# Patient Record
Sex: FEMALE | Race: WHITE | Employment: OTHER | URBAN - NONMETROPOLITAN AREA
[De-identification: names, ages, dates, MRNs, and addresses within clinical notes are randomized per-mention and may not be internally consistent; named-entity substitution may affect disease eponyms.]

---

## 2017-01-25 ENCOUNTER — TELEPHONE (OUTPATIENT)
Dept: PAIN MANAGEMENT | Age: 77
End: 2017-01-25

## 2017-02-06 RX ORDER — OXYCODONE HYDROCHLORIDE 10 MG/1
10 TABLET ORAL 4 TIMES DAILY PRN
Qty: 120 TABLET | Refills: 0 | Status: SHIPPED | OUTPATIENT
Start: 2017-02-10 | End: 2017-03-06 | Stop reason: SDUPTHER

## 2017-02-07 ENCOUNTER — TELEPHONE (OUTPATIENT)
Dept: PAIN MANAGEMENT | Age: 77
End: 2017-02-07

## 2017-03-07 RX ORDER — OXYCODONE HYDROCHLORIDE 10 MG/1
10 TABLET ORAL 4 TIMES DAILY PRN
Qty: 120 TABLET | Refills: 0 | Status: SHIPPED | OUTPATIENT
Start: 2017-03-12 | End: 2017-03-20 | Stop reason: SDUPTHER

## 2017-03-20 ENCOUNTER — HOSPITAL ENCOUNTER (OUTPATIENT)
Dept: PAIN MANAGEMENT | Age: 77
Discharge: HOME | End: 2017-03-20
Payer: MEDICARE

## 2017-03-20 VITALS
HEIGHT: 62 IN | TEMPERATURE: 98.8 F | WEIGHT: 220 LBS | OXYGEN SATURATION: 93 % | SYSTOLIC BLOOD PRESSURE: 120 MMHG | BODY MASS INDEX: 40.48 KG/M2 | RESPIRATION RATE: 20 BRPM | HEART RATE: 82 BPM | DIASTOLIC BLOOD PRESSURE: 60 MMHG

## 2017-03-20 DIAGNOSIS — M70.61 TROCHANTERIC BURSITIS OF BOTH HIPS: ICD-10-CM

## 2017-03-20 DIAGNOSIS — M70.62 TROCHANTERIC BURSITIS OF BOTH HIPS: ICD-10-CM

## 2017-03-20 DIAGNOSIS — M48.061 SPINAL STENOSIS AT L4-L5 LEVEL: Chronic | ICD-10-CM

## 2017-03-20 DIAGNOSIS — M47.816 LUMBAR FACET ARTHROPATHY: ICD-10-CM

## 2017-03-20 DIAGNOSIS — M51.36 LUMBAR DEGENERATIVE DISC DISEASE: ICD-10-CM

## 2017-03-20 PROCEDURE — 62323 NJX INTERLAMINAR LMBR/SAC: CPT

## 2017-03-20 PROCEDURE — 99152 MOD SED SAME PHYS/QHP 5/>YRS: CPT

## 2017-03-20 PROCEDURE — 6360000002 HC RX W HCPCS

## 2017-03-20 PROCEDURE — 2580000003 HC RX 258

## 2017-03-20 PROCEDURE — 2500000003 HC RX 250 WO HCPCS

## 2017-03-20 PROCEDURE — 3209999900 FLUORO FOR SURGICAL PROCEDURES

## 2017-03-20 RX ORDER — SUMATRIPTAN 50 MG/1
50 TABLET, FILM COATED ORAL
Qty: 60 TABLET | Refills: 2 | Status: SHIPPED | OUTPATIENT
Start: 2017-03-20 | End: 2017-06-20 | Stop reason: SDUPTHER

## 2017-03-20 RX ORDER — DOXEPIN HYDROCHLORIDE 10 MG/1
10 CAPSULE ORAL 2 TIMES DAILY
Qty: 60 CAPSULE | Refills: 0 | Status: SHIPPED | OUTPATIENT
Start: 2017-03-20 | End: 2017-03-20 | Stop reason: SDUPTHER

## 2017-03-20 RX ORDER — LIDOCAINE HYDROCHLORIDE 10 MG/ML
INJECTION, SOLUTION EPIDURAL; INFILTRATION; INTRACAUDAL; PERINEURAL
Status: COMPLETED | OUTPATIENT
Start: 2017-03-20 | End: 2017-03-20

## 2017-03-20 RX ORDER — DOXEPIN HYDROCHLORIDE 10 MG/1
10 CAPSULE ORAL 2 TIMES DAILY
Qty: 60 CAPSULE | Refills: 2 | Status: SHIPPED | OUTPATIENT
Start: 2017-03-20 | End: 2017-06-13

## 2017-03-20 RX ORDER — 0.9 % SODIUM CHLORIDE 0.9 %
VIAL (ML) INJECTION
Status: COMPLETED | OUTPATIENT
Start: 2017-03-20 | End: 2017-03-20

## 2017-03-20 RX ORDER — BUTALBITAL, ASPIRIN, AND CAFFEINE 325; 50; 40 MG/1; MG/1; MG/1
1 CAPSULE ORAL EVERY 4 HOURS PRN
Qty: 40 CAPSULE | Refills: 2 | Status: SHIPPED | OUTPATIENT
Start: 2017-03-20 | End: 2017-06-20 | Stop reason: SDUPTHER

## 2017-03-20 RX ORDER — BUPIVACAINE HYDROCHLORIDE 2.5 MG/ML
INJECTION, SOLUTION EPIDURAL; INFILTRATION; INTRACAUDAL
Status: COMPLETED | OUTPATIENT
Start: 2017-03-20 | End: 2017-03-20

## 2017-03-20 RX ORDER — OXYCODONE HYDROCHLORIDE 10 MG/1
10 TABLET ORAL 4 TIMES DAILY PRN
Qty: 120 TABLET | Refills: 0 | Status: SHIPPED | OUTPATIENT
Start: 2017-04-11 | End: 2017-05-08 | Stop reason: SDUPTHER

## 2017-03-20 RX ORDER — METHYLPREDNISOLONE ACETATE 80 MG/ML
INJECTION, SUSPENSION INTRA-ARTICULAR; INTRALESIONAL; INTRAMUSCULAR; SOFT TISSUE
Status: COMPLETED | OUTPATIENT
Start: 2017-03-20 | End: 2017-03-20

## 2017-03-20 RX ORDER — MIDAZOLAM HYDROCHLORIDE 1 MG/ML
INJECTION INTRAMUSCULAR; INTRAVENOUS
Status: COMPLETED | OUTPATIENT
Start: 2017-03-20 | End: 2017-03-20

## 2017-03-20 RX ADMIN — BUPIVACAINE HYDROCHLORIDE 2.5 ML: 2.5 INJECTION, SOLUTION EPIDURAL; INFILTRATION; INTRACAUDAL at 12:17

## 2017-03-20 RX ADMIN — Medication 1.5 ML: at 12:17

## 2017-03-20 RX ADMIN — METHYLPREDNISOLONE ACETATE 80 MG: 80 INJECTION, SUSPENSION INTRA-ARTICULAR; INTRALESIONAL; INTRAMUSCULAR; SOFT TISSUE at 12:17

## 2017-03-20 RX ADMIN — LIDOCAINE HYDROCHLORIDE 3 ML: 10 INJECTION, SOLUTION EPIDURAL; INFILTRATION; INTRACAUDAL; PERINEURAL at 12:15

## 2017-03-20 RX ADMIN — MIDAZOLAM HYDROCHLORIDE 2 MG: 1 INJECTION INTRAMUSCULAR; INTRAVENOUS at 12:15

## 2017-03-20 ASSESSMENT — PAIN DESCRIPTION - ORIENTATION: ORIENTATION: RIGHT;LEFT;LOWER

## 2017-03-20 ASSESSMENT — PAIN DESCRIPTION - ONSET: ONSET: ON-GOING

## 2017-03-20 ASSESSMENT — PAIN DESCRIPTION - LOCATION: LOCATION: BACK;LEG

## 2017-03-20 ASSESSMENT — PAIN DESCRIPTION - FREQUENCY: FREQUENCY: CONTINUOUS

## 2017-03-20 ASSESSMENT — PAIN DESCRIPTION - PROGRESSION: CLINICAL_PROGRESSION: NOT CHANGED

## 2017-03-20 ASSESSMENT — PAIN DESCRIPTION - PAIN TYPE: TYPE: CHRONIC PAIN

## 2017-03-20 ASSESSMENT — PAIN SCALES - GENERAL: PAINLEVEL_OUTOF10: 9

## 2017-03-30 ENCOUNTER — TRANSCRIBE ORDERS (OUTPATIENT)
Dept: ADMINISTRATIVE | Facility: HOSPITAL | Age: 77
End: 2017-03-30

## 2017-03-30 DIAGNOSIS — Z12.31 ENCOUNTER FOR SCREENING MAMMOGRAM FOR MALIGNANT NEOPLASM OF BREAST: Primary | ICD-10-CM

## 2017-03-31 ENCOUNTER — TRANSCRIBE ORDERS (OUTPATIENT)
Dept: ADMINISTRATIVE | Facility: HOSPITAL | Age: 77
End: 2017-03-31

## 2017-03-31 DIAGNOSIS — I65.29 CAROTID ARTERY PLAQUE, UNSPECIFIED LATERALITY: Primary | ICD-10-CM

## 2017-04-04 ENCOUNTER — TRANSCRIBE ORDERS (OUTPATIENT)
Dept: ADMINISTRATIVE | Facility: HOSPITAL | Age: 77
End: 2017-04-04

## 2017-04-04 DIAGNOSIS — R23.2 FLUSH: Primary | ICD-10-CM

## 2017-04-17 ENCOUNTER — HOSPITAL ENCOUNTER (OUTPATIENT)
Dept: MAMMOGRAPHY | Facility: HOSPITAL | Age: 77
Discharge: HOME | End: 2017-04-17
Attending: INTERNAL MEDICINE | Admitting: INTERNAL MEDICINE

## 2017-04-17 ENCOUNTER — HOSPITAL ENCOUNTER (OUTPATIENT)
Dept: ULTRASOUND IMAGING | Facility: HOSPITAL | Age: 77
Discharge: HOME | End: 2017-04-17
Attending: INTERNAL MEDICINE

## 2017-04-17 DIAGNOSIS — Z12.31 ENCOUNTER FOR SCREENING MAMMOGRAM FOR MALIGNANT NEOPLASM OF BREAST: ICD-10-CM

## 2017-04-17 DIAGNOSIS — I65.29 CAROTID ARTERY PLAQUE, UNSPECIFIED LATERALITY: ICD-10-CM

## 2017-04-17 PROCEDURE — G0202 SCR MAMMO BI INCL CAD: HCPCS

## 2017-04-17 PROCEDURE — 93880 EXTRACRANIAL BILAT STUDY: CPT

## 2017-04-17 PROCEDURE — 77063 BREAST TOMOSYNTHESIS BI: CPT

## 2017-04-17 PROCEDURE — 93880 EXTRACRANIAL BILAT STUDY: CPT | Performed by: SURGERY

## 2017-04-18 ENCOUNTER — TRANSCRIBE ORDERS (OUTPATIENT)
Dept: ADMINISTRATIVE | Facility: HOSPITAL | Age: 77
End: 2017-04-18

## 2017-04-18 DIAGNOSIS — R92.1 BREAST CALCIFICATION, RIGHT: Primary | ICD-10-CM

## 2017-04-24 ENCOUNTER — TRANSCRIBE ORDERS (OUTPATIENT)
Dept: ADMINISTRATIVE | Facility: HOSPITAL | Age: 77
End: 2017-04-24

## 2017-04-24 ENCOUNTER — DOCUMENTATION (OUTPATIENT)
Dept: ULTRASOUND IMAGING | Facility: HOSPITAL | Age: 77
End: 2017-04-24

## 2017-04-24 ENCOUNTER — HOSPITAL ENCOUNTER (OUTPATIENT)
Dept: MAMMOGRAPHY | Facility: HOSPITAL | Age: 77
Discharge: HOME | End: 2017-04-24
Attending: INTERNAL MEDICINE | Admitting: INTERNAL MEDICINE

## 2017-04-24 ENCOUNTER — DOCUMENTATION (OUTPATIENT)
Dept: MAMMOGRAPHY | Facility: HOSPITAL | Age: 77
End: 2017-04-24

## 2017-04-24 DIAGNOSIS — R92.1 BREAST CALCIFICATIONS: Primary | ICD-10-CM

## 2017-04-24 DIAGNOSIS — R92.1 BREAST CALCIFICATION, RIGHT: ICD-10-CM

## 2017-04-24 PROCEDURE — G0206 DX MAMMO INCL CAD UNI: HCPCS

## 2017-04-24 NOTE — PROGRESS NOTES
Spoke with Patel with [facility].  No preauth needed for stereotactic biopsy.  Reference # is [id number].  Varinder with Dr. Zaire Uriarte notified of date, time and preauth information.

## 2017-04-24 NOTE — PROGRESS NOTES
Call received to see patient with right breast calcifications and recommendation per Dr. Pak for a right breast stereotactic biopsy.  Call placed to Dr. Uriarte.  Spoke with Jessica.  Approved to set up biopsy.      Spoke with patient.  Educational material given.  All questions answered for pre, during and post procedure.

## 2017-05-02 ENCOUNTER — TRANSCRIBE ORDERS (OUTPATIENT)
Dept: ADMINISTRATIVE | Facility: HOSPITAL | Age: 77
End: 2017-05-02

## 2017-05-02 ENCOUNTER — LAB (OUTPATIENT)
Dept: LAB | Facility: HOSPITAL | Age: 77
End: 2017-05-02

## 2017-05-02 ENCOUNTER — HOSPITAL ENCOUNTER (OUTPATIENT)
Dept: MAMMOGRAPHY | Facility: HOSPITAL | Age: 77
Discharge: HOME | End: 2017-05-02
Attending: INTERNAL MEDICINE | Admitting: INTERNAL MEDICINE

## 2017-05-02 ENCOUNTER — HOSPITAL ENCOUNTER (OUTPATIENT)
Dept: MAMMOGRAPHY | Facility: HOSPITAL | Age: 77
Discharge: HOME | End: 2017-05-02
Attending: INTERNAL MEDICINE

## 2017-05-02 DIAGNOSIS — R92.8 MAMMOGRAM ABNORMAL: Primary | ICD-10-CM

## 2017-05-02 DIAGNOSIS — R92.1 BREAST CALCIFICATIONS: ICD-10-CM

## 2017-05-02 LAB
INR PPP: 1 (ref 0.91–1.09)
PROTHROMBIN TIME: 12.3 SECONDS (ref 10–13.8)

## 2017-05-02 PROCEDURE — 85610 PROTHROMBIN TIME: CPT | Performed by: INTERNAL MEDICINE

## 2017-05-02 PROCEDURE — 88305 TISSUE EXAM BY PATHOLOGIST: CPT | Performed by: INTERNAL MEDICINE

## 2017-05-02 RX ORDER — LIDOCAINE HYDROCHLORIDE 10 MG/ML
10 INJECTION, SOLUTION INFILTRATION; PERINEURAL ONCE
Status: DISCONTINUED | OUTPATIENT
Start: 2017-05-02 | End: 2017-09-06

## 2017-05-02 RX ORDER — LIDOCAINE HYDROCHLORIDE AND EPINEPHRINE 10; 10 MG/ML; UG/ML
10 INJECTION, SOLUTION INFILTRATION; PERINEURAL ONCE
Status: DISCONTINUED | OUTPATIENT
Start: 2017-05-02 | End: 2017-09-06

## 2017-05-03 ENCOUNTER — DOCUMENTATION (OUTPATIENT)
Dept: ULTRASOUND IMAGING | Facility: HOSPITAL | Age: 77
End: 2017-05-03

## 2017-05-03 LAB
CYTO UR: NORMAL
LAB AP CASE REPORT: NORMAL
LAB AP CLINICAL INFORMATION: NORMAL
Lab: NORMAL
PATH REPORT.FINAL DX SPEC: NORMAL
PATH REPORT.GROSS SPEC: NORMAL

## 2017-05-09 RX ORDER — OXYCODONE HYDROCHLORIDE 10 MG/1
10 TABLET ORAL 4 TIMES DAILY PRN
Qty: 120 TABLET | Refills: 0 | Status: SHIPPED | OUTPATIENT
Start: 2017-05-11 | End: 2017-06-06 | Stop reason: SDUPTHER

## 2017-05-30 LAB — TSH SERPL DL<=0.05 MIU/L-ACNC: 4.68 UIU/ML (ref 0.27–4.2)

## 2017-06-07 RX ORDER — OXYCODONE HYDROCHLORIDE 10 MG/1
10 TABLET ORAL 4 TIMES DAILY PRN
Qty: 120 TABLET | Refills: 0 | Status: SHIPPED | OUTPATIENT
Start: 2017-06-10 | End: 2017-06-20 | Stop reason: SDUPTHER

## 2017-06-13 ENCOUNTER — HOSPITAL ENCOUNTER (OUTPATIENT)
Dept: PAIN MANAGEMENT | Age: 77
Discharge: HOME | End: 2017-06-13
Payer: MEDICARE

## 2017-06-13 VITALS
SYSTOLIC BLOOD PRESSURE: 146 MMHG | WEIGHT: 220 LBS | BODY MASS INDEX: 40.48 KG/M2 | HEART RATE: 86 BPM | OXYGEN SATURATION: 98 % | RESPIRATION RATE: 18 BRPM | TEMPERATURE: 96.9 F | DIASTOLIC BLOOD PRESSURE: 97 MMHG | HEIGHT: 62 IN

## 2017-06-13 DIAGNOSIS — M48.061 SPINAL STENOSIS AT L4-L5 LEVEL: ICD-10-CM

## 2017-06-13 DIAGNOSIS — M47.816 LUMBAR FACET ARTHROPATHY: ICD-10-CM

## 2017-06-13 DIAGNOSIS — M70.62 TROCHANTERIC BURSITIS OF BOTH HIPS: ICD-10-CM

## 2017-06-13 DIAGNOSIS — M70.61 TROCHANTERIC BURSITIS OF BOTH HIPS: ICD-10-CM

## 2017-06-13 PROCEDURE — 99213 OFFICE O/P EST LOW 20 MIN: CPT

## 2017-06-13 PROCEDURE — G0463 HOSPITAL OUTPT CLINIC VISIT: HCPCS

## 2017-06-13 RX ORDER — LORAZEPAM 1 MG/1
1 TABLET ORAL DAILY PRN
COMMUNITY
End: 2017-09-05

## 2017-06-13 RX ORDER — LEVOTHYROXINE SODIUM 0.05 MG/1
1 TABLET ORAL
COMMUNITY
Start: 2017-06-10 | End: 2017-12-13 | Stop reason: SDUPTHER

## 2017-06-13 ASSESSMENT — PAIN SCALES - GENERAL: PAINLEVEL_OUTOF10: 10

## 2017-06-13 ASSESSMENT — PAIN DESCRIPTION - ORIENTATION: ORIENTATION: LOWER

## 2017-06-13 ASSESSMENT — PAIN DESCRIPTION - FREQUENCY: FREQUENCY: CONTINUOUS

## 2017-06-13 ASSESSMENT — PAIN DESCRIPTION - LOCATION: LOCATION: BACK

## 2017-06-13 ASSESSMENT — PAIN DESCRIPTION - ONSET: ONSET: ON-GOING

## 2017-06-13 ASSESSMENT — PAIN DESCRIPTION - PROGRESSION: CLINICAL_PROGRESSION: NOT CHANGED

## 2017-06-13 ASSESSMENT — PAIN DESCRIPTION - PAIN TYPE: TYPE: CHRONIC PAIN

## 2017-06-13 ASSESSMENT — ACTIVITIES OF DAILY LIVING (ADL): EFFECT OF PAIN ON DAILY ACTIVITIES: LIMITS ACTIVITY

## 2017-06-13 ASSESSMENT — PAIN DESCRIPTION - DIRECTION: RADIATING_TOWARDS: INTO BILATERAL LEGS

## 2017-06-20 ENCOUNTER — HOSPITAL ENCOUNTER (OUTPATIENT)
Dept: PAIN MANAGEMENT | Age: 77
Discharge: HOME | End: 2017-06-20
Payer: MEDICARE

## 2017-06-20 VITALS
WEIGHT: 228 LBS | TEMPERATURE: 96.8 F | RESPIRATION RATE: 18 BRPM | SYSTOLIC BLOOD PRESSURE: 160 MMHG | HEIGHT: 62 IN | BODY MASS INDEX: 41.96 KG/M2 | DIASTOLIC BLOOD PRESSURE: 72 MMHG | HEART RATE: 78 BPM | OXYGEN SATURATION: 95 %

## 2017-06-20 DIAGNOSIS — M47.816 LUMBAR FACET ARTHROPATHY: ICD-10-CM

## 2017-06-20 DIAGNOSIS — M70.62 TROCHANTERIC BURSITIS OF BOTH HIPS: ICD-10-CM

## 2017-06-20 DIAGNOSIS — M51.36 LUMBAR DEGENERATIVE DISC DISEASE: ICD-10-CM

## 2017-06-20 DIAGNOSIS — M17.11 OSTEOARTHRITIS OF RIGHT KNEE, UNSPECIFIED OSTEOARTHRITIS TYPE: ICD-10-CM

## 2017-06-20 DIAGNOSIS — M48.061 SPINAL STENOSIS AT L4-L5 LEVEL: Primary | ICD-10-CM

## 2017-06-20 DIAGNOSIS — M70.61 TROCHANTERIC BURSITIS OF BOTH HIPS: ICD-10-CM

## 2017-06-20 PROCEDURE — 2500000003 HC RX 250 WO HCPCS

## 2017-06-20 PROCEDURE — 99152 MOD SED SAME PHYS/QHP 5/>YRS: CPT

## 2017-06-20 PROCEDURE — 2580000003 HC RX 258

## 2017-06-20 PROCEDURE — 6360000002 HC RX W HCPCS

## 2017-06-20 PROCEDURE — 62323 NJX INTERLAMINAR LMBR/SAC: CPT

## 2017-06-20 PROCEDURE — 3209999900 FLUORO FOR SURGICAL PROCEDURES

## 2017-06-20 RX ORDER — BUTALBITAL, ASPIRIN, AND CAFFEINE 325; 50; 40 MG/1; MG/1; MG/1
1 CAPSULE ORAL EVERY 4 HOURS PRN
Qty: 40 CAPSULE | Refills: 2 | Status: SHIPPED | OUTPATIENT
Start: 2017-06-20 | End: 2017-09-07 | Stop reason: SDUPTHER

## 2017-06-20 RX ORDER — OXYCODONE HYDROCHLORIDE 10 MG/1
10 TABLET ORAL 4 TIMES DAILY PRN
Qty: 120 TABLET | Refills: 0 | Status: SHIPPED | OUTPATIENT
Start: 2017-07-10 | End: 2017-08-07 | Stop reason: SDUPTHER

## 2017-06-20 RX ORDER — SUMATRIPTAN 50 MG/1
50 TABLET, FILM COATED ORAL
Qty: 60 TABLET | Refills: 2 | Status: SHIPPED | OUTPATIENT
Start: 2017-06-20 | End: 2017-12-12 | Stop reason: SDUPTHER

## 2017-06-20 RX ORDER — MIDAZOLAM HYDROCHLORIDE 1 MG/ML
INJECTION INTRAMUSCULAR; INTRAVENOUS
Status: COMPLETED | OUTPATIENT
Start: 2017-06-20 | End: 2017-06-20

## 2017-06-20 RX ORDER — LIDOCAINE HYDROCHLORIDE 10 MG/ML
INJECTION, SOLUTION EPIDURAL; INFILTRATION; INTRACAUDAL; PERINEURAL
Status: COMPLETED | OUTPATIENT
Start: 2017-06-20 | End: 2017-06-20

## 2017-06-20 RX ORDER — BUPIVACAINE HYDROCHLORIDE 2.5 MG/ML
INJECTION, SOLUTION EPIDURAL; INFILTRATION; INTRACAUDAL
Status: COMPLETED | OUTPATIENT
Start: 2017-06-20 | End: 2017-06-20

## 2017-06-20 RX ORDER — METHYLPREDNISOLONE ACETATE 80 MG/ML
INJECTION, SUSPENSION INTRA-ARTICULAR; INTRALESIONAL; INTRAMUSCULAR; SOFT TISSUE
Status: COMPLETED | OUTPATIENT
Start: 2017-06-20 | End: 2017-06-20

## 2017-06-20 RX ORDER — 0.9 % SODIUM CHLORIDE 0.9 %
VIAL (ML) INJECTION
Status: COMPLETED | OUTPATIENT
Start: 2017-06-20 | End: 2017-06-20

## 2017-06-20 RX ADMIN — MIDAZOLAM HYDROCHLORIDE 1 MG: 1 INJECTION INTRAMUSCULAR; INTRAVENOUS at 14:10

## 2017-06-20 RX ADMIN — BUPIVACAINE HYDROCHLORIDE 2.5 ML: 2.5 INJECTION, SOLUTION EPIDURAL; INFILTRATION; INTRACAUDAL at 14:14

## 2017-06-20 RX ADMIN — LIDOCAINE HYDROCHLORIDE 3 ML: 10 INJECTION, SOLUTION EPIDURAL; INFILTRATION; INTRACAUDAL; PERINEURAL at 14:12

## 2017-06-20 RX ADMIN — METHYLPREDNISOLONE ACETATE 80 MG: 80 INJECTION, SUSPENSION INTRA-ARTICULAR; INTRALESIONAL; INTRAMUSCULAR; SOFT TISSUE at 14:15

## 2017-06-20 RX ADMIN — Medication 1.5 ML: at 14:15

## 2017-06-20 RX ADMIN — MIDAZOLAM HYDROCHLORIDE 1 MG: 1 INJECTION INTRAMUSCULAR; INTRAVENOUS at 14:12

## 2017-06-20 ASSESSMENT — PAIN DESCRIPTION - LOCATION: LOCATION: BACK;LEG

## 2017-06-20 ASSESSMENT — PAIN DESCRIPTION - PROGRESSION: CLINICAL_PROGRESSION: NOT CHANGED

## 2017-06-20 ASSESSMENT — PAIN DESCRIPTION - PAIN TYPE: TYPE: CHRONIC PAIN

## 2017-06-20 ASSESSMENT — PAIN SCALES - GENERAL: PAINLEVEL_OUTOF10: 10

## 2017-06-20 ASSESSMENT — PAIN DESCRIPTION - FREQUENCY: FREQUENCY: CONTINUOUS

## 2017-06-20 ASSESSMENT — PAIN DESCRIPTION - ONSET: ONSET: ON-GOING

## 2017-06-20 ASSESSMENT — PAIN DESCRIPTION - DESCRIPTORS: DESCRIPTORS: ACHING;BURNING;CONSTANT;RADIATING;SHARP

## 2017-06-20 ASSESSMENT — PAIN DESCRIPTION - ORIENTATION: ORIENTATION: RIGHT;LEFT;LOWER

## 2017-06-21 RX ORDER — CARVEDILOL 6.25 MG/1
6.25 TABLET ORAL 2 TIMES DAILY
COMMUNITY
Start: 2017-03-23 | End: 2017-06-21 | Stop reason: SDUPTHER

## 2017-06-21 RX ORDER — CARVEDILOL 6.25 MG/1
TABLET ORAL
Qty: 180 TABLET | Refills: 3 | Status: SHIPPED | OUTPATIENT
Start: 2017-06-21 | End: 2018-06-15 | Stop reason: SDUPTHER

## 2017-07-20 ENCOUNTER — TELEPHONE (OUTPATIENT)
Dept: PAIN MANAGEMENT | Age: 77
End: 2017-07-20

## 2017-07-20 DIAGNOSIS — M48.061 LUMBAR SPINAL STENOSIS: Primary | ICD-10-CM

## 2017-07-21 ENCOUNTER — TELEPHONE (OUTPATIENT)
Dept: PAIN MANAGEMENT | Age: 77
End: 2017-07-21

## 2017-07-24 LAB
ALBUMIN SERPL-MCNC: 3.7 G/DL (ref 3.5–5.2)
ALP BLD-CCNC: 107 U/L (ref 35–104)
ALT SERPL-CCNC: 7 U/L (ref 5–33)
ANION GAP SERPL CALCULATED.3IONS-SCNC: 15 MMOL/L (ref 7–19)
AST SERPL-CCNC: 17 U/L (ref 5–32)
BASOPHILS ABSOLUTE: 0 K/UL (ref 0–0.2)
BASOPHILS RELATIVE PERCENT: 0.5 % (ref 0–1)
BILIRUB SERPL-MCNC: 0.3 MG/DL (ref 0.2–1.2)
BUN BLDV-MCNC: 22 MG/DL (ref 8–23)
CALCIUM SERPL-MCNC: 9.8 MG/DL (ref 8.8–10.2)
CHLORIDE BLD-SCNC: 100 MMOL/L (ref 98–111)
CO2: 26 MMOL/L (ref 22–29)
CREAT SERPL-MCNC: 1.1 MG/DL (ref 0.5–0.9)
EOSINOPHILS ABSOLUTE: 0.3 K/UL (ref 0–0.6)
EOSINOPHILS RELATIVE PERCENT: 4.8 % (ref 0–5)
GFR NON-AFRICAN AMERICAN: 48
GLUCOSE BLD-MCNC: 107 MG/DL (ref 74–109)
HCT VFR BLD CALC: 34.9 % (ref 37–47)
HEMOGLOBIN: 11.5 G/DL (ref 12–16)
LDL CHOLESTEROL DIRECT: 103 MG/DL
LYMPHOCYTES ABSOLUTE: 2.6 K/UL (ref 1.1–4.5)
LYMPHOCYTES RELATIVE PERCENT: 39.8 % (ref 20–40)
MCH RBC QN AUTO: 30.1 PG (ref 27–31)
MCHC RBC AUTO-ENTMCNC: 33 G/DL (ref 33–37)
MCV RBC AUTO: 91.4 FL (ref 81–99)
MONOCYTES ABSOLUTE: 0.6 K/UL (ref 0–0.9)
MONOCYTES RELATIVE PERCENT: 9.3 % (ref 0–10)
NEUTROPHILS ABSOLUTE: 2.9 K/UL (ref 1.5–7.5)
NEUTROPHILS RELATIVE PERCENT: 45.3 % (ref 50–65)
PDW BLD-RTO: 12.9 % (ref 11.5–14.5)
PLATELET # BLD: 195 K/UL (ref 130–400)
PMV BLD AUTO: 11.3 FL (ref 9.4–12.3)
POTASSIUM SERPL-SCNC: 4.4 MMOL/L (ref 3.5–5)
RBC # BLD: 3.82 M/UL (ref 4.2–5.4)
SODIUM BLD-SCNC: 141 MMOL/L (ref 136–145)
TOTAL PROTEIN: 6.8 G/DL (ref 6.6–8.7)
TSH SERPL DL<=0.05 MIU/L-ACNC: 4.16 UIU/ML (ref 0.27–4.2)
VITAMIN D 25-HYDROXY: 30 NG/ML
WBC # BLD: 6.5 K/UL (ref 4.8–10.8)

## 2017-08-08 RX ORDER — OXYCODONE HYDROCHLORIDE 10 MG/1
10 TABLET ORAL 4 TIMES DAILY PRN
Qty: 120 TABLET | Refills: 0 | Status: SHIPPED | OUTPATIENT
Start: 2017-08-09 | End: 2017-09-13 | Stop reason: SDUPTHER

## 2017-08-22 ENCOUNTER — OFFICE VISIT (OUTPATIENT)
Dept: GASTROENTEROLOGY | Facility: CLINIC | Age: 77
End: 2017-08-22

## 2017-08-22 VITALS
TEMPERATURE: 97.5 F | OXYGEN SATURATION: 97 % | BODY MASS INDEX: 41.04 KG/M2 | WEIGHT: 223 LBS | DIASTOLIC BLOOD PRESSURE: 80 MMHG | SYSTOLIC BLOOD PRESSURE: 128 MMHG | HEART RATE: 77 BPM | HEIGHT: 62 IN

## 2017-08-22 DIAGNOSIS — R13.10 DYSPHAGIA, UNSPECIFIED TYPE: ICD-10-CM

## 2017-08-22 DIAGNOSIS — K21.9 GASTROESOPHAGEAL REFLUX DISEASE, ESOPHAGITIS PRESENCE NOT SPECIFIED: Primary | ICD-10-CM

## 2017-08-22 PROCEDURE — 99203 OFFICE O/P NEW LOW 30 MIN: CPT | Performed by: NURSE PRACTITIONER

## 2017-08-22 RX ORDER — BUTALBITAL, ASPIRIN, AND CAFFEINE 325; 50; 40 MG/1; MG/1; MG/1
1 CAPSULE ORAL EVERY 4 HOURS PRN
COMMUNITY
Start: 2017-06-20 | End: 2019-04-11

## 2017-08-22 RX ORDER — LEVOTHYROXINE SODIUM 0.05 MG/1
TABLET ORAL
COMMUNITY
Start: 2017-06-10 | End: 2019-04-11

## 2017-08-22 RX ORDER — IRBESARTAN 150 MG/1
150 TABLET ORAL DAILY
COMMUNITY
End: 2019-05-16

## 2017-08-22 RX ORDER — ERGOCALCIFEROL 1.25 MG/1
50000 CAPSULE ORAL WEEKLY
COMMUNITY
End: 2019-04-11

## 2017-08-22 RX ORDER — CYCLOBENZAPRINE HCL 10 MG
10 TABLET ORAL 3 TIMES DAILY PRN
COMMUNITY
Start: 2016-09-19 | End: 2017-10-19

## 2017-08-22 RX ORDER — CARVEDILOL 6.25 MG/1
TABLET ORAL
COMMUNITY
Start: 2017-06-21 | End: 2019-04-11

## 2017-08-22 NOTE — PROGRESS NOTES
Chief Complaint   Patient presents with   • Endoscopy     9-23-10 had endo short segment hearn;s       Subjective     HPI     Hx of GERD related symptoms for over 7 yrs.  Increase heartburn and burning in chest that has been worsening.  Associated dysphagia noted with pills and liquids.  Denies difficulty swallowing solid foods.  Symptoms felt in upper esophagus.  Cinnamon, spicy foods cause worsening of symptoms.  Occasional regurgitation. No abdominal pain, no BRBPR, no melena stools.   Currently maintained on Lansoprazole daily (review of records indicate she has taken this medication x 7 yr), takes in the am on empty stomach.  Tries to avoid eating late.  Occasionally takes extra Lansoprazole at hs or OTC acid reducer.    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, histologically bx were negative for intestinal metaplasia (report reviewed with pt)    Past Medical History:   Diagnosis Date   • Depression    • Hyperlipidemia    • Hypertension    • Peptic ulcer        Past Surgical History:   Procedure Laterality Date   • BREAST CYST EXCISION     • CARDIAC SURGERY     • CYSTECTOMY     • ENDOSCOPY  09/23/2010    Short segment of Hearn's,Moderate chroninc esophagogastritis and negative H.pylori       Outpatient Prescriptions Marked as Taking for the 8/22/17 encounter (Office Visit) with MORGAN Calderon   Medication Sig Dispense Refill   • APAP-isometheptene-dichloral -325 MG per capsule Take 1 capsule by mouth 4 (Four) Times a Day As Needed for Headache.     • aspirin 81 MG tablet Take  by mouth.     • atorvastatin (LIPITOR) 40 MG tablet Take 40 mg by mouth Daily.     • butalbital-aspirin-caffeine (FIORINAL) -40 MG capsule Take  by mouth.     • carbidopa-levodopa (SINEMET)  MG per tablet Take  by mouth.     • carvedilol (COREG) 6.25 MG tablet TAKE 1 TABLET TWICE A DAY     • cyclobenzaprine (FLEXERIL) 10 MG tablet Take  by mouth.     • irbesartan (AVAPRO) 300 MG  tablet Take  by mouth.     • lansoprazole (PREVACID) 30 MG capsule Take 30 mg by mouth Daily.     • levothyroxine (SYNTHROID, LEVOTHROID) 50 MCG tablet 1 tablet daily     • lisinopril (PRINIVIL,ZESTRIL) 20 MG tablet Take 20 mg by mouth Daily.     • oxyCODONE (OXY-IR) 5 MG capsule Take 5 mg by mouth Every 4 (Four) Hours As Needed for Moderate Pain .     • oxyCODONE (ROXICODONE) 10 MG tablet Take  by mouth.     • SUMAtriptan (IMITREX) 25 MG tablet Take 25 mg by mouth 1 (One) Time As Needed for Migraine.     • venlafaxine (EFFEXOR) 75 MG tablet Take 75 mg by mouth 2 (Two) Times a Day.     • vitamin D (ERGOCALCIFEROL) 94091 units capsule capsule Take 50,000 Units by mouth 1 (One) Time Per Week.       Current Facility-Administered Medications for the 8/22/17 encounter (Office Visit) with MORGAN Calderon   Medication Dose Route Frequency Provider Last Rate Last Dose   • lidocaine (XYLOCAINE) 1 % injection 10 mL  10 mL Subcutaneous Once Dariela Pool MD       • lidocaine-EPINEPHrine (XYLOCAINE W/EPI) 1 %-1:293190 injection 10 mL  10 mL Injection Once Dariela Pool MD       • sodium bicarbonate injection 2.5 mEq  5 mL Injection Once Dariela Pool MD           No Known Allergies    Social History     Social History   • Marital status:      Spouse name: N/A   • Number of children: N/A   • Years of education: N/A     Occupational History   • Not on file.     Social History Main Topics   • Smoking status: Never Smoker   • Smokeless tobacco: Never Used   • Alcohol use No   • Drug use: No   • Sexual activity: Defer     Other Topics Concern   • Not on file     Social History Narrative       Family History   Problem Relation Age of Onset   • Colon cancer Neg Hx    • Esophageal cancer Neg Hx        Review of Systems   Constitutional: Negative for fatigue, fever and unexpected weight change.   HENT: Negative for hearing loss, sore throat and voice change.    Eyes: Negative for visual disturbance.   Respiratory:  "Negative for cough, shortness of breath and wheezing.    Cardiovascular: Negative for chest pain and palpitations.   Gastrointestinal: Negative for abdominal pain, blood in stool and vomiting.   Endocrine: Negative for polydipsia and polyuria.   Genitourinary: Negative for difficulty urinating, dysuria, hematuria and urgency.   Musculoskeletal: Negative for joint swelling and myalgias.   Skin: Negative for color change, rash and wound.   Neurological: Negative for dizziness, tremors, seizures and syncope.   Hematological: Does not bruise/bleed easily.   Psychiatric/Behavioral: Negative for agitation and confusion. The patient is not nervous/anxious.        Objective     Vitals:    08/22/17 1002   BP: 128/80   Pulse: 77   Temp: 97.5 °F (36.4 °C)   SpO2: 97%   Weight: 223 lb (101 kg)   Height: 62\" (157.5 cm)     Body mass index is 40.79 kg/(m^2).    Physical Exam   Constitutional: She is oriented to person, place, and time. She appears well-developed and well-nourished.   HENT:   Head: Normocephalic and atraumatic.   Eyes: Conjunctivae are normal. Pupils are equal, round, and reactive to light. No scleral icterus.   Neck: No JVD present. No thyroid mass and no thyromegaly present.   Cardiovascular: Normal rate, regular rhythm and normal heart sounds.  Exam reveals no gallop and no friction rub.    No murmur heard.  Pulmonary/Chest: Effort normal and breath sounds normal. No accessory muscle usage. No respiratory distress. She has no wheezes. She has no rales.   Abdominal: Soft. Bowel sounds are normal. She exhibits no distension, no ascites and no mass. There is no splenomegaly or hepatomegaly. There is no tenderness. There is no rebound and no guarding.   Genitourinary:   Genitourinary Comments: Rectal-Did not examine   Musculoskeletal: Normal range of motion. She exhibits no edema.   Neurological: She is alert and oriented to person, place, and time.   Deemed a reliable historian, able to converse without difficulty " "and able to move all extremities without difficulty   Skin: Skin is warm and dry.   Psychiatric: She has a normal mood and affect. Her behavior is normal.       Imaging Results (most recent)     None          Assessment/Plan     Jennifer was seen today for endoscopy.    Diagnoses and all orders for this visit:    Gastroesophageal reflux disease, esophagitis presence not specified  -     Case Request; Standing  -     Implement Anesthesia Orders Day of Procedure; Standing  -     Obtain Informed Consent; Standing  -     Case Request    Dysphagia, unspecified type      ESOPHAGOGASTRODUODENOSCOPY WITH ANESTHESIA (N/A)   Dr Velasco to discuss Colonoscopy after procedure, review of records do not indicate pt has had procedure    Recommended Esophagram prior to EGD, pt refused .  Explained it was to evaluate anatomic normality prior to EGD.  Pt continued to decline procedure stating \"I trust Dr Velasco.\"    Advised pt to stop ASA day prior to procedure and to stop use of NSAIDs, Fish Oil, and MV 5 days prior to procedure.  Tylenol based products are ok to take.  Pt verbalized understanding.    Patient is to continue all blood pressure and cardiac medications prior to procedure.     The risk of the endoscopy were discussed in detail.  We discussed the risk of perforation (one out of 2097-8718, riskier with dilation), bleeding (one out of 500), and the rare risks of infection, adverse reaction to anesthesia, respiratory failure, cardiac failure including MI and adverse reaction to medications, etc.  We discussed consequences that could occur if a risk were to develop such as the need for hospitalization, blood transfusion, surgical intervention, medications, pain and disability and death.  Alternatives include not doing anything, or pursuing an UGI series which only offers a diagnosis with potential less accuracy compared to egd.  The patient verbalizes understanding and agrees to proceed.            There are no Patient " Instructions on file for this visit.

## 2017-08-25 RX ORDER — CYCLOBENZAPRINE HCL 5 MG
TABLET ORAL
Qty: 270 TABLET | Refills: 1 | Status: SHIPPED | OUTPATIENT
Start: 2017-08-25 | End: 2017-09-05

## 2017-08-30 ENCOUNTER — HOSPITAL ENCOUNTER (OUTPATIENT)
Dept: GENERAL RADIOLOGY | Facility: HOSPITAL | Age: 77
Discharge: HOME | End: 2017-08-30
Attending: NEUROLOGICAL SURGERY | Admitting: NEUROLOGICAL SURGERY

## 2017-08-30 ENCOUNTER — OFFICE VISIT (OUTPATIENT)
Dept: NEUROSURGERY | Facility: CLINIC | Age: 77
End: 2017-08-30

## 2017-08-30 VITALS
DIASTOLIC BLOOD PRESSURE: 85 MMHG | BODY MASS INDEX: 41.04 KG/M2 | WEIGHT: 223 LBS | HEIGHT: 62 IN | SYSTOLIC BLOOD PRESSURE: 138 MMHG

## 2017-08-30 DIAGNOSIS — M48.061 SPINAL STENOSIS, LUMBAR REGION, WITHOUT NEUROGENIC CLAUDICATION: Primary | ICD-10-CM

## 2017-08-30 DIAGNOSIS — Z78.9 NON-SMOKER: ICD-10-CM

## 2017-08-30 DIAGNOSIS — E66.3 OVERWEIGHT: ICD-10-CM

## 2017-08-30 PROCEDURE — 99203 OFFICE O/P NEW LOW 30 MIN: CPT | Performed by: NEUROLOGICAL SURGERY

## 2017-08-30 PROCEDURE — 72110 X-RAY EXAM L-2 SPINE 4/>VWS: CPT

## 2017-08-30 RX ORDER — DILTIAZEM HYDROCHLORIDE 240 MG/1
240 CAPSULE, EXTENDED RELEASE ORAL NIGHTLY
COMMUNITY
Start: 2015-07-15 | End: 2019-05-18 | Stop reason: HOSPADM

## 2017-08-30 RX ORDER — LORATADINE 10 MG/1
TABLET ORAL
COMMUNITY
Start: 2016-03-07 | End: 2017-09-06 | Stop reason: SDDI

## 2017-08-30 RX ORDER — LANSOPRAZOLE 30 MG/1
30 CAPSULE, DELAYED RELEASE ORAL DAILY
COMMUNITY
End: 2019-10-01

## 2017-08-30 RX ORDER — ESCITALOPRAM OXALATE 20 MG/1
TABLET ORAL
COMMUNITY
Start: 2013-07-22 | End: 2017-09-06 | Stop reason: SINTOL

## 2017-08-30 RX ORDER — DILTIAZEM HYDROCHLORIDE 240 MG/1
CAPSULE, EXTENDED RELEASE ORAL
COMMUNITY
Start: 2016-03-07 | End: 2017-09-06 | Stop reason: ALTCHOICE

## 2017-08-30 RX ORDER — SUMATRIPTAN 50 MG/1
50 TABLET, FILM COATED ORAL 2 TIMES DAILY PRN
COMMUNITY
Start: 2017-06-30 | End: 2019-07-16 | Stop reason: SDUPTHER

## 2017-08-30 RX ORDER — BUTALBITAL, ACETAMINOPHEN AND CAFFEINE 50; 325; 40 MG/1; MG/1; MG/1
TABLET ORAL
COMMUNITY
Start: 2013-03-07 | End: 2017-09-06 | Stop reason: SINTOL

## 2017-08-30 RX ORDER — ERGOCALCIFEROL 1.25 MG/1
CAPSULE ORAL
COMMUNITY
Start: 2017-01-23 | End: 2017-09-06 | Stop reason: SDUPTHER

## 2017-08-30 NOTE — PROGRESS NOTES
"    Chief complaint   Chief Complaint   Patient presents with   • Back Pain        Subjective     HPI:     This patient is a 76-year-old female with a 14 year history of low back pain and bilateral hip pain.  She has been treated with a pain management physician over the past 13 years.  She states over the past 2-1/2 year she has had progressive leg weakness and now requires a cane to ambulate.  She states this pain started spontaneously.  The pain is constant.  It is associated with pain in all joints in both hips.  Severity level is 7-9 out of 10.  Timing is all the time.  It has been associated with bilateral hip bursitis.  Steroid injections with physical therapy have helped but temporarily.    Review of Systems     A 12 point review of systems is obtained and is negative except for as described in HPI    Past Medical History:   Diagnosis Date   • Depression    • Hyperlipidemia    • Hypertension    • Peptic ulcer      Past Surgical History:   Procedure Laterality Date   • BREAST CYST EXCISION     • CARDIAC SURGERY     • CYSTECTOMY     • ENDOSCOPY  09/23/2010    Short segment of [place]riola's,Moderate chroninc esophagogastritis and negative H.pylori     Family History   Problem Relation Age of Onset   • Colon cancer Neg Hx    • Esophageal cancer Neg Hx      Social History   Substance Use Topics   • Smoking status: Never Smoker   • Smokeless tobacco: Never Used   • Alcohol use No       (Not in a hospital admission)  Allergies:  Codeine; Morphine; Ambien  [zolpidem]; Eszopiclone; Pregabalin; Ropinirole hcl; and Tizanidine    Objective      Vital Signs  /85  Ht 62\" (157.5 cm)  Wt 223 lb (101 kg)  BMI 40.79 kg/m2    Physical Exam     No acute distress  Awake alert oriented ×3  HEENT atraumatic normocephalic  Neck supple  Abdomen soft, nontender  Extremities no clubbing, edema, cyanosis  Neurologic exam  Cranial nerves II through XII intact  Ambulates with a cane  Sensation is intact light touch in upper and " lower extremities  No long track signs  2+ patellar reflex, 1+ Achilles reflex bilaterally  No cerebellar findings on toes  Moves all extremities 5 out of 5 strength    Results Review:     MRI lumbar spine shows L4, 5 moderate to severe stenosis    Flexion-extension plain film lumbar spine show grade 1 spondylolisthesis with movement          Assessment/Plan:     76-year-old female with chronic low back pain and hip pain persistent despite pain management and progressive signs and symptoms of neurogenic claudication over the past 2 years now requires a cane to ambulate.  MRI lumbar spine shows severe L4, 5 lumbar stenosis.  Flexion-extension plain films show a mobile spondylolisthesis L4,5.  I have discussed risks, benefits, alternatives of a lumbar decompression with instrumented fusion with the patient.  I have reviewed imaging findings with the patient and her .  I discussed risks that include but are not limited to leg pain not improving, leg pain worsening, weakness not improving, spinal fluid leak, infection, need for revision surgery, adjacent level disease, death.  They acknowledge and wish to proceed with surgery.  Thank you for this consultation.    I discussed the patients findings and my recommendations with patient    Raoul Lucio MD  08/30/17  9:44 AM

## 2017-09-05 RX ORDER — ERGOCALCIFEROL 1.25 MG/1
50000 CAPSULE ORAL WEEKLY
COMMUNITY
End: 2018-09-17 | Stop reason: SDUPTHER

## 2017-09-05 RX ORDER — LORATADINE 10 MG/1
10 CAPSULE, LIQUID FILLED ORAL DAILY
COMMUNITY
End: 2022-05-03

## 2017-09-05 RX ORDER — ESCITALOPRAM OXALATE 20 MG/1
20 TABLET ORAL DAILY
COMMUNITY
End: 2017-09-13

## 2017-09-06 ENCOUNTER — HOSPITAL ENCOUNTER (OUTPATIENT)
Dept: GENERAL RADIOLOGY | Facility: HOSPITAL | Age: 77
Discharge: HOME | End: 2017-09-06
Admitting: NEUROLOGICAL SURGERY

## 2017-09-06 ENCOUNTER — APPOINTMENT (OUTPATIENT)
Dept: PREADMISSION TESTING | Facility: HOSPITAL | Age: 77
End: 2017-09-06

## 2017-09-06 VITALS
BODY MASS INDEX: 41.62 KG/M2 | WEIGHT: 226.2 LBS | HEART RATE: 96 BPM | SYSTOLIC BLOOD PRESSURE: 128 MMHG | HEIGHT: 62 IN | OXYGEN SATURATION: 98 % | DIASTOLIC BLOOD PRESSURE: 78 MMHG | RESPIRATION RATE: 18 BRPM | TEMPERATURE: 97.9 F

## 2017-09-06 DIAGNOSIS — M48.061 SPINAL STENOSIS, LUMBAR REGION, WITHOUT NEUROGENIC CLAUDICATION: ICD-10-CM

## 2017-09-06 LAB
ANION GAP SERPL CALCULATED.3IONS-SCNC: 9 MMOL/L (ref 4–13)
BACTERIA UR QL AUTO: ABNORMAL /HPF
BILIRUB UR QL STRIP: NEGATIVE
BUN BLD-MCNC: 25 MG/DL (ref 5–21)
BUN/CREAT SERPL: 23.4 (ref 7–25)
CALCIUM SPEC-SCNC: 9.8 MG/DL (ref 8.4–10.4)
CHLORIDE SERPL-SCNC: 105 MMOL/L (ref 98–110)
CLARITY UR: ABNORMAL
CO2 SERPL-SCNC: 27 MMOL/L (ref 24–31)
COLOR UR: ABNORMAL
CREAT BLD-MCNC: 1.07 MG/DL (ref 0.5–1.4)
DEPRECATED RDW RBC AUTO: 39.6 FL (ref 40–54)
ERYTHROCYTE [DISTWIDTH] IN BLOOD BY AUTOMATED COUNT: 13 % (ref 12–15)
GFR SERPL CREATININE-BSD FRML MDRD: 50 ML/MIN/1.73
GLUCOSE BLD-MCNC: 115 MG/DL (ref 70–100)
GLUCOSE UR STRIP-MCNC: NEGATIVE MG/DL
HCT VFR BLD AUTO: 34.8 % (ref 37–47)
HGB BLD-MCNC: 11.8 G/DL (ref 12–16)
HGB UR QL STRIP.AUTO: ABNORMAL
HYALINE CASTS UR QL AUTO: ABNORMAL /LPF
KETONES UR QL STRIP: ABNORMAL
LEUKOCYTE ESTERASE UR QL STRIP.AUTO: ABNORMAL
MCH RBC QN AUTO: 29.7 PG (ref 28–32)
MCHC RBC AUTO-ENTMCNC: 33.9 G/DL (ref 33–36)
MCV RBC AUTO: 87.7 FL (ref 82–98)
NITRITE UR QL STRIP: NEGATIVE
PH UR STRIP.AUTO: 6 [PH] (ref 5–8)
PLATELET # BLD AUTO: 183 10*3/MM3 (ref 130–400)
PMV BLD AUTO: 11.7 FL (ref 6–12)
POTASSIUM BLD-SCNC: 4 MMOL/L (ref 3.5–5.3)
PROT UR QL STRIP: ABNORMAL
RBC # BLD AUTO: 3.97 10*6/MM3 (ref 4.2–5.4)
RBC # UR: ABNORMAL /HPF
REF LAB TEST METHOD: ABNORMAL
SODIUM BLD-SCNC: 141 MMOL/L (ref 135–145)
SP GR UR STRIP: 1.03 (ref 1–1.03)
SQUAMOUS #/AREA URNS HPF: ABNORMAL /HPF
UROBILINOGEN UR QL STRIP: ABNORMAL
WBC NRBC COR # BLD: 6.5 10*3/MM3 (ref 4.8–10.8)
WBC UR QL AUTO: ABNORMAL /HPF

## 2017-09-06 PROCEDURE — 87086 URINE CULTURE/COLONY COUNT: CPT | Performed by: NEUROLOGICAL SURGERY

## 2017-09-06 PROCEDURE — 93005 ELECTROCARDIOGRAM TRACING: CPT

## 2017-09-06 PROCEDURE — 81001 URINALYSIS AUTO W/SCOPE: CPT | Performed by: NEUROLOGICAL SURGERY

## 2017-09-06 PROCEDURE — 71010 HC CHEST PA OR AP: CPT

## 2017-09-06 PROCEDURE — 80048 BASIC METABOLIC PNL TOTAL CA: CPT | Performed by: NEUROLOGICAL SURGERY

## 2017-09-06 PROCEDURE — 93010 ELECTROCARDIOGRAM REPORT: CPT | Performed by: INTERNAL MEDICINE

## 2017-09-06 PROCEDURE — 85027 COMPLETE CBC AUTOMATED: CPT | Performed by: NEUROLOGICAL SURGERY

## 2017-09-06 PROCEDURE — 36415 COLL VENOUS BLD VENIPUNCTURE: CPT

## 2017-09-06 RX ORDER — ASPIRIN 325 MG
650 TABLET, DELAYED RELEASE (ENTERIC COATED) ORAL 3 TIMES DAILY PRN
COMMUNITY
End: 2017-09-25 | Stop reason: HOSPADM

## 2017-09-06 NOTE — DISCHARGE INSTRUCTIONS
DAY OF SURGERY INSTRUCTIONS        YOUR SURGEON: ***DIVINE VERDUGO     PROCEDURE: ***LUJMBAR DECOMPRESSION AT 4 AND 5    DATE OF SURGERY: ***SEPTEMBER 28, 2017    ARRIVAL TIME: AS DIRECTED BY OFFICE    DAY OF SURGERY TAKE ONLY THESE MEDICATIONS: ***AS HIGHLIGHTED ON YOUR AFTER VISIT SUMMARY         BEFORE YOU COME TO THE HOSPITAL  (Pre-op instructions)  • Do not eat, drink, smoke or chew gum after midnight the night before surgery.  This also includes no mints.  • Morning of surgery take only the medicines you have been instructed with a sip of water unless otherwise instructed  by your physician.  • Do not shave, wear makeup or dark nail polish.  • Remove all jewelry including rings.  • Leave anything you consider valuable at home.  • Leave your suitcase in the car until after your surgery.  • Bring the following with you if applicable:  o Picture ID and insurance, Medicare or Medicaid cards  o Co-pay/deductible required by insurance (cash, check, credit card)  o Copy of advance directive, living will or power-of- documents if not brought to PAT  o CPAP or BIPAP mask and tubing  o Relaxation aids (MP3 player, book, magazine)  • On the day of surgery check in at registration located at the main entrance of the hospital.       Outpatient Surgery Guidelines, Adult  Outpatient procedures are those for which the person having the procedure is allowed to go home the same day as the procedure. Various procedures are done on an outpatient basis. You should follow some general guidelines if you will be having an outpatient procedure.  LET YOUR HEALTH CARE PROVIDER KNOW ABOUT:  · Any allergies you have.  · All medicines you are taking, including vitamins, herbs, eye drops, creams, and over-the-counter medicines.  · Previous problems you or members of your family have had with the use of anesthetics.  · Any blood disorders you have.  · Previous surgeries you have had.  · Medical conditions you have.  RISKS AND  COMPLICATIONS  Your health care provider will discuss possible risks and complications with you before surgery. Common risks and complications include:    · Problems due to the use of anesthetics.  · Blood loss and replacement (does not apply to minor surgical procedures).  · Temporary increase in pain due to surgery.  · Uncorrected pain or problems that the surgery was meant to correct.  · Infection.  · New damage.  BEFORE THE PROCEDURE  · Ask your health care provider about changing or stopping your regular medicines. You may need to stop taking certain medicines in the days or weeks before the procedure.  · Stop smoking at least 2 weeks before surgery. This lowers your risk for complications during and after surgery. Ask your health care provider for help with this if needed.  · Eat your usual meals and a light supper the day before surgery. Continue fluid intake. Do not drink alcohol.  · Do not eat or drink after midnight the night before your surgery.   · Arrange for someone to take you home and to stay with you for 24 hours after the procedure. Medicine given for your procedure may affect your ability to drive or to care for yourself.  · Call your health care provider's office if you develop an illness or problem that may prevent you from safely having your procedure.  AFTER THE PROCEDURE  After surgery, you will be taken to a recovery area, where your progress will be monitored. If there are no complications, you will be allowed to go home when you are awake, stable, and taking fluids well. You may have numbness around the surgical site. Healing will take some time. You will have tenderness at the surgical site and may have some swelling and bruising. You may also have some nausea.  HOME CARE INSTRUCTIONS  · Do not drive for 24 hours, or as directed by your health care provider. Do not drive while taking prescription pain medicines.  · Do not drink alcohol for 24 hours.  · Do not make important decisions or  sign legal documents for 24 hours.  · You may resume a normal diet and activities as directed.  · Do not lift anything heavier than 10 pounds (4.5 kg) or play contact sports until your health care provider says it is okay.  · Change your bandages (dressings) as directed.  · Only take over-the-counter or prescription medicines as directed by your health care provider.  · Follow up with your health care provider as directed.  SEEK MEDICAL CARE IF:  · You have increased bleeding (more than a small spot) from the surgical site.  · You have redness, swelling, or increasing pain in the wound.  · You see pus coming from the wound.  · You have a fever.  · You notice a bad smell coming from the wound or dressing.  · You feel lightheaded or faint.  · You develop a rash.  · You have trouble breathing.  · You develop allergies.  MAKE SURE YOU:  · Understand these instructions.  · Will watch your condition.  · Will get help right away if you are not doing well or get worse.     This information is not intended to replace advice given to you by your health care provider. Make sure you discuss any questions you have with your health care provider.     Document Released: 09/12/2002 Document Revised: 05/03/2016 Document Reviewed: 05/22/2014  [employer] Interactive Patient Education ©2016 [employer]       Fall Prevention in Hospitals, Adult  As a hospital patient, your condition and the treatments you receive can increase your risk for falls. Some additional risk factors for falls in a hospital include:  · Being in an unfamiliar environment.  · Being on bed rest.  · Your surgery.  · Taking certain medicines.  · Your tubing requirements, such as intravenous (IV) therapy or catheters.  It is important that you learn how to decrease fall risks while at the hospital. Below are important tips that can help prevent falls.  SAFETY TIPS FOR PREVENTING FALLS  Talk about your risk of falling.  · Ask your health care provider why you are at  risk for falling. Is it your medicine, illness, tubing placement, or something else?  · Make a plan with your health care provider to keep you safe from falls.  · Ask your health care provider or pharmacist about side effects of your medicines. Some medicines can make you dizzy or affect your coordination.  Ask for help.  · Ask for help before getting out of bed. You may need to press your call button.  · Ask for assistance in getting safely to the toilet.  · Ask for a walker or cane to be put at your bedside. Ask that most of the side rails on your bed be placed up before your health care provider leaves the room.  · Ask family or friends to sit with you.  · Ask for things that are out of your reach, such as your glasses, hearing aids, telephone, bedside table, or call button.  Follow these tips to avoid falling:  · Stay lying or seated, rather than standing, while waiting for help.  · Wear rubber-soled slippers or shoes whenever you walk in the hospital.  · Avoid quick, sudden movements.  ¨ Change positions slowly.  ¨ Sit on the side of your bed before standing.  ¨ Stand up slowly and wait before you start to walk.  · Let your health care provider know if there is a spill on the floor.  · Pay careful attention to the medical equipment, electrical cords, and tubes around you.  · When you need help, use your call button by your bed or in the bathroom. Wait for one of your health care providers to help you.  · If you feel dizzy or unsure of your footing, return to bed and wait for assistance.  · Avoid being distracted by the TV, telephone, or another person in your room.  · Do not lean or support yourself on rolling objects, such as IV poles or bedside tables.     This information is not intended to replace advice given to you by your health care provider. Make sure you discuss any questions you have with your health care provider.     Document Released: 12/15/2001 Document Revised: 01/08/2016 Document Reviewed:  08/25/2013  [employer] Interactive Patient Education ©2016 [employer]       Surgical Site Infections FAQs  What is a Surgical Site Infection (SSI)?  A surgical site infection is an infection that occurs after surgery in the part of the body where the surgery took place. Most patients who have surgery do not develop an infection. However, infections develop in about 1 to 3 out of every 100 patients who have surgery.  Some of the common symptoms of a surgical site infection are:  · Redness and pain around the area where you had surgery  · Drainage of cloudy fluid from your surgical wound  · Fever  Can SSIs be treated?  Yes. Most surgical site infections can be treated with antibiotics. The antibiotic given to you depends on the bacteria (germs) causing the infection. Sometimes patients with SSIs also need another surgery to treat the infection.  What are some of the things that hospitals are doing to prevent SSIs?  To prevent SSIs, doctors, nurses, and other healthcare providers:  · Clean their hands and arms up to their elbows with an antiseptic agent just before the surgery.  · Clean their hands with soap and water or an alcohol-based hand rub before and after caring for each patient.  · May remove some of your hair immediately before your surgery using electric clippers if the hair is in the same area where the procedure will occur. They should not shave you with a razor.  · Wear special hair covers, masks, gowns, and gloves during surgery to keep the surgery area clean.  · Give you antibiotics before your surgery starts. In most cases, you should get antibiotics within 60 minutes before the surgery starts and the antibiotics should be stopped within 24 hours after surgery.  · Clean the skin at the site of your surgery with a special soap that kills germs.  What can I do to help prevent SSIs?  Before your surgery:  · Tell your doctor about other medical problems you may have. Health problems such as allergies,  diabetes, and obesity could affect your surgery and your treatment.  · Quit smoking. Patients who smoke get more infections. Talk to your doctor about how you can quit before your surgery.  · Do not shave near where you will have surgery. Shaving with a razor can irritate your skin and make it easier to develop an infection.  At the time of your surgery:  · Speak up if someone tries to shave you with a razor before surgery. Ask why you need to be shaved and talk with your surgeon if you have any concerns.  · Ask if you will get antibiotics before surgery.  After your surgery:  · Make sure that your healthcare providers clean their hands before examining you, either with soap and water or an alcohol-based hand rub.  · If you do not see your providers clean their hands, please ask them to do so.  · Family and friends who visit you should not touch the surgical wound or dressings.  · Family and friends should clean their hands with soap and water or an alcohol-based hand rub before and after visiting you. If you do not see them clean their hands, ask them to clean their hands.  What do I need to do when I go home from the hospital?  · Before you go home, your doctor or nurse should explain everything you need to know about taking care of your wound. Make sure you understand how to care for your wound before you leave the hospital.  · Always clean your hands before and after caring for your wound.  · Before you go home, make sure you know who to contact if you have questions or problems after you get home.  · If you have any symptoms of an infection, such as redness and pain at the surgery site, drainage, or fever, call your doctor immediately.  If you have additional questions, please ask your doctor or nurse.  Developed and co-sponsored by The Society for Healthcare Epidemiology of [place] (SHEA); Infectious Diseases Society of [place] (IDSA); American Hospital Association; Association for Professionals in Infection  Control and Epidemiology (APIC); Centers for Disease Control and Prevention (CDC); and The Joint Commission.     This information is not intended to replace advice given to you by your health care provider. Make sure you discuss any questions you have with your health care provider.     Document Released: 12/23/2014 Document Revised: 01/08/2016 Document Reviewed: 03/02/2016  [employer] Interactive Patient Education ©2016 [employer]       [facility]  CHG 4% Patient Instruction Sheet    Preparing the Skin Before Surgery  Preparing or “prepping” skin before surgery can reduce the risk of infection at the surgical site. To make the process easier,[facility] has chosen 4% Chlorhexidine Gluconate (CHG) antiseptic solution.   The steps below outline the prepping process and should be carefully followed.                                                                                                                                                      Prep the skin at the following time(s):                                                      We recommend you shower the night before surgery, and again the morning of surgery with the 4% CHG antiseptic solution using half of the bottle and a cloth each time.  Dress in clean clothes/sleepwear after showering.  See instructions below for application.          Do not apply any lotions or moisturizers.       Do not shave the area to be prepped for at least 2 days prior to surgery.    Clipping the hair may be done immediately prior to your surgery at the hospital    if needed.    Directions:  Thoroughly rinse your body with water.  Apply 4% CHG to a cloth and wash skin gently, paying special attention to the operative site.  Rinse again thoroughly.  Once you have begun using this product do not apply anything else to your skin. If itching or redness persists, rinse affected areas and discontinue use.    When using this product:  • Keep out of eyes, ears, and mouth.  • If  solution should contact these areas, rinse out promptly and thoroughly with water.  • For external use only.  • Do not use in genital area, on your face or head.

## 2017-09-07 ENCOUNTER — TELEPHONE (OUTPATIENT)
Dept: PAIN MANAGEMENT | Age: 77
End: 2017-09-07

## 2017-09-07 ENCOUNTER — OFFICE VISIT (OUTPATIENT)
Dept: INTERNAL MEDICINE | Age: 77
End: 2017-09-07
Payer: MEDICARE

## 2017-09-07 VITALS
OXYGEN SATURATION: 96 % | HEIGHT: 62 IN | WEIGHT: 226 LBS | RESPIRATION RATE: 20 BRPM | SYSTOLIC BLOOD PRESSURE: 136 MMHG | DIASTOLIC BLOOD PRESSURE: 78 MMHG | BODY MASS INDEX: 41.59 KG/M2 | HEART RATE: 92 BPM

## 2017-09-07 DIAGNOSIS — I10 ESSENTIAL HYPERTENSION: ICD-10-CM

## 2017-09-07 DIAGNOSIS — G89.4 CHRONIC PAIN SYNDROME: ICD-10-CM

## 2017-09-07 DIAGNOSIS — Z87.42 H/O VAGINAL BLEEDING: ICD-10-CM

## 2017-09-07 DIAGNOSIS — N18.30 CHRONIC KIDNEY DISEASE, STAGE 3 (MODERATE): Primary | ICD-10-CM

## 2017-09-07 DIAGNOSIS — N18.30 CHRONIC KIDNEY DISEASE, STAGE 3 (MODERATE): ICD-10-CM

## 2017-09-07 DIAGNOSIS — M48.061 SPINAL STENOSIS AT L4-L5 LEVEL: Chronic | ICD-10-CM

## 2017-09-07 PROBLEM — G89.29 CHRONIC PAIN: Status: ACTIVE | Noted: 2017-09-07

## 2017-09-07 PROCEDURE — 99214 OFFICE O/P EST MOD 30 MIN: CPT | Performed by: INTERNAL MEDICINE

## 2017-09-07 RX ORDER — CYCLOBENZAPRINE HCL 10 MG
5 TABLET ORAL 3 TIMES DAILY PRN
Qty: 270 TABLET | Refills: 1 | Status: SHIPPED | OUTPATIENT
Start: 2017-09-07 | End: 2017-10-23

## 2017-09-07 RX ORDER — BUTALBITAL, ASPIRIN, AND CAFFEINE 325; 50; 40 MG/1; MG/1; MG/1
1 CAPSULE ORAL EVERY 4 HOURS PRN
Qty: 40 CAPSULE | Refills: 2 | Status: SHIPPED | OUTPATIENT
Start: 2017-09-07 | End: 2017-12-06 | Stop reason: SDUPTHER

## 2017-09-07 RX ORDER — OXYCODONE HYDROCHLORIDE 10 MG/1
10 TABLET ORAL 4 TIMES DAILY PRN
Qty: 120 TABLET | Refills: 0 | Status: CANCELLED | OUTPATIENT
Start: 2017-09-07

## 2017-09-07 RX ORDER — CELECOXIB 200 MG/1
200 CAPSULE ORAL DAILY
Qty: 30 CAPSULE | Refills: 1 | Status: SHIPPED | OUTPATIENT
Start: 2017-09-07 | End: 2017-09-08 | Stop reason: SDUPTHER

## 2017-09-07 ASSESSMENT — PATIENT HEALTH QUESTIONNAIRE - PHQ9
SUM OF ALL RESPONSES TO PHQ QUESTIONS 1-9: 1
2. FEELING DOWN, DEPRESSED OR HOPELESS: 1
SUM OF ALL RESPONSES TO PHQ9 QUESTIONS 1 & 2: 1
1. LITTLE INTEREST OR PLEASURE IN DOING THINGS: 0

## 2017-09-07 ASSESSMENT — ENCOUNTER SYMPTOMS
COUGH: 0
NAUSEA: 0
SORE THROAT: 0
SHORTNESS OF BREATH: 0
TROUBLE SWALLOWING: 0
ABDOMINAL PAIN: 0
BACK PAIN: 1
RHINORRHEA: 0

## 2017-09-08 DIAGNOSIS — M48.061 SPINAL STENOSIS AT L4-L5 LEVEL: Chronic | ICD-10-CM

## 2017-09-08 DIAGNOSIS — N18.30 CHRONIC KIDNEY DISEASE, STAGE 3 (MODERATE): ICD-10-CM

## 2017-09-08 DIAGNOSIS — I10 ESSENTIAL HYPERTENSION: ICD-10-CM

## 2017-09-08 DIAGNOSIS — Z87.42 H/O VAGINAL BLEEDING: ICD-10-CM

## 2017-09-08 DIAGNOSIS — G89.4 CHRONIC PAIN SYNDROME: ICD-10-CM

## 2017-09-08 LAB
BACTERIA SPEC AEROBE CULT: ABNORMAL
BACTERIA SPEC AEROBE CULT: ABNORMAL

## 2017-09-08 RX ORDER — CELECOXIB 200 MG/1
CAPSULE ORAL
Qty: 16 CAPSULE | Refills: 2 | Status: SHIPPED | OUTPATIENT
Start: 2017-09-08 | End: 2017-09-12 | Stop reason: SDUPTHER

## 2017-09-12 ENCOUNTER — TELEPHONE (OUTPATIENT)
Dept: INTERNAL MEDICINE | Age: 77
End: 2017-09-12

## 2017-09-12 DIAGNOSIS — N18.30 CHRONIC KIDNEY DISEASE, STAGE 3 (MODERATE): ICD-10-CM

## 2017-09-12 DIAGNOSIS — I10 ESSENTIAL HYPERTENSION: ICD-10-CM

## 2017-09-12 DIAGNOSIS — M48.061 SPINAL STENOSIS AT L4-L5 LEVEL: Chronic | ICD-10-CM

## 2017-09-12 DIAGNOSIS — Z87.42 H/O VAGINAL BLEEDING: ICD-10-CM

## 2017-09-12 DIAGNOSIS — G89.4 CHRONIC PAIN SYNDROME: ICD-10-CM

## 2017-09-12 RX ORDER — CELECOXIB 100 MG/1
CAPSULE ORAL
Qty: 50 CAPSULE | Refills: 2 | Status: SHIPPED | OUTPATIENT
Start: 2017-09-12 | End: 2017-12-12

## 2017-09-13 ENCOUNTER — HOSPITAL ENCOUNTER (OUTPATIENT)
Dept: PAIN MANAGEMENT | Age: 77
Discharge: HOME | End: 2017-09-13
Payer: MEDICARE

## 2017-09-13 VITALS
HEART RATE: 79 BPM | DIASTOLIC BLOOD PRESSURE: 82 MMHG | WEIGHT: 226 LBS | TEMPERATURE: 97.2 F | SYSTOLIC BLOOD PRESSURE: 138 MMHG | RESPIRATION RATE: 18 BRPM | BODY MASS INDEX: 41.59 KG/M2 | HEIGHT: 62 IN | OXYGEN SATURATION: 98 %

## 2017-09-13 DIAGNOSIS — M70.62 TROCHANTERIC BURSITIS OF BOTH HIPS: ICD-10-CM

## 2017-09-13 DIAGNOSIS — M48.061 SPINAL STENOSIS AT L4-L5 LEVEL: ICD-10-CM

## 2017-09-13 DIAGNOSIS — M47.816 LUMBAR FACET ARTHROPATHY: ICD-10-CM

## 2017-09-13 DIAGNOSIS — M70.61 TROCHANTERIC BURSITIS OF BOTH HIPS: ICD-10-CM

## 2017-09-13 PROCEDURE — 99213 OFFICE O/P EST LOW 20 MIN: CPT

## 2017-09-13 RX ORDER — OXYCODONE HYDROCHLORIDE 10 MG/1
10 TABLET ORAL 4 TIMES DAILY PRN
Qty: 120 TABLET | Refills: 0 | Status: SHIPPED | OUTPATIENT
Start: 2017-09-13 | End: 2017-10-02 | Stop reason: SDUPTHER

## 2017-09-13 ASSESSMENT — PAIN DESCRIPTION - ONSET: ONSET: ON-GOING

## 2017-09-13 ASSESSMENT — PAIN DESCRIPTION - DESCRIPTORS: DESCRIPTORS: ACHING;BURNING;CONSTANT;RADIATING;SHARP

## 2017-09-13 ASSESSMENT — PAIN DESCRIPTION - PAIN TYPE: TYPE: CHRONIC PAIN

## 2017-09-13 ASSESSMENT — ACTIVITIES OF DAILY LIVING (ADL): EFFECT OF PAIN ON DAILY ACTIVITIES: LIMITS ACTIVITY

## 2017-09-13 ASSESSMENT — PAIN SCALES - GENERAL: PAINLEVEL_OUTOF10: 8

## 2017-09-13 ASSESSMENT — PAIN DESCRIPTION - PROGRESSION: CLINICAL_PROGRESSION: NOT CHANGED

## 2017-09-13 ASSESSMENT — PAIN DESCRIPTION - ORIENTATION: ORIENTATION: LOWER

## 2017-09-13 ASSESSMENT — PAIN DESCRIPTION - LOCATION: LOCATION: BACK

## 2017-09-13 ASSESSMENT — PAIN DESCRIPTION - FREQUENCY: FREQUENCY: CONTINUOUS

## 2017-09-19 ENCOUNTER — TELEPHONE (OUTPATIENT)
Dept: INTERNAL MEDICINE | Age: 77
End: 2017-09-19

## 2017-09-25 ENCOUNTER — ANESTHESIA EVENT (OUTPATIENT)
Dept: GASTROENTEROLOGY | Facility: HOSPITAL | Age: 77
End: 2017-09-25

## 2017-09-25 ENCOUNTER — ANESTHESIA (OUTPATIENT)
Dept: GASTROENTEROLOGY | Facility: HOSPITAL | Age: 77
End: 2017-09-25

## 2017-09-25 ENCOUNTER — HOSPITAL ENCOUNTER (OUTPATIENT)
Facility: HOSPITAL | Age: 77
Setting detail: HOSPITAL OUTPATIENT SURGERY
Discharge: HOME | End: 2017-09-25
Attending: INTERNAL MEDICINE | Admitting: INTERNAL MEDICINE

## 2017-09-25 VITALS
HEART RATE: 73 BPM | HEIGHT: 62 IN | BODY MASS INDEX: 41.96 KG/M2 | RESPIRATION RATE: 15 BRPM | TEMPERATURE: 97.2 F | DIASTOLIC BLOOD PRESSURE: 62 MMHG | OXYGEN SATURATION: 96 % | WEIGHT: 228 LBS | SYSTOLIC BLOOD PRESSURE: 147 MMHG

## 2017-09-25 DIAGNOSIS — K21.9 GASTROESOPHAGEAL REFLUX DISEASE, ESOPHAGITIS PRESENCE NOT SPECIFIED: ICD-10-CM

## 2017-09-25 PROCEDURE — 25010000002 PROPOFOL 10 MG/ML EMULSION: Performed by: NURSE ANESTHETIST, CERTIFIED REGISTERED

## 2017-09-25 PROCEDURE — 87081 CULTURE SCREEN ONLY: CPT | Performed by: INTERNAL MEDICINE

## 2017-09-25 PROCEDURE — 43239 EGD BIOPSY SINGLE/MULTIPLE: CPT | Performed by: INTERNAL MEDICINE

## 2017-09-25 RX ORDER — LIDOCAINE HYDROCHLORIDE 20 MG/ML
INJECTION, SOLUTION INFILTRATION; PERINEURAL AS NEEDED
Status: DISCONTINUED | OUTPATIENT
Start: 2017-09-25 | End: 2017-09-25 | Stop reason: SURG

## 2017-09-25 RX ORDER — PROPOFOL 10 MG/ML
VIAL (ML) INTRAVENOUS AS NEEDED
Status: DISCONTINUED | OUTPATIENT
Start: 2017-09-25 | End: 2017-09-25 | Stop reason: SURG

## 2017-09-25 RX ORDER — SODIUM CHLORIDE 0.9 % (FLUSH) 0.9 %
3 SYRINGE (ML) INJECTION AS NEEDED
Status: DISCONTINUED | OUTPATIENT
Start: 2017-09-25 | End: 2017-09-25 | Stop reason: HOSPADM

## 2017-09-25 RX ORDER — SODIUM CHLORIDE 9 MG/ML
500 INJECTION, SOLUTION INTRAVENOUS CONTINUOUS PRN
Status: DISCONTINUED | OUTPATIENT
Start: 2017-09-25 | End: 2017-09-25 | Stop reason: HOSPADM

## 2017-09-25 RX ADMIN — SODIUM CHLORIDE 500 ML: 9 INJECTION, SOLUTION INTRAVENOUS at 09:36

## 2017-09-25 RX ADMIN — PROPOFOL 100 MG: 10 INJECTION, EMULSION INTRAVENOUS at 11:15

## 2017-09-25 RX ADMIN — LIDOCAINE HYDROCHLORIDE 100 MG: 20 INJECTION, SOLUTION INFILTRATION; PERINEURAL at 11:15

## 2017-09-25 NOTE — PLAN OF CARE
Problem: GI Endoscopy (Adult)  Goal: Signs and Symptoms of Listed Potential Problems Will be Absent or Manageable (GI Endoscopy)  Outcome: Outcome(s) achieved Date Met:  09/25/17

## 2017-09-25 NOTE — H&P
[facility] Gastroenterology  Pre Procedure History & Physical    Chief Complaint:   GERD    Subjective     HPI:   GERD    Past Medical History:   Past Medical History:   Diagnosis Date   • Anxiety    • Arthritis    • Chronic pain    • Depression    • Disease of thyroid gland    • Fibromyalgia    • Hyperlipidemia    • Hypertension    • Incontinence    • Insomnia    • Lumbar stenosis    • Peptic ulcer    • Postmenopausal bleeding    • Restless legs        Past Surgical History:  [unfilled]    Family History:  Family History   Problem Relation Age of Onset   • Diabetes Mother    • Diabetes Sister    • Ovarian cancer Paternal Aunt    • Colon cancer Neg Hx    • Esophageal cancer Neg Hx        Social History:   reports that she has never smoked. She has never used smokeless tobacco. She reports that she does not drink alcohol or use illicit drugs.    Medications:   Prior to Admission medications    Medication Sig Start Date End Date Taking? Authorizing Provider   atorvastatin (LIPITOR) 40 MG tablet Take 40 mg by mouth Daily.   Yes Historical Provider, MD   butalbital-aspirin-caffeine (FIORINAL) -40 MG capsule Take  by mouth. 6/20/17  Yes Historical Provider, MD   carvedilol (COREG) 6.25 MG tablet TAKE 1 TABLET TWICE A DAY 6/21/17  Yes Historical Provider, MD   cyclobenzaprine (FLEXERIL) 10 MG tablet Take 10 mg by mouth 3 (Three) Times a Day As Needed. 9/19/16  Yes Historical Provider, MD   diltiazem XR (DILACOR XR) 240 MG 24 hr capsule Take  by mouth. 7/15/15  Yes Historical Provider, MD   FIBER FORMULA capsule Take  by mouth. 3/7/16  Yes Historical Provider, MD   irbesartan (AVAPRO) 300 MG tablet Take  by mouth.   Yes Historical Provider, MD   lansoprazole (PREVACID) 30 MG capsule Take  by mouth.   Yes Historical Provider, MD   levothyroxine (SYNTHROID, LEVOTHROID) 50 MCG tablet 1 tablet daily 6/10/17  Yes Historical Provider, MD   oxyCODONE (ROXICODONE) 10 MG tablet Take 10 mg by mouth Every 6 (Six) Hours As Needed  "for Moderate Pain  (GENERAL PAIN, HIPS). DR VANEGAS PRESCRIBES HAS BEEN ON FOR14 YEARS 8/9/17  Yes Historical Provider, MD   APAP-isometheptene-dichloral -325 MG per capsule Take 1 capsule by mouth 4 (Four) Times a Day As Needed for Headache.    Historical Provider, MD   aspirin  MG tablet Take 650 mg by mouth 3 (Three) Times a Day As Needed for Mild Pain . WILL STOP FOR SURGERY    Historical Provider, MD   carbidopa-levodopa (SINEMET)  MG per tablet Take  by mouth. 6/13/17   Historical Provider, MD   SUMAtriptan (IMITREX) 50 MG tablet  6/30/17   Historical Provider, MD   vitamin D (ERGOCALCIFEROL) 06560 units capsule capsule Take 50,000 Units by mouth 1 (One) Time Per Week.    Historical Provider, MD       Allergies:  Codeine; Ropinirole hcl; Ambien [zolpidem]; Eszopiclone; Pregabalin; and Tizanidine    Objective     Blood pressure 135/59, pulse 78, temperature 97.2 °F (36.2 °C), temperature source Temporal Artery , resp. rate 20, height 62\" (157.5 cm), weight 228 lb (103 kg), SpO2 93 %.    Physical Exam   Constitutional: Pt is oriented to person, place, and in no distress.   HENT: Mouth/Throat: Oropharynx is clear.   Cardiovascular: Normal rate, regular rhythm.    Pulmonary/Chest: Effort normal. No respiratory distress. No  wheezes.   Abdominal: Soft. Non-distended.  Skin: Skin is warm and dry.   Psychiatric: Mood, memory, affect and judgment appear normal.     Assessment/Plan     Diagnosis:  GERD    Anticipated Surgical Procedure:  EGD    The risks, benefits, and alternatives of this procedure have been discussed with the patient or the responsible party- the patient understands and agrees to proceed.        "

## 2017-09-25 NOTE — ANESTHESIA POSTPROCEDURE EVALUATION
"Patient: Jennifer Gomes    Procedure Summary     Date Anesthesia Start Anesthesia Stop Room / Location    09/25/17 1110 [facility] ENDOSCOPY 4 / [facility]       Procedure Diagnosis Surgeon Provider    ESOPHAGOGASTRODUODENOSCOPY WITH ANESTHESIA (N/A Esophagus) Gastroesophageal reflux disease, esophagitis presence not specified  (Gastroesophageal reflux disease, esophagitis presence not specified [K21.9]) DO Bean Brar CRNA          Anesthesia Type: general  Last vitals  BP        Temp        Pulse       Resp        SpO2          Post Anesthesia Care and Evaluation    Patient location during evaluation: PHASE II  Patient participation: complete - patient participated  Level of consciousness: awake and awake and alert  Pain score: 0  Pain management: adequate  Airway patency: patent  Anesthetic complications: No anesthetic complications  PONV Status: none  Cardiovascular status: acceptable and blood pressure returned to baseline  Respiratory status: acceptable  Hydration status: acceptable    Comments: Blood pressure 135/59, pulse 78, temperature 97.2 °F (36.2 °C), temperature source Temporal Artery , resp. rate 20, height 62\" (157.5 cm), weight 228 lb (103 kg), SpO2 93 %.        "

## 2017-09-25 NOTE — ANESTHESIA PREPROCEDURE EVALUATION
Anesthesia Evaluation     Patient summary reviewed   no history of anesthetic complications:  NPO Solid Status: > 8 hours  NPO Liquid Status: > 8 hours     Airway   Mallampati: II  TM distance: >3 FB  Neck ROM: full  no difficulty expected  Dental - normal exam         Pulmonary - normal exam   (-) COPD, asthma, sleep apnea, not a smoker  Cardiovascular - normal exam  Exercise tolerance: good (4-7 METS)    ECG reviewed  Patient on routine beta blocker and Beta blocker given within 24 hours of surgery    (+) hypertension, hyperlipidemia      Neuro/Psych  (-) seizures, TIA, CVA  GI/Hepatic/Renal/Endo    (+) obesity, morbid obesity, GERD, PUD,   (-) liver disease, no renal disease, diabetes    Musculoskeletal     (+) back pain, myalgias,   Abdominal    Substance History      OB/GYN          Other   (+) arthritis                                     Anesthesia Plan    ASA 3     general   total IV anesthesia  intravenous induction   Anesthetic plan and risks discussed with patient.

## 2017-09-25 NOTE — PLAN OF CARE
Problem: Patient Care Overview (Adult)  Goal: Plan of Care Review  Outcome: Outcome(s) achieved Date Met:  09/25/17 09/25/17 1137   Patient Care Overview   Progress improving   Outcome Evaluation   Outcome Summary/Follow up Plan D/C CRITERIA MET   Coping/Psychosocial Response Interventions   Plan Of Care Reviewed With patient;spouse

## 2017-09-25 NOTE — PLAN OF CARE
Problem: Patient Care Overview (Adult)  Goal: Plan of Care Review  Outcome: Ongoing (interventions implemented as appropriate)    09/25/17 1115   Patient Care Overview   Progress improving   Outcome Evaluation   Outcome Summary/Follow up Plan no noted problems

## 2017-09-26 LAB — UREASE TISS QL: NEGATIVE

## 2017-10-03 RX ORDER — OXYCODONE HYDROCHLORIDE 10 MG/1
10 TABLET ORAL 4 TIMES DAILY PRN
Qty: 120 TABLET | Refills: 0 | Status: SHIPPED | OUTPATIENT
Start: 2017-10-13 | End: 2017-10-23 | Stop reason: SDUPTHER

## 2017-10-18 ENCOUNTER — OFFICE VISIT (OUTPATIENT)
Dept: GASTROENTEROLOGY | Facility: CLINIC | Age: 77
End: 2017-10-18

## 2017-10-18 VITALS
WEIGHT: 217 LBS | HEIGHT: 62 IN | DIASTOLIC BLOOD PRESSURE: 74 MMHG | HEART RATE: 80 BPM | SYSTOLIC BLOOD PRESSURE: 136 MMHG | TEMPERATURE: 98 F | BODY MASS INDEX: 39.93 KG/M2 | OXYGEN SATURATION: 99 %

## 2017-10-18 DIAGNOSIS — K21.9 GASTROESOPHAGEAL REFLUX DISEASE WITHOUT ESOPHAGITIS: ICD-10-CM

## 2017-10-18 DIAGNOSIS — K25.9 GASTRIC ULCER WITHOUT HEMORRHAGE OR PERFORATION, UNSPECIFIED CHRONICITY: Primary | ICD-10-CM

## 2017-10-18 PROCEDURE — 99213 OFFICE O/P EST LOW 20 MIN: CPT | Performed by: INTERNAL MEDICINE

## 2017-10-18 NOTE — PROGRESS NOTES
Chief Complaint   Patient presents with   • Endoscopy     had endo 9-25-17 had gastric ulcer     Subjective   HPI     Jennifer Gomes is a 77 y.o. female who underwent endoscopy on 9/25/17 for further evaluation of GERD  Pt tolerated procedure well without any complications.   Endoscopically She  was found to have gastric ulcers.  Biopsies were taken during procedure.  Histologically biopsies taken during procedure were found to be negative for H pylori  She currently denies difficulty with abdominal pain, changes in bowels.  Maintained on Prevacid daily.  ASA daily and Celebrex every other day.  GERD related symptoms have improved.    Past Medical History:   Diagnosis Date   • Anxiety    • Arthritis    • Chronic pain    • Depression    • Disease of thyroid gland    • Fibromyalgia    • Hyperlipidemia    • Hypertension    • Incontinence    • Insomnia    • Lumbar stenosis    • Peptic ulcer    • Postmenopausal bleeding    • Restless legs      Outpatient Prescriptions Marked as Taking for the 10/18/17 encounter (Office Visit) with Tom Velasco, DO   Medication Sig Dispense Refill   • APAP-isometheptene-dichloral -325 MG per capsule Take 1 capsule by mouth 4 (Four) Times a Day As Needed for Headache.     • atorvastatin (LIPITOR) 40 MG tablet Take 40 mg by mouth Daily.     • butalbital-aspirin-caffeine (FIORINAL) -40 MG capsule Take  by mouth.     • carbidopa-levodopa (SINEMET)  MG per tablet Take  by mouth.     • carvedilol (COREG) 6.25 MG tablet TAKE 1 TABLET TWICE A DAY     • diltiazem XR (DILACOR XR) 240 MG 24 hr capsule Take  by mouth.     • FIBER FORMULA capsule Take  by mouth.     • irbesartan (AVAPRO) 300 MG tablet Take  by mouth.     • lansoprazole (PREVACID) 30 MG capsule Take  by mouth.     • levothyroxine (SYNTHROID, LEVOTHROID) 50 MCG tablet 1 tablet daily     • oxyCODONE (ROXICODONE) 10 MG tablet Take 10 mg by mouth Every 6 (Six) Hours As Needed for Moderate Pain  (GENERAL PAIN, HIPS).   GUILHERME PRESCRIBES HAS BEEN ON FOR14 YEARS     • SUMAtriptan (IMITREX) 50 MG tablet      • vitamin D (ERGOCALCIFEROL) 42661 units capsule capsule Take 50,000 Units by mouth 1 (One) Time Per Week.       Current Facility-Administered Medications for the 10/18/17 encounter (Office Visit) with Tom Velasco DO   Medication Dose Route Frequency Provider Last Rate Last Dose   • sodium bicarbonate injection 2.5 mEq  5 mL Injection Once Dariela Pool MD         Allergies   Allergen Reactions   • Codeine Mental Status Change and Itching   • Ropinirole Hcl Shortness Of Breath   • Ambien [Zolpidem] Other (See Comments)     HYPER    • Eszopiclone Other (See Comments)     MAKES PT HYPER    • Pregabalin Dizziness   • Tizanidine Other (See Comments)     Terrible nightmares     Social History     Social History   • Marital status:      Spouse name: N/A   • Number of children: N/A   • Years of education: N/A     Occupational History   • Not on file.     Social History Main Topics   • Smoking status: Never Smoker   • Smokeless tobacco: Never Used   • Alcohol use No   • Drug use: No   • Sexual activity: Defer     Other Topics Concern   • Not on file     Social History Narrative     Family History   Problem Relation Age of Onset   • Diabetes Mother    • Diabetes Sister    • Ovarian cancer Paternal Aunt    • Colon cancer Neg Hx    • Esophageal cancer Neg Hx      Review of Systems   Constitutional: Negative for fatigue, fever and unexpected weight change.   HENT: Negative for hearing loss, sore throat and voice change.    Eyes: Negative for visual disturbance.   Respiratory: Negative for cough, shortness of breath and wheezing.    Cardiovascular: Negative for chest pain and palpitations.   Gastrointestinal: Negative for abdominal pain, blood in stool and vomiting.   Endocrine: Negative for polydipsia and polyuria.   Genitourinary: Negative for difficulty urinating, dysuria, hematuria and urgency.   Musculoskeletal: Negative for  joint swelling and myalgias.   Skin: Negative for color change, rash and wound.   Neurological: Negative for dizziness, tremors, seizures and syncope.   Hematological: Does not bruise/bleed easily.   Psychiatric/Behavioral: Negative for agitation and confusion. The patient is not nervous/anxious.      Objective   Vitals:    10/18/17 1441   BP: 136/74   Pulse: 80   Temp: 98 °F (36.7 °C)   SpO2: 99%     Physical Exam   Constitutional: She is oriented to person, place, and time. She appears well-developed and well-nourished.   HENT:   Head: Normocephalic and atraumatic.   Eyes: Conjunctivae are normal. Pupils are equal, round, and reactive to light. No scleral icterus.   Neck: No JVD present. No thyroid mass and no thyromegaly present.   Cardiovascular: Normal rate, regular rhythm and normal heart sounds.  Exam reveals no gallop and no friction rub.    No murmur heard.  Pulmonary/Chest: Effort normal and breath sounds normal. No accessory muscle usage. No respiratory distress. She has no wheezes. She has no rales.   Abdominal: Soft. Bowel sounds are normal. She exhibits no distension, no ascites and no mass. There is no splenomegaly or hepatomegaly. There is no tenderness. There is no rebound and no guarding.   Genitourinary:   Genitourinary Comments: Rectal-Did not examine   Musculoskeletal: Normal range of motion. She exhibits no edema.   Neurological: She is alert and oriented to person, place, and time.   Deemed a reliable historian, able to converse without difficulty and able to move all extremities without difficulty   Skin: Skin is warm and dry.   Psychiatric: She has a normal mood and affect. Her behavior is normal.     Imaging Results (most recent)     None        Assessment/Plan   Jennifer was seen today for endoscopy.    Diagnoses and all orders for this visit:    Gastric ulcer without hemorrhage or perforation, unspecified chronicity    Gastroesophageal reflux disease without esophagitis      * Surgery not  found *     If take Celebrex take additional prevacid, try to minimize use Celebrex to 2 times per week  GERD info given to pt  EGD reviewed with pt  Discussed cscope-pt did not wish to proceed (has been over 10 yr)

## 2017-10-19 ENCOUNTER — PROCEDURE VISIT (OUTPATIENT)
Dept: OBSTETRICS AND GYNECOLOGY | Facility: CLINIC | Age: 77
End: 2017-10-19

## 2017-10-19 ENCOUNTER — OFFICE VISIT (OUTPATIENT)
Dept: OBSTETRICS AND GYNECOLOGY | Facility: CLINIC | Age: 77
End: 2017-10-19

## 2017-10-19 VITALS
HEIGHT: 62 IN | WEIGHT: 213 LBS | SYSTOLIC BLOOD PRESSURE: 128 MMHG | DIASTOLIC BLOOD PRESSURE: 70 MMHG | BODY MASS INDEX: 39.2 KG/M2

## 2017-10-19 DIAGNOSIS — T83.711A EROSION OF VAGINAL MESH, INITIAL ENCOUNTER (HCC): Primary | ICD-10-CM

## 2017-10-19 DIAGNOSIS — Z01.818 PRE-OP EVALUATION: ICD-10-CM

## 2017-10-19 DIAGNOSIS — M54.9 CHRONIC BACK PAIN, UNSPECIFIED BACK LOCATION, UNSPECIFIED BACK PAIN LATERALITY: ICD-10-CM

## 2017-10-19 DIAGNOSIS — N93.9 VAGINAL BLEEDING: ICD-10-CM

## 2017-10-19 DIAGNOSIS — I10 HYPERTENSION, ESSENTIAL: ICD-10-CM

## 2017-10-19 DIAGNOSIS — G89.29 CHRONIC BACK PAIN, UNSPECIFIED BACK LOCATION, UNSPECIFIED BACK PAIN LATERALITY: ICD-10-CM

## 2017-10-19 DIAGNOSIS — R93.89 THICKENED ENDOMETRIUM: ICD-10-CM

## 2017-10-19 DIAGNOSIS — N95.0 PMB (POSTMENOPAUSAL BLEEDING): Primary | ICD-10-CM

## 2017-10-19 PROCEDURE — 99214 OFFICE O/P EST MOD 30 MIN: CPT | Performed by: OBSTETRICS & GYNECOLOGY

## 2017-10-19 PROCEDURE — 76830 TRANSVAGINAL US NON-OB: CPT | Performed by: OBSTETRICS & GYNECOLOGY

## 2017-10-23 ENCOUNTER — HOSPITAL ENCOUNTER (OUTPATIENT)
Dept: PAIN MANAGEMENT | Age: 77
Discharge: HOME | End: 2017-10-23
Payer: MEDICARE

## 2017-10-23 ENCOUNTER — APPOINTMENT (OUTPATIENT)
Dept: PREADMISSION TESTING | Facility: HOSPITAL | Age: 77
End: 2017-10-23

## 2017-10-23 ENCOUNTER — HOSPITAL ENCOUNTER (OUTPATIENT)
Dept: GENERAL RADIOLOGY | Facility: HOSPITAL | Age: 77
Discharge: HOME | End: 2017-10-23
Admitting: OBSTETRICS & GYNECOLOGY

## 2017-10-23 VITALS
DIASTOLIC BLOOD PRESSURE: 76 MMHG | RESPIRATION RATE: 16 BRPM | SYSTOLIC BLOOD PRESSURE: 164 MMHG | HEIGHT: 62 IN | OXYGEN SATURATION: 100 % | TEMPERATURE: 97.4 F | BODY MASS INDEX: 41.77 KG/M2 | HEART RATE: 95 BPM | WEIGHT: 227 LBS

## 2017-10-23 VITALS
SYSTOLIC BLOOD PRESSURE: 156 MMHG | OXYGEN SATURATION: 98 % | DIASTOLIC BLOOD PRESSURE: 72 MMHG | HEIGHT: 62 IN | HEART RATE: 74 BPM | BODY MASS INDEX: 39.93 KG/M2 | WEIGHT: 217 LBS

## 2017-10-23 DIAGNOSIS — T83.711A EROSION OF VAGINAL MESH, INITIAL ENCOUNTER (HCC): ICD-10-CM

## 2017-10-23 DIAGNOSIS — R93.89 THICKENED ENDOMETRIUM: ICD-10-CM

## 2017-10-23 DIAGNOSIS — M47.816 LUMBAR FACET ARTHROPATHY: Chronic | ICD-10-CM

## 2017-10-23 DIAGNOSIS — M54.59 LUMBAR FACET JOINT PAIN: ICD-10-CM

## 2017-10-23 DIAGNOSIS — Z01.818 PRE-OP EVALUATION: ICD-10-CM

## 2017-10-23 LAB
ANION GAP SERPL CALCULATED.3IONS-SCNC: 11 MMOL/L (ref 4–13)
BASOPHILS # BLD AUTO: 0.01 10*3/MM3 (ref 0–0.2)
BASOPHILS NFR BLD AUTO: 0.1 % (ref 0–2)
BUN BLD-MCNC: 18 MG/DL (ref 5–21)
BUN/CREAT SERPL: 17.6 (ref 7–25)
CALCIUM SPEC-SCNC: 9.9 MG/DL (ref 8.4–10.4)
CHLORIDE SERPL-SCNC: 104 MMOL/L (ref 98–110)
CO2 SERPL-SCNC: 27 MMOL/L (ref 24–31)
CREAT BLD-MCNC: 1.02 MG/DL (ref 0.5–1.4)
DEPRECATED RDW RBC AUTO: 41.1 FL (ref 40–54)
EOSINOPHIL # BLD AUTO: 0.29 10*3/MM3 (ref 0–0.7)
EOSINOPHIL NFR BLD AUTO: 4.2 % (ref 0–4)
ERYTHROCYTE [DISTWIDTH] IN BLOOD BY AUTOMATED COUNT: 12.7 % (ref 12–15)
GFR SERPL CREATININE-BSD FRML MDRD: 53 ML/MIN/1.73
GLUCOSE BLD-MCNC: 101 MG/DL (ref 70–100)
HCT VFR BLD AUTO: 35.5 % (ref 37–47)
HGB BLD-MCNC: 12 G/DL (ref 12–16)
IMM GRANULOCYTES # BLD: 0.01 10*3/MM3 (ref 0–0.03)
IMM GRANULOCYTES NFR BLD: 0.1 % (ref 0–5)
LYMPHOCYTES # BLD AUTO: 2.08 10*3/MM3 (ref 0.72–4.86)
LYMPHOCYTES NFR BLD AUTO: 30.4 % (ref 15–45)
MCH RBC QN AUTO: 29.8 PG (ref 28–32)
MCHC RBC AUTO-ENTMCNC: 33.8 G/DL (ref 33–36)
MCV RBC AUTO: 88.1 FL (ref 82–98)
MONOCYTES # BLD AUTO: 0.76 10*3/MM3 (ref 0.19–1.3)
MONOCYTES NFR BLD AUTO: 11.1 % (ref 4–12)
NEUTROPHILS # BLD AUTO: 3.69 10*3/MM3 (ref 1.87–8.4)
NEUTROPHILS NFR BLD AUTO: 54.1 % (ref 39–78)
PLATELET # BLD AUTO: 198 10*3/MM3 (ref 130–400)
PMV BLD AUTO: 12 FL (ref 6–12)
POTASSIUM BLD-SCNC: 4.1 MMOL/L (ref 3.5–5.3)
RBC # BLD AUTO: 4.03 10*6/MM3 (ref 4.2–5.4)
SODIUM BLD-SCNC: 142 MMOL/L (ref 135–145)
WBC NRBC COR # BLD: 6.84 10*3/MM3 (ref 4.8–10.8)

## 2017-10-23 PROCEDURE — G0260 INJ FOR SACROILIAC JT ANESTH: HCPCS

## 2017-10-23 PROCEDURE — 80048 BASIC METABOLIC PNL TOTAL CA: CPT | Performed by: OBSTETRICS & GYNECOLOGY

## 2017-10-23 PROCEDURE — 93005 ELECTROCARDIOGRAM TRACING: CPT

## 2017-10-23 PROCEDURE — 2500000003 HC RX 250 WO HCPCS

## 2017-10-23 PROCEDURE — 3209999900 FLUORO FOR SURGICAL PROCEDURES

## 2017-10-23 PROCEDURE — 64493 INJ PARAVERT F JNT L/S 1 LEV: CPT

## 2017-10-23 PROCEDURE — 6360000002 HC RX W HCPCS

## 2017-10-23 PROCEDURE — 85025 COMPLETE CBC W/AUTO DIFF WBC: CPT | Performed by: OBSTETRICS & GYNECOLOGY

## 2017-10-23 PROCEDURE — 71010 HC CHEST PA OR AP: CPT

## 2017-10-23 PROCEDURE — 36415 COLL VENOUS BLD VENIPUNCTURE: CPT

## 2017-10-23 PROCEDURE — 64494 INJ PARAVERT F JNT L/S 2 LEV: CPT

## 2017-10-23 PROCEDURE — 93010 ELECTROCARDIOGRAM REPORT: CPT | Performed by: INTERNAL MEDICINE

## 2017-10-23 PROCEDURE — 80307 DRUG TEST PRSMV CHEM ANLYZR: CPT

## 2017-10-23 RX ORDER — TRIAMCINOLONE ACETONIDE 40 MG/ML
INJECTION, SUSPENSION INTRA-ARTICULAR; INTRAMUSCULAR
Status: COMPLETED | OUTPATIENT
Start: 2017-10-23 | End: 2017-10-23

## 2017-10-23 RX ORDER — BUPIVACAINE HYDROCHLORIDE 5 MG/ML
INJECTION, SOLUTION EPIDURAL; INTRACAUDAL
Status: COMPLETED | OUTPATIENT
Start: 2017-10-23 | End: 2017-10-23

## 2017-10-23 RX ORDER — OXYCODONE HYDROCHLORIDE 15 MG/1
15 TABLET ORAL EVERY 4 HOURS PRN
Qty: 120 TABLET | Refills: 0 | Status: SHIPPED | OUTPATIENT
Start: 2017-11-11 | End: 2017-11-30 | Stop reason: SDUPTHER

## 2017-10-23 RX ORDER — PRAMIPEXOLE DIHYDROCHLORIDE 0.5 MG/1
0.5 TABLET ORAL 3 TIMES DAILY
Qty: 90 TABLET | Refills: 3 | Status: SHIPPED | OUTPATIENT
Start: 2017-10-23 | End: 2017-12-12

## 2017-10-23 RX ORDER — OXYCODONE HYDROCHLORIDE 10 MG/1
10 TABLET ORAL 4 TIMES DAILY PRN
Qty: 120 TABLET | Refills: 0 | Status: SHIPPED | OUTPATIENT
Start: 2017-11-11 | End: 2017-11-30 | Stop reason: CLARIF

## 2017-10-23 RX ADMIN — TRIAMCINOLONE ACETONIDE 20 MG: 40 INJECTION, SUSPENSION INTRA-ARTICULAR; INTRAMUSCULAR at 15:10

## 2017-10-23 RX ADMIN — BUPIVACAINE HYDROCHLORIDE 0.5 ML: 5 INJECTION, SOLUTION EPIDURAL; INTRACAUDAL at 15:09

## 2017-10-23 RX ADMIN — TRIAMCINOLONE ACETONIDE 2 MG: 40 INJECTION, SUSPENSION INTRA-ARTICULAR; INTRAMUSCULAR at 15:09

## 2017-10-23 RX ADMIN — BUPIVACAINE HYDROCHLORIDE 4.5 ML: 5 INJECTION, SOLUTION EPIDURAL; INTRACAUDAL at 15:10

## 2017-10-23 ASSESSMENT — PAIN DESCRIPTION - DESCRIPTORS: DESCRIPTORS: THROBBING;SHOOTING

## 2017-10-23 ASSESSMENT — ACTIVITIES OF DAILY LIVING (ADL): EFFECT OF PAIN ON DAILY ACTIVITIES: LIMITS ACTIVITIES

## 2017-10-23 ASSESSMENT — PAIN - FUNCTIONAL ASSESSMENT
PAIN_FUNCTIONAL_ASSESSMENT: 0-10
PAIN_FUNCTIONAL_ASSESSMENT: 0-10

## 2017-10-23 NOTE — PROGRESS NOTES
[facility]  Jennifer Gomes  : 1940  MRN: [id number]  CSN: [id number]    History and Physical    Subjective   Jennifer Gomes is a 77 y.o. year old  who presents for consultation about surgery due to vaginal bleeding.  The patient had surgery in  to remove exposed vaginal mesh from a mid-urethral sling.  She reports that for the past year she has been having vaginal bleeding again, and suspects that there is exposed mesh again.  She reports that bleeding is intermittent, but also comments that sometimes it is really heavy and bright red - like a period.    Past Medical History:   Diagnosis Date   • Anxiety    • Arthritis    • Chronic pain    • Depression    • Disease of thyroid gland    • Fibromyalgia    • Hyperlipidemia    • Hypertension    • Incontinence    • Insomnia    • Lumbar stenosis    • Peptic ulcer    • Postmenopausal bleeding    • Restless legs      Past Surgical History:   Procedure Laterality Date   • BLADDER REPAIR      MESH HAD TO BE REMOVED IN    • BREAST CYST EXCISION     • CARPAL TUNNEL RELEASE     • CATARACT EXTRACTION W/ INTRAOCULAR LENS  IMPLANT, BILATERAL     • CYSTECTOMY     • DILATION AND CURETTAGE, DIAGNOSTIC / THERAPEUTIC     • ENDOSCOPY  2010    Short segment of Arriola's,Moderate chroninc esophagogastritis and negative H.pylori   • ENDOSCOPY N/A 2017    Procedure: ESOPHAGOGASTRODUODENOSCOPY WITH ANESTHESIA;  Surgeon: Tom Velasco DO;  Location: [facility] ENDOSCOPY;  Service:    • ORIF TIBIA/FIBULA FRACTURES Left    • VAGINAL MESH REVISION       Smoking status: Never Smoker                                                              Smokeless status: Never Used                          Current Outpatient Prescriptions:   •  APAP-isometheptene-dichloral -325 MG per capsule, Take 1 capsule by mouth 4 (Four) Times a Day As Needed for Headache., Disp: , Rfl:   •  atorvastatin (LIPITOR) 40 MG tablet, Take 40 mg by mouth Daily., Disp: , Rfl:    •  butalbital-aspirin-caffeine (FIORINAL) -40 MG capsule, Take  by mouth., Disp: , Rfl:   •  carbidopa-levodopa (SINEMET)  MG per tablet, Take  by mouth., Disp: , Rfl:   •  carvedilol (COREG) 6.25 MG tablet, TAKE 1 TABLET TWICE A DAY, Disp: , Rfl:   •  diltiazem XR (DILACOR XR) 240 MG 24 hr capsule, Take  by mouth., Disp: , Rfl:   •  FIBER FORMULA capsule, Take  by mouth., Disp: , Rfl:   •  irbesartan (AVAPRO) 300 MG tablet, Take  by mouth., Disp: , Rfl:   •  lansoprazole (PREVACID) 30 MG capsule, Take  by mouth., Disp: , Rfl:   •  levothyroxine (SYNTHROID, LEVOTHROID) 50 MCG tablet, 1 tablet daily, Disp: , Rfl:   •  oxyCODONE (ROXICODONE) 10 MG tablet, Take 10 mg by mouth Every 6 (Six) Hours As Needed for Moderate Pain  (GENERAL PAIN, HIPS). DR GUILHERME TENA HAS BEEN ON FOR14 YEARS, Disp: , Rfl:   •  SUMAtriptan (IMITREX) 50 MG tablet, , Disp: , Rfl:   •  vitamin D (ERGOCALCIFEROL) 70716 units capsule capsule, Take 50,000 Units by mouth 1 (One) Time Per Week., Disp: , Rfl:     Current Facility-Administered Medications:   •  sodium bicarbonate injection 2.5 mEq, 5 mL, Injection, Once, Dariela Pool MD    Allergies   Allergen Reactions   • Codeine Mental Status Change and Itching   • Ropinirole Hcl Shortness Of Breath   • Ambien [Zolpidem] Other (See Comments)     HYPER    • Eszopiclone Other (See Comments)     MAKES PT HYPER    • Pregabalin Dizziness   • Tizanidine Other (See Comments)     Terrible nightmares       Family History   Problem Relation Age of Onset   • Diabetes Mother    • Diabetes Sister    • Ovarian cancer Paternal Aunt    • Colon cancer Neg Hx    • Esophageal cancer Neg Hx      Review of Systems   Constitutional: Negative for unexpected weight change.   Eyes: Positive for visual disturbance (wears glasses).   Respiratory: Positive for shortness of breath (sometimes, with activity).    Cardiovascular: Chest pain: GERD, patient recently had endoscopy.   Gastrointestinal: Negative for  "abdominal pain and blood in stool.   Endocrine: Negative for cold intolerance and heat intolerance.   Genitourinary: Positive for enuresis, vaginal bleeding and vaginal pain (\"rough\" feeling). Negative for difficulty urinating and dysuria.   Musculoskeletal: Positive for arthralgias and back pain.   Neurological: Negative for dizziness.   Psychiatric/Behavioral: Negative for confusion.         Objective   /70  Ht 62\" (157.5 cm)  Wt 213 lb (96.6 kg)  BMI 38.96 kg/m2    Physical Exam   Physical Exam   Constitutional: She is oriented to person, place, and time. She appears well-developed and well-nourished. No distress.   HENT:   Head: Normocephalic and atraumatic.   Pulmonary/Chest: Effort normal.   Abdominal: Soft. There is no tenderness.   Genitourinary: Rectal exam shows anal tone normal. Pelvic exam was performed with patient supine. There is no tenderness or lesion on the right labia. There is no tenderness or lesion on the left labia. Uterus is not enlarged and not tender. Cervix exhibits no motion tenderness, no discharge and no friability. There is tenderness in the vagina. No bleeding in the vagina. No signs of injury around the vagina. No vaginal discharge found.   Genitourinary Comments: Tenderness on exam consistent with patient not being sexually active for some time.  Small (3-4 mm) area of exposed mesh not visible on speculum exam because it is behind a slight fold in anterior mucosa, but is palpable on digital exam.  Exposed mesh is just under symphysis on patient's L side.  No blood visible and area not consistent with history of heavy vaginal bleeding.  Pelvic u/s reveals 2.6 cm endometrial lining.   Neurological: She is alert and oriented to person, place, and time.   Psychiatric: She has a normal mood and affect. Her behavior is normal. Thought content normal.   Nursing note and vitals reviewed.      Labs  Lab Results   Component Value Date     09/06/2017    HGB 11.8 (L) 09/06/2017 "    HCT 34.8 (L) 09/06/2017    WBC 6.50 09/06/2017     09/06/2017    K 4.0 09/06/2017     09/06/2017    CO2 27.0 09/06/2017    BUN 25 (H) 09/06/2017    CREATININE 1.07 09/06/2017    GLUCOSE 115 (H) 09/06/2017    CALCIUM 9.8 09/06/2017        Assessment & Plan    Jennifer was seen today for vaginal bleeding.    Diagnoses and all orders for this visit:    Erosion of vaginal mesh, initial encounter: Patient with most of sling removed in 2013, but now has a new area of exposed mesh.  The area is just under symphysis on L, but is only 3-4 mm.  The area is not consistent with the heavy vaginal bleeding that the patient reports, and so a pelvic u/s was done while the patient was in the office.  The patient will need a hysteroscopy/D&C for thick endometrial lining, but the small area of exposed mesh will be excised at the same time.  -     Case Request; Standing  -     ECG 12 Lead; Future  -     XR Chest 1 View; Future  -     ceFAZolin (ANCEF) 2 g in sodium chloride 0.9 % 100 mL IVPB; Infuse 2 g into a venous catheter 60 Minutes Prior to Surgery.  -     Case Request    Vaginal bleeding: Patient reports to be heavy and bright red at times.  Suspect bleeding to be associated with thick endometrial lining of 2.6.  Patient to have a hysteroscopy/D&C.  The small area of exposed mesh is not consistent with heavy vaginal bleeding.    BMI 38.0-38.9,adult    Chronic back pain, unspecified back location, unspecified back pain laterality    Hypertension, essential    Pre-op evaluation  -     CBC and Differential; Future    Other orders  -     Follow Anesthesia Guidelines / Standing Orders; Future  -     Follow Anesthesia Guidelines / Standing Orders; Standing  -     Place sequential compression device; Standing  -     Place TIFFANI hose; Standing  -     Type & Screen; Standing  -     Obtain informed consent; Standing        Shasta Madrigal MD  10/22/2017  8:27 PM

## 2017-10-23 NOTE — PROCEDURES
DATE:     REASON FOR VISIT: Lumbar Degenerative Disc Disease, Lumbar Facet Syndrome  PROCEDURE: Lumbar Facet Injection. [] Moderate Sedation     DESCRIPTION OF PROCEDURE:    After obtaining informed consent, the patient was taken to the procedure room, positioned prone, and sterilely prepped. The fluoroscope was positioned and a 22-gauge needle was passed with fluoroscopic guidance to the median nerve branches of the L5-S1  lumbar facet on the [x] Left. The, 0.5ml of 0.5% Marcaine with 2mg of Kenalog was injected. This was repeated at L4-5  on the [x] Left . There were no complications. [x] The fluoroscope was repositioned to the  [x] Right side and the procedure was repeated identically at L5-S1, L4-5. There were no complications. DIAGNOSES:  [x] Lumbar Degenerative Disc Disease     [x] *Lumbar Facet Syndrome  [] Lumbar Spondylosis    [] Other       REASON FOR VISIT: Bilateral Sacroilitis   PROCEDURE:  Sacroiliac Joint Injection. [] Moderate Sedation    DESCRIPTION OF PROCEDURE:    After obtaining informed consent, the patient was taken to the procedure room, positioned prone, and sterilely prepped. The fluoroscope was positioned for guidance. Fluoroscopic imaging confirmation, of intra-articular needle positioning, was confirmed within the [x] Left [] Right sacroiliac joint. Then, 5ml of 0.5% Marcaine and 20 mg of Kenalog was gently injected. There were no complications. [x] The fluoroscope was repositioned to the [] Left [x] Right and fluoroscopic imaging confirmation, of intra-articular needle positioning, was confirmed. The 5ml of 0.5%Marcaine and 20 mg Kenalog was gently injected. There were no complications. DIAGNOSES:  [x] *Sacroiliitis  [] Left [] Right [x] Bilateral  [] Unspecified Arthropathy involving pelvic region and thigh. EXAM:   Pain is identified in SI joint area and patient states that pain radiates down her back of her leg.      COMMENTS:  Patient complains of low back pain. Patient is tearful at this appointment. Patient was recommended that she will need back surgery per Dr. Delia Tan, and she states that she is going to decline proceeding with surgery. Patient states that she was informed that surgery would not work for her. Patient has had multiple injections in the past.  Patient has had Trochanteric Bursa injections, and states that her hips no longer hurt. Patient requesting increase in Sinemet, discussed increase to taking 6 times a day. Patient concerned that she will not be able to keep up with the times for adjusting dosage to this. Discussed possible changing medication from Sinemet to Mirapex. Patient feels that her leg pain is worse when she is up moving around and will begin at night with sleep. Patient is wanting to increase dosage of her Oxycodone will increase to 15 mg. Discussed that proceeding with the Lumbar Facets will allow for the patient to identify source of pain. Discussed that if successful with 2 series of lumbar facet injections will proceed with Radiofrequency Lesioning of the Lumbar Facets that can provide 75% relief that can lasts up to 6 months. Discussed the risk and benefits of procedure with patient. Will proceed today with Lumbar Facet injections. PLAN:  [x] Will return to office in 2 month(s) for [x] Planned Procedure [] Office Visit  [] Prescriptions were given today    [] No prescriptions needed today  [] Patient is to call with any questions or concerns which may arise prior to the next office visit. [x] Lumbar Facet #2  [] Radiofrequency Lesioning of the Lumbar Facets  []  []   []       [] Over 50% of today's appointment was given to discussion, evaluation and counseling.

## 2017-10-23 NOTE — H&P
[facility]  Jennifer Gomes  : 1940  MRN: [id number]  CSN: [id number]    History and Physical    Subjective   Jennifer Gomes is a 77 y.o. year old  who presents for consultation about surgery due to vaginal bleeding.  The patient had surgery in  to remove exposed vaginal mesh from a mid-urethral sling.  She reports that for the past year she has been having vaginal bleeding again, and suspects that there is exposed mesh again.  She reports that bleeding is intermittent, but also comments that sometimes it is really heavy and bright red - like a period.    Past Medical History:   Diagnosis Date   • Anxiety    • Arthritis    • Chronic pain    • Depression    • Disease of thyroid gland    • Fibromyalgia    • Hyperlipidemia    • Hypertension    • Incontinence    • Insomnia    • Lumbar stenosis    • Peptic ulcer    • Postmenopausal bleeding    • Restless legs      Past Surgical History:   Procedure Laterality Date   • BLADDER REPAIR      MESH HAD TO BE REMOVED IN    • BREAST CYST EXCISION     • CARPAL TUNNEL RELEASE     • CATARACT EXTRACTION W/ INTRAOCULAR LENS  IMPLANT, BILATERAL     • CYSTECTOMY     • DILATION AND CURETTAGE, DIAGNOSTIC / THERAPEUTIC     • ENDOSCOPY  2010    Short segment of Arriola's,Moderate chroninc esophagogastritis and negative H.pylori   • ENDOSCOPY N/A 2017    Procedure: ESOPHAGOGASTRODUODENOSCOPY WITH ANESTHESIA;  Surgeon: Tom Velasco DO;  Location: [facility] ENDOSCOPY;  Service:    • ORIF TIBIA/FIBULA FRACTURES Left    • VAGINAL MESH REVISION       Smoking status: Never Smoker                                                              Smokeless status: Never Used                          Current Outpatient Prescriptions:   •  APAP-isometheptene-dichloral -325 MG per capsule, Take 1 capsule by mouth 4 (Four) Times a Day As Needed for Headache., Disp: , Rfl:   •  atorvastatin (LIPITOR) 40 MG tablet, Take 40 mg by mouth Daily., Disp: , Rfl:    •  butalbital-aspirin-caffeine (FIORINAL) -40 MG capsule, Take  by mouth., Disp: , Rfl:   •  carbidopa-levodopa (SINEMET)  MG per tablet, Take  by mouth., Disp: , Rfl:   •  carvedilol (COREG) 6.25 MG tablet, TAKE 1 TABLET TWICE A DAY, Disp: , Rfl:   •  diltiazem XR (DILACOR XR) 240 MG 24 hr capsule, Take  by mouth., Disp: , Rfl:   •  FIBER FORMULA capsule, Take  by mouth., Disp: , Rfl:   •  irbesartan (AVAPRO) 300 MG tablet, Take  by mouth., Disp: , Rfl:   •  lansoprazole (PREVACID) 30 MG capsule, Take  by mouth., Disp: , Rfl:   •  levothyroxine (SYNTHROID, LEVOTHROID) 50 MCG tablet, 1 tablet daily, Disp: , Rfl:   •  oxyCODONE (ROXICODONE) 10 MG tablet, Take 10 mg by mouth Every 6 (Six) Hours As Needed for Moderate Pain  (GENERAL PAIN, HIPS). DR GUILHERME TENA HAS BEEN ON FOR14 YEARS, Disp: , Rfl:   •  SUMAtriptan (IMITREX) 50 MG tablet, , Disp: , Rfl:   •  vitamin D (ERGOCALCIFEROL) 92585 units capsule capsule, Take 50,000 Units by mouth 1 (One) Time Per Week., Disp: , Rfl:     Current Facility-Administered Medications:   •  sodium bicarbonate injection 2.5 mEq, 5 mL, Injection, Once, Dariela Pool MD    Allergies   Allergen Reactions   • Codeine Mental Status Change and Itching   • Ropinirole Hcl Shortness Of Breath   • Ambien [Zolpidem] Other (See Comments)     HYPER    • Eszopiclone Other (See Comments)     MAKES PT HYPER    • Pregabalin Dizziness   • Tizanidine Other (See Comments)     Terrible nightmares       Family History   Problem Relation Age of Onset   • Diabetes Mother    • Diabetes Sister    • Ovarian cancer Paternal Aunt    • Colon cancer Neg Hx    • Esophageal cancer Neg Hx      Review of Systems   Constitutional: Negative for unexpected weight change.   Eyes: Positive for visual disturbance (wears glasses).   Respiratory: Positive for shortness of breath (sometimes, with activity).    Cardiovascular: Chest pain: GERD, patient recently had endoscopy.   Gastrointestinal: Negative for  "abdominal pain and blood in stool.   Endocrine: Negative for cold intolerance and heat intolerance.   Genitourinary: Positive for enuresis, vaginal bleeding and vaginal pain (\"rough\" feeling). Negative for difficulty urinating and dysuria.   Musculoskeletal: Positive for arthralgias and back pain.   Neurological: Negative for dizziness.   Psychiatric/Behavioral: Negative for confusion.         Objective   /70  Ht 62\" (157.5 cm)  Wt 213 lb (96.6 kg)  BMI 38.96 kg/m2    Physical Exam   Physical Exam   Constitutional: She is oriented to person, place, and time. She appears well-developed and well-nourished. No distress.   HENT:   Head: Normocephalic and atraumatic.   Pulmonary/Chest: Effort normal.   Abdominal: Soft. There is no tenderness.   Genitourinary: Rectal exam shows anal tone normal. Pelvic exam was performed with patient supine. There is no tenderness or lesion on the right labia. There is no tenderness or lesion on the left labia. Uterus is not enlarged and not tender. Cervix exhibits no motion tenderness, no discharge and no friability. There is tenderness in the vagina. No bleeding in the vagina. No signs of injury around the vagina. No vaginal discharge found.   Genitourinary Comments: Tenderness on exam consistent with patient not being sexually active for some time.  Small (3-4 mm) area of exposed mesh not visible on speculum exam because it is behind a slight fold in anterior mucosa, but is palpable on digital exam.  Exposed mesh is just under symphysis on patient's L side.  No blood visible and area not consistent with history of heavy vaginal bleeding.  Pelvic u/s reveals 2.6 cm endometrial lining.   Neurological: She is alert and oriented to person, place, and time.   Psychiatric: She has a normal mood and affect. Her behavior is normal. Thought content normal.   Nursing note and vitals reviewed.      Labs  Lab Results   Component Value Date     09/06/2017    HGB 11.8 (L) 09/06/2017 "    HCT 34.8 (L) 09/06/2017    WBC 6.50 09/06/2017     09/06/2017    K 4.0 09/06/2017     09/06/2017    CO2 27.0 09/06/2017    BUN 25 (H) 09/06/2017    CREATININE 1.07 09/06/2017    GLUCOSE 115 (H) 09/06/2017    CALCIUM 9.8 09/06/2017        Assessment & Plan    Jennifer was seen today for vaginal bleeding.    Diagnoses and all orders for this visit:    Erosion of vaginal mesh, initial encounter: Patient with most of sling removed in 2013, but now has a new area of exposed mesh.  The area is just under symphysis on L, but is only 3-4 mm.  The area is not consistent with the heavy vaginal bleeding that the patient reports, and so a pelvic u/s was done while the patient was in the office.  The patient will need a hysteroscopy/D&C for thick endometrial lining, but the small area of exposed mesh will be excised at the same time.  -     Case Request; Standing  -     ECG 12 Lead; Future  -     XR Chest 1 View; Future  -     ceFAZolin (ANCEF) 2 g in sodium chloride 0.9 % 100 mL IVPB; Infuse 2 g into a venous catheter 60 Minutes Prior to Surgery.  -     Case Request    Vaginal bleeding: Patient reports to be heavy and bright red at times.  Suspect bleeding to be associated with thick endometrial lining of 2.6.  Patient to have a hysteroscopy/D&C.  The small area of exposed mesh is not consistent with heavy vaginal bleeding.    BMI 38.0-38.9,adult    Chronic back pain, unspecified back location, unspecified back pain laterality    Hypertension, essential    Pre-op evaluation  -     CBC and Differential; Future    Other orders  -     Follow Anesthesia Guidelines / Standing Orders; Future  -     Follow Anesthesia Guidelines / Standing Orders; Standing  -     Place sequential compression device; Standing  -     Place TIFFANI hose; Standing  -     Type & Screen; Standing  -     Obtain informed consent; Standing        Shasta Madrigal MD  10/22/2017  8:27 PM

## 2017-10-23 NOTE — PROGRESS NOTES
Nursing Admission Record    Current Issues / Falls / ER Visits:  No    Percentage of Pain Relief after Last Procedure:  75 %    How long lasted:  2.5 months    Radiology exams received during the last 12 months: No       When                                               Where       Imaging on chart: No         Imaging records requested: No  MRI exams received in the past 2 years:  No  Physical therapy during the last 6 months: No       When:                                              Where   Labs during the last 12 months: Yes    Education Provided:  [] Review of Nikkie Galvez  [] Agreement Review  [] Compliance Issues Discussed    [] Cognitive Behavior Needs [] Exercise [] Review of Test [] Financial Issues  [] Tobacco/Alcohol Use [] Teaching [] New Patient [] Picture Obtained    Physician Plan:  [] Outgoing Referral  [] Pharmacy Consult  [] Test Ordered   [] Obtained Test Results / Consult Notes  [] UDS due at next visit, verified per EPIC      [] Suspected Physical Abuse or Suicide Risk assessed - IF YES COMPLETE QUESTIONS BELOW    If any of the following questions are answered yes - contact attending physician for referral:    Has been considering harming self to escape stress, pain problems? [] YES  [] NO  Has a suicide plan? [] YES  [] NO  Has attempted suicide in the past?   [] YES  [] NO  Has a close friend or family member who committed suicide? [] YES  [] NO    Patient Referred To :      Additional Notes:    Assessment Completed by:  Electronically signed by Sierra Mckeon RN on 10/23/2017 at 2:32 PM

## 2017-10-23 NOTE — DISCHARGE INSTRUCTIONS
DAY OF SURGERY INSTRUCTIONS    Vaginal Mesh Revision    Shasta Madrigal MD    Admission Date:   11/6/2017      ARRIVAL TIME: AS DIRECTED BY OFFICE    DAY OF SURGERY TAKE ONLY THESE MEDICATIONS: COREG            BEFORE YOU COME TO THE HOSPITAL  (Pre-op instructions)  • Do not eat, drink, smoke or chew gum after midnight the night before surgery.  This also includes no mints.  • Morning of surgery take only the medicines you have been instructed with a sip of water unless otherwise instructed  by your physician.  • Do not shave, wear makeup or dark nail polish.  • Remove all jewelry including rings.  • Leave anything you consider valuable at home.  • Leave your suitcase in the car until after your surgery.  • Bring the following with you if applicable:  o Picture ID and insurance, Medicare or Medicaid cards  o Co-pay/deductible required by insurance (cash, check, credit card)  o Copy of advance directive, living will or power-of- documents if not brought to PAT  o CPAP or BIPAP mask and tubing  o Relaxation aids (MP3 player, book, magazine)  • On the day of surgery check in at registration located at the main entrance of the hospital.       Outpatient Surgery Guidelines, Adult  Outpatient procedures are those for which the person having the procedure is allowed to go home the same day as the procedure. Various procedures are done on an outpatient basis. You should follow some general guidelines if you will be having an outpatient procedure.  LET YOUR HEALTH CARE PROVIDER KNOW ABOUT:  · Any allergies you have.  · All medicines you are taking, including vitamins, herbs, eye drops, creams, and over-the-counter medicines.  · Previous problems you or members of your family have had with the use of anesthetics.  · Any blood disorders you have.  · Previous surgeries you have had.  · Medical conditions you have.  RISKS AND COMPLICATIONS  Your health care provider will discuss possible risks and complications with you  before surgery. Common risks and complications include:    · Problems due to the use of anesthetics.  · Blood loss and replacement (does not apply to minor surgical procedures).  · Temporary increase in pain due to surgery.  · Uncorrected pain or problems that the surgery was meant to correct.  · Infection.  · New damage.  BEFORE THE PROCEDURE  · Ask your health care provider about changing or stopping your regular medicines. You may need to stop taking certain medicines in the days or weeks before the procedure.  · Stop smoking at least 2 weeks before surgery. This lowers your risk for complications during and after surgery. Ask your health care provider for help with this if needed.  · Eat your usual meals and a light supper the day before surgery. Continue fluid intake. Do not drink alcohol.  · Do not eat or drink after midnight the night before your surgery.   · Arrange for someone to take you home and to stay with you for 24 hours after the procedure. Medicine given for your procedure may affect your ability to drive or to care for yourself.  · Call your health care provider's office if you develop an illness or problem that may prevent you from safely having your procedure.  AFTER THE PROCEDURE  After surgery, you will be taken to a recovery area, where your progress will be monitored. If there are no complications, you will be allowed to go home when you are awake, stable, and taking fluids well. You may have numbness around the surgical site. Healing will take some time. You will have tenderness at the surgical site and may have some swelling and bruising. You may also have some nausea.  HOME CARE INSTRUCTIONS  · Do not drive for 24 hours, or as directed by your health care provider. Do not drive while taking prescription pain medicines.  · Do not drink alcohol for 24 hours.  · Do not make important decisions or sign legal documents for 24 hours.  · You may resume a normal diet and activities as  directed.  · Do not lift anything heavier than 10 pounds (4.5 kg) or play contact sports until your health care provider says it is okay.  · Change your bandages (dressings) as directed.  · Only take over-the-counter or prescription medicines as directed by your health care provider.  · Follow up with your health care provider as directed.  SEEK MEDICAL CARE IF:  · You have increased bleeding (more than a small spot) from the surgical site.  · You have redness, swelling, or increasing pain in the wound.  · You see pus coming from the wound.  · You have a fever.  · You notice a bad smell coming from the wound or dressing.  · You feel lightheaded or faint.  · You develop a rash.  · You have trouble breathing.  · You develop allergies.  MAKE SURE YOU:  · Understand these instructions.  · Will watch your condition.  · Will get help right away if you are not doing well or get worse.     This information is not intended to replace advice given to you by your health care provider. Make sure you discuss any questions you have with your health care provider.     Document Released: 09/12/2002 Document Revised: 05/03/2016 Document Reviewed: 05/22/2014  [employer] Interactive Patient Education ©2016 [employer]       Fall Prevention in Hospitals, Adult  As a hospital patient, your condition and the treatments you receive can increase your risk for falls. Some additional risk factors for falls in a hospital include:  · Being in an unfamiliar environment.  · Being on bed rest.  · Your surgery.  · Taking certain medicines.  · Your tubing requirements, such as intravenous (IV) therapy or catheters.  It is important that you learn how to decrease fall risks while at the hospital. Below are important tips that can help prevent falls.  SAFETY TIPS FOR PREVENTING FALLS  Talk about your risk of falling.  · Ask your health care provider why you are at risk for falling. Is it your medicine, illness, tubing placement, or something  else?  · Make a plan with your health care provider to keep you safe from falls.  · Ask your health care provider or pharmacist about side effects of your medicines. Some medicines can make you dizzy or affect your coordination.  Ask for help.  · Ask for help before getting out of bed. You may need to press your call button.  · Ask for assistance in getting safely to the toilet.  · Ask for a walker or cane to be put at your bedside. Ask that most of the side rails on your bed be placed up before your health care provider leaves the room.  · Ask family or friends to sit with you.  · Ask for things that are out of your reach, such as your glasses, hearing aids, telephone, bedside table, or call button.  Follow these tips to avoid falling:  · Stay lying or seated, rather than standing, while waiting for help.  · Wear rubber-soled slippers or shoes whenever you walk in the hospital.  · Avoid quick, sudden movements.  ¨ Change positions slowly.  ¨ Sit on the side of your bed before standing.  ¨ Stand up slowly and wait before you start to walk.  · Let your health care provider know if there is a spill on the floor.  · Pay careful attention to the medical equipment, electrical cords, and tubes around you.  · When you need help, use your call button by your bed or in the bathroom. Wait for one of your health care providers to help you.  · If you feel dizzy or unsure of your footing, return to bed and wait for assistance.  · Avoid being distracted by the TV, telephone, or another person in your room.  · Do not lean or support yourself on rolling objects, such as IV poles or bedside tables.     This information is not intended to replace advice given to you by your health care provider. Make sure you discuss any questions you have with your health care provider.     Document Released: 12/15/2001 Document Revised: 01/08/2016 Document Reviewed: 08/25/2013  [employer]vier Interactive Patient Education ©2016 [employer]       Surgical Site Infections FAQs  What is a Surgical Site Infection (SSI)?  A surgical site infection is an infection that occurs after surgery in the part of the body where the surgery took place. Most patients who have surgery do not develop an infection. However, infections develop in about 1 to 3 out of every 100 patients who have surgery.  Some of the common symptoms of a surgical site infection are:  · Redness and pain around the area where you had surgery  · Drainage of cloudy fluid from your surgical wound  · Fever  Can SSIs be treated?  Yes. Most surgical site infections can be treated with antibiotics. The antibiotic given to you depends on the bacteria (germs) causing the infection. Sometimes patients with SSIs also need another surgery to treat the infection.  What are some of the things that hospitals are doing to prevent SSIs?  To prevent SSIs, doctors, nurses, and other healthcare providers:  · Clean their hands and arms up to their elbows with an antiseptic agent just before the surgery.  · Clean their hands with soap and water or an alcohol-based hand rub before and after caring for each patient.  · May remove some of your hair immediately before your surgery using electric clippers if the hair is in the same area where the procedure will occur. They should not shave you with a razor.  · Wear special hair covers, masks, gowns, and gloves during surgery to keep the surgery area clean.  · Give you antibiotics before your surgery starts. In most cases, you should get antibiotics within 60 minutes before the surgery starts and the antibiotics should be stopped within 24 hours after surgery.  · Clean the skin at the site of your surgery with a special soap that kills germs.  What can I do to help prevent SSIs?  Before your surgery:  · Tell your doctor about other medical problems you may have. Health problems such as allergies, diabetes, and obesity could affect your surgery and your  treatment.  · Quit smoking. Patients who smoke get more infections. Talk to your doctor about how you can quit before your surgery.  · Do not shave near where you will have surgery. Shaving with a razor can irritate your skin and make it easier to develop an infection.  At the time of your surgery:  · Speak up if someone tries to shave you with a razor before surgery. Ask why you need to be shaved and talk with your surgeon if you have any concerns.  · Ask if you will get antibiotics before surgery.  After your surgery:  · Make sure that your healthcare providers clean their hands before examining you, either with soap and water or an alcohol-based hand rub.  · If you do not see your providers clean their hands, please ask them to do so.  · Family and friends who visit you should not touch the surgical wound or dressings.  · Family and friends should clean their hands with soap and water or an alcohol-based hand rub before and after visiting you. If you do not see them clean their hands, ask them to clean their hands.  What do I need to do when I go home from the hospital?  · Before you go home, your doctor or nurse should explain everything you need to know about taking care of your wound. Make sure you understand how to care for your wound before you leave the hospital.  · Always clean your hands before and after caring for your wound.  · Before you go home, make sure you know who to contact if you have questions or problems after you get home.  · If you have any symptoms of an infection, such as redness and pain at the surgery site, drainage, or fever, call your doctor immediately.  If you have additional questions, please ask your doctor or nurse.  Developed and co-sponsored by The Society for Healthcare Epidemiology of [place] (SHEA); Infectious Diseases Society of [place] (IDSA); American Hospital Association; Association for Professionals in Infection Control and Epidemiology (APIC); Centers for Disease  Control and Prevention (CDC); and The Joint Commission.     This information is not intended to replace advice given to you by your health care provider. Make sure you discuss any questions you have with your health care provider.     Document Released: 12/23/2014 Document Revised: 01/08/2016 Document Reviewed: 03/02/2016  [employer] Interactive Patient Education ©2016 [employer]     PATIENT/FAMILY/RESPONSIBLE PARTY VERBALIZES UNDERSTANDING OF ABOVE EDUCATION.  COPY OF PAIN SCALE GIVEN AND REVIEWED WITH VERBALIZED UNDERSTANDING.

## 2017-10-24 ENCOUNTER — RESULTS ENCOUNTER (OUTPATIENT)
Dept: OBSTETRICS AND GYNECOLOGY | Facility: CLINIC | Age: 77
End: 2017-10-24

## 2017-10-24 ENCOUNTER — TELEPHONE (OUTPATIENT)
Dept: OBSTETRICS AND GYNECOLOGY | Facility: CLINIC | Age: 77
End: 2017-10-24

## 2017-10-24 DIAGNOSIS — Z01.818 PRE-OP EVALUATION: ICD-10-CM

## 2017-10-24 NOTE — TELEPHONE ENCOUNTER
Called pt to inform her of surgery scheduled for 11-6-17 @ 11:00am. Pt to arrive at 9:00am. Pt also was scheduled for preop labs on 10-23-17 @ 9:45am. That testing has already been done. Sending copy of pt surgery instructions to her in the mail. Pt understood. BS

## 2017-10-31 ENCOUNTER — TELEPHONE (OUTPATIENT)
Dept: OBSTETRICS AND GYNECOLOGY | Facility: CLINIC | Age: 77
End: 2017-10-31

## 2017-10-31 NOTE — TELEPHONE ENCOUNTER
Pt notified of surgery time change. Pt is to arrive at 11:00am for a 1:00pm surgery on 11-6-17. Pt understood. BS

## 2017-11-01 LAB
AMOBARBITAL, URINE: NEGATIVE
AMPHETAMINES, URINE: NEGATIVE NG/ML
BARBITURATES, URINE: ABNORMAL NG/ML
BARBITURATES, URINE: POSITIVE
BENZODIAZEPINES, URINE: NEGATIVE NG/ML
BUTALBITAL, GC/MS URINE: 2685 NG/ML
BUTALBITAL, URINE: POSITIVE
CANNABINOIDS, URINE: NEGATIVE NG/ML
COCAINE METABOLITE, URINE: NEGATIVE NG/ML
CREATININE, URINE: 145.1 MG/DL (ref 20–300)
ETHANOL U, QUAN: NEGATIVE %
FENTANYL URINE: NEGATIVE PG/ML
MEPERIDINE, UR: NEGATIVE NG/ML
METHADONE SCREEN, URINE: NEGATIVE NG/ML
OPIATES, URINE: ABNORMAL NG/ML
OPIATES, URINE: NEGATIVE NG/ML
OXYCODONE URINE: POSITIVE
OXYCODONE, UR GC/MS: >3000 NG/ML
OXYCODONE/OXYMORPH, UR: POSITIVE
OXYCODONE/OXYMORPHONE, UR: ABNORMAL NG/ML
OXYMORPHONE, UR GC/MS: 692 NG/ML
OXYMORPHONE, URINE: POSITIVE
PENTOBARBITAL, UR: NEGATIVE
PH, URINE: 5.9 (ref 4.5–8.9)
PHENCYCLIDINE, URINE: NEGATIVE NG/ML
PHENOBARBITAL, UR: NEGATIVE
PROPOXYPHENE, URINE: NEGATIVE NG/ML
SECOBARBITAL, UR: NEGATIVE

## 2017-11-06 ENCOUNTER — HOSPITAL ENCOUNTER (OUTPATIENT)
Facility: HOSPITAL | Age: 77
Setting detail: HOSPITAL OUTPATIENT SURGERY
Discharge: HOME | End: 2017-11-06
Attending: OBSTETRICS & GYNECOLOGY | Admitting: OBSTETRICS & GYNECOLOGY

## 2017-11-06 ENCOUNTER — ANESTHESIA (OUTPATIENT)
Dept: PERIOP | Facility: HOSPITAL | Age: 77
End: 2017-11-06

## 2017-11-06 ENCOUNTER — ANESTHESIA EVENT (OUTPATIENT)
Dept: PERIOP | Facility: HOSPITAL | Age: 77
End: 2017-11-06

## 2017-11-06 VITALS
DIASTOLIC BLOOD PRESSURE: 48 MMHG | OXYGEN SATURATION: 91 % | HEART RATE: 82 BPM | RESPIRATION RATE: 14 BRPM | TEMPERATURE: 97.4 F | SYSTOLIC BLOOD PRESSURE: 159 MMHG

## 2017-11-06 DIAGNOSIS — T83.711D EROSION OF VAGINAL MESH, SUBSEQUENT ENCOUNTER: Primary | ICD-10-CM

## 2017-11-06 DIAGNOSIS — T83.711A EROSION OF VAGINAL MESH, INITIAL ENCOUNTER (HCC): ICD-10-CM

## 2017-11-06 LAB
ABO GROUP BLD: NORMAL
BLD GP AB SCN SERPL QL: NEGATIVE
RH BLD: POSITIVE

## 2017-11-06 PROCEDURE — 25010000002 DEXAMETHASONE PER 1 MG: Performed by: ANESTHESIOLOGY

## 2017-11-06 PROCEDURE — 86900 BLOOD TYPING SEROLOGIC ABO: CPT | Performed by: OBSTETRICS & GYNECOLOGY

## 2017-11-06 PROCEDURE — 25010000003 CEFAZOLIN PER 500 MG: Performed by: OBSTETRICS & GYNECOLOGY

## 2017-11-06 PROCEDURE — 86901 BLOOD TYPING SEROLOGIC RH(D): CPT | Performed by: OBSTETRICS & GYNECOLOGY

## 2017-11-06 PROCEDURE — 25010000002 FENTANYL CITRATE (PF) 100 MCG/2ML SOLUTION: Performed by: NURSE ANESTHETIST, CERTIFIED REGISTERED

## 2017-11-06 PROCEDURE — 57295 REVISE VAG GRAFT VIA VAGINA: CPT | Performed by: OBSTETRICS & GYNECOLOGY

## 2017-11-06 PROCEDURE — 86850 RBC ANTIBODY SCREEN: CPT | Performed by: OBSTETRICS & GYNECOLOGY

## 2017-11-06 PROCEDURE — 25010000002 ONDANSETRON PER 1 MG: Performed by: NURSE ANESTHETIST, CERTIFIED REGISTERED

## 2017-11-06 PROCEDURE — 88305 TISSUE EXAM BY PATHOLOGIST: CPT | Performed by: OBSTETRICS & GYNECOLOGY

## 2017-11-06 PROCEDURE — 58558 HYSTEROSCOPY BIOPSY: CPT | Performed by: OBSTETRICS & GYNECOLOGY

## 2017-11-06 PROCEDURE — 25010000002 PROPOFOL 10 MG/ML EMULSION: Performed by: NURSE ANESTHETIST, CERTIFIED REGISTERED

## 2017-11-06 RX ORDER — IPRATROPIUM BROMIDE AND ALBUTEROL SULFATE 2.5; .5 MG/3ML; MG/3ML
3 SOLUTION RESPIRATORY (INHALATION) ONCE AS NEEDED
Status: DISCONTINUED | OUTPATIENT
Start: 2017-11-06 | End: 2017-11-06 | Stop reason: HOSPADM

## 2017-11-06 RX ORDER — HYDRALAZINE HYDROCHLORIDE 20 MG/ML
5 INJECTION INTRAMUSCULAR; INTRAVENOUS
Status: DISCONTINUED | OUTPATIENT
Start: 2017-11-06 | End: 2017-11-06 | Stop reason: HOSPADM

## 2017-11-06 RX ORDER — PROPOFOL 10 MG/ML
VIAL (ML) INTRAVENOUS AS NEEDED
Status: DISCONTINUED | OUTPATIENT
Start: 2017-11-06 | End: 2017-11-06 | Stop reason: SURG

## 2017-11-06 RX ORDER — SODIUM CHLORIDE 0.9 % (FLUSH) 0.9 %
1-10 SYRINGE (ML) INJECTION AS NEEDED
Status: DISCONTINUED | OUTPATIENT
Start: 2017-11-06 | End: 2017-11-06 | Stop reason: HOSPADM

## 2017-11-06 RX ORDER — FLUMAZENIL 0.1 MG/ML
0.2 INJECTION INTRAVENOUS AS NEEDED
Status: DISCONTINUED | OUTPATIENT
Start: 2017-11-06 | End: 2017-11-06 | Stop reason: HOSPADM

## 2017-11-06 RX ORDER — LIDOCAINE HYDROCHLORIDE 20 MG/ML
INJECTION, SOLUTION INFILTRATION; PERINEURAL AS NEEDED
Status: DISCONTINUED | OUTPATIENT
Start: 2017-11-06 | End: 2017-11-06 | Stop reason: SURG

## 2017-11-06 RX ORDER — SODIUM CHLORIDE 9 MG/ML
INJECTION, SOLUTION INTRAVENOUS AS NEEDED
Status: DISCONTINUED | OUTPATIENT
Start: 2017-11-06 | End: 2017-11-06 | Stop reason: HOSPADM

## 2017-11-06 RX ORDER — NALOXONE HCL 0.4 MG/ML
0.04 VIAL (ML) INJECTION AS NEEDED
Status: DISCONTINUED | OUTPATIENT
Start: 2017-11-06 | End: 2017-11-06 | Stop reason: HOSPADM

## 2017-11-06 RX ORDER — ONDANSETRON 2 MG/ML
4 INJECTION INTRAMUSCULAR; INTRAVENOUS AS NEEDED
Status: DISCONTINUED | OUTPATIENT
Start: 2017-11-06 | End: 2017-11-06 | Stop reason: HOSPADM

## 2017-11-06 RX ORDER — FENTANYL CITRATE 50 UG/ML
25 INJECTION, SOLUTION INTRAMUSCULAR; INTRAVENOUS
Status: DISCONTINUED | OUTPATIENT
Start: 2017-11-06 | End: 2017-11-06 | Stop reason: HOSPADM

## 2017-11-06 RX ORDER — LIDOCAINE HYDROCHLORIDE AND EPINEPHRINE 10; 10 MG/ML; UG/ML
INJECTION, SOLUTION INFILTRATION; PERINEURAL AS NEEDED
Status: DISCONTINUED | OUTPATIENT
Start: 2017-11-06 | End: 2017-11-06 | Stop reason: HOSPADM

## 2017-11-06 RX ORDER — SODIUM CHLORIDE, SODIUM LACTATE, POTASSIUM CHLORIDE, CALCIUM CHLORIDE 600; 310; 30; 20 MG/100ML; MG/100ML; MG/100ML; MG/100ML
100 INJECTION, SOLUTION INTRAVENOUS CONTINUOUS
Status: DISCONTINUED | OUTPATIENT
Start: 2017-11-06 | End: 2017-11-06 | Stop reason: HOSPADM

## 2017-11-06 RX ORDER — OXYCODONE HYDROCHLORIDE AND ACETAMINOPHEN 5; 325 MG/1; MG/1
1-2 TABLET ORAL EVERY 4 HOURS PRN
Qty: 20 TABLET | Refills: 0 | Status: SHIPPED | OUTPATIENT
Start: 2017-11-06 | End: 2017-11-14

## 2017-11-06 RX ORDER — MORPHINE SULFATE 2 MG/ML
2 INJECTION, SOLUTION INTRAMUSCULAR; INTRAVENOUS AS NEEDED
Status: DISCONTINUED | OUTPATIENT
Start: 2017-11-06 | End: 2017-11-06 | Stop reason: HOSPADM

## 2017-11-06 RX ORDER — FENTANYL CITRATE 50 UG/ML
INJECTION, SOLUTION INTRAMUSCULAR; INTRAVENOUS AS NEEDED
Status: DISCONTINUED | OUTPATIENT
Start: 2017-11-06 | End: 2017-11-06 | Stop reason: SURG

## 2017-11-06 RX ORDER — DEXAMETHASONE SODIUM PHOSPHATE 4 MG/ML
4 INJECTION, SOLUTION INTRA-ARTICULAR; INTRALESIONAL; INTRAMUSCULAR; INTRAVENOUS; SOFT TISSUE ONCE AS NEEDED
Status: COMPLETED | OUTPATIENT
Start: 2017-11-06 | End: 2017-11-06

## 2017-11-06 RX ORDER — MEPERIDINE HYDROCHLORIDE 25 MG/ML
12.5 INJECTION INTRAMUSCULAR; INTRAVENOUS; SUBCUTANEOUS
Status: DISCONTINUED | OUTPATIENT
Start: 2017-11-06 | End: 2017-11-06 | Stop reason: HOSPADM

## 2017-11-06 RX ORDER — SODIUM CHLORIDE 0.9 % (FLUSH) 0.9 %
3 SYRINGE (ML) INJECTION AS NEEDED
Status: DISCONTINUED | OUTPATIENT
Start: 2017-11-06 | End: 2017-11-06 | Stop reason: HOSPADM

## 2017-11-06 RX ORDER — OXYCODONE HYDROCHLORIDE AND ACETAMINOPHEN 5; 325 MG/1; MG/1
1 TABLET ORAL EVERY 4 HOURS PRN
Status: DISCONTINUED | OUTPATIENT
Start: 2017-11-06 | End: 2017-11-06 | Stop reason: HOSPADM

## 2017-11-06 RX ORDER — SODIUM CHLORIDE, SODIUM LACTATE, POTASSIUM CHLORIDE, CALCIUM CHLORIDE 600; 310; 30; 20 MG/100ML; MG/100ML; MG/100ML; MG/100ML
1000 INJECTION, SOLUTION INTRAVENOUS CONTINUOUS
Status: DISCONTINUED | OUTPATIENT
Start: 2017-11-06 | End: 2017-11-06 | Stop reason: HOSPADM

## 2017-11-06 RX ORDER — ONDANSETRON 2 MG/ML
INJECTION INTRAMUSCULAR; INTRAVENOUS AS NEEDED
Status: DISCONTINUED | OUTPATIENT
Start: 2017-11-06 | End: 2017-11-06 | Stop reason: SURG

## 2017-11-06 RX ORDER — METOCLOPRAMIDE HYDROCHLORIDE 5 MG/ML
5 INJECTION INTRAMUSCULAR; INTRAVENOUS
Status: DISCONTINUED | OUTPATIENT
Start: 2017-11-06 | End: 2017-11-06 | Stop reason: HOSPADM

## 2017-11-06 RX ORDER — LABETALOL HYDROCHLORIDE 5 MG/ML
5 INJECTION, SOLUTION INTRAVENOUS
Status: DISCONTINUED | OUTPATIENT
Start: 2017-11-06 | End: 2017-11-06 | Stop reason: HOSPADM

## 2017-11-06 RX ORDER — MAGNESIUM HYDROXIDE 1200 MG/15ML
LIQUID ORAL AS NEEDED
Status: DISCONTINUED | OUTPATIENT
Start: 2017-11-06 | End: 2017-11-06 | Stop reason: HOSPADM

## 2017-11-06 RX ADMIN — PROPOFOL 30 MG: 10 INJECTION, EMULSION INTRAVENOUS at 13:37

## 2017-11-06 RX ADMIN — DEXAMETHASONE SODIUM PHOSPHATE 4 MG: 4 INJECTION, SOLUTION INTRAMUSCULAR; INTRAVENOUS at 13:05

## 2017-11-06 RX ADMIN — LIDOCAINE HYDROCHLORIDE 0.5 ML: 10 INJECTION, SOLUTION EPIDURAL; INFILTRATION; INTRACAUDAL; PERINEURAL at 11:20

## 2017-11-06 RX ADMIN — CEFAZOLIN SODIUM 2 G: 2 SOLUTION INTRAVENOUS at 13:33

## 2017-11-06 RX ADMIN — FENTANYL CITRATE 50 MCG: 50 INJECTION, SOLUTION INTRAMUSCULAR; INTRAVENOUS at 13:31

## 2017-11-06 RX ADMIN — PROPOFOL 30 MG: 10 INJECTION, EMULSION INTRAVENOUS at 13:34

## 2017-11-06 RX ADMIN — ONDANSETRON HYDROCHLORIDE 4 MG: 2 SOLUTION INTRAMUSCULAR; INTRAVENOUS at 13:52

## 2017-11-06 RX ADMIN — FENTANYL CITRATE 25 MCG: 50 INJECTION, SOLUTION INTRAMUSCULAR; INTRAVENOUS at 14:10

## 2017-11-06 RX ADMIN — PROPOFOL 20 MG: 10 INJECTION, EMULSION INTRAVENOUS at 13:41

## 2017-11-06 RX ADMIN — FENTANYL CITRATE 25 MCG: 50 INJECTION, SOLUTION INTRAMUSCULAR; INTRAVENOUS at 13:45

## 2017-11-06 RX ADMIN — PROPOFOL 200 MG: 10 INJECTION, EMULSION INTRAVENOUS at 13:31

## 2017-11-06 RX ADMIN — FENTANYL CITRATE 25 MCG: 50 INJECTION, SOLUTION INTRAMUSCULAR; INTRAVENOUS at 13:41

## 2017-11-06 RX ADMIN — SODIUM CHLORIDE, POTASSIUM CHLORIDE, SODIUM LACTATE AND CALCIUM CHLORIDE 1000 ML: 600; 310; 30; 20 INJECTION, SOLUTION INTRAVENOUS at 11:20

## 2017-11-06 RX ADMIN — FENTANYL CITRATE 25 MCG: 50 INJECTION, SOLUTION INTRAMUSCULAR; INTRAVENOUS at 14:21

## 2017-11-06 RX ADMIN — LIDOCAINE HYDROCHLORIDE 50 MG: 20 INJECTION, SOLUTION INFILTRATION; PERINEURAL at 13:31

## 2017-11-06 NOTE — ANESTHESIA PROCEDURE NOTES
Airway  Urgency: elective      General Information and Staff    Patient location during procedure: OR  CRNA: IVIS SCHMITT    Indications and Patient Condition  Indications for airway management: airway protection    Preoxygenated: yes  Mask difficulty assessment: 0 - not attempted    Final Airway Details  Final airway type: supraglottic airway      Successful airway: I-gel  Size 4    Number of attempts at approach: 1

## 2017-11-06 NOTE — PLAN OF CARE
Problem: Patient Care Overview (Adult)  Goal: Plan of Care Review  Outcome: Ongoing (interventions implemented as appropriate)    Problem: Perioperative Period (Adult)  Intervention: Monitor/Manage Pain    11/06/17 1425   Manage Acute Burn Pain   Pain Management Interventions unnecessary movement minimized;relaxation techniques promoted         Goal: Signs and Symptoms of Listed Potential Problems Will be Absent or Manageable (Perioperative Period)  Outcome: Ongoing (interventions implemented as appropriate)

## 2017-11-06 NOTE — PLAN OF CARE
Problem: Perioperative Period (Adult)  Goal: Signs and Symptoms of Listed Potential Problems Will be Absent or Manageable (Perioperative Period)  Outcome: Ongoing (interventions implemented as appropriate)    11/06/17 1251   Perioperative Period   Problems Assessed (Perioperative Period) pain;hypothermia;hypoxia/hypoxemia;perioperative injury;situational response   Problems Present (Perioperative Period) situational response

## 2017-11-06 NOTE — PLAN OF CARE
Problem: Patient Care Overview (Adult)  Goal: Plan of Care Review  Outcome: Outcome(s) achieved Date Met:  11/06/17 11/06/17 1532   Coping/Psychosocial Response Interventions   Plan Of Care Reviewed With patient;spouse   Patient Care Overview   Progress improving   Outcome Evaluation   Outcome Summary/Follow up Plan Patient meets discharge criteria. Complained of constant pain in lumbar spine that she sees pain management for. Patient and spouse verbalize understanding of discharge instructions.          Problem: Perioperative Period (Adult)  Goal: Signs and Symptoms of Listed Potential Problems Will be Absent or Manageable (Perioperative Period)  Outcome: Outcome(s) achieved Date Met:  11/06/17

## 2017-11-06 NOTE — OP NOTE
"[facility]  Jennifer Gomes  1940  [id number]  [id number]  11/6/2017      Pre-operative Diagnosis: Postmenopausal Vaginal Bleeding and Erosion of vaginal mesh    Post-operative Diagnosis: same    Operation: Diagnostic Hysterscopy, Endometrial Currettage and excision of exposed mesh    Surgeon: Shasta Madrigal MD, FACOG    Assistants: OR staff    Anesthesia: General endotracheal anesthesia    Findings: Minimal uterine descent.  Endometrial cavity full of proliferative tissue.  Small area of exposed mesh on anterior L vaginal wall    Estimated Blood Loss: 50 ml    Specimens: Endometrial currettings    Complications:  None    Disposition: PACU - hemodynamically stable.      Procedure Details      After consents were obtained the patient was taken to the operating room, where general anesthesia was administered. She was placed in dorsal lithotomy position, with care taken in placement of legs to avoid nerve injury. She is prepped and draped in the usual sterile fashion.  An In-and-out catheter was performed for bladder for decompression.  Menendez retractors were used to investigate the vaginal mucosa and find the area of \"rough\" texture that was presumed to represent a small remnant of mid-urethral sling.  She is s/p excision of mid-urethral sling several years ago, not long after it was placed.   The rough piece of mucosa was excised and 2-0 chromic used to reapproximate the defect.  The removed piece of tissue measured 3 X 5 mm.    The cervix was identified and grasped with a single-tooth tenaculum. The cx was dilated using serial Carmen dilators, until a hysteroscope could be introduced.   Endocervical curettage was not performed separately since tissue was already extruding though the os.   The above findings were noted.  The uterus was grossly normal in size and contour, but the inside of the cavity was difficult to visualize because the cavity was to full of proliferative material.   Sharp curettage was " then performed until a good uterine cry was noted throughout.     The patient tolerated the procedure well. Instrument counts are correct. She was extubated, awakened, and taken to recovery room in stable condition, with instructions for discharge and a script for pain medication.       Shasta Madrigal MD  11/6/2017  2:22 PM

## 2017-11-06 NOTE — ANESTHESIA POSTPROCEDURE EVALUATION
Patient: Jennifer Gomes    Procedure Summary     Date Anesthesia Start Anesthesia Stop Room / Location    11/06/17 1324 1425 [facility] OR 14 / [facility]       Procedure Diagnosis Surgeon Provider    VAGINAL MESH REVISION (N/A Vagina); DILATATION AND CURETTAGE HYSTEROSCOPY (N/A Uterus) Erosion of vaginal mesh, initial encounter  (Erosion of vaginal mesh, initial encounter [T83.711A]) MD Kenzie Tyler CRNA          Anesthesia Type: general  Last vitals  BP   159/48 (11/06/17 1518)   Temp   97.4 °F (36.3 °C) (11/06/17 1439)   Pulse   82 (11/06/17 1518)   Resp   14 (11/06/17 1518)     SpO2   91 % (11/06/17 1518)     Post Anesthesia Care and Evaluation    Patient location during evaluation: PACU  Patient participation: complete - patient participated  Level of consciousness: awake and alert  Pain management: adequate  Airway patency: patent  Anesthetic complications: No anesthetic complications  PONV Status: none  Cardiovascular status: acceptable and hemodynamically stable  Respiratory status: acceptable  Hydration status: acceptable    Comments: Blood pressure 159/48, pulse 82, temperature 97.4 °F (36.3 °C), temperature source Temporal Artery , resp. rate 14, SpO2 91 %.    Patient discharged from PACU based upon Pavel score. Please see RN notes for further details

## 2017-11-06 NOTE — PLAN OF CARE
Problem: Patient Care Overview (Adult)  Goal: Plan of Care Review  Outcome: Ongoing (interventions implemented as appropriate)    11/06/17 1321   Coping/Psychosocial Response Interventions   Plan Of Care Reviewed With patient   Patient Care Overview   Progress no change   Outcome Evaluation   Outcome Summary/Follow up Plan no changes in the preop holding phasse

## 2017-11-06 NOTE — DISCHARGE INSTRUCTIONS

## 2017-11-06 NOTE — ANESTHESIA PREPROCEDURE EVALUATION
Anesthesia Evaluation     Patient summary reviewed   history of anesthetic complications (Severe post op delirium):  NPO Solid Status: > 8 hours  NPO Liquid Status: > 8 hours     Airway   Mallampati: II  TM distance: >3 FB  Neck ROM: full  no difficulty expected  Dental - normal exam         Pulmonary - normal exam   (-) COPD, asthma, sleep apnea, not a smoker  Cardiovascular - normal exam    Patient on routine beta blocker and Beta blocker given within 24 hours of surgery    (+) hypertension, hyperlipidemia      Neuro/Psych  (+) psychiatric history Anxiety,    (-) seizures, TIA, CVA  GI/Hepatic/Renal/Endo    (+) obesity,  GERD well controlled, hypothyroidism,     Musculoskeletal     (+) myalgias,       ROS comment: Lumbar stenosis  Abdominal    Substance History      OB/GYN          Other                                    Anesthesia Plan    ASA 3     general   (Avoid versed. Patient has severe post op delirium.)  intravenous induction   Anesthetic plan and risks discussed with patient.

## 2017-11-07 NOTE — H&P (VIEW-ONLY)
Psychiatric  Jennifer Gomes  : 1940  MRN: [id number]  CSN: [id number]    History and Physical    Subjective   Jennifer Gomes is a 77 y.o. year old  who presents for consultation about surgery due to vaginal bleeding.  The patient had surgery in  to remove exposed vaginal mesh from a mid-urethral sling.  She reports that for the past year she has been having vaginal bleeding again, and suspects that there is exposed mesh again.  She reports that bleeding is intermittent, but also comments that sometimes it is really heavy and bright red - like a period.    Past Medical History:   Diagnosis Date   • Anxiety    • Arthritis    • Chronic pain    • Depression    • Disease of thyroid gland    • Fibromyalgia    • Hyperlipidemia    • Hypertension    • Incontinence    • Insomnia    • Lumbar stenosis    • Peptic ulcer    • Postmenopausal bleeding    • Restless legs      Past Surgical History:   Procedure Laterality Date   • BLADDER REPAIR      MESH HAD TO BE REMOVED IN    • BREAST CYST EXCISION     • CARPAL TUNNEL RELEASE     • CATARACT EXTRACTION W/ INTRAOCULAR LENS  IMPLANT, BILATERAL     • CYSTECTOMY     • DILATION AND CURETTAGE, DIAGNOSTIC / THERAPEUTIC     • ENDOSCOPY  2010    Short segment of Arriola's,Moderate chroninc esophagogastritis and negative H.pylori   • ENDOSCOPY N/A 2017    Procedure: ESOPHAGOGASTRODUODENOSCOPY WITH ANESTHESIA;  Surgeon: Tom Velasco DO;  Location: [facility] ENDOSCOPY;  Service:    • ORIF TIBIA/FIBULA FRACTURES Left    • VAGINAL MESH REVISION       Smoking status: Never Smoker                                                              Smokeless status: Never Used                          Current Outpatient Prescriptions:   •  APAP-isometheptene-dichloral -325 MG per capsule, Take 1 capsule by mouth 4 (Four) Times a Day As Needed for Headache., Disp: , Rfl:   •  atorvastatin (LIPITOR) 40 MG tablet, Take 40 mg by mouth Daily., Disp: , Rfl:    •  butalbital-aspirin-caffeine (FIORINAL) -40 MG capsule, Take  by mouth., Disp: , Rfl:   •  carbidopa-levodopa (SINEMET)  MG per tablet, Take  by mouth., Disp: , Rfl:   •  carvedilol (COREG) 6.25 MG tablet, TAKE 1 TABLET TWICE A DAY, Disp: , Rfl:   •  diltiazem XR (DILACOR XR) 240 MG 24 hr capsule, Take  by mouth., Disp: , Rfl:   •  FIBER FORMULA capsule, Take  by mouth., Disp: , Rfl:   •  irbesartan (AVAPRO) 300 MG tablet, Take  by mouth., Disp: , Rfl:   •  lansoprazole (PREVACID) 30 MG capsule, Take  by mouth., Disp: , Rfl:   •  levothyroxine (SYNTHROID, LEVOTHROID) 50 MCG tablet, 1 tablet daily, Disp: , Rfl:   •  oxyCODONE (ROXICODONE) 10 MG tablet, Take 10 mg by mouth Every 6 (Six) Hours As Needed for Moderate Pain  (GENERAL PAIN, HIPS). DR GUILHERME TENA HAS BEEN ON FOR14 YEARS, Disp: , Rfl:   •  SUMAtriptan (IMITREX) 50 MG tablet, , Disp: , Rfl:   •  vitamin D (ERGOCALCIFEROL) 61874 units capsule capsule, Take 50,000 Units by mouth 1 (One) Time Per Week., Disp: , Rfl:     Current Facility-Administered Medications:   •  sodium bicarbonate injection 2.5 mEq, 5 mL, Injection, Once, Dariela Pool MD    Allergies   Allergen Reactions   • Codeine Mental Status Change and Itching   • Ropinirole Hcl Shortness Of Breath   • Ambien [Zolpidem] Other (See Comments)     HYPER    • Eszopiclone Other (See Comments)     MAKES PT HYPER    • Pregabalin Dizziness   • Tizanidine Other (See Comments)     Terrible nightmares       Family History   Problem Relation Age of Onset   • Diabetes Mother    • Diabetes Sister    • Ovarian cancer Paternal Aunt    • Colon cancer Neg Hx    • Esophageal cancer Neg Hx      Review of Systems   Constitutional: Negative for unexpected weight change.   Eyes: Positive for visual disturbance (wears glasses).   Respiratory: Positive for shortness of breath (sometimes, with activity).    Cardiovascular: Chest pain: GERD, patient recently had endoscopy.   Gastrointestinal: Negative for  "abdominal pain and blood in stool.   Endocrine: Negative for cold intolerance and heat intolerance.   Genitourinary: Positive for enuresis, vaginal bleeding and vaginal pain (\"rough\" feeling). Negative for difficulty urinating and dysuria.   Musculoskeletal: Positive for arthralgias and back pain.   Neurological: Negative for dizziness.   Psychiatric/Behavioral: Negative for confusion.         Objective   /70  Ht 62\" (157.5 cm)  Wt 213 lb (96.6 kg)  BMI 38.96 kg/m2    Physical Exam   Physical Exam   Constitutional: She is oriented to person, place, and time. She appears well-developed and well-nourished. No distress.   HENT:   Head: Normocephalic and atraumatic.   Pulmonary/Chest: Effort normal.   Abdominal: Soft. There is no tenderness.   Genitourinary: Rectal exam shows anal tone normal. Pelvic exam was performed with patient supine. There is no tenderness or lesion on the right labia. There is no tenderness or lesion on the left labia. Uterus is not enlarged and not tender. Cervix exhibits no motion tenderness, no discharge and no friability. There is tenderness in the vagina. No bleeding in the vagina. No signs of injury around the vagina. No vaginal discharge found.   Genitourinary Comments: Tenderness on exam consistent with patient not being sexually active for some time.  Small (3-4 mm) area of exposed mesh not visible on speculum exam because it is behind a slight fold in anterior mucosa, but is palpable on digital exam.  Exposed mesh is just under symphysis on patient's L side.  No blood visible and area not consistent with history of heavy vaginal bleeding.  Pelvic u/s reveals 2.6 cm endometrial lining.   Neurological: She is alert and oriented to person, place, and time.   Psychiatric: She has a normal mood and affect. Her behavior is normal. Thought content normal.   Nursing note and vitals reviewed.      Labs  Lab Results   Component Value Date     09/06/2017    HGB 11.8 (L) 09/06/2017 "    HCT 34.8 (L) 09/06/2017    WBC 6.50 09/06/2017     09/06/2017    K 4.0 09/06/2017     09/06/2017    CO2 27.0 09/06/2017    BUN 25 (H) 09/06/2017    CREATININE 1.07 09/06/2017    GLUCOSE 115 (H) 09/06/2017    CALCIUM 9.8 09/06/2017        Assessment & Plan    Jnenifer was seen today for vaginal bleeding.    Diagnoses and all orders for this visit:    Erosion of vaginal mesh, initial encounter: Patient with most of sling removed in 2013, but now has a new area of exposed mesh.  The area is just under symphysis on L, but is only 3-4 mm.  The area is not consistent with the heavy vaginal bleeding that the patient reports, and so a pelvic u/s was done while the patient was in the office.  The patient will need a hysteroscopy/D&C for thick endometrial lining, but the small area of exposed mesh will be excised at the same time.  -     Case Request; Standing  -     ECG 12 Lead; Future  -     XR Chest 1 View; Future  -     ceFAZolin (ANCEF) 2 g in sodium chloride 0.9 % 100 mL IVPB; Infuse 2 g into a venous catheter 60 Minutes Prior to Surgery.  -     Case Request    Vaginal bleeding: Patient reports to be heavy and bright red at times.  Suspect bleeding to be associated with thick endometrial lining of 2.6.  Patient to have a hysteroscopy/D&C.  The small area of exposed mesh is not consistent with heavy vaginal bleeding.    BMI 38.0-38.9,adult    Chronic back pain, unspecified back location, unspecified back pain laterality    Hypertension, essential    Pre-op evaluation  -     CBC and Differential; Future    Other orders  -     Follow Anesthesia Guidelines / Standing Orders; Future  -     Follow Anesthesia Guidelines / Standing Orders; Standing  -     Place sequential compression device; Standing  -     Place TIFFANI hose; Standing  -     Type & Screen; Standing  -     Obtain informed consent; Standing        Shasta Madrigal MD  10/22/2017  8:27 PM

## 2017-11-07 NOTE — INTERVAL H&P NOTE
The patient was seen in the holding area prior to surgery and her H&P reviewed. The patient was examined and there are no changes to the H&P.  The rationale for why a D&C also needed to be performed was again explained to the patient.  She understands that I do not believe a small area of mesh erosion is causing her heavy vaginal bleeding.

## 2017-11-09 ENCOUNTER — TELEPHONE (OUTPATIENT)
Dept: OBSTETRICS AND GYNECOLOGY | Facility: CLINIC | Age: 77
End: 2017-11-09

## 2017-11-09 LAB
CYTO UR: NORMAL
LAB AP CASE REPORT: NORMAL
Lab: NORMAL
PATH REPORT.FINAL DX SPEC: NORMAL
PATH REPORT.GROSS SPEC: NORMAL

## 2017-11-10 RX ORDER — ATORVASTATIN CALCIUM 40 MG/1
TABLET, FILM COATED ORAL
Qty: 90 TABLET | Refills: 2 | Status: SHIPPED | OUTPATIENT
Start: 2017-11-10 | End: 2018-08-13 | Stop reason: SDUPTHER

## 2017-11-14 ENCOUNTER — OFFICE VISIT (OUTPATIENT)
Dept: OBSTETRICS AND GYNECOLOGY | Facility: CLINIC | Age: 77
End: 2017-11-14

## 2017-11-14 VITALS
BODY MASS INDEX: 39.75 KG/M2 | WEIGHT: 216 LBS | DIASTOLIC BLOOD PRESSURE: 64 MMHG | HEIGHT: 62 IN | SYSTOLIC BLOOD PRESSURE: 132 MMHG

## 2017-11-14 DIAGNOSIS — I10 CHRONIC HYPERTENSION: ICD-10-CM

## 2017-11-14 DIAGNOSIS — C54.1 MALIGNANT NEOPLASM OF ENDOMETRIUM (HCC): Primary | ICD-10-CM

## 2017-11-14 DIAGNOSIS — Z78.9 NONSMOKER: ICD-10-CM

## 2017-11-14 PROCEDURE — 99024 POSTOP FOLLOW-UP VISIT: CPT | Performed by: OBSTETRICS & GYNECOLOGY

## 2017-11-14 NOTE — PROGRESS NOTES
"Subjective   Chief Complaint   Patient presents with   • Post-op     pt here today for 1 week post op revision of vaginal mesh and d&c hysteroscopy. pt says that she is feeling worn out and tired since surgery. pt voices no other concerns.      Jennifer Gomes is a 77 y.o. year old  presenting to be seen for her post-operative visit.  She had a hysteroscopy/D&C 1 weeks ago.  Currently she reports no vaginal pain.  She is still having daily vaginal bleeding, but it seems to be decreasing slowly.  The patient is uncomfortable, but says that her pain is mostly in her back, and that it is not worse since surgery than it was before.    No Additional Complaints Reported    The following portions of the patient's history were reviewed and updated as appropriate:current medications and allergies    Review of Systems   Respiratory: Negative for shortness of breath.    Cardiovascular: Negative for chest pain.   Gastrointestinal: Negative for abdominal pain.   Genitourinary: Positive for vaginal bleeding. Negative for pelvic pain.   Musculoskeletal: Positive for arthralgias and back pain.        Ambulates with a cane         Objective   /64  Ht 62\" (157.5 cm)  Wt 216 lb (98 kg)  BMI 39.51 kg/m2    General:  well developed; well nourished  no acute distress   Abdomen: Not performed.   Pelvis: Not performed.          Assessment   1. Pt is 1 week s/p hysteroscopy/D&C with excision of a small piece of residual mesh from an old mid-urethral sling (which was already removed several years ago and for which the patient was a party in a class-action lawsuit)  2. Pathology reviewed with patient.  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  The patient prefers to have care as close to home as possible.     Plan   1. Pt being referrred to Dr. Miller in [place].  She understands that she will need a hysterectomy and that it will be scheduled at the time of her consultation.  Questions answered.  " Patient specifically wanted to know if this was related to her previous mid-urethral sling, but was reassured that the two were not related.  We reviewed her risk factors to include obesity and CHTN.    No orders of the defined types were placed in this encounter.         This note was electronically signed.    Shasta Madrigal MD  November 14, 2017

## 2017-11-30 NOTE — TELEPHONE ENCOUNTER
Dosage was increased at  Last appt. Was supposed to be 15mg qid #120 but nurse put it in as q 4 hrs #120. Pt took it q 4 hrs.   Dr Karan Camarillo approved pt to stay at q 4hrs

## 2017-12-01 RX ORDER — OXYCODONE HYDROCHLORIDE 15 MG/1
15 TABLET ORAL EVERY 4 HOURS PRN
Qty: 180 TABLET | Refills: 0 | Status: SHIPPED | OUTPATIENT
Start: 2017-12-01 | End: 2017-12-12 | Stop reason: SDUPTHER

## 2017-12-06 DIAGNOSIS — G89.4 CHRONIC PAIN SYNDROME: ICD-10-CM

## 2017-12-06 DIAGNOSIS — M48.061 SPINAL STENOSIS AT L4-L5 LEVEL: Chronic | ICD-10-CM

## 2017-12-06 DIAGNOSIS — Z87.42 H/O VAGINAL BLEEDING: ICD-10-CM

## 2017-12-06 DIAGNOSIS — N18.30 CHRONIC KIDNEY DISEASE, STAGE 3 (MODERATE): ICD-10-CM

## 2017-12-06 DIAGNOSIS — I10 ESSENTIAL HYPERTENSION: ICD-10-CM

## 2017-12-07 RX ORDER — BUTALBITAL, ASPIRIN, AND CAFFEINE 325; 50; 40 MG/1; MG/1; MG/1
1 CAPSULE ORAL EVERY 4 HOURS PRN
Qty: 40 CAPSULE | Refills: 2 | OUTPATIENT
Start: 2017-12-07 | End: 2018-02-27 | Stop reason: SDUPTHER

## 2017-12-07 RX ORDER — CYCLOBENZAPRINE HCL 10 MG
5 TABLET ORAL 3 TIMES DAILY PRN
Qty: 270 TABLET | Refills: 1 | Status: SHIPPED | OUTPATIENT
Start: 2017-12-07 | End: 2018-02-27 | Stop reason: SDUPTHER

## 2017-12-07 RX ORDER — CYCLOBENZAPRINE HCL 10 MG
TABLET ORAL
COMMUNITY
Start: 2017-09-07 | End: 2017-12-07 | Stop reason: SDUPTHER

## 2017-12-12 ENCOUNTER — HOSPITAL ENCOUNTER (OUTPATIENT)
Dept: PAIN MANAGEMENT | Age: 77
Discharge: HOME | End: 2017-12-12
Payer: MEDICARE

## 2017-12-12 VITALS
RESPIRATION RATE: 18 BRPM | HEART RATE: 82 BPM | DIASTOLIC BLOOD PRESSURE: 60 MMHG | TEMPERATURE: 97.1 F | BODY MASS INDEX: 41.59 KG/M2 | SYSTOLIC BLOOD PRESSURE: 152 MMHG | OXYGEN SATURATION: 97 % | WEIGHT: 226 LBS | HEIGHT: 62 IN

## 2017-12-12 DIAGNOSIS — M48.061 SPINAL STENOSIS AT L4-L5 LEVEL: ICD-10-CM

## 2017-12-12 DIAGNOSIS — I10 ESSENTIAL HYPERTENSION: ICD-10-CM

## 2017-12-12 DIAGNOSIS — G89.4 CHRONIC PAIN SYNDROME: ICD-10-CM

## 2017-12-12 DIAGNOSIS — N18.30 CHRONIC KIDNEY DISEASE, STAGE 3 (MODERATE): ICD-10-CM

## 2017-12-12 DIAGNOSIS — M47.816 LUMBAR FACET ARTHROPATHY: ICD-10-CM

## 2017-12-12 DIAGNOSIS — M70.61 TROCHANTERIC BURSITIS OF BOTH HIPS: ICD-10-CM

## 2017-12-12 DIAGNOSIS — M70.62 TROCHANTERIC BURSITIS OF BOTH HIPS: ICD-10-CM

## 2017-12-12 DIAGNOSIS — M48.061 SPINAL STENOSIS AT L4-L5 LEVEL: Chronic | ICD-10-CM

## 2017-12-12 DIAGNOSIS — Z87.42 H/O VAGINAL BLEEDING: ICD-10-CM

## 2017-12-12 PROCEDURE — 99214 OFFICE O/P EST MOD 30 MIN: CPT

## 2017-12-12 RX ORDER — SUMATRIPTAN 50 MG/1
50 TABLET, FILM COATED ORAL
Qty: 60 TABLET | Refills: 2 | Status: SHIPPED | OUTPATIENT
Start: 2017-12-12 | End: 2017-12-12 | Stop reason: SDUPTHER

## 2017-12-12 RX ORDER — SUMATRIPTAN 50 MG/1
50 TABLET, FILM COATED ORAL 2 TIMES DAILY PRN
Qty: 60 TABLET | Refills: 0 | Status: SHIPPED | OUTPATIENT
Start: 2017-12-12 | End: 2018-02-27 | Stop reason: SDUPTHER

## 2017-12-12 RX ORDER — OXYCODONE HYDROCHLORIDE 15 MG/1
15 TABLET ORAL EVERY 4 HOURS PRN
Qty: 180 TABLET | Refills: 0 | Status: SHIPPED | OUTPATIENT
Start: 2017-12-30 | End: 2018-01-31 | Stop reason: SDUPTHER

## 2017-12-12 ASSESSMENT — PAIN DESCRIPTION - LOCATION: LOCATION: BACK;LEG

## 2017-12-12 ASSESSMENT — PAIN DESCRIPTION - ONSET: ONSET: ON-GOING

## 2017-12-12 ASSESSMENT — PAIN DESCRIPTION - FREQUENCY: FREQUENCY: INTERMITTENT

## 2017-12-12 ASSESSMENT — PAIN DESCRIPTION - ORIENTATION: ORIENTATION: LOWER

## 2017-12-12 ASSESSMENT — PAIN DESCRIPTION - PAIN TYPE: TYPE: CHRONIC PAIN

## 2017-12-12 ASSESSMENT — PAIN DESCRIPTION - PROGRESSION: CLINICAL_PROGRESSION: NOT CHANGED

## 2017-12-12 ASSESSMENT — PAIN SCALES - GENERAL: PAINLEVEL_OUTOF10: 8

## 2017-12-12 ASSESSMENT — PAIN DESCRIPTION - DESCRIPTORS: DESCRIPTORS: THROBBING;SHOOTING

## 2017-12-12 ASSESSMENT — PAIN DESCRIPTION - DIRECTION: RADIATING_TOWARDS: INTO BILATERAL LEGS

## 2017-12-12 ASSESSMENT — ACTIVITIES OF DAILY LIVING (ADL): EFFECT OF PAIN ON DAILY ACTIVITIES: LIMITS ACTIVITY

## 2017-12-12 NOTE — PROGRESS NOTES
Nursing Admission Record    Current Issues / Falls / ER Visits:  No    Percentage of Pain Relief after Last Procedure:  70 %    How long lasted:  2 months    Radiology exams received during the last 12 months: Yes XR Lumbar Spine       When 8/2017                                              Where [address] on chart: Yes         Imaging records requested: No  MRI exams received in the past 2 years:  Yes MRI Lumbar Spine 9/2016 @ [facility]  Physical therapy during the last 6 months: No       When: na                                             Where  na  Labs during the last 12 months: Yes    Education Provided:  [x] Review of Faby Shape  [x] Agreement Review  [x] Compliance Issues Discussed    [] Cognitive Behavior Needs [x] Exercise [] Review of Test [] Financial Issues  [] Tobacco/Alcohol Use [x] Teaching [] New Patient [] Picture Obtained    Physician Plan:  [] Outgoing Referral  [] Pharmacy Consult  [] Test Ordered   [] Obtained Test Results / Consult Notes  [] UDS due at next visit, verified per EPIC      [] Suspected Physical Abuse or Suicide Risk assessed - IF YES COMPLETE QUESTIONS BELOW    If any of the following questions are answered yes - contact attending physician for referral:    Has been considering harming self to escape stress, pain problems? [] YES  [x] NO  Has a suicide plan? [] YES  [x] NO  Has attempted suicide in the past?   [] YES  [x] NO  Has a close friend or family member who committed suicide? [] YES  [x] NO    Patient Referred To :      Additional Notes:    Assessment Completed by:  Electronically signed by Isabella Oneal RN on 12/12/2017 at 11:12 AM

## 2017-12-12 NOTE — PROGRESS NOTES
Rachel Garcia/Christiano  Patient Pain Assessment    Primary / Referring Physician: Grayson Singh MD    Chief complaint:   Chief Complaint   Patient presents with    Lower Back Pain     radiates into bilateral legs       History of Present Illness -   Carlyn Osei is a 78 y.o. female that presents to the office for follow-up of lumbar facet injections at level L4-L5 & L5-S1 and bilateral sacroilliac joint injections. Patient states she received 70% relief for 2 months from injections. She is getting ready to have surgery due to cancer being found in her uterus. Current Pain Assessment  Pain Assessment  Pain Assessment: 0-10  Pain Level: 8  Pain Type: Chronic pain  Pain Location: Back, Leg  Pain Orientation: Lower  Pain Radiating Towards: into bilateral legs  Pain Descriptors: Throbbing, Shooting  Pain Frequency: Intermittent  Pain Onset: On-going  Clinical Progression: Not changed  Effect of Pain on Daily Activities: limits activity  Patient's Stated Pain Goal: No pain  Pain Intervention(s): Medication (see eMar), Repositioned, Rest  Response to Pain Intervention: Patient Satisfied  Multiple Pain Sites: No      Pain medication effectiveness:   Reports that pain medication helps to increase activities of daily living    Issues of concern (physical, mental, social) or changes in condition since last visit per patient report: none    BMI: Body mass index is 41.34 kg/m². low fat, low carb diet discussed for weight reduction    Activity: discussed exercise as beneficial to pain reduction, encouraged stretching exercise with a focus on torso strengthening.      Acute Bowel or Bladder Changes: no    Side Effects of Medication: no    Social History  Social History     Social History    Marital status:      Spouse name: N/A    Number of children: N/A    Years of education: N/A     Social History Main Topics    Smoking status: Never Smoker    Smokeless tobacco: Never Used    Alcohol use Yes Comment: occassionally    Drug use: No    Sexual activity: Not Asked     Other Topics Concern    None     Social History Narrative    None      reports that she does not use drugs. History   Alcohol Use    Yes     Comment: occassionally     Review of Systems:  Other than stated above in the HPI, is negative     UDS done today? no  Are you currently pregnant? no     Tone Lanius compliant? yes  Concern for prescription abuse? no          Past Medical History      Diagnosis Date    Adenocarcinoma of endometrium, stage 1 ([facility] [address] 11/14/2017    Anemia     Arthritis     CAD (coronary artery disease)     \"Stiff Valve\"    Chronic kidney disease     Depression     Fibrocystic breast disease     5/2/17 biopsy - benign FCBD w/microcalcifications - [facility]     GERD (gastroesophageal reflux disease)     H/O vaginal bleeding     managed Dr Arnulfo Lemons    Hypertension     Hypothyroidism (acquired)     Hypothyroidism (acquired)     Mixed hyperlipidemia     Obesity     Osteoarthritis     Pain of both hip joints 1/18/2016    Situational anxiety     Sleep apnea     Vitamin D deficiency        Surgical History  Past Surgical History:   Procedure Laterality Date    BREAST BIOPSY Right     BREAST SURGERY Left     Biopsy, Benign    CYST REMOVAL Left     Shoulder cyst, benign    DILATION AND CURETTAGE OF UTERUS      EYE SURGERY  03/2017    LEG SURGERY Left     Tib/Fib ORIF    TONSILLECTOMY         Medications  Current Outpatient Prescriptions   Medication Sig Dispense Refill    butalbital-aspirin-caffeine (FIORINAL) -40 MG capsule Take 1 capsule by mouth every 4 hours as needed for Headaches (max 6/day month supply) . 40 capsule 2    cyclobenzaprine (FLEXERIL) 10 MG tablet Take 0.5 tablets by mouth 3 times daily as needed for Muscle spasms 270 tablet 1    oxyCODONE (OXY-IR) 15 MG immediate release tablet Take 1 tablet by mouth every 4 hours as needed for Pain  . Joy Nelson  Earliest Fill Date: 12/1/17 180 tablet 0    atorvastatin (LIPITOR) 40 MG tablet TAKE 1 TABLET DAILY AS DIRECTED 90 tablet 2    pramipexole (MIRAPEX) 0.5 MG tablet Take 1 tablet by mouth 3 times daily 90 tablet 3    celecoxib (CELEBREX) 100 MG capsule Take one capsule 3-4 times weekly prn,  New dose, decreased mg per Dr Sofie Gaitan 50 capsule 2    carbidopa-levodopa (SINEMET)  MG per tablet Take 1 tablet by mouth 4 times daily For restless legs and cramping 360 tablet 0    Prenatal Multivit-Min-Fe-FA (PRENATAL 1 + IRON PO) Take by mouth      loratadine (CLARITIN) 10 MG capsule Take 10 mg by mouth daily      FIBER FORMULA PO Take by mouth      ergocalciferol (ERGOCALCIFEROL) 90283 units capsule Take 50,000 Units by mouth once a week      carvedilol (COREG) 6.25 MG tablet TAKE 1 TABLET TWICE A  tablet 3    SUMAtriptan (IMITREX) 50 MG tablet Take 1 tablet by mouth once as needed for Migraine Max 2/day 3 month supply 60 tablet 2    levothyroxine (SYNTHROID) 50 MCG tablet Take 1 tablet by mouth       aspirin 81 MG tablet Take 81 mg by mouth daily      lansoprazole (PREVACID) 30 MG capsule Take by mouth daily       diltiazem (TIAZAC) 240 MG SR capsule Take by mouth daily       LANSOPRAZOLE PO Take 30 mg by mouth daily      irbesartan (AVAPRO) 300 MG tablet Take 300 mg by mouth daily       No current facility-administered medications for this encounter. Allergies  Ambien [zolpidem tartrate]; Codeine; Lyrica [pregabalin]; Morphine; Requip [ropinirole hcl]; and Tizanidine    Family History  family history includes Cancer in her mother. Objective Information:  Vitals: BP (!) 152/60   Pulse 82   Temp 97.1 °F (36.2 °C) (Oral)   Resp 18   Ht 5' 2\" (1.575 m)   Wt 226 lb (102.5 kg)   SpO2 97%   BMI 41.34 kg/m² , Body mass index is 41.34 kg/m².     Physical Exam  General appearance: no acute distress  Head: NCAT, EOMI  SKIN: Warm, Dry   Musculoskeletal: ambulatory per self and cane  Pain in back  Neurologic: alert and oriented X 3, speech clear  Mood and affect: appropriate, no SI or HI     UDS:  Lab Results   Component Value Date    BARBITURATES SEE BELOW 10/23/2017    BARBITURATES Positive 10/23/2017    BENZODIAZURI Negative 10/23/2017    OPIAU SEE BELOW 10/23/2017    OPIAU Negative 10/23/2017    OXYCOOXYMO SEE BELOW 10/23/2017    OXYCOOXYMO Positive 10/23/2017    PHENCYCU Negative 10/23/2017    LABMETH Negative 10/23/2017    PPXUR Negative 10/23/2017    MEPERIDU Negative 10/23/2017    FENTU Negative 10/23/2017    ETHUQN Negative 10/23/2017    CANNANBINURI Negative 10/23/2017    AMPHETUR Negative 10/23/2017    COCAINEMETUR Negative 10/23/2017   Nursing Admission Record    Current Issues / Falls / ER Visits:  No    Percentage of Pain Relief after Last Procedure:  70 %    How long lasted:  2 months    Radiology exams received during the last 12 months: Yes XR Lumbar Spine       When 8/2017                                              Where [address] [place] [address] chart: Yes         Imaging records requested: No  MRI exams received in the past 2 years:  Yes MRI Lumbar Spine 9/2016 [facility]  Physical therapy during the last 6 months: No       When: na                                             Where  na  Labs during the last 12 months: Yes    Injections for pain control discussed: Yes     ASSESSMENT  Patient Active Problem List   Diagnosis    Pain of both hip joints    Trochanteric bursitis of both hips    Trochanteric bursitis of both hips    Trochanteric bursitis of both hips    Lumbar facet arthropathy    Spinal stenosis at L4-L5 level    Spinal stenosis at L4-L5 level    Hypothyroidism (acquired)    Hypertension    Mixed hyperlipidemia    Chronic kidney disease    Fibrocystic breast disease    Chronic pain    H/O vaginal bleeding    Lumbar facet arthropathy    Adenocarcinoma of endometrium, stage 1 ([facility] [address]        PLAN  1.   Patient is to call with any questions or concerns which may arise prior to the next office visit 2.  Continue current dosage of Oxycodone HCL 15mg #150 per STEPHANI fill date   3. F/U appointment in 2 months with me    Discussion:    Education Provided:  [x] Review of [place] [x] Agreement Review [x] Compliance Issues Discussed   [] Cognitive Behavior Needs [x] Exercise [] Review of Test [] Financial Issues  [] Tobacco/Alcohol Use [x] Teaching [] New Patient [] Picture Obtained    Controlled Substance Monitoring:   Discussed with patient possible medication side effects, risk of tolerance and/or dependence, and alternative treatments. Discussed the growing epidemic in the [address] [place] [address] with the overprescribing and at times the abuse of narcotics. Discussed the detrimental effects of long term narcotic use in younger patients. Patient encouraged to set daily goals of exercising and decreasing daily narcotic intake. Discussed with the patient about the development of hyperalgesia with long term narcotic intake.      Electronically signed by Pastor Jim RN on 12/12/2017 at 11:16 AM

## 2018-02-01 ENCOUNTER — HOSPITAL ENCOUNTER (OUTPATIENT)
Dept: RADIATION ONCOLOGY | Facility: HOSPITAL | Age: 78
Setting detail: RADIATION/ONCOLOGY SERIES
End: 2018-02-01

## 2018-02-01 RX ORDER — OXYCODONE HYDROCHLORIDE 15 MG/1
15 TABLET ORAL EVERY 4 HOURS PRN
Qty: 180 TABLET | Refills: 0 | Status: SHIPPED | OUTPATIENT
Start: 2018-02-02 | End: 2018-02-27 | Stop reason: SDUPTHER

## 2018-02-02 ENCOUNTER — TELEPHONE (OUTPATIENT)
Dept: INTERNAL MEDICINE | Age: 78
End: 2018-02-02

## 2018-02-02 RX ORDER — GUAIFENESIN 600 MG/1
600 TABLET, EXTENDED RELEASE ORAL 2 TIMES DAILY
Qty: 20 TABLET | Refills: 0 | Status: SHIPPED | OUTPATIENT
Start: 2018-02-02 | End: 2018-03-07

## 2018-02-02 RX ORDER — AZITHROMYCIN 250 MG/1
TABLET, FILM COATED ORAL
Qty: 1 PACKET | Refills: 0 | Status: SHIPPED | OUTPATIENT
Start: 2018-02-02 | End: 2018-02-12

## 2018-02-15 ENCOUNTER — TELEPHONE (OUTPATIENT)
Dept: INTERNAL MEDICINE | Age: 78
End: 2018-02-15

## 2018-02-15 ENCOUNTER — CONSULT (OUTPATIENT)
Dept: RADIATION ONCOLOGY | Facility: HOSPITAL | Age: 78
End: 2018-02-15

## 2018-02-15 VITALS
HEIGHT: 62 IN | BODY MASS INDEX: 38.46 KG/M2 | WEIGHT: 209 LBS | SYSTOLIC BLOOD PRESSURE: 147 MMHG | DIASTOLIC BLOOD PRESSURE: 54 MMHG

## 2018-02-15 DIAGNOSIS — Z71.9 ENCOUNTER FOR CONSULTATION: ICD-10-CM

## 2018-02-15 DIAGNOSIS — Z90.721 S/P HYSTERECTOMY WITH OOPHORECTOMY: ICD-10-CM

## 2018-02-15 DIAGNOSIS — C54.1 ADENOCARCINOMA OF ENDOMETRIUM (HCC): Primary | ICD-10-CM

## 2018-02-15 DIAGNOSIS — E66.3 OVERWEIGHT: ICD-10-CM

## 2018-02-15 DIAGNOSIS — Z78.9 NON-SMOKER: ICD-10-CM

## 2018-02-15 DIAGNOSIS — Z90.710 S/P HYSTERECTOMY WITH OOPHORECTOMY: ICD-10-CM

## 2018-02-15 PROCEDURE — G0463 HOSPITAL OUTPT CLINIC VISIT: HCPCS | Performed by: RADIOLOGY

## 2018-02-16 ENCOUNTER — HOSPITAL ENCOUNTER (OUTPATIENT)
Dept: RADIATION ONCOLOGY | Facility: HOSPITAL | Age: 78
Setting detail: RADIATION/ONCOLOGY SERIES
Discharge: HOME | End: 2018-02-16

## 2018-02-16 PROCEDURE — 77290 THER RAD SIMULAJ FIELD CPLX: CPT | Performed by: RADIOLOGY

## 2018-02-16 PROCEDURE — 77334 RADIATION TREATMENT AID(S): CPT | Performed by: RADIOLOGY

## 2018-02-20 PROCEDURE — 77300 RADIATION THERAPY DOSE PLAN: CPT | Performed by: RADIOLOGY

## 2018-02-20 PROCEDURE — 77301 RADIOTHERAPY DOSE PLAN IMRT: CPT | Performed by: RADIOLOGY

## 2018-02-20 PROCEDURE — 77338 DESIGN MLC DEVICE FOR IMRT: CPT | Performed by: RADIOLOGY

## 2018-02-21 NOTE — TELEPHONE ENCOUNTER
Called and spoke with pt. She states that if she takes an anti motion sickness tablet (similar to dramamine) along with her pain medication, she does fine, but if not she experiences nausea. She wants to know if it is ok for her to take these tablets regularly or if there is something else you would suggest for her. Please advise.

## 2018-02-27 ENCOUNTER — HOSPITAL ENCOUNTER (OUTPATIENT)
Dept: PAIN MANAGEMENT | Age: 78
Discharge: HOME | End: 2018-02-27
Payer: MEDICARE

## 2018-02-27 VITALS
HEIGHT: 62 IN | RESPIRATION RATE: 18 BRPM | BODY MASS INDEX: 39.56 KG/M2 | HEART RATE: 84 BPM | TEMPERATURE: 97.5 F | DIASTOLIC BLOOD PRESSURE: 64 MMHG | OXYGEN SATURATION: 96 % | WEIGHT: 215 LBS | SYSTOLIC BLOOD PRESSURE: 132 MMHG

## 2018-02-27 DIAGNOSIS — M47.816 LUMBAR FACET ARTHROPATHY: ICD-10-CM

## 2018-02-27 DIAGNOSIS — N18.30 CHRONIC KIDNEY DISEASE, STAGE 3 (MODERATE): ICD-10-CM

## 2018-02-27 DIAGNOSIS — I10 ESSENTIAL HYPERTENSION: ICD-10-CM

## 2018-02-27 DIAGNOSIS — Z87.42 H/O VAGINAL BLEEDING: ICD-10-CM

## 2018-02-27 DIAGNOSIS — G89.4 CHRONIC PAIN SYNDROME: ICD-10-CM

## 2018-02-27 DIAGNOSIS — M48.061 SPINAL STENOSIS AT L4-L5 LEVEL: Chronic | ICD-10-CM

## 2018-02-27 PROBLEM — Z90.710 S/P HYSTERECTOMY WITH OOPHORECTOMY: Status: ACTIVE | Noted: 2018-02-27

## 2018-02-27 PROBLEM — Z90.721 S/P HYSTERECTOMY WITH OOPHORECTOMY: Status: ACTIVE | Noted: 2018-02-27

## 2018-02-27 PROCEDURE — 99214 OFFICE O/P EST MOD 30 MIN: CPT

## 2018-02-27 RX ORDER — OXYCODONE HYDROCHLORIDE 15 MG/1
15 TABLET ORAL EVERY 4 HOURS PRN
Qty: 180 TABLET | Refills: 0 | Status: SHIPPED | OUTPATIENT
Start: 2018-03-04 | End: 2018-03-29 | Stop reason: SDUPTHER

## 2018-02-27 RX ORDER — SUMATRIPTAN 50 MG/1
50 TABLET, FILM COATED ORAL 2 TIMES DAILY PRN
Qty: 60 TABLET | Refills: 0 | Status: SHIPPED | OUTPATIENT
Start: 2018-02-27 | End: 2018-05-01 | Stop reason: SDUPTHER

## 2018-02-27 RX ORDER — BUTALBITAL, ASPIRIN, AND CAFFEINE 325; 50; 40 MG/1; MG/1; MG/1
1 CAPSULE ORAL EVERY 6 HOURS PRN
Qty: 40 CAPSULE | Refills: 2 | Status: SHIPPED | OUTPATIENT
Start: 2018-02-27 | End: 2018-06-01 | Stop reason: SDUPTHER

## 2018-02-27 RX ORDER — CYCLOBENZAPRINE HCL 10 MG
10 TABLET ORAL 2 TIMES DAILY PRN
Qty: 180 TABLET | Refills: 0 | Status: SHIPPED | OUTPATIENT
Start: 2018-02-27 | End: 2018-05-29 | Stop reason: SDUPTHER

## 2018-02-27 ASSESSMENT — PAIN DESCRIPTION - DESCRIPTORS: DESCRIPTORS: ACHING;THROBBING;RADIATING

## 2018-02-27 ASSESSMENT — PAIN DESCRIPTION - ONSET: ONSET: ON-GOING

## 2018-02-27 ASSESSMENT — PAIN DESCRIPTION - ORIENTATION: ORIENTATION: RIGHT;LEFT;LOWER

## 2018-02-27 ASSESSMENT — PAIN DESCRIPTION - FREQUENCY: FREQUENCY: CONTINUOUS

## 2018-02-27 ASSESSMENT — PAIN SCALES - GENERAL: PAINLEVEL_OUTOF10: 8

## 2018-02-27 ASSESSMENT — PAIN DESCRIPTION - PAIN TYPE: TYPE: CHRONIC PAIN

## 2018-02-27 ASSESSMENT — PAIN DESCRIPTION - LOCATION: LOCATION: BACK;LEG;GENERALIZED

## 2018-02-27 ASSESSMENT — ACTIVITIES OF DAILY LIVING (ADL): EFFECT OF PAIN ON DAILY ACTIVITIES: LIMITS ACTIVITIES

## 2018-02-27 ASSESSMENT — PAIN DESCRIPTION - PROGRESSION: CLINICAL_PROGRESSION: NOT CHANGED

## 2018-02-27 NOTE — PROGRESS NOTES
Nursing Admission Record    Current Issues / Falls / ER Visits:  Had surgery due to cancer and removed uterus and cervix in December 2017. Currently about to start radiation treatment under care of Dr. Becca Hernandez. Percentage of Pain Relief after Last Procedure:  70 %    How long lasted:  2 months    Radiology exams received during the last 12 months: Yes Lumbar Spine xray       When 08/2017                                              Where [address] on chart: Yes         Imaging records requested: No  MRI exams received in the past 2 years:  Lumbar spine MRI in Sept 2016 at [facility]  Physical therapy during the last 6 months: No       When: na                                             Where  na  Labs during the last 12 months: Yes    Education Provided:  [x] Review of Verle Fuchs  [] Agreement Review  [] Compliance Issues Discussed    [] Cognitive Behavior Needs [x] Exercise [] Review of Test [] Financial Issues  [x] Tobacco/Alcohol Use [x] Teaching [] New Patient [] Picture Obtained    Physician Plan:  [] Outgoing Referral  [] Pharmacy Consult  [] Test Ordered   [] Obtained Test Results / Consult Notes  [] UDS due at next visit, verified per EPIC      [] Suspected Physical Abuse or Suicide Risk assessed - IF YES COMPLETE QUESTIONS BELOW    If any of the following questions are answered yes - contact attending physician for referral:    Has been considering harming self to escape stress, pain problems? [] YES  [] NO  Has a suicide plan? [] YES  [] NO  Has attempted suicide in the past?   [] YES  [] NO  Has a close friend or family member who committed suicide? [] YES  [] NO    Patient Referred To :      Additional Notes:    Assessment Completed by:  Electronically signed by Tanja Hernandez RN on 2/27/2018 at 1:56 PM

## 2018-02-27 NOTE — PROGRESS NOTES
includes Cancer in her mother. Allergies:  Ambien [zolpidem tartrate]; Codeine; Lyrica [pregabalin]; Morphine; Requip [ropinirole hcl]; and Tizanidine     Medications:  Current Outpatient Prescriptions   Medication Sig Dispense Refill    guaiFENesin (MUCINEX) 600 MG extended release tablet Take 1 tablet by mouth 2 times daily 20 tablet 0    oxyCODONE (OXY-IR) 15 MG immediate release tablet Take 1 tablet by mouth every 4 hours as needed for Pain for up to 30 days . Opal Matta Earliest Fill Date: 2/2/18 180 tablet 0    levothyroxine (SYNTHROID) 50 MCG tablet TAKE 1 TABLET BY MOUTH ONCE DAILY. 30 tablet 5    SUMAtriptan (IMITREX) 50 MG tablet Take 1 tablet by mouth 2 times daily as needed for Migraine Max 2/day 3 month supply 60 tablet 0    carbidopa-levodopa (SINEMET)  MG per tablet Take 1 tablet by mouth 4 times daily For restless legs and cramping 360 tablet 0    butalbital-aspirin-caffeine (FIORINAL) -40 MG capsule Take 1 capsule by mouth every 4 hours as needed for Headaches (max 6/day month supply) .  40 capsule 2    cyclobenzaprine (FLEXERIL) 10 MG tablet Take 0.5 tablets by mouth 3 times daily as needed for Muscle spasms 270 tablet 1    atorvastatin (LIPITOR) 40 MG tablet TAKE 1 TABLET DAILY AS DIRECTED 90 tablet 2    Prenatal Multivit-Min-Fe-FA (PRENATAL 1 + IRON PO) Take by mouth      loratadine (CLARITIN) 10 MG capsule Take 10 mg by mouth daily      FIBER FORMULA PO Take by mouth      ergocalciferol (ERGOCALCIFEROL) 77530 units capsule Take 50,000 Units by mouth once a week      carvedilol (COREG) 6.25 MG tablet TAKE 1 TABLET TWICE A  tablet 3    aspirin 81 MG tablet Take 81 mg by mouth daily      lansoprazole (PREVACID) 30 MG capsule Take by mouth daily       diltiazem (TIAZAC) 240 MG SR capsule Take by mouth daily       LANSOPRAZOLE PO Take 30 mg by mouth daily      irbesartan (AVAPRO) 300 MG tablet Take 300 mg by mouth daily       No current facility-administered medications for this encounter. UDS:     [x] Reviewed and monitoring, see labs         Vitals:  /64   Pulse 84   Temp 97.5 °F (36.4 °C) (Temporal)   Resp 18   Ht 5' 2\" (1.575 m)   Wt 215 lb (97.5 kg)   SpO2 96%   BMI 39.32 kg/m²       Physical Exam:  General appearance: no acute distress   Head: NCAT, EOMI  Skin: Warm, Dry   Musculoskeletal: ambulatory per self, steady gait  Neurologic: alert and oriented X 3, speech clear  Mood and affect: appropriate, no SI or HI    Assessment:    *      Lumbar DDD  *      Lumbar Facet Syndrome  *      Muscle Spasms  *      Migraine Headache (per history)    Plan:   [x]  Patient is to call with any questions or concerns which may arise prior to the next office visit    [x]  Continue current medications per our office, see medication tabSTEPHANI reviewed   []  Add. .. []  Discontinue. .. []  Imaging order given to patient   []  PT order given to patient   [x]  Procedure scheduled for next visit, (Bilateral Lumbar Facet injections L5-S1, L4-5 and Bilateral SI joint injections, with sedation)   []  . .. Controlled Substance Monitoring: Discussed with patient possible medication side effects, risk of tolerance and/or dependence, and alternative treatments. Discussed the effects of long term narcotic use. Patient encouraged to set daily goals of exercising and decreasing daily narcotic intake.       Electronically signed by TAMIA York

## 2018-03-01 ENCOUNTER — HOSPITAL ENCOUNTER (OUTPATIENT)
Dept: RADIATION ONCOLOGY | Facility: HOSPITAL | Age: 78
Setting detail: RADIATION/ONCOLOGY SERIES
End: 2018-03-01

## 2018-03-05 ENCOUNTER — DOCUMENTATION (OUTPATIENT)
Dept: ONCOLOGY | Facility: HOSPITAL | Age: 78
End: 2018-03-05

## 2018-03-05 ENCOUNTER — HOSPITAL ENCOUNTER (OUTPATIENT)
Dept: RADIATION ONCOLOGY | Facility: HOSPITAL | Age: 78
Setting detail: RADIATION/ONCOLOGY SERIES
Discharge: HOME | End: 2018-03-05

## 2018-03-05 PROCEDURE — 77386: CPT | Performed by: RADIOLOGY

## 2018-03-06 PROCEDURE — 77386: CPT | Performed by: RADIOLOGY

## 2018-03-07 ENCOUNTER — HOSPITAL ENCOUNTER (OUTPATIENT)
Dept: RADIATION ONCOLOGY | Facility: HOSPITAL | Age: 78
Setting detail: RADIATION/ONCOLOGY SERIES
End: 2018-03-07

## 2018-03-07 ENCOUNTER — OFFICE VISIT (OUTPATIENT)
Dept: INTERNAL MEDICINE | Age: 78
End: 2018-03-07
Payer: MEDICARE

## 2018-03-07 VITALS
HEART RATE: 78 BPM | DIASTOLIC BLOOD PRESSURE: 68 MMHG | BODY MASS INDEX: 39.01 KG/M2 | WEIGHT: 212 LBS | HEIGHT: 62 IN | SYSTOLIC BLOOD PRESSURE: 120 MMHG | OXYGEN SATURATION: 98 %

## 2018-03-07 DIAGNOSIS — Z87.42 H/O VAGINAL BLEEDING: ICD-10-CM

## 2018-03-07 DIAGNOSIS — M48.061 SPINAL STENOSIS AT L4-L5 LEVEL: Chronic | ICD-10-CM

## 2018-03-07 DIAGNOSIS — C54.1 ADENOCARCINOMA OF ENDOMETRIUM, STAGE 1 (HCC): Primary | ICD-10-CM

## 2018-03-07 DIAGNOSIS — C54.1 ADENOCARCINOMA OF ENDOMETRIUM, STAGE 1 (HCC): ICD-10-CM

## 2018-03-07 DIAGNOSIS — E78.2 MIXED HYPERLIPIDEMIA: ICD-10-CM

## 2018-03-07 DIAGNOSIS — N18.30 CHRONIC KIDNEY DISEASE, STAGE 3 (MODERATE): ICD-10-CM

## 2018-03-07 DIAGNOSIS — I10 ESSENTIAL HYPERTENSION: ICD-10-CM

## 2018-03-07 DIAGNOSIS — N18.30 STAGE 3 CHRONIC KIDNEY DISEASE (HCC): ICD-10-CM

## 2018-03-07 DIAGNOSIS — E03.9 HYPOTHYROIDISM (ACQUIRED): ICD-10-CM

## 2018-03-07 DIAGNOSIS — G89.4 CHRONIC PAIN SYNDROME: ICD-10-CM

## 2018-03-07 LAB
ALBUMIN SERPL-MCNC: 3.9 G/DL (ref 3.5–5.2)
ALP BLD-CCNC: 100 U/L (ref 35–104)
ALT SERPL-CCNC: <5 U/L (ref 5–33)
ANION GAP SERPL CALCULATED.3IONS-SCNC: 19 MMOL/L (ref 7–19)
AST SERPL-CCNC: 13 U/L (ref 5–32)
BILIRUB SERPL-MCNC: 0.4 MG/DL (ref 0.2–1.2)
BUN BLDV-MCNC: 42 MG/DL (ref 8–23)
CALCIUM SERPL-MCNC: 10.2 MG/DL (ref 8.8–10.2)
CHLORIDE BLD-SCNC: 99 MMOL/L (ref 98–111)
CHOLESTEROL, TOTAL: 162 MG/DL (ref 160–199)
CO2: 20 MMOL/L (ref 22–29)
CREAT SERPL-MCNC: 1.7 MG/DL (ref 0.5–0.9)
GFR NON-AFRICAN AMERICAN: 29
GLUCOSE BLD-MCNC: 103 MG/DL (ref 74–109)
HCT VFR BLD CALC: 36 % (ref 37–47)
HDLC SERPL-MCNC: 48 MG/DL (ref 65–121)
HEMOGLOBIN: 11.3 G/DL (ref 12–16)
LDL CHOLESTEROL CALCULATED: 86 MG/DL
MCH RBC QN AUTO: 28.8 PG (ref 27–31)
MCHC RBC AUTO-ENTMCNC: 31.4 G/DL (ref 33–37)
MCV RBC AUTO: 91.6 FL (ref 81–99)
PDW BLD-RTO: 12.4 % (ref 11.5–14.5)
PLATELET # BLD: 191 K/UL (ref 130–400)
PMV BLD AUTO: 12 FL (ref 9.4–12.3)
POTASSIUM SERPL-SCNC: 4.7 MMOL/L (ref 3.5–5)
RBC # BLD: 3.93 M/UL (ref 4.2–5.4)
SODIUM BLD-SCNC: 138 MMOL/L (ref 136–145)
TOTAL PROTEIN: 7.1 G/DL (ref 6.6–8.7)
TRIGL SERPL-MCNC: 141 MG/DL (ref 0–149)
TSH SERPL DL<=0.05 MIU/L-ACNC: 4.57 UIU/ML (ref 0.27–4.2)
WBC # BLD: 7.5 K/UL (ref 4.8–10.8)

## 2018-03-07 PROCEDURE — G8400 PT W/DXA NO RESULTS DOC: HCPCS | Performed by: INTERNAL MEDICINE

## 2018-03-07 PROCEDURE — 77336 RADIATION PHYSICS CONSULT: CPT | Performed by: RADIOLOGY

## 2018-03-07 PROCEDURE — 1036F TOBACCO NON-USER: CPT | Performed by: INTERNAL MEDICINE

## 2018-03-07 PROCEDURE — G8484 FLU IMMUNIZE NO ADMIN: HCPCS | Performed by: INTERNAL MEDICINE

## 2018-03-07 PROCEDURE — G8417 CALC BMI ABV UP PARAM F/U: HCPCS | Performed by: INTERNAL MEDICINE

## 2018-03-07 PROCEDURE — 3017F COLORECTAL CA SCREEN DOC REV: CPT | Performed by: INTERNAL MEDICINE

## 2018-03-07 PROCEDURE — 1090F PRES/ABSN URINE INCON ASSESS: CPT | Performed by: INTERNAL MEDICINE

## 2018-03-07 PROCEDURE — 77386: CPT | Performed by: RADIOLOGY

## 2018-03-07 PROCEDURE — 99214 OFFICE O/P EST MOD 30 MIN: CPT | Performed by: INTERNAL MEDICINE

## 2018-03-07 PROCEDURE — G8427 DOCREV CUR MEDS BY ELIG CLIN: HCPCS | Performed by: INTERNAL MEDICINE

## 2018-03-07 PROCEDURE — 1123F ACP DISCUSS/DSCN MKR DOCD: CPT | Performed by: INTERNAL MEDICINE

## 2018-03-07 PROCEDURE — 4040F PNEUMOC VAC/ADMIN/RCVD: CPT | Performed by: INTERNAL MEDICINE

## 2018-03-07 RX ORDER — LEVOTHYROXINE SODIUM 0.05 MG/1
50 TABLET ORAL DAILY
Qty: 90 TABLET | Refills: 3 | Status: SHIPPED | OUTPATIENT
Start: 2018-03-07 | End: 2018-06-12 | Stop reason: SDUPTHER

## 2018-03-07 RX ORDER — IRBESARTAN 150 MG/1
300 TABLET ORAL DAILY
Qty: 90 TABLET | Refills: 3 | Status: SHIPPED | OUTPATIENT
Start: 2018-03-07 | End: 2018-03-26 | Stop reason: DRUGHIGH

## 2018-03-07 ASSESSMENT — ENCOUNTER SYMPTOMS
ABDOMINAL PAIN: 0
SORE THROAT: 0
RHINORRHEA: 0
SHORTNESS OF BREATH: 0
NAUSEA: 0
TROUBLE SWALLOWING: 0
COUGH: 0

## 2018-03-07 NOTE — PROGRESS NOTES
[facility] Internal Medicine    Chief Complaint   Patient presents with   Marshell Abler Other     has had a d and c, dx with cancer, had hysterectomy. Starting Radiation treatments, wants to get a handicap form        HPI:Returns for reassessment several problems, including new discovery of adenocarcinoma the endometrium, coronary artery disease, hypertension, hypothyroidism  Last fall she thought she wanted to have back surgery by Dr. Melvi Barone, who we suggest that she have her postmenopausal bleeding taken care of 1st. Dr Aubrey Monaco did biopsy her endometrium, showing grade 1 endometrial cancer, and she underwent a TSH, BSO by Dr. Jonathon Michelle 12/20/17. She has just begun prophylactic radiation therapy with Dr. Rhys Herbert. Overall, her attitude is good, she is getting around fair. She is glad she put off for back surgery. She is using a cane for stability. Other physicians include Dr. Matty Obrien, Dr. love for pain management, Dr. Maritza Dutta for yearly kidney follow-up    Past Medical History:   Diagnosis Date    Adenocarcinoma of endometrium, stage 1 ([address] 11/14/2017    Anemia     Arthritis     CAD (coronary artery disease)     \"Stiff Valve\"    Chronic kidney disease     Depression     Fibrocystic breast disease     5/2/17 biopsy - benign FCBD w/microcalcifications - [facility]     GERD (gastroesophageal reflux disease)     H/O vaginal bleeding     managed Dr Aubrey Monaco    Hypertension     Hypothyroidism (acquired)     Hypothyroidism (acquired)     Mixed hyperlipidemia     Obesity     Osteoarthritis     Pain of both hip joints 1/18/2016    Situational anxiety     Sleep apnea     Vitamin D deficiency       Family History   Problem Relation Age of Onset    Cancer Mother       Social History     Social History    Marital status:      Spouse name: N/A    Number of children: N/A    Years of education: N/A     Occupational History    Not on file.      Social History Main Topics    Smoking status: Never Smoker   

## 2018-03-08 ENCOUNTER — HOSPITAL ENCOUNTER (OUTPATIENT)
Dept: RADIATION ONCOLOGY | Facility: HOSPITAL | Age: 78
Setting detail: RADIATION/ONCOLOGY SERIES
Discharge: HOME | End: 2018-03-08

## 2018-03-08 PROCEDURE — 77386: CPT | Performed by: RADIOLOGY

## 2018-03-09 ENCOUNTER — HOSPITAL ENCOUNTER (OUTPATIENT)
Dept: RADIATION ONCOLOGY | Facility: HOSPITAL | Age: 78
Setting detail: RADIATION/ONCOLOGY SERIES
Discharge: HOME | End: 2018-03-09

## 2018-03-09 LAB — URINE CULTURE, ROUTINE: NORMAL

## 2018-03-09 PROCEDURE — 77386: CPT | Performed by: RADIOLOGY

## 2018-03-10 LAB — VITAMIN D 1,25-DIHYDROXY: 37.9 PG/ML (ref 19.9–79.3)

## 2018-03-12 ENCOUNTER — HOSPITAL ENCOUNTER (OUTPATIENT)
Dept: RADIATION ONCOLOGY | Facility: HOSPITAL | Age: 78
Setting detail: RADIATION/ONCOLOGY SERIES
Discharge: HOME | End: 2018-03-12

## 2018-03-12 PROCEDURE — 77386: CPT | Performed by: RADIOLOGY

## 2018-03-13 ENCOUNTER — HOSPITAL ENCOUNTER (OUTPATIENT)
Dept: RADIATION ONCOLOGY | Facility: HOSPITAL | Age: 78
Setting detail: RADIATION/ONCOLOGY SERIES
Discharge: HOME | End: 2018-03-13

## 2018-03-13 PROCEDURE — 77386: CPT | Performed by: RADIOLOGY

## 2018-03-14 ENCOUNTER — HOSPITAL ENCOUNTER (OUTPATIENT)
Dept: RADIATION ONCOLOGY | Facility: HOSPITAL | Age: 78
Setting detail: RADIATION/ONCOLOGY SERIES
Discharge: HOME | End: 2018-03-14

## 2018-03-14 PROCEDURE — 77386: CPT | Performed by: RADIOLOGY

## 2018-03-15 ENCOUNTER — HOSPITAL ENCOUNTER (OUTPATIENT)
Dept: RADIATION ONCOLOGY | Facility: HOSPITAL | Age: 78
Discharge: HOME | End: 2018-03-15

## 2018-03-15 PROCEDURE — 77386: CPT | Performed by: RADIOLOGY

## 2018-03-16 ENCOUNTER — HOSPITAL ENCOUNTER (OUTPATIENT)
Dept: RADIATION ONCOLOGY | Facility: HOSPITAL | Age: 78
Setting detail: RADIATION/ONCOLOGY SERIES
Discharge: HOME | End: 2018-03-16

## 2018-03-16 PROCEDURE — 77336 RADIATION PHYSICS CONSULT: CPT | Performed by: RADIOLOGY

## 2018-03-16 PROCEDURE — 77386: CPT | Performed by: RADIOLOGY

## 2018-03-19 ENCOUNTER — HOSPITAL ENCOUNTER (OUTPATIENT)
Dept: RADIATION ONCOLOGY | Facility: HOSPITAL | Age: 78
Setting detail: RADIATION/ONCOLOGY SERIES
Discharge: HOME | End: 2018-03-19

## 2018-03-19 PROCEDURE — 77386: CPT | Performed by: RADIOLOGY

## 2018-03-20 ENCOUNTER — HOSPITAL ENCOUNTER (OUTPATIENT)
Dept: RADIATION ONCOLOGY | Facility: HOSPITAL | Age: 78
Setting detail: RADIATION/ONCOLOGY SERIES
Discharge: HOME | End: 2018-03-20

## 2018-03-20 PROCEDURE — 77386: CPT | Performed by: RADIOLOGY

## 2018-03-21 ENCOUNTER — HOSPITAL ENCOUNTER (OUTPATIENT)
Dept: RADIATION ONCOLOGY | Facility: HOSPITAL | Age: 78
Setting detail: RADIATION/ONCOLOGY SERIES
Discharge: HOME | End: 2018-03-21

## 2018-03-21 PROCEDURE — 77386: CPT | Performed by: RADIOLOGY

## 2018-03-22 ENCOUNTER — HOSPITAL ENCOUNTER (OUTPATIENT)
Dept: RADIATION ONCOLOGY | Facility: HOSPITAL | Age: 78
Setting detail: RADIATION/ONCOLOGY SERIES
Discharge: HOME | End: 2018-03-22

## 2018-03-22 PROCEDURE — 77336 RADIATION PHYSICS CONSULT: CPT | Performed by: RADIOLOGY

## 2018-03-22 PROCEDURE — 77386: CPT | Performed by: RADIOLOGY

## 2018-03-23 ENCOUNTER — HOSPITAL ENCOUNTER (OUTPATIENT)
Dept: RADIATION ONCOLOGY | Facility: HOSPITAL | Age: 78
Setting detail: RADIATION/ONCOLOGY SERIES
Discharge: HOME | End: 2018-03-23

## 2018-03-23 PROCEDURE — 77386: CPT | Performed by: RADIOLOGY

## 2018-03-26 ENCOUNTER — HOSPITAL ENCOUNTER (OUTPATIENT)
Dept: RADIATION ONCOLOGY | Facility: HOSPITAL | Age: 78
Setting detail: RADIATION/ONCOLOGY SERIES
Discharge: HOME | End: 2018-03-26

## 2018-03-26 PROCEDURE — 77386: CPT | Performed by: RADIOLOGY

## 2018-03-26 RX ORDER — LANSOPRAZOLE 30 MG/1
CAPSULE, DELAYED RELEASE ORAL
Qty: 90 CAPSULE | Refills: 3 | Status: SHIPPED | OUTPATIENT
Start: 2018-03-26 | End: 2019-09-23

## 2018-03-26 RX ORDER — IRBESARTAN 300 MG/1
TABLET ORAL
Qty: 90 TABLET | Refills: 3 | Status: SHIPPED | OUTPATIENT
Start: 2018-03-26 | End: 2018-06-12 | Stop reason: SDUPTHER

## 2018-03-26 RX ORDER — ESCITALOPRAM OXALATE 20 MG/1
TABLET ORAL
Qty: 90 TABLET | Refills: 3 | Status: SHIPPED | OUTPATIENT
Start: 2018-03-26 | End: 2019-04-17

## 2018-03-27 ENCOUNTER — HOSPITAL ENCOUNTER (OUTPATIENT)
Dept: RADIATION ONCOLOGY | Facility: HOSPITAL | Age: 78
Setting detail: RADIATION/ONCOLOGY SERIES
Discharge: HOME | End: 2018-03-27

## 2018-03-27 PROCEDURE — 77386: CPT | Performed by: RADIOLOGY

## 2018-03-28 ENCOUNTER — HOSPITAL ENCOUNTER (OUTPATIENT)
Dept: RADIATION ONCOLOGY | Facility: HOSPITAL | Age: 78
Setting detail: RADIATION/ONCOLOGY SERIES
Discharge: HOME | End: 2018-03-28

## 2018-03-28 PROCEDURE — 77386: CPT | Performed by: RADIOLOGY

## 2018-03-28 PROCEDURE — 77300 RADIATION THERAPY DOSE PLAN: CPT | Performed by: RADIOLOGY

## 2018-03-29 ENCOUNTER — HOSPITAL ENCOUNTER (OUTPATIENT)
Dept: RADIATION ONCOLOGY | Facility: HOSPITAL | Age: 78
Setting detail: RADIATION/ONCOLOGY SERIES
Discharge: HOME | End: 2018-03-29

## 2018-03-29 PROCEDURE — 77386: CPT | Performed by: RADIOLOGY

## 2018-03-29 PROCEDURE — 77336 RADIATION PHYSICS CONSULT: CPT | Performed by: RADIOLOGY

## 2018-03-30 ENCOUNTER — HOSPITAL ENCOUNTER (OUTPATIENT)
Dept: RADIATION ONCOLOGY | Facility: HOSPITAL | Age: 78
Setting detail: RADIATION/ONCOLOGY SERIES
Discharge: HOME | End: 2018-03-30

## 2018-03-30 PROCEDURE — 77386: CPT | Performed by: RADIOLOGY

## 2018-03-30 RX ORDER — OXYCODONE HYDROCHLORIDE 15 MG/1
15 TABLET ORAL EVERY 4 HOURS PRN
Qty: 180 TABLET | Refills: 0 | Status: SHIPPED | OUTPATIENT
Start: 2018-04-03 | End: 2018-04-25 | Stop reason: SDUPTHER

## 2018-04-02 ENCOUNTER — HOSPITAL ENCOUNTER (OUTPATIENT)
Dept: RADIATION ONCOLOGY | Facility: HOSPITAL | Age: 78
Setting detail: RADIATION/ONCOLOGY SERIES
Discharge: HOME | End: 2018-04-02

## 2018-04-02 PROCEDURE — 77386: CPT | Performed by: RADIOLOGY

## 2018-04-03 ENCOUNTER — HOSPITAL ENCOUNTER (OUTPATIENT)
Dept: RADIATION ONCOLOGY | Facility: HOSPITAL | Age: 78
Setting detail: RADIATION/ONCOLOGY SERIES
Discharge: HOME | End: 2018-04-03

## 2018-04-03 PROCEDURE — 77386: CPT | Performed by: RADIOLOGY

## 2018-04-04 ENCOUNTER — HOSPITAL ENCOUNTER (OUTPATIENT)
Dept: RADIATION ONCOLOGY | Facility: HOSPITAL | Age: 78
Setting detail: RADIATION/ONCOLOGY SERIES
Discharge: HOME | End: 2018-04-04

## 2018-04-04 DIAGNOSIS — Z87.42 H/O VAGINAL BLEEDING: ICD-10-CM

## 2018-04-04 DIAGNOSIS — G89.4 CHRONIC PAIN SYNDROME: ICD-10-CM

## 2018-04-04 DIAGNOSIS — N18.30 CHRONIC KIDNEY DISEASE, STAGE 3 (MODERATE): ICD-10-CM

## 2018-04-04 DIAGNOSIS — M48.061 SPINAL STENOSIS AT L4-L5 LEVEL: Chronic | ICD-10-CM

## 2018-04-04 DIAGNOSIS — I10 ESSENTIAL HYPERTENSION: ICD-10-CM

## 2018-04-04 PROCEDURE — 77386: CPT | Performed by: RADIOLOGY

## 2018-04-05 ENCOUNTER — HOSPITAL ENCOUNTER (OUTPATIENT)
Dept: RADIATION ONCOLOGY | Facility: HOSPITAL | Age: 78
Setting detail: RADIATION/ONCOLOGY SERIES
Discharge: HOME | End: 2018-04-05

## 2018-04-05 ENCOUNTER — HOSPITAL ENCOUNTER (OUTPATIENT)
Dept: RADIATION ONCOLOGY | Facility: HOSPITAL | Age: 78
Setting detail: RADIATION/ONCOLOGY SERIES
End: 2018-04-05

## 2018-04-05 PROCEDURE — 77336 RADIATION PHYSICS CONSULT: CPT | Performed by: RADIOLOGY

## 2018-04-05 PROCEDURE — 77386: CPT | Performed by: RADIOLOGY

## 2018-04-06 ENCOUNTER — HOSPITAL ENCOUNTER (OUTPATIENT)
Dept: RADIATION ONCOLOGY | Facility: HOSPITAL | Age: 78
Setting detail: RADIATION/ONCOLOGY SERIES
Discharge: HOME | End: 2018-04-06

## 2018-04-06 PROCEDURE — 77386: CPT | Performed by: RADIOLOGY

## 2018-04-09 ENCOUNTER — HOSPITAL ENCOUNTER (OUTPATIENT)
Dept: RADIATION ONCOLOGY | Facility: HOSPITAL | Age: 78
Setting detail: RADIATION/ONCOLOGY SERIES
Discharge: HOME | End: 2018-04-09

## 2018-04-09 PROCEDURE — 77386: CPT | Performed by: RADIOLOGY

## 2018-04-10 ENCOUNTER — HOSPITAL ENCOUNTER (OUTPATIENT)
Dept: RADIATION ONCOLOGY | Facility: HOSPITAL | Age: 78
Setting detail: RADIATION/ONCOLOGY SERIES
Discharge: HOME | End: 2018-04-10

## 2018-04-10 PROCEDURE — 77386: CPT | Performed by: RADIOLOGY

## 2018-04-11 ENCOUNTER — HOSPITAL ENCOUNTER (OUTPATIENT)
Dept: RADIATION ONCOLOGY | Facility: HOSPITAL | Age: 78
Setting detail: RADIATION/ONCOLOGY SERIES
Discharge: HOME | End: 2018-04-11

## 2018-04-11 PROCEDURE — 77386: CPT | Performed by: RADIOLOGY

## 2018-04-26 RX ORDER — OXYCODONE HYDROCHLORIDE 15 MG/1
15 TABLET ORAL EVERY 4 HOURS PRN
Qty: 180 TABLET | Refills: 0 | Status: SHIPPED | OUTPATIENT
Start: 2018-05-03 | End: 2018-05-29 | Stop reason: SDUPTHER

## 2018-05-01 RX ORDER — SUMATRIPTAN 50 MG/1
50 TABLET, FILM COATED ORAL 2 TIMES DAILY PRN
Qty: 60 TABLET | Refills: 0 | Status: SHIPPED | OUTPATIENT
Start: 2018-05-01 | End: 2018-06-25 | Stop reason: SDUPTHER

## 2018-05-03 ENCOUNTER — HOSPITAL ENCOUNTER (OUTPATIENT)
Dept: RADIATION ONCOLOGY | Facility: HOSPITAL | Age: 78
Setting detail: RADIATION/ONCOLOGY SERIES
End: 2018-05-03

## 2018-05-14 DIAGNOSIS — M48.061 SPINAL STENOSIS AT L4-L5 LEVEL: Chronic | ICD-10-CM

## 2018-05-14 DIAGNOSIS — G89.4 CHRONIC PAIN SYNDROME: ICD-10-CM

## 2018-05-14 DIAGNOSIS — I10 ESSENTIAL HYPERTENSION: ICD-10-CM

## 2018-05-14 DIAGNOSIS — Z87.42 H/O VAGINAL BLEEDING: ICD-10-CM

## 2018-05-14 DIAGNOSIS — N18.30 CHRONIC KIDNEY DISEASE, STAGE 3 (MODERATE): ICD-10-CM

## 2018-05-14 RX ORDER — CELECOXIB 100 MG/1
CAPSULE ORAL
Qty: 50 CAPSULE | Refills: 2 | Status: SHIPPED | OUTPATIENT
Start: 2018-05-14 | End: 2018-10-15 | Stop reason: SDUPTHER

## 2018-05-24 ENCOUNTER — TELEPHONE (OUTPATIENT)
Dept: PAIN MANAGEMENT | Age: 78
End: 2018-05-24

## 2018-05-29 ENCOUNTER — HOSPITAL ENCOUNTER (OUTPATIENT)
Dept: PAIN MANAGEMENT | Age: 78
Discharge: HOME | End: 2018-05-29
Payer: MEDICARE

## 2018-05-29 VITALS
BODY MASS INDEX: 37.17 KG/M2 | RESPIRATION RATE: 20 BRPM | DIASTOLIC BLOOD PRESSURE: 70 MMHG | OXYGEN SATURATION: 100 % | TEMPERATURE: 97 F | HEIGHT: 62 IN | HEART RATE: 81 BPM | SYSTOLIC BLOOD PRESSURE: 140 MMHG | WEIGHT: 202 LBS

## 2018-05-29 DIAGNOSIS — I10 ESSENTIAL HYPERTENSION: ICD-10-CM

## 2018-05-29 DIAGNOSIS — M48.061 SPINAL STENOSIS AT L4-L5 LEVEL: Chronic | ICD-10-CM

## 2018-05-29 DIAGNOSIS — N18.30 CHRONIC KIDNEY DISEASE, STAGE 3 (MODERATE): ICD-10-CM

## 2018-05-29 DIAGNOSIS — G89.4 CHRONIC PAIN SYNDROME: ICD-10-CM

## 2018-05-29 DIAGNOSIS — M47.816 LUMBAR FACET ARTHROPATHY: Chronic | ICD-10-CM

## 2018-05-29 DIAGNOSIS — M47.816 LUMBAR FACET JOINT SYNDROME: ICD-10-CM

## 2018-05-29 DIAGNOSIS — Z87.42 H/O VAGINAL BLEEDING: ICD-10-CM

## 2018-05-29 PROCEDURE — 6360000002 HC RX W HCPCS

## 2018-05-29 PROCEDURE — 64494 INJ PARAVERT F JNT L/S 2 LEV: CPT

## 2018-05-29 PROCEDURE — 99152 MOD SED SAME PHYS/QHP 5/>YRS: CPT

## 2018-05-29 PROCEDURE — 64493 INJ PARAVERT F JNT L/S 1 LEV: CPT

## 2018-05-29 PROCEDURE — 2500000003 HC RX 250 WO HCPCS

## 2018-05-29 PROCEDURE — 3209999900 FLUORO FOR SURGICAL PROCEDURES

## 2018-05-29 PROCEDURE — G0260 INJ FOR SACROILIAC JT ANESTH: HCPCS

## 2018-05-29 RX ORDER — MIDAZOLAM HYDROCHLORIDE 1 MG/ML
INJECTION INTRAMUSCULAR; INTRAVENOUS
Status: COMPLETED | OUTPATIENT
Start: 2018-05-29 | End: 2018-05-29

## 2018-05-29 RX ORDER — TRIAMCINOLONE ACETONIDE 40 MG/ML
INJECTION, SUSPENSION INTRA-ARTICULAR; INTRAMUSCULAR
Status: COMPLETED | OUTPATIENT
Start: 2018-05-29 | End: 2018-05-29

## 2018-05-29 RX ORDER — OXYCODONE HYDROCHLORIDE 15 MG/1
15 TABLET ORAL EVERY 4 HOURS PRN
Qty: 180 TABLET | Refills: 0 | Status: SHIPPED | OUTPATIENT
Start: 2018-06-02 | End: 2018-06-28 | Stop reason: SDUPTHER

## 2018-05-29 RX ORDER — BUPIVACAINE HYDROCHLORIDE 5 MG/ML
INJECTION, SOLUTION EPIDURAL; INTRACAUDAL
Status: COMPLETED | OUTPATIENT
Start: 2018-05-29 | End: 2018-05-29

## 2018-05-29 RX ORDER — CYCLOBENZAPRINE HCL 10 MG
10 TABLET ORAL 2 TIMES DAILY PRN
Qty: 180 TABLET | Refills: 0 | Status: SHIPPED | OUTPATIENT
Start: 2018-05-29 | End: 2018-12-24 | Stop reason: SDUPTHER

## 2018-05-29 RX ADMIN — TRIAMCINOLONE ACETONIDE 2 MG: 40 INJECTION, SUSPENSION INTRA-ARTICULAR; INTRAMUSCULAR at 15:22

## 2018-05-29 RX ADMIN — MIDAZOLAM HYDROCHLORIDE 1 MG: 1 INJECTION INTRAMUSCULAR; INTRAVENOUS at 15:14

## 2018-05-29 RX ADMIN — MIDAZOLAM HYDROCHLORIDE 1 MG: 1 INJECTION INTRAMUSCULAR; INTRAVENOUS at 15:21

## 2018-05-29 RX ADMIN — TRIAMCINOLONE ACETONIDE 20 MG: 40 INJECTION, SUSPENSION INTRA-ARTICULAR; INTRAMUSCULAR at 15:23

## 2018-05-29 RX ADMIN — BUPIVACAINE HYDROCHLORIDE 0.5 ML: 5 INJECTION, SOLUTION EPIDURAL; INTRACAUDAL at 15:22

## 2018-05-29 RX ADMIN — BUPIVACAINE HYDROCHLORIDE 4.5 ML: 5 INJECTION, SOLUTION EPIDURAL; INTRACAUDAL at 15:23

## 2018-05-29 RX ADMIN — MIDAZOLAM HYDROCHLORIDE 1 MG: 1 INJECTION INTRAMUSCULAR; INTRAVENOUS at 15:11

## 2018-05-29 ASSESSMENT — PAIN DESCRIPTION - ORIENTATION: ORIENTATION: LOWER;RIGHT;LEFT

## 2018-05-29 ASSESSMENT — PAIN DESCRIPTION - LOCATION: LOCATION: BACK;BUTTOCKS;LEG

## 2018-05-29 ASSESSMENT — PAIN DESCRIPTION - PROGRESSION: CLINICAL_PROGRESSION: NOT CHANGED

## 2018-05-29 ASSESSMENT — PAIN DESCRIPTION - PAIN TYPE: TYPE: CHRONIC PAIN

## 2018-05-29 ASSESSMENT — PAIN DESCRIPTION - DESCRIPTORS: DESCRIPTORS: ACHING;RADIATING;THROBBING

## 2018-05-29 ASSESSMENT — PAIN DESCRIPTION - FREQUENCY: FREQUENCY: CONTINUOUS

## 2018-05-29 ASSESSMENT — PAIN SCALES - GENERAL: PAINLEVEL_OUTOF10: 9

## 2018-05-29 ASSESSMENT — PAIN DESCRIPTION - ONSET: ONSET: ON-GOING

## 2018-05-31 ENCOUNTER — HOSPITAL ENCOUNTER (OUTPATIENT)
Dept: RADIATION ONCOLOGY | Facility: HOSPITAL | Age: 78
Setting detail: RADIATION/ONCOLOGY SERIES
End: 2018-05-31

## 2018-05-31 ENCOUNTER — OFFICE VISIT (OUTPATIENT)
Dept: RADIATION ONCOLOGY | Facility: HOSPITAL | Age: 78
End: 2018-05-31

## 2018-05-31 VITALS
SYSTOLIC BLOOD PRESSURE: 148 MMHG | WEIGHT: 198 LBS | DIASTOLIC BLOOD PRESSURE: 78 MMHG | HEIGHT: 62 IN | BODY MASS INDEX: 36.44 KG/M2

## 2018-05-31 DIAGNOSIS — Z78.9 NON-SMOKER: ICD-10-CM

## 2018-05-31 DIAGNOSIS — Z90.721 S/P HYSTERECTOMY WITH OOPHORECTOMY: ICD-10-CM

## 2018-05-31 DIAGNOSIS — C54.1 ADENOCARCINOMA OF ENDOMETRIUM (HCC): Primary | ICD-10-CM

## 2018-05-31 DIAGNOSIS — Z08 ENCOUNTER FOR FOLLOW-UP SURVEILLANCE OF ENDOMETRIAL CANCER: ICD-10-CM

## 2018-05-31 DIAGNOSIS — Z92.3 HISTORY OF RADIATION THERAPY: ICD-10-CM

## 2018-05-31 DIAGNOSIS — Z85.42 ENCOUNTER FOR FOLLOW-UP SURVEILLANCE OF ENDOMETRIAL CANCER: ICD-10-CM

## 2018-05-31 DIAGNOSIS — Z90.710 S/P HYSTERECTOMY WITH OOPHORECTOMY: ICD-10-CM

## 2018-05-31 PROCEDURE — G0463 HOSPITAL OUTPT CLINIC VISIT: HCPCS | Performed by: RADIOLOGY

## 2018-05-31 NOTE — PROGRESS NOTES
[facility]  MD Thais Abad BSN, PA-C  ____________________________________________________________  [facility]  Department of Radiation Oncology  [address] [place] [id number]  Office:  [phone]  Fax: [phone]    DATE:   05/31/2018    PATIENT:   Jennifer Gomes   1940                                 MEDICAL RECORD #:  [id number]    Reason for Follow up Visit:  Adenocarcinoma of endometrium [C54.1]    BRIEF HISTORY:   Jennifer Gomes is a very pleasant 77 y.o. patient that is status post ANAYA/BSO and pelvic external beam radiation therapy for Stage II (T2, cN0, cM0) endometrial carcinoma with completion on 04/11/2018. She returns to the clinic today for inital follow up exam.  is doing well with no new significant treatment or disease related complaints. Reports fatigue, chronic back pain, and chronic headaches. Denies activity change, appetite change, unexpected weight change, vaginal bleeding/discharge, and abdominal pain. Last appointment with  was on 02/06/2018. No follow up appointment scheduled at this time.     10/17/2017 - Presented for PMB to Dr. Madrigal-    • Ultrasound showed 7 x 3.8 x 4.6 cm uterus with.endometrial polyp 2.9cm, shows blood flow  -Endometrium 27mm    11/06/2017 - Hysteroscopy/D&C:  Endometrium, curettage:  • Endometrioid adenocarcinoma of the endometrium with foci of squamous metaplasia, grade 1.  • Fragments of smooth muscle, negative for malignancy.  • Fragments of unremarkable squamous mucosa.    11/17/2017 - Consultation with Dr Miller- [facility]  • Recommended robot assisted TLH/BSO    12/20/2017 - Robotic-assisted TLH-BSO with sentinel node mapping and pelvic lymphadenectomy- Dr Miller- [place]:  • Adenocarcinoma of endometrium, endometrioid type, FIGO grade 2  • Myometrial invasion - present 88% (14 mm invasion in 16mm thick myometrium  • Local extent - Upper endocervical  canal with stromal invasion  • Lymphovascular invasion suspected  • Fallopian tubes and ovaries - fibrovascular adhesions, otherwise no pathologic abnormality  • Left external iliac sentinel lymph node - 6 benign  • Right external iliac sentinel lymph node - benign fibroadipose tissue; no lymph node tissue received    02/06/2018 - Appointment with :  • Recommended whole pelvic radiation.   • Referred to Radiation oncology in [facility].     02/15/2018 - Consult with :  • I am recommending radiation therapy, which will consist of 28 treatment fractions for total of 5040 cGy.    03/05/2018 - 04/11/2018 - Completed Radiation course:  • Received 5040 cGy in 28 fractions to pelvis via external beam radiation therapy.    History obtained from  PATIENT, FAMILY and CHART    PAST MEDICAL HISTORY   Past Medical History:   Diagnosis Date   • Anxiety    • Arthritis    • Chronic pain    • Depression    • Disease of thyroid gland    • Fibromyalgia    • Headache    • Hyperlipidemia    • Hypertension    • Incontinence    • Insomnia    • Leg pain    • Lumbar stenosis    • Peptic ulcer    • Restless legs    • Vaginal bleeding       PAST SURGICAL HISTORY   Past Surgical History:   Procedure Laterality Date   • BLADDER REPAIR  2011    MESH HAD TO BE REMOVED IN 2013   • BREAST CYST EXCISION     • CARPAL TUNNEL RELEASE     • CATARACT EXTRACTION W/ INTRAOCULAR LENS  IMPLANT, BILATERAL     • CYSTECTOMY     • D&C HYSTEROSCOPY N/A 11/6/2017    Procedure: DILATATION AND CURETTAGE HYSTEROSCOPY;  Surgeon: Shasta Madrigal MD;  Location: [facility] OR;  Service:    • DILATION AND CURETTAGE, DIAGNOSTIC / THERAPEUTIC  2008   • ENDOSCOPY  09/23/2010    Short segment of Arriola's,Moderate chroninc esophagogastritis and negative H.pylori   • ENDOSCOPY N/A 9/25/2017    Procedure: ESOPHAGOGASTRODUODENOSCOPY WITH ANESTHESIA;  Surgeon: Tom Velasco DO;  Location: [facility] ENDOSCOPY;  Service:    • HYSTERECTOMY  12/20/2017   • ORIF  TIBIA/FIBULA FRACTURES Left 2000   • TRANSVAGINAL TAPING SUSPENSION N/A 11/6/2017    Procedure: VAGINAL MESH REVISION;  Surgeon: Shasta Madrigal MD;  Location: [facility];  Service:    • VAGINAL MESH REVISION  2013      SOCIAL HISTORY   Social History   Substance Use Topics   • Smoking status: Never Smoker   • Smokeless tobacco: Never Used   • Alcohol use No      ALLERGIES  Ropinirole hcl; Codeine; Ambien [zolpidem]; Eszopiclone; Pregabalin; and Tizanidine     MEDICATIONS   Current Outpatient Prescriptions   Medication Sig Dispense Refill   • APAP-isometheptene-dichloral -325 MG per capsule Take 1 capsule by mouth 4 (Four) Times a Day As Needed for Headache.     • atorvastatin (LIPITOR) 40 MG tablet Take 40 mg by mouth Daily.     • butalbital-aspirin-caffeine (FIORINAL) -40 MG capsule Take 1 capsule by mouth Every 4 (Four) Hours As Needed for Headache.     • carbidopa-levodopa (SINEMET)  MG per tablet Take 1 tablet by mouth 4 (Four) Times a Day.     • carvedilol (COREG) 6.25 MG tablet TAKE 1 TABLET TWICE A DAY     • diltiazem XR (DILACOR XR) 240 MG 24 hr capsule Take 240 mg by mouth Every Night.     • FIBER FORMULA capsule Take 2 tablets by mouth Daily.     • irbesartan (AVAPRO) 300 MG tablet Take 300 mg by mouth Daily.     • lansoprazole (PREVACID) 30 MG capsule Take 30 mg by mouth Daily.     • levothyroxine (SYNTHROID, LEVOTHROID) 50 MCG tablet 1 tablet daily     • oxyCODONE (ROXICODONE) 10 MG tablet Take 15 mg by mouth Every 4 (Four) Hours As Needed for Moderate Pain  (GENERAL PAIN, HIPS). DR GUILHERME TENA HAS BEEN ON FOR14 YEARS     • SUMAtriptan (IMITREX) 50 MG tablet Take 50 mg by mouth 1 (One) Time As Needed for Migraine.     • vitamin D (ERGOCALCIFEROL) 01707 units capsule capsule Take 50,000 Units by mouth 1 (One) Time Per Week.       No current facility-administered medications for this visit.       The following portions of the patient's history were reviewed and updated as appropriate:  "allergies, current medications, past family history, past medical history, past social history, past surgical history and problem list.    REVIEW OF SYSTEMS  Review of Systems   Constitutional: Positive for fatigue. Negative for activity change, appetite change, chills, diaphoresis, fever and unexpected weight change.   HENT: Negative.    Eyes: Negative.    Respiratory: Negative.    Cardiovascular: Negative.    Gastrointestinal: Negative.    Endocrine: Negative.    Genitourinary: Negative.    Musculoskeletal: Negative.         Chronic back pain   Skin: Negative.    Allergic/Immunologic: Negative.    Neurological: Negative.         Chronic headaches   Hematological: Negative.    Psychiatric/Behavioral: Negative.      PHYSICAL EXAM  VITAL SIGNS:   Vitals:    05/31/18 1402   BP: 148/78   Weight: 89.8 kg (198 lb)   Height: 157.5 cm (62\")   PainSc: 0-No pain  Comment: Chronic back pain     General: Alert, cooperative, no acute distress, appears stated age. Vitals reviewed.  Head/Neck:  Normocephalic, without obvious abnormality, atraumatic. The oropharynx is clear. The mouth and throat are clear. The trachea is midline. Neck is supple without evidence of jugular venous distention or cervical adenopathy.   Eyes: The pupils are equal, round, and reactive, no scleral jaundice or erythema.    Ears: Ears appear intact with no abnormalities noted, hearing grossly normal  Chest:  Respiratory efforts are normal and unlabored. The chest is clear to auscultation and percussion. There are no wheezes, rhonchi, rales, or asymmetry of breath sounds.  Cardiovascular: The patient has a regular cardiac rate and rhythm without murmurs, rubs, or gallops. The peripheral pulses are equal and full.  Abdomen:  Soft and Non-Tender- Normal bowel sounds noted.  Genitalia/pelvic exam: Deferred  Pelvic: Deferred- 6 weeks post radiation  Extremities:  WHITESIDE well, no evidence of cyanosis, clubbing, or edema.  Lymphatics:   There is no evidence of " adenopathy in the cervical, supraclavicular, axillary, inguinal, or femoral areas. There is no splenomegaly.  Skin: No bleeding, bruising or rash  Neurologic:  alert, oriented, cooperative, and pleasant.  He is appropriately conversant. Moves his extremities to command with no obvious focal motor deficits   Psych: Mood and affect are appropriate for circumstance. Eye contact is appropriate.      Performance Status: ECOG (2) Ambulatory and capable of self care, unable to carry out work activity, up and about > 50% or waking hours    Clinical Quality Measures  -Pain Documented  Pain severity quantified; no pain present, no followup plan required.0   -Advanced Care Planning Care plan discussed, no care plan provided  -Body Mass Index Screening and Follow-Up Plan Body mass index is 36.21 kg/m². Patient's Body mass index is 36.21 kg/m². BMI is above normal parameters. Recommendations include: educational material.  -Pneumonia Vaccine: Received Injection?  YES- I am recommending patient to see PCP for annual wellness exam   -Tobacco Use: Screening and Cessation Intervention Smoking status: Never Smoker                                                              Smokeless tobacco: Never Used                        ASSESSMENT AND PLAN   1. Adenocarcinoma of endometrium    2. S/P hysterectomy with oophorectomy    3. History of radiation therapy - completed on 04/11/2018 to the pelvis    4. Non-smoker    5. Encounter for follow-up surveillance of endometrial cancer      Orders Placed This Encounter   Procedures   • Ambulatory Referral to Gynecology     RECOMMENDATIONS:  Jennifer Gomes presents to our clinic today after completion of radiation therapy for Stage II (T2, cN0, cM0) endometrial carcinoma. Patient is without symptoms or evidence for recurrent or metastatic disease at this time and appears to be recovering well from recent treatment.. Schedule follow up appointment with  for GYN surveillance. Counseled and  nurses instructed pt regarding use of vaginal dilator. Return to clinic for follow up appointment in 3 months or sooner if needed.      Patient Instructions   Use vaginal dilator. Schedule appt with Dr. Rohan Cazares appointment time was spent in counseling and coordination of care as follows: diagnosis, intent of treatment discussing radiation therapy specifics: logistics, possible and probable side effects and after effects, staging of cancer, standard of care in for this stage of this cancer and treatment/surveillance options.  I saw this patient in follow-up will covering for Dr. Ta Florence, radiation oncologist.  Thais Yap PA-C  05/31/2018

## 2018-06-01 DIAGNOSIS — R51.9 PERSISTENT HEADACHES: Primary | ICD-10-CM

## 2018-06-01 RX ORDER — BUTALBITAL, ASPIRIN, AND CAFFEINE 325; 50; 40 MG/1; MG/1; MG/1
1 CAPSULE ORAL EVERY 6 HOURS PRN
Qty: 40 CAPSULE | Refills: 2 | OUTPATIENT
Start: 2018-06-01 | End: 2019-04-17

## 2018-06-12 ENCOUNTER — OFFICE VISIT (OUTPATIENT)
Dept: INTERNAL MEDICINE | Age: 78
End: 2018-06-12
Payer: MEDICARE

## 2018-06-12 VITALS
DIASTOLIC BLOOD PRESSURE: 74 MMHG | OXYGEN SATURATION: 97 % | SYSTOLIC BLOOD PRESSURE: 122 MMHG | HEIGHT: 62 IN | BODY MASS INDEX: 36.07 KG/M2 | WEIGHT: 196 LBS | RESPIRATION RATE: 16 BRPM | HEART RATE: 59 BPM

## 2018-06-12 DIAGNOSIS — E03.9 HYPOTHYROIDISM (ACQUIRED): ICD-10-CM

## 2018-06-12 DIAGNOSIS — E55.9 VITAMIN D DEFICIENCY: ICD-10-CM

## 2018-06-12 DIAGNOSIS — G89.4 CHRONIC PAIN SYNDROME: ICD-10-CM

## 2018-06-12 DIAGNOSIS — N18.30 STAGE 3 CHRONIC KIDNEY DISEASE (HCC): ICD-10-CM

## 2018-06-12 DIAGNOSIS — I10 ESSENTIAL HYPERTENSION: Primary | ICD-10-CM

## 2018-06-12 DIAGNOSIS — E78.2 MIXED HYPERLIPIDEMIA: ICD-10-CM

## 2018-06-12 DIAGNOSIS — M19.90 OSTEOARTHRITIS, UNSPECIFIED OSTEOARTHRITIS TYPE, UNSPECIFIED SITE: ICD-10-CM

## 2018-06-12 PROCEDURE — G8417 CALC BMI ABV UP PARAM F/U: HCPCS | Performed by: NURSE PRACTITIONER

## 2018-06-12 PROCEDURE — 99214 OFFICE O/P EST MOD 30 MIN: CPT | Performed by: NURSE PRACTITIONER

## 2018-06-12 PROCEDURE — 4040F PNEUMOC VAC/ADMIN/RCVD: CPT | Performed by: NURSE PRACTITIONER

## 2018-06-12 PROCEDURE — 1090F PRES/ABSN URINE INCON ASSESS: CPT | Performed by: NURSE PRACTITIONER

## 2018-06-12 PROCEDURE — 1036F TOBACCO NON-USER: CPT | Performed by: NURSE PRACTITIONER

## 2018-06-12 PROCEDURE — 3017F COLORECTAL CA SCREEN DOC REV: CPT | Performed by: NURSE PRACTITIONER

## 2018-06-12 PROCEDURE — 1123F ACP DISCUSS/DSCN MKR DOCD: CPT | Performed by: NURSE PRACTITIONER

## 2018-06-12 PROCEDURE — G8400 PT W/DXA NO RESULTS DOC: HCPCS | Performed by: NURSE PRACTITIONER

## 2018-06-12 PROCEDURE — G8427 DOCREV CUR MEDS BY ELIG CLIN: HCPCS | Performed by: NURSE PRACTITIONER

## 2018-06-12 RX ORDER — LEVOTHYROXINE SODIUM 0.05 MG/1
50 TABLET ORAL DAILY
Qty: 90 TABLET | Refills: 3 | Status: SHIPPED | OUTPATIENT
Start: 2018-06-12 | End: 2019-03-11 | Stop reason: SDUPTHER

## 2018-06-12 RX ORDER — IRBESARTAN 300 MG/1
TABLET ORAL
Qty: 30 TABLET | Refills: 0 | Status: SHIPPED | OUTPATIENT
Start: 2018-06-12 | End: 2018-06-13 | Stop reason: CLARIF

## 2018-06-12 RX ORDER — IRBESARTAN 300 MG/1
TABLET ORAL
Qty: 90 TABLET | Refills: 3 | Status: SHIPPED | OUTPATIENT
Start: 2018-06-12 | End: 2018-06-13 | Stop reason: SDUPTHER

## 2018-06-12 RX ORDER — LEVOTHYROXINE SODIUM 0.05 MG/1
50 TABLET ORAL DAILY
Qty: 30 TABLET | Refills: 0 | Status: SHIPPED | OUTPATIENT
Start: 2018-06-12 | End: 2019-09-23 | Stop reason: SDUPTHER

## 2018-06-12 ASSESSMENT — ENCOUNTER SYMPTOMS
STRIDOR: 0
TROUBLE SWALLOWING: 0
SORE THROAT: 0
COUGH: 0
VOMITING: 0
EYE ITCHING: 0
ABDOMINAL DISTENTION: 0
DIARRHEA: 0
BACK PAIN: 1
WHEEZING: 0
CONSTIPATION: 0
NAUSEA: 0
EYE DISCHARGE: 0
CHOKING: 0
SHORTNESS OF BREATH: 0
COLOR CHANGE: 0
BLOOD IN STOOL: 0
ABDOMINAL PAIN: 0

## 2018-06-13 RX ORDER — IRBESARTAN 150 MG/1
TABLET ORAL
Qty: 14 TABLET | Refills: 0 | Status: SHIPPED | OUTPATIENT
Start: 2018-06-13 | End: 2019-03-18 | Stop reason: SDUPTHER

## 2018-06-13 RX ORDER — IRBESARTAN 150 MG/1
TABLET ORAL
Qty: 90 TABLET | Refills: 3 | Status: SHIPPED | OUTPATIENT
Start: 2018-06-13 | End: 2018-06-13 | Stop reason: SDUPTHER

## 2018-06-25 DIAGNOSIS — R51.9 NONINTRACTABLE HEADACHE, UNSPECIFIED CHRONICITY PATTERN, UNSPECIFIED HEADACHE TYPE: Primary | ICD-10-CM

## 2018-06-26 RX ORDER — SUMATRIPTAN 50 MG/1
50 TABLET, FILM COATED ORAL 2 TIMES DAILY PRN
Qty: 60 TABLET | Refills: 0 | Status: SHIPPED | OUTPATIENT
Start: 2018-06-26 | End: 2018-12-24 | Stop reason: SDUPTHER

## 2018-06-28 DIAGNOSIS — M47.816 LUMBAR FACET JOINT SYNDROME: Primary | ICD-10-CM

## 2018-06-28 RX ORDER — OXYCODONE HYDROCHLORIDE 15 MG/1
15 TABLET ORAL EVERY 4 HOURS PRN
Qty: 180 TABLET | Refills: 0 | Status: SHIPPED | OUTPATIENT
Start: 2018-07-02 | End: 2019-04-17 | Stop reason: SINTOL

## 2018-07-10 ENCOUNTER — OFFICE VISIT (OUTPATIENT)
Dept: OBSTETRICS AND GYNECOLOGY | Facility: CLINIC | Age: 78
End: 2018-07-10

## 2018-07-10 VITALS
BODY MASS INDEX: 37.91 KG/M2 | HEIGHT: 62 IN | DIASTOLIC BLOOD PRESSURE: 60 MMHG | SYSTOLIC BLOOD PRESSURE: 115 MMHG | WEIGHT: 206 LBS

## 2018-07-10 DIAGNOSIS — C54.1 ADENOCARCINOMA OF ENDOMETRIUM (HCC): ICD-10-CM

## 2018-07-10 DIAGNOSIS — Z78.9 NON-SMOKER: ICD-10-CM

## 2018-07-10 DIAGNOSIS — Z13.820 OSTEOPOROSIS SCREENING: Primary | ICD-10-CM

## 2018-07-10 DIAGNOSIS — Z92.3 HISTORY OF RADIATION THERAPY: ICD-10-CM

## 2018-07-10 DIAGNOSIS — Z90.710 S/P HYSTERECTOMY WITH OOPHORECTOMY: ICD-10-CM

## 2018-07-10 DIAGNOSIS — M48.061 SPINAL STENOSIS, LUMBAR REGION, WITHOUT NEUROGENIC CLAUDICATION: ICD-10-CM

## 2018-07-10 DIAGNOSIS — Z12.31 ENCOUNTER FOR SCREENING MAMMOGRAM FOR BREAST CANCER: ICD-10-CM

## 2018-07-10 DIAGNOSIS — Z90.721 S/P HYSTERECTOMY WITH OOPHORECTOMY: ICD-10-CM

## 2018-07-10 PROCEDURE — 99214 OFFICE O/P EST MOD 30 MIN: CPT | Performed by: OBSTETRICS & GYNECOLOGY

## 2018-07-10 RX ORDER — CELECOXIB 100 MG/1
CAPSULE ORAL
COMMUNITY
Start: 2018-05-14 | End: 2019-04-11

## 2018-07-10 NOTE — PROGRESS NOTES
"Subjective   Chief Complaint   Patient presents with   • Follow-up     pt here today for follow up. pt here to discuss when she needs to be seen for further care. pt voices not feeling well the past couple of days. pt voices no other concerns.     Jennifer Gomes is a 77 y.o. year old  menopausal female presenting to be seen for her annual exam.  The patient is status-post hysterectomy in 2017 with Dr. Miller for endometrial cancer.  She has already completed her pelvic radiation treatments (got done in 2018).  The patient was given a vaginal dilator by Dr. Miller to prevent stenosis and enable continued surveillance.  She thought that was too \"awkward\", so she never used it; now she is worried about exam being uncomfortable.  Hot flashes and night sweats are occasionally a significant problem, but she attributes them to medications.    She is not sexually active.     Patient reports regular self breast exams: no.  The patient has concerns because one of her breasts is now larger than the other one. Patient had a breast biopsy showing fibrocystic changes in 2017.  She exercises regularly: no.  She has noticed changes in height: yes, patient reports she has lost two inches in height    No Additional Complaints Reported    The following portions of the patient's history were reviewed and updated as appropriate:problem list, current medications, allergies, past family history, past medical history, past social history and past surgical history.  Smoking status: Never Smoker                                                              Smokeless tobacco: Never Used                        Review of Systems   Constitutional: Negative for activity change and unexpected weight change.   HENT: Negative for congestion.    Eyes: Positive for visual disturbance (wears glasses).   Respiratory: Negative for shortness of breath.    Cardiovascular: Negative for chest pain.   Gastrointestinal: Positive for constipation. " "Negative for abdominal pain, blood in stool and diarrhea.   Endocrine: Positive for heat intolerance (hot flashes from oxycodone).   Genitourinary: Positive for enuresis (just a little, better than it used to be). Negative for difficulty urinating, vaginal bleeding, vaginal discharge and vaginal pain.   Musculoskeletal: Positive for arthralgias and back pain.   Skin: Negative for rash.   Neurological: Positive for headaches (takes Imitrex). Negative for dizziness.        No recent falls   Psychiatric/Behavioral: Positive for sleep disturbance. The patient is nervous/anxious.          Objective   /60   Ht 157.5 cm (62\")   Wt 93.4 kg (206 lb)   BMI 37.68 kg/m²   Physical Exam   Constitutional: She is oriented to person, place, and time. She appears well-developed and well-nourished. No distress.   HENT:   Head: Normocephalic and atraumatic.   Eyes: EOM are normal.   Neck: Normal range of motion. Neck supple. No thyromegaly present.   Cardiovascular: Normal rate and regular rhythm.    No murmur heard.  Pulmonary/Chest: Effort normal and breath sounds normal. Right breast exhibits no inverted nipple and no mass. Left breast exhibits no inverted nipple and no mass.   Abdominal: Soft. She exhibits no distension. There is no tenderness.   Genitourinary: Vagina normal. Rectal exam shows anal tone normal. No breast tenderness or discharge. Pelvic exam was performed with patient supine. There is no tenderness or lesion on the right labia. There is no tenderness or lesion on the left labia. Right adnexum displays no tenderness and no fullness. Left adnexum displays no tenderness and no fullness. No bleeding in the vagina. No vaginal discharge found.   Genitourinary Comments: Patient s/p hysterectomy:  Uterus and cervix surgically absent.  Vaginal cuff unremarkable.  Labia normal, no lesions noted.  Urethral meatus unremarkable, no prolapse of mucosa.  Anus/perineum normal   Musculoskeletal: Normal range of motion. " "  Neurological: She is alert and oriented to person, place, and time.   Skin: Skin is warm and dry.   Psychiatric: She has a normal mood and affect. Her behavior is normal. Judgment normal.   Nursing note and vitals reviewed.         Assessment & Plan      Jennifer was seen today for follow-up.    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  Patient encouraged to use vaginal dilators to allow continued surveillance exams.  She still reports that this seems \"awkward\" to her.  S/P hysterectomy with oophorectomy    BMI 37.0-37.9, adult    History of radiation therapy: Has resulted in narrowing of vagina that makes examination difficult.  Patient can barely tolerate insertion of small speculum.    Non-smoker    Spinal stenosis, lumbar region, without neurogenic claudication        This note was electronically signed.    Shasta Madrigal MD  7/10/2018  11:28 AM  "

## 2018-08-13 RX ORDER — ATORVASTATIN CALCIUM 40 MG/1
TABLET, FILM COATED ORAL
Qty: 90 TABLET | Refills: 2 | Status: SHIPPED | OUTPATIENT
Start: 2018-08-13 | End: 2019-04-17 | Stop reason: ALTCHOICE

## 2018-08-27 DIAGNOSIS — Z78.0 POSTMENOPAUSAL: Primary | ICD-10-CM

## 2018-08-28 ENCOUNTER — HOSPITAL ENCOUNTER (OUTPATIENT)
Dept: BONE DENSITY | Facility: HOSPITAL | Age: 78
Discharge: HOME | End: 2018-08-28
Attending: OBSTETRICS & GYNECOLOGY

## 2018-08-28 ENCOUNTER — HOSPITAL ENCOUNTER (OUTPATIENT)
Dept: MAMMOGRAPHY | Facility: HOSPITAL | Age: 78
Discharge: HOME | End: 2018-08-28
Attending: OBSTETRICS & GYNECOLOGY | Admitting: OBSTETRICS & GYNECOLOGY

## 2018-08-28 DIAGNOSIS — Z78.0 POSTMENOPAUSAL: ICD-10-CM

## 2018-08-28 DIAGNOSIS — Z12.31 ENCOUNTER FOR SCREENING MAMMOGRAM FOR BREAST CANCER: ICD-10-CM

## 2018-08-28 PROCEDURE — 77067 SCR MAMMO BI INCL CAD: CPT

## 2018-08-28 PROCEDURE — 77080 DXA BONE DENSITY AXIAL: CPT

## 2018-08-28 PROCEDURE — 77063 BREAST TOMOSYNTHESIS BI: CPT

## 2018-09-10 DIAGNOSIS — E78.2 MIXED HYPERLIPIDEMIA: ICD-10-CM

## 2018-09-10 DIAGNOSIS — E03.9 HYPOTHYROIDISM (ACQUIRED): ICD-10-CM

## 2018-09-10 DIAGNOSIS — I10 ESSENTIAL HYPERTENSION: ICD-10-CM

## 2018-09-10 DIAGNOSIS — E55.9 VITAMIN D DEFICIENCY: ICD-10-CM

## 2018-09-10 LAB
ALBUMIN SERPL-MCNC: 4 G/DL (ref 3.5–5.2)
ALP BLD-CCNC: 107 U/L (ref 35–104)
ALT SERPL-CCNC: 6 U/L (ref 5–33)
ANION GAP SERPL CALCULATED.3IONS-SCNC: 15 MMOL/L (ref 7–19)
AST SERPL-CCNC: 10 U/L (ref 5–32)
BILIRUB SERPL-MCNC: 0.3 MG/DL (ref 0.2–1.2)
BUN BLDV-MCNC: 22 MG/DL (ref 8–23)
CALCIUM SERPL-MCNC: 9.7 MG/DL (ref 8.8–10.2)
CHLORIDE BLD-SCNC: 100 MMOL/L (ref 98–111)
CHOLESTEROL, TOTAL: 162 MG/DL (ref 160–199)
CO2: 26 MMOL/L (ref 22–29)
CREAT SERPL-MCNC: 1 MG/DL (ref 0.5–0.9)
GFR NON-AFRICAN AMERICAN: 54
GLUCOSE BLD-MCNC: 114 MG/DL (ref 74–109)
HCT VFR BLD CALC: 33.7 % (ref 37–47)
HDLC SERPL-MCNC: 43 MG/DL (ref 65–121)
HEMOGLOBIN: 11.1 G/DL (ref 12–16)
LDL CHOLESTEROL CALCULATED: 68 MG/DL
MCH RBC QN AUTO: 29.6 PG (ref 27–31)
MCHC RBC AUTO-ENTMCNC: 32.9 G/DL (ref 33–37)
MCV RBC AUTO: 89.9 FL (ref 81–99)
PDW BLD-RTO: 12.4 % (ref 11.5–14.5)
PLATELET # BLD: 152 K/UL (ref 130–400)
PMV BLD AUTO: 10.8 FL (ref 9.4–12.3)
POTASSIUM SERPL-SCNC: 4.5 MMOL/L (ref 3.5–5)
RBC # BLD: 3.75 M/UL (ref 4.2–5.4)
SODIUM BLD-SCNC: 141 MMOL/L (ref 136–145)
TOTAL PROTEIN: 6.7 G/DL (ref 6.6–8.7)
TRIGL SERPL-MCNC: 255 MG/DL (ref 0–149)
TSH SERPL DL<=0.05 MIU/L-ACNC: 4.65 UIU/ML (ref 0.27–4.2)
VITAMIN D 25-HYDROXY: 25.9 NG/ML
WBC # BLD: 4.8 K/UL (ref 4.8–10.8)

## 2018-09-17 ENCOUNTER — OFFICE VISIT (OUTPATIENT)
Dept: INTERNAL MEDICINE | Age: 78
End: 2018-09-17
Payer: MEDICARE

## 2018-09-17 VITALS
OXYGEN SATURATION: 97 % | DIASTOLIC BLOOD PRESSURE: 82 MMHG | BODY MASS INDEX: 38.09 KG/M2 | RESPIRATION RATE: 16 BRPM | HEIGHT: 62 IN | SYSTOLIC BLOOD PRESSURE: 128 MMHG | WEIGHT: 207 LBS | HEART RATE: 80 BPM

## 2018-09-17 DIAGNOSIS — Z78.0 POST-MENOPAUSAL: Primary | ICD-10-CM

## 2018-09-17 DIAGNOSIS — I10 ESSENTIAL HYPERTENSION: ICD-10-CM

## 2018-09-17 DIAGNOSIS — E55.9 VITAMIN D DEFICIENCY: ICD-10-CM

## 2018-09-17 DIAGNOSIS — N18.30 CHRONIC KIDNEY DISEASE, STAGE 3 (MODERATE): ICD-10-CM

## 2018-09-17 DIAGNOSIS — E78.2 MIXED HYPERLIPIDEMIA: ICD-10-CM

## 2018-09-17 DIAGNOSIS — E03.9 HYPOTHYROIDISM (ACQUIRED): ICD-10-CM

## 2018-09-17 DIAGNOSIS — G89.4 CHRONIC PAIN SYNDROME: ICD-10-CM

## 2018-09-17 DIAGNOSIS — G44.89 OTHER HEADACHE SYNDROME: ICD-10-CM

## 2018-09-17 DIAGNOSIS — I25.10 CORONARY ARTERY DISEASE INVOLVING NATIVE CORONARY ARTERY OF NATIVE HEART WITHOUT ANGINA PECTORIS: ICD-10-CM

## 2018-09-17 PROCEDURE — G8598 ASA/ANTIPLAT THER USED: HCPCS | Performed by: NURSE PRACTITIONER

## 2018-09-17 PROCEDURE — 1101F PT FALLS ASSESS-DOCD LE1/YR: CPT | Performed by: NURSE PRACTITIONER

## 2018-09-17 PROCEDURE — G8417 CALC BMI ABV UP PARAM F/U: HCPCS | Performed by: NURSE PRACTITIONER

## 2018-09-17 PROCEDURE — 1036F TOBACCO NON-USER: CPT | Performed by: NURSE PRACTITIONER

## 2018-09-17 PROCEDURE — 1090F PRES/ABSN URINE INCON ASSESS: CPT | Performed by: NURSE PRACTITIONER

## 2018-09-17 PROCEDURE — 1123F ACP DISCUSS/DSCN MKR DOCD: CPT | Performed by: NURSE PRACTITIONER

## 2018-09-17 PROCEDURE — 4040F PNEUMOC VAC/ADMIN/RCVD: CPT | Performed by: NURSE PRACTITIONER

## 2018-09-17 PROCEDURE — G8427 DOCREV CUR MEDS BY ELIG CLIN: HCPCS | Performed by: NURSE PRACTITIONER

## 2018-09-17 PROCEDURE — 99214 OFFICE O/P EST MOD 30 MIN: CPT | Performed by: NURSE PRACTITIONER

## 2018-09-17 PROCEDURE — G8400 PT W/DXA NO RESULTS DOC: HCPCS | Performed by: NURSE PRACTITIONER

## 2018-09-17 PROCEDURE — 3017F COLORECTAL CA SCREEN DOC REV: CPT | Performed by: NURSE PRACTITIONER

## 2018-09-17 RX ORDER — ERGOCALCIFEROL (VITAMIN D2) 1250 MCG
50000 CAPSULE ORAL WEEKLY
Qty: 12 CAPSULE | Refills: 3 | Status: SHIPPED | OUTPATIENT
Start: 2018-09-17 | End: 2019-03-18 | Stop reason: SDUPTHER

## 2018-09-17 RX ORDER — CHLORAL HYDRATE 500 MG
2000 CAPSULE ORAL 2 TIMES DAILY
Qty: 180 CAPSULE | Refills: 2 | Status: SHIPPED | OUTPATIENT
Start: 2018-09-17 | End: 2020-01-22 | Stop reason: SDUPTHER

## 2018-09-17 ASSESSMENT — PATIENT HEALTH QUESTIONNAIRE - PHQ9
SUM OF ALL RESPONSES TO PHQ9 QUESTIONS 1 & 2: 0
2. FEELING DOWN, DEPRESSED OR HOPELESS: 0
1. LITTLE INTEREST OR PLEASURE IN DOING THINGS: 0
SUM OF ALL RESPONSES TO PHQ QUESTIONS 1-9: 0
SUM OF ALL RESPONSES TO PHQ QUESTIONS 1-9: 0

## 2018-09-17 ASSESSMENT — ENCOUNTER SYMPTOMS
CHOKING: 0
CONSTIPATION: 0
BACK PAIN: 1
NAUSEA: 0
WHEEZING: 0
EYE DISCHARGE: 0
ABDOMINAL DISTENTION: 0
DIARRHEA: 0
TROUBLE SWALLOWING: 0
SORE THROAT: 0
VOMITING: 0
BLOOD IN STOOL: 0
COUGH: 0
COLOR CHANGE: 0
SHORTNESS OF BREATH: 0
EYE ITCHING: 0
STRIDOR: 0
ABDOMINAL PAIN: 0

## 2018-09-17 NOTE — PATIENT INSTRUCTIONS
1. Chronic pain syndrome -  You need to get with Dr Nasreen Godoy office to get your refills; You need to get all your narcotics from him; You have a drug contract with him   2,  Vit D def;  Refill meds to take weekly   3.  CAd;  Stable no change  4,  Headaches; try OTC meds; Rest; relaxation;  Get with Dr Fernando Miguel for your meds  5,  Htn;  Stable for now; no changes  6. Hyperlipidemia; We will add fish oil twice daily; You can put them in the freezer to help with the taste   7. Hypothyroidism;   No changes

## 2018-09-17 NOTE — PROGRESS NOTES
[facility]  [facility] [facility]  [address] [place] [address]  Dept: [phone]  Dept Fax: [phone]  Loc: [phone]    Frida Carrasco is a 78 y.o. female who presents today for her medical conditions/complaints as noted below. Frida Carrasco is c/o of Hypertension (Patient is here for routine follow up visit and review of labs done 9/10/18.)        HPI:     HPI   1. Chronic pain syndrome; She is on oxycodone; She has been out of her meds for several weeks;  ;  She has gone there for years and has been there and was told meds are ready for him to sign; She has been checking with pharmacy and nothing is there;  I am certain she is having some withdrawals as she has nausea and fatigue ; She has 5 different meds that she gets from him;  Flexeril, fiorinal, sinemet, imitrex and oxycodone;    2.  CAD; Stable no CP or SOB   3. Vit d def; Has been out of her meds;   4. Headaches; is out of all her meds;    5.  HTN; HTN:  Stable on current meds; No side effects of the meds; Takes as directed; takes blood pressure 3-4 times a week   6. Hyperlipidemia; Hyperlipidemia-  Stable on current meds; Takes as directed; No side effects of the meds;  Trigs are up;    7. Hypothyroidism; Level today is ok;  Hypothyroidism;  ;  Stable  ;takes meds as directed; Has had no side effects of the meds;   Chief Complaint   Patient presents with    Hypertension     Patient is here for routine follow up visit and review of labs done 9/10/18.        Past Medical History:   Diagnosis Date    Adenocarcinoma of endometrium, stage 1 ([facility] [address] 11/14/2017    Anemia     Arthritis     CAD (coronary artery disease)     \"Stiff Valve\"    Chronic kidney disease     Depression     Fibrocystic breast disease     5/2/17 biopsy - benign FCBD w/microcalcifications - [facility]     GERD (gastroesophageal reflux disease)     H/O vaginal bleeding     managed Dr Sherin Moulton Hypertension     Hypothyroidism (acquired)     Hypothyroidism (acquired)     Mixed hyperlipidemia     Obesity     Osteoarthritis     Pain of both hip joints 1/18/2016    Situational anxiety     Sleep apnea     Vitamin D deficiency       Past Surgical History:   Procedure Laterality Date    BREAST BIOPSY Right     BREAST SURGERY Left     Biopsy, Benign    CYST REMOVAL Left     Shoulder cyst, benign    DILATION AND CURETTAGE OF UTERUS      EYE SURGERY  03/2017    HYSTERECTOMY      Removed uterus and cervix due to cancer, 12/17    LEG SURGERY Left     Tib/Fib ORIF    TONSILLECTOMY         Vitals 9/17/2018 6/12/2018 5/29/2018 5/29/2018 5/29/2018 4/63/5672   SYSTOLIC 524 827 303 344 190 202   DIASTOLIC 82 74 70 76 68 87   Site - - - - - -   Position - - - - - -   Pulse 80 59 81 79 91 87   Temp - - - - - -   Resp 16 16 20 20 16 16   Weight 207 lb 196 lb - - - -   Height 5' 2\" 5' 2\" - - - -   BMI (wt*703/ht~2) 37.86 kg/m2 35.84 kg/m2 - - - -   Pain Level - - - 8 - -   Some recent data might be hidden       Family History   Problem Relation Age of Onset    Cancer Mother        Social History   Substance Use Topics    Smoking status: Never Smoker    Smokeless tobacco: Never Used    Alcohol use Yes      Comment: occassionally      Current Outpatient Prescriptions   Medication Sig Dispense Refill    ergocalciferol (ERGOCALCIFEROL) 29703 units capsule Take 1 capsule by mouth once a week 12 capsule 3    Omega-3 Fatty Acids (FISH OIL) 1000 MG CAPS Take 2 capsules by mouth 2 times daily 180 capsule 2    atorvastatin (LIPITOR) 40 MG tablet TAKE 1 TABLET DAILY AS DIRECTED 90 tablet 2    SUMAtriptan (IMITREX) 50 MG tablet Take 1 tablet by mouth 2 times daily as needed for Migraine (Max 2/day -- 3 month supply) 60 tablet 0    carvedilol (COREG) 6.25 MG tablet TAKE 1 TABLET TWICE A  tablet 3    irbesartan (AVAPRO) 150 MG tablet TAKE 1 TABLET DAILY 14 tablet 0    levothyroxine (SYNTHROID) 50 MCG tablet Take 1 tablet by capsule     Refill:  2         ORDERS:  Orders Placed This Encounter   Procedures    DEXA Bone Density Axial Skeleton       Follow-up:  Return in about 3 months (around 12/17/2018) for yearly physical.    PATIENT INSTRUCTIONS:  Patient Instructions   1. Chronic pain syndrome -  You need to get with Dr Alexis Shade office to get your refills; You need to get all your narcotics from him; You have a drug contract with him   2,  Vit D def;  Refill meds to take weekly   3.  CAd;  Stable no change  4,  Headaches; try OTC meds; Rest; relaxation;  Get with Dr Alanis Rg for your meds  5,  Htn;  Stable for now; no changes  6. Hyperlipidemia; We will add fish oil twice daily; You can put them in the freezer to help with the taste   7. Hypothyroidism; No changes     Electronically signed by TAMIA Knight on 9/17/2018 at 12:26 PM    EMR Dragon/transcription disclaimer:  Much of this encounter note is electronic transcription/translation of spoken language to printed texts. The electronic translation of spoken language may be erroneous, or at times, nonsensical words or phrases may be inadvertently transcribed.   Although I have reviewed the note for such errors, some may still exist.

## 2018-10-12 DIAGNOSIS — N18.30 CHRONIC KIDNEY DISEASE, STAGE 3 (MODERATE): ICD-10-CM

## 2018-10-12 DIAGNOSIS — I10 ESSENTIAL HYPERTENSION: ICD-10-CM

## 2018-10-12 DIAGNOSIS — G89.4 CHRONIC PAIN SYNDROME: ICD-10-CM

## 2018-10-12 DIAGNOSIS — M48.061 SPINAL STENOSIS AT L4-L5 LEVEL: Chronic | ICD-10-CM

## 2018-10-12 DIAGNOSIS — Z87.42 H/O VAGINAL BLEEDING: ICD-10-CM

## 2018-10-12 RX ORDER — CARVEDILOL 6.25 MG/1
TABLET ORAL
Qty: 180 TABLET | Refills: 3 | Status: SHIPPED | OUTPATIENT
Start: 2018-10-12 | End: 2019-03-18 | Stop reason: SDUPTHER

## 2018-10-12 RX ORDER — DILTIAZEM HYDROCHLORIDE 240 MG/1
240 CAPSULE, EXTENDED RELEASE ORAL DAILY
Qty: 90 CAPSULE | Refills: 3 | Status: SHIPPED | OUTPATIENT
Start: 2018-10-12 | End: 2019-05-23 | Stop reason: ALTCHOICE

## 2018-10-12 RX ORDER — CELECOXIB 200 MG/1
CAPSULE ORAL
Qty: 16 CAPSULE | Refills: 2 | Status: SHIPPED | OUTPATIENT
Start: 2018-10-12 | End: 2018-10-15

## 2018-10-15 DIAGNOSIS — G89.4 CHRONIC PAIN SYNDROME: ICD-10-CM

## 2018-10-15 DIAGNOSIS — M48.061 SPINAL STENOSIS AT L4-L5 LEVEL: Chronic | ICD-10-CM

## 2018-10-15 DIAGNOSIS — I10 ESSENTIAL HYPERTENSION: ICD-10-CM

## 2018-10-15 DIAGNOSIS — Z87.42 H/O VAGINAL BLEEDING: ICD-10-CM

## 2018-10-15 DIAGNOSIS — N18.30 CHRONIC KIDNEY DISEASE, STAGE 3 (MODERATE): ICD-10-CM

## 2018-10-15 RX ORDER — CELECOXIB 100 MG/1
CAPSULE ORAL
Qty: 50 CAPSULE | Refills: 2 | Status: SHIPPED | OUTPATIENT
Start: 2018-10-15 | End: 2019-03-18 | Stop reason: SDUPTHER

## 2018-12-24 ENCOUNTER — OFFICE VISIT (OUTPATIENT)
Dept: INTERNAL MEDICINE | Age: 78
End: 2018-12-24
Payer: MEDICARE

## 2018-12-24 VITALS
BODY MASS INDEX: 37.73 KG/M2 | HEIGHT: 62 IN | OXYGEN SATURATION: 95 % | RESPIRATION RATE: 16 BRPM | HEART RATE: 80 BPM | SYSTOLIC BLOOD PRESSURE: 152 MMHG | DIASTOLIC BLOOD PRESSURE: 84 MMHG | WEIGHT: 205 LBS

## 2018-12-24 DIAGNOSIS — M48.061 SPINAL STENOSIS AT L4-L5 LEVEL: Chronic | ICD-10-CM

## 2018-12-24 DIAGNOSIS — R51.9 PERSISTENT HEADACHES: ICD-10-CM

## 2018-12-24 DIAGNOSIS — I10 ESSENTIAL HYPERTENSION: ICD-10-CM

## 2018-12-24 DIAGNOSIS — M89.9 DISORDER OF BONE: ICD-10-CM

## 2018-12-24 DIAGNOSIS — Z00.00 HEALTHCARE MAINTENANCE: ICD-10-CM

## 2018-12-24 DIAGNOSIS — G89.4 CHRONIC PAIN SYNDROME: Primary | ICD-10-CM

## 2018-12-24 DIAGNOSIS — E55.9 VITAMIN D DEFICIENCY: ICD-10-CM

## 2018-12-24 PROBLEM — M47.816 LUMBAR FACET ARTHROPATHY: Chronic | Status: RESOLVED | Noted: 2018-05-29 | Resolved: 2018-12-24

## 2018-12-24 PROCEDURE — G8400 PT W/DXA NO RESULTS DOC: HCPCS | Performed by: NURSE PRACTITIONER

## 2018-12-24 PROCEDURE — 1123F ACP DISCUSS/DSCN MKR DOCD: CPT | Performed by: NURSE PRACTITIONER

## 2018-12-24 PROCEDURE — G8484 FLU IMMUNIZE NO ADMIN: HCPCS | Performed by: NURSE PRACTITIONER

## 2018-12-24 PROCEDURE — 1036F TOBACCO NON-USER: CPT | Performed by: NURSE PRACTITIONER

## 2018-12-24 PROCEDURE — 1090F PRES/ABSN URINE INCON ASSESS: CPT | Performed by: NURSE PRACTITIONER

## 2018-12-24 PROCEDURE — 4040F PNEUMOC VAC/ADMIN/RCVD: CPT | Performed by: NURSE PRACTITIONER

## 2018-12-24 PROCEDURE — G8417 CALC BMI ABV UP PARAM F/U: HCPCS | Performed by: NURSE PRACTITIONER

## 2018-12-24 PROCEDURE — 99214 OFFICE O/P EST MOD 30 MIN: CPT | Performed by: NURSE PRACTITIONER

## 2018-12-24 PROCEDURE — G8427 DOCREV CUR MEDS BY ELIG CLIN: HCPCS | Performed by: NURSE PRACTITIONER

## 2018-12-24 PROCEDURE — 1101F PT FALLS ASSESS-DOCD LE1/YR: CPT | Performed by: NURSE PRACTITIONER

## 2018-12-24 PROCEDURE — G8598 ASA/ANTIPLAT THER USED: HCPCS | Performed by: NURSE PRACTITIONER

## 2018-12-24 RX ORDER — SUMATRIPTAN 50 MG/1
50 TABLET, FILM COATED ORAL 2 TIMES DAILY PRN
Qty: 60 TABLET | Refills: 1 | Status: SHIPPED | OUTPATIENT
Start: 2018-12-24 | End: 2019-03-18 | Stop reason: SDUPTHER

## 2018-12-24 RX ORDER — CYCLOBENZAPRINE HCL 10 MG
10 TABLET ORAL 2 TIMES DAILY PRN
Qty: 180 TABLET | Refills: 1 | Status: SHIPPED | OUTPATIENT
Start: 2018-12-24 | End: 2019-06-24 | Stop reason: SDUPTHER

## 2018-12-24 ASSESSMENT — ENCOUNTER SYMPTOMS
COLOR CHANGE: 0
ABDOMINAL PAIN: 0
NAUSEA: 0
BLOOD IN STOOL: 0
DIARRHEA: 0
TROUBLE SWALLOWING: 0
VOMITING: 0
ABDOMINAL DISTENTION: 0
EYE ITCHING: 0
BACK PAIN: 1
CONSTIPATION: 0
CHOKING: 0
COUGH: 0
SHORTNESS OF BREATH: 0
STRIDOR: 0
SORE THROAT: 0
EYE DISCHARGE: 0
WHEEZING: 0

## 2018-12-24 NOTE — PROGRESS NOTES
Obesity     Osteoarthritis     Pain of both hip joints 1/18/2016    Situational anxiety     Sleep apnea     Vitamin D deficiency       Past Surgical History:   Procedure Laterality Date    BREAST BIOPSY Right     BREAST SURGERY Left     Biopsy, Benign    CYST REMOVAL Left     Shoulder cyst, benign    DILATION AND CURETTAGE OF UTERUS      EYE SURGERY  03/2017    HYSTERECTOMY      Removed uterus and cervix due to cancer, 12/17    LEG SURGERY Left     Tib/Fib ORIF    TONSILLECTOMY         Vitals 12/24/2018 12/24/2018 9/17/2018 6/12/2018 5/29/2018 0/36/4745   SYSTOLIC 378 [phone]   DIASTOLIC 84 88 82 74 70 76   Site - - - - - -   Position - - - - - -   Pulse - 80 80 59 81 79   Temp - - - - - -   Resp - 16 16 16 20 20   Weight - 205 lb 207 lb 196 lb - -   Height - 5' 2\" 5' 2\" 5' 2\" - -   BMI (wt*703/ht~2) - 37.49 kg/m2 37.86 kg/m2 35.84 kg/m2 - -   Pain Level - - - - - 8   Some recent data might be hidden       Family History   Problem Relation Age of Onset    Cancer Mother        Social History   Substance Use Topics    Smoking status: Never Smoker    Smokeless tobacco: Never Used    Alcohol use Yes      Comment: occassionally      Current Outpatient Prescriptions   Medication Sig Dispense Refill    cyclobenzaprine (FLEXERIL) 10 MG tablet Take 1 tablet by mouth 2 times daily as needed for Muscle spasms 3 month supply 180 tablet 1    SUMAtriptan (IMITREX) 50 MG tablet Take 1 tablet by mouth 2 times daily as needed for Migraine (Max 2/day -- 3 month supply) 60 tablet 1    celecoxib (CELEBREX) 100 MG capsule TAKE 1 CAPSULE 3 TO 4 TIMES WEEKLY AS NEEDED (NEW DOSE, DECREASED MG PER DR BOYCE) 50 capsule 2    carvedilol (COREG) 6.25 MG tablet TAKE 1 TABLET TWICE A  tablet 3    diltiazem (TIAZAC) 240 MG extended release capsule Take 1 capsule by mouth daily 90 capsule 3    ergocalciferol (ERGOCALCIFEROL) 59509 units capsule Take 1 capsule by mouth once a week 12 capsule 3    Omega-3 Fatty Acids (FISH OIL) 1000 MG CAPS Take 2 capsules by mouth 2 times daily 180 capsule 2    atorvastatin (LIPITOR) 40 MG tablet TAKE 1 TABLET DAILY AS DIRECTED 90 tablet 2    oxyCODONE (OXY-IR) 15 MG immediate release tablet Take 1 tablet by mouth every 4 hours as needed for Pain for up to 30 days. Rojas Muta Date: 7/2/18 180 tablet 0    irbesartan (AVAPRO) 150 MG tablet TAKE 1 TABLET DAILY 14 tablet 0    levothyroxine (SYNTHROID) 50 MCG tablet Take 1 tablet by mouth daily 90 tablet 3    levothyroxine (SYNTHROID) 50 MCG tablet Take 1 tablet by mouth daily 30 tablet 0    butalbital-aspirin-caffeine (FIORINAL) -40 MG capsule Take 1 capsule by mouth every 6 hours as needed for Headaches (max 4/day month supply) for up to 30 days. . 40 capsule 2    carbidopa-levodopa (SINEMET)  MG per tablet Take 1 tablet by mouth 4 times daily For restless legs and cramping 360 tablet 0    escitalopram (LEXAPRO) 20 MG tablet TAKE 1 TABLET DAILY 90 tablet 3    lansoprazole (PREVACID) 30 MG delayed release capsule TAKE 1 CAPSULE DAILY EVERY MORNING 90 capsule 3    Prenatal Multivit-Min-Fe-FA (PRENATAL 1 + IRON PO) Take by mouth      loratadine (CLARITIN) 10 MG capsule Take 10 mg by mouth daily      FIBER FORMULA PO Take by mouth      aspirin 81 MG tablet Take 81 mg by mouth daily       No current facility-administered medications for this visit.       Allergies   Allergen Reactions    Ambien [Zolpidem Tartrate]     Codeine     Lyrica [Pregabalin]     Morphine     Requip [Ropinirole Hcl]     Tizanidine      Terrible nightmares         Health Maintenance   Topic Date Due    ANDREY (modify frequency per FRAX score)  09/19/2007    Flu vaccine (1) 09/01/2018    DTaP/Tdap/Td vaccine (1 - Tdap) 02/11/2019 (Originally 9/19/1959)    Pneumococcal low/med risk (2 of 2 - PPSV23) 02/11/2019 (Originally 11/1/2015)    Shingles Vaccine (1 of 2 - 2 Dose Series) 02/11/2019 (Originally 1/20/2014)    Breast cancer screen no change  4. Vit d def  Stable no labs today;  Continue to take your meds; Fu in 3 months with labs; Electronically signed by TAMIA Pastor on 12/24/2018 at 11:46 AM    EMRDragon/transcription disclaimer:  Much of this encounter note is electronic transcription/translation of spoken language to printed texts. The electronic translation of spoken language may be erroneous, or at times,nonsensical words or phrases may be inadvertently transcribed.   Although I have reviewed the note for such errors, some may still exist.

## 2019-03-07 ENCOUNTER — TELEPHONE (OUTPATIENT)
Dept: ADMINISTRATIVE | Age: 79
End: 2019-03-07

## 2019-03-11 RX ORDER — LEVOTHYROXINE SODIUM 0.05 MG/1
TABLET ORAL
Qty: 30 TABLET | Refills: 2 | Status: SHIPPED | OUTPATIENT
Start: 2019-03-11 | End: 2019-04-17 | Stop reason: SDUPTHER

## 2019-03-18 ENCOUNTER — HOSPITAL ENCOUNTER (OUTPATIENT)
Dept: GENERAL RADIOLOGY | Age: 79
Discharge: HOME | End: 2019-03-18
Payer: MEDICARE

## 2019-03-18 ENCOUNTER — OFFICE VISIT (OUTPATIENT)
Dept: INTERNAL MEDICINE | Age: 79
End: 2019-03-18
Payer: MEDICARE

## 2019-03-18 VITALS
WEIGHT: 191 LBS | HEART RATE: 100 BPM | BODY MASS INDEX: 30.7 KG/M2 | DIASTOLIC BLOOD PRESSURE: 82 MMHG | RESPIRATION RATE: 16 BRPM | HEIGHT: 66 IN | SYSTOLIC BLOOD PRESSURE: 132 MMHG | OXYGEN SATURATION: 98 %

## 2019-03-18 DIAGNOSIS — M89.9 DISORDER OF BONE: ICD-10-CM

## 2019-03-18 DIAGNOSIS — M25.569 KNEE PAIN, UNSPECIFIED CHRONICITY, UNSPECIFIED LATERALITY: ICD-10-CM

## 2019-03-18 DIAGNOSIS — R30.0 DYSURIA: Primary | ICD-10-CM

## 2019-03-18 DIAGNOSIS — R30.0 DYSURIA: ICD-10-CM

## 2019-03-18 DIAGNOSIS — G89.4 CHRONIC PAIN SYNDROME: ICD-10-CM

## 2019-03-18 DIAGNOSIS — Z00.00 HEALTHCARE MAINTENANCE: ICD-10-CM

## 2019-03-18 DIAGNOSIS — F41.9 ANXIETY: ICD-10-CM

## 2019-03-18 DIAGNOSIS — I10 ESSENTIAL HYPERTENSION: ICD-10-CM

## 2019-03-18 DIAGNOSIS — N18.30 CHRONIC KIDNEY DISEASE, STAGE 3 (MODERATE): ICD-10-CM

## 2019-03-18 LAB
ALBUMIN SERPL-MCNC: 3.8 G/DL (ref 3.5–5.2)
ALP BLD-CCNC: 84 U/L (ref 35–104)
ALT SERPL-CCNC: 8 U/L (ref 5–33)
ANION GAP SERPL CALCULATED.3IONS-SCNC: 12 MMOL/L (ref 7–19)
AST SERPL-CCNC: 11 U/L (ref 5–32)
BACTERIA: ABNORMAL /HPF
BASOPHILS ABSOLUTE: 0 K/UL (ref 0–0.2)
BASOPHILS RELATIVE PERCENT: 0.3 % (ref 0–1)
BILIRUB SERPL-MCNC: 0.3 MG/DL (ref 0.2–1.2)
BILIRUBIN URINE: NEGATIVE
BLOOD, URINE: ABNORMAL
BUN BLDV-MCNC: 15 MG/DL (ref 8–23)
CALCIUM SERPL-MCNC: 9.4 MG/DL (ref 8.8–10.2)
CHLORIDE BLD-SCNC: 105 MMOL/L (ref 98–111)
CHOLESTEROL, TOTAL: 152 MG/DL (ref 160–199)
CLARITY: ABNORMAL
CO2: 27 MMOL/L (ref 22–29)
COLOR: ABNORMAL
CREAT SERPL-MCNC: 1.1 MG/DL (ref 0.5–0.9)
EOSINOPHILS ABSOLUTE: 0.2 K/UL (ref 0–0.6)
EOSINOPHILS RELATIVE PERCENT: 2.9 % (ref 0–5)
GFR NON-AFRICAN AMERICAN: 48
GLUCOSE BLD-MCNC: 121 MG/DL (ref 74–109)
GLUCOSE URINE: NEGATIVE MG/DL
HCT VFR BLD CALC: 33.7 % (ref 37–47)
HDLC SERPL-MCNC: 44 MG/DL (ref 65–121)
HEMOGLOBIN: 10.7 G/DL (ref 12–16)
KETONES, URINE: ABNORMAL MG/DL
LDL CHOLESTEROL CALCULATED: 60 MG/DL
LEUKOCYTE ESTERASE, URINE: ABNORMAL
LYMPHOCYTES ABSOLUTE: 0.9 K/UL (ref 1.1–4.5)
LYMPHOCYTES RELATIVE PERCENT: 14.2 % (ref 20–40)
MCH RBC QN AUTO: 29 PG (ref 27–31)
MCHC RBC AUTO-ENTMCNC: 31.8 G/DL (ref 33–37)
MCV RBC AUTO: 91.3 FL (ref 81–99)
MONOCYTES ABSOLUTE: 0.6 K/UL (ref 0–0.9)
MONOCYTES RELATIVE PERCENT: 9.4 % (ref 0–10)
NEUTROPHILS ABSOLUTE: 4.8 K/UL (ref 1.5–7.5)
NEUTROPHILS RELATIVE PERCENT: 72.9 % (ref 50–65)
NITRITE, URINE: POSITIVE
PDW BLD-RTO: 13.6 % (ref 11.5–14.5)
PH UA: 6 (ref 5–8)
PLATELET # BLD: 199 K/UL (ref 130–400)
PMV BLD AUTO: 11 FL (ref 9.4–12.3)
POTASSIUM SERPL-SCNC: 4.7 MMOL/L (ref 3.5–5)
PROTEIN UA: 100 MG/DL
RBC # BLD: 3.69 M/UL (ref 4.2–5.4)
RBC UA: ABNORMAL /HPF (ref 0–2)
SODIUM BLD-SCNC: 144 MMOL/L (ref 136–145)
SPECIFIC GRAVITY UA: 1.02 (ref 1–1.03)
TOTAL PROTEIN: 6.6 G/DL (ref 6.6–8.7)
TRIGL SERPL-MCNC: 242 MG/DL (ref 0–149)
TSH SERPL DL<=0.05 MIU/L-ACNC: 2.07 UIU/ML (ref 0.27–4.2)
URINE REFLEX TO CULTURE: YES
UROBILINOGEN, URINE: 0.2 E.U./DL
VITAMIN D 25-HYDROXY: 44.9 NG/ML
WBC # BLD: 6.6 K/UL (ref 4.8–10.8)
WBC UA: ABNORMAL /HPF (ref 0–5)

## 2019-03-18 PROCEDURE — G8482 FLU IMMUNIZE ORDER/ADMIN: HCPCS | Performed by: NURSE PRACTITIONER

## 2019-03-18 PROCEDURE — 1036F TOBACCO NON-USER: CPT | Performed by: NURSE PRACTITIONER

## 2019-03-18 PROCEDURE — 1090F PRES/ABSN URINE INCON ASSESS: CPT | Performed by: NURSE PRACTITIONER

## 2019-03-18 PROCEDURE — 1123F ACP DISCUSS/DSCN MKR DOCD: CPT | Performed by: NURSE PRACTITIONER

## 2019-03-18 PROCEDURE — G8400 PT W/DXA NO RESULTS DOC: HCPCS | Performed by: NURSE PRACTITIONER

## 2019-03-18 PROCEDURE — 1101F PT FALLS ASSESS-DOCD LE1/YR: CPT | Performed by: NURSE PRACTITIONER

## 2019-03-18 PROCEDURE — G8598 ASA/ANTIPLAT THER USED: HCPCS | Performed by: NURSE PRACTITIONER

## 2019-03-18 PROCEDURE — G8417 CALC BMI ABV UP PARAM F/U: HCPCS | Performed by: NURSE PRACTITIONER

## 2019-03-18 PROCEDURE — 4040F PNEUMOC VAC/ADMIN/RCVD: CPT | Performed by: NURSE PRACTITIONER

## 2019-03-18 PROCEDURE — 73562 X-RAY EXAM OF KNEE 3: CPT

## 2019-03-18 PROCEDURE — 99214 OFFICE O/P EST MOD 30 MIN: CPT | Performed by: NURSE PRACTITIONER

## 2019-03-18 PROCEDURE — G8427 DOCREV CUR MEDS BY ELIG CLIN: HCPCS | Performed by: NURSE PRACTITIONER

## 2019-03-18 RX ORDER — IRBESARTAN 150 MG/1
TABLET ORAL
Qty: 14 TABLET | Refills: 0 | Status: SHIPPED | OUTPATIENT
Start: 2019-03-18 | End: 2019-03-18

## 2019-03-18 RX ORDER — ERGOCALCIFEROL (VITAMIN D2) 1250 MCG
50000 CAPSULE ORAL WEEKLY
Qty: 12 CAPSULE | Refills: 3 | Status: SHIPPED | OUTPATIENT
Start: 2019-03-18 | End: 2019-04-17 | Stop reason: SDUPTHER

## 2019-03-18 RX ORDER — SUMATRIPTAN 50 MG/1
50 TABLET, FILM COATED ORAL 2 TIMES DAILY PRN
Qty: 180 TABLET | Refills: 1 | Status: SHIPPED | OUTPATIENT
Start: 2019-03-18 | End: 2019-10-14 | Stop reason: SDUPTHER

## 2019-03-18 RX ORDER — CARVEDILOL 6.25 MG/1
TABLET ORAL
Qty: 180 TABLET | Refills: 3 | Status: SHIPPED | OUTPATIENT
Start: 2019-03-18 | End: 2019-03-18

## 2019-03-18 RX ORDER — CEFDINIR 300 MG/1
300 CAPSULE ORAL 2 TIMES DAILY
Qty: 14 CAPSULE | Refills: 0 | Status: SHIPPED | OUTPATIENT
Start: 2019-03-18 | End: 2019-03-25

## 2019-03-18 RX ORDER — CARVEDILOL 12.5 MG/1
TABLET ORAL
Qty: 180 TABLET | Refills: 1 | Status: SHIPPED | OUTPATIENT
Start: 2019-03-18 | End: 2019-08-27 | Stop reason: SDUPTHER

## 2019-03-18 RX ORDER — CELECOXIB 200 MG/1
CAPSULE ORAL
Qty: 90 CAPSULE | Refills: 1 | Status: SHIPPED | OUTPATIENT
Start: 2019-03-18 | End: 2019-05-23 | Stop reason: ALTCHOICE

## 2019-03-18 ASSESSMENT — PATIENT HEALTH QUESTIONNAIRE - PHQ9
SUM OF ALL RESPONSES TO PHQ9 QUESTIONS 1 & 2: 0
2. FEELING DOWN, DEPRESSED OR HOPELESS: 0
SUM OF ALL RESPONSES TO PHQ QUESTIONS 1-9: 0
1. LITTLE INTEREST OR PLEASURE IN DOING THINGS: 0
SUM OF ALL RESPONSES TO PHQ QUESTIONS 1-9: 0

## 2019-03-18 ASSESSMENT — ENCOUNTER SYMPTOMS
NAUSEA: 0
TROUBLE SWALLOWING: 0
CHOKING: 0
BLOOD IN STOOL: 0
DIARRHEA: 0
VOMITING: 0
ABDOMINAL PAIN: 0
EYE DISCHARGE: 0
CONSTIPATION: 0
BACK PAIN: 1
COUGH: 0
SHORTNESS OF BREATH: 0
ABDOMINAL DISTENTION: 0
SORE THROAT: 0
COLOR CHANGE: 0
STRIDOR: 0
WHEEZING: 0
EYE ITCHING: 0

## 2019-03-20 LAB
ORGANISM: ABNORMAL
URINE CULTURE, ROUTINE: ABNORMAL
URINE CULTURE, ROUTINE: ABNORMAL

## 2019-03-29 ENCOUNTER — TELEPHONE (OUTPATIENT)
Dept: INTERNAL MEDICINE | Age: 79
End: 2019-03-29

## 2019-03-29 RX ORDER — CEFDINIR 300 MG/1
300 CAPSULE ORAL 2 TIMES DAILY
Qty: 14 CAPSULE | Refills: 0 | Status: SHIPPED | OUTPATIENT
Start: 2019-03-29 | End: 2019-04-05

## 2019-04-11 ENCOUNTER — APPOINTMENT (OUTPATIENT)
Dept: MRI IMAGING | Facility: HOSPITAL | Age: 79
End: 2019-04-11

## 2019-04-11 ENCOUNTER — APPOINTMENT (OUTPATIENT)
Dept: GENERAL RADIOLOGY | Facility: HOSPITAL | Age: 79
End: 2019-04-11

## 2019-04-11 ENCOUNTER — HOSPITAL ENCOUNTER (INPATIENT)
Facility: HOSPITAL | Age: 79
LOS: 2 days | Discharge: HOME HEALTH | End: 2019-04-13
Attending: EMERGENCY MEDICINE | Admitting: INTERNAL MEDICINE

## 2019-04-11 ENCOUNTER — APPOINTMENT (OUTPATIENT)
Dept: CT IMAGING | Facility: HOSPITAL | Age: 79
End: 2019-04-11

## 2019-04-11 DIAGNOSIS — A41.9 SEPSIS, DUE TO UNSPECIFIED ORGANISM: ICD-10-CM

## 2019-04-11 DIAGNOSIS — Z78.9 IMPAIRED MOBILITY AND ADLS: ICD-10-CM

## 2019-04-11 DIAGNOSIS — N17.9 ACUTE KIDNEY INJURY (HCC): ICD-10-CM

## 2019-04-11 DIAGNOSIS — M62.82 NON-TRAUMATIC RHABDOMYOLYSIS: Primary | ICD-10-CM

## 2019-04-11 DIAGNOSIS — Z74.09 IMPAIRED MOBILITY AND ADLS: ICD-10-CM

## 2019-04-11 DIAGNOSIS — R41.0 CONFUSION AND DISORIENTATION: ICD-10-CM

## 2019-04-11 DIAGNOSIS — Z74.09 IMPAIRED FUNCTIONAL MOBILITY, BALANCE, GAIT, AND ENDURANCE: ICD-10-CM

## 2019-04-11 DIAGNOSIS — M48.061 SPINAL STENOSIS, LUMBAR REGION, WITHOUT NEUROGENIC CLAUDICATION: ICD-10-CM

## 2019-04-11 DIAGNOSIS — G93.41 METABOLIC ENCEPHALOPATHY: ICD-10-CM

## 2019-04-11 DIAGNOSIS — N30.01 ACUTE CYSTITIS WITH HEMATURIA: ICD-10-CM

## 2019-04-11 PROBLEM — J96.01 ACUTE RESPIRATORY FAILURE WITH HYPOXIA AND HYPERCAPNIA (HCC): Status: ACTIVE | Noted: 2019-04-11

## 2019-04-11 PROBLEM — R65.10 SIRS (SYSTEMIC INFLAMMATORY RESPONSE SYNDROME) (HCC): Status: ACTIVE | Noted: 2019-04-11

## 2019-04-11 PROBLEM — J96.02 ACUTE RESPIRATORY FAILURE WITH HYPOXIA AND HYPERCAPNIA: Status: ACTIVE | Noted: 2019-04-11

## 2019-04-11 PROBLEM — R41.82 ALTERED MENTAL STATUS: Status: ACTIVE | Noted: 2019-04-11

## 2019-04-11 PROBLEM — N18.30 ACUTE RENAL FAILURE SUPERIMPOSED ON STAGE 3 CHRONIC KIDNEY DISEASE: Status: ACTIVE | Noted: 2019-04-11

## 2019-04-11 PROBLEM — Z91.199 MEDICAL NON-COMPLIANCE: Status: ACTIVE | Noted: 2019-04-11

## 2019-04-11 LAB
ALBUMIN SERPL-MCNC: 4 G/DL (ref 3.5–5)
ALBUMIN/GLOB SERPL: 1.5 G/DL (ref 1.1–2.5)
ALP SERPL-CCNC: 100 U/L (ref 24–120)
ALT SERPL W P-5'-P-CCNC: 17 U/L (ref 0–54)
AMMONIA BLD-SCNC: <9 UMOL/L (ref 9–33)
AMPHET+METHAMPHET UR QL: NEGATIVE
ANION GAP SERPL CALCULATED.3IONS-SCNC: 11 MMOL/L (ref 4–13)
ARTERIAL PATENCY WRIST A: POSITIVE
AST SERPL-CCNC: 49 U/L (ref 7–45)
ATMOSPHERIC PRESS: 743 MMHG
BACTERIA UR QL AUTO: ABNORMAL /HPF
BARBITURATES UR QL SCN: NEGATIVE
BASE EXCESS BLDA CALC-SCNC: -4.9 MMOL/L (ref 0–2)
BASOPHILS # BLD AUTO: 0.02 10*3/MM3 (ref 0–0.2)
BASOPHILS NFR BLD AUTO: 0.2 % (ref 0–2)
BDY SITE: ABNORMAL
BENZODIAZ UR QL SCN: POSITIVE
BILIRUB SERPL-MCNC: 0.7 MG/DL (ref 0.1–1)
BILIRUB UR QL STRIP: NEGATIVE
BODY TEMPERATURE: 37 C
BUN BLD-MCNC: 45 MG/DL (ref 5–21)
BUN/CREAT SERPL: 19.9 (ref 7–25)
CALCIUM SPEC-SCNC: 9.3 MG/DL (ref 8.4–10.4)
CANNABINOIDS SERPL QL: NEGATIVE
CHLORIDE SERPL-SCNC: 101 MMOL/L (ref 98–110)
CK SERPL-CCNC: 1295 U/L (ref 0–203)
CLARITY UR: CLEAR
CO2 SERPL-SCNC: 24 MMOL/L (ref 24–31)
COCAINE UR QL: NEGATIVE
COLOR UR: ABNORMAL
CREAT BLD-MCNC: 2.26 MG/DL (ref 0.5–1.4)
D DIMER PPP FEU-MCNC: 0.75 MG/L (FEU) (ref 0–0.5)
D-LACTATE SERPL-SCNC: 0.9 MMOL/L (ref 0.5–2)
DEPRECATED RDW RBC AUTO: 43 FL (ref 40–54)
EOSINOPHIL # BLD AUTO: 0.07 10*3/MM3 (ref 0–0.7)
EOSINOPHIL NFR BLD AUTO: 0.7 % (ref 0–4)
ERYTHROCYTE [DISTWIDTH] IN BLOOD BY AUTOMATED COUNT: 13.5 % (ref 12–15)
ETHANOL UR QL: <0.01 %
FINE GRAN CASTS URNS QL MICRO: ABNORMAL /LPF
FLUAV AG NPH QL: NEGATIVE
FLUBV AG NPH QL IA: NEGATIVE
GAS FLOW AIRWAY: 2 LPM
GFR SERPL CREATININE-BSD FRML MDRD: 21 ML/MIN/1.73
GLOBULIN UR ELPH-MCNC: 2.7 GM/DL
GLUCOSE BLD-MCNC: 107 MG/DL (ref 70–100)
GLUCOSE UR STRIP-MCNC: NEGATIVE MG/DL
HCO3 BLDA-SCNC: 22.3 MMOL/L (ref 20–26)
HCT VFR BLD AUTO: 32.2 % (ref 37–47)
HGB BLD-MCNC: 10.6 G/DL (ref 12–16)
HGB UR QL STRIP.AUTO: ABNORMAL
HYALINE CASTS UR QL AUTO: ABNORMAL /LPF
IMM GRANULOCYTES # BLD AUTO: 0.09 10*3/MM3 (ref 0–0.05)
IMM GRANULOCYTES NFR BLD AUTO: 1 % (ref 0–5)
KETONES UR QL STRIP: NEGATIVE
LEUKOCYTE ESTERASE UR QL STRIP.AUTO: ABNORMAL
LIPASE SERPL-CCNC: 33 U/L (ref 23–203)
LYMPHOCYTES # BLD AUTO: 1.2 10*3/MM3 (ref 0.72–4.86)
LYMPHOCYTES NFR BLD AUTO: 12.8 % (ref 15–45)
Lab: ABNORMAL
MAGNESIUM SERPL-MCNC: 2.3 MG/DL (ref 1.4–2.2)
MCH RBC QN AUTO: 28.9 PG (ref 28–32)
MCHC RBC AUTO-ENTMCNC: 32.9 G/DL (ref 33–36)
MCV RBC AUTO: 87.7 FL (ref 82–98)
METHADONE UR QL SCN: NEGATIVE
MODALITY: ABNORMAL
MONOCYTES # BLD AUTO: 0.93 10*3/MM3 (ref 0.19–1.3)
MONOCYTES NFR BLD AUTO: 10 % (ref 4–12)
MYOGLOBIN SERPL-MCNC: 3402 NG/ML (ref 0–110)
NEUTROPHILS # BLD AUTO: 7.03 10*3/MM3 (ref 1.87–8.4)
NEUTROPHILS NFR BLD AUTO: 75.3 % (ref 39–78)
NITRITE UR QL STRIP: NEGATIVE
NRBC BLD AUTO-RTO: 0 /100 WBC (ref 0–0)
NT-PROBNP SERPL-MCNC: 1660 PG/ML (ref 0–1800)
OPIATES UR QL: POSITIVE
PCO2 BLDA: 49.9 MM HG (ref 35–45)
PCP UR QL SCN: NEGATIVE
PH BLDA: 7.26 PH UNITS (ref 7.35–7.45)
PH UR STRIP.AUTO: <=5 [PH] (ref 5–8)
PLATELET # BLD AUTO: 178 10*3/MM3 (ref 130–400)
PMV BLD AUTO: 10.6 FL (ref 6–12)
PO2 BLDA: 66.3 MM HG (ref 83–108)
POTASSIUM BLD-SCNC: 4.5 MMOL/L (ref 3.5–5.3)
PROCALCITONIN SERPL-MCNC: 0.45 NG/ML
PROT SERPL-MCNC: 6.7 G/DL (ref 6.3–8.7)
PROT UR QL STRIP: ABNORMAL
RBC # BLD AUTO: 3.67 10*6/MM3 (ref 4.2–5.4)
RBC # UR: ABNORMAL /HPF
REF LAB TEST METHOD: ABNORMAL
SAO2 % BLDCOA: 93.6 % (ref 94–99)
SODIUM BLD-SCNC: 136 MMOL/L (ref 135–145)
SP GR UR STRIP: 1.02 (ref 1–1.03)
SQUAMOUS #/AREA URNS HPF: ABNORMAL /HPF
T4 FREE SERPL-MCNC: 1.24 NG/DL (ref 0.78–2.19)
TROPONIN I SERPL-MCNC: 0.13 NG/ML (ref 0–0.03)
TSH SERPL DL<=0.05 MIU/L-ACNC: 0.94 MIU/ML (ref 0.47–4.68)
UROBILINOGEN UR QL STRIP: ABNORMAL
VENTILATOR MODE: ABNORMAL
WBC NRBC COR # BLD: 9.34 10*3/MM3 (ref 4.8–10.8)
WBC UR QL AUTO: ABNORMAL /HPF
YEAST URNS QL MICRO: ABNORMAL /HPF

## 2019-04-11 PROCEDURE — 80307 DRUG TEST PRSMV CHEM ANLYZR: CPT | Performed by: EMERGENCY MEDICINE

## 2019-04-11 PROCEDURE — 93005 ELECTROCARDIOGRAM TRACING: CPT | Performed by: EMERGENCY MEDICINE

## 2019-04-11 PROCEDURE — 94799 UNLISTED PULMONARY SVC/PX: CPT

## 2019-04-11 PROCEDURE — 70450 CT HEAD/BRAIN W/O DYE: CPT

## 2019-04-11 PROCEDURE — 25010000002 CEFTRIAXONE PER 250 MG: Performed by: EMERGENCY MEDICINE

## 2019-04-11 PROCEDURE — 87804 INFLUENZA ASSAY W/OPTIC: CPT | Performed by: EMERGENCY MEDICINE

## 2019-04-11 PROCEDURE — 87086 URINE CULTURE/COLONY COUNT: CPT | Performed by: EMERGENCY MEDICINE

## 2019-04-11 PROCEDURE — 84145 PROCALCITONIN (PCT): CPT | Performed by: EMERGENCY MEDICINE

## 2019-04-11 PROCEDURE — 83735 ASSAY OF MAGNESIUM: CPT | Performed by: EMERGENCY MEDICINE

## 2019-04-11 PROCEDURE — 99285 EMERGENCY DEPT VISIT HI MDM: CPT

## 2019-04-11 PROCEDURE — 82803 BLOOD GASES ANY COMBINATION: CPT

## 2019-04-11 PROCEDURE — 84443 ASSAY THYROID STIM HORMONE: CPT | Performed by: EMERGENCY MEDICINE

## 2019-04-11 PROCEDURE — 94760 N-INVAS EAR/PLS OXIMETRY 1: CPT

## 2019-04-11 PROCEDURE — 93010 ELECTROCARDIOGRAM REPORT: CPT | Performed by: INTERNAL MEDICINE

## 2019-04-11 PROCEDURE — 81001 URINALYSIS AUTO W/SCOPE: CPT | Performed by: EMERGENCY MEDICINE

## 2019-04-11 PROCEDURE — 82140 ASSAY OF AMMONIA: CPT | Performed by: EMERGENCY MEDICINE

## 2019-04-11 PROCEDURE — 87040 BLOOD CULTURE FOR BACTERIA: CPT | Performed by: EMERGENCY MEDICINE

## 2019-04-11 PROCEDURE — 84439 ASSAY OF FREE THYROXINE: CPT | Performed by: EMERGENCY MEDICINE

## 2019-04-11 PROCEDURE — 83880 ASSAY OF NATRIURETIC PEPTIDE: CPT | Performed by: EMERGENCY MEDICINE

## 2019-04-11 PROCEDURE — 85025 COMPLETE CBC W/AUTO DIFF WBC: CPT | Performed by: EMERGENCY MEDICINE

## 2019-04-11 PROCEDURE — 83605 ASSAY OF LACTIC ACID: CPT | Performed by: EMERGENCY MEDICINE

## 2019-04-11 PROCEDURE — 36600 WITHDRAWAL OF ARTERIAL BLOOD: CPT

## 2019-04-11 PROCEDURE — 74018 RADEX ABDOMEN 1 VIEW: CPT

## 2019-04-11 PROCEDURE — 82550 ASSAY OF CK (CPK): CPT | Performed by: EMERGENCY MEDICINE

## 2019-04-11 PROCEDURE — 83690 ASSAY OF LIPASE: CPT | Performed by: EMERGENCY MEDICINE

## 2019-04-11 PROCEDURE — 71045 X-RAY EXAM CHEST 1 VIEW: CPT

## 2019-04-11 PROCEDURE — 85379 FIBRIN DEGRADATION QUANT: CPT | Performed by: EMERGENCY MEDICINE

## 2019-04-11 PROCEDURE — 83874 ASSAY OF MYOGLOBIN: CPT | Performed by: EMERGENCY MEDICINE

## 2019-04-11 PROCEDURE — 84484 ASSAY OF TROPONIN QUANT: CPT | Performed by: EMERGENCY MEDICINE

## 2019-04-11 PROCEDURE — 80053 COMPREHEN METABOLIC PANEL: CPT | Performed by: EMERGENCY MEDICINE

## 2019-04-11 PROCEDURE — P9612 CATHETERIZE FOR URINE SPEC: HCPCS

## 2019-04-11 PROCEDURE — 72148 MRI LUMBAR SPINE W/O DYE: CPT

## 2019-04-11 RX ORDER — SODIUM CHLORIDE 0.9 % (FLUSH) 0.9 %
3 SYRINGE (ML) INJECTION EVERY 12 HOURS SCHEDULED
Status: DISCONTINUED | OUTPATIENT
Start: 2019-04-11 | End: 2019-04-13 | Stop reason: HOSPADM

## 2019-04-11 RX ORDER — SODIUM CHLORIDE 0.9 % (FLUSH) 0.9 %
3-10 SYRINGE (ML) INJECTION AS NEEDED
Status: DISCONTINUED | OUTPATIENT
Start: 2019-04-11 | End: 2019-04-13 | Stop reason: HOSPADM

## 2019-04-11 RX ORDER — LOSARTAN POTASSIUM 50 MG/1
50 TABLET ORAL
Status: DISCONTINUED | OUTPATIENT
Start: 2019-04-11 | End: 2019-04-13 | Stop reason: HOSPADM

## 2019-04-11 RX ORDER — LEVOTHYROXINE SODIUM 0.05 MG/1
50 TABLET ORAL EVERY MORNING
Status: DISCONTINUED | OUTPATIENT
Start: 2019-04-12 | End: 2019-04-13 | Stop reason: HOSPADM

## 2019-04-11 RX ORDER — CYCLOBENZAPRINE HCL 10 MG
10 TABLET ORAL 3 TIMES DAILY PRN
COMMUNITY
End: 2022-08-21 | Stop reason: HOSPADM

## 2019-04-11 RX ORDER — SODIUM CHLORIDE 0.9 % (FLUSH) 0.9 %
10 SYRINGE (ML) INJECTION AS NEEDED
Status: DISCONTINUED | OUTPATIENT
Start: 2019-04-11 | End: 2019-04-13 | Stop reason: HOSPADM

## 2019-04-11 RX ORDER — DILTIAZEM HYDROCHLORIDE 240 MG/1
240 CAPSULE, COATED, EXTENDED RELEASE ORAL
Status: DISCONTINUED | OUTPATIENT
Start: 2019-04-11 | End: 2019-04-13 | Stop reason: HOSPADM

## 2019-04-11 RX ORDER — CARVEDILOL 6.25 MG/1
12.5 TABLET ORAL 2 TIMES DAILY WITH MEALS
Status: DISCONTINUED | OUTPATIENT
Start: 2019-04-11 | End: 2019-04-13 | Stop reason: HOSPADM

## 2019-04-11 RX ORDER — LEVOTHYROXINE SODIUM 0.05 MG/1
50 TABLET ORAL EVERY MORNING
COMMUNITY

## 2019-04-11 RX ORDER — OXYCODONE HYDROCHLORIDE 5 MG/1
15 TABLET ORAL EVERY 6 HOURS PRN
Status: DISCONTINUED | OUTPATIENT
Start: 2019-04-11 | End: 2019-04-13 | Stop reason: HOSPADM

## 2019-04-11 RX ORDER — ACETAMINOPHEN 325 MG/1
650 TABLET ORAL EVERY 4 HOURS PRN
Status: DISCONTINUED | OUTPATIENT
Start: 2019-04-11 | End: 2019-04-13 | Stop reason: HOSPADM

## 2019-04-11 RX ORDER — CELECOXIB 100 MG/1
100 CAPSULE ORAL TAKE AS DIRECTED
COMMUNITY
End: 2019-07-16

## 2019-04-11 RX ORDER — CARVEDILOL 12.5 MG/1
12.5 TABLET ORAL 2 TIMES DAILY WITH MEALS
COMMUNITY
End: 2019-07-29

## 2019-04-11 RX ORDER — PANTOPRAZOLE SODIUM 40 MG/1
40 TABLET, DELAYED RELEASE ORAL EVERY MORNING
Status: DISCONTINUED | OUTPATIENT
Start: 2019-04-12 | End: 2019-04-13 | Stop reason: HOSPADM

## 2019-04-11 RX ORDER — CYCLOBENZAPRINE HCL 10 MG
10 TABLET ORAL 2 TIMES DAILY PRN
Status: DISCONTINUED | OUTPATIENT
Start: 2019-04-11 | End: 2019-04-13 | Stop reason: HOSPADM

## 2019-04-11 RX ORDER — SODIUM CHLORIDE 9 MG/ML
75 INJECTION, SOLUTION INTRAVENOUS CONTINUOUS
Status: DISCONTINUED | OUTPATIENT
Start: 2019-04-11 | End: 2019-04-13 | Stop reason: HOSPADM

## 2019-04-11 RX ORDER — ONDANSETRON 2 MG/ML
4 INJECTION INTRAMUSCULAR; INTRAVENOUS EVERY 6 HOURS PRN
Status: DISCONTINUED | OUTPATIENT
Start: 2019-04-11 | End: 2019-04-13 | Stop reason: HOSPADM

## 2019-04-11 RX ORDER — ATORVASTATIN CALCIUM 40 MG/1
40 TABLET, FILM COATED ORAL DAILY
Status: DISCONTINUED | OUTPATIENT
Start: 2019-04-11 | End: 2019-04-13 | Stop reason: HOSPADM

## 2019-04-11 RX ORDER — ONDANSETRON 4 MG/1
4 TABLET, FILM COATED ORAL EVERY 6 HOURS PRN
Status: DISCONTINUED | OUTPATIENT
Start: 2019-04-11 | End: 2019-04-13 | Stop reason: HOSPADM

## 2019-04-11 RX ORDER — ASPIRIN 325 MG
325 TABLET ORAL ONCE
Status: COMPLETED | OUTPATIENT
Start: 2019-04-11 | End: 2019-04-11

## 2019-04-11 RX ADMIN — ASPIRIN 325 MG: 325 TABLET, COATED ORAL at 12:40

## 2019-04-11 RX ADMIN — CARVEDILOL 12.5 MG: 6.25 TABLET, FILM COATED ORAL at 18:36

## 2019-04-11 RX ADMIN — SODIUM CHLORIDE 500 ML: 9 INJECTION, SOLUTION INTRAVENOUS at 11:09

## 2019-04-11 RX ADMIN — CARBIDOPA AND LEVODOPA 1 TABLET: 25; 100 TABLET ORAL at 18:36

## 2019-04-11 RX ADMIN — CEFTRIAXONE SODIUM 1 G: 1 INJECTION, POWDER, FOR SOLUTION INTRAMUSCULAR; INTRAVENOUS at 11:58

## 2019-04-11 RX ADMIN — CARBIDOPA AND LEVODOPA 1 TABLET: 25; 100 TABLET ORAL at 21:47

## 2019-04-11 RX ADMIN — SODIUM CHLORIDE, PRESERVATIVE FREE 3 ML: 5 INJECTION INTRAVENOUS at 21:47

## 2019-04-11 RX ADMIN — DESMOPRESSIN ACETATE 40 MG: 0.2 TABLET ORAL at 16:36

## 2019-04-11 RX ADMIN — LOSARTAN POTASSIUM 50 MG: 50 TABLET, FILM COATED ORAL at 16:36

## 2019-04-11 RX ADMIN — DILTIAZEM HYDROCHLORIDE 240 MG: 240 CAPSULE, EXTENDED RELEASE ORAL at 16:36

## 2019-04-11 RX ADMIN — SODIUM CHLORIDE 125 ML/HR: 9 INJECTION, SOLUTION INTRAVENOUS at 16:30

## 2019-04-11 RX ADMIN — SODIUM CHLORIDE 1848 ML: 9 INJECTION, SOLUTION INTRAVENOUS at 12:00

## 2019-04-11 NOTE — H&P
"    [facility] Medicine Services  HISTORY AND PHYSICAL    Date of Admission: 4/11/2019  Primary Care Physician: Zaire Uriarte MD    Subjective     Chief Complaint: Altered mental status    History of Present Illness  Jennifer Gomes is a 78-year-old female with a past medical history positive for chronic kidney disease stage III, hypertension, hyper lipidemia, lumbar stenosis, restless leg syndrome uterine cancer chronic pain syndrome.  HPI was obtained from patient's daughter at bedside.  Apparently for the past 2 years, intermittently, patient has had episodes of altered mental status with inability to walk.  Most recent episode was Tuesday at which time while in the car she became obtunded and sat for 4 hours with family trying to awaken her.  They subsequently got her out of the car and it took 2 hours to get her into the house.  When asked why EMS was not called at that time daughter stated \"she has done this before and her doctor was aware, and they did nothing\".  Patient was able to utilize 2 canes to shuffle from her bedroom to bathroom since Tuesday.  Yesterday at approximately 3 PM she walked to the living room and sat on the couch and again became extremely obtunded, they were unable to get her awake.  Daughter states they checked on her multiple times and she was asleep every time leaning to the left and today they did call EMS who transferred patient to emergency department.  Currently patient is awake.  She is confused to location however she was able to answer most questions.  Her complaint is severe hip, back and lower extremity pain stating \"if I could get up I would feel better\".  Currently she is unable to move her lower extremities.  Patient denies shortness of breathing however she is on 4 L nasal cannula with 95% saturation.  Heart rate 114.  Patient denies abdominal pain.  Patient was treated for E. coli UTI 3/18/2019, repeat urinalysis negative for acute " findings.  ER evaluation revealed rhabdomyolysis, acute kidney injury and hypoxia on ABG.  Daughter reports patient does not follow recommendations regarding pain medication and frequently takes too many.  Patient is admitted as inpatient for further evaluation and treatment.    Review of Systems   A 10 point review of systems was completed, all negative except for those discussed in HPI    Past Medical History:   Past Medical History:   Diagnosis Date   • Anxiety    • Arthritis    • Cancer (CMS/HCC)     uterine   • Chronic pain    • Depression    • Disease of thyroid gland    • Fibromyalgia    • Headache    • Hyperlipidemia    • Hypertension    • Incontinence    • Insomnia    • Leg pain    • Lumbar stenosis    • Peptic ulcer    • Restless legs    • Vaginal bleeding        Past Surgical History:   Past Surgical History:   Procedure Laterality Date   • BLADDER REPAIR  2011    MESH HAD TO BE REMOVED IN 2013   • BREAST BIOPSY Right 2017    benign   • BREAST CYST EXCISION Left 1982   • CARPAL TUNNEL RELEASE     • CATARACT EXTRACTION W/ INTRAOCULAR LENS  IMPLANT, BILATERAL     • CYSTECTOMY     • D&C HYSTEROSCOPY N/A 11/6/2017    Procedure: DILATATION AND CURETTAGE HYSTEROSCOPY;  Surgeon: Shasta Madrigal MD;  Location: [facility] OR;  Service:    • DILATION AND CURETTAGE, DIAGNOSTIC / THERAPEUTIC  2008   • ENDOSCOPY  09/23/2010    Short segment of Arriola's,Moderate chroninc esophagogastritis and negative H.pylori   • ENDOSCOPY N/A 9/25/2017    Procedure: ESOPHAGOGASTRODUODENOSCOPY WITH ANESTHESIA;  Surgeon: Tom Velasco DO;  Location: [facility] ENDOSCOPY;  Service:    • HYSTERECTOMY  12/20/2017   • ORIF TIBIA/FIBULA FRACTURES Left 2000   • TRANSVAGINAL TAPING SUSPENSION N/A 11/6/2017    Procedure: VAGINAL MESH REVISION;  Surgeon: Shasta Madrigal MD;  Location: [facility] OR;  Service:    • VAGINAL MESH REVISION  2013       Family History: family history includes Cancer in her paternal grandmother; Diabetes in her mother and  sister; Lung cancer in her paternal grandfather; Lymphoma in her brother; Multiple myeloma in her mother; Ovarian cancer in her paternal aunt; Prostate cancer in her brother.    Social History:  reports that she has never smoked. She has never used smokeless tobacco. She reports that she does not drink alcohol or use drugs.    Code Status: Full, her family will speak for her if unable speak for herself      Allergies:  Allergies   Allergen Reactions   • Ropinirole Hcl Shortness Of Breath   • Codeine Itching and Mental Status Change   • Ambien [Zolpidem] Other (See Comments)     HYPER    • Eszopiclone Other (See Comments)     MAKES PT HYPER    • Pregabalin Dizziness   • Tizanidine Other (See Comments)     Terrible nightmares       Medications:  Prior to Admission medications    Medication Sig Start Date End Date Taking? Authorizing Provider   atorvastatin (LIPITOR) 40 MG tablet Take 40 mg by mouth Daily.   Yes Tamiko Gaviria MD   carbidopa-levodopa (SINEMET)  MG per tablet Take 1 tablet by mouth 4 (Four) Times a Day. 6/13/17  Yes Tamiko Gaviria MD   carvedilol (COREG) 12.5 MG tablet Take 12.5 mg by mouth 2 (Two) Times a Day With Meals.   Yes Tamiko Gaviria MD   celecoxib (CeleBREX) 100 MG capsule Take 100 mg by mouth Take As Directed. Take 1 capsule 3 to 4 times weekly as needed for inflammation.   Yes Tamiko Gaviria MD   cyclobenzaprine (FLEXERIL) 10 MG tablet Take 10 mg by mouth 2 (Two) Times a Day As Needed for Muscle Spasms.   Yes Tamiko Gaviria MD   diltiazem XR (DILACOR XR) 240 MG 24 hr capsule Take 240 mg by mouth Every Night. 7/15/15  Yes Tamiko Gaviria MD   irbesartan (AVAPRO) 150 MG tablet Take 150 mg by mouth Daily.   Yes Tamiko Gaviria MD   lansoprazole (PREVACID) 30 MG capsule Take 30 mg by mouth Daily.   Yes Tamiko Gaviria MD   levothyroxine (SYNTHROID, LEVOTHROID) 50 MCG tablet Take 50 mcg by mouth Every Morning.   Yes Tamiko Gaviria  "MD   oxyCODONE HCl 15 MG tablet  Take 15 mg by mouth 4 (Four) Times a Day As Needed for Moderate Pain . 8/9/17  Yes Tamiko Gaviria MD   SUMAtriptan (IMITREX) 50 MG tablet Take 50 mg by mouth 2 (Two) Times a Day As Needed for Migraine. 6/30/17  Yes Tamiko Gaviria MD   APAP-isometheptene-dichloral -325 MG per capsule Take 1 capsule by mouth 4 (Four) Times a Day As Needed for Headache.  4/11/19  Tamiko Gaviria MD   butalbital-aspirin-caffeine (FIORINAL) -40 MG capsule Take 1 capsule by mouth Every 4 (Four) Hours As Needed for Headache. 6/20/17 4/11/19  Tamiko Gaviria MD   carvedilol (COREG) 6.25 MG tablet TAKE 1 TABLET TWICE A DAY 6/21/17 4/11/19  Tamiko Gaviria MD   celecoxib (CeleBREX) 100 MG capsule TAKE 1 CAPSULE 3 TO 4 TIMES WEEKLY AS NEEDED (NEW DOSE, DECREASED MG PER DR ESTRADA) 5/14/18 4/11/19  Tamiko Gaviria MD   FIBER FORMULA capsule Take 2 tablets by mouth Daily. 3/7/16 4/11/19  Tamiko Gaviria MD   levothyroxine (SYNTHROID, LEVOTHROID) 50 MCG tablet 1 tablet daily 6/10/17 4/11/19  Tamiko Gaviria MD   vitamin D (ERGOCALCIFEROL) 82098 units capsule capsule Take 50,000 Units by mouth 1 (One) Time Per Week.  4/11/19  Tamiko Gaviria MD       Objective     /68 (BP Location: Right arm, Patient Position: Lying)   Pulse (!) 121   Temp 98.4 °F (36.9 °C) (Oral)   Resp 20   Ht 172.5 cm (67.91\")   Wt 92.2 kg (203 lb 5 oz)   SpO2 97%   Breastfeeding? No   BMI 30.99 kg/m²   Physical Exam   Constitutional: She appears well-developed and well-nourished. No distress.   HENT:   Head: Normocephalic and atraumatic.   Eyes: Conjunctivae and EOM are normal. Pupils are equal, round, and reactive to light. No scleral icterus.   Neck: Normal range of motion. Neck supple. No JVD present. No tracheal deviation present.   Cardiovascular: Regular rhythm, normal heart sounds and intact distal pulses. Exam reveals no gallop.   No murmur " heard.  Tachycardia 110 120   Pulmonary/Chest: Effort normal and breath sounds normal. No respiratory distress. She has no wheezes. She has no rales.   Abdominal: Soft. Bowel sounds are normal. She exhibits no distension. There is no tenderness. There is no guarding.   Musculoskeletal: Normal range of motion. She exhibits no edema.   Unable to move lower extremities, trying to sit up on stretcher was unable to due to severe pain and weakness   Neurological: She is alert.   Patient thinks she is in the hospital in [place].  However she does follow commands and answers other questions appropriately   Skin: Skin is warm and dry. No rash noted. She is not diaphoretic. No erythema. No pallor.   Psychiatric: She has a normal mood and affect. Her behavior is normal.   Vitals reviewed.      Pertinent Data:   Lab Results (last 72 hours)     Procedure Component Value Units Date/Time    Procalcitonin [[id number]]  (Abnormal) Collected:  04/11/19 1110    Specimen:  Blood Updated:  04/11/19 1229     Procalcitonin 0.45 ng/mL     Narrative:       SIRS, sepsis, severe sepsis, and septic shock are categorized according to the criteria of the consensus conference of the American College of Chest Physicians/Society of Critical Care Medicine.    PCT < 0.5 ng/mL     Systemic infection (sepsis) is not likely.    PCT >0.5 and < 2.0 ng/mL Systemic infection (sepsis) is possible, but other conditions are known to elevate PCT as well.    PCT > 2.0 ng/mL     Systemic infection (sepsis) is likely, unless other causes are known.      PCT > 10.0 ng/mL    Important systemic inflammatory response, almost exclusively due to severe bacterial sepsis or septic shock.    PCT values of < 0.5 ng/mL do not exclude an infection, because localized infections (without systemic signs) may be associated with such low concentrations, or a systemic infection in its initial stages (<6 hours).  Increased PCT can occur without infection.  PCT concentrations  between 0.5 and 2.0 ng/mL should be interpreted taking into account the patients history.  It is recommended to retest PCT within 6-24 hours if any concentrations < 2.0 ng/mL are obtained.    Lactic Acid, Plasma [[id number]]  (Normal) Collected:  04/11/19 1110    Specimen:  Blood Updated:  04/11/19 1217     Lactate 0.9 mmol/L     Myoglobin, Serum [[id number]]  (Abnormal) Collected:  04/11/19 1110    Specimen:  Blood Updated:  04/11/19 1212     Myoglobin 3,402.0 ng/mL     TSH [[id number]]  (Normal) Collected:  04/11/19 1110    Specimen:  Blood Updated:  04/11/19 1208     TSH 0.936 mIU/mL     Urine Drug Screen - Urine, Catheter [[id number]]  (Abnormal) Collected:  04/11/19 1058    Specimen:  Urine, Catheter Updated:  04/11/19 1207     Amphetamine Screen, Urine Negative     Barbiturates Screen, Urine Negative     Benzodiazepine Screen, Urine Positive     Cocaine Screen, Urine Negative     Methadone Screen, Urine Negative     Opiate Screen Positive     Phencyclidine (PCP), Urine Negative     THC, Screen, Urine Negative    Narrative:       Negative Thresholds For Drugs Screened in Urine:    Amphetamines          500 ng/ml  Barbiturates          200 ng/ml  Benzodiazepines       200 ng/ml  Cocaine               150 ng/ml  Methadone             150 ng/ml  Opiates               300 ng/ml  Phencyclidine         25 ng/ml  THC                      50 ng/ml    The normal value for all drugs tested is negative. This report includes final unconfirmed screening results.  A positive result by this assay can be, at your request, sent to the Reference Lab for confirmation by gas chromatography. Unconfirmed results must not be used for non-medical purposes, such as employment or legal testing. Clinical consideration should be applied to any drug of abuse test result, particularly when unconfirmed results are used.    T4, Free [[id number]]  (Normal) Collected:  04/11/19 1110    Specimen:  Blood Updated:  04/11/19 1154     Free T4 1.24 ng/dL      Urinalysis With Culture If Indicated - Urine, Catheter In/Out [[id number]]  (Abnormal) Collected:  04/11/19 1058    Specimen:  Urine, Catheter In/Out Updated:  04/11/19 1154     Color, UA Dark Yellow     Appearance, UA Clear     pH, UA <=5.0     Specific Gravity, UA 1.021     Glucose, UA Negative     Ketones, UA Negative     Bilirubin, UA Negative     Blood, UA Moderate (2+)     Protein, UA Trace     Leuk Esterase, UA Small (1+)     Nitrite, UA Negative     Urobilinogen, UA 0.2 E.U./dL    Urinalysis, Microscopic Only - Urine, Catheter In/Out [[id number]]  (Abnormal) Collected:  04/11/19 1058    Specimen:  Urine, Catheter In/Out Updated:  04/11/19 1154     RBC, UA 0-2 /HPF      WBC, UA 6-12 /HPF      Bacteria, UA None Seen /HPF      Squamous Epithelial Cells, UA 3-6 /HPF      Yeast, UA       Small/1+ Budding Yeast w/Hyphae     /HPF     Hyaline Casts, UA 0-2 /LPF      Fine Granular Casts, UA 0-2 /LPF      Methodology Manual Light Microscopy    Urine Culture - Urine, Urine, Catheter In/Out [[id number]] Collected:  04/11/19 1058    Specimen:  Urine, Catheter In/Out Updated:  04/11/19 1154    BNP [[id number]]  (Normal) Collected:  04/11/19 1110    Specimen:  Blood Updated:  04/11/19 1148     proBNP 1,660.0 pg/mL     Troponin [[id number]]  (Abnormal) Collected:  04/11/19 1110    Specimen:  Blood Updated:  04/11/19 1148     Troponin I 0.131 ng/mL     Influenza Antigen, Rapid - Swab, Nasopharynx [[id number]]  (Normal) Collected:  04/11/19 1107    Specimen:  Swab from Nasopharynx Updated:  04/11/19 1141     Influenza A Ag, EIA Negative     Influenza B Ag, EIA Negative    Narrative:       Recommend confirmation of negative results by viral culture or molecular assay.    Ammonia [[id number]]  (Abnormal) Collected:  04/11/19 1110    Specimen:  Blood Updated:  04/11/19 1137     Ammonia <9 umol/L     Lipase [[id number]]  (Normal) Collected:  04/11/19 1110    Specimen:  Blood Updated:  04/11/19 1136     Lipase 33 U/L     Comprehensive  Metabolic Panel [[id number]]  (Abnormal) Collected:  04/11/19 1110    Specimen:  Blood Updated:  04/11/19 1136     Glucose 107 mg/dL      BUN 45 mg/dL      Creatinine 2.26 mg/dL      Sodium 136 mmol/L      Potassium 4.5 mmol/L      Chloride 101 mmol/L      CO2 24.0 mmol/L      Calcium 9.3 mg/dL      Total Protein 6.7 g/dL      Albumin 4.00 g/dL      ALT (SGPT) 17 U/L      AST (SGOT) 49 U/L      Alkaline Phosphatase 100 U/L      Total Bilirubin 0.7 mg/dL      eGFR Non African Amer 21 mL/min/1.73      Globulin 2.7 gm/dL      A/G Ratio 1.5 g/dL      BUN/Creatinine Ratio 19.9     Anion Gap 11.0 mmol/L     Narrative:       GFR Normal >60  Chronic Kidney Disease <60  Kidney Failure <15    Ethanol [[id number]]  (Normal) Collected:  04/11/19 1110    Specimen:  Blood Updated:  04/11/19 1136     Ethanol % <0.010 %     Narrative:       Not for legal purposes. Chain of Custody not followed.     CK [[id number]]  (Abnormal) Collected:  04/11/19 1110    Specimen:  Blood Updated:  04/11/19 1136     Creatine Kinase 1,295 U/L     Magnesium [[id number]]  (Abnormal) Collected:  04/11/19 1110    Specimen:  Blood Updated:  04/11/19 1136     Magnesium 2.3 mg/dL     D-dimer, Quantitative [[id number]]  (Abnormal) Collected:  04/11/19 1110    Specimen:  Blood Updated:  04/11/19 1136     D-Dimer, Quantitative 0.75 mg/L (FEU)     Narrative:       Reference Range is 0-0.50 mg/L FEU. However, results <0.50 mg/L FEU tends to rule out DVT or PE. Results >0.50 mg/L FEU are not useful in predicting absence or presence of DVT or PE.    Blood Culture - Blood, Arm, Left [[id number]] Collected:  04/11/19 1110    Specimen:  Blood from Arm, Left Updated:  04/11/19 1128    Blood Culture - Blood, Arm, Right [[id number]] Collected:  04/11/19 1057    Specimen:  Blood from Arm, Right Updated:  04/11/19 1127    CBC Auto Differential [[id number]]  (Abnormal) Collected:  04/11/19 1110    Specimen:  Blood Updated:  04/11/19 1126     WBC 9.34 10*3/mm3      RBC 3.67  10*6/mm3      Hemoglobin 10.6 g/dL      Hematocrit 32.2 %      MCV 87.7 fL      MCH 28.9 pg      MCHC 32.9 g/dL      RDW 13.5 %      RDW-SD 43.0 fl      MPV 10.6 fL      Platelets 178 10*3/mm3      Neutrophil % 75.3 %      Lymphocyte % 12.8 %      Monocyte % 10.0 %      Eosinophil % 0.7 %      Basophil % 0.2 %      Immature Grans % 1.0 %      Neutrophils, Absolute 7.03 10*3/mm3      Lymphocytes, Absolute 1.20 10*3/mm3      Monocytes, Absolute 0.93 10*3/mm3      Eosinophils, Absolute 0.07 10*3/mm3      Basophils, Absolute 0.02 10*3/mm3      Immature Grans, Absolute 0.09 10*3/mm3      nRBC 0.0 /100 WBC     Blood Gas, Arterial [[id number]]  (Abnormal) Collected:  04/11/19 1055    Specimen:  Arterial Blood Updated:  04/11/19 1103     Site Right Radial     Donny's Test Positive     pH, Arterial 7.258 pH units      Comment: 84 Value below reference range        pCO2, Arterial 49.9 mm Hg      Comment: 83 Value above reference range        pO2, Arterial 66.3 mm Hg      Comment: 84 Value below reference range        HCO3, Arterial 22.3 mmol/L      Base Excess, Arterial -4.9 mmol/L      Comment: 84 Value below reference range        O2 Saturation, Arterial 93.6 %      Comment: 84 Value below reference range        Temperature 37.0 C      Barometric Pressure for Blood Gas 743 mmHg      Modality Nasal Cannula     Flow Rate 2.0 lpm      Ventilator Mode NA     Collected by [id number]     Comment: Meter: [id number]     :  [id number]           Imaging Results (last 24 hours)     Procedure Component Value Units Date/Time    XR Abdomen KUB [[id number]] Collected:  04/11/19 1339     Updated:  04/11/19 1344    Narrative:       EXAMINATION:  XR ABDOMEN KUB-  4/11/2019 1:10 PM CDT     HISTORY: abdominal pain; M62.82-Rhabdomyolysis; N17.9-Acute kidney  failure, unspecified; N30.01-Acute cystitis with hematuria;  A41.9-Sepsis, unspecified organism; F99-Mental disorder, not otherwise  specified      COMPARISON: No comparison study.      TECHNIQUE: Single view, 2 images: KUB     FINDINGS:      There is a large amount of stool observed throughout the colon without  dilated bowel loops to indicate obstruction. Constipation suspected.     There are multiple calcific densities in the pelvis, likely phleboliths.     There is no organomegaly.     Degenerative changes noted in the thoracolumbar spine with osteophyte  formation.       Impression:       1. Suspect constipation. No dilated bowel loops indicate obstruction.  This report was finalized on 04/11/2019 13:40 by Dr. Carlos Peralta MD.    CT Head Without Contrast [[id number]] Collected:  04/11/19 1215     Updated:  04/11/19 1226    Narrative:       EXAMINATION:  CT HEAD WO CONTRAST-  4/11/2019 12:05 PM CDT     HISTORY: Altered mental status.     TECHNIQUE: Multiple axial images were obtained through the brain without  contrast infusion. Multiplanar images were reconstructed.     DLP: 762 mGy-cm. Automated dosage control was utilized.     COMPARISON: 02/26/2013.     FINDINGS: There are no hemorrhage, edema or mass effect. There is mild  atrophy with associated ventricular prominence. There is low density in  the white matter. Vascular calcification is noted. The visualized  paranasal sinuses and mastoid air cells are clear. No calvarial fracture  is seen.       Impression:       1. No hemorrhage, edema or mass effect.  2. Mild atrophy with associated ventricular prominence.  3. Low density in the white matter is nonspecific and likely due to  chronic small vessel. Vascular calcification is noted.     The full report of this exam was immediately signed and available to the  emergency room. The patient is currently in the emergency room.     This report was finalized on 04/11/2019 12:23 by Dr. Rickey Win MD.    XR Chest 1 View [[id number]] Collected:  04/11/19 1157     Updated:  04/11/19 1202    Narrative:       History:  78-year-old with confusion and shortness of breath.     Reference:  Chest  radiograph October 2017.     Findings:  Frontal chest radiograph performed.     Normal heart size. The lungs are clear. No pleural fluid or  pneumothorax. No acute osseous abnormality. Degenerative changes of the  spine.          Impression:       No acute cardiopulmonary process.  This report was finalized on 04/11/2019 11:59 by Dr Partha Whitley, .          I have personally reviewed and interpreted the radiology studies and ECG obtained at time of admission.     Assessment / Plan     Assessment:   Active Hospital Problems    Diagnosis   • **Non-traumatic rhabdomyolysis   • Altered mental status   • Acute renal failure superimposed on stage 3 chronic kidney disease (CMS/HCC)   • Acute respiratory failure with hypoxia and hypercapnia (CMS/HCC)   • Medical non-compliance, does not take narcotics as prescribed   • SIRS (systemic inflammatory response syndrome) (CMS/HCC)   • Spinal stenosis, lumbar region, without neurogenic claudication       Plan:   1.  Admit as inpatient  2.  Continue Rocephin 1 g IV daily  3.  Hydrate with normal saline 125 mL/hour, patient did receive 2.5 L normal saline in the emergency department  4.  MRI lumbar spine without contrast today  5.  N.p.o. until cleared by speech therapy  6.  Labs in a.m. CBC, CK, CMP, A1c, lipid panel, serum myoglobin, troponin, TSH  7.  DVT prophylaxis with SCDs, cardiac monitoring, neurochecks, supplemental O2  8.  Consult PT OT due to inability to walk, consult   9.  Consider neurology consult  10.  Home medications reviewed and restarted as appropriate    I discussed the patient's findings and my recommendations with: Angel Hay DO  Time spent: 40 minutes       MORGAN Cody  04/11/19   4:34 PM     I personally evaluated and examined the patient in conjunction with MORGAN Cunningham and agree with the assessment, treatment plan, and disposition of the patient as recorded by her. My history, exam, and further  recommendations are:     Discussed with her nurse, Delilah.     Seen and discussed with her .    Patient is currently awake and alert.  Her mental status is reportedly better than it was in the emergency department.    Limit sedatives and narcotics as able.    Continue Rocephin for now.  Urine not that impressive for infection, but the patient does meet SIRS criteria.    MRI of the lumbar spine without contrast to further evaluate her ability to move her lower extremities effectively in the setting of known lumbar spinal stenosis.    Angel Hay DO  04/11/19  4:56 PM

## 2019-04-11 NOTE — ED PROVIDER NOTES
Subjective   This is a 78-year-old female who has been brought to the emergency department via EMS for evaluation of confusion and altered mentation.  Family is at bedside for additional history.  Since at least Tuesday she has had diffuse fatigue and some intermittent confusion and difficulty with speech.  They last saw her yesterday sitting in her chair.  When they saw her again this morning she was in the same position, had difficulty answering questions, was confused, and so EMS was contacted.  They report a low-grade fever.  Patient completed a course of antibiotics for a urinary tract infection around 2 weeks ago and just restarted an unknown antibiotic as she has had some recent dysuria.  Patient reports mild lower abdominal discomfort today.  No nausea or vomiting.  She has had poor oral intake.  They have not noticed a fever at home.  Patient reports a recent cough as well as shortness of breath.  She is unsure if she is wheezing.  No chest pain or palpitations.  She does not feel dizzy.  No recent stool changes.  She has a history of lower extremity swelling which is at baseline.  Family denies any new drooping of the face.  No new unilateral or focal neurologic deficits per family.  Patient denies any paresthesias or focal weakness.  Review of systems otherwise negative.  They have no additional concerns.            Review of Systems   All other systems reviewed and are negative.      Past Medical History:   Diagnosis Date   • Anxiety    • Arthritis    • Cancer (CMS/HCC)     uterine   • Chronic pain    • Depression    • Disease of thyroid gland    • Fibromyalgia    • Headache    • Hyperlipidemia    • Hypertension    • Incontinence    • Insomnia    • Leg pain    • Lumbar stenosis    • Peptic ulcer    • Restless legs    • Vaginal bleeding        Allergies   Allergen Reactions   • Ropinirole Hcl Shortness Of Breath   • Codeine Itching and Mental Status Change   • Ambien [Zolpidem] Other (See Comments)      HYPER    • Eszopiclone Other (See Comments)     MAKES PT HYPER    • Pregabalin Dizziness   • Tizanidine Other (See Comments)     Terrible nightmares       Past Surgical History:   Procedure Laterality Date   • BLADDER REPAIR  2011    MESH HAD TO BE REMOVED IN 2013   • BREAST BIOPSY Right 2017    benign   • BREAST CYST EXCISION Left 1982   • CARPAL TUNNEL RELEASE     • CATARACT EXTRACTION W/ INTRAOCULAR LENS  IMPLANT, BILATERAL     • CYSTECTOMY     • D&C HYSTEROSCOPY N/A 11/6/2017    Procedure: DILATATION AND CURETTAGE HYSTEROSCOPY;  Surgeon: Shasta Madrigal MD;  Location: [facility] OR;  Service:    • DILATION AND CURETTAGE, DIAGNOSTIC / THERAPEUTIC  2008   • ENDOSCOPY  09/23/2010    Short segment of Arriola's,Moderate chroninc esophagogastritis and negative H.pylori   • ENDOSCOPY N/A 9/25/2017    Procedure: ESOPHAGOGASTRODUODENOSCOPY WITH ANESTHESIA;  Surgeon: Tom Velasco DO;  Location: [facility] ENDOSCOPY;  Service:    • HYSTERECTOMY  12/20/2017   • ORIF TIBIA/FIBULA FRACTURES Left 2000   • TRANSVAGINAL TAPING SUSPENSION N/A 11/6/2017    Procedure: VAGINAL MESH REVISION;  Surgeon: Shasta Madrigal MD;  Location: [facility] OR;  Service:    • VAGINAL MESH REVISION  2013       Family History   Problem Relation Age of Onset   • Diabetes Mother    • Multiple myeloma Mother    • Diabetes Sister    • Ovarian cancer Paternal Aunt    • Prostate cancer Brother    • Lymphoma Brother         NHL   • Cancer Paternal Grandmother         metastatic   • Lung cancer Paternal Grandfather    • Colon cancer Neg Hx    • Esophageal cancer Neg Hx    • Breast cancer Neg Hx        Social History     Socioeconomic History   • Marital status:      Spouse name: Not on file   • Number of children: Not on file   • Years of education: Not on file   • Highest education level: Not on file   Tobacco Use   • Smoking status: Never Smoker   • Smokeless tobacco: Never Used   Substance and Sexual Activity   • Alcohol use: No   • Drug use: No   • Sexual  activity: Defer           Objective   Physical Exam   Constitutional: She appears well-developed and well-nourished.   Eyes: EOM are normal. Pupils are equal, round, and reactive to light.   Neck: Normal range of motion. Neck supple. No JVD present. No tracheal deviation present.   Cardiovascular: Normal rate, regular rhythm and normal heart sounds.   Pulmonary/Chest: Effort normal and breath sounds normal. No respiratory distress. She has no wheezes. She exhibits no tenderness.   Abdominal: Soft. Bowel sounds are normal. She exhibits no distension. There is tenderness (Vague lower abdominal discomfort). There is no guarding.   Musculoskeletal: She exhibits edema (1+). She exhibits no deformity.   Neurological: She is alert. She is disoriented. A sensory deficit is present. No cranial nerve deficit. Coordination normal. GCS eye subscore is 4. GCS verbal subscore is 5. GCS motor subscore is 6.   Slight slurring of speech.  Muscle strength is 4/5 and symmetric in the upper extremities.  Muscle strength is 3/5 and symmetric in the lower extremities.  Disoriented to date of birth and current date.  Abnormal sensation in RUE compared to the left.  Equal sensation in lower extremities.  Inconsistent sensation exam to face.   Skin: Skin is warm and dry. Capillary refill takes less than 2 seconds.   Psychiatric: She has a normal mood and affect. Her behavior is normal.   Nursing note and vitals reviewed.      Procedures           ED Course  ED Course as of Apr 11 1415   Thu Apr 11, 2019   1145 D-dimer, Quant: (!) 0.75 [MW]   1145 Creatine Kinase: (!) 1,295 [MW]   1145 WBC: 9.34 [MW]   1145 Hemoglobin: (!) 10.6 [MW]   1145 Hematocrit: (!) 32.2 [MW]   1145 Glucose: (!) 107 [MW]   1145 BUN: (!) 45 [MW]   1145 Creatinine: (!) 2.26 [MW]   1145 pH, Arterial: (!) 7.258 [MW]   1150 Troponin I: (!) 0.131 [MW]      ED Course User Index  [MW] Han Dutta,       EKG at 11:20 AM shows a sinus tachycardia with a rate of 116 bpm.   Intervals within normal limits.  Indeterminate axis.  Poor R wave progression.  No T wave inversion.  No ST elevation or evidence for acute infarction.    Labs reviewed    XR Abdomen KUB   Final Result   1. Suspect constipation. No dilated bowel loops indicate obstruction.   This report was finalized on 04/11/2019 13:40 by Dr. Carlos Peralta MD.      CT Head Without Contrast   Final Result   1. No hemorrhage, edema or mass effect.   2. Mild atrophy with associated ventricular prominence.   3. Low density in the white matter is nonspecific and likely due to   chronic small vessel. Vascular calcification is noted.       The full report of this exam was immediately signed and available to the   emergency room. The patient is currently in the emergency room.       This report was finalized on 04/11/2019 12:23 by Dr. Rickey Win MD.      XR Chest 1 View   Final Result   No acute cardiopulmonary process.   This report was finalized on 04/11/2019 11:59 by Dr Partha Whitley, .        Sepsis fluid bolus and IV antibiotics ordered    They were updated on all results and plan of care for admission    I spoke with Dr. Schroeder who accepts admission for Dr. Hay  No further orders            MDM  Number of Diagnoses or Management Options  Acute cystitis with hematuria: new and requires workup  Acute kidney injury (CMS/HCC): new and requires workup  Confusion and disorientation: new and requires workup  Non-traumatic rhabdomyolysis: new and requires workup  Sepsis, due to unspecified organism (CMS/HCC): new and requires workup     Amount and/or Complexity of Data Reviewed  Clinical lab tests: reviewed and ordered  Tests in the radiology section of CPT®: reviewed and ordered  Tests in the medicine section of CPT®: ordered and reviewed  Decide to obtain previous medical records or to obtain history from someone other than the patient: yes  Obtain history from someone other than the patient: yes  Review and summarize past medical  records: yes  Discuss the patient with other providers: yes  Independent visualization of images, tracings, or specimens: yes    Risk of Complications, Morbidity, and/or Mortality  Presenting problems: high  Diagnostic procedures: high  Management options: high    Critical Care  Total time providing critical care: 30-74 minutes    Patient Progress  Patient progress: stable    Not a tPA candidate  No stroke alert called    Patient has multiple lab abnormalities  She does have an elevated troponin level so aspirin has been ordered  She is chest pain-free without evidence for acute infarction  Elevated troponin is more likely related to her metabolic derangement and renal insufficiency    Patient does have a possible UTI  No other obvious infectious source seen at this time  Normal lactate    No abdominal pain on repeat exam  Will hold on further CTs    I spoke with Dr. Schroeder who accepts admission for Dr. Hay  No further orders  Her blood pressure has been stable  Stable for floor at this time      Final diagnoses:   Non-traumatic rhabdomyolysis   Acute kidney injury (CMS/HCC)   Acute cystitis with hematuria   Sepsis, due to unspecified organism (CMS/HCC)   Confusion and disorientation               Han Dutta, DO  04/11/19 4917

## 2019-04-12 PROBLEM — K59.09 CHRONIC CONSTIPATION: Chronic | Status: ACTIVE | Noted: 2019-04-12

## 2019-04-12 PROBLEM — D64.9 NORMOCYTIC ANEMIA: Chronic | Status: ACTIVE | Noted: 2019-04-12

## 2019-04-12 PROBLEM — E03.9 HYPOTHYROIDISM (ACQUIRED): Chronic | Status: ACTIVE | Noted: 2019-04-12

## 2019-04-12 PROBLEM — I10 ESSENTIAL HYPERTENSION: Chronic | Status: ACTIVE | Noted: 2019-04-12

## 2019-04-12 PROBLEM — G89.4 CHRONIC PAIN SYNDROME: Chronic | Status: ACTIVE | Noted: 2019-04-12

## 2019-04-12 PROBLEM — Z79.891 CHRONIC PRESCRIPTION OPIATE USE: Chronic | Status: ACTIVE | Noted: 2019-04-12

## 2019-04-12 PROBLEM — G93.41 METABOLIC ENCEPHALOPATHY: Status: ACTIVE | Noted: 2019-04-11

## 2019-04-12 LAB
ALBUMIN SERPL-MCNC: 2.8 G/DL (ref 3.5–5)
ALBUMIN/GLOB SERPL: 1.2 G/DL (ref 1.1–2.5)
ALP SERPL-CCNC: 74 U/L (ref 24–120)
ALT SERPL W P-5'-P-CCNC: <15 U/L (ref 0–54)
ANION GAP SERPL CALCULATED.3IONS-SCNC: 7 MMOL/L (ref 4–13)
ARTICHOKE IGE QN: 86 MG/DL (ref 0–99)
AST SERPL-CCNC: 44 U/L (ref 7–45)
BILIRUB SERPL-MCNC: 0.5 MG/DL (ref 0.1–1)
BUN BLD-MCNC: 32 MG/DL (ref 5–21)
BUN/CREAT SERPL: 26.4 (ref 7–25)
CALCIUM SPEC-SCNC: 8.6 MG/DL (ref 8.4–10.4)
CHLORIDE SERPL-SCNC: 109 MMOL/L (ref 98–110)
CHOLEST SERPL-MCNC: 148 MG/DL (ref 130–200)
CK SERPL-CCNC: 773 U/L (ref 0–203)
CO2 SERPL-SCNC: 22 MMOL/L (ref 24–31)
CREAT BLD-MCNC: 1.21 MG/DL (ref 0.5–1.4)
DEPRECATED RDW RBC AUTO: 42.5 FL (ref 40–54)
ERYTHROCYTE [DISTWIDTH] IN BLOOD BY AUTOMATED COUNT: 13.5 % (ref 12–15)
GFR SERPL CREATININE-BSD FRML MDRD: 43 ML/MIN/1.73
GLOBULIN UR ELPH-MCNC: 2.4 GM/DL
GLUCOSE BLD-MCNC: 101 MG/DL (ref 70–100)
HBA1C MFR BLD: 4.8 %
HCT VFR BLD AUTO: 26.7 % (ref 37–47)
HDLC SERPL-MCNC: 39 MG/DL
HGB BLD-MCNC: 9 G/DL (ref 12–16)
LDLC/HDLC SERPL: 2.23 {RATIO}
MCH RBC QN AUTO: 29.5 PG (ref 28–32)
MCHC RBC AUTO-ENTMCNC: 33.7 G/DL (ref 33–36)
MCV RBC AUTO: 87.5 FL (ref 82–98)
MYOGLOBIN SERPL-MCNC: 474.1 NG/ML (ref 0–110)
PLATELET # BLD AUTO: 141 10*3/MM3 (ref 130–400)
PMV BLD AUTO: 10.7 FL (ref 6–12)
POTASSIUM BLD-SCNC: 4.2 MMOL/L (ref 3.5–5.3)
PROT SERPL-MCNC: 5.2 G/DL (ref 6.3–8.7)
RBC # BLD AUTO: 3.05 10*6/MM3 (ref 4.2–5.4)
SODIUM BLD-SCNC: 138 MMOL/L (ref 135–145)
TRIGL SERPL-MCNC: 111 MG/DL (ref 0–149)
TROPONIN I SERPL-MCNC: 0.14 NG/ML (ref 0–0.03)
TSH SERPL DL<=0.05 MIU/L-ACNC: 0.6 MIU/ML (ref 0.47–4.68)
WBC NRBC COR # BLD: 5.53 10*3/MM3 (ref 4.8–10.8)

## 2019-04-12 PROCEDURE — 99222 1ST HOSP IP/OBS MODERATE 55: CPT | Performed by: PSYCHIATRY & NEUROLOGY

## 2019-04-12 PROCEDURE — 85027 COMPLETE CBC AUTOMATED: CPT | Performed by: NURSE PRACTITIONER

## 2019-04-12 PROCEDURE — 80053 COMPREHEN METABOLIC PANEL: CPT | Performed by: NURSE PRACTITIONER

## 2019-04-12 PROCEDURE — 97116 GAIT TRAINING THERAPY: CPT

## 2019-04-12 PROCEDURE — 84443 ASSAY THYROID STIM HORMONE: CPT | Performed by: NURSE PRACTITIONER

## 2019-04-12 PROCEDURE — 82550 ASSAY OF CK (CPK): CPT | Performed by: NURSE PRACTITIONER

## 2019-04-12 PROCEDURE — 83036 HEMOGLOBIN GLYCOSYLATED A1C: CPT | Performed by: NURSE PRACTITIONER

## 2019-04-12 PROCEDURE — 25010000002 CEFTRIAXONE PER 250 MG: Performed by: NURSE PRACTITIONER

## 2019-04-12 PROCEDURE — 99221 1ST HOSP IP/OBS SF/LOW 40: CPT | Performed by: NURSE PRACTITIONER

## 2019-04-12 PROCEDURE — 83874 ASSAY OF MYOGLOBIN: CPT | Performed by: NURSE PRACTITIONER

## 2019-04-12 PROCEDURE — 97165 OT EVAL LOW COMPLEX 30 MIN: CPT

## 2019-04-12 PROCEDURE — 80061 LIPID PANEL: CPT | Performed by: NURSE PRACTITIONER

## 2019-04-12 PROCEDURE — 94799 UNLISTED PULMONARY SVC/PX: CPT

## 2019-04-12 PROCEDURE — 84484 ASSAY OF TROPONIN QUANT: CPT | Performed by: NURSE PRACTITIONER

## 2019-04-12 PROCEDURE — 94760 N-INVAS EAR/PLS OXIMETRY 1: CPT

## 2019-04-12 PROCEDURE — 97530 THERAPEUTIC ACTIVITIES: CPT

## 2019-04-12 PROCEDURE — 97162 PT EVAL MOD COMPLEX 30 MIN: CPT

## 2019-04-12 RX ORDER — DOCUSATE SODIUM 100 MG/1
100 CAPSULE, LIQUID FILLED ORAL 2 TIMES DAILY
Status: DISCONTINUED | OUTPATIENT
Start: 2019-04-12 | End: 2019-04-13 | Stop reason: HOSPADM

## 2019-04-12 RX ADMIN — DOCUSATE SODIUM 100 MG: 100 CAPSULE ORAL at 20:16

## 2019-04-12 RX ADMIN — LOSARTAN POTASSIUM 50 MG: 50 TABLET, FILM COATED ORAL at 10:48

## 2019-04-12 RX ADMIN — CARBIDOPA AND LEVODOPA 1 TABLET: 25; 100 TABLET ORAL at 10:47

## 2019-04-12 RX ADMIN — CARBIDOPA AND LEVODOPA 1 TABLET: 25; 100 TABLET ORAL at 20:16

## 2019-04-12 RX ADMIN — CARBIDOPA AND LEVODOPA 1 TABLET: 25; 100 TABLET ORAL at 13:10

## 2019-04-12 RX ADMIN — OXYCODONE HYDROCHLORIDE 15 MG: 5 TABLET ORAL at 21:16

## 2019-04-12 RX ADMIN — CEFTRIAXONE SODIUM 1 G: 1 INJECTION, POWDER, FOR SOLUTION INTRAMUSCULAR; INTRAVENOUS at 15:10

## 2019-04-12 RX ADMIN — SODIUM CHLORIDE 75 ML/HR: 9 INJECTION, SOLUTION INTRAVENOUS at 10:50

## 2019-04-12 RX ADMIN — SODIUM CHLORIDE 125 ML/HR: 9 INJECTION, SOLUTION INTRAVENOUS at 01:23

## 2019-04-12 RX ADMIN — DOCUSATE SODIUM 100 MG: 100 CAPSULE ORAL at 13:11

## 2019-04-12 RX ADMIN — SODIUM CHLORIDE 75 ML/HR: 9 INJECTION, SOLUTION INTRAVENOUS at 23:25

## 2019-04-12 RX ADMIN — OXYCODONE HYDROCHLORIDE 15 MG: 5 TABLET ORAL at 13:16

## 2019-04-12 RX ADMIN — DESMOPRESSIN ACETATE 40 MG: 0.2 TABLET ORAL at 10:47

## 2019-04-12 RX ADMIN — SODIUM CHLORIDE, PRESERVATIVE FREE 3 ML: 5 INJECTION INTRAVENOUS at 10:49

## 2019-04-12 RX ADMIN — CARVEDILOL 12.5 MG: 6.25 TABLET, FILM COATED ORAL at 10:47

## 2019-04-12 RX ADMIN — ACETAMINOPHEN 650 MG: 325 TABLET, FILM COATED ORAL at 02:22

## 2019-04-12 RX ADMIN — POLYETHYLENE GLYCOL 3350 17 G: 17 POWDER, FOR SOLUTION ORAL at 13:12

## 2019-04-12 RX ADMIN — CARBIDOPA AND LEVODOPA 1 TABLET: 25; 100 TABLET ORAL at 19:10

## 2019-04-12 RX ADMIN — OXYCODONE HYDROCHLORIDE 15 MG: 5 TABLET ORAL at 06:06

## 2019-04-12 RX ADMIN — PANTOPRAZOLE SODIUM 40 MG: 40 TABLET, DELAYED RELEASE ORAL at 06:07

## 2019-04-12 RX ADMIN — DILTIAZEM HYDROCHLORIDE 240 MG: 240 CAPSULE, EXTENDED RELEASE ORAL at 10:47

## 2019-04-12 RX ADMIN — CARVEDILOL 12.5 MG: 6.25 TABLET, FILM COATED ORAL at 19:11

## 2019-04-12 RX ADMIN — LEVOTHYROXINE SODIUM 50 MCG: 50 TABLET ORAL at 06:48

## 2019-04-12 NOTE — PROGRESS NOTES
Discharge Planning Assessment  [facility]     Patient Name: Jennifer Gomes  MRN: [id number]  Today's Date: 4/12/2019    Admit Date: 4/11/2019    Discharge Needs Assessment     Row Name 04/12/19 1211       Living Environment    Lives With  spouse;child(sebastian), adult    Name(s) of Who Lives With Patient  [place]    Current Living Arrangements  home/apartment/condo    Primary Care Provided by  self;spouse/significant other;child(sebastian)    Provides Primary Care For  no one, unable/limited ability to care for self    Family Caregiver if Needed  spouse;child(sebastian), adult    Quality of Family Relationships  supportive;involved;helpful    Able to Return to Prior Arrangements  yes       Resource/Environmental Concerns    Resource/Environmental Concerns  none    Transportation Concerns  car, none       Transition Planning    Patient/Family Anticipates Transition to  home with family    Transportation Anticipated  family or friend will provide       Discharge Needs Assessment    Readmission Within the Last 30 Days  no previous admission in last 30 days    Concerns to be Addressed  denies needs/concerns at this time    Equipment Currently Used at Home  cane, straight;walker, rolling;shower chair    Anticipated Changes Related to Illness  none    Equipment Needed After Discharge  none    Current Discharge Risk  chronically ill        Discharge Plan     Row Name 04/12/19 1213       Plan    Plan  Home    Patient/Family in Agreement with Plan  yes    Plan Comments  Spoke with pt/spouse in room to assess for home needs. Pt has spouse/dtr living with her at home.  Pt does not have HH care, not felt needed at present. Pt has needed DME in home. Pt has RX coverage. Family helps with errands etc. as pt does not drive. Will follow.         Destination      No service coordination in this encounter.      Durable Medical Equipment      No service coordination in this encounter.      Dialysis/Infusion      No service coordination in this  encounter.      Home Medical Care      No service coordination in this encounter.      Therapy      No service coordination in this encounter.      Community Resources      No service coordination in this encounter.          Demographic Summary    No documentation.       Functional Status    No documentation.       Psychosocial    No documentation.       Abuse/Neglect    No documentation.       Legal    No documentation.       Substance Abuse    No documentation.       Patient Forms    No documentation.           OSKAR Aradna

## 2019-04-12 NOTE — CONSULTS
Neurology Consult Note    Referring Provider: Dr. Moris Hay  Reason for Consultation: altered mentation      History of present illness:      This is a 78-year-old female with a known history of uterine cancer, chronic back pain, arthritis, and known lumbar stenoses.  She was evaluated by Dr. Raoul Lucio in neurosurgery in August 2017.  She was found to have L4-L5 central canal stenosis which was very significant at the time.  His initial recommendations was for surgical fixation.  Subsequent films and a subsequent visit with the family and the patient they elected not to proceed with surgery.  Per the patient's description it was because Dr. Lucio concluded that the surgery would likely not offer significant pain relief.  The patient has been on significant amount of pain medications.  She took oxycodone 15 mg tablets up to 6 times per day.  Recently she has been taken over by Dr. maher in the pain management group.  He has decreased her oxycodone down to 2 times per day.  She is also on Sinemet therapy.  She has no history of Parkinson's disease.  She may have a component of restless leg syndrome.  She tells me that the indication for starting Sinemet was by her primary care physician.  At the time she was having radicular pain shooting down both legs from her back.  Her primary care physician was attempting to wean off the oxycodone.  He started Sinemet at that time to help with the pain.  She believes that it helped with the pain more than any other medication she takes.    She has no history of seizure disorder.  She has no history of head trauma.  The main indications that I am seeing her currently is that her family tells me that she has spells where she has decreased levels of consciousness.  They give the example that recently she was in a car.  They asked her to get out of the car.  She refused to go the car and sat in the car nonresponding to her family.  No seizure activity was seen.  She had no tongue  biting and no incontinence.  The patient recalls the event greatly.  She tells me that she is often daydreaming during these events and just does not want to answer her family.  She recalls the event in great detail.  She tells me these events are quite common and she believes some of them are secondary to overuse of her medications.  She admits to this.  The duration of these can be for several minutes to several hours.      Past Medical History:   Diagnosis Date   • Anxiety    • Arthritis    • Cancer (CMS/HCC)     uterine   • Chronic pain    • Depression    • Disease of thyroid gland    • Fibromyalgia    • Headache    • Hyperlipidemia    • Hypertension    • Incontinence    • Insomnia    • Leg pain    • Lumbar stenosis    • Peptic ulcer    • Restless legs    • Vaginal bleeding        Allergies   Allergen Reactions   • Ropinirole Hcl Shortness Of Breath   • Codeine Itching and Mental Status Change   • Ambien [Zolpidem] Other (See Comments)     HYPER    • Eszopiclone Other (See Comments)     MAKES PT HYPER    • Pregabalin Dizziness   • Tizanidine Other (See Comments)     Terrible nightmares     No current facility-administered medications on file prior to encounter.      Current Outpatient Medications on File Prior to Encounter   Medication Sig   • atorvastatin (LIPITOR) 40 MG tablet Take 40 mg by mouth Daily.   • carbidopa-levodopa (SINEMET)  MG per tablet Take 1 tablet by mouth 4 (Four) Times a Day.   • carvedilol (COREG) 12.5 MG tablet Take 12.5 mg by mouth 2 (Two) Times a Day With Meals.   • celecoxib (CeleBREX) 100 MG capsule Take 100 mg by mouth Take As Directed. Take 1 capsule 3 to 4 times weekly as needed for inflammation.   • cyclobenzaprine (FLEXERIL) 10 MG tablet Take 10 mg by mouth 2 (Two) Times a Day As Needed for Muscle Spasms.   • diltiazem XR (DILACOR XR) 240 MG 24 hr capsule Take 240 mg by mouth Every Night.   • irbesartan (AVAPRO) 150 MG tablet Take 150 mg by mouth Daily.   • lansoprazole  (PREVACID) 30 MG capsule Take 30 mg by mouth Daily.   • levothyroxine (SYNTHROID, LEVOTHROID) 50 MCG tablet Take 50 mcg by mouth Every Morning.   • oxyCODONE HCl 15 MG tablet  Take 15 mg by mouth 4 (Four) Times a Day As Needed for Moderate Pain .   • SUMAtriptan (IMITREX) 50 MG tablet Take 50 mg by mouth 2 (Two) Times a Day As Needed for Migraine.       Social History     Socioeconomic History   • Marital status:      Spouse name: Not on file   • Number of children: Not on file   • Years of education: Not on file   • Highest education level: Not on file   Tobacco Use   • Smoking status: Never Smoker   • Smokeless tobacco: Never Used   Substance and Sexual Activity   • Alcohol use: No   • Drug use: No   • Sexual activity: Defer     Family History   Problem Relation Age of Onset   • Diabetes Mother    • Multiple myeloma Mother    • Diabetes Sister    • Ovarian cancer Paternal Aunt    • Prostate cancer Brother    • Lymphoma Brother         NHL   • Cancer Paternal Grandmother         metastatic   • Lung cancer Paternal Grandfather    • Colon cancer Neg Hx    • Esophageal cancer Neg Hx    • Breast cancer Neg Hx        Review of Systems  A 14 point review of systems was reviewed and was negative except for alteration of consciousness    Vital Signs   Temp:  [97.8 °F (36.6 °C)-98.5 °F (36.9 °C)] 97.9 °F (36.6 °C)  Heart Rate:  [] 88  Resp:  [16-20] 20  BP: (109-155)/(47-80) 144/59    General Exam:  Head:  Normal cephalic, atraumatic  HEENT:  Neck supple  Fundoscopic Exam:  No signs of disc edema  CVS:  Regular rate and rhythm.  No murmurs  Carotid Examination:  No bruits  Lungs:  Clear to auscultation  Abdomen:  Non-tender, Non-distended  Extremities:  No signs of peripheral edema  Skin:  No rashes    Neurologic Exam:    Mental Status:    -Awake, Alert, Oriented X 3  -No word finding difficulties  -No aphasia  -No dysarthria  -Follows simple and complex commands    CN II:  Visual fields full.  Pupils equally  reactive to light  CN III, IV, VI:  Extraocular Muscles full with no signs of nystagmus  CN V:  Facial sensory is symmetric with no asymmetries.  CN VII:  Facial motor symmetric  CN VIII:  Gross hearing intact bilaterally  CN IX:  Palate elevates symmetrically  CN X:  Palate elevates symmetrically  CN XI:  Shoulder shrug symmetric  CN XII:  Tongue is midline on protrusion    Motor:     Upper extremities are full strength both proximally and distally  Lower extremities have full strength on hip flexion and extension bilaterally.  Knee flexion and extension are full strength as well.  Dorsiflexion on the left foot is 4-5 and on the right is 5-.  Plantar flexion is full strength in both extremities    DTR:  Reflexes are 1+ in the upper extremities and somewhat blunted in the knees and ankles.    Sensory:  -Intact to light touch, pinprick, temperature, pain, and proprioception    Coordination:  -Finger to nose intact  -Heel to shin intact  -No ataxia    Gait  Requires a rolling walker      Results Review:  Lab Results (last 24 hours)     Procedure Component Value Units Date/Time    Urine Culture - Urine, Urine, Catheter In/Out [[id number]]  (Normal) Collected:  04/11/19 1058    Specimen:  Urine, Catheter In/Out Updated:  04/12/19 0740     Urine Culture No growth at 24 hours    Hemoglobin A1c [[id number]] Collected:  04/12/19 0532    Specimen:  Blood Updated:  04/12/19 0641     Hemoglobin A1C 4.8 %     Narrative:       Less than 6.0           Non-Diabetic Range  6.0-7.0                 ADA Therapeutic Target  Greater than 7.0        Action Suggested    TSH [[id number]]  (Normal) Collected:  04/12/19 0532    Specimen:  Blood Updated:  04/12/19 0625     TSH 0.600 mIU/mL     Myoglobin, Serum [[id number]]  (Abnormal) Collected:  04/12/19 0532    Specimen:  Blood Updated:  04/12/19 0610     Myoglobin 474.1 ng/mL     Troponin [[id number]]  (Abnormal) Collected:  04/12/19 0532    Specimen:  Blood Updated:  04/12/19 0606     Troponin  I 0.141 ng/mL     Lipid Panel [[id number]]  (Abnormal) Collected:  04/12/19 0532    Specimen:  Blood Updated:  04/12/19 0605     Total Cholesterol 148 mg/dL      Triglycerides 111 mg/dL      HDL Cholesterol 39 mg/dL      LDL Cholesterol  86 mg/dL      LDL/HDL Ratio 2.23    CK [[id number]]  (Abnormal) Collected:  04/12/19 0532    Specimen:  Blood Updated:  04/12/19 0555     Creatine Kinase 773 U/L     Comprehensive Metabolic Panel [[id number]]  (Abnormal) Collected:  04/12/19 0532    Specimen:  Blood Updated:  04/12/19 0555     Glucose 101 mg/dL      BUN 32 mg/dL      Creatinine 1.21 mg/dL      Sodium 138 mmol/L      Potassium 4.2 mmol/L      Chloride 109 mmol/L      CO2 22.0 mmol/L      Calcium 8.6 mg/dL      Total Protein 5.2 g/dL      Albumin 2.80 g/dL      ALT (SGPT) <15 U/L      AST (SGOT) 44 U/L      Alkaline Phosphatase 74 U/L      Total Bilirubin 0.5 mg/dL      eGFR Non African Amer 43 mL/min/1.73      Globulin 2.4 gm/dL      A/G Ratio 1.2 g/dL      BUN/Creatinine Ratio 26.4     Anion Gap 7.0 mmol/L     Narrative:       GFR Normal >60  Chronic Kidney Disease <60  Kidney Failure <15    CBC (No Diff) [[id number]]  (Abnormal) Collected:  04/12/19 0532    Specimen:  Blood Updated:  04/12/19 0544     WBC 5.53 10*3/mm3      RBC 3.05 10*6/mm3      Hemoglobin 9.0 g/dL      Hematocrit 26.7 %      MCV 87.5 fL      MCH 29.5 pg      MCHC 33.7 g/dL      RDW 13.5 %      RDW-SD 42.5 fl      MPV 10.7 fL      Platelets 141 10*3/mm3     Blood Culture - Blood, Arm, Right [[id number]] Collected:  04/11/19 1057    Specimen:  Blood from Arm, Right Updated:  04/11/19 2330     Blood Culture No growth at less than 24 hours    Blood Culture - Blood, Arm, Left [[id number]] Collected:  04/11/19 1110    Specimen:  Blood from Arm, Left Updated:  04/11/19 2330     Blood Culture No growth at less than 24 hours    Procalcitonin [[id number]]  (Abnormal) Collected:  04/11/19 1110    Specimen:  Blood Updated:  04/11/19 1229     Procalcitonin 0.45  ng/mL     Narrative:       SIRS, sepsis, severe sepsis, and septic shock are categorized according to the criteria of the consensus conference of the American College of Chest Physicians/Society of Critical Care Medicine.    PCT < 0.5 ng/mL     Systemic infection (sepsis) is not likely.    PCT >0.5 and < 2.0 ng/mL Systemic infection (sepsis) is possible, but other conditions are known to elevate PCT as well.    PCT > 2.0 ng/mL     Systemic infection (sepsis) is likely, unless other causes are known.      PCT > 10.0 ng/mL    Important systemic inflammatory response, almost exclusively due to severe bacterial sepsis or septic shock.    PCT values of < 0.5 ng/mL do not exclude an infection, because localized infections (without systemic signs) may be associated with such low concentrations, or a systemic infection in its initial stages (<6 hours).  Increased PCT can occur without infection.  PCT concentrations between 0.5 and 2.0 ng/mL should be interpreted taking into account the patients history.  It is recommended to retest PCT within 6-24 hours if any concentrations < 2.0 ng/mL are obtained.    Lactic Acid, Plasma [[id number]]  (Normal) Collected:  04/11/19 1110    Specimen:  Blood Updated:  04/11/19 1217     Lactate 0.9 mmol/L     Myoglobin, Serum [[id number]]  (Abnormal) Collected:  04/11/19 1110    Specimen:  Blood Updated:  04/11/19 1212     Myoglobin 3,402.0 ng/mL     TSH [[id number]]  (Normal) Collected:  04/11/19 1110    Specimen:  Blood Updated:  04/11/19 1208     TSH 0.936 mIU/mL     Urine Drug Screen - Urine, Catheter [[id number]]  (Abnormal) Collected:  04/11/19 1058    Specimen:  Urine, Catheter Updated:  04/11/19 1207     Amphetamine Screen, Urine Negative     Barbiturates Screen, Urine Negative     Benzodiazepine Screen, Urine Positive     Cocaine Screen, Urine Negative     Methadone Screen, Urine Negative     Opiate Screen Positive     Phencyclidine (PCP), Urine Negative     THC, Screen, Urine Negative     Narrative:       Negative Thresholds For Drugs Screened in Urine:    Amphetamines          500 ng/ml  Barbiturates          200 ng/ml  Benzodiazepines       200 ng/ml  Cocaine               150 ng/ml  Methadone             150 ng/ml  Opiates               300 ng/ml  Phencyclidine         25 ng/ml  THC                      50 ng/ml    The normal value for all drugs tested is negative. This report includes final unconfirmed screening results.  A positive result by this assay can be, at your request, sent to the Reference Lab for confirmation by gas chromatography. Unconfirmed results must not be used for non-medical purposes, such as employment or legal testing. Clinical consideration should be applied to any drug of abuse test result, particularly when unconfirmed results are used.    T4, Free [[id number]]  (Normal) Collected:  04/11/19 1110    Specimen:  Blood Updated:  04/11/19 1154     Free T4 1.24 ng/dL     Urinalysis With Culture If Indicated - Urine, Catheter In/Out [[id number]]  (Abnormal) Collected:  04/11/19 1058    Specimen:  Urine, Catheter In/Out Updated:  04/11/19 1154     Color, UA Dark Yellow     Appearance, UA Clear     pH, UA <=5.0     Specific Gravity, UA 1.021     Glucose, UA Negative     Ketones, UA Negative     Bilirubin, UA Negative     Blood, UA Moderate (2+)     Protein, UA Trace     Leuk Esterase, UA Small (1+)     Nitrite, UA Negative     Urobilinogen, UA 0.2 E.U./dL    Urinalysis, Microscopic Only - Urine, Catheter In/Out [[id number]]  (Abnormal) Collected:  04/11/19 1058    Specimen:  Urine, Catheter In/Out Updated:  04/11/19 1154     RBC, UA 0-2 /HPF      WBC, UA 6-12 /HPF      Bacteria, UA None Seen /HPF      Squamous Epithelial Cells, UA 3-6 /HPF      Yeast, UA       Small/1+ Budding Yeast w/Hyphae     /HPF     Hyaline Casts, UA 0-2 /LPF      Fine Granular Casts, UA 0-2 /LPF      Methodology Manual Light Microscopy    BNP [[id number]]  (Normal) Collected:  04/11/19 1110    Specimen:   Blood Updated:  04/11/19 1148     proBNP 1,660.0 pg/mL     Troponin [[id number]]  (Abnormal) Collected:  04/11/19 1110    Specimen:  Blood Updated:  04/11/19 1148     Troponin I 0.131 ng/mL     Influenza Antigen, Rapid - Swab, Nasopharynx [[id number]]  (Normal) Collected:  04/11/19 1107    Specimen:  Swab from Nasopharynx Updated:  04/11/19 1141     Influenza A Ag, EIA Negative     Influenza B Ag, EIA Negative    Narrative:       Recommend confirmation of negative results by viral culture or molecular assay.    Ammonia [[id number]]  (Abnormal) Collected:  04/11/19 1110    Specimen:  Blood Updated:  04/11/19 1137     Ammonia <9 umol/L     Lipase [[id number]]  (Normal) Collected:  04/11/19 1110    Specimen:  Blood Updated:  04/11/19 1136     Lipase 33 U/L     Comprehensive Metabolic Panel [[id number]]  (Abnormal) Collected:  04/11/19 1110    Specimen:  Blood Updated:  04/11/19 1136     Glucose 107 mg/dL      BUN 45 mg/dL      Creatinine 2.26 mg/dL      Sodium 136 mmol/L      Potassium 4.5 mmol/L      Chloride 101 mmol/L      CO2 24.0 mmol/L      Calcium 9.3 mg/dL      Total Protein 6.7 g/dL      Albumin 4.00 g/dL      ALT (SGPT) 17 U/L      AST (SGOT) 49 U/L      Alkaline Phosphatase 100 U/L      Total Bilirubin 0.7 mg/dL      eGFR Non African Amer 21 mL/min/1.73      Globulin 2.7 gm/dL      A/G Ratio 1.5 g/dL      BUN/Creatinine Ratio 19.9     Anion Gap 11.0 mmol/L     Narrative:       GFR Normal >60  Chronic Kidney Disease <60  Kidney Failure <15    Ethanol [[id number]]  (Normal) Collected:  04/11/19 1110    Specimen:  Blood Updated:  04/11/19 1136     Ethanol % <0.010 %     Narrative:       Not for legal purposes. Chain of Custody not followed.     CK [[id number]]  (Abnormal) Collected:  04/11/19 1110    Specimen:  Blood Updated:  04/11/19 1136     Creatine Kinase 1,295 U/L     Magnesium [[id number]]  (Abnormal) Collected:  04/11/19 1110    Specimen:  Blood Updated:  04/11/19 1136     Magnesium 2.3 mg/dL     D-dimer,  Quantitative [[id number]]  (Abnormal) Collected:  04/11/19 1110    Specimen:  Blood Updated:  04/11/19 1136     D-Dimer, Quantitative 0.75 mg/L (FEU)     Narrative:       Reference Range is 0-0.50 mg/L FEU. However, results <0.50 mg/L FEU tends to rule out DVT or PE. Results >0.50 mg/L FEU are not useful in predicting absence or presence of DVT or PE.    CBC & Differential [[id number]] Collected:  04/11/19 1110    Specimen:  Blood Updated:  04/11/19 1126    Narrative:       The following orders were created for panel order CBC & Differential.  Procedure                               Abnormality         Status                     ---------                               -----------         ------                     CBC Auto Differential[[id number]]        Abnormal            Final result                 Please view results for these tests on the individual orders.    CBC Auto Differential [[id number]]  (Abnormal) Collected:  04/11/19 1110    Specimen:  Blood Updated:  04/11/19 1126     WBC 9.34 10*3/mm3      RBC 3.67 10*6/mm3      Hemoglobin 10.6 g/dL      Hematocrit 32.2 %      MCV 87.7 fL      MCH 28.9 pg      MCHC 32.9 g/dL      RDW 13.5 %      RDW-SD 43.0 fl      MPV 10.6 fL      Platelets 178 10*3/mm3      Neutrophil % 75.3 %      Lymphocyte % 12.8 %      Monocyte % 10.0 %      Eosinophil % 0.7 %      Basophil % 0.2 %      Immature Grans % 1.0 %      Neutrophils, Absolute 7.03 10*3/mm3      Lymphocytes, Absolute 1.20 10*3/mm3      Monocytes, Absolute 0.93 10*3/mm3      Eosinophils, Absolute 0.07 10*3/mm3      Basophils, Absolute 0.02 10*3/mm3      Immature Grans, Absolute 0.09 10*3/mm3      nRBC 0.0 /100 WBC     Blood Gas, Arterial [[id number]]  (Abnormal) Collected:  04/11/19 1055    Specimen:  Arterial Blood Updated:  04/11/19 1103     Site Right Radial     Donny's Test Positive     pH, Arterial 7.258 pH units      Comment: 84 Value below reference range        pCO2, Arterial 49.9 mm Hg      Comment: 83 Value above  reference range        pO2, Arterial 66.3 mm Hg      Comment: 84 Value below reference range        HCO3, Arterial 22.3 mmol/L      Base Excess, Arterial -4.9 mmol/L      Comment: 84 Value below reference range        O2 Saturation, Arterial 93.6 %      Comment: 84 Value below reference range        Temperature 37.0 C      Barometric Pressure for Blood Gas 743 mmHg      Modality Nasal Cannula     Flow Rate 2.0 lpm      Ventilator Mode NA     Collected by [id number]     Comment: Meter: [id number]     :  [id number]             .  Imaging Results (last 24 hours)     Procedure Component Value Units Date/Time    MRI Lumbar Spine Without Contrast [[id number]] Collected:  04/11/19 2123     Updated:  04/11/19 2133    Narrative:       MRI LUMBAR SPINE WO CONTRAST- 4/11/2019 5:48 PM CDT     HISTORY: Low back pain, rapidly progressive neuro deficit;  M62.82-Rhabdomyolysis; N17.9-Acute kidney failure, unspecified;  N30.01-Acute cystitis with hematuria; A41.9-Sepsis, unspecified  organism; F99-Mental disorder, not otherwise specified     COMPARISON: None      TECHNIQUE: Multiplanar, multisequence MRI of the lumbar spine was  performed without the use of contrast.     FINDINGS:      Alignment: There are presumed to be 5 lumbar-type vertebrae with the  most inferior being labeled L5. There is a normal lumbar lordosis. Grade  1 anterolisthesis of L4 on L5 is present felt to represent subluxation  at the level of the facets. Degenerative disc disease is noted  throughout the lumbar spine with relative sparing of the L3-L4 level.  There is narrowing of disc space height and loss of hydration on the  more heavily T2-weighted images..     Marrow signal: No pathologic marrow infiltrate is demonstrated. The  vertebral body heights and posterior elements are maintained.      Cord/Canal: The conus medullaris terminates at the level of L1. The  visualized spinal cord is normal in signal and morphology.      Soft tissues: The  surrounding soft tissues are unremarkable.      Levels:      L1-L2: There is disc desiccation with mild bulging of the disc. Moderate  facet and mild ligamentous hypertrophy is present. There is no evidence  of central spinal stenosis. No neural foraminal narrowing is present..      L2-L3: There is disc desiccation with mild bulging of the disc. Moderate  facet and ligamentous hypertrophy is present. Broad-based foraminal and  lateral disc protrusions are present bilaterally. There is no evidence  of central spinal stenosis. The lateral disc protrusions combined with  the facet hypertrophy contribute to mild bilateral neural foraminal  narrowing..      L3-L4: There is mild bulging of the disc as well as moderate facet and  ligamentous hypertrophy. Lateral and foraminal protrusion of disc  material results in mild bilateral neural foraminal narrowing (left  greater than right). There is no central stenosis..      L4-L5: There is severe facet arthropathy at the L4-L5 level as well as  ligamentous hypertrophy. There is broad-based bulging of the disc. The  combination of findings results in severe central spinal stenosis with  marked effacement of the thecal sac. Moderate bilateral neural foraminal  narrowing is also present related to the broad-based bulging of the disc  and ligamentous hypertrophy..      L5-S1: Moderate to severe facet and ligamentous hypertrophy is noted at  this level. There is mild bulging of the disc. No evidence of central  stenosis. The bulging the disc and ligamentous hypertrophy result in  moderate left-sided and mild right-sided foraminal narrowing..        Impression:       1. Degenerative disc disease with bulging of the disc is noted at  multiple levels. This combined with facet and ligamentous hypertrophy  contributes to central and foraminal stenosis as described above.  2. The most significant finding is at the L4-L5 level where there is  severe facet arthropathy and ligamentous  hypertrophy. There is also  broad-based bulging of the disc resulting in central stenosis which is  severe in nature. Moderate bilateral neural foraminal narrowing is also  present. There is grade 1 anterolisthesis of L4 upon L5 secondary to  subluxation at the level of the facets..        This report was finalized on 04/11/2019 21:30 by Dr. Chi Hinds MD.    XR Abdomen KUB [[id number]] Collected:  04/11/19 1339     Updated:  04/11/19 1344    Narrative:       EXAMINATION:  XR ABDOMEN KUB-  4/11/2019 1:10 PM CDT     HISTORY: abdominal pain; M62.82-Rhabdomyolysis; N17.9-Acute kidney  failure, unspecified; N30.01-Acute cystitis with hematuria;  A41.9-Sepsis, unspecified organism; F99-Mental disorder, not otherwise  specified      COMPARISON: No comparison study.     TECHNIQUE: Single view, 2 images: KUB     FINDINGS:      There is a large amount of stool observed throughout the colon without  dilated bowel loops to indicate obstruction. Constipation suspected.     There are multiple calcific densities in the pelvis, likely phleboliths.     There is no organomegaly.     Degenerative changes noted in the thoracolumbar spine with osteophyte  formation.       Impression:       1. Suspect constipation. No dilated bowel loops indicate obstruction.  This report was finalized on 04/11/2019 13:40 by Dr. Carlos Peralta MD.    CT Head Without Contrast [[id number]] Collected:  04/11/19 1215     Updated:  04/11/19 1226    Narrative:       EXAMINATION:  CT HEAD WO CONTRAST-  4/11/2019 12:05 PM CDT     HISTORY: Altered mental status.     TECHNIQUE: Multiple axial images were obtained through the brain without  contrast infusion. Multiplanar images were reconstructed.     DLP: 762 mGy-cm. Automated dosage control was utilized.     COMPARISON: 02/26/2013.     FINDINGS: There are no hemorrhage, edema or mass effect. There is mild  atrophy with associated ventricular prominence. There is low density in  the white matter. Vascular  calcification is noted. The visualized  paranasal sinuses and mastoid air cells are clear. No calvarial fracture  is seen.       Impression:       1. No hemorrhage, edema or mass effect.  2. Mild atrophy with associated ventricular prominence.  3. Low density in the white matter is nonspecific and likely due to  chronic small vessel. Vascular calcification is noted.     The full report of this exam was immediately signed and available to the  emergency room. The patient is currently in the emergency room.     This report was finalized on 04/11/2019 12:23 by Dr. Rickey Win MD.    XR Chest 1 View [[id number]] Collected:  04/11/19 1157     Updated:  04/11/19 1202    Narrative:       History:  78-year-old with confusion and shortness of breath.     Reference:  Chest radiograph October 2017.     Findings:  Frontal chest radiograph performed.     Normal heart size. The lungs are clear. No pleural fluid or  pneumothorax. No acute osseous abnormality. Degenerative changes of the  spine.          Impression:       No acute cardiopulmonary process.  This report was finalized on 04/11/2019 11:59 by Dr Partha Whitley, .          MRI of lumbar spine reviewed by me.  There is significant spinal stenosis at the L4-L5 level.  There is central canal stenosis at this level to the point where CSF is obliterated on both sides.      Impression    1.  L4-L5 lumbar stenoses  2.  History of Sinemet usage for control of neuropathic symptoms  3.  Episodes of alteration of consciousness likely secondary to pain medications or could be purposeful secondary to patient description  4.  History of uterine cancer with chronic pain  5.  Urine drug screen positive for opiates and benzodiazepines even though the patient has no prescription for benzodiazepines    Plan    · Recommend EEG to rule out epileptic causes of alteration of consciousness; however, I believe this would be a low likelihood as a cause of her alteration of consciousness  · No  findings on neurologic exam concerning for a central CNS lesion and therefore MRI brain not recommended at this time  · Recommend decreasing medications as much is possible that her mind altering  · The patient does not wish to pursue surgical options of lumbar stenoses.  Recommend continuing pain management with a focus of decreasing use of mind altering medications as much as possible  · The patient wishes to leave the hospital as soon as possible.  I have agreed to arrange the EEG as an outpatient.  She will follow-up in the neurology clinic in 4-6 weeks.  At that time it may also be reasonable to arrange a neurologic exam in the office date examination with a Sinemet vacation for at least 8 hours prior to examination.  This will help better define the role of Sinemet and what it is benefiting her.    I discussed the patients findings and my recommendations with patient    Salbador Brady MD  04/12/19  10:20 AM

## 2019-04-12 NOTE — PROGRESS NOTES
"    [facility] Medicine Services  INPATIENT PROGRESS NOTE    Patient Name: Jennifer Gomes  Date of Admission: 4/11/2019  Today's Date: 04/12/19  Length of Stay: 1  Primary Care Physician: Zaire Uriarte MD    Subjective   Chief Complaint: weakness  HPI   \"I have got to do my taxes.\"    Her  is not currently present.    She states that she \"hurts all over today.\"    She tells me that she is back to her baseline and she wants to go home soon.  I informed her that she needs to be evaluated by the consultants and physical therapy.  The earliest that she can go home would be tomorrow.    Review of Systems   All pertinent negatives and positives are as above. All other systems have been reviewed and are negative unless otherwise stated.     Objective    Temp:  [97.8 °F (36.6 °C)-98.5 °F (36.9 °C)] 97.9 °F (36.6 °C)  Heart Rate:  [] 88  Resp:  [16-20] 20  BP: (109-155)/(47-80) 144/59  Physical Exam   Constitutional: She is oriented to person, place, and time. She appears well-developed and well-nourished.   Up in bed.  No distress.  No family currently present.  Discussed with her nurse, Karina.    HENT:   Head: Normocephalic and atraumatic.   Eyes: Conjunctivae and EOM are normal. Pupils are equal, round, and reactive to light.   Neck: Neck supple. No JVD present.   Cardiovascular: Normal rate, regular rhythm, normal heart sounds and intact distal pulses. Exam reveals no gallop and no friction rub.   No murmur heard.  Pulmonary/Chest: Effort normal and breath sounds normal. No respiratory distress. She has no wheezes. She has no rales. She exhibits no tenderness.   Abdominal: Soft. Bowel sounds are normal. She exhibits no distension. There is no tenderness. There is no rebound and no guarding.   Musculoskeletal: Normal range of motion. She exhibits edema (trace). She exhibits no tenderness or deformity.   Neurological: She is alert and oriented to person, place, and " time. She displays normal reflexes. No cranial nerve deficit. She exhibits normal muscle tone.   Generally weak, nonfocal.   Skin: Skin is warm and dry. No rash noted.   Psychiatric:   Flat.  Does not seem confused.     Results Review:  I have reviewed the labs, radiology results, and diagnostic studies.    Laboratory Data:   Results from last 7 days   Lab Units 04/12/19  0532 04/11/19  1110   WBC 10*3/mm3 5.53 9.34   HEMOGLOBIN g/dL 9.0* 10.6*   HEMATOCRIT % 26.7* 32.2*   PLATELETS 10*3/mm3 141 178     Results from last 7 days   Lab Units 04/12/19  0532 04/11/19  1110   SODIUM mmol/L 138 136   POTASSIUM mmol/L 4.2 4.5   CHLORIDE mmol/L 109 101   CO2 mmol/L 22.0* 24.0   BUN mg/dL 32* 45*   CREATININE mg/dL 1.21 2.26*   CALCIUM mg/dL 8.6 9.3   BILIRUBIN mg/dL 0.5 0.7   ALK PHOS U/L 74 100   ALT (SGPT) U/L <15 17   AST (SGOT) U/L 44 49*   GLUCOSE mg/dL 101* 107*     Results from last 7 days   Lab Units 04/12/19  0532 04/11/19  1110   CK TOTAL U/L 773* 1,295*     Lab Results   Lab Value Date/Time    TROPONINI 0.141 (H) 04/12/2019 0532    TROPONINI 0.131 (H) 04/11/2019 1110     Culture Data:   Blood Culture   Date Value Ref Range Status   04/11/2019 No growth at less than 24 hours  Preliminary   04/11/2019 No growth at less than 24 hours  Preliminary     Urine Culture   Date Value Ref Range Status   04/11/2019 No growth at 24 hours  Preliminary     Radiology Data:   Imaging Results (last 24 hours)     Procedure Component Value Units Date/Time    MRI Lumbar Spine Without Contrast [[id number]] Collected:  04/11/19 2123     Updated:  04/11/19 2133    Narrative:       MRI LUMBAR SPINE WO CONTRAST- 4/11/2019 5:48 PM CDT     HISTORY: Low back pain, rapidly progressive neuro deficit;  M62.82-Rhabdomyolysis; N17.9-Acute kidney failure, unspecified;  N30.01-Acute cystitis with hematuria; A41.9-Sepsis, unspecified  organism; F99-Mental disorder, not otherwise specified     COMPARISON: None      TECHNIQUE: Multiplanar,  multisequence MRI of the lumbar spine was  performed without the use of contrast.     FINDINGS:      Alignment: There are presumed to be 5 lumbar-type vertebrae with the  most inferior being labeled L5. There is a normal lumbar lordosis. Grade  1 anterolisthesis of L4 on L5 is present felt to represent subluxation  at the level of the facets. Degenerative disc disease is noted  throughout the lumbar spine with relative sparing of the L3-L4 level.  There is narrowing of disc space height and loss of hydration on the  more heavily T2-weighted images..     Marrow signal: No pathologic marrow infiltrate is demonstrated. The  vertebral body heights and posterior elements are maintained.      Cord/Canal: The conus medullaris terminates at the level of L1. The  visualized spinal cord is normal in signal and morphology.      Soft tissues: The surrounding soft tissues are unremarkable.      Levels:      L1-L2: There is disc desiccation with mild bulging of the disc. Moderate  facet and mild ligamentous hypertrophy is present. There is no evidence  of central spinal stenosis. No neural foraminal narrowing is present..      L2-L3: There is disc desiccation with mild bulging of the disc. Moderate  facet and ligamentous hypertrophy is present. Broad-based foraminal and  lateral disc protrusions are present bilaterally. There is no evidence  of central spinal stenosis. The lateral disc protrusions combined with  the facet hypertrophy contribute to mild bilateral neural foraminal  narrowing..      L3-L4: There is mild bulging of the disc as well as moderate facet and  ligamentous hypertrophy. Lateral and foraminal protrusion of disc  material results in mild bilateral neural foraminal narrowing (left  greater than right). There is no central stenosis..      L4-L5: There is severe facet arthropathy at the L4-L5 level as well as  ligamentous hypertrophy. There is broad-based bulging of the disc. The  combination of findings results  in severe central spinal stenosis with  marked effacement of the thecal sac. Moderate bilateral neural foraminal  narrowing is also present related to the broad-based bulging of the disc  and ligamentous hypertrophy..      L5-S1: Moderate to severe facet and ligamentous hypertrophy is noted at  this level. There is mild bulging of the disc. No evidence of central  stenosis. The bulging the disc and ligamentous hypertrophy result in  moderate left-sided and mild right-sided foraminal narrowing..        Impression:       1. Degenerative disc disease with bulging of the disc is noted at  multiple levels. This combined with facet and ligamentous hypertrophy  contributes to central and foraminal stenosis as described above.  2. The most significant finding is at the L4-L5 level where there is  severe facet arthropathy and ligamentous hypertrophy. There is also  broad-based bulging of the disc resulting in central stenosis which is  severe in nature. Moderate bilateral neural foraminal narrowing is also  present. There is grade 1 anterolisthesis of L4 upon L5 secondary to  subluxation at the level of the facets..        This report was finalized on 04/11/2019 21:30 by Dr. Chi Hinds MD.     I have reviewed the patient's current medications.     Assessment/Plan     Active Hospital Problems    Diagnosis   • **Non-traumatic rhabdomyolysis   • Metabolic encephalopathy   • Acute renal failure superimposed on stage 3 chronic kidney disease (CMS/HCC)   • Acute respiratory failure with hypoxia and hypercapnia (CMS/HCC)   • SIRS (systemic inflammatory response syndrome) (CMS/HCC)   • Spinal stenosis, lumbar region, without neurogenic claudication   • Chronic constipation   • Chronic pain syndrome   • Chronic prescription opiate use   • Normocytic anemia   • Medical non-compliance, does not take narcotics as prescribed   • Hypothyroidism (acquired)   • Essential hypertension     Plan:   The patient was originally admitted on  "4/11 after presenting to the emergency department with nontraumatic rhabdomyolysis secondary to inactivity and not moving from the same position for quite some time.  No falls were described.    CPK improving.  Troponin trend flat.  Stop checking.  Renal insufficiency has resolved.  Continue IV fluids.    The patient was recently treated for urinary tract infection outpatient.  Her urine could have been sterilized.  However, her urinalysis is not impressive at this point in time.  Urine culture pending.  Continue Rocephin for now.    Neurology consultation to evaluate her \"spells.\"  I'm more concerned that the patient has problems with polypharmacy and does not take her opiate pain medications as prescribed.    Neurosurgery consultation to evaluate her severe lumbar spinal stenosis.    Bowel regimen.    Hemoglobin A1c normal at 4.8.  TSH appropriately suppressed at 0.60.    Reconciled and restarted the bulk of her home medications.    Physical and occupational therapy to evaluate.   consulted for home health versus placement.    SCDs for DVT prophylaxis.    Discharge Planning: I expect the patient to be discharged to home with home health versus SNF in 2-3 days.    Angel Hay,    04/12/19   9:45 AM    "

## 2019-04-12 NOTE — PLAN OF CARE
Problem: Patient Care Overview  Goal: Plan of Care Review  Outcome: Ongoing (interventions implemented as appropriate)   04/12/19 1310   OTHER   Outcome Summary PT eval completed. Pt alert and oriented to self, place and situation, and required verbal cueing for orientation to time. She was on room air on entry, and O2 was measured at 84%; pt given SupO2 x3L for the rest of the eval and O2 stayed in the 92-95% range with activities. She required Viral to ModA x1 for sit<>stand, stand<>sit, Squatpivot toilet t/fs to bedside commode. She ambulated ~15 ft with RW and MinAx1. With gait training pt demo'ed a severe fwd flexed posture and unsafe tendency to reach and grab for balance and pushed the RW too far out in front of herself. Throughout the evaluation pt demonstrated decreased safety awareness and minimal to no learning with cues and education to correct. Pt requested continued gait and t/fs training to return to supine in bed. On second approach pt ambulated ~15 ft with RW and minAx1.She continued to demo's fwd flexed posture but had some improvements with heavy verbal cueing. Pt completed sit<>supine and scooting/bridging in bed with Viral to ModAx1. She would benefit from skilled PT to address impaired balance leading to impairments in gait and decreased activity tolerance. Recommend dc home with HH vs SNF pending medical status.   Coping/Psychosocial   Plan of Care Reviewed With patient

## 2019-04-12 NOTE — THERAPY EVALUATION
Acute Care - Occupational Therapy Initial Evaluation  [facility]     Patient Name: Jennifer Gomes  : 1940  MRN: [id number]  Today's Date: 2019  Onset of Illness/Injury or Date of Surgery: (P) 19  Date of Referral to OT: 19  Referring Physician: MORGAN Cunningham (P)    Admit Date: 2019       ICD-10-CM ICD-9-CM   1. Non-traumatic rhabdomyolysis M62.82 728.88   2. Acute kidney injury (CMS/HCC) N17.9 584.9   3. Acute cystitis with hematuria N30.01 595.0   4. Sepsis, due to unspecified organism (CMS/HCC) A41.9 038.9     995.91   5. Confusion and disorientation F99 300.9   6. Metabolic encephalopathy G93.41 348.31   7. Impaired functional mobility, balance, gait, and endurance Z74.09 V49.89   8. Impaired mobility and ADLs Z74.09 799.89     Patient Active Problem List   Diagnosis   • Gastroesophageal reflux disease   • Spinal stenosis, lumbar region, without neurogenic claudication   • Overweight   • Non-smoker   • Erosion of vaginal mesh (CMS/HCC)   • Adenocarcinoma of endometrium (CMS/HCC)   • Encounter for consultation   • S/P hysterectomy with oophorectomy   • Encounter for follow-up surveillance of endometrial cancer   • History of radiation therapy   • BMI 37.0-37.9, adult   • Non-traumatic rhabdomyolysis   • Metabolic encephalopathy   • Acute renal failure superimposed on stage 3 chronic kidney disease (CMS/HCC)   • Acute respiratory failure with hypoxia and hypercapnia (CMS/HCC)   • Medical non-compliance, does not take narcotics as prescribed   • SIRS (systemic inflammatory response syndrome) (CMS/HCC)   • Chronic constipation   • Chronic pain syndrome   • Chronic prescription opiate use   • Normocytic anemia   • Hypothyroidism (acquired)   • Essential hypertension     Past Medical History:   Diagnosis Date   • Anxiety    • Arthritis    • Cancer (CMS/HCC)     uterine   • Chronic pain    • Depression    • Disease of thyroid gland    • Fibromyalgia    • Headache    • Hyperlipidemia     • Hypertension    • Incontinence    • Insomnia    • Leg pain    • Lumbar stenosis    • Peptic ulcer    • Restless legs    • Vaginal bleeding      Past Surgical History:   Procedure Laterality Date   • BLADDER REPAIR  2011    MESH HAD TO BE REMOVED IN 2013   • BREAST BIOPSY Right 2017    benign   • BREAST CYST EXCISION Left 1982   • CARPAL TUNNEL RELEASE     • CATARACT EXTRACTION W/ INTRAOCULAR LENS  IMPLANT, BILATERAL     • CYSTECTOMY     • D&C HYSTEROSCOPY N/A 11/6/2017    Procedure: DILATATION AND CURETTAGE HYSTEROSCOPY;  Surgeon: Shasta Madrigal MD;  Location: [facility] OR;  Service:    • DILATION AND CURETTAGE, DIAGNOSTIC / THERAPEUTIC  2008   • ENDOSCOPY  09/23/2010    Short segment of Arriola's,Moderate chroninc esophagogastritis and negative H.pylori   • ENDOSCOPY N/A 9/25/2017    Procedure: ESOPHAGOGASTRODUODENOSCOPY WITH ANESTHESIA;  Surgeon: Tom Velasco DO;  Location:  [facility] ENDOSCOPY;  Service:    • HYSTERECTOMY  12/20/2017   • ORIF TIBIA/FIBULA FRACTURES Left 2000   • TRANSVAGINAL TAPING SUSPENSION N/A 11/6/2017    Procedure: VAGINAL MESH REVISION;  Surgeon: Shasta Madrigal MD;  Location:  [facility] OR;  Service:    • VAGINAL MESH REVISION  2013          OT ASSESSMENT FLOWSHEET (last 12 hours)      Occupational Therapy Evaluation     Row Name 04/12/19 0827                   OT Evaluation Time/Intention    Subjective Information  complains of;pain;fatigue;weakness  -MW        Document Type  evaluation  -        Mode of Treatment  occupational therapy  -MW        Comment  eval performed at 959  -MW           General Information    Patient Profile Reviewed?  yes  -MW        Onset of Illness/Injury or Date of Surgery  04/11/19  -        Referring Physician  Dr. Hay  -        Patient Observations  alert;cooperative;agree to therapy  -MW        Patient/Family Observations  no family present  -        General Observations of Patient  awake in fowlers, appears lethargic with difficulty keeping eyes  open, IV, SCDs  -MW        Prior Level of Function  independent:;all household mobility;community mobility;ADL's;max assist:;cooking;cleaning;dependent:;driving;shopping  -MW        Equipment Currently Used at Home  cane, straight;shower chair  -MW        Pertinent History of Current Functional Problem  S/p episode where pt became obtunded for 4 hrs, has had intermittent episodes since. Family reports this is normal for pt. Dx: rhabdomyolysis, acute renal failure on stage 3 CKD, acute resp failure with hypoxia. C/o BLE and low back pain w hx of chronic pain syndrome, non compliant with pain meds.  -MW        Existing Precautions/Restrictions  fall  -MW        Limitations/Impairments  safety/cognitive  -MW        Risks Reviewed  patient:;LOB;nausea/vomiting;dizziness;increased discomfort;change in vital signs;increased drainage;lines disloged  -MW        Benefits Reviewed  patient:;improve function;increase independence;increase strength;increase balance;decrease pain;decrease risk of DVT;improve skin integrity;increase knowledge  -MW           Relationship/Environment    Lives With  spouse;child(sebastian), adult  -MW        Name(s) of Who Lives With Patient  daughter is back and forth from her home and pt's, assists with home management and shopping/errands  -MW           Resource/Environmental Concerns    Current Living Arrangements  home/apartment/condo  -MW           Home Main Entrance    Number of Stairs, Main Entrance  two  -MW        Stair Railings, Main Entrance  none  -MW           Cognitive Assessment/Interventions    Additional Documentation  Cognitive Assessment/Intervention (Group)  -MW           Cognitive Assessment/Intervention- PT/OT    Affect/Mental Status (Cognitive)  flat/blunted affect;low arousal/lethargic  -MW        Orientation Status (Cognition)  oriented to;person;place;situation;verbal cues/prompts needed for orientation;time  -MW        Follows Commands (Cognition)  follows one step  commands;75-90% accuracy;repetition of directions required;verbal cues/prompting required  -        Safety Deficit (Cognitive)  awareness of need for assistance;insight into deficits/self awareness;safety precautions awareness;judgment  -        Personal Safety Interventions  fall prevention program maintained;gait belt;nonskid shoes/slippers when out of bed;supervised activity;elopement precautions initiated  -           Safety Issues, Functional Mobility    Safety Issues Affecting Function (Mobility)  awareness of need for assistance;insight into deficits/self awareness;judgment;safety precautions follow-through/compliance;safety precaution awareness  -        Impairments Affecting Function (Mobility)  balance;cognition;endurance/activity tolerance;pain  -           Bed Mobility Assessment/Treatment    Bed Mobility Assessment/Treatment  scooting/bridging;supine-sit;sit-supine  -        Scooting/Bridging [place] (Bed Mobility)  supervision;verbal cues  -        Supine-Sit [place] (Bed Mobility)  supervision  -        Sit-Supine [place] (Bed Mobility)  supervision  -           Functional Mobility    Functional Mobility- Ind. Level  contact guard assist  -        Functional Mobility- Device  rolling walker  -        Functional Mobility- Comment  took a couple steps at EOB, 2 doctors arrived to room and pt fatigued at end of assessments deferring further mobility  -           Transfer Assessment/Treatment    Transfer Assessment/Treatment  sit-stand transfer;stand-sit transfer  -           Sit-Stand Transfer    Sit-Stand [place] (Transfers)  contact guard  -        Assistive Device (Sit-Stand Transfers)  walker, front-wheeled  -           Stand-Sit Transfer    Stand-Sit [place] (Transfers)  contact guard  -        Assistive Device (Stand-Sit Transfers)  walker, front-wheeled  -           ADL Assessment/Intervention    BADL Assessment/Intervention  lower body  dressing  -MW           Lower Body Dressing Assessment/Training    Lower Body Dressing [place] Level  don;socks;supervision  -MW        Lower Body Dressing Position  long sitting  -MW           BADL Safety/Performance    Impairments, BADL Safety/Performance  balance;cognition;endurance/activity tolerance  -MW        Cognitive Impairments, BADL Safety/Performance  awareness, need for assistance;insight into deficits/self awareness;judgment;safety precaution awareness;safety precaution follow-through  -MW           General ROM    GENERAL ROM COMMENTS  AROM WFL BUE  -MW           MMT (Manual Muscle Testing)    General MMT Comments  BUE grossly 4/5  -MW           Motor Assessment/Interventions    Additional Documentation  Balance (Group);Fine Motor Testing & Training (Group)  -MW           Balance    Balance  static sitting balance;static standing balance;dynamic standing balance  -MW           Static Sitting Balance    Level of [place] (Unsupported Sitting, Static Balance)  standby assist  -MW        Sitting Position (Unsupported Sitting, Static Balance)  sitting on edge of bed  -MW           Static Standing Balance    Level of [place] (Supported Standing, Static Balance)  contact guard assist  -MW        Assistive Device Utilized (Supported Standing, Static Balance)  walker, rolling  -MW           Dynamic Standing Balance    Level of [place], Reaches Outside Midline (Standing, Dynamic Balance)  contact guard assist  -MW        Assistive Device Utilized (Supported Standing, Dynamic Balance)  walker, rolling  -MW           Fine Motor Testing & Training    Comment, Fine Motor Coordination  opposition intact B  -MW           Sensory Assessment/Intervention    Sensory General Assessment  no sensation deficits identified  -MW           Positioning and Restraints    Pre-Treatment Position  in bed  -MW        Post Treatment Position  bed  -MW        In Bed  fowlers;call light within reach;encouraged to  call for assist;exit alarm on;side rails up x2;with nsg  -MW           Pain Assessment    Additional Documentation  Pain Scale: FACES Pre/Post-Treatment (Group)  -           Pain Scale: Numbers Pre/Post-Treatment    Pre/Post Treatment Pain Comment  generalized, obs function does not match report of pain  -MW           Pain Scale: FACES Pre/Post-Treatment    Pain: FACES Scale, Pretreatment  6-->hurts even more  -MW        Pain: FACES Scale, Post-Treatment  6-->hurts even more  -MW           Plan of Care Review    Plan of Care Reviewed With  patient  -MW           Clinical Impression (OT)    Date of Referral to OT  04/11/19  -MW        OT Diagnosis  decreased ADL  -MW        Prognosis (OT Eval)  good  -MW        Patient/Family Goals Statement (OT Eval)  return home  -        Criteria for Skilled Therapeutic Interventions Met (OT Eval)  yes;treatment indicated  -        Rehab Potential (OT Eval)  good, to achieve stated therapy goals  -        Therapy Frequency (OT Eval)  3 times/wk  -        Care Plan Review (OT)  evaluation/treatment results reviewed;care plan/treatment goals reviewed;risks/benefits reviewed;current/potential barriers reviewed;patient/other agree to care plan  -        Anticipated Discharge Disposition (OT)  home with 24/7 care;home with home health;transitional care  -           Planned OT Interventions    Planned Therapy Interventions (OT Eval)  activity tolerance training;BADL retraining;functional balance retraining;occupation/activity based interventions;patient/caregiver education/training;transfer/mobility retraining  -           OT Goals    Transfer Goal Selection (OT)  transfer, OT goal 1  -MW        Balance Goal Selection (OT)  balance, OT goal 1  -MW        Safety Awareness Goal Selection (OT)  safety awareness, OT goal 1  -MW        Additional Documentation  Balance Goal Selection (OT) (Row);Safety Awareness Goal Selection (OT) (Row)  -           Transfer Goal 1 (OT)     Activity/Assistive Device (Transfer Goal 1, OT)  sit-to-stand/stand-to-sit;bed-to-chair/chair-to-bed;toilet;tub  -MW        [place] Level/Cues Needed (Transfer Goal 1, OT)  standby assist  -MW        Time Frame (Transfer Goal 1, OT)  long term goal (LTG);by discharge  -MW        Progress/Outcome (Transfer Goal 1, OT)  goal ongoing  -MW           Balance Goal 1 (OT)    Activity/Assistive Device (Balance Goal 1, OT)  standing, dynamic in context of ADL/functional reaching task  -MW        [place] Level/Cues Needed (Balance Goal 1, OT)  standby assist  -MW        Time Frame (Balance Goal 1, OT)  long term goal (LTG);by discharge  -MW        Progress/Outcomes (Balance Goal 1, OT)  goal ongoing  -MW           Safety Awareness Goal 1 (OT)    Activity (Safety Awareness Goal 1, OT)  insight into deficits/self awareness;safe use of assistive device/equipment;follow through of safety precautions;demonstration of safe behaviors;demonstrates understanding  -MW        [place]/Cues/Accuracy (Safety Awareness Goal 1, OT)  with 90% accuracy  -MW        Time Frame (Safety Awareness Goal 1, OT)  long term goal (LTG);by discharge  -MW        Progress/Outcome (Safety Awareness Goal 1, OT)  goal ongoing  -MW           Living Environment    Home Accessibility  tub/shower is not walk in;stairs to enter home  -MW          User Key  (r) = Recorded By, (t) = Taken By, (c) = Cosigned By    Initials Name Effective Dates    María Wong, OTR/L 08/28/18 -          Occupational Therapy Education     Title: PT OT SLP Therapies (In Progress)     Topic: Occupational Therapy (Done)     Point: ADL training (Done)     Description: Instruct learner(s) on proper safety adaptation and remediation techniques during self care or transfers.   Instruct in proper use of assistive devices.    Learning Progress Summary           Patient Acceptance, E, VU by CITLALI at 4/12/2019  3:21 PM    Comment:  ADL, transfer, safety awareness, benefit of  increased activity, OT POC                               User Key     Initials Effective Dates Name Provider Type Discipline     08/28/18 -  María Espinoza, OTR/L Occupational Therapist OT                  OT Recommendation and Plan  Outcome Summary/Treatment Plan (OT)  Anticipated Discharge Disposition (OT): home with 24/7 care, home with home health, transitional care  Planned Therapy Interventions (OT Eval): activity tolerance training, BADL retraining, functional balance retraining, occupation/activity based interventions, patient/caregiver education/training, transfer/mobility retraining  Therapy Frequency (OT Eval): 3 times/wk  Plan of Care Review  Plan of Care Reviewed With: patient  Plan of Care Reviewed With: patient  Outcome Summary: OT eval completed. Pt lethargic with difficulty keeping eyes open initially. Orientedx3 but with conflicting stories throughout eval regarding PLOF/recent events. Adjusts socks in long sitting in bed, S with use of bed rail to come to EOB. FMC/GMC intact. Strength grossly 4/5 BUE. Pt stands CGA w use of RW and takes a couple steps toward HOB. C/o increased fatigue with minimal activity requiring seated rest. Too fatigued following MD assessments for continued assessment, returned to supine CGA. Pt c/o generalized pain throughout eval though has no obs functional implications d/t rate of pain. Demos decreased safety awareness and need for assist throughout requiring supervision for tasks. SKilled OT required to address ADL and functional mobilty to increase safety and independence. Recommend d/c home w 24/7 care and HH pending progress.    Outcome Measures     Row Name 04/12/19 1500 04/12/19 1400          How much help from another person do you currently need...    Turning from your back to your side while in flat bed without using bedrails?  --  3  (Pended)   -GR     Moving from lying on back to sitting on the side of a flat bed without bedrails?  --  2  (Pended)   -GR      Moving to and from a bed to a chair (including a wheelchair)?  --  2  (Pended)   -GR     Standing up from a chair using your arms (e.g., wheelchair, bedside chair)?  --  2  (Pended)   -GR     Climbing 3-5 steps with a railing?  --  2  (Pended)   -GR     To walk in hospital room?  --  2  (Pended)   -GR     AM-PAC 6 Clicks Score  --  13  (Pended)   -MW (r) GR (t)        How much help from another is currently needed...    Putting on and taking off regular lower body clothing?  3  -MW  --     Bathing (including washing, rinsing, and drying)  3  -MW  --     Toileting (which includes using toilet bed pan or urinal)  3  -MW  --     Putting on and taking off regular upper body clothing  3  -MW  --     Taking care of personal grooming (such as brushing teeth)  4  -MW  --     Eating meals  4  -MW  --     Score  20  -MW  --        Functional Assessment    Outcome Measure Options  AM-PAC 6 Clicks Daily Activity (OT)  -  AM-[facility] 6 Clicks Basic Mobility (PT)  (Pended)   -       User Key  (r) = Recorded By, (t) = Taken By, (c) = Cosigned By    Initials Name Provider Type    MW María Espinoza OTR/L Occupational Therapist    GR Jana Ng, PT Student PT Student          Time Calculation:   Time Calculation- OT     Row Name 04/12/19 1521             Time Calculation- OT    OT Start Time  0959 +15 min chart review  -MW      OT Stop Time  1059  -      OT Time Calculation (min)  60 min  -      OT Received On  04/12/19  -      OT Goal Re-Cert Due Date  04/22/19  -        User Key  (r) = Recorded By, (t) = Taken By, (c) = Cosigned By    Initials Name Provider Type     María Espinoza OTR/L Occupational Therapist        Therapy Charges for Today     Code Description Service Date Service Provider Modifiers Qty    [id number]  OT EVAL LOW COMPLEXITY 4 4/12/2019 María Espinoza OTR/L GO 1    [id number]  OT THERAPEUTIC ACT EA 15 MIN 4/12/2019 María Espinoza OTR/L GO 1               María Espinoza  OTR/L  4/12/2019

## 2019-04-12 NOTE — PLAN OF CARE
Problem: Fall Risk (Adult)  Goal: Identify Related Risk Factors and Signs and Symptoms  Outcome: Ongoing (interventions implemented as appropriate)    Goal: Absence of Fall  Outcome: Ongoing (interventions implemented as appropriate)    Goal: Identify Related Risk Factors and Signs and Symptoms  Outcome: Ongoing (interventions implemented as appropriate)    Goal: Absence of Fall  Outcome: Ongoing (interventions implemented as appropriate)      Problem: Patient Care Overview  Goal: Plan of Care Review   04/12/19 0415   Plan of Care Review   Progress no change   OTHER   Outcome Summary Pt has become more alert and oriented towards the end of the shift. Very drowsy and sleepy at the beginning of shift. Pt is also very weak, it takes two people to get her up to BSC. Pt complained of a headache. Tylenol given X1. Cont to monitor. Safety maintained.    Coping/Psychosocial   Plan of Care Reviewed With patient       Problem: Renal Failure/Kidney Injury, Acute (Adult)  Goal: Signs and Symptoms of Listed Potential Problems Will be Absent, Minimized or Managed (Renal Failure/Kidney Injury, Acute)  Outcome: Ongoing (interventions implemented as appropriate)      Problem: Confusion, Acute (Adult)  Goal: Identify Related Risk Factors and Signs and Symptoms  Outcome: Ongoing (interventions implemented as appropriate)    Goal: Cognitive/Functional Impairments Minimized  Outcome: Ongoing (interventions implemented as appropriate)    Goal: Safety  Outcome: Ongoing (interventions implemented as appropriate)      Problem: Pain, Chronic (Adult)  Goal: Identify Related Risk Factors and Signs and Symptoms  Outcome: Ongoing (interventions implemented as appropriate)    Goal: Acceptable Pain/Comfort Level and Functional Ability  Outcome: Ongoing (interventions implemented as appropriate)

## 2019-04-12 NOTE — CONSULTS
NEUROSURGERY INITIAL HOSPITAL ENCOUNTER    Assessment/Plan:   Jennifer Gomes is a 78 y.o. female.  She  has a past medical history of Anxiety, Arthritis, Cancer, Chronic pain, Depression, Disease of thyroid gland, Fibromyalgia, Headache, Hyperlipidemia, Hypertension, Incontinence, Insomnia, Leg pain, Lumbar stenosis, Peptic ulcer, Restless legs, and Vaginal bleeding. She presents with a new known problem of chronic lower back and bilateral leg pain. Physical exam findings of positive left straight leg raise, otherwise relatively normal neurologic exam.  Their imaging shows multilevel degenerative changes and severe central canal stenosis at L4-5.  With Dr. Brady as a witness to our conversation, Ms. Gomes states she is not interested in pursuing surgical intervention at this time.    Differential Diagnosis:   Chronic lower back pain  Stenosis of the lumbar spine at L4-5    Recommendations:  X-rays of the lumbar spine complete with flexion and extension  Outpatient physical therapy  Follow-up in neurosurgical clinic in 2 weeks for reassessment    I discussed the patients findings and my recommendations with patient and consulting provider    Thank you very much for this interesting consult.     Level of Risk: Low due to:  one stable chronic illnesss  MDM: Moderate Complexity  Mod = 46593, High=99223  ___________________________________________________________________    Reason for consult: Lumbar back pain   Chief Complaint:   Chief Complaint   Patient presents with   • Altered Mental Status     HPI: Jennifer Gomes is a 78 y.o. female with a significant comorbidity of CKD stage III, anxiety, Arthritis, Cancer, Chronic pain, Depression, Disease of thyroid gland, Fibromyalgia, Headache, Hyperlipidemia, Hypertension, Incontinence, Insomnia, Leg pain, Lumbar stenosis, Peptic ulcer, Restless legs, and Vaginal bleeding.  Ms. Gomes was evaluated in the ED and admitted to the hospital for confusion and altered mental status.   CT of the brain was stable.  Ms. kwan was found to have cystitis with hematuria.  Currently treated with antibiotics.    She complains today of lower back pain.  Gradual progressive onset over the past 2 years.  She denies injury.  She denies previous spine surgeries.  She states her lower back discomfort is intermittent in nature.  Her discomfort worsens with movement and decreases with rest and use of oxycodone which she obtains from Dr. Garcia.  She additionally reports intermittent sharp pains that radiate down the posterior aspect of the bilateral legs to the knees.  Currently walks with 2 canes to assist with ambulation.  She denies fevers, chills, unexplained weight loss, saddle anesthesia, numbness, paresthesias, or bowel or bladder dysfunction.  She has been treated with oral pain medications today.  Currently rates severity of discomfort 2/10.  No additional concerns at this time.    Review of Systems   Constitutional: Negative.    HENT: Negative.    Eyes: Negative.    Respiratory: Negative.    Cardiovascular: Negative.    Gastrointestinal: Negative.    Endocrine: Negative.    Genitourinary: Negative.    Musculoskeletal: Positive for back pain and gait problem.   Skin: Negative.    Allergic/Immunologic: Negative.    Hematological: Negative.    Psychiatric/Behavioral: Negative.    All other systems reviewed and are negative.     Past Medical History:  has a past medical history of Anxiety, Arthritis, Cancer (CMS/HCC), Chronic pain, Depression, Disease of thyroid gland, Fibromyalgia, Headache, Hyperlipidemia, Hypertension, Incontinence, Insomnia, Leg pain, Lumbar stenosis, Peptic ulcer, Restless legs, and Vaginal bleeding.    Past Surgical History:  has a past surgical history that includes Cystectomy; Esophagogastroduodenoscopy (09/23/2010); Dilation and curettage, diagnostic / therapeutic (2008); Bladder repair (2011); ORIF tibia & fibula fractures (Left, 2000); Vaginal Mesh Revision (2013); Cataract  extraction w/ intraocular lens  implant, bilateral; Carpal tunnel release; Esophagogastroduodenoscopy (N/A, 9/25/2017); transvaginal taping suspension (N/A, 11/6/2017); d&c hysteroscopy (N/A, 11/6/2017); Hysterectomy (12/20/2017); Breast biopsy (Right, 2017); and Breast cyst excision (Left, 1982).    Family History: family history includes Cancer in her paternal grandmother; Diabetes in her mother and sister; Lung cancer in her paternal grandfather; Lymphoma in her brother; Multiple myeloma in her mother; Ovarian cancer in her paternal aunt; Prostate cancer in her brother.    Social History:  reports that she has never smoked. She has never used smokeless tobacco. She reports that she does not drink alcohol or use drugs.    Allergies: Ropinirole hcl; Codeine; Ambien [zolpidem]; Eszopiclone; Pregabalin; and Tizanidine    Home Medications:   Current Facility-Administered Medications:   •  acetaminophen (TYLENOL) tablet 650 mg, 650 mg, Oral, Q4H PRN, Kierra Schroeder, APRN, 650 mg at 04/12/19 0222  •  atorvastatin (LIPITOR) tablet 40 mg, 40 mg, Oral, Daily, Kierra Schroeder APRJOCELYNN, 40 mg at 04/11/19 1636  •  carbidopa-levodopa (SINEMET)  MG per tablet 1 tablet, 1 tablet, Oral, 4x Daily, Kierra Schroeder APRN, 1 tablet at 04/11/19 2147  •  carvedilol (COREG) tablet 12.5 mg, 12.5 mg, Oral, BID With Meals, Kierra Schroeder APRN, 12.5 mg at 04/11/19 1836  •  cefTRIAXone (ROCEPHIN) 1 g/100 mL 0.9% NS (MBP), 1 g, Intravenous, Q24H, Kierra Schroeder APRN  •  cyclobenzaprine (FLEXERIL) tablet 10 mg, 10 mg, Oral, BID PRN, Kierra Schroeder, APRN  •  diltiaZEM CD (CARDIZEM CD) 24 hr capsule 240 mg, 240 mg, Oral, Q24H, Kierra Schroeder APRN, 240 mg at 04/11/19 1636  •  docusate sodium (COLACE) capsule 100 mg, 100 mg, Oral, BID, Angel Hay, DO  •  levothyroxine (SYNTHROID, LEVOTHROID) tablet 50 mcg, 50 mcg, Oral, Alexandre HOLLIDAY Terri D, MORGAN, 50 mcg at 04/12/19 0648  •  losartan (COZAAR) tablet 50 mg, 50 mg,  Oral, Q24H, Kierra Schroeder APRN, 50 mg at 04/11/19 1636  •  ondansetron (ZOFRAN) tablet 4 mg, 4 mg, Oral, Q6H PRN **OR** ondansetron (ZOFRAN) injection 4 mg, 4 mg, Intravenous, Q6H PRN, Kierra Schroeder, APRN  •  oxyCODONE (ROXICODONE) immediate release tablet 15 mg, 15 mg, Oral, Q6H PRN, Kierra Schroeder, APRN, 15 mg at 04/12/19 0606  •  pantoprazole (PROTONIX) EC tablet 40 mg, 40 mg, Oral, QAM, Kierra Schroeder APRN, 40 mg at 04/12/19 0607  •  polyethylene glycol 3350 powder (packet), 17 g, Oral, Daily, Angel Hay,   •  [COMPLETED] Insert peripheral IV, , , Once **AND** sodium chloride 0.9 % flush 10 mL, 10 mL, Intravenous, PRN, Han Dutta DO  •  sodium chloride 0.9 % flush 3 mL, 3 mL, Intravenous, Q12H, Kierra Schroeder APRN, 3 mL at 04/11/19 2147  •  sodium chloride 0.9 % flush 3-10 mL, 3-10 mL, Intravenous, PRN, Kierra Schroeder, APRN  •  sodium chloride 0.9 % infusion, 75 mL/hr, Intravenous, Continuous, Angel Hay DO, Last Rate: 125 mL/hr at 04/12/19 0123, 125 mL/hr at 04/12/19 0123    Medications: Scheduled Meds:  atorvastatin 40 mg Oral Daily   carbidopa-levodopa 1 tablet Oral 4x Daily   carvedilol 12.5 mg Oral BID With Meals   ceftriaxone 1 g Intravenous Q24H   diltiaZEM  mg Oral Q24H   docusate sodium 100 mg Oral BID   levothyroxine 50 mcg Oral QAM   losartan 50 mg Oral Q24H   pantoprazole 40 mg Oral QAM   polyethylene glycol 17 g Oral Daily   sodium chloride 3 mL Intravenous Q12H     Continuous Infusions:  sodium chloride 75 mL/hr Last Rate: 125 mL/hr (04/12/19 0123)     PRN Meds:.•  acetaminophen  •  cyclobenzaprine  •  ondansetron **OR** ondansetron  •  oxyCODONE  •  [COMPLETED] Insert peripheral IV **AND** sodium chloride  •  sodium chloride    Vital Signs  Temp:  [97.8 °F (36.6 °C)-98.5 °F (36.9 °C)] 97.9 °F (36.6 °C)  Heart Rate:  [] 88  Resp:  [16-20] 20  BP: (109-155)/(47-80) 144/59    Physical Exam  Physical Exam   Constitutional: She is oriented to  person, place, and time. She appears well-developed and well-nourished.  Non-toxic appearance. She does not have a sickly appearance. She does not appear ill. No distress.   Eyes: EOM are normal. Pupils are equal, round, and reactive to light.   Neurological: She is oriented to person, place, and time.   Psychiatric: Her speech is normal.   Nursing note and vitals reviewed.      Neurologic Exam     Mental Status   Oriented to person, place, and time.   Attention: normal. Concentration: normal.   Speech: speech is normal   Level of consciousness: alert    Cranial Nerves     CN II   Visual fields full to confrontation.     CN III, IV, VI   Pupils are equal, round, and reactive to light.  Extraocular motions are normal.     CN V   Facial sensation intact.     CN VII   Facial expression full, symmetric.     CN VIII   CN VIII normal.     CN IX, X   CN IX normal.     CN XI   CN XI normal.     Motor Exam   Muscle bulk: normal  Overall muscle tone: normal  Right arm tone: normal  Left arm tone: normal  Right arm pronator drift: absent  Left arm pronator drift: absent  Right leg tone: normal  Left leg tone: normal    Strength   Right deltoid: 5/5  Left deltoid: 5/5  Right biceps: 5/5  Left biceps: 5/5  Right triceps: 5/5  Left triceps: 5/5  Right wrist extension: 5/5  Left wrist extension: 5/5  Right iliopsoas: 5/5  Left iliopsoas: 5/5  Right quadriceps: 5/5  Left quadriceps: 5/5  Right anterior tibial: 5/5  Left anterior tibial: 5/5  Right posterior tibial: 5/5  Left posterior tibial: 5/5  Positive left straight leg raise. Moves all extremities equal and symmetric.      Sensory Exam   Light touch normal.     Normal sensation to upper and lower extremities.      Gait, Coordination, and Reflexes     Tremor   Resting tremor: absent  Intention tremor: absent  Action tremor: absent    Reflexes   Right plantar: normal  Left plantar: normal  Right Rubin: absent  Left Rubin: absent  Right ankle clonus: absent  Left ankle  clonus: absent  Right pendular knee jerk: absent  Left pendular knee jerk: absent    Results Review:   Independent review and interpretation of imaging    Noncontrast MRI of the lumbar spine performed on 4/11/19, shows no acute fractures, normal preservation of the lumbar lordosis, bone marrow signal is normal, conus at a normal level, no evidence of STIR signal changes, multilevel T2 signal changes suggesting facet arthropathy, anterior listhesis of L4, multilevel disc protrusions worse at L4-5 causing severe spinal stenosis.      Imaging Results (last 24 hours)     Procedure Component Value Units Date/Time    MRI Lumbar Spine Without Contrast [[id number]] Collected:  04/11/19 2123     Updated:  04/11/19 2133    Narrative:       MRI LUMBAR SPINE WO CONTRAST- 4/11/2019 5:48 PM CDT     HISTORY: Low back pain, rapidly progressive neuro deficit;  M62.82-Rhabdomyolysis; N17.9-Acute kidney failure, unspecified;  N30.01-Acute cystitis with hematuria; A41.9-Sepsis, unspecified  organism; F99-Mental disorder, not otherwise specified     COMPARISON: None      TECHNIQUE: Multiplanar, multisequence MRI of the lumbar spine was  performed without the use of contrast.     FINDINGS:      Alignment: There are presumed to be 5 lumbar-type vertebrae with the  most inferior being labeled L5. There is a normal lumbar lordosis. Grade  1 anterolisthesis of L4 on L5 is present felt to represent subluxation  at the level of the facets. Degenerative disc disease is noted  throughout the lumbar spine with relative sparing of the L3-L4 level.  There is narrowing of disc space height and loss of hydration on the  more heavily T2-weighted images..     Marrow signal: No pathologic marrow infiltrate is demonstrated. The  vertebral body heights and posterior elements are maintained.      Cord/Canal: The conus medullaris terminates at the level of L1. The  visualized spinal cord is normal in signal and morphology.      Soft tissues: The surrounding  soft tissues are unremarkable.      Levels:      L1-L2: There is disc desiccation with mild bulging of the disc. Moderate  facet and mild ligamentous hypertrophy is present. There is no evidence  of central spinal stenosis. No neural foraminal narrowing is present..      L2-L3: There is disc desiccation with mild bulging of the disc. Moderate  facet and ligamentous hypertrophy is present. Broad-based foraminal and  lateral disc protrusions are present bilaterally. There is no evidence  of central spinal stenosis. The lateral disc protrusions combined with  the facet hypertrophy contribute to mild bilateral neural foraminal  narrowing..      L3-L4: There is mild bulging of the disc as well as moderate facet and  ligamentous hypertrophy. Lateral and foraminal protrusion of disc  material results in mild bilateral neural foraminal narrowing (left  greater than right). There is no central stenosis..      L4-L5: There is severe facet arthropathy at the L4-L5 level as well as  ligamentous hypertrophy. There is broad-based bulging of the disc. The  combination of findings results in severe central spinal stenosis with  marked effacement of the thecal sac. Moderate bilateral neural foraminal  narrowing is also present related to the broad-based bulging of the disc  and ligamentous hypertrophy..      L5-S1: Moderate to severe facet and ligamentous hypertrophy is noted at  this level. There is mild bulging of the disc. No evidence of central  stenosis. The bulging the disc and ligamentous hypertrophy result in  moderate left-sided and mild right-sided foraminal narrowing..        Impression:       1. Degenerative disc disease with bulging of the disc is noted at  multiple levels. This combined with facet and ligamentous hypertrophy  contributes to central and foraminal stenosis as described above.  2. The most significant finding is at the L4-L5 level where there is  severe facet arthropathy and ligamentous hypertrophy. There  is also  broad-based bulging of the disc resulting in central stenosis which is  severe in nature. Moderate bilateral neural foraminal narrowing is also  present. There is grade 1 anterolisthesis of L4 upon L5 secondary to  subluxation at the level of the facets..        This report was finalized on 04/11/2019 21:30 by Dr. Chi Hinds MD.    XR Abdomen KUB [[id number]] Collected:  04/11/19 1339     Updated:  04/11/19 1344    Narrative:       EXAMINATION:  XR ABDOMEN KUB-  4/11/2019 1:10 PM CDT     HISTORY: abdominal pain; M62.82-Rhabdomyolysis; N17.9-Acute kidney  failure, unspecified; N30.01-Acute cystitis with hematuria;  A41.9-Sepsis, unspecified organism; F99-Mental disorder, not otherwise  specified      COMPARISON: No comparison study.     TECHNIQUE: Single view, 2 images: KUB     FINDINGS:      There is a large amount of stool observed throughout the colon without  dilated bowel loops to indicate obstruction. Constipation suspected.     There are multiple calcific densities in the pelvis, likely phleboliths.     There is no organomegaly.     Degenerative changes noted in the thoracolumbar spine with osteophyte  formation.       Impression:       1. Suspect constipation. No dilated bowel loops indicate obstruction.  This report was finalized on 04/11/2019 13:40 by Dr. Carlos Peralta MD.    CT Head Without Contrast [[id number]] Collected:  04/11/19 1215     Updated:  04/11/19 1226    Narrative:       EXAMINATION:  CT HEAD WO CONTRAST-  4/11/2019 12:05 PM CDT     HISTORY: Altered mental status.     TECHNIQUE: Multiple axial images were obtained through the brain without  contrast infusion. Multiplanar images were reconstructed.     DLP: 762 mGy-cm. Automated dosage control was utilized.     COMPARISON: 02/26/2013.     FINDINGS: There are no hemorrhage, edema or mass effect. There is mild  atrophy with associated ventricular prominence. There is low density in  the white matter. Vascular calcification is  noted. The visualized  paranasal sinuses and mastoid air cells are clear. No calvarial fracture  is seen.       Impression:       1. No hemorrhage, edema or mass effect.  2. Mild atrophy with associated ventricular prominence.  3. Low density in the white matter is nonspecific and likely due to  chronic small vessel. Vascular calcification is noted.     The full report of this exam was immediately signed and available to the  emergency room. The patient is currently in the emergency room.     This report was finalized on 04/11/2019 12:23 by Dr. Rcikey Win MD.    XR Chest 1 View [[id number]] Collected:  04/11/19 1157     Updated:  04/11/19 1202    Narrative:       History:  78-year-old with confusion and shortness of breath.     Reference:  Chest radiograph October 2017.     Findings:  Frontal chest radiograph performed.     Normal heart size. The lungs are clear. No pleural fluid or  pneumothorax. No acute osseous abnormality. Degenerative changes of the  spine.          Impression:       No acute cardiopulmonary process.  This report was finalized on 04/11/2019 11:59 by Dr Partha Whitley, .        MRI brain:  MRI spine:         CT Head:  CT c-spine:  CT t-spine:  CT l-spine:  X-ray:    I reviewed the patient's new clinical results.  Lab Results (last 24 hours)     Procedure Component Value Units Date/Time    Urine Culture - Urine, Urine, Catheter In/Out [[id number]]  (Normal) Collected:  04/11/19 1058    Specimen:  Urine, Catheter In/Out Updated:  04/12/19 0740     Urine Culture No growth at 24 hours    Hemoglobin A1c [[id number]] Collected:  04/12/19 0532    Specimen:  Blood Updated:  04/12/19 0641     Hemoglobin A1C 4.8 %     Narrative:       Less than 6.0           Non-Diabetic Range  6.0-7.0                 ADA Therapeutic Target  Greater than 7.0        Action Suggested    TSH [[id number]]  (Normal) Collected:  04/12/19 0532    Specimen:  Blood Updated:  04/12/19 0625     TSH 0.600 mIU/mL     Myoglobin, Serum  [[id number]]  (Abnormal) Collected:  04/12/19 0532    Specimen:  Blood Updated:  04/12/19 0610     Myoglobin 474.1 ng/mL     Troponin [[id number]]  (Abnormal) Collected:  04/12/19 0532    Specimen:  Blood Updated:  04/12/19 0606     Troponin I 0.141 ng/mL     Lipid Panel [[id number]]  (Abnormal) Collected:  04/12/19 0532    Specimen:  Blood Updated:  04/12/19 0605     Total Cholesterol 148 mg/dL      Triglycerides 111 mg/dL      HDL Cholesterol 39 mg/dL      LDL Cholesterol  86 mg/dL      LDL/HDL Ratio 2.23    CK [[id number]]  (Abnormal) Collected:  04/12/19 0532    Specimen:  Blood Updated:  04/12/19 0555     Creatine Kinase 773 U/L     Comprehensive Metabolic Panel [[id number]]  (Abnormal) Collected:  04/12/19 0532    Specimen:  Blood Updated:  04/12/19 0555     Glucose 101 mg/dL      BUN 32 mg/dL      Creatinine 1.21 mg/dL      Sodium 138 mmol/L      Potassium 4.2 mmol/L      Chloride 109 mmol/L      CO2 22.0 mmol/L      Calcium 8.6 mg/dL      Total Protein 5.2 g/dL      Albumin 2.80 g/dL      ALT (SGPT) <15 U/L      AST (SGOT) 44 U/L      Alkaline Phosphatase 74 U/L      Total Bilirubin 0.5 mg/dL      eGFR Non African Amer 43 mL/min/1.73      Globulin 2.4 gm/dL      A/G Ratio 1.2 g/dL      BUN/Creatinine Ratio 26.4     Anion Gap 7.0 mmol/L     Narrative:       GFR Normal >60  Chronic Kidney Disease <60  Kidney Failure <15    CBC (No Diff) [[id number]]  (Abnormal) Collected:  04/12/19 0532    Specimen:  Blood Updated:  04/12/19 0544     WBC 5.53 10*3/mm3      RBC 3.05 10*6/mm3      Hemoglobin 9.0 g/dL      Hematocrit 26.7 %      MCV 87.5 fL      MCH 29.5 pg      MCHC 33.7 g/dL      RDW 13.5 %      RDW-SD 42.5 fl      MPV 10.7 fL      Platelets 141 10*3/mm3     Blood Culture - Blood, Arm, Right [[id number]] Collected:  04/11/19 1057    Specimen:  Blood from Arm, Right Updated:  04/11/19 9015     Blood Culture No growth at less than 24 hours    Blood Culture - Blood, Arm, Left [[id number]] Collected:  04/11/19 1119     Specimen:  Blood from Arm, Left Updated:  04/11/19 2330     Blood Culture No growth at less than 24 hours    Procalcitonin [[id number]]  (Abnormal) Collected:  04/11/19 1110    Specimen:  Blood Updated:  04/11/19 1229     Procalcitonin 0.45 ng/mL     Narrative:       SIRS, sepsis, severe sepsis, and septic shock are categorized according to the criteria of the consensus conference of the American College of Chest Physicians/Society of Critical Care Medicine.    PCT < 0.5 ng/mL     Systemic infection (sepsis) is not likely.    PCT >0.5 and < 2.0 ng/mL Systemic infection (sepsis) is possible, but other conditions are known to elevate PCT as well.    PCT > 2.0 ng/mL     Systemic infection (sepsis) is likely, unless other causes are known.      PCT > 10.0 ng/mL    Important systemic inflammatory response, almost exclusively due to severe bacterial sepsis or septic shock.    PCT values of < 0.5 ng/mL do not exclude an infection, because localized infections (without systemic signs) may be associated with such low concentrations, or a systemic infection in its initial stages (<6 hours).  Increased PCT can occur without infection.  PCT concentrations between 0.5 and 2.0 ng/mL should be interpreted taking into account the patients history.  It is recommended to retest PCT within 6-24 hours if any concentrations < 2.0 ng/mL are obtained.    Lactic Acid, Plasma [[id number]]  (Normal) Collected:  04/11/19 1110    Specimen:  Blood Updated:  04/11/19 1217     Lactate 0.9 mmol/L     Myoglobin, Serum [[id number]]  (Abnormal) Collected:  04/11/19 1110    Specimen:  Blood Updated:  04/11/19 1212     Myoglobin 3,402.0 ng/mL     TSH [[id number]]  (Normal) Collected:  04/11/19 1110    Specimen:  Blood Updated:  04/11/19 1208     TSH 0.936 mIU/mL     Urine Drug Screen - Urine, Catheter [[id number]]  (Abnormal) Collected:  04/11/19 1058    Specimen:  Urine, Catheter Updated:  04/11/19 1207     Amphetamine Screen, Urine Negative      Barbiturates Screen, Urine Negative     Benzodiazepine Screen, Urine Positive     Cocaine Screen, Urine Negative     Methadone Screen, Urine Negative     Opiate Screen Positive     Phencyclidine (PCP), Urine Negative     THC, Screen, Urine Negative    Narrative:       Negative Thresholds For Drugs Screened in Urine:    Amphetamines          500 ng/ml  Barbiturates          200 ng/ml  Benzodiazepines       200 ng/ml  Cocaine               150 ng/ml  Methadone             150 ng/ml  Opiates               300 ng/ml  Phencyclidine         25 ng/ml  THC                      50 ng/ml    The normal value for all drugs tested is negative. This report includes final unconfirmed screening results.  A positive result by this assay can be, at your request, sent to the Reference Lab for confirmation by gas chromatography. Unconfirmed results must not be used for non-medical purposes, such as employment or legal testing. Clinical consideration should be applied to any drug of abuse test result, particularly when unconfirmed results are used.    T4, Free [[id number]]  (Normal) Collected:  04/11/19 1110    Specimen:  Blood Updated:  04/11/19 1154     Free T4 1.24 ng/dL     Urinalysis With Culture If Indicated - Urine, Catheter In/Out [[id number]]  (Abnormal) Collected:  04/11/19 1058    Specimen:  Urine, Catheter In/Out Updated:  04/11/19 1154     Color, UA Dark Yellow     Appearance, UA Clear     pH, UA <=5.0     Specific Gravity, UA 1.021     Glucose, UA Negative     Ketones, UA Negative     Bilirubin, UA Negative     Blood, UA Moderate (2+)     Protein, UA Trace     Leuk Esterase, UA Small (1+)     Nitrite, UA Negative     Urobilinogen, UA 0.2 E.U./dL    Urinalysis, Microscopic Only - Urine, Catheter In/Out [[id number]]  (Abnormal) Collected:  04/11/19 1058    Specimen:  Urine, Catheter In/Out Updated:  04/11/19 1154     RBC, UA 0-2 /HPF      WBC, UA 6-12 /HPF      Bacteria, UA None Seen /HPF      Squamous Epithelial Cells, UA 3-6  /HPF      Yeast, UA       Small/1+ Budding Yeast w/Hyphae     /HPF     Hyaline Casts, UA 0-2 /LPF      Fine Granular Casts, UA 0-2 /LPF      Methodology Manual Light Microscopy    BNP [[id number]]  (Normal) Collected:  04/11/19 1110    Specimen:  Blood Updated:  04/11/19 1148     proBNP 1,660.0 pg/mL     Troponin [[id number]]  (Abnormal) Collected:  04/11/19 1110    Specimen:  Blood Updated:  04/11/19 1148     Troponin I 0.131 ng/mL     Influenza Antigen, Rapid - Swab, Nasopharynx [[id number]]  (Normal) Collected:  04/11/19 1107    Specimen:  Swab from Nasopharynx Updated:  04/11/19 1141     Influenza A Ag, EIA Negative     Influenza B Ag, EIA Negative    Narrative:       Recommend confirmation of negative results by viral culture or molecular assay.    Ammonia [[id number]]  (Abnormal) Collected:  04/11/19 1110    Specimen:  Blood Updated:  04/11/19 1137     Ammonia <9 umol/L     Lipase [[id number]]  (Normal) Collected:  04/11/19 1110    Specimen:  Blood Updated:  04/11/19 1136     Lipase 33 U/L     Comprehensive Metabolic Panel [[id number]]  (Abnormal) Collected:  04/11/19 1110    Specimen:  Blood Updated:  04/11/19 1136     Glucose 107 mg/dL      BUN 45 mg/dL      Creatinine 2.26 mg/dL      Sodium 136 mmol/L      Potassium 4.5 mmol/L      Chloride 101 mmol/L      CO2 24.0 mmol/L      Calcium 9.3 mg/dL      Total Protein 6.7 g/dL      Albumin 4.00 g/dL      ALT (SGPT) 17 U/L      AST (SGOT) 49 U/L      Alkaline Phosphatase 100 U/L      Total Bilirubin 0.7 mg/dL      eGFR Non African Amer 21 mL/min/1.73      Globulin 2.7 gm/dL      A/G Ratio 1.5 g/dL      BUN/Creatinine Ratio 19.9     Anion Gap 11.0 mmol/L     Narrative:       GFR Normal >60  Chronic Kidney Disease <60  Kidney Failure <15    Ethanol [[id number]]  (Normal) Collected:  04/11/19 1110    Specimen:  Blood Updated:  04/11/19 1136     Ethanol % <0.010 %     Narrative:       Not for legal purposes. Chain of Custody not followed.     CK [[id number]]  (Abnormal)  Collected:  04/11/19 1110    Specimen:  Blood Updated:  04/11/19 1136     Creatine Kinase 1,295 U/L     Magnesium [[id number]]  (Abnormal) Collected:  04/11/19 1110    Specimen:  Blood Updated:  04/11/19 1136     Magnesium 2.3 mg/dL     D-dimer, Quantitative [[id number]]  (Abnormal) Collected:  04/11/19 1110    Specimen:  Blood Updated:  04/11/19 1136     D-Dimer, Quantitative 0.75 mg/L (FEU)     Narrative:       Reference Range is 0-0.50 mg/L FEU. However, results <0.50 mg/L FEU tends to rule out DVT or PE. Results >0.50 mg/L FEU are not useful in predicting absence or presence of DVT or PE.    CBC & Differential [[id number]] Collected:  04/11/19 1110    Specimen:  Blood Updated:  04/11/19 1126    Narrative:       The following orders were created for panel order CBC & Differential.  Procedure                               Abnormality         Status                     ---------                               -----------         ------                     CBC Auto Differential[[id number]]        Abnormal            Final result                 Please view results for these tests on the individual orders.    CBC Auto Differential [[id number]]  (Abnormal) Collected:  04/11/19 1110    Specimen:  Blood Updated:  04/11/19 1126     WBC 9.34 10*3/mm3      RBC 3.67 10*6/mm3      Hemoglobin 10.6 g/dL      Hematocrit 32.2 %      MCV 87.7 fL      MCH 28.9 pg      MCHC 32.9 g/dL      RDW 13.5 %      RDW-SD 43.0 fl      MPV 10.6 fL      Platelets 178 10*3/mm3      Neutrophil % 75.3 %      Lymphocyte % 12.8 %      Monocyte % 10.0 %      Eosinophil % 0.7 %      Basophil % 0.2 %      Immature Grans % 1.0 %      Neutrophils, Absolute 7.03 10*3/mm3      Lymphocytes, Absolute 1.20 10*3/mm3      Monocytes, Absolute 0.93 10*3/mm3      Eosinophils, Absolute 0.07 10*3/mm3      Basophils, Absolute 0.02 10*3/mm3      Immature Grans, Absolute 0.09 10*3/mm3      nRBC 0.0 /100 WBC     Blood Gas, Arterial [[id number]]  (Abnormal) Collected:  04/11/19  1055    Specimen:  Arterial Blood Updated:  04/11/19 1103     Site Right Radial     Donny's Test Positive     pH, Arterial 7.258 pH units      Comment: 84 Value below reference range        pCO2, Arterial 49.9 mm Hg      Comment: 83 Value above reference range        pO2, Arterial 66.3 mm Hg      Comment: 84 Value below reference range        HCO3, Arterial 22.3 mmol/L      Base Excess, Arterial -4.9 mmol/L      Comment: 84 Value below reference range        O2 Saturation, Arterial 93.6 %      Comment: 84 Value below reference range        Temperature 37.0 C      Barometric Pressure for Blood Gas 743 mmHg      Modality Nasal Cannula     Flow Rate 2.0 lpm      Ventilator Mode NA     Collected by [id number]     Comment: Meter: [id number]     :  [id number]           MORGAN Salas

## 2019-04-12 NOTE — PLAN OF CARE
Problem: Patient Care Overview  Goal: Plan of Care Review  Outcome: Ongoing (interventions implemented as appropriate)   04/12/19 7206   Plan of Care Review   Progress no change   OTHER   Outcome Summary OT eval completed. Pt lethargic with difficulty keeping eyes open initially. Orientedx3 but with conflicting stories throughout eval regarding PLOF/recent events. Adjusts socks in long sitting in bed, S with use of bed rail to come to EOB. FMC/GMC intact. Strength grossly 4/5 BUE. Pt stands CGA w use of RW and takes a couple steps toward HOB. C/o increased fatigue with minimal activity requiring seated rest. Too fatigued following MD assessments for continued assessment, returned to supine CGA. Pt c/o generalized pain throughout eval though has no obs functional implications d/t rate of pain. Demos decreased safety awareness and need for assist throughout requiring supervision for tasks. SKilled OT required to address ADL and functional mobilty to increase safety and independence. Recommend d/c home w 24/7 care and HH pending progress.   Coping/Psychosocial   Plan of Care Reviewed With patient

## 2019-04-13 VITALS
BODY MASS INDEX: 31.16 KG/M2 | DIASTOLIC BLOOD PRESSURE: 53 MMHG | RESPIRATION RATE: 16 BRPM | HEART RATE: 85 BPM | WEIGHT: 205.6 LBS | HEIGHT: 68 IN | SYSTOLIC BLOOD PRESSURE: 133 MMHG | TEMPERATURE: 97.8 F | OXYGEN SATURATION: 92 %

## 2019-04-13 LAB
ANION GAP SERPL CALCULATED.3IONS-SCNC: 5 MMOL/L (ref 4–13)
BACTERIA SPEC AEROBE CULT: NORMAL
BUN BLD-MCNC: 19 MG/DL (ref 5–21)
BUN/CREAT SERPL: 22.1 (ref 7–25)
CALCIUM SPEC-SCNC: 8.9 MG/DL (ref 8.4–10.4)
CHLORIDE SERPL-SCNC: 110 MMOL/L (ref 98–110)
CO2 SERPL-SCNC: 23 MMOL/L (ref 24–31)
CREAT BLD-MCNC: 0.86 MG/DL (ref 0.5–1.4)
DEPRECATED RDW RBC AUTO: 41.4 FL (ref 40–54)
ERYTHROCYTE [DISTWIDTH] IN BLOOD BY AUTOMATED COUNT: 13.4 % (ref 12–15)
GFR SERPL CREATININE-BSD FRML MDRD: 64 ML/MIN/1.73
GLUCOSE BLD-MCNC: 116 MG/DL (ref 70–100)
HCT VFR BLD AUTO: 24 % (ref 37–47)
HGB BLD-MCNC: 8.4 G/DL (ref 12–16)
MCH RBC QN AUTO: 30.1 PG (ref 28–32)
MCHC RBC AUTO-ENTMCNC: 35 G/DL (ref 33–36)
MCV RBC AUTO: 86 FL (ref 82–98)
PLATELET # BLD AUTO: 141 10*3/MM3 (ref 130–400)
PMV BLD AUTO: 10.7 FL (ref 6–12)
POTASSIUM BLD-SCNC: 4.1 MMOL/L (ref 3.5–5.3)
RBC # BLD AUTO: 2.79 10*6/MM3 (ref 4.2–5.4)
SODIUM BLD-SCNC: 138 MMOL/L (ref 135–145)
WBC NRBC COR # BLD: 5.3 10*3/MM3 (ref 4.8–10.8)

## 2019-04-13 PROCEDURE — 80048 BASIC METABOLIC PNL TOTAL CA: CPT | Performed by: INTERNAL MEDICINE

## 2019-04-13 PROCEDURE — 94760 N-INVAS EAR/PLS OXIMETRY 1: CPT

## 2019-04-13 PROCEDURE — 99233 SBSQ HOSP IP/OBS HIGH 50: CPT | Performed by: PSYCHIATRY & NEUROLOGY

## 2019-04-13 PROCEDURE — 85027 COMPLETE CBC AUTOMATED: CPT | Performed by: INTERNAL MEDICINE

## 2019-04-13 PROCEDURE — 94799 UNLISTED PULMONARY SVC/PX: CPT

## 2019-04-13 RX ORDER — CEFDINIR 300 MG/1
300 CAPSULE ORAL 2 TIMES DAILY
Qty: 8 CAPSULE | Refills: 0 | Status: SHIPPED | OUTPATIENT
Start: 2019-04-13 | End: 2019-05-16

## 2019-04-13 RX ADMIN — DILTIAZEM HYDROCHLORIDE 240 MG: 240 CAPSULE, EXTENDED RELEASE ORAL at 09:37

## 2019-04-13 RX ADMIN — CARBIDOPA AND LEVODOPA 1 TABLET: 25; 100 TABLET ORAL at 09:37

## 2019-04-13 RX ADMIN — POLYETHYLENE GLYCOL 3350 17 G: 17 POWDER, FOR SOLUTION ORAL at 09:38

## 2019-04-13 RX ADMIN — PANTOPRAZOLE SODIUM 40 MG: 40 TABLET, DELAYED RELEASE ORAL at 06:04

## 2019-04-13 RX ADMIN — CARVEDILOL 12.5 MG: 6.25 TABLET, FILM COATED ORAL at 09:36

## 2019-04-13 RX ADMIN — DESMOPRESSIN ACETATE 40 MG: 0.2 TABLET ORAL at 09:38

## 2019-04-13 RX ADMIN — SODIUM CHLORIDE, PRESERVATIVE FREE 3 ML: 5 INJECTION INTRAVENOUS at 09:39

## 2019-04-13 RX ADMIN — OXYCODONE HYDROCHLORIDE 15 MG: 5 TABLET ORAL at 06:07

## 2019-04-13 RX ADMIN — LEVOTHYROXINE SODIUM 50 MCG: 50 TABLET ORAL at 06:04

## 2019-04-13 RX ADMIN — DOCUSATE SODIUM 100 MG: 100 CAPSULE ORAL at 09:37

## 2019-04-13 RX ADMIN — LOSARTAN POTASSIUM 50 MG: 50 TABLET, FILM COATED ORAL at 09:38

## 2019-04-13 NOTE — PROGRESS NOTES
Case Management Discharge Note    Final Note: REFFERAL CALLED AND FAXED TO [facility], PER PT'S REQ AS SHE HAS USED THIS AGENCY IN THE PAST [phone].  ORDER FOR WALKER WAS SENT WITH PT. AS SHE WISHES TO  HERSELF.      Destination      No service has been selected for the patient.      Durable Medical Equipment      No service has been selected for the patient.      Dialysis/Infusion      No service has been selected for the patient.      Home Medical Care      No service has been selected for the patient.      Therapy      No service has been selected for the patient.      Community Resources      No service has been selected for the patient.             Final Discharge Disposition Code: 06 - home with home health care

## 2019-04-13 NOTE — DISCHARGE SUMMARY
[facility]  DISCHARGE SUMMARY       Date of Admission: 4/11/2019  Date of Discharge:  4/13/2019  Primary Care Physician: Olivia Mora APRN    Presenting Problem/History of Present Illness:  Altered mental status.     Final Discharge Diagnoses:  Active Hospital Problems    Diagnosis   • **Non-traumatic rhabdomyolysis   • Metabolic encephalopathy   • Acute renal failure superimposed on stage 3 chronic kidney disease (CMS/HCC)   • Acute respiratory failure with hypoxia and hypercapnia (CMS/HCC)   • SIRS (systemic inflammatory response syndrome) (CMS/HCC)   • Spinal stenosis, lumbar region, without neurogenic claudication   • Chronic constipation   • Chronic pain syndrome   • Chronic prescription opiate use   • Normocytic anemia   • Medical non-compliance, does not take narcotics as prescribed   • Hypothyroidism (acquired)   • Essential hypertension     Consults:   1. Dr. Miller with neurosurgery.   2. Dr. Brady with neurology.     Procedures Performed: None.     Pertinent Test Results:   Imaging Results (last 7 days)     Procedure Component Value Units Date/Time    MRI Lumbar Spine Without Contrast [[id number]] Collected:  04/11/19 2123     Updated:  04/11/19 2133    Narrative:       MRI LUMBAR SPINE WO CONTRAST- 4/11/2019 5:48 PM CDT     HISTORY: Low back pain, rapidly progressive neuro deficit;  M62.82-Rhabdomyolysis; N17.9-Acute kidney failure, unspecified;  N30.01-Acute cystitis with hematuria; A41.9-Sepsis, unspecified  organism; F99-Mental disorder, not otherwise specified     COMPARISON: None      TECHNIQUE: Multiplanar, multisequence MRI of the lumbar spine was  performed without the use of contrast.     FINDINGS:      Alignment: There are presumed to be 5 lumbar-type vertebrae with the  most inferior being labeled L5. There is a normal lumbar lordosis. Grade  1 anterolisthesis of L4 on L5 is present felt to represent subluxation  at the level  of the facets. Degenerative disc disease is noted  throughout the lumbar spine with relative sparing of the L3-L4 level.  There is narrowing of disc space height and loss of hydration on the  more heavily T2-weighted images..     Marrow signal: No pathologic marrow infiltrate is demonstrated. The  vertebral body heights and posterior elements are maintained.      Cord/Canal: The conus medullaris terminates at the level of L1. The  visualized spinal cord is normal in signal and morphology.      Soft tissues: The surrounding soft tissues are unremarkable.      Levels:      L1-L2: There is disc desiccation with mild bulging of the disc. Moderate  facet and mild ligamentous hypertrophy is present. There is no evidence  of central spinal stenosis. No neural foraminal narrowing is present..      L2-L3: There is disc desiccation with mild bulging of the disc. Moderate  facet and ligamentous hypertrophy is present. Broad-based foraminal and  lateral disc protrusions are present bilaterally. There is no evidence  of central spinal stenosis. The lateral disc protrusions combined with  the facet hypertrophy contribute to mild bilateral neural foraminal  narrowing..      L3-L4: There is mild bulging of the disc as well as moderate facet and  ligamentous hypertrophy. Lateral and foraminal protrusion of disc  material results in mild bilateral neural foraminal narrowing (left  greater than right). There is no central stenosis..      L4-L5: There is severe facet arthropathy at the L4-L5 level as well as  ligamentous hypertrophy. There is broad-based bulging of the disc. The  combination of findings results in severe central spinal stenosis with  marked effacement of the thecal sac. Moderate bilateral neural foraminal  narrowing is also present related to the broad-based bulging of the disc  and ligamentous hypertrophy..      L5-S1: Moderate to severe facet and ligamentous hypertrophy is noted at  this level. There is mild bulging  of the disc. No evidence of central  stenosis. The bulging the disc and ligamentous hypertrophy result in  moderate left-sided and mild right-sided foraminal narrowing..        Impression:       1. Degenerative disc disease with bulging of the disc is noted at  multiple levels. This combined with facet and ligamentous hypertrophy  contributes to central and foraminal stenosis as described above.  2. The most significant finding is at the L4-L5 level where there is  severe facet arthropathy and ligamentous hypertrophy. There is also  broad-based bulging of the disc resulting in central stenosis which is  severe in nature. Moderate bilateral neural foraminal narrowing is also  present. There is grade 1 anterolisthesis of L4 upon L5 secondary to  subluxation at the level of the facets..        This report was finalized on 04/11/2019 21:30 by Dr. Chi Hinds MD.    XR Abdomen KUB [[id number]] Collected:  04/11/19 1339     Updated:  04/11/19 1344    Narrative:       EXAMINATION:  XR ABDOMEN KUB-  4/11/2019 1:10 PM CDT     HISTORY: abdominal pain; M62.82-Rhabdomyolysis; N17.9-Acute kidney  failure, unspecified; N30.01-Acute cystitis with hematuria;  A41.9-Sepsis, unspecified organism; F99-Mental disorder, not otherwise  specified      COMPARISON: No comparison study.     TECHNIQUE: Single view, 2 images: KUB     FINDINGS:      There is a large amount of stool observed throughout the colon without  dilated bowel loops to indicate obstruction. Constipation suspected.     There are multiple calcific densities in the pelvis, likely phleboliths.     There is no organomegaly.     Degenerative changes noted in the thoracolumbar spine with osteophyte  formation.       Impression:       1. Suspect constipation. No dilated bowel loops indicate obstruction.  This report was finalized on 04/11/2019 13:40 by Dr. Carlos Peralta MD.    CT Head Without Contrast [[id number]] Collected:  04/11/19 1215     Updated:  04/11/19 1226     Narrative:       EXAMINATION:  CT HEAD WO CONTRAST-  4/11/2019 12:05 PM CDT     HISTORY: Altered mental status.     TECHNIQUE: Multiple axial images were obtained through the brain without  contrast infusion. Multiplanar images were reconstructed.     DLP: 762 mGy-cm. Automated dosage control was utilized.     COMPARISON: 02/26/2013.     FINDINGS: There are no hemorrhage, edema or mass effect. There is mild  atrophy with associated ventricular prominence. There is low density in  the white matter. Vascular calcification is noted. The visualized  paranasal sinuses and mastoid air cells are clear. No calvarial fracture  is seen.       Impression:       1. No hemorrhage, edema or mass effect.  2. Mild atrophy with associated ventricular prominence.  3. Low density in the white matter is nonspecific and likely due to  chronic small vessel. Vascular calcification is noted.     The full report of this exam was immediately signed and available to the  emergency room. The patient is currently in the emergency room.     This report was finalized on 04/11/2019 12:23 by Dr. Rickey Win MD.    XR Chest 1 View [[id number]] Collected:  04/11/19 1157     Updated:  04/11/19 1202    Narrative:       History:  78-year-old with confusion and shortness of breath.     Reference:  Chest radiograph October 2017.     Findings:  Frontal chest radiograph performed.     Normal heart size. The lungs are clear. No pleural fluid or  pneumothorax. No acute osseous abnormality. Degenerative changes of the  spine.          Impression:       No acute cardiopulmonary process.  This report was finalized on 04/11/2019 11:59 by Dr Partha Whitley, .        Lab Results (last 7 days)     Procedure Component Value Units Date/Time    Basic Metabolic Panel [[id number]]  (Abnormal) Collected:  04/13/19 0951    Specimen:  Blood Updated:  04/13/19 1027     Glucose 116 mg/dL      BUN 19 mg/dL      Creatinine 0.86 mg/dL      Sodium 138 mmol/L      Potassium 4.1 mmol/L       Chloride 110 mmol/L      CO2 23.0 mmol/L      Calcium 8.9 mg/dL      eGFR Non African Amer 64 mL/min/1.73      BUN/Creatinine Ratio 22.1     Anion Gap 5.0 mmol/L     Narrative:       GFR Normal >60  Chronic Kidney Disease <60  Kidney Failure <15    CBC (No Diff) [[id number]]  (Abnormal) Collected:  04/13/19 0951    Specimen:  Blood Updated:  04/13/19 1010     WBC 5.30 10*3/mm3      RBC 2.79 10*6/mm3      Hemoglobin 8.4 g/dL      Hematocrit 24.0 %      MCV 86.0 fL      MCH 30.1 pg      MCHC 35.0 g/dL      RDW 13.4 %      RDW-SD 41.4 fl      MPV 10.7 fL      Platelets 141 10*3/mm3     Urine Culture - Urine, Urine, Catheter In/Out [[id number]]  (Normal) Collected:  04/11/19 1058    Specimen:  Urine, Catheter In/Out Updated:  04/13/19 0706     Urine Culture No growth at 2 days    Blood Culture - Blood, Arm, Right [[id number]] Collected:  04/11/19 1057    Specimen:  Blood from Arm, Right Updated:  04/12/19 1130     Blood Culture No growth at 24 hours    Blood Culture - Blood, Arm, Left [[id number]] Collected:  04/11/19 1110    Specimen:  Blood from Arm, Left Updated:  04/12/19 1130     Blood Culture No growth at 24 hours    Hemoglobin A1c [[id number]] Collected:  04/12/19 0532    Specimen:  Blood Updated:  04/12/19 0641     Hemoglobin A1C 4.8 %     Narrative:       Less than 6.0           Non-Diabetic Range  6.0-7.0                 ADA Therapeutic Target  Greater than 7.0        Action Suggested    TSH [[id number]]  (Normal) Collected:  04/12/19 0532    Specimen:  Blood Updated:  04/12/19 0625     TSH 0.600 mIU/mL     Myoglobin, Serum [[id number]]  (Abnormal) Collected:  04/12/19 0532    Specimen:  Blood Updated:  04/12/19 0610     Myoglobin 474.1 ng/mL     Troponin [[id number]]  (Abnormal) Collected:  04/12/19 0532    Specimen:  Blood Updated:  04/12/19 0606     Troponin I 0.141 ng/mL     Lipid Panel [[id number]]  (Abnormal) Collected:  04/12/19 0532    Specimen:  Blood Updated:  04/12/19 0605     Total Cholesterol 148  mg/dL      Triglycerides 111 mg/dL      HDL Cholesterol 39 mg/dL      LDL Cholesterol  86 mg/dL      LDL/HDL Ratio 2.23    CK [[id number]]  (Abnormal) Collected:  04/12/19 0532    Specimen:  Blood Updated:  04/12/19 0555     Creatine Kinase 773 U/L     Comprehensive Metabolic Panel [[id number]]  (Abnormal) Collected:  04/12/19 0532    Specimen:  Blood Updated:  04/12/19 0555     Glucose 101 mg/dL      BUN 32 mg/dL      Creatinine 1.21 mg/dL      Sodium 138 mmol/L      Potassium 4.2 mmol/L      Chloride 109 mmol/L      CO2 22.0 mmol/L      Calcium 8.6 mg/dL      Total Protein 5.2 g/dL      Albumin 2.80 g/dL      ALT (SGPT) <15 U/L      AST (SGOT) 44 U/L      Alkaline Phosphatase 74 U/L      Total Bilirubin 0.5 mg/dL      eGFR Non African Amer 43 mL/min/1.73      Globulin 2.4 gm/dL      A/G Ratio 1.2 g/dL      BUN/Creatinine Ratio 26.4     Anion Gap 7.0 mmol/L     Narrative:       GFR Normal >60  Chronic Kidney Disease <60  Kidney Failure <15    CBC (No Diff) [[id number]]  (Abnormal) Collected:  04/12/19 0532    Specimen:  Blood Updated:  04/12/19 0544     WBC 5.53 10*3/mm3      RBC 3.05 10*6/mm3      Hemoglobin 9.0 g/dL      Hematocrit 26.7 %      MCV 87.5 fL      MCH 29.5 pg      MCHC 33.7 g/dL      RDW 13.5 %      RDW-SD 42.5 fl      MPV 10.7 fL      Platelets 141 10*3/mm3     Procalcitonin [[id number]]  (Abnormal) Collected:  04/11/19 1110    Specimen:  Blood Updated:  04/11/19 1229     Procalcitonin 0.45 ng/mL     Narrative:       SIRS, sepsis, severe sepsis, and septic shock are categorized according to the criteria of the consensus conference of the American College of Chest Physicians/Society of Critical Care Medicine.    PCT < 0.5 ng/mL     Systemic infection (sepsis) is not likely.    PCT >0.5 and < 2.0 ng/mL Systemic infection (sepsis) is possible, but other conditions are known to elevate PCT as well.    PCT > 2.0 ng/mL     Systemic infection (sepsis) is likely, unless other causes are known.      PCT > 10.0  ng/mL    Important systemic inflammatory response, almost exclusively due to severe bacterial sepsis or septic shock.    PCT values of < 0.5 ng/mL do not exclude an infection, because localized infections (without systemic signs) may be associated with such low concentrations, or a systemic infection in its initial stages (<6 hours).  Increased PCT can occur without infection.  PCT concentrations between 0.5 and 2.0 ng/mL should be interpreted taking into account the patients history.  It is recommended to retest PCT within 6-24 hours if any concentrations < 2.0 ng/mL are obtained.    Lactic Acid, Plasma [[id number]]  (Normal) Collected:  04/11/19 1110    Specimen:  Blood Updated:  04/11/19 1217     Lactate 0.9 mmol/L     Myoglobin, Serum [[id number]]  (Abnormal) Collected:  04/11/19 1110    Specimen:  Blood Updated:  04/11/19 1212     Myoglobin 3,402.0 ng/mL     TSH [[id number]]  (Normal) Collected:  04/11/19 1110    Specimen:  Blood Updated:  04/11/19 1208     TSH 0.936 mIU/mL     Urine Drug Screen - Urine, Catheter [[id number]]  (Abnormal) Collected:  04/11/19 1058    Specimen:  Urine, Catheter Updated:  04/11/19 1207     Amphetamine Screen, Urine Negative     Barbiturates Screen, Urine Negative     Benzodiazepine Screen, Urine Positive     Cocaine Screen, Urine Negative     Methadone Screen, Urine Negative     Opiate Screen Positive     Phencyclidine (PCP), Urine Negative     THC, Screen, Urine Negative    Narrative:       Negative Thresholds For Drugs Screened in Urine:    Amphetamines          500 ng/ml  Barbiturates          200 ng/ml  Benzodiazepines       200 ng/ml  Cocaine               150 ng/ml  Methadone             150 ng/ml  Opiates               300 ng/ml  Phencyclidine         25 ng/ml  THC                      50 ng/ml    The normal value for all drugs tested is negative. This report includes final unconfirmed screening results.  A positive result by this assay can be, at your request, sent to the  Reference Lab for confirmation by gas chromatography. Unconfirmed results must not be used for non-medical purposes, such as employment or legal testing. Clinical consideration should be applied to any drug of abuse test result, particularly when unconfirmed results are used.    T4, Free [[id number]]  (Normal) Collected:  04/11/19 1110    Specimen:  Blood Updated:  04/11/19 1154     Free T4 1.24 ng/dL     Urinalysis With Culture If Indicated - Urine, Catheter In/Out [[id number]]  (Abnormal) Collected:  04/11/19 1058    Specimen:  Urine, Catheter In/Out Updated:  04/11/19 1154     Color, UA Dark Yellow     Appearance, UA Clear     pH, UA <=5.0     Specific Gravity, UA 1.021     Glucose, UA Negative     Ketones, UA Negative     Bilirubin, UA Negative     Blood, UA Moderate (2+)     Protein, UA Trace     Leuk Esterase, UA Small (1+)     Nitrite, UA Negative     Urobilinogen, UA 0.2 E.U./dL    Urinalysis, Microscopic Only - Urine, Catheter In/Out [[id number]]  (Abnormal) Collected:  04/11/19 1058    Specimen:  Urine, Catheter In/Out Updated:  04/11/19 1154     RBC, UA 0-2 /HPF      WBC, UA 6-12 /HPF      Bacteria, UA None Seen /HPF      Squamous Epithelial Cells, UA 3-6 /HPF      Yeast, UA       Small/1+ Budding Yeast w/Hyphae     /HPF     Hyaline Casts, UA 0-2 /LPF      Fine Granular Casts, UA 0-2 /LPF      Methodology Manual Light Microscopy    BNP [[id number]]  (Normal) Collected:  04/11/19 1110    Specimen:  Blood Updated:  04/11/19 1148     proBNP 1,660.0 pg/mL     Troponin [[id number]]  (Abnormal) Collected:  04/11/19 1110    Specimen:  Blood Updated:  04/11/19 1148     Troponin I 0.131 ng/mL     Influenza Antigen, Rapid - Swab, Nasopharynx [[id number]]  (Normal) Collected:  04/11/19 1107    Specimen:  Swab from Nasopharynx Updated:  04/11/19 1141     Influenza A Ag, EIA Negative     Influenza B Ag, EIA Negative    Narrative:       Recommend confirmation of negative results by viral culture or molecular assay.     Ammonia [[id number]]  (Abnormal) Collected:  04/11/19 1110    Specimen:  Blood Updated:  04/11/19 1137     Ammonia <9 umol/L     Lipase [[id number]]  (Normal) Collected:  04/11/19 1110    Specimen:  Blood Updated:  04/11/19 1136     Lipase 33 U/L     Comprehensive Metabolic Panel [[id number]]  (Abnormal) Collected:  04/11/19 1110    Specimen:  Blood Updated:  04/11/19 1136     Glucose 107 mg/dL      BUN 45 mg/dL      Creatinine 2.26 mg/dL      Sodium 136 mmol/L      Potassium 4.5 mmol/L      Chloride 101 mmol/L      CO2 24.0 mmol/L      Calcium 9.3 mg/dL      Total Protein 6.7 g/dL      Albumin 4.00 g/dL      ALT (SGPT) 17 U/L      AST (SGOT) 49 U/L      Alkaline Phosphatase 100 U/L      Total Bilirubin 0.7 mg/dL      eGFR Non African Amer 21 mL/min/1.73      Globulin 2.7 gm/dL      A/G Ratio 1.5 g/dL      BUN/Creatinine Ratio 19.9     Anion Gap 11.0 mmol/L     Narrative:       GFR Normal >60  Chronic Kidney Disease <60  Kidney Failure <15    Ethanol [[id number]]  (Normal) Collected:  04/11/19 1110    Specimen:  Blood Updated:  04/11/19 1136     Ethanol % <0.010 %     Narrative:       Not for legal purposes. Chain of Custody not followed.     CK [[id number]]  (Abnormal) Collected:  04/11/19 1110    Specimen:  Blood Updated:  04/11/19 1136     Creatine Kinase 1,295 U/L     Magnesium [[id number]]  (Abnormal) Collected:  04/11/19 1110    Specimen:  Blood Updated:  04/11/19 1136     Magnesium 2.3 mg/dL     D-dimer, Quantitative [[id number]]  (Abnormal) Collected:  04/11/19 1110    Specimen:  Blood Updated:  04/11/19 1136     D-Dimer, Quantitative 0.75 mg/L (FEU)     Narrative:       Reference Range is 0-0.50 mg/L FEU. However, results <0.50 mg/L FEU tends to rule out DVT or PE. Results >0.50 mg/L FEU are not useful in predicting absence or presence of DVT or PE.    CBC & Differential [[id number]] Collected:  04/11/19 1110    Specimen:  Blood Updated:  04/11/19 1126    Narrative:       The following orders were created for panel  order CBC & Differential.  Procedure                               Abnormality         Status                     ---------                               -----------         ------                     CBC Auto Differential[[id number]]        Abnormal            Final result                 Please view results for these tests on the individual orders.    CBC Auto Differential [[id number]]  (Abnormal) Collected:  04/11/19 1110    Specimen:  Blood Updated:  04/11/19 1126     WBC 9.34 10*3/mm3      RBC 3.67 10*6/mm3      Hemoglobin 10.6 g/dL      Hematocrit 32.2 %      MCV 87.7 fL      MCH 28.9 pg      MCHC 32.9 g/dL      RDW 13.5 %      RDW-SD 43.0 fl      MPV 10.6 fL      Platelets 178 10*3/mm3      Neutrophil % 75.3 %      Lymphocyte % 12.8 %      Monocyte % 10.0 %      Eosinophil % 0.7 %      Basophil % 0.2 %      Immature Grans % 1.0 %      Neutrophils, Absolute 7.03 10*3/mm3      Lymphocytes, Absolute 1.20 10*3/mm3      Monocytes, Absolute 0.93 10*3/mm3      Eosinophils, Absolute 0.07 10*3/mm3      Basophils, Absolute 0.02 10*3/mm3      Immature Grans, Absolute 0.09 10*3/mm3      nRBC 0.0 /100 WBC     Blood Gas, Arterial [[id number]]  (Abnormal) Collected:  04/11/19 1055    Specimen:  Arterial Blood Updated:  04/11/19 1103     Site Right Radial     Donny's Test Positive     pH, Arterial 7.258 pH units      Comment: 84 Value below reference range        pCO2, Arterial 49.9 mm Hg      Comment: 83 Value above reference range        pO2, Arterial 66.3 mm Hg      Comment: 84 Value below reference range        HCO3, Arterial 22.3 mmol/L      Base Excess, Arterial -4.9 mmol/L      Comment: 84 Value below reference range        O2 Saturation, Arterial 93.6 %      Comment: 84 Value below reference range        Temperature 37.0 C      Barometric Pressure for Blood Gas 743 mmHg      Modality Nasal Cannula     Flow Rate 2.0 lpm      Ventilator Mode NA     Collected by [id number]     Comment: Meter: [id number]     :   [id number]           Hospital Course:  The patient is a 78 y.o. female who presented to [facility] with altered mental status, difficulty walking, and significant weakness.  She presented with nontraumatic rhabdomyolysis secondary to inactivity and not moving from the same position for quite some time.  No falls were described. See HPI from H&P for further details.      CPK improved.  Troponin trend flat.  Stop checking.  Renal insufficiency has resolved with IV fluids.     The patient was recently treated for urinary tract infection outpatient.  Her urine could have been sterilized.  However, her urinalysis is not impressive at this point in time. Urine culture showed no growth.  Rocephin was given here and she can finish Omnicef at discharge.      Neurology consultation to evaluate her \"spells.\"  I'm more concerned that the patient has problems with polypharmacy and does not take her opiate pain medications as prescribed. Dr. Brady recommends outpatient neurology followup with outpatient EEG.      Neurosurgery consultation was sought to evaluate her severe lumbar spinal stenosis.  I discussed the case with Dr. Miller.  He felt that she would be an acceptable candidate for a procedure.  However, the patient adamantly refuses any surgical intervention.     Bowel regimen given.     Hemoglobin A1c normal at 4.8.  TSH appropriately suppressed at 0.60.     Reconciled and restarted the bulk of her home medications.     Physical and occupational therapy evaluated. Occupational therapy recommended home with family assistance and home health.  Physical therapy recommended home with home health versus skilled nursing.  The patient refuses skilled nursing placement.  Home health is being set up.  She and her family have also requested a rolling walker with a seat.    Physical Exam on Discharge:  /53 (BP Location: Right arm)   Pulse 85   Temp 97.8 °F (36.6 °C) (Oral)   Resp 16   Ht 172.5 cm (67.91\")   " Wt 93.3 kg (205 lb 9.6 oz)   SpO2 92%   Breastfeeding? No   BMI 31.34 kg/m²   Physical Exam  Constitutional: She is oriented to person, place, and time. She appears well-developed and well-nourished.   Up in bed.  No distress. Seen and discussed with her  and daughter. Discussed with her nurse, Karina.    Head: Normocephalic and atraumatic.   Eyes: Conjunctivae and EOM are normal. Pupils are equal, round, and reactive to light.   Neck: Neck supple. No JVD present.   Cardiovascular: Normal rate, regular rhythm, normal heart sounds and intact distal pulses. Exam reveals no gallop and no friction rub. No murmur heard.  Pulmonary/Chest: Effort normal and breath sounds normal. No respiratory distress. She has no wheezes. She has no rales. She exhibits no tenderness.   Abdominal: Soft. Bowel sounds are normal. She exhibits no distension. There is no tenderness. There is no rebound and no guarding.   Musculoskeletal: Normal range of motion. She exhibits edema (trace). She exhibits no tenderness or deformity.   Neurological: She is alert and oriented to person, place, and time. She displays normal reflexes. No cranial nerve deficit. She exhibits normal muscle tone.   Generally weak, nonfocal.   Skin: Skin is warm and dry. No rash noted.   Psychiatric: Flat.  Does not seem confused.     Condition on Discharge: Good.     Discharge Disposition:  Home or Self Care    Discharge Medications:     Discharge Medications      New Medications      Instructions Start Date   cefdinir 300 MG capsule  Commonly known as:  OMNICEF   300 mg, Oral, 2 Times Daily         Continue These Medications      Instructions Start Date   atorvastatin 40 MG tablet  Commonly known as:  LIPITOR   40 mg, Oral, Daily      carbidopa-levodopa  MG per tablet  Commonly known as:  SINEMET   1 tablet, Oral, 4 Times Daily      carvedilol 12.5 MG tablet  Commonly known as:  COREG   12.5 mg, Oral, 2 Times Daily With Meals      celecoxib 100 MG  capsule  Commonly known as:  CeleBREX   100 mg, Oral, Take As Directed, Take 1 capsule 3 to 4 times weekly as needed for inflammation.       cyclobenzaprine 10 MG tablet  Commonly known as:  FLEXERIL   10 mg, Oral, 2 Times Daily PRN      diltiazem  MG 24 hr capsule  Commonly known as:  DILACOR XR   240 mg, Oral, Nightly      irbesartan 150 MG tablet  Commonly known as:  AVAPRO   150 mg, Oral, Daily      lansoprazole 30 MG capsule  Commonly known as:  PREVACID   30 mg, Oral, Daily      levothyroxine 50 MCG tablet  Commonly known as:  SYNTHROID, LEVOTHROID   50 mcg, Oral, Every Morning      oxyCODONE HCl 15 MG tablet    15 mg, Oral, 4 Times Daily PRN      SUMAtriptan 50 MG tablet  Commonly known as:  IMITREX   50 mg, Oral, 2 Times Daily PRN           Discharge Diet:   Diet Instructions     Diet: Regular; Thin      Discharge Diet:  Regular    Fluid Consistency:  Thin        Activity at Discharge:   Activity Instructions     Activity as Tolerated          Follow-up Appointments:   1. MORGAN Claros in 1 week.   2. Neurology clinic as recommended with outpatient EEG ordered by Dr. Brady.     Future Appointments   Date Time Provider Department Center   7/16/2019 10:30 AM Shasta Madrigal MD [facility] None     Test Results Pending at Discharge: None.     Angel Hay DO  04/13/19  10:28 AM    Time: 35 minutes.

## 2019-04-13 NOTE — PROGRESS NOTES
Neurology Progress Note      Chief Complaint:  confusion    Subjective     Subjective:    Mental status improved this morning.    Medications:  Current Facility-Administered Medications   Medication Dose Route Frequency Provider Last Rate Last Dose   • acetaminophen (TYLENOL) tablet 650 mg  650 mg Oral Q4H PRN Kierra Schroeder APRN   650 mg at 04/12/19 0222   • atorvastatin (LIPITOR) tablet 40 mg  40 mg Oral Daily Kierra Schroeder, APRN   40 mg at 04/13/19 0938   • carbidopa-levodopa (SINEMET)  MG per tablet 1 tablet  1 tablet Oral 4x Daily Kierra Schroeder APRN   1 tablet at 04/13/19 0937   • carvedilol (COREG) tablet 12.5 mg  12.5 mg Oral BID With Meals Kierra Schroeder APRN   12.5 mg at 04/13/19 0936   • cefTRIAXone (ROCEPHIN) 1 g/100 mL 0.9% NS (MBP)  1 g Intravenous Q24H Kierra Schroeder APRN   1 g at 04/12/19 1510   • cyclobenzaprine (FLEXERIL) tablet 10 mg  10 mg Oral BID PRN Kierra Schroeder APRN       • diltiaZEM CD (CARDIZEM CD) 24 hr capsule 240 mg  240 mg Oral Q24H Kierra Schroeder APRN   240 mg at 04/13/19 0937   • docusate sodium (COLACE) capsule 100 mg  100 mg Oral BID Angel Hay DO   100 mg at 04/13/19 0937   • levothyroxine (SYNTHROID, LEVOTHROID) tablet 50 mcg  50 mcg Oral QAM Kierra Schroeder APRN   50 mcg at 04/13/19 0604   • losartan (COZAAR) tablet 50 mg  50 mg Oral Q24H Kierra Schroeder APRN   50 mg at 04/13/19 0938   • ondansetron (ZOFRAN) tablet 4 mg  4 mg Oral Q6H PRN Kierra Schroeder APRN        Or   • ondansetron (ZOFRAN) injection 4 mg  4 mg Intravenous Q6H PRN Kierra Schroeder, APRN       • oxyCODONE (ROXICODONE) immediate release tablet 15 mg  15 mg Oral Q6H PRN Kierra Schroeder APRN   15 mg at 04/13/19 0607   • pantoprazole (PROTONIX) EC tablet 40 mg  40 mg Oral Kierra Dupree APRN   40 mg at 04/13/19 0604   • polyethylene glycol 3350 powder (packet)  17 g Oral Daily Angel Hay DO   17 g at 04/13/19 0938   • sodium chloride 0.9 % flush  10 mL  10 mL Intravenous PRN Han Dutta,        • sodium chloride 0.9 % flush 3 mL  3 mL Intravenous Q12H Kierra Schroeder APRN   3 mL at 04/13/19 0939   • sodium chloride 0.9 % flush 3-10 mL  3-10 mL Intravenous PRN Kierra Schroeder APRN       • sodium chloride 0.9 % infusion  75 mL/hr Intravenous Continuous Angel Hay DO 75 mL/hr at 04/12/19 2325 75 mL/hr at 04/12/19 2325       Review of Systems:   -A 14 point review of systems is completed and is negative except for confusion      Objective      Vital Signs  Temp:  [97.8 °F (36.6 °C)-98.3 °F (36.8 °C)] 97.8 °F (36.6 °C)  Heart Rate:  [73-85] 85  Resp:  [16-20] 16  BP: (128-138)/(48-57) 133/53    Physical Exam:      Neurologic Exam:     Mental Status:    -Awake, Alert, Oriented X 3  -No word finding difficulties  -No aphasia  -No dysarthria  -Follows simple and complex commands     CN II:  Visual fields full.  Pupils equally reactive to light  CN III, IV, VI:  Extraocular Muscles full with no signs of nystagmus  CN V:  Facial sensory is symmetric with no asymmetries.  CN VII:  Facial motor symmetric  CN VIII:  Gross hearing intact bilaterally  CN IX:  Palate elevates symmetrically  CN X:  Palate elevates symmetrically  CN XI:  Shoulder shrug symmetric  CN XII:  Tongue is midline on protrusion     Motor:      Upper extremities are full strength both proximally and distally  Lower extremities have full strength on hip flexion and extension bilaterally.  Knee flexion and extension are full strength as well.  Dorsiflexion on the left foot is 4-5 and on the right is 5-.  Plantar flexion is full strength in both extremities     DTR:  Reflexes are 1+ in the upper extremities and somewhat blunted in the knees and ankles.     Sensory:  -Intact to light touch, pinprick, temperature, pain, and proprioception     Coordination:  -Finger to nose intact  -Heel to shin intact  -No ataxia     Gait  Requires a rolling walker         Results Review:    I reviewed  the patient's new clinical results.    Results from last 7 days   Lab Units 04/13/19  0951 04/12/19  0532 04/11/19  1110   WBC 10*3/mm3 5.30 5.53 9.34   HEMOGLOBIN g/dL 8.4* 9.0* 10.6*   HEMATOCRIT % 24.0* 26.7* 32.2*   PLATELETS 10*3/mm3 141 141 178        Results from last 7 days   Lab Units 04/13/19  0951 04/12/19  0532 04/11/19  1110   SODIUM mmol/L 138 138 136   POTASSIUM mmol/L 4.1 4.2 4.5   CHLORIDE mmol/L 110 109 101   CO2 mmol/L 23.0* 22.0* 24.0   BUN mg/dL 19 32* 45*   CREATININE mg/dL 0.86 1.21 2.26*   CALCIUM mg/dL 8.9 8.6 9.3   BILIRUBIN mg/dL  --  0.5 0.7   ALK PHOS U/L  --  74 100   ALT (SGPT) U/L  --  <15 17   AST (SGOT) U/L  --  44 49*   GLUCOSE mg/dL 116* 101* 107*        Lab Results   Component Value Date    MG 2.3 (H) 04/11/2019    PROTIME 12.3 05/02/2017    INR 1.0 05/02/2017     No components found for: POCGLUC  No components found for: A1C  Lab Results   Component Value Date    HDL 39 (L) 04/12/2019    LDL 86 04/12/2019     No components found for: B12  Lab Results   Component Value Date    TSH 0.600 04/12/2019     MRI of lumbar spine reviewed by me.  There is significant spinal stenosis at the L4-L5 level.  There is central canal stenosis at this level to the point where CSF is obliterated on both sides.           Assessment/Plan     Hospital Problem List      Non-traumatic rhabdomyolysis    Spinal stenosis, lumbar region, without neurogenic claudication    Metabolic encephalopathy    Acute renal failure superimposed on stage 3 chronic kidney disease (CMS/HCC)    Acute respiratory failure with hypoxia and hypercapnia (CMS/HCC)    Medical non-compliance, does not take narcotics as prescribed    SIRS (systemic inflammatory response syndrome) (CMS/HCC)    Chronic constipation    Chronic pain syndrome    Chronic prescription opiate use    Normocytic anemia    Hypothyroidism (acquired)    Essential hypertension    Impression:  1.  L4-L5 lumbar stenoses  2.  History of Sinemet usage for control of  neuropathic symptoms  3.  Episodes of alteration of consciousness likely secondary to pain medications or could be purposeful secondary to patient description  4.  History of uterine cancer with chronic pain  5.  Urine drug screen positive for opiates and benzodiazepines even though the patient has no prescription for benzodiazepines    Plan:  · Recommend EEG to rule out epileptic causes of alteration of consciousness; however, I believe this would be a low likelihood as a cause of her alteration of consciousness  · Will arrange as outpatient per patient request  · No findings on neurologic exam concerning for a central CNS lesion and therefore MRI brain not recommended at this time  · Recommend decreasing medications as much is possible that her mind altering  · The patient does not wish to pursue surgical options of lumbar stenoses.  Recommend continuing pain management with a focus of decreasing use of mind altering medications as much as possible  · I have recommended that she follow up with Dr. Michele as an outpatient at least to consider further surgical correction given she has a very focal lesion at L4-L5  · The patient wishes to leave the hospital as soon as possible.  I have agreed to arrange the EEG as an outpatient.  She will follow-up in the neurology clinic in 4-6 weeks.  At that time it may also be reasonable to arrange a neurologic exam in the office date examination with a Sinemet vacation for at least 8 hours prior to examination.  This will help better define the role of Sinemet and what it is benefiting her.        Salbador Brady MD  04/13/19  10:57 AM

## 2019-04-13 NOTE — PLAN OF CARE
Problem: Fall Risk (Adult)  Goal: Identify Related Risk Factors and Signs and Symptoms  Outcome: Ongoing (interventions implemented as appropriate)   04/13/19 0439   Fall Risk (Adult)   Related Risk Factors (Fall Risk) age-related changes;fatigue/slow reaction;gait/mobility problems;environment unfamiliar   Signs and Symptoms (Fall Risk) presence of risk factors     Goal: Absence of Fall  Outcome: Ongoing (interventions implemented as appropriate)    Goal: Absence of Fall  Outcome: Ongoing (interventions implemented as appropriate)   04/13/19 0439   Fall Risk (Adult)   Absence of Fall making progress toward outcome       Problem: Patient Care Overview  Goal: Plan of Care Review  Outcome: Ongoing (interventions implemented as appropriate)   04/13/19 0439   Plan of Care Review   Progress no change   OTHER   Outcome Summary VSS. Continues to c/o pain. Pain meds given as ordered. Labs continue to improve. Intermittent confusion continues. Safety maintained. Continue to monitor.   Coping/Psychosocial   Plan of Care Reviewed With patient     Goal: Individualization and Mutuality  Outcome: Ongoing (interventions implemented as appropriate)   04/13/19 0439   Individualization   Patient Specific Goals (Include Timeframe) Labs improved.   Patient Specific Interventions Continue to monitor labs.     Goal: Interprofessional Rounds/Family Conf  Outcome: Ongoing (interventions implemented as appropriate)   04/13/19 0439   Interdisciplinary Rounds/Family Conf   Participants nursing;patient       Problem: Renal Failure/Kidney Injury, Acute (Adult)  Goal: Signs and Symptoms of Listed Potential Problems Will be Absent, Minimized or Managed (Renal Failure/Kidney Injury, Acute)  Outcome: Ongoing (interventions implemented as appropriate)   04/13/19 0439   Goal/Outcome Evaluation   Problems Assessed (Acute Renal Failure/Kidney Injury) all   Problems Present (ARF/Kidney Injury) hypertension;situational response;fluid imbalance        Problem: Confusion, Acute (Adult)  Goal: Identify Related Risk Factors and Signs and Symptoms  Outcome: Ongoing (interventions implemented as appropriate)   04/13/19 0439   Confusion, Acute (Adult)   Related Risk Factors (Acute Confusion) mobility decreased;pain   Signs and Symptoms (Acute Confusion) memory disturbed     Goal: Cognitive/Functional Impairments Minimized  Outcome: Ongoing (interventions implemented as appropriate)   04/13/19 0439   Confusion, Acute (Adult)   Cognitive/Functional Impairments Minimized making progress toward outcome     Goal: Safety  Outcome: Ongoing (interventions implemented as appropriate)   04/13/19 0439   Confusion, Acute (Adult)   Safety making progress toward outcome       Problem: Pain, Chronic (Adult)  Goal: Identify Related Risk Factors and Signs and Symptoms  Outcome: Ongoing (interventions implemented as appropriate)   04/13/19 0439   Pain, Chronic (Adult)   Signs and Symptoms (Chronic Pain) verbalization of pain descriptors     Goal: Acceptable Pain/Comfort Level and Functional Ability  Outcome: Ongoing (interventions implemented as appropriate)   04/13/19 0439   Pain, Chronic (Adult)   Acceptable Pain/Comfort Level and Functional Ability making progress toward outcome

## 2019-04-14 ENCOUNTER — READMISSION MANAGEMENT (OUTPATIENT)
Dept: CALL CENTER | Facility: HOSPITAL | Age: 79
End: 2019-04-14

## 2019-04-14 NOTE — OUTREACH NOTE
Prep Survey      Responses   Facility patient discharged from?  [place]   Is patient eligible?  Yes   Discharge diagnosis  Non-traumatic rhabdomyolysis   Does the patient have one of the following disease processes/diagnoses(primary or secondary)?  Other   Does the patient have Home health ordered?  Yes   What is the Home health agency?   [facility]   Is there a DME ordered?  Yes   What DME was ordered?  WALKER   Comments regarding appointments  see AVS   Prep survey completed?  Yes          Vanessa Cardenas RN

## 2019-04-14 NOTE — THERAPY DISCHARGE NOTE
Acute Care - Occupational Therapy Discharge Summary  [facility]     Patient Name: Jennifer Gomes  : 1940  MRN: [id number]    Today's Date: 2019  Onset of Illness/Injury or Date of Surgery: 19    Date of Referral to OT: 19  Referring Physician: MORGAN Cunningham      Admit Date: 2019        OT Recommendation and Plan    Visit Dx:    ICD-10-CM ICD-9-CM   1. Non-traumatic rhabdomyolysis M62.82 728.88   2. Acute kidney injury (CMS/[facility]) N17.9 584.9   3. Acute cystitis with hematuria N30.01 595.0   4. Sepsis, due to unspecified organism (CMS/[facility]) A41.9 038.9     995.91   5. Confusion and disorientation F99 300.9   6. Metabolic encephalopathy G93.41 348.31   7. Impaired functional mobility, balance, gait, and endurance Z74.09 V49.89   8. Impaired mobility and ADLs Z74.09 799.89   9. Spinal stenosis, lumbar region, without neurogenic claudication M48.061 724.02               Rehab Goal Summary     Row Name 19 0742 19 0711          Bed Mobility Goal 1 (PT)    Activity/Assistive Device (Bed Mobility Goal 1, PT)  --  bed mobility activities, all  -MF     [place]/Cues Needed (Bed Mobility Goal 1, PT)  --  conditional independence  -MF     Time Frame (Bed Mobility Goal 1, PT)  --  long term goal (LTG);10 days  -MF     Barriers (Bed Mobility Goal 1, PT)  --  motor planning deficit  -MF     Progress/Outcomes (Bed Mobility Goal 1, PT)  --  goal not met  -        Transfer Goal 1 (PT)    Activity/Assistive Device (Transfer Goal 1, PT)  --  transfers, all  -MF     [place] Level/Cues Needed (Transfer Goal 1, PT)  --  minimum assist (75% or more patient effort);1 person assist;contact guard assist  -MF     Time Frame (Transfer Goal 1, PT)  --  long term goal (LTG);10 days  -MF     Barriers (Transfers Goal 1, PT)  --  fwd flexed posture  -MF     Progress/Outcome (Transfer Goal 1, PT)  --  goal not met  -        Gait Training Goal 1 (PT)    Activity/Assistive Device (Gait  Training Goal 1, PT)  --  gait (walking locomotion);decrease fall risk;diminish gait deviation;improve balance and speed;increase endurance/gait distance;increase energy conservation;walker, rolling  -     [place] Level (Gait Training Goal 1, PT)  --  minimum assist (75% or more patient effort);1 person assist  -MF     Distance (Gait Goal 1, PT)  --  ~35-45 feet distance to walk to BR  -MF     Time Frame (Gait Training Goal 1, PT)  --  long term goal (LTG);10 days  -MF     Barriers (Gait Training Goal 1, PT)  --  fwd flexed posture  -MF     Progress/Outcome (Gait Training Goal 1, PT)  --  goal not met  -        Balance Goal 1 (PT)    Activity/Assistive Device (Balance Goal 1, PT)  --  standing, static;standing, dynamic  -     [place] Level/Cues Needed (Balance Goal 1, PT)  --  minimum assist (75% or more patient effort);contact guard assist;1 person assist  -MF     Time Frame (Balance Goal 1, PT)  --  long term goal (LTG);10 days  -MF     Barriers (Balance Goal 1, PT)  --  fwd flexed posture  -     Progress/Outcomes (Balance Goal 1, PT)  --  goal not met  -        Occupational Therapy Goals    Transfer Goal Selection (OT)  transfer, OT goal 1  -TR  --     Balance Goal Selection (OT)  balance, OT goal 1  -TR  --     Safety Awareness Goal Selection (OT)  safety awareness, OT goal 1  -TR  --        Transfer Goal 1 (OT)    Activity/Assistive Device (Transfer Goal 1, OT)  sit-to-stand/stand-to-sit;bed-to-chair/chair-to-bed;toilet;tub  -TR  --     [place] Level/Cues Needed (Transfer Goal 1, OT)  standby assist  -TR  --     Time Frame (Transfer Goal 1, OT)  long term goal (LTG);by discharge  -TR  --     Progress/Outcome (Transfer Goal 1, OT)  goal not met;discharged from facility  -TR  --        Balance Goal 1 (OT)    Activity/Assistive Device (Balance Goal 1, OT)  standing, dynamic in context of ADL/functional reaching task  -TR  --     [place] Level/Cues Needed (Balance Goal 1, OT)  standby  assist  -TR  --     Time Frame (Balance Goal 1, OT)  long term goal (LTG);by discharge  -TR  --     Progress/Outcomes (Balance Goal 1, OT)  goal not met;discharged from facility  -TR  --        Safety Awareness Goal 1 (OT)    Activity (Safety Awareness Goal 1, OT)  insight into deficits/self awareness;safe use of assistive device/equipment;follow through of safety precautions;demonstration of safe behaviors;demonstrates understanding  -TR  --     [place]/Cues/Accuracy (Safety Awareness Goal 1, OT)  with 90% accuracy  -TR  --     Time Frame (Safety Awareness Goal 1, OT)  long term goal (LTG);by discharge  -TR  --     Progress/Outcome (Safety Awareness Goal 1, OT)  goal not met;discharged from facility  -TR  --       User Key  (r) = Recorded By, (t) = Taken By, (c) = Cosigned By    Initials Name Provider Type Discipline    Mraisol Juarez, PTA Physical Therapy Assistant PT    Lisa Mccarthy, OTR/L Occupational Therapist OT          Outcome Measures     Row Name 04/12/19 1500 04/12/19 1400          How much help from another person do you currently need...    Turning from your back to your side while in flat bed without using bedrails?  --  3  -MATTY (r) GR (t) MATTY (c)     Moving from lying on back to sitting on the side of a flat bed without bedrails?  --  2  -MATTY (r) GR (t) MATTY (c)     Moving to and from a bed to a chair (including a wheelchair)?  --  2  -MATTY (r) GR (t) MATTY (c)     Standing up from a chair using your arms (e.g., wheelchair, bedside chair)?  --  2  -MATTY (r) GR (t) MATTY (c)     Climbing 3-5 steps with a railing?  --  2  -MATTY (r) GR (t) MATTY (c)     To walk in hospital room?  --  2  -MATTY (r) GR (t) MATTY (c)     AM-PAC 6 Clicks Score  --  13  -MATTY (r) GR (t)        How much help from another is currently needed...    Putting on and taking off regular lower body clothing?  3  -MW  --     Bathing (including washing, rinsing, and drying)  3  -MW  --     Toileting (which includes using toilet bed pan or  urinal)  3  -MW  --     Putting on and taking off regular upper body clothing  3  -MW  --     Taking care of personal grooming (such as brushing teeth)  4  -MW  --     Eating meals  4  -MW  --     Score  20  -MW  --        Functional Assessment    Outcome Measure Options  AM-PAC 6 Clicks Daily Activity (OT)  -MW  AM-PAC 6 Clicks Basic Mobility (PT)  -MATTY (r) JARRED (t) MATTY (c)       User Key  (r) = Recorded By, (t) = Taken By, (c) = Cosigned By    Initials Name Provider Type    Arturo Nina, PT DPT Physical Therapist    María Wong, OTR/L Occupational Therapist    Jana Ramos, HUNTER Student PT Student          Therapy Suggested Charges     Code   Minutes Charges    None                 OT Discharge Summary  Anticipated Discharge Disposition (OT): home with home health  Reason for Discharge: Discharge from facility  Outcomes Achieved: Refer to plan of care for updates on goals achieved  Discharge Destination: Home with home health      Lisa Sanchez, OTR/L  4/14/2019

## 2019-04-14 NOTE — THERAPY DISCHARGE NOTE
Acute Care - Physical Therapy Discharge Summary  [facility]       Patient Name: Jennifer Gomes  : 1940  MRN: [id number]    Today's Date: 2019  Onset of Illness/Injury or Date of Surgery: 19    Date of Referral to PT: 19  Referring Physician: MORGAN Cunningham      Admit Date: 2019      PT Recommendation and Plan    Visit Dx:    ICD-10-CM ICD-9-CM   1. Non-traumatic rhabdomyolysis M62.82 728.88   2. Acute kidney injury (CMS/[facility]) N17.9 584.9   3. Acute cystitis with hematuria N30.01 595.0   4. Sepsis, due to unspecified organism (CMS/[facility]) A41.9 038.9     995.91   5. Confusion and disorientation F99 300.9   6. Metabolic encephalopathy G93.41 348.31   7. Impaired functional mobility, balance, gait, and endurance Z74.09 V49.89   8. Impaired mobility and ADLs Z74.09 799.89   9. Spinal stenosis, lumbar region, without neurogenic claudication M48.061 724.02       Outcome Measures     Row Name 19 1500 19 1400          How much help from another person do you currently need...    Turning from your back to your side while in flat bed without using bedrails?  --  3  -MATTY (r) GR (t) MATTY (c)     Moving from lying on back to sitting on the side of a flat bed without bedrails?  --  2  -MATTY (r) GR (t) MATTY (c)     Moving to and from a bed to a chair (including a wheelchair)?  --  2  -MATTY (r) GR (t) MATTY (c)     Standing up from a chair using your arms (e.g., wheelchair, bedside chair)?  --  2  -MATTY (r) GR (t) MATTY (c)     Climbing 3-5 steps with a railing?  --  2  -MATTY (r) GR (t) MATTY (c)     To walk in hospital room?  --  2  -MATTY (r) GR (t) MATTY (c)     AM-PAC 6 Clicks Score  --  13  -MATTY (r) GR (t)        How much help from another is currently needed...    Putting on and taking off regular lower body clothing?  3  -MW  --     Bathing (including washing, rinsing, and drying)  3  -MW  --     Toileting (which includes using toilet bed pan or urinal)  3  -MW  --     Putting on and taking off regular upper  body clothing  3  -MW  --     Taking care of personal grooming (such as brushing teeth)  4  -MW  --     Eating meals  4  -MW  --     Score  20  -MW  --        Functional Assessment    Outcome Measure Options  AM-PAC 6 Clicks Daily Activity (OT)  -MW  AM-PAC 6 Clicks Basic Mobility (PT)  -MATTY (r) JARRED (t) MATTY (c)       User Key  (r) = Recorded By, (t) = Taken By, (c) = Cosigned By    Initials Name Provider Type    Arturo Nina, PT DPT Physical Therapist    María Wong, OTR/L Occupational Therapist    Jana Ramos, PT Student PT Student              Rehab Goal Summary     Row Name 04/14/19 0711             Bed Mobility Goal 1 (PT)    Activity/Assistive Device (Bed Mobility Goal 1, PT)  bed mobility activities, all  -MF      [place] Level/Cues Needed (Bed Mobility Goal 1, PT)  conditional independence  -MF      Time Frame (Bed Mobility Goal 1, PT)  long term goal (LTG);10 days  -MF      Barriers (Bed Mobility Goal 1, PT)  motor planning deficit  -MF      Progress/Outcomes (Bed Mobility Goal 1, PT)  goal not met  -MF         Transfer Goal 1 (PT)    Activity/Assistive Device (Transfer Goal 1, PT)  transfers, all  -MF      [place] Level/Cues Needed (Transfer Goal 1, PT)  minimum assist (75% or more patient effort);1 person assist;contact guard assist  -MF      Time Frame (Transfer Goal 1, PT)  long term goal (LTG);10 days  -MF      Barriers (Transfers Goal 1, PT)  fwd flexed posture  -MF      Progress/Outcome (Transfer Goal 1, PT)  goal not met  -MF         Gait Training Goal 1 (PT)    Activity/Assistive Device (Gait Training Goal 1, PT)  gait (walking locomotion);decrease fall risk;diminish gait deviation;improve balance and speed;increase endurance/gait distance;increase energy conservation;walker, rolling  -MF      [place] Level (Gait Training Goal 1, PT)  minimum assist (75% or more patient effort);1 person assist  -MF      Distance (Gait Goal 1, PT)  ~35-45 feet distance to walk to BR   -MF      Time Frame (Gait Training Goal 1, PT)  long term goal (LTG);10 days  -MF      Barriers (Gait Training Goal 1, PT)  fwd flexed posture  -MF      Progress/Outcome (Gait Training Goal 1, PT)  goal not met  -MF         Balance Goal 1 (PT)    Activity/Assistive Device (Balance Goal 1, PT)  standing, static;standing, dynamic  -MF      [place] Level/Cues Needed (Balance Goal 1, PT)  minimum assist (75% or more patient effort);contact guard assist;1 person assist  -MF      Time Frame (Balance Goal 1, PT)  long term goal (LTG);10 days  -MF      Barriers (Balance Goal 1, PT)  fwd flexed posture  -MF      Progress/Outcomes (Balance Goal 1, PT)  goal not met  -        User Key  (r) = Recorded By, (t) = Taken By, (c) = Cosigned By    Initials Name Provider Type Discipline     Marisol Zuniga, PTA Physical Therapy Assistant PT              PT Discharge Summary  Anticipated Discharge Disposition (PT): home  Reason for Discharge: Discharge from facility  Outcomes Achieved: Unable to make functional progress toward goals at this time  Discharge Destination: Home      Marisol Zuniga PTA   4/14/2019

## 2019-04-15 ENCOUNTER — TELEPHONE (OUTPATIENT)
Dept: INTERNAL MEDICINE | Age: 79
End: 2019-04-15

## 2019-04-15 NOTE — TELEPHONE ENCOUNTER
Spoke with Kevin Ramirez from [facility], pt was discharged from [facility] over the weekend and Kevin Ramirez would like to know if Dr. Zurdo Starks will follow and sign orders for home health for the pt.

## 2019-04-15 NOTE — TELEPHONE ENCOUNTER
Arlette 45 Transitions Initial Follow Up Call    Outreach made within 2 business days of discharge: Yes    Patient: Alon Milton Patient : 1940   MRN: [id number]   Reason for Admission:   Presenting Problem/History of Present Illness: Altered mental status. Final Discharge Diagnoses: Active Hospital Problems   Diagnosis    **Non-traumatic rhabdomyolysis    Metabolic encephalopathy    Acute renal failure superimposed on stage 3 chronic kidney disease (CMS/HCC)    Acute respiratory failure with hypoxia and hypercapnia (CMS/HCC)    SIRS (systemic inflammatory response syndrome) (CMS/HCC)    Spinal stenosis, lumbar region, without neurogenic claudication    Chronic constipation    Chronic pain syndrome    Chronic prescription opiate use    Normocytic anemia    Medical non-compliance, does not take narcotics as prescribed    Hypothyroidism (acquired)    Essential hypertension     Date of Admission: 2019  Date of Discharge: 2019       Spoke with:  Patient       Discharge department/facility: [facility] Interactive Patient Contact:  Was patient able to fill all prescriptions: Yes  Was patient instructed to bring all medications to the follow-up visit: Yes  Is patient taking all medications as directed in the discharge summary? Yes  Does patient understand their discharge instructions: Yes  Does patient have questions or concerns that need addressed prior to 7-14 day follow up office visit: no    Per patient she feels like she came home from the hospital a few days to early, but she is starting to feel better today. Patient reports a decreased appetite and fitful sleeping. Patient states her bowels are moving good, but she can't make it the restroom in time \"to urinate properly. \"   Patient states those muscles are too weak. Offered pt a hfu appt, but she wants to discuss with her  and he isn't home right now.   She is requesting that we call her back tomorrow to schedule.      Scheduled appointment with PCP within 7-14 days    Follow Up  Future Appointments   Date Time Provider Negrita Lopez   9/23/2019 12:30 PM TAMIA Soto [id number]       April D Hero Hodge LPN

## 2019-04-16 ENCOUNTER — READMISSION MANAGEMENT (OUTPATIENT)
Dept: CALL CENTER | Facility: HOSPITAL | Age: 79
End: 2019-04-16

## 2019-04-16 LAB
BACTERIA SPEC AEROBE CULT: NORMAL
BACTERIA SPEC AEROBE CULT: NORMAL

## 2019-04-17 ENCOUNTER — OFFICE VISIT (OUTPATIENT)
Dept: INTERNAL MEDICINE | Age: 79
End: 2019-04-17
Payer: MEDICARE

## 2019-04-17 VITALS
WEIGHT: 199 LBS | HEART RATE: 91 BPM | RESPIRATION RATE: 18 BRPM | BODY MASS INDEX: 31.98 KG/M2 | OXYGEN SATURATION: 97 % | DIASTOLIC BLOOD PRESSURE: 100 MMHG | SYSTOLIC BLOOD PRESSURE: 148 MMHG | HEIGHT: 66 IN

## 2019-04-17 DIAGNOSIS — M62.82 NON-TRAUMATIC RHABDOMYOLYSIS: Primary | ICD-10-CM

## 2019-04-17 DIAGNOSIS — M62.82 NON-TRAUMATIC RHABDOMYOLYSIS: ICD-10-CM

## 2019-04-17 LAB
ALBUMIN SERPL-MCNC: 3.8 G/DL (ref 3.5–5.2)
ALP BLD-CCNC: 78 U/L (ref 35–104)
ALT SERPL-CCNC: 18 U/L (ref 5–33)
ANION GAP SERPL CALCULATED.3IONS-SCNC: 14 MMOL/L (ref 7–19)
AST SERPL-CCNC: 16 U/L (ref 5–32)
BASOPHILS ABSOLUTE: 0 K/UL (ref 0–0.2)
BASOPHILS RELATIVE PERCENT: 0.2 % (ref 0–1)
BILIRUB SERPL-MCNC: 0.4 MG/DL (ref 0.2–1.2)
BUN BLDV-MCNC: 13 MG/DL (ref 8–23)
CALCIUM SERPL-MCNC: 9.9 MG/DL (ref 8.8–10.2)
CHLORIDE BLD-SCNC: 105 MMOL/L (ref 98–111)
CO2: 26 MMOL/L (ref 22–29)
CREAT SERPL-MCNC: 0.8 MG/DL (ref 0.5–0.9)
EOSINOPHILS ABSOLUTE: 0.1 K/UL (ref 0–0.6)
EOSINOPHILS RELATIVE PERCENT: 1.5 % (ref 0–5)
GFR NON-AFRICAN AMERICAN: >60
GLUCOSE BLD-MCNC: 104 MG/DL (ref 74–109)
HCT VFR BLD CALC: 29.7 % (ref 37–47)
HEMOGLOBIN: 9.6 G/DL (ref 12–16)
LYMPHOCYTES ABSOLUTE: 1.2 K/UL (ref 1.1–4.5)
LYMPHOCYTES RELATIVE PERCENT: 20.7 % (ref 20–40)
MCH RBC QN AUTO: 29.5 PG (ref 27–31)
MCHC RBC AUTO-ENTMCNC: 32.3 G/DL (ref 33–37)
MCV RBC AUTO: 91.4 FL (ref 81–99)
MONOCYTES ABSOLUTE: 0.5 K/UL (ref 0–0.9)
MONOCYTES RELATIVE PERCENT: 8.6 % (ref 0–10)
NEUTROPHILS ABSOLUTE: 4 K/UL (ref 1.5–7.5)
NEUTROPHILS RELATIVE PERCENT: 68.7 % (ref 50–65)
PDW BLD-RTO: 13.7 % (ref 11.5–14.5)
PLATELET # BLD: 196 K/UL (ref 130–400)
PMV BLD AUTO: 11 FL (ref 9.4–12.3)
POTASSIUM SERPL-SCNC: 4.5 MMOL/L (ref 3.5–5)
RBC # BLD: 3.25 M/UL (ref 4.2–5.4)
SODIUM BLD-SCNC: 145 MMOL/L (ref 136–145)
TOTAL PROTEIN: 6.6 G/DL (ref 6.6–8.7)
WBC # BLD: 5.8 K/UL (ref 4.8–10.8)

## 2019-04-17 PROCEDURE — 1111F DSCHRG MED/CURRENT MED MERGE: CPT | Performed by: NURSE PRACTITIONER

## 2019-04-17 PROCEDURE — 99495 TRANSJ CARE MGMT MOD F2F 14D: CPT | Performed by: NURSE PRACTITIONER

## 2019-04-17 RX ORDER — LISINOPRIL 10 MG/1
10 TABLET ORAL DAILY
Qty: 30 TABLET | Refills: 0 | Status: SHIPPED | OUTPATIENT
Start: 2019-04-17 | End: 2019-05-23 | Stop reason: ALTCHOICE

## 2019-04-17 RX ORDER — ERGOCALCIFEROL (VITAMIN D2) 1250 MCG
50000 CAPSULE ORAL WEEKLY
Qty: 12 CAPSULE | Refills: 3 | Status: SHIPPED | OUTPATIENT
Start: 2019-04-17 | End: 2020-01-22 | Stop reason: SDUPTHER

## 2019-04-17 NOTE — PROGRESS NOTES
DR Jj Beverly)             cyclobenzaprine (FLEXERIL) 10 MG tablet  Take 1 tablet by mouth 2 times daily as needed for Muscle spasms 3 month supply             diltiazem (TIAZAC) 240 MG extended release capsule  Take 1 capsule by mouth daily             ergocalciferol (ERGOCALCIFEROL) 45332 units capsule  Take 1 capsule by mouth once a week             FIBER FORMULA PO  Take by mouth             lansoprazole (PREVACID) 30 MG delayed release capsule  TAKE 1 CAPSULE DAILY EVERY MORNING             levothyroxine (SYNTHROID) 50 MCG tablet  Take 1 tablet by mouth daily             lisinopril (PRINIVIL;ZESTRIL) 10 MG tablet  Take 1 tablet by mouth daily             loratadine (CLARITIN) 10 MG capsule  Take 10 mg by mouth daily             Omega-3 Fatty Acids (FISH OIL) 1000 MG CAPS  Take 2 capsules by mouth 2 times daily             Prenatal Multivit-Min-Fe-FA (PRENATAL 1 + IRON PO)  Take by mouth             SUMAtriptan (IMITREX) 50 MG tablet  Take 1 tablet by mouth 2 times daily as needed for Migraine (Max 2/day -- 3 month supply)                   Medications marked \"taking\" at this time  Outpatient Medications Marked as Taking for the 4/17/19 encounter (Office Visit) with TAMIA Moreira   Medication Sig Dispense Refill    ergocalciferol (ERGOCALCIFEROL) 97424 units capsule Take 1 capsule by mouth once a week 12 capsule 3    lisinopril (PRINIVIL;ZESTRIL) 10 MG tablet Take 1 tablet by mouth daily 30 tablet 0    celecoxib (CELEBREX) 200 MG capsule TAKE 1 CAPSULE 3 TO 4 TIMES WEEKLY AS NEEDED (NEW DOSE, DECREASED MG PER DR BOYCE) 90 capsule 1    SUMAtriptan (IMITREX) 50 MG tablet Take 1 tablet by mouth 2 times daily as needed for Migraine (Max 2/day -- 3 month supply) 180 tablet 1    carvedilol (COREG) 12.5 MG tablet TAKE 1 TABLET TWICE A  tablet 1    cyclobenzaprine (FLEXERIL) 10 MG tablet Take 1 tablet by mouth 2 times daily as needed for Muscle spasms 3 month supply 180 tablet 1    diltiazem (TIAZAC) 240 MG extended release capsule Take 1 capsule by mouth daily 90 capsule 3    Omega-3 Fatty Acids (FISH OIL) 1000 MG CAPS Take 2 capsules by mouth 2 times daily 180 capsule 2    levothyroxine (SYNTHROID) 50 MCG tablet Take 1 tablet by mouth daily 30 tablet 0    lansoprazole (PREVACID) 30 MG delayed release capsule TAKE 1 CAPSULE DAILY EVERY MORNING 90 capsule 3    Prenatal Multivit-Min-Fe-FA (PRENATAL 1 + IRON PO) Take by mouth      loratadine (CLARITIN) 10 MG capsule Take 10 mg by mouth daily      FIBER FORMULA PO Take by mouth      aspirin 81 MG tablet Take 81 mg by mouth daily          Medications patient taking as of now reconciled against medications ordered at time of hospital discharge: Yes    Chief Complaint   Patient presents with    Follow-Up from Hospital     Patient is here for follow up from hospital visit for altered mental status. Patient states she has stopped taking Lipitor, oxycodone, and sinemet. History of Present illness - Follow up of Hospital diagnosis(es):   1. Non- traumatic rhabdomyolsis  2   GUSTAVO   3. ACute cystitis  4. Sepsis   5. Metabolic encephalopathy       Inpatient course: Discharge summary reviewed- see chart. Interval history/Current status:   1: Nontraumatic rhabdomyolysis. She was found to her daughter nonresponsive in a chair unknown how long she had been there. Apparently she had taken too many oxycodone and was taken to the hospital an emergent state. She can do that herself it altogether. She also has taken herself off the Fiorinal. She has home health and I started they were helping her with physical therapy etc. she says that the therapists strongly suggested she bring all her pills to the office today I went through the back she still has a new prescription of oxycodone in the back and several Fioricet.  I told her how she could take those to the police department to have been disposed instead of keeping with her home at it that is very dangerous activity. 2. Acute kidney injury this has improved on her admission but we do need to recheck it today  #3 acute cystitis  #4 sepsis has resolved   #5 metabolic encephalopathy has resolved off of these medications. A comprehensive review of systems was negative except for what was noted in the HPI. Vitals:    04/17/19 1434 04/17/19 1436   BP: (!) 148/100 (!) 148/100   Pulse: 91    Resp: 18    SpO2: 97%    Weight: 199 lb (90.3 kg)    Height: 5' 6\" (1.676 m)      Body mass index is 32.12 kg/m². Wt Readings from Last 3 Encounters:   04/17/19 199 lb (90.3 kg)   03/18/19 191 lb (86.6 kg)   12/24/18 205 lb (93 kg)     BP Readings from Last 3 Encounters:   04/17/19 (!) 148/100   03/18/19 132/82   12/24/18 (!) 152/84        Physical Exam:  General Appearance: alert and oriented to person, place and time, well developed and well- nourished, in no acute distress  units that her today than I have ever seen her  Skin: warm and dry, no rash or erythema  Head: normocephalic and atraumatic  Eyes: pupils equal, round, and reactive to light, extraocular eye movements intact, conjunctivae normal  Neck: supple and non-tender without mass, no thyromegaly or thyroid nodules, no cervical lymphadenopathy  Pulmonary/Chest: clear to auscultation bilaterally- no wheezes, rales or rhonchi, normal air movement, no respiratory distress  Cardiovascular: normal rate, regular rhythm, normal S1 and S2, no murmurs, rubs, clicks, or gallops, distal pulses intact, no carotid bruits  Abdomen: soft, non-tender, non-distended, normal bowel sounds, no masses or organomegaly  Extremities: no cyanosis, clubbing or edema  Musculoskeletal: normal range of motion, no joint swelling, deformity or tenderness  Neurologic: reflexes normal and symmetric, no cranial nerve deficit, gait, coordination I do hope that she chooses these medications.  aumatic rhabdomyolysis  We will repeat labs today   - CBC Auto Differential; Future  - Comprehensive Metabolic Panel; Future    I do hope that she chooses these medications   Fu with me in 2 weeks;   to repeat labs  #2 hypertension we do need to change her arms are 2 different medication    Medical Decision Making: moderate complexity

## 2019-04-19 ENCOUNTER — READMISSION MANAGEMENT (OUTPATIENT)
Dept: CALL CENTER | Facility: HOSPITAL | Age: 79
End: 2019-04-19

## 2019-04-19 NOTE — OUTREACH NOTE
Medical Week 1 Survey      Responses   Facility patient discharged from?  [facility]   Does the patient have one of the following disease processes/diagnoses(primary or secondary)?  Other   Is there a successful TCM telephone encounter documented?  No   Week 1 attempt successful?  Yes   Call start time  0859   Call end time  0905   Is patient permission given to speak with other caregiver?  Yes   List who call center can speak with  -Adams Tans reviewed with patient/caregiver?  Yes   Is the patient having any side effects they believe may be caused by any medication additions or changes?  No   Does the patient have all medications ordered at discharge?  Yes   Is the patient taking all medications as directed (includes completed medication regime)?  Yes   Comments regarding appointments  Has already had appt with PCP.   Does the patient have a primary care provider?   Yes   Does the patient have an appointment with their PCP within 7 days of discharge?  Yes   Has the patient kept scheduled appointments due by today?  Yes   What is the Home health agency?   [facility]   Has home health visited the patient within 72 hours of discharge?  Yes   Home health comments  Will start PT with [facility] on 04/22/19.   DME comments  uses walker   Psychosocial issues?  No   Psychosocial comments   helps her.   Did the patient receive a copy of their discharge instructions?  Yes   Nursing interventions  Reviewed instructions with patient [no computer]   What is the patient's perception of their health status since discharge?  Improving   Is the patient/caregiver able to teach back signs and symptoms related to disease process for when to call PCP?  Yes   Is the patient/caregiver able to teach back signs and symptoms related to disease process for when to call 911?  Yes   Is the patient/caregiver able to teach back the hierarchy of who to call/visit for symptoms/problems? PCP, Specialist, Home health nurse, Urgent Care, ED, 911  Yes    If the patient is a current smoker, are they able to teach back resources for cessation?  -- [nonsmoker]   Additional teach back comments  Reviewed s/s of UTI.   Week 1 call completed?  Yes   Graduated  Yes   Graduated/Revoked comments  States is doing well-denies any UTI s/s. States will call if any questions/concerns.          Beti Barnett RN

## 2019-04-30 ENCOUNTER — OFFICE VISIT (OUTPATIENT)
Dept: INTERNAL MEDICINE | Age: 79
End: 2019-04-30
Payer: MEDICARE

## 2019-04-30 VITALS
OXYGEN SATURATION: 95 % | RESPIRATION RATE: 18 BRPM | SYSTOLIC BLOOD PRESSURE: 132 MMHG | WEIGHT: 197 LBS | DIASTOLIC BLOOD PRESSURE: 82 MMHG | BODY MASS INDEX: 31.66 KG/M2 | HEART RATE: 84 BPM | HEIGHT: 66 IN

## 2019-04-30 DIAGNOSIS — M62.82 NON-TRAUMATIC RHABDOMYOLYSIS: ICD-10-CM

## 2019-04-30 DIAGNOSIS — I10 ESSENTIAL HYPERTENSION: Primary | ICD-10-CM

## 2019-04-30 DIAGNOSIS — N18.30 STAGE 3 CHRONIC KIDNEY DISEASE (HCC): ICD-10-CM

## 2019-04-30 DIAGNOSIS — G89.4 CHRONIC PAIN SYNDROME: ICD-10-CM

## 2019-04-30 LAB
ALBUMIN SERPL-MCNC: 3.8 G/DL (ref 3.5–5.2)
ALP BLD-CCNC: 85 U/L (ref 35–104)
ALT SERPL-CCNC: 11 U/L (ref 5–33)
ANION GAP SERPL CALCULATED.3IONS-SCNC: 9 MMOL/L (ref 7–19)
AST SERPL-CCNC: 15 U/L (ref 5–32)
BASOPHILS ABSOLUTE: 0 K/UL (ref 0–0.2)
BASOPHILS RELATIVE PERCENT: 0.4 % (ref 0–1)
BILIRUB SERPL-MCNC: 0.4 MG/DL (ref 0.2–1.2)
BUN BLDV-MCNC: 31 MG/DL (ref 8–23)
CALCIUM SERPL-MCNC: 9.8 MG/DL (ref 8.8–10.2)
CHLORIDE BLD-SCNC: 103 MMOL/L (ref 98–111)
CO2: 29 MMOL/L (ref 22–29)
CREAT SERPL-MCNC: 1.4 MG/DL (ref 0.5–0.9)
EOSINOPHILS ABSOLUTE: 0.3 K/UL (ref 0–0.6)
EOSINOPHILS RELATIVE PERCENT: 5.8 % (ref 0–5)
GFR NON-AFRICAN AMERICAN: 37
GLUCOSE BLD-MCNC: 115 MG/DL (ref 74–109)
HCT VFR BLD CALC: 31.8 % (ref 37–47)
HEMOGLOBIN: 10.1 G/DL (ref 12–16)
LYMPHOCYTES ABSOLUTE: 1 K/UL (ref 1.1–4.5)
LYMPHOCYTES RELATIVE PERCENT: 21 % (ref 20–40)
MCH RBC QN AUTO: 29.9 PG (ref 27–31)
MCHC RBC AUTO-ENTMCNC: 31.8 G/DL (ref 33–37)
MCV RBC AUTO: 94.1 FL (ref 81–99)
MONOCYTES ABSOLUTE: 0.6 K/UL (ref 0–0.9)
MONOCYTES RELATIVE PERCENT: 12.8 % (ref 0–10)
NEUTROPHILS ABSOLUTE: 2.7 K/UL (ref 1.5–7.5)
NEUTROPHILS RELATIVE PERCENT: 59.8 % (ref 50–65)
PDW BLD-RTO: 13.7 % (ref 11.5–14.5)
PLATELET # BLD: 204 K/UL (ref 130–400)
PMV BLD AUTO: 11.9 FL (ref 9.4–12.3)
POTASSIUM SERPL-SCNC: 4.7 MMOL/L (ref 3.5–5)
RBC # BLD: 3.38 M/UL (ref 4.2–5.4)
SODIUM BLD-SCNC: 141 MMOL/L (ref 136–145)
TOTAL PROTEIN: 6.6 G/DL (ref 6.6–8.7)
WBC # BLD: 4.5 K/UL (ref 4.8–10.8)

## 2019-04-30 PROCEDURE — G8427 DOCREV CUR MEDS BY ELIG CLIN: HCPCS | Performed by: NURSE PRACTITIONER

## 2019-04-30 PROCEDURE — 99213 OFFICE O/P EST LOW 20 MIN: CPT | Performed by: NURSE PRACTITIONER

## 2019-04-30 PROCEDURE — 1123F ACP DISCUSS/DSCN MKR DOCD: CPT | Performed by: NURSE PRACTITIONER

## 2019-04-30 PROCEDURE — G8417 CALC BMI ABV UP PARAM F/U: HCPCS | Performed by: NURSE PRACTITIONER

## 2019-04-30 PROCEDURE — 4040F PNEUMOC VAC/ADMIN/RCVD: CPT | Performed by: NURSE PRACTITIONER

## 2019-04-30 PROCEDURE — G8598 ASA/ANTIPLAT THER USED: HCPCS | Performed by: NURSE PRACTITIONER

## 2019-04-30 PROCEDURE — 1036F TOBACCO NON-USER: CPT | Performed by: NURSE PRACTITIONER

## 2019-04-30 PROCEDURE — G8400 PT W/DXA NO RESULTS DOC: HCPCS | Performed by: NURSE PRACTITIONER

## 2019-04-30 PROCEDURE — 1090F PRES/ABSN URINE INCON ASSESS: CPT | Performed by: NURSE PRACTITIONER

## 2019-04-30 ASSESSMENT — ENCOUNTER SYMPTOMS
EYE DISCHARGE: 0
CONSTIPATION: 0
VOMITING: 0
BLOOD IN STOOL: 0
SHORTNESS OF BREATH: 0
BACK PAIN: 1
ABDOMINAL PAIN: 0
DIARRHEA: 0
COUGH: 0
NAUSEA: 0
EYE ITCHING: 0
CHOKING: 0
ABDOMINAL DISTENTION: 0
SORE THROAT: 0
TROUBLE SWALLOWING: 0
COLOR CHANGE: 0
STRIDOR: 0
WHEEZING: 0

## 2019-04-30 NOTE — PATIENT INSTRUCTIONS
1.  CKD level is baseline for her;   Please try to drink as much as you can   2. Back pain ; She is going to see Dr Vicki Damon for possible surgery  3. HTN   The current medical regimen is effective;  continue present plan and medications.

## 2019-04-30 NOTE — PROGRESS NOTES
[facility]  [address]  [address] [place] [address]  Dept: [phone]  Dept Fax: [phone]  Loc: [phone]    Suhail Lau is a 70 y.o. female who presents today for her medical conditions/complaints as noted below. Suhail Lau is c/maame Chronic Kidney Disease (Patient is here for follow up visit and to discuss CBC and CMP done this am.)        HPI:     HPI   1. CKD  Her gfr is back down today but really at a baseline ; She had just gotten out of the hospital with IVF;    2.  Back pain that is chronic for her but she is still off the narcotics; She did this herself after respiratory failure and rhabdomyolysis   3. HTN:  Stable on current meds; No side effects of the meds;   Takes as directed; takes blood pressure 3-4 times a week     Chief Complaint   Patient presents with    Chronic Kidney Disease     Patient is here for follow up visit and to discuss CBC and CMP done this am.       Past Medical History:   Diagnosis Date    Adenocarcinoma of endometrium, stage 1 ([address] 11/14/2017    Anemia     Arthritis     CAD (coronary artery disease)     \"Stiff Valve\"    Chronic kidney disease     Depression     Fibrocystic breast disease     5/2/17 biopsy - benign FCBD w/microcalcifications - [facility]     GERD (gastroesophageal reflux disease)     H/O vaginal bleeding     managed Dr Cristino Kumari    Hypertension     Hypothyroidism (acquired)     Hypothyroidism (acquired)     Mixed hyperlipidemia     Obesity     Osteoarthritis     Pain of both hip joints 1/18/2016    Situational anxiety     Sleep apnea     Vitamin D deficiency       Past Surgical History:   Procedure Laterality Date    BREAST BIOPSY Right     BREAST SURGERY Left     Biopsy, Benign    CYST REMOVAL Left     Shoulder cyst, benign    DILATION AND CURETTAGE OF UTERUS      EYE SURGERY  03/2017    HYSTERECTOMY      Removed uterus and cervix due to cancer, 12/17    LEG SURGERY Left     Tib/Fib ORIF    TONSILLECTOMY         Vitals 4/30/2019 4/17/2019 4/17/2019 3/18/2019 12/24/2018 91/13/7763   SYSTOLIC 008 347 353 486 844 530   DIASTOLIC 82 816 048 82 84 88   Pulse 84 - 91 100 - 80   Temp - - - - - -   Resp 18 - 18 16 - 16   Weight 197 lb - 199 lb 191 lb - 205 lb   Height 5' 6\" - 5' 6\" 5' 6\" - 5' 2\"   BMI (wt*703/ht~2) 31.79 kg/m2 - 32.12 kg/m2 30.82 kg/m2 - 37.49 kg/m2   Pain Level - - - - - -   Some recent data might be hidden       Family History   Problem Relation Age of Onset    Cancer Mother        Social History     Tobacco Use    Smoking status: Never Smoker    Smokeless tobacco: Never Used   Substance Use Topics    Alcohol use: Yes     Comment: occassionally      Current Outpatient Medications   Medication Sig Dispense Refill    ergocalciferol (ERGOCALCIFEROL) 20599 units capsule Take 1 capsule by mouth once a week 12 capsule 3    lisinopril (PRINIVIL;ZESTRIL) 10 MG tablet Take 1 tablet by mouth daily 30 tablet 0    celecoxib (CELEBREX) 200 MG capsule TAKE 1 CAPSULE 3 TO 4 TIMES WEEKLY AS NEEDED (NEW DOSE, DECREASED MG PER DR BOYCE) 90 capsule 1    SUMAtriptan (IMITREX) 50 MG tablet Take 1 tablet by mouth 2 times daily as needed for Migraine (Max 2/day -- 3 month supply) 180 tablet 1    carvedilol (COREG) 12.5 MG tablet TAKE 1 TABLET TWICE A  tablet 1    cyclobenzaprine (FLEXERIL) 10 MG tablet Take 1 tablet by mouth 2 times daily as needed for Muscle spasms 3 month supply 180 tablet 1    diltiazem (TIAZAC) 240 MG extended release capsule Take 1 capsule by mouth daily 90 capsule 3    Omega-3 Fatty Acids (FISH OIL) 1000 MG CAPS Take 2 capsules by mouth 2 times daily 180 capsule 2    levothyroxine (SYNTHROID) 50 MCG tablet Take 1 tablet by mouth daily 30 tablet 0    carbidopa-levodopa (SINEMET)  MG per tablet Take 1 tablet by mouth 4 times daily For restless legs and cramping 360 tablet 0    lansoprazole (PREVACID) 30 MG delayed release capsule TAKE 1 CAPSULE DAILY EVERY MORNING 90 capsule 3    Prenatal Multivit-Min-Fe-FA (PRENATAL 1 + IRON PO) Take by mouth      loratadine (CLARITIN) 10 MG capsule Take 10 mg by mouth daily      FIBER FORMULA PO Take by mouth      aspirin 81 MG tablet Take 81 mg by mouth daily       No current facility-administered medications for this visit.       Allergies   Allergen Reactions    Ambien [Zolpidem Tartrate]     Codeine     Lyrica [Pregabalin]     Morphine     Requip [Ropinirole Hcl]     Tizanidine      Terrible nightmares         Health Maintenance   Topic Date Due    DTaP/Tdap/Td vaccine (1 - Tdap) 09/19/1959    DEXA (modify frequency per FRAX score)  09/19/2007    Shingles Vaccine (2 of 3) 01/15/2014    Pneumococcal 65+ years Vaccine (2 of 2 - PPSV23) 11/01/2015    Breast cancer screen  04/17/2019    TSH testing  03/18/2020    Potassium monitoring  04/17/2020    Creatinine monitoring  04/17/2020    Flu vaccine  Completed       No results found for: LABA1C  No results found for: PSA, PSADIA  TSH   Date Value Ref Range Status   03/18/2019 2.070 0.270 - 4.200 uIU/mL Final   ]  Lab Results   Component Value Date     04/30/2019    K 4.7 04/30/2019     04/30/2019    CO2 29 04/30/2019    BUN 31 (H) 04/30/2019    CREATININE 1.4 (H) 04/30/2019    GLUCOSE 115 (H) 04/30/2019    CALCIUM 9.8 04/30/2019    PROT 6.6 04/30/2019    LABALBU 3.8 04/30/2019    BILITOT 0.4 04/30/2019    ALKPHOS 85 04/30/2019    AST 15 04/30/2019    ALT 11 04/30/2019    LABGLOM 37 (A) 04/30/2019     Lab Results   Component Value Date    CHOL 152 (L) 03/18/2019    CHOL 162 09/10/2018    CHOL 162 03/07/2018     Lab Results   Component Value Date    TRIG 242 (H) 03/18/2019    TRIG 255 (H) 09/10/2018    TRIG 141 03/07/2018     Lab Results   Component Value Date    HDL 44 (L) 03/18/2019    HDL 43 (L) 09/10/2018    HDL 48 (L) 03/07/2018     Lab Results   Component Value Date    LDLCALC 60 03/18/2019    LDLCALC 68 09/10/2018    LDLCALC 86 03/07/2018     Lab Results   Component Value Date     04/30/2019    K 4.7 04/30/2019     04/30/2019    CO2 29 04/30/2019    BUN 31 04/30/2019    CREATININE 1.4 04/30/2019    GLUCOSE 115 04/30/2019    CALCIUM 9.8 04/30/2019      Lab Results   Component Value Date    WBC 4.5 (L) 04/30/2019    HGB 10.1 (L) 04/30/2019    HCT 31.8 (L) 04/30/2019    MCV 94.1 04/30/2019     04/30/2019    LYMPHOPCT 21.0 04/30/2019    RBC 3.38 (L) 04/30/2019    MCH 29.9 04/30/2019    MCHC 31.8 (L) 04/30/2019    RDW 13.7 04/30/2019     Lab Results   Component Value Date    VITD25 44.9 03/18/2019       Subjective:      Review of Systems   Constitutional: Positive for fatigue. Negative for fever and unexpected weight change. HENT: Negative for ear discharge, ear pain, mouth sores, sore throat and trouble swallowing. Eyes: Negative for discharge, itching and visual disturbance. Respiratory: Negative for cough, choking, shortness of breath, wheezing and stridor. Cardiovascular: Negative for chest pain, palpitations and leg swelling. Gastrointestinal: Negative for abdominal distention, abdominal pain, blood in stool, constipation, diarrhea, nausea and vomiting. Endocrine: Negative for cold intolerance, polydipsia and polyuria. Genitourinary: Negative for difficulty urinating, dysuria, frequency and urgency. Musculoskeletal: Positive for arthralgias and back pain (uses 2 canes for ambulation). Negative for gait problem. Skin: Negative for color change and rash. Allergic/Immunologic: Negative for food allergies and immunocompromised state. Neurological: Negative for dizziness, tremors, syncope, speech difficulty, weakness and headaches. Hematological: Negative for adenopathy. Does not bruise/bleed easily. Psychiatric/Behavioral: Negative for confusion and hallucinations. Objective:     Physical Exam   Constitutional: She is oriented to person, place, and time.  She appears well-developed and well-nourished. No distress. HENT:   Head: Normocephalic and atraumatic. Eyes: Pupils are equal, round, and reactive to light. Right eye exhibits no discharge. Left eye exhibits no discharge. No scleral icterus. Neck: Normal range of motion. Neck supple. No JVD present. No thyromegaly present. Cardiovascular: Normal rate, regular rhythm and normal heart sounds. No murmur heard. Pulmonary/Chest: Effort normal and breath sounds normal. No respiratory distress. She has no wheezes. She has no rales. Abdominal: Soft. Bowel sounds are normal. She exhibits no distension and no mass. There is no tenderness. There is no rebound and no guarding. Musculoskeletal: Normal range of motion. She exhibits no edema or tenderness. Neurological: She is alert and oriented to person, place, and time. She has normal reflexes. She displays normal reflexes. No cranial nerve deficit. Coordination normal.   Skin: Skin is warm and dry. No rash noted. No erythema. Psychiatric: Her behavior is normal. Judgment and thought content normal. She does not exhibit a depressed mood. /82   Pulse 84   Resp 18   Ht 5' 6\" (1.676 m)   Wt 197 lb (89.4 kg)   SpO2 95%   BMI 31.80 kg/m²     Assessment:       Diagnosis Orders   1. Essential hypertension     2. Stage 3 chronic kidney disease ([facility] [address]     3. Chronic pain syndrome       Labs reviewed from today     Plan:        Patient given educational materials - see patient instructions. Discussed use, benefit, and side effects of prescribed medications. Allpatient questions answered. Pt voiced understanding. Reviewed health maintenance. Instructed to continue current medications, diet and exercise. Patient agreed with treatment plan. Follow up as directed. MEDICATIONS:  No orders of the defined types were placed in this encounter. ORDERS:  No orders of the defined types were placed in this encounter.       Follow-up:  Return for keep fu appt, have labs done prior to appt.    PATIENT INSTRUCTIONS:  Patient Instructions   1. CKD level is baseline for her;   Please try to drink as much as you can   2. Back pain ; She is going to see Dr Paradise Gil for possible surgery  3. HTN   The current medical regimen is effective;  continue present plan and medications. Electronically signed by TAMIA Harkins on 4/30/2019 at 2:29 PM    EMRDragon/transcription disclaimer:  Much of this encounter note is electronic transcription/translation of spoken language to printed texts. The electronic translation of spoken language may be erroneous, or at times,nonsensical words or phrases may be inadvertently transcribed.   Although I have reviewed the note for such errors, some may still exist.

## 2019-05-13 RX ORDER — ATORVASTATIN CALCIUM 40 MG/1
TABLET, FILM COATED ORAL
Qty: 90 TABLET | Refills: 2 | Status: SHIPPED | OUTPATIENT
Start: 2019-05-13 | End: 2020-01-22 | Stop reason: SDUPTHER

## 2019-05-16 ENCOUNTER — HOSPITAL ENCOUNTER (INPATIENT)
Facility: HOSPITAL | Age: 79
LOS: 2 days | Discharge: HOME HEALTH | End: 2019-05-18
Attending: EMERGENCY MEDICINE | Admitting: INTERNAL MEDICINE

## 2019-05-16 ENCOUNTER — APPOINTMENT (OUTPATIENT)
Dept: GENERAL RADIOLOGY | Facility: HOSPITAL | Age: 79
End: 2019-05-16

## 2019-05-16 DIAGNOSIS — N17.9 AKI (ACUTE KIDNEY INJURY) (HCC): Primary | ICD-10-CM

## 2019-05-16 DIAGNOSIS — R07.9 CHEST PAIN, UNSPECIFIED TYPE: ICD-10-CM

## 2019-05-16 PROBLEM — K21.9 GASTROESOPHAGEAL REFLUX DISEASE: Status: ACTIVE | Noted: 2017-08-22

## 2019-05-16 PROBLEM — L03.119 CELLULITIS OF EXTREMITY: Status: ACTIVE | Noted: 2019-05-16

## 2019-05-16 LAB
ALBUMIN SERPL-MCNC: 4.5 G/DL (ref 3.5–5)
ALBUMIN/GLOB SERPL: 1.6 G/DL (ref 1.1–2.5)
ALP SERPL-CCNC: 74 U/L (ref 24–120)
ALT SERPL W P-5'-P-CCNC: 19 U/L (ref 0–54)
ANION GAP SERPL CALCULATED.3IONS-SCNC: 12 MMOL/L (ref 4–13)
AST SERPL-CCNC: 32 U/L (ref 7–45)
BASOPHILS # BLD AUTO: 0.04 10*3/MM3 (ref 0–0.2)
BASOPHILS NFR BLD AUTO: 0.4 % (ref 0–2)
BILIRUB SERPL-MCNC: 0.6 MG/DL (ref 0.1–1)
BUN BLD-MCNC: 42 MG/DL (ref 5–21)
BUN/CREAT SERPL: 14.3 (ref 7–25)
CALCIUM SPEC-SCNC: 9.7 MG/DL (ref 8.4–10.4)
CHLORIDE SERPL-SCNC: 97 MMOL/L (ref 98–110)
CK SERPL-CCNC: 138 U/L (ref 0–203)
CO2 SERPL-SCNC: 25 MMOL/L (ref 24–31)
CREAT BLD-MCNC: 2.93 MG/DL (ref 0.5–1.4)
DEPRECATED RDW RBC AUTO: 42.3 FL (ref 40–54)
EOSINOPHIL # BLD AUTO: 0.33 10*3/MM3 (ref 0–0.7)
EOSINOPHIL NFR BLD AUTO: 3 % (ref 0–4)
ERYTHROCYTE [DISTWIDTH] IN BLOOD BY AUTOMATED COUNT: 13.5 % (ref 12–15)
GFR SERPL CREATININE-BSD FRML MDRD: 16 ML/MIN/1.73
GLOBULIN UR ELPH-MCNC: 2.8 GM/DL
GLUCOSE BLD-MCNC: 128 MG/DL (ref 70–100)
HCT VFR BLD AUTO: 31.4 % (ref 37–47)
HGB BLD-MCNC: 10.6 G/DL (ref 12–16)
HOLD SPECIMEN: NORMAL
HOLD SPECIMEN: NORMAL
IMM GRANULOCYTES # BLD AUTO: 0.04 10*3/MM3 (ref 0–0.05)
IMM GRANULOCYTES NFR BLD AUTO: 0.4 % (ref 0–5)
LYMPHOCYTES # BLD AUTO: 2.01 10*3/MM3 (ref 0.72–4.86)
LYMPHOCYTES NFR BLD AUTO: 18.2 % (ref 15–45)
MCH RBC QN AUTO: 29.4 PG (ref 28–32)
MCHC RBC AUTO-ENTMCNC: 33.8 G/DL (ref 33–36)
MCV RBC AUTO: 87.2 FL (ref 82–98)
MONOCYTES # BLD AUTO: 1.27 10*3/MM3 (ref 0.19–1.3)
MONOCYTES NFR BLD AUTO: 11.5 % (ref 4–12)
MYOGLOBIN SERPL-MCNC: 374.7 NG/ML (ref 0–110)
NEUTROPHILS # BLD AUTO: 7.33 10*3/MM3 (ref 1.87–8.4)
NEUTROPHILS NFR BLD AUTO: 66.5 % (ref 39–78)
NRBC BLD AUTO-RTO: 0 /100 WBC (ref 0–0.2)
PLATELET # BLD AUTO: 229 10*3/MM3 (ref 130–400)
PMV BLD AUTO: 10.9 FL (ref 6–12)
POTASSIUM BLD-SCNC: 4.7 MMOL/L (ref 3.5–5.3)
PROT SERPL-MCNC: 7.3 G/DL (ref 6.3–8.7)
RBC # BLD AUTO: 3.6 10*6/MM3 (ref 4.2–5.4)
SODIUM BLD-SCNC: 134 MMOL/L (ref 135–145)
TROPONIN I SERPL-MCNC: <0.012 NG/ML (ref 0–0.03)
TROPONIN I SERPL-MCNC: <0.012 NG/ML (ref 0–0.03)
WBC NRBC COR # BLD: 11.02 10*3/MM3 (ref 4.8–10.8)
WHOLE BLOOD HOLD SPECIMEN: NORMAL
WHOLE BLOOD HOLD SPECIMEN: NORMAL

## 2019-05-16 PROCEDURE — 25010000002 ENOXAPARIN PER 10 MG: Performed by: NURSE PRACTITIONER

## 2019-05-16 PROCEDURE — 80053 COMPREHEN METABOLIC PANEL: CPT

## 2019-05-16 PROCEDURE — 93005 ELECTROCARDIOGRAM TRACING: CPT

## 2019-05-16 PROCEDURE — 71045 X-RAY EXAM CHEST 1 VIEW: CPT

## 2019-05-16 PROCEDURE — 93005 ELECTROCARDIOGRAM TRACING: CPT | Performed by: EMERGENCY MEDICINE

## 2019-05-16 PROCEDURE — 82550 ASSAY OF CK (CPK): CPT | Performed by: EMERGENCY MEDICINE

## 2019-05-16 PROCEDURE — 93010 ELECTROCARDIOGRAM REPORT: CPT | Performed by: INTERNAL MEDICINE

## 2019-05-16 PROCEDURE — 85025 COMPLETE CBC W/AUTO DIFF WBC: CPT

## 2019-05-16 PROCEDURE — 83874 ASSAY OF MYOGLOBIN: CPT | Performed by: EMERGENCY MEDICINE

## 2019-05-16 PROCEDURE — 87205 SMEAR GRAM STAIN: CPT | Performed by: NURSE PRACTITIONER

## 2019-05-16 PROCEDURE — 99284 EMERGENCY DEPT VISIT MOD MDM: CPT

## 2019-05-16 PROCEDURE — 94799 UNLISTED PULMONARY SVC/PX: CPT

## 2019-05-16 PROCEDURE — 84484 ASSAY OF TROPONIN QUANT: CPT | Performed by: EMERGENCY MEDICINE

## 2019-05-16 RX ORDER — LEVOTHYROXINE SODIUM 0.05 MG/1
50 TABLET ORAL
Status: DISCONTINUED | OUTPATIENT
Start: 2019-05-17 | End: 2019-05-18 | Stop reason: HOSPADM

## 2019-05-16 RX ORDER — OXYCODONE HYDROCHLORIDE 15 MG/1
15 TABLET, FILM COATED, EXTENDED RELEASE ORAL EVERY 12 HOURS SCHEDULED
Status: DISCONTINUED | OUTPATIENT
Start: 2019-05-16 | End: 2019-05-16 | Stop reason: SDUPTHER

## 2019-05-16 RX ORDER — CYCLOBENZAPRINE HCL 10 MG
10 TABLET ORAL 2 TIMES DAILY PRN
Status: DISCONTINUED | OUTPATIENT
Start: 2019-05-16 | End: 2019-05-18 | Stop reason: HOSPADM

## 2019-05-16 RX ORDER — ASPIRIN 81 MG/1
324 TABLET, CHEWABLE ORAL ONCE
Status: COMPLETED | OUTPATIENT
Start: 2019-05-16 | End: 2019-05-16

## 2019-05-16 RX ORDER — ONDANSETRON 4 MG/1
4 TABLET, FILM COATED ORAL EVERY 6 HOURS PRN
Status: DISCONTINUED | OUTPATIENT
Start: 2019-05-16 | End: 2019-05-18 | Stop reason: HOSPADM

## 2019-05-16 RX ORDER — SUMATRIPTAN 50 MG/1
50 TABLET, FILM COATED ORAL
Status: DISCONTINUED | OUTPATIENT
Start: 2019-05-16 | End: 2019-05-18 | Stop reason: HOSPADM

## 2019-05-16 RX ORDER — LISINOPRIL 10 MG/1
10 TABLET ORAL DAILY
COMMUNITY
End: 2019-05-18 | Stop reason: HOSPADM

## 2019-05-16 RX ORDER — ONDANSETRON 2 MG/ML
4 INJECTION INTRAMUSCULAR; INTRAVENOUS EVERY 6 HOURS PRN
Status: DISCONTINUED | OUTPATIENT
Start: 2019-05-16 | End: 2019-05-18 | Stop reason: HOSPADM

## 2019-05-16 RX ORDER — PANTOPRAZOLE SODIUM 40 MG/1
40 TABLET, DELAYED RELEASE ORAL EVERY MORNING
Status: DISCONTINUED | OUTPATIENT
Start: 2019-05-17 | End: 2019-05-18 | Stop reason: HOSPADM

## 2019-05-16 RX ORDER — OXYCODONE HYDROCHLORIDE 15 MG/1
15 TABLET, FILM COATED, EXTENDED RELEASE ORAL EVERY 12 HOURS SCHEDULED
Status: DISCONTINUED | OUTPATIENT
Start: 2019-05-16 | End: 2019-05-18 | Stop reason: HOSPADM

## 2019-05-16 RX ORDER — ATORVASTATIN CALCIUM 40 MG/1
40 TABLET, FILM COATED ORAL NIGHTLY
Status: DISCONTINUED | OUTPATIENT
Start: 2019-05-16 | End: 2019-05-18 | Stop reason: HOSPADM

## 2019-05-16 RX ORDER — SODIUM CHLORIDE 0.9 % (FLUSH) 0.9 %
3 SYRINGE (ML) INJECTION EVERY 12 HOURS SCHEDULED
Status: DISCONTINUED | OUTPATIENT
Start: 2019-05-16 | End: 2019-05-18 | Stop reason: HOSPADM

## 2019-05-16 RX ORDER — CARVEDILOL 6.25 MG/1
12.5 TABLET ORAL 2 TIMES DAILY WITH MEALS
Status: DISCONTINUED | OUTPATIENT
Start: 2019-05-16 | End: 2019-05-18 | Stop reason: HOSPADM

## 2019-05-16 RX ORDER — SODIUM CHLORIDE 0.9 % (FLUSH) 0.9 %
3-10 SYRINGE (ML) INJECTION AS NEEDED
Status: DISCONTINUED | OUTPATIENT
Start: 2019-05-16 | End: 2019-05-18 | Stop reason: HOSPADM

## 2019-05-16 RX ORDER — SODIUM CHLORIDE 0.9 % (FLUSH) 0.9 %
10 SYRINGE (ML) INJECTION AS NEEDED
Status: DISCONTINUED | OUTPATIENT
Start: 2019-05-16 | End: 2019-05-18 | Stop reason: HOSPADM

## 2019-05-16 RX ORDER — SODIUM CHLORIDE 9 MG/ML
100 INJECTION, SOLUTION INTRAVENOUS CONTINUOUS
Status: DISCONTINUED | OUTPATIENT
Start: 2019-05-16 | End: 2019-05-18 | Stop reason: HOSPADM

## 2019-05-16 RX ORDER — DILTIAZEM HYDROCHLORIDE 240 MG/1
240 CAPSULE, COATED, EXTENDED RELEASE ORAL
Status: DISCONTINUED | OUTPATIENT
Start: 2019-05-16 | End: 2019-05-18 | Stop reason: HOSPADM

## 2019-05-16 RX ADMIN — SODIUM CHLORIDE 1000 ML: 9 INJECTION, SOLUTION INTRAVENOUS at 14:17

## 2019-05-16 RX ADMIN — OXYCODONE HYDROCHLORIDE 15 MG: 15 TABLET, FILM COATED, EXTENDED RELEASE ORAL at 15:35

## 2019-05-16 RX ADMIN — DILTIAZEM HYDROCHLORIDE 240 MG: 240 CAPSULE, EXTENDED RELEASE ORAL at 17:13

## 2019-05-16 RX ADMIN — SUMATRIPTAN SUCCINATE 50 MG: 50 TABLET ORAL at 21:54

## 2019-05-16 RX ADMIN — CYCLOBENZAPRINE 10 MG: 10 TABLET, FILM COATED ORAL at 20:30

## 2019-05-16 RX ADMIN — CARVEDILOL 12.5 MG: 6.25 TABLET, FILM COATED ORAL at 17:14

## 2019-05-16 RX ADMIN — ENOXAPARIN SODIUM 30 MG: 30 INJECTION SUBCUTANEOUS at 17:16

## 2019-05-16 RX ADMIN — ASPIRIN 81 MG 324 MG: 81 TABLET ORAL at 13:24

## 2019-05-16 RX ADMIN — SODIUM CHLORIDE 100 ML/HR: 9 INJECTION, SOLUTION INTRAVENOUS at 17:13

## 2019-05-16 RX ADMIN — ATORVASTATIN CALCIUM 40 MG: 40 TABLET, FILM COATED ORAL at 20:49

## 2019-05-17 ENCOUNTER — APPOINTMENT (OUTPATIENT)
Dept: ULTRASOUND IMAGING | Facility: HOSPITAL | Age: 79
End: 2019-05-17

## 2019-05-17 ENCOUNTER — APPOINTMENT (OUTPATIENT)
Dept: CARDIOLOGY | Facility: HOSPITAL | Age: 79
End: 2019-05-17

## 2019-05-17 LAB
ANION GAP SERPL CALCULATED.3IONS-SCNC: 6 MMOL/L (ref 4–13)
ARTICHOKE IGE QN: 96 MG/DL (ref 0–99)
BH CV ECHO MEAS - AO MAX PG (FULL): 2.6 MMHG
BH CV ECHO MEAS - AO MAX PG: 13 MMHG
BH CV ECHO MEAS - AO MEAN PG (FULL): 2 MMHG
BH CV ECHO MEAS - AO MEAN PG: 9 MMHG
BH CV ECHO MEAS - AO ROOT AREA (BSA CORRECTED): 1.7
BH CV ECHO MEAS - AO ROOT AREA: 8.6 CM^2
BH CV ECHO MEAS - AO ROOT DIAM: 3.3 CM
BH CV ECHO MEAS - AO V2 MAX: 180 CM/SEC
BH CV ECHO MEAS - AO V2 MEAN: 140 CM/SEC
BH CV ECHO MEAS - AO V2 VTI: 42.3 CM
BH CV ECHO MEAS - AVA(I,A): 2 CM^2
BH CV ECHO MEAS - AVA(I,D): 2 CM^2
BH CV ECHO MEAS - AVA(V,A): 2 CM^2
BH CV ECHO MEAS - AVA(V,D): 2 CM^2
BH CV ECHO MEAS - BSA(HAYCOCK): 2 M^2
BH CV ECHO MEAS - BSA: 1.9 M^2
BH CV ECHO MEAS - BZI_BMI: 36.9 KILOGRAMS/M^2
BH CV ECHO MEAS - BZI_METRIC_HEIGHT: 157.5 CM
BH CV ECHO MEAS - BZI_METRIC_WEIGHT: 91.6 KG
BH CV ECHO MEAS - EDV(CUBED): 41.8 ML
BH CV ECHO MEAS - EDV(MOD-SP4): 66.7 ML
BH CV ECHO MEAS - EDV(TEICH): 49.8 ML
BH CV ECHO MEAS - EF(CUBED): 80.3 %
BH CV ECHO MEAS - EF(MOD-SP4): 78.1 %
BH CV ECHO MEAS - EF(TEICH): 73.8 %
BH CV ECHO MEAS - ESV(CUBED): 8.2 ML
BH CV ECHO MEAS - ESV(MOD-SP4): 14.6 ML
BH CV ECHO MEAS - ESV(TEICH): 13.1 ML
BH CV ECHO MEAS - FS: 41.8 %
BH CV ECHO MEAS - IVS/LVPW: 1.1
BH CV ECHO MEAS - IVSD: 1.4 CM
BH CV ECHO MEAS - LAT PEAK E' VEL: 14.3 CM/SEC
BH CV ECHO MEAS - LV DIASTOLIC VOL/BSA (35-75): 34.7 ML/M^2
BH CV ECHO MEAS - LV MASS(C)D: 153.8 GRAMS
BH CV ECHO MEAS - LV MASS(C)DI: 80.1 GRAMS/M^2
BH CV ECHO MEAS - LV MAX PG: 10.4 MMHG
BH CV ECHO MEAS - LV MEAN PG: 7 MMHG
BH CV ECHO MEAS - LV SYSTOLIC VOL/BSA (12-30): 7.6 ML/M^2
BH CV ECHO MEAS - LV V1 MAX: 161 CM/SEC
BH CV ECHO MEAS - LV V1 MEAN: 124 CM/SEC
BH CV ECHO MEAS - LV V1 VTI: 37.4 CM
BH CV ECHO MEAS - LVIDD: 3.5 CM
BH CV ECHO MEAS - LVIDS: 2 CM
BH CV ECHO MEAS - LVLD AP4: 6.9 CM
BH CV ECHO MEAS - LVLS AP4: 5.5 CM
BH CV ECHO MEAS - LVOT AREA (M): 2.3 CM^2
BH CV ECHO MEAS - LVOT AREA: 2.3 CM^2
BH CV ECHO MEAS - LVOT DIAM: 1.7 CM
BH CV ECHO MEAS - LVPWD: 1.3 CM
BH CV ECHO MEAS - MED PEAK E' VEL: 23.5 CM/SEC
BH CV ECHO MEAS - MV A MAX VEL: 171 CM/SEC
BH CV ECHO MEAS - MV DEC TIME: 0.16 SEC
BH CV ECHO MEAS - MV E MAX VEL: 146 CM/SEC
BH CV ECHO MEAS - MV E/A: 0.85
BH CV ECHO MEAS - RAP SYSTOLE: 5 MMHG
BH CV ECHO MEAS - RVSP: 57.1 MMHG
BH CV ECHO MEAS - SI(AO): 188.5 ML/M^2
BH CV ECHO MEAS - SI(CUBED): 17.5 ML/M^2
BH CV ECHO MEAS - SI(LVOT): 44.2 ML/M^2
BH CV ECHO MEAS - SI(MOD-SP4): 27.1 ML/M^2
BH CV ECHO MEAS - SI(TEICH): 19.2 ML/M^2
BH CV ECHO MEAS - SV(AO): 361.8 ML
BH CV ECHO MEAS - SV(CUBED): 33.5 ML
BH CV ECHO MEAS - SV(LVOT): 84.9 ML
BH CV ECHO MEAS - SV(MOD-SP4): 52.1 ML
BH CV ECHO MEAS - SV(TEICH): 36.8 ML
BH CV ECHO MEAS - TR MAX VEL: 361 CM/SEC
BH CV ECHO MEASUREMENTS AVERAGE E/E' RATIO: 7.72
BH CV STRESS BP STAGE 1: NORMAL
BH CV STRESS BP STAGE 2: NORMAL
BH CV STRESS BP STAGE 3: NORMAL
BH CV STRESS DOB - ATROPINE STAGE 3: 1
BH CV STRESS DOSE DOBUTAMINE STAGE 1: 10
BH CV STRESS DOSE DOBUTAMINE STAGE 2: 20
BH CV STRESS DOSE DOBUTAMINE STAGE 3: 30
BH CV STRESS DURATION MIN STAGE 1: 3
BH CV STRESS DURATION MIN STAGE 2: 3
BH CV STRESS DURATION MIN STAGE 3: 3
BH CV STRESS DURATION SEC STAGE 1: 0
BH CV STRESS DURATION SEC STAGE 2: 0
BH CV STRESS DURATION SEC STAGE 3: 56
BH CV STRESS ECHO POST STRESS EJECTION FRACTION EF: 65 %
BH CV STRESS HR STAGE 1: 92
BH CV STRESS HR STAGE 2: 94
BH CV STRESS HR STAGE 3: 124
BH CV STRESS PROTOCOL 1: NORMAL
BH CV STRESS RECOVERY BP: NORMAL MMHG
BH CV STRESS RECOVERY HR: 100 BPM
BH CV STRESS STAGE 1: 1
BH CV STRESS STAGE 2: 2
BH CV STRESS STAGE 3: 3
BUN BLD-MCNC: 36 MG/DL (ref 5–21)
BUN/CREAT SERPL: 18 (ref 7–25)
CALCIUM SPEC-SCNC: 9 MG/DL (ref 8.4–10.4)
CHLORIDE SERPL-SCNC: 106 MMOL/L (ref 98–110)
CHOLEST SERPL-MCNC: 157 MG/DL (ref 130–200)
CO2 SERPL-SCNC: 24 MMOL/L (ref 24–31)
CREAT BLD-MCNC: 2 MG/DL (ref 0.5–1.4)
DEPRECATED RDW RBC AUTO: 42.8 FL (ref 40–54)
ERYTHROCYTE [DISTWIDTH] IN BLOOD BY AUTOMATED COUNT: 13.5 % (ref 12–15)
GFR SERPL CREATININE-BSD FRML MDRD: 24 ML/MIN/1.73
GLUCOSE BLD-MCNC: 103 MG/DL (ref 70–100)
HBA1C MFR BLD: 4.9 %
HCT VFR BLD AUTO: 27.5 % (ref 37–47)
HDLC SERPL-MCNC: 32 MG/DL
HGB BLD-MCNC: 9 G/DL (ref 12–16)
LDLC/HDLC SERPL: 2.91 {RATIO}
LEFT ATRIUM VOLUME INDEX: 44.2 ML/M2
LEFT ATRIUM VOLUME: 84.8 CM3
LV EF 2D ECHO EST: 60 %
LV EF 2D ECHO EST: 65 %
MAXIMAL PREDICTED HEART RATE: 144 BPM
MAXIMAL PREDICTED HEART RATE: 144 BPM
MCH RBC QN AUTO: 28.9 PG (ref 28–32)
MCHC RBC AUTO-ENTMCNC: 32.7 G/DL (ref 33–36)
MCV RBC AUTO: 88.4 FL (ref 82–98)
PERCENT MAX PREDICTED HR: 86.11 %
PLATELET # BLD AUTO: 172 10*3/MM3 (ref 130–400)
PMV BLD AUTO: 11.9 FL (ref 6–12)
POTASSIUM BLD-SCNC: 4.2 MMOL/L (ref 3.5–5.3)
RBC # BLD AUTO: 3.11 10*6/MM3 (ref 4.2–5.4)
SODIUM BLD-SCNC: 136 MMOL/L (ref 135–145)
STRESS BASELINE BP: NORMAL MMHG
STRESS BASELINE HR: 92 BPM
STRESS PERCENT HR: 101 %
STRESS POST EXERCISE DUR MIN: 9 MIN
STRESS POST EXERCISE DUR SEC: 56 SEC
STRESS POST PEAK BP: NORMAL MMHG
STRESS POST PEAK HR: 124 BPM
STRESS TARGET HR: 122 BPM
STRESS TARGET HR: 122 BPM
TRIGL SERPL-MCNC: 159 MG/DL (ref 0–149)
TSH SERPL DL<=0.05 MIU/L-ACNC: 1.75 MIU/ML (ref 0.47–4.68)
WBC NRBC COR # BLD: 5.34 10*3/MM3 (ref 4.8–10.8)

## 2019-05-17 PROCEDURE — 93970 EXTREMITY STUDY: CPT

## 2019-05-17 PROCEDURE — 94760 N-INVAS EAR/PLS OXIMETRY 1: CPT

## 2019-05-17 PROCEDURE — 93350 STRESS TTE ONLY: CPT | Performed by: INTERNAL MEDICINE

## 2019-05-17 PROCEDURE — 84443 ASSAY THYROID STIM HORMONE: CPT | Performed by: NURSE PRACTITIONER

## 2019-05-17 PROCEDURE — 93005 ELECTROCARDIOGRAM TRACING: CPT | Performed by: NURSE PRACTITIONER

## 2019-05-17 PROCEDURE — 80048 BASIC METABOLIC PNL TOTAL CA: CPT | Performed by: NURSE PRACTITIONER

## 2019-05-17 PROCEDURE — 93306 TTE W/DOPPLER COMPLETE: CPT

## 2019-05-17 PROCEDURE — 83036 HEMOGLOBIN GLYCOSYLATED A1C: CPT | Performed by: NURSE PRACTITIONER

## 2019-05-17 PROCEDURE — 80061 LIPID PANEL: CPT | Performed by: NURSE PRACTITIONER

## 2019-05-17 PROCEDURE — 93018 CV STRESS TEST I&R ONLY: CPT | Performed by: INTERNAL MEDICINE

## 2019-05-17 PROCEDURE — 25010000003 DOBUTAMINE PER 250 MG: Performed by: INTERNAL MEDICINE

## 2019-05-17 PROCEDURE — 93017 CV STRESS TEST TRACING ONLY: CPT

## 2019-05-17 PROCEDURE — 93970 EXTREMITY STUDY: CPT | Performed by: SURGERY

## 2019-05-17 PROCEDURE — 85027 COMPLETE CBC AUTOMATED: CPT | Performed by: NURSE PRACTITIONER

## 2019-05-17 PROCEDURE — 93010 ELECTROCARDIOGRAM REPORT: CPT | Performed by: INTERNAL MEDICINE

## 2019-05-17 PROCEDURE — 76775 US EXAM ABDO BACK WALL LIM: CPT

## 2019-05-17 PROCEDURE — 93352 ADMIN ECG CONTRAST AGENT: CPT | Performed by: INTERNAL MEDICINE

## 2019-05-17 PROCEDURE — 94799 UNLISTED PULMONARY SVC/PX: CPT

## 2019-05-17 PROCEDURE — 25010000002 PERFLUTREN 6.52 MG/ML SUSPENSION: Performed by: INTERNAL MEDICINE

## 2019-05-17 PROCEDURE — 93350 STRESS TTE ONLY: CPT

## 2019-05-17 PROCEDURE — 25010000002 ENOXAPARIN PER 10 MG: Performed by: NURSE PRACTITIONER

## 2019-05-17 PROCEDURE — 93306 TTE W/DOPPLER COMPLETE: CPT | Performed by: INTERNAL MEDICINE

## 2019-05-17 RX ORDER — DOBUTAMINE HYDROCHLORIDE 100 MG/100ML
10-50 INJECTION INTRAVENOUS CONTINUOUS
Status: DISCONTINUED | OUTPATIENT
Start: 2019-05-17 | End: 2019-05-18 | Stop reason: HOSPADM

## 2019-05-17 RX ADMIN — LEVOTHYROXINE SODIUM 50 MCG: 50 TABLET ORAL at 05:44

## 2019-05-17 RX ADMIN — SODIUM CHLORIDE 100 ML/HR: 9 INJECTION, SOLUTION INTRAVENOUS at 04:30

## 2019-05-17 RX ADMIN — OXYCODONE HYDROCHLORIDE 15 MG: 15 TABLET, FILM COATED, EXTENDED RELEASE ORAL at 18:26

## 2019-05-17 RX ADMIN — OXYCODONE HYDROCHLORIDE 15 MG: 15 TABLET, FILM COATED, EXTENDED RELEASE ORAL at 05:44

## 2019-05-17 RX ADMIN — ATORVASTATIN CALCIUM 40 MG: 40 TABLET, FILM COATED ORAL at 20:54

## 2019-05-17 RX ADMIN — Medication 10 MCG/KG/MIN: at 14:00

## 2019-05-17 RX ADMIN — ENOXAPARIN SODIUM 30 MG: 30 INJECTION SUBCUTANEOUS at 18:26

## 2019-05-17 RX ADMIN — DILTIAZEM HYDROCHLORIDE 240 MG: 240 CAPSULE, EXTENDED RELEASE ORAL at 10:26

## 2019-05-17 RX ADMIN — CARVEDILOL 12.5 MG: 6.25 TABLET, FILM COATED ORAL at 18:27

## 2019-05-17 RX ADMIN — PERFLUTREN 8.48 MG: 6.52 INJECTION, SUSPENSION INTRAVENOUS at 14:00

## 2019-05-17 RX ADMIN — PANTOPRAZOLE SODIUM 40 MG: 40 TABLET, DELAYED RELEASE ORAL at 05:44

## 2019-05-17 RX ADMIN — ATROPINE SULFATE 1 MG: 0.1 INJECTION PARENTERAL at 14:44

## 2019-05-17 RX ADMIN — SODIUM CHLORIDE, PRESERVATIVE FREE 3 ML: 5 INJECTION INTRAVENOUS at 20:55

## 2019-05-17 RX ADMIN — CARVEDILOL 12.5 MG: 6.25 TABLET, FILM COATED ORAL at 10:24

## 2019-05-18 VITALS
WEIGHT: 202.19 LBS | RESPIRATION RATE: 18 BRPM | TEMPERATURE: 98.2 F | HEART RATE: 83 BPM | DIASTOLIC BLOOD PRESSURE: 58 MMHG | SYSTOLIC BLOOD PRESSURE: 152 MMHG | OXYGEN SATURATION: 98 % | HEIGHT: 62 IN | BODY MASS INDEX: 37.21 KG/M2

## 2019-05-18 PROBLEM — I87.2 VENOUS INSUFFICIENCY OF BOTH LOWER EXTREMITIES: Status: ACTIVE | Noted: 2019-05-18

## 2019-05-18 PROBLEM — E66.9 OBESITY (BMI 30-39.9): Status: ACTIVE | Noted: 2018-07-10

## 2019-05-18 PROBLEM — R03.1 LOW BLOOD PRESSURE READING: Status: ACTIVE | Noted: 2019-05-18

## 2019-05-18 PROBLEM — R60.9 FLUID RETENTION: Status: ACTIVE | Noted: 2019-05-18

## 2019-05-18 LAB
ANION GAP SERPL CALCULATED.3IONS-SCNC: 6 MMOL/L (ref 4–13)
BUN BLD-MCNC: 24 MG/DL (ref 5–21)
BUN/CREAT SERPL: 20.3 (ref 7–25)
CALCIUM SPEC-SCNC: 9.2 MG/DL (ref 8.4–10.4)
CHLORIDE SERPL-SCNC: 108 MMOL/L (ref 98–110)
CO2 SERPL-SCNC: 25 MMOL/L (ref 24–31)
CREAT BLD-MCNC: 1.18 MG/DL (ref 0.5–1.4)
EOSINOPHIL SPEC QL WRIGHT STN: NORMAL %
GFR SERPL CREATININE-BSD FRML MDRD: 45 ML/MIN/1.73
GLUCOSE BLD-MCNC: 92 MG/DL (ref 70–100)
POTASSIUM BLD-SCNC: 4.2 MMOL/L (ref 3.5–5.3)
SODIUM BLD-SCNC: 139 MMOL/L (ref 135–145)

## 2019-05-18 PROCEDURE — 80048 BASIC METABOLIC PNL TOTAL CA: CPT | Performed by: INTERNAL MEDICINE

## 2019-05-18 RX ORDER — BUMETANIDE 1 MG/1
1 TABLET ORAL DAILY
Qty: 15 TABLET | Refills: 0 | Status: SHIPPED | OUTPATIENT
Start: 2019-05-18 | End: 2021-08-13

## 2019-05-18 RX ADMIN — OXYCODONE HYDROCHLORIDE 15 MG: 15 TABLET, FILM COATED, EXTENDED RELEASE ORAL at 06:00

## 2019-05-18 RX ADMIN — PANTOPRAZOLE SODIUM 40 MG: 40 TABLET, DELAYED RELEASE ORAL at 06:00

## 2019-05-18 RX ADMIN — LEVOTHYROXINE SODIUM 50 MCG: 50 TABLET ORAL at 06:00

## 2019-05-18 RX ADMIN — CARVEDILOL 12.5 MG: 6.25 TABLET, FILM COATED ORAL at 08:13

## 2019-05-18 RX ADMIN — DILTIAZEM HYDROCHLORIDE 240 MG: 240 CAPSULE, EXTENDED RELEASE ORAL at 08:13

## 2019-05-19 ENCOUNTER — READMISSION MANAGEMENT (OUTPATIENT)
Dept: CALL CENTER | Facility: HOSPITAL | Age: 79
End: 2019-05-19

## 2019-05-19 NOTE — OUTREACH NOTE
Prep Survey      Responses   Facility patient discharged from?  [facility]   Is patient eligible?  Yes   Discharge diagnosis  Acute renal failure superimposed on stage 3 chronic kidney disease    Does the patient have one of the following disease processes/diagnoses(primary or secondary)?  Other   Does the patient have Home health ordered?  No   What is the Home health agency?   declined    Is there a DME ordered?  Yes   What DME was ordered?  RW/ BSC ready for pickup at [facility]   Medication alerts for this patient  [employer]    Prep survey completed?  Yes          Cherie Smiley RN

## 2019-05-20 NOTE — TELEPHONE ENCOUNTER
Nesha Rea from [facility] would like to know if she can go out for resumption of care.   She can be contacted at [phone]

## 2019-05-21 ENCOUNTER — READMISSION MANAGEMENT (OUTPATIENT)
Dept: CALL CENTER | Facility: HOSPITAL | Age: 79
End: 2019-05-21

## 2019-05-21 ENCOUNTER — TELEPHONE (OUTPATIENT)
Dept: INTERNAL MEDICINE | Age: 79
End: 2019-05-21

## 2019-05-21 DIAGNOSIS — N18.9 CHRONIC KIDNEY DISEASE, UNSPECIFIED CKD STAGE: Primary | ICD-10-CM

## 2019-05-21 NOTE — OUTREACH NOTE
"Medical Week 1 Survey      Responses   Facility patient discharged from?  [facility]   Does the patient have one of the following disease processes/diagnoses(primary or secondary)?  Other   Is there a successful TCM telephone encounter documented?  No   Week 1 attempt successful?  Yes   Call start time  1727   Revoke  Decline to participate [She states \" I am fine, advised to call if need assist.\"]          Lisa Schwartz RN  "

## 2019-05-21 NOTE — TELEPHONE ENCOUNTER
[facility] Transitions Initial Follow Up Call    Outreach made within 2 business days of discharge: Yes    Patient: William Rucker Patient : 1940   MRN: [id number]  Reason for Admission: patient was admitted on May 16, 2019 due to Acute renal failure and chest pain. Discharge Diagnoses: Active Hospital Problems   Diagnosis    **Acute renal failure superimposed on stage 3 chronic kidney disease (CMS/HCC)    Venous insufficiency of both lower extremities    Fluid retention    Low blood pressure reading    Essential hypertension    Hypothyroidism (acquired)    Obesity (BMI 30-39. 9)    Spinal stenosis, lumbar region, without neurogenic claudication    Gastroesophageal reflux disease     Presenting Problem/History of Present Illness:  GUSTAVO (acute kidney injury) (CMS/HCC) [N17.9]        Discharge Date:  May 18, 2019       Spoke with:patient. Discharge department/facility: [facility], [address] [place] [address] Interactive Patient Contact:  Was patient able to fill all prescriptions: Yes  Was patient instructed to bring all medications to the follow-up visit: Yes  Is patient taking all medications as directed in the discharge summary? Yes  Does patient understand their discharge instructions: Yes  Does patient have questions or concerns that need addressed prior to 7-14 day follow up office visit: yes - Per patient she states she is taking Bumex as directed but she states she recalls that Dr. Tali Elias advised her against ever taking fluid pills as it would hurt her kidneys. She would like reassurance OK for taking Bumex. She states bladder is working overtime. Bowels doing OK. Appetite is fair. Not sleeping very well. Patient will bring medications to HFU for review and will contact office with any concerns.       Scheduled appointment with PCP within 7-14 days    Follow Up  Future Appointments   Date Time Provider Negrita Lopez   [id number]  3:03 PM TAMIA Angel [id number] 9/23/2019 12:30 PM TAMIA Bailon LPN

## 2019-05-23 ENCOUNTER — OFFICE VISIT (OUTPATIENT)
Dept: INTERNAL MEDICINE | Age: 79
End: 2019-05-23
Payer: MEDICARE

## 2019-05-23 VITALS
DIASTOLIC BLOOD PRESSURE: 66 MMHG | SYSTOLIC BLOOD PRESSURE: 138 MMHG | HEART RATE: 100 BPM | BODY MASS INDEX: 34.23 KG/M2 | WEIGHT: 186 LBS | HEIGHT: 62 IN | TEMPERATURE: 97.9 F | OXYGEN SATURATION: 96 %

## 2019-05-23 DIAGNOSIS — N18.9 CHRONIC KIDNEY DISEASE, UNSPECIFIED CKD STAGE: ICD-10-CM

## 2019-05-23 DIAGNOSIS — Z09 HOSPITAL DISCHARGE FOLLOW-UP: Primary | ICD-10-CM

## 2019-05-23 DIAGNOSIS — N17.9 AKI (ACUTE KIDNEY INJURY) (HCC): ICD-10-CM

## 2019-05-23 LAB
ALBUMIN SERPL-MCNC: 3.9 G/DL (ref 3.5–5.2)
ALP BLD-CCNC: 66 U/L (ref 35–104)
ALT SERPL-CCNC: 11 U/L (ref 5–33)
ANION GAP SERPL CALCULATED.3IONS-SCNC: 11 MMOL/L (ref 7–19)
AST SERPL-CCNC: 14 U/L (ref 5–32)
BILIRUB SERPL-MCNC: 0.4 MG/DL (ref 0.2–1.2)
BUN BLDV-MCNC: 28 MG/DL (ref 8–23)
CALCIUM SERPL-MCNC: 9.6 MG/DL (ref 8.8–10.2)
CHLORIDE BLD-SCNC: 100 MMOL/L (ref 98–111)
CO2: 29 MMOL/L (ref 22–29)
CREAT SERPL-MCNC: 1 MG/DL (ref 0.5–0.9)
GFR NON-AFRICAN AMERICAN: 54
GLUCOSE BLD-MCNC: 104 MG/DL (ref 74–109)
POTASSIUM SERPL-SCNC: 4.1 MMOL/L (ref 3.5–5)
SODIUM BLD-SCNC: 140 MMOL/L (ref 136–145)
TOTAL PROTEIN: 6.9 G/DL (ref 6.6–8.7)

## 2019-05-23 PROCEDURE — 1111F DSCHRG MED/CURRENT MED MERGE: CPT | Performed by: NURSE PRACTITIONER

## 2019-05-23 PROCEDURE — 99496 TRANSJ CARE MGMT HIGH F2F 7D: CPT | Performed by: NURSE PRACTITIONER

## 2019-05-23 RX ORDER — BUMETANIDE 1 MG/1
1 TABLET ORAL DAILY
COMMUNITY
Start: 2019-05-18 | End: 2019-09-23 | Stop reason: SDUPTHER

## 2019-05-23 ASSESSMENT — ENCOUNTER SYMPTOMS
SHORTNESS OF BREATH: 0
NAUSEA: 0
RHINORRHEA: 0
COUGH: 0
BACK PAIN: 0
PHOTOPHOBIA: 0
COLOR CHANGE: 0
VOMITING: 0
VOICE CHANGE: 0

## 2019-05-23 NOTE — PROGRESS NOTES
Post-Discharge Transitional Care Management Services or Hospital Follow Up      Meredith Dwyer   YOB: 1940    Date of Office Visit:  5/23/2019  Date of Hospital Admission:  5/16/2019  Date of Hospital Discharge:  5/18/2019  Readmission Risk Score(high >=14%.  Medium >=10%):No data recorded    Care management risk score Rising risk (score 2-5) and Complex Care (Scores >=6): 1     Non face to face  following discharge, date last encounter closed (first attempt may have been earlier): 5/21/2019  3:42 PM 5/21/2019  3:42 PM    Call initiated 2 business days of discharge: Yes     Patient Active Problem List   Diagnosis    Trochanteric bursitis of both hips    Trochanteric bursitis of both hips    Lumbar facet arthropathy    Spinal stenosis at L4-L5 level    Hypothyroidism (acquired)    Hypertension    Mixed hyperlipidemia    Stage 3 chronic kidney disease ([address]    Fibrocystic breast disease    Chronic pain    Lumbar facet arthropathy    Adenocarcinoma of endometrium, stage 1 ([address]    Coronary artery disease involving native coronary artery of native heart without angina pectoris    Vitamin D deficiency    Other headache syndrome    Persistent headaches    Anxiety       Allergies   Allergen Reactions    Ambien [Zolpidem Tartrate]     Codeine     Lyrica [Pregabalin]     Morphine     Requip [Ropinirole Hcl]     Tizanidine      Terrible nightmares         Medications listed as ordered at the time of discharge from [facility]   [facility]ructions [facility]:    Printed on:05/23/19 153   Medication Information                      aspirin 81 MG tablet  Take 81 mg by mouth daily             atorvastatin (LIPITOR) 40 MG tablet  TAKE 1 TABLET DAILY AS DIRECTED (CHANGE PRIMARY CARE PHYSICIAN TO NILDA LINTON FOR ALL FUTURE REFILLS)             bumetanide (BUMEX) 1 MG tablet  Take 1 mg by mouth daily             carbidopa-levodopa (SINEMET)  MG per tablet  Take 1 tablet by mouth 4 times daily For restless legs and cramping             carvedilol (COREG) 12.5 MG tablet  TAKE 1 TABLET TWICE A DAY             cyclobenzaprine (FLEXERIL) 10 MG tablet  Take 1 tablet by mouth 2 times daily as needed for Muscle spasms 3 month supply             ergocalciferol (ERGOCALCIFEROL) 46395 units capsule  Take 1 capsule by mouth once a week             FIBER FORMULA PO  Take by mouth             lansoprazole (PREVACID) 30 MG delayed release capsule  TAKE 1 CAPSULE DAILY EVERY MORNING             levothyroxine (SYNTHROID) 50 MCG tablet  Take 1 tablet by mouth daily             loratadine (CLARITIN) 10 MG capsule  Take 10 mg by mouth daily             Omega-3 Fatty Acids (FISH OIL) 1000 MG CAPS  Take 2 capsules by mouth 2 times daily             Prenatal Multivit-Min-Fe-FA (PRENATAL 1 + IRON PO)  Take by mouth             SUMAtriptan (IMITREX) 50 MG tablet  Take 1 tablet by mouth 2 times daily as needed for Migraine (Max 2/day -- 3 month supply)                   Medications marked \"taking\" at this time  Outpatient Medications Marked as Taking for the 5/23/19 encounter (Office Visit) with TAMIA Boyd   Medication Sig Dispense Refill    bumetanide (BUMEX) 1 MG tablet Take 1 mg by mouth daily      atorvastatin (LIPITOR) 40 MG tablet TAKE 1 TABLET DAILY AS DIRECTED (CHANGE PRIMARY CARE PHYSICIAN TO NILDA LINTON FOR ALL FUTURE REFILLS) 90 tablet 2    ergocalciferol (ERGOCALCIFEROL) 50116 units capsule Take 1 capsule by mouth once a week 12 capsule 3    SUMAtriptan (IMITREX) 50 MG tablet Take 1 tablet by mouth 2 times daily as needed for Migraine (Max 2/day -- 3 month supply) 180 tablet 1    carvedilol (COREG) 12.5 MG tablet TAKE 1 TABLET TWICE A  tablet 1    cyclobenzaprine (FLEXERIL) 10 MG tablet Take 1 tablet by mouth 2 times daily as needed for Muscle spasms 3 month supply 180 tablet 1    Omega-3 Fatty Acids (FISH OIL) 1000 MG CAPS Take 2 capsules by mouth 2 times daily 180 capsule 2    levothyroxine (SYNTHROID) 50 MCG tablet Take 1 tablet by mouth daily 30 tablet 0    carbidopa-levodopa (SINEMET)  MG per tablet Take 1 tablet by mouth 4 times daily For restless legs and cramping 360 tablet 0    lansoprazole (PREVACID) 30 MG delayed release capsule TAKE 1 CAPSULE DAILY EVERY MORNING 90 capsule 3    Prenatal Multivit-Min-Fe-FA (PRENATAL 1 + IRON PO) Take by mouth      loratadine (CLARITIN) 10 MG capsule Take 10 mg by mouth daily      FIBER FORMULA PO Take by mouth      aspirin 81 MG tablet Take 81 mg by mouth daily          Medications patient taking as of now reconciled against medications ordered at time of hospital discharge: Yes    Chief Complaint   Patient presents with    Follow-Up from Hospital     Follow up for acute renal failure, Dc'd 5/18       HPI    Inpatient course: Discharge summary reviewed- see chart. Interval history/Current status:     Presents today for hospital discharge follow-up. Patient was admitted to [facility] for acute renal failure. Patient had initially come to the hospital for chest pain. She had negative cardiac markers. She did have a normal EKG. She underwent debridement stress which was low risk for ischemia. She had low blood pressure in the 80s on admission. She had DVT ruled out. Discontinue Cardizem because they felt it was causing fluid retention. She was stabilized and discharged. GFR at discharge was 45, it was 21 on arrival. Creatinine was 1.18 and was 2.26 on arrival. Blood pressure has stabilized. She was started on Bumex 1 mg daily; renal function is okay today but we will need to monitor closely as she has history of CKD and has not been on this medication in a very long time. She is doing daily weights and continues to lose. She reports the lower extremity edema has significantly decreased, still mildly present. She sees Dr Ney Pritchard and will scheduled follow-up with him.      Review of Systems   Constitutional: Negative for chills and fever. HENT: Negative for ear pain, hearing loss, rhinorrhea and voice change. Eyes: Negative for photophobia and visual disturbance. Respiratory: Negative for cough and shortness of breath. Cardiovascular: Negative for chest pain and palpitations. Gastrointestinal: Negative for nausea and vomiting. Endocrine: Negative. Negative for cold intolerance and heat intolerance. Genitourinary: Negative for difficulty urinating and flank pain. Musculoskeletal: Negative for back pain and neck pain. Skin: Negative for color change and rash. Allergic/Immunologic: Negative for environmental allergies and food allergies. Neurological: Negative for dizziness, speech difficulty and headaches. Hematological: Does not bruise/bleed easily. Psychiatric/Behavioral: Negative for sleep disturbance and suicidal ideas. Vitals:    05/23/19 1510   BP: 138/66   Site: Left Upper Arm   Position: Sitting   Pulse: 100   Temp: 97.9 °F (36.6 °C)   SpO2: 96%   Weight: 186 lb (84.4 kg)   Height: 5' 2\" (1.575 m)     Body mass index is 34.02 kg/m². Wt Readings from Last 3 Encounters:   05/23/19 186 lb (84.4 kg)   04/30/19 197 lb (89.4 kg)   04/17/19 199 lb (90.3 kg)     BP Readings from Last 3 Encounters:   05/23/19 138/66   04/30/19 132/82   04/17/19 (!) 148/100       Physical Exam   Constitutional: She is oriented to person, place, and time. She appears well-developed and well-nourished. HENT:   Head: Atraumatic. Right Ear: External ear normal.   Left Ear: External ear normal.   Nose: Nose normal.   Mouth/Throat: Oropharynx is clear and moist.   Eyes: Pupils are equal, round, and reactive to light. Conjunctivae are normal.   Neck: Normal range of motion. Neck supple. Cardiovascular: Normal rate, regular rhythm, S1 normal, S2 normal and normal heart sounds. Pulmonary/Chest: Effort normal and breath sounds normal.   Abdominal: Soft.  Bowel sounds are normal.   Musculoskeletal: Normal range of motion. Neurological: She is alert and oriented to person, place, and time. Skin: Skin is warm and dry. Psychiatric: She has a normal mood and affect. Her behavior is normal.   Nursing note and vitals reviewed. Lab Results   Component Value Date     05/23/2019    K 4.1 05/23/2019     05/23/2019    CO2 29 05/23/2019    BUN 28 (H) 05/23/2019    CREATININE 1.0 (H) 05/23/2019    GLUCOSE 104 05/23/2019    CALCIUM 9.6 05/23/2019    PROT 6.9 05/23/2019    LABALBU 3.9 05/23/2019    BILITOT 0.4 05/23/2019    ALKPHOS 66 05/23/2019    AST 14 05/23/2019    ALT 11 05/23/2019    LABGLOM 54 (A) 05/23/2019           Assessment/Plan:  1. Hospital discharge follow-up    - [place] DISCHARGE MEDS RECONCILED W/ CURRENT OUTPATIENT MED LIST    2. GUSTAVO (acute kidney injury) ([facility] [address]  - Labs are stable. Repeat CMP in 2 weeks. - Follow-up in 3 months with TAMIA Flowers. Medications reviewed and reconciled. Hospital records extensively reviewed. High complexity hospital follow-up visit.     Medical Decision Making: high complexity

## 2019-06-10 RX ORDER — LEVOTHYROXINE SODIUM 0.05 MG/1
TABLET ORAL
Qty: 90 TABLET | Refills: 3 | Status: SHIPPED | OUTPATIENT
Start: 2019-06-10 | End: 2020-01-22 | Stop reason: SDUPTHER

## 2019-06-24 RX ORDER — CYCLOBENZAPRINE HCL 10 MG
10 TABLET ORAL 2 TIMES DAILY PRN
Qty: 180 TABLET | Refills: 1 | Status: SHIPPED | OUTPATIENT
Start: 2019-06-24 | End: 2019-06-25 | Stop reason: SDUPTHER

## 2019-06-25 RX ORDER — CYCLOBENZAPRINE HCL 10 MG
10 TABLET ORAL 2 TIMES DAILY PRN
Qty: 30 TABLET | Refills: 0 | Status: SHIPPED | OUTPATIENT
Start: 2019-06-25 | End: 2019-07-05 | Stop reason: SDUPTHER

## 2019-06-26 ENCOUNTER — TELEPHONE (OUTPATIENT)
Dept: INTERNAL MEDICINE | Age: 79
End: 2019-06-26

## 2019-06-26 NOTE — TELEPHONE ENCOUNTER
Spoke with patient and she states Flexaril does help and she will call Friday morning for appointment

## 2019-07-05 RX ORDER — CYCLOBENZAPRINE HCL 10 MG
10 TABLET ORAL 2 TIMES DAILY PRN
Qty: 60 TABLET | Refills: 0 | Status: SHIPPED | OUTPATIENT
Start: 2019-07-05 | End: 2019-07-29

## 2019-07-16 ENCOUNTER — OFFICE VISIT (OUTPATIENT)
Dept: OBSTETRICS AND GYNECOLOGY | Facility: CLINIC | Age: 79
End: 2019-07-16

## 2019-07-16 VITALS
HEIGHT: 62 IN | BODY MASS INDEX: 36.8 KG/M2 | WEIGHT: 200 LBS | SYSTOLIC BLOOD PRESSURE: 146 MMHG | DIASTOLIC BLOOD PRESSURE: 78 MMHG

## 2019-07-16 DIAGNOSIS — Z12.39 BREAST CANCER SCREENING: ICD-10-CM

## 2019-07-16 DIAGNOSIS — F51.01 PRIMARY INSOMNIA: ICD-10-CM

## 2019-07-16 DIAGNOSIS — G43.709 CHRONIC MIGRAINE WITHOUT AURA WITHOUT STATUS MIGRAINOSUS, NOT INTRACTABLE: Primary | ICD-10-CM

## 2019-07-16 DIAGNOSIS — C54.1 ADENOCARCINOMA OF ENDOMETRIUM (HCC): ICD-10-CM

## 2019-07-16 DIAGNOSIS — E66.01 MORBIDLY OBESE (HCC): ICD-10-CM

## 2019-07-16 DIAGNOSIS — Z78.0 POSTMENOPAUSAL: ICD-10-CM

## 2019-07-16 DIAGNOSIS — F41.9 ANXIETY: ICD-10-CM

## 2019-07-16 PROCEDURE — G0101 CA SCREEN;PELVIC/BREAST EXAM: HCPCS | Performed by: OBSTETRICS & GYNECOLOGY

## 2019-07-16 RX ORDER — ALPRAZOLAM 0.5 MG/1
0.5 TABLET ORAL NIGHTLY PRN
Qty: 30 TABLET | Refills: 2 | Status: SHIPPED | OUTPATIENT
Start: 2019-07-16 | End: 2019-11-13 | Stop reason: SDUPTHER

## 2019-07-16 RX ORDER — SUMATRIPTAN 50 MG/1
50 TABLET, FILM COATED ORAL 2 TIMES DAILY PRN
Qty: 6 TABLET | Refills: 2 | Status: SHIPPED | OUTPATIENT
Start: 2019-07-16 | End: 2019-07-29 | Stop reason: SDUPTHER

## 2019-07-16 RX ORDER — ERGOCALCIFEROL 1.25 MG/1
50000 CAPSULE ORAL WEEKLY
COMMUNITY
Start: 2019-04-17

## 2019-07-16 NOTE — PROGRESS NOTES
"Subjective   Chief Complaint   Patient presents with   • Annual Exam     pt here today for annual exam. pt says that she is having trouble relaxing at night. pt wanting to discuss options.      Jennifer Gomes is a 78 y.o. year old  menopausal female presenting to be seen for her annual exam.  Overall, patient reports \"some good days and bad days\"  She wants to discuss sweating, sleeping and migraine headaches.  The patient normally has her PCP or Dr. Garcia write her headache medication, but she is out of Imitrex and would like a refill.  She specifically requests 1 mg xanax stating \"its the only thing that has ever helped me sleep\".  Patient describes just cat-napping on and off through the night because she is awakened so much by her pain.    The patient is status-post hysterectomy in 2017 with Dr. Miller for endometrial cancer.  She has already completed her pelvic radiation treatments (got done in 2018).    Hot flashes and night sweats are occasionally a significant problem, but she attributes them to medications.    She is not sexually active.     Patient reports regular self breast exams: no.  She exercises regularly: no.  She has noticed changes in height: yes, patient reports she has lost two inches in height    No Additional Complaints Reported    The following portions of the patient's history were reviewed and updated as appropriate:problem list, current medications, allergies, past family history, past medical history, past social history and past surgical history.  Social History    Tobacco Use      Smoking status: Never Smoker      Smokeless tobacco: Never Used    Review of Systems   Constitutional: Negative for activity change and unexpected weight change.   HENT: Negative for congestion.    Eyes: Positive for visual disturbance (wears glasses).   Respiratory: Negative for shortness of breath.    Cardiovascular: Negative for chest pain.   Gastrointestinal: Negative for abdominal pain, " "blood in stool, constipation and diarrhea.   Endocrine: Positive for heat intolerance (hot flashes).   Genitourinary: Negative for difficulty urinating, enuresis (used to be a problem, has since resolved), vaginal bleeding, vaginal discharge and vaginal pain.   Musculoskeletal: Positive for arthralgias, back pain and gait problem.   Skin: Negative for rash.   Neurological: Positive for headaches (takes Imitrex). Negative for dizziness.        No recent falls   Psychiatric/Behavioral: Positive for sleep disturbance. The patient is nervous/anxious.          Objective   /78   Ht 157.5 cm (62\")   Wt 90.7 kg (200 lb)   LMP  (LMP Unknown)   BMI 36.58 kg/m²   Physical Exam   Constitutional: She is oriented to person, place, and time. She appears well-developed and well-nourished. No distress.   HENT:   Head: Normocephalic and atraumatic.   Eyes: EOM are normal.   Neck: Normal range of motion. Neck supple. No thyromegaly present.   Cardiovascular: Normal rate and regular rhythm.   No murmur heard.  Pulmonary/Chest: Effort normal and breath sounds normal. Right breast exhibits no inverted nipple and no mass. Left breast exhibits no inverted nipple and no mass.   Abdominal: Soft. She exhibits no distension. There is no tenderness.   Genitourinary: Vagina normal. Rectal exam shows anal tone normal. No breast tenderness or discharge. Pelvic exam was performed with patient supine. There is no tenderness or lesion on the right labia. There is no tenderness or lesion on the left labia. Right adnexum displays no tenderness and no fullness. Left adnexum displays no tenderness and no fullness. No bleeding in the vagina. No vaginal discharge found.   Genitourinary Comments: Patient s/p hysterectomy:  Uterus and cervix surgically absent.  Vaginal cuff unremarkable.  Labia normal, no lesions noted.  Urethral meatus unremarkable, no prolapse of mucosa.  Anus/perineum normal   Musculoskeletal:   Mobility limited by chronic back " "pain.  She is now ambulating with two canes   Neurological: She is alert and oriented to person, place, and time.   Skin: Skin is warm and dry.   Psychiatric: She has a normal mood and affect. Her behavior is normal. Judgment normal.   Nursing note and vitals reviewed.         Assessment & Plan      Jennifer was seen today for follow-up.    Diagnoses and all orders for this visit:      Annual exam: Routine screening labs per PCP.  Patient encouraged to do regular SBE.  Mammogram and DEXA normal in 2018; new mammogram ordered for this year.  Exam remarkable for obesity and for chronic back pain that limits the patient's mobility.  Colonoscopy not due until 2027.    Encounter for screening mammogram for breast cancer: Patient with abnormal mammogram in 2017, biopsy showed fibrocystic changes.  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  Patient encouraged to use vaginal dilators to allow continued surveillance exams.  She still reports that this seems \"awkward\" to her.  S/P hysterectomy with oophorectomy    BMI 37.0-37.9, adult    History of radiation therapy: Has resulted in narrowing of vagina that makes examination difficult.  Patient can barely tolerate insertion of small speculum.    Non-smoker    Spinal stenosis, lumbar region, without neurogenic claudication    Anxiety and insomnia: Patient specifically requests 1 mg xanax dose to take every night at bedtime.  Patient reports that this is the only thing she has ever tried that helped her get some rest.  I sent 0.5 mg dose, and we will discuss again at her next visit.  Patient advised that 1 mg dose may not be safe.    Migraine Headaches: Patient has used Imitrex " for years and is currently out of medication.  She asked that several pills be sent to pharmacy since she is out.      This note was electronically signed.    Shasta Madrigal MD  7/16/2019  11:16 AM

## 2019-07-17 PROBLEM — E66.01 MORBIDLY OBESE: Status: ACTIVE | Noted: 2019-07-17

## 2019-07-25 ENCOUNTER — TELEPHONE (OUTPATIENT)
Dept: OBSTETRICS AND GYNECOLOGY | Facility: CLINIC | Age: 79
End: 2019-07-25

## 2019-07-25 NOTE — TELEPHONE ENCOUNTER
Patient calls today with c/o of a migraine and is out of her Imitrex. Patient requesting refill on Imitrex to be sent to [facility]. Patient states that she still has not received the prescription from [facility]. I do not see that we have sent this in. Is this coming from her GP?

## 2019-07-29 ENCOUNTER — LAB (OUTPATIENT)
Dept: LAB | Facility: HOSPITAL | Age: 79
End: 2019-07-29

## 2019-07-29 ENCOUNTER — TELEPHONE (OUTPATIENT)
Dept: INTERNAL MEDICINE | Age: 79
End: 2019-07-29

## 2019-07-29 ENCOUNTER — OFFICE VISIT (OUTPATIENT)
Dept: NEUROLOGY | Facility: CLINIC | Age: 79
End: 2019-07-29

## 2019-07-29 VITALS
BODY MASS INDEX: 33.13 KG/M2 | DIASTOLIC BLOOD PRESSURE: 70 MMHG | WEIGHT: 180 LBS | OXYGEN SATURATION: 98 % | HEIGHT: 62 IN | SYSTOLIC BLOOD PRESSURE: 144 MMHG | HEART RATE: 97 BPM

## 2019-07-29 DIAGNOSIS — R41.82 ALTERED MENTAL STATUS, UNSPECIFIED ALTERED MENTAL STATUS TYPE: ICD-10-CM

## 2019-07-29 DIAGNOSIS — M48.061 SPINAL STENOSIS, LUMBAR REGION, WITHOUT NEUROGENIC CLAUDICATION: ICD-10-CM

## 2019-07-29 DIAGNOSIS — R41.82 ALTERED MENTAL STATUS, UNSPECIFIED ALTERED MENTAL STATUS TYPE: Primary | ICD-10-CM

## 2019-07-29 DIAGNOSIS — G43.709 CHRONIC MIGRAINE WITHOUT AURA WITHOUT STATUS MIGRAINOSUS, NOT INTRACTABLE: ICD-10-CM

## 2019-07-29 LAB
ALBUMIN SERPL-MCNC: 4.1 G/DL (ref 3.5–5)
ALBUMIN/GLOB SERPL: 1.3 G/DL (ref 1.1–2.5)
ALP SERPL-CCNC: 72 U/L (ref 24–120)
ALT SERPL W P-5'-P-CCNC: <15 U/L (ref 0–54)
AMMONIA BLD-SCNC: <9 UMOL/L (ref 9–33)
ANION GAP SERPL CALCULATED.3IONS-SCNC: 7 MMOL/L (ref 4–13)
AST SERPL-CCNC: 22 U/L (ref 7–45)
BASOPHILS # BLD AUTO: 0.02 10*3/MM3 (ref 0–0.2)
BASOPHILS NFR BLD AUTO: 0.4 % (ref 0–2)
BILIRUB SERPL-MCNC: 0.4 MG/DL (ref 0.1–1)
BUN BLD-MCNC: 34 MG/DL (ref 5–21)
BUN/CREAT SERPL: 33 (ref 7–25)
CALCIUM SPEC-SCNC: 10 MG/DL (ref 8.4–10.4)
CHLORIDE SERPL-SCNC: 101 MMOL/L (ref 98–110)
CO2 SERPL-SCNC: 28 MMOL/L (ref 24–31)
CREAT BLD-MCNC: 1.03 MG/DL (ref 0.5–1.4)
DEPRECATED RDW RBC AUTO: 43.5 FL (ref 40–54)
EOSINOPHIL # BLD AUTO: 0.13 10*3/MM3 (ref 0–0.7)
EOSINOPHIL NFR BLD AUTO: 2.6 % (ref 0–4)
ERYTHROCYTE [DISTWIDTH] IN BLOOD BY AUTOMATED COUNT: 13.6 % (ref 12–15)
FOLATE SERPL-MCNC: >20 NG/ML (ref 4.78–24.2)
GFR SERPL CREATININE-BSD FRML MDRD: 52 ML/MIN/1.73
GLOBULIN UR ELPH-MCNC: 3.1 GM/DL
GLUCOSE BLD-MCNC: 103 MG/DL (ref 70–100)
HCT VFR BLD AUTO: 30.1 % (ref 37–47)
HGB BLD-MCNC: 10 G/DL (ref 12–16)
IMM GRANULOCYTES # BLD AUTO: 0.02 10*3/MM3 (ref 0–0.05)
IMM GRANULOCYTES NFR BLD AUTO: 0.4 % (ref 0–5)
LYMPHOCYTES # BLD AUTO: 0.96 10*3/MM3 (ref 0.72–4.86)
LYMPHOCYTES NFR BLD AUTO: 19.2 % (ref 15–45)
MAGNESIUM SERPL-MCNC: 1.9 MG/DL (ref 1.4–2.2)
MCH RBC QN AUTO: 29.2 PG (ref 28–32)
MCHC RBC AUTO-ENTMCNC: 33.2 G/DL (ref 33–36)
MCV RBC AUTO: 88 FL (ref 82–98)
MONOCYTES # BLD AUTO: 0.47 10*3/MM3 (ref 0.19–1.3)
MONOCYTES NFR BLD AUTO: 9.4 % (ref 4–12)
NEUTROPHILS # BLD AUTO: 3.39 10*3/MM3 (ref 1.87–8.4)
NEUTROPHILS NFR BLD AUTO: 68 % (ref 39–78)
NRBC BLD AUTO-RTO: 0 /100 WBC (ref 0–0.2)
PLATELET # BLD AUTO: 154 10*3/MM3 (ref 130–400)
PMV BLD AUTO: 11.3 FL (ref 6–12)
POTASSIUM BLD-SCNC: 3.9 MMOL/L (ref 3.5–5.3)
PROT SERPL-MCNC: 7.2 G/DL (ref 6.3–8.7)
RBC # BLD AUTO: 3.42 10*6/MM3 (ref 4.2–5.4)
SODIUM BLD-SCNC: 136 MMOL/L (ref 135–145)
TSH SERPL DL<=0.05 MIU/L-ACNC: 5.52 MIU/ML (ref 0.47–4.68)
VIT B12 BLD-MCNC: 320 PG/ML (ref 239–931)
WBC NRBC COR # BLD: 4.99 10*3/MM3 (ref 4.8–10.8)

## 2019-07-29 PROCEDURE — 36415 COLL VENOUS BLD VENIPUNCTURE: CPT

## 2019-07-29 PROCEDURE — 82140 ASSAY OF AMMONIA: CPT | Performed by: CLINICAL NURSE SPECIALIST

## 2019-07-29 PROCEDURE — 99214 OFFICE O/P EST MOD 30 MIN: CPT | Performed by: CLINICAL NURSE SPECIALIST

## 2019-07-29 PROCEDURE — 83921 ORGANIC ACID SINGLE QUANT: CPT | Performed by: CLINICAL NURSE SPECIALIST

## 2019-07-29 PROCEDURE — 85025 COMPLETE CBC W/AUTO DIFF WBC: CPT | Performed by: CLINICAL NURSE SPECIALIST

## 2019-07-29 PROCEDURE — 80053 COMPREHEN METABOLIC PANEL: CPT | Performed by: CLINICAL NURSE SPECIALIST

## 2019-07-29 PROCEDURE — 84207 ASSAY OF VITAMIN B-6: CPT | Performed by: CLINICAL NURSE SPECIALIST

## 2019-07-29 PROCEDURE — 82746 ASSAY OF FOLIC ACID SERUM: CPT | Performed by: CLINICAL NURSE SPECIALIST

## 2019-07-29 PROCEDURE — 82607 VITAMIN B-12: CPT | Performed by: CLINICAL NURSE SPECIALIST

## 2019-07-29 PROCEDURE — 84443 ASSAY THYROID STIM HORMONE: CPT | Performed by: CLINICAL NURSE SPECIALIST

## 2019-07-29 PROCEDURE — 83735 ASSAY OF MAGNESIUM: CPT | Performed by: CLINICAL NURSE SPECIALIST

## 2019-07-29 RX ORDER — CYCLOBENZAPRINE HCL 10 MG
10 TABLET ORAL 3 TIMES DAILY PRN
Qty: 90 TABLET | Refills: 0 | Status: SHIPPED | OUTPATIENT
Start: 2019-07-29 | End: 2019-09-11 | Stop reason: SDUPTHER

## 2019-07-29 RX ORDER — SUMATRIPTAN 50 MG/1
50 TABLET, FILM COATED ORAL 2 TIMES DAILY PRN
Qty: 6 TABLET | Refills: 2 | Status: SHIPPED | OUTPATIENT
Start: 2019-07-29 | End: 2019-07-29 | Stop reason: SDUPTHER

## 2019-07-29 RX ORDER — SUMATRIPTAN 50 MG/1
50 TABLET, FILM COATED ORAL 2 TIMES DAILY PRN
Qty: 30 TABLET | Refills: 3 | Status: SHIPPED | OUTPATIENT
Start: 2019-07-29

## 2019-07-29 NOTE — TELEPHONE ENCOUNTER
Patient called stating she would like Flexeril increased to TID due to increased back pain.   Please advise

## 2019-07-29 NOTE — PROGRESS NOTES
Subjective     Chief Complaint   Patient presents with   • Neurologic Problem       Jennifer Gomes is a 78 y.o. female right handed retiree, middle school science and . She is here today for hospital follow up for AMS. She is by herself. She has hx of ovarian cancer, lumbar spinal stenosis managed by pain management, HTN, HLD, CKD and TOMÁS. She also has chronic HA and does take PRN imitrex. She is ambulating with 2 canes. She was hospitalized 4.2019 with AMS and uncertain of etiology but thought to be related to metabolic encephalopathy and opioid use. Patient is not able to provide much information about that event, only what was told to her. She states she had not had further episodes since. She was hospitalized 5/2019 with TOMÁS. She does take oxycodone 15 mg 4-5 times daily along with flexeril 10 mg  BID. She had seen Dr. KELLI Lucio and most recently Dr. Miller. Patient declines surgical intervention as uncertain if surgery would improve her pain. Although patient states no episodes of confusion she does admit to short term memory issues. Years ago she would forget to pay a certain and subsequently now has automatic withdrawal. She drives very little (the last time was June 2019). She denies getting lost. At some point, she was having what sounds to be RLS and PCP prescribed sinemet 25/100 QID. Stating this was prescribed as providers were trying to decrease the amount of pain medications. She was to have EEG as outpatient after discharge but did not return scheduling call. UDS 4/12/19  positive for opiates and benzodiazepines even though the patient has no prescription for benzodiazepines. Per Dr. Brady note no MRI brain warranted as exam did not indicate CNS lesion. She did have CT head and MRI lumbar with results below. She is seeing nephrology group for management of CKD.        4/12/19: Patient previously evaluated by Dr. Raoul Lucio in neurosurgery in August 2017.  She was found to have L4-L5  central canal stenosis which was very significant at the time.  His initial recommendations was for surgical fixation.  Subsequent films and a subsequent visit with the family and the patient they elected not to proceed with surgery.  Per the patient's description it was because Dr. Lucio concluded that the surgery would likely not offer significant pain relief.  The patient has been on significant amount of pain medications.  She took oxycodone 15 mg tablets up to 6 times per day.  Recently she has been taken over by Dr. maher in the pain management group.  As of 4/2019 He has decreased her oxycodone down to 2 times per day.  She is also on Sinemet therapy.  She has no history of Parkinson's disease.  She may have a component of restless leg syndrome.  She told Dr. Brady that the indication for starting Sinemet was by her primary care physician.  At the time she was having radicular pain shooting down both legs from her back.  Her primary care physician was attempting to wean off the oxycodone.  He started Sinemet at that time to help with the pain.  She believes that it helped with the pain more than any other medication she takes.     She has no history of seizure disorder.  She has no history of head trauma.  The main indications for 4/2019 neurology consultation is that her family tells me that she has spells where she has decreased levels of consciousness.  They give the example that recently she was in a car.  They asked her to get out of the car.  She refused to go the car and sat in the car nonresponding to her family.  No seizure activity was seen.  She had no tongue biting and no incontinence.  The patient recalls the event greatly.  She tells me that she is often daydreaming during these events and just does not want to answer her family.  She recalls the event in great detail.  She tells me these events are quite common and she believes some of them are secondary to overuse of her medications.  She  admited to this.  The duration of these can be for several minutes to several hours.             Altered Mental Status   This is a chronic problem. Episode onset: 4/12/19. The problem has been resolved. Associated symptoms include headaches. Pertinent negatives include no arthralgias, chest pain, fatigue, nausea or weakness. Associated symptoms comments: Chronic pain and opioid use.. Exacerbated by: TOMÁS,         Current Outpatient Medications   Medication Sig Dispense Refill   • ALPRAZolam (XANAX) 0.5 MG tablet Take 1 tablet by mouth At Night As Needed for Anxiety. 30 tablet 2   • bumetanide (BUMEX) 1 MG tablet Take 1 tablet by mouth Daily. 15 tablet 0   • carbidopa-levodopa (SINEMET)  MG per tablet Take 1 tablet by mouth 4 (Four) Times a Day.     • cyclobenzaprine (FLEXERIL) 10 MG tablet Take 10 mg by mouth 2 (Two) Times a Day As Needed for Muscle Spasms.     • ergocalciferol (ERGOCALCIFEROL) 83669 units capsule Take 50,000 Units by mouth.     • lansoprazole (PREVACID) 30 MG capsule Take 30 mg by mouth Daily.     • levothyroxine (SYNTHROID, LEVOTHROID) 50 MCG tablet Take 50 mcg by mouth Every Morning.     • oxyCODONE HCl 15 MG tablet  Take 15 mg by mouth 2 (Two) Times a Day.     • SUMAtriptan (IMITREX) 50 MG tablet Take 1 tablet by mouth 2 (Two) Times a Day As Needed for Migraine. 6 tablet 2     No current facility-administered medications for this visit.        Past Medical History:   Diagnosis Date   • Anxiety    • Arthritis    • Cancer (CMS/HCC)     uterine   • Chronic pain    • Depression    • Disease of thyroid gland    • Fibromyalgia    • Headache    • Hyperlipidemia    • Hypertension    • Incontinence    • Insomnia    • Leg pain    • Lumbar stenosis    • Peptic ulcer    • Restless legs    • Vaginal bleeding        Past Surgical History:   Procedure Laterality Date   • BLADDER REPAIR  2011    MESH HAD TO BE REMOVED IN 2013   • BREAST BIOPSY Right 2017    benign   • BREAST CYST EXCISION Left 1982   •  CARDIAC CATHETERIZATION     • CARPAL TUNNEL RELEASE     • CATARACT EXTRACTION W/ INTRAOCULAR LENS  IMPLANT, BILATERAL     • CYSTECTOMY     • D&C HYSTEROSCOPY N/A 11/6/2017    Procedure: DILATATION AND CURETTAGE HYSTEROSCOPY;  Surgeon: Shasta Madrigal MD;  Location: [facility] OR;  Service:    • DILATION AND CURETTAGE, DIAGNOSTIC / THERAPEUTIC  2008   • ENDOSCOPY  09/23/2010    Short segment of Arriola's,Moderate chroninc esophagogastritis and negative H.pylori   • ENDOSCOPY N/A 9/25/2017    Procedure: ESOPHAGOGASTRODUODENOSCOPY WITH ANESTHESIA;  Surgeon: Tom Velasco DO;  Location: [facility] ENDOSCOPY;  Service:    • HYSTERECTOMY  12/20/2017   • ORIF TIBIA/FIBULA FRACTURES Left 2000   • TRANSVAGINAL TAPING SUSPENSION N/A 11/6/2017    Procedure: VAGINAL MESH REVISION;  Surgeon: Shasta Madrigal MD;  Location: [facility] OR;  Service:    • VAGINAL MESH REVISION  2013       family history includes Cancer in her paternal grandmother; Diabetes in her mother and sister; Lung cancer in her paternal grandfather; Lymphoma in her brother; Multiple myeloma in her mother; Ovarian cancer in her paternal aunt; Prostate cancer in her brother.    Social History     Tobacco Use   • Smoking status: Never Smoker   • Smokeless tobacco: Never Used   Substance Use Topics   • Alcohol use: No   • Drug use: No       Review of Systems   Constitutional: Negative.  Negative for fatigue.   HENT: Negative for rhinorrhea and trouble swallowing.    Eyes: Negative.    Respiratory: Positive for shortness of breath. Negative for apnea.    Cardiovascular: Positive for leg swelling (bilateral, right more than left). Negative for chest pain.   Gastrointestinal: Negative.  Negative for constipation, diarrhea and nausea.   Endocrine: Negative.    Genitourinary: Positive for dysuria and frequency.   Musculoskeletal: Positive for back pain and gait problem. Negative for arthralgias.   Skin: Negative.    Allergic/Immunologic: Negative.    Neurological: Positive for  "headaches. Negative for dizziness, speech difficulty and weakness.   Hematological: Negative.    Psychiatric/Behavioral: Negative for confusion and hallucinations.   All other systems reviewed and are negative.      Objective     /70 (BP Location: Left arm, Patient Position: Sitting, Cuff Size: Adult)   Pulse 97   Ht 157.5 cm (62\")   Wt 81.6 kg (180 lb)   LMP  (LMP Unknown)   SpO2 98%   BMI 32.92 kg/m² , Body mass index is 32.92 kg/m².    Physical Exam   Constitutional: She is oriented to person, place, and time. Vital signs are normal. She appears well-developed and well-nourished. She is cooperative.   HENT:   Head: Normocephalic and atraumatic.   Right Ear: Hearing and external ear normal.   Left Ear: Hearing and external ear normal.   Nose: Nose normal.   Mouth/Throat: Oropharynx is clear and moist.   Eyes: Conjunctivae, EOM and lids are normal. Pupils are equal, round, and reactive to light. Right eye exhibits normal extraocular motion and no nystagmus. Left eye exhibits normal extraocular motion and no nystagmus. Right pupil is round and reactive. Left pupil is round and reactive. Pupils are equal.   Neck: Normal range of motion. Neck supple. Carotid bruit is not present.   Cardiovascular: Normal rate, regular rhythm and normal heart sounds.   No murmur heard.  Pulmonary/Chest: Effort normal and breath sounds normal. She has no decreased breath sounds.   Abdominal: Soft. Bowel sounds are normal.   Musculoskeletal: Normal range of motion.     Vascular Status -  Her right foot exhibits abnormal foot edema. Her left foot exhibits abnormal foot edema.  Neurological: She is alert and oriented to person, place, and time. She has normal strength. She displays no tremor. No cranial nerve deficit. She exhibits normal muscle tone. She displays a negative Romberg sign. Gait (kyphotic, imbalance. uses 2 canes) abnormal. Coordination (no ataxia, FTN, HTS intact bilateral) normal.   Reflex Scores:       Tricep " reflexes are 2+ on the right side and 2+ on the left side.       Bicep reflexes are 2+ on the right side and 2+ on the left side.       Brachioradialis reflexes are 2+ on the right side and 2+ on the left side.       Patellar reflexes are 1+ on the right side and 1+ on the left side.       Achilles reflexes are 1+ on the right side and 1+ on the left side.  Awake, alert. No aphasia, no dysarthria  Completes simple and complex commands  MMSE 29/39    COMPLETES 3/3 COMPLEX COMMANDS    CN II:  Visual fields full.  Pupils equally reactive to light  CN III, IV, VI:  Extraocular Muscles full with no signs of nystagmus  CN V:  Facial sensory is symmetric with no asymetries.  CN VII:  Facial motor symmetric  CN VIII:  Gross hearing intact bilaterally  CN IX:  Palate elevates symmetrically  CN X:  Palate elevates symmetrically  CN XI:  Shoulder shrug symmetric  CN XII:  Tongue is midline on protrusion     symmetric strength bilateral upper and lower extremities.   Skin: Skin is warm and dry.   Psychiatric: She has a normal mood and affect. Her speech is normal and behavior is normal. Cognition and memory are normal.   Nursing note and vitals reviewed.      Results for orders placed or performed during the hospital encounter of 05/16/19   Eosinophil Smear - Urine, Urine, Clean Catch   Result Value Ref Range    Eosinophil, Urine No Eosinophils Seen %   Comprehensive Metabolic Panel   Result Value Ref Range    Glucose 128 (H) 70 - 100 mg/dL    BUN 42 (H) 5 - 21 mg/dL    Creatinine 2.93 (H) 0.50 - 1.40 mg/dL    Sodium 134 (L) 135 - 145 mmol/L    Potassium 4.7 3.5 - 5.3 mmol/L    Chloride 97 (L) 98 - 110 mmol/L    CO2 25.0 24.0 - 31.0 mmol/L    Calcium 9.7 8.4 - 10.4 mg/dL    Total Protein 7.3 6.3 - 8.7 g/dL    Albumin 4.50 3.50 - 5.00 g/dL    ALT (SGPT) 19 0 - 54 U/L    AST (SGOT) 32 7 - 45 U/L    Alkaline Phosphatase 74 24 - 120 U/L    Total Bilirubin 0.6 0.1 - 1.0 mg/dL    eGFR Non African Amer 16 (L) >60 mL/min/1.73     Globulin 2.8 gm/dL    A/G Ratio 1.6 1.1 - 2.5 g/dL    BUN/Creatinine Ratio 14.3 7.0 - 25.0    Anion Gap 12.0 4.0 - 13.0 mmol/L   Troponin   Result Value Ref Range    Troponin I <0.012 0.000 - 0.034 ng/mL   Troponin   Result Value Ref Range    Troponin I <0.012 0.000 - 0.034 ng/mL   CBC Auto Differential   Result Value Ref Range    WBC 11.02 (H) 4.80 - 10.80 10*3/mm3    RBC 3.60 (L) 4.20 - 5.40 10*6/mm3    Hemoglobin 10.6 (L) 12.0 - 16.0 g/dL    Hematocrit 31.4 (L) 37.0 - 47.0 %    MCV 87.2 82.0 - 98.0 fL    MCH 29.4 28.0 - 32.0 pg    MCHC 33.8 33.0 - 36.0 g/dL    RDW 13.5 12.0 - 15.0 %    RDW-SD 42.3 40.0 - 54.0 fl    MPV 10.9 6.0 - 12.0 fL    Platelets 229 130 - 400 10*3/mm3    Neutrophil % 66.5 39.0 - 78.0 %    Lymphocyte % 18.2 15.0 - 45.0 %    Monocyte % 11.5 4.0 - 12.0 %    Eosinophil % 3.0 0.0 - 4.0 %    Basophil % 0.4 0.0 - 2.0 %    Immature Grans % 0.4 0.0 - 5.0 %    Neutrophils, Absolute 7.33 1.87 - 8.40 10*3/mm3    Lymphocytes, Absolute 2.01 0.72 - 4.86 10*3/mm3    Monocytes, Absolute 1.27 0.19 - 1.30 10*3/mm3    Eosinophils, Absolute 0.33 0.00 - 0.70 10*3/mm3    Basophils, Absolute 0.04 0.00 - 0.20 10*3/mm3    Immature Grans, Absolute 0.04 0.00 - 0.05 10*3/mm3    nRBC 0.0 0.0 - 0.2 /100 WBC   CK   Result Value Ref Range    Creatine Kinase 138 0 - 203 U/L   Myoglobin, Serum   Result Value Ref Range    Myoglobin 374.7 (H) 0.0 - 110.0 ng/mL   CBC (No Diff)   Result Value Ref Range    WBC 5.34 4.80 - 10.80 10*3/mm3    RBC 3.11 (L) 4.20 - 5.40 10*6/mm3    Hemoglobin 9.0 (L) 12.0 - 16.0 g/dL    Hematocrit 27.5 (L) 37.0 - 47.0 %    MCV 88.4 82.0 - 98.0 fL    MCH 28.9 28.0 - 32.0 pg    MCHC 32.7 (L) 33.0 - 36.0 g/dL    RDW 13.5 12.0 - 15.0 %    RDW-SD 42.8 40.0 - 54.0 fl    MPV 11.9 6.0 - 12.0 fL    Platelets 172 130 - 400 10*3/mm3   Basic Metabolic Panel   Result Value Ref Range    Glucose 103 (H) 70 - 100 mg/dL    BUN 36 (H) 5 - 21 mg/dL    Creatinine 2.00 (H) 0.50 - 1.40 mg/dL    Sodium 136 135 - 145 mmol/L     Potassium 4.2 3.5 - 5.3 mmol/L    Chloride 106 98 - 110 mmol/L    CO2 24.0 24.0 - 31.0 mmol/L    Calcium 9.0 8.4 - 10.4 mg/dL    eGFR Non African Amer 24 (L) >60 mL/min/1.73    BUN/Creatinine Ratio 18.0 7.0 - 25.0    Anion Gap 6.0 4.0 - 13.0 mmol/L   Hemoglobin A1c   Result Value Ref Range    Hemoglobin A1C 4.9 %   Lipid Panel   Result Value Ref Range    Total Cholesterol 157 130 - 200 mg/dL    Triglycerides 159 (H) 0 - 149 mg/dL    HDL Cholesterol 32 (L) >=50 mg/dL    LDL Cholesterol  96 0 - 99 mg/dL    LDL/HDL Ratio 2.91    TSH   Result Value Ref Range    TSH 1.750 0.470 - 4.680 mIU/mL   Basic Metabolic Panel   Result Value Ref Range    Glucose 92 70 - 100 mg/dL    BUN 24 (H) 5 - 21 mg/dL    Creatinine 1.18 0.50 - 1.40 mg/dL    Sodium 139 135 - 145 mmol/L    Potassium 4.2 3.5 - 5.3 mmol/L    Chloride 108 98 - 110 mmol/L    CO2 25.0 24.0 - 31.0 mmol/L    Calcium 9.2 8.4 - 10.4 mg/dL    eGFR Non African Amer 45 (L) >60 mL/min/1.73    BUN/Creatinine Ratio 20.3 7.0 - 25.0    Anion Gap 6.0 4.0 - 13.0 mmol/L   Adult Transthoracic Echo Complete W/ Cont if Necessary Per Protocol   Result Value Ref Range    BSA 1.9 m^2    IVSd 1.4 cm    LVIDd 3.5 cm    LVIDs 2.0 cm    LVPWd 1.3 cm    IVS/LVPW 1.1     FS 41.8 %    EDV(Teich) 49.8 ml    ESV(Teich) 13.1 ml    EF(Teich) 73.8 %    EDV(cubed) 41.8 ml    ESV(cubed) 8.2 ml    EF(cubed) 80.3 %    LV mass(C)d 153.8 grams    LV mass(C)dI 80.1 grams/m^2    SV(Teich) 36.8 ml    SI(Teich) 19.2 ml/m^2    SV(cubed) 33.5 ml    SI(cubed) 17.5 ml/m^2    Ao root diam 3.3 cm    Ao root area 8.6 cm^2    LVOT diam 1.7 cm    LVOT area 2.3 cm^2    LVOT area(traced) 2.3 cm^2    LVLd ap4 6.9 cm    EDV(MOD-sp4) 66.7 ml    LVLs ap4 5.5 cm    ESV(MOD-sp4) 14.6 ml    EF(MOD-sp4) 78.1 %    SV(MOD-sp4) 52.1 ml    SI(MOD-sp4) 27.1 ml/m^2    Ao root area (BSA corrected) 1.7     LV Jasso Vol (BSA corrected) 34.7 ml/m^2    LV Sys Vol (BSA corrected) 7.6 ml/m^2    MV E max price 146.0 cm/sec    MV A max price 171.0  cm/sec    MV E/A 0.85     MV dec time 0.16 sec    Ao pk rajiv 180.0 cm/sec    Ao max PG 13.0 mmHg    Ao max PG (full) 2.6 mmHg    Ao V2 mean 140.0 cm/sec    Ao mean PG 9.0 mmHg    Ao mean PG (full) 2.0 mmHg    Ao V2 VTI 42.3 cm    LYSSA(I,A) 2.0 cm^2    LYSSA(I,D) 2.0 cm^2    LYSSA(V,A) 2.0 cm^2    LYSSA(V,D) 2.0 cm^2    LV V1 max PG 10.4 mmHg    LV V1 mean PG 7.0 mmHg    LV V1 max 161.0 cm/sec    LV V1 mean 124.0 cm/sec    LV V1 VTI 37.4 cm    SV(Ao) 361.8 ml    SI(Ao) 188.5 ml/m^2    SV(LVOT) 84.9 ml    SI(LVOT) 44.2 ml/m^2    TR max rajiv 361.0 cm/sec    RVSP(TR) 57.1 mmHg    RAP systole 5.0 mmHg     CV ECHO CEM - BZI_BMI 36.9 kilograms/m^2     CV ECHO CEM - BSA(HAYCOCK) 2.0 m^2     CV ECHO CEM - BZI_METRIC_WEIGHT 91.6 kg     CV ECHO CEM - BZI_METRIC_HEIGHT 157.5 cm    Target HR (85%) 122 bpm    Max. Pred. HR (100%) 144 bpm    LA volume 84.8 cm3    LA Volume Index 44.2 mL/m2    Avg E/e' ratio 7.72     Lat Peak E' Rajiv 14.3 cm/sec    Med Peak E' Rajiv 23.50 cm/sec    Echo EF Estimated 65 %   Light Blue Top   Result Value Ref Range    Extra Tube hold for add-on    Green Top (Gel)   Result Value Ref Range    Extra Tube Hold for add-ons.    Lavender Top   Result Value Ref Range    Extra Tube hold for add-on    Red Top   Result Value Ref Range    Extra Tube Hold for add-ons.    Adult Stress Echo W/ Cont or Stress Agent if Necessary Per Protocol   Result Value Ref Range     CV STRESS PROTOCOL 1 Pharmacologic     Stage 1 1     HR Stage 1 92     BP Stage 1 144/55     Duration Min Stage 1 3     Duration Sec Stage 1 0     Stress Dose Dobutamine Stage 1 10     Stage 2 2     HR Stage 2 94     BP Stage 2 159/50     Duration Min Stage 2 3     Duration Sec Stage 2 0     Stress Dose Dobutamine Stage 2 20     Stage 3 3     HR Stage 3 124     BP Stage 3 72/57     Duration Min Stage 3 3     Duration Sec Stage 3 56     Stress Dose Dobutamine Stage 3 30     BH CV STRESS  - ATROPINE STAGE 3 1.00     Baseline HR 92 bpm    Baseline BP  153/79 mmHg    Peak  bpm    Peak /57 mmHg    Exercise duration (min) 9 min    Exercise duration (sec) 56 sec    Percent Target  %    Percent Max Pred HR 86.11 %    Recovery  bpm    Recovery /54 mmHg    Target HR (85%) 122 bpm    Max. Pred. HR (100%) 144 bpm    Echo EF Estimated 60 %    PostStressEF 65 %      CT HEAD:    IMPRESSION:  1. No hemorrhage, edema or mass effect.  2. Mild atrophy with associated ventricular prominence.  3. Low density in the white matter is nonspecific and likely due to  chronic small vessel. Vascular calcification is noted.    MRI LUMBAR:  IMPRESSION:  1. Degenerative disc disease with bulging of the disc is noted at  multiple levels. This combined with facet and ligamentous hypertrophy  contributes to central and foraminal stenosis as described above.  2. The most significant finding is at the L4-L5 level where there is  severe facet arthropathy and ligamentous hypertrophy. There is also  broad-based bulging of the disc resulting in central stenosis which is  severe in nature. Moderate bilateral neural foraminal narrowing is also  present. There is grade 1 anterolisthesis of L4 upon L5 secondary to  subluxation at the level of the facets..          ASSESSMENT/PLAN    Diagnoses and all orders for this visit:    Altered mental status, unspecified altered mental status type  -     EEG (Hospital Performed); Future  -     Ambulatory Referral to Speech Therapy  -     Vitamin B12; Future  -     Folate; Future  -     Methylmalonic Acid, Serum; Future  -     Vitamin B6 (Pyridoxine); Future  -     Magnesium; Future  -     TSH; Future  -     CBC & Differential; Future  -     Comprehensive Metabolic Panel; Future  -     Ammonia; Future    Spinal stenosis, lumbar region, without neurogenic claudication    Other orders  -     carbidopa-levodopa (SINEMET)  MG per tablet; Take 1 tablet by mouth 4 (Four) Times a Day.      MEDICAL DECISION MAKIN. Check additional  labs  2. Check EEG  3. I would like patient to return in 2 months with 8 hour sinemet holiday.  4. Refer to  for memory evaluation  5. Patient was prescribed rollerator which did purchase and recommend that she use  6. No driving  7. Patient's Body mass index is 32.92 kg/m². BMI is above normal parameters. Recommendations include: none (medical contraindication).         allergies and all known medications/prescriptions have been reviewed using resources available on this encounter.    Return in about 2 months (around 9/29/2019).        Aleshia Dueñas, MORGAN

## 2019-07-31 LAB
Lab: ABNORMAL
METHYLMALONATE SERPL-SCNC: 446 NMOL/L (ref 0–378)

## 2019-08-01 LAB — VIT B6 SERPL-MCNC: 13.6 UG/L (ref 2–32.8)

## 2019-08-05 ENCOUNTER — HOSPITAL ENCOUNTER (OUTPATIENT)
Dept: NEUROLOGY | Facility: HOSPITAL | Age: 79
Discharge: HOME | End: 2019-08-05
Admitting: CLINICAL NURSE SPECIALIST

## 2019-08-05 DIAGNOSIS — R41.82 ALTERED MENTAL STATUS, UNSPECIFIED ALTERED MENTAL STATUS TYPE: ICD-10-CM

## 2019-08-05 PROCEDURE — 95816 EEG AWAKE AND DROWSY: CPT

## 2019-08-05 PROCEDURE — 95813 EEG EXTND MNTR 61-119 MIN: CPT | Performed by: PSYCHIATRY & NEUROLOGY

## 2019-08-12 ENCOUNTER — HOSPITAL ENCOUNTER (OUTPATIENT)
Dept: SPEECH THERAPY | Facility: HOSPITAL | Age: 79
Setting detail: THERAPIES SERIES
Discharge: HOME | End: 2019-08-12

## 2019-08-12 DIAGNOSIS — R41.89 OTHER SYMPTOMS AND SIGNS INVOLVING COGNITIVE FUNCTIONS AND AWARENESS: Primary | ICD-10-CM

## 2019-08-12 PROCEDURE — 96125 COGNITIVE TEST BY HC PRO: CPT | Performed by: SPEECH-LANGUAGE PATHOLOGIST

## 2019-08-12 NOTE — THERAPY DISCHARGE NOTE
Outpatient Speech Language Pathology   Adult Speech Language Cognitive Eval/Discharge   [place]     Patient Name: Jennifer Gomes  : 1940  MRN: [id number]  Today's Date: 2019         Visit Date: 2019    Patient Active Problem List   Diagnosis   • Gastroesophageal reflux disease   • Spinal stenosis, lumbar region, without neurogenic claudication   • Overweight   • Non-smoker   • Erosion of vaginal mesh (CMS/HCC)   • Adenocarcinoma of endometrium (CMS/HCC)   • Encounter for consultation   • S/P hysterectomy with oophorectomy   • Encounter for follow-up surveillance of endometrial cancer   • History of radiation therapy   • Obesity (BMI 30-39.9)   • Non-traumatic rhabdomyolysis   • Metabolic encephalopathy   • Acute renal failure superimposed on stage 3 chronic kidney disease (CMS/HCC)   • Acute respiratory failure with hypoxia and hypercapnia (CMS/HCC)   • Medical non-compliance, does not take narcotics as prescribed   • SIRS (systemic inflammatory response syndrome) (CMS/HCC)   • Chronic constipation   • Chronic pain syndrome   • Chronic prescription opiate use   • Normocytic anemia   • Hypothyroidism (acquired)   • Essential hypertension   • TOMÁS (acute kidney injury) (CMS/HCC)   • Venous insufficiency of both lower extremities   • Fluid retention   • Low blood pressure reading   • Morbidly obese (CMS/HCC)        Past Medical History:   Diagnosis Date   • Anxiety    • Arthritis    • Cancer (CMS/HCC)     uterine   • Chronic pain    • Depression    • Disease of thyroid gland    • Fibromyalgia    • Headache    • Hyperlipidemia    • Hypertension    • Incontinence    • Insomnia    • Leg pain    • Lumbar stenosis    • Peptic ulcer    • Restless legs    • Vaginal bleeding         Past Surgical History:   Procedure Laterality Date   • BLADDER REPAIR      MESH HAD TO BE REMOVED IN    • BREAST BIOPSY Right     benign   • BREAST CYST EXCISION Left    • CARDIAC CATHETERIZATION     • CARPAL  "TUNNEL RELEASE     • CATARACT EXTRACTION W/ INTRAOCULAR LENS  IMPLANT, BILATERAL     • CYSTECTOMY     • D&C HYSTEROSCOPY N/A 11/6/2017    Procedure: DILATATION AND CURETTAGE HYSTEROSCOPY;  Surgeon: Shasta Madrigal MD;  Location: [facility] OR;  Service:    • DILATION AND CURETTAGE, DIAGNOSTIC / THERAPEUTIC  2008   • ENDOSCOPY  09/23/2010    Short segment of Arriola's,Moderate chroninc esophagogastritis and negative H.pylori   • ENDOSCOPY N/A 9/25/2017    Procedure: ESOPHAGOGASTRODUODENOSCOPY WITH ANESTHESIA;  Surgeon: Tom Velasco DO;  Location: [facility] ENDOSCOPY;  Service:    • HYSTERECTOMY  12/20/2017   • ORIF TIBIA/FIBULA FRACTURES Left 2000   • TRANSVAGINAL TAPING SUSPENSION N/A 11/6/2017    Procedure: VAGINAL MESH REVISION;  Surgeon: Shasta Madrigal MD;  Location: [facility] OR;  Service:    • VAGINAL MESH REVISION  2013         Visit Dx:    ICD-10-CM ICD-9-CM   1. Other symptoms and signs involving cognitive functions and awareness R41.89 799.59     Patient was seen today for a memory evaluation. She complains of forgetfulness over the past 2-4 years, gradually progressing. She attributes the change to age. She states she is not concerned by it.   Patient was alert and cooperative. She appeared to put forth good effort on testing tasks. Difficulty was noted in the area of visuospatial tasks. Patient stated she felt \"rushed\" on the figure copy task despite being given instruction to take her time and be neat. Patient stated she does not drive but wants to go get her 's license renewed. See below for RBANS scores.     RBANS: The Repeatable Battery for the Assessment of Neuropsychological Status (RBANS) assesses patient function in the areas of Immediate and Delayed memory, visuospatial/constructional skills, language and attention. It is used to detect and track neurocognitive deficits.   Index score Percentile Qualitative Description   Immediate Memory 90 25 Average   Visuospatial 78 7 Borderline   Language 108 " "70 Average   Attention 94 34 Average   Delayed Memory 81 10 Low Average   Total Scale 86 18 Low Average   Comments:        SLP SLC Evaluation - 08/12/19 1400        Communication Assessment/Intervention    Document Type  evaluation   -KG    Subjective Information  complains of;pain   -KG    Patient Observations  alert;cooperative;agree to therapy   -KG    Patient Effort  good   -KG    Symptoms Noted During/After Treatment  none   -KG       General Information    Patient Profile Reviewed  yes   -KG    Pertinent History Of Current Problem  Progressing memory loss over the past 2-4 years.    -KG    Precautions/Limitations, Vision  WFL with corrective lenses   -KG    Precautions/Limitations, Hearing  WFL;for purposes of eval   -KG    Patient Level of Education  Master's degree +30 hours in Education.    -KG    Prior Level of Function-Communication  WFL   -KG    Plans/Goals Discussed with  patient   -KG    Barriers to Rehab  none identified   -KG    Patient's Goals for Discharge  return to all previous roles/activities   -KG    Standardized Assessment Used  RBANS   -KG       Pain Assessment    Additional Documentation  Pain Scale: Numbers Pre/Post-Treatment (Group)   -KG       Pain Scale: Numbers Pre/Post-Treatment    Pain Scale: Numbers, Pretreatment  7/10   -KG    Pain Scale: Numbers, Post-Treatment  7/10   -KG    Pre/Post Treatment Pain Comment  back and \"gluteus\"   -KG       Motor Speech Assessment/Intervention    Motor Speech Function  WFL   -KG       Cognitive Assessment Intervention- SLP    Orientation Status (Cognition)  WFL   -KG    Memory (Cognitive)  WFL   -KG    Attention (Cognitive)  WFL   -KG    Right Hemisphere Function  visuo-spatial;mild impairment Pt stated she felt \"rushed\"    Pt stated she felt \"rushed\"  -KG       Standardized Tests    Cognitive/Memory Tests  RBANS: Repeatable Battery for the Assessment of Neuropsychological Status   -KG       RBANS- Repeatable Battery for the Assessment of " Neuropsychological Status    RBANS Comments  See report for scores   -KG       SLP Clinical Impressions    SLP Diagnosis  Memory WFL   -KG    SLC Criteria for Skilled Therapy Interventions Met  no problems identified which require skilled intervention   -KG       Recommendations    Therapy Frequency (SLP SLC)  evaluation only   -KG       SLP Discharge Summary    Discharge Destination  home   -KG      User Key  (r) = Recorded By, (t) = Taken By, (c) = Cosigned By    Initials Name Provider Type    Josefina Yang CCC-SLP Speech and Language Pathologist                        OP SLP Education     Row Name 08/12/19 1451       Education    Barriers to Learning  No barriers identified  -KG    Education Provided  Patient expressed understanding of evaluation  -KG    Teaching Response  Verbalized understanding  -KG      User Key  (r) = Recorded By, (t) = Taken By, (c) = Cosigned By    Initials Name Effective Dates    Josefina Yang CCC-SLP 02/05/19 -               OP SLP Assessment/Plan - 08/12/19 1450        SLP Assessment    Functional Problems  Speech Language- Adult/Cognition   -KG    Clinical Impression: Speech Language-Adult/Congnition  Cognitive Communication WFL   -KG    SLP Diagnosis  Memory WFL   -KG    Prognosis  Good (comment)   -KG    Patient would benefit from skilled therapy intervention  No   -KG       SLP Plan    Plan Comments  Follow up with MD   -KG      User Key  (r) = Recorded By, (t) = Taken By, (c) = Cosigned By    Initials Name Provider Type    Josefina Yang CCC-SLP Speech and Language Pathologist                 Time Calculation:   SLP Start Time: 1400  SLP Stop Time: 1500  SLP Time Calculation (min): 60 min    Therapy Charges for Today     Code Description Service Date Service Provider Modifiers Qty    70208101312 HC ST STD COG PERF TEST PER HOUR 8/12/2019 Josefina Nicole CCC-SLP GN 1               OP SLP Discharge Summary  Date of Discharge: 08/12/19  Reason for Discharge: all  goals and outcomes met, no further needs identified  Progress Toward Achieving Short/long Term Goals: discharge on same date as initial evaluation  Discharge Destination: home  Discharge Instructions: Follow up with MD    Thank you for this referral.   Josefina Nicole CCC-SLP  8/12/2019

## 2019-08-28 RX ORDER — CARVEDILOL 12.5 MG/1
TABLET ORAL
Qty: 180 TABLET | Refills: 4 | Status: SHIPPED | OUTPATIENT
Start: 2019-08-28 | End: 2020-01-22 | Stop reason: SDUPTHER

## 2019-08-28 NOTE — TELEPHONE ENCOUNTER
[facility] called requesting a refill of the below medication which has been pended for you:     Requested Prescriptions     Pending Prescriptions Disp Refills    carvedilol (COREG) 12.5 MG tablet [Pharmacy Med Name: CARVEDILOL (IR) TABS 12.5MG] 180 tablet 4     Sig: TAKE 1 TABLET TWICE A DAY       Last Appointment Date: 4/30/2019  Next Appointment Date: 9/23/2019    Allergies   Allergen Reactions    Ambien [Zolpidem Tartrate]     Codeine     Lyrica [Pregabalin]     Morphine     Requip [Ropinirole Hcl]     Tizanidine      Terrible nightmares

## 2019-08-30 ENCOUNTER — HOSPITAL ENCOUNTER (OUTPATIENT)
Dept: MAMMOGRAPHY | Facility: HOSPITAL | Age: 79
Discharge: HOME | End: 2019-08-30
Admitting: OBSTETRICS & GYNECOLOGY

## 2019-08-30 DIAGNOSIS — Z12.39 BREAST CANCER SCREENING: ICD-10-CM

## 2019-08-30 PROCEDURE — 77067 SCR MAMMO BI INCL CAD: CPT

## 2019-08-30 PROCEDURE — 77063 BREAST TOMOSYNTHESIS BI: CPT

## 2019-09-11 RX ORDER — CYCLOBENZAPRINE HCL 10 MG
10 TABLET ORAL 3 TIMES DAILY PRN
Qty: 90 TABLET | Refills: 1 | Status: SHIPPED | OUTPATIENT
Start: 2019-09-11 | End: 2019-11-18 | Stop reason: SDUPTHER

## 2019-09-23 ENCOUNTER — OFFICE VISIT (OUTPATIENT)
Dept: INTERNAL MEDICINE | Age: 79
End: 2019-09-23
Payer: MEDICARE

## 2019-09-23 VITALS
BODY MASS INDEX: 35.7 KG/M2 | WEIGHT: 194 LBS | RESPIRATION RATE: 18 BRPM | OXYGEN SATURATION: 98 % | SYSTOLIC BLOOD PRESSURE: 136 MMHG | DIASTOLIC BLOOD PRESSURE: 78 MMHG | HEART RATE: 88 BPM | HEIGHT: 62 IN

## 2019-09-23 DIAGNOSIS — E55.9 VITAMIN D DEFICIENCY: ICD-10-CM

## 2019-09-23 DIAGNOSIS — Z23 NEED FOR PNEUMOCOCCAL VACCINATION: ICD-10-CM

## 2019-09-23 DIAGNOSIS — D64.9 ANEMIA, UNSPECIFIED TYPE: ICD-10-CM

## 2019-09-23 DIAGNOSIS — M89.9 DISORDER OF BONE: ICD-10-CM

## 2019-09-23 DIAGNOSIS — I10 ESSENTIAL HYPERTENSION: ICD-10-CM

## 2019-09-23 DIAGNOSIS — G89.4 CHRONIC PAIN SYNDROME: ICD-10-CM

## 2019-09-23 DIAGNOSIS — E03.9 HYPOTHYROIDISM (ACQUIRED): ICD-10-CM

## 2019-09-23 DIAGNOSIS — K21.9 GASTROESOPHAGEAL REFLUX DISEASE WITHOUT ESOPHAGITIS: ICD-10-CM

## 2019-09-23 DIAGNOSIS — Z00.00 HEALTHCARE MAINTENANCE: ICD-10-CM

## 2019-09-23 DIAGNOSIS — Z23 NEED FOR INFLUENZA VACCINATION: Primary | ICD-10-CM

## 2019-09-23 DIAGNOSIS — F41.9 ANXIETY: ICD-10-CM

## 2019-09-23 LAB
ALBUMIN SERPL-MCNC: 3.9 G/DL (ref 3.5–5.2)
ALP BLD-CCNC: 70 U/L (ref 35–104)
ALT SERPL-CCNC: <5 U/L (ref 5–33)
ANION GAP SERPL CALCULATED.3IONS-SCNC: 11 MMOL/L (ref 7–19)
AST SERPL-CCNC: 14 U/L (ref 5–32)
BASOPHILS ABSOLUTE: 0 K/UL (ref 0–0.2)
BASOPHILS RELATIVE PERCENT: 0.2 % (ref 0–1)
BILIRUB SERPL-MCNC: <0.2 MG/DL (ref 0.2–1.2)
BILIRUBIN URINE: NEGATIVE
BLOOD, URINE: NEGATIVE
BUN BLDV-MCNC: 30 MG/DL (ref 8–23)
CALCIUM SERPL-MCNC: 9.8 MG/DL (ref 8.8–10.2)
CHLORIDE BLD-SCNC: 106 MMOL/L (ref 98–111)
CHOLESTEROL, TOTAL: 224 MG/DL (ref 160–199)
CLARITY: CLEAR
CO2: 27 MMOL/L (ref 22–29)
COLOR: YELLOW
CREAT SERPL-MCNC: 1 MG/DL (ref 0.5–0.9)
EOSINOPHILS ABSOLUTE: 0.2 K/UL (ref 0–0.6)
EOSINOPHILS RELATIVE PERCENT: 3.1 % (ref 0–5)
GFR NON-AFRICAN AMERICAN: 54
GLUCOSE BLD-MCNC: 106 MG/DL (ref 74–109)
GLUCOSE URINE: NEGATIVE MG/DL
HCT VFR BLD CALC: 34.8 % (ref 37–47)
HDLC SERPL-MCNC: 55 MG/DL (ref 65–121)
HEMOGLOBIN: 11.3 G/DL (ref 12–16)
IMMATURE GRANULOCYTES #: 0 K/UL
IRON: 103 UG/DL (ref 37–145)
KETONES, URINE: NEGATIVE MG/DL
LDL CHOLESTEROL CALCULATED: 146 MG/DL
LEUKOCYTE ESTERASE, URINE: NEGATIVE
LYMPHOCYTES ABSOLUTE: 1.2 K/UL (ref 1.1–4.5)
LYMPHOCYTES RELATIVE PERCENT: 22.7 % (ref 20–40)
MCH RBC QN AUTO: 29.5 PG (ref 27–31)
MCHC RBC AUTO-ENTMCNC: 32.5 G/DL (ref 33–37)
MCV RBC AUTO: 90.9 FL (ref 81–99)
MONOCYTES ABSOLUTE: 0.5 K/UL (ref 0–0.9)
MONOCYTES RELATIVE PERCENT: 9.3 % (ref 0–10)
NEUTROPHILS ABSOLUTE: 3.3 K/UL (ref 1.5–7.5)
NEUTROPHILS RELATIVE PERCENT: 64.1 % (ref 50–65)
NITRITE, URINE: NEGATIVE
PDW BLD-RTO: 13 % (ref 11.5–14.5)
PH UA: 6 (ref 5–8)
PLATELET # BLD: 160 K/UL (ref 130–400)
PMV BLD AUTO: 11.1 FL (ref 9.4–12.3)
POTASSIUM SERPL-SCNC: 4.7 MMOL/L (ref 3.5–5)
PROTEIN UA: NEGATIVE MG/DL
RBC # BLD: 3.83 M/UL (ref 4.2–5.4)
SODIUM BLD-SCNC: 144 MMOL/L (ref 136–145)
SPECIFIC GRAVITY UA: 1.02 (ref 1–1.03)
TOTAL PROTEIN: 6.9 G/DL (ref 6.6–8.7)
TRIGL SERPL-MCNC: 115 MG/DL (ref 0–149)
TSH SERPL DL<=0.05 MIU/L-ACNC: 2.21 UIU/ML (ref 0.27–4.2)
URINE REFLEX TO CULTURE: NORMAL
UROBILINOGEN, URINE: 0.2 E.U./DL
VITAMIN D 25-HYDROXY: 51 NG/ML
WBC # BLD: 5.2 K/UL (ref 4.8–10.8)

## 2019-09-23 PROCEDURE — 1123F ACP DISCUSS/DSCN MKR DOCD: CPT | Performed by: NURSE PRACTITIONER

## 2019-09-23 PROCEDURE — 4040F PNEUMOC VAC/ADMIN/RCVD: CPT | Performed by: NURSE PRACTITIONER

## 2019-09-23 PROCEDURE — G8598 ASA/ANTIPLAT THER USED: HCPCS | Performed by: NURSE PRACTITIONER

## 2019-09-23 PROCEDURE — G0009 ADMIN PNEUMOCOCCAL VACCINE: HCPCS | Performed by: NURSE PRACTITIONER

## 2019-09-23 PROCEDURE — G8417 CALC BMI ABV UP PARAM F/U: HCPCS | Performed by: NURSE PRACTITIONER

## 2019-09-23 PROCEDURE — G8400 PT W/DXA NO RESULTS DOC: HCPCS | Performed by: NURSE PRACTITIONER

## 2019-09-23 PROCEDURE — 99214 OFFICE O/P EST MOD 30 MIN: CPT | Performed by: NURSE PRACTITIONER

## 2019-09-23 PROCEDURE — 90732 PPSV23 VACC 2 YRS+ SUBQ/IM: CPT | Performed by: NURSE PRACTITIONER

## 2019-09-23 PROCEDURE — 1090F PRES/ABSN URINE INCON ASSESS: CPT | Performed by: NURSE PRACTITIONER

## 2019-09-23 PROCEDURE — G8427 DOCREV CUR MEDS BY ELIG CLIN: HCPCS | Performed by: NURSE PRACTITIONER

## 2019-09-23 PROCEDURE — 90653 IIV ADJUVANT VACCINE IM: CPT | Performed by: NURSE PRACTITIONER

## 2019-09-23 PROCEDURE — 1036F TOBACCO NON-USER: CPT | Performed by: NURSE PRACTITIONER

## 2019-09-23 PROCEDURE — G0008 ADMIN INFLUENZA VIRUS VAC: HCPCS | Performed by: NURSE PRACTITIONER

## 2019-09-23 RX ORDER — BUMETANIDE 1 MG/1
1 TABLET ORAL DAILY
Qty: 90 TABLET | Refills: 1 | Status: SHIPPED | OUTPATIENT
Start: 2019-09-23 | End: 2020-03-09

## 2019-09-23 RX ORDER — OXYCODONE HYDROCHLORIDE 15 MG/1
TABLET ORAL
COMMUNITY
Start: 2019-09-07 | End: 2021-04-05 | Stop reason: ALTCHOICE

## 2019-09-23 RX ORDER — ALPRAZOLAM 0.5 MG/1
TABLET ORAL
COMMUNITY
Start: 2019-08-24 | End: 2019-09-23

## 2019-09-23 ASSESSMENT — ENCOUNTER SYMPTOMS
BLOOD IN STOOL: 0
COUGH: 0
CONSTIPATION: 0
DIARRHEA: 0
VOMITING: 0
SHORTNESS OF BREATH: 0
ABDOMINAL DISTENTION: 0
BACK PAIN: 1
WHEEZING: 0
SORE THROAT: 0
EYE DISCHARGE: 0
STRIDOR: 0
NAUSEA: 0
CHOKING: 0
EYE ITCHING: 0
COLOR CHANGE: 0
ABDOMINAL PAIN: 0
TROUBLE SWALLOWING: 0

## 2019-09-23 NOTE — PROGRESS NOTES
[facility] INTERNAL MEDICINE  [address] [address] [address]  Dept: [phone]  Dept Fax: [phone]  Loc: [phone]    Kishore Greer is a 70 y.o. female who presents today for her medical conditions/complaints as noted below. Kishore Greer is c/maame Hypertension (Patient is here for routine follow up visit. Patient has not had labs done for this visit.)        HPI:     HPI   1. HTN:  Stable on current meds; No side effects of the meds; Takes as directed; takes blood pressure 3-4 times a week    2. Anemia-  She has history of this;  Neuro told her she had abnormal labs but I didn't get those;    3. Hypothyroidism;  ;  Stable  ;takes meds as directed; Has had no side effects of the meds;   4. GERD:  Stable off her meds; She is taking famotidine OTC  5. Vit  Def  Takes as directed; No side effects; She did not get her labs this am   6. Chronic pain syndrome; She was off oxycodone she had stopped it but since pain is worse and she is back with Dr King Hidalgo on oxycodone;    7. Anxiety; She has been on xanax with Dr Bjorn Galeana; She will get her refills from her if she decides to take them again     8. Healthcare maintenance  Bone density and mammogram were normal at [facility]   Patient presents with    Hypertension     Patient is here for routine follow up visit. Patient has not had labs done for this visit.        Past Medical History:   Diagnosis Date    Adenocarcinoma of endometrium, stage 1 ([facility] [address] 11/14/2017    Anemia     Arthritis     CAD (coronary artery disease)     \"Stiff Valve\"    Chronic kidney disease     Depression     Fibrocystic breast disease     5/2/17 biopsy - benign FCBD w/microcalcifications - [facility]     GERD (gastroesophageal reflux disease)     H/O vaginal bleeding     managed Dr Bjorn Galeana    Hypertension     Hypothyroidism (acquired)     Hypothyroidism (acquired)     Mixed hyperlipidemia     Obesity     (SINEMET)  MG per tablet Take 1 tablet by mouth 4 times daily For restless legs and cramping 360 tablet 0    carvedilol (COREG) 12.5 MG tablet TAKE 1 TABLET TWICE A DAY (Patient not taking: Reported on 9/23/2019) 180 tablet 4    atorvastatin (LIPITOR) 40 MG tablet TAKE 1 TABLET DAILY AS DIRECTED (CHANGE PRIMARY CARE PHYSICIAN TO NILDA LINTON FOR ALL FUTURE REFILLS) (Patient not taking: Reported on 9/23/2019) 90 tablet 2    Omega-3 Fatty Acids (FISH OIL) 1000 MG CAPS Take 2 capsules by mouth 2 times daily (Patient not taking: Reported on 9/23/2019) 180 capsule 2    Prenatal Multivit-Min-Fe-FA (PRENATAL 1 + IRON PO) Take by mouth      loratadine (CLARITIN) 10 MG capsule Take 10 mg by mouth daily      FIBER FORMULA PO Take by mouth      aspirin 81 MG tablet Take 81 mg by mouth daily       No current facility-administered medications for this visit.       Allergies   Allergen Reactions    Ambien [Zolpidem Tartrate]     Codeine     Lyrica [Pregabalin]     Morphine     Requip [Ropinirole Hcl]     Tizanidine      Terrible nightmares         Health Maintenance   Topic Date Due    DEXA (modify frequency per FRAX score)  09/19/2007    Shingles Vaccine (2 of 3) 01/15/2014    Breast cancer screen  04/17/2019    Annual Wellness Visit (AWV)  05/29/2019    DTaP/Tdap/Td vaccine (1 - Tdap) 10/14/2019 (Originally 9/19/1959)    TSH testing  03/18/2020    Potassium monitoring  05/23/2020    Creatinine monitoring  05/23/2020    Flu vaccine  Completed    Pneumococcal 65+ years Vaccine  Completed       No results found for: LABA1C  No results found for: PSA, PSADIA  TSH   Date Value Ref Range Status   03/18/2019 2.070 0.270 - 4.200 uIU/mL Final   ]  Lab Results   Component Value Date     05/23/2019    K 4.1 05/23/2019     05/23/2019    CO2 29 05/23/2019    BUN 28 (H) 05/23/2019    CREATININE 1.0 (H) 05/23/2019    GLUCOSE 104 05/23/2019    CALCIUM 9.6 05/23/2019    PROT 6.9 05/23/2019    LABALBU 3.9 05/23/2019    BILITOT 0.4 05/23/2019    ALKPHOS 66 05/23/2019    AST 14 05/23/2019    ALT 11 05/23/2019    LABGLOM 54 (A) 05/23/2019     Lab Results   Component Value Date    CHOL 152 (L) 03/18/2019    CHOL 162 09/10/2018    CHOL 162 03/07/2018     Lab Results   Component Value Date    TRIG 242 (H) 03/18/2019    TRIG 255 (H) 09/10/2018    TRIG 141 03/07/2018     Lab Results   Component Value Date    HDL 44 (L) 03/18/2019    HDL 43 (L) 09/10/2018    HDL 48 (L) 03/07/2018     Lab Results   Component Value Date    LDLCALC 60 03/18/2019    LDLCALC 68 09/10/2018    LDLCALC 86 03/07/2018     Lab Results   Component Value Date     05/23/2019    K 4.1 05/23/2019     05/23/2019    CO2 29 05/23/2019    BUN 28 05/23/2019    CREATININE 1.0 05/23/2019    GLUCOSE 104 05/23/2019    CALCIUM 9.6 05/23/2019      Lab Results   Component Value Date    WBC 5.2 09/23/2019    HGB 11.3 (L) 09/23/2019    HCT 34.8 (L) 09/23/2019    MCV 90.9 09/23/2019     09/23/2019    LYMPHOPCT 22.7 09/23/2019    RBC 3.83 (L) 09/23/2019    MCH 29.5 09/23/2019    MCHC 32.5 (L) 09/23/2019    RDW 13.0 09/23/2019     Lab Results   Component Value Date    VITD25 44.9 03/18/2019       Subjective:      Review of Systems   Constitutional: Negative for fatigue, fever and unexpected weight change. HENT: Negative for ear discharge, ear pain, mouth sores, sore throat and trouble swallowing. Eyes: Negative for discharge, itching and visual disturbance. Respiratory: Negative for cough, choking, shortness of breath, wheezing and stridor. Cardiovascular: Negative for chest pain, palpitations and leg swelling. Gastrointestinal: Negative for abdominal distention, abdominal pain, blood in stool, constipation, diarrhea, nausea and vomiting. Gerd   Endocrine: Negative for cold intolerance, polydipsia and polyuria. Genitourinary: Negative for difficulty urinating, dysuria, frequency and urgency. Musculoskeletal: Positive for back pain.

## 2019-10-01 ENCOUNTER — OFFICE VISIT (OUTPATIENT)
Dept: NEUROLOGY | Facility: CLINIC | Age: 79
End: 2019-10-01

## 2019-10-01 VITALS
OXYGEN SATURATION: 97 % | HEIGHT: 62 IN | HEART RATE: 73 BPM | BODY MASS INDEX: 35.7 KG/M2 | DIASTOLIC BLOOD PRESSURE: 70 MMHG | SYSTOLIC BLOOD PRESSURE: 132 MMHG | WEIGHT: 194 LBS

## 2019-10-01 DIAGNOSIS — R41.3 IMPAIRED MEMORY: ICD-10-CM

## 2019-10-01 DIAGNOSIS — G47.33 OSA (OBSTRUCTIVE SLEEP APNEA): ICD-10-CM

## 2019-10-01 DIAGNOSIS — R51.9 CHRONIC INTRACTABLE HEADACHE, UNSPECIFIED HEADACHE TYPE: ICD-10-CM

## 2019-10-01 DIAGNOSIS — G89.29 CHRONIC INTRACTABLE HEADACHE, UNSPECIFIED HEADACHE TYPE: ICD-10-CM

## 2019-10-01 DIAGNOSIS — R41.82 ALTERED MENTAL STATUS, UNSPECIFIED ALTERED MENTAL STATUS TYPE: Primary | ICD-10-CM

## 2019-10-01 DIAGNOSIS — G44.40 MEDICATION OVERUSE HEADACHE: ICD-10-CM

## 2019-10-01 PROCEDURE — 99214 OFFICE O/P EST MOD 30 MIN: CPT | Performed by: CLINICAL NURSE SPECIALIST

## 2019-10-01 RX ORDER — CARVEDILOL 12.5 MG/1
12.5 TABLET ORAL 2 TIMES DAILY WITH MEALS
COMMUNITY
Start: 2019-08-28

## 2019-10-01 RX ORDER — MEMANTINE HYDROCHLORIDE 5 MG/1
5 TABLET ORAL 2 TIMES DAILY
Qty: 60 TABLET | Refills: 2 | Status: SHIPPED | OUTPATIENT
Start: 2019-10-01 | End: 2019-12-18

## 2019-10-01 NOTE — PATIENT INSTRUCTIONS

## 2019-10-01 NOTE — PROGRESS NOTES
Subjective     Chief Complaint   Patient presents with   • Headache     pt complains of a daily HA   • Gait Problem     pt denies falls, complains of bilateral LE tinglingness         Jennifer Gomes is a 79 y.o. female right handed retiree, middle school science and . She is here today for hospital follow up for AMS and impaired memory. She is by herself. She has hx of ovarian cancer, lumbar spinal stenosis managed by pain management, HTN, HLD, CKD and TOMÁS. She also has chronic HA and does take PRN imitrex. She is ambulating with 2 canes. She was last seen 7/29/19. She did have additional labs, EEG, ST memory evaluation. I have reviewed results with patient. Patient states she is back to her baseline. She denies further episodes of altered mental status. At last visit had complaints of short term memory issues. This has improved.  TSh was elevated. She had seen PCP and TSH repeated and WNL. H/H is improving.     Patient has hx of RLS and sees pain management. She has been taking Sinemet 25/100 four times daily. I had wanted to see her on sinemet holiday. Her last dose was 0300 and it is now 1130 AM. She states that taking 1 dose in the evening does help RLS. Feels like she is in a fog when taking four times daily. With sinemet holiday, no observed tremors or parkinson like features seen.     As you recall, patient was hospitalized 4.2019 with AMS and uncertain of etiology but thought to be related to metabolic encephalopathy and opioid use. Patient is not able to provide much information about that event, only what was told to her. She states she had not had further episodes since. She was hospitalized 5/2019 with TOMÁS. She does take oxycodone 15 mg 4-5 times daily along with flexeril 10 mg  BID. She had seen Dr. KELLI Lucio and most recently Dr. Miller for chronic lumbar spinal stenosis. Patient declines surgical intervention as uncertain if surgery would improve her pain.      in regards to complaints  of short term memory loss, Years ago she would forget to pay a certain bills and subsequently now has automatic withdrawal. She drives very little (the last time was June 2019). She denies getting lost.     At some point, she was having what sounds to be RLS and PCP prescribed sinemet 25/100 QID. Stating this was prescribed as providers were trying to decrease the amount of pain medications.  UDS 4/12/19  positive for opiates and benzodiazepines even though the patient has no prescription for benzodiazepines. Per Dr. Brady note no MRI brain warranted as exam did not indicate CNS lesion.     Patient does have a New complaint of HA since age 28 and over the years have become more frequent and now occurs every morning and tightness in neck and if does not have one in the morning will have one in the evening. If can get imitrex soon enough HA relieved in 1 hour or less. Takes imitrex daily but will often run of imitrex as get 90 day supply. HA can occur daily for weeks but can go 1 week without a HA. Patient also takes ibuprofen and ASA daily for HA as well.   Patient also has hx of RAFAL but states could not tolerate CPAP. Echocardiogram shows dilated left atrium. Untreated RAFAL could be playing a part in patient symptoms and complaints. EPWORTH= 14, STOP-BANG= HIGH      4/12/19: Patient previously evaluated by Dr. Raoul Lucio in neurosurgery in August 2017.  She was found to have L4-L5 central canal stenosis which was very significant at the time.  His initial recommendations was for surgical fixation.  Subsequent films and a subsequent visit with the family and the patient they elected not to proceed with surgery.  Per the patient's description it was because Dr. Lucio concluded that the surgery would likely not offer significant pain relief.  The patient has been on significant amount of pain medications.  She took oxycodone 15 mg tablets up to 6 times per day.  Recently she has been taken over by Dr. maher in the pain  management group.  As of 4/2019 He has decreased her oxycodone down to 2 times per day.  She is also on Sinemet therapy.  She has no history of Parkinson's disease.  She may have a component of restless leg syndrome.  She told Dr. Brady that the indication for starting Sinemet was by her primary care physician.  At the time she was having radicular pain shooting down both legs from her back.  Her primary care physician was attempting to wean off the oxycodone.  He started Sinemet at that time to help with the pain.  She believes that it helped with the pain more than any other medication she takes.     She has no history of seizure disorder.  She has no history of head trauma.  The main indications for 4/2019 neurology consultation is that her family tells me that she has spells where she has decreased levels of consciousness.  They give the example that recently she was in a car.  They asked her to get out of the car.  She refused to go the car and sat in the car nonresponding to her family.  No seizure activity was seen.  She had no tongue biting and no incontinence.  The patient recalls the event greatly.  She tells me that she is often daydreaming during these events and just does not want to answer her family.  She recalls the event in great detail.  She tells me these events are quite common and she believes some of them are secondary to overuse of her medications.  She admited to this.  The duration of these can be for several minutes to several hours.       MEDICATIONS USED FOR HA:  TYLENOL  IBUPROFEN  ASA  IMITREX  Xanax  Coreg  Oxycodone  Elavil  prozac  Darvon        Altered Mental Status   This is a chronic problem. Episode onset: 4/12/19. The problem has been resolved. Associated symptoms include headaches, nausea and vomiting. Pertinent negatives include no arthralgias, chest pain, fatigue, visual change or weakness. Associated symptoms comments: Chronic pain and opioid use.. Exacerbated by: TOMÁS,     Headache    This is a chronic (since age 28) problem. Episode onset: have become more frequent over the years and in last 1-2 years more frequent to daily. Episode frequency: last about 1 hour when takes imitrex, if does not take imitrex will last 1-2 days. The pain is located in the right unilateral (sometimes will be on left side) region. Radiates to: radiates occipital to frontal region to behind eyes. The quality of the pain is described as sharp and stabbing. Associated symptoms include back pain, blurred vision, eye watering, nausea, phonophobia, photophobia and vomiting. Pertinent negatives include no dizziness, loss of balance, rhinorrhea, tingling, tinnitus, visual change or weakness. The symptoms are aggravated by emotional stress and exposure to cold air (denies caffiene use other than occasional ice tea). She has tried darkened room, antidepressants, beta blockers, triptans and NSAIDs (ibuprofen and ASA will take 4 tablets) for the symptoms. The treatment provided mild relief. Her past medical history is significant for hypertension, migraine headaches and migraines in the family (father and brother).        Current Outpatient Medications   Medication Sig Dispense Refill   • ALPRAZolam (XANAX) 0.5 MG tablet Take 1 tablet by mouth At Night As Needed for Anxiety. 30 tablet 2   • bumetanide (BUMEX) 1 MG tablet Take 1 tablet by mouth Daily. 15 tablet 0   • carbidopa-levodopa (SINEMET)  MG per tablet Take 1 tablet by mouth 4 (Four) Times a Day.     • carvedilol (COREG) 12.5 MG tablet Take 1 tablet by mouth Daily.     • cyclobenzaprine (FLEXERIL) 10 MG tablet Take 10 mg by mouth 2 (Two) Times a Day As Needed for Muscle Spasms.     • ergocalciferol (ERGOCALCIFEROL) 40347 units capsule Take 50,000 Units by mouth.     • levothyroxine (SYNTHROID, LEVOTHROID) 50 MCG tablet Take 50 mcg by mouth Every Morning.     • oxyCODONE HCl 15 MG tablet  Take 15 mg by mouth 2 (Two) Times a Day.     • SUMAtriptan  (IMITREX) 50 MG tablet Take 1 tablet by mouth 2 (Two) Times a Day As Needed for Migraine. 30 tablet 3   • memantine (NAMENDA) 5 MG tablet Take 1 tablet by mouth 2 (Two) Times a Day. 60 tablet 2     No current facility-administered medications for this visit.        Past Medical History:   Diagnosis Date   • Anxiety    • Arthritis    • Cancer (CMS/HCC)     uterine   • Chronic pain    • Depression    • Disease of thyroid gland    • Fibromyalgia    • Headache    • Hyperlipidemia    • Hypertension    • Incontinence    • Insomnia    • Leg pain    • Lumbar stenosis    • Peptic ulcer    • Restless legs    • Vaginal bleeding        Past Surgical History:   Procedure Laterality Date   • BLADDER REPAIR  2011    MESH HAD TO BE REMOVED IN 2013   • BREAST BIOPSY Right 2017    benign   • BREAST CYST EXCISION Left 1982   • CARDIAC CATHETERIZATION     • CARPAL TUNNEL RELEASE     • CATARACT EXTRACTION W/ INTRAOCULAR LENS  IMPLANT, BILATERAL     • CYSTECTOMY     • D&C HYSTEROSCOPY N/A 11/6/2017    Procedure: DILATATION AND CURETTAGE HYSTEROSCOPY;  Surgeon: Shasta Madrigal MD;  Location: [facility] OR;  Service:    • DILATION AND CURETTAGE, DIAGNOSTIC / THERAPEUTIC  2008   • ENDOSCOPY  09/23/2010    Short segment of Arriola's,Moderate chroninc esophagogastritis and negative H.pylori   • ENDOSCOPY N/A 9/25/2017    Procedure: ESOPHAGOGASTRODUODENOSCOPY WITH ANESTHESIA;  Surgeon: Tom Velasco DO;  Location: [facility] ENDOSCOPY;  Service:    • HYSTERECTOMY  12/20/2017   • ORIF TIBIA/FIBULA FRACTURES Left 2000   • TRANSVAGINAL TAPING SUSPENSION N/A 11/6/2017    Procedure: VAGINAL MESH REVISION;  Surgeon: Shasta Madrigal MD;  Location: [facility] OR;  Service:    • VAGINAL MESH REVISION  2013       family history includes Cancer in her paternal grandmother; Diabetes in her mother and sister; Lung cancer in her paternal grandfather; Lymphoma in her brother; Multiple myeloma in her mother; Ovarian cancer in her paternal aunt; Prostate cancer in her  "brother.    Social History     Tobacco Use   • Smoking status: Never Smoker   • Smokeless tobacco: Never Used   Substance Use Topics   • Alcohol use: No   • Drug use: No       Review of Systems   Constitutional: Negative.  Negative for fatigue.   HENT: Negative for rhinorrhea, tinnitus and trouble swallowing.    Eyes: Positive for blurred vision and photophobia.   Respiratory: Positive for shortness of breath. Negative for apnea.    Cardiovascular: Positive for leg swelling (bilateral, right more than left). Negative for chest pain.   Gastrointestinal: Positive for nausea and vomiting. Negative for constipation and diarrhea.   Endocrine: Negative.  Negative for cold intolerance and heat intolerance.   Genitourinary: Positive for dysuria and frequency.   Musculoskeletal: Positive for back pain and gait problem. Negative for arthralgias.   Skin: Negative.    Allergic/Immunologic: Negative.    Neurological: Positive for headaches. Negative for dizziness, tingling, speech difficulty, weakness and loss of balance.   Hematological: Negative.    Psychiatric/Behavioral: Negative for confusion and hallucinations.   All other systems reviewed and are negative.      Objective     /70 (BP Location: Left arm, Patient Position: Sitting, Cuff Size: Adult)   Pulse 73   Ht 157.5 cm (62\")   Wt 88 kg (194 lb)   LMP  (LMP Unknown)   SpO2 97%   BMI 35.48 kg/m² , Body mass index is 35.48 kg/m².    Physical Exam   Constitutional: She is oriented to person, place, and time. Vital signs are normal. She appears well-developed and well-nourished. She is cooperative.   HENT:   Head: Normocephalic and atraumatic.   Right Ear: Hearing and external ear normal.   Left Ear: Hearing and external ear normal.   Nose: Nose normal.   Mouth/Throat: Oropharynx is clear and moist.   Eyes: Conjunctivae, EOM and lids are normal. Pupils are equal, round, and reactive to light. Right eye exhibits normal extraocular motion and no nystagmus. Left eye " exhibits normal extraocular motion and no nystagmus. Right pupil is round and reactive. Left pupil is round and reactive. Pupils are equal.   Neck: Neck supple. Muscular tenderness present. Carotid bruit is not present. Decreased range of motion (when turning head to left, crepitus) present.   Cardiovascular: Normal rate, regular rhythm and normal heart sounds.   No murmur heard.  Pulmonary/Chest: Effort normal and breath sounds normal. She has no decreased breath sounds.   Abdominal: Soft. Bowel sounds are normal.     Vascular Status -  Her right foot exhibits abnormal foot edema. Her left foot exhibits abnormal foot edema.  Neurological: She is alert and oriented to person, place, and time. She has normal strength. She displays no tremor. No cranial nerve deficit. She exhibits normal muscle tone. She displays a negative Romberg sign. Gait (kyphotic, imbalance. uses 2 canes) abnormal. Coordination (no ataxia, FTN, HTS intact bilateral) normal.   Reflex Scores:       Tricep reflexes are 2+ on the right side and 2+ on the left side.       Bicep reflexes are 2+ on the right side and 2+ on the left side.       Brachioradialis reflexes are 2+ on the right side and 2+ on the left side.       Patellar reflexes are 1+ on the right side and 1+ on the left side.       Achilles reflexes are 1+ on the right side and 1+ on the left side.  Awake, alert. No aphasia, no dysarthria  Completes simple and complex commands  MMSE 29/39    COMPLETES 3/3 COMPLEX COMMANDS    CN II:  Visual fields full.  Pupils equally reactive to light  CN III, IV, VI:  Extraocular Muscles full with no signs of nystagmus  CN V:  Facial sensory is symmetric with no asymetries.  CN VII:  Facial motor symmetric  CN VIII:  Gross hearing intact bilaterally  CN IX:  Palate elevates symmetrically  CN X:  Palate elevates symmetrically  CN XI:  Shoulder shrug symmetric  CN XII:  Tongue is midline on protrusion     symmetric strength bilateral upper and lower  extremities.   Skin: Skin is warm and dry.   Psychiatric: She has a normal mood and affect. Her speech is normal and behavior is normal. Cognition and memory are normal.   Nursing note and vitals reviewed.      Results for orders placed or performed in visit on 07/29/19   Vitamin B12   Result Value Ref Range    Vitamin B-12 320 239 - 931 pg/mL   Folate   Result Value Ref Range    Folate >20.00 4.78 - 24.20 ng/mL   Methylmalonic Acid, Serum   Result Value Ref Range    Methylmalonic Acid 446 (H) 0 - 378 nmol/L    Disclaimer: Comment    Vitamin B6 (Pyridoxine)   Result Value Ref Range    Vitamin B6 13.6 2.0 - 32.8 ug/L   Magnesium   Result Value Ref Range    Magnesium 1.9 1.4 - 2.2 mg/dL   TSH   Result Value Ref Range    TSH 5.520 (H) 0.470 - 4.680 mIU/mL   Comprehensive Metabolic Panel   Result Value Ref Range    Glucose 103 (H) 70 - 100 mg/dL    BUN 34 (H) 5 - 21 mg/dL    Creatinine 1.03 0.50 - 1.40 mg/dL    Sodium 136 135 - 145 mmol/L    Potassium 3.9 3.5 - 5.3 mmol/L    Chloride 101 98 - 110 mmol/L    CO2 28.0 24.0 - 31.0 mmol/L    Calcium 10.0 8.4 - 10.4 mg/dL    Total Protein 7.2 6.3 - 8.7 g/dL    Albumin 4.10 3.50 - 5.00 g/dL    ALT (SGPT) <15 0 - 54 U/L    AST (SGOT) 22 7 - 45 U/L    Alkaline Phosphatase 72 24 - 120 U/L    Total Bilirubin 0.4 0.1 - 1.0 mg/dL    eGFR Non African Amer 52 (L) >60 mL/min/1.73    Globulin 3.1 gm/dL    A/G Ratio 1.3 1.1 - 2.5 g/dL    BUN/Creatinine Ratio 33.0 (H) 7.0 - 25.0    Anion Gap 7.0 4.0 - 13.0 mmol/L   Ammonia   Result Value Ref Range    Ammonia <9 (L) 9 - 33 umol/L   CBC Auto Differential   Result Value Ref Range    WBC 4.99 4.80 - 10.80 10*3/mm3    RBC 3.42 (L) 4.20 - 5.40 10*6/mm3    Hemoglobin 10.0 (L) 12.0 - 16.0 g/dL    Hematocrit 30.1 (L) 37.0 - 47.0 %    MCV 88.0 82.0 - 98.0 fL    MCH 29.2 28.0 - 32.0 pg    MCHC 33.2 33.0 - 36.0 g/dL    RDW 13.6 12.0 - 15.0 %    RDW-SD 43.5 40.0 - 54.0 fl    MPV 11.3 6.0 - 12.0 fL    Platelets 154 130 - 400 10*3/mm3    Neutrophil %  "68.0 39.0 - 78.0 %    Lymphocyte % 19.2 15.0 - 45.0 %    Monocyte % 9.4 4.0 - 12.0 %    Eosinophil % 2.6 0.0 - 4.0 %    Basophil % 0.4 0.0 - 2.0 %    Immature Grans % 0.4 0.0 - 5.0 %    Neutrophils, Absolute 3.39 1.87 - 8.40 10*3/mm3    Lymphocytes, Absolute 0.96 0.72 - 4.86 10*3/mm3    Monocytes, Absolute 0.47 0.19 - 1.30 10*3/mm3    Eosinophils, Absolute 0.13 0.00 - 0.70 10*3/mm3    Basophils, Absolute 0.02 0.00 - 0.20 10*3/mm3    Immature Grans, Absolute 0.02 0.00 - 0.05 10*3/mm3    nRBC 0.0 0.0 - 0.2 /100 WBC        ST MEMORY EVAL:  Patient was seen today for a memory evaluation. She complains of forgetfulness over the past 2-4 years, gradually progressing. She attributes the change to age. She states she is not concerned by it.   Patient was alert and cooperative. She appeared to put forth good effort on testing tasks. Difficulty was noted in the area of visuospatial tasks. Patient stated she felt \"rushed\" on the figure copy task despite being given instruction to take her time and be neat. Patient stated she does not drive but wants to go get her 's license renewed. See below for RBANS scores.      RBANS: The Repeatable Battery for the Assessment of Neuropsychological Status (RBANS) assesses patient function in the areas of Immediate and Delayed memory, visuospatial/constructional skills, language and attention. It is used to detect and track neurocognitive deficits.    Index score Percentile Qualitative Description   Immediate Memory 90 25 Average   Visuospatial 78 7 Borderline   Language 108 70 Average   Attention 94 34 Average   Delayed Memory 81 10 Low Average   Total Scale 86 18 Low Average   Comments:      EEG:  RESULTS: Background active the record is 10-11 sometimes 11 to 12 Hz in the posterior head region bilaterally.  Photic stimulation produces some driving response at certain frequencies.  Hyperventilation not done.  Patient goes to sleep with further generalized slowing the background " activity and vertex activity seen.     IMPRESSION: Normal EEG awake and drowsy and light sleep        ASSESSMENT/PLAN    Diagnoses and all orders for this visit:    Altered mental status, unspecified altered mental status type    Chronic intractable headache, unspecified headache type    Medication overuse headache    RAFAL (obstructive sleep apnea)  -     Overnight Sleep Oximetry Study; Future    Impaired memory    Other orders  -     carvedilol (COREG) 12.5 MG tablet; Take 1 tablet by mouth Daily.  -     memantine (NAMENDA) 5 MG tablet; Take 1 tablet by mouth 2 (Two) Times a Day.      MEDICAL DECISION MAKIN. Patient likely experiencing medication over use HA taking imitrex daily for years as well as ibuprofen and ASA daily. She is to stop ASA and ibuprofen for at least 6 weeks. She takes oxycodone for chronic pain.   2. Will start Namenda 5 mg BID for HA and migraine PPX and based upon  memory evaluation she also has low average impaired memory.  Counseled on side effects.  3. Patient states sinemet 25/100 nightly for RLS is effective. No evidence of PD seen on exam. May consider transition to Neupro for RLS if nightly sinemet becomes ineffective.  4.will get overnight pulse ox. Patient declines POLYSOMNOGRAM.  5. Patient was prescribed rollerator which did purchase and recommend that she use  6. No driving  7. Patient's Body mass index is 35.48 kg/m². BMI is above normal parameters. Recommendations include: educational material.    8. AMS and encephalopathy resolved and patient return to baseline.     HPI and ROS reviewed and updated.      allergies and all known medications/prescriptions have been reviewed using resources available on this encounter.    Return in about 6 weeks (around 2019).        MORGAN Srivastava

## 2019-10-14 RX ORDER — SUMATRIPTAN 50 MG/1
50 TABLET, FILM COATED ORAL 2 TIMES DAILY PRN
Qty: 180 TABLET | Refills: 1 | Status: SHIPPED | OUTPATIENT
Start: 2019-10-14 | End: 2020-01-22 | Stop reason: SDUPTHER

## 2019-10-28 ENCOUNTER — TELEPHONE (OUTPATIENT)
Dept: INTERNAL MEDICINE | Age: 79
End: 2019-10-28

## 2019-11-13 ENCOUNTER — OFFICE VISIT (OUTPATIENT)
Dept: OBSTETRICS AND GYNECOLOGY | Facility: CLINIC | Age: 79
End: 2019-11-13

## 2019-11-13 VITALS
DIASTOLIC BLOOD PRESSURE: 72 MMHG | SYSTOLIC BLOOD PRESSURE: 130 MMHG | WEIGHT: 202 LBS | HEIGHT: 62 IN | BODY MASS INDEX: 37.17 KG/M2

## 2019-11-13 DIAGNOSIS — F51.01 PRIMARY INSOMNIA: ICD-10-CM

## 2019-11-13 DIAGNOSIS — F41.9 ANXIETY: Primary | ICD-10-CM

## 2019-11-13 PROCEDURE — 99213 OFFICE O/P EST LOW 20 MIN: CPT | Performed by: OBSTETRICS & GYNECOLOGY

## 2019-11-13 RX ORDER — VENLAFAXINE 50 MG/1
50 TABLET ORAL 2 TIMES DAILY
Qty: 60 TABLET | Refills: 2 | Status: SHIPPED | OUTPATIENT
Start: 2019-11-13 | End: 2020-02-11 | Stop reason: SDUPTHER

## 2019-11-13 RX ORDER — ALPRAZOLAM 0.5 MG/1
0.5 TABLET ORAL NIGHTLY PRN
Qty: 30 TABLET | Refills: 2 | Status: SHIPPED | OUTPATIENT
Start: 2019-11-13 | End: 2019-12-06 | Stop reason: SDUPTHER

## 2019-11-13 RX ORDER — ZOSTER VACCINE RECOMBINANT, ADJUVANTED 50 MCG/0.5
KIT INTRAMUSCULAR
COMMUNITY
Start: 2019-10-10 | End: 2021-08-13

## 2019-11-13 NOTE — PROGRESS NOTES
"Subjective   Chief Complaint   Patient presents with   • Med Refill     pt here today for med check on xanax. pt wanting to discuss. pt voices no other concerns.      Jennifer Gomes is a 79 y.o. year old .  No LMP recorded (lmp unknown). Patient has had a hysterectomy.  She presents to be seen for follow-up of anxiety.  Patient reports that Xanax in the evenings has been helping her sleep most of the time.  She has been strongly cautioned by her daughter not to take oxycodone and Xanax at the same time due to possible respiratory depression, so she never does this.  Patient has been advised that she could probably take her Xanax with half of an oxycodone tablet and be fine, but it still still makes her nervous.  The patient has tried Effexor that she obtained from someone else and feels like that is helpful for her anxiety.  She would like to take that as a regular medication.  Overall, the patient feels like she has done well since the addition of the Xanax.    The following portions of the patient's history were reviewed and updated as appropriate:current medications and allergies    Social History    Tobacco Use      Smoking status: Never Smoker      Smokeless tobacco: Never Used    Review of Systems   Constitutional: Negative for activity change.   Eyes: Positive for visual disturbance (wears glasses).   Respiratory: Negative for shortness of breath.    Cardiovascular: Negative for chest pain.   Gastrointestinal: Negative for abdominal pain, blood in stool, constipation and diarrhea.   Genitourinary: Negative for difficulty urinating and enuresis (used to be a problem, has since resolved).   Musculoskeletal: Positive for arthralgias, back pain and gait problem.   Skin: Negative for rash.   Neurological:        No recent falls   Psychiatric/Behavioral: Positive for sleep disturbance. The patient is nervous/anxious.          Objective   /72   Ht 157.5 cm (62\")   Wt 91.6 kg (202 lb)   LMP  (LMP " Unknown)   BMI 36.95 kg/m²     Physical Exam   Constitutional: She is oriented to person, place, and time. She appears well-developed and well-nourished. No distress.   HENT:   Head: Normocephalic and atraumatic.   Eyes: EOM are normal.   Neck: Normal range of motion.   Pulmonary/Chest: Effort normal.   Abdominal: Soft. She exhibits no distension. There is no tenderness.   Musculoskeletal: Normal range of motion.   Neurological: She is alert and oriented to person, place, and time.   Skin: Skin is warm and dry.   Psychiatric: She has a normal mood and affect. Her behavior is normal. Judgment normal.   Nursing note and vitals reviewed.      Lab Review   No data reviewed    Imaging   No data reviewed     Assessment & Plan    Jennifer was seen today for med refill.    Diagnoses and all orders for this visit:    Anxiety: Continue same dose of xanax 0.5 mg qhs and add Effexor.  RTO in 3 months  -     ALPRAZolam (XANAX) 0.5 MG tablet; Take 1 tablet by mouth At Night As Needed for Anxiety.  -     venlafaxine (EFFEXOR) 50 MG tablet; Take 1 tablet by mouth 2 (Two) Times a Day.    Primary insomnia  -     ALPRAZolam (XANAX) 0.5 MG tablet; Take 1 tablet by mouth At Night As Needed for Anxiety.      This note was electronically signed.    Shasta Madrigal MD  November 13, 2019  11:02 AM    Total time spent today with Jennifer  was 20 minutes (level 3).  Greater than 50% of the time was spent coordinating care, answering her questions and counseling regarding pathophysiology of her presenting problem along with plans for any diagnositc work-up and treatment.

## 2019-11-18 RX ORDER — CYCLOBENZAPRINE HCL 10 MG
TABLET ORAL
Qty: 90 TABLET | Refills: 1 | Status: SHIPPED | OUTPATIENT
Start: 2019-11-18 | End: 2020-01-22 | Stop reason: CLARIF

## 2019-11-25 ENCOUNTER — TELEPHONE (OUTPATIENT)
Dept: OBSTETRICS AND GYNECOLOGY | Facility: CLINIC | Age: 79
End: 2019-11-25

## 2019-11-25 NOTE — TELEPHONE ENCOUNTER
To Dr Madrigal; Jennifer called and states you have given her Xanax 0.5mg.  States she took one Xanax before she went to bed and she woke up at 3:00am and took another one.  States she slept well and didn't wake up with a headache.  Jennifer is asking if you would give her a script for 1.5 Xanax.

## 2019-11-27 NOTE — TELEPHONE ENCOUNTER
Please advise patient to take two pills together at bedtime (so total of 1 mg) for a few nights.  If that dose works well for her, then I will send her new script for 1 mg tabs.

## 2019-12-06 DIAGNOSIS — F51.01 PRIMARY INSOMNIA: ICD-10-CM

## 2019-12-06 DIAGNOSIS — F41.9 ANXIETY: ICD-10-CM

## 2019-12-06 RX ORDER — ALPRAZOLAM 0.5 MG/1
0.5 TABLET ORAL 2 TIMES DAILY
Qty: 60 TABLET | Refills: 2 | Status: SHIPPED | OUTPATIENT
Start: 2019-12-06 | End: 2020-02-11 | Stop reason: SDUPTHER

## 2019-12-18 ENCOUNTER — OFFICE VISIT (OUTPATIENT)
Dept: NEUROLOGY | Facility: CLINIC | Age: 79
End: 2019-12-18

## 2019-12-18 VITALS
DIASTOLIC BLOOD PRESSURE: 88 MMHG | SYSTOLIC BLOOD PRESSURE: 148 MMHG | OXYGEN SATURATION: 98 % | HEART RATE: 87 BPM | BODY MASS INDEX: 36.8 KG/M2 | HEIGHT: 62 IN | WEIGHT: 200 LBS

## 2019-12-18 DIAGNOSIS — G47.33 OSA (OBSTRUCTIVE SLEEP APNEA): ICD-10-CM

## 2019-12-18 DIAGNOSIS — Z79.891 CHRONIC PRESCRIPTION OPIATE USE: ICD-10-CM

## 2019-12-18 DIAGNOSIS — Z79.899 CHRONIC PRESCRIPTION BENZODIAZEPINE USE: ICD-10-CM

## 2019-12-18 DIAGNOSIS — G31.84 MILD COGNITIVE IMPAIRMENT: Primary | ICD-10-CM

## 2019-12-18 DIAGNOSIS — F33.2 SEVERE EPISODE OF RECURRENT MAJOR DEPRESSIVE DISORDER, WITHOUT PSYCHOTIC FEATURES (HCC): ICD-10-CM

## 2019-12-18 DIAGNOSIS — G89.4 CHRONIC PAIN SYNDROME: ICD-10-CM

## 2019-12-18 PROCEDURE — 99214 OFFICE O/P EST MOD 30 MIN: CPT | Performed by: PHYSICIAN ASSISTANT

## 2019-12-18 NOTE — PROGRESS NOTES
"  Neurology Progress Note      Chief Complaint:    Chronic pain syndrome  Headache disorder  Chronic narcotic use  Chronic benzodiazepine use  Obstructive sleep apnea, untreated  DDD lumbar    Subjective     Subjective:  This is a 79-year-old right-hand-dominant female who has a longstanding history of chronic pain disorder having been on relatively high-dose oxycodone for more than a decade for chronic back pain and leg pain.  She has been treated with Sinemet for restless leg symptoms, however, clearly has degenerative disc disease with significant central canal stenosis at L4-5 with some instability.  In addition to chronic opiate use, her gynecologist recently placed her on Xanax 0.5 mg twice daily.  She states that \"her life is a mess and has a lot of family issues\" and that these will \"never get better.\"  She states that the Xanax has cured her headaches.  At the same time, she continues with oxycodone 15 mg twice daily, but many times will take it more often.  She also has following with Dr. love, and pain management, but has  discontinued epidural steroid injections.  She still takes Fioricet for headache almost daily.  She also has a history of obstructive sleep apnea, but states that she will not use CPAP any longer because she cannot tolerate the mask.  She states that the Xanax helps her sleep.    She was seen in consultation back in April for some episodes of altered consciousness.  He was felt to be secondary to medication overuse and some medication-seeking behaviors.  She has had no evidence of any focal neurologic or organic etiology to this from a neurology standpoint.    The patient does admit to depression and anxiety that is not well-controlled.  She denies any suicidal or homicidal ideation.    Past Medical History:   Diagnosis Date   • Anxiety    • Arthritis    • Cancer (CMS/HCC)     uterine   • Chronic pain    • Depression    • Disease of thyroid gland    • Fibromyalgia    • Headache    • " Hyperlipidemia    • Hypertension    • Incontinence    • Insomnia    • Leg pain    • Lumbar stenosis    • Peptic ulcer    • Restless legs    • Vaginal bleeding      Past Surgical History:   Procedure Laterality Date   • BLADDER REPAIR  2011    MESH HAD TO BE REMOVED IN 2013   • BREAST BIOPSY Right 2017    benign   • BREAST CYST EXCISION Left 1982   • CARDIAC CATHETERIZATION     • CARPAL TUNNEL RELEASE     • CATARACT EXTRACTION W/ INTRAOCULAR LENS  IMPLANT, BILATERAL     • CYSTECTOMY     • D&C HYSTEROSCOPY N/A 11/6/2017    Procedure: DILATATION AND CURETTAGE HYSTEROSCOPY;  Surgeon: Shasta Madrigal MD;  Location: [facility] OR;  Service:    • DILATION AND CURETTAGE, DIAGNOSTIC / THERAPEUTIC  2008   • ENDOSCOPY  09/23/2010    Short segment of Arriola's,Moderate chroninc esophagogastritis and negative H.pylori   • ENDOSCOPY N/A 9/25/2017    Procedure: ESOPHAGOGASTRODUODENOSCOPY WITH ANESTHESIA;  Surgeon: Tom Velasco DO;  Location: [facility] ENDOSCOPY;  Service:    • HYSTERECTOMY  12/20/2017   • ORIF TIBIA/FIBULA FRACTURES Left 2000   • TRANSVAGINAL TAPING SUSPENSION N/A 11/6/2017    Procedure: VAGINAL MESH REVISION;  Surgeon: Shasta Madrigal MD;  Location:  [facility] OR;  Service:    • VAGINAL MESH REVISION  2013     Family History   Problem Relation Age of Onset   • Diabetes Mother    • Multiple myeloma Mother    • Diabetes Sister    • Ovarian cancer Paternal Aunt    • Prostate cancer Brother    • Lymphoma Brother         NHL   • Cancer Paternal Grandmother         metastatic   • Lung cancer Paternal Grandfather    • Colon cancer Neg Hx    • Esophageal cancer Neg Hx    • Breast cancer Neg Hx      Social History     Tobacco Use   • Smoking status: Never Smoker   • Smokeless tobacco: Never Used   Substance Use Topics   • Alcohol use: No   • Drug use: No       Medications:  Current Outpatient Medications   Medication Sig Dispense Refill   • ALPRAZolam (XANAX) 0.5 MG tablet Take 1 tablet by mouth 2 (Two) Times a Day. 60 tablet 2    • bumetanide (BUMEX) 1 MG tablet Take 1 tablet by mouth Daily. 15 tablet 0   • Butalbital-APAP-Caffeine (FIORICET PO) Take  by mouth.     • carbidopa-levodopa (SINEMET)  MG per tablet Take 1 tablet by mouth 4 (Four) Times a Day.     • carvedilol (COREG) 12.5 MG tablet Take 1 tablet by mouth Daily.     • cyclobenzaprine (FLEXERIL) 10 MG tablet Take 10 mg by mouth 2 (Two) Times a Day As Needed for Muscle Spasms.     • ergocalciferol (ERGOCALCIFEROL) 76105 units capsule Take 50,000 Units by mouth.     • levothyroxine (SYNTHROID, LEVOTHROID) 50 MCG tablet Take 50 mcg by mouth Every Morning.     • oxyCODONE HCl 15 MG tablet  Take 15 mg by mouth 2 (Two) Times a Day.     • SHINGRIX 50 MCG/0.5ML reconstituted suspension      • SUMAtriptan (IMITREX) 50 MG tablet Take 1 tablet by mouth 2 (Two) Times a Day As Needed for Migraine. 30 tablet 3   • venlafaxine (EFFEXOR) 50 MG tablet Take 1 tablet by mouth 2 (Two) Times a Day. 60 tablet 2     No current facility-administered medications for this visit.        Allergies:    Ropinirole hcl; Codeine; Definity [perflutren lipid microsphere]; Ambien [zolpidem]; Eszopiclone; Pregabalin; and Tizanidine    Review of Systems:   -A 14 point review of systems is completed and is negative.      Objective      Vital Signs  Heart Rate:  [87] 87  BP: (148)/(88) 148/88    Physical Exam:    General Exam:  Head:  Normocephalic, atraumatic.  HEENT: PERRLA.  Full EOM.  Neck:  No lymphadenopathy, thyromegaly or bruit.  Cardiac:  Regular rate and rhythm.  Normal S1, S2.  No murmur, rub or gallop.  Lungs:  Clear to auscultation bilaterally.  No wheeze, rales or rhonchi.  Abdomen:  Non-tender, Non-distended.  Bowel sounds normoactive.  Extremities: Full peripheral pulses.  No clubbing, cyanosis or edema.  Skin: No ulceration, breakdown or rash.      LUMBAR SPINE EXAMINATION:  STRENGTH:  Strength is 5/5 of the bilateral hip flexors, quadriceps, hamstrings, anterior tibialis, extensor hallucis  longus, and gastroc-soleus complex.  SENSATION:  Sensitive to light touch in all nerve root distributions of the bilateral lower extremities.  Sharp touch is intact likewise.  REFLEXES: DTRs are 2+ bilaterally at the patella and Achilles.  Clonus is absent.       Sensory:  -Intact to light touch, pinprick BUE (C5-T1) and BLE (L2-S1).     Gait  -No signs of ataxia  -ambulates with a single-prong cane in both hands and a markedly forward-stooped posture.       Results Review:    I reviewed the patient's new clinical results and findings.      No components found for: A1C  Lab Results   Component Value Date    HDL 55 (L) 09/23/2019     09/23/2019     No components found for: B12  Lab Results   Component Value Date    TSH 2.210 09/23/2019       Assessment/Plan     Impression:  1.  Chronic pain disorder  2.  Headache disorder, reportedly resolved with Xanax  3.  Depressive disorder  4.  DDD lumbar with chronic lumbar radiculopathy  5.  Bilateral leg pain not felt to be restless leg  6.  Lumbar stenosis L4-5  7.  Chronic opioid use  8.  Chronic benzodiazepine use  9.  Obstructive sleep apnea, untreated      Plan:  1.  I reviewed the clinical and radiographic findings with the patient in detail.  I believe she can discontinue the Namenda as this has not significantly impacted her headaches and her headaches reportedly have resolved with the use of Xanax.  Certainly, I do not advocate for the chronic use of Xanax from its neurocognitive impairment and increased risk of dementia.  At the same time, I will defer this to the prescribing physician.    2.  I do not believe that the patient truly needs Sinemet as she does not have a condition that should be truly responsive to a dopaminergic agent such as this, however, I believe her leg symptoms are clearly a result of her lumbar spinal stenosis.  Although she is 79 and disabled and has had a difficulty with chronic opioid use, she still may benefit from decompression and  "fixation at L4-5.  I would encourage primary care to consider referring her for surgical opinion once again.    3.  I still would recommend treating her obstructive sleep apnea, however, the patient is somewhat ambivalent regarding all of her health needs including the risks of chronic opioids, benzodiazepines and untreated sleep apnea.  None of these seem to be of particular interest to her in addressing.  In addition, she requests that I treat a bronchitis today, however, she has had this now for several weeks and refuses to contact her primary care provider stating, \"it will take me months to get in there.\"    4.  From a neurology standpoint, the patient does not need to follow-up with us on a regular basis.  Her headaches seem to be secondary to medication overuse.  Again, I recommend discontinuing the Namenda.  I would not keep her on this medication.  In addition, I have reassured her that this is not any sign of dementia and, rather, is likely result of a multiplicity of factors including depression, chronic opioid and benzodiazepine use and her chronic pain and untreated sleep apnea.    The patient voices understanding and agreement with the plan of care and will call for concerns or questions in the interim.  We reviewed pertinent diagnostic studies and the potential risks and benefits of the prescribed regimen of treatment.    We discussed compliance of the prescribed treatment regimen and instructions on medication, therapy, physical activity, etc. and potential for improvement and impact these have on their healing/recovery and risk reduction in the future.    I spent greater than 25 minutes in direct face to face contact with the patient with greater than 50% of the time being spent in education and counseling.          Adis Murillo PA-C  12/18/19  3:46 PM    "

## 2020-01-02 ENCOUNTER — TELEPHONE (OUTPATIENT)
Dept: INTERNAL MEDICINE | Age: 80
End: 2020-01-02

## 2020-01-02 NOTE — TELEPHONE ENCOUNTER
Randi, pt called to get a refill on her Fioricet. It doesn't look like it has been filled in a while.

## 2020-01-21 DIAGNOSIS — Z00.00 HEALTHCARE MAINTENANCE: ICD-10-CM

## 2020-01-21 DIAGNOSIS — I10 ESSENTIAL HYPERTENSION: ICD-10-CM

## 2020-01-21 LAB
ALBUMIN SERPL-MCNC: 3.9 G/DL (ref 3.5–5.2)
ALP BLD-CCNC: 78 U/L (ref 35–104)
ALT SERPL-CCNC: <5 U/L (ref 5–33)
ANION GAP SERPL CALCULATED.3IONS-SCNC: 16 MMOL/L (ref 7–19)
AST SERPL-CCNC: 15 U/L (ref 5–32)
BASOPHILS ABSOLUTE: 0 K/UL (ref 0–0.2)
BASOPHILS RELATIVE PERCENT: 0.4 % (ref 0–1)
BILIRUB SERPL-MCNC: 0.3 MG/DL (ref 0.2–1.2)
BUN BLDV-MCNC: 25 MG/DL (ref 8–23)
CALCIUM SERPL-MCNC: 9.3 MG/DL (ref 8.8–10.2)
CHLORIDE BLD-SCNC: 103 MMOL/L (ref 98–111)
CHOLESTEROL, TOTAL: 237 MG/DL (ref 160–199)
CO2: 23 MMOL/L (ref 22–29)
CREAT SERPL-MCNC: 0.8 MG/DL (ref 0.5–0.9)
EOSINOPHILS ABSOLUTE: 0.3 K/UL (ref 0–0.6)
EOSINOPHILS RELATIVE PERCENT: 5.3 % (ref 0–5)
GFR NON-AFRICAN AMERICAN: >60
GLUCOSE BLD-MCNC: 105 MG/DL (ref 74–109)
HCT VFR BLD CALC: 38 % (ref 37–47)
HDLC SERPL-MCNC: 64 MG/DL (ref 65–121)
HEMOGLOBIN: 12.1 G/DL (ref 12–16)
IMMATURE GRANULOCYTES #: 0 K/UL
LDL CHOLESTEROL CALCULATED: 153 MG/DL
LYMPHOCYTES ABSOLUTE: 1.3 K/UL (ref 1.1–4.5)
LYMPHOCYTES RELATIVE PERCENT: 25.8 % (ref 20–40)
MCH RBC QN AUTO: 30.3 PG (ref 27–31)
MCHC RBC AUTO-ENTMCNC: 31.8 G/DL (ref 33–37)
MCV RBC AUTO: 95.2 FL (ref 81–99)
MONOCYTES ABSOLUTE: 0.4 K/UL (ref 0–0.9)
MONOCYTES RELATIVE PERCENT: 8.9 % (ref 0–10)
NEUTROPHILS ABSOLUTE: 2.9 K/UL (ref 1.5–7.5)
NEUTROPHILS RELATIVE PERCENT: 59.2 % (ref 50–65)
PDW BLD-RTO: 12.7 % (ref 11.5–14.5)
PLATELET # BLD: 148 K/UL (ref 130–400)
PMV BLD AUTO: 11.3 FL (ref 9.4–12.3)
POTASSIUM SERPL-SCNC: 4.2 MMOL/L (ref 3.5–5)
RBC # BLD: 3.99 M/UL (ref 4.2–5.4)
SODIUM BLD-SCNC: 142 MMOL/L (ref 136–145)
TOTAL PROTEIN: 6.9 G/DL (ref 6.6–8.7)
TRIGL SERPL-MCNC: 102 MG/DL (ref 0–149)
TSH SERPL DL<=0.05 MIU/L-ACNC: 2.6 UIU/ML (ref 0.27–4.2)
WBC # BLD: 4.9 K/UL (ref 4.8–10.8)

## 2020-01-22 ENCOUNTER — OFFICE VISIT (OUTPATIENT)
Dept: INTERNAL MEDICINE | Age: 80
End: 2020-01-22
Payer: MEDICARE

## 2020-01-22 VITALS
SYSTOLIC BLOOD PRESSURE: 132 MMHG | DIASTOLIC BLOOD PRESSURE: 71 MMHG | WEIGHT: 200 LBS | HEIGHT: 62 IN | BODY MASS INDEX: 36.8 KG/M2 | HEART RATE: 87 BPM

## 2020-01-22 LAB
APPEARANCE FLUID: CLEAR
BILIRUBIN, POC: NEGATIVE
BLOOD URINE, POC: NORMAL
CLARITY, POC: CLEAR
COLOR, POC: YELLOW
GLUCOSE URINE, POC: NEGATIVE
KETONES, POC: NEGATIVE
LEUKOCYTE EST, POC: NEGATIVE
NITRITE, POC: NEGATIVE
PH, POC: 5.5
PROTEIN, POC: NEGATIVE
SPECIFIC GRAVITY, POC: 1.02
UROBILINOGEN, POC: 0.2

## 2020-01-22 PROCEDURE — 99214 OFFICE O/P EST MOD 30 MIN: CPT | Performed by: NURSE PRACTITIONER

## 2020-01-22 PROCEDURE — G8482 FLU IMMUNIZE ORDER/ADMIN: HCPCS | Performed by: NURSE PRACTITIONER

## 2020-01-22 PROCEDURE — G8427 DOCREV CUR MEDS BY ELIG CLIN: HCPCS | Performed by: NURSE PRACTITIONER

## 2020-01-22 PROCEDURE — 1123F ACP DISCUSS/DSCN MKR DOCD: CPT | Performed by: NURSE PRACTITIONER

## 2020-01-22 PROCEDURE — G8417 CALC BMI ABV UP PARAM F/U: HCPCS | Performed by: NURSE PRACTITIONER

## 2020-01-22 PROCEDURE — 1036F TOBACCO NON-USER: CPT | Performed by: NURSE PRACTITIONER

## 2020-01-22 PROCEDURE — 1090F PRES/ABSN URINE INCON ASSESS: CPT | Performed by: NURSE PRACTITIONER

## 2020-01-22 PROCEDURE — 4040F PNEUMOC VAC/ADMIN/RCVD: CPT | Performed by: NURSE PRACTITIONER

## 2020-01-22 PROCEDURE — 81002 URINALYSIS NONAUTO W/O SCOPE: CPT | Performed by: NURSE PRACTITIONER

## 2020-01-22 PROCEDURE — G8400 PT W/DXA NO RESULTS DOC: HCPCS | Performed by: NURSE PRACTITIONER

## 2020-01-22 RX ORDER — CHLORAL HYDRATE 500 MG
2000 CAPSULE ORAL 2 TIMES DAILY
Qty: 180 CAPSULE | Refills: 2 | Status: SHIPPED | OUTPATIENT
Start: 2020-01-22 | End: 2021-07-13 | Stop reason: SDUPTHER

## 2020-01-22 RX ORDER — CARVEDILOL 12.5 MG/1
TABLET ORAL
Qty: 180 TABLET | Refills: 4 | Status: SHIPPED | OUTPATIENT
Start: 2020-01-22 | End: 2020-06-29 | Stop reason: SDUPTHER

## 2020-01-22 RX ORDER — ERGOCALCIFEROL (VITAMIN D2) 1250 MCG
50000 CAPSULE ORAL WEEKLY
Qty: 12 CAPSULE | Refills: 3 | Status: SHIPPED | OUTPATIENT
Start: 2020-01-22 | End: 2020-06-29 | Stop reason: SDUPTHER

## 2020-01-22 RX ORDER — LEVOTHYROXINE SODIUM 0.05 MG/1
TABLET ORAL
Qty: 90 TABLET | Refills: 3 | Status: SHIPPED | OUTPATIENT
Start: 2020-01-22 | End: 2021-03-01

## 2020-01-22 RX ORDER — CYCLOBENZAPRINE HCL 10 MG
10 TABLET ORAL 2 TIMES DAILY PRN
COMMUNITY
End: 2020-01-22 | Stop reason: SDUPTHER

## 2020-01-22 RX ORDER — OMEPRAZOLE 10 MG/1
10 CAPSULE, DELAYED RELEASE ORAL DAILY
COMMUNITY
End: 2020-01-22 | Stop reason: SDUPTHER

## 2020-01-22 RX ORDER — SUMATRIPTAN 50 MG/1
50 TABLET, FILM COATED ORAL 2 TIMES DAILY PRN
Qty: 180 TABLET | Refills: 1 | Status: SHIPPED | OUTPATIENT
Start: 2020-01-22 | End: 2020-06-29 | Stop reason: SDUPTHER

## 2020-01-22 RX ORDER — ATORVASTATIN CALCIUM 40 MG/1
TABLET, FILM COATED ORAL
Qty: 90 TABLET | Refills: 2 | Status: SHIPPED | OUTPATIENT
Start: 2020-01-22 | End: 2020-06-29 | Stop reason: SDUPTHER

## 2020-01-22 RX ORDER — OMEPRAZOLE 10 MG/1
10 CAPSULE, DELAYED RELEASE ORAL DAILY
Qty: 90 CAPSULE | Refills: 1 | Status: SHIPPED | OUTPATIENT
Start: 2020-01-22 | End: 2020-06-29

## 2020-01-22 RX ORDER — CYCLOBENZAPRINE HCL 10 MG
TABLET ORAL
Qty: 90 TABLET | Refills: 1 | Status: CANCELLED | OUTPATIENT
Start: 2020-01-22

## 2020-01-22 RX ORDER — CYCLOBENZAPRINE HCL 10 MG
10 TABLET ORAL 2 TIMES DAILY PRN
Qty: 180 TABLET | Refills: 1 | Status: SHIPPED | OUTPATIENT
Start: 2020-01-22 | End: 2020-05-26 | Stop reason: SDUPTHER

## 2020-01-22 ASSESSMENT — ENCOUNTER SYMPTOMS
COUGH: 0
CHOKING: 0
DIARRHEA: 0
ABDOMINAL DISTENTION: 0
EYE ITCHING: 0
BLOOD IN STOOL: 0
COLOR CHANGE: 0
BACK PAIN: 1
VOMITING: 0
STRIDOR: 0
ABDOMINAL PAIN: 0
TROUBLE SWALLOWING: 0
SHORTNESS OF BREATH: 0
WHEEZING: 0
NAUSEA: 0
SORE THROAT: 0
EYE DISCHARGE: 0
CONSTIPATION: 0

## 2020-01-22 ASSESSMENT — LIFESTYLE VARIABLES: HOW OFTEN DO YOU HAVE A DRINK CONTAINING ALCOHOL: 0

## 2020-01-22 ASSESSMENT — PATIENT HEALTH QUESTIONNAIRE - PHQ9
SUM OF ALL RESPONSES TO PHQ QUESTIONS 1-9: 0
SUM OF ALL RESPONSES TO PHQ QUESTIONS 1-9: 0

## 2020-01-22 NOTE — PROGRESS NOTES
[facility] INTERNAL MEDICINE  [address] [address]  [address] [address]  Dept: [phone]  Dept Fax: [phone]  Loc: [phone]    Gerhard Wong is a 70 y.o. female who presents today for her medical conditions/complaints as noted below. Gerhard Wong is c/maame Medicare AWV (Patient is here for [employer] Visit ) and Hypertension (Patient is here for routine follow up visit 1/21/20)        HPI:     HPI   1. HTN:  Stable on current meds; No side effects of the meds; Takes as directed; takes blood pressure 3-4 times a week   2. Urgency; So some times she has frequency; so she has no dysuria she feels like this urgency has gotten worse after hysterectomy which is likely the case with weakened pelvic floor muscles  3. Hypothyroidism;  ;  Stable  ;takes meds as directed; Has had no side effects of the meds;   4.  Gyn;  Gives her effexor and xanax; They do her mammogram and bone density   5. Chronic pain at pain management;   She sees DR Yung Wilkerson gets sinemet and fiorcet and oxy  #6 vitamin D deficiency she currently is on 50,000 units weekly levels good today no changes are necessary  Chief Complaint   Patient presents with    Medicare AWV     Patient is here for Medicare Wellness Visit     Hypertension     Patient is here for routine follow up visit 1/21/20       Past Medical History:   Diagnosis Date    Adenocarcinoma of endometrium, stage 1 ([address] 11/14/2017    Anemia     Arthritis     CAD (coronary artery disease)     \"Stiff Valve\"    Chronic kidney disease     Depression     Fibrocystic breast disease     5/2/17 biopsy - benign FCBD w/microcalcifications - [facility]     GERD (gastroesophageal reflux disease)     H/O vaginal bleeding     managed Dr Lizzy Davis Hypertension     Hypothyroidism (acquired)     Hypothyroidism (acquired)     Mixed hyperlipidemia     Obesity     Osteoarthritis     Pain of both hip joints 1/18/2016    Situational anxiety     Sleep apnea     Vitamin D deficiency       Past Surgical History:   Procedure Laterality Date    BREAST BIOPSY Right     BREAST SURGERY Left     Biopsy, Benign    CYST REMOVAL Left     Shoulder cyst, benign    DILATION AND CURETTAGE OF UTERUS      EYE SURGERY  03/2017    HYSTERECTOMY      Removed uterus and cervix due to cancer, 12/17    LEG SURGERY Left     Tib/Fib ORIF    TONSILLECTOMY         Vitals 1/22/2020 9/23/2019 5/23/2019 4/30/2019 4/17/2019 4/50/6450   SYSTOLIC [phone] [phone]   DIASTOLIC 71 78 66 82 525 264   Site - - Left Upper Arm - - -   Position - - Sitting - - -   Pulse 87 88 100 84 - 91   Temp - - 97.9 - - -   Resp - 18 - 18 - 18   SpO2 - 98 96 95 - 97   Weight 200 lb 194 lb 186 lb 197 lb - 199 lb   Height 5' 2\" 5' 2\" 5' 2\" 5' 6\" - 5' 6\"   BMI (wt*703/ht~2) 36.58 kg/m2 35.48 kg/m2 34.02 kg/m2 31.79 kg/m2 - 32.12 kg/m2   Pain Level - - - - - -   Some recent data might be hidden       Family History   Problem Relation Age of Onset    Cancer Mother        Social History     Tobacco Use    Smoking status: Never Smoker    Smokeless tobacco: Never Used   Substance Use Topics    Alcohol use: Yes     Comment: occassionally      Current Outpatient Medications   Medication Sig Dispense Refill    carvedilol (COREG) 12.5 MG tablet TAKE 1 TABLET TWICE A  tablet 4    levothyroxine (SYNTHROID) 50 MCG tablet TAKE 1 TABLET DAILY 90 tablet 3    atorvastatin (LIPITOR) 40 MG tablet TAKE 1 TABLET DAILY AS DIRECTED (CHANGE PRIMARY CARE PHYSICIAN TO NILDA LINTON FOR ALL FUTURE REFILLS) 90 tablet 2    ergocalciferol (ERGOCALCIFEROL) 1.25 MG (53455 UT) capsule Take 1 capsule by mouth once a week 12 capsule 3    cyclobenzaprine (FLEXERIL) 10 MG tablet Take 1 tablet by mouth 2 times daily as needed for Muscle spasms 180 tablet 1    Omega-3 Fatty Acids (FISH OIL) 1000 MG CAPS Take 2 capsules by mouth 2 times daily 180 capsule 2    SUMAtriptan (IMITREX) 50 MG tablet Take 1 tablet by mouth 2 times daily as needed for Migraine (Max 2/day -- 3 month supply) 180 tablet 1    omeprazole (PRILOSEC) 10 MG delayed release capsule Take 1 capsule by mouth daily 90 capsule 1    oxyCODONE (OXY-IR) 15 MG immediate release tablet       bumetanide (BUMEX) 1 MG tablet Take 1 tablet by mouth daily 90 tablet 1    carbidopa-levodopa (SINEMET)  MG per tablet Take 1 tablet by mouth 4 times daily For restless legs and cramping 360 tablet 0    Prenatal Multivit-Min-Fe-FA (PRENATAL 1 + IRON PO) Take by mouth      loratadine (CLARITIN) 10 MG capsule Take 10 mg by mouth daily      FIBER FORMULA PO Take by mouth       No current facility-administered medications for this visit.       Allergies   Allergen Reactions    Ambien [Zolpidem Tartrate]     Codeine     Lyrica [Pregabalin]     Morphine     Requip [Ropinirole Hcl]     Tizanidine      Terrible nightmares         Health Maintenance   Topic Date Due    DEXA (modify frequency per FRAX score)  09/19/2007    Breast cancer screen  04/17/2019    Annual Wellness Visit (AWV)  05/29/2019    DTaP/Tdap/Td vaccine (1 - Tdap) 02/12/2020 (Originally 9/19/1951)    Shingles Vaccine (2 of 3) 01/22/2021 (Originally 1/15/2014)    Lipid screen  01/21/2021    TSH testing  01/21/2021    Potassium monitoring  01/21/2021    Creatinine monitoring  01/21/2021    Flu vaccine  Completed    Pneumococcal 65+ years Vaccine  Completed       No results found for: LABA1C  No results found for: PSA, PSADIA  TSH   Date Value Ref Range Status   01/21/2020 2.600 0.270 - 4.200 uIU/mL Final   ]  Lab Results   Component Value Date     01/21/2020    K 4.2 01/21/2020     01/21/2020    CO2 23 01/21/2020    BUN 25 (H) 01/21/2020    CREATININE 0.8 01/21/2020    GLUCOSE 105 01/21/2020    CALCIUM 9.3 01/21/2020    PROT 6.9 01/21/2020    LABALBU 3.9 01/21/2020    BILITOT 0.3 01/21/2020    ALKPHOS 78 01/21/2020    AST 15 01/21/2020    ALT <5 (A) 01/21/2020    LABGLOM >60 36.58 kg/m²     Assessment:       Diagnosis Orders   1. Urinary urgency  POCT Urinalysis no Micro   2. Chronic bilateral low back pain without sciatica  External Referral To Physical Therapy   3. Postural kyphosis of thoracolumbar region  External Referral To Physical Therapy   4. Essential hypertension     5. Hypothyroidism (acquired)     6. Chronic pain syndrome     7. Vitamin D deficiency       Labs reviewedfrom 1/21/2020  Diagnostics reviewed from 8/19 normal bone density [facility]:        Patient given educational materials - see patient instructions. Discussed use, benefit, and side effects of prescribed medications. Allpatient questions answered. Pt voiced understanding. Reviewed health maintenance. Instructed to continue current medications, diet and exercise. Patient agreed with treatment plan. Follow up as directed.    MEDICATIONS:  Orders Placed This Encounter   Medications    carvedilol (COREG) 12.5 MG tablet     Sig: TAKE 1 TABLET TWICE A DAY     Dispense:  180 tablet     Refill:  4    levothyroxine (SYNTHROID) 50 MCG tablet     Sig: TAKE 1 TABLET DAILY     Dispense:  90 tablet     Refill:  3    atorvastatin (LIPITOR) 40 MG tablet     Sig: TAKE 1 TABLET DAILY AS DIRECTED (CHANGE PRIMARY CARE PHYSICIAN TO NILDA LINTON FOR ALL FUTURE REFILLS)     Dispense:  90 tablet     Refill:  2    ergocalciferol (ERGOCALCIFEROL) 1.25 MG (14230 UT) capsule     Sig: Take 1 capsule by mouth once a week     Dispense:  12 capsule     Refill:  3    cyclobenzaprine (FLEXERIL) 10 MG tablet     Sig: Take 1 tablet by mouth 2 times daily as needed for Muscle spasms     Dispense:  180 tablet     Refill:  1    Omega-3 Fatty Acids (FISH OIL) 1000 MG CAPS     Sig: Take 2 capsules by mouth 2 times daily     Dispense:  180 capsule     Refill:  2    SUMAtriptan (IMITREX) 50 MG tablet     Sig: Take 1 tablet by mouth 2 times daily as needed for Migraine (Max 2/day -- 3 month supply)     Dispense:  180 tablet     Refill:  1    omeprazole (PRILOSEC) 10 MG delayed release capsule     Sig: Take 1 capsule by mouth daily     Dispense:  90 capsule     Refill:  1         ORDERS:  Orders Placed This Encounter   Procedures    External Referral To Physical Therapy    POCT Urinalysis no Micro       Follow-up:  No follow-ups on file. PATIENT INSTRUCTIONS:  Patient Instructions   1. Urinary urgency we will treat we will trial Kegel exercises  2. Chronic low back pain we will refer to physical therapy  4. Kyphosis hopefully this will help after some physical therapy and strengthening  4. Hypertension is good today no changes are necessary continue same  5. Hypothyroidism level is good today no changes are necessary  6. Chronic pain syndrome she is followed by Dr. michelle  7. Vitamin D deficiency level is good today no changes are necessary    Electronically signed by TAMIA Malik on 2020 at 2:34 PM    EMRDragon/transcription disclaimer:  Much of this encounter note is electronic transcription/translation of spoken language to printed texts. The electronic translation of spoken language may be erroneous, or at times,nonsensical words or phrases may be inadvertently transcribed. Although I have reviewed the note for such errors, some may still exist.  Medicare Annual Wellness Visit  Name: Gurinder Bridges Date: 2020   MRN: [id number] Sex: Female   Age: 70 y.o. Ethnicity: Non-/Non    : 1940 Race: Vivi Campbell is here for Medicare AWV (Patient is here for Medicare Wellness Visit ) and Hypertension (Patient is here for routine follow up visit 20)    Screenings for behavioral, psychosocial and functional/safety risks, and cognitive dysfunction are all negative except as indicated below. These results, as well as other patient data from the [address] form, are documented in Flowsheets linked to this Encounter.     Allergies   Allergen Reactions    Ambien [Zolpidem Tartrate]     Codeine     Lyrica [Pregabalin]     Morphine     Requip [Ropinirole Hcl]     Tizanidine      Terrible nightmares         Prior to Visit Medications    Medication Sig Taking?  Authorizing Provider   carvedilol (COREG) 12.5 MG tablet TAKE 1 TABLET TWICE A DAY Yes Katie Drop, APRN   levothyroxine (SYNTHROID) 50 MCG tablet TAKE 1 TABLET DAILY Yes [place] Drop, APRN   atorvastatin (LIPITOR) 40 MG tablet TAKE 1 TABLET DAILY AS DIRECTED (CHANGE PRIMARY CARE PHYSICIAN TO NILDA LINTON FOR ALL FUTURE REFILLS) Yes [place] Drop, APRN   ergocalciferol (ERGOCALCIFEROL) 1.25 MG (55441 UT) capsule Take 1 capsule by mouth once a week Yes [place] Drop, APRN   cyclobenzaprine (FLEXERIL) 10 MG tablet Take 1 tablet by mouth 2 times daily as needed for Muscle spasms Yes Katie Drop, APRN   Omega-3 Fatty Acids (FISH OIL) 1000 MG CAPS Take 2 capsules by mouth 2 times daily Yes Katie Drop, APRN   SUMAtriptan (IMITREX) 50 MG tablet Take 1 tablet by mouth 2 times daily as needed for Migraine (Max 2/day -- 3 month supply) Yes Katie Drop, APRN   omeprazole (PRILOSEC) 10 MG delayed release capsule Take 1 capsule by mouth daily Yes Katie Drop, APRN   oxyCODONE (OXY-IR) 15 MG immediate release tablet  Yes Historical Provider, MD   bumetanide (BUMEX) 1 MG tablet Take 1 tablet by mouth daily Yes Katie Drop, APRN   carbidopa-levodopa (SINEMET)  MG per tablet Take 1 tablet by mouth 4 times daily For restless legs and cramping Yes Vida Torres MD   Prenatal Multivit-Min-Fe-FA (PRENATAL 1 + IRON PO) Take by mouth Yes Historical Provider, MD   loratadine (CLARITIN) 10 MG capsule Take 10 mg by mouth daily Yes Historical Provider, MD   FIBER FORMULA PO Take by mouth Yes Historical Provider, MD       Past Medical History:   Diagnosis Date    [facility]14[facility]2017    Anemia     Arthritis     CAD (coronary artery disease)     \"Stiff Valve\"    Chronic

## 2020-01-22 NOTE — PATIENT INSTRUCTIONS
1. Urinary urgency we will treat we will trial Kegel exercises  2. Chronic low back pain we will refer to physical therapy  4. Kyphosis hopefully this will help after some physical therapy and strengthening  4. Hypertension is good today no changes are necessary continue same  5. Hypothyroidism level is good today no changes are necessary  6. Chronic pain syndrome she is followed by Dr. michelle  7. Vitamin D deficiency level is good today no changes are necessary  Personalized Preventive Plan for Marti Crimes - 1/22/2020  Medicare offers a range of preventive health benefits. Some of the tests and screenings are paid in full while other may be subject to a deductible, co-insurance, and/or copay. Some of these benefits include a comprehensive review of your medical history including lifestyle, illnesses that may run in your family, and various assessments and screenings as appropriate. After reviewing your medical record and screening and assessments performed today your provider may have ordered immunizations, labs, imaging, and/or referrals for you. A list of these orders (if applicable) as well as your Preventive Care list are included within your After Visit Summary for your review. Other Preventive Recommendations:    · A preventive eye exam performed by an eye specialist is recommended every 1-2 years to screen for glaucoma; cataracts, macular degeneration, and other eye disorders. · A preventive dental visit is recommended every 6 months. · Try to get at least 150 minutes of exercise per week or 10,000 steps per day on a pedometer . · Order or download the FREE \"Exercise & Physical Activity: Your Everyday Guide\" from [employer]. Call 6-945.972.2827 or search [employer] online. · You need 0103-1973 mg of calcium and 6297-8304 IU of vitamin D per day.  It is possible to meet your calcium requirement with diet alone, but a vitamin D supplement is

## 2020-01-24 LAB — VITAMIN D 1,25-DIHYDROXY: 36.5 PG/ML (ref 19.9–79.3)

## 2020-02-11 ENCOUNTER — OFFICE VISIT (OUTPATIENT)
Dept: OBSTETRICS AND GYNECOLOGY | Facility: CLINIC | Age: 80
End: 2020-02-11

## 2020-02-11 VITALS
HEIGHT: 62 IN | WEIGHT: 198 LBS | SYSTOLIC BLOOD PRESSURE: 144 MMHG | BODY MASS INDEX: 36.44 KG/M2 | DIASTOLIC BLOOD PRESSURE: 80 MMHG

## 2020-02-11 DIAGNOSIS — F41.9 ANXIETY: ICD-10-CM

## 2020-02-11 DIAGNOSIS — F51.01 PRIMARY INSOMNIA: ICD-10-CM

## 2020-02-11 PROCEDURE — 99213 OFFICE O/P EST LOW 20 MIN: CPT | Performed by: OBSTETRICS & GYNECOLOGY

## 2020-02-11 RX ORDER — VENLAFAXINE 25 MG/1
25 TABLET ORAL 2 TIMES DAILY
Qty: 60 TABLET | Refills: 1 | Status: SHIPPED | OUTPATIENT
Start: 2020-02-11 | End: 2020-04-14 | Stop reason: SDUPTHER

## 2020-02-11 RX ORDER — OMEPRAZOLE 10 MG/1
CAPSULE, DELAYED RELEASE ORAL
COMMUNITY
Start: 2020-01-22 | End: 2020-05-12

## 2020-02-11 RX ORDER — ALPRAZOLAM 0.5 MG/1
1 TABLET ORAL 2 TIMES DAILY
Qty: 60 TABLET | Refills: 2 | Status: SHIPPED | OUTPATIENT
Start: 2020-02-11 | End: 2020-04-14 | Stop reason: SDUPTHER

## 2020-02-11 NOTE — PROGRESS NOTES
"Subjective   Chief Complaint   Patient presents with   • Med Refill     pt here today wanting to discuss xanax. pt says she feels like it is no working. pt voices no other concerns.      Jennifer Gomes is a 79 y.o. year old .  No LMP recorded (lmp unknown). Patient has had a hysterectomy.  She presents to be seen for follow-up of xanax.  Patient still feels like xanax is still helping her sleep at night, but feels like it is not as effective as it used to be.  She would like to have a stronger dose.  We had previously discussed that I was hesitant to do this since she takes oxycodone, but she promises that she never takes the oxycodone at night.  Patient more optimistic than I have seen her in a while; her daughter has been away at a rehab facility since April and will be coming home soon.    The following portions of the patient's history were reviewed and updated as appropriate:current medications and allergies    Social History    Tobacco Use      Smoking status: Never Smoker      Smokeless tobacco: Never Used    Review of Systems   Constitutional: Negative for activity change and unexpected weight change.   Respiratory: Negative for shortness of breath.    Cardiovascular: Negative for chest pain.   Gastrointestinal: Positive for constipation (chronic, takes stool softeners tid). Negative for abdominal pain.   Musculoskeletal:        Walking with two canes   Psychiatric/Behavioral: Positive for sleep disturbance. Negative for dysphoric mood. The patient is not nervous/anxious.         Patient feels like her affect is too flat and wants to try to get off her Effexor.  Patient feels like her mood is stable right now and hopes that she does not need any medication for anxiety/depression         Objective   /80   Ht 157.5 cm (62\")   Wt 89.8 kg (198 lb)   LMP  (LMP Unknown)   BMI 36.21 kg/m²     Physical Exam   Constitutional: She is oriented to person, place, and time. She appears well-developed and " well-nourished. No distress.   HENT:   Head: Normocephalic and atraumatic.   Eyes: EOM are normal.   Neck: Normal range of motion. No thyromegaly present.   Cardiovascular: Normal rate and regular rhythm.   No murmur heard.  Pulmonary/Chest: Effort normal and breath sounds normal. She has no wheezes.   Abdominal: Soft. She exhibits no distension. There is no tenderness.   Musculoskeletal: Normal range of motion.   Neurological: She is alert and oriented to person, place, and time.   Skin: Skin is warm and dry.   Psychiatric: She has a normal mood and affect. Her behavior is normal. Judgment normal.   Nursing note and vitals reviewed.      Lab Review   No data reviewed    Imaging   No data reviewed     Assessment & Plan  Jennifer was seen today for med refill.    Diagnoses and all orders for this visit:    Anxiety: Patient feels that she will be able to wean off medication; mood much improved over situation when she started medication.  Will decrease dose bid for next month and then have patient take lowest dose once daily for another month before stopping completely .  -     venlafaxine (EFFEXOR) 25 MG tablet; Take 1 tablet by mouth 2 (Two) Times a Day. New dose twice daily for first month, and then once daily for next month  -     ALPRAZolam (XANAX) 0.5 MG tablet; Take 2 tablets by mouth 2 (Two) Times a Day.    Primary insomnia: Patient will take 0.5 to 1.0 mg, depending on what time she is taking PM meds.  She will not combine with any other sleep aid or narcotic.  RTO in 3 months and will do annual exam at that time.  -     ALPRAZolam (XANAX) 0.5 MG tablet; Take 2 tablets by mouth 2 (Two) Times a Day.        This note was electronically signed.    Shasta Madrigal MD  February 11, 2020  2:00 PM    Total time spent today with Jennifer  was 20 minutes (level 3).  Greater than 50% of the time was spent coordinating care, answering her questions and counseling regarding pathophysiology of her presenting problem along  with plans for any diagnositc work-up and treatment.

## 2020-03-06 NOTE — TELEPHONE ENCOUNTER
[facility] called requesting a refill of the below medication which has been pended for you:     Requested Prescriptions     Pending Prescriptions Disp Refills    bumetanide (BUMEX) 1 MG tablet [Pharmacy Med Name: BUMETANIDE TABS 1MG] 90 tablet 3     Sig: TAKE 1 TABLET DAILY       Last Appointment Date: 1/22/2020  Next Appointment Date: 3/23/2020    Allergies   Allergen Reactions    Ambien [Zolpidem Tartrate]     Codeine     Lyrica [Pregabalin]     Morphine     Requip [Ropinirole Hcl]     Tizanidine      Terrible nightmares

## 2020-03-09 RX ORDER — BUMETANIDE 1 MG/1
TABLET ORAL
Qty: 90 TABLET | Refills: 3 | Status: SHIPPED | OUTPATIENT
Start: 2020-03-09 | End: 2020-06-29 | Stop reason: SDUPTHER

## 2020-03-19 RX ORDER — LANSOPRAZOLE 30 MG/1
CAPSULE, DELAYED RELEASE ORAL
Qty: 90 CAPSULE | Refills: 3 | Status: SHIPPED | OUTPATIENT
Start: 2020-03-19 | End: 2020-03-19 | Stop reason: SDUPTHER

## 2020-03-19 RX ORDER — LANSOPRAZOLE 30 MG/1
CAPSULE, DELAYED RELEASE ORAL
Qty: 30 CAPSULE | Refills: 0 | Status: SHIPPED | OUTPATIENT
Start: 2020-03-19 | End: 2020-06-23 | Stop reason: SDUPTHER

## 2020-03-23 ENCOUNTER — OFFICE VISIT (OUTPATIENT)
Dept: INTERNAL MEDICINE | Age: 80
End: 2020-03-23
Payer: MEDICARE

## 2020-03-23 VITALS — DIASTOLIC BLOOD PRESSURE: 73 MMHG | HEART RATE: 92 BPM | SYSTOLIC BLOOD PRESSURE: 152 MMHG | TEMPERATURE: 99 F

## 2020-03-23 PROCEDURE — 99442 PR PHYS/QHP TELEPHONE EVALUATION 11-20 MIN: CPT | Performed by: NURSE PRACTITIONER

## 2020-03-23 RX ORDER — VENLAFAXINE 25 MG/1
TABLET ORAL
COMMUNITY
Start: 2020-03-11

## 2020-03-23 RX ORDER — CEFDINIR 300 MG/1
300 CAPSULE ORAL 2 TIMES DAILY
Qty: 14 CAPSULE | Refills: 0 | Status: SHIPPED | OUTPATIENT
Start: 2020-03-23 | End: 2020-03-30

## 2020-03-23 RX ORDER — ALPRAZOLAM 0.5 MG/1
TABLET ORAL
COMMUNITY
Start: 2020-03-21

## 2020-03-23 RX ORDER — BUTALBITAL, ACETAMINOPHEN AND CAFFEINE 50; 325; 40 MG/1; MG/1; MG/1
TABLET ORAL
COMMUNITY
Start: 2020-03-12

## 2020-03-23 ASSESSMENT — ENCOUNTER SYMPTOMS
SORE THROAT: 1
COLOR CHANGE: 0
COUGH: 1
ABDOMINAL DISTENTION: 0
BLOOD IN STOOL: 0
TROUBLE SWALLOWING: 0
SINUS PAIN: 1
EYE ITCHING: 0
SHORTNESS OF BREATH: 0
STRIDOR: 0
VOMITING: 0
EYE DISCHARGE: 0
NAUSEA: 0
ABDOMINAL PAIN: 0
DIARRHEA: 0
CONSTIPATION: 0
SINUS PRESSURE: 1
WHEEZING: 0
CHOKING: 0

## 2020-03-23 NOTE — PROGRESS NOTES
DIASTOLIC 73 [phone]   Site - - - Left Upper Arm - -   Position - - - Sitting - -   Pulse 92 87 88 100 84 -   Temp 99 - - 97.9 - -   Resp - - 18 - 18 -   SpO2 - - 98 96 95 -   Weight - 200 lb 194 lb 186 lb 197 lb -   Height - 5' 2\" 5' 2\" 5' 2\" 5' 6\" -   BMI (wt*703/ht~2) - 36.58 kg/m2 35.48 kg/m2 34.02 kg/m2 31.79 kg/m2 -   Pain Level - - - - - -   Some recent data might be hidden       Family History   Problem Relation Age of Onset    Cancer Mother        Social History     Tobacco Use    Smoking status: Never Smoker    Smokeless tobacco: Never Used   Substance Use Topics    Alcohol use: Yes     Comment: occassionally      Current Outpatient Medications   Medication Sig Dispense Refill    cefdinir (OMNICEF) 300 MG capsule Take 1 capsule by mouth 2 times daily for 7 days 14 capsule 0    ALPRAZolam (XANAX) 0.5 MG tablet       venlafaxine (EFFEXOR) 25 MG tablet       butalbital-acetaminophen-caffeine (FIORICET, ESGIC) -40 MG per tablet       lansoprazole (PREVACID) 30 MG delayed release capsule TAKE 1 CAPSULE DAILY EVERY MORNING 30 capsule 0    bumetanide (BUMEX) 1 MG tablet TAKE 1 TABLET DAILY 90 tablet 3    carvedilol (COREG) 12.5 MG tablet TAKE 1 TABLET TWICE A  tablet 4    levothyroxine (SYNTHROID) 50 MCG tablet TAKE 1 TABLET DAILY 90 tablet 3    atorvastatin (LIPITOR) 40 MG tablet TAKE 1 TABLET DAILY AS DIRECTED (CHANGE PRIMARY CARE PHYSICIAN TO NILDA LINTON FOR ALL FUTURE REFILLS) 90 tablet 2    ergocalciferol (ERGOCALCIFEROL) 1.25 MG (01330 UT) capsule Take 1 capsule by mouth once a week 12 capsule 3    cyclobenzaprine (FLEXERIL) 10 MG tablet Take 1 tablet by mouth 2 times daily as needed for Muscle spasms 180 tablet 1    Omega-3 Fatty Acids (FISH OIL) 1000 MG CAPS Take 2 capsules by mouth 2 times daily 180 capsule 2    SUMAtriptan (IMITREX) 50 MG tablet Take 1 tablet by mouth 2 times daily as needed for Migraine (Max 2/day -- 3 month supply) 180 tablet 1    omeprazole (PRILOSEC) 10 MG delayed release capsule Take 1 capsule by mouth daily 90 capsule 1    oxyCODONE (OXY-IR) 15 MG immediate release tablet       carbidopa-levodopa (SINEMET)  MG per tablet Take 1 tablet by mouth 4 times daily For restless legs and cramping 360 tablet 0    Prenatal Multivit-Min-Fe-FA (PRENATAL 1 + IRON PO) Take by mouth      loratadine (CLARITIN) 10 MG capsule Take 10 mg by mouth daily      FIBER FORMULA PO Take by mouth       No current facility-administered medications for this visit.       Allergies   Allergen Reactions    Ambien [Zolpidem Tartrate]     Codeine     Lyrica [Pregabalin]     Morphine     Requip [Ropinirole Hcl]     Tizanidine      Terrible nightmares         Health Maintenance   Topic Date Due    DTaP/Tdap/Td vaccine (1 - Tdap) 09[birth year]19/1961    DEXA (modify frequency per FRAX score)  09/19/2007    Breast cancer screen  04/17/2019    Annual Wellness Visit (AWV)  05/29/2019    Shingles Vaccine (2 of 3) 01/22/2021 (Originally 1/15/2014)    Lipid screen  01/21/2021    TSH testing  01/21/2021    Potassium monitoring  01/21/2021    Creatinine monitoring  01/21/2021    Flu vaccine  Completed    Pneumococcal 65+ years Vaccine  Completed    Hepatitis A vaccine  Aged Out    Hepatitis B vaccine  Aged Out    Hib vaccine  Aged Out    Meningococcal (ACWY) vaccine  Aged Out       No results found for: LABA1C  No results found for: PSA, PSADIA  TSH   Date Value Ref Range Status   01/21/2020 2.600 0.270 - 4.200 uIU/mL Final   ]  Lab Results   Component Value Date     01/21/2020    K 4.2 01/21/2020     01/21/2020    CO2 23 01/21/2020    BUN 25 (H) 01/21/2020    CREATININE 0.8 01/21/2020    GLUCOSE 105 01/21/2020    CALCIUM 9.3 01/21/2020    PROT 6.9 01/21/2020    LABALBU 3.9 01/21/2020    BILITOT 0.3 01/21/2020    ALKPHOS 78 01/21/2020    AST 15 01/21/2020    ALT <5 (A) 01/21/2020    LABGLOM >60 01/21/2020     Lab Results   Component Value Date    CHOL 237 (H) 01/21/2020    CHOL 224 (H) 09/23/2019    CHOL 152 (L) 03/18/2019     Lab Results   Component Value Date    TRIG 102 01/21/2020    TRIG 115 09/23/2019    TRIG 242 (H) 03/18/2019     Lab Results   Component Value Date    HDL 64 (L) 01/21/2020    HDL 55 (L) 09/23/2019    HDL 44 (L) 03/18/2019     Lab Results   Component Value Date    LDLCALC 153 01/21/2020    LDLCALC 146 09/23/2019    LDLCALC 60 03/18/2019     Lab Results   Component Value Date     01/21/2020    K 4.2 01/21/2020     01/21/2020    CO2 23 01/21/2020    BUN 25 01/21/2020    CREATININE 0.8 01/21/2020    GLUCOSE 105 01/21/2020    CALCIUM 9.3 01/21/2020      Lab Results   Component Value Date    WBC 4.9 01/21/2020    HGB 12.1 01/21/2020    HCT 38.0 01/21/2020    MCV 95.2 01/21/2020     01/21/2020    LYMPHOPCT 25.8 01/21/2020    RBC 3.99 (L) 01/21/2020    MCH 30.3 01/21/2020    MCHC 31.8 (L) 01/21/2020    RDW 12.7 01/21/2020     Lab Results   Component Value Date    VITD25 51.0 09/23/2019       Subjective:      Review of Systems   Constitutional: Negative for fatigue, fever and unexpected weight change. HENT: Positive for sinus pressure, sinus pain and sore throat. Negative for ear discharge, ear pain, mouth sores and trouble swallowing. Eyes: Negative for discharge, itching and visual disturbance. Respiratory: Positive for cough. Negative for choking, shortness of breath, wheezing and stridor. Cardiovascular: Negative for chest pain, palpitations and leg swelling. Gastrointestinal: Negative for abdominal distention, abdominal pain, blood in stool, constipation, diarrhea, nausea and vomiting. Endocrine: Negative for cold intolerance, polydipsia and polyuria. Genitourinary: Negative for difficulty urinating, dysuria, frequency and urgency. Musculoskeletal: Negative for arthralgias and gait problem. Skin: Negative for color change and rash. Allergic/Immunologic: Negative for food allergies and immunocompromised state. Neurological: Negative for dizziness, tremors, syncope, speech difficulty, weakness and headaches. Hematological: Negative for adenopathy. Does not bruise/bleed easily. Psychiatric/Behavioral: Negative for confusion and hallucinations. Objective:     Physical Exam   DGEVISITPE      GENERAL:   Afebrile alert no acute distress  Her voice sounds nasal as with sinus congestion   Respiratory no use of accessory muscles no acute distress no audible wheezing  Mental status alert oriented adequate thought processes        BP (!) 152/73   Pulse 92   Temp 99 °F (37.2 °C)     Assessment:       Diagnosis Orders   1. Acute recurrent sinusitis, unspecified location     2. Essential hypertension         Plan:        Patient given educational materials - see patient instructions. Discussed use, benefit, and side effects of prescribed medications. Allpatient questions answered. Pt voiced understanding. Reviewed health maintenance. Instructed to continue current medications, diet and exercise. Patient agreed with treatment plan. Follow up as directed. MEDICATIONS:  Orders Placed This Encounter   Medications    cefdinir (OMNICEF) 300 MG capsule     Sig: Take 1 capsule by mouth 2 times daily for 7 days     Dispense:  14 capsule     Refill:  0         ORDERS:  No orders of the defined types were placed in this encounter. Follow-up:  Return for keep fu appt. PATIENT INSTRUCTIONS:  Patient Instructions   1. Acute sinusitis     Cefdinir 300 twice  a day for 7 days  get 2 doses in today    Saline nasal spray 4 times a day both nares for 7 days  Steroid spray, rhinocort, nasocort, nasonex, flonase twice daily about 5 minutes after the saline   mucinex 1200 mg twice daily for 7 days with plenty of water  Delsym cough syrup up to 3 times daily as needed for cough   Ricola cough drops, honey lemon in pink bag;  has echanesia to help build your immunity     2.  Htn;  dont take sudafed   Continue other meds Electronically signed by TAMIA Thomas on 3/23/2020 at 3:46 PM    EMRDragon/transcription disclaimer:  Much of this encounter note is electronic transcription/translation of spoken language to printed texts. The electronic translation of spoken language may be erroneous, or at times,nonsensical words or phrases may be inadvertently transcribed.   Although I have reviewed the note for such errors, some may still exist.

## 2020-04-13 ENCOUNTER — TELEPHONE (OUTPATIENT)
Dept: OBSTETRICS AND GYNECOLOGY | Facility: CLINIC | Age: 80
End: 2020-04-13

## 2020-04-13 NOTE — TELEPHONE ENCOUNTER
Pt called requesting refill on her Xanax 0.5 mg take two BID. Also on Effexor 25 mg one daily. To [facility].  Schuyler myles.

## 2020-04-14 DIAGNOSIS — F51.01 PRIMARY INSOMNIA: ICD-10-CM

## 2020-04-14 DIAGNOSIS — F41.9 ANXIETY: ICD-10-CM

## 2020-04-14 RX ORDER — ALPRAZOLAM 0.5 MG/1
1 TABLET ORAL 2 TIMES DAILY
Qty: 60 TABLET | Refills: 2 | Status: SHIPPED | OUTPATIENT
Start: 2020-04-14 | End: 2020-05-12 | Stop reason: SDUPTHER

## 2020-04-14 RX ORDER — VENLAFAXINE 25 MG/1
25 TABLET ORAL DAILY
Qty: 30 TABLET | Refills: 2 | Status: SHIPPED | OUTPATIENT
Start: 2020-04-14 | End: 2020-05-12 | Stop reason: SDUPTHER

## 2020-04-15 NOTE — TELEPHONE ENCOUNTER
Called [facility] - pt has not yet picked up medication.  They gave me another number to call: pt's mobile. Was able to contact pt and informed her medication was ready for .  Number is added to demographics

## 2020-05-12 ENCOUNTER — OFFICE VISIT (OUTPATIENT)
Dept: OBSTETRICS AND GYNECOLOGY | Facility: CLINIC | Age: 80
End: 2020-05-12

## 2020-05-12 DIAGNOSIS — F51.01 PRIMARY INSOMNIA: ICD-10-CM

## 2020-05-12 DIAGNOSIS — F41.9 ANXIETY: ICD-10-CM

## 2020-05-12 PROCEDURE — 99421 OL DIG E/M SVC 5-10 MIN: CPT | Performed by: OBSTETRICS & GYNECOLOGY

## 2020-05-12 RX ORDER — VENLAFAXINE 25 MG/1
25 TABLET ORAL DAILY
Qty: 30 TABLET | Refills: 2 | Status: SHIPPED | OUTPATIENT
Start: 2020-05-12 | End: 2020-07-14 | Stop reason: SDUPTHER

## 2020-05-12 RX ORDER — ALPRAZOLAM 0.5 MG/1
1 TABLET ORAL 2 TIMES DAILY
Qty: 60 TABLET | Refills: 2 | Status: SHIPPED | OUTPATIENT
Start: 2020-05-12 | End: 2020-07-14 | Stop reason: SDUPTHER

## 2020-05-12 RX ORDER — LANSOPRAZOLE 30 MG/1
30 CAPSULE, DELAYED RELEASE ORAL EVERY MORNING
COMMUNITY
Start: 2020-03-19

## 2020-05-12 RX ORDER — ATORVASTATIN CALCIUM 40 MG/1
40 TABLET, FILM COATED ORAL NIGHTLY
COMMUNITY
Start: 2020-04-22 | End: 2023-02-28

## 2020-05-12 NOTE — PROGRESS NOTES
Subjective   No chief complaint on file.    Jennifer Gomes is a 79 y.o. year old .  No LMP recorded (lmp unknown). Patient has had a hysterectomy.  Telephone visit today to follow-up on  Anxiety/xanax.  Patient's xanax dose was increased at her last visit.    Patient still does not like the Effexor because she feels like it makes her too flat if she takes it twice daily, but she is managing with taking it once daily.  We had previously discussed that I was hesitant to do this since she takes oxycodone, but she promises that she never takes the oxycodone at night.    The following portions of the patient's history were reviewed and updated as appropriate:current medications and allergies    Social History    Tobacco Use      Smoking status: Never Smoker      Smokeless tobacco: Never Used    Review of Systems   Constitutional: Negative for activity change and unexpected weight change.   Respiratory: Negative for shortness of breath.    Cardiovascular: Negative for chest pain.   Gastrointestinal: Positive for constipation (chronic, takes stool softeners tid). Negative for abdominal pain.   Musculoskeletal:        Walking with two canes   Psychiatric/Behavioral: Positive for sleep disturbance. Negative for dysphoric mood. The patient is not nervous/anxious.         Patient feels like her affect is too flat and wants to try to get off her Effexor.  Patient feels like her mood is stable right now and hopes that she does not need any medication for anxiety/depression         Objective   LMP  (LMP Unknown)     Physical Exam: N/A, telephone viist    Lab Review   No data reviewed    Imaging   No data reviewed     Assessment & Plan  Jennifer was seen today for med refill.    Diagnoses and all orders for this visit:  Jennifer was seen today for med refill.    Diagnoses and all orders for this visit:    Anxiety: Patient only taking Effexor once daily because she feels like taking it bid makes her mood too flat.  Patient  already does not get out much because she ambulates with two canes, so social isolation has not really affected mood.  Patient's daughter did get out of rehab and she is doing well  -     venlafaxine (EFFEXOR) 25 MG tablet; Take 1 tablet by mouth Daily. New dose twice daily for first month, and then once daily for next month  -     ALPRAZolam (Xanax) 0.5 MG tablet; Take 2 tablets by mouth 2 (Two) Times a Day.    Primary insomnia: Patient able to sleep if she takes one before bed and then another one when she wakes up in middle of the night. Patient does not ever combine xanax with percocet (which she only takes during the day)   -     ALPRAZolam (Xanax) 0.5 MG tablet; Take 2 tablets by mouth 2 (Two) Times a Day.      This note was electronically signed.    Shasta Madrigal MD  May 12, 2020  14:05    This visit has been rescheduled as a phone visit to comply with patient safety concerns in accordance with CDC recommendations. Total time of discussion was 10 minutes.

## 2020-05-26 RX ORDER — CYCLOBENZAPRINE HCL 10 MG
10 TABLET ORAL 2 TIMES DAILY PRN
Qty: 180 TABLET | Refills: 1 | Status: SHIPPED | OUTPATIENT
Start: 2020-05-26 | End: 2020-06-29 | Stop reason: SDUPTHER

## 2020-06-23 RX ORDER — LANSOPRAZOLE 30 MG/1
CAPSULE, DELAYED RELEASE ORAL
Qty: 30 CAPSULE | Refills: 2 | Status: SHIPPED | OUTPATIENT
Start: 2020-06-23 | End: 2020-06-24 | Stop reason: SDUPTHER

## 2020-06-24 RX ORDER — LANSOPRAZOLE 30 MG/1
CAPSULE, DELAYED RELEASE ORAL
Qty: 30 CAPSULE | Refills: 2 | Status: SHIPPED | OUTPATIENT
Start: 2020-06-24 | End: 2020-06-29 | Stop reason: SDUPTHER

## 2020-06-26 DIAGNOSIS — E55.9 VITAMIN D DEFICIENCY: ICD-10-CM

## 2020-06-26 DIAGNOSIS — E03.9 HYPOTHYROIDISM (ACQUIRED): ICD-10-CM

## 2020-06-26 DIAGNOSIS — I10 ESSENTIAL HYPERTENSION: ICD-10-CM

## 2020-06-26 LAB
ALBUMIN SERPL-MCNC: 4 G/DL (ref 3.5–5.2)
ALP BLD-CCNC: 83 U/L (ref 35–104)
ALT SERPL-CCNC: <5 U/L (ref 5–33)
ANION GAP SERPL CALCULATED.3IONS-SCNC: 12 MMOL/L (ref 7–19)
AST SERPL-CCNC: 17 U/L (ref 5–32)
BACTERIA: NEGATIVE /HPF
BASOPHILS ABSOLUTE: 0 K/UL (ref 0–0.2)
BASOPHILS RELATIVE PERCENT: 0.3 % (ref 0–1)
BILIRUB SERPL-MCNC: 0.3 MG/DL (ref 0.2–1.2)
BILIRUBIN URINE: NEGATIVE
BLOOD, URINE: NEGATIVE
BUN BLDV-MCNC: 26 MG/DL (ref 8–23)
CALCIUM SERPL-MCNC: 9.9 MG/DL (ref 8.8–10.2)
CHLORIDE BLD-SCNC: 103 MMOL/L (ref 98–111)
CHOLESTEROL, TOTAL: 176 MG/DL (ref 160–199)
CLARITY: CLEAR
CO2: 28 MMOL/L (ref 22–29)
COLOR: ABNORMAL
CREAT SERPL-MCNC: 0.9 MG/DL (ref 0.5–0.9)
EOSINOPHILS ABSOLUTE: 0.5 K/UL (ref 0–0.6)
EOSINOPHILS RELATIVE PERCENT: 7 % (ref 0–5)
EPITHELIAL CELLS, UA: 2 /HPF (ref 0–5)
GFR NON-AFRICAN AMERICAN: >60
GLUCOSE BLD-MCNC: 109 MG/DL (ref 74–109)
GLUCOSE URINE: NEGATIVE MG/DL
HCT VFR BLD CALC: 35.8 % (ref 37–47)
HDLC SERPL-MCNC: 60 MG/DL (ref 65–121)
HEMOGLOBIN: 11.7 G/DL (ref 12–16)
HYALINE CASTS: 14 /HPF (ref 0–8)
IMMATURE GRANULOCYTES #: 0 K/UL
KETONES, URINE: ABNORMAL MG/DL
LDL CHOLESTEROL CALCULATED: 95 MG/DL
LEUKOCYTE ESTERASE, URINE: ABNORMAL
LYMPHOCYTES ABSOLUTE: 1.6 K/UL (ref 1.1–4.5)
LYMPHOCYTES RELATIVE PERCENT: 23.5 % (ref 20–40)
MCH RBC QN AUTO: 31.4 PG (ref 27–31)
MCHC RBC AUTO-ENTMCNC: 32.7 G/DL (ref 33–37)
MCV RBC AUTO: 96 FL (ref 81–99)
MONOCYTES ABSOLUTE: 0.8 K/UL (ref 0–0.9)
MONOCYTES RELATIVE PERCENT: 12.4 % (ref 0–10)
NEUTROPHILS ABSOLUTE: 3.7 K/UL (ref 1.5–7.5)
NEUTROPHILS RELATIVE PERCENT: 56.3 % (ref 50–65)
NITRITE, URINE: NEGATIVE
PDW BLD-RTO: 12.6 % (ref 11.5–14.5)
PH UA: 5.5 (ref 5–8)
PLATELET # BLD: 156 K/UL (ref 130–400)
PMV BLD AUTO: 10.8 FL (ref 9.4–12.3)
POTASSIUM SERPL-SCNC: 5.3 MMOL/L (ref 3.5–5)
PROTEIN UA: NEGATIVE MG/DL
RBC # BLD: 3.73 M/UL (ref 4.2–5.4)
RBC UA: 5 /HPF (ref 0–4)
SODIUM BLD-SCNC: 143 MMOL/L (ref 136–145)
SPECIFIC GRAVITY UA: 1.03 (ref 1–1.03)
TOTAL PROTEIN: 6.7 G/DL (ref 6.6–8.7)
TRIGL SERPL-MCNC: 105 MG/DL (ref 0–149)
TSH SERPL DL<=0.05 MIU/L-ACNC: 4.16 UIU/ML (ref 0.27–4.2)
UROBILINOGEN, URINE: 0.2 E.U./DL
VITAMIN D 25-HYDROXY: 41.5 NG/ML
WBC # BLD: 6.6 K/UL (ref 4.8–10.8)
WBC UA: 16 /HPF (ref 0–5)

## 2020-06-28 LAB — URINE CULTURE, ROUTINE: NORMAL

## 2020-06-29 ENCOUNTER — OFFICE VISIT (OUTPATIENT)
Dept: INTERNAL MEDICINE | Age: 80
End: 2020-06-29
Payer: MEDICARE

## 2020-06-29 VITALS
HEIGHT: 62 IN | DIASTOLIC BLOOD PRESSURE: 74 MMHG | BODY MASS INDEX: 36.8 KG/M2 | OXYGEN SATURATION: 95 % | HEART RATE: 70 BPM | SYSTOLIC BLOOD PRESSURE: 124 MMHG | WEIGHT: 200 LBS

## 2020-06-29 PROCEDURE — 1123F ACP DISCUSS/DSCN MKR DOCD: CPT | Performed by: NURSE PRACTITIONER

## 2020-06-29 PROCEDURE — G8427 DOCREV CUR MEDS BY ELIG CLIN: HCPCS | Performed by: NURSE PRACTITIONER

## 2020-06-29 PROCEDURE — 1036F TOBACCO NON-USER: CPT | Performed by: NURSE PRACTITIONER

## 2020-06-29 PROCEDURE — G0439 PPPS, SUBSEQ VISIT: HCPCS | Performed by: NURSE PRACTITIONER

## 2020-06-29 PROCEDURE — G8417 CALC BMI ABV UP PARAM F/U: HCPCS | Performed by: NURSE PRACTITIONER

## 2020-06-29 PROCEDURE — 4040F PNEUMOC VAC/ADMIN/RCVD: CPT | Performed by: NURSE PRACTITIONER

## 2020-06-29 PROCEDURE — 99214 OFFICE O/P EST MOD 30 MIN: CPT | Performed by: NURSE PRACTITIONER

## 2020-06-29 PROCEDURE — G8400 PT W/DXA NO RESULTS DOC: HCPCS | Performed by: NURSE PRACTITIONER

## 2020-06-29 PROCEDURE — 1090F PRES/ABSN URINE INCON ASSESS: CPT | Performed by: NURSE PRACTITIONER

## 2020-06-29 RX ORDER — LANSOPRAZOLE 30 MG/1
CAPSULE, DELAYED RELEASE ORAL
Qty: 30 CAPSULE | Refills: 2 | Status: SHIPPED | OUTPATIENT
Start: 2020-06-29 | End: 2020-08-26 | Stop reason: SDUPTHER

## 2020-06-29 RX ORDER — BUMETANIDE 1 MG/1
TABLET ORAL
Qty: 90 TABLET | Refills: 3 | Status: SHIPPED | OUTPATIENT
Start: 2020-06-29 | End: 2021-07-13 | Stop reason: SDUPTHER

## 2020-06-29 RX ORDER — CYCLOBENZAPRINE HCL 10 MG
10 TABLET ORAL 2 TIMES DAILY PRN
Qty: 180 TABLET | Refills: 1 | Status: SHIPPED | OUTPATIENT
Start: 2020-06-29 | End: 2021-07-13 | Stop reason: SDUPTHER

## 2020-06-29 RX ORDER — ERGOCALCIFEROL (VITAMIN D2) 1250 MCG
50000 CAPSULE ORAL WEEKLY
Qty: 12 CAPSULE | Refills: 3 | Status: SHIPPED | OUTPATIENT
Start: 2020-06-29 | End: 2021-07-13 | Stop reason: SDUPTHER

## 2020-06-29 RX ORDER — ERGOCALCIFEROL (VITAMIN D2) 1250 MCG
50000 CAPSULE ORAL WEEKLY
Qty: 12 CAPSULE | Refills: 3 | Status: SHIPPED | OUTPATIENT
Start: 2020-06-29 | End: 2020-06-29 | Stop reason: SDUPTHER

## 2020-06-29 RX ORDER — CARVEDILOL 12.5 MG/1
TABLET ORAL
Qty: 180 TABLET | Refills: 4 | Status: SHIPPED | OUTPATIENT
Start: 2020-06-29 | End: 2020-08-26 | Stop reason: SDUPTHER

## 2020-06-29 RX ORDER — SUMATRIPTAN 50 MG/1
50 TABLET, FILM COATED ORAL 2 TIMES DAILY PRN
Qty: 180 TABLET | Refills: 1 | Status: SHIPPED | OUTPATIENT
Start: 2020-06-29 | End: 2020-11-04 | Stop reason: SDUPTHER

## 2020-06-29 RX ORDER — ATORVASTATIN CALCIUM 40 MG/1
TABLET, FILM COATED ORAL
Qty: 90 TABLET | Refills: 2 | Status: SHIPPED | OUTPATIENT
Start: 2020-06-29 | End: 2021-03-29

## 2020-06-29 RX ORDER — DONEPEZIL HYDROCHLORIDE 5 MG/1
5 TABLET, FILM COATED ORAL NIGHTLY
Qty: 90 TABLET | Refills: 1 | Status: SHIPPED | OUTPATIENT
Start: 2020-06-29 | End: 2020-10-05

## 2020-06-29 ASSESSMENT — ENCOUNTER SYMPTOMS
SORE THROAT: 0
NAUSEA: 0
BACK PAIN: 1
EYE ITCHING: 0
TROUBLE SWALLOWING: 0
VOMITING: 0
COLOR CHANGE: 0
EYE DISCHARGE: 0
SHORTNESS OF BREATH: 0
CHOKING: 0
ABDOMINAL PAIN: 0
CONSTIPATION: 0
DIARRHEA: 0
WHEEZING: 0
BLOOD IN STOOL: 0
STRIDOR: 0
ABDOMINAL DISTENTION: 0
COUGH: 0

## 2020-06-29 ASSESSMENT — PATIENT HEALTH QUESTIONNAIRE - PHQ9
SUM OF ALL RESPONSES TO PHQ QUESTIONS 1-9: 0
SUM OF ALL RESPONSES TO PHQ QUESTIONS 1-9: 0

## 2020-06-29 NOTE — PATIENT INSTRUCTIONS
1.  Hyperlipidemia; Stable no changes  2. Chronic Back pain;  Using canes, taking sinemet  3. CAD  Stable no change  4. GERD: continue prevacid   5. Right ear pain ;  Irritation  To right ear canal;  Just use simple oil on qtip  For comfort; It is ok to use the qtip right now as we know you have no wax;    6. Vit d def;  Restart the 50,000 unit  7.   Pap DEXA and mammogram with dr Gladys Barillas;      Personalized Preventive Plan for Bernie Do - 6/29/2020  Medicare offers a range of preventive health benefits. Some of the tests and screenings are paid in full while other may be subject to a deductible, co-insurance, and/or copay. Some of these benefits include a comprehensive review of your medical history including lifestyle, illnesses that may run in your family, and various assessments and screenings as appropriate. After reviewing your medical record and screening and assessments performed today your provider may have ordered immunizations, labs, imaging, and/or referrals for you. A list of these orders (if applicable) as well as your Preventive Care list are included within your After Visit Summary for your review. Other Preventive Recommendations:    · A preventive eye exam performed by an eye specialist is recommended every 1-2 years to screen for glaucoma; cataracts, macular degeneration, and other eye disorders. · A preventive dental visit is recommended every 6 months. · Try to get at least 150 minutes of exercise per week or 10,000 steps per day on a pedometer . · Order or download the FREE \"Exercise & Physical Activity: Your Everyday Guide\" from [employer]. Call 5-526.127.8045 or search [employer] online. · You need 7838-6594 mg of calcium and 3762-3408 IU of vitamin D per day.  It is possible to meet your calcium requirement with diet alone, but a vitamin D supplement is usually necessary to meet this goal.  · When exposed to the sun, use a sunscreen that protects against both UVA and UVB radiation with an SPF of 30 or greater. Reapply every 2 to 3 hours or after sweating, drying off with a towel, or swimming. · Always wear a seat belt when traveling in a car. Always wear a helmet when riding a bicycle or motorcycle.

## 2020-06-29 NOTE — PROGRESS NOTES
outside providers/suppliers regularly involved in providing care):   Patient Care Team:  TAMIA Lord as PCP - General (Nurse Practitioner Acute Care)  TAMIA Lord as PCP - [facility] Empaneled Provider  Jordyn Crawford (Obstetrics & Gynecology)  Poly Akers as Consulting Physician (Radiology)  Stanley Latham MD as Consulting Physician (Neurosurgery)  She has been dismissed; Wt Readings from Last 3 Encounters:   06/29/20 200 lb (90.7 kg)   01/22/20 200 lb (90.7 kg)   09/23/19 194 lb (88 kg)     Vitals:    06/29/20 1445   BP: 124/74   Pulse: 70   SpO2: 95%   Weight: 200 lb (90.7 kg)   Height: 5' 2\" (1.575 m)     Body mass index is 36.58 kg/m². Based upon direct observation of the patient, evaluation of cognition reveals global memory impairment noted. Review of Systems -   General no fever chills no malaise fatigue  Diet no dietary restrictions  Skin no rash eruption pigment changes  HEENT no headache dizziness difficulty swallowing foods or medications   Neck no complaint of masses or pain  Chest  no cough shortness of breath exertional dyspnea  Cardiovascular no chest pain palpitations  Hematology no history of anemia or bruising    no urgency frequency nocturia hematuria  GI no indigestion, constipation or diarrhea    Musculoskeletal no joint pain or swelling  Neuro no numbness tingling clinical problems; Has memory loss mostly short term loss    Mental status no problems with  anxiety depression    General; alert, oriented, no acute distress. Skin no rashes or worrisome lesions. HEENT head is atraumatic. Pupils equal, reactive light and accommodation. Neck is supple without masses. Chest is clear without rales, rhonchi. Cardiovascular S1, S2 without murmur. Blood vessels no cyanosis, clubbing peripheral edema. Abdomen is soft.  Bowel sounds  present   Musculoskeletal adequate tone range of motion and strength   lymph there is no tenderness enlargement   Neuro cranial nerves II through XII are grossly intact with facial reflexes are intact psych   Blood vessels distal cyanosis clubbing or peripheral edema  Psych alert and oriented x3 appropriate mood and affect    Patient's complete Health Risk Assessment and screening values have been reviewed and are found in Flowsheets. The following problems were reviewed today and where indicated follow up appointments were made and/or referrals ordered. Positive Risk Factor Screenings with Interventions:     General Health:  General  In general, how would you say your health is?: Fair  In the past 7 days, have you experienced any of the following? New or Increased Pain, New or Increased Fatigue, Loneliness, Social Isolation, Stress or Anger?: None of These  Do you get the social and emotional support that you need?: Yes  Do you have a Living Will?: (!) No  General Health Risk Interventions:  · No Living Will: patient declined  Family knows her wishes     Health Habits/Nutrition:  Health Habits/Nutrition  Do you exercise for at least 20 minutes 2-3 times per week?: (!) No  Have you lost any weight without trying in the past 3 months?: No  Do you eat fewer than 2 meals per day?: No  Have you seen a dentist within the past year?: Yes  Body mass index is 36.58 kg/m². Health Habits/Nutrition Interventions:  · Inadequate physical activity:  patient is not ready to increase his/her physical activity level at this time    Safety:  Safety  Do you have working smoke detectors?: Yes  Have all throw rugs been removed or fastened?: (!) No  Do you have non-slip mats or surfaces in all bathtubs/showers?: Yes  Do all of your stairways have a railing or banister?: Yes  Are your doorways, halls and stairs free of clutter?: Yes  Do you always fasten your seatbelt when you are in a car?: Yes  Safety Interventions:  · Home safety tips provided    ADL:  ADLs  In the past 7 days, did you need help from others to perform any of the following everyday activities? Eating, dressing, grooming, bathing, toileting, or walking/balance?: (!) Walking/Balance  In the past 7 days, did you need help from others to take care of any of the following? Laundry, housekeeping, banking/finances, shopping, telephone use, food preparation, transportation, or taking medications?: (!) Transportation  ADL Interventions:  · Patient declines any further evaluation/treatment for this issue    Personalized Preventive Plan   Current Health Maintenance Status  Immunization History   Administered Date(s) Administered    Influenza, High Dose (Fluzone 65 yrs and older) 11/19/2018    Influenza, Triv, inactivated, subunit, adjuvanted, IM (Fluad 65 yrs and older) 09/23/2019, 10/10/2019    Pneumococcal Conjugate 13-valent ([id number]) 11/01/2014    Pneumococcal Polysaccharide ([id number]) 09/23/2019    Zoster Live (Zostavax) 11/20/2013        Health Maintenance   Topic Date Due    DEXA (modify frequency per FRAX score)  09/19/1997    Breast cancer screen  04/17/2019    Annual Wellness Visit (AWV)  05/29/2019    DTaP/Tdap/Td vaccine (1 - Tdap) 07/14/2020 (Originally 9/19/1959)    Shingles Vaccine (2 of 3) 01/22/2021 (Originally 1/15/2014)    Lipid screen  06/26/2021    TSH testing  06/26/2021    Potassium monitoring  06/26/2021    Creatinine monitoring  06/26/2021    Flu vaccine  Completed    Pneumococcal 65+ years Vaccine  Completed    Hepatitis A vaccine  Aged Out    Hepatitis B vaccine  Aged Out    Hib vaccine  Aged Out    Meningococcal (ACWY) vaccine  Aged Out     Recommendations for Angel Alerts Due: see orders and patient instructions/AVS.  . Recommended screening schedule for the next 5-10 years is provided to the patient in written form: see Patient Latonya Cartagena was seen today for medicare awv and hyperlipidemia.     Diagnoses and all orders for this visit:    Essential hypertension  -     CBC Auto Differential; Future  -     Comprehensive Metabolic Panel; Future  -     Urinalysis Reflex to Culture; Future  -     TSH without Reflex; Future    Hypothyroidism (acquired)  -     TSH without Reflex; Future    Mixed hyperlipidemia    Vitamin D deficiency  -     Vitamin D 25 Hydroxy; Future    Anemia, unspecified type  -     CBC Auto Differential; Future    Routine general medical examination at a health care facility    Other orders  -     SUMAtriptan (IMITREX) 50 MG tablet; Take 1 tablet by mouth 2 times daily as needed for Migraine (Max 2/day -- 3 month supply)  -     Discontinue: ergocalciferol (ERGOCALCIFEROL) 1.25 MG (15406 UT) capsule; Take 1 capsule by mouth once a week  -     cyclobenzaprine (FLEXERIL) 10 MG tablet; Take 1 tablet by mouth 2 times daily as needed for Muscle spasms  -     carvedilol (COREG) 12.5 MG tablet; TAKE 1 TABLET TWICE A DAY  -     bumetanide (BUMEX) 1 MG tablet; TAKE 1 TABLET DAILY  -     atorvastatin (LIPITOR) 40 MG tablet; TAKE 1 TABLET DAILY AS DIRECTED (CHANGE PRIMARY CARE PHYSICIAN TO NILDA LINTON FOR ALL FUTURE REFILLS)  -     lansoprazole (PREVACID) 30 MG delayed release capsule; TAKE 1 CAPSULE DAILY EVERY MORNING  -     donepezil (ARICEPT) 5 MG tablet; Take 1 tablet by mouth nightly  -     ergocalciferol (ERGOCALCIFEROL) 1.25 MG (07285 UT) capsule; Take 1 capsule by mouth once a week            [facility]  [address] [place] [id number]  Dept: [phone]  Dept Fax: [phone]  Loc: [phone]    Olman Herrera is a 70 y.o. female who presents today for her medical conditions/complaints as noted below. Olman Herrera is c/maame Medicare AWV (Patient is here for [employer] Visit.) and Hyperlipidemia (Patient is here for routine follow up visit and review of labs done 6/26/2020)        HPI:     HPI   1. Hyperlipidemia; She is on lipitor 40 and fish  2.  Chronic  Back pain ; she cannot stand up any length of time;  ; neurology said surgery was her only Exam  Constitutional:       General: She is not in acute distress. Appearance: She is well-developed. HENT:      Head: Normocephalic and atraumatic. Ears:      Comments: Debris in her right ear;  Left cerumen impaction removed   Eyes:      General: No scleral icterus. Right eye: No discharge. Left eye: No discharge. Pupils: Pupils are equal, round, and reactive to light. Neck:      Musculoskeletal: Normal range of motion and neck supple. Thyroid: No thyromegaly. Vascular: No JVD. Cardiovascular:      Rate and Rhythm: Normal rate and regular rhythm. Heart sounds: Normal heart sounds. No murmur. Pulmonary:      Effort: Pulmonary effort is normal. No respiratory distress. Breath sounds: Normal breath sounds. No wheezing or rales. Abdominal:      General: Bowel sounds are normal. There is no distension. Palpations: Abdomen is soft. There is no mass. Tenderness: There is no abdominal tenderness. There is no guarding or rebound. Musculoskeletal: Normal range of motion. General: No tenderness. Comments: Kyphosis walks with 2 canes      Skin:     General: Skin is warm and dry. Findings: No erythema or rash. Neurological:      Mental Status: She is alert and oriented to person, place, and time. Cranial Nerves: No cranial nerve deficit. Coordination: Coordination normal.      Deep Tendon Reflexes: Reflexes are normal and symmetric. Reflexes normal.   Psychiatric:         Mood and Affect: Mood is not depressed. Behavior: Behavior normal.         Thought Content: Thought content normal.         Judgment: Judgment normal.     PROCEDURE:  Bilateral debris and cerumen impactions removed with currette;      /74   Pulse 70   Ht 5' 2\" (1.575 m)   Wt 200 lb (90.7 kg)   SpO2 95%   BMI 36.58 kg/m²     Assessment:       Diagnosis Orders   1.  Essential hypertension  CBC Auto Differential    Comprehensive Metabolic Panel Refill:  1    ergocalciferol (ERGOCALCIFEROL) 1.25 MG (24319 UT) capsule     Sig: Take 1 capsule by mouth once a week     Dispense:  12 capsule     Refill:  3         ORDERS:  Orders Placed This Encounter   Procedures    CBC Auto Differential    Comprehensive Metabolic Panel    Vitamin D 25 Hydroxy    Urinalysis Reflex to Culture    TSH without Reflex       Follow-up:  Return for Medicare Annual Wellness Visit in 1 year. PATIENT INSTRUCTIONS:  Patient Instructions   1. Hyperlipidemia; Stable no changes  2. Chronic Back pain;  Using canes, taking sinemet  3. CAD  Stable no change  4. GERD: continue prevacid   5. Right ear pain ;  Irritation  To right ear canal;  Just use simple oil on qtip  For comfort; It is ok to use the qtip right now as we know you have no wax;    6. Vit d def;  Restart the 50,000 unit  7.   Pap DEXA and mammogram with dr Gabriel Neely;      Personalized Preventive Plan for Indira Watts - 6/29/2020  Medicare offers a range of preventive health benefits. Some of the tests and screenings are paid in full while other may be subject to a deductible, co-insurance, and/or copay. Some of these benefits include a comprehensive review of your medical history including lifestyle, illnesses that may run in your family, and various assessments and screenings as appropriate. After reviewing your medical record and screening and assessments performed today your provider may have ordered immunizations, labs, imaging, and/or referrals for you. A list of these orders (if applicable) as well as your Preventive Care list are included within your After Visit Summary for your review. Other Preventive Recommendations:    · A preventive eye exam performed by an eye specialist is recommended every 1-2 years to screen for glaucoma; cataracts, macular degeneration, and other eye disorders. · A preventive dental visit is recommended every 6 months.   · Try to get at least 150 minutes of exercise per week or 10,000 steps per day on a pedometer . · Order or download the FREE \"Exercise & Physical Activity: Your Everyday Guide\" from [employer]. Call 6-323.832.8677 or search [employer] online. · You need 7528-0942 mg of calcium and 8368-1742 IU of vitamin D per day. It is possible to meet your calcium requirement with diet alone, but a vitamin D supplement is usually necessary to meet this goal.  · When exposed to the sun, use a sunscreen that protects against both UVA and UVB radiation with an SPF of 30 or greater. Reapply every 2 to 3 hours or after sweating, drying off with a towel, or swimming. · Always wear a seat belt when traveling in a car. Always wear a helmet when riding a bicycle or motorcycle. Electronically signed by TAMIA Lord on 6/29/2020 at 3:26 PM    [employer]/transcription disclaimer:  Much of this encounter note is electronic transcription/translation of spoken language to printed texts. The electronic translation of spoken language may be erroneous, or at times,nonsensical words or phrases may be inadvertently transcribed.   Although I have reviewed the note for such errors, some may still exist.

## 2020-07-14 DIAGNOSIS — F41.9 ANXIETY: ICD-10-CM

## 2020-07-14 DIAGNOSIS — F51.01 PRIMARY INSOMNIA: ICD-10-CM

## 2020-07-14 RX ORDER — ALPRAZOLAM 0.5 MG/1
1 TABLET ORAL 2 TIMES DAILY
Qty: 60 TABLET | Refills: 2 | Status: SHIPPED | OUTPATIENT
Start: 2020-07-14 | End: 2020-08-11 | Stop reason: SDUPTHER

## 2020-07-14 RX ORDER — VENLAFAXINE 25 MG/1
25 TABLET ORAL DAILY
Qty: 30 TABLET | Refills: 2 | Status: SHIPPED | OUTPATIENT
Start: 2020-07-14 | End: 2020-10-09

## 2020-08-11 ENCOUNTER — OFFICE VISIT (OUTPATIENT)
Dept: OBSTETRICS AND GYNECOLOGY | Facility: CLINIC | Age: 80
End: 2020-08-11

## 2020-08-11 VITALS
SYSTOLIC BLOOD PRESSURE: 142 MMHG | HEIGHT: 62 IN | BODY MASS INDEX: 39.38 KG/M2 | DIASTOLIC BLOOD PRESSURE: 84 MMHG | WEIGHT: 214 LBS

## 2020-08-11 DIAGNOSIS — Z78.0 POSTMENOPAUSAL: ICD-10-CM

## 2020-08-11 DIAGNOSIS — F51.01 PRIMARY INSOMNIA: ICD-10-CM

## 2020-08-11 DIAGNOSIS — F41.9 ANXIETY: Primary | ICD-10-CM

## 2020-08-11 DIAGNOSIS — E66.01 MORBIDLY OBESE (HCC): ICD-10-CM

## 2020-08-11 DIAGNOSIS — F33.1 MODERATE EPISODE OF RECURRENT MAJOR DEPRESSIVE DISORDER (HCC): ICD-10-CM

## 2020-08-11 PROCEDURE — 99214 OFFICE O/P EST MOD 30 MIN: CPT | Performed by: OBSTETRICS & GYNECOLOGY

## 2020-08-11 RX ORDER — SERTRALINE HYDROCHLORIDE 25 MG/1
25 TABLET, FILM COATED ORAL DAILY
Qty: 30 TABLET | Refills: 2 | Status: SHIPPED | OUTPATIENT
Start: 2020-08-11 | End: 2020-10-26 | Stop reason: SDUPTHER

## 2020-08-11 RX ORDER — ALPRAZOLAM 0.5 MG/1
1 TABLET ORAL 2 TIMES DAILY
Qty: 120 TABLET | Refills: 2 | Status: SHIPPED | OUTPATIENT
Start: 2020-08-11 | End: 2020-11-10 | Stop reason: SDUPTHER

## 2020-08-11 RX ORDER — DONEPEZIL HYDROCHLORIDE 5 MG/1
5 TABLET, FILM COATED ORAL
COMMUNITY
Start: 2020-06-29 | End: 2020-11-10

## 2020-08-11 NOTE — PROGRESS NOTES
"Subjective   Chief Complaint   Patient presents with   • Med Refill     pt here today wanting to discuss anxiety depression meds. pt voices feeling very depressed. pt needing refills but wanting to discuss either changing meds or upping dosage. pt voices no other concerns.      Jennifer Gomes is a 79 y.o. year old .  No LMP recorded (lmp unknown). Patient has had a hysterectomy.  She presents to be seen for follow-up of xanax.  Patient still feels like xanax is still helping her sleep at night, but feels like it is not as effective as it used to be.  Patient is \"very depressed\"; her daughter has moved in with her again - her daughter came home from rehab sober, but has already started drinking again.  Patient views her daughter's success/ability to work/productivity as a reflection of her own success as a parent.  Patient cries frequently and says that she cannot sleep.    The following portions of the patient's history were reviewed and updated as appropriate:current medications and allergies    Social History    Tobacco Use      Smoking status: Never Smoker      Smokeless tobacco: Never Used    Review of Systems   Constitutional: Negative for activity change and unexpected weight change.   Respiratory: Negative for shortness of breath.    Cardiovascular: Negative for chest pain.   Gastrointestinal: Positive for constipation (chronic, takes stool softeners tid). Negative for abdominal pain.   Musculoskeletal:        Walking with two canes   Psychiatric/Behavioral: Positive for sleep disturbance. Negative for dysphoric mood. The patient is not nervous/anxious.         Patient feels like her affect is too flat and wants to try to get off her Effexor.  Patient feels like her mood is very down now         Objective   /84   Ht 157.5 cm (62\")   Wt 97.1 kg (214 lb)   LMP  (LMP Unknown)   BMI 39.14 kg/m²     Physical Exam   Constitutional: She is oriented to person, place, and time. She appears " well-developed and well-nourished. No distress.   HENT:   Head: Normocephalic and atraumatic.   Eyes: EOM are normal.   Neck: Normal range of motion. No thyromegaly present.   Cardiovascular: Normal rate and regular rhythm.   No murmur heard.  Pulmonary/Chest: Effort normal and breath sounds normal. She has no wheezes.   Abdominal: Soft. She exhibits no distension. There is no tenderness.   Musculoskeletal: Normal range of motion.   Neurological: She is alert and oriented to person, place, and time.   Skin: Skin is warm and dry.   Psychiatric: She has a normal mood and affect. Her behavior is normal. Judgment normal.   Nursing note and vitals reviewed.      Lab Review   No data reviewed    Imaging   No data reviewed     Assessment & Plan  Jennifer was seen today for med refill.    Diagnoses and all orders for this visit:  Jennifer was seen today for med refill.    Diagnoses and all orders for this visit:    Anxiety: Patient uses to help with sleep at night.  She prefers 0.5 mg tabs since sometimes she can get away with 1/2 mg instead of taking a whole mg.  -     ALPRAZolam (Xanax) 0.5 MG tablet; Take 2 tablets by mouth 2 (Two) Times a Day.    Moderate episode of recurrent major depressive disorder (CMS/HCC): Very depressed.  Patient does not want to increase Effexor dose because it makes her too flat.  Adding small dose of zoloft.  She asked for elavil, but I declined since she is already taking percocet and xanax - I feared she would be too drowsy.    Primary insomnia  -     ALPRAZolam (Xanax) 0.5 MG tablet; Take 2 tablets by mouth 2 (Two) Times a Day.    Postmenopausal    Morbidly obese (CMS/HCC)    Other orders  -     sertraline (ZOLOFT) 25 MG tablet; Take 1 tablet by mouth Daily.    RTO in 3 months    This note was electronically signed.    Shasta Madrigal MD  August 11, 2020  14:25    Total time spent today with Jennifer  was 20 minutes (level 3).  Greater than 50% of the time was spent coordinating care, answering  her questions and counseling regarding pathophysiology of her presenting problem along with plans for any diagnositc work-up and treatment.

## 2020-08-26 RX ORDER — CARVEDILOL 12.5 MG/1
TABLET ORAL
Qty: 180 TABLET | Refills: 4 | Status: SHIPPED | OUTPATIENT
Start: 2020-08-26 | End: 2020-08-26 | Stop reason: SDUPTHER

## 2020-08-26 RX ORDER — LANSOPRAZOLE 30 MG/1
CAPSULE, DELAYED RELEASE ORAL
Qty: 30 CAPSULE | Refills: 0 | Status: SHIPPED | OUTPATIENT
Start: 2020-08-26 | End: 2021-01-04 | Stop reason: SDUPTHER

## 2020-08-26 RX ORDER — LANSOPRAZOLE 30 MG/1
CAPSULE, DELAYED RELEASE ORAL
Qty: 90 CAPSULE | Refills: 1 | Status: SHIPPED | OUTPATIENT
Start: 2020-08-26 | End: 2020-08-26 | Stop reason: SDUPTHER

## 2020-08-26 RX ORDER — CARVEDILOL 12.5 MG/1
TABLET ORAL
Qty: 60 TABLET | Refills: 0 | Status: SHIPPED | OUTPATIENT
Start: 2020-08-26 | End: 2021-07-13 | Stop reason: SDUPTHER

## 2020-10-05 ENCOUNTER — OFFICE VISIT (OUTPATIENT)
Dept: INTERNAL MEDICINE | Age: 80
End: 2020-10-05
Payer: MEDICARE

## 2020-10-05 VITALS
WEIGHT: 225 LBS | BODY MASS INDEX: 41.41 KG/M2 | SYSTOLIC BLOOD PRESSURE: 150 MMHG | HEIGHT: 62 IN | HEART RATE: 81 BPM | DIASTOLIC BLOOD PRESSURE: 75 MMHG

## 2020-10-05 PROBLEM — G47.39 OTHER SLEEP APNEA: Status: ACTIVE | Noted: 2020-10-05

## 2020-10-05 PROCEDURE — 99214 OFFICE O/P EST MOD 30 MIN: CPT | Performed by: NURSE PRACTITIONER

## 2020-10-05 PROCEDURE — G8427 DOCREV CUR MEDS BY ELIG CLIN: HCPCS | Performed by: NURSE PRACTITIONER

## 2020-10-05 PROCEDURE — G8400 PT W/DXA NO RESULTS DOC: HCPCS | Performed by: NURSE PRACTITIONER

## 2020-10-05 PROCEDURE — 1123F ACP DISCUSS/DSCN MKR DOCD: CPT | Performed by: NURSE PRACTITIONER

## 2020-10-05 PROCEDURE — G8417 CALC BMI ABV UP PARAM F/U: HCPCS | Performed by: NURSE PRACTITIONER

## 2020-10-05 PROCEDURE — 1090F PRES/ABSN URINE INCON ASSESS: CPT | Performed by: NURSE PRACTITIONER

## 2020-10-05 PROCEDURE — G8484 FLU IMMUNIZE NO ADMIN: HCPCS | Performed by: NURSE PRACTITIONER

## 2020-10-05 PROCEDURE — G0008 ADMIN INFLUENZA VIRUS VAC: HCPCS | Performed by: NURSE PRACTITIONER

## 2020-10-05 PROCEDURE — 1036F TOBACCO NON-USER: CPT | Performed by: NURSE PRACTITIONER

## 2020-10-05 PROCEDURE — 4040F PNEUMOC VAC/ADMIN/RCVD: CPT | Performed by: NURSE PRACTITIONER

## 2020-10-05 PROCEDURE — 90694 VACC AIIV4 NO PRSRV 0.5ML IM: CPT | Performed by: NURSE PRACTITIONER

## 2020-10-05 RX ORDER — SERTRALINE HYDROCHLORIDE 25 MG/1
TABLET, FILM COATED ORAL
COMMUNITY
Start: 2020-09-11

## 2020-10-05 RX ORDER — DONEPEZIL HYDROCHLORIDE 10 MG/1
10 TABLET, FILM COATED ORAL NIGHTLY
Qty: 90 TABLET | Refills: 1 | Status: SHIPPED | OUTPATIENT
Start: 2020-10-05 | End: 2021-02-09 | Stop reason: SDUPTHER

## 2020-10-05 ASSESSMENT — ENCOUNTER SYMPTOMS
COUGH: 0
VOMITING: 0
NAUSEA: 0
TROUBLE SWALLOWING: 0
DIARRHEA: 0
COLOR CHANGE: 0
EYE DISCHARGE: 0
BACK PAIN: 1
SORE THROAT: 0
ABDOMINAL PAIN: 0
WHEEZING: 0
CONSTIPATION: 0
ABDOMINAL DISTENTION: 0
BLOOD IN STOOL: 0
EYE ITCHING: 0
STRIDOR: 0
SHORTNESS OF BREATH: 0
CHOKING: 0

## 2020-10-05 NOTE — PATIENT INSTRUCTIONS
1.  Hypertension; stable for now;   2. SOB:  She wants to defer for now  3. Health maintenance with DR Asher Berrios;   4. Anxiety; Stable with xanax  5. Chronic pain; Stable with Dr Aurea Castellanos  6. Sleep apnea; It would be beneficial to start using your mask again   7. Memory impairment;   Increase aricept to 10 mg nightly    Take to what you have now to you run out and I have gone ahead and sent you a 10 mg into your mail order pharmacy

## 2020-10-05 NOTE — PROGRESS NOTES
[address] [facility]  [id number] Arianna [place]ina [address]  Dept: [phone]  Dept Fax: [phone]  Loc: [phone]    Jose Martinez is a 68 y.o. female who presents today for her medical conditions/complaints as noted below. Jose Martinez is c/maame Hypertension (Patient is here for routine follow up visit. patient has not had labs done for this visit.)        HPI:     HPI   1. Hypertension; She is on carvedilol 12.5   BID;    2. SOB: she has some increase of SOB;  Lately;    3. Health maintenance; She gets mammogram and bone density with Dr Joyce Vance;   4. Shonda Leopard; She is getting xanax from DR Cadena    5. Chronic pain; She gets fiorcet and oxycodone she gets from Dr Sima Christensen; She goes every 3 months;    6. Hypertension; She has had increased stress while travelling with her daughter this weekend; Her daughter has moved in with her;      7. Chronic illness  She says she and her daughter Gary Crespo get along they are on the outs; Her  brought her; she didn't want to ask her to do anything for her; She was afraid to ask her to bring her; She tells me she cooks and cleans and shops for them; But she wants me to write a letter that she is their total caregiver so her daughter can get food stamps; I have told her I dont feel comfortable doing that as I have never seen her or spoken with her ; And after she tells me of the recent events I certainly do not want to write a letter of support for the daughter;  \  8. Sleep apnea; She no longer wears her mask;    Chief Complaint   Patient presents with    Hypertension     Patient is here for routine follow up visit. patient has not had labs done for this visit.        Past Medical History:   Diagnosis Date    Adenocarcinoma of endometrium, stage 1 ([facility] [address] 11/14/2017    Anemia     Arthritis     CAD (coronary artery disease)     \"Stiff Valve\"    Chronic kidney disease     Depression     Fibrocystic breast disease     5/2/17 biopsy - benign FCBD w/microcalcifications - [facility]     GERD (gastroesophageal reflux disease)     H/O vaginal bleeding     managed Dr Christie Smith    Hypertension     Hypothyroidism (acquired)     Hypothyroidism (acquired)     Mixed hyperlipidemia     Obesity     Osteoarthritis     Pain of both hip joints 1/18/2016    Situational anxiety     Sleep apnea     Vitamin D deficiency       Past Surgical History:   Procedure Laterality Date    BREAST BIOPSY Right     BREAST SURGERY Left     Biopsy, Benign    CYST REMOVAL Left     Shoulder cyst, benign    DILATION AND CURETTAGE OF UTERUS      EYE SURGERY  03/2017    HYSTERECTOMY      Removed uterus and cervix due to cancer, 12/17    LEG SURGERY Left     Tib/Fib ORIF    TONSILLECTOMY         Vitals 10/5/2020 6/29/2020 3/23/2020 1/22/2020 9/23/2019 4/26/5194   SYSTOLIC 356 723 353 181 322 316   DIASTOLIC 75 74 73 71 78 66   Site - - - - - Left Upper Arm   Position - - - - - Sitting   Pulse 81 70 92 87 88 100   Temp - - 99 - - 97.9   Resp - - - - 18 -   SpO2 - 95 - - 98 96   Weight 225 lb 200 lb - 200 lb 194 lb 186 lb   Height 5' 2\" 5' 2\" - 5' 2\" 5' 2\" 5' 2\"   BMI (wt*703/ht~2) 41.15 kg/m2 36.58 kg/m2 - 36.58 kg/m2 35.48 kg/m2 34.02 kg/m2   Pain Level - - - - - -   Some recent data might be hidden       Family History   Problem Relation Age of Onset    Cancer Mother        Social History     Tobacco Use    Smoking status: Never Smoker    Smokeless tobacco: Never Used   Substance Use Topics    Alcohol use: Yes     Comment: occassionally      Current Outpatient Medications   Medication Sig Dispense Refill    sertraline (ZOLOFT) 25 MG tablet       donepezil (ARICEPT) 10 MG tablet Take 1 tablet by mouth nightly 90 tablet 1    lansoprazole (PREVACID) 30 MG delayed release capsule TAKE 1 CAPSULE DAILY EVERY MORNING 30 capsule 0    carvedilol (COREG) 12.5 MG tablet TAKE 1 TABLET TWICE A DAY 60 tablet 0    SUMAtriptan (IMITREX) 50 MG tablet Take 1 tablet by mouth 2 times daily as needed for Migraine (Max 2/day -- 3 month supply) 180 tablet 1    cyclobenzaprine (FLEXERIL) 10 MG tablet Take 1 tablet by mouth 2 times daily as needed for Muscle spasms (Patient taking differently: Take 5 mg by mouth 2 times daily as needed for Muscle spasms ) 180 tablet 1    bumetanide (BUMEX) 1 MG tablet TAKE 1 TABLET DAILY 90 tablet 3    atorvastatin (LIPITOR) 40 MG tablet TAKE 1 TABLET DAILY AS DIRECTED (CHANGE PRIMARY CARE PHYSICIAN TO NILDA LINTON FOR ALL FUTURE REFILLS) 90 tablet 2    ergocalciferol (ERGOCALCIFEROL) 1.25 MG (36972 UT) capsule Take 1 capsule by mouth once a week 12 capsule 3    ALPRAZolam (XANAX) 0.5 MG tablet       venlafaxine (EFFEXOR) 25 MG tablet       butalbital-acetaminophen-caffeine (FIORICET, ESGIC) -40 MG per tablet       levothyroxine (SYNTHROID) 50 MCG tablet TAKE 1 TABLET DAILY 90 tablet 3    Omega-3 Fatty Acids (FISH OIL) 1000 MG CAPS Take 2 capsules by mouth 2 times daily 180 capsule 2    oxyCODONE (OXY-IR) 15 MG immediate release tablet       Prenatal Multivit-Min-Fe-FA (PRENATAL 1 + IRON PO) Take by mouth      loratadine (CLARITIN) 10 MG capsule Take 10 mg by mouth daily      FIBER FORMULA PO Take by mouth       No current facility-administered medications for this visit.       Allergies   Allergen Reactions    Ambien [Zolpidem Tartrate]     Codeine     Lyrica [Pregabalin]     Morphine     Requip [Ropinirole Hcl]     Tizanidine      Terrible nightmares         Health Maintenance   Topic Date Due    DEXA (modify frequency per FRAX score)  09/19/1997    DTaP/Tdap/Td vaccine (1 - Tdap) 10/26/2020 (Originally 9/19/1959)    Shingles Vaccine (2 of 3) 01/22/2021 (Originally 1/15/2014)    Lipid screen  06/26/2021    TSH testing  06/26/2021    Potassium monitoring  06/26/2021    Creatinine monitoring  06/26/2021    Annual Wellness Visit (AWV)  06/30/2021    Breast cancer screen  08/30/2021  Flu vaccine  Completed    Pneumococcal 65+ years Vaccine  Completed    Hepatitis A vaccine  Aged Out    Hepatitis B vaccine  Aged Out    Hib vaccine  Aged Out    Meningococcal (ACWY) vaccine  Aged Out       No results found for: LABA1C  No results found for: PSA, PSADIA  TSH   Date Value Ref Range Status   06/26/2020 4.160 0.270 - 4.200 uIU/mL Final   ]  Lab Results   Component Value Date     06/26/2020    K 5.3 (H) 06/26/2020     06/26/2020    CO2 28 06/26/2020    BUN 26 (H) 06/26/2020    CREATININE 0.9 06/26/2020    GLUCOSE 109 06/26/2020    CALCIUM 9.9 06/26/2020    PROT 6.7 06/26/2020    LABALBU 4.0 06/26/2020    BILITOT 0.3 06/26/2020    ALKPHOS 83 06/26/2020    AST 17 06/26/2020    ALT <5 (A) 06/26/2020    LABGLOM >60 06/26/2020     Lab Results   Component Value Date    CHOL 176 06/26/2020    CHOL 237 (H) 01/21/2020    CHOL 224 (H) 09/23/2019     Lab Results   Component Value Date    TRIG 105 06/26/2020    TRIG 102 01/21/2020    TRIG 115 09/23/2019     Lab Results   Component Value Date    HDL 60 (L) 06/26/2020    HDL 64 (L) 01/21/2020    HDL 55 (L) 09/23/2019     Lab Results   Component Value Date    LDLCALC 95 06/26/2020    LDLCALC 153 01/21/2020    LDLCALC 146 09/23/2019     Lab Results   Component Value Date     06/26/2020    K 5.3 06/26/2020     06/26/2020    CO2 28 06/26/2020    BUN 26 06/26/2020    CREATININE 0.9 06/26/2020    GLUCOSE 109 06/26/2020    CALCIUM 9.9 06/26/2020      Lab Results   Component Value Date    WBC 6.6 06/26/2020    HGB 11.7 (L) 06/26/2020    HCT 35.8 (L) 06/26/2020    MCV 96.0 06/26/2020     06/26/2020    LYMPHOPCT 23.5 06/26/2020    RBC 3.73 (L) 06/26/2020    MCH 31.4 (H) 06/26/2020    MCHC 32.7 (L) 06/26/2020    RDW 12.6 06/26/2020     Lab Results   Component Value Date    VITD25 41.5 06/26/2020       Subjective:      Review of Systems   Constitutional: Negative for fatigue, fever and unexpected weight change.    HENT: Negative for ear discharge, ear pain, mouth sores, sore throat and trouble swallowing. Eyes: Negative for discharge, itching and visual disturbance. Respiratory: Negative for cough, choking, shortness of breath, wheezing and stridor. Cardiovascular: Negative for chest pain, palpitations and leg swelling. Gastrointestinal: Negative for abdominal distention, abdominal pain, blood in stool, constipation, diarrhea, nausea and vomiting. Endocrine: Negative for cold intolerance, polydipsia and polyuria. Genitourinary: Negative for difficulty urinating, dysuria, frequency and urgency. Musculoskeletal: Positive for arthralgias and back pain. Negative for gait problem. Skin: Negative for color change and rash. Allergic/Immunologic: Negative for food allergies and immunocompromised state. Neurological: Negative for dizziness, tremors, syncope, speech difficulty, weakness and headaches. Sleep apnea   Hematological: Negative for adenopathy. Does not bruise/bleed easily. Psychiatric/Behavioral: Negative for confusion and hallucinations. The patient is nervous/anxious. Dementia       Objective:     Physical Exam  Constitutional:       General: She is not in acute distress. Appearance: She is well-developed. HENT:      Head: Normocephalic and atraumatic. Eyes:      General: No scleral icterus. Right eye: No discharge. Left eye: No discharge. Pupils: Pupils are equal, round, and reactive to light. Neck:      Musculoskeletal: Normal range of motion and neck supple. Thyroid: No thyromegaly. Vascular: No JVD. Cardiovascular:      Rate and Rhythm: Normal rate and regular rhythm. Heart sounds: Normal heart sounds. No murmur. Pulmonary:      Effort: Pulmonary effort is normal. No respiratory distress. Breath sounds: Normal breath sounds. No wheezing or rales. Abdominal:      General: Bowel sounds are normal. There is no distension.       Palpations: Abdomen is soft. There is no mass. Tenderness: There is no abdominal tenderness. There is no guarding or rebound. Musculoskeletal: Normal range of motion. General: No tenderness. Skin:     General: Skin is warm and dry. Findings: No erythema or rash. Neurological:      Mental Status: She is alert and oriented to person, place, and time. Cranial Nerves: No cranial nerve deficit. Coordination: Coordination normal.      Gait: Gait abnormal.      Deep Tendon Reflexes: Reflexes are normal and symmetric. Reflexes normal.      Comments: Some memory deficit is noticeable    Her gait is altered due to her back pain   Psychiatric:         Mood and Affect: Mood is not depressed. Behavior: Behavior normal.         Thought Content: Thought content normal.         Judgment: Judgment normal.       BP (!) 150/75   Pulse 81   Ht 5' 2\" (1.575 m)   Wt 225 lb (102.1 kg)   BMI 41.15 kg/m²     Assessment:       Diagnosis Orders   1. Essential hypertension  CBC Auto Differential    Comprehensive Metabolic Panel    TSH without Reflex    Urinalysis Reflex to Culture   2. Need for influenza vaccination  INFLUENZA, QUADV, ADJUVANTED, 65 YRS =, IM, PF, PREFILL SYR, 0.5ML (FLUAD)   3. Anxiety     4. Chronic pain syndrome     5. Spinal stenosis at L4-L5 level     6. Other sleep apnea     7. Lumbar facet arthropathy     8. Mixed hyperlipidemia  Lipid Panel   9. Vitamin D deficiency  Vitamin D 25 Hydroxy     Labs reviewedfrom she did not get her labs  Diagnostics reviewed from she gets bone densities and mammograms with [facility] L[facility]ta Lemos [facility]:        Patient given educational materials - see patient instructions. Discussed use, benefit, and side effects of prescribed medications. Allpatient questions answered. Pt voiced understanding. Reviewed health maintenance. Instructed to continue current medications, diet and exercise. Patient agreed with treatment plan. Follow up as directed. MEDICATIONS:  Orders Placed This Encounter   Medications    donepezil (ARICEPT) 10 MG tablet     Sig: Take 1 tablet by mouth nightly     Dispense:  90 tablet     Refill:  1         ORDERS:  Orders Placed This Encounter   Procedures    INFLUENZA, QUADV, ADJUVANTED, 65 YRS =, IM, PF, PREFILL SYR, 0.5ML (FLUAD)    CBC Auto Differential    Comprehensive Metabolic Panel    Lipid Panel    Vitamin D 25 Hydroxy    TSH without Reflex    Urinalysis Reflex to Culture       Follow-up:  Return in about 6 months (around 4/5/2021) for have labs done prior to appt. PATIENT INSTRUCTIONS:  Patient Instructions   1. Hypertension; stable for now;   2. SOB:  She wants to defer for now  3. Health maintenance with DR Kyle Harris;   4. Anxiety; Stable with xanax  5. Chronic pain; Stable with Dr Symone Baker  6. Sleep apnea; It would be beneficial to start using your mask again   7. Memory impairment; Increase aricept to 10 mg nightly    Take to what you have now to you run out and I have gone ahead and sent you a 10 mg into your mail order pharmacy    Electronically signed by TAMIA Jacinto on 10/5/2020 at 3:14 PM    EMRDragon/transcription disclaimer:  Much of this encounter note is electronic transcription/translation of spoken language to printed texts. The electronic translation of spoken language may be erroneous, or at times,nonsensical words or phrases may be inadvertently transcribed.   Although I have reviewed the note for such errors, some may still exist.

## 2020-10-06 DIAGNOSIS — F41.9 ANXIETY: ICD-10-CM

## 2020-10-08 NOTE — TELEPHONE ENCOUNTER
Pt requesting refill. Her last office visit with you was on 08/11/2020 for anxiety.  Thanks,  Joselin

## 2020-10-09 RX ORDER — VENLAFAXINE 25 MG/1
TABLET ORAL
Qty: 30 TABLET | Refills: 1 | Status: SHIPPED | OUTPATIENT
Start: 2020-10-09 | End: 2020-10-26 | Stop reason: SDUPTHER

## 2020-10-22 DIAGNOSIS — F41.9 ANXIETY: ICD-10-CM

## 2020-10-22 RX ORDER — VENLAFAXINE 25 MG/1
25 TABLET ORAL DAILY
Qty: 30 TABLET | Refills: 1 | Status: CANCELLED | OUTPATIENT
Start: 2020-10-22

## 2020-10-26 DIAGNOSIS — F41.9 ANXIETY: ICD-10-CM

## 2020-10-26 RX ORDER — VENLAFAXINE 25 MG/1
25 TABLET ORAL DAILY
Qty: 30 TABLET | Refills: 2 | Status: SHIPPED | OUTPATIENT
Start: 2020-10-26 | End: 2020-11-10 | Stop reason: SDUPTHER

## 2020-10-26 RX ORDER — SERTRALINE HYDROCHLORIDE 25 MG/1
25 TABLET, FILM COATED ORAL DAILY
Qty: 30 TABLET | Refills: 2 | Status: SHIPPED | OUTPATIENT
Start: 2020-10-26 | End: 2020-11-10 | Stop reason: SDUPTHER

## 2020-11-04 RX ORDER — SUMATRIPTAN 50 MG/1
50 TABLET, FILM COATED ORAL 2 TIMES DAILY PRN
Qty: 180 TABLET | Refills: 1 | Status: SHIPPED | OUTPATIENT
Start: 2020-11-04 | End: 2021-07-13 | Stop reason: SDUPTHER

## 2020-11-10 ENCOUNTER — OFFICE VISIT (OUTPATIENT)
Dept: OBSTETRICS AND GYNECOLOGY | Facility: CLINIC | Age: 80
End: 2020-11-10

## 2020-11-10 VITALS
DIASTOLIC BLOOD PRESSURE: 82 MMHG | HEIGHT: 62 IN | BODY MASS INDEX: 41.04 KG/M2 | WEIGHT: 223 LBS | SYSTOLIC BLOOD PRESSURE: 132 MMHG

## 2020-11-10 DIAGNOSIS — F51.01 PRIMARY INSOMNIA: ICD-10-CM

## 2020-11-10 DIAGNOSIS — Z51.81 ENCOUNTER FOR THERAPEUTIC DRUG LEVEL MONITORING: ICD-10-CM

## 2020-11-10 DIAGNOSIS — F41.9 ANXIETY: ICD-10-CM

## 2020-11-10 PROCEDURE — 99213 OFFICE O/P EST LOW 20 MIN: CPT | Performed by: OBSTETRICS & GYNECOLOGY

## 2020-11-10 RX ORDER — BUTALBITAL, ACETAMINOPHEN AND CAFFEINE 50; 325; 40 MG/1; MG/1; MG/1
TABLET ORAL
COMMUNITY
Start: 2020-09-17 | End: 2021-04-30

## 2020-11-10 RX ORDER — DONEPEZIL HYDROCHLORIDE 10 MG/1
10 TABLET, FILM COATED ORAL
COMMUNITY
Start: 2020-10-05 | End: 2022-05-10

## 2020-11-10 NOTE — PROGRESS NOTES
"Subjective   Chief Complaint   Patient presents with   • Anxiety     pt here today for refill on anxiety meds. pt voices xanax, xoloft, and effexor. pt voices her xanax is not working as much as it was before. pt voices no other concerns.      Jennifer Gomes is a 80 y.o. year old .  No LMP recorded (lmp unknown). Patient has had a hysterectomy.  Overall, the patient feels like her anxiety is better, but she still feels like she is not getting enough xanax to help her sleep at night.  Patient still feels like xanax is still helping her sleep at night, but feels like it is not as effective as it used to be - she is taking some before bedtime, but she takes more at 2 AM to help her sleep the rest of the night.  Patient describes herself as \"very depressed\"; her daughter had moved in with her again - but she is doing a little bit better since she and her  have withdrawn from the situation a little bit.  Jennifer is no long trying to police her daughter's behavior and searching the house for evidence that she has consumed EtOH.    The following portions of the patient's history were reviewed and updated as appropriate:current medications and allergies    Social History    Tobacco Use      Smoking status: Never Smoker      Smokeless tobacco: Never Used    Review of Systems   Constitutional: Negative for activity change and unexpected weight change.   Respiratory: Negative for shortness of breath.    Cardiovascular: Negative for chest pain.   Gastrointestinal: Positive for constipation (chronic, takes stool softeners tid). Negative for abdominal pain.   Musculoskeletal:        Walking with two canes and also using a wheelchair today   Psychiatric/Behavioral: Positive for sleep disturbance. Negative for dysphoric mood, self-injury and suicidal ideas. The patient is not nervous/anxious.         Patient feels like her affect is too flat and wants to try to eventually get off her Effexor.  Patient feels like her " "mood is very down now         Objective   /82   Ht 157.5 cm (62\")   Wt 101 kg (223 lb)   LMP  (LMP Unknown)   BMI 40.79 kg/m²     Physical Exam   Constitutional: She is oriented to person, place, and time. She appears well-developed. No distress.   HENT:   Head: Normocephalic and atraumatic.   Neck: Normal range of motion. No thyromegaly present.   Pulmonary/Chest: Effort normal.   Abdominal: Soft. She exhibits no distension. There is no abdominal tenderness.   Musculoskeletal:      Comments:  Patient in a wheelchair today and requires two canes to transition back and forth from chair   Neurological: She is alert and oriented to person, place, and time.   Skin: Skin is warm and dry.   Psychiatric: Her behavior is normal. Judgment normal.   Nursing note and vitals reviewed.      Lab Review   No data reviewed    Imaging   No data reviewed     Assessment & Plan  Jennifer was seen today for med refill.    Diagnoses and all orders for this viisit:    Anxiety: Patient uses to help with sleep at night.  Patient is requesting increase in xanax since she is not taking it twice every night to help her sleep - Jennifer takes xanax before going to bed, and then again at about 2 am.  -     ALPRAZolam (Xanax) 0.5 MG tablet; Take 2 tablets by mouth 3 (Three) Times a Day.    Moderate episode of recurrent major depressive disorder (CMS/HCC): Very depressed.  Patient does not want to increase Effexor dose because it makes her too flat.  Added a small dose of zoloft at her last visit and she feels like this combination has been helpful.  She asked for elavil several months ago, but I declined since she is already taking percocet and xanax - I feared she would be too drowsy.  Will continue current doses of Effexor and Zoloft.  Patient specifically denies any SI/HI    Primary insomnia  -     ALPRAZolam (Xanax) 0.5 MG tablet; Take 2 tablets by mouth 2 (Two) Times a Day.    Postmenopausal    Morbidly obese (CMS/HCC)    RTO in 3 " months    This note was electronically signed.    Shasta Madrigal MD  November 10, 2020  15:00 CST    Total time spent today with Jennifer  was 20 minutes (level 3).  Greater than 50% of the time was spent coordinating care, answering her questions and counseling regarding pathophysiology of her presenting problem along with plans for any diagnositc work-up and treatment.

## 2020-11-12 RX ORDER — SERTRALINE HYDROCHLORIDE 25 MG/1
25 TABLET, FILM COATED ORAL DAILY
Qty: 30 TABLET | Refills: 2 | Status: SHIPPED | OUTPATIENT
Start: 2020-11-12 | End: 2021-02-03 | Stop reason: SDUPTHER

## 2020-11-12 RX ORDER — VENLAFAXINE 25 MG/1
25 TABLET ORAL DAILY
Qty: 30 TABLET | Refills: 2 | Status: SHIPPED | OUTPATIENT
Start: 2020-11-12 | End: 2021-01-27

## 2020-11-12 RX ORDER — ALPRAZOLAM 0.5 MG/1
1 TABLET ORAL 3 TIMES DAILY PRN
Qty: 120 TABLET | Refills: 2 | Status: SHIPPED | OUTPATIENT
Start: 2020-11-12 | End: 2021-02-03 | Stop reason: SDUPTHER

## 2020-11-14 LAB
AMPHETAMINES UR QL SCN: NEGATIVE NG/ML
BARBITURATES UR QL SCN: POSITIVE NG/ML
BENZODIAZ UR QL SCN: POSITIVE NG/ML
BZE UR QL SCN: NEGATIVE NG/ML
CANNABINOIDS UR QL SCN: NEGATIVE NG/ML
CREAT UR-MCNC: 134.4 MG/DL (ref 20–300)
LABORATORY COMMENT REPORT: ABNORMAL
METHADONE UR QL SCN: NEGATIVE NG/ML
OPIATES UR QL SCN: POSITIVE NG/ML
OXYCODONE+OXYMORPHONE UR QL SCN: POSITIVE NG/ML
PCP UR QL: NEGATIVE NG/ML
PH UR: 5.4 [PH] (ref 4.5–8.9)
PROPOXYPH UR QL SCN: NEGATIVE NG/ML

## 2021-01-04 RX ORDER — LANSOPRAZOLE 30 MG/1
CAPSULE, DELAYED RELEASE ORAL
Qty: 90 CAPSULE | Refills: 1 | Status: SHIPPED | OUTPATIENT
Start: 2021-01-04 | End: 2021-06-04 | Stop reason: SDUPTHER

## 2021-01-04 RX ORDER — LANSOPRAZOLE 30 MG/1
CAPSULE, DELAYED RELEASE ORAL
Qty: 30 CAPSULE | Refills: 0 | Status: SHIPPED | OUTPATIENT
Start: 2021-01-04 | End: 2021-07-13

## 2021-01-04 NOTE — TELEPHONE ENCOUNTER
Glenn Canseco called requesting a refill of the below medication which has been pended for you:     Requested Prescriptions     Pending Prescriptions Disp Refills    lansoprazole (PREVACID) 30 MG delayed release capsule 30 capsule 0     Sig: TAKE 1 CAPSULE DAILY EVERY MORNING       Last Appointment Date: 10/5/2020  Next Appointment Date: 4/5/2021    Allergies   Allergen Reactions    Ambien [Zolpidem Tartrate]     Codeine     Lyrica [Pregabalin]     Morphine     Requip [Ropinirole Hcl]     Tizanidine      Terrible nightmares

## 2021-01-26 DIAGNOSIS — F41.9 ANXIETY: ICD-10-CM

## 2021-01-27 RX ORDER — VENLAFAXINE 25 MG/1
TABLET ORAL
Qty: 30 TABLET | Refills: 2 | Status: SHIPPED | OUTPATIENT
Start: 2021-01-27 | End: 2021-02-03 | Stop reason: SDUPTHER

## 2021-02-01 ENCOUNTER — TELEPHONE (OUTPATIENT)
Dept: OBSTETRICS AND GYNECOLOGY | Facility: CLINIC | Age: 81
End: 2021-02-01

## 2021-02-01 NOTE — TELEPHONE ENCOUNTER
Pt called requested a refill on her Xanax  PT was advised that she will need to come in for an appt Pt was transferred to scheduling for an appt.

## 2021-02-03 ENCOUNTER — OFFICE VISIT (OUTPATIENT)
Dept: OBSTETRICS AND GYNECOLOGY | Facility: CLINIC | Age: 81
End: 2021-02-03

## 2021-02-03 VITALS
WEIGHT: 213 LBS | BODY MASS INDEX: 39.2 KG/M2 | DIASTOLIC BLOOD PRESSURE: 86 MMHG | SYSTOLIC BLOOD PRESSURE: 126 MMHG | HEIGHT: 62 IN

## 2021-02-03 DIAGNOSIS — F41.9 ANXIETY: ICD-10-CM

## 2021-02-03 DIAGNOSIS — F51.01 PRIMARY INSOMNIA: ICD-10-CM

## 2021-02-03 PROCEDURE — 99213 OFFICE O/P EST LOW 20 MIN: CPT | Performed by: OBSTETRICS & GYNECOLOGY

## 2021-02-03 RX ORDER — ALPRAZOLAM 1 MG/1
1 TABLET ORAL 3 TIMES DAILY PRN
Qty: 90 TABLET | Refills: 2 | Status: SHIPPED | OUTPATIENT
Start: 2021-02-03 | End: 2021-04-30 | Stop reason: SDUPTHER

## 2021-02-03 RX ORDER — SERTRALINE HYDROCHLORIDE 25 MG/1
25 TABLET, FILM COATED ORAL DAILY
Qty: 90 TABLET | Refills: 3 | Status: SHIPPED | OUTPATIENT
Start: 2021-02-03 | End: 2021-08-11 | Stop reason: SDUPTHER

## 2021-02-03 RX ORDER — VENLAFAXINE 25 MG/1
25 TABLET ORAL DAILY
Qty: 90 TABLET | Refills: 3 | Status: SHIPPED | OUTPATIENT
Start: 2021-02-03 | End: 2021-08-11 | Stop reason: SDUPTHER

## 2021-02-03 NOTE — PROGRESS NOTES
"Subjective   Chief Complaint   Patient presents with   • Med Refill     pt here today for refill on xanax. pt says she is not sleeping well. pt voices no other concerns.      Jennifer Gomes is a 80 y.o. year old .  No LMP recorded (lmp unknown). Patient has had a hysterectomy.  Patient has stopped taking her oxycodone abruptly - she did have withdrawal, but says that she is over than.  Patient was starting to have a paradoxical reaction and says that it was causing her more pain. Overall, the patient feels like her anxiety is better, but she still feels like she is not getting enough xanax to help her sleep at night.  Patient ran out last month and did not have enough medication to get her through last month.  Patient has been without the xanax for about a week now and has not had any sleep.  Patient really struggling with her chronic pain.    The following portions of the patient's history were reviewed and updated as appropriate:current medications and allergies    Social History    Tobacco Use      Smoking status: Never Smoker      Smokeless tobacco: Never Used    Review of Systems   Constitutional: Negative for activity change and unexpected weight change.   Respiratory: Negative for shortness of breath.    Cardiovascular: Negative for chest pain.   Gastrointestinal: Positive for constipation (chronic, takes stool softeners tid). Negative for abdominal pain.   Musculoskeletal: Positive for arthralgias and back pain.        Walking with two canes and also using a wheelchair today   Psychiatric/Behavioral: Positive for sleep disturbance. Negative for dysphoric mood, self-injury and suicidal ideas. The patient is not nervous/anxious.         Patient feels like her affect is much better on the combination of effexor and zoloft         Objective   /86   Ht 157.5 cm (62\")   Wt 96.6 kg (213 lb)   LMP  (LMP Unknown)   BMI 38.96 kg/m²     Physical Exam   Constitutional: She is oriented to person, place, " and time. She appears well-developed. No distress.   HENT:   Head: Normocephalic and atraumatic.   Neck: Normal range of motion. No thyromegaly present.   Pulmonary/Chest: Effort normal.   Musculoskeletal:      Comments:  Patient in a wheelchair today and requires two canes to transition back and forth from chair   Neurological: She is alert and oriented to person, place, and time.   Skin: Skin is warm and dry.   Psychiatric: Her behavior is normal. Judgment normal.   Nursing note and vitals reviewed.      Lab Review   No data reviewed    Imaging   No data reviewed     Assessment & Plan  Jennifer was seen today for med refill.    Diagnoses and all orders for this viisit:    Anxiety: Patient uses to help with sleep at night.  Patient is requesting increase in xanax since she is not taking it twice every night to help her sleep - Jennifer takes xanax before going to bed, and then again at about 2 am.  -     ALPRAZolam (Xanax) 0.5 MG tablet; TID prn, #90.    Moderate episode of recurrent major depressive disorder (CMS/HCC): Mood significantly better on combination of effexor and zoloft.  Patient very pleased.  Primary insomnia  -     ALPRAZolam (Xanax) 1 MG tablet; TID prn, #90.    Postmenopausal    Morbidly obese (CMS/HCC): Patient has lost 10 pounds, positive reinforcement given.    RTO in 3 months    This note was electronically signed.    Shasta Madrigal MD  February 3, 2021  10:42 CST    Total time spent today with Jennifer  was 20 minutes (level 3).  Greater than 50% of the time was spent coordinating care, answering her questions and counseling regarding pathophysiology of her presenting problem along with plans for any diagnositc work-up and treatment.

## 2021-02-09 RX ORDER — DONEPEZIL HYDROCHLORIDE 10 MG/1
10 TABLET, FILM COATED ORAL NIGHTLY
Qty: 90 TABLET | Refills: 1 | Status: SHIPPED | OUTPATIENT
Start: 2021-02-09 | End: 2021-06-04 | Stop reason: SDUPTHER

## 2021-02-09 NOTE — TELEPHONE ENCOUNTER
G[facility] called requesting a refill of the below medication which has been pended for you:     Requested Prescriptions      No prescriptions requested or ordered in this encounter       Last Appointment Date: 10/5/2020  Next Appointment Date: 4/5/2021    Allergies   Allergen Reactions    Ambien [Zolpidem Tartrate]     Codeine     Lyrica [Pregabalin]     Morphine     Requip [Ropinirole Hcl]     Tizanidine      Terrible nightmares

## 2021-03-01 RX ORDER — LEVOTHYROXINE SODIUM 0.05 MG/1
TABLET ORAL
Qty: 90 TABLET | Refills: 3 | Status: SHIPPED | OUTPATIENT
Start: 2021-03-01 | End: 2021-07-13 | Stop reason: SDUPTHER

## 2021-03-01 NOTE — TELEPHONE ENCOUNTER
Debbi Bolaños called requesting a refill of the below medication which has been pended for you:     Requested Prescriptions     Pending Prescriptions Disp Refills    levothyroxine (SYNTHROID) 50 MCG tablet [Pharmacy Med Name: L-THYROXINE (SYNTHROID) TABS 50MCG] 90 tablet 3     Sig: TAKE 1 TABLET DAILY       Last Appointment Date: 10/5/2020  Next Appointment Date: 4/5/2021    Allergies   Allergen Reactions    Ambien [Zolpidem Tartrate]     Codeine     Lyrica [Pregabalin]     Morphine     Requip [Ropinirole Hcl]     Tizanidine      Terrible nightmares

## 2021-03-29 RX ORDER — ATORVASTATIN CALCIUM 40 MG/1
TABLET, FILM COATED ORAL
Qty: 90 TABLET | Refills: 3 | Status: SHIPPED | OUTPATIENT
Start: 2021-03-29 | End: 2021-07-13 | Stop reason: SDUPTHER

## 2021-04-02 DIAGNOSIS — E55.9 VITAMIN D DEFICIENCY: ICD-10-CM

## 2021-04-02 DIAGNOSIS — I10 ESSENTIAL HYPERTENSION: ICD-10-CM

## 2021-04-02 DIAGNOSIS — E78.2 MIXED HYPERLIPIDEMIA: ICD-10-CM

## 2021-04-02 LAB
ALBUMIN SERPL-MCNC: 4 G/DL (ref 3.5–5.2)
ALP BLD-CCNC: 85 U/L (ref 35–104)
ALT SERPL-CCNC: 15 U/L (ref 5–33)
ANION GAP SERPL CALCULATED.3IONS-SCNC: 12 MMOL/L (ref 7–19)
AST SERPL-CCNC: 16 U/L (ref 5–32)
BACTERIA: NEGATIVE /HPF
BASOPHILS ABSOLUTE: 0 K/UL (ref 0–0.2)
BASOPHILS RELATIVE PERCENT: 0.4 % (ref 0–1)
BILIRUB SERPL-MCNC: 0.3 MG/DL (ref 0.2–1.2)
BILIRUBIN URINE: NEGATIVE
BLOOD, URINE: NEGATIVE
BUN BLDV-MCNC: 25 MG/DL (ref 8–23)
CALCIUM SERPL-MCNC: 9.4 MG/DL (ref 8.8–10.2)
CHLORIDE BLD-SCNC: 105 MMOL/L (ref 98–111)
CHOLESTEROL, TOTAL: 194 MG/DL (ref 160–199)
CLARITY: CLEAR
CO2: 25 MMOL/L (ref 22–29)
COLOR: YELLOW
CREAT SERPL-MCNC: 0.9 MG/DL (ref 0.5–0.9)
CRYSTALS, UA: ABNORMAL /HPF
EOSINOPHILS ABSOLUTE: 0.5 K/UL (ref 0–0.6)
EOSINOPHILS RELATIVE PERCENT: 6.2 % (ref 0–5)
EPITHELIAL CELLS, UA: 2 /HPF (ref 0–5)
GFR AFRICAN AMERICAN: >59
GFR NON-AFRICAN AMERICAN: >60
GLUCOSE BLD-MCNC: 108 MG/DL (ref 74–109)
GLUCOSE URINE: NEGATIVE MG/DL
HCT VFR BLD CALC: 39.1 % (ref 37–47)
HDLC SERPL-MCNC: 57 MG/DL (ref 65–121)
HEMOGLOBIN: 12.6 G/DL (ref 12–16)
HYALINE CASTS: 6 /HPF (ref 0–8)
IMMATURE GRANULOCYTES #: 0 K/UL
KETONES, URINE: NEGATIVE MG/DL
LDL CHOLESTEROL CALCULATED: 105 MG/DL
LEUKOCYTE ESTERASE, URINE: ABNORMAL
LYMPHOCYTES ABSOLUTE: 1.4 K/UL (ref 1.1–4.5)
LYMPHOCYTES RELATIVE PERCENT: 18.1 % (ref 20–40)
MCH RBC QN AUTO: 30.3 PG (ref 27–31)
MCHC RBC AUTO-ENTMCNC: 32.2 G/DL (ref 33–37)
MCV RBC AUTO: 94 FL (ref 81–99)
MONOCYTES ABSOLUTE: 0.6 K/UL (ref 0–0.9)
MONOCYTES RELATIVE PERCENT: 7.5 % (ref 0–10)
NEUTROPHILS ABSOLUTE: 5.2 K/UL (ref 1.5–7.5)
NEUTROPHILS RELATIVE PERCENT: 67.5 % (ref 50–65)
NITRITE, URINE: NEGATIVE
PDW BLD-RTO: 12.7 % (ref 11.5–14.5)
PH UA: 5.5 (ref 5–8)
PLATELET # BLD: 192 K/UL (ref 130–400)
PMV BLD AUTO: 10.6 FL (ref 9.4–12.3)
POTASSIUM SERPL-SCNC: 4.6 MMOL/L (ref 3.5–5)
PROTEIN UA: ABNORMAL MG/DL
RBC # BLD: 4.16 M/UL (ref 4.2–5.4)
RBC UA: 3 /HPF (ref 0–4)
SODIUM BLD-SCNC: 142 MMOL/L (ref 136–145)
SPECIFIC GRAVITY UA: 1.03 (ref 1–1.03)
TOTAL PROTEIN: 6.8 G/DL (ref 6.6–8.7)
TRIGL SERPL-MCNC: 160 MG/DL (ref 0–149)
TSH SERPL DL<=0.05 MIU/L-ACNC: 2.57 UIU/ML (ref 0.27–4.2)
UROBILINOGEN, URINE: 0.2 E.U./DL
VITAMIN D 25-HYDROXY: 51 NG/ML
WBC # BLD: 7.7 K/UL (ref 4.8–10.8)
WBC UA: 20 /HPF (ref 0–5)

## 2021-04-04 LAB — URINE CULTURE, ROUTINE: NORMAL

## 2021-04-05 ENCOUNTER — OFFICE VISIT (OUTPATIENT)
Dept: INTERNAL MEDICINE | Age: 81
End: 2021-04-05
Payer: MEDICARE

## 2021-04-05 VITALS
SYSTOLIC BLOOD PRESSURE: 135 MMHG | HEIGHT: 62 IN | HEART RATE: 70 BPM | DIASTOLIC BLOOD PRESSURE: 85 MMHG | BODY MASS INDEX: 41.41 KG/M2 | WEIGHT: 225 LBS

## 2021-04-05 DIAGNOSIS — E55.9 VITAMIN D DEFICIENCY: ICD-10-CM

## 2021-04-05 DIAGNOSIS — I10 ESSENTIAL HYPERTENSION: ICD-10-CM

## 2021-04-05 DIAGNOSIS — Z00.00 HEALTHCARE MAINTENANCE: ICD-10-CM

## 2021-04-05 DIAGNOSIS — E03.9 HYPOTHYROIDISM (ACQUIRED): ICD-10-CM

## 2021-04-05 DIAGNOSIS — R53.1 WEAKNESS: ICD-10-CM

## 2021-04-05 DIAGNOSIS — N18.30 STAGE 3 CHRONIC KIDNEY DISEASE, UNSPECIFIED WHETHER STAGE 3A OR 3B CKD (HCC): ICD-10-CM

## 2021-04-05 DIAGNOSIS — I25.10 CORONARY ARTERY DISEASE INVOLVING NATIVE CORONARY ARTERY OF NATIVE HEART WITHOUT ANGINA PECTORIS: Primary | ICD-10-CM

## 2021-04-05 DIAGNOSIS — G47.39 OTHER SLEEP APNEA: ICD-10-CM

## 2021-04-05 DIAGNOSIS — M79.10 MUSCLE PAIN: ICD-10-CM

## 2021-04-05 DIAGNOSIS — Z78.0 POST-MENOPAUSAL: ICD-10-CM

## 2021-04-05 DIAGNOSIS — K59.03 DRUG-INDUCED CONSTIPATION: ICD-10-CM

## 2021-04-05 PROCEDURE — 99214 OFFICE O/P EST MOD 30 MIN: CPT | Performed by: NURSE PRACTITIONER

## 2021-04-05 PROCEDURE — G8417 CALC BMI ABV UP PARAM F/U: HCPCS | Performed by: NURSE PRACTITIONER

## 2021-04-05 PROCEDURE — 1090F PRES/ABSN URINE INCON ASSESS: CPT | Performed by: NURSE PRACTITIONER

## 2021-04-05 PROCEDURE — G8400 PT W/DXA NO RESULTS DOC: HCPCS | Performed by: NURSE PRACTITIONER

## 2021-04-05 PROCEDURE — 1123F ACP DISCUSS/DSCN MKR DOCD: CPT | Performed by: NURSE PRACTITIONER

## 2021-04-05 PROCEDURE — 1036F TOBACCO NON-USER: CPT | Performed by: NURSE PRACTITIONER

## 2021-04-05 PROCEDURE — 4040F PNEUMOC VAC/ADMIN/RCVD: CPT | Performed by: NURSE PRACTITIONER

## 2021-04-05 PROCEDURE — G8427 DOCREV CUR MEDS BY ELIG CLIN: HCPCS | Performed by: NURSE PRACTITIONER

## 2021-04-05 RX ORDER — DICLOFENAC SODIUM 75 MG/1
TABLET, DELAYED RELEASE ORAL
COMMUNITY
Start: 2021-03-25 | End: 2021-11-29 | Stop reason: SDUPTHER

## 2021-04-05 ASSESSMENT — ENCOUNTER SYMPTOMS
WHEEZING: 0
COUGH: 0
ABDOMINAL PAIN: 0
CONSTIPATION: 1
COLOR CHANGE: 0
ABDOMINAL DISTENTION: 0
EYE ITCHING: 0
VOMITING: 0
SORE THROAT: 0
BLOOD IN STOOL: 0
DIARRHEA: 0
SHORTNESS OF BREATH: 0
EYE DISCHARGE: 0
CHOKING: 0
STRIDOR: 0
TROUBLE SWALLOWING: 0
NAUSEA: 0
BACK PAIN: 1

## 2021-04-05 ASSESSMENT — PATIENT HEALTH QUESTIONNAIRE - PHQ9
2. FEELING DOWN, DEPRESSED OR HOPELESS: 0
1. LITTLE INTEREST OR PLEASURE IN DOING THINGS: 0
SUM OF ALL RESPONSES TO PHQ QUESTIONS 1-9: 0

## 2021-04-05 NOTE — PATIENT INSTRUCTIONS
1. CAD; The current medical regimen is effective;  continue present plan and medications. 2.  HTN good today; no changes  3. Sleep apnea; she is not wearing her mask  4. Hypothyroidisn; The current medical regimen is effective;  continue present plan and medications. 5.  Hyperlipidemia . Zurdo Scott The current medical regimen is effective;  continue present plan and medications. 6. Vit d def; The current medical regimen is effective;  continue present plan and medications. 7.  CKD: We will need to check in 1 month as you has voltaren from Pain management  8.  chornic pain syndrome; Followed by Pain management  Refer to DR Mary Jo Quintana; and OUTpatient therapy at [facility]    9.   Health maintenance make betty with Dr Shara Calabrese for colonoscopy   Bone density  Set   10  OAB:  myrbetriq 25 mg daily

## 2021-04-05 NOTE — PROGRESS NOTES
[facility] PHYSICIAN SERVICES  [facility] INTERNAL MEDICINE  [address] [address]  [facility] [place] [address]  Dept: [phone]  Dept Fax: [phone]: [phone]    Harley Estrada (:  1940) is a 68 y.o. female,Established patient, here for evaluation of the following chief complaint(s): Hypertension (Patient is here for routine follow up visit and review of labs done 2021)      Harley Estrada is a 68 y.o. female who presents today for her medical conditions/complaints as noted below. Harley Estrada is c/maame Hypertension (Patient is here for routine follow up visit and review of labs done 2021)        HPI:     HPI   1. Coronary artery disease she said no recent chest pain or palpitations  2. Hypertension she is currently taking carvedilol  twice daily Bumex 1 mg daily  3. Sleep apnea she is not using it   4. Hypothyroidism blood work today is good she is on 50 mcg daily changes will be necessary  #5 hyperlipidemia she is on Lipitor 40 daily her cholesterol today is 197 triglycerides are 160  #6 vitamin D deficiency she is on 50,000 weekly her level is good at 51  #7 chronic kidney disease GFR today is back up to 60 we have done some adjustments to her diuretics to help with this. 8.  Chronic pain syndrome; She stopped oxycodone in ;  She has  Pain in her legs after stopping; This lasted for 7 days; Since then she feels she is having muscle pain; she has been taking volatren; She still goes ti pain management; she was having to take 18 stool softeners every day while on oxy; She is down now to 3 a day; She is still constipated;    9.  Health maintenance  Will set up colon with Dr Ashley Calvo          Chief Complaint   Patient presents with    Hypertension     Patient is here for routine follow up visit and review of labs done 2021       Past Medical History:   Diagnosis Date    Adenocarcinoma of endometrium, stage 1 ([facility] [address] 2017    Anemia     Arthritis     CAD (coronary artery disease)     \"Stiff Valve\"    Chronic kidney disease     Depression     Fibrocystic breast disease     5/2/17 biopsy - benign FCBD w/microcalcifications - [facility]     GERD (gastroesophageal reflux disease)     H/O vaginal bleeding     managed  [facility]    Hypertension     Hypothyroidism (acquired)     Hypothyroidism (acquired)     Mixed hyperlipidemia     Obesity     Osteoarthritis     Pain of both hip joints 1/18/2016    Situational anxiety     Sleep apnea     Vitamin D deficiency       Past Surgical History:   Procedure Laterality Date    BREAST BIOPSY Right     BREAST SURGERY Left     Biopsy, Benign    CYST REMOVAL Left     Shoulder cyst, benign    DILATION AND CURETTAGE OF UTERUS      EYE SURGERY  03/2017    HYSTERECTOMY      Removed uterus and cervix due to cancer, 12/17    LEG SURGERY Left     Tib/Fib ORIF    TONSILLECTOMY         Vitals 4/5/2021 10/5/2020 6/29/2020 3/23/2020 1/22/2020 4/75/0418   SYSTOLIC 237 068 893 [phone]   DIASTOLIC 85 75 74 73 71 78   Site - - - - - -   Position - - - - - -   Pulse 70 81 70 92 87 88   Temp - - - 99 - -   Resp - - - - - 18   SpO2 - - 95 - - 98   Weight 225 lb 225 lb 200 lb - 200 lb 194 lb   Height 5' 2\" 5' 2\" 5' 2\" - 5' 2\" 5' 2\"   Body mass index 41.15 kg/m2 41.15 kg/m2 36.58 kg/m2 - 36.58 kg/m2 35.48 kg/m2   Pain Level - - - - - -   Some recent data might be hidden       Family History   Problem Relation Age of Onset    Cancer Mother        Social History     Tobacco Use    Smoking status: Never Smoker    Smokeless tobacco: Never Used   Substance Use Topics    Alcohol use: Yes     Comment: occassionally      Current Outpatient Medications   Medication Sig Dispense Refill    diclofenac (VOLTAREN) 75 MG EC tablet       mirabegron (MYRBETRIQ) 25 MG TB24 Take 1 tablet by mouth daily 90 tablet 1    atorvastatin (LIPITOR) 40 MG tablet TAKE 1 TABLET DAILY AS DIRECTED.  90 tablet 3    levothyroxine (SYNTHROID) 50 MCG tablet TAKE 1 TABLET DAILY 90 tablet 3    donepezil (ARICEPT) 10 MG tablet Take 1 tablet by mouth nightly 90 tablet 1    lansoprazole (PREVACID) 30 MG delayed release capsule TAKE 1 CAPSULE DAILY EVERY MORNING 30 capsule 0    lansoprazole (PREVACID) 30 MG delayed release capsule TAKE 1 CAPSULE DAILY EVERY MORNING 90 capsule 1    SUMAtriptan (IMITREX) 50 MG tablet Take 1 tablet by mouth 2 times daily as needed for Migraine (Max 2/day -- 3 month supply) 180 tablet 1    sertraline (ZOLOFT) 25 MG tablet       carvedilol (COREG) 12.5 MG tablet TAKE 1 TABLET TWICE A DAY 60 tablet 0    cyclobenzaprine (FLEXERIL) 10 MG tablet Take 1 tablet by mouth 2 times daily as needed for Muscle spasms (Patient taking differently: Take 5 mg by mouth 2 times daily as needed for Muscle spasms ) 180 tablet 1    bumetanide (BUMEX) 1 MG tablet TAKE 1 TABLET DAILY 90 tablet 3    ergocalciferol (ERGOCALCIFEROL) 1.25 MG (85160 UT) capsule Take 1 capsule by mouth once a week 12 capsule 3    ALPRAZolam (XANAX) 0.5 MG tablet       venlafaxine (EFFEXOR) 25 MG tablet       butalbital-acetaminophen-caffeine (FIORICET, ESGIC) -40 MG per tablet       Omega-3 Fatty Acids (FISH OIL) 1000 MG CAPS Take 2 capsules by mouth 2 times daily 180 capsule 2    Prenatal Multivit-Min-Fe-FA (PRENATAL 1 + IRON PO) Take by mouth      loratadine (CLARITIN) 10 MG capsule Take 10 mg by mouth daily      FIBER FORMULA PO Take by mouth       No current facility-administered medications for this visit.       Allergies   Allergen Reactions    Ambien [Zolpidem Tartrate]     Codeine     Lyrica [Pregabalin]     Morphine     Requip [Ropinirole Hcl]     Tizanidine      Terrible nightmares         Health Maintenance   Topic Date Due    Hepatitis C screen  Never done    DEXA (modify frequency per FRAX score)  Never done    DTaP/Tdap/Td vaccine (1 - Tdap) 04/26/2021 (Originally 9/19/1959)    Shingles Vaccine (2 of 3) 04/05/2022 (Originally 1/15/2014)    Annual Wellness Visit (AWV)  06/30/2021    Breast cancer screen  08/30/2021    Lipid screen  04/02/2022    TSH testing  04/02/2022    Potassium monitoring  04/02/2022    Creatinine monitoring  04/02/2022    Flu vaccine  Completed    Pneumococcal 65+ years Vaccine  Completed    COVID-19 Vaccine  Completed    Hepatitis A vaccine  Aged Out    Hepatitis B vaccine  Aged Out    Hib vaccine  Aged Out    Meningococcal (ACWY) vaccine  Aged Out       No results found for: LABA1C  No results found for: PSA, PSADIA  TSH   Date Value Ref Range Status   04/02/2021 2.570 0.270 - 4.200 uIU/mL Final   ]  Lab Results   Component Value Date     04/02/2021    K 4.6 04/02/2021     04/02/2021    CO2 25 04/02/2021    BUN 25 (H) 04/02/2021    CREATININE 0.9 04/02/2021    GLUCOSE 108 04/02/2021    CALCIUM 9.4 04/02/2021    PROT 6.8 04/02/2021    LABALBU 4.0 04/02/2021    BILITOT 0.3 04/02/2021    ALKPHOS 85 04/02/2021    AST 16 04/02/2021    ALT 15 04/02/2021    LABGLOM >60 04/02/2021    GFRAA >59 04/02/2021     Lab Results   Component Value Date    CHOL 194 04/02/2021    CHOL 176 06/26/2020    CHOL 237 (H) 01/21/2020     Lab Results   Component Value Date    TRIG 160 (H) 04/02/2021    TRIG 105 06/26/2020    TRIG 102 01/21/2020     Lab Results   Component Value Date    HDL 57 (L) 04/02/2021    HDL 60 (L) 06/26/2020    HDL 64 (L) 01/21/2020     Lab Results   Component Value Date    LDLCALC 105 04/02/2021    LDLCALC 95 06/26/2020    LDLCALC 153 01/21/2020     Lab Results   Component Value Date     04/02/2021    K 4.6 04/02/2021     04/02/2021    CO2 25 04/02/2021    BUN 25 04/02/2021    CREATININE 0.9 04/02/2021    GLUCOSE 108 04/02/2021    CALCIUM 9.4 04/02/2021      Lab Results   Component Value Date    WBC 7.7 04/02/2021    HGB 12.6 04/02/2021    HCT 39.1 04/02/2021    MCV 94.0 04/02/2021     04/02/2021    LYMPHOPCT 18.1 (L) 04/02/2021    RBC 4.16 (L) 04/02/2021    MCH 30.3 04/02/2021    MCHC 32.2 (L) 04/02/2021    RDW 12.7 04/02/2021     Lab Results   Component Value Date    VITD25 51.0 04/02/2021       Subjective:      Review of Systems   Constitutional: Negative for fatigue, fever and unexpected weight change. HENT: Negative for ear discharge, ear pain, mouth sores, sore throat and trouble swallowing. Eyes: Negative for discharge, itching and visual disturbance. Respiratory: Negative for cough, choking, shortness of breath, wheezing and stridor. Cardiovascular: Negative for chest pain, palpitations and leg swelling. Gastrointestinal: Positive for constipation. Negative for abdominal distention, abdominal pain, blood in stool, diarrhea, nausea and vomiting. Endocrine: Negative for cold intolerance, polydipsia and polyuria. Genitourinary: Negative for difficulty urinating, dysuria, frequency and urgency. Musculoskeletal: Positive for arthralgias, back pain and myalgias. Negative for gait problem. Skin: Negative for color change and rash. Allergic/Immunologic: Negative for food allergies and immunocompromised state. Neurological: Negative for dizziness, tremors, syncope, speech difficulty, weakness and headaches. Sleep apnea   Hematological: Negative for adenopathy. Does not bruise/bleed easily. Psychiatric/Behavioral: Negative for confusion and hallucinations. Memory issues       Objective:     Physical Exam  Constitutional:       General: She is not in acute distress. Appearance: She is well-developed. HENT:      Head: Normocephalic and atraumatic. Eyes:      General: No scleral icterus. Right eye: No discharge. Left eye: No discharge. Pupils: Pupils are equal, round, and reactive to light. Neck:      Musculoskeletal: Normal range of motion and neck supple. Thyroid: No thyromegaly. Vascular: No JVD. Cardiovascular:      Rate and Rhythm: Normal rate and regular rhythm. Heart sounds: Normal heart sounds. No murmur.    Pulmonary: Effort: Pulmonary effort is normal. No respiratory distress. Breath sounds: Normal breath sounds. No wheezing or rales. Abdominal:      General: Bowel sounds are normal. There is no distension. Palpations: Abdomen is soft. There is no mass. Tenderness: There is no abdominal tenderness. There is no guarding or rebound. Musculoskeletal: Normal range of motion. General: No tenderness. Skin:     General: Skin is warm and dry. Findings: No erythema or rash. Neurological:      Mental Status: She is alert and oriented to person, place, and time. Cranial Nerves: No cranial nerve deficit. Coordination: Coordination normal.      Deep Tendon Reflexes: Reflexes are normal and symmetric. Reflexes normal.   Psychiatric:         Mood and Affect: Mood is not depressed. Behavior: Behavior normal.         Thought Content: Thought content normal.         Judgment: Judgment normal.       /85   Pulse 70   Ht 5' 2\" (1.575 m)   Wt 225 lb (102.1 kg)   BMI 41.15 kg/m²     Assessment:       Diagnosis Orders   1. Coronary artery disease involving native coronary artery of native heart without angina pectoris     2. Essential hypertension  CBC Auto Differential    Comprehensive Metabolic Panel    TSH without Reflex    Urinalysis Reflex to Culture   3. Other sleep apnea     4. Vitamin D deficiency  Vitamin D 25 Hydroxy    Vitamin D 25 Hydroxy   5. Healthcare maintenance  CBC Auto Differential    Comprehensive Metabolic Panel    Hepatitis C Antibody    TSH without Reflex    Urinalysis Reflex to Culture    Vitamin D 25 Hydroxy   6. Muscle pain  AFL - Tyrone Garcia MD, Rheumatology, [facility]    External Referral To Physical Therapy   7. Post-menopausal  DEXA BONE DENSITY 2 SITES   8. Drug-induced constipation     9. Hypothyroidism (acquired)  TSH without Reflex   10.  Stage 3 chronic kidney disease, unspecified whether stage 3a or 3b CKD  Comprehensive Metabolic Panel Comprehensive Metabolic Panel   11. Weakness  External Referral To Physical Therapy     Labs reviewed from 4/2/2021  D    Plan:        Patient given educational materials - see patient instructions. Discussed use, benefit, and side effects of prescribed medications. Allpatient questions answered. Pt voiced understanding. Reviewed health maintenance. Instructed to continue current medications, diet and exercise. Patient agreed with treatment plan. Follow up as directed. MEDICATIONS:  Orders Placed This Encounter   Medications    mirabegron (MYRBETRIQ) 25 MG TB24     Sig: Take 1 tablet by mouth daily     Dispense:  90 tablet     Refill:  1         ORDERS:  Orders Placed This Encounter   Procedures    DEXA BONE DENSITY 2 SITES    CBC Auto Differential    Comprehensive Metabolic Panel    Hepatitis C Antibody    TSH without Reflex    Urinalysis Reflex to Culture    Vitamin D 25 Hydroxy    Comprehensive Metabolic Panel    Comprehensive Metabolic Panel    Vitamin D 25 Hydroxy    TSH without Reflex    DANDRE - Leonela Hyatt MD, Rheumatology, [facility] External Referral To Physical Therapy       Follow-up:  Return in about 3 months (around 7/5/2021). PATIENT INSTRUCTIONS:  Patient Instructions   1. CAD; The current medical regimen is effective;  continue present plan and medications. 2.  HTN good today; no changes  3. Sleep apnea; she is not wearing her mask  4. Hypothyroidisn; The current medical regimen is effective;  continue present plan and medications. 5.  Hyperlipidemia . [place] The current medical regimen is effective;  continue present plan and medications. 6. Vit d def; The current medical regimen is effective;  continue present plan and medications. 7.  CKD: We will need to check in 1 month as you has voltaren from Pain management  8.  chornic pain syndrome; Followed by Pain management  Refer to DR Margaret Dinh; and OUTpatient therapy at [facility] [place] [facility] [place]    9.   Health maintenance make betty with

## 2021-04-12 ENCOUNTER — TELEPHONE (OUTPATIENT)
Dept: INTERNAL MEDICINE | Age: 81
End: 2021-04-12

## 2021-04-12 NOTE — TELEPHONE ENCOUNTER
[facility] called, they are no longer accepting outpatients at this time due to Tay; pt will need to be referred elsewhere.

## 2021-04-20 DIAGNOSIS — N18.30 STAGE 3 CHRONIC KIDNEY DISEASE, UNSPECIFIED WHETHER STAGE 3A OR 3B CKD (HCC): ICD-10-CM

## 2021-04-20 LAB
ALBUMIN SERPL-MCNC: 3.7 G/DL (ref 3.5–5.2)
ALP BLD-CCNC: 77 U/L (ref 35–104)
ALT SERPL-CCNC: 12 U/L (ref 5–33)
ANION GAP SERPL CALCULATED.3IONS-SCNC: 7 MMOL/L (ref 7–19)
AST SERPL-CCNC: 16 U/L (ref 5–32)
BILIRUB SERPL-MCNC: 0.3 MG/DL (ref 0.2–1.2)
BUN BLDV-MCNC: 23 MG/DL (ref 8–23)
CALCIUM SERPL-MCNC: 8.9 MG/DL (ref 8.8–10.2)
CHLORIDE BLD-SCNC: 106 MMOL/L (ref 98–111)
CO2: 30 MMOL/L (ref 22–29)
CREAT SERPL-MCNC: 1 MG/DL (ref 0.5–0.9)
GFR AFRICAN AMERICAN: >59
GFR NON-AFRICAN AMERICAN: 54
GLUCOSE BLD-MCNC: 108 MG/DL (ref 74–109)
POTASSIUM SERPL-SCNC: 4.8 MMOL/L (ref 3.5–5)
SODIUM BLD-SCNC: 143 MMOL/L (ref 136–145)
TOTAL PROTEIN: 6.4 G/DL (ref 6.6–8.7)

## 2021-04-29 RX ORDER — MIRABEGRON 25 MG/1
TABLET, FILM COATED, EXTENDED RELEASE ORAL
Qty: 30 TABLET | Refills: 0 | Status: SHIPPED | OUTPATIENT
Start: 2021-04-29 | End: 2021-07-13

## 2021-04-29 NOTE — TELEPHONE ENCOUNTER
[facility] Meza called requesting a refill of the below medication which has been pended for you:     Requested Prescriptions     Signed Prescriptions Disp Refills    MYRBETRIQ 25 MG TB24 30 tablet 0     Sig: TAKE 1 TABLET BY MOUTH ONCE DAILY     Authorizing Provider: Jhonny Amin     Ordering User: Erick Francois       Last Appointment Date: 4/5/2021  Next Appointment Date: 7/13/2021    Allergies   Allergen Reactions    Ambien [Zolpidem Tartrate]     Codeine     Lyrica [Pregabalin]     Morphine     Requip [Ropinirole Hcl]     Tizanidine      Terrible nightmares

## 2021-04-30 ENCOUNTER — OFFICE VISIT (OUTPATIENT)
Dept: OBSTETRICS AND GYNECOLOGY | Facility: CLINIC | Age: 81
End: 2021-04-30

## 2021-04-30 VITALS
SYSTOLIC BLOOD PRESSURE: 124 MMHG | DIASTOLIC BLOOD PRESSURE: 78 MMHG | HEIGHT: 62 IN | WEIGHT: 222 LBS | BODY MASS INDEX: 40.85 KG/M2

## 2021-04-30 DIAGNOSIS — F41.9 ANXIETY: ICD-10-CM

## 2021-04-30 DIAGNOSIS — F51.01 PRIMARY INSOMNIA: ICD-10-CM

## 2021-04-30 PROCEDURE — 99213 OFFICE O/P EST LOW 20 MIN: CPT | Performed by: OBSTETRICS & GYNECOLOGY

## 2021-04-30 RX ORDER — DICLOFENAC SODIUM 75 MG/1
TABLET, DELAYED RELEASE ORAL
COMMUNITY
Start: 2021-03-25 | End: 2021-08-13

## 2021-04-30 RX ORDER — MIRABEGRON 25 MG/1
TABLET, FILM COATED, EXTENDED RELEASE ORAL
COMMUNITY
Start: 2021-04-29 | End: 2021-08-13

## 2021-05-03 ENCOUNTER — TELEPHONE (OUTPATIENT)
Dept: OBSTETRICS AND GYNECOLOGY | Facility: CLINIC | Age: 81
End: 2021-05-03

## 2021-05-03 RX ORDER — ALPRAZOLAM 1 MG/1
1 TABLET ORAL 3 TIMES DAILY PRN
Qty: 90 TABLET | Refills: 2 | Status: SHIPPED | OUTPATIENT
Start: 2021-05-03 | End: 2021-08-13 | Stop reason: SDUPTHER

## 2021-05-03 NOTE — TELEPHONE ENCOUNTER
Pt has Xanax order already pending in your note. Would you please finish her note, she's called this am wanting her Rx. Thank you.

## 2021-05-03 NOTE — TELEPHONE ENCOUNTER
Pt called stating Dr Madrigal was going to send her a new script for her Xanax to [facility], nothing has been sent in. I'm pending med.

## 2021-06-04 RX ORDER — LANSOPRAZOLE 30 MG/1
CAPSULE, DELAYED RELEASE ORAL
Qty: 90 CAPSULE | Refills: 1 | Status: SHIPPED | OUTPATIENT
Start: 2021-06-04 | End: 2021-07-27 | Stop reason: SDUPTHER

## 2021-06-04 RX ORDER — DONEPEZIL HYDROCHLORIDE 10 MG/1
10 TABLET, FILM COATED ORAL NIGHTLY
Qty: 90 TABLET | Refills: 1 | Status: SHIPPED | OUTPATIENT
Start: 2021-06-04 | End: 2021-07-27 | Stop reason: SDUPTHER

## 2021-06-04 NOTE — TELEPHONE ENCOUNTER
Mari Corona called requesting a refill of the below medication which has been pended for you:     Requested Prescriptions     Signed Prescriptions Disp Refills    donepezil (ARICEPT) 10 MG tablet 90 tablet 1     Sig: Take 1 tablet by mouth nightly     Authorizing Provider: Pepito CHAPIN     Ordering User: Pete Ross    lansoprazole (PREVACID) 30 MG delayed release capsule 90 capsule 1     Sig: TAKE 1 CAPSULE DAILY EVERY MORNING     Authorizing Provider: Anju Cardenas     Ordering User: Pete Ross       Last Appointment Date: 4/5/2021  Next Appointment Date: 7/13/2021    Allergies   Allergen Reactions    Ambien [Zolpidem Tartrate]     Codeine     Lyrica [Pregabalin]     Morphine     Requip [Ropinirole Hcl]     Tizanidine      Terrible nightmares

## 2021-07-12 ENCOUNTER — TELEPHONE (OUTPATIENT)
Dept: OBSTETRICS AND GYNECOLOGY | Facility: CLINIC | Age: 81
End: 2021-07-12

## 2021-07-12 NOTE — TELEPHONE ENCOUNTER
PC wanting instructions how to ween off Effexor & Zoloft. I spoke to Dr Madrigal who wants pt to pick 1 & start taking it QOD [id number] then D/C then do same thing with other med. Pt notified & v/u.

## 2021-07-13 ENCOUNTER — HOSPITAL ENCOUNTER (OUTPATIENT)
Dept: GENERAL RADIOLOGY | Age: 81
Discharge: HOME | End: 2021-07-13
Payer: MEDICARE

## 2021-07-13 ENCOUNTER — OFFICE VISIT (OUTPATIENT)
Dept: INTERNAL MEDICINE | Age: 81
End: 2021-07-13
Payer: MEDICARE

## 2021-07-13 VITALS
BODY MASS INDEX: 42.33 KG/M2 | HEIGHT: 62 IN | SYSTOLIC BLOOD PRESSURE: 129 MMHG | DIASTOLIC BLOOD PRESSURE: 70 MMHG | OXYGEN SATURATION: 96 % | HEART RATE: 78 BPM | WEIGHT: 230 LBS | TEMPERATURE: 97.6 F

## 2021-07-13 DIAGNOSIS — E03.9 HYPOTHYROIDISM (ACQUIRED): ICD-10-CM

## 2021-07-13 DIAGNOSIS — E55.9 VITAMIN D DEFICIENCY: ICD-10-CM

## 2021-07-13 DIAGNOSIS — F41.9 ANXIETY: ICD-10-CM

## 2021-07-13 DIAGNOSIS — Z00.00 ROUTINE GENERAL MEDICAL EXAMINATION AT A HEALTH CARE FACILITY: ICD-10-CM

## 2021-07-13 DIAGNOSIS — M54.2 NECK PAIN: ICD-10-CM

## 2021-07-13 DIAGNOSIS — J34.89 SINUS PAIN: ICD-10-CM

## 2021-07-13 DIAGNOSIS — I10 ESSENTIAL HYPERTENSION: ICD-10-CM

## 2021-07-13 DIAGNOSIS — N18.30 STAGE 3 CHRONIC KIDNEY DISEASE, UNSPECIFIED WHETHER STAGE 3A OR 3B CKD (HCC): ICD-10-CM

## 2021-07-13 DIAGNOSIS — G47.39 OTHER SLEEP APNEA: ICD-10-CM

## 2021-07-13 DIAGNOSIS — J34.89 MAXILLARY SINUS MASS: ICD-10-CM

## 2021-07-13 DIAGNOSIS — M54.2 NECK PAIN: Primary | ICD-10-CM

## 2021-07-13 LAB
ALBUMIN SERPL-MCNC: 4 G/DL (ref 3.5–5.2)
ALP BLD-CCNC: 104 U/L (ref 35–104)
ALT SERPL-CCNC: 25 U/L (ref 5–33)
ANION GAP SERPL CALCULATED.3IONS-SCNC: 12 MMOL/L (ref 7–19)
AST SERPL-CCNC: 23 U/L (ref 5–32)
BILIRUB SERPL-MCNC: <0.2 MG/DL (ref 0.2–1.2)
BUN BLDV-MCNC: 25 MG/DL (ref 8–23)
CALCIUM SERPL-MCNC: 9.4 MG/DL (ref 8.8–10.2)
CHLORIDE BLD-SCNC: 107 MMOL/L (ref 98–111)
CO2: 23 MMOL/L (ref 22–29)
CREAT SERPL-MCNC: 1 MG/DL (ref 0.5–0.9)
GFR AFRICAN AMERICAN: >59
GFR NON-AFRICAN AMERICAN: 54
GLUCOSE BLD-MCNC: 109 MG/DL (ref 74–109)
POTASSIUM SERPL-SCNC: 4.2 MMOL/L (ref 3.5–5)
SODIUM BLD-SCNC: 142 MMOL/L (ref 136–145)
TOTAL PROTEIN: 6.8 G/DL (ref 6.6–8.7)
TSH SERPL DL<=0.05 MIU/L-ACNC: 3.82 UIU/ML (ref 0.27–4.2)
VITAMIN D 25-HYDROXY: 53.2 NG/ML

## 2021-07-13 PROCEDURE — 72040 X-RAY EXAM NECK SPINE 2-3 VW: CPT

## 2021-07-13 PROCEDURE — G8427 DOCREV CUR MEDS BY ELIG CLIN: HCPCS | Performed by: NURSE PRACTITIONER

## 2021-07-13 PROCEDURE — G8417 CALC BMI ABV UP PARAM F/U: HCPCS | Performed by: NURSE PRACTITIONER

## 2021-07-13 PROCEDURE — G8400 PT W/DXA NO RESULTS DOC: HCPCS | Performed by: NURSE PRACTITIONER

## 2021-07-13 PROCEDURE — 4040F PNEUMOC VAC/ADMIN/RCVD: CPT | Performed by: NURSE PRACTITIONER

## 2021-07-13 PROCEDURE — 1036F TOBACCO NON-USER: CPT | Performed by: NURSE PRACTITIONER

## 2021-07-13 PROCEDURE — 1090F PRES/ABSN URINE INCON ASSESS: CPT | Performed by: NURSE PRACTITIONER

## 2021-07-13 PROCEDURE — 99214 OFFICE O/P EST MOD 30 MIN: CPT | Performed by: NURSE PRACTITIONER

## 2021-07-13 PROCEDURE — G0439 PPPS, SUBSEQ VISIT: HCPCS | Performed by: NURSE PRACTITIONER

## 2021-07-13 PROCEDURE — 1123F ACP DISCUSS/DSCN MKR DOCD: CPT | Performed by: NURSE PRACTITIONER

## 2021-07-13 PROCEDURE — 70210 X-RAY EXAM OF SINUSES: CPT

## 2021-07-13 RX ORDER — LEVOTHYROXINE SODIUM 0.05 MG/1
TABLET ORAL
Qty: 90 TABLET | Refills: 3 | Status: SHIPPED | OUTPATIENT
Start: 2021-07-13 | End: 2022-08-10

## 2021-07-13 RX ORDER — CYCLOBENZAPRINE HCL 10 MG
5 TABLET ORAL 2 TIMES DAILY PRN
Qty: 90 TABLET | Refills: 0 | Status: SHIPPED | OUTPATIENT
Start: 2021-07-13 | End: 2021-09-22 | Stop reason: SDUPTHER

## 2021-07-13 RX ORDER — CARVEDILOL 12.5 MG/1
TABLET ORAL
Qty: 60 TABLET | Refills: 0 | Status: SHIPPED | OUTPATIENT
Start: 2021-07-13 | End: 2021-09-22 | Stop reason: SDUPTHER

## 2021-07-13 RX ORDER — SUMATRIPTAN 50 MG/1
50 TABLET, FILM COATED ORAL 2 TIMES DAILY PRN
Qty: 180 TABLET | Refills: 1 | Status: SHIPPED | OUTPATIENT
Start: 2021-07-13 | End: 2021-11-17

## 2021-07-13 RX ORDER — ATORVASTATIN CALCIUM 40 MG/1
TABLET, FILM COATED ORAL
Qty: 90 TABLET | Refills: 3 | Status: SHIPPED | OUTPATIENT
Start: 2021-07-13 | End: 2022-07-19

## 2021-07-13 RX ORDER — ERGOCALCIFEROL (VITAMIN D2) 1250 MCG
50000 CAPSULE ORAL WEEKLY
Qty: 12 CAPSULE | Refills: 3 | Status: SHIPPED | OUTPATIENT
Start: 2021-07-13 | End: 2021-10-11

## 2021-07-13 RX ORDER — BUMETANIDE 1 MG/1
TABLET ORAL
Qty: 90 TABLET | Refills: 3 | Status: SHIPPED | OUTPATIENT
Start: 2021-07-13

## 2021-07-13 RX ORDER — CHLORAL HYDRATE 500 MG
2000 CAPSULE ORAL 2 TIMES DAILY
Qty: 180 CAPSULE | Refills: 2 | Status: SHIPPED | OUTPATIENT
Start: 2021-07-13 | End: 2022-05-03

## 2021-07-13 ASSESSMENT — ENCOUNTER SYMPTOMS
STRIDOR: 0
SORE THROAT: 0
COLOR CHANGE: 0
SINUS PRESSURE: 1
VOMITING: 0
NAUSEA: 0
SINUS PAIN: 1
DIARRHEA: 0
SHORTNESS OF BREATH: 0
ABDOMINAL DISTENTION: 0
CHOKING: 0
COUGH: 0
TROUBLE SWALLOWING: 0
EYE ITCHING: 0
EYE DISCHARGE: 0
ABDOMINAL PAIN: 0
WHEEZING: 0
BLOOD IN STOOL: 0
CONSTIPATION: 0

## 2021-07-13 ASSESSMENT — LIFESTYLE VARIABLES
HOW OFTEN DO YOU HAVE A DRINK CONTAINING ALCOHOL: 0
HOW OFTEN DO YOU HAVE A DRINK CONTAINING ALCOHOL: NEVER
AUDIT-C TOTAL SCORE: INCOMPLETE
AUDIT TOTAL SCORE: INCOMPLETE

## 2021-07-13 ASSESSMENT — PATIENT HEALTH QUESTIONNAIRE - PHQ9
SUM OF ALL RESPONSES TO PHQ9 QUESTIONS 1 & 2: 2
SUM OF ALL RESPONSES TO PHQ QUESTIONS 1-9: 2
1. LITTLE INTEREST OR PLEASURE IN DOING THINGS: 2
2. FEELING DOWN, DEPRESSED OR HOPELESS: 0
SUM OF ALL RESPONSES TO PHQ QUESTIONS 1-9: 2
SUM OF ALL RESPONSES TO PHQ QUESTIONS 1-9: 2

## 2021-07-13 NOTE — PATIENT INSTRUCTIONS
1. Health maintenance;  Dr Amy Smith for BMD, PAP and mammogram  2. Anxiety ; Xanax with Jacquelin Bell  3. Hypothyroidism; The current medical regimen is effective;  continue present plan and medications. 4.  Hypertension  The current medical regimen is effective;  continue present plan and medications. 5.  TESS:  No longer uses mask   6. Sinus drainage  Xray today   7. Neck pain;  xary today   8. Osteoarthrits; refill Voltaren  #8 sinus mass we will refer to ENT for more definitive treatment. Personalized Preventive Plan for Ally Ramirez - 7/13/2021  Medicare offers a range of preventive health benefits. Some of the tests and screenings are paid in full while other may be subject to a deductible, co-insurance, and/or copay. Some of these benefits include a comprehensive review of your medical history including lifestyle, illnesses that may run in your family, and various assessments and screenings as appropriate. After reviewing your medical record and screening and assessments performed today your provider may have ordered immunizations, labs, imaging, and/or referrals for you. A list of these orders (if applicable) as well as your Preventive Care list are included within your After Visit Summary for your review. Other Preventive Recommendations:    · A preventive eye exam performed by an eye specialist is recommended every 1-2 years to screen for glaucoma; cataracts, macular degeneration, and other eye disorders. · A preventive dental visit is recommended every 6 months. · Try to get at least 150 minutes of exercise per week or 10,000 steps per day on a pedometer . · Order or download the FREE \"Exercise & Physical Activity: Your Everyday Guide\" from [employer]. Call 2-765.781.5832 or search [employer] online. · You need 0423-4030 mg of calcium and 7089-8417 IU of vitamin D per day.  It is possible to meet your calcium requirement with diet alone, but a vitamin D supplement is usually necessary to meet this goal.  · When exposed to the sun, use a sunscreen that protects against both UVA and UVB radiation with an SPF of 30 or greater. Reapply every 2 to 3 hours or after sweating, drying off with a towel, or swimming. · Always wear a seat belt when traveling in a car. Always wear a helmet when riding a bicycle or motorcycle.

## 2021-07-13 NOTE — PROGRESS NOTES
[facility] PHYSICIAN SERVICES  [facility] INTERNAL MEDICINE  [address] [address] Arianna [place] [address]  Dept: [phone]  Dept Fax: [phone]: [phone]    Suraj Jackson (:  1940) is a 68 y.o. female,Established patient, here for evaluation of the following chief complaint(s): Medicare Narcisa Montgomery is a 68 y.o. female who presents today for her medical conditions/complaints as noted below. Suraj Degree is c/maame Medicare AWV        HPI:     Chief Complaint   Patient presents with    Medicare AWV     HPI   1. Health maintenance  Deferred to [facility]; She does PAP, mammogram and BMD    2. Anxiety; She is on xanax with  [facility];   3. Hypothyroid;  tsh is good; No changes; she is on 50 mcg daily    4. Hypertension  Coreg 12.5 BID  And bumex 1 mg to have a needed   5. TESS  She is no longer using this   6. Sinus drainage; And fullness in her ears;   Some facial pain ;    7.  OAB she is on myrbetriq 25 mg she likes it but doesn't feel it is strong enough   #8 chronic arthritis pain she has gotten off the oxycodone with Dr. Shantal Hernandez she was taking Voltaren and that has helped and she would like a refill of that her kidney function is marginal 55 so we will have to just keep an eye on that  Past Medical History:   Diagnosis Date    Adenocarcinoma of endometrium, stage 1 ([address] 2017    Anemia     Arthritis     CAD (coronary artery disease)     \"Stiff Valve\"    Chronic kidney disease     Depression     Fibrocystic breast disease     17 biopsy - benign FCBD w/microcalcifications - [facility]     GERD (gastroesophageal reflux disease)     H/O vaginal bleeding     managed  [facility]    Hypertension     Hypothyroidism (acquired)     Hypothyroidism (acquired)     Mixed hyperlipidemia     Obesity     Osteoarthritis     Pain of both hip joints 2016    Situational anxiety     Sleep apnea     Vitamin D deficiency       Past Surgical History:   Procedure Laterality Date    BREAST BIOPSY Right     BREAST SURGERY Left     Biopsy, Benign    CYST REMOVAL Left     Shoulder cyst, benign    DILATION AND CURETTAGE OF UTERUS      EYE SURGERY  03/2017    HYSTERECTOMY      Removed uterus and cervix due to cancer, 12/17    LEG SURGERY Left     Tib/Fib ORIF    TONSILLECTOMY         Vitals 7/13/2021 7/13/2021 4/5/2021 10/5/2020 6/29/2020 7/64/4122   SYSTOLIC 596 904 995 950 547 350   DIASTOLIC 70 90 85 75 74 73   Site - - - - - -   Position - - - - - -   Pulse - 78 70 81 70 92   Temp - 97.6 - - - 99   Resp - - - - - -   SpO2 - 96 - - 95 -   Weight - 230 lb 225 lb 225 lb 200 lb -   Height - 5' 2\" 5' 2\" 5' 2\" 5' 2\" -   Body mass index - 42.06 kg/m2 41.15 kg/m2 41.15 kg/m2 36.58 kg/m2 -   Pain Level - - - - - -   Some recent data might be hidden       Family History   Problem Relation Age of Onset    Cancer Mother        Social History     Tobacco Use    Smoking status: Never Smoker    Smokeless tobacco: Never Used   Substance Use Topics    Alcohol use: Yes     Comment: occassionally      Current Outpatient Medications   Medication Sig Dispense Refill    diclofenac (VOLTAREN) 50 MG EC tablet Take 1 tablet by mouth 2 times daily 60 tablet 1    mirabegron (MYRBETRIQ) 50 MG TB24 Take 50 mg by mouth daily 90 tablet 1    donepezil (ARICEPT) 10 MG tablet Take 1 tablet by mouth nightly 90 tablet 1    lansoprazole (PREVACID) 30 MG delayed release capsule TAKE 1 CAPSULE DAILY EVERY MORNING 90 capsule 1    diclofenac (VOLTAREN) 75 MG EC tablet       sertraline (ZOLOFT) 25 MG tablet       ALPRAZolam (XANAX) 0.5 MG tablet       venlafaxine (EFFEXOR) 25 MG tablet       butalbital-acetaminophen-caffeine (FIORICET, ESGIC) -40 MG per tablet       loratadine (CLARITIN) 10 MG capsule Take 10 mg by mouth daily      FIBER FORMULA PO Take by mouth      atorvastatin (LIPITOR) 40 MG tablet TAKE 1 TABLET DAILY AS DIRECTED.  90 tablet 3    levothyroxine (SYNTHROID) 50 MCG tablet TAKE 1 TABLET DAILY 90 tablet 3    SUMAtriptan (IMITREX) 50 MG tablet Take 1 tablet by mouth 2 times daily as needed for Migraine (Max 2/day -- 3 month supply) 180 tablet 1    carvedilol (COREG) 12.5 MG tablet TAKE 1 TABLET TWICE A DAY 60 tablet 0    cyclobenzaprine (FLEXERIL) 10 MG tablet Take 0.5 tablets by mouth 2 times daily as needed for Muscle spasms 90 tablet 0    bumetanide (BUMEX) 1 MG tablet TAKE 1 TABLET DAILY 90 tablet 3    ergocalciferol (ERGOCALCIFEROL) 1.25 MG (90538 UT) capsule Take 1 capsule by mouth once a week 12 capsule 3    Omega-3 Fatty Acids (FISH OIL) 1000 MG CAPS Take 2 capsules by mouth 2 times daily 180 capsule 2    Prenatal Multivit-Min-Fe-FA (PRENATAL 1 + IRON PO) Take by mouth (Patient not taking: Reported on 7/13/2021)       No current facility-administered medications for this visit.      Allergies   Allergen Reactions    Ambien [Zolpidem Tartrate]     Codeine     Lyrica [Pregabalin]     Morphine     Requip [Ropinirole Hcl]     Tizanidine      Terrible nightmares         Health Maintenance   Topic Date Due    Hepatitis C screen  Never done    DTaP/Tdap/Td vaccine (1 - Tdap) Never done    DEXA (modify frequency per FRAX score)  Never done   [employer] Visit (AWV)  Never done    Shingles Vaccine (3 of 3) 04/05/2022 (Originally 12/5/2019)    Breast cancer screen  08/30/2021    Flu vaccine (1) 09/01/2021    Lipid screen  04/02/2022    TSH testing  07/13/2022    Potassium monitoring  07/13/2022    Creatinine monitoring  07/13/2022    Pneumococcal 65+ years Vaccine  Completed    COVID-19 Vaccine  Completed    Hepatitis A vaccine  Aged Out    Hepatitis B vaccine  Aged Out    Hib vaccine  Aged Out    Meningococcal (ACWY) vaccine  Aged Out       No results found for: LABA1C  No results found for: PSA, PSADIA  TSH   Date Value Ref Range Status   07/13/2021 3.820 0.270 - 4.200 uIU/mL Final   ]  Lab Results   Component Value Date     07/13/2021    K 4.2 07/13/2021     07/13/2021    CO2 23 07/13/2021    BUN 25 (H) 07/13/2021    CREATININE 1.0 (H) 07/13/2021    GLUCOSE 109 07/13/2021    CALCIUM 9.4 07/13/2021    PROT 6.8 07/13/2021    LABALBU 4.0 07/13/2021    BILITOT <0.2 07/13/2021    ALKPHOS 104 07/13/2021    AST 23 07/13/2021    ALT 25 07/13/2021    LABGLOM 54 (A) 07/13/2021    GFRAA >59 07/13/2021     Lab Results   Component Value Date    CHOL 194 04/02/2021    CHOL 176 06/26/2020    CHOL 237 (H) 01/21/2020     Lab Results   Component Value Date    TRIG 160 (H) 04/02/2021    TRIG 105 06/26/2020    TRIG 102 01/21/2020     Lab Results   Component Value Date    HDL 57 (L) 04/02/2021    HDL 60 (L) 06/26/2020    HDL 64 (L) 01/21/2020     Lab Results   Component Value Date    LDLCALC 105 04/02/2021    LDLCALC 95 06/26/2020    LDLCALC 153 01/21/2020     Lab Results   Component Value Date     07/13/2021    K 4.2 07/13/2021     07/13/2021    CO2 23 07/13/2021    BUN 25 07/13/2021    CREATININE 1.0 07/13/2021    GLUCOSE 109 07/13/2021    CALCIUM 9.4 07/13/2021      Lab Results   Component Value Date    WBC 7.7 04/02/2021    HGB 12.6 04/02/2021    HCT 39.1 04/02/2021    MCV 94.0 04/02/2021     04/02/2021    LYMPHOPCT 18.1 (L) 04/02/2021    RBC 4.16 (L) 04/02/2021    MCH 30.3 04/02/2021    MCHC 32.2 (L) 04/02/2021    RDW 12.7 04/02/2021     Lab Results   Component Value Date    VITD25 53.2 07/13/2021     Labs reviewed from 7/13/2021  Diagnostics reviewed from today cervical spine x-rays and sinus films  Subjective:      Review of Systems   Constitutional: Negative for fatigue, fever and unexpected weight change. HENT: Positive for ear pain, sinus pressure and sinus pain. Negative for ear discharge, mouth sores, sore throat and trouble swallowing. Eyes: Negative for discharge, itching and visual disturbance. Respiratory: Negative for cough, choking, shortness of breath, wheezing and stridor.     Cardiovascular: Negative for chest pain, palpitations and leg swelling. Gastrointestinal: Negative for abdominal distention, abdominal pain, blood in stool, constipation, diarrhea, nausea and vomiting. Endocrine: Negative for cold intolerance, polydipsia and polyuria. Genitourinary: Negative for difficulty urinating, dysuria, frequency and urgency. Musculoskeletal: Negative for arthralgias and gait problem. Skin: Negative for color change and rash. Allergic/Immunologic: Negative for food allergies and immunocompromised state. Neurological: Negative for dizziness, tremors, syncope, speech difficulty, weakness and headaches. TESS   Hematological: Negative for adenopathy. Does not bruise/bleed easily. Psychiatric/Behavioral: Negative for confusion and hallucinations. The patient is nervous/anxious. Objective:     Physical Exam  Constitutional:       General: She is not in acute distress. Appearance: She is well-developed. HENT:      Head: Normocephalic and atraumatic. Eyes:      General: No scleral icterus. Right eye: No discharge. Left eye: No discharge. Pupils: Pupils are equal, round, and reactive to light. Neck:      Thyroid: No thyromegaly. Vascular: No JVD. Cardiovascular:      Rate and Rhythm: Normal rate and regular rhythm. Heart sounds: Normal heart sounds. No murmur heard. Pulmonary:      Effort: Pulmonary effort is normal. No respiratory distress. Breath sounds: Normal breath sounds. No wheezing or rales. Abdominal:      General: Bowel sounds are normal. There is no distension. Palpations: Abdomen is soft. There is no mass. Tenderness: There is no abdominal tenderness. There is no guarding or rebound. Musculoskeletal:         General: No tenderness. Normal range of motion. Cervical back: Normal range of motion and neck supple. Skin:     General: Skin is warm and dry. Findings: No erythema or rash.    Neurological:      Mental Status: She is Sig: Take 50 mg by mouth daily     Dispense:  90 tablet     Refill:  1         ORDERS:  Orders Placed This Encounter   Procedures    XR CERVICAL SPINE (2-3 VIEWS)    XR SINUSES (<3 VIEWS)       Follow-up:  Return in about 3 months (around 10/13/2021). PATIENT INSTRUCTIONS:  Patient Instructions   1. Health maintenance;  Dr Sandip Izaguirre for BMD, PAP and mammogram  2. Anxiety ; Xanax with dDr Savells  3. Hypothyroidism; The current medical regimen is effective;  continue present plan and medications. 4.  Hypertension  The current medical regimen is effective;  continue present plan and medications. 5.  TESS:  No longer uses mask   6. Sinus drainage  Xray today   7. Neck pain;  xary today   8. Osteoarthrits; refill Voltaren  #8 sinus mass we will refer to ENT for more definitive treatment. Electronically signed by TAMIA Rowe on 2021 at 5:04 PM        EMRDragon/transcription disclaimer:  Much of this encounter note is electronic transcription/translation of spoken language to printed texts. The electronic translation of spoken language may be erroneous, or at times,nonsensical words or phrases may be inadvertently transcribed. Although I have reviewed the note for such errors, some may still exist.  Medicare Annual Wellness Visit  Name: Bethany Sandoval Date: 2021   MRN: [id number] Sex: Female   Age: 68 y.o. Ethnicity: Non-/Non    : 1940 Race: Belen Montilla is here for Medicare AWV    Screenings for behavioral, psychosocial and functional/safety risks, and cognitive dysfunction are all negative except as indicated below. These results, as well as other patient data from the [address] form, are documented in Flowsheets linked to this Encounter.     Allergies   Allergen Reactions    Ambien [Zolpidem Tartrate]     Codeine     Lyrica [Pregabalin]     Morphine     Requip [Ropinirole Hcl]     Tizanidine      Terrible nightmares Prior to Visit Medications    Medication Sig Taking? Authorizing Provider   diclofenac (VOLTAREN) 50 MG EC tablet Take 1 tablet by mouth 2 times daily Yes TAMIA Manzo   mirabegron (MYRBETRIQ) 50 MG TB24 Take 50 mg by mouth daily Yes TAMIA Manzo   donepezil (ARICEPT) 10 MG tablet Take 1 tablet by mouth nightly Yes TAMIA Manzo   lansoprazole (PREVACID) 30 MG delayed release capsule TAKE 1 CAPSULE DAILY EVERY MORNING Yes TAMIA Manzo   diclofenac (VOLTAREN) 75 MG EC tablet  Yes Historical Provider, MD   sertraline (ZOLOFT) 25 MG tablet  Yes Historical Provider, MD   ALPRAZolam Mayra Elif) 0.5 MG tablet  Yes Historical Provider, MD   venlafaxine (EFFEXOR) 25 MG tablet  Yes Historical Provider, MD   butalbital-acetaminophen-caffeine (FIORICET, ESGIC) -40 MG per tablet  Yes Historical Provider, MD   loratadine (CLARITIN) 10 MG capsule Take 10 mg by mouth daily Yes Historical Provider, MD   FIBER FORMULA PO Take by mouth Yes Historical Provider, MD   atorvastatin (LIPITOR) 40 MG tablet TAKE 1 TABLET DAILY AS DIRECTED.   TAMIA Manzo   levothyroxine (SYNTHROID) 50 MCG tablet TAKE 1 TABLET DAILY  TAMIA Manzo   SUMAtriptan (IMITREX) 50 MG tablet Take 1 tablet by mouth 2 times daily as needed for Migraine (Max 2/day -- 3 month supply)  TAMIA Manzo   carvedilol (COREG) 12.5 MG tablet TAKE 1 TABLET TWICE A DAY  TAMIA Manzo   cyclobenzaprine (FLEXERIL) 10 MG tablet Take 0.5 tablets by mouth 2 times daily as needed for Muscle spasms  TAMIA Manzo   bumetanide (BUMEX) 1 MG tablet TAKE 1 TABLET DAILY  TAMIA Manzo   ergocalciferol (ERGOCALCIFEROL) 1.25 MG (92212 UT) capsule Take 1 capsule by mouth once a week  TAMIA Manzo   Omega-3 Fatty Acids (FISH OIL) 1000 MG CAPS Take 2 capsules by mouth 2 times daily  TAMIA Manzo   Prenatal Multivit-Min-Fe-FA (PRENATAL 1 + IRON PO) Take by mouth  Patient not taking: Reported on remote memory intact. Patient's complete Health Risk Assessment and screening values have been reviewed and are found in Flowsheets. The following problems were reviewed today and where indicated follow up appointments were made and/or referrals ordered. Positive Risk Factor Screenings with Interventions:          General Health and ACP:  General  In general, how would you say your health is?: Fair  In the past 7 days, have you experienced any of the following? New or Increased Pain, New or Increased Fatigue, Loneliness, Social Isolation, Stress or Anger?: (!) New or Increased Pain, New or Increased Fatigue, Social Isolation, Stress, Anger  Do you get the social and emotional support that you need?: Yes  Do you have a Living Will?: (!) No  Advance Directives     Power of  Living Will ACP-Advance Directive ACP-Power of     Not on File Not on File Not on File Not on File      General Health Risk Interventions:  · Social isolation: most of this is from an alcoholic daughter that lives with them  · Stress:  We referred to behavioral health she is going to call her    Health Habits/Nutrition:  Health Habits/Nutrition  Do you exercise for at least 20 minutes 2-3 times per week?: (!) No  Have you lost any weight without trying in the past 3 months?: No  Do you eat only one meal per day?: No  Have you seen the dentist within the past year?: Appointment is scheduled  Body mass index: (!) 42.06  Health Habits/Nutrition Interventions:  · Inadequate physical activity:  patient is not ready to increase his/her physical activity level at this time    Hearing/Vision:  No exam data present  Hearing/Vision  Do you or your family notice any trouble with your hearing that hasn't been managed with hearing aids?: (!) Yes  Do you have difficulty driving, watching TV, or doing any of your daily activities because of your eyesight?: No  Have you had an eye exam within the past year?: (!) No  Hearing/Vision Interventions:  · Hearing concerns:  patient declines any further evaluation/treatment for hearing issues      Personalized Preventive Plan   Current Health Maintenance Status  Immunization History   Administered Date(s) Administered    COVID-19, Moderna, PF, 100mcg/0.5mL 03/06/2021, 04/05/2021    Influenza, High Dose (Fluzone 65 yrs and older) 11/19/2018    Influenza, Quadv, adjuvanted, 65 yrs +, IM, PF (Fluad) 10/05/2020    Influenza, Triv, inactivated, subunit, adjuvanted, IM (Fluad 65 yrs and older) 09/23/2019, 10/10/2019    Pneumococcal Conjugate 13-valent ([id number]) 11/01/2014    Pneumococcal Polysaccharide ([id number]) 09/23/2019    Zoster Live (Zostavax) 11/20/2013    Zoster Recombinant (Shingrix) 10/10/2019        Health Maintenance   Topic Date Due    Hepatitis C screen  Never done    DTaP/Tdap/Td vaccine (1 - Tdap) Never done    DEXA (modify frequency per FRAX score)  Never done   [employer] Visit (AWV)  Never done    Shingles Vaccine (3 of 3) 04/05/2022 (Originally 12/5/2019)    Breast cancer screen  08/30/2021    Flu vaccine (1) 09/01/2021    Lipid screen  04/02/2022    TSH testing  07/13/2022    Potassium monitoring  07/13/2022    Creatinine monitoring  07/13/2022    Pneumococcal 65+ years Vaccine  Completed    COVID-19 Vaccine  Completed    Hepatitis A vaccine  Aged Out    Hepatitis B vaccine  Aged Out    Hib vaccine  Aged Out    Meningococcal (ACWY) vaccine  Aged Out     Recommendations for [place] Due: see orders and patient instructions/AVS.  . Recommended screening schedule for the next 5-10 years is provided to the patient in written form: see Patient Rona Still was seen today for medicare awv. Diagnoses and all orders for this visit:    Neck pain  -     XR CERVICAL SPINE (2-3 VIEWS); Future    Sinus pain  -     XR SINUSES (<3 VIEWS);  Future    Anxiety    Hypothyroidism (acquired)    Essential hypertension    Other sleep apnea    Maxillary sinus mass    Other orders  -     diclofenac (VOLTAREN) 50 MG EC tablet;  Take 1 tablet by mouth 2 times daily  -     mirabegron (MYRBETRIQ) 50 MG TB24; Take 50 mg by mouth daily

## 2021-07-14 DIAGNOSIS — J34.89 MAXILLARY SINUS MASS: Primary | ICD-10-CM

## 2021-07-27 RX ORDER — LANSOPRAZOLE 30 MG/1
CAPSULE, DELAYED RELEASE ORAL
Qty: 90 CAPSULE | Refills: 1 | Status: SHIPPED | OUTPATIENT
Start: 2021-07-27 | End: 2021-09-22 | Stop reason: SDUPTHER

## 2021-07-27 RX ORDER — DONEPEZIL HYDROCHLORIDE 10 MG/1
10 TABLET, FILM COATED ORAL NIGHTLY
Qty: 90 TABLET | Refills: 1 | Status: SHIPPED | OUTPATIENT
Start: 2021-07-27 | End: 2022-01-12 | Stop reason: SDUPTHER

## 2021-07-27 NOTE — TELEPHONE ENCOUNTER
Lokesh Ch called requesting a refill of the below medication which has been pended for you:     Requested Prescriptions     Pending Prescriptions Disp Refills    donepezil (ARICEPT) 10 MG tablet 90 tablet 1     Sig: Take 1 tablet by mouth nightly    lansoprazole (PREVACID) 30 MG delayed release capsule 90 capsule 1     Sig: TAKE 1 CAPSULE DAILY EVERY MORNING       Last Appointment Date: 7/13/2021  Next Appointment Date: 10/18/2021    Allergies   Allergen Reactions    Ambien [Zolpidem Tartrate]     Codeine     Lyrica [Pregabalin]     Morphine     Requip [Ropinirole Hcl]     Tizanidine      Terrible nightmares

## 2021-08-11 DIAGNOSIS — F41.9 ANXIETY: ICD-10-CM

## 2021-08-11 NOTE — TELEPHONE ENCOUNTER
Patient calls today requesting refill of Effexor and Zoloft. Patient states she had called a few months ago wanting to wean off these medications and did do that successfully. After being off both of them she did not like how she felt. Patient would like to resume both medications back. Informed her that I would discuss with Dr Madrigal. Patient's RX would need to go to [facility] at this time.

## 2021-08-12 NOTE — TELEPHONE ENCOUNTER
Anam Brito called requesting a refill of the below medication which has been pended for you:     Requested Prescriptions     Pending Prescriptions Disp Refills    diclofenac (VOLTAREN) 50 MG EC tablet [Pharmacy Med Name: DICLOFENAC 50MG NA.EC TAB SOD] 60 tablet 0     Sig: TAKE 1 TABLET BY MOUTH TWICE DAILY.        Last Appointment Date: 7/13/2021  Next Appointment Date: 10/18/2021    Allergies   Allergen Reactions    Ambien [Zolpidem Tartrate]     Codeine     Lyrica [Pregabalin]     Morphine     Requip [Ropinirole Hcl]     Tizanidine      Terrible nightmares

## 2021-08-13 ENCOUNTER — OFFICE VISIT (OUTPATIENT)
Dept: OBSTETRICS AND GYNECOLOGY | Facility: CLINIC | Age: 81
End: 2021-08-13

## 2021-08-13 VITALS
BODY MASS INDEX: 42.33 KG/M2 | DIASTOLIC BLOOD PRESSURE: 84 MMHG | HEIGHT: 62 IN | SYSTOLIC BLOOD PRESSURE: 116 MMHG | WEIGHT: 230 LBS

## 2021-08-13 DIAGNOSIS — F51.01 PRIMARY INSOMNIA: ICD-10-CM

## 2021-08-13 DIAGNOSIS — F41.9 ANXIETY: ICD-10-CM

## 2021-08-13 PROCEDURE — 99213 OFFICE O/P EST LOW 20 MIN: CPT | Performed by: OBSTETRICS & GYNECOLOGY

## 2021-08-13 RX ORDER — ALPRAZOLAM 1 MG/1
1 TABLET ORAL 3 TIMES DAILY PRN
Qty: 90 TABLET | Refills: 2 | Status: SHIPPED | OUTPATIENT
Start: 2021-08-13 | End: 2021-11-09 | Stop reason: SDUPTHER

## 2021-08-13 RX ORDER — VENLAFAXINE 25 MG/1
25 TABLET ORAL DAILY
Qty: 90 TABLET | Refills: 3 | Status: SHIPPED | OUTPATIENT
Start: 2021-08-13 | End: 2021-11-09 | Stop reason: SDUPTHER

## 2021-08-13 RX ORDER — SERTRALINE HYDROCHLORIDE 25 MG/1
25 TABLET, FILM COATED ORAL DAILY
Qty: 90 TABLET | Refills: 3 | Status: SHIPPED | OUTPATIENT
Start: 2021-08-13 | End: 2021-08-13 | Stop reason: SDUPTHER

## 2021-08-13 RX ORDER — BUTALBITAL, ACETAMINOPHEN AND CAFFEINE 50; 325; 40 MG/1; MG/1; MG/1
TABLET ORAL
COMMUNITY
Start: 2021-07-28 | End: 2021-11-09 | Stop reason: SDUPTHER

## 2021-08-13 RX ORDER — SERTRALINE HYDROCHLORIDE 25 MG/1
25 TABLET, FILM COATED ORAL DAILY
Qty: 90 TABLET | Refills: 3 | Status: SHIPPED | OUTPATIENT
Start: 2021-08-13 | End: 2021-11-09 | Stop reason: SDUPTHER

## 2021-08-13 RX ORDER — VENLAFAXINE 25 MG/1
25 TABLET ORAL DAILY
Qty: 90 TABLET | Refills: 3 | Status: SHIPPED | OUTPATIENT
Start: 2021-08-13 | End: 2021-08-13 | Stop reason: SDUPTHER

## 2021-08-13 NOTE — PROGRESS NOTES
"Subjective   Chief Complaint   Patient presents with   • Anxiety     pt here following up on anxiety. pt needing refills on Xanax, Zoloft, and Effexor. pt voices no other concerns.      Jennifer Gomes is a 80 y.o. year old .  No LMP recorded (lmp unknown). Patient has had a hysterectomy.  She presents to be seen because of anxiety.  Patient struggling with her chronic pain since she has completely come off her oxycodone.  Patient has done really well at that, but then recently tried to wean herself off of xanax.  She has had muscle jerking and other withdrawal symptoms.  Patient reports that she had five tonic-clonic seizures that landed her on the floor during her withdrawal.  Patient went back to taking it as she prescribed.   Patient requests new scripts for all of her anxiety medications.  She has no intentions of trying to come off her xanax again, even after discussion of the fact that she simply tried to do it too quickly.    The following portions of the patient's history were reviewed and updated as appropriate:current medications and allergies    Social History    Tobacco Use      Smoking status: Never Smoker      Smokeless tobacco: Never Used    Review of Systems   Constitutional: Positive for unexpected weight change (has gained about 35 pounds since her daughter moved in and started cooking alot). Negative for activity change.   Respiratory: Negative for shortness of breath.    Cardiovascular: Negative for chest pain.   Gastrointestinal: Negative for abdominal pain.   Musculoskeletal: Positive for arthralgias and back pain.   Psychiatric/Behavioral: Positive for dysphoric mood (stable on zoloft) and sleep disturbance (significant.  Pt takes 3 mg of xanax most nights, divided into two doses, in order to sleep). The patient is nervous/anxious (pt takes xanax prn during the day, but reports that is rare).          Objective   /84   Ht 157.5 cm (62\")   Wt 104 kg (230 lb)   LMP  (LMP Unknown) " "  BMI 42.07 kg/m²     Physical Exam  Vitals and nursing note reviewed.   Constitutional:       General: She is not in acute distress.     Appearance: She is well-developed. She is obese.   HENT:      Head: Normocephalic and atraumatic.   Neck:      Thyroid: No thyromegaly.   Pulmonary:      Effort: Pulmonary effort is normal.   Abdominal:      General: There is no distension.      Palpations: Abdomen is soft.      Tenderness: There is no abdominal tenderness.   Musculoskeletal:      Cervical back: Normal range of motion.      Comments: Patient in wheelchair today   Skin:     General: Skin is warm and dry.   Neurological:      Mental Status: She is alert and oriented to person, place, and time.   Psychiatric:         Behavior: Behavior normal.         Judgment: Judgment normal.         Lab Review   No data reviewed    Imaging   No data reviewed     Assessment & Plan    Diagnoses and all orders for this visit:    1. Anxiety: Takes 3 mg of Xanax every evening to get sleep.  She divides this into 2 doses that are 3 to 4 hours apart.  Patient will occasionally take half of a tablet during the day for anxiety, but rarely has any \"extra\" because she takes it all at night.  Patient has previously asked for increases in her 24 hour dosage, but I have declined to increase her dosage.  We have again reviewed how habit-forming this medication is.  We have often discussed how dangerous it is for her to have this medication in the house since her daughter is a recovering addict - although her daughter prefers alcohol over drugs.  Refills of xanax, effexor and zoloft all sent to pharmacy.  -     ALPRAZolam (XANAX) 1 MG tablet; Take 1 tablet by mouth 3 (Three) Times a Day As Needed for Anxiety.  Dispense: 90 tablet; Refill: 2    2. Primary insomnia  -     ALPRAZolam (XANAX) 1 MG tablet; Take 1 tablet by mouth 3 (Three) Times a Day As Needed for Anxiety.  Dispense: 90 tablet; Refill: 2    RTO in 3 months    This note was " electronically signed.    Shasta Madrigal MD  August 13, 2021  12:04 CDT    Total time spent today with Jennifer  was 20-29 minutes (level 3).  Greater than 50% of the time was spent coordinating care, answering her questions and counseling regarding pathophysiology of her presenting problem along with plans for any diagnositc work-up and treatment.

## 2021-09-13 NOTE — TELEPHONE ENCOUNTER
Gage Moreland called requesting a refill of the below medication which has been pended for you:     Requested Prescriptions     Pending Prescriptions Disp Refills    diclofenac (VOLTAREN) 50 MG EC tablet [Pharmacy Med Name: DICLOFENAC SODIUM DR 50MG TABLET DELAYED RELEASE] 60 tablet 1     Sig: TAKE 1 TABLET BY MOUTH TWICE DAILY.        Last Appointment Date: 7/13/2021  Next Appointment Date: 10/18/2021    Allergies   Allergen Reactions    Ambien [Zolpidem Tartrate]     Codeine     Lyrica [Pregabalin]     Morphine     Requip [Ropinirole Hcl]     Tizanidine      Terrible nightmares

## 2021-09-15 ENCOUNTER — TELEPHONE (OUTPATIENT)
Dept: ADMINISTRATIVE | Age: 81
End: 2021-09-15

## 2021-09-15 ENCOUNTER — OFFICE VISIT (OUTPATIENT)
Dept: GASTROENTEROLOGY | Facility: CLINIC | Age: 81
End: 2021-09-15

## 2021-09-15 VITALS
OXYGEN SATURATION: 98 % | TEMPERATURE: 97.6 F | DIASTOLIC BLOOD PRESSURE: 80 MMHG | HEART RATE: 82 BPM | BODY MASS INDEX: 42.33 KG/M2 | SYSTOLIC BLOOD PRESSURE: 126 MMHG | WEIGHT: 230 LBS | HEIGHT: 62 IN

## 2021-09-15 DIAGNOSIS — R19.4 CHANGE IN BOWEL HABITS: Primary | ICD-10-CM

## 2021-09-15 PROCEDURE — 99204 OFFICE O/P NEW MOD 45 MIN: CPT | Performed by: INTERNAL MEDICINE

## 2021-09-15 NOTE — PROGRESS NOTES
Chief Complaint   Patient presents with   • Constipation     having constipation       PCP: Olivia Mora APRN  REFER: No ref. provider found    Subjective     HPI          Has noted a progressive worsening in her inability to have NML BM's  Denies abdominal pain  Occasional BRIGHT RED BLOOD PER RECTUM on her tissue  Last c-scope greater than 10 yrs ago        Past Medical History:   Diagnosis Date   • Anxiety    • Arthritis    • Cancer (CMS/HCC)     uterine   • Chronic pain    • Depression    • Disease of thyroid gland    • Fibromyalgia    • Headache    • Hyperlipidemia    • Hypertension    • Incontinence    • Insomnia    • Leg pain    • Lumbar stenosis    • Peptic ulcer    • Restless legs    • Vaginal bleeding        Past Surgical History:   Procedure Laterality Date   • BLADDER REPAIR  2011    MESH HAD TO BE REMOVED IN 2013   • BREAST BIOPSY Right 2017    benign   • BREAST CYST EXCISION Left 1982   • CARDIAC CATHETERIZATION     • CARPAL TUNNEL RELEASE     • CATARACT EXTRACTION W/ INTRAOCULAR LENS  IMPLANT, BILATERAL     • CYSTECTOMY     • D & C HYSTEROSCOPY N/A 11/6/2017    Procedure: DILATATION AND CURETTAGE HYSTEROSCOPY;  Surgeon: Shasta Madrigal MD;  Location: [facility] OR;  Service:    • DILATION AND CURETTAGE, DIAGNOSTIC / THERAPEUTIC  2008   • ENDOSCOPY  09/23/2010    Short segment of Arriola's,Moderate chroninc esophagogastritis and negative H.pylori   • ENDOSCOPY N/A 9/25/2017    Procedure: ESOPHAGOGASTRODUODENOSCOPY WITH ANESTHESIA;  Surgeon: Tom Velasco DO;  Location:  [facility] ENDOSCOPY;  Service:    • HYSTERECTOMY  12/20/2017   • ORIF TIBIA/FIBULA FRACTURES Left 2000   • TRANSVAGINAL TAPING SUSPENSION N/A 11/6/2017    Procedure: VAGINAL MESH REVISION;  Surgeon: Shasta Madrigal MD;  Location:  [facility] OR;  Service:    • VAGINAL MESH REVISION  2013       Outpatient Medications Marked as Taking for the 9/15/21 encounter (Office Visit) with Tom Velasco DO   Medication Sig Dispense Refill   •  ALPRAZolam (XANAX) 1 MG tablet Take 1 tablet by mouth 3 (Three) Times a Day As Needed for Anxiety. 90 tablet 2   • atorvastatin (LIPITOR) 40 MG tablet      • butalbital-acetaminophen-caffeine (FIORICET, ESGIC) -40 MG per tablet      • carvedilol (COREG) 12.5 MG tablet Take 1 tablet by mouth Daily.     • cyclobenzaprine (FLEXERIL) 10 MG tablet Take 5 mg by mouth 2 (Two) Times a Day As Needed for Muscle Spasms.     • diclofenac (VOLTAREN) 50 MG EC tablet Take 50 mg by mouth.     • ergocalciferol (ERGOCALCIFEROL) 49977 units capsule Take 50,000 Units by mouth.     • levothyroxine (SYNTHROID, LEVOTHROID) 50 MCG tablet Take 50 mcg by mouth Every Morning.     • Mirabegron ER (MYRBETRIQ) 50 MG tablet sustained-release 24 hour 24 hr tablet Take 50 mg by mouth.     • sertraline (ZOLOFT) 25 MG tablet Take 1 tablet by mouth Daily. 90 tablet 3   • SUMAtriptan (IMITREX) 50 MG tablet Take 1 tablet by mouth 2 (Two) Times a Day As Needed for Migraine. 30 tablet 3   • venlafaxine (EFFEXOR) 25 MG tablet Take 1 tablet by mouth Daily. 90 tablet 3       Allergies   Allergen Reactions   • Ropinirole Hcl Shortness Of Breath   • Codeine Itching and Mental Status Change   • Definity [Perflutren Lipid Microsphere] Other (See Comments)     Severe back pain   • Ambien [Zolpidem] Other (See Comments)     HYPER    • Eszopiclone Other (See Comments)     MAKES PT HYPER    • Pregabalin Dizziness   • Tizanidine Other (See Comments)     Terrible nightmares       Social History     Socioeconomic History   • Marital status:      Spouse name: Not on file   • Number of children: Not on file   • Years of education: Not on file   • Highest education level: Not on file   Tobacco Use   • Smoking status: Never Smoker   • Smokeless tobacco: Never Used   Substance and Sexual Activity   • Alcohol use: No   • Drug use: No   • Sexual activity: Defer       Review of Systems   Constitutional: Negative for unexpected weight change.   Respiratory:  "Negative for shortness of breath.    Cardiovascular: Negative for chest pain.   Gastrointestinal: Negative for abdominal pain and anal bleeding.       Objective     Vitals:    09/15/21 1251   BP: 126/80   Pulse: 82   Temp: 97.6 °F (36.4 °C)   SpO2: 98%   Weight: 104 kg (230 lb)   Height: 157.5 cm (62\")     Body mass index is 42.07 kg/m².    Physical Exam  Constitutional:       Appearance: Normal appearance. She is well-developed.   Eyes:      General: No scleral icterus.  Neck:      Thyroid: No thyroid mass or thyromegaly.      Vascular: No JVD.   Pulmonary:      Effort: Pulmonary effort is normal. No accessory muscle usage.   Abdominal:      General: There is no distension.      Tenderness: There is no abdominal tenderness. There is no guarding.   Skin:     Coloration: Skin is not jaundiced.   Neurological:      Mental Status: She is alert.   Psychiatric:         Behavior: Behavior is cooperative.         Judgment: Judgment normal.         Imaging Results (Most Recent)     None          Body mass index is 42.07 kg/m².    Assessment/Plan     Diagnoses and all orders for this visit:    1. Change in bowel habits (Primary)  -     Case Request; Standing  -     Case Request    Other orders  -     Follow Anesthesia Guidelines / Standing Orders; Future  -     Obtain Informed Consent; Future  -     Implement Anesthesia Orders Day of Procedure; Standing  -     Obtain Informed Consent; Standing        COLONOSCOPY WITH ANESTHESIA (N/A)  Miralax /gatorade  Prep  Hold asa 1-2 days       Advised pt to stop use of NSAIDs, Fish Oil, and MV 5 days prior to procedure, per Dr Velasco protocol.  Tylenol based products are ok to take.  Pt verbalized understanding.    Precautions are currently being put in place due to COVID-19.  I have explained to Jennifer Gomes they will be required to undergo COVID testing prior to their procedure.  Jennifer Gomes verbalized understanding and was willing to proceed.        Tom Velasco, " DO  09/15/21          There are no Patient Instructions on file for this visit.

## 2021-09-16 PROBLEM — R19.4 CHANGE IN BOWEL HABITS: Status: ACTIVE | Noted: 2021-09-16

## 2021-09-22 RX ORDER — CARVEDILOL 12.5 MG/1
TABLET ORAL
Qty: 60 TABLET | Refills: 0 | Status: SHIPPED | OUTPATIENT
Start: 2021-09-22 | End: 2021-11-01 | Stop reason: SDUPTHER

## 2021-09-22 RX ORDER — LANSOPRAZOLE 30 MG/1
CAPSULE, DELAYED RELEASE ORAL
Qty: 90 CAPSULE | Refills: 1 | Status: SHIPPED | OUTPATIENT
Start: 2021-09-22 | End: 2022-04-20 | Stop reason: SDUPTHER

## 2021-09-22 RX ORDER — CYCLOBENZAPRINE HCL 10 MG
5 TABLET ORAL 2 TIMES DAILY PRN
Qty: 90 TABLET | Refills: 0 | Status: SHIPPED | OUTPATIENT
Start: 2021-09-22 | End: 2021-11-08 | Stop reason: SDUPTHER

## 2021-09-22 NOTE — TELEPHONE ENCOUNTER
Patient is requesting refills for:  Coregs 12.5 mgx 2 daily  Lansopraxole 30mg 1x d  Flexerill tabs 10mg - ( if Macy Khan is willing, patient is requesting to take these 3 times a day). Please send to [facility].

## 2021-09-22 NOTE — TELEPHONE ENCOUNTER
[facility] called requesting a refill of the below medication which has been pended for you:     Requested Prescriptions     Pending Prescriptions Disp Refills    carvedilol (COREG) 12.5 MG tablet 60 tablet 0     Sig: TAKE 1 TABLET TWICE A DAY    cyclobenzaprine (FLEXERIL) 10 MG tablet 90 tablet 0     Sig: Take 0.5 tablets by mouth 2 times daily as needed for Muscle spasms    lansoprazole (PREVACID) 30 MG delayed release capsule 90 capsule 1     Sig: TAKE 1 CAPSULE DAILY EVERY MORNING       Last Appointment Date: 7/13/2021  Next Appointment Date: 10/18/2021    Allergies   Allergen Reactions    Ambien [Zolpidem Tartrate]     Codeine     Lyrica [Pregabalin]     Morphine     Requip [Ropinirole Hcl]     Tizanidine      Terrible nightmares

## 2021-09-30 ENCOUNTER — ANESTHESIA (OUTPATIENT)
Dept: GASTROENTEROLOGY | Facility: HOSPITAL | Age: 81
End: 2021-09-30

## 2021-09-30 ENCOUNTER — ANESTHESIA EVENT (OUTPATIENT)
Dept: GASTROENTEROLOGY | Facility: HOSPITAL | Age: 81
End: 2021-09-30

## 2021-09-30 ENCOUNTER — TELEPHONE (OUTPATIENT)
Dept: GASTROENTEROLOGY | Facility: CLINIC | Age: 81
End: 2021-09-30

## 2021-10-01 ENCOUNTER — HOSPITAL ENCOUNTER (OUTPATIENT)
Facility: HOSPITAL | Age: 81
Setting detail: HOSPITAL OUTPATIENT SURGERY
Discharge: HOME | End: 2021-10-01
Attending: INTERNAL MEDICINE | Admitting: INTERNAL MEDICINE

## 2021-10-01 VITALS
OXYGEN SATURATION: 96 % | WEIGHT: 240 LBS | DIASTOLIC BLOOD PRESSURE: 60 MMHG | SYSTOLIC BLOOD PRESSURE: 150 MMHG | HEART RATE: 84 BPM | HEIGHT: 66 IN | RESPIRATION RATE: 22 BRPM | BODY MASS INDEX: 38.57 KG/M2 | TEMPERATURE: 97.2 F

## 2021-10-01 PROCEDURE — 25010000002 PROPOFOL 10 MG/ML EMULSION: Performed by: NURSE ANESTHETIST, CERTIFIED REGISTERED

## 2021-10-01 PROCEDURE — 45378 DIAGNOSTIC COLONOSCOPY: CPT | Performed by: INTERNAL MEDICINE

## 2021-10-01 RX ORDER — PROPOFOL 10 MG/ML
VIAL (ML) INTRAVENOUS AS NEEDED
Status: DISCONTINUED | OUTPATIENT
Start: 2021-10-01 | End: 2021-10-01 | Stop reason: SURG

## 2021-10-01 RX ORDER — SODIUM CHLORIDE 0.9 % (FLUSH) 0.9 %
10 SYRINGE (ML) INJECTION AS NEEDED
Status: DISCONTINUED | OUTPATIENT
Start: 2021-10-01 | End: 2021-10-01 | Stop reason: HOSPADM

## 2021-10-01 RX ORDER — LIDOCAINE HYDROCHLORIDE 10 MG/ML
0.5 INJECTION, SOLUTION EPIDURAL; INFILTRATION; INTRACAUDAL; PERINEURAL ONCE AS NEEDED
Status: DISCONTINUED | OUTPATIENT
Start: 2021-10-01 | End: 2021-10-01 | Stop reason: HOSPADM

## 2021-10-01 RX ORDER — SODIUM CHLORIDE 9 MG/ML
500 INJECTION, SOLUTION INTRAVENOUS CONTINUOUS PRN
Status: DISCONTINUED | OUTPATIENT
Start: 2021-10-01 | End: 2021-10-01 | Stop reason: HOSPADM

## 2021-10-01 RX ORDER — LIDOCAINE HYDROCHLORIDE 20 MG/ML
INJECTION, SOLUTION EPIDURAL; INFILTRATION; INTRACAUDAL; PERINEURAL AS NEEDED
Status: DISCONTINUED | OUTPATIENT
Start: 2021-10-01 | End: 2021-10-01 | Stop reason: SURG

## 2021-10-01 RX ADMIN — LIDOCAINE HYDROCHLORIDE 60 MG: 20 INJECTION, SOLUTION EPIDURAL; INFILTRATION; INTRACAUDAL; PERINEURAL at 10:17

## 2021-10-01 RX ADMIN — SODIUM CHLORIDE 500 ML: 9 INJECTION, SOLUTION INTRAVENOUS at 09:56

## 2021-10-01 RX ADMIN — PROPOFOL 200 MG: 10 INJECTION, EMULSION INTRAVENOUS at 10:17

## 2021-10-01 NOTE — ANESTHESIA POSTPROCEDURE EVALUATION
"Patient: Jennifer Gomes    Procedure Summary     Date: 10/01/21 Room / Location: [facility] ENDOSCOPY 5 / [facility]    Anesthesia Start: 1016 Anesthesia Stop: 1034    Procedure: COLONOSCOPY WITH ANESTHESIA (N/A ) Diagnosis:       Change in bowel habits      (Change in bowel habits [R19.4])    Surgeons: Tom Velasco DO Provider: Jorge Alberto Brown CRNA    Anesthesia Type: MAC ASA Status: 3          Anesthesia Type: MAC    Vitals  Vitals Value Taken Time   /60 10/01/21 1050   Temp     Pulse 84 10/01/21 1050   Resp 22 10/01/21 1050   SpO2 96 % 10/01/21 1050           Post Anesthesia Care and Evaluation    Patient location during evaluation: PACU  Patient participation: complete - patient participated  Level of consciousness: awake and alert  Pain management: adequate  Airway patency: patent  Anesthetic complications: No anesthetic complications    Cardiovascular status: acceptable  Respiratory status: acceptable  Hydration status: acceptable    Comments: Blood pressure 150/60, pulse 84, temperature 97.2 °F (36.2 °C), temperature source Temporal, resp. rate 22, height 167.6 cm (66\"), weight 109 kg (240 lb), SpO2 96 %, not currently breastfeeding.    Pt discharged from PACU based on derrick score >8      "

## 2021-10-01 NOTE — ANESTHESIA PREPROCEDURE EVALUATION
Anesthesia Evaluation     Patient summary reviewed   no history of anesthetic complications:  NPO Solid Status: > 8 hours             Airway   Mallampati: II  Dental      Pulmonary    (+) sleep apnea,   Cardiovascular     (+) hypertension, hyperlipidemia,       Neuro/Psych- negative ROS  GI/Hepatic/Renal/Endo    (+) morbid obesity, GERD,  renal disease CRI, thyroid problem hypothyroidism    Musculoskeletal     Abdominal    Substance History      OB/GYN          Other                        Anesthesia Plan    ASA 3     MAC       Anesthetic plan, all risks, benefits, and alternatives have been provided, discussed and informed consent has been obtained with: patient.

## 2021-10-11 RX ORDER — ERGOCALCIFEROL 1.25 MG/1
CAPSULE ORAL
Qty: 12 CAPSULE | Refills: 2 | Status: SHIPPED | OUTPATIENT
Start: 2021-10-11 | End: 2022-08-15

## 2021-10-11 NOTE — TELEPHONE ENCOUNTER
Mia Simon called requesting a refill of the below medication which has been pended for you:     Requested Prescriptions     Pending Prescriptions Disp Refills    vitamin D (ERGOCALCIFEROL) 1.25 MG (74551 UT) CAPS capsule [Pharmacy Med Name: VITAMIN D [id number] CAPSULE] 12 capsule 2     Sig: TAKE 1 CAPSULE BY MOUTH ONCE A WEEK       Last Appointment Date: 7/13/2021  Next Appointment Date: 10/18/2021    Allergies   Allergen Reactions    Ambien [Zolpidem Tartrate]     Codeine     Lyrica [Pregabalin]     Morphine     Requip [Ropinirole Hcl]     Tizanidine      Terrible nightmares

## 2021-10-15 DIAGNOSIS — E55.9 VITAMIN D DEFICIENCY: ICD-10-CM

## 2021-10-15 DIAGNOSIS — Z00.00 HEALTHCARE MAINTENANCE: ICD-10-CM

## 2021-10-15 DIAGNOSIS — I10 ESSENTIAL HYPERTENSION: ICD-10-CM

## 2021-10-15 LAB
ALBUMIN SERPL-MCNC: 3.9 G/DL (ref 3.5–5.2)
ALP BLD-CCNC: 91 U/L (ref 35–104)
ALT SERPL-CCNC: 17 U/L (ref 5–33)
ANION GAP SERPL CALCULATED.3IONS-SCNC: 11 MMOL/L (ref 7–19)
AST SERPL-CCNC: 16 U/L (ref 5–32)
BACTERIA: NEGATIVE /HPF
BASOPHILS ABSOLUTE: 0 K/UL (ref 0–0.2)
BASOPHILS RELATIVE PERCENT: 0.5 % (ref 0–1)
BILIRUB SERPL-MCNC: <0.2 MG/DL (ref 0.2–1.2)
BILIRUBIN URINE: NEGATIVE
BLOOD, URINE: NEGATIVE
BUN BLDV-MCNC: 23 MG/DL (ref 8–23)
CALCIUM SERPL-MCNC: 9.1 MG/DL (ref 8.8–10.2)
CHLORIDE BLD-SCNC: 106 MMOL/L (ref 98–111)
CLARITY: CLEAR
CO2: 23 MMOL/L (ref 22–29)
COLOR: YELLOW
CREAT SERPL-MCNC: 0.9 MG/DL (ref 0.5–0.9)
CRYSTALS, UA: ABNORMAL /HPF
EOSINOPHILS ABSOLUTE: 0.5 K/UL (ref 0–0.6)
EOSINOPHILS RELATIVE PERCENT: 6.1 % (ref 0–5)
EPITHELIAL CELLS, UA: 1 /HPF (ref 0–5)
GFR AFRICAN AMERICAN: >59
GFR NON-AFRICAN AMERICAN: >60
GLUCOSE BLD-MCNC: 108 MG/DL (ref 74–109)
GLUCOSE URINE: NEGATIVE MG/DL
HCT VFR BLD CALC: 39 % (ref 37–47)
HEMOGLOBIN: 12.4 G/DL (ref 12–16)
HEPATITIS C ANTIBODY INTERPRETATION: NORMAL
HYALINE CASTS: 2 /HPF (ref 0–8)
IMMATURE GRANULOCYTES #: 0 K/UL
KETONES, URINE: NEGATIVE MG/DL
LEUKOCYTE ESTERASE, URINE: ABNORMAL
LYMPHOCYTES ABSOLUTE: 1.6 K/UL (ref 1.1–4.5)
LYMPHOCYTES RELATIVE PERCENT: 21.9 % (ref 20–40)
MCH RBC QN AUTO: 31.7 PG (ref 27–31)
MCHC RBC AUTO-ENTMCNC: 31.8 G/DL (ref 33–37)
MCV RBC AUTO: 99.7 FL (ref 81–99)
MONOCYTES ABSOLUTE: 0.7 K/UL (ref 0–0.9)
MONOCYTES RELATIVE PERCENT: 9.2 % (ref 0–10)
NEUTROPHILS ABSOLUTE: 4.6 K/UL (ref 1.5–7.5)
NEUTROPHILS RELATIVE PERCENT: 62 % (ref 50–65)
NITRITE, URINE: NEGATIVE
PDW BLD-RTO: 13 % (ref 11.5–14.5)
PH UA: 5.5 (ref 5–8)
PLATELET # BLD: 193 K/UL (ref 130–400)
PMV BLD AUTO: 10.8 FL (ref 9.4–12.3)
POTASSIUM SERPL-SCNC: 4.1 MMOL/L (ref 3.5–5)
PROTEIN UA: NEGATIVE MG/DL
RBC # BLD: 3.91 M/UL (ref 4.2–5.4)
RBC UA: 1 /HPF (ref 0–4)
SODIUM BLD-SCNC: 140 MMOL/L (ref 136–145)
SPECIFIC GRAVITY UA: 1.02 (ref 1–1.03)
TOTAL PROTEIN: 6.6 G/DL (ref 6.6–8.7)
TSH SERPL DL<=0.05 MIU/L-ACNC: 4.47 UIU/ML (ref 0.27–4.2)
UROBILINOGEN, URINE: 0.2 E.U./DL
VITAMIN D 25-HYDROXY: 60.2 NG/ML
WBC # BLD: 7.4 K/UL (ref 4.8–10.8)
WBC UA: 22 /HPF (ref 0–5)

## 2021-10-15 NOTE — TELEPHONE ENCOUNTER
Andrea Velasco called requesting a refill of the below medication which has been pended for you:     Requested Prescriptions     Pending Prescriptions Disp Refills    diclofenac (VOLTAREN) 50 MG EC tablet [Pharmacy Med Name: DICLOFENAC SODIUM DR 50MG TABLET DELAYED RELEASE] 60 tablet 1     Sig: TAKE 1 TABLET BY MOUTH TWICE DAILY.        Last Appointment Date: 7/13/2021  Next Appointment Date: 10/18/2021    Allergies   Allergen Reactions    Ambien [Zolpidem Tartrate]     Codeine     Lyrica [Pregabalin]     Morphine     Requip [Ropinirole Hcl]     Tizanidine      Terrible nightmares

## 2021-10-17 LAB — URINE CULTURE, ROUTINE: NORMAL

## 2021-10-18 ENCOUNTER — OFFICE VISIT (OUTPATIENT)
Dept: INTERNAL MEDICINE | Age: 81
End: 2021-10-18
Payer: MEDICARE

## 2021-10-18 VITALS
DIASTOLIC BLOOD PRESSURE: 74 MMHG | OXYGEN SATURATION: 98 % | TEMPERATURE: 97.6 F | SYSTOLIC BLOOD PRESSURE: 132 MMHG | HEART RATE: 68 BPM

## 2021-10-18 DIAGNOSIS — R05.9 COUGH: ICD-10-CM

## 2021-10-18 DIAGNOSIS — Z00.00 HEALTHCARE MAINTENANCE: ICD-10-CM

## 2021-10-18 DIAGNOSIS — E55.9 VITAMIN D DEFICIENCY: ICD-10-CM

## 2021-10-18 DIAGNOSIS — E78.2 MIXED HYPERLIPIDEMIA: ICD-10-CM

## 2021-10-18 DIAGNOSIS — I10 PRIMARY HYPERTENSION: ICD-10-CM

## 2021-10-18 DIAGNOSIS — J34.89 MAXILLARY SINUS MASS: ICD-10-CM

## 2021-10-18 PROCEDURE — 99214 OFFICE O/P EST MOD 30 MIN: CPT | Performed by: NURSE PRACTITIONER

## 2021-10-18 PROCEDURE — 90694 VACC AIIV4 NO PRSRV 0.5ML IM: CPT | Performed by: NURSE PRACTITIONER

## 2021-10-18 PROCEDURE — G8400 PT W/DXA NO RESULTS DOC: HCPCS | Performed by: NURSE PRACTITIONER

## 2021-10-18 PROCEDURE — 4040F PNEUMOC VAC/ADMIN/RCVD: CPT | Performed by: NURSE PRACTITIONER

## 2021-10-18 PROCEDURE — 1036F TOBACCO NON-USER: CPT | Performed by: NURSE PRACTITIONER

## 2021-10-18 PROCEDURE — 1090F PRES/ABSN URINE INCON ASSESS: CPT | Performed by: NURSE PRACTITIONER

## 2021-10-18 PROCEDURE — G8427 DOCREV CUR MEDS BY ELIG CLIN: HCPCS | Performed by: NURSE PRACTITIONER

## 2021-10-18 PROCEDURE — 1123F ACP DISCUSS/DSCN MKR DOCD: CPT | Performed by: NURSE PRACTITIONER

## 2021-10-18 PROCEDURE — G8417 CALC BMI ABV UP PARAM F/U: HCPCS | Performed by: NURSE PRACTITIONER

## 2021-10-18 PROCEDURE — G0008 ADMIN INFLUENZA VIRUS VAC: HCPCS | Performed by: NURSE PRACTITIONER

## 2021-10-18 PROCEDURE — G8484 FLU IMMUNIZE NO ADMIN: HCPCS | Performed by: NURSE PRACTITIONER

## 2021-10-18 RX ORDER — MIRABEGRON 25 MG/1
50 TABLET, FILM COATED, EXTENDED RELEASE ORAL DAILY
COMMUNITY
Start: 2021-08-27 | End: 2022-02-01 | Stop reason: SDUPTHER

## 2021-10-18 RX ORDER — OXYCODONE HYDROCHLORIDE 15 MG/1
TABLET ORAL
COMMUNITY
Start: 2021-08-27 | End: 2021-10-18

## 2021-10-18 ASSESSMENT — ENCOUNTER SYMPTOMS
STRIDOR: 0
CHOKING: 0
ABDOMINAL PAIN: 0
TROUBLE SWALLOWING: 0
EYE ITCHING: 0
VOMITING: 0
BLOOD IN STOOL: 0
DIARRHEA: 0
SHORTNESS OF BREATH: 0
EYE DISCHARGE: 0
CONSTIPATION: 0
SORE THROAT: 0
COUGH: 1
NAUSEA: 0
ABDOMINAL DISTENTION: 0
COLOR CHANGE: 0
WHEEZING: 0

## 2021-10-18 NOTE — ASSESSMENT & PLAN NOTE
Right maxillary sinus cyst from x-rays in July she has an appointment next week with ENT at [facility]

## 2021-10-18 NOTE — ASSESSMENT & PLAN NOTE
Blood pressure is good she is on carvedilol 12.5 twice daily takes as directed no side effects no changes

## 2021-10-18 NOTE — PATIENT INSTRUCTIONS
1. GYN:  You need mammogram PAP and bone density    2. Cough; Discuss with ENT next week  3/  Hypertension; Stable for now   4. Cyst right sinus; Refer to ENT    #5 mixed hyperlipidemia stable on Lipitor no changes are necessary  6.  Vitamin D deficiency stable with weekly dose no changes are necessary

## 2021-10-18 NOTE — PROGRESS NOTES
[facility] PHYSICIAN SERVICES  [facility] INTERNAL MEDICINE  [address] [facility] [address] [address]  Dept: [phone]  Dept Fax: [phone]: [phone]    Debbie Nieto (:  1940) is a 80 y.o. female,Established patient, here for evaluation of the following chief complaint(s): Hector Patton is a 80 y.o. female who presents today for her medical conditions/complaints as noted below. Debbie Nieto is c/maame 3 Month Follow-Up        HPI:     Chief Complaint   Patient presents with    3 Month Follow-Up     HPI   1. Cough she continues to have some of her family told her is probably her blood pressure medicine,   2,  Hypertension pressure is 132/74 she is on carvedilol 12.5 twice daily and Bumex 1 mg daily  3.  CYst on right side of sinuses from xrays we did in July; She has appt this week   4. GYN she has appt  with DR Alessandra Becerril  'she orders mammogram and bone density   5. Vit d def;   She is taking weekly and her level is 61;    6.   hyperlipidemia she is on Lipitor 40 daily labs are good today  Past Medical History:   Diagnosis Date    Adenocarcinoma of endometrium, stage 1 ([address] 2017    Anemia     Arthritis     CAD (coronary artery disease)     \"Stiff Valve\"    Chronic kidney disease     Depression     Fibrocystic breast disease     17 biopsy - benign FCBD w/microcalcifications - [facility]     GERD (gastroesophageal reflux disease)     H/O vaginal bleeding     managed Dr Alessandra Becerril    Hypertension     Hypothyroidism (acquired)     Hypothyroidism (acquired)     Mixed hyperlipidemia     Obesity     Osteoarthritis     Pain of both hip joints 2016    Situational anxiety     Sleep apnea     Vitamin D deficiency       Past Surgical History:   Procedure Laterality Date    BREAST BIOPSY Right     BREAST SURGERY Left     Biopsy, Benign    CYST REMOVAL Left     Shoulder cyst, benign    DILATION AND CURETTAGE OF UTERUS      EYE SURGERY 03/2017    HYSTERECTOMY      Removed uterus and cervix due to cancer, 12/17    LEG SURGERY Left     Tib/Fib ORIF    TONSILLECTOMY         Vitals 10/18/2021 10/18/2021 10/18/2021 7/13/2021 7/13/2021 8/6/9970   SYSTOLIC 532 053 235 552 492 727   DIASTOLIC 74 76 74 70 90 85   Site - - - - - -   Position - - - - - -   Pulse - - 68 - 78 70   Temp - - 97.6 - 97.6 -   Resp - - - - - -   SpO2 - - 98 - 96 -   Weight - - - - 230 lb 225 lb   Height - - - - 5' 2\" 5' 2\"   Body mass index - - - - 42.06 kg/m2 41.15 kg/m2   Pain Level - - - - - -   Some recent data might be hidden       Family History   Problem Relation Age of Onset    Cancer Mother        Social History     Tobacco Use    Smoking status: Never Smoker    Smokeless tobacco: Never Used   Substance Use Topics    Alcohol use: Yes     Comment: occassionally      Current Outpatient Medications   Medication Sig Dispense Refill    diclofenac (VOLTAREN) 50 MG EC tablet TAKE 1 TABLET BY MOUTH TWICE DAILY. 60 tablet 1    MYRBETRIQ 25 MG TB24       vitamin D (ERGOCALCIFEROL) 1.25 MG (59356 UT) CAPS capsule TAKE 1 CAPSULE BY MOUTH ONCE A WEEK  12 capsule 2    carvedilol (COREG) 12.5 MG tablet TAKE 1 TABLET TWICE A DAY 60 tablet 0    cyclobenzaprine (FLEXERIL) 10 MG tablet Take 0.5 tablets by mouth 2 times daily as needed for Muscle spasms 90 tablet 0    lansoprazole (PREVACID) 30 MG delayed release capsule TAKE 1 CAPSULE DAILY EVERY MORNING 90 capsule 1    donepezil (ARICEPT) 10 MG tablet Take 1 tablet by mouth nightly 90 tablet 1    atorvastatin (LIPITOR) 40 MG tablet TAKE 1 TABLET DAILY AS DIRECTED.  90 tablet 3    levothyroxine (SYNTHROID) 50 MCG tablet TAKE 1 TABLET DAILY 90 tablet 3    SUMAtriptan (IMITREX) 50 MG tablet Take 1 tablet by mouth 2 times daily as needed for Migraine (Max 2/day -- 3 month supply) 180 tablet 1    bumetanide (BUMEX) 1 MG tablet TAKE 1 TABLET DAILY 90 tablet 3    Omega-3 Fatty Acids (FISH OIL) 1000 MG CAPS Take 2 capsules by mouth 2 times daily 180 capsule 2    diclofenac (VOLTAREN) 75 MG EC tablet       sertraline (ZOLOFT) 25 MG tablet       ALPRAZolam (XANAX) 0.5 MG tablet       venlafaxine (EFFEXOR) 25 MG tablet       butalbital-acetaminophen-caffeine (FIORICET, ESGIC) -40 MG per tablet       Prenatal Multivit-Min-Fe-FA (PRENATAL 1 + IRON PO) Take by mouth (Patient not taking: Reported on 7/13/2021)      loratadine (CLARITIN) 10 MG capsule Take 10 mg by mouth daily      FIBER FORMULA PO Take by mouth       No current facility-administered medications for this visit.      Allergies   Allergen Reactions    Ambien [Zolpidem Tartrate]     Codeine     Lyrica [Pregabalin]     Morphine     Requip [Ropinirole Hcl]     Tizanidine      Terrible nightmares         Health Maintenance   Topic Date Due    DEXA (modify frequency per FRAX score)  Never done    Breast cancer screen  08/30/2021    DTaP/Tdap/Td vaccine (1 - Tdap) 11/08/2021 (Originally 9/19/1959)    Shingles Vaccine (3 of 3) 04/05/2022 (Originally 12/5/2019)    Lipid screen  04/02/2022    Annual Wellness Visit (AWV)  07/14/2022    TSH testing  10/15/2022    Potassium monitoring  10/15/2022    Creatinine monitoring  10/15/2022    Flu vaccine  Completed    Pneumococcal 65+ years Vaccine  Completed    COVID-19 Vaccine  Completed    Hepatitis C screen  Completed    Hepatitis A vaccine  Aged Out    Hepatitis B vaccine  Aged Out    Hib vaccine  Aged Out    Meningococcal (ACWY) vaccine  Aged Out       No results found for: LABA1C  No results found for: PSA, PSADIA  TSH   Date Value Ref Range Status   10/15/2021 4.470 (H) 0.270 - 4.200 uIU/mL Final   ]  Lab Results   Component Value Date     10/15/2021    K 4.1 10/15/2021     10/15/2021    CO2 23 10/15/2021    BUN 23 10/15/2021    CREATININE 0.9 10/15/2021    GLUCOSE 108 10/15/2021    CALCIUM 9.1 10/15/2021    PROT 6.6 10/15/2021    LABALBU 3.9 10/15/2021    BILITOT <0.2 10/15/2021    ALKPHOS 91 Negative for gait problem. Skin: Negative for color change and rash. Allergic/Immunologic: Negative for food allergies and immunocompromised state. Neurological: Negative for dizziness, tremors, syncope, speech difficulty, weakness and headaches. Hematological: Negative for adenopathy. Does not bruise/bleed easily. Psychiatric/Behavioral: Negative for confusion and hallucinations. Objective:     Physical Exam  Constitutional:       General: She is not in acute distress. Appearance: She is well-developed. HENT:      Head: Normocephalic and atraumatic. Eyes:      General: No scleral icterus. Right eye: No discharge. Left eye: No discharge. Pupils: Pupils are equal, round, and reactive to light. Neck:      Thyroid: No thyromegaly. Vascular: No JVD. Cardiovascular:      Rate and Rhythm: Normal rate and regular rhythm. Heart sounds: Normal heart sounds. No murmur heard. Pulmonary:      Effort: Pulmonary effort is normal. No respiratory distress. Breath sounds: Normal breath sounds. No wheezing or rales. Abdominal:      General: Bowel sounds are normal. There is no distension. Palpations: Abdomen is soft. There is no mass. Tenderness: There is no abdominal tenderness. There is no guarding or rebound. Musculoskeletal:         General: No tenderness. Normal range of motion. Cervical back: Normal range of motion and neck supple. Comments: She is low back pain;     Skin:     General: Skin is warm and dry. Findings: No erythema or rash. Neurological:      Mental Status: She is alert and oriented to person, place, and time. Cranial Nerves: No cranial nerve deficit. Coordination: Coordination normal.      Deep Tendon Reflexes: Reflexes are normal and symmetric. Reflexes normal.   Psychiatric:         Mood and Affect: Mood is not depressed. Behavior: Behavior normal.         Thought Content:  Thought content normal. Judgment: Judgment normal.       /74   Pulse 68   Temp 97.6 °F (36.4 °C)   SpO2 98%           Assessment:      Problem List     Cough     She will be seeing ENT for cyst of sinuses by us that we address that to may be from her sinus drainage          Healthcare maintenance      She will get her mammogram bone density and Pap with Dr. Ole Kehr on her November 9 appointment         Hypertension     Blood pressure is good she is on carvedilol 12.5 twice daily takes as directed no side effects no changes          Maxillary sinus mass     Right maxillary sinus cyst from x-rays in July she has an appointment next week with ENT at [facility]          Mixed hyperlipidemia     Lipid panel is good today she is on Lipitor 40 no changes are necessary          Relevant Medications    atorvastatin (LIPITOR) 40 MG tablet    bumetanide (BUMEX) 1 MG tablet    carvedilol (COREG) 12.5 MG tablet    Vitamin D deficiency     Level is good continue to take the vitamin D weekly                Plan:        Patient given educational materials - see patient instructions. Discussed use, benefit, and side effects of prescribed medications. Allpatient questions answered. Pt voiced understanding. Reviewed health maintenance. Instructed to continue current medications, diet and exercise. Patient agreed with treatment plan. Follow up as directed. MEDICATIONS:  No orders of the defined types were placed in this encounter. ORDERS:  Orders Placed This Encounter   Procedures    INFLUENZA, QUADV, ADJUVANTED, 72 YRS =, IM, PF, PREFILL SYR, 0.5ML (FLUAD)       Follow-up:  Return in about 3 months (around 1/18/2022) for have labs done prior to appt. PATIENT INSTRUCTIONS:  Patient Instructions   1. GYN:  You need mammogram PAP and bone density    2. Cough; Discuss with ENT next week  3/  Hypertension; Stable for now   4. Cyst right sinus;   Refer to ENT    #5 mixed hyperlipidemia stable on Lipitor no changes are necessary  6. Vitamin D deficiency stable with weekly dose no changes are necessary    Electronically signed by TAMIA Modi on 10/18/2021 at 4:51 PM    @On this date 10/18/2021 I have spent 16 minutes reviewing previous notes, test results and face to face with the patient discussing the diagnosis and importance of compliance with the treatment plan as well as documenting on the day of the visit. EMR[employer]/transcription disclaimer:  Much of this encounter note is electronic transcription/translation of spoken language to printed texts. The electronic translation of spoken language may be erroneous, or at times,nonsensical words or phrases may be inadvertently transcribed.   Although I have reviewed the note for such errors, some may still exist.

## 2021-10-18 NOTE — ASSESSMENT & PLAN NOTE
She will be seeing ENT for cyst of sinuses by us that we address that to may be from her sinus drainage

## 2021-11-01 RX ORDER — CARVEDILOL 12.5 MG/1
TABLET ORAL
Qty: 60 TABLET | Refills: 0 | Status: SHIPPED | OUTPATIENT
Start: 2021-11-01 | End: 2021-12-07 | Stop reason: SDUPTHER

## 2021-11-01 NOTE — TELEPHONE ENCOUNTER
Cassia Luna called requesting a refill of the below medication which has been pended for you:     Requested Prescriptions     Pending Prescriptions Disp Refills    carvedilol (COREG) 12.5 MG tablet 60 tablet 0     Sig: TAKE 1 TABLET TWICE A DAY       Last Appointment Date: 10/18/2021  Next Appointment Date: 1/17/2022    Allergies   Allergen Reactions    Ambien [Zolpidem Tartrate]     Codeine     Lyrica [Pregabalin]     Morphine     Requip [Ropinirole Hcl]     Tizanidine      Terrible nightmares

## 2021-11-08 ENCOUNTER — TELEPHONE (OUTPATIENT)
Dept: INTERNAL MEDICINE | Age: 81
End: 2021-11-08

## 2021-11-08 RX ORDER — DICLOFENAC SODIUM 75 MG/1
75 TABLET, DELAYED RELEASE ORAL 2 TIMES DAILY
Qty: 60 TABLET | Refills: 5 | OUTPATIENT
Start: 2021-11-08

## 2021-11-08 NOTE — TELEPHONE ENCOUNTER
S/w pt, she needs refills and possible altercations on two medications sent to [facility] Dru.) Cyclobenzaprine 10 mg states she would like tid instead of bid  2.) Diclofenac  Advised to call one week before she runs out

## 2021-11-08 NOTE — TELEPHONE ENCOUNTER
Arian Ochoa called requesting a refill of the below medication which has been pended for you:     Requested Prescriptions     Pending Prescriptions Disp Refills    cyclobenzaprine (FLEXERIL) 10 MG tablet 90 tablet 0     Sig: Take 0.5 tablets by mouth 3 times daily as needed for Muscle spasms    diclofenac (VOLTAREN) 75 MG EC tablet 60 tablet 5     Sig: Take 1 tablet by mouth 2 times daily       Last Appointment Date: 10/18/2021  Next Appointment Date: 1/17/2022    Allergies   Allergen Reactions    Ambien [Zolpidem Tartrate]     Codeine     Lyrica [Pregabalin]     Morphine     Requip [Ropinirole Hcl]     Tizanidine      Terrible nightmares

## 2021-11-09 ENCOUNTER — OFFICE VISIT (OUTPATIENT)
Dept: OBSTETRICS AND GYNECOLOGY | Facility: CLINIC | Age: 81
End: 2021-11-09

## 2021-11-09 VITALS
SYSTOLIC BLOOD PRESSURE: 148 MMHG | BODY MASS INDEX: 38.57 KG/M2 | WEIGHT: 240 LBS | HEIGHT: 66 IN | DIASTOLIC BLOOD PRESSURE: 86 MMHG

## 2021-11-09 DIAGNOSIS — Z03.89 ENCOUNTER FOR OBSERVATION FOR OTHER SUSPECTED DISEASES AND CONDITIONS RULED OUT: ICD-10-CM

## 2021-11-09 DIAGNOSIS — R51.9 CHRONIC NONINTRACTABLE HEADACHE, UNSPECIFIED HEADACHE TYPE: ICD-10-CM

## 2021-11-09 DIAGNOSIS — E66.01 MORBIDLY OBESE (HCC): ICD-10-CM

## 2021-11-09 DIAGNOSIS — F33.1 MODERATE EPISODE OF RECURRENT MAJOR DEPRESSIVE DISORDER (HCC): ICD-10-CM

## 2021-11-09 DIAGNOSIS — Z78.0 POSTMENOPAUSAL: ICD-10-CM

## 2021-11-09 DIAGNOSIS — G89.29 CHRONIC NONINTRACTABLE HEADACHE, UNSPECIFIED HEADACHE TYPE: ICD-10-CM

## 2021-11-09 DIAGNOSIS — F51.01 PRIMARY INSOMNIA: ICD-10-CM

## 2021-11-09 DIAGNOSIS — R15.9 INCONTINENCE OF FECES, UNSPECIFIED FECAL INCONTINENCE TYPE: ICD-10-CM

## 2021-11-09 DIAGNOSIS — F41.9 ANXIETY: ICD-10-CM

## 2021-11-09 DIAGNOSIS — R32 URINARY INCONTINENCE, UNSPECIFIED TYPE: Primary | ICD-10-CM

## 2021-11-09 PROCEDURE — 99214 OFFICE O/P EST MOD 30 MIN: CPT | Performed by: OBSTETRICS & GYNECOLOGY

## 2021-11-09 RX ORDER — VENLAFAXINE 25 MG/1
25 TABLET ORAL DAILY
Qty: 90 TABLET | Refills: 3 | Status: SHIPPED | OUTPATIENT
Start: 2021-11-09 | End: 2022-02-08 | Stop reason: SDUPTHER

## 2021-11-09 RX ORDER — SERTRALINE HYDROCHLORIDE 25 MG/1
25 TABLET, FILM COATED ORAL DAILY
Qty: 90 TABLET | Refills: 3 | Status: SHIPPED | OUTPATIENT
Start: 2021-11-09 | End: 2022-02-08 | Stop reason: SDUPTHER

## 2021-11-09 RX ORDER — MIRABEGRON 25 MG/1
25 TABLET, FILM COATED, EXTENDED RELEASE ORAL DAILY
COMMUNITY
Start: 2021-08-27 | End: 2022-05-10 | Stop reason: SDUPTHER

## 2021-11-09 RX ORDER — BUTALBITAL, ACETAMINOPHEN AND CAFFEINE 50; 325; 40 MG/1; MG/1; MG/1
1 TABLET ORAL EVERY 4 HOURS PRN
Qty: 20 TABLET | Refills: 2 | Status: ON HOLD | OUTPATIENT
Start: 2021-11-09 | End: 2022-08-20

## 2021-11-09 RX ORDER — CYCLOBENZAPRINE HCL 10 MG
5 TABLET ORAL 3 TIMES DAILY PRN
Qty: 90 TABLET | Refills: 0 | Status: SHIPPED | OUTPATIENT
Start: 2021-11-09 | End: 2021-12-28 | Stop reason: SDUPTHER

## 2021-11-09 RX ORDER — ALPRAZOLAM 1 MG/1
1 TABLET ORAL 3 TIMES DAILY PRN
Qty: 90 TABLET | Refills: 2 | Status: SHIPPED | OUTPATIENT
Start: 2021-11-09 | End: 2022-02-08 | Stop reason: SDUPTHER

## 2021-11-09 NOTE — PROGRESS NOTES
"Subjective   Chief Complaint   Patient presents with   • Anxiety     pt here today for refill on xanax. pt voices doing well with current dosage. pt voices no other concerns.      Jennifer Gomes is a 81 y.o. year old .  No LMP recorded (lmp unknown). Patient has had a hysterectomy.  She presents to be seen for follow-up of anxiety.  Patient reports that her anxiety and depression are both well-controlled with zoloft and xanax.  Patient is very pleased.    She c/o OAB being poorly-controlled with Myrbetriq.  It used to work well, but she feels like it is no longer working and wants to know if there is a higher dose or other newer option.    The following portions of the patient's history were reviewed and updated as appropriate:current medications and allergies    Social History    Tobacco Use      Smoking status: Never Smoker      Smokeless tobacco: Never Used    Review of Systems   Constitutional: Positive for unexpected weight change (gained about 35 pounds last year, but weight now stable). Negative for activity change.   Respiratory: Negative for shortness of breath.    Cardiovascular: Negative for chest pain.   Gastrointestinal: Positive for abdominal pain (mild, only for past 24 hours) and constipation.        + fecal incontinence   Genitourinary: Positive for enuresis. Negative for difficulty urinating.   Musculoskeletal: Positive for arthralgias and back pain.   Psychiatric/Behavioral: Positive for dysphoric mood (stable on zoloft, although chronically depressed due to pain and difficulty ambulating now) and sleep disturbance (significant.  Pt takes 3 mg of xanax most nights, divided into two doses, in order to sleep). The patient is nervous/anxious (pt takes xanax prn during the day, but reports that is rare).          Objective   /86   Ht 167.6 cm (66\")   Wt 109 kg (240 lb)   LMP  (LMP Unknown)   BMI 38.74 kg/m²     Physical Exam  Vitals and nursing note reviewed.   Constitutional:       " "General: She is not in acute distress.     Appearance: She is well-developed. She is obese.   HENT:      Head: Normocephalic and atraumatic.   Neck:      Thyroid: No thyromegaly.   Pulmonary:      Effort: Pulmonary effort is normal.   Abdominal:      General: There is no distension.      Palpations: Abdomen is soft.      Tenderness: There is no abdominal tenderness.   Musculoskeletal:      Cervical back: Normal range of motion.      Comments: Patient in wheelchair today   Skin:     General: Skin is warm and dry.   Neurological:      Mental Status: She is alert and oriented to person, place, and time.   Psychiatric:         Behavior: Behavior normal.         Judgment: Judgment normal.         Lab Review   No data reviewed    Imaging   No data reviewed     Assessment & Plan    Diagnoses and all orders for this visit:    1. Urinary incontinence, unspecified type (Primary): Patient reports Myrbetriq no longer as helpful as it used to be.  Checking for UTI as cause of worsening incontinence and frequency.  Patient asking questions about her options.  She is already been on several anticholinergic medications without success.  Julius described, but patient not interested  -     Urinalysis With Microscopic If Indicated (No Culture) - Urine, Clean Catch  -     Urine Culture - Urine, Urine, Clean Catch    2. Incontinence of feces, unspecified fecal incontinence type: This problem would also be potentially addressed by Julius, but patient is not interested    3. Anxiety: Takes 3 mg of Xanax every evening to get sleep.  She divides this into 2 doses that are 3 to 4 hours apart.  Patient will occasionally take half of a tablet during the day for anxiety, but rarely has any \"extra\" because she takes it all at night.  Patient has previously asked for increases in her 24 hour dosage, but I have declined to increase her dosage.  We have again reviewed how habit-forming this medication is.  We have often discussed how " dangerous it is for her to have this medication in the house since her daughter is a recovering addict - although her daughter prefers alcohol over drugs.  Refills of xanax, effexor and zoloft all sent to pharmacy.    -     venlafaxine (EFFEXOR) 25 MG tablet; Take 1 tablet by mouth Daily.  Dispense: 90 tablet; Refill: 3  -     ALPRAZolam (XANAX) 1 MG tablet; Take 1 tablet by mouth 3 (Three) Times a Day As Needed for Anxiety.  Dispense: 90 tablet; Refill: 2  -     Urine Drug Screen - Urine, Clean Catch    4. Moderate episode of recurrent major depressive disorder (HCC)  -     sertraline (ZOLOFT) 25 MG tablet; Take 1 tablet by mouth Daily.  Dispense: 90 tablet; Refill: 3    5. Morbidly obese (HCC): Patient unable to exercise due to difficulty ambulating    6. Postmenopausal    7. Primary insomnia  -     ALPRAZolam (XANAX) 1 MG tablet; Take 1 tablet by mouth 3 (Three) Times a Day As Needed for Anxiety.  Dispense: 90 tablet; Refill: 2    8. Chronic nonintractable headache, unspecified headache type  -     butalbital-acetaminophen-caffeine (FIORICET, ESGIC) -40 MG per tablet; Take 1 tablet by mouth Every 4 (Four) Hours As Needed for Headache.  Dispense: 20 tablet; Refill: 2    9. Encounter for observation for other suspected diseases and conditions ruled out   -     Urine Drug Screen - Urine, Clean Catch        This note was electronically signed.    Shasta Madrigal MD  November 9, 2021  15:10 CST    Total time spent today with Jennifer  was 20-29 minutes (level 3).  Greater than 50% of the time was spent coordinating care, answering her questions and counseling regarding pathophysiology of her presenting problem along with plans for any diagnositc work-up and treatment.

## 2021-11-15 LAB
APPEARANCE UR: CLEAR
BACTERIA #/AREA URNS HPF: ABNORMAL /HPF
BACTERIA UR CULT: ABNORMAL
BACTERIA UR CULT: ABNORMAL
BILIRUB UR QL STRIP: NEGATIVE
CASTS URNS MICRO: ABNORMAL
COLOR UR: ABNORMAL
EPI CELLS #/AREA URNS HPF: ABNORMAL /HPF
GLUCOSE UR QL: NEGATIVE
HGB UR QL STRIP: NEGATIVE
KETONES UR QL STRIP: ABNORMAL
LEUKOCYTE ESTERASE UR QL STRIP: ABNORMAL
NITRITE UR QL STRIP: NEGATIVE
OTHER ANTIBIOTIC SUSC ISLT: ABNORMAL
PH UR STRIP: 5.5 [PH] (ref 5–8)
PROT UR QL STRIP: ABNORMAL
RBC #/AREA URNS HPF: ABNORMAL /HPF
SP GR UR: ABNORMAL (ref 1–1.03)
UROBILINOGEN UR STRIP-MCNC: ABNORMAL MG/DL
WBC #/AREA URNS HPF: ABNORMAL /HPF

## 2021-11-16 RX ORDER — LEVOFLOXACIN 500 MG/1
500 TABLET, FILM COATED ORAL DAILY
Qty: 10 TABLET | Refills: 0 | Status: SHIPPED | OUTPATIENT
Start: 2021-11-16 | End: 2021-11-26

## 2021-11-16 NOTE — PROGRESS NOTES
Please advise patient that an antibiotic has been sent to her pharmacy for urinary tract infection.  Thanks

## 2021-11-17 PROBLEM — R05.9 COUGH: Status: RESOLVED | Noted: 2021-10-18 | Resolved: 2021-11-17

## 2021-11-17 PROBLEM — Z00.00 HEALTHCARE MAINTENANCE: Status: RESOLVED | Noted: 2021-10-18 | Resolved: 2021-11-17

## 2021-11-17 RX ORDER — SUMATRIPTAN 50 MG/1
TABLET, FILM COATED ORAL
Qty: 18 TABLET | Refills: 10 | Status: SHIPPED | OUTPATIENT
Start: 2021-11-17 | End: 2021-11-23 | Stop reason: SDUPTHER

## 2021-11-17 NOTE — TELEPHONE ENCOUNTER
Rob Even called requesting a refill of the below medication which has been pended for you:     Requested Prescriptions     Pending Prescriptions Disp Refills    SUMAtriptan (IMITREX) 50 MG tablet [Pharmacy Med Name: SUMATRIPTAN SUCCINATE 50MG TABLET] 18 tablet 10     Sig: TAKE 1 TABLET BY MOUTH TWICE DAILY AS NEEDED FOR MIGRAINE. MAX 2 TABLETS DAILY.        Last Appointment Date: 10/18/2021  Next Appointment Date: 1/17/2022    Allergies   Allergen Reactions    Ambien [Zolpidem Tartrate]     Codeine     Lyrica [Pregabalin]     Morphine     Requip [Ropinirole Hcl]     Tizanidine      Terrible nightmares

## 2021-11-23 RX ORDER — SUMATRIPTAN 50 MG/1
TABLET, FILM COATED ORAL
Qty: 18 TABLET | Refills: 10 | Status: SHIPPED | OUTPATIENT
Start: 2021-11-23 | End: 2022-02-01 | Stop reason: SDUPTHER

## 2021-11-23 NOTE — TELEPHONE ENCOUNTER
----- Message from [facility] sent at 11/22/2021  4:35 PM CST -----  Subject: Refill Request    QUESTIONS  Name of Medication? SUMAtriptan (IMITREX) 50 MG tablet  Patient-reported dosage and instructions? 50 mg  How many days do you have left? 0  Preferred Pharmacy? [facility] phone number (if available)? [phone]  ---------------------------------------------------------------------------  --------------  CALL BACK INFO  What is the best way for the office to contact you?  OK to leave message on   voicemail  Preferred Call Back Phone Number? [phone]

## 2021-11-23 NOTE — TELEPHONE ENCOUNTER
Natalya Thakur called requesting a refill of the below medication which has been pended for you:     Requested Prescriptions     Pending Prescriptions Disp Refills    SUMAtriptan (IMITREX) 50 MG tablet 18 tablet 10     Sig: TAKE 1 TABLET BY MOUTH TWICE DAILY AS NEEDED FOR MIGRAINE. MAX 2 TABLETS DAILY.        Last Appointment Date: 10/18/2021  Next Appointment Date: 1/17/2022    Allergies   Allergen Reactions    Ambien [Zolpidem Tartrate]     Codeine     Lyrica [Pregabalin]     Morphine     Requip [Ropinirole Hcl]     Tizanidine      Terrible nightmares

## 2021-11-29 ENCOUNTER — OFFICE VISIT (OUTPATIENT)
Dept: OTOLARYNGOLOGY | Facility: CLINIC | Age: 81
End: 2021-11-29

## 2021-11-29 VITALS
HEIGHT: 66 IN | DIASTOLIC BLOOD PRESSURE: 71 MMHG | SYSTOLIC BLOOD PRESSURE: 118 MMHG | WEIGHT: 240 LBS | HEART RATE: 71 BPM | BODY MASS INDEX: 38.57 KG/M2

## 2021-11-29 DIAGNOSIS — H90.3 SENSORINEURAL HEARING LOSS (SNHL), BILATERAL: ICD-10-CM

## 2021-11-29 DIAGNOSIS — M26.621 ARTHRALGIA OF RIGHT TEMPOROMANDIBULAR JOINT: ICD-10-CM

## 2021-11-29 DIAGNOSIS — G47.33 OSA (OBSTRUCTIVE SLEEP APNEA): ICD-10-CM

## 2021-11-29 DIAGNOSIS — J34.1 MAXILLARY SINUS CYST: Primary | ICD-10-CM

## 2021-11-29 DIAGNOSIS — H92.01 RIGHT EAR PAIN: ICD-10-CM

## 2021-11-29 DIAGNOSIS — M79.18 MYOFASCIAL PAIN ON RIGHT SIDE: ICD-10-CM

## 2021-11-29 DIAGNOSIS — H93.A3 PULSATILE TINNITUS OF BOTH EARS: ICD-10-CM

## 2021-11-29 PROCEDURE — 99204 OFFICE O/P NEW MOD 45 MIN: CPT | Performed by: OTOLARYNGOLOGY

## 2021-11-29 RX ORDER — SODIUM BICARBONATE 650 MG/1
650 TABLET ORAL 2 TIMES DAILY
COMMUNITY
End: 2021-11-29

## 2021-11-29 RX ORDER — CETIRIZINE HYDROCHLORIDE 10 MG/1
10 TABLET ORAL DAILY
COMMUNITY
End: 2022-03-29

## 2021-11-29 RX ORDER — LORATADINE 10 MG/1
10 TABLET ORAL DAILY
COMMUNITY
End: 2022-03-29

## 2021-11-29 RX ORDER — DICLOFENAC SODIUM 75 MG/1
75 TABLET, DELAYED RELEASE ORAL 2 TIMES DAILY
Qty: 60 TABLET | Refills: 1 | Status: SHIPPED | OUTPATIENT
Start: 2021-11-29 | End: 2022-02-01 | Stop reason: SDUPTHER

## 2021-11-29 RX ORDER — BUMETANIDE 1 MG/1
TABLET ORAL
Status: ON HOLD | COMMUNITY
Start: 2021-11-26 | End: 2022-08-19

## 2021-11-29 NOTE — TELEPHONE ENCOUNTER
Natalya Thakur called requesting a refill of the below medication which has been pended for you:     Requested Prescriptions     Pending Prescriptions Disp Refills    diclofenac (VOLTAREN) 75 MG EC tablet 60 tablet 1     Sig: Take 1 tablet by mouth 2 times daily       Last Appointment Date: 10/18/2021  Next Appointment Date: 1/17/2022    Allergies   Allergen Reactions    Ambien [Zolpidem Tartrate]     Codeine     Lyrica [Pregabalin]     Morphine     Requip [Ropinirole Hcl]     Tizanidine      Terrible nightmares

## 2021-11-29 NOTE — PATIENT INSTRUCTIONS
NASAL SALINE:  Use 2 puffs each nostril 4-6 times daily and more frequently if possible.  You can buy saline spray or you can make your own and use an old spray bottle to administer  Use a humidifier at bedside  Recipe for saline:  Water                                 1 quart  Salt (table)                        1 tablespoon  Gylcerin (or Jyoti Syrup)    1 teaspoon  Sodium bicarbonate           1 teaspoon  Sprays or [place] pots are recommended    Do not allow to stand for more than 24 hrs. Make new solution. There is no preservative in this solution.    TEMPOROMANDIBULAR JOINT EXERCISES     The temporomandibular joint, or the TMJ, is the jaw joint. This joint can frequently be a source of pain in the head and neck region. Typically because of its location, pain is felt either in area just in front of the ear, or in the ear itself. However, pain in the TMJ can be referred to any part of the head and neck.     An examination by the physician frequently reveals tenderness around the joint. Oftentimes, a crack or pop can also be felt. This may be an indication that the pain is in all actuality coming from the joint. An exhaustive search for a cause is carried out, in an effort to determine the etiology of the pain. Pain can be caused by grinding of teeth at night (bruxism), overuse (gum chewing, ice cracking, over opening mouth), poor dentition and malocclusion (bad bite). In an attempt to be thorough, your physician may involve others who have experience in this field, such as oral surgeons, dentists and pain experts.     Should you be diagnosed with TMJ pain, a course of conservative treatment is indicated, as this works in an overwhelming majority of cases. Because this pain originates from a joint, the treatment is similar to treatment for pain in other joints.  Treatment     Rest:  This is indicated to relieve the pressure on the joint. No chewing gum, tough meat, hard bread, cracking ice in the teeth, or any other  activity that places undue stress on the joint. Simply, if it causes it to hurt, stop doing it. This may be required for an unspecified period of time, usually 1 to 2 weeks.     Cold to the area:  This will reduce swelling and pain early in the course.     Heat to the area:  This improves blood flow to the area and speeds healing.     Pain Relievers:  Anti-inflammatory medications, such as aspirin, Motrin, Advil, Nuprin, Aleve or any other products in this category are satisfactory to use in the face of joint pain. Rarely are narcotics required, except possibly in the acute phase to gain some initial relief.  Dr. Angeles may administer pain blocks to relieve severe pain and spasm. Nerve blocks may also be used.    Recommendations:  ?       Reduce/eliminate caffeinated drinks  ?       Reduce high sugar drinks and foods  ?       Get plenty of rest  ?       Practice relaxation techniques; Yoga, Biofeedback, Austen Chi, etc.  ?       Exercise for overall fitness  ?       Eat healthy- High fiber, low fat/cholesterol eating, and change eating habits. We recommend Sugar Busters as a lifestyle change for eating.  ?       See your dentist regularly for checkups and bite checks.  Rehabilitation:  Once the acute pain has been controlled, you should begin exercises to strengthen and rehabilitate the TMJ.     Exercises: These should be performed for five minutes at least five times each day     Slowly open the mouth to its fullest extent, even using the fingers to exert gentle pressure.     Open and close the mouth as widely and rapidly as possible.     Open and close the mouth against resistance by placing the open palm underneath the chin, or the fingers on top of the chin.     Move the lower jaw side to side without resistance. Later, add resistance by placing both hands on either side of the jaw.     Push (protrude) the lower jaw without resistance. Later add resistance.     Should the above regimen fail to lead to  improvement, your physician will discuss with you other forms of therapy. Since almost all types of TMJ pain respond to conservative therapy, surgery is indicated only after exhausting the rehabilitation. Dr. Angeles does not perform rehabilitative surgery on the temporomandibular joint, but refers patients to an oral surgery who specialize in this type of surgery.    CT sinus ordered    See you primary care for sleep and TMJ recommendations      CONTACT INFORMATION:  The main office phone number is [phone]. For emergencies after hours and on weekends, this number will convert over to our answering service and the on call provider will answer. Please try to keep non emergent phone calls/ questions to office hours 9am-5pm Monday through Friday.     [employer]  As an alternative, you can sign up and use the Epic MyChart system for more direct and quicker access for non emergent questions/ problems.  [employer] allows you to send messages to your doctor, view your test results, renew your prescriptions, schedule appointments, and more. To sign up, go to [employer] and click on the Sign Up Now link in the New User? box. Enter your [employer] Activation Code exactly as it appears below along with the last four digits of your Social Security Number and your Date of Birth () to complete the sign-up process. If you do not sign up before the expiration date, you must request a new code.    [employer] Activation Code: [email or web address]  Expires: 2021  5:36 AM    If you have questions, you can email [email or web address] or call [phone] to talk to our [employer] staff. Remember, [employer] is NOT to be used for urgent needs. For medical emergencies, dial 911.

## 2021-11-29 NOTE — PROGRESS NOTES
Patricio Angeles Jr, MD  [facility]  [address], [address]  [place] [address]  Fax [phone]  Phone [phone]    Visit Type: NEW PATIENT   Chief Complaint   Patient presents with   • Sinus Problem        HPI  New Patient  Accompanied by:  Daughter  Jennifer Gomes is a  81 y.o. female with sinus mass. She says she had symptoms of ear pain and popping months ago. Sinus films obtained. She has no sinus symptoms now.  She has popping R ear joint. She has beating in the R ear when sying on pillow at night.  She has recently taken antibiotics for UTI and she is wondering whether this is a problem.  She has RAFAL and not treating. She is not tolerating CPAP.        History     Last Reviewed by Patricio Angeles Jr., MD on 11/29/2021 at  3:18 PM    Sections Reviewed    Medical, Family, Tobacco, Surgical      Problem list reviewed by Patricio Angeles Jr., MD on 11/29/2021 at  3:18 PM  Medicines reviewed by Patricio Angeles Jr., MD on 11/29/2021 at  3:18 PM  Allergies reviewed by Patricio Angeles Jr., MD on 11/29/2021 at  3:18 PM        Vital Signs:   Heart Rate:  [71] 71  BP: (118)/(71) 118/71  ENT Physical Exam  Constitutional  Appearance: patient appears well-developed and well-nourished,  Communication/Voice: communication appropriate for developmental age; vocal quality normal;  Constitutional comments: Morbid obese  In wheelchair  Head and Face  Appearance: head appears normal, face appears normal and face appears atraumatic;  Palpation: facial palpation normal;  Salivary: glands normal;  Ear  Hearing: intact;  Auricles: right auricle normal; left auricle normal;  External Mastoids: right external mastoid normal; left external mastoid normal;  Ear Canals: right ear canal normal; left ear canal normal;  Tympanic Membranes: right tympanic membrane normal; left tympanic membrane normal;  Nose  External Nose: nares patent  bilaterally; external nose normal;  Internal Nose: nasal mucosa normal; septum normal; bilateral inferior turbinates normal;  Oral Cavity/Oropharynx  Lips: normal;  Teeth: normal;  Gums: gingiva normal;  Tongue: normal;  Oral mucosa: normal;  Hard palate: normal;  Soft palate: normal;  Neck  Neck: neck normal; neck palpation normal;  Thyroid: thyroid normal;  Respiratory  Inspection: breathing unlabored; normal breathing rate;  Auscultation: breath sounds are clear;  Cardiovascular  Inspection: extremities are warm and well perfused; no peripheral edema present;  Auscultation: regular rate and rhythm;  Neurovestibular  Gait: using wheelchair;  Mental Status: alert and oriented;  Psychiatric: mood normal; affect is appropriate;  Cranial Nerves: cranial nerves intact;       Result Review    RESULTS REVIEW    I have reviewed the patients old records in the chart.   I have reviewed the patients old records in the chart.  The following results/records were reviewed:  I reviewed the patient's new imaging results and agree with the interpretation.   Referral to Otolaryngology for Maxillary sinus mass (07/21/2021)   EXTERNAL MEDICAL RECORDS - SCAN - EXT MED RECS_DAYAMISUKH ARELI CAROLINA [id number] (07/21/2021)  SCANNED - IMAGING (07/13/2021)  Yvette X ray reviewed- R max sinus cyst          Assessment/Plan    Diagnoses and all orders for this visit:    1. Maxillary sinus cyst (Primary)  Comments:  R side  Orders:  -     CT Sinus Without Contrast; Future    2. Myofascial pain on right side  Comments:  Causing R ear pain    3. Arthralgia of right temporomandibular joint  Comments:  Chronic    4. Right ear pain    5. Pulsatile tinnitus of both ears  Comments:  Probably related to heart  Orders:  -     Comprehensive Hearing Test; Future    6. RAFAL (obstructive sleep apnea)  Comments:  Untreated      7. Sensorineural hearing loss (SNHL), bilateral  Comments:  Evaluation with audiogram  Orders:  -     Comprehensive Hearing Test;  Future       Conservative management.     Patient appears to have a simple maxillary sinus cyst by sinus films done approximately 4 to 6 months ago.  She wishes to have definitive diagnosis.  I have ordered a CT scan.  I will have her continue nasal saline.  She does not tolerate nasal steroids well.  Patient has obstructive sleep apnea for which she is not treated.  Patient is complaining of difficulty hearing at the end of her visit.  I will get audiogram and evaluate next time.  The patient's right ear symptoms are related to TMJ myofascial pain.  She has had no prior therapy for this.  I will start her on TMJ recommendations.  I will refer her to her dentist as needed.  She has areas of arthritis that are poorly controlled.  I am concerned that the patient takes 8 aspirin a day.  I am concerned because of her advanced age and aspirin exposure she is at high risk for kidney injury.  Refer her back to her primary care physician for evaluation of arthritis and aspirin usage.     Nasal saline  CT scan of sinuses ordered  Heat to face  TMJ recommendations  Decrease aspirin  See primary care for arthritis therapy  Audio ordered        Return RTC after CT scan sinus, for Recheck sinus.      Patricio Angeles Jr, MD  11/29/21  15:29 CST

## 2021-12-06 ENCOUNTER — TELEPHONE (OUTPATIENT)
Dept: INTERNAL MEDICINE | Age: 81
End: 2021-12-06

## 2021-12-06 ENCOUNTER — HOSPITAL ENCOUNTER (OUTPATIENT)
Dept: CT IMAGING | Facility: HOSPITAL | Age: 81
Discharge: HOME | End: 2021-12-06
Admitting: OTOLARYNGOLOGY

## 2021-12-06 DIAGNOSIS — J34.1 MAXILLARY SINUS CYST: ICD-10-CM

## 2021-12-06 PROCEDURE — 70486 CT MAXILLOFACIAL W/O DYE: CPT

## 2021-12-07 RX ORDER — CARVEDILOL 12.5 MG/1
TABLET ORAL
Qty: 60 TABLET | Refills: 0 | Status: SHIPPED | OUTPATIENT
Start: 2021-12-07 | End: 2022-01-10 | Stop reason: SDUPTHER

## 2021-12-15 ENCOUNTER — TELEPHONE (OUTPATIENT)
Dept: OTOLARYNGOLOGY | Facility: CLINIC | Age: 81
End: 2021-12-15

## 2021-12-15 NOTE — TELEPHONE ENCOUNTER
I left patient a message about her appointment scheduled on 1/19/22 at 10:15 with Dr Angeles to discuss results of Sinus CT. I will mail patient appt reminder.

## 2021-12-27 NOTE — TELEPHONE ENCOUNTER
Melody Escamilla called requesting a refill of the below medication which has been pended for you:     Requested Prescriptions     Pending Prescriptions Disp Refills    cyclobenzaprine (FLEXERIL) 10 MG tablet 90 tablet 0     Sig: Take 0.5 tablets by mouth 3 times daily as needed for Muscle spasms       Last Appointment Date: 10/18/2021  Next Appointment Date: 1/17/2022    Allergies   Allergen Reactions    Ambien [Zolpidem Tartrate]     Codeine     Lyrica [Pregabalin]     Morphine     Requip [Ropinirole Hcl]     Tizanidine      Terrible nightmares

## 2021-12-28 RX ORDER — CYCLOBENZAPRINE HCL 10 MG
5 TABLET ORAL 3 TIMES DAILY PRN
Qty: 90 TABLET | Refills: 0 | Status: SHIPPED | OUTPATIENT
Start: 2021-12-28 | End: 2021-12-28 | Stop reason: SDUPTHER

## 2021-12-28 RX ORDER — CYCLOBENZAPRINE HCL 10 MG
10 TABLET ORAL 3 TIMES DAILY PRN
Qty: 90 TABLET | Refills: 0 | Status: SHIPPED | OUTPATIENT
Start: 2021-12-28 | End: 2022-01-28 | Stop reason: SDUPTHER

## 2022-01-10 ENCOUNTER — TELEPHONE (OUTPATIENT)
Dept: INTERNAL MEDICINE | Age: 82
End: 2022-01-10

## 2022-01-10 RX ORDER — CARVEDILOL 12.5 MG/1
TABLET ORAL
Qty: 60 TABLET | Refills: 5 | Status: SHIPPED | OUTPATIENT
Start: 2022-01-10 | End: 2022-01-12 | Stop reason: SDUPTHER

## 2022-01-12 ENCOUNTER — TELEPHONE (OUTPATIENT)
Dept: INTERNAL MEDICINE | Age: 82
End: 2022-01-12

## 2022-01-12 RX ORDER — CARVEDILOL 12.5 MG/1
TABLET ORAL
Qty: 60 TABLET | Refills: 5 | Status: SHIPPED | OUTPATIENT
Start: 2022-01-12 | End: 2022-04-18 | Stop reason: SDUPTHER

## 2022-01-12 RX ORDER — DONEPEZIL HYDROCHLORIDE 10 MG/1
10 TABLET, FILM COATED ORAL NIGHTLY
Qty: 90 TABLET | Refills: 1 | Status: SHIPPED | OUTPATIENT
Start: 2022-01-12 | End: 2022-07-26

## 2022-01-12 NOTE — TELEPHONE ENCOUNTER
Pascual Rome called requesting a refill of the below medication which has been pended for you:     Requested Prescriptions     Pending Prescriptions Disp Refills    donepezil (ARICEPT) 10 MG tablet 90 tablet 1     Sig: Take 1 tablet by mouth nightly    carvedilol (COREG) 12.5 MG tablet 60 tablet 5     Sig: TAKE 1 TABLET TWICE A DAY       Last Appointment Date: 10/18/2021  Next Appointment Date: 1/17/2022    Allergies   Allergen Reactions    Ambien [Zolpidem Tartrate]     Codeine     Lyrica [Pregabalin]     Morphine     Requip [Ropinirole Hcl]     Tizanidine      Terrible nightmares

## 2022-01-12 NOTE — TELEPHONE ENCOUNTER
Patient is requesting refills    Carvedilol 12.5mg  Donepezil 10mg    [facility]    Please call and advise

## 2022-01-19 ENCOUNTER — OFFICE VISIT (OUTPATIENT)
Dept: OTOLARYNGOLOGY | Facility: CLINIC | Age: 82
End: 2022-01-19

## 2022-01-19 DIAGNOSIS — H90.3 SENSORINEURAL HEARING LOSS (SNHL), BILATERAL: ICD-10-CM

## 2022-01-19 DIAGNOSIS — J34.1 MAXILLARY SINUS CYST: Primary | ICD-10-CM

## 2022-01-19 DIAGNOSIS — H92.01 RIGHT EAR PAIN: ICD-10-CM

## 2022-01-19 DIAGNOSIS — G47.33 OSA (OBSTRUCTIVE SLEEP APNEA): ICD-10-CM

## 2022-01-19 DIAGNOSIS — H93.A3 PULSATILE TINNITUS OF BOTH EARS: ICD-10-CM

## 2022-01-19 DIAGNOSIS — M79.18 MYOFASCIAL PAIN ON RIGHT SIDE: ICD-10-CM

## 2022-01-19 DIAGNOSIS — M26.621 ARTHRALGIA OF RIGHT TEMPOROMANDIBULAR JOINT: ICD-10-CM

## 2022-01-19 PROCEDURE — 99214 OFFICE O/P EST MOD 30 MIN: CPT | Performed by: OTOLARYNGOLOGY

## 2022-01-19 RX ORDER — OXYCODONE HYDROCHLORIDE 15 MG/1
TABLET ORAL
COMMUNITY
Start: 2021-12-29 | End: 2022-03-29

## 2022-01-19 RX ORDER — IPRATROPIUM BROMIDE 42 UG/1
2 SPRAY, METERED NASAL 4 TIMES DAILY PRN
Qty: 45 ML | Refills: 3 | Status: SHIPPED | OUTPATIENT
Start: 2022-01-19

## 2022-01-19 RX ORDER — DICLOFENAC SODIUM 75 MG/1
75 TABLET, DELAYED RELEASE ORAL 2 TIMES DAILY
COMMUNITY
Start: 2021-12-30 | End: 2022-11-23

## 2022-01-19 NOTE — PROGRESS NOTES
Patricio Angeles Jr, MD  [facility] & [facility]AT  [address], [address]  [facility] [address]  Fax [phone]  Phone [phone]      Visit Type: FOLLOW UP   Chief Complaint   Patient presents with   • Follow-up     CT scan        HPI   Accompanied by:  No one  She presents for a follow up evaluation. She denies sinus symptoms. She has drainage sown throat. Present all life. Attributes to allergy.  She denies ears popping.   She is on 24 hr allergy pill for drainage. She has some control.  Worse with eating.      History     Last Reviewed by Patricio Angeles Jr., MD on 1/19/2022 at 11:51 AM    Sections Reviewed    Medical, Family, Tobacco, Surgical      Problem list reviewed by Patricio Angeles Jr., MD on 1/19/2022 at 11:51 AM  Medicines reviewed by Patricio Angeles Jr., MD on 1/19/2022 at 11:51 AM  Allergies reviewed by Patricio Angeles Jr., MD on 1/19/2022 at 11:51 AM        Vital Signs:      ENT Physical Exam  Constitutional  Appearance: patient appears well-developed and well-nourished,  Communication/Voice: communication appropriate for developmental age; vocal quality normal;  Constitutional comments: Morbid obese  In wheelchair  Head and Face  Appearance: head appears normal, face appears normal and face appears atraumatic;  Palpation: facial palpation normal;  Salivary: glands normal;  Ear  Hearing: intact;  Auricles: right auricle normal; left auricle normal;  External Mastoids: right external mastoid normal; left external mastoid normal;  Ear Canals: right ear canal normal; left ear canal normal;  Tympanic Membranes: right tympanic membrane normal; left tympanic membrane normal;  Nose  External Nose: nares patent bilaterally; external nose normal;  Internal Nose: nasal mucosa normal; septum normal; bilateral inferior turbinates normal;  Oral Cavity/Oropharynx  Lips: normal;  Teeth: normal;  Gums: gingiva  normal;  Tongue: normal;  Oral mucosa: normal;  Hard palate: normal;  Soft palate: normal;  Neck  Neck: neck normal; neck palpation normal;  Thyroid: thyroid normal;  Respiratory  Inspection: breathing unlabored; normal breathing rate;  Auscultation: breath sounds are clear;  Cardiovascular  Inspection: extremities are warm and well perfused; no peripheral edema present;  Auscultation: regular rate and rhythm;  Neurovestibular  Gait: using wheelchair;  Mental Status: alert and oriented;  Psychiatric: mood normal; affect is appropriate;  Cranial Nerves: cranial nerves intact;  Drawings           Result Review    RESULTS REVIEW    I have reviewed the patients old records in the chart.   I have reviewed the patients old records in the chart.  The following results/records were reviewed:  I reviewed the patient's new imaging results and agree with the interpretation.   CT Sinus Without Contrast (12/06/2021 11:27)-reviewed, agree with radiology interpretation      Assessment/Plan    Diagnoses and all orders for this visit:    1. Maxillary sinus cyst (Primary)  Comments:  No change on CT scan from 12/2021    2. Arthralgia of right temporomandibular joint  Comments:  Improved    3. Myofascial pain on right side  Comments:  Proved    4. Right ear pain  Comments:  Proved    5. Pulsatile tinnitus of both ears  Comments:  Improved    6. RAFAL (obstructive sleep apnea)  Comments:  Untreated    7. Sensorineural hearing loss (SNHL), bilateral  Comments:  No change  Orders:  -     Comprehensive Hearing Test; Future    Other orders  -     ipratropium (ATROVENT) 0.06 % nasal spray; 2 sprays into the nostril(s) as directed by provider 4 (Four) Times a Day As Needed for Rhinitis. For drainage  Dispense: 45 mL; Refill: 3       Conservative management.     Patient appears to have resolved most of her symptoms.  She says she has no sinus symptoms.  She does not wish any further therapy.  She certainly does not wish surgery at this point in  time.  Her myofascial pain is also cleared up.  I will follow the patient closely and continue her on Flonase at this point in time.  Her major symptom seems to be that of postnasal drainage.  I will begin Atrovent for this.  If she remains stable, I will return her to the care of her primary care physician.  I will recheck audiogram on next visit to evaluate her hearing.  Flonase BID  Nasal saline  Atrovent  TMJ recommendations        Encouraged to enroll in My Chart  Encouraged to review data and findings in My Chart    Patient understand(s) and agree(s) with the treatment plan as described.    Return in about 3 months (around 4/19/2022) for Recheck Nose, drainage, Nasal scope, Audio.      Patricio Angeles Jr, MD  01/19/22  11:53 CST

## 2022-01-19 NOTE — PATIENT INSTRUCTIONS
NASAL SALINE:  Use 2 puffs each nostril 4-6 times daily and more frequently if possible.  You can buy saline spray or you can make your own and use an old spray bottle to administer  Use a humidifier at bedside  Recipe for saline:  Water                                 1 quart  Salt (table)                        1 tablespoon  Gylcerin (or Jyoti Syrup)    1 teaspoon  Sodium bicarbonate           1 teaspoon  Sprays or Ana pots are recommended    Do not allow to stand for more than 24 hrs. Make new solution. There is no preservative in this solution.    Nasal steroid use:  Using nasal steroids:  You will be prescribed one of the following nasal steroids: Flonase, Nasacort, Nasonex, Rhinocort, Qnasl, Zetonna  2 puffs each nostril 2 times daily  Start as soon as possible  If you are using Afrin for 3 days with the nasal steroid,  Use Afrin first and wait 10 minutes to allow the nose to open. Then administer nasal steroids.    Ipratromium Bromide (Atrovent) spray use 1-2 puffs each nostril 3-4 times daily AS needed for drainage    TEMPOROMANDIBULAR JOINT EXERCISES     The temporomandibular joint, or the TMJ, is the jaw joint. This joint can frequently be a source of pain in the head and neck region. Typically because of its location, pain is felt either in area just in front of the ear, or in the ear itself. However, pain in the TMJ can be referred to any part of the head and neck.     An examination by the physician frequently reveals tenderness around the joint. Oftentimes, a crack or pop can also be felt. This may be an indication that the pain is in all actuality coming from the joint. An exhaustive search for a cause is carried out, in an effort to determine the etiology of the pain. Pain can be caused by grinding of teeth at night (bruxism), overuse (gum chewing, ice cracking, over opening mouth), poor dentition and malocclusion (bad bite). In an attempt to be thorough, your physician may involve others who have  experience in this field, such as oral surgeons, dentists and pain experts.     Should you be diagnosed with TMJ pain, a course of conservative treatment is indicated, as this works in an overwhelming majority of cases. Because this pain originates from a joint, the treatment is similar to treatment for pain in other joints.  Treatment     Rest:  This is indicated to relieve the pressure on the joint. No chewing gum, tough meat, hard bread, cracking ice in the teeth, or any other activity that places undue stress on the joint. Simply, if it causes it to hurt, stop doing it. This may be required for an unspecified period of time, usually 1 to 2 weeks.     Cold to the area:  This will reduce swelling and pain early in the course.     Heat to the area:  This improves blood flow to the area and speeds healing.     Pain Relievers:  Anti-inflammatory medications, such as aspirin, Motrin, Advil, Nuprin, Aleve or any other products in this category are satisfactory to use in the face of joint pain. Rarely are narcotics required, except possibly in the acute phase to gain some initial relief.  Dr. Angeles may administer pain blocks to relieve severe pain and spasm. Nerve blocks may also be used.    Recommendations:  ?       Reduce/eliminate caffeinated drinks  ?       Reduce high sugar drinks and foods  ?       Get plenty of rest  ?       Practice relaxation techniques; Yoga, Biofeedback, Austen Chi, etc.  ?       Exercise for overall fitness  ?       Eat healthy- High fiber, low fat/cholesterol eating, and change eating habits. We recommend Sugar Busters as a lifestyle change for eating.  ?       See your dentist regularly for checkups and bite checks.  Rehabilitation:  Once the acute pain has been controlled, you should begin exercises to strengthen and rehabilitate the TMJ.     Exercises: These should be performed for five minutes at least five times each day     Slowly open the mouth to its fullest extent, even using the  fingers to exert gentle pressure.     Open and close the mouth as widely and rapidly as possible.     Open and close the mouth against resistance by placing the open palm underneath the chin, or the fingers on top of the chin.     Move the lower jaw side to side without resistance. Later, add resistance by placing both hands on either side of the jaw.     Push (protrude) the lower jaw without resistance. Later add resistance.     Should the above regimen fail to lead to improvement, your physician will discuss with you other forms of therapy. Since almost all types of TMJ pain respond to conservative therapy, surgery is indicated only after exhausting the rehabilitation. Dr. Angeles does not perform rehabilitative surgery on the temporomandibular joint, but refers patients to an oral surgery who specialize in this type of surgery.      CONTACT INFORMATION:  The main office phone number is [phone]. For emergencies after hours and on weekends, this number will convert over to our answering service and the on call provider will answer. Please try to keep non emergent phone calls/ questions to office hours 9am-5pm Monday through Friday.     [facility]  As an alternative, you can sign up and use the Epic MyChart system for more direct and quicker access for non emergent questions/ problems.  [facility] allows you to send messages to your doctor, view your test results, renew your prescriptions, schedule appointments, and more. To sign up, go to [employer] and click on the Sign Up Now link in the New User? box. Enter your [facility] Activation Code exactly as it appears below along with the last four digits of your Social Security Number and your Date of Birth () to complete the sign-up process. If you do not sign up before the expiration date, you must request a new code.    [facility] Activation Code: [email or web address]  Expires: 2022  5:34 AM    If you have questions, you can email  [email or web address] or call [phone] to talk to our MyChart staff. Remember, [facility] is NOT to be used for urgent needs. For medical emergencies, dial 911.

## 2022-01-27 RX ORDER — CYCLOBENZAPRINE HCL 10 MG
10 TABLET ORAL 3 TIMES DAILY PRN
Qty: 270 TABLET | Refills: 1 | Status: CANCELLED | OUTPATIENT
Start: 2022-01-27 | End: 2022-07-26

## 2022-01-27 NOTE — TELEPHONE ENCOUNTER
Patient is needing short refill for cyclobenzaprine sent to [facility] and [facility]

## 2022-01-27 NOTE — TELEPHONE ENCOUNTER
Patient had a 30 day supply called in on 12/28/2021 with zero refills. She picked it up on 12/31/21 per pharmacy. Are you continuing therapy?

## 2022-01-27 NOTE — TELEPHONE ENCOUNTER
CALLED IN TWO WEEK SUPPLY TO [facility], PEND 90 DAY SUPPLY TO YOU.    Gloria Carlson called requesting a refill of the below medication which has been pended for you:     Requested Prescriptions     Pending Prescriptions Disp Refills    cyclobenzaprine (FLEXERIL) 10 MG tablet 270 tablet 1     Sig: Take 1 tablet by mouth 3 times daily as needed for Muscle spasms       Last Appointment Date: 10/18/2021  Next Appointment Date: 2/1/2022    Allergies   Allergen Reactions    Ambien [Zolpidem Tartrate]     Codeine     Lyrica [Pregabalin]     Morphine     Requip [Ropinirole Hcl]     Tizanidine      Terrible nightmares

## 2022-01-28 DIAGNOSIS — M54.2 NECK PAIN: Primary | ICD-10-CM

## 2022-01-28 RX ORDER — CYCLOBENZAPRINE HCL 10 MG
10 TABLET ORAL 3 TIMES DAILY PRN
Qty: 90 TABLET | Refills: 0 | Status: SHIPPED | OUTPATIENT
Start: 2022-01-28 | End: 2022-02-01 | Stop reason: SDUPTHER

## 2022-01-28 NOTE — TELEPHONE ENCOUNTER
Requested Prescriptions     Pending Prescriptions Disp Refills    cyclobenzaprine (FLEXERIL) 10 MG tablet 90 tablet 0     Sig: Take 1 tablet by mouth 3 times daily as needed for Muscle spasms

## 2022-02-01 ENCOUNTER — OFFICE VISIT (OUTPATIENT)
Dept: INTERNAL MEDICINE | Age: 82
End: 2022-02-01
Payer: MEDICARE

## 2022-02-01 VITALS
SYSTOLIC BLOOD PRESSURE: 138 MMHG | OXYGEN SATURATION: 99 % | HEIGHT: 62 IN | HEART RATE: 90 BPM | BODY MASS INDEX: 42.33 KG/M2 | WEIGHT: 230 LBS | DIASTOLIC BLOOD PRESSURE: 80 MMHG

## 2022-02-01 DIAGNOSIS — M54.2 NECK PAIN: ICD-10-CM

## 2022-02-01 DIAGNOSIS — N18.32 STAGE 3B CHRONIC KIDNEY DISEASE (HCC): ICD-10-CM

## 2022-02-01 DIAGNOSIS — R60.0 BILATERAL LOWER EXTREMITY EDEMA: ICD-10-CM

## 2022-02-01 DIAGNOSIS — E55.9 VITAMIN D DEFICIENCY: ICD-10-CM

## 2022-02-01 DIAGNOSIS — I10 PRIMARY HYPERTENSION: Primary | ICD-10-CM

## 2022-02-01 DIAGNOSIS — E78.2 MIXED HYPERLIPIDEMIA: ICD-10-CM

## 2022-02-01 DIAGNOSIS — I10 PRIMARY HYPERTENSION: ICD-10-CM

## 2022-02-01 LAB
ALBUMIN SERPL-MCNC: 3.8 G/DL (ref 3.5–5.2)
ALP BLD-CCNC: 74 U/L (ref 35–104)
ALT SERPL-CCNC: 9 U/L (ref 5–33)
ANION GAP SERPL CALCULATED.3IONS-SCNC: 15 MMOL/L (ref 7–19)
AST SERPL-CCNC: 14 U/L (ref 5–32)
BASOPHILS ABSOLUTE: 0 K/UL (ref 0–0.2)
BASOPHILS RELATIVE PERCENT: 0.3 % (ref 0–1)
BILIRUB SERPL-MCNC: <0.2 MG/DL (ref 0.2–1.2)
BUN BLDV-MCNC: 14 MG/DL (ref 8–23)
CALCIUM SERPL-MCNC: 9.7 MG/DL (ref 8.8–10.2)
CHLORIDE BLD-SCNC: 104 MMOL/L (ref 98–111)
CO2: 24 MMOL/L (ref 22–29)
CREAT SERPL-MCNC: 0.9 MG/DL (ref 0.5–0.9)
EOSINOPHILS ABSOLUTE: 0.3 K/UL (ref 0–0.6)
EOSINOPHILS RELATIVE PERCENT: 4.9 % (ref 0–5)
GFR AFRICAN AMERICAN: >59
GFR NON-AFRICAN AMERICAN: >60
GLUCOSE BLD-MCNC: 106 MG/DL (ref 74–109)
HCT VFR BLD CALC: 37.3 % (ref 37–47)
HEMOGLOBIN: 12.3 G/DL (ref 12–16)
IMMATURE GRANULOCYTES #: 0 K/UL
LYMPHOCYTES ABSOLUTE: 1.3 K/UL (ref 1.1–4.5)
LYMPHOCYTES RELATIVE PERCENT: 22.1 % (ref 20–40)
MCH RBC QN AUTO: 31.3 PG (ref 27–31)
MCHC RBC AUTO-ENTMCNC: 33 G/DL (ref 33–37)
MCV RBC AUTO: 94.9 FL (ref 81–99)
MONOCYTES ABSOLUTE: 0.6 K/UL (ref 0–0.9)
MONOCYTES RELATIVE PERCENT: 9.8 % (ref 0–10)
NEUTROPHILS ABSOLUTE: 3.7 K/UL (ref 1.5–7.5)
NEUTROPHILS RELATIVE PERCENT: 62.6 % (ref 50–65)
PDW BLD-RTO: 12.7 % (ref 11.5–14.5)
PLATELET # BLD: 181 K/UL (ref 130–400)
PMV BLD AUTO: 11.1 FL (ref 9.4–12.3)
POTASSIUM SERPL-SCNC: 4.2 MMOL/L (ref 3.5–5)
RBC # BLD: 3.93 M/UL (ref 4.2–5.4)
SODIUM BLD-SCNC: 143 MMOL/L (ref 136–145)
TOTAL PROTEIN: 6.7 G/DL (ref 6.6–8.7)
WBC # BLD: 5.9 K/UL (ref 4.8–10.8)

## 2022-02-01 PROCEDURE — 1123F ACP DISCUSS/DSCN MKR DOCD: CPT | Performed by: NURSE PRACTITIONER

## 2022-02-01 PROCEDURE — G8400 PT W/DXA NO RESULTS DOC: HCPCS | Performed by: NURSE PRACTITIONER

## 2022-02-01 PROCEDURE — 1036F TOBACCO NON-USER: CPT | Performed by: NURSE PRACTITIONER

## 2022-02-01 PROCEDURE — G8427 DOCREV CUR MEDS BY ELIG CLIN: HCPCS | Performed by: NURSE PRACTITIONER

## 2022-02-01 PROCEDURE — G8484 FLU IMMUNIZE NO ADMIN: HCPCS | Performed by: NURSE PRACTITIONER

## 2022-02-01 PROCEDURE — 99214 OFFICE O/P EST MOD 30 MIN: CPT | Performed by: NURSE PRACTITIONER

## 2022-02-01 PROCEDURE — 4040F PNEUMOC VAC/ADMIN/RCVD: CPT | Performed by: NURSE PRACTITIONER

## 2022-02-01 PROCEDURE — G8417 CALC BMI ABV UP PARAM F/U: HCPCS | Performed by: NURSE PRACTITIONER

## 2022-02-01 PROCEDURE — 1090F PRES/ABSN URINE INCON ASSESS: CPT | Performed by: NURSE PRACTITIONER

## 2022-02-01 RX ORDER — CYCLOBENZAPRINE HCL 10 MG
10 TABLET ORAL 3 TIMES DAILY PRN
Qty: 90 TABLET | Refills: 0 | Status: SHIPPED | OUTPATIENT
Start: 2022-02-01 | End: 2022-02-07 | Stop reason: SDUPTHER

## 2022-02-01 RX ORDER — MIRABEGRON 25 MG/1
50 TABLET, FILM COATED, EXTENDED RELEASE ORAL DAILY
Qty: 90 TABLET | Refills: 1 | Status: SHIPPED | OUTPATIENT
Start: 2022-02-01 | End: 2022-02-07 | Stop reason: SDUPTHER

## 2022-02-01 RX ORDER — DICLOFENAC SODIUM 75 MG/1
75 TABLET, DELAYED RELEASE ORAL 2 TIMES DAILY
Qty: 60 TABLET | Refills: 1 | Status: SHIPPED | OUTPATIENT
Start: 2022-02-01 | End: 2022-02-07 | Stop reason: SDUPTHER

## 2022-02-01 RX ORDER — SUMATRIPTAN 50 MG/1
TABLET, FILM COATED ORAL
Qty: 18 TABLET | Refills: 10 | Status: SHIPPED | OUTPATIENT
Start: 2022-02-01

## 2022-02-01 RX ORDER — IPRATROPIUM BROMIDE 42 UG/1
SPRAY, METERED NASAL 2 TIMES DAILY
COMMUNITY

## 2022-02-01 SDOH — ECONOMIC STABILITY: FOOD INSECURITY: WITHIN THE PAST 12 MONTHS, THE FOOD YOU BOUGHT JUST DIDN'T LAST AND YOU DIDN'T HAVE MONEY TO GET MORE.: PATIENT DECLINED

## 2022-02-01 SDOH — ECONOMIC STABILITY: FOOD INSECURITY: WITHIN THE PAST 12 MONTHS, YOU WORRIED THAT YOUR FOOD WOULD RUN OUT BEFORE YOU GOT MONEY TO BUY MORE.: PATIENT DECLINED

## 2022-02-01 ASSESSMENT — ENCOUNTER SYMPTOMS
WHEEZING: 0
EYE DISCHARGE: 0
BLOOD IN STOOL: 0
STRIDOR: 0
EYE ITCHING: 0
COLOR CHANGE: 0
DIARRHEA: 0
VOMITING: 0
ABDOMINAL DISTENTION: 0
SHORTNESS OF BREATH: 0
SORE THROAT: 0
CHOKING: 0
TROUBLE SWALLOWING: 0
NAUSEA: 0
ABDOMINAL PAIN: 0
CONSTIPATION: 0
COUGH: 0

## 2022-02-01 ASSESSMENT — SOCIAL DETERMINANTS OF HEALTH (SDOH): HOW HARD IS IT FOR YOU TO PAY FOR THE VERY BASICS LIKE FOOD, HOUSING, MEDICAL CARE, AND HEATING?: PATIENT DECLINED

## 2022-02-01 NOTE — ASSESSMENT & PLAN NOTE
If her labs are okay today we will let her know tomorrow but she can maybe start taking half milligram Bumex daily we will just need to closely monitor

## 2022-02-01 NOTE — PROGRESS NOTES
[facility], located within [facility] PHYSICIAN SERVICES  [facility] INTERNAL MEDICINE  [address]  [address] [address]  Dept: [phone]  Dept Fax: [phone]: [phone]    Laurita Allen (:  1940) is a 80 y.o. female,Established patient  with green , here for evaluation of the following chief complaint(s): 3 Month [address] Arm[place] is a 80 y.o. female who presents today for her medical conditions/complaints as noted below. Laurita Allen is c/maame 3 Month Follow-Up        HPI:     Chief Complaint   Patient presents with    3 Month Follow-Up     HPI   1.  ckd ; she takes bumex as you need it; but one day last week she took 2 an it helped get some fluid off her feet and legs; So middles of last week when she took 2 is the last she had    2,  Vit d def; she is on 50,000 weekly she takes as directed no side effects  3. Edema of feet and legs;  Painful and swollen she has chronic venous stasis changes to bilateral lower extremities with right worse than the left  4.   Primary hypertension she is on carvedilol 12.5 twice daily    Past Medical History:   Diagnosis Date    Adenocarcinoma of endometrium, stage 1 ([address] 2017    Anemia     Arthritis     CAD (coronary artery disease)     \"Stiff Valve\"    Chronic kidney disease     Depression     Fibrocystic breast disease     17 biopsy - benign FCBD w/microcalcifications - [facility]     GERD (gastroesophageal reflux disease)     H/O vaginal bleeding     managed Dr Alvares Overall    Hypertension     Hypothyroidism (acquired)     Hypothyroidism (acquired)     Mixed hyperlipidemia     Obesity     Osteoarthritis     Pain of both hip joints 2016    Situational anxiety     Sleep apnea     Vitamin D deficiency       Past Surgical History:   Procedure Laterality Date    BREAST BIOPSY Right     BREAST SURGERY Left     Biopsy, Benign    CYST REMOVAL Left     Shoulder cyst, benign    DILATION AND CURETTAGE OF UTERUS      EYE SURGERY 03/2017    HYSTERECTOMY      Removed uterus and cervix due to cancer, 12/17    LEG SURGERY Left     Tib/Fib ORIF    TONSILLECTOMY         Vitals 2/1/2022 10/18/2021 10/18/2021 10/18/2021 7/13/2021 1/50/7726   SYSTOLIC 997 393 151 112 094 802   DIASTOLIC 80 74 76 74 70 90   Site - - - - - -   Position - - - - - -   Pulse 90 - - 68 - 78   Temp - - - 97.6 - 97.6   Resp - - - - - -   SpO2 99 - - 98 - 96   Weight 230 lb - - - - 230 lb   Height 5' 2\" - - - - 5' 2\"   Body mass index 42.06 kg/m2 - - - - 42.06 kg/m2   Some recent data might be hidden       Family History   Problem Relation Age of Onset    Cancer Mother        Social History     Tobacco Use    Smoking status: Never Smoker    Smokeless tobacco: Never Used   Substance Use Topics    Alcohol use: Yes     Comment: occassionally      Current Outpatient Medications   Medication Sig Dispense Refill    ipratropium (ATROVENT) 0.06 % nasal spray by Each Nostril route 2 times daily      cyclobenzaprine (FLEXERIL) 10 MG tablet Take 1 tablet by mouth 3 times daily as needed for Muscle spasms 90 tablet 0    diclofenac (VOLTAREN) 75 MG EC tablet Take 1 tablet by mouth 2 times daily 60 tablet 1    MYRBETRIQ 25 MG TB24 Take 2 tablets by mouth daily 90 tablet 1    SUMAtriptan (IMITREX) 50 MG tablet TAKE 1 TABLET BY MOUTH TWICE DAILY AS NEEDED FOR MIGRAINE. MAX 2 TABLETS DAILY. 18 tablet 10    donepezil (ARICEPT) 10 MG tablet Take 1 tablet by mouth nightly 90 tablet 1    carvedilol (COREG) 12.5 MG tablet TAKE 1 TABLET TWICE A DAY 60 tablet 5    vitamin D (ERGOCALCIFEROL) 1.25 MG (53294 UT) CAPS capsule TAKE 1 CAPSULE BY MOUTH ONCE A WEEK  12 capsule 2    lansoprazole (PREVACID) 30 MG delayed release capsule TAKE 1 CAPSULE DAILY EVERY MORNING 90 capsule 1    atorvastatin (LIPITOR) 40 MG tablet TAKE 1 TABLET DAILY AS DIRECTED.  90 tablet 3    levothyroxine (SYNTHROID) 50 MCG tablet TAKE 1 TABLET DAILY 90 tablet 3    bumetanide (BUMEX) 1 MG tablet TAKE 1 TABLET DAILY 90 tablet 3    sertraline (ZOLOFT) 25 MG tablet       ALPRAZolam (XANAX) 0.5 MG tablet       venlafaxine (EFFEXOR) 25 MG tablet       butalbital-acetaminophen-caffeine (FIORICET, ESGIC) -40 MG per tablet       Omega-3 Fatty Acids (FISH OIL) 1000 MG CAPS Take 2 capsules by mouth 2 times daily (Patient not taking: Reported on 2/1/2022) 180 capsule 2    loratadine (CLARITIN) 10 MG capsule Take 10 mg by mouth daily (Patient not taking: Reported on 2/1/2022)      FIBER FORMULA PO Take by mouth (Patient not taking: Reported on 2/1/2022)       No current facility-administered medications for this visit.      Allergies   Allergen Reactions    Ambien [Zolpidem Tartrate]     Codeine     Lyrica [Pregabalin]     Morphine     Requip [Ropinirole Hcl]     Tizanidine      Terrible nightmares         Health Maintenance   Topic Date Due    DEXA (modify frequency per FRAX score)  Never done    Breast cancer screen  08/30/2021    COVID-19 Vaccine (3 - Booster for Moderna series) 09/05/2021    DTaP/Tdap/Td vaccine (1 - Tdap) 02/22/2022 (Originally 9/19/1959)    Shingles Vaccine (3 of 3) 04/05/2022 (Originally 12/5/2019)    Lipid screen  04/02/2022    Depression Screen  07/13/2022    Annual Wellness Visit (AWV)  07/14/2022    TSH testing  10/15/2022    Potassium monitoring  10/15/2022    Creatinine monitoring  10/15/2022    Flu vaccine  Completed    Pneumococcal 65+ years Vaccine  Completed    Hepatitis C screen  Completed    Hepatitis A vaccine  Aged Out    Hepatitis B vaccine  Aged Out    Hib vaccine  Aged Out    Meningococcal (ACWY) vaccine  Aged Out       No results found for: LABA1C  No results found for: PSA, PSADIA  TSH   Date Value Ref Range Status   10/15/2021 4.470 (H) 0.270 - 4.200 uIU/mL Final   ]  Lab Results   Component Value Date     10/15/2021    K 4.1 10/15/2021     10/15/2021    CO2 23 10/15/2021    BUN 23 10/15/2021    CREATININE 0.9 10/15/2021    GLUCOSE 108 polyuria. Genitourinary: Negative for difficulty urinating, dysuria, frequency and urgency. Musculoskeletal: Negative for arthralgias and gait problem. Skin: Negative for color change and rash. Allergic/Immunologic: Negative for food allergies and immunocompromised state. Neurological: Negative for dizziness, tremors, syncope, speech difficulty, weakness and headaches. Hematological: Negative for adenopathy. Does not bruise/bleed easily. Psychiatric/Behavioral: Negative for confusion and hallucinations. Objective:     Physical Exam  Constitutional:       General: She is not in acute distress. Appearance: She is well-developed. HENT:      Head: Normocephalic and atraumatic. Eyes:      General: No scleral icterus. Right eye: No discharge. Left eye: No discharge. Pupils: Pupils are equal, round, and reactive to light. Neck:      Thyroid: No thyromegaly. Vascular: No JVD. Cardiovascular:      Rate and Rhythm: Normal rate and regular rhythm. Heart sounds: Normal heart sounds. No murmur heard. Pulmonary:      Effort: Pulmonary effort is normal. No respiratory distress. Breath sounds: Normal breath sounds. No wheezing or rales. Abdominal:      General: Bowel sounds are normal. There is no distension. Palpations: Abdomen is soft. There is no mass. Tenderness: There is no abdominal tenderness. There is no guarding or rebound. Musculoskeletal:         General: No tenderness. Normal range of motion. Cervical back: Normal range of motion and neck supple. Skin:     General: Skin is warm and dry. Findings: No erythema or rash. Neurological:      Mental Status: She is alert and oriented to person, place, and time. Cranial Nerves: No cranial nerve deficit. Coordination: Coordination normal.      Deep Tendon Reflexes: Reflexes are normal and symmetric.  Reflexes normal.   Psychiatric:         Mood and Affect: Mood is not depressed. Behavior: Behavior normal.         Thought Content: Thought content normal.         Judgment: Judgment normal.       /80   Pulse 90   Ht 5' 2\" (1.575 m)   Wt 230 lb (104.3 kg)   SpO2 99%   BMI 42.07 kg/m²           Assessment:      Problem List     Bilateral lower extremity edema     If her labs are okay today we will let her know tomorrow but she can maybe start taking half milligram Bumex daily we will just need to closely monitor          Hypertension - Primary    Relevant Orders    CBC Auto Differential    Comprehensive Metabolic Panel    CBC Auto Differential    Comprehensive Metabolic Panel    Urinalysis Reflex to Culture    TSH without Reflex    Mixed hyperlipidemia    Relevant Medications    atorvastatin (LIPITOR) 40 MG tablet    bumetanide (BUMEX) 1 MG tablet    carvedilol (COREG) 12.5 MG tablet    Other Relevant Orders    Lipid Panel    Neck pain    Relevant Medications    cyclobenzaprine (FLEXERIL) 10 MG tablet    Stage 3 chronic kidney disease (HCC)     We are monitoring closely with impaired renal function and we have been trying to withhold as much of her diuretics if we can to preserve her kidney function          Vitamin D deficiency     She is on 50,006 weekly no side effects          Relevant Orders    Vitamin D 25 Hydroxy          Plan:        Patient given educational materials - see patient instructions. Discussed use, benefit, and side effects of prescribed medications. Allpatient questions answered. Pt voiced understanding. Reviewed health maintenance. Instructed to continue current medications, diet and exercise. Patient agreed with treatment plan. Follow up as directed.    MEDICATIONS:  Orders Placed This Encounter   Medications    cyclobenzaprine (FLEXERIL) 10 MG tablet     Sig: Take 1 tablet by mouth 3 times daily as needed for Muscle spasms     Dispense:  90 tablet     Refill:  0    diclofenac (VOLTAREN) 75 MG EC tablet     Sig: Take 1 tablet by mouth 2 times daily     Dispense:  60 tablet     Refill:  1    MYRBETRIQ 25 MG TB24     Sig: Take 2 tablets by mouth daily     Dispense:  90 tablet     Refill:  1    SUMAtriptan (IMITREX) 50 MG tablet     Sig: TAKE 1 TABLET BY MOUTH TWICE DAILY AS NEEDED FOR MIGRAINE. MAX 2 TABLETS DAILY. Dispense:  18 tablet     Refill:  10         ORDERS:  Orders Placed This Encounter   Procedures    CBC Auto Differential    Comprehensive Metabolic Panel    CBC Auto Differential    Comprehensive Metabolic Panel    Lipid Panel    Vitamin D 25 Hydroxy    Urinalysis Reflex to Culture    TSH without Reflex       Follow-up:  Return in about 3 months (around 5/1/2022). PATIENT INSTRUCTIONS:  Patient Instructions   1.  CKD; We will call you in the am to see if you can take 1/2 of the bumex 1 mg pill you have at home routinely; then boost occasionally   #2 vitamin D deficiency stable with 50,000 weekly  3 edema feet and legs will get labs today and hopefully be able to use just like 1/2 mg of Bumex on every day basis  4. Primary hypertension stable carvedilol blood pressure is good    Electronically signed by TAMIA English on 2/1/2022 at 3:51 PM    @On this date 2/1/2022 I have spent 25 minutes reviewing previous notes, test results and face to face with the patient discussing the diagnosis and importance of compliance with the treatment plan as well as documenting on the day of the visit. [employer]/transcription disclaimer:  Much of this encounter note is electronic transcription/translation of spoken language to printed texts. The electronic translation of spoken language may be erroneous, or at times,nonsensical words or phrases may be inadvertently transcribed.   Although I have reviewed the note for such errors, some may still exist.

## 2022-02-01 NOTE — PATIENT INSTRUCTIONS
1.  CKD; We will call you in the am to see if you can take 1/2 of the bumex 1 mg pill you have at home routinely; then boost occasionally   #2 vitamin D deficiency stable with 50,000 weekly  3 edema feet and legs will get labs today and hopefully be able to use just like 1/2 mg of Bumex on every day basis  4.   Primary hypertension stable carvedilol blood pressure is good

## 2022-02-01 NOTE — ASSESSMENT & PLAN NOTE
We are monitoring closely with impaired renal function and we have been trying to withhold as much of her diuretics if we can to preserve her kidney function

## 2022-02-07 DIAGNOSIS — M54.2 NECK PAIN: ICD-10-CM

## 2022-02-07 RX ORDER — MIRABEGRON 25 MG/1
50 TABLET, FILM COATED, EXTENDED RELEASE ORAL DAILY
Qty: 90 TABLET | Refills: 1 | Status: SHIPPED | OUTPATIENT
Start: 2022-02-07 | End: 2022-05-08

## 2022-02-07 RX ORDER — DICLOFENAC SODIUM 75 MG/1
75 TABLET, DELAYED RELEASE ORAL 2 TIMES DAILY
Qty: 60 TABLET | Refills: 1 | Status: SHIPPED | OUTPATIENT
Start: 2022-02-07 | End: 2022-03-14 | Stop reason: SDUPTHER

## 2022-02-07 RX ORDER — CYCLOBENZAPRINE HCL 10 MG
10 TABLET ORAL 3 TIMES DAILY PRN
Qty: 90 TABLET | Refills: 0 | Status: SHIPPED | OUTPATIENT
Start: 2022-02-07 | End: 2022-04-04 | Stop reason: SDUPTHER

## 2022-02-07 NOTE — TELEPHONE ENCOUNTER
Glory Kelly called requesting a refill of the below medication which has been pended for you:     Requested Prescriptions     Pending Prescriptions Disp Refills    cyclobenzaprine (FLEXERIL) 10 MG tablet 90 tablet 0     Sig: Take 1 tablet by mouth 3 times daily as needed for Muscle spasms    diclofenac (VOLTAREN) 75 MG EC tablet 60 tablet 1     Sig: Take 1 tablet by mouth 2 times daily    MYRBETRIQ 25 MG TB24 90 tablet 1     Sig: Take 2 tablets by mouth daily       Last Appointment Date: 2/1/2022  Next Appointment Date: 5/2/2022    Allergies   Allergen Reactions    Ambien [Zolpidem Tartrate]     Codeine     Lyrica [Pregabalin]     Morphine     Requip [Ropinirole Hcl]     Tizanidine      Terrible nightmares

## 2022-02-08 ENCOUNTER — APPOINTMENT (OUTPATIENT)
Dept: CT IMAGING | Facility: HOSPITAL | Age: 82
End: 2022-02-08

## 2022-02-08 ENCOUNTER — OFFICE VISIT (OUTPATIENT)
Dept: OBSTETRICS AND GYNECOLOGY | Facility: CLINIC | Age: 82
End: 2022-02-08

## 2022-02-08 ENCOUNTER — HOSPITAL ENCOUNTER (EMERGENCY)
Facility: HOSPITAL | Age: 82
Discharge: HOME | End: 2022-02-09
Attending: STUDENT IN AN ORGANIZED HEALTH CARE EDUCATION/TRAINING PROGRAM | Admitting: STUDENT IN AN ORGANIZED HEALTH CARE EDUCATION/TRAINING PROGRAM

## 2022-02-08 ENCOUNTER — APPOINTMENT (OUTPATIENT)
Dept: ULTRASOUND IMAGING | Facility: HOSPITAL | Age: 82
End: 2022-02-08

## 2022-02-08 ENCOUNTER — APPOINTMENT (OUTPATIENT)
Dept: GENERAL RADIOLOGY | Facility: HOSPITAL | Age: 82
End: 2022-02-08

## 2022-02-08 VITALS
HEIGHT: 66 IN | WEIGHT: 230 LBS | BODY MASS INDEX: 36.96 KG/M2 | SYSTOLIC BLOOD PRESSURE: 136 MMHG | DIASTOLIC BLOOD PRESSURE: 78 MMHG

## 2022-02-08 DIAGNOSIS — I35.0 AORTIC VALVE STENOSIS, ETIOLOGY OF CARDIAC VALVE DISEASE UNSPECIFIED: ICD-10-CM

## 2022-02-08 DIAGNOSIS — I87.2 VENOUS INSUFFICIENCY: ICD-10-CM

## 2022-02-08 DIAGNOSIS — R53.1 WEAKNESS: ICD-10-CM

## 2022-02-08 DIAGNOSIS — F33.1 MODERATE EPISODE OF RECURRENT MAJOR DEPRESSIVE DISORDER: ICD-10-CM

## 2022-02-08 DIAGNOSIS — I10 ESSENTIAL HYPERTENSION: Primary | ICD-10-CM

## 2022-02-08 DIAGNOSIS — F51.01 PRIMARY INSOMNIA: ICD-10-CM

## 2022-02-08 DIAGNOSIS — L03.90 CELLULITIS, UNSPECIFIED CELLULITIS SITE: ICD-10-CM

## 2022-02-08 DIAGNOSIS — C54.1 ADENOCARCINOMA OF ENDOMETRIUM: ICD-10-CM

## 2022-02-08 DIAGNOSIS — F41.9 ANXIETY: ICD-10-CM

## 2022-02-08 DIAGNOSIS — Z78.0 POSTMENOPAUSAL: ICD-10-CM

## 2022-02-08 DIAGNOSIS — R10.84 GENERALIZED ABDOMINAL PAIN: Primary | ICD-10-CM

## 2022-02-08 LAB
ALBUMIN SERPL-MCNC: 3.8 G/DL (ref 3.5–5.2)
ALBUMIN/GLOB SERPL: 1.4 G/DL
ALP SERPL-CCNC: 75 U/L (ref 39–117)
ALT SERPL W P-5'-P-CCNC: 13 U/L (ref 1–33)
ANION GAP SERPL CALCULATED.3IONS-SCNC: 13 MMOL/L (ref 5–15)
AST SERPL-CCNC: 25 U/L (ref 1–32)
BACTERIA UR QL AUTO: ABNORMAL /HPF
BASOPHILS # BLD AUTO: 0.02 10*3/MM3 (ref 0–0.2)
BASOPHILS NFR BLD AUTO: 0.2 % (ref 0–1.5)
BILIRUB SERPL-MCNC: 0.4 MG/DL (ref 0–1.2)
BILIRUB UR QL STRIP: NEGATIVE
BUN SERPL-MCNC: 27 MG/DL (ref 8–23)
BUN/CREAT SERPL: 24.5 (ref 7–25)
CALCIUM SPEC-SCNC: 9.8 MG/DL (ref 8.6–10.5)
CHLORIDE SERPL-SCNC: 104 MMOL/L (ref 98–107)
CLARITY UR: CLEAR
CO2 SERPL-SCNC: 22 MMOL/L (ref 22–29)
COLOR UR: YELLOW
CREAT SERPL-MCNC: 1.1 MG/DL (ref 0.57–1)
CRP SERPL-MCNC: 2.86 MG/DL (ref 0–0.5)
D-LACTATE SERPL-SCNC: 2.5 MMOL/L (ref 0.5–2)
DEPRECATED RDW RBC AUTO: 42 FL (ref 37–54)
EOSINOPHIL # BLD AUTO: 0.02 10*3/MM3 (ref 0–0.4)
EOSINOPHIL NFR BLD AUTO: 0.2 % (ref 0.3–6.2)
ERYTHROCYTE [DISTWIDTH] IN BLOOD BY AUTOMATED COUNT: 12.8 % (ref 12.3–15.4)
GFR SERPL CREATININE-BSD FRML MDRD: 48 ML/MIN/1.73
GLOBULIN UR ELPH-MCNC: 2.8 GM/DL
GLUCOSE SERPL-MCNC: 135 MG/DL (ref 65–99)
GLUCOSE UR STRIP-MCNC: NEGATIVE MG/DL
HCT VFR BLD AUTO: 35.9 % (ref 34–46.6)
HGB BLD-MCNC: 11.8 G/DL (ref 12–15.9)
HGB UR QL STRIP.AUTO: ABNORMAL
HOLD SPECIMEN: NORMAL
HYALINE CASTS UR QL AUTO: ABNORMAL /LPF
IMM GRANULOCYTES # BLD AUTO: 0.08 10*3/MM3 (ref 0–0.05)
IMM GRANULOCYTES NFR BLD AUTO: 0.6 % (ref 0–0.5)
INR PPP: 1.27 (ref 0.91–1.09)
KETONES UR QL STRIP: ABNORMAL
LEUKOCYTE ESTERASE UR QL STRIP.AUTO: ABNORMAL
LIPASE SERPL-CCNC: 17 U/L (ref 13–60)
LYMPHOCYTES # BLD AUTO: 0.71 10*3/MM3 (ref 0.7–3.1)
LYMPHOCYTES NFR BLD AUTO: 5.5 % (ref 19.6–45.3)
MCH RBC QN AUTO: 30.1 PG (ref 26.6–33)
MCHC RBC AUTO-ENTMCNC: 32.9 G/DL (ref 31.5–35.7)
MCV RBC AUTO: 91.6 FL (ref 79–97)
MONOCYTES # BLD AUTO: 1.15 10*3/MM3 (ref 0.1–0.9)
MONOCYTES NFR BLD AUTO: 8.8 % (ref 5–12)
NEUTROPHILS NFR BLD AUTO: 11.02 10*3/MM3 (ref 1.7–7)
NEUTROPHILS NFR BLD AUTO: 84.7 % (ref 42.7–76)
NITRITE UR QL STRIP: NEGATIVE
NRBC BLD AUTO-RTO: 0 /100 WBC (ref 0–0.2)
NT-PROBNP SERPL-MCNC: 621.2 PG/ML (ref 0–1800)
PH UR STRIP.AUTO: 5.5 [PH] (ref 5–8)
PLATELET # BLD AUTO: 160 10*3/MM3 (ref 140–450)
PMV BLD AUTO: 11.3 FL (ref 6–12)
POTASSIUM SERPL-SCNC: 3.8 MMOL/L (ref 3.5–5.2)
PROCALCITONIN SERPL-MCNC: 0.77 NG/ML (ref 0–0.25)
PROT SERPL-MCNC: 6.6 G/DL (ref 6–8.5)
PROT UR QL STRIP: NEGATIVE
PROTHROMBIN TIME: 15.4 SECONDS (ref 11.9–14.6)
RBC # BLD AUTO: 3.92 10*6/MM3 (ref 3.77–5.28)
RBC # UR STRIP: ABNORMAL /HPF
REF LAB TEST METHOD: ABNORMAL
SARS-COV-2 RNA PNL SPEC NAA+PROBE: NOT DETECTED
SODIUM SERPL-SCNC: 139 MMOL/L (ref 136–145)
SP GR UR STRIP: 1.03 (ref 1–1.03)
SQUAMOUS #/AREA URNS HPF: ABNORMAL /HPF
TROPONIN T SERPL-MCNC: <0.01 NG/ML (ref 0–0.03)
UROBILINOGEN UR QL STRIP: ABNORMAL
WBC # UR STRIP: ABNORMAL /HPF
WBC NRBC COR # BLD: 13 10*3/MM3 (ref 3.4–10.8)
WHOLE BLOOD HOLD SPECIMEN: NORMAL
WHOLE BLOOD HOLD SPECIMEN: NORMAL

## 2022-02-08 PROCEDURE — 70450 CT HEAD/BRAIN W/O DYE: CPT

## 2022-02-08 PROCEDURE — 80053 COMPREHEN METABOLIC PANEL: CPT | Performed by: STUDENT IN AN ORGANIZED HEALTH CARE EDUCATION/TRAINING PROGRAM

## 2022-02-08 PROCEDURE — 83690 ASSAY OF LIPASE: CPT | Performed by: STUDENT IN AN ORGANIZED HEALTH CARE EDUCATION/TRAINING PROGRAM

## 2022-02-08 PROCEDURE — 83880 ASSAY OF NATRIURETIC PEPTIDE: CPT | Performed by: NURSE PRACTITIONER

## 2022-02-08 PROCEDURE — 83605 ASSAY OF LACTIC ACID: CPT | Performed by: NURSE PRACTITIONER

## 2022-02-08 PROCEDURE — 85025 COMPLETE CBC W/AUTO DIFF WBC: CPT | Performed by: STUDENT IN AN ORGANIZED HEALTH CARE EDUCATION/TRAINING PROGRAM

## 2022-02-08 PROCEDURE — 87040 BLOOD CULTURE FOR BACTERIA: CPT | Performed by: NURSE PRACTITIONER

## 2022-02-08 PROCEDURE — 81001 URINALYSIS AUTO W/SCOPE: CPT | Performed by: STUDENT IN AN ORGANIZED HEALTH CARE EDUCATION/TRAINING PROGRAM

## 2022-02-08 PROCEDURE — 87635 SARS-COV-2 COVID-19 AMP PRB: CPT

## 2022-02-08 PROCEDURE — 74177 CT ABD & PELVIS W/CONTRAST: CPT

## 2022-02-08 PROCEDURE — 84145 PROCALCITONIN (PCT): CPT | Performed by: NURSE PRACTITIONER

## 2022-02-08 PROCEDURE — 84484 ASSAY OF TROPONIN QUANT: CPT | Performed by: NURSE PRACTITIONER

## 2022-02-08 PROCEDURE — 93005 ELECTROCARDIOGRAM TRACING: CPT

## 2022-02-08 PROCEDURE — 93010 ELECTROCARDIOGRAM REPORT: CPT | Performed by: INTERNAL MEDICINE

## 2022-02-08 PROCEDURE — 93970 EXTREMITY STUDY: CPT

## 2022-02-08 PROCEDURE — 93970 EXTREMITY STUDY: CPT | Performed by: SURGERY

## 2022-02-08 PROCEDURE — 87040 BLOOD CULTURE FOR BACTERIA: CPT | Performed by: STUDENT IN AN ORGANIZED HEALTH CARE EDUCATION/TRAINING PROGRAM

## 2022-02-08 PROCEDURE — 71045 X-RAY EXAM CHEST 1 VIEW: CPT

## 2022-02-08 PROCEDURE — 99284 EMERGENCY DEPT VISIT MOD MDM: CPT

## 2022-02-08 PROCEDURE — 93005 ELECTROCARDIOGRAM TRACING: CPT | Performed by: STUDENT IN AN ORGANIZED HEALTH CARE EDUCATION/TRAINING PROGRAM

## 2022-02-08 PROCEDURE — 99214 OFFICE O/P EST MOD 30 MIN: CPT | Performed by: OBSTETRICS & GYNECOLOGY

## 2022-02-08 PROCEDURE — 85610 PROTHROMBIN TIME: CPT | Performed by: NURSE PRACTITIONER

## 2022-02-08 PROCEDURE — 86140 C-REACTIVE PROTEIN: CPT | Performed by: NURSE PRACTITIONER

## 2022-02-08 RX ORDER — SODIUM CHLORIDE 0.9 % (FLUSH) 0.9 %
10 SYRINGE (ML) INJECTION AS NEEDED
Status: DISCONTINUED | OUTPATIENT
Start: 2022-02-08 | End: 2022-02-09 | Stop reason: HOSPADM

## 2022-02-08 RX ORDER — VENLAFAXINE 25 MG/1
25 TABLET ORAL DAILY
Qty: 90 TABLET | Refills: 3 | Status: SHIPPED | OUTPATIENT
Start: 2022-02-08 | End: 2022-05-10 | Stop reason: SDUPTHER

## 2022-02-08 RX ORDER — ALPRAZOLAM 1 MG/1
1 TABLET ORAL 3 TIMES DAILY PRN
Qty: 90 TABLET | Refills: 2 | Status: SHIPPED | OUTPATIENT
Start: 2022-02-08 | End: 2022-05-10 | Stop reason: SDUPTHER

## 2022-02-08 RX ORDER — CEPHALEXIN 500 MG/1
500 CAPSULE ORAL 3 TIMES DAILY
Qty: 30 CAPSULE | Refills: 0 | Status: SHIPPED | OUTPATIENT
Start: 2022-02-08 | End: 2022-02-18

## 2022-02-08 RX ORDER — SERTRALINE HYDROCHLORIDE 25 MG/1
25 TABLET, FILM COATED ORAL DAILY
Qty: 90 TABLET | Refills: 3 | Status: SHIPPED | OUTPATIENT
Start: 2022-02-08 | End: 2022-05-10 | Stop reason: SDUPTHER

## 2022-02-08 RX ADMIN — IOPAMIDOL 100 ML: 612 INJECTION, SOLUTION INTRAVENOUS at 23:59

## 2022-02-08 RX ADMIN — SODIUM CHLORIDE, POTASSIUM CHLORIDE, SODIUM LACTATE AND CALCIUM CHLORIDE 1000 ML: 600; 310; 30; 20 INJECTION, SOLUTION INTRAVENOUS at 22:32

## 2022-02-08 NOTE — PROGRESS NOTES
"Subjective   Chief Complaint   Patient presents with   • Follow-up     Patient here for refills of Xanax, Effexor and Zoloft. Patient states she is doing well with her current dosage and would like to remain on them.      Jennifer Gomes is a 81 y.o. year old .  No LMP recorded (lmp unknown). Patient has had a hysterectomy.  She presents to be seen for follow-up of anxiety.  Patient accompanied by her daughter today.  She reports that her anxiety and depression are both well-controlled with zoloft and xanax; Jennifer reports it is \"about the same\" as her last visit.  Patient is very pleased with her medications.    Patient also c/o increased swelling in feet/legs recently and has been trying to improve that by taking extra diuretics at home to help improve that. Patient asked about whether she has ever worn compression socks to help with swelling - she has been advised to apply ace bandages, but has not yet done so.     The following portions of the patient's history were reviewed and updated as appropriate:current medications and allergies    Social History    Tobacco Use      Smoking status: Never Smoker      Smokeless tobacco: Never Used    Review of Systems   Constitutional: Positive for unexpected weight change (gained about 35 pounds last year, but weight now stable). Negative for activity change.   Respiratory: Negative for shortness of breath.    Cardiovascular: Negative for chest pain.   Gastrointestinal: Positive for abdominal pain (mild, only for past 24 hours) and constipation.        + fecal incontinence   Genitourinary: Positive for enuresis. Negative for difficulty urinating.   Musculoskeletal: Positive for arthralgias and back pain.   Psychiatric/Behavioral: Positive for dysphoric mood (stable on zoloft, although chronically depressed due to pain and difficulty ambulating now) and sleep disturbance (significant.  Pt takes 3 mg of xanax most nights, divided into two doses, in order to sleep). The " "patient is nervous/anxious (pt takes xanax prn during the day, but reports that is rare).          Objective   /78   Ht 167.6 cm (66\")   Wt 104 kg (230 lb)   LMP  (LMP Unknown)   Breastfeeding No   BMI 37.12 kg/m²     Physical Exam  Vitals and nursing note reviewed.   Constitutional:       General: She is not in acute distress.     Appearance: She is well-developed. She is obese.   HENT:      Head: Normocephalic and atraumatic.   Neck:      Thyroid: No thyromegaly.   Pulmonary:      Effort: Pulmonary effort is normal.   Abdominal:      General: There is no distension.      Palpations: Abdomen is soft.      Tenderness: There is no abdominal tenderness.   Musculoskeletal:      Cervical back: Normal range of motion.      Comments: Patient in wheelchair today   Skin:     General: Skin is warm and dry.      Comments: Induration and erythema consistent with cellulitis just above both ankles.  Skin was small water blisters, but intact.  No oozing or drainage.   Neurological:      Mental Status: She is alert and oriented to person, place, and time.   Psychiatric:         Behavior: Behavior normal.         Judgment: Judgment normal.         Lab Review   No data reviewed    Imaging   No data reviewed     Assessment & Plan    Diagnoses and all orders for this visit:    1. Anxiety: Takes 3 mg of Xanax every evening to get sleep.  She divides this into 2 doses that are 3 to 4 hours apart.  Patient will occasionally take half of a tablet during the day for anxiety, but rarely has any \"extra\" because she takes it all at night.  Patient has previously asked for increases in her 24 hour dosage, but I have declined to increase her dosage.  We have again reviewed how habit-forming this medication is.  We have often discussed how dangerous it is for her to have this medication in the house since her daughter is a recovering addict - although her daughter prefers alcohol over drugs.  Refills of xanax, effexor and zoloft all " "sent to pharmacy.  -     venlafaxine (EFFEXOR) 25 MG tablet; Take 1 tablet by mouth Daily.  Dispense: 90 tablet; Refill: 3  -     ALPRAZolam (XANAX) 1 MG tablet; Take 1 tablet by mouth 3 (Three) Times a Day As Needed for Anxiety.  Dispense: 90 tablet; Refill: 2    2. Moderate episode of recurrent major depressive disorder (HCC)  -     sertraline (ZOLOFT) 25 MG tablet; Take 1 tablet by mouth Daily.  Dispense: 90 tablet; Refill: 3    3. Morbidly obese (HCC): Patient unable to exercise due to difficulty ambulating    4. Postmenopausal    5. Primary insomnia  -     ALPRAZolam (XANAX) 1 MG tablet; Take 1 tablet by mouth 3 (Three) Times a Day As Needed for Anxiety.  Dispense: 90 tablet; Refill: 2  Diagnoses and all orders for this visit:    6. Essential hypertension (Primary)  -     Ambulatory Referral to Cardiology    7. Venous insufficiency  -     Ambulatory Referral to Cardiology    8. Aortic valve stenosis, etiology of cardiac valve disease unspecified  -     Ambulatory Referral to Cardiology    10. Adenocarcinoma of endometrium (HCC)  Comments:  s/p hysterectomy wtih Numnum in 2017    11. Cellulitis, unspecified cellulitis site: Superficial cellulitis with induration and erythema, of dependent edema just above ankles -bilaterally.  Skin currently intact, but patient says that sometimes it will \"weep\".  Antibiotics today.  Referral has been made to cardiology.  Patient advised to take medications as directed, until she is seen cardiology  -     cephalexin (Keflex) 500 MG capsule; Take 1 capsule by mouth 3 (Three) Times a Day for 10 days.  Dispense: 30 capsule; Refill: 0        This note was electronically signed.    Shasta Madrigal MD  February 8, 2022  14:08 CST    Total time spent today with Jennifer  was 20-29 minutes (level 3).  Greater than 50% of the time was spent coordinating care, answering her questions and counseling regarding pathophysiology of her presenting problem along with plans for any diagnositc " work-up and treatment.

## 2022-02-09 VITALS
TEMPERATURE: 98.7 F | DIASTOLIC BLOOD PRESSURE: 91 MMHG | WEIGHT: 232 LBS | BODY MASS INDEX: 42.69 KG/M2 | SYSTOLIC BLOOD PRESSURE: 134 MMHG | HEART RATE: 102 BPM | HEIGHT: 62 IN | RESPIRATION RATE: 20 BRPM | OXYGEN SATURATION: 97 %

## 2022-02-09 LAB — HOLD SPECIMEN: NORMAL

## 2022-02-09 PROCEDURE — 96372 THER/PROPH/DIAG INJ SC/IM: CPT

## 2022-02-09 PROCEDURE — 25010000002 DROPERIDOL PER 5 MG: Performed by: STUDENT IN AN ORGANIZED HEALTH CARE EDUCATION/TRAINING PROGRAM

## 2022-02-09 PROCEDURE — 25010000002 IOPAMIDOL 61 % SOLUTION: Performed by: NURSE PRACTITIONER

## 2022-02-09 RX ORDER — FUROSEMIDE 40 MG/1
40 TABLET ORAL ONCE
Status: DISCONTINUED | OUTPATIENT
Start: 2022-02-09 | End: 2022-02-09 | Stop reason: HOSPADM

## 2022-02-09 RX ORDER — DROPERIDOL 2.5 MG/ML
5 INJECTION, SOLUTION INTRAMUSCULAR; INTRAVENOUS ONCE
Status: COMPLETED | OUTPATIENT
Start: 2022-02-09 | End: 2022-02-09

## 2022-02-09 RX ADMIN — DROPERIDOL 5 MG: 2.5 INJECTION, SOLUTION INTRAMUSCULAR; INTRAVENOUS at 02:23

## 2022-02-09 NOTE — ED NOTES
MD and RN at  to assess pt. MD verbalized to this RN to wait and not stick pt for repeat lactic acid lab draw as pt insistent on dc and appears to be at baseline cognitively.     Morenita Bonilla, RN  02/09/22 3613

## 2022-02-09 NOTE — ED NOTES
Warm blanket provided for comfort. Droperidol given per order. Pt initially upset but reminded pt that she had said earlier she didn't want an IV. Educated this will help with her pain and help her to relax. Pt tolerated well. Will monitor pt's altered status and rck back to attempt lab draw/IV placement per orders.      Morenita Bonilla RN  02/09/22 0228       Morenita Bonilla RN  02/09/22 0229

## 2022-02-09 NOTE — ED NOTES
MD into room to talk with pt's dtr regarding pt's visit. Pt put on pants that her dtr brought by herself as well as transferred herself from chair to BSC and back to chair again. Pt put her shoes on by herself. Assisted into wc and took pt out via wc to dtr's vehicle.      Morenita Bonilla RN  02/09/22 0520

## 2022-02-09 NOTE — DISCHARGE INSTRUCTIONS
You were evaluated in the ER for abdominal pain and weakness. Your workup showed no indication at this time for admission to the hospital. Please use your home medications for symptomatic improvement. Like we discussed, please return to the ER if you have any other concerns or problems.    It is VERY IMPORTANT that you follow up (call them to set up an appointment) with your primary care doctor* within the next few days or as soon as possible so that you can be re-evaluated for improvement in your symptoms or for any other questions. If you were prescribed any medications, please take them as directed or call us back with any questions.     Return to the ER within 24-48 hours if you have any new symptoms, worsening symptoms, or any other concerns.    _____  *If you do not have a primary care doctor, follow up with one of the [facility] physician groups below to setup primary care.     If you have trouble making an appointment, please call the [facility] Nurse Line at [phone]    Dr. Merary Iqbal DO, Dr. Ancelmo Queen DO, and MORGAN Branham  [facility]  [address] [place] [address], [address] [phone]    Dr. Jesse Mancia MD  [facility] - [address] [address], [address], [place] [address] [phone]    Dr. Eleazar Galvan DO, Dr. Rogers Garrett DO,  MORGAN Morton, and MORGAN Nolan  [facility] - [address] [address], [address], [place], [place] [id number] [phone]     Dr. Cassidy Godoy MD, and MORGAN Tucker  [facility] - [address] , [place] [address] [phone]    Dr. Ashvin Posadas MD and Dr. oB Dash MD  [facility] [address], [address] [phone]    Dr. Grant Awan MD  [facility]  [facility] - [address], [address], [place], [place], [address] [phone]    Dr. Juan Howell MD  [address], [address] [phone]

## 2022-02-09 NOTE — ED NOTES
"Pt noted to be off the monitor. Upon entry into pt's room, pt found to be undressed, IV pulled out and draining onto the bed, all monitoring equipment removed. Pt stated she did not want anything done and didn't need to be here. RN obtained help from brandon Jara to get pt's legs out of the side rails and positioned so she could get out of the bed and onto a BSC as pt told this RN she needed to void. New gown placed on pt after cleaning up IV site and other areas of her body she had smeared blood r/t self removal of the IV. While on BSC bed linens changed. Pt then assisted back up into bed by this RN and brandon Jara. Pt began yelling \"I don't want no IVs, Don't you stick anything in me\". RN and tech both informed pt that she was being placed back on the monitor to watch her heart and breathing. Enc pt to rest. Warm blanket applied. Lights dimmed. Attempted to talk to pt about need for IV access however pt not at point to understand this. Pt able to answer all questions asked and A&Ox4 however continues to be minimally altered/forgetful/agitated. Will continue to monitor.     The following fall interventions were initiated:    [x] Patient and/or family given education   [x] Call light within reach and educated on how to use   [x] Bed rails up per protocol    [x] Bed locked and in the lowest position   [x] Bed alarm set and on loudest setting   [x] Fall wrist band applied   [x] Non skid footwear applied   [x] Room free of clutter   [x] Patient items within reach   [x] Adequate lighting provided  [] Falls sign present    [] Patient moved closer to nursing station   [] Restraints applied        Morenita Bonilla RN  02/09/22 0254    "

## 2022-02-09 NOTE — ED PROVIDER NOTES
Subjective   81 yof with PMH of HTN, depression, hyperlipidemia, PUD, thyroid disease, fibromyalgia, chronic pain, venous insufficiency, aortic stenosis and uterine cancer presents via EMS with c/o AMS,  lower abdomen pain and bilateral lower leg pain and swelling.  She is c/o pain behind her knees. Per Ems, family states three hours PTA she was noted to be weak and confused. They state she is normally alert and oriented and can ambulate.  No documented fever.  No CP or SOB.  She is on multiple medications that includes xanax, flexeril, oxycodone and effexor.  She is also prescribed aricept.                 Review of Systems   Constitutional: Negative for activity change, appetite change, fatigue and fever.   HENT: Negative for congestion, ear pain, facial swelling and sore throat.    Eyes: Negative for discharge and visual disturbance.   Respiratory: Negative for apnea, chest tightness, shortness of breath, wheezing and stridor.    Cardiovascular: Positive for leg swelling. Negative for chest pain and palpitations.   Gastrointestinal: Positive for abdominal pain. Negative for abdominal distention, diarrhea, nausea and vomiting.   Genitourinary: Negative for difficulty urinating and dysuria.   Musculoskeletal: Negative for arthralgias and myalgias.   Skin: Negative for rash and wound.   Neurological: Negative for dizziness and seizures.   Psychiatric/Behavioral: Positive for confusion. Negative for agitation.       Past Medical History:   Diagnosis Date   • Anxiety    • Arthritis    • Cancer (HCC)     uterine   • Chronic pain    • Depression    • Disease of thyroid gland    • Fibromyalgia    • Headache    • Hyperlipidemia    • Hypertension    • Incontinence    • Insomnia    • Leg pain    • Lumbar stenosis    • Peptic ulcer    • Restless legs    • Vaginal bleeding        Allergies   Allergen Reactions   • Ropinirole Hcl Shortness Of Breath   • Codeine Itching and Mental Status Change   • Definity [Perflutren Lipid  Microsphere] Other (See Comments)     Severe back pain   • Ambien [Zolpidem] Other (See Comments)     HYPER    • Eszopiclone Other (See Comments)     MAKES PT HYPER    • Pregabalin Dizziness   • Tizanidine Other (See Comments)     Terrible nightmares       Past Surgical History:   Procedure Laterality Date   • BLADDER REPAIR  2011    MESH HAD TO BE REMOVED IN 2013   • BREAST BIOPSY Right 2017    benign   • BREAST CYST EXCISION Left 1982   • CARDIAC CATHETERIZATION     • CARPAL TUNNEL RELEASE     • CATARACT EXTRACTION W/ INTRAOCULAR LENS  IMPLANT, BILATERAL     • COLONOSCOPY     • COLONOSCOPY N/A 10/1/2021    Procedure: COLONOSCOPY WITH ANESTHESIA;  Surgeon: Tom Velasco DO;  Location: [facility] ENDOSCOPY;  Service: Gastroenterology;  Laterality: N/A;  pre: change in bowel habits  post: diverticulosis. hemorrhoids.   Olivia Mora APRN       • CYSTECTOMY     • D & C HYSTEROSCOPY N/A 11/6/2017    Procedure: DILATATION AND CURETTAGE HYSTEROSCOPY;  Surgeon: Shasta Madrigal MD;  Location: [facility] OR;  Service:    • DILATION AND CURETTAGE, DIAGNOSTIC / THERAPEUTIC  2008   • ENDOSCOPY  09/23/2010    Short segment of Arriola's,Moderate chroninc esophagogastritis and negative H.pylori   • ENDOSCOPY N/A 9/25/2017    Procedure: ESOPHAGOGASTRODUODENOSCOPY WITH ANESTHESIA;  Surgeon: Tom Velasco DO;  Location:  [facility] ENDOSCOPY;  Service:    • HYSTERECTOMY  12/20/2017   • ORIF TIBIA/FIBULA FRACTURES Left 2000   • TRANSVAGINAL TAPING SUSPENSION N/A 11/6/2017    Procedure: VAGINAL MESH REVISION;  Surgeon: Shasta Madrigal MD;  Location: [facility] OR;  Service:    • VAGINAL MESH REVISION  2013       Family History   Problem Relation Age of Onset   • Diabetes Mother    • Multiple myeloma Mother    • Diabetes Sister    • Ovarian cancer Paternal Aunt    • Prostate cancer Brother    • Lymphoma Brother         NHL   • Cancer Paternal Grandmother         metastatic   • Lung cancer Paternal Grandfather    • Colon cancer Neg Hx    •  Esophageal cancer Neg Hx    • Breast cancer Neg Hx        Social History     Socioeconomic History   • Marital status:    Tobacco Use   • Smoking status: Never Smoker   • Smokeless tobacco: Never Used   Vaping Use   • Vaping Use: Never used   Substance and Sexual Activity   • Alcohol use: No   • Drug use: No   • Sexual activity: Defer           Objective   Physical Exam  Vitals and nursing note reviewed.   Constitutional:       Appearance: She is well-developed.   HENT:      Head: Normocephalic.   Eyes:      Pupils: Pupils are equal, round, and reactive to light.   Cardiovascular:      Rate and Rhythm: Normal rate and regular rhythm.      Heart sounds: Murmur heard.        Comments: She has pitting edema present to the lower extremities.  She has +PMS of the BLE but they are cool to touch.    Pulmonary:      Effort: Pulmonary effort is normal. No respiratory distress.      Breath sounds: Normal breath sounds.   Abdominal:      General: Bowel sounds are normal.      Palpations: Abdomen is soft.      Tenderness: There is no abdominal tenderness. There is no right CVA tenderness or left CVA tenderness.   Musculoskeletal:         General: Normal range of motion.      Cervical back: Normal range of motion and neck supple.   Skin:     General: Skin is warm and dry.   Neurological:      Mental Status: She is alert.      Comments: She is alert and oriented to person and place.  She is confused to time.         Procedures           ED Course  ED Course as of 02/09/22 1436   Wed Feb 09, 2022   0021 Venous doppler US neg for thrombus. CT scans are pending.  [KS]   0044 Transfer of care to Dr Diaz [KS]   2515 Patient was reassessed by myself.  She is alert, oriented and interactive.  Her biggest complaint is back pain.  She states that the bed is very uncomfortable and she does not know why she was brought to the hospital.  She states that she has a history of aortic stenosis and she has never followed up with anyone.  She does have a murmur on examination. [NP]   0403 She is asymptomatic at this time aside from her back pain. She wants to go home. She has no other complaints other than being in the uncomfortable hospital bed. I reassessed the patient and discussed the findings of the work up so far. I explained my impression of the workup to her and addressed all of her questions regarding the emergency department evaluation, diagnosis, and treatment plan in plain and simple language that she was able to understand.     She voiced agreement with the plan of care so far and had no further questions. I told her that there is always some diagnostic uncertainty in the ER and that her work up, physical exam, and even her current presentation may not always reveal other underlying conditions. I also went over the fact that her condition may change or show itself after being discharged. She expressed understanding and agreed that there are reasonable limitations with the practice of emergency medicine.    I gave her return precautions and told her to return to the emergency department within 24 - 48hrs if she has any new, worsening, or concerning symptoms. I told her that it is VERY IMPORTANT that she follows up (by calling to set up an appointment) with her primary care doctor within the next few days or as soon as reasonably possible so that she can be re-evaluated for improvement in her symptoms or for any other questions. She verbalized understanding of these instructions.     She was discharged in stable condition and was observed ambulating out of the ER.   [NP]   0412 She did have two episodes of desaturation to 89% but immediately came up to 94%. She does have some evidence of volume overload. We will ambulate her with a pulse oximeter. [NP]   0434 Patient ambulated appropriately and was satting 97% on room air.  She does walk with a walker and so we assisted her to the chair. [NP]   0453 Her case also with her daughter who is  now at bedside.  She feels as if her mother is back to baseline.  They were concerned that she might have taken too much of her medication.  She will follow up with her family doctor to further evaluate possible polypharmacy or return to the ER if they have any other concerns. [NP]      ED Course User Index  [KS] Adrian Huddleston APRN  [NP] Camila Diaz MD                                                 MDM  Number of Diagnoses or Management Options  Generalized abdominal pain: minor  Weakness: minor     Amount and/or Complexity of Data Reviewed  Clinical lab tests: reviewed and ordered  Tests in the radiology section of CPT®: ordered and reviewed  Tests in the medicine section of CPT®: reviewed  Decide to obtain previous medical records or to obtain history from someone other than the patient: yes  Discuss the patient with other providers: yes    Risk of Complications, Morbidity, and/or Mortality  Presenting problems: minimal  Diagnostic procedures: minimal  Management options: minimal    Patient Progress  Patient progress: improved      Final diagnoses:   Generalized abdominal pain   Weakness       ED Disposition  ED Disposition     ED Disposition Condition Comment    Discharge Stable           Olivia Mora APRN  [address] [place] [address] [address]  [facility] [id number] [phone]    Call in 1 day  As needed, If symptoms worsen         Medication List      No changes were made to your prescriptions during this visit.          Adrian Huddleston APRN  02/09/22 8439

## 2022-02-10 LAB
QT INTERVAL: 376 MS
QTC INTERVAL: 467 MS

## 2022-02-13 LAB
BACTERIA SPEC AEROBE CULT: NORMAL
BACTERIA SPEC AEROBE CULT: NORMAL

## 2022-03-14 RX ORDER — DICLOFENAC SODIUM 75 MG/1
75 TABLET, DELAYED RELEASE ORAL 2 TIMES DAILY
Qty: 60 TABLET | Refills: 1 | Status: SHIPPED | OUTPATIENT
Start: 2022-03-14 | End: 2022-07-18

## 2022-03-14 NOTE — TELEPHONE ENCOUNTER
Martha Dean called requesting a refill of the below medication which has been pended for you:     Requested Prescriptions     Pending Prescriptions Disp Refills    diclofenac (VOLTAREN) 75 MG EC tablet 60 tablet 1     Sig: Take 1 tablet by mouth 2 times daily       Last Appointment Date: 2/1/2022  Next Appointment Date: 5/2/2022    Allergies   Allergen Reactions    Ambien [Zolpidem Tartrate]     Codeine     Lyrica [Pregabalin]     Morphine     Requip [Ropinirole Hcl]     Tizanidine      Terrible nightmares

## 2022-03-29 ENCOUNTER — OFFICE VISIT (OUTPATIENT)
Dept: CARDIOLOGY | Facility: CLINIC | Age: 82
End: 2022-03-29

## 2022-03-29 VITALS
OXYGEN SATURATION: 98 % | BODY MASS INDEX: 44.16 KG/M2 | WEIGHT: 240 LBS | DIASTOLIC BLOOD PRESSURE: 78 MMHG | HEIGHT: 62 IN | SYSTOLIC BLOOD PRESSURE: 156 MMHG | HEART RATE: 73 BPM

## 2022-03-29 DIAGNOSIS — I10 ESSENTIAL HYPERTENSION: Primary | Chronic | ICD-10-CM

## 2022-03-29 DIAGNOSIS — I87.2 VENOUS INSUFFICIENCY OF BOTH LOWER EXTREMITIES: ICD-10-CM

## 2022-03-29 PROCEDURE — 99204 OFFICE O/P NEW MOD 45 MIN: CPT | Performed by: INTERNAL MEDICINE

## 2022-03-29 PROCEDURE — 93000 ELECTROCARDIOGRAM COMPLETE: CPT | Performed by: INTERNAL MEDICINE

## 2022-03-29 RX ORDER — UBIDECARENONE 100 MG
100 CAPSULE ORAL DAILY
Status: ON HOLD | COMMUNITY
End: 2023-03-21

## 2022-03-29 RX ORDER — DONEPEZIL HYDROCHLORIDE 10 MG/1
10 TABLET, FILM COATED ORAL NIGHTLY
COMMUNITY
End: 2022-08-21 | Stop reason: HOSPADM

## 2022-03-29 NOTE — PROGRESS NOTES
Subjective:     Encounter Date:03/29/2022      Patient ID: Jennifer Gomes is a 81 y.o. female who is referred here for further evaluation of hypertension, venous insufficiency.    Referring Provider: Shasta Madrigal MD  Reason for Referral: Essential hypertension; venous insufficiency    Chief Complaint: Here today for further evaluation, thought to have valvular heart disease    This is a 81-year-old female who is referred for further evaluation of venous insufficiency.  The patient notes that she has a longstanding history of lower extremity edema as well as redness on the anterior pretibial surface.  She was given antibiotics fairly recently and says that her legs look better than they have in quite some time.  She denies having orthopnea, PND.  She is very sedentary.  She says that she is essentially immobile due to back pain and other issues.  She denies having any chest pain.  Previously, she had a stress test and echocardiogram in 2019.  These studies did not show any significant cardiac findings.  She does have mild valvular regurgitation but no significant findings otherwise.  The patient does endorse occasional palpitations but no associated symptoms such as lightheadedness, dizziness, syncope.  Generally, blood pressure is well controlled although does wax and wane to some degree.  She has not experienced any side effects from her current medications.  She says that she does not sleep well.  She also has considerable arthritis and takes Tylenol regularly.  No side effects of any of her current medications.  She says that from a cardiac standpoint she seems to be doing well, however.    The following portions of the patient's history were reviewed and updated as appropriate: allergies, current medications, past family history, past medical history, past social history, past surgical history and problem list.     Past Medical History:   Diagnosis Date   • Anxiety    • Arthritis    • Cancer (HCC)      uterine   • Chronic pain    • Depression    • Disease of thyroid gland    • Fibromyalgia    • Headache    • Hyperlipidemia    • Hypertension    • Incontinence    • Insomnia    • Leg pain    • Lumbar stenosis    • Peptic ulcer    • Restless legs    • Vaginal bleeding      Past Surgical History:   Procedure Laterality Date   • BLADDER REPAIR  2011    MESH HAD TO BE REMOVED IN 2013   • BREAST BIOPSY Right 2017    benign   • BREAST CYST EXCISION Left 1982   • CARDIAC CATHETERIZATION     • CARPAL TUNNEL RELEASE     • CATARACT EXTRACTION W/ INTRAOCULAR LENS  IMPLANT, BILATERAL     • COLONOSCOPY     • COLONOSCOPY N/A 10/1/2021    Procedure: COLONOSCOPY WITH ANESTHESIA;  Surgeon: Tom Velasco DO;  Location:  [facility];  Service: Gastroenterology;  Laterality: N/A;  pre: change in bowel habits  post: diverticulosis. hemorrhoids.   Olivia Mora, MORGAN       • CYSTECTOMY     • D & C HYSTEROSCOPY N/A 11/6/2017    Procedure: DILATATION AND CURETTAGE HYSTEROSCOPY;  Surgeon: Shasta Madrigal MD;  Location: [facility] OR;  Service:    • DILATION AND CURETTAGE, DIAGNOSTIC / THERAPEUTIC  2008   • ENDOSCOPY  09/23/2010    Short segment of Arriola's,Moderate chroninc esophagogastritis and negative H.pylori   • ENDOSCOPY N/A 9/25/2017    Procedure: ESOPHAGOGASTRODUODENOSCOPY WITH ANESTHESIA;  Surgeon: Tom Velasco DO;  Location:  [facility];  Service:    • HYSTERECTOMY  12/20/2017   • ORIF TIBIA/FIBULA FRACTURES Left 2000   • TRANSVAGINAL TAPING SUSPENSION N/A 11/6/2017    Procedure: VAGINAL MESH REVISION;  Surgeon: Shasta Madrigal MD;  Location: [facility] OR;  Service:    • VAGINAL MESH REVISION  2013       Current Outpatient Medications:   •  ALPRAZolam (XANAX) 1 MG tablet, Take 1 tablet by mouth 3 (Three) Times a Day As Needed for Anxiety., Disp: 90 tablet, Rfl: 2  •  atorvastatin (LIPITOR) 40 MG tablet, , Disp: , Rfl:   •  bumetanide (BUMEX) 1 MG tablet, , Disp: , Rfl:   •  butalbital-acetaminophen-caffeine  (FIORICET, ESGIC) -40 MG per tablet, Take 1 tablet by mouth Every 4 (Four) Hours As Needed for Headache., Disp: 20 tablet, Rfl: 2  •  carvedilol (COREG) 12.5 MG tablet, Take 12.5 mg by mouth 2 (Two) Times a Day With Meals., Disp: , Rfl:   •  coenzyme Q10 100 MG capsule, Take 100 mg by mouth Daily., Disp: , Rfl:   •  cyclobenzaprine (FLEXERIL) 10 MG tablet, Take 5 mg by mouth 3 (Three) Times a Day As Needed for Muscle Spasms., Disp: , Rfl:   •  diclofenac (VOLTAREN) 75 MG EC tablet, Take 75 mg by mouth 2 (Two) Times a Day., Disp: , Rfl:   •  donepezil (ARICEPT) 10 MG tablet, Take 10 mg by mouth Every Night., Disp: , Rfl:   •  ergocalciferol (ERGOCALCIFEROL) 34038 units capsule, Take 50,000 Units by mouth., Disp: , Rfl:   •  ipratropium (ATROVENT) 0.06 % nasal spray, 2 sprays into the nostril(s) as directed by provider 4 (Four) Times a Day As Needed for Rhinitis. For drainage, Disp: 45 mL, Rfl: 3  •  lansoprazole (PREVACID) 30 MG capsule, Take 30 mg by mouth Daily., Disp: , Rfl:   •  levothyroxine (SYNTHROID, LEVOTHROID) 50 MCG tablet, Take 50 mcg by mouth Every Morning., Disp: , Rfl:   •  Myrbetriq 25 MG tablet sustained-release 24 hour 24 hr tablet, Take 25 mg by mouth Daily., Disp: , Rfl:   •  sertraline (ZOLOFT) 25 MG tablet, Take 1 tablet by mouth Daily., Disp: 90 tablet, Rfl: 3  •  SUMAtriptan (IMITREX) 50 MG tablet, Take 1 tablet by mouth 2 (Two) Times a Day As Needed for Migraine., Disp: 30 tablet, Rfl: 3  •  venlafaxine (EFFEXOR) 25 MG tablet, Take 1 tablet by mouth Daily., Disp: 90 tablet, Rfl: 3  •  donepezil (ARICEPT) 10 MG tablet, Take 10 mg by mouth., Disp: , Rfl:     Allergies   Allergen Reactions   • Ropinirole Hcl Shortness Of Breath   • Codeine Itching and Mental Status Change   • Definity [Perflutren Lipid Microsphere] Other (See Comments)     Severe back pain   • Ambien [Zolpidem] Other (See Comments)     HYPER    • Eszopiclone Other (See Comments)     MAKES PT HYPER    • Pregabalin Dizziness    • Tizanidine Other (See Comments)     Terrible nightmares     Social History     Tobacco Use   • Smoking status: Never Smoker   • Smokeless tobacco: Never Used   Substance Use Topics   • Alcohol use: Yes     Comment: occasional     Family History   Problem Relation Age of Onset   • Diabetes Mother    • Multiple myeloma Mother    • Stroke Father    • Diabetes Sister    • Prostate cancer Brother    • Lymphoma Brother         NHL   • Ovarian cancer Paternal Aunt    • Cancer Paternal Grandmother         metastatic   • Lung cancer Paternal Grandfather    • Colon cancer Neg Hx    • Esophageal cancer Neg Hx    • Breast cancer Neg Hx      Review of Systems   Constitutional: Positive for malaise/fatigue. Negative for chills, fever and weight loss.   HENT: Negative for congestion and hearing loss.    Eyes: Negative for blurred vision and pain.   Cardiovascular: Positive for leg swelling and palpitations. Negative for chest pain, dyspnea on exertion, orthopnea, paroxysmal nocturnal dyspnea and syncope.   Respiratory: Negative for cough, shortness of breath and wheezing.    Endocrine: Negative for cold intolerance and heat intolerance.   Hematologic/Lymphatic: Negative for adenopathy and bleeding problem.   Skin: Positive for color change. Negative for rash.   Musculoskeletal: Positive for arthritis and back pain. Negative for neck pain.   Gastrointestinal: Negative for abdominal pain, change in bowel habit, nausea and vomiting.   Genitourinary: Negative for dysuria and frequency.   Neurological: Positive for numbness and weakness. Negative for dizziness and headaches.   Psychiatric/Behavioral: Negative for altered mental status and hallucinations. The patient has insomnia.    Allergic/Immunologic: Negative for hives and persistent infections.       ECG 12 Lead    Date/Time: 3/29/2022 4:09 PM  Performed by: Eleazar Ramires MD  Authorized by: Eleazar Ramires MD   Comparison: compared with previous ECG from  2/8/2022  Similar to previous ECG  Rhythm: sinus rhythm  Rate: normal  BPM: 72  Conduction: conduction normal  QRS axis: normal  Other findings: non-specific ST-T wave changes    Clinical impression: non-specific ECG             Objective:     Vitals reviewed.   Constitutional:       General: Not in acute distress.     Appearance: Not in distress. Obese. Chronically ill-appearing. Not toxic-appearing or diaphoretic.      Comments: Seated in a wheelchair   Eyes:      General: Lids are normal.      Extraocular Movements: Extraocular movements intact.      Pupils: Pupils are equal, round, and reactive to light.   HENT:      Head: Normocephalic and atraumatic.      Right Ear: External ear normal.      Left Ear: External ear normal.      Nose: Nose normal.    Mouth/Throat:      Mouth: Mucous membranes are not pale, not dry and not cyanotic.   Neck:      Thyroid: No thyroid mass or thyromegaly.      Vascular: No carotid bruit, hepatojugular reflux or JVD.      Trachea: No tracheal deviation.      Lymphadenopathy: No cervical adenopathy.   Pulmonary:      Effort: Pulmonary effort is normal. No accessory muscle usage or respiratory distress.      Breath sounds: Normal breath sounds. No wheezing. No rhonchi. No rales.   Chest:      Chest wall: Not tender to palpatation.   Cardiovascular:      Normal rate. Regular rhythm.      Murmurs: There is no murmur.      No gallop.   Pulses:     Intact distal pulses.   Edema:     Peripheral edema present.     Comments: Changes of chronic venous insufficiency noted on bilateral anterior pretibial surfaces with mild erythema, no obvious skin breakdown  Abdominal:      General: Abdomen is protuberant. Bowel sounds are normal. There is no distension or abdominal bruit.      Palpations: Abdomen is soft.      Tenderness: There is no abdominal tenderness.   Musculoskeletal:         General: No tenderness or deformity.      Extremities: No clubbing present.     Cervical back: Normal range of  "motion and neck supple. No edema. Skin:     General: Skin is warm and dry. There is no cyanosis.      Findings: No erythema.   Neurological:      General: No focal deficit present.      Mental Status: Oriented to person, place, and time and oriented to person, place and time.      Cranial Nerves: No cranial nerve deficit.   Psychiatric:         Attention and Perception: Attention normal.         Mood and Affect: Mood normal.         Speech: Speech normal.         Behavior: Behavior normal. Behavior is cooperative.         Thought Content: Thought content normal.       /78   Pulse 73   Ht 157.5 cm (62\")   Wt 109 kg (240 lb)   LMP  (LMP Unknown)   SpO2 98%   BMI 43.90 kg/m²     Data/Lab Review:     Echocardiogram on 5/17/2019 - I reviewed the images today personally and my interpretation is as follows: Normal left ventricular systolic function with estimated ejection fraction of 61-65%, evidence of diastolic dysfunction is noted.  Mild concentric LVH.  Normal right ventricular size and systolic function.  Dilated left and right atria.  Aortic sclerosis is noted, however no significant aortic valve stenosis or regurgitation is noted.  Trace-mild mitral valve regurgitation as well as mild tricuspid valve regurgitation with estimated RVSP greater than 55 mmHg.    Stress echocardiogram on 5/17/2019: Low risk for myocardial ischemia.    Lower extremity venous duplex on 2/9/2022: No evidence of DVT or superficial thrombophlebitis of right or left lower extremities.    BNP on 2/8/2022: Within normal limits at 621    Lab Results   Component Value Date    WBC 13.00 (H) 02/08/2022    HGB 11.8 (L) 02/08/2022    HCT 35.9 02/08/2022    MCV 91.6 02/08/2022     02/08/2022     Lab Results   Component Value Date    GLUCOSE 135 (H) 02/08/2022    CALCIUM 9.8 02/08/2022     02/08/2022    K 3.8 02/08/2022    CO2 22.0 02/08/2022     02/08/2022    BUN 27 (H) 02/08/2022    CREATININE 1.10 (H) 02/08/2022    " EGFRIFAFRI >59 02/01/2022    EGFRIFNONA 48 (L) 02/08/2022    BCR 24.5 02/08/2022    ANIONGAP 13.0 02/08/2022         Assessment:          Diagnosis Plan   1. Essential hypertension  ECG 12 Lead   2. Venous insufficiency of both lower extremities          Plan:       1.  Essential hypertension: Blood pressure is mildly elevated today but tends to wax and wane according to the patient's report.  Continue to monitor.  She is on carvedilol.  If heart rate is above 60 and blood pressure remains elevated long-term, she may consider increasing this to 25 mg twice daily.  I will defer this to her primary care provider, however.    2.  Venous insufficiency: The patient has bilateral lower extremity edema and chronic stasis dermatitis changes in bilateral pretibial surfaces.  We discussed the benefits of compression and elevation today.  The patient says that she will probably try to use Ace bandages or compression stockings if able.  At this time I would not feel strongly about any further cardiac testing, however.    At this time, we will see the patient back as needed.  She does not have any significant cardiac issues at the present time.

## 2022-04-04 DIAGNOSIS — M54.2 NECK PAIN: ICD-10-CM

## 2022-04-04 RX ORDER — CYCLOBENZAPRINE HCL 10 MG
10 TABLET ORAL 3 TIMES DAILY PRN
Qty: 90 TABLET | Refills: 0 | Status: SHIPPED | OUTPATIENT
Start: 2022-04-04 | End: 2022-05-03 | Stop reason: SDUPTHER

## 2022-04-18 RX ORDER — CARVEDILOL 12.5 MG/1
TABLET ORAL
Qty: 60 TABLET | Refills: 5 | Status: SHIPPED | OUTPATIENT
Start: 2022-04-18

## 2022-04-18 NOTE — TELEPHONE ENCOUNTER
Kimberly Soto called requesting a refill of the below medication which has been pended for you:     Requested Prescriptions     Pending Prescriptions Disp Refills    carvedilol (COREG) 12.5 MG tablet 60 tablet 5     Sig: TAKE 1 TABLET TWICE A DAY       Last Appointment Date: 2/1/2022  Next Appointment Date: 5/2/2022    Allergies   Allergen Reactions    Ambien [Zolpidem Tartrate]     Codeine     Lyrica [Pregabalin]     Morphine     Requip [Ropinirole Hcl]     Tizanidine      Terrible nightmares

## 2022-04-20 ENCOUNTER — TELEPHONE (OUTPATIENT)
Dept: INTERNAL MEDICINE | Age: 82
End: 2022-04-20

## 2022-04-20 RX ORDER — LANSOPRAZOLE 30 MG/1
CAPSULE, DELAYED RELEASE ORAL
Qty: 90 CAPSULE | Refills: 1 | Status: SHIPPED | OUTPATIENT
Start: 2022-04-20 | End: 2022-10-18

## 2022-04-20 NOTE — TELEPHONE ENCOUNTER
Requested Prescriptions     Pending Prescriptions Disp Refills    lansoprazole (PREVACID) 30 MG delayed release capsule 90 capsule 1     Sig: TAKE 1 CAPSULE DAILY EVERY MORNING

## 2022-04-20 NOTE — TELEPHONE ENCOUNTER
----- Message from Iona Lefort sent at 4/20/2022 12:23 PM CDT -----  Subject: Refill Request    QUESTIONS  Name of Medication? lansoprazole (PREVACID) 30 MG delayed release capsule  Patient-reported dosage and instructions? TAKE 1 CAPSULE DAILY EVERY   MORNING  How many days do you have left? 0  Preferred Pharmacy? [address] phone number (if available)? [phone]  ---------------------------------------------------------------------------  --------------  CALL BACK INFO  What is the best way for the office to contact you? OK to leave message on   voicemail  Preferred Call Back Phone Number? [phone]  ---------------------------------------------------------------------------  --------------  SCRIPT ANSWERS  Relationship to Patient?  Self

## 2022-05-03 ENCOUNTER — OFFICE VISIT (OUTPATIENT)
Dept: INTERNAL MEDICINE | Age: 82
End: 2022-05-03
Payer: MEDICARE

## 2022-05-03 VITALS
OXYGEN SATURATION: 99 % | HEART RATE: 72 BPM | DIASTOLIC BLOOD PRESSURE: 70 MMHG | SYSTOLIC BLOOD PRESSURE: 138 MMHG | WEIGHT: 240 LBS | BODY MASS INDEX: 43.9 KG/M2

## 2022-05-03 DIAGNOSIS — M54.32 LEFT SIDED SCIATICA: ICD-10-CM

## 2022-05-03 DIAGNOSIS — Z00.00 WELL ADULT EXAM: ICD-10-CM

## 2022-05-03 DIAGNOSIS — E78.2 MIXED HYPERLIPIDEMIA: ICD-10-CM

## 2022-05-03 DIAGNOSIS — M54.2 NECK PAIN: ICD-10-CM

## 2022-05-03 DIAGNOSIS — E55.9 VITAMIN D DEFICIENCY: ICD-10-CM

## 2022-05-03 DIAGNOSIS — I10 PRIMARY HYPERTENSION: ICD-10-CM

## 2022-05-03 DIAGNOSIS — M48.061 SPINAL STENOSIS AT L4-L5 LEVEL: Primary | Chronic | ICD-10-CM

## 2022-05-03 LAB
ALBUMIN SERPL-MCNC: 4.1 G/DL (ref 3.5–5.2)
ALP BLD-CCNC: 70 U/L (ref 35–104)
ALT SERPL-CCNC: 9 U/L (ref 5–33)
ANION GAP SERPL CALCULATED.3IONS-SCNC: 16 MMOL/L (ref 7–19)
AST SERPL-CCNC: 13 U/L (ref 5–32)
BACTERIA: NEGATIVE /HPF
BASOPHILS ABSOLUTE: 0 K/UL (ref 0–0.2)
BASOPHILS RELATIVE PERCENT: 0.6 % (ref 0–1)
BILIRUB SERPL-MCNC: <0.2 MG/DL (ref 0.2–1.2)
BILIRUBIN URINE: NEGATIVE
BLOOD, URINE: NEGATIVE
BUN BLDV-MCNC: 14 MG/DL (ref 8–23)
CALCIUM SERPL-MCNC: 9.7 MG/DL (ref 8.8–10.2)
CHLORIDE BLD-SCNC: 108 MMOL/L (ref 98–111)
CHOLESTEROL, TOTAL: 215 MG/DL (ref 160–199)
CLARITY: CLEAR
CO2: 22 MMOL/L (ref 22–29)
COLOR: YELLOW
CREAT SERPL-MCNC: 0.9 MG/DL (ref 0.5–0.9)
CRYSTALS, UA: ABNORMAL /HPF
EOSINOPHILS ABSOLUTE: 0.3 K/UL (ref 0–0.6)
EOSINOPHILS RELATIVE PERCENT: 5 % (ref 0–5)
EPITHELIAL CELLS, UA: 2 /HPF (ref 0–5)
GFR AFRICAN AMERICAN: >59
GFR NON-AFRICAN AMERICAN: >60
GLUCOSE BLD-MCNC: 103 MG/DL (ref 74–109)
GLUCOSE URINE: NEGATIVE MG/DL
HCT VFR BLD CALC: 38.9 % (ref 37–47)
HDLC SERPL-MCNC: 49 MG/DL (ref 65–121)
HEMOGLOBIN: 12.7 G/DL (ref 12–16)
HYALINE CASTS: 2 /HPF (ref 0–8)
IMMATURE GRANULOCYTES #: 0 K/UL
KETONES, URINE: NEGATIVE MG/DL
LDL CHOLESTEROL CALCULATED: 129 MG/DL
LEUKOCYTE ESTERASE, URINE: ABNORMAL
LYMPHOCYTES ABSOLUTE: 1.5 K/UL (ref 1.1–4.5)
LYMPHOCYTES RELATIVE PERCENT: 30.6 % (ref 20–40)
MCH RBC QN AUTO: 30.8 PG (ref 27–31)
MCHC RBC AUTO-ENTMCNC: 32.6 G/DL (ref 33–37)
MCV RBC AUTO: 94.4 FL (ref 81–99)
MONOCYTES ABSOLUTE: 0.5 K/UL (ref 0–0.9)
MONOCYTES RELATIVE PERCENT: 9.3 % (ref 0–10)
NEUTROPHILS ABSOLUTE: 2.7 K/UL (ref 1.5–7.5)
NEUTROPHILS RELATIVE PERCENT: 54.3 % (ref 50–65)
NITRITE, URINE: NEGATIVE
PDW BLD-RTO: 12.9 % (ref 11.5–14.5)
PH UA: 6 (ref 5–8)
PLATELET # BLD: 179 K/UL (ref 130–400)
PMV BLD AUTO: 11.2 FL (ref 9.4–12.3)
POTASSIUM SERPL-SCNC: 4.7 MMOL/L (ref 3.5–5)
PROTEIN UA: ABNORMAL MG/DL
RBC # BLD: 4.12 M/UL (ref 4.2–5.4)
RBC UA: 2 /HPF (ref 0–4)
SODIUM BLD-SCNC: 146 MMOL/L (ref 136–145)
SPECIFIC GRAVITY UA: 1.02 (ref 1–1.03)
TOTAL PROTEIN: 6.1 G/DL (ref 6.6–8.7)
TRIGL SERPL-MCNC: 186 MG/DL (ref 0–149)
TSH SERPL DL<=0.05 MIU/L-ACNC: 3.18 UIU/ML (ref 0.27–4.2)
UROBILINOGEN, URINE: 0.2 E.U./DL
VITAMIN D 25-HYDROXY: 60 NG/ML
WBC # BLD: 5 K/UL (ref 4.8–10.8)
WBC UA: 23 /HPF (ref 0–5)

## 2022-05-03 PROCEDURE — 99214 OFFICE O/P EST MOD 30 MIN: CPT | Performed by: NURSE PRACTITIONER

## 2022-05-03 PROCEDURE — G8400 PT W/DXA NO RESULTS DOC: HCPCS | Performed by: NURSE PRACTITIONER

## 2022-05-03 PROCEDURE — 1090F PRES/ABSN URINE INCON ASSESS: CPT | Performed by: NURSE PRACTITIONER

## 2022-05-03 PROCEDURE — G8417 CALC BMI ABV UP PARAM F/U: HCPCS | Performed by: NURSE PRACTITIONER

## 2022-05-03 PROCEDURE — G8427 DOCREV CUR MEDS BY ELIG CLIN: HCPCS | Performed by: NURSE PRACTITIONER

## 2022-05-03 PROCEDURE — 1123F ACP DISCUSS/DSCN MKR DOCD: CPT | Performed by: NURSE PRACTITIONER

## 2022-05-03 PROCEDURE — 4040F PNEUMOC VAC/ADMIN/RCVD: CPT | Performed by: NURSE PRACTITIONER

## 2022-05-03 PROCEDURE — 1036F TOBACCO NON-USER: CPT | Performed by: NURSE PRACTITIONER

## 2022-05-03 RX ORDER — CYCLOBENZAPRINE HCL 10 MG
10 TABLET ORAL 3 TIMES DAILY PRN
Qty: 90 TABLET | Refills: 0 | Status: SHIPPED | OUTPATIENT
Start: 2022-05-03 | End: 2022-08-01

## 2022-05-03 RX ORDER — METHYLPREDNISOLONE 4 MG/1
TABLET ORAL
Qty: 1 KIT | Refills: 0 | Status: SHIPPED | OUTPATIENT
Start: 2022-05-03 | End: 2022-06-28 | Stop reason: SDUPTHER

## 2022-05-03 ASSESSMENT — ENCOUNTER SYMPTOMS
SORE THROAT: 0
CONSTIPATION: 0
SHORTNESS OF BREATH: 0
EYE DISCHARGE: 0
COLOR CHANGE: 0
NAUSEA: 0
STRIDOR: 0
ABDOMINAL PAIN: 0
DIARRHEA: 0
BACK PAIN: 1
CHOKING: 0
ABDOMINAL DISTENTION: 0
BLOOD IN STOOL: 0
TROUBLE SWALLOWING: 0
VOMITING: 0
COUGH: 0
EYE ITCHING: 0
WHEEZING: 0

## 2022-05-03 NOTE — PROGRESS NOTES
[facility] PHYSICIAN SERVICES  [facility] INTERNAL MEDICINE  [address] [facility] [address] [address]  Dept: [phone]  Dept Fax: [phone]: [phone]    Jorje Ross (:  1940) is a 80 y.o. female,Established patient  with green, here for evaluation of the following chief complaint(s): 3 Month [address] [address] Armin is a 80 y.o. female who presents today for her medical conditions/complaints as noted below. Jorje Ross is c/maame 3 Month Follow-Up        HPI:     Chief Complaint   Patient presents with    3 Month Follow-Up     HPI   1. Osteoarthritis; She is taking co Q 10; It has really helped her pain and alertness   2. DDD  She is on diclfenac BID; wanted to increase but doesn't want to harm her kidneys ; she has been taking 8 tylenol 500 twice daily  8 asa 325; for the back pain  She Is on oxycodone with Dr Symone Baker     3. Left sciatica she is taking flexil 5-10 2-3 times a day   4. Hot flashes in her legs she feels the back of her legs when she is sitting for a while gets on fire. There is no sweating.   This is most likely related to her back  Past Medical History:   Diagnosis Date    Adenocarcinoma of endometrium, stage 1 ([address] 2017    Anemia     Arthritis     CAD (coronary artery disease)     \"Stiff Valve\"    Chronic kidney disease     Depression     Fibrocystic breast disease     17 biopsy - benign FCBD w/microcalcifications - BHP     GERD (gastroesophageal reflux disease)     H/O vaginal bleeding     managed Dr Wright Presume    Hypertension     Hypothyroidism (acquired)     Hypothyroidism (acquired)     Mixed hyperlipidemia     Obesity     Osteoarthritis     Pain of both hip joints 2016    Situational anxiety     Sleep apnea     Vitamin D deficiency       Past Surgical History:   Procedure Laterality Date    BREAST BIOPSY Right     BREAST SURGERY Left     Biopsy, Benign    CYST REMOVAL Left     Shoulder cyst, benign    DILATION AND CURETTAGE OF UTERUS      EYE SURGERY  03/2017    HYSTERECTOMY      Removed uterus and cervix due to cancer, 12/17    LEG SURGERY Left     Tib/Fib ORIF    TONSILLECTOMY         Vitals 5/3/2022 2/1/2022 10/18/2021 10/18/2021 10/18/2021 8/63/3791   SYSTOLIC 755 505 [phone] 401   DIASTOLIC 70 80 74 76 74 70   Site - - - - - -   Position - - - - - -   Pulse 72 90 - - 68 -   Temp - - - - 97.6 -   Resp - - - - - -   SpO2 99 99 - - 98 -   Weight 240 lb 230 lb - - - -   Height - 5' 2\" - - - -   Body mass index - 42.06 kg/m2 - - - -   Some recent data might be hidden       Family History   Problem Relation Age of Onset    Cancer Mother        Social History     Tobacco Use    Smoking status: Never Smoker    Smokeless tobacco: Never Used   Substance Use Topics    Alcohol use: Yes     Comment: occassionally      Current Outpatient Medications   Medication Sig Dispense Refill    cyclobenzaprine (FLEXERIL) 10 MG tablet Take 1 tablet by mouth 3 times daily as needed for Muscle spasms 90 tablet 0    methylPREDNISolone (MEDROL DOSEPACK) 4 MG tablet Take by mouth. 1 kit 0    lansoprazole (PREVACID) 30 MG delayed release capsule TAKE 1 CAPSULE DAILY EVERY MORNING 90 capsule 1    carvedilol (COREG) 12.5 MG tablet TAKE 1 TABLET TWICE A DAY 60 tablet 5    diclofenac (VOLTAREN) 75 MG EC tablet Take 1 tablet by mouth 2 times daily 60 tablet 1    MYRBETRIQ 25 MG TB24 Take 2 tablets by mouth daily 90 tablet 1    ipratropium (ATROVENT) 0.06 % nasal spray by Each Nostril route 2 times daily      SUMAtriptan (IMITREX) 50 MG tablet TAKE 1 TABLET BY MOUTH TWICE DAILY AS NEEDED FOR MIGRAINE. MAX 2 TABLETS DAILY. 18 tablet 10    vitamin D (ERGOCALCIFEROL) 1.25 MG (20258 UT) CAPS capsule TAKE 1 CAPSULE BY MOUTH ONCE A WEEK  12 capsule 2    atorvastatin (LIPITOR) 40 MG tablet TAKE 1 TABLET DAILY AS DIRECTED.  90 tablet 3    levothyroxine (SYNTHROID) 50 MCG tablet TAKE 1 TABLET DAILY 90 tablet 3    bumetanide (BUMEX) 1 MG tablet TAKE 1 TABLET DAILY 90 tablet 3    sertraline (ZOLOFT) 25 MG tablet       ALPRAZolam (XANAX) 0.5 MG tablet       venlafaxine (EFFEXOR) 25 MG tablet       butalbital-acetaminophen-caffeine (FIORICET, ESGIC) -40 MG per tablet       donepezil (ARICEPT) 10 MG tablet Take 1 tablet by mouth nightly 90 tablet 1     No current facility-administered medications for this visit.      Allergies   Allergen Reactions    Ambien [Zolpidem Tartrate]     Codeine     Lyrica [Pregabalin]     Morphine     Requip [Ropinirole Hcl]     Tizanidine      Terrible nightmares         Health Maintenance   Topic Date Due    Breast cancer screen  08/30/2021    DTaP/Tdap/Td vaccine (1 - Tdap) 05/24/2022 (Originally 9/19/1959)    Shingles vaccine (3 of 3) 05/03/2023 (Originally 12/5/2019)    COVID-19 Vaccine (3 - Booster for Dawson Banister series) 06/27/2023 (Originally 9/5/2021)    Depression Screen  07/13/2022    Annual Wellness Visit (AWV)  07/14/2022    Lipids  05/03/2023    TSH  05/03/2023    Potassium  05/03/2023    Creatinine  05/03/2023    DEXA (modify frequency per FRAX score)  Completed    Flu vaccine  Completed    Pneumococcal 65+ years Vaccine  Completed    Hepatitis C screen  Completed    Hepatitis A vaccine  Aged Out    Hepatitis B vaccine  Aged Out    Hib vaccine  Aged Out    Meningococcal (ACWY) vaccine  Aged Out       No results found for: LABA1C  No results found for: PSA, PSADIA  TSH   Date Value Ref Range Status   05/03/2022 3.180 0.270 - 4.200 uIU/mL Final   ]  Lab Results   Component Value Date     (H) 05/03/2022    K 4.7 05/03/2022     05/03/2022    CO2 22 05/03/2022    BUN 14 05/03/2022    CREATININE 0.9 05/03/2022    GLUCOSE 103 05/03/2022    CALCIUM 9.7 05/03/2022    PROT 6.1 (L) 05/03/2022    LABALBU 4.1 05/03/2022    BILITOT <0.2 05/03/2022    ALKPHOS 70 05/03/2022    AST 13 05/03/2022    ALT 9 05/03/2022    LABGLOM >60 05/03/2022    GFRAA >59 05/03/2022     Lab Results Component Value Date    CHOL 215 (H) 05/03/2022    CHOL 194 04/02/2021    CHOL 176 06/26/2020     Lab Results   Component Value Date    TRIG 186 (H) 05/03/2022    TRIG 160 (H) 04/02/2021    TRIG 105 06/26/2020     Lab Results   Component Value Date    HDL 49 (L) 05/03/2022    HDL 57 (L) 04/02/2021    HDL 60 (L) 06/26/2020     Lab Results   Component Value Date    LDLCALC 129 05/03/2022    LDLCALC 105 04/02/2021    LDLCALC 95 06/26/2020     Lab Results   Component Value Date     05/03/2022    K 4.7 05/03/2022     05/03/2022    CO2 22 05/03/2022    BUN 14 05/03/2022    CREATININE 0.9 05/03/2022    GLUCOSE 103 05/03/2022    CALCIUM 9.7 05/03/2022      Lab Results   Component Value Date    WBC 5.0 05/03/2022    HGB 12.7 05/03/2022    HCT 38.9 05/03/2022    MCV 94.4 05/03/2022     05/03/2022    LYMPHOPCT 30.6 05/03/2022    RBC 4.12 (L) 05/03/2022    MCH 30.8 05/03/2022    MCHC 32.6 (L) 05/03/2022    RDW 12.9 05/03/2022     Lab Results   Component Value Date    VITD25 60.0 05/03/2022     Labs reviewed from 5/3/2022    Subjective:      Review of Systems   Constitutional: Negative for fatigue, fever and unexpected weight change. HENT: Negative for ear discharge, ear pain, mouth sores, sore throat and trouble swallowing. Eyes: Negative for discharge, itching and visual disturbance. Respiratory: Negative for cough, choking, shortness of breath, wheezing and stridor. Cardiovascular: Negative for chest pain, palpitations and leg swelling. Gastrointestinal: Negative for abdominal distention, abdominal pain, blood in stool, constipation, diarrhea, nausea and vomiting. Endocrine: Negative for cold intolerance, polydipsia and polyuria. Genitourinary: Negative for difficulty urinating, dysuria, frequency and urgency. Musculoskeletal: Positive for arthralgias and back pain. Negative for gait problem. Skin: Negative for color change and rash.    Allergic/Immunologic: Negative for food allergies and immunocompromised state. Neurological: Negative for dizziness, tremors, syncope, speech difficulty, weakness and headaches. Hematological: Negative for adenopathy. Does not bruise/bleed easily. Psychiatric/Behavioral: Negative for confusion and hallucinations. Objective:     Physical Exam  Constitutional:       General: She is not in acute distress. Appearance: She is well-developed. HENT:      Head: Normocephalic and atraumatic. Eyes:      General: No scleral icterus. Right eye: No discharge. Left eye: No discharge. Pupils: Pupils are equal, round, and reactive to light. Neck:      Thyroid: No thyromegaly. Vascular: No JVD. Cardiovascular:      Rate and Rhythm: Normal rate and regular rhythm. Heart sounds: Normal heart sounds. No murmur heard. Pulmonary:      Effort: Pulmonary effort is normal. No respiratory distress. Breath sounds: Normal breath sounds. No wheezing or rales. Abdominal:      General: Bowel sounds are normal. There is no distension. Palpations: Abdomen is soft. There is no mass. Tenderness: There is no abdominal tenderness. There is no guarding or rebound. Musculoskeletal:         General: No tenderness. Normal range of motion. Cervical back: Normal range of motion and neck supple. Comments: Severe kyphosis    Skin:     General: Skin is warm and dry. Findings: No erythema or rash. Neurological:      Mental Status: She is alert and oriented to person, place, and time. Cranial Nerves: No cranial nerve deficit. Coordination: Coordination normal.      Deep Tendon Reflexes: Reflexes are normal and symmetric. Reflexes normal.   Psychiatric:         Mood and Affect: Mood is not depressed. Behavior: Behavior normal.         Thought Content:  Thought content normal.         Judgment: Judgment normal.       /70   Pulse 72   Wt 240 lb (108.9 kg)   SpO2 99%   BMI 43.90 kg/m² Assessment:      Problem List     Left sided sciatica     We will try muscle relaxant and a steroid pack          Relevant Medications    ALPRAZolam (XANAX) 0.5 MG tablet    venlafaxine (EFFEXOR) 25 MG tablet    sertraline (ZOLOFT) 25 MG tablet    cyclobenzaprine (FLEXERIL) 10 MG tablet    Neck pain    Relevant Medications    cyclobenzaprine (FLEXERIL) 10 MG tablet    Spinal stenosis at L4-L5 level - Primary (Chronic)     She is on oxycodone with Dr. Zach Wilkins:        Patient given educational materials - see patient instructions. Discussed use, benefit, and side effects of prescribed medications. Allpatient questions answered. Pt voiced understanding. Reviewed health maintenance. Instructed to continue current medications, diet and exercise. Patient agreed with treatment plan. Follow up as directed. MEDICATIONS:  Orders Placed This Encounter   Medications    cyclobenzaprine (FLEXERIL) 10 MG tablet     Sig: Take 1 tablet by mouth 3 times daily as needed for Muscle spasms     Dispense:  90 tablet     Refill:  0    methylPREDNISolone (MEDROL DOSEPACK) 4 MG tablet     Sig: Take by mouth. Dispense:  1 kit     Refill:  0         ORDERS:  Orders Placed This Encounter   Procedures    CBC with Auto Differential    Comprehensive Metabolic Panel    Lipid Panel    Vitamin D 25 Hydroxy    Urinalysis with Reflex to Culture    TSH       Follow-up:  Return in about 6 months (around 11/3/2022) for have labs done prior to appt. PATIENT INSTRUCTIONS:  Patient Instructions     arthitis  Stable for now   2. DD  Continue diclofenac   3. Left sciatica  Continue with flexeril      Electronically signed by TAMIA Talbert on 5/3/2022 at 5:10 PM      EMRDragon/transcription disclaimer:  Much of this encounter note is electronic transcription/translation of spoken language to printed texts.   The electronic translation of spoken language may be erroneous, or at times,nonsensical words or phrases may be inadvertently transcribed.   Although I have reviewed the note for such errors, some may still exist.

## 2022-05-03 NOTE — PATIENT INSTRUCTIONS
1.  arthitis  Stable for now   2. DD  Continue diclofenac talk to Dr. Aurea Castellanos about increasing your oxycodone due to the fact that you are taking way too many NSAIDs  3.   Left sciatica  Continue with flexeril and Medrol Dosepak

## 2022-05-05 LAB — URINE CULTURE, ROUTINE: NORMAL

## 2022-05-10 ENCOUNTER — OFFICE VISIT (OUTPATIENT)
Dept: OBSTETRICS AND GYNECOLOGY | Facility: CLINIC | Age: 82
End: 2022-05-10

## 2022-05-10 VITALS
WEIGHT: 240 LBS | SYSTOLIC BLOOD PRESSURE: 142 MMHG | BODY MASS INDEX: 44.16 KG/M2 | HEIGHT: 62 IN | DIASTOLIC BLOOD PRESSURE: 84 MMHG

## 2022-05-10 DIAGNOSIS — F51.01 PRIMARY INSOMNIA: ICD-10-CM

## 2022-05-10 DIAGNOSIS — F41.9 ANXIETY: ICD-10-CM

## 2022-05-10 DIAGNOSIS — F33.1 MODERATE EPISODE OF RECURRENT MAJOR DEPRESSIVE DISORDER: ICD-10-CM

## 2022-05-10 PROCEDURE — 99213 OFFICE O/P EST LOW 20 MIN: CPT | Performed by: OBSTETRICS & GYNECOLOGY

## 2022-05-10 RX ORDER — SERTRALINE HYDROCHLORIDE 25 MG/1
25 TABLET, FILM COATED ORAL DAILY
Qty: 90 TABLET | Refills: 3 | Status: SHIPPED | OUTPATIENT
Start: 2022-05-10 | End: 2023-02-28 | Stop reason: SDUPTHER

## 2022-05-10 RX ORDER — VENLAFAXINE 25 MG/1
25 TABLET ORAL DAILY
Qty: 90 TABLET | Refills: 3 | Status: SHIPPED | OUTPATIENT
Start: 2022-05-10 | End: 2023-02-28 | Stop reason: SDUPTHER

## 2022-05-10 RX ORDER — OXYCODONE HYDROCHLORIDE 15 MG/1
TABLET ORAL
Status: ON HOLD | COMMUNITY
Start: 2022-04-27 | End: 2022-08-20

## 2022-05-10 RX ORDER — MIRABEGRON 25 MG/1
25 TABLET, FILM COATED, EXTENDED RELEASE ORAL DAILY
Qty: 30 TABLET | Refills: 3 | Status: ON HOLD | OUTPATIENT
Start: 2022-05-10 | End: 2022-08-20

## 2022-05-10 RX ORDER — ALPRAZOLAM 1 MG/1
1 TABLET ORAL 3 TIMES DAILY PRN
Qty: 90 TABLET | Refills: 2 | Status: SHIPPED | OUTPATIENT
Start: 2022-05-10 | End: 2022-08-10

## 2022-05-10 NOTE — PROGRESS NOTES
"Subjective   Chief Complaint   Patient presents with   • Med Refill     Pt here for refill on Xanax and denies any problems     Jennifer Gomes is a 81 y.o. year old .  No LMP recorded (lmp unknown). Patient has had a hysterectomy.  She presents to be seen for follow-up of anxiety.  Patient accompanied by her daughter today.  She reports that her anxiety is \"a tad better\" -further explaining that her home life situation has gotten much better.  When specifically asked, she reports that her daughter is still living with her and is doing well.    Depression also well-controlled with zoloft and effexor -patient states that she has less to be depressed about at the current time.  Patient is very pleased with her medications.    The following portions of the patient's history were reviewed and updated as appropriate:current medications and allergies    Social History    Tobacco Use      Smoking status: Never Smoker      Smokeless tobacco: Never Used    Review of Systems   Constitutional: Positive for unexpected weight change (gained about 35 pounds last year, but weight now stable). Negative for activity change.   Respiratory: Negative for shortness of breath.    Cardiovascular: Negative for chest pain.   Gastrointestinal: Positive for constipation. Negative for abdominal pain (intermittent, every day at [place], takes gas-x).        + fecal incontinence   Genitourinary: Negative for difficulty urinating and enuresis (better with myrbetriq).   Musculoskeletal: Positive for arthralgias and back pain.   Psychiatric/Behavioral: Positive for dysphoric mood (stable on zoloft, although chronically depressed due to pain and difficulty ambulating now) and sleep disturbance (significant.  Pt takes 3 mg of xanax most nights, divided into two doses, in order to sleep). The patient is nervous/anxious (pt takes xanax prn during the day, but reports that is rare).          Objective   /84 (BP Location: Right arm, Patient " "Position: Sitting, Cuff Size: Large Adult)   Ht 157.5 cm (62\")   Wt 109 kg (240 lb)   LMP  (LMP Unknown)   Breastfeeding No   BMI 43.90 kg/m²     Physical Exam  Vitals and nursing note reviewed.   Constitutional:       General: She is not in acute distress.     Appearance: She is well-developed. She is obese.   HENT:      Head: Normocephalic and atraumatic.   Neck:      Thyroid: No thyromegaly.   Pulmonary:      Effort: Pulmonary effort is normal.   Abdominal:      General: There is no distension.      Palpations: Abdomen is soft.      Tenderness: There is no abdominal tenderness.   Musculoskeletal:      Cervical back: Normal range of motion.      Comments: Patient in wheelchair today   Skin:     General: Skin is warm and dry.      Comments: Skin on legs much healthier now, although still with discoloration from poor circulation.  Skin intact   Neurological:      Mental Status: She is alert and oriented to person, place, and time.   Psychiatric:         Behavior: Behavior normal.         Judgment: Judgment normal.         Lab Review   No data reviewed    Imaging   No data reviewed     Assessment & Plan    Diagnoses and all orders for this visit:    1. Anxiety: Takes 3 mg of Xanax every evening to get sleep.  She divides this into 2 doses that are 3 to 4 hours apart.  Patient will occasionally take half of a tablet during the day for anxiety, but rarely has any \"extra\" because she takes it all at night.  Patient again asks today about how to wean off of her Xanax, if she decides to in the future.  We discussed slowly decreasing her dose by one half of a milligram, and then staying at each new dose for 4 weeks before making another decrease.-     venlafaxine (EFFEXOR) 25 MG tablet; Take 1 tablet by mouth Daily.  Dispense: 90 tablet; Refill: 3  -     ALPRAZolam (XANAX) 1 MG tablet; Take 1 tablet by mouth 3 (Three) Times a Day As Needed for Anxiety.  Dispense: 90 tablet; Refill: 2    2. 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  Dispense: 90 tablet; Refill: 3    3. Morbidly obese (HCC): Patient unable to exercise due to difficulty ambulating    4. Postmenopausal    5. Primary insomnia  -     ALPRAZolam (XANAX) 1 MG tablet; Take 1 tablet by mouth 3 (Three) Times a Day As Needed for Anxiety.  Dispense: 90 tablet; Refill: 2  Diagnoses and all orders for this visit:    6. Adenocarcinoma of endometrium (HCC)  Comments:  s/p hysterectomy wtih Numnum in 2017    Patient has not had a mammogram in several years, and not had a bone density test since prior to that.  I have tried to talk her into both screening exams, but she says she is not worried about breast cancer or bone density.    This note was electronically signed.    Shasta Madrigal MD  May 10, 2022  14:12 CDT    Total time spent today with Jennifer  was 20-29 minutes (level 3).  Greater than 50% of the time was spent coordinating care, answering her questions and counseling regarding pathophysiology of her presenting problem along with plans for any diagnositc work-up and treatment.

## 2022-06-24 DIAGNOSIS — Z01.818 PRE-OP TESTING: Primary | ICD-10-CM

## 2022-06-28 ENCOUNTER — TELEPHONE (OUTPATIENT)
Dept: INTERNAL MEDICINE | Age: 82
End: 2022-06-28

## 2022-06-28 RX ORDER — METHYLPREDNISOLONE 4 MG/1
TABLET ORAL
Qty: 1 KIT | Refills: 0 | Status: SHIPPED | OUTPATIENT
Start: 2022-06-28 | End: 2022-07-04

## 2022-06-28 NOTE — PROGRESS NOTES
AUDIOMETRIC EVALUATION      Name:  Jennifer Gomes  :  1940  Age:  81 y.o.  Date of Evaluation:  2022       History:  Reason for visit:  Ms. Gomes is seen today at the request of Patricio Angeles Jr., MD for a hearing evaluation. Patient was seen by ENT provider on 2021 and 2022 and had complaints of hearing difficulties. Patient is here today with her daughter. Patient reports having difficulty hearing that was gradual over time. She explains it as being able to hear, but having a difficult time understanding. She thinks her right ear is worse than her left ear. She stated she had a bad ear infection about 6 months ago. Patient has never had her hearing tested before.    PE Tubes:  no, both ears   Other otologic surgical history: no, both ears  Tinnitus:  yes, right ear, pulsatile tinnitus at night  Dizziness:  yes  Noise Exposure: no  Aural Fullness:  no, both ears  Otalgia: no, both ears  Family history of hearing loss: yes, mother side of the family  Other significant history: high blood pressure, uterine cancer in 2020 with radiation      EVALUATION:        RESULTS:    Otoscopic Evaluation:  Bilateral: clear canal, tympanic membrane visualized     Tympanometry (226 Hz):  Bilateral: Type A- normal    Pure Tone Audiometry:    Bilateral: normal sloping to moderate sensorineural hearing loss     Speech Audiometry:   Right: Speech Reception Threshold (SRT) was obtained at 40 dBHL  Word Recognition scores- excellent/within normal limits (90 - 100%) using NU-6 List 4A, 25 words  Left: Speech Reception Threshold (SRT) was obtained at 35 dBHL  Word Recognition scores- excellent/within normal limits (90 - 100%) using NU-6 List 4A, 25 words    IMPRESSIONS:  Tympanometry showed normal middle ear pressure and static compliance, for both ears. Pure tone thresholds for both ears show a normal sloping to moderate sensorineural hearing loss, suggesting normal outer/middle ear function and abnormal  cochlear/retrocochlear function. Patient and daughter were counseled with regard to the findings.    Amplification needs:  Patient could benefit from hearing aids. Patient's word recognition scores were excellent    Diagnosis:  1. Sensorineural hearing loss (SNHL) of both ears    2. Pulsatile tinnitus of right ear    3. Dizziness         RECOMMENDATIONS/PLAN:  Follow-up recommendations per Patricio Angeles Jr., MD    Audiologic follow-up in 1 year  Hearing aid evaluation and counseling upon medical clearance and patient motivation  Use communication strategies  Use hearing protection around loud noises  Avoid silence when possible. Sleep with white noise/fan, or listen to nature sounds      EDUCATION:  Discussed results and recommendations with patient. Questions were addressed and the patient was encouraged to contact our department should concerns arise.        ARIS Caraballo  Licensed Audiologist

## 2022-06-28 NOTE — TELEPHONE ENCOUNTER
----- Message from Paco Salazar sent at 6/28/2022  2:54 PM CDT -----  Subject: Message to Provider    QUESTIONS  Information for Provider? Pt is calling in about a steroid pack that was given to her a month ago. Pt is requesting that if she may get another steroid pack due to symptoms that pt is currently dealing with pain, renato marcello PIRES t is requesting if meds may be sent over today to assist with her pain PT is not requesting a appt at this time . ---------------------------------------------------------------------------  --------------  Iam CASPER  What is the best way for the office to contact you?  OK to leave message on   voicemail  Preferred Call Back Phone Number? [phone]  ---------------------------------------------------------------------------  --------------  SCRIPT ANSWERS  undefined

## 2022-06-29 ENCOUNTER — PROCEDURE VISIT (OUTPATIENT)
Dept: OTOLARYNGOLOGY | Facility: CLINIC | Age: 82
End: 2022-06-29

## 2022-06-29 ENCOUNTER — OFFICE VISIT (OUTPATIENT)
Dept: OTOLARYNGOLOGY | Facility: CLINIC | Age: 82
End: 2022-06-29

## 2022-06-29 VITALS
SYSTOLIC BLOOD PRESSURE: 128 MMHG | WEIGHT: 240 LBS | TEMPERATURE: 97.1 F | BODY MASS INDEX: 43.9 KG/M2 | DIASTOLIC BLOOD PRESSURE: 75 MMHG

## 2022-06-29 DIAGNOSIS — J30.0 VASOMOTOR RHINITIS: ICD-10-CM

## 2022-06-29 DIAGNOSIS — J34.2 ACQUIRED DEVIATED NASAL SEPTUM: ICD-10-CM

## 2022-06-29 DIAGNOSIS — H93.A1 PULSATILE TINNITUS OF RIGHT EAR: ICD-10-CM

## 2022-06-29 DIAGNOSIS — M79.18 MYOFASCIAL PAIN ON RIGHT SIDE: ICD-10-CM

## 2022-06-29 DIAGNOSIS — H90.3 SENSORINEURAL HEARING LOSS (SNHL) OF BOTH EARS: Primary | ICD-10-CM

## 2022-06-29 DIAGNOSIS — H92.01 RIGHT EAR PAIN: ICD-10-CM

## 2022-06-29 DIAGNOSIS — R42 DIZZINESS: ICD-10-CM

## 2022-06-29 DIAGNOSIS — M26.621 ARTHRALGIA OF RIGHT TEMPOROMANDIBULAR JOINT: ICD-10-CM

## 2022-06-29 DIAGNOSIS — J34.1 MAXILLARY SINUS CYST: ICD-10-CM

## 2022-06-29 PROCEDURE — 92557 COMPREHENSIVE HEARING TEST: CPT

## 2022-06-29 PROCEDURE — 31231 NASAL ENDOSCOPY DX: CPT | Performed by: OTOLARYNGOLOGY

## 2022-06-29 PROCEDURE — 99213 OFFICE O/P EST LOW 20 MIN: CPT | Performed by: OTOLARYNGOLOGY

## 2022-06-29 PROCEDURE — 92567 TYMPANOMETRY: CPT

## 2022-06-29 RX ORDER — METHYLPREDNISOLONE 4 MG/1
TABLET ORAL
COMMUNITY
Start: 2022-06-28 | End: 2022-07-04

## 2022-06-29 NOTE — PATIENT INSTRUCTIONS
NASAL SALINE:  Use 2 puffs each nostril 4-6 times daily and more frequently if possible.  You can buy saline spray or you can make your own and use an old spray bottle to administer  Use a humidifier at bedside  Recipe for saline:  Water                                 1 quart  Salt (table)                        1 tablespoon  Gylcerin (or Jyoti Syrup)    1 teaspoon  Sodium bicarbonate           1 teaspoon  Sprays or Warden pots are recommended    Do not allow to stand for more than 24 hrs. Make new solution. There is no preservative in this solution.     Nasal steroid use:  Using nasal steroids:  You will be prescribed one of the following nasal steroids: Flonase, Nasacort, Nasonex, Rhinocort, Qnasl, Zetonna  2 puffs each nostril 2 times daily  Start as soon as possible  If you are using Afrin for 3 days with the nasal steroid,  Use Afrin first and wait 10 minutes to allow the nose to open. Then administer nasal steroids.       Ipratromium Bromide (Atrovent) spray use 1-2 puffs each nostril 3-4 times daily AS needed for drainage      See Dr Mon for hearing aids    Call for problems      CONTACT INFORMATION:  The main office phone number is [phone]. For emergencies after hours and on weekends, this number will convert over to our answering service and the on call provider will answer. Please try to keep non emergent phone calls/ questions to office hours 9am-5pm Monday through Friday.     [employer]  As an alternative, you can sign up and use the Epic MyChart system for more direct and quicker access for non emergent questions/ problems.  [facility] [employer] allows you to send messages to your doctor, view your test results, renew your prescriptions, schedule appointments, and more. To sign up, go to [employer] and click on the Sign Up Now link in the New User? box. Enter your [employer] Activation Code exactly as it appears below along with the last four digits of your Social Security Number and  your Date of Birth () to complete the sign-up process. If you do not sign up before the expiration date, you must request a new code.    [employer] Activation Code: [email or web address]  Expires: 2022  5:36 AM    If you have questions, you can email [email or web address] or call [phone] to talk to our [employer] staff. Remember, [employer] is NOT to be used for urgent needs. For medical emergencies, dial 911.

## 2022-06-29 NOTE — PROGRESS NOTES
Patricio Angeles Jr, MD  W ENT [facility] EAR NOSE & THROAT  [address], [address]  [place] [address]  Fax [phone]  Phone [phone]      Visit Type: FOLLOW UP   Chief Complaint   Patient presents with   • Follow-up     Recheck nose, drainage, and audio        HPI   Accompanied by: Daughter  She presents for a follow up evaluation. She has had audio.  She does not wish hearing aids  She says nose is better on Atrovent     Past Medical History:   Diagnosis Date   • Anxiety    • Arthritis    • Cancer (HCC)     uterine   • Chronic pain    • Depression    • Disease of thyroid gland    • Fibromyalgia    • Headache    • Hyperlipidemia    • Hypertension    • Incontinence    • Insomnia    • Leg pain    • Lumbar stenosis    • Peptic ulcer    • Restless legs    • Vaginal bleeding        Past Surgical History:   Procedure Laterality Date   • BLADDER REPAIR  2011    MESH HAD TO BE REMOVED IN 2013   • BREAST BIOPSY Right 2017    benign   • BREAST CYST EXCISION Left 1982   • CARDIAC CATHETERIZATION     • CARPAL TUNNEL RELEASE     • CATARACT EXTRACTION W/ INTRAOCULAR LENS  IMPLANT, BILATERAL     • COLONOSCOPY     • COLONOSCOPY N/A 10/1/2021    Procedure: COLONOSCOPY WITH ANESTHESIA;  Surgeon: Tom Velasco DO;  Location: [facility] ENDOSCOPY;  Service: Gastroenterology;  Laterality: N/A;  pre: change in bowel habits  post: diverticulosis. hemorrhoids.   Olivia Mora APRN       • CYSTECTOMY     • D & C HYSTEROSCOPY N/A 11/6/2017    Procedure: DILATATION AND CURETTAGE HYSTEROSCOPY;  Surgeon: Shasta Madrigal MD;  Location: [facility] OR;  Service:    • DILATION AND CURETTAGE, DIAGNOSTIC / THERAPEUTIC  2008   • ENDOSCOPY  09/23/2010    Short segment of Arriola's,Moderate chroninc esophagogastritis and negative H.pylori   • ENDOSCOPY N/A 9/25/2017    Procedure: ESOPHAGOGASTRODUODENOSCOPY WITH ANESTHESIA;  Surgeon: Tom Velasco DO;  Location: [facility] ENDOSCOPY;   Service:    • HYSTERECTOMY  12/20/2017   • ORIF TIBIA/FIBULA FRACTURES Left 2000   • TRANSVAGINAL TAPING SUSPENSION N/A 11/6/2017    Procedure: VAGINAL MESH REVISION;  Surgeon: Shasta Madrigal MD;  Location: [facility];  Service:    • VAGINAL MESH REVISION  2013       Family History: Her family history includes Cancer in her paternal grandmother; Diabetes in her mother and sister; Lung cancer in her paternal grandfather; Lymphoma in her brother; Multiple myeloma in her mother; Ovarian cancer in her paternal aunt; Prostate cancer in her brother; Stroke in her father.     Social History: She  reports that she has never smoked. She has never used smokeless tobacco. She reports current alcohol use. She reports that she does not use drugs.    Home Medications:  ALPRAZolam, Mirabegron ER, SUMAtriptan, atorvastatin, bumetanide, butalbital-acetaminophen-caffeine, carvedilol, coenzyme Q10, cyclobenzaprine, diclofenac, donepezil, ergocalciferol, ipratropium, lansoprazole, levothyroxine, methylPREDNISolone, oxyCODONE, sertraline, and venlafaxine    Allergies:  She is allergic to ropinirole hcl, codeine, definity [perflutren lipid microsphere], ambien [zolpidem], eszopiclone, pregabalin, and tizanidine.       Vital Signs:   Temp:  [97.1 °F (36.2 °C)] 97.1 °F (36.2 °C)  BP: (128)/(75) 128/75  ENT Physical Exam  Constitutional  Appearance: patient appears well-developed and well-nourished,  Communication/Voice: communication appropriate for developmental age; vocal quality normal;  Constitutional comments: Morbid obese  In wheelchair  Head and Face  Appearance: head appears normal, face appears normal and face appears atraumatic;  Palpation: facial palpation normal;  Salivary: glands normal;  Ear  Hearing: intact;  Auricles: right auricle normal; left auricle normal;  External Mastoids: right external mastoid normal; left external mastoid normal;  Ear Canals: right ear canal normal; left ear canal normal;  Tympanic Membranes: right  tympanic membrane normal; left tympanic membrane normal;  Nose  External Nose: nares patent bilaterally; external nose normal;  Internal Nose: nasal mucosa normal; septum normal; bilateral inferior turbinates normal;  Oral Cavity/Oropharynx  Lips: normal;  Teeth: normal;  Gums: gingiva normal;  Tongue: normal;  Oral mucosa: normal;  Hard palate: normal;  Soft palate: normal;  Neck  Neck: neck normal; neck palpation normal;  Thyroid: thyroid normal;  Respiratory  Inspection: breathing unlabored; normal breathing rate;  Auscultation: breath sounds are clear;  Cardiovascular  Inspection: extremities are warm and well perfused; no peripheral edema present;  Auscultation: regular rate and rhythm;  Neurovestibular  Gait: using wheelchair;  Mental Status: alert and oriented;  Psychiatric: mood normal; affect is appropriate;  Cranial Nerves: cranial nerves intact;  Drawings       Nasal endoscopy    Date/Time: 6/29/2022 10:43 AM  Performed by: Patricio Angeles Jr., MD  Authorized by: Patricio Angeles Jr., MD     Consent:     Consent obtained:  Verbal    Consent given by:  Patient  Anesthesia (see MAR for exact dosages):     Anesthesia method:  Topical application    Topical anesthetic:  Tetracaine  Procedure details:     Indications: sino-nasal symptoms      Medication:  Afrin    Instrument: rigid nasal endoscopy      Scope location: bilateral nare    Nasal cavity:     right nasal cavity not occluded and left nasal cavity not occluded      Right inferior turbinates: edema      Right inferior turbinates: not enlarged      Left inferior turbinates: edema      Left inferior turbinates: not enlarged    Septum:     Findings: normal    Sinus/ Nasopharynx:     Right middle meatus: patent and inflammation      Right middle meatus: no polyps      Left middle meatus: patent and inflammation      Left middle meatus: no polyps      patent      inflammation      patent      inflammation      patent      inflammation      patent     Post-procedure details:     Patient tolerance of procedure:  Tolerated well       Result Review    RESULTS REVIEW    I have reviewed the patients old records in the chart.   I have reviewed the patients old records in the chart.  The following results/records were reviewed:  Audiologic testing reviewed. Sleeping, high-frequency sensorineural hearing loss, moderate to severe   Procedure visit with Giovana Coe AUD (06/29/2022)      Assessment & Plan    Diagnoses and all orders for this visit:    1. Sensorineural hearing loss (SNHL) of both ears (Primary)    2. Pulsatile tinnitus of right ear    3. Dizziness    4. Maxillary sinus cyst    5. Arthralgia of right temporomandibular joint    6. Myofascial pain on right side    7. Right ear pain    8. Vasomotor rhinitis    9. Acquired deviated nasal septum  Comments:  L to R oblique, ant low, posterior mid w spur.    Other orders  -     $ Nasal / Sinus Endoscopy       Continue current management plan.  Conservative management.     Patient appears to have symmetric sensorineural hearing loss.  I recommended she seek hearing aids at this point in time.  I will refer her to hearing aid failure.  They can follow-up on a yearly basis and refer her back to me if there are any further issues.  The patient is responding to her nasal medications pain at this point in time her nasal endoscopy reveals no acute infection.  I will have her continue her nasal steroids.  Patient can follow-up with me as needed.  Atrovent  Flonase BID  Nasal saline  Hearing aid referral      My Chart:  Encouraged to enroll in My Chart  Encouraged to review data and findings in My Chart    Patient, Daughter understand(s) and agree(s) with the treatment plan as described.    Return if symptoms worsen or fail to improve, for Recheck Nose.      Patricio Angeles Jr, MD  06/29/22  10:48 CDT

## 2022-07-18 RX ORDER — DICLOFENAC SODIUM 75 MG/1
75 TABLET, DELAYED RELEASE ORAL 2 TIMES DAILY
Qty: 60 TABLET | Refills: 1 | Status: SHIPPED | OUTPATIENT
Start: 2022-07-18

## 2022-07-19 ENCOUNTER — TELEPHONE (OUTPATIENT)
Dept: INTERNAL MEDICINE | Age: 82
End: 2022-07-19

## 2022-07-19 RX ORDER — ATORVASTATIN CALCIUM 40 MG/1
TABLET, FILM COATED ORAL
Qty: 90 TABLET | Refills: 1 | Status: SHIPPED | OUTPATIENT
Start: 2022-07-19

## 2022-07-25 NOTE — TELEPHONE ENCOUNTER
Chris Beulah called requesting a refill of the below medication which has been pended for you:     Requested Prescriptions     Pending Prescriptions Disp Refills    donepezil (ARICEPT) 10 MG tablet [Pharmacy Med Name: DONEPEZIL HCL 10MG TABLET] 30 tablet 5     Sig: TAKE 1 TABLET BY MOUTH NIGHTLY       Last Appointment Date: 5/3/2022  Next Appointment Date: 11/7/2022    Allergies   Allergen Reactions    Ambien [Zolpidem Tartrate]     Codeine     Lyrica [Pregabalin]     Morphine     Requip [Ropinirole Hcl]     Tizanidine      Terrible nightmares

## 2022-07-26 RX ORDER — DONEPEZIL HYDROCHLORIDE 10 MG/1
10 TABLET, FILM COATED ORAL NIGHTLY
Qty: 30 TABLET | Refills: 5 | Status: SHIPPED | OUTPATIENT
Start: 2022-07-26 | End: 2022-10-24

## 2022-07-29 ENCOUNTER — TRANSCRIBE ORDERS (OUTPATIENT)
Dept: ADMINISTRATIVE | Facility: HOSPITAL | Age: 82
End: 2022-07-29

## 2022-07-29 DIAGNOSIS — M51.16 INTERVERTEBRAL DISC DISORDERS WITH RADICULOPATHY, LUMBAR REGION: ICD-10-CM

## 2022-07-29 DIAGNOSIS — M51.36 OTHER INTERVERTEBRAL DISC DEGENERATION, LUMBAR REGION: ICD-10-CM

## 2022-07-29 DIAGNOSIS — M51.37 OTHER INTERVERTEBRAL DISC DEGENERATION, LUMBOSACRAL REGION: ICD-10-CM

## 2022-07-29 DIAGNOSIS — M51.37 DEGENERATION OF LUMBAR OR LUMBOSACRAL INTERVERTEBRAL DISC: Primary | ICD-10-CM

## 2022-07-29 DIAGNOSIS — M48.062 SPINAL STENOSIS, LUMBAR REGION WITH NEUROGENIC CLAUDICATION: ICD-10-CM

## 2022-07-29 DIAGNOSIS — M48.061 SPINAL STENOSIS, LUMBAR REGION WITHOUT NEUROGENIC CLAUDICATION: ICD-10-CM

## 2022-08-09 DIAGNOSIS — F51.01 PRIMARY INSOMNIA: ICD-10-CM

## 2022-08-09 DIAGNOSIS — F41.9 ANXIETY: ICD-10-CM

## 2022-08-10 RX ORDER — LEVOTHYROXINE SODIUM 0.05 MG/1
TABLET ORAL
Qty: 90 TABLET | Refills: 3 | Status: SHIPPED | OUTPATIENT
Start: 2022-08-10

## 2022-08-10 RX ORDER — ALPRAZOLAM 1 MG/1
1 TABLET ORAL 3 TIMES DAILY PRN
Qty: 90 TABLET | Refills: 2 | Status: SHIPPED | OUTPATIENT
Start: 2022-08-10 | End: 2022-08-21 | Stop reason: HOSPADM

## 2022-08-10 NOTE — TELEPHONE ENCOUNTER
Jocy Patton called requesting a refill of the below medication which has been pended for you:     Requested Prescriptions     Pending Prescriptions Disp Refills    levothyroxine (SYNTHROID) 50 MCG tablet [Pharmacy Med Name: L-THYROXINE (SYNTHROID) TABS 50MCG] 90 tablet 3     Sig: TAKE 1 TABLET DAILY       Last Appointment Date: 5/3/2022  Next Appointment Date: 11/7/2022    Allergies   Allergen Reactions    Ambien [Zolpidem Tartrate]     Codeine     Lyrica [Pregabalin]     Morphine     Requip [Ropinirole Hcl]     Tizanidine      Terrible nightmares

## 2022-08-15 RX ORDER — ERGOCALCIFEROL 1.25 MG/1
CAPSULE ORAL
Qty: 12 CAPSULE | Refills: 2 | Status: SHIPPED | OUTPATIENT
Start: 2022-08-15

## 2022-08-16 ENCOUNTER — APPOINTMENT (OUTPATIENT)
Dept: MRI IMAGING | Facility: HOSPITAL | Age: 82
End: 2022-08-16

## 2022-08-19 ENCOUNTER — HOSPITAL ENCOUNTER (INPATIENT)
Facility: HOSPITAL | Age: 82
LOS: 2 days | Discharge: HOME | End: 2022-08-21
Attending: EMERGENCY MEDICINE | Admitting: INTERNAL MEDICINE

## 2022-08-19 ENCOUNTER — APPOINTMENT (OUTPATIENT)
Dept: GENERAL RADIOLOGY | Facility: HOSPITAL | Age: 82
End: 2022-08-19

## 2022-08-19 ENCOUNTER — APPOINTMENT (OUTPATIENT)
Dept: MRI IMAGING | Facility: HOSPITAL | Age: 82
End: 2022-08-19

## 2022-08-19 DIAGNOSIS — Z78.9 DECREASED ACTIVITIES OF DAILY LIVING (ADL): ICD-10-CM

## 2022-08-19 DIAGNOSIS — R29.6 FREQUENT FALLS: ICD-10-CM

## 2022-08-19 DIAGNOSIS — M48.061 SPINAL STENOSIS OF LUMBAR REGION, UNSPECIFIED WHETHER NEUROGENIC CLAUDICATION PRESENT: ICD-10-CM

## 2022-08-19 DIAGNOSIS — R53.1 GENERALIZED WEAKNESS: ICD-10-CM

## 2022-08-19 DIAGNOSIS — Z74.09 IMPAIRED MOBILITY: ICD-10-CM

## 2022-08-19 DIAGNOSIS — G89.4 CHRONIC PAIN SYNDROME: Chronic | ICD-10-CM

## 2022-08-19 DIAGNOSIS — R13.10 DYSPHAGIA, UNSPECIFIED TYPE: ICD-10-CM

## 2022-08-19 DIAGNOSIS — R41.82 ALTERED MENTAL STATUS, UNSPECIFIED ALTERED MENTAL STATUS TYPE: Primary | ICD-10-CM

## 2022-08-19 DIAGNOSIS — F41.1 GAD (GENERALIZED ANXIETY DISORDER): ICD-10-CM

## 2022-08-19 DIAGNOSIS — Z79.899 POLYPHARMACY: ICD-10-CM

## 2022-08-19 PROBLEM — E66.813 OBESITY, CLASS III, BMI 40-49.9 (MORBID OBESITY): Status: ACTIVE | Noted: 2019-07-17

## 2022-08-19 PROBLEM — Z91.89 AT RISK FOR POLYPHARMACY: Status: ACTIVE | Noted: 2022-08-19

## 2022-08-19 PROBLEM — G92.8 TOXIC METABOLIC ENCEPHALOPATHY: Status: ACTIVE | Noted: 2022-08-19

## 2022-08-19 LAB
ALBUMIN SERPL-MCNC: 3.3 G/DL (ref 3.5–5.2)
ALBUMIN/GLOB SERPL: 1.3 G/DL
ALP SERPL-CCNC: 96 U/L (ref 39–117)
ALT SERPL W P-5'-P-CCNC: 32 U/L (ref 1–33)
AMPHET+METHAMPHET UR QL: NEGATIVE
AMPHETAMINES UR QL: NEGATIVE
ANION GAP SERPL CALCULATED.3IONS-SCNC: 5 MMOL/L (ref 5–15)
AST SERPL-CCNC: 35 U/L (ref 1–32)
BARBITURATES UR QL SCN: POSITIVE
BASOPHILS # BLD AUTO: 0.01 10*3/MM3 (ref 0–0.2)
BASOPHILS NFR BLD AUTO: 0.2 % (ref 0–1.5)
BENZODIAZ UR QL SCN: POSITIVE
BILIRUB SERPL-MCNC: 0.4 MG/DL (ref 0–1.2)
BILIRUB UR QL STRIP: NEGATIVE
BUN SERPL-MCNC: 17 MG/DL (ref 8–23)
BUN/CREAT SERPL: 17 (ref 7–25)
BUPRENORPHINE SERPL-MCNC: NEGATIVE NG/ML
CALCIUM SPEC-SCNC: 9.2 MG/DL (ref 8.6–10.5)
CANNABINOIDS SERPL QL: NEGATIVE
CHLORIDE SERPL-SCNC: 105 MMOL/L (ref 98–107)
CLARITY UR: CLEAR
CO2 SERPL-SCNC: 31 MMOL/L (ref 22–29)
COCAINE UR QL: NEGATIVE
COLOR UR: ABNORMAL
CREAT SERPL-MCNC: 1 MG/DL (ref 0.57–1)
D-LACTATE SERPL-SCNC: 1.5 MMOL/L (ref 0.5–2)
DEPRECATED RDW RBC AUTO: 47.3 FL (ref 37–54)
EGFRCR SERPLBLD CKD-EPI 2021: 57.4 ML/MIN/1.73
EOSINOPHIL # BLD AUTO: 0.22 10*3/MM3 (ref 0–0.4)
EOSINOPHIL NFR BLD AUTO: 3.4 % (ref 0.3–6.2)
ERYTHROCYTE [DISTWIDTH] IN BLOOD BY AUTOMATED COUNT: 14 % (ref 12.3–15.4)
ETHANOL UR QL: <0.01 %
FERRITIN SERPL-MCNC: 39.08 NG/ML (ref 13–150)
GLOBULIN UR ELPH-MCNC: 2.6 GM/DL
GLUCOSE SERPL-MCNC: 140 MG/DL (ref 65–99)
GLUCOSE UR STRIP-MCNC: NEGATIVE MG/DL
HCT VFR BLD AUTO: 33.4 % (ref 34–46.6)
HGB BLD-MCNC: 10.5 G/DL (ref 12–15.9)
HGB UR QL STRIP.AUTO: NEGATIVE
IMM GRANULOCYTES # BLD AUTO: 0.03 10*3/MM3 (ref 0–0.05)
IMM GRANULOCYTES NFR BLD AUTO: 0.5 % (ref 0–0.5)
IRON 24H UR-MRATE: 66 MCG/DL (ref 37–145)
IRON SATN MFR SERPL: 17 % (ref 20–50)
KETONES UR QL STRIP: NEGATIVE
LDH SERPL-CCNC: 349 U/L (ref 135–214)
LEUKOCYTE ESTERASE UR QL STRIP.AUTO: NEGATIVE
LYMPHOCYTES # BLD AUTO: 1.03 10*3/MM3 (ref 0.7–3.1)
LYMPHOCYTES NFR BLD AUTO: 16.1 % (ref 19.6–45.3)
MAGNESIUM SERPL-MCNC: 1.7 MG/DL (ref 1.6–2.4)
MCH RBC QN AUTO: 29.9 PG (ref 26.6–33)
MCHC RBC AUTO-ENTMCNC: 31.4 G/DL (ref 31.5–35.7)
MCV RBC AUTO: 95.2 FL (ref 79–97)
METHADONE UR QL SCN: NEGATIVE
MONOCYTES # BLD AUTO: 0.59 10*3/MM3 (ref 0.1–0.9)
MONOCYTES NFR BLD AUTO: 9.2 % (ref 5–12)
NEUTROPHILS NFR BLD AUTO: 4.53 10*3/MM3 (ref 1.7–7)
NEUTROPHILS NFR BLD AUTO: 70.6 % (ref 42.7–76)
NITRITE UR QL STRIP: NEGATIVE
NRBC BLD AUTO-RTO: 0 /100 WBC (ref 0–0.2)
OPIATES UR QL: NEGATIVE
OXYCODONE UR QL SCN: POSITIVE
PCP UR QL SCN: NEGATIVE
PH UR STRIP.AUTO: 6 [PH] (ref 5–8)
PLATELET # BLD AUTO: 161 10*3/MM3 (ref 140–450)
PMV BLD AUTO: 11.2 FL (ref 6–12)
POTASSIUM SERPL-SCNC: 4 MMOL/L (ref 3.5–5.2)
PROCALCITONIN SERPL-MCNC: 0.19 NG/ML (ref 0–0.25)
PROPOXYPH UR QL: NEGATIVE
PROT SERPL-MCNC: 5.9 G/DL (ref 6–8.5)
PROT UR QL STRIP: NEGATIVE
RBC # BLD AUTO: 3.51 10*6/MM3 (ref 3.77–5.28)
RETICS # AUTO: 0.12 10*6/MM3 (ref 0.02–0.13)
RETICS/RBC NFR AUTO: 3.45 % (ref 0.7–1.9)
SARS-COV-2 RNA PNL SPEC NAA+PROBE: NOT DETECTED
SODIUM SERPL-SCNC: 141 MMOL/L (ref 136–145)
SP GR UR STRIP: 1.02 (ref 1–1.03)
T4 FREE SERPL-MCNC: 1.04 NG/DL (ref 0.93–1.7)
TIBC SERPL-MCNC: 378 MCG/DL (ref 298–536)
TRANSFERRIN SERPL-MCNC: 254 MG/DL (ref 200–360)
TRICYCLICS UR QL SCN: POSITIVE
TSH SERPL DL<=0.05 MIU/L-ACNC: 3.28 UIU/ML (ref 0.27–4.2)
UROBILINOGEN UR QL STRIP: ABNORMAL
WBC NRBC COR # BLD: 6.41 10*3/MM3 (ref 3.4–10.8)

## 2022-08-19 PROCEDURE — 84443 ASSAY THYROID STIM HORMONE: CPT | Performed by: EMERGENCY MEDICINE

## 2022-08-19 PROCEDURE — 81003 URINALYSIS AUTO W/O SCOPE: CPT | Performed by: EMERGENCY MEDICINE

## 2022-08-19 PROCEDURE — 80053 COMPREHEN METABOLIC PANEL: CPT | Performed by: EMERGENCY MEDICINE

## 2022-08-19 PROCEDURE — 25010000002 NALOXONE PER 1 MG: Performed by: EMERGENCY MEDICINE

## 2022-08-19 PROCEDURE — 84145 PROCALCITONIN (PCT): CPT | Performed by: EMERGENCY MEDICINE

## 2022-08-19 PROCEDURE — P9612 CATHETERIZE FOR URINE SPEC: HCPCS

## 2022-08-19 PROCEDURE — 85025 COMPLETE CBC W/AUTO DIFF WBC: CPT | Performed by: EMERGENCY MEDICINE

## 2022-08-19 PROCEDURE — 84466 ASSAY OF TRANSFERRIN: CPT | Performed by: NURSE PRACTITIONER

## 2022-08-19 PROCEDURE — 93005 ELECTROCARDIOGRAM TRACING: CPT | Performed by: EMERGENCY MEDICINE

## 2022-08-19 PROCEDURE — 82728 ASSAY OF FERRITIN: CPT | Performed by: NURSE PRACTITIONER

## 2022-08-19 PROCEDURE — 83735 ASSAY OF MAGNESIUM: CPT | Performed by: EMERGENCY MEDICINE

## 2022-08-19 PROCEDURE — 93010 ELECTROCARDIOGRAM REPORT: CPT | Performed by: INTERNAL MEDICINE

## 2022-08-19 PROCEDURE — 83605 ASSAY OF LACTIC ACID: CPT | Performed by: EMERGENCY MEDICINE

## 2022-08-19 PROCEDURE — 71045 X-RAY EXAM CHEST 1 VIEW: CPT

## 2022-08-19 PROCEDURE — 92610 EVALUATE SWALLOWING FUNCTION: CPT

## 2022-08-19 PROCEDURE — 36415 COLL VENOUS BLD VENIPUNCTURE: CPT

## 2022-08-19 PROCEDURE — 80306 DRUG TEST PRSMV INSTRMNT: CPT | Performed by: EMERGENCY MEDICINE

## 2022-08-19 PROCEDURE — 99285 EMERGENCY DEPT VISIT HI MDM: CPT

## 2022-08-19 PROCEDURE — 85045 AUTOMATED RETICULOCYTE COUNT: CPT | Performed by: NURSE PRACTITIONER

## 2022-08-19 PROCEDURE — 83615 LACTATE (LD) (LDH) ENZYME: CPT | Performed by: NURSE PRACTITIONER

## 2022-08-19 PROCEDURE — 87635 SARS-COV-2 COVID-19 AMP PRB: CPT | Performed by: EMERGENCY MEDICINE

## 2022-08-19 PROCEDURE — 72148 MRI LUMBAR SPINE W/O DYE: CPT

## 2022-08-19 PROCEDURE — 83540 ASSAY OF IRON: CPT | Performed by: NURSE PRACTITIONER

## 2022-08-19 PROCEDURE — 82077 ASSAY SPEC XCP UR&BREATH IA: CPT | Performed by: EMERGENCY MEDICINE

## 2022-08-19 PROCEDURE — 84439 ASSAY OF FREE THYROXINE: CPT | Performed by: EMERGENCY MEDICINE

## 2022-08-19 RX ORDER — PREGABALIN 75 MG/1
75 CAPSULE ORAL 2 TIMES DAILY
COMMUNITY
End: 2022-11-23

## 2022-08-19 RX ORDER — SUMATRIPTAN 50 MG/1
50 TABLET, FILM COATED ORAL 2 TIMES DAILY PRN
Status: DISCONTINUED | OUTPATIENT
Start: 2022-08-19 | End: 2022-08-21 | Stop reason: HOSPADM

## 2022-08-19 RX ORDER — CYCLOBENZAPRINE HCL 10 MG
5 TABLET ORAL 2 TIMES DAILY PRN
Status: DISCONTINUED | OUTPATIENT
Start: 2022-08-19 | End: 2022-08-21 | Stop reason: HOSPADM

## 2022-08-19 RX ORDER — ONDANSETRON 4 MG/1
4 TABLET, FILM COATED ORAL EVERY 6 HOURS PRN
Status: DISCONTINUED | OUTPATIENT
Start: 2022-08-19 | End: 2022-08-21 | Stop reason: HOSPADM

## 2022-08-19 RX ORDER — BUTALBITAL, ACETAMINOPHEN AND CAFFEINE 50; 325; 40 MG/1; MG/1; MG/1
1 TABLET ORAL EVERY 4 HOURS PRN
Status: DISCONTINUED | OUTPATIENT
Start: 2022-08-19 | End: 2022-08-21 | Stop reason: HOSPADM

## 2022-08-19 RX ORDER — ONDANSETRON 2 MG/ML
4 INJECTION INTRAMUSCULAR; INTRAVENOUS EVERY 6 HOURS PRN
Status: DISCONTINUED | OUTPATIENT
Start: 2022-08-19 | End: 2022-08-21 | Stop reason: HOSPADM

## 2022-08-19 RX ORDER — ALPRAZOLAM 0.5 MG/1
1 TABLET ORAL 2 TIMES DAILY PRN
Status: DISCONTINUED | OUTPATIENT
Start: 2022-08-19 | End: 2022-08-21 | Stop reason: HOSPADM

## 2022-08-19 RX ORDER — VENLAFAXINE 50 MG/1
25 TABLET ORAL DAILY
Status: DISCONTINUED | OUTPATIENT
Start: 2022-08-20 | End: 2022-08-21 | Stop reason: HOSPADM

## 2022-08-19 RX ORDER — ACETAMINOPHEN 325 MG/1
650 TABLET ORAL EVERY 4 HOURS PRN
Status: DISCONTINUED | OUTPATIENT
Start: 2022-08-19 | End: 2022-08-21 | Stop reason: HOSPADM

## 2022-08-19 RX ORDER — PANTOPRAZOLE SODIUM 40 MG/1
40 TABLET, DELAYED RELEASE ORAL
Status: DISCONTINUED | OUTPATIENT
Start: 2022-08-20 | End: 2022-08-21 | Stop reason: HOSPADM

## 2022-08-19 RX ORDER — CARVEDILOL 6.25 MG/1
12.5 TABLET ORAL 2 TIMES DAILY WITH MEALS
Status: DISCONTINUED | OUTPATIENT
Start: 2022-08-19 | End: 2022-08-21 | Stop reason: HOSPADM

## 2022-08-19 RX ORDER — ATORVASTATIN CALCIUM 40 MG/1
40 TABLET, FILM COATED ORAL NIGHTLY
Status: DISCONTINUED | OUTPATIENT
Start: 2022-08-19 | End: 2022-08-21 | Stop reason: HOSPADM

## 2022-08-19 RX ORDER — IPRATROPIUM BROMIDE 21 UG/1
1 SPRAY, METERED NASAL 4 TIMES DAILY PRN
Refills: 3 | Status: DISCONTINUED | OUTPATIENT
Start: 2022-08-19 | End: 2022-08-21 | Stop reason: HOSPADM

## 2022-08-19 RX ORDER — SODIUM CHLORIDE 0.9 % (FLUSH) 0.9 %
10 SYRINGE (ML) INJECTION AS NEEDED
Status: DISCONTINUED | OUTPATIENT
Start: 2022-08-19 | End: 2022-08-21 | Stop reason: HOSPADM

## 2022-08-19 RX ORDER — OXYCODONE HYDROCHLORIDE 5 MG/1
10 TABLET ORAL 4 TIMES DAILY PRN
Status: DISCONTINUED | OUTPATIENT
Start: 2022-08-19 | End: 2022-08-21 | Stop reason: HOSPADM

## 2022-08-19 RX ORDER — ACETAMINOPHEN 650 MG/1
650 SUPPOSITORY RECTAL EVERY 4 HOURS PRN
Status: DISCONTINUED | OUTPATIENT
Start: 2022-08-19 | End: 2022-08-21 | Stop reason: HOSPADM

## 2022-08-19 RX ORDER — ACETAMINOPHEN 160 MG/5ML
650 SOLUTION ORAL EVERY 4 HOURS PRN
Status: DISCONTINUED | OUTPATIENT
Start: 2022-08-19 | End: 2022-08-21 | Stop reason: HOSPADM

## 2022-08-19 RX ORDER — LEVOTHYROXINE SODIUM 0.05 MG/1
50 TABLET ORAL
Status: DISCONTINUED | OUTPATIENT
Start: 2022-08-20 | End: 2022-08-21 | Stop reason: HOSPADM

## 2022-08-19 RX ORDER — SODIUM CHLORIDE 0.9 % (FLUSH) 0.9 %
10 SYRINGE (ML) INJECTION EVERY 12 HOURS SCHEDULED
Status: DISCONTINUED | OUTPATIENT
Start: 2022-08-19 | End: 2022-08-21 | Stop reason: HOSPADM

## 2022-08-19 RX ORDER — NALOXONE HCL 0.4 MG/ML
0.4 VIAL (ML) INJECTION ONCE
Status: COMPLETED | OUTPATIENT
Start: 2022-08-19 | End: 2022-08-19

## 2022-08-19 RX ADMIN — ALPRAZOLAM 1 MG: 0.5 TABLET ORAL at 21:00

## 2022-08-19 RX ADMIN — Medication 10 ML: at 21:00

## 2022-08-19 RX ADMIN — ATORVASTATIN CALCIUM 40 MG: 40 TABLET, FILM COATED ORAL at 20:59

## 2022-08-19 RX ADMIN — CARVEDILOL 12.5 MG: 6.25 TABLET, FILM COATED ORAL at 18:11

## 2022-08-19 RX ADMIN — NALOXONE HYDROCHLORIDE 0.4 MG: 0.4 INJECTION, SOLUTION INTRAMUSCULAR; INTRAVENOUS; SUBCUTANEOUS at 10:57

## 2022-08-19 NOTE — ED PROVIDER NOTES
Subjective   This is a 81-year-old patient not a very good historian appears to be under the influence of medications has a deep voice the periorbital edema delayed reflexes came to the ER brought by the family with 1 year history of progressively worsening weakness and episodes of altered mental status this episode altered mental status RRR spasmodic come going down no exacerbating relieving factors.  At times the family states that the patient makes sense and carries intelligent conversation at the time she appears to be having difficulty in carrying on any kind interaction.  Episodes of increased somnolescent's.  And generalized fatigue for the past 1 year to the point now that she cannot get out of the bed and requires support for the past 3 months help of a walker to walk whenever she gets up and takes couple of steps she usually feels so weak that she lowers herself to the ground once she is on the floor she cannot get up or be supported up and subsequently EMS was called EMS comes to help some pick them up today the same thing happened no trauma the EMS was called and he decided come to the ER.  The family is frustrated that no answers have been provided to them for these episodes have been going on for the past 1 year most likely this is polypharmacy related will get some lab work-up she is supposed to get an MRI of the lower back and the family would like that done I think would be appropriate order MRI of the lower back since we having pain in the past MRI has not appeared normal      Altered Mental Status  Presenting symptoms: confusion, disorientation and lethargy    Severity:  Moderate  Most recent episode:  Today  Episode history:  Multiple  Timing:  Intermittent  Progression:  Waxing and waning  Chronicity:  Chronic  Context: not alcohol use, not dementia, not head injury, not homeless, not nursing home resident, not recent change in medication and not recent illness    Associated symptoms: weakness     Associated symptoms: no abdominal pain, normal movement, no bladder incontinence, no decreased appetite, no hallucinations, no headaches, no light-headedness, no seizures, no slurred speech and no suicidal behavior    Shortness of Breath  Associated symptoms: no abdominal pain, no headaches and no neck pain        Review of Systems   Constitutional: Negative for decreased appetite.   HENT: Negative.    Eyes: Negative.    Respiratory: Positive for shortness of breath.    Cardiovascular: Negative.    Gastrointestinal: Negative.  Negative for abdominal pain.   Genitourinary: Negative for bladder incontinence.   Musculoskeletal: Negative.  Negative for back pain and neck pain.   Skin: Negative.    Neurological: Positive for weakness. Negative for seizures, light-headedness and headaches.   Psychiatric/Behavioral: Positive for confusion. Negative for hallucinations.   All other systems reviewed and are negative.      Past Medical History:   Diagnosis Date   • Anxiety    • Arthritis    • Cancer (HCC)     uterine   • Chronic pain    • Depression    • Disease of thyroid gland    • Fibromyalgia    • Headache    • Hyperlipidemia    • Hypertension    • Incontinence    • Insomnia    • Leg pain    • Lumbar stenosis    • Peptic ulcer    • Restless legs    • Vaginal bleeding        Allergies   Allergen Reactions   • Ropinirole Hcl Shortness Of Breath   • Codeine Itching and Mental Status Change   • Definity [Perflutren Lipid Microsphere] Other (See Comments)     Severe back pain   • Ambien [Zolpidem] Other (See Comments)     HYPER    • Eszopiclone Other (See Comments)     MAKES PT HYPER    • Pregabalin Dizziness   • Tizanidine Other (See Comments)     Terrible nightmares       Past Surgical History:   Procedure Laterality Date   • BLADDER REPAIR  2011    MESH HAD TO BE REMOVED IN 2013   • BREAST BIOPSY Right 2017    benign   • BREAST CYST EXCISION Left 1982   • CARDIAC CATHETERIZATION     • CARPAL TUNNEL RELEASE     • CATARACT  EXTRACTION W/ INTRAOCULAR LENS  IMPLANT, BILATERAL     • COLONOSCOPY     • COLONOSCOPY N/A 10/1/2021    Procedure: COLONOSCOPY WITH ANESTHESIA;  Surgeon: Tom Velasco DO;  Location: [facility] ENDOSCOPY;  Service: Gastroenterology;  Laterality: N/A;  pre: change in bowel habits  post: diverticulosis. hemorrhoids.   Olivia Mora APRN       • CYSTECTOMY     • D & C HYSTEROSCOPY N/A 11/6/2017    Procedure: DILATATION AND CURETTAGE HYSTEROSCOPY;  Surgeon: Shasta Madrigal MD;  Location: [facility] OR;  Service:    • DILATION AND CURETTAGE, DIAGNOSTIC / THERAPEUTIC  2008   • ENDOSCOPY  09/23/2010    Short segment of Arriola's,Moderate chroninc esophagogastritis and negative H.pylori   • ENDOSCOPY N/A 9/25/2017    Procedure: ESOPHAGOGASTRODUODENOSCOPY WITH ANESTHESIA;  Surgeon: Tom Velasco DO;  Location: [facility] ENDOSCOPY;  Service:    • HYSTERECTOMY  12/20/2017   • ORIF TIBIA/FIBULA FRACTURES Left 2000   • TRANSVAGINAL TAPING SUSPENSION N/A 11/6/2017    Procedure: VAGINAL MESH REVISION;  Surgeon: Shasta Madrigal MD;  Location: [facility] OR;  Service:    • VAGINAL MESH REVISION  2013       Family History   Problem Relation Age of Onset   • Diabetes Mother    • Multiple myeloma Mother    • Stroke Father    • Diabetes Sister    • Prostate cancer Brother    • Lymphoma Brother         NHL   • Ovarian cancer Paternal Aunt    • Cancer Paternal Grandmother         metastatic   • Lung cancer Paternal Grandfather    • Colon cancer Neg Hx    • Esophageal cancer Neg Hx    • Breast cancer Neg Hx        Social History     Socioeconomic History   • Marital status:    Tobacco Use   • Smoking status: Never Smoker   • Smokeless tobacco: Never Used   Vaping Use   • Vaping Use: Never used   Substance and Sexual Activity   • Alcohol use: Yes     Comment: occasional   • Drug use: No   • Sexual activity: Defer           Objective   Physical Exam  Vitals and nursing note reviewed. Exam conducted with a chaperone present.    Constitutional:       General: She is awake. She is not in acute distress.     Appearance: Normal appearance. She is well-developed. She is obese. She is not toxic-appearing or diaphoretic.   HENT:      Head: Normocephalic and atraumatic.      Mouth/Throat:      Mouth: Mucous membranes are moist.      Pharynx: Oropharynx is clear.   Eyes:      General: Lids are normal. Lids are everted, no foreign bodies appreciated.      Pupils: Pupils are equal, round, and reactive to light.      Comments: Periorbital edema   Neck:      Thyroid: No thyromegaly.      Vascular: Normal carotid pulses. No carotid bruit or JVD.      Trachea: Trachea and phonation normal. No tracheal tenderness or tracheal deviation.      Meningeal: Brudzinski's sign and Kernig's sign absent.   Cardiovascular:      Rate and Rhythm: Normal rate and regular rhythm.      Chest Wall: PMI is displaced.      Pulses: Normal pulses. No decreased pulses.      Heart sounds: Normal heart sounds.     No S3 sounds.   Pulmonary:      Effort: Pulmonary effort is normal. No tachypnea or respiratory distress.      Breath sounds: Normal breath sounds. No stridor.   Abdominal:      General: Abdomen is flat. Bowel sounds are normal. There is no distension.      Palpations: Abdomen is soft. There is no mass.      Tenderness: There is no abdominal tenderness. There is no guarding.   Musculoskeletal:         General: Normal range of motion.      Cervical back: Full passive range of motion without pain, normal range of motion and neck supple. No rigidity.      Right lower le+ Edema present.      Left lower le+ Edema present.   Skin:     General: Skin is warm and dry.      Capillary Refill: Capillary refill takes less than 2 seconds.      Coloration: Skin is not pale.      Nails: There is no clubbing.   Neurological:      General: No focal deficit present.      Mental Status: Mental status is at baseline. She is disoriented and confused.      GCS: GCS eye subscore is  4. GCS verbal subscore is 4. GCS motor subscore is 6.      Cranial Nerves: Cranial nerves are intact. No cranial nerve deficit.      Sensory: Sensation is intact. No sensory deficit.      Motor: Motor function is intact. No abnormal muscle tone.      Deep Tendon Reflexes: Reflexes normal. Babinski sign absent on the right side. Babinski sign absent on the left side.      Reflex Scores:       Bicep reflexes are 1+ on the right side and 1+ on the left side.       Patellar reflexes are 1+ on the right side and 1+ on the left side.     Comments: Patient's generalized weakness no focal findings no hyperreflexia the patient has decreased strength in the proximal muscle of the lower extremities as compared to upper extremities but overall nonlateralizing nonfocal generalized weakness.   Psychiatric:         Behavior: Behavior is cooperative.         Procedures           ED Course  ED Course as of 08/19/22 1355   Fri Aug 19, 2022   1049 Normal sinus rhythm [TS]   1312 Similar grade 1 spondylolisthesis at the L4/L5 level with advanced  facet arthropathy and moderate disc bulging resulting in severe L4/L5  central spinal canal stenosis and mild to moderate bilateral foraminal  narrowing. No acute lumbar vertebral pathology identified.  This was discussed with patient and family [TS]   1353 Patient with generalized malaise and fatigue altered mental status with confusion frequent falls.  No sustained trauma probably has polypharmacy use giving rise to altered mental status and confusion weakness unable to get out of the bed at this time.  But more awake and alert there is no evidence of any hypothyroidism.  I have discussed this case with the hospitalist admitted to the hospital.  She also has spinal stenosis in the lumbar area which has been seen in the prior MRIs. [TS]      ED Course User Index  [TS] Sanford Rothman MD                                           MDM  Number of Diagnoses or Management Options  Diagnosis  management comments: Differential Diagnosis:  I considered toxic-metabolic etiology, hypoglycemia, hyperglycemia, diabetic ketoacidosis, drug overdose, ethanol intoxication, thiamine deficiency, hypothermia, hyponatremia, hypernatremia, organ failure, liver failure, kidney failure, thyroid failure, adrenal failure, hypoxia, hypercarbia, ischemic stroke, intracranial bleed, subarachnoid hemorrhage, closed head injury, subdural hematoma, seizure activity, syncopal episode, infectious etiology, hypertensive encephalopathy, vasculitis, thrombotic thrombocytopenic purpura and disseminated intravascular coagulation as a possible cause of altered mental status in this patient. This is a partial list of diagnoses considered.              Amount and/or Complexity of Data Reviewed  Clinical lab tests: ordered and reviewed  Tests in the radiology section of CPT®: ordered  Tests in the medicine section of CPT®: reviewed and ordered    Risk of Complications, Morbidity, and/or Mortality  Presenting problems: moderate  Diagnostic procedures: moderate  Management options: moderate        Final diagnoses:   Altered mental status, unspecified altered mental status type   Polypharmacy   Generalized weakness   Spinal stenosis of lumbar region, unspecified whether neurogenic claudication present       ED Disposition  ED Disposition     ED Disposition   Decision to Admit    Condition   --    Comment   Level of Care: Telemetry [5]   Diagnosis: Altered mental status, unspecified altered mental status type [[id number]]   Admitting Physician: YURY JUAREZ [[id number]]   Attending Physician: YURY JUAREZ [[id number]]               No follow-up provider specified.       Medication List      No changes were made to your prescriptions during this visit.          Sanford Rothman MD  08/19/22 1034       Sanford Rothman MD  08/19/22 5181

## 2022-08-19 NOTE — PLAN OF CARE
Goal Outcome Evaluation:  Plan of Care Reviewed With: patient, family           Outcome Evaluation: See note

## 2022-08-19 NOTE — PLAN OF CARE
Goal Outcome Evaluation: patient is received from ED. Alert and oriented x 4 at present, daughter is at bedside. On room air, tele,  in place, VSS, IID, voiding. Fall risk protocol, bed alarm is on.

## 2022-08-19 NOTE — THERAPY DISCHARGE NOTE
Acute Care - Speech Language Pathology   Swallow Initial Evaluation/Discharge [facility]     Patient Name: Jennifer Gomes  : 1940  MRN: [id number]  Today's Date: 2022               Admit Date: 2022    SPEECH-LANGUAGE PATHOLOGY EVALUATION - SWALLOW  Subjective: The patient was seen on this date for a Clinical Swallow evaluation.  Patient was alert and cooperative.  Significant history: Generalized malaise and fatigue, AMS, frequent falls, likely polypharymacy, spinal stenosis  Objective: Textures given included thin liquid, puree consistency and regular consistency.  Assessment: No overt s/s of aspiration. Pt had adequate mastication and clearance of oral residue with the regular solid.   SLP Findings:  Patient presents with functional swallow, without esophageal component.   Recommendations: Diet Textures: thin liquid, regular consistency food.  Medications should be taken whole with thin liquids.   Recommended Strategies: Upright for PO, small bites and sips and alternate liquids and solids. Oral care before breakfast, after all meals and PRN.  Dysphagia therapy is not recommended.  Serena Vasquez, JEANNA-SLP 2022 15:55 CDT    Visit Dx:    ICD-10-CM ICD-9-CM   1. Altered mental status, unspecified altered mental status type  R41.82 780.97   2. Polypharmacy  Z79.899 V58.69   3. Generalized weakness  R53.1 780.79   4. Spinal stenosis of lumbar region, unspecified whether neurogenic claudication present  M48.061 724.02   5. Dysphagia, unspecified type  R13.10 787.20     Patient Active Problem List   Diagnosis   • Gastroesophageal reflux disease   • Spinal stenosis, lumbar region, without neurogenic claudication   • Overweight   • Non-smoker   • Erosion of vaginal mesh (HCC)   • Adenocarcinoma of endometrium (HCC)   • Encounter for consultation   • S/P hysterectomy with oophorectomy   • Encounter for follow-up surveillance of endometrial cancer   • History of radiation therapy   • Obesity (BMI  30-39.9)   • Non-traumatic rhabdomyolysis   • Metabolic encephalopathy   • Acute renal failure superimposed on stage 3 chronic kidney disease (HCC)   • Acute respiratory failure with hypoxia and hypercapnia (HCC)   • Medical non-compliance, does not take narcotics as prescribed   • SIRS (systemic inflammatory response syndrome) (HCC)   • Chronic constipation   • Chronic pain syndrome   • Chronic prescription opiate use   • Normocytic anemia   • Hypothyroidism (acquired)   • Essential hypertension   • TMOÁS (acute kidney injury) (HCC)   • Venous insufficiency of both lower extremities   • Fluid retention   • Low blood pressure reading   • Morbidly obese (HCC)   • Chronic intractable headache   • RAFAL (obstructive sleep apnea)   • Change in bowel habits   • Altered mental status   • Altered mental status, unspecified altered mental status type     Past Medical History:   Diagnosis Date   • Anxiety    • Arthritis    • Cancer (HCC)     uterine   • Chronic pain    • Depression    • Disease of thyroid gland    • Fibromyalgia    • Headache    • Hyperlipidemia    • Hypertension    • Incontinence    • Insomnia    • Leg pain    • Lumbar stenosis    • Peptic ulcer    • Restless legs    • Vaginal bleeding      Past Surgical History:   Procedure Laterality Date   • BLADDER REPAIR  2011    MESH HAD TO BE REMOVED IN 2013   • BREAST BIOPSY Right 2017    benign   • BREAST CYST EXCISION Left 1982   • CARDIAC CATHETERIZATION     • CARPAL TUNNEL RELEASE     • CATARACT EXTRACTION W/ INTRAOCULAR LENS  IMPLANT, BILATERAL     • COLONOSCOPY     • COLONOSCOPY N/A 10/1/2021    Procedure: COLONOSCOPY WITH ANESTHESIA;  Surgeon: Tom Velasco DO;  Location: [facility] ENDOSCOPY;  Service: Gastroenterology;  Laterality: N/A;  pre: change in bowel habits  post: diverticulosis. hemorrhoids.   Olivia Mora APRN       • CYSTECTOMY     • D & C HYSTEROSCOPY N/A 11/6/2017    Procedure: DILATATION AND CURETTAGE HYSTEROSCOPY;  Surgeon: Shasta  MD Rohan;  Location: [facility] OR;  Service:    • DILATION AND CURETTAGE, DIAGNOSTIC / THERAPEUTIC  2008   • ENDOSCOPY  09/23/2010    Short segment of Arriola's,Moderate chroninc esophagogastritis and negative H.pylori   • ENDOSCOPY N/A 9/25/2017    Procedure: ESOPHAGOGASTRODUODENOSCOPY WITH ANESTHESIA;  Surgeon: Tom Velasco DO;  Location: [facility] ENDOSCOPY;  Service:    • HYSTERECTOMY  12/20/2017   • ORIF TIBIA/FIBULA FRACTURES Left 2000   • TRANSVAGINAL TAPING SUSPENSION N/A 11/6/2017    Procedure: VAGINAL MESH REVISION;  Surgeon: Shasta Madrigal MD;  Location: [facility] OR;  Service:    • VAGINAL MESH REVISION  2013       SLP Recommendation and Plan  SLP Swallowing Diagnosis: swallow WFL (08/19/22 1516)  SLP Diet Recommendation: regular textures, thin liquids (08/19/22 1516)     Monitor for Signs of Aspiration: yes, cough, gurgly voice, throat clearing, pneumonia, notify SLP if any concerns (08/19/22 1516)     Swallow Criteria for Skilled Therapeutic Interventions Met: no problems identified which require skilled intervention (08/19/22 1516)  Anticipated Discharge Disposition (SLP): unknown (08/19/22 1552)     Therapy Frequency (Swallow): evaluation only (08/19/22 1516)              Anticipated Discharge Disposition (SLP): unknown (08/19/22 1552)        Reason for Discharge: other (see comments) (Swallow WFL) (08/19/22 1552)             Plan of Care Reviewed With: patient, family (08/19/22 1552)  Outcome Evaluation: See note (08/19/22 1552)    SWALLOW EVALUATION (last 72 hours)     SLP Adult Swallow Evaluation     Row Name 08/19/22 1516                   Rehab Evaluation    Document Type discharge evaluation/summary  -MB        Subjective Information no complaints  -MB        Patient Observations alert;cooperative  -MB        Patient/Family/Caregiver Comments/Observations Female family member present  -MB                  General Information    Patient Profile Reviewed yes  -MB        Pertinent History Of  Current Problem Generalized malaise and fatigue, AMS, frequent falls, likely polypharymacy, spinal stenosis  -MB        Current Method of Nutrition NPO  -MB        Precautions/Limitations, Vision WFL with corrective lenses  -MB        Precautions/Limitations, Hearing WFL  -MB        Prior Level of Function-Communication cognitive-linguistic impairment;other (see comments)  Inconsistent deficits  -MB        Prior Level of Function-Swallowing no diet consistency restrictions  -MB        Plans/Goals Discussed with patient and family  -MB        Barriers to Rehab none identified  -MB        Patient's Goals for Discharge return to PO diet  -MB        Family Goals for Discharge patient able to return to PO diet  -MB                  Pain    Additional Documentation Pain Scale: FACES Pre/Post-Treatment (Group)  -MB                  Pain Scale: FACES Pre/Post-Treatment    Pain: FACES Scale, Pretreatment 2-->hurts little bit  -MB                  Oral Motor Structure and Function    Dentition Assessment natural, present and adequate  -MB        Secretion Management WNL/WFL  -MB        Mucosal Quality moist, healthy  -MB                  Oral Musculature and Cranial Nerve Assessment    Oral Motor General Assessment WFL  -MB                  General Eating/Swallowing Observations    Eating/Swallowing Skills fed by SLP;self-fed  -MB        Positioning During Eating upright in bed  -MB        Utensils Used spoon;straw  -MB        Consistencies Trialed regular textures;pureed;thin liquids  -MB                  Clinical Swallow Eval    Oral Prep Phase WFL  -MB        Oral Transit WFL  -MB        Oral Residue WFL  -MB        Pharyngeal Phase WFL  -MB        Esophageal Phase unremarkable  -MB        Clinical Swallow Evaluation Summary See note  -MB                  SLP Evaluation Clinical Impression    SLP Swallowing Diagnosis swallow WFL  -MB        Functional Impact no impact on function  -MB        Swallow Criteria for Skilled  Therapeutic Interventions Met no problems identified which require skilled intervention  -MB                  Recommendations    Therapy Frequency (Swallow) evaluation only  -MB        SLP Diet Recommendation regular textures;thin liquids  -MB        Recommended Precautions and Strategies upright posture during/after eating;small bites of food and sips of liquid;alternate between small bites of food and sips of liquid;general aspiration precautions  -MB        Oral Care Recommendations Oral Care BID/PRN  -MB        SLP Rec. for Method of Medication Administration meds whole;with thin liquids  -MB        Monitor for Signs of Aspiration yes;cough;gurgly voice;throat clearing;pneumonia;notify SLP if any concerns  -MB        Anticipated Discharge Disposition (SLP) unknown  -MB              User Key  (r) = Recorded By, (t) = Taken By, (c) = Cosigned By    Initials Name Effective Dates    Serena Gan CCC-SLP 06/16/21 -                 EDUCATION  The patient has been educated in the following areas:   Dysphagia (Swallowing Impairment).                 Time Calculation:    Time Calculation- SLP     Row Name 08/19/22 1554             Time Calculation- SLP    SLP Start Time 1516  -MB      SLP Stop Time 1555  -MB      SLP Time Calculation (min) 39 min  -MB      SLP Received On 08/19/22  -MB              Untimed Charges    [id number] Eval Oral Pharyng Swallow Minutes 39  -MB              Total Minutes    Untimed Charges Total Minutes 39  -MB       Total Minutes 39  -MB            User Key  (r) = Recorded By, (t) = Taken By, (c) = Cosigned By    Initials Name Provider Type    Serena Gan CCC-SLP Speech and Language Pathologist                Therapy Charges for Today     Code Description Service Date Service Provider Modifiers Qty    [id number]  ST EVAL ORAL PHARYNG SWALLOW 3 8/19/2022 Serena Vasquez CCC-SLP GN 1               SLP Discharge Summary  Anticipated Discharge Disposition (SLP):  unknown  Reason for Discharge: other (see comments) (Swallow WFL)  Progress Toward Achieving Short/long Term Goals: other (see comments) (Swallow WFL)  Discharge Destination: other (see comments) (Still in acute care)    Serena Vasquez CCC-SLP  8/19/2022

## 2022-08-19 NOTE — H&P
"    [facility] Medicine Services  HISTORY AND PHYSICAL    Date of Admission: 8/19/2022  Primary Care Physician: Olivia Mora APRN    Subjective     Chief Complaint: Weakness with frequent falls    History of Present Illness  Jennifer Gomes is a 81-year-old female with a past medical history of fibromyalgia and chronic pain syndrome for which she takes Lyrica, Flexeril, Roxicodone followed by Dr. Garcia, anxiety and depression for which she takes Xanax, Effexor and Zoloft, chronic intractable headache for which she takes Fioricet and Imitrex, hypertension, hyperlipidemia, chronic lower extremity edema -  Ace wraps have been advised by cardiology however patient has not followed through with that suggestion.  See below for complete list.  Patient presented to [facility] emergency department via EMS.  She has multiple falls for which she is unable to regain her stability and family is unable to pick her up therefore EMS is called for assistance.  EMT today felt, \"she looked worse than yesterday\", told family oxygen saturation on room air was 80%, currently on room air 93%.  He felt she should be seen by ER.  Patient has had intermittent altered mental status with bouts of somnolence.  She answers all questions appropriately.  At times she is somewhat stuporous.  Laboratory studies unremarkable.  MRI of the lumbar spine reveals severe L4/L5 central spinal canal stenosis (not a new finding), chest x-ray reveals basilar atelectasis.  She does states she is short of breath stating, I cannot take a deep breath\".  Trace bilateral lower extremity edema noted, she does have an area on the right lower anterior region above the ankle of rough, flaky, orange peel skin with mild erythema.  She has been treated multiple times for what was thought to be cellulitis however this is chronic.  She she reports chronic pain scoring at 6/8 at all times, nothing different at this time.  She " Discharge Summary    Patient: Cherrie Meredith               Sex: male          DOA: 7/20/2022         YOB: 1966      Age:  50 y.o.        LOS:  LOS: 1 day                Admit Date: 7/20/2022    Discharge Date: 7/22/2022    Primary care physician: Phi Hernandez MD    Discharge Diagnoses:    1. Non-ST Elevation Myocardial Infarction   2. Coronary artery disease,  status post cardiac catheterization with stent placement   3. Hypertension  4. Hypercholesteremia  5. Hypothyroidism  6. Morbid obesity  7. Obstructive sleep apnea on CPAP at home   8. Previous tobacco smoking  9. Prediabetic status, HgbA1c 6.%  10. Hypokalemia, replaced       Discharge Condition: Good  Disposition: home   Code Status:full code     Follow up for Primary Care Physician:  1) he needs to continue ASA, Brilinta, Lipitor, Metoprolol, losartan, please have him follow up with cardiology in 3-4 weeks  2) continue to support weight loss and lifestyle modification     Hospital Course:   50 y.o male with HTN, Hyperlipidemia, Obesity, DELFINO on CPAP, hypothyroidism, presented from home with chest pain due to exertion. In the ER, he was found to have elevated troponin, no EKG change. He was started on Heparin gtt. Cardiology consulted and did LHC with finding of CAD with large first OM occlusion which was stented with TAHIS. His LAD is 30% stenosis, ramus intermedius is 80% stenosis, Circumflex was 75% stenosis. His ECho with EF deficit. He was continue on ASA, Brilinta, Lipitor, losartan, metoprolol   He tolerated well without further symptom. He was able to ambulate bristly without further chest pain. Last 24 Hours: no chest pain, no shortness of breath. He was ambulating in the hallway without chest pain.    Echo with preserved EF  Cardiology ok for home discharge and follow up in clinic in 3 weeks     ROS:  No current fever/chills, no headache, no dizziness, no facial pain, no sinus congestion,   No swallowing pain, No chest is admitted for further evaluation treatment.    Review of Systems   A 10 point review of systems was completed, all negative except for those discussed in HPI    Past Medical History:   Past Medical History:   Diagnosis Date   • Anxiety    • Arthritis    • Cancer (HCC)     uterine   • Chronic pain    • Depression    • Disease of thyroid gland    • Fibromyalgia    • Headache    • Hyperlipidemia    • Hypertension    • Incontinence    • Insomnia    • Leg pain    • Lumbar stenosis    • Peptic ulcer    • Restless legs    • Vaginal bleeding        Past Surgical History:   Past Surgical History:   Procedure Laterality Date   • BLADDER REPAIR  2011    MESH HAD TO BE REMOVED IN 2013   • BREAST BIOPSY Right 2017    benign   • BREAST CYST EXCISION Left 1982   • CARDIAC CATHETERIZATION     • CARPAL TUNNEL RELEASE     • CATARACT EXTRACTION W/ INTRAOCULAR LENS  IMPLANT, BILATERAL     • COLONOSCOPY     • COLONOSCOPY N/A 10/1/2021    Procedure: COLONOSCOPY WITH ANESTHESIA;  Surgeon: Tom Velasco DO;  Location: [facility] ENDOSCOPY;  Service: Gastroenterology;  Laterality: N/A;  pre: change in bowel habits  post: diverticulosis. hemorrhoids.   Olivia Mora, APRN       • CYSTECTOMY     • D & C HYSTEROSCOPY N/A 11/6/2017    Procedure: DILATATION AND CURETTAGE HYSTEROSCOPY;  Surgeon: Shasta Madrigal MD;  Location: [facility] OR;  Service:    • DILATION AND CURETTAGE, DIAGNOSTIC / THERAPEUTIC  2008   • ENDOSCOPY  09/23/2010    Short segment of Arriola's,Moderate chroninc esophagogastritis and negative H.pylori   • ENDOSCOPY N/A 9/25/2017    Procedure: ESOPHAGOGASTRODUODENOSCOPY WITH ANESTHESIA;  Surgeon: Tom Velasco DO;  Location: [facility] ENDOSCOPY;  Service:    • HYSTERECTOMY  12/20/2017   • ORIF TIBIA/FIBULA FRACTURES Left 2000   • TRANSVAGINAL TAPING SUSPENSION N/A 11/6/2017    Procedure: VAGINAL MESH REVISION;  Surgeon: Shasta Madrigal MD;  Location: [facility] OR;  Service:    • VAGINAL MESH REVISION  2013       Family  pain, no palpitation, no shortness of breath, no abd pain,  No diarrhea, no urinary complaint, no leg pain or swelling     VS: Visit Vitals  /75 (BP 1 Location: Right lower arm, BP Patient Position: At rest)   Pulse 65   Temp 97.7 °F (36.5 °C)   Resp 20   Ht 6' (1.829 m)   Wt 135.2 kg (298 lb)   SpO2 98%   BMI 40.42 kg/m²      Tmax/24hrs: Temp (24hrs), Av.2 °F (36.8 °C), Min:97.7 °F (36.5 °C), Max:98.7 °F (37.1 °C)    Intake/Output Summary (Last 24 hours) at 2022 0935  Last data filed at 2022 2230  Gross per 24 hour   Intake 480 ml   Output --   Net 480 ml       Tele:   General:  Cooperative, Not in acute distress, speaks in full sentence while in bed  HEENT: PERRL, EOMI, supple neck, no JVD, dry oral mucosa  Cardiovascular: S1S2 regular, no rub/gallop   Pulmonary: Clear air entry bilaterally, no wheezing, no crackle  GI:  Soft, non tender, non distended, +bs, no guarding   Extremities:  No pedal edema, +distal pulses appreciated   Neuro: AOx3, moving all extremities, no gross deficit. Consults:   Cardiology: Dr Benji Quinones Diagnostic Studies:   XR Results (most recent):  Results from Hospital Encounter encounter on 22    XR CHEST PORT    Narrative  EXAMINATION: Chest single view    INDICATION: Chest pain    COMPARISON: 2019    FINDINGS: Single frontal view the chest obtained. Mediastinal silhouette and  pulmonary vasculature unremarkable. No confluent consolidation. No evidence of  pneumothorax. No acute osseous findings. Surgical clips in the lower neck. Impression  No acute findings. CT Results (most recent):  No results found for this or any previous visit.    22    ECHO ADULT COMPLETE 2022    Interpretation Summary  Formatting of this result is different from the original.      Contrast used: Definity. Technical qualifiers: Echo study was technically difficult due to patient's body habitus.     Left Ventricle: Low normal left History: family history includes Cancer in her paternal grandmother; Diabetes in her mother and sister; Lung cancer in her paternal grandfather; Lymphoma in her brother; Multiple myeloma in her mother; Ovarian cancer in her paternal aunt; Prostate cancer in her brother; Stroke in her father.    Social History:  reports that she has never smoked. She has never used smokeless tobacco. She reports current alcohol use. She reports that she does not use drugs.    Code Status: Patient does not wish to have intubation but she does wish to undergo CPR and defibrillation if deemed necessary.  Her daughter Lola will speak for her if unable to speak for herself      Allergies:  Allergies   Allergen Reactions   • Ropinirole Hcl Shortness Of Breath   • Codeine Itching and Mental Status Change   • Definity [Perflutren Lipid Microsphere] Other (See Comments)     Severe back pain   • Ambien [Zolpidem] Other (See Comments)     HYPER    • Eszopiclone Other (See Comments)     MAKES PT HYPER    • Pregabalin Dizziness   • Tizanidine Other (See Comments)     Terrible nightmares       Medications:  Prior to Admission medications    Medication Sig Start Date End Date Taking? Authorizing Provider   ALPRAZolam (XANAX) 1 MG tablet    Last filled 8/10/2022, #90, takes 2.5 mg nightly TAKE 1 TABLET BY MOUTH 3 (THREE) TIMES A DAY AS NEEDED FOR ANXIETY. 8/10/22  Yes Shasta Madrigal MD   butalbital-acetaminophen-caffeine (FIORICET, ESGIC) -40 MG per tablet  Last filled 7/16/2022, #60  Takes 2 times a day Take 1 tablet by mouth Every 4 (Four) Hours As Needed for Headache. 11/9/21  Yes Shasta Madrigal MD   carvedilol (COREG) 12.5 MG tablet Take 12.5 mg by mouth 2 (Two) Times a Day With Meals. 8/28/19  Yes Tamiko Gaviria MD   coenzyme Q10 100 MG capsule Take 100 mg by mouth Daily.   Yes Tamiko Gaviria MD   cyclobenzaprine (FLEXERIL) 10 MG tablet    Takes 2 times a day Take 5 mg by mouth 3 (Three) Times a Day As Needed for Muscle  ventricular systolic function with a visually estimated EF of 50%. Left ventricle size is normal. Increased wall thickness. Findings consistent with mild concentric hypertrophy. See diagram for wall motion findings. Aortic Valve: Mild regurgitation. Signed by: Luh Montes DO on 7/21/2022  2:19 PM      07/20/22    CARDIAC PROCEDURE 07/21/2022 7/21/2022    Conclusion  · Right dominant system. Dominant RCA is occluded proximally. Distal vessel fills via collaterals from left coronary circulation. · Left main is patent. · LAD is patent. There is diffuse 20-30% stenosis noted. · Ramus intermedius has a long 80% stenosis at the ostium. · Circumflex coronary artery has diffuse AV circumflex 75% stenosis. This provides collaterals to distal RCA. Obtuse marginal branch is occluded at the ostium. Distal segment fills via left to left collaterals. · Large first OM stented from 100% occlusion to residual 0% using 2.5 mm THAIS, 38 mm length. ESTEBAN-3 flow reestablished. · LVEDP at 17 mmHg. Overall left ventricular systolic function shows low normal LV function with EF around 50%. No obvious regional wall motion abnormality is noted.     Signed by: Jessica Egan MD on 7/21/2022 12:59 PM        Lab/Data Review:  Labs: Results:       Chemistry Recent Labs     07/22/22  0432 07/21/22  0952 07/20/22  2312   * 115* 92    140 143   K 3.0* 3.4* 3.5    105 104   CO2 29 29 30   BUN 15 16 18   CREA 1.04 1.24 1.44*   CA 9.3 9.9 9.7   AGAP 7 6 9   BUCR 14 13 13   AP  --  100 96   TP  --  8.3* 7.5   ALB  --  4.1 3.9   GLOB  --  4.2* 3.6   AGRAT  --  1.0 1.1      CBC w/Diff Recent Labs     07/22/22  0432 07/21/22  1434 07/21/22  0215 07/20/22  2312   WBC 9.7 9.2 10.1 10.6   RBC 4.99 5.19 4.93 5.13   HGB 14.8 15.3 14.6 15.1   HCT 44.5 45.9 43.4 46.3    206 194 220   GRANS  --  77* 77* 74*   LYMPH  --  14* 15* 17*   EOS  --  1 0 1      Coagulation Recent Labs     07/21/22  0952 07/21/22  0024   PTP 12.7  -- "Spasms.   Yes Tamiko Gaviria MD   diclofenac (VOLTAREN) 75 MG EC tablet Take 75 mg by mouth 2 (Two) Times a Day. 12/30/21  Yes Tamiko Gaviria MD   donepezil (ARICEPT) 10 MG tablet Take 10 mg by mouth Every Night.   Yes Tamiko Gaviria MD   ergocalciferol (ERGOCALCIFEROL) 59052 units capsule Take 50,000 Units by mouth. 4/17/19  Yes Tamiko Gaviria MD   ipratropium (ATROVENT) 0.06 % nasal spray 2 sprays into the nostril(s) as directed by provider 4 (Four) Times a Day As Needed for Rhinitis. For drainage 1/19/22  Yes Patricio Angeles Jr., MD   lansoprazole (PREVACID) 30 MG capsule Take 30 mg by mouth Daily. 3/19/20  Yes Tamiko Gaviria MD   Myrbetriq 25 MG tablet sustained-release 24 hour 24 hr tablet Take 1 tablet by mouth Daily. 5/10/22  Yes Shasta Madrigal MD   oxyCODONE (ROXICODONE) 15 MG immediate release tablet TAKE (1) TABLET BY MOUTH FOUR TIMES DAILY. FILL 427 4/27/22  Yes Tamiko Gaviria MD   pregabalin (LYRICA) 75 MG capsule Take 75 mg by mouth 2 (Two) Times a Day.   Yes Tamiko Gaviria MD   sertraline (ZOLOFT) 25 MG tablet Take 1 tablet by mouth Daily. 5/10/22 5/10/23 Yes Shasta Madrigal MD   SUMAtriptan (IMITREX) 50 MG tablet Take 1 tablet by mouth 2 (Two) Times a Day As Needed for Migraine. 7/29/19  Yes Shasta Madrigal MD   venlafaxine (EFFEXOR) 25 MG tablet Take 1 tablet by mouth Daily. 5/10/22  Yes Shasta Madrigal MD   atorvastatin (LIPITOR) 40 MG tablet  4/22/20   Tamiko Gaviria MD   levothyroxine (SYNTHROID, LEVOTHROID) 50 MCG tablet Take 50 mcg by mouth Every Morning.    Tamiko Gaviria MD     I have utilized all available immediate resources to obtain, update, and review the patient's current medications.    Objective     /54 (BP Location: Left arm, Patient Position: Lying)   Pulse 77   Temp 98.1 °F (36.7 °C) (Oral)   Resp 18   Ht 157.5 cm (62\")   Wt 109 kg (240 lb)   LMP  (LMP Unknown)   SpO2 100%   BMI 43.90 kg/m² " INR 0.9  --    APTT 55.3* 28.6       Iron/Ferritin No results for input(s): IRON in the last 72 hours. No lab exists for component: TIBCCALC   BNP No results for input(s): BNPP in the last 72 hours. Cardiac Enzymes No results for input(s): CPK, CKND1, MAMADOU in the last 72 hours. No lab exists for component: CKRMB, TROIP   Liver Enzymes Recent Labs     07/21/22  0952   TP 8.3*   ALB 4.1         Thyroid Studies Recent Labs     07/21/22  0952   TSH 14.90*          All Micro Results       None                  Medications at discharge  including reasons for change and indications for new ones:   Current Discharge Medication List        START taking these medications    Details   aspirin 81 mg chewable tablet Take 1 Tablet by mouth in the morning. Indications: treatment to prevent a heart attack  Qty: 30 Tablet, Refills: 3  Start date: 7/23/2022      metoprolol tartrate (LOPRESSOR) 25 mg tablet Take 1 Tablet by mouth two (2) times a day. Indications: a heart attack  Qty: 60 Tablet, Refills: 3  Start date: 7/22/2022      ticagrelor (BRILINTA) 90 mg tablet Take 1 Tablet by mouth two (2) times a day. Indications: treatment to prevent a heart attack  Qty: 60 Tablet, Refills: 1  Start date: 7/22/2022           CONTINUE these medications which have CHANGED    Details   atorvastatin (LIPITOR) 40 mg tablet Take 1 Tablet by mouth nightly. Indications: excessive fat in the blood, treatment to prevent a heart attack  Qty: 30 Tablet, Refills: 3  Start date: 7/22/2022    Associated Diagnoses: Hypercholesterolemia           CONTINUE these medications which have NOT CHANGED    Details   levothyroxine (SYNTHROID) 150 mcg tablet Take 1 Tablet by mouth Daily (before breakfast). Qty: 90 Tablet, Refills: 1      amLODIPine (NORVASC) 5 mg tablet Take 1 Tablet by mouth daily. Qty: 90 Tablet, Refills: 0    Associated Diagnoses: Essential hypertension      losartan (COZAAR) 100 mg tablet Take 1 Tablet by mouth daily.   Qty: 90   Physical Exam  Vitals reviewed.   Constitutional:       Appearance: She is obese. She is ill-appearing.   HENT:      Head: Normocephalic and atraumatic.      Mouth/Throat:      Mouth: Mucous membranes are moist.      Pharynx: Oropharynx is clear.   Eyes:      Extraocular Movements: Extraocular movements intact.      Pupils: Pupils are equal, round, and reactive to light.   Cardiovascular:      Rate and Rhythm: Normal rate and regular rhythm.   Pulmonary:      Effort: Pulmonary effort is normal.      Breath sounds: Normal breath sounds.   Abdominal:      Palpations: Abdomen is soft.      Comments: Protuberant   Musculoskeletal:      Cervical back: Normal range of motion and neck supple.      Right lower leg: Edema ( Trace) present.      Left lower leg: Edema ( Trace) present.      Comments: Generalized weakness and debility   Skin:     General: Skin is warm and dry.      Findings: Erythema ( Palm-sized area right lower extremity anteriorly scaly, orange peel type skin-chronic) present.   Neurological:      Mental Status: She is alert.      Comments: No focal deficits, answers questions appropriately with intermittent episodes of stuporous behavior   Psychiatric:      Comments: Flat affect, no behavioral outburst       Pertinent Data:   Lab Results (last 72 hours)     Procedure Component Value Units Date/Time    COVID-19,[facility] IN-HOUSE,Nasal Swab No Transport Media 3-4 HR TAT - Swab, Nasal Cavity [[id number]]  (Normal) Collected: 08/19/22 1030    Specimen: Swab from Nasal Cavity Updated: 08/19/22 1140     COVID19 Not Detected    Urine Drug Screen - Urine, Catheter [[id number]]  (Abnormal) Collected: 08/19/22 1110    Specimen: Urine, Catheter Updated: 08/19/22 1133     THC, Screen, Urine Negative     Phencyclidine (PCP), Urine Negative     Cocaine Screen, Urine Negative     Methamphetamine, Ur Negative     Opiate Screen Negative     Amphetamine Screen, Urine Negative     Benzodiazepine Screen, Urine Positive      Tablet, Refills: 0    Associated Diagnoses: Essential hypertension      hydroCHLOROthiazide (HYDRODIURIL) 25 mg tablet Take 1 Tablet by mouth nightly. Qty: 90 Tablet, Refills: 0    Associated Diagnoses: Essential hypertension      cpap machine kit by Does Not Apply route. Overnight CPAP at 16 CWP with ramp and humidifier. Mask: Eson Medium or mask of choice. Supplies 99 month. Please send compliance data [id number]. Diagnosis DELFINO.  AHI 38 RDI 43  Qty: 1 Kit, Refills: 0           STOP taking these medications       omeprazole (PRILOSEC) 20 mg capsule Comments:   Reason for Stopping:                       Pending laboratory work and tests: none    Activity: Activity as tolerated, no heavy exertion or exercise for 2 weeks     Diet: Cardiac Diet, Diabetic Diet, and Low fat, Low cholesterol    Wound Care: None needed      Time spent >30 minutes  Komal Pardo MD  7/22/2022  9:35 AM Tricyclic Antidepressants Screen Positive     Methadone Screen, Urine Negative     Barbiturates Screen, Urine Positive     Oxycodone Screen, Urine Positive     Propoxyphene Screen Negative     Buprenorphine, Screen, Urine Negative    Urinalysis With Culture If Indicated - Urine, Catheter [[id number]]  (Abnormal) Collected: 08/19/22 1111    Specimen: Urine, Catheter Updated: 08/19/22 1127     Color, UA Dark Yellow     Appearance, UA Clear     pH, UA 6.0     Specific Gravity, UA 1.019     Glucose, UA Negative     Ketones, UA Negative     Bilirubin, UA Negative     Blood, UA Negative     Protein, UA Negative     Leuk Esterase, UA Negative     Nitrite, UA Negative     Urobilinogen, UA 1.0 E.U./dL    TSH [[id number]]  (Normal) Collected: 08/19/22 1040    Specimen: Blood from Arm, Right Updated: 08/19/22 1117     TSH 3.280 uIU/mL     Procalcitonin [[id number]]  (Normal) Collected: 08/19/22 1040    Specimen: Blood from Arm, Right Updated: 08/19/22 1116     Procalcitonin 0.19 ng/mL     T4, Free [[id number]]  (Normal) Collected: 08/19/22 1040    Specimen: Blood from Arm, Right Updated: 08/19/22 1116     Free T4 1.04 ng/dL     Narrative:      Results may be falsely increased if patient taking Biotin.      Comprehensive Metabolic Panel [[id number]]  (Abnormal) Collected: 08/19/22 1040    Specimen: Blood from Arm, Right Updated: 08/19/22 1111     Glucose 140 mg/dL      BUN 17 mg/dL      Creatinine 1.00 mg/dL      Sodium 141 mmol/L      Potassium 4.0 mmol/L      Comment: Slight hemolysis detected by analyzer. Results may be affected.        Chloride 105 mmol/L      CO2 31.0 mmol/L      Calcium 9.2 mg/dL      Total Protein 5.9 g/dL      Albumin 3.30 g/dL      ALT (SGPT) 32 U/L      AST (SGOT) 35 U/L      Alkaline Phosphatase 96 U/L      Total Bilirubin 0.4 mg/dL      Globulin 2.6 gm/dL      A/G Ratio 1.3 g/dL      BUN/Creatinine Ratio 17.0     Anion Gap 5.0 mmol/L      eGFR 57.4 mL/min/1.73     Lactic Acid, Plasma [[id number]]   (Normal) Collected: 08/19/22 1040    Specimen: Blood from Arm, Right Updated: 08/19/22 1109     Lactate 1.5 mmol/L     Ethanol [[id number]] Collected: 08/19/22 1040    Specimen: Blood from Arm, Right Updated: 08/19/22 1106     Ethanol % <0.010 %     Narrative:      Not for legal purposes. Chain of Custody not followed.     Magnesium [[id number]]  (Normal) Collected: 08/19/22 1040    Specimen: Blood from Arm, Right Updated: 08/19/22 1105     Magnesium 1.7 mg/dL     CBC Auto Differential [[id number]]  (Abnormal) Collected: 08/19/22 1040    Specimen: Blood from Arm, Right Updated: 08/19/22 1050     WBC 6.41 10*3/mm3      RBC 3.51 10*6/mm3      Hemoglobin 10.5 g/dL      Hematocrit 33.4 %      MCV 95.2 fL      MCH 29.9 pg      MCHC 31.4 g/dL      RDW 14.0 %      RDW-SD 47.3 fl      MPV 11.2 fL      Platelets 161 10*3/mm3      Neutrophil % 70.6 %      Lymphocyte % 16.1 %      Monocyte % 9.2 %      Eosinophil % 3.4 %      Basophil % 0.2 %      Immature Grans % 0.5 %      Neutrophils, Absolute 4.53 10*3/mm3      Lymphocytes, Absolute 1.03 10*3/mm3      Monocytes, Absolute 0.59 10*3/mm3      Eosinophils, Absolute 0.22 10*3/mm3      Basophils, Absolute 0.01 10*3/mm3      Immature Grans, Absolute 0.03 10*3/mm3      nRBC 0.0 /100 WBC             Assessment / Plan     Assessment:   Active Hospital Problems    Diagnosis    • **Toxic metabolic encephalopathy    • Polypharmacy    • Frequent falls    • Spinal stenosis, lumbar region, without neurogenic claudication    • Chronic pain syndrome    • Normocytic anemia    • Chronic intractable headache    • Obesity, Class III, BMI 40-49.9 (morbid obesity) ([facility])    • Venous insufficiency of both lower extremities        Plan:   1.  Admit as inpatient  2.  Consult neurosurgery  3.  Consult PT and OT for strengthening  4.  Home medications reviewed and restarted with following changes:   *Decrease alprazolam to 1 mg twice daily as needed or 2 mg at bedtime as needed   *Decrease Flexeril to 5  mg twice daily as needed   *Hold donepezil due to frequent side effects of headache, dizziness and somnolence   *Hold Myrbetriq due to frequent side effects of arthralgia and headaches   *Decrease oxycodone to 10 mg 4 times a day as needed for pain  5.  DVT prophylaxis with SCDs  6.  Supplemental oxygen as needed, incentive spirometry, continuous pulse oximetry  7.  Labs in a.m., anemia studies  8.  Consult , would benefit from home health  9.  Consult dietitian weight loss diet  10.  Seen by speech therapy in the emergency department recommendations: upright for PO, small bites and sips and alternate liquids and solids. Oral care before breakfast, after all meals and PRN.  Dysphagia therapy is not recommended.  Serena Vasquez, JEANNA-SLP 8/19/2022 15:55 CDT    I discussed the patient's findings and my recommendations with: Angel Hay DO  Time spent 50 minutes    Patient seen and examined by me on 8/19/2022 at 3:30 PM.    Electronically signed by MORGAN Cody, 08/19/22, 17:15 CDT.    This visit was performed by both a physician and an APC. I personally evaluated and examined the patient. I performed all aspects of the MDM as documented.    Discussed with her nurse, Fatoumata.     Agree with decreases in the sedating, altering medications.    Agree with neurosurgery consultation for longstanding problems with spinal stenosis.  However, unlikely to have any surgical intervention in the near future.    PT/OT.    Possible placement versus home health.    Electronically signed by Angel Hay DO, 8/19/2022, 17:24 CDT.

## 2022-08-20 LAB
ANION GAP SERPL CALCULATED.3IONS-SCNC: 5 MMOL/L (ref 5–15)
BUN SERPL-MCNC: 15 MG/DL (ref 8–23)
BUN/CREAT SERPL: 18.5 (ref 7–25)
CALCIUM SPEC-SCNC: 9.1 MG/DL (ref 8.6–10.5)
CHLORIDE SERPL-SCNC: 104 MMOL/L (ref 98–107)
CHOLEST SERPL-MCNC: 230 MG/DL (ref 0–200)
CO2 SERPL-SCNC: 32 MMOL/L (ref 22–29)
CREAT SERPL-MCNC: 0.81 MG/DL (ref 0.57–1)
DEPRECATED RDW RBC AUTO: 46.9 FL (ref 37–54)
EGFRCR SERPLBLD CKD-EPI 2021: 73.9 ML/MIN/1.73
ERYTHROCYTE [DISTWIDTH] IN BLOOD BY AUTOMATED COUNT: 14.2 % (ref 12.3–15.4)
GLUCOSE SERPL-MCNC: 115 MG/DL (ref 65–99)
HBA1C MFR BLD: 5 % (ref 4.8–5.6)
HCT VFR BLD AUTO: 31 % (ref 34–46.6)
HDLC SERPL-MCNC: 41 MG/DL (ref 40–60)
HGB BLD-MCNC: 10 G/DL (ref 12–15.9)
LDLC SERPL CALC-MCNC: 152 MG/DL (ref 0–100)
LDLC/HDLC SERPL: 3.61 {RATIO}
MCH RBC QN AUTO: 30.5 PG (ref 26.6–33)
MCHC RBC AUTO-ENTMCNC: 32.3 G/DL (ref 31.5–35.7)
MCV RBC AUTO: 94.5 FL (ref 79–97)
PLATELET # BLD AUTO: 158 10*3/MM3 (ref 140–450)
PMV BLD AUTO: 11.3 FL (ref 6–12)
POTASSIUM SERPL-SCNC: 3.9 MMOL/L (ref 3.5–5.2)
RBC # BLD AUTO: 3.28 10*6/MM3 (ref 3.77–5.28)
SODIUM SERPL-SCNC: 141 MMOL/L (ref 136–145)
TRIGL SERPL-MCNC: 205 MG/DL (ref 0–150)
VLDLC SERPL-MCNC: 37 MG/DL (ref 5–40)
WBC NRBC COR # BLD: 5.19 10*3/MM3 (ref 3.4–10.8)

## 2022-08-20 PROCEDURE — 85027 COMPLETE CBC AUTOMATED: CPT | Performed by: NURSE PRACTITIONER

## 2022-08-20 PROCEDURE — 83036 HEMOGLOBIN GLYCOSYLATED A1C: CPT | Performed by: NURSE PRACTITIONER

## 2022-08-20 PROCEDURE — 99221 1ST HOSP IP/OBS SF/LOW 40: CPT | Performed by: NEUROLOGICAL SURGERY

## 2022-08-20 PROCEDURE — 80061 LIPID PANEL: CPT | Performed by: NURSE PRACTITIONER

## 2022-08-20 PROCEDURE — 80048 BASIC METABOLIC PNL TOTAL CA: CPT | Performed by: NURSE PRACTITIONER

## 2022-08-20 RX ORDER — SIMETHICONE 125 MG
125 TABLET,CHEWABLE ORAL EVERY 6 HOURS PRN
COMMUNITY
End: 2023-01-16

## 2022-08-20 RX ORDER — LOPERAMIDE HYDROCHLORIDE 2 MG/1
2 CAPSULE ORAL 4 TIMES DAILY PRN
COMMUNITY
End: 2022-11-23

## 2022-08-20 RX ORDER — OXYCODONE HYDROCHLORIDE 15 MG/1
15 TABLET ORAL EVERY 6 HOURS
COMMUNITY
End: 2022-08-21 | Stop reason: HOSPADM

## 2022-08-20 RX ORDER — DIPHENHYDRAMINE HCL 25 MG
25 CAPSULE ORAL EVERY 6 HOURS PRN
COMMUNITY

## 2022-08-20 RX ORDER — MECLIZINE HCL 12.5 MG/1
25-50 TABLET ORAL DAILY PRN
COMMUNITY
End: 2022-11-23

## 2022-08-20 RX ORDER — BUTALBITAL, ACETAMINOPHEN AND CAFFEINE 50; 325; 40 MG/1; MG/1; MG/1
1 TABLET ORAL 2 TIMES DAILY PRN
COMMUNITY

## 2022-08-20 RX ORDER — BUMETANIDE 1 MG/1
1 TABLET ORAL AS NEEDED
COMMUNITY

## 2022-08-20 RX ADMIN — Medication 10 ML: at 08:28

## 2022-08-20 RX ADMIN — VENLAFAXINE 25 MG: 50 TABLET ORAL at 08:27

## 2022-08-20 RX ADMIN — ATORVASTATIN CALCIUM 40 MG: 40 TABLET, FILM COATED ORAL at 20:57

## 2022-08-20 RX ADMIN — CARVEDILOL 12.5 MG: 6.25 TABLET, FILM COATED ORAL at 17:33

## 2022-08-20 RX ADMIN — ALPRAZOLAM 1 MG: 0.5 TABLET ORAL at 20:58

## 2022-08-20 RX ADMIN — PANTOPRAZOLE SODIUM 40 MG: 40 TABLET, DELAYED RELEASE ORAL at 05:40

## 2022-08-20 RX ADMIN — SERTRALINE HYDROCHLORIDE 25 MG: 50 TABLET, FILM COATED ORAL at 08:26

## 2022-08-20 RX ADMIN — OXYCODONE 10 MG: 5 TABLET ORAL at 15:05

## 2022-08-20 RX ADMIN — BUTALBITAL, ACETAMINOPHEN AND CAFFEINE 1 TABLET: 50; 325; 40 TABLET ORAL at 18:50

## 2022-08-20 RX ADMIN — OXYCODONE 10 MG: 5 TABLET ORAL at 09:24

## 2022-08-20 RX ADMIN — CARVEDILOL 12.5 MG: 6.25 TABLET, FILM COATED ORAL at 08:27

## 2022-08-20 RX ADMIN — LEVOTHYROXINE SODIUM 50 MCG: 50 TABLET ORAL at 05:40

## 2022-08-20 NOTE — PLAN OF CARE
Goal Outcome Evaluation:            A&O2-4.  Slurred speech. No C/O pain.  Tele in place NSR.  2 L NC while sleeping.  Up with walker x2 to bsc.  Bed alarm on.  Resting well.

## 2022-08-20 NOTE — CASE MANAGEMENT/SOCIAL WORK
Discharge Planning Assessment  [facility]     Patient Name: Jennifer Gomes  MRN: [id number]  Today's Date: 8/20/2022    Admit Date: 8/19/2022     Discharge Needs Assessment     Row Name 08/20/22 0852       Living Environment    People in Home spouse;child(sebastian), adult    Name(s) of People in Home [place]- spouse, daughter Ivonne    Current Living Arrangements home    Primary Care Provided by self;spouse/significant other;child(sebastian)    Provides Primary Care For no one, unable/limited ability to care for self    Family Caregiver if Needed spouse;child(sebastian), adult    Quality of Family Relationships supportive;involved;helpful    Able to Return to Prior Arrangements yes       Resource/Environmental Concerns    Resource/Environmental Concerns none       Transition Planning    Patient/Family Anticipates Transition to home with family;home with help/services    Patient/Family Anticipated Services at Transition home health care    Transportation Anticipated family or friend will provide       Discharge Needs Assessment    Readmission Within the Last 30 Days no previous admission in last 30 days    Equipment Currently Used at Home rollator  bsc available    Concerns to be Addressed basic needs;discharge planning    Anticipated Changes Related to Illness none    Equipment Needed After Discharge none    Outpatient/Agency/Support Group Needs homecare agency    Discharge Facility/Level of Care Needs home with home health    Provided Post Acute Provider List? Yes    Post Acute Provider List Home Health    Provided Post Acute Provider Quality & Resource List? Yes    Post Acute Provider Quality and Resource List Home Health    Delivered To Support Person    Method of Delivery Telephone    Current Discharge Risk chronically ill               Discharge Plan     Row Name 08/20/22 0854       Plan    Plan Marcos MCCORMICK(requested)    Patient/Family in Agreement with Plan yes    Plan Comments Called to speak with Adams Gomes Spouse [phone],  he gave phone to daughter- Ivonne to answer questions. Pt lives at home with spouse/dtr and plans same, they do not want placement. They are open to HH care and prefer [facility]. Pt has RX coverage/PCP.  No other needs identified at present. Will follow.              Continued Care and Services - Admitted Since 8/19/2022    Coordination has not been started for this encounter.          Demographic Summary    No documentation.                Functional Status    No documentation.                Psychosocial    No documentation.                Abuse/Neglect    No documentation.                Legal    No documentation.                Substance Abuse    No documentation.                Patient Forms    No documentation.                   OSKAR Aranda

## 2022-08-20 NOTE — PLAN OF CARE
Goal Outcome Evaluation:   A&O2-4.  No C/O pain.  Speech is clear.  Ambulating to bathroom with walker and Ax2.  Tele in place NSR.  2 L NC.  Bed alarm on.  Safety maintained.

## 2022-08-20 NOTE — CONSULTS
NEUROSURGERY INITIAL HOSPITAL ENCOUNTER    Assessment/Plan:   Jennifer Gomes is a 81 y.o. female with a significant comorbidity Anxiety, Arthritis, Chronic pain, Depression, Disease of thyroid gland, Fibromyalgia, Headache, Hyperlipidemia, Hypertension, Incontinence, Insomnia, Leg pain, Lumbar stenosis, Peptic ulcer, Restless legs.  She presents with a new problem of altered mental status. Physical exam findings of dorsiflexion plantarflexion weakness and sensory loss from the knees downward.  Their imaging shows grade 1 spondylolisthesis with severe stenosis at L4/5 which has been stable since 2019..    Differential Diagnosis:   Lumbar Spondylolisthesis, L4/5  Lumbar Stenosis secondary to above, L4/5    Medical Decision Making:  Treatment Recommendations  1. Continued conservative care  2. Surgical decompression and fusion - recommended     Fusion Technique  1. Posterior Lateral Fusion (PLF)  2. PLF with Pedicle Screws  3. Transforaminal Interbody Fusion (TLIF)  4. Direct Lateral Interbody Fusion (DLIF)  5. Anterior Lumbar Interbody Fusion (ALIF)    Guidelines for  Fusion  1. Interbody fusion is recommended as an option to enhance the fusion rate.  (multiple Level II reports; B).   2. Posterolateral lumbar fusion (PLF) plus interbody fusion is not recommended since the evidence indicates no substantial benefit but an increased rate of complications if a PLF is added to an interbody fusion (Level II and III reports; B).  3. Anterior lumbar interbody fusion has better clinical outcomes and fewer perioperative morbidities than instrumented PLF (Level III evidence from 2 reports; C).  4. Pedicle screw fixation is recommended when posterolateral lumbar fusion (PLF) is used to manage low-back pain in patients at high risk for pseudarthrosis (Level II reports; Grade B).  5. Routine use of pedicle screw fixation as an adjunct to PLF for patients with degenerative disc disease is an option. There is consistent evidence that  the use of pedicle screws enhances the fusion rate.  6. The use of a brace following instrumented posterolateral lumbar fusion (PLF) for lumbar spondylosis is not supported due to equivalent outcomes with and without bracing (single Level II study; C).      Recommendations:  Caesar is a medically complex patient with a long history of fibromyalgia and chronic pain.  Regardless she has severe stenosis at L4/5 secondary to spondylolisthesis.  We briefly discussed conservative management versus surgical intervention.  Unfortunately she still remains slightly confused.  I would like her to get over her acute illness.  She may follow-up in clinic for surgical discussion in the presence of her daughter.  Currently her stenosis appears to be relatively unchanged since at least 2019.  She still maintains ambulation and denies bowel or bladder incontinence.  No role for emergent surgical decompression at this time. In the meantime she should have outpatient PT/OT for strengthening and gait training.     I discussed the patient's findings and my recommendations with patient    Thank you very much for this interesting consult.     Level of Risk: High due to:  severe exacerbation of chronic illness  MDM: High  Mod = 16219, Cmcn=[id number]  ___________________________________________________________________    Reason for consult:  Lumbar stenosis  Consult Requested by: Dr. Hay     Chief Complaint:   Chief Complaint   Patient presents with   • Altered Mental Status   • Shortness of Breath       HPI: Jennifer Gomes is a 81 y.o. female with a significant comorbidity of fibromyalgia and chronic pain syndrome for which she takes Lyrica, Flexeril, Roxicodone followed by Dr. Garcia, anxiety and depression for which she takes Xanax, Effexor and Zoloft, chronic intractable headache for which she takes Fioricet and Imitrex, hypertension, hyperlipidemia, chronic lower extremity edema -  Ace wraps have been advised by cardiology however  "patient has not followed through with that suggestion.       Patient presented to [facility] emergency department via EMS.  She has multiple falls for which she is unable to regain her stability and family is unable to pick her up therefore EMS is called for assistance.  EMT today felt, \"she looked worse than yesterday\", told family oxygen saturation on room air was 80%, currently on room air 93%.  He felt she should be seen by ER.  Patient has had intermittent altered mental status with bouts of somnolence.  She answers all questions appropriately.  At times she is somewhat stuporous.  Laboratory studies unremarkable.      Previously seen by Dr. Lucio on 8/2017 and as an inpatient consult by me 4/2019 for spondylolisthesis at L4/5.  She refused surgery both times. She has followed with Dr. Garcia for more than 20 years.      MRI of the lumbar spine reveals severe L4/L5 central spinal canal stenosis (not a new finding),     She she reports chronic pain scoring at 6-8/10 at all times, nothing different at this time.  She is admitted for further evaluation treatment of confusion and respiratory issues.    Currently Jennifer states that she does not have any back pain.  She does have profound numbness from her knees downward bilaterally and weakness particularly dorsiflexion plantarflexion.  She is walked with a Rollator for the last 2 years.  She emphatically denies any bowel or bladder incontinence.  When asked about falls she states that she does not fall but she cannot slides to the ground.  She says that her daughter will state that she does fall and tends to over exaggerate.  She is not currently interested in surgery.    Review of Systems   HENT: Negative.    Eyes: Negative.    Respiratory: Negative.    Cardiovascular: Negative.    Gastrointestinal: Negative.    Endocrine: Negative.    Genitourinary: Negative.    Musculoskeletal: Positive for gait problem.   Skin: Negative.    Allergic/Immunologic: " Negative.    Neurological: Positive for weakness and numbness.   Hematological: Negative.    Psychiatric/Behavioral: Positive for confusion.        Past Medical History:  has a past medical history of Anxiety, Arthritis, Cancer (HCC), Chronic pain, Depression, Disease of thyroid gland, Fibromyalgia, Headache, Hyperlipidemia, Hypertension, Incontinence, Insomnia, Leg pain, Lumbar stenosis, Peptic ulcer, Restless legs, and Vaginal bleeding.    Past Surgical History:  has a past surgical history that includes Cystectomy; Esophagogastroduodenoscopy (09/23/2010); Dilation and curettage, diagnostic / therapeutic (2008); Bladder repair (2011); ORIF tibia & fibula fractures (Left, 2000); Vaginal Mesh Revision (2013); Cataract extraction w/ intraocular lens  implant, bilateral; Carpal tunnel release; Esophagogastroduodenoscopy (N/A, 9/25/2017); transvaginal taping suspension (N/A, 11/6/2017); d & c hysteroscopy (N/A, 11/6/2017); Hysterectomy (12/20/2017); Breast cyst excision (Left, 1982); Cardiac catheterization; Breast biopsy (Right, 2017); Colonoscopy; and Colonoscopy (N/A, 10/1/2021)..      Family History: family history includes Cancer in her paternal grandmother; Diabetes in her mother and sister; Lung cancer in her paternal grandfather; Lymphoma in her brother; Multiple myeloma in her mother; Ovarian cancer in her paternal aunt; Prostate cancer in her brother; Stroke in her father.     Social History:  reports that she has never smoked. She has never used smokeless tobacco. She reports current alcohol use. She reports that she does not use drugs.    Allergies: Ropinirole hcl, Codeine, Definity [perflutren lipid microsphere], Ambien [zolpidem], Eszopiclone, Pregabalin, and Tizanidine    Home Medications:   Current Facility-Administered Medications:   •  acetaminophen (TYLENOL) tablet 650 mg, 650 mg, Oral, Q4H PRN **OR** acetaminophen (TYLENOL) 160 MG/5ML solution 650 mg, 650 mg, Oral, Q4H PRN **OR** acetaminophen  (TYLENOL) suppository 650 mg, 650 mg, Rectal, Q4H PRN, Kierra Schroeder, APRN  •  ALPRAZolam (XANAX) tablet 1 mg, 1 mg, Oral, BID PRN, Kierra Schroeder, APRN, 1 mg at 08/19/22 2100  •  atorvastatin (LIPITOR) tablet 40 mg, 40 mg, Oral, Nightly, Kierra Schroeder, APRN, 40 mg at 08/19/22 2059  •  butalbital-acetaminophen-caffeine (FIORICET, ESGIC) -40 MG per tablet 1 tablet, 1 tablet, Oral, Q4H PRN, Kierra Schroeder, APRN  •  carvedilol (COREG) tablet 12.5 mg, 12.5 mg, Oral, BID With Meals, Kierra Schroeder, APRN, 12.5 mg at 08/20/22 0827  •  cyclobenzaprine (FLEXERIL) tablet 5 mg, 5 mg, Oral, BID PRN, Kierra Schroeder, APRN  •  ipratropium (ATROVENT) nasal spray 0.03%, 1 spray, Each Nare, 4x Daily PRN, Kierra Schroeder, APRN  •  levothyroxine (SYNTHROID, LEVOTHROID) tablet 50 mcg, 50 mcg, Oral, Q AM, Kierra Schroeder, MORGAN, 50 mcg at 08/20/22 0540  •  ondansetron (ZOFRAN) tablet 4 mg, 4 mg, Oral, Q6H PRN **OR** ondansetron (ZOFRAN) injection 4 mg, 4 mg, Intravenous, Q6H PRN, Kierra Schroeder, APRN  •  oxyCODONE (ROXICODONE) immediate release tablet 10 mg, 10 mg, Oral, 4x Daily PRN, Kierra Schroeder, APRN  •  pantoprazole (PROTONIX) EC tablet 40 mg, 40 mg, Oral, Q AM, Kierra Schroeder, APRN, 40 mg at 08/20/22 0540  •  sertraline (ZOLOFT) tablet 25 mg, 25 mg, Oral, Daily, Kierra Schroeder, APRN, 25 mg at 08/20/22 0826  •  sodium chloride 0.9 % flush 10 mL, 10 mL, Intravenous, Q12H, Kierra Schroeder APRN, 10 mL at 08/20/22 0828  •  sodium chloride 0.9 % flush 10 mL, 10 mL, Intravenous, PRN, Kierra Schroeder APRN  •  SUMAtriptan (IMITREX) tablet 50 mg, 50 mg, Oral, BID PRN, Kierar Schroeder APRN  •  venlafaxine (EFFEXOR) tablet 25 mg, 25 mg, Oral, Daily, Kierra Schroeder APRN, 25 mg at 08/20/22 0827    Inpatient Medications: Scheduled Meds:atorvastatin, 40 mg, Oral, Nightly  carvedilol, 12.5 mg, Oral, BID With Meals  levothyroxine, 50 mcg, Oral, Q AM  pantoprazole, 40 mg, Oral, Q AM  sertraline,  25 mg, Oral, Daily  sodium chloride, 10 mL, Intravenous, Q12H  venlafaxine, 25 mg, Oral, Daily      Continuous Infusions:   PRN Meds:.•  acetaminophen **OR** acetaminophen **OR** acetaminophen  •  ALPRAZolam  •  butalbital-acetaminophen-caffeine  •  cyclobenzaprine  •  ipratropium  •  ondansetron **OR** ondansetron  •  oxyCODONE  •  sodium chloride  •  SUMAtriptan  Home Medications:   Prior to Admission medications    Medication Sig Start Date End Date Taking? Authorizing Provider   ALPRAZolam (XANAX) 1 MG tablet TAKE 1 TABLET BY MOUTH 3 (THREE) TIMES A DAY AS NEEDED FOR ANXIETY. 8/10/22  Yes Shasta Madrigal MD   atorvastatin (LIPITOR) 40 MG tablet Take 40 mg by mouth Every Night. 4/22/20  Yes Tamiko Gaviria MD   butalbital-acetaminophen-caffeine (FIORICET, ESGIC) -40 MG per tablet Take 1 tablet by mouth Every 4 (Four) Hours As Needed for Headache. 11/9/21  Yes Shasta Madrigal MD   carvedilol (COREG) 12.5 MG tablet Take 12.5 mg by mouth 2 (Two) Times a Day With Meals. 8/28/19  Yes ProviderTamiko MD   coenzyme Q10 100 MG capsule Take 100 mg by mouth Daily.   Yes Tamiko Gaviria MD   cyclobenzaprine (FLEXERIL) 10 MG tablet Take 10 mg by mouth 3 (Three) Times a Day As Needed for Muscle Spasms.   Yes Tamiko Gaviria MD   diclofenac (VOLTAREN) 75 MG EC tablet Take 75 mg by mouth 2 (Two) Times a Day. 12/30/21  Yes Tamiko Gaviria MD   donepezil (ARICEPT) 10 MG tablet Take 10 mg by mouth Every Night.   Yes Tamiko Gaviria MD   ergocalciferol (ERGOCALCIFEROL) 21469 units capsule Take 50,000 Units by mouth 1 (One) Time Per Week. 4/17/19  Yes Tamiko Gaviria MD   ipratropium (ATROVENT) 0.06 % nasal spray 2 sprays into the nostril(s) as directed by provider 4 (Four) Times a Day As Needed for Rhinitis. For drainage 1/19/22  Yes Patricio Angeles Jr., MD   lansoprazole (PREVACID) 30 MG capsule Take 30 mg by mouth Daily. 3/19/20  Yes Provider, Historical, MD   Myrbetriq 25 MG  tablet sustained-release 24 hour 24 hr tablet Take 1 tablet by mouth Daily. 5/10/22  Yes Shasta Madrigal MD   oxyCODONE (ROXICODONE) 15 MG immediate release tablet TAKE (1) TABLET BY MOUTH FOUR TIMES DAILY. FILL 427 22  Yes Tamiko Gaviria MD   pregabalin (LYRICA) 75 MG capsule Take 75 mg by mouth 2 (Two) Times a Day.   Yes Tamiko Gaviria MD   sertraline (ZOLOFT) 25 MG tablet Take 1 tablet by mouth Daily. 5/10/22 5/10/23 Yes Shasta Madrigal MD   SUMAtriptan (IMITREX) 50 MG tablet Take 1 tablet by mouth 2 (Two) Times a Day As Needed for Migraine. 19  Yes Shasta Madrigal MD   venlafaxine (EFFEXOR) 25 MG tablet Take 1 tablet by mouth Daily. 5/10/22  Yes Shasta Madrigal MD   levothyroxine (SYNTHROID, LEVOTHROID) 50 MCG tablet Take 50 mcg by mouth Every Morning.    ProviderTamiko MD        Vital Signs  Temp:  [97.5 °F (36.4 °C)-98.4 °F (36.9 °C)] 97.9 °F (36.6 °C)  Heart Rate:  [71-94] 79  Resp:  [16-18] 18  BP: (124-157)/(50-86) 142/52    Physical Exam  Physical Exam  Constitutional:       Appearance: She is well-developed. She is obese.   HENT:      Head: Normocephalic and atraumatic.   Eyes:      Extraocular Movements: EOM normal.      Pupils: Pupils are equal, round, and reactive to light.   Cardiovascular:      Comments: bula on right shin  Pulmonary:      Effort: Pulmonary effort is normal.      Breath sounds: Normal breath sounds.   Abdominal:      Palpations: Abdomen is soft.   Musculoskeletal:         General: Normal range of motion.      Cervical back: Normal range of motion and neck supple.      Right lower le+ Edema present.      Left lower le+ Edema present.   Skin:     General: Skin is warm and dry.   Neurological:      Mental Status: She is oriented to person, place, and time.      Coordination: Finger-Nose-Finger Test and Heel to Shin Test normal.      Gait: Tandem walk normal.      Deep Tendon Reflexes:      Reflex Scores:       Tricep reflexes are 2+ on the right  side and 2+ on the left side.       Bicep reflexes are 2+ on the right side and 2+ on the left side.       Brachioradialis reflexes are 2+ on the right side and 2+ on the left side.       Patellar reflexes are 1+ on the right side and 1+ on the left side.       Achilles reflexes are 0 on the right side and 0 on the left side.  Psychiatric:         Speech: Speech normal.         Neurologic Exam     Mental Status   Oriented to person, place, and time.   Registration: recalls 3 of 3 objects. Recall at 5 minutes: recalls 3 of 3 objects.   Attention: normal. Concentration: normal.   Speech: speech is normal   Level of consciousness: alert  Knowledge: consistent with education.     Cranial Nerves     CN II   Visual acuity: normal    CN III, IV, VI   Pupils are equal, round, and reactive to light.  Extraocular motions are normal.   Diplopia: none    CN V   Facial sensation intact.   Right corneal reflex: normal  Left corneal reflex: normal    CN VII   Right facial weakness: none  Left facial weakness: none    CN VIII   Hearing: intact    CN IX, X   Palate: symmetric  Right gag reflex: normal  Left gag reflex: normal    CN XI   Right trapezius strength: normal  Left trapezius strength: normal    CN XII   Tongue deviation: none    Motor Exam   Right arm tone: normal  Left arm tone: normal  Right arm pronator drift: absent  Left arm pronator drift: absent  Right leg tone: normal  Left leg tone: normal    Strength   Right deltoid: 5/5  Left deltoid: 5/5  Right biceps: 5/5  Left biceps: 5/5  Right triceps: 5/5  Left triceps: 5/5  Right interossei: 5/5  Left interossei: 5/5  Right iliopsoas: 4/5  Left iliopsoas: 4/5  Right quadriceps: 4/5  Left quadriceps: 4/5  Right anterior tibial: 3/5  Left anterior tibial: 3/5  Right gastroc: 3/5  Left gastroc: 3/5Right EHL 4/5   Left EHL 4/5     Sensory Exam   Right arm light touch: normal  Left arm light touch: normal  Right leg light touch: decreased from knee  Left leg light touch:  decreased from knee  Proprioception normal.   Sensory deficit distribution on right: L5  Sensory deficit distribution on left: L5  Complains of pain in lateral thigh     Gait, Coordination, and Reflexes     Gait  Gait: wide-based    Coordination   Finger to nose coordination: normal  Heel to shin coordination: normal  Tandem walking coordination: normal    Reflexes   Right brachioradialis: 2+  Left brachioradialis: 2+  Right biceps: 2+  Left biceps: 2+  Right triceps: 2+  Left triceps: 2+  Right patellar: 1+  Left patellar: 1+  Right achilles: 0  Left achilles: 0  Right plantar: equivocal  Left plantar: equivocal  Right Rubin: absent  Left Rubin: absent  Right ankle clonus: absent  Left ankle clonus: absentWith rollator       Results Review:   Independent review and interpretation of imaging  Imaging Results (Last 24 Hours)     Procedure Component Value Units Date/Time    MRI Lumbar Spine Without Contrast [[id number]] Collected: 08/19/22 1235     Updated: 08/19/22 1246    Narrative:      HISTORY: Low back pain, altered mental status. Reported fall     MRI lumbar spine: Multiplanar images lumbar spine performed without  contrast.     COMPARISON: MRI lumbar spine 4/11/2019     FINDINGS: Stable grade 1 spondylolisthesis at L4/L5 with anterolisthesis  of L4 over L5 measuring 4 to 5 mm, similar to before 11/20/2019 exam.  There is mild endplate spurring. There is a heterogeneous appearance to  the regional bony marrow which may represent fatty marrow replacement.  There is no suspicious focal lumbar vertebral mass. No compression  deformity. There is advanced facet osteoarthropathy at the lower 2  lumbar levels with moderate degenerative facet change above the L4/L5  level.     Conus medullaris terminates at the L1/L2 level.     At L1/L2, there is mild broad-based disc bulging with mild to moderate  facet arthropathy. Only borderline central canal stenosis with no  foraminal narrowing.     At L2/L3, there is mild  posterior disc bulging with a slight foraminal  predominance. There is moderate facet arthropathy with mild ligamentum  flavum hypertrophy. There is mild central canal stenosis with mild right  greater than left foraminal narrowing.     At L3/L4, there is mild disc bulging with a left lateral predominance.  There is moderate facet arthropathy. There is moderate central canal  stenosis with mild foraminal narrowing.     At L4/L5, there is advanced facet arthropathy with moderate posterior  disc bulging resulting in severe central canal stenosis with no visible  CSF fluid adjacent the compressed cauda equina. There is mild to  moderate bilateral neural foramen narrowing. Finding is similar to the  2019 exam.     At L5-S1, there is advanced facet osteoarthropathy with minimal  broad-based disc bulging. There is tapering of the spinal canal at this  level with only mild foraminal narrowing.       Impression:      1. Similar grade 1 spondylolisthesis at the L4/L5 level with advanced  facet arthropathy and moderate disc bulging resulting in severe L4/L5  central spinal canal stenosis and mild to moderate bilateral foraminal  narrowing. No acute lumbar vertebral pathology identified.      This report was finalized on 08/19/2022 12:43 by Dr. Blanka Pak MD.    XR Chest 1 View [[id number]] Collected: 08/19/22 1219     Updated: 08/19/22 1222    Narrative:      EXAMINATION: Chest 1 view 8/19/2022     HISTORY: Weakness     FINDINGS: Today's exam is compared to a previous study of 2/8/2022.  Lungs are hypoventilated with bibasilar subsegmental atelectasis. No  evidence of acute infiltrate or effusion. Mediastinal contours are  within normal limits.       Impression:      1.. Bibasilar atelectasis.  This report was finalized on 08/19/2022 12:19 by Dr. Chi Hinds MD.        MRI brain:  MRI spine:   2022 2019      CT Head:  CT c-spine:  CT t-spine:  CT l-spine:  X-ray:    I reviewed the patient's new clinical  results.  Lab Results (last 24 hours)     Procedure Component Value Units Date/Time    Lactate Dehydrogenase [[id number]]  (Abnormal) Collected: 08/19/22 1040    Specimen: Blood from Arm, Right Updated: 08/19/22 1743      U/L      Comment: Specimen hemolyzed.  Results may be affected.       Ferritin [[id number]]  (Normal) Collected: 08/19/22 1040    Specimen: Blood from Arm, Right Updated: 08/19/22 1733     Ferritin 39.08 ng/mL     Narrative:      Results may be falsely decreased if patient taking Biotin.      Iron Profile [[id number]]  (Abnormal) Collected: 08/19/22 1040    Specimen: Blood from Arm, Right Updated: 08/19/22 1733     Iron 66 mcg/dL      Iron Saturation 17 %      Transferrin 254 mg/dL      TIBC 378 mcg/dL     Reticulocytes [[id number]]  (Abnormal) Collected: 08/19/22 1040    Specimen: Blood from Arm, Right Updated: 08/19/22 1716     Reticulocyte % 3.45 %      Reticulocyte Absolute 0.1194 10*6/mm3     COVID PRE-OP / PRE-PROCEDURE SCREENING ORDER (NO ISOLATION) - Swab, Nasal Cavity [[id number]]  (Normal) Collected: 08/19/22 1030    Specimen: Swab from Nasal Cavity Updated: 08/19/22 1140    Narrative:      The following orders were created for panel order COVID PRE-OP / PRE-PROCEDURE SCREENING ORDER (NO ISOLATION) - Swab, Nasal Cavity.  Procedure                               Abnormality         Status                     ---------                               -----------         ------                     COVID-19,[facility]...[[id number]]  Normal              Final result                 Please view results for these tests on the individual orders.    COVID-19,[facility] IN-HOUSE,Nasal Swab No Transport Media 3-4 HR TAT - Swab, Nasal Cavity [[id number]]  (Normal) Collected: 08/19/22 1030    Specimen: Swab from Nasal Cavity Updated: 08/19/22 1140     COVID19 Not Detected    Narrative:      Fact sheet for providers: https://www.fda.gov/media/688807/download     Fact sheet for patients:  https://www.fda.gov/media/952160/download    Test performed by PCR.    Consider negative results in combination with clinical observations, patient history, and epidemiological information.    Urine Drug Screen - Urine, Catheter [[id number]]  (Abnormal) Collected: 08/19/22 1110    Specimen: Urine, Catheter Updated: 08/19/22 1133     THC, Screen, Urine Negative     Phencyclidine (PCP), Urine Negative     Cocaine Screen, Urine Negative     Methamphetamine, Ur Negative     Opiate Screen Negative     Amphetamine Screen, Urine Negative     Benzodiazepine Screen, Urine Positive     Tricyclic Antidepressants Screen Positive     Methadone Screen, Urine Negative     Barbiturates Screen, Urine Positive     Oxycodone Screen, Urine Positive     Propoxyphene Screen Negative     Buprenorphine, Screen, Urine Negative    Narrative:      Cutoff For Drugs Screened:    Amphetamines               500 ng/ml  Barbiturates               200 ng/ml  Benzodiazepines            150 ng/ml  Cocaine                    150 ng/ml  Methadone                  200 ng/ml  Opiates                    100 ng/ml  Phencyclidine               25 ng/ml  THC                            50 ng/ml  Methamphetamine            500 ng/ml  Tricyclic Antidepressants  300 ng/ml  Oxycodone                  100 ng/ml  Propoxyphene               300 ng/ml  Buprenorphine               10 ng/ml    The normal value for all drugs tested is negative. This report includes unconfirmed screening results, with the cutoff values listed, to be used for medical treatment purposes only.  Unconfirmed results must not be used for non-medical purposes such as employment or legal testing.  Clinical consideration should be applied to any drug of abuse test, particularly when unconfirmed results are used.      Urinalysis With Culture If Indicated - Urine, Catheter [[id number]]  (Abnormal) Collected: 08/19/22 1111    Specimen: Urine, Catheter Updated: 08/19/22 1127     Color, UA Dark Yellow     " Appearance, UA Clear     pH, UA 6.0     Specific Gravity, UA 1.019     Glucose, UA Negative     Ketones, UA Negative     Bilirubin, UA Negative     Blood, UA Negative     Protein, UA Negative     Leuk Esterase, UA Negative     Nitrite, UA Negative     Urobilinogen, UA 1.0 E.U./dL    Narrative:      In absence of clinical symptoms, the presence of pyuria, bacteria, and/or nitrites on the urinalysis result does not correlate with infection.  Urine microscopic not indicated.    TSH [[id number]]  (Normal) Collected: 08/19/22 1040    Specimen: Blood from Arm, Right Updated: 08/19/22 1117     TSH 3.280 uIU/mL     Procalcitonin [[id number]]  (Normal) Collected: 08/19/22 1040    Specimen: Blood from Arm, Right Updated: 08/19/22 1116     Procalcitonin 0.19 ng/mL     Narrative:      As a Marker for Sepsis (Non-Neonates):    1. <0.5 ng/mL represents a low risk of severe sepsis and/or septic shock.  2. >2 ng/mL represents a high risk of severe sepsis and/or septic shock.    As a Marker for Lower Respiratory Tract Infections that require antibiotic therapy:    PCT on Admission    Antibiotic Therapy       6-12 Hrs later    >0.5                Strongly Recommended  >0.25 - <0.5        Recommended   0.1 - 0.25          Discouraged              Remeasure/reassess PCT  <0.1                Strongly Discouraged     Remeasure/reassess PCT    As 28 day mortality risk marker: \"Change in Procalcitonin Result\" (>80% or <=80%) if Day 0 (or Day 1) and Day 4 values are available. Refer to http://[email or web address]-pct-[email or web address]    Change in PCT <=80%  A decrease of PCT levels below or equal to 80% defines a positive change in PCT test result representing a higher risk for 28-day all-cause mortality of patients diagnosed with severe sepsis for septic shock.    Change in PCT >80%  A decrease of PCT levels of more than 80% defines a negative change in PCT result representing a lower risk for 28-day all-cause mortality of patients diagnosed with " severe sepsis or septic shock.       T4, Free [[id number]]  (Normal) Collected: 08/19/22 1040    Specimen: Blood from Arm, Right Updated: 08/19/22 1116     Free T4 1.04 ng/dL     Narrative:      Results may be falsely increased if patient taking Biotin.      Comprehensive Metabolic Panel [[id number]]  (Abnormal) Collected: 08/19/22 1040    Specimen: Blood from Arm, Right Updated: 08/19/22 1111     Glucose 140 mg/dL      BUN 17 mg/dL      Creatinine 1.00 mg/dL      Sodium 141 mmol/L      Potassium 4.0 mmol/L      Comment: Slight hemolysis detected by analyzer. Results may be affected.        Chloride 105 mmol/L      CO2 31.0 mmol/L      Calcium 9.2 mg/dL      Total Protein 5.9 g/dL      Albumin 3.30 g/dL      ALT (SGPT) 32 U/L      AST (SGOT) 35 U/L      Alkaline Phosphatase 96 U/L      Total Bilirubin 0.4 mg/dL      Globulin 2.6 gm/dL      A/G Ratio 1.3 g/dL      BUN/Creatinine Ratio 17.0     Anion Gap 5.0 mmol/L      eGFR 57.4 mL/min/1.73      Comment: National Kidney Foundation and American Society of Nephrology (ASN) Task Force recommended calculation based on the Chronic Kidney Disease Epidemiology Collaboration (CKD-EPI) equation refit without adjustment for race.       Narrative:      GFR Normal >60  Chronic Kidney Disease <60  Kidney Failure <15      Lactic Acid, Plasma [[id number]]  (Normal) Collected: 08/19/22 1040    Specimen: Blood from Arm, Right Updated: 08/19/22 1109     Lactate 1.5 mmol/L     Ethanol [[id number]] Collected: 08/19/22 1040    Specimen: Blood from Arm, Right Updated: 08/19/22 1106     Ethanol % <0.010 %     Narrative:      Not for legal purposes. Chain of Custody not followed.     Magnesium [[id number]]  (Normal) Collected: 08/19/22 1040    Specimen: Blood from Arm, Right Updated: 08/19/22 1105     Magnesium 1.7 mg/dL     CBC & Differential [[id number]]  (Abnormal) Collected: 08/19/22 1040    Specimen: Blood from Arm, Right Updated: 08/19/22 1050    Narrative:      The following orders were  created for panel order CBC & Differential.  Procedure                               Abnormality         Status                     ---------                               -----------         ------                     CBC Auto Differential[[id number]]        Abnormal            Final result                 Please view results for these tests on the individual orders.    CBC Auto Differential [[id number]]  (Abnormal) Collected: 08/19/22 1040    Specimen: Blood from Arm, Right Updated: 08/19/22 1050     WBC 6.41 10*3/mm3      RBC 3.51 10*6/mm3      Hemoglobin 10.5 g/dL      Hematocrit 33.4 %      MCV 95.2 fL      MCH 29.9 pg      MCHC 31.4 g/dL      RDW 14.0 %      RDW-SD 47.3 fl      MPV 11.2 fL      Platelets 161 10*3/mm3      Neutrophil % 70.6 %      Lymphocyte % 16.1 %      Monocyte % 9.2 %      Eosinophil % 3.4 %      Basophil % 0.2 %      Immature Grans % 0.5 %      Neutrophils, Absolute 4.53 10*3/mm3      Lymphocytes, Absolute 1.03 10*3/mm3      Monocytes, Absolute 0.59 10*3/mm3      Eosinophils, Absolute 0.22 10*3/mm3      Basophils, Absolute 0.01 10*3/mm3      Immature Grans, Absolute 0.03 10*3/mm3      nRBC 0.0 /100 WBC           Jarek Miller MD

## 2022-08-20 NOTE — PROGRESS NOTES
"    [facility] Medicine Services  INPATIENT PROGRESS NOTE    Patient Name: Jennifer Gomes  Date of Admission: 8/19/2022  Today's Date: 08/20/22  Length of Stay: 1  Primary Care Physician: Olivia Mora APRN    Subjective   Chief Complaint: Weakness.  HPI   Seems a little bit better today after limiting some of her medications yesterday afternoon.  She states that her pain is okay, but is having problems with numbness of her back down to her feet.    She tells me that she is \"dying one piece a at a time until she dies.\"    Her family was here earlier, but she sent them away when she got her lunch tray.  She states that her lunch actually smells bad and made her nauseated.  She does not want to eat it.  I talked about maybe getting her something different and she told me she is not worried about it.    Dr. Miller evaluated her and wants to see her back in the office to have further discussions.  No surgical management needed at this point in time.      Her family does not want placement.  They request for her to come home with home health and family assistance when she is ready. She might be able to discharge tomorrow.    Review of Systems   All pertinent negatives and positives are as above. All other systems have been reviewed and are negative unless otherwise stated.     Objective    Temp:  [97.5 °F (36.4 °C)-98.4 °F (36.9 °C)] 98.4 °F (36.9 °C)  Heart Rate:  [69-82] 69  Resp:  [16-18] 18  BP: (102-146)/(50-86) 102/64  Physical Exam  Constitutional:       Comments: Up in bed.  No distress.  No family present.  She tells me that I just missed her  and daughter.  She sent them away when her lunch tray came. Discussed with her nurse, Abena.    HENT:      Head: Normocephalic and atraumatic.   Eyes:      Conjunctiva/sclera: Conjunctivae normal.      Pupils: Pupils are equal, round, and reactive to light.   Neck:      Vascular: No JVD.   Cardiovascular:      Rate and " Rhythm: Normal rate and regular rhythm.      Heart sounds: Normal heart sounds.   Pulmonary:      Effort: Pulmonary effort is normal. No respiratory distress.      Breath sounds: Normal breath sounds. No wheezing or rales.   Chest:      Chest wall: No tenderness.   Abdominal:      General: Bowel sounds are normal. There is no distension.      Palpations: Abdomen is soft.      Tenderness: There is no abdominal tenderness.   Musculoskeletal:         General: Swelling present. No tenderness or deformity. Normal range of motion.      Cervical back: Neck supple.   Skin:     General: Skin is warm and dry.      Findings: No rash.   Neurological:      General: No focal deficit present.      Mental Status: She is alert and oriented to person, place, and time.      Cranial Nerves: No cranial nerve deficit.      Motor: Weakness present. No abnormal muscle tone.      Deep Tendon Reflexes: Reflexes normal.   Psychiatric:      Comments: Flat affect.  Is some difficulty with confabulating and flight of ideas at times.  She is oriented, but is tangential.        Results Review:  I have reviewed the labs, radiology results, and diagnostic studies.    Laboratory Data:   Results from last 7 days   Lab Units 08/20/22  1100 08/19/22  1040   WBC 10*3/mm3 5.19 6.41   HEMOGLOBIN g/dL 10.0* 10.5*   HEMATOCRIT % 31.0* 33.4*   PLATELETS 10*3/mm3 158 161      Results from last 7 days   Lab Units 08/20/22  1100 08/19/22  1040   SODIUM mmol/L 141 141   POTASSIUM mmol/L 3.9 4.0   CHLORIDE mmol/L 104 105   CO2 mmol/L 32.0* 31.0*   BUN mg/dL 15 17   CREATININE mg/dL 0.81 1.00   CALCIUM mg/dL 9.1 9.2   BILIRUBIN mg/dL  --  0.4   ALK PHOS U/L  --  96   ALT (SGPT) U/L  --  32   AST (SGOT) U/L  --  35*   GLUCOSE mg/dL 115* 140*     I have reviewed the patient's current medications.     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  Her family brought her in secondary to problems with weakness, confusion, and frequent falls.  She has problems with polypharmacy due to fibromyalgia and chronic pain syndrome.  She is chronically on Lyrica, Flexeril, oxycodone, Xanax, Effexor, Zoloft, Fioricet, Imitrex, donepezil.  She also has a history of problems with lumbar spinal stenosis.  Dr. Rothman wanted her admitted for observation and neurosurgical evaluation.    Dr. Miller plans no immediate neurosurgical intervention.  Recommends pain control and therapy services.  We will follow her back in the office postdischarge when she is more at her mental status baseline and can also have family more reliably present with her.    We made reductions in several of her medications yesterday.  She is more alert and talkative today, but still has problems with confabulating and flight of ideas.  Unclear of her exact mental status baseline.    PT/OT.    Reconciled and resumed other home medications as appropriate.    SCDs for DVT prophylaxis.    Discharge Planning: I expect the patient to be discharged to home with home health and family assistance in 1-2 days.    Electronically signed by Angel Hay DO, 08/20/22, 13:01 CDT.

## 2022-08-20 NOTE — PLAN OF CARE
Goal Outcome Evaluation:  Plan of Care Reviewed With: patient        Progress: no change  Outcome Evaluation: Initial RDN eval completed. Assessment completed remotely with use of real time audio and EMR on Pt unit.  Attempted calls to Pt room, on first call Pt stated it was not a good time to talk; on further attempts telephone was not answered.  RD will continue to follow while inpatient, however, If Pt agreeable and desires wt loss/nutrition education recommend OP referrals be made to [facility] OR [facility] as comprehensive nutrition education and wt loss goals are optimal in the OP setting rather than in acute care.

## 2022-08-21 ENCOUNTER — READMISSION MANAGEMENT (OUTPATIENT)
Dept: CALL CENTER | Facility: HOSPITAL | Age: 82
End: 2022-08-21

## 2022-08-21 VITALS
OXYGEN SATURATION: 94 % | HEIGHT: 62 IN | WEIGHT: 215.61 LBS | SYSTOLIC BLOOD PRESSURE: 154 MMHG | HEART RATE: 75 BPM | BODY MASS INDEX: 39.68 KG/M2 | TEMPERATURE: 98.1 F | RESPIRATION RATE: 16 BRPM | DIASTOLIC BLOOD PRESSURE: 63 MMHG

## 2022-08-21 PROCEDURE — 97165 OT EVAL LOW COMPLEX 30 MIN: CPT | Performed by: OCCUPATIONAL THERAPIST

## 2022-08-21 PROCEDURE — 97161 PT EVAL LOW COMPLEX 20 MIN: CPT | Performed by: PHYSICAL THERAPIST

## 2022-08-21 RX ORDER — ALPRAZOLAM 1 MG/1
1 TABLET ORAL 2 TIMES DAILY PRN
Start: 2022-08-21 | End: 2022-11-23 | Stop reason: SDUPTHER

## 2022-08-21 RX ORDER — CYCLOBENZAPRINE HCL 5 MG
5 TABLET ORAL 2 TIMES DAILY PRN
Start: 2022-08-21 | End: 2023-01-16

## 2022-08-21 RX ORDER — OXYCODONE HYDROCHLORIDE 10 MG/1
10 TABLET ORAL 4 TIMES DAILY PRN
Refills: 0
Start: 2022-08-21 | End: 2022-08-26

## 2022-08-21 RX ADMIN — OXYCODONE 10 MG: 5 TABLET ORAL at 11:06

## 2022-08-21 RX ADMIN — OXYCODONE 10 MG: 5 TABLET ORAL at 15:11

## 2022-08-21 RX ADMIN — BUTALBITAL, ACETAMINOPHEN AND CAFFEINE 1 TABLET: 50; 325; 40 TABLET ORAL at 08:09

## 2022-08-21 RX ADMIN — LEVOTHYROXINE SODIUM 50 MCG: 50 TABLET ORAL at 03:58

## 2022-08-21 RX ADMIN — CYCLOBENZAPRINE 5 MG: 10 TABLET, FILM COATED ORAL at 08:09

## 2022-08-21 RX ADMIN — SERTRALINE HYDROCHLORIDE 25 MG: 50 TABLET, FILM COATED ORAL at 08:09

## 2022-08-21 RX ADMIN — PANTOPRAZOLE SODIUM 40 MG: 40 TABLET, DELAYED RELEASE ORAL at 03:58

## 2022-08-21 RX ADMIN — VENLAFAXINE 25 MG: 50 TABLET ORAL at 08:09

## 2022-08-21 RX ADMIN — CARVEDILOL 12.5 MG: 6.25 TABLET, FILM COATED ORAL at 08:09

## 2022-08-21 RX ADMIN — OXYCODONE 10 MG: 5 TABLET ORAL at 03:59

## 2022-08-21 NOTE — PLAN OF CARE
Goal Outcome Evaluation:  Plan of Care Reviewed With: patient        Progress: no change  Outcome Evaluation: PT eval completed. Pt alert and oriented x4 and in BR upon arrival. Pt performs sit to stand with SBA and ambulates in room with CGA and RW. Pt reqd cues for positioning of AD and maintained B hip and knee flexion during gait training, as well as forward flexed posture. Pt edu on importance of keeping AD in close proximity to body to decrease fall risk. Pt verbalizes understanding. Pt to d/c home same day as eval, therefore PT to sign off. Recommend d/c home with assist and HH.

## 2022-08-21 NOTE — PLAN OF CARE
Goal Outcome Evaluation:  Plan of Care Reviewed With: patient   A&Ox2-3. Pain meds given for back relief.  Speech is clear.  Ambulating to bathroom with walker and Ax1.  Tele in place NSR.  2 L NC.  Bed alarm on.  Safety maintained. Will continue to monitor.       Progress: improving

## 2022-08-21 NOTE — DISCHARGE SUMMARY
[facility]  DISCHARGE SUMMARY       Date of Admission: 8/19/2022  Date of Discharge:  8/21/2022  Primary Care Physician: Olivia Mora APRN    Presenting Problem/History of Present Illness:  Weakness with frequent falls.    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 Miller with neurosurgery.     Procedures Performed: None.     Pertinent Test Results:   Imaging Results (All)     Procedure Component Value Units Date/Time    MRI Lumbar Spine Without Contrast [[id number]] Collected: 08/19/22 1235     Updated: 08/19/22 1246    Narrative:      HISTORY: Low back pain, altered mental status. Reported fall     MRI lumbar spine: Multiplanar images lumbar spine performed without  contrast.     COMPARISON: MRI lumbar spine 4/11/2019     FINDINGS: Stable grade 1 spondylolisthesis at L4/L5 with anterolisthesis  of L4 over L5 measuring 4 to 5 mm, similar to before 11/20/2019 exam.  There is mild endplate spurring. There is a heterogeneous appearance to  the regional bony marrow which may represent fatty marrow replacement.  There is no suspicious focal lumbar vertebral mass. No compression  deformity. There is advanced facet osteoarthropathy at the lower 2  lumbar levels with moderate degenerative facet change above the L4/L5  level.     Conus medullaris terminates at the L1/L2 level.     At L1/L2, there is mild broad-based disc bulging with mild to moderate  facet arthropathy. Only borderline central canal stenosis with no  foraminal narrowing.     At L2/L3, there is mild posterior disc  bulging with a slight foraminal  predominance. There is moderate facet arthropathy with mild ligamentum  flavum hypertrophy. There is mild central canal stenosis with mild right  greater than left foraminal narrowing.     At L3/L4, there is mild disc bulging with a left lateral predominance.  There is moderate facet arthropathy. There is moderate central canal  stenosis with mild foraminal narrowing.     At L4/L5, there is advanced facet arthropathy with moderate posterior  disc bulging resulting in severe central canal stenosis with no visible  CSF fluid adjacent the compressed cauda equina. There is mild to  moderate bilateral neural foramen narrowing. Finding is similar to the  2019 exam.     At L5-S1, there is advanced facet osteoarthropathy with minimal  broad-based disc bulging. There is tapering of the spinal canal at this  level with only mild foraminal narrowing.       Impression:      1. Similar grade 1 spondylolisthesis at the L4/L5 level with advanced  facet arthropathy and moderate disc bulging resulting in severe L4/L5  central spinal canal stenosis and mild to moderate bilateral foraminal  narrowing. No acute lumbar vertebral pathology identified.      This report was finalized on 08/19/2022 12:43 by Dr. Blanka Pak MD.    XR Chest 1 View [[id number]] Collected: 08/19/22 1219     Updated: 08/19/22 1222    Narrative:      EXAMINATION: Chest 1 view 8/19/2022     HISTORY: Weakness     FINDINGS: Today's exam is compared to a previous study of 2/8/2022.  Lungs are hypoventilated with bibasilar subsegmental atelectasis. No  evidence of acute infiltrate or effusion. Mediastinal contours are  within normal limits.       Impression:      1.. Bibasilar atelectasis.  This report was finalized on 08/19/2022 12:19 by Dr. Chi Hinds MD.        LAB RESULTS:      Lab 08/20/22  1100 08/19/22  1040   WBC 5.19 6.41   HEMOGLOBIN 10.0* 10.5*   HEMATOCRIT 31.0* 33.4*   PLATELETS 158 161   NEUTROS ABS  --  4.53    IMMATURE GRANS (ABS)  --  0.03   LYMPHS ABS  --  1.03   MONOS ABS  --  0.59   EOS ABS  --  0.22   MCV 94.5 95.2   PROCALCITONIN  --  0.19   LACTATE  --  1.5   LDH  --  349*         Lab 08/20/22  1100 08/19/22  1040   SODIUM 141 141   POTASSIUM 3.9 4.0   CHLORIDE 104 105   CO2 32.0* 31.0*   ANION GAP 5.0 5.0   BUN 15 17   CREATININE 0.81 1.00   EGFR 73.9 57.4*   GLUCOSE 115* 140*   CALCIUM 9.1 9.2   MAGNESIUM  --  1.7   HEMOGLOBIN A1C 5.00  --    TSH  --  3.280         Lab 08/19/22  1040   TOTAL PROTEIN 5.9*   ALBUMIN 3.30*   GLOBULIN 2.6   ALT (SGPT) 32   AST (SGOT) 35*   BILIRUBIN 0.4   ALK PHOS 96             Lab 08/20/22  1100   CHOLESTEROL 230*   LDL CHOL 152*   HDL CHOL 41   TRIGLYCERIDES 205*         Lab 08/19/22  1040   IRON 66   IRON SATURATION 17*   TIBC 378   TRANSFERRIN 254   FERRITIN 39.08         Brief Urine Lab Results  (Last result in the past 365 days)      Color   Clarity   Blood   Leuk Est   Nitrite   Protein   CREAT   Urine HCG        08/19/22 1111 Dark Yellow   Clear   Negative   Negative   Negative   Negative               Microbiology Results (last 10 days)     Procedure Component Value - Date/Time    COVID PRE-OP / PRE-PROCEDURE SCREENING ORDER (NO ISOLATION) - Swab, Nasal Cavity [[id number]]  (Normal) Collected: 08/19/22 1030    Lab Status: Final result Specimen: Swab from Nasal Cavity Updated: 08/19/22 1140    Narrative:      The following orders were created for panel order COVID PRE-OP / PRE-PROCEDURE SCREENING ORDER (NO ISOLATION) - Swab, Nasal Cavity.  Procedure                               Abnormality         Status                     ---------                               -----------         ------                     COVID-19,[facility]...[[id number]]  Normal              Final result                 Please view results for these tests on the individual orders.    COVID-19,[facility] IN-HOUSE,Nasal Swab No Transport Media 3-4 HR TAT - Swab, Nasal Cavity [[id number]]  (Normal)  Collected: 08/19/22 1030    Lab Status: Final result Specimen: Swab from Nasal Cavity Updated: 08/19/22 1140     COVID19 Not Detected    Narrative:      Fact sheet for providers: https://www.fda.gov/media/749248/download     Fact sheet for patients: https://www.fda.gov/media/058885/download    Test performed by PCR.    Consider negative results in combination with clinical observations, patient history, and epidemiological information.        Hospital Course:   The patient was admitted on 8/19 by myself and MORGAN Cunningham.  Her family brought her in secondary to problems with weakness, confusion, and frequent falls.  She has problems with polypharmacy due to fibromyalgia and chronic pain syndrome.  She is chronically on Lyrica, Flexeril, oxycodone, Xanax, Effexor, Zoloft, Fioricet, Imitrex, donepezil. She also has a history of problems with lumbar spinal stenosis.  Dr. Rothman wanted her admitted for observation and neurosurgical evaluation.     Dr. Miller plans no immediate neurosurgical intervention.  Recommends pain control and therapy services.  We will follow her back in the office postdischarge when she is more at her mental status baseline and can also have family more reliably present with her.     We made reductions in several of her medications.  Donepezil to remain on hold for now.  Xanax, cyclobenzaprine, and oxycodone reduced.  We had completely stopped her Lyrica, but she states today that she absolutely cannot be without this medication.  She is having worsening of her neuropathic and fibromyalgia symptoms.       Her mental status is fairly crisp today.  She is alert and oriented x4.  She carries on appropriate conversation that was thoughtful and insightful.  She states that she does not wish to stay in the hospital any longer.  The bed is causing her problems.  She wants to be back home with her family.     PT/OT were consulted.  She apparently symptom way several times yesterday.  She can  "ambulate to the bathroom without difficulty with her walker according to the nursing staff.  Dr. Miller placed referrals for outpatient PT/OT.  She does not wish to wait in the hospital for inpatient evaluations.     Reconciled and resumed other home medications as appropriate.     SCDs were used for DVT prophylaxis.    Follow with primary care in 1 week.  Follow with Dr. Miller in 1 month.  Outpatient PT/OT.  She is not homebound.    Physical Exam on Discharge:  /63 (BP Location: Right arm, Patient Position: Lying)   Pulse 75   Temp 98.1 °F (36.7 °C) (Oral)   Resp 16   Ht 157.5 cm (62\")   Wt 97.8 kg (215 lb 9.8 oz)   LMP  (LMP Unknown)   SpO2 94%   BMI 39.44 kg/m²   Physical Exam  Constitutional:       Comments: Up in the chair.  No distress.  No family present.  Discussed with her nurse, Triston.    HENT:      Head: Normocephalic and atraumatic.   Eyes:      Conjunctiva/sclera: Conjunctivae normal.      Pupils: Pupils are equal, round, and reactive to light.   Neck:      Vascular: No JVD.   Cardiovascular:      Rate and Rhythm: Normal rate and regular rhythm.      Heart sounds: Normal heart sounds.   Pulmonary:      Effort: Pulmonary effort is normal. No respiratory distress.      Breath sounds: Normal breath sounds. No wheezing or rales.   Chest:      Chest wall: No tenderness.   Abdominal:      General: Bowel sounds are normal. There is no distension.      Palpations: Abdomen is soft.      Tenderness: There is no abdominal tenderness.   Musculoskeletal:         General: Swelling present. No tenderness or deformity. Normal range of motion.      Cervical back: Neck supple.   Skin:     General: Skin is warm and dry.      Findings: No rash.   Neurological:      General: No focal deficit present.      Mental Status: She is alert and oriented to person, place, and time.      Cranial Nerves: No cranial nerve deficit.      Motor: Weakness present. No abnormal muscle tone.      Deep Tendon Reflexes: " Reflexes normal.   Psychiatric:      Comments: Brighter affect today. Stayed on task with conversation today.       Condition on Discharge: Stable for home care with family assistance. Dr. Miller placed referrals for outpatient PT/OT.     Discharge Disposition:  Home or Self Care    Discharge Medications:     Discharge Medications      Changes to Medications      Instructions Start Date   ALPRAZolam 1 MG tablet  Commonly known as: XANAX  What changed:   · when to take this  · reasons to take this   1 mg, Oral, 2 Times Daily PRN      cyclobenzaprine 5 MG tablet  Commonly known as: FLEXERIL  What changed:   · medication strength  · how much to take  · when to take this   5 mg, Oral, 2 Times Daily PRN      oxyCODONE 10 MG tablet  Commonly known as: ROXICODONE  What changed:   · medication strength  · how much to take  · when to take this  · reasons to take this   10 mg, Oral, 4 Times Daily PRN         Continue These Medications      Instructions Start Date   atorvastatin 40 MG tablet  Commonly known as: LIPITOR   40 mg, Oral, Nightly      bumetanide 1 MG tablet  Commonly known as: BUMEX   1 mg, Oral, Daily      butalbital-acetaminophen-caffeine -40 MG per tablet  Commonly known as: FIORICET, ESGIC   1 tablet, Oral, 2 Times Daily PRN      carvedilol 12.5 MG tablet  Commonly known as: COREG   12.5 mg, Oral, 2 Times Daily With Meals      coenzyme Q10 100 MG capsule   100 mg, Oral, Daily      diclofenac 75 MG EC tablet  Commonly known as: VOLTAREN   75 mg, Oral, 2 Times Daily      diphenhydrAMINE 25 mg capsule  Commonly known as: BENADRYL   25 mg, Oral, Every 6 Hours PRN      ergocalciferol 1.25 MG (46488 UT) capsule  Commonly known as: ERGOCALCIFEROL   50,000 Units, Oral, Weekly, Saturday      ipratropium 0.06 % nasal spray  Commonly known as: ATROVENT   2 sprays, Nasal, 4 Times Daily PRN, For drainage      LACTASE ENZYME PO   3 tablets, Oral, As Needed      lansoprazole 30 MG capsule  Commonly known as:  PREVACID   30 mg, Oral, Every Morning      levothyroxine 50 MCG tablet  Commonly known as: SYNTHROID, LEVOTHROID   50 mcg, Oral, Every Morning      loperamide 2 MG capsule  Commonly known as: IMODIUM   2 mg, Oral, 4 Times Daily PRN      meclizine 12.5 MG tablet  Commonly known as: ANTIVERT   25-50 mg, Oral, Daily PRN      Mirabegron ER 25 MG tablet sustained-release 24 hour 24 hr tablet  Commonly known as: MYRBETRIQ   50 mg, Oral, Daily      pregabalin 75 MG capsule  Commonly known as: LYRICA   75 mg, Oral, 2 Times Daily      sertraline 25 MG tablet  Commonly known as: ZOLOFT   25 mg, Oral, Daily      simethicone 125 MG chewable tablet  Commonly known as: MYLICON   125 mg, Oral, Every 6 Hours PRN      SUMAtriptan 50 MG tablet  Commonly known as: IMITREX   50 mg, Oral, 2 Times Daily PRN      venlafaxine 25 MG tablet  Commonly known as: EFFEXOR   25 mg, Oral, Daily         Stop These Medications    donepezil 10 MG tablet  Commonly known as: ARICEPT          Discharge Diet:   Diet Instructions     Diet: Cardiac; Thin      Discharge Diet: Cardiac    Fluid Consistency: Thin        Activity at Discharge:   Activity Instructions     Activity as Tolerated          Follow-up Appointments:   1. PCP in 1 week.   2. Dr. Miller in 1 month.     Future Appointments   Date Time Provider Department Center   8/23/2022  1:15 PM Shasta Madrigal MD [facility]   9/7/2022  2:15 PM P[facility] MRI 1 [facility] PAD MR [facility]     Test Results Pending at Discharge: None.     Electronically signed by Angel Hay DO, 08/21/22, 13:50 CDT.    Time: 25 minutes.

## 2022-08-21 NOTE — THERAPY DISCHARGE NOTE
Acute Care - Occupational Therapy Discharge  [facility]    Patient Name: Jennifer Gomes  : 1940    MRN: [id number]                              Today's Date: 2022       Admit Date: 2022    Visit Dx:     ICD-10-CM ICD-9-CM   1. Altered mental status, unspecified altered mental status type  R41.82 780.97   2. Polypharmacy  Z79.899 V58.69   3. Generalized weakness  R53.1 780.79   4. Spinal stenosis of lumbar region, unspecified whether neurogenic claudication present  M48.061 724.02   5. Dysphagia, unspecified type  R13.10 787.20   6. Frequent falls  R29.6 V15.88   7. Chronic pain syndrome  G89.4 338.4   8. PIERCE (generalized anxiety disorder)  F41.1 300.02   9. Decreased activities of daily living (ADL)  Z78.9 V49.89     Patient Active Problem List   Diagnosis   • Gastroesophageal reflux disease   • Spinal stenosis, lumbar region, without neurogenic claudication   • Overweight   • Non-smoker   • Erosion of vaginal mesh ([facility])   • Adenocarcinoma of endometrium ([facility])   • Encounter for consultation   • S/P hysterectomy with oophorectomy   • Encounter for follow-up surveillance of endometrial cancer   • History of radiation therapy   • Obesity (BMI 30-39.9)   • Non-traumatic rhabdomyolysis   • Metabolic encephalopathy   • Acute renal failure superimposed on stage 3 chronic kidney disease ([facility])   • Acute respiratory failure with hypoxia and hypercapnia ([facility])   • Medical non-compliance, does not take narcotics as prescribed   • SIRS (systemic inflammatory response syndrome) ([facility])   • Chronic constipation   • Chronic pain syndrome   • Chronic prescription opiate use   • Normocytic anemia   • Hypothyroidism (acquired)   • Essential hypertension   • TOMÁS (acute kidney injury) ([facility])   • Venous insufficiency of both lower extremities   • Fluid retention   • Low blood pressure reading   • Obesity, Class III, BMI 40-49.9 (morbid obesity) ([facility])   • Chronic intractable headache   • RAFAL (obstructive sleep apnea)   •  Change in bowel habits   • Altered mental status   • Toxic metabolic encephalopathy   • Polypharmacy   • Frequent falls     Past Medical History:   Diagnosis Date   • Anxiety    • Arthritis    • Cancer (HCC)     uterine   • Chronic pain    • Depression    • Disease of thyroid gland    • Fibromyalgia    • Headache    • Hyperlipidemia    • Hypertension    • Incontinence    • Insomnia    • Leg pain    • Lumbar stenosis    • Peptic ulcer    • Restless legs    • Vaginal bleeding      Past Surgical History:   Procedure Laterality Date   • BLADDER REPAIR  2011    MESH HAD TO BE REMOVED IN 2013   • BREAST BIOPSY Right 2017    benign   • BREAST CYST EXCISION Left 1982   • CARDIAC CATHETERIZATION     • CARPAL TUNNEL RELEASE     • CATARACT EXTRACTION W/ INTRAOCULAR LENS  IMPLANT, BILATERAL     • COLONOSCOPY     • COLONOSCOPY N/A 10/1/2021    Procedure: COLONOSCOPY WITH ANESTHESIA;  Surgeon: Tom Velasco DO;  Location: [facility] ENDOSCOPY;  Service: Gastroenterology;  Laterality: N/A;  pre: change in bowel habits  post: diverticulosis. hemorrhoids.   Olivia Mora APRN       • CYSTECTOMY     • D & C HYSTEROSCOPY N/A 11/6/2017    Procedure: DILATATION AND CURETTAGE HYSTEROSCOPY;  Surgeon: Shasta Madrigal MD;  Location: [facility] OR;  Service:    • DILATION AND CURETTAGE, DIAGNOSTIC / THERAPEUTIC  2008   • ENDOSCOPY  09/23/2010    Short segment of Arriola's,Moderate chroninc esophagogastritis and negative H.pylori   • ENDOSCOPY N/A 9/25/2017    Procedure: ESOPHAGOGASTRODUODENOSCOPY WITH ANESTHESIA;  Surgeon: Tom Velasco DO;  Location: [facility] ENDOSCOPY;  Service:    • HYSTERECTOMY  12/20/2017   • ORIF TIBIA/FIBULA FRACTURES Left 2000   • TRANSVAGINAL TAPING SUSPENSION N/A 11/6/2017    Procedure: VAGINAL MESH REVISION;  Surgeon: Shasta Madrigal MD;  Location: [facility] OR;  Service:    • VAGINAL MESH REVISION  2013      General Information     Row Name 08/21/22 1405          OT Time and Intention    Document Type evaluation   presents with AMS;PMH Anxiety, Arthritis, Chronic pain, Depression, Disease of thyroid gland, Fibromyalgia, Headache, HLD, HTN, Incontinence, Insomnia, Leg pain, Lumbar stenosis, Peptic ulcer, Restless legs  -     Mode of Treatment occupational therapy  -     Row Name 08/21/22 1405          General Information    Patient Profile Reviewed yes  -     Prior Level of Function independent:;all household mobility;transfer;ADL's;bed mobility  -     Existing Precautions/Restrictions fall  -     Barriers to Rehab medically complex  -     Row Name 08/21/22 1405          Living Environment    People in Home spouse;child(sebastian), adult  -     Row Name 08/21/22 1405          Cognition    Orientation Status (Cognition) oriented x 4  -     Row Name 08/21/22 1405          Safety Issues, Functional Mobility    Safety Issues Affecting Function (Mobility) awareness of need for assistance;positioning of assistive device  -     Impairments Affecting Function (Mobility) balance;endurance/activity tolerance;strength  -           User Key  (r) = Recorded By, (t) = Taken By, (c) = Cosigned By    Initials Name Provider Type    Blanka Delcid, OTR/L, CSRS Occupational Therapist               Mobility/ADL's     Row Name 08/21/22 1405          Bed Mobility    Comment, (Bed Mobility) up in BR upon arrival  -     Row Name 08/21/22 1405          Transfers    Transfers sit-stand transfer;stand-sit transfer;toilet transfer  -     Sit-Stand [place] (Transfers) supervision  -     Stand-Sit [place] (Transfers) supervision  -     [place] Level (Toilet Transfer) supervision  -     Assistive Device (Toilet Transfer) commode;grab bars/safety frame  -     Row Name 08/21/22 1405          Sit-Stand Transfer    Assistive Device (Sit-Stand Transfers) walker, front-wheeled  -     Row Name 08/21/22 1405          Stand-Sit Transfer    Assistive Device (Stand-Sit Transfers) walker, front-wheeled  -[facility]  Name 08/21/22 1405          Toilet Transfer    Type (Toilet Transfer) sit-stand;stand-sit  -     Row Name 08/21/22 1405          Functional Mobility    Functional Mobility- Ind. Level contact guard assist  -     Functional Mobility- Device walker, front-wheeled  -     Functional Mobility- Safety Issues step length decreased  -     Functional Mobility- Comment forward flexed. Required vcs for positioning of rwx.  -     Row Name 08/21/22 1405          Activities of Daily Living    BADL Assessment/Intervention lower body dressing;upper body dressing  -     Row Name 08/21/22 1405          Lower Body Dressing Assessment/Training    [place] Level (Lower Body Dressing) don;socks;pants/bottoms;minimum assist (75% patient effort)  -     Position (Lower Body Dressing) unsupported sitting;supported standing  -     Row Name 08/21/22 1405          Upper Body Dressing Assessment/Training    [place] Level (Upper Body Dressing) don;bra/undergarment;pull-over garment;minimum assist (75% patient effort)  -     Position (Upper Body Dressing) unsupported sitting  -           User Key  (r) = Recorded By, (t) = Taken By, (c) = Cosigned By    Initials Name Provider Type    JJ Blanka Ro, IANR/L, CSRS Occupational Therapist               Obj/Interventions     Row Name 08/21/22 1409          Sensory Assessment (Somatosensory)    Sensory Assessment (Somatosensory) sensation intact  -     Row Name 08/21/22 1409          Vision Assessment/Intervention    Visual Impairment/Limitations L  [facility]     Row Name 08/21/22 1409          Range of Motion Comprehensive    General Range of Motion bilateral upper extremity ROM [facility]     Row Name 08/21/22 1409          Strength Comprehensive (MMT)    Comment, General Manual Muscle Testing (MMT) Assessment B UE strength grossly 4/5  -     Row Name 08/21/22 1409          Balance    Balance Assessment sitting static balance;sitting dynamic balance;standing dynamic  balance;standing static balance  -JJ     Static Sitting Balance standby assist  -JJ     Dynamic Sitting Balance standby assist  -JJ     Position, Sitting Balance unsupported;sitting edge of bed  on commode  -JJ     Static Standing Balance contact guard  -JJ     Dynamic Standing Balance contact guard  -JJ     Position/Device Used, Standing Balance supported  -JJ           User Key  (r) = Recorded By, (t) = Taken By, (c) = Cosigned By    Initials Name Provider Type    Blanka Main, OTR/L, CSRS Occupational Therapist               Goals/Plan    No documentation.                Clinical Impression     Row Name 08/21/22 1405          Pain Assessment    Pretreatment Pain Rating 8/10  -     Pain Location - back;buttock  -     Pain Intervention(s) Medication (See MAR);Repositioned;Ambulation/increased activity  -     Row Name 08/21/22 1405          Plan of Care Review    Plan of Care Reviewed With patient  -     Outcome Evaluation OT eval completed. Pt presents alert and oriented x4, up in BR upon arrival. She is reports at baseline being independent with all adls and mobility with use of rollator. Today, she demonstrates decreased balance, strength and activity tolerance. She was able to complete stand to sit t/f from commode with Sup. CGA for all functional mobility in room. Pt with forward flexed posture, often pushing rwx far out in front of her, requiring vcs for positioning of rwx. Decreased safety awareness noted during mobility. She would benefit from skilled OT services to address these deficits but pt to d/c same day as eval. OT to sign off. Recommend HH upon d/c.  -     Row Name 08/21/22 1405          Therapy Assessment/Plan (OT)    Criteria for Skilled Therapeutic Interventions Met (OT) no;other (see comments)  d/c same day as eval  -     Therapy Frequency (OT) evaluation only  -     Row Name 08/21/22 1405          Therapy Plan Review/Discharge Plan (OT)    Anticipated Discharge  Disposition (OT) home with assist;home with home health  -     Row Name 08/21/22 1405          Positioning and Restraints    Pre-Treatment Position bathroom  -JJ     Post Treatment Position chair  -JJ     In Chair notified nsg;reclined;call light within reach;encouraged to call for assist;exit alarm on  -JJ           User Key  (r) = Recorded By, (t) = Taken By, (c) = Cosigned By    Initials Name Provider Type    Blanka Main, OTR/L, CSRS Occupational Therapist               Outcome Measures     Row Name 08/21/22 1409          How much help from another is currently needed...    Putting on and taking off regular lower body clothing? 2  -JJ     Bathing (including washing, rinsing, and drying) 3  -JJ     Toileting (which includes using toilet bed pan or urinal) 3  -JJ     Putting on and taking off regular upper body clothing 4  -JJ     Taking care of personal grooming (such as brushing teeth) 4  -JJ     Eating meals 4  -JJ     AM-PAC 6 Clicks Score (OT) 20  -JJ     Row Name 08/21/22 1400 08/21/22 1000       How much help from another person do you currently need...    Turning from your back to your side while in flat bed without using bedrails? 3  -SB 4  -ALVA    Moving from lying on back to sitting on the side of a flat bed without bedrails? 3  -SB 4  -ALVA    Moving to and from a bed to a chair (including a wheelchair)? 3  -SB 4  -ALVA    Standing up from a chair using your arms (e.g., wheelchair, bedside chair)? 3  -SB 4  -ALVA    Climbing 3-5 steps with a railing? 3  -SB 3  -ALVA    To walk in hospital room? 3  -SB 4  -ALVA    AM-PAC 6 Clicks Score (PT) 18  -SB 23  -ALVA    Highest level of mobility 6 --> Walked 10 steps or more  -SB 7 --> Walked 25 feet or more  -ALVA    Row Name 08/21/22 0800          How much help from another person do you currently need...    Turning from your back to your side while in flat bed without using bedrails? 4  -ALVA     Moving from lying on back to sitting on the side of a flat bed  without bedrails? 4  -ALVA     Moving to and from a bed to a chair (including a wheelchair)? 4  -ALVA     Standing up from a chair using your arms (e.g., wheelchair, bedside chair)? 4  -ALVA     Climbing 3-5 steps with a railing? 3  -ALVA     To walk in hospital room? 4  -ALVA     AM-PAC 6 Clicks Score (PT) 23  -ALVA     Highest level of mobility 7 --> Walked 25 feet or more  -ALVA     Row Name 08/21/22 1409 08/21/22 1400       Functional Assessment    Outcome Measure Options AM-PAC 6 Clicks Daily Activity (OT)  -KISHA AM-PAC 6 Clicks Basic Mobility (PT)  -SB          User Key  (r) = Recorded By, (t) = Taken By, (c) = Cosigned By    Initials Name Provider Type    Triston Juarez, RN Registered Nurse    Sonja Hale, PT DPT Physical Therapist    Blanka Main, OTR/L, CSRS Occupational Therapist              Occupational Therapy Education                 Title: PT OT SLP Therapies (In Progress)     Topic: Occupational Therapy (In Progress)     Point: ADL training (In Progress)     Description:   Instruct learner(s) on proper safety adaptation and remediation techniques during self care or transfers.   Instruct in proper use of assistive devices.              Learning Progress Summary           Patient Acceptance, E, NR by KISHA at 8/21/2022 8059                   Point: Home exercise program (Not Started)     Description:   Instruct learner(s) on appropriate technique for monitoring, assisting and/or progressing therapeutic exercises/activities.              Learner Progress:  Not documented in this visit.          Point: Precautions (In Progress)     Description:   Instruct learner(s) on prescribed precautions during self-care and functional transfers.              Learning Progress Summary           Patient Acceptance, E, NR by KISHA at 8/21/2022 3148                   Point: Body mechanics (Not Started)     Description:   Instruct learner(s) on proper positioning and spine alignment during self-care, functional mobility  activities and/or exercises.              Learner Progress:  Not documented in this visit.                      User Key     Initials Effective Dates Name Provider Type Discipline     11/10/21 -  Blanka Ro, OTR/L, CSRS Occupational Therapist OT              OT Recommendation and Plan  Therapy Frequency (OT): evaluation only  Plan of Care Review  Plan of Care Reviewed With: patient  Outcome Evaluation: OT eval completed. Pt presents alert and oriented x4, up in BR upon arrival. She is reports at baseline being independent with all adls and mobility with use of rollator. Today, she demonstrates decreased balance, strength and activity tolerance. She was able to complete stand to sit t/f from commode with Sup. CGA for all functional mobility in room. Pt with forward flexed posture, often pushing rwx far out in front of her, requiring vcs for positioning of rwx. Decreased safety awareness noted during mobility. She would benefit from skilled OT services to address these deficits but pt to d/c same day as eval. OT to sign off. Recommend  upon d/c.  Plan of Care Reviewed With: patient  Outcome Evaluation: OT eval completed. Pt presents alert and oriented x4, up in BR upon arrival. She is reports at baseline being independent with all adls and mobility with use of rollator. Today, she demonstrates decreased balance, strength and activity tolerance. She was able to complete stand to sit t/f from commode with Sup. CGA for all functional mobility in room. Pt with forward flexed posture, often pushing rwx far out in front of her, requiring vcs for positioning of rwx. Decreased safety awareness noted during mobility. She would benefit from skilled OT services to address these deficits but pt to d/c same day as eval. OT to sign off. Recommend  upon d/c.     Time Calculation:    Time Calculation- OT     Row Name 08/21/22 7127             Time Calculation- OT    OT Start Time 1408  -JJ      OT Stop Time 1450  -JJ       OT Time Calculation (min) 42 min  -KISHA      OT Received On 08/21/22  -KISHA            User Key  (r) = Recorded By, (t) = Taken By, (c) = Cosigned By    Initials Name Provider Type    Blanka Main OTR/L, HUSSAIN Occupational Therapist              Therapy Charges for Today     Code Description Service Date Service Provider Modifiers Qty    [id number] HC OT EVAL LOW COMPLEXITY 3 8/21/2022 Blanka Ro OTR/L, HUSSAIN GO 1             OT Discharge Summary  Anticipated Discharge Disposition (OT): home with assist, home with home health  Reason for Discharge: Discharge from facility  Outcomes Achieved: Discharge from facility occurred on same date as evluation  Discharge Destination: Home with assist, Home with home health    DEMARIO Mad[place], HUSSAIN  8/21/2022

## 2022-08-21 NOTE — THERAPY DISCHARGE NOTE
Patient Name: Jennifer Gomes  : 1940    MRN: [id number]                              Today's Date: 2022       Admit Date: 2022    Visit Dx:     ICD-10-CM ICD-9-CM   1. Altered mental status, unspecified altered mental status type  R41.82 780.97   2. Polypharmacy  Z79.899 V58.69   3. Generalized weakness  R53.1 780.79   4. Spinal stenosis of lumbar region, unspecified whether neurogenic claudication present  M48.061 724.02   5. Dysphagia, unspecified type  R13.10 787.20   6. Frequent falls  R29.6 V15.88   7. Chronic pain syndrome  G89.4 338.4   8. PIERCE (generalized anxiety disorder)  F41.1 300.02   9. Decreased activities of daily living (ADL)  Z78.9 V49.89   10. Impaired mobility  Z74.09 799.89     Patient Active Problem List   Diagnosis   • Gastroesophageal reflux disease   • Spinal stenosis, lumbar region, without neurogenic claudication   • Overweight   • Non-smoker   • Erosion of vaginal mesh ([facility])   • Adenocarcinoma of endometrium ([facility])   • Encounter for consultation   • S/P hysterectomy with oophorectomy   • Encounter for follow-up surveillance of endometrial cancer   • History of radiation therapy   • Obesity (BMI 30-39.9)   • Non-traumatic rhabdomyolysis   • Metabolic encephalopathy   • Acute renal failure superimposed on stage 3 chronic kidney disease ([facility])   • Acute respiratory failure with hypoxia and hypercapnia ([facility])   • Medical non-compliance, does not take narcotics as prescribed   • SIRS (systemic inflammatory response syndrome) ([facility])   • Chronic constipation   • Chronic pain syndrome   • Chronic prescription opiate use   • Normocytic anemia   • Hypothyroidism (acquired)   • Essential hypertension   • TOMÁS (acute kidney injury) ([facility]ly [facility])   • Venous insufficiency of both lower extremities   • Fluid retention   • Low blood pressure reading   • Obesity, Class III, BMI 40-49.9 (morbid obesity) ([facility])   • Chronic intractable headache   • RAFAL (obstructive sleep apnea)   • Change in bowel habits    • Altered mental status   • Toxic metabolic encephalopathy   • Polypharmacy   • Frequent falls     Past Medical History:   Diagnosis Date   • Anxiety    • Arthritis    • Cancer (HCC)     uterine   • Chronic pain    • Depression    • Disease of thyroid gland    • Fibromyalgia    • Headache    • Hyperlipidemia    • Hypertension    • Incontinence    • Insomnia    • Leg pain    • Lumbar stenosis    • Peptic ulcer    • Restless legs    • Vaginal bleeding      Past Surgical History:   Procedure Laterality Date   • BLADDER REPAIR  2011    MESH HAD TO BE REMOVED IN 2013   • BREAST BIOPSY Right 2017    benign   • BREAST CYST EXCISION Left 1982   • CARDIAC CATHETERIZATION     • CARPAL TUNNEL RELEASE     • CATARACT EXTRACTION W/ INTRAOCULAR LENS  IMPLANT, BILATERAL     • COLONOSCOPY     • COLONOSCOPY N/A 10/1/2021    Procedure: COLONOSCOPY WITH ANESTHESIA;  Surgeon: Tom Velasco DO;  Location: [facility] ENDOSCOPY;  Service: Gastroenterology;  Laterality: N/A;  pre: change in bowel habits  post: diverticulosis. hemorrhoids.   Olivia Mora APRN       • CYSTECTOMY     • D & C HYSTEROSCOPY N/A 11/6/2017    Procedure: DILATATION AND CURETTAGE HYSTEROSCOPY;  Surgeon: Shasta Madrigal MD;  Location: [facility] OR;  Service:    • DILATION AND CURETTAGE, DIAGNOSTIC / THERAPEUTIC  2008   • ENDOSCOPY  09/23/2010    Short segment of Arriola's,Moderate chroninc esophagogastritis and negative H.pylori   • ENDOSCOPY N/A 9/25/2017    Procedure: ESOPHAGOGASTRODUODENOSCOPY WITH ANESTHESIA;  Surgeon: Tom Velasco DO;  Location:  [facility] ENDOSCOPY;  Service:    • HYSTERECTOMY  12/20/2017   • ORIF TIBIA/FIBULA FRACTURES Left 2000   • TRANSVAGINAL TAPING SUSPENSION N/A 11/6/2017    Procedure: VAGINAL MESH REVISION;  Surgeon: Shasta Madrigal MD;  Location: [facility] OR;  Service:    • VAGINAL MESH REVISION  2013      General Information     Row Name 08/21/22 1400          Physical Therapy Time and Intention    Document Type evaluation   presents with AMS;PMH Anxiety, Arthritis, Chronic pain, Depression, Disease of thyroid gland, Fibromyalgia, Headache, HLD, HTN, Incontinence, Insomnia, Leg pain, Lumbar stenosis, Peptic ulcer, Restless legs  -SB     Mode of Treatment physical therapy  imaging reveals severe stenosis at L4/5 secondary to spondylolisthesi  -SB     Row Name 08/21/22 1400          General Information    Patient Profile Reviewed yes  -SB     Prior Level of Function independent:;all household mobility;ADL's  rollator, walk in tub, BSC, shower chair  -SB     Existing Precautions/Restrictions fall  -SB     Barriers to Rehab medically complex  -SB     Row Name 08/21/22 1400          Living Environment    People in Home child(sebastian), adult;spouse  -SB     Row Name 08/21/22 1400          Home Main Entrance    Number of Stairs, Main Entrance three  -SB     Stair Railings, Main Entrance none  -SB     Row Name 08/21/22 1400          Stairs Within Home, Primary    Number of Stairs, Within Home, Primary none  -SB     Row Name 08/21/22 1400          Cognition    Orientation Status (Cognition) oriented x 4  -SB     Row Name 08/21/22 1400          Safety Issues, Functional Mobility    Safety Issues Affecting Function (Mobility) awareness of need for assistance;positioning of assistive device  -SB     Impairments Affecting Function (Mobility) pain;endurance/activity tolerance;strength;balance  -SB           User Key  (r) = Recorded By, (t) = Taken By, (c) = Cosigned By    Initials Name Provider Type    Sonja Hale PT DPT Physical Therapist               Mobility     Row Name 08/21/22 1400          Bed Mobility    Comment, (Bed Mobility) in BR upon arrival  -SB     Row Name 08/21/22 1400          Sit-Stand Transfer    Sit-Stand [place] (Transfers) contact guard;verbal cues  -SB     Assistive Device (Sit-Stand Transfers) walker, front-wheeled  -SB     Row Name 08/21/22 1400          Gait/Stairs (Locomotion)    [place] Level (Gait) contact  guard;verbal cues  -SB     Assistive Device (Gait) walker, front-wheeled  -SB     Distance in Feet (Gait) 10  -SB     Bilateral Gait Deviations forward flexed posture  -SB     Comment, (Gait/Stairs) sustained knee and hip flexion as well as forward flexed trunk during gait  -SB           User Key  (r) = Recorded By, (t) = Taken By, (c) = Cosigned By    Initials Name Provider Type    SB Sonja Castellanos PT DPT Physical Therapist               Obj/Interventions     Row Name 08/21/22 1400          Range of Motion Comprehensive    General Range of Motion bilateral lower extremity ROM WFL  -SB     Row Name 08/21/22 1400          Strength Comprehensive (MMT)    General Manual Muscle Testing (MMT) Assessment lower extremity strength deficits identified  -SB     Comment, General Manual Muscle Testing (MMT) Assessment BLE 4/5  -SB     Row Name 08/21/22 1400          Balance    Balance Assessment sitting static balance;sitting dynamic balance;standing static balance;standing dynamic balance  -SB     Static Sitting Balance standby assist  -SB     Dynamic Sitting Balance standby assist  -SB     Position, Sitting Balance unsupported;other (see comments)  toilet  -SB     Static Standing Balance contact guard  -SB     Dynamic Standing Balance contact guard  -SB     Position/Device Used, Standing Balance walker, rolling  -SB     Row Name 08/21/22 1400          Sensory Assessment (Somatosensory)    Sensory Assessment (Somatosensory) LE sensation intact  -SB           User Key  (r) = Recorded By, (t) = Taken By, (c) = Cosigned By    Initials Name Provider Type    Sonja Hale PT DPT Physical Therapist               Goals/Plan    No documentation.                Clinical Impression     Row Name 08/21/22 1400          Pain    Pretreatment Pain Rating 8/10  -SB     Pain Location - Side/Orientation Bilateral  -SB     Pain Location lower  -SB     Pain Location - back;extremity  -SB     Pain Intervention(s) Medication (See  MAR);Repositioned;Ambulation/increased activity  -SB     Row Name 08/21/22 1400          Plan of Care Review    Plan of Care Reviewed With patient  -SB     Progress no change  -SB     Outcome Evaluation PT eval completed. Pt alert and oriented x4 and in BR upon arrival. Pt performs sit to stand with SBA and ambulates in room with CGA and RW. Pt reqd cues for positioning of AD and maintained B hip and knee flexion during gait training, as well as forward flexed posture. Pt edu on importance of keeping AD in close proximity to body to decrease fall risk. Pt verbalizes understanding. Pt to d/c home same day as eval, therefore PT to sign off. Recommend d/c home with assist and HH.  -SB     Row Name 08/21/22 1400          Therapy Assessment/Plan (PT)    Patient/Family Therapy Goals Statement (PT) go home  -SB     Criteria for Skilled Interventions Met (PT) other (see comments)  d/c same day  -SB     Therapy Frequency (PT) evaluation only  -SB     Row Name 08/21/22 1400          Vital Signs    O2 Delivery Pre Treatment room air  -SB     Row Name 08/21/22 1400          Positioning and Restraints    Pre-Treatment Position bathroom  -SB     Post Treatment Position chair  -SB     In Chair notified nsg;reclined;call light within reach;encouraged to call for assist;exit alarm on;with OT  -SB           User Key  (r) = Recorded By, (t) = Taken By, (c) = Cosigned By    Initials Name Provider Type    Sonja Hale, PT DPT Physical Therapist               Outcome Measures     Row Name 08/21/22 1400 08/21/22 1000       How much help from another person do you currently need...    Turning from your back to your side while in flat bed without using bedrails? 3  -SB 4  -ALVA    Moving from lying on back to sitting on the side of a flat bed without bedrails? 3  -SB 4  -ALVA    Moving to and from a bed to a chair (including a wheelchair)? 3  -SB 4  -ALVA    Standing up from a chair using your arms (e.g., wheelchair, bedside chair)? 3  -SB 4   -ALVA    Climbing 3-5 steps with a railing? 3  -SB 3  -ALVA    To walk in hospital room? 3  -SB 4  -ALVA    AM-PAC 6 Clicks Score (PT) 18  -SB 23  -ALVA    Highest level of mobility 6 --> Walked 10 steps or more  -SB 7 --> Walked 25 feet or more  -    Row Name 08/21/22 0800          How much help from another person do you currently need...    Turning from your back to your side while in flat bed without using bedrails? 4  -ALVA     Moving from lying on back to sitting on the side of a flat bed without bedrails? 4  -ALVA     Moving to and from a bed to a chair (including a wheelchair)? 4  -ALVA     Standing up from a chair using your arms (e.g., wheelchair, bedside chair)? 4  -ALVA     Climbing 3-5 steps with a railing? 3  -ALVA     To walk in hospital room? 4  -ALVA     AM-PAC 6 Clicks Score (PT) 23  -ALVA     Highest level of mobility 7 --> Walked 25 feet or more  -     Row Name 08/21/22 1409 08/21/22 1400       Functional Assessment    Outcome Measure Options AM-PAC 6 Clicks Daily Activity (OT)  -JJ AM-PAC 6 Clicks Basic Mobility (PT)  -SB          User Key  (r) = Recorded By, (t) = Taken By, (c) = Cosigned By    Initials Name Provider Type    Triston Juarez, RN Registered Nurse    Sonja Hale, PT DPT Physical Therapist    Blanka Main, OTR/L, CSRS Occupational Therapist              Physical Therapy Education                 Title: PT OT SLP Therapies (In Progress)     Topic: Physical Therapy (In Progress)     Point: Mobility training (Done)     Learning Progress Summary           Patient Acceptance, E, VU by SB at 8/21/2022 1410    Comment: pt edu on POC, benefits of act and d/c plans                   Point: Home exercise program (Not Started)     Learner Progress:  Not documented in this visit.          Point: Body mechanics (Not Started)     Learner Progress:  Not documented in this visit.          Point: Precautions (Done)     Learning Progress Summary           Patient Acceptance, E, VU by SB at  8/21/2022 1410    Comment: pt edu on POC, benefits of act and d/c plans                               User Key     Initials Effective Dates Name Provider Type Discipline    SB 06/16/21 -  Sonja Castellanos PT DPT Physical Therapist PT              PT Recommendation and Plan     Plan of Care Reviewed With: patient  Progress: no change  Outcome Evaluation: PT eval completed. Pt alert and oriented x4 and in BR upon arrival. Pt performs sit to stand with SBA and ambulates in room with CGA and RW. Pt reqd cues for positioning of AD and maintained B hip and knee flexion during gait training, as well as forward flexed posture. Pt edu on importance of keeping AD in close proximity to body to decrease fall risk. Pt verbalizes understanding. Pt to d/c home same day as eval, therefore PT to sign off. Recommend d/c home with assist and HH.     Time Calculation:    PT Charges     Row Name 08/21/22 1503             Time Calculation    Start Time 1400  -SB      Stop Time 1445  -SB      Time Calculation (min) 45 min  -SB      PT Received On 08/21/22  -SB            User Key  (r) = Recorded By, (t) = Taken By, (c) = Cosigned By    Initials Name Provider Type    SB Sonja Castellanos, HUNTER DPT Physical Therapist              Therapy Charges for Today     Code Description Service Date Service Provider Modifiers Qty    [id number] HC PT EVAL LOW COMPLEXITY 3 8/21/2022 Sonja Castellanos PT DPT GP 1          PT G-Codes  Outcome Measure Options: AM-PAC 6 Clicks Daily Activity (OT)  AM-PAC 6 Clicks Score (PT): 18  AM-PAC 6 Clicks Score (OT): 20    PT Discharge Summary  Anticipated Discharge Disposition (PT): home with assist, home with home health    Sonja Castellanos PT DPT  8/21/2022

## 2022-08-21 NOTE — PLAN OF CARE
Goal Outcome Evaluation:  Plan of Care Reviewed With: patient           Outcome Evaluation: OT eval completed. Pt presents alert and oriented x4, up in BR upon arrival. She is reports at baseline being independent with all adls and mobility with use of rollator. Today, she demonstrates decreased balance, strength and activity tolerance. She was able to complete stand to sit t/f from commode with Sup. CGA for all functional mobility in room. Pt with forward flexed posture, often pushing rwx far out in front of her, requiring vcs for positioning of rwx. Decreased safety awareness noted during mobility. She would benefit from skilled OT services to address these deficits but pt to d/c same day as eval. OT to sign off. Recommend HH upon d/c.

## 2022-08-22 ENCOUNTER — TELEPHONE (OUTPATIENT)
Dept: INTERNAL MEDICINE | Age: 82
End: 2022-08-22

## 2022-08-22 ENCOUNTER — TELEPHONE (OUTPATIENT)
Dept: NEUROSURGERY | Facility: CLINIC | Age: 82
End: 2022-08-22

## 2022-08-22 LAB
QT INTERVAL: 416 MS
QTC INTERVAL: 464 MS

## 2022-08-22 NOTE — TELEPHONE ENCOUNTER
Left msg, trying to schedule patients hospital follow up. Direct line given to call back to schedule

## 2022-08-22 NOTE — OUTREACH NOTE
Prep Survey    Flowsheet Row Responses   [facility] facility patient discharged from? [place]   Is LACE score < 7 ? No   Emergency Room discharge w/ pulse ox? No   Eligibility Readm Mgmt   Discharge diagnosis Toxic metabolic encephalopathy, polypharmacy   Does the patient have one of the following disease processes/diagnoses(primary or secondary)? Other   Does the patient have Home health ordered? No   Is there a DME ordered? No   Prep survey completed? Yes          HERNANDO Ross Registered Nurse

## 2022-08-22 NOTE — TELEPHONE ENCOUNTER
[facility] Transitions Initial Follow Up Call    Outreach made within 2 business days of discharge: Yes    Patient: Adolfo Hendrix   Patient : 1940 MRN: [id number]    Reason for Admission: SOB, Altered mental status, falls    Discharge Date: 2022      Discharge Diagnoses: Active Hospital Problems   Diagnosis    **Toxic metabolic encephalopathy    Polypharmacy    Frequent falls    Spinal stenosis, lumbar region, without neurogenic claudication    Chronic pain syndrome    Normocytic anemia    Chronic intractable headache    Obesity, Class III, BMI 40-49.9 (morbid obesity) ([facility] [address]    Venous insufficiency of both lower extremities      Spoke with: Patient    Discharge department/facility: [facility]    TCM Interactive Patient Contact:  Was patient able to fill all prescriptions: Yes  Was patient instructed to bring all medications to the follow-up visit: Yes  Is patient taking all medications as directed in the discharge summary? Yes  Does patient understand their discharge instructions: Yes  Does patient have questions or concerns that need addressed prior to 7-14 day follow up office visit: no    Spoke with the pt to see how she is doing since discharge and she said that she is doing ok and just dealing with the same problems she is always having. I asked her if she needed anything or any medications and she said no. She was not given any new medications and she wanted to come in next week for follow up. Pt is scheduled.     Scheduled appointment with PCP within 7-14 days    Follow Up  Future Appointments   Date Time Provider Negrita Lopez   2022  2:30 PM Marylene Prudent, APRN [id number]       [address], [address]

## 2022-08-22 NOTE — TELEPHONE ENCOUNTER
Patients daughter called in and left  to try and schedule f/u appt. I attempted to call back and have left a 2nd msg for her to return my call. Direct line given

## 2022-08-23 ENCOUNTER — OFFICE VISIT (OUTPATIENT)
Dept: OBSTETRICS AND GYNECOLOGY | Facility: CLINIC | Age: 82
End: 2022-08-23

## 2022-08-23 VITALS
BODY MASS INDEX: 39.75 KG/M2 | SYSTOLIC BLOOD PRESSURE: 118 MMHG | WEIGHT: 216 LBS | HEIGHT: 62 IN | DIASTOLIC BLOOD PRESSURE: 68 MMHG

## 2022-08-23 DIAGNOSIS — F51.01 PRIMARY INSOMNIA: ICD-10-CM

## 2022-08-23 DIAGNOSIS — Z79.899 POLYPHARMACY: ICD-10-CM

## 2022-08-23 DIAGNOSIS — F41.9 ANXIETY: Primary | ICD-10-CM

## 2022-08-23 PROCEDURE — 99214 OFFICE O/P EST MOD 30 MIN: CPT | Performed by: OBSTETRICS & GYNECOLOGY

## 2022-08-23 NOTE — PROGRESS NOTES
"Subjective   Chief Complaint   Patient presents with   • Med Refill     Pt here for anxiety med check.     Jennifer Gomes is a 81 y.o. year old .  No LMP recorded (lmp unknown). Patient has had a hysterectomy.  She presents to be seen, accompanied by her daughter, for hospital follow-up.  Patient recently admitted to hospital because of polypharmacy and frequent falls.  Patient was discharged home two days ago, with recommendations to decrease several of her medications, including the xanax that she gets from me.    Patient says that the problem started because she was having problems sitting down, and assumed that it was due to hemorrhoids.  Because of pain with sitting, the patient says that she was spending a lot of time laying on her side.  The patient reports that investigating the area with her hand made her feel like her \"colon was coming out\".  The patient says that one day the problems just resolved and it \"popped back up inside\".  Patient wonders whether her hysterectomy has caused any of this problem.  Patient says that it feels 99% better.  Patient says that she was really having a bad week last week, and had to cancel several different appointments, because she was simply too weak to get into the care.  911 was called, and the patient spent from Friday to  in [facility].  Hospital notes document that EMS workers stated patient \"looked worse today than yesterday\".  Oxygen saturation was only 80% at time of presentation.  Patient c/o difficulty finding words and says she has a hard time speaking.    The following portions of the patient's history were reviewed and updated as appropriate:current medications and allergies    Social History    Tobacco Use      Smoking status: Never Smoker      Smokeless tobacco: Never Used    Review of Systems   Constitutional: Negative for activity change and unexpected weight change.   Respiratory: Negative for shortness of breath.    Cardiovascular: " "Negative for chest pain.   Musculoskeletal: Positive for gait problem (in wheelchair today).         Objective   /68   Ht 157.5 cm (62\")   Wt 98 kg (216 lb)   LMP  (LMP Unknown)   Breastfeeding No   BMI 39.51 kg/m²     Physical Exam  Vitals and nursing note reviewed.   Constitutional:       General: She is not in acute distress.     Appearance: She is well-developed. She is obese.   HENT:      Head: Normocephalic and atraumatic.   Neck:      Thyroid: No thyromegaly.   Pulmonary:      Effort: Pulmonary effort is normal.   Abdominal:      General: There is no distension.      Palpations: Abdomen is soft.      Tenderness: There is no abdominal tenderness.   Musculoskeletal:      Cervical back: Normal range of motion.      Comments: Patient in wheelchair today   Skin:     General: Skin is warm and dry.   Neurological:      Mental Status: She is alert and oriented to person, place, and time.   Psychiatric:         Behavior: Behavior normal.         Judgment: Judgment normal.         Lab Review   No data reviewed    Imaging   No data reviewed     Assessment & Plan    Diagnoses and all orders for this visit:    1. Anxiety (Primary): Long conversation with the patient and her daughter about hospitalization for polypharmacy.  We have discussed weaning the patient's Xanax dose at every visit for the past couple of years.  The patient wants to discuss weaning every 90 days when I see her, but only wants to talk about her interest in cutting back; she never actually follows the instructions.  In addition, the patient never takes her Xanax as prescribed, but \"saves\" her daytime dose so that she can stack them together at night.  At one point, the patient was taking 2-1/2 mg every night.  More recently, she has been taking 2 mg every night.  The patient has been strongly advised to take the medication, as prescribed, which is 1 mg every 12 hours.  I have instructed her to start weaning back the daytime dose, just " cutting back a quarter of a tablet or a half a tablet at a time, and not changing her dose more frequently than 2 weeks.  Finally, because the patient suffers from significant insomnia, I expect that she will probably need to keep her nighttime dose, although that should not be more than 1 mg to help her sleep.  During the day, the patient understands that she should not be taking Xanax and Roxicodone at the same time.  The patient voices understanding of all these instructions, although she continues to assert her fear of withdrawal symptoms.  I have reassured the patient that she should not suffer significant withdrawal if she is cutting back a quarter of a tablet at a time.  The patient's fears are somewhat based on her previous experience where the patient tried to stop abruptly and had a seizure; this was about 2 years ago.  Finally, the patient is asked whether her Zoloft should be increased to offset the planned reduction in her Xanax.  I do not have any plans to increase her Zoloft at this time.    2. Primary insomnia    3. Polypharmacy: Patient taking several medications which contribute to her polypharmacy, which are not from this office.  Patient has been strongly advised to contact her primary care office to discuss what medication changes they recommend.    45 minutes was spent with the patient and her daughter, discussing plans for medication changes, strategies for helping the patient manage her medication, and answering questions relating to her recent hospitalization.  The patient is currently managing all her medications, but admits that she frequently forgets whether she is taking something or not.  I recommended that medications be placed in a pill container, with doses prefilled by her daughter for each medication administration.    This note was electronically signed.    Shasta Madrigal MD  August 23, 2022  13:46 CDT    Total time spent today with Jennifer  was 30-39 minutes (level 4).  Greater  than 50% of the time was spent coordinating care, answering her questions and counseling regarding pathophysiology of her presenting problem along with plans for any diagnositc work-up and treatment.

## 2022-08-24 ENCOUNTER — READMISSION MANAGEMENT (OUTPATIENT)
Dept: CALL CENTER | Facility: HOSPITAL | Age: 82
End: 2022-08-24

## 2022-08-24 NOTE — OUTREACH NOTE
Medical Week 1 Survey    Flowsheet Row Responses   [facility] patient discharged from? [place]   Does the patient have one of the following disease processes/diagnoses(primary or secondary)? Other   Week 1 attempt successful? Yes   Call start time 1537   Call end time 1545   Discharge diagnosis Toxic metabolic encephalopathy, polypharmacy   Meds reviewed with patient/caregiver? Yes   Does the patient have all medications ordered at discharge? N/A   Is the patient taking all medications as directed (includes completed medication regime)? Yes   Medication comments Went over med changes.  She wanted to know why the stopped the aricept.  Advised to discuss at her PCP appt.   Does the patient have a primary care provider?  Yes   Does the patient have an appointment with their PCP within 7 days of discharge? Yes   Has the patient kept scheduled appointments due by today? Yes   Comments Had follow up with OB/GYN yesterday and has a scheduled appt with PCP   Psychosocial issues? No   Did the patient receive a copy of their discharge instructions? Yes   Nursing interventions Reviewed instructions with patient   What is the patient's perception of their health status since discharge? Same   Is the patient/caregiver able to teach back signs and symptoms related to disease process for when to call PCP? Yes   Is the patient/caregiver able to teach back signs and symptoms related to disease process for when to call 911? Yes   Is the patient/caregiver able to teach back the hierarchy of who to call/visit for symptoms/problems? PCP, Specialist, Home health nurse, Urgent Care, ED, 911 Yes   Additional teach back comments States she is doing the same.  Has appt coming up with her PCP and going to discuss changes in medication.   Week 1 call completed? Yes   Wrap up additional comments Pt to discuss med changes with PCP          ELVIS BOSWELL - Licensed Nurse

## 2022-08-26 DIAGNOSIS — M54.2 NECK PAIN: ICD-10-CM

## 2022-08-29 RX ORDER — CYCLOBENZAPRINE HCL 10 MG
10 TABLET ORAL 3 TIMES DAILY PRN
Qty: 90 TABLET | Refills: 0 | Status: SHIPPED | OUTPATIENT
Start: 2022-08-29 | End: 2022-08-30

## 2022-08-29 NOTE — TELEPHONE ENCOUNTER
Jocy Patton called requesting a refill of the below medication which has been pended for you:     Requested Prescriptions     Pending Prescriptions Disp Refills    cyclobenzaprine (FLEXERIL) 10 MG tablet [Pharmacy Med Name: CYCLOBENZAPRINE HYDROCHLORIDE 10MG TABLET] 90 tablet 0     Sig: TAKE 1 TABLET BY MOUTH 3 TIMES DAILY AS NEEDED FOR MUSCLE SPASMS       Last Appointment Date: 5/3/2022  Next Appointment Date: 8/30/2022    Allergies   Allergen Reactions    Ambien [Zolpidem Tartrate]     Codeine     Lyrica [Pregabalin]     Morphine     Requip [Ropinirole Hcl]     Tizanidine      Terrible nightmares

## 2022-08-30 ENCOUNTER — OFFICE VISIT (OUTPATIENT)
Dept: INTERNAL MEDICINE | Age: 82
End: 2022-08-30
Payer: MEDICARE

## 2022-08-30 VITALS — HEART RATE: 80 BPM | DIASTOLIC BLOOD PRESSURE: 78 MMHG | OXYGEN SATURATION: 96 % | SYSTOLIC BLOOD PRESSURE: 130 MMHG

## 2022-08-30 DIAGNOSIS — G93.40 ENCEPHALOPATHY: ICD-10-CM

## 2022-08-30 DIAGNOSIS — G89.4 CHRONIC PAIN SYNDROME: ICD-10-CM

## 2022-08-30 DIAGNOSIS — F41.9 ANXIETY: ICD-10-CM

## 2022-08-30 DIAGNOSIS — M54.2 NECK PAIN: ICD-10-CM

## 2022-08-30 PROCEDURE — 99495 TRANSJ CARE MGMT MOD F2F 14D: CPT | Performed by: NURSE PRACTITIONER

## 2022-08-30 RX ORDER — CYCLOBENZAPRINE HCL 10 MG
10 TABLET ORAL 2 TIMES DAILY PRN
Qty: 90 TABLET | Refills: 0
Start: 2022-08-30 | End: 2022-10-18

## 2022-08-30 ASSESSMENT — PATIENT HEALTH QUESTIONNAIRE - PHQ9
2. FEELING DOWN, DEPRESSED OR HOPELESS: 0
1. LITTLE INTEREST OR PLEASURE IN DOING THINGS: 0
SUM OF ALL RESPONSES TO PHQ QUESTIONS 1-9: 0
SUM OF ALL RESPONSES TO PHQ9 QUESTIONS 1 & 2: 0
SUM OF ALL RESPONSES TO PHQ QUESTIONS 1-9: 0

## 2022-08-30 NOTE — PROGRESS NOTES
Post-Discharge Transitional Care Management Progress Note      Kate Meehan   YOB: 1940    Date of Office Visit:  8/30/2022  Date of Hospital Admission: 8/19/2022  Date of Hospital Discharge: 8/22/2022    Care management risk score Rising risk (score 2-5) and Complex Care (Scores >=6): No Risk Score On File     Non face to face  following discharge, date last encounter closed (first attempt may have been earlier): 08/22/2022 08/22/2022    Call initiated 2 business days of discharge: Yes    ASSESSMENT/PLAN:   Neck pain  -     cyclobenzaprine (FLEXERIL) 10 MG tablet; Take 1 tablet by mouth 2 times daily as needed for Muscle spasms, Disp-90 tablet, R-0NO PRINT  -     oxyCODONE HCl 15 MG TABA; Take 15 mg by mouth 4 times daily for 3 days. , Disp-120 tablet, R-0Reduce doses taken as pain becomes manageableNO PRINT  Encephalopathy  Assessment & Plan:  [facility]; Meds were decreased    Chronic pain syndrome  Assessment & Plan:  goies to pain management;       Orders:  -     oxyCODONE HCl 15 MG TABA; Take 15 mg by mouth 4 times daily for 3 days. , Disp-120 tablet, R-0Reduce doses taken as pain becomes manageableNO PRINT  Anxiety  Assessment & Plan:  Wants to wean herself off xanax;       Medical Decision Making: moderate complexity  No follow-ups on file. Subjective:   HPI:  Follow up of Hospital problems/diagnosis(es):    Encephalopathy    Chronic pain     Inpatient course: Discharge summary reviewed- see chart. Interval history/Current status:    Toxic metabolic encephalopathy   Spinal stenosis Impression:   1. Similar grade 1 spondylolisthesis at the L4/L5 level with advanced  facet arthropathy and moderate disc bulging resulting in severe L4/L5  central spinal canal stenosis and mild to moderate bilateral foraminal  narrowing. No acute lumbar vertebral pathology identified.      This report was finalized on 08/19/2022 12:43 by Dr. Yvette Echeverria MD.       The admitting doctors held her Aricept. They decreased her Xanax Flexeril and oxycodone. They completely stopped her Lyrica but she probably than she cannot be without it and it was restarted. She became more alert requesting to go home  Chronic pain syndrome; She is to have a 1 month follow-up with Dr. Chris Alva and he suggested outpatient physical therapy occupational therapy not with home health;   4. Anxiety ;  she gest from DR Cathy Dickerson;  she wants her to take 1 1/2 mg at bedtime ; she wants to get off; this;  will do this for a month, then then 1 mg for a month then another month 1/2;  ;  but Dr Cathy Dickerson gives her this and she will discuss with her at next OV   Patient Active Problem List   Diagnosis    Trochanteric bursitis of both hips    Spinal stenosis at L4-L5 level    Hypothyroidism (acquired)    Hypertension    Mixed hyperlipidemia    Stage 3 chronic kidney disease ([address]    Fibrocystic breast disease    Chronic pain    Lumbar facet arthropathy    Adenocarcinoma of endometrium, stage 1 ([address]    Coronary artery disease involving native coronary artery of native heart without angina pectoris    Vitamin D deficiency    Other headache syndrome    Persistent headaches    Anxiety    Anemia    Gastroesophageal reflux disease without esophagitis    Other sleep apnea    Drug-induced constipation    Neck pain    Sinus pain    Maxillary sinus mass    Bilateral lower extremity edema    Left sided sciatica    Encephalopathy       Medications listed as ordered at the time of discharge from hospital     Medication List            Accurate as of August 30, 2022  2:33 PM. If you have any questions, ask your nurse or doctor. START taking these medications      oxyCODONE HCl 15 MG Taba  Take 15 mg by mouth 4 times daily for 3 days.   Started by: TAMIA Modi            CHANGE how you take these medications      cyclobenzaprine 10 MG tablet  Commonly known as: FLEXERIL  Take 1 tablet by mouth 2 times daily as needed for Muscle spasms  What changed: when to take this  Changed by: TAMIA Naqvi            CONTINUE taking these medications      ALPRAZolam 0.5 MG tablet  Commonly known as: XANAX     atorvastatin 40 MG tablet  Commonly known as: LIPITOR  TAKE 1 TABLET DAILY AS DIRECTED     bumetanide 1 MG tablet  Commonly known as: BUMEX  TAKE 1 TABLET DAILY     butalbital-acetaminophen-caffeine -40 MG per tablet  Commonly known as: FIORICET, ESGIC     carvedilol 12.5 MG tablet  Commonly known as: COREG  TAKE 1 TABLET TWICE A DAY     diclofenac 75 MG EC tablet  Commonly known as: VOLTAREN  TAKE 1 TABLET BY MOUTH 2 TIMES DAILY     donepezil 10 MG tablet  Commonly known as: ARICEPT  TAKE 1 TABLET BY MOUTH NIGHTLY     ipratropium 0.06 % nasal spray  Commonly known as: ATROVENT     lansoprazole 30 MG delayed release capsule  Commonly known as: PREVACID  TAKE 1 CAPSULE DAILY EVERY MORNING     levothyroxine 50 MCG tablet  Commonly known as: SYNTHROID  TAKE 1 TABLET DAILY     sertraline 25 MG tablet  Commonly known as: ZOLOFT     SUMAtriptan 50 MG tablet  Commonly known as: IMITREX  TAKE 1 TABLET BY MOUTH TWICE DAILY AS NEEDED FOR MIGRAINE. MAX 2 TABLETS DAILY. venlafaxine 25 MG tablet  Commonly known as: EFFEXOR     vitamin D 1.25 MG (72545 UT) Caps capsule  Commonly known as: ERGOCALCIFEROL  TAKE 1 CAPSULE BY MOUTH ONCE A WEEK               Where to Get Your Medications        Information about where to get these medications is not yet available    Ask your nurse or doctor about these medications  cyclobenzaprine 10 MG tablet  oxyCODONE HCl 15 MG Taba           Medications marked \"taking\" at this time  Outpatient Medications Marked as Taking for the 8/30/22 encounter (Office Visit) with TAMIA Naqvi   Medication Sig Dispense Refill    cyclobenzaprine (FLEXERIL) 10 MG tablet Take 1 tablet by mouth 2 times daily as needed for Muscle spasms 90 tablet 0    oxyCODONE HCl 15 MG TABA Take 15 mg by mouth 4 times daily for 3 days.

## 2022-09-01 ENCOUNTER — READMISSION MANAGEMENT (OUTPATIENT)
Dept: CALL CENTER | Facility: HOSPITAL | Age: 82
End: 2022-09-01

## 2022-09-01 NOTE — OUTREACH NOTE
Medical Week 2 Survey    Flowsheet Row Responses   [facility] facility patient discharged from? [place]   Does the patient have one of the following disease processes/diagnoses(primary or secondary)? Other   Week 2 attempt successful? No   Unsuccessful attempts Attempt 1          KATERYNA CRAIG - Registered Nurse

## 2022-09-07 ENCOUNTER — APPOINTMENT (OUTPATIENT)
Dept: MRI IMAGING | Facility: HOSPITAL | Age: 82
End: 2022-09-07

## 2022-09-26 ENCOUNTER — TREATMENT (OUTPATIENT)
Dept: PHYSICAL THERAPY | Facility: CLINIC | Age: 82
End: 2022-09-26

## 2022-09-26 DIAGNOSIS — Z78.9 IMPAIRED INSTRUMENTAL ACTIVITIES OF DAILY LIVING (IADL): ICD-10-CM

## 2022-09-26 DIAGNOSIS — R68.89 DECREASED FUNCTIONAL ACTIVITY TOLERANCE: ICD-10-CM

## 2022-09-26 DIAGNOSIS — R29.898 WEAKNESS OF BOTH ARMS: Primary | ICD-10-CM

## 2022-09-26 PROCEDURE — 97166 OT EVAL MOD COMPLEX 45 MIN: CPT

## 2022-09-26 NOTE — PROGRESS NOTES
Occupational Therapy Initial Evaluation and Plan of Care    Patient: Jennifer Gomes   : 1940  Referring practitioner: Angel Hay DO  Date of Initial Visit: 2022  Today's Date: 2022  Patient seen for 1 sessions    Visit Diagnoses:    ICD-10-CM ICD-9-CM   1. Weakness of both arms  R29.898 729.89   2. Decreased functional activity tolerance  R68.89 780.99   3. Impaired instrumental activities of daily living (IADL)  Z78.9 V49.89     Past Medical History:   Diagnosis Date   • Anxiety    • Arthritis    • Cancer (HCC)     uterine   • Chronic pain    • Depression    • Disease of thyroid gland    • Fibromyalgia    • Headache    • Hyperlipidemia    • Hypertension    • Incontinence    • Insomnia    • Leg pain    • Lumbar stenosis    • Peptic ulcer    • Restless legs    • Vaginal bleeding      Past Surgical History:   Procedure Laterality Date   • BLADDER REPAIR      MESH HAD TO BE REMOVED IN    • BREAST BIOPSY Right 2017    benign   • BREAST CYST EXCISION Left    • CARDIAC CATHETERIZATION     • CARPAL TUNNEL RELEASE     • CATARACT EXTRACTION W/ INTRAOCULAR LENS  IMPLANT, BILATERAL     • COLONOSCOPY     • COLONOSCOPY N/A 10/1/2021    Procedure: COLONOSCOPY WITH ANESTHESIA;  Surgeon: Tom Velasco DO;  Location: [facility] ENDOSCOPY;  Service: Gastroenterology;  Laterality: N/A;  pre: change in bowel habits  post: diverticulosis. hemorrhoids.   Olivia Mora APRN       • CYSTECTOMY     • D & C HYSTEROSCOPY N/A 2017    Procedure: DILATATION AND CURETTAGE HYSTEROSCOPY;  Surgeon: Shasta Madrigal MD;  Location: [facility] OR;  Service:    • DILATION AND CURETTAGE, DIAGNOSTIC / THERAPEUTIC     • ENDOSCOPY  2010    Short segment of Arriola's,Moderate chroninc esophagogastritis and negative H.pylori   • ENDOSCOPY N/A 2017    Procedure: ESOPHAGOGASTRODUODENOSCOPY WITH ANESTHESIA;  Surgeon: Tom Velasco DO;  Location:   [facility] ENDOSCOPY;  Service:    • HYSTERECTOMY  2017   • ORIF TIBIA/FIBULA FRACTURES Left    • TRANSVAGINAL TAPING SUSPENSION N/A 2017    Procedure: VAGINAL MESH REVISION;  Surgeon: Shasta Madrigal MD;  Location:  [facility] OR;  Service:    • VAGINAL MESH REVISION           SUBJECTIVE     Subjective Evaluation    History of Present Illness  Date of onset: 2022  Mechanism of injury: This pt has been referred to OT after having to call 911 on  to help her up after she had slid to the floor and was not able to get up. Pt reports it happens with sliding off her bed as well because she will sit on the edge but then slide down to the ground. This has happened multiple times to where the pt and her family wanted to do something to help this pt gain more strength to ensure more safety. Pt reports more pain at random places all over her body due to diagnosis of Fibromyalgia. This pt would be a good candidate for OT and she is motivated to regain her strength and independence.      Patient Occupation: Retired Quality of life: good    Pain  Current pain ratin  At best pain ratin  At worst pain rating: 10  Location: B knee, B shoulder, glutes  Quality: throbbing  Relieving factors: change in position  Aggravating factors: prolonged positioning and repetitive movement    Social Support  Lives in: one-story house  Lives with: adult children and spouse    Hand dominance: right    Patient Goals  Patient goals for therapy: increased strength, independence with ADLs/IADLs, return to sport/leisure activities, improved balance and decreased pain         The patient living arrangement includes home/apt/condo. Their home accessibility includes 1-story house/ trailer. Their home assistive devices and adaptive equipment includes Cane, Type: Single Point Cane.  Outcome Measure:     QuickDASH: 56.82% impaired with B UE.    9 hole peg test: L: 30.15    R: 25.97      PT G-Codes  Outcome Measure Options:  Quick DASH  Quick DASH Score: 56.82    OBJECTIVE     Objective          Strength/Myotome Testing     Left Wrist/Hand      (2nd hand position)     Trial 1: 49 lbs    Trial 2: 43 lbs    Trial 3: 31 lbs    Average: 41 lbs    Right Wrist/Hand      (2nd hand position)     Trial 1: 57 lbs    Trial 2: 59 lbs    Trial 3: 54 lbs    Average: 56.67 lbs    Additional Strength Details  B hand AROM is WFL.   Pt is able to make a tight fist and complete opposition with B hands.   Pt reported muscle spasms when completing  testing on L hand. Pt changed her position and reported relief.    Ambulation     Comments   This pt completed functional mobility using 2 straight canes when coming into the clinic. Pt then used a wheelchair and was dependent to get to treatment room. Pt would benefit from PT evaluation to address this. Pt reports using Rolator at home most of the time.    Functional Assessment     Comments  Pt reports she is independent with toileting, bathing, grooming, feeding, and dressing. Pt reports only having difficulty with getting in and out of her shower that has a decent step before getting in.      General Comments     Shoulder Comments   B UE AROM WFL  R UE strength functionally 4/5.  L UE strength functionally 3+/5.     Vision assessment: wears corrected lenses/contact.    Cognitive status: oriented to Person, Place, Time and Situation    Sensation: Pt reports mild numbness and tingling in B hands that comes and goes. Pt mainly has neuropathy in her feet.      Midline orientation: Midline    Fine Motor:   Rapid alternating movements of the hands, fingers, and forearms: Able to perform  Sequential finger-to-thumb opposition: Able to perform  Crosses the midline appropriately: Able to perform    Wheelchair Training:  Pt was dependent for wheelchair mobility in the clinic. Pt uses canes and Rolator normally. Pt uses wheelchair when completing more lengthy tasks.    Cerebellar exam: no tremors  noted      Therapy Education/Self Care 28556   Education offered today Educated pt and her daughter on the purpose of OT and the possible goals to work towards in future sessions.   Medbride Code    Ongoing HEP   Will progress as pt makes progress towards her goals.    Timed Minutes        Total Timed Treatment:     0   mins  Total Time of Visit:            29   mins    ASSESSMENT/PLAN     GOALS:  Goals                                          Progress Note due by 10/26/22                                                      Recert due by 12/24/22   STG by: 10/26/22 Comments Date Status   Pt will be independent with daily completion of a HEP to address B UE weakness and IADL routine.      Pt will display improved B UE FMC by completing the 9 hole peg test in 23 seconds or less.      Pt will increase B hand  strength to 65# for improved performance with IADL tasks.            LTG by: 10/26/22      Pt will increase B UE strength to 5/5 to improve ADL/IADL routine.      Pt will display an activity tolerance of 15 minutes with functional use of B UE to improve performance of IADL tasks.      Pt will demonstrate shower transfer safely with min A to decrease caregiver burden.                            Assessment & Plan     Assessment  Impairments: abnormal coordination, abnormal or restricted ROM, activity intolerance, impaired balance, impaired physical strength, lacks appropriate home exercise program, pain with function, safety issue and weight-bearing intolerance  Functional Limitations: carrying objects, lifting, walking, pulling, pushing, uncomfortable because of pain, sitting, standing, stooping, reaching behind back and unable to perform repetitive tasks  Assessment details: Pt has been referred to OT following hospital stay when she had to call 911 after sliding down to the ground and was not able to get herself back up. Pt is diagnosed with Fibromyalgia which causes her mpre frequent pain all over her body.  Pt reports she is independent with her ADL routine but she does have difficulty with getting in and out of the shower due to a decent size step over. Pt displays decreased B hand  scores, but L hand is weaker than R. Pt also had slightly decreased score on L hand compared to R when completing 9 hole peg test. Pt is overall deconditioned and has had multiple situations where she has fallen/slid down to the ground and was not able to get herself up. This pt is now motivated to regain her strength and independence to keep this from continuing to happen. Pt's daughter reports the pt is not able to stand long enough to make herself a sandwich. This pt requires B UE strengthening to help improve her ADL transfers and to complete ADL/IADL tasks at home with more independence. This pt would also benefit from a PT evaluation to address her functional mobility. Pt would benefit from skilled OT services to address these deficits and help this patient regain her strength and independence and decrease caregiver burden.    Barriers to therapy: pain  Prognosis: good    Plan  Planned modality interventions: ultrasound, thermotherapy (paraffin bath), thermotherapy (hydrocollator packs), low level laser therapy, electrical stimulation/Russian stimulation and cryotherapy  Planned therapy interventions: ADL retraining, motor coordination training, balance/weight-bearing training, postural training, strengthening, stretching, therapeutic activities, transfer training, IADL retraining, home exercise program, functional ROM exercises, flexibility and fine motor coordination training  Frequency: 1x week  Duration in weeks: 10  Treatment plan discussed with: patient and family  Plan details: Will focus on B UE strengthening along with standing balance/activity tolerance when completing tasks. Will focus on IADL simulations as well.         SIGNATURE: Cassidy Joe OTR/L, [place] License #: [id number]  Electronically Signed on  9/26/2022    Initial Certification  Certification Period: 9/26/2022 through 12/24/2022  I certify that the therapy services are furnished while this patient is under my care.  The services outlined above are required by this patient, and will be reviewed every 90 days.     PHYSICIAN: Angel Hay DO (NPI: [id number])    Signature: _______________________________________DATE: _________    Please sign and return via fax to [phone].   Thank you so much for letting us work with Jennifer. I appreciate your letting us work with your patients. If you have any questions or concerns, please don't hesitate to contact me.          [address] [address]  [phone]

## 2022-10-03 ENCOUNTER — TELEPHONE (OUTPATIENT)
Dept: PHYSICAL THERAPY | Facility: CLINIC | Age: 82
End: 2022-10-03

## 2022-10-06 ENCOUNTER — TELEPHONE (OUTPATIENT)
Dept: OBSTETRICS AND GYNECOLOGY | Facility: CLINIC | Age: 82
End: 2022-10-06

## 2022-10-06 NOTE — TELEPHONE ENCOUNTER
Pt called states having burning sensation under skin from hips to knees, pt advised to call PCP or go to urgent care, pt states she understands, reviewed with Ayse MORA.

## 2022-10-10 ENCOUNTER — TREATMENT (OUTPATIENT)
Dept: PHYSICAL THERAPY | Facility: CLINIC | Age: 82
End: 2022-10-10

## 2022-10-10 DIAGNOSIS — R29.898 WEAKNESS OF BOTH ARMS: Primary | ICD-10-CM

## 2022-10-10 DIAGNOSIS — R68.89 DECREASED FUNCTIONAL ACTIVITY TOLERANCE: ICD-10-CM

## 2022-10-10 DIAGNOSIS — Z78.9 IMPAIRED INSTRUMENTAL ACTIVITIES OF DAILY LIVING (IADL): ICD-10-CM

## 2022-10-10 PROCEDURE — 97110 THERAPEUTIC EXERCISES: CPT

## 2022-10-10 NOTE — PROGRESS NOTES
Occupational Therapy Treatment Note  [address] [place] [address]    Patient: Jennifer Gomes                                                 Visit Date: 10/10/2022  :     1940    Referring practitioner:    Angel Hay DO  Date of Initial Visit:          Type: THERAPY  Noted: 2022    Patient seen for 2 sessions    Visit Diagnoses:    ICD-10-CM ICD-9-CM   1. Weakness of both arms  R29.898 729.89   2. Decreased functional activity tolerance  R68.89 780.99   3. Impaired instrumental activities of daily living (IADL)  Z78.9 V49.89     SUBJECTIVE     Subjective     PAIN: 6/10 Pt reports her pain is a little better today. Pt was using Rolator with 2 canes just in case. Pt would benefit from PT evaluation for functional mobility. Pt is currently on the waiting list for this with PT. Pt would like to work on how to get up from the floor if she were to fall. Pt reports that is her main reason for calling 911 after a fall because she is not able to get herself up.         OBJECTIVE     Objective       Therapeutic Exercises    [address] Comments   Pt completed B hand  strengthening using light resistance foam cylinder performing 1 set of 20 reps each. Pt then completed B hand tip, 3 point, and lateral pinch using      Pt completed B hand/wrist strengthening in all planes using light resistance flex bar to simulate opening various jars and containers performing 1 set of 10 reps.    Pt completed B UE AROM strengthening using 2# dumbbell for shoulder press, bicep curl, tricep extension, wrist flexion/extension, forearm pronation/supination, and abduction/adduction performing 1 set of 10 reps each. Added to HEP.   Pt completed the arm bike on level 1.5 for 3 minutes focusing on building endurance and B UE strength. Pt reported fatigue after completing but no pain. Pt surprised herself completing 3 minutes on the arm bike.    Pt completed B hand  tip and three point pinch strengthening using red and green moderate resistance clips to  10 cotton balls and place them into a container x 2 sets using each resistance.    Timed Minutes 43     Total Timed Treatment:     43   mins  Total Time of Visit:             43   mins    Therapy Education/Self Care 79834   Education offered today Added B UE AROM exercises to HEP and provided pt with handout to follow along with.    Medbride Code    Ongoing HEP   Added B UE AROM items to HEP. Focused on appropriate weight and proper form when completing.   Timed Minutes             ASSESSMENT/PLAN     GOALS  Goals                                          Progress Note due by 10/26/22                                                      Recert due by 12/24/22   STG by: 10/26/22 Comments Date Status   Pt will be independent with daily completion of a HEP to address B UE weakness and IADL routine. Added B UE AROM exercises to HEP.  Ongoing   Pt will display improved B UE FMC by completing the 9 hole peg test in 23 seconds or less.   Ongoing   Pt will increase B hand  strength to 65# for improved performance with IADL tasks. Addressed with light resistance today.  Ongoing         LTG by: 10/26/22      Pt will increase B UE strength to 5/5 to improve ADL/IADL routine. Added B UE AROM strengthening to HEP. Pt completed the arm bike on level 1.5 for 3 minutes.  Ongoing   Pt will display an activity tolerance of 15 minutes with functional use of B UE to improve performance of IADL tasks. Focused on endurance using the arm bike today. Pt fatigued quickly.  Ongoing   Pt will demonstrate shower transfer safely with min A to decrease caregiver burden. Focused on strengthening to improve ADL transfers.  Ongoing                         Assessment/Plan     ASSESSMENT:   Pt did well for her first OT appointment. Pt did well with form and pace when completing  HEP items for B UE AROM strengthening. Pt is motivated to make progress  towards   her goals and was provided HEP exercises to start working on. Pt requested learning  how to get up from the floor if she were to fall due to not being able to get herself up  in the past.    PLAN:   Will continue to focus on B UE strengthening and how to get up from the ground if she were to fall.     SIGNATURE: ZEESHAN Lowe/L, [place] License #: [id number]  Electronically Signed on 10/10/2022        [address]  [place] [address]  [phone]

## 2022-10-17 ENCOUNTER — TELEPHONE (OUTPATIENT)
Dept: PHYSICAL THERAPY | Facility: CLINIC | Age: 82
End: 2022-10-17

## 2022-10-18 DIAGNOSIS — M54.2 NECK PAIN: ICD-10-CM

## 2022-10-18 RX ORDER — LANSOPRAZOLE 30 MG/1
CAPSULE, DELAYED RELEASE ORAL
Qty: 30 CAPSULE | Refills: 5 | Status: SHIPPED | OUTPATIENT
Start: 2022-10-18

## 2022-10-18 RX ORDER — CYCLOBENZAPRINE HCL 10 MG
TABLET ORAL
Qty: 90 TABLET | Refills: 0 | Status: SHIPPED | OUTPATIENT
Start: 2022-10-18

## 2022-10-18 NOTE — TELEPHONE ENCOUNTER
Lela Camarena called requesting a refill of the below medication which has been pended for you:     Requested Prescriptions     Pending Prescriptions Disp Refills    cyclobenzaprine (FLEXERIL) 10 MG tablet [Pharmacy Med Name: CYCLOBENZAPRINE HYDROCHLORIDE 10MG TABLET] 90 tablet 0     Sig: TAKE 1 TABLET BY MOUTH 3 TIMES DAILY AS NEEDED FOR MUSCLE SPASMS       Last Appointment Date: 8/30/2022  Next Appointment Date: 11/7/2022    Allergies   Allergen Reactions    Ambien [Zolpidem Tartrate]     Codeine     Lyrica [Pregabalin]     Morphine     Requip [Ropinirole Hcl]     Tizanidine      Terrible nightmares

## 2022-10-18 NOTE — TELEPHONE ENCOUNTER
Lela Camarena called requesting a refill of the below medication which has been pended for you:     Requested Prescriptions     Pending Prescriptions Disp Refills    lansoprazole (PREVACID) 30 MG delayed release capsule [Pharmacy Med Name: LANSOPRAZOLE 30MG CAPSULE DELAYED RELEASE] 30 capsule 5     Sig: TAKE 1 CAPSULE DAILY EVERY MORNING       Last Appointment Date: 8/30/2022  Next Appointment Date: 10/18/2022    Allergies   Allergen Reactions    Ambien [Zolpidem Tartrate]     Codeine     Lyrica [Pregabalin]     Morphine     Requip [Ropinirole Hcl]     Tizanidine      Terrible nightmares

## 2022-10-24 ENCOUNTER — TELEPHONE (OUTPATIENT)
Dept: PHYSICAL THERAPY | Facility: CLINIC | Age: 82
End: 2022-10-24

## 2022-11-03 ENCOUNTER — OFFICE VISIT (OUTPATIENT)
Dept: INTERNAL MEDICINE | Age: 82
End: 2022-11-03
Payer: MEDICARE

## 2022-11-03 VITALS
WEIGHT: 240 LBS | HEART RATE: 71 BPM | SYSTOLIC BLOOD PRESSURE: 132 MMHG | TEMPERATURE: 97 F | OXYGEN SATURATION: 94 % | DIASTOLIC BLOOD PRESSURE: 72 MMHG | BODY MASS INDEX: 43.9 KG/M2

## 2022-11-03 DIAGNOSIS — R60.0 BILATERAL LOWER EXTREMITY EDEMA: ICD-10-CM

## 2022-11-03 DIAGNOSIS — Z00.00 WELL ADULT EXAM: ICD-10-CM

## 2022-11-03 LAB
ALBUMIN SERPL-MCNC: 4.2 G/DL (ref 3.5–5.2)
ALP BLD-CCNC: 76 U/L (ref 35–104)
ALT SERPL-CCNC: 8 U/L (ref 5–33)
ANION GAP SERPL CALCULATED.3IONS-SCNC: 10 MMOL/L (ref 7–19)
AST SERPL-CCNC: 13 U/L (ref 5–32)
BASOPHILS ABSOLUTE: 0 K/UL (ref 0–0.2)
BASOPHILS RELATIVE PERCENT: 0.6 % (ref 0–1)
BILIRUB SERPL-MCNC: 0.3 MG/DL (ref 0.2–1.2)
BUN BLDV-MCNC: 12 MG/DL (ref 8–23)
CALCIUM SERPL-MCNC: 9.7 MG/DL (ref 8.8–10.2)
CHLORIDE BLD-SCNC: 104 MMOL/L (ref 98–111)
CHOLESTEROL, TOTAL: 171 MG/DL (ref 160–199)
CO2: 29 MMOL/L (ref 22–29)
CREAT SERPL-MCNC: 0.9 MG/DL (ref 0.5–0.9)
EOSINOPHILS ABSOLUTE: 0.2 K/UL (ref 0–0.6)
EOSINOPHILS RELATIVE PERCENT: 2.8 % (ref 0–5)
GFR SERPL CREATININE-BSD FRML MDRD: >60 ML/MIN/{1.73_M2}
GLUCOSE BLD-MCNC: 110 MG/DL (ref 74–109)
HCT VFR BLD CALC: 37.1 % (ref 37–47)
HDLC SERPL-MCNC: 54 MG/DL (ref 65–121)
HEMOGLOBIN: 11.5 G/DL (ref 12–16)
IMMATURE GRANULOCYTES #: 0 K/UL
LDL CHOLESTEROL CALCULATED: 88 MG/DL
LYMPHOCYTES ABSOLUTE: 1.3 K/UL (ref 1.1–4.5)
LYMPHOCYTES RELATIVE PERCENT: 25 % (ref 20–40)
MCH RBC QN AUTO: 28.4 PG (ref 27–31)
MCHC RBC AUTO-ENTMCNC: 31 G/DL (ref 33–37)
MCV RBC AUTO: 91.6 FL (ref 81–99)
MONOCYTES ABSOLUTE: 0.5 K/UL (ref 0–0.9)
MONOCYTES RELATIVE PERCENT: 9.7 % (ref 0–10)
NEUTROPHILS ABSOLUTE: 3.3 K/UL (ref 1.5–7.5)
NEUTROPHILS RELATIVE PERCENT: 61.7 % (ref 50–65)
PDW BLD-RTO: 13.8 % (ref 11.5–14.5)
PLATELET # BLD: 184 K/UL (ref 130–400)
PMV BLD AUTO: 11 FL (ref 9.4–12.3)
POTASSIUM SERPL-SCNC: 4.6 MMOL/L (ref 3.5–5)
RBC # BLD: 4.05 M/UL (ref 4.2–5.4)
SODIUM BLD-SCNC: 143 MMOL/L (ref 136–145)
TOTAL PROTEIN: 6.3 G/DL (ref 6.6–8.7)
TRIGL SERPL-MCNC: 147 MG/DL (ref 0–149)
TSH SERPL DL<=0.05 MIU/L-ACNC: 3.3 UIU/ML (ref 0.27–4.2)
VITAMIN D 25-HYDROXY: 78.1 NG/ML
WBC # BLD: 5.3 K/UL (ref 4.8–10.8)

## 2022-11-03 PROCEDURE — 1090F PRES/ABSN URINE INCON ASSESS: CPT | Performed by: NURSE PRACTITIONER

## 2022-11-03 PROCEDURE — G8484 FLU IMMUNIZE NO ADMIN: HCPCS | Performed by: NURSE PRACTITIONER

## 2022-11-03 PROCEDURE — 3078F DIAST BP <80 MM HG: CPT | Performed by: NURSE PRACTITIONER

## 2022-11-03 PROCEDURE — G0008 ADMIN INFLUENZA VIRUS VAC: HCPCS | Performed by: NURSE PRACTITIONER

## 2022-11-03 PROCEDURE — 1123F ACP DISCUSS/DSCN MKR DOCD: CPT | Performed by: NURSE PRACTITIONER

## 2022-11-03 PROCEDURE — 99212 OFFICE O/P EST SF 10 MIN: CPT | Performed by: NURSE PRACTITIONER

## 2022-11-03 PROCEDURE — 90694 VACC AIIV4 NO PRSRV 0.5ML IM: CPT | Performed by: NURSE PRACTITIONER

## 2022-11-03 PROCEDURE — G8417 CALC BMI ABV UP PARAM F/U: HCPCS | Performed by: NURSE PRACTITIONER

## 2022-11-03 PROCEDURE — 3074F SYST BP LT 130 MM HG: CPT | Performed by: NURSE PRACTITIONER

## 2022-11-03 PROCEDURE — G8427 DOCREV CUR MEDS BY ELIG CLIN: HCPCS | Performed by: NURSE PRACTITIONER

## 2022-11-03 PROCEDURE — 1036F TOBACCO NON-USER: CPT | Performed by: NURSE PRACTITIONER

## 2022-11-03 PROCEDURE — G8400 PT W/DXA NO RESULTS DOC: HCPCS | Performed by: NURSE PRACTITIONER

## 2022-11-03 ASSESSMENT — ENCOUNTER SYMPTOMS
EYE ITCHING: 0
DIARRHEA: 0
VOMITING: 0
NAUSEA: 0
TROUBLE SWALLOWING: 0
STRIDOR: 0
CONSTIPATION: 0
COUGH: 0
SORE THROAT: 0
EYE DISCHARGE: 0
ABDOMINAL DISTENTION: 0
WHEEZING: 0
ABDOMINAL PAIN: 0
BLOOD IN STOOL: 0
SHORTNESS OF BREATH: 0
CHOKING: 0
COLOR CHANGE: 0

## 2022-11-03 NOTE — PROGRESS NOTES
[facility] PHYSICIAN SERVICES  [facility] INTERNAL MEDICINE  [address] [facility] [address] [address]  Dept: [phone]  Dept Fax: [phone]: [phone]    oRcío Jenkins (:  1940) is a [de-identified] y.o. female,Established patient  with green, here for evaluation of the following chief complaint(s): Leg Swelling      Rocío Jenkins is a [de-identified] y.o. female who presents today for her medical conditions/complaints as noted below. Rocío Jenkins is c/maame Leg Swelling        HPI:     Chief Complaint   Patient presents with    Leg Swelling     HPI   Edema recheck   better with bumex 1 mg   Her feet are not swollen at all today she feels really good.   She just came to get her labs so she wanted to come by and let me see her feet she has an office of appointment next week    Past Medical History:   Diagnosis Date    Adenocarcinoma of endometrium, stage 1 ([address] 2017    Anemia     Arthritis     CAD (coronary artery disease)     \"Stiff Valve\"    Chronic kidney disease     Depression     Fibrocystic breast disease     17 biopsy - benign FCBD w/microcalcifications - [facility]     GERD (gastroesophageal reflux disease)     H/O vaginal bleeding     managed Dr Marcus Willis    Hypertension     Hypothyroidism (acquired)     Hypothyroidism (acquired)     Mixed hyperlipidemia     Obesity     Osteoarthritis     Pain of both hip joints 2016    Situational anxiety     Sleep apnea     Vitamin D deficiency       Past Surgical History:   Procedure Laterality Date    BREAST BIOPSY Right     BREAST SURGERY Left     Biopsy, Benign    CYST REMOVAL Left     Shoulder cyst, benign    DILATION AND CURETTAGE OF UTERUS      EYE SURGERY  2017    HYSTERECTOMY (CERVIX STATUS UNKNOWN)      Removed uterus and cervix due to cancer,     LEG SURGERY Left     Tib/Fib ORIF    TONSILLECTOMY         Vitals 11/3/2022 2022 5/3/2022 2022 10/18/2021    SYSTOLIC [phone] 724   DIASTOLIC 72 78 70 80 74 76 Site - - - - - -   Position - - - - - -   Pulse 71 80 72 90 - -   Temp 97 - - - - -   Resp - - - - - -   SpO2 94 96 99 99 - -   Weight 240 lb - 240 lb 230 lb - -   Height - - - 5' 2\" - -   Body mass index - - - 42.06 kg/m2 - -   Some recent data might be hidden       Family History   Problem Relation Age of Onset    Cancer Mother        Social History     Tobacco Use    Smoking status: Never    Smokeless tobacco: Never   Substance Use Topics    Alcohol use: Yes     Comment: occassionally      Current Outpatient Medications   Medication Sig Dispense Refill    lansoprazole (PREVACID) 30 MG delayed release capsule TAKE 1 CAPSULE DAILY EVERY MORNING 30 capsule 5    cyclobenzaprine (FLEXERIL) 10 MG tablet TAKE 1 TABLET BY MOUTH 3 TIMES DAILY AS NEEDED FOR MUSCLE SPASMS 90 tablet 0    vitamin D (ERGOCALCIFEROL) 1.25 MG (43611 UT) CAPS capsule TAKE 1 CAPSULE BY MOUTH ONCE A WEEK 12 capsule 2    levothyroxine (SYNTHROID) 50 MCG tablet TAKE 1 TABLET DAILY 90 tablet 3    atorvastatin (LIPITOR) 40 MG tablet TAKE 1 TABLET DAILY AS DIRECTED 90 tablet 1    diclofenac (VOLTAREN) 75 MG EC tablet TAKE 1 TABLET BY MOUTH 2 TIMES DAILY 60 tablet 1    carvedilol (COREG) 12.5 MG tablet TAKE 1 TABLET TWICE A DAY 60 tablet 5    ipratropium (ATROVENT) 0.06 % nasal spray by Each Nostril route 2 times daily      SUMAtriptan (IMITREX) 50 MG tablet TAKE 1 TABLET BY MOUTH TWICE DAILY AS NEEDED FOR MIGRAINE. MAX 2 TABLETS DAILY. 18 tablet 10    bumetanide (BUMEX) 1 MG tablet TAKE 1 TABLET DAILY 90 tablet 3    sertraline (ZOLOFT) 25 MG tablet       ALPRAZolam (XANAX) 0.5 MG tablet       venlafaxine (EFFEXOR) 25 MG tablet       butalbital-acetaminophen-caffeine (FIORICET, ESGIC) -40 MG per tablet       donepezil (ARICEPT) 10 MG tablet TAKE 1 TABLET BY MOUTH NIGHTLY 30 tablet 5     No current facility-administered medications for this visit.      Allergies   Allergen Reactions    Ambien [Zolpidem Tartrate]     Codeine     Lyrica [Pregabalin] Morphine     Requip [Ropinirole Hcl]     Tizanidine      Terrible nightmares         Health Maintenance   Topic Date Due    DTaP/Tdap/Td vaccine (1 - Tdap) Never done    Breast cancer screen  08/30/2021    Annual Wellness Visit (AWV)  07/14/2022    Flu vaccine (1) 08/01/2022    Shingles vaccine (3 of 3) 05/03/2023 (Originally 12/5/2019)    COVID-19 Vaccine (3 - Booster for Moderna series) 06/27/2023 (Originally 5/31/2021)    Lipids  08/20/2023    Depression Screen  08/30/2023    DEXA (modify frequency per FRAX score)  Completed    Pneumococcal 65+ years Vaccine  Completed    Hepatitis A vaccine  Aged Out    Hib vaccine  Aged Out    Meningococcal (ACWY) vaccine  Aged Out       No results found for: LABA1C  No results found for: PSA, PSADIA  TSH   Date Value Ref Range Status   05/03/2022 3.180 0.270 - 4.200 uIU/mL Final   ]  Lab Results   Component Value Date     (H) 05/03/2022    K 4.7 05/03/2022     05/03/2022    CO2 22 05/03/2022    BUN 14 05/03/2022    CREATININE 0.9 05/03/2022    GLUCOSE 103 05/03/2022    CALCIUM 9.7 05/03/2022    PROT 6.1 (L) 05/03/2022    LABALBU 4.1 05/03/2022    BILITOT <0.2 05/03/2022    ALKPHOS 70 05/03/2022    AST 13 05/03/2022    ALT 9 05/03/2022    LABGLOM >60 05/03/2022    GFRAA >59 05/03/2022     Lab Results   Component Value Date    CHOL 215 (H) 05/03/2022    CHOL 194 04/02/2021    CHOL 176 06/26/2020     Lab Results   Component Value Date    TRIG 186 (H) 05/03/2022    TRIG 160 (H) 04/02/2021    TRIG 105 06/26/2020     Lab Results   Component Value Date    HDL 49 (L) 05/03/2022    HDL 57 (L) 04/02/2021    HDL 60 (L) 06/26/2020     Lab Results   Component Value Date    LDLCALC 129 05/03/2022    LDLCALC 105 04/02/2021    LDLCALC 95 06/26/2020     Lab Results   Component Value Date/Time     05/03/2022 11:55 AM    K 4.7 05/03/2022 11:55 AM     05/03/2022 11:55 AM    CO2 22 05/03/2022 11:55 AM    BUN 14 05/03/2022 11:55 AM    CREATININE 0.9 05/03/2022 11:55 AM GLUCOSE 103 05/03/2022 11:55 AM    CALCIUM 9.7 05/03/2022 11:55 AM      Lab Results   Component Value Date    WBC 5.0 05/03/2022    HGB 12.7 05/03/2022    HCT 38.9 05/03/2022    MCV 94.4 05/03/2022     05/03/2022    LYMPHOPCT 30.6 05/03/2022    RBC 4.12 (L) 05/03/2022    MCH 30.8 05/03/2022    MCHC 32.6 (L) 05/03/2022    RDW 12.9 05/03/2022     Lab Results   Component Value Date    VITD25 60.0 05/03/2022     Labs reviewed from 6/2022    Subjective:      Review of Systems   Constitutional:  Negative for fatigue, fever and unexpected weight change. HENT:  Negative for ear discharge, ear pain, mouth sores, sore throat and trouble swallowing. Eyes:  Negative for discharge, itching and visual disturbance. Respiratory:  Negative for cough, choking, shortness of breath, wheezing and stridor. Cardiovascular:  Positive for leg swelling. Negative for chest pain and palpitations. Gastrointestinal:  Negative for abdominal distention, abdominal pain, blood in stool, constipation, diarrhea, nausea and vomiting. Endocrine: Negative for cold intolerance, polydipsia and polyuria. Genitourinary:  Negative for difficulty urinating, dysuria, frequency and urgency. Musculoskeletal:  Negative for arthralgias and gait problem. Skin:  Negative for color change and rash. Allergic/Immunologic: Negative for food allergies and immunocompromised state. Neurological:  Negative for dizziness, tremors, syncope, speech difficulty, weakness and headaches. Hematological:  Negative for adenopathy. Does not bruise/bleed easily. Psychiatric/Behavioral:  Negative for confusion and hallucinations. Objective:     Physical Exam  Constitutional:       General: She is not in acute distress. Appearance: She is well-developed. HENT:      Head: Normocephalic and atraumatic. Eyes:      General: No scleral icterus. Right eye: No discharge. Left eye: No discharge.       Pupils: Pupils are equal, round, and reactive to light. Neck:      Thyroid: No thyromegaly. Vascular: No JVD. Cardiovascular:      Rate and Rhythm: Normal rate and regular rhythm. Heart sounds: Normal heart sounds. No murmur heard. Pulmonary:      Effort: Pulmonary effort is normal. No respiratory distress. Breath sounds: Normal breath sounds. No wheezing or rales. Abdominal:      General: Bowel sounds are normal. There is no distension. Palpations: Abdomen is soft. There is no mass. Tenderness: There is no abdominal tenderness. There is no guarding or rebound. Musculoskeletal:         General: No tenderness. Normal range of motion. Cervical back: Normal range of motion and neck supple. Right lower leg: Edema present. Left lower leg: Edema present. Skin:     General: Skin is warm and dry. Findings: No erythema or rash. Neurological:      Mental Status: She is alert and oriented to person, place, and time. Cranial Nerves: No cranial nerve deficit. Coordination: Coordination normal.      Deep Tendon Reflexes: Reflexes are normal and symmetric. Reflexes normal.   Psychiatric:         Mood and Affect: Mood is not depressed. Behavior: Behavior normal.         Thought Content: Thought content normal.         Judgment: Judgment normal.     /72   Pulse 71   Temp 97 °F (36.1 °C)   Wt 240 lb (108.9 kg)   SpO2 94%   BMI 43.90 kg/m²           Assessment:      Problem List       Bilateral lower extremity edema     Much improved with just a few days of extra Bumex          Relevant Medications    bumetanide (BUMEX) 1 MG tablet    carvedilol (COREG) 12.5 MG tablet    atorvastatin (LIPITOR) 40 MG tablet       Plan:        Patient given educational materials - see patient instructions. Discussed use, benefit, and side effects of prescribed medications. Allpatient questions answered. Pt voiced understanding. Reviewed health maintenance.   Instructed to continue current medications, diet and exercise. Patient agreed with treatment plan. Follow up as directed. MEDICATIONS:  No orders of the defined types were placed in this encounter. ORDERS:  No orders of the defined types were placed in this encounter. Follow-up:  No follow-ups on file. PATIENT INSTRUCTIONS:  Patient Instructions   1. Peripheral edema keep follow-up on Monday we will evaluate her labs etc.  Electronically signed by TAMIA Cline on 11/3/2022 at 12:06 PM    @    Hattie/transcription disclaimer:  Much of this encounter note is electronic transcription/translation of spoken language to printed texts. The electronic translation of spoken language may be erroneous, or at times,nonsensical words or phrases may be inadvertently transcribed.   Although I have reviewed the note for such errors, some may still exist.

## 2022-11-03 NOTE — PROGRESS NOTES
After obtaining consent, and per orders of Dr. Scout Wynne , influenza vaccine given in Left deltoid by Jeanne Caraballo . Patient instructed to remain in clinic for 20 minutes afterwards, and to report any adverse reaction to me immediately. Patient given influenza education materials. Patient did not have any questions for the Medical Assistant at this time.

## 2022-11-21 RX ORDER — DICLOFENAC SODIUM 75 MG/1
75 TABLET, DELAYED RELEASE ORAL 2 TIMES DAILY
Qty: 60 TABLET | Refills: 1 | Status: SHIPPED | OUTPATIENT
Start: 2022-11-21

## 2022-11-21 NOTE — TELEPHONE ENCOUNTER
Wai Lauren called requesting a refill of the below medication which has been pended for you:     Requested Prescriptions     Pending Prescriptions Disp Refills    diclofenac (VOLTAREN) 75 MG EC tablet [Pharmacy Med Name: DICLOFENAC SODIUM DR 75MG TABLET DELAYED RELEASE] 60 tablet 1     Sig: TAKE 1 TABLET BY MOUTH 2 TIMES DAILY       Last Appointment Date: 11/3/2022  Next Appointment Date: Visit date not found    Allergies   Allergen Reactions    Ambien [Zolpidem Tartrate]     Codeine     Lyrica [Pregabalin]     Morphine     Requip [Ropinirole Hcl]     Tizanidine      Terrible nightmares

## 2022-11-23 ENCOUNTER — OFFICE VISIT (OUTPATIENT)
Dept: OBSTETRICS AND GYNECOLOGY | Facility: CLINIC | Age: 82
End: 2022-11-23

## 2022-11-23 VITALS
WEIGHT: 202 LBS | BODY MASS INDEX: 37.17 KG/M2 | HEIGHT: 62 IN | DIASTOLIC BLOOD PRESSURE: 78 MMHG | SYSTOLIC BLOOD PRESSURE: 116 MMHG

## 2022-11-23 DIAGNOSIS — Z78.0 POSTMENOPAUSAL: ICD-10-CM

## 2022-11-23 DIAGNOSIS — F41.9 ANXIETY: Primary | ICD-10-CM

## 2022-11-23 DIAGNOSIS — F41.1 GAD (GENERALIZED ANXIETY DISORDER): ICD-10-CM

## 2022-11-23 DIAGNOSIS — N32.81 OAB (OVERACTIVE BLADDER): ICD-10-CM

## 2022-11-23 LAB
BACTERIA: ABNORMAL /HPF
BILIRUBIN URINE: NEGATIVE
BLOOD, URINE: ABNORMAL
CLARITY: ABNORMAL
COLOR: ABNORMAL
EPITHELIAL CELLS, UA: ABNORMAL /HPF
GLUCOSE URINE: NEGATIVE MG/DL
KETONES, URINE: NEGATIVE MG/DL
LEUKOCYTE ESTERASE, URINE: ABNORMAL
NITRITE, URINE: NEGATIVE
PH UA: 5.5 (ref 5–8)
PROTEIN UA: 30 MG/DL
RBC UA: ABNORMAL /HPF (ref 0–2)
SPECIFIC GRAVITY UA: 1.02 (ref 1–1.03)
UROBILINOGEN, URINE: 0.2 E.U./DL
WBC UA: ABNORMAL /HPF (ref 0–5)

## 2022-11-23 PROCEDURE — 99213 OFFICE O/P EST LOW 20 MIN: CPT | Performed by: OBSTETRICS & GYNECOLOGY

## 2022-11-23 RX ORDER — OXYCODONE HYDROCHLORIDE 15 MG/1
TABLET ORAL EVERY 6 HOURS PRN
COMMUNITY
Start: 2022-11-21

## 2022-11-23 RX ORDER — IPRATROPIUM BROMIDE 42 UG/1
2 SPRAY, METERED NASAL 2 TIMES DAILY
Qty: 15 ML | Refills: 3 | Status: SHIPPED | OUTPATIENT
Start: 2022-11-23

## 2022-11-23 RX ORDER — CYCLOBENZAPRINE HCL 10 MG
TABLET ORAL 2 TIMES DAILY PRN
COMMUNITY
Start: 2022-11-17

## 2022-11-23 RX ORDER — DONEPEZIL HYDROCHLORIDE 10 MG/1
10 TABLET, FILM COATED ORAL NIGHTLY
COMMUNITY
Start: 2022-10-18

## 2022-11-23 RX ORDER — VENLAFAXINE 25 MG/1
25 TABLET ORAL DAILY
Qty: 90 TABLET | Refills: 1 | Status: SHIPPED | OUTPATIENT
Start: 2022-11-23

## 2022-11-23 RX ORDER — ALPRAZOLAM 1 MG/1
1 TABLET ORAL 3 TIMES DAILY PRN
Qty: 90 TABLET | Refills: 2 | Status: SHIPPED | OUTPATIENT
Start: 2022-11-23 | End: 2023-02-28 | Stop reason: SDUPTHER

## 2022-11-23 RX ORDER — CARVEDILOL 12.5 MG/1
TABLET ORAL
Qty: 180 TABLET | Refills: 1 | Status: SHIPPED | OUTPATIENT
Start: 2022-11-23

## 2022-11-23 RX ORDER — SERTRALINE HYDROCHLORIDE 25 MG/1
25 TABLET, FILM COATED ORAL DAILY
Qty: 90 TABLET | Refills: 1 | Status: SHIPPED | OUTPATIENT
Start: 2022-11-23

## 2022-11-23 RX ORDER — ALPRAZOLAM 1 MG/1
1 TABLET ORAL 3 TIMES DAILY PRN
Qty: 90 TABLET | Refills: 2
Start: 2022-11-23 | End: 2022-11-23 | Stop reason: SDUPTHER

## 2022-11-23 RX ORDER — IPRATROPIUM BROMIDE 42 UG/1
2 SPRAY, METERED NASAL
COMMUNITY
Start: 2022-11-23

## 2022-11-23 RX ORDER — MIRABEGRON 25 MG/1
50 TABLET, FILM COATED, EXTENDED RELEASE ORAL DAILY
Qty: 180 TABLET | Refills: 1 | Status: SHIPPED | OUTPATIENT
Start: 2022-11-23

## 2022-11-23 RX ORDER — MIRABEGRON 25 MG/1
50 TABLET, FILM COATED, EXTENDED RELEASE ORAL DAILY
COMMUNITY
Start: 2022-10-18 | End: 2022-11-23 | Stop reason: SDUPTHER

## 2022-11-23 NOTE — PROGRESS NOTES
"Subjective   Chief Complaint   Patient presents with   • Anxiety     Pt here today for follow up on xanax. Pt voices she is doing well. Pt voices no concerns.      Jennifer Gomes is a 82 y.o. year old .  No LMP recorded (lmp unknown). Patient has had a hysterectomy.  She presents to be seen for follow-up of anxiety.  She reports that her anxiety is \"about the same\" lately.  When specifically asked, she reports that her daughter is still living with her and is doing well.    Depression also well-controlled with zoloft and effexor -patient states that she is not currently depressed.  Patient is pleased with her medications, although she is somewhat stressed that her daughter has had to move into her house to take care of Jennifer and her .    The following portions of the patient's history were reviewed and updated as appropriate:current medications and allergies    Social History    Tobacco Use      Smoking status: Never      Smokeless tobacco: Never    Review of Systems   Constitutional: Negative for activity change and unexpected weight change.   Respiratory: Negative for shortness of breath.    Cardiovascular: Negative for chest pain.   Gastrointestinal: Positive for abdominal pain (intermittent, every day at some point, takes gas-x) and constipation.        + fecal incontinence   Genitourinary: Negative for difficulty urinating and enuresis (better with myrbetriq).   Musculoskeletal: Positive for arthralgias and back pain.        Patient has a lot of chronic pain, which limits her mobility   Psychiatric/Behavioral: Positive for dysphoric mood (stable on zoloft and effexor.  Not currently feeling depressed) and sleep disturbance (significant.  Pt takes 3 mg of xanax most nights, divided into two doses, in order to sleep). The patient is nervous/anxious (pt takes xanax prn during the day, but reports that is rare).          Objective   /78   Ht 157.5 cm (62\")   Wt 91.6 kg (202 lb)   LMP  (LMP " Unknown)   BMI 36.95 kg/m²     Physical Exam  Vitals and nursing note reviewed.   Constitutional:       General: She is not in acute distress.     Appearance: She is well-developed. She is obese.   HENT:      Head: Normocephalic and atraumatic.   Neck:      Thyroid: No thyromegaly.   Pulmonary:      Effort: Pulmonary effort is normal.   Abdominal:      General: There is no distension.      Palpations: Abdomen is soft.      Tenderness: There is no abdominal tenderness.   Musculoskeletal:      Cervical back: Normal range of motion.      Comments: Patient in wheelchair today   Skin:     General: Skin is warm and dry.      Comments: Skin on legs much healthier now, although still with discoloration from poor circulation.  Skin intact   Neurological:      Mental Status: She is alert and oriented to person, place, and time.   Psychiatric:         Behavior: Behavior normal.         Judgment: Judgment normal.         Lab Review   No data reviewed    Imaging   No data reviewed     Assessment & Plan    Diagnoses and all orders for this visit:  1. Anxiety: Patient sometimes takes all 3 mg of Xanax every evening to get sleep. Cautioned patient to take medication as prescribed.  We have again discussed weaning and I have remained firm that I will not increase amount she is being given.  Patient reports she cannot sleep due to chronic pain.  -     ALPRAZolam (XANAX) 1 MG tablet; Take 1 tablet by mouth 3 (Three) Times a Day As Needed for Anxiety.  Dispense: 90 tablet; Refill: 2    2. Moderate episode of recurrent major depressive disorder (HCC): currently well-controlled    3. Morbidly obese (HCC): Patient unable to exercise due to difficulty ambulating    4. OAB: Patient reports good symptom control with Myrbetriq, but she is currently out and waiting to get more from [facility].  Samples given.    5. Primary insomnia  -     ALPRAZolam (XANAX) 1 MG tablet; Take 1 tablet by mouth 3 (Three) Times a Day As Needed for Anxiety.   Dispense: 90 tablet; Refill: 2    6. Adenocarcinoma of endometrium (HCC)  Comments:  s/p hysterectomy wtih Numnum in 2017    Patient has not had a mammogram in several years, and not had a bone density test since prior to that.  I have tried to talk her into both screening exams, but she says she is not worried about breast cancer or bone density.  Patient reports to be doing frequent SBE.    This note was electronically signed.    Shasta Madrigal MD  November 23, 2022  14:21 CST    Total time spent today with Jennifer  was 20-29 minutes (level 3).  Greater than 50% of the time was spent coordinating care, answering her questions and counseling regarding pathophysiology of her presenting problem along with plans for any diagnositc work-up and treatment.

## 2022-11-25 LAB — URINE CULTURE, ROUTINE: NORMAL

## 2022-11-28 ENCOUNTER — OFFICE VISIT (OUTPATIENT)
Dept: INTERNAL MEDICINE | Age: 82
End: 2022-11-28
Payer: MEDICARE

## 2022-11-28 VITALS — OXYGEN SATURATION: 96 % | DIASTOLIC BLOOD PRESSURE: 64 MMHG | SYSTOLIC BLOOD PRESSURE: 122 MMHG | HEART RATE: 64 BPM

## 2022-11-28 DIAGNOSIS — R41.3 MEMORY LOSS: ICD-10-CM

## 2022-11-28 DIAGNOSIS — E78.2 MIXED HYPERLIPIDEMIA: ICD-10-CM

## 2022-11-28 DIAGNOSIS — R20.8 RECTAL BURNING: Primary | ICD-10-CM

## 2022-11-28 DIAGNOSIS — Z12.31 ENCOUNTER FOR SCREENING MAMMOGRAM FOR BREAST CANCER: ICD-10-CM

## 2022-11-28 DIAGNOSIS — G89.4 CHRONIC PAIN SYNDROME: ICD-10-CM

## 2022-11-28 DIAGNOSIS — E55.9 VITAMIN D DEFICIENCY: ICD-10-CM

## 2022-11-28 DIAGNOSIS — Z78.0 POSTMENOPAUSAL: ICD-10-CM

## 2022-11-28 DIAGNOSIS — I10 PRIMARY HYPERTENSION: ICD-10-CM

## 2022-11-28 DIAGNOSIS — R60.9 CHRONIC EDEMA: ICD-10-CM

## 2022-11-28 DIAGNOSIS — R26.81 UNSTEADY GAIT: ICD-10-CM

## 2022-11-28 PROCEDURE — 1090F PRES/ABSN URINE INCON ASSESS: CPT | Performed by: NURSE PRACTITIONER

## 2022-11-28 PROCEDURE — 1036F TOBACCO NON-USER: CPT | Performed by: NURSE PRACTITIONER

## 2022-11-28 PROCEDURE — G8417 CALC BMI ABV UP PARAM F/U: HCPCS | Performed by: NURSE PRACTITIONER

## 2022-11-28 PROCEDURE — 3078F DIAST BP <80 MM HG: CPT | Performed by: NURSE PRACTITIONER

## 2022-11-28 PROCEDURE — 1123F ACP DISCUSS/DSCN MKR DOCD: CPT | Performed by: NURSE PRACTITIONER

## 2022-11-28 PROCEDURE — 3074F SYST BP LT 130 MM HG: CPT | Performed by: NURSE PRACTITIONER

## 2022-11-28 PROCEDURE — G8400 PT W/DXA NO RESULTS DOC: HCPCS | Performed by: NURSE PRACTITIONER

## 2022-11-28 PROCEDURE — G8427 DOCREV CUR MEDS BY ELIG CLIN: HCPCS | Performed by: NURSE PRACTITIONER

## 2022-11-28 PROCEDURE — G8484 FLU IMMUNIZE NO ADMIN: HCPCS | Performed by: NURSE PRACTITIONER

## 2022-11-28 PROCEDURE — 99214 OFFICE O/P EST MOD 30 MIN: CPT | Performed by: NURSE PRACTITIONER

## 2022-11-28 ASSESSMENT — ENCOUNTER SYMPTOMS
CONSTIPATION: 0
EYE ITCHING: 0
SHORTNESS OF BREATH: 0
VOMITING: 0
ABDOMINAL DISTENTION: 0
CHOKING: 0
COUGH: 0
TROUBLE SWALLOWING: 0
STRIDOR: 0
DIARRHEA: 0
WHEEZING: 0
NAUSEA: 0
BACK PAIN: 1
COLOR CHANGE: 0
ABDOMINAL PAIN: 0
BLOOD IN STOOL: 0
EYE DISCHARGE: 0
SORE THROAT: 0

## 2022-11-28 NOTE — PATIENT INSTRUCTIONS
Anus burning   have vaginal exam  with dr Vane Santos urinating;  have  vaginal exam with Dr Benard Hamman  Hypertension The current medical regimen is effective;  continue present plan and medications. 4. Postmenopausal   bone density   5. Chronic edema;  stable with current meds   6.   Memory loss;  stable with aricept

## 2022-11-28 NOTE — ASSESSMENT & PLAN NOTE
She is wanting more steroid injections explained to her that is not something that she can do especially since she has not had a DEXA scan in 5 years

## 2022-11-28 NOTE — PROGRESS NOTES
[facility] PHYSICIAN SERVICES  [facility] INTERNAL MEDICINE  [address] [facility] [address]  [facility] [place] [address]  Dept: [phone]  Dept Fax: [phone]: [phone]    Moni Micthell (:  1940) is a [de-identified] y.o. female,Established patient  with green , here for evaluation of the following chief complaint(s): 3 Month Follow-Up (Anus burning all the time,and rt leg swollen and very red)      Moni Mitchell is a [de-identified] y.o. female who presents today for her medical conditions/complaints as noted below. Moni Mitchell is c/maame 3 Month Follow-Up (Anus burning all the time,and rt leg swollen and very red)        HPI:     Chief Complaint   Patient presents with    3 Month Follow-Up     Anus burning all the time,and rt leg swollen and very red     HPI    Anus is burning and has been for a year;  it aches and burns;  ti does go away when she is lying down; and even then not completely  ; she feels her rectum is her colon prolapsed in vaginal area and rectal area;  she refuses exam today    2. Chronic pain syndrome; she had good relief with medrol dose jeff;  she has not had dexa in 5 years;  she is wanting another steroid;  I told her not until she has her dexa;   3. Postmenopausal ;  due for BMD   4. Hypertension on carvedilol 12.5 twice daily Bumex 1 mg daily  5.  Memory loss; she is stable won aricept 10 mg daily    #6 chronic edema with chronic venous stasis bilateral but worse on the right than the left  Past Medical History:   Diagnosis Date    Adenocarcinoma of endometrium, stage 1 ([facility] [address] 2017    Anemia     Arthritis     CAD (coronary artery disease)     \"Stiff Valve\"    Chronic kidney disease     Depression     Fibrocystic breast disease     17 biopsy - benign FCBD w/microcalcifications - [facility]     GERD (gastroesophageal reflux disease)     H/O vaginal bleeding     managed Dr Elyse Jackman    Hypertension     Hypothyroidism (acquired)     Hypothyroidism (acquired)     Mixed hyperlipidemia     Obesity Osteoarthritis     Pain of both hip joints 1/18/2016    Situational anxiety     Sleep apnea     Vitamin D deficiency       Past Surgical History:   Procedure Laterality Date    BREAST BIOPSY Right     BREAST SURGERY Left     Biopsy, Benign    CYST REMOVAL Left     Shoulder cyst, benign    DILATION AND CURETTAGE OF UTERUS      EYE SURGERY  03/2017    HYSTERECTOMY (CERVIX STATUS UNKNOWN)      Removed uterus and cervix due to cancer, 12/17    LEG SURGERY Left     Tib/Fib ORIF    TONSILLECTOMY         Vitals 11/28/2022 11/3/2022 8/30/2022 5/3/2022 2/1/2022 17/43/5806   SYSTOLIC 018 265 130 [phone]   DIASTOLIC 64 72 78 70 80 74   Site - - - - - -   Position - - - - - -   Pulse 64 71 80 72 90 -   Temp - 97 - - - -   Resp - - - - - -   SpO2 96 94 96 99 99 -   Weight - 240 lb - 240 lb 230 lb -   Height - - - - 5' 2\" -   Body mass index - - - - 42.06 kg/m2 -   Some recent data might be hidden       Family History   Problem Relation Age of Onset    Cancer Mother        Social History     Tobacco Use    Smoking status: Never    Smokeless tobacco: Never   Substance Use Topics    Alcohol use: Yes     Comment: occassionally      Current Outpatient Medications   Medication Sig Dispense Refill    MYRBETRIQ 25 MG TB24 Take 2 tablets by mouth daily 180 tablet 1    ipratropium (ATROVENT) 0.06 % nasal spray 2 sprays by Each Nostril route 2 times daily 15 mL 3    venlafaxine (EFFEXOR) 25 MG tablet Take 1 tablet by mouth Daily 90 tablet 1    sertraline (ZOLOFT) 25 MG tablet Take 1 tablet by mouth daily 90 tablet 1    carvedilol (COREG) 12.5 MG tablet TAKE 1 TABLET TWICE A  tablet 1    diclofenac (VOLTAREN) 75 MG EC tablet TAKE 1 TABLET BY MOUTH 2 TIMES DAILY 60 tablet 1    lansoprazole (PREVACID) 30 MG delayed release capsule TAKE 1 CAPSULE DAILY EVERY MORNING 30 capsule 5    cyclobenzaprine (FLEXERIL) 10 MG tablet TAKE 1 TABLET BY MOUTH 3 TIMES DAILY AS NEEDED FOR MUSCLE SPASMS 90 tablet 0    vitamin D (ERGOCALCIFEROL) 1.25 MG (66829 UT) CAPS capsule TAKE 1 CAPSULE BY MOUTH ONCE A WEEK 12 capsule 2    levothyroxine (SYNTHROID) 50 MCG tablet TAKE 1 TABLET DAILY 90 tablet 3    donepezil (ARICEPT) 10 MG tablet TAKE 1 TABLET BY MOUTH NIGHTLY 30 tablet 5    atorvastatin (LIPITOR) 40 MG tablet TAKE 1 TABLET DAILY AS DIRECTED 90 tablet 1    SUMAtriptan (IMITREX) 50 MG tablet TAKE 1 TABLET BY MOUTH TWICE DAILY AS NEEDED FOR MIGRAINE. MAX 2 TABLETS DAILY. 18 tablet 10    bumetanide (BUMEX) 1 MG tablet TAKE 1 TABLET DAILY 90 tablet 3    ALPRAZolam (XANAX) 0.5 MG tablet       butalbital-acetaminophen-caffeine (FIORICET, ESGIC) -40 MG per tablet        No current facility-administered medications for this visit.      Allergies   Allergen Reactions    Ambien [Zolpidem Tartrate]     Codeine     Lyrica [Pregabalin]     Morphine     Requip [Ropinirole Hcl]     Tizanidine      Terrible nightmares         Health Maintenance   Topic Date Due    Breast cancer screen  08/30/2021    Annual Wellness Visit (AWV)  07/14/2022    DTaP/Tdap/Td vaccine (1 - Tdap) 12/19/2022 (Originally 9/19/1959)    Shingles vaccine (3 of 3) 05/03/2023 (Originally 12/5/2019)    COVID-19 Vaccine (3 - Booster for Nash Daubs series) 06/27/2023 (Originally 5/31/2021)    Depression Screen  08/30/2023    Lipids  11/03/2023    DEXA (modify frequency per FRAX score)  Completed    Flu vaccine  Completed    Pneumococcal 65+ years Vaccine  Completed    Hepatitis A vaccine  Aged Out    Hib vaccine  Aged Out    Meningococcal (ACWY) vaccine  Aged Out       No results found for: LABA1C  No results found for: PSA, PSADIA  TSH   Date Value Ref Range Status   11/03/2022 3.300 0.270 - 4.200 uIU/mL Final   ]  Lab Results   Component Value Date     11/03/2022    K 4.6 11/03/2022     11/03/2022    CO2 29 11/03/2022    BUN 12 11/03/2022    CREATININE 0.9 11/03/2022    GLUCOSE 110 (H) 11/03/2022    CALCIUM 9.7 11/03/2022    PROT 6.3 (L) 11/03/2022    LABALBU 4.2 11/03/2022    BILITOT 0.3 11/03/2022    ALKPHOS 76 11/03/2022    AST 13 11/03/2022    ALT 8 11/03/2022    LABGLOM >60 11/03/2022    GFRAA >59 05/03/2022     Lab Results   Component Value Date    CHOL 171 11/03/2022    CHOL 215 (H) 05/03/2022    CHOL 194 04/02/2021     Lab Results   Component Value Date    TRIG 147 11/03/2022    TRIG 186 (H) 05/03/2022    TRIG 160 (H) 04/02/2021     Lab Results   Component Value Date    HDL 54 (L) 11/03/2022    HDL 49 (L) 05/03/2022    HDL 57 (L) 04/02/2021     Lab Results   Component Value Date    LDLCALC 88 11/03/2022    LDLCALC 129 05/03/2022    LDLCALC 105 04/02/2021     Lab Results   Component Value Date/Time     11/03/2022 12:44 PM    K 4.6 11/03/2022 12:44 PM     11/03/2022 12:44 PM    CO2 29 11/03/2022 12:44 PM    BUN 12 11/03/2022 12:44 PM    CREATININE 0.9 11/03/2022 12:44 PM    GLUCOSE 110 11/03/2022 12:44 PM    CALCIUM 9.7 11/03/2022 12:44 PM      Lab Results   Component Value Date    WBC 5.3 11/03/2022    HGB 11.5 (L) 11/03/2022    HCT 37.1 11/03/2022    MCV 91.6 11/03/2022     11/03/2022    LYMPHOPCT 25.0 11/03/2022    RBC 4.05 (L) 11/03/2022    MCH 28.4 11/03/2022    MCHC 31.0 (L) 11/03/2022    RDW 13.8 11/03/2022     Lab Results   Component Value Date    VITD25 78.1 11/03/2022     Labs reviewed from 11/23/2022    Subjective:      Review of Systems   Constitutional:  Negative for fatigue, fever and unexpected weight change. HENT:  Negative for ear discharge, ear pain, mouth sores, sore throat and trouble swallowing. Eyes:  Negative for discharge, itching and visual disturbance. Respiratory:  Negative for cough, choking, shortness of breath, wheezing and stridor. Cardiovascular:  Positive for leg swelling. Negative for chest pain and palpitations. Gastrointestinal:  Negative for abdominal distention, abdominal pain, blood in stool, constipation, diarrhea, nausea and vomiting. Endocrine: Negative for cold intolerance, polydipsia and polyuria.    Genitourinary: Negative for difficulty urinating, dysuria, frequency and urgency. Burning rectum   Musculoskeletal:  Positive for back pain. Negative for arthralgias and gait problem. Skin:  Negative for color change and rash. Allergic/Immunologic: Negative for food allergies and immunocompromised state. Neurological:  Negative for dizziness, tremors, syncope, speech difficulty, weakness and headaches. Unsteady eleni   Report   Hematological:  Negative for adenopathy. Does not bruise/bleed easily. Psychiatric/Behavioral:  Negative for confusion and hallucinations. Objective:     Physical Exam  Constitutional:       General: She is not in acute distress. Appearance: She is well-developed. HENT:      Head: Normocephalic and atraumatic. Eyes:      General: No scleral icterus. Right eye: No discharge. Left eye: No discharge. Pupils: Pupils are equal, round, and reactive to light. Neck:      Thyroid: No thyromegaly. Vascular: No JVD. Cardiovascular:      Rate and Rhythm: Normal rate and regular rhythm. Heart sounds: Normal heart sounds. No murmur heard. Pulmonary:      Effort: Pulmonary effort is normal. No respiratory distress. Breath sounds: Normal breath sounds. No wheezing or rales. Abdominal:      General: Bowel sounds are normal. There is no distension. Palpations: Abdomen is soft. There is no mass. Tenderness: There is no abdominal tenderness. There is no guarding or rebound. Musculoskeletal:         General: No tenderness. Normal range of motion. Cervical back: Normal range of motion and neck supple. Skin:     General: Skin is warm and dry. Findings: No erythema or rash. Neurological:      Mental Status: She is alert and oriented to person, place, and time. Cranial Nerves: No cranial nerve deficit. Coordination: Coordination normal.      Deep Tendon Reflexes: Reflexes are normal and symmetric.  Reflexes normal. Psychiatric:         Mood and Affect: Mood is not depressed. Behavior: Behavior normal.         Thought Content: Thought content normal.         Judgment: Judgment normal.     /64   Pulse 64   SpO2 96%           Assessment:      Problem List       Chronic edema     Bilateral lower extremities but the right is chronic venous stasis changes          Relevant Medications    bumetanide (BUMEX) 1 MG tablet    atorvastatin (LIPITOR) 40 MG tablet    carvedilol (COREG) 12.5 MG tablet    Memory loss     Stable with Aricept          Postmenopausal     Needs DEXA            Relevant Orders    DEXA BONE DENSITY 2 SITES    Rectal burning - Primary     Refuses exam today           Chronic pain     She is wanting more steroid injections explained to her that is not something that she can do especially since she has not had a DEXA scan in 5 years          Relevant Medications    butalbital-acetaminophen-caffeine (FIORICET, ESGIC) -40 MG per tablet    cyclobenzaprine (FLEXERIL) 10 MG tablet    diclofenac (VOLTAREN) 75 MG EC tablet    venlafaxine (EFFEXOR) 25 MG tablet    sertraline (ZOLOFT) 25 MG tablet    Hypertension     Stable with Bumex and carvedilol             Plan:        Patient given educational materials - see patient instructions. Discussed use, benefit, and side effects of prescribed medications. Allpatient questions answered. Pt voiced understanding. Reviewed health maintenance. Instructed to continue current medications, diet and exercise. Patient agreed with treatment plan. Follow up as directed. MEDICATIONS:  No orders of the defined types were placed in this encounter. ORDERS:  Orders Placed This Encounter   Procedures    DEXA BONE DENSITY 2 SITES    External Referral To Physical Therapy       Follow-up:  Return in about 3 months (around 2/28/2023) for have labs done prior to appt.     PATIENT INSTRUCTIONS:  Patient Instructions    Anus burning   have vaginal exam  with  Ray  Diff urinating;  have  vaginal exam with Dr Simpson Cons  Hypertension The current medical regimen is effective;  continue present plan and medications. 4. Postmenopausal   bone density   5. Chronic edema;  stable with current meds   6. Memory loss;  stable with aricept   Electronically signed by TAMIA Centeno on 11/28/2022 at 5:27 PM    @    EMRagon/transcription disclaimer:  Much of this encounter note is electronic transcription/translation of spoken language to printed texts. The electronic translation of spoken language may be erroneous, or at times,nonsensical words or phrases may be inadvertently transcribed.   Although I have reviewed the note for such errors, some may still exist.

## 2022-12-19 DIAGNOSIS — M54.2 NECK PAIN: ICD-10-CM

## 2022-12-19 NOTE — TELEPHONE ENCOUNTER
Audie Aguirre called requesting a refill of the below medication which has been pended for you:     Requested Prescriptions     Pending Prescriptions Disp Refills    SUMAtriptan (IMITREX) 50 MG tablet [Pharmacy Med Name: SUMATRIPTAN SUCCINATE 50MG TABLET] 18 tablet 1     Sig: TAKE 1 TABLET BY MOUTH TWICE DAILY AS NEEDED FOR MIGRAINE. MAX 2 TABLETS DAILY.     cyclobenzaprine (FLEXERIL) 10 MG tablet [Pharmacy Med Name: CYCLOBENZAPRINE HYDROCHLORIDE 10MG TABLET] 90 tablet 0     Sig: TAKE 1 TABLET BY MOUTH 3 TIMES DAILY AS NEEDED FOR MUSCLE SPASMS       Last Appointment Date: 11/28/2022  Next Appointment Date: 2/27/2023    Allergies   Allergen Reactions    Ambien [Zolpidem Tartrate]     Codeine     Lyrica [Pregabalin]     Morphine     Requip [Ropinirole Hcl]     Tizanidine      Terrible nightmares

## 2022-12-20 RX ORDER — CYCLOBENZAPRINE HCL 10 MG
TABLET ORAL
Qty: 90 TABLET | Refills: 0 | Status: SHIPPED | OUTPATIENT
Start: 2022-12-20

## 2022-12-20 RX ORDER — SUMATRIPTAN 50 MG/1
TABLET, FILM COATED ORAL
Qty: 18 TABLET | Refills: 1 | Status: SHIPPED | OUTPATIENT
Start: 2022-12-20

## 2023-01-05 ENCOUNTER — TELEPHONE (OUTPATIENT)
Dept: INTERNAL MEDICINE | Age: 83
End: 2023-01-05

## 2023-01-05 NOTE — TELEPHONE ENCOUNTER
Sw pt she would like to know if you have any recommendations for physical therapy she is wanting to know how to help herself in emergency situations .

## 2023-01-16 ENCOUNTER — OFFICE VISIT (OUTPATIENT)
Dept: OBSTETRICS AND GYNECOLOGY | Facility: CLINIC | Age: 83
End: 2023-01-16
Payer: MEDICARE

## 2023-01-16 VITALS
SYSTOLIC BLOOD PRESSURE: 160 MMHG | BODY MASS INDEX: 37.17 KG/M2 | DIASTOLIC BLOOD PRESSURE: 92 MMHG | HEIGHT: 62 IN | WEIGHT: 202 LBS

## 2023-01-16 DIAGNOSIS — K64.4 EXTERNAL HEMORRHOIDS: ICD-10-CM

## 2023-01-16 DIAGNOSIS — K62.89 RECTAL PAIN: Primary | ICD-10-CM

## 2023-01-16 DIAGNOSIS — K59.00 CONSTIPATION, UNSPECIFIED CONSTIPATION TYPE: ICD-10-CM

## 2023-01-16 PROCEDURE — 99213 OFFICE O/P EST LOW 20 MIN: CPT | Performed by: NURSE PRACTITIONER

## 2023-01-16 RX ORDER — POLYETHYLENE GLYCOL 3350 17 G/17G
17 POWDER, FOR SOLUTION ORAL DAILY
Qty: 100 EACH | Refills: 3 | Status: SHIPPED | OUTPATIENT
Start: 2023-01-16

## 2023-01-16 RX ORDER — ATORVASTATIN CALCIUM 40 MG/1
1 TABLET, FILM COATED ORAL DAILY
COMMUNITY
Start: 2023-01-16

## 2023-01-16 RX ORDER — DICLOFENAC SODIUM 75 MG/1
75 TABLET, DELAYED RELEASE ORAL 2 TIMES DAILY
COMMUNITY
Start: 2023-01-09

## 2023-01-16 RX ORDER — ATORVASTATIN CALCIUM 40 MG/1
TABLET, FILM COATED ORAL
Qty: 90 TABLET | Refills: 3 | Status: SHIPPED | OUTPATIENT
Start: 2023-01-16

## 2023-01-16 NOTE — PROGRESS NOTES
Chief Complaint   Patient presents with   • Rectal Prolapse     Patient reports having a rectal prolapse or possible hemorrhoid for close to a year, but describes pain getting worse.  PCP wouldn't examine or look for prolapse patient states.  She denies rectal bleeding.  She notices this protrusion all the time but sometimes it feels 'full' and sometimes it feels 'empty', but sitting alone causes pain.  Pt voices no other complaints or concerns at this time.       History:  Jennifer Gomes is a 82 y.o. female who presents today for follow-up for evaluation of the above:    HPI     Patient present today with c/o rectal pain and feeling of something prolapsing from her anus.   She reports this has been occurring for a year and not improving.   She does report hard stool and difficultly passing BMs at times.         ROS:  Review of Systems   Constitutional: Negative for fatigue and unexpected weight change.   HENT: Negative.    Eyes: Negative.    Respiratory: Negative.    Cardiovascular: Negative.    Gastrointestinal: Negative for abdominal pain, constipation and diarrhea.   Endocrine: Negative.    Genitourinary: Negative for difficulty urinating, dyspareunia, genital sores, menstrual problem, pelvic pain, vaginal bleeding, vaginal discharge and vaginal pain.   Musculoskeletal: Negative.    Skin: Negative.    Neurological: Negative.    Psychiatric/Behavioral: Negative.        Ms. Gomes  reports that she has never smoked. She has never used smokeless tobacco. She reports current alcohol use. She reports that she does not use drugs.      Current Outpatient Medications:   •  ALPRAZolam (XANAX) 1 MG tablet, Take 1 tablet by mouth 3 (Three) Times a Day As Needed for Anxiety or Sleep., Disp: 90 tablet, Rfl: 2  •  atorvastatin (LIPITOR) 40 MG tablet, Take 40 mg by mouth Every Night., Disp: , Rfl:   •  atorvastatin (LIPITOR) 40 MG tablet, Take 1 tablet by mouth Daily., Disp: , Rfl:   •  bumetanide (BUMEX) 1 MG tablet, Take 1 mg  by mouth Daily., Disp: , Rfl:   •  butalbital-acetaminophen-caffeine (FIORICET, ESGIC) -40 MG per tablet, Take 1 tablet by mouth 2 (Two) Times a Day As Needed for Headache., Disp: , Rfl:   •  carvedilol (COREG) 12.5 MG tablet, Take 12.5 mg by mouth 2 (Two) Times a Day With Meals., Disp: , Rfl:   •  coenzyme Q10 100 MG capsule, Take 100 mg by mouth Daily., Disp: , Rfl:   •  cyclobenzaprine (FLEXERIL) 10 MG tablet, , Disp: , Rfl:   •  diclofenac (VOLTAREN) 75 MG EC tablet, Take 75 mg by mouth 2 (Two) Times a Day., Disp: , Rfl:   •  diphenhydrAMINE (BENADRYL) 25 mg capsule, Take 25 mg by mouth Every 6 (Six) Hours As Needed for Itching., Disp: , Rfl:   •  donepezil (ARICEPT) 10 MG tablet, Take 10 mg by mouth Every Night., Disp: , Rfl:   •  ergocalciferol (ERGOCALCIFEROL) 92545 units capsule, Take 50,000 Units by mouth 1 (One) Time Per Week. Saturday, Disp: , Rfl:   •  ipratropium (ATROVENT) 0.06 % nasal spray, 2 sprays into the nostril(s) as directed by provider 4 (Four) Times a Day As Needed for Rhinitis. For drainage, Disp: 45 mL, Rfl: 3  •  ipratropium (ATROVENT) 0.06 % nasal spray, 2 sprays., Disp: , Rfl:   •  LACTASE ENZYME PO, Take 3 tablets by mouth As Needed (takes before dairy products)., Disp: , Rfl:   •  lansoprazole (PREVACID) 30 MG capsule, Take 30 mg by mouth Every Morning., Disp: , Rfl:   •  levothyroxine (SYNTHROID, LEVOTHROID) 50 MCG tablet, Take 50 mcg by mouth Every Morning., Disp: , Rfl:   •  oxyCODONE (ROXICODONE) 15 MG immediate release tablet, , Disp: , Rfl:   •  sertraline (ZOLOFT) 25 MG tablet, Take 1 tablet by mouth Daily., Disp: 90 tablet, Rfl: 3  •  SUMAtriptan (IMITREX) 50 MG tablet, Take 1 tablet by mouth 2 (Two) Times a Day As Needed for Migraine., Disp: 30 tablet, Rfl: 3  •  venlafaxine (EFFEXOR) 25 MG tablet, Take 1 tablet by mouth Daily., Disp: 90 tablet, Rfl: 3  •  Mirabegron ER (MYRBETRIQ) 25 MG tablet sustained-release 24 hour 24 hr tablet, Take 2 tablets by mouth Daily., Disp:  ", Rfl:   •  polyethylene glycol (MiraLax) 17 g packet, Take 17 g by mouth Daily., Disp: 100 each, Rfl: 3      OBJECTIVE:  /92   Ht 157.5 cm (62\")   Wt 91.6 kg (202 lb)   LMP  (LMP Unknown)   BMI 36.95 kg/m²    Physical Exam  Exam conducted with a chaperone present.   Constitutional:       General: She is not in acute distress.  Pulmonary:      Effort: No respiratory distress.   Genitourinary:     Labia:         Right: No rash.         Left: No rash.       Urethra: No prolapse.      Vagina: Normal.      Uterus: Absent.       Rectum: External hemorrhoid present.      Comments: Uterus and cervix are absent  Neurological:      Mental Status: She is oriented to person, place, and time.   Psychiatric:         Behavior: Behavior normal.         Assessment/Plan    Diagnoses and all orders for this visit:    1. Rectal pain (Primary)    2. Constipation, unspecified constipation type  -     polyethylene glycol (MiraLax) 17 g packet; Take 17 g by mouth Daily.  Dispense: 100 each; Refill: 3    3. External hemorrhoids    discussed using OTC topical hemorrhoid treatments including proctofoam and tux pads.   She is recommended to use stool softener consistently to reduce constipation.   Can consider referral to general surgery if pain worsening.        An After Visit Summary was printed and given to the patient at discharge.  Return if symptoms worsen or fail to improve, for Next scheduled follow up. Sooner if problems arise.          Daphne MORA. 1/17/2023   Electronically Signed  "

## 2023-01-16 NOTE — TELEPHONE ENCOUNTER
Lelia Niño called requesting a refill of the below medication which has been pended for you:     Requested Prescriptions     Pending Prescriptions Disp Refills    atorvastatin (LIPITOR) 40 MG tablet [Pharmacy Med Name: ATORVASTATIN TABS 40MG] 90 tablet 3     Sig: TAKE 1 TABLET DAILY AS DIRECTED       Last Appointment Date: 11/28/2022  Next Appointment Date: 2/27/2023    Allergies   Allergen Reactions    Ambien [Zolpidem Tartrate]     Codeine     Lyrica [Pregabalin]     Morphine     Requip [Ropinirole Hcl]     Tizanidine      Terrible nightmares

## 2023-01-23 ENCOUNTER — HOSPITAL ENCOUNTER (OUTPATIENT)
Dept: WOMENS IMAGING | Age: 83
Discharge: HOME | End: 2023-01-23
Payer: MEDICARE

## 2023-01-23 DIAGNOSIS — Z12.31 ENCOUNTER FOR SCREENING MAMMOGRAM FOR BREAST CANCER: ICD-10-CM

## 2023-01-23 DIAGNOSIS — Z78.0 POSTMENOPAUSAL: ICD-10-CM

## 2023-01-23 PROCEDURE — 77080 DXA BONE DENSITY AXIAL: CPT | Performed by: RADIOLOGY

## 2023-01-23 PROCEDURE — 77067 SCR MAMMO BI INCL CAD: CPT

## 2023-01-23 PROCEDURE — 77063 BREAST TOMOSYNTHESIS BI: CPT | Performed by: RADIOLOGY

## 2023-01-23 PROCEDURE — 77067 SCR MAMMO BI INCL CAD: CPT | Performed by: RADIOLOGY

## 2023-01-23 PROCEDURE — 77080 DXA BONE DENSITY AXIAL: CPT

## 2023-01-25 ENCOUNTER — TELEPHONE (OUTPATIENT)
Dept: OBSTETRICS AND GYNECOLOGY | Facility: CLINIC | Age: 83
End: 2023-01-25
Payer: MEDICARE

## 2023-01-25 NOTE — TELEPHONE ENCOUNTER
PA approved for Alprazolam. Approval letter states approval good from 12/26/22 to 1/25/24 as long as patient remains covered under current plan. Approval letter faxed to pharmacy and scanned into chart. Left VM and sent [employer] message.

## 2023-02-01 ENCOUNTER — TELEPHONE (OUTPATIENT)
Dept: PRIMARY CARE CLINIC | Age: 83
End: 2023-02-01

## 2023-02-02 RX ORDER — ANTI-ITCH 1 G/100G
1 OINTMENT TOPICAL 3 TIMES DAILY
Qty: 1 EACH | Refills: 5 | Status: SHIPPED | OUTPATIENT
Start: 2023-02-02

## 2023-02-06 RX ORDER — IPRATROPIUM BROMIDE 42 UG/1
2 SPRAY, METERED NASAL 2 TIMES DAILY
Qty: 15 ML | Refills: 3 | Status: SHIPPED | OUTPATIENT
Start: 2023-02-06

## 2023-02-06 NOTE — TELEPHONE ENCOUNTER
Francisca Mcneal called requesting a refill of the below medication which has been pended for you:     Requested Prescriptions     Pending Prescriptions Disp Refills    ipratropium (ATROVENT) 0.06 % nasal spray 15 mL 3     Si sprays by Each Nostril route 2 times daily       Last Appointment Date: 2022  Next Appointment Date: 2023    Allergies   Allergen Reactions    Ambien [Zolpidem Tartrate]     Codeine     Lyrica [Pregabalin]     Morphine     Requip [Ropinirole Hcl]     Tizanidine      Terrible nightmares

## 2023-02-13 RX ORDER — SUMATRIPTAN 50 MG/1
TABLET, FILM COATED ORAL
Qty: 18 TABLET | Refills: 1 | Status: SHIPPED | OUTPATIENT
Start: 2023-02-13

## 2023-02-13 NOTE — TELEPHONE ENCOUNTER
Akosua Correa called requesting a refill of the below medication which has been pended for you:     Requested Prescriptions     Pending Prescriptions Disp Refills    SUMAtriptan (IMITREX) 50 MG tablet [Pharmacy Med Name: SUMATRIPTAN SUCCINATE 50MG TABLET] 18 tablet 1     Sig: TAKE 1 TABLET BY MOUTH TWICE DAILY AS NEEDED FOR MIGRAINE. MAX 2 TABLETS DAILY.        Last Appointment Date: 11/28/2022  Next Appointment Date: 2/27/2023    Allergies   Allergen Reactions    Ambien [Zolpidem Tartrate]     Codeine     Lyrica [Pregabalin]     Morphine     Requip [Ropinirole Hcl]     Tizanidine      Terrible nightmares

## 2023-02-15 RX ORDER — DONEPEZIL HYDROCHLORIDE 10 MG/1
10 TABLET, FILM COATED ORAL NIGHTLY
Qty: 30 TABLET | Refills: 5 | Status: SHIPPED | OUTPATIENT
Start: 2023-02-15 | End: 2023-05-16

## 2023-02-15 RX ORDER — DICLOFENAC SODIUM 75 MG/1
75 TABLET, DELAYED RELEASE ORAL 2 TIMES DAILY
Qty: 60 TABLET | Refills: 1 | Status: SHIPPED | OUTPATIENT
Start: 2023-02-15

## 2023-02-15 NOTE — TELEPHONE ENCOUNTER
Debbie Vences called requesting a refill of the below medication which has been pended for you:     Requested Prescriptions     Pending Prescriptions Disp Refills    donepezil (ARICEPT) 10 MG tablet [Pharmacy Med Name: DONEPEZIL HCL 10MG TABLET] 30 tablet 5     Sig: TAKE 1 TABLET BY MOUTH NIGHTLY    diclofenac (VOLTAREN) 75 MG EC tablet [Pharmacy Med Name: DICLOFENAC SODIUM DR 75MG TABLET DELAYED RELEASE] 60 tablet 1     Sig: TAKE 1 TABLET BY MOUTH 2 TIMES DAILY       Last Appointment Date: 11/28/2022  Next Appointment Date: 2/27/2023    Allergies   Allergen Reactions    Ambien [Zolpidem Tartrate]     Codeine     Lyrica [Pregabalin]     Morphine     Requip [Ropinirole Hcl]     Tizanidine      Terrible nightmares

## 2023-02-17 ENCOUNTER — TELEPHONE (OUTPATIENT)
Dept: PRIMARY CARE CLINIC | Age: 83
End: 2023-02-17

## 2023-02-17 NOTE — TELEPHONE ENCOUNTER
The patient called to let [facility] Mirna know that her hemorrhoids are not any better. The patient is thinking about seeing either Maria Elena Rooney or Leopoldo Paganini.

## 2023-02-20 ENCOUNTER — TELEPHONE (OUTPATIENT)
Dept: OBSTETRICS AND GYNECOLOGY | Facility: CLINIC | Age: 83
End: 2023-02-20
Payer: MEDICARE

## 2023-02-20 DIAGNOSIS — K64.4 EXTERNAL HEMORRHOIDS: Primary | ICD-10-CM

## 2023-02-20 DIAGNOSIS — K62.89 RECTAL PAIN: ICD-10-CM

## 2023-02-20 NOTE — TELEPHONE ENCOUNTER
Patient called stating she needs a referral to general surgeon due to external  Hemorrhoid. Message sent to Daphne Hussein

## 2023-02-23 DIAGNOSIS — E78.2 MIXED HYPERLIPIDEMIA: ICD-10-CM

## 2023-02-23 DIAGNOSIS — I10 PRIMARY HYPERTENSION: ICD-10-CM

## 2023-02-23 DIAGNOSIS — E55.9 VITAMIN D DEFICIENCY: ICD-10-CM

## 2023-02-23 LAB
ALBUMIN SERPL-MCNC: 3.6 G/DL (ref 3.5–5.2)
ALP BLD-CCNC: 67 U/L (ref 35–104)
ALT SERPL-CCNC: 25 U/L (ref 5–33)
ANION GAP SERPL CALCULATED.3IONS-SCNC: 12 MMOL/L (ref 7–19)
AST SERPL-CCNC: 19 U/L (ref 5–32)
BASOPHILS ABSOLUTE: 0 K/UL (ref 0–0.2)
BASOPHILS RELATIVE PERCENT: 0.4 % (ref 0–1)
BILIRUB SERPL-MCNC: <0.2 MG/DL (ref 0.2–1.2)
BILIRUBIN URINE: NEGATIVE
BLOOD, URINE: ABNORMAL
BUN BLDV-MCNC: 25 MG/DL (ref 8–23)
CALCIUM SERPL-MCNC: 9.3 MG/DL (ref 8.8–10.2)
CHLORIDE BLD-SCNC: 105 MMOL/L (ref 98–111)
CHOLESTEROL, TOTAL: 197 MG/DL (ref 160–199)
CLARITY: ABNORMAL
CO2: 24 MMOL/L (ref 22–29)
COLOR: YELLOW
CREAT SERPL-MCNC: 0.8 MG/DL (ref 0.5–0.9)
EOSINOPHILS ABSOLUTE: 0.4 K/UL (ref 0–0.6)
EOSINOPHILS RELATIVE PERCENT: 7.4 % (ref 0–5)
EPITHELIAL CELLS, UA: ABNORMAL /HPF
GFR SERPL CREATININE-BSD FRML MDRD: >60 ML/MIN/{1.73_M2}
GLUCOSE BLD-MCNC: 113 MG/DL (ref 74–109)
GLUCOSE URINE: NEGATIVE MG/DL
HCT VFR BLD CALC: 34.2 % (ref 37–47)
HDLC SERPL-MCNC: 46 MG/DL (ref 65–121)
HEMOGLOBIN: 11.5 G/DL (ref 12–16)
IMMATURE GRANULOCYTES #: 0 K/UL
KETONES, URINE: NEGATIVE MG/DL
LDL CHOLESTEROL CALCULATED: 119 MG/DL
LEUKOCYTE ESTERASE, URINE: ABNORMAL
LYMPHOCYTES ABSOLUTE: 1.5 K/UL (ref 1.1–4.5)
LYMPHOCYTES RELATIVE PERCENT: 27 % (ref 20–40)
MCH RBC QN AUTO: 31.6 PG (ref 27–31)
MCHC RBC AUTO-ENTMCNC: 33.6 G/DL (ref 33–37)
MCV RBC AUTO: 94 FL (ref 81–99)
MONOCYTES ABSOLUTE: 0.5 K/UL (ref 0–0.9)
MONOCYTES RELATIVE PERCENT: 9.1 % (ref 0–10)
NEUTROPHILS ABSOLUTE: 3 K/UL (ref 1.5–7.5)
NEUTROPHILS RELATIVE PERCENT: 55.9 % (ref 50–65)
NITRITE, URINE: NEGATIVE
PDW BLD-RTO: 13.5 % (ref 11.5–14.5)
PH UA: 5.5 (ref 5–8)
PLATELET # BLD: 174 K/UL (ref 130–400)
PMV BLD AUTO: 11.8 FL (ref 9.4–12.3)
POTASSIUM SERPL-SCNC: 4.3 MMOL/L (ref 3.5–5)
PROTEIN UA: 30 MG/DL
RBC # BLD: 3.64 M/UL (ref 4.2–5.4)
RBC UA: ABNORMAL /HPF (ref 0–2)
SODIUM BLD-SCNC: 141 MMOL/L (ref 136–145)
SPECIFIC GRAVITY UA: 1.02 (ref 1–1.03)
TOTAL PROTEIN: 6 G/DL (ref 6.6–8.7)
TRIGL SERPL-MCNC: 160 MG/DL (ref 0–149)
TSH SERPL DL<=0.05 MIU/L-ACNC: 4.23 UIU/ML (ref 0.27–4.2)
UROBILINOGEN, URINE: 0.2 E.U./DL
VITAMIN D 25-HYDROXY: 115 NG/ML
WBC # BLD: 5.4 K/UL (ref 4.8–10.8)
WBC UA: ABNORMAL /HPF (ref 0–5)
YEAST: PRESENT /HPF

## 2023-02-25 DIAGNOSIS — F41.1 GAD (GENERALIZED ANXIETY DISORDER): ICD-10-CM

## 2023-02-25 DIAGNOSIS — F41.9 ANXIETY: ICD-10-CM

## 2023-02-25 LAB — URINE CULTURE, ROUTINE: NORMAL

## 2023-02-27 ENCOUNTER — TELEPHONE (OUTPATIENT)
Dept: OBSTETRICS AND GYNECOLOGY | Facility: CLINIC | Age: 83
End: 2023-02-27
Payer: MEDICARE

## 2023-02-27 ENCOUNTER — OFFICE VISIT (OUTPATIENT)
Dept: INTERNAL MEDICINE | Age: 83
End: 2023-02-27

## 2023-02-27 VITALS
HEIGHT: 62 IN | WEIGHT: 202 LBS | DIASTOLIC BLOOD PRESSURE: 78 MMHG | BODY MASS INDEX: 37.17 KG/M2 | OXYGEN SATURATION: 98 % | SYSTOLIC BLOOD PRESSURE: 120 MMHG | HEART RATE: 67 BPM

## 2023-02-27 DIAGNOSIS — E78.2 MIXED HYPERLIPIDEMIA: ICD-10-CM

## 2023-02-27 DIAGNOSIS — K64.9 HEMORRHOIDS, UNSPECIFIED HEMORRHOID TYPE: ICD-10-CM

## 2023-02-27 DIAGNOSIS — Z00.00 MEDICARE ANNUAL WELLNESS VISIT, SUBSEQUENT: Primary | ICD-10-CM

## 2023-02-27 DIAGNOSIS — E03.9 HYPOTHYROIDISM (ACQUIRED): ICD-10-CM

## 2023-02-27 DIAGNOSIS — N32.81 OAB (OVERACTIVE BLADDER): ICD-10-CM

## 2023-02-27 DIAGNOSIS — F33.41 RECURRENT MAJOR DEPRESSIVE DISORDER, IN PARTIAL REMISSION (HCC): ICD-10-CM

## 2023-02-27 DIAGNOSIS — E55.9 VITAMIN D DEFICIENCY: ICD-10-CM

## 2023-02-27 RX ORDER — SOLIFENACIN SUCCINATE 10 MG/1
10 TABLET, FILM COATED ORAL DAILY
Qty: 90 TABLET | Refills: 3 | Status: SHIPPED | OUTPATIENT
Start: 2023-02-27 | End: 2024-02-27

## 2023-02-27 RX ORDER — ALPRAZOLAM 1 MG/1
1 TABLET ORAL 3 TIMES DAILY PRN
Qty: 90 TABLET | Refills: 2 | OUTPATIENT
Start: 2023-02-27

## 2023-02-27 SDOH — ECONOMIC STABILITY: FOOD INSECURITY: WITHIN THE PAST 12 MONTHS, THE FOOD YOU BOUGHT JUST DIDN'T LAST AND YOU DIDN'T HAVE MONEY TO GET MORE.: NEVER TRUE

## 2023-02-27 SDOH — ECONOMIC STABILITY: INCOME INSECURITY: HOW HARD IS IT FOR YOU TO PAY FOR THE VERY BASICS LIKE FOOD, HOUSING, MEDICAL CARE, AND HEATING?: NOT HARD AT ALL

## 2023-02-27 SDOH — ECONOMIC STABILITY: HOUSING INSECURITY
IN THE LAST 12 MONTHS, WAS THERE A TIME WHEN YOU DID NOT HAVE A STEADY PLACE TO SLEEP OR SLEPT IN A SHELTER (INCLUDING NOW)?: NO

## 2023-02-27 SDOH — ECONOMIC STABILITY: FOOD INSECURITY: WITHIN THE PAST 12 MONTHS, YOU WORRIED THAT YOUR FOOD WOULD RUN OUT BEFORE YOU GOT MONEY TO BUY MORE.: NEVER TRUE

## 2023-02-27 ASSESSMENT — ENCOUNTER SYMPTOMS
SORE THROAT: 0
SHORTNESS OF BREATH: 0
DIARRHEA: 0
WHEEZING: 0
CHOKING: 0
ABDOMINAL PAIN: 0
TROUBLE SWALLOWING: 0
NAUSEA: 0
BLOOD IN STOOL: 0
VOMITING: 0
EYE DISCHARGE: 0
COLOR CHANGE: 0
STRIDOR: 0
ABDOMINAL DISTENTION: 0
CONSTIPATION: 0
EYE ITCHING: 0
COUGH: 0

## 2023-02-27 ASSESSMENT — LIFESTYLE VARIABLES
HOW MANY STANDARD DRINKS CONTAINING ALCOHOL DO YOU HAVE ON A TYPICAL DAY: PATIENT DOES NOT DRINK
HOW OFTEN DO YOU HAVE A DRINK CONTAINING ALCOHOL: NEVER

## 2023-02-27 ASSESSMENT — PATIENT HEALTH QUESTIONNAIRE - PHQ9
10. IF YOU CHECKED OFF ANY PROBLEMS, HOW DIFFICULT HAVE THESE PROBLEMS MADE IT FOR YOU TO DO YOUR WORK, TAKE CARE OF THINGS AT HOME, OR GET ALONG WITH OTHER PEOPLE: 1
5. POOR APPETITE OR OVEREATING: 1
1. LITTLE INTEREST OR PLEASURE IN DOING THINGS: 3
SUM OF ALL RESPONSES TO PHQ QUESTIONS 1-9: 7
2. FEELING DOWN, DEPRESSED OR HOPELESS: 3
6. FEELING BAD ABOUT YOURSELF - OR THAT YOU ARE A FAILURE OR HAVE LET YOURSELF OR YOUR FAMILY DOWN: 0
3. TROUBLE FALLING OR STAYING ASLEEP: 0
9. THOUGHTS THAT YOU WOULD BE BETTER OFF DEAD, OR OF HURTING YOURSELF: 0
4. FEELING TIRED OR HAVING LITTLE ENERGY: 0
7. TROUBLE CONCENTRATING ON THINGS, SUCH AS READING THE NEWSPAPER OR WATCHING TELEVISION: 0
SUM OF ALL RESPONSES TO PHQ QUESTIONS 1-9: 7
SUM OF ALL RESPONSES TO PHQ9 QUESTIONS 1 & 2: 6
8. MOVING OR SPEAKING SO SLOWLY THAT OTHER PEOPLE COULD HAVE NOTICED. OR THE OPPOSITE, BEING SO FIGETY OR RESTLESS THAT YOU HAVE BEEN MOVING AROUND A LOT MORE THAN USUAL: 0

## 2023-02-27 NOTE — PROGRESS NOTES
[facility] PHYSICIAN SERVICES  [facility] INTERNAL MEDICINE  [address] [address] [place] [address]  Dept: [phone]  Dept Fax: [phone]: [phone]    Sarah Moss (:  1940) is a [de-identified] y.o. female,Established patient  with green, here for evaluation of the following chief complaint(s): Medicare Pepito Guerin is a [de-identified] y.o. female who presents today for her medical conditions/complaints as noted below. Sarah Moss is c/maame Medicare AWV        HPI:     Chief Complaint   Patient presents with    Medicare AWV     HPI     Hemorrhoids; she says she has painful internal  ;  she says she has an external one as well but that is not hurting at all but she thinks it has something in it; ;  she went to Dr Nini Rodriguez and she has appt with Dr Hal Stanton ;  she doesn't want me to examine her today;    Vit d def;  level is too high; she is taking 50,000 weekly;  level is 115   3. Hyperlipidemia;  she is taking lipitor 40 daily ;  cholesterol is ok  trigs are up to 197;    4. Hypothyroidism; stable with levothyroixine;  stable with 50 mcg daily    5. Depression  stable with zoloft   6. OAB she is on Myrbetriq she reports that she does not take it unless she is going out to limit her bathroom trips because when she takes it it makes her have painful urination she feels he is having spasms.   But without it she reports she is going to the bathroom every hour  Past Medical History:   Diagnosis Date    Adenocarcinoma of endometrium, stage 1 ([facility] [address] 2017    Anemia     Arthritis     CAD (coronary artery disease)     \"Stiff Valve\"    Chronic kidney disease     Depression     Fibrocystic breast disease     17 biopsy - benign FCBD w/microcalcifications - [facility]     GERD (gastroesophageal reflux disease)     H/O vaginal bleeding     managed Dr Nini Rodriguez    History of therapeutic radiation     Hypertension     Hypothyroidism (acquired)     Hypothyroidism (acquired)     Mixed hyperlipidemia Obesity     Osteoarthritis     Pain of both hip joints 01/18/2016    Situational anxiety     Sleep apnea     Vitamin D deficiency       Past Surgical History:   Procedure Laterality Date    BREAST BIOPSY Right     BREAST SURGERY Left     Biopsy, Benign    CYST REMOVAL Left     Shoulder cyst, benign    DILATION AND CURETTAGE OF UTERUS      EYE SURGERY  03/2017    HYSTERECTOMY (CERVIX STATUS UNKNOWN)      Removed uterus and cervix due to cancer, 12/17    LEG SURGERY Left     Tib/Fib ORIF    TONSILLECTOMY         Vitals 2/27/2023 11/28/2022 11/3/2022 8/30/2022 5/3/2022 0/5/8261   SYSTOLIC 286 461 [phone]   DIASTOLIC 78 64 72 78 70 80   Site - - - - - -   Position - - - - - -   Pulse 67 64 71 80 72 90   Temp - - 97 - - -   Resp - - - - - -   SpO2 98 96 94 96 99 99   Weight 202 lb - 240 lb - 240 lb 230 lb   Height 5' 2\" - - - - 5' 2\"   Body mass index 36.94 kg/m2 - - - - 42.06 kg/m2   Some recent data might be hidden       Family History   Problem Relation Age of Onset    Cancer Mother     Breast Cancer Paternal Grandmother        Social History     Tobacco Use    Smoking status: Never    Smokeless tobacco: Never   Substance Use Topics    Alcohol use: Yes     Comment: occassionally      Current Outpatient Medications   Medication Sig Dispense Refill    solifenacin (VESICARE) 10 MG tablet Take 1 tablet by mouth daily 90 tablet 3    donepezil (ARICEPT) 10 MG tablet TAKE 1 TABLET BY MOUTH NIGHTLY 30 tablet 5    diclofenac (VOLTAREN) 75 MG EC tablet TAKE 1 TABLET BY MOUTH 2 TIMES DAILY 60 tablet 1    SUMAtriptan (IMITREX) 50 MG tablet TAKE 1 TABLET BY MOUTH TWICE DAILY AS NEEDED FOR MIGRAINE. MAX 2 TABLETS DAILY.  18 tablet 1    ipratropium (ATROVENT) 0.06 % nasal spray 2 sprays by Each Nostril route 2 times daily 15 mL 3    Hydrocortisone Acetate 1 % OINT Apply 1 application topically 3 times daily 1 each 5    atorvastatin (LIPITOR) 40 MG tablet TAKE 1 TABLET DAILY AS DIRECTED 90 tablet 3    cyclobenzaprine (FLEXERIL) 10 MG tablet TAKE 1 TABLET BY MOUTH 3 TIMES DAILY AS NEEDED FOR MUSCLE SPASMS 90 tablet 0    venlafaxine (EFFEXOR) 25 MG tablet Take 1 tablet by mouth Daily 90 tablet 1    sertraline (ZOLOFT) 25 MG tablet Take 1 tablet by mouth daily 90 tablet 1    carvedilol (COREG) 12.5 MG tablet TAKE 1 TABLET TWICE A  tablet 1    lansoprazole (PREVACID) 30 MG delayed release capsule TAKE 1 CAPSULE DAILY EVERY MORNING 30 capsule 5    vitamin D (ERGOCALCIFEROL) 1.25 MG (95563 UT) CAPS capsule TAKE 1 CAPSULE BY MOUTH ONCE A WEEK 12 capsule 2    levothyroxine (SYNTHROID) 50 MCG tablet TAKE 1 TABLET DAILY 90 tablet 3    bumetanide (BUMEX) 1 MG tablet TAKE 1 TABLET DAILY 90 tablet 3    ALPRAZolam (XANAX) 0.5 MG tablet       butalbital-acetaminophen-caffeine (FIORICET, ESGIC) -40 MG per tablet        No current facility-administered medications for this visit.      Allergies   Allergen Reactions    Ambien [Zolpidem Tartrate]     Codeine     Lyrica [Pregabalin]     Morphine     Requip [Ropinirole Hcl]     Tizanidine      Terrible nightmares         Health Maintenance   Topic Date Due    Annual Wellness Visit (AWV)  07/14/2022    DTaP/Tdap/Td vaccine (1 - Tdap) 03/20/2023 (Originally 9/19/1959)    Shingles vaccine (3 of 3) 05/03/2023 (Originally 12/5/2019)    COVID-19 Vaccine (3 - Booster for Santino Floyd series) 06/27/2023 (Originally 5/31/2021)    Depression Screen  08/30/2023    Lipids  02/23/2024    Breast cancer screen  01/23/2025    DEXA (modify frequency per FRAX score)  Completed    Flu vaccine  Completed    Pneumococcal 65+ years Vaccine  Completed    Hepatitis A vaccine  Aged Out    Hib vaccine  Aged Out    Meningococcal (ACWY) vaccine  Aged Out       No results found for: LABA1C  No results found for: PSA, PSADIA  TSH   Date Value Ref Range Status   02/23/2023 4.230 (H) 0.270 - 4.200 uIU/mL Final   ]  Lab Results   Component Value Date     02/23/2023    K 4.3 02/23/2023     02/23/2023    CO2 24 02/23/2023    BUN 25 (H) 02/23/2023    CREATININE 0.8 02/23/2023    GLUCOSE 113 (H) 02/23/2023    CALCIUM 9.3 02/23/2023    PROT 6.0 (L) 02/23/2023    LABALBU 3.6 02/23/2023    BILITOT <0.2 02/23/2023    ALKPHOS 67 02/23/2023    AST 19 02/23/2023    ALT 25 02/23/2023    LABGLOM >60 02/23/2023    GFRAA >59 05/03/2022     Lab Results   Component Value Date    CHOL 197 02/23/2023    CHOL 171 11/03/2022    CHOL 215 (H) 05/03/2022     Lab Results   Component Value Date    TRIG 160 (H) 02/23/2023    TRIG 147 11/03/2022    TRIG 186 (H) 05/03/2022     Lab Results   Component Value Date    HDL 46 (L) 02/23/2023    HDL 54 (L) 11/03/2022    HDL 49 (L) 05/03/2022     Lab Results   Component Value Date    LDLCALC 119 02/23/2023    LDLCALC 88 11/03/2022    LDLCALC 129 05/03/2022     Lab Results   Component Value Date/Time     02/23/2023 12:46 PM    K 4.3 02/23/2023 12:46 PM     02/23/2023 12:46 PM    CO2 24 02/23/2023 12:46 PM    BUN 25 02/23/2023 12:46 PM    CREATININE 0.8 02/23/2023 12:46 PM    GLUCOSE 113 02/23/2023 12:46 PM    CALCIUM 9.3 02/23/2023 12:46 PM      Lab Results   Component Value Date    WBC 5.4 02/23/2023    HGB 11.5 (L) 02/23/2023    HCT 34.2 (L) 02/23/2023    MCV 94.0 02/23/2023     02/23/2023    LYMPHOPCT 27.0 02/23/2023    RBC 3.64 (L) 02/23/2023    MCH 31.6 (H) 02/23/2023    MCHC 33.6 02/23/2023    RDW 13.5 02/23/2023     Lab Results   Component Value Date    VITD25 115.0 (H) 02/23/2023     Labs reviewed from 2/23/2023    Subjective:      Review of Systems   Constitutional:  Negative for fatigue, fever and unexpected weight change. HENT:  Negative for ear discharge, ear pain, mouth sores, sore throat and trouble swallowing. Eyes:  Negative for discharge, itching and visual disturbance. Respiratory:  Negative for cough, choking, shortness of breath, wheezing and stridor. Cardiovascular:  Negative for chest pain, palpitations and leg swelling.    Gastrointestinal:  Negative for abdominal distention, abdominal pain, blood in stool, constipation, diarrhea, nausea and vomiting. Hemorrhoids    Endocrine: Negative for cold intolerance, polydipsia and polyuria. Genitourinary:  Negative for difficulty urinating, dysuria, frequency and urgency. Musculoskeletal:  Negative for arthralgias and gait problem. Skin:  Negative for color change and rash. Allergic/Immunologic: Negative for food allergies and immunocompromised state. Neurological:  Negative for dizziness, tremors, syncope, speech difficulty, weakness and headaches. Hematological:  Negative for adenopathy. Does not bruise/bleed easily. Psychiatric/Behavioral:  Negative for confusion and hallucinations. DEpression      Objective:     Physical Exam  Constitutional:       General: She is not in acute distress. Appearance: She is well-developed. HENT:      Head: Normocephalic and atraumatic. Eyes:      General: No scleral icterus. Right eye: No discharge. Left eye: No discharge. Pupils: Pupils are equal, round, and reactive to light. Neck:      Thyroid: No thyromegaly. Vascular: No JVD. Cardiovascular:      Rate and Rhythm: Normal rate and regular rhythm. Heart sounds: Normal heart sounds. No murmur heard. Pulmonary:      Effort: Pulmonary effort is normal. No respiratory distress. Breath sounds: Normal breath sounds. No wheezing or rales. Abdominal:      General: Bowel sounds are normal. There is no distension. Palpations: Abdomen is soft. There is no mass. Tenderness: There is no abdominal tenderness. There is no guarding or rebound. Musculoskeletal:         General: No tenderness. Normal range of motion. Cervical back: Normal range of motion and neck supple. Skin:     General: Skin is warm and dry. Findings: No erythema or rash. Neurological:      Mental Status: She is alert and oriented to person, place, and time. Cranial Nerves:  No cranial nerve deficit. Coordination: Coordination normal.      Deep Tendon Reflexes: Reflexes are normal and symmetric. Reflexes normal.   Psychiatric:         Mood and Affect: Mood is not depressed. Behavior: Behavior normal.         Thought Content:  Thought content normal.         Judgment: Judgment normal.     /78   Pulse 67   Ht 5' 2\" (1.575 m)   Wt 202 lb (91.6 kg)   SpO2 98%   BMI 36.95 kg/m²           Assessment:      Problem List       Hemorrhoids     She refused exam today she is already seeing GYN and has a pending appointment with surgery but she feels like she has a prolapse of her rectum with internal hemorrhoids          OAB (overactive bladder)     She has been on Myrbetriq but she does not take it routinely as she says feels it causes her to have bladder spasms and pain she reports she urinates every hour at home without it   DC the Myrbetriq and see if Vesicare will give her any better results         Recurrent major depressive disorder, in partial remission ([facility] [address]     She is stable on Zoloft          Relevant Medications    ALPRAZolam (XANAX) 0.5 MG tablet    venlafaxine (EFFEXOR) 25 MG tablet    sertraline (ZOLOFT) 25 MG tablet    donepezil (ARICEPT) 10 MG tablet    Hypothyroidism (acquired)     Level is good today she takes 50 mcg daily          Relevant Medications    levothyroxine (SYNTHROID) 50 MCG tablet    Mixed hyperlipidemia     Cholesterol is okay triglycerides are 197 she is going to watch her carbohydrates          Relevant Medications    bumetanide (BUMEX) 1 MG tablet    carvedilol (COREG) 12.5 MG tablet    atorvastatin (LIPITOR) 40 MG tablet    Other Relevant Orders    CBC with Auto Differential    Comprehensive Metabolic Panel    Lipid Panel    Urinalysis with Reflex to Culture    TSH    Vitamin D deficiency     Her level is too high today at 115 we will have her start taking the vitamin D only twice a month          Relevant Orders    Vitamin D 25 Hydroxy Plan:        Patient given educational materials - see patient instructions. Discussed use, benefit, and side effects of prescribed medications. Allpatient questions answered. Pt voiced understanding. Reviewed health maintenance. Instructed to continue current medications, diet and exercise. Patient agreed with treatment plan. Follow up as directed. MEDICATIONS:  Orders Placed This Encounter   Medications    solifenacin (VESICARE) 10 MG tablet     Sig: Take 1 tablet by mouth daily     Dispense:  90 tablet     Refill:  3           ORDERS:  Orders Placed This Encounter   Procedures    CBC with Auto Differential    Comprehensive Metabolic Panel    Lipid Panel    Vitamin D 25 Hydroxy    Urinalysis with Reflex to Culture    TSH         Follow-up:  Return in about 3 months (around 5/27/2023) for have labs done prior to appt. PATIENT INSTRUCTIONS:  Patient Instructions    Hemorrhoids keep appt with Dr Primitivo Castillo  Vit d def;  start taking twice a month  Hyperlipidemia;  watch your carbs; Hypothyroidism; The current medical regimen is effective;  continue present plan and medications. Depression; stable with zoloft;    OAB:  stop myrbetriq due to side effects;  start vesicare daily   Normal bone density;  continue calcium 600 mg twice daily; Electronically signed by TAMIA Pacheco on 2/27/2023 at 5:06 PM    @    [employer]/transcription disclaimer:  Much of this encounter note is electronic transcription/translation of spoken language to printed texts. The electronic translation of spoken language may be erroneous, or at times,nonsensical words or phrases may be inadvertently transcribed.   Although I have reviewed the note for such errors, some may still exist.      Medicare Annual Wellness Visit    Patricia Alberto is here for Medicare AW    Assessment & Plan   Hemorrhoids, unspecified hemorrhoid type  Assessment & Plan:  She refused exam today she is already seeing GYN and has a pending appointment with surgery but she feels like she has a prolapse of her rectum with internal hemorrhoids   Vitamin D deficiency  Assessment & Plan:  Her level is too high today at 115 we will have her start taking the vitamin D only twice a month   Orders:  -     Vitamin D 25 Hydroxy; Future  Mixed hyperlipidemia  Assessment & Plan:  Cholesterol is okay triglycerides are 197 she is going to watch her carbohydrates   Orders:  -     CBC with Auto Differential; Future  -     Comprehensive Metabolic Panel; Future  -     Lipid Panel; Future  -     Urinalysis with Reflex to Culture; Future  -     TSH; Future  Hypothyroidism (acquired)  Assessment & Plan:  Level is good today she takes 50 mcg daily   Recurrent major depressive disorder, in partial remission ([facility] [address]  Assessment & Plan:  She is stable on Zoloft   OAB (overactive bladder)  Assessment & Plan:  She has been on Myrbetriq but she does not take it routinely as she says feels it causes her to have bladder spasms and pain she reports she urinates every hour at home without it   DC the Myrbetriq and see if Vesicare will give her any better results    Recommendations for Preventive Services Due: see orders and patient instructions/AVS.  Recommended screening schedule for the next 5-10 years is provided to the patient in written form: see Patient Instructions/AVS.     Return in about 3 months (around 5/27/2023) for have labs done prior to appt. Subjective       Patient's complete Health Risk Assessment and screening values have been reviewed and are found in Flowsheets. The following problems were reviewed today and where indicated follow up appointments were made and/or referrals ordered.     Positive Risk Factor Screenings with Interventions:    Fall Risk:  Do you feel unsteady or are you worried about falling? : (!) yes  2 or more falls in past year?: (!) yes  Fall with injury in past year?: no     Interventions:    She uses walker;  has spinal stenosis; Depression:  PHQ-2 Score: 6  PHQ-9 Total Score: 7    Interpretation:   1-4 = minimal  5-9 = mild  10-14 = moderate  15-19 = moderately severe  20-27 = severe  Interventions:  Holly Gutierrez reports to be stable          Self-assessment of health: In general, how would you say your health is?: (!) Poor    Interventions:  Patient declines any further evaluation or treatment    General HRA Questions:  Select all that apply: (!) New or Increased Pain, Social Isolation, Stress    Pain Interventions:  She has chronic back pain on meds with pain managment  Rectal pain; she has appt with general surgery     Social Isolation Interventions:  Mostly her choice     Stress Interventions:  Patient declined any further interventions or treatment       Weight and Activity:  Physical Activity: Inactive    Days of Exercise per Week: 0 days    Minutes of Exercise per Session: 0 min     On average, how many days per week do you engage in moderate to strenuous exercise (like a brisk walk)?: 0 days  Have you lost any weight without trying in the past 3 months?: No  Body mass index: (!) 36.94    Inactivity Interventions:  Patient declined any further interventions or treatment  Obesity Interventions:  Hard to exercise with her back and she says she has cut her diet all that she is going to do            Hearing Screen:  Do you or your family notice any trouble with your hearing that hasn't been managed with hearing aids?: (!) Yes    Interventions:  Suggest for testing but she doesn't want hearing aids     Vision Screen:  Do you have difficulty driving, watching TV, or doing any of your daily activities because of your eyesight?: No  Have you had an eye exam within the past year?: (!) No  No results found.     Interventions:   Patient encouraged to make appointment with their eye specialist    Safety:  Do all of your stairways have a railing or banister?: (!) No  Interventions:  Patient declined any further interventions or treatment    ADL's:   Patient reports needing help with:  Select all that apply: (!) Laundry, Housekeeping, Transportation  Interventions:  Her daughter and  help her     Advanced Directives:  Do you have a Living Will?: (!) No    Intervention:  has NO advanced directive - not interested in additional information                       Objective   Vitals:    02/27/23 1506   BP: 120/78   Pulse: 67   SpO2: 98%   Weight: 202 lb (91.6 kg)   Height: 5' 2\" (1.575 m)      Body mass index is 36.95 kg/m². Allergies   Allergen Reactions    Ambien [Zolpidem Tartrate]     Codeine     Lyrica [Pregabalin]     Morphine     Requip [Ropinirole Hcl]     Tizanidine      Terrible nightmares       Prior to Visit Medications    Medication Sig Taking? Authorizing Provider   solifenacin (VESICARE) 10 MG tablet Take 1 tablet by mouth daily Yes TAMIA Love   donepezil (ARICEPT) 10 MG tablet TAKE 1 TABLET BY MOUTH NIGHTLY Yes TAMIA Love   diclofenac (VOLTAREN) 75 MG EC tablet TAKE 1 TABLET BY MOUTH 2 TIMES DAILY Yes TAMIA Love   SUMAtriptan (IMITREX) 50 MG tablet TAKE 1 TABLET BY MOUTH TWICE DAILY AS NEEDED FOR MIGRAINE. MAX 2 TABLETS DAILY.  Yes TAMIA Love   ipratropium (ATROVENT) 0.06 % nasal spray 2 sprays by Each Nostril route 2 times daily Yes TAMIA Love   Hydrocortisone Acetate 1 % OINT Apply 1 application topically 3 times daily Yes TAMIA Love   atorvastatin (LIPITOR) 40 MG tablet TAKE 1 TABLET DAILY AS DIRECTED Yes TAMIA Love   cyclobenzaprine (FLEXERIL) 10 MG tablet TAKE 1 TABLET BY MOUTH 3 TIMES DAILY AS NEEDED FOR MUSCLE SPASMS Yes TAMIA Love   venlafaxine (EFFEXOR) 25 MG tablet Take 1 tablet by mouth Daily Yes TAMIA Love   sertraline (ZOLOFT) 25 MG tablet Take 1 tablet by mouth daily Yes TAMIA Love   carvedilol (COREG) 12.5 MG tablet TAKE 1 TABLET TWICE A DAY Yes TAMIA Love   lansoprazole (PREVACID) 30 MG delayed release capsule TAKE 1 CAPSULE DAILY EVERY MORNING Yes TAMIA Adam   vitamin D (ERGOCALCIFEROL) 1.25 MG (28316 UT) CAPS capsule TAKE 1 CAPSULE BY MOUTH ONCE A WEEK Yes TAMIA Adam   levothyroxine (SYNTHROID) 50 MCG tablet TAKE 1 TABLET DAILY Yes TAMIA Adam   bumetanide (BUMEX) 1 MG tablet TAKE 1 TABLET DAILY Yes TAMIA Adam   ALPRAZolam Janice Trinh) 0.5 MG tablet  Yes Historical Provider, MD   butalbital-acetaminophen-caffeine (FIORICET, [address]) -40 MG per tablet  Yes Historical Provider, MD Martinez (Including outside providers/suppliers regularly involved in providing care):   Patient Care Team:  TAMIA Adam as PCP - General (Nurse Practitioner Acute Care)  TAMIA Adam as PCP - Empaneled Provider  [facility] (Obstetrics & Gynecology)  Rob Fowler as Consulting Physician (Radiology)  Damien Walters MD as Consulting Physician (Neurosurgery)     Reviewed and updated this visit:  Tobacco  Allergies  Meds  Med Hx  Surg Hx  Soc Hx  Fam Hx             TAMIA Adam

## 2023-02-27 NOTE — ASSESSMENT & PLAN NOTE
Her level is too high today at 115 we will have her start taking the vitamin D only twice a month

## 2023-02-27 NOTE — ASSESSMENT & PLAN NOTE
She refused exam today she is already seeing GYN and has a pending appointment with surgery but she feels like she has a prolapse of her rectum with internal hemorrhoids

## 2023-02-27 NOTE — ASSESSMENT & PLAN NOTE
She has been on Myrbetriq but she does not take it routinely as she says feels it causes her to have bladder spasms and pain she reports she urinates every hour at home without it   DC the Myrbetriq and see if Vesicare will give her any better results

## 2023-02-27 NOTE — PATIENT INSTRUCTIONS
Hemorrhoids keep appt with Dr Carie Graves  Vit d def;  start taking twice a month  Hyperlipidemia;  watch your carbs; Hypothyroidism; The current medical regimen is effective;  continue present plan and medications. Depression; stable with zoloft;    OAB:  stop myrbetriq due to side effects;  start vesicare daily   Normal bone density;  continue calcium 600 mg twice daily;         Preventing Falls: Care Instructions  Overview     Getting around your home safely can be a challenge if you have injuries or health problems that make it easy for you to fall. Loose rugs and furniture in walkways are among the dangers for many older people who have problems walking or who have poor eyesight. People who have conditions such as arthritis, osteoporosis, or dementia also have to be careful not to fall. You can make your home safer with a few simple measures. Follow-up care is a key part of your treatment and safety. Be sure to make and go to all appointments, and call your doctor if you are having problems. It's also a good idea to know your test results and keep a list of the medicines you take. How can you care for yourself at home? Taking care of yourself  Exercise regularly to improve your strength, muscle tone, and balance. Walk if you can. Swimming may be a good choice if you cannot walk easily. Have your vision and hearing checked each year or any time you notice a change. If you have trouble seeing and hearing, you might not be able to avoid objects and could lose your balance. Know the side effects of the medicines you take. Ask your doctor or pharmacist whether the medicines you take can affect your balance. Sleeping pills or sedatives can affect your balance. Limit the amount of alcohol you drink. Alcohol can impair your balance and other senses. Ask your doctor whether calluses or corns on your feet need to be removed.  If you wear loose-fitting shoes because of calluses or corns, you can lose your balance and fall. Talk to your doctor if you have numbness in your feet. You may get dizzy if you do not drink enough water. To prevent dehydration, drink plenty of fluids. Choose water and other clear liquids. If you have kidney, heart, or liver disease and have to limit fluids, talk with your doctor before you increase the amount of fluids you drink. Preventing falls at home  Remove raised doorway thresholds, throw rugs, and clutter. Repair loose carpet or raised areas in the floor. Move furniture and electrical cords to keep them out of walking paths. Use nonskid floor wax, and wipe up spills right away, especially on ceramic tile floors. If you use a walker or cane, put rubber tips on it. If you use crutches, clean the bottoms of them regularly with an abrasive pad, such as steel wool. Keep your house well lit, especially stairways, porches, and outside walkways. Use night-lights in areas such as hallways and bathrooms. Add extra light switches or use remote switches (such as switches that go on or off when you clap your hands) to make it easier to turn lights on if you have to get up during the night. Install sturdy handrails on stairways. Move items in your cabinets so that the things you use a lot are on the lower shelves (about waist level). Keep a cordless phone and a flashlight with new batteries by your bed. If possible, put a phone in each of the main rooms of your house, or carry a cell phone in case you fall and cannot reach a phone. Or, you can wear a device around your neck or wrist. You push a button that sends a signal for help. Wear low-heeled shoes that fit well and give your feet good support. Use footwear with nonskid soles. Check the heels and soles of your shoes for wear. Repair or replace worn heels or soles. Do not wear socks without shoes on smooth floors, such as wood. Walk on the grass when the sidewalks are slippery.  If you live in an area that gets snow and ice in the winter, sprinkle salt on slippery steps and sidewalks. Or ask a family member or friend to do this for you. Preventing falls in the bath  Install grab bars and nonskid mats inside and outside your shower or tub and near the toilet and sinks. Use shower chairs and bath benches. Use a hand-held shower head that will allow you to sit while showering. Get into a tub or shower by putting the weaker leg in first. Get out of a tub or shower with your strong side first.  Repair loose toilet seats and consider installing a raised toilet seat to make getting on and off the toilet easier. Keep your bathroom door unlocked while you are in the shower. Where can you learn more? Go to http://www.galloway.com/ and enter G117 to learn more about \"Preventing Falls: Care Instructions. \"  Current as of: May 4, 2022               Content Version: 13.5  © 6543-9193 [employer]. Care instructions adapted under license by [facility] ([facility]). If you have questions about a medical condition or this instruction, always ask your healthcare professional. [place] [address] any warranty or liability for your use of this information. Learning About Mindfulness for Stress  What are mindfulness and stress? Stress is what you feel when you have to handle more than you are used to. A lot of things can cause stress. You may feel stress when you go on a job interview, take a test, or run a race. This kind of short-term stress is normal and even useful. It can help you if you need to work hard or react quickly. Stress also can last a long time. Long-term stress is caused by stressful situations or events. Examples of long-term stress include long-term health problems, ongoing problems at work, and conflicts in your family. Long-term stress can harm your health. Mindfulness is a focus only on things happening in the present moment.  It's a process of purposefully paying attention to and being aware of your surroundings, your emotions, your thoughts, and how your body feels. You are aware of these things, but you aren't judging these experiences as \"good\" or \"bad. \" Mindfulness can help you learn to calm your mind and body to help you cope with illness, pain, and stress. How does mindfulness help to relieve stress? Mindfulness can help quiet your mind and relax your body. Studies show that it can help some people sleep better, feel less anxious, and bring their blood pressure down. And it's been shown to help some people live and cope better with certain health problems like heart disease, depression, chronic pain, and cancer. How do you practice mindfulness? To be mindful is to pay attention, to be present, and to be accepting. When you're mindful, you do just one thing and you pay close attention to that one thing. For example, you may sit quietly and notice your emotions or how your food tastes and smells. When you're present, you focus on the things that are happening right now. You let go of your thoughts about the past and the future. When you dwell on the past or the future, you miss moments that can heal and strengthen you. You may miss moments like hearing a child laugh or seeing a friendly face when you think you're all alone. When you're accepting, you don't  the present moment. Instead you accept your thoughts and feelings as they come. You can practice anytime, anywhere, and in any way you choose. You can practice in many ways. Here are a few ideas:  While doing your chores, like washing the dishes, let your mind focus on what's in your hand. What does the dish feel like? Is the water warm or cold? Go outside and take a few deep breaths. What is the air like? Is it warm or cold? When you can, take some time at the start of your day to sit alone and think. Take a slow walk by yourself. Count your steps while you breathe in and out.   Try yoga breathing exercises, stretches, and poses to strengthen and relax your muscles. At work, if you can, try to stop for a few moments each hour. Note how your body feels. Let yourself regroup and let your mind settle before you return to what you were doing. If you struggle with anxiety or \"worry thoughts,\" imagine your mind as a blue colby and your worry thoughts as clouds. Now imagine those worry thoughts floating across your mind's colby. Just let them pass by as you watch. Follow-up care is a key part of your treatment and safety. Be sure to make and go to all appointments, and call your doctor if you are having problems. It's also a good idea to know your test results and keep a list of the medicines you take. Where can you learn more? Go to http://www.galloway.com/ and enter M676 to learn more about \"Learning About Mindfulness for Stress. \"  Current as of: February 9, 2022               Content Version: 13.5  © 2006-2022 "[employer] Care instructions adapted under license by [facility] [place] ([facility]). If you have questions about a medical condition or this instruction, always ask your healthcare professional. [address] any warranty or liability for your use of this information. Learning About Emotional Support  When do you need emotional support? You might find getting support from others helpful when you have a long-term health problem. Often people feel alone, confused, or scared when coping with an illness. But you aren't alone. Other people are going through the same thing you are and know how you feel. Talking with others about your feelings can help you feel better. Your family and friends can give you support. So can your doctor, a support group, or a Baptist. If you have a support network, you will not feel as alone. You will learn new ways to deal with your situation, and you may try harder to overcome it. Where you can get support  Family and friends:  They can help you cope by giving you comfort and encouragement. Counseling: Professional counseling can help you cope with situations that interfere with your life and cause stress. Counseling can help you understand and deal with your illness. Your doctor: Find a doctor you trust and feel comfortable with. Be open and honest about your fears and concerns. Your doctor can help you get the right medical treatments, including counseling. Spiritual or Cheondoism groups: They can provide comfort and may be able to help you find counseling or other social support services. Social groups: They can help you meet new people and get involved in activities you enjoy. Community support groups: In a support group, you can talk to others who have dealt with the same problems or illness as you. You can encourage one another and learn ways to cope with tough emotions. How to find a support group  Ask your doctor, counselor, or other health professional for suggestions. Contact your local Evangelical, Confucianist, Bahai, or other Cheondoism group. Ask your family and friends. Ask people who have the same health concerns. Go online. Forums and blogs let you read messages from others and leave your own messages. You can exchange stories, vent your frustrations, and ask and answer questions. Contact a city, state, or national group that provides support for your health concerns. Your library or community center may have a list of these groups. Or you can look for information online. Look for a support group that works for you. Ask yourself if you prefer structure and would like a , or if you would like a less formal group. Do you prefer face-to-face meetings? Or do you feel more secure in online chat rooms or forums? Supportive relationships  A supportive relationship includes emotional support such as love, trust, and understanding, as well as advice and concrete help, such as help managing your time.   Reach out to others  Family and friends can help you. Ask them to:  Listen to you and give you encouragement. This can keep you from feeling hopeless or alone. Help with small daily tasks or with bigger problems. A helping hand can keep you from feeling overwhelmed. Help you manage a health problem. For example, ask them to go to doctor visits with you. Your loved ones can offer support by being involved in your medical care. Respect your relationships  A good relationship is also a two-way street. You count on help from others, but they also count on you. Know your friends' limits. You don't have to see or call your friends every day. If you are going through a rough patch, ask friends if you can contact them outside of the usual boundaries. Don't always complain or talk about yourself. Know when it's time to stop talking and listen or just enjoy your friend's company. Know that good friends can be a bad influence. For example, if a friend encourages you to drink when you know it will harm you, you may want to end the friendship. Where can you learn more? Go to http://www.woods.com/ and enter G092 to learn more about \"Learning About Emotional Support. \"  Current as of: February 9, 2022               Content Version: 13.5  © 6431-9103 [employer]. Care instructions adapted under license by [facility] ([facility]). If you have questions about a medical condition or this instruction, always ask your healthcare professional. [place] [address] any warranty or liability for your use of this information. Learning About Emotional Support  When do you need emotional support? You might find getting support from others helpful when you have a long-term health problem. Often people feel alone, confused, or scared when coping with an illness. But you aren't alone. Other people are going through the same thing you are and know how you feel. Talking with others about your feelings can help you feel better.   Your family and friends can give you support. So can your doctor, a support group, or a Sikh. If you have a support network, you will not feel as alone. You will learn new ways to deal with your situation, and you may try harder to overcome it. Where you can get support  Family and friends: They can help you cope by giving you comfort and encouragement. Counseling: Professional counseling can help you cope with situations that interfere with your life and cause stress. Counseling can help you understand and deal with your illness. Your doctor: Find a doctor you trust and feel comfortable with. Be open and honest about your fears and concerns. Your doctor can help you get the right medical treatments, including counseling. Spiritual or Faith groups: They can provide comfort and may be able to help you find counseling or other social support services. Social groups: They can help you meet new people and get involved in activities you enjoy. Community support groups: In a support group, you can talk to others who have dealt with the same problems or illness as you. You can encourage one another and learn ways to cope with tough emotions. How to find a support group  Ask your doctor, counselor, or other health professional for suggestions. Contact your local Sikh, Bahai, Catholic, or other Faith group. Ask your family and friends. Ask people who have the same health concerns. Go online. Forums and blogs let you read messages from others and leave your own messages. You can exchange stories, vent your frustrations, and ask and answer questions. Contact a city, state, or national group that provides support for your health concerns. Your library or community center may have a list of these groups. Or you can look for information online. Look for a support group that works for you. Ask yourself if you prefer structure and would like a , or if you would like a less formal group.  Do you prefer face-to-face meetings? Or do you feel more secure in online chat rooms or forums? Supportive relationships  A supportive relationship includes emotional support such as love, trust, and understanding, as well as advice and concrete help, such as help managing your time. Reach out to others  Family and friends can help you. Ask them to:  Listen to you and give you encouragement. This can keep you from feeling hopeless or alone. Help with small daily tasks or with bigger problems. A helping hand can keep you from feeling overwhelmed. Help you manage a health problem. For example, ask them to go to doctor visits with you. Your loved ones can offer support by being involved in your medical care. Respect your relationships  A good relationship is also a two-way street. You count on help from others, but they also count on you. Know your friends' limits. You don't have to see or call your friends every day. If you are going through a rough patch, ask friends if you can contact them outside of the usual boundaries. Don't always complain or talk about yourself. Know when it's time to stop talking and listen or just enjoy your friend's company. Know that good friends can be a bad influence. For example, if a friend encourages you to drink when you know it will harm you, you may want to end the friendship. Where can you learn more? Go to http://www.woods.com/ and enter G092 to learn more about \"Learning About Emotional Support. \"  Current as of: February 9, 2022               Content Version: 13.5  © 2006-2022 [employer]. Care instructions adapted under license by [facility] ([facility]). If you have questions about a medical condition or this instruction, always ask your healthcare professional. [place] [address] any warranty or liability for your use of this information. Learning About Stress  What is stress?      Stress is what you feel when you have to handle more than you are used to. Stress is a fact of life for most people, and it affects everyone differently. What causes stress for you may not be stressful for someone else. A lot of things can cause stress. You may feel stress when you go on a job interview, take a test, or run a race. This kind of short-term stress is normal and even useful. It can help you if you need to work hard or react quickly. For example, stress can help you finish an important job on time. Stress also can last a long time. Long-term stress is caused by stressful situations or events. Examples of long-term stress include long-term health problems, ongoing problems at work, or conflicts in your family. Long-term stress can harm your health. How does stress affect your health? When you are stressed, your body responds as though you are in danger. It makes hormones that speed up your heart, make you breathe faster, and give you a burst of energy. This is called the fight-or-flight stress response. If the stress is over quickly, your body goes back to normal and no harm is done. But if stress happens too often or lasts too long, it can have bad effects. Long-term stress can make you more likely to get sick, and it can make symptoms of some diseases worse. If you tense up when you are stressed, you may develop neck, shoulder, or low back pain. Stress is linked to high blood pressure and heart disease. Stress also harms your emotional health. It can make you osman, tense, or depressed. Your relationships may suffer, and you may not do well at work or school. What can you do to manage stress? How to relax your mind   Write. It may help to write about things that are bothering you. This helps you find out how much stress you feel and what is causing it. When you know this, you can find better ways to cope. Let your feelings out. Talk, laugh, cry, and express anger when you need to.  Talking with friends, family, a counselor, or a member of the clergy about your feelings is a healthy way to relieve stress. Do something you enjoy. For example, listen to music or go to a movie. Practice your hobby or do volunteer work. Meditate. This can help you relax, because you are not worrying about what happened before or what may happen in the future. Do guided imagery. Imagine yourself in any setting that helps you feel calm. You can use audiotapes, books, or a teacher to guide you. How to relax your body   Do something active. Exercise or activity can help reduce stress. Walking is a great way to get started. Even everyday activities such as housecleaning or yard work can help. Do breathing exercises. For example:  From a standing position, bend forward from the waist with your knees slightly bent. Let your arms dangle close to the floor. Breathe in slowly and deeply as you return to a standing position. Roll up slowly and lift your head last.  Hold your breath for just a few seconds in the standing position. Breathe out slowly and bend forward from the waist.  Try yoga or corinne chi. These techniques combine exercise and meditation. You may need some training at first to learn them. What can you do to prevent stress? Manage your time. This helps you find time to do the things you want and need to do. Get enough sleep. Your body recovers from the stresses of the day while you are sleeping. Get support. Your family, friends, and community can make a difference in how you experience stress. Where can you learn more? Go to http://www.galloway.com/ and enter N032 to learn more about \"Learning About Stress. \"  Current as of: October 6, 2021               Content Version: 13.5  © 9872-5534 [employer]. Care instructions adapted under license by [facility] [place] ([facility]).  If you have questions about a medical condition or this instruction, always ask your healthcare professional. [address] any warranty or liability for your use of this information. Learning About Being Active as an Older Adult  Why is being active important as you get older? Being active is one of the best things you can do for your health. And it's never too late to start. Being active--or getting active, if you aren't already--has definite benefits. It can:  Give you more energy,  Keep your mind sharp. Improve balance to reduce your risk of falls. Help you manage chronic illness with fewer medicines. No matter how old you are, how fit you are, or what health problems you have, there is a form of activity that will work for you. And the more physical activity you can do, the better your overall health will be. What kinds of activity can help you stay healthy? Being more active will make your daily activities easier. Physical activity includes planned exercise and things you do in daily life. There are four types of activity:  Aerobic. Doing aerobic activity makes your heart and lungs strong. Includes walking, dancing, and gardening. Aim for at least 2½ hours spread throughout the week. It improves your energy and can help you sleep better. Muscle-strengthening. This type of activity can help maintain muscle and strengthen bones. Includes climbing stairs, using resistance bands, and lifting or carrying heavy loads. Aim for at least twice a week. It can help protect the knees and other joints. Stretching. Stretching gives you better range of motion in joints and muscles. Includes upper arm stretches, calf stretches, and gentle yoga. Aim for at least twice a week, preferably after your muscles are warmed up from other activities. It can help you function better in daily life. Balancing. This helps you stay coordinated and have good posture. Includes heel-to-toe walking, corinne chi, and certain types of yoga. Aim for at least 3 days a week. It can reduce your risk of falling.   Even if you have a hard time meeting the recommendations, it's better to be more active than less active. All activity done in each category counts toward your weekly total. You'd be surprised how daily things like carrying groceries, keeping up with grandchildren, and taking the stairs can add up. What keeps you from being active? If you've had a hard time being more active, you're not alone. Maybe you remember being able to do more. Or maybe you've never thought of yourself as being active. It's frustrating when you can't do the things you want. Being more active can help. What's holding you back? Getting started. Have a goal, but break it into easy tasks. Small steps build into big accomplishments. Staying motivated. If you feel like skipping your activity, remember your goal. Maybe you want to move better and stay independent. Every activity gets you one step closer. Not feeling your best.  Start with 5 minutes of an activity you enjoy. Prove to yourself you can do it. As you get comfortable, increase your time. You may not be where you want to be. But you're in the process of getting there. Everyone starts somewhere. How can you find safe ways to stay active? Talk with your doctor about any physical challenges you're facing. Make a plan with your doctor if you have a health problem or aren't sure how to get started with activity. If you're already active, ask your doctor if there is anything you should change to stay safe as your body and health change. If you tend to feel dizzy after you take medicine, avoid activity at that time. Try being active before you take your medicine. This will reduce your risk of falls. If you plan to be active at home, make sure to clear your space before you get started. Remove things like TV cords, coffee tables, and throw rugs. It's safest to have plenty of space to move freely. The key to getting more active is to take it slow and steady. Try to improve only a little bit at a time.  Pick just one area to improve on at first. And if an activity hurts, stop and talk to your doctor. Where can you learn more? Go to http://www.galloway.com/ and enter P600 to learn more about \"Learning About Being Active as an Older Adult. \"  Current as of: October 10, 2022               Content Version: 13.5  © 6788-3149 [employer]. Care instructions adapted under license by [facility] [place] ([facility]). If you have questions about a medical condition or this instruction, always ask your healthcare professional. [address] any warranty or liability for your use of this information. Hearing Loss: Care Instructions  Overview     Hearing loss is a sudden or slow decrease in how well you hear. It can range from mild to severe. Permanent hearing loss can occur with aging. It also can happen when you are exposed long-term to loud noise. Examples include listening to loud music, riding motorcycles, or being around other loud machines. Hearing loss can affect your work and home life. It can make you feel lonely or depressed. You may feel that you have lost your independence. But hearing aids and other devices can help you hear better and feel connected to others. Follow-up care is a key part of your treatment and safety. Be sure to make and go to all appointments, and call your doctor if you are having problems. It's also a good idea to know your test results and keep a list of the medicines you take. How can you care for yourself at home? Avoid loud noises whenever possible. This helps keep your hearing from getting worse. Always wear hearing protection around loud noises. Wear a hearing aid as directed. See a professional who can help you pick a hearing aid that fits you. Have hearing tests as your doctor suggests. They can show whether your hearing has changed. Your hearing aid may need to be adjusted. Use other devices as needed.  These may include:  Telephone amplifiers and hearing aids that can connect to a television, stereo, radio, or microphone. Devices that use lights or vibrations. These alert you to the doorbell, a ringing telephone, or a baby monitor. Television closed-captioning. This shows the words at the bottom of the screen. Most new TVs can do this. TTY (text telephone). This lets you type messages back and forth on the telephone instead of talking or listening. These devices are also called TDD. When messages are typed on the keyboard, they are sent over the phone line to a receiving TTY. The message is shown on a monitor. Use text messaging, social media, and email if it is hard for you to communicate by telephone. Try to learn a listening technique called speechreading. It is not lipreading. You pay attention to people's gestures, expressions, posture, and tone of voice. These clues can help you understand what a person is saying. Face the person you are talking to, and have them face you. Make sure the lighting is good. You need to see the other person's face clearly. Think about counseling if you need help to adjust to your hearing loss. When should you call for help? Watch closely for changes in your health, and be sure to contact your doctor if:    You think your hearing is getting worse.     You have new symptoms, such as dizziness or nausea. Where can you learn more? Go to http://www.galloway.com/ and enter R798 to learn more about \"Hearing Loss: Care Instructions. \"  Current as of: May 4, 2022               Content Version: 13.5  © 2006-2022 Healthwise, Incorporated. Care instructions adapted under license by [facility] ([facility]). If you have questions about a medical condition or this instruction, always ask your healthcare professional. [place] [address] any warranty or liability for your use of this information. Learning About Vision Tests  What are vision tests?      The four most common vision tests are visual acuity tests, refraction, visual field tests, and color vision tests. Visual acuity (sharpness) tests  These tests are used: To see if you need glasses or contact lenses. To monitor an eye problem. To check an eye injury. Visual acuity tests are done as part of routine exams. You may also have this test when you get your 's license or apply for some types of jobs. Visual field tests  These tests are used: To check for vision loss in any area of your range of vision. To screen for certain eye diseases. To look for nerve damage after a stroke, head injury, or other problem that could reduce blood flow to the brain. Refraction and color tests  A refraction test is done to find the right prescription for glasses and contact lenses. A color vision test is done to check for color blindness. Color vision is often tested as part of a routine exam. You may also have this test when you apply for a job where recognizing different colors is important, such as , electronics, or the [employer]. How are vision tests done? Visual acuity test   You cover one eye at a time. You read aloud from a wall chart across the room. You read aloud from a small card that you hold in your hand. Refraction   You look into a special device. The device puts lenses of different strengths in front of each eye to see how strong your glasses or contact lenses need to be. Visual field tests   Your doctor may have you look through special machines. Or your doctor may simply have you stare straight ahead while they move a finger into and out of your field of vision. Color vision test   You look at pieces of printed test patterns in various colors. You say what number or symbol you see. Your doctor may have you trace the number or symbol using a pointer. How do these tests feel?   There is very little chance of having a problem from this test. If dilating drops are used for a vision test, they may make the eyes sting and cause a medicine taste in the mouth. Follow-up care is a key part of your treatment and safety. Be sure to make and go to all appointments, and call your doctor if you are having problems. It's also a good idea to know your test results and keep a list of the medicines you take. Where can you learn more? Go to http://www.galloway.com/ and enter G551 to learn more about \"Learning About Vision Tests. \"  Current as of: October 12, 2022               Content Version: 13.5  © 2006-2022 [employer]. Care instructions adapted under license by [facility] ([facility]). If you have questions about a medical condition or this instruction, always ask your healthcare professional. [address] any warranty or liability for your use of this information. Learning About Activities of Daily Living  What are activities of daily living? Activities of daily living (ADLs) are the basic self-care tasks you do every day. As you age, and if you have health problems, you may find that it's harder to do these things for yourself. That's when you may need some help. Your doctor uses ADLs to measure how much help you need. Knowing what you can and can't do for yourself is an important first step to getting help. And when you have the help you need, you can stay as independent as possible. Your doctor will want to know if you are able to do tasks such as: Take a bath or shower without help. Go to the bathroom by yourself. Dress and undress without help. Shave, comb your hair, and brush teeth on your own. Get in and out of bed or a chair without help. Feed yourself without help. If you are having trouble doing basic self-care tasks, talk with your doctor. You may want to bring a caregiver or family member who can help the doctor understand your needs and abilities. How will a doctor assess your ADLs? Asking about ADLs is part of a routine health checkup your doctor will likely do as you age.  Your health check might be done in a doctor's office, in your home, or at a hospital. The goal is to find out if you are having any problems that could make your health problems worse or that make it unsafe for you to be on your own. To measure your ADLs, your doctor will ask how hard it is for you to do routine tasks. He or she may also want to know if you have changed the way you do a task because of a health problem. He or she may watch how you:  Walk back and forth. Keep your balance while you stand or walk. Move from sitting to standing or from a bed to a chair. Button or unbutton a shirt or sweater. Remove and put on your shoes. It's normal to feel a little worried or anxious if you find you can't do all the things you used to be able to do. Talking with your doctor about ADLs isn't a test that you either pass or fail. It's just a way to get more information about your health and safety. Follow-up care is a key part of your treatment and safety. Be sure to make and go to all appointments, and call your doctor if you are having problems. It's also a good idea to know your test results and keep a list of the medicines you take. Current as of: October 6, 2021               Content Version: 13.5  © 2006-2022 Healthwise, Incorporated. Care instructions adapted under license by [facility] ([facility]). If you have questions about a medical condition or this instruction, always ask your healthcare professional. [place] [address] any warranty or liability for your use of this information. Advance Directives: Care Instructions  Overview  An advance directive is a legal way to state your wishes at the end of your life. It tells your family and your doctor what to do if you can't say what you want. There are two main types of advance directives. You can change them any time your wishes change. Living will. This form tells your family and your doctor your wishes about life support and other treatment.  The form is also called a declaration. Medical power of . This form lets you name a person to make treatment decisions for you when you can't speak for yourself. This person is called a health care agent (health care proxy, health care surrogate). The form is also called a durable power of  for health care. If you do not have an advance directive, decisions about your medical care may be made by a family member, or by a doctor or a  who doesn't know you. It may help to think of an advance directive as a gift to the people who care for you. If you have one, they won't have to make tough decisions by themselves. For more information, including forms for your state, see the [address] website (www.caringinfo.org/planning/advance-directives/). Follow-up care is a key part of your treatment and safety. Be sure to make and go to all appointments, and call your doctor if you are having problems. It's also a good idea to know your test results and keep a list of the medicines you take. What should you include in an advance directive? Many states have a unique advance directive form. (It may ask you to address specific issues.) Or you might use a universal form that's approved by many states. If your form doesn't tell you what to address, it may be hard to know what to include in your advance directive. Use the questions below to help you get started. Who do you want to make decisions about your medical care if you are not able to? What life-support measures do you want if you have a serious illness that gets worse over time or can't be cured? What are you most afraid of that might happen? (Maybe you're afraid of having pain, losing your independence, or being kept alive by machines.)  Where would you prefer to die? (Your home? A hospital? A nursing home?)  Do you want to donate your organs when you die? Do you want certain Baptism practices performed before you die?   When should you call for help?  Be sure to contact your doctor if you have any questions. Where can you learn more? Go to http://www.ViViFi.com/ and enter R264 to learn more about \"Advance Directives: Care Instructions. \"  Current as of: June 16, 2022               Content Version: 13.5  © 6912-4020 Healthwise, [employer]. Care instructions adapted under license by [facility] ([facility]). If you have questions about a medical condition or this instruction, always ask your healthcare professional. [place] [address] any warranty or liability for your use of this information. Starting a Weight Loss Plan: Care Instructions  Overview     If you're thinking about losing weight, it can be hard to know where to start. Your doctor can help you set up a weight loss plan that best meets your needs. You may want to take a class on nutrition or exercise, or you could join a weight loss support group. If you have questions about how to make changes to your eating or exercise habits, ask your doctor about seeing a registered dietitian or an exercise specialist.  It can be a big challenge to lose weight. But you don't have to make huge changes at once. Make small changes, and stick with them. When those changes become habit, add a few more changes. If you don't think you're ready to make changes right now, try to pick a date in the future. Make an appointment to see your doctor to discuss whether the time is right for you to start a plan. Follow-up care is a key part of your treatment and safety. Be sure to make and go to all appointments, and call your doctor if you are having problems. It's also a good idea to know your test results and keep a list of the medicines you take. How can you care for yourself at home? Set realistic goals. Many people expect to lose much more weight than is likely. A weight loss of 5% to 10% of your body weight may be enough to improve your health.   Get family and friends involved to provide support. Talk to them about why you are trying to lose weight, and ask them to help. They can help by participating in exercise and having meals with you, even if they may be eating something different. Find what works best for you. If you do not have time or do not like to cook, a program that offers meal replacement bars or shakes may be better for you. Or if you like to prepare meals, finding a plan that includes daily menus and recipes may be best.  Ask your doctor about other health professionals who can help you achieve your weight loss goals. A dietitian can help you make healthy changes in your diet. An exercise specialist or  can help you develop a safe and effective exercise program.  A counselor or psychiatrist can help you cope with issues such as depression, anxiety, or family problems that can make it hard to focus on weight loss. Consider joining a support group for people who are trying to lose weight. Your doctor can suggest groups in your area. Where can you learn more? Go to http://www.woods.com/ and enter U357 to learn more about \"Starting a Weight Loss Plan: Care Instructions. \"  Current as of: August 25, 2022               Content Version: 13.5  © 7826-7633 [employer]. Care instructions adapted under license by [facility] [place] ([facility]). If you have questions about a medical condition or this instruction, always ask your healthcare professional. [place] [address] any warranty or liability for your use of this information. A Healthy Heart: Care Instructions  Your Care Instructions     Coronary artery disease, also called heart disease, occurs when a substance called plaque builds up in the vessels that supply oxygen-rich blood to your heart muscle. This can narrow the blood vessels and reduce blood flow. A heart attack happens when blood flow is completely blocked.  A high-fat diet, smoking, and other factors increase the risk of heart disease. Your doctor has found that you have a chance of having heart disease. You can do lots of things to keep your heart healthy. It may not be easy, but you can change your diet, exercise more, and quit smoking. These steps really work to lower your chance of heart disease. Follow-up care is a key part of your treatment and safety. Be sure to make and go to all appointments, and call your doctor if you are having problems. It's also a good idea to know your test results and keep a list of the medicines you take. How can you care for yourself at home? Diet    Use less salt when you cook and eat. This helps lower your blood pressure. Taste food before salting. Add only a little salt when you think you need it. With time, your taste buds will adjust to less salt.     Eat fewer snack items, fast foods, canned soups, and other high-salt, high-fat, processed foods.     Read food labels and try to avoid saturated and trans fats. They increase your risk of heart disease by raising cholesterol levels.     Limit the amount of solid fat-butter, margarine, and shortening-you eat. Use olive, peanut, or canola oil when you cook. Bake, broil, and steam foods instead of frying them.     Eat a variety of fruit and vegetables every day. Dark green, deep orange, red, or yellow fruits and vegetables are especially good for you. Examples include spinach, carrots, peaches, and berries.     Foods high in fiber can reduce your cholesterol and provide important vitamins and minerals. High-fiber foods include whole-grain cereals and breads, oatmeal, beans, brown rice, citrus fruits, and apples.     Eat lean proteins. Heart-healthy proteins include seafood, lean meats and poultry, eggs, beans, peas, nuts, seeds, and soy products.     Limit drinks and foods with added sugar. These include candy, desserts, and soda pop. Lifestyle changes    If your doctor recommends it, get more exercise. Walking is a good choice.  Bit by bit, increase the amount you walk every day. Try for at least 30 minutes on most days of the week. You also may want to swim, bike, or do other activities.     Do not smoke. If you need help quitting, talk to your doctor about stop-smoking programs and medicines. These can increase your chances of quitting for good. Quitting smoking may be the most important step you can take to protect your heart. It is never too late to quit.     Limit alcohol to 2 drinks a day for men and 1 drink a day for women. Too much alcohol can cause health problems.     Manage other health problems such as diabetes, high blood pressure, and high cholesterol. If you think you may have a problem with alcohol or drug use, talk to your doctor. Medicines    Take your medicines exactly as prescribed. Call your doctor if you think you are having a problem with your medicine.     If your doctor recommends aspirin, take the amount directed each day. Make sure you take aspirin and not another kind of pain reliever, such as acetaminophen (Tylenol). When should you call for help? Call 911 if you have symptoms of a heart attack. These may include:    Chest pain or pressure, or a strange feeling in the chest.     Sweating.     Shortness of breath.     Pain, pressure, or a strange feeling in the back, neck, jaw, or upper belly or in one or both shoulders or arms.     Lightheadedness or sudden weakness.     A fast or irregular heartbeat. After you call 911, the  may tell you to chew 1 adult-strength or 2 to 4 low-dose aspirin. Wait for an ambulance. Do not try to drive yourself. Watch closely for changes in your health, and be sure to contact your doctor if you have any problems. Where can you learn more? Go to http://www.galloway.com/ and enter F075 to learn more about \"A Healthy Heart: Care Instructions. \"  Current as of: September 7, 2022               Content Version: 13.5  © 2881-7428 Healthwise, [facility].    Care instructions adapted under license by [facility] ([facility]). If you have questions about a medical condition or this instruction, always ask your healthcare professional. [place] [address] any warranty or liability for your use of this information. Personalized Preventive Plan for Zach Springer - 2/27/2023  Medicare offers a range of preventive health benefits. Some of the tests and screenings are paid in full while other may be subject to a deductible, co-insurance, and/or copay. Some of these benefits include a comprehensive review of your medical history including lifestyle, illnesses that may run in your family, and various assessments and screenings as appropriate. After reviewing your medical record and screening and assessments performed today your provider may have ordered immunizations, labs, imaging, and/or referrals for you. A list of these orders (if applicable) as well as your Preventive Care list are included within your After Visit Summary for your review. Other Preventive Recommendations:    A preventive eye exam performed by an eye specialist is recommended every 1-2 years to screen for glaucoma; cataracts, macular degeneration, and other eye disorders. A preventive dental visit is recommended every 6 months. Try to get at least 150 minutes of exercise per week or 10,000 steps per day on a pedometer . Order or download the FREE \"Exercise & Physical Activity: Your Everyday Guide\" from [employer]. Call 8-446.708.2002 or search [employer] online. You need 5705-9072 mg of calcium and 2658-1998 IU of vitamin D per day. It is possible to meet your calcium requirement with diet alone, but a vitamin D supplement is usually necessary to meet this goal.  When exposed to the sun, use a sunscreen that protects against both UVA and UVB radiation with an SPF of 30 or greater.  Reapply every 2 to 3 hours or after sweating, drying off with a towel, or swimming. Always wear a seat belt when traveling in a car. Always wear a helmet when riding a bicycle or motorcycle.

## 2023-02-28 ENCOUNTER — OFFICE VISIT (OUTPATIENT)
Dept: OBSTETRICS AND GYNECOLOGY | Facility: CLINIC | Age: 83
End: 2023-02-28
Payer: MEDICARE

## 2023-02-28 VITALS
BODY MASS INDEX: 36.44 KG/M2 | SYSTOLIC BLOOD PRESSURE: 128 MMHG | WEIGHT: 198 LBS | HEIGHT: 62 IN | DIASTOLIC BLOOD PRESSURE: 74 MMHG

## 2023-02-28 DIAGNOSIS — F33.1 MODERATE EPISODE OF RECURRENT MAJOR DEPRESSIVE DISORDER: ICD-10-CM

## 2023-02-28 DIAGNOSIS — F41.9 ANXIETY: ICD-10-CM

## 2023-02-28 DIAGNOSIS — F41.1 GAD (GENERALIZED ANXIETY DISORDER): ICD-10-CM

## 2023-02-28 PROCEDURE — 99213 OFFICE O/P EST LOW 20 MIN: CPT | Performed by: OBSTETRICS & GYNECOLOGY

## 2023-02-28 RX ORDER — ALPRAZOLAM 1 MG/1
1 TABLET ORAL 3 TIMES DAILY PRN
Qty: 90 TABLET | Refills: 2 | Status: SHIPPED | OUTPATIENT
Start: 2023-02-28

## 2023-02-28 RX ORDER — DICLOFENAC SODIUM 75 MG/1
1 TABLET, DELAYED RELEASE ORAL 2 TIMES DAILY
COMMUNITY
Start: 2023-02-15 | End: 2023-02-28 | Stop reason: SDUPTHER

## 2023-02-28 RX ORDER — IPRATROPIUM BROMIDE 42 UG/1
2 SPRAY, METERED NASAL
COMMUNITY
Start: 2023-02-06 | End: 2023-02-28 | Stop reason: SDUPTHER

## 2023-02-28 RX ORDER — VENLAFAXINE 25 MG/1
25 TABLET ORAL DAILY
Qty: 90 TABLET | Refills: 3 | Status: SHIPPED | OUTPATIENT
Start: 2023-02-28

## 2023-02-28 RX ORDER — SOLIFENACIN SUCCINATE 10 MG/1
1 TABLET, FILM COATED ORAL DAILY
Qty: 30 TABLET | Refills: 11 | COMMUNITY
Start: 2023-02-27 | End: 2024-02-28

## 2023-02-28 RX ORDER — SERTRALINE HYDROCHLORIDE 25 MG/1
25 TABLET, FILM COATED ORAL DAILY
Qty: 90 TABLET | Refills: 3 | Status: SHIPPED | OUTPATIENT
Start: 2023-02-28 | End: 2024-02-28

## 2023-02-28 NOTE — PROGRESS NOTES
"Subjective   Chief Complaint   Patient presents with   • Med Refill     Patient is here for a refill of her xanax  patient states she is having some trouble with internal hemorrhoids. Patient has an appointment with surgeon next week.       Jennifer Gomes is a 82 y.o. year old .  No LMP recorded (lmp unknown). Patient has had a hysterectomy.  She presents to be seen for follow-up of anxiety.  She reports that her anxiety is \"not good\" lately.  When specifically asked, she reports that she is having a lot of problems with hemorrhoids recently and is scheduled to see Dr. Chavez.    Depression also well-controlled with zoloft and effexor -patient states that she finds the problem to \"be just unrelenting\".  Patient describes chronic pain and GI issues that require hourly bathroom trips - between the two, the patient feels like they rule her life.       The following portions of the patient's history were reviewed and updated as appropriate:current medications and allergies    Social History    Tobacco Use      Smoking status: Never      Smokeless tobacco: Never    Review of Systems   Constitutional: Negative for activity change and unexpected weight change.   Respiratory: Negative for shortness of breath.    Cardiovascular: Negative for chest pain.   Gastrointestinal: Positive for abdominal pain (intermittent, every day at some point, takes gas-x) and constipation.        + fecal incontinence   Genitourinary: Negative for difficulty urinating and enuresis (better with myrbetriq).   Musculoskeletal: Positive for arthralgias and back pain.        Patient has a lot of chronic pain, which limits her mobility   Psychiatric/Behavioral: Positive for dysphoric mood (stable on zoloft and effexor.  Not currently feeling depressed) and sleep disturbance (significant.  Pt takes 3 mg of xanax most nights, divided into two doses, in order to sleep). The patient is nervous/anxious (pt takes xanax prn during the day, but " "reports that is rare).          Objective   /74   Ht 157.5 cm (62\")   Wt 89.8 kg (198 lb)   LMP  (LMP Unknown)   BMI 36.21 kg/m²     Physical Exam  Vitals and nursing note reviewed.   Constitutional:       General: She is not in acute distress.     Appearance: She is well-developed. She is obese.   HENT:      Head: Normocephalic and atraumatic.   Neck:      Thyroid: No thyromegaly.   Pulmonary:      Effort: Pulmonary effort is normal.   Abdominal:      General: There is no distension.      Palpations: Abdomen is soft.      Tenderness: There is no abdominal tenderness.   Musculoskeletal:      Cervical back: Normal range of motion.      Comments: Patient in wheelchair today   Skin:     General: Skin is warm and dry.      Comments: Skin on legs much healthier now, although still with discoloration from poor circulation.  Skin intact   Neurological:      Mental Status: She is alert and oriented to person, place, and time.   Psychiatric:         Behavior: Behavior normal.         Judgment: Judgment normal.         Lab Review   No data reviewed    Imaging   No data reviewed     Assessment & Plan    Diagnoses and all orders for this visit:  1. Anxiety: Patient sometimes takes all 3 mg of Xanax every evening to get sleep. Cautioned patient to take medication as prescribed, unless she is slowly weaning.  We have again discussed weaning.  Patient reports she cannot sleep due to chronic pain.  -     ALPRAZolam (XANAX) 1 MG tablet; Take 1 tablet by mouth 3 (Three) Times a Day As Needed for Anxiety.  Dispense: 90 tablet; Refill: 2    2. Moderate episode of recurrent major depressive disorder (HCC): currently well-controlled, although patient describes as unrelenting    3. Morbidly obese (HCC): Patient unable to exercise due to difficulty ambulating    4. Adenocarcinoma of endometrium (HCC)  Comments:  s/p hysterectomy wtih Numnum in 2017    Patient has not had a mammogram in several years, and not had a bone density " test since prior to that.  I have tried to talk her into both screening exams, but she says she is not worried about breast cancer or bone density.  Patient reports to be doing frequent SBE.    This note was electronically signed.    Shasta Madrigal MD  February 28, 2023  14:57 CST    Total time spent today with Jennifer  was 20-29 minutes (level 3).  Greater than 50% of the time was spent coordinating care, answering her questions and counseling regarding pathophysiology of her presenting problem along with plans for any diagnositc work-up and treatment.

## 2023-03-06 ENCOUNTER — PATIENT ROUNDING (BHMG ONLY) (OUTPATIENT)
Dept: SURGERY | Facility: CLINIC | Age: 83
End: 2023-03-06
Payer: MEDICARE

## 2023-03-06 ENCOUNTER — OFFICE VISIT (OUTPATIENT)
Dept: SURGERY | Facility: CLINIC | Age: 83
End: 2023-03-06
Payer: MEDICARE

## 2023-03-06 VITALS
SYSTOLIC BLOOD PRESSURE: 130 MMHG | OXYGEN SATURATION: 97 % | BODY MASS INDEX: 36.43 KG/M2 | DIASTOLIC BLOOD PRESSURE: 76 MMHG | HEART RATE: 73 BPM | WEIGHT: 197.97 LBS | HEIGHT: 62 IN

## 2023-03-06 DIAGNOSIS — K64.4 EXTERNAL HEMORRHOIDS: ICD-10-CM

## 2023-03-06 DIAGNOSIS — K64.2 GRADE III INTERNAL HEMORRHOIDS: Primary | ICD-10-CM

## 2023-03-06 DIAGNOSIS — E66.09 CLASS 2 OBESITY DUE TO EXCESS CALORIES WITH BODY MASS INDEX (BMI) OF 36.0 TO 36.9 IN ADULT, UNSPECIFIED WHETHER SERIOUS COMORBIDITY PRESENT: ICD-10-CM

## 2023-03-06 PROCEDURE — 1159F MED LIST DOCD IN RCRD: CPT | Performed by: STUDENT IN AN ORGANIZED HEALTH CARE EDUCATION/TRAINING PROGRAM

## 2023-03-06 PROCEDURE — 1160F RVW MEDS BY RX/DR IN RCRD: CPT | Performed by: STUDENT IN AN ORGANIZED HEALTH CARE EDUCATION/TRAINING PROGRAM

## 2023-03-06 PROCEDURE — 3078F DIAST BP <80 MM HG: CPT | Performed by: STUDENT IN AN ORGANIZED HEALTH CARE EDUCATION/TRAINING PROGRAM

## 2023-03-06 PROCEDURE — 3075F SYST BP GE 130 - 139MM HG: CPT | Performed by: STUDENT IN AN ORGANIZED HEALTH CARE EDUCATION/TRAINING PROGRAM

## 2023-03-06 PROCEDURE — 99204 OFFICE O/P NEW MOD 45 MIN: CPT | Performed by: STUDENT IN AN ORGANIZED HEALTH CARE EDUCATION/TRAINING PROGRAM

## 2023-03-06 RX ORDER — ASPIRIN 325 MG
325 TABLET ORAL DAILY
COMMUNITY

## 2023-03-06 RX ORDER — HEPARIN SODIUM 5000 [USP'U]/ML
5000 INJECTION, SOLUTION INTRAVENOUS; SUBCUTANEOUS ONCE
Status: CANCELLED | OUTPATIENT
Start: 2023-03-06 | End: 2023-03-06

## 2023-03-06 RX ORDER — METRONIDAZOLE 500 MG/100ML
500 INJECTION, SOLUTION INTRAVENOUS ONCE
Status: CANCELLED | OUTPATIENT
Start: 2023-03-06 | End: 2023-03-06

## 2023-03-06 NOTE — H&P (VIEW-ONLY)
Office New Patient History and Physical:     Referring Provider: Daphne Hussein APRN    Chief Complaint   Patient presents with   • Hemorrhoids     Mrs. Gomes is here for a consult on hemorrhoids.        Subjective .     History of present illness:  Jennifer Gomes is a 82 y.o. female who presented to her gynecologist with a rectal prolapse vs hemorrhoid x 1 year - 2 years. The pain is worsening. She denies rectal bleeding. The pain is worse with sitting. There is protrusion of tissue but sometimes it feels less full. She thinks this is a prolapse not a hemorrhoid. She has never had intervention on this before.    Her BMI is 36. She is not on blood thinners. She is a non-smoker.     History  Past Medical History:   Diagnosis Date   • Anxiety    • Arthritis    • Cancer (HCC)     uterine   • Chronic pain    • Depression    • Disease of thyroid gland    • Fibromyalgia    • Headache    • Hyperlipidemia    • Hypertension    • Incontinence    • Insomnia    • Leg pain    • Lumbar stenosis    • Peptic ulcer    • Restless legs    • Vaginal bleeding    ,   Past Surgical History:   Procedure Laterality Date   • BLADDER REPAIR  2011    MESH HAD TO BE REMOVED IN 2013   • BREAST BIOPSY Right 2017    benign   • BREAST CYST EXCISION Left 1982   • CARDIAC CATHETERIZATION     • CARPAL TUNNEL RELEASE     • CATARACT EXTRACTION W/ INTRAOCULAR LENS  IMPLANT, BILATERAL     • COLONOSCOPY     • COLONOSCOPY N/A 10/1/2021    Procedure: COLONOSCOPY WITH ANESTHESIA;  Surgeon: Tom Velasco DO;  Location: [facility] ENDOSCOPY;  Service: Gastroenterology;  Laterality: N/A;  pre: change in bowel habits  post: diverticulosis. hemorrhoids.   Olivia Mora APRN       • CYSTECTOMY     • D & C HYSTEROSCOPY N/A 11/6/2017    Procedure: DILATATION AND CURETTAGE HYSTEROSCOPY;  Surgeon: Shasta Madrigal MD;  Location: [facility] OR;  Service:    • DILATION AND CURETTAGE, DIAGNOSTIC / THERAPEUTIC  2008   • ENDOSCOPY  09/23/2010    Short segment of  Arriola's,Moderate chroninc esophagogastritis and negative H.pylori   • ENDOSCOPY N/A 9/25/2017    Procedure: ESOPHAGOGASTRODUODENOSCOPY WITH ANESTHESIA;  Surgeon: Tom Velasco DO;  Location:  [facility] ENDOSCOPY;  Service:    • HYSTERECTOMY  12/20/2017   • ORIF TIBIA/FIBULA FRACTURES Left 2000   • TRANSVAGINAL TAPING SUSPENSION N/A 11/6/2017    Procedure: VAGINAL MESH REVISION;  Surgeon: Shasta Madrigal MD;  Location:  [facility] OR;  Service:    • VAGINAL MESH REVISION  2013   ,   Family History   Problem Relation Age of Onset   • Diabetes Mother    • Multiple myeloma Mother    • Stroke Father    • Diabetes Sister    • Prostate cancer Brother    • Lymphoma Brother         NHL   • Ovarian cancer Paternal Aunt    • Cancer Paternal Grandmother         metastatic   • Lung cancer Paternal Grandfather    • Colon cancer Neg Hx    • Esophageal cancer Neg Hx    • Breast cancer Neg Hx    ,   Social History     Tobacco Use   • Smoking status: Never   • Smokeless tobacco: Never   Vaping Use   • Vaping Use: Never used   Substance Use Topics   • Alcohol use: Yes     Comment: occasional   • Drug use: No   , (Not in a hospital admission)   and Allergies:  Ropinirole hcl, Codeine, Definity [perflutren lipid microsphere], Ambien [zolpidem], Eszopiclone, Pregabalin, and Tizanidine    Current Outpatient Medications:   •  ALPRAZolam (XANAX) 1 MG tablet, Take 1 tablet by mouth 3 (Three) Times a Day As Needed for Anxiety or Sleep., Disp: 90 tablet, Rfl: 2  •  aspirin 325 MG tablet, Take 1 tablet by mouth Daily., Disp: , Rfl:   •  atorvastatin (LIPITOR) 40 MG tablet, Take 1 tablet by mouth Daily., Disp: , Rfl:   •  bumetanide (BUMEX) 1 MG tablet, Take 1 tablet by mouth Daily., Disp: , Rfl:   •  butalbital-acetaminophen-caffeine (FIORICET, ESGIC) -40 MG per tablet, Take 1 tablet by mouth 2 (Two) Times a Day As Needed for Headache., Disp: , Rfl:   •  carvedilol (COREG) 12.5 MG tablet, Take 1 tablet by mouth 2 (Two) Times a Day With  "Meals., Disp: , Rfl:   •  coenzyme Q10 100 MG capsule, Take 1 capsule by mouth Daily., Disp: , Rfl:   •  cyclobenzaprine (FLEXERIL) 10 MG tablet, , Disp: , Rfl:   •  diclofenac (VOLTAREN) 75 MG EC tablet, Take 1 tablet by mouth 2 (Two) Times a Day., Disp: , Rfl:   •  diphenhydrAMINE (BENADRYL) 25 mg capsule, Take 1 capsule by mouth Every 6 (Six) Hours As Needed for Itching., Disp: , Rfl:   •  donepezil (ARICEPT) 10 MG tablet, Take 1 tablet by mouth Every Night., Disp: , Rfl:   •  ergocalciferol (ERGOCALCIFEROL) 25199 units capsule, Take 1 capsule by mouth 1 (One) Time Per Week. Saturday, Disp: , Rfl:   •  ipratropium (ATROVENT) 0.06 % nasal spray, 2 sprays into the nostril(s) as directed by provider 4 (Four) Times a Day As Needed for Rhinitis. For drainage, Disp: 45 mL, Rfl: 3  •  ipratropium (ATROVENT) 0.06 % nasal spray, 2 sprays., Disp: , Rfl:   •  LACTASE ENZYME PO, Take 3 tablets by mouth As Needed (takes before dairy products)., Disp: , Rfl:   •  lansoprazole (PREVACID) 30 MG capsule, Take 1 capsule by mouth Every Morning., Disp: , Rfl:   •  levothyroxine (SYNTHROID, LEVOTHROID) 50 MCG tablet, Take 1 tablet by mouth Every Morning., Disp: , Rfl:   •  oxyCODONE (ROXICODONE) 15 MG immediate release tablet, , Disp: , Rfl:   •  polyethylene glycol (MiraLax) 17 g packet, Take 17 g by mouth Daily., Disp: 100 each, Rfl: 3  •  sertraline (ZOLOFT) 25 MG tablet, Take 1 tablet by mouth Daily., Disp: 90 tablet, Rfl: 3  •  solifenacin (VESICARE) 10 MG tablet, Take 1 tablet by mouth Daily., Disp: 30 tablet, Rfl: 11  •  SUMAtriptan (IMITREX) 50 MG tablet, Take 1 tablet by mouth 2 (Two) Times a Day As Needed for Migraine., Disp: 30 tablet, Rfl: 3  •  venlafaxine (EFFEXOR) 25 MG tablet, Take 1 tablet by mouth Daily., Disp: 90 tablet, Rfl: 3    Objective     Vital Signs   /76 (BP Location: Left arm, Patient Position: Sitting, Cuff Size: Adult)   Pulse 73   Ht 157.5 cm (62.01\")   Wt 89.8 kg (197 lb 15.6 oz)   LMP  (LMP " Unknown)   SpO2 97%   BMI 36.20 kg/m²      Physical Exam:  General appearance - alert, well appearing, and in no distress  Mental status - alert, oriented to person, place, and time  Eyes - pupils equal and reactive, extraocular eye movements intact  Neck - supple, no significant adenopathy  Chest - no tachypnea, retractions or cyanosis  Heart - normal rate and regular rhythm  Abdomen - soft, nontender, nondistended, no masses or organomegaly  Rectal - two column internal and external hemorrhoids, external hemorrhoids thrombosed   Neurological - alert, oriented, normal speech, no focal findings or movement disorder noted    Results Review:    The following data was reviewed by: Holly Chavez MD on 03/06/2023:    Progress Notes by Shasta Madrigal MD (02/28/2023 15:00)  Progress Notes by Daphne Hussein APRN (01/16/2023 14:45)    Assessment & Plan       Diagnoses and all orders for this visit:    1. Grade III internal hemorrhoids (Primary)  -     Case Request; Standing  -     CBC and Differential; Future  -     Comprehensive metabolic panel; Future  -     ECG 12 Lead; Future  -     XR chest 1 vw; Future  -     metroNIDAZOLE (FLAGYL) IVPB 500 mg  -     heparin (porcine) 5000 UNIT/ML injection 5,000 Units  -     Case Request    2. External hemorrhoids  -     Case Request; Standing  -     CBC and Differential; Future  -     Comprehensive metabolic panel; Future  -     ECG 12 Lead; Future  -     XR chest 1 vw; Future  -     metroNIDAZOLE (FLAGYL) IVPB 500 mg  -     heparin (porcine) 5000 UNIT/ML injection 5,000 Units  -     Case Request    3. Class 2 obesity due to excess calories with body mass index (BMI) of 36.0 to 36.9 in adult, unspecified whether serious comorbidity present    Other orders  -     Follow Anesthesia Guidelines / Protocol; Future  -     Obtain Informed Consent; Future  -     Provide NPO Instructions to Patient; Future  -     Chlorhexidine Skin Prep; Future  -     Follow Anesthesia Guidelines /  Protocol; Standing  -     Verify / Perform Chlorhexidine Skin Prep; Standing  -     Verify / Perform Chlorhexidine Skin Prep if Indicated (If Not Already Completed); Standing  -     Instructions on coughing, deep breathing, and incentive spirometry.; Standing  -     Oxygen Therapy-; Standing  -     Notify physician (specify); Standing         Jennifer Gomes is a 82 y.o. female with grade III internal hemorrhoids and external hemorrhoids. We discussed the options for management including continued medical management with trying different topical treatments vs. banding of the internal hemorrhoids vs. hemorrhoidectomy. She does not wish to continue medical management as she has failed this. The patient is not interested in banding as this would not address the external hemorrhoids. The patient would like to proceed with hemorrhoidectomy. We discussed the risks of surgery including bleeding, infection, stricture, urinary retention, and hemorrhoid recurrence. the patient understands that hemorrhoids can recur and that they will need to be on a bowel regimen post operatively with stool softeners and laxatives initially. After the patient has healed, they will need to be vigilant about avoiding constipation. The patient understands all of this and would like to proceed. The patient is scheduled for hemorrhoidectomy on 3/21/23. Prework ordered and scheduled to include CBC, CMP, CXR, EKG.     This is a chronic problem with progression. I have reviewed the notes above and ordered pre work. She is at increased risk of perioperative complications 2/2 her elevated BMI of 36.     Class 2 Severe Obesity (BMI >=35 and <=39.9). Obesity-related health conditions include the following: hypertension and dyslipidemias. Obesity is unchanged. BMI is is above average; BMI management plan is completed. We discussed portion control and increasing exercise.      Holly Chavez MD  03/08/23  20:09 CST

## 2023-03-06 NOTE — PROGRESS NOTES
March 6, 2023    Hello, may I speak with Jennifer Gomes?    My name is Paola     I am  with [facility]  [facility]  [address]  [place] [address] [phone].    Before we get started may I verify your date of birth? 1940    I am calling to officially welcome you to our practice and ask about your recent visit. Is this a good time to talk? yes    Tell me about your visit with us. What things went well?  Absolutely love Dr. Chavez!       We're always looking for ways to make our patients' experiences even better. Do you have recommendations on ways we may improve?  no    Overall were you satisfied with your first visit to our practice? yes       I appreciate you taking the time to speak with me today. Is there anything else I can do for you? no      Thank you, and have a great day.

## 2023-03-06 NOTE — PROGRESS NOTES
Office New Patient History and Physical:     Referring Provider: Daphne Hussein APRN    Chief Complaint   Patient presents with   • Hemorrhoids     Mrs. Gomes is here for a consult on hemorrhoids.        Subjective .     History of present illness:  Jennifer Gomes is a 82 y.o. female who presented to her gynecologist with a rectal prolapse vs hemorrhoid x 1 year - 2 years. The pain is worsening. She denies rectal bleeding. The pain is worse with sitting. There is protrusion of tissue but sometimes it feels less full. She thinks this is a prolapse not a hemorrhoid. She has never had intervention on this before.    Her BMI is 36. She is not on blood thinners. She is a non-smoker.     History  Past Medical History:   Diagnosis Date   • Anxiety    • Arthritis    • Cancer (HCC)     uterine   • Chronic pain    • Depression    • Disease of thyroid gland    • Fibromyalgia    • Headache    • Hyperlipidemia    • Hypertension    • Incontinence    • Insomnia    • Leg pain    • Lumbar stenosis    • Peptic ulcer    • Restless legs    • Vaginal bleeding    ,   Past Surgical History:   Procedure Laterality Date   • BLADDER REPAIR  2011    MESH HAD TO BE REMOVED IN 2013   • BREAST BIOPSY Right 2017    benign   • BREAST CYST EXCISION Left 1982   • CARDIAC CATHETERIZATION     • CARPAL TUNNEL RELEASE     • CATARACT EXTRACTION W/ INTRAOCULAR LENS  IMPLANT, BILATERAL     • COLONOSCOPY     • COLONOSCOPY N/A 10/1/2021    Procedure: COLONOSCOPY WITH ANESTHESIA;  Surgeon: Tom Velasco DO;  Location: [facility] ENDOSCOPY;  Service: Gastroenterology;  Laterality: N/A;  pre: change in bowel habits  post: diverticulosis. hemorrhoids.   Olivia Mora APRN       • CYSTECTOMY     • D & C HYSTEROSCOPY N/A 11/6/2017    Procedure: DILATATION AND CURETTAGE HYSTEROSCOPY;  Surgeon: Shasta Madrigal MD;  Location: [facility] OR;  Service:    • DILATION AND CURETTAGE, DIAGNOSTIC / THERAPEUTIC  2008   • ENDOSCOPY  09/23/2010    Short segment of  Arriola's,Moderate chroninc esophagogastritis and negative H.pylori   • ENDOSCOPY N/A 9/25/2017    Procedure: ESOPHAGOGASTRODUODENOSCOPY WITH ANESTHESIA;  Surgeon: Tom Velasco DO;  Location:  [facility] ENDOSCOPY;  Service:    • HYSTERECTOMY  12/20/2017   • ORIF TIBIA/FIBULA FRACTURES Left 2000   • TRANSVAGINAL TAPING SUSPENSION N/A 11/6/2017    Procedure: VAGINAL MESH REVISION;  Surgeon: Shasta Madrigal MD;  Location:  [facility] OR;  Service:    • VAGINAL MESH REVISION  2013   ,   Family History   Problem Relation Age of Onset   • Diabetes Mother    • Multiple myeloma Mother    • Stroke Father    • Diabetes Sister    • Prostate cancer Brother    • Lymphoma Brother         NHL   • Ovarian cancer Paternal Aunt    • Cancer Paternal Grandmother         metastatic   • Lung cancer Paternal Grandfather    • Colon cancer Neg Hx    • Esophageal cancer Neg Hx    • Breast cancer Neg Hx    ,   Social History     Tobacco Use   • Smoking status: Never   • Smokeless tobacco: Never   Vaping Use   • Vaping Use: Never used   Substance Use Topics   • Alcohol use: Yes     Comment: occasional   • Drug use: No   , (Not in a hospital admission)   and Allergies:  Ropinirole hcl, Codeine, Definity [perflutren lipid microsphere], Ambien [zolpidem], Eszopiclone, Pregabalin, and Tizanidine    Current Outpatient Medications:   •  ALPRAZolam (XANAX) 1 MG tablet, Take 1 tablet by mouth 3 (Three) Times a Day As Needed for Anxiety or Sleep., Disp: 90 tablet, Rfl: 2  •  aspirin 325 MG tablet, Take 1 tablet by mouth Daily., Disp: , Rfl:   •  atorvastatin (LIPITOR) 40 MG tablet, Take 1 tablet by mouth Daily., Disp: , Rfl:   •  bumetanide (BUMEX) 1 MG tablet, Take 1 tablet by mouth Daily., Disp: , Rfl:   •  butalbital-acetaminophen-caffeine (FIORICET, ESGIC) -40 MG per tablet, Take 1 tablet by mouth 2 (Two) Times a Day As Needed for Headache., Disp: , Rfl:   •  carvedilol (COREG) 12.5 MG tablet, Take 1 tablet by mouth 2 (Two) Times a Day With  "Meals., Disp: , Rfl:   •  coenzyme Q10 100 MG capsule, Take 1 capsule by mouth Daily., Disp: , Rfl:   •  cyclobenzaprine (FLEXERIL) 10 MG tablet, , Disp: , Rfl:   •  diclofenac (VOLTAREN) 75 MG EC tablet, Take 1 tablet by mouth 2 (Two) Times a Day., Disp: , Rfl:   •  diphenhydrAMINE (BENADRYL) 25 mg capsule, Take 1 capsule by mouth Every 6 (Six) Hours As Needed for Itching., Disp: , Rfl:   •  donepezil (ARICEPT) 10 MG tablet, Take 1 tablet by mouth Every Night., Disp: , Rfl:   •  ergocalciferol (ERGOCALCIFEROL) 68918 units capsule, Take 1 capsule by mouth 1 (One) Time Per Week. Saturday, Disp: , Rfl:   •  ipratropium (ATROVENT) 0.06 % nasal spray, 2 sprays into the nostril(s) as directed by provider 4 (Four) Times a Day As Needed for Rhinitis. For drainage, Disp: 45 mL, Rfl: 3  •  ipratropium (ATROVENT) 0.06 % nasal spray, 2 sprays., Disp: , Rfl:   •  LACTASE ENZYME PO, Take 3 tablets by mouth As Needed (takes before dairy products)., Disp: , Rfl:   •  lansoprazole (PREVACID) 30 MG capsule, Take 1 capsule by mouth Every Morning., Disp: , Rfl:   •  levothyroxine (SYNTHROID, LEVOTHROID) 50 MCG tablet, Take 1 tablet by mouth Every Morning., Disp: , Rfl:   •  oxyCODONE (ROXICODONE) 15 MG immediate release tablet, , Disp: , Rfl:   •  polyethylene glycol (MiraLax) 17 g packet, Take 17 g by mouth Daily., Disp: 100 each, Rfl: 3  •  sertraline (ZOLOFT) 25 MG tablet, Take 1 tablet by mouth Daily., Disp: 90 tablet, Rfl: 3  •  solifenacin (VESICARE) 10 MG tablet, Take 1 tablet by mouth Daily., Disp: 30 tablet, Rfl: 11  •  SUMAtriptan (IMITREX) 50 MG tablet, Take 1 tablet by mouth 2 (Two) Times a Day As Needed for Migraine., Disp: 30 tablet, Rfl: 3  •  venlafaxine (EFFEXOR) 25 MG tablet, Take 1 tablet by mouth Daily., Disp: 90 tablet, Rfl: 3    Objective     Vital Signs   /76 (BP Location: Left arm, Patient Position: Sitting, Cuff Size: Adult)   Pulse 73   Ht 157.5 cm (62.01\")   Wt 89.8 kg (197 lb 15.6 oz)   LMP  (LMP " Unknown)   SpO2 97%   BMI 36.20 kg/m²      Physical Exam:  General appearance - alert, well appearing, and in no distress  Mental status - alert, oriented to person, place, and time  Eyes - pupils equal and reactive, extraocular eye movements intact  Neck - supple, no significant adenopathy  Chest - no tachypnea, retractions or cyanosis  Heart - normal rate and regular rhythm  Abdomen - soft, nontender, nondistended, no masses or organomegaly  Rectal - two column internal and external hemorrhoids, external hemorrhoids thrombosed   Neurological - alert, oriented, normal speech, no focal findings or movement disorder noted    Results Review:    The following data was reviewed by: Holly Chavez MD on 03/06/2023:    Progress Notes by Shasta Madrigal MD (02/28/2023 15:00)  Progress Notes by Daphne Hussein APRN (01/16/2023 14:45)    Assessment & Plan       Diagnoses and all orders for this visit:    1. Grade III internal hemorrhoids (Primary)  -     Case Request; Standing  -     CBC and Differential; Future  -     Comprehensive metabolic panel; Future  -     ECG 12 Lead; Future  -     XR chest 1 vw; Future  -     metroNIDAZOLE (FLAGYL) IVPB 500 mg  -     heparin (porcine) 5000 UNIT/ML injection 5,000 Units  -     Case Request    2. External hemorrhoids  -     Case Request; Standing  -     CBC and Differential; Future  -     Comprehensive metabolic panel; Future  -     ECG 12 Lead; Future  -     XR chest 1 vw; Future  -     metroNIDAZOLE (FLAGYL) IVPB 500 mg  -     heparin (porcine) 5000 UNIT/ML injection 5,000 Units  -     Case Request    3. Class 2 obesity due to excess calories with body mass index (BMI) of 36.0 to 36.9 in adult, unspecified whether serious comorbidity present    Other orders  -     Follow Anesthesia Guidelines / Protocol; Future  -     Obtain Informed Consent; Future  -     Provide NPO Instructions to Patient; Future  -     Chlorhexidine Skin Prep; Future  -     Follow Anesthesia Guidelines /  Protocol; Standing  -     Verify / Perform Chlorhexidine Skin Prep; Standing  -     Verify / Perform Chlorhexidine Skin Prep if Indicated (If Not Already Completed); Standing  -     Instructions on coughing, deep breathing, and incentive spirometry.; Standing  -     Oxygen Therapy-; Standing  -     Notify physician (specify); Standing         Jennifer Gomes is a 82 y.o. female with grade III internal hemorrhoids and external hemorrhoids. We discussed the options for management including continued medical management with trying different topical treatments vs. banding of the internal hemorrhoids vs. hemorrhoidectomy. She does not wish to continue medical management as she has failed this. The patient is not interested in banding as this would not address the external hemorrhoids. The patient would like to proceed with hemorrhoidectomy. We discussed the risks of surgery including bleeding, infection, stricture, urinary retention, and hemorrhoid recurrence. the patient understands that hemorrhoids can recur and that they will need to be on a bowel regimen post operatively with stool softeners and laxatives initially. After the patient has healed, they will need to be vigilant about avoiding constipation. The patient understands all of this and would like to proceed. The patient is scheduled for hemorrhoidectomy on 3/21/23. Prework ordered and scheduled to include CBC, CMP, CXR, EKG.     This is a chronic problem with progression. I have reviewed the notes above and ordered pre work. She is at increased risk of perioperative complications 2/2 her elevated BMI of 36.     Class 2 Severe Obesity (BMI >=35 and <=39.9). Obesity-related health conditions include the following: hypertension and dyslipidemias. Obesity is unchanged. BMI is is above average; BMI management plan is completed. We discussed portion control and increasing exercise.      Holly Chavez MD  03/08/23  20:09 CST

## 2023-03-06 NOTE — PATIENT INSTRUCTIONS

## 2023-03-09 DIAGNOSIS — M54.2 NECK PAIN: ICD-10-CM

## 2023-03-09 NOTE — TELEPHONE ENCOUNTER
Yoel Mcgregor called requesting a refill of the below medication which has been pended for you:     Requested Prescriptions     Pending Prescriptions Disp Refills    cyclobenzaprine (FLEXERIL) 10 MG tablet [Pharmacy Med Name: CYCLOBENZAPRINE HYDROCHLORIDE 10MG TABLET] 30 tablet 0     Sig: TAKE 1 TABLET BY MOUTH 3 TIMES DAILY AS NEEDED FOR MUSCLE SPASMS       Last Appointment Date: Visit date not found  Next Appointment Date: 5/30/2023    Allergies   Allergen Reactions    Ambien [Zolpidem Tartrate]     Codeine     Lyrica [Pregabalin]     Morphine     Requip [Ropinirole Hcl]     Tizanidine      Terrible nightmares

## 2023-03-13 RX ORDER — CYCLOBENZAPRINE HCL 10 MG
TABLET ORAL
Qty: 30 TABLET | Refills: 0 | Status: SHIPPED | OUTPATIENT
Start: 2023-03-13

## 2023-03-14 ENCOUNTER — PRE-ADMISSION TESTING (OUTPATIENT)
Dept: PREADMISSION TESTING | Facility: HOSPITAL | Age: 83
End: 2023-03-14
Payer: MEDICARE

## 2023-03-14 ENCOUNTER — HOSPITAL ENCOUNTER (OUTPATIENT)
Dept: GENERAL RADIOLOGY | Facility: HOSPITAL | Age: 83
Discharge: HOME | End: 2023-03-14
Payer: MEDICARE

## 2023-03-14 VITALS
RESPIRATION RATE: 16 BRPM | HEART RATE: 76 BPM | DIASTOLIC BLOOD PRESSURE: 86 MMHG | HEIGHT: 62 IN | OXYGEN SATURATION: 97 % | SYSTOLIC BLOOD PRESSURE: 164 MMHG | BODY MASS INDEX: 36.31 KG/M2 | WEIGHT: 197.31 LBS

## 2023-03-14 DIAGNOSIS — K64.4 EXTERNAL HEMORRHOIDS: ICD-10-CM

## 2023-03-14 DIAGNOSIS — K64.2 GRADE III INTERNAL HEMORRHOIDS: ICD-10-CM

## 2023-03-14 LAB
ALBUMIN SERPL-MCNC: 4 G/DL (ref 3.5–5.2)
ALBUMIN/GLOB SERPL: 1.5 G/DL
ALP SERPL-CCNC: 72 U/L (ref 39–117)
ALT SERPL W P-5'-P-CCNC: 10 U/L (ref 1–33)
ANION GAP SERPL CALCULATED.3IONS-SCNC: 11 MMOL/L (ref 5–15)
AST SERPL-CCNC: 12 U/L (ref 1–32)
BASOPHILS # BLD AUTO: 0.02 10*3/MM3 (ref 0–0.2)
BASOPHILS NFR BLD AUTO: 0.3 % (ref 0–1.5)
BILIRUB SERPL-MCNC: 0.2 MG/DL (ref 0–1.2)
BUN SERPL-MCNC: 18 MG/DL (ref 8–23)
BUN/CREAT SERPL: 22.2 (ref 7–25)
CALCIUM SPEC-SCNC: 9.1 MG/DL (ref 8.6–10.5)
CHLORIDE SERPL-SCNC: 104 MMOL/L (ref 98–107)
CO2 SERPL-SCNC: 27 MMOL/L (ref 22–29)
CREAT SERPL-MCNC: 0.81 MG/DL (ref 0.57–1)
DEPRECATED RDW RBC AUTO: 43.8 FL (ref 37–54)
EGFRCR SERPLBLD CKD-EPI 2021: 73.5 ML/MIN/1.73
EOSINOPHIL # BLD AUTO: 0.44 10*3/MM3 (ref 0–0.4)
EOSINOPHIL NFR BLD AUTO: 7.2 % (ref 0.3–6.2)
ERYTHROCYTE [DISTWIDTH] IN BLOOD BY AUTOMATED COUNT: 13 % (ref 12.3–15.4)
GLOBULIN UR ELPH-MCNC: 2.6 GM/DL
GLUCOSE SERPL-MCNC: 100 MG/DL (ref 65–99)
HCT VFR BLD AUTO: 35.9 % (ref 34–46.6)
HGB BLD-MCNC: 11.7 G/DL (ref 12–15.9)
IMM GRANULOCYTES # BLD AUTO: 0.02 10*3/MM3 (ref 0–0.05)
IMM GRANULOCYTES NFR BLD AUTO: 0.3 % (ref 0–0.5)
LYMPHOCYTES # BLD AUTO: 1.42 10*3/MM3 (ref 0.7–3.1)
LYMPHOCYTES NFR BLD AUTO: 23.1 % (ref 19.6–45.3)
MCH RBC QN AUTO: 30.3 PG (ref 26.6–33)
MCHC RBC AUTO-ENTMCNC: 32.6 G/DL (ref 31.5–35.7)
MCV RBC AUTO: 93 FL (ref 79–97)
MONOCYTES # BLD AUTO: 0.47 10*3/MM3 (ref 0.1–0.9)
MONOCYTES NFR BLD AUTO: 7.6 % (ref 5–12)
NEUTROPHILS NFR BLD AUTO: 3.78 10*3/MM3 (ref 1.7–7)
NEUTROPHILS NFR BLD AUTO: 61.5 % (ref 42.7–76)
NRBC BLD AUTO-RTO: 0 /100 WBC (ref 0–0.2)
PLATELET # BLD AUTO: 206 10*3/MM3 (ref 140–450)
PMV BLD AUTO: 11.1 FL (ref 6–12)
POTASSIUM SERPL-SCNC: 4.2 MMOL/L (ref 3.5–5.2)
PROT SERPL-MCNC: 6.6 G/DL (ref 6–8.5)
RBC # BLD AUTO: 3.86 10*6/MM3 (ref 3.77–5.28)
SODIUM SERPL-SCNC: 142 MMOL/L (ref 136–145)
WBC NRBC COR # BLD: 6.15 10*3/MM3 (ref 3.4–10.8)

## 2023-03-14 PROCEDURE — 93010 ELECTROCARDIOGRAM REPORT: CPT | Performed by: INTERNAL MEDICINE

## 2023-03-14 PROCEDURE — 93005 ELECTROCARDIOGRAM TRACING: CPT

## 2023-03-14 PROCEDURE — 71045 X-RAY EXAM CHEST 1 VIEW: CPT

## 2023-03-14 PROCEDURE — 85025 COMPLETE CBC W/AUTO DIFF WBC: CPT

## 2023-03-14 PROCEDURE — 80053 COMPREHEN METABOLIC PANEL: CPT

## 2023-03-14 PROCEDURE — 36415 COLL VENOUS BLD VENIPUNCTURE: CPT

## 2023-03-14 NOTE — DISCHARGE INSTRUCTIONS
Before you come to the hospital        Arrival time: AS DIRECTED BY OFFICE     YOU MAY TAKE THE FOLLOWING MEDICATION(S) THE MORNING OF SURGERY WITH A SIP OF WATER: CARVEDILOL, OXYCODONE, XANAX           ALL OTHER HOME MEDICATION CHECK WITH YOUR PHYSICIAN (especially if   you are taking diabetes medicines or blood thinners)          If you were given and instructed to use a germ- killing soap, use as directed the night before surgery and again the morning of surgery or as directed by your surgeon. (Use one-half of the bottle with each shower.)   See attached information for How to Use Chlorhexidine for Bathing if applicable.            Eating and drinking restrictions prior to scheduled arrival time    2 Hours before arrival time STOP   Drinking Clear liquids (water, apple juice-no pulp)     6 Hours before arrival time STOP   Milk or drinks that contain milk, full liquids    6 Hours before arrival time STOP   Light meals or foods, such as toast or cereal    8 Hours before arrival time STOP   Heavy foods, such as meat, fried foods, or fatty foods    (It is extremely important that you follow these guidelines to prevent delay or cancelation of your procedure)     Clear Liquids  Water and flavored water                                                                      Clear Fruit juices, such as cranberry juice and apple juice.  Black coffee (NO cream of any kind, including powdered).  Plain tea  Clear bouillon or broth.  Flavored gelatin.  Soda.  Gatorade or Powerade.  Full liquid examples  Juices that have pulp.  Frozen ice pops that contain fruit pieces.  Coffee with creamer  Milk.  Yogurt.                MANAGING PAIN AFTER SURGERY    We know you are probably wondering what your pain will be like after surgery.  Following surgery it is unrealistic to expect you will not have pain.   Pain is how our bodies let us know that something is wrong or cautions us to be careful.  That said, our goal is to make your pain  tolerable.    Methods we may use to treat your pain include (oral or IV medications, PCAs, epidurals, nerve blocks, etc.)   While some procedures require IV pain medications for a short time after surgery, transitioning to pain medications by mouth allows for better management of pain.   Your nurse will encourage you to take oral pain medications whenever possible.  IV medications work almost immediately, but only last a short while.  Taking medications by mouth allows for a more constant level of medication in your blood stream for a longer period of time.      Once your pain is out of control it is harder to get back under control.  It is important you are aware when your next dose of pain medication is due.  If you are admitted, your nurse may write the time of your next dose on the white board in your room to help you remember.      We are interested in your pain and encourage you to inform us about aggravating factors during your visit.   Many times a simple repositioning every few hours can make a big difference.    If your physician says it is okay, do not let your pain prevent you from getting out of bed. Be sure to call your nurse for assistance prior to getting up so you do not fall.      Before surgery, please decide your tolerable pain goal.  These faces help describe the pain ratings we use on a 0-10 scale.   Be prepared to tell us your goal and whether or not you take pain or anxiety medications at home.          Preparing for Surgery  Preparing for surgery is an important part of your care. It can make things go more smoothly and help you avoid complications. The steps leading up to surgery may vary among hospitals. Follow all instructions given to you by your health care providers. Ask questions if you do not understand something. Talk about any concerns that you have.  Here are some questions to consider asking before your surgery:  If my surgery is not an emergency (is elective), when would be the  best time to have the surgery?  What arrangements do I need to make for work, home, or school?  What will my recovery be like? How long will it be before I can return to normal activities?  Will I need to prepare my home? Will I need to arrange care for me or my children?  Should I expect to have pain after surgery? What are my pain management options? Are there nonmedical options that I can try for pain?  Tell a health care provider about:  Any allergies you have.  All medicines you are taking, including vitamins, herbs, eye drops, creams, and over-the-counter medicines.  Any problems you or family members have had with anesthetic medicines.  Any blood disorders you have.  Any surgeries you have had.  Any medical conditions you have.  Whether you are pregnant or may be pregnant.  What are the risks?  The risks and complications of surgery depend on the specific procedure that you have. Discuss all the risks with your health care providers before your surgery. Ask about common surgical complications, which may include:  Infection.  Bleeding or a need for blood replacement (transfusion).  Allergic reactions to medicines.  Damage to surrounding nerves, tissues, or structures.  A blood clot.  Scarring.  Failure of the surgery to correct the problem.  Follow these instructions before the procedure:  Several days or weeks before your procedure  You may have a physical exam by your primary health care provider to make sure it is safe for you to have surgery.  You may have testing. This may include a chest X-ray, blood and urine tests, electrocardiogram (ECG), or other testing.  Ask your health care provider about:  Changing or stopping your regular medicines. This is especially important if you are taking diabetes medicines or blood thinners.  Taking medicines such as aspirin and ibuprofen. These medicines can thin your blood. Do not take these medicines unless your health care provider tells you to take them.  Taking  over-the-counter medicines, vitamins, herbs, and supplements.  Do not use any products that contain nicotine or tobacco, such as cigarettes and e-cigarettes. If you need help quitting, ask your health care provider.  Avoid alcohol.  Ask your health care provider if there are exercises you can do to prepare for surgery.  Eat a healthy diet.   Plan to have someone take you home from the hospital or clinic.  Plan to have a responsible adult care for you for at least 24 hours after you leave the hospital or clinic. This is important.  The day before your procedure  You may be given antibiotic medicine to take by mouth to help prevent infection. Take it as told by your health care provider.  You may be asked to shower with a germ-killing soap.  Follow instructions from your health care provider about eating and drinking restrictions. This includes gum, mints and hard candy.  Pack comfortable clothes according to your procedure.   The day of your procedure  You may need to take another shower with a germ-killing soap before you leave home in the morning.  With a small sip of water, take only the medicines that you are told to take.  Remove all jewelry including rings.   Leave anything you consider valuable at home except hearing aids if needed.  You do not need to bring your home medications into the hospital.   Do not wear any makeup, nail polish, powder, deodorant, lotion, hair accessories, or anything on your skin or body except your clothes.  If you will be staying in the hospital, bring a case to hold your glasses, contacts, or dentures. You may also want to bring your robe and non-skid footwear.       (Do not use denture adhesives since you will be asked to remove them during  surgery).   If you wear oxygen at home, bring it with you the day of surgery.  If instructed by your health care provider, bring your sleep apnea device with you on the day of your surgery (if this applies to you).  You may want to leave your  suitcase and sleep apnea device in the car until after surgery.   Arrive at the hospital as scheduled.  Bring a friend or family member with you who can help to answer questions and be present while you meet with your health care provider.  At the hospital  When you arrive at the hospital:  Go to registration located at the main entrance of the hospital. You will be registered and given a beeper and a sticker sheet. Take the stickers to the Outpatient nurses desk and place in the black tray. This is to notify staff that you have arrived. Then return to the lobby to wait.   When your beeper lights up and vibrates proceed through the double doors, under the stairs, and a member of the Outpatient Surgery staff will escort you to your preoperative room.  You may have to wear compression sleeves. These help to prevent blood clots and reduce swelling in your legs.  An IV may be inserted into one of your veins.              In the operating room, you may be given one or more of the following:        A medicine to help you relax (sedative).        A medicine to numb the area (local anesthetic).        A medicine to make you fall asleep (general anesthetic).        A medicine that is injected into an area of your body to numb everything below the                      injection site (regional anesthetic).  You may be given an antibiotic through your IV to help prevent infection.  Your surgical site will be marked or identified.    Contact a health care provider if you:  Develop a fever of more than 100.4°F (38°C) or other feelings of illness during the 48 hours before your surgery.  Have symptoms that get worse.  Have questions or concerns about your surgery.  Summary  Preparing for surgery can make the procedure go more smoothly and lower your risk of complications.  Before surgery, make a list of questions and concerns to discuss with your surgeon. Ask about the risks and possible complications.  In the days or weeks before  your surgery, follow all instructions from your health care provider. You may need to stop smoking, avoid alcohol, follow eating restrictions, and change or stop your regular medicines.  Contact your surgeon if you develop a fever or other signs of illness during the few days before your surgery.  This information is not intended to replace advice given to you by your health care provider. Make sure you discuss any questions you have with your health care provider.  Document Revised: 12/21/2018 Document Reviewed: 10/23/2018  Elsevier Patient Education © 2021 [employer]

## 2023-03-17 LAB
QT INTERVAL: 402 MS
QTC INTERVAL: 430 MS

## 2023-03-20 RX ORDER — LANSOPRAZOLE 30 MG/1
CAPSULE, DELAYED RELEASE ORAL
Qty: 30 CAPSULE | Refills: 5 | Status: SHIPPED | OUTPATIENT
Start: 2023-03-20

## 2023-03-20 RX ORDER — CARVEDILOL 12.5 MG/1
TABLET ORAL
Qty: 60 TABLET | Refills: 0 | Status: SHIPPED | OUTPATIENT
Start: 2023-03-20

## 2023-03-20 NOTE — TELEPHONE ENCOUNTER
Patricia Remy called to request a refill on her medication.       Last office visit : 2/27/2023   Next office visit : 5/30/2023     Requested Prescriptions     Pending Prescriptions Disp Refills    lansoprazole (PREVACID) 30 MG delayed release capsule [Pharmacy Med Name: LANSOPRAZOLE 30MG CAPSULE DELAYED RELEASE] 30 capsule 5     Sig: TAKE 1 CAPSULE DAILY EVERY MORNING    carvedilol (COREG) 12.5 MG tablet [Pharmacy Med Name: CARVEDILOL 12.5MG TABLET] 60 tablet 0     Sig: TAKE 1 TABLET TWICE A DAY            Esperanza Ojeda

## 2023-03-21 ENCOUNTER — ANESTHESIA EVENT (OUTPATIENT)
Dept: PERIOP | Facility: HOSPITAL | Age: 83
End: 2023-03-21
Payer: MEDICARE

## 2023-03-21 ENCOUNTER — ANESTHESIA (OUTPATIENT)
Dept: PERIOP | Facility: HOSPITAL | Age: 83
End: 2023-03-21
Payer: MEDICARE

## 2023-03-21 ENCOUNTER — HOSPITAL ENCOUNTER (OUTPATIENT)
Facility: HOSPITAL | Age: 83
Setting detail: HOSPITAL OUTPATIENT SURGERY
Discharge: HOME | End: 2023-03-21
Attending: STUDENT IN AN ORGANIZED HEALTH CARE EDUCATION/TRAINING PROGRAM | Admitting: STUDENT IN AN ORGANIZED HEALTH CARE EDUCATION/TRAINING PROGRAM
Payer: MEDICARE

## 2023-03-21 VITALS
RESPIRATION RATE: 18 BRPM | OXYGEN SATURATION: 94 % | HEART RATE: 86 BPM | SYSTOLIC BLOOD PRESSURE: 135 MMHG | DIASTOLIC BLOOD PRESSURE: 57 MMHG | TEMPERATURE: 97.8 F

## 2023-03-21 DIAGNOSIS — K64.2 GRADE III INTERNAL HEMORRHOIDS: ICD-10-CM

## 2023-03-21 DIAGNOSIS — K64.4 EXTERNAL HEMORRHOIDS: ICD-10-CM

## 2023-03-21 PROCEDURE — 25010000002 HEPARIN (PORCINE) PER 1000 UNITS: Performed by: STUDENT IN AN ORGANIZED HEALTH CARE EDUCATION/TRAINING PROGRAM

## 2023-03-21 PROCEDURE — C9290 INJ, BUPIVACAINE LIPOSOME: HCPCS | Performed by: STUDENT IN AN ORGANIZED HEALTH CARE EDUCATION/TRAINING PROGRAM

## 2023-03-21 PROCEDURE — 88304 TISSUE EXAM BY PATHOLOGIST: CPT | Performed by: STUDENT IN AN ORGANIZED HEALTH CARE EDUCATION/TRAINING PROGRAM

## 2023-03-21 PROCEDURE — 0 BUPIVACAINE LIPOSOME 1.3 % SUSPENSION 20 ML VIAL: Performed by: STUDENT IN AN ORGANIZED HEALTH CARE EDUCATION/TRAINING PROGRAM

## 2023-03-21 PROCEDURE — 25010000002 ONDANSETRON PER 1 MG: Performed by: NURSE ANESTHETIST, CERTIFIED REGISTERED

## 2023-03-21 PROCEDURE — 46260 REMOVE IN/EX HEM GROUPS 2+: CPT | Performed by: STUDENT IN AN ORGANIZED HEALTH CARE EDUCATION/TRAINING PROGRAM

## 2023-03-21 PROCEDURE — 25010000002 PROPOFOL 10 MG/ML EMULSION: Performed by: NURSE ANESTHETIST, CERTIFIED REGISTERED

## 2023-03-21 PROCEDURE — 25010000002 DEXAMETHASONE PER 1 MG: Performed by: NURSE ANESTHETIST, CERTIFIED REGISTERED

## 2023-03-21 PROCEDURE — 25010000002 FENTANYL CITRATE (PF) 100 MCG/2ML SOLUTION: Performed by: NURSE ANESTHETIST, CERTIFIED REGISTERED

## 2023-03-21 PROCEDURE — 25010000002 NEOSTIGMINE 10 MG/10ML SOLUTION: Performed by: NURSE ANESTHETIST, CERTIFIED REGISTERED

## 2023-03-21 PROCEDURE — 25010000002 FENTANYL CITRATE (PF) 50 MCG/ML SOLUTION: Performed by: ANESTHESIOLOGY

## 2023-03-21 PROCEDURE — 25010000002 DROPERIDOL PER 5 MG: Performed by: ANESTHESIOLOGY

## 2023-03-21 PROCEDURE — 25010000002 CEFAZOLIN PER 500 MG: Performed by: NURSE ANESTHETIST, CERTIFIED REGISTERED

## 2023-03-21 PROCEDURE — 25010000002 MIDAZOLAM PER 1 MG: Performed by: ANESTHESIOLOGY

## 2023-03-21 DEVICE — HEMOST ABS SURGIFOAM SZ100 8X12 10MM: Type: IMPLANTABLE DEVICE | Site: RECTUM | Status: FUNCTIONAL

## 2023-03-21 RX ORDER — METRONIDAZOLE 500 MG/100ML
INJECTION, SOLUTION INTRAVENOUS AS NEEDED
Status: DISCONTINUED | OUTPATIENT
Start: 2023-03-21 | End: 2023-03-21 | Stop reason: SURG

## 2023-03-21 RX ORDER — SODIUM CHLORIDE, SODIUM LACTATE, POTASSIUM CHLORIDE, CALCIUM CHLORIDE 600; 310; 30; 20 MG/100ML; MG/100ML; MG/100ML; MG/100ML
100 INJECTION, SOLUTION INTRAVENOUS CONTINUOUS
Status: DISCONTINUED | OUTPATIENT
Start: 2023-03-21 | End: 2023-03-21 | Stop reason: HOSPADM

## 2023-03-21 RX ORDER — MAGNESIUM HYDROXIDE 1200 MG/15ML
LIQUID ORAL AS NEEDED
Status: DISCONTINUED | OUTPATIENT
Start: 2023-03-21 | End: 2023-03-21 | Stop reason: HOSPADM

## 2023-03-21 RX ORDER — NEOSTIGMINE METHYLSULFATE 1 MG/ML
INJECTION, SOLUTION INTRAVENOUS AS NEEDED
Status: DISCONTINUED | OUTPATIENT
Start: 2023-03-21 | End: 2023-03-21 | Stop reason: SURG

## 2023-03-21 RX ORDER — SODIUM CHLORIDE 0.9 % (FLUSH) 0.9 %
3-10 SYRINGE (ML) INJECTION AS NEEDED
Status: DISCONTINUED | OUTPATIENT
Start: 2023-03-21 | End: 2023-03-21 | Stop reason: HOSPADM

## 2023-03-21 RX ORDER — ROCURONIUM BROMIDE 10 MG/ML
INJECTION, SOLUTION INTRAVENOUS AS NEEDED
Status: DISCONTINUED | OUTPATIENT
Start: 2023-03-21 | End: 2023-03-21 | Stop reason: SURG

## 2023-03-21 RX ORDER — PROPOFOL 10 MG/ML
VIAL (ML) INTRAVENOUS AS NEEDED
Status: DISCONTINUED | OUTPATIENT
Start: 2023-03-21 | End: 2023-03-21 | Stop reason: SURG

## 2023-03-21 RX ORDER — NALOXONE HYDROCHLORIDE 4 MG/.1ML
1 SPRAY NASAL AS NEEDED
Qty: 2 EACH | Refills: 1 | Status: SHIPPED | OUTPATIENT
Start: 2023-03-21

## 2023-03-21 RX ORDER — FLUMAZENIL 0.1 MG/ML
0.2 INJECTION INTRAVENOUS AS NEEDED
Status: DISCONTINUED | OUTPATIENT
Start: 2023-03-21 | End: 2023-03-21 | Stop reason: HOSPADM

## 2023-03-21 RX ORDER — ONDANSETRON 4 MG/1
4 TABLET, FILM COATED ORAL EVERY 8 HOURS PRN
Qty: 15 TABLET | Refills: 0 | Status: SHIPPED | OUTPATIENT
Start: 2023-03-21 | End: 2024-03-20

## 2023-03-21 RX ORDER — DOCUSATE SODIUM 100 MG/1
100 CAPSULE, LIQUID FILLED ORAL 2 TIMES DAILY
Qty: 20 CAPSULE | Refills: 0 | Status: SHIPPED | OUTPATIENT
Start: 2023-03-21 | End: 2023-03-31

## 2023-03-21 RX ORDER — ACETAMINOPHEN 325 MG/1
975 TABLET ORAL EVERY 8 HOURS
Qty: 100 TABLET | Refills: 2
Start: 2023-03-21 | End: 2024-03-20

## 2023-03-21 RX ORDER — FENTANYL CITRATE 50 UG/ML
INJECTION, SOLUTION INTRAMUSCULAR; INTRAVENOUS AS NEEDED
Status: DISCONTINUED | OUTPATIENT
Start: 2023-03-21 | End: 2023-03-21 | Stop reason: SURG

## 2023-03-21 RX ORDER — HEPARIN SODIUM 5000 [USP'U]/ML
5000 INJECTION, SOLUTION INTRAVENOUS; SUBCUTANEOUS ONCE
Status: COMPLETED | OUTPATIENT
Start: 2023-03-21 | End: 2023-03-21

## 2023-03-21 RX ORDER — IBUPROFEN 200 MG
600 TABLET ORAL EVERY 8 HOURS
Qty: 100 TABLET | Refills: 2
Start: 2023-03-21 | End: 2024-03-20

## 2023-03-21 RX ORDER — FENTANYL CITRATE 50 UG/ML
25 INJECTION, SOLUTION INTRAMUSCULAR; INTRAVENOUS
Status: DISCONTINUED | OUTPATIENT
Start: 2023-03-21 | End: 2023-03-21 | Stop reason: HOSPADM

## 2023-03-21 RX ORDER — LIDOCAINE HYDROCHLORIDE 10 MG/ML
0.5 INJECTION, SOLUTION EPIDURAL; INFILTRATION; INTRACAUDAL; PERINEURAL ONCE AS NEEDED
Status: DISCONTINUED | OUTPATIENT
Start: 2023-03-21 | End: 2023-03-21 | Stop reason: SDUPTHER

## 2023-03-21 RX ORDER — SODIUM CHLORIDE 0.9 % (FLUSH) 0.9 %
3 SYRINGE (ML) INJECTION AS NEEDED
Status: DISCONTINUED | OUTPATIENT
Start: 2023-03-21 | End: 2023-03-21 | Stop reason: HOSPADM

## 2023-03-21 RX ORDER — OXYCODONE AND ACETAMINOPHEN 10; 325 MG/1; MG/1
1 TABLET ORAL ONCE AS NEEDED
Status: DISCONTINUED | OUTPATIENT
Start: 2023-03-21 | End: 2023-03-21 | Stop reason: HOSPADM

## 2023-03-21 RX ORDER — IBUPROFEN 400 MG/1
400 TABLET ORAL ONCE AS NEEDED
Status: DISCONTINUED | OUTPATIENT
Start: 2023-03-21 | End: 2023-03-21 | Stop reason: HOSPADM

## 2023-03-21 RX ORDER — LABETALOL HYDROCHLORIDE 5 MG/ML
5 INJECTION, SOLUTION INTRAVENOUS
Status: DISCONTINUED | OUTPATIENT
Start: 2023-03-21 | End: 2023-03-21 | Stop reason: HOSPADM

## 2023-03-21 RX ORDER — LIDOCAINE HYDROCHLORIDE 10 MG/ML
0.5 INJECTION, SOLUTION EPIDURAL; INFILTRATION; INTRACAUDAL; PERINEURAL ONCE AS NEEDED
Status: DISCONTINUED | OUTPATIENT
Start: 2023-03-21 | End: 2023-03-21 | Stop reason: HOSPADM

## 2023-03-21 RX ORDER — CEFAZOLIN SODIUM 1 G/3ML
INJECTION, POWDER, FOR SOLUTION INTRAMUSCULAR; INTRAVENOUS AS NEEDED
Status: DISCONTINUED | OUTPATIENT
Start: 2023-03-21 | End: 2023-03-21 | Stop reason: SURG

## 2023-03-21 RX ORDER — SODIUM CHLORIDE, SODIUM LACTATE, POTASSIUM CHLORIDE, CALCIUM CHLORIDE 600; 310; 30; 20 MG/100ML; MG/100ML; MG/100ML; MG/100ML
1000 INJECTION, SOLUTION INTRAVENOUS CONTINUOUS
Status: DISCONTINUED | OUTPATIENT
Start: 2023-03-21 | End: 2023-03-21 | Stop reason: HOSPADM

## 2023-03-21 RX ORDER — BUPIVACAINE HYDROCHLORIDE AND EPINEPHRINE 5; 5 MG/ML; UG/ML
INJECTION, SOLUTION PERINEURAL AS NEEDED
Status: DISCONTINUED | OUTPATIENT
Start: 2023-03-21 | End: 2023-03-21 | Stop reason: HOSPADM

## 2023-03-21 RX ORDER — NALOXONE HCL 0.4 MG/ML
0.4 VIAL (ML) INJECTION AS NEEDED
Status: DISCONTINUED | OUTPATIENT
Start: 2023-03-21 | End: 2023-03-21 | Stop reason: HOSPADM

## 2023-03-21 RX ORDER — ONDANSETRON 2 MG/ML
INJECTION INTRAMUSCULAR; INTRAVENOUS AS NEEDED
Status: DISCONTINUED | OUTPATIENT
Start: 2023-03-21 | End: 2023-03-21 | Stop reason: SURG

## 2023-03-21 RX ORDER — OXYCODONE HYDROCHLORIDE 5 MG/1
5 TABLET ORAL EVERY 8 HOURS PRN
Qty: 10 TABLET | Refills: 0 | Status: SHIPPED | OUTPATIENT
Start: 2023-03-21 | End: 2024-03-20

## 2023-03-21 RX ORDER — DROPERIDOL 2.5 MG/ML
0.62 INJECTION, SOLUTION INTRAMUSCULAR; INTRAVENOUS ONCE AS NEEDED
Status: COMPLETED | OUTPATIENT
Start: 2023-03-21 | End: 2023-03-21

## 2023-03-21 RX ORDER — MIDAZOLAM HYDROCHLORIDE 1 MG/ML
0.5 INJECTION INTRAMUSCULAR; INTRAVENOUS
Status: DISCONTINUED | OUTPATIENT
Start: 2023-03-21 | End: 2023-03-21 | Stop reason: HOSPADM

## 2023-03-21 RX ORDER — DEXAMETHASONE SODIUM PHOSPHATE 4 MG/ML
INJECTION, SOLUTION INTRA-ARTICULAR; INTRALESIONAL; INTRAMUSCULAR; INTRAVENOUS; SOFT TISSUE AS NEEDED
Status: DISCONTINUED | OUTPATIENT
Start: 2023-03-21 | End: 2023-03-21 | Stop reason: SURG

## 2023-03-21 RX ORDER — ACETAMINOPHEN 500 MG
1000 TABLET ORAL ONCE
Status: COMPLETED | OUTPATIENT
Start: 2023-03-21 | End: 2023-03-21

## 2023-03-21 RX ORDER — LIDOCAINE HYDROCHLORIDE 20 MG/ML
INJECTION, SOLUTION EPIDURAL; INFILTRATION; INTRACAUDAL; PERINEURAL AS NEEDED
Status: DISCONTINUED | OUTPATIENT
Start: 2023-03-21 | End: 2023-03-21 | Stop reason: SURG

## 2023-03-21 RX ORDER — POLYETHYLENE GLYCOL 3350 17 G/17G
17 POWDER, FOR SOLUTION ORAL DAILY
Qty: 10 PACKET | Refills: 0 | Status: SHIPPED | OUTPATIENT
Start: 2023-03-21 | End: 2023-03-31

## 2023-03-21 RX ORDER — SODIUM CHLORIDE 9 MG/ML
40 INJECTION, SOLUTION INTRAVENOUS AS NEEDED
Status: DISCONTINUED | OUTPATIENT
Start: 2023-03-21 | End: 2023-03-21 | Stop reason: HOSPADM

## 2023-03-21 RX ORDER — OXYCODONE AND ACETAMINOPHEN 7.5; 325 MG/1; MG/1
2 TABLET ORAL EVERY 4 HOURS PRN
Status: DISCONTINUED | OUTPATIENT
Start: 2023-03-21 | End: 2023-03-21 | Stop reason: HOSPADM

## 2023-03-21 RX ORDER — METRONIDAZOLE 500 MG/100ML
500 INJECTION, SOLUTION INTRAVENOUS ONCE
Status: COMPLETED | OUTPATIENT
Start: 2023-03-21 | End: 2023-03-21

## 2023-03-21 RX ORDER — SODIUM CHLORIDE 0.9 % (FLUSH) 0.9 %
3 SYRINGE (ML) INJECTION EVERY 12 HOURS SCHEDULED
Status: DISCONTINUED | OUTPATIENT
Start: 2023-03-21 | End: 2023-03-21 | Stop reason: HOSPADM

## 2023-03-21 RX ORDER — ONDANSETRON 2 MG/ML
4 INJECTION INTRAMUSCULAR; INTRAVENOUS ONCE AS NEEDED
Status: DISCONTINUED | OUTPATIENT
Start: 2023-03-21 | End: 2023-03-21 | Stop reason: HOSPADM

## 2023-03-21 RX ADMIN — ONDANSETRON 4 MG: 2 INJECTION INTRAMUSCULAR; INTRAVENOUS at 10:02

## 2023-03-21 RX ADMIN — PROPOFOL INJECTABLE EMULSION 120 MG: 10 INJECTION, EMULSION INTRAVENOUS at 09:30

## 2023-03-21 RX ADMIN — OXYCODONE AND ACETAMINOPHEN 2 TABLET: 7.5; 325 TABLET ORAL at 10:21

## 2023-03-21 RX ADMIN — MIDAZOLAM HYDROCHLORIDE 0.5 MG: 2 INJECTION, SOLUTION INTRAMUSCULAR; INTRAVENOUS at 08:38

## 2023-03-21 RX ADMIN — FENTANYL CITRATE 25 MCG: 50 INJECTION, SOLUTION INTRAMUSCULAR; INTRAVENOUS at 10:33

## 2023-03-21 RX ADMIN — FENTANYL CITRATE 25 MCG: 50 INJECTION, SOLUTION INTRAMUSCULAR; INTRAVENOUS at 10:18

## 2023-03-21 RX ADMIN — HEPARIN SODIUM 5000 UNITS: 5000 INJECTION INTRAVENOUS; SUBCUTANEOUS at 08:35

## 2023-03-21 RX ADMIN — DROPERIDOL 0.62 MG: 2.5 INJECTION, SOLUTION INTRAMUSCULAR; INTRAVENOUS at 10:27

## 2023-03-21 RX ADMIN — ROCURONIUM BROMIDE 40 MG: 10 INJECTION, SOLUTION INTRAVENOUS at 09:30

## 2023-03-21 RX ADMIN — SODIUM CHLORIDE, POTASSIUM CHLORIDE, SODIUM LACTATE AND CALCIUM CHLORIDE 1000 ML: 600; 310; 30; 20 INJECTION, SOLUTION INTRAVENOUS at 07:56

## 2023-03-21 RX ADMIN — LIDOCAINE HYDROCHLORIDE 60 MG: 20 INJECTION, SOLUTION EPIDURAL; INFILTRATION; INTRACAUDAL; PERINEURAL at 09:30

## 2023-03-21 RX ADMIN — METRONIDAZOLE 500 MG: 500 INJECTION, SOLUTION INTRAVENOUS at 08:34

## 2023-03-21 RX ADMIN — FENTANYL CITRATE 25 MCG: 50 INJECTION, SOLUTION INTRAMUSCULAR; INTRAVENOUS at 10:23

## 2023-03-21 RX ADMIN — CEFAZOLIN 2 G: 330 INJECTION, POWDER, FOR SOLUTION INTRAMUSCULAR; INTRAVENOUS at 09:47

## 2023-03-21 RX ADMIN — DEXAMETHASONE SODIUM PHOSPHATE 4 MG: 4 INJECTION, SOLUTION INTRA-ARTICULAR; INTRALESIONAL; INTRAMUSCULAR; INTRAVENOUS; SOFT TISSUE at 10:02

## 2023-03-21 RX ADMIN — ACETAMINOPHEN 1000 MG: 500 TABLET, FILM COATED ORAL at 08:38

## 2023-03-21 RX ADMIN — METRONIDAZOLE 500 MG: 500 INJECTION, SOLUTION INTRAVENOUS at 09:27

## 2023-03-21 RX ADMIN — FENTANYL CITRATE 25 MCG: 50 INJECTION, SOLUTION INTRAMUSCULAR; INTRAVENOUS at 10:28

## 2023-03-21 RX ADMIN — FENTANYL CITRATE 50 MCG: 50 INJECTION INTRAMUSCULAR; INTRAVENOUS at 09:30

## 2023-03-21 RX ADMIN — GLYCOPYRROLATE 0.4 MG: 0.2 INJECTION INTRAMUSCULAR; INTRAVENOUS at 10:00

## 2023-03-21 RX ADMIN — NEOSTIGMINE METHYLSULFATE 4 MG: 1 INJECTION INTRAVENOUS at 10:01

## 2023-03-21 NOTE — ANESTHESIA POSTPROCEDURE EVALUATION
Patient: Jennifer oGmes    Procedure Summary     Date: 03/21/23 Room / [facility]:  [facility] /  [facility]    Anesthesia Start: 0927 Anesthesia Stop: 1011    Procedure: HEMORRHOIDECTOMY WITH EXAM UNDER ANESTHESIA (Anus) Diagnosis:       Grade III internal hemorrhoids      External hemorrhoids      (Grade III internal hemorrhoids [K64.2])      (External hemorrhoids [K64.4])    Surgeons: Holly Chavez MD Provider: Telma Barlow CRNA    Anesthesia Type: general ASA Status: 3          Anesthesia Type: general    Vitals  Vitals Value Taken Time   /70 03/21/23 1055   Temp 97.8 °F (36.6 °C) 03/21/23 1055   Pulse 74 03/21/23 1056   Resp 16 03/21/23 1055   SpO2 96 % 03/21/23 1056   Vitals shown include unvalidated device data.        Post Anesthesia Care and Evaluation    PONV Status: none  Comments: Patient d/c from PACU prior to anes eval based on Pavel score.  Please see RN notes for details of d/c criteria.    Blood pressure 135/57, pulse 86, temperature 97.8 °F (36.6 °C), temperature source Temporal, resp. rate 18, SpO2 94 %, not currently breastfeeding.

## 2023-03-21 NOTE — ANESTHESIA PROCEDURE NOTES
Airway  Urgency: elective    Date/Time: 3/21/2023 9:31 AM  Airway not difficult    General Information and Staff    Patient location during procedure: OR  CRNA/CAA: Telma Barlow CRNA    Indications and Patient Condition  Indications for airway management: airway protection    Preoxygenated: yes  Mask difficulty assessment: 1 - vent by mask    Final Airway Details  Final airway type: endotracheal airway      Successful airway: ETT  Cuffed: yes   Successful intubation technique: direct laryngoscopy  Facilitating devices/methods: intubating stylet  Endotracheal tube insertion site: oral  Blade: Dulce  Blade size: 3  ETT size (mm): 7.0  Cormack-Lehane Classification: grade IIa - partial view of glottis  Placement verified by: chest auscultation and capnometry   Cuff volume (mL): 5  Measured from: teeth  ETT/EBT  to teeth (cm): 21  Number of attempts at approach: 1  Assessment: lips, teeth, and gum same as pre-op and atraumatic intubation    Additional Comments  Placed by MAEVE BURT

## 2023-03-21 NOTE — OP NOTE
Hemorrhoidectomy Operative Report:     Patient: Jennifer Gomes  MRN: [id number]    YOB: 1940  Age: 82 y.o.  Sex: female  Unit:  PAD OR Room/Bed: PAD OR/MAIN OR Location: [facility]      Admitting Physician: MEAGHAN CHAVEZ    Primary Care Physician: Olivia Mora APRN             INDICATIONS: Jennifer Gomes is a 82 y.o. female with grade III internal hemorrhoids and external hemorrhoids. We discussed the options for management including continued medical management with trying different topical treatments vs. banding of the internal hemorrhoids vs. hemorrhoidectomy. She does not wish to continue medical management as she has failed this. The patient is not interested in banding as this would not address the external hemorrhoids. The patient would like to proceed with hemorrhoidectomy. We discussed the risks of surgery including bleeding, infection, stricture, urinary retention, and hemorrhoid recurrence. the patient understands that hemorrhoids can recur and that they will need to be on a bowel regimen post operatively with stool softeners and laxatives initially. After the patient has healed, they will need to be vigilant about avoiding constipation. The patient understands all of this and would like to proceed. The patient is scheduled for hemorrhoidectomy on 3/21/23.    DATE OF OPERATION: 3/21/2023     Surgeon(s) and Role:     * Meaghan Chavez MD - Primary    ANESTHESIA: General     PREOPERATIVE DIAGNOSIS: Grade III internal hemorrhoids [K64.2]  External hemorrhoids [K64.4]    POSTOPERATIVE DIAGNOSIS: Same    PROCEDURES PERFORMED:    (1) Two column internal and external hemorrhoidectomy     PROCEDURE DETAILS:   The patient was consented. Pre-operative antibiotics and heparin were given. The patient was transferred to the OR and general anesthesia was induced. The patient was then positioned in the prone position with jackknife. The area was prepped and draped in sterile fashion.  A timeout was performed. There were right anterior internal and external and left lateral internal and external hemorrhoids. The external hemorrhoids were not thrombosed. Exparel mixed with injectable saline was injected as a perianal block. The anus was carefully dilated until three fingers could be introduced. A retractor was introduced and the three hemorrhoidal columns were identified.  A Mayorga clamp was first placed at the base of the right anterior column external and internal hemorrhoid. An elliptical incision was made, encompassing the internal and external hemorrhoids and excising a minimal amount of anoderm. Flaps were developed, taking care to elevate only skin and mucosa. The dilated venous mass was dissected with cautery from the underlying sphincter muscle. The base was secured with a yola clamp. The pedicle was amputated form the base and secured with a 2-0 vicryl suture ligature. Hemostasis was achieved with cautery. The skin and mucosal incision was closed with a running locking 2-0 chromic suture. The left lateral column was then excised and closed in similar fashion. Hemostasis was confirmed.  A gel foam soaked in marcaine with epinephrine was placed in the anal canal. Fluffs and panties were placed. the patient tolerated the procedure well and was transferred to PACU in good condition.      Findings: right anterior and left lateral internal and external hemorrhoids   Estimated Blood Loss: 25mL  Complications: none apparent            Specimens: Hemorrhoids       Disposition: PACU - hemodynamically stable.           Condition: stable    Holly Chavez MD  03/06/2023

## 2023-03-21 NOTE — DISCHARGE INSTRUCTIONS
Wound:   - you have sutures you will see. There is a white foam you may see fall out.     Activity:   - Activity as tolerated. NO heavy lifting x 4 weeks - no more than 25lb at a time.   - No driving or operating machinery on narcotic pain medication.     Pain medication:   - Take 1000mg of tylenol every 8 hours for 3 days. After three days, take it prn.   - Take 600mg of ibuprofen (motrin) every 8 hours for 3 days. After three days, take it prn.   - You have a prescription for a narcotic. It will be roxicodone 5mg tabs. Take these only as needed after you have taken the tylenol and ibuprofen.   - Take the colace twice a day and miralax daily.     Follow up:   - make an appointment to see me in 3 weeks  - If you have any concerns before then, call me office at [phone]

## 2023-03-21 NOTE — ANESTHESIA PREPROCEDURE EVALUATION
Anesthesia Evaluation     Patient summary reviewed   no history of anesthetic complications:  NPO Solid Status: > 8 hours             Airway   Mallampati: II  Dental      Pulmonary    (+) sleep apnea,   Cardiovascular   Exercise tolerance: poor (<4 METS)    (+) hypertension, hyperlipidemia,   (-) pacemaker, past MI, cardiac stents, CABG      Neuro/Psych- negative ROS  GI/Hepatic/Renal/Endo    (+) morbid obesity, GERD, PUD,  renal disease CRI, thyroid problem hypothyroidism    Musculoskeletal     Abdominal    Substance History      OB/GYN          Other   arthritis,                        Anesthesia Plan    ASA 3     general     (Very anxious; reports can't even walk to bathroom well --back/leg weakness- no acute or subacute changes)  intravenous induction     Anesthetic plan, risks, benefits, and alternatives have been provided, discussed and informed consent has been obtained with: patient.

## 2023-03-23 ENCOUNTER — DOCUMENTATION (OUTPATIENT)
Dept: SURGERY | Facility: CLINIC | Age: 83
End: 2023-03-23
Payer: MEDICARE

## 2023-03-23 NOTE — PROGRESS NOTES
Patient called needing to have a BM less than 48 hours after her hemorrhoidectomy. States she thought she was advised to NOT have one till day 3 after surgery. Pt aware it will be painful but ok to proceed with BM and not to hold it in. Advised patient to also stay on her stool softeners for several weeks for some ease.

## 2023-03-27 RX ORDER — SUMATRIPTAN 50 MG/1
TABLET, FILM COATED ORAL
Qty: 18 TABLET | Refills: 5 | Status: SHIPPED | OUTPATIENT
Start: 2023-03-27

## 2023-03-27 NOTE — TELEPHONE ENCOUNTER
Ai Smith called requesting a refill of the below medication which has been pended for you:     Requested Prescriptions     Pending Prescriptions Disp Refills    SUMAtriptan (IMITREX) 50 MG tablet [Pharmacy Med Name: SUMATRIPTAN SUCCINATE 50MG TABLET] 18 tablet 5     Sig: TAKE 1 TABLET BY MOUTH TWICE DAILY AS NEEDED FOR MIGRAINE. MAX 2 TABLETS DAILY.        Last Appointment Date: 2/27/2023  Next Appointment Date: 5/30/2023    Allergies   Allergen Reactions    Ambien [Zolpidem Tartrate]     Codeine     Lyrica [Pregabalin]     Morphine     Requip [Ropinirole Hcl]     Tizanidine      Terrible nightmares
Offered and patient declined

## 2023-03-31 ENCOUNTER — TELEPHONE (OUTPATIENT)
Dept: OBSTETRICS AND GYNECOLOGY | Facility: CLINIC | Age: 83
End: 2023-03-31
Payer: MEDICARE

## 2023-03-31 NOTE — TELEPHONE ENCOUNTER
Pt called and informed me she needed a refill on her Xanax as she would go into withdrawal without it and didn't realize how low it was.  I informed her that Dr. Madrigal called in 2 refills last month and to call her pharmacy, pt voiced understanding.

## 2023-04-12 ENCOUNTER — OFFICE VISIT (OUTPATIENT)
Dept: SURGERY | Facility: CLINIC | Age: 83
End: 2023-04-12
Payer: MEDICARE

## 2023-04-12 VITALS
WEIGHT: 197.31 LBS | HEIGHT: 62 IN | DIASTOLIC BLOOD PRESSURE: 57 MMHG | SYSTOLIC BLOOD PRESSURE: 135 MMHG | BODY MASS INDEX: 36.31 KG/M2 | OXYGEN SATURATION: 99 %

## 2023-04-12 DIAGNOSIS — K64.2 GRADE III INTERNAL HEMORRHOIDS: ICD-10-CM

## 2023-04-12 DIAGNOSIS — E66.09 CLASS 2 OBESITY DUE TO EXCESS CALORIES WITH BODY MASS INDEX (BMI) OF 36.0 TO 36.9 IN ADULT, UNSPECIFIED WHETHER SERIOUS COMORBIDITY PRESENT: ICD-10-CM

## 2023-04-12 DIAGNOSIS — K64.4 EXTERNAL HEMORRHOIDS: Primary | ICD-10-CM

## 2023-04-12 PROCEDURE — 3075F SYST BP GE 130 - 139MM HG: CPT | Performed by: STUDENT IN AN ORGANIZED HEALTH CARE EDUCATION/TRAINING PROGRAM

## 2023-04-12 PROCEDURE — 3078F DIAST BP <80 MM HG: CPT | Performed by: STUDENT IN AN ORGANIZED HEALTH CARE EDUCATION/TRAINING PROGRAM

## 2023-04-12 PROCEDURE — 1160F RVW MEDS BY RX/DR IN RCRD: CPT | Performed by: STUDENT IN AN ORGANIZED HEALTH CARE EDUCATION/TRAINING PROGRAM

## 2023-04-12 PROCEDURE — 1159F MED LIST DOCD IN RCRD: CPT | Performed by: STUDENT IN AN ORGANIZED HEALTH CARE EDUCATION/TRAINING PROGRAM

## 2023-04-12 NOTE — PATIENT INSTRUCTIONS

## 2023-04-12 NOTE — PROGRESS NOTES
"Patient: Jennifer Gomes    YOB: 1940    Date: 04/12/2023    Primary Care Provider: Olivia Mora APRN    Vital Signs:   Vitals:    04/12/23 0925   BP: 135/57   BP Location: Left arm   Patient Position: Sitting   Cuff Size: Adult   SpO2: 99%   Weight: 89.5 kg (197 lb 5 oz)   Height: 157 cm (61.81\")       The patient is tolerating a regular diet and has no complaints s/p hemorrhoidectomy on 3/21/23. The patient denies fevers, chills, nausea, vomiting, and excessive pain. She does still have some pain but she also has chronic pain and is on chronic oxycodone.  On exam, she does have some skin tags.    Results Review:   I reviewed the patient's new clinical results.    Tissue Pathology Exam (03/21/2023 10:07)  Hemorrhoids, hemorrhoidectomy:  Hemorrhoids    Assessment / Plan:    Diagnoses and all orders for this visit:    1. External hemorrhoids (Primary)  Overview:  Added automatically from request for surgery [id number]      2. Grade III internal hemorrhoids  Overview:  Added automatically from request for surgery [id number]      3. Class 2 obesity due to excess calories with body mass index (BMI) of 36.0 to 36.9 in adult, unspecified whether serious comorbidity present    She is healing well. Rectal bri sent to [place] [facility] for residual topical pain. She did inquire about more narcotics as she is on chronic narcotics from Dr. Garcia and will run out Sunday; he is out of town. I told her this far out, tylenol and motrin and rectal bri should be sufficient. She will follow up as needed.       Electronically signed by Holly Chavez MD  04/12/23  09:36 CDT                    "

## 2023-04-14 DIAGNOSIS — M54.2 NECK PAIN: ICD-10-CM

## 2023-04-17 RX ORDER — ERGOCALCIFEROL 1.25 MG/1
CAPSULE ORAL
Qty: 12 CAPSULE | Refills: 2 | Status: SHIPPED | OUTPATIENT
Start: 2023-04-17

## 2023-04-17 RX ORDER — CYCLOBENZAPRINE HCL 10 MG
TABLET ORAL
Qty: 30 TABLET | Refills: 0 | Status: SHIPPED | OUTPATIENT
Start: 2023-04-17

## 2023-04-19 RX ORDER — CARVEDILOL 12.5 MG/1
TABLET ORAL
Qty: 60 TABLET | Refills: 0 | Status: SHIPPED | OUTPATIENT
Start: 2023-04-19

## 2023-04-19 NOTE — TELEPHONE ENCOUNTER
Devorah Ceballos called requesting a refill of the below medication which has been pended for you:     Requested Prescriptions     Pending Prescriptions Disp Refills    carvedilol (COREG) 12.5 MG tablet [Pharmacy Med Name: CARVEDILOL 12.5MG TABLET] 60 tablet 0     Sig: TAKE 1 TABLET TWICE A DAY       Last Appointment Date: 2/27/2023  Next Appointment Date: 5/30/2023    Allergies   Allergen Reactions    Ambien [Zolpidem Tartrate]     Codeine     Lyrica [Pregabalin]     Morphine     Requip [Ropinirole Hcl]     Tizanidine      Terrible nightmares             Devorah Ceballos called requesting a refill of the below medication which has been pended for you:     Requested Prescriptions     Pending Prescriptions Disp Refills    carvedilol (COREG) 12.5 MG tablet [Pharmacy Med Name: CARVEDILOL 12.5MG TABLET] 60 tablet 0     Sig: TAKE 1 TABLET TWICE A DAY       Last Appointment Date: 2/27/2023  Next Appointment Date: 5/30/2023    Allergies   Allergen Reactions    Ambien [Zolpidem Tartrate]     Codeine     Lyrica [Pregabalin]     Morphine     Requip [Ropinirole Hcl]     Tizanidine      Terrible nightmares

## 2023-05-01 RX ORDER — DICLOFENAC SODIUM 75 MG/1
75 TABLET, DELAYED RELEASE ORAL 2 TIMES DAILY
Qty: 60 TABLET | Refills: 1 | Status: SHIPPED | OUTPATIENT
Start: 2023-05-01

## 2023-05-04 ENCOUNTER — OFFICE VISIT (OUTPATIENT)
Dept: SURGERY | Facility: CLINIC | Age: 83
End: 2023-05-04
Payer: MEDICARE

## 2023-05-04 VITALS
DIASTOLIC BLOOD PRESSURE: 57 MMHG | SYSTOLIC BLOOD PRESSURE: 135 MMHG | WEIGHT: 217.15 LBS | HEART RATE: 78 BPM | OXYGEN SATURATION: 97 % | BODY MASS INDEX: 39.96 KG/M2 | HEIGHT: 62 IN

## 2023-05-04 DIAGNOSIS — Z98.890 S/P HEMORRHOIDECTOMY: Primary | ICD-10-CM

## 2023-05-04 DIAGNOSIS — G89.29 OTHER CHRONIC PAIN: ICD-10-CM

## 2023-05-04 DIAGNOSIS — Z87.19 S/P HEMORRHOIDECTOMY: Primary | ICD-10-CM

## 2023-05-04 NOTE — PROGRESS NOTES
"Patient: Jennifer Gomes    YOB: 1940    Date: 05/04/2023    Primary Care Provider: Olivia Mora APRN    Vital Signs:   Vitals:    05/04/23 1449   BP: 135/57   BP Location: Left arm   Patient Position: Sitting   Cuff Size: Adult   Pulse: 78   SpO2: 97%   Weight: 98.5 kg (217 lb 2.5 oz)   Height: 157 cm (61.81\")       Ms. Gomes is an 82 year old female s/p hemorrhoidectomy. The day after she saw me in office, her pain got worse day by day. Now it is so painful that she cannot sit unless she takes oxycodone. She spends her whole day in bed. She has not had any bleeding for 4-5 days. She doesn't feel anything near her anus except the skin tag. If feels like a baby's fist pushing against her rectum. Exam shows small skin tags. I do not see any discrete hemorrhoids nor a fissure.     Results Review:   I reviewed the patient's new clinical results.        Assessment / Plan:    Diagnoses and all orders for this visit:    1. S/P hemorrhoidectomy (Primary)    2. Other chronic pain      Ms. Gomes is an 82 year old female s/p hemorrhoidectomy who also has severe chronic pain on 15mg Roxicodone multiple times per day from her PCP. She reports she lays in bed all day or sits. I have explained that with her chronic pain on chronic narcotics and the constant pressure on this area, I do not think she will be pain free until she cuts down her narcotics and is up and moving. I do not see an anatomical reason for her pain. Follow up as needed.     Electronically signed by Holly Chavez MD  05/05/23  21:30 CDT                  "

## 2023-05-06 NOTE — PATIENT INSTRUCTIONS

## 2023-05-19 ENCOUNTER — TELEPHONE (OUTPATIENT)
Dept: OBSTETRICS AND GYNECOLOGY | Facility: CLINIC | Age: 83
End: 2023-05-19
Payer: MEDICARE

## 2023-05-19 NOTE — TELEPHONE ENCOUNTER
Pt called and left voicemail on nurse line, returned pts call, no answer, left voicemail to return my call at her earliest convenience.  Pt reported on message something about increasing her Percocet as well as Xanax due to her pain but I could not understand what she was needing as it was from her hemorrhoid sx that we did not prescribe and wanted a refill on Xanax, but would need an appt prior to doing so.

## 2023-05-19 NOTE — TELEPHONE ENCOUNTER
Pt returned call and she informed me that she needed to see Dr. Madrigal instead of Daphne.  I informed her that I could transfer her to the  to make the soonest available appt with Dr. Madrigal and she informed me that she would just keep the appointment as scheduled and did not need anything else at this time.  I asked pt about her medication concerns in her message and she said she would discuss this with Daphne on Tuesday.

## 2023-05-22 RX ORDER — CARVEDILOL 12.5 MG/1
TABLET ORAL
Qty: 180 TABLET | Refills: 1 | Status: SHIPPED | OUTPATIENT
Start: 2023-05-22

## 2023-05-22 RX ORDER — SUMATRIPTAN 50 MG/1
TABLET, FILM COATED ORAL
Qty: 18 TABLET | Refills: 5 | Status: SHIPPED | OUTPATIENT
Start: 2023-05-22 | End: 2023-05-24 | Stop reason: SDUPTHER

## 2023-05-22 RX ORDER — SUMATRIPTAN 50 MG/1
TABLET, FILM COATED ORAL
Qty: 18 TABLET | Refills: 5 | Status: SHIPPED | OUTPATIENT
Start: 2023-05-22 | End: 2023-05-22 | Stop reason: SDUPTHER

## 2023-05-22 NOTE — TELEPHONE ENCOUNTER
Tahir called requesting a refill of the below medication which has been pended for you:     Requested Prescriptions     Pending Prescriptions Disp Refills    SUMAtriptan (IMITREX) 50 MG tablet 18 tablet 5     Sig: TAKE 1 TABLET BY MOUTH TWICE DAILY AS NEEDED FOR MIGRAINE. MAX 2 TABLETS DAILY.   [id number] Good Rx       Last Appointment Date: 2/27/2023  Next Appointment Date: 5/30/2023    Allergies   Allergen Reactions    Ambien [Zolpidem Tartrate]     Codeine     Lyrica [Pregabalin]     Morphine     Requip [Ropinirole Hcl]     Tizanidine      Terrible nightmares

## 2023-05-23 ENCOUNTER — OFFICE VISIT (OUTPATIENT)
Dept: OBSTETRICS AND GYNECOLOGY | Facility: CLINIC | Age: 83
End: 2023-05-23
Payer: MEDICARE

## 2023-05-23 VITALS
DIASTOLIC BLOOD PRESSURE: 90 MMHG | SYSTOLIC BLOOD PRESSURE: 158 MMHG | BODY MASS INDEX: 34.04 KG/M2 | WEIGHT: 185 LBS | HEIGHT: 62 IN

## 2023-05-23 DIAGNOSIS — F41.1 GAD (GENERALIZED ANXIETY DISORDER): Primary | ICD-10-CM

## 2023-05-23 DIAGNOSIS — F41.1 GAD (GENERALIZED ANXIETY DISORDER): ICD-10-CM

## 2023-05-23 DIAGNOSIS — I10 ESSENTIAL HYPERTENSION: ICD-10-CM

## 2023-05-23 DIAGNOSIS — F41.9 ANXIETY: ICD-10-CM

## 2023-05-23 RX ORDER — ALPRAZOLAM 1 MG/1
1 TABLET ORAL 3 TIMES DAILY PRN
Qty: 90 TABLET | Refills: 2 | Status: SHIPPED | OUTPATIENT
Start: 2023-05-27

## 2023-05-23 RX ORDER — LIDOCAINE 5 G/100G
CREAM RECTAL; TOPICAL
COMMUNITY
Start: 2023-05-18

## 2023-05-23 RX ORDER — HYDROCORTISONE 1 G/100G
OINTMENT TOPICAL
COMMUNITY
Start: 2023-02-02

## 2023-05-23 NOTE — PROGRESS NOTES
Chief Complaint   Patient presents with    Anxiety     Patient is here for a refill of her Xanax.  She reports here anxiety is well controlled and just needs medication refill. She has been on it for 'years' and feels it may not be effective as it once was.  She reports this medication has been relieving her pain better than just the pain medication alone.       History:  Jennifer Gomes is a 82 y.o. female who presents today for follow-up for evaluation of the above:    HPI    Patient presents today for f/u anxiety.  She has been taking her Xanax as prescribed without issues at this time. She does report that she feels at times she would like a dose increase as she feels the xanax not only helps her sleep and anxiety nut also her chronic pain.         ROS:  Review of Systems   Constitutional: Negative.    HENT: Negative.     Eyes: Negative.    Respiratory: Negative.     Cardiovascular: Negative.    Gastrointestinal: Negative.    Endocrine: Negative.    Genitourinary: Negative.    Musculoskeletal:  Positive for gait problem (she is in a wheelchair today).   Skin: Negative.    Psychiatric/Behavioral:  Positive for sleep disturbance. The patient is nervous/anxious.      Ms. Gomes  reports that she has never smoked. She has never used smokeless tobacco. She reports current alcohol use. She reports that she does not use drugs.      Current Outpatient Medications:     acetaminophen (Tylenol) 325 MG tablet, Take 3 tablets by mouth Every 8 (Eight) Hours. Take every 8 hours for 3 days then take prn as needed., Disp: 100 tablet, Rfl: 2    ALPRAZolam (XANAX) 1 MG tablet, Take 1 tablet by mouth 3 (Three) Times a Day As Needed for Anxiety or Sleep. (Patient taking differently: Take 1 tablet by mouth At Night As Needed for Anxiety or Sleep.), Disp: 90 tablet, Rfl: 2    aspirin 325 MG tablet, Take 1 tablet by mouth Daily., Disp: , Rfl:     atorvastatin (LIPITOR) 40 MG tablet, Take 1 tablet by mouth Daily., Disp: , Rfl:      bumetanide (BUMEX) 1 MG tablet, Take 1 tablet by mouth As Needed., Disp: , Rfl:     butalbital-acetaminophen-caffeine (FIORICET, ESGIC) -40 MG per tablet, Take 1 tablet by mouth 2 (Two) Times a Day As Needed for Headache., Disp: , Rfl:     carvedilol (COREG) 12.5 MG tablet, Take 1 tablet by mouth 2 (Two) Times a Day With Meals., Disp: , Rfl:     cyclobenzaprine (FLEXERIL) 10 MG tablet, 2 (Two) Times a Day As Needed., Disp: , Rfl:     diclofenac (VOLTAREN) 75 MG EC tablet, Take 1 tablet by mouth 2 (Two) Times a Day., Disp: , Rfl:     diphenhydrAMINE (BENADRYL) 25 mg capsule, Take 1 capsule by mouth Every 6 (Six) Hours As Needed for Itching., Disp: , Rfl:     donepezil (ARICEPT) 10 MG tablet, Take 1 tablet by mouth Every Night., Disp: , Rfl:     ergocalciferol (ERGOCALCIFEROL) 76250 units capsule, Take 1 capsule by mouth 1 (One) Time Per Week. Saturday, Disp: , Rfl:     GNP Hydrocortisone Max St 1 % ointment, APPLY TOPICALLY 3 TIMES DAILY., Disp: , Rfl:     ibuprofen (Motrin IB) 200 MG tablet, Take 3 tablets by mouth Every 8 (Eight) Hours. Take every 8 hours for three days then take as needed., Disp: 100 tablet, Rfl: 2    ipratropium (ATROVENT) 0.06 % nasal spray, 2 sprays into the nostril(s) as directed by provider 4 (Four) Times a Day As Needed for Rhinitis. For drainage, Disp: 45 mL, Rfl: 3    ipratropium (ATROVENT) 0.06 % nasal spray, 2 sprays., Disp: , Rfl:     LACTASE ENZYME PO, Take 3 tablets by mouth As Needed (takes before dairy products)., Disp: , Rfl:     lansoprazole (PREVACID) 30 MG capsule, Take 1 capsule by mouth Every Morning., Disp: , Rfl:     levothyroxine (SYNTHROID, LEVOTHROID) 50 MCG tablet, Take 1 tablet by mouth Every Morning., Disp: , Rfl:     Lidocaine, Anorectal, (LMX 5) 5 % cream cream, , Disp: , Rfl:     magnesium hydroxide (Milk of Magnesia) 400 MG/5ML suspension, Take 30 mL by mouth Daily As Needed for Constipation., Disp: 360 mL, Rfl: 3    naloxone (Narcan) 4 MG/0.1ML nasal spray,  "1 spray into the nostril(s) as directed by provider As Needed (narcotic overdose)., Disp: 2 each, Rfl: 1    ondansetron (Zofran) 4 MG tablet, Take 1 tablet by mouth Every 8 (Eight) Hours As Needed for Nausea or Vomiting., Disp: 15 tablet, Rfl: 0    oxyCODONE (ROXICODONE) 15 MG immediate release tablet, Every 6 (Six) Hours As Needed., Disp: , Rfl:     oxyCODONE (Roxicodone) 5 MG immediate release tablet, Take 1 tablet by mouth Every 8 (Eight) Hours As Needed for Severe Pain., Disp: 10 tablet, Rfl: 0    polyethylene glycol (MiraLax) 17 g packet, Take 17 g by mouth Daily., Disp: 100 each, Rfl: 3    sertraline (ZOLOFT) 25 MG tablet, Take 1 tablet by mouth Daily., Disp: 90 tablet, Rfl: 3    solifenacin (VESICARE) 10 MG tablet, Take 1 tablet by mouth Daily., Disp: 30 tablet, Rfl: 11    SUMAtriptan (IMITREX) 50 MG tablet, Take 1 tablet by mouth 2 (Two) Times a Day As Needed for Migraine., Disp: 30 tablet, Rfl: 3    venlafaxine (EFFEXOR) 25 MG tablet, Take 1 tablet by mouth Daily., Disp: 90 tablet, Rfl: 3      OBJECTIVE:  /90   Ht 157.5 cm (62\")   Wt 83.9 kg (185 lb)   LMP  (LMP Unknown)   BMI 33.84 kg/m²    Physical Exam  Constitutional:       Appearance: She is not ill-appearing.   Cardiovascular:      Heart sounds: Normal heart sounds.   Pulmonary:      Effort: No respiratory distress.      Breath sounds: Normal breath sounds.   Neurological:      Mental Status: She is oriented to person, place, and time.   Psychiatric:         Behavior: Behavior normal.       Assessment/Plan    Diagnoses and all orders for this visit:    1. PIERCE (generalized anxiety disorder) (Primary)  Comments:  PDMP is reviewed and is appropriate.  we discussed that her current Rx is 1mg three times per day PRN.   patient VU. She is advised to not change her dose unless instructed to do so by a medical provider.   Not due for refill at this time. Filled 30 days on 04/29/2023.    2. Essential hypertension  Comments:  not well controlled and " recommend f/u with PCP         An After Visit Summary was printed and given to the patient at discharge.  Return in about 3 months (around 8/23/2023). Sooner if problems arise.          Daphne MORA. 5/23/2023   Electronically Signed

## 2023-05-23 NOTE — TELEPHONE ENCOUNTER
Patient was seen for her 3 month f/u in the office today.   She is not due for refill of her xanax until 05/28/2023. This is a Sunday followed by a holiday and is requesting to  her medication on 05/27/2023 which is one day early.   PDMP is reviewed and is appropriate.   Xanax refill is pended with refills.

## 2023-05-24 RX ORDER — SUMATRIPTAN 50 MG/1
TABLET, FILM COATED ORAL
Qty: 18 TABLET | Refills: 5 | Status: SHIPPED | OUTPATIENT
Start: 2023-05-24

## 2023-05-24 NOTE — TELEPHONE ENCOUNTER
I have signed the refill request for the patient's Xanax, which Daphne sent to me so that I could approve 3 months worth.  Please let the patient know that her prescription should be available at the pharmacy.  Please also let the patient know that future scripts would be more appropriately written by her PCP.  Although I have done this for some time, we are now having difficulty getting GYN patients into the office in a timely manner, which means that we need to reduce the number of non-GYN appointments that are being made.  I was planning to have this discussion with Jennifer the next time I saw her, and did not realize that I would not be seeing her for her next visit.  Thanks

## 2023-05-31 DIAGNOSIS — M54.2 NECK PAIN: ICD-10-CM

## 2023-05-31 RX ORDER — CYCLOBENZAPRINE HCL 10 MG
TABLET ORAL
Qty: 30 TABLET | Refills: 1 | Status: SHIPPED | OUTPATIENT
Start: 2023-05-31

## 2023-05-31 NOTE — TELEPHONE ENCOUNTER
Janelle Garcia called requesting a refill of the below medication which has been pended for you:     Requested Prescriptions     Pending Prescriptions Disp Refills    cyclobenzaprine (FLEXERIL) 10 MG tablet [Pharmacy Med Name: CYCLOBENZAPRINE HYDROCHLORIDE 10MG TABLET] 30 tablet 1     Sig: TAKE 1 TABLET BY MOUTH 3 TIMES DAILY AS NEEDED FOR MUSCLE SPASMS       Last Appointment Date: 2/27/2023  Next Appointment Date: 6/6/2023    Allergies   Allergen Reactions    Ambien [Zolpidem Tartrate]     Codeine     Lyrica [Pregabalin]     Morphine     Requip [Ropinirole Hcl]     Tizanidine      Terrible nightmares

## 2023-06-05 DIAGNOSIS — E55.9 VITAMIN D DEFICIENCY: ICD-10-CM

## 2023-06-05 DIAGNOSIS — E78.2 MIXED HYPERLIPIDEMIA: ICD-10-CM

## 2023-06-05 LAB
25(OH)D3 SERPL-MCNC: 96.2 NG/ML
ALBUMIN SERPL-MCNC: 3.7 G/DL (ref 3.5–5.2)
ALP SERPL-CCNC: 63 U/L (ref 35–104)
ALT SERPL-CCNC: 8 U/L (ref 5–33)
ANION GAP SERPL CALCULATED.3IONS-SCNC: 13 MMOL/L (ref 7–19)
AST SERPL-CCNC: 9 U/L (ref 5–32)
BASOPHILS # BLD: 0 K/UL (ref 0–0.2)
BASOPHILS NFR BLD: 0.7 % (ref 0–1)
BILIRUB SERPL-MCNC: 0.4 MG/DL (ref 0.2–1.2)
BUN SERPL-MCNC: 19 MG/DL (ref 8–23)
CALCIUM SERPL-MCNC: 9.5 MG/DL (ref 8.8–10.2)
CHLORIDE SERPL-SCNC: 103 MMOL/L (ref 98–111)
CHOLEST SERPL-MCNC: 211 MG/DL (ref 160–199)
CO2 SERPL-SCNC: 25 MMOL/L (ref 22–29)
CREAT SERPL-MCNC: 1.1 MG/DL (ref 0.5–0.9)
EOSINOPHIL # BLD: 0.2 K/UL (ref 0–0.6)
EOSINOPHIL NFR BLD: 3.9 % (ref 0–5)
ERYTHROCYTE [DISTWIDTH] IN BLOOD BY AUTOMATED COUNT: 13.7 % (ref 11.5–14.5)
GLUCOSE SERPL-MCNC: 107 MG/DL (ref 74–109)
HCT VFR BLD AUTO: 33.9 % (ref 37–47)
HDLC SERPL-MCNC: 40 MG/DL (ref 65–121)
HGB BLD-MCNC: 10.9 G/DL (ref 12–16)
IMM GRANULOCYTES # BLD: 0 K/UL
LDLC SERPL CALC-MCNC: 140 MG/DL
LYMPHOCYTES # BLD: 1.5 K/UL (ref 1.1–4.5)
LYMPHOCYTES NFR BLD: 32.8 % (ref 20–40)
MCH RBC QN AUTO: 31.1 PG (ref 27–31)
MCHC RBC AUTO-ENTMCNC: 32.2 G/DL (ref 33–37)
MCV RBC AUTO: 96.6 FL (ref 81–99)
MONOCYTES # BLD: 0.4 K/UL (ref 0–0.9)
MONOCYTES NFR BLD: 9.4 % (ref 0–10)
NEUTROPHILS # BLD: 2.4 K/UL (ref 1.5–7.5)
NEUTS SEG NFR BLD: 53 % (ref 50–65)
PLATELET # BLD AUTO: 174 K/UL (ref 130–400)
PMV BLD AUTO: 11.1 FL (ref 9.4–12.3)
POTASSIUM SERPL-SCNC: 4.1 MMOL/L (ref 3.5–5)
PROT SERPL-MCNC: 6.1 G/DL (ref 6.6–8.7)
RBC # BLD AUTO: 3.51 M/UL (ref 4.2–5.4)
SODIUM SERPL-SCNC: 141 MMOL/L (ref 136–145)
TRIGL SERPL-MCNC: 154 MG/DL (ref 0–149)
TSH SERPL DL<=0.005 MIU/L-ACNC: 3.92 UIU/ML (ref 0.27–4.2)
WBC # BLD AUTO: 4.6 K/UL (ref 4.8–10.8)

## 2023-06-06 ENCOUNTER — OFFICE VISIT (OUTPATIENT)
Dept: INTERNAL MEDICINE | Age: 83
End: 2023-06-06
Payer: MEDICARE

## 2023-06-06 VITALS
BODY MASS INDEX: 33.65 KG/M2 | SYSTOLIC BLOOD PRESSURE: 118 MMHG | HEART RATE: 76 BPM | WEIGHT: 184 LBS | OXYGEN SATURATION: 97 % | DIASTOLIC BLOOD PRESSURE: 64 MMHG

## 2023-06-06 DIAGNOSIS — F51.01 PRIMARY INSOMNIA: ICD-10-CM

## 2023-06-06 DIAGNOSIS — G44.89 OTHER HEADACHE SYNDROME: ICD-10-CM

## 2023-06-06 DIAGNOSIS — N18.31 STAGE 3A CHRONIC KIDNEY DISEASE (HCC): ICD-10-CM

## 2023-06-06 DIAGNOSIS — L57.0 KERATOSIS: ICD-10-CM

## 2023-06-06 DIAGNOSIS — E03.9 HYPOTHYROIDISM (ACQUIRED): ICD-10-CM

## 2023-06-06 DIAGNOSIS — F33.41 RECURRENT MAJOR DEPRESSIVE DISORDER, IN PARTIAL REMISSION (HCC): ICD-10-CM

## 2023-06-06 DIAGNOSIS — I10 PRIMARY HYPERTENSION: Primary | ICD-10-CM

## 2023-06-06 DIAGNOSIS — G89.4 CHRONIC PAIN SYNDROME: ICD-10-CM

## 2023-06-06 DIAGNOSIS — F41.9 ANXIETY: ICD-10-CM

## 2023-06-06 DIAGNOSIS — M54.2 NECK PAIN: ICD-10-CM

## 2023-06-06 DIAGNOSIS — E78.2 MIXED HYPERLIPIDEMIA: ICD-10-CM

## 2023-06-06 DIAGNOSIS — R41.3 MEMORY LOSS: ICD-10-CM

## 2023-06-06 DIAGNOSIS — K21.9 GASTROESOPHAGEAL REFLUX DISEASE WITHOUT ESOPHAGITIS: ICD-10-CM

## 2023-06-06 DIAGNOSIS — E55.9 VITAMIN D DEFICIENCY: ICD-10-CM

## 2023-06-06 LAB
BACTERIA #/AREA URNS HPF: ABNORMAL /HPF
BILIRUB UR QL STRIP: NEGATIVE
CLARITY UR: ABNORMAL
COLOR UR: YELLOW
GLUCOSE UR STRIP.AUTO-MCNC: NEGATIVE MG/DL
HGB UR STRIP.AUTO-MCNC: ABNORMAL MG/L
KETONES UR STRIP.AUTO-MCNC: NEGATIVE MG/DL
LEUKOCYTE ESTERASE UR QL STRIP.AUTO: ABNORMAL
NITRITE UR QL STRIP.AUTO: NEGATIVE
PH UR STRIP.AUTO: 5.5 [PH] (ref 5–8)
PROT UR STRIP.AUTO-MCNC: 30 MG/DL
RBC #/AREA URNS HPF: ABNORMAL /HPF (ref 0–2)
SP GR UR STRIP.AUTO: 1.02 (ref 1–1.03)
SQUAMOUS #/AREA URNS HPF: ABNORMAL /HPF
UROBILINOGEN UR STRIP.AUTO-MCNC: 0.2 E.U./DL
WBC #/AREA URNS HPF: ABNORMAL /HPF (ref 0–5)

## 2023-06-06 PROCEDURE — 3078F DIAST BP <80 MM HG: CPT | Performed by: NURSE PRACTITIONER

## 2023-06-06 PROCEDURE — G8399 PT W/DXA RESULTS DOCUMENT: HCPCS | Performed by: NURSE PRACTITIONER

## 2023-06-06 PROCEDURE — 1090F PRES/ABSN URINE INCON ASSESS: CPT | Performed by: NURSE PRACTITIONER

## 2023-06-06 PROCEDURE — 99214 OFFICE O/P EST MOD 30 MIN: CPT | Performed by: NURSE PRACTITIONER

## 2023-06-06 PROCEDURE — G8417 CALC BMI ABV UP PARAM F/U: HCPCS | Performed by: NURSE PRACTITIONER

## 2023-06-06 PROCEDURE — 3074F SYST BP LT 130 MM HG: CPT | Performed by: NURSE PRACTITIONER

## 2023-06-06 PROCEDURE — 1123F ACP DISCUSS/DSCN MKR DOCD: CPT | Performed by: NURSE PRACTITIONER

## 2023-06-06 PROCEDURE — G8427 DOCREV CUR MEDS BY ELIG CLIN: HCPCS | Performed by: NURSE PRACTITIONER

## 2023-06-06 PROCEDURE — 1036F TOBACCO NON-USER: CPT | Performed by: NURSE PRACTITIONER

## 2023-06-06 RX ORDER — SERTRALINE HYDROCHLORIDE 25 MG/1
25 TABLET, FILM COATED ORAL DAILY
Qty: 90 TABLET | Refills: 1 | Status: SHIPPED | OUTPATIENT
Start: 2023-06-06

## 2023-06-06 RX ORDER — LANSOPRAZOLE 30 MG/1
CAPSULE, DELAYED RELEASE ORAL
Qty: 90 CAPSULE | Refills: 1 | Status: SHIPPED | OUTPATIENT
Start: 2023-06-06

## 2023-06-06 RX ORDER — VENLAFAXINE 25 MG/1
25 TABLET ORAL DAILY
Qty: 90 TABLET | Refills: 1 | Status: SHIPPED | OUTPATIENT
Start: 2023-06-06

## 2023-06-06 RX ORDER — ERGOCALCIFEROL 1.25 MG/1
50000 CAPSULE ORAL WEEKLY
Qty: 12 CAPSULE | Refills: 1 | Status: SHIPPED | OUTPATIENT
Start: 2023-06-06

## 2023-06-06 RX ORDER — CYCLOBENZAPRINE HCL 10 MG
10 TABLET ORAL 3 TIMES DAILY PRN
Qty: 90 TABLET | Refills: 1 | Status: SHIPPED | OUTPATIENT
Start: 2023-06-06

## 2023-06-06 RX ORDER — SUMATRIPTAN 50 MG/1
TABLET, FILM COATED ORAL
Qty: 27 TABLET | Refills: 1
Start: 2023-06-06

## 2023-06-06 RX ORDER — BUTALBITAL, ACETAMINOPHEN AND CAFFEINE 50; 325; 40 MG/1; MG/1; MG/1
1 TABLET ORAL
Qty: 180 TABLET | Refills: 0 | Status: SHIPPED | OUTPATIENT
Start: 2023-06-06

## 2023-06-06 RX ORDER — SOLIFENACIN SUCCINATE 10 MG/1
10 TABLET, FILM COATED ORAL DAILY
Qty: 90 TABLET | Refills: 1 | Status: SHIPPED | OUTPATIENT
Start: 2023-06-06 | End: 2024-06-05

## 2023-06-06 RX ORDER — DONEPEZIL HYDROCHLORIDE 10 MG/1
10 TABLET, FILM COATED ORAL NIGHTLY
Qty: 90 TABLET | Refills: 1 | Status: SHIPPED | OUTPATIENT
Start: 2023-06-06 | End: 2023-12-03

## 2023-06-06 NOTE — PATIENT INSTRUCTIONS
Skin lesions;  you need to see dermatology   Hyperlipidemia; stable   Hypertension The current medical regimen is effective;  continue present plan and medications. Chronic back pain continue with pain management   Insomnia;  you can use both melatonin and benadryl  Dementia stable with aricept   GERd  refer to GI for enso   Hypothyroidism stable with current dose   Depression;  stable with current meds  Hemorrhoidectomy;  keep fu's with DR Tomás Collado;   #11 anxiety you will need to your doctor's levels regarding your Xanax  12.   Migraines Fioricet is from pain management and need to recall the prescription that I gave you because you had a pain agreement with the him  Vitamin D deficiency 50,000 weekly stable  Chronic kidney disease just be sure to drink plenty water your kidney function has dropped a little bit we will recheck in 3 months  UTI when I get the culture back I will call you with the medication you need for your UTI

## 2023-06-06 NOTE — PROGRESS NOTES
feeling good and staying healthy for life. 6)  Lose weight - when you shed extra fat an unnecessary pounds, you reduce the burden on your hear, lungs, blood vessels and skeleton. You give yourself the gift of active living, you lower your blood pressure and help yourself feel better. 7) Stop smoking - cigarette smokers have a higher risk of developing cardiovascular disease. If  You smoke, quitting is the best thing you can do for your health. Electronically signed by TAMIA Recinos on 6/7/2023 at 6:35 AM    @On this date 6/6/2023 I have spent 48 minutes reviewing previous notes, test results and face to face with the patient discussing the diagnosis and importance of compliance with the treatment plan as well as documenting on the day of the visit. @More than 50% of the time was spent counseling and coordinating care   EMRDragon/transcription disclaimer:  Much of this encounter note is electronic transcription/translation of spoken language to printed texts. The electronic translation of spoken language may be erroneous, or at times,nonsensical words or phrases may be inadvertently transcribed.   Although I have reviewed the note for such errors, some may still exist.

## 2023-06-07 PROBLEM — F51.01 PRIMARY INSOMNIA: Status: ACTIVE | Noted: 2023-06-07

## 2023-06-07 PROBLEM — L57.0 KERATOSIS: Status: ACTIVE | Noted: 2023-06-07

## 2023-06-07 ASSESSMENT — ENCOUNTER SYMPTOMS
CHOKING: 0
DIARRHEA: 0
BACK PAIN: 1
EYE ITCHING: 0
EYE DISCHARGE: 0
COUGH: 0
BLOOD IN STOOL: 0
ABDOMINAL PAIN: 0
CONSTIPATION: 0
WHEEZING: 0
SORE THROAT: 0
TROUBLE SWALLOWING: 0
STRIDOR: 0
VOMITING: 0
NAUSEA: 0
ABDOMINAL DISTENTION: 0
COLOR CHANGE: 0
SHORTNESS OF BREATH: 0

## 2023-06-07 NOTE — ASSESSMENT & PLAN NOTE
She is on Prevacid we are asking her to call Dr. Sasha Snowden to do an Endo to see if she has esophageal stricture with the sensations that she is having relieved with nitroglycerin

## 2023-06-08 LAB — BACTERIA UR CULT: NORMAL

## 2023-06-19 ENCOUNTER — TELEPHONE (OUTPATIENT)
Dept: OBSTETRICS AND GYNECOLOGY | Facility: CLINIC | Age: 83
End: 2023-06-19
Payer: MEDICARE

## 2023-06-19 ENCOUNTER — TELEPHONE (OUTPATIENT)
Dept: INTERNAL MEDICINE | Age: 83
End: 2023-06-19

## 2023-06-19 NOTE — TELEPHONE ENCOUNTER
S/w pt, states  prescribed Xanax tid and she has been taking it qid. Pt states the last time she ran out of her medication she started having grand mal seizures. Pt wanted to know if Blue Snowden would prescribe a week's worth for her to taper off of.

## 2023-06-19 NOTE — TELEPHONE ENCOUNTER
Patient called on call provider.   She has taken extra doses of her Xanax and has run out of her current Rx a week early.   She is advised that we cannot provider her with a refill at this time as she has a current active Rx.   She was advised earlier today and again at this time to contact her PCP office regarding management of her sleep and xanax use.    If she experiences withdrawal symptoms she will need to present to the ER.

## 2023-06-19 NOTE — TELEPHONE ENCOUNTER
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  Pt advised to call her surgeon regarding the unrelieved pain post op hemorrhoidectomy.  Pt voiced understanding to all conversation.    "

## 2023-07-10 RX ORDER — IPRATROPIUM BROMIDE 42 UG/1
SPRAY, METERED NASAL
Qty: 15 ML | Refills: 3 | Status: SHIPPED | OUTPATIENT
Start: 2023-07-10

## 2023-07-10 NOTE — TELEPHONE ENCOUNTER
Lillie Frederick called to request a refill on her medication.       Last office visit : 6/6/2023   Next office visit : 9/6/2023     Requested Prescriptions     Pending Prescriptions Disp Refills    ipratropium (ATROVENT) 0.06 % nasal spray [Pharmacy Med Name: IPRATROPIUM BROMIDE 0.06% SOLUTION] 15 mL 3     Sig: INSTILL 2 SPRAYS INTO EACH NOSTRIL TWICE DAILY            April Lara [place], [place]

## 2023-07-24 ENCOUNTER — TELEPHONE (OUTPATIENT)
Dept: INTERNAL MEDICINE | Age: 83
End: 2023-07-24

## 2023-07-24 NOTE — TELEPHONE ENCOUNTER
Sw pt she states that she was recently referred to dr Berna Correa for endoscopy , and has decided that at this time she doesn't need it . Pt states that she will discuss this more with you at her next visit .

## 2023-08-01 DIAGNOSIS — M54.2 NECK PAIN: ICD-10-CM

## 2023-08-01 RX ORDER — CYCLOBENZAPRINE HCL 10 MG
TABLET ORAL
Qty: 30 TABLET | Refills: 1 | Status: SHIPPED | OUTPATIENT
Start: 2023-08-01

## 2023-08-01 NOTE — TELEPHONE ENCOUNTER
Flavio Velazquez called requesting a refill of the below medication which has been pended for you:     Requested Prescriptions     Pending Prescriptions Disp Refills    cyclobenzaprine (FLEXERIL) 10 MG tablet [Pharmacy Med Name: CYCLOBENZAPRINE HYDROCHLORIDE 10MG TABLET] 30 tablet 1     Sig: TAKE 1 TABLET BY MOUTH THREE TIMES DAILY AS NEEDED FOR MUSCLE SPASMS.        Last Appointment Date: 6/6/2023  Next Appointment Date: 9/6/2023    Allergies   Allergen Reactions    Ambien [Zolpidem Tartrate]     Codeine     Lyrica [Pregabalin]     Morphine     Requip [Ropinirole Hcl]     Tizanidine      Terrible nightmares

## 2023-08-03 ENCOUNTER — TELEPHONE (OUTPATIENT)
Dept: INTERNAL MEDICINE | Age: 83
End: 2023-08-03

## 2023-08-03 NOTE — TELEPHONE ENCOUNTER
----- Message from Brandyn Spivey sent at 8/3/2023  1:01 PM CDT -----  Subject: Message to Provider    QUESTIONS  Information for Provider? Patient cancelled her appointment with Dr. Carmencita Harvey. It hurts her too much to go out. She wanted to let the nurse know.  ---------------------------------------------------------------------------  --------------  Nini [place] Jesse  [phone]; OK to leave message on voicemail  ---------------------------------------------------------------------------  --------------  SCRIPT ANSWERS  Relationship to Patient?  Self

## 2023-08-07 RX ORDER — LEVOTHYROXINE SODIUM 0.05 MG/1
TABLET ORAL
Qty: 90 TABLET | Refills: 3 | Status: SHIPPED | OUTPATIENT
Start: 2023-08-07

## 2023-08-07 NOTE — TELEPHONE ENCOUNTER
Ursula Perez called requesting a refill of the below medication which has been pended for you:     Requested Prescriptions     Pending Prescriptions Disp Refills    levothyroxine (SYNTHROID) 50 MCG tablet [Pharmacy Med Name: L-THYROXINE (SYNTHROID) TABS 50MCG] 90 tablet 3     Sig: TAKE 1 TABLET DAILY       Last Appointment Date: 6/6/2023  Next Appointment Date: 9/6/2023    Allergies   Allergen Reactions    Ambien [Zolpidem Tartrate]     Codeine     Lyrica [Pregabalin]     Morphine     Requip [Ropinirole Hcl]     Tizanidine      Terrible nightmares

## 2023-08-22 ENCOUNTER — TELEPHONE (OUTPATIENT)
Dept: INTERNAL MEDICINE | Age: 83
End: 2023-08-22

## 2023-08-22 RX ORDER — NYSTATIN 100000 U/G
CREAM TOPICAL
Qty: 30 G | Refills: 1 | Status: SHIPPED | OUTPATIENT
Start: 2023-08-22

## 2023-08-22 NOTE — TELEPHONE ENCOUNTER
Sw pt she states that she has breakdown underneath abdomen .  Pt is requesting antifungal cream to help with this she would like it sent to [facility] please advise

## 2023-08-22 NOTE — PROGRESS NOTES
Social work consult requested due to high distress score-today is patient's first radiation tx- She is somewhat anxious and has many questions.   Assisted patient in writing questions that can be asked during appt with dr sanchez tomorrow (how tx works, what are side effects, marking on stomach).  She is reporting that her days and nights are mixed up and would like to consider using sleep medicine to help get back on track.   She had surgery in Dec for uterine cancer and will be doing radiation.  She currently takes oxycotin for back pain which she reports contributes to sleep issues.  She no longer takes anti-depressants but has significant stress in her life as her daughter is an active alcoholic.  Inquired about al-anon, aa meetings for her daughter, and other resources that could be helpful.  She reports feeling like she has a good support system at this time of  and sister.  Gave patient education/informatoin and printed off local schedule for al anon and aa meetings, ladies living freely program, medicaid application, and counseling resources.  Stressed importance of taking care of herself.   Will consult with staff regarding patient needs.

## 2023-08-24 ENCOUNTER — OFFICE VISIT (OUTPATIENT)
Dept: GASTROENTEROLOGY | Facility: CLINIC | Age: 83
End: 2023-08-24
Payer: MEDICARE

## 2023-08-24 VITALS
HEIGHT: 62 IN | SYSTOLIC BLOOD PRESSURE: 140 MMHG | DIASTOLIC BLOOD PRESSURE: 84 MMHG | OXYGEN SATURATION: 96 % | BODY MASS INDEX: 34.04 KG/M2 | WEIGHT: 185 LBS | TEMPERATURE: 96.9 F | HEART RATE: 81 BPM

## 2023-08-24 DIAGNOSIS — K21.9 GASTROESOPHAGEAL REFLUX DISEASE, UNSPECIFIED WHETHER ESOPHAGITIS PRESENT: Primary | ICD-10-CM

## 2023-08-24 PROCEDURE — 3077F SYST BP >= 140 MM HG: CPT | Performed by: NURSE PRACTITIONER

## 2023-08-24 PROCEDURE — 1159F MED LIST DOCD IN RCRD: CPT | Performed by: NURSE PRACTITIONER

## 2023-08-24 PROCEDURE — 99214 OFFICE O/P EST MOD 30 MIN: CPT | Performed by: NURSE PRACTITIONER

## 2023-08-24 PROCEDURE — 1160F RVW MEDS BY RX/DR IN RCRD: CPT | Performed by: NURSE PRACTITIONER

## 2023-08-24 PROCEDURE — 3079F DIAST BP 80-89 MM HG: CPT | Performed by: NURSE PRACTITIONER

## 2023-08-24 NOTE — PROGRESS NOTES
Chief Complaint   Patient presents with    Endoscopy     Needs endo       PCP: Olivia Mora APRN  REFER: No ref. provider found    Subjective     HPI    Jennifer Gomes presents to office with complain of feeling air bubble in mid esophagus.    Stopping drinking out of a straw has helped not have air bubble sensation as often.  Temperature of drink does not effect bubble sensation.  No dysphagia.    She is compliant with prevacid.  She will occasionally have to take 2 per day.  Prevacid controls acid reflux.    No nausea/vomitting.   She has taken prevacid for over 5 years.  Utilizes nsaids on daily basis.  No bright red blood per rectum, no melena.    Bowels do not move regular.     COLONOSCOPY (10/01/2021 10:14)     UPPER GI ENDOSCOPY (2017 11:11) - superficial gastric ulcer     Past Medical History:   Diagnosis Date    Anxiety     Arthritis     Bronchitis     Cancer     uterine    Chronic pain     Depression     Disease of thyroid gland     Fibromyalgia     GERD (gastroesophageal reflux disease)     Headache     History of transfusion     AS     Hyperlipidemia     Hypertension     Incontinence     Insomnia     Leg pain     Lumbar stenosis     Migraines     Peptic ulcer     Restless legs     Sleep apnea     NO C-PAP    UTI (urinary tract infection)     Vaginal bleeding        Past Surgical History:   Procedure Laterality Date    BLADDER REPAIR      MESH HAD TO BE REMOVED IN     BREAST BIOPSY Right 2017    benign    BREAST CYST EXCISION Left     CARDIAC CATHETERIZATION      CARPAL TUNNEL RELEASE      CATARACT EXTRACTION W/ INTRAOCULAR LENS  IMPLANT, BILATERAL      COLONOSCOPY      COLONOSCOPY N/A 10/01/2021    Procedure: COLONOSCOPY WITH ANESTHESIA;  Surgeon: Tom Velasco DO;  Location: [facility] ENDOSCOPY;  Service: Gastroenterology;  Laterality: N/A;  pre: change in bowel habits  post: diverticulosis. hemorrhoids.   Olivia Mora APRN        CYSTECTOMY      D & C  HYSTEROSCOPY N/A 11/06/2017    Procedure: DILATATION AND CURETTAGE HYSTEROSCOPY;  Surgeon: Shasta Madrigal MD;  Location:  [facility] OR;  Service:     DILATION AND CURETTAGE, DIAGNOSTIC / THERAPEUTIC  2008    ENDOSCOPY  09/23/2010    Short segment of Arriola's,Moderate chroninc esophagogastritis and negative H.pylori    ENDOSCOPY N/A 09/25/2017    Procedure: ESOPHAGOGASTRODUODENOSCOPY WITH ANESTHESIA;  Surgeon: Tom Velasco DO;  Location:  [facility] ENDOSCOPY;  Service:     EYE SURGERY      RETINA    HEMORRHOIDECTOMY SIGMOIDOSCOPY N/A 3/21/2023    Procedure: HEMORRHOIDECTOMY WITH EXAM UNDER ANESTHESIA;  Surgeon: Holly Chavez MD;  Location: [facility] OR;  Service: General;  Laterality: N/A;    HYSTERECTOMY  12/20/2017    ORIF TIBIA/FIBULA FRACTURES Left 2000    TRANSVAGINAL TAPING SUSPENSION N/A 11/06/2017    Procedure: VAGINAL MESH REVISION;  Surgeon: Shasta Madrigal MD;  Location:  [facility] OR;  Service:     VAGINAL MESH REVISION  2013       Outpatient Medications Marked as Taking for the 8/24/23 encounter (Office Visit) with Jorge Alberto Krishna APRN   Medication Sig Dispense Refill    acetaminophen (Tylenol) 325 MG tablet Take 3 tablets by mouth Every 8 (Eight) Hours. Take every 8 hours for 3 days then take prn as needed. 100 tablet 2    aspirin 325 MG tablet Take 1 tablet by mouth Daily.      atorvastatin (LIPITOR) 40 MG tablet Take 1 tablet by mouth Daily.      bumetanide (BUMEX) 1 MG tablet Take 1 tablet by mouth As Needed.      butalbital-acetaminophen-caffeine (FIORICET, ESGIC) -40 MG per tablet Take 1 tablet by mouth 2 (Two) Times a Day As Needed for Headache.      carvedilol (COREG) 12.5 MG tablet Take 1 tablet by mouth 2 (Two) Times a Day With Meals.      cyclobenzaprine (FLEXERIL) 10 MG tablet 2 (Two) Times a Day As Needed.      diclofenac (VOLTAREN) 75 MG EC tablet Take 1 tablet by mouth 2 (Two) Times a Day.      donepezil (ARICEPT) 10 MG tablet Take 1 tablet by mouth Every Night.       ergocalciferol (ERGOCALCIFEROL) 48696 units capsule Take 1 capsule by mouth 1 (One) Time Per Week. Saturday      GNP Hydrocortisone Max St 1 % ointment APPLY TOPICALLY 3 TIMES DAILY.      ibuprofen (Motrin IB) 200 MG tablet Take 3 tablets by mouth Every 8 (Eight) Hours. Take every 8 hours for three days then take as needed. 100 tablet 2    ipratropium (ATROVENT) 0.06 % nasal spray 2 sprays.      LACTASE ENZYME PO Take 3 tablets by mouth As Needed (takes before dairy products).      lansoprazole (PREVACID) 30 MG capsule Take 1 capsule by mouth Every Morning.      levothyroxine (SYNTHROID, LEVOTHROID) 50 MCG tablet Take 1 tablet by mouth Every Morning.      Lidocaine, Anorectal, (LMX 5) 5 % cream cream       ondansetron (Zofran) 4 MG tablet Take 1 tablet by mouth Every 8 (Eight) Hours As Needed for Nausea or Vomiting. 15 tablet 0    oxyCODONE (ROXICODONE) 15 MG immediate release tablet Every 6 (Six) Hours As Needed.      oxyCODONE (Roxicodone) 5 MG immediate release tablet Take 1 tablet by mouth Every 8 (Eight) Hours As Needed for Severe Pain. 10 tablet 0    polyethylene glycol (MiraLax) 17 g packet Take 17 g by mouth Daily. 100 each 3    sertraline (ZOLOFT) 25 MG tablet Take 1 tablet by mouth Daily. 90 tablet 3    solifenacin (VESICARE) 10 MG tablet Take 1 tablet by mouth Daily. 30 tablet 11    SUMAtriptan (IMITREX) 50 MG tablet Take 1 tablet by mouth 2 (Two) Times a Day As Needed for Migraine. 30 tablet 3    venlafaxine (EFFEXOR) 25 MG tablet Take 1 tablet by mouth Daily. 90 tablet 3       Allergies   Allergen Reactions    Ropinirole Hcl Shortness Of Breath    Codeine Itching and Mental Status Change    Definity [Perflutren Lipid Microsphere] Other (See Comments)     Severe back pain    Ambien [Zolpidem] Other (See Comments)     HYPER     Eszopiclone Other (See Comments)     MAKES PT HYPER     Pregabalin Dizziness    Tizanidine Other (See Comments)     Terrible nightmares       Social History     Socioeconomic  "History    Marital status:    Tobacco Use    Smoking status: Never    Smokeless tobacco: Never   Vaping Use    Vaping Use: Never used   Substance and Sexual Activity    Alcohol use: Not Currently     Comment: occasional    Drug use: No    Sexual activity: Defer       Review of Systems   Constitutional:  Negative for fever and unexpected weight change.   HENT:  Negative for trouble swallowing.    Respiratory:  Negative for shortness of breath.    Cardiovascular:  Negative for chest pain.   Gastrointestinal:  Negative for abdominal pain and anal bleeding.     Objective     Vitals:    08/24/23 1329   BP: 140/84   Pulse: 81   Temp: 96.9 øF (36.1 øC)   SpO2: 96%   Weight: 83.9 kg (185 lb)   Height: 157.5 cm (62\")     Body mass index is 33.84 kg/mý.    Physical Exam  Constitutional:       Appearance: Normal appearance. She is well-developed.   Eyes:      General: No scleral icterus.  Cardiovascular:      Heart sounds: Normal heart sounds. No murmur heard.  Pulmonary:      Effort: Pulmonary effort is normal.   Abdominal:      General: Bowel sounds are normal. There is no distension.      Palpations: Abdomen is soft.      Tenderness: There is no abdominal tenderness. There is no guarding.   Skin:     General: Skin is warm and dry.      Coloration: Skin is not jaundiced.   Neurological:      Mental Status: She is alert.   Psychiatric:         Behavior: Behavior is cooperative.       Imaging Results (Most Recent)       None            Body mass index is 33.84 kg/mý.    Assessment & Plan     Diagnoses and all orders for this visit:    1. Gastroesophageal reflux disease, unspecified whether esophagitis present (Primary)  -     Case Request; Standing  -     Implement Anesthesia Orders Day of Procedure; Standing  -     Obtain Informed Consent; Standing  -     Case Request         ESOPHAGOGASTRODUODENOSCOPY WITH ANESTHESIA (N/A)    Continue prevacid as currently prescribed    Take PPI 30 min prior to breakfast   Decrease " caffeine, nicotine, etoh- all contribute to acid reflux  Do not eat 2-3 hours within lying down, elevated HOB 4-6 inches      Advised pt to stop use of NSAIDs, Fish Oil, and MV 5 days prior to procedure, per Dr Velasco protocol.  Tylenol based products are ok to take.  Pt verbalized understanding.    The risks of the endoscopy were discussed in detail.  We discussed the risks of perforation (one out of 4230-3490, riskier with dilation), bleeding (one out of 500), and the rare risks of infection, adverse reaction to anesthesia, respiratory failure, cardiac failure including MI and adverse reaction to medications, etc.  We discussed consequences that could occur if a risk were to develop such as the need for hospitalization, blood transfusion, surgical intervention, medications, pain and disability and death.  Alternatives include not doing anything, or pursuing an UGI series which only offers a diagnosis with potential less accuracy compared to EGD.  The patient verbalizes understanding and agrees to proceed.       Jorge Alberto Krishna, APRN  08/24/23          There are no Patient Instructions on file for this visit.

## 2023-09-04 ENCOUNTER — HOSPITAL ENCOUNTER (INPATIENT)
Facility: HOSPITAL | Age: 83
LOS: 6 days | Discharge: HOME HEALTH | DRG: 418 | End: 2023-09-10
Attending: FAMILY MEDICINE | Admitting: HOSPITALIST
Payer: MEDICARE

## 2023-09-04 ENCOUNTER — APPOINTMENT (OUTPATIENT)
Dept: CT IMAGING | Facility: HOSPITAL | Age: 83
DRG: 418 | End: 2023-09-04
Payer: MEDICARE

## 2023-09-04 ENCOUNTER — APPOINTMENT (OUTPATIENT)
Dept: GENERAL RADIOLOGY | Facility: HOSPITAL | Age: 83
DRG: 418 | End: 2023-09-04
Payer: MEDICARE

## 2023-09-04 DIAGNOSIS — N30.00 ACUTE CYSTITIS WITHOUT HEMATURIA: Primary | ICD-10-CM

## 2023-09-04 DIAGNOSIS — R29.6 FREQUENT FALLS: ICD-10-CM

## 2023-09-04 DIAGNOSIS — M48.061 SPINAL STENOSIS, LUMBAR REGION, WITHOUT NEUROGENIC CLAUDICATION: ICD-10-CM

## 2023-09-04 DIAGNOSIS — K81.9 CHOLECYSTITIS: ICD-10-CM

## 2023-09-04 DIAGNOSIS — Z78.9 DECREASED ACTIVITIES OF DAILY LIVING (ADL): ICD-10-CM

## 2023-09-04 DIAGNOSIS — E66.01 OBESITY, CLASS III, BMI 40-49.9 (MORBID OBESITY): ICD-10-CM

## 2023-09-04 DIAGNOSIS — K81.0 ACUTE CHOLECYSTITIS: ICD-10-CM

## 2023-09-04 DIAGNOSIS — I50.9 ACUTE CONGESTIVE HEART FAILURE, UNSPECIFIED HEART FAILURE TYPE: ICD-10-CM

## 2023-09-04 DIAGNOSIS — G89.4 CHRONIC PAIN SYNDROME: Chronic | ICD-10-CM

## 2023-09-04 DIAGNOSIS — R62.7 FAILURE TO THRIVE IN ADULT: ICD-10-CM
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
## 2023-09-04 DIAGNOSIS — R13.10 DYSPHAGIA, UNSPECIFIED TYPE: ICD-10-CM

## 2023-09-04 DIAGNOSIS — Z74.09 IMPAIRED MOBILITY: ICD-10-CM

## 2023-09-04 DIAGNOSIS — M62.82 NON-TRAUMATIC RHABDOMYOLYSIS: ICD-10-CM

## 2023-09-04 DIAGNOSIS — N17.9 AKI (ACUTE KIDNEY INJURY): ICD-10-CM

## 2023-09-04 PROBLEM — N39.0 UTI (URINARY TRACT INFECTION): Status: ACTIVE | Noted: 2023-09-04

## 2023-09-04 LAB
ALBUMIN SERPL-MCNC: 3.1 G/DL (ref 3.5–5.2)
ALBUMIN/GLOB SERPL: 1 G/DL
ALP SERPL-CCNC: 91 U/L (ref 39–117)
ALT SERPL W P-5'-P-CCNC: 28 U/L (ref 1–33)
ANION GAP SERPL CALCULATED.3IONS-SCNC: 16 MMOL/L (ref 5–15)
ANISOCYTOSIS BLD QL: ABNORMAL
AST SERPL-CCNC: 43 U/L (ref 1–32)
BACTERIA UR QL AUTO: ABNORMAL /HPF
BILIRUB SERPL-MCNC: 0.4 MG/DL (ref 0–1.2)
BILIRUB UR QL STRIP: ABNORMAL
BUN SERPL-MCNC: 51 MG/DL (ref 8–23)
BUN/CREAT SERPL: 30.7 (ref 7–25)
BURR CELLS BLD QL SMEAR: ABNORMAL
CALCIUM SPEC-SCNC: 9.7 MG/DL (ref 8.6–10.5)
CHLORIDE SERPL-SCNC: 105 MMOL/L (ref 98–107)
CK SERPL-CCNC: 676 U/L (ref 20–180)
CLARITY UR: ABNORMAL
CO2 SERPL-SCNC: 17 MMOL/L (ref 22–29)
COLOR UR: ABNORMAL
CREAT SERPL-MCNC: 1.66 MG/DL (ref 0.57–1)
D-LACTATE SERPL-SCNC: 1.5 MMOL/L (ref 0.5–2)
D-LACTATE SERPL-SCNC: 2.1 MMOL/L (ref 0.5–2)
DEPRECATED RDW RBC AUTO: 51.4 FL (ref 37–54)
EGFRCR SERPLBLD CKD-EPI 2021: 31.1 ML/MIN/1.73
ERYTHROCYTE [DISTWIDTH] IN BLOOD BY AUTOMATED COUNT: 15 % (ref 12.3–15.4)
GEN 5 2HR TROPONIN T REFLEX: 21 NG/L
GLOBULIN UR ELPH-MCNC: 3 GM/DL
GLUCOSE SERPL-MCNC: 114 MG/DL (ref 65–99)
GLUCOSE UR STRIP-MCNC: NEGATIVE MG/DL
HCT VFR BLD AUTO: 29.5 % (ref 34–46.6)
HGB BLD-MCNC: 9.6 G/DL (ref 12–15.9)
HGB UR QL STRIP.AUTO: ABNORMAL
HYALINE CASTS UR QL AUTO: ABNORMAL /LPF
KETONES UR QL STRIP: ABNORMAL
LEUKOCYTE ESTERASE UR QL STRIP.AUTO: ABNORMAL
LIPASE SERPL-CCNC: 7 U/L (ref 13–60)
LYMPHOCYTES # BLD MANUAL: 1.44 10*3/MM3 (ref 0.7–3.1)
LYMPHOCYTES NFR BLD MANUAL: 4 % (ref 5–12)
MAGNESIUM SERPL-MCNC: 2.2 MG/DL (ref 1.6–2.4)
MAGNESIUM SERPL-MCNC: 2.4 MG/DL (ref 1.6–2.4)
MCH RBC QN AUTO: 30.9 PG (ref 26.6–33)
MCHC RBC AUTO-ENTMCNC: 32.5 G/DL (ref 31.5–35.7)
MCV RBC AUTO: 94.9 FL (ref 79–97)
METAMYELOCYTES NFR BLD MANUAL: 3 % (ref 0–0)
MONOCYTES # BLD: 0.82 10*3/MM3 (ref 0.1–0.9)
NEUTROPHILS # BLD AUTO: 17.73 10*3/MM3 (ref 1.7–7)
NEUTROPHILS NFR BLD MANUAL: 52 % (ref 42.7–76)
NEUTS BAND NFR BLD MANUAL: 34 % (ref 0–5)
NITRITE UR QL STRIP: POSITIVE
NT-PROBNP SERPL-MCNC: 2431 PG/ML (ref 0–1800)
PH UR STRIP.AUTO: 5.5 [PH] (ref 5–8)
PLAT MORPH BLD: NORMAL
PLATELET # BLD AUTO: 182 10*3/MM3 (ref 140–450)
PMV BLD AUTO: 12.9 FL (ref 6–12)
POIKILOCYTOSIS BLD QL SMEAR: ABNORMAL
POLYCHROMASIA BLD QL SMEAR: ABNORMAL
POTASSIUM SERPL-SCNC: 3.1 MMOL/L (ref 3.5–5.2)
PROT SERPL-MCNC: 6.1 G/DL (ref 6–8.5)
PROT UR QL STRIP: ABNORMAL
RBC # BLD AUTO: 3.11 10*6/MM3 (ref 3.77–5.28)
RBC # UR STRIP: ABNORMAL /HPF
REF LAB TEST METHOD: ABNORMAL
SODIUM SERPL-SCNC: 138 MMOL/L (ref 136–145)
SP GR UR STRIP: 1.02 (ref 1–1.03)
SQUAMOUS #/AREA URNS HPF: ABNORMAL /HPF
TROPONIN T DELTA: -2 NG/L
TROPONIN T SERPL HS-MCNC: 23 NG/L
UROBILINOGEN UR QL STRIP: ABNORMAL
VARIANT LYMPHS NFR BLD MANUAL: 7 % (ref 19.6–45.3)
WBC # UR STRIP: ABNORMAL /HPF
WBC MORPH BLD: NORMAL
WBC NRBC COR # BLD: 20.62 10*3/MM3 (ref 3.4–10.8)

## 2023-09-04 PROCEDURE — 84484 ASSAY OF TROPONIN QUANT: CPT

## 2023-09-04 PROCEDURE — 72128 CT CHEST SPINE W/O DYE: CPT

## 2023-09-04 PROCEDURE — 83735 ASSAY OF MAGNESIUM: CPT

## 2023-09-04 PROCEDURE — 81001 URINALYSIS AUTO W/SCOPE: CPT

## 2023-09-04 PROCEDURE — 25010000002 CEFTRIAXONE PER 250 MG

## 2023-09-04 PROCEDURE — 25010000002 ENOXAPARIN PER 10 MG: Performed by: FAMILY MEDICINE

## 2023-09-04 PROCEDURE — 85025 COMPLETE CBC W/AUTO DIFF WBC: CPT

## 2023-09-04 PROCEDURE — 80053 COMPREHEN METABOLIC PANEL: CPT

## 2023-09-04 PROCEDURE — 25010000002 HYDROMORPHONE PER 4 MG: Performed by: FAMILY MEDICINE

## 2023-09-04 PROCEDURE — 93005 ELECTROCARDIOGRAM TRACING: CPT

## 2023-09-04 PROCEDURE — 85007 BL SMEAR W/DIFF WBC COUNT: CPT

## 2023-09-04 PROCEDURE — 87077 CULTURE AEROBIC IDENTIFY: CPT

## 2023-09-04 PROCEDURE — 36415 COLL VENOUS BLD VENIPUNCTURE: CPT

## 2023-09-04 PROCEDURE — P9612 CATHETERIZE FOR URINE SPEC: HCPCS

## 2023-09-04 PROCEDURE — 82550 ASSAY OF CK (CPK): CPT

## 2023-09-04 PROCEDURE — 0 POTASSIUM CHLORIDE 10 MEQ/100ML SOLUTION: Performed by: FAMILY MEDICINE

## 2023-09-04 PROCEDURE — 73030 X-RAY EXAM OF SHOULDER: CPT

## 2023-09-04 PROCEDURE — 83735 ASSAY OF MAGNESIUM: CPT | Performed by: FAMILY MEDICINE

## 2023-09-04 PROCEDURE — 83880 ASSAY OF NATRIURETIC PEPTIDE: CPT

## 2023-09-04 PROCEDURE — 87186 SC STD MICRODIL/AGAR DIL: CPT

## 2023-09-04 PROCEDURE — 70450 CT HEAD/BRAIN W/O DYE: CPT

## 2023-09-04 PROCEDURE — 72131 CT LUMBAR SPINE W/O DYE: CPT

## 2023-09-04 PROCEDURE — 83690 ASSAY OF LIPASE: CPT

## 2023-09-04 PROCEDURE — 71045 X-RAY EXAM CHEST 1 VIEW: CPT

## 2023-09-04 PROCEDURE — 72125 CT NECK SPINE W/O DYE: CPT

## 2023-09-04 PROCEDURE — 99285 EMERGENCY DEPT VISIT HI MDM: CPT

## 2023-09-04 PROCEDURE — 87040 BLOOD CULTURE FOR BACTERIA: CPT

## 2023-09-04 PROCEDURE — 93010 ELECTROCARDIOGRAM REPORT: CPT | Performed by: INTERNAL MEDICINE

## 2023-09-04 PROCEDURE — 87086 URINE CULTURE/COLONY COUNT: CPT

## 2023-09-04 PROCEDURE — 83605 ASSAY OF LACTIC ACID: CPT

## 2023-09-04 PROCEDURE — 74176 CT ABD & PELVIS W/O CONTRAST: CPT

## 2023-09-04 RX ORDER — SODIUM CHLORIDE 9 MG/ML
40 INJECTION, SOLUTION INTRAVENOUS AS NEEDED
Status: DISCONTINUED | OUTPATIENT
Start: 2023-09-04 | End: 2023-09-10 | Stop reason: HOSPADM

## 2023-09-04 RX ORDER — POLYETHYLENE GLYCOL 3350 17 G/17G
17 POWDER, FOR SOLUTION ORAL DAILY PRN
Status: DISCONTINUED | OUTPATIENT
Start: 2023-09-04 | End: 2023-09-05

## 2023-09-04 RX ORDER — AMOXICILLIN 250 MG
2 CAPSULE ORAL 2 TIMES DAILY
Status: DISCONTINUED | OUTPATIENT
Start: 2023-09-04 | End: 2023-09-05

## 2023-09-04 RX ORDER — DONEPEZIL HYDROCHLORIDE 10 MG/1
10 TABLET, FILM COATED ORAL NIGHTLY
Status: DISCONTINUED | OUTPATIENT
Start: 2023-09-04 | End: 2023-09-10 | Stop reason: HOSPADM

## 2023-09-04 RX ORDER — NITROGLYCERIN 0.4 MG/1
0.4 TABLET SUBLINGUAL
Status: DISCONTINUED | OUTPATIENT
Start: 2023-09-04 | End: 2023-09-10 | Stop reason: HOSPADM

## 2023-09-04 RX ORDER — ONDANSETRON 2 MG/ML
4 INJECTION INTRAMUSCULAR; INTRAVENOUS EVERY 6 HOURS PRN
Status: DISCONTINUED | OUTPATIENT
Start: 2023-09-04 | End: 2023-09-10 | Stop reason: HOSPADM

## 2023-09-04 RX ORDER — ACETAMINOPHEN 325 MG/1
650 TABLET ORAL EVERY 4 HOURS PRN
Status: DISCONTINUED | OUTPATIENT
Start: 2023-09-04 | End: 2023-09-06

## 2023-09-04 RX ORDER — VENLAFAXINE 50 MG/1
25 TABLET ORAL DAILY
Status: DISCONTINUED | OUTPATIENT
Start: 2023-09-05 | End: 2023-09-10 | Stop reason: HOSPADM

## 2023-09-04 RX ORDER — HYDROMORPHONE HYDROCHLORIDE 1 MG/ML
0.5 INJECTION, SOLUTION INTRAMUSCULAR; INTRAVENOUS; SUBCUTANEOUS
Status: DISCONTINUED | OUTPATIENT
Start: 2023-09-04 | End: 2023-09-05

## 2023-09-04 RX ORDER — BISACODYL 10 MG
10 SUPPOSITORY, RECTAL RECTAL DAILY PRN
Status: DISCONTINUED | OUTPATIENT
Start: 2023-09-04 | End: 2023-09-05

## 2023-09-04 RX ORDER — LEVOTHYROXINE SODIUM 0.05 MG/1
50 TABLET ORAL EVERY MORNING
Status: DISCONTINUED | OUTPATIENT
Start: 2023-09-05 | End: 2023-09-10 | Stop reason: HOSPADM

## 2023-09-04 RX ORDER — ALPRAZOLAM 0.5 MG/1
1 TABLET ORAL 3 TIMES DAILY PRN
Status: DISCONTINUED | OUTPATIENT
Start: 2023-09-04 | End: 2023-09-06

## 2023-09-04 RX ORDER — CARVEDILOL 6.25 MG/1
12.5 TABLET ORAL 2 TIMES DAILY WITH MEALS
Status: DISCONTINUED | OUTPATIENT
Start: 2023-09-04 | End: 2023-09-10 | Stop reason: HOSPADM

## 2023-09-04 RX ORDER — BISACODYL 5 MG/1
5 TABLET, DELAYED RELEASE ORAL DAILY PRN
Status: DISCONTINUED | OUTPATIENT
Start: 2023-09-04 | End: 2023-09-10 | Stop reason: HOSPADM

## 2023-09-04 RX ORDER — ENOXAPARIN SODIUM 100 MG/ML
30 INJECTION SUBCUTANEOUS NIGHTLY
Status: DISCONTINUED | OUTPATIENT
Start: 2023-09-04 | End: 2023-09-05

## 2023-09-04 RX ORDER — SODIUM CHLORIDE 9 MG/ML
75 INJECTION, SOLUTION INTRAVENOUS CONTINUOUS
Status: DISCONTINUED | OUTPATIENT
Start: 2023-09-04 | End: 2023-09-08

## 2023-09-04 RX ORDER — POTASSIUM CHLORIDE 7.45 MG/ML
10 INJECTION INTRAVENOUS
Status: COMPLETED | OUTPATIENT
Start: 2023-09-04 | End: 2023-09-05

## 2023-09-04 RX ORDER — SODIUM CHLORIDE 0.9 % (FLUSH) 0.9 %
10 SYRINGE (ML) INJECTION AS NEEDED
Status: DISCONTINUED | OUTPATIENT
Start: 2023-09-04 | End: 2023-09-10 | Stop reason: HOSPADM

## 2023-09-04 RX ORDER — PANTOPRAZOLE SODIUM 40 MG/1
40 TABLET, DELAYED RELEASE ORAL
Status: DISCONTINUED | OUTPATIENT
Start: 2023-09-05 | End: 2023-09-10 | Stop reason: HOSPADM

## 2023-09-04 RX ORDER — ATORVASTATIN CALCIUM 40 MG/1
40 TABLET, FILM COATED ORAL NIGHTLY
Status: DISCONTINUED | OUTPATIENT
Start: 2023-09-04 | End: 2023-09-06

## 2023-09-04 RX ORDER — OXYCODONE HYDROCHLORIDE 5 MG/1
5 TABLET ORAL EVERY 8 HOURS PRN
Status: DISCONTINUED | OUTPATIENT
Start: 2023-09-04 | End: 2023-09-10 | Stop reason: HOSPADM

## 2023-09-04 RX ORDER — SODIUM CHLORIDE 0.9 % (FLUSH) 0.9 %
10 SYRINGE (ML) INJECTION EVERY 12 HOURS SCHEDULED
Status: DISCONTINUED | OUTPATIENT
Start: 2023-09-04 | End: 2023-09-10 | Stop reason: HOSPADM

## 2023-09-04 RX ORDER — ASPIRIN 325 MG
325 TABLET ORAL DAILY
Status: DISCONTINUED | OUTPATIENT
Start: 2023-09-04 | End: 2023-09-07

## 2023-09-04 RX ADMIN — SODIUM CHLORIDE 75 ML/HR: 9 INJECTION, SOLUTION INTRAVENOUS at 22:20

## 2023-09-04 RX ADMIN — SODIUM CHLORIDE, POTASSIUM CHLORIDE, SODIUM LACTATE AND CALCIUM CHLORIDE 1000 ML: 600; 310; 30; 20 INJECTION, SOLUTION INTRAVENOUS at 16:53

## 2023-09-04 RX ADMIN — HYDROMORPHONE HYDROCHLORIDE 0.5 MG: 1 INJECTION, SOLUTION INTRAMUSCULAR; INTRAVENOUS; SUBCUTANEOUS at 23:19

## 2023-09-04 RX ADMIN — Medication 10 ML: at 22:24

## 2023-09-04 RX ADMIN — CEFTRIAXONE 1000 MG: 1 INJECTION, POWDER, FOR SOLUTION INTRAMUSCULAR; INTRAVENOUS at 19:43

## 2023-09-04 RX ADMIN — ENOXAPARIN SODIUM 30 MG: 100 INJECTION SUBCUTANEOUS at 22:31

## 2023-09-04 RX ADMIN — POTASSIUM CHLORIDE 10 MEQ: 7.46 INJECTION, SOLUTION INTRAVENOUS at 22:25
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
## 2023-09-04 NOTE — ED NOTES
"PT NOTED TO BE COVERED IN URINE AND FECES. IT WAS REPORTED BY EMS THAT PT LAID IN THE FLOOR SINCE LAST NIGHT. PT STATES SHE LIVES WITH HER  AND DAUGHTER. WHEN PT WAS TELLING STAFF HOW SHE FELL SHE STATED \" I FELL AT SOMEONES FEET\" WHEN NURSE ASKED PT WHY THAT PERSON DID NOT HELP HER UP PT STATES HER  IS 86 YEARS OLD AND UNABLE. WHEN NURSE ASKED WHY HE DID NOT CALL FOR HELP, PT STATES, \" WEVE BEEN ARGUING FOR 2 DAYS\". NURSE ASKED WHERE PT LIVE IN DAUGHTER WAS AND PT STATES SHE WAS IN THE NEXT ROOM. WHEN NURSE ASKED WHY PT DAUGHTER DID NOT PICK HER UP PT STATES SHE DID NOT WANT TO GO TO THE HOSPITAL. EXPLAINED TO PT THAT IT WAS DANGEROUS TO LAY ON THE FLOOR FOR PROLONGED PERIODS OF TIME AND EMS COULD COME OUT FOR ASSISTANCE UP. WHEN PT ASKED IF THE DAUGHTER TOOK GOOD CARE OF HER PT STATES \"YES\". PT DENIES ANY UNSAFE FEELINGS AT HOME.   "

## 2023-09-04 NOTE — ED NOTES
CONTACTED, SPOKE WITH [facility]. PT INFO GIVEN. STATES SHE WILL FOLLOW UP THROUGHOUT ADMISSION OR TO NOTIFY IF SHE WAS DISCHARGED. SS STATES THEY DID NOT THINK APS WOULD COME OUT EMERGENTLY.

## 2023-09-05 PROBLEM — K52.9 COLITIS: Status: ACTIVE | Noted: 2023-09-05

## 2023-09-05 LAB
ANION GAP SERPL CALCULATED.3IONS-SCNC: 13 MMOL/L (ref 5–15)
ANISOCYTOSIS BLD QL: ABNORMAL
BUN SERPL-MCNC: 42 MG/DL (ref 8–23)
BUN/CREAT SERPL: 38.2 (ref 7–25)
BURR CELLS BLD QL SMEAR: ABNORMAL
CALCIUM SPEC-SCNC: 8.9 MG/DL (ref 8.6–10.5)
CHLORIDE SERPL-SCNC: 110 MMOL/L (ref 98–107)
CK SERPL-CCNC: 528 U/L (ref 20–180)
CO2 SERPL-SCNC: 17 MMOL/L (ref 22–29)
CREAT SERPL-MCNC: 1.1 MG/DL (ref 0.57–1)
DEPRECATED RDW RBC AUTO: 52.8 FL (ref 37–54)
EGFRCR SERPLBLD CKD-EPI 2021: 50.9 ML/MIN/1.73
ERYTHROCYTE [DISTWIDTH] IN BLOOD BY AUTOMATED COUNT: 15.2 % (ref 12.3–15.4)
GLUCOSE SERPL-MCNC: 116 MG/DL (ref 65–99)
HCT VFR BLD AUTO: 28 % (ref 34–46.6)
HGB BLD-MCNC: 8.8 G/DL (ref 12–15.9)
LYMPHOCYTES # BLD MANUAL: 0.27 10*3/MM3 (ref 0.7–3.1)
LYMPHOCYTES NFR BLD MANUAL: 6 % (ref 5–12)
MCH RBC QN AUTO: 30.3 PG (ref 26.6–33)
MCHC RBC AUTO-ENTMCNC: 31.4 G/DL (ref 31.5–35.7)
MCV RBC AUTO: 96.6 FL (ref 79–97)
MONOCYTES # BLD: 0.81 10*3/MM3 (ref 0.1–0.9)
NEUTROPHILS # BLD AUTO: 12.42 10*3/MM3 (ref 1.7–7)
NEUTROPHILS NFR BLD MANUAL: 74 % (ref 42.7–76)
NEUTS BAND NFR BLD MANUAL: 18 % (ref 0–5)
PHOSPHATE SERPL-MCNC: 3.1 MG/DL (ref 2.5–4.5)
PLAT MORPH BLD: NORMAL
PLATELET # BLD AUTO: 150 10*3/MM3 (ref 140–450)
PMV BLD AUTO: 11.6 FL (ref 6–12)
POIKILOCYTOSIS BLD QL SMEAR: ABNORMAL
POLYCHROMASIA BLD QL SMEAR: ABNORMAL
POTASSIUM SERPL-SCNC: 2.9 MMOL/L (ref 3.5–5.2)
QT INTERVAL: 338 MS
QTC INTERVAL: 436 MS
RBC # BLD AUTO: 2.9 10*6/MM3 (ref 3.77–5.28)
SODIUM SERPL-SCNC: 140 MMOL/L (ref 136–145)
VARIANT LYMPHS NFR BLD MANUAL: 2 % (ref 19.6–45.3)
WBC MORPH BLD: NORMAL
WBC NRBC COR # BLD: 13.5 10*3/MM3 (ref 3.4–10.8)

## 2023-09-05 PROCEDURE — 80048 BASIC METABOLIC PNL TOTAL CA: CPT | Performed by: FAMILY MEDICINE

## 2023-09-05 PROCEDURE — 25010000002 ENOXAPARIN PER 10 MG: Performed by: INTERNAL MEDICINE

## 2023-09-05 PROCEDURE — 85025 COMPLETE CBC W/AUTO DIFF WBC: CPT | Performed by: FAMILY MEDICINE

## 2023-09-05 PROCEDURE — 36415 COLL VENOUS BLD VENIPUNCTURE: CPT | Performed by: FAMILY MEDICINE

## 2023-09-05 PROCEDURE — 0 POTASSIUM CHLORIDE 10 MEQ/100ML SOLUTION: Performed by: FAMILY MEDICINE

## 2023-09-05 PROCEDURE — 82550 ASSAY OF CK (CPK): CPT | Performed by: FAMILY MEDICINE

## 2023-09-05 PROCEDURE — 85007 BL SMEAR W/DIFF WBC COUNT: CPT | Performed by: FAMILY MEDICINE

## 2023-09-05 PROCEDURE — 92610 EVALUATE SWALLOWING FUNCTION: CPT | Performed by: SPEECH-LANGUAGE PATHOLOGIST

## 2023-09-05 PROCEDURE — 0 POTASSIUM CHLORIDE 10 MEQ/100ML SOLUTION

## 2023-09-05 PROCEDURE — 84100 ASSAY OF PHOSPHORUS: CPT

## 2023-09-05 PROCEDURE — 25010000002 CEFTRIAXONE PER 250 MG: Performed by: FAMILY MEDICINE

## 2023-09-05 PROCEDURE — 25010000002 HYDROMORPHONE PER 4 MG: Performed by: FAMILY MEDICINE

## 2023-09-05 PROCEDURE — 25010000002 METRONIDAZOLE 500 MG/100ML SOLUTION

## 2023-09-05 RX ORDER — POLYETHYLENE GLYCOL 3350 17 G/17G
17 POWDER, FOR SOLUTION ORAL DAILY PRN
COMMUNITY

## 2023-09-05 RX ORDER — ENOXAPARIN SODIUM 100 MG/ML
40 INJECTION SUBCUTANEOUS NIGHTLY
Status: DISCONTINUED | OUTPATIENT
Start: 2023-09-05 | End: 2023-09-06

## 2023-09-05 RX ORDER — ACETAMINOPHEN 650 MG/1
650 SUPPOSITORY RECTAL EVERY 4 HOURS PRN
Status: DISCONTINUED | OUTPATIENT
Start: 2023-09-05 | End: 2023-09-06

## 2023-09-05 RX ORDER — POLYETHYLENE GLYCOL 3350 17 G/17G
17 POWDER, FOR SOLUTION ORAL DAILY
Status: DISCONTINUED | OUTPATIENT
Start: 2023-09-05 | End: 2023-09-10 | Stop reason: HOSPADM

## 2023-09-05 RX ORDER — AMOXICILLIN 250 MG
2 CAPSULE ORAL 2 TIMES DAILY
Status: DISCONTINUED | OUTPATIENT
Start: 2023-09-05 | End: 2023-09-10 | Stop reason: HOSPADM

## 2023-09-05 RX ORDER — POTASSIUM CHLORIDE 7.45 MG/ML
10 INJECTION INTRAVENOUS
Status: COMPLETED | OUTPATIENT
Start: 2023-09-05 | End: 2023-09-05

## 2023-09-05 RX ORDER — BISACODYL 10 MG
10 SUPPOSITORY, RECTAL RECTAL DAILY
Status: DISCONTINUED | OUTPATIENT
Start: 2023-09-05 | End: 2023-09-10 | Stop reason: HOSPADM

## 2023-09-05 RX ORDER — POTASSIUM CHLORIDE 750 MG/1
40 CAPSULE, EXTENDED RELEASE ORAL ONCE
Status: COMPLETED | OUTPATIENT
Start: 2023-09-05 | End: 2023-09-05

## 2023-09-05 RX ORDER — METRONIDAZOLE 500 MG/100ML
500 INJECTION, SOLUTION INTRAVENOUS EVERY 8 HOURS
Status: DISCONTINUED | OUTPATIENT
Start: 2023-09-05 | End: 2023-09-09

## 2023-09-05 RX ORDER — NYSTATIN 100000 U/G
1 CREAM TOPICAL 2 TIMES DAILY
Status: ON HOLD | COMMUNITY
End: 2023-09-25

## 2023-09-05 RX ADMIN — POTASSIUM CHLORIDE 10 MEQ: 7.46 INJECTION, SOLUTION INTRAVENOUS at 02:47

## 2023-09-05 RX ADMIN — ASPIRIN 325 MG: 325 TABLET, FILM COATED ORAL at 08:38

## 2023-09-05 RX ADMIN — SENNOSIDES AND DOCUSATE SODIUM 2 TABLET: 50; 8.6 TABLET ORAL at 20:58

## 2023-09-05 RX ADMIN — LEVOTHYROXINE SODIUM 50 MCG: 50 TABLET ORAL at 07:37

## 2023-09-05 RX ADMIN — HYDROMORPHONE HYDROCHLORIDE 0.5 MG: 1 INJECTION, SOLUTION INTRAMUSCULAR; INTRAVENOUS; SUBCUTANEOUS at 03:32

## 2023-09-05 RX ADMIN — VENLAFAXINE 25 MG: 50 TABLET ORAL at 08:34

## 2023-09-05 RX ADMIN — ACETAMINOPHEN 650 MG: 650 SUPPOSITORY RECTAL at 00:27

## 2023-09-05 RX ADMIN — POTASSIUM CHLORIDE 10 MEQ: 7.46 INJECTION, SOLUTION INTRAVENOUS at 00:26

## 2023-09-05 RX ADMIN — METRONIDAZOLE 500 MG: 500 INJECTION, SOLUTION INTRAVENOUS at 13:44

## 2023-09-05 RX ADMIN — Medication 10 ML: at 08:40

## 2023-09-05 RX ADMIN — CEFTRIAXONE SODIUM 1000 MG: 1 INJECTION, POWDER, FOR SOLUTION INTRAMUSCULAR; INTRAVENOUS at 20:57

## 2023-09-05 RX ADMIN — OXYCODONE HYDROCHLORIDE 5 MG: 5 TABLET ORAL at 18:55

## 2023-09-05 RX ADMIN — SERTRALINE HYDROCHLORIDE 25 MG: 50 TABLET, FILM COATED ORAL at 08:38

## 2023-09-05 RX ADMIN — METRONIDAZOLE 500 MG: 500 INJECTION, SOLUTION INTRAVENOUS at 22:07

## 2023-09-05 RX ADMIN — Medication 10 ML: at 20:58

## 2023-09-05 RX ADMIN — ENOXAPARIN SODIUM 40 MG: 100 INJECTION SUBCUTANEOUS at 20:58

## 2023-09-05 RX ADMIN — BISACODYL 10 MG: 10 SUPPOSITORY RECTAL at 08:32

## 2023-09-05 RX ADMIN — CARVEDILOL 12.5 MG: 6.25 TABLET, FILM COATED ORAL at 08:36

## 2023-09-05 RX ADMIN — ATORVASTATIN CALCIUM 40 MG: 40 TABLET, FILM COATED ORAL at 20:58

## 2023-09-05 RX ADMIN — SENNOSIDES AND DOCUSATE SODIUM 2 TABLET: 50; 8.6 TABLET ORAL at 08:42

## 2023-09-05 RX ADMIN — DONEPEZIL HYDROCHLORIDE 10 MG: 10 TABLET, FILM COATED ORAL at 20:58

## 2023-09-05 RX ADMIN — POTASSIUM CHLORIDE 10 MEQ: 7.46 INJECTION, SOLUTION INTRAVENOUS at 09:46

## 2023-09-05 RX ADMIN — POTASSIUM CHLORIDE 10 MEQ: 7.46 INJECTION, SOLUTION INTRAVENOUS at 06:55

## 2023-09-05 RX ADMIN — POTASSIUM CHLORIDE 10 MEQ: 7.46 INJECTION, SOLUTION INTRAVENOUS at 08:32

## 2023-09-05 RX ADMIN — POTASSIUM CHLORIDE 40 MEQ: 10 CAPSULE, COATED, EXTENDED RELEASE ORAL at 14:38

## 2023-09-05 RX ADMIN — CARVEDILOL 12.5 MG: 6.25 TABLET, FILM COATED ORAL at 17:39

## 2023-09-05 RX ADMIN — POLYETHYLENE GLYCOL 3350 17 G: 17 POWDER, FOR SOLUTION ORAL at 14:38

## 2023-09-05 RX ADMIN — POTASSIUM CHLORIDE 10 MEQ: 7.46 INJECTION, SOLUTION INTRAVENOUS at 10:55

## 2023-09-05 NOTE — PROGRESS NOTES
[facility] Medicine Services  INPATIENT PROGRESS NOTE    Patient Name: Jennifer Gomes  Date of Admission: 9/4/2023  Today's Date: 09/05/23  Length of Stay: 1  Primary Care Physician: Olivia Mora APRN    Subjective   Chief Complaint: Tired and back is sore  HPI   Patient examined lying in bed on 1 L nasal cannula.  She is in no acute distress.  She is fairly drowsy on my exam.  She tells me she has not had a bowel movement in quite some time although she is unsure exactly how long.  She is unable to fluently articulate to me the events immediately prior to arrival but it was reported that she was found down and was on the floor for quite some time at her home.  CK values noted, downward trending.  Antibiotic coverage both for UTI and potential colitis due to CT abdomen findings.  We will continue antibiotics and initiate bowel regimen.  Acute kidney injury is improving.  But I explained all of this to her, she seems agreeable.    Review of Systems   All pertinent negatives and positives are as above. All other systems have been reviewed and are negative unless otherwise stated.     Objective    Temp:  [97.4 °F (36.3 °C)-101.5 °F (38.6 °C)] 97.4 °F (36.3 °C)  Heart Rate:  [] 75  Resp:  [14-18] 16  BP: (109-141)/(42-83) 117/42  Physical Exam  Vitals and nursing note reviewed.   Constitutional:       Appearance: Normal appearance.      Comments: Up in bed, no acute distress, 1 L nasal cannula, no visitors at bedside   HENT:      Head: Normocephalic and atraumatic.   Cardiovascular:      Rate and Rhythm: Normal rate and regular rhythm.      Pulses: Normal pulses.      Heart sounds: Normal heart sounds. No murmur heard.    No friction rub.   Pulmonary:      Effort: Pulmonary effort is normal. No respiratory distress.      Breath sounds: Normal breath sounds. No stridor. No wheezing or rhonchi.   Abdominal:      General: Bowel sounds are normal.      Palpations: Abdomen  is soft.      Tenderness: There is no abdominal tenderness. There is no guarding.      Comments: Abdominal distention   Musculoskeletal:         General: No swelling or deformity. Normal range of motion.      Cervical back: Normal range of motion and neck supple. No rigidity or tenderness.      Right lower leg: No edema.      Left lower leg: No edema.   Skin:     General: Skin is warm and dry.      Capillary Refill: Capillary refill takes less than 2 seconds.      Coloration: Skin is not pale.      Findings: No bruising or erythema.   Neurological:      General: No focal deficit present.      Mental Status: She is alert and oriented to person, place, and time. Mental status is at baseline.      Sensory: No sensory deficit.      Gait: Gait normal.   Psychiatric:         Mood and Affect: Mood normal.         Behavior: Behavior normal.         Thought Content: Thought content normal.         Judgment: Judgment normal.       Results Review:  I have reviewed the labs, radiology results, and diagnostic studies.    Laboratory Data:   Results from last 7 days   Lab Units 09/05/23  0539 09/04/23  1657   WBC 10*3/mm3 13.50* 20.62*   HEMOGLOBIN g/dL 8.8* 9.6*   HEMATOCRIT % 28.0* 29.5*   PLATELETS 10*3/mm3 150 182        Results from last 7 days   Lab Units 09/05/23  0539 09/04/23  1657   SODIUM mmol/L 140 138   POTASSIUM mmol/L 2.9* 3.1*   CHLORIDE mmol/L 110* 105   CO2 mmol/L 17.0* 17.0*   BUN mg/dL 42* 51*   CREATININE mg/dL 1.10* 1.66*   CALCIUM mg/dL 8.9 9.7   BILIRUBIN mg/dL  --  0.4   ALK PHOS U/L  --  91   ALT (SGPT) U/L  --  28   AST (SGOT) U/L  --  43*   GLUCOSE mg/dL 116* 114*     Radiology Data:   Imaging Results (Last 24 Hours)       Procedure Component Value Units Date/Time    XR Shoulder 2+ View Left [[id number]] Collected: 09/04/23 1952     Updated: 09/04/23 1957    Narrative:      EXAM/TECHNIQUE: XR SHOULDER 2+ VW LEFT-     INDICATION: left shoulder pain     COMPARISON: None     FINDINGS:     Glenohumeral and  acromioclavicular joints are maintained. No acute  fracture or dislocation. Mild AC joint osteophyte development. No  suspicious bone lesion. Included portion of the LEFT chest appears  unremarkable. No acute soft tissue finding.       Impression:         No acute osseous findings.  This report was finalized on 09/04/2023 19:54 by Dr. Bakari Ramos MD.    CT Thoracic Spine Without Contrast [[id number]] Collected: 09/04/23 1859     Updated: 09/04/23 1905    Narrative:      EXAM/TECHNIQUE: CT thoracic spine without contrast     INDICATION: Spine fracture, thoracic, traumatic     COMPARISON: None     DLP: 963 mGy cm. Automated exposure control was also utilized to  decrease patient radiation dose.     FINDINGS:     Mild thoracic spine levocurvature, potentially related to patient  positioning. Thoracic kyphosis is maintained. No subluxations. Vertebral  body heights are maintained. No acute fracture. Mild multilevel thoracic  spine degenerative change. Included portion of the posterior lungs are  clear. No pneumothorax. Paravertebral soft tissues are unremarkable.  Esophagus is mildly patulous.       Impression:         1.  No acute fracture or subluxation.  2.  Mild multilevel cervical spine degenerative change.  This report was finalized on 09/04/2023 19:01 by Dr. Bakari Ramos MD.    CT Lumbar Spine Without Contrast [[id number]] Collected: 09/04/23 1852     Updated: 09/04/23 1901    Narrative:      EXAM/TECHNIQUE:  1. CT abdomen pelvis without contrast  2. CT lumbar spine without contrast     INDICATION: Fall, abdominal pain, back pain     COMPARISON: 02/08/2022     DLP: 252 mGy cm. Automated exposure control was also utilized to  decrease patient radiation dose.     FINDINGS:     CT abdomen pelvis without contrast:     Mild atelectasis in the included lung bases. The unenhanced liver,  gallbladder, adrenal glands, and pancreas are unremarkable. Gallbladder  is distended without evidence of wall thickening or  gallstones. No large  renal lesion. No urolithiasis or hydronephrosis. No focal urinary  bladder abnormality.     Large volume stool in the rectum and distal colon. Long segment wall  thickening of the sigmoid colon and distal descending colon with  surrounding fat stranding. A few scattered colonic diverticula are also  present within this region. The ascending and transverse colon appear  unremarkable. No secondary signs of appendicitis. No small bowel  distention or evidence of active small bowel inflammation.     No ascites or free pelvic fluid. No pelvic mass or pelvic collection.  Prior hysterectomy.     Normal caliber abdominal aorta with atherosclerotic calcification. No  enlarged abdominal or pelvic lymph nodes. No acute pelvic or hip  fracture. Sacrum appears intact. SI joints and pubic symphysis are  intact.     CT lumbar spine without contrast:     5 lumbar type vertebral bodies. Grade 1 anterolisthesis of L4 on L5,  likely degenerative in nature. Lumbar vertebral body heights are  maintained. No acute fracture. Moderate multilevel lumbar spine  degenerative change with multilevel central canal or neural foraminal  stenosis.       Impression:         1.  No acute traumatic finding in the abdomen or pelvis.     2.  No acute lumbar spine fracture.     3.  Large volume stool in the rectum and distal colon. Wall thickening  and inflammation of the sigmoid colon and distal descending colon,  compatible with acute colitis. This may be related to stercoral colitis  although differential also includes infectious colitis and acute  diverticulitis given the presence of small diverticula.     4.  Multilevel lumbar spine degenerative change and grade 1  anterolisthesis of L4 on L5 which is likely degenerative.     5.  Distended gallbladder without evidence of acute cholecystitis.  This report was finalized on 09/04/2023 18:58 by Dr. Bakari Ramos MD.    CT Abdomen Pelvis Without Contrast [[id number]] Collected:  09/04/23 1852     Updated: 09/04/23 1901    Narrative:      EXAM/TECHNIQUE:  1. CT abdomen pelvis without contrast  2. CT lumbar spine without contrast     INDICATION: Fall, abdominal pain, back pain     COMPARISON: 02/08/2022     DLP: 252 mGy cm. Automated exposure control was also utilized to  decrease patient radiation dose.     FINDINGS:     CT abdomen pelvis without contrast:     Mild atelectasis in the included lung bases. The unenhanced liver,  gallbladder, adrenal glands, and pancreas are unremarkable. Gallbladder  is distended without evidence of wall thickening or gallstones. No large  renal lesion. No urolithiasis or hydronephrosis. No focal urinary  bladder abnormality.     Large volume stool in the rectum and distal colon. Long segment wall  thickening of the sigmoid colon and distal descending colon with  surrounding fat stranding. A few scattered colonic diverticula are also  present within this region. The ascending and transverse colon appear  unremarkable. No secondary signs of appendicitis. No small bowel  distention or evidence of active small bowel inflammation.     No ascites or free pelvic fluid. No pelvic mass or pelvic collection.  Prior hysterectomy.     Normal caliber abdominal aorta with atherosclerotic calcification. No  enlarged abdominal or pelvic lymph nodes. No acute pelvic or hip  fracture. Sacrum appears intact. SI joints and pubic symphysis are  intact.     CT lumbar spine without contrast:     5 lumbar type vertebral bodies. Grade 1 anterolisthesis of L4 on L5,  likely degenerative in nature. Lumbar vertebral body heights are  maintained. No acute fracture. Moderate multilevel lumbar spine  degenerative change with multilevel central canal or neural foraminal  stenosis.       Impression:         1.  No acute traumatic finding in the abdomen or pelvis.     2.  No acute lumbar spine fracture.     3.  Large volume stool in the rectum and distal colon. Wall thickening  and  inflammation of the sigmoid colon and distal descending colon,  compatible with acute colitis. This may be related to stercoral colitis  although differential also includes infectious colitis and acute  diverticulitis given the presence of small diverticula.     4.  Multilevel lumbar spine degenerative change and grade 1  anterolisthesis of L4 on L5 which is likely degenerative.     5.  Distended gallbladder without evidence of acute cholecystitis.  This report was finalized on 09/04/2023 18:58 by Dr. Bakari Ramos MD.    CT Cervical Spine Without Contrast [[id number]] Collected: 09/04/23 1844     Updated: 09/04/23 1852    Narrative:      EXAM/TECHNIQUE: CT cervical spine without contrast     INDICATION: Neck trauma (Age >= 65y)     COMPARISON: None     DLP: 367 mGy cm. Automated exposure control was also utilized to  decrease patient radiation dose.     FINDINGS:     Craniocervical relationships are maintained. No evidence of acute  odontoid process fracture. Corticated ossific fragment along the tip of  the odontoid may be related to old injury. Trace anterolisthesis of C4  on C5. Cervical spine alignment is otherwise maintained. Vertebral body  heights are maintained. No acute fracture. Facet joints are anatomically  aligned. Moderate multilevel cervical spine degenerative change with  multilevel neural foraminal stenosis, most pronounced at C5-C6.  Paravertebral soft tissues are unremarkable. Carotid artery  atherosclerotic calcifications are noted.       Impression:         1.  No acute osseous findings.  2.  Moderate multilevel cervical spine degenerative change, greatest at  C5-C6.  3.  Trace anterolisthesis of C4 on C5, likely degenerative in nature.  This report was finalized on 09/04/2023 18:49 by Dr. Bakari Ramos MD.    CT Head Without Contrast [[id number]] Collected: 09/04/23 1844     Updated: 09/04/23 1847    Narrative:      EXAM/TECHNIQUE: CT head without contrast     INDICATION: Head trauma,  moderate-severe     COMPARISON: 02/08/2022     DLP: 697 mGy cm. Automated exposure control was also utilized to  decrease patient radiation dose.     FINDINGS:     No evidence of intracranial hemorrhage. Gray-white differentiation is  maintained. Chronic microvascular ischemic white matter change and  global cerebral volume loss. Mild ex vacuo ventricular dilatation. No  midline shift or mass effect. Basilar cisterns are patent. No acute  orbital finding. Mastoid air cells are clear. Chronic RIGHT maxillary  sinus mucosal thickening and mucosal retention cyst. Motion artifact  limits evaluation of the skull base. No acute osseous finding.       Impression:         1.  No acute intracranial findings.  2.  Global cerebral volume loss and presumed chronic microvascular  ischemic white matter change.  This report was finalized on 09/04/2023 18:44 by Dr. Bakari Ramos MD.    XR Chest 1 View [[id number]] Collected: 09/04/23 1705     Updated: 09/04/23 1710    Narrative:      EXAM/TECHNIQUE: XR CHEST 1 VW-     INDICATION: Altered mental status     COMPARISON: 03/14/2023     FINDINGS:     Cardiac silhouette is normal in size. No pleural effusion, pneumothorax,  or focal consolidation. No acute osseous finding.       Impression:         No acute findings.  This report was finalized on 09/04/2023 17:07 by Dr. Bakari Ramos MD.            I have reviewed the patient's current medications.     Assessment/Plan   Assessment  Active Hospital Problems    Diagnosis     **UTI (urinary tract infection)     TOMÁS (acute kidney injury)     Chronic pain syndrome     Chronic prescription opiate use     Hypothyroidism (acquired)     Essential hypertension     Non-traumatic rhabdomyolysis     Spinal stenosis, lumbar region, without neurogenic claudication        Treatment Plan  UTI-  Blood culture x2 pending  Urine culture pending  Continue IV Rocephin  Leukocytosis improving  Temperature 101.5 last evening, afebrile since.  CBC in  a.m.    Colitis-  Acute infectious versus stercoral.  Initiate IV Flagyl  Initiate bowel regimen  Leukocytosis improving    TOMÁS-  IV normal saline at 75 mL/h  Creatinine improving from 1.66-1.1.  BMP in AM.    Anemia-  History of.  Hemoglobin in June 10.6.  Hemoglobin on arrival 9.6 and has trended down to 8.8, I suspect this is partially dilutional.  Recheck CBC in AM.  B12 level in AM.    Hypokalemia-  Potassium on arrival 3.1, replacement thereafter.  Repeat potassium 2.9, 40 mill equivalents oral potassium ordered.  Phosphorus and magnesium level checked and normal.  BMP in AM.    Nontraumatic rhabdomyolysis-  CK trend 676, 528.  Continue IV fluids.  Continue rest.  Recheck CK in AM.    Lumbar spinal stenosis with chronic pain-  Efforts at pain control.  Tylenol, Dilaudid, oxycodone.  PT/OT    Essential hypertension-  Continue Coreg 12.5 twice daily    Hypothyroidism-  Continue Synthroid    Lovenox for DVT prophylaxis    Case management/ consulted and following for events prior to admission as well as discharge disposition coordination.    Medical Decision Making  Number and Complexity of problems: 1 acute problem of moderate complexity in UTI.  1 acute problem of moderate complexity in colitis.  1 acute problem of moderate complexity in acute kidney injury.  1 acute problem of moderate complexity in hypokalemia.  1 acute problem of moderate complexity in nontraumatic rhabdomyolysis.  3 chronic problems of moderate complexity in lumbar spinal stenosis with chronic pain, essential hypertension, hypothyroidism  Differential Diagnosis: None    Conditions and Status        Status stable     MDM Data  External documents reviewed: None  Cardiac tracing (EKG, telemetry) interpretation: EKG reviewed per cardiologist or potation  Radiology interpretation: X-ray left shoulder, CT C-spine, CT head, CT T-spine, CT L-spine, CT abdomen pelvis, chest x-ray reviewed per radiologist interpretation  Labs reviewed:  CK, proBNP, CMP, CBC with differential, urinalysis, urine culture, blood culture  Any tests that were considered but not ordered: None     Decision rules/scores evaluated (example [id number], [place], etc): None     Discussed with: Patient and Dr. Chavarria     Care Planning  Shared decision making: Discussed with above, patient agreeable to her plan of care  Code status and discussions: Full    Disposition  Social Determinants of Health that impact treatment or disposition: Patient's current living situation, down on the floor at her home for an extended amount of time without the assistance of family to help her up or seek care.  Discharge disposition undeterminable at this time, likely SNF.    Electronically signed by MORGAN Shields, 09/05/23, 13:53 CDT.

## 2023-09-05 NOTE — PLAN OF CARE
"Goal Outcome Evaluation:  Plan of Care Reviewed With: patient        Progress: no change (eval)  Outcome Evaluation: ST clinical bedside swallow evaluation completed secondary to failed swallow screen. Pt admitted for acute UTI and fall. Hx of GERD, uterine CA, and HTN. Pt noted to have difficulty maintaining alertness for first half of evaluation. Pt is oriented to name and place. Disoriented to month and year. When repositioning pt upright in bed, pt stated, \"don't put me in the sand.\" ST repeated pt statement to ensure accuracy and pt stated, \"yup.\" Pt did become more alert as PO trials presented with no difficulty following commands. Feel initial confusion likely d/t UTI dx. Oral cavity noted to be dry secondary to mouth breathing noted upon ST arrival. Pt reports no previous difficulty with PO and stated only trouble is poor appetite at times. Full range of consistencies presented except mech soft. Timely swallow with all trials. Adequate mastication of solids. Bleching noted after thin and nectar via straw. No overt s/s of aspiration noted.     Pt okay to begin regular solids diet and thin liquids. Meds whole with thin liquids. General aspiration precautions with PO. ST cannot fully r/o silent aspiration. RN to continue to monitor for any increased lung congestion. ST to sign off at this time. MD to reconsult if change or new concern.         "

## 2023-09-05 NOTE — PROGRESS NOTES
"Pharmacy Dosing Service  Anticoagulant  Enoxaparin    Assessment/Action/Plan:  Patient is currently receiving Enoxaparin 30 mg SQ every 24 hours for indication of VTE prophylaxis. Labs/dose reviewed. Renal function has improved since admission. Will increase enoxaparin to 40 mg SQ every 24 hours. Pharmacy will continue to monitor renal function and adjust dose accordingly.     Subjective:  Jennifer Gomes is a 82 y.o. female  Objective:  [Ht: 157.5 cm (62.01\"); Wt: 75.5 kg (166 lb 7.2 oz); BMI: Body mass index is 30.44 kg/m².]  Estimated Creatinine Clearance: 38.8 mL/min (A) (by C-G formula based on SCr of 1.1 mg/dL (H)).   Lab Results   Component Value Date    INR 1.27 (H) 02/08/2022    INR 1.0 05/02/2017    PROTIME 15.4 (H) 02/08/2022    PROTIME 12.3 05/02/2017      Lab Results   Component Value Date    HGB 8.8 (L) 09/05/2023    HGB 9.6 (L) 09/04/2023    HGB 10.9 (L) 06/05/2023      Lab Results   Component Value Date     09/05/2023     09/04/2023     06/05/2023         Khanh Helm, PharmD  09/05/23 15:16 CDT     "

## 2023-09-05 NOTE — PLAN OF CARE
Goal Outcome Evaluation:  Plan of Care Reviewed With: patient      Patient c/o pain in left arm and left leg, PRN pain med given. No c/o nausea. Pt answers orientation questions appropriately at times and is confused at times. At times patient struggles to follow commands. Neuro's intact. Pt on 1 L/min per NC while sleeping. Continuous pulse ox in use. Patient on Lovenox. Patient turned Q2. External catheter in place, voiding. Patient strict NPO due to failing RN dysphagia screen, no PO meds given. Patient had temp of 101.5, on call MD notified, see orders. VSS.

## 2023-09-05 NOTE — ED NOTES
----- Message from Anna Mckee sent at 3/8/2019 10:36 AM CST -----  Contact: [phone] pt  Type:RX Refill Request  Who Called: pt   Best Call Back Number: [phone]     Preferred Pharmacy: [facility] [address] [place], [place] [address] AT [facility]  Ordering Provider:Kory   RX Name and Strength:dextroamphetamine-amphetamine (ADDERALL XR) 15 MG 24 hr capsule  How is the patient currently taking it? (ex. 1XDay)  30dayRX  Local or Mail Order:na  Would the patient rather a call back or a response via [facility]?    Additional Information:    Pt changed due to urinary incontinence.

## 2023-09-05 NOTE — H&P
"    [facility] Medicine Services  HISTORY AND PHYSICAL    Date of Admission: 2023  Primary Care Physician: Olivia Mora APRN    Subjective   Primary Historian: Patient    Chief Complaint: Back pain    History of Present Illness  82 year old female with PMH of lumbar stenosis, chronic pain, RLS, HTN, RAFAL that is brought to the ER after falling and remaining on the ground for \"a couple of days\". EMS was contacted for lift assist and they found her covered in stools, decided to not leave her at home and bring her to the ER instead. Patient appears groggy and her main concern is with pain. Blood work shows elevated creatinine, CPK, inflammatory changes in UA and elevated BNP. She appears to weak to stand and has failed the swallow evaluation.     Review of Systems   Otherwise complete ROS reviewed and negative except as mentioned in the HPI.    Past Medical History:   Past Medical History:   Diagnosis Date    Anxiety     Arthritis     Bronchitis     Cancer     uterine    Chronic pain     Depression     Disease of thyroid gland     Fibromyalgia     GERD (gastroesophageal reflux disease)     Headache     History of transfusion     AS     Hyperlipidemia     Hypertension     Incontinence     Insomnia     Leg pain     Lumbar stenosis     Migraines     Peptic ulcer     Restless legs     Sleep apnea     NO C-PAP    UTI (urinary tract infection)     Vaginal bleeding      Past Surgical History:  Past Surgical History:   Procedure Laterality Date    BLADDER REPAIR      MESH HAD TO BE REMOVED IN     BREAST BIOPSY Right 2017    benign    BREAST CYST EXCISION Left     CARDIAC CATHETERIZATION      CARPAL TUNNEL RELEASE      CATARACT EXTRACTION W/ INTRAOCULAR LENS  IMPLANT, BILATERAL      COLONOSCOPY      COLONOSCOPY N/A 10/01/2021    Procedure: COLONOSCOPY WITH ANESTHESIA;  Surgeon: Tom Velasco DO;  Location: [facility] ENDOSCOPY;  Service: Gastroenterology;  " Laterality: N/A;  pre: change in bowel habits  post: diverticulosis. hemorrhoids.   Olivia Mora APRN        CYSTECTOMY      D & C HYSTEROSCOPY N/A 11/06/2017    Procedure: DILATATION AND CURETTAGE HYSTEROSCOPY;  Surgeon: Shasta Madrigal MD;  Location: [facility] OR;  Service:     DILATION AND CURETTAGE, DIAGNOSTIC / THERAPEUTIC  2008    ENDOSCOPY  09/23/2010    Short segment of Arriola's,Moderate chroninc esophagogastritis and negative H.pylori    ENDOSCOPY N/A 09/25/2017    Procedure: ESOPHAGOGASTRODUODENOSCOPY WITH ANESTHESIA;  Surgeon: Tom Velasco DO;  Location: [facility] ENDOSCOPY;  Service:     EYE SURGERY      RETINA    HEMORRHOIDECTOMY SIGMOIDOSCOPY N/A 3/21/2023    Procedure: HEMORRHOIDECTOMY WITH EXAM UNDER ANESTHESIA;  Surgeon: Holly Chavez MD;  Location: [facility] OR;  Service: General;  Laterality: N/A;    HYSTERECTOMY  12/20/2017    ORIF TIBIA/FIBULA FRACTURES Left 2000    TRANSVAGINAL TAPING SUSPENSION N/A 11/06/2017    Procedure: VAGINAL MESH REVISION;  Surgeon: Shasta Madrigal MD;  Location: [facility] OR;  Service:     VAGINAL MESH REVISION  2013     Social History:  reports that she has never smoked. She has never used smokeless tobacco. She reports that she does not currently use alcohol. She reports that she does not use drugs.    Family History: family history includes Cancer in her paternal grandmother; Diabetes in her mother and sister; Lung cancer in her paternal grandfather; Lymphoma in her brother; Multiple myeloma in her mother; Ovarian cancer in her paternal aunt; Prostate cancer in her brother; Stroke in her father.       Allergies:  Allergies   Allergen Reactions    Ropinirole Hcl Shortness Of Breath    Codeine Itching and Mental Status Change    Definity [Perflutren Lipid Microsphere] Other (See Comments)     Severe back pain    Ambien [Zolpidem] Other (See Comments)     HYPER     Eszopiclone Other (See Comments)     MAKES PT HYPER     Pregabalin Dizziness    Tizanidine Other  (See Comments)     Terrible nightmares       Medications:  Prior to Admission medications    Medication Sig Start Date End Date Taking? Authorizing Provider   acetaminophen (Tylenol) 325 MG tablet Take 3 tablets by mouth Every 8 (Eight) Hours. Take every 8 hours for 3 days then take prn as needed. 3/21/23 3/20/24  Holly Chavez MD   ALPRAZolam (XANAX) 1 MG tablet Take 1 tablet by mouth 3 (Three) Times a Day As Needed for Anxiety or Sleep.  Patient not taking: Reported on 8/24/2023 5/27/23   Shasta Madrigal MD   aspirin 325 MG tablet Take 1 tablet by mouth Daily.    Tamiko Gaviria MD   atorvastatin (LIPITOR) 40 MG tablet Take 1 tablet by mouth Daily. 1/16/23   Tamiko Gaviria MD   bumetanide (BUMEX) 1 MG tablet Take 1 tablet by mouth As Needed.    Tamiko Gaviria MD   butalbital-acetaminophen-caffeine (FIORICET, ESGIC) -40 MG per tablet Take 1 tablet by mouth 2 (Two) Times a Day As Needed for Headache.    Tamiko Gaviria MD   carvedilol (COREG) 12.5 MG tablet Take 1 tablet by mouth 2 (Two) Times a Day With Meals. 8/28/19   Tamiko Gaviria MD   cyclobenzaprine (FLEXERIL) 10 MG tablet 2 (Two) Times a Day As Needed. 11/17/22   Tamiko Gaviria MD   diclofenac (VOLTAREN) 75 MG EC tablet Take 1 tablet by mouth 2 (Two) Times a Day. 1/9/23   Tamiko Gaviria MD   diphenhydrAMINE (BENADRYL) 25 mg capsule Take 1 capsule by mouth Every 6 (Six) Hours As Needed for Itching.  Patient not taking: Reported on 8/24/2023    Tamiko Gaviria MD   donepezil (ARICEPT) 10 MG tablet Take 1 tablet by mouth Every Night. 10/18/22   Tamiko Gaviria MD   ergocalciferol (ERGOCALCIFEROL) 74078 units capsule Take 1 capsule by mouth 1 (One) Time Per Week. Saturday 4/17/19   Provider, Historical, MD   GNP Hydrocortisone Max St 1 % ointment APPLY TOPICALLY 3 TIMES DAILY. 2/2/23   Tamiko Gaviria MD   ibuprofen (Motrin IB) 200 MG tablet Take 3 tablets by mouth Every 8 (Eight)  Hours. Take every 8 hours for three days then take as needed. 3/21/23 3/20/24  Holly Chavez MD   ipratropium (ATROVENT) 0.06 % nasal spray 2 sprays into the nostril(s) as directed by provider 4 (Four) Times a Day As Needed for Rhinitis. For drainage  Patient not taking: Reported on 8/24/2023 1/19/22   Patricio Angeles Jr., MD   ipratropium (ATROVENT) 0.06 % nasal spray 2 sprays. 11/23/22   Tamiko Gaviria MD   LACTASE ENZYME PO Take 3 tablets by mouth As Needed (takes before dairy products).    Tamiko Gaviria MD   lansoprazole (PREVACID) 30 MG capsule Take 1 capsule by mouth Every Morning. 3/19/20   Tamiko Gaviria MD   levothyroxine (SYNTHROID, LEVOTHROID) 50 MCG tablet Take 1 tablet by mouth Every Morning.    Tamiko Gaviria MD   Lidocaine, Anorectal, (LMX 5) 5 % cream cream  5/18/23   Tamiko Gaviria MD   magnesium hydroxide (Milk of Magnesia) 400 MG/5ML suspension Take 30 mL by mouth Daily As Needed for Constipation.  Patient not taking: Reported on 8/24/2023 3/21/23 3/20/24  Holly Chavez MD   naloxone (Narcan) 4 MG/0.1ML nasal spray 1 spray into the nostril(s) as directed by provider As Needed (narcotic overdose).  Patient not taking: Reported on 8/24/2023 3/21/23   Holly Chavez MD   ondansetron (Zofran) 4 MG tablet Take 1 tablet by mouth Every 8 (Eight) Hours As Needed for Nausea or Vomiting. 3/21/23 3/20/24  Holly Chavez MD   oxyCODONE (ROXICODONE) 15 MG immediate release tablet Every 6 (Six) Hours As Needed. 11/21/22   Tamiko Gaviria MD   oxyCODONE (Roxicodone) 5 MG immediate release tablet Take 1 tablet by mouth Every 8 (Eight) Hours As Needed for Severe Pain. 3/21/23 3/20/24  Holly Chavez MD   polyethylene glycol (MiraLax) 17 g packet Take 17 g by mouth Daily. 1/16/23   Daphne Hussein APRN   sertraline (ZOLOFT) 25 MG tablet Take 1 tablet by mouth Daily. 2/28/23 2/28/24  Shasta Madrigal MD   solifenacin (VESICARE) 10 MG  "tablet Take 1 tablet by mouth Daily. 2/27/23 2/28/24  Provider, MD Tamiko   SUMAtriptan (IMITREX) 50 MG tablet Take 1 tablet by mouth 2 (Two) Times a Day As Needed for Migraine. 7/29/19   Shasta Madrigal MD   venlafaxine (EFFEXOR) 25 MG tablet Take 1 tablet by mouth Daily. 2/28/23   Shasta Madrigal MD     I have utilized all available immediate resources to obtain, update, or review the patient's current medications (including all prescriptions, over-the-counter products, herbals, cannabis/cannabidiol products, and vitamin/mineral/dietary (nutritional) supplements).    Objective     Vital Signs: /83 (BP Location: Right arm, Patient Position: Lying)   Pulse 97   Temp 97.5 °F (36.4 °C) (Oral)   Resp 17   Ht 157.5 cm (62\")   Wt 83.9 kg (185 lb)   LMP  (LMP Unknown)   SpO2 95%   BMI 33.84 kg/m²   Physical Exam  Vitals reviewed.   Constitutional:       General: She is not in acute distress.     Appearance: She is well-developed. She is not toxic-appearing.      Comments: Debilitated and disoriented elderly female. She appears groggy and unconcerned.    HENT:      Head: Normocephalic and atraumatic.      Right Ear: External ear normal.      Left Ear: External ear normal.      Mouth/Throat:      Mouth: Mucous membranes are dry.      Pharynx: Oropharynx is clear.   Eyes:      General:         Right eye: No discharge.         Left eye: No discharge.      Extraocular Movements: Extraocular movements intact.      Conjunctiva/sclera: Conjunctivae normal.      Pupils: Pupils are equal, round, and reactive to light.   Neck:      Vascular: No JVD.   Cardiovascular:      Rate and Rhythm: Normal rate and regular rhythm.      Pulses: Normal pulses.      Heart sounds: Normal heart sounds. No murmur heard.    No friction rub. No gallop.   Pulmonary:      Effort: Pulmonary effort is normal. No respiratory distress.      Breath sounds: No stridor. No wheezing, rhonchi or rales.   Chest:      Chest wall: No tenderness. "   Abdominal:      General: Bowel sounds are normal. There is no distension.      Palpations: Abdomen is soft.      Tenderness: There is no abdominal tenderness. There is no guarding or rebound.      Hernia: No hernia is present.   Musculoskeletal:         General: No swelling, tenderness or deformity. Normal range of motion.      Cervical back: Normal range of motion and neck supple. No rigidity or tenderness. No muscular tenderness.      Right lower leg: No edema.      Left lower leg: No edema.   Skin:     General: Skin is warm and dry.      Findings: No erythema or rash.   Neurological:      Mental Status: She is alert.      Cranial Nerves: No cranial nerve deficit.      Sensory: No sensory deficit.      Motor: No weakness or abnormal muscle tone.      Deep Tendon Reflexes: Reflexes normal.   Psychiatric:         Mood and Affect: Mood normal.         Behavior: Behavior normal.      Results Reviewed:  Lab Results (last 24 hours)       Procedure Component Value Units Date/Time    High Sensitivity Troponin T 2Hr [[id number]]  (Abnormal) Collected: 09/04/23 1857    Specimen: Blood from Arm, Left Updated: 09/04/23 1929     HS Troponin T 21 ng/L      Troponin T Delta -2 ng/L     Narrative:      High Sensitive Troponin T Reference Range:  <10.0 ng/L- Negative Female for AMI  <15.0 ng/L- Negative Male for AMI  >=10 - Abnormal Female indicating possible myocardial injury.  >=15 - Abnormal Male indicating possible myocardial injury.   Clinicians would have to utilize clinical acumen, EKG, Troponin, and serial changes to determine if it is an Acute Myocardial Infarction or myocardial injury due to an underlying chronic condition.         Urinalysis With Microscopic If Indicated (No Culture) - Straight Cath [[id number]]  (Abnormal) Collected: 09/04/23 1725    Specimen: Urine from Straight Cath Updated: 09/04/23 1810     Color, UA Dark Yellow     Appearance, UA Turbid     pH, UA 5.5     Specific Gravity, UA 1.019     Glucose, UA  Negative     Ketones, UA Trace     Bilirubin, UA Small (1+)     Blood, UA Large (3+)     Protein,  mg/dL (2+)     Leuk Esterase, UA Large (3+)     Nitrite, UA Positive     Urobilinogen, UA 1.0 E.U./dL    Urinalysis, Microscopic Only - Straight Cath [[id number]]  (Abnormal) Collected: 09/04/23 1725    Specimen: Urine from Straight Cath Updated: 09/04/23 1810     RBC, UA 13-20 /HPF      WBC, UA Too Numerous to Count /HPF      Bacteria, UA 4+ /HPF      Squamous Epithelial Cells, UA 3-6 /HPF      Hyaline Casts, UA None Seen /LPF      Methodology Manual Light Microscopy    Manual Differential [[id number]]  (Abnormal) Collected: 09/04/23 1657    Specimen: Blood from Arm, Left Updated: 09/04/23 1808     Neutrophil % 52.0 %      Lymphocyte % 7.0 %      Monocyte % 4.0 %      Bands %  34.0 %      Metamyelocyte % 3.0 %      Neutrophils Absolute 17.73 10*3/mm3      Lymphocytes Absolute 1.44 10*3/mm3      Monocytes Absolute 0.82 10*3/mm3      Anisocytosis Slight/1+     Crenated RBC's Mod/2+     Poikilocytes Mod/2+     Polychromasia Slight/1+     WBC Morphology Normal     Platelet Morphology Normal    CBC & Differential [[id number]]  (Abnormal) Collected: 09/04/23 1657    Specimen: Blood from Arm, Left Updated: 09/04/23 1808    Narrative:      The following orders were created for panel order CBC & Differential.  Procedure                               Abnormality         Status                     ---------                               -----------         ------                     CBC Auto Differential[[id number]]        Abnormal            Final result                 Please view results for these tests on the individual orders.    CBC Auto Differential [[id number]]  (Abnormal) Collected: 09/04/23 1657    Specimen: Blood from Arm, Left Updated: 09/04/23 1808     WBC 20.62 10*3/mm3      RBC 3.11 10*6/mm3      Hemoglobin 9.6 g/dL      Hematocrit 29.5 %      MCV 94.9 fL      MCH 30.9 pg      MCHC 32.5 g/dL      RDW 15.0 %       RDW-SD 51.4 fl      MPV 12.9 fL      Platelets 182 10*3/mm3     Narrative:      The previously reported component NRBC is no longer being reported. Previous result was 0.0 /100 WBC (Reference Range: 0.0-0.2 /100 WBC) on 9/4/2023 at 1749 CDT.    Comprehensive Metabolic Panel [[id number]]  (Abnormal) Collected: 09/04/23 1657    Specimen: Blood from Arm, Left Updated: 09/04/23 1803     Glucose 114 mg/dL      BUN 51 mg/dL      Creatinine 1.66 mg/dL      Sodium 138 mmol/L      Potassium 3.1 mmol/L      Comment: Slight hemolysis detected by analyzer. Results may be affected.        Chloride 105 mmol/L      CO2 17.0 mmol/L      Calcium 9.7 mg/dL      Total Protein 6.1 g/dL      Albumin 3.1 g/dL      ALT (SGPT) 28 U/L      AST (SGOT) 43 U/L      Comment: Slight hemolysis detected by analyzer. Results may be affected.        Alkaline Phosphatase 91 U/L      Total Bilirubin 0.4 mg/dL      Globulin 3.0 gm/dL      A/G Ratio 1.0 g/dL      BUN/Creatinine Ratio 30.7     Anion Gap 16.0 mmol/L      eGFR 31.1 mL/min/1.73     Narrative:      GFR Normal >60  Chronic Kidney Disease <60  Kidney Failure <15    The GFR formula is only valid for adults with stable renal function between ages 18 and 70.    Magnesium [[id number]]  (Normal) Collected: 09/04/23 1657    Specimen: Blood from Arm, Left Updated: 09/04/23 1803     Magnesium 2.4 mg/dL     Lactic Acid, Plasma [[id number]]  (Abnormal) Collected: 09/04/23 1657    Specimen: Blood from Arm, Left Updated: 09/04/23 1801     Lactate 2.1 mmol/L     CK [[id number]]  (Abnormal) Collected: 09/04/23 1657    Specimen: Blood from Arm, Left Updated: 09/04/23 1800     Creatine Kinase 676 U/L     BNP [[id number]]  (Abnormal) Collected: 09/04/23 1657    Specimen: Blood from Arm, Left Updated: 09/04/23 1758     proBNP 2,431.0 pg/mL     Narrative:      Among patients with dyspnea, NT-proBNP is highly sensitive for the detection of acute congestive heart failure. In addition NT-proBNP of <300 pg/ml effectively  rules out acute congestive heart failure with 99% negative predictive value.      High Sensitivity Troponin T [[id number]]  (Abnormal) Collected: 09/04/23 1657    Specimen: Blood from Arm, Left Updated: 09/04/23 1757     HS Troponin T 23 ng/L     Narrative:      High Sensitive Troponin T Reference Range:  <10.0 ng/L- Negative Female for AMI  <15.0 ng/L- Negative Male for AMI  >=10 - Abnormal Female indicating possible myocardial injury.  >=15 - Abnormal Male indicating possible myocardial injury.   Clinicians would have to utilize clinical acumen, EKG, Troponin, and serial changes to determine if it is an Acute Myocardial Infarction or myocardial injury due to an underlying chronic condition.         Blood Culture - Blood, Hand, Left [[id number]] Collected: 09/04/23 1743    Specimen: Blood from Hand, Left Updated: 09/04/23 1757    Lipase [[id number]]  (Abnormal) Collected: 09/04/23 1657    Specimen: Blood from Arm, Left Updated: 09/04/23 1755     Lipase 7 U/L     Blood Culture - Blood, Arm, Left [[id number]] Collected: 09/04/23 1704    Specimen: Blood from Arm, Left Updated: 09/04/23 1744          Imaging Results (Last 24 Hours)       Procedure Component Value Units Date/Time    XR Shoulder 2+ View Left [[id number]] Collected: 09/04/23 1952     Updated: 09/04/23 1957    Narrative:      EXAM/TECHNIQUE: XR SHOULDER 2+ VW LEFT-     INDICATION: left shoulder pain     COMPARISON: None     FINDINGS:     Glenohumeral and acromioclavicular joints are maintained. No acute  fracture or dislocation. Mild AC joint osteophyte development. No  suspicious bone lesion. Included portion of the LEFT chest appears  unremarkable. No acute soft tissue finding.       Impression:         No acute osseous findings.  This report was finalized on 09/04/2023 19:54 by Dr. Bakari Ramos MD.    CT Thoracic Spine Without Contrast [[id number]] Collected: 09/04/23 1859     Updated: 09/04/23 1905    Narrative:      EXAM/TECHNIQUE: CT thoracic spine  without contrast     INDICATION: Spine fracture, thoracic, traumatic     COMPARISON: None     DLP: 963 mGy cm. Automated exposure control was also utilized to  decrease patient radiation dose.     FINDINGS:     Mild thoracic spine levocurvature, potentially related to patient  positioning. Thoracic kyphosis is maintained. No subluxations. Vertebral  body heights are maintained. No acute fracture. Mild multilevel thoracic  spine degenerative change. Included portion of the posterior lungs are  clear. No pneumothorax. Paravertebral soft tissues are unremarkable.  Esophagus is mildly patulous.       Impression:         1.  No acute fracture or subluxation.  2.  Mild multilevel cervical spine degenerative change.  This report was finalized on 09/04/2023 19:01 by Dr. Bakari Ramos MD.    CT Lumbar Spine Without Contrast [[id number]] Collected: 09/04/23 1852     Updated: 09/04/23 1901    Narrative:      EXAM/TECHNIQUE:  1. CT abdomen pelvis without contrast  2. CT lumbar spine without contrast     INDICATION: Fall, abdominal pain, back pain     COMPARISON: 02/08/2022     DLP: 252 mGy cm. Automated exposure control was also utilized to  decrease patient radiation dose.     FINDINGS:     CT abdomen pelvis without contrast:     Mild atelectasis in the included lung bases. The unenhanced liver,  gallbladder, adrenal glands, and pancreas are unremarkable. Gallbladder  is distended without evidence of wall thickening or gallstones. No large  renal lesion. No urolithiasis or hydronephrosis. No focal urinary  bladder abnormality.     Large volume stool in the rectum and distal colon. Long segment wall  thickening of the sigmoid colon and distal descending colon with  surrounding fat stranding. A few scattered colonic diverticula are also  present within this region. The ascending and transverse colon appear  unremarkable. No secondary signs of appendicitis. No small bowel  distention or evidence of active small bowel  inflammation.     No ascites or free pelvic fluid. No pelvic mass or pelvic collection.  Prior hysterectomy.     Normal caliber abdominal aorta with atherosclerotic calcification. No  enlarged abdominal or pelvic lymph nodes. No acute pelvic or hip  fracture. Sacrum appears intact. SI joints and pubic symphysis are  intact.     CT lumbar spine without contrast:     5 lumbar type vertebral bodies. Grade 1 anterolisthesis of L4 on L5,  likely degenerative in nature. Lumbar vertebral body heights are  maintained. No acute fracture. Moderate multilevel lumbar spine  degenerative change with multilevel central canal or neural foraminal  stenosis.       Impression:         1.  No acute traumatic finding in the abdomen or pelvis.     2.  No acute lumbar spine fracture.     3.  Large volume stool in the rectum and distal colon. Wall thickening  and inflammation of the sigmoid colon and distal descending colon,  compatible with acute colitis. This may be related to stercoral colitis  although differential also includes infectious colitis and acute  diverticulitis given the presence of small diverticula.     4.  Multilevel lumbar spine degenerative change and grade 1  anterolisthesis of L4 on L5 which is likely degenerative.     5.  Distended gallbladder without evidence of acute cholecystitis.  This report was finalized on 09/04/2023 18:58 by Dr. Bakari Ramos MD.    CT Abdomen Pelvis Without Contrast [[id number]] Collected: 09/04/23 1852     Updated: 09/04/23 1901    Narrative:      EXAM/TECHNIQUE:  1. CT abdomen pelvis without contrast  2. CT lumbar spine without contrast     INDICATION: Fall, abdominal pain, back pain     COMPARISON: 02/08/2022     DLP: 252 mGy cm. Automated exposure control was also utilized to  decrease patient radiation dose.     FINDINGS:     CT abdomen pelvis without contrast:     Mild atelectasis in the included lung bases. The unenhanced liver,  gallbladder, adrenal glands, and pancreas are  unremarkable. Gallbladder  is distended without evidence of wall thickening or gallstones. No large  renal lesion. No urolithiasis or hydronephrosis. No focal urinary  bladder abnormality.     Large volume stool in the rectum and distal colon. Long segment wall  thickening of the sigmoid colon and distal descending colon with  surrounding fat stranding. A few scattered colonic diverticula are also  present within this region. The ascending and transverse colon appear  unremarkable. No secondary signs of appendicitis. No small bowel  distention or evidence of active small bowel inflammation.     No ascites or free pelvic fluid. No pelvic mass or pelvic collection.  Prior hysterectomy.     Normal caliber abdominal aorta with atherosclerotic calcification. No  enlarged abdominal or pelvic lymph nodes. No acute pelvic or hip  fracture. Sacrum appears intact. SI joints and pubic symphysis are  intact.     CT lumbar spine without contrast:     5 lumbar type vertebral bodies. Grade 1 anterolisthesis of L4 on L5,  likely degenerative in nature. Lumbar vertebral body heights are  maintained. No acute fracture. Moderate multilevel lumbar spine  degenerative change with multilevel central canal or neural foraminal  stenosis.       Impression:         1.  No acute traumatic finding in the abdomen or pelvis.     2.  No acute lumbar spine fracture.     3.  Large volume stool in the rectum and distal colon. Wall thickening  and inflammation of the sigmoid colon and distal descending colon,  compatible with acute colitis. This may be related to stercoral colitis  although differential also includes infectious colitis and acute  diverticulitis given the presence of small diverticula.     4.  Multilevel lumbar spine degenerative change and grade 1  anterolisthesis of L4 on L5 which is likely degenerative.     5.  Distended gallbladder without evidence of acute cholecystitis.  This report was finalized on 09/04/2023 18:58 by Dr. Villegas  MD Richard.    CT Cervical Spine Without Contrast [[id number]] Collected: 09/04/23 1844     Updated: 09/04/23 1852    Narrative:      EXAM/TECHNIQUE: CT cervical spine without contrast     INDICATION: Neck trauma (Age >= 65y)     COMPARISON: None     DLP: 367 mGy cm. Automated exposure control was also utilized to  decrease patient radiation dose.     FINDINGS:     Craniocervical relationships are maintained. No evidence of acute  odontoid process fracture. Corticated ossific fragment along the tip of  the odontoid may be related to old injury. Trace anterolisthesis of C4  on C5. Cervical spine alignment is otherwise maintained. Vertebral body  heights are maintained. No acute fracture. Facet joints are anatomically  aligned. Moderate multilevel cervical spine degenerative change with  multilevel neural foraminal stenosis, most pronounced at C5-C6.  Paravertebral soft tissues are unremarkable. Carotid artery  atherosclerotic calcifications are noted.       Impression:         1.  No acute osseous findings.  2.  Moderate multilevel cervical spine degenerative change, greatest at  C5-C6.  3.  Trace anterolisthesis of C4 on C5, likely degenerative in nature.  This report was finalized on 09/04/2023 18:49 by Dr. Bakari Ramos MD.    CT Head Without Contrast [[id number]] Collected: 09/04/23 1844     Updated: 09/04/23 1847    Narrative:      EXAM/TECHNIQUE: CT head without contrast     INDICATION: Head trauma, moderate-severe     COMPARISON: 02/08/2022     DLP: 697 mGy cm. Automated exposure control was also utilized to  decrease patient radiation dose.     FINDINGS:     No evidence of intracranial hemorrhage. Gray-white differentiation is  maintained. Chronic microvascular ischemic white matter change and  global cerebral volume loss. Mild ex vacuo ventricular dilatation. No  midline shift or mass effect. Basilar cisterns are patent. No acute  orbital finding. Mastoid air cells are clear. Chronic RIGHT maxillary  sinus  mucosal thickening and mucosal retention cyst. Motion artifact  limits evaluation of the skull base. No acute osseous finding.       Impression:         1.  No acute intracranial findings.  2.  Global cerebral volume loss and presumed chronic microvascular  ischemic white matter change.  This report was finalized on 09/04/2023 18:44 by Dr. Bakari Ramos MD.    XR Chest 1 View [[id number]] Collected: 09/04/23 1705     Updated: 09/04/23 1710    Narrative:      EXAM/TECHNIQUE: XR CHEST 1 VW-     INDICATION: Altered mental status     COMPARISON: 03/14/2023     FINDINGS:     Cardiac silhouette is normal in size. No pleural effusion, pneumothorax,  or focal consolidation. No acute osseous finding.       Impression:         No acute findings.  This report was finalized on 09/04/2023 17:07 by Dr. Bakari Ramos MD.          I have personally reviewed and interpreted the radiology studies and ECG obtained at time of admission.     Assessment / Plan   Assessment:   Active Hospital Problems    Diagnosis     **UTI (urinary tract infection)     TOMÁS (acute kidney injury)     Chronic pain syndrome     Chronic prescription opiate use     Hypothyroidism (acquired)     Essential hypertension     Non-traumatic rhabdomyolysis     Spinal stenosis, lumbar region, without neurogenic claudication      Treatment Plan  The patient will be admitted to my service here at [facility].   Admit to medical floor  Vitals every 4 hours  NPO due to failed swallow evaluation  IVF NS 75 cc/hour  Pain control > ?opiate overuse    Rocephin for UTI  I&O, daily BMP    Echocardiogram evaluate LVEF    Social work consult for APS    DVT prophylaxis > Lovenox 30 mg SQ daily      Medical Decision Making  Number and Complexity of problems: 4 complex medical problems  Differential Diagnosis: none    Conditions and Status        Condition is unchanged.     [facility] Data  External documents reviewed: Etaphase  Cardiac tracing (EKG, telemetry) interpretation:  NSR  Radiology interpretation: See above  Labs reviewed: See above  Any tests that were considered but not ordered: none     Decision rules/scores evaluated (example [id number], Wells, etc): N/A     Discussed with: Patient     Care Planning  Shared decision making: Patient  Code status and discussions: Full code    Disposition  Social Determinants of Health that impact treatment or disposition: none  Estimated length of stay is to be determined.     I confirmed that the patient's advanced care plan is present, code status is documented, and a surrogate decision maker is listed in the patient's medical record.     The patient's surrogate decision maker is Adams Gomes, spouse.     The patient was seen and examined by me on 9/04/2023 at 2020.    Electronically signed by Claudio Kauffman MD, 09/04/23, 20:20 CDT.

## 2023-09-05 NOTE — PROGRESS NOTES
"Pharmacy Dosing Service  Anticoagulant  Enoxaparin    Assessment/Action/Plan:  Active Hospital Problems    Diagnosis  POA    **UTI (urinary tract infection) [N39.0]  Yes    TOMÁS (acute kidney injury) [N17.9]  Yes     Lovenox ordered for VTE prophylaxis  Est CrCl < 30  Adjust to renal dosing, Lovenox 30 mg subQ daily.     Subjective:  Jennifer Gomes is a 82 y.o. female on Enoxaparin 30 mg SQ every 24 hours for indication of VTE prophylaxis.  Objective:  [Ht: 157.5 cm (62.01\"); Wt: 75.5 kg (166 lb 7.2 oz); BMI: Body mass index is 30.44 kg/m².]  Estimated Creatinine Clearance: 25.7 mL/min (A) (by C-G formula based on SCr of 1.66 mg/dL (H)).        Lab Results   Component Value Date    HGB 9.6 (L) 09/04/2023      Lab Results   Component Value Date     09/04/2023       Patel Tamayo, PharmD  09/04/23 21:20 CDT     "

## 2023-09-05 NOTE — CASE MANAGEMENT/SOCIAL WORK
Discharge Planning Assessment  [facility]     Patient Name: Jennifer Gomes  MRN: [id number]  Today's Date: 9/5/2023    Admit Date: 9/4/2023        Discharge Needs Assessment       Row Name 09/05/23 0836       Living Environment    People in Home child(sebastian), adult;spouse    Name(s) of People in Home Spouse - Adams Gomes; Dona - Ivonne Liang    Current Living Arrangements home    Primary Care Provided by child(sebastian);self    Provides Primary Care For no one, unable/limited ability to care for self    Family Caregiver if Needed child(sebastian), adult;spouse    Family Caregiver Names Daughter - Ivonne Liang; Spouse - Adams Gomes    Quality of Family Relationships unable to assess    Able to Return to Prior Arrangements other (see comments)    Living Arrangement Comments May need rehab placement.       Resource/Environmental Concerns    Transportation Concerns none       Food Insecurity    Within the past 12 months, you worried that your food would run out before you got the money to buy more. Never true    Within the past 12 months, the food you bought just didn't last and you didn't have money to get more. Never true       Transition Planning    Patient/Family Anticipated Services at Transition skilled nursing    Transportation Anticipated family or friend will provide;health plan transportation       Discharge Needs Assessment    Readmission Within the Last 30 Days no previous admission in last 30 days    Concerns to be Addressed care coordination/care conferences;discharge planning    Anticipated Changes Related to Illness inability to care for self    Outpatient/Agency/Support Group Needs skilled nursing facility    Discharge Facility/Level of Care Needs nursing facility, skilled    Current Discharge Risk chronically ill    Discharge Coordination/Progress Received call as  on call from ER nurse who advised patient was brought in by EMS due to fall at home and laid in floor for an excessive amount of time.   Patient was reportedly covered in urine in feces.  Patient resides at home with her spouse and daughter.  It is unclear as to why patient was allowed to lay in floor without assistance.  APS report made, web ID# [id number].  Patient may require SNF placement at discharge.                   Discharge Plan    No documentation.                 Continued Care and Services - Admitted Since 9/4/2023    Coordination has not been started for this encounter.          Demographic Summary    No documentation.                  Functional Status    No documentation.                  Psychosocial    No documentation.                  Abuse/Neglect    No documentation.                  Legal    No documentation.                  Substance Abuse    No documentation.                  Patient Forms    No documentation.                     JEFRY Wood

## 2023-09-05 NOTE — CASE MANAGEMENT/SOCIAL WORK
Continued Stay Note   [facility]     Patient Name: Jennifer Gomes  MRN: [id number]  Today's Date: 9/5/2023    Admit Date: 9/4/2023    Plan: Home   Discharge Plan       Row Name 09/05/23 1430       Plan    Plan Home    Plan Comments Spoke with pt's daughter, Ivonne [phone]. She lives at home with pt and her spouse. She said pt laid in the floor for 24 hours. She said that pt was well aware of what was going on and refused to let them call EMS. She says pt will not agree to snf. She has had appointments to go to therapy as outpatient but usually cancels those appointments. She has had HH in the past and may agree to that. Waiting to hear status of APS.                   Discharge Codes    No documentation.                       OSKAR Vázquez

## 2023-09-05 NOTE — ED NOTES
"Nursing report ED to floor  Jennifer Gomes  82 y.o.  female    HPI:   Chief Complaint   Patient presents with    Fall    Altered Mental Status       Admitting doctor:   Claudio Kauffman MD    Consulting provider(s):  Consults       No orders found from 8/6/2023 to 9/5/2023.             Admitting diagnosis:   The primary encounter diagnosis was Acute cystitis without hematuria. Diagnoses of Non-traumatic rhabdomyolysis, TOMÁS (acute kidney injury), Acute congestive heart failure, unspecified heart failure type, Failure to thrive in adult, and Fall, initial encounter were also pertinent to this visit.    Code status:   Current Code Status       Date Active Code Status Order ID Comments User Context       Prior            Allergies:   Ropinirole hcl, Codeine, Definity [perflutren lipid microsphere], Ambien [zolpidem], Eszopiclone, Pregabalin, and Tizanidine    Intake and Output    Intake/Output Summary (Last 24 hours) at 9/4/2023 2047  Last data filed at 9/4/2023 1856  Gross per 24 hour   Intake 1000 ml   Output --   Net 1000 ml       Weight:       09/04/23 1709   Weight: 83.9 kg (185 lb)       Most recent vitals:   Vitals:    09/04/23 1709 09/04/23 1712 09/04/23 2017 09/04/23 2046   BP: 137/83  117/58 109/58   BP Location: Right arm      Patient Position: Lying      Pulse: 97  93 90   Resp: 17   14   Temp:  97.5 °F (36.4 °C)  97.6 °F (36.4 °C)   TempSrc:  Oral  Oral   SpO2: 95%  94% 98%   Weight: 83.9 kg (185 lb)      Height: 157.5 cm (62\")        Oxygen Therapy: .    Active LDAs/IV Access:   Lines, Drains & Airways       Active LDAs       Name Placement date Placement time Site Days    Peripheral IV 09/04/23 1653 Left Antecubital 09/04/23 1653  Antecubital  less than 1    Peripheral IV 09/04/23 Posterior;Right Hand 09/04/23  --  Hand  less than 1    External Urinary Catheter 09/04/23 2043  --  less than 1                    Labs (abnormal labs have a star):   Labs Reviewed   COMPREHENSIVE METABOLIC PANEL - " Abnormal; Notable for the following components:       Result Value    Glucose 114 (*)     BUN 51 (*)     Creatinine 1.66 (*)     Potassium 3.1 (*)     CO2 17.0 (*)     Albumin 3.1 (*)     AST (SGOT) 43 (*)     BUN/Creatinine Ratio 30.7 (*)     Anion Gap 16.0 (*)     eGFR 31.1 (*)     All other components within normal limits    Narrative:     GFR Normal >60  Chronic Kidney Disease <60  Kidney Failure <15    The GFR formula is only valid for adults with stable renal function between ages 18 and 70.   LIPASE - Abnormal; Notable for the following components:    Lipase 7 (*)     All other components within normal limits   CK - Abnormal; Notable for the following components:    Creatine Kinase 676 (*)     All other components within normal limits   URINALYSIS W/ MICROSCOPIC IF INDICATED (NO CULTURE) - Abnormal; Notable for the following components:    Color, UA Dark Yellow (*)     Appearance, UA Turbid (*)     Ketones, UA Trace (*)     Bilirubin, UA Small (1+) (*)     Blood, UA Large (3+) (*)     Protein,  mg/dL (2+) (*)     Leuk Esterase, UA Large (3+) (*)     Nitrite, UA Positive (*)     All other components within normal limits   BNP (IN-HOUSE) - Abnormal; Notable for the following components:    proBNP 2,431.0 (*)     All other components within normal limits    Narrative:     Among patients with dyspnea, NT-proBNP is highly sensitive for the detection of acute congestive heart failure. In addition NT-proBNP of <300 pg/ml effectively rules out acute congestive heart failure with 99% negative predictive value.     LACTIC ACID, PLASMA - Abnormal; Notable for the following components:    Lactate 2.1 (*)     All other components within normal limits   TROPONIN - Abnormal; Notable for the following components:    HS Troponin T 23 (*)     All other components within normal limits    Narrative:     High Sensitive Troponin T Reference Range:  <10.0 ng/L- Negative Female for AMI  <15.0 ng/L- Negative Male for AMI  >=10  - Abnormal Female indicating possible myocardial injury.  >=15 - Abnormal Male indicating possible myocardial injury.   Clinicians would have to utilize clinical acumen, EKG, Troponin, and serial changes to determine if it is an Acute Myocardial Infarction or myocardial injury due to an underlying chronic condition.        CBC WITH AUTO DIFFERENTIAL - Abnormal; Notable for the following components:    WBC 20.62 (*)     RBC 3.11 (*)     Hemoglobin 9.6 (*)     Hematocrit 29.5 (*)     MPV 12.9 (*)     All other components within normal limits    Narrative:     The previously reported component NRBC is no longer being reported. Previous result was 0.0 /100 WBC (Reference Range: 0.0-0.2 /100 WBC) on 9/4/2023 at 1749 CDT.   MANUAL DIFFERENTIAL - Abnormal; Notable for the following components:    Lymphocyte % 7.0 (*)     Monocyte % 4.0 (*)     Bands %  34.0 (*)     Metamyelocyte % 3.0 (*)     Neutrophils Absolute 17.73 (*)     All other components within normal limits   URINALYSIS, MICROSCOPIC ONLY - Abnormal; Notable for the following components:    RBC, UA 13-20 (*)     WBC, UA Too Numerous to Count (*)     Bacteria, UA 4+ (*)     Squamous Epithelial Cells, UA 3-6 (*)     All other components within normal limits   HIGH SENSITIVITIY TROPONIN T 2HR - Abnormal; Notable for the following components:    HS Troponin T 21 (*)     All other components within normal limits    Narrative:     High Sensitive Troponin T Reference Range:  <10.0 ng/L- Negative Female for AMI  <15.0 ng/L- Negative Male for AMI  >=10 - Abnormal Female indicating possible myocardial injury.  >=15 - Abnormal Male indicating possible myocardial injury.   Clinicians would have to utilize clinical acumen, EKG, Troponin, and serial changes to determine if it is an Acute Myocardial Infarction or myocardial injury due to an underlying chronic condition.        MAGNESIUM - Normal   LACTIC ACID, REFLEX - Normal   BLOOD CULTURE   BLOOD CULTURE   URINE CULTURE    CBC AND DIFFERENTIAL    Narrative:     The following orders were created for panel order CBC & Differential.  Procedure                               Abnormality         Status                     ---------                               -----------         ------                     CBC Auto Differential[[id number]]        Abnormal            Final result                 Please view results for these tests on the individual orders.       Meds given in ED:   Medications   cefTRIAXone (ROCEPHIN) 1,000 mg in sodium chloride 0.9 % 100 mL IVPB (has no administration in time range)   ALPRAZolam (XANAX) tablet 1 mg (has no administration in time range)   aspirin tablet 325 mg (has no administration in time range)   atorvastatin (LIPITOR) tablet 40 mg (has no administration in time range)   carvedilol (COREG) tablet 12.5 mg (has no administration in time range)   donepezil (ARICEPT) tablet 10 mg (has no administration in time range)   levothyroxine (SYNTHROID, LEVOTHROID) tablet 50 mcg (has no administration in time range)   pantoprazole (PROTONIX) EC tablet 40 mg (has no administration in time range)   oxyCODONE (ROXICODONE) immediate release tablet 5 mg (has no administration in time range)   sertraline (ZOLOFT) tablet 25 mg (has no administration in time range)   venlafaxine (EFFEXOR) tablet 25 mg (has no administration in time range)   sodium chloride 0.9 % flush 10 mL (has no administration in time range)   sodium chloride 0.9 % flush 10 mL (has no administration in time range)   sodium chloride 0.9 % infusion 40 mL (has no administration in time range)   sennosides-docusate (PERICOLACE) 8.6-50 MG per tablet 2 tablet (has no administration in time range)     And   polyethylene glycol (MIRALAX) packet 17 g (has no administration in time range)     And   bisacodyl (DULCOLAX) EC tablet 5 mg (has no administration in time range)     And   bisacodyl (DULCOLAX) suppository 10 mg (has no administration in time range)    Enoxaparin Sodium (LOVENOX) syringe 40 mg (has no administration in time range)   sodium chloride 0.9 % infusion (has no administration in time range)   Potassium Replacement - Follow Nurse / BPA Driven Protocol (has no administration in time range)   Magnesium Standard Dose Replacement - Follow Nurse / BPA Driven Protocol (has no administration in time range)   Phosphorus Replacement - Follow Nurse / BPA Driven Protocol (has no administration in time range)   Calcium Replacement - Follow Nurse / BPA Driven Protocol (has no administration in time range)   acetaminophen (TYLENOL) tablet 650 mg (has no administration in time range)   ondansetron (ZOFRAN) injection 4 mg (has no administration in time range)   lactated ringers bolus 1,000 mL (0 mL Intravenous Stopped 9/4/23 1856)   cefTRIAXone (ROCEPHIN) 1,000 mg in sodium chloride 0.9 % 100 mL IVPB (1,000 mg Intravenous New Bag 9/4/23 1943)     sodium chloride, 75 mL/hr         NIH Stroke Scale:       Isolation/Infection(s):  No active isolations   No active infections     COVID Testing  Collected .  Resulted .    Nursing report ED to floor:  Mental status: .  Ambulatory status: .  Precautions: .    ED nurse phone extentsion- ..

## 2023-09-05 NOTE — PLAN OF CARE
Goal Outcome Evaluation:  Plan of Care Reviewed With: patient      VSS. Safety maintained. IV Flagyl continues.IVF. Had potassium runs today.

## 2023-09-05 NOTE — THERAPY DISCHARGE NOTE
"Acute Care - Speech Language Pathology   Swallow Initial Evaluation/Discharge [facility]     Patient Name: Jennifer Gomes  : 1940  MRN: [id number]  Today's Date: 2023               Admit Date: 2023   clinical bedside swallow evaluation completed secondary to failed swallow screen. Pt admitted for acute UTI and fall. Hx of GERD, uterine CA, and HTN. Pt noted to have difficulty maintaining alertness for first half of evaluation. Pt is oriented to name and place. Disoriented to month and year. When repositioning pt upright in bed, pt stated, \"don't put me in the sand.\" ST repeated pt statement to ensure accuracy and pt stated, \"yup.\" Pt did become more alert as PO trials presented with no difficulty following commands. Feel initial confusion likely d/t UTI dx. Oral cavity noted to be dry secondary to mouth breathing noted upon ST arrival. Pt reports no previous difficulty with PO and stated only trouble is poor appetite at times. Full range of consistencies presented except mech soft. Timely swallow with all trials. Adequate mastication of solids. Bleching noted after thin and nectar via straw. No overt s/s of aspiration noted.     Pt okay to begin regular solids diet and thin liquids. Meds whole with thin liquids. General aspiration precautions with PO. ST cannot fully r/o silent aspiration. RN to continue to monitor for any increased lung congestion. ST to sign off at this time. MD to reconsult if change or new concern.  Carissa Blackwell MS-CCC/SLP, CNT 2023 08:49 CDT    Visit Dx:    ICD-10-CM ICD-9-CM   1. Acute cystitis without hematuria  N30.00 595.0   2. Non-traumatic rhabdomyolysis  M62.82 728.88   3. TOMÁS (acute kidney injury)  N17.9 584.9   4. Acute congestive heart failure, unspecified heart failure type  I50.9 428.0   5. Failure to thrive in adult  R62.7 783.7   6. Fall, initial encounter  W19.XXXA E888.9   7. Dysphagia, unspecified type  R13.10 787.20     Patient Active Problem List "   Diagnosis    Gastroesophageal reflux disease    Spinal stenosis, lumbar region, without neurogenic claudication    Overweight    Non-smoker    Erosion of vaginal mesh    Adenocarcinoma of endometrium    Encounter for consultation    S/P hysterectomy with oophorectomy    Encounter for follow-up surveillance of endometrial cancer    History of radiation therapy    Obesity (BMI 30-39.9)    Non-traumatic rhabdomyolysis    Metabolic encephalopathy    Acute renal failure superimposed on stage 3 chronic kidney disease    Acute respiratory failure with hypoxia and hypercapnia    Medical non-compliance, does not take narcotics as prescribed    SIRS (systemic inflammatory response syndrome)    Chronic constipation    Chronic pain syndrome    Chronic prescription opiate use    Normocytic anemia    Hypothyroidism (acquired)    Essential hypertension    TOMÁS (acute kidney injury)    Venous insufficiency of both lower extremities    Fluid retention    Low blood pressure reading    Obesity, Class III, BMI 40-49.9 (morbid obesity)    Chronic intractable headache    RAFAL (obstructive sleep apnea)    Change in bowel habits    Altered mental status    Toxic metabolic encephalopathy    Polypharmacy    Frequent falls    Grade III internal hemorrhoids    External hemorrhoids    UTI (urinary tract infection)     Past Medical History:   Diagnosis Date    Anxiety     Arthritis     Bronchitis     Cancer     uterine    Chronic pain     Depression     Disease of thyroid gland     Fibromyalgia     GERD (gastroesophageal reflux disease)     Headache     History of transfusion     AS     Hyperlipidemia     Hypertension     Incontinence     Insomnia     Leg pain     Lumbar stenosis     Migraines     Peptic ulcer     Restless legs     Sleep apnea     NO C-PAP    UTI (urinary tract infection)     Vaginal bleeding      Past Surgical History:   Procedure Laterality Date    BLADDER REPAIR      MESH HAD TO BE REMOVED IN 2013    BREAST BIOPSY  Right 2017    benign    BREAST CYST EXCISION Left 1982    CARDIAC CATHETERIZATION      CARPAL TUNNEL RELEASE      CATARACT EXTRACTION W/ INTRAOCULAR LENS  IMPLANT, BILATERAL      COLONOSCOPY      COLONOSCOPY N/A 10/01/2021    Procedure: COLONOSCOPY WITH ANESTHESIA;  Surgeon: Tom Velasco DO;  Location: [facility] ENDOSCOPY;  Service: Gastroenterology;  Laterality: N/A;  pre: change in bowel habits  post: diverticulosis. hemorrhoids.   Olivia Mora, MORGAN        CYSTECTOMY      D & C HYSTEROSCOPY N/A 11/06/2017    Procedure: DILATATION AND CURETTAGE HYSTEROSCOPY;  Surgeon: Shasta Madrigal MD;  Location: [facility] OR;  Service:     DILATION AND CURETTAGE, DIAGNOSTIC / THERAPEUTIC  2008    ENDOSCOPY  09/23/2010    Short segment of Arriola's,Moderate chroninc esophagogastritis and negative H.pylori    ENDOSCOPY N/A 09/25/2017    Procedure: ESOPHAGOGASTRODUODENOSCOPY WITH ANESTHESIA;  Surgeon: Tom Velasco DO;  Location: [facility] ENDOSCOPY;  Service:     EYE SURGERY      RETINA    HEMORRHOIDECTOMY SIGMOIDOSCOPY N/A 3/21/2023    Procedure: HEMORRHOIDECTOMY WITH EXAM UNDER ANESTHESIA;  Surgeon: Holly Chavez MD;  Location: [facility] OR;  Service: General;  Laterality: N/A;    HYSTERECTOMY  12/20/2017    ORIF TIBIA/FIBULA FRACTURES Left 2000    TRANSVAGINAL TAPING SUSPENSION N/A 11/06/2017    Procedure: VAGINAL MESH REVISION;  Surgeon: Shasta Madrigal MD;  Location: [facility] OR;  Service:     VAGINAL MESH REVISION  2013       SLP Recommendation and Plan  SLP Swallowing Diagnosis: swallow WFL/no suspected pharyngeal impairment (09/05/23 0807)  SLP Diet Recommendation: regular textures, thin liquids (09/05/23 0807)     Monitor for Signs of Aspiration: yes, notify SLP if any concerns, cough, gurgly voice, throat clearing, upper respiratory, right lower lobe infiltrates (09/05/23 0807)     Swallow Criteria for Skilled Therapeutic Interventions Met: no problems identified which require skilled intervention (09/05/23  "0807)  Anticipated Discharge Disposition (SLP): unknown (09/05/23 0847)     Therapy Frequency (Swallow): evaluation only (09/05/23 0807)              Anticipated Discharge Disposition (SLP): unknown (09/05/23 0847)           Reason for Discharge: other (see comments) (eval) (09/05/23 0847)                Plan of Care Reviewed With: patient (09/05/23 0845)  Progress: no change (eval) (09/05/23 0845)  Outcome Evaluation: ST clinical bedside swallow evaluation completed secondary to failed swallow screen. Pt admitted for acute UTI and fall. Hx of GERD, uterine CA, and HTN. Pt noted to have difficulty maintaining alertness for first half of evaluation. Pt is oriented to name and place. Disoriented to month and year. When repositioning pt upright in bed, pt stated, \"don't put me in the sand.\" ST repeated pt statement to ensure accuracy and pt stated, \"yup.\" Pt did become more alert as PO trials presented with no difficulty following commands. Feel initial confusion likely d/t UTI dx. Oral cavity noted to be dry secondary to mouth breathing noted upon ST arrival. Pt reports no previous difficulty with PO and stated only trouble is poor appetite at times. Full range of consistencies presented except mech soft. Timely swallow with all trials. Adequate mastication of solids. Bleching noted after thin and nectar via straw. No overt s/s of aspiration noted. Pt okay to begin regular solids diet and thin liquids. Meds whole with thin liquids. General aspiration precautions with PO. ST cannot fully r/o silent aspiration. RN to continue to monitor for any increased lung congestion. ST to sign off at this time. MD to reconsult if change or new concern. (09/05/23 0845)    SWALLOW EVALUATION (last 72 hours)       SLP Adult Swallow Evaluation       Row Name 09/05/23 0807                   Rehab Evaluation    Document Type evaluation  -BN        Subjective Information complains of;pain  -BN        Patient Observations alert;cooperative "  fatigues easily  -BN        Patient/Family/Caregiver Comments/Observations no family present  -BN        Patient Effort adequate  -BN           General Information    Patient Profile Reviewed yes  -BN        Pertinent History Of Current Problem acute UTI and fall. Hx of GERD, uterine CA, and HTN  -BN        Current Method of Nutrition NPO  -BN        Precautions/Limitations, Vision WFL;for purposes of eval  -BN        Precautions/Limitations, Hearing WFL;for purposes of eval  -BN        Prior Level of Function-Communication unknown  -BN        Prior Level of Function-Swallowing no diet consistency restrictions  -BN        Plans/Goals Discussed with patient;other (see comments)  RN  -BN        Barriers to Rehab none identified  -BN        Patient's Goals for Discharge patient did not state  -BN           Pain    Additional Documentation Pain Scale: FACES Pre/Post-Treatment (Group)  -BN           Pain Scale: Numbers Pre/Post-Treatment    Pain Intervention(s) Medication (See MAR)  -BN           Pain Scale: FACES Pre/Post-Treatment    Pain: FACES Scale, Pretreatment 2-->hurts little bit  -BN        Posttreatment Pain Rating 0-->no hurt  -BN           Oral Motor Structure and Function    Dentition Assessment teeth are in poor condition  -BN        Secretion Management WNL/WFL  -BN        Mucosal Quality dry  -BN           Oral Musculature and Cranial Nerve Assessment    Oral Motor General Assessment generalized oral motor weakness  -BN           General Eating/Swallowing Observations    Respiratory Support Currently in Use nasal cannula  -BN        Eating/Swallowing Skills self-fed  -BN        Positioning During Eating upright in bed  -BN        Utensils Used spoon;straw  -BN        Consistencies Trialed regular textures;pudding thick;honey-thick liquids;nectar/syrup-thick liquids;thin liquids  -BN           Clinical Swallow Eval    Oral Prep Phase WFL  -BN        Oral Transit WFL  -BN        Oral Residue WFL  -BN      "   Pharyngeal Phase WFL  -BN        Esophageal Phase suspected esophageal impairment  -BN        Clinical Swallow Evaluation Summary ST clinical bedside swallow evaluation completed secondary to failed swallow screen. Pt admitted for acute UTI and fall. Hx of GERD, uterine CA, and HTN. Pt noted to have difficulty maintaining alertness for first half of evaluation. Pt is oriented to name and place. Disoriented to month and year. When repositioning pt upright in bed, pt stated, \"don't put me in the sand.\" ST repeated pt statement to ensure accuracy and pt stated, \"yup.\" Pt did become more alert as PO trials presented with no difficulty following commands. Oral cavity noted to be dry secondary to mouth breathing noted upon ST arrival. Full range of consistencies presented except mech soft. Timely swallow with all trials. Adequate mastication of solids. Bleching noted after thin and nectar via straw. No overt s/s of aspiration noted. Pt okay to begin regular solids diet and thin liquids. Meds whole with thin liquids. General aspiration precautions with PO. ST cannot fully r/o silent aspiration. RN to continue to monitor for any increased lung congestion. ST to sign off at this time. MD to reconsult if change or new concern.  -BN           Esophageal Phase Concerns    Esophageal Phase Concerns belching  -BN        Belching thin;nectar  -BN           SLP Evaluation Clinical Impression    SLP Swallowing Diagnosis swallow WFL/no suspected pharyngeal impairment  -BN        Functional Impact no impact on function  -BN        Swallow Criteria for Skilled Therapeutic Interventions Met no problems identified which require skilled intervention  -BN           SLP Treatment Clinical Impressions    Care Plan Review evaluation/treatment results reviewed  -BN        Care Plan Review, Other Participant(s) caregiver  -BN           Recommendations    Therapy Frequency (Swallow) evaluation only  -BN        SLP Diet Recommendation regular " textures;thin liquids  -BN        Recommended Precautions and Strategies upright posture during/after eating;small bites of food and sips of liquid;general aspiration precautions  -BN        Oral Care Recommendations Oral Care BID/PRN  -BN        SLP Rec. for Method of Medication Administration meds whole;with thin liquids  -BN        Monitor for Signs of Aspiration yes;notify SLP if any concerns;cough;gurgly voice;throat clearing;upper respiratory;right lower lobe infiltrates  -BN        Anticipated Discharge Disposition (SLP) unknown  -                  User Key  (r) = Recorded By, (t) = Taken By, (c) = Cosigned By      Initials Name Effective Dates    Carissa Reed MS-CCC/SLP, ABDOUL 07/11/23 -                     EDUCATION  The patient has been educated in the following areas:   Dysphagia (Swallowing Impairment).                 Time Calculation:    Time Calculation- SLP       Row Name 09/05/23 0848             Time Calculation- SLP    SLP Start Time 0807  -      SLP Stop Time 0848  -      SLP Time Calculation (min) 41 min  -BN      SLP Received On 09/05/23  -BN         Untimed Charges    SLP Eval/Re-eval  ST Eval Oral Pharyng Swallow - 47480  -BN      [id number] Eval Oral Pharyng Swallow Minutes 41  -BN         Total Minutes    Untimed Charges Total Minutes 41  -BN       Total Minutes 41  -BN                User Key  (r) = Recorded By, (t) = Taken By, (c) = Cosigned By      Initials Name Provider Type    Carissa Reed MS-CCC/SLP, ABDOUL Speech and Language Pathologist                    Therapy Charges for Today       Code Description Service Date Service Provider Modifiers Qty    [id number] HC ST EVAL ORAL PHARYNG SWALLOW 3 9/5/2023 Carissa Blackwell MS-CCC/SLP, ABDOUL GN 1                 SLP Discharge Summary  Anticipated Discharge Disposition (SLP): unknown  Reason for Discharge: other (see comments) (eval)  Progress Toward Achieving Short/long Term Goals: other (see comments)  (eval)  Discharge Destination: other (see comments) (still in acute care)    Carissa Blackwell, MS-CCC/SLP, CNT  9/5/2023

## 2023-09-06 ENCOUNTER — APPOINTMENT (OUTPATIENT)
Dept: CARDIOLOGY | Facility: HOSPITAL | Age: 83
DRG: 418 | End: 2023-09-06
Payer: MEDICARE

## 2023-09-06 ENCOUNTER — APPOINTMENT (OUTPATIENT)
Dept: ULTRASOUND IMAGING | Facility: HOSPITAL | Age: 83
DRG: 418 | End: 2023-09-06
Payer: MEDICARE

## 2023-09-06 PROBLEM — R74.01 TRANSAMINITIS: Status: ACTIVE | Noted: 2023-09-06

## 2023-09-06 PROBLEM — E44.0 MODERATE MALNUTRITION: Status: ACTIVE | Noted: 2023-09-06

## 2023-09-06 LAB
ALBUMIN SERPL-MCNC: 2.4 G/DL (ref 3.5–5.2)
ALBUMIN SERPL-MCNC: 2.5 G/DL (ref 3.5–5.2)
ALBUMIN/GLOB SERPL: 0.8 G/DL
ALBUMIN/GLOB SERPL: 0.9 G/DL
ALP SERPL-CCNC: 350 U/L (ref 39–117)
ALP SERPL-CCNC: 413 U/L (ref 39–117)
ALT SERPL W P-5'-P-CCNC: 501 U/L (ref 1–33)
ALT SERPL W P-5'-P-CCNC: 526 U/L (ref 1–33)
ANION GAP SERPL CALCULATED.3IONS-SCNC: 10 MMOL/L (ref 5–15)
ANION GAP SERPL CALCULATED.3IONS-SCNC: 11 MMOL/L (ref 5–15)
ANISOCYTOSIS BLD QL: ABNORMAL
AST SERPL-CCNC: 833 U/L (ref 1–32)
AST SERPL-CCNC: 997 U/L (ref 1–32)
BH CV ECHO MEAS - AO MAX PG: 11 MMHG
BH CV ECHO MEAS - AO MEAN PG: 6 MMHG
BH CV ECHO MEAS - AO ROOT DIAM: 2.9 CM
BH CV ECHO MEAS - AO V2 MAX: 166 CM/SEC
BH CV ECHO MEAS - AO V2 VTI: 35.6 CM
BH CV ECHO MEAS - AVA(I,D): 2.7 CM2
BH CV ECHO MEAS - EDV(CUBED): 70.4 ML
BH CV ECHO MEAS - EDV(MOD-SP2): 39.1 ML
BH CV ECHO MEAS - EDV(MOD-SP4): 39.5 ML
BH CV ECHO MEAS - EF(MOD-BP): 70.2 %
BH CV ECHO MEAS - EF(MOD-SP2): 66.2 %
BH CV ECHO MEAS - EF(MOD-SP4): 74.4 %
BH CV ECHO MEAS - ESV(CUBED): 27.8 ML
BH CV ECHO MEAS - ESV(MOD-SP2): 13.2 ML
BH CV ECHO MEAS - ESV(MOD-SP4): 10.1 ML
BH CV ECHO MEAS - FS: 26.6 %
BH CV ECHO MEAS - IVS/LVPW: 0.95 CM
BH CV ECHO MEAS - IVSD: 0.94 CM
BH CV ECHO MEAS - LA DIMENSION: 4.6 CM
BH CV ECHO MEAS - LAT PEAK E' VEL: 7.4 CM/SEC
BH CV ECHO MEAS - LV DIASTOLIC VOL/BSA (35-75): 21.4 CM2
BH CV ECHO MEAS - LV MASS(C)D: 127.1 GRAMS
BH CV ECHO MEAS - LV MAX PG: 9.4 MMHG
BH CV ECHO MEAS - LV MEAN PG: 5 MMHG
BH CV ECHO MEAS - LV SYSTOLIC VOL/BSA (12-30): 5.5 CM2
BH CV ECHO MEAS - LV V1 MAX: 153 CM/SEC
BH CV ECHO MEAS - LV V1 VTI: 30.6 CM
BH CV ECHO MEAS - LVIDD: 4.1 CM
BH CV ECHO MEAS - LVIDS: 3 CM
BH CV ECHO MEAS - LVOT AREA: 3.1 CM2
BH CV ECHO MEAS - LVOT DIAM: 2 CM
BH CV ECHO MEAS - LVPWD: 0.99 CM
BH CV ECHO MEAS - MED PEAK E' VEL: 6.6 CM/SEC
BH CV ECHO MEAS - MV A MAX VEL: 168 CM/SEC
BH CV ECHO MEAS - MV DEC TIME: 0.23 MSEC
BH CV ECHO MEAS - MV E MAX VEL: 137 CM/SEC
BH CV ECHO MEAS - MV E/A: 0.82
BH CV ECHO MEAS - PI END-D VEL: 149 CM/SEC
BH CV ECHO MEAS - RAP SYSTOLE: 5 MMHG
BH CV ECHO MEAS - RVSP: 37.7 MMHG
BH CV ECHO MEAS - SI(MOD-SP2): 14 ML/M2
BH CV ECHO MEAS - SI(MOD-SP4): 15.9 ML/M2
BH CV ECHO MEAS - SV(LVOT): 96.1 ML
BH CV ECHO MEAS - SV(MOD-SP2): 25.9 ML
BH CV ECHO MEAS - SV(MOD-SP4): 29.4 ML
BH CV ECHO MEAS - TR MAX PG: 32.7 MMHG
BH CV ECHO MEAS - TR MAX VEL: 286 CM/SEC
BH CV ECHO MEASUREMENTS AVERAGE E/E' RATIO: 19.57
BILIRUB SERPL-MCNC: 1.2 MG/DL (ref 0–1.2)
BILIRUB SERPL-MCNC: 1.3 MG/DL (ref 0–1.2)
BUN SERPL-MCNC: 31 MG/DL (ref 8–23)
BUN SERPL-MCNC: 33 MG/DL (ref 8–23)
BUN/CREAT SERPL: 37.8 (ref 7–25)
BUN/CREAT SERPL: 40.2 (ref 7–25)
BURR CELLS BLD QL SMEAR: ABNORMAL
CALCIUM SPEC-SCNC: 8.7 MG/DL (ref 8.6–10.5)
CALCIUM SPEC-SCNC: 8.9 MG/DL (ref 8.6–10.5)
CHLORIDE SERPL-SCNC: 113 MMOL/L (ref 98–107)
CHLORIDE SERPL-SCNC: 114 MMOL/L (ref 98–107)
CK SERPL-CCNC: 150 U/L (ref 20–180)
CO2 SERPL-SCNC: 17 MMOL/L (ref 22–29)
CO2 SERPL-SCNC: 19 MMOL/L (ref 22–29)
CREAT SERPL-MCNC: 0.82 MG/DL (ref 0.57–1)
CREAT SERPL-MCNC: 0.82 MG/DL (ref 0.57–1)
DEPRECATED RDW RBC AUTO: 55.1 FL (ref 37–54)
EGFRCR SERPLBLD CKD-EPI 2021: 72.4 ML/MIN/1.73
EGFRCR SERPLBLD CKD-EPI 2021: 72.4 ML/MIN/1.73
ERYTHROCYTE [DISTWIDTH] IN BLOOD BY AUTOMATED COUNT: 15.4 % (ref 12.3–15.4)
GLOBULIN UR ELPH-MCNC: 2.8 GM/DL
GLOBULIN UR ELPH-MCNC: 3.3 GM/DL
GLUCOSE SERPL-MCNC: 118 MG/DL (ref 65–99)
GLUCOSE SERPL-MCNC: 143 MG/DL (ref 65–99)
HAV IGM SERPL QL IA: NORMAL
HBV CORE IGM SERPL QL IA: NORMAL
HBV SURFACE AG SERPL QL IA: NORMAL
HCT VFR BLD AUTO: 24.2 % (ref 34–46.6)
HCV AB SER DONR QL: NORMAL
HGB BLD-MCNC: 7.4 G/DL (ref 12–15.9)
INR PPP: 1.36 (ref 0.91–1.09)
LEFT ATRIUM VOLUME INDEX: 54.1 ML/M2
LEFT ATRIUM VOLUME: 100 ML
LYMPHOCYTES # BLD MANUAL: 0.43 10*3/MM3 (ref 0.7–3.1)
LYMPHOCYTES NFR BLD MANUAL: 4.1 % (ref 5–12)
MCH RBC QN AUTO: 30.2 PG (ref 26.6–33)
MCHC RBC AUTO-ENTMCNC: 30.6 G/DL (ref 31.5–35.7)
MCV RBC AUTO: 98.8 FL (ref 79–97)
MONOCYTES # BLD: 0.43 10*3/MM3 (ref 0.1–0.9)
NEUTROPHILS # BLD AUTO: 9.54 10*3/MM3 (ref 1.7–7)
NEUTROPHILS NFR BLD MANUAL: 87.6 % (ref 42.7–76)
NEUTS BAND NFR BLD MANUAL: 4.1 % (ref 0–5)
PLAT MORPH BLD: NORMAL
PLATELET # BLD AUTO: 151 10*3/MM3 (ref 140–450)
PMV BLD AUTO: 12.3 FL (ref 6–12)
POIKILOCYTOSIS BLD QL SMEAR: ABNORMAL
POLYCHROMASIA BLD QL SMEAR: ABNORMAL
POTASSIUM SERPL-SCNC: 3.4 MMOL/L (ref 3.5–5.2)
POTASSIUM SERPL-SCNC: 3.5 MMOL/L (ref 3.5–5.2)
PROT SERPL-MCNC: 5.2 G/DL (ref 6–8.5)
PROT SERPL-MCNC: 5.8 G/DL (ref 6–8.5)
PROTHROMBIN TIME: 16.9 SECONDS (ref 11.8–14.8)
RBC # BLD AUTO: 2.45 10*6/MM3 (ref 3.77–5.28)
SODIUM SERPL-SCNC: 142 MMOL/L (ref 136–145)
SODIUM SERPL-SCNC: 142 MMOL/L (ref 136–145)
VARIANT LYMPHS NFR BLD MANUAL: 4.1 % (ref 19.6–45.3)
VIT B12 BLD-MCNC: >2000 PG/ML (ref 211–946)
WBC MORPH BLD: NORMAL
WBC NRBC COR # BLD: 10.4 10*3/MM3 (ref 3.4–10.8)

## 2023-09-06 PROCEDURE — 25010000002 METRONIDAZOLE 500 MG/100ML SOLUTION

## 2023-09-06 PROCEDURE — 82550 ASSAY OF CK (CPK): CPT

## 2023-09-06 PROCEDURE — 25010000002 CEFTRIAXONE PER 250 MG: Performed by: FAMILY MEDICINE

## 2023-09-06 PROCEDURE — 85007 BL SMEAR W/DIFF WBC COUNT: CPT | Performed by: FAMILY MEDICINE

## 2023-09-06 PROCEDURE — 93306 TTE W/DOPPLER COMPLETE: CPT | Performed by: INTERNAL MEDICINE

## 2023-09-06 PROCEDURE — 82607 VITAMIN B-12: CPT

## 2023-09-06 PROCEDURE — 76705 ECHO EXAM OF ABDOMEN: CPT

## 2023-09-06 PROCEDURE — 85025 COMPLETE CBC W/AUTO DIFF WBC: CPT | Performed by: FAMILY MEDICINE

## 2023-09-06 PROCEDURE — 85610 PROTHROMBIN TIME: CPT

## 2023-09-06 PROCEDURE — 80053 COMPREHEN METABOLIC PANEL: CPT

## 2023-09-06 PROCEDURE — 93306 TTE W/DOPPLER COMPLETE: CPT

## 2023-09-06 PROCEDURE — 80074 ACUTE HEPATITIS PANEL: CPT

## 2023-09-06 PROCEDURE — 97167 OT EVAL HIGH COMPLEX 60 MIN: CPT | Performed by: OCCUPATIONAL THERAPIST

## 2023-09-06 RX ORDER — POTASSIUM CHLORIDE 750 MG/1
20 CAPSULE, EXTENDED RELEASE ORAL 2 TIMES DAILY WITH MEALS
Status: COMPLETED | OUTPATIENT
Start: 2023-09-06 | End: 2023-09-06

## 2023-09-06 RX ADMIN — ALPRAZOLAM 1 MG: 0.5 TABLET ORAL at 00:15

## 2023-09-06 RX ADMIN — POTASSIUM CHLORIDE 20 MEQ: 10 CAPSULE, COATED, EXTENDED RELEASE ORAL at 10:35

## 2023-09-06 RX ADMIN — DONEPEZIL HYDROCHLORIDE 10 MG: 10 TABLET, FILM COATED ORAL at 21:08

## 2023-09-06 RX ADMIN — METRONIDAZOLE 500 MG: 500 INJECTION, SOLUTION INTRAVENOUS at 05:47

## 2023-09-06 RX ADMIN — OXYCODONE HYDROCHLORIDE 5 MG: 5 TABLET ORAL at 21:08

## 2023-09-06 RX ADMIN — POLYETHYLENE GLYCOL 3350 17 G: 17 POWDER, FOR SOLUTION ORAL at 10:35

## 2023-09-06 RX ADMIN — CARVEDILOL 12.5 MG: 6.25 TABLET, FILM COATED ORAL at 10:37

## 2023-09-06 RX ADMIN — VENLAFAXINE 25 MG: 50 TABLET ORAL at 10:37

## 2023-09-06 RX ADMIN — CARVEDILOL 12.5 MG: 6.25 TABLET, FILM COATED ORAL at 17:19

## 2023-09-06 RX ADMIN — LEVOTHYROXINE SODIUM 50 MCG: 50 TABLET ORAL at 10:37

## 2023-09-06 RX ADMIN — METRONIDAZOLE 500 MG: 500 INJECTION, SOLUTION INTRAVENOUS at 13:03

## 2023-09-06 RX ADMIN — PANTOPRAZOLE SODIUM 40 MG: 40 TABLET, DELAYED RELEASE ORAL at 05:48

## 2023-09-06 RX ADMIN — ASPIRIN 325 MG: 325 TABLET, FILM COATED ORAL at 10:37

## 2023-09-06 RX ADMIN — METRONIDAZOLE 500 MG: 500 INJECTION, SOLUTION INTRAVENOUS at 21:08

## 2023-09-06 RX ADMIN — SODIUM CHLORIDE 75 ML/HR: 9 INJECTION, SOLUTION INTRAVENOUS at 03:54

## 2023-09-06 RX ADMIN — SENNOSIDES AND DOCUSATE SODIUM 2 TABLET: 50; 8.6 TABLET ORAL at 10:35

## 2023-09-06 RX ADMIN — POTASSIUM CHLORIDE 20 MEQ: 10 CAPSULE, COATED, EXTENDED RELEASE ORAL at 17:18

## 2023-09-06 RX ADMIN — SODIUM CHLORIDE 75 ML/HR: 9 INJECTION, SOLUTION INTRAVENOUS at 19:22

## 2023-09-06 RX ADMIN — OXYCODONE HYDROCHLORIDE 5 MG: 5 TABLET ORAL at 13:35

## 2023-09-06 RX ADMIN — CEFTRIAXONE SODIUM 1000 MG: 1 INJECTION, POWDER, FOR SOLUTION INTRAMUSCULAR; INTRAVENOUS at 19:27

## 2023-09-06 RX ADMIN — SENNOSIDES AND DOCUSATE SODIUM 2 TABLET: 50; 8.6 TABLET ORAL at 21:08

## 2023-09-06 RX ADMIN — SERTRALINE HYDROCHLORIDE 25 MG: 50 TABLET, FILM COATED ORAL at 10:38

## 2023-09-06 RX ADMIN — Medication 10 ML: at 21:08

## 2023-09-06 NOTE — PLAN OF CARE
Goal Outcome Evaluation:  Plan of Care Reviewed With: patient        Progress: improving  Outcome Evaluation: IV FLUIDS AND IV ANTIBIOTICS PER ORDER. ULTRASOUND LIVER AND GB TODAY. O2 ON PER NASAL CANNULA. CONT. PULSE OX INTACT. PURE WIK IN PLACE. PRN PAIN MED AVAILABLE AND PT. HAS REQUIRED THIS SHIFT. PT/OT WORKS WITH PT. NO DISTRESS NOTED.

## 2023-09-06 NOTE — CASE MANAGEMENT/SOCIAL WORK
Continued Stay Note   [facility]     Patient Name: Jennifer Gomes  MRN: [id number]  Today's Date: 9/6/2023    Admit Date: 9/4/2023    Plan: Home   Discharge Plan       Row Name 09/06/23 0929       Plan    Plan Comments SW received call from Blanka CERON who advised she will be coming to the hospital today to see patient.                   Discharge Codes    No documentation.                       JEFRY Wood

## 2023-09-06 NOTE — PLAN OF CARE
"Goal Outcome Evaluation:  Plan of Care Reviewed With: patient        Progress: no change  Outcome Evaluation: OT eval completed.  Pt. is AxO to self.  She is confused, irritable, & believes she lives at the hospital.  Per the chart the patient was on floor at home in her urine and feces for an extended period of time.  Ms. Yap is aware she \"fought with my family to not call EMS.\"  Chart states the SW has alerted APS.  OTR called family home to obtain pt PLOF and home set up.  Pt.'s DTR reports she assistst with bathing.  Ms. Gomes required Max A x 2 to come to EOB and Max A x 1 to roll.  OTR applied glide pad to improve pt.'s comfort & reduce burden of care on staff applied glide pad to improve pt.'s comfort & reduce burden of care on staff.  Transfers not appropriate to attempt at this time.  Pt Dep for sock KARLOS and I anticipate she will be Dep for toileting while supine.  She is currently using a purewick. Ms. Gomes makes little attempt at this time to participate in ADLs, she is lethargic, weak, demo's poor balance, has deficits in act booker, and is extremely debilitated.  She would benefit from cont'd OT tx to improve her indep in self care and fxl mobility.      Anticipated Discharge Disposition (OT): skilled nursing facility  "

## 2023-09-06 NOTE — PLAN OF CARE
Goal Outcome Evaluation:  Plan of Care Reviewed With: patient        Progress: no change  Patient c/o pain to bilat legs and hips when turning pt.  PRN pain med given with pt drowsy all night. Pt answers orientation questions appropriately at times and is confused at times. At times patient struggles to follow commands. Neuro's intact. Pt on 1 L/min per NC while sleeping. Continuous pulse ox in use. Patient turned Q2. External catheter in place. VSS. IVF and antibiotics admin as ordered. safety maintained.

## 2023-09-06 NOTE — PLAN OF CARE
Goal Outcome Evaluation:  Plan of Care Reviewed With: patient        Progress: no change  Outcome Evaluation: NTN assessment. Screen unsure of weight loss, MST 3. Visited pt at bedside. Pt reports of weight loss the past few months due to poor appetite. Pt is unsure of how much. Per weight report, pt weight has decreased from 197 lbs. to 166 lbs. since 3/14/23 (-15.8% weight change). Weight changes significant. Pt refused NFPE. Indicators of malnutrition present. Pt has moderate malnutrition in the context of chronic illness. View details in progress note. Pt stated she does drink ONS (boost) daily at home. I encouraged intake and recommended pt to increase amount of boost to BID or switch to Boost Plus. Pt verbalized acceptance and understanding. Unable to determine average PO intake due to insufficient data. Pt was NPO this morning and did not receive breakfast. Ordering Boost Plus BID to help meet pt’s estimated calorie-protein needs. Will continue to follow per protocol.

## 2023-09-06 NOTE — PROGRESS NOTES
[facility] Medicine Services  INPATIENT PROGRESS NOTE    Patient Name: Jennifer Gomes  Date of Admission: 9/4/2023  Today's Date: 09/06/23  Length of Stay: 2  Primary Care Physician: Olivia Mora APRN    Subjective   Chief Complaint: Tired and back is sore  HPI   Patient examined lying in bed on 1 L nasal cannula.  She remains somewhat drowsy but is overall more alert today.  She tells me she remains tired and is generally sore without any 1 specific area hurting more than the rest.  She is yet to have a bowel movement.  She refused her Dulcolax suppository this morning so I reiterated to her the need to take this.  Her mild abdominal cramping continues at times.  Transaminitis noted today and right upper quadrant ultrasound ordered, Lovenox discontinued, statin discontinued.  Pharmacy consult to identify hepatotoxins.  Leukocytosis improved, potassium will be replaced.  Echocardiogram pending today.  Await urine culture and susceptibility.    Review of Systems   All pertinent negatives and positives are as above. All other systems have been reviewed and are negative unless otherwise stated.     Objective    Temp:  [97.5 °F (36.4 °C)-98.6 °F (37 °C)] 98.3 °F (36.8 °C)  Heart Rate:  [70-89] 73  Resp:  [16-18] 18  BP: (116-135)/(40-53) 135/53  Physical Exam  Vitals and nursing note reviewed.   Constitutional:       Appearance: Normal appearance.      Comments: Up in bed, no acute distress, 1 L nasal cannula, no visitors at bedside   HENT:      Head: Normocephalic and atraumatic.   Cardiovascular:      Rate and Rhythm: Normal rate and regular rhythm.      Pulses: Normal pulses.      Heart sounds: Normal heart sounds. No murmur heard.    No friction rub.   Pulmonary:      Effort: Pulmonary effort is normal. No respiratory distress.      Breath sounds: Normal breath sounds. No stridor. No wheezing or rhonchi.   Abdominal:      General: Bowel sounds are normal.      Palpations:  Abdomen is soft.      Tenderness: There is no abdominal tenderness. There is no guarding.      Comments: Abdominal distention   Musculoskeletal:         General: No swelling or deformity. Normal range of motion.      Cervical back: Normal range of motion and neck supple. No rigidity or tenderness.      Right lower leg: No edema.      Left lower leg: No edema.   Skin:     General: Skin is warm and dry.      Capillary Refill: Capillary refill takes less than 2 seconds.      Coloration: Skin is not pale.      Findings: No bruising or erythema.   Neurological:      General: No focal deficit present.      Mental Status: She is alert and oriented to person, place, and time. Mental status is at baseline.      Sensory: No sensory deficit.      Gait: Gait normal.   Psychiatric:         Mood and Affect: Mood normal.         Behavior: Behavior normal.         Thought Content: Thought content normal.         Judgment: Judgment normal.       Results Review:  I have reviewed the labs, radiology results, and diagnostic studies.    Laboratory Data:   Results from last 7 days   Lab Units 09/06/23  0353 09/05/23  0539 09/04/23  1657   WBC 10*3/mm3 10.40 13.50* 20.62*   HEMOGLOBIN g/dL 7.4* 8.8* 9.6*   HEMATOCRIT % 24.2* 28.0* 29.5*   PLATELETS 10*3/mm3 151 150 182          Results from last 7 days   Lab Units 09/06/23  0353 09/05/23  0539 09/04/23  1657   SODIUM mmol/L 142 140 138   POTASSIUM mmol/L 3.5 2.9* 3.1*   CHLORIDE mmol/L 114* 110* 105   CO2 mmol/L 17.0* 17.0* 17.0*   BUN mg/dL 33* 42* 51*   CREATININE mg/dL 0.82 1.10* 1.66*   CALCIUM mg/dL 8.9 8.9 9.7   BILIRUBIN mg/dL 1.2  --  0.4   ALK PHOS U/L 350*  --  91   ALT (SGPT) U/L 526*  --  28   AST (SGOT) U/L 997*  --  43*   GLUCOSE mg/dL 118* 116* 114*       Radiology Data:   Imaging Results (Last 24 Hours)       Procedure Component Value Units Date/Time    US Abdomen Limited [[id number]] Collected: 09/06/23 1113     Updated: 09/06/23 1120    Narrative:      US ABDOMEN LIMITED-  9/6/2023 9:24 AM CDT     REASON FOR EXAM: transaminitis; N30.00-Acute cystitis without hematuria;  M62.82-Rhabdomyolysis; N17.9-Acute kidney failure, unspecified;  I50.9-Heart failure, unspecified; R62.7-Adult failure to thrive;  W19.XXXA-Unspecified fall, initial encounter; R13.10-Dysphagia,  unspecified       COMPARISON: CT abdomen pelvis 9/4/2023.      TECHNIQUE: Multiple longitudinal and transverse realtime sonographic  images of the right upper quadrant of the abdomen are obtained.      FINDINGS:    Pancreas: Normal in size and echogenicity.      Liver: Normal in size, echogenicity and echotexture. No focal lesion.      Gallbladder: The gallbladder is mildly distended. No visualized stones.  Borderline gallbladder wall thickening measuring up to 3 mm. No  pericholecystic fluid..      Bile ducts: The CBD measures 0.7 cm in diameter. There is no  intrahepatic or extrahepatic ductal dilation.     Right kidney: Measures 9.2 cm in length. Normal in sonographic  appearance.     Other: No ascites.        Impression:         Mildly distended gallbladder with borderline gallbladder wall  thickening. Recommend correlation for early acute or chronic  cholecystitis. No visualized gallstones. No pericholecystic fluid. This  could be further evaluated with HIDA scan if clinically indicated.  This report was finalized on 09/06/2023 11:17 by  Sunny Degroot DO.            I have reviewed the patient's current medications.     Assessment/Plan   Assessment  Active Hospital Problems    Diagnosis     **UTI (urinary tract infection)     Moderate malnutrition     Colitis     TOMÁS (acute kidney injury)     Chronic pain syndrome     Chronic prescription opiate use     Hypothyroidism (acquired)     Essential hypertension     Non-traumatic rhabdomyolysis     Spinal stenosis, lumbar region, without neurogenic claudication      Upon arrival:  Patient presented to [facility] emergency department on 9/4/2023 with complaints of back  "pain.  EMS had reportedly been contacted after patient was down at home.  She was covered in stool and urine.  She stated she had been on the ground for \"a couple of days\".  She lives at home with her  and daughter, it was unknown why she had been on the floor as long as she had.  Work-up in the emergency department revealed TOMÁS, elevated CK, concerning urinalysis, potassium 3.1, WBC 20.6.  Chest x-ray negative, CT abdomen pelvis shows large volume stool in rectum and distal colon with differential of acute colitis versus stercoral colitis.  CT lumbar spine negative for fracture CT T-spine negative for fracture CT head negative for acute process, CT C-spine negative for fracture, x-ray left shoulder negative for fracture.  Patient was admitted for further monitoring and management.    Current treatment Plan  UTI-  Blood culture x2 no growth at 24 hours  Urine culture shows gram-negative bacilli, ID and susceptibility pending  WBC trended from 13-10  Patient has been afebrile since the evening of 9/4  Continue IV Rocephin  CBC in a.m.    Transaminitis/acute hepatitis-  On arrival, AST 43, ALT 28.    On 9/6, , , alk phos 350.  Taking into consideration patient's liver enzymes were fairly normal upon arrival, less of a concern for home medications.  Pharmacy consultation was placed to identify hepatotoxins with medications and are new since arrival.  Potential involvement with Lovenox, Rocephin, Flagyl.  However, low risk with Rocephin and Flagyl.  Lovenox has been discontinued as well as PTA statin, Xanax, Tylenol.    Discussion with Dr. Kent, plan to check acute hepatitis panel and closely monitor.    Right upper quadrant ultrasound was obtained and shows borderline gallbladder wall thickening without gallstones or dilation.  WBC normal.  PT/INR ordered and pending  Total bilirubin normal at 1.2  Will repeat CMP this afternoon as well as in the morning.  Plan to continue Rocephin and Flagyl at " this time as liver enzymes are closely trended.    Colitis-  Acute infectious versus stercoral.  Flagyl initiated 9/5 and continues  Bowel regimen continues although patient refuses some medications  Leukocytosis resolved  Patient still needs to have a bowel movement    Echocardiogram ordered on admission with results pending    TOMÁS-  IV normal saline at 75 mL/h  Creatinine improving from 1.66- 0.82 BMP in AM.    Anemia-  History of.  Hemoglobin in June 10.6.  Hemoglobin on arrival 9.6 and has trended down to 7.4, I suspect this is partially dilutional.  No obvious signs or symptoms of bleeding.  Occult stool remains pending (patient has not had a bowel movement since arrival)  Vitamin B12 level elevated.  Recheck CBC in a.m.    Hypokalemia-  Potassium on arrival 3.1, replacement thereafter.  Repeat potassium 2.9, 40 mill equivalents oral potassium ordered.  Phosphorus and magnesium level checked and normal.  Today serum potassium low end of normal at 3.5.  20 mEq twice daily x1 day potassium ordered.  BMP in AM.    Nontraumatic rhabdomyolysis-  CK trend 676, 528, 150.  Continue IV fluids.  Continue rest.  Recheck CK in AM.    Lumbar spinal stenosis with chronic pain-  Efforts at pain control.  Tylenol, Dilaudid, oxycodone.  PT/OT    Essential hypertension-  Continue Coreg 12.5 twice daily    Hypothyroidism-  Continue Synthroid    Lovenox for DVT prophylaxis    Case management/ consulted and following for events prior to admission as well as discharge disposition coordination.    Medical Decision Making  Number and Complexity of problems: 1 acute problem of moderate complexity in UTI.  1 acute problem of moderate complexity in colitis.  1 acute problem of moderate complexity in acute kidney injury.  1 acute problem of moderate complexity in hypokalemia.  1 acute problem of moderate complexity in nontraumatic rhabdomyolysis.  3 chronic problems of moderate complexity in lumbar spinal stenosis with  chronic pain, essential hypertension, hypothyroidism  Differential Diagnosis: None    Conditions and Status        Status stable     MDM Data  External documents reviewed: None  Cardiac tracing (EKG, telemetry) interpretation: EKG reviewed per cardiologist or potation  Radiology interpretation: X-ray left shoulder, CT C-spine, CT head, CT T-spine, CT L-spine, CT abdomen pelvis, chest x-ray reviewed per radiologist interpretation  Labs reviewed: CK, proBNP, CMP, CBC with differential, urinalysis, urine culture, blood culture  Any tests that were considered but not ordered: None     Decision rules/scores evaluated (example [id number], [place], etc): None     Discussed with: Patient and Dr. Chavarria     Care Planning  Shared decision making: Discussed with above, patient agreeable to her plan of care  Code status and discussions: Full    Disposition  Social Determinants of Health that impact treatment or disposition: Patient's current living situation, down on the floor at her home for an extended amount of time without the assistance of family to help her up or seek care.  Discharge disposition undeterminable at this time, likely SNF.    Electronically signed by MORGAN Shields, 09/06/23, 12:27 CDT.

## 2023-09-06 NOTE — CONSULTS
Pharmacy Consult: Medication-Related Transaminitis    Assessment/Action/Plan:  Pharmacy consulted to investigate possible hepatotoxic medications introduced this hospital stay. The following medications have been initiated since admission with the listed possible adverse effect of elevated transaminases:    Enoxaparin ~6%  Ceftriaxone <6%  Metronidazole - postmarketing incidences but frequency not defined  Alprazolam - postmarketing incidences but frequency not defined    Thank you for the consult, please don't hesitate to contact pharmacy for any further questions.       Subjective:  Jennifer Gomes is a 82 y.o. female     Additional Factors Considered:  Patient disposition per documentation  Disease state or condition being treated    Lab Results   Component Value Date    GLUCOSE 118 (H) 09/06/2023    BUN 33 (H) 09/06/2023    CREATININE 0.82 09/06/2023    EGFR 72.4 09/06/2023    BCR 40.2 (H) 09/06/2023    K 3.5 09/06/2023    CO2 17.0 (L) 09/06/2023    CALCIUM 8.9 09/06/2023    ALBUMIN 2.4 (L) 09/06/2023    BILITOT 1.2 09/06/2023     (H) 09/06/2023     (H) 09/06/2023        Current Facility-Administered Medications:     acetaminophen (TYLENOL) suppository 650 mg, 650 mg, Rectal, Q4H PRN, Claudio Kauffman MD, 650 mg at 09/05/23 0027    acetaminophen (TYLENOL) tablet 650 mg, 650 mg, Oral, Q4H PRN, Claudio Kauffman MD    ALPRAZolam (XANAX) tablet 1 mg, 1 mg, Oral, TID PRN, Claudio Kauffman MD, 1 mg at 09/06/23 0015    aspirin tablet 325 mg, 325 mg, Oral, Daily, Claudio Kauffman MD, 325 mg at 09/06/23 1037    [DISCONTINUED] sennosides-docusate (PERICOLACE) 8.6-50 MG per tablet 2 tablet, 2 tablet, Oral, BID, 2 tablet at 09/05/23 0842 **AND** [DISCONTINUED] polyethylene glycol (MIRALAX) packet 17 g, 17 g, Oral, Daily PRN **AND** bisacodyl (DULCOLAX) EC tablet 5 mg, 5 mg, Oral, Daily PRN **AND** [DISCONTINUED] bisacodyl (DULCOLAX) suppository 10 mg, 10 mg, Rectal, Daily PRN,  Claudio Kauffman MD    bisacodyl (DULCOLAX) suppository 10 mg, 10 mg, Rectal, Daily, Rogers Lloyd APRN, 10 mg at 09/05/23 0832    carvedilol (COREG) tablet 12.5 mg, 12.5 mg, Oral, BID With Meals, Claudio Kauffman MD, 12.5 mg at 09/06/23 1037    cefTRIAXone (ROCEPHIN) 1,000 mg in sodium chloride 0.9 % 100 mL IVPB, 1,000 mg, Intravenous, Q24H, Claudio Kauffman MD, Last Rate: 200 mL/hr at 09/05/23 2057, 1,000 mg at 09/05/23 2057    donepezil (ARICEPT) tablet 10 mg, 10 mg, Oral, Nightly, Claudio Kauffman MD, 10 mg at 09/05/23 2058    levothyroxine (SYNTHROID, LEVOTHROID) tablet 50 mcg, 50 mcg, Oral, QAM, Claudio Kauffman MD, 50 mcg at 09/06/23 1037    metroNIDAZOLE (FLAGYL) IVPB 500 mg, 500 mg, Intravenous, Q8H, Rogers Lloyd APRN, Last Rate: 100 mL/hr at 09/06/23 0547, 500 mg at 09/06/23 0547    nitroglycerin (NITROSTAT) SL tablet 0.4 mg, 0.4 mg, Sublingual, Q5 Min PRN, Claudio Kauffman MD    ondansetron (ZOFRAN) injection 4 mg, 4 mg, Intravenous, Q6H PRN, Claudio Kauffman MD    oxyCODONE (ROXICODONE) immediate release tablet 5 mg, 5 mg, Oral, Q8H PRN, Claudio Kauffman MD, 5 mg at 09/05/23 1855    pantoprazole (PROTONIX) EC tablet 40 mg, 40 mg, Oral, Q AM, Claudio Kauffman MD, 40 mg at 09/06/23 0548    Pharmacy Consult, , Does not apply, Continuous PRN, Rogers Lloyd APRN    polyethylene glycol (MIRALAX) packet 17 g, 17 g, Oral, Daily, Rogers Lloyd APRN, 17 g at 09/06/23 1035    potassium chloride (MICRO-K) CR capsule 20 mEq, 20 mEq, Oral, BID With Meals, Rogers Lloyd APRN, 20 mEq at 09/06/23 1035    sennosides-docusate (PERICOLACE) 8.6-50 MG per tablet 2 tablet, 2 tablet, Oral, BID, Rogers Lloyd APRN, 2 tablet at 09/06/23 1035    sertraline (ZOLOFT) tablet 25 mg, 25 mg, Oral, Daily, Claudio Kauffman MD, 25 mg at 09/06/23 1038    sodium chloride 0.9 % flush 10 mL, 10 mL, Intravenous, Q12H, Claudio Kauffman MD, 10 mL  at 09/05/23 2058    sodium chloride 0.9 % flush 10 mL, 10 mL, Intravenous, PRN, Claudio Kauffman MD    sodium chloride 0.9 % infusion 40 mL, 40 mL, Intravenous, PRN, Claudio Kauffman MD    sodium chloride 0.9 % infusion, 75 mL/hr, Intravenous, Continuous, Claudio Kauffman MD, Last Rate: 75 mL/hr at 09/06/23 0354, 75 mL/hr at 09/06/23 0354    venlafaxine (EFFEXOR) tablet 25 mg, 25 mg, Oral, Daily, Claudio Kauffman MD, 25 mg at 09/06/23 [facility]     Khanh Helm, PharmD  09/06/23 11:30 CDT

## 2023-09-06 NOTE — PROGRESS NOTES
Adult Nutrition  Assessment/PES    Patient Name:  Jennifer Gomes  YOB: 1940  MRN: [id number]  Admit Date:  9/4/2023    Assessment Date:  9/6/2023    NTN assessment. Screen unsure of weight loss, MST 3. Visited pt at bedside. Pt reports of weight loss the past few months due to poor appetite. Pt is unsure of how much. Per weight report, pt weight has decreased from 197 lbs. to 166 lbs. since 3/14/23 (-15.8% weight change). Weight changes significant. Pt refused NFPE. Indicators of malnutrition present. Pt has moderate malnutrition in the context of chronic illness. View details in progress note. Pt stated she does drink ONS (boost) daily at home. I encouraged intake and recommended pt to increase amount of boost to BID or switch to Boost Plus. Pt verbalized acceptance and understanding. Unable to determine average PO intake due to insufficient data. Pt was NPO this morning and did not receive breakfast. Ordering Boost Plus BID to help meet pt's estimated calorie-protein needs. Will continue to follow per protocol.     Reason for Assessment       Row Name 09/06/23 1007          Reason for Assessment    Reason For Assessment identified at risk by screening criteria     Diagnosis infection/sepsis;renal disease;trauma     Identified At Risk by Screening Criteria MST SCORE 2+                    Nutrition/Diet History       Row Name 09/06/23 1007          Nutrition/Diet History    Typical Intake (Food/Fluid/EN/PN) ED due to falling and remaining on the ground for 24 hrs. PMHx lumber stenosis, chronic pain, RLS, HTN, RAFAL. Dx UTI, TOMÁS, chronic pain syndrome, chronic prescription opiate use, hypothyroidism, RLS, spinal stenosis. Screen unsure of weight loss, MST 3. Visited pt at bedside. Pt reports of weight loss the past few months due to poor appetite. Pt is unsure of how much. Per weight report, pt weight has decreased from 197 lbs. to 166 lbs. since 3/14/23 (-15.8% weight change). Weight changes  significant. Pt refused NFPE. Indicators of malnutrition present. Pt has moderate malnutrition in the context of chronic illness. Pt stated she does drink ONS (boost) daily at home. I encouraged intake and recommended pt to increase amount of boost to BID or switch to Boost Plus. Pt verbalized acceptance and understanding. Unable to determine average PO intake due to insufficient data. Pt was NPO this morning and did not receive breakfast. Ordering Boost Plus BID to help meet pt’s estimated calorie-protein needs.     Factors Affecting Nutritional Intake appetite;pain                    Labs/Tests/Procedures/Meds       Row Name 09/06/23 1009          Labs/Procedures/Meds    Lab Results Reviewed reviewed, pertinent     Lab Results Comments Cl 114, BUN 33, Glu 118, , TP 5.2, Alb 2.4, , , H/H        Diagnostic Tests/Procedures    Diagnostic Test/Procedure Reviewed reviewed        Medications    Pertinent Medications Reviewed reviewed     Pertinent Medications Comments coreg, dulcolax, synthroid, miralax, micro-k, pericolace, zoloft,effexor                    Physical Findings       Row Name 09/06/23 1012          Physical Findings    Overall Physical Appearance NC. Edema. BR 17. UC. Last BM unknown.                    Estimated/Assessed Needs - Anthropometrics       Row Name 09/06/23 1012          Anthropometrics    Weight for Calculation 75.5 kg (166 lb 7.2 oz)        Estimated/Assessed Needs    Additional Documentation KCAL/KG (Group);Fluid Requirements (Group);Protein Requirements (Group)        KCAL/KG    KCAL/KG 25 Kcal/Kg (kcal);20 Kcal/Kg (kcal)     20 Kcal/Kg (kcal) 1510     25 Kcal/Kg (kcal) 1887.5        Protein Requirements    Weight Used For Protein Calculations 75.5 kg (166 lb 7.2 oz)     Est Protein Requirement Amount (gms/kg) 1.0 gm protein     Estimated Protein Requirements (gms/day) 75.5        Fluid Requirements    Fluid Requirements (mL/day) 1887.5     Estimated Fluid Requirement  Method other (see comments)  25 mL/kg     RDA Method (mL) 1887.5                    Nutrition Prescription Ordered       Row Name 09/06/23 1013          Nutrition Prescription PO    Current PO Diet Regular     Fluid Consistency Thin                    Evaluation of Received Nutrient/Fluid Intake       Row Name 09/06/23 1013          Nutrient/Fluid Evaluation    Number of Days Evaluated 1 day     Additional Documentation Fluid Intake Evaluation (Group);Intake Assessment (Group)        Fluid Intake Evaluation    Oral Fluid (mL) 40     Other Fluid (mL) 100        PO Evaluation    Number of Days PO Intake Evaluated 1 day     Number of Meals 1     % PO Intake 25% of lunch                    Malnutrition Severity Assessment       Row Name 09/06/23 1032          Malnutrition Severity Assessment    Malnutrition Type Chronic Disease - Related Malnutrition        Insufficient Energy Intake     Insufficient Energy Intake Findings Moderate     Insufficient Energy Intake  <75% of est. energy requirement for > or equal to 1 month        Unintentional Weight Loss     Unintentional Weight Loss Findings Severe     Unintentional Weight Loss  Weight loss greater than 7.5% in three months        Fat Loss    Subcutaneous Fat Loss Findings Moderate     Orbital Region  Moderate -  somewhat hollowness, slightly dark circles        Criteria Met (Must meet criteria for severity in at least 2 of these categories: M Wasting, Fat Loss, Fluid, Secondary Signs, Wt. Status, Intake)    Patient meets criteria for  Moderate (non-severe) Malnutrition                     Problem/Interventions:   Problem 1       Row Name 09/06/23 1030          Nutrition Diagnoses Problem 1    Problem 1 Malnutrition     Etiology (related to) Factors Affecting Nutrition;Medical Diagnosis     Gastrointestinal Other (comment)  inflammation of large intestine     Renal TOMÁS     Appetite Poor     Condition Pain     Signs/Symptoms (evidenced by) Report of Mnimal PO  Intake;Unintended Weight Change;Report/Observation     Unintended Weight Change Loss     Number of Pounds Lost 31 lbs. (-15.8% weight change)     Weight loss time period 3/14 - 9/6     Reported/Observed By RD/Tech     Other Comment <75% of estimated energy requirement for > or equal to 1 month; Fat loss - orbital (moderate)                          Intervention Goal       Row Name 09/06/23 1033          Intervention Goal    General Disease management/therapy;Reduce/improve symptoms;Meet nutritional needs for age/condition;Provide information regarding MNT for treatment/condition     PO Increase intake;Meet estimated needs     Weight No significant weight loss                    Nutrition Intervention       Row Name 09/06/23 1033          Nutrition Intervention    RD/Tech Action Care plan reviewd;Recommend/ordered;Encourage intake     Recommended/Ordered Supplement                    Nutrition Prescription       Row Name 09/06/23 1033          Nutrition Prescription PO    PO Prescription Begin/change supplement     Supplement Boost Plus     Supplement Frequency 2 times a day     New PO Prescription Ordered? Yes                    Education/Evaluation       Row Name 09/06/23 1033          Education    Education Advised regarding habits/behavior     Advised Regarding Habits/Behavior Use supplement        Monitor/Evaluation    Monitor Per protocol;PO intake;Supplement intake                     Electronically signed by:  Washington Moore RD  09/06/23 10:35 CDT

## 2023-09-07 ENCOUNTER — APPOINTMENT (OUTPATIENT)
Dept: GENERAL RADIOLOGY | Facility: HOSPITAL | Age: 83
DRG: 418 | End: 2023-09-07
Payer: MEDICARE

## 2023-09-07 ENCOUNTER — TELEPHONE (OUTPATIENT)
Dept: GASTROENTEROLOGY | Facility: HOSPITAL | Age: 83
End: 2023-09-07

## 2023-09-07 ENCOUNTER — ANESTHESIA (OUTPATIENT)
Dept: PERIOP | Facility: HOSPITAL | Age: 83
DRG: 418 | End: 2023-09-07
Payer: MEDICARE

## 2023-09-07 ENCOUNTER — ANESTHESIA EVENT (OUTPATIENT)
Dept: PERIOP | Facility: HOSPITAL | Age: 83
DRG: 418 | End: 2023-09-07
Payer: MEDICARE

## 2023-09-07 PROBLEM — K81.0 ACUTE CHOLECYSTITIS: Status: ACTIVE | Noted: 2023-09-04

## 2023-09-07 PROBLEM — B96.20 E. COLI UTI: Status: ACTIVE | Noted: 2023-09-04

## 2023-09-07 PROBLEM — K81.9 CHOLECYSTITIS: Status: ACTIVE | Noted: 2023-09-04

## 2023-09-07 LAB
ALBUMIN SERPL-MCNC: 2.4 G/DL (ref 3.5–5.2)
ALBUMIN/GLOB SERPL: 0.8 G/DL
ALP SERPL-CCNC: 398 U/L (ref 39–117)
ALT SERPL W P-5'-P-CCNC: 434 U/L (ref 1–33)
ANION GAP SERPL CALCULATED.3IONS-SCNC: 12 MMOL/L (ref 5–15)
AST SERPL-CCNC: 531 U/L (ref 1–32)
BACTERIA SPEC AEROBE CULT: ABNORMAL
BILIRUB SERPL-MCNC: 0.6 MG/DL (ref 0–1.2)
BUN SERPL-MCNC: 25 MG/DL (ref 8–23)
BUN/CREAT SERPL: 34.2 (ref 7–25)
CALCIUM SPEC-SCNC: 9.1 MG/DL (ref 8.6–10.5)
CHLORIDE SERPL-SCNC: 112 MMOL/L (ref 98–107)
CK SERPL-CCNC: 49 U/L (ref 20–180)
CLUMPED PLATELETS: PRESENT
CO2 SERPL-SCNC: 16 MMOL/L (ref 22–29)
CREAT SERPL-MCNC: 0.73 MG/DL (ref 0.57–1)
D-LACTATE SERPL-SCNC: 1.3 MMOL/L (ref 0.5–2)
DEPRECATED RDW RBC AUTO: 60.5 FL (ref 37–54)
EGFRCR SERPLBLD CKD-EPI 2021: 83.3 ML/MIN/1.73
ERYTHROCYTE [DISTWIDTH] IN BLOOD BY AUTOMATED COUNT: 15.6 % (ref 12.3–15.4)
GIANT PLATELETS: ABNORMAL
GLOBULIN UR ELPH-MCNC: 3.2 GM/DL
GLUCOSE SERPL-MCNC: 120 MG/DL (ref 65–99)
HCT VFR BLD AUTO: 30.5 % (ref 34–46.6)
HEMOCCULT STL QL: NEGATIVE
HGB BLD-MCNC: 8.7 G/DL (ref 12–15.9)
HYPOCHROMIA BLD QL: ABNORMAL
INR PPP: 1.27 (ref 0.91–1.09)
LYMPHOCYTES # BLD MANUAL: 0.53 10*3/MM3 (ref 0.7–3.1)
LYMPHOCYTES NFR BLD MANUAL: 3.1 % (ref 5–12)
MACROCYTES BLD QL SMEAR: ABNORMAL
MCH RBC QN AUTO: 30.2 PG (ref 26.6–33)
MCHC RBC AUTO-ENTMCNC: 28.5 G/DL (ref 31.5–35.7)
MCV RBC AUTO: 105.9 FL (ref 79–97)
MONOCYTES # BLD: 0.26 10*3/MM3 (ref 0.1–0.9)
NEUTROPHILS # BLD AUTO: 7.6 10*3/MM3 (ref 1.7–7)
NEUTROPHILS NFR BLD MANUAL: 76 % (ref 42.7–76)
NEUTS BAND NFR BLD MANUAL: 14.6 % (ref 0–5)
PLATELET # BLD AUTO: 148 10*3/MM3 (ref 140–450)
PMV BLD AUTO: 11.9 FL (ref 6–12)
POLYCHROMASIA BLD QL SMEAR: ABNORMAL
POTASSIUM SERPL-SCNC: 3.4 MMOL/L (ref 3.5–5.2)
PROT SERPL-MCNC: 5.6 G/DL (ref 6–8.5)
PROTHROMBIN TIME: 16.1 SECONDS (ref 11.8–14.8)
RBC # BLD AUTO: 2.88 10*6/MM3 (ref 3.77–5.28)
SODIUM SERPL-SCNC: 140 MMOL/L (ref 136–145)
VARIANT LYMPHS NFR BLD MANUAL: 6.3 % (ref 19.6–45.3)
WBC MORPH BLD: NORMAL
WBC NRBC COR # BLD: 8.39 10*3/MM3 (ref 3.4–10.8)

## 2023-09-07 PROCEDURE — 25010000002 METRONIDAZOLE 500 MG/100ML SOLUTION: Performed by: SPECIALIST

## 2023-09-07 PROCEDURE — 82272 OCCULT BLD FECES 1-3 TESTS: CPT

## 2023-09-07 PROCEDURE — 74300 X-RAY BILE DUCTS/PANCREAS: CPT

## 2023-09-07 PROCEDURE — 85025 COMPLETE CBC W/AUTO DIFF WBC: CPT | Performed by: FAMILY MEDICINE

## 2023-09-07 PROCEDURE — 85610 PROTHROMBIN TIME: CPT

## 2023-09-07 PROCEDURE — 82550 ASSAY OF CK (CPK): CPT

## 2023-09-07 PROCEDURE — 25510000001 IOPAMIDOL 61 % SOLUTION: Performed by: SPECIALIST

## 2023-09-07 PROCEDURE — 85007 BL SMEAR W/DIFF WBC COUNT: CPT | Performed by: FAMILY MEDICINE

## 2023-09-07 PROCEDURE — BF131ZZ FLUOROSCOPY OF GALLBLADDER AND BILE DUCTS USING LOW OSMOLAR CONTRAST: ICD-10-PCS | Performed by: SPECIALIST

## 2023-09-07 PROCEDURE — 88304 TISSUE EXAM BY PATHOLOGIST: CPT | Performed by: SPECIALIST

## 2023-09-07 PROCEDURE — 25010000002 FENTANYL CITRATE (PF) 50 MCG/ML SOLUTION: Performed by: NURSE ANESTHETIST, CERTIFIED REGISTERED

## 2023-09-07 PROCEDURE — 99222 1ST HOSP IP/OBS MODERATE 55: CPT | Performed by: SPECIALIST

## 2023-09-07 PROCEDURE — 0FT44ZZ RESECTION OF GALLBLADDER, PERCUTANEOUS ENDOSCOPIC APPROACH: ICD-10-PCS | Performed by: SPECIALIST

## 2023-09-07 PROCEDURE — 25010000002 CEFTRIAXONE PER 250 MG: Performed by: SPECIALIST

## 2023-09-07 PROCEDURE — 25010000002 CEFOXITIN PER 1 G: Performed by: SPECIALIST

## 2023-09-07 PROCEDURE — 83605 ASSAY OF LACTIC ACID: CPT

## 2023-09-07 PROCEDURE — 25010000002 PROPOFOL 10 MG/ML EMULSION: Performed by: NURSE ANESTHETIST, CERTIFIED REGISTERED

## 2023-09-07 PROCEDURE — 76000 FLUOROSCOPY <1 HR PHYS/QHP: CPT

## 2023-09-07 PROCEDURE — 47563 LAPARO CHOLECYSTECTOMY/GRAPH: CPT | Performed by: SPECIALIST

## 2023-09-07 PROCEDURE — P9041 ALBUMIN (HUMAN),5%, 50ML: HCPCS | Performed by: NURSE ANESTHETIST, CERTIFIED REGISTERED

## 2023-09-07 PROCEDURE — 80053 COMPREHEN METABOLIC PANEL: CPT

## 2023-09-07 PROCEDURE — 25010000002 METRONIDAZOLE 500 MG/100ML SOLUTION

## 2023-09-07 PROCEDURE — C1726 CATH, BAL DIL, NON-VASCULAR: HCPCS | Performed by: SPECIALIST

## 2023-09-07 PROCEDURE — 25010000002 ALBUMIN HUMAN 5% PER 50 ML: Performed by: NURSE ANESTHETIST, CERTIFIED REGISTERED

## 2023-09-07 PROCEDURE — 25010000002 DEXAMETHASONE PER 1 MG: Performed by: NURSE ANESTHETIST, CERTIFIED REGISTERED

## 2023-09-07 PROCEDURE — 25010000002 SUGAMMADEX 200 MG/2ML SOLUTION: Performed by: NURSE ANESTHETIST, CERTIFIED REGISTERED

## 2023-09-07 DEVICE — HEMOST ABS SURGICEL SNOW 1X2IN: Type: IMPLANTABLE DEVICE | Site: ABDOMEN | Status: FUNCTIONAL

## 2023-09-07 DEVICE — LIGACLIP 10-M/L, 10MM ENDOSCOPIC ROTATING MULTIPLE CLIP APPLIERS
Type: IMPLANTABLE DEVICE | Site: ABDOMEN | Status: FUNCTIONAL
Brand: LIGACLIP

## 2023-09-07 RX ORDER — BUTALBITAL, ACETAMINOPHEN AND CAFFEINE 50; 325; 40 MG/1; MG/1; MG/1
1 TABLET ORAL 2 TIMES DAILY
Status: DISCONTINUED | OUTPATIENT
Start: 2023-09-07 | End: 2023-09-10 | Stop reason: HOSPADM

## 2023-09-07 RX ORDER — DROPERIDOL 2.5 MG/ML
0.62 INJECTION, SOLUTION INTRAMUSCULAR; INTRAVENOUS ONCE AS NEEDED
Status: DISCONTINUED | OUTPATIENT
Start: 2023-09-07 | End: 2023-09-07 | Stop reason: HOSPADM

## 2023-09-07 RX ORDER — FLUMAZENIL 0.1 MG/ML
0.2 INJECTION INTRAVENOUS AS NEEDED
Status: DISCONTINUED | OUTPATIENT
Start: 2023-09-07 | End: 2023-09-07 | Stop reason: HOSPADM

## 2023-09-07 RX ORDER — VECURONIUM BROMIDE 1 MG/ML
INJECTION, POWDER, LYOPHILIZED, FOR SOLUTION INTRAVENOUS AS NEEDED
Status: DISCONTINUED | OUTPATIENT
Start: 2023-09-07 | End: 2023-09-07 | Stop reason: SURG

## 2023-09-07 RX ORDER — FAMOTIDINE 20 MG/1
20 TABLET, FILM COATED ORAL 2 TIMES DAILY
Status: DISCONTINUED | OUTPATIENT
Start: 2023-09-07 | End: 2023-09-07 | Stop reason: SDUPTHER

## 2023-09-07 RX ORDER — ALBUMIN, HUMAN INJ 5% 5 %
SOLUTION INTRAVENOUS CONTINUOUS PRN
Status: DISCONTINUED | OUTPATIENT
Start: 2023-09-07 | End: 2023-09-07 | Stop reason: SURG

## 2023-09-07 RX ORDER — SIMETHICONE 80 MG
80 TABLET,CHEWABLE ORAL 4 TIMES DAILY PRN
Status: DISCONTINUED | OUTPATIENT
Start: 2023-09-07 | End: 2023-09-10 | Stop reason: HOSPADM

## 2023-09-07 RX ORDER — SODIUM CHLORIDE 0.9 % (FLUSH) 0.9 %
3 SYRINGE (ML) INJECTION AS NEEDED
Status: DISCONTINUED | OUTPATIENT
Start: 2023-09-07 | End: 2023-09-07 | Stop reason: HOSPADM

## 2023-09-07 RX ORDER — ACETAMINOPHEN 325 MG/1
975 TABLET ORAL EVERY 8 HOURS SCHEDULED
Status: DISCONTINUED | OUTPATIENT
Start: 2023-09-07 | End: 2023-09-10 | Stop reason: HOSPADM

## 2023-09-07 RX ORDER — ONDANSETRON 2 MG/ML
4 INJECTION INTRAMUSCULAR; INTRAVENOUS
Status: DISCONTINUED | OUTPATIENT
Start: 2023-09-07 | End: 2023-09-07 | Stop reason: HOSPADM

## 2023-09-07 RX ORDER — CYCLOBENZAPRINE HCL 10 MG
10 TABLET ORAL 3 TIMES DAILY PRN
Status: DISCONTINUED | OUTPATIENT
Start: 2023-09-07 | End: 2023-09-10 | Stop reason: HOSPADM

## 2023-09-07 RX ORDER — LIDOCAINE HYDROCHLORIDE 20 MG/ML
INJECTION, SOLUTION EPIDURAL; INFILTRATION; INTRACAUDAL; PERINEURAL AS NEEDED
Status: DISCONTINUED | OUTPATIENT
Start: 2023-09-07 | End: 2023-09-07 | Stop reason: SURG

## 2023-09-07 RX ORDER — ONDANSETRON 2 MG/ML
4 INJECTION INTRAMUSCULAR; INTRAVENOUS EVERY 6 HOURS PRN
Status: DISCONTINUED | OUTPATIENT
Start: 2023-09-07 | End: 2023-09-07 | Stop reason: SDUPTHER

## 2023-09-07 RX ORDER — LABETALOL HYDROCHLORIDE 5 MG/ML
5 INJECTION, SOLUTION INTRAVENOUS
Status: DISCONTINUED | OUTPATIENT
Start: 2023-09-07 | End: 2023-09-07 | Stop reason: HOSPADM

## 2023-09-07 RX ORDER — POTASSIUM CHLORIDE 750 MG/1
40 CAPSULE, EXTENDED RELEASE ORAL ONCE
Status: COMPLETED | OUTPATIENT
Start: 2023-09-07 | End: 2023-09-07

## 2023-09-07 RX ORDER — LIDOCAINE HYDROCHLORIDE 10 MG/ML
0.5 INJECTION, SOLUTION EPIDURAL; INFILTRATION; INTRACAUDAL; PERINEURAL ONCE AS NEEDED
Status: DISCONTINUED | OUTPATIENT
Start: 2023-09-07 | End: 2023-09-07 | Stop reason: HOSPADM

## 2023-09-07 RX ORDER — ROCURONIUM BROMIDE 10 MG/ML
INJECTION, SOLUTION INTRAVENOUS AS NEEDED
Status: DISCONTINUED | OUTPATIENT
Start: 2023-09-07 | End: 2023-09-07 | Stop reason: SURG

## 2023-09-07 RX ORDER — HYDROMORPHONE HYDROCHLORIDE 1 MG/ML
0.5 INJECTION, SOLUTION INTRAMUSCULAR; INTRAVENOUS; SUBCUTANEOUS
Status: DISCONTINUED | OUTPATIENT
Start: 2023-09-07 | End: 2023-09-07 | Stop reason: HOSPADM

## 2023-09-07 RX ORDER — FENTANYL CITRATE 50 UG/ML
INJECTION, SOLUTION INTRAMUSCULAR; INTRAVENOUS AS NEEDED
Status: DISCONTINUED | OUTPATIENT
Start: 2023-09-07 | End: 2023-09-07 | Stop reason: SURG

## 2023-09-07 RX ORDER — ONDANSETRON 8 MG/1
8 TABLET, ORALLY DISINTEGRATING ORAL EVERY 6 HOURS PRN
Status: DISCONTINUED | OUTPATIENT
Start: 2023-09-07 | End: 2023-09-10 | Stop reason: HOSPADM

## 2023-09-07 RX ORDER — MAGNESIUM HYDROXIDE 1200 MG/15ML
LIQUID ORAL AS NEEDED
Status: DISCONTINUED | OUTPATIENT
Start: 2023-09-07 | End: 2023-09-07 | Stop reason: HOSPADM

## 2023-09-07 RX ORDER — DEXAMETHASONE SODIUM PHOSPHATE 4 MG/ML
INJECTION, SOLUTION INTRA-ARTICULAR; INTRALESIONAL; INTRAMUSCULAR; INTRAVENOUS; SOFT TISSUE AS NEEDED
Status: DISCONTINUED | OUTPATIENT
Start: 2023-09-07 | End: 2023-09-07 | Stop reason: SURG

## 2023-09-07 RX ORDER — SUCRALFATE 1 G/1
1 TABLET ORAL
Status: DISCONTINUED | OUTPATIENT
Start: 2023-09-07 | End: 2023-09-10 | Stop reason: HOSPADM

## 2023-09-07 RX ORDER — SODIUM CHLORIDE 9 MG/ML
INJECTION, SOLUTION INTRAVENOUS AS NEEDED
Status: DISCONTINUED | OUTPATIENT
Start: 2023-09-07 | End: 2023-09-07 | Stop reason: HOSPADM

## 2023-09-07 RX ORDER — HYDROCODONE BITARTRATE AND ACETAMINOPHEN 7.5; 325 MG/1; MG/1
1 TABLET ORAL EVERY 4 HOURS PRN
Status: DISCONTINUED | OUTPATIENT
Start: 2023-09-07 | End: 2023-09-10 | Stop reason: HOSPADM

## 2023-09-07 RX ORDER — OXYCODONE AND ACETAMINOPHEN 10; 325 MG/1; MG/1
1 TABLET ORAL ONCE AS NEEDED
Status: DISCONTINUED | OUTPATIENT
Start: 2023-09-07 | End: 2023-09-07 | Stop reason: HOSPADM

## 2023-09-07 RX ORDER — BUPIVACAINE HYDROCHLORIDE AND EPINEPHRINE 5; 5 MG/ML; UG/ML
INJECTION, SOLUTION PERINEURAL AS NEEDED
Status: DISCONTINUED | OUTPATIENT
Start: 2023-09-07 | End: 2023-09-07 | Stop reason: HOSPADM

## 2023-09-07 RX ORDER — SODIUM CHLORIDE 9 MG/ML
100 INJECTION, SOLUTION INTRAVENOUS CONTINUOUS
Status: DISCONTINUED | OUTPATIENT
Start: 2023-09-07 | End: 2023-09-08

## 2023-09-07 RX ORDER — ENOXAPARIN SODIUM 100 MG/ML
30 INJECTION SUBCUTANEOUS EVERY 12 HOURS
Status: DISCONTINUED | OUTPATIENT
Start: 2023-09-08 | End: 2023-09-09

## 2023-09-07 RX ORDER — SUMATRIPTAN 50 MG/1
50 TABLET, FILM COATED ORAL 2 TIMES DAILY PRN
Status: DISCONTINUED | OUTPATIENT
Start: 2023-09-07 | End: 2023-09-10 | Stop reason: HOSPADM

## 2023-09-07 RX ORDER — FENTANYL CITRATE 50 UG/ML
25 INJECTION, SOLUTION INTRAMUSCULAR; INTRAVENOUS
Status: DISCONTINUED | OUTPATIENT
Start: 2023-09-07 | End: 2023-09-07 | Stop reason: HOSPADM

## 2023-09-07 RX ORDER — HALOPERIDOL 5 MG/ML
2 INJECTION INTRAMUSCULAR EVERY 6 HOURS PRN
Status: DISCONTINUED | OUTPATIENT
Start: 2023-09-07 | End: 2023-09-10 | Stop reason: HOSPADM

## 2023-09-07 RX ORDER — DEXTROSE AND SODIUM CHLORIDE 5; .45 G/100ML; G/100ML
50 INJECTION, SOLUTION INTRAVENOUS CONTINUOUS
Status: DISCONTINUED | OUTPATIENT
Start: 2023-09-07 | End: 2023-09-08

## 2023-09-07 RX ORDER — PROPOFOL 10 MG/ML
VIAL (ML) INTRAVENOUS AS NEEDED
Status: DISCONTINUED | OUTPATIENT
Start: 2023-09-07 | End: 2023-09-07 | Stop reason: SURG

## 2023-09-07 RX ORDER — SODIUM CHLORIDE, SODIUM LACTATE, POTASSIUM CHLORIDE, CALCIUM CHLORIDE 600; 310; 30; 20 MG/100ML; MG/100ML; MG/100ML; MG/100ML
1000 INJECTION, SOLUTION INTRAVENOUS CONTINUOUS
Status: DISCONTINUED | OUTPATIENT
Start: 2023-09-07 | End: 2023-09-07

## 2023-09-07 RX ORDER — MORPHINE SULFATE 2 MG/ML
4 INJECTION, SOLUTION INTRAMUSCULAR; INTRAVENOUS
Status: DISCONTINUED | OUTPATIENT
Start: 2023-09-07 | End: 2023-09-08

## 2023-09-07 RX ORDER — NALOXONE HCL 0.4 MG/ML
0.04 VIAL (ML) INJECTION AS NEEDED
Status: DISCONTINUED | OUTPATIENT
Start: 2023-09-07 | End: 2023-09-07 | Stop reason: HOSPADM

## 2023-09-07 RX ADMIN — DEXAMETHASONE SODIUM PHOSPHATE 4 MG: 4 INJECTION, SOLUTION INTRA-ARTICULAR; INTRALESIONAL; INTRAMUSCULAR; INTRAVENOUS; SOFT TISSUE at 13:30

## 2023-09-07 RX ADMIN — SODIUM CHLORIDE 75 ML/HR: 9 INJECTION, SOLUTION INTRAVENOUS at 11:34

## 2023-09-07 RX ADMIN — ASPIRIN 325 MG: 325 TABLET, FILM COATED ORAL at 09:31

## 2023-09-07 RX ADMIN — FENTANYL CITRATE 50 MCG: 50 INJECTION, SOLUTION INTRAMUSCULAR; INTRAVENOUS at 13:30

## 2023-09-07 RX ADMIN — ALBUMIN HUMAN: 0.05 INJECTION, SOLUTION INTRAVENOUS at 13:43

## 2023-09-07 RX ADMIN — BUTALBITAL, ACETAMINOPHEN, AND CAFFEINE 1 TABLET: 50; 325; 40 TABLET ORAL at 21:40

## 2023-09-07 RX ADMIN — POTASSIUM CHLORIDE 40 MEQ: 10 CAPSULE, COATED, EXTENDED RELEASE ORAL at 09:32

## 2023-09-07 RX ADMIN — CARVEDILOL 12.5 MG: 6.25 TABLET, FILM COATED ORAL at 17:17

## 2023-09-07 RX ADMIN — METRONIDAZOLE 500 MG: 500 INJECTION, SOLUTION INTRAVENOUS at 06:30

## 2023-09-07 RX ADMIN — DONEPEZIL HYDROCHLORIDE 10 MG: 10 TABLET, FILM COATED ORAL at 21:45

## 2023-09-07 RX ADMIN — POLYETHYLENE GLYCOL 3350 17 G: 17 POWDER, FOR SOLUTION ORAL at 09:34

## 2023-09-07 RX ADMIN — SODIUM CHLORIDE 2000 MG: 900 INJECTION INTRAVENOUS at 18:57

## 2023-09-07 RX ADMIN — LEVOTHYROXINE SODIUM 50 MCG: 50 TABLET ORAL at 09:28

## 2023-09-07 RX ADMIN — SUGAMMADEX 200 MG: 100 INJECTION, SOLUTION INTRAVENOUS at 14:45

## 2023-09-07 RX ADMIN — Medication 10 ML: at 21:41

## 2023-09-07 RX ADMIN — CEFTRIAXONE SODIUM 1000 MG: 1 INJECTION, POWDER, FOR SOLUTION INTRAMUSCULAR; INTRAVENOUS at 19:46

## 2023-09-07 RX ADMIN — VECURONIUM BROMIDE 1 MG: 1 INJECTION, POWDER, LYOPHILIZED, FOR SOLUTION INTRAVENOUS at 14:22

## 2023-09-07 RX ADMIN — DEXTROSE AND SODIUM CHLORIDE 125 ML/HR: 5; 450 INJECTION, SOLUTION INTRAVENOUS at 16:02

## 2023-09-07 RX ADMIN — LIDOCAINE HYDROCHLORIDE 100 MG: 20 INJECTION, SOLUTION EPIDURAL; INFILTRATION; INTRACAUDAL; PERINEURAL at 13:17

## 2023-09-07 RX ADMIN — VECURONIUM BROMIDE 1 MG: 1 INJECTION, POWDER, LYOPHILIZED, FOR SOLUTION INTRAVENOUS at 13:35

## 2023-09-07 RX ADMIN — METRONIDAZOLE 500 MG: 500 INJECTION, SOLUTION INTRAVENOUS at 17:17

## 2023-09-07 RX ADMIN — OXYCODONE HYDROCHLORIDE 5 MG: 5 TABLET ORAL at 06:35

## 2023-09-07 RX ADMIN — ACETAMINOPHEN 975 MG: 325 TABLET, FILM COATED ORAL at 21:45

## 2023-09-07 RX ADMIN — SENNOSIDES AND DOCUSATE SODIUM 2 TABLET: 50; 8.6 TABLET ORAL at 09:32

## 2023-09-07 RX ADMIN — SUCRALFATE 1 G: 1 TABLET ORAL at 21:41

## 2023-09-07 RX ADMIN — SERTRALINE HYDROCHLORIDE 25 MG: 50 TABLET, FILM COATED ORAL at 09:26

## 2023-09-07 RX ADMIN — PROPOFOL 100 MG: 10 INJECTION, EMULSION INTRAVENOUS at 13:18

## 2023-09-07 RX ADMIN — FENTANYL CITRATE 50 MCG: 50 INJECTION, SOLUTION INTRAMUSCULAR; INTRAVENOUS at 13:45

## 2023-09-07 RX ADMIN — PANTOPRAZOLE SODIUM 40 MG: 40 TABLET, DELAYED RELEASE ORAL at 05:50

## 2023-09-07 RX ADMIN — ROCURONIUM BROMIDE 50 MG: 10 INJECTION INTRAVENOUS at 13:18

## 2023-09-07 RX ADMIN — SUCRALFATE 1 G: 1 TABLET ORAL at 17:16

## 2023-09-07 RX ADMIN — SODIUM CHLORIDE, POTASSIUM CHLORIDE, SODIUM LACTATE AND CALCIUM CHLORIDE 1000 ML: 600; 310; 30; 20 INJECTION, SOLUTION INTRAVENOUS at 13:13

## 2023-09-07 RX ADMIN — SODIUM CHLORIDE 2 G: 900 INJECTION INTRAVENOUS at 13:22

## 2023-09-07 NOTE — OP NOTE
CHOLECYSTECTOMY LAPAROSCOPIC INTRAOPERATIVE CHOLANGIOGRAM  Procedure Note    Jennifer Gomes  9/7/2023    Pre-op Diagnosis:   Acute cholecystitis [K81.0]  Cholecystitis [K81.9]    Post-op Diagnosis:     Post-Op Diagnosis Codes:     * Acute cholecystitis [K81.0]     * Cholecystitis [K81.9]    Procedure/CPT® Codes:      Procedure(s):  CHOLECYSTECTOMY LAPAROSCOPIC INTRAOPERATIVE CHOLANGIOGRAM    Surgeon(s):  Gerardo Arciniega MD        Anesthesia: General    Staff:   Specimens       ID Source Type Tests Collected By Collected At Frozen?    A Gallbladder Tissue TISSUE PATHOLOGY EXAM   Gerardo Arciniega MD 9/7/23 1341 No    Description: GALLBLADDER AND CONTENTS            Estimated Blood Loss: Blood loss 25 cc    Specimens:                ID Type Source Tests Collected by Time   A : GALLBLADDER AND CONTENTS Tissue Gallbladder TISSUE PATHOLOGY EXAM Gerardo Arciniega MD 9/7/2023 1341         Drains:   NG/OG Tube Orogastric 18 Fr Center mouth (Active)       External Urinary Catheter (Active)   Site Assessment Clean;Skin intact 09/07/23 0840   Application/Removal external catheter removed 09/07/23 1207   Collection Container Wall suction 09/07/23 0840   Wall suction (mmHG) 140 mmHG 09/07/23 0840   Securement Method Securing device 09/07/23 0840   Catheter care complete Yes 09/07/23 0840   Output (mL) 550 mL 09/07/23 0256       Indications: Jennifer Gomes is a 82-year-old white female, retired , who has chronic pain syndrome, hypertension, osteoarthritis, and was brought to the emergency room 2 days ago with abdominal pain, and also repeated falls. She had EMS consulted the contacted her for lift assistance they found her covered in stool and urine and came to the emergency room blood work showed elevated CPK, inflammatory changes, elevated BNP and further work-up there is a distended thick-walled gallbladder, elevated liver functions she was also impacted. The impaction was treated there with acute  cholecystitis she is for laparoscopic exploration cholecystectomy and treatment of the above problem. She is aware the procedure the risk and benefits and with full knowledge of this and apparent understanding she gives her informed consent for surgery.     Findings: Laparoscopic exploration, findings of acutely inflamed gallbladder, laparoscopic cholecystectomy completed cholangiogram completed and normal blood loss less than 25 cc.    Complications: No complications blood loss less than 25 cc    Procedure: Patient was placed in a supine position in the surgical suite and after a timeout had been completed  the area was scrubbed prepped and draped with alcohol and Betadine a Ioban was applied.  Following this a transverse skin incision was completed in the upper abdomen incising through the skin and subcutaneous taste tissue to the fascia.  Once of the fascia 2-0 Vicryl stay sutures were placed superior and inferior to the planned transverse incision.  An incision was completed with a 15 blade incising through the fascia and out muscle-splitting technique was completed using Ismael clamps and dissecting in a muscle-splitting technique down to the peritoneum.  The peritoneum was opened and entered a blunt trocar 10 mm port was placed in this area and insufflation with CO2 was completed to maintain the abdominal pressure between 10 and 14 mm of pressure.  This accomplished under direct visualization 3  5 mm trochars were placed one in the right mid abdomen and 2 in the upper abdomen.  With the 3, 5 mm ports and one 10 the gallbladders and grasped with the right lateral port and elevated toward the right shoulder.  The adhesions at the aly hepatis was taken down sharply and as well as with Bovie electrocautery and dissection was completed at the aly hepatis.  With dissection completed the critical angle was visualized with the cystic artery and cystic duct with this visualized the cystic artery was then  clipped ×3 proximally and incised distally further dissection around the infundibulum was completed and a clip was placed across the infundibulum incision into the infundibulum was completed through which a cholangiogram was brought into the field and cholangiography obtained.  There is free flow into the left and right hepatic radical, hepatic duct, bile duct and free flow into the duodenum.  With this completed no other abnormalities noted the Cholangiocath was removed the cystic duct was then transected the infundibulum was controlled using 1-0 PDS loop and the cystic duct was controlled using 2-0 PDS loops and one clip distal to this.  With this completed the gallbladder was slowly and tediously removed from the infrahepatic substance prominent venous and arterial tributaries were noted and a single clip was applied across these.  Ultimately the gallbladder was able to be fully removed.  With this completed the bed was evaluated there is no bleeding from the bed and full control of the cystic duct and cystic artery the gallbladder was then grasped and placed in the Endo Catch  and removed from the right midepigastric port.  There is no spillage of bile or stones and this removal and having completed this the excess gas was relieved the right midepigastric port was closed using 4 figure-of-eight sutures of 0 Vicryl the deep dermis was reapproximated using simple inverted interrupted sutures of 3-0 Vicryl and skin was enclosed using running subcuticular suture of 4-0 Vicryl.  The skin was injected with half percent Marcaine with epinephrine a total of 30 mL used the 5 mm trochars sites were then closed using simple inverted interrupted sutures of 4-0 Vicryl and once completed the area was cleaned with saline Mastisol Steri-Strips 2 x 2 and Tegaderm applied.  Patient was then extubated and transferred to the recovery room in good condition and we supplied back pressure over the right midepigastric port until the  patient was extubated to reduce any increased tension on the larger trocar site.          Gerardo Arciniega MD     Date: 9/7/2023  Time: 14:57 CDT    Part of this note may be an electronic transcription/translation of spoken language to printed text using the Dragon Dictation System.

## 2023-09-07 NOTE — ANESTHESIA PREPROCEDURE EVALUATION
Anesthesia Evaluation     Patient summary reviewed   no history of anesthetic complications:   NPO Solid Status: > 8 hours             Airway   Mallampati: II  Dental      Pulmonary    (+) ,sleep apnea  Cardiovascular   Exercise tolerance: poor (<4 METS)    (+) hypertension, hyperlipidemia  (-) pacemaker, past MI, cardiac stents, CABG      Neuro/Psych- negative ROS  GI/Hepatic/Renal/Endo    (+) morbid obesity, GERD, PUD, renal disease CRI, thyroid problem hypothyroidism    Musculoskeletal     Abdominal    Substance History      OB/GYN          Other   arthritis, blood dyscrasia anemia,   history of cancer                    Anesthesia Plan    ASA 3 - emergent     general     (reports can't even walk to bathroom well --back/leg weakness- no acute or subacute changes)  intravenous induction     Anesthetic plan, risks, benefits, and alternatives have been provided, discussed and informed consent has been obtained with: patient.

## 2023-09-07 NOTE — PLAN OF CARE
Goal Outcome Evaluation:  Plan of Care Reviewed With: patient        Progress: declining   Pt answers oriented to person and appears more confused today.  Medicated x 1 for pain.  Pt screams in pain just before voiding and states pain gone after voiding. Neuro's intact. Pt on 1 L/min per NC while sleeping. Continuous pulse ox in use. Patient turned Q2. External catheter in place. VSS. IVF and antibiotics admin as ordered. safety maintained.

## 2023-09-07 NOTE — ANESTHESIA PROCEDURE NOTES
Airway  Urgency: emergent    Date/Time: 9/7/2023 1:20 PM  Airway not difficult    General Information and Staff    Patient location during procedure: OR  CRNA/CAA: Haris Slater CRNA  SRNA: Rose Wise SRNA  Consent for Airway (if performed for an anesthetic, see related documentation for consents)  Patient identity confirmed: verbally with patient  Consent: The procedure was performed in an emergent situation. Verbal consent obtained. Written consent not obtained.  Risks and benefits: risks, benefits and alternatives were not discussed  Consent given by: patient      Indications and Patient Condition  Indications for airway management: airway protection    Preoxygenated: yes  Mask difficulty assessment: 0 - not attempted    Final Airway Details  Final airway type: endotracheal airway      Successful airway: ETT  Cuffed: yes   Successful intubation technique: direct laryngoscopy  Facilitating devices/methods: intubating stylet and cricoid pressure  Blade: Dulce  Blade size: 3.5  ETT size (mm): 7.0  Cormack-Lehane Classification: grade IIa - partial view of glottis  Placement verified by: chest auscultation and capnometry   Cuff volume (mL): 6  Measured from: teeth  ETT/EBT  to teeth (cm): 22  Number of attempts at approach: 1  Assessment: lips, teeth, and gum same as pre-op and atraumatic intubation

## 2023-09-07 NOTE — PROGRESS NOTES
Ms. Liang was called at her home which is the same home the patient I tried to reach Ms. Gomes's  [place] but not available left a message with her daughter Ms. Liang they are aware of procedure that is planned and surgery and plan for surgical intervention this afternoon.

## 2023-09-07 NOTE — PROGRESS NOTES
"    [facility] Medicine Services  INPATIENT PROGRESS NOTE    Patient Name: Jennifer Gomes  Date of Admission: 9/4/2023  Today's Date: 09/07/23  Length of Stay: 3  Primary Care Physician: Olivia Mora APRN    Subjective   Chief Complaint: Abdominal pain  HPI   Ms. Gomes presented to [facility] emergency room 9/4/2023 with back pain.  EMS had been contacted after patient found down at home.  Patient was covered in stool and urine.  Patient reported she had been on the ground for \"a couple of days\".  She lives with her  and daughter and it is unknown how long patient had been on the floor.  WBC 20.6, potassium 3.1, creatinine 1.66, CK6 176, lactate 2.1, lipase 7, urinalysis too numerous to count WBC, 4+ bacteria, positive nitrate. Chest x-ray negative, CT abdomen pelvis shows large volume stool in rectum and distal colon with differential of acute colitis versus stercoral colitis. CT lumbar spine negative for fracture CT T-spine negative for fracture CT head negative for acute process, CT C-spine negative for fracture, x-ray left shoulder negative for fracture.  Lactated Ringer's fluid bolus, Rocephin given in ER.    Today  Lying in bed.  No oxygen in use.  No visitors in room.  Patient reports right upper quadrant tenderness upon examination.  She denies nausea.  No palpitations or chest pain.  She tells me she uses a walker at home.  She correctly tells me her address and that she lives with her  Aadms and daughter Lola.  LFTs significantly elevated yesterday with elevated bilirubin.  Ultrasound gallbladder notes mildly distended gallbladder with gallbladder wall thickening for early or acute cholecystitis.  No gallstones.  LFTs some improved today.  General surgery consulted and Dr. Arciniega plans for surgery today.    Review of Systems   Constitutional:  Positive for fatigue. Negative for chills and fever.   HENT:  Negative for congestion and trouble " swallowing.    Eyes:  Negative for photophobia and visual disturbance.   Respiratory:  Negative for cough, shortness of breath and wheezing.    Cardiovascular:  Negative for chest pain, palpitations and leg swelling.   Gastrointestinal:  Positive for abdominal pain (Right upper quadrant, upper abdominal). Negative for vomiting.   Endocrine: Negative for cold intolerance, heat intolerance and polyuria.   Genitourinary:  Positive for urgency.   Musculoskeletal:  Positive for gait problem.   Skin:  Negative for color change, pallor, rash and wound.   Allergic/Immunologic: Negative for immunocompromised state.   Neurological:  Positive for weakness. Negative for light-headedness.   Hematological:  Negative for adenopathy. Does not bruise/bleed easily.   Psychiatric/Behavioral:  Negative for agitation, behavioral problems and confusion.       All pertinent negatives and positives are as above. All other systems have been reviewed and are negative unless otherwise stated.     Objective    Temp:  [97.7 °F (36.5 °C)-98.6 °F (37 °C)] 98.2 °F (36.8 °C)  Heart Rate:  [68-78] 72  Resp:  [16-18] 16  BP: (122-140)/(41-77) 131/54  Physical Exam  Vitals and nursing note reviewed.   Constitutional:       Comments: Lying in bed.  No oxygen in use.  No visitors in room.  Patient answers questions appropriately follows commands.   HENT:      Head: Normocephalic and atraumatic.      Nose: No congestion.      Mouth/Throat:      Pharynx: Oropharynx is clear. No oropharyngeal exudate or posterior oropharyngeal erythema.   Eyes:      Extraocular Movements: Extraocular movements intact.      Pupils: Pupils are equal, round, and reactive to light.   Cardiovascular:      Rate and Rhythm: Normal rate and regular rhythm.      Heart sounds: No murmur heard.     Comments: Sinus rhythm 79 on telemetry.  Pulmonary:      Breath sounds: No wheezing, rhonchi or rales.      Comments: No oxygen in use.  Abdominal:      Tenderness: There is abdominal  tenderness (Upper abdomen, right upper quadrant).   Genitourinary:     Comments: Voiding  Musculoskeletal:         General: No swelling or tenderness.      Cervical back: Normal range of motion and neck supple.   Skin:     General: Skin is warm and dry.   Neurological:      General: No focal deficit present.      Mental Status: She is oriented to person, place, and time.   Psychiatric:         Mood and Affect: Mood normal.         Behavior: Behavior normal.     Results Review:  I have reviewed the labs, radiology results, and diagnostic studies.    Laboratory Data:   Results from last 7 days   Lab Units 09/07/23  0506 09/06/23  0353 09/05/23  0539   WBC 10*3/mm3 8.39 10.40 13.50*   HEMOGLOBIN g/dL 8.7* 7.4* 8.8*   HEMATOCRIT % 30.5* 24.2* 28.0*   PLATELETS 10*3/mm3 148 151 150     Results from last 7 days   Lab Units 09/07/23  0506 09/06/23  1307 09/06/23  0353   SODIUM mmol/L 140 142 142   POTASSIUM mmol/L 3.4* 3.4* 3.5   CHLORIDE mmol/L 112* 113* 114*   CO2 mmol/L 16.0* 19.0* 17.0*   BUN mg/dL 25* 31* 33*   CREATININE mg/dL 0.73 0.82 0.82   CALCIUM mg/dL 9.1 8.7 8.9   BILIRUBIN mg/dL 0.6 1.3* 1.2   ALK PHOS U/L 398* 413* 350*   ALT (SGPT) U/L 434* 501* 526*   AST (SGOT) U/L 531* 833* 997*   GLUCOSE mg/dL 120* 143* 118*       Culture Data:   Blood Culture   Date Value Ref Range Status   09/04/2023 No growth at 2 days  Preliminary   09/04/2023 No growth at 2 days  Preliminary     Urine Culture   Date Value Ref Range Status   09/04/2023 >100,000 CFU/mL Escherichia coli (A)  Final     Escherichia coli       CHULA     Ampicillin >=32 Resistant     Ampicillin + Sulbactam >=32 Resistant     Cefazolin <=4 Susceptible     Cefepime <=1 Susceptible     Ceftazidime <=1 Susceptible     Ceftriaxone <=1 Susceptible     Gentamicin <=1 Susceptible     Levofloxacin <=0.12 Susceptible     Nitrofurantoin <=16 Susceptible     Piperacillin + Tazobactam <=4 Susceptible     Trimethoprim + Sulfamethoxazole <=20 Susceptible      Imaging  Results (All)       Procedure Component Value Units Date/Time    US Abdomen Limited [[id number]] Collected: 09/06/23 1113     Updated: 09/06/23 1120    Narrative:      US ABDOMEN LIMITED- 9/6/2023 9:24 AM CDT     REASON FOR EXAM: transaminitis; N30.00-Acute cystitis without hematuria;  M62.82-Rhabdomyolysis; N17.9-Acute kidney failure, unspecified;  I50.9-Heart failure, unspecified; R62.7-Adult failure to thrive;  W19.XXXA-Unspecified fall, initial encounter; R13.10-Dysphagia,  unspecified       COMPARISON: CT abdomen pelvis 9/4/2023.      TECHNIQUE: Multiple longitudinal and transverse realtime sonographic  images of the right upper quadrant of the abdomen are obtained.      FINDINGS:    Pancreas: Normal in size and echogenicity.      Liver: Normal in size, echogenicity and echotexture. No focal lesion.      Gallbladder: The gallbladder is mildly distended. No visualized stones.  Borderline gallbladder wall thickening measuring up to 3 mm. No  pericholecystic fluid..      Bile ducts: The CBD measures 0.7 cm in diameter. There is no  intrahepatic or extrahepatic ductal dilation.     Right kidney: Measures 9.2 cm in length. Normal in sonographic  appearance.     Other: No ascites.        Impression:         Mildly distended gallbladder with borderline gallbladder wall  thickening. Recommend correlation for early acute or chronic  cholecystitis. No visualized gallstones. No pericholecystic fluid. This  could be further evaluated with HIDA scan if clinically indicated.  This report was finalized on 09/06/2023 11:17 by  Sunny Degroot DO.    XR Shoulder 2+ View Left [[id number]] Collected: 09/04/23 1952     Updated: 09/04/23 1957    Narrative:      EXAM/TECHNIQUE: XR SHOULDER 2+ VW LEFT-     INDICATION: left shoulder pain     COMPARISON: None     FINDINGS:     Glenohumeral and acromioclavicular joints are maintained. No acute  fracture or dislocation. Mild AC joint osteophyte development. No  suspicious bone lesion.  Included portion of the LEFT chest appears  unremarkable. No acute soft tissue finding.       Impression:         No acute osseous findings.  This report was finalized on 09/04/2023 19:54 by Dr. Bakari Ramos MD.    CT Thoracic Spine Without Contrast [[id number]] Collected: 09/04/23 1859     Updated: 09/04/23 1905    Narrative:      EXAM/TECHNIQUE: CT thoracic spine without contrast     INDICATION: Spine fracture, thoracic, traumatic     COMPARISON: None     DLP: 963 mGy cm. Automated exposure control was also utilized to  decrease patient radiation dose.     FINDINGS:     Mild thoracic spine levocurvature, potentially related to patient  positioning. Thoracic kyphosis is maintained. No subluxations. Vertebral  body heights are maintained. No acute fracture. Mild multilevel thoracic  spine degenerative change. Included portion of the posterior lungs are  clear. No pneumothorax. Paravertebral soft tissues are unremarkable.  Esophagus is mildly patulous.       Impression:         1.  No acute fracture or subluxation.  2.  Mild multilevel cervical spine degenerative change.  This report was finalized on 09/04/2023 19:01 by Dr. Bakari Ramos MD.    CT Lumbar Spine Without Contrast [[id number]] Collected: 09/04/23 1852     Updated: 09/04/23 1901    Narrative:      EXAM/TECHNIQUE:  1. CT abdomen pelvis without contrast  2. CT lumbar spine without contrast     INDICATION: Fall, abdominal pain, back pain     COMPARISON: 02/08/2022     DLP: 252 mGy cm. Automated exposure control was also utilized to  decrease patient radiation dose.     FINDINGS:     CT abdomen pelvis without contrast:     Mild atelectasis in the included lung bases. The unenhanced liver,  gallbladder, adrenal glands, and pancreas are unremarkable. Gallbladder  is distended without evidence of wall thickening or gallstones. No large  renal lesion. No urolithiasis or hydronephrosis. No focal urinary  bladder abnormality.     Large volume stool in the rectum  and distal colon. Long segment wall  thickening of the sigmoid colon and distal descending colon with  surrounding fat stranding. A few scattered colonic diverticula are also  present within this region. The ascending and transverse colon appear  unremarkable. No secondary signs of appendicitis. No small bowel  distention or evidence of active small bowel inflammation.     No ascites or free pelvic fluid. No pelvic mass or pelvic collection.  Prior hysterectomy.     Normal caliber abdominal aorta with atherosclerotic calcification. No  enlarged abdominal or pelvic lymph nodes. No acute pelvic or hip  fracture. Sacrum appears intact. SI joints and pubic symphysis are  intact.     CT lumbar spine without contrast:     5 lumbar type vertebral bodies. Grade 1 anterolisthesis of L4 on L5,  likely degenerative in nature. Lumbar vertebral body heights are  maintained. No acute fracture. Moderate multilevel lumbar spine  degenerative change with multilevel central canal or neural foraminal  stenosis.       Impression:         1.  No acute traumatic finding in the abdomen or pelvis.     2.  No acute lumbar spine fracture.     3.  Large volume stool in the rectum and distal colon. Wall thickening  and inflammation of the sigmoid colon and distal descending colon,  compatible with acute colitis. This may be related to stercoral colitis  although differential also includes infectious colitis and acute  diverticulitis given the presence of small diverticula.     4.  Multilevel lumbar spine degenerative change and grade 1  anterolisthesis of L4 on L5 which is likely degenerative.     5.  Distended gallbladder without evidence of acute cholecystitis.  This report was finalized on 09/04/2023 18:58 by Dr. Bakari Ramos MD.    CT Abdomen Pelvis Without Contrast [[id number]] Collected: 09/04/23 1852     Updated: 09/04/23 1901    Narrative:      EXAM/TECHNIQUE:  1. CT abdomen pelvis without contrast  2. CT lumbar spine without  contrast     INDICATION: Fall, abdominal pain, back pain     COMPARISON: 02/08/2022     DLP: 252 mGy cm. Automated exposure control was also utilized to  decrease patient radiation dose.     FINDINGS:     CT abdomen pelvis without contrast:     Mild atelectasis in the included lung bases. The unenhanced liver,  gallbladder, adrenal glands, and pancreas are unremarkable. Gallbladder  is distended without evidence of wall thickening or gallstones. No large  renal lesion. No urolithiasis or hydronephrosis. No focal urinary  bladder abnormality.     Large volume stool in the rectum and distal colon. Long segment wall  thickening of the sigmoid colon and distal descending colon with  surrounding fat stranding. A few scattered colonic diverticula are also  present within this region. The ascending and transverse colon appear  unremarkable. No secondary signs of appendicitis. No small bowel  distention or evidence of active small bowel inflammation.     No ascites or free pelvic fluid. No pelvic mass or pelvic collection.  Prior hysterectomy.     Normal caliber abdominal aorta with atherosclerotic calcification. No  enlarged abdominal or pelvic lymph nodes. No acute pelvic or hip  fracture. Sacrum appears intact. SI joints and pubic symphysis are  intact.     CT lumbar spine without contrast:     5 lumbar type vertebral bodies. Grade 1 anterolisthesis of L4 on L5,  likely degenerative in nature. Lumbar vertebral body heights are  maintained. No acute fracture. Moderate multilevel lumbar spine  degenerative change with multilevel central canal or neural foraminal  stenosis.       Impression:         1.  No acute traumatic finding in the abdomen or pelvis.     2.  No acute lumbar spine fracture.     3.  Large volume stool in the rectum and distal colon. Wall thickening  and inflammation of the sigmoid colon and distal descending colon,  compatible with acute colitis. This may be related to stercoral colitis  although  differential also includes infectious colitis and acute  diverticulitis given the presence of small diverticula.     4.  Multilevel lumbar spine degenerative change and grade 1  anterolisthesis of L4 on L5 which is likely degenerative.     5.  Distended gallbladder without evidence of acute cholecystitis.  This report was finalized on 09/04/2023 18:58 by Dr. Bakari Ramos MD.    CT Cervical Spine Without Contrast [[id number]] Collected: 09/04/23 1844     Updated: 09/04/23 1852    Narrative:      EXAM/TECHNIQUE: CT cervical spine without contrast     INDICATION: Neck trauma (Age >= 65y)     COMPARISON: None     DLP: 367 mGy cm. Automated exposure control was also utilized to  decrease patient radiation dose.     FINDINGS:     Craniocervical relationships are maintained. No evidence of acute  odontoid process fracture. Corticated ossific fragment along the tip of  the odontoid may be related to old injury. Trace anterolisthesis of C4  on C5. Cervical spine alignment is otherwise maintained. Vertebral body  heights are maintained. No acute fracture. Facet joints are anatomically  aligned. Moderate multilevel cervical spine degenerative change with  multilevel neural foraminal stenosis, most pronounced at C5-C6.  Paravertebral soft tissues are unremarkable. Carotid artery  atherosclerotic calcifications are noted.       Impression:         1.  No acute osseous findings.  2.  Moderate multilevel cervical spine degenerative change, greatest at  C5-C6.  3.  Trace anterolisthesis of C4 on C5, likely degenerative in nature.  This report was finalized on 09/04/2023 18:49 by Dr. Bakari Ramos MD.    CT Head Without Contrast [[id number]] Collected: 09/04/23 1844     Updated: 09/04/23 1847    Narrative:      EXAM/TECHNIQUE: CT head without contrast     INDICATION: Head trauma, moderate-severe     COMPARISON: 02/08/2022     DLP: 697 mGy cm. Automated exposure control was also utilized to  decrease patient radiation dose.      FINDINGS:     No evidence of intracranial hemorrhage. Gray-white differentiation is  maintained. Chronic microvascular ischemic white matter change and  global cerebral volume loss. Mild ex vacuo ventricular dilatation. No  midline shift or mass effect. Basilar cisterns are patent. No acute  orbital finding. Mastoid air cells are clear. Chronic RIGHT maxillary  sinus mucosal thickening and mucosal retention cyst. Motion artifact  limits evaluation of the skull base. No acute osseous finding.       Impression:         1.  No acute intracranial findings.  2.  Global cerebral volume loss and presumed chronic microvascular  ischemic white matter change.  This report was finalized on 09/04/2023 18:44 by Dr. Bakari Ramos MD.    XR Chest 1 View [[id number]] Collected: 09/04/23 1705     Updated: 09/04/23 1710    Narrative:      EXAM/TECHNIQUE: XR CHEST 1 VW-     INDICATION: Altered mental status     COMPARISON: 03/14/2023     FINDINGS:     Cardiac silhouette is normal in size. No pleural effusion, pneumothorax,  or focal consolidation. No acute osseous finding.       Impression:         No acute findings.  This report was finalized on 09/04/2023 17:07 by Dr. Bakari Ramos MD.           Scheduled medications  [MAR Hold] aspirin, 325 mg, Oral, Daily  [MAR Hold] bisacodyl, 10 mg, Rectal, Daily  carvedilol, 12.5 mg, Oral, BID With Meals  ceFOXitin, 2,000 mg, Intravenous, Q6H  cefTRIAXone, 1,000 mg, Intravenous, Q24H  [MAR Hold] donepezil, 10 mg, Oral, Nightly  [MAR Hold] lactated ringers, 1,000 mL, Intravenous, Once  [MAR Hold] levothyroxine, 50 mcg, Oral, QAM  metroNIDAZOLE, 500 mg, Intravenous, Q8H  [MAR Hold] pantoprazole, 40 mg, Oral, Q AM  [MAR Hold] polyethylene glycol, 17 g, Oral, Daily  [MAR Hold] senna-docusate sodium, 2 tablet, Oral, BID  [MAR Hold] sertraline, 25 mg, Oral, Daily  [MAR Hold] sodium chloride, 10 mL, Intravenous, Q12H  [MAR Hold] venlafaxine, 25 mg, Oral, Daily      I have reviewed the patient's  current medications.     Assessment/Plan   Assessment  Active Hospital Problems    Diagnosis     **E. coli UTI     Acute cholecystitis     TOMÁS (acute kidney injury)     Moderate malnutrition     Transaminitis     Colitis     Chronic pain syndrome     Chronic prescription opiate use     Hypothyroidism (acquired)     Essential hypertension     Non-traumatic rhabdomyolysis     Spinal stenosis, lumbar region, without neurogenic claudication     Gastroesophageal reflux disease      Treatment Plan  1.  Acute E. coli UTI, present on admission.  Urinalysis 13-20 WBC, 3+ bacteria.  Rocephin started on admission.  Urine culture positive for E. coli sensitive to Rocephin.  Continue Rocephin.  Blood cultures no growth x 2 days.  WBC 20.6 on admission down to 8.39 today.    2.  Acute kidney injury.  Creatinine 1.66 on admission with baseline creatinine 0.81 on 3/14/2023.  Hydrated with IV fluids.  Creatinine improved 0.73.  Repeat CMP in AM.    3.  Nontraumatic rhabdomyolysis.  , 528, 150, 49.    4.  Acute cholecystitis with transaminitis.  AST and ALT normal on admission.  ,  on 9/6/2023.  Bilirubin 1.3 on 9/6.  Ultrasound gallbladder 9/6/2023 reported mildly distended gallbladder with borderline gallbladder wall thickening.  Recommend correlation for early acute on chronic cholecystitis.  No gallstones.  ,  bilirubin 0.6 today.  General surgery consulted and Dr. Arciniega plans for laparoscopic cholecystectomy today.  Repeat CMP in AM.  Avoid hepatotoxins.  Statin, Xanax, Tylenol discontinued on 9/6.  Hepatitis panel negative.    5.  Stercoral colitis.  Flagyl started 9/5 and continues.  Bowel regimen.  Leukocytosis resolved.    6.  Normocytic anemia.  Hemoglobin 9.6 on admission trended down to 7.4.  Suspect dilutional effect from IV fluids.  Hemoglobin 8.7 today.  No signs of bright red bleeding per rectum or dark tarry stools.    7.  Chronic pain syndrome with chronic prescription opioid  use.  Dilaudid, OxyContin    8.  Primary hypertension.  Blood pressure 131/54.  Continue coreg     9.  Hypokalemia.  Potassium 2.9 on 9/5.  Potassium replaced.  Potassium 3.4 today.  Replace potassium 40 mEq x 1 dose.  Repeat CMP in AM.    10.  Spinal stenosis, lumbar region without neurogenic claudication.  Consult physical therapy.    11.  GERD.  Continue pantoprazole daily.    12.  Hypothyroidism.  Continue Synthroid.    2.  SCDs for deep vein thrombosis prophylaxis.  Lovenox on hold for surgery and elevated LFTs      Medical Decision Making  Number and Complexity of problems: 12  Acute E. coli UTI: Acute, high complexity, stable  Acute kidney injury: Acute, high complexity, stable, improving  Nontraumatic rhabdomyolysis, acute, high complexity, stable  Acute cholecystitis with transaminitis: Acute, high complexity posing threat to life and bodily function requiring general surgery consultation and surgical intervention  Stercoral colitis: Acute, moderate complexity, stable  Normocytic anemia: Acute on chronic, moderate complexity stable  Primary hypertension: Chronic, moderate complexity, stable  Chronic pain syndrome: Chronic, moderate complexity, stable  Hypokalemia: Acute, high complexity, improving  Spinal stenosis: Chronic, moderate complexity, stable  GERD: Chronic, low complexity, stable  Hypothyroidism: Chronic, low complexity, stable    Differential Diagnosis: None    Conditions and Status   Patient is unchanged     MDM Data  External documents reviewed: Gastroenterology office visit 8/24/2023  Cardiac tracing (EKG, telemetry) interpretation: Normal sinus rhythm 79 on telemetry  Radiology interpretation: Reviewed radiology interpretation ultrasound gallbladder on 9/7/2023.  Labs reviewed:   CMP 9/7/2023.  Repeat CMP in AM.  CBC 9/7/2023.  Repeat CBC in AM.  Blood cultures no growth at 2 days  Urine culture E. coli    Any tests that were considered but not ordered: None     Decision rules/scores  evaluated (example [id number], Wells, etc): None     Discussed with: Dr. Kent and patient.      Care Planning  Shared decision making: Dr. Kent and patient.  Patient agrees to general surgery consultation and surgery as recommended by Dr. Arciniega today.  Code status and discussions: Full code  Patient surrogate decision maker is her , Adams and daughter Lola.    Disposition  Social Determinants of Health that impact treatment or disposition: None  I expect the patient to be discharged to home with home health in 2-3 days.     Electronically signed by MORGAN Ramirez, 09/07/23, 13:22 CDT.

## 2023-09-07 NOTE — CONSULTS
Patient Care Team:  Olivia Mora APRN as PCP - General (Nurse Practitioner)  [facility]  Eleazar Ramires MD as Cardiologist (Cardiology)  Shasta Madrigal MD as Referring Physician (Obstetrics and Gynecology)  Holly Chavez MD as Consulting Physician (General Surgery)    Chief complaint upper abdominal pain, acute cholecystitis    Subjective     Subjective .     History of present illness: Patient is a 82-year-old white female, retired , who has chronic pain syndrome, hypertension, osteoarthritis, and was brought to the emergency room 2 days ago with abdominal pain, and also repeated falls.  She had EMS consulted the contacted her for lift assistance they found her covered in stool and urine and came to the emergency room blood work showed elevated CPK, inflammatory changes, elevated BNP and further work-up there is a distended thick-walled gallbladder, elevated liver functions she was also impacted.  The impaction was treated there with acute cholecystitis she is for laparoscopic exploration cholecystectomy and treatment of the above problem.  She is aware the procedure the risk and benefits and with full knowledge of this and apparent understanding she gives her informed consent for surgery.    Surgical history is significant for bladder repair, right breast biopsy, cardiac catheterization, carpal tunnel release multiple colonoscopies, multiple upper endoscopies, vaginal mesh placement, hysterectomy.    Medical problems significant for anxiety, arthritis, bronchitis, uterine cancer, chronic pain, depression, fibromyalgia, headache, hyperlipidemia hypertension incontinence insomnia lumbar stenosis migraines peptic ulcer disease restless leg sleep apnea urinary tract infection and vaginal bleeding.    Non-smoker nondrinker.  She lives with her family at home.  Review of Systems  Pertinent items are noted in HPI, all other systems reviewed and  negative    History  Past Medical History:   Diagnosis Date    Anxiety     Arthritis     Bronchitis     Cancer     uterine    Chronic pain     Depression     Disease of thyroid gland     Fibromyalgia     GERD (gastroesophageal reflux disease)     Headache     History of transfusion     AS     Hyperlipidemia     Hypertension     Incontinence     Insomnia     Leg pain     Lumbar stenosis     Migraines     Peptic ulcer     Restless legs     Sleep apnea     NO C-PAP    UTI (urinary tract infection)     Vaginal bleeding    ,   Past Surgical History:   Procedure Laterality Date    BLADDER REPAIR      MESH HAD TO BE REMOVED IN 2013    BREAST BIOPSY Right 2017    benign    BREAST CYST EXCISION Left     CARDIAC CATHETERIZATION      CARPAL TUNNEL RELEASE      CATARACT EXTRACTION W/ INTRAOCULAR LENS  IMPLANT, BILATERAL      COLONOSCOPY      COLONOSCOPY N/A 10/01/2021    Procedure: COLONOSCOPY WITH ANESTHESIA;  Surgeon: Tom Velasco DO;  Location: [facility] ENDOSCOPY;  Service: Gastroenterology;  Laterality: N/A;  pre: change in bowel habits  post: diverticulosis. hemorrhoids.   Olivia Mora APRN        CYSTECTOMY      D & C HYSTEROSCOPY N/A 2017    Procedure: DILATATION AND CURETTAGE HYSTEROSCOPY;  Surgeon: Shasta Madrigal MD;  Location: [facility] OR;  Service:     DILATION AND CURETTAGE, DIAGNOSTIC / THERAPEUTIC      ENDOSCOPY  2010    Short segment of Arriola's,Moderate chroninc esophagogastritis and negative H.pylori    ENDOSCOPY N/A 2017    Procedure: ESOPHAGOGASTRODUODENOSCOPY WITH ANESTHESIA;  Surgeon: Tom Velasco DO;  Location:  [facility] ENDOSCOPY;  Service:     EYE SURGERY      RETINA    HEMORRHOIDECTOMY SIGMOIDOSCOPY N/A 3/21/2023    Procedure: HEMORRHOIDECTOMY WITH EXAM UNDER ANESTHESIA;  Surgeon: Holly Chavez MD;  Location: [facility] OR;  Service: General;  Laterality: N/A;    HYSTERECTOMY  2017    ORIF TIBIA/FIBULA FRACTURES Left     TRANSVAGINAL TAPING  SUSPENSION N/A 11/06/2017    Procedure: VAGINAL MESH REVISION;  Surgeon: Shasta Madrigal MD;  Location:  [facility] OR;  Service:     VAGINAL MESH REVISION  2013   ,   Family History   Problem Relation Age of Onset    Diabetes Mother     Multiple myeloma Mother     Stroke Father     Diabetes Sister     Prostate cancer Brother     Lymphoma Brother         NHL    Ovarian cancer Paternal Aunt     Cancer Paternal Grandmother         metastatic    Lung cancer Paternal Grandfather     Colon cancer Neg Hx     Esophageal cancer Neg Hx     Breast cancer Neg Hx    ,   Social History     Tobacco Use    Smoking status: Never    Smokeless tobacco: Never   Vaping Use    Vaping Use: Never used   Substance Use Topics    Alcohol use: Not Currently     Comment: occasional    Drug use: No   ,   Medications Prior to Admission   Medication Sig Dispense Refill Last Dose    acetaminophen (Tylenol) 325 MG tablet Take 3 tablets by mouth Every 8 (Eight) Hours. Take every 8 hours for 3 days then take prn as needed. 100 tablet 2 Past Week    aspirin 325 MG tablet Take 1 tablet by mouth Daily.   Past Week    atorvastatin (LIPITOR) 40 MG tablet Take 1 tablet by mouth Daily.   Past Week    butalbital-acetaminophen-caffeine (FIORICET, ESGIC) -40 MG per tablet Take 1 tablet by mouth 2 (Two) Times a Day.   Past Week    carvedilol (COREG) 12.5 MG tablet Take 1 tablet by mouth 2 (Two) Times a Day With Meals.   Past Week    cyclobenzaprine (FLEXERIL) 10 MG tablet Take 1 tablet by mouth 3 (Three) Times a Day As Needed for Muscle Spasms.   Past Week    donepezil (ARICEPT) 10 MG tablet Take 1 tablet by mouth Every Night.   Past Week    ergocalciferol (ERGOCALCIFEROL) 31862 units capsule Take 1 capsule by mouth 1 (One) Time Per Week. Saturday   Past Week    ibuprofen (Motrin IB) 200 MG tablet Take 3 tablets by mouth Every 8 (Eight) Hours. Take every 8 hours for three days then take as needed. 100 tablet 2 Past Week    ipratropium (ATROVENT) 0.06 % nasal  spray 2 sprays into the nostril(s) as directed by provider 2 (Two) Times a Day.   Past Week    LACTASE ENZYME PO Take 3 tablets by mouth As Needed (takes before dairy products).   Past Month    lansoprazole (PREVACID) 30 MG capsule Take 1 capsule by mouth Every Morning.   Past Week    levothyroxine (SYNTHROID, LEVOTHROID) 50 MCG tablet Take 1 tablet by mouth Every Morning.   Past Week    nystatin (MYCOSTATIN) 566362 UNIT/GM cream Apply 1 application  topically to the appropriate area as directed 2 (Two) Times a Day.   Past Week    oxyCODONE (ROXICODONE) 15 MG immediate release tablet Take 1 tablet by mouth 4 (Four) Times a Day.   Past Week    sertraline (ZOLOFT) 25 MG tablet Take 1 tablet by mouth Daily. 90 tablet 3 Past Week    solifenacin (VESICARE) 10 MG tablet Take 1 tablet by mouth Daily. 30 tablet 11 Past Week    SUMAtriptan (IMITREX) 50 MG tablet Take 1 tablet by mouth 2 (Two) Times a Day As Needed for Migraine. 30 tablet 3 Past Week    venlafaxine (EFFEXOR) 25 MG tablet Take 1 tablet by mouth Daily. 90 tablet 3 Past Week    polyethylene glycol (MiraLax) 17 g packet Take 17 g by mouth Daily As Needed (Constipation).      , Scheduled Meds:  aspirin, 325 mg, Oral, Daily  bisacodyl, 10 mg, Rectal, Daily  carvedilol, 12.5 mg, Oral, BID With Meals  ceFOXitin, 2,000 mg, Intravenous, Q6H  cefTRIAXone, 1,000 mg, Intravenous, Q24H  donepezil, 10 mg, Oral, Nightly  lactated ringers, 1,000 mL, Intravenous, Once  levothyroxine, 50 mcg, Oral, QAM  metroNIDAZOLE, 500 mg, Intravenous, Q8H  pantoprazole, 40 mg, Oral, Q AM  polyethylene glycol, 17 g, Oral, Daily  senna-docusate sodium, 2 tablet, Oral, BID  sertraline, 25 mg, Oral, Daily  sodium chloride, 10 mL, Intravenous, Q12H  venlafaxine, 25 mg, Oral, Daily    , Continuous Infusions:  sodium chloride, 75 mL/hr, Last Rate: 75 mL/hr (09/07/23 1134)  sodium chloride, 100 mL/hr    , PRN Meds:    [DISCONTINUED] senna-docusate sodium **AND** [DISCONTINUED] polyethylene glycol  **AND** bisacodyl **AND** [DISCONTINUED] bisacodyl    nitroglycerin    ondansetron    ondansetron    oxyCODONE    sodium chloride    sodium chloride and Allergies:  Ropinirole hcl, Codeine, Definity [perflutren lipid microsphere], Ambien [zolpidem], Eszopiclone, Pregabalin, and Tizanidine    Objective      Objective     Vital Signs   Temp:  [97.7 °F (36.5 °C)-98.6 °F (37 °C)] 98.2 °F (36.8 °C)  Heart Rate:  [68-78] 70  Resp:  [16-18] 16  BP: (122-140)/(41-77) 131/54    Intake & Output (last 3 days)         09/04 0701 09/05 0700 09/05 0701 09/06 0700 09/06 0701  09/07 0700 09/07 0701  09/08 0700    P.O.  40      I.V. (mL/kg)   1000 (13.2) 1265 (16.8)    IV Piggyback 1000 200 300     Total Intake(mL/kg) 1000 (13.2) 240 (3.2) 1300 (17.2) 1265 (16.8)    Urine (mL/kg/hr) 300 900 (0.5) 1150 (0.6)     Stool   0     Total Output      Net +700 -660 +150 +1265            Urine Unmeasured Occurrence 2 x 1 x 1 x     Stool Unmeasured Occurrence   1 x              Physical Exam:     General Appearance:    Alert, cooperative, in no acute distress fully oriented, complains of pain in the central abdomen especially the right upper quadrant.   Head:    Normocephalic, without obvious abnormality, atraumatic   Eyes:            Lids and lashes normal, conjunctivae and sclerae normal, no   icterus, no pallor, corneas clear, PERRLA   Ears:    Ears appear intact with no abnormalities noted   Throat:   No oral lesions, no thrush, oral mucosa moist   Neck:   No adenopathy, supple, trachea midline, no thyromegaly, no   carotid bruit, no JVD   Back:     No kyphosis present, no scoliosis present, no skin lesions,      erythema or scars, no tenderness to percussion or                   palpation,   range of motion normal   Lungs:     Clear to auscultation,respirations regular, even and                  unlabored    Heart:    Regular rhythm and normal rate, normal S1 and S2, no            murmur, no gallop, no rub, no click   Chest  Wall:    No abnormalities observed   Abdomen:   Flat abdomen, occasional bowel sounds, tenderness in the right upper abdomen, no palpable gallbladder but tenderness in the right upper abdomen.  No obvious hernia noted.   Rectal:   Normal sphincter tone, large stool ball present, this disimpaction was completed, also fleets was completed, white count initially was 13,000 is on the way down, but liver functions are still elevated with an alkaline phos of 398, GPT of 434, G OT of 500.   Extremities:   Moves all extremities well, no edema, no cyanosis, no             redness   Pulses:   Pulses palpable and equal bilaterally   Skin:   No bleeding, bruising or rash   Lymph nodes:   No palpable adenopathy   Neurologic:   Cranial nerves 2 - 12 grossly intact, sensation intact, DTR       present and equal bilaterally        Results from last 7 days   Lab Units 09/07/23  0506 09/06/23  0353 09/05/23  0539   WBC 10*3/mm3 8.39 10.40 13.50*   HEMOGLOBIN g/dL 8.7* 7.4* 8.8*   HEMATOCRIT % 30.5* 24.2* 28.0*   PLATELETS 10*3/mm3 148 151 150        Results from last 7 days   Lab Units 09/07/23  0506 09/06/23  1307 09/06/23  0353   SODIUM mmol/L 140 142 142   POTASSIUM mmol/L 3.4* 3.4* 3.5   CHLORIDE mmol/L 112* 113* 114*   CO2 mmol/L 16.0* 19.0* 17.0*   BUN mg/dL 25* 31* 33*   CREATININE mg/dL 0.73 0.82 0.82   CALCIUM mg/dL 9.1 8.7 8.9   BILIRUBIN mg/dL 0.6 1.3* 1.2   ALK PHOS U/L 398* 413* 350*   ALT (SGPT) U/L 434* 501* 526*   AST (SGOT) U/L 531* 833* 997*   GLUCOSE mg/dL 120* 143* 118*         Results Review:   I reviewed the patient's new clinical results.         Assessment/Plan     Narrative & Impression   EXAM/TECHNIQUE:  1. CT abdomen pelvis without contrast  2. CT lumbar spine without contrast     INDICATION: Fall, abdominal pain, back pain     COMPARISON: 02/08/2022     DLP: 252 mGy cm. Automated exposure control was also utilized to  decrease patient radiation dose.     FINDINGS:     CT abdomen pelvis without contrast:      Mild atelectasis in the included lung bases. The unenhanced liver,  gallbladder, adrenal glands, and pancreas are unremarkable. Gallbladder  is distended without evidence of wall thickening or gallstones. No large  renal lesion. No urolithiasis or hydronephrosis. No focal urinary  bladder abnormality.     Large volume stool in the rectum and distal colon. Long segment wall  thickening of the sigmoid colon and distal descending colon with  surrounding fat stranding. A few scattered colonic diverticula are also  present within this region. The ascending and transverse colon appear  unremarkable. No secondary signs of appendicitis. No small bowel  distention or evidence of active small bowel inflammation.     No ascites or free pelvic fluid. No pelvic mass or pelvic collection.  Prior hysterectomy.     Normal caliber abdominal aorta with atherosclerotic calcification. No  enlarged abdominal or pelvic lymph nodes. No acute pelvic or hip  fracture. Sacrum appears intact. SI joints and pubic symphysis are  intact.     CT lumbar spine without contrast:     5 lumbar type vertebral bodies. Grade 1 anterolisthesis of L4 on L5,  likely degenerative in nature. Lumbar vertebral body heights are  maintained. No acute fracture. Moderate multilevel lumbar spine  degenerative change with multilevel central canal or neural foraminal  stenosis.     IMPRESSION:     1.  No acute traumatic finding in the abdomen or pelvis.     2.  No acute lumbar spine fracture.     3.  Large volume stool in the rectum and distal colon. Wall thickening  and inflammation of the sigmoid colon and distal descending colon,  compatible with acute colitis. This may be related to stercoral colitis  although differential also includes infectious colitis and acute  diverticulitis given the presence of small diverticula.     4.  Multilevel lumbar spine degenerative change and grade 1  anterolisthesis of L4 on L5 which is likely degenerative.     5.  Distended  gallbladder without evidence of acute cholecystitis.  This report was finalized on 09/04/2023 18:58 by Dr. Bakari Ramos MD     Narrative & Impression   EXAM/TECHNIQUE: CT head without contrast     INDICATION: Head trauma, moderate-severe     COMPARISON: 02/08/2022     DLP: 697 mGy cm. Automated exposure control was also utilized to  decrease patient radiation dose.     FINDINGS:     No evidence of intracranial hemorrhage. Gray-white differentiation is  maintained. Chronic microvascular ischemic white matter change and  global cerebral volume loss. Mild ex vacuo ventricular dilatation. No  midline shift or mass effect. Basilar cisterns are patent. No acute  orbital finding. Mastoid air cells are clear. Chronic RIGHT maxillary  sinus mucosal thickening and mucosal retention cyst. Motion artifact  limits evaluation of the skull base. No acute osseous finding.     IMPRESSION:     1.  No acute intracranial findings.  2.  Global cerebral volume loss and presumed chronic microvascular  ischemic white matter change.  This report was finalized on 09/04/2023 18:44 by Dr. Bakari Ramos MD     Narrative & Impression   EXAM/TECHNIQUE: CT cervical spine without contrast     INDICATION: Neck trauma (Age >= 65y)     COMPARISON: None     DLP: 367 mGy cm. Automated exposure control was also utilized to  decrease patient radiation dose.     FINDINGS:     Craniocervical relationships are maintained. No evidence of acute  odontoid process fracture. Corticated ossific fragment along the tip of  the odontoid may be related to old injury. Trace anterolisthesis of C4  on C5. Cervical spine alignment is otherwise maintained. Vertebral body  heights are maintained. No acute fracture. Facet joints are anatomically  aligned. Moderate multilevel cervical spine degenerative change with  multilevel neural foraminal stenosis, most pronounced at C5-C6.  Paravertebral soft tissues are unremarkable. Carotid artery  atherosclerotic calcifications  are noted.     IMPRESSION:     1.  No acute osseous findings.  2.  Moderate multilevel cervical spine degenerative change, greatest at  C5-C6.  3.  Trace anterolisthesis of C4 on C5, likely degenerative in nature.  This report was finalized on 09/04/2023 18:49 by Dr. Bakari Ramos MD.     Imaging    Assessment & Plan   Narrative & Impression   US ABDOMEN LIMITED- 9/6/2023 9:24 AM CDT     REASON FOR EXAM: transaminitis; N30.00-Acute cystitis without hematuria;  M62.82-Rhabdomyolysis; N17.9-Acute kidney failure, unspecified;  I50.9-Heart failure, unspecified; R62.7-Adult failure to thrive;  W19.XXXA-Unspecified fall, initial encounter; R13.10-Dysphagia,  unspecified       COMPARISON: CT abdomen pelvis 9/4/2023.      TECHNIQUE: Multiple longitudinal and transverse realtime sonographic  images of the right upper quadrant of the abdomen are obtained.      FINDINGS:    Pancreas: Normal in size and echogenicity.      Liver: Normal in size, echogenicity and echotexture. No focal lesion.      Gallbladder: The gallbladder is mildly distended. No visualized stones.  Borderline gallbladder wall thickening measuring up to 3 mm. No  pericholecystic fluid..      Bile ducts: The CBD measures 0.7 cm in diameter. There is no  intrahepatic or extrahepatic ductal dilation.     Right kidney: Measures 9.2 cm in length. Normal in sonographic  appearance.     Other: No ascites.      IMPRESSION:     Mildly distended gallbladder with borderline gallbladder wall  thickening. Recommend correlation for early acute or chronic  cholecystitis. No visualized gallstones. No pericholecystic fluid. This  could be further evaluated with HIDA scan if clinically indicated.  This report was finalized on 09/06/2023 11:17 by  Sunny Degroot DO.       UTI (urinary tract infection)    Gastroesophageal reflux disease    Spinal stenosis, lumbar region, without neurogenic claudication    Non-traumatic rhabdomyolysis    Chronic pain syndrome    Chronic  prescription opiate use    Hypothyroidism (acquired)    Essential hypertension    TOMÁS (acute kidney injury)    Colitis    Moderate malnutrition    Transaminitis    Acute cholecystitis    Cholecystitis      Patient with anxiety, bronchitis, chronic pain syndrome, fibromyalgia, recent loss of balance and stumbling, constipation obstipation recent fecal impaction, now with acute cholecystitis.  Plan for IV antibiotics, IV hydration, proceed with laparoscopic exploration cholecystectomy and treatment the above problem.  She is aware the procedure the risk and benefits and gives her informed consent for surgery.  X-rays were reviewed, case reviewed with the physician, plan for surgical intervention.  [x] X-Ray  [x] Reviewed Records  [x] Called Physician/Consulted      I discussed the patient's findings and my recommendations with patient, family, nursing staff, and primary care team    Gerardo Arciniega MD  09/07/23  12:09 CDT    Time: Time spent with patient 45 minutes     Part of this note may be an electronic transcription/translation of spoken language to printed text using the Dragon Dictation System.

## 2023-09-07 NOTE — CASE MANAGEMENT/SOCIAL WORK
Continued Stay Note  KATERINE Laguerre     Patient Name: Jennifer Gomes  MRN: [id number]  Today's Date: 9/7/2023    Admit Date: 9/4/2023    Plan: Home Health   Discharge Plan       Row Name 09/07/23 1514       Plan    Plan Home Health    Plan Comments Pt/family not wanting pt to go to snf. Pt currently in OR so will follow in case the situation changes. Will also check in with Blanka with [facility].                   Discharge Codes    No documentation.                       OSKAR Vázquez

## 2023-09-07 NOTE — ANESTHESIA POSTPROCEDURE EVALUATION
Patient: Jennifer Gomes    Procedure Summary       Date: 09/07/23 Room / Location:  [facility] OR  /  [facility]    Anesthesia Start: 1312 Anesthesia Stop: 1503    Procedure: CHOLECYSTECTOMY LAPAROSCOPIC INTRAOPERATIVE CHOLANGIOGRAM (Abdomen) Diagnosis:       Acute cholecystitis      Cholecystitis      (Acute cholecystitis [K81.0])      (Cholecystitis [K81.9])    Surgeons: Gerardo Arciniega MD Provider: Angel De Leon CRNA    Anesthesia Type: general ASA Status: 3 - Emergent            Anesthesia Type: general    Vitals  Vitals Value Taken Time   /50 09/07/23 1501   Temp 98.2 °F (36.8 °C) 09/07/23 1459   Pulse 81 09/07/23 1503   Resp 18 09/07/23 1459   SpO2 95 % 09/07/23 1503   Vitals shown include unvalidated device data.        Post Anesthesia Care and Evaluation    Patient location during evaluation: PACU  Patient participation: complete - patient participated  Level of consciousness: responsive to light touch and sleepy but conscious  Pain score: 0  Pain management: adequate    Airway patency: patent  Anesthetic complications: No anesthetic complications  PONV Status: none  Cardiovascular status: acceptable and hemodynamically stable  Respiratory status: acceptable, spontaneous ventilation and face mask  Hydration status: acceptable

## 2023-09-07 NOTE — PLAN OF CARE
Goal Outcome Evaluation:  Plan of Care Reviewed With: patient        Progress: no change  Outcome Evaluation: Patient had laparoscopic cholecystectomy today. Lap x4 clean, dry and intact. IVF infusing. Abx given as ordered. Tele on. 4L O2. Purewick in use. Turn every 2 hours. Confused; disoriented to time, situation, and place. Clear liquid diet started for this evening. Safety maintained.

## 2023-09-08 LAB
ALBUMIN SERPL-MCNC: 2.3 G/DL (ref 3.5–5.2)
ALBUMIN/GLOB SERPL: 0.7 G/DL
ALP SERPL-CCNC: 290 U/L (ref 39–117)
ALT SERPL W P-5'-P-CCNC: 267 U/L (ref 1–33)
ANION GAP SERPL CALCULATED.3IONS-SCNC: 8 MMOL/L (ref 5–15)
AST SERPL-CCNC: 213 U/L (ref 1–32)
BILIRUB SERPL-MCNC: 0.3 MG/DL (ref 0–1.2)
BUN SERPL-MCNC: 18 MG/DL (ref 8–23)
BUN/CREAT SERPL: 25 (ref 7–25)
CALCIUM SPEC-SCNC: 8.5 MG/DL (ref 8.6–10.5)
CHLORIDE SERPL-SCNC: 113 MMOL/L (ref 98–107)
CO2 SERPL-SCNC: 19 MMOL/L (ref 22–29)
CREAT SERPL-MCNC: 0.72 MG/DL (ref 0.57–1)
DEPRECATED RDW RBC AUTO: 55.8 FL (ref 37–54)
EGFRCR SERPLBLD CKD-EPI 2021: 84.6 ML/MIN/1.73
ERYTHROCYTE [DISTWIDTH] IN BLOOD BY AUTOMATED COUNT: 15.7 % (ref 12.3–15.4)
GLOBULIN UR ELPH-MCNC: 3.1 GM/DL
GLUCOSE SERPL-MCNC: 154 MG/DL (ref 65–99)
HCT VFR BLD AUTO: 24.9 % (ref 34–46.6)
HGB BLD-MCNC: 7.7 G/DL (ref 12–15.9)
MCH RBC QN AUTO: 29.7 PG (ref 26.6–33)
MCHC RBC AUTO-ENTMCNC: 30.9 G/DL (ref 31.5–35.7)
MCV RBC AUTO: 96.1 FL (ref 79–97)
PLATELET # BLD AUTO: 154 10*3/MM3 (ref 140–450)
PMV BLD AUTO: 11.5 FL (ref 6–12)
POTASSIUM SERPL-SCNC: 3.5 MMOL/L (ref 3.5–5.2)
PROT SERPL-MCNC: 5.4 G/DL (ref 6–8.5)
RBC # BLD AUTO: 2.59 10*6/MM3 (ref 3.77–5.28)
SODIUM SERPL-SCNC: 140 MMOL/L (ref 136–145)
WBC NRBC COR # BLD: 8.24 10*3/MM3 (ref 3.4–10.8)

## 2023-09-08 PROCEDURE — 25010000002 CEFTRIAXONE PER 250 MG: Performed by: SPECIALIST

## 2023-09-08 PROCEDURE — 97530 THERAPEUTIC ACTIVITIES: CPT | Performed by: PHYSICAL THERAPIST

## 2023-09-08 PROCEDURE — 25010000002 FUROSEMIDE PER 20 MG: Performed by: SPECIALIST

## 2023-09-08 PROCEDURE — 25010000002 CEFOXITIN PER 1 G: Performed by: SPECIALIST

## 2023-09-08 PROCEDURE — 99024 POSTOP FOLLOW-UP VISIT: CPT | Performed by: SPECIALIST

## 2023-09-08 PROCEDURE — 80053 COMPREHEN METABOLIC PANEL: CPT | Performed by: SPECIALIST

## 2023-09-08 PROCEDURE — 25010000002 ENOXAPARIN PER 10 MG: Performed by: SPECIALIST

## 2023-09-08 PROCEDURE — 97161 PT EVAL LOW COMPLEX 20 MIN: CPT | Performed by: PHYSICAL THERAPIST

## 2023-09-08 PROCEDURE — 85027 COMPLETE CBC AUTOMATED: CPT | Performed by: SPECIALIST

## 2023-09-08 PROCEDURE — 25010000002 METRONIDAZOLE 500 MG/100ML SOLUTION: Performed by: SPECIALIST

## 2023-09-08 PROCEDURE — 25010000002 NA FERRIC GLUC CPLX PER 12.5 MG: Performed by: SPECIALIST

## 2023-09-08 PROCEDURE — 97535 SELF CARE MNGMENT TRAINING: CPT

## 2023-09-08 RX ORDER — FUROSEMIDE 10 MG/ML
20 INJECTION INTRAMUSCULAR; INTRAVENOUS ONCE
Status: COMPLETED | OUTPATIENT
Start: 2023-09-08 | End: 2023-09-08

## 2023-09-08 RX ORDER — POTASSIUM CHLORIDE 7.45 MG/ML
10 INJECTION INTRAVENOUS
Status: DISCONTINUED | OUTPATIENT
Start: 2023-09-08 | End: 2023-09-08

## 2023-09-08 RX ORDER — POTASSIUM CHLORIDE 750 MG/1
20 CAPSULE, EXTENDED RELEASE ORAL EVERY 4 HOURS
Status: COMPLETED | OUTPATIENT
Start: 2023-09-08 | End: 2023-09-08

## 2023-09-08 RX ADMIN — METRONIDAZOLE 500 MG: 500 INJECTION, SOLUTION INTRAVENOUS at 18:12

## 2023-09-08 RX ADMIN — Medication 10 ML: at 20:50

## 2023-09-08 RX ADMIN — DONEPEZIL HYDROCHLORIDE 10 MG: 10 TABLET, FILM COATED ORAL at 20:48

## 2023-09-08 RX ADMIN — POTASSIUM CHLORIDE 20 MEQ: 10 CAPSULE, COATED, EXTENDED RELEASE ORAL at 18:11

## 2023-09-08 RX ADMIN — CEFTRIAXONE SODIUM 1000 MG: 1 INJECTION, POWDER, FOR SOLUTION INTRAMUSCULAR; INTRAVENOUS at 20:49

## 2023-09-08 RX ADMIN — PANTOPRAZOLE SODIUM 40 MG: 40 TABLET, DELAYED RELEASE ORAL at 05:15

## 2023-09-08 RX ADMIN — ACETAMINOPHEN 975 MG: 325 TABLET, FILM COATED ORAL at 23:29

## 2023-09-08 RX ADMIN — Medication 10 ML: at 08:01

## 2023-09-08 RX ADMIN — SODIUM CHLORIDE 2000 MG: 900 INJECTION INTRAVENOUS at 00:29

## 2023-09-08 RX ADMIN — HYDROCODONE BITARTRATE AND ACETAMINOPHEN 1 TABLET: 7.5; 325 TABLET ORAL at 18:14

## 2023-09-08 RX ADMIN — BUTALBITAL, ACETAMINOPHEN, AND CAFFEINE 1 TABLET: 50; 325; 40 TABLET ORAL at 23:29

## 2023-09-08 RX ADMIN — POTASSIUM CHLORIDE 20 MEQ: 10 CAPSULE, COATED, EXTENDED RELEASE ORAL at 08:47

## 2023-09-08 RX ADMIN — FUROSEMIDE 20 MG: 10 INJECTION, SOLUTION INTRAMUSCULAR; INTRAVENOUS at 08:48

## 2023-09-08 RX ADMIN — METRONIDAZOLE 500 MG: 500 INJECTION, SOLUTION INTRAVENOUS at 08:00

## 2023-09-08 RX ADMIN — VENLAFAXINE 25 MG: 50 TABLET ORAL at 08:00

## 2023-09-08 RX ADMIN — DEXTROSE AND SODIUM CHLORIDE 125 ML/HR: 5; 450 INJECTION, SOLUTION INTRAVENOUS at 01:02

## 2023-09-08 RX ADMIN — METRONIDAZOLE 500 MG: 500 INJECTION, SOLUTION INTRAVENOUS at 01:04

## 2023-09-08 RX ADMIN — CARVEDILOL 12.5 MG: 6.25 TABLET, FILM COATED ORAL at 07:55

## 2023-09-08 RX ADMIN — SUCRALFATE 1 G: 1 TABLET ORAL at 20:49

## 2023-09-08 RX ADMIN — SODIUM CHLORIDE 125 MG: 9 INJECTION, SOLUTION INTRAVENOUS at 08:48

## 2023-09-08 RX ADMIN — POTASSIUM CHLORIDE 20 MEQ: 10 CAPSULE, COATED, EXTENDED RELEASE ORAL at 12:26

## 2023-09-08 RX ADMIN — OXYCODONE HYDROCHLORIDE 5 MG: 5 TABLET ORAL at 12:29

## 2023-09-08 RX ADMIN — SERTRALINE HYDROCHLORIDE 25 MG: 50 TABLET, FILM COATED ORAL at 08:00

## 2023-09-08 RX ADMIN — ACETAMINOPHEN 975 MG: 325 TABLET, FILM COATED ORAL at 05:15

## 2023-09-08 RX ADMIN — CARVEDILOL 12.5 MG: 6.25 TABLET, FILM COATED ORAL at 18:11

## 2023-09-08 RX ADMIN — ENOXAPARIN SODIUM 30 MG: 100 INJECTION SUBCUTANEOUS at 18:12

## 2023-09-08 RX ADMIN — BUTALBITAL, ACETAMINOPHEN, AND CAFFEINE 1 TABLET: 50; 325; 40 TABLET ORAL at 08:00

## 2023-09-08 RX ADMIN — ACETAMINOPHEN 975 MG: 325 TABLET, FILM COATED ORAL at 13:48

## 2023-09-08 RX ADMIN — SUCRALFATE 1 G: 1 TABLET ORAL at 18:14

## 2023-09-08 RX ADMIN — SUCRALFATE 1 G: 1 TABLET ORAL at 12:25

## 2023-09-08 RX ADMIN — SUCRALFATE 1 G: 1 TABLET ORAL at 07:54

## 2023-09-08 RX ADMIN — LEVOTHYROXINE SODIUM 50 MCG: 50 TABLET ORAL at 07:54

## 2023-09-08 NOTE — CASE MANAGEMENT/SOCIAL WORK
Continued Stay Note   Carlotta     Patient Name: Jennifer Gomes  MRN: [id number]  Today's Date: 9/8/2023    Admit Date: 9/4/2023    Plan: Home Health   Discharge Plan       Row Name 09/08/23 1003       Plan    Plan Home Health    Plan Comments Pt is wanting to go home at d/c. Have left a message for Blanka with [phone].                   Discharge Codes    No documentation.                       OSKAR Vázquez

## 2023-09-08 NOTE — CASE MANAGEMENT/SOCIAL WORK
Continued Stay Note  KATERINE Laguerre     Patient Name: Jennifer Gomes  MRN: [id number]  Today's Date: 9/8/2023    Admit Date: 9/4/2023    Plan: Home Health   Discharge Plan       Row Name 09/08/23 1446       Plan    Plan Home Health    Patient/Family in Agreement with Plan yes    Plan Comments Pt is planning to go home at d/c. Spoke with Blanka with [phone] and she knows pt is planning to go home at d/c. She will need to be contacted at that time so she can make a home visit when pt goes home.                   Discharge Codes    No documentation.                       OSKAR Vázquez

## 2023-09-08 NOTE — PLAN OF CARE
Goal Outcome Evaluation:  Plan of Care Reviewed With: patient        Progress: no change  Outcome Evaluation: Pt c/o pain frequently this shift, medicated with scheduled and PRN meds; PT/OT working with patient; she was able to take a couple of steps from her bed to BSC, but that was all she would do; still confused at times, but reorients easily; IID; voiding; still having some liquid stools; safety maintained.

## 2023-09-08 NOTE — THERAPY TREATMENT NOTE
Acute Care - Occupational Therapy Treatment Note  [facility]     Patient Name: Jennifer Gomes  : 1940  MRN: [id number]  Today's Date: 2023             Admit Date: 2023       ICD-10-CM ICD-9-CM   1. Acute cystitis without hematuria  N30.00 595.0   2. Non-traumatic rhabdomyolysis  M62.82 728.88   3. TOMÁS (acute kidney injury)  N17.9 584.9   4. Acute congestive heart failure, unspecified heart failure type  I50.9 428.0   5. Failure to thrive in adult  R62.7 783.7   6. Fall, initial encounter  W19.XXXA E888.9   7. Dysphagia, unspecified type  R13.10 787.20   8. Acute cholecystitis  K81.0 575.0   9. Cholecystitis  K81.9 575.10   10. Impaired mobility [Z74.09 (ICD-10-CM)]  Z74.09 799.89   11. Decreased activities of daily living (ADL) [Z78.9 (ICD-10-CM)]  Z78.9 V49.89     Patient Active Problem List   Diagnosis    Gastroesophageal reflux disease    Spinal stenosis, lumbar region, without neurogenic claudication    Overweight    Non-smoker    Erosion of vaginal mesh    Adenocarcinoma of endometrium    Encounter for consultation    S/P hysterectomy with oophorectomy    Encounter for follow-up surveillance of endometrial cancer    History of radiation therapy    Obesity (BMI 30-39.9)    Non-traumatic rhabdomyolysis    Metabolic encephalopathy    Acute renal failure superimposed on stage 3 chronic kidney disease    Acute respiratory failure with hypoxia and hypercapnia    Medical non-compliance, does not take narcotics as prescribed    SIRS (systemic inflammatory response syndrome)    Chronic constipation    Chronic pain syndrome    Chronic prescription opiate use    Normocytic anemia    Hypothyroidism (acquired)    Essential hypertension    TOMÁS (acute kidney injury)    Venous insufficiency of both lower extremities    Fluid retention    Low blood pressure reading    Obesity, Class III, BMI 40-49.9 (morbid obesity)    Chronic intractable headache    RAFAL (obstructive sleep apnea)    Change in bowel habits     Altered mental status    Toxic metabolic encephalopathy    Polypharmacy    Frequent falls    Grade III internal hemorrhoids    External hemorrhoids    E. coli UTI    Colitis    Moderate malnutrition    Transaminitis    Acute cholecystitis     Past Medical History:   Diagnosis Date    Anxiety     Arthritis     Bronchitis     Cancer     uterine    Chronic pain     Depression     Disease of thyroid gland     Fibromyalgia     GERD (gastroesophageal reflux disease)     Headache     History of transfusion     AS     Hyperlipidemia     Hypertension     Incontinence     Insomnia     Leg pain     Lumbar stenosis     Migraines     Peptic ulcer     Restless legs     Sleep apnea     NO C-PAP    UTI (urinary tract infection)     Vaginal bleeding      Past Surgical History:   Procedure Laterality Date    BLADDER REPAIR      MESH HAD TO BE REMOVED IN     BREAST BIOPSY Right 2017    benign    BREAST CYST EXCISION Left     CARDIAC CATHETERIZATION      CARPAL TUNNEL RELEASE      CATARACT EXTRACTION W/ INTRAOCULAR LENS  IMPLANT, BILATERAL      CHOLECYSTECTOMY WITH INTRAOPERATIVE CHOLANGIOGRAM N/A 2023    Procedure: CHOLECYSTECTOMY LAPAROSCOPIC INTRAOPERATIVE CHOLANGIOGRAM;  Surgeon: Gerardo Arciniega MD;  Location: [facility] OR;  Service: General;  Laterality: N/A;    COLONOSCOPY      COLONOSCOPY N/A 10/01/2021    Procedure: COLONOSCOPY WITH ANESTHESIA;  Surgeon: Tom Velasco DO;  Location:  [facility] ENDOSCOPY;  Service: Gastroenterology;  Laterality: N/A;  pre: change in bowel habits  post: diverticulosis. hemorrhoids.   Olivia Mora APRN        CYSTECTOMY      D & C HYSTEROSCOPY N/A 2017    Procedure: DILATATION AND CURETTAGE HYSTEROSCOPY;  Surgeon: Shasta Madrigal MD;  Location: [facility] OR;  Service:     DILATION AND CURETTAGE, DIAGNOSTIC / THERAPEUTIC      ENDOSCOPY  2010    Short segment of Arriola's,Moderate chroninc esophagogastritis and negative H.pylori    ENDOSCOPY N/A 2017     Procedure: ESOPHAGOGASTRODUODENOSCOPY WITH ANESTHESIA;  Surgeon: Tom Velasco DO;  Location: [facility] ENDOSCOPY;  Service:     EYE SURGERY      RETINA    HEMORRHOIDECTOMY SIGMOIDOSCOPY N/A 3/21/2023    Procedure: HEMORRHOIDECTOMY WITH EXAM UNDER ANESTHESIA;  Surgeon: Holly Chavez MD;  Location:  [facility] OR;  Service: General;  Laterality: N/A;    HYSTERECTOMY  12/20/2017    ORIF TIBIA/FIBULA FRACTURES Left 2000    TRANSVAGINAL TAPING SUSPENSION N/A 11/06/2017    Procedure: VAGINAL MESH REVISION;  Surgeon: Shasta Madrigal MD;  Location:  [facility] OR;  Service:     VAGINAL MESH REVISION  2013         OT ASSESSMENT FLOWSHEET (last 12 hours)       OT Evaluation and Treatment       Row Name 09/08/23 1513                   OT Time and Intention    Subjective Information complains of;weakness;pain  -TS        Document Type therapy note (daily note)  -TS        Mode of Treatment occupational therapy  -TS        Patient Effort good  -TS           General Information    Existing Precautions/Restrictions fall  -TS           Pain Assessment    Pretreatment Pain Rating 3/10  -TS        Posttreatment Pain Rating 4/10  -TS        Pain Location - Side/Orientation Bilateral  -TS        Pain Location lower  -TS        Pain Location - extremity  -TS           Cognition    Orientation Status (Cognition) oriented to;person;place;time;situation  -TS           Activities of Daily Living    BADL Assessment/Intervention lower body dressing;toileting  -TS           Lower Body Dressing Assessment/Training    [place] Level (Lower Body Dressing) don;socks;pants/bottoms;moderate assist (50% patient effort)  -TS        Position (Lower Body Dressing) unsupported sitting;supported standing  -TS           Toileting Assessment/Training    [place] Level (Toileting) toileting skills;supervision;adjust/manage clothing;moderate assist (50% patient effort);perform perineal hygiene;minimum assist (75% patient effort)  -TS        Assistive  Devices (Toileting) commode;grab bar/safety frame  -TS        Position (Toileting) unsupported sitting;supported standing  -TS           Bed Mobility    Supine-Sit [place] (Bed Mobility) minimum assist (75% patient effort)  -TS        Assistive Device (Bed Mobility) bed rails;head of bed elevated  -TS           Functional Mobility    Functional Mobility- Ind. Level contact guard assist  -TS        Functional Mobility- Device walker, front-wheeled  -TS        Functional Mobility- Comment from bed to BR  -TS           Transfer Assessment/Treatment    Transfers sit-stand transfer;stand-sit transfer;toilet transfer  -TS           Bed-Chair Transfer    Assistive Device (Bed-Chair Transfers) walker, front-wheeled  -TS           Sit-Stand Transfer    Sit-Stand [place] (Transfers) contact guard  -TS        Assistive Device (Sit-Stand Transfers) walker, front-wheeled  -TS           Stand-Sit Transfer    Stand-Sit [place] (Transfers) contact guard;verbal cues  -TS        Assistive Device (Stand-Sit Transfers) walker, front-wheeled  -TS           Toilet Transfer    Type (Toilet Transfer) sit-stand;stand-sit  -TS        [place] Level (Toilet Transfer) contact guard  -TS        Assistive Device (Toilet Transfer) commode;grab bars/safety frame  -TS           Wound 09/07/23 1341 abdomen Incision    Wound - Properties Group Placement Date: 09/07/23  -ZE Placement Time: 1341  -ZE Present on Hospital Admission: N  -ZE Location: abdomen  -ZE Primary Wound Type: Incision  -ZE    Retired Wound - Properties Group Placement Date: 09/07/23  -ZE Placement Time: 1341  -ZE Present on Hospital Admission: N  -ZE Location: abdomen  -ZE Primary Wound Type: Incision  -ZE    Retired Wound - Properties Group Date first assessed: 09/07/23  -ZE Time first assessed: 1341  -ZE Present on Hospital Admission: N  -ZE Location: abdomen  -ZE Primary Wound Type: Incision  -ZE       Plan of Care Review    Plan of Care Reviewed With patient   -TS        Progress improving  -TS           Positioning and Restraints    Pre-Treatment Position in bed  -TS        Post Treatment Position chair  -TS        In Chair reclined;call light within reach;encouraged to call for assist;exit alarm on;notified nsg  -TS                  User Key  (r) = Recorded By, (t) = Taken By, (c) = Cosigned By      Initials Name Effective Dates    TS Lisa Gamez, ZAKIA 02/03/23 -     Aubrey Littlejohn RN 02/17/22 -                      Occupational Therapy Education       Title: PT OT SLP Therapies (In Progress)       Topic: Occupational Therapy (In Progress)       Point: ADL training (Done)       Description:   Instruct learner(s) on proper safety adaptation and remediation techniques during self care or transfers.   Instruct in proper use of assistive devices.                  Learning Progress Summary             Patient Acceptance, E,D, VU,NR by  at 9/6/2023 1017                         Point: Home exercise program (Not Started)       Description:   Instruct learner(s) on appropriate technique for monitoring, assisting and/or progressing therapeutic exercises/activities.                  Learner Progress:  Not documented in this visit.              Point: Precautions (Not Started)       Description:   Instruct learner(s) on prescribed precautions during self-care and functional transfers.                  Learner Progress:  Not documented in this visit.              Point: Body mechanics (Done)       Description:   Instruct learner(s) on proper positioning and spine alignment during self-care, functional mobility activities and/or exercises.                  Learning Progress Summary             Patient Acceptance, E,D, VU,NR by  at 9/6/2023 1017                                         User Key       Initials Effective Dates Name Provider Type Discipline     07/11/23 -  Jennifer Briceno, OTR/L Occupational Therapist OT                      OT Recommendation and  Plan     Plan of Care Review  Plan of Care Reviewed With: patient  Progress: improving  Plan of Care Reviewed With: patient        Time Calculation:    Time Calculation- OT       Row Name 09/08/23 1551             Time Calculation- OT    OT Start Time 1513  -TS      OT Stop Time 1551  -TS      OT Time Calculation (min) 38 min  -TS      Total Timed Code Minutes- OT 38 minute(s)  -TS      OT Received On 09/08/23  -TS         Timed Charges    51952 - OT Self Care/Mgmt Minutes 38  -TS         Total Minutes    Timed Charges Total Minutes 38  -TS       Total Minutes 38  -TS                User Key  (r) = Recorded By, (t) = Taken By, (c) = Cosigned By      Initials Name Provider Type    TS Lisa Gamez COTA Occupational Therapist Assistant                  Therapy Charges for Today       Code Description Service Date Service Provider Modifiers Qty    [id number] HC OT SELF CARE/MGMT/TRAIN EA 15 MIN 9/8/2023 Lisa Gamez COTA GO 3                 ZAKIA Mitchell  9/8/2023

## 2023-09-08 NOTE — PLAN OF CARE
Goal Outcome Evaluation:  Plan of Care Reviewed With: patient        Progress: no change  Pt rested well this shift after attempting to get out of bed at start of shift. Lap x4 clean, dry and intact. IVF infusing. Abx given as ordered. Tele on. 1L O2. Purewick removed due to having liquid BM's x 2 this shift.  Turn every 2 hours. Confused; disoriented to situation, and place. Safety maintained.

## 2023-09-08 NOTE — THERAPY EVALUATION
Patient Name: Jennifer Gomes  : 1940    MRN: [id number]                              Today's Date: 2023       Admit Date: 2023    Visit Dx:     ICD-10-CM ICD-9-CM   1. Acute cystitis without hematuria  N30.00 595.0   2. Non-traumatic rhabdomyolysis  M62.82 728.88   3. TOMÁS (acute kidney injury)  N17.9 584.9   4. Acute congestive heart failure, unspecified heart failure type  I50.9 428.0   5. Failure to thrive in adult  R62.7 783.7   6. Fall, initial encounter  W19.XXXA E888.9   7. Dysphagia, unspecified type  R13.10 787.20   8. Acute cholecystitis  K81.0 575.0   9. Cholecystitis  K81.9 575.10   10. Impaired mobility [Z74.09 (ICD-10-CM)]  Z74.09 799.89     Patient Active Problem List   Diagnosis    Gastroesophageal reflux disease    Spinal stenosis, lumbar region, without neurogenic claudication    Overweight    Non-smoker    Erosion of vaginal mesh    Adenocarcinoma of endometrium    Encounter for consultation    S/P hysterectomy with oophorectomy    Encounter for follow-up surveillance of endometrial cancer    History of radiation therapy    Obesity (BMI 30-39.9)    Non-traumatic rhabdomyolysis    Metabolic encephalopathy    Acute renal failure superimposed on stage 3 chronic kidney disease    Acute respiratory failure with hypoxia and hypercapnia    Medical non-compliance, does not take narcotics as prescribed    SIRS (systemic inflammatory response syndrome)    Chronic constipation    Chronic pain syndrome    Chronic prescription opiate use    Normocytic anemia    Hypothyroidism (acquired)    Essential hypertension    TOMÁS (acute kidney injury)    Venous insufficiency of both lower extremities    Fluid retention    Low blood pressure reading    Obesity, Class III, BMI 40-49.9 (morbid obesity)    Chronic intractable headache    RAFAL (obstructive sleep apnea)    Change in bowel habits    Altered mental status    Toxic metabolic encephalopathy    Polypharmacy    Frequent falls    Grade III internal  hemorrhoids    External hemorrhoids    E. coli UTI    Colitis    Moderate malnutrition    Transaminitis    Acute cholecystitis     Past Medical History:   Diagnosis Date    Anxiety     Arthritis     Bronchitis     Cancer     uterine    Chronic pain     Depression     Disease of thyroid gland     Fibromyalgia     GERD (gastroesophageal reflux disease)     Headache     History of transfusion     AS     Hyperlipidemia     Hypertension     Incontinence     Insomnia     Leg pain     Lumbar stenosis     Migraines     Peptic ulcer     Restless legs     Sleep apnea     NO C-PAP    UTI (urinary tract infection)     Vaginal bleeding      Past Surgical History:   Procedure Laterality Date    BLADDER REPAIR      MESH HAD TO BE REMOVED IN 2013    BREAST BIOPSY Right 2017    benign    BREAST CYST EXCISION Left 1982    CARDIAC CATHETERIZATION      CARPAL TUNNEL RELEASE      CATARACT EXTRACTION W/ INTRAOCULAR LENS  IMPLANT, BILATERAL      CHOLECYSTECTOMY WITH INTRAOPERATIVE CHOLANGIOGRAM N/A 2023    Procedure: CHOLECYSTECTOMY LAPAROSCOPIC INTRAOPERATIVE CHOLANGIOGRAM;  Surgeon: Gerardo Arciniega MD;  Location: [facility] OR;  Service: General;  Laterality: N/A;    COLONOSCOPY      COLONOSCOPY N/A 10/01/2021    Procedure: COLONOSCOPY WITH ANESTHESIA;  Surgeon: Tom Velasco DO;  Location: [facility] ENDOSCOPY;  Service: Gastroenterology;  Laterality: N/A;  pre: change in bowel habits  post: diverticulosis. hemorrhoids.   Olivia Mora APRN        CYSTECTOMY      D & C HYSTEROSCOPY N/A 2017    Procedure: DILATATION AND CURETTAGE HYSTEROSCOPY;  Surgeon: Shasta Madrigal MD;  Location: [facility] OR;  Service:     DILATION AND CURETTAGE, DIAGNOSTIC / THERAPEUTIC      ENDOSCOPY  2010    Short segment of Arriola's,Moderate chroninc esophagogastritis and negative H.pylori    ENDOSCOPY N/A 2017    Procedure: ESOPHAGOGASTRODUODENOSCOPY WITH ANESTHESIA;  Surgeon: Tom Velasco DO;  Location: [facility]  ENDOSCOPY;  Service:     EYE SURGERY      RETINA    HEMORRHOIDECTOMY SIGMOIDOSCOPY N/A 3/21/2023    Procedure: HEMORRHOIDECTOMY WITH EXAM UNDER ANESTHESIA;  Surgeon: Holly Chavez MD;  Location:  [facility] OR;  Service: General;  Laterality: N/A;    HYSTERECTOMY  12/20/2017    ORIF TIBIA/FIBULA FRACTURES Left 2000    TRANSVAGINAL TAPING SUSPENSION N/A 11/06/2017    Procedure: VAGINAL MESH REVISION;  Surgeon: Shasta Madrigal MD;  Location:  [facility] OR;  Service:     VAGINAL MESH REVISION  2013      General Information       Row Name 09/08/23 1100          Physical Therapy Time and Intention    Document Type evaluation  Admitted s/p acute cholecystitis with cholecystectomy procedure. UTI with confusion. Oriented to self.  -MS (r) JS (t) MS (c)     Mode of Treatment physical therapy  -MS (r) JS (t) MS (c)       Row Name 09/08/23 1100          General Information    Patient Profile Reviewed yes  -MS (r) JS (t) MS (c)     Prior Level of Function independent:;all household mobility;bed mobility;transfer;mod assist:;bathing;dependent:;cooking;cleaning;driving  -MS (r) JS (t) MS (c)     Existing Precautions/Restrictions fall  -MS (r) JS (t) MS (c)     Barriers to Rehab medically complex  -MS (r) JS (t) MS (c)       Row Name 09/08/23 1100          Living Environment    People in Home child(sebastian), adult;spouse  -MS (r) JS (t) MS (c)       Row Name 09/08/23 1100          Home Main Entrance    Number of Stairs, Main Entrance two  -MS (r) JS (t) MS (c)     Stair Railings, Main Entrance --  -MS (r) JS (t) MS (c)       Row Name 09/08/23 1100          Stairs Within Home, Primary    Number of Stairs, Within Home, Primary none  -MS (r) JS (t) MS (c)     Stair Railings, Within Home, Primary none  -MS (r) JS (t) MS (c)       Row Name 09/08/23 1100          Cognition    Orientation Status (Cognition) oriented to;person  -MS (r) JS (t) MS (c)       Row Name 09/08/23 1100          Safety Issues, Functional Mobility    Safety Issues  Affecting Function (Mobility) ability to follow commands;at risk behavior observed;awareness of need for assistance;friction/shear risk;impulsivity;insight into deficits/self-awareness;judgment;problem-solving  -MS (r) JS (t) MS (c)     Impairments Affecting Function (Mobility) balance;cognition;endurance/activity tolerance;pain;postural/trunk control;range of motion (ROM);sensation/sensory awareness;shortness of breath;strength  -MS (r) JS (t) MS (c)     Cognitive Impairments, Mobility Safety/Performance impulsivity;attention;judgment;problem-solving/reasoning  -MS (r) JS (t) MS (c)     Comment, Safety Issues/Impairments (Mobility) Uses Roll aider at home. Step over tub with grab bar. Says she does not have a grab bar but needs one at toilet.  -MS (r) JS (t) MS (c)               User Key  (r) = Recorded By, (t) = Taken By, (c) = Cosigned By      Initials Name Provider Type    Merari Singh, PT, DPT, NCS Physical Therapist    Rick Diop, HUNTER Student PT Student                   Mobility       Row Name 09/08/23 1100          Bed Mobility    Bed Mobility sit-supine;supine-sit  -MS (r) JS (t) MS (c)     Supine-Sit [place] (Bed Mobility) moderate assist (50% patient effort);verbal cues  -MS (r) JS (t) MS (c)     Sit-Supine [place] (Bed Mobility) moderate assist (50% patient effort)  -MS (r) JS (t) MS (c)     Assistive Device (Bed Mobility) bed rails;head of bed elevated  -MS (r) JS (t) MS (c)       Row Name 09/08/23 1100          Bed-Chair Transfer    Bed-Chair [place] (Transfers) maximum assist (25% patient effort)  -MS (r) JS (t) MS (c)     Assistive Device (Bed-Chair Transfers) walker, front-wheeled  -MS (r) JS (t) MS (c)     Comment, (Bed-Chair Transfer) x1 from chair> bed to her L side.  -MS (r) JS (t) MS (c)       Row Name 09/08/23 1100          Sit-Stand Transfer    Sit-Stand [place] (Transfers) maximum assist (25% patient effort);verbal cues;nonverbal cues (demo/gesture)  -MS (r)  JS (t) MS (c)     Assistive Device (Sit-Stand Transfers) walker, front-wheeled  -MS (r) JS (t) MS (c)     Comment, (Sit-Stand Transfer) x2 this visit. Required verbal cues and demonstration on how to push through the chair/bed.  -MS (r) JS (t) MS (c)       Row Name 09/08/23 1100          Gait/Stairs (Locomotion)    [place] Level (Gait) minimum assist (75% patient effort);verbal cues  -MS (r) JS (t) MS (c)     Assistive Device (Gait) walker, front-wheeled  -MS (r) JS (t) MS (c)     Distance in Feet (Gait) 8  -MS (r) JS (t) MS (c)     Deviations/Abnormal Patterns (Gait) bilateral deviations;base of support, narrow;slade decreased;festinating/shuffling;gait speed decreased;stride length decreased  -MS (r) JS (t) MS (c)     Bilateral Gait Deviations forward flexed posture;decreased arm swing  -MS (r) JS (t) MS (c)               User Key  (r) = Recorded By, (t) = Taken By, (c) = Cosigned By      Initials Name Provider Type    Merari Singh, PT, DPT, NCS Physical Therapist    Rick Diop PT Student PT Student                   Obj/Interventions       Row Name 09/08/23 1100          Range of Motion Comprehensive    General Range of Motion bilateral lower extremity ROM WFL  -MS (r) JS (t) MS (c)       Row Name 09/08/23 1100          Strength Comprehensive (MMT)    Comment, General Manual Muscle Testing (MMT) Assessment LE: EHL 4/5 B; DF/PF 5/5 B; Knee extension 4/5 B; Knee flexion 3+/5 L, 3/5 R; Hip flexion 3/5 B.  -MS (r) JS (t) MS (c)       Row Name 09/08/23 1100          Motor Skills    Motor Skills coordination  -MS (r) JS (t) MS (c)     Coordination WFL;other (see comments)  Toe to finger smooth and appropriate.  -MS (r) JS (t) MS (c)       Row Name 09/08/23 1100          Balance    Balance Assessment sitting static balance;sitting dynamic balance;standing static balance  -MS (r) JS (t) MS (c)     Static Sitting Balance standby assist  -MS (r) JS (t) MS (c)     Dynamic Sitting Balance contact guard   -MS (r) JS (t) MS (c)     Static Standing Balance minimal assist  -MS (r) JS (t) MS (c)     Balance Interventions sitting;dynamic reaching;standing;supported  -MS (r) JS (t) MS (c)     Comment, Balance Required SBA for safety in seated. When performing dynamic reaching in seated, CGA provided for safety, but was able to reach outside [place] without loss of balance. In standing she required support from FWW and leaned on it heavily, requiring cueing to stand upright. She did not demonstrate ability to stand on her own.  -MS (r) JS (t) MS (c)       Row Name 09/08/23 1100          Sensory Assessment (Somatosensory)    Sensory Assessment (Somatosensory) bilateral LE  -MS (r) JS (t) MS (c)     Bilateral LE Sensory Assessment other (see comments);impaired  Sensation impaired bilaterally from malleoli distally.  -MS (r) JS (t) MS (c)               User Key  (r) = Recorded By, (t) = Taken By, (c) = Cosigned By      Initials Name Provider Type    Merari Singh, PT, DPT, NCS Physical Therapist    Rick Diop, PT Student PT Student                   Goals/Plan       Row Name 09/08/23 1100          Bed Mobility Goal 1 (PT)    Activity/Assistive Device (Bed Mobility Goal 1, PT) sit to supine;supine to sit;scooting  -MS (r) JS (t) MS (c)     [place] Level/Cues Needed (Bed Mobility Goal 1, PT) contact guard required  -MS (r) JS (t) MS (c)     Time Frame (Bed Mobility Goal 1, PT) long term goal (LTG);by discharge  -MS (r) JS (t) MS (c)     Progress/Outcomes (Bed Mobility Goal 1, PT) new goal  -MS (r) JS (t) MS (c)       Row Name 09/08/23 1100          Transfer Goal 1 (PT)    Activity/Assistive Device (Transfer Goal 1, PT) sit-to-stand/stand-to-sit;bed-to-chair/chair-to-bed;walker, rolling  -MS (r) JS (t) MS (c)     [place] Level/Cues Needed (Transfer Goal 1, PT) moderate assist (50-74% patient effort)  -MS (r) JS (t) MS (c)     Time Frame (Transfer Goal 1, PT) long term goal (LTG);by discharge  -MS (r) JS (t) MS  "(c)     Progress/Outcome (Transfer Goal 1, PT) new goal  -MS (r) JS (t) MS (c)       Row Name 09/08/23 1100          Gait Training Goal 1 (PT)    Activity/Assistive Device (Gait Training Goal 1, PT) gait (walking locomotion);assistive device use;decrease fall risk;improve balance and speed;increase endurance/gait distance;walker, rolling  -MS (r) JS (t) MS (c)     [place] Level (Gait Training Goal 1, PT) standby assist  -MS (r) JS (t) MS (c)     Distance (Gait Training Goal 1, PT) 30  -MS (r) JS (t) MS (c)     Time Frame (Gait Training Goal 1, PT) long term goal (LTG);by discharge  -MS (r) JS (t) MS (c)     Progress/Outcome (Gait Training Goal 1, PT) new goal  -MS (r) JS (t) MS (c)       Row Name 09/08/23 1100          Problem Specific Goal 1 (PT)    Problem Specific Goal 1 (PT) Patient will demonstrate improved and safe posture marked by the ability to stand without significant weight on FWW without cueing  -MS (r) JS (t) MS (c)     Time Frame (Problem Specific Goal 1, PT) long-term goal (LTG);by discharge  -MS (r) JS (t) MS (c)     Progress/Outcome (Problem Specific Goal 1, PT) new goal  -MS (r) JS (t) MS (c)       Row Name 09/08/23 1100          Therapy Assessment/Plan (PT)    Planned Therapy Interventions (PT) balance training;bed mobility training;gait training;patient/family education;postural re-education;strengthening;transfer training  -MS (r) JS (t) MS (c)               User Key  (r) = Recorded By, (t) = Taken By, (c) = Cosigned By      Initials Name Provider Type    Merari Singh, PT, DPT, NCS Physical Therapist    Rick Diop, PT Student PT Student                   Clinical Impression       Row Name 09/08/23 1100          Pain    Pretreatment Pain Rating 0/10 - no pain  -MS (r) JS (t) MS (c)     Pain Location incisional  -MS (r) JS (t) MS (c)     Pain Location - abdomen  -MS (r) JS (t) MS (c)     Pre/Posttreatment Pain Comment Patient did not give me a number, but said she \"needed a " "pain pill to stand up.\"  -MS (r) JS (t) MS (c)     Pain Intervention(s) Medication (See MAR);Repositioned;Ambulation/increased activity  -MS (r) JS (t) MS (c)       Row Name 09/08/23 1100          Pain Scale: FACES Pre/Post-Treatment    Posttreatment Pain Rating 6-->hurts even more  -MS (r) JS (t) MS (c)     Pain Location incisional  -MS (r) JS (t) MS (c)       Row Name 09/08/23 1100          Plan of Care Review    Plan of Care Reviewed With patient  -MS (r) JS (t) MS (c)     Progress improving  -MS (r) JS (t) MS (c)     Outcome Evaluation PT eval completed. Pt is A&O to self only and confused. She complains of pain in abdomen during visit. She MMT shows gross BLE deficits in strength. BLE coordination is intact. Sensation is impaired in BLE distal to malloli. Balance is impaired, as she requires CGA for dynamic tasks in seated, and does not demonstrate the ability to stand unsupported. She requires min A  from FWW when standing, and her preferred posture is to lean on the device. She required cueing to stand up straight with the FWW. She transferred supine<>sit with mod A with use of bed rails for support. She transferred sit>stand with max A and required verbal and demonstrative cues to perform safely. She was able to ambulate 8 feet with min A with support of FWW and had a slow pattern with forward flexed posture, with cueing to correct. Pt fatigued and had to have chair pulled behind her after ambulation. She transferred once more from chair>bed with max A. Skilled PT necessary to address deficits in balance, strength, and functional mobility. Anticipate discharge to SNF.  -MS (r) DEBRA (t) MS (c)       Row Name 09/08/23 1100          Therapy Assessment/Plan (PT)    Patient/Family Therapy Goals Statement (PT) To return to normal function  -MS (r) JS (t) MS (c)     Rehab Potential (PT) good, to achieve stated therapy goals  -MS (r) JS (t) MS (c)     Criteria for Skilled Interventions Met (PT) yes;meets " criteria;skilled treatment is necessary  -MS (r) JS (t) MS (c)     Therapy Frequency (PT) 2 times/day  -MS (r) JS (t) MS (c)     Predicted Duration of Therapy Intervention (PT) Until discharge.  -MS (r) JS (t) MS (c)       Row Name 09/08/23 1100          Vital Signs    O2 Delivery Pre Treatment room air  -MS (r) JS (t) MS (c)     O2 Delivery Intra Treatment room air  -MS (r) JS (t) MS (c)     O2 Delivery Post Treatment room air  -MS (r) JS (t) MS (c)     Pre Patient Position Supine  -MS (r) JS (t) MS (c)     Intra Patient Position Standing  -MS (r) JS (t) MS (c)     Post Patient Position Supine  -MS (r) JS (t) MS (c)       Row Name 09/08/23 1100          Positioning and Restraints    Pre-Treatment Position in bed  -MS (r) JS (t) MS (c)     Post Treatment Position bed  -MS (r) JS (t) MS (c)     In Bed notified nsg;supine;call light within reach;encouraged to call for assist;exit alarm on;side rails up x2  -MS (r) JS (t) MS (c)               User Key  (r) = Recorded By, (t) = Taken By, (c) = Cosigned By      Initials Name Provider Type    Merari Singh, PT, DPT, NCS Physical Therapist    Rick Diop, HUNTER Student PT Student                   Outcome Measures       Row Name 09/08/23 1100 09/08/23 0800       How much help from another person do you currently need...    Turning from your back to your side while in flat bed without using bedrails? 3  -MS (r) JS (t) MS (c) 3  -DF    Moving from lying on back to sitting on the side of a flat bed without bedrails? 3  -MS (r) JS (t) MS (c) 3  -DF    Moving to and from a bed to a chair (including a wheelchair)? 2  -MS (r) JS (t) MS (c) 2  -DF    Standing up from a chair using your arms (e.g., wheelchair, bedside chair)? 2  -MS (r) JS (t) MS (c) 3  -DF    Climbing 3-5 steps with a railing? 1  -MS (r) JS (t) MS (c) 2  -DF    To walk in hospital room? 3  -MS (r) JS (t) MS (c) 2  -DF    AM-PAC 6 Clicks Score (PT) 14  -MS (r) JS (t) 15  -DF    Highest level of mobility 4  --> Transferred to chair/commode  -MS (r) JS (t) 4 --> Transferred to chair/commode  -DF      Row Name 09/08/23 1100          Functional Assessment    Outcome Measure Options AM-PAC 6 Clicks Basic Mobility (PT)  -MS (r) JS (t) MS (c)               User Key  (r) = Recorded By, (t) = Taken By, (c) = Cosigned By      Initials Name Provider Type    MS VillegasMerari R, PT, DPT, NCS Physical Therapist    Joana Mcfarlane RN Registered Nurse    Rick Diop, PT Student PT Student                                 Physical Therapy Education       Title: PT OT SLP Therapies (In Progress)       Topic: Physical Therapy (In Progress)       Point: Mobility training (Done)       Learning Progress Summary             Patient Acceptance, E,D, NR,NL,VU by DEBRA at 9/8/2023 1100    Comment: Benefits of weightbearing exercise/ambulation/repositioning. Cueing for proper transfers. Training for ambulation with AD.                         Point: Home exercise program (Not Started)       Learner Progress:  Not documented in this visit.              Point: Body mechanics (Done)       Learning Progress Summary             Patient Acceptance, E,D, NR,NL,VU by DEBRA at 9/8/2023 1100    Comment: Benefits of weightbearing exercise/ambulation/repositioning. Cueing for proper transfers. Training for ambulation with AD.                         Point: Precautions (Done)       Learning Progress Summary             Patient Acceptance, E,D, NR,NL,VU by DEBRA at 9/8/2023 1100    Comment: Benefits of weightbearing exercise/ambulation/repositioning. Cueing for proper transfers. Training for ambulation with AD.                                         User Key       Initials Effective Dates Name Provider Type Discipline     07/13/23 -  Rick Masters, PT Student PT Student PT                  PT Recommendation and Plan  Planned Therapy Interventions (PT): balance training, bed mobility training, gait training, patient/family education, postural  re-education, strengthening, transfer training  Plan of Care Reviewed With: patient  Progress: improving  Outcome Evaluation: PT eval completed. Pt is A&O to self only and confused. She complains of pain in abdomen during visit. She MMT shows gross BLE deficits in strength. BLE coordination is intact. Sensation is impaired in BLE distal to malloli. Balance is impaired, as she requires CGA for dynamic tasks in seated, and does not demonstrate the ability to stand unsupported. She requires min A  from FWW when standing, and her preferred posture is to lean on the device. She required cueing to stand up straight with the FWW. She transferred supine<>sit with mod A with use of bed rails for support. She transferred sit>stand with max A and required verbal and demonstrative cues to perform safely. She was able to ambulate 8 feet with min A with support of FWW and had a slow pattern with forward flexed posture, with cueing to correct. Pt fatigued and had to have chair pulled behind her after ambulation. She transferred once more from chair>bed with max A. Skilled PT necessary to address deficits in balance, strength, and functional mobility. Anticipate discharge to SNF.     Time Calculation:         PT Charges       Row Name 09/08/23 1100             Time Calculation    Start Time 1100  19 mins in chart review. 10 mins in room prior.  -MS (r) JS (t) MS (c)      Stop Time 1204  -MS (r) JS (t) MS (c)      Time Calculation (min) 64 min  -MS (r) JS (t)      PT Received On 09/08/23  -MS (r) JS (t) MS (c)      PT Goal Re-Cert Due Date 09/18/23  -MS (r) JS (t) MS (c)         Timed Charges    36567 - PT Therapeutic Activity Minutes 33  -MS (r) JS (t) MS (c)         Untimed Charges    PT Eval/Re-eval Minutes 60  -MS (r) JS (t) MS (c)         Total Minutes    Timed Charges Total Minutes 33  -MS (r) JS (t)      Untimed Charges Total Minutes 60  -MS (r) JS (t)       Total Minutes 93  -MS (r) JS (t)                User Key  (r) =  Recorded By, (t) = Taken By, (c) = Cosigned By      Initials Name Provider Type    Merari Singh, PT, DPT, NCS Physical Therapist    Rick Diop, PT Student PT Student                      PT G-Codes  Outcome Measure Options: AM-PAC 6 Clicks Basic Mobility (PT)  AM-PAC 6 Clicks Score (PT): 14  PT Discharge Summary  Anticipated Discharge Disposition (PT): skilled nursing facility    Rick Masters PT Student  9/8/2023

## 2023-09-08 NOTE — PROGRESS NOTES
"    [facility] Medicine Services  INPATIENT PROGRESS NOTE    Patient Name: Jennifer Gomes  Date of Admission: 9/4/2023  Today's Date: 09/08/23  Length of Stay: 4  Primary Care Physician: Olivia Mora APRN    Subjective   Chief Complaint: Abdominal pain  HPI   Ms. Gomes presented to [facility] emergency room 9/4/2023 with back pain.  EMS had been contacted after patient found down at home.  Patient was covered in stool and urine.  Patient reported she had been on the ground for \"a couple of days\".  She lives with her  and daughter and it is unknown how long patient had been on the floor.  WBC 20.6, potassium 3.1, creatinine 1.66, CK6 176, lactate 2.1, lipase 7, urinalysis too numerous to count WBC, 4+ bacteria, positive nitrate. Chest x-ray negative, CT abdomen pelvis shows large volume stool in rectum and distal colon with differential of acute colitis versus stercoral colitis. CT lumbar spine negative for fracture CT T-spine negative for fracture CT head negative for acute process, CT C-spine negative for fracture, x-ray left shoulder negative for fracture.  Lactated Ringer's fluid bolus, Rocephin given in ER.    Today  Lying in bed.  No oxygen in use.  No visitors in room.  Oxygen at 1 L with saturation 99% and oxygen removed.  Patient was taken to surgery yesterday afternoon by Dr. Arciniega for laparoscopic cholecystectomy.  Patient reports postoperative abdominal discomfort.  Denies nausea or vomiting.  No complaints of chest pain or palpitations.  She tells me she uses a walker at home and does not want to go to SNF.  Answers questions appropriately.  LFTs improving.  Bilirubin normal.  Consult physical therapy.  Patient does not want to go to SNF sires home at discharge.    Review of Systems   Constitutional:  Positive for fatigue. Negative for chills and fever.   HENT:  Negative for congestion and trouble swallowing.    Eyes:  Negative for photophobia " and visual disturbance.   Respiratory:  Negative for cough, shortness of breath and wheezing.    Cardiovascular:  Negative for chest pain, palpitations and leg swelling.   Gastrointestinal:  Positive for abdominal pain. Negative for vomiting.   Endocrine: Negative for cold intolerance, heat intolerance and polyuria.   Genitourinary:  Positive for urgency.   Musculoskeletal:  Positive for gait problem.   Skin:  Negative for color change, pallor, rash and wound.   Allergic/Immunologic: Negative for immunocompromised state.   Neurological:  Positive for weakness. Negative for light-headedness.   Hematological:  Negative for adenopathy. Does not bruise/bleed easily.   Psychiatric/Behavioral:  Negative for agitation, behavioral problems and confusion.       All pertinent negatives and positives are as above. All other systems have been reviewed and are negative unless otherwise stated.     Objective    Temp:  [97.5 °F (36.4 °C)-99 °F (37.2 °C)] 97.7 °F (36.5 °C)  Heart Rate:  [63-84] 64  Resp:  [16-23] 16  BP: (116-149)/(50-74) 134/54  Physical Exam  Vitals and nursing note reviewed.   Constitutional:       Comments: Lying in bed.  No oxygen in use.  No visitors in room.  Patient answers questions appropriately follows commands.   HENT:      Head: Normocephalic and atraumatic.      Nose: No congestion.      Mouth/Throat:      Pharynx: Oropharynx is clear. No oropharyngeal exudate or posterior oropharyngeal erythema.   Eyes:      Extraocular Movements: Extraocular movements intact.      Pupils: Pupils are equal, round, and reactive to light.   Cardiovascular:      Rate and Rhythm: Normal rate and regular rhythm.      Heart sounds: No murmur heard.     Comments: Sinus rhythm 69 on telemetry.  Pulmonary:      Breath sounds: No wheezing, rhonchi or rales.      Comments: No oxygen in use.  Abdominal:      Tenderness: There is abdominal tenderness (Surgical port sites).      Comments: Abdominal dressings dry and intact    Genitourinary:     Comments: Voiding  Musculoskeletal:         General: No swelling or tenderness.      Cervical back: Normal range of motion and neck supple.   Skin:     General: Skin is warm and dry.   Neurological:      General: No focal deficit present.      Mental Status: She is oriented to person, place, and time.   Psychiatric:         Mood and Affect: Mood normal.         Behavior: Behavior normal.     Results Review:  I have reviewed the labs, radiology results, and diagnostic studies.    Laboratory Data:   Results from last 7 days   Lab Units 09/08/23  0429 09/07/23  0506 09/06/23  0353   WBC 10*3/mm3 8.24 8.39 10.40   HEMOGLOBIN g/dL 7.7* 8.7* 7.4*   HEMATOCRIT % 24.9* 30.5* 24.2*   PLATELETS 10*3/mm3 154 148 151       Results from last 7 days   Lab Units 09/08/23  0429 09/07/23  0506 09/06/23  1307   SODIUM mmol/L 140 140 142   POTASSIUM mmol/L 3.5 3.4* 3.4*   CHLORIDE mmol/L 113* 112* 113*   CO2 mmol/L 19.0* 16.0* 19.0*   BUN mg/dL 18 25* 31*   CREATININE mg/dL 0.72 0.73 0.82   CALCIUM mg/dL 8.5* 9.1 8.7   BILIRUBIN mg/dL 0.3 0.6 1.3*   ALK PHOS U/L 290* 398* 413*   ALT (SGPT) U/L 267* 434* 501*   AST (SGOT) U/L 213* 531* 833*   GLUCOSE mg/dL 154* 120* 143*       Culture Data:   Blood Culture   Date Value Ref Range Status   09/04/2023 No growth at 3 days  Preliminary   09/04/2023 No growth at 3 days  Preliminary     Urine Culture   Date Value Ref Range Status   09/04/2023 >100,000 CFU/mL Escherichia coli (A)  Final     Escherichia coli       CHULA     Ampicillin >=32 Resistant     Ampicillin + Sulbactam >=32 Resistant     Cefazolin <=4 Susceptible     Cefepime <=1 Susceptible     Ceftazidime <=1 Susceptible     Ceftriaxone <=1 Susceptible     Gentamicin <=1 Susceptible     Levofloxacin <=0.12 Susceptible     Nitrofurantoin <=16 Susceptible     Piperacillin + Tazobactam <=4 Susceptible     Trimethoprim + Sulfamethoxazole <=20 Susceptible      Imaging Results (All)       Procedure Component Value Units  Date/Time    US Abdomen Limited [[id number]] Collected: 09/06/23 1113     Updated: 09/06/23 1120    Narrative:      US ABDOMEN LIMITED- 9/6/2023 9:24 AM CDT     REASON FOR EXAM: transaminitis; N30.00-Acute cystitis without hematuria;  M62.82-Rhabdomyolysis; N17.9-Acute kidney failure, unspecified;  I50.9-Heart failure, unspecified; R62.7-Adult failure to thrive;  W19.XXXA-Unspecified fall, initial encounter; R13.10-Dysphagia,  unspecified       COMPARISON: CT abdomen pelvis 9/4/2023.      TECHNIQUE: Multiple longitudinal and transverse realtime sonographic  images of the right upper quadrant of the abdomen are obtained.      FINDINGS:    Pancreas: Normal in size and echogenicity.      Liver: Normal in size, echogenicity and echotexture. No focal lesion.      Gallbladder: The gallbladder is mildly distended. No visualized stones.  Borderline gallbladder wall thickening measuring up to 3 mm. No  pericholecystic fluid..      Bile ducts: The CBD measures 0.7 cm in diameter. There is no  intrahepatic or extrahepatic ductal dilation.     Right kidney: Measures 9.2 cm in length. Normal in sonographic  appearance.     Other: No ascites.        Impression:         Mildly distended gallbladder with borderline gallbladder wall  thickening. Recommend correlation for early acute or chronic  cholecystitis. No visualized gallstones. No pericholecystic fluid. This  could be further evaluated with HIDA scan if clinically indicated.  This report was finalized on 09/06/2023 11:17 by  Sunny Degroot DO.    XR Shoulder 2+ View Left [[id number]] Collected: 09/04/23 1952     Updated: 09/04/23 1957    Narrative:      EXAM/TECHNIQUE: XR SHOULDER 2+ VW LEFT-     INDICATION: left shoulder pain     COMPARISON: None     FINDINGS:     Glenohumeral and acromioclavicular joints are maintained. No acute  fracture or dislocation. Mild AC joint osteophyte development. No  suspicious bone lesion. Included portion of the LEFT chest  appears  unremarkable. No acute soft tissue finding.       Impression:         No acute osseous findings.  This report was finalized on 09/04/2023 19:54 by Dr. Bakari Ramos MD.    CT Thoracic Spine Without Contrast [[id number]] Collected: 09/04/23 1859     Updated: 09/04/23 1905    Narrative:      EXAM/TECHNIQUE: CT thoracic spine without contrast     INDICATION: Spine fracture, thoracic, traumatic     COMPARISON: None     DLP: 963 mGy cm. Automated exposure control was also utilized to  decrease patient radiation dose.     FINDINGS:     Mild thoracic spine levocurvature, potentially related to patient  positioning. Thoracic kyphosis is maintained. No subluxations. Vertebral  body heights are maintained. No acute fracture. Mild multilevel thoracic  spine degenerative change. Included portion of the posterior lungs are  clear. No pneumothorax. Paravertebral soft tissues are unremarkable.  Esophagus is mildly patulous.       Impression:         1.  No acute fracture or subluxation.  2.  Mild multilevel cervical spine degenerative change.  This report was finalized on 09/04/2023 19:01 by Dr. Bakari Ramos MD.    CT Lumbar Spine Without Contrast [[id number]] Collected: 09/04/23 1852     Updated: 09/04/23 1901    Narrative:      EXAM/TECHNIQUE:  1. CT abdomen pelvis without contrast  2. CT lumbar spine without contrast     INDICATION: Fall, abdominal pain, back pain     COMPARISON: 02/08/2022     DLP: 252 mGy cm. Automated exposure control was also utilized to  decrease patient radiation dose.     FINDINGS:     CT abdomen pelvis without contrast:     Mild atelectasis in the included lung bases. The unenhanced liver,  gallbladder, adrenal glands, and pancreas are unremarkable. Gallbladder  is distended without evidence of wall thickening or gallstones. No large  renal lesion. No urolithiasis or hydronephrosis. No focal urinary  bladder abnormality.     Large volume stool in the rectum and distal colon. Long segment  wall  thickening of the sigmoid colon and distal descending colon with  surrounding fat stranding. A few scattered colonic diverticula are also  present within this region. The ascending and transverse colon appear  unremarkable. No secondary signs of appendicitis. No small bowel  distention or evidence of active small bowel inflammation.     No ascites or free pelvic fluid. No pelvic mass or pelvic collection.  Prior hysterectomy.     Normal caliber abdominal aorta with atherosclerotic calcification. No  enlarged abdominal or pelvic lymph nodes. No acute pelvic or hip  fracture. Sacrum appears intact. SI joints and pubic symphysis are  intact.     CT lumbar spine without contrast:     5 lumbar type vertebral bodies. Grade 1 anterolisthesis of L4 on L5,  likely degenerative in nature. Lumbar vertebral body heights are  maintained. No acute fracture. Moderate multilevel lumbar spine  degenerative change with multilevel central canal or neural foraminal  stenosis.       Impression:         1.  No acute traumatic finding in the abdomen or pelvis.     2.  No acute lumbar spine fracture.     3.  Large volume stool in the rectum and distal colon. Wall thickening  and inflammation of the sigmoid colon and distal descending colon,  compatible with acute colitis. This may be related to stercoral colitis  although differential also includes infectious colitis and acute  diverticulitis given the presence of small diverticula.     4.  Multilevel lumbar spine degenerative change and grade 1  anterolisthesis of L4 on L5 which is likely degenerative.     5.  Distended gallbladder without evidence of acute cholecystitis.  This report was finalized on 09/04/2023 18:58 by Dr. Bakari Ramos MD.    CT Abdomen Pelvis Without Contrast [[id number]] Collected: 09/04/23 1852     Updated: 09/04/23 1901    Narrative:      EXAM/TECHNIQUE:  1. CT abdomen pelvis without contrast  2. CT lumbar spine without contrast     INDICATION: Fall,  abdominal pain, back pain     COMPARISON: 02/08/2022     DLP: 252 mGy cm. Automated exposure control was also utilized to  decrease patient radiation dose.     FINDINGS:     CT abdomen pelvis without contrast:     Mild atelectasis in the included lung bases. The unenhanced liver,  gallbladder, adrenal glands, and pancreas are unremarkable. Gallbladder  is distended without evidence of wall thickening or gallstones. No large  renal lesion. No urolithiasis or hydronephrosis. No focal urinary  bladder abnormality.     Large volume stool in the rectum and distal colon. Long segment wall  thickening of the sigmoid colon and distal descending colon with  surrounding fat stranding. A few scattered colonic diverticula are also  present within this region. The ascending and transverse colon appear  unremarkable. No secondary signs of appendicitis. No small bowel  distention or evidence of active small bowel inflammation.     No ascites or free pelvic fluid. No pelvic mass or pelvic collection.  Prior hysterectomy.     Normal caliber abdominal aorta with atherosclerotic calcification. No  enlarged abdominal or pelvic lymph nodes. No acute pelvic or hip  fracture. Sacrum appears intact. SI joints and pubic symphysis are  intact.     CT lumbar spine without contrast:     5 lumbar type vertebral bodies. Grade 1 anterolisthesis of L4 on L5,  likely degenerative in nature. Lumbar vertebral body heights are  maintained. No acute fracture. Moderate multilevel lumbar spine  degenerative change with multilevel central canal or neural foraminal  stenosis.       Impression:         1.  No acute traumatic finding in the abdomen or pelvis.     2.  No acute lumbar spine fracture.     3.  Large volume stool in the rectum and distal colon. Wall thickening  and inflammation of the sigmoid colon and distal descending colon,  compatible with acute colitis. This may be related to stercoral colitis  although differential also includes infectious  colitis and acute  diverticulitis given the presence of small diverticula.     4.  Multilevel lumbar spine degenerative change and grade 1  anterolisthesis of L4 on L5 which is likely degenerative.     5.  Distended gallbladder without evidence of acute cholecystitis.  This report was finalized on 09/04/2023 18:58 by Dr. Bakari Ramos MD.    CT Cervical Spine Without Contrast [[id number]] Collected: 09/04/23 1844     Updated: 09/04/23 1852    Narrative:      EXAM/TECHNIQUE: CT cervical spine without contrast     INDICATION: Neck trauma (Age >= 65y)     COMPARISON: None     DLP: 367 mGy cm. Automated exposure control was also utilized to  decrease patient radiation dose.     FINDINGS:     Craniocervical relationships are maintained. No evidence of acute  odontoid process fracture. Corticated ossific fragment along the tip of  the odontoid may be related to old injury. Trace anterolisthesis of C4  on C5. Cervical spine alignment is otherwise maintained. Vertebral body  heights are maintained. No acute fracture. Facet joints are anatomically  aligned. Moderate multilevel cervical spine degenerative change with  multilevel neural foraminal stenosis, most pronounced at C5-C6.  Paravertebral soft tissues are unremarkable. Carotid artery  atherosclerotic calcifications are noted.       Impression:         1.  No acute osseous findings.  2.  Moderate multilevel cervical spine degenerative change, greatest at  C5-C6.  3.  Trace anterolisthesis of C4 on C5, likely degenerative in nature.  This report was finalized on 09/04/2023 18:49 by Dr. Bakari Ramos MD.    CT Head Without Contrast [[id number]] Collected: 09/04/23 1844     Updated: 09/04/23 1847    Narrative:      EXAM/TECHNIQUE: CT head without contrast     INDICATION: Head trauma, moderate-severe     COMPARISON: 02/08/2022     DLP: 697 mGy cm. Automated exposure control was also utilized to  decrease patient radiation dose.     FINDINGS:     No evidence of intracranial  hemorrhage. Gray-white differentiation is  maintained. Chronic microvascular ischemic white matter change and  global cerebral volume loss. Mild ex vacuo ventricular dilatation. No  midline shift or mass effect. Basilar cisterns are patent. No acute  orbital finding. Mastoid air cells are clear. Chronic RIGHT maxillary  sinus mucosal thickening and mucosal retention cyst. Motion artifact  limits evaluation of the skull base. No acute osseous finding.       Impression:         1.  No acute intracranial findings.  2.  Global cerebral volume loss and presumed chronic microvascular  ischemic white matter change.  This report was finalized on 09/04/2023 18:44 by Dr. Bakari Ramos MD.    XR Chest 1 View [[id number]] Collected: 09/04/23 1705     Updated: 09/04/23 1710    Narrative:      EXAM/TECHNIQUE: XR CHEST 1 VW-     INDICATION: Altered mental status     COMPARISON: 03/14/2023     FINDINGS:     Cardiac silhouette is normal in size. No pleural effusion, pneumothorax,  or focal consolidation. No acute osseous finding.       Impression:         No acute findings.  This report was finalized on 09/04/2023 17:07 by Dr. Bakari Ramos MD.           Scheduled medications  acetaminophen, 975 mg, Oral, Q8H  bisacodyl, 10 mg, Rectal, Daily  butalbital-acetaminophen-caffeine, 1 tablet, Oral, BID  carvedilol, 12.5 mg, Oral, BID With Meals  cefTRIAXone, 1,000 mg, Intravenous, Q24H  donepezil, 10 mg, Oral, Nightly  enoxaparin, 30 mg, Subcutaneous, Q12H  [START ON 9/9/2023] ferric gluconate, 125 mg, Intravenous, Once  lactated ringers, 1,000 mL, Intravenous, Once  levothyroxine, 50 mcg, Oral, QAM  metroNIDAZOLE, 500 mg, Intravenous, Q8H  pantoprazole, 40 mg, Oral, Q AM  polyethylene glycol, 17 g, Oral, Daily  potassium chloride, 20 mEq, Oral, Q4H  senna-docusate sodium, 2 tablet, Oral, BID  sertraline, 25 mg, Oral, Daily  sodium chloride, 10 mL, Intravenous, Q12H  sucralfate, 1 g, Oral, 4x Daily AC & at Bedtime  venlafaxine, 25 mg,  Oral, Daily      I have reviewed the patient's current medications.     Assessment/Plan   Assessment  Active Hospital Problems    Diagnosis     **E. coli UTI     Acute cholecystitis     TOMÁS (acute kidney injury)     Moderate malnutrition     Transaminitis     Colitis     Chronic pain syndrome     Chronic prescription opiate use     Hypothyroidism (acquired)     Essential hypertension     Non-traumatic rhabdomyolysis     Spinal stenosis, lumbar region, without neurogenic claudication     Gastroesophageal reflux disease      Treatment Plan  1.  Acute E. coli UTI, present on admission.  Urinalysis 13-20 WBC, 3+ bacteria.  Rocephin started on admission.  Urine culture positive for E. coli sensitive to Rocephin.  Continue Rocephin.  Blood cultures no growth x 3 days.  WBC 20.6 on admission down to 8.24 today.  Repeat CBC in AM.  Afebrile.    2.  Acute kidney injury.  Creatinine 1.66 on admission with baseline creatinine 0.81 on 3/14/2023. Hydrated with IV fluids.  Creatinine improved 0.72 today.  Repeat CMP in AM.2    3.  Nontraumatic rhabdomyolysis.  , 528, 150, 49.    4.  Acute cholecystitis with transaminitis.  AST and ALT normal on admission.  ,  on 9/6/2023.  Bilirubin 1.3 on 9/6.  Hepatitis panel negative.  Ultrasound gallbladder 9/6/2023 reported mildly distended gallbladder with borderline gallbladder wall thickening.  Recommend correlation for early acute on chronic cholecystitis.  No gallstones.  ,  bilirubin 0.6 on 9/7.  General surgery consulted and Dr. Arciniega took her for laparoscopic cholecystectomy and intraoperative cholangiogram on 9/7.  No drains postoperative.  Discussed with Dr. Arciniega today and advance diet as tolerated.  LFTs continue to improve.  ,  today. Repeat CMP in AM.  Ambulate in sanchez.  Lasix 20 mg IV x1 dose.  Statin, Xanax, Tylenol discontinued on 9/6.      5.  Stercoral colitis.  Flagyl started 9/5 and continues.  Bowel regimen.   Leukocytosis resolved.  Patient has had multiple bowel movement since surgery.    6.  Normocytic anemia.  Hemoglobin 9.6 on admission trended down to 7.4.  Suspect dilutional effect from IV fluids.  Hemoglobin 8.7 on 9/7.  Hemoglobin 7.7 today.  Repeat CBC in AM.  No signs of bright red bleeding per rectum or dark tarry stools noted.    7.  Chronic pain syndrome with chronic prescription opioid use. OxyContin    8.  Primary hypertension.  Blood pressure 134/54, 140/53.  Continue coreg.     9.  Hypokalemia.  Potassium 2.9 on 9/5.  Potassium replaced.  Potassium 3.5 today.  Replace potassium 40 mEq x 2 doses.  Repeat CMP in AM.    10.  Spinal stenosis, lumbar region without neurogenic claudication.  Consult physical therapy.  Patient sat on side of bed but did not ambulate today.    11.  GERD.  Continue pantoprazole daily.    12.  Hypothyroidism.  Continue Synthroid.    2.  SCDs for deep vein thrombosis prophylaxis.  Lovenox on hold.       Medical Decision Making  Number and Complexity of problems: 12  Acute E. coli UTI: Acute, high complexity, stable  Acute kidney injury: Acute, high complexity, stable, improving, at baseline  Nontraumatic rhabdomyolysis, acute, high complexity, stable  Acute cholecystitis with transaminitis: Acute, high complexity posing threat to life and bodily function requiring general surgery consultation and surgical intervention, improving  Stercoral colitis: Acute, moderate complexity, stable  Normocytic anemia: Acute on chronic, moderate complexity stable  Primary hypertension: Chronic, moderate complexity, stable  Chronic pain syndrome: Chronic, moderate complexity, stable  Hypokalemia: Acute, high complexity, improving  Spinal stenosis: Chronic, moderate complexity, stable  GERD: Chronic, low complexity, stable  Hypothyroidism: Chronic, low complexity, stable    Differential Diagnosis: None    Conditions and Status   Patient is unchanged     MDM Data  External documents reviewed:  Gastroenterology office visit 8/24/2023  Cardiac tracing (EKG, telemetry) interpretation: Normal sinus rhythm 79 on telemetry  Radiology interpretation: Reviewed radiology interpretation ultrasound gallbladder on 9/7/2023.  Labs reviewed:   CMP 9/8/2023.  Repeat CMP in AM.  CBC 9/8/2023.  Repeat CBC in AM.  Blood cultures no growth at 3 days  Urine culture E. coli    Any tests that were considered but not ordered: None     Decision rules/scores evaluated (example [id number], [place], etc): None     Discussed with: Dr. Kent, Dr. Arciniega general surgery and patient.      Care Planning  Shared decision making: Dr. Kent, Dr. Arciniega general surgery and patient.  Patient agrees to [place] diet, physical therapy consultation.  Ambulation as tolerated  Code status and discussions: Full code  Patient surrogate decision maker is her , Adams and daughter Lola.    Disposition  Social Determinants of Health that impact treatment or disposition: None  I expect the patient to be discharged to home with home health in 1-3 days.     Electronically signed by MORGAN Ramirez, 09/08/23, 13:06 CDT.

## 2023-09-08 NOTE — PLAN OF CARE
Goal Outcome Evaluation:  Plan of Care Reviewed With: patient        Progress: improving  Outcome Evaluation: PT eval completed. Pt is A&O to self only and confused. She complains of pain in abdomen during visit. She MMT shows gross BLE deficits in strength. BLE coordination is intact. Sensation is impaired in BLE distal to malloli. Balance is impaired, as she requires CGA for dynamic tasks in seated, and does not demonstrate the ability to stand unsupported. She requires min A  from FWW when standing, and her preferred posture is to lean on the device. She required cueing to stand up straight with the FWW. She transferred supine<>sit with mod A with use of bed rails for support. She transferred sit>stand with max A and required verbal and demonstrative cues to perform safely. She was able to ambulate 8 feet with min A with support of FWW and had a slow pattern with forward flexed posture, with cueing to correct. Pt fatigued and had to have chair pulled behind her after ambulation. She transferred once more from chair>bed with max A. Skilled PT necessary to address deficits in balance, strength, and functional mobility. Anticipate discharge to SNF.      Anticipated Discharge Disposition (PT): skilled nursing facility

## 2023-09-08 NOTE — PROGRESS NOTES
LOS: 4 days   Patient Care Team:  Olivia Mroa APRN as PCP - General (Nurse Practitioner)  [facility]  Eleazar Ramires MD as Cardiologist (Cardiology)  Shasta Madrigal MD as Referring Physician (Obstetrics and Gynecology)  Holly Chavez MD as Consulting Physician (General Surgery)    Chief Complaint: Acute and chronic cholecystitis    Subjective     Subjective     Postoperative day 1 status post laparoscopic cholecystectomy for treatment of acute on chronic cholecystitis, she is feeling better, she is increasing her appetite, she feels much improved.  Her liver functions are reducing, her fluid status is slightly up, we will treat with Lasix, also reduced hematocrit we will also give iron.  Objective      Objective     Vital Signs  Temp:  [97.5 °F (36.4 °C)-99 °F (37.2 °C)] 97.5 °F (36.4 °C)  Heart Rate:  [63-84] 63  Resp:  [16-23] 16  BP: (116-149)/(41-74) 140/53    Intake & Output (last 3 days)         09/05 0701  09/06 0700 09/06 0701  09/07 0700 09/07 0701  09/08 0700 09/08 0701  09/09 0700    P.O. 40  0     I.V. (mL/kg)  1000 (13.2) 1965 (26)     IV Piggyback 200 300 450     Total Intake(mL/kg) 240 (3.2) 1300 (17.2) 2415 (32)     Urine (mL/kg/hr) 900 (0.5) 1150 (0.6) 400 (0.2)     Stool  0 0     Total Output 900 1150 400     Net -660 +150 +2015             Urine Unmeasured Occurrence 1 x 1 x 2 x     Stool Unmeasured Occurrence  1 x 3 x             Physical Exam:     General Appearance:    Alert, cooperative, in no acute distress   Lungs:     Clear to auscultation, respirations regular, even and                  unlabored    Heart:    Regular rhythm and normal rate, normal S1 and S2, no            murmur, no gallop, no rub, no click   Chest Wall:    No abnormalities observed   Abdomen:   Soft, normal bowel sounds, dressings are dry, no infection, liver functions are about half of what they were preoperatively, bilirubin reduced 2.3, alkaline phosphatase down to 290 from 413.         Results Review:     I reviewed the patient's new clinical results.  I reviewed the patient's new imaging results and agree with the interpretation.    Results from last 7 days   Lab Units 09/08/23  0429 09/07/23  0506 09/06/23  0353   WBC 10*3/mm3 8.24 8.39 10.40   HEMOGLOBIN g/dL 7.7* 8.7* 7.4*   HEMATOCRIT % 24.9* 30.5* 24.2*   PLATELETS 10*3/mm3 154 148 151        Results from last 7 days   Lab Units 09/08/23  0429 09/07/23  0506 09/06/23  1307   SODIUM mmol/L 140 140 142   POTASSIUM mmol/L 3.5 3.4* 3.4*   CHLORIDE mmol/L 113* 112* 113*   CO2 mmol/L 19.0* 16.0* 19.0*   BUN mg/dL 18 25* 31*   CREATININE mg/dL 0.72 0.73 0.82   CALCIUM mg/dL 8.5* 9.1 8.7   BILIRUBIN mg/dL 0.3 0.6 1.3*   ALK PHOS U/L 290* 398* 413*   ALT (SGPT) U/L 267* 434* 501*   AST (SGOT) U/L 213* 531* 833*   GLUCOSE mg/dL 154* 120* 143*       Assessment/Plan     Assessment & Plan       E. coli UTI    Gastroesophageal reflux disease    Spinal stenosis, lumbar region, without neurogenic claudication    Non-traumatic rhabdomyolysis    Chronic pain syndrome    Chronic prescription opiate use    Hypothyroidism (acquired)    Essential hypertension    TOMÁS (acute kidney injury)    Colitis    Moderate malnutrition    Transaminitis    Acute cholecystitis      Patient with the above problems, case discussed with the Steff Bowen nurse practitioner, we will look toward diuresis, replace iron, replace KCl, ask physical therapy to see have a walker full liquid diet today advance diet potentially home on Saturday or probably Sunday.    [] X-Ray  [] Reviewed Records  [] Called Physician/Consulted    Gerardo Arciniega MD  09/08/23  07:49 CDT      Time: time spent with patient 15 minutes     Part of this note may be an electronic transcription/translation of spoken language to printed text using the Dragon Dictation System.

## 2023-09-09 LAB
ALBUMIN SERPL-MCNC: 2.4 G/DL (ref 3.5–5.2)
ALBUMIN/GLOB SERPL: 0.8 G/DL
ALP SERPL-CCNC: 240 U/L (ref 39–117)
ALT SERPL W P-5'-P-CCNC: 180 U/L (ref 1–33)
ANION GAP SERPL CALCULATED.3IONS-SCNC: 10 MMOL/L (ref 5–15)
AST SERPL-CCNC: 82 U/L (ref 1–32)
BACTERIA SPEC AEROBE CULT: NORMAL
BACTERIA SPEC AEROBE CULT: NORMAL
BILIRUB SERPL-MCNC: 0.2 MG/DL (ref 0–1.2)
BUN SERPL-MCNC: 14 MG/DL (ref 8–23)
BUN/CREAT SERPL: 23.3 (ref 7–25)
CALCIUM SPEC-SCNC: 8.9 MG/DL (ref 8.6–10.5)
CHLORIDE SERPL-SCNC: 113 MMOL/L (ref 98–107)
CO2 SERPL-SCNC: 19 MMOL/L (ref 22–29)
CREAT SERPL-MCNC: 0.6 MG/DL (ref 0.57–1)
DEPRECATED RDW RBC AUTO: 54.6 FL (ref 37–54)
EGFRCR SERPLBLD CKD-EPI 2021: 90.9 ML/MIN/1.73
ERYTHROCYTE [DISTWIDTH] IN BLOOD BY AUTOMATED COUNT: 15.4 % (ref 12.3–15.4)
GLOBULIN UR ELPH-MCNC: 3 GM/DL
GLUCOSE SERPL-MCNC: 107 MG/DL (ref 65–99)
HCT VFR BLD AUTO: 30.2 % (ref 34–46.6)
HGB BLD-MCNC: 9.2 G/DL (ref 12–15.9)
MAGNESIUM SERPL-MCNC: 1.6 MG/DL (ref 1.6–2.4)
MCH RBC QN AUTO: 29.6 PG (ref 26.6–33)
MCHC RBC AUTO-ENTMCNC: 30.5 G/DL (ref 31.5–35.7)
MCV RBC AUTO: 97.1 FL (ref 79–97)
PLATELET # BLD AUTO: 197 10*3/MM3 (ref 140–450)
PMV BLD AUTO: 11.1 FL (ref 6–12)
POTASSIUM SERPL-SCNC: 3.1 MMOL/L (ref 3.5–5.2)
PROT SERPL-MCNC: 5.4 G/DL (ref 6–8.5)
RBC # BLD AUTO: 3.11 10*6/MM3 (ref 3.77–5.28)
SODIUM SERPL-SCNC: 142 MMOL/L (ref 136–145)
WBC NRBC COR # BLD: 10.65 10*3/MM3 (ref 3.4–10.8)

## 2023-09-09 PROCEDURE — 25010000002 NA FERRIC GLUC CPLX PER 12.5 MG: Performed by: SPECIALIST

## 2023-09-09 PROCEDURE — 97535 SELF CARE MNGMENT TRAINING: CPT

## 2023-09-09 PROCEDURE — 25010000002 ENOXAPARIN PER 10 MG: Performed by: SPECIALIST

## 2023-09-09 PROCEDURE — 85027 COMPLETE CBC AUTOMATED: CPT | Performed by: SPECIALIST

## 2023-09-09 PROCEDURE — 83735 ASSAY OF MAGNESIUM: CPT | Performed by: NURSE PRACTITIONER

## 2023-09-09 PROCEDURE — 25010000002 MAGNESIUM SULFATE 2 GM/50ML SOLUTION: Performed by: NURSE PRACTITIONER

## 2023-09-09 PROCEDURE — 25010000002 METRONIDAZOLE 500 MG/100ML SOLUTION: Performed by: SPECIALIST

## 2023-09-09 PROCEDURE — 80053 COMPREHEN METABOLIC PANEL: CPT | Performed by: SPECIALIST

## 2023-09-09 RX ORDER — ASPIRIN 81 MG/1
81 TABLET, CHEWABLE ORAL DAILY
COMMUNITY

## 2023-09-09 RX ORDER — POTASSIUM CHLORIDE 750 MG/1
40 CAPSULE, EXTENDED RELEASE ORAL ONCE
Status: COMPLETED | OUTPATIENT
Start: 2023-09-09 | End: 2023-09-09

## 2023-09-09 RX ORDER — ENOXAPARIN SODIUM 100 MG/ML
30 INJECTION SUBCUTANEOUS EVERY 24 HOURS
Status: DISCONTINUED | OUTPATIENT
Start: 2023-09-10 | End: 2023-09-10 | Stop reason: HOSPADM

## 2023-09-09 RX ORDER — METRONIDAZOLE 500 MG/1
500 TABLET ORAL EVERY 8 HOURS SCHEDULED
Status: DISCONTINUED | OUTPATIENT
Start: 2023-09-09 | End: 2023-09-10 | Stop reason: HOSPADM

## 2023-09-09 RX ORDER — MAGNESIUM SULFATE HEPTAHYDRATE 40 MG/ML
2 INJECTION, SOLUTION INTRAVENOUS ONCE
Status: COMPLETED | OUTPATIENT
Start: 2023-09-09 | End: 2023-09-09

## 2023-09-09 RX ADMIN — DONEPEZIL HYDROCHLORIDE 10 MG: 10 TABLET, FILM COATED ORAL at 20:15

## 2023-09-09 RX ADMIN — VENLAFAXINE 25 MG: 50 TABLET ORAL at 08:27

## 2023-09-09 RX ADMIN — BUTALBITAL, ACETAMINOPHEN, AND CAFFEINE 1 TABLET: 50; 325; 40 TABLET ORAL at 08:25

## 2023-09-09 RX ADMIN — SODIUM CHLORIDE 125 MG: 9 INJECTION, SOLUTION INTRAVENOUS at 10:09

## 2023-09-09 RX ADMIN — POTASSIUM CHLORIDE 40 MEQ: 10 CAPSULE, COATED, EXTENDED RELEASE ORAL at 08:24

## 2023-09-09 RX ADMIN — POTASSIUM CHLORIDE 40 MEQ: 10 CAPSULE, COATED, EXTENDED RELEASE ORAL at 17:05

## 2023-09-09 RX ADMIN — METRONIDAZOLE 500 MG: 500 TABLET ORAL at 17:06

## 2023-09-09 RX ADMIN — SUCRALFATE 1 G: 1 TABLET ORAL at 17:06

## 2023-09-09 RX ADMIN — BUTALBITAL, ACETAMINOPHEN, AND CAFFEINE 1 TABLET: 50; 325; 40 TABLET ORAL at 20:15

## 2023-09-09 RX ADMIN — SUCRALFATE 1 G: 1 TABLET ORAL at 20:15

## 2023-09-09 RX ADMIN — CARVEDILOL 12.5 MG: 6.25 TABLET, FILM COATED ORAL at 17:06

## 2023-09-09 RX ADMIN — OXYCODONE HYDROCHLORIDE 5 MG: 5 TABLET ORAL at 11:00

## 2023-09-09 RX ADMIN — ACETAMINOPHEN 975 MG: 325 TABLET, FILM COATED ORAL at 06:15

## 2023-09-09 RX ADMIN — ENOXAPARIN SODIUM 30 MG: 100 INJECTION SUBCUTANEOUS at 06:14

## 2023-09-09 RX ADMIN — LEVOTHYROXINE SODIUM 50 MCG: 50 TABLET ORAL at 08:26

## 2023-09-09 RX ADMIN — METRONIDAZOLE 500 MG: 500 INJECTION, SOLUTION INTRAVENOUS at 08:32

## 2023-09-09 RX ADMIN — Medication 10 ML: at 20:15

## 2023-09-09 RX ADMIN — SUCRALFATE 1 G: 1 TABLET ORAL at 11:49

## 2023-09-09 RX ADMIN — SUCRALFATE 1 G: 1 TABLET ORAL at 08:26

## 2023-09-09 RX ADMIN — SERTRALINE HYDROCHLORIDE 25 MG: 50 TABLET, FILM COATED ORAL at 08:26

## 2023-09-09 RX ADMIN — POTASSIUM CHLORIDE 40 MEQ: 10 CAPSULE, COATED, EXTENDED RELEASE ORAL at 20:15

## 2023-09-09 RX ADMIN — METRONIDAZOLE 500 MG: 500 INJECTION, SOLUTION INTRAVENOUS at 00:47

## 2023-09-09 RX ADMIN — MAGNESIUM SULFATE HEPTAHYDRATE 2 G: 2 INJECTION, SOLUTION INTRAVENOUS at 11:49

## 2023-09-09 RX ADMIN — CARVEDILOL 12.5 MG: 6.25 TABLET, FILM COATED ORAL at 08:26

## 2023-09-09 RX ADMIN — PANTOPRAZOLE SODIUM 40 MG: 40 TABLET, DELAYED RELEASE ORAL at 06:15

## 2023-09-09 NOTE — PLAN OF CARE
Goal Outcome Evaluation:  Plan of Care Reviewed With: patient        Progress: improving  Outcome Evaluation: Medicated with scheduled tylenol for c/o pain, voiding per [facility], up w assist x2, lap x3 g/t C/D/I, IV abx as ordered, SCD's, tele NSR, rm air, BM x2 this shift, bed check on, disoriented to situation.

## 2023-09-09 NOTE — PLAN OF CARE
Goal Outcome Evaluation:  Plan of Care Reviewed With: patient        Progress: no change  Outcome Evaluation: IV abx continue. Encourage patient to increase activity. dressing is dry and intact. Safety maintained.

## 2023-09-09 NOTE — PROGRESS NOTES
"    [facility] Medicine Services  INPATIENT PROGRESS NOTE    Patient Name: Jennifer Gomes  Date of Admission: 9/4/2023  Today's Date: 09/09/23  Length of Stay: 5  Primary Care Physician: Olivia Mora APRN    Subjective   Chief Complaint: Abdominal pain  HPI   Ms. Gomes presented to [facility] emergency room 9/4/2023 with back pain.  EMS had been contacted after patient found down at home.  Patient was covered in stool and urine.  Patient reported she had been on the ground for \"a couple of days\".  She lives with her  and daughter and it is unknown how long patient had been on the floor.  WBC 20.6, potassium 3.1, creatinine 1.66, CK6 176, lactate 2.1, lipase 7, urinalysis too numerous to count WBC, 4+ bacteria, positive nitrate. Chest x-ray negative, CT abdomen pelvis shows large volume stool in rectum and distal colon with differential of acute colitis versus stercoral colitis. CT lumbar spine negative for fracture CT T-spine negative for fracture CT head negative for acute process, CT C-spine negative for fracture, x-ray left shoulder negative for fracture.  Lactated Ringer's fluid bolus, Rocephin given in ER.    Today  Lying in bed.  No oxygen in use.  No visitors in room.  Patient having loose stools.  Dr. Arciniega did manual disimpaction at the time of surgery.  Abdominal laparoscopic incisions clean and dry.  No complaints of nausea or vomiting.  Tolerating diet.  No palpitations or chest pain.  Patient uses a walker at home and takes a few steps according to nurse Brianna after speaking to her daughter Lola today.  LFTs continue to trend down, AST 82, .  Physical therapy again with patient and she sat on the side of the bed and took a few steps.  Patient declines SNF and wants to go home at discharge.    Review of Systems   Constitutional:  Positive for fatigue. Negative for chills and fever.   HENT:  Negative for congestion and trouble " swallowing.    Eyes:  Negative for photophobia and visual disturbance.   Respiratory:  Negative for cough, shortness of breath and wheezing.    Cardiovascular:  Negative for chest pain, palpitations and leg swelling.   Gastrointestinal:  Negative for abdominal pain and vomiting.   Endocrine: Negative for cold intolerance, heat intolerance and polyuria.   Genitourinary:  Positive for urgency.   Musculoskeletal:  Positive for gait problem.   Skin:  Negative for color change, pallor, rash and wound.   Allergic/Immunologic: Negative for immunocompromised state.   Neurological:  Positive for weakness. Negative for light-headedness.   Hematological:  Negative for adenopathy. Does not bruise/bleed easily.   Psychiatric/Behavioral:  Negative for agitation, behavioral problems and confusion.       All pertinent negatives and positives are as above. All other systems have been reviewed and are negative unless otherwise stated.     Objective    Temp:  [97.6 °F (36.4 °C)-98.4 °F (36.9 °C)] 98.3 °F (36.8 °C)  Heart Rate:  [60-74] 74  Resp:  [16] 16  BP: (138-155)/(52-85) 155/67  Physical Exam  Vitals and nursing note reviewed.   Constitutional:       Comments: Lying in bed.  No oxygen in use.  No visitors in room.  Patient answers questions appropriately, follows commands.   HENT:      Head: Normocephalic and atraumatic.      Nose: No congestion.      Mouth/Throat:      Pharynx: Oropharynx is clear. No oropharyngeal exudate or posterior oropharyngeal erythema.   Eyes:      Extraocular Movements: Extraocular movements intact.      Pupils: Pupils are equal, round, and reactive to light.   Cardiovascular:      Rate and Rhythm: Normal rate and regular rhythm.      Heart sounds: No murmur heard.     Comments: Sinus rhythm 62-83 on telemetry.  Pulmonary:      Breath sounds: No wheezing, rhonchi or rales.      Comments: No oxygen in use.  Abdominal:      Tenderness: There is abdominal tenderness (Surgical port sites).      Comments:  Abdominal dressings dry and intact   Genitourinary:     Comments: Voiding  Musculoskeletal:         General: No swelling or tenderness.      Cervical back: Normal range of motion and neck supple.   Skin:     General: Skin is warm and dry.   Neurological:      General: No focal deficit present.      Mental Status: She is oriented to person, place, and time.   Psychiatric:         Mood and Affect: Mood normal.         Behavior: Behavior normal.     Results Review:  I have reviewed the labs, radiology results, and diagnostic studies.    Laboratory Data:   Results from last 7 days   Lab Units 09/09/23 0420 09/08/23 0429 09/07/23  0506   WBC 10*3/mm3 10.65 8.24 8.39   HEMOGLOBIN g/dL 9.2* 7.7* 8.7*   HEMATOCRIT % 30.2* 24.9* 30.5*   PLATELETS 10*3/mm3 197 154 148     Results from last 7 days   Lab Units 09/09/23 0420 09/08/23 0429 09/07/23  0506   SODIUM mmol/L 142 140 140   POTASSIUM mmol/L 3.1* 3.5 3.4*   CHLORIDE mmol/L 113* 113* 112*   CO2 mmol/L 19.0* 19.0* 16.0*   BUN mg/dL 14 18 25*   CREATININE mg/dL 0.60 0.72 0.73   CALCIUM mg/dL 8.9 8.5* 9.1   BILIRUBIN mg/dL 0.2 0.3 0.6   ALK PHOS U/L 240* 290* 398*   ALT (SGPT) U/L 180* 267* 434*   AST (SGOT) U/L 82* 213* 531*   GLUCOSE mg/dL 107* 154* 120*     Culture Data:   Blood Culture   Date Value Ref Range Status   09/04/2023 No growth at 4 days  Preliminary   09/04/2023 No growth at 4 days  Preliminary     Urine Culture   Date Value Ref Range Status   09/04/2023 >100,000 CFU/mL Escherichia coli (A)  Final     Escherichia coli       CHULA     Ampicillin >=32 Resistant     Ampicillin + Sulbactam >=32 Resistant     Cefazolin <=4 Susceptible     Cefepime <=1 Susceptible     Ceftazidime <=1 Susceptible     Ceftriaxone <=1 Susceptible     Gentamicin <=1 Susceptible     Levofloxacin <=0.12 Susceptible     Nitrofurantoin <=16 Susceptible     Piperacillin + Tazobactam <=4 Susceptible     Trimethoprim + Sulfamethoxazole <=20 Susceptible      Imaging Results (All)        Procedure Component Value Units Date/Time    US Abdomen Limited [[id number]] Collected: 09/06/23 1113     Updated: 09/06/23 1120    Narrative:      US ABDOMEN LIMITED- 9/6/2023 9:24 AM CDT     REASON FOR EXAM: transaminitis; N30.00-Acute cystitis without hematuria;  M62.82-Rhabdomyolysis; N17.9-Acute kidney failure, unspecified;  I50.9-Heart failure, unspecified; R62.7-Adult failure to thrive;  W19.XXXA-Unspecified fall, initial encounter; R13.10-Dysphagia,  unspecified       COMPARISON: CT abdomen pelvis 9/4/2023.      TECHNIQUE: Multiple longitudinal and transverse realtime sonographic  images of the right upper quadrant of the abdomen are obtained.      FINDINGS:    Pancreas: Normal in size and echogenicity.      Liver: Normal in size, echogenicity and echotexture. No focal lesion.      Gallbladder: The gallbladder is mildly distended. No visualized stones.  Borderline gallbladder wall thickening measuring up to 3 mm. No  pericholecystic fluid..      Bile ducts: The CBD measures 0.7 cm in diameter. There is no  intrahepatic or extrahepatic ductal dilation.     Right kidney: Measures 9.2 cm in length. Normal in sonographic  appearance.     Other: No ascites.        Impression:         Mildly distended gallbladder with borderline gallbladder wall  thickening. Recommend correlation for early acute or chronic  cholecystitis. No visualized gallstones. No pericholecystic fluid. This  could be further evaluated with HIDA scan if clinically indicated.  This report was finalized on 09/06/2023 11:17 by  Sunny Degroot DO.    XR Shoulder 2+ View Left [[id number]] Collected: 09/04/23 1952     Updated: 09/04/23 1957    Narrative:      EXAM/TECHNIQUE: XR SHOULDER 2+ VW LEFT-     INDICATION: left shoulder pain     COMPARISON: None     FINDINGS:     Glenohumeral and acromioclavicular joints are maintained. No acute  fracture or dislocation. Mild AC joint osteophyte development. No  suspicious bone lesion. Included portion of the  LEFT chest appears  unremarkable. No acute soft tissue finding.       Impression:         No acute osseous findings.  This report was finalized on 09/04/2023 19:54 by Dr. Bakari Ramos MD.    CT Thoracic Spine Without Contrast [[id number]] Collected: 09/04/23 1859     Updated: 09/04/23 1905    Narrative:      EXAM/TECHNIQUE: CT thoracic spine without contrast     INDICATION: Spine fracture, thoracic, traumatic     COMPARISON: None     DLP: 963 mGy cm. Automated exposure control was also utilized to  decrease patient radiation dose.     FINDINGS:     Mild thoracic spine levocurvature, potentially related to patient  positioning. Thoracic kyphosis is maintained. No subluxations. Vertebral  body heights are maintained. No acute fracture. Mild multilevel thoracic  spine degenerative change. Included portion of the posterior lungs are  clear. No pneumothorax. Paravertebral soft tissues are unremarkable.  Esophagus is mildly patulous.       Impression:         1.  No acute fracture or subluxation.  2.  Mild multilevel cervical spine degenerative change.  This report was finalized on 09/04/2023 19:01 by Dr. Bakari Ramos MD.    CT Lumbar Spine Without Contrast [[id number]] Collected: 09/04/23 1852     Updated: 09/04/23 1901    Narrative:      EXAM/TECHNIQUE:  1. CT abdomen pelvis without contrast  2. CT lumbar spine without contrast     INDICATION: Fall, abdominal pain, back pain     COMPARISON: 02/08/2022     DLP: 252 mGy cm. Automated exposure control was also utilized to  decrease patient radiation dose.     FINDINGS:     CT abdomen pelvis without contrast:     Mild atelectasis in the included lung bases. The unenhanced liver,  gallbladder, adrenal glands, and pancreas are unremarkable. Gallbladder  is distended without evidence of wall thickening or gallstones. No large  renal lesion. No urolithiasis or hydronephrosis. No focal urinary  bladder abnormality.     Large volume stool in the rectum and distal colon. Long  segment wall  thickening of the sigmoid colon and distal descending colon with  surrounding fat stranding. A few scattered colonic diverticula are also  present within this region. The ascending and transverse colon appear  unremarkable. No secondary signs of appendicitis. No small bowel  distention or evidence of active small bowel inflammation.     No ascites or free pelvic fluid. No pelvic mass or pelvic collection.  Prior hysterectomy.     Normal caliber abdominal aorta with atherosclerotic calcification. No  enlarged abdominal or pelvic lymph nodes. No acute pelvic or hip  fracture. Sacrum appears intact. SI joints and pubic symphysis are  intact.     CT lumbar spine without contrast:     5 lumbar type vertebral bodies. Grade 1 anterolisthesis of L4 on L5,  likely degenerative in nature. Lumbar vertebral body heights are  maintained. No acute fracture. Moderate multilevel lumbar spine  degenerative change with multilevel central canal or neural foraminal  stenosis.       Impression:         1.  No acute traumatic finding in the abdomen or pelvis.     2.  No acute lumbar spine fracture.     3.  Large volume stool in the rectum and distal colon. Wall thickening  and inflammation of the sigmoid colon and distal descending colon,  compatible with acute colitis. This may be related to stercoral colitis  although differential also includes infectious colitis and acute  diverticulitis given the presence of small diverticula.     4.  Multilevel lumbar spine degenerative change and grade 1  anterolisthesis of L4 on L5 which is likely degenerative.     5.  Distended gallbladder without evidence of acute cholecystitis.  This report was finalized on 09/04/2023 18:58 by Dr. Bakari Ramos MD.    CT Abdomen Pelvis Without Contrast [[id number]] Collected: 09/04/23 1852     Updated: 09/04/23 1901    Narrative:      EXAM/TECHNIQUE:  1. CT abdomen pelvis without contrast  2. CT lumbar spine without contrast     INDICATION: Fall,  abdominal pain, back pain     COMPARISON: 02/08/2022     DLP: 252 mGy cm. Automated exposure control was also utilized to  decrease patient radiation dose.     FINDINGS:     CT abdomen pelvis without contrast:     Mild atelectasis in the included lung bases. The unenhanced liver,  gallbladder, adrenal glands, and pancreas are unremarkable. Gallbladder  is distended without evidence of wall thickening or gallstones. No large  renal lesion. No urolithiasis or hydronephrosis. No focal urinary  bladder abnormality.     Large volume stool in the rectum and distal colon. Long segment wall  thickening of the sigmoid colon and distal descending colon with  surrounding fat stranding. A few scattered colonic diverticula are also  present within this region. The ascending and transverse colon appear  unremarkable. No secondary signs of appendicitis. No small bowel  distention or evidence of active small bowel inflammation.     No ascites or free pelvic fluid. No pelvic mass or pelvic collection.  Prior hysterectomy.     Normal caliber abdominal aorta with atherosclerotic calcification. No  enlarged abdominal or pelvic lymph nodes. No acute pelvic or hip  fracture. Sacrum appears intact. SI joints and pubic symphysis are  intact.     CT lumbar spine without contrast:     5 lumbar type vertebral bodies. Grade 1 anterolisthesis of L4 on L5,  likely degenerative in nature. Lumbar vertebral body heights are  maintained. No acute fracture. Moderate multilevel lumbar spine  degenerative change with multilevel central canal or neural foraminal  stenosis.       Impression:         1.  No acute traumatic finding in the abdomen or pelvis.     2.  No acute lumbar spine fracture.     3.  Large volume stool in the rectum and distal colon. Wall thickening  and inflammation of the sigmoid colon and distal descending colon,  compatible with acute colitis. This may be related to stercoral colitis  although differential also includes infectious  colitis and acute  diverticulitis given the presence of small diverticula.     4.  Multilevel lumbar spine degenerative change and grade 1  anterolisthesis of L4 on L5 which is likely degenerative.     5.  Distended gallbladder without evidence of acute cholecystitis.  This report was finalized on 09/04/2023 18:58 by Dr. Bakari Ramos MD.    CT Cervical Spine Without Contrast [[id number]] Collected: 09/04/23 1844     Updated: 09/04/23 1852    Narrative:      EXAM/TECHNIQUE: CT cervical spine without contrast     INDICATION: Neck trauma (Age >= 65y)     COMPARISON: None     DLP: 367 mGy cm. Automated exposure control was also utilized to  decrease patient radiation dose.     FINDINGS:     Craniocervical relationships are maintained. No evidence of acute  odontoid process fracture. Corticated ossific fragment along the tip of  the odontoid may be related to old injury. Trace anterolisthesis of C4  on C5. Cervical spine alignment is otherwise maintained. Vertebral body  heights are maintained. No acute fracture. Facet joints are anatomically  aligned. Moderate multilevel cervical spine degenerative change with  multilevel neural foraminal stenosis, most pronounced at C5-C6.  Paravertebral soft tissues are unremarkable. Carotid artery  atherosclerotic calcifications are noted.       Impression:         1.  No acute osseous findings.  2.  Moderate multilevel cervical spine degenerative change, greatest at  C5-C6.  3.  Trace anterolisthesis of C4 on C5, likely degenerative in nature.  This report was finalized on 09/04/2023 18:49 by Dr. Bakari Ramos MD.    CT Head Without Contrast [[id number]] Collected: 09/04/23 1844     Updated: 09/04/23 1847    Narrative:      EXAM/TECHNIQUE: CT head without contrast     INDICATION: Head trauma, moderate-severe     COMPARISON: 02/08/2022     DLP: 697 mGy cm. Automated exposure control was also utilized to  decrease patient radiation dose.     FINDINGS:     No evidence of intracranial  hemorrhage. Gray-white differentiation is  maintained. Chronic microvascular ischemic white matter change and  global cerebral volume loss. Mild ex vacuo ventricular dilatation. No  midline shift or mass effect. Basilar cisterns are patent. No acute  orbital finding. Mastoid air cells are clear. Chronic RIGHT maxillary  sinus mucosal thickening and mucosal retention cyst. Motion artifact  limits evaluation of the skull base. No acute osseous finding.       Impression:         1.  No acute intracranial findings.  2.  Global cerebral volume loss and presumed chronic microvascular  ischemic white matter change.  This report was finalized on 09/04/2023 18:44 by Dr. Bakari Ramos MD.    XR Chest 1 View [[id number]] Collected: 09/04/23 1705     Updated: 09/04/23 1710    Narrative:      EXAM/TECHNIQUE: XR CHEST 1 VW-     INDICATION: Altered mental status     COMPARISON: 03/14/2023     FINDINGS:     Cardiac silhouette is normal in size. No pleural effusion, pneumothorax,  or focal consolidation. No acute osseous finding.       Impression:         No acute findings.  This report was finalized on 09/04/2023 17:07 by Dr. Bakari Ramos MD.           Scheduled medications  acetaminophen, 975 mg, Oral, Q8H  bisacodyl, 10 mg, Rectal, Daily  butalbital-acetaminophen-caffeine, 1 tablet, Oral, BID  carvedilol, 12.5 mg, Oral, BID With Meals  donepezil, 10 mg, Oral, Nightly  [START ON 9/10/2023] enoxaparin, 30 mg, Subcutaneous, Q24H  lactated ringers, 1,000 mL, Intravenous, Once  levothyroxine, 50 mcg, Oral, QAM  metroNIDAZOLE, 500 mg, Oral, Q8H  pantoprazole, 40 mg, Oral, Q AM  polyethylene glycol, 17 g, Oral, Daily  potassium chloride, 40 mEq, Oral, Once  potassium chloride, 40 mEq, Oral, Once  senna-docusate sodium, 2 tablet, Oral, BID  sertraline, 25 mg, Oral, Daily  sodium chloride, 10 mL, Intravenous, Q12H  sucralfate, 1 g, Oral, 4x Daily AC & at Bedtime  venlafaxine, 25 mg, Oral, Daily      I have reviewed the patient's current  medications.     Assessment/Plan   Assessment  Active Hospital Problems    Diagnosis     **E. coli UTI     Acute cholecystitis     TOMÁS (acute kidney injury)     Moderate malnutrition     Transaminitis     Colitis     Chronic pain syndrome     Chronic prescription opiate use     Hypothyroidism (acquired)     Essential hypertension     Non-traumatic rhabdomyolysis     Spinal stenosis, lumbar region, without neurogenic claudication     Gastroesophageal reflux disease      Treatment Plan  1.  Acute E. coli UTI, present on admission.  Urinalysis 13-20 WBC, 3+ bacteria.  Rocephin started on admission.  Urine culture positive for E. coli sensitive to Rocephin.  Continue Rocephin.  Blood cultures no growth x 4 days.  WBC 20.6 on admission down to 10.65 today.  Repeat CBC in AM.  Afebrile.    2.  Acute kidney injury.  Creatinine 1.66 on admission with baseline creatinine 0.81 on 3/14/2023. Hydrated with IV fluids.  Creatinine improved 0.6 today.  Repeat CMP in AM.    3.  Nontraumatic rhabdomyolysis.  , 528, 150, 49.    4.  Acute cholecystitis with transaminitis.  AST and ALT normal on admission.  ,  on 9/6/2023.  Bilirubin 1.3 on 9/6.  Hepatitis panel negative.  Ultrasound gallbladder 9/6/2023 reported mildly distended gallbladder with borderline gallbladder wall thickening.  Recommend correlation for early acute on chronic cholecystitis.  No gallstones.  ,  bilirubin 0.6 on 9/7.  General surgery consulted and Dr. Arciniega took her for laparoscopic cholecystectomy and intraoperative cholangiogram on 9/7.  No drains postoperative.  Discussed with Dr. Arciniega 9/8 and advance diet as tolerated.  LFTs continue to improve.  AST 82  today. Repeat CMP in AM.  Ambulate as tolerated.  Statin, Xanax, Tylenol discontinued on 9/6.      5.  Stercoral colitis.  Flagyl started 9/5 and continues.  Bowel regimen.  Leukocytosis resolved.  Patient has had multiple bowel movement since surgery.    6.   Normocytic anemia.  Hemoglobin 9.6 on admission trended down to 7.4.  Suspect dilutional effect from IV fluids.  Hemoglobin 8.7 on 9/7.  Hemoglobin 7.7 on 9/8.  Hemoglobin 9.2 today.  Repeat CBC in AM.  No dark tarry stools or bright red bleeding per rectum.    7.  Chronic pain syndrome with chronic prescription opioid use. OxyContin    8.  Primary hypertension.  Blood pressure 155/67.  Continue coreg.     9.  Hypokalemia.  Potassium 2.9 on 9/5.  Potassium replaced.  Potassium 3.1 today.  Replace potassium 40 mEq x 2 doses.  Magnesium 1.6 today.  Replace magnesium 2 g IV x1 dose.  Repeat magnesium level in AM.    10.  Spinal stenosis, lumbar region without neurogenic claudication.  Consult physical therapy.  Patient sat on side of bed but did not ambulate today.    11.  GERD.  Continue pantoprazole daily.    12.  Hypothyroidism.  Continue Synthroid.    13.  Lovenox and SCDs for deep vein thrombosis prophylaxis.      Medical Decision Making  Number and Complexity of problems: 12  Acute E. coli UTI: Acute, high complexity, stable  Acute kidney injury: Acute, high complexity, stable, improving, at baseline  Nontraumatic rhabdomyolysis, acute, high complexity, stable  Acute cholecystitis with transaminitis: Acute, high complexity posing threat to life and bodily function requiring general surgery consultation and surgical intervention, improving  Stercoral colitis: Acute, moderate complexity, stable  Normocytic anemia: Acute on chronic, moderate complexity stable  Primary hypertension: Chronic, moderate complexity, stable  Chronic pain syndrome: Chronic, moderate complexity, stable  Hypokalemia: Acute, high complexity, worsening, not at baseline.  Spinal stenosis: Chronic, moderate complexity, stable  GERD: Chronic, low complexity, stable  Hypothyroidism: Chronic, low complexity, stable    Differential Diagnosis: None    Conditions and Status   Patient is unchanged     MDM Data  External documents reviewed:  Gastroenterology office visit 8/24/2023  Cardiac tracing (EKG, telemetry) interpretation: Normal sinus rhythm 79 on telemetry  Radiology interpretation: Reviewed radiology interpretation ultrasound gallbladder on 9/7/2023.  Labs reviewed:   CMP 9/9/2023.  Repeat CMP in AM.  CBC 9/9/2023.  Repeat CBC in AM.  Magnesium level 1.6 on 9/9.  Repeat magnesium in AM.  Blood cultures no growth at 4 days  Urine culture E. coli    Any tests that were considered but not ordered: None     Decision rules/scores evaluated (example [id number], [place], etc): None     Discussed with: Dr. Kent, Dr. Arciniega general surgery and patient.      Care Planning  Shared decision making: Dr. Kent, Dr. Arciniega general surgery and patient.  Patient agrees to advancing diet, physical therapy consultation.  Ambulation as tolerated  Code status and discussions: Full code  Patient surrogate decision maker is her , Adams and daughter Lola.    Disposition  Social Determinants of Health that impact treatment or disposition: None  I expect the patient to be discharged to home with home health in 1-2 days.     Electronically signed by MORGAN Ramirez, 09/09/23, 15:44 CDT.

## 2023-09-09 NOTE — PROGRESS NOTES
LOS: 5 days   Patient Care Team:  Olivia Mora APRN as PCP - General (Nurse Practitioner)  [facility]  Eleazar Ramires MD as Cardiologist (Cardiology)  Shasta Madrigal MD as Referring Physician (Obstetrics and Gynecology)  Holly Chavez MD as Consulting Physician (General Surgery)    Chief Complaint: Acute and chronic cholecystitis    Subjective     Subjective     Postoperative day 2 status post laparoscopic cholecystectomy for treatment of acute on chronic cholecystitis, she is feeling better, she is increasing her appetite, she feels much improved.      Objective      Objective     Vital Signs  Temp:  [97.7 °F (36.5 °C)-98.4 °F (36.9 °C)] 97.7 °F (36.5 °C)  Heart Rate:  [64-71] 71  Resp:  [16] 16  BP: (134-146)/(54-85) 138/67    Intake & Output (last 3 days)         09/06 0701 09/07 0700 09/07 0701 09/08 0700 09/08 0701  09/09 0700 09/09 0701  09/10 0700    P.O.  0 620     I.V. (mL/kg) 1000 (13.2) 1965 (26)      IV Piggyback 300 450      Total Intake(mL/kg) 1300 (17.2) 2415 (32) 620 (8.2)     Urine (mL/kg/hr) 1150 (0.6) 400 (0.2)      Stool 0 0      Total Output 1150 400      Net +150 +2015 +620             Urine Unmeasured Occurrence 1 x 2 x 4 x     Stool Unmeasured Occurrence 1 x 3 x 4 x             Physical Exam:     General Appearance:    Alert, cooperative, in no acute distress   Lungs:     Clear to auscultation, respirations regular, even and                  unlabored    Heart:    Regular rhythm and normal rate, normal S1 and S2, no            murmur, no gallop, no rub, no click   Chest Wall:    No abnormalities observed   Abdomen:   Soft, normal bowel sounds, dressings are dry, no infection,           Results Review:     I reviewed the patient's new clinical results.  I reviewed the patient's new imaging results and agree with the interpretation.    Results from last 7 days   Lab Units 09/09/23  0420 09/08/23  0429 09/07/23  0506   WBC 10*3/mm3 10.65 8.24 8.39   HEMOGLOBIN  g/dL 9.2* 7.7* 8.7*   HEMATOCRIT % 30.2* 24.9* 30.5*   PLATELETS 10*3/mm3 197 154 148          Results from last 7 days   Lab Units 09/09/23  0420 09/08/23  0429 09/07/23  0506   SODIUM mmol/L 142 140 140   POTASSIUM mmol/L 3.1* 3.5 3.4*   CHLORIDE mmol/L 113* 113* 112*   CO2 mmol/L 19.0* 19.0* 16.0*   BUN mg/dL 14 18 25*   CREATININE mg/dL 0.60 0.72 0.73   CALCIUM mg/dL 8.9 8.5* 9.1   BILIRUBIN mg/dL 0.2 0.3 0.6   ALK PHOS U/L 240* 290* 398*   ALT (SGPT) U/L 180* 267* 434*   AST (SGOT) U/L 82* 213* 531*   GLUCOSE mg/dL 107* 154* 120*         Assessment/Plan     Assessment & Plan       E. coli UTI    Gastroesophageal reflux disease    Spinal stenosis, lumbar region, without neurogenic claudication    Non-traumatic rhabdomyolysis    Chronic pain syndrome    Chronic prescription opiate use    Hypothyroidism (acquired)    Essential hypertension    TOMÁS (acute kidney injury)    Colitis    Moderate malnutrition    Transaminitis    Acute cholecystitis  Plan to advance the patient's diet as tolerated.  With respect to discharge and follow-up with general surgery services as needed.      Abdullahi Pearson MD  09/09/23  09:27 CDT

## 2023-09-09 NOTE — THERAPY TREATMENT NOTE
Acute Care - Occupational Therapy Treatment Note  [facility]     Patient Name: Jennifer Gomes  : 1940  MRN: [id number]  Today's Date: 2023             Admit Date: 2023       ICD-10-CM ICD-9-CM   1. Acute cystitis without hematuria  N30.00 595.0   2. Non-traumatic rhabdomyolysis  M62.82 728.88   3. TOMÁS (acute kidney injury)  N17.9 584.9   4. Acute congestive heart failure, unspecified heart failure type  I50.9 428.0   5. Failure to thrive in adult  R62.7 783.7   6. Fall, initial encounter  W19.XXXA E888.9   7. Dysphagia, unspecified type  R13.10 787.20   8. Acute cholecystitis  K81.0 575.0   9. Cholecystitis  K81.9 575.10   10. Impaired mobility [Z74.09 (ICD-10-CM)]  Z74.09 799.89   11. Decreased activities of daily living (ADL) [Z78.9 (ICD-10-CM)]  Z78.9 V49.89     Patient Active Problem List   Diagnosis    Gastroesophageal reflux disease    Spinal stenosis, lumbar region, without neurogenic claudication    Overweight    Non-smoker    Erosion of vaginal mesh    Adenocarcinoma of endometrium    Encounter for consultation    S/P hysterectomy with oophorectomy    Encounter for follow-up surveillance of endometrial cancer    History of radiation therapy    Obesity (BMI 30-39.9)    Non-traumatic rhabdomyolysis    Metabolic encephalopathy    Acute renal failure superimposed on stage 3 chronic kidney disease    Acute respiratory failure with hypoxia and hypercapnia    Medical non-compliance, does not take narcotics as prescribed    SIRS (systemic inflammatory response syndrome)    Chronic constipation    Chronic pain syndrome    Chronic prescription opiate use    Normocytic anemia    Hypothyroidism (acquired)    Essential hypertension    TOMÁS (acute kidney injury)    Venous insufficiency of both lower extremities    Fluid retention    Low blood pressure reading    Obesity, Class III, BMI 40-49.9 (morbid obesity)    Chronic intractable headache    RAFAL (obstructive sleep apnea)    Change in bowel habits     Altered mental status    Toxic metabolic encephalopathy    Polypharmacy    Frequent falls    Grade III internal hemorrhoids    External hemorrhoids    E. coli UTI    Colitis    Moderate malnutrition    Transaminitis    Acute cholecystitis     Past Medical History:   Diagnosis Date    Anxiety     Arthritis     Bronchitis     Cancer     uterine    Chronic pain     Depression     Disease of thyroid gland     Fibromyalgia     GERD (gastroesophageal reflux disease)     Headache     History of transfusion     AS     Hyperlipidemia     Hypertension     Incontinence     Insomnia     Leg pain     Lumbar stenosis     Migraines     Peptic ulcer     Restless legs     Sleep apnea     NO C-PAP    UTI (urinary tract infection)     Vaginal bleeding      Past Surgical History:   Procedure Laterality Date    BLADDER REPAIR      MESH HAD TO BE REMOVED IN     BREAST BIOPSY Right 2017    benign    BREAST CYST EXCISION Left     CARDIAC CATHETERIZATION      CARPAL TUNNEL RELEASE      CATARACT EXTRACTION W/ INTRAOCULAR LENS  IMPLANT, BILATERAL      CHOLECYSTECTOMY WITH INTRAOPERATIVE CHOLANGIOGRAM N/A 2023    Procedure: CHOLECYSTECTOMY LAPAROSCOPIC INTRAOPERATIVE CHOLANGIOGRAM;  Surgeon: Gerardo Arciniega MD;  Location: [facility] OR;  Service: General;  Laterality: N/A;    COLONOSCOPY      COLONOSCOPY N/A 10/01/2021    Procedure: COLONOSCOPY WITH ANESTHESIA;  Surgeon: Tom Velasco DO;  Location:  [facility] ENDOSCOPY;  Service: Gastroenterology;  Laterality: N/A;  pre: change in bowel habits  post: diverticulosis. hemorrhoids.   Olivia Mora APRN        CYSTECTOMY      D & C HYSTEROSCOPY N/A 2017    Procedure: DILATATION AND CURETTAGE HYSTEROSCOPY;  Surgeon: Shasta Madrigal MD;  Location: [facility] OR;  Service:     DILATION AND CURETTAGE, DIAGNOSTIC / THERAPEUTIC      ENDOSCOPY  2010    Short segment of Arriola's,Moderate chroninc esophagogastritis and negative H.pylori    ENDOSCOPY N/A 2017     Procedure: ESOPHAGOGASTRODUODENOSCOPY WITH ANESTHESIA;  Surgeon: Tom Velasco DO;  Location: [facility] ENDOSCOPY;  Service:     EYE SURGERY      RETINA    HEMORRHOIDECTOMY SIGMOIDOSCOPY N/A 3/21/2023    Procedure: HEMORRHOIDECTOMY WITH EXAM UNDER ANESTHESIA;  Surgeon: Holly Chavez MD;  Location: [facility] OR;  Service: General;  Laterality: N/A;    HYSTERECTOMY  12/20/2017    ORIF TIBIA/FIBULA FRACTURES Left 2000    TRANSVAGINAL TAPING SUSPENSION N/A 11/06/2017    Procedure: VAGINAL MESH REVISION;  Surgeon: Shasta Madrigal MD;  Location:  [facility] OR;  Service:     VAGINAL MESH REVISION  2013         OT ASSESSMENT FLOWSHEET (last 12 hours)       OT Evaluation and Treatment       Row Name 09/09/23 1519                   OT Time and Intention    Subjective Information complains of;weakness;fatigue  -TS        Document Type therapy note (daily note)  -TS        Mode of Treatment occupational therapy  -TS        Patient Effort good  -TS           General Information    Existing Precautions/Restrictions fall  -TS           Pain Assessment    Pretreatment Pain Rating 0/10 - no pain  -TS        Posttreatment Pain Rating 0/10 - no pain  -TS           Cognition    Personal Safety Interventions fall prevention program maintained;gait belt;nonskid shoes/slippers when out of bed  -TS           Activities of Daily Living    BADL Assessment/Intervention lower body dressing;toileting  -TS           Lower Body Dressing Assessment/Training    [place] Level (Lower Body Dressing) don;pants/bottoms;maximum assist (25% patient effort)  -TS        Position (Lower Body Dressing) unsupported sitting;supported standing  -TS           Toileting Assessment/Training    [place] Level (Toileting) toileting skills;set up;perform perineal hygiene;moderate assist (50% patient effort);adjust/manage clothing;maximum assist (25% patient effort)  -TS        Assistive Devices (Toileting) commode, bedside without drop arms  -TS         Position (Toileting) supported sitting;supported standing  -TS           Bed Mobility    Supine-Sit [place] (Bed Mobility) standby assist  -TS        Assistive Device (Bed Mobility) bed rails;draw sheet  -TS           Functional Mobility    Functional Mobility- Ind. Level contact guard assist  -TS        Functional Mobility- Device walker, front-wheeled  -TS           Transfer Assessment/Treatment    Transfers sit-stand transfer;stand-sit transfer;toilet transfer  -TS           Sit-Stand Transfer    Sit-Stand [place] (Transfers) contact guard  -TS        Assistive Device (Sit-Stand Transfers) walker, front-wheeled  -TS           Stand-Sit Transfer    Stand-Sit [place] (Transfers) contact guard  -TS        Assistive Device (Stand-Sit Transfers) walker, front-wheeled  -TS           Toilet Transfer    Type (Toilet Transfer) sit-stand;stand-sit  -TS        [place] Level (Toilet Transfer) contact guard  -TS        Assistive Device (Toilet Transfer) commode, bedside without drop arms;walker, front-wheeled  -TS           Wound 09/07/23 1341 abdomen Incision    Wound - Properties Group Placement Date: 09/07/23  -ZE Placement Time: 1341  -ZE Present on Hospital Admission: N  -ZE Location: abdomen  -ZE Primary Wound Type: Incision  -ZE    Retired Wound - Properties Group Placement Date: 09/07/23  -ZE Placement Time: 1341  -ZE Present on Hospital Admission: N  -ZE Location: abdomen  -ZE Primary Wound Type: Incision  -ZE    Retired Wound - Properties Group Date first assessed: 09/07/23  -ZE Time first assessed: 1341  -ZE Present on Hospital Admission: N  -ZE Location: abdomen  -ZE Primary Wound Type: Incision  -ZE       Plan of Care Review    Plan of Care Reviewed With patient  -TS        Progress improving  -TS        Outcome Evaluation Pt CGA/min A for transfers and ambulating short distances with RW. Pt mod/max A for LB dressing and hygiene post toileting due to bowel movement. Pt would benefit from  continued rehab prior to returning home due to pt having decreased strength and endurance. She is at great risk of falling if she chooses to return home without discharging to rehab first. Continue OT POC  -TS           Positioning and Restraints    Pre-Treatment Position in bed  -TS        Post Treatment Position chair  -TS        In Chair sitting;call light within reach;encouraged to call for assist;notified nsg  -TS                  User Key  (r) = Recorded By, (t) = Taken By, (c) = Cosigned By      Initials Name Effective Dates    TS Lisa Gamez COTA 02/03/23 -     Aubrey Littlejohn RN 02/17/22 -                      Occupational Therapy Education       Title: PT OT SLP Therapies (In Progress)       Topic: Occupational Therapy (In Progress)       Point: ADL training (Done)       Description:   Instruct learner(s) on proper safety adaptation and remediation techniques during self care or transfers.   Instruct in proper use of assistive devices.                  Learning Progress Summary             Patient Acceptance, E,D, VU,NR by  at 9/6/2023 1017                         Point: Home exercise program (Not Started)       Description:   Instruct learner(s) on appropriate technique for monitoring, assisting and/or progressing therapeutic exercises/activities.                  Learner Progress:  Not documented in this visit.              Point: Precautions (Not Started)       Description:   Instruct learner(s) on prescribed precautions during self-care and functional transfers.                  Learner Progress:  Not documented in this visit.              Point: Body mechanics (Done)       Description:   Instruct learner(s) on proper positioning and spine alignment during self-care, functional mobility activities and/or exercises.                  Learning Progress Summary             Patient Acceptance, E,D, VU,NR by  at 9/6/2023 1017                                         User Key        Initials Effective Dates Name Provider Type Discipline     07/11/23 -  Jennifer Briceno, OTR/L Occupational Therapist OT                      OT Recommendation and Plan     Plan of Care Review  Plan of Care Reviewed With: patient  Progress: improving  Outcome Evaluation: Pt CGA/min A for transfers and ambulating short distances with RW. Pt mod/max A for LB dressing and hygiene post toileting due to bowel movement. Pt would benefit from continued rehab prior to returning home due to pt having decreased strength and endurance. She is at great risk of falling if she chooses to return home without discharging to rehab first. Continue OT POC  Plan of Care Reviewed With: patient  Outcome Evaluation: Pt CGA/min A for transfers and ambulating short distances with RW. Pt mod/max A for LB dressing and hygiene post toileting due to bowel movement. Pt would benefit from continued rehab prior to returning home due to pt having decreased strength and endurance. She is at great risk of falling if she chooses to return home without discharging to rehab first. Continue OT POC        Time Calculation:    Time Calculation- OT       Row Name 09/09/23 1614             Time Calculation- OT    OT Start Time 1519  -TS      OT Stop Time 1610  -TS      OT Time Calculation (min) 51 min  -TS      Total Timed Code Minutes- OT 51 minute(s)  -TS      OT Received On 09/09/23  -TS         Timed Charges    16068 - OT Self Care/Mgmt Minutes 51  -TS         Total Minutes    Timed Charges Total Minutes 51  -TS       Total Minutes 51  -TS                User Key  (r) = Recorded By, (t) = Taken By, (c) = Cosigned By      Initials Name Provider Type    TS Lisa Gamez COTA Occupational Therapist Assistant                  Therapy Charges for Today       Code Description Service Date Service Provider Modifiers Qty    25027136673 HC OT SELF CARE/MGMT/TRAIN EA 15 MIN 9/8/2023 Lisa Gamez COTA GO 3    19232865044 HC OT SELF CARE/MGMT/TRAIN  EA 15 MIN 9/9/2023 Lisa Gamez, ZAKIA GO 3                 Lisa CABEZAS. ZAKIA Gamez  9/9/2023

## 2023-09-09 NOTE — PLAN OF CARE
Goal Outcome Evaluation:  Plan of Care Reviewed With: patient        Progress: improving  Outcome Evaluation: Pt CGA/min A for transfers and ambulating short distances with RW. Pt mod/max A for LB dressing and hygiene post toileting due to bowel movement. Pt would benefit from continued rehab prior to returning home due to pt having decreased strength and endurance. She is at great risk of falling if she chooses to return home without discharging to rehab first. Continue OT POC

## 2023-09-10 ENCOUNTER — READMISSION MANAGEMENT (OUTPATIENT)
Dept: CALL CENTER | Facility: HOSPITAL | Age: 83
End: 2023-09-10
Payer: MEDICARE

## 2023-09-10 VITALS
DIASTOLIC BLOOD PRESSURE: 70 MMHG | HEART RATE: 70 BPM | TEMPERATURE: 97.8 F | HEIGHT: 62 IN | BODY MASS INDEX: 30.63 KG/M2 | WEIGHT: 166.45 LBS | RESPIRATION RATE: 16 BRPM | SYSTOLIC BLOOD PRESSURE: 134 MMHG | OXYGEN SATURATION: 98 %

## 2023-09-10 LAB
ALBUMIN SERPL-MCNC: 2.6 G/DL (ref 3.5–5.2)
ALBUMIN/GLOB SERPL: 0.8 G/DL
ALP SERPL-CCNC: 231 U/L (ref 39–117)
ALT SERPL W P-5'-P-CCNC: 120 U/L (ref 1–33)
ANION GAP SERPL CALCULATED.3IONS-SCNC: 10 MMOL/L (ref 5–15)
AST SERPL-CCNC: 41 U/L (ref 1–32)
BILIRUB SERPL-MCNC: 0.4 MG/DL (ref 0–1.2)
BUN SERPL-MCNC: 11 MG/DL (ref 8–23)
BUN/CREAT SERPL: 16.4 (ref 7–25)
CALCIUM SPEC-SCNC: 8.9 MG/DL (ref 8.6–10.5)
CHLORIDE SERPL-SCNC: 111 MMOL/L (ref 98–107)
CO2 SERPL-SCNC: 21 MMOL/L (ref 22–29)
CREAT SERPL-MCNC: 0.67 MG/DL (ref 0.57–1)
EGFRCR SERPLBLD CKD-EPI 2021: 88.5 ML/MIN/1.73
GLOBULIN UR ELPH-MCNC: 3.2 GM/DL
GLUCOSE SERPL-MCNC: 118 MG/DL (ref 65–99)
MAGNESIUM SERPL-MCNC: 2.4 MG/DL (ref 1.6–2.4)
POTASSIUM SERPL-SCNC: 3.8 MMOL/L (ref 3.5–5.2)
PROT SERPL-MCNC: 5.8 G/DL (ref 6–8.5)
SODIUM SERPL-SCNC: 142 MMOL/L (ref 136–145)

## 2023-09-10 PROCEDURE — 80053 COMPREHEN METABOLIC PANEL: CPT | Performed by: SPECIALIST

## 2023-09-10 PROCEDURE — 25010000002 ENOXAPARIN PER 10 MG: Performed by: NURSE PRACTITIONER

## 2023-09-10 PROCEDURE — 83735 ASSAY OF MAGNESIUM: CPT | Performed by: NURSE PRACTITIONER

## 2023-09-10 PROCEDURE — 97535 SELF CARE MNGMENT TRAINING: CPT

## 2023-09-10 RX ORDER — METRONIDAZOLE 500 MG/1
500 TABLET ORAL EVERY 8 HOURS SCHEDULED
Qty: 9 TABLET | Refills: 0 | Status: SHIPPED | OUTPATIENT
Start: 2023-09-10 | End: 2023-09-13

## 2023-09-10 RX ORDER — POTASSIUM CHLORIDE 750 MG/1
40 CAPSULE, EXTENDED RELEASE ORAL ONCE
Status: COMPLETED | OUTPATIENT
Start: 2023-09-10 | End: 2023-09-10

## 2023-09-10 RX ADMIN — VENLAFAXINE 25 MG: 50 TABLET ORAL at 08:41

## 2023-09-10 RX ADMIN — METRONIDAZOLE 500 MG: 500 TABLET ORAL at 05:18

## 2023-09-10 RX ADMIN — POTASSIUM CHLORIDE 40 MEQ: 10 CAPSULE, COATED, EXTENDED RELEASE ORAL at 08:39

## 2023-09-10 RX ADMIN — BUTALBITAL, ACETAMINOPHEN, AND CAFFEINE 1 TABLET: 50; 325; 40 TABLET ORAL at 08:40

## 2023-09-10 RX ADMIN — METRONIDAZOLE 500 MG: 500 TABLET ORAL at 00:21

## 2023-09-10 RX ADMIN — SUCRALFATE 1 G: 1 TABLET ORAL at 08:40

## 2023-09-10 RX ADMIN — PANTOPRAZOLE SODIUM 40 MG: 40 TABLET, DELAYED RELEASE ORAL at 05:18

## 2023-09-10 RX ADMIN — METRONIDAZOLE 500 MG: 500 TABLET ORAL at 14:17

## 2023-09-10 RX ADMIN — HYDROCODONE BITARTRATE AND ACETAMINOPHEN 1 TABLET: 7.5; 325 TABLET ORAL at 14:17

## 2023-09-10 RX ADMIN — LEVOTHYROXINE SODIUM 50 MCG: 50 TABLET ORAL at 06:53

## 2023-09-10 RX ADMIN — ACETAMINOPHEN 975 MG: 325 TABLET, FILM COATED ORAL at 05:18

## 2023-09-10 RX ADMIN — CARVEDILOL 12.5 MG: 6.25 TABLET, FILM COATED ORAL at 08:40

## 2023-09-10 RX ADMIN — ENOXAPARIN SODIUM 30 MG: 100 INJECTION SUBCUTANEOUS at 05:18

## 2023-09-10 RX ADMIN — SERTRALINE HYDROCHLORIDE 25 MG: 50 TABLET, FILM COATED ORAL at 08:41

## 2023-09-10 NOTE — OUTREACH NOTE
Prep Survey      Flowsheet Row Responses   [facility] facility patient discharged from? [facility]   Is LACE score < 7 ? No   Eligibility Readm Mgmt   Discharge diagnosis **E. coli UTI   Acute cholecystitisLaparoscopic cholecystectomy and intraoperative cholangiogram 9/7/2023 Dr. Arciniega   Does the patient have one of the following disease processes/diagnoses(primary or secondary)? General Surgery   Does the patient have Home health ordered? Yes   What is the Home health agency?  [facility]   Is there a DME ordered? No   Prep survey completed? Yes            ALDO RUSSO - Registered Nurse

## 2023-09-10 NOTE — PLAN OF CARE
Goal Outcome Evaluation:  Plan of Care Reviewed With: patient        Progress: improving  Outcome Evaluation: Oral pain medication given once. Oral abx. Tolerating regular diet. Pt to DC this evening.

## 2023-09-10 NOTE — PLAN OF CARE
Goal Outcome Evaluation:              Outcome Evaluation: Pt c/o headache scheduled meds given per orders, drsg to abd C/D/I with g/t, up with assist, booker diet

## 2023-09-10 NOTE — DISCHARGE PLACEMENT REQUEST
"To: Lorenza     From: Milvia JOHNSON [phone]        Jennifer Soliman (82 y.o. Female)       Date of Birth   1940    Social Security Number       Address   [address] [facility] [address]    Home Phone   [phone]    MRN   [id number]       Uatsdin   Tenriism    Marital Status                               Admission Date   9/4/23    Admission Type   Emergency    Admitting Provider   Dheeraj Kent MD    Attending Provider   Dheeraj Kent MD    Department, Room/Bed   [facility] 3C, 362/1       Discharge Date       Discharge Disposition   Home-Health Care [facility]    Discharge Destination                                 Attending Provider: Dheeraj Kent MD    Allergies: Ropinirole Hcl, Codeine, Definity [Perflutren Lipid Microsphere], Ambien [Zolpidem], Eszopiclone, Pregabalin, Tizanidine    Isolation: None   Infection: None   Code Status: CPR    Ht: 157.5 cm (62.01\")   Wt: 75.5 kg (166 lb 7.2 oz)    Admission Cmt: None   Principal Problem: E. coli UTI [N39.0,B96.20]                   Active Insurance as of 9/4/2023       Primary Coverage       Payor Plan Insurance Group Employer/Plan Group    [facility] MEDICARE REPLACEMENT [facility] MEDICARE REPLACEMENT [id number]       Payor Plan Address Payor Plan Phone Number Payor Plan Fax Number Effective Dates    [address] [address]   1/1/2017 - None Entered    [facility] [id number]         Subscriber Name Subscriber Birth Date Member ID       JENNIFER SOLIMAN 1940 [id number]                     Emergency Contacts        (Rel.) Home Phone Work Phone Mobile Phone    SolimanAdams harry (Spouse) [phone] -- [phone]    LiangIvonne ya (Daughter) [phone] -- --                 History & Physical        Claudio Kauffman MD at 09/04/23 2020              [facility]  HISTORY AND PHYSICAL    Date of Admission: 9/4/2023  Primary Care Physician: Olivia Mora, " "APRN    Subjective   Primary Historian: Patient    Chief Complaint: Back pain    History of Present Illness  82 year old female with PMH of lumbar stenosis, chronic pain, RLS, HTN, RAFAL that is brought to the ER after falling and remaining on the ground for \"a couple of days\". EMS was contacted for lift assist and they found her covered in stools, decided to not leave her at home and bring her to the ER instead. Patient appears groggy and her main concern is with pain. Blood work shows elevated creatinine, CPK, inflammatory changes in UA and elevated BNP. She appears to weak to stand and has failed the swallow evaluation.     Review of Systems   Otherwise complete ROS reviewed and negative except as mentioned in the HPI.    Past Medical History:   Past Medical History:   Diagnosis Date    Anxiety     Arthritis     Bronchitis     Cancer     uterine    Chronic pain     Depression     Disease of thyroid gland     Fibromyalgia     GERD (gastroesophageal reflux disease)     Headache     History of transfusion     AS     Hyperlipidemia     Hypertension     Incontinence     Insomnia     Leg pain     Lumbar stenosis     Migraines     Peptic ulcer     Restless legs     Sleep apnea     NO C-PAP    UTI (urinary tract infection)     Vaginal bleeding      Past Surgical History:  Past Surgical History:   Procedure Laterality Date    BLADDER REPAIR      MESH HAD TO BE REMOVED IN     BREAST BIOPSY Right 2017    benign    BREAST CYST EXCISION Left     CARDIAC CATHETERIZATION      CARPAL TUNNEL RELEASE      CATARACT EXTRACTION W/ INTRAOCULAR LENS  IMPLANT, BILATERAL      COLONOSCOPY      COLONOSCOPY N/A 10/01/2021    Procedure: COLONOSCOPY WITH ANESTHESIA;  Surgeon: Tom Velasco DO;  Location: [facility] ENDOSCOPY;  Service: Gastroenterology;  Laterality: N/A;  pre: change in bowel habits  post: diverticulosis. hemorrhoids.   Olivia Mora APRN        CYSTECTOMY      D & C HYSTEROSCOPY N/A 2017    " Procedure: DILATATION AND CURETTAGE HYSTEROSCOPY;  Surgeon: Shasta Madrigal MD;  Location: [facility] OR;  Service:     DILATION AND CURETTAGE, DIAGNOSTIC / THERAPEUTIC  2008    ENDOSCOPY  09/23/2010    Short segment of Arriola's,Moderate chroninc esophagogastritis and negative H.pylori    ENDOSCOPY N/A 09/25/2017    Procedure: ESOPHAGOGASTRODUODENOSCOPY WITH ANESTHESIA;  Surgeon: Tom Velasco DO;  Location:  [facility] ENDOSCOPY;  Service:     EYE SURGERY      RETINA    HEMORRHOIDECTOMY SIGMOIDOSCOPY N/A 3/21/2023    Procedure: HEMORRHOIDECTOMY WITH EXAM UNDER ANESTHESIA;  Surgeon: Holly Chavez MD;  Location:  [facility] OR;  Service: General;  Laterality: N/A;    HYSTERECTOMY  12/20/2017    ORIF TIBIA/FIBULA FRACTURES Left 2000    TRANSVAGINAL TAPING SUSPENSION N/A 11/06/2017    Procedure: VAGINAL MESH REVISION;  Surgeon: Shasta Madrigal MD;  Location:  [facility] OR;  Service:     VAGINAL MESH REVISION  2013     Social History:  reports that she has never smoked. She has never used smokeless tobacco. She reports that she does not currently use alcohol. She reports that she does not use drugs.    Family History: family history includes Cancer in her paternal grandmother; Diabetes in her mother and sister; Lung cancer in her paternal grandfather; Lymphoma in her brother; Multiple myeloma in her mother; Ovarian cancer in her paternal aunt; Prostate cancer in her brother; Stroke in her father.       Allergies:  Allergies   Allergen Reactions    Ropinirole Hcl Shortness Of Breath    Codeine Itching and Mental Status Change    Definity [Perflutren Lipid Microsphere] Other (See Comments)     Severe back pain    Ambien [Zolpidem] Other (See Comments)     HYPER     Eszopiclone Other (See Comments)     MAKES PT HYPER     Pregabalin Dizziness    Tizanidine Other (See Comments)     Terrible nightmares       Medications:  Prior to Admission medications    Medication Sig Start Date End Date Taking? Authorizing Provider   acetaminophen  (Tylenol) 325 MG tablet Take 3 tablets by mouth Every 8 (Eight) Hours. Take every 8 hours for 3 days then take prn as needed. 3/21/23 3/20/24  Holly Chavez MD   ALPRAZolam (XANAX) 1 MG tablet Take 1 tablet by mouth 3 (Three) Times a Day As Needed for Anxiety or Sleep.  Patient not taking: Reported on 8/24/2023 5/27/23   Shasta Madrigal MD   aspirin 325 MG tablet Take 1 tablet by mouth Daily.    Tamiko Gaviria MD   atorvastatin (LIPITOR) 40 MG tablet Take 1 tablet by mouth Daily. 1/16/23   Tamiko Gaviria MD   bumetanide (BUMEX) 1 MG tablet Take 1 tablet by mouth As Needed.    Tamiko Gaviria MD   butalbital-acetaminophen-caffeine (FIORICET, ESGIC) -40 MG per tablet Take 1 tablet by mouth 2 (Two) Times a Day As Needed for Headache.    Tamiko Gaviria MD   carvedilol (COREG) 12.5 MG tablet Take 1 tablet by mouth 2 (Two) Times a Day With Meals. 8/28/19   Tamiko Gaviria MD   cyclobenzaprine (FLEXERIL) 10 MG tablet 2 (Two) Times a Day As Needed. 11/17/22   Tamiko Gaviria MD   diclofenac (VOLTAREN) 75 MG EC tablet Take 1 tablet by mouth 2 (Two) Times a Day. 1/9/23   Tamiko Gaviria MD   diphenhydrAMINE (BENADRYL) 25 mg capsule Take 1 capsule by mouth Every 6 (Six) Hours As Needed for Itching.  Patient not taking: Reported on 8/24/2023    Tamiko Gaviria MD   donepezil (ARICEPT) 10 MG tablet Take 1 tablet by mouth Every Night. 10/18/22   Tamiko Gaviria MD   ergocalciferol (ERGOCALCIFEROL) 82859 units capsule Take 1 capsule by mouth 1 (One) Time Per Week. Saturday 4/17/19   Provider, Historical, MD   GNP Hydrocortisone Max St 1 % ointment APPLY TOPICALLY 3 TIMES DAILY. 2/2/23   Tamiko Gaviria MD   ibuprofen (Motrin IB) 200 MG tablet Take 3 tablets by mouth Every 8 (Eight) Hours. Take every 8 hours for three days then take as needed. 3/21/23 3/20/24  Holly Chavez MD   ipratropium (ATROVENT) 0.06 % nasal spray 2 sprays into the nostril(s)  as directed by provider 4 (Four) Times a Day As Needed for Rhinitis. For drainage  Patient not taking: Reported on 8/24/2023 1/19/22   Patricio Angeles Jr., MD   ipratropium (ATROVENT) 0.06 % nasal spray 2 sprays. 11/23/22   Tamiko Gaviria MD   LACTASE ENZYME PO Take 3 tablets by mouth As Needed (takes before dairy products).    Tamiko Gaviria MD   lansoprazole (PREVACID) 30 MG capsule Take 1 capsule by mouth Every Morning. 3/19/20   Tamiko Gaviria MD   levothyroxine (SYNTHROID, LEVOTHROID) 50 MCG tablet Take 1 tablet by mouth Every Morning.    Tamiko Gaviria MD   Lidocaine, Anorectal, (LMX 5) 5 % cream cream  5/18/23   Tamiko Gaviria MD   magnesium hydroxide (Milk of Magnesia) 400 MG/5ML suspension Take 30 mL by mouth Daily As Needed for Constipation.  Patient not taking: Reported on 8/24/2023 3/21/23 3/20/24  Holly Chavez MD   naloxone (Narcan) 4 MG/0.1ML nasal spray 1 spray into the nostril(s) as directed by provider As Needed (narcotic overdose).  Patient not taking: Reported on 8/24/2023 3/21/23   Holly Chavez MD   ondansetron (Zofran) 4 MG tablet Take 1 tablet by mouth Every 8 (Eight) Hours As Needed for Nausea or Vomiting. 3/21/23 3/20/24  Holly Chavez MD   oxyCODONE (ROXICODONE) 15 MG immediate release tablet Every 6 (Six) Hours As Needed. 11/21/22   Tamiko Gaviria MD   oxyCODONE (Roxicodone) 5 MG immediate release tablet Take 1 tablet by mouth Every 8 (Eight) Hours As Needed for Severe Pain. 3/21/23 3/20/24  Holly Chavez MD   polyethylene glycol (MiraLax) 17 g packet Take 17 g by mouth Daily. 1/16/23   Daphne Hussein APRN   sertraline (ZOLOFT) 25 MG tablet Take 1 tablet by mouth Daily. 2/28/23 2/28/24  Shasta Madrigal MD   solifenacin (VESICARE) 10 MG tablet Take 1 tablet by mouth Daily. 2/27/23 2/28/24  Provider, MD Tamiko   SUMAtriptan (IMITREX) 50 MG tablet Take 1 tablet by mouth 2 (Two) Times a Day As Needed for  "Migraine. 7/29/19   Shasta Madrigal MD   venlafaxine (EFFEXOR) 25 MG tablet Take 1 tablet by mouth Daily. 2/28/23   Shasta Madrigal MD     I have utilized all available immediate resources to obtain, update, or review the patient's current medications (including all prescriptions, over-the-counter products, herbals, cannabis/cannabidiol products, and vitamin/mineral/dietary (nutritional) supplements).    Objective     Vital Signs: /83 (BP Location: Right arm, Patient Position: Lying)   Pulse 97   Temp 97.5 °F (36.4 °C) (Oral)   Resp 17   Ht 157.5 cm (62\")   Wt 83.9 kg (185 lb)   LMP  (LMP Unknown)   SpO2 95%   BMI 33.84 kg/m²   Physical Exam  Vitals reviewed.   Constitutional:       General: She is not in acute distress.     Appearance: She is well-developed. She is not toxic-appearing.      Comments: Debilitated and disoriented elderly female. She appears groggy and unconcerned.    HENT:      Head: Normocephalic and atraumatic.      Right Ear: External ear normal.      Left Ear: External ear normal.      Mouth/Throat:      Mouth: Mucous membranes are dry.      Pharynx: Oropharynx is clear.   Eyes:      General:         Right eye: No discharge.         Left eye: No discharge.      Extraocular Movements: Extraocular movements intact.      Conjunctiva/sclera: Conjunctivae normal.      Pupils: Pupils are equal, round, and reactive to light.   Neck:      Vascular: No JVD.   Cardiovascular:      Rate and Rhythm: Normal rate and regular rhythm.      Pulses: Normal pulses.      Heart sounds: Normal heart sounds. No murmur heard.    No friction rub. No gallop.   Pulmonary:      Effort: Pulmonary effort is normal. No respiratory distress.      Breath sounds: No stridor. No wheezing, rhonchi or rales.   Chest:      Chest wall: No tenderness.   Abdominal:      General: Bowel sounds are normal. There is no distension.      Palpations: Abdomen is soft.      Tenderness: There is no abdominal tenderness. There is no " guarding or rebound.      Hernia: No hernia is present.   Musculoskeletal:         General: No swelling, tenderness or deformity. Normal range of motion.      Cervical back: Normal range of motion and neck supple. No rigidity or tenderness. No muscular tenderness.      Right lower leg: No edema.      Left lower leg: No edema.   Skin:     General: Skin is warm and dry.      Findings: No erythema or rash.   Neurological:      Mental Status: She is alert.      Cranial Nerves: No cranial nerve deficit.      Sensory: No sensory deficit.      Motor: No weakness or abnormal muscle tone.      Deep Tendon Reflexes: Reflexes normal.   Psychiatric:         Mood and Affect: Mood normal.         Behavior: Behavior normal.      Results Reviewed:  Lab Results (last 24 hours)       Procedure Component Value Units Date/Time    High Sensitivity Troponin T 2Hr [[id number]]  (Abnormal) Collected: 09/04/23 1857    Specimen: Blood from Arm, Left Updated: 09/04/23 1929     HS Troponin T 21 ng/L      Troponin T Delta -2 ng/L     Narrative:      High Sensitive Troponin T Reference Range:  <10.0 ng/L- Negative Female for AMI  <15.0 ng/L- Negative Male for AMI  >=10 - Abnormal Female indicating possible myocardial injury.  >=15 - Abnormal Male indicating possible myocardial injury.   Clinicians would have to utilize clinical acumen, EKG, Troponin, and serial changes to determine if it is an Acute Myocardial Infarction or myocardial injury due to an underlying chronic condition.         Urinalysis With Microscopic If Indicated (No Culture) - Straight Cath [[id number]]  (Abnormal) Collected: 09/04/23 1725    Specimen: Urine from Straight Cath Updated: 09/04/23 1810     Color, UA Dark Yellow     Appearance, UA Turbid     pH, UA 5.5     Specific Gravity, UA 1.019     Glucose, UA Negative     Ketones, UA Trace     Bilirubin, UA Small (1+)     Blood, UA Large (3+)     Protein,  mg/dL (2+)     Leuk Esterase, UA Large (3+)     Nitrite, UA  Positive     Urobilinogen, UA 1.0 E.U./dL    Urinalysis, Microscopic Only - Straight Cath [[id number]]  (Abnormal) Collected: 09/04/23 1725    Specimen: Urine from Straight Cath Updated: 09/04/23 1810     RBC, UA 13-20 /HPF      WBC, UA Too Numerous to Count /HPF      Bacteria, UA 4+ /HPF      Squamous Epithelial Cells, UA 3-6 /HPF      Hyaline Casts, UA None Seen /LPF      Methodology Manual Light Microscopy    Manual Differential [[id number]]  (Abnormal) Collected: 09/04/23 1657    Specimen: Blood from Arm, Left Updated: 09/04/23 1808     Neutrophil % 52.0 %      Lymphocyte % 7.0 %      Monocyte % 4.0 %      Bands %  34.0 %      Metamyelocyte % 3.0 %      Neutrophils Absolute 17.73 10*3/mm3      Lymphocytes Absolute 1.44 10*3/mm3      Monocytes Absolute 0.82 10*3/mm3      Anisocytosis Slight/1+     Crenated RBC's Mod/2+     Poikilocytes Mod/2+     Polychromasia Slight/1+     WBC Morphology Normal     Platelet Morphology Normal    CBC & Differential [[id number]]  (Abnormal) Collected: 09/04/23 1657    Specimen: Blood from Arm, Left Updated: 09/04/23 1808    Narrative:      The following orders were created for panel order CBC & Differential.  Procedure                               Abnormality         Status                     ---------                               -----------         ------                     CBC Auto Differential[[id number]]        Abnormal            Final result                 Please view results for these tests on the individual orders.    CBC Auto Differential [[id number]]  (Abnormal) Collected: 09/04/23 1657    Specimen: Blood from Arm, Left Updated: 09/04/23 1808     WBC 20.62 10*3/mm3      RBC 3.11 10*6/mm3      Hemoglobin 9.6 g/dL      Hematocrit 29.5 %      MCV 94.9 fL      MCH 30.9 pg      MCHC 32.5 g/dL      RDW 15.0 %      RDW-SD 51.4 fl      MPV 12.9 fL      Platelets 182 10*3/mm3     Narrative:      The previously reported component NRBC is no longer being reported. Previous result  was 0.0 /100 WBC (Reference Range: 0.0-0.2 /100 WBC) on 9/4/2023 at 1749 CDT.    Comprehensive Metabolic Panel [[id number]]  (Abnormal) Collected: 09/04/23 1657    Specimen: Blood from Arm, Left Updated: 09/04/23 1803     Glucose 114 mg/dL      BUN 51 mg/dL      Creatinine 1.66 mg/dL      Sodium 138 mmol/L      Potassium 3.1 mmol/L      Comment: Slight hemolysis detected by analyzer. Results may be affected.        Chloride 105 mmol/L      CO2 17.0 mmol/L      Calcium 9.7 mg/dL      Total Protein 6.1 g/dL      Albumin 3.1 g/dL      ALT (SGPT) 28 U/L      AST (SGOT) 43 U/L      Comment: Slight hemolysis detected by analyzer. Results may be affected.        Alkaline Phosphatase 91 U/L      Total Bilirubin 0.4 mg/dL      Globulin 3.0 gm/dL      A/G Ratio 1.0 g/dL      BUN/Creatinine Ratio 30.7     Anion Gap 16.0 mmol/L      eGFR 31.1 mL/min/1.73     Narrative:      GFR Normal >60  Chronic Kidney Disease <60  Kidney Failure <15    The GFR formula is only valid for adults with stable renal function between ages 18 and 70.    Magnesium [[id number]]  (Normal) Collected: 09/04/23 1657    Specimen: Blood from Arm, Left Updated: 09/04/23 1803     Magnesium 2.4 mg/dL     Lactic Acid, Plasma [[id number]]  (Abnormal) Collected: 09/04/23 1657    Specimen: Blood from Arm, Left Updated: 09/04/23 1801     Lactate 2.1 mmol/L     CK [[id number]]  (Abnormal) Collected: 09/04/23 1657    Specimen: Blood from Arm, Left Updated: 09/04/23 1800     Creatine Kinase 676 U/L     BNP [[id number]]  (Abnormal) Collected: 09/04/23 1657    Specimen: Blood from Arm, Left Updated: 09/04/23 1758     proBNP 2,431.0 pg/mL     Narrative:      Among patients with dyspnea, NT-proBNP is highly sensitive for the detection of acute congestive heart failure. In addition NT-proBNP of <300 pg/ml effectively rules out acute congestive heart failure with 99% negative predictive value.      High Sensitivity Troponin T [[id number]]  (Abnormal) Collected: 09/04/23 1657     Specimen: Blood from Arm, Left Updated: 09/04/23 1757     HS Troponin T 23 ng/L     Narrative:      High Sensitive Troponin T Reference Range:  <10.0 ng/L- Negative Female for AMI  <15.0 ng/L- Negative Male for AMI  >=10 - Abnormal Female indicating possible myocardial injury.  >=15 - Abnormal Male indicating possible myocardial injury.   Clinicians would have to utilize clinical acumen, EKG, Troponin, and serial changes to determine if it is an Acute Myocardial Infarction or myocardial injury due to an underlying chronic condition.         Blood Culture - Blood, Hand, Left [[id number]] Collected: 09/04/23 1743    Specimen: Blood from Hand, Left Updated: 09/04/23 1757    Lipase [[id number]]  (Abnormal) Collected: 09/04/23 1657    Specimen: Blood from Arm, Left Updated: 09/04/23 1755     Lipase 7 U/L     Blood Culture - Blood, Arm, Left [[id number]] Collected: 09/04/23 1704    Specimen: Blood from Arm, Left Updated: 09/04/23 1744          Imaging Results (Last 24 Hours)       Procedure Component Value Units Date/Time    XR Shoulder 2+ View Left [[id number]] Collected: 09/04/23 1952     Updated: 09/04/23 1957    Narrative:      EXAM/TECHNIQUE: XR SHOULDER 2+ VW LEFT-     INDICATION: left shoulder pain     COMPARISON: None     FINDINGS:     Glenohumeral and acromioclavicular joints are maintained. No acute  fracture or dislocation. Mild AC joint osteophyte development. No  suspicious bone lesion. Included portion of the LEFT chest appears  unremarkable. No acute soft tissue finding.       Impression:         No acute osseous findings.  This report was finalized on 09/04/2023 19:54 by Dr. Bakari Ramos MD.    CT Thoracic Spine Without Contrast [[id number]] Collected: 09/04/23 1859     Updated: 09/04/23 1905    Narrative:      EXAM/TECHNIQUE: CT thoracic spine without contrast     INDICATION: Spine fracture, thoracic, traumatic     COMPARISON: None     DLP: 963 mGy cm. Automated exposure control was also utilized  to  decrease patient radiation dose.     FINDINGS:     Mild thoracic spine levocurvature, potentially related to patient  positioning. Thoracic kyphosis is maintained. No subluxations. Vertebral  body heights are maintained. No acute fracture. Mild multilevel thoracic  spine degenerative change. Included portion of the posterior lungs are  clear. No pneumothorax. Paravertebral soft tissues are unremarkable.  Esophagus is mildly patulous.       Impression:         1.  No acute fracture or subluxation.  2.  Mild multilevel cervical spine degenerative change.  This report was finalized on 09/04/2023 19:01 by Dr. Bakari Ramos MD.    CT Lumbar Spine Without Contrast [[id number]] Collected: 09/04/23 1852     Updated: 09/04/23 1901    Narrative:      EXAM/TECHNIQUE:  1. CT abdomen pelvis without contrast  2. CT lumbar spine without contrast     INDICATION: Fall, abdominal pain, back pain     COMPARISON: 02/08/2022     DLP: 252 mGy cm. Automated exposure control was also utilized to  decrease patient radiation dose.     FINDINGS:     CT abdomen pelvis without contrast:     Mild atelectasis in the included lung bases. The unenhanced liver,  gallbladder, adrenal glands, and pancreas are unremarkable. Gallbladder  is distended without evidence of wall thickening or gallstones. No large  renal lesion. No urolithiasis or hydronephrosis. No focal urinary  bladder abnormality.     Large volume stool in the rectum and distal colon. Long segment wall  thickening of the sigmoid colon and distal descending colon with  surrounding fat stranding. A few scattered colonic diverticula are also  present within this region. The ascending and transverse colon appear  unremarkable. No secondary signs of appendicitis. No small bowel  distention or evidence of active small bowel inflammation.     No ascites or free pelvic fluid. No pelvic mass or pelvic collection.  Prior hysterectomy.     Normal caliber abdominal aorta with atherosclerotic  calcification. No  enlarged abdominal or pelvic lymph nodes. No acute pelvic or hip  fracture. Sacrum appears intact. SI joints and pubic symphysis are  intact.     CT lumbar spine without contrast:     5 lumbar type vertebral bodies. Grade 1 anterolisthesis of L4 on L5,  likely degenerative in nature. Lumbar vertebral body heights are  maintained. No acute fracture. Moderate multilevel lumbar spine  degenerative change with multilevel central canal or neural foraminal  stenosis.       Impression:         1.  No acute traumatic finding in the abdomen or pelvis.     2.  No acute lumbar spine fracture.     3.  Large volume stool in the rectum and distal colon. Wall thickening  and inflammation of the sigmoid colon and distal descending colon,  compatible with acute colitis. This may be related to stercoral colitis  although differential also includes infectious colitis and acute  diverticulitis given the presence of small diverticula.     4.  Multilevel lumbar spine degenerative change and grade 1  anterolisthesis of L4 on L5 which is likely degenerative.     5.  Distended gallbladder without evidence of acute cholecystitis.  This report was finalized on 09/04/2023 18:58 by Dr. Bakari Ramos MD.    CT Abdomen Pelvis Without Contrast [[id number]] Collected: 09/04/23 1852     Updated: 09/04/23 1901    Narrative:      EXAM/TECHNIQUE:  1. CT abdomen pelvis without contrast  2. CT lumbar spine without contrast     INDICATION: Fall, abdominal pain, back pain     COMPARISON: 02/08/2022     DLP: 252 mGy cm. Automated exposure control was also utilized to  decrease patient radiation dose.     FINDINGS:     CT abdomen pelvis without contrast:     Mild atelectasis in the included lung bases. The unenhanced liver,  gallbladder, adrenal glands, and pancreas are unremarkable. Gallbladder  is distended without evidence of wall thickening or gallstones. No large  renal lesion. No urolithiasis or hydronephrosis. No focal  urinary  bladder abnormality.     Large volume stool in the rectum and distal colon. Long segment wall  thickening of the sigmoid colon and distal descending colon with  surrounding fat stranding. A few scattered colonic diverticula are also  present within this region. The ascending and transverse colon appear  unremarkable. No secondary signs of appendicitis. No small bowel  distention or evidence of active small bowel inflammation.     No ascites or free pelvic fluid. No pelvic mass or pelvic collection.  Prior hysterectomy.     Normal caliber abdominal aorta with atherosclerotic calcification. No  enlarged abdominal or pelvic lymph nodes. No acute pelvic or hip  fracture. Sacrum appears intact. SI joints and pubic symphysis are  intact.     CT lumbar spine without contrast:     5 lumbar type vertebral bodies. Grade 1 anterolisthesis of L4 on L5,  likely degenerative in nature. Lumbar vertebral body heights are  maintained. No acute fracture. Moderate multilevel lumbar spine  degenerative change with multilevel central canal or neural foraminal  stenosis.       Impression:         1.  No acute traumatic finding in the abdomen or pelvis.     2.  No acute lumbar spine fracture.     3.  Large volume stool in the rectum and distal colon. Wall thickening  and inflammation of the sigmoid colon and distal descending colon,  compatible with acute colitis. This may be related to stercoral colitis  although differential also includes infectious colitis and acute  diverticulitis given the presence of small diverticula.     4.  Multilevel lumbar spine degenerative change and grade 1  anterolisthesis of L4 on L5 which is likely degenerative.     5.  Distended gallbladder without evidence of acute cholecystitis.  This report was finalized on 09/04/2023 18:58 by Dr. Bakari Ramos MD.    CT Cervical Spine Without Contrast [[id number]] Collected: 09/04/23 1844     Updated: 09/04/23 1852    Narrative:      EXAM/TECHNIQUE: CT  cervical spine without contrast     INDICATION: Neck trauma (Age >= 65y)     COMPARISON: None     DLP: 367 mGy cm. Automated exposure control was also utilized to  decrease patient radiation dose.     FINDINGS:     Craniocervical relationships are maintained. No evidence of acute  odontoid process fracture. Corticated ossific fragment along the tip of  the odontoid may be related to old injury. Trace anterolisthesis of C4  on C5. Cervical spine alignment is otherwise maintained. Vertebral body  heights are maintained. No acute fracture. Facet joints are anatomically  aligned. Moderate multilevel cervical spine degenerative change with  multilevel neural foraminal stenosis, most pronounced at C5-C6.  Paravertebral soft tissues are unremarkable. Carotid artery  atherosclerotic calcifications are noted.       Impression:         1.  No acute osseous findings.  2.  Moderate multilevel cervical spine degenerative change, greatest at  C5-C6.  3.  Trace anterolisthesis of C4 on C5, likely degenerative in nature.  This report was finalized on 09/04/2023 18:49 by Dr. Bakari Ramos MD.    CT Head Without Contrast [[id number]] Collected: 09/04/23 1844     Updated: 09/04/23 1847    Narrative:      EXAM/TECHNIQUE: CT head without contrast     INDICATION: Head trauma, moderate-severe     COMPARISON: 02/08/2022     DLP: 697 mGy cm. Automated exposure control was also utilized to  decrease patient radiation dose.     FINDINGS:     No evidence of intracranial hemorrhage. Gray-white differentiation is  maintained. Chronic microvascular ischemic white matter change and  global cerebral volume loss. Mild ex vacuo ventricular dilatation. No  midline shift or mass effect. Basilar cisterns are patent. No acute  orbital finding. Mastoid air cells are clear. Chronic RIGHT maxillary  sinus mucosal thickening and mucosal retention cyst. Motion artifact  limits evaluation of the skull base. No acute osseous finding.       Impression:          1.  No acute intracranial findings.  2.  Global cerebral volume loss and presumed chronic microvascular  ischemic white matter change.  This report was finalized on 09/04/2023 18:44 by Dr. Bakari Ramos MD.    XR Chest 1 View [[id number]] Collected: 09/04/23 1705     Updated: 09/04/23 1710    Narrative:      EXAM/TECHNIQUE: XR CHEST 1 VW-     INDICATION: Altered mental status     COMPARISON: 03/14/2023     FINDINGS:     Cardiac silhouette is normal in size. No pleural effusion, pneumothorax,  or focal consolidation. No acute osseous finding.       Impression:         No acute findings.  This report was finalized on 09/04/2023 17:07 by Dr. Bakari Ramos MD.          I have personally reviewed and interpreted the radiology studies and ECG obtained at time of admission.     Assessment / Plan   Assessment:   Active Hospital Problems    Diagnosis     **UTI (urinary tract infection)     OTMÁS (acute kidney injury)     Chronic pain syndrome     Chronic prescription opiate use     Hypothyroidism (acquired)     Essential hypertension     Non-traumatic rhabdomyolysis     Spinal stenosis, lumbar region, without neurogenic claudication      Treatment Plan  The patient will be admitted to my service here at [facility].   Admit to medical floor  Vitals every 4 hours  NPO due to failed swallow evaluation  IVF NS 75 cc/hour  Pain control > ?opiate overuse    Rocephin for UTI  I&O, daily BMP    Echocardiogram evaluate LVEF    Social work consult for APS    DVT prophylaxis > Lovenox 30 mg SQ daily      Medical Decision Making  Number and Complexity of problems: 4 complex medical problems  Differential Diagnosis: none    Conditions and Status        Condition is unchanged.     [facility] Data  External documents reviewed: [employer] EHR  Cardiac tracing (EKG, telemetry) interpretation: NSR  Radiology interpretation: See above  Labs reviewed: See above  Any tests that were considered but not ordered: none     Decision rules/scores  evaluated (example [id number], Wells, etc): N/A     Discussed with: Patient     Care Planning  Shared decision making: Patient  Code status and discussions: Full code    Disposition  Social Determinants of Health that impact treatment or disposition: none  Estimated length of stay is to be determined.     I confirmed that the patient's advanced care plan is present, code status is documented, and a surrogate decision maker is listed in the patient's medical record.     The patient's surrogate decision maker is Adams Gomes, spouse.     The patient was seen and examined by me on 9/04/2023 at 2020.    Electronically signed by Claudio Kauffman MD, 09/04/23, 20:20 CDT.              Electronically signed by Claudio Kauffman MD at 09/05/23 0648       Discharge Summary    No notes of this type exist for this encounter.       Ambulatory Referral to Home Health [[id number]] (Order [id number])  Order  Date: 9/10/2023 Department: [address] Ordering/Authorizing: Steff Bowen APRN     Order History  Outpatient  Date/Time Action Taken User Additional Information   09/10/23 0755 Sign Steff Bowen APRN      Order Details    Frequency Duration Priority Order Class   None None Routine Internal Referral     Start Date/Time    Start Date   09/10/23     Order Information    Order Date Service Start Date Start Time   09/10/23 Medicine 09/10/23      Reference Links    Associated Reports External References   View Encounter Current Health Referral Information     Order Questions    Question Answer   Face to Face Visit Date: 9/10/2023   Follow-up provider for Plan of Care? I treated the patient in an acute care facility and will not continue treatment after discharge.   Follow-up provider: TAWANNA CAROLINA   Reason/Clinical Findings impaired mobility, walker   Describe mobility limitations that make leaving home difficult: impaired mobility, walker   Nursing/Therapeutic Services Requested Skilled  Nursing    Physical Therapy    Occupational Therapy   Skilled nursing orders: Medication education    Wound care dressing/changes   PT orders: Therapeutic exercise    Gait Training    Home safety assessment    Strengthening   Weight Bearing Status As Tolerated   Occupational orders: Activities of daily living    Strengthening    Home safety assessment   Frequency: 1 Week 1            Source Order Set / Preference List    Order Set   [facility] GEN EXPRESS DISCHARGE [[id number]]           Clinical Indications     ICD-10-CM ICD-9-CM   Frequent falls    R29.6 V15.88   Chronic pain syndrome    G89.4 338.4   Spinal stenosis, lumbar region, without neurogenic claudication    M48.061 724.02                             Reprint Order Requisition    Ambulatory Referral to Home Health (Order #[id number]) on 9/10/23         Encounter    View Encounter                Order Provider Info        Office phone Pager E-mail   Ordering User Steff Bowen APRN [phone] -- --   Authorizing Provider Steff Bowen APRN [phone] -- --   Attending Provider Dheeraj Kent [phone] -- --     Tracking Reports    Cosign Tracking Order Transmittal Tracking     Authorized by:  MORGAN Ramirez  (NPI: [id number])                Lab Component SmartPhrase Guide    Ambulatory Referral to Home Health (Order #[id number]) on 9/10/23

## 2023-09-10 NOTE — DISCHARGE SUMMARY
[facility] Medicine Services  DISCHARGE SUMMARY     Date of Admission: 9/4/2023  Date of Discharge:  9/10/2023  Primary Care Physician: Olivia Mora APRN    Presenting Problem/History of Present Illness:  Back pain    Final Discharge Diagnoses:  Active Hospital Problems    Diagnosis     **E. coli UTI     Acute cholecystitis     TOMÁS (acute kidney injury)     Moderate malnutrition     Transaminitis     Colitis     Chronic pain syndrome     Chronic prescription opiate use     Hypothyroidism (acquired)     Essential hypertension     Non-traumatic rhabdomyolysis     Spinal stenosis, lumbar region, without neurogenic claudication     Gastroesophageal reflux disease      Consults:   Dr. Arciniega, general surgery    Procedures Performed: Laparoscopic cholecystectomy and intraoperative cholangiogram 9/7/2023 Dr. Arciniega    Pertinent Test Results:   Results for orders placed during the hospital encounter of 09/04/23    Adult Transthoracic Echo Complete W/ Cont if Necessary Per Protocol    Interpretation Summary    Left ventricular systolic function is normal. Left ventricular ejection fraction appears to be 56 - 60%.    Left ventricular wall thickness is consistent with moderate concentric hypertrophy.    Left ventricular diastolic function is consistent with (grade Ia w/high LAP) impaired relaxation.    The left atrial cavity is moderate to severely dilated.    Left atrial volume is moderately increased.    The right atrial cavity is borderline dilated.    Estimated right ventricular systolic pressure from tricuspid regurgitation is mildly elevated (35-45 mmHg).    Imaging Results (All)       Procedure Component Value Units Date/Time    FL Cholangiogram Operative [[id number]] Collected: 09/07/23 1507     Updated: 09/07/23 1511    Narrative:      FL CHOLANGIOGRAM OPERATIVE- 9/7/2023 1:50 PM CDT     HISTORY: stones; N30.00-Acute cystitis without hematuria;  M62.82-Rhabdomyolysis;  N17.9-Acute kidney failure, unspecified;  I50.9-Heart failure, unspecified; R62.7-Adult failure to thrive;  W19.XXXA-Unspecified fall, initial encounter; R13.10-Dysphagia,  unspecified; K81.0-Acute cholecystitis; K81.9-Cholecystitis, unspecified     COMPARISON: None     FLUOROSCOPY TIME: 6 seconds     FLUOROSCOPY DOSE/ CUMULATIVE AIR KERMA: 1.1003 mGy     NUMBER OF IMAGES: 9       Impression:         Intraoperative fluoroscopic images during intraoperative cholangiogram.  Mild prominence of the common bile duct with no convincing fixed  intraluminal filling defects. Antegrade flow into the duodenum. No  contrast extravasation..     Please refer to the operative note for more details.  This report was finalized on 09/07/2023 15:08 by Dr. Blanka Pak MD.    FL C Arm During Surgery [[id number]] Resulted: 09/07/23 1408     Updated: 09/07/23 1408    Narrative:      This procedure was auto-finalized with no dictation required.    US Abdomen Limited [[id number]] Collected: 09/06/23 1113     Updated: 09/06/23 1120    Narrative:      US ABDOMEN LIMITED- 9/6/2023 9:24 AM CDT     REASON FOR EXAM: transaminitis; N30.00-Acute cystitis without hematuria;  M62.82-Rhabdomyolysis; N17.9-Acute kidney failure, unspecified;  I50.9-Heart failure, unspecified; R62.7-Adult failure to thrive;  W19.XXXA-Unspecified fall, initial encounter; R13.10-Dysphagia,  unspecified       COMPARISON: CT abdomen pelvis 9/4/2023.      TECHNIQUE: Multiple longitudinal and transverse realtime sonographic  images of the right upper quadrant of the abdomen are obtained.      FINDINGS:    Pancreas: Normal in size and echogenicity.      Liver: Normal in size, echogenicity and echotexture. No focal lesion.      Gallbladder: The gallbladder is mildly distended. No visualized stones.  Borderline gallbladder wall thickening measuring up to 3 mm. No  pericholecystic fluid..      Bile ducts: The CBD measures 0.7 cm in diameter. There is no  intrahepatic or  extrahepatic ductal dilation.     Right kidney: Measures 9.2 cm in length. Normal in sonographic  appearance.     Other: No ascites.        Impression:         Mildly distended gallbladder with borderline gallbladder wall  thickening. Recommend correlation for early acute or chronic  cholecystitis. No visualized gallstones. No pericholecystic fluid. This  could be further evaluated with HIDA scan if clinically indicated.  This report was finalized on 09/06/2023 11:17 by  Sunny Degroot DO.    XR Shoulder 2+ View Left [[id number]] Collected: 09/04/23 1952     Updated: 09/04/23 1957    Narrative:      EXAM/TECHNIQUE: XR SHOULDER 2+ VW LEFT-     INDICATION: left shoulder pain     COMPARISON: None     FINDINGS:     Glenohumeral and acromioclavicular joints are maintained. No acute  fracture or dislocation. Mild AC joint osteophyte development. No  suspicious bone lesion. Included portion of the LEFT chest appears  unremarkable. No acute soft tissue finding.       Impression:         No acute osseous findings.  This report was finalized on 09/04/2023 19:54 by Dr. Bakari Ramos MD.    CT Thoracic Spine Without Contrast [[id number]] Collected: 09/04/23 1859     Updated: 09/04/23 1905    Narrative:      EXAM/TECHNIQUE: CT thoracic spine without contrast     INDICATION: Spine fracture, thoracic, traumatic     COMPARISON: None     DLP: 963 mGy cm. Automated exposure control was also utilized to  decrease patient radiation dose.     FINDINGS:     Mild thoracic spine levocurvature, potentially related to patient  positioning. Thoracic kyphosis is maintained. No subluxations. Vertebral  body heights are maintained. No acute fracture. Mild multilevel thoracic  spine degenerative change. Included portion of the posterior lungs are  clear. No pneumothorax. Paravertebral soft tissues are unremarkable.  Esophagus is mildly patulous.       Impression:         1.  No acute fracture or subluxation.  2.  Mild multilevel cervical spine  degenerative change.  This report was finalized on 09/04/2023 19:01 by Dr. Bakari Ramos MD.    CT Lumbar Spine Without Contrast [[id number]] Collected: 09/04/23 1852     Updated: 09/04/23 1901    Narrative:      EXAM/TECHNIQUE:  1. CT abdomen pelvis without contrast  2. CT lumbar spine without contrast     INDICATION: Fall, abdominal pain, back pain     COMPARISON: 02/08/2022     DLP: 252 mGy cm. Automated exposure control was also utilized to  decrease patient radiation dose.     FINDINGS:     CT abdomen pelvis without contrast:     Mild atelectasis in the included lung bases. The unenhanced liver,  gallbladder, adrenal glands, and pancreas are unremarkable. Gallbladder  is distended without evidence of wall thickening or gallstones. No large  renal lesion. No urolithiasis or hydronephrosis. No focal urinary  bladder abnormality.     Large volume stool in the rectum and distal colon. Long segment wall  thickening of the sigmoid colon and distal descending colon with  surrounding fat stranding. A few scattered colonic diverticula are also  present within this region. The ascending and transverse colon appear  unremarkable. No secondary signs of appendicitis. No small bowel  distention or evidence of active small bowel inflammation.     No ascites or free pelvic fluid. No pelvic mass or pelvic collection.  Prior hysterectomy.     Normal caliber abdominal aorta with atherosclerotic calcification. No  enlarged abdominal or pelvic lymph nodes. No acute pelvic or hip  fracture. Sacrum appears intact. SI joints and pubic symphysis are  intact.     CT lumbar spine without contrast:     5 lumbar type vertebral bodies. Grade 1 anterolisthesis of L4 on L5,  likely degenerative in nature. Lumbar vertebral body heights are  maintained. No acute fracture. Moderate multilevel lumbar spine  degenerative change with multilevel central canal or neural foraminal  stenosis.       Impression:         1.  No acute traumatic finding in  the abdomen or pelvis.     2.  No acute lumbar spine fracture.     3.  Large volume stool in the rectum and distal colon. Wall thickening  and inflammation of the sigmoid colon and distal descending colon,  compatible with acute colitis. This may be related to stercoral colitis  although differential also includes infectious colitis and acute  diverticulitis given the presence of small diverticula.     4.  Multilevel lumbar spine degenerative change and grade 1  anterolisthesis of L4 on L5 which is likely degenerative.     5.  Distended gallbladder without evidence of acute cholecystitis.  This report was finalized on 09/04/2023 18:58 by Dr. Bakari Ramos MD.    CT Abdomen Pelvis Without Contrast [[id number]] Collected: 09/04/23 1852     Updated: 09/04/23 1901    Narrative:      EXAM/TECHNIQUE:  1. CT abdomen pelvis without contrast  2. CT lumbar spine without contrast     INDICATION: Fall, abdominal pain, back pain     COMPARISON: 02/08/2022     DLP: 252 mGy cm. Automated exposure control was also utilized to  decrease patient radiation dose.     FINDINGS:     CT abdomen pelvis without contrast:     Mild atelectasis in the included lung bases. The unenhanced liver,  gallbladder, adrenal glands, and pancreas are unremarkable. Gallbladder  is distended without evidence of wall thickening or gallstones. No large  renal lesion. No urolithiasis or hydronephrosis. No focal urinary  bladder abnormality.     Large volume stool in the rectum and distal colon. Long segment wall  thickening of the sigmoid colon and distal descending colon with  surrounding fat stranding. A few scattered colonic diverticula are also  present within this region. The ascending and transverse colon appear  unremarkable. No secondary signs of appendicitis. No small bowel  distention or evidence of active small bowel inflammation.     No ascites or free pelvic fluid. No pelvic mass or pelvic collection.  Prior hysterectomy.     Normal caliber  abdominal aorta with atherosclerotic calcification. No  enlarged abdominal or pelvic lymph nodes. No acute pelvic or hip  fracture. Sacrum appears intact. SI joints and pubic symphysis are  intact.     CT lumbar spine without contrast:     5 lumbar type vertebral bodies. Grade 1 anterolisthesis of L4 on L5,  likely degenerative in nature. Lumbar vertebral body heights are  maintained. No acute fracture. Moderate multilevel lumbar spine  degenerative change with multilevel central canal or neural foraminal  stenosis.       Impression:         1.  No acute traumatic finding in the abdomen or pelvis.     2.  No acute lumbar spine fracture.     3.  Large volume stool in the rectum and distal colon. Wall thickening  and inflammation of the sigmoid colon and distal descending colon,  compatible with acute colitis. This may be related to stercoral colitis  although differential also includes infectious colitis and acute  diverticulitis given the presence of small diverticula.     4.  Multilevel lumbar spine degenerative change and grade 1  anterolisthesis of L4 on L5 which is likely degenerative.     5.  Distended gallbladder without evidence of acute cholecystitis.  This report was finalized on 09/04/2023 18:58 by Dr. Bakari Ramos MD.    CT Cervical Spine Without Contrast [[id number]] Collected: 09/04/23 1844     Updated: 09/04/23 1852    Narrative:      EXAM/TECHNIQUE: CT cervical spine without contrast     INDICATION: Neck trauma (Age >= 65y)     COMPARISON: None     DLP: 367 mGy cm. Automated exposure control was also utilized to  decrease patient radiation dose.     FINDINGS:     Craniocervical relationships are maintained. No evidence of acute  odontoid process fracture. Corticated ossific fragment along the tip of  the odontoid may be related to old injury. Trace anterolisthesis of C4  on C5. Cervical spine alignment is otherwise maintained. Vertebral body  heights are maintained. No acute fracture. Facet joints  are anatomically  aligned. Moderate multilevel cervical spine degenerative change with  multilevel neural foraminal stenosis, most pronounced at C5-C6.  Paravertebral soft tissues are unremarkable. Carotid artery  atherosclerotic calcifications are noted.       Impression:         1.  No acute osseous findings.  2.  Moderate multilevel cervical spine degenerative change, greatest at  C5-C6.  3.  Trace anterolisthesis of C4 on C5, likely degenerative in nature.  This report was finalized on 09/04/2023 18:49 by Dr. Bakari Ramos MD.    CT Head Without Contrast [[id number]] Collected: 09/04/23 1844     Updated: 09/04/23 1847    Narrative:      EXAM/TECHNIQUE: CT head without contrast     INDICATION: Head trauma, moderate-severe     COMPARISON: 02/08/2022     DLP: 697 mGy cm. Automated exposure control was also utilized to  decrease patient radiation dose.     FINDINGS:     No evidence of intracranial hemorrhage. Gray-white differentiation is  maintained. Chronic microvascular ischemic white matter change and  global cerebral volume loss. Mild ex vacuo ventricular dilatation. No  midline shift or mass effect. Basilar cisterns are patent. No acute  orbital finding. Mastoid air cells are clear. Chronic RIGHT maxillary  sinus mucosal thickening and mucosal retention cyst. Motion artifact  limits evaluation of the skull base. No acute osseous finding.       Impression:         1.  No acute intracranial findings.  2.  Global cerebral volume loss and presumed chronic microvascular  ischemic white matter change.  This report was finalized on 09/04/2023 18:44 by Dr. Bakari Ramos MD.    XR Chest 1 View [[id number]] Collected: 09/04/23 1705     Updated: 09/04/23 1710    Narrative:      EXAM/TECHNIQUE: XR CHEST 1 VW-     INDICATION: Altered mental status     COMPARISON: 03/14/2023     FINDINGS:     Cardiac silhouette is normal in size. No pleural effusion, pneumothorax,  or focal consolidation. No acute osseous finding.        Impression:         No acute findings.  This report was finalized on 09/04/2023 17:07 by Dr. Bakari Ramos MD.          LAB RESULTS:      Lab 09/09/23  0420 09/08/23  0429 09/07/23  0506 09/06/23  1307 09/06/23  0353 09/05/23  0539 09/04/23  2024 09/04/23  1657   WBC 10.65 8.24 8.39  --  10.40 13.50*  --  20.62*   HEMOGLOBIN 9.2* 7.7* 8.7*  --  7.4* 8.8*  --  9.6*   HEMATOCRIT 30.2* 24.9* 30.5*  --  24.2* 28.0*  --  29.5*   PLATELETS 197 154 148  --  151 150  --  182   NEUTROS ABS  --   --  7.60*  --  9.54* 12.42*  --  17.73*   MCV 97.1* 96.1 105.9*  --  98.8* 96.6  --  94.9   LACTATE  --   --  1.3  --   --   --  1.5 2.1*   PROTIME  --   --  16.1* 16.9*  --   --   --   --          Lab 09/10/23  0628 09/09/23  0420 09/08/23  0429 09/07/23  0506 09/06/23  1307 09/06/23  0353 09/05/23  0539 09/04/23  1857 09/04/23  1657   SODIUM 142 142 140 140 142   < > 140  --  138   POTASSIUM 3.8 3.1* 3.5 3.4* 3.4*   < > 2.9*  --  3.1*   CHLORIDE 111* 113* 113* 112* 113*   < > 110*  --  105   CO2 21.0* 19.0* 19.0* 16.0* 19.0*   < > 17.0*  --  17.0*   ANION GAP 10.0 10.0 8.0 12.0 10.0   < > 13.0  --  16.0*   BUN 11 14 18 25* 31*   < > 42*  --  51*   CREATININE 0.67 0.60 0.72 0.73 0.82   < > 1.10*  --  1.66*   EGFR 88.5 90.9 84.6 83.3 72.4   < > 50.9*  --  31.1*   GLUCOSE 118* 107* 154* 120* 143*   < > 116*  --  114*   CALCIUM 8.9 8.9 8.5* 9.1 8.7   < > 8.9  --  9.7   MAGNESIUM 2.4 1.6  --   --   --   --   --  2.2 2.4   PHOSPHORUS  --   --   --   --   --   --  3.1  --   --     < > = values in this interval not displayed.         Lab 09/10/23  0628 09/09/23  0420 09/08/23  0429 09/07/23  0506 09/06/23  1307 09/06/23  0353 09/04/23  1657   TOTAL PROTEIN 5.8* 5.4* 5.4* 5.6* 5.8*   < > 6.1   ALBUMIN 2.6* 2.4* 2.3* 2.4* 2.5*   < > 3.1*   GLOBULIN 3.2 3.0 3.1 3.2 3.3   < > 3.0   ALT (SGPT) 120* 180* 267* 434* 501*   < > 28   AST (SGOT) 41* 82* 213* 531* 833*   < > 43*   BILIRUBIN 0.4 0.2 0.3 0.6 1.3*   < > 0.4   ALK PHOS 231* 240* 290*  398* 413*   < > 91   LIPASE  --   --   --   --   --   --  7*    < > = values in this interval not displayed.         Lab 09/07/23  0506 09/06/23  1307 09/04/23  1857 09/04/23  1657   PROBNP  --   --   --  2,431.0*   HSTROP T  --   --  21* 23*   PROTIME 16.1* 16.9*  --   --    INR 1.27* 1.36*  --   --              Lab 09/06/23  0353   VITAMIN B 12 >2,000*         Brief Urine Lab Results  (Last result in the past 365 days)        Color   Clarity   Blood   Leuk Est   Nitrite   Protein   CREAT   Urine HCG        09/04/23 1725 Dark Yellow   Turbid   Large (3+)   Large (3+)   Positive   100 mg/dL (2+)                 Microbiology Results (last 10 days)       Procedure Component Value - Date/Time    Blood Culture - Blood, Hand, Left [[id number]]  (Normal) Collected: 09/04/23 1743    Lab Status: Final result Specimen: Blood from Hand, Left Updated: 09/09/23 1801     Blood Culture No growth at 5 days    Urine Culture - Urine, Straight Cath [[id number]]  (Abnormal)  (Susceptibility) Collected: 09/04/23 1725    Lab Status: Final result Specimen: Urine from Straight Cath Updated: 09/07/23 1021     Urine Culture >100,000 CFU/mL Escherichia coli    Narrative:      Colonization of the urinary tract without infection is common. Treatment is discouraged unless the patient is symptomatic, pregnant, or undergoing an invasive urologic procedure.      Susceptibility        Escherichia coli      CHULA      Ampicillin Resistant      Ampicillin + Sulbactam Resistant      Cefazolin Susceptible      Cefepime Susceptible      Ceftazidime Susceptible      Ceftriaxone Susceptible      Gentamicin Susceptible      Levofloxacin Susceptible      Nitrofurantoin Susceptible      Piperacillin + Tazobactam Susceptible      Trimethoprim + Sulfamethoxazole Susceptible                           Blood Culture - Blood, Arm, Left [[id number]]  (Normal) Collected: 09/04/23 1704    Lab Status: Final result Specimen: Blood from Arm, Left Updated: 09/09/23 1745      "Blood Culture No growth at 5 days          Hospital Course: Ms. Gomes presented to [facility] emergency room 9/4/2023 with back pain. EMS had been contacted after patient found down at home. Patient was covered in stool and urine. Patient reported she had been on the ground for \"a couple of days\". She lives with her  and daughter and it is unknown how long patient had been on the floor. WBC 20.6, potassium 3.1, creatinine 1.66, CK6 176, lactate 2.1, lipase 7, urinalysis too numerous to count WBC, 4+ bacteria, positive nitrate. Chest x-ray negative, CT abdomen pelvis shows large volume stool in rectum and distal colon with differential of acute colitis versus stercoral colitis. CT lumbar spine negative for fracture CT T-spine negative for fracture CT head negative for acute process, CT C-spine negative for fracture, x-ray left shoulder negative for fracture. Lactated Ringer's fluid bolus, Rocephin given in ER.     She was admitted to the medical floor with acute UTI, acute kidney injury, nontraumatic rhabdomyolysis.  Urinalysis 13-20 WBC, 4+ bacteria on admission.  Rocephin started on admission.  Urine culture returned positive for E. coli sensitive to Rocephin.  Patient completed treatment with Rocephin during hospitalization.  Blood cultures remain no growth at 5 days.    Acute kidney injury on admission with creatinine 1.66.  Baseline creatinine 0.81 on 3/14/2023.  Patient was hydrated with IV fluids and creatinine improved. Creatinine returned to normal on 9/6/2023 and has remained stable throughout remainder of hospitalization.  Creatinine below baseline at 0.67 at discharge.    Nontraumatic rhabdomyolysis.  CK6 176, 528, 150, 49.    Acute cholecystitis with transaminitis.  AST and ALT normal on admission.  ,  on 9/6/2023.  Bilirubin 1.3 on 9/6.  Hepatitis panel negative.  Ultrasound gallbladder 9/6/2023 reported mildly distended gallbladder with borderline gallbladder wall " thickening.  Recommend correlation for early acute on chronic cholecystitis.  No gallstones noted.  ,  bilirubin 0.6 on 9/7.  General surgery consulted and Dr. Arciniega took her for laparoscopic cholecystectomy and intraoperative cholangiogram on 9/7.  No drains postoperative.  Discussed with Dr. Arciniega and diet advanced as tolerated.  LFTs continue to improve with AST 41 and  at discharge.  Bilirubin normal 0.4.    CT scan of the abdomen pelvis noted large volume stool in the rectum and distal colon with differential of acute colitis for stercoral colitis Flagyl started 9/5 and continued.  Bowel regimen continued.  Leukocytosis resolved.  Patient disimpacted at the time of surgery.  Patient had multiple bowel movements postoperatively.  Flagyl transitioned to oral at discharge.  Discussed with daughter Lola per telephone conversation on 9/9/2023 to maintain bowel regimen to avoid constipation as patient does take narcotics for chronic pain syndrome.  Encouraged to continue MiraLAX daily after discharge.    She has a history of normocytic anemia.  Hemoglobin 9.6 on admission trended down to 7.4 suspected dilutional effect from IV fluids.  No evidence of bright red bleeding or dark tarry stools noted.  Hemoglobin 7.7 on 9/8 postoperatively.  Hemoglobin improved to 9.2 on 9/9.    Patient has chronic pain syndrome with chronic prescription for opioids.  OxyContin home medication continued.    Coreg continued for primary hypertension.  Blood pressure 134/70.    Hypokalemia with potassium 2.9 on 9/5.  Potassium replaced.  Potassium 3.1 on 9/9 and patient received 40 mEq potassium x2 doses.  Follow-up potassium 3.8 on 9/10.  Magnesium level 1.6 on 9/9 and magnesium 2 g IV given.  Follow-up magnesium level 2.4 at discharge.    Patient has history of spinal stenosis, lumbar region without neurogenic claudication.  Physical therapy consulted and patient sat on side of the bed and took a few steps in the  "room.  Patient needed assistance with personal hygiene and toileting due to bowel movement.  Patient indicates she has a walker at home.  Discussed skilled nursing facility with both patient and daughter Lola and both patient and daughter declined.  Patient and daughter, Lola are agreeable to home health with physical therapy at discharge.  According to daughterLola patient only takes a very limited amount of steps using a walker in the home.  Discussed with patient and daughter that patient is at increased risk for fall but again, both declined skilled nursing facility.    Prevacid continued for GERD and Synthroid continued for hypothyroidism.  Lovenox and SCDs ordered for deep vein thrombosis prophylaxis.    On 9/10/2023, patient is stable for discharge.  Discussed skilled nursing facility with patient and daughter Lola and both declined.  However, patient and daughter are agreeable to home health with physical therapy and Occupational Therapy at discharge.  Patient will follow-up with Dr. Arciniega on 9/26/2023.  Follow-up with MORGAN Claros in 1 week.    Physical Exam on Discharge:  /70 (BP Location: Left arm, Patient Position: Sitting)   Pulse 70   Temp 97.8 °F (36.6 °C) (Oral)   Resp 16   Ht 157.5 cm (62.01\")   Wt 75.5 kg (166 lb 7.2 oz)   LMP  (LMP Unknown)   SpO2 98%   BMI 30.44 kg/m²   Physical Exam  Vitals and nursing note reviewed.   Constitutional:       Comments: Patient sitting up in chair.  No oxygen use.  No visitors in room.   HENT:      Head: Normocephalic and atraumatic.      Nose: No congestion.      Mouth/Throat:      Pharynx: Oropharynx is clear. No oropharyngeal exudate or posterior oropharyngeal erythema.   Eyes:      Extraocular Movements: Extraocular movements intact.      Pupils: Pupils are equal, round, and reactive to light.   Cardiovascular:      Rate and Rhythm: Normal rate and regular rhythm.      Heart sounds: No murmur heard.     Comments: Normal sinus rhythm " 71 on telemetry.  Pulmonary:      Breath sounds: No wheezing, rhonchi or rales.      Comments: No oxygen in use.  Abdominal:      General: There is no distension.      Palpations: Abdomen is soft.      Comments: Surgical port site dressings clean and dry   Genitourinary:     Comments: Voiding.  Musculoskeletal:         General: No swelling or tenderness.      Cervical back: Normal range of motion and neck supple.   Skin:     General: Skin is warm and dry.   Neurological:      General: No focal deficit present.      Mental Status: She is alert and oriented to person, place, and time.   Psychiatric:         Mood and Affect: Mood normal.         Behavior: Behavior normal.     Condition on Discharge: Stable for discharge home with home health and physical therapy.  Patient and daughter Lola declined SNF.    [place] [facility] [place] Medications:     Discharge Medications        New Medications        Instructions Start Date   metroNIDAZOLE 500 MG tablet  Commonly known as: FLAGYL   500 mg, Oral, Every 8 Hours Scheduled             Continue These Medications        Instructions Start Date   aspirin 81 MG chewable tablet   81 mg, Oral, Daily      atorvastatin 40 MG tablet  Commonly known as: LIPITOR   1 tablet, Oral, Daily      butalbital-acetaminophen-caffeine -40 MG per tablet  Commonly known as: FIORICET, ESGIC   1 tablet, Oral, 2 Times Daily      carvedilol 12.5 MG tablet  Commonly known as: COREG   12.5 mg, Oral, 2 Times Daily With Meals      cyclobenzaprine 10 MG tablet  Commonly known as: FLEXERIL   10 mg, Oral, 3 Times Daily PRN      donepezil 10 MG tablet  Commonly known as: ARICEPT   10 mg, Oral, Nightly      ergocalciferol 1.25 MG (50067 UT) capsule  Commonly known as: ERGOCALCIFEROL   50,000 Units, Oral, Weekly, Saturday      ipratropium 0.06 % nasal spray  Commonly known as: ATROVENT   2 sprays, Nasal, 2 Times Daily      LACTASE ENZYME PO   3 tablets, Oral, As Needed       lansoprazole 30 MG capsule  Commonly known as: PREVACID   30 mg, Oral, Every Morning      levothyroxine 50 MCG tablet  Commonly known as: SYNTHROID, LEVOTHROID   50 mcg, Oral, Every Morning      MiraLax 17 g packet  Generic drug: polyethylene glycol   17 g, Oral, Daily PRN      nystatin 287490 UNIT/GM cream  Commonly known as: MYCOSTATIN   1 application , Topical, 2 Times Daily      oxyCODONE 15 MG immediate release tablet  Commonly known as: ROXICODONE   15 mg, Oral, 4 Times Daily      sertraline 25 MG tablet  Commonly known as: ZOLOFT   25 mg, Oral, Daily      solifenacin 10 MG tablet  Commonly known as: VESICARE   1 tablet, Oral, Daily      SUMAtriptan 50 MG tablet  Commonly known as: IMITREX   50 mg, Oral, 2 Times Daily PRN      venlafaxine 25 MG tablet  Commonly known as: EFFEXOR   25 mg, Oral, Daily             Discharge Diet:   Diet Instructions       Diet: Regular/House Diet; Regular Texture (IDDSI 7); Thin (IDDSI 0)      Discharge Diet: Regular/House Diet    Texture: Regular Texture (IDDSI 7)    Fluid Consistency: Thin (IDDSI 0)          Activity at Discharge:   Activity Instructions       Activity as Tolerated            Discharge instructions:  1.  For recurrent fall seek medical attention.  2.  Complete Flagyl after discharge.  3.  Follow-up with MORGAN Claros in 1 week  4.  Home health with physical therapy at discharge  5.  Follow-up with Dr. Arciniega 9/26/2023    Follow-up Appointments:   Future Appointments   Date Time Provider Department Center   9/26/2023  2:00 PM Gerardo Arciniega MD [facility]   MORGAN Claros in 1 week      Test Results Pending at Discharge: None    Electronically signed by MORGAN Ramirez, 09/10/23, 07:55 CDT.    Time: 35 minutes.  Discussed with Dr. Kent, and patient.  Discussed with daughter Lola per telephone conversation on 9/9/2023.    The above documentation resulted from a face-to-face encounter by linette MORA, [facility].

## 2023-09-10 NOTE — THERAPY DISCHARGE NOTE
Acute Care - Occupational Therapy Discharge Summary  [facility]     Patient Name: Jennifer Gomes  : 1940  MRN: [id number]    Today's Date: 9/10/2023                 Admit Date: 2023        OT Recommendation and Plan    Visit Dx:    ICD-10-CM ICD-9-CM   1. Acute cystitis without hematuria  N30.00 595.0   2. Non-traumatic rhabdomyolysis  M62.82 728.88   3. TOMÁS (acute kidney injury)  N17.9 584.9   4. Acute congestive heart failure, unspecified heart failure type  I50.9 428.0   5. Failure to thrive in adult  R62.7 783.7   6. Fall, initial encounter  W19.XXXA E888.9   7. Dysphagia, unspecified type  R13.10 787.20   8. Acute cholecystitis  K81.0 575.0   9. Cholecystitis  K81.9 575.10   10. Impaired mobility [Z74.09 (ICD-10-CM)]  Z74.09 799.89   11. Decreased activities of daily living (ADL) [Z78.9 (ICD-10-CM)]  Z78.9 V49.89   12. Frequent falls  R29.6 V15.88   13. Obesity, Class III, BMI 40-49.9 (morbid obesity)  E66.01 278.01   14. Chronic pain syndrome  G89.4 338.4   15. Spinal stenosis, lumbar region, without neurogenic claudication  M48.061 724.02          Time Calculation- OT       Row Name 09/10/23 1536             Time Calculation- OT    OT Start Time 1040  -TS      OT Stop Time 1200  -TS      OT Time Calculation (min) 80 min  -TS      Total Timed Code Minutes- OT 80 minute(s)  -TS      OT Received On 09/10/23  -TS         Timed Charges    06211 - OT Self Care/Mgmt Minutes 80  -TS         Total Minutes    Timed Charges Total Minutes 80  -TS       Total Minutes 80  -TS                User Key  (r) = Recorded By, (t) = Taken By, (c) = Cosigned By      Initials Name Provider Type    TS Lisa Gamez COTA Occupational Therapist Assistant                       OT Rehab Goals       Row Name 09/10/23 1500             Transfer Goal 1 (OT)    Activity/Assistive Device (Transfer Goal 1, OT) sit-to-stand/stand-to-sit;bed-to-chair/chair-to-bed;commode, 3-in-1  -TS      [place] Level/Cues Needed  (Transfer Goal 1, OT) maximum assist (25-49% patient effort)  -TS      Time Frame (Transfer Goal 1, OT) long term goal (LTG);by discharge  -TS      Progress/Outcome (Transfer Goal 1, OT) goal met  -TS         Grooming Goal 1 (OT)    Activity/Device (Grooming Goal 1, OT) wash face, hands  -TS      [place] (Grooming Goal 1, OT) set-up required  -TS      Time Frame (Grooming Goal 1, OT) long term goal (LTG);by discharge  -TS      Progress/Outcome (Grooming Goal 1, OT) goal met  -TS         Self-Feeding Goal 1 (OT)    Activity/Device (Self-Feeding Goal 1, OT) finger foods;scoop food and bring to mouth;liquids to mouth  -TS      [place] Level/Cues Needed (Self-Feeding Goal 1, OT) set-up required  -TS      Time Frame (Self-Feeding Goal 1, OT) long term goal (LTG);by discharge  -TS      Progress/Outcomes (Self-Feeding Goal 1, OT) goal met  -TS                User Key  (r) = Recorded By, (t) = Taken By, (c) = Cosigned By      Initials Name Provider Type Discipline    TS Lisa Gamez COTA Occupational Therapist Assistant OT                        Timed Therapy Charges  Total Units: 5      Suggested Charges  Total Units: 5      Procedure Name Documented Minutes Units Code    HC OT SELF CARE/MGMT/TRAIN EA 15 MIN 80 5   65901 (CPT®)                 Documented Minutes  Total Minutes: 80      Therapy Provided Minutes    14911 - OT Self Care/Mgmt Minutes 80                    Therapy Charges for Today       Code Description Service Date Service Provider Modifiers Qty    41582512606 HC OT SELF CARE/MGMT/TRAIN EA 15 MIN 9/9/2023 Lisa Gamez COTA GO 3    95736992947 HC OT SELF CARE/MGMT/TRAIN EA 15 MIN 9/10/2023 Lisa Gamez COTA GO 5            OT Discharge Summary  Anticipated Discharge Disposition (OT): home with assist  Reason for Discharge: Discharge from facility  Outcomes Achieved: Refer to plan of care for updates on goals achieved  Discharge Destination: Home with assist, Home with home  health      Lisacandice Gamez, KOEHLER  9/10/2023

## 2023-09-10 NOTE — PROGRESS NOTES
LOS: 6 days   Patient Care Team:  Olivia Mora APRN as PCP - General (Nurse Practitioner)  [facility]  Eleazar Ramires MD as Cardiologist (Cardiology)  Shasta Madrigal MD as Referring Physician (Obstetrics and Gynecology)  Holly Chavez MD as Consulting Physician (General Surgery)    Chief Complaint: Acute and chronic cholecystitis    Subjective     Subjective     Postoperative day 3 status post laparoscopic cholecystectomy for treatment of acute on chronic cholecystitis, she is feeling better, she i states feels much improved.  She has been more active and has tolerated her a.m. meal.  She was seen by her admitting physician and plans for discharge been made today.    Objective      Objective     Vital Signs  Temp:  [97.6 °F (36.4 °C)-98.5 °F (36.9 °C)] 97.8 °F (36.6 °C)  Heart Rate:  [60-89] 70  Resp:  [16] 16  BP: (116-155)/(52-70) 134/70    Intake & Output (last 3 days)         09/07 0701 09/08 0700 09/08 0701 09/09 0700 09/09 0701  09/10 0700 09/10 0701  09/11 0700    P.O. 0 620 360     I.V. (mL/kg) 1965 (26)       IV Piggyback 450       Total Intake(mL/kg) 2415 (32) 620 (8.2) 360 (4.8)     Urine (mL/kg/hr) 400 (0.2)       Stool 0       Total Output 400       Net +2015 +620 +360             Urine Unmeasured Occurrence 2 x 4 x 6 x 1 x    Stool Unmeasured Occurrence 3 x 4 x 5 x 1 x            Physical Exam:        Abdomen:   Soft, normal bowel sounds, dressings are dry, no infection,           Results Review:     I reviewed the patient's new clinical results.  I reviewed the patient's new imaging results and agree with the interpretation.    Results from last 7 days   Lab Units 09/09/23  0420 09/08/23  0429 09/07/23  0506   WBC 10*3/mm3 10.65 8.24 8.39   HEMOGLOBIN g/dL 9.2* 7.7* 8.7*   HEMATOCRIT % 30.2* 24.9* 30.5*   PLATELETS 10*3/mm3 197 154 148          Results from last 7 days   Lab Units 09/10/23  0628 09/09/23  0420 09/08/23  0429   SODIUM mmol/L 142 142 140   POTASSIUM  mmol/L 3.8 3.1* 3.5   CHLORIDE mmol/L 111* 113* 113*   CO2 mmol/L 21.0* 19.0* 19.0*   BUN mg/dL 11 14 18   CREATININE mg/dL 0.67 0.60 0.72   CALCIUM mg/dL 8.9 8.9 8.5*   BILIRUBIN mg/dL 0.4 0.2 0.3   ALK PHOS U/L 231* 240* 290*   ALT (SGPT) U/L 120* 180* 267*   AST (SGOT) U/L 41* 82* 213*   GLUCOSE mg/dL 118* 107* 154*         Assessment/Plan     Assessment & Plan     With respect to discharge and follow-up with general surgery services as needed.  Plans have been made for discharge home with home health services.      Abdullahi Pearson MD  09/10/23  09:40 CDT

## 2023-09-10 NOTE — THERAPY TREATMENT NOTE
Acute Care - Occupational Therapy Treatment Note  [facility]     Patient Name: Jennifer Gomes  : 1940  MRN: [id number]  Today's Date: 9/10/2023             Admit Date: 2023       ICD-10-CM ICD-9-CM   1. Acute cystitis without hematuria  N30.00 595.0   2. Non-traumatic rhabdomyolysis  M62.82 728.88   3. TOMÁS (acute kidney injury)  N17.9 584.9   4. Acute congestive heart failure, unspecified heart failure type  I50.9 428.0   5. Failure to thrive in adult  R62.7 783.7   6. Fall, initial encounter  W19.XXXA E888.9   7. Dysphagia, unspecified type  R13.10 787.20   8. Acute cholecystitis  K81.0 575.0   9. Cholecystitis  K81.9 575.10   10. Impaired mobility [Z74.09 (ICD-10-CM)]  Z74.09 799.89   11. Decreased activities of daily living (ADL) [Z78.9 (ICD-10-CM)]  Z78.9 V49.89   12. Frequent falls  R29.6 V15.88   13. Obesity, Class III, BMI 40-49.9 (morbid obesity)  E66.01 278.01   14. Chronic pain syndrome  G89.4 338.4   15. Spinal stenosis, lumbar region, without neurogenic claudication  M48.061 724.02     Patient Active Problem List   Diagnosis    Gastroesophageal reflux disease    Spinal stenosis, lumbar region, without neurogenic claudication    Overweight    Non-smoker    Erosion of vaginal mesh    Adenocarcinoma of endometrium    Encounter for consultation    S/P hysterectomy with oophorectomy    Encounter for follow-up surveillance of endometrial cancer    History of radiation therapy    Obesity (BMI 30-39.9)    Non-traumatic rhabdomyolysis    Metabolic encephalopathy    Acute renal failure superimposed on stage 3 chronic kidney disease    Acute respiratory failure with hypoxia and hypercapnia    Medical non-compliance, does not take narcotics as prescribed    SIRS (systemic inflammatory response syndrome)    Chronic constipation    Chronic pain syndrome    Chronic prescription opiate use    Normocytic anemia    Hypothyroidism (acquired)    Essential hypertension    TOMÁS (acute kidney injury)    Venous  insufficiency of both lower extremities    Fluid retention    Low blood pressure reading    Obesity, Class III, BMI 40-49.9 (morbid obesity)    Chronic intractable headache    RAFAL (obstructive sleep apnea)    Change in bowel habits    Altered mental status    Toxic metabolic encephalopathy    Polypharmacy    Frequent falls    Grade III internal hemorrhoids    External hemorrhoids    E. coli UTI    Colitis    Moderate malnutrition    Transaminitis    Acute cholecystitis     Past Medical History:   Diagnosis Date    Anxiety     Arthritis     Bronchitis     Cancer     uterine    Chronic pain     Depression     Disease of thyroid gland     Fibromyalgia     GERD (gastroesophageal reflux disease)     Headache     History of transfusion     AS     Hyperlipidemia     Hypertension     Incontinence     Insomnia     Leg pain     Lumbar stenosis     Migraines     Peptic ulcer     Restless legs     Sleep apnea     NO C-PAP    UTI (urinary tract infection)     Vaginal bleeding      Past Surgical History:   Procedure Laterality Date    BLADDER REPAIR      MESH HAD TO BE REMOVED IN 2013    BREAST BIOPSY Right 2017    benign    BREAST CYST EXCISION Left     CARDIAC CATHETERIZATION      CARPAL TUNNEL RELEASE      CATARACT EXTRACTION W/ INTRAOCULAR LENS  IMPLANT, BILATERAL      CHOLECYSTECTOMY WITH INTRAOPERATIVE CHOLANGIOGRAM N/A 2023    Procedure: CHOLECYSTECTOMY LAPAROSCOPIC INTRAOPERATIVE CHOLANGIOGRAM;  Surgeon: Gerardo Arciniega MD;  Location: [facility] OR;  Service: General;  Laterality: N/A;    COLONOSCOPY      COLONOSCOPY N/A 10/01/2021    Procedure: COLONOSCOPY WITH ANESTHESIA;  Surgeon: Tom Velasco DO;  Location: [facility] ENDOSCOPY;  Service: Gastroenterology;  Laterality: N/A;  pre: change in bowel habits  post: diverticulosis. hemorrhoids.   Olivia Mora APRN        CYSTECTOMY      D & C HYSTEROSCOPY N/A 2017    Procedure: DILATATION AND CURETTAGE HYSTEROSCOPY;  Surgeon: Shasta Madrigal  MD;  Location: [facility] OR;  Service:     DILATION AND CURETTAGE, DIAGNOSTIC / THERAPEUTIC  2008    ENDOSCOPY  09/23/2010    Short segment of Arriola's,Moderate chroninc esophagogastritis and negative H.pylori    ENDOSCOPY N/A 09/25/2017    Procedure: ESOPHAGOGASTRODUODENOSCOPY WITH ANESTHESIA;  Surgeon: Tom Velasco DO;  Location: [facility] ENDOSCOPY;  Service:     EYE SURGERY      RETINA    HEMORRHOIDECTOMY SIGMOIDOSCOPY N/A 3/21/2023    Procedure: HEMORRHOIDECTOMY WITH EXAM UNDER ANESTHESIA;  Surgeon: Holly Chavez MD;  Location: [facility] OR;  Service: General;  Laterality: N/A;    HYSTERECTOMY  12/20/2017    ORIF TIBIA/FIBULA FRACTURES Left 2000    TRANSVAGINAL TAPING SUSPENSION N/A 11/06/2017    Procedure: VAGINAL MESH REVISION;  Surgeon: Shatsa Madrigal MD;  Location: [facility] OR;  Service:     VAGINAL MESH REVISION  2013         OT ASSESSMENT FLOWSHEET (last 12 hours)       OT Evaluation and Treatment       Row Name 09/10/23 1040                   OT Time and Intention    Subjective Information complains of;weakness;fatigue  -TS        Document Type therapy note (daily note)  -TS        Mode of Treatment occupational therapy  -TS        Patient Effort good  -TS           General Information    Existing Precautions/Restrictions fall  -TS           Pain Assessment    Pretreatment Pain Rating 0/10 - no pain  -TS        Posttreatment Pain Rating 0/10 - no pain  -TS           Cognition    Personal Safety Interventions fall prevention program maintained;gait belt;nonskid shoes/slippers when out of bed  -TS           Activities of Daily Living    BADL Assessment/Intervention bathing;upper body dressing;lower body dressing;toileting  -TS           Bathing Assessment/Intervention    [place] Level (Bathing) upper body;lower body;set up  -TS        Assistive Devices (Bathing) hand-held shower spray hose;grab bar, tub/shower;tub bench  -TS        Position (Bathing) unsupported sitting  -TS           Upper Body  Dressing Assessment/Training    [place] Level (Upper Body Dressing) don;doff;pull-over garment;set up;minimum assist (75% patient effort)  -TS        Position (Upper Body Dressing) unsupported sitting  -TS           Lower Body Dressing Assessment/Training    [place] Level (Lower Body Dressing) don;doff;pants/bottoms;socks;maximum assist (25% patient effort)  -TS        Position (Lower Body Dressing) unsupported sitting;supported standing  -TS           Toileting Assessment/Training    [place] Level (Toileting) perform perineal hygiene;modified independence;maximum assist (25% patient effort)  -TS        Assistive Devices (Toileting) commode, bedside without drop arms  -TS        Position (Toileting) supported sitting;supported standing  -TS           Bed Mobility    Supine-Sit [place] (Bed Mobility) standby assist  -TS        Sit-Supine [place] (Bed Mobility) standby assist  -TS           Transfer Assessment/Treatment    Transfers sit-stand transfer;stand-sit transfer;stand pivot/stand step transfer;toilet transfer  -TS           Sit-Stand Transfer    Sit-Stand [place] (Transfers) standby assist  -TS        Assistive Device (Sit-Stand Transfers) walker, front-wheeled  -TS           Stand-Sit Transfer    Stand-Sit [place] (Transfers) contact guard  -TS        Assistive Device (Stand-Sit Transfers) walker, front-wheeled  -TS           Stand Pivot/Stand Step Transfer    Stand Pivot/Stand Step [place] (Transfers) contact guard  -TS        Assistive Device (Stand Pivot Stand Step Transfer) walker, front-wheeled  -TS           Toilet Transfer    Type (Toilet Transfer) sit-stand;stand-sit  -TS        [place] Level (Toilet Transfer) contact guard  -TS        Assistive Device (Toilet Transfer) commode, bedside without drop arms;walker, front-wheeled  -TS           Wound 09/07/23 1341 abdomen Incision    Wound - Properties Group Placement Date: 09/07/23  -ZE Placement Time: 1341   -ZE Present on Hospital Admission: N  -ZE Location: abdomen  -ZE Primary Wound Type: Incision  -ZE    Retired Wound - Properties Group Placement Date: 09/07/23  -ZE Placement Time: 1341 -ZE Present on Hospital Admission: N  -ZE Location: abdomen  -ZE Primary Wound Type: Incision  -ZE    Retired Wound - Properties Group Date first assessed: 09/07/23  -ZE Time first assessed: 1341  -ZE Present on Hospital Admission: N  -ZE Location: abdomen  -ZE Primary Wound Type: Incision  -ZE       Plan of Care Review    Plan of Care Reviewed With patient  -TS           Positioning and Restraints    Pre-Treatment Position in bed  -TS        Post Treatment Position bed  -TS        In Bed fowlers;call light within reach;encouraged to call for assist;side rails up x3  -TS                  User Key  (r) = Recorded By, (t) = Taken By, (c) = Cosigned By      Initials Name Effective Dates    TS Lisa Gamez COTA 02/03/23 -     Aubrey Littlejohn RN 02/17/22 -                      Occupational Therapy Education       Title: PT OT SLP Therapies (In Progress)       Topic: Occupational Therapy (In Progress)       Point: ADL training (Done)       Description:   Instruct learner(s) on proper safety adaptation and remediation techniques during self care or transfers.   Instruct in proper use of assistive devices.                  Learning Progress Summary             Patient Acceptance, E,D, VU,NR by  at 9/6/2023 1017                         Point: Home exercise program (Not Started)       Description:   Instruct learner(s) on appropriate technique for monitoring, assisting and/or progressing therapeutic exercises/activities.                  Learner Progress:  Not documented in this visit.              Point: Precautions (Not Started)       Description:   Instruct learner(s) on prescribed precautions during self-care and functional transfers.                  Learner Progress:  Not documented in this visit.              Point:  Body mechanics (Done)       Description:   Instruct learner(s) on proper positioning and spine alignment during self-care, functional mobility activities and/or exercises.                  Learning Progress Summary             Patient Acceptance, E,D, VU,NR by  at 9/6/2023 1017                                         User Key       Initials Effective Dates Name Provider Type Discipline     07/11/23 -  Jennifer Briceno, OTR/L Occupational Therapist OT                      OT Recommendation and Plan     Plan of Care Review  Plan of Care Reviewed With: patient  Progress: improving  Outcome Evaluation: Pt CGA/min A for transfers and ambulating short distances with RW. Pt mod/max A for LB dressing and hygiene post toileting due to bowel movement. Pt would benefit from continued rehab prior to returning home due to pt having decreased strength and endurance. She is at great risk of falling if she chooses to return home without discharging to rehab first. Continue OT POC  Plan of Care Reviewed With: patient  Outcome Evaluation: Pt CGA/min A for transfers and ambulating short distances with RW. Pt mod/max A for LB dressing and hygiene post toileting due to bowel movement. Pt would benefit from continued rehab prior to returning home due to pt having decreased strength and endurance. She is at great risk of falling if she chooses to return home without discharging to rehab first. Continue OT POC        Time Calculation:    Time Calculation- OT       Row Name 09/10/23 1536             Time Calculation- OT    OT Start Time 1040  -TS      OT Stop Time 1200  -TS      OT Time Calculation (min) 80 min  -TS      Total Timed Code Minutes- OT 80 minute(s)  -TS      OT Received On 09/10/23  -TS         Timed Charges    70763 - OT Self Care/Mgmt Minutes 80  -TS         Total Minutes    Timed Charges Total Minutes 80  -TS       Total Minutes 80  -TS                User Key  (r) = Recorded By, (t) = Taken By, (c) = Cosigned By       Initials Name Provider Type     Lisa Gamez COTA Occupational Therapist Assistant                  Therapy Charges for Today       Code Description Service Date Service Provider Modifiers Qty    79359602137 HC OT SELF CARE/MGMT/TRAIN EA 15 MIN 9/9/2023 Lisa Gamez COTA GO 3    49025498929 HC OT SELF CARE/MGMT/TRAIN EA 15 MIN 9/10/2023 Lisa Gamez COTA GO 5                 Lisa CABEZAS. ZAKIA Gamez  9/10/2023

## 2023-09-10 NOTE — THERAPY TREATMENT NOTE
Acute Care - Occupational Therapy Treatment Note  [facility]     Patient Name: Jennifer Gomes  : 1940  MRN: [id number]  Today's Date: 9/10/2023             Admit Date: 2023       ICD-10-CM ICD-9-CM   1. Acute cystitis without hematuria  N30.00 595.0   2. Non-traumatic rhabdomyolysis  M62.82 728.88   3. TOMÁS (acute kidney injury)  N17.9 584.9   4. Acute congestive heart failure, unspecified heart failure type  I50.9 428.0   5. Failure to thrive in adult  R62.7 783.7   6. Fall, initial encounter  W19.XXXA E888.9   7. Dysphagia, unspecified type  R13.10 787.20   8. Acute cholecystitis  K81.0 575.0   9. Cholecystitis  K81.9 575.10   10. Impaired mobility [Z74.09 (ICD-10-CM)]  Z74.09 799.89   11. Decreased activities of daily living (ADL) [Z78.9 (ICD-10-CM)]  Z78.9 V49.89   12. Frequent falls  R29.6 V15.88   13. Obesity, Class III, BMI 40-49.9 (morbid obesity)  E66.01 278.01   14. Chronic pain syndrome  G89.4 338.4   15. Spinal stenosis, lumbar region, without neurogenic claudication  M48.061 724.02     Patient Active Problem List   Diagnosis    Gastroesophageal reflux disease    Spinal stenosis, lumbar region, without neurogenic claudication    Overweight    Non-smoker    Erosion of vaginal mesh    Adenocarcinoma of endometrium    Encounter for consultation    S/P hysterectomy with oophorectomy    Encounter for follow-up surveillance of endometrial cancer    History of radiation therapy    Obesity (BMI 30-39.9)    Non-traumatic rhabdomyolysis    Metabolic encephalopathy    Acute renal failure superimposed on stage 3 chronic kidney disease    Acute respiratory failure with hypoxia and hypercapnia    Medical non-compliance, does not take narcotics as prescribed    SIRS (systemic inflammatory response syndrome)    Chronic constipation    Chronic pain syndrome    Chronic prescription opiate use    Normocytic anemia    Hypothyroidism (acquired)    Essential hypertension    TOMÁS (acute kidney injury)    Venous  insufficiency of both lower extremities    Fluid retention    Low blood pressure reading    Obesity, Class III, BMI 40-49.9 (morbid obesity)    Chronic intractable headache    RAFAL (obstructive sleep apnea)    Change in bowel habits    Altered mental status    Toxic metabolic encephalopathy    Polypharmacy    Frequent falls    Grade III internal hemorrhoids    External hemorrhoids    E. coli UTI    Colitis    Moderate malnutrition    Transaminitis    Acute cholecystitis     Past Medical History:   Diagnosis Date    Anxiety     Arthritis     Bronchitis     Cancer     uterine    Chronic pain     Depression     Disease of thyroid gland     Fibromyalgia     GERD (gastroesophageal reflux disease)     Headache     History of transfusion     AS     Hyperlipidemia     Hypertension     Incontinence     Insomnia     Leg pain     Lumbar stenosis     Migraines     Peptic ulcer     Restless legs     Sleep apnea     NO C-PAP    UTI (urinary tract infection)     Vaginal bleeding      Past Surgical History:   Procedure Laterality Date    BLADDER REPAIR      MESH HAD TO BE REMOVED IN 2013    BREAST BIOPSY Right 2017    benign    BREAST CYST EXCISION Left     CARDIAC CATHETERIZATION      CARPAL TUNNEL RELEASE      CATARACT EXTRACTION W/ INTRAOCULAR LENS  IMPLANT, BILATERAL      CHOLECYSTECTOMY WITH INTRAOPERATIVE CHOLANGIOGRAM N/A 2023    Procedure: CHOLECYSTECTOMY LAPAROSCOPIC INTRAOPERATIVE CHOLANGIOGRAM;  Surgeon: Gerardo Arciniega MD;  Location: [facility] OR;  Service: General;  Laterality: N/A;    COLONOSCOPY      COLONOSCOPY N/A 10/01/2021    Procedure: COLONOSCOPY WITH ANESTHESIA;  Surgeon: Tom Velasco DO;  Location: [facility] ENDOSCOPY;  Service: Gastroenterology;  Laterality: N/A;  pre: change in bowel habits  post: diverticulosis. hemorrhoids.   Olivia Mora APRN        CYSTECTOMY      D & C HYSTEROSCOPY N/A 2017    Procedure: DILATATION AND CURETTAGE HYSTEROSCOPY;  Surgeon: Shasta Madrigal  MD;  Location: [facility] OR;  Service:     DILATION AND CURETTAGE, DIAGNOSTIC / THERAPEUTIC  2008    ENDOSCOPY  09/23/2010    Short segment of Arriola's,Moderate chroninc esophagogastritis and negative H.pylori    ENDOSCOPY N/A 09/25/2017    Procedure: ESOPHAGOGASTRODUODENOSCOPY WITH ANESTHESIA;  Surgeon: Tom Velasco DO;  Location: [facility] ENDOSCOPY;  Service:     EYE SURGERY      RETINA    HEMORRHOIDECTOMY SIGMOIDOSCOPY N/A 3/21/2023    Procedure: HEMORRHOIDECTOMY WITH EXAM UNDER ANESTHESIA;  Surgeon: Holly Chavez MD;  Location: [facility] OR;  Service: General;  Laterality: N/A;    HYSTERECTOMY  12/20/2017    ORIF TIBIA/FIBULA FRACTURES Left 2000    TRANSVAGINAL TAPING SUSPENSION N/A 11/06/2017    Procedure: VAGINAL MESH REVISION;  Surgeon: Shasta Madrigal MD;  Location: [facility] OR;  Service:     VAGINAL MESH REVISION  2013         OT ASSESSMENT FLOWSHEET (last 12 hours)       OT Evaluation and Treatment       Row Name 09/10/23 1040                   OT Time and Intention    Subjective Information complains of;weakness;fatigue  -TS        Document Type therapy note (daily note)  -TS        Mode of Treatment occupational therapy  -TS        Patient Effort good  -TS           General Information    Existing Precautions/Restrictions fall  -TS           Pain Assessment    Pretreatment Pain Rating 0/10 - no pain  -TS        Posttreatment Pain Rating 0/10 - no pain  -TS           Cognition    Personal Safety Interventions fall prevention program maintained;gait belt;nonskid shoes/slippers when out of bed  -TS           Activities of Daily Living    BADL Assessment/Intervention bathing;upper body dressing;lower body dressing;toileting  -TS           Bathing Assessment/Intervention    [place] Level (Bathing) upper body;lower body;set up  -TS        Assistive Devices (Bathing) hand-held shower spray hose;grab bar, tub/shower;tub bench  -TS        Position (Bathing) unsupported sitting  -TS           Upper Body  Dressing Assessment/Training    [place] Level (Upper Body Dressing) don;doff;pull-over garment;set up;minimum assist (75% patient effort)  -TS        Position (Upper Body Dressing) unsupported sitting  -TS           Lower Body Dressing Assessment/Training    [place] Level (Lower Body Dressing) don;doff;pants/bottoms;socks;maximum assist (25% patient effort)  -TS        Position (Lower Body Dressing) unsupported sitting;supported standing  -TS           Toileting Assessment/Training    [place] Level (Toileting) perform perineal hygiene;modified independence;maximum assist (25% patient effort)  -TS        Assistive Devices (Toileting) commode, bedside without drop arms  -TS        Position (Toileting) supported sitting;supported standing  -TS           Bed Mobility    Supine-Sit [place] (Bed Mobility) standby assist  -TS        Sit-Supine [place] (Bed Mobility) standby assist  -TS           Transfer Assessment/Treatment    Transfers sit-stand transfer;stand-sit transfer;stand pivot/stand step transfer;toilet transfer  -TS           Sit-Stand Transfer    Sit-Stand [place] (Transfers) standby assist  -TS        Assistive Device (Sit-Stand Transfers) walker, front-wheeled  -TS           Stand-Sit Transfer    Stand-Sit [place] (Transfers) contact guard  -TS        Assistive Device (Stand-Sit Transfers) walker, front-wheeled  -TS           Stand Pivot/Stand Step Transfer    Stand Pivot/Stand Step [place] (Transfers) contact guard  -TS        Assistive Device (Stand Pivot Stand Step Transfer) walker, front-wheeled  -TS           Toilet Transfer    Type (Toilet Transfer) sit-stand;stand-sit  -TS        [place] Level (Toilet Transfer) contact guard  -TS        Assistive Device (Toilet Transfer) commode, bedside without drop arms;walker, front-wheeled  -TS           Wound 09/07/23 1341 abdomen Incision    Wound - Properties Group Placement Date: 09/07/23  -ZE Placement Time: 1341   -ZE Present on Hospital Admission: N  -ZE Location: abdomen  -ZE Primary Wound Type: Incision  -ZE    Retired Wound - Properties Group Placement Date: 09/07/23  -ZE Placement Time: 1341 -ZE Present on Hospital Admission: N  -ZE Location: abdomen  -ZE Primary Wound Type: Incision  -ZE    Retired Wound - Properties Group Date first assessed: 09/07/23  -ZE Time first assessed: 1341  -ZE Present on Hospital Admission: N  -ZE Location: abdomen  -ZE Primary Wound Type: Incision  -ZE       Plan of Care Review    Plan of Care Reviewed With patient  -TS           Positioning and Restraints    Pre-Treatment Position in bed  -TS        Post Treatment Position bed  -TS        In Bed fowlers;call light within reach;encouraged to call for assist;side rails up x3  -TS                  User Key  (r) = Recorded By, (t) = Taken By, (c) = Cosigned By      Initials Name Effective Dates    TS Lisa Gamez COTA 02/03/23 -     Aubrey Littlejohn RN 02/17/22 -                      Occupational Therapy Education       Title: PT OT SLP Therapies (Resolved)       Topic: Occupational Therapy (Resolved)       Point: ADL training (Resolved)       Description:   Instruct learner(s) on proper safety adaptation and remediation techniques during self care or transfers.   Instruct in proper use of assistive devices.                  Learning Progress Summary             Patient Acceptance, E,D, VU,NR by  at 9/6/2023 1017                         Point: Home exercise program (Resolved)       Description:   Instruct learner(s) on appropriate technique for monitoring, assisting and/or progressing therapeutic exercises/activities.                  Learner Progress:  Not documented in this visit.              Point: Precautions (Resolved)       Description:   Instruct learner(s) on prescribed precautions during self-care and functional transfers.                  Learner Progress:  Not documented in this visit.              Point: Body  mechanics (Resolved)       Description:   Instruct learner(s) on proper positioning and spine alignment during self-care, functional mobility activities and/or exercises.                  Learning Progress Summary             Patient Acceptance, E,D, VU,NR by  at 9/6/2023 1017                                         User Key       Initials Effective Dates Name Provider Type Discipline     07/11/23 -  Jennifer Briceno, OTR/L Occupational Therapist OT                      OT Recommendation and Plan     Plan of Care Review  Plan of Care Reviewed With: patient  Progress: improving  Outcome Evaluation: Pt CGA/min A for transfers and ambulating short distances with RW. Pt mod/max A for LB dressing and hygiene post toileting due to bowel movement. Pt would benefit from continued rehab prior to returning home due to pt having decreased strength and endurance. She is at great risk of falling if she chooses to return home without discharging to rehab first. Continue OT POC  Plan of Care Reviewed With: patient  Outcome Evaluation: Pt CGA/min A for transfers and ambulating short distances with RW. Pt mod/max A for LB dressing and hygiene post toileting due to bowel movement. Pt would benefit from continued rehab prior to returning home due to pt having decreased strength and endurance. She is at great risk of falling if she chooses to return home without discharging to rehab first. Continue OT POC        Time Calculation:    Time Calculation- OT       Row Name 09/10/23 1536             Time Calculation- OT    OT Start Time 1040  -TS      OT Stop Time 1200  -TS      OT Time Calculation (min) 80 min  -TS      Total Timed Code Minutes- OT 80 minute(s)  -TS      OT Received On 09/10/23  -TS         Timed Charges    55454 - OT Self Care/Mgmt Minutes 80  -TS         Total Minutes    Timed Charges Total Minutes 80  -TS       Total Minutes 80  -TS                User Key  (r) = Recorded By, (t) = Taken By, (c) = Cosigned By       Initials Name Provider Type     Lisa Gamez COTA Occupational Therapist Assistant                  Therapy Charges for Today       Code Description Service Date Service Provider Modifiers Qty    91546288908 HC OT SELF CARE/MGMT/TRAIN EA 15 MIN 9/9/2023 Lisa Gamez COTA GO 3    36542904523 HC OT SELF CARE/MGMT/TRAIN EA 15 MIN 9/10/2023 Lisa Gamez COTA GO 5                 Lisa CABEZAS. ZAKIA Gamez  9/10/2023

## 2023-09-10 NOTE — THERAPY DISCHARGE NOTE
Acute Care - Physical Therapy Discharge Summary  [facility]       Patient Name: Jennifer Gomes  : 1940  MRN: [id number]    Today's Date: 9/10/2023                 Admit Date: 2023      PT Recommendation and Plan    Visit Dx:    ICD-10-CM ICD-9-CM   1. Acute cystitis without hematuria  N30.00 595.0   2. Non-traumatic rhabdomyolysis  M62.82 728.88   3. TOMÁS (acute kidney injury)  N17.9 584.9   4. Acute congestive heart failure, unspecified heart failure type  I50.9 428.0   5. Failure to thrive in adult  R62.7 783.7   6. Fall, initial encounter  W19.XXXA E888.9   7. Dysphagia, unspecified type  R13.10 787.20   8. Acute cholecystitis  K81.0 575.0   9. Cholecystitis  K81.9 575.10   10. Impaired mobility [Z74.09 (ICD-10-CM)]  Z74.09 799.89   11. Decreased activities of daily living (ADL) [Z78.9 (ICD-10-CM)]  Z78.9 V49.89   12. Frequent falls  R29.6 V15.88   13. Obesity, Class III, BMI 40-49.9 (morbid obesity)  E66.01 278.01   14. Chronic pain syndrome  G89.4 338.4   15. Spinal stenosis, lumbar region, without neurogenic claudication  M48.061 724.02                PT Rehab Goals       Row Name 09/10/23 1605             Bed Mobility Goal 1 (PT)    Activity/Assistive Device (Bed Mobility Goal 1, PT) sit to supine;supine to sit;scooting  -WK      [place] Level/Cues Needed (Bed Mobility Goal 1, PT) contact guard required  -WK      Time Frame (Bed Mobility Goal 1, PT) long term goal (LTG);by discharge  -WK      Progress/Outcomes (Bed Mobility Goal 1, PT) goal met  -WK         Transfer Goal 1 (PT)    Activity/Assistive Device (Transfer Goal 1, PT) sit-to-stand/stand-to-sit;bed-to-chair/chair-to-bed;walker, rolling  -WK      [place] Level/Cues Needed (Transfer Goal 1, PT) moderate assist (50-74% patient effort)  -WK      Time Frame (Transfer Goal 1, PT) long term goal (LTG);by discharge  -WK      Progress/Outcome (Transfer Goal 1, PT) goal met  -WK         Gait Training Goal 1 (PT)    Activity/Assistive  Device (Gait Training Goal 1, PT) gait (walking locomotion);assistive device use;decrease fall risk;improve balance and speed;increase endurance/gait distance;walker, rolling  -WK      [place] Level (Gait Training Goal 1, PT) standby assist  -WK      Distance (Gait Training Goal 1, PT) 30  -WK      Time Frame (Gait Training Goal 1, PT) long term goal (LTG);by discharge  -WK      Progress/Outcome (Gait Training Goal 1, PT) goal met  -WK         Problem Specific Goal 1 (PT)    Problem Specific Goal 1 (PT) Patient will demonstrate improved and safe posture marked by the ability to stand without significant weight on FWW without cueing  -WK      Time Frame (Problem Specific Goal 1, PT) long-term goal (LTG);by discharge  -WK      Progress/Outcome (Problem Specific Goal 1, PT) goal not met  -WK                User Key  (r) = Recorded By, (t) = Taken By, (c) = Cosigned By      Initials Name Provider Type Discipline    WK Cheryl Hazel PTA Physical Therapist Assistant PT                        PT Discharge Summary  Anticipated Discharge Disposition (PT): home with home health  Reason for Discharge: Discharge from facility  Outcomes Achieved: Refer to plan of care for updates on goals achieved  Discharge Destination: Home with home health      Cheryl Hazel PTA   9/10/2023

## 2023-09-10 NOTE — CASE MANAGEMENT/SOCIAL WORK
Continued Stay Note   [place]     Patient Name: Jennifer Gomes  MRN: [id number]  Today's Date: 9/10/2023    Admit Date: 9/4/2023    Plan: Home with    Discharge Plan       Row Name 09/10/23 0949       Plan    Plan Home with HH    Patient/Family in Agreement with Plan yes    Provided Post Acute Provider List? Yes    Post Acute Provider List Home Health    Provided Post Acute Provider Quality & Resource List? Yes    Post Acute Provider Quality and Resource List Home Health    Delivered To Support Person    Support Person Daughter - Ivonne Liang    Method of Delivery Telephone    Plan Comments Patient is discharging today and has orders for HH.  [facility] therapy is full next week.  [facility] is limited on what Medicare replacement policies they accept.  Referral being made to [facility] .  BRITTANI has left voicemail for patient's daughter to advise of HH set up.  BRITTANI has also left a voicemail for Blanka with [phone] to advise of discharge.    Final Discharge Disposition Code 06 - home with home health care    Final Note Home with [facility]                   Discharge Codes    No documentation.                 Expected Discharge Date and Time       Expected Discharge Date Expected Discharge Time    Sep 10, 2023               JEFRY Wood

## 2023-09-11 ENCOUNTER — TELEPHONE (OUTPATIENT)
Dept: INTERNAL MEDICINE | Age: 83
End: 2023-09-11

## 2023-09-11 ENCOUNTER — TELEPHONE (OUTPATIENT)
Dept: INTERNAL MEDICINE CLINIC | Age: 83
End: 2023-09-11

## 2023-09-11 NOTE — TELEPHONE ENCOUNTER
[address] [place] [facility]"" has a referral from [address] [facility] APRN follow pt for [address] [facility],[address] ?

## 2023-09-11 NOTE — TELEPHONE ENCOUNTER
[address] [facility] Transitions Initial Follow Up Call    Outreach made within 2 business days of discharge: Yes    Patient: Jin Seay   Patient : 1940 MRN: [id number]    Reason for Admission: Fall    Discharge Date: 09/10/2023     Discharge Diagnoses: Active Hospital Problems   Diagnosis   **E. coli UTI   Acute cholecystitis   GUSTAVO (acute kidney injury)   Moderate malnutrition   Transaminitis   Colitis   Chronic pain syndrome   Chronic prescription opiate use   Hypothyroidism (acquired)   Essential hypertension   Non-traumatic rhabdomyolysis   Spinal stenosis, lumbar region, without neurogenic claudication   Gastroesophageal reflux disease       Spoke with: Patient    Discharge department/facility: [address] [place] [address]    TCM Interactive Patient Contact:  Was patient able to fill all prescriptions: Yes  Was patient instructed to bring all medications to the follow-up visit: Yes  Is patient taking all medications as directed in the discharge summary? Yes  Does patient understand their discharge instructions: Yes  Does patient have questions or concerns that need addressed prior to 7-14 day follow up office visit: no    Spoke to the patient she is having severed Diarrhea. There is another message in the chart about her taking Imodium I did go ahead and give her the instructions on that. Pt  is doing ok other than the diarrhea. Pt is scheduled for 23 for TCM. PT is getting home health and she is not eating much at this time. Scheduled appointment with PCP within 7-14 days    Follow Up  No future appointments.     Nir Gayle MA

## 2023-09-11 NOTE — PAYOR COMM NOTE
"REF:  [id number]   [facility]  FAX  [phone]      Jennifer Soliman (82 y.o. Female)       Date of Birth   1940    Social Security Number       Address   [address] [facility] [address]    Home Phone   [phone]    MRN   [id number]       Anglican   Taoism    Marital Status                               Admission Date   9/4/23    Admission Type   Emergency    Admitting Provider   Dheeraj Kent MD    Attending Provider       Department, Room/Bed   [facility] 3C, 362/1       Discharge Date   9/10/2023    Discharge Disposition   Home-Health Care [facility]    Discharge Destination                                 Attending Provider: (none)   Allergies: Ropinirole Hcl, Codeine, Definity [Perflutren Lipid Microsphere], Ambien [Zolpidem], Eszopiclone, Pregabalin, Tizanidine    Isolation: None   Infection: None   Code Status: Prior    Ht: 157.5 cm (62.01\")   Wt: 75.5 kg (166 lb 7.2 oz)    Admission Cmt: None   Principal Problem: E. coli UTI [N39.0,B96.20]                   Active Insurance as of 9/4/2023       Primary Coverage       Payor Plan Insurance Group Employer/Plan Group    [facility] MEDICARE REPLACEMENT [facility] MEDICARE REPLACEMENT [id number]       Payor Plan Address Payor Plan Phone Number Payor Plan Fax Number Effective Dates    [address] [address]   1/1/2017 - None Entered    [facility] [id number]         Subscriber Name Subscriber Birth Date Member ID       JENNIFER SOLIMAN 1940 [id number]                     Emergency Contacts        (Rel.) Home Phone Work Phone Mobile Phone    SolimanAdams harry (Spouse) [phone] -- [phone]    LiangIvonne schultz (Daughter) [phone] -- --                 Discharge Summary        Steff Bowen, APRN at 09/10/23 0736       Attestation signed by Dheeraj Kent MD at 09/10/23 8218    I have reviewed this documentation and agree.                        [facility] " Services  DISCHARGE SUMMARY     Date of Admission: 9/4/2023  Date of Discharge:  9/10/2023  Primary Care Physician: Olivia Mora APRN    Presenting Problem/History of Present Illness:  Back pain    Final Discharge Diagnoses:  Active Hospital Problems    Diagnosis     **E. coli UTI     Acute cholecystitis     TOMÁS (acute kidney injury)     Moderate malnutrition     Transaminitis     Colitis     Chronic pain syndrome     Chronic prescription opiate use     Hypothyroidism (acquired)     Essential hypertension     Non-traumatic rhabdomyolysis     Spinal stenosis, lumbar region, without neurogenic claudication     Gastroesophageal reflux disease      Consults:   Dr. Arciniega, general surgery    Procedures Performed: Laparoscopic cholecystectomy and intraoperative cholangiogram 9/7/2023 Dr. Arciniega    Pertinent Test Results:   Results for orders placed during the hospital encounter of 09/04/23    Adult Transthoracic Echo Complete W/ Cont if Necessary Per Protocol    Interpretation Summary    Left ventricular systolic function is normal. Left ventricular ejection fraction appears to be 56 - 60%.    Left ventricular wall thickness is consistent with moderate concentric hypertrophy.    Left ventricular diastolic function is consistent with (grade Ia w/high LAP) impaired relaxation.    The left atrial cavity is moderate to severely dilated.    Left atrial volume is moderately increased.    The right atrial cavity is borderline dilated.    Estimated right ventricular systolic pressure from tricuspid regurgitation is mildly elevated (35-45 mmHg).    Imaging Results (All)       Procedure Component Value Units Date/Time    FL Cholangiogram Operative [[id number]] Collected: 09/07/23 1507     Updated: 09/07/23 1511    Narrative:      FL CHOLANGIOGRAM OPERATIVE- 9/7/2023 1:50 PM CDT     HISTORY: stones; N30.00-Acute cystitis without hematuria;  M62.82-Rhabdomyolysis; N17.9-Acute kidney failure, unspecified;  I50.9-Heart  failure, unspecified; R62.7-Adult failure to thrive;  W19.XXXA-Unspecified fall, initial encounter; R13.10-Dysphagia,  unspecified; K81.0-Acute cholecystitis; K81.9-Cholecystitis, unspecified     COMPARISON: None     FLUOROSCOPY TIME: 6 seconds     FLUOROSCOPY DOSE/ CUMULATIVE AIR KERMA: 1.1003 mGy     NUMBER OF IMAGES: 9       Impression:         Intraoperative fluoroscopic images during intraoperative cholangiogram.  Mild prominence of the common bile duct with no convincing fixed  intraluminal filling defects. Antegrade flow into the duodenum. No  contrast extravasation..     Please refer to the operative note for more details.  This report was finalized on 09/07/2023 15:08 by Dr. Blanka Pak MD.    FL C Arm During Surgery [[id number]] Resulted: 09/07/23 1408     Updated: 09/07/23 1408    Narrative:      This procedure was auto-finalized with no dictation required.    US Abdomen Limited [[id number]] Collected: 09/06/23 1113     Updated: 09/06/23 1120    Narrative:      US ABDOMEN LIMITED- 9/6/2023 9:24 AM CDT     REASON FOR EXAM: transaminitis; N30.00-Acute cystitis without hematuria;  M62.82-Rhabdomyolysis; N17.9-Acute kidney failure, unspecified;  I50.9-Heart failure, unspecified; R62.7-Adult failure to thrive;  W19.XXXA-Unspecified fall, initial encounter; R13.10-Dysphagia,  unspecified       COMPARISON: CT abdomen pelvis 9/4/2023.      TECHNIQUE: Multiple longitudinal and transverse realtime sonographic  images of the right upper quadrant of the abdomen are obtained.      FINDINGS:    Pancreas: Normal in size and echogenicity.      Liver: Normal in size, echogenicity and echotexture. No focal lesion.      Gallbladder: The gallbladder is mildly distended. No visualized stones.  Borderline gallbladder wall thickening measuring up to 3 mm. No  pericholecystic fluid..      Bile ducts: The CBD measures 0.7 cm in diameter. There is no  intrahepatic or extrahepatic ductal dilation.     Right kidney: Measures 9.2  cm in length. Normal in sonographic  appearance.     Other: No ascites.        Impression:         Mildly distended gallbladder with borderline gallbladder wall  thickening. Recommend correlation for early acute or chronic  cholecystitis. No visualized gallstones. No pericholecystic fluid. This  could be further evaluated with HIDA scan if clinically indicated.  This report was finalized on 09/06/2023 11:17 by  Sunny Degroot DO.    XR Shoulder 2+ View Left [[id number]] Collected: 09/04/23 1952     Updated: 09/04/23 1957    Narrative:      EXAM/TECHNIQUE: XR SHOULDER 2+ VW LEFT-     INDICATION: left shoulder pain     COMPARISON: None     FINDINGS:     Glenohumeral and acromioclavicular joints are maintained. No acute  fracture or dislocation. Mild AC joint osteophyte development. No  suspicious bone lesion. Included portion of the LEFT chest appears  unremarkable. No acute soft tissue finding.       Impression:         No acute osseous findings.  This report was finalized on 09/04/2023 19:54 by Dr. Bakari Ramos MD.    CT Thoracic Spine Without Contrast [[id number]] Collected: 09/04/23 1859     Updated: 09/04/23 1905    Narrative:      EXAM/TECHNIQUE: CT thoracic spine without contrast     INDICATION: Spine fracture, thoracic, traumatic     COMPARISON: None     DLP: 963 mGy cm. Automated exposure control was also utilized to  decrease patient radiation dose.     FINDINGS:     Mild thoracic spine levocurvature, potentially related to patient  positioning. Thoracic kyphosis is maintained. No subluxations. Vertebral  body heights are maintained. No acute fracture. Mild multilevel thoracic  spine degenerative change. Included portion of the posterior lungs are  clear. No pneumothorax. Paravertebral soft tissues are unremarkable.  Esophagus is mildly patulous.       Impression:         1.  No acute fracture or subluxation.  2.  Mild multilevel cervical spine degenerative change.  This report was finalized on 09/04/2023  19:01 by Dr. Bakari Ramos MD.    CT Lumbar Spine Without Contrast [[id number]] Collected: 09/04/23 1852     Updated: 09/04/23 1901    Narrative:      EXAM/TECHNIQUE:  1. CT abdomen pelvis without contrast  2. CT lumbar spine without contrast     INDICATION: Fall, abdominal pain, back pain     COMPARISON: 02/08/2022     DLP: 252 mGy cm. Automated exposure control was also utilized to  decrease patient radiation dose.     FINDINGS:     CT abdomen pelvis without contrast:     Mild atelectasis in the included lung bases. The unenhanced liver,  gallbladder, adrenal glands, and pancreas are unremarkable. Gallbladder  is distended without evidence of wall thickening or gallstones. No large  renal lesion. No urolithiasis or hydronephrosis. No focal urinary  bladder abnormality.     Large volume stool in the rectum and distal colon. Long segment wall  thickening of the sigmoid colon and distal descending colon with  surrounding fat stranding. A few scattered colonic diverticula are also  present within this region. The ascending and transverse colon appear  unremarkable. No secondary signs of appendicitis. No small bowel  distention or evidence of active small bowel inflammation.     No ascites or free pelvic fluid. No pelvic mass or pelvic collection.  Prior hysterectomy.     Normal caliber abdominal aorta with atherosclerotic calcification. No  enlarged abdominal or pelvic lymph nodes. No acute pelvic or hip  fracture. Sacrum appears intact. SI joints and pubic symphysis are  intact.     CT lumbar spine without contrast:     5 lumbar type vertebral bodies. Grade 1 anterolisthesis of L4 on L5,  likely degenerative in nature. Lumbar vertebral body heights are  maintained. No acute fracture. Moderate multilevel lumbar spine  degenerative change with multilevel central canal or neural foraminal  stenosis.       Impression:         1.  No acute traumatic finding in the abdomen or pelvis.     2.  No acute lumbar spine  fracture.     3.  Large volume stool in the rectum and distal colon. Wall thickening  and inflammation of the sigmoid colon and distal descending colon,  compatible with acute colitis. This may be related to stercoral colitis  although differential also includes infectious colitis and acute  diverticulitis given the presence of small diverticula.     4.  Multilevel lumbar spine degenerative change and grade 1  anterolisthesis of L4 on L5 which is likely degenerative.     5.  Distended gallbladder without evidence of acute cholecystitis.  This report was finalized on 09/04/2023 18:58 by Dr. Bakari Ramos MD.    CT Abdomen Pelvis Without Contrast [[id number]] Collected: 09/04/23 1852     Updated: 09/04/23 1901    Narrative:      EXAM/TECHNIQUE:  1. CT abdomen pelvis without contrast  2. CT lumbar spine without contrast     INDICATION: Fall, abdominal pain, back pain     COMPARISON: 02/08/2022     DLP: 252 mGy cm. Automated exposure control was also utilized to  decrease patient radiation dose.     FINDINGS:     CT abdomen pelvis without contrast:     Mild atelectasis in the included lung bases. The unenhanced liver,  gallbladder, adrenal glands, and pancreas are unremarkable. Gallbladder  is distended without evidence of wall thickening or gallstones. No large  renal lesion. No urolithiasis or hydronephrosis. No focal urinary  bladder abnormality.     Large volume stool in the rectum and distal colon. Long segment wall  thickening of the sigmoid colon and distal descending colon with  surrounding fat stranding. A few scattered colonic diverticula are also  present within this region. The ascending and transverse colon appear  unremarkable. No secondary signs of appendicitis. No small bowel  distention or evidence of active small bowel inflammation.     No ascites or free pelvic fluid. No pelvic mass or pelvic collection.  Prior hysterectomy.     Normal caliber abdominal aorta with atherosclerotic calcification.  No  enlarged abdominal or pelvic lymph nodes. No acute pelvic or hip  fracture. Sacrum appears intact. SI joints and pubic symphysis are  intact.     CT lumbar spine without contrast:     5 lumbar type vertebral bodies. Grade 1 anterolisthesis of L4 on L5,  likely degenerative in nature. Lumbar vertebral body heights are  maintained. No acute fracture. Moderate multilevel lumbar spine  degenerative change with multilevel central canal or neural foraminal  stenosis.       Impression:         1.  No acute traumatic finding in the abdomen or pelvis.     2.  No acute lumbar spine fracture.     3.  Large volume stool in the rectum and distal colon. Wall thickening  and inflammation of the sigmoid colon and distal descending colon,  compatible with acute colitis. This may be related to stercoral colitis  although differential also includes infectious colitis and acute  diverticulitis given the presence of small diverticula.     4.  Multilevel lumbar spine degenerative change and grade 1  anterolisthesis of L4 on L5 which is likely degenerative.     5.  Distended gallbladder without evidence of acute cholecystitis.  This report was finalized on 09/04/2023 18:58 by Dr. Bakari Ramos MD.    CT Cervical Spine Without Contrast [[id number]] Collected: 09/04/23 1844     Updated: 09/04/23 1852    Narrative:      EXAM/TECHNIQUE: CT cervical spine without contrast     INDICATION: Neck trauma (Age >= 65y)     COMPARISON: None     DLP: 367 mGy cm. Automated exposure control was also utilized to  decrease patient radiation dose.     FINDINGS:     Craniocervical relationships are maintained. No evidence of acute  odontoid process fracture. Corticated ossific fragment along the tip of  the odontoid may be related to old injury. Trace anterolisthesis of C4  on C5. Cervical spine alignment is otherwise maintained. Vertebral body  heights are maintained. No acute fracture. Facet joints are anatomically  aligned. Moderate multilevel  cervical spine degenerative change with  multilevel neural foraminal stenosis, most pronounced at C5-C6.  Paravertebral soft tissues are unremarkable. Carotid artery  atherosclerotic calcifications are noted.       Impression:         1.  No acute osseous findings.  2.  Moderate multilevel cervical spine degenerative change, greatest at  C5-C6.  3.  Trace anterolisthesis of C4 on C5, likely degenerative in nature.  This report was finalized on 09/04/2023 18:49 by Dr. Bakari Ramos MD.    CT Head Without Contrast [[id number]] Collected: 09/04/23 1844     Updated: 09/04/23 1847    Narrative:      EXAM/TECHNIQUE: CT head without contrast     INDICATION: Head trauma, moderate-severe     COMPARISON: 02/08/2022     DLP: 697 mGy cm. Automated exposure control was also utilized to  decrease patient radiation dose.     FINDINGS:     No evidence of intracranial hemorrhage. Gray-white differentiation is  maintained. Chronic microvascular ischemic white matter change and  global cerebral volume loss. Mild ex vacuo ventricular dilatation. No  midline shift or mass effect. Basilar cisterns are patent. No acute  orbital finding. Mastoid air cells are clear. Chronic RIGHT maxillary  sinus mucosal thickening and mucosal retention cyst. Motion artifact  limits evaluation of the skull base. No acute osseous finding.       Impression:         1.  No acute intracranial findings.  2.  Global cerebral volume loss and presumed chronic microvascular  ischemic white matter change.  This report was finalized on 09/04/2023 18:44 by Dr. Bakari Ramos MD.    XR Chest 1 View [[id number]] Collected: 09/04/23 1705     Updated: 09/04/23 1710    Narrative:      EXAM/TECHNIQUE: XR CHEST 1 VW-     INDICATION: Altered mental status     COMPARISON: 03/14/2023     FINDINGS:     Cardiac silhouette is normal in size. No pleural effusion, pneumothorax,  or focal consolidation. No acute osseous finding.       Impression:         No acute findings.  This  report was finalized on 09/04/2023 17:07 by Dr. Bakari Ramos MD.          LAB RESULTS:      Lab 09/09/23  0420 09/08/23  0429 09/07/23  0506 09/06/23  1307 09/06/23  0353 09/05/23  0539 09/04/23  2024 09/04/23  1657   WBC 10.65 8.24 8.39  --  10.40 13.50*  --  20.62*   HEMOGLOBIN 9.2* 7.7* 8.7*  --  7.4* 8.8*  --  9.6*   HEMATOCRIT 30.2* 24.9* 30.5*  --  24.2* 28.0*  --  29.5*   PLATELETS 197 154 148  --  151 150  --  182   NEUTROS ABS  --   --  7.60*  --  9.54* 12.42*  --  17.73*   MCV 97.1* 96.1 105.9*  --  98.8* 96.6  --  94.9   LACTATE  --   --  1.3  --   --   --  1.5 2.1*   PROTIME  --   --  16.1* 16.9*  --   --   --   --          Lab 09/10/23  0628 09/09/23  0420 09/08/23  0429 09/07/23  0506 09/06/23  1307 09/06/23  0353 09/05/23  0539 09/04/23  1857 09/04/23  1657   SODIUM 142 142 140 140 142   < > 140  --  138   POTASSIUM 3.8 3.1* 3.5 3.4* 3.4*   < > 2.9*  --  3.1*   CHLORIDE 111* 113* 113* 112* 113*   < > 110*  --  105   CO2 21.0* 19.0* 19.0* 16.0* 19.0*   < > 17.0*  --  17.0*   ANION GAP 10.0 10.0 8.0 12.0 10.0   < > 13.0  --  16.0*   BUN 11 14 18 25* 31*   < > 42*  --  51*   CREATININE 0.67 0.60 0.72 0.73 0.82   < > 1.10*  --  1.66*   EGFR 88.5 90.9 84.6 83.3 72.4   < > 50.9*  --  31.1*   GLUCOSE 118* 107* 154* 120* 143*   < > 116*  --  114*   CALCIUM 8.9 8.9 8.5* 9.1 8.7   < > 8.9  --  9.7   MAGNESIUM 2.4 1.6  --   --   --   --   --  2.2 2.4   PHOSPHORUS  --   --   --   --   --   --  3.1  --   --     < > = values in this interval not displayed.         Lab 09/10/23  0628 09/09/23  0420 09/08/23  0429 09/07/23  0506 09/06/23  1307 09/06/23  0353 09/04/23  1657   TOTAL PROTEIN 5.8* 5.4* 5.4* 5.6* 5.8*   < > 6.1   ALBUMIN 2.6* 2.4* 2.3* 2.4* 2.5*   < > 3.1*   GLOBULIN 3.2 3.0 3.1 3.2 3.3   < > 3.0   ALT (SGPT) 120* 180* 267* 434* 501*   < > 28   AST (SGOT) 41* 82* 213* 531* 833*   < > 43*   BILIRUBIN 0.4 0.2 0.3 0.6 1.3*   < > 0.4   ALK PHOS 231* 240* 290* 398* 413*   < > 91   LIPASE  --   --   --   --    --   --  7*    < > = values in this interval not displayed.         Lab 09/07/23  0506 09/06/23  1307 09/04/23  1857 09/04/23  1657   PROBNP  --   --   --  2,431.0*   HSTROP T  --   --  21* 23*   PROTIME 16.1* 16.9*  --   --    INR 1.27* 1.36*  --   --              Lab 09/06/23  0353   VITAMIN B 12 >2,000*         Brief Urine Lab Results  (Last result in the past 365 days)        Color   Clarity   Blood   Leuk Est   Nitrite   Protein   CREAT   Urine HCG        09/04/23 1725 Dark Yellow   Turbid   Large (3+)   Large (3+)   Positive   100 mg/dL (2+)                 Microbiology Results (last 10 days)       Procedure Component Value - Date/Time    Blood Culture - Blood, Hand, Left [[id number]]  (Normal) Collected: 09/04/23 1743    Lab Status: Final result Specimen: Blood from Hand, Left Updated: 09/09/23 1801     Blood Culture No growth at 5 days    Urine Culture - Urine, Straight Cath [[id number]]  (Abnormal)  (Susceptibility) Collected: 09/04/23 1725    Lab Status: Final result Specimen: Urine from Straight Cath Updated: 09/07/23 1021     Urine Culture >100,000 CFU/mL Escherichia coli    Narrative:      Colonization of the urinary tract without infection is common. Treatment is discouraged unless the patient is symptomatic, pregnant, or undergoing an invasive urologic procedure.      Susceptibility        Escherichia coli      CHULA      Ampicillin Resistant      Ampicillin + Sulbactam Resistant      Cefazolin Susceptible      Cefepime Susceptible      Ceftazidime Susceptible      Ceftriaxone Susceptible      Gentamicin Susceptible      Levofloxacin Susceptible      Nitrofurantoin Susceptible      Piperacillin + Tazobactam Susceptible      Trimethoprim + Sulfamethoxazole Susceptible                           Blood Culture - Blood, Arm, Left [[id number]]  (Normal) Collected: 09/04/23 1704    Lab Status: Final result Specimen: Blood from Arm, Left Updated: 09/09/23 1745     Blood Culture No growth at 5 days       "    Hospital Course: Ms. Gomes presented to [facility] emergency room 9/4/2023 with back pain. EMS had been contacted after patient found down at home. Patient was covered in stool and urine. Patient reported she had been on the ground for \"a couple of days\". She lives with her  and daughter and it is unknown how long patient had been on the floor. WBC 20.6, potassium 3.1, creatinine 1.66, CK6 176, lactate 2.1, lipase 7, urinalysis too numerous to count WBC, 4+ bacteria, positive nitrate. Chest x-ray negative, CT abdomen pelvis shows large volume stool in rectum and distal colon with differential of acute colitis versus stercoral colitis. CT lumbar spine negative for fracture CT T-spine negative for fracture CT head negative for acute process, CT C-spine negative for fracture, x-ray left shoulder negative for fracture. Lactated Ringer's fluid bolus, Rocephin given in ER.     She was admitted to the medical floor with acute UTI, acute kidney injury, nontraumatic rhabdomyolysis.  Urinalysis 13-20 WBC, 4+ bacteria on admission.  Rocephin started on admission.  Urine culture returned positive for E. coli sensitive to Rocephin.  Patient completed treatment with Rocephin during hospitalization.  Blood cultures remain no growth at 5 days.    Acute kidney injury on admission with creatinine 1.66.  Baseline creatinine 0.81 on 3/14/2023.  Patient was hydrated with IV fluids and creatinine improved. Creatinine returned to normal on 9/6/2023 and has remained stable throughout remainder of hospitalization.  Creatinine below baseline at 0.67 at discharge.    Nontraumatic rhabdomyolysis.  CK6 176, 528, 150, 49.    Acute cholecystitis with transaminitis.  AST and ALT normal on admission.  ,  on 9/6/2023.  Bilirubin 1.3 on 9/6.  Hepatitis panel negative.  Ultrasound gallbladder 9/6/2023 reported mildly distended gallbladder with borderline gallbladder wall thickening.  Recommend correlation for early " acute on chronic cholecystitis.  No gallstones noted.  ,  bilirubin 0.6 on 9/7.  General surgery consulted and Dr. Arciniega took her for laparoscopic cholecystectomy and intraoperative cholangiogram on 9/7.  No drains postoperative.  Discussed with Dr. Arciniega and diet advanced as tolerated.  LFTs continue to improve with AST 41 and  at discharge.  Bilirubin normal 0.4.    CT scan of the abdomen pelvis noted large volume stool in the rectum and distal colon with differential of acute colitis for stercoral colitis Flagyl started 9/5 and continued.  Bowel regimen continued.  Leukocytosis resolved.  Patient disimpacted at the time of surgery.  Patient had multiple bowel movements postoperatively.  Flagyl transitioned to oral at discharge.  Discussed with daughter Lola per telephone conversation on 9/9/2023 to maintain bowel regimen to avoid constipation as patient does take narcotics for chronic pain syndrome.  Encouraged to continue MiraLAX daily after discharge.    She has a history of normocytic anemia.  Hemoglobin 9.6 on admission trended down to 7.4 suspected dilutional effect from IV fluids.  No evidence of bright red bleeding or dark tarry stools noted.  Hemoglobin 7.7 on 9/8 postoperatively.  Hemoglobin improved to 9.2 on 9/9.    Patient has chronic pain syndrome with chronic prescription for opioids.  OxyContin home medication continued.    Coreg continued for primary hypertension.  Blood pressure 134/70.    Hypokalemia with potassium 2.9 on 9/5.  Potassium replaced.  Potassium 3.1 on 9/9 and patient received 40 mEq potassium x2 doses.  Follow-up potassium 3.8 on 9/10.  Magnesium level 1.6 on 9/9 and magnesium 2 g IV given.  Follow-up magnesium level 2.4 at discharge.    Patient has history of spinal stenosis, lumbar region without neurogenic claudication.  Physical therapy consulted and patient sat on side of the bed and took a few steps in the room.  Patient needed assistance with  "personal hygiene and toileting due to bowel movement.  Patient indicates she has a walker at home.  Discussed skilled nursing facility with both patient and daughter Lola and both patient and daughter declined.  Patient and daughter, Lola are agreeable to home health with physical therapy at discharge.  According to daughter, Lola patient only takes a very limited amount of steps using a walker in the home.  Discussed with patient and daughter that patient is at increased risk for fall but again, both declined skilled nursing facility.    Prevacid continued for GERD and Synthroid continued for hypothyroidism.  Lovenox and SCDs ordered for deep vein thrombosis prophylaxis.    On 9/10/2023, patient is stable for discharge.  Discussed skilled nursing facility with patient and daughter Lola and both declined.  However, patient and daughter are agreeable to home health with physical therapy and Occupational Therapy at discharge.  Patient will follow-up with Dr. Arciniega on 9/26/2023.  Follow-up with MORGAN Claros in 1 week.    Physical Exam on Discharge:  /70 (BP Location: Left arm, Patient Position: Sitting)   Pulse 70   Temp 97.8 °F (36.6 °C) (Oral)   Resp 16   Ht 157.5 cm (62.01\")   Wt 75.5 kg (166 lb 7.2 oz)   LMP  (LMP Unknown)   SpO2 98%   BMI 30.44 kg/m²   Physical Exam  Vitals and nursing note reviewed.   Constitutional:       Comments: Patient sitting up in chair.  No oxygen use.  No visitors in room.   HENT:      Head: Normocephalic and atraumatic.      Nose: No congestion.      Mouth/Throat:      Pharynx: Oropharynx is clear. No oropharyngeal exudate or posterior oropharyngeal erythema.   Eyes:      Extraocular Movements: Extraocular movements intact.      Pupils: Pupils are equal, round, and reactive to light.   Cardiovascular:      Rate and Rhythm: Normal rate and regular rhythm.      Heart sounds: No murmur heard.     Comments: Normal sinus rhythm 71 on telemetry.  Pulmonary:      " Breath sounds: No wheezing, rhonchi or rales.      Comments: No oxygen in use.  Abdominal:      General: There is no distension.      Palpations: Abdomen is soft.      Comments: Surgical port site dressings clean and dry   Genitourinary:     Comments: Voiding.  Musculoskeletal:         General: No swelling or tenderness.      Cervical back: Normal range of motion and neck supple.   Skin:     General: Skin is warm and dry.   Neurological:      General: No focal deficit present.      Mental Status: She is alert and oriented to person, place, and time.   Psychiatric:         Mood and Affect: Mood normal.         Behavior: Behavior normal.     Condition on Discharge: Stable for discharge home with home health and physical therapy.  Patient and daughter Lola declined SNF.    Dis[place] Dis[place] [facility] [place]charge Medications:     Discharge Medications        New Medications        Instructions Start Date   metroNIDAZOLE 500 MG tablet  Commonly known as: FLAGYL   500 mg, Oral, Every 8 Hours Scheduled             Continue These Medications        Instructions Start Date   aspirin 81 MG chewable tablet   81 mg, Oral, Daily      atorvastatin 40 MG tablet  Commonly known as: LIPITOR   1 tablet, Oral, Daily      butalbital-acetaminophen-caffeine -40 MG per tablet  Commonly known as: FIORICET, ESGIC   1 tablet, Oral, 2 Times Daily      carvedilol 12.5 MG tablet  Commonly known as: COREG   12.5 mg, Oral, 2 Times Daily With Meals      cyclobenzaprine 10 MG tablet  Commonly known as: FLEXERIL   10 mg, Oral, 3 Times Daily PRN      donepezil 10 MG tablet  Commonly known as: ARICEPT   10 mg, Oral, Nightly      ergocalciferol 1.25 MG (70992 UT) capsule  Commonly known as: ERGOCALCIFEROL   50,000 Units, Oral, Weekly, Saturday      ipratropium 0.06 % nasal spray  Commonly known as: ATROVENT   2 sprays, Nasal, 2 Times Daily      LACTASE ENZYME PO   3 tablets, Oral, As Needed      lansoprazole 30 MG  capsule  Commonly known as: PREVACID   30 mg, Oral, Every Morning      levothyroxine 50 MCG tablet  Commonly known as: SYNTHROID, LEVOTHROID   50 mcg, Oral, Every Morning      MiraLax 17 g packet  Generic drug: polyethylene glycol   17 g, Oral, Daily PRN      nystatin 635663 UNIT/GM cream  Commonly known as: MYCOSTATIN   1 application , Topical, 2 Times Daily      oxyCODONE 15 MG immediate release tablet  Commonly known as: ROXICODONE   15 mg, Oral, 4 Times Daily      sertraline 25 MG tablet  Commonly known as: ZOLOFT   25 mg, Oral, Daily      solifenacin 10 MG tablet  Commonly known as: VESICARE   1 tablet, Oral, Daily      SUMAtriptan 50 MG tablet  Commonly known as: IMITREX   50 mg, Oral, 2 Times Daily PRN      venlafaxine 25 MG tablet  Commonly known as: EFFEXOR   25 mg, Oral, Daily             Discharge Diet:   Diet Instructions       Diet: Regular/House Diet; Regular Texture (IDDSI 7); Thin (IDDSI 0)      Discharge Diet: Regular/House Diet    Texture: Regular Texture (IDDSI 7)    Fluid Consistency: Thin (IDDSI 0)          Activity at Discharge:   Activity Instructions       Activity as Tolerated            Discharge instructions:  1.  For recurrent fall seek medical attention.  2.  Complete Flagyl after discharge.  3.  Follow-up with MORGAN Claros in 1 week  4.  Home health with physical therapy at discharge  5.  Follow-up with Dr. Arciniega 9/26/2023    Follow-up Appointments:   Future Appointments   Date Time Provider Department Center   9/26/2023  2:00 PM Gerardo Arciniega MD [facility]   MORGAN Claros in 1 week      Test Results Pending at Discharge: None    Electronically signed by MORGAN Ramirez, 09/10/23, 07:55 CDT.    Time: 35 minutes.  Discussed with Dr. Kent, and patient.  Discussed with daughter Lola per telephone conversation on 9/9/2023.    The above documentation resulted from a face-to-face encounter by me Steff MORA, [facility].           Electronically signed by  Dheeraj Kent MD at 09/10/23 1557       Discharge Order (From admission, onward)       Start     Ordered    09/10/23 0750  Discharge patient  Once        Expected Discharge Date: 09/10/23   [place] [facility] [place]   Physician of Record for Attribution - Please select from Treatment Team: YVETTE GRULLON [[id number]]   Review needed by CMO to determine Physician of Record: No      Question Answer Comment   Physician of Record for Attribution - Please select from Treatment Team YVETTE GRULLON    Review needed by CMO to determine Physician of Record No        09/10/23 0755

## 2023-09-11 NOTE — TELEPHONE ENCOUNTER
Sw pt she states she has a massive case of diarrhea would like something called into to pharmacy please advise .

## 2023-09-12 ENCOUNTER — TELEPHONE (OUTPATIENT)
Dept: INTERNAL MEDICINE CLINIC | Age: 83
End: 2023-09-12

## 2023-09-12 NOTE — TELEPHONE ENCOUNTER
[address] [facility] nurse admitted pt and plans to see pt 1x per week for 3 weeks. Per [facility] nurse :   Pt is having diarrhea with pain right before BM. Pt states she was told to take Flagyl as often as she needed to help with diarrhea. She then said it was Immodium that she was to take as much as needed and Flagyl was to be taken until it was all gone. When pain hit in abdomen, pt double over and was moaning as she went to the bathroom. Pt states that pain did not ease after BM. Also interaction possibilities noted with :    Butalbital and Carvedilol; Butalbital and Oxycodone; Butalbital and Sertraline; Butalbital and Venlafaxine; Carvedilol and Donepezil; Carvedilol and Ibuprofen; Cyclobenzaprine and Oxycodone; Cyclobenzaprine and Sertraline; Cyclobenzaprine and Venlafaxine; Ibuprofen and Sertraline;  Ibuprofen and Venlafaxine; and Levothyroxine and Sertraline.  - just need the ok to continue those  - no issues noted but they did flag as potential so they have to send to you

## 2023-09-13 ENCOUNTER — TELEPHONE (OUTPATIENT)
Dept: INTERNAL MEDICINE | Age: 83
End: 2023-09-13

## 2023-09-13 ENCOUNTER — TELEPHONE (OUTPATIENT)
Dept: INTERNAL MEDICINE CLINIC | Age: 83
End: 2023-09-13

## 2023-09-13 ENCOUNTER — READMISSION MANAGEMENT (OUTPATIENT)
Dept: CALL CENTER | Facility: HOSPITAL | Age: 83
End: 2023-09-13
Payer: MEDICARE

## 2023-09-13 NOTE — TELEPHONE ENCOUNTER
[address] [facility] OT archie completed ad per OT Pt with decreased overall strength/endurance/activity with associated decrease in safety/independence with adl and functional mobility routines.   Recommendation :  OT 2x/week x 2 weeks beginning 9.17.2023    Please advise if approved

## 2023-09-13 NOTE — TELEPHONE ENCOUNTER
Aditi sent me a message that she is going to to the stool swab at the [facility] tomorrow

## 2023-09-13 NOTE — TELEPHONE ENCOUNTER
TCM note under call from 9/11/23 attached to another phone note. Spoke to the pt today she did confirm her apt for tomorrow.

## 2023-09-13 NOTE — OUTREACH NOTE
General Surgery Week 1 Survey      Flowsheet Row Responses   [facility] patient discharged from? [place]   Does the patient have one of the following disease processes/diagnoses(primary or secondary)? General Surgery   Week 1 attempt successful? Yes   Call start time 1346   Call end time 1350   List who call center can speak with pt   Meds reviewed with patient/caregiver? Yes   Is the patient having any side effects they believe may be caused by any medication additions or changes? No   Does the patient have all medications related to this admission filled (includes all antibiotics, pain medications, etc.) Yes   Is the patient taking all medications as directed (includes completed medication regime)? Yes   Does the patient have a follow up appointment scheduled with their surgeon? Yes   Has the patient kept scheduled appointments due by today? N/A   Has home health visited the patient within 72 hours of discharge? Yes   Psychosocial issues? No   Did the patient receive a copy of their discharge instructions? Yes   Nursing interventions Reviewed instructions with patient   What is the patient's perception of their health status since discharge? Improving   Nursing interventions Nurse provided patient education   Is the patient /caregiver able to teach back basic post-op care? Drive as instructed by MD in discharge instructions, Take showers only when approved by MD-sponge bathe until then, No tub bath, swimming, or hot tub until instructed by MD, Keep incision areas clean,dry and protected, Do not remove steri-strips, Lifting as instructed by MD in discharge instructions   Is the patient/caregiver able to teach back signs and symptoms of incisional infection? Increased redness, swelling or pain at the incisonal site, Increased drainage or bleeding, Incisional warmth, Pus or odor from incision, Fever   Is the patient/caregiver able to teach back steps to recovery at home? Set small, achievable goals for return  to baseline health, Rest and rebuild strength, gradually increase activity   Is the patient/caregiver able to teach back the hierarchy of who to call/visit for symptoms/problems? PCP, Specialist, Home health nurse, Urgent Care, ED, 911 Yes   Week 1 call completed? Yes   Wrap up additional comments Pt reports improving slowly.Pt's main complaint is diarrhea. Pt to see pcp tomorrow.   Call end time 1350            Yasemin CRAIG - Registered Nurse

## 2023-09-14 ENCOUNTER — OFFICE VISIT (OUTPATIENT)
Dept: INTERNAL MEDICINE | Age: 83
End: 2023-09-14

## 2023-09-14 VITALS — SYSTOLIC BLOOD PRESSURE: 118 MMHG | DIASTOLIC BLOOD PRESSURE: 60 MMHG | HEART RATE: 80 BPM | OXYGEN SATURATION: 98 %
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
## 2023-09-14 DIAGNOSIS — R79.89 ELEVATED LFTS: ICD-10-CM

## 2023-09-14 DIAGNOSIS — I10 PRIMARY HYPERTENSION: ICD-10-CM

## 2023-09-14 DIAGNOSIS — N17.0 ACUTE KIDNEY INJURY (AKI) WITH ACUTE TUBULAR NECROSIS (ATN) (HCC): ICD-10-CM

## 2023-09-14 DIAGNOSIS — K81.0 ACUTE CHOLECYSTITIS: ICD-10-CM

## 2023-09-14 DIAGNOSIS — Z09 HOSPITAL DISCHARGE FOLLOW-UP: Primary | ICD-10-CM
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
## 2023-09-14 RX ORDER — MONTELUKAST SODIUM 4 MG/1
1 TABLET, CHEWABLE ORAL 2 TIMES DAILY
Qty: 60 TABLET | Refills: 3 | Status: SHIPPED | OUTPATIENT
Start: 2023-09-14

## 2023-09-14 RX ORDER — METRONIDAZOLE 500 MG/1
TABLET ORAL
COMMUNITY
Start: 2023-09-11

## 2023-09-14 NOTE — PATIENT INSTRUCTIONS
Hypertension;  lets cut the carvedilol to 1/2 tiwce daily for a while;  we will get HH to follow along; and when you feel stronger and pressure is more normalized you can go back to the whole pills  UTI  HH to recheck next week   Elevated LFT's   recheck next week   Cholecystectomy; s/p  with diarrhea;   so now if you feel constipated, get that cleared and then use the colestid twice daily for loose stools  GUSTAVO;  resolved;  recheck next week

## 2023-09-14 NOTE — ASSESSMENT & PLAN NOTE
She is stable with carvedilol 12.5 twice daily we will get a hold that to a half twice a day until she is feeling better

## 2023-09-14 NOTE — PROGRESS NOTES
Post-Discharge Transitional Care Management Progress Note      Joo Kim   YOB: 1940    Date of Office Visit:  9/14/2023  Date of Hospital Admission: 9/04/2023  Date of Hospital Discharge: 9/10/2023    Care management risk score Rising risk (score 2-5) and Complex Care (Scores >=6): No Risk Score On File     Non face to face  following discharge, date last encounter closed (first attempt may have been earlier): 09/11/2023 09/11/2023    Call initiated 2 business days of discharge: Yes    ASSESSMENT/PLAN:   Hospital discharge follow-up  -     SD DISCHARGE MEDS RECONCILED W/ CURRENT OUTPATIENT MED LIST  Acute cholecystitis  Assessment & Plan:  S/p cholecystectomy; Fall, sequela  Assessment & Plan:  She got weak and her  helped her to the floor  but she was there abotu 3 hours; Elevated LFTs  Assessment & Plan: It  this was with her acute cholecystitis it has trended down to almost normal we will recheck next week  Primary hypertension  Assessment & Plan:  She is stable with carvedilol 12.5 twice daily we will get a hold that to a half twice a day until she is feeling better   Acute kidney injury (GUSTAVO) with acute tubular necrosis (ATN) ([address])  Assessment & Plan:  She has a normal GFR on admission her GFR was 30 it normalized prior to discharge       Medical Decision Making: moderate complexity  No follow-ups on file. On this date 9/14/2023 I have spent 42 minutes reviewing previous notes, test results and face to face with the patient discussing the diagnosis and importance of compliance with the treatment plan as well as documenting on the day of the visit. Subjective:   HPI:  Follow up of Hospital problems/diagnosis(es):   she is not alone; her  is there with her all the time;      Cholecystectomy;  9/7/2023  DR Melinda Aburto acute chololithiasis  Isabell Gain in the floor and was there for 2 days;   but her daughter says she was not there that long;  thn she corrected about 3 hours;

## 2023-09-14 NOTE — ASSESSMENT & PLAN NOTE
It  this was with her acute cholecystitis it has trended down to almost normal we will recheck next week

## 2023-09-18 ENCOUNTER — TELEPHONE (OUTPATIENT)
Dept: INTERNAL MEDICINE CLINIC | Age: 83
End: 2023-09-18

## 2023-09-18 LAB
BACTERIA URNS QL MICRO: NEGATIVE /HPF
BILIRUB UR QL STRIP: NEGATIVE
CLARITY UR: ABNORMAL
COLOR UR: YELLOW
CRYSTALS URNS MICRO: ABNORMAL /HPF
EPI CELLS #/AREA URNS AUTO: 5 /HPF (ref 0–5)
GLUCOSE UR STRIP.AUTO-MCNC: NEGATIVE MG/DL
HGB UR STRIP.AUTO-MCNC: ABNORMAL MG/L
HYALINE CASTS #/AREA URNS AUTO: 29 /HPF (ref 0–8)
KETONES UR STRIP.AUTO-MCNC: ABNORMAL MG/DL
LEUKOCYTE ESTERASE UR QL STRIP.AUTO: ABNORMAL
NITRITE UR QL STRIP.AUTO: NEGATIVE
PH UR STRIP.AUTO: 6 [PH] (ref 5–8)
PROT UR STRIP.AUTO-MCNC: 30 MG/DL
RBC #/AREA URNS AUTO: 23 /HPF (ref 0–4)
SP GR UR STRIP.AUTO: 1.03 (ref 1–1.03)
UROBILINOGEN UR STRIP.AUTO-MCNC: 0.2 E.U./DL
WBC #/AREA URNS AUTO: 44 /HPF (ref 0–5)

## 2023-09-18 NOTE — TELEPHONE ENCOUNTER
HH was notified Patient Education        Strep Throat: Care Instructions  Your Care Instructions    Strep throat is a bacterial infection that causes sudden, severe sore throat and fever. Strep throat, which is caused by bacteria called streptococcus, is treated with antibiotics. Sometimes a strep test is necessary to tell if the sore throat is caused by strep bacteria. Treatment can help ease symptoms and may prevent future problems. Follow-up care is a key part of your treatment and safety. Be sure to make and go to all appointments, and call your doctor if you are having problems. It's also a good idea to know your test results and keep a list of the medicines you take. How can you care for yourself at home? · Take your antibiotics as directed. Do not stop taking them just because you feel better. You need to take the full course of antibiotics. · Strep throat can spread to others until 24 hours after you begin taking antibiotics. During this time, you should avoid contact with other people at work or home, especially infants and children. Do not sneeze or cough on others, and wash your hands often. Keep your drinking glass and eating utensils separate from those of others, and wash these items well in hot, soapy water. · Gargle with warm salt water at least once each hour to help reduce swelling and make your throat feel better. Use 1 teaspoon of salt mixed in 8 fluid ounces of warm water. · Take an over-the-counter pain medication, such as acetaminophen (Tylenol), ibuprofen (Advil, Motrin), or naproxen (Aleve). Read and follow all instructions on the label. · Try an over-the-counter anesthetic throat spray or throat lozenges, which may help relieve throat pain. · Drink plenty of fluids. Fluids may help soothe an irritated throat. Hot fluids, such as tea or soup, may help your throat feel better. · Eat soft solids and drink plenty of clear liquids.  Flavored ice pops, ice cream, scrambled eggs, sherbet, and gelatin dessert (such as Jell-O) may also soothe the throat. · Get lots of rest.  · Do not smoke, and avoid secondhand smoke. If you need help quitting, talk to your doctor about stop-smoking programs and medicines. These can increase your chances of quitting for good. · Use a vaporizer or humidifier to add moisture to the air in your bedroom. Follow the directions for cleaning the machine. When should you call for help? Call your doctor now or seek immediate medical care if:    · You have a new or higher fever.     · You have a fever with a stiff neck or severe headache.     · You have new or worse trouble swallowing.     · Your sore throat gets much worse on one side.     · Your pain becomes much worse on one side of your throat.    Watch closely for changes in your health, and be sure to contact your doctor if:    · You are not getting better after 2 days (48 hours).     · You do not get better as expected. Where can you learn more? Go to http://deysi-skyler.info/. Enter K625 in the search box to learn more about \"Strep Throat: Care Instructions. \"  Current as of: March 27, 2018  Content Version: 11.9  © 1224-3917 [employer], [facility]. Care instructions adapted under license by [employer] (which disclaims liability or warranty for this information). If you have questions about a medical condition or this instruction, always ask your healthcare professional. [place] [address] any warranty or liability for your use of this information.

## 2023-09-18 NOTE — TELEPHONE ENCOUNTER
[address] [facility] nurse reporting that she was unable to obtain pts labs today ( CBC, CMP , PREALBUMIN)  it is ok for them to try to obtain that again tomorrow  ? Or do you want them to go back tonight  ? She did get the Urine and took that to [address] Tenisha also reports \"I have diarrhea constantly and have to wear depends. \"    [address] [facility],[address] nurse instructed her to drink plenty of water

## 2023-09-19 LAB
ALBUMIN SERPL-MCNC: 3 G/DL (ref 3.5–5.2)
ALP SERPL-CCNC: 97 U/L (ref 35–104)
ALT SERPL-CCNC: 18 U/L (ref 5–33)
ANION GAP SERPL CALCULATED.3IONS-SCNC: 10 MMOL/L (ref 7–19)
AST SERPL-CCNC: 17 U/L (ref 5–32)
BASOPHILS # BLD: 0.1 K/UL (ref 0–0.2)
BASOPHILS NFR BLD: 0.9 % (ref 0–1)
BILIRUB SERPL-MCNC: <0.2 MG/DL (ref 0.2–1.2)
BUN SERPL-MCNC: 27 MG/DL (ref 8–23)
CALCIUM SERPL-MCNC: 8.9 MG/DL (ref 8.8–10.2)
CHLORIDE SERPL-SCNC: 105 MMOL/L (ref 98–111)
CO2 SERPL-SCNC: 25 MMOL/L (ref 22–29)
CREAT SERPL-MCNC: 0.8 MG/DL (ref 0.5–0.9)
EOSINOPHIL # BLD: 0.2 K/UL (ref 0–0.6)
EOSINOPHIL NFR BLD: 2.2 % (ref 0–5)
ERYTHROCYTE [DISTWIDTH] IN BLOOD BY AUTOMATED COUNT: 15.5 % (ref 11.5–14.5)
GLUCOSE SERPL-MCNC: 120 MG/DL (ref 74–109)
HCT VFR BLD AUTO: 31.7 % (ref 37–47)
HGB BLD-MCNC: 10.1 G/DL (ref 12–16)
IMM GRANULOCYTES # BLD: 0 K/UL
LYMPHOCYTES # BLD: 1.1 K/UL (ref 1.1–4.5)
LYMPHOCYTES NFR BLD: 13.6 % (ref 20–40)
MCH RBC QN AUTO: 31.2 PG (ref 27–31)
MCHC RBC AUTO-ENTMCNC: 31.9 G/DL (ref 33–37)
MCV RBC AUTO: 97.8 FL (ref 81–99)
MONOCYTES # BLD: 0.5 K/UL (ref 0–0.9)
MONOCYTES NFR BLD: 6.9 % (ref 0–10)
NEUTROPHILS # BLD: 5.9 K/UL (ref 1.5–7.5)
NEUTS SEG NFR BLD: 75.9 % (ref 50–65)
PLATELET # BLD AUTO: 328 K/UL (ref 130–400)
PMV BLD AUTO: 11.3 FL (ref 9.4–12.3)
POTASSIUM SERPL-SCNC: 3.4 MMOL/L (ref 3.5–5)
PREALB SERPL-MCNC: 18 MG/DL (ref 20–40)
PROT SERPL-MCNC: 5.9 G/DL (ref 6.6–8.7)
RBC # BLD AUTO: 3.24 M/UL (ref 4.2–5.4)
SODIUM SERPL-SCNC: 140 MMOL/L (ref 136–145)
WBC # BLD AUTO: 7.8 K/UL (ref 4.8–10.8)

## 2023-09-20 LAB — BACTERIA UR CULT: NORMAL

## 2023-09-21 ENCOUNTER — READMISSION MANAGEMENT (OUTPATIENT)
Dept: CALL CENTER | Facility: HOSPITAL | Age: 83
End: 2023-09-21
Payer: MEDICARE

## 2023-09-21 NOTE — OUTREACH NOTE
General Surgery Week 2 Survey      Flowsheet Row Responses   [facility] patient discharged from? [place]   Does the patient have one of the following disease processes/diagnoses(primary or secondary)? General Surgery   Week 2 attempt successful? Yes   Call start time 1604   Call end time 1613   Discharge diagnosis **E. coli UTI   Acute cholecystitisLaparoscopic cholecystectomy and intraoperative cholangiogram 9/7/2023 Dr. Suze Ford reviewed with patient/caregiver? Yes   Is the patient having any side effects they believe may be caused by any medication additions or changes? No   Does the patient have all medications related to this admission filled (includes all antibiotics, pain medications, etc.) Yes   Is the patient taking all medications as directed (includes completed medication regime)? Yes   Does the patient have a follow up appointment scheduled with their surgeon? Yes   Has the patient kept scheduled appointments due by today? Yes   What is the Home health agency?  [facility]   Has home health visited the patient within 72 hours of discharge? Yes   Psychosocial issues? No   Did the patient receive a copy of their discharge instructions? Yes   Nursing interventions Reviewed instructions with patient   What is the patient's perception of their health status since discharge? Improving   Nursing interventions Nurse provided patient education   Is the patient /caregiver able to teach back basic post-op care? Do not remove steri-strips, Keep incision areas clean,dry and protected, No tub bath, swimming, or hot tub until instructed by MD, Take showers only when approved by MD-sponge bathe until then   Is the patient/caregiver able to teach back signs and symptoms of incisional infection? Increased redness, swelling or pain at the incisonal site, Increased drainage or bleeding, Incisional warmth, Pus or odor from incision, Fever   Is the patient/caregiver able to teach back steps to recovery  at home? Set small, achievable goals for return to baseline health, Rest and rebuild strength, gradually increase activity, Eat a well-balance diet   Is the patient/caregiver able to teach back the hierarchy of who to call/visit for symptoms/problems? PCP, Specialist, Home health nurse, Urgent Care, ED, 911 Yes   Additional teach back comments states has had diarrhea since surgery, has discussed with PCP and HH RN's, has tried to avoid dairy, takes Imodium   Week 2 call completed? Yes   Call end time 1613            Naomie JULIEN - Registered Nurse

## 2023-09-23 ENCOUNTER — APPOINTMENT (OUTPATIENT)
Dept: CT IMAGING | Facility: HOSPITAL | Age: 83
End: 2023-09-23
Payer: MEDICARE

## 2023-09-23 ENCOUNTER — APPOINTMENT (OUTPATIENT)
Dept: GENERAL RADIOLOGY | Facility: HOSPITAL | Age: 83
End: 2023-09-23
Payer: MEDICARE

## 2023-09-23 ENCOUNTER — HOSPITAL ENCOUNTER (OUTPATIENT)
Facility: HOSPITAL | Age: 83
LOS: 2 days | Discharge: HOME | End: 2023-09-25
Attending: EMERGENCY MEDICINE | Admitting: INTERNAL MEDICINE
Payer: MEDICARE

## 2023-09-23 DIAGNOSIS — R41.82 ALTERED MENTAL STATUS, UNSPECIFIED ALTERED MENTAL STATUS TYPE: Primary | ICD-10-CM

## 2023-09-23 DIAGNOSIS — G89.4 CHRONIC PAIN SYNDROME: Chronic | ICD-10-CM

## 2023-09-23 DIAGNOSIS — N39.0 ACUTE UTI: ICD-10-CM

## 2023-09-23 PROBLEM — G93.40 ACUTE ENCEPHALOPATHY: Status: ACTIVE | Noted: 2022-08-19

## 2023-09-23 LAB
ALBUMIN SERPL-MCNC: 3.2 G/DL (ref 3.5–5.2)
ALBUMIN/GLOB SERPL: 0.9 G/DL
ALP SERPL-CCNC: 102 U/L (ref 39–117)
ALT SERPL W P-5'-P-CCNC: 24 U/L (ref 1–33)
AMMONIA BLD-SCNC: 26 UMOL/L (ref 11–51)
ANION GAP SERPL CALCULATED.3IONS-SCNC: 11 MMOL/L (ref 5–15)
AST SERPL-CCNC: 27 U/L (ref 1–32)
BACTERIA UR QL AUTO: ABNORMAL /HPF
BASOPHILS # BLD AUTO: 0.04 10*3/MM3 (ref 0–0.2)
BASOPHILS NFR BLD AUTO: 0.7 % (ref 0–1.5)
BILIRUB SERPL-MCNC: 0.3 MG/DL (ref 0–1.2)
BILIRUB UR QL STRIP: NEGATIVE
BUN SERPL-MCNC: 23 MG/DL (ref 8–23)
BUN/CREAT SERPL: 25.8 (ref 7–25)
CALCIUM SPEC-SCNC: 9.6 MG/DL (ref 8.6–10.5)
CHLORIDE SERPL-SCNC: 102 MMOL/L (ref 98–107)
CLARITY UR: ABNORMAL
CO2 SERPL-SCNC: 24 MMOL/L (ref 22–29)
COLOR UR: YELLOW
CREAT SERPL-MCNC: 0.89 MG/DL (ref 0.57–1)
D DIMER PPP FEU-MCNC: 0.75 MCGFEU/ML (ref 0–0.81)
D-LACTATE SERPL-SCNC: 1 MMOL/L (ref 0.5–2)
DEPRECATED RDW RBC AUTO: 54.5 FL (ref 37–54)
EGFRCR SERPLBLD CKD-EPI 2021: 65.2 ML/MIN/1.73
EOSINOPHIL # BLD AUTO: 0.38 10*3/MM3 (ref 0–0.4)
EOSINOPHIL NFR BLD AUTO: 6.5 % (ref 0.3–6.2)
ERYTHROCYTE [DISTWIDTH] IN BLOOD BY AUTOMATED COUNT: 15.2 % (ref 12.3–15.4)
GLOBULIN UR ELPH-MCNC: 3.4 GM/DL
GLUCOSE SERPL-MCNC: 106 MG/DL (ref 65–99)
GLUCOSE UR STRIP-MCNC: NEGATIVE MG/DL
HCT VFR BLD AUTO: 30.4 % (ref 34–46.6)
HGB BLD-MCNC: 9.5 G/DL (ref 12–15.9)
HGB UR QL STRIP.AUTO: ABNORMAL
HYALINE CASTS UR QL AUTO: ABNORMAL /LPF
IMM GRANULOCYTES # BLD AUTO: 0.01 10*3/MM3 (ref 0–0.05)
IMM GRANULOCYTES NFR BLD AUTO: 0.2 % (ref 0–0.5)
KETONES UR QL STRIP: NEGATIVE
LEUKOCYTE ESTERASE UR QL STRIP.AUTO: ABNORMAL
LYMPHOCYTES # BLD AUTO: 1.04 10*3/MM3 (ref 0.7–3.1)
LYMPHOCYTES NFR BLD AUTO: 17.9 % (ref 19.6–45.3)
MAGNESIUM SERPL-MCNC: 1.6 MG/DL (ref 1.6–2.4)
MCH RBC QN AUTO: 30.8 PG (ref 26.6–33)
MCHC RBC AUTO-ENTMCNC: 31.3 G/DL (ref 31.5–35.7)
MCV RBC AUTO: 98.7 FL (ref 79–97)
MONOCYTES # BLD AUTO: 0.83 10*3/MM3 (ref 0.1–0.9)
MONOCYTES NFR BLD AUTO: 14.3 % (ref 5–12)
NEUTROPHILS NFR BLD AUTO: 3.52 10*3/MM3 (ref 1.7–7)
NEUTROPHILS NFR BLD AUTO: 60.4 % (ref 42.7–76)
NITRITE UR QL STRIP: POSITIVE
NRBC BLD AUTO-RTO: 0 /100 WBC (ref 0–0.2)
NT-PROBNP SERPL-MCNC: 1209 PG/ML (ref 0–1800)
PH UR STRIP.AUTO: 6 [PH] (ref 5–8)
PLATELET # BLD AUTO: 229 10*3/MM3 (ref 140–450)
PMV BLD AUTO: 10.6 FL (ref 6–12)
POTASSIUM SERPL-SCNC: 3.3 MMOL/L (ref 3.5–5.2)
PROT SERPL-MCNC: 6.6 G/DL (ref 6–8.5)
PROT UR QL STRIP: ABNORMAL
RBC # BLD AUTO: 3.08 10*6/MM3 (ref 3.77–5.28)
RBC # UR STRIP: ABNORMAL /HPF
REF LAB TEST METHOD: ABNORMAL
SODIUM SERPL-SCNC: 137 MMOL/L (ref 136–145)
SP GR UR STRIP: 1.02 (ref 1–1.03)
SQUAMOUS #/AREA URNS HPF: ABNORMAL /HPF
UROBILINOGEN UR QL STRIP: ABNORMAL
WBC # UR STRIP: ABNORMAL /HPF
WBC NRBC COR # BLD: 5.82 10*3/MM3 (ref 3.4–10.8)

## 2023-09-23 PROCEDURE — 87186 SC STD MICRODIL/AGAR DIL: CPT | Performed by: EMERGENCY MEDICINE

## 2023-09-23 PROCEDURE — 85379 FIBRIN DEGRADATION QUANT: CPT | Performed by: EMERGENCY MEDICINE

## 2023-09-23 PROCEDURE — P9612 CATHETERIZE FOR URINE SPEC: HCPCS

## 2023-09-23 PROCEDURE — 93010 ELECTROCARDIOGRAM REPORT: CPT | Performed by: STUDENT IN AN ORGANIZED HEALTH CARE EDUCATION/TRAINING PROGRAM

## 2023-09-23 PROCEDURE — 87088 URINE BACTERIA CULTURE: CPT | Performed by: EMERGENCY MEDICINE

## 2023-09-23 PROCEDURE — G0378 HOSPITAL OBSERVATION PER HR: HCPCS

## 2023-09-23 PROCEDURE — 99284 EMERGENCY DEPT VISIT MOD MDM: CPT

## 2023-09-23 PROCEDURE — 82140 ASSAY OF AMMONIA: CPT | Performed by: EMERGENCY MEDICINE

## 2023-09-23 PROCEDURE — 83735 ASSAY OF MAGNESIUM: CPT | Performed by: EMERGENCY MEDICINE

## 2023-09-23 PROCEDURE — 87040 BLOOD CULTURE FOR BACTERIA: CPT | Performed by: EMERGENCY MEDICINE

## 2023-09-23 PROCEDURE — 87086 URINE CULTURE/COLONY COUNT: CPT | Performed by: EMERGENCY MEDICINE

## 2023-09-23 PROCEDURE — 25010000002 CEFTRIAXONE PER 250 MG: Performed by: EMERGENCY MEDICINE

## 2023-09-23 PROCEDURE — 93005 ELECTROCARDIOGRAM TRACING: CPT | Performed by: EMERGENCY MEDICINE

## 2023-09-23 PROCEDURE — 83880 ASSAY OF NATRIURETIC PEPTIDE: CPT | Performed by: EMERGENCY MEDICINE

## 2023-09-23 PROCEDURE — 96365 THER/PROPH/DIAG IV INF INIT: CPT

## 2023-09-23 PROCEDURE — 81001 URINALYSIS AUTO W/SCOPE: CPT | Performed by: EMERGENCY MEDICINE

## 2023-09-23 PROCEDURE — 85025 COMPLETE CBC W/AUTO DIFF WBC: CPT | Performed by: EMERGENCY MEDICINE

## 2023-09-23 PROCEDURE — 70450 CT HEAD/BRAIN W/O DYE: CPT

## 2023-09-23 PROCEDURE — 80053 COMPREHEN METABOLIC PANEL: CPT | Performed by: EMERGENCY MEDICINE

## 2023-09-23 PROCEDURE — 83605 ASSAY OF LACTIC ACID: CPT | Performed by: EMERGENCY MEDICINE

## 2023-09-23 PROCEDURE — 71045 X-RAY EXAM CHEST 1 VIEW: CPT

## 2023-09-23 PROCEDURE — 36415 COLL VENOUS BLD VENIPUNCTURE: CPT

## 2023-09-23 RX ORDER — ATORVASTATIN CALCIUM 40 MG/1
40 TABLET, FILM COATED ORAL DAILY
Status: DISCONTINUED | OUTPATIENT
Start: 2023-09-24 | End: 2023-09-25 | Stop reason: HOSPADM

## 2023-09-23 RX ORDER — ENOXAPARIN SODIUM 100 MG/ML
40 INJECTION SUBCUTANEOUS DAILY
Status: DISCONTINUED | OUTPATIENT
Start: 2023-09-24 | End: 2023-09-25 | Stop reason: HOSPADM

## 2023-09-23 RX ORDER — SODIUM CHLORIDE 0.9 % (FLUSH) 0.9 %
10 SYRINGE (ML) INJECTION AS NEEDED
Status: DISCONTINUED | OUTPATIENT
Start: 2023-09-23 | End: 2023-09-25 | Stop reason: HOSPADM

## 2023-09-23 RX ORDER — ACETAMINOPHEN 325 MG/1
650 TABLET ORAL EVERY 4 HOURS PRN
Status: DISCONTINUED | OUTPATIENT
Start: 2023-09-23 | End: 2023-09-25 | Stop reason: HOSPADM

## 2023-09-23 RX ORDER — OXYBUTYNIN CHLORIDE 5 MG/1
10 TABLET, EXTENDED RELEASE ORAL DAILY
Status: DISCONTINUED | OUTPATIENT
Start: 2023-09-24 | End: 2023-09-25 | Stop reason: HOSPADM

## 2023-09-23 RX ORDER — LEVOTHYROXINE SODIUM 0.05 MG/1
50 TABLET ORAL
Status: DISCONTINUED | OUTPATIENT
Start: 2023-09-24 | End: 2023-09-25 | Stop reason: HOSPADM

## 2023-09-23 RX ORDER — SODIUM CHLORIDE 9 MG/ML
40 INJECTION, SOLUTION INTRAVENOUS AS NEEDED
Status: DISCONTINUED | OUTPATIENT
Start: 2023-09-23 | End: 2023-09-25 | Stop reason: HOSPADM

## 2023-09-23 RX ORDER — PANTOPRAZOLE SODIUM 40 MG/1
40 TABLET, DELAYED RELEASE ORAL
Status: DISCONTINUED | OUTPATIENT
Start: 2023-09-24 | End: 2023-09-25 | Stop reason: HOSPADM

## 2023-09-23 RX ORDER — SODIUM CHLORIDE 9 MG/ML
75 INJECTION, SOLUTION INTRAVENOUS CONTINUOUS
Status: DISCONTINUED | OUTPATIENT
Start: 2023-09-23 | End: 2023-09-25 | Stop reason: HOSPADM

## 2023-09-23 RX ORDER — POTASSIUM CHLORIDE 750 MG/1
40 CAPSULE, EXTENDED RELEASE ORAL EVERY 4 HOURS
Status: COMPLETED | OUTPATIENT
Start: 2023-09-24 | End: 2023-09-24

## 2023-09-23 RX ORDER — VENLAFAXINE 50 MG/1
25 TABLET ORAL DAILY
Status: DISCONTINUED | OUTPATIENT
Start: 2023-09-24 | End: 2023-09-25 | Stop reason: HOSPADM

## 2023-09-23 RX ORDER — SODIUM CHLORIDE 0.9 % (FLUSH) 0.9 %
10 SYRINGE (ML) INJECTION EVERY 12 HOURS SCHEDULED
Status: DISCONTINUED | OUTPATIENT
Start: 2023-09-23 | End: 2023-09-25 | Stop reason: HOSPADM

## 2023-09-23 RX ORDER — POLYETHYLENE GLYCOL 3350 17 G/17G
17 POWDER, FOR SOLUTION ORAL DAILY PRN
Status: DISCONTINUED | OUTPATIENT
Start: 2023-09-23 | End: 2023-09-25 | Stop reason: HOSPADM

## 2023-09-23 RX ORDER — ASPIRIN 81 MG/1
81 TABLET, CHEWABLE ORAL DAILY
Status: DISCONTINUED | OUTPATIENT
Start: 2023-09-24 | End: 2023-09-25 | Stop reason: HOSPADM

## 2023-09-23 RX ORDER — SERTRALINE HYDROCHLORIDE 25 MG/1
25 TABLET, FILM COATED ORAL DAILY
Status: DISCONTINUED | OUTPATIENT
Start: 2023-09-24 | End: 2023-09-25 | Stop reason: HOSPADM

## 2023-09-23 RX ORDER — CARVEDILOL 6.25 MG/1
12.5 TABLET ORAL 2 TIMES DAILY WITH MEALS
Status: DISCONTINUED | OUTPATIENT
Start: 2023-09-23 | End: 2023-09-25 | Stop reason: HOSPADM

## 2023-09-23 RX ORDER — CYCLOBENZAPRINE HCL 10 MG
10 TABLET ORAL 3 TIMES DAILY PRN
Status: DISCONTINUED | OUTPATIENT
Start: 2023-09-23 | End: 2023-09-25 | Stop reason: HOSPADM

## 2023-09-23 RX ORDER — DONEPEZIL HYDROCHLORIDE 10 MG/1
10 TABLET, FILM COATED ORAL NIGHTLY
Status: DISCONTINUED | OUTPATIENT
Start: 2023-09-23 | End: 2023-09-25 | Stop reason: HOSPADM

## 2023-09-23 RX ORDER — OXYCODONE HYDROCHLORIDE 5 MG/1
15 TABLET ORAL 4 TIMES DAILY
Status: DISCONTINUED | OUTPATIENT
Start: 2023-09-23 | End: 2023-09-24

## 2023-09-23 RX ADMIN — CARVEDILOL 12.5 MG: 6.25 TABLET, FILM COATED ORAL at 23:45

## 2023-09-23 RX ADMIN — POTASSIUM CHLORIDE 40 MEQ: 10 CAPSULE, COATED, EXTENDED RELEASE ORAL at 23:45

## 2023-09-23 RX ADMIN — SODIUM CHLORIDE 75 ML/HR: 9 INJECTION, SOLUTION INTRAVENOUS at 23:47

## 2023-09-23 RX ADMIN — DONEPEZIL HYDROCHLORIDE 10 MG: 10 TABLET, FILM COATED ORAL at 23:46

## 2023-09-23 RX ADMIN — CEFTRIAXONE SODIUM 2000 MG: 2 INJECTION, POWDER, FOR SOLUTION INTRAMUSCULAR; INTRAVENOUS at 20:33

## 2023-09-23 RX ADMIN — OXYCODONE HYDROCHLORIDE 15 MG: 5 TABLET ORAL at 23:46

## 2023-09-23 RX ADMIN — Medication 10 ML: at 23:47

## 2023-09-24 ENCOUNTER — READMISSION MANAGEMENT (OUTPATIENT)
Dept: CALL CENTER | Facility: HOSPITAL | Age: 83
End: 2023-09-24
Payer: MEDICARE

## 2023-09-24 PROBLEM — Z79.899 POLYPHARMACY: Status: ACTIVE | Noted: 2023-09-24

## 2023-09-24 LAB
ANION GAP SERPL CALCULATED.3IONS-SCNC: 8 MMOL/L (ref 5–15)
BASOPHILS # BLD AUTO: 0.02 10*3/MM3 (ref 0–0.2)
BASOPHILS NFR BLD AUTO: 0.5 % (ref 0–1.5)
BUN SERPL-MCNC: 14 MG/DL (ref 8–23)
BUN/CREAT SERPL: 20.9 (ref 7–25)
CALCIUM SPEC-SCNC: 8.8 MG/DL (ref 8.6–10.5)
CHLORIDE SERPL-SCNC: 108 MMOL/L (ref 98–107)
CO2 SERPL-SCNC: 26 MMOL/L (ref 22–29)
CREAT SERPL-MCNC: 0.67 MG/DL (ref 0.57–1)
DEPRECATED RDW RBC AUTO: 55.4 FL (ref 37–54)
EGFRCR SERPLBLD CKD-EPI 2021: 87.9 ML/MIN/1.73
EOSINOPHIL # BLD AUTO: 0.22 10*3/MM3 (ref 0–0.4)
EOSINOPHIL NFR BLD AUTO: 5.9 % (ref 0.3–6.2)
ERYTHROCYTE [DISTWIDTH] IN BLOOD BY AUTOMATED COUNT: 15 % (ref 12.3–15.4)
GLUCOSE SERPL-MCNC: 97 MG/DL (ref 65–99)
HCT VFR BLD AUTO: 27.9 % (ref 34–46.6)
HGB BLD-MCNC: 8.4 G/DL (ref 12–15.9)
IMM GRANULOCYTES # BLD AUTO: 0.01 10*3/MM3 (ref 0–0.05)
IMM GRANULOCYTES NFR BLD AUTO: 0.3 % (ref 0–0.5)
LYMPHOCYTES # BLD AUTO: 1.03 10*3/MM3 (ref 0.7–3.1)
LYMPHOCYTES NFR BLD AUTO: 27.6 % (ref 19.6–45.3)
MCH RBC QN AUTO: 30.7 PG (ref 26.6–33)
MCHC RBC AUTO-ENTMCNC: 30.1 G/DL (ref 31.5–35.7)
MCV RBC AUTO: 101.8 FL (ref 79–97)
MONOCYTES # BLD AUTO: 0.6 10*3/MM3 (ref 0.1–0.9)
MONOCYTES NFR BLD AUTO: 16.1 % (ref 5–12)
NEUTROPHILS NFR BLD AUTO: 1.85 10*3/MM3 (ref 1.7–7)
NEUTROPHILS NFR BLD AUTO: 49.6 % (ref 42.7–76)
NRBC BLD AUTO-RTO: 0 /100 WBC (ref 0–0.2)
PLATELET # BLD AUTO: 177 10*3/MM3 (ref 140–450)
PMV BLD AUTO: 11.2 FL (ref 6–12)
POTASSIUM SERPL-SCNC: 4.3 MMOL/L (ref 3.5–5.2)
QT INTERVAL: 380 MS
QTC INTERVAL: 480 MS
RBC # BLD AUTO: 2.74 10*6/MM3 (ref 3.77–5.28)
SODIUM SERPL-SCNC: 142 MMOL/L (ref 136–145)
WBC NRBC COR # BLD: 3.73 10*3/MM3 (ref 3.4–10.8)

## 2023-09-24 PROCEDURE — G0378 HOSPITAL OBSERVATION PER HR: HCPCS

## 2023-09-24 PROCEDURE — 85025 COMPLETE CBC W/AUTO DIFF WBC: CPT | Performed by: FAMILY MEDICINE

## 2023-09-24 PROCEDURE — 96372 THER/PROPH/DIAG INJ SC/IM: CPT

## 2023-09-24 PROCEDURE — 25010000002 ENOXAPARIN PER 10 MG: Performed by: FAMILY MEDICINE

## 2023-09-24 PROCEDURE — 80048 BASIC METABOLIC PNL TOTAL CA: CPT | Performed by: FAMILY MEDICINE

## 2023-09-24 PROCEDURE — 25010000002 CEFTRIAXONE PER 250 MG: Performed by: FAMILY MEDICINE

## 2023-09-24 RX ORDER — OXYCODONE HYDROCHLORIDE 5 MG/1
15 TABLET ORAL EVERY 6 HOURS PRN
Status: DISCONTINUED | OUTPATIENT
Start: 2023-09-24 | End: 2023-09-25 | Stop reason: HOSPADM

## 2023-09-24 RX ADMIN — Medication 10 ML: at 21:55

## 2023-09-24 RX ADMIN — CARVEDILOL 12.5 MG: 6.25 TABLET, FILM COATED ORAL at 17:13

## 2023-09-24 RX ADMIN — ATORVASTATIN CALCIUM 40 MG: 40 TABLET, FILM COATED ORAL at 08:55

## 2023-09-24 RX ADMIN — LEVOTHYROXINE SODIUM 50 MCG: 50 TABLET ORAL at 05:56

## 2023-09-24 RX ADMIN — VENLAFAXINE 25 MG: 50 TABLET ORAL at 08:55

## 2023-09-24 RX ADMIN — ACETAMINOPHEN 650 MG: 325 TABLET, FILM COATED ORAL at 03:17

## 2023-09-24 RX ADMIN — SERTRALINE HYDROCHLORIDE 25 MG: 25 TABLET ORAL at 08:55

## 2023-09-24 RX ADMIN — OXYCODONE HYDROCHLORIDE 15 MG: 5 TABLET ORAL at 17:18

## 2023-09-24 RX ADMIN — ACETAMINOPHEN 650 MG: 325 TABLET, FILM COATED ORAL at 17:18

## 2023-09-24 RX ADMIN — OXYBUTYNIN CHLORIDE 10 MG: 5 TABLET, EXTENDED RELEASE ORAL at 08:55

## 2023-09-24 RX ADMIN — DONEPEZIL HYDROCHLORIDE 10 MG: 10 TABLET, FILM COATED ORAL at 21:54

## 2023-09-24 RX ADMIN — Medication 10 ML: at 09:01

## 2023-09-24 RX ADMIN — ASPIRIN 81 MG: 81 TABLET, CHEWABLE ORAL at 08:55

## 2023-09-24 RX ADMIN — CEFTRIAXONE 1000 MG: 1 INJECTION, POWDER, FOR SOLUTION INTRAMUSCULAR; INTRAVENOUS at 21:55

## 2023-09-24 RX ADMIN — PANTOPRAZOLE SODIUM 40 MG: 40 TABLET, DELAYED RELEASE ORAL at 05:56

## 2023-09-24 RX ADMIN — POTASSIUM CHLORIDE 40 MEQ: 10 CAPSULE, COATED, EXTENDED RELEASE ORAL at 03:17

## 2023-09-24 RX ADMIN — CYCLOBENZAPRINE 10 MG: 10 TABLET, FILM COATED ORAL at 01:01

## 2023-09-24 RX ADMIN — ENOXAPARIN SODIUM 40 MG: 100 INJECTION SUBCUTANEOUS at 08:56

## 2023-09-24 NOTE — PLAN OF CARE
Goal Outcome Evaluation:  Patient arrived on this unit from ER. UTI. Alert, some confusion. , 96%

## 2023-09-24 NOTE — ED PROVIDER NOTES
"Subjective   History of Present Illness  Patient is brought to emergency room by ambulance with a complaint of altered mental status.  The patient is confused and has a hard time getting information out to us.  However her daughter tells me that she had gallbladder surgery about 2 weeks ago where she had emergency surgery because of an infected gallbladder.  She has been home for couple weeks but has been weak since that time.  She stays in bed most the time.  However she has been alert and oriented this whole time.  Today the daughter says that she suddenly became confused and not responding as well.  She is going to \"a trance\" and then the daughter had to pull her out of it but she was to be confused.  She could not tell EMS her name and address.  There is no reported fever or chills or cough or congestion or dysuria.  Daughter says she has had some diarrhea since her surgery.  She did have some problems with constipation earlier but is now having profuse diarrhea.  Never had anything like this before.    History provided by:  Relative  History limited by:  Mental status change   used: No    Altered Mental Status  Presenting symptoms: behavior changes, confusion and lethargy    Severity:  Moderate  Most recent episode:  Today  Episode history:  Single  Duration:  2 hours  Timing:  Constant  Progression:  Unchanged  Chronicity:  New  Context: recent change in medication, recent illness and recent infection    Context: not alcohol use, not dementia, not drug use, not head injury, not homeless, taking medications as prescribed and not nursing home resident    Associated symptoms: weakness    Associated symptoms: no abdominal pain, normal movement, no agitation, no bladder incontinence, no decreased appetite, no depression, no difficulty breathing, no eye deviation, no fever, no hallucinations, no headaches, no light-headedness, no nausea, no palpitations, no rash, no seizures, no slurred speech, " no suicidal behavior, no visual change and no vomiting      Review of Systems   Constitutional:  Negative for decreased appetite and fever.   HENT: Negative.     Respiratory: Negative.     Cardiovascular: Negative.  Negative for palpitations.   Gastrointestinal: Negative.  Negative for abdominal pain, nausea and vomiting.   Genitourinary: Negative.  Negative for bladder incontinence.   Musculoskeletal: Negative.    Skin: Negative.  Negative for rash.   Neurological:  Positive for weakness. Negative for seizures, light-headedness and headaches.   Psychiatric/Behavioral:  Positive for confusion. Negative for agitation and hallucinations.    All other systems reviewed and are negative.    Past Medical History:   Diagnosis Date    Anxiety     Arthritis     Bronchitis     Cancer     uterine    Chronic pain     Depression     Disease of thyroid gland     Fibromyalgia     GERD (gastroesophageal reflux disease)     Headache     History of transfusion     AS     Hyperlipidemia     Hypertension     Incontinence     Insomnia     Leg pain     Lumbar stenosis     Migraines     Peptic ulcer     Restless legs     Sleep apnea     NO C-PAP    UTI (urinary tract infection)     Vaginal bleeding        Allergies   Allergen Reactions    Ropinirole Hcl Shortness Of Breath    Codeine Itching and Mental Status Change    Definity [Perflutren Lipid Microsphere] Other (See Comments)     Severe back pain    Ambien [Zolpidem] Other (See Comments)     HYPER     Eszopiclone Other (See Comments)     MAKES PT HYPER     Pregabalin Dizziness    Tizanidine Other (See Comments)     Terrible nightmares       Past Surgical History:   Procedure Laterality Date    BLADDER REPAIR      MESH HAD TO BE REMOVED IN 2013    BREAST BIOPSY Right 2017    benign    BREAST CYST EXCISION Left     CARDIAC CATHETERIZATION      CARPAL TUNNEL RELEASE      CATARACT EXTRACTION W/ INTRAOCULAR LENS  IMPLANT, BILATERAL      CHOLECYSTECTOMY WITH INTRAOPERATIVE  CHOLANGIOGRAM N/A 9/7/2023    Procedure: CHOLECYSTECTOMY LAPAROSCOPIC INTRAOPERATIVE CHOLANGIOGRAM;  Surgeon: Gerardo Arciniega MD;  Location:  [facility] OR;  Service: General;  Laterality: N/A;    COLONOSCOPY      COLONOSCOPY N/A 10/01/2021    Procedure: COLONOSCOPY WITH ANESTHESIA;  Surgeon: Tom Velasco DO;  Location: [facility] ENDOSCOPY;  Service: Gastroenterology;  Laterality: N/A;  pre: change in bowel habits  post: diverticulosis. hemorrhoids.   Olivia Mora APRN        CYSTECTOMY      D & C HYSTEROSCOPY N/A 11/06/2017    Procedure: DILATATION AND CURETTAGE HYSTEROSCOPY;  Surgeon: Shasta Madrigal MD;  Location: [facility] OR;  Service:     DILATION AND CURETTAGE, DIAGNOSTIC / THERAPEUTIC  2008    ENDOSCOPY  09/23/2010    Short segment of Arriola's,Moderate chroninc esophagogastritis and negative H.pylori    ENDOSCOPY N/A 09/25/2017    Procedure: ESOPHAGOGASTRODUODENOSCOPY WITH ANESTHESIA;  Surgeon: Tom Velasco DO;  Location: [facility] ENDOSCOPY;  Service:     EYE SURGERY      RETINA    HEMORRHOIDECTOMY SIGMOIDOSCOPY N/A 3/21/2023    Procedure: HEMORRHOIDECTOMY WITH EXAM UNDER ANESTHESIA;  Surgeon: Holly Chavez MD;  Location: [facility] OR;  Service: General;  Laterality: N/A;    HYSTERECTOMY  12/20/2017    ORIF TIBIA/FIBULA FRACTURES Left 2000    TRANSVAGINAL TAPING SUSPENSION N/A 11/06/2017    Procedure: VAGINAL MESH REVISION;  Surgeon: Shasta Madrigal MD;  Location:  [facility] OR;  Service:     VAGINAL MESH REVISION  2013       Family History   Problem Relation Age of Onset    Diabetes Mother     Multiple myeloma Mother     Stroke Father     Diabetes Sister     Prostate cancer Brother     Lymphoma Brother         NHL    Ovarian cancer Paternal Aunt     Cancer Paternal Grandmother         metastatic    Lung cancer Paternal Grandfather     Colon cancer Neg Hx     Esophageal cancer Neg Hx     Breast cancer Neg Hx        Social History     Socioeconomic History    Marital status:    Tobacco Use     Smoking status: Never    Smokeless tobacco: Never   Vaping Use    Vaping Use: Never used   Substance and Sexual Activity    Alcohol use: Not Currently     Comment: occasional    Drug use: No    Sexual activity: Defer       Prior to Admission medications    Medication Sig Start Date End Date Taking? Authorizing Provider   aspirin 81 MG chewable tablet Chew 1 tablet Daily.    Tamiko Gaviria MD   atorvastatin (LIPITOR) 40 MG tablet Take 1 tablet by mouth Daily. 1/16/23   Tamiko Gaviria MD   butalbital-acetaminophen-caffeine (FIORICET, ESGIC) -40 MG per tablet Take 1 tablet by mouth 2 (Two) Times a Day.    Tamiko Gaviria MD   carvedilol (COREG) 12.5 MG tablet Take 1 tablet by mouth 2 (Two) Times a Day With Meals. 8/28/19   Tamiko Gaviria MD   cyclobenzaprine (FLEXERIL) 10 MG tablet Take 1 tablet by mouth 3 (Three) Times a Day As Needed for Muscle Spasms. 11/17/22   Tamiko Gaviria MD   donepezil (ARICEPT) 10 MG tablet Take 1 tablet by mouth Every Night. 10/18/22   Tamiko Gaviria MD   ergocalciferol (ERGOCALCIFEROL) 11331 units capsule Take 1 capsule by mouth 1 (One) Time Per Week. Saturday 4/17/19   Tamiko Gaviria MD   ipratropium (ATROVENT) 0.06 % nasal spray 2 sprays into the nostril(s) as directed by provider 2 (Two) Times a Day. 11/23/22   Tamiko Gaviria MD   LACTASE ENZYME PO Take 3 tablets by mouth As Needed (takes before dairy products).    Tamiko Gaviria MD   lansoprazole (PREVACID) 30 MG capsule Take 1 capsule by mouth Every Morning. 3/19/20   Tamiko Gaviria MD   levothyroxine (SYNTHROID, LEVOTHROID) 50 MCG tablet Take 1 tablet by mouth Every Morning.    Tamiko Gaviria MD   nystatin (MYCOSTATIN) 888757 UNIT/GM cream Apply 1 application  topically to the appropriate area as directed 2 (Two) Times a Day.    Tamiko Gaviria MD   oxyCODONE (ROXICODONE) 15 MG immediate release tablet Take 1 tablet by mouth 4 (Four) Times a Day.  11/21/22   Tamiko Gaviria MD   polyethylene glycol (MiraLax) 17 g packet Take 17 g by mouth Daily As Needed (Constipation).    Tamiko Gaviria MD   sertraline (ZOLOFT) 25 MG tablet Take 1 tablet by mouth Daily. 2/28/23 2/28/24  Shasta Madrigal MD   solifenacin (VESICARE) 10 MG tablet Take 1 tablet by mouth Daily. 2/27/23 2/28/24  Tamiko Gaviria MD   SUMAtriptan (IMITREX) 50 MG tablet Take 1 tablet by mouth 2 (Two) Times a Day As Needed for Migraine. 7/29/19   Shasta Madrigal MD   venlafaxine (EFFEXOR) 25 MG tablet Take 1 tablet by mouth Daily. 2/28/23   Shasta Madrigal MD       Medications   sodium chloride 0.9 % flush 10 mL (has no administration in time range)   cefTRIAXone (ROCEPHIN) 2,000 mg in sodium chloride 0.9 % 100 mL IVPB (2,000 mg Intravenous New Bag 9/23/23 2033)       Vitals:    09/23/23 2001   BP: 141/49   Pulse: 95   Resp:    Temp:    SpO2: 92%         Objective   Physical Exam  Vitals and nursing note reviewed.   Constitutional:       Appearance: She is well-developed.   HENT:      Head: Normocephalic and atraumatic.   Eyes:      Extraocular Movements: Extraocular movements intact.      Pupils: Pupils are equal, round, and reactive to light.   Cardiovascular:      Rate and Rhythm: Normal rate and regular rhythm.   Pulmonary:      Effort: Pulmonary effort is normal.      Breath sounds: Normal breath sounds.   Abdominal:      General: Bowel sounds are normal.      Palpations: Abdomen is soft.   Musculoskeletal:         General: Normal range of motion.      Cervical back: Normal range of motion and neck supple.   Skin:     General: Skin is warm and dry.   Neurological:      Mental Status: She is alert. She is lethargic and confused.   Psychiatric:         Mood and Affect: Mood normal.         Behavior: Behavior normal.       Procedures         Lab Results (last 24 hours)       Procedure Component Value Units Date/Time    Blood Culture - Blood, Arm, Left [[id number]] Collected:  09/23/23 1905    Specimen: Blood from Arm, Left Updated: 09/23/23 1928    CBC & Differential [[id number]]  (Abnormal) Collected: 09/23/23 1911    Specimen: Blood Updated: 09/23/23 1929    Narrative:      The following orders were created for panel order CBC & Differential.  Procedure                               Abnormality         Status                     ---------                               -----------         ------                     CBC Auto Differential[[id number]]        Abnormal            Final result                 Please view results for these tests on the individual orders.    Comprehensive Metabolic Panel [[id number]]  (Abnormal) Collected: 09/23/23 1911    Specimen: Blood Updated: 09/23/23 1948     Glucose 106 mg/dL      BUN 23 mg/dL      Creatinine 0.89 mg/dL      Sodium 137 mmol/L      Potassium 3.3 mmol/L      Chloride 102 mmol/L      CO2 24.0 mmol/L      Calcium 9.6 mg/dL      Total Protein 6.6 g/dL      Albumin 3.2 g/dL      ALT (SGPT) 24 U/L      AST (SGOT) 27 U/L      Alkaline Phosphatase 102 U/L      Total Bilirubin 0.3 mg/dL      Globulin 3.4 gm/dL      A/G Ratio 0.9 g/dL      BUN/Creatinine Ratio 25.8     Anion Gap 11.0 mmol/L      eGFR 65.2 mL/min/1.73     Narrative:      GFR Normal >60  Chronic Kidney Disease <60  Kidney Failure <15    The GFR formula is only valid for adults with stable renal function between ages 18 and 70.    Lactic Acid, Plasma [[id number]]  (Normal) Collected: 09/23/23 1911    Specimen: Blood Updated: 09/23/23 1946     Lactate 1.0 mmol/L     Blood Culture - Blood, Arm, Right [[id number]] Collected: 09/23/23 1911    Specimen: Blood from Arm, Right Updated: 09/23/23 1928    Ammonia [[id number]]  (Normal) Collected: 09/23/23 1911    Specimen: Blood Updated: 09/23/23 1949     Ammonia 26 umol/L     Magnesium [[id number]]  (Normal) Collected: 09/23/23 1911    Specimen: Blood Updated: 09/23/23 1948     Magnesium 1.6 mg/dL     BNP [[id number]]  (Normal) Collected: 09/23/23  "1911    Specimen: Blood Updated: 09/23/23 1946     proBNP 1,209.0 pg/mL     Narrative:      Among patients with dyspnea, NT-proBNP is highly sensitive for the detection of acute congestive heart failure. In addition NT-proBNP of <300 pg/ml effectively rules out acute congestive heart failure with 99% negative predictive value.      D-dimer, Quantitative [[id number]]  (Normal) Collected: 09/23/23 1911    Specimen: Blood Updated: 09/23/23 1940     D-Dimer, Quantitative 0.75 MCGFEU/mL     Narrative:      According to the assay 's published package insert, a normal (<0.50 MCGFEU/mL) D-dimer result in conjunction with a non-high clinical probability assessment, excludes deep vein thrombosis (DVT) and pulmonary embolism (PE) with high sensitivity.    D-dimer values increase with age and this can make VTE exclusion of an older population difficult. To address this, the [employer], based on best available evidence and recent guidelines, recommends that clinicians use age-adjusted D-dimer thresholds in patients greater than 50 years of age with: a) a low probability of PE who do not meet all Pulmonary Embolism Rule Out Criteria, or b) in those with intermediate probability of PE.   The formula for an age-adjusted D-dimer cut-off is \"age/100\".  For example, a 60 year old patient would have an age-adjusted cut-off of 0.60 MCGFEU/mL and an 80 year old 0.80 MCGFEU/mL.    CBC Auto Differential [[id number]]  (Abnormal) Collected: 09/23/23 1911    Specimen: Blood Updated: 09/23/23 1929     WBC 5.82 10*3/mm3      RBC 3.08 10*6/mm3      Hemoglobin 9.5 g/dL      Hematocrit 30.4 %      MCV 98.7 fL      MCH 30.8 pg      MCHC 31.3 g/dL      RDW 15.2 %      RDW-SD 54.5 fl      MPV 10.6 fL      Platelets 229 10*3/mm3      Neutrophil % 60.4 %      Lymphocyte % 17.9 %      Monocyte % 14.3 %      Eosinophil % 6.5 %      Basophil % 0.7 %      Immature Grans % 0.2 %      Neutrophils, Absolute 3.52 10*3/mm3      " Lymphocytes, Absolute 1.04 10*3/mm3      Monocytes, Absolute 0.83 10*3/mm3      Eosinophils, Absolute 0.38 10*3/mm3      Basophils, Absolute 0.04 10*3/mm3      Immature Grans, Absolute 0.01 10*3/mm3      nRBC 0.0 /100 WBC     Urinalysis With Culture If Indicated - Straight Cath [[id number]]  (Abnormal) Collected: 09/23/23 1950    Specimen: Urine from Straight [facility] Updated: 09/23/23 2009     Color, UA Yellow     Appearance, UA Turbid     pH, UA 6.0     Specific Gravity, UA 1.017     Glucose, UA Negative     Ketones, UA Negative     Bilirubin, UA Negative     Blood, UA Trace     Protein, UA 30 mg/dL (1+)     Leuk Esterase, UA Large (3+)     Nitrite, UA Positive     Urobilinogen, UA 0.2 E.U./dL    Narrative:      In absence of clinical symptoms, the presence of pyuria, bacteria, and/or nitrites on the urinalysis result does not correlate with infection.    Urinalysis, Microscopic Only - [facility] [[id number]]  (Abnormal) Collected: 09/23/23 1950    Specimen: Urine from Straight [facility] Updated: 09/23/23 2009     RBC, UA 0-2 /HPF      WBC, UA Too Numerous to Count /HPF      Bacteria, UA 4+ /HPF      Squamous Epithelial Cells, UA 0-2 /HPF      Hyaline Casts, UA 0-2 /LPF      Methodology Automated Microscopy    Urine Culture - Urine, Straight [facility] [[id number]] Collected: 09/23/23 1950    Specimen: Urine from Straight [facility] Updated: 09/23/23 2009            CT Head Without Contrast   Final Result   Impression:         No acute intracranial abnormality.       Stable chronic small vessel ischemic changes.       Chronic right maxillary sinus disease with increased density, suggestive   of inspissated secretions or chronic allergic fungal sinusitis.       This report was signed and finalized on 9/23/2023 8:33 PM CDT by Sunny Degroot.          XR Chest 1 View   Final Result       Mild right perihilar prominence, vasculature versus lymphadenopathy.       No focal consolidation to suggest pneumonia.               This report was  signed and finalized on 9/23/2023 8:12 PM CDT by Sunny Degroot.              ED Course  ED Course as of 09/23/23 2102   Sat Sep 23, 2023   2059 Patient has a significant urinary tract infection.  She is much more clear in her thinking now but able to answer questions to me but still not completely oriented.  She talks about paperwork she should have bit better but cannot explain to me what type work she is talking about except admission and discharge paperwork.  The other work-up is fine.  I spoke with the patient's family and with the patient.  We will admit for further care. [TR]      ED Course User Index  [TR] Bo Santos Jr., MD          MDM  Number of Diagnoses or Management Options  Acute UTI: new and requires workup  Altered mental status, unspecified altered mental status type: new and requires workup     Amount and/or Complexity of Data Reviewed  Clinical lab tests: ordered and reviewed  Tests in the radiology section of CPT®: ordered and reviewed  Tests in the medicine section of CPT®: ordered and reviewed  Discuss the patient with other providers: yes    Risk of Complications, Morbidity, and/or Mortality  Presenting problems: moderate  Diagnostic procedures: moderate  Management options: moderate    Patient Progress  Patient progress: stable      Final diagnoses:   Altered mental status, unspecified altered mental status type   Acute UTI          Bo Santos Jr., MD  09/23/23 2102

## 2023-09-24 NOTE — PROGRESS NOTES
[facility]  INPATIENT PROGRESS NOTE    Patient Name: Jennifer Gomes  Date of Admission: 9/23/2023  Today's Date: 09/24/23  Length of Stay: 1  Primary Care Physician: Olivia Mora APRN    Subjective   Chief Complaint: Follow-up  HPI   Patient is a 83-year-old female who was admitted on 9/23 for an acute mental status change.  Patient was found to have an acute cystitis and she is currently on antibiotics.  Patient is also on significant amount of narcotics.  Review of [facility] showed patient is on 15 mg oxycodone 4 times a day, butalbital/acetaminophen/caffeine twice a day.      When patient was seen this morning.  Patient was in no acute distress.  However she reports history of chronic abdominal pain.  Vital signs stable.    Review of Systems   All pertinent negatives and positives are as above. All other systems have been reviewed and are negative unless otherwise stated.     Objective    Temp:  [97.8 °F (36.6 °C)-99 °F (37.2 °C)] 98.2 °F (36.8 °C)  Heart Rate:  [] 86  Resp:  [17-22] 17  BP: (121-159)/() 134/43  Physical Exam  Vitals and nursing note reviewed.   Constitutional:       General: She is not in acute distress.     Appearance: Normal appearance. She is not diaphoretic.   HENT:      Head: Normocephalic and atraumatic.      Right Ear: External ear normal.      Left Ear: External ear normal.   Eyes:      General: No scleral icterus.  Cardiovascular:      Rate and Rhythm: Normal rate and regular rhythm.      Heart sounds: Normal heart sounds.   Pulmonary:      Effort: Pulmonary effort is normal. No respiratory distress.      Breath sounds: Normal breath sounds.   Chest:      Chest wall: No tenderness.   Abdominal:      General: Bowel sounds are normal.      Palpations: Abdomen is soft.      Tenderness: There is abdominal tenderness.   Musculoskeletal:         General: No swelling or tenderness.      Cervical back: Normal range of  motion and neck supple.      Right lower leg: No edema.      Left lower leg: No edema.   Skin:     General: Skin is warm and dry.      Capillary Refill: Capillary refill takes less than 2 seconds.      Findings: No erythema or rash.   Neurological:      General: No focal deficit present.      Mental Status: She is alert.      Motor: No weakness.   Psychiatric:         Behavior: Behavior normal.           Results Review:  I have reviewed the labs, radiology results, and diagnostic studies.    Laboratory Data:   Results from last 7 days   Lab Units 09/24/23  0904 09/23/23 1911 09/19/23  1805   WBC 10*3/mm3 3.73 5.82 7.8   HEMOGLOBIN g/dL 8.4* 9.5* 10.1*   HEMATOCRIT % 27.9* 30.4* 31.7*   PLATELETS 10*3/mm3 177 229 328        Results from last 7 days   Lab Units 09/24/23 0904 09/23/23 1911   SODIUM mmol/L 142 137   POTASSIUM mmol/L 4.3 3.3*   CHLORIDE mmol/L 108* 102   CO2 mmol/L 26.0 24.0   BUN mg/dL 14 23   CREATININE mg/dL 0.67 0.89   CALCIUM mg/dL 8.8 9.6   BILIRUBIN mg/dL  --  0.3   ALK PHOS U/L  --  102   ALT (SGPT) U/L  --  24   AST (SGOT) U/L  --  27   GLUCOSE mg/dL 97 106*       Culture Data:   No results found for: BLOODCX, URINECX, WOUNDCX, MRSACX, RESPCX, STOOLCX    Radiology Data:   Imaging Results (Last 24 Hours)       Procedure Component Value Units Date/Time    CT Head Without Contrast [[id number]] Collected: 09/23/23 2027     Updated: 09/23/23 2036    Narrative:      Exam: CT HEAD WO CONTRAST- 9/23/2023 8:11 PM CDT     HISTORY: Mental status change, unknown cause       DOSE LENGTH PRODUCT: 648 mGy cm. Automated exposure control was also  utilized to decrease patient radiation dose.     Technique:   Helically acquired CT of the brain without IV contrast was performed.  Sagittal and coronal reformations are also provided for review. Soft  tissue and bone kernels are available for interpretation.     Comparison: 9/4/2023.     Findings:      Ventricles and extra-axial CSF spaces are normal in size.      No intraparenchymal or extra-axial hemorrhage.     Moderate periventricular white matter low-attenuation is unchanged. No  new attenuation abnormality.     Status post cataracts. Opacification of the right maxillary sinus with  increased density and reactive chronic wall thickening. Mild left  maxillary mucosal thickening. Mastoid air cells are grossly clear.     No suspicious calvarial or extracranial soft tissue abnormality.     Other:None.       Impression:      Impression:       No acute intracranial abnormality.     Stable chronic small vessel ischemic changes.     Chronic right maxillary sinus disease with increased density, suggestive  of inspissated secretions or chronic allergic fungal sinusitis.     This report was signed and finalized on 9/23/2023 8:33 PM CDT by Sunny Degroot.       XR Chest 1 View [[id number]] Collected: 09/23/23 2010     Updated: 09/23/23 2015    Narrative:      EXAM: XR CHEST 1 VW- 9/23/2023 7:02 PM CDT     HISTORY: weakness, altered mental status.     COMPARISON: 9/4/2023.     TECHNIQUE: Single frontal radiograph of the chest was obtained.     FINDINGS:      Support Devices: None.     Cardiac and Mediastinal Silhouettes: Normal.     Lungs/Pleura: Mild right perihilar prominence. No focal consolidation.  No sizable pleural effusion. No visible pneumothorax.     Osseous structures: No acute osseous finding.     Other: None.       Impression:         Mild right perihilar prominence, vasculature versus lymphadenopathy.     No focal consolidation to suggest pneumonia.           This report was signed and finalized on 9/23/2023 8:12 PM CDT by Sunny Degroot.               I have reviewed the patient's current medications.     Assessment/Plan   Assessment  Active Hospital Problems    Diagnosis     **Acute encephalopathy due to UTI     Polypharmacy     Acute UTI (urinary tract infection)     RAFAL (obstructive sleep apnea)     Chronic constipation     Hypothyroidism (acquired)     Essential  hypertension     Spinal stenosis, lumbar region, without neurogenic claudication     Gastroesophageal reflux disease    Patient is a 83-year-old female who was admitted on 9/23 for an acute mental status change.  Patient was found to have an acute cystitis and she is currently on antibiotics.  Patient is also on significant amount of narcotics.  Review of [facility] showed patient is on 15 mg oxycodone 4 times a day, butalbital/acetaminophen/caffeine twice a day.  Making daily MME significant.    Treatment Plan  Acute encephalopathy is likely multifactorial.  Narcotic medications versus UTI.  CT of the head is negative for any acute process.  Continue ceftriaxone for UTI  Monitor blood and urine culture  Monitor labs  Monitor vitals per hospital protocol  As needed Narcan for respiratory depression  Physical therapy    DVT prophylaxis: Lovenox and SCDs  Medical Decision Making  Number and Complexity of problems: 2 acute problem with high complexity.  High risk medication.  Differential Diagnosis: As above    Conditions and Status        Condition is unchanged.     [facility] Data  External documents reviewed: Epic records  Cardiac tracing (EKG, telemetry) interpretation: EKG reviewed  Radiology interpretation: Imaging reviewed   Labs reviewed: Yes  Any tests that were considered but not ordered: None     Decision rules/scores evaluated (example [id number], Wells, etc): N/A     Discussed with: Patient and nursing staff     Care Planning  Shared decision making: Patient apprised of current labs, vitals, imaging and treatment plan.  They are agreeable with proceeding with plans as discussed.    Code status and discussions:   Code Status and Medical Interventions:   Ordered at: 09/23/23 6678     Code Status (Patient has no pulse and is not breathing):    CPR (Attempt to Resuscitate)     Medical Interventions (Patient has pulse or is breathing):    Full Support         Disposition  Social Determinants of Health that impact  treatment or disposition: May consider discussing about narcotic dependence  I expect the patient to be discharged to SNF in 2-4 days.     Electronically signed by Kaylene Castillo MD, 09/24/23, 13:31 CDT.

## 2023-09-24 NOTE — ED NOTES
The following fall interventions were initiated:    [x] Patient and/or family given education   [x] Call light within reach and educated on how to use   [x] Bed rails up per protocol    [x] Bed locked and in the lowest position   [] Bed alarm set and on loudest setting   [x] Fall wrist band applied   [x] Non skid footwear applied   [] Room free of clutter   [x] Patient items within reach   [x] Adequate lighting provided  [] Falls sign present    [] Patient moved closer to nursing station   [] Restraints applied

## 2023-09-24 NOTE — OUTREACH NOTE
General Surgery Week 3 Survey      Flowsheet Row Responses   [facility] facility patient discharged from? [facility]   Does the patient have one of the following disease processes/diagnoses(primary or secondary)? General Surgery   Week 3 attempt successful? No   Unsuccessful attempts Attempt 1   Revoke Readmitted            Mahnaz MCFARLANE - Registered Nurse

## 2023-09-24 NOTE — H&P
[facility] Medicine Services  HISTORY AND PHYSICAL    Date of Admission: 2023  Primary Care Physician: Olivia Mora APRN    Subjective   Primary Historian: Patient    Chief Complaint: I had a surgery and left me with a sore    History of Present Illness  83 year old female with PMH of HTN, incontinence, RAFAL recently admitted for acute cholecystitis and UTI, status post lap cholecystectomy that is brought back from SNF due to mental status changes. She was somnolent and confused initially per report, now remains a little disoriented to context. She states feels better, but does not really know why she is in the hospital. Complains of pain moving her legs, otherwise no complaints. T max is 99. WBC 5.82. UA with inflammatory changes.     Review of Systems   Otherwise complete ROS reviewed and negative except as mentioned in the HPI.    Past Medical History:   Past Medical History:   Diagnosis Date    Anxiety     Arthritis     Bronchitis     Cancer     uterine    Chronic pain     Depression     Disease of thyroid gland     Fibromyalgia     GERD (gastroesophageal reflux disease)     Headache     History of transfusion     AS     Hyperlipidemia     Hypertension     Incontinence     Insomnia     Leg pain     Lumbar stenosis     Migraines     Peptic ulcer     Restless legs     Sleep apnea     NO C-PAP    UTI (urinary tract infection)     Vaginal bleeding      Past Surgical History:  Past Surgical History:   Procedure Laterality Date    BLADDER REPAIR      MESH HAD TO BE REMOVED IN     BREAST BIOPSY Right 2017    benign    BREAST CYST EXCISION Left     CARDIAC CATHETERIZATION      CARPAL TUNNEL RELEASE      CATARACT EXTRACTION W/ INTRAOCULAR LENS  IMPLANT, BILATERAL      CHOLECYSTECTOMY WITH INTRAOPERATIVE CHOLANGIOGRAM N/A 2023    Procedure: CHOLECYSTECTOMY LAPAROSCOPIC INTRAOPERATIVE CHOLANGIOGRAM;  Surgeon: Gerardo Arciniega MD;  Location: [facility] OR;   Service: General;  Laterality: N/A;    COLONOSCOPY      COLONOSCOPY N/A 10/01/2021    Procedure: COLONOSCOPY WITH ANESTHESIA;  Surgeon: Tom Velasco DO;  Location: [facility] ENDOSCOPY;  Service: Gastroenterology;  Laterality: N/A;  pre: change in bowel habits  post: diverticulosis. hemorrhoids.   Morgan Olivia DonisMORGAN lemon        CYSTECTOMY      D & C HYSTEROSCOPY N/A 11/06/2017    Procedure: DILATATION AND CURETTAGE HYSTEROSCOPY;  Surgeon: Shasta Madrigal MD;  Location: [facility] OR;  Service:     DILATION AND CURETTAGE, DIAGNOSTIC / THERAPEUTIC  2008    ENDOSCOPY  09/23/2010    Short segment of Arriola's,Moderate chroninc esophagogastritis and negative H.pylori    ENDOSCOPY N/A 09/25/2017    Procedure: ESOPHAGOGASTRODUODENOSCOPY WITH ANESTHESIA;  Surgeon: Tom Velasco DO;  Location: [facility] ENDOSCOPY;  Service:     EYE SURGERY      RETINA    HEMORRHOIDECTOMY SIGMOIDOSCOPY N/A 3/21/2023    Procedure: HEMORRHOIDECTOMY WITH EXAM UNDER ANESTHESIA;  Surgeon: Holly Chavez MD;  Location: [facility] OR;  Service: General;  Laterality: N/A;    HYSTERECTOMY  12/20/2017    ORIF TIBIA/FIBULA FRACTURES Left 2000    TRANSVAGINAL TAPING SUSPENSION N/A 11/06/2017    Procedure: VAGINAL MESH REVISION;  Surgeon: Shasta Madrigal MD;  Location: [facility] OR;  Service:     VAGINAL MESH REVISION  2013     Social History:  reports that she has never smoked. She has never used smokeless tobacco. She reports that she does not currently use alcohol. She reports that she does not use drugs.    Family History: family history includes Cancer in her paternal grandmother; Diabetes in her mother and sister; Lung cancer in her paternal grandfather; Lymphoma in her brother; Multiple myeloma in her mother; Ovarian cancer in her paternal aunt; Prostate cancer in her brother; Stroke in her father.       Allergies:  Allergies   Allergen Reactions    Ropinirole Hcl Shortness Of Breath    Codeine Itching and Mental Status Change    Definity  [Perflutren Lipid Microsphere] Other (See Comments)     Severe back pain    Ambien [Zolpidem] Other (See Comments)     HYPER     Eszopiclone Other (See Comments)     MAKES PT HYPER     Pregabalin Dizziness    Tizanidine Other (See Comments)     Terrible nightmares       Medications:  Prior to Admission medications    Medication Sig Start Date End Date Taking? Authorizing Provider   aspirin 81 MG chewable tablet Chew 1 tablet Daily.    Tamiko Gaviria MD   atorvastatin (LIPITOR) 40 MG tablet Take 1 tablet by mouth Daily. 1/16/23   Tamiko Gaviria MD   butalbital-acetaminophen-caffeine (FIORICET, ESGIC) -40 MG per tablet Take 1 tablet by mouth 2 (Two) Times a Day.    Tamiko Gaviria MD   carvedilol (COREG) 12.5 MG tablet Take 1 tablet by mouth 2 (Two) Times a Day With Meals. 8/28/19   Tamiko Gaviria MD   cyclobenzaprine (FLEXERIL) 10 MG tablet Take 1 tablet by mouth 3 (Three) Times a Day As Needed for Muscle Spasms. 11/17/22   Tamiko Gaviria MD   donepezil (ARICEPT) 10 MG tablet Take 1 tablet by mouth Every Night. 10/18/22   Tamiko Gaviria MD   ergocalciferol (ERGOCALCIFEROL) 58376 units capsule Take 1 capsule by mouth 1 (One) Time Per Week. Saturday 4/17/19   Tamiko Gaviria MD   ipratropium (ATROVENT) 0.06 % nasal spray 2 sprays into the nostril(s) as directed by provider 2 (Two) Times a Day. 11/23/22   Tamiko Gaviria MD   LACTASE ENZYME PO Take 3 tablets by mouth As Needed (takes before dairy products).    Tamiko Gaviria MD   lansoprazole (PREVACID) 30 MG capsule Take 1 capsule by mouth Every Morning. 3/19/20   Tamiko Gaviria MD   levothyroxine (SYNTHROID, LEVOTHROID) 50 MCG tablet Take 1 tablet by mouth Every Morning.    Tamiko Gaviria MD   nystatin (MYCOSTATIN) 539931 UNIT/GM cream Apply 1 application  topically to the appropriate area as directed 2 (Two) Times a Day.    Tamiko Gaviria MD   oxyCODONE (ROXICODONE) 15 MG immediate  release tablet Take 1 tablet by mouth 4 (Four) Times a Day. 11/21/22   Tamiko Gaviria MD   polyethylene glycol (MiraLax) 17 g packet Take 17 g by mouth Daily As Needed (Constipation).    Tamiko Gaviria MD   sertraline (ZOLOFT) 25 MG tablet Take 1 tablet by mouth Daily. 2/28/23 2/28/24  Shasta Madrigal MD   solifenacin (VESICARE) 10 MG tablet Take 1 tablet by mouth Daily. 2/27/23 2/28/24  Tamiko Gaviria MD   SUMAtriptan (IMITREX) 50 MG tablet Take 1 tablet by mouth 2 (Two) Times a Day As Needed for Migraine. 7/29/19   Shasta Madrigal MD   venlafaxine (EFFEXOR) 25 MG tablet Take 1 tablet by mouth Daily. 2/28/23   Shasta Madrigal MD     I have utilized all available immediate resources to obtain, update, or review the patient's current medications (including all prescriptions, over-the-counter products, herbals, cannabis/cannabidiol products, and vitamin/mineral/dietary (nutritional) supplements).    Objective     Vital Signs: /49   Pulse 95   Temp 99 °F (37.2 °C) (Oral)   Resp 18   LMP  (LMP Unknown)   SpO2 92%   Physical Exam  Vitals reviewed.   Constitutional:       General: She is not in acute distress.     Appearance: She is well-developed. She is not toxic-appearing.   HENT:      Head: Normocephalic and atraumatic.      Right Ear: External ear normal.      Left Ear: External ear normal.      Mouth/Throat:      Mouth: Mucous membranes are dry.      Pharynx: Oropharynx is clear.   Eyes:      General:         Right eye: No discharge.         Left eye: No discharge.      Extraocular Movements: Extraocular movements intact.      Conjunctiva/sclera: Conjunctivae normal.      Pupils: Pupils are equal, round, and reactive to light.   Neck:      Vascular: No JVD.   Cardiovascular:      Rate and Rhythm: Normal rate and regular rhythm.      Pulses: Normal pulses.      Heart sounds: Normal heart sounds. No murmur heard.    No friction rub. No gallop.   Pulmonary:      Effort: Pulmonary effort  is normal. No respiratory distress.      Breath sounds: No stridor. No wheezing, rhonchi or rales.   Chest:      Chest wall: No tenderness.   Abdominal:      General: Bowel sounds are normal. There is no distension.      Palpations: Abdomen is soft.      Tenderness: There is no abdominal tenderness. There is no guarding or rebound.      Hernia: No hernia is present.   Musculoskeletal:         General: No swelling, tenderness or deformity. Normal range of motion.      Cervical back: Normal range of motion and neck supple. No rigidity or tenderness. No muscular tenderness.      Right lower leg: No edema.      Left lower leg: No edema.   Skin:     General: Skin is warm and dry.      Findings: No erythema or rash.   Neurological:      General: No focal deficit present.      Mental Status: She is alert and oriented to person, place, and time. She is disoriented.      Cranial Nerves: No cranial nerve deficit.      Sensory: No sensory deficit.      Motor: No weakness or abnormal muscle tone.      Deep Tendon Reflexes: Reflexes normal.   Psychiatric:         Mood and Affect: Mood normal.         Behavior: Behavior normal.      Results Reviewed:  Lab Results (last 24 hours)       Procedure Component Value Units Date/Time    Urinalysis, Microscopic Only - Straight Cath [[id number]]  (Abnormal) Collected: 09/23/23 1950    Specimen: Urine from Straight Cath Updated: 09/23/23 2009     RBC, UA 0-2 /HPF      WBC, UA Too Numerous to Count /HPF      Bacteria, UA 4+ /HPF      Squamous Epithelial Cells, UA 0-2 /HPF      Hyaline Casts, UA 0-2 /LPF      Methodology Automated Microscopy    Urinalysis With Culture If Indicated - Straight Cath [[id number]]  (Abnormal) Collected: 09/23/23 1950    Specimen: Urine from Straight Cath Updated: 09/23/23 2009     Color, UA Yellow     Appearance, UA Turbid     pH, UA 6.0     Specific Gravity, UA 1.017     Glucose, UA Negative     Ketones, UA Negative     Bilirubin, UA Negative     Blood, UA Trace      Protein, UA 30 mg/dL (1+)     Leuk Esterase, UA Large (3+)     Nitrite, UA Positive     Urobilinogen, UA 0.2 E.U./dL    Narrative:      In absence of clinical symptoms, the presence of pyuria, bacteria, and/or nitrites on the urinalysis result does not correlate with infection.    Urine Culture - Urine, Straight Cath [[id number]] Collected: 09/23/23 1950    Specimen: Urine from Straight Cath Updated: 09/23/23 2009    Ammonia [[id number]]  (Normal) Collected: 09/23/23 1911    Specimen: Blood Updated: 09/23/23 1949     Ammonia 26 umol/L     Comprehensive Metabolic Panel [[id number]]  (Abnormal) Collected: 09/23/23 1911    Specimen: Blood Updated: 09/23/23 1948     Glucose 106 mg/dL      BUN 23 mg/dL      Creatinine 0.89 mg/dL      Sodium 137 mmol/L      Potassium 3.3 mmol/L      Chloride 102 mmol/L      CO2 24.0 mmol/L      Calcium 9.6 mg/dL      Total Protein 6.6 g/dL      Albumin 3.2 g/dL      ALT (SGPT) 24 U/L      AST (SGOT) 27 U/L      Alkaline Phosphatase 102 U/L      Total Bilirubin 0.3 mg/dL      Globulin 3.4 gm/dL      A/G Ratio 0.9 g/dL      BUN/Creatinine Ratio 25.8     Anion Gap 11.0 mmol/L      eGFR 65.2 mL/min/1.73     Narrative:      GFR Normal >60  Chronic Kidney Disease <60  Kidney Failure <15    The GFR formula is only valid for adults with stable renal function between ages 18 and 70.    Magnesium [[id number]]  (Normal) Collected: 09/23/23 1911    Specimen: Blood Updated: 09/23/23 1948     Magnesium 1.6 mg/dL     Lactic Acid, Plasma [[id number]]  (Normal) Collected: 09/23/23 1911    Specimen: Blood Updated: 09/23/23 1946     Lactate 1.0 mmol/L     BNP [[id number]]  (Normal) Collected: 09/23/23 1911    Specimen: Blood Updated: 09/23/23 1946     proBNP 1,209.0 pg/mL     Narrative:      Among patients with dyspnea, NT-proBNP is highly sensitive for the detection of acute congestive heart failure. In addition NT-proBNP of <300 pg/ml effectively rules out acute congestive heart failure with 99% negative  "predictive value.      D-dimer, Quantitative [[id number]]  (Normal) Collected: 09/23/23 1911    Specimen: Blood Updated: 09/23/23 1940     D-Dimer, Quantitative 0.75 MCGFEU/mL     Narrative:      According to the assay 's published package insert, a normal (<0.50 MCGFEU/mL) D-dimer result in conjunction with a non-high clinical probability assessment, excludes deep vein thrombosis (DVT) and pulmonary embolism (PE) with high sensitivity.    D-dimer values increase with age and this can make VTE exclusion of an older population difficult. To address this, the [employer], based on best available evidence and recent guidelines, recommends that clinicians use age-adjusted D-dimer thresholds in patients greater than 50 years of age with: a) a low probability of PE who do not meet all Pulmonary Embolism Rule Out Criteria, or b) in those with intermediate probability of PE.   The formula for an age-adjusted D-dimer cut-off is \"age/100\".  For example, a 60 year old patient would have an age-adjusted cut-off of 0.60 MCGFEU/mL and an 80 year old 0.80 MCGFEU/mL.    CBC & Differential [[id number]]  (Abnormal) Collected: 09/23/23 1911    Specimen: Blood Updated: 09/23/23 1929    Narrative:      The following orders were created for panel order CBC & Differential.  Procedure                               Abnormality         Status                     ---------                               -----------         ------                     CBC Auto Differential[[id number]]        Abnormal            Final result                 Please view results for these tests on the individual orders.    CBC Auto Differential [[id number]]  (Abnormal) Collected: 09/23/23 1911    Specimen: Blood Updated: 09/23/23 1929     WBC 5.82 10*3/mm3      RBC 3.08 10*6/mm3      Hemoglobin 9.5 g/dL      Hematocrit 30.4 %      MCV 98.7 fL      MCH 30.8 pg      MCHC 31.3 g/dL      RDW 15.2 %      RDW-SD 54.5 fl      MPV 10.6 fL      " Platelets 229 10*3/mm3      Neutrophil % 60.4 %      Lymphocyte % 17.9 %      Monocyte % 14.3 %      Eosinophil % 6.5 %      Basophil % 0.7 %      Immature Grans % 0.2 %      Neutrophils, Absolute 3.52 10*3/mm3      Lymphocytes, Absolute 1.04 10*3/mm3      Monocytes, Absolute 0.83 10*3/mm3      Eosinophils, Absolute 0.38 10*3/mm3      Basophils, Absolute 0.04 10*3/mm3      Immature Grans, Absolute 0.01 10*3/mm3      nRBC 0.0 /100 WBC     Blood Culture - Blood, Arm, Left [[id number]] Collected: 09/23/23 1905    Specimen: Blood from Arm, Left Updated: 09/23/23 1928    Blood Culture - Blood, Arm, Right [[id number]] Collected: 09/23/23 1911    Specimen: Blood from Arm, Right Updated: 09/23/23 1928          Imaging Results (Last 24 Hours)       Procedure Component Value Units Date/Time    CT Head Without Contrast [[id number]] Collected: 09/23/23 2027     Updated: 09/23/23 2036    Narrative:      Exam: CT HEAD WO CONTRAST- 9/23/2023 8:11 PM CDT     HISTORY: Mental status change, unknown cause       DOSE LENGTH PRODUCT: 648 mGy cm. Automated exposure control was also  utilized to decrease patient radiation dose.     Technique:   Helically acquired CT of the brain without IV contrast was performed.  Sagittal and coronal reformations are also provided for review. Soft  tissue and bone kernels are available for interpretation.     Comparison: 9/4/2023.     Findings:      Ventricles and extra-axial CSF spaces are normal in size.     No intraparenchymal or extra-axial hemorrhage.     Moderate periventricular white matter low-attenuation is unchanged. No  new attenuation abnormality.     Status post cataracts. Opacification of the right maxillary sinus with  increased density and reactive chronic wall thickening. Mild left  maxillary mucosal thickening. Mastoid air cells are grossly clear.     No suspicious calvarial or extracranial soft tissue abnormality.     Other:None.       Impression:      Impression:       No acute  intracranial abnormality.     Stable chronic small vessel ischemic changes.     Chronic right maxillary sinus disease with increased density, suggestive  of inspissated secretions or chronic allergic fungal sinusitis.     This report was signed and finalized on 9/23/2023 8:33 PM CDT by Sunny Degroot.       XR Chest 1 View [[id number]] Collected: 09/23/23 2010     Updated: 09/23/23 2015    Narrative:      EXAM: XR CHEST 1 VW- 9/23/2023 7:02 PM CDT     HISTORY: weakness, altered mental status.     COMPARISON: 9/4/2023.     TECHNIQUE: Single frontal radiograph of the chest was obtained.     FINDINGS:      Support Devices: None.     Cardiac and Mediastinal Silhouettes: Normal.     Lungs/Pleura: Mild right perihilar prominence. No focal consolidation.  No sizable pleural effusion. No visible pneumothorax.     Osseous structures: No acute osseous finding.     Other: None.       Impression:         Mild right perihilar prominence, vasculature versus lymphadenopathy.     No focal consolidation to suggest pneumonia.           This report was signed and finalized on 9/23/2023 8:12 PM CDT by Sunny Degroot.             I have personally reviewed and interpreted the radiology studies and ECG obtained at time of admission.     Assessment / Plan   Assessment:   Active Hospital Problems    Diagnosis     **Acute encephalopathy due to UTI     Acute UTI (urinary tract infection)     RAFAL (obstructive sleep apnea)     Chronic constipation     Hypothyroidism (acquired)     Essential hypertension     Spinal stenosis, lumbar region, without neurogenic claudication     Gastroesophageal reflux disease      Treatment Plan  The patient will be admitted to my service here at [facility].   Admit to medical floor   Vitals every 4 hours  Cardiac diet  IVF NS 75 cc/hour  Up with assistance and fall precautions    Continue Rocephin  Follow cultures    Home medications reviewed  Oxycodone home dose continued, consider reducing dose for  excessive somnolence or worsening confusion    DVT prophylaxis > Lovenox 40 mg SQ daily    Medical Decision Making  Number and Complexity of problems: 3 complex medical problems  Differential Diagnosis: None    Conditions and Status        Condition is improving.     [facility] Data  External documents reviewed: [employer] EHR  Cardiac tracing (EKG, telemetry) interpretation: -  Radiology interpretation: see above  Labs reviewed: see above  Any tests that were considered but not ordered: none     Decision rules/scores evaluated (example [id number], Wells, etc): N/A     Discussed with: Patient     Care Planning  Shared decision making: Patient  Code status and discussions: Full    Disposition  Social Determinants of Health that impact treatment or disposition: none  Estimated length of stay is to be determined.     I confirmed that the patient's advanced care plan is present, code status is documented, and a surrogate decision maker is listed in the patient's medical record.     The patient's surrogate decision maker is family, see records.     The patient was seen and examined by me on 9/23/2023 at 2109.    Electronically signed by Claudio Kauffman MD, 09/23/23, 21:09 CDT.

## 2023-09-24 NOTE — PLAN OF CARE
Goal Outcome Evaluation:                 Patient alert and answers all orientation question. Complains of mild pain at abdomen and hip. Room air. Vitals stable. Lovenox for VTE. Reduced oral intake. Encouraged to eat and drink. IVF continue as ordered. Incontinent to bowel and bladder. Safety precautions maintained. Will continue to monitor.

## 2023-09-25 ENCOUNTER — READMISSION MANAGEMENT (OUTPATIENT)
Dept: CALL CENTER | Facility: HOSPITAL | Age: 83
End: 2023-09-25

## 2023-09-25 VITALS
BODY MASS INDEX: 26.86 KG/M2 | TEMPERATURE: 98.2 F | WEIGHT: 171.13 LBS | HEIGHT: 67 IN | RESPIRATION RATE: 18 BRPM | DIASTOLIC BLOOD PRESSURE: 61 MMHG | SYSTOLIC BLOOD PRESSURE: 152 MMHG | HEART RATE: 98 BPM | OXYGEN SATURATION: 95 %

## 2023-09-25 PROBLEM — R41.82 ALTERED MENTAL STATUS: Status: ACTIVE | Noted: 2023-09-25

## 2023-09-25 LAB
ANION GAP SERPL CALCULATED.3IONS-SCNC: 7 MMOL/L (ref 5–15)
BACTERIA SPEC AEROBE CULT: ABNORMAL
BASOPHILS # BLD AUTO: 0.01 10*3/MM3 (ref 0–0.2)
BASOPHILS NFR BLD AUTO: 0.2 % (ref 0–1.5)
BUN SERPL-MCNC: 10 MG/DL (ref 8–23)
BUN/CREAT SERPL: 16.1 (ref 7–25)
CALCIUM SPEC-SCNC: 9.3 MG/DL (ref 8.6–10.5)
CHLORIDE SERPL-SCNC: 107 MMOL/L (ref 98–107)
CO2 SERPL-SCNC: 27 MMOL/L (ref 22–29)
CREAT SERPL-MCNC: 0.62 MG/DL (ref 0.57–1)
DEPRECATED RDW RBC AUTO: 54.5 FL (ref 37–54)
EGFRCR SERPLBLD CKD-EPI 2021: 89.6 ML/MIN/1.73
EOSINOPHIL # BLD AUTO: 0.27 10*3/MM3 (ref 0–0.4)
EOSINOPHIL NFR BLD AUTO: 6.2 % (ref 0.3–6.2)
ERYTHROCYTE [DISTWIDTH] IN BLOOD BY AUTOMATED COUNT: 14.7 % (ref 12.3–15.4)
GLUCOSE SERPL-MCNC: 97 MG/DL (ref 65–99)
HCT VFR BLD AUTO: 26.3 % (ref 34–46.6)
HGB BLD-MCNC: 7.9 G/DL (ref 12–15.9)
IMM GRANULOCYTES # BLD AUTO: 0.02 10*3/MM3 (ref 0–0.05)
IMM GRANULOCYTES NFR BLD AUTO: 0.5 % (ref 0–0.5)
LYMPHOCYTES # BLD AUTO: 0.97 10*3/MM3 (ref 0.7–3.1)
LYMPHOCYTES NFR BLD AUTO: 22.2 % (ref 19.6–45.3)
MCH RBC QN AUTO: 30.3 PG (ref 26.6–33)
MCHC RBC AUTO-ENTMCNC: 30 G/DL (ref 31.5–35.7)
MCV RBC AUTO: 100.8 FL (ref 79–97)
MONOCYTES # BLD AUTO: 0.58 10*3/MM3 (ref 0.1–0.9)
MONOCYTES NFR BLD AUTO: 13.3 % (ref 5–12)
NEUTROPHILS NFR BLD AUTO: 2.51 10*3/MM3 (ref 1.7–7)
NEUTROPHILS NFR BLD AUTO: 57.6 % (ref 42.7–76)
NRBC BLD AUTO-RTO: 0 /100 WBC (ref 0–0.2)
PLATELET # BLD AUTO: 169 10*3/MM3 (ref 140–450)
PMV BLD AUTO: 11 FL (ref 6–12)
POTASSIUM SERPL-SCNC: 4.3 MMOL/L (ref 3.5–5.2)
RBC # BLD AUTO: 2.61 10*6/MM3 (ref 3.77–5.28)
SODIUM SERPL-SCNC: 141 MMOL/L (ref 136–145)
WBC NRBC COR # BLD: 4.36 10*3/MM3 (ref 3.4–10.8)

## 2023-09-25 PROCEDURE — A9270 NON-COVERED ITEM OR SERVICE: HCPCS | Performed by: FAMILY MEDICINE

## 2023-09-25 PROCEDURE — 63710000001 SERTRALINE 25 MG TABLET: Performed by: FAMILY MEDICINE

## 2023-09-25 PROCEDURE — 80048 BASIC METABOLIC PNL TOTAL CA: CPT | Performed by: FAMILY MEDICINE

## 2023-09-25 PROCEDURE — 63710000001 OXYBUTYNIN XL 5 MG TABLET SUSTAINED-RELEASE 24 HOUR: Performed by: FAMILY MEDICINE

## 2023-09-25 PROCEDURE — 63710000001 VENLAFAXINE 50 MG TABLET: Performed by: FAMILY MEDICINE

## 2023-09-25 PROCEDURE — 63710000001 CARVEDILOL 6.25 MG TABLET: Performed by: FAMILY MEDICINE

## 2023-09-25 PROCEDURE — 25010000002 ENOXAPARIN PER 10 MG: Performed by: FAMILY MEDICINE

## 2023-09-25 PROCEDURE — 63710000001 ASPIRIN 81 MG CHEWABLE TABLET: Performed by: FAMILY MEDICINE

## 2023-09-25 PROCEDURE — G0378 HOSPITAL OBSERVATION PER HR: HCPCS

## 2023-09-25 PROCEDURE — 63710000001 ATORVASTATIN 40 MG TABLET: Performed by: FAMILY MEDICINE

## 2023-09-25 PROCEDURE — 96372 THER/PROPH/DIAG INJ SC/IM: CPT

## 2023-09-25 PROCEDURE — 63710000001 ACETAMINOPHEN 325 MG TABLET: Performed by: FAMILY MEDICINE

## 2023-09-25 PROCEDURE — 85025 COMPLETE CBC W/AUTO DIFF WBC: CPT | Performed by: FAMILY MEDICINE

## 2023-09-25 RX ORDER — BUTALBITAL, ACETAMINOPHEN AND CAFFEINE 50; 325; 40 MG/1; MG/1; MG/1
1 TABLET ORAL 2 TIMES DAILY PRN
Start: 2023-09-25

## 2023-09-25 RX ORDER — OXYCODONE HYDROCHLORIDE 15 MG/1
15 TABLET ORAL EVERY 12 HOURS PRN
Refills: 0
Start: 2023-09-25

## 2023-09-25 RX ORDER — ALPRAZOLAM 1 MG/1
1 TABLET ORAL 3 TIMES DAILY PRN
COMMUNITY
End: 2023-09-25 | Stop reason: HOSPADM

## 2023-09-25 RX ORDER — CEFDINIR 300 MG/1
300 CAPSULE ORAL 2 TIMES DAILY
Qty: 10 CAPSULE | Refills: 0 | Status: SHIPPED | OUTPATIENT
Start: 2023-09-25 | End: 2023-09-30

## 2023-09-25 RX ADMIN — OXYCODONE HYDROCHLORIDE 15 MG: 5 TABLET ORAL at 01:27

## 2023-09-25 RX ADMIN — CYCLOBENZAPRINE 10 MG: 10 TABLET, FILM COATED ORAL at 01:28

## 2023-09-25 RX ADMIN — OXYBUTYNIN CHLORIDE 10 MG: 5 TABLET, EXTENDED RELEASE ORAL at 09:55

## 2023-09-25 RX ADMIN — LEVOTHYROXINE SODIUM 50 MCG: 50 TABLET ORAL at 06:15

## 2023-09-25 RX ADMIN — ACETAMINOPHEN 650 MG: 325 TABLET, FILM COATED ORAL at 02:27

## 2023-09-25 RX ADMIN — ENOXAPARIN SODIUM 40 MG: 100 INJECTION SUBCUTANEOUS at 09:55

## 2023-09-25 RX ADMIN — Medication 10 ML: at 09:55

## 2023-09-25 RX ADMIN — ATORVASTATIN CALCIUM 40 MG: 40 TABLET, FILM COATED ORAL at 09:55

## 2023-09-25 RX ADMIN — CARVEDILOL 12.5 MG: 6.25 TABLET, FILM COATED ORAL at 09:54

## 2023-09-25 RX ADMIN — VENLAFAXINE 25 MG: 50 TABLET ORAL at 09:55

## 2023-09-25 RX ADMIN — SERTRALINE HYDROCHLORIDE 25 MG: 25 TABLET ORAL at 09:55

## 2023-09-25 RX ADMIN — PANTOPRAZOLE SODIUM 40 MG: 40 TABLET, DELAYED RELEASE ORAL at 06:15

## 2023-09-25 RX ADMIN — ACETAMINOPHEN 650 MG: 325 TABLET, FILM COATED ORAL at 14:01

## 2023-09-25 RX ADMIN — ASPIRIN 81 MG: 81 TABLET, CHEWABLE ORAL at 09:56

## 2023-09-25 NOTE — PLAN OF CARE
Goal Outcome Evaluation:                   Pt alert and oriented 4. VSS. No c/o pain. WHITESIDE. Pt up x2 with gait belt/walker. PPP. SCDs. Scds/lovenox for VTE. . RA. Incontinent. Last BM yesterday. Reg Diet. Pt reminded to turn and reposition q2/ Pt repositions independently. PT refused this am by pt. Plans to d/c today. Call light within reach. Safety maintained.

## 2023-09-25 NOTE — PLAN OF CARE
Goal Outcome Evaluation:    A/O x 3, VSS. Slightly more alert today. PRNs given per orders for pain x 2. No new complaints.

## 2023-09-25 NOTE — DISCHARGE SUMMARY
[facility]  DISCHARGE SUMMARY       Date of Admission: 9/23/2023  Date of Discharge:  9/25/2023  Primary Care Physician: Olivia Mora APRN    Presenting Problem/History of Present Illness:  Altered mental status change    Final Discharge Diagnoses:  Active Hospital Problems    Diagnosis     **Acute encephalopathy due to UTI     Altered mental status     Polypharmacy     Acute UTI (urinary tract infection)     RAFAL (obstructive sleep apnea)     Chronic constipation     Hypothyroidism (acquired)     Essential hypertension     Spinal stenosis, lumbar region, without neurogenic claudication     Gastroesophageal reflux disease        Consults: None.    Procedures Performed: None.    Pertinent Test Results:   Results for orders placed during the hospital encounter of 09/04/23    Adult Transthoracic Echo Complete W/ Cont if Necessary Per Protocol    Interpretation Summary    Left ventricular systolic function is normal. Left ventricular ejection fraction appears to be 56 - 60%.    Left ventricular wall thickness is consistent with moderate concentric hypertrophy.    Left ventricular diastolic function is consistent with (grade Ia w/high LAP) impaired relaxation.    The left atrial cavity is moderate to severely dilated.    Left atrial volume is moderately increased.    The right atrial cavity is borderline dilated.    Estimated right ventricular systolic pressure from tricuspid regurgitation is mildly elevated (35-45 mmHg).      Imaging Results (All)       Procedure Component Value Units Date/Time    CT Head Without Contrast [[id number]] Collected: 09/23/23 2027     Updated: 09/23/23 2036    Narrative:      Exam: CT HEAD WO CONTRAST- 9/23/2023 8:11 PM CDT     HISTORY: Mental status change, unknown cause       DOSE LENGTH PRODUCT: 648 mGy cm. Automated exposure control was also  utilized to decrease patient radiation dose.     Technique:   Helically acquired CT  of the brain without IV contrast was performed.  Sagittal and coronal reformations are also provided for review. Soft  tissue and bone kernels are available for interpretation.     Comparison: 9/4/2023.     Findings:      Ventricles and extra-axial CSF spaces are normal in size.     No intraparenchymal or extra-axial hemorrhage.     Moderate periventricular white matter low-attenuation is unchanged. No  new attenuation abnormality.     Status post cataracts. Opacification of the right maxillary sinus with  increased density and reactive chronic wall thickening. Mild left  maxillary mucosal thickening. Mastoid air cells are grossly clear.     No suspicious calvarial or extracranial soft tissue abnormality.     Other:None.       Impression:      Impression:       No acute intracranial abnormality.     Stable chronic small vessel ischemic changes.     Chronic right maxillary sinus disease with increased density, suggestive  of inspissated secretions or chronic allergic fungal sinusitis.     This report was signed and finalized on 9/23/2023 8:33 PM CDT by Sunny Degroot.       XR Chest 1 View [[id number]] Collected: 09/23/23 2010     Updated: 09/23/23 2015    Narrative:      EXAM: XR CHEST 1 VW- 9/23/2023 7:02 PM CDT     HISTORY: weakness, altered mental status.     COMPARISON: 9/4/2023.     TECHNIQUE: Single frontal radiograph of the chest was obtained.     FINDINGS:      Support Devices: None.     Cardiac and Mediastinal Silhouettes: Normal.     Lungs/Pleura: Mild right perihilar prominence. No focal consolidation.  No sizable pleural effusion. No visible pneumothorax.     Osseous structures: No acute osseous finding.     Other: None.       Impression:         Mild right perihilar prominence, vasculature versus lymphadenopathy.     No focal consolidation to suggest pneumonia.           This report was signed and finalized on 9/23/2023 8:12 PM CDT by Sunny Degroot.             LAB RESULTS:      Lab 09/25/23  0536  09/24/23  0904 09/23/23 1911 09/19/23  1805   WBC 4.36 3.73 5.82 7.8   HEMOGLOBIN 7.9* 8.4* 9.5* 10.1*   HEMATOCRIT 26.3* 27.9* 30.4* 31.7*   PLATELETS 169 177 229 328   NEUTROS ABS 2.51 1.85 3.52 5.9   IMMATURE GRANS (ABS) 0.02 0.01 0.01 0.0   LYMPHS ABS 0.97 1.03 1.04 1.1   MONOS ABS 0.58 0.60 0.83 0.50   EOS ABS 0.27 0.22 0.38 0.20   .8* 101.8* 98.7* 97.8   LACTATE  --   --  1.0  --    D DIMER QUANT  --   --  0.75  --          Lab 09/25/23  0536 09/24/23  0904 09/23/23 1911   SODIUM 141 142 137   POTASSIUM 4.3 4.3 3.3*   CHLORIDE 107 108* 102   CO2 27.0 26.0 24.0   ANION GAP 7.0 8.0 11.0   BUN 10 14 23   CREATININE 0.62 0.67 0.89   EGFR 89.6 87.9 65.2   GLUCOSE 97 97 106*   CALCIUM 9.3 8.8 9.6   MAGNESIUM  --   --  1.6         Lab 09/23/23 1911   TOTAL PROTEIN 6.6   ALBUMIN 3.2*   GLOBULIN 3.4   ALT (SGPT) 24   AST (SGOT) 27   BILIRUBIN 0.3   ALK PHOS 102         Lab 09/23/23 1911   PROBNP 1,209.0                 Brief Urine Lab Results  (Last result in the past 365 days)        Color   Clarity   Blood   Leuk Est   Nitrite   Protein   CREAT   Urine HCG        09/23/23 1950 Yellow   Turbid   Trace   Large (3+)   Positive   30 mg/dL (1+)                 Microbiology Results (last 10 days)       Procedure Component Value - Date/Time    Urine Culture - Urine, Straight Cath [[id number]]  (Abnormal)  (Susceptibility) Collected: 09/23/23 1950    Lab Status: Final result Specimen: Urine from Straight Cath Updated: 09/25/23 0955     Urine Culture >100,000 CFU/mL Escherichia coli    Narrative:      Colonization of the urinary tract without infection is common. Treatment is discouraged unless the patient is symptomatic, pregnant, or undergoing an invasive urologic procedure.    Susceptibility        Escherichia coli      CHULA      Ampicillin Susceptible      Ampicillin + Sulbactam Susceptible      Cefazolin Susceptible      Cefepime Susceptible      Ceftazidime Susceptible      Ceftriaxone Susceptible       Gentamicin Susceptible      Levofloxacin Susceptible      Nitrofurantoin Susceptible      Piperacillin + Tazobactam Susceptible      Trimethoprim + Sulfamethoxazole Susceptible                           Blood Culture - Blood, Arm, Right [[id number]]  (Normal) Collected: 09/23/23 1911    Lab Status: Preliminary result Specimen: Blood from Arm, Right Updated: 09/24/23 1930     Blood Culture No growth at 24 hours    Blood Culture - Blood, Arm, Left [[id number]]  (Normal) Collected: 09/23/23 1905    Lab Status: Preliminary result Specimen: Blood from Arm, Left Updated: 09/24/23 1930     Blood Culture No growth at 24 hours            Hospital Course:   Ms. Gomes is an 83-year-old female who presented to [facility] on 9/23 for altered mental status change.  She was recently admitted on 9/4 to 9/10/2023 under the service of Dr. Kent for E. coli urinary tract infection and acute cholecystitis.  She underwent laparoscopic cholecystectomy and intraoperative cholangiogram on 9/7.  She completed treatment with Rocephin during hospitalization for E. coli UTI.  She was discharged on Flagyl for acute colitis.  She was discharged home in stable condition.  She was seen in follow-up by her primary care provider on 9/14.  Daughter Ivonne provided history of present illness.  Per daughter, she reportedly was confused and not responding as well.  No reported fever or chills.  Daughter states that she has had some diarrhea since surgery.  In the ED, WBC and lactate normal.  No fever.  Urinalysis showed trace blood, positive nitrate, large leukocyte, TNTC WBC and 4+ bacteria.  She was started on IV ceftriaxone.  CT head interpreted by radiology showed no acute findings.  Chest x-ray interpreted by radiology showed no acute findings.    Urinalysis showed trace blood, positive nitrate, large leukocyte, TNTC WBC and 4+ bacteria.  She was started on IV ceftriaxone.  WBC normal.  No fever.  Blood culture with no growth to date.   "Urine culture shows pan susceptible E. coli.  We will transition ceftriaxone to Omnicef to complete a total of 7 days antibiotic therapy.    Patient has chronic pain syndrome with chronic prescription for opioids followed by Dr. Garcia.  Patient states she takes oxycodone 15 mg twice daily as needed.    Patient states she is feeling better today.  She very much wants to go home.  PT and OT consulted.  However, patient refused to work with therapy this morning.  She wants to discharge home with her daughter as previous. According to daughter, Ivonne patient only takes a very limited amount of steps using a walker in the home.  She has all needed DME.  She has reached maximum benefit of hospitalization.  She is medically stable and appropriate for discharge today.    Physical Exam on Discharge:  /61 (BP Location: Right arm, Patient Position: Lying)   Pulse 98   Temp 98.2 °F (36.8 °C) (Oral)   Resp 18   Ht 170 cm (66.93\")   Wt 77.6 kg (171 lb 2 oz)   LMP  (LMP Unknown)   SpO2 95%   BMI 26.86 kg/m²   Physical Exam  Vitals reviewed.   Constitutional:       General: She is not in acute distress.     Appearance: She is not toxic-appearing.      Comments: Lying in bed.  No acute distress.  On room air.  No family at bedside.  Discussed with her nurse April.   HENT:      Head: Normocephalic and atraumatic.      Mouth/Throat:      Mouth: Mucous membranes are moist.      Pharynx: Oropharynx is clear.   Eyes:      Extraocular Movements: Extraocular movements intact.      Conjunctiva/sclera: Conjunctivae normal.      Pupils: Pupils are equal, round, and reactive to light.   Cardiovascular:      Rate and Rhythm: Normal rate and regular rhythm.      Pulses: Normal pulses.   Pulmonary:      Effort: Pulmonary effort is normal. No respiratory distress.      Breath sounds: Normal breath sounds.   Abdominal:      General: Bowel sounds are normal. There is no distension.      Palpations: Abdomen is soft.      Tenderness: " There is no abdominal tenderness.   Musculoskeletal:         General: No swelling or tenderness. Normal range of motion.      Cervical back: Normal range of motion and neck supple. No muscular tenderness.   Skin:     General: Skin is warm and dry.      Findings: No erythema or rash.   Neurological:      General: No focal deficit present.      Mental Status: She is alert and oriented to person, place, and time.      Cranial Nerves: No cranial nerve deficit.      Motor: No weakness.   Psychiatric:         Mood and Affect: Mood normal.         Behavior: Behavior normal.     Condition on Discharge: medically stable.    Discharge Disposition:  Home or Self Care    Discharge Medications:     Discharge Medications        New Medications        Instructions Start Date   cefdinir 300 MG capsule  Commonly known as: OMNICEF   300 mg, Oral, 2 Times Daily             Changes to Medications        Instructions Start Date   butalbital-acetaminophen-caffeine -40 MG per tablet  Commonly known as: FIORICET, ESGIC  What changed:   when to take this  reasons to take this   1 tablet, Oral, 2 Times Daily PRN      oxyCODONE 15 MG immediate release tablet  Commonly known as: ROXICODONE  What changed:   when to take this  reasons to take this   15 mg, Oral, Every 12 Hours PRN             Continue These Medications        Instructions Start Date   aspirin 81 MG chewable tablet   81 mg, Oral, Daily      atorvastatin 40 MG tablet  Commonly known as: LIPITOR   1 tablet, Oral, Daily      carvedilol 12.5 MG tablet  Commonly known as: COREG   12.5 mg, Oral, 2 Times Daily With Meals      cyclobenzaprine 10 MG tablet  Commonly known as: FLEXERIL   10 mg, Oral, 3 Times Daily PRN      donepezil 10 MG tablet  Commonly known as: ARICEPT   10 mg, Oral, Nightly      ergocalciferol 1.25 MG (37134 UT) capsule  Commonly known as: ERGOCALCIFEROL  Notes to patient: As per home schedule   50,000 Units, Oral, Weekly, Saturday      ipratropium 0.06 % nasal  spray  Commonly known as: ATROVENT   2 sprays, Nasal, 2 Times Daily      LACTASE ENZYME PO   3 tablets, Oral, As Needed      lansoprazole 30 MG capsule  Commonly known as: PREVACID   30 mg, Oral, Every Morning      levothyroxine 50 MCG tablet  Commonly known as: SYNTHROID, LEVOTHROID   50 mcg, Oral, Every Morning      polyethylene glycol 17 g packet  Commonly known as: MIRALAX   17 g, Oral, Daily PRN      sertraline 25 MG tablet  Commonly known as: ZOLOFT   25 mg, Oral, Daily      solifenacin 10 MG tablet  Commonly known as: VESICARE   1 tablet, Oral, Daily      SUMAtriptan 50 MG tablet  Commonly known as: IMITREX   50 mg, Oral, 2 Times Daily PRN      venlafaxine 25 MG tablet  Commonly known as: EFFEXOR   25 mg, Oral, Daily             Stop These Medications      ALPRAZolam 1 MG tablet  Commonly known as: XANAX     nystatin 280817 UNIT/GM cream  Commonly known as: MYCOSTATIN            Discharge Diet:   Diet Instructions       Diet: Regular/House Diet; Regular Texture (IDDSI 7); Thin (IDDSI 0)      Discharge Diet: Regular/House Diet    Texture: Regular Texture (IDDSI 7)    Fluid Consistency: Thin (IDDSI 0)            Activity at Discharge:   Activity Instructions       Activity as Tolerated              Follow-up Appointments:   1.  Follow-up with MORGAN Suarez in 1 week.    Test Results Pending at Discharge: none.    Electronically signed by MORGAN Ho, 09/25/23, 12:00 CDT.    Time: 35 minutes.

## 2023-09-25 NOTE — PAYOR COMM NOTE
"  ATTENTION [facility].    [facility] [phone]  [phone]      THIS PATIENT WAS ADMITTED ON 9/23/23. INPATIENT ORDER.    9/25/23. MD CHANGED PATIENT TO OBSERVATION STATUS.  NO PRE-CERT FOR OBSERVATION.............................................    FAXING OBSERVATION ORDER..............................              Jennifer Soliman (83 y.o. Female)       Date of Birth   1940    Social Security Number       Address   [address] [facility] [address]    Home Phone   [phone]    MRN   [id number]       Lutheran   Jew    Marital Status                               Admission Date   9/23/23    Admission Type   Emergency    Admitting Provider   Cory Triado MD    Attending Provider   Cory Tirado MD    Department, Room/Bed   [facility] 3A, 354/1       Discharge Date       Discharge Disposition   Home or Self Care    Discharge Destination                                 Attending Provider: Cory Tirado MD    Allergies: Ropinirole Hcl, Codeine, Definity [Perflutren Lipid Microsphere], Ambien [Zolpidem], Eszopiclone, Pregabalin, Tizanidine    Isolation: None   Infection: None   Code Status: CPR    Ht: 170 cm (66.93\")   Wt: 77.6 kg (171 lb 2 oz)    Admission Cmt: None   Principal Problem: Acute encephalopathy due to UTI [G93.40]                   Active Insurance as of 9/23/2023       Primary Coverage       Payor Plan Insurance Group Employer/Plan Group    [facility] MEDICARE REPLACEMENT [facility] MEDICARE REPLACEMENT [id number]       Payor Plan Address Payor Plan Phone Number Payor Plan Fax Number Effective Dates    [address] [address]   1/1/2017 - None Entered    [facility] [id number]         Subscriber Name Subscriber Birth Date Member ID       JENNIFER SOLIMAN 1940 [id number]                     Emergency Contacts        (Rel.) Home Phone Work Phone Mobile Phone    SolimanAdams harry (Spouse) [phone] -- [phone]    Ivonne Liang (Daughter) " [phone] -- --             [facility] Encounter Date/Time: 2023 [facility]   Hospital Account: [id number]    MRN: [id number]   Patient:  Jennifer Soliman   Contact Serial #: [id number]   SSN:          ENCOUNTER             Patient Class: Observation   Unit: [address] Service: Medicine     Bed: 354/1   Admitting Provider: Cory Tirado MD   Referring Physician: Provider, No Known   Attending Provider: Cory Tirado MD   Adm Diagnosis: Altered mental status [R*               PATIENT          Name: Jennifer Soliman : 1940 (83 yrs)   Address: [facility] [address] Sex: Female   City: [place] [address]   County: [place]   Marital Status:  Ethnicity: NOT                                                                              Race: WHITE   Primary Care Provider: Olivia Mora AP* Patients Phone: Home Phone: [phone]     Mobile Phone: [phone]   EMERGENCY CONTACT   Contact Name Legal Guardian? Relationship to Patient Home Phone Work Phone   1. Adams Soliman  2. Ivonne Liang  No Spouse  Daughter [phone]           GUARANTOR            Guarantor: Jennifer Soliman     : 1940   Address: [facility] [address] Sex: [place] [address]     Relation to Patient: Self       Home Phone: [phone]   Guarantor ID: [id number]       Work Phone:     GUARANTOR EMPLOYER   Employer:           Status: RETIRED   COVERAGE          PRIMARY INSURANCE   Payor: [employer] MEDICARE REPLACEMENT Plan: [employer] MEDICARE REPLACEMENT   Group Number: [id number] Insurance Type: INDEMNITY   Subscriber Name: JENNIFER SOLIMAN Subscriber : 1940   Subscriber ID: [id number] Coverage Address:  [address] [address]  [place] [address]   Pat. Rel. to Subscriber: Self Coverage Phone:     SECONDARY INSURANCE   Payor: N/A Plan: N/A   Group Number:   Insurance Type:     Subscriber Name:   Subscriber :     Subscriber ID:   Coverage Address:     Pat.  Rel. to Subscriber:   Coverage Phone:        Contact Serial # [id number])         September 25, 2023    Chart ID ([id number])                   Initiate Observation Status [ADT12] (Order [id number])  Order  Date: 9/25/2023 Department: [address] Released By: Aleshia Lira LPN (auto-released) Authorizing: Dwight Baxter MD     Order History  Inpatient  Date/Time Action Taken User Additional Information   09/25/23 0936 Release Aleshia Lira LPN (auto-released) From Order:[id number]   09/25/23 0936 Complete Aleshia Lira LPN      Order Details    Frequency Duration Priority Order Class   Once 1  occurrence Routine Hospital Performed     Start Date/Time    Start Date Start Time   09/25/23 0935     Order Information    Order Date Service Start Date Start Time End Date   09/25/23 Medicine 09/25/23 0935 09/25/23     Comments    Sent to Physician Advisor. Only meeting observation at this time.            Reference Links    Associated Reports   View Encounter     Order Questions    Question Answer   Level of Care Med/Surg   Diagnosis Altered mental status   Admitting Physician DWIGHT BAXTER   Attending Physician DWIGHT BAXTER                    Cosign Order Info    Action Created on Order Mode Entered by Responsible Provider Signed by Signed on   Ordering 09/25/23 0936 Telephone with readback Aleshia Lira LPN Ebenibo, Sotonte E, MD         Completion Info    User Date/Time   Aleshia Lira LPN 09/25/23 0936                             Acknowledgement Info    For At Acknowledged By Acknowledged On   Placing Order 09/25/23 0935 Leonor Lucio RN 09/25/23 1021           Reprint Order Requisition    Initiate Observation Status (Order #[id number]) on 9/25/23         Encounter    View Encounter                Order Provider Info        Office phone Pager E-mail   Ordering User Aleshia Lira LPN -- -- --   Authorizing Provider Dwight Baxter MD  [phone] -- --   Attending Provider Cory Tirado [phone] -- --     Tracking Reports    Cosign Tracking Order Transmittal Tracking     Authorized by:  Cory Tirado MD  (NPI: [id number])                Lab Component SmartPhrase Guide    Initiate Observation Status (Order #[id number]) on 9/25/23 9/25/23 CHANGED TO OBSERVATION...................  FAXING OBSERVATION ORDER.

## 2023-09-26 ENCOUNTER — TELEPHONE (OUTPATIENT)
Dept: INTERNAL MEDICINE | Age: 83
End: 2023-09-26

## 2023-09-26 NOTE — TELEPHONE ENCOUNTER
[address] [facility] Transitions Initial Follow Up Call    Outreach made within 2 business days of discharge: Yes    Patient: Rickie Deshpande   Patient : 1940 MRN: [id number]    Reason for Admission: acute mental status change    Discharge Date: 2023      Discharge Diagnosis:   **Acute encephalopathy due to UTI   Polypharmacy   Acute UTI (urinary tract infection)   TESS (obstructive sleep apnea)   Chronic constipation   Hypothyroidism (acquired)   Essential hypertension   Spinal stenosis, lumbar region, without neurogenic claudication   Gastroesophageal reflux disease   Patient is a 46-year-old female who was admitted on  for an acute mental status change. Patient was found to have an acute cystitis and she is currently on antibiotics. Patient is also on significant amount of narcotics. [facility] showed patient is on 15 mg oxycodone 4 times a day, butalbital/acetaminophen/caffeine twice a day. Making daily MME significant. Spoke with: Attempted to make contact with patient/caregiver for an initial transitions of care follow up call post discharge without success. I will reach out again at a later time. Any previously scheduled hospital follow up appointments noted below.         Discharge department/facility: [facility]    Scheduled appointment with PCP within 7-14 days    Follow Up  Future Appointments   Date Time Provider [address] [place] [address]   2023  1:00 PM TAMIA Rangel KENDALL [place], [place]

## 2023-09-26 NOTE — OUTREACH NOTE
Prep Survey      Flowsheet Row Responses   [facility] facility patient discharged from? [facility]   Is LACE score < 7 ? No   Eligibility Readm Mgmt   Discharge diagnosis Acute encephalopathy due to UTI   Does the patient have one of the following disease processes/diagnoses(primary or secondary)? Other   Does the patient have Home health ordered? No   Is there a DME ordered? No   Prep survey completed? Yes            Comfort COWAN - Registered Nurse

## 2023-09-27 ENCOUNTER — TELEPHONE (OUTPATIENT)
Dept: INTERNAL MEDICINE | Age: 83
End: 2023-09-27

## 2023-09-27 NOTE — TELEPHONE ENCOUNTER
[address] [facility] Transitions Initial Follow Up Call    Outreach made within 2 business days of discharge: Yes    Patient: Lane Day   Patient : 1940 MRN: [id number]    Reason for Admission: acute mental status change    Discharge Date: 2023     Discharge Diagnosis:   **Acute encephalopathy due to UTI   Polypharmacy   Acute UTI (urinary tract infection)   TESS (obstructive sleep apnea)   Chronic constipation   Hypothyroidism (acquired)   Essential hypertension   Spinal stenosis, lumbar region, without neurogenic claudication   Gastroesophageal reflux disease   Patient is a 79-year-old female who was admitted on  for an acute mental status change. Patient was found to have an acute cystitis and she is currently on antibiotics. Patient is also on significant amount of narcotics. Review of [facility] showed patient is on 15 mg oxycodone 4 times a day, butalbital/acetaminophen/caffeine twice a day. Making daily MME significant. Spoke with: 2nd message left for the patient to call us back to schedule a TCM  and do the TCM note.     Discharge department/facility: [address] [place] [address]    Scheduled appointment with PCP within 7-14 days    Follow Up  Future Appointments   Date Time Provider [address] [place] [address]   2023  1:00 PM Maryclare Wheeler, APRN [facility] [place]

## 2023-09-28 LAB
BACTERIA SPEC AEROBE CULT: NORMAL
BACTERIA SPEC AEROBE CULT: NORMAL

## 2023-09-29 ENCOUNTER — READMISSION MANAGEMENT (OUTPATIENT)
Dept: CALL CENTER | Facility: HOSPITAL | Age: 83
End: 2023-09-29
Payer: MEDICARE

## 2023-09-29 NOTE — OUTREACH NOTE
Medical Week 1 Survey      Flowsheet Row Responses   [facility] patient discharged from? [place]   Does the patient have one of the following disease processes/diagnoses(primary or secondary)? Other   Week 1 attempt successful? Yes   Call start time 1550   Call end time 1552   Discharge diagnosis Acute encephalopathy due to UTI   Person spoke with today (if not patient) and relationship Ivonne   Meds reviewed with patient/caregiver? Yes   Is the patient having any side effects they believe may be caused by any medication additions or changes? No   Does the patient have all medications ordered at discharge? Yes   Is the patient taking all medications as directed (includes completed medication regime)? Yes   Does the patient have a primary care provider?  Yes   Does the patient have an appointment with their PCP within 7 days of discharge? No   What is preventing the patient from scheduling follow up appointments within 7 days of discharge? Haven't had time   Nursing Interventions Educated patient on importance of making appointment, Advised patient to make appointment   Has the patient kept scheduled appointments due by today? N/A   What is the Home health agency?  [facility]   Has home health visited the patient within 72 hours of discharge? Yes   Psychosocial issues? No   Did the patient receive a copy of their discharge instructions? Yes   Nursing interventions Reviewed instructions with patient   What is the patient's perception of their health status since discharge? Improving   Is the patient/caregiver able to teach back signs and symptoms related to disease process for when to call PCP? Yes   Is the patient/caregiver able to teach back signs and symptoms related to disease process for when to call 911? Yes   Is the patient/caregiver able to teach back the hierarchy of who to call/visit for symptoms/problems? PCP, Specialist, Home health nurse, Urgent Care, ED, 911 Yes   Week 1 call completed?  Yes   Graduated Yes   Graduated/Revoked comments Daughter reports Pt is improving and is taking ABT. Pt will call for PCP appt   Call end time 2412            DANNY MILLER - Registered Nurse

## 2023-10-02 ENCOUNTER — TELEPHONE (OUTPATIENT)
Dept: INTERNAL MEDICINE CLINIC | Age: 83
End: 2023-10-02

## 2023-10-02 NOTE — TELEPHONE ENCOUNTER
[facility] OT reporting that pt has \"  no further OT needs at this time  pt is able to complete UE HEP I   much improved with transfers and per pt/daughter is tolerating longer standing and performing mobiity tasks  Resources/Caregiver:  has needed DME  good support from family in home  pain :  no pain reported  falls :   no falls reported  progress toward goals :   good progress toward OT goals \"     Fyi     Nursing and PT is still seeing this pt

## 2023-10-09 ENCOUNTER — TELEPHONE (OUTPATIENT)
Dept: INTERNAL MEDICINE CLINIC | Age: 83
End: 2023-10-09

## 2023-10-09 NOTE — TELEPHONE ENCOUNTER
[facility] reporting pt has been discharged from [facility] as of 10/ 6     Per [facility] PT  \"  Assessment:  Pt. has met 2 of 3 goals and is appropriate for discharge due to reaching a plateau in progress. She is able to complete transfers from multiple surfaces in the home with good safety with use of hands to push up and use of AD upon standing for balance. Increased walking distance of 120 ft on 10/3/2023, but was only able to walk 75 ft today with 1 seated rest break. Increased fatigue overall, very slow ramy, decreased B foot clearance, good stride length, and was unable to ambulate with hands on AD instead of resting forearms on rollator. Pt. would frequently stop walking due to spacing out and would need cues to continue walking. Increased strength in B LEs with hamstring grade of 3+/5 and all other MMT of 4/5. Improved balance with a tinetti score of 15/28, but is still considered a high fall risk. 1 fall on 10/6/2023 in the AM since starting homecare services. Discharged to [facility] at this time.  \"

## 2023-10-11 ENCOUNTER — TELEPHONE (OUTPATIENT)
Dept: INTERNAL MEDICINE | Age: 83
End: 2023-10-11

## 2023-10-11 NOTE — TELEPHONE ENCOUNTER
Patient would a call back to discuss needing pain medication for abdominal and rectal pain. She states the ibuprofen upsets her stomach.
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
## 2023-10-30 DIAGNOSIS — I10 PRIMARY HYPERTENSION: ICD-10-CM

## 2023-10-30 PROBLEM — J34.89 SINUS PAIN: Status: RESOLVED | Noted: 2021-07-13 | Resolved: 2023-10-30

## 2023-10-30 PROBLEM — R51.9 PERSISTENT HEADACHES: Status: RESOLVED | Noted: 2018-12-24 | Resolved: 2023-10-30

## 2023-10-30 PROBLEM — N17.0 ACUTE KIDNEY INJURY (AKI) WITH ACUTE TUBULAR NECROSIS (ATN) (HCC): Status: RESOLVED | Noted: 2023-09-14 | Resolved: 2023-10-30

## 2023-10-30 PROBLEM — G93.40 ENCEPHALOPATHY: Status: RESOLVED | Noted: 2022-08-30 | Resolved: 2023-10-30

## 2023-10-30 PROBLEM — R20.8 RECTAL BURNING: Status: RESOLVED | Noted: 2022-11-28 | Resolved: 2023-10-30

## 2023-10-30 PROBLEM — K81.0 ACUTE CHOLECYSTITIS: Status: RESOLVED | Noted: 2023-09-14 | Resolved: 2023-10-30

## 2023-10-30 PROBLEM — M54.2 NECK PAIN: Status: RESOLVED | Noted: 2021-07-13 | Resolved: 2023-10-30

## 2023-10-30 LAB
BACTERIA #/AREA URNS HPF: ABNORMAL /HPF
BILIRUB UR QL STRIP: NEGATIVE
CLARITY UR: CLEAR
COLOR UR: YELLOW
GLUCOSE UR STRIP.AUTO-MCNC: NEGATIVE MG/DL
HGB UR STRIP.AUTO-MCNC: NEGATIVE MG/L
KETONES UR STRIP.AUTO-MCNC: NEGATIVE MG/DL
LEUKOCYTE ESTERASE UR QL STRIP.AUTO: ABNORMAL
NITRITE UR QL STRIP.AUTO: NEGATIVE
PH UR STRIP.AUTO: 7 [PH] (ref 5–8)
PROT UR STRIP.AUTO-MCNC: ABNORMAL MG/DL
RBC #/AREA URNS HPF: ABNORMAL /HPF (ref 0–2)
SP GR UR STRIP.AUTO: 1.01 (ref 1–1.03)
SQUAMOUS #/AREA URNS HPF: ABNORMAL /HPF
UROBILINOGEN UR STRIP.AUTO-MCNC: 1 E.U./DL
WBC #/AREA URNS HPF: ABNORMAL /HPF (ref 0–5)

## 2023-11-05 ENCOUNTER — APPOINTMENT (OUTPATIENT)
Dept: GENERAL RADIOLOGY | Facility: HOSPITAL | Age: 83
End: 2023-11-05
Payer: MEDICARE

## 2023-11-05 ENCOUNTER — APPOINTMENT (OUTPATIENT)
Dept: CT IMAGING | Facility: HOSPITAL | Age: 83
End: 2023-11-05
Payer: MEDICARE

## 2023-11-05 ENCOUNTER — HOSPITAL ENCOUNTER (INPATIENT)
Facility: HOSPITAL | Age: 83
LOS: 4 days | Discharge: HOME HEALTH | End: 2023-11-09
Attending: INTERNAL MEDICINE | Admitting: INTERNAL MEDICINE
Payer: MEDICARE

## 2023-11-05 DIAGNOSIS — D64.89 ANEMIA DUE TO OTHER CAUSE, NOT CLASSIFIED: ICD-10-CM

## 2023-11-05 DIAGNOSIS — E83.42 HYPOMAGNESEMIA: ICD-10-CM

## 2023-11-05 DIAGNOSIS — N30.01 ACUTE CYSTITIS WITH HEMATURIA: Primary | ICD-10-CM

## 2023-11-05 DIAGNOSIS — G93.41 METABOLIC ENCEPHALOPATHY: ICD-10-CM

## 2023-11-05 DIAGNOSIS — E87.6 HYPOKALEMIA: ICD-10-CM

## 2023-11-05 PROBLEM — N39.0 UTI (URINARY TRACT INFECTION): Status: ACTIVE | Noted: 2023-11-05

## 2023-11-05 LAB
ALBUMIN SERPL-MCNC: 2.4 G/DL (ref 3.5–5.2)
ALBUMIN/GLOB SERPL: 0.7 G/DL
ALP SERPL-CCNC: 79 U/L (ref 39–117)
ALT SERPL W P-5'-P-CCNC: 7 U/L (ref 1–33)
ANION GAP SERPL CALCULATED.3IONS-SCNC: 11 MMOL/L (ref 5–15)
ARTERIAL PATENCY WRIST A: POSITIVE
AST SERPL-CCNC: 12 U/L (ref 1–32)
ATMOSPHERIC PRESS: 752 MMHG
BACTERIA UR QL AUTO: ABNORMAL /HPF
BASE EXCESS BLDA CALC-SCNC: 2.8 MMOL/L (ref 0–2)
BASOPHILS # BLD AUTO: 0.02 10*3/MM3 (ref 0–0.2)
BASOPHILS NFR BLD AUTO: 0.2 % (ref 0–1.5)
BDY SITE: ABNORMAL
BILIRUB SERPL-MCNC: 0.6 MG/DL (ref 0–1.2)
BILIRUB UR QL STRIP: NEGATIVE
BODY TEMPERATURE: 37 C
BUN SERPL-MCNC: 21 MG/DL (ref 8–23)
BUN/CREAT SERPL: 25.6 (ref 7–25)
CALCIUM SPEC-SCNC: 9 MG/DL (ref 8.6–10.5)
CHLORIDE SERPL-SCNC: 105 MMOL/L (ref 98–107)
CLARITY UR: ABNORMAL
CO2 SERPL-SCNC: 24 MMOL/L (ref 22–29)
COLOR UR: YELLOW
CREAT SERPL-MCNC: 0.82 MG/DL (ref 0.57–1)
D-LACTATE SERPL-SCNC: 0.7 MMOL/L (ref 0.5–2)
D-LACTATE SERPL-SCNC: 2.9 MMOL/L (ref 0.5–2)
DEPRECATED RDW RBC AUTO: 53.2 FL (ref 37–54)
EGFRCR SERPLBLD CKD-EPI 2021: 72 ML/MIN/1.73
EOSINOPHIL # BLD AUTO: 0 10*3/MM3 (ref 0–0.4)
EOSINOPHIL NFR BLD AUTO: 0 % (ref 0.3–6.2)
ERYTHROCYTE [DISTWIDTH] IN BLOOD BY AUTOMATED COUNT: 15 % (ref 12.3–15.4)
GAS FLOW AIRWAY: 2 LPM
GLOBULIN UR ELPH-MCNC: 3.6 GM/DL
GLUCOSE SERPL-MCNC: 127 MG/DL (ref 65–99)
GLUCOSE UR STRIP-MCNC: NEGATIVE MG/DL
HCO3 BLDA-SCNC: 25.3 MMOL/L (ref 20–26)
HCT VFR BLD AUTO: 25.2 % (ref 34–46.6)
HGB BLD-MCNC: 7.7 G/DL (ref 12–15.9)
HGB UR QL STRIP.AUTO: ABNORMAL
HYALINE CASTS UR QL AUTO: ABNORMAL /LPF
IMM GRANULOCYTES # BLD AUTO: 0.09 10*3/MM3 (ref 0–0.05)
IMM GRANULOCYTES NFR BLD AUTO: 0.9 % (ref 0–0.5)
KETONES UR QL STRIP: NEGATIVE
LEUKOCYTE ESTERASE UR QL STRIP.AUTO: ABNORMAL
LIPASE SERPL-CCNC: 25 U/L (ref 13–60)
LYMPHOCYTES # BLD AUTO: 0.78 10*3/MM3 (ref 0.7–3.1)
LYMPHOCYTES NFR BLD AUTO: 7.7 % (ref 19.6–45.3)
Lab: ABNORMAL
MAGNESIUM SERPL-MCNC: 1.4 MG/DL (ref 1.6–2.4)
MCH RBC QN AUTO: 29.6 PG (ref 26.6–33)
MCHC RBC AUTO-ENTMCNC: 30.6 G/DL (ref 31.5–35.7)
MCV RBC AUTO: 96.9 FL (ref 79–97)
MODALITY: ABNORMAL
MONOCYTES # BLD AUTO: 0.68 10*3/MM3 (ref 0.1–0.9)
MONOCYTES NFR BLD AUTO: 6.7 % (ref 5–12)
NEUTROPHILS NFR BLD AUTO: 8.54 10*3/MM3 (ref 1.7–7)
NEUTROPHILS NFR BLD AUTO: 84.5 % (ref 42.7–76)
NITRITE UR QL STRIP: POSITIVE
NRBC BLD AUTO-RTO: 0 /100 WBC (ref 0–0.2)
PCO2 BLDA: 28.9 MM HG (ref 35–45)
PCO2 TEMP ADJ BLD: 28.9 MM HG (ref 35–45)
PH BLDA: 7.55 PH UNITS (ref 7.35–7.45)
PH UR STRIP.AUTO: 6.5 [PH] (ref 5–8)
PH, TEMP CORRECTED: 7.55 PH UNITS (ref 7.35–7.45)
PLATELET # BLD AUTO: 217 10*3/MM3 (ref 140–450)
PMV BLD AUTO: 10.2 FL (ref 6–12)
PO2 BLDA: 106 MM HG (ref 83–108)
PO2 TEMP ADJ BLD: 106 MM HG (ref 83–108)
POTASSIUM SERPL-SCNC: 2.7 MMOL/L (ref 3.5–5.2)
PROT SERPL-MCNC: 6 G/DL (ref 6–8.5)
PROT UR QL STRIP: ABNORMAL
RBC # BLD AUTO: 2.6 10*6/MM3 (ref 3.77–5.28)
RBC # UR STRIP: ABNORMAL /HPF
REF LAB TEST METHOD: ABNORMAL
SAO2 % BLDCOA: 99.4 % (ref 94–99)
SODIUM SERPL-SCNC: 140 MMOL/L (ref 136–145)
SP GR UR STRIP: 1.02 (ref 1–1.03)
SQUAMOUS #/AREA URNS HPF: ABNORMAL /HPF
UROBILINOGEN UR QL STRIP: ABNORMAL
VENTILATOR MODE: ABNORMAL
WBC # UR STRIP: ABNORMAL /HPF
WBC CLUMPS # UR AUTO: ABNORMAL /HPF
WBC NRBC COR # BLD: 10.11 10*3/MM3 (ref 3.4–10.8)
YEAST URNS QL MICRO: ABNORMAL /HPF

## 2023-11-05 PROCEDURE — 81001 URINALYSIS AUTO W/SCOPE: CPT | Performed by: INTERNAL MEDICINE

## 2023-11-05 PROCEDURE — 25810000003 LACTATED RINGERS SOLUTION: Performed by: INTERNAL MEDICINE

## 2023-11-05 PROCEDURE — 36600 WITHDRAWAL OF ARTERIAL BLOOD: CPT

## 2023-11-05 PROCEDURE — 99285 EMERGENCY DEPT VISIT HI MDM: CPT

## 2023-11-05 PROCEDURE — 87077 CULTURE AEROBIC IDENTIFY: CPT | Performed by: INTERNAL MEDICINE

## 2023-11-05 PROCEDURE — 36415 COLL VENOUS BLD VENIPUNCTURE: CPT

## 2023-11-05 PROCEDURE — 70450 CT HEAD/BRAIN W/O DYE: CPT

## 2023-11-05 PROCEDURE — 71045 X-RAY EXAM CHEST 1 VIEW: CPT

## 2023-11-05 PROCEDURE — 74177 CT ABD & PELVIS W/CONTRAST: CPT

## 2023-11-05 PROCEDURE — 25510000001 IOPAMIDOL 61 % SOLUTION: Performed by: INTERNAL MEDICINE

## 2023-11-05 PROCEDURE — 25010000002 MAGNESIUM SULFATE 2 GM/50ML SOLUTION: Performed by: INTERNAL MEDICINE

## 2023-11-05 PROCEDURE — 87147 CULTURE TYPE IMMUNOLOGIC: CPT | Performed by: INTERNAL MEDICINE

## 2023-11-05 PROCEDURE — 25010000002 POTASSIUM CHLORIDE 10 MEQ/100ML SOLUTION: Performed by: INTERNAL MEDICINE

## 2023-11-05 PROCEDURE — 85025 COMPLETE CBC W/AUTO DIFF WBC: CPT | Performed by: INTERNAL MEDICINE

## 2023-11-05 PROCEDURE — 83690 ASSAY OF LIPASE: CPT | Performed by: INTERNAL MEDICINE

## 2023-11-05 PROCEDURE — 87040 BLOOD CULTURE FOR BACTERIA: CPT | Performed by: INTERNAL MEDICINE

## 2023-11-05 PROCEDURE — 87150 DNA/RNA AMPLIFIED PROBE: CPT | Performed by: INTERNAL MEDICINE

## 2023-11-05 PROCEDURE — 82803 BLOOD GASES ANY COMBINATION: CPT

## 2023-11-05 PROCEDURE — 25010000002 PIPERACILLIN SOD-TAZOBACTAM PER 1 G: Performed by: INTERNAL MEDICINE

## 2023-11-05 PROCEDURE — 87086 URINE CULTURE/COLONY COUNT: CPT | Performed by: INTERNAL MEDICINE

## 2023-11-05 PROCEDURE — 80053 COMPREHEN METABOLIC PANEL: CPT | Performed by: INTERNAL MEDICINE

## 2023-11-05 PROCEDURE — P9612 CATHETERIZE FOR URINE SPEC: HCPCS

## 2023-11-05 PROCEDURE — 87186 SC STD MICRODIL/AGAR DIL: CPT | Performed by: INTERNAL MEDICINE

## 2023-11-05 PROCEDURE — 83735 ASSAY OF MAGNESIUM: CPT | Performed by: INTERNAL MEDICINE

## 2023-11-05 PROCEDURE — 83605 ASSAY OF LACTIC ACID: CPT | Performed by: INTERNAL MEDICINE

## 2023-11-05 RX ORDER — POTASSIUM CHLORIDE 7.45 MG/ML
10 INJECTION INTRAVENOUS ONCE
Status: COMPLETED | OUTPATIENT
Start: 2023-11-05 | End: 2023-11-05

## 2023-11-05 RX ORDER — MAGNESIUM SULFATE HEPTAHYDRATE 40 MG/ML
2 INJECTION, SOLUTION INTRAVENOUS ONCE
Status: COMPLETED | OUTPATIENT
Start: 2023-11-05 | End: 2023-11-05

## 2023-11-05 RX ADMIN — SODIUM CHLORIDE, POTASSIUM CHLORIDE, SODIUM LACTATE AND CALCIUM CHLORIDE 500 ML: 600; 310; 30; 20 INJECTION, SOLUTION INTRAVENOUS at 16:36

## 2023-11-05 RX ADMIN — POTASSIUM CHLORIDE 10 MEQ: 7.46 INJECTION, SOLUTION INTRAVENOUS at 19:30

## 2023-11-05 RX ADMIN — MAGNESIUM SULFATE HEPTAHYDRATE 2 G: 2 INJECTION, SOLUTION INTRAVENOUS at 18:19

## 2023-11-05 RX ADMIN — IOPAMIDOL 100 ML: 612 INJECTION, SOLUTION INTRAVENOUS at 17:43

## 2023-11-05 RX ADMIN — PIPERACILLIN SODIUM AND TAZOBACTAM SODIUM 3.38 G: 3; .375 INJECTION, POWDER, LYOPHILIZED, FOR SOLUTION INTRAVENOUS at 18:11

## 2023-11-05 NOTE — ED PROVIDER NOTES
Subjective   History of Present Illness  83-year-old female who presents emergency department with complaints of confusion.  There is no family at bedside.  I was not available when EMS dropped the patient off.  The patient only moans.  She is unable to give history.  Nurses note that she did come from home.  Apparently, she is normally conversant and appropriate.  When asked if she has pain, she tells me no.    Review of Systems   Constitutional:  Positive for fever.   Psychiatric/Behavioral:  Positive for confusion.        Past Medical History:   Diagnosis Date    Anxiety     Arthritis     Bronchitis     Cancer     uterine    Chronic pain     Depression     Disease of thyroid gland     Fibromyalgia     GERD (gastroesophageal reflux disease)     Headache     History of transfusion     AS     Hyperlipidemia     Hypertension     Incontinence     Insomnia     Leg pain     Lumbar stenosis     Migraines     Peptic ulcer     Restless legs     Sleep apnea     NO C-PAP    UTI (urinary tract infection)     Vaginal bleeding        Allergies   Allergen Reactions    Ropinirole Hcl Shortness Of Breath    Codeine Itching and Mental Status Change    Definity [Perflutren Lipid Microsphere] Other (See Comments)     Severe back pain    Ambien [Zolpidem] Other (See Comments)     HYPER     Eszopiclone Other (See Comments)     MAKES PT HYPER     Pregabalin Dizziness    Tizanidine Other (See Comments)     Terrible nightmares       Past Surgical History:   Procedure Laterality Date    BLADDER REPAIR      MESH HAD TO BE REMOVED IN 2013    BREAST BIOPSY Right 2017    benign    BREAST CYST EXCISION Left     CARDIAC CATHETERIZATION      CARPAL TUNNEL RELEASE      CATARACT EXTRACTION W/ INTRAOCULAR LENS  IMPLANT, BILATERAL      CHOLECYSTECTOMY WITH INTRAOPERATIVE CHOLANGIOGRAM N/A 2023    Procedure: CHOLECYSTECTOMY LAPAROSCOPIC INTRAOPERATIVE CHOLANGIOGRAM;  Surgeon: Gerardo Arciniega MD;  Location: [facility] OR;  Service:  General;  Laterality: N/A;    COLONOSCOPY      COLONOSCOPY N/A 10/01/2021    Procedure: COLONOSCOPY WITH ANESTHESIA;  Surgeon: Tom Velasco DO;  Location: [facility] ENDOSCOPY;  Service: Gastroenterology;  Laterality: N/A;  pre: change in bowel habits  post: diverticulosis. hemorrhoids.   Olivia Mora APRN        CYSTECTOMY      D & C HYSTEROSCOPY N/A 11/06/2017    Procedure: DILATATION AND CURETTAGE HYSTEROSCOPY;  Surgeon: Shasta Madrigal MD;  Location: [facility] OR;  Service:     DILATION AND CURETTAGE, DIAGNOSTIC / THERAPEUTIC  2008    ENDOSCOPY  09/23/2010    Short segment of Arriola's,Moderate chroninc esophagogastritis and negative H.pylori    ENDOSCOPY N/A 09/25/2017    Procedure: ESOPHAGOGASTRODUODENOSCOPY WITH ANESTHESIA;  Surgeon: Tom Velasco DO;  Location: [facility] ENDOSCOPY;  Service:     EYE SURGERY      RETINA    HEMORRHOIDECTOMY SIGMOIDOSCOPY N/A 3/21/2023    Procedure: HEMORRHOIDECTOMY WITH EXAM UNDER ANESTHESIA;  Surgeon: Holly Chavez MD;  Location: [facility] OR;  Service: General;  Laterality: N/A;    HYSTERECTOMY  12/20/2017    ORIF TIBIA/FIBULA FRACTURES Left 2000    TRANSVAGINAL TAPING SUSPENSION N/A 11/06/2017    Procedure: VAGINAL MESH REVISION;  Surgeon: Shasta Madrigal MD;  Location: [facility] OR;  Service:     VAGINAL MESH REVISION  2013       Family History   Problem Relation Age of Onset    Diabetes Mother     Multiple myeloma Mother     Stroke Father     Diabetes Sister     Prostate cancer Brother     Lymphoma Brother         NHL    Ovarian cancer Paternal Aunt     Cancer Paternal Grandmother         metastatic    Lung cancer Paternal Grandfather     Colon cancer Neg Hx     Esophageal cancer Neg Hx     Breast cancer Neg Hx        Social History     Socioeconomic History    Marital status:    Tobacco Use    Smoking status: Never    Smokeless tobacco: Never   Vaping Use    Vaping Use: Never used   Substance and Sexual Activity    Alcohol use: Not Currently     Comment:  occasional    Drug use: No    Sexual activity: Defer           Objective   Physical Exam  Vitals reviewed.   Constitutional:       Appearance: She is ill-appearing.   HENT:      Head: Normocephalic and atraumatic.      Right Ear: External ear normal.      Left Ear: External ear normal.      Nose: Nose normal.   Eyes:      General: No scleral icterus.     Conjunctiva/sclera: Conjunctivae normal.   Cardiovascular:      Rate and Rhythm: Normal rate and regular rhythm.      Heart sounds: Normal heart sounds.   Pulmonary:      Effort: Pulmonary effort is normal.      Breath sounds: Normal breath sounds.   Abdominal:      General: There is no distension.      Palpations: Abdomen is soft.      Tenderness: There is no abdominal tenderness.   Musculoskeletal:         General: No tenderness.      Cervical back: Normal range of motion and neck supple.      Right lower leg: Edema present.      Left lower leg: Edema present.      Comments: Lower extremity edema, but nonpitting.   Skin:     General: Skin is warm and dry.   Neurological:      Mental Status: She is alert. She is disoriented.      Cranial Nerves: No cranial nerve deficit.   Psychiatric:      Comments: Confused and moaning.         Procedures       Lab Results (last 24 hours)       Procedure Component Value Units Date/Time    Blood Culture - Blood, Hand, Left [[id number]] Collected: 11/05/23 1630    Specimen: Blood from Hand, Left Updated: 11/05/23 1648    CBC & Differential [[id number]]  (Abnormal) Collected: 11/05/23 1636    Specimen: Blood Updated: 11/05/23 1649    Narrative:      The following orders were created for panel order CBC & Differential.  Procedure                               Abnormality         Status                     ---------                               -----------         ------                     CBC Auto Differential[[id number]]        Abnormal            Final result                 Please view results for these tests on the individual  orders.    Comprehensive Metabolic Panel [[id number]]  (Abnormal) Collected: 11/05/23 1636    Specimen: Blood Updated: 11/05/23 1707     Glucose 127 mg/dL      BUN 21 mg/dL      Creatinine 0.82 mg/dL      Sodium 140 mmol/L      Potassium 2.7 mmol/L      Chloride 105 mmol/L      CO2 24.0 mmol/L      Calcium 9.0 mg/dL      Total Protein 6.0 g/dL      Albumin 2.4 g/dL      ALT (SGPT) 7 U/L      AST (SGOT) 12 U/L      Alkaline Phosphatase 79 U/L      Total Bilirubin 0.6 mg/dL      Globulin 3.6 gm/dL      A/G Ratio 0.7 g/dL      BUN/Creatinine Ratio 25.6     Anion Gap 11.0 mmol/L      eGFR 72.0 mL/min/1.73     Narrative:      GFR Normal >60  Chronic Kidney Disease <60  Kidney Failure <15    The GFR formula is only valid for adults with stable renal function between ages 18 and 70.    Lactic Acid, Plasma [[id number]]  (Abnormal) Collected: 11/05/23 1636    Specimen: Blood Updated: 11/05/23 1709     Lactate 2.9 mmol/L     Lipase [[id number]]  (Normal) Collected: 11/05/23 1636    Specimen: Blood Updated: 11/05/23 1702     Lipase 25 U/L     Blood Culture - Blood, Wrist, Left [[id number]] Collected: 11/05/23 1636    Specimen: Blood from Wrist, Left Updated: 11/05/23 1648    CBC Auto Differential [[id number]]  (Abnormal) Collected: 11/05/23 1636    Specimen: Blood Updated: 11/05/23 1649     WBC 10.11 10*3/mm3      RBC 2.60 10*6/mm3      Hemoglobin 7.7 g/dL      Hematocrit 25.2 %      MCV 96.9 fL      MCH 29.6 pg      MCHC 30.6 g/dL      RDW 15.0 %      RDW-SD 53.2 fl      MPV 10.2 fL      Platelets 217 10*3/mm3      Neutrophil % 84.5 %      Lymphocyte % 7.7 %      Monocyte % 6.7 %      Eosinophil % 0.0 %      Basophil % 0.2 %      Immature Grans % 0.9 %      Neutrophils, Absolute 8.54 10*3/mm3      Lymphocytes, Absolute 0.78 10*3/mm3      Monocytes, Absolute 0.68 10*3/mm3      Eosinophils, Absolute 0.00 10*3/mm3      Basophils, Absolute 0.02 10*3/mm3      Immature Grans, Absolute 0.09 10*3/mm3      nRBC 0.0 /100 WBC     Magnesium  [[id number]]  (Abnormal) Collected: 11/05/23 1636    Specimen: Blood Updated: 11/05/23 1736     Magnesium 1.4 mg/dL     Urinalysis With Culture If Indicated - Straight Cath [[id number]]  (Abnormal) Collected: 11/05/23 1651    Specimen: Urine from Straight Cath Updated: 11/05/23 1717     Color, UA Yellow     Appearance, UA Turbid     pH, UA 6.5     Specific Gravity, UA 1.017     Glucose, UA Negative     Ketones, UA Negative     Bilirubin, UA Negative     Blood, UA Small (1+)     Protein, UA 30 mg/dL (1+)     Leuk Esterase, UA Large (3+)     Nitrite, UA Positive     Urobilinogen, UA 1.0 E.U./dL    Narrative:      In absence of clinical symptoms, the presence of pyuria, bacteria, and/or nitrites on the urinalysis result does not correlate with infection.    Urinalysis, Microscopic Only - Straight Cath [[id number]]  (Abnormal) Collected: 11/05/23 1651    Specimen: Urine from Straight Cath Updated: 11/05/23 1723     RBC, UA 3-5 /HPF      WBC, UA Too Numerous to Count /HPF      Bacteria, UA 4+ /HPF      Squamous Epithelial Cells, UA 0-2 /HPF      Yeast, UA Small/1+ Budding Yeast /HPF      Hyaline Casts, UA None Seen /LPF      WBC Clumps, UA Small/1+ /HPF      Methodology Manual Light Microscopy    Urine Culture - Urine, Straight Cath [[id number]] Collected: 11/05/23 1651    Specimen: Urine from Straight Cath Updated: 11/05/23 1723    Blood Gas, Arterial - [[id number]]  (Abnormal) Collected: 11/05/23 1803    Specimen: Arterial Blood Updated: 11/05/23 1803     Site Right Radial     Donny's Test Positive     pH, Arterial 7.550 pH units      Comment: 83 Value above reference range        pCO2, Arterial 28.9 mm Hg      Comment: 84 Value below reference range        pO2, Arterial 106.0 mm Hg      HCO3, Arterial 25.3 mmol/L      Base Excess, Arterial 2.8 mmol/L      Comment: 83 Value above reference range        O2 Saturation, Arterial 99.4 %      Comment: 83 Value above reference range        Temperature 37.0 C      Barometric  Pressure for Blood Gas 752 mmHg      Modality Nasal Cannula     Flow Rate 2.0 lpm      Ventilator Mode NA     Collected by [id number]     Comment: Meter: [id number]     :  [id number]        pCO2, Temperature Corrected 28.9 mm Hg      pH, Temp Corrected 7.550 pH Units      pO2, Temperature Corrected 106 mm Hg     STAT Lactic Acid, Reflex [[id number]]  (Normal) Collected: 11/05/23 1938    Specimen: Blood Updated: 11/05/23 2008     Lactate 0.7 mmol/L           CT Head Without Contrast   Final Result   1. Stable exam. Moderate cerebral volume loss with chronic vessel   ischemia. No acute intracranial process. Chronic opacification of the   right greater than left maxillary sinuses.           This report was signed and finalized on 11/5/2023 5:48 PM CST by Dr. Blanka Pak MD.          CT Abdomen Pelvis With Contrast   Final Result   Moderate stool seen throughout the colon with borderline wall thickening   of the rectum. Proctitis versus stercoral colitis considered. No free   air or abscess. No small bowel dilatation. Very small hiatal hernia.       2. Moderate right-sided hydronephrosis, only slightly increased compared   to 9/4/2023. Right ureter dilated to the level of the pelvic inlet with   diminished visualization of the right distal ureter. No discrete   obstructing stone. If this finding is unknown, a follow-up nonemergent   CT urography study could be performed for further evaluation following   24 hours after today's contrast administration.       3. Stable mild splenomegaly. 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       This report was signed and finalized on 11/5/2023 5:58 PM CST by Dr. Blanka Pak MD.          XR Chest 1 View   Final Result   1. Chronic elevation right hemidiaphragm. Right basilar opacities may   relate to atelectasis versus pneumonia. Otherwise no acute process   identified.           This report was  signed and finalized on 11/5/2023 5:21 PM CST by Dr. Blanka Pak MD.                ED Course  ED Course as of 11/05/23 2134   Sun Nov 05, 2023 2054 Spoke with hospitalist, he is agreeable to admission. [AJ]      ED Course User Index  [AJ] Ancelmo Queen DO                                           Medical Decision Making  Problems Addressed:  Acute cystitis with hematuria: complicated acute illness or injury  Hypokalemia: complicated acute illness or injury  Hypomagnesemia: complicated acute illness or injury  Metabolic encephalopathy: complicated acute illness or injury    Amount and/or Complexity of Data Reviewed  Labs: ordered.     Details: CBC CMP blood cultures urinalysis  Radiology: ordered.     Details: Chest x-ray CT of the head    Risk  Prescription drug management.  Decision regarding hospitalization.        Final diagnoses:   Acute cystitis with hematuria   Metabolic encephalopathy   Hypokalemia   Hypomagnesemia       ED Disposition  ED Disposition       ED Disposition   Decision to Admit    Condition   --    Comment   Level of Care: Med/Surg [1]   Diagnosis: UTI (urinary tract infection) [303442]   Admitting Physician: GABO HARTMANN [[id number]]   Certification: I Certify That Inpatient Hospital Services Are Medically Necessary For Greater Than 2 Midnights                 No follow-up provider specified.       Medication List      No changes were made to your prescriptions during this visit.            Ancelmo Queen,   11/05/23 1624       Ancelmo Queen DO  11/05/23 2134

## 2023-11-06 LAB
ABO GROUP BLD: NORMAL
ANION GAP SERPL CALCULATED.3IONS-SCNC: 6 MMOL/L (ref 5–15)
ANION GAP SERPL CALCULATED.3IONS-SCNC: 9 MMOL/L (ref 5–15)
BACTERIA BLD CULT: ABNORMAL
BACTERIA ID TEST ISLT QL CULT: ABNORMAL
BASOPHILS # BLD AUTO: 0.01 10*3/MM3 (ref 0–0.2)
BASOPHILS NFR BLD AUTO: 0.1 % (ref 0–1.5)
BLD GP AB SCN SERPL QL: NEGATIVE
BOTTLE TYPE: ABNORMAL
BUN SERPL-MCNC: 16 MG/DL (ref 8–23)
BUN SERPL-MCNC: 19 MG/DL (ref 8–23)
BUN/CREAT SERPL: 22.5 (ref 7–25)
BUN/CREAT SERPL: 24.1 (ref 7–25)
CALCIUM SPEC-SCNC: 8.4 MG/DL (ref 8.6–10.5)
CALCIUM SPEC-SCNC: 8.5 MG/DL (ref 8.6–10.5)
CHLORIDE SERPL-SCNC: 106 MMOL/L (ref 98–107)
CHLORIDE SERPL-SCNC: 108 MMOL/L (ref 98–107)
CO2 SERPL-SCNC: 26 MMOL/L (ref 22–29)
CO2 SERPL-SCNC: 27 MMOL/L (ref 22–29)
CREAT SERPL-MCNC: 0.71 MG/DL (ref 0.57–1)
CREAT SERPL-MCNC: 0.79 MG/DL (ref 0.57–1)
DEPRECATED RDW RBC AUTO: 52.6 FL (ref 37–54)
EGFRCR SERPLBLD CKD-EPI 2021: 75.3 ML/MIN/1.73
EGFRCR SERPLBLD CKD-EPI 2021: 85.5 ML/MIN/1.73
EOSINOPHIL # BLD AUTO: 0.01 10*3/MM3 (ref 0–0.4)
EOSINOPHIL NFR BLD AUTO: 0.1 % (ref 0.3–6.2)
ERYTHROCYTE [DISTWIDTH] IN BLOOD BY AUTOMATED COUNT: 14.7 % (ref 12.3–15.4)
FERRITIN SERPL-MCNC: 456.4 NG/ML (ref 13–150)
GLUCOSE SERPL-MCNC: 114 MG/DL (ref 65–99)
GLUCOSE SERPL-MCNC: 153 MG/DL (ref 65–99)
HCT VFR BLD AUTO: 22.1 % (ref 34–46.6)
HCT VFR BLD AUTO: 22.2 % (ref 34–46.6)
HGB BLD-MCNC: 6.6 G/DL (ref 12–15.9)
HGB BLD-MCNC: 6.7 G/DL (ref 12–15.9)
IMM GRANULOCYTES # BLD AUTO: 0.05 10*3/MM3 (ref 0–0.05)
IMM GRANULOCYTES NFR BLD AUTO: 0.6 % (ref 0–0.5)
IRON 24H UR-MRATE: 16 MCG/DL (ref 37–145)
IRON SATN MFR SERPL: 12 % (ref 20–50)
LYMPHOCYTES # BLD AUTO: 0.85 10*3/MM3 (ref 0.7–3.1)
LYMPHOCYTES NFR BLD AUTO: 10.2 % (ref 19.6–45.3)
MAGNESIUM SERPL-MCNC: 1.8 MG/DL (ref 1.6–2.4)
MCH RBC QN AUTO: 29.6 PG (ref 26.6–33)
MCHC RBC AUTO-ENTMCNC: 30.3 G/DL (ref 31.5–35.7)
MCV RBC AUTO: 97.8 FL (ref 79–97)
MONOCYTES # BLD AUTO: 0.54 10*3/MM3 (ref 0.1–0.9)
MONOCYTES NFR BLD AUTO: 6.5 % (ref 5–12)
NEUTROPHILS NFR BLD AUTO: 6.89 10*3/MM3 (ref 1.7–7)
NEUTROPHILS NFR BLD AUTO: 82.5 % (ref 42.7–76)
NRBC BLD AUTO-RTO: 0 /100 WBC (ref 0–0.2)
PHOSPHATE SERPL-MCNC: 2.4 MG/DL (ref 2.5–4.5)
PLATELET # BLD AUTO: 168 10*3/MM3 (ref 140–450)
PMV BLD AUTO: 10.7 FL (ref 6–12)
POTASSIUM SERPL-SCNC: 2.5 MMOL/L (ref 3.5–5.2)
POTASSIUM SERPL-SCNC: 3.3 MMOL/L (ref 3.5–5.2)
RBC # BLD AUTO: 2.26 10*6/MM3 (ref 3.77–5.28)
RH BLD: POSITIVE
SODIUM SERPL-SCNC: 141 MMOL/L (ref 136–145)
SODIUM SERPL-SCNC: 141 MMOL/L (ref 136–145)
T&S EXPIRATION DATE: NORMAL
TIBC SERPL-MCNC: 131 MCG/DL (ref 298–536)
TRANSFERRIN SERPL-MCNC: 88 MG/DL (ref 200–360)
WBC NRBC COR # BLD: 8.35 10*3/MM3 (ref 3.4–10.8)

## 2023-11-06 PROCEDURE — 86900 BLOOD TYPING SEROLOGIC ABO: CPT | Performed by: INTERNAL MEDICINE

## 2023-11-06 PROCEDURE — 86923 COMPATIBILITY TEST ELECTRIC: CPT

## 2023-11-06 PROCEDURE — 86901 BLOOD TYPING SEROLOGIC RH(D): CPT | Performed by: INTERNAL MEDICINE

## 2023-11-06 PROCEDURE — 85014 HEMATOCRIT: CPT | Performed by: INTERNAL MEDICINE

## 2023-11-06 PROCEDURE — 84100 ASSAY OF PHOSPHORUS: CPT | Performed by: INTERNAL MEDICINE

## 2023-11-06 PROCEDURE — 85018 HEMOGLOBIN: CPT | Performed by: INTERNAL MEDICINE

## 2023-11-06 PROCEDURE — 86850 RBC ANTIBODY SCREEN: CPT | Performed by: INTERNAL MEDICINE

## 2023-11-06 PROCEDURE — 83540 ASSAY OF IRON: CPT | Performed by: INTERNAL MEDICINE

## 2023-11-06 PROCEDURE — 80048 BASIC METABOLIC PNL TOTAL CA: CPT | Performed by: INTERNAL MEDICINE

## 2023-11-06 PROCEDURE — 83735 ASSAY OF MAGNESIUM: CPT | Performed by: INTERNAL MEDICINE

## 2023-11-06 PROCEDURE — 85025 COMPLETE CBC W/AUTO DIFF WBC: CPT | Performed by: INTERNAL MEDICINE

## 2023-11-06 PROCEDURE — 25010000002 ENOXAPARIN PER 10 MG: Performed by: INTERNAL MEDICINE

## 2023-11-06 PROCEDURE — 25810000003 LACTATED RINGERS PER 1000 ML: Performed by: INTERNAL MEDICINE

## 2023-11-06 PROCEDURE — 84466 ASSAY OF TRANSFERRIN: CPT | Performed by: INTERNAL MEDICINE

## 2023-11-06 PROCEDURE — 25010000002 PIPERACILLIN SOD-TAZOBACTAM PER 1 G: Performed by: INTERNAL MEDICINE

## 2023-11-06 PROCEDURE — 82728 ASSAY OF FERRITIN: CPT | Performed by: INTERNAL MEDICINE

## 2023-11-06 RX ORDER — ATORVASTATIN CALCIUM 40 MG/1
40 TABLET, FILM COATED ORAL DAILY
Status: DISCONTINUED | OUTPATIENT
Start: 2023-11-06 | End: 2023-11-09 | Stop reason: HOSPADM

## 2023-11-06 RX ORDER — CHOLECALCIFEROL (VITAMIN D3) 125 MCG
3000 CAPSULE ORAL AS NEEDED
Status: DISCONTINUED | OUTPATIENT
Start: 2023-11-06 | End: 2023-11-09 | Stop reason: HOSPADM

## 2023-11-06 RX ORDER — BUTALBITAL, ACETAMINOPHEN AND CAFFEINE 50; 325; 40 MG/1; MG/1; MG/1
1 TABLET ORAL 2 TIMES DAILY PRN
Status: DISCONTINUED | OUTPATIENT
Start: 2023-11-06 | End: 2023-11-09 | Stop reason: HOSPADM

## 2023-11-06 RX ORDER — ACETAMINOPHEN 325 MG/1
650 TABLET ORAL EVERY 4 HOURS PRN
Status: DISCONTINUED | OUTPATIENT
Start: 2023-11-06 | End: 2023-11-09 | Stop reason: HOSPADM

## 2023-11-06 RX ORDER — CHOLECALCIFEROL (VITAMIN D3) 125 MCG
5 CAPSULE ORAL NIGHTLY PRN
Status: DISCONTINUED | OUTPATIENT
Start: 2023-11-06 | End: 2023-11-09 | Stop reason: HOSPADM

## 2023-11-06 RX ORDER — DONEPEZIL HYDROCHLORIDE 10 MG/1
10 TABLET, FILM COATED ORAL NIGHTLY
Status: DISCONTINUED | OUTPATIENT
Start: 2023-11-06 | End: 2023-11-09 | Stop reason: HOSPADM

## 2023-11-06 RX ORDER — SUMATRIPTAN 50 MG/1
50 TABLET, FILM COATED ORAL 2 TIMES DAILY PRN
Status: DISCONTINUED | OUTPATIENT
Start: 2023-11-06 | End: 2023-11-09 | Stop reason: HOSPADM

## 2023-11-06 RX ORDER — CALCIUM CARBONATE 500 MG/1
2 TABLET, CHEWABLE ORAL 2 TIMES DAILY PRN
Status: DISCONTINUED | OUTPATIENT
Start: 2023-11-06 | End: 2023-11-09 | Stop reason: HOSPADM

## 2023-11-06 RX ORDER — PHENAZOPYRIDINE HYDROCHLORIDE 100 MG/1
100 TABLET, FILM COATED ORAL
Status: DISCONTINUED | OUTPATIENT
Start: 2023-11-06 | End: 2023-11-09 | Stop reason: HOSPADM

## 2023-11-06 RX ORDER — BISACODYL 10 MG
10 SUPPOSITORY, RECTAL RECTAL DAILY PRN
Status: DISCONTINUED | OUTPATIENT
Start: 2023-11-06 | End: 2023-11-09 | Stop reason: HOSPADM

## 2023-11-06 RX ORDER — SIMETHICONE 80 MG
80 TABLET,CHEWABLE ORAL 4 TIMES DAILY PRN
Status: DISCONTINUED | OUTPATIENT
Start: 2023-11-06 | End: 2023-11-09 | Stop reason: HOSPADM

## 2023-11-06 RX ORDER — SODIUM CHLORIDE 0.9 % (FLUSH) 0.9 %
10 SYRINGE (ML) INJECTION AS NEEDED
Status: DISCONTINUED | OUTPATIENT
Start: 2023-11-06 | End: 2023-11-09 | Stop reason: HOSPADM

## 2023-11-06 RX ORDER — AMOXICILLIN 250 MG
2 CAPSULE ORAL 2 TIMES DAILY
Status: DISCONTINUED | OUTPATIENT
Start: 2023-11-06 | End: 2023-11-09 | Stop reason: HOSPADM

## 2023-11-06 RX ORDER — ENOXAPARIN SODIUM 100 MG/ML
40 INJECTION SUBCUTANEOUS NIGHTLY
Status: DISCONTINUED | OUTPATIENT
Start: 2023-11-07 | End: 2023-11-07

## 2023-11-06 RX ORDER — CARVEDILOL 6.25 MG/1
12.5 TABLET ORAL 2 TIMES DAILY WITH MEALS
Status: DISCONTINUED | OUTPATIENT
Start: 2023-11-06 | End: 2023-11-09 | Stop reason: HOSPADM

## 2023-11-06 RX ORDER — PANTOPRAZOLE SODIUM 40 MG/1
40 TABLET, DELAYED RELEASE ORAL
Status: DISCONTINUED | OUTPATIENT
Start: 2023-11-06 | End: 2023-11-09 | Stop reason: HOSPADM

## 2023-11-06 RX ORDER — ACETAMINOPHEN 325 MG/1
650 TABLET ORAL EVERY 6 HOURS PRN
COMMUNITY

## 2023-11-06 RX ORDER — SODIUM CHLORIDE 0.9 % (FLUSH) 0.9 %
10 SYRINGE (ML) INJECTION EVERY 12 HOURS SCHEDULED
Status: DISCONTINUED | OUTPATIENT
Start: 2023-11-06 | End: 2023-11-09 | Stop reason: HOSPADM

## 2023-11-06 RX ORDER — ASPIRIN 81 MG/1
81 TABLET, CHEWABLE ORAL DAILY
Status: DISCONTINUED | OUTPATIENT
Start: 2023-11-06 | End: 2023-11-06

## 2023-11-06 RX ORDER — SACCHAROMYCES BOULARDII 250 MG
500 CAPSULE ORAL 2 TIMES DAILY
Status: DISCONTINUED | OUTPATIENT
Start: 2023-11-06 | End: 2023-11-09 | Stop reason: HOSPADM

## 2023-11-06 RX ORDER — POLYETHYLENE GLYCOL 3350 17 G/17G
17 POWDER, FOR SOLUTION ORAL DAILY PRN
Status: DISCONTINUED | OUTPATIENT
Start: 2023-11-06 | End: 2023-11-06 | Stop reason: SDUPTHER

## 2023-11-06 RX ORDER — ONDANSETRON 2 MG/ML
4 INJECTION INTRAMUSCULAR; INTRAVENOUS EVERY 6 HOURS PRN
Status: DISCONTINUED | OUTPATIENT
Start: 2023-11-06 | End: 2023-11-09 | Stop reason: HOSPADM

## 2023-11-06 RX ORDER — LORAZEPAM 0.5 MG/1
0.5 TABLET ORAL EVERY 8 HOURS PRN
Status: DISCONTINUED | OUTPATIENT
Start: 2023-11-06 | End: 2023-11-09 | Stop reason: HOSPADM

## 2023-11-06 RX ORDER — BISACODYL 5 MG/1
5 TABLET, DELAYED RELEASE ORAL DAILY PRN
Status: DISCONTINUED | OUTPATIENT
Start: 2023-11-06 | End: 2023-11-09 | Stop reason: HOSPADM

## 2023-11-06 RX ORDER — VENLAFAXINE 50 MG/1
25 TABLET ORAL DAILY
Status: DISCONTINUED | OUTPATIENT
Start: 2023-11-06 | End: 2023-11-09 | Stop reason: HOSPADM

## 2023-11-06 RX ORDER — ENOXAPARIN SODIUM 100 MG/ML
40 INJECTION SUBCUTANEOUS
Status: DISCONTINUED | OUTPATIENT
Start: 2023-11-06 | End: 2023-11-06

## 2023-11-06 RX ORDER — IBUPROFEN 200 MG
600 TABLET ORAL DAILY
COMMUNITY
End: 2023-11-09 | Stop reason: HOSPADM

## 2023-11-06 RX ORDER — OXYCODONE HYDROCHLORIDE 15 MG/1
15 TABLET ORAL 4 TIMES DAILY
COMMUNITY
End: 2023-11-09 | Stop reason: HOSPADM

## 2023-11-06 RX ORDER — LEVOTHYROXINE SODIUM 0.05 MG/1
50 TABLET ORAL
Status: DISCONTINUED | OUTPATIENT
Start: 2023-11-06 | End: 2023-11-09 | Stop reason: HOSPADM

## 2023-11-06 RX ORDER — NITROGLYCERIN 0.4 MG/1
0.4 TABLET SUBLINGUAL
Status: DISCONTINUED | OUTPATIENT
Start: 2023-11-06 | End: 2023-11-09 | Stop reason: HOSPADM

## 2023-11-06 RX ORDER — ONDANSETRON 4 MG/1
4 TABLET, FILM COATED ORAL EVERY 6 HOURS PRN
Status: DISCONTINUED | OUTPATIENT
Start: 2023-11-06 | End: 2023-11-09 | Stop reason: HOSPADM

## 2023-11-06 RX ORDER — POTASSIUM CHLORIDE 750 MG/1
40 CAPSULE, EXTENDED RELEASE ORAL EVERY 4 HOURS
Status: COMPLETED | OUTPATIENT
Start: 2023-11-06 | End: 2023-11-06

## 2023-11-06 RX ORDER — POLYETHYLENE GLYCOL 3350 17 G/17G
17 POWDER, FOR SOLUTION ORAL DAILY PRN
Status: DISCONTINUED | OUTPATIENT
Start: 2023-11-06 | End: 2023-11-09 | Stop reason: HOSPADM

## 2023-11-06 RX ORDER — CYCLOBENZAPRINE HCL 10 MG
10 TABLET ORAL 3 TIMES DAILY PRN
Status: DISCONTINUED | OUTPATIENT
Start: 2023-11-06 | End: 2023-11-09 | Stop reason: HOSPADM

## 2023-11-06 RX ORDER — SODIUM CHLORIDE 9 MG/ML
40 INJECTION, SOLUTION INTRAVENOUS AS NEEDED
Status: DISCONTINUED | OUTPATIENT
Start: 2023-11-06 | End: 2023-11-09 | Stop reason: HOSPADM

## 2023-11-06 RX ORDER — SODIUM CHLORIDE, SODIUM LACTATE, POTASSIUM CHLORIDE, CALCIUM CHLORIDE 600; 310; 30; 20 MG/100ML; MG/100ML; MG/100ML; MG/100ML
125 INJECTION, SOLUTION INTRAVENOUS CONTINUOUS
Status: DISPENSED | OUTPATIENT
Start: 2023-11-06 | End: 2023-11-06

## 2023-11-06 RX ORDER — POTASSIUM CHLORIDE 750 MG/1
40 CAPSULE, EXTENDED RELEASE ORAL EVERY 4 HOURS
Status: COMPLETED | OUTPATIENT
Start: 2023-11-06 | End: 2023-11-07

## 2023-11-06 RX ADMIN — BUTALBITAL, ACETAMINOPHEN, AND CAFFEINE 1 TABLET: 50; 325; 40 TABLET ORAL at 16:26

## 2023-11-06 RX ADMIN — PIPERACILLIN SODIUM AND TAZOBACTAM SODIUM 3.38 G: 3; .375 INJECTION, SOLUTION INTRAVENOUS at 17:32

## 2023-11-06 RX ADMIN — CARVEDILOL 12.5 MG: 6.25 TABLET, FILM COATED ORAL at 09:02

## 2023-11-06 RX ADMIN — POTASSIUM CHLORIDE 40 MEQ: 750 CAPSULE, EXTENDED RELEASE ORAL at 04:40

## 2023-11-06 RX ADMIN — SODIUM CHLORIDE, POTASSIUM CHLORIDE, SODIUM LACTATE AND CALCIUM CHLORIDE 125 ML/HR: 600; 310; 30; 20 INJECTION, SOLUTION INTRAVENOUS at 04:39

## 2023-11-06 RX ADMIN — POTASSIUM CHLORIDE 40 MEQ: 750 CAPSULE, EXTENDED RELEASE ORAL at 09:05

## 2023-11-06 RX ADMIN — PHENAZOPYRIDINE HYDROCHLORIDE 100 MG: 100 TABLET ORAL at 21:07

## 2023-11-06 RX ADMIN — SERTRALINE HYDROCHLORIDE 25 MG: 50 TABLET, FILM COATED ORAL at 09:04

## 2023-11-06 RX ADMIN — POTASSIUM CHLORIDE 40 MEQ: 750 CAPSULE, EXTENDED RELEASE ORAL at 21:10

## 2023-11-06 RX ADMIN — Medication 500 MG: at 09:05

## 2023-11-06 RX ADMIN — Medication 500 MG: at 21:07

## 2023-11-06 RX ADMIN — LEVOTHYROXINE SODIUM 50 MCG: 50 TABLET ORAL at 05:12

## 2023-11-06 RX ADMIN — PIPERACILLIN SODIUM AND TAZOBACTAM SODIUM 3.38 G: 3; .375 INJECTION, SOLUTION INTRAVENOUS at 10:18

## 2023-11-06 RX ADMIN — PANTOPRAZOLE SODIUM 40 MG: 40 TABLET, DELAYED RELEASE ORAL at 06:30

## 2023-11-06 RX ADMIN — ASPIRIN 81 MG: 81 TABLET, CHEWABLE ORAL at 09:05

## 2023-11-06 RX ADMIN — PIPERACILLIN SODIUM AND TAZOBACTAM SODIUM 3.38 G: 3; .375 INJECTION, SOLUTION INTRAVENOUS at 04:39

## 2023-11-06 RX ADMIN — DOCUSATE SODIUM 50 MG AND SENNOSIDES 8.6 MG 2 TABLET: 8.6; 5 TABLET, FILM COATED ORAL at 21:07

## 2023-11-06 RX ADMIN — Medication 10 ML: at 21:08

## 2023-11-06 RX ADMIN — ATORVASTATIN CALCIUM 40 MG: 40 TABLET ORAL at 09:05

## 2023-11-06 RX ADMIN — SIMETHICONE 80 MG: 80 TABLET, CHEWABLE ORAL at 05:11

## 2023-11-06 RX ADMIN — LORAZEPAM 0.5 MG: 0.5 TABLET ORAL at 21:07

## 2023-11-06 RX ADMIN — DONEPEZIL HYDROCHLORIDE 10 MG: 10 TABLET, FILM COATED ORAL at 21:07

## 2023-11-06 RX ADMIN — VENLAFAXINE 25 MG: 50 TABLET ORAL at 09:04

## 2023-11-06 RX ADMIN — ENOXAPARIN SODIUM 40 MG: 100 INJECTION SUBCUTANEOUS at 06:30

## 2023-11-06 NOTE — PLAN OF CARE
Goal Outcome Evaluation:              Outcome Evaluation: NTN assessment complete. Pt denies food allergies, chewing/swallowing difficulty, N/V/D, constipation, poor appetite. PO 25% breakfast this morning, 360 mL oral fluid (admit to present total). Nutrition appropriate for condition at this time. NTN following while inpatient.

## 2023-11-06 NOTE — ED NOTES
Nursing report ED to floor  Jennifer Gomes  83 y.o.  female    HPI:   Chief Complaint   Patient presents with    Fever       Admitting doctor:   Dru Bowie MD    Consulting provider(s):  Consults       No orders found from 10/7/2023 to 11/6/2023.             Admitting diagnosis:   The primary encounter diagnosis was Acute cystitis with hematuria. Diagnoses of Metabolic encephalopathy, Hypokalemia, and Hypomagnesemia were also pertinent to this visit.    Code status:   Current Code Status       Date Active Code Status Order ID Comments User Context       Prior            Allergies:   Ropinirole hcl, Codeine, Definity [perflutren lipid microsphere], Ambien [zolpidem], Eszopiclone, Pregabalin, and Tizanidine    Intake and Output    Intake/Output Summary (Last 24 hours) at 11/5/2023 2228  Last data filed at 11/5/2023 1845  Gross per 24 hour   Intake 600 ml   Output --   Net 600 ml       Weight:       11/05/23  1659   Weight: 77.6 kg (171 lb)       Most recent vitals:   Vitals:    11/05/23 1934 11/05/23 2030 11/05/23 2131 11/05/23 2226   BP: 143/62 156/67 164/64 153/78   Pulse: 98 93 93 78   Resp: 20  20 (!) 29   Temp:    98 °F (36.7 °C)   TempSrc:       SpO2: 98% (!) 88% 94% 95%   Weight:       Height:         Oxygen Therapy: .    Active LDAs/IV Access:   Lines, Drains & Airways       Active LDAs       Name Placement date Placement time Site Days    Peripheral IV 11/05/23 1636 Left;Posterior Hand 11/05/23  1636  Hand  less than 1    External Urinary Catheter 11/05/23 1948  --  less than 1                    Labs (abnormal labs have a star):   Labs Reviewed   COMPREHENSIVE METABOLIC PANEL - Abnormal; Notable for the following components:       Result Value    Glucose 127 (*)     Potassium 2.7 (*)     Albumin 2.4 (*)     BUN/Creatinine Ratio 25.6 (*)     All other components within normal limits    Narrative:     GFR Normal >60  Chronic Kidney Disease <60  Kidney Failure <15    The GFR formula is only valid for  adults with stable renal function between ages 18 and 70.   LACTIC ACID, PLASMA - Abnormal; Notable for the following components:    Lactate 2.9 (*)     All other components within normal limits   URINALYSIS W/ CULTURE IF INDICATED - Abnormal; Notable for the following components:    Appearance, UA Turbid (*)     Blood, UA Small (1+) (*)     Protein, UA 30 mg/dL (1+) (*)     Leuk Esterase, UA Large (3+) (*)     Nitrite, UA Positive (*)     All other components within normal limits    Narrative:     In absence of clinical symptoms, the presence of pyuria, bacteria, and/or nitrites on the urinalysis result does not correlate with infection.   CBC WITH AUTO DIFFERENTIAL - Abnormal; Notable for the following components:    RBC 2.60 (*)     Hemoglobin 7.7 (*)     Hematocrit 25.2 (*)     MCHC 30.6 (*)     Neutrophil % 84.5 (*)     Lymphocyte % 7.7 (*)     Eosinophil % 0.0 (*)     Immature Grans % 0.9 (*)     Neutrophils, Absolute 8.54 (*)     Immature Grans, Absolute 0.09 (*)     All other components within normal limits   URINALYSIS, MICROSCOPIC ONLY - Abnormal; Notable for the following components:    RBC, UA 3-5 (*)     WBC, UA Too Numerous to Count (*)     Bacteria, UA 4+ (*)     All other components within normal limits   MAGNESIUM - Abnormal; Notable for the following components:    Magnesium 1.4 (*)     All other components within normal limits   BLOOD GAS, ARTERIAL - Abnormal; Notable for the following components:    pH, Arterial 7.550 (*)     pCO2, Arterial 28.9 (*)     Base Excess, Arterial 2.8 (*)     O2 Saturation, Arterial 99.4 (*)     pCO2, Temperature Corrected 28.9 (*)     pH, Temp Corrected 7.550 (*)     All other components within normal limits   LIPASE - Normal   LACTIC ACID, REFLEX - Normal   BLOOD CULTURE   BLOOD CULTURE   URINE CULTURE   BLOOD GAS, ARTERIAL   OCCULT BLOOD X 1, STOOL   CBC AND DIFFERENTIAL    Narrative:     The following orders were created for panel order CBC &  Differential.  Procedure                               Abnormality         Status                     ---------                               -----------         ------                     CBC Auto Differential[[id number]]        Abnormal            Final result                 Please view results for these tests on the individual orders.       Meds given in ED:   Medications   lactated ringers bolus 500 mL (0 mL Intravenous Stopped 11/5/23 1811)   piperacillin-tazobactam (ZOSYN) IVPB 3.375 g in 100 mL NS (CD) (0 g Intravenous Stopped 11/5/23 1845)   potassium chloride 10 mEq in 100 mL IVPB (0 mEq Intravenous Stopped 11/5/23 2035)   iopamidol (ISOVUE-300) 61 % injection 100 mL (100 mL Intravenous Given 11/5/23 1743)   magnesium sulfate 2g/50 mL (PREMIX) infusion (0 g Intravenous Stopped 11/5/23 2035)     No current facility-administered medications for this encounter.       NIH Stroke Scale:       Isolation/Infection(s):  No active isolations   No active infections     COVID Testing  Collected .  Resulted .    Nursing report ED to floor:  Mental status: .  Ambulatory status: .  Precautions: .    ED nurse phone extentsion- ..

## 2023-11-06 NOTE — PLAN OF CARE
Goal Outcome Evaluation:  Plan of Care Reviewed With: patient        Progress: improving  Outcome Evaluation: IV ANTIBIOTICS PER ORDER. PT. CONT. TO C/O LOWER ABDOMINAL PAIN. PRN MED X 1 GIVEN PER PT. REQUEST. ROOM AIR. PURE WIK IN PLACE. PT. INCONTINENT OF STOOL. ORAL K+ GIVEN PER ORDER.  NO DISTRESS NOTED.

## 2023-11-06 NOTE — H&P
[facility] Medicine Services  HISTORY AND PHYSICAL    Date of Admission: 2023  Primary Care Physician: Olivia Mora APRN Subjective   Primary Historian: Pt and/or family    Chief Complaint: see H&P    History and Physical:  83-year-old female with a past medical history of GERD, hypertension, recurrent UTI   Presents with altered mental status, fever at home apparently she was being treated for UTI and failed outpatient cefdinir therapy.        ROS:   Otherwise complete ROS reviewed and negative except as mentioned in the HPI.    Past Medical History:   Past Medical History:   Diagnosis Date    Anxiety     Arthritis     Bronchitis     Cancer     uterine    Chronic pain     Depression     Disease of thyroid gland     Fibromyalgia     GERD (gastroesophageal reflux disease)     Headache     History of transfusion     AS     Hyperlipidemia     Hypertension     Incontinence     Insomnia     Leg pain     Lumbar stenosis     Migraines     Peptic ulcer     Restless legs     Sleep apnea     NO C-PAP    UTI (urinary tract infection)     Vaginal bleeding      Past Surgical History:  Past Surgical History:   Procedure Laterality Date    BLADDER REPAIR      MESH HAD TO BE REMOVED IN 2013    BREAST BIOPSY Right 2017    benign    BREAST CYST EXCISION Left     CARDIAC CATHETERIZATION      CARPAL TUNNEL RELEASE      CATARACT EXTRACTION W/ INTRAOCULAR LENS  IMPLANT, BILATERAL      CHOLECYSTECTOMY WITH INTRAOPERATIVE CHOLANGIOGRAM N/A 2023    Procedure: CHOLECYSTECTOMY LAPAROSCOPIC INTRAOPERATIVE CHOLANGIOGRAM;  Surgeon: Gerardo Arciniega MD;  Location: [facility] OR;  Service: General;  Laterality: N/A;    COLONOSCOPY      COLONOSCOPY N/A 10/01/2021    Procedure: COLONOSCOPY WITH ANESTHESIA;  Surgeon: Tom Velasco DO;  Location: [facility] ENDOSCOPY;  Service: Gastroenterology;  Laterality: N/A;  pre: change in bowel habits  post: diverticulosis. hemorrhoids.    Olivia Mora, MORGAN        CYSTECTOMY      D & C HYSTEROSCOPY N/A 11/06/2017    Procedure: DILATATION AND CURETTAGE HYSTEROSCOPY;  Surgeon: Shasta Madrigal MD;  Location: [facility] OR;  Service:     DILATION AND CURETTAGE, DIAGNOSTIC / THERAPEUTIC  2008    ENDOSCOPY  09/23/2010    Short segment of Arriola's,Moderate chroninc esophagogastritis and negative H.pylori    ENDOSCOPY N/A 09/25/2017    Procedure: ESOPHAGOGASTRODUODENOSCOPY WITH ANESTHESIA;  Surgeon: Tom Velasco DO;  Location: [facility] ENDOSCOPY;  Service:     EYE SURGERY      RETINA    HEMORRHOIDECTOMY SIGMOIDOSCOPY N/A 3/21/2023    Procedure: HEMORRHOIDECTOMY WITH EXAM UNDER ANESTHESIA;  Surgeon: Holly Chavez MD;  Location: [facility] OR;  Service: General;  Laterality: N/A;    HYSTERECTOMY  12/20/2017    ORIF TIBIA/FIBULA FRACTURES Left 2000    TRANSVAGINAL TAPING SUSPENSION N/A 11/06/2017    Procedure: VAGINAL MESH REVISION;  Surgeon: Shasta Madrgial MD;  Location: [facility] OR;  Service:     VAGINAL MESH REVISION  2013     Social History:  reports that she has never smoked. She has never used smokeless tobacco. She reports that she does not currently use alcohol. She reports that she does not use drugs.    Family History: family history includes Cancer in her paternal grandmother; Diabetes in her mother and sister; Lung cancer in her paternal grandfather; Lymphoma in her brother; Multiple myeloma in her mother; Ovarian cancer in her paternal aunt; Prostate cancer in her brother; Stroke in her father.       Allergies:  Allergies   Allergen Reactions    Ropinirole Hcl Shortness Of Breath    Codeine Itching and Mental Status Change    Definity [Perflutren Lipid Microsphere] Other (See Comments)     Severe back pain    Ambien [Zolpidem] Other (See Comments)     HYPER     Eszopiclone Other (See Comments)     MAKES PT HYPER     Pregabalin Dizziness    Tizanidine Other (See Comments)     Terrible nightmares       Medications:  Prior to Admission  medications    Medication Sig Start Date End Date Taking? Authorizing Provider   atorvastatin (LIPITOR) 40 MG tablet Take 1 tablet by mouth Daily. 1/16/23  Yes Tamiko Gaviria MD   butalbital-acetaminophen-caffeine (FIORICET, ESGIC) -40 MG per tablet Take 1 tablet by mouth 2 (Two) Times a Day As Needed for Headache. 9/25/23  Yes Ana Saldivar APRN   carvedilol (COREG) 12.5 MG tablet Take 1 tablet by mouth 2 (Two) Times a Day With Meals. 8/28/19  Yes Tamiko Gaviria MD   cyclobenzaprine (FLEXERIL) 10 MG tablet Take 1 tablet by mouth 3 (Three) Times a Day As Needed for Muscle Spasms. 11/17/22  Yes Tamiko Gaviria MD   donepezil (ARICEPT) 10 MG tablet Take 1 tablet by mouth Every Night. 10/18/22  Yes Tamiko Gaviria MD   ergocalciferol (ERGOCALCIFEROL) 75662 units capsule Take 1 capsule by mouth 1 (One) Time Per Week. Saturday 4/17/19  Yes Tamiko Gaviria MD   ipratropium (ATROVENT) 0.06 % nasal spray 2 sprays into the nostril(s) as directed by provider 2 (Two) Times a Day. 11/23/22  Yes Tamiko Gaviria MD   LACTASE ENZYME PO Take 3 tablets by mouth As Needed (takes before dairy products).   Yes Tamiko Gaviria MD   lansoprazole (PREVACID) 30 MG capsule Take 1 capsule by mouth Every Morning. 3/19/20  Yes Tamiko Gaviria MD   levothyroxine (SYNTHROID, LEVOTHROID) 50 MCG tablet Take 1 tablet by mouth Every Morning.   Yes Tamiko Gaviria MD   oxyCODONE (ROXICODONE) 15 MG immediate release tablet Take 1 tablet by mouth Every 12 (Twelve) Hours As Needed for Moderate Pain. 9/25/23  Yes Ana Saldivar APRN   sertraline (ZOLOFT) 25 MG tablet Take 1 tablet by mouth Daily. 2/28/23 2/28/24 Yes Shasta Madrigal MD   SUMAtriptan (IMITREX) 50 MG tablet Take 1 tablet by mouth 2 (Two) Times a Day As Needed for Migraine. 7/29/19  Yes Shasta Madrigal MD   venlafaxine (EFFEXOR) 25 MG tablet Take 1 tablet by mouth Daily. 2/28/23  Yes Shasta Madrigal MD   aspirin 81 MG chewable tablet  "Chew 1 tablet Daily.    Tamiko Gaviria MD   polyethylene glycol (MIRALAX) 17 g packet Take 17 g by mouth Daily As Needed (Constipation).    Tamiko Gaviria MD   solifenacin (VESICARE) 10 MG tablet Take 1 tablet by mouth Daily. 2/27/23 2/28/24  Tamiko Gaviria MD     I have utilized all available immediate resources to obtain, update, or review the patient's current medications (including all prescriptions, over-the-counter products, herbals, cannabis/cannabidiol products, and vitamin/mineral/dietary (nutritional) supplements).    Objective     Vital Signs: /82 (BP Location: Left arm, Patient Position: Lying)   Pulse 102   Temp 98.5 °F (36.9 °C) (Oral)   Resp 20   Ht 157.5 cm (62\")   Wt 74 kg (163 lb 2.3 oz)   LMP  (LMP Unknown)   SpO2 97%   BMI 29.84 kg/m²   Physical exam:  General: No acute distress  HEENT: normocephalic, atraumatic  Neck: Supple  CV: RRR  Lung: Clear to auscultation bilaterally.  No wheeze, rales, or rhonchi noted.  Abdominal exam: Positive bowel sounds, nontender, non distended  Extremity: No edema noted  Neurological exam: AAO x3. Cranial nerve II to XII grossly intact  Skin exam: Dry and warm  Psychiatric exam: Calm, cooperative, and normal affect         Results Reviewed:  Lab Results (last 24 hours)       Procedure Component Value Units Date/Time    STAT Lactic Acid, Reflex [[id number]]  (Normal) Collected: 11/05/23 1938    Specimen: Blood Updated: 11/05/23 2008     Lactate 0.7 mmol/L     Blood Gas, Arterial - [[id number]]  (Abnormal) Collected: 11/05/23 1803    Specimen: Arterial Blood Updated: 11/05/23 1803     Site Right Radial     Donny's Test Positive     pH, Arterial 7.550 pH units      Comment: 83 Value above reference range        pCO2, Arterial 28.9 mm Hg      Comment: 84 Value below reference range        pO2, Arterial 106.0 mm Hg      HCO3, Arterial 25.3 mmol/L      Base Excess, Arterial 2.8 mmol/L      Comment: 83 Value above reference range        " O2 Saturation, Arterial 99.4 %      Comment: 83 Value above reference range        Temperature 37.0 C      Barometric Pressure for Blood Gas 752 mmHg      Modality Nasal Cannula     Flow Rate 2.0 lpm      Ventilator Mode NA     Collected by [id number]     Comment: Meter: [id number]     :  [id number]        pCO2, Temperature Corrected 28.9 mm Hg      pH, Temp Corrected 7.550 pH Units      pO2, Temperature Corrected 106 mm Hg     Magnesium [[id number]]  (Abnormal) Collected: 11/05/23 1636    Specimen: Blood Updated: 11/05/23 1736     Magnesium 1.4 mg/dL     Urinalysis, Microscopic Only - Straight Cath [[id number]]  (Abnormal) Collected: 11/05/23 1651    Specimen: Urine from Straight Cath Updated: 11/05/23 1723     RBC, UA 3-5 /HPF      WBC, UA Too Numerous to Count /HPF      Bacteria, UA 4+ /HPF      Squamous Epithelial Cells, UA 0-2 /HPF      Yeast, UA Small/1+ Budding Yeast /HPF      Hyaline Casts, UA None Seen /LPF      WBC Clumps, UA Small/1+ /HPF      Methodology Manual Light Microscopy    Urine Culture - Urine, Straight Cath [[id number]] Collected: 11/05/23 1651    Specimen: Urine from Straight Cath Updated: 11/05/23 1723    Urinalysis With Culture If Indicated - Straight Cath [[id number]]  (Abnormal) Collected: 11/05/23 1651    Specimen: Urine from Straight Cath Updated: 11/05/23 1717     Color, UA Yellow     Appearance, UA Turbid     pH, UA 6.5     Specific Gravity, UA 1.017     Glucose, UA Negative     Ketones, UA Negative     Bilirubin, UA Negative     Blood, UA Small (1+)     Protein, UA 30 mg/dL (1+)     Leuk Esterase, UA Large (3+)     Nitrite, UA Positive     Urobilinogen, UA 1.0 E.U./dL    Narrative:      In absence of clinical symptoms, the presence of pyuria, bacteria, and/or nitrites on the urinalysis result does not correlate with infection.    Lactic Acid, Plasma [[id number]]  (Abnormal) Collected: 11/05/23 1636    Specimen: Blood Updated: 11/05/23 1709     Lactate 2.9 mmol/L     Comprehensive  Metabolic Panel [[id number]]  (Abnormal) Collected: 11/05/23 1636    Specimen: Blood Updated: 11/05/23 1707     Glucose 127 mg/dL      BUN 21 mg/dL      Creatinine 0.82 mg/dL      Sodium 140 mmol/L      Potassium 2.7 mmol/L      Chloride 105 mmol/L      CO2 24.0 mmol/L      Calcium 9.0 mg/dL      Total Protein 6.0 g/dL      Albumin 2.4 g/dL      ALT (SGPT) 7 U/L      AST (SGOT) 12 U/L      Alkaline Phosphatase 79 U/L      Total Bilirubin 0.6 mg/dL      Globulin 3.6 gm/dL      A/G Ratio 0.7 g/dL      BUN/Creatinine Ratio 25.6     Anion Gap 11.0 mmol/L      eGFR 72.0 mL/min/1.73     Narrative:      GFR Normal >60  Chronic Kidney Disease <60  Kidney Failure <15    The GFR formula is only valid for adults with stable renal function between ages 18 and 70.    Lipase [[id number]]  (Normal) Collected: 11/05/23 1636    Specimen: Blood Updated: 11/05/23 1702     Lipase 25 U/L     CBC & Differential [[id number]]  (Abnormal) Collected: 11/05/23 1636    Specimen: Blood Updated: 11/05/23 1649    Narrative:      The following orders were created for panel order CBC & Differential.  Procedure                               Abnormality         Status                     ---------                               -----------         ------                     CBC Auto Differential[[id number]]        Abnormal            Final result                 Please view results for these tests on the individual orders.    CBC Auto Differential [[id number]]  (Abnormal) Collected: 11/05/23 1636    Specimen: Blood Updated: 11/05/23 1649     WBC 10.11 10*3/mm3      RBC 2.60 10*6/mm3      Hemoglobin 7.7 g/dL      Hematocrit 25.2 %      MCV 96.9 fL      MCH 29.6 pg      MCHC 30.6 g/dL      RDW 15.0 %      RDW-SD 53.2 fl      MPV 10.2 fL      Platelets 217 10*3/mm3      Neutrophil % 84.5 %      Lymphocyte % 7.7 %      Monocyte % 6.7 %      Eosinophil % 0.0 %      Basophil % 0.2 %      Immature Grans % 0.9 %      Neutrophils, Absolute 8.54 10*3/mm3       Lymphocytes, Absolute 0.78 10*3/mm3      Monocytes, Absolute 0.68 10*3/mm3      Eosinophils, Absolute 0.00 10*3/mm3      Basophils, Absolute 0.02 10*3/mm3      Immature Grans, Absolute 0.09 10*3/mm3      nRBC 0.0 /100 WBC     Blood Culture - Blood, Hand, Left [[id number]] Collected: 11/05/23 1630    Specimen: Blood from Hand, Left Updated: 11/05/23 1648    Blood Culture - Blood, Wrist, Left [[id number]] Collected: 11/05/23 1636    Specimen: Blood from Wrist, Left Updated: 11/05/23 1648          Imaging Results (Last 24 Hours)       Procedure Component Value Units Date/Time    CT Abdomen Pelvis With Contrast [[id number]] Collected: 11/05/23 1748     Updated: 11/05/23 1801    Narrative:      CT ABDOMEN PELVIS W CONTRAST- 11/5/2023 5:33 PM CST     HISTORY: Abdominal pain and hypotension      COMPARISON: 9/4/2023, 2/8/2022     DOSE LENGTH PRODUCT: 665 mGy cm. Automated exposure control was also  utilized to decrease patient radiation dose.     TECHNIQUE: Axial images of the abdomen and pelvis are performed  following IV contrast.     FINDINGS: There is no suspicious focal liver or splenic mass. Spleen  remains enlarged measuring 13.9 x 10.0 x 8.0 cm. Similar peripherally  calcified 1.5 cm lesion in the splenic hilum, unchanged from 2/8/2022,  favoring thrombosed distal splenic artery aneurysm. No peripancreatic  inflammation. Cholecystectomy clips. No adrenal nodule.     There is moderate right-sided hydronephrosis, only slightly increased  compared to the 9/4/2023 exam. The right ureter is distended to the  level of the pelvic inlet, the right distal ureter difficult to  localize. Calcified phleboliths are seen within the lower pelvis. No  enhancing renal mass. Bladder under distended. Uterus is absent.     There is moderate stool present throughout the colon. No small bowel  dilatation. No free air or abscess. Decompressed stomach. Very small  hiatal hernia. Small fatty containing umbilical and supraumbilical  hernia  defects. No herniation of the bowel.     Mild vascular calcification with no aneurysm. No pathologic  lymphadenopathy.     Visible lung bases are unremarkable.     No focal aggressive regional bony lesions. Grade 1 spondylolisthesis at  L4/5 due to facet arthropathy. No left renal collecting system  dilatation.       Impression:      Moderate stool seen throughout the colon with borderline wall thickening  of the rectum. Proctitis versus stercoral colitis considered. No free  air or abscess. No small bowel dilatation. Very small hiatal hernia.     2. Moderate right-sided hydronephrosis, only slightly increased compared  to 9/4/2023. Right ureter dilated to the level of the pelvic inlet with  diminished visualization of the right distal ureter. No discrete  obstructing stone. If this finding is unknown, a follow-up nonemergent  CT urography study could be performed for further evaluation following  24 hours after today's contrast administration.     3. Stable mild splenomegaly. Stable peripherally calcified 1.5 cm lesion  of the splenic hilum favoring thrombosed splenic artery aneurysm.     This report was signed and finalized on 11/5/2023 5:58 PM CST by Dr. Blanka Pak MD.       CT Head Without Contrast [[id number]] Collected: 11/05/23 1746     Updated: 11/05/23 1751    Narrative:      CT HEAD WO CONTRAST- 11/5/2023 5:33 PM CST     HISTORY: AMS      COMPARISON: 9/23/2023     DOSE LENGTH PRODUCT: 652 mGy cm. Automated exposure control was also  utilized to decrease patient radiation dose.     TECHNIQUE: Axial images of the brain obtained without contrast     FINDINGS: Similar prominence of the sulci and ventricles favoring  moderate cerebral volume loss. Chronic small vessel ischemic changes. No  intracranial hemorrhage or mass effect. No acute signs of ischemia. No  extra-axial hematoma or subarachnoid hemorrhage.     Chronic opacification of the right maxillary sinus with hyperostosis of  the right maxillary  antrum. Mucosal thickening again seen within the  visible left maxillary sinus. Bony calvarium appears intact.       Impression:      1. Stable exam. Moderate cerebral volume loss with chronic vessel  ischemia. No acute intracranial process. Chronic opacification of the  right greater than left maxillary sinuses.        This report was signed and finalized on 11/5/2023 5:48 PM CST by Dr. Blanka Pak MD.       XR Chest 1 View [[id number]] Collected: 11/05/23 1719     Updated: 11/05/23 1724    Narrative:      XR CHEST 1 VW-     HISTORY: Fever     COMPARISON: 9/23/2023     FINDINGS: Frontal view the chest obtained.     Similar elevation right hemidiaphragm. Increasing right basilar  opacities concerning for patchy atelectasis or pneumonia. Similar  prominence of the hilar stations. Stable mediastinal contours. No  pleural effusion or pneumothorax. No acute regional bony pathology.       Impression:      1. Chronic elevation right hemidiaphragm. Right basilar opacities may  relate to atelectasis versus pneumonia. Otherwise no acute process  identified.        This report was signed and finalized on 11/5/2023 5:21 PM CST by Dr. Blanka Pak MD.             I have personally reviewed and interpreted the radiology studies and ECG obtained at time of admission.     Assessment / Plan   Assessment:   Active Hospital Problems    Diagnosis     **UTI (urinary tract infection)      83-year-old female with a past medical history of GERD, hypertension, recurrent UTI   Presents with altered mental status, fever at home apparently she was being treated for UTI and failed outpatient cefdinir therapy.    UTI:  Zosyn  Urine culture pending      Dispo: Pending improvement in mental status    Dru Bowie MD, 11/06/23, 03:18 CST      Treatment Plan  The patient will be admitted to my service here at [facility].     Medical Decision Making  Number and Complexity of problems: see assessment and plan  Differential  Diagnosis: see assessment and plan    Conditions and Status             MDM Data  External documents reviewed: yes  Cardiac tracing (EKG, telemetry) interpretation: see cardiology read and/or assessment and plan  Radiology interpretation: see radiologist read  Labs reviewed: yes  Any tests that were considered but not ordered: n/a     Decision rules/scores evaluated (example [id number], Wells, etc): see assessment and plan     Discussed with: see assessment and plan     Care Planning  Shared decision making: see assessment and plan  Code status and discussions: see assessment and plan    Disposition  Social Determinants of Health that impact treatment or disposition: see dispo above.  Estimated length of stay is see dispo above.    I confirmed that the patient's advanced care plan is present, code status is documented, and a surrogate decision maker is listed in the patient's medical record.     The patient's surrogate decision maker is see chart    The patient was seen and examined by me is see admission order time.    Electronically signed by Dru Bowie MD, 11/06/23, 03:18 CST.

## 2023-11-06 NOTE — CASE MANAGEMENT/SOCIAL WORK
Discharge Planning Assessment  [facility]     Patient Name: Jennifer Gomes  MRN: [id number]  Today's Date: 11/6/2023    Admit Date: 11/5/2023        Discharge Needs Assessment       Row Name 11/06/23 1002       Living Environment    People in Home spouse    Name(s) of People in Home Adams Gomes - spouse    Current Living Arrangements home    In the past 12 months has the electric, gas, oil, or water company threatened to shut off services in your home? No    Primary Care Provided by self    Provides Primary Care For no one    Family Caregiver if Needed child(sebastian), adult;spouse    Family Caregiver Names Ivonne Silva - adult daughter    Quality of Family Relationships helpful;involved;supportive    Able to Return to Prior Arrangements yes       Resource/Environmental Concerns    Resource/Environmental Concerns none    Transportation Concerns none       Food Insecurity    Within the past 12 months, you worried that your food would run out before you got the money to buy more. Never true    Within the past 12 months, the food you bought just didn't last and you didn't have money to get more. Never true       Transition Planning    Patient/Family Anticipates Transition to home with family    Transportation Anticipated family or friend will provide       Discharge Needs Assessment    Readmission Within the Last 30 Days no previous admission in last 30 days    Equipment Currently Used at Home rollator;cane, straight;commode    Concerns to be Addressed discharge planning;denies needs/concerns at this time    Anticipated Changes Related to Illness none    Discharge Coordination/Progress Pt lives with spouse and her daughter drives her places as needed and keeps a check on her and spouse.  Pt is fairly independent at home, has a PCP and RX coverage.  [facility]  (last D[facility] of 9/10/2023) but couldn't tell me if they were still coming or not.  Pt anticipates Dc home and currently denies any dc needs.  Will  continue to follow.                   Discharge Plan    No documentation.                 Continued Care and Services - Admitted Since 11/5/2023    Coordination has not been started for this encounter.       Selected Continued Care - Prior Encounters Includes continued care and service providers with selected services from prior encounters from 8/7/2023 to 11/6/2023      Discharged on 9/10/2023 Admission date: 9/4/2023 - Discharge disposition: Home-Health Care [facility]      Home Medical Care       Service Provider Selected Services Address Phone Fax Patient Preferred    [facility] ,  Home Rehabilitation [address] [place] [address] [address], [facility] [address] [phone] --                             Demographic Summary    No documentation.                  Functional Status    No documentation.                  Psychosocial    No documentation.                  Abuse/Neglect    No documentation.                  Legal    No documentation.                  Substance Abuse    No documentation.                  Patient Forms    No documentation.                     Marina Hodge RN

## 2023-11-07 LAB
ANION GAP SERPL CALCULATED.3IONS-SCNC: 6 MMOL/L (ref 5–15)
BASOPHILS # BLD AUTO: 0.01 10*3/MM3 (ref 0–0.2)
BASOPHILS NFR BLD AUTO: 0.1 % (ref 0–1.5)
BUN SERPL-MCNC: 17 MG/DL (ref 8–23)
BUN/CREAT SERPL: 20.7 (ref 7–25)
CALCIUM SPEC-SCNC: 8.1 MG/DL (ref 8.6–10.5)
CHLORIDE SERPL-SCNC: 106 MMOL/L (ref 98–107)
CO2 SERPL-SCNC: 26 MMOL/L (ref 22–29)
CREAT SERPL-MCNC: 0.82 MG/DL (ref 0.57–1)
DEPRECATED RDW RBC AUTO: 52.9 FL (ref 37–54)
EGFRCR SERPLBLD CKD-EPI 2021: 72 ML/MIN/1.73
EOSINOPHIL # BLD AUTO: 0.12 10*3/MM3 (ref 0–0.4)
EOSINOPHIL NFR BLD AUTO: 1.8 % (ref 0.3–6.2)
ERYTHROCYTE [DISTWIDTH] IN BLOOD BY AUTOMATED COUNT: 14.7 % (ref 12.3–15.4)
GLUCOSE SERPL-MCNC: 110 MG/DL (ref 65–99)
HCT VFR BLD AUTO: 22.7 % (ref 34–46.6)
HGB BLD-MCNC: 6.5 G/DL (ref 12–15.9)
IMM GRANULOCYTES # BLD AUTO: 0.03 10*3/MM3 (ref 0–0.05)
IMM GRANULOCYTES NFR BLD AUTO: 0.4 % (ref 0–0.5)
LYMPHOCYTES # BLD AUTO: 0.84 10*3/MM3 (ref 0.7–3.1)
LYMPHOCYTES NFR BLD AUTO: 12.5 % (ref 19.6–45.3)
MAGNESIUM SERPL-MCNC: 1.8 MG/DL (ref 1.6–2.4)
MCH RBC QN AUTO: 28.1 PG (ref 26.6–33)
MCHC RBC AUTO-ENTMCNC: 28.6 G/DL (ref 31.5–35.7)
MCV RBC AUTO: 98.3 FL (ref 79–97)
MONOCYTES # BLD AUTO: 0.46 10*3/MM3 (ref 0.1–0.9)
MONOCYTES NFR BLD AUTO: 6.8 % (ref 5–12)
NEUTROPHILS NFR BLD AUTO: 5.27 10*3/MM3 (ref 1.7–7)
NEUTROPHILS NFR BLD AUTO: 78.4 % (ref 42.7–76)
NRBC BLD AUTO-RTO: 0 /100 WBC (ref 0–0.2)
PLATELET # BLD AUTO: 190 10*3/MM3 (ref 140–450)
PMV BLD AUTO: 10.9 FL (ref 6–12)
POTASSIUM SERPL-SCNC: 3.7 MMOL/L (ref 3.5–5.2)
POTASSIUM SERPL-SCNC: 3.7 MMOL/L (ref 3.5–5.2)
RBC # BLD AUTO: 2.31 10*6/MM3 (ref 3.77–5.28)
SODIUM SERPL-SCNC: 138 MMOL/L (ref 136–145)
WBC NRBC COR # BLD: 6.73 10*3/MM3 (ref 3.4–10.8)

## 2023-11-07 PROCEDURE — 25010000002 AMPICILLIN-SULBACTAM PER 1.5 G: Performed by: INTERNAL MEDICINE

## 2023-11-07 PROCEDURE — 80048 BASIC METABOLIC PNL TOTAL CA: CPT | Performed by: INTERNAL MEDICINE

## 2023-11-07 PROCEDURE — 25010000002 PIPERACILLIN SOD-TAZOBACTAM PER 1 G: Performed by: INTERNAL MEDICINE

## 2023-11-07 PROCEDURE — 85025 COMPLETE CBC W/AUTO DIFF WBC: CPT | Performed by: INTERNAL MEDICINE

## 2023-11-07 PROCEDURE — 83735 ASSAY OF MAGNESIUM: CPT | Performed by: INTERNAL MEDICINE

## 2023-11-07 PROCEDURE — 84132 ASSAY OF SERUM POTASSIUM: CPT | Performed by: INTERNAL MEDICINE

## 2023-11-07 RX ADMIN — PANTOPRAZOLE SODIUM 40 MG: 40 TABLET, DELAYED RELEASE ORAL at 08:00

## 2023-11-07 RX ADMIN — VENLAFAXINE 25 MG: 50 TABLET ORAL at 08:07

## 2023-11-07 RX ADMIN — PIPERACILLIN SODIUM AND TAZOBACTAM SODIUM 3.38 G: 3; .375 INJECTION, SOLUTION INTRAVENOUS at 01:47

## 2023-11-07 RX ADMIN — Medication 500 MG: at 08:08

## 2023-11-07 RX ADMIN — SERTRALINE HYDROCHLORIDE 25 MG: 50 TABLET, FILM COATED ORAL at 08:08

## 2023-11-07 RX ADMIN — POTASSIUM CHLORIDE 40 MEQ: 750 CAPSULE, EXTENDED RELEASE ORAL at 01:47

## 2023-11-07 RX ADMIN — SODIUM CHLORIDE 3 G: 900 INJECTION INTRAVENOUS at 17:43

## 2023-11-07 RX ADMIN — PHENAZOPYRIDINE HYDROCHLORIDE 100 MG: 100 TABLET ORAL at 11:04

## 2023-11-07 RX ADMIN — Medication 10 ML: at 08:08

## 2023-11-07 RX ADMIN — BUTALBITAL, ACETAMINOPHEN, AND CAFFEINE 1 TABLET: 50; 325; 40 TABLET ORAL at 21:24

## 2023-11-07 RX ADMIN — Medication 500 MG: at 21:24

## 2023-11-07 RX ADMIN — Medication 10 ML: at 21:24

## 2023-11-07 RX ADMIN — PHENAZOPYRIDINE HYDROCHLORIDE 100 MG: 100 TABLET ORAL at 17:42

## 2023-11-07 RX ADMIN — PHENAZOPYRIDINE HYDROCHLORIDE 100 MG: 100 TABLET ORAL at 08:09

## 2023-11-07 RX ADMIN — ATORVASTATIN CALCIUM 40 MG: 40 TABLET ORAL at 08:08

## 2023-11-07 RX ADMIN — DONEPEZIL HYDROCHLORIDE 10 MG: 10 TABLET, FILM COATED ORAL at 21:24

## 2023-11-07 RX ADMIN — PIPERACILLIN SODIUM AND TAZOBACTAM SODIUM 3.38 G: 3; .375 INJECTION, SOLUTION INTRAVENOUS at 10:56

## 2023-11-07 RX ADMIN — LEVOTHYROXINE SODIUM 50 MCG: 50 TABLET ORAL at 05:20

## 2023-11-07 RX ADMIN — CARVEDILOL 12.5 MG: 6.25 TABLET, FILM COATED ORAL at 08:08

## 2023-11-07 NOTE — NURSING NOTE
Patient continues to refuse blood products at this time. Hgb results were discussed in depth with patient.  
10-06    136  |  103  |  16  ----------------------------<  163<H>  4.0   |  26  |  0.60    Ca    8.7      06 Oct 2023 05:45    TPro  6.0  /  Alb  1.9<L>  /  TBili  0.2  /  DBili  x   /  AST  24  /  ALT  28  /  AlkPhos  60  10-06  POCT Blood Glucose.: 235 mg/dL (10-06-23 @ 12:13)  A1C with Estimated Average Glucose Result: 8.0 % (09-20-23 @ 06:52)  A1C with Estimated Average Glucose Result: 7.3 % (08-28-23 @ 07:00)  A1C with Estimated Average Glucose Result: 7.2 % (08-27-23 @ 07:28)

## 2023-11-07 NOTE — PROGRESS NOTES
1         [facility] Medicine Services  INPATIENT PROGRESS NOTE    Patient Name: Jennifer Gomes  Date of Admission: 2023  Today's Date: 23  Length of Stay: 1  Primary Care Physician: Olivia Mora APRN    Subjective   Chief Complaint: Follow-up  HPI   83-year-old admitted overnight for urinary tract infection failed outpatient therapy.  Patient reportedly altered in mental status and had fever at home.    Patient has several comorbidities as outlined in past medical history    Past Medical History:   Medical History        Past Medical History:   Diagnosis Date    Anxiety      Arthritis      Bronchitis      Cancer       uterine    Chronic pain      Depression      Disease of thyroid gland      Fibromyalgia      GERD (gastroesophageal reflux disease)      Headache      History of transfusion       AS     Hyperlipidemia      Hypertension      Incontinence      Insomnia      Leg pain      Lumbar stenosis      Migraines      Peptic ulcer      Restless legs      Sleep apnea       NO C-PAP    UTI (urinary tract infection)      Vaginal bleeding         Diagnostic studies:  Earlier today hemoglobin was 6.7, hematocrit 32.1.  There has not been any order for transfusion at the time seen nor has it been reported to me  Potassium is 2.5  Phosphorus 2.4, magnesium 1.8      Patient received 80 mEq of potassium today.  Review of Systems   All pertinent negatives and positives are as above. All other systems have been reviewed and are negative unless otherwise stated.     Objective    Temp:  [97.6 °F (36.4 °C)-98.5 °F (36.9 °C)] 97.6 °F (36.4 °C)  Heart Rate:  [] 76  Resp:  [18-29] 18  BP: (115-164)/(44-82) 115/52  Physical Exam  Pale  No distress  GEN: Awake, alert, interactive, in NAD  HEENT: Atraumatic, PERRLA, EOMI, Anicteric, Trachea midline  Lungs: CTAB, no wheezing/rales/rhonchi  Heart: RRR, +S1/s2, no rub  ABD: soft, nt/nd, +BS, no  guarding/rebound  Extremities: atraumatic, no cyanosis, no edema  Skin: no rashes or lesions  Neuro: AAOx3, no focal deficits  Psych: normal mood & affect        Results Review:  I have reviewed the labs, radiology results, and diagnostic studies.    Laboratory Data:   Results from last 7 days   Lab Units 11/06/23  0354 11/05/23  1636   WBC 10*3/mm3 8.35 10.11   HEMOGLOBIN g/dL 6.7* 7.7*   HEMATOCRIT % 22.1* 25.2*   PLATELETS 10*3/mm3 168 217        Results from last 7 days   Lab Units 11/06/23  0354 11/05/23  1636   SODIUM mmol/L 141 140   POTASSIUM mmol/L 2.5* 2.7*   CHLORIDE mmol/L 106 105   CO2 mmol/L 26.0 24.0   BUN mg/dL 16 21   CREATININE mg/dL 0.71 0.82   CALCIUM mg/dL 8.5* 9.0   BILIRUBIN mg/dL  --  0.6   ALK PHOS U/L  --  79   ALT (SGPT) U/L  --  7   AST (SGOT) U/L  --  12   GLUCOSE mg/dL 114* 127*       Culture Data:   Blood Culture   Date Value Ref Range Status   11/05/2023 Abnormal Stain (C)  Preliminary   11/05/2023 No growth at 24 hours  Preliminary       Radiology Data:   Imaging Results (Last 24 Hours)       ** No results found for the last 24 hours. **            I have reviewed the patient's current medications.     Assessment/Plan   Assessment  Active Hospital Problems    Diagnosis     **UTI (urinary tract infection)            Medical Decision Making  Number and Complexity of problems:   Bacteremia-Enterococcus  Recent urinary tract infection reportedly failed outpatient therapy (cefdinir)  Anemia  Hypokalemia  Hypomagnesemia  Constipation-bowel regimen  Moderate right hydronephrosis  Hypothyroidism        Treatment Plan  Stat H&H and transfuse if hemoglobin less than 7  Type and screen  Stat basic metabolic panel to check potassium.  Electrolyte replacement protocol  Blood culture showed Enterococcus faecalis.  Patient on empiric antibiotic (Zosyn)-we will follow culture result.  Urine culture no growth to date    Monitor blood pressure.  Continue present management  Continue levothyroxine.  If  you have any other TSH done already  We will add Pyridium for hypogastric discomfort in the setting of urinary tract infection      Meds reviewed.  Continue present management  atorvastatin, 40 mg, Oral, Daily  carvedilol, 12.5 mg, Oral, BID With Meals  donepezil, 10 mg, Oral, Nightly  [START ON 11/7/2023] enoxaparin, 40 mg, Subcutaneous, Nightly  levothyroxine, 50 mcg, Oral, Q AM  pantoprazole, 40 mg, Oral, Q AM  piperacillin-tazobactam, 3.375 g, Intravenous, Q8H  saccharomyces boulardii, 500 mg, Oral, BID  senna-docusate sodium, 2 tablet, Oral, BID  sertraline, 25 mg, Oral, Daily  sodium chloride, 10 mL, Intravenous, Q12H  venlafaxine, 25 mg, Oral, Daily    Conditions and Status     Fair     [facility] Data  External documents reviewed: None  Cardiac tracing (EKG, telemetry) interpretation: -  Radiology interpretation: Yes  Labs reviewed: Yes  Any tests that were considered but not ordered: None     Decision rules/scores evaluated (example [id number], Wells, etc): None     Discussed with: Nurse, patient  I informed her off her hemoglobin at 6.7.  She consented to have it rechecked.  She did not endorse any bleeding  Patient for personal reason not related to Mu-ism does not want any blood transfusion.  Risks and benefits discussed.  We will check iron profile, ferritin.  We will look at substrate that we can replenish if needed     Care Planning  Shared decision making: Patient  Code status and discussions: Full code    Disposition  Social Determinants of Health that impact treatment or disposition: None identified at this time  I expect the patient to be discharged to (TBD)  Electronically signed by Jordan Cummins MD, 11/06/23, 18:56 CST.

## 2023-11-07 NOTE — PROGRESS NOTES
[facility] Medicine Services  INPATIENT PROGRESS NOTE    Patient Name: Jennifer Gomes  Date of Admission: 11/5/2023  Today's Date: 11/07/23  Length of Stay: 2  Primary Care Physician: Olivia Mora APRN    Subjective   Chief Complaint: Follow-up  HPI   Patient has had bowel movement.  Lower abdominal pain has improved significantly.  Not sure whether it is related to evacuation of stool versus effect of Pyridium    I reviewed prior records of colonoscopy and EGD.  From the colonoscopy, she had diverticulosis.  I could not recall the other reading.  EGD showed ulcer with no stigmata of bleeding.  I will revisit this once again    Explained to her about anemia, causes of anemia and reason why she should get blood transfusion.  She agreed but later changed her mind.  Review of Systems   All pertinent negatives and positives are as above. All other systems have been reviewed and are negative unless otherwise stated.     Objective    Temp:  [97.5 °F (36.4 °C)-98.2 °F (36.8 °C)] 97.5 °F (36.4 °C)  Heart Rate:  [75-84] 75  Resp:  [16-20] 16  BP: (115-149)/(40-66) 148/61  Physical Exam  Pale  No distress  GEN: Awake, alert, interactive, in NAD  HEENT: Atraumatic, PERRLA, EOMI, Anicteric, Trachea midline  Lungs: CTAB, no wheezing/rales/rhonchi  Heart: RRR, +S1/s2, no rub  ABD: soft, nt/nd, +BS, no guarding/rebound  Extremities: atraumatic, no cyanosis, no edema  Skin: no rashes or lesions  Neuro: AAOx3, no focal deficits  Psych: normal mood & affect         Results Review:  I have reviewed the labs, radiology results, and diagnostic studies.    Laboratory Data:   Results from last 7 days   Lab Units 11/07/23  0333 11/06/23  1925 11/06/23  0354 11/05/23  1636   WBC 10*3/mm3 6.73  --  8.35 10.11   HEMOGLOBIN g/dL 6.5* 6.6* 6.7* 7.7*   HEMATOCRIT % 22.7* 22.2* 22.1* 25.2*   PLATELETS 10*3/mm3 190  --  168 217        Results from last 7 days   Lab Units 11/07/23  0333 11/06/23  1926  11/06/23  0354 11/05/23  1636   SODIUM mmol/L 138 141 141 140   POTASSIUM mmol/L 3.7  3.7 3.3* 2.5* 2.7*   CHLORIDE mmol/L 106 108* 106 105   CO2 mmol/L 26.0 27.0 26.0 24.0   BUN mg/dL 17 19 16 21   CREATININE mg/dL 0.82 0.79 0.71 0.82   CALCIUM mg/dL 8.1* 8.4* 8.5* 9.0   BILIRUBIN mg/dL  --   --   --  0.6   ALK PHOS U/L  --   --   --  79   ALT (SGPT) U/L  --   --   --  7   AST (SGOT) U/L  --   --   --  12   GLUCOSE mg/dL 110* 153* 114* 127*       Culture Data:   Blood Culture   Date Value Ref Range Status   11/05/2023 Gram Positive Cocci (C)  Preliminary   11/05/2023 No growth at 24 hours  Preliminary       Radiology Data:   Imaging Results (Last 24 Hours)       ** No results found for the last 24 hours. **            I have reviewed the patient's current medications.     Assessment/Plan   Assessment  Active Hospital Problems    Diagnosis     **UTI (urinary tract infection)            Medical Decision Making  Number and Complexity of problems:     Bacteremia-Enterococcus  Recent urinary tract infection reportedly failed outpatient therapy (cefdinir)  Anemia-limit phlebotomy; patient initially agreed for transfusion but later declined.  Ferritin noted to be elevated could be an acute phase reactant due to current infectious process versus under utilization of iron store.  Patient's iron profile suggests anemia of chronic disease.    Hypokalemia-resolved  Hypomagnesemia  Constipation-bowel regimen-proctitis versus stercoral colitis considered.  Moderate right hydronephrosis  Hypothyroidism  Abnormal CT-mentioned to have moderate right-sided hydronephrosis slightly increased compared to September 4.  We will have to review old record if urology been consulted at some point.  Otherwise, renal function is stable.  Dementia likely affecting her decision when initially she agreed for blood transfusion but may have forgotten that she agreed if needed    Treatment Plan  Retacrit started  Limit phlebotomy to avoid further  worsening of anemia.  Patient refused blood transfusion for personal reason other than Orthodoxy belief.  Continue empiric antibiotic pending culture.  Patient has Enterococcus faecalis bacteremia from PCR identification.    Antibiogram indicates susceptibility to ampicillin.  I think it is reasonable to switch to ampicillin but I choose Unasyn given finding of stercoral colitis on CT of the abdomen pelvis drug allergies noted none of which include penicillin allergy.  Besides patient already tolerated piperacillin/tazobactam.    DC Lovenox meant for DVT prophylaxis given anemia.  Placed on SCD  Check occult blood test    ampicillin-sulbactam, 3 g, Intravenous, Q6H  atorvastatin, 40 mg, Oral, Daily  carvedilol, 12.5 mg, Oral, BID With Meals  donepezil, 10 mg, Oral, Nightly  [START ON 11/8/2023] epoetin kita/kita-epbx, 40,000 Units, Subcutaneous, Once per day on Mon Wed Fri  levothyroxine, 50 mcg, Oral, Q AM  pantoprazole, 40 mg, Oral, Q AM  phenazopyridine, 100 mg, Oral, TID With Meals  saccharomyces boulardii, 500 mg, Oral, BID  senna-docusate sodium, 2 tablet, Oral, BID  sertraline, 25 mg, Oral, Daily  sodium chloride, 10 mL, Intravenous, Q12H  venlafaxine, 25 mg, Oral, Daily        Conditions and Status     Fair     MDM Data  External documents reviewed: EGD and colonoscopy by Dr. Velasco.  Details yet to be further reviewed but some of the information been documented above  Cardiac tracing (EKG, telemetry) interpretation: -  Radiology interpretation: Reviewed CT of the abdomen pelvis, head CT scan  Labs reviewed: y  Any tests that were considered but not ordered: None     Decision rules/scores evaluated (example [id number], Wells, etc): None     Discussed with: Patient and nurse Kaylin at bedside     Care Planning  Shared decision making: Patient  Code status and discussions: Full code    Disposition  Social Determinants of Health that impact treatment or disposition: None identified at this time  I expect the  patient to be discharged to to be determined  Electronically signed by Jordan Cummins MD, 11/07/23, 10:16 CST.

## 2023-11-08 LAB
ANION GAP SERPL CALCULATED.3IONS-SCNC: 8 MMOL/L (ref 5–15)
BACTERIA SPEC AEROBE CULT: ABNORMAL
BASOPHILS # BLD AUTO: 0.02 10*3/MM3 (ref 0–0.2)
BASOPHILS NFR BLD AUTO: 0.3 % (ref 0–1.5)
BUN SERPL-MCNC: 12 MG/DL (ref 8–23)
BUN/CREAT SERPL: 17.9 (ref 7–25)
CALCIUM SPEC-SCNC: 8.1 MG/DL (ref 8.6–10.5)
CHLORIDE SERPL-SCNC: 108 MMOL/L (ref 98–107)
CO2 SERPL-SCNC: 27 MMOL/L (ref 22–29)
CREAT SERPL-MCNC: 0.67 MG/DL (ref 0.57–1)
DEPRECATED RDW RBC AUTO: 52.4 FL (ref 37–54)
EGFRCR SERPLBLD CKD-EPI 2021: 87.9 ML/MIN/1.73
EOSINOPHIL # BLD AUTO: 0.15 10*3/MM3 (ref 0–0.4)
EOSINOPHIL NFR BLD AUTO: 2.3 % (ref 0.3–6.2)
ERYTHROCYTE [DISTWIDTH] IN BLOOD BY AUTOMATED COUNT: 14.6 % (ref 12.3–15.4)
GLUCOSE SERPL-MCNC: 101 MG/DL (ref 65–99)
GRAM STN SPEC: ABNORMAL
HCT VFR BLD AUTO: 24.6 % (ref 34–46.6)
HGB BLD-MCNC: 7.3 G/DL (ref 12–15.9)
IMM GRANULOCYTES # BLD AUTO: 0.12 10*3/MM3 (ref 0–0.05)
IMM GRANULOCYTES NFR BLD AUTO: 1.9 % (ref 0–0.5)
ISOLATED FROM: ABNORMAL
ISOLATED FROM: ABNORMAL
LYMPHOCYTES # BLD AUTO: 1.14 10*3/MM3 (ref 0.7–3.1)
LYMPHOCYTES NFR BLD AUTO: 17.7 % (ref 19.6–45.3)
MCH RBC QN AUTO: 29.2 PG (ref 26.6–33)
MCHC RBC AUTO-ENTMCNC: 29.7 G/DL (ref 31.5–35.7)
MCV RBC AUTO: 98.4 FL (ref 79–97)
MONOCYTES # BLD AUTO: 0.4 10*3/MM3 (ref 0.1–0.9)
MONOCYTES NFR BLD AUTO: 6.2 % (ref 5–12)
NEUTROPHILS NFR BLD AUTO: 4.62 10*3/MM3 (ref 1.7–7)
NEUTROPHILS NFR BLD AUTO: 71.6 % (ref 42.7–76)
NRBC BLD AUTO-RTO: 0 /100 WBC (ref 0–0.2)
PLATELET # BLD AUTO: 201 10*3/MM3 (ref 140–450)
PMV BLD AUTO: 11 FL (ref 6–12)
POTASSIUM SERPL-SCNC: 3.3 MMOL/L (ref 3.5–5.2)
POTASSIUM SERPL-SCNC: 3.7 MMOL/L (ref 3.5–5.2)
RBC # BLD AUTO: 2.5 10*6/MM3 (ref 3.77–5.28)
SODIUM SERPL-SCNC: 143 MMOL/L (ref 136–145)
WBC NRBC COR # BLD: 6.45 10*3/MM3 (ref 3.4–10.8)

## 2023-11-08 PROCEDURE — 80048 BASIC METABOLIC PNL TOTAL CA: CPT | Performed by: INTERNAL MEDICINE

## 2023-11-08 PROCEDURE — 25010000002 EPOETIN ALFA-EPBX 40000 UNIT/ML SOLUTION: Performed by: INTERNAL MEDICINE

## 2023-11-08 PROCEDURE — 85025 COMPLETE CBC W/AUTO DIFF WBC: CPT | Performed by: INTERNAL MEDICINE

## 2023-11-08 PROCEDURE — 84132 ASSAY OF SERUM POTASSIUM: CPT | Performed by: INTERNAL MEDICINE

## 2023-11-08 PROCEDURE — 25010000002 AMPICILLIN-SULBACTAM PER 1.5 G: Performed by: INTERNAL MEDICINE

## 2023-11-08 RX ORDER — POTASSIUM CHLORIDE 750 MG/1
40 CAPSULE, EXTENDED RELEASE ORAL EVERY 4 HOURS
Status: COMPLETED | OUTPATIENT
Start: 2023-11-08 | End: 2023-11-08

## 2023-11-08 RX ORDER — LIDOCAINE 50 MG/G
2 PATCH TOPICAL
Status: CANCELLED | OUTPATIENT
Start: 2023-11-08

## 2023-11-08 RX ORDER — LIDOCAINE 50 MG/G
2 PATCH TOPICAL
Status: DISCONTINUED | OUTPATIENT
Start: 2023-11-08 | End: 2023-11-09 | Stop reason: HOSPADM

## 2023-11-08 RX ADMIN — Medication 500 MG: at 08:31

## 2023-11-08 RX ADMIN — PHENAZOPYRIDINE HYDROCHLORIDE 100 MG: 100 TABLET ORAL at 18:25

## 2023-11-08 RX ADMIN — Medication 10 ML: at 21:37

## 2023-11-08 RX ADMIN — VENLAFAXINE 25 MG: 50 TABLET ORAL at 08:31

## 2023-11-08 RX ADMIN — LEVOTHYROXINE SODIUM 50 MCG: 50 TABLET ORAL at 05:41

## 2023-11-08 RX ADMIN — POTASSIUM CHLORIDE 40 MEQ: 750 CAPSULE, EXTENDED RELEASE ORAL at 12:42

## 2023-11-08 RX ADMIN — PHENAZOPYRIDINE HYDROCHLORIDE 100 MG: 100 TABLET ORAL at 08:30

## 2023-11-08 RX ADMIN — SODIUM CHLORIDE 3 G: 900 INJECTION INTRAVENOUS at 05:42

## 2023-11-08 RX ADMIN — ATORVASTATIN CALCIUM 40 MG: 40 TABLET ORAL at 08:31

## 2023-11-08 RX ADMIN — CARVEDILOL 12.5 MG: 6.25 TABLET, FILM COATED ORAL at 08:30

## 2023-11-08 RX ADMIN — POTASSIUM CHLORIDE 40 MEQ: 750 CAPSULE, EXTENDED RELEASE ORAL at 06:50

## 2023-11-08 RX ADMIN — SODIUM CHLORIDE 3 G: 900 INJECTION INTRAVENOUS at 18:27

## 2023-11-08 RX ADMIN — DOCUSATE SODIUM 50 MG AND SENNOSIDES 8.6 MG 2 TABLET: 8.6; 5 TABLET, FILM COATED ORAL at 08:30

## 2023-11-08 RX ADMIN — Medication 500 MG: at 21:37

## 2023-11-08 RX ADMIN — SODIUM CHLORIDE 3 G: 900 INJECTION INTRAVENOUS at 12:49

## 2023-11-08 RX ADMIN — DONEPEZIL HYDROCHLORIDE 10 MG: 10 TABLET, FILM COATED ORAL at 21:37

## 2023-11-08 RX ADMIN — SODIUM CHLORIDE 3 G: 900 INJECTION INTRAVENOUS at 23:31

## 2023-11-08 RX ADMIN — CARVEDILOL 12.5 MG: 6.25 TABLET, FILM COATED ORAL at 18:25

## 2023-11-08 RX ADMIN — LIDOCAINE 2 PATCH: 50 PATCH TOPICAL at 12:58

## 2023-11-08 RX ADMIN — BUTALBITAL, ACETAMINOPHEN, AND CAFFEINE 1 TABLET: 50; 325; 40 TABLET ORAL at 14:45

## 2023-11-08 RX ADMIN — SERTRALINE HYDROCHLORIDE 25 MG: 50 TABLET, FILM COATED ORAL at 08:31

## 2023-11-08 RX ADMIN — SODIUM CHLORIDE 3 G: 900 INJECTION INTRAVENOUS at 00:30

## 2023-11-08 RX ADMIN — LORAZEPAM 0.5 MG: 0.5 TABLET ORAL at 21:37

## 2023-11-08 RX ADMIN — PANTOPRAZOLE SODIUM 40 MG: 40 TABLET, DELAYED RELEASE ORAL at 05:42

## 2023-11-08 RX ADMIN — BUTALBITAL, ACETAMINOPHEN, AND CAFFEINE 1 TABLET: 50; 325; 40 TABLET ORAL at 06:40

## 2023-11-08 RX ADMIN — ACETAMINOPHEN 650 MG: 325 TABLET, FILM COATED ORAL at 08:30

## 2023-11-08 RX ADMIN — EPOETIN ALFA-EPBX 40000 UNITS: 40000 INJECTION, SOLUTION INTRAVENOUS; SUBCUTANEOUS at 13:43

## 2023-11-08 NOTE — CASE MANAGEMENT/SOCIAL WORK
Continued Stay Note   [place]     Patient Name: Jennifer Gomes  MRN: [id number]  Today's Date: 11/8/2023    Admit Date: 11/5/2023    Plan: Home   Discharge Plan       Row Name 11/08/23 0939       Plan    Plan Home    Patient/Family in Agreement with Plan yes    Plan Comments Spoke with Adams Gomes Spouse [phone] to reassess home needs. Pt did not know during last CM visit whether HH current, per spouse they are not. He does not feel pt would need HH anymore at home.  No needs identified at present. Will follow.                   Discharge Codes    No documentation.                 Expected Discharge Date and Time       Expected Discharge Date Expected Discharge Time    Nov 10, 2023               OSKAR Aranda

## 2023-11-08 NOTE — PLAN OF CARE
Problem: Adult Inpatient Plan of Care  Goal: Plan of Care Review  Outcome: Ongoing, Progressing  Flowsheets (Taken 11/8/2023 0400)  Progress: no change  Plan of Care Reviewed With: patient  Outcome Evaluation: Pt resting through shift. Intermittent confusion. Incontinent of urine and stool. IV antibiotics. Stool specimen still needed due to incontinence. Monitoring for lab values. Safety maintained.

## 2023-11-08 NOTE — PROGRESS NOTES
[facility] Medicine Services  INPATIENT PROGRESS NOTE    Patient Name: Jennifer Gomes  Date of Admission: 11/5/2023  Today's Date: 11/08/23  Length of Stay: 3  Primary Care Physician: Olivia Mora APRN    Subjective   Chief Complaint: f/u   HPI         No new event  After speaking to her yesterday, she agreed BT but later changed her mind. No active bleeding. Hgn  6.5  Started on retacrit. 1st dose today       HTN BP reading - on carvedilol; MAR reviewed; refused PM dose of antihtn     + BM    Changed to Unasyn yesterday. Prior abx zosyn     On k replacement (3.3 K+)    Patient currently resting.  She opened her eyes as I speak to nurse Fatoumata at bedside.      Review of Systems   All pertinent negatives and positives are as above. All other systems have been reviewed and are negative unless otherwise stated.     Objective    Temp:  [97.2 °F (36.2 °C)-98.1 °F (36.7 °C)] 97.2 °F (36.2 °C)  Heart Rate:  [72-88] 78  Resp:  [16] 16  BP: (140-161)/(51-62) 161/51  Physical Exam  Pale  No distress  Resting not in any distress  No gross apneic spells however mouth breathing.  Oxygen set at 2 L however not position to her nostrils neither.  O2 saturation ranged anywhere from 86 to 92%.  HEENT: Atraumatic, Anicteric, Trachea midline  Lungs: CTAB, no wheezing/rales/rhonchi  Heart: RRR, +S1/s2, no rub  ABD: soft, nt/nd, +BS, no guarding/rebound  Extremities: atraumatic, no cyanosis, no edema  Skin: no rashes or lesions  Psych: normal mood & affect       Results Review:  I have reviewed the labs, radiology results, and diagnostic studies.    Laboratory Data:   Results from last 7 days   Lab Units 11/08/23  0435 11/07/23  0333 11/06/23  1925 11/06/23  0354   WBC 10*3/mm3 6.45 6.73  --  8.35   HEMOGLOBIN g/dL 7.3* 6.5* 6.6* 6.7*   HEMATOCRIT % 24.6* 22.7* 22.2* 22.1*   PLATELETS 10*3/mm3 201 190  --  168        Results from last 7 days   Lab Units 11/08/23  0435 11/07/23  0338  11/06/23  1925 11/06/23  0354 11/05/23  1636   SODIUM mmol/L 143 138 141   < > 140   POTASSIUM mmol/L 3.3* 3.7  3.7 3.3*   < > 2.7*   CHLORIDE mmol/L 108* 106 108*   < > 105   CO2 mmol/L 27.0 26.0 27.0   < > 24.0   BUN mg/dL 12 17 19   < > 21   CREATININE mg/dL 0.67 0.82 0.79   < > 0.82   CALCIUM mg/dL 8.1* 8.1* 8.4*   < > 9.0   BILIRUBIN mg/dL  --   --   --   --  0.6   ALK PHOS U/L  --   --   --   --  79   ALT (SGPT) U/L  --   --   --   --  7   AST (SGOT) U/L  --   --   --   --  12   GLUCOSE mg/dL 101* 110* 153*   < > 127*    < > = values in this interval not displayed.       Culture Data:   Blood Culture   Date Value Ref Range Status   11/05/2023 Enterococcus faecalis (C)  Final     Comment:       Infectious disease consultation is highly recommended.   11/05/2023 Staphylococcus, coagulase negative (C)  Final   11/05/2023 No growth at 2 days  Preliminary     Urine Culture   Date Value Ref Range Status   11/05/2023 >100,000 CFU/mL Escherichia coli (A)  Final       Radiology Data:   Imaging Results (Last 24 Hours)       ** No results found for the last 24 hours. **            I have reviewed the patient's current medications.     Assessment/Plan   Assessment  Active Hospital Problems    Diagnosis     **UTI (urinary tract infection)          Medical Decision Making  Number and Complexity of problems:   Bacteremia-Enterococcus  Recent urinary tract infection reportedly failed outpatient therapy (cefdinir) - urine cx from Nov 5 . Ecoli susceptible to cephalosphorin; enough coverage from Unasyn    Anemia-limit phlebotomy; patient initially agreed for transfusion but later declined.  Ferritin noted to be elevated could be an acute phase reactant due to current infectious process versus under utilization of iron store.  Patient's iron profile suggests anemia of chronic disease.     Hypokalemia- replace as needed    Hypomagnesemia mg 1.7 as of 11/7    Constipation (chronic)-bowel regimen-proctitis versus stercoral colitis  considered.    Moderate right hydronephrosis per imaging study as mentioned below.  I reviewed most recent hospitalization Sept 25 - none seen in relation to this.  Hypothyroidism -continue levothyroxine    Abnormal CT-mentioned to have moderate right-sided hydronephrosis slightly increased compared to September 4.  We will have to review old record if urology been consulted at some point.  Otherwise, renal function is stable.  Dementia likely affecting her decision when initially she agreed for blood transfusion but may have forgotten that she agreed if needed     Hypoxia during sleep    Hyperlipidemia-continue on statin.    Hypertension-refuse dose of carvedilol at nighttime.  Monitor.  Continue current management.    Treatment Plan  Scd for dvt proph  Unasyn  Occult Blood test pending   Sw on d/c planning   Supplemental oxygen.  Increase activities as tolerated  Med reviewed  ampicillin-sulbactam, 3 g, Intravenous, Q6H  atorvastatin, 40 mg, Oral, Daily  carvedilol, 12.5 mg, Oral, BID With Meals  donepezil, 10 mg, Oral, Nightly  epoetin kita/kita-epbx, 40,000 Units, Subcutaneous, Once per day on Mon Wed Fri  levothyroxine, 50 mcg, Oral, Q AM  pantoprazole, 40 mg, Oral, Q AM  phenazopyridine, 100 mg, Oral, TID With Meals  potassium chloride ER, 40 mEq, Oral, Q4H  saccharomyces boulardii, 500 mg, Oral, BID  senna-docusate sodium, 2 tablet, Oral, BID  sertraline, 25 mg, Oral, Daily  sodium chloride, 10 mL, Intravenous, Q12H  venlafaxine, 25 mg, Oral, Daily        Conditions and Status        fair     MDM Data  External documents reviewed:   C scope 2021 Oct  Internal hemorrhoids were found during retroflexion. The hemorrhoids were Grade I (internal  hemorrhoids that do not prolapse).  The entire examined colon appeared normal.  Multiple small-mouthed diverticula were found in the sigmoid colon.    EGD Sept 2017:  Normal esophagus.  - Non-bleeding gastric ulcer with no stigmata of bleeding. Biopsied.  - Normal  examined duodenum.    Cardiac tracing (EKG, telemetry) interpretation: -  Radiology interpretation: Reviewed CT of the abdomen pelvis, head CT scan  Labs reviewed: y  Any tests that were considered but not ordered: None     Decision rules/scores evaluated (example [id number], Wells, etc): None     Discussed with: Patient and nurse   I reached out to pierce Coronado to no avail.     Care Planning  Shared decision making: Patient  Code status and discussions: Full code     Disposition  Social Determinants of Health that impact treatment or disposition: None identified at this time  I expect the patient to be discharged to to be determined    Electronically signed by Jordan Cummins MD, 11/08/23, 07:47 CST.

## 2023-11-08 NOTE — PLAN OF CARE
Goal Outcome Evaluation:  Plan of Care Reviewed With: patient        Progress: no change  Outcome Evaluation: PT. CONT. TO REFUSE BLOOD TRANSFUSION. MD AWARE. IID RESTARTED TODAY. IV ANTIBIOTICS PER ORDER. NO ACTIVE BLEEDING NOTED. PT. INCONTINENT OF URINE AND STOOL. HGB = 6.5 CONT. TO MONITOR LAB VALUES. STOOL SPECIMEN NEEDED.

## 2023-11-09 ENCOUNTER — READMISSION MANAGEMENT (OUTPATIENT)
Dept: CALL CENTER | Facility: HOSPITAL | Age: 83
End: 2023-11-09
Payer: MEDICARE

## 2023-11-09 VITALS
OXYGEN SATURATION: 94 % | SYSTOLIC BLOOD PRESSURE: 145 MMHG | DIASTOLIC BLOOD PRESSURE: 71 MMHG | BODY MASS INDEX: 30.02 KG/M2 | RESPIRATION RATE: 16 BRPM | HEART RATE: 82 BPM | HEIGHT: 62 IN | WEIGHT: 163.14 LBS | TEMPERATURE: 98.1 F

## 2023-11-09 PROBLEM — D64.9 ANEMIA: Status: ACTIVE | Noted: 2019-04-12

## 2023-11-09 PROBLEM — F03.90 DEMENTIA: Status: ACTIVE | Noted: 2023-11-09

## 2023-11-09 PROBLEM — B95.2 BACTEREMIA DUE TO ENTEROCOCCUS: Status: ACTIVE | Noted: 2023-11-09

## 2023-11-09 PROBLEM — R78.81 BACTEREMIA DUE TO ENTEROCOCCUS: Status: ACTIVE | Noted: 2023-11-09

## 2023-11-09 LAB — HEMOCCULT STL QL: NEGATIVE

## 2023-11-09 PROCEDURE — 25010000002 AMPICILLIN-SULBACTAM PER 1.5 G: Performed by: INTERNAL MEDICINE

## 2023-11-09 PROCEDURE — 82272 OCCULT BLD FECES 1-3 TESTS: CPT | Performed by: INTERNAL MEDICINE

## 2023-11-09 PROCEDURE — 63710000001 ONDANSETRON PER 8 MG: Performed by: INTERNAL MEDICINE

## 2023-11-09 RX ORDER — AMOXICILLIN AND CLAVULANATE POTASSIUM 875; 125 MG/1; MG/1
1 TABLET, FILM COATED ORAL EVERY 12 HOURS SCHEDULED
Qty: 8 TABLET | Refills: 0 | Status: SHIPPED | OUTPATIENT
Start: 2023-11-09 | End: 2023-11-13

## 2023-11-09 RX ORDER — LIDOCAINE 50 MG/G
2 PATCH TOPICAL
Qty: 30 PATCH | Refills: 0 | Status: SHIPPED | OUTPATIENT
Start: 2023-11-09

## 2023-11-09 RX ORDER — AMOXICILLIN AND CLAVULANATE POTASSIUM 875; 125 MG/1; MG/1
1 TABLET, FILM COATED ORAL EVERY 12 HOURS SCHEDULED
Status: DISCONTINUED | OUTPATIENT
Start: 2023-11-09 | End: 2023-11-09 | Stop reason: HOSPADM

## 2023-11-09 RX ORDER — SACCHAROMYCES BOULARDII 250 MG
500 CAPSULE ORAL 2 TIMES DAILY
Qty: 20 CAPSULE | Refills: 0 | Status: SHIPPED | OUTPATIENT
Start: 2023-11-09 | End: 2023-11-14

## 2023-11-09 RX ADMIN — PHENAZOPYRIDINE HYDROCHLORIDE 100 MG: 100 TABLET ORAL at 08:15

## 2023-11-09 RX ADMIN — PANTOPRAZOLE SODIUM 40 MG: 40 TABLET, DELAYED RELEASE ORAL at 05:34

## 2023-11-09 RX ADMIN — ATORVASTATIN CALCIUM 40 MG: 40 TABLET ORAL at 08:14

## 2023-11-09 RX ADMIN — ONDANSETRON 4 MG: 4 TABLET, FILM COATED ORAL at 07:04

## 2023-11-09 RX ADMIN — AMOXICILLIN AND CLAVULANATE POTASSIUM 1 TABLET: 875; 125 TABLET, FILM COATED ORAL at 08:31

## 2023-11-09 RX ADMIN — LEVOTHYROXINE SODIUM 50 MCG: 50 TABLET ORAL at 05:34

## 2023-11-09 RX ADMIN — PHENAZOPYRIDINE HYDROCHLORIDE 100 MG: 100 TABLET ORAL at 11:11

## 2023-11-09 RX ADMIN — VENLAFAXINE 25 MG: 50 TABLET ORAL at 08:18

## 2023-11-09 RX ADMIN — SODIUM CHLORIDE 3 G: 900 INJECTION INTRAVENOUS at 05:34

## 2023-11-09 RX ADMIN — Medication 10 ML: at 08:25

## 2023-11-09 RX ADMIN — SERTRALINE HYDROCHLORIDE 25 MG: 50 TABLET, FILM COATED ORAL at 08:16

## 2023-11-09 RX ADMIN — LIDOCAINE 2 PATCH: 50 PATCH TOPICAL at 08:28

## 2023-11-09 RX ADMIN — Medication 500 MG: at 08:18

## 2023-11-09 NOTE — DISCHARGE SUMMARY
[facility] Medicine Services  DISCHARGE SUMMARY       Date of Admission: 11/5/2023  Date of Discharge:  11/9/2023  Primary Care Physician: Olivia Mora APRN    Presenting Problem/History of Present Illness:  Altered mental status, fever    Final Discharge Diagnoses:  Active Hospital Problems    Diagnosis     **UTI (urinary tract infection)     Bacteremia due to Enterococcus     Dementia     Grade III internal hemorrhoids     Anemia    In addition to above  Declined blood transfusion other than [facility] reason.  Hypokalemia  Hypomagnesemia  Chronic constipation with proctitis versus stercoral colitis  Moderate right hydronephrosis without evidence of acute kidney injury; defer to primary care provider for further review of record and referral to urology if needed  Hypothyroidism  Hypoxia during sleep  Hyperlipidemia  Hypertension  Knee pain-suspected arthritis    Consults: none    Procedures Performed: none    Pertinent Test Results:   Results for orders placed during the hospital encounter of 09/04/23    Adult Transthoracic Echo Complete W/ Cont if Necessary Per Protocol    Interpretation Summary    Left ventricular systolic function is normal. Left ventricular ejection fraction appears to be 56 - 60%.    Left ventricular wall thickness is consistent with moderate concentric hypertrophy.    Left ventricular diastolic function is consistent with (grade Ia w/high LAP) impaired relaxation.    The left atrial cavity is moderate to severely dilated.    Left atrial volume is moderately increased.    The right atrial cavity is borderline dilated.    Estimated right ventricular systolic pressure from tricuspid regurgitation is mildly elevated (35-45 mmHg).      Imaging Results (All)       Procedure Component Value Units Date/Time    CT Abdomen Pelvis With Contrast [[id number]] Collected: 11/05/23 1748     Updated: 11/05/23 1801    Narrative:      CT ABDOMEN PELVIS W CONTRAST-  11/5/2023 5:33 PM CST     HISTORY: Abdominal pain and hypotension      COMPARISON: 9/4/2023, 2/8/2022     DOSE LENGTH PRODUCT: 665 mGy cm. Automated exposure control was also  utilized to decrease patient radiation dose.     TECHNIQUE: Axial images of the abdomen and pelvis are performed  following IV contrast.     FINDINGS: There is no suspicious focal liver or splenic mass. Spleen  remains enlarged measuring 13.9 x 10.0 x 8.0 cm. Similar peripherally  calcified 1.5 cm lesion in the splenic hilum, unchanged from 2/8/2022,  favoring thrombosed distal splenic artery aneurysm. No peripancreatic  inflammation. Cholecystectomy clips. No adrenal nodule.     There is moderate right-sided hydronephrosis, only slightly increased  compared to the 9/4/2023 exam. The right ureter is distended to the  level of the pelvic inlet, the right distal ureter difficult to  localize. Calcified phleboliths are seen within the lower pelvis. No  enhancing renal mass. Bladder under distended. Uterus is absent.     There is moderate stool present throughout the colon. No small bowel  dilatation. No free air or abscess. Decompressed stomach. Very small  hiatal hernia. Small fatty containing umbilical and supraumbilical  hernia defects. No herniation of the bowel.     Mild vascular calcification with no aneurysm. No pathologic  lymphadenopathy.     Visible lung bases are unremarkable.     No focal aggressive regional bony lesions. Grade 1 spondylolisthesis at  L4/5 due to facet arthropathy. No left renal collecting system  dilatation.       Impression:      Moderate stool seen throughout the colon with borderline wall thickening  of the rectum. Proctitis versus stercoral colitis considered. No free  air or abscess. No small bowel dilatation. Very small hiatal hernia.     2. Moderate right-sided hydronephrosis, only slightly increased compared  to 9/4/2023. Right ureter dilated to the level of the pelvic inlet with  diminished visualization of  the right distal ureter. No discrete  obstructing stone. If this finding is unknown, a follow-up nonemergent  CT urography study could be performed for further evaluation following  24 hours after today's contrast administration.     3. Stable mild splenomegaly. Stable peripherally calcified 1.5 cm lesion  of the splenic hilum favoring thrombosed splenic artery aneurysm.     This report was signed and finalized on 11/5/2023 5:58 PM CST by Dr. Blanka Pak MD.       CT Head Without Contrast [[id number]] Collected: 11/05/23 1746     Updated: 11/05/23 1751    Narrative:      CT HEAD WO CONTRAST- 11/5/2023 5:33 PM CST     HISTORY: AMS      COMPARISON: 9/23/2023     DOSE LENGTH PRODUCT: 652 mGy cm. Automated exposure control was also  utilized to decrease patient radiation dose.     TECHNIQUE: Axial images of the brain obtained without contrast     FINDINGS: Similar prominence of the sulci and ventricles favoring  moderate cerebral volume loss. Chronic small vessel ischemic changes. No  intracranial hemorrhage or mass effect. No acute signs of ischemia. No  extra-axial hematoma or subarachnoid hemorrhage.     Chronic opacification of the right maxillary sinus with hyperostosis of  the right maxillary antrum. Mucosal thickening again seen within the  visible left maxillary sinus. Bony calvarium appears intact.       Impression:      1. Stable exam. Moderate cerebral volume loss with chronic vessel  ischemia. No acute intracranial process. Chronic opacification of the  right greater than left maxillary sinuses.        This report was signed and finalized on 11/5/2023 5:48 PM CST by Dr. Blanka Pak MD.       XR Chest 1 View [[id number]] Collected: 11/05/23 1719     Updated: 11/05/23 1724    Narrative:      XR CHEST 1 VW-     HISTORY: Fever     COMPARISON: 9/23/2023     FINDINGS: Frontal view the chest obtained.     Similar elevation right hemidiaphragm. Increasing right basilar  opacities concerning for patchy  atelectasis or pneumonia. Similar  prominence of the hilar stations. Stable mediastinal contours. No  pleural effusion or pneumothorax. No acute regional bony pathology.       Impression:      1. Chronic elevation right hemidiaphragm. Right basilar opacities may  relate to atelectasis versus pneumonia. Otherwise no acute process  identified.        This report was signed and finalized on 11/5/2023 5:21 PM CST by Dr. Blanka Pak MD.             LAB RESULTS:      Lab 11/08/23  0435 11/07/23  0333 11/06/23 1925 11/06/23 0354 11/05/23 1938 11/05/23  1636   WBC 6.45 6.73  --  8.35  --  10.11   HEMOGLOBIN 7.3* 6.5* 6.6* 6.7*  --  7.7*   HEMATOCRIT 24.6* 22.7* 22.2* 22.1*  --  25.2*   PLATELETS 201 190  --  168  --  217   NEUTROS ABS 4.62 5.27  --  6.89  --  8.54*   IMMATURE GRANS (ABS) 0.12* 0.03  --  0.05  --  0.09*   LYMPHS ABS 1.14 0.84  --  0.85  --  0.78   MONOS ABS 0.40 0.46  --  0.54  --  0.68   EOS ABS 0.15 0.12  --  0.01  --  0.00   MCV 98.4* 98.3*  --  97.8*  --  96.9   LACTATE  --   --   --   --  0.7 2.9*         Lab 11/08/23  1554 11/08/23  0435 11/07/23 0333 11/06/23 1925 11/06/23 0354 11/05/23  1636   SODIUM  --  143 138 141 141 140   POTASSIUM 3.7 3.3* 3.7  3.7 3.3* 2.5* 2.7*   CHLORIDE  --  108* 106 108* 106 105   CO2  --  27.0 26.0 27.0 26.0 24.0   ANION GAP  --  8.0 6.0 6.0 9.0 11.0   BUN  --  12 17 19 16 21   CREATININE  --  0.67 0.82 0.79 0.71 0.82   EGFR  --  87.9 72.0 75.3 85.5 72.0   GLUCOSE  --  101* 110* 153* 114* 127*   CALCIUM  --  8.1* 8.1* 8.4* 8.5* 9.0   MAGNESIUM  --   --  1.8  --  1.8 1.4*   PHOSPHORUS  --   --   --   --  2.4*  --          Lab 11/05/23  1636   TOTAL PROTEIN 6.0   ALBUMIN 2.4*   GLOBULIN 3.6   ALT (SGPT) 7   AST (SGOT) 12   BILIRUBIN 0.6   ALK PHOS 79   LIPASE 25                 Lab 11/06/23  1925   IRON 16*   IRON SATURATION (TSAT) 12*   TIBC 131*   TRANSFERRIN 88*   FERRITIN 456.40*   ABO TYPING O   RH TYPING Positive   ANTIBODY SCREEN Negative         Lab  11/05/23  1803   PH, ARTERIAL 7.550*   PCO2, ARTERIAL 28.9*   PO2 .0   O2 SATURATION ART 99.4*   HCO3 ART 25.3   BASE EXCESS ART 2.8*     Brief Urine Lab Results  (Last result in the past 365 days)        Color   Clarity   Blood   Leuk Est   Nitrite   Protein   CREAT   Urine HCG        11/05/23 1651 Yellow   Turbid   Small (1+)   Large (3+)   Positive   30 mg/dL (1+)                 Microbiology Results (last 10 days)       Procedure Component Value - Date/Time    Urine Culture - Urine, Straight Cath [[id number]]  (Abnormal)  (Susceptibility) Collected: 11/05/23 1651    Lab Status: Final result Specimen: Urine from Straight Cath Updated: 11/08/23 0251     Urine Culture >100,000 CFU/mL Escherichia coli    Narrative:      Colonization of the urinary tract without infection is common. Treatment is discouraged unless the patient is symptomatic, pregnant, or undergoing an invasive urologic procedure.    Susceptibility        Escherichia coli      CHULA      Ampicillin Susceptible      Ampicillin + Sulbactam Susceptible      Cefazolin Susceptible      Cefepime Susceptible      Ceftazidime Susceptible      Ceftriaxone Susceptible      Gentamicin Susceptible      Levofloxacin Resistant      Nitrofurantoin Susceptible      Piperacillin + Tazobactam Susceptible      Trimethoprim + Sulfamethoxazole Susceptible                           Blood Culture - Blood, Wrist, Left [[id number]]  (Abnormal)  (Susceptibility) Collected: 11/05/23 1636    Lab Status: Final result Specimen: Blood from Wrist, Left Updated: 11/08/23 0507     Blood Culture Enterococcus faecalis     Comment:   Infectious disease consultation is highly recommended.        Isolated from Pediatric Bottle     Blood Culture Staphylococcus, coagulase negative     Isolated from Pediatric Bottle     Gram Stain Pediatric Bottle Gram positive cocci in pairs    Narrative:      Staphylococcus, coagulase negative: Probable contaminant requires clinical correlation,  susceptibility not performed unless requested by physician.      Susceptibility        Enterococcus faecalis      CHULA      Ampicillin Susceptible      Gentamicin High Level Synergy Susceptible      Vancomycin Susceptible                           Blood Culture ID, PCR - Blood, Wrist, Left [[id number]]  (Abnormal) Collected: 11/05/23 1636    Lab Status: Final result Specimen: Blood from Wrist, Left Updated: 11/06/23 0923     BCID, PCR Enterococcus faecalis. Melanie/B (vancomycin resistance gene) not detected. Identification by BCID2 PCR.     BCID, PCR 2 Staph spp, not aureus or lugdunensis. Identification by BCID2 PCR.     BOTTLE TYPE Pediatric Bottle    Narrative:      Infectious disease consultation is highly recommended to rule out distant foci of infection.    Blood Culture - Blood, Hand, Left [[id number]]  (Normal) Collected: 11/05/23 1630    Lab Status: Preliminary result Specimen: Blood from Hand, Left Updated: 11/08/23 1700     Blood Culture No growth at 3 days            Hospital Course:     She is an 83-year-old woman who was admitted on November 5 reportedly altered mental status, fever.  Patient reportedly had failed outpatient treatment for urinary tract infection.  Urine culture on November 5 showed Enterococcus E. coli susceptible to both antibiotic except for levofloxacin.  The other infection picked up is an Enterococcus bacteremia to which patient is susceptible to ampicillin however due to finding of colitis versus proctitis in a patient with moderate stool seen throughout the colon with borderline thickening of the rectum, I choose Unasyn.  Patient tolerated the antibiotic.  She was switched to Augmentin upon discharge.  Clinically she did well and appears able to make a decision for herself.  I explained to her about her significant anemia.  I reviewed prior records of colonoscopy and EGD.  She has been found with diverticulosis.  She also had shown ulcer with no stigmata of bleeding on EGD.  I  "offered her transfusion but declined.  She verbalized adequate understanding.  Family supported her decision.  Instead, we started patient on Retacrit and made arrangement to follow-up with hematology for consideration of continued treatment of anemia other than blood transfusion.  Clinically patient is improved and believed to be medically stable and appropriate for discharge.  All questions were answered to the best of my abilities during encounter and phone conversation.    Physical Exam on Discharge:  /54 (BP Location: Left arm, Patient Position: Lying)   Pulse 85   Temp 97.7 °F (36.5 °C) (Oral)   Resp 16   Ht 157.5 cm (62\")   Wt 74 kg (163 lb 2.3 oz)   LMP  (LMP Unknown)   SpO2 94%   BMI 29.84 kg/m²   Physical Exam    Pale  No distress  Resting not in any distress  No gross apneic spells however mouth breathing.    HEENT: Atraumatic, Anicteric, Trachea midline  Lungs: CTAB, no wheezing/rales/rhonchi  Heart: RRR, +S1/s2, no rub  ABD: soft, nt/nd, +BS, no guarding/rebound  Extremities: atraumatic, no cyanosis, no edema  Skin: no rashes or lesions  Psych: normal mood & affect  Condition on Discharge:   Stable    Discharge Disposition:  Home-Health Care [facility]    Discharge Medications:     Discharge Medications        New Medications        Instructions Start Date   amoxicillin-clavulanate 875-125 MG per tablet  Commonly known as: AUGMENTIN   1 tablet, Oral, Every 12 Hours Scheduled      lidocaine 5 %  Commonly known as: LIDODERM   2 patches, Transdermal, Every 24 Hours Scheduled, Remove & Discard patch within 12 hours or as directed by MD      saccharomyces boulardii 250 MG capsule  Commonly known as: FLORASTOR   500 mg, Oral, 2 Times Daily             Continue These Medications        Instructions Start Date   acetaminophen 325 MG tablet  Commonly known as: TYLENOL   650 mg, Oral, Every 6 Hours PRN      atorvastatin 40 MG tablet  Commonly known as: LIPITOR   1 tablet, Oral, Daily    "   butalbital-acetaminophen-caffeine -40 MG per tablet  Commonly known as: FIORICET, ESGIC   1 tablet, Oral, 2 Times Daily PRN      carvedilol 12.5 MG tablet  Commonly known as: COREG   12.5 mg, Oral, 2 Times Daily With Meals      cyclobenzaprine 10 MG tablet  Commonly known as: FLEXERIL   10 mg, Oral, 2 Times Daily      donepezil 10 MG tablet  Commonly known as: ARICEPT   10 mg, Oral, Nightly      ergocalciferol 1.25 MG (01880 UT) capsule  Commonly known as: ERGOCALCIFEROL   50,000 Units, Oral, Weekly, Saturday      LACTASE ENZYME PO   3 tablets, Oral, As Needed      lansoprazole 30 MG capsule  Commonly known as: PREVACID   30 mg, Oral, Every Morning      levothyroxine 50 MCG tablet  Commonly known as: SYNTHROID, LEVOTHROID   50 mcg, Oral, Every Morning      polyethylene glycol 17 g packet  Commonly known as: MIRALAX   17 g, Oral, Daily PRN      sertraline 25 MG tablet  Commonly known as: ZOLOFT   25 mg, Oral, Daily      SUMAtriptan 50 MG tablet  Commonly known as: IMITREX   50 mg, Oral, 2 Times Daily PRN      venlafaxine 25 MG tablet  Commonly known as: EFFEXOR   25 mg, Oral, Daily             Stop These Medications      ibuprofen 200 MG tablet  Commonly known as: ADVIL,MOTRIN     oxyCODONE 15 MG immediate release tablet  Commonly known as: ROXICODONE              This patient has current or prior documentation of an left ventricular ejection fraction (LVEF) of less than or equal to 40%. -Not applicable based on echocardiogram as stated below.    Results for orders placed during the hospital encounter of 09/04/23    Adult Transthoracic Echo Complete W/ Cont if Necessary Per Protocol    Interpretation Summary    Left ventricular systolic function is normal. Left ventricular ejection fraction appears to be 56 - 60%.    Left ventricular wall thickness is consistent with moderate concentric hypertrophy.    Left ventricular diastolic function is consistent with (grade Ia w/high LAP) impaired relaxation.    The  left atrial cavity is moderate to severely dilated.    Left atrial volume is moderately increased.    The right atrial cavity is borderline dilated.    Estimated right ventricular systolic pressure from tricuspid regurgitation is mildly elevated (35-45 mmHg).        Discharge Diet:   Diet Instructions       Diet: Regular/House Diet; Regular Texture (IDDSI 7); Thin (IDDSI 0)      Discharge Diet: Regular/House Diet    Texture: Regular Texture (IDDSI 7)    Fluid Consistency: Thin (IDDSI 0)            Activity at Discharge:   Activity Instructions       Gradually Increase Activity Until at Pre-Hospitalization Level              Follow-up Appointments:     PCP within 1 week  Patient will be set up for outpatient referral to dermatology regarding continued directed treatment in a patient with anemia who refused blood transfusion.    Test Results Pending at Discharge: None  Electronically signed by Jordan Cummins MD, 11/09/23, 08:14 CST.    Time: Greater than 30 minutes.

## 2023-11-09 NOTE — PLAN OF CARE
Goal Outcome Evaluation:   Medicated for bilateral knee pain with desired effect. Afebrile. ATB therapy continues with no adverse reaction. VSS. Safety maintained.

## 2023-11-09 NOTE — PLAN OF CARE
Goal Outcome Evaluation:           Progress: no change  Outcome Evaluation: No c/o pain this shift, cont IV ABX, voiding, no BM this shift, booker diet

## 2023-11-10 LAB
BACTERIA SPEC AEROBE CULT: NORMAL
BH BB BLOOD EXPIRATION DATE: NORMAL
BH BB BLOOD TYPE BARCODE: 5100
BH BB DISPENSE STATUS: NORMAL
BH BB PRODUCT CODE: NORMAL
BH BB UNIT NUMBER: NORMAL
CROSSMATCH INTERPRETATION: NORMAL
UNIT  ABO: NORMAL
UNIT  RH: NORMAL

## 2023-11-10 NOTE — OUTREACH NOTE
Prep Survey      Flowsheet Row Responses   [facility] facility patient discharged from? [facility]   Is LACE score < 7 ? No   Eligibility Readm Mgmt   Discharge diagnosis UTI   Does the patient have one of the following disease processes/diagnoses(primary or secondary)? Other   Does the patient have Home health ordered? No   Is there a DME ordered? No   Prep survey completed? Yes            SUZIE VACA - Registered Nurse

## 2023-11-14 ENCOUNTER — TELEPHONE (OUTPATIENT)
Dept: INTERNAL MEDICINE | Age: 83
End: 2023-11-14

## 2023-11-14 NOTE — TELEPHONE ENCOUNTER
----- Message from Macholatricia Whipple sent at 11/9/2023  8:29 AM CST -----  Subject: Hospital Follow Up    QUESTIONS  What hospital was the Patient Discharged from? [facility]  Date of Discharge? 2023-11-09  Discharge Location? Reason for hospitalization as patient stated? What question does the patient have, if applicable?   ---------------------------------------------------------------------------  --------------  CALL BACK INFO  What is the best way for the office to contact you? OK to leave message on   voicemail  Preferred Call Back Phone Number? [phone]  ---------------------------------------------------------------------------  --------------  SCRIPT ANSWERS  Relationship to Patient? Covered Entity  Covered Entity Type?  Hospital?  Representative Name? Cammy Cranker

## 2023-11-15 ENCOUNTER — READMISSION MANAGEMENT (OUTPATIENT)
Dept: CALL CENTER | Facility: HOSPITAL | Age: 83
End: 2023-11-15
Payer: MEDICARE

## 2023-11-15 NOTE — OUTREACH NOTE
Medical Week 1 Survey      Flowsheet Row Responses   [facility] patient discharged from? [place]   Does the patient have one of the following disease processes/diagnoses(primary or secondary)? Other   Week 1 attempt successful? Yes   Call start time 1306   Call end time 1312   Discharge diagnosis UTI, bacteremia due to Enterococcus, dementia   Is patient permission given to speak with other caregiver? Yes   Person spoke with today (if not patient) and relationship Ivonne Liang Daughter   Meds reviewed with patient/caregiver? Yes   Does the patient have all medications ordered at discharge? Yes   Is the patient taking all medications as directed (includes completed medication regime)? Yes   Does the patient have a primary care provider?  Yes   Does the patient have an appointment with their PCP within 7 days of discharge? No   Comments regarding PCP Follow up with Olivia Mora PCP   Nursing Interventions Educated patient on importance of making appointment, Advised patient to make appointment   Has the patient kept scheduled appointments due by today? N/A   Has home health visited the patient within 72 hours of discharge? N/A   Psychosocial issues? No   Did the patient receive a copy of their discharge instructions? Yes   Nursing interventions Reviewed instructions with patient  [daughter]   What is the patient's perception of their health status since discharge? Same   Is the patient/caregiver able to teach back signs and symptoms related to disease process for when to call PCP? Yes   Is the patient/caregiver able to teach back signs and symptoms related to disease process for when to call 911? Yes   Is the patient/caregiver able to teach back the hierarchy of who to call/visit for symptoms/problems? PCP, Specialist, Home health nurse, Urgent Care, ED, 911 Yes   If the patient is a current smoker, are they able to teach back resources for cessation? Not a smoker   Week 1 call completed? Yes   Would this  patient benefit from a Referral to [facility] Social Work? No   Is the patient interested in additional calls from an ambulatory ? No   Call end time 1312            CHRISS WOMACK - Registered Nurse

## 2023-11-28 ENCOUNTER — READMISSION MANAGEMENT (OUTPATIENT)
Dept: CALL CENTER | Facility: HOSPITAL | Age: 83
End: 2023-11-28
Payer: MEDICARE

## 2023-11-28 NOTE — OUTREACH NOTE
Medical Week 3 Survey      Flowsheet Row Responses   [facility] patient discharged from? [place]   Does the patient have one of the following disease processes/diagnoses(primary or secondary)? Other   Week 3 attempt successful? Yes   Call start time 1218   Call end time 1219   Discharge diagnosis UTI, bacteremia due to Enterococcus, dementia   Meds reviewed with patient/caregiver? Yes   Is the patient having any side effects they believe may be caused by any medication additions or changes? No   Does the patient have all medications ordered at discharge? Yes   Is the patient taking all medications as directed (includes completed medication regime)? Yes   Does the patient have a primary care provider?  Yes   Does the patient have an appointment with their PCP within 7 days of discharge? No   What is preventing the patient from scheduling follow up appointments within 7 days of discharge? Haven't had time   Nursing Interventions Advised patient to make appointment   Has the patient kept scheduled appointments due by today? N/A   Has home health visited the patient within 72 hours of discharge? N/A   Psychosocial issues? No   What is the patient's perception of their health status since discharge? Improving   Week 3 Call Completed? Yes   Graduated Yes   Call end time 1219            Lynnette JULIEN - Registered Nurse

## 2023-12-15 RX ORDER — SUMATRIPTAN 50 MG/1
TABLET, FILM COATED ORAL
Qty: 18 TABLET | Refills: 5 | Status: SHIPPED | OUTPATIENT
Start: 2023-12-15

## 2023-12-18 ENCOUNTER — HOSPITAL ENCOUNTER (INPATIENT)
Facility: HOSPITAL | Age: 83
LOS: 6 days | Discharge: HOME HEALTH | End: 2023-12-24
Attending: EMERGENCY MEDICINE | Admitting: INTERNAL MEDICINE
Payer: MEDICARE

## 2023-12-18 ENCOUNTER — APPOINTMENT (OUTPATIENT)
Dept: GENERAL RADIOLOGY | Facility: HOSPITAL | Age: 83
End: 2023-12-18
Payer: MEDICARE

## 2023-12-18 ENCOUNTER — APPOINTMENT (OUTPATIENT)
Dept: CT IMAGING | Facility: HOSPITAL | Age: 83
End: 2023-12-18
Payer: MEDICARE

## 2023-12-18 DIAGNOSIS — R41.82 ALTERED MENTAL STATUS, UNSPECIFIED ALTERED MENTAL STATUS TYPE: ICD-10-CM

## 2023-12-18 DIAGNOSIS — Z78.9 IMPAIRED MOBILITY AND ADLS: ICD-10-CM

## 2023-12-18 DIAGNOSIS — Z74.09 IMPAIRED MOBILITY AND ADLS: ICD-10-CM

## 2023-12-18 DIAGNOSIS — E87.6 HYPOKALEMIA: ICD-10-CM

## 2023-12-18 DIAGNOSIS — Z74.09 IMPAIRED MOBILITY: ICD-10-CM

## 2023-12-18 DIAGNOSIS — N17.9 ACUTE RENAL FAILURE, UNSPECIFIED ACUTE RENAL FAILURE TYPE: Primary | ICD-10-CM

## 2023-12-18 DIAGNOSIS — K52.9 COLITIS: ICD-10-CM

## 2023-12-18 DIAGNOSIS — N39.0 URINARY TRACT INFECTION WITHOUT HEMATURIA, SITE UNSPECIFIED: ICD-10-CM

## 2023-12-18 PROBLEM — G43.901 MIGRAINE WITH STATUS MIGRAINOSUS, NOT INTRACTABLE: Status: ACTIVE | Noted: 2022-08-30

## 2023-12-18 LAB
ALBUMIN SERPL-MCNC: 2.5 G/DL (ref 3.5–5.2)
ALBUMIN/GLOB SERPL: 0.7 G/DL
ALP SERPL-CCNC: 76 U/L (ref 39–117)
ALT SERPL W P-5'-P-CCNC: <5 U/L (ref 1–33)
ANION GAP SERPL CALCULATED.3IONS-SCNC: 25 MMOL/L (ref 5–15)
AST SERPL-CCNC: 10 U/L (ref 1–32)
BACTERIA UR QL AUTO: ABNORMAL /HPF
BASOPHILS # BLD AUTO: 0.04 10*3/MM3 (ref 0–0.2)
BASOPHILS NFR BLD AUTO: 0.2 % (ref 0–1.5)
BILIRUB SERPL-MCNC: <0.2 MG/DL (ref 0–1.2)
BILIRUB UR QL STRIP: NEGATIVE
BUN SERPL-MCNC: 41 MG/DL (ref 8–23)
BUN/CREAT SERPL: 19.2 (ref 7–25)
CALCIUM SPEC-SCNC: 7.7 MG/DL (ref 8.6–10.5)
CHLORIDE SERPL-SCNC: 104 MMOL/L (ref 98–107)
CK SERPL-CCNC: 50 U/L (ref 20–180)
CLARITY UR: ABNORMAL
CO2 SERPL-SCNC: 11 MMOL/L (ref 22–29)
COLOR UR: YELLOW
CREAT SERPL-MCNC: 2.14 MG/DL (ref 0.57–1)
D-LACTATE SERPL-SCNC: 1.5 MMOL/L (ref 0.5–2)
D-LACTATE SERPL-SCNC: 2.1 MMOL/L (ref 0.5–2)
DEPRECATED RDW RBC AUTO: 55.7 FL (ref 37–54)
EGFRCR SERPLBLD CKD-EPI 2021: 22.8 ML/MIN/1.73
EOSINOPHIL # BLD AUTO: 0.02 10*3/MM3 (ref 0–0.4)
EOSINOPHIL NFR BLD AUTO: 0.1 % (ref 0.3–6.2)
ERYTHROCYTE [DISTWIDTH] IN BLOOD BY AUTOMATED COUNT: 16.4 % (ref 12.3–15.4)
GLOBULIN UR ELPH-MCNC: 3.7 GM/DL
GLUCOSE SERPL-MCNC: 104 MG/DL (ref 65–99)
GLUCOSE UR STRIP-MCNC: NEGATIVE MG/DL
HCT VFR BLD AUTO: 32.6 % (ref 34–46.6)
HGB BLD-MCNC: 10.1 G/DL (ref 12–15.9)
HGB UR QL STRIP.AUTO: NEGATIVE
HYALINE CASTS UR QL AUTO: ABNORMAL /LPF
IMM GRANULOCYTES # BLD AUTO: 0.16 10*3/MM3 (ref 0–0.05)
IMM GRANULOCYTES NFR BLD AUTO: 0.7 % (ref 0–0.5)
INR PPP: 3.52 (ref 0.91–1.09)
KETONES UR QL STRIP: ABNORMAL
LEUKOCYTE ESTERASE UR QL STRIP.AUTO: ABNORMAL
LYMPHOCYTES # BLD AUTO: 1.54 10*3/MM3 (ref 0.7–3.1)
LYMPHOCYTES NFR BLD AUTO: 6.8 % (ref 19.6–45.3)
MAGNESIUM SERPL-MCNC: 1.7 MG/DL (ref 1.6–2.4)
MCH RBC QN AUTO: 28.9 PG (ref 26.6–33)
MCHC RBC AUTO-ENTMCNC: 31 G/DL (ref 31.5–35.7)
MCV RBC AUTO: 93.4 FL (ref 79–97)
MONOCYTES # BLD AUTO: 1.09 10*3/MM3 (ref 0.1–0.9)
MONOCYTES NFR BLD AUTO: 4.8 % (ref 5–12)
NEUTROPHILS NFR BLD AUTO: 19.82 10*3/MM3 (ref 1.7–7)
NEUTROPHILS NFR BLD AUTO: 87.4 % (ref 42.7–76)
NITRITE UR QL STRIP: NEGATIVE
NRBC BLD AUTO-RTO: 0 /100 WBC (ref 0–0.2)
PH UR STRIP.AUTO: 6 [PH] (ref 5–8)
PLATELET # BLD AUTO: 223 10*3/MM3 (ref 140–450)
PMV BLD AUTO: 11.8 FL (ref 6–12)
POTASSIUM SERPL-SCNC: 2.2 MMOL/L (ref 3.5–5.2)
PROCALCITONIN SERPL-MCNC: 2.14 NG/ML (ref 0–0.25)
PROT SERPL-MCNC: 6.2 G/DL (ref 6–8.5)
PROT UR QL STRIP: ABNORMAL
PROTHROMBIN TIME: 35.9 SECONDS (ref 11.8–14.8)
RBC # BLD AUTO: 3.49 10*6/MM3 (ref 3.77–5.28)
RBC # UR STRIP: ABNORMAL /HPF
REF LAB TEST METHOD: ABNORMAL
SODIUM SERPL-SCNC: 140 MMOL/L (ref 136–145)
SP GR UR STRIP: 1.02 (ref 1–1.03)
SQUAMOUS #/AREA URNS HPF: ABNORMAL /HPF
UROBILINOGEN UR QL STRIP: ABNORMAL
WBC # UR STRIP: ABNORMAL /HPF
WBC NRBC COR # BLD AUTO: 22.67 10*3/MM3 (ref 3.4–10.8)

## 2023-12-18 PROCEDURE — 70450 CT HEAD/BRAIN W/O DYE: CPT

## 2023-12-18 PROCEDURE — 36415 COLL VENOUS BLD VENIPUNCTURE: CPT | Performed by: EMERGENCY MEDICINE

## 2023-12-18 PROCEDURE — 85025 COMPLETE CBC W/AUTO DIFF WBC: CPT | Performed by: EMERGENCY MEDICINE

## 2023-12-18 PROCEDURE — 80053 COMPREHEN METABOLIC PANEL: CPT | Performed by: EMERGENCY MEDICINE

## 2023-12-18 PROCEDURE — 81001 URINALYSIS AUTO W/SCOPE: CPT | Performed by: EMERGENCY MEDICINE

## 2023-12-18 PROCEDURE — 84145 PROCALCITONIN (PCT): CPT | Performed by: EMERGENCY MEDICINE

## 2023-12-18 PROCEDURE — 25810000003 LACTATED RINGERS SOLUTION: Performed by: EMERGENCY MEDICINE

## 2023-12-18 PROCEDURE — 83735 ASSAY OF MAGNESIUM: CPT | Performed by: EMERGENCY MEDICINE

## 2023-12-18 PROCEDURE — 25010000002 CEFTRIAXONE PER 250 MG: Performed by: EMERGENCY MEDICINE

## 2023-12-18 PROCEDURE — 87040 BLOOD CULTURE FOR BACTERIA: CPT | Performed by: EMERGENCY MEDICINE

## 2023-12-18 PROCEDURE — 25010000002 POTASSIUM CHLORIDE PER 2 MEQ: Performed by: EMERGENCY MEDICINE

## 2023-12-18 PROCEDURE — 25810000003 LACTATED RINGERS PER 1000 ML: Performed by: INTERNAL MEDICINE

## 2023-12-18 PROCEDURE — 72170 X-RAY EXAM OF PELVIS: CPT

## 2023-12-18 PROCEDURE — 83605 ASSAY OF LACTIC ACID: CPT | Performed by: EMERGENCY MEDICINE

## 2023-12-18 PROCEDURE — P9612 CATHETERIZE FOR URINE SPEC: HCPCS

## 2023-12-18 PROCEDURE — 87086 URINE CULTURE/COLONY COUNT: CPT | Performed by: EMERGENCY MEDICINE

## 2023-12-18 PROCEDURE — 99285 EMERGENCY DEPT VISIT HI MDM: CPT

## 2023-12-18 PROCEDURE — 85610 PROTHROMBIN TIME: CPT | Performed by: EMERGENCY MEDICINE

## 2023-12-18 PROCEDURE — 71045 X-RAY EXAM CHEST 1 VIEW: CPT

## 2023-12-18 PROCEDURE — 82550 ASSAY OF CK (CPK): CPT | Performed by: EMERGENCY MEDICINE

## 2023-12-18 RX ORDER — ONDANSETRON 2 MG/ML
4 INJECTION INTRAMUSCULAR; INTRAVENOUS EVERY 6 HOURS PRN
Status: DISCONTINUED | OUTPATIENT
Start: 2023-12-18 | End: 2023-12-24 | Stop reason: HOSPADM

## 2023-12-18 RX ORDER — CYCLOBENZAPRINE HCL 10 MG
10 TABLET ORAL 2 TIMES DAILY
Status: DISCONTINUED | OUTPATIENT
Start: 2023-12-18 | End: 2023-12-24 | Stop reason: HOSPADM

## 2023-12-18 RX ORDER — SODIUM CHLORIDE 0.9 % (FLUSH) 0.9 %
10 SYRINGE (ML) INJECTION AS NEEDED
Status: DISCONTINUED | OUTPATIENT
Start: 2023-12-18 | End: 2023-12-24 | Stop reason: HOSPADM

## 2023-12-18 RX ORDER — SODIUM CHLORIDE, SODIUM LACTATE, POTASSIUM CHLORIDE, CALCIUM CHLORIDE 600; 310; 30; 20 MG/100ML; MG/100ML; MG/100ML; MG/100ML
50 INJECTION, SOLUTION INTRAVENOUS CONTINUOUS
Status: DISCONTINUED | OUTPATIENT
Start: 2023-12-18 | End: 2023-12-20

## 2023-12-18 RX ORDER — PANTOPRAZOLE SODIUM 40 MG/1
40 TABLET, DELAYED RELEASE ORAL
Status: DISCONTINUED | OUTPATIENT
Start: 2023-12-19 | End: 2023-12-19

## 2023-12-18 RX ORDER — ATORVASTATIN CALCIUM 40 MG/1
40 TABLET, FILM COATED ORAL DAILY
Status: DISCONTINUED | OUTPATIENT
Start: 2023-12-18 | End: 2023-12-24 | Stop reason: HOSPADM

## 2023-12-18 RX ORDER — NITROGLYCERIN 0.4 MG/1
0.4 TABLET SUBLINGUAL
Status: DISCONTINUED | OUTPATIENT
Start: 2023-12-18 | End: 2023-12-24 | Stop reason: HOSPADM

## 2023-12-18 RX ORDER — SODIUM CHLORIDE 0.9 % (FLUSH) 0.9 %
10 SYRINGE (ML) INJECTION EVERY 12 HOURS SCHEDULED
Status: DISCONTINUED | OUTPATIENT
Start: 2023-12-18 | End: 2023-12-24 | Stop reason: HOSPADM

## 2023-12-18 RX ORDER — DONEPEZIL HYDROCHLORIDE 10 MG/1
10 TABLET, FILM COATED ORAL NIGHTLY
Status: DISCONTINUED | OUTPATIENT
Start: 2023-12-18 | End: 2023-12-24 | Stop reason: HOSPADM

## 2023-12-18 RX ORDER — POTASSIUM CHLORIDE 29.8 MG/ML
20 INJECTION INTRAVENOUS ONCE
Status: COMPLETED | OUTPATIENT
Start: 2023-12-18 | End: 2023-12-18

## 2023-12-18 RX ORDER — ACETAMINOPHEN 325 MG/1
650 TABLET ORAL EVERY 4 HOURS PRN
Status: DISCONTINUED | OUTPATIENT
Start: 2023-12-18 | End: 2023-12-24 | Stop reason: HOSPADM

## 2023-12-18 RX ORDER — POTASSIUM CHLORIDE 750 MG/1
40 CAPSULE, EXTENDED RELEASE ORAL ONCE
Status: COMPLETED | OUTPATIENT
Start: 2023-12-18 | End: 2023-12-18

## 2023-12-18 RX ORDER — SODIUM CHLORIDE 9 MG/ML
40 INJECTION, SOLUTION INTRAVENOUS AS NEEDED
Status: DISCONTINUED | OUTPATIENT
Start: 2023-12-18 | End: 2023-12-24 | Stop reason: HOSPADM

## 2023-12-18 RX ORDER — CHOLECALCIFEROL (VITAMIN D3) 125 MCG
9000 CAPSULE ORAL AS NEEDED
Status: DISCONTINUED | OUTPATIENT
Start: 2023-12-18 | End: 2023-12-24 | Stop reason: HOSPADM

## 2023-12-18 RX ADMIN — CYCLOBENZAPRINE 10 MG: 10 TABLET, FILM COATED ORAL at 20:19

## 2023-12-18 RX ADMIN — POTASSIUM CHLORIDE 20 MEQ: 29.8 INJECTION, SOLUTION INTRAVENOUS at 14:50

## 2023-12-18 RX ADMIN — SODIUM CHLORIDE, POTASSIUM CHLORIDE, SODIUM LACTATE AND CALCIUM CHLORIDE 500 ML: 600; 310; 30; 20 INJECTION, SOLUTION INTRAVENOUS at 14:50

## 2023-12-18 RX ADMIN — POTASSIUM CHLORIDE 40 MEQ: 10 CAPSULE, COATED, EXTENDED RELEASE ORAL at 20:18

## 2023-12-18 RX ADMIN — Medication 10 ML: at 20:19

## 2023-12-18 RX ADMIN — CEFTRIAXONE SODIUM 2000 MG: 2 INJECTION, POWDER, FOR SOLUTION INTRAMUSCULAR; INTRAVENOUS at 17:09

## 2023-12-18 RX ADMIN — SODIUM CHLORIDE, POTASSIUM CHLORIDE, SODIUM LACTATE AND CALCIUM CHLORIDE 100 ML/HR: 600; 310; 30; 20 INJECTION, SOLUTION INTRAVENOUS at 18:22

## 2023-12-18 RX ADMIN — ATORVASTATIN CALCIUM 40 MG: 40 TABLET ORAL at 20:19

## 2023-12-18 RX ADMIN — SODIUM CHLORIDE, POTASSIUM CHLORIDE, SODIUM LACTATE AND CALCIUM CHLORIDE 500 ML: 600; 310; 30; 20 INJECTION, SOLUTION INTRAVENOUS at 13:15

## 2023-12-18 RX ADMIN — DONEPEZIL HYDROCHLORIDE 10 MG: 10 TABLET, FILM COATED ORAL at 20:19
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
## 2023-12-18 NOTE — H&P
[facility] Medicine Services  HISTORY AND PHYSICAL    Date of Admission: 2023  Primary Care Physician: Olivia Mora APRN    Subjective   Primary Historian: Patient and her     Chief Complaint: Fall, confusion    History of Present Illness  Patient is an 83-year-old woman with multiple comorbidities including depression, anxiety, fibromyalgia, insomnia, essential hypertension.  She is on Aricept but there is no diagnosis of dementia and patient and her  deny this.  She presents with complaints of a fall today and worsening confusion.    Patient's  and the patient are poor historians. However they report that patient fell today so they brought her to the emergency room. She has not been eating much and they report she had some diarrhea.  Patient is confused and irritable.  She has a leukocytosis in the ER and urinalysis was suggestive of UTI.  She also had elevated creatinine consistent with TOMÁS as well as hypokalemia. She was recommended for admission.    Review of Systems   Otherwise complete ROS reviewed and negative except as mentioned in the HPI.    Past Medical History:   Past Medical History:   Diagnosis Date    Anxiety     Arthritis     Bronchitis     Cancer     uterine    Chronic pain     Depression     Disease of thyroid gland     Fibromyalgia     GERD (gastroesophageal reflux disease)     Headache     History of transfusion     AS     Hyperlipidemia     Hypertension     Incontinence     Insomnia     Leg pain     Lumbar stenosis     Migraines     Peptic ulcer     Restless legs     Sleep apnea     NO C-PAP    UTI (urinary tract infection)     Vaginal bleeding      Past Surgical History:  Past Surgical History:   Procedure Laterality Date    BLADDER REPAIR      MESH HAD TO BE REMOVED IN 2013    BREAST BIOPSY Right 2017    benign    BREAST CYST EXCISION Left     CARDIAC CATHETERIZATION      CARPAL TUNNEL RELEASE      CATARACT  EXTRACTION W/ INTRAOCULAR LENS  IMPLANT, BILATERAL      CHOLECYSTECTOMY WITH INTRAOPERATIVE CHOLANGIOGRAM N/A 9/7/2023    Procedure: CHOLECYSTECTOMY LAPAROSCOPIC INTRAOPERATIVE CHOLANGIOGRAM;  Surgeon: Gerardo Arciniega MD;  Location: [facility] OR;  Service: General;  Laterality: N/A;    COLONOSCOPY      COLONOSCOPY N/A 10/01/2021    Procedure: COLONOSCOPY WITH ANESTHESIA;  Surgeon: Tom Velasco DO;  Location: [facility] ENDOSCOPY;  Service: Gastroenterology;  Laterality: N/A;  pre: change in bowel habits  post: diverticulosis. hemorrhoids.   Olivia Mora APRN        CYSTECTOMY      D & C HYSTEROSCOPY N/A 11/06/2017    Procedure: DILATATION AND CURETTAGE HYSTEROSCOPY;  Surgeon: Shasta Madrigal MD;  Location: [facility] OR;  Service:     DILATION AND CURETTAGE, DIAGNOSTIC / THERAPEUTIC  2008    ENDOSCOPY  09/23/2010    Short segment of Arriola's,Moderate chroninc esophagogastritis and negative H.pylori    ENDOSCOPY N/A 09/25/2017    Procedure: ESOPHAGOGASTRODUODENOSCOPY WITH ANESTHESIA;  Surgeon: Tom Velasco DO;  Location: [facility] ENDOSCOPY;  Service:     EYE SURGERY      RETINA    HEMORRHOIDECTOMY SIGMOIDOSCOPY N/A 3/21/2023    Procedure: HEMORRHOIDECTOMY WITH EXAM UNDER ANESTHESIA;  Surgeon: Holly Chavez MD;  Location: [facility] OR;  Service: General;  Laterality: N/A;    HYSTERECTOMY  12/20/2017    ORIF TIBIA/FIBULA FRACTURES Left 2000    TRANSVAGINAL TAPING SUSPENSION N/A 11/06/2017    Procedure: VAGINAL MESH REVISION;  Surgeon: Shasta Madrigal MD;  Location: [facility] OR;  Service:     VAGINAL MESH REVISION  2013     Social History:  reports that she has never smoked. She has never used smokeless tobacco. She reports that she does not currently use alcohol. She reports that she does not use drugs.    Family History: family history includes Cancer in her paternal grandmother; Diabetes in her mother and sister; Lung cancer in her paternal grandfather; Lymphoma in her brother; Multiple myeloma in her  mother; Ovarian cancer in her paternal aunt; Prostate cancer in her brother; Stroke in her father.       Allergies:  Allergies   Allergen Reactions    Ropinirole Hcl Shortness Of Breath    Codeine Itching and Mental Status Change    Definity [Perflutren Lipid Microsphere] Other (See Comments)     Severe back pain    Ambien [Zolpidem] Other (See Comments)     HYPER     Eszopiclone Other (See Comments)     MAKES PT HYPER     Pregabalin Dizziness    Tizanidine Other (See Comments)     Terrible nightmares     Medications:  Prior to Admission medications    Medication Sig Start Date End Date Taking? Authorizing Provider   acetaminophen (TYLENOL) 325 MG tablet Take 2 tablets by mouth Every 6 (Six) Hours As Needed for Mild Pain.    Tamiko Gaviria MD   atorvastatin (LIPITOR) 40 MG tablet Take 1 tablet by mouth Daily. 1/16/23   Tamiko Gaviria MD   butalbital-acetaminophen-caffeine (FIORICET, ESGIC) -40 MG per tablet Take 1 tablet by mouth 2 (Two) Times a Day As Needed for Headache. 9/25/23   Ana Saldivar APRN   carvedilol (COREG) 12.5 MG tablet Take 1 tablet by mouth 2 (Two) Times a Day With Meals. 8/28/19   Tamiko Gaviria MD   cyclobenzaprine (FLEXERIL) 10 MG tablet Take 1 tablet by mouth 2 (Two) Times a Day. 11/17/22   Tamiko Gaviria MD   donepezil (ARICEPT) 10 MG tablet Take 1 tablet by mouth Every Night. 10/18/22   Tamiko Gaviria MD   ergocalciferol (ERGOCALCIFEROL) 60095 units capsule Take 1 capsule by mouth 1 (One) Time Per Week. Saturday 4/17/19   Tamiko Gaviria MD   LACTASE ENZYME PO Take 3 tablets by mouth As Needed (takes before dairy products).    Tamiko Gaviria MD   lansoprazole (PREVACID) 30 MG capsule Take 1 capsule by mouth Every Morning. 3/19/20   Tamiko Gaviria MD   levothyroxine (SYNTHROID, LEVOTHROID) 50 MCG tablet Take 1 tablet by mouth Every Morning.    Tamiko Gaviria MD   lidocaine (LIDODERM) 5 % Place 2 patches on the skin as directed  "by provider Daily. Remove & Discard patch within 12 hours or as directed by MD 11/9/23   Jordan Cummins MD   polyethylene glycol (MIRALAX) 17 g packet Take 17 g by mouth Daily As Needed (Constipation).    Provider, MD Tamiko   sertraline (ZOLOFT) 25 MG tablet Take 1 tablet by mouth Daily. 2/28/23 2/28/24  Shasta Madrigal MD   SUMAtriptan (IMITREX) 50 MG tablet Take 1 tablet by mouth 2 (Two) Times a Day As Needed for Migraine. 7/29/19   Shasta Madrigal MD   venlafaxine (EFFEXOR) 25 MG tablet Take 1 tablet by mouth Daily. 2/28/23   Shasta Madrigal MD     I have utilized all available immediate resources to obtain, update, or review the patient's current medications (including all prescriptions, over-the-counter products, herbals, cannabis/cannabidiol products, and vitamin/mineral/dietary (nutritional) supplements).    Objective     Vital Signs: /54 (BP Location: Right arm, Patient Position: Sitting)   Pulse 72   Resp 21   Ht 157.5 cm (62\")   Wt 70.3 kg (155 lb)   LMP  (LMP Unknown)   SpO2 100%   BMI 28.35 kg/m²   Physical Exam  Constitutional:       General: She is not in acute distress.     Appearance: She is ill-appearing. She is not diaphoretic.      Comments: Thin and chronically ill-appearing elderly woman.   HENT:      Head: Normocephalic and atraumatic.      Right Ear: External ear normal.      Left Ear: External ear normal.      Nose: No congestion or rhinorrhea.      Mouth/Throat:      Mouth: Mucous membranes are moist.      Pharynx: No oropharyngeal exudate or posterior oropharyngeal erythema.   Eyes:      General: No scleral icterus.     Extraocular Movements: Extraocular movements intact.      Conjunctiva/sclera: Conjunctivae normal.   Cardiovascular:      Rate and Rhythm: Normal rate and regular rhythm.      Heart sounds: Normal heart sounds. No murmur heard.  Pulmonary:      Effort: Pulmonary effort is normal. No respiratory distress.      Breath sounds: Normal breath sounds. " "No wheezing, rhonchi or rales.   Abdominal:      General: Abdomen is flat. There is no distension.      Palpations: Abdomen is soft.      Tenderness: There is no abdominal tenderness. There is no guarding.   Musculoskeletal:         General: No swelling, tenderness or deformity.      Cervical back: Neck supple. No rigidity. No muscular tenderness.      Right lower leg: No edema.      Left lower leg: No edema.   Lymphadenopathy:      Cervical: No cervical adenopathy.   Skin:     General: Skin is warm and dry.   Neurological:      General: No focal deficit present.      Mental Status: She is alert.      Cranial Nerves: No cranial nerve deficit.      Motor: No weakness.      Comments: Confused.   Psychiatric:      Comments: Irritable mood.  Confused.        Results Reviewed:  Lab Results (last 24 hours)       Procedure Component Value Units Date/Time    Lactic Acid, Plasma [[id number]] Collected: 12/18/23 1524    Specimen: Blood Updated: 12/18/23 1534    CK [[id number]]  (Normal) Collected: 12/18/23 1311    Specimen: Blood Updated: 12/18/23 1502     Creatine Kinase 50 U/L     Procalcitonin [[id number]]  (Abnormal) Collected: 12/18/23 1311    Specimen: Blood Updated: 12/18/23 1419     Procalcitonin 2.14 ng/mL     Narrative:      As a Marker for Sepsis (Non-Neonates):    1. <0.5 ng/mL represents a low risk of severe sepsis and/or septic shock.  2. >2 ng/mL represents a high risk of severe sepsis and/or septic shock.    As a Marker for Lower Respiratory Tract Infections that require antibiotic therapy:    PCT on Admission    Antibiotic Therapy       6-12 Hrs later    >0.5                Strongly Recommended  >0.25 - <0.5        Recommended   0.1 - 0.25          Discouraged              Remeasure/reassess PCT  <0.1                Strongly Discouraged     Remeasure/reassess PCT    As 28 day mortality risk marker: \"Change in Procalcitonin Result\" (>80% or <=80%) if Day 0 (or Day 1) and Day 4 values are available. Refer to " http://www.[email or web address]-pct-calculator.com    Change in PCT <=80%  A decrease of PCT levels below or equal to 80% defines a positive change in PCT test result representing a higher risk for 28-day all-cause mortality of patients diagnosed with severe sepsis for septic shock.    Change in PCT >80%  A decrease of PCT levels of more than 80% defines a negative change in PCT result representing a lower risk for 28-day all-cause mortality of patients diagnosed with severe sepsis or septic shock.       Comprehensive Metabolic Panel [[id number]]  (Abnormal) Collected: 12/18/23 1311    Specimen: Blood Updated: 12/18/23 1341     Glucose 104 mg/dL      BUN 41 mg/dL      Creatinine 2.14 mg/dL      Sodium 140 mmol/L      Potassium 2.2 mmol/L      Chloride 104 mmol/L      CO2 11.0 mmol/L      Calcium 7.7 mg/dL      Total Protein 6.2 g/dL      Albumin 2.5 g/dL      ALT (SGPT) <5 U/L      AST (SGOT) 10 U/L      Alkaline Phosphatase 76 U/L      Total Bilirubin <0.2 mg/dL      Globulin 3.7 gm/dL      A/G Ratio 0.7 g/dL      BUN/Creatinine Ratio 19.2     Anion Gap 25.0 mmol/L      eGFR 22.8 mL/min/1.73     Narrative:      GFR Normal >60  Chronic Kidney Disease <60  Kidney Failure <15    The GFR formula is only valid for adults with stable renal function between ages 18 and 70.    Magnesium [[id number]]  (Normal) Collected: 12/18/23 1311    Specimen: Blood Updated: 12/18/23 1335     Magnesium 1.7 mg/dL     Urinalysis, Microscopic Only - Straight Cath [[id number]]  (Abnormal) Collected: 12/18/23 1306    Specimen: Urine from Straight Cath Updated: 12/18/23 1323     RBC, UA 0-2 /HPF      WBC, UA Too Numerous to Count /HPF      Bacteria, UA 4+ /HPF      Squamous Epithelial Cells, UA 0-2 /HPF      Hyaline Casts, UA None Seen /LPF      Methodology Automated Microscopy    Urine Culture - Urine, Straight Cath [[id number]] Collected: 12/18/23 1306    Specimen: Urine from Straight Cath Updated: 12/18/23 1323    Urinalysis With Culture If Indicated -  Straight Cath [[id number]]  (Abnormal) Collected: 12/18/23 1306    Specimen: Urine from Straight Cath Updated: 12/18/23 1319     Color, UA Yellow     Appearance, UA Turbid     pH, UA 6.0     Specific Gravity, UA 1.021     Glucose, UA Negative     Ketones, UA 15 mg/dL (1+)     Bilirubin, UA Negative     Blood, UA Negative     Protein,  mg/dL (2+)     Leuk Esterase, UA Large (3+)     Nitrite, UA Negative     Urobilinogen, UA 0.2 E.U./dL    Narrative:      In absence of clinical symptoms, the presence of pyuria, bacteria, and/or nitrites on the urinalysis result does not correlate with infection.    Protime-INR [[id number]]  (Abnormal) Collected: 12/18/23 1226    Specimen: Blood Updated: 12/18/23 1241     Protime 35.9 Seconds      INR 3.52    CBC & Differential [[id number]]  (Abnormal) Collected: 12/18/23 1226    Specimen: Blood Updated: 12/18/23 1233    Narrative:      The following orders were created for panel order CBC & Differential.  Procedure                               Abnormality         Status                     ---------                               -----------         ------                     CBC Auto Differential[[id number]]        Abnormal            Final result                 Please view results for these tests on the individual orders.    CBC Auto Differential [[id number]]  (Abnormal) Collected: 12/18/23 1226    Specimen: Blood Updated: 12/18/23 1233     WBC 22.67 10*3/mm3      RBC 3.49 10*6/mm3      Hemoglobin 10.1 g/dL      Hematocrit 32.6 %      MCV 93.4 fL      MCH 28.9 pg      MCHC 31.0 g/dL      RDW 16.4 %      RDW-SD 55.7 fl      MPV 11.8 fL      Platelets 223 10*3/mm3      Neutrophil % 87.4 %      Lymphocyte % 6.8 %      Monocyte % 4.8 %      Eosinophil % 0.1 %      Basophil % 0.2 %      Immature Grans % 0.7 %      Neutrophils, Absolute 19.82 10*3/mm3      Lymphocytes, Absolute 1.54 10*3/mm3      Monocytes, Absolute 1.09 10*3/mm3      Eosinophils, Absolute 0.02 10*3/mm3      Basophils,  Absolute 0.04 10*3/mm3      Immature Grans, Absolute 0.16 10*3/mm3      nRBC 0.0 /100 WBC           Imaging Results (Last 24 Hours)       Procedure Component Value Units Date/Time    CT Head Without Contrast [[id number]] Collected: 12/18/23 1355     Updated: 12/18/23 1401    Narrative:      Exam: CT HEAD WO CONTRAST- 12/18/2023 12:41 PM     HISTORY: Altered mental status     DOSE LENGTH PRODUCT: 804 mGy cm. Automated exposure control was also  utilized to decrease patient radiation dose.     Technique:  Helically acquired CT of the brain without IV contrast was performed.  Sagittal and coronal reformations are also provided for review. Soft  tissue and bone kernels are available for interpretation.     Comparison: 11/5/2023.     Findings:     Ventricles and extra-axial CSF spaces are mildly proportionally  prominent, likely due to atrophy.     No intraparenchymal or extra-axial hemorrhage.     Gray-white matter differentiation is preserved. Moderate periventricular  white matter low-attenuation.     Orbits are grossly unremarkable. Paranasal sinuses are clear. Mastoid  air cells are clear.     No suspicious calvarial or extracranial soft tissue abnormality.     Other:None.       Impression:      Impression:       No acute intracranial abnormality.     Stable atrophy and chronic small vessel ischemic changes.     This report was signed and finalized on 12/18/2023 1:58 PM by Sunny Degroot.       XR Pelvis 1 or 2 View [[id number]] Collected: 12/18/23 1331     Updated: 12/18/23 1335    Narrative:      EXAMINATION: XR PELVIS 1 OR 2 VW-  12/18/2023 1:31 PM     HISTORY: Pelvic pain.     FINDINGS: AP radiograph of the pelvis demonstrates no acute fracture or  dislocation. The femoral heads are concentric and well located within  the acetabulum. The SI joints and pubic symphysis are intact with no  evidence of diastases.       Impression:      1. No acute fracture.     This report was signed and finalized on 12/18/2023 1:32  PM by Dr. Chi Hinds MD.       XR Chest 1 View [[id number]] Collected: 12/18/23 1237     Updated: 12/18/23 1241    Narrative:      XR CHEST 1 VW- 12/18/2023 11:23 AM     HISTORY: ams       COMPARISON: 11/5/2023     FINDINGS:  Upright frontal radiograph of the chest was obtained     Chronic elevation of the right hemidiaphragm. No lung consolidation. No  pleural effusion or pneumothorax. The cardiomediastinal silhouette and  pulmonary vascularity are within normal limits. The osseous structures  and surrounding soft tissues demonstrate no acute abnormality.       Impression:      1.  Stable chest exam without acute process. No acute infiltrate.     This report was signed and finalized on 12/18/2023 12:38 PM by Dr Tenzin Madrigal.             I have personally reviewed and interpreted the radiology studies and ECG obtained at time of admission.     Assessment / Plan   Assessment:   Active Hospital Problems    Diagnosis     **Hypokalemia     UTI (urinary tract infection)     Toxic metabolic encephalopathy     Acute renal failure superimposed on stage 3 chronic kidney disease      Treatment Plan  The patient will be admitted to my service here at [facility].  Will start aggressive IV fluids for rehydration and TOMÁS on CKD stage III.  Start IV antibiotics with ceftriaxone.  Follow-up blood cultures and urine cultures.    Replete potassium aggressively    Hold other home medications for now    DVT prophylaxis with SCDs    CODE STATUS is full code    Medical Decision Making  Number and Complexity of problems: 3 acute, severe, high complexity medical problems.  Differential Diagnosis: None    Conditions and Status        Condition is unchanged.     [facility] Data  External documents reviewed: None  Cardiac tracing (EKG, telemetry) interpretation: None  Radiology interpretation: Chest x-ray reviewed by me  Labs reviewed: CBC, BMP reviewed by me  Any tests that were considered but not ordered: None     Decision  rules/scores evaluated (example [id number], Wells, etc): None     Discussed with: Patient and her      Care Planning  Shared decision making: Patient and her   Code status and discussions: CODE STATUS is full code    Disposition  Social Determinants of Health that impact treatment or disposition: None  Estimated length of stay is 3 to 5 days     I confirmed that the patient's advanced care plan is present, code status is documented, and a surrogate decision maker is listed in the patient's medical record.     The patient's surrogate decision maker is patient's      The patient was seen and examined by me on 12/18/2023 at 3:45 PM.    Electronically signed by Cory Tirado MD, 12/18/23, 15:41 CST.

## 2023-12-19 LAB
ANION GAP SERPL CALCULATED.3IONS-SCNC: 17 MMOL/L (ref 5–15)
BACTERIA SPEC AEROBE CULT: NORMAL
BASOPHILS # BLD AUTO: 0.01 10*3/MM3 (ref 0–0.2)
BASOPHILS NFR BLD AUTO: 0.1 % (ref 0–1.5)
BUN SERPL-MCNC: 41 MG/DL (ref 8–23)
BUN/CREAT SERPL: 27 (ref 7–25)
C DIFF GDH + TOXINS A+B STL QL IA.RAPID: NEGATIVE
C DIFF TOX GENS STL QL NAA+PROBE: POSITIVE
CALCIUM SPEC-SCNC: 7.9 MG/DL (ref 8.6–10.5)
CHLORIDE SERPL-SCNC: 111 MMOL/L (ref 98–107)
CO2 SERPL-SCNC: 15 MMOL/L (ref 22–29)
CREAT SERPL-MCNC: 1.52 MG/DL (ref 0.57–1)
DEPRECATED RDW RBC AUTO: 53.2 FL (ref 37–54)
EGFRCR SERPLBLD CKD-EPI 2021: 34.3 ML/MIN/1.73
EOSINOPHIL # BLD AUTO: 0.04 10*3/MM3 (ref 0–0.4)
EOSINOPHIL NFR BLD AUTO: 0.4 % (ref 0.3–6.2)
ERYTHROCYTE [DISTWIDTH] IN BLOOD BY AUTOMATED COUNT: 16 % (ref 12.3–15.4)
GLUCOSE SERPL-MCNC: 123 MG/DL (ref 65–99)
HCT VFR BLD AUTO: 24.1 % (ref 34–46.6)
HEMOCCULT STL QL: NEGATIVE
HGB BLD-MCNC: 7.5 G/DL (ref 12–15.9)
IMM GRANULOCYTES # BLD AUTO: 0.06 10*3/MM3 (ref 0–0.05)
IMM GRANULOCYTES NFR BLD AUTO: 0.5 % (ref 0–0.5)
LYMPHOCYTES # BLD AUTO: 0.78 10*3/MM3 (ref 0.7–3.1)
LYMPHOCYTES NFR BLD AUTO: 7.1 % (ref 19.6–45.3)
MCH RBC QN AUTO: 28.2 PG (ref 26.6–33)
MCHC RBC AUTO-ENTMCNC: 31.1 G/DL (ref 31.5–35.7)
MCV RBC AUTO: 90.6 FL (ref 79–97)
MONOCYTES # BLD AUTO: 0.52 10*3/MM3 (ref 0.1–0.9)
MONOCYTES NFR BLD AUTO: 4.7 % (ref 5–12)
NEUTROPHILS NFR BLD AUTO: 87.2 % (ref 42.7–76)
NEUTROPHILS NFR BLD AUTO: 9.61 10*3/MM3 (ref 1.7–7)
NRBC BLD AUTO-RTO: 0 /100 WBC (ref 0–0.2)
PLATELET # BLD AUTO: 172 10*3/MM3 (ref 140–450)
PMV BLD AUTO: 11.9 FL (ref 6–12)
POTASSIUM SERPL-SCNC: 1.9 MMOL/L (ref 3.5–5.2)
POTASSIUM SERPL-SCNC: 2.5 MMOL/L (ref 3.5–5.2)
RBC # BLD AUTO: 2.66 10*6/MM3 (ref 3.77–5.28)
SODIUM SERPL-SCNC: 143 MMOL/L (ref 136–145)
WBC NRBC COR # BLD AUTO: 11.02 10*3/MM3 (ref 3.4–10.8)

## 2023-12-19 PROCEDURE — 25010000002 CEFTRIAXONE PER 250 MG: Performed by: INTERNAL MEDICINE

## 2023-12-19 PROCEDURE — 25010000002 MAGNESIUM SULFATE 2 GM/50ML SOLUTION: Performed by: NURSE PRACTITIONER

## 2023-12-19 PROCEDURE — 85025 COMPLETE CBC W/AUTO DIFF WBC: CPT | Performed by: INTERNAL MEDICINE

## 2023-12-19 PROCEDURE — 80048 BASIC METABOLIC PNL TOTAL CA: CPT | Performed by: INTERNAL MEDICINE

## 2023-12-19 PROCEDURE — 87493 C DIFF AMPLIFIED PROBE: CPT | Performed by: INTERNAL MEDICINE

## 2023-12-19 PROCEDURE — 25010000002 ZIPRASIDONE MESYLATE PER 10 MG: Performed by: INTERNAL MEDICINE

## 2023-12-19 PROCEDURE — 82272 OCCULT BLD FECES 1-3 TESTS: CPT | Performed by: NURSE PRACTITIONER

## 2023-12-19 PROCEDURE — 84132 ASSAY OF SERUM POTASSIUM: CPT | Performed by: NURSE PRACTITIONER

## 2023-12-19 PROCEDURE — 87449 NOS EACH ORGANISM AG IA: CPT | Performed by: INTERNAL MEDICINE

## 2023-12-19 PROCEDURE — 25010000002 POTASSIUM CHLORIDE 10 MEQ/100ML SOLUTION: Performed by: INTERNAL MEDICINE

## 2023-12-19 RX ORDER — NALOXONE HYDROCHLORIDE 4 MG/.1ML
1 SPRAY NASAL AS NEEDED
COMMUNITY

## 2023-12-19 RX ORDER — MAGNESIUM SULFATE HEPTAHYDRATE 40 MG/ML
2 INJECTION, SOLUTION INTRAVENOUS ONCE
Status: COMPLETED | OUTPATIENT
Start: 2023-12-19 | End: 2023-12-19

## 2023-12-19 RX ORDER — FAMOTIDINE 20 MG/1
20 TABLET, FILM COATED ORAL
Status: DISCONTINUED | OUTPATIENT
Start: 2023-12-19 | End: 2023-12-19

## 2023-12-19 RX ORDER — OXYCODONE HYDROCHLORIDE 15 MG/1
15 TABLET, FILM COATED, EXTENDED RELEASE ORAL 4 TIMES DAILY
COMMUNITY

## 2023-12-19 RX ORDER — ZIPRASIDONE MESYLATE 20 MG/ML
10 INJECTION, POWDER, LYOPHILIZED, FOR SOLUTION INTRAMUSCULAR ONCE
Status: COMPLETED | OUTPATIENT
Start: 2023-12-19 | End: 2023-12-19

## 2023-12-19 RX ORDER — POTASSIUM CHLORIDE 7.45 MG/ML
10 INJECTION INTRAVENOUS
Qty: 600 ML | Refills: 0 | Status: COMPLETED | OUTPATIENT
Start: 2023-12-19 | End: 2023-12-19

## 2023-12-19 RX ORDER — BISACODYL 5 MG/1
5 TABLET, DELAYED RELEASE ORAL DAILY PRN
Status: DISCONTINUED | OUTPATIENT
Start: 2023-12-19 | End: 2023-12-19

## 2023-12-19 RX ORDER — POTASSIUM CHLORIDE 750 MG/1
40 CAPSULE, EXTENDED RELEASE ORAL EVERY 4 HOURS
Status: COMPLETED | OUTPATIENT
Start: 2023-12-19 | End: 2023-12-20

## 2023-12-19 RX ORDER — POLYETHYLENE GLYCOL 3350 17 G/17G
17 POWDER, FOR SOLUTION ORAL DAILY PRN
Status: DISCONTINUED | OUTPATIENT
Start: 2023-12-19 | End: 2023-12-19

## 2023-12-19 RX ORDER — MONTELUKAST SODIUM 4 MG/1
1 TABLET, CHEWABLE ORAL 2 TIMES DAILY
COMMUNITY

## 2023-12-19 RX ORDER — BISACODYL 10 MG
10 SUPPOSITORY, RECTAL RECTAL DAILY PRN
Status: DISCONTINUED | OUTPATIENT
Start: 2023-12-19 | End: 2023-12-19

## 2023-12-19 RX ORDER — VANCOMYCIN HYDROCHLORIDE 125 MG/1
125 CAPSULE ORAL EVERY 6 HOURS SCHEDULED
Status: DISCONTINUED | OUTPATIENT
Start: 2023-12-19 | End: 2023-12-24 | Stop reason: HOSPADM

## 2023-12-19 RX ORDER — FAMOTIDINE 20 MG/1
20 TABLET, FILM COATED ORAL DAILY
Status: DISCONTINUED | OUTPATIENT
Start: 2023-12-20 | End: 2023-12-24 | Stop reason: HOSPADM

## 2023-12-19 RX ORDER — ATORVASTATIN CALCIUM 40 MG/1
40 TABLET, FILM COATED ORAL DAILY
COMMUNITY

## 2023-12-19 RX ORDER — AMOXICILLIN 250 MG
2 CAPSULE ORAL 2 TIMES DAILY
Status: DISCONTINUED | OUTPATIENT
Start: 2023-12-19 | End: 2023-12-19

## 2023-12-19 RX ORDER — LEVOTHYROXINE SODIUM 0.05 MG/1
50 TABLET ORAL DAILY
COMMUNITY

## 2023-12-19 RX ADMIN — MAGNESIUM SULFATE HEPTAHYDRATE 2 G: 2 INJECTION, SOLUTION INTRAVENOUS at 11:50

## 2023-12-19 RX ADMIN — VANCOMYCIN HYDROCHLORIDE 125 MG: 125 CAPSULE ORAL at 15:48

## 2023-12-19 RX ADMIN — POTASSIUM CHLORIDE 10 MEQ: 7.46 INJECTION, SOLUTION INTRAVENOUS at 10:30

## 2023-12-19 RX ADMIN — POTASSIUM CHLORIDE 10 MEQ: 7.46 INJECTION, SOLUTION INTRAVENOUS at 12:37

## 2023-12-19 RX ADMIN — CYCLOBENZAPRINE 10 MG: 10 TABLET, FILM COATED ORAL at 22:33

## 2023-12-19 RX ADMIN — ATORVASTATIN CALCIUM 40 MG: 40 TABLET ORAL at 09:36

## 2023-12-19 RX ADMIN — POTASSIUM CHLORIDE 10 MEQ: 7.46 INJECTION, SOLUTION INTRAVENOUS at 17:10

## 2023-12-19 RX ADMIN — ZIPRASIDONE MESYLATE 10 MG: 20 INJECTION, POWDER, LYOPHILIZED, FOR SOLUTION INTRAMUSCULAR at 04:42

## 2023-12-19 RX ADMIN — POTASSIUM CHLORIDE 10 MEQ: 7.46 INJECTION, SOLUTION INTRAVENOUS at 15:48

## 2023-12-19 RX ADMIN — POTASSIUM CHLORIDE 10 MEQ: 7.46 INJECTION, SOLUTION INTRAVENOUS at 14:48

## 2023-12-19 RX ADMIN — VANCOMYCIN HYDROCHLORIDE 125 MG: 125 CAPSULE ORAL at 09:36

## 2023-12-19 RX ADMIN — Medication 10 ML: at 22:33

## 2023-12-19 RX ADMIN — POTASSIUM CHLORIDE 10 MEQ: 7.46 INJECTION, SOLUTION INTRAVENOUS at 11:40

## 2023-12-19 RX ADMIN — DONEPEZIL HYDROCHLORIDE 10 MG: 10 TABLET, FILM COATED ORAL at 22:33

## 2023-12-19 RX ADMIN — CYCLOBENZAPRINE 10 MG: 10 TABLET, FILM COATED ORAL at 09:36

## 2023-12-19 RX ADMIN — Medication 10 ML: at 09:36

## 2023-12-19 RX ADMIN — VANCOMYCIN HYDROCHLORIDE 125 MG: 125 CAPSULE ORAL at 22:33

## 2023-12-19 RX ADMIN — PANTOPRAZOLE SODIUM 40 MG: 40 TABLET, DELAYED RELEASE ORAL at 05:04

## 2023-12-19 RX ADMIN — CEFTRIAXONE 1000 MG: 1 INJECTION, POWDER, FOR SOLUTION INTRAMUSCULAR; INTRAVENOUS at 13:58

## 2023-12-19 RX ADMIN — POTASSIUM CHLORIDE 40 MEQ: 750 CAPSULE, EXTENDED RELEASE ORAL at 22:51

## 2023-12-19 NOTE — CONSULTS
"Pharmacy Consult   C. difficile Consult    Assessment/Action/Plan:  Pharmacy Consult for C. difficile. Protonix 40 mg oral Daily changed to Pepcid 20 mg oral BID. There were no antiperistaltic agents/stool softeners/laxatives on profile.  Pharmacy will continue to monitor daily and make further adjustment(s) accordingly.     Subjective:  Jennifer Gomes is a 83 y.o. female     Objective:  Ht: 157.2 cm (61.89\"); Wt: 65.3 kg (143 lb 15.4 oz)  Estimated Creatinine Clearance: 18.2 mL/min (A) (by C-G formula based on SCr of 2.14 mg/dL (H)).   Creatinine   Date Value Ref Range Status   12/18/2023 2.14 (H) 0.57 - 1.00 mg/dL Final      Lab Results   Component Value Date    WBC 22.67 (H) 12/18/2023      Baseline culture results:  Microbiology Results (last 10 days)       Procedure Component Value - Date/Time    Clostridioides difficile Toxin - Stool, Per Rectum [[id number]]  (Abnormal) Collected: 12/19/23 0425    Lab Status: Final result Specimen: Stool from Per Rectum Updated: 12/19/23 0636    Narrative:      The following orders were created for panel order Clostridioides difficile Toxin - Stool, Per Rectum.  Procedure                               Abnormality         Status                     ---------                               -----------         ------                     Clostridioides difficile...[[id number]]  Abnormal            Final result                 Please view results for these tests on the individual orders.    Clostridioides difficile Toxin, PCR - Stool, Per Rectum [[id number]]  (Abnormal) Collected: 12/19/23 0425    Lab Status: Final result Specimen: Stool from Per Rectum Updated: 12/19/23 0636     Toxigenic C. difficile by PCR Positive    Narrative:      DNA from a toxigenic strain of C.difficile has been detected. Antigen testing for the presence of free C.difficile toxin is currently in progress, to help determine the clinical significance of this PCR result.     Clostridioides difficile toxin Ag, " Reflex - Stool, Per Rectum [[id number]]  (Normal) Collected: 12/19/23 0425    Lab Status: Final result Specimen: Stool from Per Rectum Updated: 12/19/23 0637     C.diff Toxin Ag Negative    Narrative:      DNA from a toxigenic strain of C.difficile was detected, although the free toxin itself was not detected. These findings are consistent with C.difficile colonization and may not reflect actual C.difficile infection. Clinical correlation needed.            Brandon Milner, PharmD  12/19/23 06:56 CST

## 2023-12-19 NOTE — CASE MANAGEMENT/SOCIAL WORK
Discharge Planning Assessment  [facility]     Patient Name: Jennifer Gomes  MRN: [id number]  Today's Date: 12/19/2023    Admit Date: 12/18/2023        Discharge Needs Assessment       Row Name 12/19/23 1503       Living Environment    People in Home spouse    Current Living Arrangements home    Primary Care Provided by spouse/significant other;child(sebastian)    Family Caregiver if Needed child(sebastian), adult    Quality of Family Relationships unable to assess    Able to Return to Prior Arrangements other (see comments)       Resource/Environmental Concerns    Resource/Environmental Concerns none       Transition Planning    Patient/Family Anticipates Transition to other (see comments)    Patient/Family Anticipated Services at Transition other (see comments)    Transportation Anticipated family or friend will provide       Discharge Needs Assessment    Concerns to be Addressed adjustment to diagnosis/illness;discharge planning;home safety;other (see comments)    Discharge Coordination/Progress Patient lives with her  but has been confused since admission. APS report was made and APS has accepted report. APS worker, Laureano Naranjo, has been assigned to case and will evaluated patient here at [facility]. Will follow for direction from APS as far as decision maker, further involvement, home environment, etc. PT/OT have been ordered and are currently pending. Definite discharge planning disposition is TBD.             MICHAEL Tyler

## 2023-12-19 NOTE — PROGRESS NOTES
"Pharmacy Dosing Service  Automatic Renal Adjustment  Famotidine    Assessment/Action/Plan:  Based on prescribing information, Famotidine 20mg PO BID has been adjusted to Famotidine 20mg PO daily. Pharmacy will continue to monitor and make further adjustment(s) accordingly.     Subjective:  Jennifer Gomes is a 83 y.o. female     Objective:  Ht: 157.2 cm (61.89\"); Wt: 65.3 kg (143 lb 15.4 oz)  Estimated Creatinine Clearance: 25.7 mL/min (A) (by C-G formula based on SCr of 1.52 mg/dL (H)).     Creatinine   Date Value Ref Range Status   12/19/2023 1.52 (H) 0.57 - 1.00 mg/dL Final   12/18/2023 2.14 (H) 0.57 - 1.00 mg/dL Final   11/08/2023 0.67 0.57 - 1.00 mg/dL Final   05/03/2022 0.9 0.5 - 0.9 mg/dL Final   02/01/2022 0.9 0.5 - 0.9 mg/dL Final   10/15/2021 0.9 0.5 - 0.9 mg/dL Final       Ignacio Bowen, PharmD  12/19/23 10:07 CST    "

## 2023-12-19 NOTE — PROGRESS NOTES
[facility] Medicine Services  INPATIENT PROGRESS NOTE    Patient Name: Jennifer Gomes  Date of Admission: 12/18/2023  Today's Date: 12/19/23  Length of Stay: 1  Primary Care Physician: Olivia Mora APRN    Subjective   Chief Complaint: follow up confusion  HPI   She was lying in bed asleep.  Patient was restless and combative overnight.  She was given a dose of IM Geodon this morning.    She was asleep upon arrival and awoke easily to her name.  She is able to tell me her name and with cues hospital.  She denies nausea, vomiting or abdominal pain.  Reportedly she had 1 BM before RN shift change.  C. difficile PCR is positive, C. Difficile toxin antigen is negative.  Afebrile and WBC has improved from 22 down to 11.    Review of Systems   All pertinent negatives and positives are as above. All other systems have been reviewed and are negative unless otherwise stated.     Objective    Temp:  [97 °F (36.1 °C)-99 °F (37.2 °C)] 98.4 °F (36.9 °C)  Heart Rate:  [72-88] 79  Resp:  [18-21] 18  BP: (112-143)/(39-73) 125/39  Physical Exam  Vitals reviewed.   Constitutional:       General: She is not in acute distress.     Appearance: She is ill-appearing. She is not toxic-appearing.      Comments: Lying in bed.  No acute distress.  On room air.  No family at bedside.  Discussed with her nurse jessica.   HENT:      Head: Normocephalic and atraumatic.      Mouth/Throat:      Mouth: Mucous membranes are moist.      Pharynx: Oropharynx is clear.   Eyes:      Extraocular Movements: Extraocular movements intact.      Conjunctiva/sclera: Conjunctivae normal.      Pupils: Pupils are equal, round, and reactive to light.   Cardiovascular:      Rate and Rhythm: Normal rate and regular rhythm.      Pulses: Normal pulses.   Pulmonary:      Effort: Pulmonary effort is normal. No respiratory distress.      Breath sounds: Normal breath sounds. No wheezing or rhonchi.   Abdominal:      General: Bowel  "sounds are normal. There is no distension.      Palpations: Abdomen is soft.      Tenderness: There is no abdominal tenderness.   Musculoskeletal:         General: No swelling or tenderness. Normal range of motion.      Cervical back: Normal range of motion and neck supple. No muscular tenderness.   Skin:     General: Skin is warm and dry.      Findings: No erythema or rash.   Neurological:      General: No focal deficit present.      Mental Status: She is alert.      Cranial Nerves: No cranial nerve deficit.      Motor: No weakness.      Comments: Oriented to person.  With cues can tell me hospital.  Moves all extremities, follows commands.   Psychiatric:         Mood and Affect: Mood normal.         Behavior: Behavior normal.       Results Review:  I have reviewed the labs, radiology results, and diagnostic studies.    Laboratory Data:   Results from last 7 days   Lab Units 12/18/23  1226   WBC 10*3/mm3 22.67*   HEMOGLOBIN g/dL 10.1*   HEMATOCRIT % 32.6*   PLATELETS 10*3/mm3 223        Results from last 7 days   Lab Units 12/18/23  1311   SODIUM mmol/L 140   POTASSIUM mmol/L 2.2*   CHLORIDE mmol/L 104   CO2 mmol/L 11.0*   BUN mg/dL 41*   CREATININE mg/dL 2.14*   CALCIUM mg/dL 7.7*   BILIRUBIN mg/dL <0.2   ALK PHOS U/L 76   ALT (SGPT) U/L <5   AST (SGOT) U/L 10   GLUCOSE mg/dL 104*       Culture Data:   No results found for: \"ACANTHNAEG\", \"AFBCX\", \"BPERTUSSISCX\", \"BLOODCX\"  No results found for: \"BCIDPCR\", \"CXREFLEX\", \"CSFCX\", \"CULTURETIS\"  No results found for: \"CULTURES\", \"HSVCX\", \"URCX\"  No results found for: \"EYECULTURE\", \"GCCX\", \"HSVCULTURE\", \"LABHSV\"  No results found for: \"LEGIONELLA\", \"MRSACX\", \"MUMPSCX\", \"MYCOPLASCX\"  No results found for: \"NOCARDIACX\", \"STOOLCX\"  No results found for: \"THROATCX\", \"UNSTIMCULT\", \"URINECX\", \"CULTURE\", \"VZVCULTUR\"  No results found for: \"VIRALCULTU\", \"WOUNDCX\"    Radiology Data:   Imaging Results (Last 24 Hours)       Procedure Component Value Units Date/Time    CT Head " Without Contrast [[id number]] Collected: 12/18/23 1355     Updated: 12/18/23 1401    Narrative:      Exam: CT HEAD WO CONTRAST- 12/18/2023 12:41 PM     HISTORY: Altered mental status     DOSE LENGTH PRODUCT: 804 mGy cm. Automated exposure control was also  utilized to decrease patient radiation dose.     Technique:  Helically acquired CT of the brain without IV contrast was performed.  Sagittal and coronal reformations are also provided for review. Soft  tissue and bone kernels are available for interpretation.     Comparison: 11/5/2023.     Findings:     Ventricles and extra-axial CSF spaces are mildly proportionally  prominent, likely due to atrophy.     No intraparenchymal or extra-axial hemorrhage.     Gray-white matter differentiation is preserved. Moderate periventricular  white matter low-attenuation.     Orbits are grossly unremarkable. Paranasal sinuses are clear. Mastoid  air cells are clear.     No suspicious calvarial or extracranial soft tissue abnormality.     Other:None.       Impression:      Impression:       No acute intracranial abnormality.     Stable atrophy and chronic small vessel ischemic changes.     This report was signed and finalized on 12/18/2023 1:58 PM by Sunny Degroot.       XR Pelvis 1 or 2 View [[id number]] Collected: 12/18/23 1331     Updated: 12/18/23 1335    Narrative:      EXAMINATION: XR PELVIS 1 OR 2 VW-  12/18/2023 1:31 PM     HISTORY: Pelvic pain.     FINDINGS: AP radiograph of the pelvis demonstrates no acute fracture or  dislocation. The femoral heads are concentric and well located within  the acetabulum. The SI joints and pubic symphysis are intact with no  evidence of diastases.       Impression:      1. No acute fracture.     This report was signed and finalized on 12/18/2023 1:32 PM by Dr. Chi Hinds MD.       XR Chest 1 View [[id number]] Collected: 12/18/23 1237     Updated: 12/18/23 1241    Narrative:      XR CHEST 1 VW- 12/18/2023 11:23 AM     HISTORY: ams        COMPARISON: 11/5/2023     FINDINGS:  Upright frontal radiograph of the chest was obtained     Chronic elevation of the right hemidiaphragm. No lung consolidation. No  pleural effusion or pneumothorax. The cardiomediastinal silhouette and  pulmonary vascularity are within normal limits. The osseous structures  and surrounding soft tissues demonstrate no acute abnormality.       Impression:      1.  Stable chest exam without acute process. No acute infiltrate.     This report was signed and finalized on 12/18/2023 12:38 PM by Dr Tenzin Madrigal.               I have reviewed the patient's current medications.     Assessment/Plan   Assessment  Active Hospital Problems    Diagnosis     **Hypokalemia     UTI (urinary tract infection)     Toxic metabolic encephalopathy     Acute renal failure superimposed on stage 3 chronic kidney disease      Ms. Gomes is an 83-year-old female who presented to [facility] on 12/18 for worsening confusion and fall.  She is on Aricept but there is no diagnosis of dementia and patient and her  deny this.  Of note, she was admitted to our hospital in November 2023 for acute urinary tract infection with bacteremia due to Enterococcus.  She was discharged home on Augmentin.  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  Reportedly patient fell so she was brought to the emergency department.  She has had poor oral intake and may have had some diarrhea.  In the ED, urinalysis suggestive of urinary tract infection.  Creatinine elevated at 2.14 consistent with acute kidney injury as well as hypokalemia with potassium 2.2.  CT head showed no acute findings.  Pelvis x-ray showed no acute findings.  Chest x-ray showed no acute findings.  1 L fluid bolus given in ED.    Treatment Plan  Acute urinary tract infection.  Urine culture in November 2023 showed E. coli only resistant to Levaquin.  Follow urine and blood culture.  Continue ceftriaxone.    Hypokalemia, 2.2.  She was given  a total of 60 meq of potassium chloride on 12/18.  Potassium today 1.9 -IV replacement.  Recheck potassium this evening.  Magnesium 1.7 -give 2 g IV magnesium sulfate.    Acute kidney injury with creatinine 2.14.  Creatinine on 11/8/2023 was 1.6.  Gentle IV fluid.  Creatinine today improved at 1.52.  Decrease IV fluid rate.  Encourage p.o. intake.    Reportedly had diarrhea prior to admission.  She recently completed antibiotic treatment with Augmentin.  C. difficile PCR is positive, C. Difficile toxin antigen is negative.  Afebrile and WBC has improved from 22 down to 11.  No abdominal complaints.  Continue oral vancomycin.    Hemoglobin 10.1 on admission.  She has received IV fluid.  No signs of bleeding.  During last hospitalization hemoglobin was down to 6.5.  Patient refused blood transfusion.  Hemoglobin was 7.3 at time of discharge.  Reviewed prior records of colonoscopy and EGD.  She has been found with diverticulosis.  She also had shown ulcer with no stigmata of bleeding on EGD. Patient was started on Retacrit and appointment made for patient to see hematology on 1/10/2024.    SCDs for DVT prophylaxis.    PT/OT.    Medical Decision Making  Number and Complexity of problems: 5 acute problems in the form of acute urinary tract infection, hypokalemia, acute kidney injury, c. Difficile, and nomocytic anemia  Differential Diagnosis: None considered at present    Conditions and Status        Condition is unchanged.     [facility] Data  External documents reviewed: Prior epic records  Cardiac tracing (EKG, telemetry) interpretation: Reviewed  Radiology interpretation: Interpreted by radiology  Labs reviewed: As above  Any tests that were considered but not ordered: None considered at present     Decision rules/scores evaluated (example [id number], Wells, etc): None considered at present     Discussed with: Patient and Dr. Tirado     Care Planning  Shared decision making: Patient is agreeable to ongoing workup and  treatment  Code status and discussions: Full code with full interventions    Disposition  Social Determinants of Health that impact treatment or disposition: none  I expect the patient to be discharged to home versus SNF in 1-2 days.     Electronically signed by MORGAN Ho, 12/19/23, 08:36 CST.

## 2023-12-19 NOTE — PLAN OF CARE
Goal Outcome Evaluation:      Reorient, educate, bed in low position,bed alarm on,safety rounds

## 2023-12-19 NOTE — CASE MANAGEMENT/SOCIAL WORK
BRITTANI has confirmed that the APS report made yesterday meets criteria for an investigation. An APS worker will be assigned to assess.

## 2023-12-19 NOTE — PLAN OF CARE
Goal Outcome Evaluation:  Plan of Care Reviewed With: patient        Progress: no change  Outcome Evaluation: Patient remains confused, Mag and K+ replaced. Antibiotics given as ordered.

## 2023-12-20 LAB
ANION GAP SERPL CALCULATED.3IONS-SCNC: 14 MMOL/L (ref 5–15)
BASOPHILS # BLD AUTO: 0.02 10*3/MM3 (ref 0–0.2)
BASOPHILS NFR BLD AUTO: 0.2 % (ref 0–1.5)
BUN SERPL-MCNC: 29 MG/DL (ref 8–23)
BUN/CREAT SERPL: 27.4 (ref 7–25)
CALCIUM SPEC-SCNC: 9 MG/DL (ref 8.6–10.5)
CHLORIDE SERPL-SCNC: 113 MMOL/L (ref 98–107)
CO2 SERPL-SCNC: 16 MMOL/L (ref 22–29)
CREAT SERPL-MCNC: 1.06 MG/DL (ref 0.57–1)
DEPRECATED RDW RBC AUTO: 55.8 FL (ref 37–54)
EGFRCR SERPLBLD CKD-EPI 2021: 52.9 ML/MIN/1.73
EOSINOPHIL # BLD AUTO: 0.14 10*3/MM3 (ref 0–0.4)
EOSINOPHIL NFR BLD AUTO: 1.7 % (ref 0.3–6.2)
ERYTHROCYTE [DISTWIDTH] IN BLOOD BY AUTOMATED COUNT: 16.4 % (ref 12.3–15.4)
GLUCOSE SERPL-MCNC: 100 MG/DL (ref 65–99)
HCT VFR BLD AUTO: 27.1 % (ref 34–46.6)
HGB BLD-MCNC: 8.3 G/DL (ref 12–15.9)
IMM GRANULOCYTES # BLD AUTO: 0.04 10*3/MM3 (ref 0–0.05)
IMM GRANULOCYTES NFR BLD AUTO: 0.5 % (ref 0–0.5)
INR PPP: 1.47 (ref 0.91–1.09)
LYMPHOCYTES # BLD AUTO: 1.3 10*3/MM3 (ref 0.7–3.1)
LYMPHOCYTES NFR BLD AUTO: 16.1 % (ref 19.6–45.3)
MCH RBC QN AUTO: 28.3 PG (ref 26.6–33)
MCHC RBC AUTO-ENTMCNC: 30.6 G/DL (ref 31.5–35.7)
MCV RBC AUTO: 92.5 FL (ref 79–97)
MONOCYTES # BLD AUTO: 0.55 10*3/MM3 (ref 0.1–0.9)
MONOCYTES NFR BLD AUTO: 6.8 % (ref 5–12)
NEUTROPHILS NFR BLD AUTO: 6.02 10*3/MM3 (ref 1.7–7)
NEUTROPHILS NFR BLD AUTO: 74.7 % (ref 42.7–76)
NRBC BLD AUTO-RTO: 0 /100 WBC (ref 0–0.2)
PLATELET # BLD AUTO: 179 10*3/MM3 (ref 140–450)
PMV BLD AUTO: 11.1 FL (ref 6–12)
POTASSIUM SERPL-SCNC: 2.9 MMOL/L (ref 3.5–5.2)
POTASSIUM SERPL-SCNC: 3.2 MMOL/L (ref 3.5–5.2)
PROTHROMBIN TIME: 18 SECONDS (ref 11.8–14.8)
RBC # BLD AUTO: 2.93 10*6/MM3 (ref 3.77–5.28)
SODIUM SERPL-SCNC: 143 MMOL/L (ref 136–145)
WBC NRBC COR # BLD AUTO: 8.07 10*3/MM3 (ref 3.4–10.8)

## 2023-12-20 PROCEDURE — 97161 PT EVAL LOW COMPLEX 20 MIN: CPT

## 2023-12-20 PROCEDURE — 97165 OT EVAL LOW COMPLEX 30 MIN: CPT | Performed by: OCCUPATIONAL THERAPIST

## 2023-12-20 PROCEDURE — 85610 PROTHROMBIN TIME: CPT | Performed by: NURSE PRACTITIONER

## 2023-12-20 PROCEDURE — 85025 COMPLETE CBC W/AUTO DIFF WBC: CPT | Performed by: INTERNAL MEDICINE

## 2023-12-20 PROCEDURE — 80048 BASIC METABOLIC PNL TOTAL CA: CPT | Performed by: INTERNAL MEDICINE

## 2023-12-20 PROCEDURE — 84132 ASSAY OF SERUM POTASSIUM: CPT | Performed by: NURSE PRACTITIONER

## 2023-12-20 RX ORDER — CEFDINIR 300 MG/1
300 CAPSULE ORAL EVERY 12 HOURS SCHEDULED
Status: COMPLETED | OUTPATIENT
Start: 2023-12-20 | End: 2023-12-20

## 2023-12-20 RX ORDER — POTASSIUM CHLORIDE 750 MG/1
40 CAPSULE, EXTENDED RELEASE ORAL ONCE
Status: COMPLETED | OUTPATIENT
Start: 2023-12-20 | End: 2023-12-20

## 2023-12-20 RX ORDER — POTASSIUM CHLORIDE 750 MG/1
40 CAPSULE, EXTENDED RELEASE ORAL EVERY 4 HOURS
Status: COMPLETED | OUTPATIENT
Start: 2023-12-20 | End: 2023-12-21

## 2023-12-20 RX ADMIN — DONEPEZIL HYDROCHLORIDE 10 MG: 10 TABLET, FILM COATED ORAL at 20:11

## 2023-12-20 RX ADMIN — ACETAMINOPHEN 650 MG: 325 TABLET, FILM COATED ORAL at 15:35

## 2023-12-20 RX ADMIN — POTASSIUM CHLORIDE 40 MEQ: 10 CAPSULE, COATED, EXTENDED RELEASE ORAL at 22:42

## 2023-12-20 RX ADMIN — CYCLOBENZAPRINE 10 MG: 10 TABLET, FILM COATED ORAL at 20:11

## 2023-12-20 RX ADMIN — VANCOMYCIN HYDROCHLORIDE 125 MG: 125 CAPSULE ORAL at 14:42

## 2023-12-20 RX ADMIN — ATORVASTATIN CALCIUM 40 MG: 40 TABLET ORAL at 09:29

## 2023-12-20 RX ADMIN — FAMOTIDINE 20 MG: 20 TABLET, FILM COATED ORAL at 09:29

## 2023-12-20 RX ADMIN — CEFDINIR 300 MG: 300 CAPSULE ORAL at 12:01

## 2023-12-20 RX ADMIN — POTASSIUM CHLORIDE 40 MEQ: 750 CAPSULE, EXTENDED RELEASE ORAL at 09:29

## 2023-12-20 RX ADMIN — VANCOMYCIN HYDROCHLORIDE 125 MG: 125 CAPSULE ORAL at 20:11

## 2023-12-20 RX ADMIN — VANCOMYCIN HYDROCHLORIDE 125 MG: 125 CAPSULE ORAL at 03:22

## 2023-12-20 RX ADMIN — POTASSIUM CHLORIDE 40 MEQ: 750 CAPSULE, EXTENDED RELEASE ORAL at 03:21

## 2023-12-20 RX ADMIN — CEFDINIR 300 MG: 300 CAPSULE ORAL at 20:11

## 2023-12-20 RX ADMIN — VANCOMYCIN HYDROCHLORIDE 125 MG: 125 CAPSULE ORAL at 09:29

## 2023-12-20 RX ADMIN — CYCLOBENZAPRINE 10 MG: 10 TABLET, FILM COATED ORAL at 09:29

## 2023-12-20 RX ADMIN — POTASSIUM CHLORIDE 40 MEQ: 10 CAPSULE, COATED, EXTENDED RELEASE ORAL at 12:01

## 2023-12-20 NOTE — PLAN OF CARE
Goal Outcome Evaluation:  Plan of Care Reviewed With: patient        Progress: no change  Outcome Evaluation: OT evaluation completed. Pt is alert and oriented x4, but significant conversational confusion. Unsure of accuracy of history and living environment. Pt reports headache, ornelas baker 2. Pt is easily distracted but easy to redirect. Pt was SBA for bed mobility. Pt was min A for sit to stand t/f. Pt completed 4 transfers before being able to maintain upright standing. Pt was CGA-min A for side steps at EOB with RW but then abruptly sat down d/t endurance. Pt was min A for LB dressing. Skilled OT recommended to address adls, functional mobility and endurance. Recommended d/c SNF.      Anticipated Discharge Disposition (OT): skilled nursing facility

## 2023-12-20 NOTE — THERAPY EVALUATION
Patient Name: Jennifer Gomes  : 1940    MRN: [id number]                              Today's Date: 2023       Admit Date: 2023    Visit Dx:     ICD-10-CM ICD-9-CM   1. Acute renal failure, unspecified acute renal failure type  N17.9 584.9   2. Hypokalemia  E87.6 276.8   3. Urinary tract infection without hematuria, site unspecified  N39.0 599.0   4. Altered mental status, unspecified altered mental status type  R41.82 780.97   5. Impaired mobility [Z74.09]  Z74.09 799.89   6. Impaired mobility and ADLs [Z74.09, Z78.9]  Z74.09 V49.89    Z78.9      Patient Active Problem List   Diagnosis    Gastroesophageal reflux disease    Spinal stenosis, lumbar region, without neurogenic claudication    Overweight    Non-smoker    Erosion of vaginal mesh    Adenocarcinoma of endometrium    Encounter for consultation    S/P hysterectomy with oophorectomy    Encounter for follow-up surveillance of endometrial cancer    History of radiation therapy    Obesity (BMI 30-39.9)    Non-traumatic rhabdomyolysis    Metabolic encephalopathy    Acute renal failure superimposed on stage 3 chronic kidney disease    Acute respiratory failure with hypoxia and hypercapnia    Medical non-compliance, does not take narcotics as prescribed    SIRS (systemic inflammatory response syndrome)    Chronic constipation    Chronic pain syndrome    Chronic prescription opiate use    Anemia    Hypothyroidism (acquired)    Essential hypertension    TOMÁS (acute kidney injury)    Venous insufficiency of both lower extremities    Fluid retention    Low blood pressure reading    Obesity, Class III, BMI 40-49.9 (morbid obesity)    Chronic intractable headache    RAFAL (obstructive sleep apnea)    Change in bowel habits    Acute encephalopathy due to UTI    Toxic metabolic encephalopathy    Polypharmacy    Frequent falls    Grade III internal hemorrhoids    External hemorrhoids    E. coli UTI    Colitis    Moderate malnutrition    Transaminitis     Acute cholecystitis    Acute UTI (urinary tract infection)    Polypharmacy    Altered mental status    UTI (urinary tract infection)    Bacteremia due to Enterococcus    Dementia    Hypokalemia     Past Medical History:   Diagnosis Date    Anxiety     Arthritis     Bronchitis     Cancer     uterine    Chronic pain     Depression     Disease of thyroid gland     Fibromyalgia     GERD (gastroesophageal reflux disease)     Headache     History of transfusion     AS     Hyperlipidemia     Hypertension     Incontinence     Insomnia     Leg pain     Lumbar stenosis     Migraines     Peptic ulcer     Restless legs     Sleep apnea     NO C-PAP    UTI (urinary tract infection)     Vaginal bleeding      Past Surgical History:   Procedure Laterality Date    BLADDER REPAIR      MESH HAD TO BE REMOVED IN 2013    BREAST BIOPSY Right 2017    benign    BREAST CYST EXCISION Left     CARDIAC CATHETERIZATION      CARPAL TUNNEL RELEASE      CATARACT EXTRACTION W/ INTRAOCULAR LENS  IMPLANT, BILATERAL      CHOLECYSTECTOMY WITH INTRAOPERATIVE CHOLANGIOGRAM N/A 2023    Procedure: CHOLECYSTECTOMY LAPAROSCOPIC INTRAOPERATIVE CHOLANGIOGRAM;  Surgeon: Gerardo Arciniega MD;  Location: [facility] OR;  Service: General;  Laterality: N/A;    COLONOSCOPY      COLONOSCOPY N/A 10/01/2021    Procedure: COLONOSCOPY WITH ANESTHESIA;  Surgeon: Tom Velasco DO;  Location: [facility] ENDOSCOPY;  Service: Gastroenterology;  Laterality: N/A;  pre: change in bowel habits  post: diverticulosis. hemorrhoids.   Olivia Mora APRN        CYSTECTOMY      D & C HYSTEROSCOPY N/A 2017    Procedure: DILATATION AND CURETTAGE HYSTEROSCOPY;  Surgeon: Shasta Madrigal MD;  Location: [facility] OR;  Service:     DILATION AND CURETTAGE, DIAGNOSTIC / THERAPEUTIC      ENDOSCOPY  2010    Short segment of Arriola's,Moderate chroninc esophagogastritis and negative H.pylori    ENDOSCOPY N/A 2017    Procedure: ESOPHAGOGASTRODUODENOSCOPY WITH  ANESTHESIA;  Surgeon: Tom Velasco DO;  Location: [facility] ENDOSCOPY;  Service:     EYE SURGERY      RETINA    HEMORRHOIDECTOMY SIGMOIDOSCOPY N/A 3/21/2023    Procedure: HEMORRHOIDECTOMY WITH EXAM UNDER ANESTHESIA;  Surgeon: Holly Chavez MD;  Location: [facility] OR;  Service: General;  Laterality: N/A;    HYSTERECTOMY  12/20/2017    ORIF TIBIA/FIBULA FRACTURES Left 2000    TRANSVAGINAL TAPING SUSPENSION N/A 11/06/2017    Procedure: VAGINAL MESH REVISION;  Surgeon: Shasta Madrigal MD;  Location: [facility] OR;  Service:     VAGINAL MESH REVISION  2013      General Information       Row Name 12/20/23 0925          OT Time and Intention    Document Type evaluation  Dx: Acute renal failure, hypokalemia, UTI, AMS, metabolic encephalopathy.  -MM     Mode of Treatment occupational therapy  -MM       Row Name 12/20/23 0925          General Information    Patient Profile Reviewed yes  -MM     Prior Level of Function independent:;all household mobility;ADL's  per pt report  -MM     Existing Precautions/Restrictions fall  -MM     Barriers to Rehab cognitive status;medically complex  -MM       Row Name 12/20/23 0925          Living Environment    People in Home spouse  walk in shower with shower chair and grab bars  -MM       Row Name 12/20/23 0925          Home Main Entrance    Number of Stairs, Main Entrance six  -MM     Stair Railings, Main Entrance railing on right side (ascending)  -MM       Row Name 12/20/23 0925          Cognition    Orientation Status (Cognition) oriented x 4;other (see comments)  with significant conversational confusion  -MM       Row Name 12/20/23 0925          Safety Issues, Functional Mobility    Safety Issues Affecting Function (Mobility) ability to follow commands;awareness of need for assistance;impulsivity;at risk behavior observed;insight into deficits/self-awareness;judgment;positioning of assistive device;problem-solving;safety precaution awareness;safety precautions  follow-through/compliance  -MM     Impairments Affecting Function (Mobility) balance;cognition;endurance/activity tolerance;postural/trunk control;strength  -MM     Cognitive Impairments, Mobility Safety/Performance attention;awareness, need for assistance;impulsivity;insight into deficits/self-awareness;judgment;problem-solving/reasoning;safety precaution awareness;safety precaution follow-through  -MM               User Key  (r) = Recorded By, (t) = Taken By, (c) = Cosigned By      Initials Name Provider Type    MM Dontae Metzger, OTR/L Occupational Therapist                     Mobility/ADL's       Row Name 12/20/23 0925          Bed Mobility    Bed Mobility supine-sit;sit-supine  -MM     Supine-Sit [place] (Bed Mobility) standby assist  -MM     Sit-Supine [place] (Bed Mobility) standby assist  -MM     Assistive Device (Bed Mobility) head of bed elevated  -MM       Row Name 12/20/23 0925          Transfers    Transfers sit-stand transfer;stand-sit transfer  -MM     Comment, (Transfers) completed 4 sit to stand t/fs before being able to maintain upright standing  -MM       Row Name 12/20/23 0925          Sit-Stand Transfer    Sit-Stand [place] (Transfers) minimum assist (75% patient effort);verbal cues  -MM       Row Name 12/20/23 0925          Stand-Sit Transfer    Stand-Sit [place] (Transfers) minimum assist (75% patient effort);verbal cues  -MM       Row Name 12/20/23 0925          Functional Mobility    Functional Mobility- Ind. Level contact guard assist;minimum assist (75% patient effort);verbal cues required  -MM     Functional Mobility- Device walker, front-wheeled  -MM     Functional Mobility- Comment 3 side steps before sitting down d/t decreased endurance  -MM       Row Name 12/20/23 0925          Activities of Daily Living    BADL Assessment/Intervention lower body dressing  -MM       Row Name 12/20/23 0925          Lower Body Dressing Assessment/Training    [place] Level  (Lower Body Dressing) don;socks;minimum assist (75% patient effort)  -MM     Position (Lower Body Dressing) edge of bed sitting  -MM               User Key  (r) = Recorded By, (t) = Taken By, (c) = Cosigned By      Initials Name Provider Type    Dontae oCrley, OTR/L Occupational Therapist                   Obj/Interventions       Row Name 12/20/23 0925          Range of Motion Comprehensive    General Range of Motion bilateral upper extremity ROM WNL  -MM       Row Name 12/20/23 0925          Strength Comprehensive (MMT)    Comment, General Manual Muscle Testing (MMT) Assessment BUE 4-/5  -MM       Row Name 12/20/23 0925          Balance    Balance Assessment sitting static balance;sitting dynamic balance;standing static balance;standing dynamic balance  -MM     Static Sitting Balance supervision;verbal cues  -MM     Dynamic Sitting Balance supervision;contact guard;verbal cues  -MM     Position, Sitting Balance supported;unsupported;sitting edge of bed  -MM     Static Standing Balance contact guard;minimal assist;verbal cues  -MM     Dynamic Standing Balance minimal assist;verbal cues  -MM     Position/Device Used, Standing Balance supported;walker, front-wheeled  -MM               User Key  (r) = Recorded By, (t) = Taken By, (c) = Cosigned By      Initials Name Provider Type    Dontae Corley, OTR/L Occupational Therapist                   Goals/Plan       Row Name 12/20/23 0925          Dressing Goal 1 (OT)    Activity/Device (Dressing Goal 1, OT) dressing skills, all  -MM     [place]/Cues Needed (Dressing Goal 1, OT) supervision required;set-up required  -MM     Time Frame (Dressing Goal 1, OT) long term goal (LTG);by discharge  -MM     Progress/Outcome (Dressing Goal 1, OT) new goal  -MM       Row Name 12/20/23 0925          Toileting Goal 1 (OT)    Activity/Device (Toileting Goal 1, OT) toileting skills, all  -MM     [place] Level/Cues Needed (Toileting Goal 1, OT) supervision  required;set-up required  -MM     Time Frame (Toileting Goal 1, OT) long term goal (LTG);by discharge  -MM     Progress/Outcome (Toileting Goal 1, OT) new goal  -MM       Row Name 12/20/23 0925          Grooming Goal 1 (OT)    Activity/Device (Grooming Goal 1, OT) grooming skills, all  -MM     [place] (Grooming Goal 1, OT) modified independence;set-up required  -MM     Time Frame (Grooming Goal 1, OT) long term goal (LTG);by discharge  -MM     Progress/Outcome (Grooming Goal 1, OT) new goal  -MM       Row Name 12/20/23 0925          Therapy Assessment/Plan (OT)    Planned Therapy Interventions (OT) activity tolerance training;adaptive equipment training;BADL retraining;cognitive/visual perception retraining;functional balance retraining;neuromuscular control/coordination retraining;occupation/activity based interventions;passive ROM/stretching;patient/caregiver education/training;ROM/therapeutic exercise;strengthening exercise;transfer/mobility retraining  -MM               User Key  (r) = Recorded By, (t) = Taken By, (c) = Cosigned By      Initials Name Provider Type    MM Dontae Metzger, OTR/L Occupational Therapist                   Clinical Impression       Row Name 12/20/23 0925          Pain Assessment    Additional Documentation Pain Scale: FACES Pre/Post-Treatment (Group)  -MM       Row Name 12/20/23 0925          Pain Scale: FACES Pre/Post-Treatment    Pain: FACES Scale, Pretreatment 2-->hurts little bit  -MM     Posttreatment Pain Rating 2-->hurts little bit  -MM     Pain Location - head  -MM       Row Name 12/20/23 0925          Plan of Care Review    Plan of Care Reviewed With patient  -MM     Progress no change  -MM     Outcome Evaluation OT evaluation completed. Pt is alert and oriented x4, but significant conversational confusion. Unsure of accuracy of history and living environment. Pt reports headache, ornelas baker 2. Pt is easily distracted but easy to redirect. Pt was SBA for bed mobility.  Pt was min A for sit to stand t/f. Pt completed 4 transfers before being able to maintain upright standing. Pt was CGA-min A for side steps at EOB with RW but then abruptly sat down d/t endurance. Pt was min A for LB dressing. Skilled OT recommended to address adls, functional mobility and endurance. Recommended d/c SNF.  -MM       Row Name 12/20/23 0925          Therapy Assessment/Plan (OT)    Patient/Family Therapy Goal Statement (OT) go home  -MM     Rehab Potential (OT) good, to achieve stated therapy goals  -MM     Criteria for Skilled Therapeutic Interventions Met (OT) yes;meets criteria;skilled treatment is necessary  -MM     Therapy Frequency (OT) 5 times/wk  -MM     Predicted Duration of Therapy Intervention (OT) until d/c  -MM       Row Name 12/20/23 0925          Therapy Plan Review/Discharge Plan (OT)    Anticipated Discharge Disposition (OT) skilled nursing facility  -MM       Row Name 12/20/23 0925          Positioning and Restraints    Pre-Treatment Position in bed  -MM     Post Treatment Position bed  -MM     In Bed notified nsg;fowlers;call light within reach;encouraged to call for assist;exit alarm on;side rails up x2  -MM               User Key  (r) = Recorded By, (t) = Taken By, (c) = Cosigned By      Initials Name Provider Type    MM Dontae Metzger, OTR/L Occupational Therapist                   Outcome Measures       Row Name 12/20/23 0925          How much help from another is currently needed...    Putting on and taking off regular lower body clothing? 3  -MM     Bathing (including washing, rinsing, and drying) 2  -MM     Toileting (which includes using toilet bed pan or urinal) 2  -MM     Putting on and taking off regular upper body clothing 3  -MM     Taking care of personal grooming (such as brushing teeth) 3  -MM     Eating meals 3  -MM     AM-PAC 6 Clicks Score (OT) 16  -MM       Row Name 12/20/23 0920          How much help from another person do you currently need...    Turning from  your back to your side while in flat bed without using bedrails? 3  -LH     Moving from lying on back to sitting on the side of a flat bed without bedrails? 3  -LH     Moving to and from a bed to a chair (including a wheelchair)? 2  -LH     Standing up from a chair using your arms (e.g., wheelchair, bedside chair)? 3  -LH     Climbing 3-5 steps with a railing? 1  -LH     To walk in hospital room? 2  -     AM-PAC 6 Clicks Score (PT) 14  -     Highest Level of Mobility Goal 4 --> Transfer to chair/commode  -       Row Name 12/20/23 0925 12/20/23 0920       Functional Assessment    Outcome Measure Options AM-PAC 6 Clicks Daily Activity (OT)  -MM AM-PAC 6 Clicks Basic Mobility (PT)  -              User Key  (r) = Recorded By, (t) = Taken By, (c) = Cosigned By      Initials Name Provider Type     Terrell Larkin, PT Physical Therapist    MM Dontae Metzger, OTR/L Occupational Therapist                    Occupational Therapy Education       Title: PT OT SLP Therapies (In Progress)       Topic: Occupational Therapy (In Progress)       Point: ADL training (In Progress)       Description:   Instruct learner(s) on proper safety adaptation and remediation techniques during self care or transfers.   Instruct in proper use of assistive devices.                  Learning Progress Summary             Patient Acceptance, E, NR by MM at 12/20/2023 1071                         Point: Home exercise program (Not Started)       Description:   Instruct learner(s) on appropriate technique for monitoring, assisting and/or progressing therapeutic exercises/activities.                  Learner Progress:  Not documented in this visit.              Point: Precautions (In Progress)       Description:   Instruct learner(s) on prescribed precautions during self-care and functional transfers.                  Learning Progress Summary             Patient Acceptance, E, NR by MM at 12/20/2023 2249                         Point: Body  mechanics (In Progress)       Description:   Instruct learner(s) on proper positioning and spine alignment during self-care, functional mobility activities and/or exercises.                  Learning Progress Summary             Patient Acceptance, E, NR by  at 12/20/2023 1549                                         User Key       Initials Effective Dates Name Provider Type Discipline     07/11/23 -  Dontae Metzger, OTR/L Occupational Therapist OT                  OT Recommendation and Plan  Planned Therapy Interventions (OT): activity tolerance training, adaptive equipment training, BADL retraining, cognitive/visual perception retraining, functional balance retraining, neuromuscular control/coordination retraining, occupation/activity based interventions, passive ROM/stretching, patient/caregiver education/training, ROM/therapeutic exercise, strengthening exercise, transfer/mobility retraining  Therapy Frequency (OT): 5 times/wk  Plan of Care Review  Plan of Care Reviewed With: patient  Progress: no change  Outcome Evaluation: OT evaluation completed. Pt is alert and oriented x4, but significant conversational confusion. Unsure of accuracy of history and living environment. Pt reports headache, ornelas baker 2. Pt is easily distracted but easy to redirect. Pt was SBA for bed mobility. Pt was min A for sit to stand t/f. Pt completed 4 transfers before being able to maintain upright standing. Pt was CGA-min A for side steps at EOB with RW but then abruptly sat down d/t endurance. Pt was min A for LB dressing. Skilled OT recommended to address adls, functional mobility and endurance. Recommended d/c SNF.     Time Calculation:         Time Calculation- OT       Row Name 12/20/23 0925             Time Calculation- OT    OT Start Time 0925  +8 minutes chart review  -      OT Stop Time 0957  -MM      OT Time Calculation (min) 32 min  -MM      OT Received On 12/20/23  -      OT Goal Re-Cert Due Date 12/30/23  -MM                 User Key  (r) = Recorded By, (t) = Taken By, (c) = Cosigned By      Initials Name Provider Type     Dontae Metzger, OTR/L Occupational Therapist                  Therapy Charges for Today       Code Description Service Date Service Provider Modifiers Qty    [id number]  OT EVAL LOW COMPLEXITY 3 12/20/2023 Dontae Metzger OTR/L GO 1                 ZEESHAN Ross/IVANA  12/20/2023

## 2023-12-20 NOTE — PLAN OF CARE
Problem: Skin Injury Risk Increased  Goal: Skin Health and Integrity  Outcome: Ongoing, Progressing  Intervention: Optimize Skin Protection  Recent Flowsheet Documentation  Taken 12/20/2023 0100 by Alejandro Mukherjee RN  Head of Bed (HOB) Positioning: HOB elevated  Taken 12/20/2023 0000 by Alejandro Mukherjee RN  Head of Bed (HOB) Positioning: HOB elevated  Taken 12/19/2023 2300 by Alejandro Mukherjee RN  Head of Bed (HOB) Positioning: HOB elevated  Taken 12/19/2023 2130 by Alejandro Mukherjee RN  Head of Bed (HOB) Positioning: HOB at 30-45 degrees     Problem: Fall Injury Risk  Goal: Absence of Fall and Fall-Related Injury  Outcome: Ongoing, Progressing  Intervention: Promote Injury-Free Environment  Recent Flowsheet Documentation  Taken 12/20/2023 0100 by Alejandro Mukherjee RN  Safety Promotion/Fall Prevention: safety round/check completed  Taken 12/20/2023 0000 by Alejandro Mukherjee RN  Safety Promotion/Fall Prevention: safety round/check completed  Taken 12/19/2023 2300 by Alejandro Mukherjee RN  Safety Promotion/Fall Prevention: safety round/check completed  Taken 12/19/2023 2130 by Alejandro Mukherjee RN  Safety Promotion/Fall Prevention: safety round/check completed     Problem: Adjustment to Illness (Sepsis/Septic Shock)  Goal: Optimal Coping  Outcome: Ongoing, Progressing     Problem: Bleeding (Sepsis/Septic Shock)  Goal: Absence of Bleeding  Outcome: Ongoing, Progressing     Problem: Glycemic Control Impaired (Sepsis/Septic Shock)  Goal: Blood Glucose Level Within Desired Range  Outcome: Ongoing, Progressing     Problem: Infection Progression (Sepsis/Septic Shock)  Goal: Absence of Infection Signs and Symptoms  Outcome: Ongoing, Progressing  Intervention: Promote Recovery  Recent Flowsheet Documentation  Taken 12/19/2023 2130 by Alejandro Mukherjee RN  Activity Management: bedrest     Problem: Nutrition Impaired (Sepsis/Septic Shock)  Goal: Optimal Nutrition Intake  Outcome: Ongoing, Progressing     Problem: Adult Inpatient Plan of Care  Goal: Plan of Care  Review  Outcome: Ongoing, Progressing  Flowsheets (Taken 12/20/2023 0650)  Progress: no change  Plan of Care Reviewed With: patient  Outcome Evaluation: Patient confused, illogical, hallucinating. Verbally and physically aggressive with staff at times but can typically be redirected. Patient removed her IV and was uncooperative with replacement. Potassium replaced per protocol. Numerous bed exit attempts but patient to weak to stand, bed alarm on and audible.  Goal: Patient-Specific Goal (Individualized)  Outcome: Ongoing, Progressing  Goal: Absence of Hospital-Acquired Illness or Injury  Outcome: Ongoing, Progressing  Intervention: Identify and Manage Fall Risk  Recent Flowsheet Documentation  Taken 12/20/2023 0100 by Alejandro Mukherjee RN  Safety Promotion/Fall Prevention: safety round/check completed  Taken 12/20/2023 0000 by Alejandro Mukherjee RN  Safety Promotion/Fall Prevention: safety round/check completed  Taken 12/19/2023 2300 by Alejandro Mukherjee RN  Safety Promotion/Fall Prevention: safety round/check completed  Taken 12/19/2023 2130 by Alejandro Mukherjee RN  Safety Promotion/Fall Prevention: safety round/check completed  Intervention: Prevent Skin Injury  Recent Flowsheet Documentation  Taken 12/20/2023 0100 by Alejandro Mukherjee RN  Body Position:   right   turned   position changed independently  Taken 12/20/2023 0000 by Alejandro Mukherjee RN  Body Position:   left   position changed independently  Taken 12/19/2023 2300 by Alejandro Mukherjee RN  Body Position:   left   turned   position changed independently  Taken 12/19/2023 2130 by Alejandro Mukherjee RN  Body Position:   turned   right   position changed independently  Intervention: Prevent and Manage VTE (Venous Thromboembolism) Risk  Recent Flowsheet Documentation  Taken 12/19/2023 2130 by Aljeandro Mukherjee RN  Activity Management: bedrest  Goal: Optimal Comfort and Wellbeing  Outcome: Ongoing, Progressing  Intervention: Provide Person-Centered Care  Recent Flowsheet Documentation  Taken 12/19/2023  2130 by Alejandro Mukherjee, RN  Trust Relationship/Rapport:   care explained   choices provided  Goal: Readiness for Transition of Care  Outcome: Ongoing, Progressing   Goal Outcome Evaluation:  Plan of Care Reviewed With: patient        Progress: no change  Outcome Evaluation: Patient confused, illogical, hallucinating. Verbally and physically aggressive with staff at times but can typically be redirected. Patient removed her IV and was uncooperative with replacement. Potassium replaced per protocol. Numerous bed exit attempts but patient to weak to stand, bed alarm on and audible.

## 2023-12-20 NOTE — THERAPY EVALUATION
Patient Name: Jennifer Gomes  : 1940    MRN: [id number]                              Today's Date: 2023       Admit Date: 2023    Visit Dx:     ICD-10-CM ICD-9-CM   1. Acute renal failure, unspecified acute renal failure type  N17.9 584.9   2. Hypokalemia  E87.6 276.8   3. Urinary tract infection without hematuria, site unspecified  N39.0 599.0   4. Altered mental status, unspecified altered mental status type  R41.82 780.97   5. Impaired mobility [Z74.09]  Z74.09 799.89     Patient Active Problem List   Diagnosis    Gastroesophageal reflux disease    Spinal stenosis, lumbar region, without neurogenic claudication    Overweight    Non-smoker    Erosion of vaginal mesh    Adenocarcinoma of endometrium    Encounter for consultation    S/P hysterectomy with oophorectomy    Encounter for follow-up surveillance of endometrial cancer    History of radiation therapy    Obesity (BMI 30-39.9)    Non-traumatic rhabdomyolysis    Metabolic encephalopathy    Acute renal failure superimposed on stage 3 chronic kidney disease    Acute respiratory failure with hypoxia and hypercapnia    Medical non-compliance, does not take narcotics as prescribed    SIRS (systemic inflammatory response syndrome)    Chronic constipation    Chronic pain syndrome    Chronic prescription opiate use    Anemia    Hypothyroidism (acquired)    Essential hypertension    TOMÁS (acute kidney injury)    Venous insufficiency of both lower extremities    Fluid retention    Low blood pressure reading    Obesity, Class III, BMI 40-49.9 (morbid obesity)    Chronic intractable headache    RAFAL (obstructive sleep apnea)    Change in bowel habits    Acute encephalopathy due to UTI    Toxic metabolic encephalopathy    Polypharmacy    Frequent falls    Grade III internal hemorrhoids    External hemorrhoids    E. coli UTI    Colitis    Moderate malnutrition    Transaminitis    Acute cholecystitis    Acute UTI (urinary tract infection)    Polypharmacy     Altered mental status    UTI (urinary tract infection)    Bacteremia due to Enterococcus    Dementia    Hypokalemia     Past Medical History:   Diagnosis Date    Anxiety     Arthritis     Bronchitis     Cancer     uterine    Chronic pain     Depression     Disease of thyroid gland     Fibromyalgia     GERD (gastroesophageal reflux disease)     Headache     History of transfusion     AS     Hyperlipidemia     Hypertension     Incontinence     Insomnia     Leg pain     Lumbar stenosis     Migraines     Peptic ulcer     Restless legs     Sleep apnea     NO C-PAP    UTI (urinary tract infection)     Vaginal bleeding      Past Surgical History:   Procedure Laterality Date    BLADDER REPAIR      MESH HAD TO BE REMOVED IN 2013    BREAST BIOPSY Right 2017    benign    BREAST CYST EXCISION Left 1982    CARDIAC CATHETERIZATION      CARPAL TUNNEL RELEASE      CATARACT EXTRACTION W/ INTRAOCULAR LENS  IMPLANT, BILATERAL      CHOLECYSTECTOMY WITH INTRAOPERATIVE CHOLANGIOGRAM N/A 2023    Procedure: CHOLECYSTECTOMY LAPAROSCOPIC INTRAOPERATIVE CHOLANGIOGRAM;  Surgeon: Gerardo Arciniega MD;  Location: [facility] OR;  Service: General;  Laterality: N/A;    COLONOSCOPY      COLONOSCOPY N/A 10/01/2021    Procedure: COLONOSCOPY WITH ANESTHESIA;  Surgeon: Tom Velasco DO;  Location: [facility] ENDOSCOPY;  Service: Gastroenterology;  Laterality: N/A;  pre: change in bowel habits  post: diverticulosis. hemorrhoids.   Olivia Mora APRN        CYSTECTOMY      D & C HYSTEROSCOPY N/A 2017    Procedure: DILATATION AND CURETTAGE HYSTEROSCOPY;  Surgeon: Shasta Madrigal MD;  Location: [facility] OR;  Service:     DILATION AND CURETTAGE, DIAGNOSTIC / THERAPEUTIC      ENDOSCOPY  2010    Short segment of Arriola's,Moderate chroninc esophagogastritis and negative H.pylori    ENDOSCOPY N/A 2017    Procedure: ESOPHAGOGASTRODUODENOSCOPY WITH ANESTHESIA;  Surgeon: Tom Velasco DO;  Location: [facility] ENDOSCOPY;   Service:     EYE SURGERY      RETINA    HEMORRHOIDECTOMY SIGMOIDOSCOPY N/A 3/21/2023    Procedure: HEMORRHOIDECTOMY WITH EXAM UNDER ANESTHESIA;  Surgeon: Holly Chavez MD;  Location:  [facility] OR;  Service: General;  Laterality: N/A;    HYSTERECTOMY  12/20/2017    ORIF TIBIA/FIBULA FRACTURES Left 2000    TRANSVAGINAL TAPING SUSPENSION N/A 11/06/2017    Procedure: VAGINAL MESH REVISION;  Surgeon: Shasta Madrigal MD;  Location:  [facility] OR;  Service:     VAGINAL MESH REVISION  2013      General Information       Row Name 12/20/23 0920          Physical Therapy Time and Intention    Document Type evaluation  -     Mode of Treatment co-treatment;physical therapy  -       Row Name 12/20/23 0920          General Information    Patient Profile Reviewed yes  -     Prior Level of Function independent:;all household mobility;ADL's  unsure of accuracy, pt has conversational confusion  -     Existing Precautions/Restrictions fall  H&H 8.3/27.1  -     Barriers to Rehab cognitive status  -       Row Name 12/20/23 0920          Living Environment    People in Home spouse  -       Row Name 12/20/23 0920          Home Main Entrance    Number of Stairs, Main Entrance six  -     Stair Railings, Main Entrance railing on right side (ascending)  -       Row Name 12/20/23 0920          Cognition    Orientation Status (Cognition) oriented x 4;other (see comments)  but demo conversational confusion  -       Row Name 12/20/23 0920          Safety Issues, Functional Mobility    Safety Issues Affecting Function (Mobility) ability to follow commands  -     Impairments Affecting Function (Mobility) balance;cognition;endurance/activity tolerance;postural/trunk control;strength  -     Cognitive Impairments, Mobility Safety/Performance impulsivity;insight into deficits/self-awareness;judgment;problem-solving/reasoning;safety precaution awareness;safety precaution follow-through  -               User Key  (r) = Recorded  "By, (t) = Taken By, (c) = Cosigned By      Initials Name Provider Type     Terrell Larkin, PT Physical Therapist                   Mobility       Row Name 12/20/23 0920          Bed Mobility    Bed Mobility supine-sit;sit-supine  -     Supine-Sit [place] (Bed Mobility) standby assist  -     Sit-Supine [place] (Bed Mobility) standby assist  -     Assistive Device (Bed Mobility) head of bed elevated  -       Row Name 12/20/23 0920          Sit-Stand Transfer    Sit-Stand [place] (Transfers) minimum assist (75% patient effort)  -     Comment, (Sit-Stand Transfer) x4  -[facility] Name 12/20/23 0920          Gait/Stairs (Locomotion)    Assistive Device (Gait) walker, front-wheeled  -     Distance in Feet (Gait) --  pt would take a few steps and say \"i can't\"  -               User Key  (r) = Recorded By, (t) = Taken By, (c) = Cosigned By      Initials Name Provider Type     Terrell Larkin, PT Physical Therapist                   Obj/Interventions       [facility] Name 12/20/23 0920          Range of Motion Comprehensive    General Range of Motion bilateral upper extremity ROM WFL;bilateral lower extremity ROM WFL;neck/trunk range of motion deficits identified  -[facility] Name 12/20/23 0920          Strength Comprehensive (MMT)    General Manual Muscle Testing (MMT) Assessment upper extremity strength deficits identified;lower extremity strength deficits identified  -               User Key  (r) = Recorded By, (t) = Taken By, (c) = Cosigned By      Initials Name Provider Type     Terrell Larkin, PT Physical Therapist                   Goals/Plan       [facility] Name 12/20/23 0920          Transfer Goal 1 (PT)    Activity/Assistive Device (Transfer Goal 1, PT) sit-to-stand/stand-to-sit  -     [place] Level/Cues Needed (Transfer Goal 1, PT) standby assist  -     Time Frame (Transfer Goal 1, PT) 10 days  -     Progress/Outcome (Transfer Goal 1, PT) new goal  -[facility] Name 12/20/23 " "0920          Gait Training Goal 1 (PT)    Activity/Assistive Device (Gait Training Goal 1, PT) gait (walking locomotion);assistive device use;decrease fall risk  -     [place] Level (Gait Training Goal 1, PT) contact guard required  -     Distance (Gait Training Goal 1, PT) 40ft  -     Time Frame (Gait Training Goal 1, PT) 10 days  -     Progress/Outcome (Gait Training Goal 1, PT) new goal  -       Row Name 12/20/23 0920          Therapy Assessment/Plan (PT)    Planned Therapy Interventions (PT) gait training;home exercise program;stretching;strengthening;ROM (range of motion);postural re-education;patient/family education;transfer training  -               User Key  (r) = Recorded By, (t) = Taken By, (c) = Cosigned By      Initials Name Provider Type     Terrell Larkin, PT Physical Therapist                   Clinical Impression       Row Name 12/20/23 0920          Pain    Pretreatment Pain Rating --  c/o HA  -     Pain Location - head  -     Pre/Posttreatment Pain Comment no number given  -     Pain Intervention(s) Medication (See MAR);Music  -       Row Name 12/20/23 0920          Plan of Care Review    Plan of Care Reviewed With patient  -     Outcome Evaluation PT IE complete.  A&Ox4, however, demo conversational confusion.  Today she is SBA bed mobility.  Min A sit<>stand.  Attempted ambulation each time, but pt would say \"i can't\".  Pt was not aggressive, but she did appear agitated at times.  PT to see for gait safety and strengthening.  Recommend SNF at [place].  Thank you for referral.  -       Row Name 12/20/23 0920          Therapy Assessment/Plan (PT)    Patient/Family Therapy Goals Statement (PT) did not state  -     Rehab Potential (PT) good, to achieve stated therapy goals  -     Criteria for Skilled Interventions Met (PT) yes;skilled treatment is necessary  -     Therapy Frequency (PT) 2 times/day  -     Predicted Duration of Therapy Intervention (PT) until [place]  - "       Row Name 12/20/23 0920          Positioning and Restraints    Pre-Treatment Position in bed  -LH     Post Treatment Position bed  -LH     In Bed fowlers;call light within reach;encouraged to call for assist;exit alarm on;side rails up x2  -               User Key  (r) = Recorded By, (t) = Taken By, (c) = Cosigned By      Initials Name Provider Type     Terrell Larkin, PT Physical Therapist                   Outcome Measures       Row Name 12/20/23 0920          How much help from another person do you currently need...    Turning from your back to your side while in flat bed without using bedrails? 3  -LH     Moving from lying on back to sitting on the side of a flat bed without bedrails? 3  -LH     Moving to and from a bed to a chair (including a wheelchair)? 2  -LH     Standing up from a chair using your arms (e.g., wheelchair, bedside chair)? 3  -LH     Climbing 3-5 steps with a railing? 1  -LH     To walk in hospital room? 2  -     AM-PAC 6 Clicks Score (PT) 14  -     Highest Level of Mobility Goal 4 --> Transfer to chair/commode  -       Row Name 12/20/23 0920          Functional Assessment    Outcome Measure Options AM-PAC 6 Clicks Basic Mobility (PT)  -               User Key  (r) = Recorded By, (t) = Taken By, (c) = Cosigned By      Initials Name Provider Type     Terrell Larkin, PT Physical Therapist                                 Physical Therapy Education       Title: PT OT SLP Therapies (Done)       Topic: Physical Therapy (Done)       Point: Mobility training (Done)       Learning Progress Summary             Patient Acceptance, E,D, DU,VU by  at 12/20/2023 1014    Comment: benefits of PT and POC, call for A OOB                         Point: Precautions (Done)       Learning Progress Summary             Patient Acceptance, E,D, DU,VU by  at 12/20/2023 1014    Comment: benefits of PT and POC, call for A OOB                                         User Key       Initials Effective  "Dates Name Provider Type Discipline     02/03/23 -  Terrell Larkin, PT Physical Therapist PT                  PT Recommendation and Plan  Planned Therapy Interventions (PT): gait training, home exercise program, stretching, strengthening, ROM (range of motion), postural re-education, patient/family education, transfer training  Plan of Care Reviewed With: patient  Outcome Evaluation: PT IE complete.  A&Ox4, however, demo conversational confusion.  Today she is SBA bed mobility.  Min A sit<>stand.  Attempted ambulation each time, but pt would say \"i can't\".  Pt was not aggressive, but she did appear agitated at times.  PT to see for gait safety and strengthening.  Recommend SNF at [place].  Thank you for referral.     Time Calculation:         PT Charges       Row Name 12/20/23 1015             Time Calculation    Start Time 0920  -      Stop Time 1000  -      Time Calculation (min) 40 min  -      PT Received On 12/20/23  -      PT Goal Re-Cert Due Date 12/29/23  -         Untimed Charges    PT Eval/Re-eval Minutes 40  -         Total Minutes    Untimed Charges Total Minutes 40  -       Total Minutes 40  -                User Key  (r) = Recorded By, (t) = Taken By, (c) = Cosigned By      Initials Name Provider Type     Terrell Larkin, PT Physical Therapist                  Therapy Charges for Today       Code Description Service Date Service Provider Modifiers Qty    09631381626 HC PT EVAL LOW COMPLEXITY 3 12/20/2023 Terrell Larkin, PT GP 1            PT G-Codes  Outcome Measure Options: AM-PAC 6 Clicks Basic Mobility (PT)  AM-PAC 6 Clicks Score (PT): 14  PT Discharge Summary  Anticipated Discharge Disposition (PT): skilled nursing facility    Terrell Larkin PT  12/20/2023    "
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
## 2023-12-20 NOTE — CASE MANAGEMENT/SOCIAL WORK
Continued Stay Note   [facility]     Patient Name: Jennifer Gomes  MRN: [id number]  Today's Date: 12/20/2023    Admit Date: 12/18/2023    Plan: SNF   Discharge Plan       Row Name 12/20/23 1200       Plan    Plan SNF    Patient/Family in Agreement with Plan yes    Plan Comments Therapy is recommending SNF placement.  SW has left a message for APS worker, Laureano Naranjo ([phone]) to inform and see if he wants SW to speak with pt's  regarding placement.                   Discharge Codes    No documentation.                       SARAH DeviW

## 2023-12-20 NOTE — PROGRESS NOTES
"    [facility] Medicine Services  INPATIENT PROGRESS NOTE    Patient Name: Jennifer Gomes  Date of Admission: 12/18/2023  Today's Date: 12/20/23  Length of Stay: 2  Primary Care Physician: Olivia Mora APRN    Subjective   Chief Complaint: follow up confusion  HPI   She was sitting up in bed.  She is more alert today.  She is only able to tell me her name and tells me \"I do not know where we are.  If I am not there, then I am here.\"  Per RN, she is tolerating diet.  No nausea, vomiting abdominal pain.  No diarrhea today.  She lost IV access.  She is tolerating pills by mouth.    Review of Systems   All pertinent negatives and positives are as above. All other systems have been reviewed and are negative unless otherwise stated.     Objective    Temp:  [97.7 °F (36.5 °C)-99.8 °F (37.7 °C)] 98 °F (36.7 °C)  Heart Rate:  [69-93] 87  Resp:  [16-20] 18  BP: (124-153)/(37-89) 137/89  Physical Exam  Vitals reviewed.   Constitutional:       General: She is not in acute distress.     Appearance: She is ill-appearing. She is not toxic-appearing.      Comments: Lying in bed.  No acute distress.  On room air.  No family at bedside.  Discussed with her nurse uday.   HENT:      Head: Normocephalic and atraumatic.      Mouth/Throat:      Mouth: Mucous membranes are moist.      Pharynx: Oropharynx is clear.   Eyes:      Extraocular Movements: Extraocular movements intact.      Conjunctiva/sclera: Conjunctivae normal.      Pupils: Pupils are equal, round, and reactive to light.   Cardiovascular:      Rate and Rhythm: Normal rate and regular rhythm.      Pulses: Normal pulses.   Pulmonary:      Effort: Pulmonary effort is normal. No respiratory distress.      Breath sounds: Normal breath sounds. No wheezing or rhonchi.   Abdominal:      General: Bowel sounds are normal. There is no distension.      Palpations: Abdomen is soft.      Tenderness: There is no abdominal tenderness. " "  Musculoskeletal:         General: No swelling or tenderness. Normal range of motion.      Cervical back: Normal range of motion and neck supple. No muscular tenderness.   Skin:     General: Skin is warm and dry.      Findings: No erythema or rash.   Neurological:      General: No focal deficit present.      Mental Status: She is alert.      Cranial Nerves: No cranial nerve deficit.      Motor: No weakness.      Comments: More awake and alert today.  Oriented to self.  Follows simple commands.   Psychiatric:         Mood and Affect: Mood normal.         Behavior: Behavior normal.       Results Review:  I have reviewed the labs, radiology results, and diagnostic studies.    Laboratory Data:   Results from last 7 days   Lab Units 12/20/23  0529 12/19/23  0809 12/18/23  1226   WBC 10*3/mm3 8.07 11.02* 22.67*   HEMOGLOBIN g/dL 8.3* 7.5* 10.1*   HEMATOCRIT % 27.1* 24.1* 32.6*   PLATELETS 10*3/mm3 179 172 223        Results from last 7 days   Lab Units 12/20/23  0529 12/19/23  1819 12/19/23  0809 12/18/23  1311   SODIUM mmol/L 143  --  143 140   POTASSIUM mmol/L 2.9* 2.5* 1.9* 2.2*   CHLORIDE mmol/L 113*  --  111* 104   CO2 mmol/L 16.0*  --  15.0* 11.0*   BUN mg/dL 29*  --  41* 41*   CREATININE mg/dL 1.06*  --  1.52* 2.14*   CALCIUM mg/dL 9.0  --  7.9* 7.7*   BILIRUBIN mg/dL  --   --   --  <0.2   ALK PHOS U/L  --   --   --  76   ALT (SGPT) U/L  --   --   --  <5   AST (SGOT) U/L  --   --   --  10   GLUCOSE mg/dL 100*  --  123* 104*       Culture Data:   No results found for: \"ACANTHNAEG\", \"AFBCX\", \"BPERTUSSISCX\", \"BLOODCX\"  No results found for: \"BCIDPCR\", \"CXREFLEX\", \"CSFCX\", \"CULTURETIS\"  No results found for: \"CULTURES\", \"HSVCX\", \"URCX\"  No results found for: \"EYECULTURE\", \"GCCX\", \"HSVCULTURE\", \"LABHSV\"  No results found for: \"LEGIONELLA\", \"MRSACX\", \"MUMPSCX\", \"MYCOPLASCX\"  No results found for: \"NOCARDIACX\", \"STOOLCX\"  No results found for: \"THROATCX\", \"UNSTIMCULT\", \"URINECX\", \"CULTURE\", \"VZVCULTUR\"  No results found " "for: \"VIRALCULTU\", \"WOUNDCX\"    Radiology Data:   Imaging Results (Last 24 Hours)       ** No results found for the last 24 hours. **            I have reviewed the patient's current medications.     Assessment/Plan   Assessment  Active Hospital Problems    Diagnosis     **Hypokalemia     UTI (urinary tract infection)     Toxic metabolic encephalopathy     Acute renal failure superimposed on stage 3 chronic kidney disease      Ms. Gomes is an 83-year-old female who presented to [facility] on 12/18 for worsening confusion and fall.  She is on Aricept but there is no diagnosis of dementia and patient and her  deny this.  Of note, she was admitted to our hospital in November 2023 for acute urinary tract infection with bacteremia due to Enterococcus.  She was discharged home on Augmentin.  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  Reportedly patient fell so she was brought to the emergency department.  She has had poor oral intake and may have had some diarrhea.  In the ED, urinalysis suggestive of urinary tract infection.  Creatinine elevated at 2.14 consistent with acute kidney injury as well as hypokalemia with potassium 2.2.  CT head showed no acute findings.  Pelvis x-ray showed no acute findings.  Chest x-ray showed no acute findings.  1 L fluid bolus given in ED.    Treatment Plan  Acute urinary tract infection.  Urine culture in November 2023 showed E. coli only resistant to Levaquin.  Blood culture with no growth to date.  Urine culture showed mixed irina.  Plan to treat with 3-day course of antibiotics.    Hypokalemia, 2.9.  Oral potassium replacement.    Acute kidney injury with creatinine 2.14.  Creatinine on 11/8/2023 was 1.6.  Gentle IV fluid.  Creatinine today improved at 1.06.  Discontinue IV fluid.  Encourage p.o. intake.    Reportedly had diarrhea prior to admission.  She recently completed antibiotic treatment with Augmentin.  C. difficile PCR is positive, C. Difficile toxin " antigen is negative.  Afebrile and WBC has improved from 22 down to 8.  No abdominal complaints.  Continue oral vancomycin.    Hemoglobin 10.1 on admission.  She has received IV fluid.  No signs of bleeding.  During last hospitalization hemoglobin was down to 6.5.  Patient refused blood transfusion.  Hemoglobin was 7.3 at time of discharge.  Reviewed prior records of colonoscopy and EGD.  She has been found with diverticulosis.  She also had shown ulcer with no stigmata of bleeding on EGD. Patient was started on Retacrit and appointment made for patient to see hematology on 1/10/2024.  Hemoglobin today 8.3.    SCDs for DVT prophylaxis.    PT/OT.  Therapy recommends SNF.    Medical Decision Making  Number and Complexity of problems: 5 acute problems in the form of acute urinary tract infection, hypokalemia, acute kidney injury, c. Difficile, and nomocytic anemia  Differential Diagnosis: None considered at present    Conditions and Status        Condition is unchanged.     [facility] Data  External documents reviewed: Prior epic records  Cardiac tracing (EKG, telemetry) interpretation: Reviewed  Radiology interpretation: Interpreted by radiology  Labs reviewed: As above  Any tests that were considered but not ordered: None considered at present     Decision rules/scores evaluated (example [id number], [place], etc): None considered at present     Discussed with: Patient and Dr. Tirado     Care Planning  Shared decision making: Patient is agreeable to ongoing workup and treatment  Code status and discussions: Full code with full interventions    Disposition  Social Determinants of Health that impact treatment or disposition: none  I expect the patient to be discharged to home versus SNF in 1-2 days.     Electronically signed by MORGAN Ho, 12/20/23, 12:48 CST.

## 2023-12-20 NOTE — PLAN OF CARE
"  Problem: Adult Inpatient Plan of Care  Goal: Plan of Care Review  Recent Flowsheet Documentation  Taken 12/20/2023 0920 by Terrell Larkin, PT  Plan of Care Reviewed With: patient  Outcome Evaluation: PT IE complete.  A&Ox4, however, demo conversational confusion.  Today she is SBA bed mobility.  Min A sit<>stand.  Attempted ambulation each time, but pt would say \"i can't\".  Pt was not aggressive, but she did appear agitated at times.  PT to see for gait safety and strengthening.  Recommend SNF at [place].  Thank you for referral.   Goal Outcome Evaluation:  Plan of Care Reviewed With: patient           Outcome Evaluation: PT IE complete.  A&Ox4, however, demo conversational confusion.  Today she is SBA bed mobility.  Min A sit<>stand.  Attempted ambulation each time, but pt would say \"i can't\".  Pt was not aggressive, but she did appear agitated at times.  PT to see for gait safety and strengthening.  Recommend SNF at [place].  Thank you for referral.      Anticipated Discharge Disposition (PT): skilled nursing facility  "

## 2023-12-21 LAB
ANION GAP SERPL CALCULATED.3IONS-SCNC: 13 MMOL/L (ref 5–15)
ANISOCYTOSIS BLD QL: ABNORMAL
BUN SERPL-MCNC: 21 MG/DL (ref 8–23)
BUN/CREAT SERPL: 21.6 (ref 7–25)
BURR CELLS BLD QL SMEAR: ABNORMAL
CALCIUM SPEC-SCNC: 9.2 MG/DL (ref 8.6–10.5)
CHLORIDE SERPL-SCNC: 112 MMOL/L (ref 98–107)
CO2 SERPL-SCNC: 15 MMOL/L (ref 22–29)
CREAT SERPL-MCNC: 0.97 MG/DL (ref 0.57–1)
DEPRECATED RDW RBC AUTO: 60.1 FL (ref 37–54)
EGFRCR SERPLBLD CKD-EPI 2021: 58.8 ML/MIN/1.73
EOSINOPHIL # BLD MANUAL: 0.2 10*3/MM3 (ref 0–0.4)
EOSINOPHIL NFR BLD MANUAL: 4.1 % (ref 0.3–6.2)
ERYTHROCYTE [DISTWIDTH] IN BLOOD BY AUTOMATED COUNT: 16.5 % (ref 12.3–15.4)
GLUCOSE SERPL-MCNC: 125 MG/DL (ref 65–99)
HCT VFR BLD AUTO: 32.3 % (ref 34–46.6)
HGB BLD-MCNC: 9.2 G/DL (ref 12–15.9)
LYMPHOCYTES # BLD MANUAL: 0.87 10*3/MM3 (ref 0.7–3.1)
LYMPHOCYTES NFR BLD MANUAL: 5.2 % (ref 5–12)
MAGNESIUM SERPL-MCNC: 2 MG/DL (ref 1.6–2.4)
MCH RBC QN AUTO: 28.2 PG (ref 26.6–33)
MCHC RBC AUTO-ENTMCNC: 28.5 G/DL (ref 31.5–35.7)
MCV RBC AUTO: 99.1 FL (ref 79–97)
MONOCYTES # BLD: 0.26 10*3/MM3 (ref 0.1–0.9)
NEUTROPHILS # BLD AUTO: 3.65 10*3/MM3 (ref 1.7–7)
NEUTROPHILS NFR BLD MANUAL: 73.2 % (ref 42.7–76)
NRBC SPEC MANUAL: 1 /100 WBC (ref 0–0.2)
PLAT MORPH BLD: NORMAL
PLATELET # BLD AUTO: 172 10*3/MM3 (ref 140–450)
PMV BLD AUTO: 11 FL (ref 6–12)
POIKILOCYTOSIS BLD QL SMEAR: ABNORMAL
POLYCHROMASIA BLD QL SMEAR: ABNORMAL
POTASSIUM SERPL-SCNC: 3.7 MMOL/L (ref 3.5–5.2)
POTASSIUM SERPL-SCNC: 4.3 MMOL/L (ref 3.5–5.2)
RBC # BLD AUTO: 3.26 10*6/MM3 (ref 3.77–5.28)
SODIUM SERPL-SCNC: 140 MMOL/L (ref 136–145)
TARGETS BLD QL SMEAR: ABNORMAL
VARIANT LYMPHS NFR BLD MANUAL: 17.5 % (ref 19.6–45.3)
WBC MORPH BLD: NORMAL
WBC NRBC COR # BLD AUTO: 4.98 10*3/MM3 (ref 3.4–10.8)

## 2023-12-21 PROCEDURE — 84132 ASSAY OF SERUM POTASSIUM: CPT | Performed by: INTERNAL MEDICINE

## 2023-12-21 PROCEDURE — 97116 GAIT TRAINING THERAPY: CPT

## 2023-12-21 PROCEDURE — 97110 THERAPEUTIC EXERCISES: CPT

## 2023-12-21 PROCEDURE — 83735 ASSAY OF MAGNESIUM: CPT | Performed by: NURSE PRACTITIONER

## 2023-12-21 PROCEDURE — 80048 BASIC METABOLIC PNL TOTAL CA: CPT | Performed by: INTERNAL MEDICINE

## 2023-12-21 PROCEDURE — 85025 COMPLETE CBC W/AUTO DIFF WBC: CPT | Performed by: INTERNAL MEDICINE

## 2023-12-21 PROCEDURE — 85007 BL SMEAR W/DIFF WBC COUNT: CPT | Performed by: INTERNAL MEDICINE

## 2023-12-21 RX ORDER — LEVOTHYROXINE SODIUM 0.05 MG/1
50 TABLET ORAL
Status: DISCONTINUED | OUTPATIENT
Start: 2023-12-22 | End: 2023-12-24 | Stop reason: HOSPADM

## 2023-12-21 RX ORDER — QUETIAPINE FUMARATE 25 MG/1
25 TABLET, FILM COATED ORAL NIGHTLY
Status: DISCONTINUED | OUTPATIENT
Start: 2023-12-21 | End: 2023-12-24 | Stop reason: HOSPADM

## 2023-12-21 RX ORDER — OXYCODONE HYDROCHLORIDE 15 MG/1
15 TABLET, FILM COATED, EXTENDED RELEASE ORAL EVERY 12 HOURS PRN
Status: DISCONTINUED | OUTPATIENT
Start: 2023-12-21 | End: 2023-12-24 | Stop reason: HOSPADM

## 2023-12-21 RX ORDER — CARVEDILOL 6.25 MG/1
12.5 TABLET ORAL 2 TIMES DAILY WITH MEALS
Status: DISCONTINUED | OUTPATIENT
Start: 2023-12-21 | End: 2023-12-24 | Stop reason: HOSPADM

## 2023-12-21 RX ADMIN — VANCOMYCIN HYDROCHLORIDE 125 MG: 125 CAPSULE ORAL at 14:19

## 2023-12-21 RX ADMIN — OXYCODONE HYDROCHLORIDE 15 MG: 15 TABLET, FILM COATED, EXTENDED RELEASE ORAL at 14:18

## 2023-12-21 RX ADMIN — POTASSIUM CHLORIDE 40 MEQ: 10 CAPSULE, COATED, EXTENDED RELEASE ORAL at 03:01

## 2023-12-21 RX ADMIN — CYCLOBENZAPRINE 10 MG: 10 TABLET, FILM COATED ORAL at 21:41

## 2023-12-21 RX ADMIN — DONEPEZIL HYDROCHLORIDE 10 MG: 10 TABLET, FILM COATED ORAL at 21:41

## 2023-12-21 RX ADMIN — VANCOMYCIN HYDROCHLORIDE 125 MG: 125 CAPSULE ORAL at 03:01

## 2023-12-21 RX ADMIN — VANCOMYCIN HYDROCHLORIDE 125 MG: 125 CAPSULE ORAL at 21:41

## 2023-12-21 RX ADMIN — ATORVASTATIN CALCIUM 40 MG: 40 TABLET ORAL at 09:38

## 2023-12-21 RX ADMIN — FAMOTIDINE 20 MG: 20 TABLET, FILM COATED ORAL at 09:38

## 2023-12-21 RX ADMIN — CYCLOBENZAPRINE 10 MG: 10 TABLET, FILM COATED ORAL at 09:38

## 2023-12-21 RX ADMIN — ACETAMINOPHEN 650 MG: 325 TABLET, FILM COATED ORAL at 01:35

## 2023-12-21 RX ADMIN — ACETAMINOPHEN 650 MG: 325 TABLET, FILM COATED ORAL at 16:48

## 2023-12-21 RX ADMIN — QUETIAPINE FUMARATE 25 MG: 25 TABLET, FILM COATED ORAL at 21:41

## 2023-12-21 RX ADMIN — VANCOMYCIN HYDROCHLORIDE 125 MG: 125 CAPSULE ORAL at 09:38

## 2023-12-21 RX ADMIN — CARVEDILOL 12.5 MG: 6.25 TABLET, FILM COATED ORAL at 17:50

## 2023-12-21 NOTE — PLAN OF CARE
Goal Outcome Evaluation:   Encourage call light use, bed in low position,bed alarm on,safety rounds

## 2023-12-21 NOTE — PROGRESS NOTES
[facility] Medicine Services  INPATIENT PROGRESS NOTE    Patient Name: Jennifer Gomes  Date of Admission: 12/18/2023  Today's Date: 12/21/23  Length of Stay: 3  Primary Care Physician: Olivia Mora APRN    Subjective   Chief Complaint: follow up confusion  HPI   She was sitting up in bed with sitter at bedside.  She has been sleeping today as she was restless during the night.  She has had no further diarrhea.  Tolerating diet.  No acute complaints.    Plan of care discussed with her daughter Ivonne via telephone.  Patient lives at home with spouse and daughter.  Normally she can walk short distances at a slow pace with rollator.  Daughter states she has a history of dementia and takes Aricept.  She saw patient yesterday and states that mental status is close to baseline.  Discussed with her daughter that therapy is commending skilled nursing facility placement.  Daughter is in agreement.  Informed Uma  to discussions SNF options.    Review of Systems   All pertinent negatives and positives are as above. All other systems have been reviewed and are negative unless otherwise stated.     Objective    Temp:  [97.2 °F (36.2 °C)-97.8 °F (36.6 °C)] 97.8 °F (36.6 °C)  Heart Rate:  [76-79] 79  Resp:  [18-20] 18  BP: (130-158)/(52-85) 136/84  Physical Exam  Vitals reviewed.   Constitutional:       General: She is not in acute distress.     Appearance: She is not toxic-appearing.      Comments: Lying in bed.  No acute distress.  On room air.  No family at bedside.  Discussed with her nurse kenn.   HENT:      Head: Normocephalic and atraumatic.      Mouth/Throat:      Mouth: Mucous membranes are moist.      Pharynx: Oropharynx is clear.   Eyes:      Extraocular Movements: Extraocular movements intact.      Conjunctiva/sclera: Conjunctivae normal.      Pupils: Pupils are equal, round, and reactive to light.   Cardiovascular:      Rate and Rhythm: Normal rate and  regular rhythm.      Pulses: Normal pulses.   Pulmonary:      Effort: Pulmonary effort is normal. No respiratory distress.      Breath sounds: Normal breath sounds. No wheezing or rhonchi.   Abdominal:      General: Bowel sounds are normal. There is no distension.      Palpations: Abdomen is soft.      Tenderness: There is no abdominal tenderness.   Musculoskeletal:         General: No swelling or tenderness. Normal range of motion.      Cervical back: Normal range of motion and neck supple. No muscular tenderness.   Skin:     General: Skin is warm and dry.      Findings: No erythema or rash.   Neurological:      General: No focal deficit present.      Mental Status: She is alert. Mental status is at baseline.      Cranial Nerves: No cranial nerve deficit.      Motor: No weakness.   Psychiatric:         Mood and Affect: Mood normal.         Behavior: Behavior normal.       Results Review:  I have reviewed the labs, radiology results, and diagnostic studies.    Laboratory Data:   Results from last 7 days   Lab Units 12/21/23  0356 12/20/23  0529 12/19/23  0809   WBC 10*3/mm3 4.98 8.07 11.02*   HEMOGLOBIN g/dL 9.2* 8.3* 7.5*   HEMATOCRIT % 32.3* 27.1* 24.1*   PLATELETS 10*3/mm3 172 179 172        Results from last 7 days   Lab Units 12/21/23  0817 12/21/23  0356 12/20/23  1548 12/20/23  0529 12/19/23  1819 12/19/23  0809 12/18/23  1311   SODIUM mmol/L  --  140  --  143  --  143 140   POTASSIUM mmol/L 4.3 3.7 3.2* 2.9*   < > 1.9* 2.2*   CHLORIDE mmol/L  --  112*  --  113*  --  111* 104   CO2 mmol/L  --  15.0*  --  16.0*  --  15.0* 11.0*   BUN mg/dL  --  21  --  29*  --  41* 41*   CREATININE mg/dL  --  0.97  --  1.06*  --  1.52* 2.14*   CALCIUM mg/dL  --  9.2  --  9.0  --  7.9* 7.7*   BILIRUBIN mg/dL  --   --   --   --   --   --  <0.2   ALK PHOS U/L  --   --   --   --   --   --  76   ALT (SGPT) U/L  --   --   --   --   --   --  <5   AST (SGOT) U/L  --   --   --   --   --   --  10   GLUCOSE mg/dL  --  125*  --  100*  --   "123* 104*    < > = values in this interval not displayed.       Culture Data:   No results found for: \"ACANTHNAEG\", \"AFBCX\", \"BPERTUSSISCX\", \"BLOODCX\"  No results found for: \"BCIDPCR\", \"CXREFLEX\", \"CSFCX\", \"CULTURETIS\"  No results found for: \"CULTURES\", \"HSVCX\", \"URCX\"  No results found for: \"EYECULTURE\", \"GCCX\", \"HSVCULTURE\", \"LABHSV\"  No results found for: \"LEGIONELLA\", \"MRSACX\", \"MUMPSCX\", \"MYCOPLASCX\"  No results found for: \"NOCARDIACX\", \"STOOLCX\"  No results found for: \"THROATCX\", \"UNSTIMCULT\", \"URINECX\", \"CULTURE\", \"VZVCULTUR\"  No results found for: \"VIRALCULTU\", \"WOUNDCX\"    Radiology Data:   Imaging Results (Last 24 Hours)       ** No results found for the last 24 hours. **            I have reviewed the patient's current medications.     Assessment/Plan   Assessment  Active Hospital Problems    Diagnosis     **Hypokalemia     UTI (urinary tract infection)     Toxic metabolic encephalopathy     Acute renal failure superimposed on stage 3 chronic kidney disease      Ms. Gomes is an 83-year-old female who presented to [facility] on 12/18 for worsening confusion and fall.  She is on Aricept but there is no diagnosis of dementia and patient and her  deny this.  Of note, she was admitted to our hospital in November 2023 for acute urinary tract infection with bacteremia due to Enterococcus.  She was discharged home on Augmentin.  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  Reportedly patient fell so she was brought to the emergency department.  She has had poor oral intake and may have had some diarrhea.  In the ED, urinalysis suggestive of urinary tract infection.  Creatinine elevated at 2.14 consistent with acute kidney injury as well as hypokalemia with potassium 2.2.  CT head showed no acute findings.  Pelvis x-ray showed no acute findings.  Chest x-ray showed no acute findings.  1 L fluid bolus given in ED.    Treatment Plan  Acute urinary tract infection.  Urine culture in November 2023 " showed E. coli only resistant to Levaquin.  Blood culture with no growth to date.  Urine culture showed mixed irina.  She completed 3-day course of antibiotics on 12/20.    Hypokalemia now resolved.    Acute kidney injury with creatinine 2.14.  Creatinine on 11/8/2023 was 1.6.  Gentle IV fluid.  Creatinine today improved at 0.97.  Encourage p.o. intake.    Reportedly had diarrhea prior to admission.  She recently completed antibiotic treatment with Augmentin.  C. difficile PCR is positive, C. Difficile toxin antigen is negative.  Afebrile and WBC has improved from 22 down to 8.  No abdominal complaints.  Continue oral vancomycin.    Hemoglobin 10.1 on admission.  She has received IV fluid.  No signs of bleeding.  During last hospitalization hemoglobin was down to 6.5.  Patient refused blood transfusion.  Hemoglobin was 7.3 at time of discharge.  Reviewed prior records of colonoscopy and EGD.  She has been found with diverticulosis.  She also had shown ulcer with no stigmata of bleeding on EGD. Patient was started on Retacrit and appointment made for patient to see hematology on 1/10/2024.  Hemoglobin today 9.2.    SCDs for DVT prophylaxis.    PT/OT.  Therapy recommends SNF.  Daughter is in agreement.  Referrals to [facility], [facility], [facility] and [facility].    Medical Decision Making  Number and Complexity of problems: 5 acute problems in the form of acute urinary tract infection, hypokalemia, acute kidney injury, c. Difficile, and nomocytic anemia  Differential Diagnosis: None considered at present    Conditions and Status        Condition is unchanged.     [facility] Data  External documents reviewed: Prior [facility] records  Cardiac tracing (EKG, telemetry) interpretation: Reviewed  Radiology interpretation: Interpreted by radiology  Labs reviewed: As above  Any tests that were considered but not ordered: None considered at present     Decision rules/scores evaluated (example [id number], Wells, etc): None  considered at present     Discussed with: Patient and Dr. Tirado     Care Planning  Shared decision making: Patient is agreeable to ongoing workup and treatment  Code status and discussions: Full code with full interventions    Disposition  Social Determinants of Health that impact treatment or disposition: none  I expect the patient to be discharged to SNF in 1-2 days.     Electronically signed by MORGAN Ho, 12/21/23, 11:56 CST.

## 2023-12-21 NOTE — CASE MANAGEMENT/SOCIAL WORK
Continued Stay Note   [place]     Patient Name: Jennifer Gomes  MRN: [id number]  Today's Date: 12/21/2023    Admit Date: 12/18/2023    Plan: Home   Discharge Plan       Row Name 12/21/23 0847       Plan    Plan Home    Patient/Family in Agreement with Plan yes    Plan Comments SW has left another message for spouse, Adams, at [phone] to discuss dc planning.  Ana MORA states family wants pt to return home.  BRITTANI will notifiy APS worker, Laureano Naranjo when pt discharges.    8:51-  BRITTANI spoke to pt's daughter, Lola at [phone].  She stated she was going to speak with family and pt today and would let SW know later today if they want placement.                   Discharge Codes    No documentation.                 Expected Discharge Date and Time       Expected Discharge Date Expected Discharge Time    Dec 21, 2023               SARAH DeviW

## 2023-12-21 NOTE — CASE MANAGEMENT/SOCIAL WORK
Continued Stay Note  [facility]     Patient Name: Jennifer Gomes  MRN: [id number]  Today's Date: 12/21/2023    Admit Date: 12/18/2023    Plan: SNF   Discharge Plan       Row Name 12/21/23 1125       Plan    Plan SNF    Patient/Family in Agreement with Plan yes    Provided Post Acute Provider List? Yes    Post Acute Provider List Nursing Home    Delivered To Support Person    Support Person Dtr.    Method of Delivery Telephone    Plan Comments Pt and family have decided on placement.  They have requested referrals to [facility], [facility] and [facility].  BRITTANI has faxed referrals out and will follow for bed offers/denials.      Row Name 12/21/23 8050       Plan    Plan Home    Patient/Family in Agreement with Plan yes    Plan Comments BRITTANI has left another message for spouse, Adams, at [phone] to discuss dc planning.  Ana MORA states family wants pt to return home.  BRITTANI will notifiy APS worker, Laureano Naranjo when pt discharges.                   Discharge Codes    No documentation.                 Expected Discharge Date and Time       Expected Discharge Date Expected Discharge Time    Dec 21, 2023               JEFRY Devi

## 2023-12-21 NOTE — PLAN OF CARE
Goal Outcome Evaluation:  Plan of Care Reviewed With: patient        Progress: improving  Outcome Evaluation: Pt was   in bed, agreeable to therapy.  Transfer supine to sitting with min assist using the bed rail. Transfered sit to stand with min assist.  Amb 15' with RWX and min assist.  Pt stated she uses a rollator at home.  She is unable to stand upright, and leaned onto the walker with her elbows.  Pt would benefit from continued therapy to increase strength, improve safety, and progess mobility.

## 2023-12-21 NOTE — THERAPY TREATMENT NOTE
Acute Care - Physical Therapy Treatment Note  [facility]     Patient Name: Jennifer Gomes  : 1940  MRN: [id number]  Today's Date: 2023      Visit Dx:     ICD-10-CM ICD-9-CM   1. Acute renal failure, unspecified acute renal failure type  N17.9 584.9   2. Hypokalemia  E87.6 276.8   3. Urinary tract infection without hematuria, site unspecified  N39.0 599.0   4. Altered mental status, unspecified altered mental status type  R41.82 780.97   5. Impaired mobility [Z74.09]  Z74.09 799.89   6. Impaired mobility and ADLs [Z74.09, Z78.9]  Z74.09 V49.89    Z78.9      Patient Active Problem List   Diagnosis    Gastroesophageal reflux disease    Spinal stenosis, lumbar region, without neurogenic claudication    Overweight    Non-smoker    Erosion of vaginal mesh    Adenocarcinoma of endometrium    Encounter for consultation    S/P hysterectomy with oophorectomy    Encounter for follow-up surveillance of endometrial cancer    History of radiation therapy    Obesity (BMI 30-39.9)    Non-traumatic rhabdomyolysis    Metabolic encephalopathy    Acute renal failure superimposed on stage 3 chronic kidney disease    Acute respiratory failure with hypoxia and hypercapnia    Medical non-compliance, does not take narcotics as prescribed    SIRS (systemic inflammatory response syndrome)    Chronic constipation    Chronic pain syndrome    Chronic prescription opiate use    Anemia    Hypothyroidism (acquired)    Essential hypertension    TOMÁS (acute kidney injury)    Venous insufficiency of both lower extremities    Fluid retention    Low blood pressure reading    Obesity, Class III, BMI 40-49.9 (morbid obesity)    Chronic intractable headache    RAFAL (obstructive sleep apnea)    Change in bowel habits    Acute encephalopathy due to UTI    Toxic metabolic encephalopathy    Polypharmacy    Frequent falls    Grade III internal hemorrhoids    External hemorrhoids    E. coli UTI    Colitis    Moderate malnutrition    Transaminitis     Acute cholecystitis    Acute UTI (urinary tract infection)    Polypharmacy    Altered mental status    UTI (urinary tract infection)    Bacteremia due to Enterococcus    Dementia    Hypokalemia     Past Medical History:   Diagnosis Date    Anxiety     Arthritis     Bronchitis     Cancer     uterine    Chronic pain     Depression     Disease of thyroid gland     Fibromyalgia     GERD (gastroesophageal reflux disease)     Headache     History of transfusion     AS     Hyperlipidemia     Hypertension     Incontinence     Insomnia     Leg pain     Lumbar stenosis     Migraines     Peptic ulcer     Restless legs     Sleep apnea     NO C-PAP    UTI (urinary tract infection)     Vaginal bleeding      Past Surgical History:   Procedure Laterality Date    BLADDER REPAIR      MESH HAD TO BE REMOVED IN 2013    BREAST BIOPSY Right 2017    benign    BREAST CYST EXCISION Left     CARDIAC CATHETERIZATION      CARPAL TUNNEL RELEASE      CATARACT EXTRACTION W/ INTRAOCULAR LENS  IMPLANT, BILATERAL      CHOLECYSTECTOMY WITH INTRAOPERATIVE CHOLANGIOGRAM N/A 2023    Procedure: CHOLECYSTECTOMY LAPAROSCOPIC INTRAOPERATIVE CHOLANGIOGRAM;  Surgeon: Gerardo Arciniega MD;  Location: [facility] OR;  Service: General;  Laterality: N/A;    COLONOSCOPY      COLONOSCOPY N/A 10/01/2021    Procedure: COLONOSCOPY WITH ANESTHESIA;  Surgeon: Tom Velasco DO;  Location: [facility] ENDOSCOPY;  Service: Gastroenterology;  Laterality: N/A;  pre: change in bowel habits  post: diverticulosis. hemorrhoids.   Olivia Mora APRN        CYSTECTOMY      D & C HYSTEROSCOPY N/A 2017    Procedure: DILATATION AND CURETTAGE HYSTEROSCOPY;  Surgeon: Shasta Madrigal MD;  Location: [facility] OR;  Service:     DILATION AND CURETTAGE, DIAGNOSTIC / THERAPEUTIC      ENDOSCOPY  2010    Short segment of Arriola's,Moderate chroninc esophagogastritis and negative H.pylori    ENDOSCOPY N/A 2017    Procedure: ESOPHAGOGASTRODUODENOSCOPY WITH  ANESTHESIA;  Surgeon: oTm Velasco DO;  Location: [facility] ENDOSCOPY;  Service:     EYE SURGERY      RETINA    HEMORRHOIDECTOMY SIGMOIDOSCOPY N/A 3/21/2023    Procedure: HEMORRHOIDECTOMY WITH EXAM UNDER ANESTHESIA;  Surgeon: Holly Chavez MD;  Location: [facility] OR;  Service: General;  Laterality: N/A;    HYSTERECTOMY  12/20/2017    ORIF TIBIA/FIBULA FRACTURES Left 2000    TRANSVAGINAL TAPING SUSPENSION N/A 11/06/2017    Procedure: VAGINAL MESH REVISION;  Surgeon: Shasta Madrigal MD;  Location:  [facility] OR;  Service:     VAGINAL MESH REVISION  2013     PT Assessment (last 12 hours)       PT Evaluation and Treatment       Row Name 12/21/23 1425 12/21/23 1057       Physical Therapy Time and Intention    Subjective Information complains of;fatigue  -OUSMANE --    Document Type therapy note (daily note)  -OUSMANE therapy note (daily note)  -OUSMANE    Mode of Treatment physical therapy  -OUSMANE physical therapy  -OUSMANE    Comment, Session Not Performed -- pt and family talking with nsg, will check back  -OUSMANE    Comment confusion  -OUSMANE --      Row Name 12/21/23 1043          Physical Therapy Time and Intention    Document Type --  -OUSMANE     Mode of Treatment --  -OUSMANE       Row Name 12/21/23 1425          General Information    Existing Precautions/Restrictions fall  -OUSMANE       Row Name 12/21/23 1425          Pain    Pretreatment Pain Rating 0/10 - no pain  -OUSMANE     Posttreatment Pain Rating 0/10 - no pain  -OUSMANE       Row Name 12/21/23 1425          Bed Mobility    Supine-Sit [place] (Bed Mobility) verbal cues;standby assist  -OUSMANE     Sit-Supine [place] (Bed Mobility) standby assist  -OUSMANE     Assistive Device (Bed Mobility) bed rails  -OUSMANE       Row Name 12/21/23 1425          Transfers    Transfers sit-stand transfer;stand-sit transfer  -OUSMANE     Comment, (Transfers) pt stood x 3, stated she uses a rollator at home  -OUSMANE       Row Name 12/21/23 1425          Sit-Stand Transfer    Sit-Stand [place] (Transfers) verbal cues;minimum assist  (75% patient effort)  -OUSMANE     Assistive Device (Sit-Stand Transfers) walker, front-wheeled  -OUSMANE       Row Name 12/21/23 1425          Stand-Sit Transfer    Stand-Sit [place] (Transfers) verbal cues;minimum assist (75% patient effort)  -OUSMANE     Assistive Device (Stand-Sit Transfers) walker, front-wheeled  -OUSMANE       Row Name 12/21/23 1425          Gait/Stairs (Locomotion)    [place] Level (Gait) verbal cues;minimum assist (75% patient effort)  -OUSMANE     Assistive Device (Gait) walker, front-wheeled  -OUSMANE     Distance in Feet (Gait) 15  -OUSMANE     Comment, (Gait/Stairs) pt stated she is unable to stand up straight, she leaned over onto the walker with her forarms resting on the hand   -OUSMANE       Row Name 12/21/23 1425          Motor Skills    Comments, Therapeutic Exercise sitting AROM BLE X 10  -OUSMANE     Additional Documentation Comments, Therapeutic Exercise (Row)  -OUSMANE       Row Name 12/21/23 1425          Positioning and Restraints    Pre-Treatment Position in bed  -OUSMANE     Post Treatment Position bed  -OUSMANE     In Bed supine;call light within reach;encouraged to call for assist;exit alarm on;with other staff;side rails up x2  -OUSMANE               User Key  (r) = Recorded By, (t) = Taken By, (c) = Cosigned By      Initials Name Provider Type    Sadi Sunshine PTA Physical Therapist Assistant                    Physical Therapy Education       Title: PT OT SLP Therapies (In Progress)       Topic: Physical Therapy (Done)       Point: Mobility training (Done)       Learning Progress Summary             Patient Acceptance, E,D, DU,VU by  at 12/20/2023 1014    Comment: benefits of PT and POC, call for A OOB                         Point: Precautions (Done)       Learning Progress Summary             Patient Acceptance, E,D, DU,VU by  at 12/20/2023 1014    Comment: benefits of PT and POC, call for A OOB                                         User Key       Initials Effective Dates Name Provider Type Discipline      02/03/23 -  Terrell Larkin, PT Physical Therapist PT                  PT Recommendation and Plan     Plan of Care Reviewed With: patient  Progress: improving  Outcome Evaluation: Pt was   in bed, agreeable to therapy.  Transfer supine to sitting with min assist using the bed rail. Transfered sit to stand with min assist.  Amb 15' with RWX and min assist.  Pt stated she uses a rollator at home.  She is unable to stand upright, and leaned onto the walker with her elbows.  Pt would benefit from continued therapy to increase strength, improve safety, and progess mobility.   Outcome Measures       Row Name 12/21/23 1425             How much help from another person do you currently need...    Turning from your back to your side while in flat bed without using bedrails? 3  -OUSMANE      Moving from lying on back to sitting on the side of a flat bed without bedrails? 3  -OUSMANE      Moving to and from a bed to a chair (including a wheelchair)? 3  -OUSMANE      Standing up from a chair using your arms (e.g., wheelchair, bedside chair)? 3  -OUSMANE      Climbing 3-5 steps with a railing? 1  -OUSMANE      To walk in hospital room? 3  -OUSMANE      AM-PAC 6 Clicks Score (PT) 16  -OUSMANE      Highest Level of Mobility Goal 5 --> Static standing  -OUSMANE         Functional Assessment    Outcome Measure Options AM-PAC 6 Clicks Basic Mobility (PT)  -OUSMANE                User Key  (r) = Recorded By, (t) = Taken By, (c) = Cosigned By      Initials Name Provider Type    Sadi Sunshine, PTA Physical Therapist Assistant                     Time Calculation:    PT Charges       Row Name 12/21/23 1425             Time Calculation    Start Time 1425  -OUSMANE      Stop Time 1456  -OUSMANE      Time Calculation (min) 31 min  -OUSMANE      PT Received On 12/21/23  -OUSMANE         Time Calculation- PT    Total Timed Code Minutes- PT 31 minute(s)  -OUSMANE         Timed Charges    09694 - PT Therapeutic Exercise Minutes 13  -OUSMANE      84728 - Gait Training Minutes  18  -OUSMANE         Total Minutes     Timed Charges Total Minutes 31  -OUSMANE       Total Minutes 31  -OUSMANE                User Key  (r) = Recorded By, (t) = Taken By, (c) = Cosigned By      Initials Name Provider Type    Sadi Sunshine PTA Physical Therapist Assistant                  Therapy Charges for Today       Code Description Service Date Service Provider Modifiers Qty    06011365301 HC GAIT TRAINING EA 15 MIN 12/21/2023 Sadi Shah, CHANDLER GP 1    23136735414 HC PT THER PROC EA 15 MIN 12/21/2023 Sadi Shah, CHANDLER GP 1            PT G-Codes  Outcome Measure Options: AM-PAC 6 Clicks Basic Mobility (PT)  AM-PAC 6 Clicks Score (PT): 16  AM-PAC 6 Clicks Score (OT): 16    Sadi Shah PTA  12/21/2023

## 2023-12-22 PROCEDURE — 97110 THERAPEUTIC EXERCISES: CPT

## 2023-12-22 PROCEDURE — 97116 GAIT TRAINING THERAPY: CPT

## 2023-12-22 PROCEDURE — 97535 SELF CARE MNGMENT TRAINING: CPT

## 2023-12-22 RX ORDER — LABETALOL HYDROCHLORIDE 5 MG/ML
10 INJECTION, SOLUTION INTRAVENOUS EVERY 6 HOURS PRN
Status: DISCONTINUED | OUTPATIENT
Start: 2023-12-22 | End: 2023-12-24 | Stop reason: HOSPADM

## 2023-12-22 RX ADMIN — CYCLOBENZAPRINE 10 MG: 10 TABLET, FILM COATED ORAL at 20:46

## 2023-12-22 RX ADMIN — CYCLOBENZAPRINE 10 MG: 10 TABLET, FILM COATED ORAL at 08:44

## 2023-12-22 RX ADMIN — CARVEDILOL 12.5 MG: 6.25 TABLET, FILM COATED ORAL at 17:56

## 2023-12-22 RX ADMIN — VANCOMYCIN HYDROCHLORIDE 125 MG: 125 CAPSULE ORAL at 02:55

## 2023-12-22 RX ADMIN — FAMOTIDINE 20 MG: 20 TABLET, FILM COATED ORAL at 08:44

## 2023-12-22 RX ADMIN — VANCOMYCIN HYDROCHLORIDE 125 MG: 125 CAPSULE ORAL at 15:19

## 2023-12-22 RX ADMIN — VANCOMYCIN HYDROCHLORIDE 125 MG: 125 CAPSULE ORAL at 08:44

## 2023-12-22 RX ADMIN — QUETIAPINE FUMARATE 25 MG: 25 TABLET, FILM COATED ORAL at 20:46

## 2023-12-22 RX ADMIN — CARVEDILOL 12.5 MG: 6.25 TABLET, FILM COATED ORAL at 08:44

## 2023-12-22 RX ADMIN — VANCOMYCIN HYDROCHLORIDE 125 MG: 125 CAPSULE ORAL at 20:47

## 2023-12-22 RX ADMIN — LEVOTHYROXINE SODIUM 50 MCG: 50 TABLET ORAL at 05:52

## 2023-12-22 RX ADMIN — DONEPEZIL HYDROCHLORIDE 10 MG: 10 TABLET, FILM COATED ORAL at 20:47

## 2023-12-22 RX ADMIN — OXYCODONE HYDROCHLORIDE 15 MG: 15 TABLET, FILM COATED, EXTENDED RELEASE ORAL at 15:19

## 2023-12-22 RX ADMIN — ATORVASTATIN CALCIUM 40 MG: 40 TABLET ORAL at 08:44

## 2023-12-22 RX ADMIN — OXYCODONE HYDROCHLORIDE 15 MG: 15 TABLET, FILM COATED, EXTENDED RELEASE ORAL at 02:55

## 2023-12-22 NOTE — PLAN OF CARE
"Goal Outcome Evaluation:              Outcome Evaluation: NTN LOS assessment. Pt denies food allergies and suspects some weight gain. Admit wt 143.9lb. Pt reported dry chapped lips and mouth \"roughed up\" from chewing and moving food around mouth. Agreeable to soft textures, ground meats, and ice cream all meals. Ordering [employer] vanilla all trays to help with mouth comfort. Pt reported MVI daily prior to admit. No diarrhea/constipation, and pt believes appetite is \"good.\" Average PO 75% of meals, 460 mL oral fluid/day. Menu provided. RN and PCT notified of no room phone seen during visit; would need this to contact FNS team for daily menu selections as needed. NTN following per protocol.         "

## 2023-12-22 NOTE — PLAN OF CARE
Goal Outcome Evaluation:  Plan of Care Reviewed With: patient        Progress: improving  Outcome Evaluation: pt was not eager to participate this date d/t reports of still being tired. Ghazal required <> EOB. pt leans elbows onto RW, education and encouraged upright standing and proper hand placement but pt continued to complete very forward flexed. Pt needed Ghazal for RW positioning. Seated toileting s/u. Pt BTB and s/u for breakfast tray. Recommend SNF for continued rehab

## 2023-12-22 NOTE — THERAPY TREATMENT NOTE
Acute Care - Physical Therapy Treatment Note  [facility]     Patient Name: Jennifer Gomes  : 1940  MRN: [id number]  Today's Date: 2023      Visit Dx:     ICD-10-CM ICD-9-CM   1. Acute renal failure, unspecified acute renal failure type  N17.9 584.9   2. Hypokalemia  E87.6 276.8   3. Urinary tract infection without hematuria, site unspecified  N39.0 599.0   4. Altered mental status, unspecified altered mental status type  R41.82 780.97   5. Impaired mobility [Z74.09]  Z74.09 799.89   6. Impaired mobility and ADLs [Z74.09, Z78.9]  Z74.09 V49.89    Z78.9      Patient Active Problem List   Diagnosis    Gastroesophageal reflux disease    Spinal stenosis, lumbar region, without neurogenic claudication    Overweight    Non-smoker    Erosion of vaginal mesh    Adenocarcinoma of endometrium    Encounter for consultation    S/P hysterectomy with oophorectomy    Encounter for follow-up surveillance of endometrial cancer    History of radiation therapy    Obesity (BMI 30-39.9)    Non-traumatic rhabdomyolysis    Metabolic encephalopathy    Acute renal failure superimposed on stage 3 chronic kidney disease    Acute respiratory failure with hypoxia and hypercapnia    Medical non-compliance, does not take narcotics as prescribed    SIRS (systemic inflammatory response syndrome)    Chronic constipation    Chronic pain syndrome    Chronic prescription opiate use    Anemia    Hypothyroidism (acquired)    Essential hypertension    TOMÁS (acute kidney injury)    Venous insufficiency of both lower extremities    Fluid retention    Low blood pressure reading    Obesity, Class III, BMI 40-49.9 (morbid obesity)    Chronic intractable headache    RAFAL (obstructive sleep apnea)    Change in bowel habits    Acute encephalopathy due to UTI    Toxic metabolic encephalopathy    Polypharmacy    Frequent falls    Grade III internal hemorrhoids    External hemorrhoids    E. coli UTI    Colitis    Moderate malnutrition    Transaminitis     Acute cholecystitis    Acute UTI (urinary tract infection)    Polypharmacy    Altered mental status    UTI (urinary tract infection)    Bacteremia due to Enterococcus    Dementia    Hypokalemia     Past Medical History:   Diagnosis Date    Anxiety     Arthritis     Bronchitis     Cancer     uterine    Chronic pain     Depression     Disease of thyroid gland     Fibromyalgia     GERD (gastroesophageal reflux disease)     Headache     History of transfusion     AS     Hyperlipidemia     Hypertension     Incontinence     Insomnia     Leg pain     Lumbar stenosis     Migraines     Peptic ulcer     Restless legs     Sleep apnea     NO C-PAP    UTI (urinary tract infection)     Vaginal bleeding      Past Surgical History:   Procedure Laterality Date    BLADDER REPAIR      MESH HAD TO BE REMOVED IN 2013    BREAST BIOPSY Right 2017    benign    BREAST CYST EXCISION Left     CARDIAC CATHETERIZATION      CARPAL TUNNEL RELEASE      CATARACT EXTRACTION W/ INTRAOCULAR LENS  IMPLANT, BILATERAL      CHOLECYSTECTOMY WITH INTRAOPERATIVE CHOLANGIOGRAM N/A 2023    Procedure: CHOLECYSTECTOMY LAPAROSCOPIC INTRAOPERATIVE CHOLANGIOGRAM;  Surgeon: Gerardo Arciniega MD;  Location: [facility] OR;  Service: General;  Laterality: N/A;    COLONOSCOPY      COLONOSCOPY N/A 10/01/2021    Procedure: COLONOSCOPY WITH ANESTHESIA;  Surgeon: Tom Velasco DO;  Location: [facility] ENDOSCOPY;  Service: Gastroenterology;  Laterality: N/A;  pre: change in bowel habits  post: diverticulosis. hemorrhoids.   Olivia Mora APRN        CYSTECTOMY      D & C HYSTEROSCOPY N/A 2017    Procedure: DILATATION AND CURETTAGE HYSTEROSCOPY;  Surgeon: Shasta Madrigal MD;  Location: [facility] OR;  Service:     DILATION AND CURETTAGE, DIAGNOSTIC / THERAPEUTIC      ENDOSCOPY  2010    Short segment of Arriola's,Moderate chroninc esophagogastritis and negative H.pylori    ENDOSCOPY N/A 2017    Procedure: ESOPHAGOGASTRODUODENOSCOPY WITH  ANESTHESIA;  Surgeon: Tom Velasco DO;  Location: [facility] ENDOSCOPY;  Service:     EYE SURGERY      RETINA    HEMORRHOIDECTOMY SIGMOIDOSCOPY N/A 3/21/2023    Procedure: HEMORRHOIDECTOMY WITH EXAM UNDER ANESTHESIA;  Surgeon: Holly Chavez MD;  Location: [facility] OR;  Service: General;  Laterality: N/A;    HYSTERECTOMY  12/20/2017    ORIF TIBIA/FIBULA FRACTURES Left 2000    TRANSVAGINAL TAPING SUSPENSION N/A 11/06/2017    Procedure: VAGINAL MESH REVISION;  Surgeon: Shasta Madrigal MD;  Location: [facility] OR;  Service:     VAGINAL MESH REVISION  2013     PT Assessment (last 12 hours)       PT Evaluation and Treatment       Row Name 12/22/23 1128          Physical Therapy Time and Intention    Subjective Information complains of;weakness;fatigue  -     Document Type therapy note (daily note)  -     Mode of Treatment physical therapy  -       Row Name 12/22/23 1128          General Information    Patient/Family/Caregiver Comments/Observations family present  -     Existing Precautions/Restrictions fall  -       Row Name 12/22/23 1128          Pain    Pretreatment Pain Rating 0/10 - no pain  -     Posttreatment Pain Rating 0/10 - no pain  -       Row Name 12/22/23 1128          Bed Mobility    Supine-Sit [place] (Bed Mobility) verbal cues;contact guard  -     Sit-Supine [place] (Bed Mobility) --  sitting EOB  -       Row Name 12/22/23 1128          Sit-Stand Transfer    Sit-Stand [place] (Transfers) verbal cues;minimum assist (75% patient effort)  -     Assistive Device (Sit-Stand Transfers) walker, front-wheeled  -       Row Name 12/22/23 1128          Stand-Sit Transfer    Stand-Sit [place] (Transfers) verbal cues;minimum assist (75% patient effort)  -     Assistive Device (Stand-Sit Transfers) walker, front-wheeled  -       Row Name 12/22/23 1128          Gait/Stairs (Locomotion)    [place] Level (Gait) verbal cues;minimum assist (75% patient effort)  -      Assistive Device (Gait) walker, front-wheeled  -OUSMANE     Distance in Feet (Gait) 15  -OUSMANE     Comment, (Gait/Stairs) pt leans forward with elbows onto the walker  -OUSMANE       Row Name 12/22/23 1128          Motor Skills    Comments, Therapeutic Exercise sitting AROM BLE X 20  -OUSMANE       Row Name 12/22/23 1128          Positioning and Restraints    Pre-Treatment Position in bed  -OUSMANE     Post Treatment Position bed  -OUSMANE     In Bed sitting EOB;call light within reach;encouraged to call for assist;with family/caregiver;side rails up x2  -OUSMANE               User Key  (r) = Recorded By, (t) = Taken By, (c) = Cosigned By      Initials Name Provider Type    OUSMANE Sadi Shah, PTA Physical Therapist Assistant                    Physical Therapy Education       Title: PT OT SLP Therapies (In Progress)       Topic: Physical Therapy (Done)       Point: Mobility training (Done)       Learning Progress Summary             Patient Acceptance, E,D, DU,VU by  at 12/20/2023 1014    Comment: benefits of PT and POC, call for A OOB                         Point: Precautions (Done)       Learning Progress Summary             Patient Acceptance, E,D, DU,VU by  at 12/20/2023 1014    Comment: benefits of PT and POC, call for A OOB                                         User Key       Initials Effective Dates Name Provider Type Discipline     02/03/23 -  Terrell Larkin, PT Physical Therapist PT                  PT Recommendation and Plan     Plan of Care Reviewed With: patient  Progress: improving  Outcome Evaluation: Pt was in bed with family present, agreeable to therapy.  Able to transfer supine to sitting with min assist  with HOB elevated.   Transfered sit to stand with CGA.  Amb 15' with RWX and CGA/min assist, pt ambulates with elbows propped on the walker.  Stated she uses a rollator at home.  Pt would benefit from continued therapy to increase strength and improve safety with all mobility.   Outcome Measures       Row Name 12/22/23  1128 12/21/23 1425          How much help from another person do you currently need...    Turning from your back to your side while in flat bed without using bedrails? 3  -OUSMANE 3  -OUSMANE     Moving from lying on back to sitting on the side of a flat bed without bedrails? 3  -OUSMANE 3  -OUSMANE     Moving to and from a bed to a chair (including a wheelchair)? 3  -OUSMANE 3  -OUSMANE     Standing up from a chair using your arms (e.g., wheelchair, bedside chair)? 3  -OUSMANE 3  -OUSMANE     Climbing 3-5 steps with a railing? 2  -OUSMANE 1  -OUSMANE     To walk in hospital room? 3  -OUSMANE 3  -OUSMANE     AM-PAC 6 Clicks Score (PT) 17  -OUSMANE 16  -OUSMANE     Highest Level of Mobility Goal 5 --> Static standing  -OUSMANE 5 --> Static standing  -OUSMANE        Functional Assessment    Outcome Measure Options AM-PAC 6 Clicks Basic Mobility (PT)  -OUSMANE AM-PAC 6 Clicks Basic Mobility (PT)  -OUSMANE               User Key  (r) = Recorded By, (t) = Taken By, (c) = Cosigned By      Initials Name Provider Type    Sadi Sunshine PTA Physical Therapist Assistant                     Time Calculation:    PT Charges       Row Name 12/22/23 1128             Time Calculation    Start Time 1128  -OUSMANE      Stop Time 1156  -OUSMANE      Time Calculation (min) 28 min  -OUSMANE      PT Received On 12/22/23  -OUSMANE         Time Calculation- PT    Total Timed Code Minutes- PT 28 minute(s)  -OUSMANE         Timed Charges    69365 - PT Therapeutic Exercise Minutes 12  -OUSMANE      64536 - Gait Training Minutes  16  -OUSMANE         Total Minutes    Timed Charges Total Minutes 28  -OUSMANE       Total Minutes 28  -OUSMANE                User Key  (r) = Recorded By, (t) = Taken By, (c) = Cosigned By      Initials Name Provider Type    Sadi Sunshine PTA Physical Therapist Assistant                  Therapy Charges for Today       Code Description Service Date Service Provider Modifiers Qty    60290598515 HC GAIT TRAINING EA 15 MIN 12/21/2023 Sadi Shah PTA GP 1    50273459432 HC PT THER PROC EA 15 MIN 12/21/2023 Sadi Shah PTA GP  1    63131304857 HC GAIT TRAINING EA 15 MIN 12/22/2023 Sadi Shah, PTA GP 1    69963959538 HC PT THER PROC EA 15 MIN 12/22/2023 Sadi Shah, PTA GP 1            PT G-Codes  Outcome Measure Options: AM-PAC 6 Clicks Basic Mobility (PT)  AM-PAC 6 Clicks Score (PT): 17  AM-PAC 6 Clicks Score (OT): 18    Sadi Shah PTA  12/22/2023

## 2023-12-22 NOTE — PLAN OF CARE
Goal Outcome Evaluation:  Plan of Care Reviewed With: patient        Progress: improving  Outcome Evaluation: Pt was in bed with family present, agreeable to therapy.  Able to transfer supine to sitting with min assist  with HOB elevated.   Transfered sit to stand with CGA.  Amb 15' with RWX and CGA/min assist, pt ambulates with elbows propped on the walker.  Stated she uses a rollator at home.  Pt would benefit from continued therapy to increase strength and improve safety with all mobility.

## 2023-12-22 NOTE — THERAPY TREATMENT NOTE
Patient Name: Jennifer Gomes  : 1940    MRN: [id number]                              Today's Date: 2023       Admit Date: 2023    Visit Dx: Therapist utilized gait belt, applied non-slipped socks, provided fall risk education/prevention, & facilitated muscle strengthening PRN to reduce patient falls risk during this session.      ICD-10-CM ICD-9-CM   1. Acute renal failure, unspecified acute renal failure type  N17.9 584.9   2. Hypokalemia  E87.6 276.8   3. Urinary tract infection without hematuria, site unspecified  N39.0 599.0   4. Altered mental status, unspecified altered mental status type  R41.82 780.97   5. Impaired mobility [Z74.09]  Z74.09 799.89   6. Impaired mobility and ADLs [Z74.09, Z78.9]  Z74.09 V49.89    Z78.9      Patient Active Problem List   Diagnosis    Gastroesophageal reflux disease    Spinal stenosis, lumbar region, without neurogenic claudication    Overweight    Non-smoker    Erosion of vaginal mesh    Adenocarcinoma of endometrium    Encounter for consultation    S/P hysterectomy with oophorectomy    Encounter for follow-up surveillance of endometrial cancer    History of radiation therapy    Obesity (BMI 30-39.9)    Non-traumatic rhabdomyolysis    Metabolic encephalopathy    Acute renal failure superimposed on stage 3 chronic kidney disease    Acute respiratory failure with hypoxia and hypercapnia    Medical non-compliance, does not take narcotics as prescribed    SIRS (systemic inflammatory response syndrome)    Chronic constipation    Chronic pain syndrome    Chronic prescription opiate use    Anemia    Hypothyroidism (acquired)    Essential hypertension    TOMÁS (acute kidney injury)    Venous insufficiency of both lower extremities    Fluid retention    Low blood pressure reading    Obesity, Class III, BMI 40-49.9 (morbid obesity)    Chronic intractable headache    RAFAL (obstructive sleep apnea)    Change in bowel habits    Acute encephalopathy due to UTI    Toxic  metabolic encephalopathy    Polypharmacy    Frequent falls    Grade III internal hemorrhoids    External hemorrhoids    E. coli UTI    Colitis    Moderate malnutrition    Transaminitis    Acute cholecystitis    Acute UTI (urinary tract infection)    Polypharmacy    Altered mental status    UTI (urinary tract infection)    Bacteremia due to Enterococcus    Dementia    Hypokalemia     Past Medical History:   Diagnosis Date    Anxiety     Arthritis     Bronchitis     Cancer     uterine    Chronic pain     Depression     Disease of thyroid gland     Fibromyalgia     GERD (gastroesophageal reflux disease)     Headache     History of transfusion     AS     Hyperlipidemia     Hypertension     Incontinence     Insomnia     Leg pain     Lumbar stenosis     Migraines     Peptic ulcer     Restless legs     Sleep apnea     NO C-PAP    UTI (urinary tract infection)     Vaginal bleeding      Past Surgical History:   Procedure Laterality Date    BLADDER REPAIR      MESH HAD TO BE REMOVED IN 2013    BREAST BIOPSY Right 2017    benign    BREAST CYST EXCISION Left     CARDIAC CATHETERIZATION      CARPAL TUNNEL RELEASE      CATARACT EXTRACTION W/ INTRAOCULAR LENS  IMPLANT, BILATERAL      CHOLECYSTECTOMY WITH INTRAOPERATIVE CHOLANGIOGRAM N/A 2023    Procedure: CHOLECYSTECTOMY LAPAROSCOPIC INTRAOPERATIVE CHOLANGIOGRAM;  Surgeon: Gerardo Arciniega MD;  Location: [facility] OR;  Service: General;  Laterality: N/A;    COLONOSCOPY      COLONOSCOPY N/A 10/01/2021    Procedure: COLONOSCOPY WITH ANESTHESIA;  Surgeon: Tom Velasco DO;  Location: [facility] ENDOSCOPY;  Service: Gastroenterology;  Laterality: N/A;  pre: change in bowel habits  post: diverticulosis. hemorrhoids.   Olivia Mora APRN        CYSTECTOMY      D & C HYSTEROSCOPY N/A 2017    Procedure: DILATATION AND CURETTAGE HYSTEROSCOPY;  Surgeon: Shasta Madrigal MD;  Location: [facility] OR;  Service:     DILATION AND CURETTAGE, DIAGNOSTIC / THERAPEUTIC   2008    ENDOSCOPY  09/23/2010    Short segment of Arriola's,Moderate chroninc esophagogastritis and negative H.pylori    ENDOSCOPY N/A 09/25/2017    Procedure: ESOPHAGOGASTRODUODENOSCOPY WITH ANESTHESIA;  Surgeon: Tom Velasco DO;  Location: [facility] ENDOSCOPY;  Service:     EYE SURGERY      RETINA    HEMORRHOIDECTOMY SIGMOIDOSCOPY N/A 3/21/2023    Procedure: HEMORRHOIDECTOMY WITH EXAM UNDER ANESTHESIA;  Surgeon: Holly Chavez MD;  Location: [facility] OR;  Service: General;  Laterality: N/A;    HYSTERECTOMY  12/20/2017    ORIF TIBIA/FIBULA FRACTURES Left 2000    TRANSVAGINAL TAPING SUSPENSION N/A 11/06/2017    Procedure: VAGINAL MESH REVISION;  Surgeon: Shasta Madrigal MD;  Location: [facility] OR;  Service:     VAGINAL MESH REVISION  2013      General Information       Row Name 12/22/23 0817          OT Time and Intention    Document Type therapy note (daily note)  -MT     Mode of Treatment occupational therapy  -MT       Row Name 12/22/23 0817          General Information    Patient Profile Reviewed yes  -MT     Existing Precautions/Restrictions fall  -MT       Row Name 12/22/23 0817          Safety Issues, Functional Mobility    Impairments Affecting Function (Mobility) balance;cognition;endurance/activity tolerance;postural/trunk control;strength  -MT               User Key  (r) = Recorded By, (t) = Taken By, (c) = Cosigned By      Initials Name Provider Type    MT Sandra Jolley COTA Occupational Therapist Assistant                     Mobility/ADL's       Row Name 12/22/23 0817          Bed Mobility    Supine-Sit [place] (Bed Mobility) minimum assist (75% patient effort)  -MT     Sit-Supine [place] (Bed Mobility) minimum assist (75% patient effort)  -MT     Comment, (Bed Mobility) pt was not eager to participate this date d/t reports of still being tired. Ghazal required <> EOB  -MT       Row Name 12/22/23 0817          Transfers    Transfers sit-stand transfer;stand-sit transfer;toilet transfer   -MT       Row Name 12/22/23 0817          Sit-Stand Transfer    Sit-Stand [place] (Transfers) verbal cues;minimum assist (75% patient effort)  -MT     Assistive Device (Sit-Stand Transfers) walker, front-wheeled  -MT       Row Name 12/22/23 0817          Stand-Sit Transfer    Stand-Sit [place] (Transfers) verbal cues;minimum assist (75% patient effort)  -MT     Assistive Device (Stand-Sit Transfers) walker, front-wheeled  -MT       Row Name 12/22/23 0817          Toilet Transfer    Type (Toilet Transfer) sit-stand;stand-sit  -MT       Row Name 12/22/23 0817          Functional Mobility    Functional Mobility- Ind. Level contact guard assist;minimum assist (75% patient effort);verbal cues required  -MT     Functional Mobility- Device walker, front-wheeled  -MT     Functional Mobility- Comment pt leans elbows onto RW, education and encouraged upright standing and proper hand placement  -MT               User Key  (r) = Recorded By, (t) = Taken By, (c) = Cosigned By      Initials Name Provider Type    Sandra Potter COTA Occupational Therapist Assistant                   Obj/Interventions    No documentation.                  Goals/Plan    No documentation.                  Clinical Impression       Row Name 12/22/23 0817          Pain Assessment    Pretreatment Pain Rating 0/10 - no pain  -MT     Posttreatment Pain Rating 0/10 - no pain  -MT       [facility] Name 12/22/23 0817          Therapy Plan Review/Discharge Plan (OT)    Anticipated Discharge Disposition (OT) skilled nursing facility  -MT       Row Name 12/22/23 0817          Positioning and Restraints    Pre-Treatment Position in bed  -MT     Post Treatment Position bed  -MT     In Bed fowlers;call light within reach;encouraged to call for assist;exit alarm on;side rails up x2  -MT               User Key  (r) = Recorded By, (t) = Taken By, (c) = Cosigned By      Initials Name Provider Type    Sandra Potter COTA Occupational Therapist Assistant                    Outcome Measures       Row Name 12/22/23 0817          How much help from another is currently needed...    Putting on and taking off regular lower body clothing? 3  -MT     Bathing (including washing, rinsing, and drying) 3  -MT     Toileting (which includes using toilet bed pan or urinal) 3  -MT     Putting on and taking off regular upper body clothing 3  -MT     Taking care of personal grooming (such as brushing teeth) 3  -MT     Eating meals 3  -MT     AM-PAC 6 Clicks Score (OT) 18  -MT       Row Name 12/22/23 0900          How much help from another person do you currently need...    Turning from your back to your side while in flat bed without using bedrails? 4  -MR     Moving from lying on back to sitting on the side of a flat bed without bedrails? 4  -MR     Moving to and from a bed to a chair (including a wheelchair)? 3  -MR     Standing up from a chair using your arms (e.g., wheelchair, bedside chair)? 3  -MR     Climbing 3-5 steps with a railing? 2  -MR     To walk in hospital room? 3  -MR     AM-PAC 6 Clicks Score (PT) 19  -MR     Highest Level of Mobility Goal 6 --> Walk 10 steps or more  -MR               User Key  (r) = Recorded By, (t) = Taken By, (c) = Cosigned By      Initials Name Provider Type    MT Sandra Jolley COTA Occupational Therapist Assistant    MR Carolyn James, RN Registered Nurse                    Occupational Therapy Education       Title: PT OT SLP Therapies (In Progress)       Topic: Occupational Therapy (In Progress)       Point: ADL training (In Progress)       Description:   Instruct learner(s) on proper safety adaptation and remediation techniques during self care or transfers.   Instruct in proper use of assistive devices.                  Learning Progress Summary             Patient Acceptance, E, NR by MM at 12/20/2023 7544                         Point: Home exercise program (Not Started)       Description:   Instruct learner(s) on appropriate technique  for monitoring, assisting and/or progressing therapeutic exercises/activities.                  Learner Progress:  Not documented in this visit.              Point: Precautions (In Progress)       Description:   Instruct learner(s) on prescribed precautions during self-care and functional transfers.                  Learning Progress Summary             Patient Acceptance, E, NR by  at 12/20/2023 1549                         Point: Body mechanics (In Progress)       Description:   Instruct learner(s) on proper positioning and spine alignment during self-care, functional mobility activities and/or exercises.                  Learning Progress Summary             Patient Acceptance, E, NR by MM at 12/20/2023 1549                                         User Key       Initials Effective Dates Name Provider Type Discipline     07/11/23 -  Dontae Metzger E, OTR/L Occupational Therapist OT                  OT Recommendation and Plan     Plan of Care Review  Plan of Care Reviewed With: patient  Progress: improving  Outcome Evaluation: pt was not eager to participate this date d/t reports of still being tired. Ghzaal required <> EOB. pt leans elbows onto RW, education and encouraged upright standing and proper hand placement but pt continued to complete very forward flexed. Pt needed Ghazal for RW positioning. Seated toileting s/u. Pt BTB and s/u for breakfast tray. Recommend SNF for continued rehab     Time Calculation:         Time Calculation- OT       Row Name 12/22/23 0817             Time Calculation- OT    OT Start Time 0817  -MT      OT Stop Time 0841  -MT      OT Time Calculation (min) 24 min  -MT      Total Timed Code Minutes- OT 24 minute(s)  -MT      OT Received On 12/22/23  -MT         Timed Charges    09024 - OT Self Care/Mgmt Minutes 24  -MT         Total Minutes    Timed Charges Total Minutes 24  -MT       Total Minutes 24  -MT                User Key  (r) = Recorded By, (t) = Taken By, (c) = Cosigned By       Initials Name Provider Type    MT Sandra Jolley COTA Occupational Therapist Assistant                  Therapy Charges for Today       Code Description Service Date Service Provider Modifiers Qty    82722756096 HC OT SELF CARE/MGMT/TRAIN EA 15 MIN 12/22/2023 Sandra Jolley COTA GO 2                 ZAKIA Garibay  12/22/2023

## 2023-12-22 NOTE — CASE MANAGEMENT/SOCIAL WORK
Continued Stay Note   [place]     Patient Name: Jennifer Gomes  MRN: [id number]  Today's Date: 12/22/2023    Admit Date: 12/18/2023    Plan: SNF   Discharge Plan       Row Name 12/22/23 0929       Plan    Plan SNF    Patient/Family in Agreement with Plan yes    Plan Comments Awaiting responses from [facility], [facility], and [facility].  [facility] is following due to behaviors/sitter.    10:29-  Pt's sitter was dc yesterday at 14:00.                   Discharge Codes    No documentation.                 Expected Discharge Date and Time       Expected Discharge Date Expected Discharge Time    Dec 21, 2023               JEFRY Devi

## 2023-12-22 NOTE — PLAN OF CARE
Problem: Skin Injury Risk Increased  Goal: Skin Health and Integrity  Outcome: Ongoing, Progressing  Intervention: Optimize Skin Protection  Recent Flowsheet Documentation  Taken 12/22/2023 0400 by My Grey RN  Head of Bed (HOB) Positioning:   HOB at 20-30 degrees   HOB at 30 degrees  Taken 12/22/2023 0325 by My Grey RN  Head of Bed (HOB) Positioning: HOB at 20 degrees  Taken 12/22/2023 0255 by My Grey RN  Head of Bed (HOB) Positioning: HOB at 20-30 degrees  Taken 12/22/2023 0000 by My Grey RN  Head of Bed (HOB) Positioning: HOB at 20-30 degrees  Taken 12/21/2023 2154 by My Grey RN  Head of Bed (HOB) Positioning: HOB at 30 degrees   Goal Outcome Evaluation:  Plan of Care Reviewed With: patient        Progress: improving  Outcome Evaluation: Patient has done well tonight.  She has had not verbal outbursts and did not try to get out of bed by herself.  Bedcheck is in place.  Transfer with one assist.  Patient slept all night tonight.  Bed check has remained in the alarm position.  Referrals have been sent out to [facility], [facility], [facility], and [facility].  She did get her Seroquel last night and her every 12 hour Oxycontin.  Cont. to monitor.  Call  for concerns.  S [phone]

## 2023-12-22 NOTE — PROGRESS NOTES
[facility] Medicine Services  INPATIENT PROGRESS NOTE    Patient Name: Jennifer Gomes  Date of Admission: 12/18/2023  Today's Date: 12/22/23  Length of Stay: 4  Primary Care Physician: Olivia Mora APRN    Subjective   Chief Complaint: follow up confusion  HPI   She was sitting up in bed.  She rested well last night per RN.  She denies any acute complaints.    Plan of care discussed with her daughter Ivonne via telephone on 12/21.  Patient lives at home with spouse and daughter.  Normally she can walk short distances at a slow pace with rollator.  Daughter states she has a history of dementia and takes Aricept.  She saw patient yesterday and states that mental status is close to baseline.  Discussed with her daughter that therapy is commending skilled nursing facility placement.  Daughter is in agreement.  Referral sent to [facility], [facility], [facility].  Patient's insurance will require pre-CERT.    Review of Systems   All pertinent negatives and positives are as above. All other systems have been reviewed and are negative unless otherwise stated.     Objective    Temp:  [96 °F (35.6 °C)-97.9 °F (36.6 °C)] 96.2 °F (35.7 °C)  Heart Rate:  [] 70  Resp:  [16-18] 16  BP: (133-170)/(60-83) 146/68  Physical Exam  Vitals reviewed.   Constitutional:       General: She is not in acute distress.     Appearance: She is not toxic-appearing.      Comments: Lying in bed.  No acute distress.  On room air.  No family at bedside.  Discussed with her nurse liam   HENT:      Head: Normocephalic and atraumatic.      Mouth/Throat:      Mouth: Mucous membranes are moist.      Pharynx: Oropharynx is clear.   Eyes:      Extraocular Movements: Extraocular movements intact.      Conjunctiva/sclera: Conjunctivae normal.      Pupils: Pupils are equal, round, and reactive to light.   Cardiovascular:      Rate and Rhythm: Normal rate and regular rhythm.      Pulses:  Normal pulses.   Pulmonary:      Effort: Pulmonary effort is normal. No respiratory distress.      Breath sounds: Normal breath sounds. No wheezing or rhonchi.   Abdominal:      General: Bowel sounds are normal. There is no distension.      Palpations: Abdomen is soft.      Tenderness: There is no abdominal tenderness.   Musculoskeletal:         General: No swelling or tenderness. Normal range of motion.      Cervical back: Normal range of motion and neck supple. No muscular tenderness.   Skin:     General: Skin is warm and dry.      Findings: No erythema or rash.   Neurological:      General: No focal deficit present.      Mental Status: She is alert. Mental status is at baseline.      Cranial Nerves: No cranial nerve deficit.      Motor: No weakness.   Psychiatric:         Mood and Affect: Mood normal.         Behavior: Behavior normal.       Results Review:  I have reviewed the labs, radiology results, and diagnostic studies.    Laboratory Data:   Results from last 7 days   Lab Units 12/21/23  0356 12/20/23  0529 12/19/23  0809   WBC 10*3/mm3 4.98 8.07 11.02*   HEMOGLOBIN g/dL 9.2* 8.3* 7.5*   HEMATOCRIT % 32.3* 27.1* 24.1*   PLATELETS 10*3/mm3 172 179 172        Results from last 7 days   Lab Units 12/21/23  0817 12/21/23  0356 12/20/23  1548 12/20/23  0529 12/19/23  1819 12/19/23  0809 12/18/23  1311   SODIUM mmol/L  --  140  --  143  --  143 140   POTASSIUM mmol/L 4.3 3.7 3.2* 2.9*   < > 1.9* 2.2*   CHLORIDE mmol/L  --  112*  --  113*  --  111* 104   CO2 mmol/L  --  15.0*  --  16.0*  --  15.0* 11.0*   BUN mg/dL  --  21  --  29*  --  41* 41*   CREATININE mg/dL  --  0.97  --  1.06*  --  1.52* 2.14*   CALCIUM mg/dL  --  9.2  --  9.0  --  7.9* 7.7*   BILIRUBIN mg/dL  --   --   --   --   --   --  <0.2   ALK PHOS U/L  --   --   --   --   --   --  76   ALT (SGPT) U/L  --   --   --   --   --   --  <5   AST (SGOT) U/L  --   --   --   --   --   --  10   GLUCOSE mg/dL  --  125*  --  100*  --  123* 104*    < > = values in  "this interval not displayed.       Culture Data:   No results found for: \"ACANTHNAEG\", \"AFBCX\", \"BPERTUSSISCX\", \"BLOODCX\"  No results found for: \"BCIDPCR\", \"CXREFLEX\", \"CSFCX\", \"CULTURETIS\"  No results found for: \"CULTURES\", \"HSVCX\", \"URCX\"  No results found for: \"EYECULTURE\", \"GCCX\", \"HSVCULTURE\", \"LABHSV\"  No results found for: \"LEGIONELLA\", \"MRSACX\", \"MUMPSCX\", \"MYCOPLASCX\"  No results found for: \"NOCARDIACX\", \"STOOLCX\"  No results found for: \"THROATCX\", \"UNSTIMCULT\", \"URINECX\", \"CULTURE\", \"VZVCULTUR\"  No results found for: \"VIRALCULTU\", \"WOUNDCX\"    Radiology Data:   Imaging Results (Last 24 Hours)       ** No results found for the last 24 hours. **            I have reviewed the patient's current medications.     Assessment/Plan   Assessment  Active Hospital Problems    Diagnosis     **Hypokalemia     UTI (urinary tract infection)     Toxic metabolic encephalopathy     Acute renal failure superimposed on stage 3 chronic kidney disease      Ms. Gomes is an 83-year-old female who presented to [facility] on 12/18 for worsening confusion and fall.  She is on Aricept but there is no diagnosis of dementia and patient and her  deny this.  Of note, she was admitted to our hospital in November 2023 for acute urinary tract infection with bacteremia due to Enterococcus.  She was discharged home on Augmentin.  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  Reportedly patient fell so she was brought to the emergency department.  She has had poor oral intake and may have had some diarrhea.  In the ED, urinalysis suggestive of urinary tract infection.  Creatinine elevated at 2.14 consistent with acute kidney injury as well as hypokalemia with potassium 2.2.  CT head showed no acute findings.  Pelvis x-ray showed no acute findings.  Chest x-ray showed no acute findings.  1 L fluid bolus given in ED.    Treatment Plan  Acute urinary tract infection.  Urine culture in November 2023 showed E. coli only resistant " to Levaquin.  Blood culture with no growth to date.  Urine culture showed mixed irina.  She completed 3-day course of antibiotics on 12/20.    Hypokalemia now resolved.    Acute kidney injury with creatinine 2.14.  Creatinine on 11/8/2023 was 1.6.  Gentle IV fluid.  Creatinine today improved at 0.97.  Encourage p.o. intake.    Reportedly had diarrhea prior to admission.  She recently completed antibiotic treatment with Augmentin.  C. difficile PCR is positive, C. Difficile toxin antigen is negative.  Afebrile and WBC has improved from 22 down to 8.  No abdominal complaints.  Continue oral vancomycin.    Hemoglobin 10.1 on admission.  She has received IV fluid.  No signs of bleeding.  During last hospitalization hemoglobin was down to 6.5.  Patient refused blood transfusion.  Hemoglobin was 7.3 at time of discharge.  Reviewed prior records of colonoscopy and EGD.  She has been found with diverticulosis.  She also had shown ulcer with no stigmata of bleeding on EGD. Patient was started on Retacrit and appointment made for patient to see hematology on 1/10/2024.  Hemoglobin 9.2.    SCDs for DVT prophylaxis.    PT/OT.  Therapy recommends SNF.  Daughter is in agreement.  Referrals to [facility], [facility], [facility] and [facility].    Medical Decision Making  Number and Complexity of problems: 5 acute problems in the form of acute urinary tract infection, hypokalemia, acute kidney injury, c. Difficile, and nomocytic anemia  Differential Diagnosis: None considered at present    Conditions and Status        Condition is unchanged.     [facility] Data  External documents reviewed: Prior [facility] records  Cardiac tracing (EKG, telemetry) interpretation: Reviewed  Radiology interpretation: Interpreted by radiology  Labs reviewed: As above  Any tests that were considered but not ordered: None considered at present     Decision rules/scores evaluated (example [id number], Wells, etc): None considered at present     Discussed with:  Patient and Dr. Tirado     Care Planning  Shared decision making: Patient is agreeable to ongoing workup and treatment  Code status and discussions: Full code with full interventions    Disposition  Social Determinants of Health that impact treatment or disposition: none  I expect the patient to be discharged to SNF in 2-3 days.     Electronically signed by MORGAN Ho, 12/22/23, 13:12 CST.

## 2023-12-23 LAB
ANION GAP SERPL CALCULATED.3IONS-SCNC: 6 MMOL/L (ref 5–15)
BACTERIA SPEC AEROBE CULT: NORMAL
BACTERIA SPEC AEROBE CULT: NORMAL
BUN SERPL-MCNC: 17 MG/DL (ref 8–23)
BUN/CREAT SERPL: 24.3 (ref 7–25)
CALCIUM SPEC-SCNC: 9.3 MG/DL (ref 8.6–10.5)
CHLORIDE SERPL-SCNC: 111 MMOL/L (ref 98–107)
CO2 SERPL-SCNC: 23 MMOL/L (ref 22–29)
CREAT SERPL-MCNC: 0.7 MG/DL (ref 0.57–1)
EGFRCR SERPLBLD CKD-EPI 2021: 87 ML/MIN/1.73
GLUCOSE SERPL-MCNC: 99 MG/DL (ref 65–99)
POTASSIUM SERPL-SCNC: 4.2 MMOL/L (ref 3.5–5.2)
SODIUM SERPL-SCNC: 140 MMOL/L (ref 136–145)

## 2023-12-23 PROCEDURE — 97116 GAIT TRAINING THERAPY: CPT

## 2023-12-23 PROCEDURE — 97110 THERAPEUTIC EXERCISES: CPT

## 2023-12-23 PROCEDURE — 80048 BASIC METABOLIC PNL TOTAL CA: CPT | Performed by: NURSE PRACTITIONER

## 2023-12-23 RX ADMIN — ACETAMINOPHEN 650 MG: 325 TABLET, FILM COATED ORAL at 15:02

## 2023-12-23 RX ADMIN — VANCOMYCIN HYDROCHLORIDE 125 MG: 125 CAPSULE ORAL at 20:20

## 2023-12-23 RX ADMIN — CYCLOBENZAPRINE 10 MG: 10 TABLET, FILM COATED ORAL at 20:19

## 2023-12-23 RX ADMIN — CARVEDILOL 12.5 MG: 6.25 TABLET, FILM COATED ORAL at 17:16

## 2023-12-23 RX ADMIN — DONEPEZIL HYDROCHLORIDE 10 MG: 10 TABLET, FILM COATED ORAL at 20:20

## 2023-12-23 RX ADMIN — OXYCODONE HYDROCHLORIDE 15 MG: 15 TABLET, FILM COATED, EXTENDED RELEASE ORAL at 10:08

## 2023-12-23 RX ADMIN — CYCLOBENZAPRINE 10 MG: 10 TABLET, FILM COATED ORAL at 10:00

## 2023-12-23 RX ADMIN — CARVEDILOL 12.5 MG: 6.25 TABLET, FILM COATED ORAL at 10:00

## 2023-12-23 RX ADMIN — OXYCODONE HYDROCHLORIDE 15 MG: 15 TABLET, FILM COATED, EXTENDED RELEASE ORAL at 22:30

## 2023-12-23 RX ADMIN — LEVOTHYROXINE SODIUM 50 MCG: 50 TABLET ORAL at 05:34

## 2023-12-23 RX ADMIN — VANCOMYCIN HYDROCHLORIDE 125 MG: 125 CAPSULE ORAL at 02:09

## 2023-12-23 RX ADMIN — FAMOTIDINE 20 MG: 20 TABLET, FILM COATED ORAL at 10:00

## 2023-12-23 RX ADMIN — VANCOMYCIN HYDROCHLORIDE 125 MG: 125 CAPSULE ORAL at 15:01

## 2023-12-23 RX ADMIN — VANCOMYCIN HYDROCHLORIDE 125 MG: 125 CAPSULE ORAL at 10:01

## 2023-12-23 RX ADMIN — ATORVASTATIN CALCIUM 40 MG: 40 TABLET ORAL at 10:00

## 2023-12-23 RX ADMIN — QUETIAPINE FUMARATE 25 MG: 25 TABLET, FILM COATED ORAL at 20:19

## 2023-12-23 NOTE — PLAN OF CARE
Goal Outcome Evaluation:   Patient performed supine to sit to supine with CGA. Actively worked thru multiple LE ex's and core trunk activities. Stood x 4 with CGA. Ambulated 18' in room using a Rwx by supporting self thru forearms and trunk fully flexed leaning on wx. Patient will benefit from strengthening activities.

## 2023-12-23 NOTE — PROGRESS NOTES
[facility] Medicine Services  INPATIENT PROGRESS NOTE    Patient Name: Jennifer Gomes  Date of Admission: 12/18/2023  Today's Date: 12/23/23  Length of Stay: 5  Primary Care Physician: Olivia Mora APRN    Subjective   Chief Complaint: follow up confusion  HPI   Patient examined sitting up in bed on room air in no acute distress.  We we discussed her discharge plan.  Patient is adamant she will only discharge home and does not plan on going to a rehab facility.  I discussed this plan with her daughter, Lola, which she lives with.  Lola is agreeable for her to discharge home and is there to assist her.  Tentative plan for discharge home tomorrow after allowing time for family to prepare the home.    Review of Systems   All pertinent negatives and positives are as above. All other systems have been reviewed and are negative unless otherwise stated.     Objective    Temp:  [95.8 °F (35.4 °C)-97.8 °F (36.6 °C)] 97.7 °F (36.5 °C)  Heart Rate:  [68-89] 68  Resp:  [16-18] 16  BP: (109-160)/(40-78) 109/40  Physical Exam  Vitals reviewed.   Constitutional:       General: She is not in acute distress.     Appearance: She is not toxic-appearing.      Comments: Up in bed, room air, no acute distress, no visitors at bedside   HENT:      Head: Normocephalic and atraumatic.      Mouth/Throat:      Mouth: Mucous membranes are moist.      Pharynx: Oropharynx is clear.   Eyes:      Extraocular Movements: Extraocular movements intact.      Conjunctiva/sclera: Conjunctivae normal.      Pupils: Pupils are equal, round, and reactive to light.   Cardiovascular:      Rate and Rhythm: Normal rate and regular rhythm.      Pulses: Normal pulses.   Pulmonary:      Effort: Pulmonary effort is normal. No respiratory distress.      Breath sounds: Normal breath sounds. No wheezing or rhonchi.   Abdominal:      General: Bowel sounds are normal. There is no distension.      Palpations: Abdomen is soft.  "     Tenderness: There is no abdominal tenderness.   Musculoskeletal:         General: No swelling or tenderness. Normal range of motion.      Cervical back: Normal range of motion and neck supple. No muscular tenderness.   Skin:     General: Skin is warm and dry.      Findings: No erythema or rash.   Neurological:      General: No focal deficit present.      Mental Status: She is alert. Mental status is at baseline.      Cranial Nerves: No cranial nerve deficit.      Motor: No weakness.   Psychiatric:         Mood and Affect: Mood normal.         Behavior: Behavior normal.       Results Review:  I have reviewed the labs, radiology results, and diagnostic studies.    Laboratory Data:   Results from last 7 days   Lab Units 12/21/23  0356 12/20/23  0529 12/19/23  0809   WBC 10*3/mm3 4.98 8.07 11.02*   HEMOGLOBIN g/dL 9.2* 8.3* 7.5*   HEMATOCRIT % 32.3* 27.1* 24.1*   PLATELETS 10*3/mm3 172 179 172        Results from last 7 days   Lab Units 12/23/23  0506 12/21/23  0817 12/21/23  0356 12/20/23  1548 12/20/23  0529 12/19/23  0809 12/18/23  1311   SODIUM mmol/L 140  --  140  --  143   < > 140   POTASSIUM mmol/L 4.2 4.3 3.7   < > 2.9*   < > 2.2*   CHLORIDE mmol/L 111*  --  112*  --  113*   < > 104   CO2 mmol/L 23.0  --  15.0*  --  16.0*   < > 11.0*   BUN mg/dL 17  --  21  --  29*   < > 41*   CREATININE mg/dL 0.70  --  0.97  --  1.06*   < > 2.14*   CALCIUM mg/dL 9.3  --  9.2  --  9.0   < > 7.7*   BILIRUBIN mg/dL  --   --   --   --   --   --  <0.2   ALK PHOS U/L  --   --   --   --   --   --  76   ALT (SGPT) U/L  --   --   --   --   --   --  <5   AST (SGOT) U/L  --   --   --   --   --   --  10   GLUCOSE mg/dL 99  --  125*  --  100*   < > 104*    < > = values in this interval not displayed.       Culture Data:   No results found for: \"ACANTHNAEG\", \"AFBCX\", \"BPERTUSSISCX\", \"BLOODCX\"  No results found for: \"BCIDPCR\", \"CXREFLEX\", \"CSFCX\", \"CULTURETIS\"  No results found for: \"CULTURES\", \"HSVCX\", \"URCX\"  No results found for: " "\"EYECULTURE\", \"GCCX\", \"HSVCULTURE\", \"LABHSV\"  No results found for: \"LEGIONELLA\", \"MRSACX\", \"MUMPSCX\", \"MYCOPLASCX\"  No results found for: \"NOCARDIACX\", \"STOOLCX\"  No results found for: \"THROATCX\", \"UNSTIMCULT\", \"URINECX\", \"CULTURE\", \"VZVCULTUR\"  No results found for: \"VIRALCULTU\", \"WOUNDCX\"    Radiology Data:   Imaging Results (Last 24 Hours)       ** No results found for the last 24 hours. **            I have reviewed the patient's current medications.     Assessment/Plan   Assessment  Active Hospital Problems    Diagnosis     **Hypokalemia     UTI (urinary tract infection)     Toxic metabolic encephalopathy     Acute renal failure superimposed on stage 3 chronic kidney disease      Ms. Gomes is an 83-year-old female who presented to [facility] on 12/18 for worsening confusion and fall.  She is on Aricept but there is no diagnosis of dementia and patient and her  deny this.  Of note, she was admitted to our hospital in November 2023 for acute urinary tract infection with bacteremia due to Enterococcus.  She was discharged home on Augmentin.  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  Reportedly patient fell so she was brought to the emergency department.  She has had poor oral intake and may have had some diarrhea.  In the ED, urinalysis suggestive of urinary tract infection.  Creatinine elevated at 2.14 consistent with acute kidney injury as well as hypokalemia with potassium 2.2.  CT head showed no acute findings.  Pelvis x-ray showed no acute findings.  Chest x-ray showed no acute findings.  1 L fluid bolus given in ED.    Treatment Plan  Acute urinary tract infection.  Urine culture in November 2023 showed E. coli only resistant to Levaquin.  Blood culture with no growth to date.  Urine culture showed mixed irina.  She completed 3-day course of antibiotics on 12/20.    Hypokalemia now resolved.    Acute kidney injury with creatinine 2.14.  Creatinine on 11/8/2023 was 1.6.  Creatinine " today 0.7.    Reportedly had diarrhea prior to admission.  She recently completed antibiotic treatment with Augmentin.  C. difficile PCR is positive, C. Difficile toxin antigen is negative.  Afebrile and WBC has improved from 22 down to 8.  No abdominal complaints.  Continue oral vancomycin.    Hemoglobin 10.1 on admission.  She has received IV fluid.  No signs of bleeding.  During last hospitalization hemoglobin was down to 6.5.  Patient refused blood transfusion.  Hemoglobin was 7.3 at time of discharge.  Reviewed prior records of colonoscopy and EGD.  She has been found with diverticulosis.  She also had shown ulcer with no stigmata of bleeding on EGD. Patient was started on Retacrit and appointment made for patient to see hematology on 1/10/2024.  Hemoglobin 9.2 on 10/22, no signs or symptoms of bleeding.    SCDs for DVT prophylaxis.    PT/OT.  Therapy recommends SNF.  Daughter is in agreement.  Referrals to [facility], [facility], [facility] and [facility].    Medical Decision Making  Number and Complexity of problems: 5 acute problems in the form of acute urinary tract infection, hypokalemia, acute kidney injury, c. Difficile, and nomocytic anemia  Differential Diagnosis: None considered at present    Conditions and Status        Condition is unchanged.     [facility] Data  External documents reviewed: Prior [facility] records  Cardiac tracing (EKG, telemetry) interpretation: Reviewed  Radiology interpretation: Interpreted by radiology  Labs reviewed: As above  Any tests that were considered but not ordered: None considered at present     Decision rules/scores evaluated (example [id number], Wells, etc): None considered at present     Discussed with: Patient and Dr. Tirado     Care Planning  Shared decision making: Patient is agreeable to ongoing workup and treatment  Code status and discussions: Full code with full interventions    Disposition  Social Determinants of Health that impact treatment or disposition:  none  I expect the patient to be discharged to home tomorrow    Electronically signed by MORGAN Shields, 12/23/23, 12:39 CST.

## 2023-12-23 NOTE — THERAPY TREATMENT NOTE
Acute Care - Physical Therapy Treatment Note  [facility]     Patient Name: Jennifer Gomes  : 1940  MRN: [id number]  Today's Date: 2023      Visit Dx:     ICD-10-CM ICD-9-CM   1. Acute renal failure, unspecified acute renal failure type  N17.9 584.9   2. Hypokalemia  E87.6 276.8   3. Urinary tract infection without hematuria, site unspecified  N39.0 599.0   4. Altered mental status, unspecified altered mental status type  R41.82 780.97   5. Impaired mobility [Z74.09]  Z74.09 799.89   6. Impaired mobility and ADLs [Z74.09, Z78.9]  Z74.09 V49.89    Z78.9      Patient Active Problem List   Diagnosis    Gastroesophageal reflux disease    Spinal stenosis, lumbar region, without neurogenic claudication    Overweight    Non-smoker    Erosion of vaginal mesh    Adenocarcinoma of endometrium    Encounter for consultation    S/P hysterectomy with oophorectomy    Encounter for follow-up surveillance of endometrial cancer    History of radiation therapy    Obesity (BMI 30-39.9)    Non-traumatic rhabdomyolysis    Metabolic encephalopathy    Acute renal failure superimposed on stage 3 chronic kidney disease    Acute respiratory failure with hypoxia and hypercapnia    Medical non-compliance, does not take narcotics as prescribed    SIRS (systemic inflammatory response syndrome)    Chronic constipation    Chronic pain syndrome    Chronic prescription opiate use    Anemia    Hypothyroidism (acquired)    Essential hypertension    TOMÁS (acute kidney injury)    Venous insufficiency of both lower extremities    Fluid retention    Low blood pressure reading    Obesity, Class III, BMI 40-49.9 (morbid obesity)    Chronic intractable headache    RAFAL (obstructive sleep apnea)    Change in bowel habits    Acute encephalopathy due to UTI    Toxic metabolic encephalopathy    Polypharmacy    Frequent falls    Grade III internal hemorrhoids    External hemorrhoids    E. coli UTI    Colitis    Moderate malnutrition    Transaminitis     Acute cholecystitis    Acute UTI (urinary tract infection)    Polypharmacy    Altered mental status    UTI (urinary tract infection)    Bacteremia due to Enterococcus    Dementia    Hypokalemia     Past Medical History:   Diagnosis Date    Anxiety     Arthritis     Bronchitis     Cancer     uterine    Chronic pain     Depression     Disease of thyroid gland     Fibromyalgia     GERD (gastroesophageal reflux disease)     Headache     History of transfusion     AS     Hyperlipidemia     Hypertension     Incontinence     Insomnia     Leg pain     Lumbar stenosis     Migraines     Peptic ulcer     Restless legs     Sleep apnea     NO C-PAP    UTI (urinary tract infection)     Vaginal bleeding      Past Surgical History:   Procedure Laterality Date    BLADDER REPAIR      MESH HAD TO BE REMOVED IN 2013    BREAST BIOPSY Right 2017    benign    BREAST CYST EXCISION Left     CARDIAC CATHETERIZATION      CARPAL TUNNEL RELEASE      CATARACT EXTRACTION W/ INTRAOCULAR LENS  IMPLANT, BILATERAL      CHOLECYSTECTOMY WITH INTRAOPERATIVE CHOLANGIOGRAM N/A 2023    Procedure: CHOLECYSTECTOMY LAPAROSCOPIC INTRAOPERATIVE CHOLANGIOGRAM;  Surgeon: Gerardo Arciniega MD;  Location: [facility] OR;  Service: General;  Laterality: N/A;    COLONOSCOPY      COLONOSCOPY N/A 10/01/2021    Procedure: COLONOSCOPY WITH ANESTHESIA;  Surgeon: Tom Velasco DO;  Location: [facility] ENDOSCOPY;  Service: Gastroenterology;  Laterality: N/A;  pre: change in bowel habits  post: diverticulosis. hemorrhoids.   Olivia Mora APRN        CYSTECTOMY      D & C HYSTEROSCOPY N/A 2017    Procedure: DILATATION AND CURETTAGE HYSTEROSCOPY;  Surgeon: Shasta Madrigal MD;  Location: [facility] OR;  Service:     DILATION AND CURETTAGE, DIAGNOSTIC / THERAPEUTIC      ENDOSCOPY  2010    Short segment of Arriola's,Moderate chroninc esophagogastritis and negative H.pylori    ENDOSCOPY N/A 2017    Procedure: ESOPHAGOGASTRODUODENOSCOPY WITH  ANESTHESIA;  Surgeon: Tom Velasco DO;  Location: [facility] ENDOSCOPY;  Service:     EYE SURGERY      RETINA    HEMORRHOIDECTOMY SIGMOIDOSCOPY N/A 3/21/2023    Procedure: HEMORRHOIDECTOMY WITH EXAM UNDER ANESTHESIA;  Surgeon: Holly Chavez MD;  Location: [facility] OR;  Service: General;  Laterality: N/A;    HYSTERECTOMY  12/20/2017    ORIF TIBIA/FIBULA FRACTURES Left 2000    TRANSVAGINAL TAPING SUSPENSION N/A 11/06/2017    Procedure: VAGINAL MESH REVISION;  Surgeon: Shasta Madrigal MD;  Location: [facility] OR;  Service:     VAGINAL MESH REVISION  2013     PT Assessment (last 12 hours)       PT Evaluation and Treatment       Row Name 12/23/23 1151          Physical Therapy Time and Intention    Subjective Information complains of;fatigue  -     Document Type therapy note (daily note)  -     Mode of Treatment physical therapy  -       Row Name 12/23/23 1151          General Information    Existing Precautions/Restrictions fall  -       Row Name 12/23/23 1151          Pain    Pretreatment Pain Rating 0/10 - no pain  -     Posttreatment Pain Rating 0/10 - no pain  -       Row Name 12/23/23 1151          Pain Scale: FACES Pre/Post-Treatment    Pain: FACES Scale, Pretreatment 0-->no hurt  -       Row Name 12/23/23 1151          Bed Mobility    Supine-Sit [place] (Bed Mobility) verbal cues;contact guard  -     Sit-Supine [place] (Bed Mobility) contact guard  -     Assistive Device (Bed Mobility) bed rails  -       Row Name 12/23/23 1151          Transfers    Comment, (Transfers) stood x4. forward flexed, rests on arms  -       Row Name 12/23/23 1151          Sit-Stand Transfer    Sit-Stand [place] (Transfers) verbal cues;contact guard  -     Assistive Device (Sit-Stand Transfers) walker, front-wheeled  -       Row Name 12/23/23 1151          Stand-Sit Transfer    Stand-Sit [place] (Transfers) contact guard  -       Row Name 12/23/23 1151          Gait/Stairs (Locomotion)     [place] Level (Gait) verbal cues;minimum assist (75% patient effort)  -     Assistive Device (Gait) walker, front-wheeled  -     Distance in Feet (Gait) 18  -     Deviations/Abnormal Patterns (Gait) stride length decreased;weight shifting decreased  -     Bilateral Gait Deviations forward flexed posture;weight shift ability decreased  -     Comment, (Gait/Stairs) severe forward flexion, resting forearms on wx. Refuses to davis coorect usage.  -       Row Name 12/23/23 1151          Balance    Static Sitting Balance supervision  -     Dynamic Sitting Balance supervision  -     Position, Sitting Balance unsupported;sitting edge of bed  -     Balance Interventions sitting;dynamic reaching;core stability exercise  -       Row Name 12/23/23 1151          Motor Skills    Comments, Therapeutic Exercise AROM B LE's at EOB x20.  -               User Key  (r) = Recorded By, (t) = Taken By, (c) = Cosigned By      Initials Name Provider Type     Daniella Samuels, CHANDLER Physical Therapist Assistant                    Physical Therapy Education       Title: PT OT SLP Therapies (In Progress)       Topic: Physical Therapy (In Progress)       Point: Mobility training (In Progress)       Learning Progress Summary             Patient Acceptance, E,D, NR by  at 12/23/2023 1346    Comment: Safety with Rwx    Acceptance, E,D, DU,VU by  at 12/20/2023 1014    Comment: benefits of PT and POC, call for A OOB                         Point: Precautions (Done)       Learning Progress Summary             Patient Acceptance, E,D, DU,VU by  at 12/20/2023 1014    Comment: benefits of PT and POC, call for A OOB                                         User Key       Initials Effective Dates Name Provider Type Discipline     02/03/23 -  Terrell Larkin, PT Physical Therapist PT     02/03/23 -  Daniella Samuels PTA Physical Therapist Assistant PT                  PT Recommendation and Plan         Outcome Measures        Row Name 12/23/23 1300 12/22/23 1128 12/21/23 1425       How much help from another person do you currently need...    Turning from your back to your side while in flat bed without using bedrails? 4  -CAITLYN 3  -OUSMANE 3  -OUSMANE    Moving from lying on back to sitting on the side of a flat bed without bedrails? 4  -CAITLYN 3  -OUSMANE 3  -OUSMANE    Moving to and from a bed to a chair (including a wheelchair)? 3  -CAITLYN 3  -OUSMANE 3  -OUSMANE    Standing up from a chair using your arms (e.g., wheelchair, bedside chair)? 3  -CAITLYN 3  -OUSMANE 3  -OUSMANE    Climbing 3-5 steps with a railing? 3  -CAITLYN 2  -OUSMANE 1  -OUSMANE    To walk in hospital room? 3  -CAITLYN 3  -OUSMANE 3  -OUSMNAE    AM-PAC 6 Clicks Score (PT) 20  -CAITLYN 17  -OUSMANE 16  -OUSMANE    Highest Level of Mobility Goal 6 --> Walk 10 steps or more  -CAITLYN 5 --> Static standing  -OUSMANE 5 --> Static standing  -OUSMANE       Functional Assessment    Outcome Measure Options -- AM-PAC 6 Clicks Basic Mobility (PT)  -OUSMANE AM-PAC 6 Clicks Basic Mobility (PT)  -OUSMANE              User Key  (r) = Recorded By, (t) = Taken By, (c) = Cosigned By      Initials Name Provider Type    Daniella Carter PTA Physical Therapist Assistant    Sadi Sunshine, CHANDLER Physical Therapist Assistant                     Time Calculation:    PT Charges       Row Name 12/23/23 1349             Time Calculation    Start Time 1151  -CAITLYN      Stop Time 1217  -CAITLYN      Time Calculation (min) 26 min  -CAITLYN         Timed Charges    40454 - PT Therapeutic Exercise Minutes 15  -CAITLYN      68186 - Gait Training Minutes  11  -CAITLYN         Total Minutes    Timed Charges Total Minutes 26  -CAITLYN       Total Minutes 26  -CAITLYN                User Key  (r) = Recorded By, (t) = Taken By, (c) = Cosigned By      Initials Name Provider Type    Daniella Carter PTA Physical Therapist Assistant                  Therapy Charges for Today       Code Description Service Date Service Provider Modifiers Qty    [id number]  GAIT TRAINING EA 15 MIN 12/23/2023 Daniella Samuels PTA GP 1    66079419132 HC PT  THER PROC EA 15 MIN 12/23/2023 Daniella Samuels, PTA GP 1            PT G-Codes  Outcome Measure Options: AM-PAC 6 Clicks Basic Mobility (PT)  AM-PAC 6 Clicks Score (PT): 20  AM-PAC 6 Clicks Score (OT): 18    Daniella Samuels, CHANDLER  12/23/2023

## 2023-12-23 NOTE — PLAN OF CARE
Problem: Skin Injury Risk Increased  Goal: Skin Health and Integrity  Outcome: Ongoing, Progressing  Intervention: Optimize Skin Protection  Recent Flowsheet Documentation  Taken 12/22/2023 2000 by My Grey RN  Head of Bed (HOB) Positioning: HOB at 30-45 degrees   Goal Outcome Evaluation:  Plan of Care Reviewed With: patient        Progress: improving  Outcome Evaluation: Patient had a good night.  No c/o pain or discomfort.  Bottom is clearing up.  Patient has been incontinent all night.  Good urine output.  VSS.  Tele  monitor was d/c yesterday.  Cont to monitor  Call for concenrs.

## 2023-12-24 ENCOUNTER — READMISSION MANAGEMENT (OUTPATIENT)
Dept: CALL CENTER | Facility: HOSPITAL | Age: 83
End: 2023-12-24
Payer: MEDICARE

## 2023-12-24 VITALS
TEMPERATURE: 97.8 F | SYSTOLIC BLOOD PRESSURE: 130 MMHG | HEART RATE: 71 BPM | WEIGHT: 144 LBS | DIASTOLIC BLOOD PRESSURE: 50 MMHG | HEIGHT: 62 IN | OXYGEN SATURATION: 93 % | RESPIRATION RATE: 16 BRPM | BODY MASS INDEX: 26.5 KG/M2

## 2023-12-24 RX ORDER — VANCOMYCIN HYDROCHLORIDE 125 MG/1
125 CAPSULE ORAL EVERY 6 HOURS SCHEDULED
Qty: 20 CAPSULE | Refills: 0 | Status: SHIPPED | OUTPATIENT
Start: 2023-12-24 | End: 2023-12-29

## 2023-12-24 RX ADMIN — VANCOMYCIN HYDROCHLORIDE 125 MG: 125 CAPSULE ORAL at 02:30

## 2023-12-24 RX ADMIN — CARVEDILOL 12.5 MG: 6.25 TABLET, FILM COATED ORAL at 09:40

## 2023-12-24 RX ADMIN — CYCLOBENZAPRINE 10 MG: 10 TABLET, FILM COATED ORAL at 09:40

## 2023-12-24 RX ADMIN — VANCOMYCIN HYDROCHLORIDE 125 MG: 125 CAPSULE ORAL at 09:40

## 2023-12-24 RX ADMIN — LEVOTHYROXINE SODIUM 50 MCG: 50 TABLET ORAL at 05:08

## 2023-12-24 RX ADMIN — ATORVASTATIN CALCIUM 40 MG: 40 TABLET ORAL at 09:40

## 2023-12-24 RX ADMIN — FAMOTIDINE 20 MG: 20 TABLET, FILM COATED ORAL at 09:40

## 2023-12-24 NOTE — CASE MANAGEMENT/SOCIAL WORK
Continued Stay Note   [facility]     Patient Name: Jennifer Gomes  MRN: [id number]  Today's Date: 12/24/2023    Admit Date: 12/18/2023    Plan: [facility]   Discharge Plan       Row Name 12/24/23 0959       Plan    Plan [facility]    Final Discharge Disposition Code 06 - home with home health care    Final Note Patient is discharged today with orders for home health. Patient says she has previously used [facility]. MICHAEL called [facility] and spoke to Paola in intake. Paola confirmed patient has had them in the past. MICHAEL faxed referral to Paola at [phone]. Paola says home [facility] will admit patient at home on Tuesday, 12/26.                    Expected Discharge Date and Time       Expected Discharge Date Expected Discharge Time    Dec 24, 2023               MICHAEL Tyler

## 2023-12-24 NOTE — PLAN OF CARE
Goal Outcome Evaluation:              Outcome Evaluation: VSS, on RA, c/o of buttock pain, prn pain med given, sleeping between care, incont of urine, no bm today, appetite fair, walked approx 18 ft with PT, bed alarm in use, prob home tomorrow with family, safety maintained

## 2023-12-24 NOTE — DISCHARGE SUMMARY
[facility]  DISCHARGE SUMMARY       Date of Admission: 12/18/2023  Date of Discharge:  12/24/2023  Primary Care Physician: Olivia Mora APRN    Presenting Problem/History of Present Illness:  Fall, confusion, hypokalemia, TOMÁS    Final Discharge Diagnoses:  Active Hospital Problems    Diagnosis     **Hypokalemia     UTI (urinary tract infection)     Toxic metabolic encephalopathy     Acute renal failure superimposed on stage 3 chronic kidney disease        Consults: None    Procedures Performed: None    Pertinent Test Results:   Results for orders placed during the hospital encounter of 09/04/23    Adult Transthoracic Echo Complete W/ Cont if Necessary Per Protocol    Interpretation Summary    Left ventricular systolic function is normal. Left ventricular ejection fraction appears to be 56 - 60%.    Left ventricular wall thickness is consistent with moderate concentric hypertrophy.    Left ventricular diastolic function is consistent with (grade Ia w/high LAP) impaired relaxation.    The left atrial cavity is moderate to severely dilated.    Left atrial volume is moderately increased.    The right atrial cavity is borderline dilated.    Estimated right ventricular systolic pressure from tricuspid regurgitation is mildly elevated (35-45 mmHg).      Imaging Results (All)       Procedure Component Value Units Date/Time    CT Head Without Contrast [[id number]] Collected: 12/18/23 1355     Updated: 12/18/23 1401    Narrative:      Exam: CT HEAD WO CONTRAST- 12/18/2023 12:41 PM     HISTORY: Altered mental status     DOSE LENGTH PRODUCT: 804 mGy cm. Automated exposure control was also  utilized to decrease patient radiation dose.     Technique:  Helically acquired CT of the brain without IV contrast was performed.  Sagittal and coronal reformations are also provided for review. Soft  tissue and bone kernels are available for interpretation.     Comparison:  11/5/2023.     Findings:     Ventricles and extra-axial CSF spaces are mildly proportionally  prominent, likely due to atrophy.     No intraparenchymal or extra-axial hemorrhage.     Gray-white matter differentiation is preserved. Moderate periventricular  white matter low-attenuation.     Orbits are grossly unremarkable. Paranasal sinuses are clear. Mastoid  air cells are clear.     No suspicious calvarial or extracranial soft tissue abnormality.     Other:None.       Impression:      Impression:       No acute intracranial abnormality.     Stable atrophy and chronic small vessel ischemic changes.     This report was signed and finalized on 12/18/2023 1:58 PM by Sunny Degroot.       XR Pelvis 1 or 2 View [[id number]] Collected: 12/18/23 1331     Updated: 12/18/23 1335    Narrative:      EXAMINATION: XR PELVIS 1 OR 2 VW-  12/18/2023 1:31 PM     HISTORY: Pelvic pain.     FINDINGS: AP radiograph of the pelvis demonstrates no acute fracture or  dislocation. The femoral heads are concentric and well located within  the acetabulum. The SI joints and pubic symphysis are intact with no  evidence of diastases.       Impression:      1. No acute fracture.     This report was signed and finalized on 12/18/2023 1:32 PM by Dr. Chi Hinds MD.       XR Chest 1 View [[id number]] Collected: 12/18/23 1237     Updated: 12/18/23 1241    Narrative:      XR CHEST 1 VW- 12/18/2023 11:23 AM     HISTORY: ams       COMPARISON: 11/5/2023     FINDINGS:  Upright frontal radiograph of the chest was obtained     Chronic elevation of the right hemidiaphragm. No lung consolidation. No  pleural effusion or pneumothorax. The cardiomediastinal silhouette and  pulmonary vascularity are within normal limits. The osseous structures  and surrounding soft tissues demonstrate no acute abnormality.       Impression:      1.  Stable chest exam without acute process. No acute infiltrate.     This report was signed and finalized on 12/18/2023 12:38 PM  by Dr Tenzin Madrigal.             LAB RESULTS:      Lab 12/21/23  0356 12/20/23  0529 12/19/23  0809 12/18/23  1813 12/18/23  1524 12/18/23  1311 12/18/23  1226   WBC 4.98 8.07 11.02*  --   --   --  22.67*   HEMOGLOBIN 9.2* 8.3* 7.5*  --   --   --  10.1*   HEMATOCRIT 32.3* 27.1* 24.1*  --   --   --  32.6*   PLATELETS 172 179 172  --   --   --  223   NEUTROS ABS 3.65 6.02 9.61*  --   --   --  19.82*   IMMATURE GRANS (ABS)  --  0.04 0.06*  --   --   --  0.16*   LYMPHS ABS  --  1.30 0.78  --   --   --  1.54   MONOS ABS  --  0.55 0.52  --   --   --  1.09*   EOS ABS 0.20 0.14 0.04  --   --   --  0.02   MCV 99.1* 92.5 90.6  --   --   --  93.4   PROCALCITONIN  --   --   --   --   --  2.14*  --    LACTATE  --   --   --  1.5 2.1*  --   --    PROTIME  --  18.0*  --   --   --   --  35.9*         Lab 12/23/23  0506 12/21/23  0817 12/21/23  0356 12/20/23  1548 12/20/23  0529 12/19/23  1819 12/19/23  0809 12/18/23  1311   SODIUM 140  --  140  --  143  --  143 140   POTASSIUM 4.2 4.3 3.7 3.2* 2.9*   < > 1.9* 2.2*   CHLORIDE 111*  --  112*  --  113*  --  111* 104   CO2 23.0  --  15.0*  --  16.0*  --  15.0* 11.0*   ANION GAP 6.0  --  13.0  --  14.0  --  17.0* 25.0*   BUN 17  --  21  --  29*  --  41* 41*   CREATININE 0.70  --  0.97  --  1.06*  --  1.52* 2.14*   EGFR 87.0  --  58.8*  --  52.9*  --  34.3* 22.8*   GLUCOSE 99  --  125*  --  100*  --  123* 104*   CALCIUM 9.3  --  9.2  --  9.0  --  7.9* 7.7*   MAGNESIUM  --   --  2.0  --   --   --   --  1.7    < > = values in this interval not displayed.         Lab 12/18/23  1311   TOTAL PROTEIN 6.2   ALBUMIN 2.5*   GLOBULIN 3.7   ALT (SGPT) <5   AST (SGOT) 10   BILIRUBIN <0.2   ALK PHOS 76         Lab 12/20/23  0529 12/18/23  1226   PROTIME 18.0* 35.9*   INR 1.47* 3.52*                 Brief Urine Lab Results  (Last result in the past 365 days)        Color   Clarity   Blood   Leuk Est   Nitrite   Protein   CREAT   Urine HCG        12/18/23 1306 Yellow   Turbid   Negative   Large (3+)    Negative   100 mg/dL (2+)                 Microbiology Results (last 10 days)       Procedure Component Value - Date/Time    Clostridioides difficile Toxin - Stool, Per Rectum [[id number]]  (Abnormal) Collected: 12/19/23 0425    Lab Status: Final result Specimen: Stool from Per Rectum Updated: 12/19/23 0636    Narrative:      The following orders were created for panel order Clostridioides difficile Toxin - Stool, Per Rectum.  Procedure                               Abnormality         Status                     ---------                               -----------         ------                     Clostridioides difficile...[[id number]]  Abnormal            Final result                 Please view results for these tests on the individual orders.    Clostridioides difficile Toxin, PCR - Stool, Per Rectum [[id number]]  (Abnormal) Collected: 12/19/23 0425    Lab Status: Final result Specimen: Stool from Per Rectum Updated: 12/19/23 0636     Toxigenic C. difficile by PCR Positive    Narrative:      DNA from a toxigenic strain of C.difficile has been detected. Antigen testing for the presence of free C.difficile toxin is currently in progress, to help determine the clinical significance of this PCR result.     Clostridioides difficile toxin Ag, Reflex - Stool, Per Rectum [[id number]]  (Normal) Collected: 12/19/23 0425    Lab Status: Final result Specimen: Stool from Per Rectum Updated: 12/19/23 0637     C.diff Toxin Ag Negative    Narrative:      DNA from a toxigenic strain of C.difficile was detected, although the free toxin itself was not detected. These findings are consistent with C.difficile colonization and may not reflect actual C.difficile infection. Clinical correlation needed.    Blood Culture - Blood, Arm, Right [[id number]]  (Normal) Collected: 12/18/23 1524    Lab Status: Final result Specimen: Blood from Arm, Right Updated: 12/23/23 1617     Blood Culture No growth at 5 days    Blood Culture - Blood, Arm, Left  [[id number]]  (Normal) Collected: 12/18/23 1524    Lab Status: Final result Specimen: Blood from Arm, Left Updated: 12/23/23 1617     Blood Culture No growth at 5 days    Urine Culture - Urine, Straight Cath [[id number]] Collected: 12/18/23 1306    Lab Status: Final result Specimen: Urine from Straight Cath Updated: 12/19/23 0901     Urine Culture >100,000 CFU/mL Mixed Irina Isolated    Narrative:      Specimen contains mixed organisms of questionable pathogenicity suggestive of contamination. If symptoms persist, suggest recollection.  Colonization of the urinary tract without infection is common. Treatment is discouraged unless the patient is symptomatic, pregnant, or undergoing an invasive urologic procedure.            Hospital Course:   Ms. Gomes is an 83-year-old female who presented to [facility] on 12/18 for worsening confusion and fall.  She is on Aricept but there is no diagnosis of dementia and patient and her  deny this.  Of note, she was admitted to our hospital in November 2023 for acute urinary tract infection with bacteremia due to Enterococcus.  She was discharged home on Augmentin.  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  Reportedly patient fell so she was brought to the emergency department.  She has had poor oral intake and may have had some diarrhea.  In the ED, urinalysis suggestive of urinary tract infection.  Creatinine elevated at 2.14 consistent with acute kidney injury as well as hypokalemia with potassium 2.2.  CT head showed no acute findings.  Pelvis x-ray showed no acute findings.  Chest x-ray showed no acute findings.  1 L fluid bolus given in ED.    She was treated for acute urinary tract infection.  Urine culture in November showed E. coli only resistant to Levaquin.  Blood culture no growth to date.  Urine culture shows mixed irina.  She completed 3-day course of ceftriaxone and cefdinir.  No further dysuria, frequency, hesitancy noted.  She has remained  "afebrile.    Serum potassium was originally low.  Potassium supplementation given and serum potassium stabilized.    Creatinine on 11/8/2023 was 1.6.  Patient arrived with an acute kidney injury with a creatinine of 2.14.  After treatment of her UTI and IV fluids, creatinine improved to 0.7 with a GFR of 87.    Prior to arrival, patient had recently been treated with Augmentin.  She presented with diarrhea.  C. difficile PCR positive.  She was initiated on oral vancomycin.  Diarrhea improved and ultimately resolved.  Patient denies abdominal cramping at this time.  Plan to discharge on oral vancomycin for an additional 5 days which would make a total of 10-day treatment.    Hemoglobin 10.1 on admission.  She has received IV fluid.  No signs of bleeding.  During last hospitalization hemoglobin was down to 6.5. Patient refused blood transfusion.  Hemoglobin was 7.3 at time of discharge.  Reviewed prior records of colonoscopy and EGD.  She has been found with diverticulosis.  She also had shown ulcer with no stigmata of bleeding on EGD. Patient was started on Retacrit and appointment made for patient to see hematology on 1/10/2024. Hemoglobin 9.2 on 12/22, no signs or symptoms of bleeding.    Patient was evaluated by physical and Occupational Therapy.  They recommend SNF placement.  Originally, patient and daughter were agreeable to this.  However, patient has made improvement with ambulation and after being given this option, patient and daughter Lola wish to discharge home.  They have no further concerns or questions and tell me they have all needed DME at home.  Home health physical and Occupational Therapy to be ordered at discharge.    Complete oral vancomycin course  Recommend follow-up with PCP in 1 week     Physical Exam on Discharge:  /50 (BP Location: Right arm, Patient Position: Lying)   Pulse 71   Temp 97.8 °F (36.6 °C) (Oral)   Resp 16   Ht 157.2 cm (61.89\")   Wt 65.3 kg (144 lb)   LMP  (LMP " Unknown)   SpO2 93%   BMI 26.43 kg/m²   Physical Exam  Vitals reviewed.   Constitutional:       General: She is not in acute distress.     Appearance: She is not toxic-appearing.      Comments: Up in bed, room air, no acute distress, no visitors at bedside   HENT:      Head: Normocephalic and atraumatic.      Mouth/Throat:      Mouth: Mucous membranes are moist.      Pharynx: Oropharynx is clear.   Eyes:      Extraocular Movements: Extraocular movements intact.      Conjunctiva/sclera: Conjunctivae normal.      Pupils: Pupils are equal, round, and reactive to light.   Cardiovascular:      Rate and Rhythm: Normal rate and regular rhythm.      Pulses: Normal pulses.   Pulmonary:      Effort: Pulmonary effort is normal. No respiratory distress.      Breath sounds: Normal breath sounds. No wheezing or rhonchi.   Abdominal:      General: Bowel sounds are normal. There is no distension.      Palpations: Abdomen is soft.      Tenderness: There is no abdominal tenderness.   Musculoskeletal:         General: No swelling or tenderness. Normal range of motion.      Cervical back: Normal range of motion and neck supple. No muscular tenderness.   Skin:     General: Skin is warm and dry.      Findings: No erythema or rash.   Neurological:      General: No focal deficit present.      Mental Status: She is alert. Mental status is at baseline.      Cranial Nerves: No cranial nerve deficit.      Motor: No weakness.   Psychiatric:         Mood and Affect: Mood normal.         Behavior: Behavior normal.     Condition on Discharge: Stable    Discharge Disposition:  Home-Health Care [facility] – [place], [place]    Discharge Medications:     Discharge Medications        New Medications        Instructions Start Date   vancomycin 125 MG capsule  Commonly known as: VANCOCIN   125 mg, Oral, Every 6 Hours Scheduled             Continue These Medications        Instructions Start Date   acetaminophen 325 MG tablet  Commonly known as: TYLENOL   650 mg, Oral, Every 6  Hours PRN      atorvastatin 40 MG tablet  Commonly known as: LIPITOR   40 mg, Oral, Daily      butalbital-acetaminophen-caffeine -40 MG per tablet  Commonly known as: FIORICET, ESGIC   1 tablet, Oral, 2 Times Daily PRN      carvedilol 12.5 MG tablet  Commonly known as: COREG   12.5 mg, Oral, 2 Times Daily With Meals      colestipol 1 g tablet  Commonly known as: COLESTID   1 g, Oral, 2 Times Daily      cyclobenzaprine 10 MG tablet  Commonly known as: FLEXERIL   10 mg, Oral, 2 Times Daily      donepezil 10 MG tablet  Commonly known as: ARICEPT   10 mg, Oral, Nightly      ergocalciferol 1.25 MG (28947 UT) capsule  Commonly known as: ERGOCALCIFEROL   50,000 Units, Oral, Weekly, Saturday      levothyroxine 50 MCG tablet  Commonly known as: SYNTHROID, LEVOTHROID   50 mcg, Oral, Daily      naloxone 4 MG/0.1ML nasal spray  Commonly known as: NARCAN   1 spray, Nasal, As Needed      oxyCODONE ER 15 MG tablet extended-release 12 hour  Commonly known as: oxyCONTIN   15 mg, Oral, 4 Times Daily      sertraline 25 MG tablet  Commonly known as: ZOLOFT   25 mg, Oral, Daily      SUMAtriptan 50 MG tablet  Commonly known as: IMITREX   50 mg, Oral, 2 Times Daily PRN      venlafaxine 25 MG tablet  Commonly known as: EFFEXOR   25 mg, Oral, Daily             Discharge Diet:   Diet Instructions       Diet: Regular/House Diet; Soft to Chew (NDD 3); Ground Meat; Thin (IDDSI 0)      Discharge Diet: Regular/House Diet    Texture: Soft to Chew (NDD 3)    Soft to Chew: Ground Meat    Fluid Consistency: Thin (IDDSI 0)            Activity at Discharge:   Activity Instructions       Up WIth Assist              Follow-up Appointments:   Future Appointments   Date Time Provider Department Center   1/10/2024 12:30 PM  [facility]   1/10/2024  1:00 PM Daiana Avitia APRN [facility]       Test Results Pending at Discharge: None    Electronically signed by MORGAN Shields, 12/24/23, 09:31 CST.    Time: 35  minutes.

## 2023-12-24 NOTE — DISCHARGE PLACEMENT REQUEST
"Call back: Rebeca Serrato, [phone]    MireyaJennifer MIKEY (83 y.o. Female)       Date of Birth   1940    Social Security Number       Address   [address] [facility] [address]    Home Phone   [phone]    MRN   [id number]       Congregation   [employer]    Marital Status                               Admission Date   12/18/23    Admission Type   Emergency    Admitting Provider   Cory Tirado MD    Attending Provider   Cory Tirado MD    Department, Room/Bed   [facility] 4B, 444/1       Discharge Date       Discharge Disposition   [place]-Health Care [facility]    Discharge Destination                                 Attending Provider: Cory Tirado MD    Allergies: Ropinirole Hcl, Codeine, Definity [Perflutren Lipid Microsphere], Ambien [Zolpidem], Eszopiclone, Pregabalin, Tizanidine    Isolation: Spore   Infection: C.difficile (12/19/23)   Code Status: CPR    Ht: 157.2 cm (61.89\")   Wt: 65.3 kg (144 lb)    Admission Cmt: None   Principal Problem: Hypokalemia [E87.6]                   Active Insurance as of 12/18/2023       Primary Coverage       Payor Plan Insurance Group Employer/Plan Group    [employer] HE[employer] MEDICARE REPLACEMENT [employer] MED ADV GROUP [id number]       Payor Plan Address Payor Plan Phone Number Payor Plan Fax Number Effective Dates    [address] [address]   1/1/2023 - None Entered    [facility] [id number]         Subscriber Name Subscriber Birth Date Member ID       JENNIFER SOLIMAN MIKEY 1940 [id number]                     Emergency Contacts        (Rel.) Home Phone Work Phone Mobile Phone    MireyaAdams (Spouse) [phone] -- [phone]    Ivonne Liang (Daughter) [phone] -- --              Insurance Information                  UNITED [employer] MEDICARE REPLACEMENT/[employer] MED ADV GROUP Phone: --    Subscriber: Jennifer Soliman Subscriber#: [id number]    Group#: [id number] Precert#: --             History & Physical    "     Cory Tirado MD at 23 1541              [facility] Medicine Services  HISTORY AND PHYSICAL    Date of Admission: 2023  Primary Care Physician: Olivia Mora APRN    Subjective   Primary Historian: Patient and her     Chief Complaint: Fall, confusion    History of Present Illness  Patient is an 83-year-old woman with multiple comorbidities including depression, anxiety, fibromyalgia, insomnia, essential hypertension.  She is on Aricept but there is no diagnosis of dementia and patient and her  deny this.  She presents with complaints of a fall today and worsening confusion.    Patient's  and the patient are poor historians. However they report that patient fell today so they brought her to the emergency room. She has not been eating much and they report she had some diarrhea.  Patient is confused and irritable.  She has a leukocytosis in the ER and urinalysis was suggestive of UTI.  She also had elevated creatinine consistent with TOMÁS as well as hypokalemia. She was recommended for admission.    Review of Systems   Otherwise complete ROS reviewed and negative except as mentioned in the HPI.    Past Medical History:   Past Medical History:   Diagnosis Date    Anxiety     Arthritis     Bronchitis     Cancer     uterine    Chronic pain     Depression     Disease of thyroid gland     Fibromyalgia     GERD (gastroesophageal reflux disease)     Headache     History of transfusion     AS     Hyperlipidemia     Hypertension     Incontinence     Insomnia     Leg pain     Lumbar stenosis     Migraines     Peptic ulcer     Restless legs     Sleep apnea     NO C-PAP    UTI (urinary tract infection)     Vaginal bleeding      Past Surgical History:  Past Surgical History:   Procedure Laterality Date    BLADDER REPAIR      MESH HAD TO BE REMOVED IN 2013    BREAST BIOPSY Right 2017    benign    BREAST CYST EXCISION Left     CARDIAC  CATHETERIZATION      CARPAL TUNNEL RELEASE      CATARACT EXTRACTION W/ INTRAOCULAR LENS  IMPLANT, BILATERAL      CHOLECYSTECTOMY WITH INTRAOPERATIVE CHOLANGIOGRAM N/A 9/7/2023    Procedure: CHOLECYSTECTOMY LAPAROSCOPIC INTRAOPERATIVE CHOLANGIOGRAM;  Surgeon: Gerardo Arciniega MD;  Location: [facility] OR;  Service: General;  Laterality: N/A;    COLONOSCOPY      COLONOSCOPY N/A 10/01/2021    Procedure: COLONOSCOPY WITH ANESTHESIA;  Surgeon: Tom Velasco DO;  Location: [facility] ENDOSCOPY;  Service: Gastroenterology;  Laterality: N/A;  pre: change in bowel habits  post: diverticulosis. hemorrhoids.   Olivia Mora APRN        CYSTECTOMY      D & C HYSTEROSCOPY N/A 11/06/2017    Procedure: DILATATION AND CURETTAGE HYSTEROSCOPY;  Surgeon: Shasta Madrigal MD;  Location: [facility] OR;  Service:     DILATION AND CURETTAGE, DIAGNOSTIC / THERAPEUTIC  2008    ENDOSCOPY  09/23/2010    Short segment of Arriola's,Moderate chroninc esophagogastritis and negative H.pylori    ENDOSCOPY N/A 09/25/2017    Procedure: ESOPHAGOGASTRODUODENOSCOPY WITH ANESTHESIA;  Surgeon: Tom Velasco DO;  Location: [facility] ENDOSCOPY;  Service:     EYE SURGERY      RETINA    HEMORRHOIDECTOMY SIGMOIDOSCOPY N/A 3/21/2023    Procedure: HEMORRHOIDECTOMY WITH EXAM UNDER ANESTHESIA;  Surgeon: Holly Chavez MD;  Location: [facility] OR;  Service: General;  Laterality: N/A;    HYSTERECTOMY  12/20/2017    ORIF TIBIA/FIBULA FRACTURES Left 2000    TRANSVAGINAL TAPING SUSPENSION N/A 11/06/2017    Procedure: VAGINAL MESH REVISION;  Surgeon: Shasta Madrigal MD;  Location: [facility] OR;  Service:     VAGINAL MESH REVISION  2013     Social History:  reports that she has never smoked. She has never used smokeless tobacco. She reports that she does not currently use alcohol. She reports that she does not use drugs.    Family History: family history includes Cancer in her paternal grandmother; Diabetes in her mother and sister; Lung cancer in her paternal  grandfather; Lymphoma in her brother; Multiple myeloma in her mother; Ovarian cancer in her paternal aunt; Prostate cancer in her brother; Stroke in her father.       Allergies:  Allergies   Allergen Reactions    Ropinirole Hcl Shortness Of Breath    Codeine Itching and Mental Status Change    Definity [Perflutren Lipid Microsphere] Other (See Comments)     Severe back pain    Ambien [Zolpidem] Other (See Comments)     HYPER     Eszopiclone Other (See Comments)     MAKES PT HYPER     Pregabalin Dizziness    Tizanidine Other (See Comments)     Terrible nightmares     Medications:  Prior to Admission medications    Medication Sig Start Date End Date Taking? Authorizing Provider   acetaminophen (TYLENOL) 325 MG tablet Take 2 tablets by mouth Every 6 (Six) Hours As Needed for Mild Pain.    Tamiko Gaviria MD   atorvastatin (LIPITOR) 40 MG tablet Take 1 tablet by mouth Daily. 1/16/23   Tamiko Gaviria MD   butalbital-acetaminophen-caffeine (FIORICET, ESGIC) -40 MG per tablet Take 1 tablet by mouth 2 (Two) Times a Day As Needed for Headache. 9/25/23   Ana Saldivar APRN   carvedilol (COREG) 12.5 MG tablet Take 1 tablet by mouth 2 (Two) Times a Day With Meals. 8/28/19   Tamiko Gaviria MD   cyclobenzaprine (FLEXERIL) 10 MG tablet Take 1 tablet by mouth 2 (Two) Times a Day. 11/17/22   Tamiko Gaviria MD   donepezil (ARICEPT) 10 MG tablet Take 1 tablet by mouth Every Night. 10/18/22   Tamiko Gaviria MD   ergocalciferol (ERGOCALCIFEROL) 67491 units capsule Take 1 capsule by mouth 1 (One) Time Per Week. Saturday 4/17/19   Tamiko Gaviria MD   LACTASE ENZYME PO Take 3 tablets by mouth As Needed (takes before dairy products).    Tamiko Gaviria MD   lansoprazole (PREVACID) 30 MG capsule Take 1 capsule by mouth Every Morning. 3/19/20   Tamiko Gaviria MD   levothyroxine (SYNTHROID, LEVOTHROID) 50 MCG tablet Take 1 tablet by mouth Every Morning.    Tamiko Gaviria MD  "  lidocaine (LIDODERM) 5 % Place 2 patches on the skin as directed by provider Daily. Remove & Discard patch within 12 hours or as directed by MD 11/9/23   Jordan Cummins MD   polyethylene glycol (MIRALAX) 17 g packet Take 17 g by mouth Daily As Needed (Constipation).    Provider, MD Tamiko   sertraline (ZOLOFT) 25 MG tablet Take 1 tablet by mouth Daily. 2/28/23 2/28/24  Shasta Madrigal MD   SUMAtriptan (IMITREX) 50 MG tablet Take 1 tablet by mouth 2 (Two) Times a Day As Needed for Migraine. 7/29/19   Shasta Madrigal MD   venlafaxine (EFFEXOR) 25 MG tablet Take 1 tablet by mouth Daily. 2/28/23   Shasta Madrigal MD     I have utilized all available immediate resources to obtain, update, or review the patient's current medications (including all prescriptions, over-the-counter products, herbals, cannabis/cannabidiol products, and vitamin/mineral/dietary (nutritional) supplements).    Objective     Vital Signs: /54 (BP Location: Right arm, Patient Position: Sitting)   Pulse 72   Resp 21   Ht 157.5 cm (62\")   Wt 70.3 kg (155 lb)   LMP  (LMP Unknown)   SpO2 100%   BMI 28.35 kg/m²   Physical Exam  Constitutional:       General: She is not in acute distress.     Appearance: She is ill-appearing. She is not diaphoretic.      Comments: Thin and chronically ill-appearing elderly woman.   HENT:      Head: Normocephalic and atraumatic.      Right Ear: External ear normal.      Left Ear: External ear normal.      Nose: No congestion or rhinorrhea.      Mouth/Throat:      Mouth: Mucous membranes are moist.      Pharynx: No oropharyngeal exudate or posterior oropharyngeal erythema.   Eyes:      General: No scleral icterus.     Extraocular Movements: Extraocular movements intact.      Conjunctiva/sclera: Conjunctivae normal.   Cardiovascular:      Rate and Rhythm: Normal rate and regular rhythm.      Heart sounds: Normal heart sounds. No murmur heard.  Pulmonary:      Effort: Pulmonary effort is normal. " "No respiratory distress.      Breath sounds: Normal breath sounds. No wheezing, rhonchi or rales.   Abdominal:      General: Abdomen is flat. There is no distension.      Palpations: Abdomen is soft.      Tenderness: There is no abdominal tenderness. There is no guarding.   Musculoskeletal:         General: No swelling, tenderness or deformity.      Cervical back: Neck supple. No rigidity. No muscular tenderness.      Right lower leg: No edema.      Left lower leg: No edema.   Lymphadenopathy:      Cervical: No cervical adenopathy.   Skin:     General: Skin is warm and dry.   Neurological:      General: No focal deficit present.      Mental Status: She is alert.      Cranial Nerves: No cranial nerve deficit.      Motor: No weakness.      Comments: Confused.   Psychiatric:      Comments: Irritable mood.  Confused.        Results Reviewed:  Lab Results (last 24 hours)       Procedure Component Value Units Date/Time    Lactic Acid, Plasma [[id number]] Collected: 12/18/23 1524    Specimen: Blood Updated: 12/18/23 1534    CK [[id number]]  (Normal) Collected: 12/18/23 1311    Specimen: Blood Updated: 12/18/23 1502     Creatine Kinase 50 U/L     Procalcitonin [[id number]]  (Abnormal) Collected: 12/18/23 1311    Specimen: Blood Updated: 12/18/23 1419     Procalcitonin 2.14 ng/mL     Narrative:      As a Marker for Sepsis (Non-Neonates):    1. <0.5 ng/mL represents a low risk of severe sepsis and/or septic shock.  2. >2 ng/mL represents a high risk of severe sepsis and/or septic shock.    As a Marker for Lower Respiratory Tract Infections that require antibiotic therapy:    PCT on Admission    Antibiotic Therapy       6-12 Hrs later    >0.5                Strongly Recommended  >0.25 - <0.5        Recommended   0.1 - 0.25          Discouraged              Remeasure/reassess PCT  <0.1                Strongly Discouraged     Remeasure/reassess PCT    As 28 day mortality risk marker: \"Change in Procalcitonin Result\" (>80% or <=80%) " if Day 0 (or Day 1) and Day 4 values are available. Refer to http://[email or web address]-pct-calculator.com    Change in PCT <=80%  A decrease of PCT levels below or equal to 80% defines a positive change in PCT test result representing a higher risk for 28-day all-cause mortality of patients diagnosed with severe sepsis for septic shock.    Change in PCT >80%  A decrease of PCT levels of more than 80% defines a negative change in PCT result representing a lower risk for 28-day all-cause mortality of patients diagnosed with severe sepsis or septic shock.       Comprehensive Metabolic Panel [[id number]]  (Abnormal) Collected: 12/18/23 1311    Specimen: Blood Updated: 12/18/23 1341     Glucose 104 mg/dL      BUN 41 mg/dL      Creatinine 2.14 mg/dL      Sodium 140 mmol/L      Potassium 2.2 mmol/L      Chloride 104 mmol/L      CO2 11.0 mmol/L      Calcium 7.7 mg/dL      Total Protein 6.2 g/dL      Albumin 2.5 g/dL      ALT (SGPT) <5 U/L      AST (SGOT) 10 U/L      Alkaline Phosphatase 76 U/L      Total Bilirubin <0.2 mg/dL      Globulin 3.7 gm/dL      A/G Ratio 0.7 g/dL      BUN/Creatinine Ratio 19.2     Anion Gap 25.0 mmol/L      eGFR 22.8 mL/min/1.73     Narrative:      GFR Normal >60  Chronic Kidney Disease <60  Kidney Failure <15    The GFR formula is only valid for adults with stable renal function between ages 18 and 70.    Magnesium [[id number]]  (Normal) Collected: 12/18/23 1311    Specimen: Blood Updated: 12/18/23 1335     Magnesium 1.7 mg/dL     Urinalysis, Microscopic Only - Straight Cath [[id number]]  (Abnormal) Collected: 12/18/23 1306    Specimen: Urine from Straight Cath Updated: 12/18/23 1323     RBC, UA 0-2 /HPF      WBC, UA Too Numerous to Count /HPF      Bacteria, UA 4+ /HPF      Squamous Epithelial Cells, UA 0-2 /HPF      Hyaline Casts, UA None Seen /LPF      Methodology Automated Microscopy    Urine Culture - Urine, Straight Cath [[id number]] Collected: 12/18/23 1306    Specimen: Urine from Straight Cath Updated:  12/18/23 1323    Urinalysis With Culture If Indicated - Straight Cath [[id number]]  (Abnormal) Collected: 12/18/23 1306    Specimen: Urine from Straight Cath Updated: 12/18/23 1319     Color, UA Yellow     Appearance, UA Turbid     pH, UA 6.0     Specific Gravity, UA 1.021     Glucose, UA Negative     Ketones, UA 15 mg/dL (1+)     Bilirubin, UA Negative     Blood, UA Negative     Protein,  mg/dL (2+)     Leuk Esterase, UA Large (3+)     Nitrite, UA Negative     Urobilinogen, UA 0.2 E.U./dL    Narrative:      In absence of clinical symptoms, the presence of pyuria, bacteria, and/or nitrites on the urinalysis result does not correlate with infection.    Protime-INR [[id number]]  (Abnormal) Collected: 12/18/23 1226    Specimen: Blood Updated: 12/18/23 1241     Protime 35.9 Seconds      INR 3.52    CBC & Differential [[id number]]  (Abnormal) Collected: 12/18/23 1226    Specimen: Blood Updated: 12/18/23 1233    Narrative:      The following orders were created for panel order CBC & Differential.  Procedure                               Abnormality         Status                     ---------                               -----------         ------                     CBC Auto Differential[[id number]]        Abnormal            Final result                 Please view results for these tests on the individual orders.    CBC Auto Differential [[id number]]  (Abnormal) Collected: 12/18/23 1226    Specimen: Blood Updated: 12/18/23 1233     WBC 22.67 10*3/mm3      RBC 3.49 10*6/mm3      Hemoglobin 10.1 g/dL      Hematocrit 32.6 %      MCV 93.4 fL      MCH 28.9 pg      MCHC 31.0 g/dL      RDW 16.4 %      RDW-SD 55.7 fl      MPV 11.8 fL      Platelets 223 10*3/mm3      Neutrophil % 87.4 %      Lymphocyte % 6.8 %      Monocyte % 4.8 %      Eosinophil % 0.1 %      Basophil % 0.2 %      Immature Grans % 0.7 %      Neutrophils, Absolute 19.82 10*3/mm3      Lymphocytes, Absolute 1.54 10*3/mm3      Monocytes, Absolute 1.09 10*3/mm3       Eosinophils, Absolute 0.02 10*3/mm3      Basophils, Absolute 0.04 10*3/mm3      Immature Grans, Absolute 0.16 10*3/mm3      nRBC 0.0 /100 WBC           Imaging Results (Last 24 Hours)       Procedure Component Value Units Date/Time    CT Head Without Contrast [[id number]] Collected: 12/18/23 1355     Updated: 12/18/23 1401    Narrative:      Exam: CT HEAD WO CONTRAST- 12/18/2023 12:41 PM     HISTORY: Altered mental status     DOSE LENGTH PRODUCT: 804 mGy cm. Automated exposure control was also  utilized to decrease patient radiation dose.     Technique:  Helically acquired CT of the brain without IV contrast was performed.  Sagittal and coronal reformations are also provided for review. Soft  tissue and bone kernels are available for interpretation.     Comparison: 11/5/2023.     Findings:     Ventricles and extra-axial CSF spaces are mildly proportionally  prominent, likely due to atrophy.     No intraparenchymal or extra-axial hemorrhage.     Gray-white matter differentiation is preserved. Moderate periventricular  white matter low-attenuation.     Orbits are grossly unremarkable. Paranasal sinuses are clear. Mastoid  air cells are clear.     No suspicious calvarial or extracranial soft tissue abnormality.     Other:None.       Impression:      Impression:       No acute intracranial abnormality.     Stable atrophy and chronic small vessel ischemic changes.     This report was signed and finalized on 12/18/2023 1:58 PM by Sunny Degroot.       XR Pelvis 1 or 2 View [[id number]] Collected: 12/18/23 1331     Updated: 12/18/23 1335    Narrative:      EXAMINATION: XR PELVIS 1 OR 2 VW-  12/18/2023 1:31 PM     HISTORY: Pelvic pain.     FINDINGS: AP radiograph of the pelvis demonstrates no acute fracture or  dislocation. The femoral heads are concentric and well located within  the acetabulum. The SI joints and pubic symphysis are intact with no  evidence of diastases.       Impression:      1. No acute fracture.      This report was signed and finalized on 12/18/2023 1:32 PM by Dr. Chi Hinds MD.       XR Chest 1 View [[id number]] Collected: 12/18/23 1237     Updated: 12/18/23 1241    Narrative:      XR CHEST 1 VW- 12/18/2023 11:23 AM     HISTORY: ams       COMPARISON: 11/5/2023     FINDINGS:  Upright frontal radiograph of the chest was obtained     Chronic elevation of the right hemidiaphragm. No lung consolidation. No  pleural effusion or pneumothorax. The cardiomediastinal silhouette and  pulmonary vascularity are within normal limits. The osseous structures  and surrounding soft tissues demonstrate no acute abnormality.       Impression:      1.  Stable chest exam without acute process. No acute infiltrate.     This report was signed and finalized on 12/18/2023 12:38 PM by Dr Tenzin Madrigal.             I have personally reviewed and interpreted the radiology studies and ECG obtained at time of admission.     Assessment / Plan   Assessment:   Active Hospital Problems    Diagnosis     **Hypokalemia     UTI (urinary tract infection)     Toxic metabolic encephalopathy     Acute renal failure superimposed on stage 3 chronic kidney disease      Treatment Plan  The patient will be admitted to my service here at [facility].  Will start aggressive IV fluids for rehydration and TOMÁS on CKD stage III.  Start IV antibiotics with ceftriaxone.  Follow-up blood cultures and urine cultures.    Replete potassium aggressively    Hold other home medications for now    DVT prophylaxis with SCDs    CODE STATUS is full code    Medical Decision Making  Number and Complexity of problems: 3 acute, severe, high complexity medical problems.  Differential Diagnosis: None    Conditions and Status        Condition is unchanged.     [facility] Data  External documents reviewed: None  Cardiac tracing (EKG, telemetry) interpretation: None  Radiology interpretation: Chest x-ray reviewed by me  Labs reviewed: CBC, BMP reviewed by me  Any tests  that were considered but not ordered: None     Decision rules/scores evaluated (example [id number], Wells, etc): None     Discussed with: Patient and her      Care Planning  Shared decision making: Patient and her   Code status and discussions: CODE STATUS is full code    Disposition  Social Determinants of Health that impact treatment or disposition: None  Estimated length of stay is 3 to 5 days     I confirmed that the patient's advanced care plan is present, code status is documented, and a surrogate decision maker is listed in the patient's medical record.     The patient's surrogate decision maker is patient's      The patient was seen and examined by me on 12/18/2023 at 3:45 PM.    Electronically signed by Cory Tirado MD, 12/18/23, 15:41 CST.               Electronically signed by Cory Tirado MD at 12/18/23 1914       Referral Orders (last 24 hours) (24h ago, onward)       Start     Ordered    12/24/23 0000  Ambulatory Referral to Home Health        Question Answer Comment   Face to Face Visit Date: 12/24/2023    Follow-up provider for Plan of Care? I treated the patient in an acute care facility and will not continue treatment after discharge.    Follow-up provider: TAWANNA CAROLINA    Reason/Clinical Findings Deconditioning posthospitalization for C. difficile, TOMÁS, UTI    Describe mobility limitations that make leaving home difficult: Deconditioning posthospitalization for C. difficile, TOMÁS, UTI    Nursing/Therapeutic Services Requested Physical Therapy    Nursing/Therapeutic Services Requested Occupational Therapy    PT orders: Strengthening    PT orders: Transfer training    PT orders: Gait Training    PT orders: Home safety assessment    Weight Bearing Status As Tolerated    Occupational orders: Activities of daily living    Occupational orders: Strengthening    Occupational orders: Cognition    Occupational orders: Fine motor    Occupational orders: Home safety  assessment    Frequency: 1 Week 1        12/24/23 0931                     Discharge Summary        Rogers Lloyd APRN at 12/24/23 0839                [facility] Medicine Services  DISCHARGE SUMMARY       Date of Admission: 12/18/2023  Date of Discharge:  12/24/2023  Primary Care Physician: Olivia Mora APRN    Presenting Problem/History of Present Illness:  Fall, confusion, hypokalemia, TOMÁS    Final Discharge Diagnoses:  Active Hospital Problems    Diagnosis     **Hypokalemia     UTI (urinary tract infection)     Toxic metabolic encephalopathy     Acute renal failure superimposed on stage 3 chronic kidney disease        Consults: None    Procedures Performed: None    Pertinent Test Results:   Results for orders placed during the hospital encounter of 09/04/23    Adult Transthoracic Echo Complete W/ Cont if Necessary Per Protocol    Interpretation Summary    Left ventricular systolic function is normal. Left ventricular ejection fraction appears to be 56 - 60%.    Left ventricular wall thickness is consistent with moderate concentric hypertrophy.    Left ventricular diastolic function is consistent with (grade Ia w/high LAP) impaired relaxation.    The left atrial cavity is moderate to severely dilated.    Left atrial volume is moderately increased.    The right atrial cavity is borderline dilated.    Estimated right ventricular systolic pressure from tricuspid regurgitation is mildly elevated (35-45 mmHg).      Imaging Results (All)       Procedure Component Value Units Date/Time    CT Head Without Contrast [[id number]] Collected: 12/18/23 1355     Updated: 12/18/23 1401    Narrative:      Exam: CT HEAD WO CONTRAST- 12/18/2023 12:41 PM     HISTORY: Altered mental status     DOSE LENGTH PRODUCT: 804 mGy cm. Automated exposure control was also  utilized to decrease patient radiation dose.     Technique:  Helically acquired CT of the brain without IV contrast was  performed.  Sagittal and coronal reformations are also provided for review. Soft  tissue and bone kernels are available for interpretation.     Comparison: 11/5/2023.     Findings:     Ventricles and extra-axial CSF spaces are mildly proportionally  prominent, likely due to atrophy.     No intraparenchymal or extra-axial hemorrhage.     Gray-white matter differentiation is preserved. Moderate periventricular  white matter low-attenuation.     Orbits are grossly unremarkable. Paranasal sinuses are clear. Mastoid  air cells are clear.     No suspicious calvarial or extracranial soft tissue abnormality.     Other:None.       Impression:      Impression:       No acute intracranial abnormality.     Stable atrophy and chronic small vessel ischemic changes.     This report was signed and finalized on 12/18/2023 1:58 PM by Sunny Degroot.       XR Pelvis 1 or 2 View [[id number]] Collected: 12/18/23 1331     Updated: 12/18/23 1335    Narrative:      EXAMINATION: XR PELVIS 1 OR 2 VW-  12/18/2023 1:31 PM     HISTORY: Pelvic pain.     FINDINGS: AP radiograph of the pelvis demonstrates no acute fracture or  dislocation. The femoral heads are concentric and well located within  the acetabulum. The SI joints and pubic symphysis are intact with no  evidence of diastases.       Impression:      1. No acute fracture.     This report was signed and finalized on 12/18/2023 1:32 PM by Dr. Chi Hinds MD.       XR Chest 1 View [[id number]] Collected: 12/18/23 1237     Updated: 12/18/23 1241    Narrative:      XR CHEST 1 VW- 12/18/2023 11:23 AM     HISTORY: ams       COMPARISON: 11/5/2023     FINDINGS:  Upright frontal radiograph of the chest was obtained     Chronic elevation of the right hemidiaphragm. No lung consolidation. No  pleural effusion or pneumothorax. The cardiomediastinal silhouette and  pulmonary vascularity are within normal limits. The osseous structures  and surrounding soft tissues demonstrate no acute  abnormality.       Impression:      1.  Stable chest exam without acute process. No acute infiltrate.     This report was signed and finalized on 12/18/2023 12:38 PM by Dr Tenzin Madrigal.             LAB RESULTS:      Lab 12/21/23  0356 12/20/23  0529 12/19/23  0809 12/18/23  1813 12/18/23  1524 12/18/23  1311 12/18/23  1226   WBC 4.98 8.07 11.02*  --   --   --  22.67*   HEMOGLOBIN 9.2* 8.3* 7.5*  --   --   --  10.1*   HEMATOCRIT 32.3* 27.1* 24.1*  --   --   --  32.6*   PLATELETS 172 179 172  --   --   --  223   NEUTROS ABS 3.65 6.02 9.61*  --   --   --  19.82*   IMMATURE GRANS (ABS)  --  0.04 0.06*  --   --   --  0.16*   LYMPHS ABS  --  1.30 0.78  --   --   --  1.54   MONOS ABS  --  0.55 0.52  --   --   --  1.09*   EOS ABS 0.20 0.14 0.04  --   --   --  0.02   MCV 99.1* 92.5 90.6  --   --   --  93.4   PROCALCITONIN  --   --   --   --   --  2.14*  --    LACTATE  --   --   --  1.5 2.1*  --   --    PROTIME  --  18.0*  --   --   --   --  35.9*         Lab 12/23/23  0506 12/21/23  0817 12/21/23  0356 12/20/23  1548 12/20/23  0529 12/19/23  1819 12/19/23  0809 12/18/23  1311   SODIUM 140  --  140  --  143  --  143 140   POTASSIUM 4.2 4.3 3.7 3.2* 2.9*   < > 1.9* 2.2*   CHLORIDE 111*  --  112*  --  113*  --  111* 104   CO2 23.0  --  15.0*  --  16.0*  --  15.0* 11.0*   ANION GAP 6.0  --  13.0  --  14.0  --  17.0* 25.0*   BUN 17  --  21  --  29*  --  41* 41*   CREATININE 0.70  --  0.97  --  1.06*  --  1.52* 2.14*   EGFR 87.0  --  58.8*  --  52.9*  --  34.3* 22.8*   GLUCOSE 99  --  125*  --  100*  --  123* 104*   CALCIUM 9.3  --  9.2  --  9.0  --  7.9* 7.7*   MAGNESIUM  --   --  2.0  --   --   --   --  1.7    < > = values in this interval not displayed.         Lab 12/18/23  1311   TOTAL PROTEIN 6.2   ALBUMIN 2.5*   GLOBULIN 3.7   ALT (SGPT) <5   AST (SGOT) 10   BILIRUBIN <0.2   ALK PHOS 76         Lab 12/20/23  0529 12/18/23  1226   PROTIME 18.0* 35.9*   INR 1.47* 3.52*                 Brief Urine Lab Results  (Last result  in the past 365 days)        Color   Clarity   Blood   Leuk Est   Nitrite   Protein   CREAT   Urine HCG        12/18/23 1306 Yellow   Turbid   Negative   Large (3+)   Negative   100 mg/dL (2+)                 Microbiology Results (last 10 days)       Procedure Component Value - Date/Time    Clostridioides difficile Toxin - Stool, Per Rectum [[id number]]  (Abnormal) Collected: 12/19/23 0425    Lab Status: Final result Specimen: Stool from Per Rectum Updated: 12/19/23 0636    Narrative:      The following orders were created for panel order Clostridioides difficile Toxin - Stool, Per Rectum.  Procedure                               Abnormality         Status                     ---------                               -----------         ------                     Clostridioides difficile...[[id number]]  Abnormal            Final result                 Please view results for these tests on the individual orders.    Clostridioides difficile Toxin, PCR - Stool, Per Rectum [[id number]]  (Abnormal) Collected: 12/19/23 0425    Lab Status: Final result Specimen: Stool from Per Rectum Updated: 12/19/23 0636     Toxigenic C. difficile by PCR Positive    Narrative:      DNA from a toxigenic strain of C.difficile has been detected. Antigen testing for the presence of free C.difficile toxin is currently in progress, to help determine the clinical significance of this PCR result.     Clostridioides difficile toxin Ag, Reflex - Stool, Per Rectum [[id number]]  (Normal) Collected: 12/19/23 0425    Lab Status: Final result Specimen: Stool from Per Rectum Updated: 12/19/23 0637     C.diff Toxin Ag Negative    Narrative:      DNA from a toxigenic strain of C.difficile was detected, although the free toxin itself was not detected. These findings are consistent with C.difficile colonization and may not reflect actual C.difficile infection. Clinical correlation needed.    Blood Culture - Blood, Arm, Right [[id number]]  (Normal) Collected:  12/18/23 1524    Lab Status: Final result Specimen: Blood from Arm, Right Updated: 12/23/23 1617     Blood Culture No growth at 5 days    Blood Culture - Blood, Arm, Left [[id number]]  (Normal) Collected: 12/18/23 1524    Lab Status: Final result Specimen: Blood from Arm, Left Updated: 12/23/23 1617     Blood Culture No growth at 5 days    Urine Culture - Urine, Straight Cath [[id number]] Collected: 12/18/23 1306    Lab Status: Final result Specimen: Urine from Straight Cath Updated: 12/19/23 0901     Urine Culture >100,000 CFU/mL Mixed Lupe Isolated    Narrative:      Specimen contains mixed organisms of questionable pathogenicity suggestive of contamination. If symptoms persist, suggest recollection.  Colonization of the urinary tract without infection is common. Treatment is discouraged unless the patient is symptomatic, pregnant, or undergoing an invasive urologic procedure.            Hospital Course:   Ms. Gomes is an 83-year-old female who presented to [facility] on 12/18 for worsening confusion and fall.  She is on Aricept but there is no diagnosis of dementia and patient and her  deny this.  Of note, she was admitted to our hospital in November 2023 for acute urinary tract infection with bacteremia due to Enterococcus.  She was discharged home on Augmentin.  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  Reportedly patient fell so she was brought to the emergency department.  She has had poor oral intake and may have had some diarrhea.  In the ED, urinalysis suggestive of urinary tract infection.  Creatinine elevated at 2.14 consistent with acute kidney injury as well as hypokalemia with potassium 2.2.  CT head showed no acute findings.  Pelvis x-ray showed no acute findings.  Chest x-ray showed no acute findings.  1 L fluid bolus given in ED.    She was treated for acute urinary tract infection.  Urine culture in November showed E. coli only resistant to Levaquin.  Blood culture no growth  to date.  Urine culture shows mixed irina.  She completed 3-day course of ceftriaxone and cefdinir.  No further dysuria, frequency, hesitancy noted.  She has remained afebrile.    Serum potassium was originally low.  Potassium supplementation given and serum potassium stabilized.    Creatinine on 11/8/2023 was 1.6.  Patient arrived with an acute kidney injury with a creatinine of 2.14.  After treatment of her UTI and IV fluids, creatinine improved to 0.7 with a GFR of 87.    Prior to arrival, patient had recently been treated with Augmentin.  She presented with diarrhea.  C. difficile PCR positive.  She was initiated on oral vancomycin.  Diarrhea improved and ultimately resolved.  Patient denies abdominal cramping at this time.  Plan to discharge on oral vancomycin for an additional 5 days which would make a total of 10-day treatment.    Hemoglobin 10.1 on admission.  She has received IV fluid.  No signs of bleeding.  During last hospitalization hemoglobin was down to 6.5. Patient refused blood transfusion.  Hemoglobin was 7.3 at time of discharge.  Reviewed prior records of colonoscopy and EGD.  She has been found with diverticulosis.  She also had shown ulcer with no stigmata of bleeding on EGD. Patient was started on Retacrit and appointment made for patient to see hematology on 1/10/2024. Hemoglobin 9.2 on 12/22, no signs or symptoms of bleeding.    Patient was evaluated by physical and Occupational Therapy.  They recommend SNF placement.  Originally, patient and daughter were agreeable to this.  However, patient has made improvement with ambulation and after being given this option, patient and daughter Lola wish to discharge home.  They have no further concerns or questions and tell me they have all needed DME at home.  Home health physical and Occupational Therapy to be ordered at discharge.    Complete oral vancomycin course  Recommend follow-up with PCP in 1 week     Physical Exam on Discharge:  /50  "(BP Location: Right arm, Patient Position: Lying)   Pulse 71   Temp 97.8 °F (36.6 °C) (Oral)   Resp 16   Ht 157.2 cm (61.89\")   Wt 65.3 kg (144 lb)   LMP  (LMP Unknown)   SpO2 93%   BMI 26.43 kg/m²   Physical Exam  Vitals reviewed.   Constitutional:       General: She is not in acute distress.     Appearance: She is not toxic-appearing.      Comments: Up in bed, room air, no acute distress, no visitors at bedside   HENT:      Head: Normocephalic and atraumatic.      Mouth/Throat:      Mouth: Mucous membranes are moist.      Pharynx: Oropharynx is clear.   Eyes:      Extraocular Movements: Extraocular movements intact.      Conjunctiva/sclera: Conjunctivae normal.      Pupils: Pupils are equal, round, and reactive to light.   Cardiovascular:      Rate and Rhythm: Normal rate and regular rhythm.      Pulses: Normal pulses.   Pulmonary:      Effort: Pulmonary effort is normal. No respiratory distress.      Breath sounds: Normal breath sounds. No wheezing or rhonchi.   Abdominal:      General: Bowel sounds are normal. There is no distension.      Palpations: Abdomen is soft.      Tenderness: There is no abdominal tenderness.   Musculoskeletal:         General: No swelling or tenderness. Normal range of motion.      Cervical back: Normal range of motion and neck supple. No muscular tenderness.   Skin:     General: Skin is warm and dry.      Findings: No erythema or rash.   Neurological:      General: No focal deficit present.      Mental Status: She is alert. Mental status is at baseline.      Cranial Nerves: No cranial nerve deficit.      Motor: No weakness.   Psychiatric:         Mood and Affect: Mood normal.         Behavior: Behavior normal.     Condition on Discharge: Stable    [place] [facility] [place] Medications:     Discharge Medications        New Medications        Instructions Start Date   vancomycin 125 MG capsule  Commonly known as: VANCOCIN   125 mg, Oral, Every 6 " Hours Scheduled             Continue These Medications        Instructions Start Date   acetaminophen 325 MG tablet  Commonly known as: TYLENOL   650 mg, Oral, Every 6 Hours PRN      atorvastatin 40 MG tablet  Commonly known as: LIPITOR   40 mg, Oral, Daily      butalbital-acetaminophen-caffeine -40 MG per tablet  Commonly known as: FIORICET, ESGIC   1 tablet, Oral, 2 Times Daily PRN      carvedilol 12.5 MG tablet  Commonly known as: COREG   12.5 mg, Oral, 2 Times Daily With Meals      colestipol 1 g tablet  Commonly known as: COLESTID   1 g, Oral, 2 Times Daily      cyclobenzaprine 10 MG tablet  Commonly known as: FLEXERIL   10 mg, Oral, 2 Times Daily      donepezil 10 MG tablet  Commonly known as: ARICEPT   10 mg, Oral, Nightly      ergocalciferol 1.25 MG (46148 UT) capsule  Commonly known as: ERGOCALCIFEROL   50,000 Units, Oral, Weekly, Saturday      levothyroxine 50 MCG tablet  Commonly known as: SYNTHROID, LEVOTHROID   50 mcg, Oral, Daily      naloxone 4 MG/0.1ML nasal spray  Commonly known as: NARCAN   1 spray, Nasal, As Needed      oxyCODONE ER 15 MG tablet extended-release 12 hour  Commonly known as: oxyCONTIN   15 mg, Oral, 4 Times Daily      sertraline 25 MG tablet  Commonly known as: ZOLOFT   25 mg, Oral, Daily      SUMAtriptan 50 MG tablet  Commonly known as: IMITREX   50 mg, Oral, 2 Times Daily PRN      venlafaxine 25 MG tablet  Commonly known as: EFFEXOR   25 mg, Oral, Daily             Discharge Diet:   Diet Instructions       Diet: Regular/House Diet; Soft to Chew (NDD 3); Ground Meat; Thin (IDDSI 0)      Discharge Diet: Regular/House Diet    Texture: Soft to Chew (NDD 3)    Soft to Chew: Ground Meat    Fluid Consistency: Thin (IDDSI 0)            Activity at Discharge:   Activity Instructions       Up WIth Assist              Follow-up Appointments:   Future Appointments   Date Time Provider Department Center   1/10/2024 12:30 PM  [facility]   1/10/2024  1:00 PM  Daiana Avitia APRN [facility]       Test Results Pending at Discharge: None    Electronically signed by MORGAN Shields, 12/24/23, 09:31 CST.    Time: 35 minutes.          Electronically signed by Rogers Lloyd APRN at 12/24/23 0931

## 2023-12-24 NOTE — THERAPY DISCHARGE NOTE
Acute Care - Physical Therapy Discharge Summary  [facility]       Patient Name: Jennifer Gomes  : 1940  MRN: [id number]    Today's Date: 2023                 Admit Date: 2023      PT Recommendation and Plan    Visit Dx:    ICD-10-CM ICD-9-CM   1. Acute renal failure, unspecified acute renal failure type  N17.9 584.9   2. Hypokalemia  E87.6 276.8   3. Urinary tract infection without hematuria, site unspecified  N39.0 599.0   4. Altered mental status, unspecified altered mental status type  R41.82 780.97   5. Impaired mobility [Z74.09]  Z74.09 799.89   6. Impaired mobility and ADLs [Z74.09, Z78.9]  Z74.09 V49.89    Z78.9    7. Colitis  K52.9 558.9        Outcome Measures       Row Name 23 1300 23 1128 23 1425       How much help from another person do you currently need...    Turning from your back to your side while in flat bed without using bedrails? 4  -CAITLYN 3  -OUSMANE 3  -OUSMANE    Moving from lying on back to sitting on the side of a flat bed without bedrails? 4  -CAITLYN 3  -OUSMANE 3  -OUSMANE    Moving to and from a bed to a chair (including a wheelchair)? 3  -CAITLYN 3  -OUSMANE 3  -OUSMANE    Standing up from a chair using your arms (e.g., wheelchair, bedside chair)? 3  -CAITLYN 3  -OUSMANE 3  -OUSMANE    Climbing 3-5 steps with a railing? 3  -CAITLYN 2  -OUSMANE 1  -OUSMANE    To walk in hospital room? 3  -CAITLYN 3  -OUSMANE 3  -OUSMANE    AM-PAC 6 Clicks Score (PT) 20  -CAITLYN 17  -OUSMANE 16  -OUSMANE    Highest Level of Mobility Goal 6 --> Walk 10 steps or more  -CAITLYN 5 --> Static standing  -OUSMANE 5 --> Static standing  -OUSMANE       Functional Assessment    Outcome Measure Options -- AM-PAC 6 Clicks Basic Mobility (PT)  -OUSMANE AM-PAC 6 Clicks Basic Mobility (PT)  -OUSMANE              User Key  (r) = Recorded By, (t) = Taken By, (c) = Cosigned By      Initials Name Provider Type    Daniella Carter, PTA Physical Therapist Assistant    Sadi Sunshine, PTA Physical Therapist Assistant                         PT Rehab Goals       Row Name 23 1100              Transfer Goal 1 (PT)    [place] Level/Cues Needed (Transfer Goal 1, PT) contact guard required  Goal: SBA  -CAITLYN      Progress/Outcome (Transfer Goal 1, PT) goal not met  -CAITLYN         Gait Training Goal 1 (PT)    [place] Level (Gait Training Goal 1, PT) minimum assist (75% or more patient effort)  Goal: CGA  -CAITLYN      Distance (Gait Training Goal 1, PT) 18  Goal:40  -CAITLYN      Progress/Outcome (Gait Training Goal 1, PT) goal not met  -CAITLYN                User Key  (r) = Recorded By, (t) = Taken By, (c) = Cosigned By      Initials Name Provider Type Discipline    Daniella Carter PTA Physical Therapist Assistant PT                    Therapy Charges for Today       Code Description Service Date Service Provider Modifiers Qty    81598225591 HC GAIT TRAINING EA 15 MIN 12/23/2023 Daniella Samuels PTA GP 1    [id number]  PT THER PROC EA 15 MIN 12/23/2023 Daniella Samuels PTA GP 1            PT Discharge Summary  Anticipated Discharge Disposition (PT): home with assist  Reason for Discharge: Discharge from facility  Outcomes Achieved: Unable to make functional progress toward goals at this time  Discharge Destination: Home with assist      Daniella Samuels PTA   12/24/2023

## 2023-12-24 NOTE — PLAN OF CARE
Goal Outcome Evaluation: Outcome Evaluation: VSS, on RA, c/o of buttock pain, prn pain med given, sleeping between care, incont of urine with brief in place for security. No BM. bed alarm in use, possible discharge home today with family, safety maintained.

## 2023-12-24 NOTE — THERAPY DISCHARGE NOTE
Acute Care - Occupational Therapy Discharge Summary  [facility]     Patient Name: Jennifer Gomes  : 1940  MRN: [id number]    Today's Date: 2023                 Admit Date: 2023        OT Recommendation and Plan    Visit Dx:    ICD-10-CM ICD-9-CM   1. Acute renal failure, unspecified acute renal failure type  N17.9 584.9   2. Hypokalemia  E87.6 276.8   3. Urinary tract infection without hematuria, site unspecified  N39.0 599.0   4. Altered mental status, unspecified altered mental status type  R41.82 780.97   5. Impaired mobility [Z74.09]  Z74.09 799.89   6. Impaired mobility and ADLs [Z74.09, Z78.9]  Z74.09 V49.89    Z78.9    7. Colitis  K52.9 558.9                OT Rehab Goals       Row Name 23 1402             Dressing Goal 1 (OT)    Activity/Device (Dressing Goal 1, OT) dressing skills, all  -MT      [place]/Cues Needed (Dressing Goal 1, OT) supervision required;set-up required  -MT      Time Frame (Dressing Goal 1, OT) long term goal (LTG);by discharge  -MT      Progress/Outcome (Dressing Goal 1, OT) goal not met  -MT         Toileting Goal 1 (OT)    Activity/Device (Toileting Goal 1, OT) toileting skills, all  -MT      [place] Level/Cues Needed (Toileting Goal 1, OT) supervision required;set-up required  -MT      Time Frame (Toileting Goal 1, OT) long term goal (LTG);by discharge  -MT      Progress/Outcome (Toileting Goal 1, OT) goal not met  -MT         Grooming Goal 1 (OT)    Activity/Device (Grooming Goal 1, OT) grooming skills, all  -MT      [place] (Grooming Goal 1, OT) modified independence;set-up required  -MT      Time Frame (Grooming Goal 1, OT) long term goal (LTG);by discharge  -MT      Progress/Outcome (Grooming Goal 1, OT) goal not met  -MT                User Key  (r) = Recorded By, (t) = Taken By, (c) = Cosigned By      Initials Name Provider Type Discipline    MT Sandra Jolley COTA Occupational Therapist Assistant OT                     Outcome  Measures       Row Name 12/23/23 1300 12/22/23 1128 12/21/23 1425       How much help from another person do you currently need...    Turning from your back to your side while in flat bed without using bedrails? 4  -CAITLYN 3  -OUSMANE 3  -OUSMANE    Moving from lying on back to sitting on the side of a flat bed without bedrails? 4  -CAITLYN 3  -OUSMANE 3  -OUSMANE    Moving to and from a bed to a chair (including a wheelchair)? 3  -CAITLYN 3  -OUSMANE 3  -OUSMANE    Standing up from a chair using your arms (e.g., wheelchair, bedside chair)? 3  -CAITLYN 3  -OUSMANE 3  -OUSMANE    Climbing 3-5 steps with a railing? 3  -CAITLYN 2  -OUSMANE 1  -OUSMANE    To walk in hospital room? 3  -CAITLYN 3  -OUSMANE 3  -OUSMANE    AM-PAC 6 Clicks Score (PT) 20  -CAITLYN 17  -OUSMANE 16  -OUSMANE    Highest Level of Mobility Goal 6 --> Walk 10 steps or more  -CAITLYN 5 --> Static standing  -OUSMANE 5 --> Static standing  -OUSMANE       Functional Assessment    Outcome Measure Options -- AM-PAC 6 Clicks Basic Mobility (PT)  -OUSMANE AM-PAC 6 Clicks Basic Mobility (PT)  -OUSMANE              User Key  (r) = Recorded By, (t) = Taken By, (c) = Cosigned By      Initials Name Provider Type    Daniella Carter, PTA Physical Therapist Assistant    Sadi Sunshine, CHANDLER Physical Therapist Assistant                    Timed Therapy Charges  Total Units: 2      Suggested Charges  Total Units: 2      Procedure Name Documented Minutes Units Code    HC OT SELF CARE/MGMT/TRAIN EA 15 MIN 24 2   85139 (CPT®)                 Documented Minutes  Total Minutes: 24      Therapy Provided Minutes    16166 - OT Self Care/Mgmt Minutes 24                        OT Discharge Summary  Anticipated Discharge Disposition (OT): skilled nursing facility  Reason for Discharge: Discharge from facility  Outcomes Achieved: Refer to plan of care for updates on goals achieved  Discharge Destination: Home with home health      ZAKIA Garibay  12/24/2023

## 2023-12-24 NOTE — PAYOR COMM NOTE
"[facility] 12-24-23        Jennifer Soliman (83 y.o. Female)       Date of Birth   1940    Social Security Number       Address   [address] [facility] [address]    Home Phone   [phone]    MRN   [id number]       [employer]   [employer]    Marital Status                               Admission Date   12/18/23    Admission Type   Emergency    Admitting Provider   Cory Tirado MD    Attending Provider       Department, Room/Bed   [facility] 4B, 444/1       Discharge Date   12/24/2023    Discharge Disposition   [facility]    Discharge Destination                                 Attending Provider: (none)   Allergies: Ropinirole Hcl, Codeine, Definity [Perflutren Lipid Microsphere], Ambien [Zolpidem], Eszopiclone, Pregabalin, Tizanidine    Isolation: Spore   Infection: C.difficile (12/19/23)   Code Status: CPR    Ht: 157.2 cm (61.89\")   Wt: 65.3 kg (144 lb)    Admission Cmt: None   Principal Problem: Hypokalemia [E87.6]                   Active Insurance as of 12/18/2023       Primary Coverage       Payor Plan Insurance Group Employer/Plan Group    [facility] MEDICARE REPLACEMENT [facility] MED ADV GROUP [id number]       Payor Plan Address Payor Plan Phone Number Payor Plan Fax Number Effective Dates    [address] [address]   1/1/2023 - None Entered    [facility] [id number]         Subscriber Name Subscriber Birth Date Member ID       JENNIFER SOLIMAN 1940 [id number]                     Emergency Contacts        (Rel.) Home Phone Work Phone Mobile Phone    SolimanAdams harry (Spouse) [phone] -- [phone]    Ivonne Liang (Daughter) [phone] -- --                 Discharge Summary        Rogers Lloyd APRN at 12/24/23 0839                [facility]  DISCHARGE SUMMARY       Date of Admission: 12/18/2023  Date of Discharge:  12/24/2023  Primary Care Physician: Olivia Mora APRN    Presenting " Problem/History of Present Illness:  Fall, confusion, hypokalemia, TOMÁS    Final Discharge Diagnoses:  Active Hospital Problems    Diagnosis     **Hypokalemia     UTI (urinary tract infection)     Toxic metabolic encephalopathy     Acute renal failure superimposed on stage 3 chronic kidney disease        Consults: None    Procedures Performed: None    Pertinent Test Results:   Results for orders placed during the hospital encounter of 09/04/23    Adult Transthoracic Echo Complete W/ Cont if Necessary Per Protocol    Interpretation Summary    Left ventricular systolic function is normal. Left ventricular ejection fraction appears to be 56 - 60%.    Left ventricular wall thickness is consistent with moderate concentric hypertrophy.    Left ventricular diastolic function is consistent with (grade Ia w/high LAP) impaired relaxation.    The left atrial cavity is moderate to severely dilated.    Left atrial volume is moderately increased.    The right atrial cavity is borderline dilated.    Estimated right ventricular systolic pressure from tricuspid regurgitation is mildly elevated (35-45 mmHg).      Imaging Results (All)       Procedure Component Value Units Date/Time    CT Head Without Contrast [[id number]] Collected: 12/18/23 1355     Updated: 12/18/23 1401    Narrative:      Exam: CT HEAD WO CONTRAST- 12/18/2023 12:41 PM     HISTORY: Altered mental status     DOSE LENGTH PRODUCT: 804 mGy cm. Automated exposure control was also  utilized to decrease patient radiation dose.     Technique:  Helically acquired CT of the brain without IV contrast was performed.  Sagittal and coronal reformations are also provided for review. Soft  tissue and bone kernels are available for interpretation.     Comparison: 11/5/2023.     Findings:     Ventricles and extra-axial CSF spaces are mildly proportionally  prominent, likely due to atrophy.     No intraparenchymal or extra-axial hemorrhage.     Gray-white matter differentiation is  preserved. Moderate periventricular  white matter low-attenuation.     Orbits are grossly unremarkable. Paranasal sinuses are clear. Mastoid  air cells are clear.     No suspicious calvarial or extracranial soft tissue abnormality.     Other:None.       Impression:      Impression:       No acute intracranial abnormality.     Stable atrophy and chronic small vessel ischemic changes.     This report was signed and finalized on 12/18/2023 1:58 PM by Sunny Degroot.       XR Pelvis 1 or 2 View [[id number]] Collected: 12/18/23 1331     Updated: 12/18/23 1335    Narrative:      EXAMINATION: XR PELVIS 1 OR 2 VW-  12/18/2023 1:31 PM     HISTORY: Pelvic pain.     FINDINGS: AP radiograph of the pelvis demonstrates no acute fracture or  dislocation. The femoral heads are concentric and well located within  the acetabulum. The SI joints and pubic symphysis are intact with no  evidence of diastases.       Impression:      1. No acute fracture.     This report was signed and finalized on 12/18/2023 1:32 PM by Dr. Chi Hinds MD.       XR Chest 1 View [[id number]] Collected: 12/18/23 1237     Updated: 12/18/23 1241    Narrative:      XR CHEST 1 VW- 12/18/2023 11:23 AM     HISTORY: ams       COMPARISON: 11/5/2023     FINDINGS:  Upright frontal radiograph of the chest was obtained     Chronic elevation of the right hemidiaphragm. No lung consolidation. No  pleural effusion or pneumothorax. The cardiomediastinal silhouette and  pulmonary vascularity are within normal limits. The osseous structures  and surrounding soft tissues demonstrate no acute abnormality.       Impression:      1.  Stable chest exam without acute process. No acute infiltrate.     This report was signed and finalized on 12/18/2023 12:38 PM by Dr Tenzin Madrigal.             LAB RESULTS:      Lab 12/21/23  0356 12/20/23  0529 12/19/23  0809 12/18/23  1813 12/18/23  1524 12/18/23  1311 12/18/23  1226   WBC 4.98 8.07 11.02*  --   --   --  22.67*   HEMOGLOBIN  9.2* 8.3* 7.5*  --   --   --  10.1*   HEMATOCRIT 32.3* 27.1* 24.1*  --   --   --  32.6*   PLATELETS 172 179 172  --   --   --  223   NEUTROS ABS 3.65 6.02 9.61*  --   --   --  19.82*   IMMATURE GRANS (ABS)  --  0.04 0.06*  --   --   --  0.16*   LYMPHS ABS  --  1.30 0.78  --   --   --  1.54   MONOS ABS  --  0.55 0.52  --   --   --  1.09*   EOS ABS 0.20 0.14 0.04  --   --   --  0.02   MCV 99.1* 92.5 90.6  --   --   --  93.4   PROCALCITONIN  --   --   --   --   --  2.14*  --    LACTATE  --   --   --  1.5 2.1*  --   --    PROTIME  --  18.0*  --   --   --   --  35.9*         Lab 12/23/23  0506 12/21/23  0817 12/21/23  0356 12/20/23  1548 12/20/23  0529 12/19/23  1819 12/19/23  0809 12/18/23  1311   SODIUM 140  --  140  --  143  --  143 140   POTASSIUM 4.2 4.3 3.7 3.2* 2.9*   < > 1.9* 2.2*   CHLORIDE 111*  --  112*  --  113*  --  111* 104   CO2 23.0  --  15.0*  --  16.0*  --  15.0* 11.0*   ANION GAP 6.0  --  13.0  --  14.0  --  17.0* 25.0*   BUN 17  --  21  --  29*  --  41* 41*   CREATININE 0.70  --  0.97  --  1.06*  --  1.52* 2.14*   EGFR 87.0  --  58.8*  --  52.9*  --  34.3* 22.8*   GLUCOSE 99  --  125*  --  100*  --  123* 104*   CALCIUM 9.3  --  9.2  --  9.0  --  7.9* 7.7*   MAGNESIUM  --   --  2.0  --   --   --   --  1.7    < > = values in this interval not displayed.         Lab 12/18/23  1311   TOTAL PROTEIN 6.2   ALBUMIN 2.5*   GLOBULIN 3.7   ALT (SGPT) <5   AST (SGOT) 10   BILIRUBIN <0.2   ALK PHOS 76         Lab 12/20/23  0529 12/18/23  1226   PROTIME 18.0* 35.9*   INR 1.47* 3.52*                 Brief Urine Lab Results  (Last result in the past 365 days)        Color   Clarity   Blood   Leuk Est   Nitrite   Protein   CREAT   Urine HCG        12/18/23 1306 Yellow   Turbid   Negative   Large (3+)   Negative   100 mg/dL (2+)                 Microbiology Results (last 10 days)       Procedure Component Value - Date/Time    Clostridioides difficile Toxin - Stool, Per Rectum [[id number]]  (Abnormal) Collected: 12/19/23  0425    Lab Status: Final result Specimen: Stool from Per Rectum Updated: 12/19/23 0636    Narrative:      The following orders were created for panel order Clostridioides difficile Toxin - Stool, Per Rectum.  Procedure                               Abnormality         Status                     ---------                               -----------         ------                     Clostridioides difficile...[[id number]]  Abnormal            Final result                 Please view results for these tests on the individual orders.    Clostridioides difficile Toxin, PCR - Stool, Per Rectum [[id number]]  (Abnormal) Collected: 12/19/23 0425    Lab Status: Final result Specimen: Stool from Per Rectum Updated: 12/19/23 0636     Toxigenic C. difficile by PCR Positive    Narrative:      DNA from a toxigenic strain of C.difficile has been detected. Antigen testing for the presence of free C.difficile toxin is currently in progress, to help determine the clinical significance of this PCR result.     Clostridioides difficile toxin Ag, Reflex - Stool, Per Rectum [[id number]]  (Normal) Collected: 12/19/23 0425    Lab Status: Final result Specimen: Stool from Per Rectum Updated: 12/19/23 0637     C.diff Toxin Ag Negative    Narrative:      DNA from a toxigenic strain of C.difficile was detected, although the free toxin itself was not detected. These findings are consistent with C.difficile colonization and may not reflect actual C.difficile infection. Clinical correlation needed.    Blood Culture - Blood, Arm, Right [[id number]]  (Normal) Collected: 12/18/23 1524    Lab Status: Final result Specimen: Blood from Arm, Right Updated: 12/23/23 1617     Blood Culture No growth at 5 days    Blood Culture - Blood, Arm, Left [[id number]]  (Normal) Collected: 12/18/23 1524    Lab Status: Final result Specimen: Blood from Arm, Left Updated: 12/23/23 1617     Blood Culture No growth at 5 days    Urine Culture - Urine, Straight Cath [[id number]]  Collected: 12/18/23 1306    Lab Status: Final result Specimen: Urine from Straight Cath Updated: 12/19/23 0901     Urine Culture >100,000 CFU/mL Mixed Irina Isolated    Narrative:      Specimen contains mixed organisms of questionable pathogenicity suggestive of contamination. If symptoms persist, suggest recollection.  Colonization of the urinary tract without infection is common. Treatment is discouraged unless the patient is symptomatic, pregnant, or undergoing an invasive urologic procedure.            Hospital Course:   Ms. Gomes is an 83-year-old female who presented to [facility] on 12/18 for worsening confusion and fall.  She is on Aricept but there is no diagnosis of dementia and patient and her  deny this.  Of note, she was admitted to our hospital in November 2023 for acute urinary tract infection with bacteremia due to Enterococcus.  She was discharged home on Augmentin.  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  Reportedly patient fell so she was brought to the emergency department.  She has had poor oral intake and may have had some diarrhea.  In the ED, urinalysis suggestive of urinary tract infection.  Creatinine elevated at 2.14 consistent with acute kidney injury as well as hypokalemia with potassium 2.2.  CT head showed no acute findings.  Pelvis x-ray showed no acute findings.  Chest x-ray showed no acute findings.  1 L fluid bolus given in ED.    She was treated for acute urinary tract infection.  Urine culture in November showed E. coli only resistant to Levaquin.  Blood culture no growth to date.  Urine culture shows mixed irina.  She completed 3-day course of ceftriaxone and cefdinir.  No further dysuria, frequency, hesitancy noted.  She has remained afebrile.    Serum potassium was originally low.  Potassium supplementation given and serum potassium stabilized.    Creatinine on 11/8/2023 was 1.6.  Patient arrived with an acute kidney injury with a creatinine of 2.14.   "After treatment of her UTI and IV fluids, creatinine improved to 0.7 with a GFR of 87.    Prior to arrival, patient had recently been treated with Augmentin.  She presented with diarrhea.  C. difficile PCR positive.  She was initiated on oral vancomycin.  Diarrhea improved and ultimately resolved.  Patient denies abdominal cramping at this time.  Plan to discharge on oral vancomycin for an additional 5 days which would make a total of 10-day treatment.    Hemoglobin 10.1 on admission.  She has received IV fluid.  No signs of bleeding.  During last hospitalization hemoglobin was down to 6.5. Patient refused blood transfusion.  Hemoglobin was 7.3 at time of discharge.  Reviewed prior records of colonoscopy and EGD.  She has been found with diverticulosis.  She also had shown ulcer with no stigmata of bleeding on EGD. Patient was started on Retacrit and appointment made for patient to see hematology on 1/10/2024. Hemoglobin 9.2 on 12/22, no signs or symptoms of bleeding.    Patient was evaluated by physical and Occupational Therapy.  They recommend SNF placement.  Originally, patient and daughter were agreeable to this.  However, patient has made improvement with ambulation and after being given this option, patient and daughter Lola wish to discharge home.  They have no further concerns or questions and tell me they have all needed DME at home.  Home health physical and Occupational Therapy to be ordered at discharge.    Complete oral vancomycin course  Recommend follow-up with PCP in 1 week     Physical Exam on Discharge:  /50 (BP Location: Right arm, Patient Position: Lying)   Pulse 71   Temp 97.8 °F (36.6 °C) (Oral)   Resp 16   Ht 157.2 cm (61.89\")   Wt 65.3 kg (144 lb)   LMP  (LMP Unknown)   SpO2 93%   BMI 26.43 kg/m²   Physical Exam  Vitals reviewed.   Constitutional:       General: She is not in acute distress.     Appearance: She is not toxic-appearing.      Comments: Up in bed, room air, no " acute distress, no visitors at bedside   HENT:      Head: Normocephalic and atraumatic.      Mouth/Throat:      Mouth: Mucous membranes are moist.      Pharynx: Oropharynx is clear.   Eyes:      Extraocular Movements: Extraocular movements intact.      Conjunctiva/sclera: Conjunctivae normal.      Pupils: Pupils are equal, round, and reactive to light.   Cardiovascular:      Rate and Rhythm: Normal rate and regular rhythm.      Pulses: Normal pulses.   Pulmonary:      Effort: Pulmonary effort is normal. No respiratory distress.      Breath sounds: Normal breath sounds. No wheezing or rhonchi.   Abdominal:      General: Bowel sounds are normal. There is no distension.      Palpations: Abdomen is soft.      Tenderness: There is no abdominal tenderness.   Musculoskeletal:         General: No swelling or tenderness. Normal range of motion.      Cervical back: Normal range of motion and neck supple. No muscular tenderness.   Skin:     General: Skin is warm and dry.      Findings: No erythema or rash.   Neurological:      General: No focal deficit present.      Mental Status: She is alert. Mental status is at baseline.      Cranial Nerves: No cranial nerve deficit.      Motor: No weakness.   Psychiatric:         Mood and Affect: Mood normal.         Behavior: Behavior normal.     Condition on Discharge: Stable    Discharge Disposition:  [facility] [place] [facility] [place]    Discharge Medications:     Discharge Medications        New Medications        Instructions Start Date   vancomycin 125 MG capsule  Commonly known as: VANCOCIN   125 mg, Oral, Every 6 Hours Scheduled             Continue These Medications        Instructions Start Date   acetaminophen 325 MG tablet  Commonly known as: TYLENOL   650 mg, Oral, Every 6 Hours PRN      atorvastatin 40 MG tablet  Commonly known as: LIPITOR   40 mg, Oral, Daily      butalbital-acetaminophen-caffeine -40 MG per tablet  Commonly known as: FIORICET, ESGIC   1 tablet, Oral, 2 Times Daily  PRN      carvedilol 12.5 MG tablet  Commonly known as: COREG   12.5 mg, Oral, 2 Times Daily With Meals      colestipol 1 g tablet  Commonly known as: COLESTID   1 g, Oral, 2 Times Daily      cyclobenzaprine 10 MG tablet  Commonly known as: FLEXERIL   10 mg, Oral, 2 Times Daily      donepezil 10 MG tablet  Commonly known as: ARICEPT   10 mg, Oral, Nightly      ergocalciferol 1.25 MG (62967 UT) capsule  Commonly known as: ERGOCALCIFEROL   50,000 Units, Oral, Weekly, Saturday      levothyroxine 50 MCG tablet  Commonly known as: SYNTHROID, LEVOTHROID   50 mcg, Oral, Daily      naloxone 4 MG/0.1ML nasal spray  Commonly known as: NARCAN   1 spray, Nasal, As Needed      oxyCODONE ER 15 MG tablet extended-release 12 hour  Commonly known as: oxyCONTIN   15 mg, Oral, 4 Times Daily      sertraline 25 MG tablet  Commonly known as: ZOLOFT   25 mg, Oral, Daily      SUMAtriptan 50 MG tablet  Commonly known as: IMITREX   50 mg, Oral, 2 Times Daily PRN      venlafaxine 25 MG tablet  Commonly known as: EFFEXOR   25 mg, Oral, Daily             Discharge Diet:   Diet Instructions       Diet: Regular/House Diet; Soft to Chew (NDD 3); Ground Meat; Thin (IDDSI 0)      Discharge Diet: Regular/House Diet    Texture: Soft to Chew (NDD 3)    Soft to Chew: Ground Meat    Fluid Consistency: Thin (IDDSI 0)            Activity at Discharge:   Activity Instructions       Up WIth Assist              Follow-up Appointments:   Future Appointments   Date Time Provider Department Center   1/10/2024 12:30 PM  [facility]   1/10/2024  1:00 PM Daiana Avitia APRN [facility]       Test Results Pending at Discharge: None    Electronically signed by MORGAN Shields, 12/24/23, 09:31 CST.    Time: 35 minutes.          Electronically signed by Rogers Lloyd APRN at 12/24/23 0931

## 2023-12-25 NOTE — OUTREACH NOTE
Prep Survey      Flowsheet Row Responses   [facility] facility patient discharged from? [facility]   Is LACE score < 7 ? No   Eligibility Readm Mgmt   Discharge diagnosis *Hypokalemiaacute urinary tract infection.   Does the patient have one of the following disease processes/diagnoses(primary or secondary)? Other   Does the patient have Home health ordered? Yes   What is the Home health agency?  [facility]   Is there a DME ordered? No   Prep survey completed? Yes            ALDO RUSSO - Registered Nurse

## 2023-12-26 ENCOUNTER — TELEPHONE (OUTPATIENT)
Dept: INTERNAL MEDICINE CLINIC | Age: 83
End: 2023-12-26

## 2023-12-26 NOTE — TELEPHONE ENCOUNTER
[facility] has referral from [address] follow pt for [facility] orders ?

## 2023-12-26 NOTE — TELEPHONE ENCOUNTER
[facility] only sent therapy orders for this pt but they think she will need a nurse eval as well  - is it ok for them to add nursing  ?

## 2023-12-26 NOTE — TELEPHONE ENCOUNTER
[facility] called stating that they don't have enough staff to perform OT and would rewrite the order for next week.

## 2023-12-27 ENCOUNTER — TELEPHONE (OUTPATIENT)
Dept: INTERNAL MEDICINE | Age: 83
End: 2023-12-27

## 2023-12-27 ENCOUNTER — TELEPHONE (OUTPATIENT)
Dept: INTERNAL MEDICINE CLINIC | Age: 83
End: 2023-12-27

## 2023-12-27 NOTE — TELEPHONE ENCOUNTER
Care Transitions Initial Follow Up Call    Outreach made within 2 business days of discharge: Yes    Patient: Andrew Dumont   Patient : 1940   MRN: [id number]   Reason for Admission: Hypokalemia  Discharge Date:  23       Spoke with: Generic voicemail, unable to leave a message, first attempt. Discharge department/facility: [facility]      Follow Up  No future appointments.     Rajiv Hidalgo MA The patient is a 35y Male complaining of pain, back.

## 2023-12-27 NOTE — TELEPHONE ENCOUNTER
Pt requesting that [facility] admit her on Thursday , they were going to admit today but cg asked them to delay until Thurs   Is that ok with you ?

## 2023-12-28 ENCOUNTER — TELEPHONE (OUTPATIENT)
Dept: INTERNAL MEDICINE CLINIC | Age: 83
End: 2023-12-28

## 2023-12-28 ENCOUNTER — TELEPHONE (OUTPATIENT)
Dept: INTERNAL MEDICINE | Age: 83
End: 2023-12-28

## 2023-12-28 NOTE — TELEPHONE ENCOUNTER
[facility] PT [facility] completed and  requetsing POC for further PT 2 x/wk x 4 wks for ther. ex. for core and LE strengthening, transfer training, balance retraining, gait training and Pt. education regarding safety/fall prevention and HEP.     Please advise if PT visits are approved

## 2023-12-28 NOTE — TELEPHONE ENCOUNTER
Care Transitions Initial Follow Up Call    Outreach made within 2 business days of discharge: Yes    Patient: Clifton Reed   Patient : 1940   MRN: [id number]    Reason for Admission: Hypokalemia  Discharge Date: 23       Spoke with: Generic voicemail, unable to leave a message, Second attempt    Discharge department/facility: [facility]      Follow Up  No future appointments.     Clarisse García MA

## 2023-12-29 ENCOUNTER — TELEPHONE (OUTPATIENT)
Dept: INTERNAL MEDICINE CLINIC | Age: 83
End: 2023-12-29

## 2023-12-29 ENCOUNTER — TELEPHONE (OUTPATIENT)
Dept: INTERNAL MEDICINE | Age: 83
End: 2023-12-29

## 2023-12-29 NOTE — TELEPHONE ENCOUNTER
Care Transitions Initial Follow Up Call    Outreach made within 2 business days of discharge: Yes    Patient: Selene Swann   Patient : 1940   MRN: [id number]   Reason for Admission: Hypokalemia  Discharge Date:  23       Spoke with: Karen Alston    Discharge department/facility: [facility]    TCM Interactive Patient Contact:  Was patient able to fill all prescriptions: Yes  Was patient instructed to bring all medications to the follow-up visit: Yes  Is patient taking all medications as directed in the discharge summary? Yes  Does patient understand their discharge instructions: Yes  Does patient have questions or concerns that need addressed prior to 7-14 day follow up office visit: no    The patient reports she is having incontinence and constipation still. Her bladder and appetite are normal. The patient denies any heart palpitations, fatigue, muscle weakness, or tingling.      Scheduled appointment with PCP within 7-14 days    Follow Up  Future Appointments   Date Time Provider [address] [place] [address]   2024  2:30 PM TAMIA Luz JANNETHKY       Bárbara Rooney, [facility]

## 2023-12-29 NOTE — TELEPHONE ENCOUNTER
[facility] SN reporting   pts admission to [facility] services, poc 1w4. Not sure if she has called to schedule her pcp f/u appt but if she hasn't can they call her to remind her to schedule?        Please advise

## 2024-01-03 ENCOUNTER — OFFICE VISIT (OUTPATIENT)
Dept: INTERNAL MEDICINE | Age: 84
End: 2024-01-03

## 2024-01-03 VITALS
SYSTOLIC BLOOD PRESSURE: 113 MMHG | DIASTOLIC BLOOD PRESSURE: 61 MMHG | HEIGHT: 62 IN | BODY MASS INDEX: 33.65 KG/M2 | HEART RATE: 73 BPM | OXYGEN SATURATION: 93 %

## 2024-01-03 DIAGNOSIS — N18.31 STAGE 3A CHRONIC KIDNEY DISEASE (HCC): ICD-10-CM

## 2024-01-03 DIAGNOSIS — E55.9 VITAMIN D DEFICIENCY: Primary | ICD-10-CM

## 2024-01-03 DIAGNOSIS — E55.9 VITAMIN D DEFICIENCY: ICD-10-CM

## 2024-01-03 DIAGNOSIS — Z09 HOSPITAL DISCHARGE FOLLOW-UP: ICD-10-CM

## 2024-01-03 DIAGNOSIS — D50.8 OTHER IRON DEFICIENCY ANEMIA: Primary | ICD-10-CM

## 2024-01-03 LAB
25(OH)D3 SERPL-MCNC: 56.4 NG/ML
ALBUMIN SERPL-MCNC: 3.4 G/DL (ref 3.5–5.2)
ALP SERPL-CCNC: 225 U/L (ref 35–104)
ALT SERPL-CCNC: 178 U/L (ref 5–33)
ANION GAP SERPL CALCULATED.3IONS-SCNC: 8 MMOL/L (ref 7–19)
AST SERPL-CCNC: 107 U/L (ref 5–32)
BASOPHILS # BLD: 0.1 K/UL (ref 0–0.2)
BASOPHILS NFR BLD: 1 % (ref 0–1)
BILIRUB SERPL-MCNC: <0.2 MG/DL (ref 0.2–1.2)
BUN SERPL-MCNC: 40 MG/DL (ref 8–23)
CALCIUM SERPL-MCNC: 9.5 MG/DL (ref 8.8–10.2)
CHLORIDE SERPL-SCNC: 103 MMOL/L (ref 98–111)
CO2 SERPL-SCNC: 26 MMOL/L (ref 22–29)
CREAT SERPL-MCNC: 1.4 MG/DL (ref 0.5–0.9)
EOSINOPHIL # BLD: 0.2 K/UL (ref 0–0.6)
EOSINOPHIL NFR BLD: 4.9 % (ref 0–5)
ERYTHROCYTE [DISTWIDTH] IN BLOOD BY AUTOMATED COUNT: 14.5 % (ref 11.5–14.5)
GLUCOSE SERPL-MCNC: 105 MG/DL (ref 74–109)
HCT VFR BLD AUTO: 24.5 % (ref 37–47)
HGB BLD-MCNC: 7.4 G/DL (ref 12–16)
IMM GRANULOCYTES # BLD: 0 K/UL
LYMPHOCYTES # BLD: 1.5 K/UL (ref 1.1–4.5)
LYMPHOCYTES NFR BLD: 29.6 % (ref 20–40)
MCH RBC QN AUTO: 29.5 PG (ref 27–31)
MCHC RBC AUTO-ENTMCNC: 30.2 G/DL (ref 33–37)
MCV RBC AUTO: 97.6 FL (ref 81–99)
MONOCYTES # BLD: 0.5 K/UL (ref 0–0.9)
MONOCYTES NFR BLD: 9.6 % (ref 0–10)
NEUTROPHILS # BLD: 2.7 K/UL (ref 1.5–7.5)
NEUTS SEG NFR BLD: 54.5 % (ref 50–65)
PLATELET # BLD AUTO: 223 K/UL (ref 130–400)
PMV BLD AUTO: 11.1 FL (ref 9.4–12.3)
POTASSIUM SERPL-SCNC: 5.3 MMOL/L (ref 3.5–5)
PROT SERPL-MCNC: 6.8 G/DL (ref 6.6–8.7)
RBC # BLD AUTO: 2.51 M/UL (ref 4.2–5.4)
SODIUM SERPL-SCNC: 137 MMOL/L (ref 136–145)
WBC # BLD AUTO: 4.9 K/UL (ref 4.8–10.8)

## 2024-01-03 ASSESSMENT — PATIENT HEALTH QUESTIONNAIRE - PHQ9
SUM OF ALL RESPONSES TO PHQ9 QUESTIONS 1 & 2: 0
2. FEELING DOWN, DEPRESSED OR HOPELESS: 0
SUM OF ALL RESPONSES TO PHQ QUESTIONS 1-9: 0
7. TROUBLE CONCENTRATING ON THINGS, SUCH AS READING THE NEWSPAPER OR WATCHING TELEVISION: 0
SUM OF ALL RESPONSES TO PHQ QUESTIONS 1-9: 0
5. POOR APPETITE OR OVEREATING: 0
9. THOUGHTS THAT YOU WOULD BE BETTER OFF DEAD, OR OF HURTING YOURSELF: 0
6. FEELING BAD ABOUT YOURSELF - OR THAT YOU ARE A FAILURE OR HAVE LET YOURSELF OR YOUR FAMILY DOWN: 0
SUM OF ALL RESPONSES TO PHQ QUESTIONS 1-9: 0
10. IF YOU CHECKED OFF ANY PROBLEMS, HOW DIFFICULT HAVE THESE PROBLEMS MADE IT FOR YOU TO DO YOUR WORK, TAKE CARE OF THINGS AT HOME, OR GET ALONG WITH OTHER PEOPLE: 0
SUM OF ALL RESPONSES TO PHQ QUESTIONS 1-9: 0
8. MOVING OR SPEAKING SO SLOWLY THAT OTHER PEOPLE COULD HAVE NOTICED. OR THE OPPOSITE, BEING SO FIGETY OR RESTLESS THAT YOU HAVE BEEN MOVING AROUND A LOT MORE THAN USUAL: 0
4. FEELING TIRED OR HAVING LITTLE ENERGY: 0
1. LITTLE INTEREST OR PLEASURE IN DOING THINGS: 0
3. TROUBLE FALLING OR STAYING ASLEEP: 0

## 2024-01-03 ASSESSMENT — ENCOUNTER SYMPTOMS
CONSTIPATION: 0
DIARRHEA: 0
BACK PAIN: 1
COUGH: 0
ABDOMINAL DISTENTION: 0
COLOR CHANGE: 0
STRIDOR: 0
SORE THROAT: 0
EYE ITCHING: 0
CHOKING: 0
EYE DISCHARGE: 0
WHEEZING: 0
TROUBLE SWALLOWING: 0
ABDOMINAL PAIN: 0
BLOOD IN STOOL: 0
VOMITING: 0
NAUSEA: 0
SHORTNESS OF BREATH: 0

## 2024-01-03 NOTE — PROGRESS NOTES
Post-Discharge Transitional Care Follow Up    Brenda Cruz   YOB: 1940    Date of Office Visit:  1/3/2024  Date of Hospital Admission: 12/18/2023  Date of Hospital Discharge: 12/24/2023    Care management risk score Rising risk (score 2-5) and Complex Care (Scores >=6): No Risk Score On File     Non face to face  following discharge, date last encounter closed (first attempt may have been earlier): 12/29/2023     Call initiated 2 business days of discharge: Yes    ASSESSMENT/PLAN:   Vitamin D deficiency  -     Vitamin D 25 Hydroxy; Future  Stage 3a chronic kidney disease (HCC)  -     CBC with Auto Differential; Future  -     Comprehensive Metabolic Panel; Future  Hospital discharge follow-up  -     PA DISCHARGE MEDS RECONCILED W/ CURRENT OUTPATIENT MED LIST      Medical Decision Making: high complexity  Return in about 3 months (around 4/3/2024) for have labs done prior to appt.    On this date 1/3/2024 I have spent 45 minutes reviewing previous notes, test results and face to face with the patient discussing the diagnosis and importance of compliance with the treatment plan as well as documenting on the day of the visit.       Subjective:   HPI   Afr after having diarrhea and being confused  Hypokalemia on admission  was 2.2   UTI she is on vancomycin she is completed that  C. Diff given vancomycin   Confusion    Inpatient course: Discharge summary reviewed- see chart.\      Interval history/Current status:   Acute renal failure we will check labs today  2.  Hypokalemia she is on potassium at discharge it was normal we will recheck today  3.  UTI she does complain of antibiotics no reason to recheck at this point  4.  C. difficile vancomycin is completed diarrhea is resolved  5.  Confusion this is improved with treatment of the UTI  Patient Active Problem List   Diagnosis    Trochanteric bursitis of both hips    Spinal stenosis at L4-L5 level    Hypothyroidism (acquired)    Hypertension    Mixed 
            CONTINUE taking these medications      ALPRAZolam 0.5 MG tablet  Commonly known as: XANAX     atorvastatin 40 MG tablet  Commonly known as: LIPITOR  TAKE 1 TABLET DAILY AS DIRECTED     butalbital-acetaminophen-caffeine -40 MG per tablet  Commonly known as: FIORICET, ESGIC  Take 1 tablet by mouth every 8-12 hours as needed for Headaches Max Daily Amount: 3 tablets     carvedilol 12.5 MG tablet  Commonly known as: COREG  TAKE 1 TABLET TWICE A DAY     colestipol 1 g tablet  Commonly known as: Colestid  Take 1 tablet by mouth 2 times daily     cyclobenzaprine 10 MG tablet  Commonly known as: FLEXERIL  TAKE 1 TABLET BY MOUTH THREE TIMES DAILY AS NEEDED FOR MUSCLE SPASMS.     diclofenac 75 MG EC tablet  Commonly known as: VOLTAREN  TAKE 1 TABLET BY MOUTH 2 TIMES DAILY     donepezil 10 MG tablet  Commonly known as: ARICEPT  Take 1 tablet by mouth nightly     Hydrocortisone Acetate 1 % Oint  Apply 1 application topically 3 times daily     ipratropium 0.06 % nasal spray  Commonly known as: ATROVENT  INSTILL 2 SPRAYS INTO EACH NOSTRIL TWICE DAILY     lansoprazole 30 MG delayed release capsule  Commonly known as: PREVACID  1 BEFORE BREAKFAST     levothyroxine 50 MCG tablet  Commonly known as: SYNTHROID  TAKE 1 TABLET DAILY     metroNIDAZOLE 500 MG tablet  Commonly known as: FLAGYL     nystatin 856539 UNIT/GM cream  Commonly known as: MYCOSTATIN  Apply topically 2 times daily.     sertraline 25 MG tablet  Commonly known as: ZOLOFT  Take 1 tablet by mouth daily     solifenacin 10 MG tablet  Commonly known as: VESIcare  Take 1 tablet by mouth daily     SUMAtriptan 50 MG tablet  Commonly known as: IMITREX  TAKE 1 TABLET BY MOUTH TWICE DAILY AS NEEDED FOR MIGRAINE. MAX 2 TABLETS DAILY.     venlafaxine 25 MG tablet  Commonly known as: EFFEXOR  Take 1 tablet by mouth Daily     vitamin D 1.25 MG (83135 UT) Caps capsule  Commonly known as: ERGOCALCIFEROL  Take 1 capsule by mouth once a week                Medications

## 2024-01-04 ENCOUNTER — TELEPHONE (OUTPATIENT)
Dept: HOSPICE | Age: 84
End: 2024-01-04

## 2024-01-04 NOTE — TELEPHONE ENCOUNTER
Home Health OT saw patient on 1/2/24. Per OT: \"Pt has resumed adl routines including bathing in step in tub with setup/supervision from her daughter, using previously established techniques\" No further OT visits planned.     MARYLOU

## 2024-01-05 ENCOUNTER — READMISSION MANAGEMENT (OUTPATIENT)
Dept: CALL CENTER | Facility: HOSPITAL | Age: 84
End: 2024-01-05
Payer: MEDICARE

## 2024-01-05 LAB
ALBUMIN SERPL-MCNC: 3.2 G/DL (ref 3.5–5.2)
ALP SERPL-CCNC: 169 U/L (ref 35–104)
ALT SERPL-CCNC: 79 U/L (ref 5–33)
ANION GAP SERPL CALCULATED.3IONS-SCNC: 9 MMOL/L (ref 7–19)
AST SERPL-CCNC: 28 U/L (ref 5–32)
BASOPHILS # BLD: 0 K/UL (ref 0–0.2)
BASOPHILS NFR BLD: 0.8 % (ref 0–1)
BILIRUB SERPL-MCNC: <0.2 MG/DL (ref 0.2–1.2)
BUN SERPL-MCNC: 33 MG/DL (ref 8–23)
CALCIUM SERPL-MCNC: 9.7 MG/DL (ref 8.8–10.2)
CHLORIDE SERPL-SCNC: 103 MMOL/L (ref 98–111)
CO2 SERPL-SCNC: 26 MMOL/L (ref 22–29)
CREAT SERPL-MCNC: 1.5 MG/DL (ref 0.5–0.9)
EOSINOPHIL # BLD: 0.2 K/UL (ref 0–0.6)
EOSINOPHIL NFR BLD: 4 % (ref 0–5)
ERYTHROCYTE [DISTWIDTH] IN BLOOD BY AUTOMATED COUNT: 14.7 % (ref 11.5–14.5)
GLUCOSE SERPL-MCNC: 107 MG/DL (ref 74–109)
HCT VFR BLD AUTO: 24.2 % (ref 37–47)
HGB BLD-MCNC: 7.4 G/DL (ref 12–16)
IMM GRANULOCYTES # BLD: 0 K/UL
LYMPHOCYTES # BLD: 1.3 K/UL (ref 1.1–4.5)
LYMPHOCYTES NFR BLD: 33.6 % (ref 20–40)
MCH RBC QN AUTO: 30.2 PG (ref 27–31)
MCHC RBC AUTO-ENTMCNC: 30.6 G/DL (ref 33–37)
MCV RBC AUTO: 98.8 FL (ref 81–99)
MONOCYTES # BLD: 0.4 K/UL (ref 0–0.9)
MONOCYTES NFR BLD: 10.5 % (ref 0–10)
NEUTROPHILS # BLD: 1.9 K/UL (ref 1.5–7.5)
NEUTS SEG NFR BLD: 50.8 % (ref 50–65)
PLATELET # BLD AUTO: 191 K/UL (ref 130–400)
PMV BLD AUTO: 11.1 FL (ref 9.4–12.3)
POTASSIUM SERPL-SCNC: 4.3 MMOL/L (ref 3.5–5)
PROT SERPL-MCNC: 6.2 G/DL (ref 6.6–8.7)
RBC # BLD AUTO: 2.45 M/UL (ref 4.2–5.4)
SODIUM SERPL-SCNC: 138 MMOL/L (ref 136–145)
WBC # BLD AUTO: 3.7 K/UL (ref 4.8–10.8)

## 2024-01-05 NOTE — OUTREACH NOTE
Medical Week 2 Survey      Flowsheet Row Responses   [facility] patient discharged from? [facility]   Does the patient have one of the following disease processes/diagnoses(primary or secondary)? Other   Week 2 attempt successful? Yes   Call start time 1112   Discharge diagnosis Hypokalemiaacute urinary tract infection   Call end time 1112   Is patient permission given to speak with other caregiver? Yes   List who call center can speak with Adams spouse   Person spoke with today (if not patient) and relationship Adams spouse   Meds reviewed with patient/caregiver? Yes   Is the patient taking all medications as directed (includes completed medication regime)? Yes   Comments regarding appointments Hem/Onc apt on 1/10/24   Does the patient have a primary care provider?  Yes   Has the patient kept scheduled appointments due by today? N/A   What is the Home health agency?  [facility]   Home health comments HH still visits per spouse   Did the patient receive a copy of their discharge instructions? Yes   Nursing interventions Reviewed instructions with patient   What is the patient's perception of their health status since discharge? Improving   Is the patient/caregiver able to teach back signs and symptoms related to disease process for when to call PCP? Yes   Is the patient/caregiver able to teach back signs and symptoms related to disease process for when to call 911? Yes   Is the patient/caregiver able to teach back the hierarchy of who to call/visit for symptoms/problems? PCP, Specialist, Home health nurse, Urgent Care, ED, 911 Yes   If the patient is a current smoker, are they able to teach back resources for cessation? Not a smoker   Week 2 Call Completed? Yes   Graduated Yes   Did the patient feel the follow up calls were helpful during their recovery period? Yes   Graduated/Revoked comments Pt doing better per spouse-no concerns during call   Call end time 1112            Shasta MILLER - Navjot  Nurse

## 2024-01-08 NOTE — TELEPHONE ENCOUNTER
Pt is requesting YO almanza to discuss things she may qualify for - is that ok ?     It is difficult for pt to stand for extended periods of time and wants to inquire about getting meals and/or assistance with food due to not being able to cook and make small meals. Pt reports she got Mom's meals after being DC from hospital and they were \"a god send\".

## 2024-01-10 LAB
ALBUMIN SERPL-MCNC: 3.4 G/DL (ref 3.5–5.2)
ALP SERPL-CCNC: 114 U/L (ref 35–104)
ALT SERPL-CCNC: 18 U/L (ref 5–33)
ANION GAP SERPL CALCULATED.3IONS-SCNC: 9 MMOL/L (ref 7–19)
AST SERPL-CCNC: 10 U/L (ref 5–32)
BASOPHILS # BLD: 0 K/UL (ref 0–0.2)
BASOPHILS NFR BLD: 0.2 % (ref 0–1)
BILIRUB SERPL-MCNC: <0.2 MG/DL (ref 0.2–1.2)
BUN SERPL-MCNC: 23 MG/DL (ref 8–23)
CALCIUM SERPL-MCNC: 9.4 MG/DL (ref 8.8–10.2)
CHLORIDE SERPL-SCNC: 108 MMOL/L (ref 98–111)
CO2 SERPL-SCNC: 25 MMOL/L (ref 22–29)
CREAT SERPL-MCNC: 1.3 MG/DL (ref 0.5–0.9)
EOSINOPHIL # BLD: 0.3 K/UL (ref 0–0.6)
EOSINOPHIL NFR BLD: 5.3 % (ref 0–5)
ERYTHROCYTE [DISTWIDTH] IN BLOOD BY AUTOMATED COUNT: 14.6 % (ref 11.5–14.5)
GLUCOSE SERPL-MCNC: 90 MG/DL (ref 74–109)
HCT VFR BLD AUTO: 24.6 % (ref 37–47)
HGB BLD-MCNC: 7.4 G/DL (ref 12–16)
IMM GRANULOCYTES # BLD: 0 K/UL
LYMPHOCYTES # BLD: 1.3 K/UL (ref 1.1–4.5)
LYMPHOCYTES NFR BLD: 24.7 % (ref 20–40)
MCH RBC QN AUTO: 29.6 PG (ref 27–31)
MCHC RBC AUTO-ENTMCNC: 30.1 G/DL (ref 33–37)
MCV RBC AUTO: 98.4 FL (ref 81–99)
MONOCYTES # BLD: 0.5 K/UL (ref 0–0.9)
MONOCYTES NFR BLD: 8.7 % (ref 0–10)
NEUTROPHILS # BLD: 3.3 K/UL (ref 1.5–7.5)
NEUTS SEG NFR BLD: 60.5 % (ref 50–65)
PLATELET # BLD AUTO: 213 K/UL (ref 130–400)
PMV BLD AUTO: 11.5 FL (ref 9.4–12.3)
POTASSIUM SERPL-SCNC: 4.3 MMOL/L (ref 3.5–5)
PROT SERPL-MCNC: 6.6 G/DL (ref 6.6–8.7)
RBC # BLD AUTO: 2.5 M/UL (ref 4.2–5.4)
SODIUM SERPL-SCNC: 142 MMOL/L (ref 136–145)
WBC # BLD AUTO: 5.4 K/UL (ref 4.8–10.8)

## 2024-01-10 RX ORDER — ATORVASTATIN CALCIUM 40 MG/1
TABLET, FILM COATED ORAL
Qty: 90 TABLET | Refills: 3 | Status: SHIPPED | OUTPATIENT
Start: 2024-01-10

## 2024-01-10 NOTE — TELEPHONE ENCOUNTER
Brenda called requesting a refill of the below medication which has been pended for you:     Requested Prescriptions     Pending Prescriptions Disp Refills    atorvastatin (LIPITOR) 40 MG tablet [Pharmacy Med Name: ATORVASTATIN TABS 40MG] 90 tablet 3     Sig: TAKE 1 TABLET DAILY AS DIRECTED       Last Appointment Date: 1/3/2024  Next Appointment Date: 4/3/2024    Allergies   Allergen Reactions    Ambien [Zolpidem Tartrate]     Codeine     Lyrica [Pregabalin]     Morphine     Requip [Ropinirole Hcl]     Tizanidine      Terrible nightmares

## 2024-01-25 DIAGNOSIS — E55.9 VITAMIN D DEFICIENCY: ICD-10-CM

## 2024-01-25 DIAGNOSIS — I10 PRIMARY HYPERTENSION: ICD-10-CM

## 2024-01-25 LAB
25(OH)D3 SERPL-MCNC: 76.4 NG/ML
BASOPHILS # BLD: 0 K/UL (ref 0–0.2)
BASOPHILS NFR BLD: 0.3 % (ref 0–1)
EOSINOPHIL # BLD: 0.2 K/UL (ref 0–0.6)
EOSINOPHIL NFR BLD: 2.3 % (ref 0–5)
ERYTHROCYTE [DISTWIDTH] IN BLOOD BY AUTOMATED COUNT: 14.4 % (ref 11.5–14.5)
HCT VFR BLD AUTO: 36.3 % (ref 37–47)
HGB BLD-MCNC: 11.4 G/DL (ref 12–16)
IMM GRANULOCYTES # BLD: 0 K/UL
LYMPHOCYTES # BLD: 1.3 K/UL (ref 1.1–4.5)
LYMPHOCYTES NFR BLD: 18.6 % (ref 20–40)
MCH RBC QN AUTO: 29.3 PG (ref 27–31)
MCHC RBC AUTO-ENTMCNC: 31.4 G/DL (ref 33–37)
MCV RBC AUTO: 93.3 FL (ref 81–99)
MONOCYTES # BLD: 0.5 K/UL (ref 0–0.9)
MONOCYTES NFR BLD: 6.8 % (ref 0–10)
NEUTROPHILS # BLD: 4.9 K/UL (ref 1.5–7.5)
NEUTS SEG NFR BLD: 71.6 % (ref 50–65)
PLATELET # BLD AUTO: 145 K/UL (ref 130–400)
PMV BLD AUTO: 10.5 FL (ref 9.4–12.3)
RBC # BLD AUTO: 3.89 M/UL (ref 4.2–5.4)
TSH SERPL DL<=0.005 MIU/L-ACNC: 2.45 UIU/ML (ref 0.27–4.2)
WBC # BLD AUTO: 6.9 K/UL (ref 4.8–10.8)

## 2024-01-29 ENCOUNTER — OFFICE VISIT (OUTPATIENT)
Dept: INTERNAL MEDICINE | Age: 84
End: 2024-01-29
Payer: MEDICARE

## 2024-01-29 VITALS
HEIGHT: 62 IN | DIASTOLIC BLOOD PRESSURE: 63 MMHG | HEART RATE: 65 BPM | BODY MASS INDEX: 33.65 KG/M2 | SYSTOLIC BLOOD PRESSURE: 118 MMHG | OXYGEN SATURATION: 95 %

## 2024-01-29 DIAGNOSIS — E78.2 MIXED HYPERLIPIDEMIA: ICD-10-CM

## 2024-01-29 DIAGNOSIS — N18.31 STAGE 3A CHRONIC KIDNEY DISEASE (HCC): ICD-10-CM

## 2024-01-29 DIAGNOSIS — E55.9 VITAMIN D DEFICIENCY: ICD-10-CM

## 2024-01-29 DIAGNOSIS — I10 PRIMARY HYPERTENSION: ICD-10-CM

## 2024-01-29 DIAGNOSIS — D64.9 ANEMIA, UNSPECIFIED TYPE: Primary | ICD-10-CM

## 2024-01-29 DIAGNOSIS — M54.2 NECK PAIN: ICD-10-CM

## 2024-01-29 PROCEDURE — G8427 DOCREV CUR MEDS BY ELIG CLIN: HCPCS | Performed by: NURSE PRACTITIONER

## 2024-01-29 PROCEDURE — G8484 FLU IMMUNIZE NO ADMIN: HCPCS | Performed by: NURSE PRACTITIONER

## 2024-01-29 PROCEDURE — G8399 PT W/DXA RESULTS DOCUMENT: HCPCS | Performed by: NURSE PRACTITIONER

## 2024-01-29 PROCEDURE — 1036F TOBACCO NON-USER: CPT | Performed by: NURSE PRACTITIONER

## 2024-01-29 PROCEDURE — 99214 OFFICE O/P EST MOD 30 MIN: CPT | Performed by: NURSE PRACTITIONER

## 2024-01-29 PROCEDURE — 1123F ACP DISCUSS/DSCN MKR DOCD: CPT | Performed by: NURSE PRACTITIONER

## 2024-01-29 PROCEDURE — 1090F PRES/ABSN URINE INCON ASSESS: CPT | Performed by: NURSE PRACTITIONER

## 2024-01-29 PROCEDURE — 3074F SYST BP LT 130 MM HG: CPT | Performed by: NURSE PRACTITIONER

## 2024-01-29 PROCEDURE — G0008 ADMIN INFLUENZA VIRUS VAC: HCPCS | Performed by: NURSE PRACTITIONER

## 2024-01-29 PROCEDURE — G8417 CALC BMI ABV UP PARAM F/U: HCPCS | Performed by: NURSE PRACTITIONER

## 2024-01-29 PROCEDURE — 90694 VACC AIIV4 NO PRSRV 0.5ML IM: CPT | Performed by: NURSE PRACTITIONER

## 2024-01-29 PROCEDURE — 3078F DIAST BP <80 MM HG: CPT | Performed by: NURSE PRACTITIONER

## 2024-01-29 RX ORDER — SUMATRIPTAN 50 MG/1
50 TABLET, FILM COATED ORAL DAILY PRN
Qty: 18 TABLET | Refills: 5 | Status: SHIPPED | OUTPATIENT
Start: 2024-01-29

## 2024-01-29 RX ORDER — CYCLOBENZAPRINE HCL 10 MG
10 TABLET ORAL 3 TIMES DAILY PRN
Qty: 30 TABLET | Refills: 1 | Status: SHIPPED | OUTPATIENT
Start: 2024-01-29

## 2024-01-29 RX ORDER — LANSOPRAZOLE 30 MG/1
CAPSULE, DELAYED RELEASE ORAL
Qty: 90 CAPSULE | Refills: 1 | Status: SHIPPED | OUTPATIENT
Start: 2024-01-29

## 2024-01-29 RX ORDER — OXYCODONE HYDROCHLORIDE 15 MG/1
15 TABLET ORAL EVERY 6 HOURS PRN
COMMUNITY
Start: 2023-12-26

## 2024-01-29 ASSESSMENT — ENCOUNTER SYMPTOMS
ABDOMINAL PAIN: 0
EYE ITCHING: 0
CHOKING: 0
CONSTIPATION: 0
BLOOD IN STOOL: 0
COUGH: 0
ABDOMINAL DISTENTION: 0
SHORTNESS OF BREATH: 0
WHEEZING: 0
VOMITING: 0
TROUBLE SWALLOWING: 0
DIARRHEA: 0
EYE DISCHARGE: 0
STRIDOR: 0
COLOR CHANGE: 0
BACK PAIN: 1
NAUSEA: 0
SORE THROAT: 0

## 2024-01-29 NOTE — ASSESSMENT & PLAN NOTE
Looks much better today ;  for some reason lab didn't do her CMP; but we can get it at her upcoming routine exam

## 2024-01-29 NOTE — PROGRESS NOTES
[facility]  [facility] INTER[facility]IC[facility]  [address] [place] [address] [address]IVE  [address]  [place] [address]  Dept: [phone]  Dept Fax: [phone]  Loc: [phone]    Brenda Cruz (:  1940) is a 83 y.o. female,Established patient  with green, here for evaluation of the following chief complaint(s): Other (Discuss labs)      Brenda Cruz is a 83 y.o. female who presents today for her medical conditions/complaints as noted below.  Brenda Cruz is c/maame Other (Discuss labs)        HPI:     Chief Complaint   Patient presents with    Other     Discuss labs     @pt is    accompanied by dtr   HPI    Recent admission with UTI;  cleared;    2.  GUSTAVO with last admission     3.  Vit d def;    level is good at 76 on 50,000 a weeks   4.  Hypertension stable with coreg 12.5   5.  Anemia;  she was down at her last admission to 7.4 and today is back up to 11.4           Past Medical History:   Diagnosis Date    Acute cholecystitis 2023    Acute kidney injury (GUSTAVO) with acute tubular necrosis (ATN) (HCC) 2023    Adenocarcinoma of endometrium, stage 1 (HCC) 2017    Anemia     Arthritis     CAD (coronary artery disease)     \"Stiff Valve\"    Chronic kidney disease     Depression     Fibrocystic breast disease     17 biopsy - benign FCBD w/microcalcifications - [facility]     GERD (gastroesophageal reflux disease)     H/O vaginal bleeding     managed Dr Bell    History of therapeutic radiation     Hypertension     Hypothyroidism (acquired)     Hypothyroidism (acquired)     Mixed hyperlipidemia     Obesity     Osteoarthritis     Pain of both hip joints 2016    Situational anxiety     Sleep apnea     Vitamin D deficiency       Past Surgical History:   Procedure Laterality Date    BREAST BIOPSY Right     BREAST SURGERY Left     Biopsy, Benign    CYST REMOVAL Left     Shoulder cyst, benign    DILATION AND CURETTAGE OF UTERUS      EYE SURGERY  2017    HYSTERECTOMY (CERVIX STATUS UNKNOWN)

## 2024-02-13 RX ORDER — ERGOCALCIFEROL 1.25 MG/1
50000 CAPSULE ORAL WEEKLY
Qty: 12 CAPSULE | Refills: 2 | Status: SHIPPED | OUTPATIENT
Start: 2024-02-13

## 2024-02-13 NOTE — TELEPHONE ENCOUNTER
Brenda called requesting a refill of the below medication which has been pended for you:     Requested Prescriptions     Pending Prescriptions Disp Refills    vitamin D (ERGOCALCIFEROL) 1.25 MG (72185 UT) CAPS capsule [Pharmacy Med Name: VITAMIN D 1.25MG CAPSULE] 12 capsule 2     Sig: TAKE 1 CAPSULE BY MOUTH ONCE A WEEK       Last Appointment Date: 1/29/2024  Next Appointment Date: 4/3/2024    Allergies   Allergen Reactions    Ambien [Zolpidem Tartrate]     Codeine     Lyrica [Pregabalin]     Morphine     Requip [Ropinirole Hcl]     Tizanidine      Terrible nightmares

## 2024-02-27 DIAGNOSIS — M54.2 NECK PAIN: ICD-10-CM

## 2024-02-27 RX ORDER — CYCLOBENZAPRINE HCL 10 MG
10 TABLET ORAL 3 TIMES DAILY PRN
Qty: 90 TABLET | Refills: 1 | Status: SHIPPED | OUTPATIENT
Start: 2024-02-27

## 2024-02-27 NOTE — TELEPHONE ENCOUNTER
Bernda called requesting a refill of the below medication which has been pended for you:     Requested Prescriptions     Pending Prescriptions Disp Refills    cyclobenzaprine (FLEXERIL) 10 MG tablet 90 tablet 1     Sig: Take 1 tablet by mouth 3 times daily as needed for Muscle spasms       Last Appointment Date: Visit date not found  Next Appointment Date: 4/3/2024    Allergies   Allergen Reactions    Ambien [Zolpidem Tartrate]     Codeine     Lyrica [Pregabalin]     Morphine     Requip [Ropinirole Hcl]     Tizanidine      Terrible nightmares

## 2024-02-27 NOTE — TELEPHONE ENCOUNTER
Sw pt she  is wanting to know if you could change her script of flexeril 10 to 3 times a day right now she is taking once daily please advise .

## 2024-03-20 DIAGNOSIS — F41.9 ANXIETY: ICD-10-CM

## 2024-03-20 DIAGNOSIS — F33.1 MODERATE EPISODE OF RECURRENT MAJOR DEPRESSIVE DISORDER: ICD-10-CM

## 2024-03-20 RX ORDER — CARVEDILOL 12.5 MG/1
TABLET ORAL
Qty: 60 TABLET | Refills: 5 | Status: SHIPPED | OUTPATIENT
Start: 2024-03-20

## 2024-03-20 RX ORDER — DONEPEZIL HYDROCHLORIDE 10 MG/1
10 TABLET, FILM COATED ORAL NIGHTLY
Qty: 30 TABLET | Refills: 5 | Status: SHIPPED | OUTPATIENT
Start: 2024-03-20 | End: 2024-09-16

## 2024-03-20 NOTE — TELEPHONE ENCOUNTER
Brenda called requesting a refill of the below medication which has been pended for you:     Requested Prescriptions     Pending Prescriptions Disp Refills    donepezil (ARICEPT) 10 MG tablet [Pharmacy Med Name: DONEPEZIL HCL 10MG TABLET] 30 tablet 5     Sig: TAKE 1 TABLET BY MOUTH NIGHTLY    carvedilol (COREG) 12.5 MG tablet [Pharmacy Med Name: CARVEDILOL 12.5MG TABLET] 60 tablet 5     Sig: TAKE 1 TABLET BY MOUTH TWICE DAILY.       Last Appointment Date: 1/29/2024  Next Appointment Date: 4/3/2024    Allergies   Allergen Reactions    Ambien [Zolpidem Tartrate]     Codeine     Lyrica [Pregabalin]     Morphine     Requip [Ropinirole Hcl]     Tizanidine      Terrible nightmares

## 2024-03-21 NOTE — TELEPHONE ENCOUNTER
I have not seen this patient in over a year, and the last time she was here was May of last year.  Please contact Jennifer and see if she meant for these refills to come to our office, or if this was an automatic refill request from her pharmacy.  Since she is no longer getting Xanax from us, it may be easier for her to get all of her medications from her PCP.  I am happy to see her once a year and continue these medications, but she will need an office visit.

## 2024-03-21 NOTE — TELEPHONE ENCOUNTER
Called and spoke with pt daughter, Lola. She voiced that she is still wanting to get refills of medication from our office. Pt transferred to scheduled to set up appointment.

## 2024-03-22 RX ORDER — VENLAFAXINE 25 MG/1
25 TABLET ORAL DAILY
Qty: 30 TABLET | Refills: 5 | Status: SHIPPED | OUTPATIENT
Start: 2024-03-22

## 2024-03-22 RX ORDER — SERTRALINE HYDROCHLORIDE 25 MG/1
25 TABLET, FILM COATED ORAL DAILY
Qty: 30 TABLET | Refills: 6 | Status: SHIPPED | OUTPATIENT
Start: 2024-03-22 | End: 2025-03-22

## 2024-05-08 DIAGNOSIS — E55.9 VITAMIN D DEFICIENCY: ICD-10-CM

## 2024-05-08 DIAGNOSIS — E78.2 MIXED HYPERLIPIDEMIA: ICD-10-CM

## 2024-05-08 DIAGNOSIS — I10 PRIMARY HYPERTENSION: ICD-10-CM

## 2024-05-08 LAB
25(OH)D3 SERPL-MCNC: 84 NG/ML
ALBUMIN SERPL-MCNC: 3.5 G/DL (ref 3.5–5.2)
ALP SERPL-CCNC: 74 U/L (ref 35–104)
ALT SERPL-CCNC: 8 U/L (ref 5–33)
ANION GAP SERPL CALCULATED.3IONS-SCNC: 13 MMOL/L (ref 7–19)
AST SERPL-CCNC: 13 U/L (ref 5–32)
BACTERIA URNS QL MICRO: ABNORMAL /HPF
BASOPHILS # BLD: 0 K/UL (ref 0–0.2)
BASOPHILS NFR BLD: 0.2 % (ref 0–1)
BILIRUB SERPL-MCNC: 0.3 MG/DL (ref 0.2–1.2)
BILIRUB UR QL STRIP: NEGATIVE
BUN SERPL-MCNC: 23 MG/DL (ref 8–23)
CALCIUM SERPL-MCNC: 9.7 MG/DL (ref 8.8–10.2)
CHLORIDE SERPL-SCNC: 104 MMOL/L (ref 98–111)
CHOLEST SERPL-MCNC: 140 MG/DL (ref 160–199)
CLARITY UR: ABNORMAL
CO2 SERPL-SCNC: 23 MMOL/L (ref 22–29)
COLOR UR: YELLOW
CREAT SERPL-MCNC: 1.2 MG/DL (ref 0.5–0.9)
CRYSTALS URNS MICRO: ABNORMAL /HPF
EOSINOPHIL # BLD: 0.2 K/UL (ref 0–0.6)
EOSINOPHIL NFR BLD: 4.4 % (ref 0–5)
EPI CELLS #/AREA URNS AUTO: 1 /HPF (ref 0–5)
ERYTHROCYTE [DISTWIDTH] IN BLOOD BY AUTOMATED COUNT: 12.7 % (ref 11.5–14.5)
GLUCOSE SERPL-MCNC: 92 MG/DL (ref 74–109)
GLUCOSE UR STRIP.AUTO-MCNC: NEGATIVE MG/DL
HCT VFR BLD AUTO: 30 % (ref 37–47)
HDLC SERPL-MCNC: 61 MG/DL (ref 65–121)
HGB BLD-MCNC: 9.5 G/DL (ref 12–16)
HGB UR STRIP.AUTO-MCNC: ABNORMAL MG/L
HYALINE CASTS #/AREA URNS AUTO: 2 /HPF (ref 0–8)
IMM GRANULOCYTES # BLD: 0 K/UL
KETONES UR STRIP.AUTO-MCNC: NEGATIVE MG/DL
LDLC SERPL CALC-MCNC: 58 MG/DL
LEUKOCYTE ESTERASE UR QL STRIP.AUTO: ABNORMAL
LYMPHOCYTES # BLD: 1.1 K/UL (ref 1.1–4.5)
LYMPHOCYTES NFR BLD: 26.7 % (ref 20–40)
MCH RBC QN AUTO: 29.8 PG (ref 27–31)
MCHC RBC AUTO-ENTMCNC: 31.7 G/DL (ref 33–37)
MCV RBC AUTO: 94 FL (ref 81–99)
MONOCYTES # BLD: 0.4 K/UL (ref 0–0.9)
MONOCYTES NFR BLD: 10 % (ref 0–10)
NEUTROPHILS # BLD: 2.4 K/UL (ref 1.5–7.5)
NEUTS SEG NFR BLD: 58.5 % (ref 50–65)
NITRITE UR QL STRIP.AUTO: POSITIVE
PH UR STRIP.AUTO: 6 [PH] (ref 5–8)
PLATELET # BLD AUTO: 125 K/UL (ref 130–400)
PMV BLD AUTO: 11.6 FL (ref 9.4–12.3)
POTASSIUM SERPL-SCNC: 3.9 MMOL/L (ref 3.5–5)
PROT SERPL-MCNC: 6.6 G/DL (ref 6.6–8.7)
PROT UR STRIP.AUTO-MCNC: 100 MG/DL
RBC # BLD AUTO: 3.19 M/UL (ref 4.2–5.4)
RBC #/AREA URNS AUTO: 25 /HPF (ref 0–4)
SODIUM SERPL-SCNC: 140 MMOL/L (ref 136–145)
SP GR UR STRIP.AUTO: 1.02 (ref 1–1.03)
TRIGL SERPL-MCNC: 107 MG/DL (ref 0–149)
TSH SERPL DL<=0.005 MIU/L-ACNC: 4.62 UIU/ML (ref 0.27–4.2)
UROBILINOGEN UR STRIP.AUTO-MCNC: 0.2 E.U./DL
WBC # BLD AUTO: 4.1 K/UL (ref 4.8–10.8)
WBC #/AREA URNS AUTO: >900 /HPF (ref 0–5)

## 2024-05-09 ENCOUNTER — OFFICE VISIT (OUTPATIENT)
Dept: INTERNAL MEDICINE | Age: 84
End: 2024-05-09
Payer: MEDICARE

## 2024-05-09 VITALS
WEIGHT: 155 LBS | TEMPERATURE: 97.6 F | SYSTOLIC BLOOD PRESSURE: 122 MMHG | DIASTOLIC BLOOD PRESSURE: 78 MMHG | BODY MASS INDEX: 28.52 KG/M2 | HEIGHT: 62 IN | HEART RATE: 77 BPM | OXYGEN SATURATION: 99 %

## 2024-05-09 DIAGNOSIS — F51.01 PRIMARY INSOMNIA: ICD-10-CM

## 2024-05-09 DIAGNOSIS — C54.1 ADENOCARCINOMA OF ENDOMETRIUM, STAGE 1 (HCC): ICD-10-CM

## 2024-05-09 DIAGNOSIS — F33.41 RECURRENT MAJOR DEPRESSIVE DISORDER, IN PARTIAL REMISSION (HCC): ICD-10-CM

## 2024-05-09 DIAGNOSIS — R01.1 HEART MURMUR: ICD-10-CM

## 2024-05-09 DIAGNOSIS — E78.2 MIXED HYPERLIPIDEMIA: ICD-10-CM

## 2024-05-09 DIAGNOSIS — E03.9 HYPOTHYROIDISM (ACQUIRED): ICD-10-CM

## 2024-05-09 DIAGNOSIS — G47.39 OTHER SLEEP APNEA: ICD-10-CM

## 2024-05-09 DIAGNOSIS — N18.32 STAGE 3B CHRONIC KIDNEY DISEASE (HCC): ICD-10-CM

## 2024-05-09 DIAGNOSIS — M47.816 LUMBAR FACET ARTHROPATHY: Chronic | ICD-10-CM

## 2024-05-09 DIAGNOSIS — M54.2 NECK PAIN: ICD-10-CM

## 2024-05-09 DIAGNOSIS — K21.9 GASTROESOPHAGEAL REFLUX DISEASE WITHOUT ESOPHAGITIS: ICD-10-CM

## 2024-05-09 DIAGNOSIS — E55.9 VITAMIN D DEFICIENCY: ICD-10-CM

## 2024-05-09 DIAGNOSIS — D69.6 THROMBOCYTOPENIA, UNSPECIFIED (HCC): Primary | ICD-10-CM

## 2024-05-09 DIAGNOSIS — I25.10 CORONARY ARTERY DISEASE INVOLVING NATIVE CORONARY ARTERY OF NATIVE HEART WITHOUT ANGINA PECTORIS: ICD-10-CM

## 2024-05-09 DIAGNOSIS — Z00.00 MEDICARE ANNUAL WELLNESS VISIT, SUBSEQUENT: ICD-10-CM

## 2024-05-09 DIAGNOSIS — I10 PRIMARY HYPERTENSION: ICD-10-CM

## 2024-05-09 DIAGNOSIS — N32.81 OAB (OVERACTIVE BLADDER): ICD-10-CM

## 2024-05-09 DIAGNOSIS — G30.9 ALZHEIMER'S DISEASE, UNSPECIFIED (CODE) (HCC): ICD-10-CM

## 2024-05-09 PROBLEM — R60.0 BILATERAL LOWER EXTREMITY EDEMA: Status: RESOLVED | Noted: 2022-02-01 | Resolved: 2024-05-09

## 2024-05-09 PROCEDURE — 99214 OFFICE O/P EST MOD 30 MIN: CPT | Performed by: NURSE PRACTITIONER

## 2024-05-09 PROCEDURE — G8417 CALC BMI ABV UP PARAM F/U: HCPCS | Performed by: NURSE PRACTITIONER

## 2024-05-09 PROCEDURE — G8427 DOCREV CUR MEDS BY ELIG CLIN: HCPCS | Performed by: NURSE PRACTITIONER

## 2024-05-09 PROCEDURE — 1090F PRES/ABSN URINE INCON ASSESS: CPT | Performed by: NURSE PRACTITIONER

## 2024-05-09 PROCEDURE — G0439 PPPS, SUBSEQ VISIT: HCPCS | Performed by: NURSE PRACTITIONER

## 2024-05-09 PROCEDURE — 3078F DIAST BP <80 MM HG: CPT | Performed by: NURSE PRACTITIONER

## 2024-05-09 PROCEDURE — 1123F ACP DISCUSS/DSCN MKR DOCD: CPT | Performed by: NURSE PRACTITIONER

## 2024-05-09 PROCEDURE — 1036F TOBACCO NON-USER: CPT | Performed by: NURSE PRACTITIONER

## 2024-05-09 PROCEDURE — G8399 PT W/DXA RESULTS DOCUMENT: HCPCS | Performed by: NURSE PRACTITIONER

## 2024-05-09 PROCEDURE — 3074F SYST BP LT 130 MM HG: CPT | Performed by: NURSE PRACTITIONER

## 2024-05-09 RX ORDER — SERTRALINE HYDROCHLORIDE 25 MG/1
25 TABLET, FILM COATED ORAL DAILY
Qty: 90 TABLET | Refills: 1 | Status: SHIPPED | OUTPATIENT
Start: 2024-05-09

## 2024-05-09 RX ORDER — LANSOPRAZOLE 30 MG/1
CAPSULE, DELAYED RELEASE ORAL
Qty: 90 CAPSULE | Refills: 1 | Status: SHIPPED | OUTPATIENT
Start: 2024-05-09 | End: 2024-05-09 | Stop reason: SDUPTHER

## 2024-05-09 RX ORDER — BUTALBITAL, ACETAMINOPHEN AND CAFFEINE 50; 325; 40 MG/1; MG/1; MG/1
1 TABLET ORAL 2 TIMES DAILY PRN
Qty: 45 TABLET | Refills: 0 | Status: CANCELLED | OUTPATIENT
Start: 2024-05-09

## 2024-05-09 RX ORDER — VENLAFAXINE 25 MG/1
25 TABLET ORAL DAILY
Qty: 90 TABLET | Refills: 1 | Status: SHIPPED | OUTPATIENT
Start: 2024-05-09

## 2024-05-09 RX ORDER — LANSOPRAZOLE 30 MG/1
30 CAPSULE, DELAYED RELEASE ORAL 2 TIMES DAILY
Qty: 90 CAPSULE | Refills: 1 | Status: SHIPPED | OUTPATIENT
Start: 2024-05-09

## 2024-05-09 SDOH — ECONOMIC STABILITY: INCOME INSECURITY: HOW HARD IS IT FOR YOU TO PAY FOR THE VERY BASICS LIKE FOOD, HOUSING, MEDICAL CARE, AND HEATING?: NOT VERY HARD

## 2024-05-09 SDOH — ECONOMIC STABILITY: FOOD INSECURITY: WITHIN THE PAST 12 MONTHS, YOU WORRIED THAT YOUR FOOD WOULD RUN OUT BEFORE YOU GOT MONEY TO BUY MORE.: NEVER TRUE

## 2024-05-09 SDOH — ECONOMIC STABILITY: FOOD INSECURITY: WITHIN THE PAST 12 MONTHS, THE FOOD YOU BOUGHT JUST DIDN'T LAST AND YOU DIDN'T HAVE MONEY TO GET MORE.: NEVER TRUE

## 2024-05-09 ASSESSMENT — PATIENT HEALTH QUESTIONNAIRE - PHQ9
3. TROUBLE FALLING OR STAYING ASLEEP: NOT AT ALL
9. THOUGHTS THAT YOU WOULD BE BETTER OFF DEAD, OR OF HURTING YOURSELF: NOT AT ALL
SUM OF ALL RESPONSES TO PHQ QUESTIONS 1-9: 1
10. IF YOU CHECKED OFF ANY PROBLEMS, HOW DIFFICULT HAVE THESE PROBLEMS MADE IT FOR YOU TO DO YOUR WORK, TAKE CARE OF THINGS AT HOME, OR GET ALONG WITH OTHER PEOPLE: NOT DIFFICULT AT ALL
7. TROUBLE CONCENTRATING ON THINGS, SUCH AS READING THE NEWSPAPER OR WATCHING TELEVISION: NOT AT ALL
SUM OF ALL RESPONSES TO PHQ QUESTIONS 1-9: 1
1. LITTLE INTEREST OR PLEASURE IN DOING THINGS: SEVERAL DAYS
4. FEELING TIRED OR HAVING LITTLE ENERGY: NOT AT ALL
2. FEELING DOWN, DEPRESSED OR HOPELESS: NOT AT ALL
5. POOR APPETITE OR OVEREATING: NOT AT ALL
8. MOVING OR SPEAKING SO SLOWLY THAT OTHER PEOPLE COULD HAVE NOTICED. OR THE OPPOSITE, BEING SO FIGETY OR RESTLESS THAT YOU HAVE BEEN MOVING AROUND A LOT MORE THAN USUAL: NOT AT ALL
SUM OF ALL RESPONSES TO PHQ9 QUESTIONS 1 & 2: 1
SUM OF ALL RESPONSES TO PHQ QUESTIONS 1-9: 1
6. FEELING BAD ABOUT YOURSELF - OR THAT YOU ARE A FAILURE OR HAVE LET YOURSELF OR YOUR FAMILY DOWN: NOT AT ALL
SUM OF ALL RESPONSES TO PHQ QUESTIONS 1-9: 1

## 2024-05-09 ASSESSMENT — ENCOUNTER SYMPTOMS
ABDOMINAL DISTENTION: 0
BLOOD IN STOOL: 0
TROUBLE SWALLOWING: 0
COUGH: 0
SHORTNESS OF BREATH: 0
NAUSEA: 0
SORE THROAT: 0
STRIDOR: 0
DIARRHEA: 0
COLOR CHANGE: 0
CONSTIPATION: 0
EYE DISCHARGE: 0
CHOKING: 0
EYE ITCHING: 0
WHEEZING: 0
BACK PAIN: 1
VOMITING: 0
ABDOMINAL PAIN: 0

## 2024-05-09 NOTE — PATIENT INSTRUCTIONS
1.  Coronary artery disease stable no changes  Hypertension controlled current meds no changes  3 sleep apnea she reports she no longer uses a mask  4.  GERD stable with Prevacid  5 hypothyroidism stable on current dose  Over 6 low back pain with sciatica stable with Flexeril Voltaren gel she is treated by pain management  7.  History of ovarian cancer  8.  OA based improved with Vesicare  9.  Chronic kidney disease has improved continue current plenty fluids  10.  Hyperlipidemia cholesterol triglycerides are good on Lipitor no changes  11 insomnia  12 depression stable with current meds no changes  13 vitamin D deficiency stable on current dose  14.  Migraine stable with current meds she gets those from pain management  15.  Memory loss stable with Aricept  16.  Anemia there has been a drop in the hemoglobin we will need to monitor this  PATIENT INSTRUCTIONS:      IT IS VERY IMPORTANT THAT YOU GET YOUR LABS AT LEAST 1 DAY BEFORE YOUR APPT.  WE MAY HAVE TO RESCHEDULE YOU IF YOU WE DONT HAVE THE RESULTS;      WE CAN DISCUSS THE LABS ON THE DAY OF YOUR OFFICE VISIT AND MAKE CHANGES TOGETHER WHILE YOU ARE HERE.  THEN YOU CAN LEAVE WITH A COPY OF THOSE CHANGES.     CALLING YOU BACK WITH ROUTINE RESULTS IS VERY TIME CONSUMING;  I HAVE TO REVIEW THE LABS, THEN YOUR PAST LABS AND OV, THEN MAKE A NOTE TO THE NURSES TO CALL YOU, OR I CALL MYSELF WITH EXTREME ABNORMALITIES WHICH CAN BE TIME CONSUMING AS WELL;  WHEN I HAVE TO TAKE TIME OUT OF ANOTHER DAY FOR YOUR RESULTS THAT IS TIME AWAY FROM OTHER PATIENTS AND DISCUSSING RESULTS OVER THE PHONE IS JUST NOT A GOOD IDEA;  WHILE HERE WE CAN WRITE DOWN THE CHANGES BEFORE YOU LEAVE THE OFFICE;      Life simple 7  1) Manage blood pressure - high blood pressure is a major risk factor for heart disease and stroke. Keeping blood pressure in health range reduces strain on your heart, arteries and kidneys.  Blood pressure goal is less than 130/80.   2) Control cholesterol - contributes

## 2024-05-10 LAB
BACTERIA UR CULT: ABNORMAL
BACTERIA UR CULT: ABNORMAL
ORGANISM: ABNORMAL

## 2024-05-13 RX ORDER — SULFAMETHOXAZOLE AND TRIMETHOPRIM 800; 160 MG/1; MG/1
1 TABLET ORAL 2 TIMES DAILY
Qty: 14 TABLET | Refills: 0 | Status: SHIPPED | OUTPATIENT
Start: 2024-05-13 | End: 2024-05-20

## 2024-05-28 RX ORDER — SUMATRIPTAN 50 MG/1
50 TABLET, FILM COATED ORAL DAILY PRN
Qty: 18 TABLET | Refills: 5 | Status: SHIPPED | OUTPATIENT
Start: 2024-05-28

## 2024-05-28 NOTE — TELEPHONE ENCOUNTER
Last OV 5/9/2024  Next OV 8/12/2024      Requested Prescriptions     Pending Prescriptions Disp Refills    SUMAtriptan (IMITREX) 50 MG tablet [Pharmacy Med Name: SUMATRIPTAN SUCCINATE 50MG TABLET] 18 tablet 5     Sig: TAKE 1 TABLET BY MOUTH DAILY AS NEEDED FOR MIGRAINE

## 2024-06-13 RX ORDER — SUMATRIPTAN 50 MG/1
50 TABLET, FILM COATED ORAL DAILY PRN
Qty: 30 TABLET | Refills: 5 | Status: SHIPPED | OUTPATIENT
Start: 2024-06-13

## 2024-06-13 NOTE — TELEPHONE ENCOUNTER
Brenda called requesting a refill of the below medication which has been pended for you:     Requested Prescriptions     Pending Prescriptions Disp Refills    SUMAtriptan (IMITREX) 50 MG tablet 30 tablet 5     Sig: Take 1 tablet by mouth daily as needed for Migraine       Last Appointment Date: Visit date not found  Next Appointment Date: 8/12/2024    Allergies   Allergen Reactions    Ambien [Zolpidem Tartrate]     Codeine     Lyrica [Pregabalin]     Morphine     Requip [Ropinirole Hcl]     Tizanidine      Terrible nightmares

## 2024-07-18 RX ORDER — IPRATROPIUM BROMIDE 42 UG/1
SPRAY, METERED NASAL
Qty: 15 ML | Refills: 1 | Status: SHIPPED | OUTPATIENT
Start: 2024-07-18

## 2024-07-31 RX ORDER — LEVOTHYROXINE SODIUM 0.05 MG/1
50 TABLET ORAL DAILY
Qty: 90 TABLET | Refills: 3 | Status: SHIPPED | OUTPATIENT
Start: 2024-07-31

## 2024-08-07 DIAGNOSIS — E78.2 MIXED HYPERLIPIDEMIA: ICD-10-CM

## 2024-08-07 DIAGNOSIS — E55.9 VITAMIN D DEFICIENCY: ICD-10-CM

## 2024-08-07 DIAGNOSIS — I10 PRIMARY HYPERTENSION: ICD-10-CM

## 2024-08-07 DIAGNOSIS — E03.9 HYPOTHYROIDISM (ACQUIRED): ICD-10-CM

## 2024-08-07 LAB
25(OH)D3 SERPL-MCNC: 99.2 NG/ML
ALBUMIN SERPL-MCNC: 3.4 G/DL (ref 3.5–5.2)
ALP SERPL-CCNC: 78 U/L (ref 35–104)
ALT SERPL-CCNC: 10 U/L (ref 5–33)
ANION GAP SERPL CALCULATED.3IONS-SCNC: 17 MMOL/L (ref 7–19)
AST SERPL-CCNC: 13 U/L (ref 5–32)
BACTERIA URNS QL MICRO: ABNORMAL /HPF
BASOPHILS # BLD: 0 K/UL (ref 0–0.2)
BASOPHILS NFR BLD: 0.3 % (ref 0–1)
BILIRUB SERPL-MCNC: 0.2 MG/DL (ref 0.2–1.2)
BILIRUB UR QL STRIP: NEGATIVE
BUN SERPL-MCNC: 27 MG/DL (ref 8–23)
CALCIUM SERPL-MCNC: 9.1 MG/DL (ref 8.8–10.2)
CHLORIDE SERPL-SCNC: 103 MMOL/L (ref 98–111)
CHOLEST SERPL-MCNC: 180 MG/DL (ref 160–199)
CLARITY UR: ABNORMAL
CO2 SERPL-SCNC: 19 MMOL/L (ref 22–29)
COLOR UR: YELLOW
CREAT SERPL-MCNC: 1.6 MG/DL (ref 0.5–0.9)
CRYSTALS URNS MICRO: ABNORMAL /HPF
EOSINOPHIL # BLD: 0.3 K/UL (ref 0–0.6)
EOSINOPHIL NFR BLD: 4.4 % (ref 0–5)
EPI CELLS #/AREA URNS AUTO: 0 /HPF (ref 0–5)
ERYTHROCYTE [DISTWIDTH] IN BLOOD BY AUTOMATED COUNT: 13.2 % (ref 11.5–14.5)
GLUCOSE SERPL-MCNC: 98 MG/DL (ref 74–109)
GLUCOSE UR STRIP.AUTO-MCNC: NEGATIVE MG/DL
HCT VFR BLD AUTO: 29.3 % (ref 37–47)
HDLC SERPL-MCNC: 54 MG/DL (ref 65–121)
HGB BLD-MCNC: 8.9 G/DL (ref 12–16)
HGB UR STRIP.AUTO-MCNC: ABNORMAL MG/L
HYALINE CASTS #/AREA URNS AUTO: 1 /HPF (ref 0–8)
IMM GRANULOCYTES # BLD: 0 K/UL
KETONES UR STRIP.AUTO-MCNC: NEGATIVE MG/DL
LDLC SERPL CALC-MCNC: 100 MG/DL
LEUKOCYTE ESTERASE UR QL STRIP.AUTO: ABNORMAL
LYMPHOCYTES # BLD: 1.3 K/UL (ref 1.1–4.5)
LYMPHOCYTES NFR BLD: 21.1 % (ref 20–40)
MCH RBC QN AUTO: 28.7 PG (ref 27–31)
MCHC RBC AUTO-ENTMCNC: 30.4 G/DL (ref 33–37)
MCV RBC AUTO: 94.5 FL (ref 81–99)
MONOCYTES # BLD: 0.5 K/UL (ref 0–0.9)
MONOCYTES NFR BLD: 8.7 % (ref 0–10)
NEUTROPHILS # BLD: 4.1 K/UL (ref 1.5–7.5)
NEUTS SEG NFR BLD: 65.3 % (ref 50–65)
NITRITE UR QL STRIP.AUTO: POSITIVE
PH UR STRIP.AUTO: 6 [PH] (ref 5–8)
PLATELET # BLD AUTO: 182 K/UL (ref 130–400)
PMV BLD AUTO: 10.2 FL (ref 9.4–12.3)
POTASSIUM SERPL-SCNC: 4.3 MMOL/L (ref 3.5–5)
PROT SERPL-MCNC: 6.9 G/DL (ref 6.6–8.7)
PROT UR STRIP.AUTO-MCNC: 30 MG/DL
RBC # BLD AUTO: 3.1 M/UL (ref 4.2–5.4)
RBC #/AREA URNS AUTO: 7 /HPF (ref 0–4)
SODIUM SERPL-SCNC: 139 MMOL/L (ref 136–145)
SP GR UR STRIP.AUTO: 1.02 (ref 1–1.03)
TRIGL SERPL-MCNC: 130 MG/DL (ref 0–149)
TSH SERPL DL<=0.005 MIU/L-ACNC: 5.34 UIU/ML (ref 0.27–4.2)
UROBILINOGEN UR STRIP.AUTO-MCNC: 0.2 E.U./DL
WBC # BLD AUTO: 6.2 K/UL (ref 4.8–10.8)
WBC #/AREA URNS AUTO: 298 /HPF (ref 0–5)

## 2024-08-10 LAB
BACTERIA UR CULT: ABNORMAL
ORGANISM: ABNORMAL
ORGANISM: ABNORMAL

## 2024-08-15 ENCOUNTER — OFFICE VISIT (OUTPATIENT)
Dept: INTERNAL MEDICINE | Age: 84
End: 2024-08-15
Payer: MEDICARE

## 2024-08-15 VITALS
BODY MASS INDEX: 28.35 KG/M2 | HEIGHT: 62 IN | OXYGEN SATURATION: 94 % | SYSTOLIC BLOOD PRESSURE: 110 MMHG | DIASTOLIC BLOOD PRESSURE: 62 MMHG | HEART RATE: 68 BPM

## 2024-08-15 DIAGNOSIS — F51.01 PRIMARY INSOMNIA: ICD-10-CM

## 2024-08-15 DIAGNOSIS — F33.2 ENDOGENOUS DEPRESSION (HCC): ICD-10-CM

## 2024-08-15 DIAGNOSIS — G30.9 ALZHEIMER'S DISEASE, UNSPECIFIED (CODE) (HCC): ICD-10-CM

## 2024-08-15 DIAGNOSIS — R79.89 ABNORMAL CBC: ICD-10-CM

## 2024-08-15 DIAGNOSIS — E78.2 MIXED HYPERLIPIDEMIA: ICD-10-CM

## 2024-08-15 DIAGNOSIS — D63.8 CHRONIC DISEASE ANEMIA: ICD-10-CM

## 2024-08-15 DIAGNOSIS — N32.81 OAB (OVERACTIVE BLADDER): ICD-10-CM

## 2024-08-15 DIAGNOSIS — N18.32 STAGE 3B CHRONIC KIDNEY DISEASE (HCC): ICD-10-CM

## 2024-08-15 DIAGNOSIS — G43.909 MIGRAINE SYNDROME: ICD-10-CM

## 2024-08-15 DIAGNOSIS — M51.26 LUMBAR DISCOGENIC PAIN SYNDROME: ICD-10-CM

## 2024-08-15 DIAGNOSIS — I10 PRIMARY HYPERTENSION: ICD-10-CM

## 2024-08-15 DIAGNOSIS — E55.9 VITAMIN D DEFICIENCY: ICD-10-CM

## 2024-08-15 DIAGNOSIS — I25.10 CORONARY ARTERY DISEASE INVOLVING NATIVE CORONARY ARTERY OF NATIVE HEART WITHOUT ANGINA PECTORIS: Primary | ICD-10-CM

## 2024-08-15 DIAGNOSIS — E03.9 HYPOTHYROIDISM (ACQUIRED): ICD-10-CM

## 2024-08-15 PROBLEM — M51.360 LUMBAR DISCOGENIC PAIN SYNDROME: Status: ACTIVE | Noted: 2024-08-15

## 2024-08-15 PROCEDURE — 99215 OFFICE O/P EST HI 40 MIN: CPT | Performed by: INTERNAL MEDICINE

## 2024-08-15 PROCEDURE — 3074F SYST BP LT 130 MM HG: CPT | Performed by: INTERNAL MEDICINE

## 2024-08-15 PROCEDURE — 1090F PRES/ABSN URINE INCON ASSESS: CPT | Performed by: INTERNAL MEDICINE

## 2024-08-15 PROCEDURE — G8417 CALC BMI ABV UP PARAM F/U: HCPCS | Performed by: INTERNAL MEDICINE

## 2024-08-15 PROCEDURE — 3078F DIAST BP <80 MM HG: CPT | Performed by: INTERNAL MEDICINE

## 2024-08-15 PROCEDURE — 1123F ACP DISCUSS/DSCN MKR DOCD: CPT | Performed by: INTERNAL MEDICINE

## 2024-08-15 PROCEDURE — G8427 DOCREV CUR MEDS BY ELIG CLIN: HCPCS | Performed by: INTERNAL MEDICINE

## 2024-08-15 PROCEDURE — G8399 PT W/DXA RESULTS DOCUMENT: HCPCS | Performed by: INTERNAL MEDICINE

## 2024-08-15 PROCEDURE — 1036F TOBACCO NON-USER: CPT | Performed by: INTERNAL MEDICINE

## 2024-08-15 RX ORDER — LANSOPRAZOLE 30 MG/1
30 CAPSULE, DELAYED RELEASE ORAL DAILY
Qty: 90 CAPSULE | Refills: 0 | Status: SHIPPED | OUTPATIENT
Start: 2024-08-15

## 2024-08-15 ASSESSMENT — ENCOUNTER SYMPTOMS
CONSTIPATION: 0
WHEEZING: 0
CHEST TIGHTNESS: 0
BACK PAIN: 1
SORE THROAT: 0
ABDOMINAL PAIN: 0
COUGH: 0

## 2024-08-15 NOTE — PROGRESS NOTES
urgency.   Musculoskeletal:  Positive for arthralgias and back pain.   Skin:  Negative for rash.   Neurological:  Negative for dizziness and headaches.   Psychiatric/Behavioral:  Positive for decreased concentration and dysphoric mood.      Vitals:    08/15/24 1506   BP: 110/62   Pulse: 68   SpO2: 94%   Height: 1.575 m (5' 2\")     Body mass index is 28.35 kg/m².    Physical Exam  Constitutional:       Appearance: She is well-developed.   HENT:      Mouth/Throat:      Pharynx: No oropharyngeal exudate.   Eyes:      Conjunctiva/sclera: Conjunctivae normal.      Pupils: Pupils are equal, round, and reactive to light.   Neck:      Thyroid: No thyromegaly.      Vascular: No JVD.   Cardiovascular:      Rate and Rhythm: Normal rate.      Heart sounds: Normal heart sounds. No murmur heard.  Pulmonary:      Effort: No respiratory distress.      Breath sounds: Rhonchi and rales present. No wheezing.   Chest:      Chest wall: No tenderness.   Abdominal:      General: Bowel sounds are normal.      Palpations: Abdomen is soft.   Musculoskeletal:      Cervical back: Neck supple.   Lymphadenopathy:      Cervical: No cervical adenopathy.   Skin:     General: Skin is warm.      Findings: No rash.   Neurological:      Mental Status: She is oriented to person, place, and time.         Lab Review   Orders Only on 08/07/2024   Component Date Value    TSH 08/07/2024 5.340 (H)     Color, UA 08/07/2024 YELLOW     Clarity, UA 08/07/2024 CLOUDY (A)     Glucose, Ur 08/07/2024 Negative     Bilirubin, Urine 08/07/2024 Negative     Ketones, Urine 08/07/2024 Negative     Specific Gravity, UA 08/07/2024 1.017     Blood, Urine 08/07/2024 TRACE (A)     pH, Urine 08/07/2024 6.0     Protein, UA 08/07/2024 30 (A)     Urobilinogen, Urine 08/07/2024 0.2     Nitrite, Urine 08/07/2024 POSITIVE (A)     Leukocyte Esterase, Urine 08/07/2024 MODERATE (A)     Vit D, 25-Hydroxy 08/07/2024 99.2     Cholesterol, Total 08/07/2024 180     Triglycerides 08/07/2024

## 2024-08-23 ENCOUNTER — TRANSCRIBE ORDERS (OUTPATIENT)
Dept: ADMINISTRATIVE | Facility: HOSPITAL | Age: 84
End: 2024-08-23
Payer: MEDICARE

## 2024-08-23 DIAGNOSIS — M25.561 RIGHT KNEE PAIN, UNSPECIFIED CHRONICITY: Primary | ICD-10-CM

## 2024-08-23 DIAGNOSIS — M25.562 LEFT KNEE PAIN, UNSPECIFIED CHRONICITY: ICD-10-CM

## 2024-08-29 ENCOUNTER — TRANSCRIBE ORDERS (OUTPATIENT)
Dept: ADMINISTRATIVE | Facility: HOSPITAL | Age: 84
End: 2024-08-29
Payer: MEDICARE

## 2024-08-29 DIAGNOSIS — R22.43 LOCALIZED SWELLING OF BOTH LOWER LEGS: Primary | ICD-10-CM

## 2024-09-05 ENCOUNTER — HOSPITAL ENCOUNTER (OUTPATIENT)
Dept: ULTRASOUND IMAGING | Facility: HOSPITAL | Age: 84
Discharge: HOME | End: 2024-09-05
Payer: MEDICARE

## 2024-09-05 ENCOUNTER — HOSPITAL ENCOUNTER (OUTPATIENT)
Dept: GENERAL RADIOLOGY | Facility: HOSPITAL | Age: 84
Discharge: HOME | End: 2024-09-05
Payer: MEDICARE

## 2024-09-05 DIAGNOSIS — R22.43 LOCALIZED SWELLING OF BOTH LOWER LEGS: ICD-10-CM

## 2024-09-05 DIAGNOSIS — M25.561 RIGHT KNEE PAIN, UNSPECIFIED CHRONICITY: ICD-10-CM

## 2024-09-05 DIAGNOSIS — M25.562 LEFT KNEE PAIN, UNSPECIFIED CHRONICITY: ICD-10-CM

## 2024-09-05 PROCEDURE — 73560 X-RAY EXAM OF KNEE 1 OR 2: CPT

## 2024-09-11 DIAGNOSIS — G43.909 MIGRAINE SYNDROME: ICD-10-CM

## 2024-09-11 DIAGNOSIS — F33.2 ENDOGENOUS DEPRESSION (HCC): ICD-10-CM

## 2024-09-11 DIAGNOSIS — G30.9 ALZHEIMER'S DISEASE, UNSPECIFIED (CODE) (HCC): ICD-10-CM

## 2024-09-11 DIAGNOSIS — R79.89 ABNORMAL CBC: ICD-10-CM

## 2024-09-11 DIAGNOSIS — M51.26 LUMBAR DISCOGENIC PAIN SYNDROME: ICD-10-CM

## 2024-09-11 DIAGNOSIS — N32.81 OAB (OVERACTIVE BLADDER): ICD-10-CM

## 2024-09-11 DIAGNOSIS — E78.2 MIXED HYPERLIPIDEMIA: ICD-10-CM

## 2024-09-11 DIAGNOSIS — I10 PRIMARY HYPERTENSION: ICD-10-CM

## 2024-09-11 DIAGNOSIS — F51.01 PRIMARY INSOMNIA: ICD-10-CM

## 2024-09-11 DIAGNOSIS — E03.9 HYPOTHYROIDISM (ACQUIRED): ICD-10-CM

## 2024-09-11 DIAGNOSIS — I25.10 CORONARY ARTERY DISEASE INVOLVING NATIVE CORONARY ARTERY OF NATIVE HEART WITHOUT ANGINA PECTORIS: ICD-10-CM

## 2024-09-11 DIAGNOSIS — D63.8 CHRONIC DISEASE ANEMIA: ICD-10-CM

## 2024-09-11 DIAGNOSIS — E55.9 VITAMIN D DEFICIENCY: ICD-10-CM

## 2024-09-11 DIAGNOSIS — N18.32 STAGE 3B CHRONIC KIDNEY DISEASE (HCC): ICD-10-CM

## 2024-09-11 LAB
ALBUMIN SERPL-MCNC: 3.5 G/DL (ref 3.5–5.2)
ALP SERPL-CCNC: 77 U/L (ref 35–104)
ALT SERPL-CCNC: 5 U/L (ref 5–33)
ANION GAP SERPL CALCULATED.3IONS-SCNC: 11 MMOL/L (ref 7–19)
AST SERPL-CCNC: 12 U/L (ref 5–32)
BASOPHILS # BLD: 0 K/UL (ref 0–0.2)
BASOPHILS NFR BLD: 0.5 % (ref 0–1)
BILIRUB SERPL-MCNC: 0.2 MG/DL (ref 0.2–1.2)
BUN SERPL-MCNC: 37 MG/DL (ref 8–23)
CALCIUM SERPL-MCNC: 9.2 MG/DL (ref 8.8–10.2)
CHLORIDE SERPL-SCNC: 105 MMOL/L (ref 98–111)
CO2 SERPL-SCNC: 24 MMOL/L (ref 22–29)
CREAT SERPL-MCNC: 1.7 MG/DL (ref 0.5–0.9)
EOSINOPHIL # BLD: 0.4 K/UL (ref 0–0.6)
EOSINOPHIL NFR BLD: 6.6 % (ref 0–5)
ERYTHROCYTE [DISTWIDTH] IN BLOOD BY AUTOMATED COUNT: 13 % (ref 11.5–14.5)
FERRITIN SERPL-MCNC: 26.6 NG/ML (ref 13–150)
GLUCOSE SERPL-MCNC: 99 MG/DL (ref 70–99)
HCT VFR BLD AUTO: 30 % (ref 37–47)
HGB BLD-MCNC: 9.3 G/DL (ref 12–16)
IMM GRANULOCYTES # BLD: 0 K/UL
LYMPHOCYTES # BLD: 1.5 K/UL (ref 1.1–4.5)
LYMPHOCYTES NFR BLD: 25.5 % (ref 20–40)
MCH RBC QN AUTO: 29.4 PG (ref 27–31)
MCHC RBC AUTO-ENTMCNC: 31 G/DL (ref 33–37)
MCV RBC AUTO: 94.9 FL (ref 81–99)
MONOCYTES # BLD: 0.6 K/UL (ref 0–0.9)
MONOCYTES NFR BLD: 10.8 % (ref 0–10)
NEUTROPHILS # BLD: 3.3 K/UL (ref 1.5–7.5)
NEUTS SEG NFR BLD: 56.3 % (ref 50–65)
PLATELET # BLD AUTO: 131 K/UL (ref 130–400)
PMV BLD AUTO: 11 FL (ref 9.4–12.3)
POTASSIUM SERPL-SCNC: 5.1 MMOL/L (ref 3.5–5)
PROT SERPL-MCNC: 6.5 G/DL (ref 6.4–8.3)
RBC # BLD AUTO: 3.16 M/UL (ref 4.2–5.4)
SODIUM SERPL-SCNC: 140 MMOL/L (ref 136–145)
T4 FREE SERPL-MCNC: 1.11 NG/DL (ref 0.93–1.7)
TSH SERPL DL<=0.005 MIU/L-ACNC: 3.54 UIU/ML (ref 0.27–4.2)
VIT B12 SERPL-MCNC: 290 PG/ML (ref 232–1245)
WBC # BLD AUTO: 5.9 K/UL (ref 4.8–10.8)

## 2024-09-19 ENCOUNTER — TELEPHONE (OUTPATIENT)
Dept: INTERNAL MEDICINE | Age: 84
End: 2024-09-19

## 2024-10-05 ENCOUNTER — APPOINTMENT (OUTPATIENT)
Dept: CT IMAGING | Facility: HOSPITAL | Age: 84
End: 2024-10-05
Payer: MEDICARE

## 2024-10-05 ENCOUNTER — APPOINTMENT (OUTPATIENT)
Dept: GENERAL RADIOLOGY | Facility: HOSPITAL | Age: 84
End: 2024-10-05
Payer: MEDICARE

## 2024-10-05 ENCOUNTER — HOSPITAL ENCOUNTER (INPATIENT)
Facility: HOSPITAL | Age: 84
LOS: 10 days | Discharge: NURSING, REHAB OR LONG-TERM CARE | End: 2024-10-15
Attending: FAMILY MEDICINE | Admitting: INTERNAL MEDICINE
Payer: MEDICARE

## 2024-10-05 DIAGNOSIS — A41.9 SEPSIS WITHOUT ACUTE ORGAN DYSFUNCTION, DUE TO UNSPECIFIED ORGANISM: Primary | ICD-10-CM

## 2024-10-05 DIAGNOSIS — G89.4 CHRONIC PAIN SYNDROME: Chronic | ICD-10-CM

## 2024-10-05 DIAGNOSIS — K52.9 GASTROENTERITIS: ICD-10-CM

## 2024-10-05 DIAGNOSIS — J96.01 ACUTE RESPIRATORY FAILURE WITH HYPOXIA: ICD-10-CM

## 2024-10-05 DIAGNOSIS — R13.10 DYSPHAGIA, UNSPECIFIED TYPE: ICD-10-CM

## 2024-10-05 DIAGNOSIS — N30.00 ACUTE CYSTITIS WITHOUT HEMATURIA: ICD-10-CM

## 2024-10-05 DIAGNOSIS — G93.41 ACUTE METABOLIC ENCEPHALOPATHY: ICD-10-CM

## 2024-10-05 DIAGNOSIS — Z74.09 IMPAIRED MOBILITY: ICD-10-CM

## 2024-10-05 PROBLEM — N39.0 SEPSIS DUE TO URINARY TRACT INFECTION: Status: ACTIVE | Noted: 2024-10-05

## 2024-10-05 LAB
ALBUMIN SERPL-MCNC: 2.5 G/DL (ref 3.5–5.2)
ALBUMIN SERPL-MCNC: 2.7 G/DL (ref 3.5–5.2)
ALBUMIN SERPL-MCNC: 3.1 G/DL (ref 3.5–5.2)
ALBUMIN/GLOB SERPL: 0.6 G/DL
ALBUMIN/GLOB SERPL: 0.8 G/DL
ALP SERPL-CCNC: 70 U/L (ref 39–117)
ALP SERPL-CCNC: 83 U/L (ref 39–117)
ALT SERPL W P-5'-P-CCNC: 12 U/L (ref 1–33)
ALT SERPL W P-5'-P-CCNC: 20 U/L (ref 1–33)
ANION GAP SERPL CALCULATED.3IONS-SCNC: 11 MMOL/L (ref 5–15)
ANION GAP SERPL CALCULATED.3IONS-SCNC: 14 MMOL/L (ref 5–15)
ANION GAP SERPL CALCULATED.3IONS-SCNC: 16 MMOL/L (ref 5–15)
ARTERIAL PATENCY WRIST A: POSITIVE
ARTERIAL PATENCY WRIST A: POSITIVE
AST SERPL-CCNC: 19 U/L (ref 1–32)
AST SERPL-CCNC: 48 U/L (ref 1–32)
ATMOSPHERIC PRESS: 757 MMHG
ATMOSPHERIC PRESS: 757 MMHG
BACTERIA BLD CULT: ABNORMAL
BACTERIA UR QL AUTO: ABNORMAL /HPF
BASE EXCESS BLDA CALC-SCNC: -0.4 MMOL/L (ref 0–2)
BASE EXCESS BLDA CALC-SCNC: -4.8 MMOL/L (ref 0–2)
BASOPHILS # BLD AUTO: 0.02 10*3/MM3 (ref 0–0.2)
BASOPHILS # BLD AUTO: 0.03 10*3/MM3 (ref 0–0.2)
BASOPHILS NFR BLD AUTO: 0.1 % (ref 0–1.5)
BASOPHILS NFR BLD AUTO: 0.1 % (ref 0–1.5)
BDY SITE: ABNORMAL
BDY SITE: ABNORMAL
BILIRUB SERPL-MCNC: 0.5 MG/DL (ref 0–1.2)
BILIRUB SERPL-MCNC: 0.5 MG/DL (ref 0–1.2)
BILIRUB UR QL STRIP: NEGATIVE
BODY TEMPERATURE: 37
BODY TEMPERATURE: 37
BOTTLE TYPE: ABNORMAL
BUN SERPL-MCNC: 43 MG/DL (ref 8–23)
BUN SERPL-MCNC: 45 MG/DL (ref 8–23)
BUN SERPL-MCNC: 47 MG/DL (ref 8–23)
BUN/CREAT SERPL: 20.2 (ref 7–25)
BUN/CREAT SERPL: 20.5 (ref 7–25)
BUN/CREAT SERPL: 21.6 (ref 7–25)
CALCIUM SPEC-SCNC: 8.5 MG/DL (ref 8.6–10.5)
CALCIUM SPEC-SCNC: 8.7 MG/DL (ref 8.6–10.5)
CALCIUM SPEC-SCNC: 9 MG/DL (ref 8.6–10.5)
CHLORIDE SERPL-SCNC: 102 MMOL/L (ref 98–107)
CHLORIDE SERPL-SCNC: 97 MMOL/L (ref 98–107)
CHLORIDE SERPL-SCNC: 99 MMOL/L (ref 98–107)
CLARITY UR: ABNORMAL
CO2 SERPL-SCNC: 20 MMOL/L (ref 22–29)
CO2 SERPL-SCNC: 22 MMOL/L (ref 22–29)
CO2 SERPL-SCNC: 23 MMOL/L (ref 22–29)
COLOR UR: YELLOW
CREAT SERPL-MCNC: 2.13 MG/DL (ref 0.57–1)
CREAT SERPL-MCNC: 2.18 MG/DL (ref 0.57–1)
CREAT SERPL-MCNC: 2.19 MG/DL (ref 0.57–1)
D-LACTATE SERPL-SCNC: 1.8 MMOL/L (ref 0.5–2)
DEPRECATED RDW RBC AUTO: 46.2 FL (ref 37–54)
DEPRECATED RDW RBC AUTO: 50.1 FL (ref 37–54)
DEPRECATED RDW RBC AUTO: 53.4 FL (ref 37–54)
EGFRCR SERPLBLD CKD-EPI 2021: 22 ML/MIN/1.73
EGFRCR SERPLBLD CKD-EPI 2021: 22.1 ML/MIN/1.73
EGFRCR SERPLBLD CKD-EPI 2021: 22.7 ML/MIN/1.73
EOSINOPHIL # BLD AUTO: 0 10*3/MM3 (ref 0–0.4)
EOSINOPHIL # BLD AUTO: 0 10*3/MM3 (ref 0–0.4)
EOSINOPHIL NFR BLD AUTO: 0 % (ref 0.3–6.2)
EOSINOPHIL NFR BLD AUTO: 0 % (ref 0.3–6.2)
ERYTHROCYTE [DISTWIDTH] IN BLOOD BY AUTOMATED COUNT: 14.3 % (ref 12.3–15.4)
ERYTHROCYTE [DISTWIDTH] IN BLOOD BY AUTOMATED COUNT: 14.3 % (ref 12.3–15.4)
ERYTHROCYTE [DISTWIDTH] IN BLOOD BY AUTOMATED COUNT: 14.4 % (ref 12.3–15.4)
GEN 5 2HR TROPONIN T REFLEX: 71 NG/L
GLOBULIN UR ELPH-MCNC: 4.1 GM/DL
GLOBULIN UR ELPH-MCNC: 4.3 GM/DL
GLUCOSE BLDC GLUCOMTR-MCNC: 109 MG/DL (ref 70–130)
GLUCOSE BLDC GLUCOMTR-MCNC: 115 MG/DL (ref 70–130)
GLUCOSE BLDC GLUCOMTR-MCNC: 178 MG/DL (ref 70–130)
GLUCOSE SERPL-MCNC: 165 MG/DL (ref 65–99)
GLUCOSE SERPL-MCNC: 179 MG/DL (ref 65–99)
GLUCOSE SERPL-MCNC: 187 MG/DL (ref 65–99)
GLUCOSE UR STRIP-MCNC: NEGATIVE MG/DL
HCO3 BLDA-SCNC: 22.5 MMOL/L (ref 20–26)
HCO3 BLDA-SCNC: 25.1 MMOL/L (ref 20–26)
HCT VFR BLD AUTO: 22.8 % (ref 34–46.6)
HCT VFR BLD AUTO: 23.3 % (ref 34–46.6)
HCT VFR BLD AUTO: 28.3 % (ref 34–46.6)
HGB BLD-MCNC: 7.1 G/DL (ref 12–15.9)
HGB BLD-MCNC: 7.4 G/DL (ref 12–15.9)
HGB BLD-MCNC: 8.1 G/DL (ref 12–15.9)
HGB UR QL STRIP.AUTO: ABNORMAL
HOLD SPECIMEN: NORMAL
HYALINE CASTS UR QL AUTO: ABNORMAL /LPF
IMM GRANULOCYTES # BLD AUTO: 0.21 10*3/MM3 (ref 0–0.05)
IMM GRANULOCYTES # BLD AUTO: 0.67 10*3/MM3 (ref 0–0.05)
IMM GRANULOCYTES NFR BLD AUTO: 0.9 % (ref 0–0.5)
IMM GRANULOCYTES NFR BLD AUTO: 3.3 % (ref 0–0.5)
INHALED O2 CONCENTRATION: 100 %
INHALED O2 CONCENTRATION: 40 %
KETONES UR QL STRIP: NEGATIVE
LEUKOCYTE ESTERASE UR QL STRIP.AUTO: ABNORMAL
LIPASE SERPL-CCNC: 9 U/L (ref 13–60)
LYMPHOCYTES # BLD AUTO: 0.55 10*3/MM3 (ref 0.7–3.1)
LYMPHOCYTES # BLD AUTO: 0.68 10*3/MM3 (ref 0.7–3.1)
LYMPHOCYTES NFR BLD AUTO: 2.7 % (ref 19.6–45.3)
LYMPHOCYTES NFR BLD AUTO: 3 % (ref 19.6–45.3)
Lab: ABNORMAL
Lab: ABNORMAL
MAGNESIUM SERPL-MCNC: 1.6 MG/DL (ref 1.6–2.4)
MCH RBC QN AUTO: 29.2 PG (ref 26.6–33)
MCH RBC QN AUTO: 29.3 PG (ref 26.6–33)
MCH RBC QN AUTO: 29.4 PG (ref 26.6–33)
MCHC RBC AUTO-ENTMCNC: 28.6 G/DL (ref 31.5–35.7)
MCHC RBC AUTO-ENTMCNC: 30.5 G/DL (ref 31.5–35.7)
MCHC RBC AUTO-ENTMCNC: 32.5 G/DL (ref 31.5–35.7)
MCV RBC AUTO: 102.5 FL (ref 79–97)
MCV RBC AUTO: 90.5 FL (ref 79–97)
MCV RBC AUTO: 95.9 FL (ref 79–97)
MODALITY: ABNORMAL
MODALITY: ABNORMAL
MONOCYTES # BLD AUTO: 0.87 10*3/MM3 (ref 0.1–0.9)
MONOCYTES # BLD AUTO: 1.21 10*3/MM3 (ref 0.1–0.9)
MONOCYTES NFR BLD AUTO: 4.3 % (ref 5–12)
MONOCYTES NFR BLD AUTO: 5.3 % (ref 5–12)
NEUTROPHILS NFR BLD AUTO: 17.93 10*3/MM3 (ref 1.7–7)
NEUTROPHILS NFR BLD AUTO: 20.52 10*3/MM3 (ref 1.7–7)
NEUTROPHILS NFR BLD AUTO: 89.6 % (ref 42.7–76)
NEUTROPHILS NFR BLD AUTO: 90.7 % (ref 42.7–76)
NITRITE UR QL STRIP: NEGATIVE
NRBC BLD AUTO-RTO: 0 /100 WBC (ref 0–0.2)
NRBC BLD AUTO-RTO: 0 /100 WBC (ref 0–0.2)
PCO2 BLDA: 44.2 MM HG (ref 35–45)
PCO2 BLDA: 53.1 MM HG (ref 35–45)
PCO2 TEMP ADJ BLD: 44.2 MM HG (ref 35–45)
PCO2 TEMP ADJ BLD: 53.1 MM HG (ref 35–45)
PEEP RESPIRATORY: 5 CM[H2O]
PEEP RESPIRATORY: 5 CM[H2O]
PH BLDA: 7.24 PH UNITS (ref 7.35–7.45)
PH BLDA: 7.36 PH UNITS (ref 7.35–7.45)
PH UR STRIP.AUTO: 7.5 [PH] (ref 5–8)
PH, TEMP CORRECTED: 7.24 PH UNITS (ref 7.35–7.45)
PH, TEMP CORRECTED: 7.36 PH UNITS (ref 7.35–7.45)
PHOSPHATE SERPL-MCNC: 3.4 MG/DL (ref 2.5–4.5)
PHOSPHATE SERPL-MCNC: 4 MG/DL (ref 2.5–4.5)
PLATELET # BLD AUTO: 166 10*3/MM3 (ref 140–450)
PLATELET # BLD AUTO: 171 10*3/MM3 (ref 140–450)
PLATELET # BLD AUTO: 213 10*3/MM3 (ref 140–450)
PMV BLD AUTO: 10.9 FL (ref 6–12)
PMV BLD AUTO: 11.2 FL (ref 6–12)
PMV BLD AUTO: 11.3 FL (ref 6–12)
PO2 BLD: 318 MM[HG] (ref 0–500)
PO2 BLD: 322 MM[HG] (ref 0–500)
PO2 BLDA: 127 MM HG (ref 83–108)
PO2 BLDA: 322 MM HG (ref 83–108)
PO2 TEMP ADJ BLD: 127 MM HG (ref 83–108)
PO2 TEMP ADJ BLD: 322 MM HG (ref 83–108)
POTASSIUM SERPL-SCNC: 3.5 MMOL/L (ref 3.5–5.2)
POTASSIUM SERPL-SCNC: 4 MMOL/L (ref 3.5–5.2)
POTASSIUM SERPL-SCNC: 4.5 MMOL/L (ref 3.5–5.2)
PROCALCITONIN SERPL-MCNC: 65.13 NG/ML (ref 0–0.25)
PROT SERPL-MCNC: 7 G/DL (ref 6–8.5)
PROT SERPL-MCNC: 7.2 G/DL (ref 6–8.5)
PROT UR QL STRIP: ABNORMAL
RBC # BLD AUTO: 2.43 10*6/MM3 (ref 3.77–5.28)
RBC # BLD AUTO: 2.52 10*6/MM3 (ref 3.77–5.28)
RBC # BLD AUTO: 2.76 10*6/MM3 (ref 3.77–5.28)
RBC # UR STRIP: ABNORMAL /HPF
REF LAB TEST METHOD: ABNORMAL
SAO2 % BLDCOA: 99.5 % (ref 94–99)
SAO2 % BLDCOA: >100.1 % (ref 94–99)
SET MECH RESP RATE: 14
SET MECH RESP RATE: 20
SODIUM SERPL-SCNC: 133 MMOL/L (ref 136–145)
SODIUM SERPL-SCNC: 135 MMOL/L (ref 136–145)
SODIUM SERPL-SCNC: 136 MMOL/L (ref 136–145)
SP GR UR STRIP: 1.01 (ref 1–1.03)
SQUAMOUS #/AREA URNS HPF: ABNORMAL /HPF
TROPONIN T DELTA: -2 NG/L
TROPONIN T SERPL HS-MCNC: 73 NG/L
UROBILINOGEN UR QL STRIP: ABNORMAL
VENTILATOR MODE: AC
VENTILATOR MODE: AC
VT ON VENT VENT: 400 ML
VT ON VENT VENT: 400 ML
WBC # UR STRIP: ABNORMAL /HPF
WBC CLUMPS # UR AUTO: ABNORMAL /HPF
WBC NRBC COR # BLD AUTO: 20.05 10*3/MM3 (ref 3.4–10.8)
WBC NRBC COR # BLD AUTO: 22.64 10*3/MM3 (ref 3.4–10.8)
WBC NRBC COR # BLD AUTO: 25.39 10*3/MM3 (ref 3.4–10.8)
WHOLE BLOOD HOLD COAG: NORMAL
WHOLE BLOOD HOLD SPECIMEN: NORMAL

## 2024-10-05 PROCEDURE — 83605 ASSAY OF LACTIC ACID: CPT | Performed by: FAMILY MEDICINE

## 2024-10-05 PROCEDURE — 94799 UNLISTED PULMONARY SVC/PX: CPT

## 2024-10-05 PROCEDURE — 87040 BLOOD CULTURE FOR BACTERIA: CPT | Performed by: FAMILY MEDICINE

## 2024-10-05 PROCEDURE — 85027 COMPLETE CBC AUTOMATED: CPT | Performed by: INTERNAL MEDICINE

## 2024-10-05 PROCEDURE — 84145 PROCALCITONIN (PCT): CPT | Performed by: INTERNAL MEDICINE

## 2024-10-05 PROCEDURE — 25010000002 MAGNESIUM SULFATE 2 GM/50ML SOLUTION: Performed by: PHYSICIAN ASSISTANT

## 2024-10-05 PROCEDURE — 74018 RADEX ABDOMEN 1 VIEW: CPT

## 2024-10-05 PROCEDURE — 87186 SC STD MICRODIL/AGAR DIL: CPT | Performed by: FAMILY MEDICINE

## 2024-10-05 PROCEDURE — 83690 ASSAY OF LIPASE: CPT | Performed by: FAMILY MEDICINE

## 2024-10-05 PROCEDURE — 93005 ELECTROCARDIOGRAM TRACING: CPT | Performed by: FAMILY MEDICINE

## 2024-10-05 PROCEDURE — 81001 URINALYSIS AUTO W/SCOPE: CPT | Performed by: FAMILY MEDICINE

## 2024-10-05 PROCEDURE — 84100 ASSAY OF PHOSPHORUS: CPT | Performed by: FAMILY MEDICINE

## 2024-10-05 PROCEDURE — 87086 URINE CULTURE/COLONY COUNT: CPT | Performed by: FAMILY MEDICINE

## 2024-10-05 PROCEDURE — 84484 ASSAY OF TROPONIN QUANT: CPT | Performed by: INTERNAL MEDICINE

## 2024-10-05 PROCEDURE — 25010000002 CEFEPIME PER 500 MG: Performed by: FAMILY MEDICINE

## 2024-10-05 PROCEDURE — 94003 VENT MGMT INPAT SUBQ DAY: CPT

## 2024-10-05 PROCEDURE — P9612 CATHETERIZE FOR URINE SPEC: HCPCS

## 2024-10-05 PROCEDURE — 25810000003 SODIUM CHLORIDE 0.9 % SOLUTION: Performed by: FAMILY MEDICINE

## 2024-10-05 PROCEDURE — 85025 COMPLETE CBC W/AUTO DIFF WBC: CPT | Performed by: FAMILY MEDICINE

## 2024-10-05 PROCEDURE — 25010000002 MIDAZOLAM 50 MG/10ML SOLUTION 10 ML VIAL: Performed by: FAMILY MEDICINE

## 2024-10-05 PROCEDURE — 25010000002 PROPOFOL 10 MG/ML EMULSION

## 2024-10-05 PROCEDURE — 25010000002 PIPERACILLIN SOD-TAZOBACTAM PER 1 G: Performed by: FAMILY MEDICINE

## 2024-10-05 PROCEDURE — 82803 BLOOD GASES ANY COMBINATION: CPT

## 2024-10-05 PROCEDURE — 93010 ELECTROCARDIOGRAM REPORT: CPT | Performed by: STUDENT IN AN ORGANIZED HEALTH CARE EDUCATION/TRAINING PROGRAM

## 2024-10-05 PROCEDURE — 70450 CT HEAD/BRAIN W/O DYE: CPT

## 2024-10-05 PROCEDURE — 82948 REAGENT STRIP/BLOOD GLUCOSE: CPT

## 2024-10-05 PROCEDURE — 83735 ASSAY OF MAGNESIUM: CPT | Performed by: FAMILY MEDICINE

## 2024-10-05 PROCEDURE — 5A12012 PERFORMANCE OF CARDIAC OUTPUT, SINGLE, MANUAL: ICD-10-PCS | Performed by: FAMILY MEDICINE

## 2024-10-05 PROCEDURE — 87077 CULTURE AEROBIC IDENTIFY: CPT | Performed by: FAMILY MEDICINE

## 2024-10-05 PROCEDURE — 25010000002 HEPARIN (PORCINE) PER 1000 UNITS: Performed by: FAMILY MEDICINE

## 2024-10-05 PROCEDURE — 25010000002 PROPOFOL 10 MG/ML EMULSION: Performed by: INTERNAL MEDICINE

## 2024-10-05 PROCEDURE — 87154 CUL TYP ID BLD PTHGN 6+ TRGT: CPT | Performed by: FAMILY MEDICINE

## 2024-10-05 PROCEDURE — 92950 HEART/LUNG RESUSCITATION CPR: CPT

## 2024-10-05 PROCEDURE — 25010000002 HEPARIN (PORCINE) PER 1000 UNITS: Performed by: INTERNAL MEDICINE

## 2024-10-05 PROCEDURE — 36600 WITHDRAWAL OF ARTERIAL BLOOD: CPT

## 2024-10-05 PROCEDURE — 94761 N-INVAS EAR/PLS OXIMETRY MLT: CPT

## 2024-10-05 PROCEDURE — 94002 VENT MGMT INPAT INIT DAY: CPT

## 2024-10-05 PROCEDURE — 71045 X-RAY EXAM CHEST 1 VIEW: CPT

## 2024-10-05 PROCEDURE — 0BH17EZ INSERTION OF ENDOTRACHEAL AIRWAY INTO TRACHEA, VIA NATURAL OR ARTIFICIAL OPENING: ICD-10-PCS | Performed by: FAMILY MEDICINE

## 2024-10-05 PROCEDURE — 80053 COMPREHEN METABOLIC PANEL: CPT | Performed by: FAMILY MEDICINE

## 2024-10-05 PROCEDURE — 25010000002 EPINEPHRINE 1 MG/10ML SOLUTION PREFILLED SYRINGE: Performed by: FAMILY MEDICINE

## 2024-10-05 PROCEDURE — 25010000002 FUROSEMIDE PER 20 MG

## 2024-10-05 PROCEDURE — 93005 ELECTROCARDIOGRAM TRACING: CPT | Performed by: INTERNAL MEDICINE

## 2024-10-05 PROCEDURE — 36415 COLL VENOUS BLD VENIPUNCTURE: CPT | Performed by: FAMILY MEDICINE

## 2024-10-05 PROCEDURE — 74176 CT ABD & PELVIS W/O CONTRAST: CPT

## 2024-10-05 PROCEDURE — 25010000002 HYDROMORPHONE PER 4 MG: Performed by: INTERNAL MEDICINE

## 2024-10-05 PROCEDURE — 99285 EMERGENCY DEPT VISIT HI MDM: CPT

## 2024-10-05 PROCEDURE — 5A1945Z RESPIRATORY VENTILATION, 24-96 CONSECUTIVE HOURS: ICD-10-PCS | Performed by: INTERNAL MEDICINE

## 2024-10-05 RX ORDER — BISACODYL 10 MG
10 SUPPOSITORY, RECTAL RECTAL DAILY PRN
Status: CANCELLED | OUTPATIENT
Start: 2024-10-05

## 2024-10-05 RX ORDER — SODIUM CHLORIDE 0.9 % (FLUSH) 0.9 %
10 SYRINGE (ML) INJECTION EVERY 12 HOURS SCHEDULED
Status: CANCELLED | OUTPATIENT
Start: 2024-10-05

## 2024-10-05 RX ORDER — SODIUM CHLORIDE 0.9 % (FLUSH) 0.9 %
10 SYRINGE (ML) INJECTION AS NEEDED
Status: DISCONTINUED | OUTPATIENT
Start: 2024-10-05 | End: 2024-10-15 | Stop reason: HOSPADM

## 2024-10-05 RX ORDER — ONDANSETRON 4 MG/1
4 TABLET, ORALLY DISINTEGRATING ORAL EVERY 6 HOURS PRN
Status: DISCONTINUED | OUTPATIENT
Start: 2024-10-05 | End: 2024-10-07

## 2024-10-05 RX ORDER — PANTOPRAZOLE SODIUM 40 MG/10ML
40 INJECTION, POWDER, LYOPHILIZED, FOR SOLUTION INTRAVENOUS
Status: DISCONTINUED | OUTPATIENT
Start: 2024-10-05 | End: 2024-10-07

## 2024-10-05 RX ORDER — POLYETHYLENE GLYCOL 3350 17 G/17G
17 POWDER, FOR SOLUTION ORAL DAILY PRN
Status: CANCELLED | OUTPATIENT
Start: 2024-10-05

## 2024-10-05 RX ORDER — AMOXICILLIN 250 MG
2 CAPSULE ORAL 2 TIMES DAILY PRN
Status: DISCONTINUED | OUTPATIENT
Start: 2024-10-05 | End: 2024-10-10

## 2024-10-05 RX ORDER — HYDROMORPHONE HYDROCHLORIDE 1 MG/ML
0.5 INJECTION, SOLUTION INTRAMUSCULAR; INTRAVENOUS; SUBCUTANEOUS
Status: DISCONTINUED | OUTPATIENT
Start: 2024-10-05 | End: 2024-10-07

## 2024-10-05 RX ORDER — ROCURONIUM BROMIDE 10 MG/ML
INJECTION, SOLUTION INTRAVENOUS
Status: COMPLETED | OUTPATIENT
Start: 2024-10-05 | End: 2024-10-05

## 2024-10-05 RX ORDER — OXYCODONE AND ACETAMINOPHEN 5; 325 MG/1; MG/1
1 TABLET ORAL EVERY 4 HOURS PRN
Status: DISCONTINUED | OUTPATIENT
Start: 2024-10-05 | End: 2024-10-07

## 2024-10-05 RX ORDER — ACETAMINOPHEN 325 MG/1
650 TABLET ORAL EVERY 4 HOURS PRN
Status: DISCONTINUED | OUTPATIENT
Start: 2024-10-05 | End: 2024-10-15 | Stop reason: HOSPADM

## 2024-10-05 RX ORDER — ACETAMINOPHEN 650 MG/1
650 SUPPOSITORY RECTAL EVERY 4 HOURS PRN
Status: DISCONTINUED | OUTPATIENT
Start: 2024-10-05 | End: 2024-10-15 | Stop reason: HOSPADM

## 2024-10-05 RX ORDER — SODIUM CHLORIDE 0.9 % (FLUSH) 0.9 %
10 SYRINGE (ML) INJECTION AS NEEDED
Status: CANCELLED | OUTPATIENT
Start: 2024-10-05

## 2024-10-05 RX ORDER — CARVEDILOL 6.25 MG/1
12.5 TABLET ORAL EVERY 12 HOURS SCHEDULED
Status: DISCONTINUED | OUTPATIENT
Start: 2024-10-05 | End: 2024-10-15 | Stop reason: HOSPADM

## 2024-10-05 RX ORDER — OXYCODONE HYDROCHLORIDE 5 MG/1
10 TABLET ORAL EVERY 8 HOURS
Status: DISCONTINUED | OUTPATIENT
Start: 2024-10-05 | End: 2024-10-07

## 2024-10-05 RX ORDER — BISACODYL 10 MG
10 SUPPOSITORY, RECTAL RECTAL DAILY PRN
Status: DISCONTINUED | OUTPATIENT
Start: 2024-10-05 | End: 2024-10-10

## 2024-10-05 RX ORDER — MAGNESIUM SULFATE HEPTAHYDRATE 40 MG/ML
2 INJECTION, SOLUTION INTRAVENOUS
Status: DISCONTINUED | OUTPATIENT
Start: 2024-10-05 | End: 2024-10-05

## 2024-10-05 RX ORDER — BISACODYL 5 MG/1
5 TABLET, DELAYED RELEASE ORAL DAILY PRN
Status: CANCELLED | OUTPATIENT
Start: 2024-10-05

## 2024-10-05 RX ORDER — AMOXICILLIN 250 MG
2 CAPSULE ORAL 2 TIMES DAILY
Status: CANCELLED | OUTPATIENT
Start: 2024-10-05

## 2024-10-05 RX ORDER — HEPARIN SODIUM 5000 [USP'U]/ML
5000 INJECTION, SOLUTION INTRAVENOUS; SUBCUTANEOUS EVERY 8 HOURS SCHEDULED
Status: DISCONTINUED | OUTPATIENT
Start: 2024-10-05 | End: 2024-10-06

## 2024-10-05 RX ORDER — SODIUM CHLORIDE 9 MG/ML
40 INJECTION, SOLUTION INTRAVENOUS AS NEEDED
Status: CANCELLED | OUTPATIENT
Start: 2024-10-05

## 2024-10-05 RX ORDER — CHLORHEXIDINE GLUCONATE 500 MG/1
1 CLOTH TOPICAL EVERY 24 HOURS
Status: CANCELLED | OUTPATIENT
Start: 2024-10-06

## 2024-10-05 RX ORDER — SODIUM CHLORIDE 9 MG/ML
75 INJECTION, SOLUTION INTRAVENOUS CONTINUOUS
Status: DISCONTINUED | OUTPATIENT
Start: 2024-10-05 | End: 2024-10-07

## 2024-10-05 RX ORDER — CHLORHEXIDINE GLUCONATE 500 MG/1
1 CLOTH TOPICAL EVERY 24 HOURS
Status: DISCONTINUED | OUTPATIENT
Start: 2024-10-06 | End: 2024-10-09 | Stop reason: HOSPADM

## 2024-10-05 RX ORDER — BISACODYL 5 MG/1
5 TABLET, DELAYED RELEASE ORAL DAILY PRN
Status: DISCONTINUED | OUTPATIENT
Start: 2024-10-05 | End: 2024-10-05

## 2024-10-05 RX ORDER — POLYETHYLENE GLYCOL 3350 17 G/17G
17 POWDER, FOR SOLUTION ORAL DAILY PRN
Status: DISCONTINUED | OUTPATIENT
Start: 2024-10-05 | End: 2024-10-05

## 2024-10-05 RX ORDER — FUROSEMIDE 10 MG/ML
40 INJECTION INTRAMUSCULAR; INTRAVENOUS ONCE
Status: COMPLETED | OUTPATIENT
Start: 2024-10-05 | End: 2024-10-05

## 2024-10-05 RX ORDER — HEPARIN SODIUM 5000 [USP'U]/ML
5000 INJECTION, SOLUTION INTRAVENOUS; SUBCUTANEOUS EVERY 12 HOURS SCHEDULED
Status: DISCONTINUED | OUTPATIENT
Start: 2024-10-05 | End: 2024-10-05

## 2024-10-05 RX ORDER — CHLORHEXIDINE GLUCONATE 500 MG/1
1 CLOTH TOPICAL ONCE
Status: COMPLETED | OUTPATIENT
Start: 2024-10-05 | End: 2024-10-05

## 2024-10-05 RX ORDER — ONDANSETRON 2 MG/ML
4 INJECTION INTRAMUSCULAR; INTRAVENOUS EVERY 6 HOURS PRN
Status: DISCONTINUED | OUTPATIENT
Start: 2024-10-05 | End: 2024-10-07

## 2024-10-05 RX ORDER — SODIUM CHLORIDE 0.9 % (FLUSH) 0.9 %
10 SYRINGE (ML) INJECTION AS NEEDED
Status: DISCONTINUED | OUTPATIENT
Start: 2024-10-05 | End: 2024-10-11 | Stop reason: SDUPTHER

## 2024-10-05 RX ORDER — AMOXICILLIN 250 MG
2 CAPSULE ORAL 2 TIMES DAILY
Status: DISCONTINUED | OUTPATIENT
Start: 2024-10-05 | End: 2024-10-05

## 2024-10-05 RX ORDER — BISACODYL 5 MG/1
5 TABLET, DELAYED RELEASE ORAL DAILY PRN
Status: DISCONTINUED | OUTPATIENT
Start: 2024-10-05 | End: 2024-10-10

## 2024-10-05 RX ORDER — ETOMIDATE 2 MG/ML
INJECTION INTRAVENOUS
Status: COMPLETED | OUTPATIENT
Start: 2024-10-05 | End: 2024-10-05

## 2024-10-05 RX ORDER — CHLORHEXIDINE GLUCONATE 500 MG/1
1 CLOTH TOPICAL ONCE
Status: CANCELLED | OUTPATIENT
Start: 2024-10-05 | End: 2024-10-05

## 2024-10-05 RX ORDER — ACETAMINOPHEN 160 MG/5ML
650 SOLUTION ORAL EVERY 4 HOURS PRN
Status: DISCONTINUED | OUTPATIENT
Start: 2024-10-05 | End: 2024-10-15 | Stop reason: HOSPADM

## 2024-10-05 RX ORDER — SODIUM CHLORIDE 0.9 % (FLUSH) 0.9 %
10 SYRINGE (ML) INJECTION EVERY 12 HOURS SCHEDULED
Status: DISCONTINUED | OUTPATIENT
Start: 2024-10-05 | End: 2024-10-15 | Stop reason: HOSPADM

## 2024-10-05 RX ORDER — NITROGLYCERIN 0.4 MG/1
0.4 TABLET SUBLINGUAL
Status: CANCELLED | OUTPATIENT
Start: 2024-10-05

## 2024-10-05 RX ORDER — BISACODYL 10 MG
10 SUPPOSITORY, RECTAL RECTAL DAILY PRN
Status: DISCONTINUED | OUTPATIENT
Start: 2024-10-05 | End: 2024-10-05

## 2024-10-05 RX ORDER — SODIUM CHLORIDE 9 MG/ML
75 INJECTION, SOLUTION INTRAVENOUS CONTINUOUS
Status: DISCONTINUED | OUTPATIENT
Start: 2024-10-05 | End: 2024-10-05

## 2024-10-05 RX ADMIN — FUROSEMIDE 40 MG: 10 INJECTION, SOLUTION INTRAVENOUS at 06:14

## 2024-10-05 RX ADMIN — PROPOFOL 5 MCG/KG/MIN: 10 INJECTION, EMULSION INTRAVENOUS at 05:50

## 2024-10-05 RX ADMIN — MUPIROCIN 1 APPLICATION: 20 OINTMENT TOPICAL at 21:05

## 2024-10-05 RX ADMIN — HEPARIN SODIUM 5000 UNITS: 5000 INJECTION, SOLUTION INTRAVENOUS; SUBCUTANEOUS at 08:57

## 2024-10-05 RX ADMIN — OXYCODONE HYDROCHLORIDE 10 MG: 5 TABLET ORAL at 18:07

## 2024-10-05 RX ADMIN — CEFEPIME 2000 MG: 2 INJECTION, POWDER, FOR SOLUTION INTRAVENOUS at 06:14

## 2024-10-05 RX ADMIN — ACETAMINOPHEN 650 MG: 650 SOLUTION ORAL at 22:10

## 2024-10-05 RX ADMIN — HYDROMORPHONE HYDROCHLORIDE 0.5 MG: 1 INJECTION, SOLUTION INTRAMUSCULAR; INTRAVENOUS; SUBCUTANEOUS at 21:45

## 2024-10-05 RX ADMIN — SODIUM CHLORIDE 75 ML/HR: 9 INJECTION, SOLUTION INTRAVENOUS at 06:25

## 2024-10-05 RX ADMIN — PROPOFOL 25 MCG/KG/MIN: 10 INJECTION, EMULSION INTRAVENOUS at 11:15

## 2024-10-05 RX ADMIN — ETOMIDATE 20 MG: 20 INJECTION, SOLUTION INTRAVENOUS at 05:02

## 2024-10-05 RX ADMIN — SODIUM CHLORIDE, POTASSIUM CHLORIDE, SODIUM LACTATE AND CALCIUM CHLORIDE 1857 ML: 600; 310; 30; 20 INJECTION, SOLUTION INTRAVENOUS at 02:33

## 2024-10-05 RX ADMIN — HEPARIN SODIUM 5000 UNITS: 5000 INJECTION, SOLUTION INTRAVENOUS; SUBCUTANEOUS at 21:05

## 2024-10-05 RX ADMIN — PIPERACILLIN SODIUM AND TAZOBACTAM SODIUM 3.38 G: 3; .375 INJECTION, POWDER, LYOPHILIZED, FOR SOLUTION INTRAVENOUS at 02:33

## 2024-10-05 RX ADMIN — HEPARIN SODIUM 5000 UNITS: 5000 INJECTION, SOLUTION INTRAVENOUS; SUBCUTANEOUS at 17:23

## 2024-10-05 RX ADMIN — MUPIROCIN 1 APPLICATION: 20 OINTMENT TOPICAL at 08:58

## 2024-10-05 RX ADMIN — MIDAZOLAM 1 MG/HR: 5 INJECTION INTRAMUSCULAR; INTRAVENOUS at 05:25

## 2024-10-05 RX ADMIN — Medication 10 ML: at 08:58

## 2024-10-05 RX ADMIN — Medication 10 ML: at 21:05

## 2024-10-05 RX ADMIN — ROCURONIUM BROMIDE 50 MG: 10 INJECTION, SOLUTION INTRAVENOUS at 05:02

## 2024-10-05 RX ADMIN — EPINEPHRINE 1 MG: 0.1 INJECTION INTRAVENOUS at 04:58

## 2024-10-05 RX ADMIN — CHLORHEXIDINE GLUCONATE 1 APPLICATION: 500 CLOTH TOPICAL at 08:58

## 2024-10-05 RX ADMIN — PROPOFOL 30 MCG/KG/MIN: 10 INJECTION, EMULSION INTRAVENOUS at 18:36

## 2024-10-05 RX ADMIN — PANTOPRAZOLE SODIUM 40 MG: 40 INJECTION, POWDER, FOR SOLUTION INTRAVENOUS at 10:03

## 2024-10-05 RX ADMIN — OXYCODONE HYDROCHLORIDE 10 MG: 5 TABLET ORAL at 10:03

## 2024-10-05 RX ADMIN — SODIUM BICARBONATE 50 MEQ: 84 INJECTION INTRAVENOUS at 04:59

## 2024-10-05 RX ADMIN — MAGNESIUM SULFATE HEPTAHYDRATE 2 G: 2 INJECTION, SOLUTION INTRAVENOUS at 10:03

## 2024-10-05 NOTE — ED PROVIDER NOTES
"Subjective   History of Present Illness  83 y/o  female BIBEMS secondary to family concerned over deteriorating mental status.  Began earlier in the day w/diorrhea and has steadily declined.  No other Hx provided by EMS or nursing staff and patient cannot as they are now altered.      Daughter Ivonne Liang provides good info [[phone]] - long time Rx Oxycodone from Dr Garcia for Fibromyalgia, has episodes of altered mental status cycling w/complete coherency, trouble w/word finding as of late, discusses hesitancy while urinating, w/in the last three days or so \"has made a mess during BM's\" and has had a fever throughout the day [subjective, as felt warm to touch]         Review of Systems   All other systems reviewed and are negative.      Past Medical History:   Diagnosis Date    Anxiety     Arthritis     Bronchitis     Cancer     uterine    Chronic pain     Depression     Disease of thyroid gland     Fibromyalgia     GERD (gastroesophageal reflux disease)     Headache     History of transfusion     AS     Hyperlipidemia     Hypertension     Incontinence     Insomnia     Leg pain     Lumbar stenosis     Migraines     Peptic ulcer     Restless legs     Sleep apnea     NO C-PAP    UTI (urinary tract infection)     Vaginal bleeding        Allergies   Allergen Reactions    Ropinirole Hcl Shortness Of Breath    Codeine Itching and Mental Status Change    Definity [Perflutren Lipid Microsphere] Other (See Comments)     Severe back pain    Ambien [Zolpidem] Other (See Comments)     HYPER     Eszopiclone Other (See Comments)     MAKES PT HYPER     Pregabalin Dizziness    Tizanidine Other (See Comments)     Terrible nightmares       Past Surgical History:   Procedure Laterality Date    BLADDER REPAIR      MESH HAD TO BE REMOVED IN 2013    BREAST BIOPSY Right 2017    benign    BREAST CYST EXCISION Left     CARDIAC CATHETERIZATION      CARPAL TUNNEL RELEASE      CATARACT EXTRACTION W/ INTRAOCULAR " LENS  IMPLANT, BILATERAL      CHOLECYSTECTOMY WITH INTRAOPERATIVE CHOLANGIOGRAM N/A 9/7/2023    Procedure: CHOLECYSTECTOMY LAPAROSCOPIC INTRAOPERATIVE CHOLANGIOGRAM;  Surgeon: Gerardo Arciniega MD;  Location:  [facility] OR;  Service: General;  Laterality: N/A;    COLONOSCOPY      COLONOSCOPY N/A 10/01/2021    Procedure: COLONOSCOPY WITH ANESTHESIA;  Surgeon: Tom Velasco DO;  Location:  [facility] ENDOSCOPY;  Service: Gastroenterology;  Laterality: N/A;  pre: change in bowel habits  post: diverticulosis. hemorrhoids.   Olivia Mora APRN        CYSTECTOMY      D & C HYSTEROSCOPY N/A 11/06/2017    Procedure: DILATATION AND CURETTAGE HYSTEROSCOPY;  Surgeon: Shasta Madrigal MD;  Location: [facility] OR;  Service:     DILATION AND CURETTAGE, DIAGNOSTIC / THERAPEUTIC  2008    ENDOSCOPY  09/23/2010    Short segment of Arriola's,Moderate chroninc esophagogastritis and negative H.pylori    ENDOSCOPY N/A 09/25/2017    Procedure: ESOPHAGOGASTRODUODENOSCOPY WITH ANESTHESIA;  Surgeon: Tom Velasco DO;  Location: [facility] ENDOSCOPY;  Service:     EYE SURGERY      RETINA    HEMORRHOIDECTOMY SIGMOIDOSCOPY N/A 3/21/2023    Procedure: HEMORRHOIDECTOMY WITH EXAM UNDER ANESTHESIA;  Surgeon: Holly Chavez MD;  Location: [facility] OR;  Service: General;  Laterality: N/A;    HYSTERECTOMY  12/20/2017    ORIF TIBIA/FIBULA FRACTURES Left 2000    TRANSVAGINAL TAPING SUSPENSION N/A 11/06/2017    Procedure: VAGINAL MESH REVISION;  Surgeon: Shasta Madrigal MD;  Location:  [facility] OR;  Service:     VAGINAL MESH REVISION  2013       Family History   Problem Relation Age of Onset    Diabetes Mother     Multiple myeloma Mother     Stroke Father     Diabetes Sister     Prostate cancer Brother     Lymphoma Brother         NHL    Ovarian cancer Paternal Aunt     Cancer Paternal Grandmother         metastatic    Lung cancer Paternal Grandfather     Colon cancer Neg Hx     Esophageal cancer Neg Hx     Breast cancer Neg Hx        Social History      Socioeconomic History    Marital status:    Tobacco Use    Smoking status: Never    Smokeless tobacco: Never   Vaping Use    Vaping status: Never Used   Substance and Sexual Activity    Alcohol use: Not Currently     Comment: occasional    Drug use: No    Sexual activity: Defer           Objective   Physical Exam  Constitutional:       General: She is in acute distress.      Appearance: She is ill-appearing.      Comments: Unkempt; malodorous [fecal]    HENT:      Head: Normocephalic and atraumatic.   Eyes:      Extraocular Movements: Extraocular movements intact.      Pupils: Pupils are equal, round, and reactive to light.   Cardiovascular:      Rate and Rhythm: Regular rhythm. Tachycardia present.   Pulmonary:      Effort: Pulmonary effort is normal.      Breath sounds: Normal breath sounds.   Abdominal:      Comments: + hyperactive bowel sounds; + generalized tenderness to palpation    Musculoskeletal:         General: Swelling present.      Cervical back: Normal range of motion and neck supple.      Comments: 2+ bilateral L/E oedema    Skin:     General: Skin is warm and dry.   Neurological:      Comments: Awake yet minimally responsive except to painful stimuli    Psychiatric:         Behavior: Behavior is agitated.         Procedures           ED Course                                             Medical Decision Making  Will admit for sepsis, uti, gastroenteritis, metabolic encephalopthy     Shortly after returning from CAT scan the patient took a turn for the worse, decompensated to the point of becoming asystolic.  A brief course of ACLS was had including 1 ampoule of epinephrine, 1 ampoule of bicarbonate, at which point ROSC was obtained.  The patient maintained a gag reflex at that point does not utilize 20 mg of vitamin D2, 50 mg of rocuronium and intubated on the second attempt.  Good color change, equal chest rise, absent breath sounds over the epigastrium, chest x-ray reveals ET tube to be  in location.  Had already been accepted to ICU after conference between Dr.'s Morgan and MICAELA Gibbs.  Will go to the unit certainly after this.    Problems Addressed:  Acute cystitis without hematuria: complicated acute illness or injury  Acute metabolic encephalopathy: complicated acute illness or injury  Gastroenteritis: complicated acute illness or injury  Sepsis without acute organ dysfunction, due to unspecified organism: complicated acute illness or injury    Amount and/or Complexity of Data Reviewed  Labs: ordered.  Radiology: ordered.  ECG/medicine tests: ordered.    Risk  Prescription drug management.  Decision regarding hospitalization.        Final diagnoses:   None       ED Disposition  ED Disposition       ED Disposition   Intended Admit    Condition   --    Comment   --               No follow-up provider specified.       Medication List      No changes were made to your prescriptions during this visit.            aT Castillo MD  10/05/24 0355       Ta Castillo MD  10/05/24 0574

## 2024-10-05 NOTE — PLAN OF CARE
Goal Outcome Evaluation:  Plan of Care Reviewed With: caregiver        Progress: no change  Outcome Evaluation: RDN consult for tube feeding. Pt intubated today.Initiate Peptamen AF at 20 ml/hr x 22 hrs.,increase by 10 ml/hr every 8 hrs as tolerated until a goal rate of 45 ml/hr. Routine water flush of 30 ml/hr. Cont to follow for plan of care.

## 2024-10-05 NOTE — ED NOTES
Pt was in process when transported to CT for CT scan of the abdomen and head. Pt was alert and confused (baseline upon arrival to the MD) during transport to CT scan. Pt was placed on the table for scan while still alert and CT of the head was performed while this RN was assisting with holding the pts arms still during scan of the head, as she was restless during examination. This RN then stepped behind the CT table for the CT of the abdomen while the pt was still at their baseline. After CT of the abdomen was performed, pt declined in activity/restlessness. Pt was agonal breathing and nonresponsive, so this RN checked for a pulse. +2 pulse was present so this RN and techs returned the pt to the bed and quickly transported the pt to bed 1. While the pt was outside of the room, this RN checked for a pulse again and bradycardic +2 pulse was present. Pt was brought into room one, where pt was agonal breathing and it was determined that no pulse was present. Code initiated at 3530.

## 2024-10-05 NOTE — Clinical Note
Level of Care: Telemetry [5]   Diagnosis: Sepsis [[id number]]   Admitting Physician: BATSHEVA MATIAS [[id number]]   Attending Physician: BATSHEVA MATIAS [[id number]]   Certification: I Certify That Inpatient Hospital Services Are Medically Necessary For Greater Than 2 Midnights

## 2024-10-05 NOTE — PROGRESS NOTES
I came to evaluate patient for admission, with diagnosis of sepsis, likely urinary tract infection as a source; found patient disoriented, not answering my questions, not following commands, restless.  I recommended CT scan of the abdomen and pelvis without contrast given prior imaging showing moderate right hydronephrosis.  I asked Emergency Department physician to contact the intensive care unit for possible admission to this setting as I anticipate patient may deteriorate on the medical floor.

## 2024-10-05 NOTE — H&P
[facility] Intensivist Services    Date of Admission: 10/5/2024  Date of Note: 10/05/24  Primary Care Physician: Provider, No Known    History   Mrs. Gomes is an 84-year-old female with past medical history of hypertension, GERD, chronic kidney disease stage III, and chronic pain who presented to [facility] emergency department early this morning via EMS for altered mental status with weakness. My HPI comes from ER provider, Dr. Castillo, and daughter (Ivonne) who is her medical decision maker and care provider who explains that Mrs. Gomes has had loose stool for the last week causing her to be incontinent at times.  Daughter explains that the stool is loose and brown without evidence of blood, and is foul smelling. She reports urinary incontinence that is also foul-smelling and blood-tinged.  She explains that early this morning the patient was up to the bedside commode and became very disoriented and weak therefore they called the ambulance to bring her to the hospital for further evaluation. Ivonne advises that Mrs. Gomes is a DNR/DNI    ED course workup is significant for pt with altered mental status and vital signs of /55, heart rate 113, respirations 30, 94% on 2 L per nasal cannula, and temperature 103.7.   Acute on chronic renal failure with BUN 47 and creatinine 2.18  WBC 22K  H&H 7.4/22.8  CXR: reviewed by me showing chronic right elevated hemidiaphragm moderate pulmonary edema     Critical care team was asked to evaluate patient for admission to the unit for further management of acute medical needs.     I have seen and evaluated Mrs. Gomes while she is in the emergency department and find the patient confused, moaning, appears uncomfortable, does not respond to questioning. She is tachypneic with audible wheezes.  I have asked ER provider to have respiratory administer nebulizer treatment and have accepted patient for admission.   Past Medical History      Active and Resolved Problems  Active Hospital Problems    Diagnosis  POA    **Sepsis [A41.9]  Yes      Resolved Hospital Problems   No resolved problems to display.       Past Medical History:   Past Medical History:   Diagnosis Date    Anxiety     Arthritis     Bronchitis     Cancer     uterine    Chronic pain     Depression     Disease of thyroid gland     Fibromyalgia     GERD (gastroesophageal reflux disease)     Headache     History of transfusion     AS     Hyperlipidemia     Hypertension     Incontinence     Insomnia     Leg pain     Lumbar stenosis     Migraines     Peptic ulcer     Restless legs     Sleep apnea     NO C-PAP    UTI (urinary tract infection)     Vaginal bleeding        Prior Surgeries: She  has a past surgical history that includes Cystectomy; Esophagogastroduodenoscopy (2010); Dilation and curettage, diagnostic / therapeutic (); Bladder repair (); ORIF tibia & fibula fractures (Left, ); Vaginal Mesh Revision (); Cataract extraction w/ intraocular lens  implant, bilateral; Carpal tunnel release; Esophagogastroduodenoscopy (N/A, 2017); transvaginal taping suspension (N/A, 2017); d & c hysteroscopy (N/A, 2017); Hysterectomy (2017); Breast cyst excision (Left, ); Cardiac catheterization; Breast biopsy (Right, ); Colonoscopy; Colonoscopy (N/A, 10/01/2021); Eye surgery; hemorrhoidectomy sigmoidoscopy (N/A, 3/21/2023); and cholecystectomy with intraoperative cholangiogram (N/A, 2023).    Past Surgical History:   Past Surgical History:   Procedure Laterality Date    BLADDER REPAIR      MESH HAD TO BE REMOVED IN     BREAST BIOPSY Right     benign    BREAST CYST EXCISION Left     CARDIAC CATHETERIZATION      CARPAL TUNNEL RELEASE      CATARACT EXTRACTION W/ INTRAOCULAR LENS  IMPLANT, BILATERAL      CHOLECYSTECTOMY WITH INTRAOPERATIVE CHOLANGIOGRAM N/A 2023    Procedure: CHOLECYSTECTOMY LAPAROSCOPIC INTRAOPERATIVE  CHOLANGIOGRAM;  Surgeon: Gerardo Arciniega MD;  Location: [facility] OR;  Service: General;  Laterality: N/A;    COLONOSCOPY      COLONOSCOPY N/A 10/01/2021    Procedure: COLONOSCOPY WITH ANESTHESIA;  Surgeon: Tom Velasco DO;  Location: [facility] ENDOSCOPY;  Service: Gastroenterology;  Laterality: N/A;  pre: change in bowel habits  post: diverticulosis. hemorrhoids.   Olivia Mora, APRN        CYSTECTOMY      D & C HYSTEROSCOPY N/A 11/06/2017    Procedure: DILATATION AND CURETTAGE HYSTEROSCOPY;  Surgeon: Shasta Madrigal MD;  Location: [facility] OR;  Service:     DILATION AND CURETTAGE, DIAGNOSTIC / THERAPEUTIC  2008    ENDOSCOPY  09/23/2010    Short segment of Arriola's,Moderate chroninc esophagogastritis and negative H.pylori    ENDOSCOPY N/A 09/25/2017    Procedure: ESOPHAGOGASTRODUODENOSCOPY WITH ANESTHESIA;  Surgeon: Tom Velasco DO;  Location: [facility] ENDOSCOPY;  Service:     EYE SURGERY      RETINA    HEMORRHOIDECTOMY SIGMOIDOSCOPY N/A 3/21/2023    Procedure: HEMORRHOIDECTOMY WITH EXAM UNDER ANESTHESIA;  Surgeon: Holly Chavez MD;  Location: [facility] OR;  Service: General;  Laterality: N/A;    HYSTERECTOMY  12/20/2017    ORIF TIBIA/FIBULA FRACTURES Left 2000    TRANSVAGINAL TAPING SUSPENSION N/A 11/06/2017    Procedure: VAGINAL MESH REVISION;  Surgeon: Shasta Madrigal MD;  Location: [facility] OR;  Service:     VAGINAL MESH REVISION  2013       Social and Family History     Family History:  family history includes Cancer in her paternal grandmother; Diabetes in her mother and sister; Lung cancer in her paternal grandfather; Lymphoma in her brother; Multiple myeloma in her mother; Ovarian cancer in her paternal aunt; Prostate cancer in her brother; Stroke in her father.    Tobacco/Social History:  reports that she has never smoked. She has never used smokeless tobacco. She reports that she does not currently use alcohol. She reports that she does not use drugs.    Allergies     Allergies:   She is  allergic to ropinirole hcl, codeine, definity [perflutren lipid microsphere], ambien [zolpidem], eszopiclone, pregabalin, and tizanidine.    Allergies   Allergen Reactions    Ropinirole Hcl Shortness Of Breath    Codeine Itching and Mental Status Change    Definity [Perflutren Lipid Microsphere] Other (See Comments)     Severe back pain    Ambien [Zolpidem] Other (See Comments)     HYPER     Eszopiclone Other (See Comments)     MAKES PT HYPER     Pregabalin Dizziness    Tizanidine Other (See Comments)     Terrible nightmares       Labs     Basic Labs:  Common labs          8/7/2024    11:38 9/11/2024    13:40 10/5/2024    02:01   Common Labs   Glucose 98     99     165    BUN 27     37     47    Creatinine 1.6     1.7     2.18    Sodium 139     140     133    Potassium 4.3     5.1     4.5    Chloride 103     105     97    Calcium 9.1     9.2     9.0    Albumin 3.4     3.5     3.1    Total Bilirubin 0.2     0.2     0.5    Alkaline Phosphatase 78     77     70    AST (SGOT) 13     12     19    ALT (SGPT) 10     5     12    WBC 6.2     5.9     22.64    Hemoglobin 8.9     9.3     7.4    Hematocrit 29.3     30.0     22.8    Platelets 182     131     213       Details          This result is from an external source.               Diabetic:      Inpatient Medications     Medications: Scheduled Meds:cefepime, 2,000 mg, Intravenous, Once  cefepime, 2,000 mg, Intravenous, Q24H  heparin (porcine), 5,000 Units, Subcutaneous, Q12H  sodium chloride, 10 mL, Intravenous, Q12H      Continuous Infusions:sodium chloride, 75 mL/hr      PRN Meds:.  acetaminophen **OR** acetaminophen **OR** acetaminophen    senna-docusate sodium **AND** [DISCONTINUED] polyethylene glycol **AND** bisacodyl **AND** bisacodyl    Calcium Replacement - Follow Nurse / BPA Driven Protocol    Magnesium Low Dose Replacement - Follow Nurse / BPA Driven Protocol    ondansetron ODT **OR** ondansetron    Phosphorus Replacement - Follow Nurse / BPA Driven Protocol     "Potassium Replacement - Follow Nurse / BPA Driven Protocol    sodium chloride    sodium chloride    I have reviewed the patient's current medications.   Outpatient Medications     Outpatient Medications:   No outpatient medications have been marked as taking for the 10/5/24 encounter (Hospital Encounter).       Current Antibiotics     cefepime 2000 mg IVPB in 100 mL NS (MBP)    Exam     Vitals: Her  height is 157.5 cm (62\") and weight is 79.6 kg (175 lb 6.4 oz). Her rectal temperature is 103.7 °F (39.8 °C) (abnormal). Her blood pressure is 160/55 and her pulse is 113. Her respiration is 30 (abnormal) and oxygen saturation is 94%.     GENERAL:  Alert, confused, moaning, does not respond to questioning   SKIN:  Warm, moist   EYES:  Pupils equal, round and reactive to light.  EOMs intact.    HEAD:  Normocephalic.  NECK:  Supple   RESP: With audible wheezes  CARDIAC: Sinus tachycardia. Normal S1,S2. No edema  GI:  Soft, moans to palpation throughout abdomen, normal bowel sounds  MSK:  Normal muscle bulk, tone  NEUROLOGICAL: Altered mental status. moves all extremities but does not follow commands.  PSYCHIATRIC: uncomfortable    Results and Cultures Review     Result Review:  I have personally reviewed the results from the time of this admission to 10/5/2024 04:37 CDT and agree with these findings:  [x]  Laboratory list / accordion  [x]  Microbiology  [x]  Radiology  []  EKG/Telemetry   []  Cardiology/Vascular   []  Pathology  [x]  Old records  []  Other:  Most notable findings include: per HPI      Culture Data:   No results found for: \"BLOODCX\", \"URINECX\", \"WOUNDCX\", \"MRSACX\", \"RESPCX\", \"STOOLCX\"    Assessment/Plan   *Acute renal failure  *Urinary tract infection  *Sepsis  *Altered mental status    This is an 84-year-old female who presented to [facility] emergency department via EMS for altered mental status and weakness.  The patient lives at home with her  and daughter who helps provide care.  The " patient's daughter, Lola, explains that the patient is normally very high functioning and uses a walker to ambulate, however became very weak early this morning and was then unable to get off the commode and had altered mental status.  Daughter reported 1 week history of loose foul-smelling bowel movements and urinary incontinence that is also very foul-smelling.  ED course workup revealed patient to have urinary tract infection with acute on chronic renal failure and is meeting sepsis criteria.  Care team was asked to evaluate patient for admission    Patient will be admitted to the acute care unit under intensivist MD, Dr. Trujillo, and the case will be discussed with her this morning.  Recommendations and/or modifications to the treatment plan are ultimately at her discretion.      Treatment plan    *Sepsis  -Likely urinary source  -Urinalysis turbid with moderate blood and 3+ bacteria and 3+ leukocytes  -adjusted sepsis fluid LR bolus provided in ED 1,857 ml  -Antibiotic coverage with cefepime  -CT abdomen pelvis without pending  -Urine culture pending  -Blood cultures pending    *Acute renal failure  -stage III kidney disease baseline Cr. appears to be around 1.5 todays 2.18  -sepsis uti likely contributing   -CT abd/pel w/o pending to evaluate for possible obstructive component   -IV hydration monitor output and lytes   -daily bmp    *AMS  -likely related to urinary tract infection  -Ct head pending      VTE Prophylaxis:    Pharmacologic & mechanical VTE prophylaxis orders are present.        Total critical care time: Approximately 45 minutes    Due to a high probability of clinically significant, life threatening deterioration, the patient required my highest level of preparedness to intervene emergently and I personally spent this critical care time directly and personally managing the patient.     This critical care time included obtaining a history; examining the patient; pulse oximetry; ordering and review  of studies; arranging urgent treatment with development of a management plan; evaluation of patient's response to treatment; frequent reassessment; and, discussions with other providers.    This critical care time was performed to assess and manage the high probability of imminent, life-threatening deterioration that could result in multi-organ failure. It was exclusive of separately billable procedures and treating other patients and teaching time.    Please see MDM section and the rest of the note for further information on patient assessment and treatment.    Part of this note may be an electronic transcription/translation of spoken language to printed text using the Dragon Dictation System.    Electronically signed by MORGAN Pulido on 10/5/2024 at 04:37 CDT

## 2024-10-05 NOTE — PLAN OF CARE
Goal Outcome Evaluation:      TTM Dcd per MD, Pt warmed slowly to 98.6 F  Pt has some purposeful movements.  IVF increased to 125 mL/hr  Pt restarted on home pain medication, remains on diprivan at this time.   OG removed, NG placed and verified. Tube feed to be initiated per dietary orders.  Safety maintained, turned q2.                           Problem: Skin Injury Risk Increased  Goal: Skin Health and Integrity  Outcome: Progressing  Intervention: Optimize Skin Protection  Recent Flowsheet Documentation  Taken 10/5/2024 1800 by Crow Ansari RN  Head of Bed ([facility]) Positioning: HOB at 30-45 degrees  Taken 10/5/2024 1700 by Crow Ansari RN  Head of Bed ([facility]) Positioning: HOB at 30-45 degrees  Taken 10/5/2024 1600 by Crow Ansari RN  Pressure Reduction Techniques: weight shift assistance provided  Head of Bed ([facility]) Positioning: HOB at 30-45 degrees  Pressure Reduction Devices: pressure-redistributing mattress utilized  Skin Protection:   adhesive use limited   incontinence pads utilized   silicone foam dressing in place   tubing/devices free from skin contact  Taken 10/5/2024 1500 by Crow Ansari RN  Head of Bed ([facility]) Positioning: HOB at 30-45 degrees  Taken 10/5/2024 1400 by Crow Ansari RN  Head of Bed ([facility]) Positioning: HOB at 30-45 degrees  Taken 10/5/2024 1300 by Crow Ansari RN  Head of Bed ([facility]) Positioning: HOB at 30-45 degrees  Taken 10/5/2024 1200 by Crow Ansari RN  Pressure Reduction Techniques:   weight shift assistance provided   pressure points protected  Head of Bed ([facility]) Positioning: HOB at 30-45 degrees  Pressure Reduction Devices: pressure-redistributing mattress utilized  Skin Protection:   silicone foam dressing in place   adhesive use limited   incontinence pads utilized   tubing/devices free from skin contact  Taken 10/5/2024 1100 by Crow Ansari RN  Head of Bed ([facility]) Positioning: HOB at 30-45 degrees  Taken 10/5/2024 1000 by Crow Ansari RN  Head of  Bed ([facility] [place] [facility]) Positioning: HOB at 30-45 degrees  Taken 10/5/2024 0900 by Crow Ansari RN  Head of Bed ([facility] [place]) Positioning: HOB at 30-45 degrees  Taken 10/5/2024 0800 by Crow Ansari, ELBA  Pressure Reduction Techniques:   weight shift assistance provided   pressure points protected   heels elevated off bed  Head of Bed ([facility] [place] [facility]) Positioning: HOB at 30-45 degrees  Pressure Reduction Devices: pressure-redistributing mattress utilized  Skin Protection:   silicone foam dressing in place   adhesive use limited   tubing/devices free from skin contact  Taken 10/5/2024 0700 by Crow Ansari, RN  Head of Bed ([facility]) Positioning: HOB at 30-45 degrees

## 2024-10-05 NOTE — PROGRESS NOTES
RT EQUIPMENT DEVICE RELATED - SKIN ASSESSMENT    Maikol Score:  Maikol Score: 14     RT Medical Equipment/Device:     ETT Baugh/Anchorfast    Skin Assessment:      Cheek:  Intact    Device Skin Pressure Protection:  Skin-to-device areas padded:  Anchorfast    Nurse Notification:  Sandy Matias, CRT

## 2024-10-05 NOTE — PLAN OF CARE
Goal Outcome Evaluation:      Pt arrived to CCU 10 @ 0545 with temp 102.7. Arctic sun in place, machine was not working properly, refilled the fluid and cooling started @ 0600. Rewarming started @ 0657 per MD at bedside. Pt remains intubated, on propofol @ 45, IVF @ 75. Pt safety maintained.

## 2024-10-06 LAB
ABO GROUP BLD: NORMAL
ALBUMIN SERPL-MCNC: 2.3 G/DL (ref 3.5–5.2)
ANION GAP SERPL CALCULATED.3IONS-SCNC: 11 MMOL/L (ref 5–15)
ARTERIAL PATENCY WRIST A: POSITIVE
ATMOSPHERIC PRESS: 753 MMHG
BASE EXCESS BLDA CALC-SCNC: -2 MMOL/L (ref 0–2)
BDY SITE: ABNORMAL
BLD GP AB SCN SERPL QL: NEGATIVE
BODY TEMPERATURE: 37
BUN SERPL-MCNC: 44 MG/DL (ref 8–23)
BUN/CREAT SERPL: 18.3 (ref 7–25)
CALCIUM SPEC-SCNC: 8 MG/DL (ref 8.6–10.5)
CHLORIDE SERPL-SCNC: 101 MMOL/L (ref 98–107)
CO2 SERPL-SCNC: 22 MMOL/L (ref 22–29)
CREAT SERPL-MCNC: 2.4 MG/DL (ref 0.57–1)
DEPRECATED RDW RBC AUTO: 46.7 FL (ref 37–54)
EGFRCR SERPLBLD CKD-EPI 2021: 19.7 ML/MIN/1.73
ERYTHROCYTE [DISTWIDTH] IN BLOOD BY AUTOMATED COUNT: 14.4 % (ref 12.3–15.4)
GLUCOSE BLDC GLUCOMTR-MCNC: 126 MG/DL (ref 70–130)
GLUCOSE BLDC GLUCOMTR-MCNC: 149 MG/DL (ref 70–130)
GLUCOSE SERPL-MCNC: 119 MG/DL (ref 65–99)
HCO3 BLDA-SCNC: 23.5 MMOL/L (ref 20–26)
HCT VFR BLD AUTO: 19.2 % (ref 34–46.6)
HCT VFR BLD AUTO: 24.6 % (ref 34–46.6)
HGB BLD-MCNC: 6.3 G/DL (ref 12–15.9)
HGB BLD-MCNC: 7.8 G/DL (ref 12–15.9)
INHALED O2 CONCENTRATION: 30 %
Lab: ABNORMAL
MAGNESIUM SERPL-MCNC: 1.6 MG/DL (ref 1.6–2.4)
MCH RBC QN AUTO: 29.6 PG (ref 26.6–33)
MCHC RBC AUTO-ENTMCNC: 32.8 G/DL (ref 31.5–35.7)
MCV RBC AUTO: 90.1 FL (ref 79–97)
MODALITY: ABNORMAL
PCO2 BLDA: 42.5 MM HG (ref 35–45)
PCO2 TEMP ADJ BLD: 42.5 MM HG (ref 35–45)
PEEP RESPIRATORY: 5 CM[H2O]
PH BLDA: 7.35 PH UNITS (ref 7.35–7.45)
PH, TEMP CORRECTED: 7.35 PH UNITS (ref 7.35–7.45)
PHOSPHATE SERPL-MCNC: 3.4 MG/DL (ref 2.5–4.5)
PLATELET # BLD AUTO: 140 10*3/MM3 (ref 140–450)
PMV BLD AUTO: 11.6 FL (ref 6–12)
PO2 BLD: 269 MM[HG] (ref 0–500)
PO2 BLDA: 80.7 MM HG (ref 83–108)
PO2 TEMP ADJ BLD: 80.7 MM HG (ref 83–108)
POTASSIUM SERPL-SCNC: 3.4 MMOL/L (ref 3.5–5.2)
QT INTERVAL: 294 MS
QT INTERVAL: 334 MS
QTC INTERVAL: 432 MS
QTC INTERVAL: 437 MS
RBC # BLD AUTO: 2.13 10*6/MM3 (ref 3.77–5.28)
RH BLD: POSITIVE
SAO2 % BLDCOA: 96.8 % (ref 94–99)
SET MECH RESP RATE: 14
SODIUM SERPL-SCNC: 134 MMOL/L (ref 136–145)
T&S EXPIRATION DATE: NORMAL
VENTILATOR MODE: AC
VT ON VENT VENT: 400 ML
WBC NRBC COR # BLD AUTO: 16.64 10*3/MM3 (ref 3.4–10.8)

## 2024-10-06 PROCEDURE — 25010000002 CEFEPIME PER 500 MG: Performed by: FAMILY MEDICINE

## 2024-10-06 PROCEDURE — 25010000002 CEFEPIME PER 500 MG: Performed by: NURSE PRACTITIONER

## 2024-10-06 PROCEDURE — 83735 ASSAY OF MAGNESIUM: CPT | Performed by: NURSE PRACTITIONER

## 2024-10-06 PROCEDURE — 86923 COMPATIBILITY TEST ELECTRIC: CPT

## 2024-10-06 PROCEDURE — 94799 UNLISTED PULMONARY SVC/PX: CPT

## 2024-10-06 PROCEDURE — 25810000003 SODIUM CHLORIDE 0.9 % SOLUTION: Performed by: INTERNAL MEDICINE

## 2024-10-06 PROCEDURE — 36600 WITHDRAWAL OF ARTERIAL BLOOD: CPT

## 2024-10-06 PROCEDURE — 94003 VENT MGMT INPAT SUBQ DAY: CPT

## 2024-10-06 PROCEDURE — 94761 N-INVAS EAR/PLS OXIMETRY MLT: CPT

## 2024-10-06 PROCEDURE — 80069 RENAL FUNCTION PANEL: CPT | Performed by: INTERNAL MEDICINE

## 2024-10-06 PROCEDURE — 82803 BLOOD GASES ANY COMBINATION: CPT

## 2024-10-06 PROCEDURE — 86901 BLOOD TYPING SEROLOGIC RH(D): CPT | Performed by: NURSE PRACTITIONER

## 2024-10-06 PROCEDURE — 85018 HEMOGLOBIN: CPT | Performed by: INTERNAL MEDICINE

## 2024-10-06 PROCEDURE — 25010000002 PROPOFOL 10 MG/ML EMULSION: Performed by: INTERNAL MEDICINE

## 2024-10-06 PROCEDURE — 86900 BLOOD TYPING SEROLOGIC ABO: CPT

## 2024-10-06 PROCEDURE — 86900 BLOOD TYPING SEROLOGIC ABO: CPT | Performed by: NURSE PRACTITIONER

## 2024-10-06 PROCEDURE — 86850 RBC ANTIBODY SCREEN: CPT | Performed by: NURSE PRACTITIONER

## 2024-10-06 PROCEDURE — 92610 EVALUATE SWALLOWING FUNCTION: CPT

## 2024-10-06 PROCEDURE — 85014 HEMATOCRIT: CPT | Performed by: INTERNAL MEDICINE

## 2024-10-06 PROCEDURE — 82948 REAGENT STRIP/BLOOD GLUCOSE: CPT

## 2024-10-06 PROCEDURE — 85027 COMPLETE CBC AUTOMATED: CPT | Performed by: INTERNAL MEDICINE

## 2024-10-06 PROCEDURE — 25010000002 HYDROMORPHONE PER 4 MG: Performed by: INTERNAL MEDICINE

## 2024-10-06 PROCEDURE — 36430 TRANSFUSION BLD/BLD COMPNT: CPT

## 2024-10-06 PROCEDURE — P9016 RBC LEUKOCYTES REDUCED: HCPCS

## 2024-10-06 RX ORDER — POTASSIUM CHLORIDE 1.5 G/1.58G
40 POWDER, FOR SOLUTION ORAL EVERY 4 HOURS
Status: DISCONTINUED | OUTPATIENT
Start: 2024-10-06 | End: 2024-10-06

## 2024-10-06 RX ORDER — POTASSIUM CHLORIDE 1.5 G/1.58G
20 POWDER, FOR SOLUTION ORAL ONCE
Status: DISCONTINUED | OUTPATIENT
Start: 2024-10-06 | End: 2024-10-06

## 2024-10-06 RX ADMIN — MUPIROCIN 1 APPLICATION: 20 OINTMENT TOPICAL at 21:00

## 2024-10-06 RX ADMIN — POTASSIUM CHLORIDE 40 MEQ: 1.5 POWDER, FOR SOLUTION ORAL at 08:14

## 2024-10-06 RX ADMIN — OXYCODONE HYDROCHLORIDE AND ACETAMINOPHEN 1 TABLET: 5; 325 TABLET ORAL at 23:52

## 2024-10-06 RX ADMIN — CEFEPIME 2000 MG: 2 INJECTION, POWDER, FOR SOLUTION INTRAVENOUS at 10:10

## 2024-10-06 RX ADMIN — SODIUM CHLORIDE 125 ML/HR: 9 INJECTION, SOLUTION INTRAVENOUS at 02:06

## 2024-10-06 RX ADMIN — MUPIROCIN 1 APPLICATION: 20 OINTMENT TOPICAL at 08:47

## 2024-10-06 RX ADMIN — CHLORHEXIDINE GLUCONATE 1 APPLICATION: 500 CLOTH TOPICAL at 04:45

## 2024-10-06 RX ADMIN — OXYCODONE HYDROCHLORIDE 10 MG: 5 TABLET ORAL at 10:10

## 2024-10-06 RX ADMIN — HYDROMORPHONE HYDROCHLORIDE 0.5 MG: 1 INJECTION, SOLUTION INTRAMUSCULAR; INTRAVENOUS; SUBCUTANEOUS at 04:47

## 2024-10-06 RX ADMIN — PROPOFOL 40 MCG/KG/MIN: 10 INJECTION, EMULSION INTRAVENOUS at 06:33

## 2024-10-06 RX ADMIN — SODIUM CHLORIDE 75 ML/HR: 9 INJECTION, SOLUTION INTRAVENOUS at 10:57

## 2024-10-06 RX ADMIN — PANTOPRAZOLE SODIUM 40 MG: 40 INJECTION, POWDER, FOR SOLUTION INTRAVENOUS at 05:34

## 2024-10-06 RX ADMIN — OXYCODONE HYDROCHLORIDE 10 MG: 5 TABLET ORAL at 19:35

## 2024-10-06 RX ADMIN — Medication 10 ML: at 21:00

## 2024-10-06 RX ADMIN — ACETAMINOPHEN 650 MG: 650 SOLUTION ORAL at 04:45

## 2024-10-06 RX ADMIN — OXYCODONE HYDROCHLORIDE AND ACETAMINOPHEN 1 TABLET: 5; 325 TABLET ORAL at 16:52

## 2024-10-06 RX ADMIN — OXYCODONE HYDROCHLORIDE 10 MG: 5 TABLET ORAL at 01:24

## 2024-10-06 RX ADMIN — CARVEDILOL 12.5 MG: 6.25 TABLET, FILM COATED ORAL at 23:52

## 2024-10-06 RX ADMIN — PROPOFOL 30 MCG/KG/MIN: 10 INJECTION, EMULSION INTRAVENOUS at 01:24

## 2024-10-06 RX ADMIN — ACETAMINOPHEN 650 MG: 650 SOLUTION ORAL at 12:32

## 2024-10-06 RX ADMIN — HYDROMORPHONE HYDROCHLORIDE 0.5 MG: 1 INJECTION, SOLUTION INTRAMUSCULAR; INTRAVENOUS; SUBCUTANEOUS at 22:06

## 2024-10-06 NOTE — SIGNIFICANT NOTE
10/06/24 0728   Readings   PEEP Intrinsic (cm H2O) (S)  4.6 cm H2O   Plateau Pressure (cm H2O) (S)  13 cm H2O   Driving Pressure (cm H2O) 8.1 cm H2O   Static Compliance (L/cm H2O) (S)  48

## 2024-10-06 NOTE — THERAPY EVALUATION
"Acute Care - Speech Language Pathology   Swallow Initial Evaluation Psychiatric     Patient Name: Jennifer Gomes  : 1940  MRN: [id number]  Today's Date: 10/6/2024               Admit Date: 10/5/2024    SPEECH-LANGUAGE PATHOLOGY EVALUATION - SWALLOW  Subjective: The patient was seen on this date for a Clinical Swallow evaluation.  Patient was alert and cooperative. Pt reported pain stating \"it hurts to move\" when asked for specific location she stated \"everywhere\". RN Pretty notified.   Significant history: Pt presented to ED d/t AMS. Hx of HTN, GERD, CKD stage III, chronic pain. Dx of sepsis d/t UTI. ST consulted after failed post extubation dysphagia screen.     Objective: Textures given included thin liquid, puree consistency, and regular consistency.  Assessment: Difficulties were noted with regular consistency.  Observations: Oral mech WFL. Pt demonstrated facial grimace and delayed initiation of swallow when presented puree trials. She first stated that it was difficult to swallow d/t pain but later reported that she felt her swallow improved each time. Thin liquid trials completed with no overt s/s of aspiration. Vocal quality remained clear. Poor rotary chew of regular solids and increased reports of pain when swallowing regular solids.     SLP Findings:  Patient presents with mild oral dysphagia, without esophageal component.   Recommendations: Diet Textures: thin liquid,  soft to chew solids with chopped meat.  Medications should be taken whole with puree as tolerated. May have water and ice between meals after oral care, under staff or family supervision and with the recommended strategies for safe swallowing.   Recommended Strategies: Upright for PO, small bites and sips, may use straw, and alternate liquids and solids. Oral care before breakfast, after all meals and PRN.  Other Recommended Evaluations: Re-evaluation at bedside    Dysphagia therapy is recommended.     Wendy Sosa MS CCC-SLP " 10/6/2024 15:49 CDT          Visit Dx:     ICD-10-CM ICD-9-CM   1. Sepsis without acute organ dysfunction, due to unspecified organism  A41.9 038.9     995.91   2. Acute cystitis without hematuria  N30.00 595.0   3. Gastroenteritis  K52.9 558.9   4. Acute metabolic encephalopathy  G93.41 348.31   5. Acute respiratory failure with hypoxia  J96.01 518.81   6. Dysphagia, unspecified type [R13.10]  R13.10 787.20     Patient Active Problem List   Diagnosis    Gastroesophageal reflux disease    Spinal stenosis, lumbar region, without neurogenic claudication    Overweight    Non-smoker    Erosion of vaginal mesh    Adenocarcinoma of endometrium    Encounter for consultation    S/P hysterectomy with oophorectomy    Encounter for follow-up surveillance of endometrial cancer    History of radiation therapy    Obesity (BMI 30-39.9)    Non-traumatic rhabdomyolysis    Metabolic encephalopathy    Acute renal failure superimposed on stage 3 chronic kidney disease    Acute respiratory failure with hypoxia and hypercapnia    Medical non-compliance, does not take narcotics as prescribed    SIRS (systemic inflammatory response syndrome)    Chronic constipation    Chronic pain syndrome    Chronic prescription opiate use    Anemia    Hypothyroidism (acquired)    Essential hypertension    TOMÁS (acute kidney injury)    Venous insufficiency of both lower extremities    Fluid retention    Low blood pressure reading    Obesity, Class III, BMI 40-49.9 (morbid obesity)    Chronic intractable headache    RAFAL (obstructive sleep apnea)    Change in bowel habits    Acute encephalopathy due to UTI    Toxic metabolic encephalopathy    Polypharmacy    Frequent falls    Grade III internal hemorrhoids    External hemorrhoids    E. coli UTI    Colitis    Moderate malnutrition    Transaminitis    Acute cholecystitis    Acute UTI (urinary tract infection)    Polypharmacy    Altered mental status    UTI (urinary tract infection)    Bacteremia due to  Enterococcus    Dementia    Hypokalemia    Sepsis    Sepsis due to urinary tract infection     Past Medical History:   Diagnosis Date    Anxiety     Arthritis     Bronchitis     Cancer     uterine    Chronic pain     Depression     Disease of thyroid gland     Fibromyalgia     GERD (gastroesophageal reflux disease)     Headache     History of transfusion     AS     Hyperlipidemia     Hypertension     Incontinence     Insomnia     Leg pain     Lumbar stenosis     Migraines     Peptic ulcer     Restless legs     Sleep apnea     NO C-PAP    UTI (urinary tract infection)     Vaginal bleeding      Past Surgical History:   Procedure Laterality Date    BLADDER REPAIR      MESH HAD TO BE REMOVED IN 2013    BREAST BIOPSY Right 2017    benign    BREAST CYST EXCISION Left 1982    CARDIAC CATHETERIZATION      CARPAL TUNNEL RELEASE      CATARACT EXTRACTION W/ INTRAOCULAR LENS  IMPLANT, BILATERAL      CHOLECYSTECTOMY WITH INTRAOPERATIVE CHOLANGIOGRAM N/A 2023    Procedure: CHOLECYSTECTOMY LAPAROSCOPIC INTRAOPERATIVE CHOLANGIOGRAM;  Surgeon: Gerardo Arciniega MD;  Location: [facility] OR;  Service: General;  Laterality: N/A;    COLONOSCOPY      COLONOSCOPY N/A 10/01/2021    Procedure: COLONOSCOPY WITH ANESTHESIA;  Surgeon: Tom Velasco DO;  Location: [facility] ENDOSCOPY;  Service: Gastroenterology;  Laterality: N/A;  pre: change in bowel habits  post: diverticulosis. hemorrhoids.   Olivia Mora APRN        CYSTECTOMY      D & C HYSTEROSCOPY N/A 2017    Procedure: DILATATION AND CURETTAGE HYSTEROSCOPY;  Surgeon: Shasta Madrigal MD;  Location: [facility] OR;  Service:     DILATION AND CURETTAGE, DIAGNOSTIC / THERAPEUTIC      ENDOSCOPY  2010    Short segment of Arriola's,Moderate chroninc esophagogastritis and negative H.pylori    ENDOSCOPY N/A 2017    Procedure: ESOPHAGOGASTRODUODENOSCOPY WITH ANESTHESIA;  Surgeon: Tom Velasco DO;  Location: [facility] ENDOSCOPY;  Service:     EYE SURGERY       RETINA    HEMORRHOIDECTOMY SIGMOIDOSCOPY N/A 3/21/2023    Procedure: HEMORRHOIDECTOMY WITH EXAM UNDER ANESTHESIA;  Surgeon: Holly Chavez MD;  Location:  [facility] OR;  Service: General;  Laterality: N/A;    HYSTERECTOMY  12/20/2017    ORIF TIBIA/FIBULA FRACTURES Left 2000    TRANSVAGINAL TAPING SUSPENSION N/A 11/06/2017    Procedure: VAGINAL MESH REVISION;  Surgeon: Shasta Madrigal MD;  Location:  [facility] OR;  Service:     VAGINAL MESH REVISION  2013       SLP Recommendation and Plan  SLP Swallowing Diagnosis: oral dysphagia, R/O pharyngeal dysphagia (10/06/24 1509)  SLP Diet Recommendation: soft to chew textures, chopped, thin liquids (10/06/24 1509)  Recommended Precautions and Strategies: upright posture during/after eating, small bites of food and sips of liquid, alternate between small bites of food and sips of liquid, general aspiration precautions, reflux precautions (10/06/24 1509)  SLP Rec. for Method of Medication Administration: meds whole, with puree, as tolerated (10/06/24 1509)     Monitor for Signs of Aspiration: yes, notify SLP if any concerns (10/06/24 1509)  Recommended Diagnostics: reassess via clinical swallow evaluation (10/06/24 1509)  Swallow Criteria for Skilled Therapeutic Interventions Met: demonstrates skilled criteria (10/06/24 1509)     Rehab Potential/Prognosis, Swallowing: good, to achieve stated therapy goals (10/06/24 1509)  Therapy Frequency (Swallow): at least, 3 days per week (10/06/24 1509)  Predicted Duration Therapy Intervention (Days): 1 week (10/06/24 1509)  Oral Care Recommendations: Oral Care BID/PRN (10/06/24 1509)                                        Plan of Care Reviewed With: patient, caregiver  Progress: no change  Outcome Evaluation: see note      SWALLOW EVALUATION (Last 72 Hours)       SLP Adult Swallow Evaluation       Row Name 10/06/24 1509                   Rehab Evaluation    Document Type evaluation  -JR        Subjective Information complains of;pain   -JR        Patient Observations alert;cooperative  -JR        Patient/Family/Caregiver Comments/Observations no family present  -JR        Patient Effort good  -JR        Symptoms Noted During/After Treatment none  -JR           General Information    Patient Profile Reviewed yes  -JR        Pertinent History Of Current Problem Pt presented to ED d/t AMS. Hx of HTN, GERD, CKD stage III, chronic pain. Dx of sepsis d/t UTI. ST consulted after failed post extubation dysphagia screen.  -JR        Current Method of Nutrition nasogastric feedings  -JR        Precautions/Limitations, Vision WFL;for purposes of eval  -JR        Precautions/Limitations, Hearing WFL;for purposes of eval  -JR        Prior Level of Function-Communication unknown  -JR        Prior Level of Function-Swallowing no diet consistency restrictions  -JR        Plans/Goals Discussed with patient;other (see comments)  RN Pretty  -JR        Patient's Goals for Discharge return to PO diet  -JR        Family Goals for Discharge family did not state  -JR           Pain    Additional Documentation Pain Scale: FACES Pre/Post-Treatment (Group)  -JR           Pain Scale: FACES Pre/Post-Treatment    Pain: FACES Scale, Pretreatment 2-->hurts little bit  -JR        Posttreatment Pain Rating 4-->hurts little more  -JR        Pain Location - Side/Orientation Bilateral  -JR        Pain Location lower  -JR        Pain Location - knee  -JR        Pre/Posttreatment Pain Comment pt repositioned  -JR           Oral Motor Structure and Function    Dentition Assessment natural, present and adequate  -JR        Secretion Management WNL/WFL  -JR        Mucosal Quality dry  -JR        Gag Response WFL  -JR        Volitional Swallow weak  -JR        Volitional Cough WFL  -JR           Oral Musculature and Cranial Nerve Assessment    Oral Motor General Assessment generalized oral motor weakness  -JR           General Eating/Swallowing Observations    Respiratory Support Currently  in Use nasal cannula  -JR        O2 Liters 3L  -JR        Eating/Swallowing Skills fed by SLP  -JR        Positioning During Eating upright in bed  -JR        Utensils Used spoon;straw  -JR        Consistencies Trialed pureed;thin liquids;regular textures  -           Clinical Swallow Eval    Oral Prep Phase impaired  -JR        Oral Transit impaired  -JR        Oral Residue WFL  -JR        Pharyngeal Phase no overt signs/symptoms of pharyngeal impairment  -JR        Esophageal Phase unremarkable  -JR           Oral Prep Concerns    Oral Prep Concerns poor rotary chew  -JR        Poor Rotary Chew regular consistencies  -JR           Oral Transit Concerns    Oral Transit Concerns delayed initiation of bolus transit  -JR        Delayed Intiation of Bolus Transit all consistencies  -           SLP Evaluation Clinical Impression    SLP Swallowing Diagnosis oral dysphagia;R/O pharyngeal dysphagia  -        Functional Impact risk of aspiration/pneumonia  -JR        Rehab Potential/Prognosis, Swallowing good, to achieve stated therapy goals  -        Swallow Criteria for Skilled Therapeutic Interventions Met demonstrates skilled criteria  -           Recommendations    Therapy Frequency (Swallow) at least;3 days per week  -JR        Predicted Duration Therapy Intervention (Days) 1 week  -        SLP Diet Recommendation soft to chew textures;chopped;thin liquids  -        Recommended Diagnostics reassess via clinical swallow evaluation  -        Recommended Precautions and Strategies upright posture during/after eating;small bites of food and sips of liquid;alternate between small bites of food and sips of liquid;general aspiration precautions;reflux precautions  -        Oral Care Recommendations Oral Care BID/PRN  -JR        SLP Rec. for Method of Medication Administration meds whole;with puree;as tolerated  -        Monitor for Signs of Aspiration yes;notify SLP if any concerns  -JR           Swallow  Goals (SLP)    Swallow LTGs Swallow Long Term Goal (free text)  -JR        Swallow STGs diet tolerance goal selection (SLP)  -JR        Diet Tolerance Goal Selection (SLP) Patient will tolerate trials of  -JR           (LTG) Swallow    (LTG) Swallow Pt will tolerate LRD with no overt s/s of aspiration.  -JR        [place] (Swallow Long Term Goal) independently (over 90% accuracy)  -JR        Time Frame (Swallow Long Term Goal) 1 week  -JR        Progress/Outcomes (Swallow Long Term Goal) new goal  -JR           (STG) Patient will tolerate trials of    Consistencies Trialed (Tolerate trials) soft to chew (chopped) textures;soft to chew (whole) textures;regular textures;thin liquids  -JR        Desired Outcome (Tolerate trials) without signs/symptoms of aspiration  -JR        [place] (Tolerate trials) independently (over 90% accuracy)  -JR        Progress/Outcomes (Tolerate trials) new goal  -JR                  User Key  (r) = Recorded By, (t) = Taken By, (c) = Cosigned By      Initials Name Effective Dates    Wendy Jones MS CCC-SLP 08/22/23 -                     EDUCATION  The patient has been educated in the following areas:   Dysphagia (Swallowing Impairment).        SLP GOALS       Row Name 10/06/24 1509             (LTG) Swallow    (LTG) Swallow Pt will tolerate LRD with no overt s/s of aspiration.  -JR      [place] (Swallow Long Term Goal) independently (over 90% accuracy)  -JR      Time Frame (Swallow Long Term Goal) 1 week  -JR      Progress/Outcomes (Swallow Long Term Goal) new goal  -JR         (STG) Patient will tolerate trials of    Consistencies Trialed (Tolerate trials) soft to chew (chopped) textures;soft to chew (whole) textures;regular textures;thin liquids  -JR      Desired Outcome (Tolerate trials) without signs/symptoms of aspiration  -JR      [place] (Tolerate trials) independently (over 90% accuracy)  -JR      Progress/Outcomes (Tolerate trials) new goal  -                 User Key  (r) = Recorded By, (t) = Taken By, (c) = Cosigned By      Initials Name Provider Type    Wendy Jones MS CCC-SLP Speech and Language Pathologist                         Time Calculation:    Time Calculation- SLP       Row Name 10/06/24 1548             Time Calculation- SLP    SLP Start Time 1509  -      SLP Stop Time 1551  -      SLP Time Calculation (min) 42 min  -JR      SLP Received On 10/06/24  -      SLP Goal Re-Cert Due Date 10/16/24  -         Untimed Charges    52911-[id number] Eval Oral Pharyng Swallow Minutes 42  -JR         Total Minutes    Untimed Charges Total Minutes 42  -JR       Total Minutes 42  -JR                User Key  (r) = Recorded By, (t) = Taken By, (c) = Cosigned By      Initials Name Provider Type    Wendy Jones MS CCC-SLP Speech and Language Pathologist                    Therapy Charges for Today       Code Description Service Date Service Provider Modifiers Qty    [id number] HC ST EVAL ORAL PHARYNG SWALLOW 3 10/6/2024 Wendy Sosa MS CCC-SLP GN 1                 Wendy Sosa MS CCC-PACO  10/6/2024

## 2024-10-06 NOTE — PLAN OF CARE
Problem: Skin Injury Risk Increased  Goal: Skin Health and Integrity  Outcome: Progressing  Intervention: Optimize Skin Protection  Recent Flowsheet Documentation  Taken 10/6/2024 1652 by Pretty Woodard RN  Head of Bed (HOB) Positioning: (for dinner tray) HOB at 60 degrees  Taken 10/6/2024 1500 by Pretty Woodard RN  Head of Bed (HOB) Positioning: HOB at 30-45 degrees  Taken 10/6/2024 1300 by Pretty Woodard RN  Head of Bed (HOB) Positioning: HOB at 30-45 degrees  Taken 10/6/2024 1100 by Pretty Woodard RN  Head of Bed (HOB) Positioning: HOB at 30-45 degrees  Taken 10/6/2024 0900 by Pretty Woodard RN  Head of Bed (HOB) Positioning: HOB at 30-45 degrees  Taken 10/6/2024 0755 by Pretty Woodard RN  Pressure Reduction Techniques: pressure points protected  Pressure Reduction Devices:   pressure-redistributing mattress utilized   positioning supports utilized  Skin Protection:   adhesive use limited   incontinence pads utilized   skin-to-device areas padded  Taken 10/6/2024 0700 by Pretty Woodard RN  Head of Bed (HOB) Positioning: HOB at 30-45 degrees     Problem: Adult Inpatient Plan of Care  Goal: Plan of Care Review  Outcome: Progressing  Goal: Patient-Specific Goal (Individualized)  Outcome: Progressing  Goal: Absence of Hospital-Acquired Illness or Injury  Outcome: Progressing  Intervention: Identify and Manage Fall Risk  Recent Flowsheet Documentation  Taken 10/6/2024 1652 by Pretty Woodard RN  Safety Promotion/Fall Prevention: safety round/check completed  Taken 10/6/2024 1600 by Pretty Woodard RN  Safety Promotion/Fall Prevention: safety round/check completed  Taken 10/6/2024 1500 by Pretty Woodard RN  Safety Promotion/Fall Prevention: safety round/check completed  Taken 10/6/2024 1400 by Pretty Woodard RN  Safety Promotion/Fall Prevention: safety round/check completed  Taken 10/6/2024 1300 by Pretty Woodard RN  Safety Promotion/Fall Prevention: safety round/check completed  Taken 10/6/2024 1200 by  Stom, Pretty G, RN  Safety Promotion/Fall Prevention: safety round/check completed  Taken 10/6/2024 1100 by Pretty Woodard RN  Safety Promotion/Fall Prevention: safety round/check completed  Taken 10/6/2024 1000 by Pretty Woodard RN  Safety Promotion/Fall Prevention: safety round/check completed  Taken 10/6/2024 0900 by Pretty Woodard RN  Safety Promotion/Fall Prevention: safety round/check completed  Taken 10/6/2024 0755 by Pretty Woodard RN  Safety Promotion/Fall Prevention: safety round/check completed  Taken 10/6/2024 0700 by Pretty Woodard RN  Safety Promotion/Fall Prevention: safety round/check completed  Intervention: Prevent Skin Injury  Recent Flowsheet Documentation  Taken 10/6/2024 1652 by Pretty Woodard RN  Body Position:   turned   supine  Taken 10/6/2024 1500 by Pretty Woodard RN  Body Position:   turned   left  Taken 10/6/2024 1300 by Pretty Woodard RN  Body Position:   turned   supine  Taken 10/6/2024 1100 by Pretty Woodard, RN  Body Position:   turned   right  Taken 10/6/2024 0900 by Pretty Woodard, RN  Body Position:   turned   left  Taken 10/6/2024 0755 by Pretty Woodard RN  Skin Protection:   adhesive use limited   incontinence pads utilized   skin-to-device areas padded  Taken 10/6/2024 0700 by Pretty Woodard RN  Body Position:   turned   supine  Intervention: Prevent and Manage VTE (Venous Thromboembolism) Risk  Recent Flowsheet Documentation  Taken 10/6/2024 1652 by Pretty Woodard RN  Activity Management: bedrest  Taken 10/6/2024 1600 by Pretty Woodard RN  Activity Management: bedrest  Taken 10/6/2024 1500 by Pretty Woodard RN  Activity Management: bedrest  Taken 10/6/2024 1400 by Pretty Woodard RN  Activity Management: bedrest  Taken 10/6/2024 1300 by Pretty Woodard RN  Activity Management: bedrest  Taken 10/6/2024 1200 by Pretty Woodard RN  Activity Management: bedrest  Taken 10/6/2024 1100 by Pretty Woodard RN  Activity Management: bedrest  Taken 10/6/2024 1000 by Yamilet  Pretty HASKINS RN  Activity Management: bedrest  Taken 10/6/2024 0900 by Pretty Woodard RN  Activity Management: bedrest  Taken 10/6/2024 0755 by Pretty Woodard RN  Activity Management: bedrest  VTE Prevention/Management:   bilateral   sequential compression devices on  Taken 10/6/2024 0700 by Pretty Woodard RN  Activity Management: bedrest  Goal: Optimal Comfort and Wellbeing  Outcome: Progressing  Intervention: Monitor Pain and Promote Comfort  Recent Flowsheet Documentation  Taken 10/6/2024 1652 by Pretty Woodard RN  Pain Management Interventions: see MAR  Taken 10/6/2024 1600 by Pretty Woodard RN  Pain Management Interventions: position adjusted  Goal: Readiness for Transition of Care  Outcome: Progressing     Problem: Nutrition Impairment (Mechanical Ventilation, Invasive)  Goal: Optimal Nutrition Delivery  Outcome: Progressing     Problem: Adjustment to Illness (Sepsis/Septic Shock)  Goal: Optimal Coping  Outcome: Progressing     Problem: Bleeding (Sepsis/Septic Shock)  Goal: Absence of Bleeding  Outcome: Progressing     Problem: Glycemic Control Impaired (Sepsis/Septic Shock)  Goal: Blood Glucose Level Within Desired Range  Outcome: Progressing     Problem: Infection Progression (Sepsis/Septic Shock)  Goal: Absence of Infection Signs and Symptoms  Outcome: Progressing  Intervention: Promote Recovery  Recent Flowsheet Documentation  Taken 10/6/2024 1652 by Pretty Woodard RN  Activity Management: bedrest  Taken 10/6/2024 1600 by Pretty Woodard RN  Activity Management: bedrest  Taken 10/6/2024 1500 by Pretty Woodard RN  Activity Management: bedrest  Taken 10/6/2024 1400 by Pretty Woodard RN  Activity Management: bedrest  Taken 10/6/2024 1300 by Pretty Woodard RN  Activity Management: bedrest  Taken 10/6/2024 1200 by Pretty Woodard RN  Activity Management: bedrest  Taken 10/6/2024 1100 by Pretty Woodard RN  Activity Management: bedrest  Taken 10/6/2024 1000 by Pretty Woodard RN  Activity Management:  bedrest  Taken 10/6/2024 0900 by Pretty Woodard RN  Activity Management: bedrest  Taken 10/6/2024 0755 by Pretty Woodard RN  Activity Management: bedrest  Taken 10/6/2024 0700 by Pretty Woodard RN  Activity Management: bedrest     Problem: Nutrition Impaired (Sepsis/Septic Shock)  Goal: Optimal Nutrition Intake  Outcome: Progressing     Problem: Restraint, Nonviolent  Goal: Absence of Harm or Injury  Outcome: Progressing  Intervention: Implement Least Restrictive Safety Strategies  Recent Flowsheet Documentation  Taken 10/6/2024 1200 by Pretty Woodard RN  Medical Device Protection:   IV pole/bag removed from visual field   torso covered   tubing secured  Less Restrictive Alternative:   bed alarm in use   calming techniques promoted   environment adjusted   safety enhancements provided   sensory stimulation limited  De-Escalation Techniques:   increased round frequency   stimulation decreased  Diversional Activities: television  Taken 10/6/2024 1000 by Pretty Woodard RN  Medical Device Protection:   IV pole/bag removed from visual field   torso covered   tubing secured  Less Restrictive Alternative:   bed alarm in use   calming techniques promoted   environment adjusted   safety enhancements provided   sensory stimulation limited  De-Escalation Techniques:   increased round frequency   stimulation decreased  Diversional Activities: television  Taken 10/6/2024 0900 by Pretty Woodard RN  Medical Device Protection:   IV pole/bag removed from visual field   tubing secured  Taken 10/6/2024 0837 by Pretty Woodard RN  Medical Device Protection:   IV pole/bag removed from visual field   torso covered   tubing secured  Less Restrictive Alternative:   bed alarm in use   calming techniques promoted   environment adjusted   safety enhancements provided   sensory stimulation limited  De-Escalation Techniques:   increased round frequency   stimulation decreased  Diversional Activities: television  Intervention: Protect Skin  and Joint Integrity  Recent Flowsheet Documentation  Taken 10/6/2024 1652 by Pretty Woodard RN  Body Position:   turned   supine  Taken 10/6/2024 1500 by Pretty Woodard, RN  Body Position:   turned   left  Taken 10/6/2024 1300 by Pretty Woodard, RN  Body Position:   turned   supine  Taken 10/6/2024 1100 by Pretty Woodard, RN  Body Position:   turned   right  Taken 10/6/2024 0900 by Pretty Woodard, RN  Body Position:   turned   left  Taken 10/6/2024 0700 by Pretty Woodard, RN  Body Position:   turned   supine     Problem: Aspiration (Enteral Nutrition)  Goal: Absence of Aspiration Signs and Symptoms  Outcome: Progressing  Intervention: Minimize Aspiration Risk  Recent Flowsheet Documentation  Taken 10/6/2024 1652 by Pretty Woodard RN  Head of Bed (HOB) Positioning: (for dinner tray) HOB at 60 degrees  Taken 10/6/2024 1500 by Pretty Woodard RN  Head of Bed (HOB) Positioning: HOB at 30-45 degrees  Taken 10/6/2024 1300 by Pretty Woodard RN  Head of Bed (HOB) Positioning: HOB at 30-45 degrees  Taken 10/6/2024 1100 by Pretty Woodard RN  Head of Bed (HOB) Positioning: HOB at 30-45 degrees  Taken 10/6/2024 0900 by Pretty Woodard RN  Head of Bed (HOB) Positioning: HOB at 30-45 degrees  Taken 10/6/2024 0700 by Pretty Woodard RN  Head of Bed (HOB) Positioning: HOB at 30-45 degrees     Problem: Device-Related Complication Risk (Enteral Nutrition)  Goal: Safe, Effective Therapy Delivery  Outcome: Progressing     Problem: Feeding Intolerance (Enteral Nutrition)  Goal: Feeding Tolerance  Outcome: Progressing     Problem: Fall Injury Risk  Goal: Absence of Fall and Fall-Related Injury  Outcome: Progressing  Intervention: Promote Injury-Free Environment  Recent Flowsheet Documentation  Taken 10/6/2024 1652 by Pretty Woodard RN  Safety Promotion/Fall Prevention: safety round/check completed  Taken 10/6/2024 1600 by Pretty Woodard RN  Safety Promotion/Fall Prevention: safety round/check completed  Taken 10/6/2024 1500 by  Stom, Pretty G, RN  Safety Promotion/Fall Prevention: safety round/check completed  Taken 10/6/2024 1400 by Pretty Woodard RN  Safety Promotion/Fall Prevention: safety round/check completed  Taken 10/6/2024 1300 by Pretty Woodard RN  Safety Promotion/Fall Prevention: safety round/check completed  Taken 10/6/2024 1200 by Pretty Woodard RN  Safety Promotion/Fall Prevention: safety round/check completed  Taken 10/6/2024 1100 by Pretty Woodard RN  Safety Promotion/Fall Prevention: safety round/check completed  Taken 10/6/2024 1000 by Pretty Woodard RN  Safety Promotion/Fall Prevention: safety round/check completed  Taken 10/6/2024 0900 by Pretty Woodard RN  Safety Promotion/Fall Prevention: safety round/check completed  Taken 10/6/2024 0755 by Pretty Woodard RN  Safety Promotion/Fall Prevention: safety round/check completed  Taken 10/6/2024 0700 by Pretty Woodard RN  Safety Promotion/Fall Prevention: safety round/check completed   Goal Outcome Evaluation:      Extubated and now on 2 liters NC with O2 sats 98%. Patient with increased pain and restlesness, found it very difficult to get comfortable. Percocet 5 mg given in addition to scheduled nic with great outcome. Peptamen AF continues at 30 mL/hr. Did not increase last time d/t eating 30% of dinner tray. Temp max 101.4. Tylenol given with good results. Indwelling catheter remains with adequate output. SR in 90s. BP WNL

## 2024-10-06 NOTE — PROGRESS NOTES
RT EQUIPMENT DEVICE RELATED - SKIN ASSESSMENT    Maikol Score:  Maikol Score: 14     RT Medical Equipment/Device:     ETT Baugh/Anchorfast    Skin Assessment:      Cheek:  Intact  Neck:  Intact  Lips:  Intact  Mouth:  Intact    Device Skin Pressure Protection:  Skin-to-device areas padded:  Anchorfast    Nurse Notification:  Sandy Lopez, RRT

## 2024-10-06 NOTE — PLAN OF CARE
Goal Outcome Evaluation:  Plan of Care Reviewed With: patient, caregiver        Progress: no change  Outcome Evaluation: see note                 SLP Swallowing Diagnosis: oral dysphagia, R/O pharyngeal dysphagia (10/06/24 4660)

## 2024-10-06 NOTE — PROGRESS NOTES
[facility] Intensivist Services  INPATIENT PROGRESS NOTE    Patient Name: Jennifer Gomes  Date of Admission: 10/5/2024  Today's Date: 10/06/24  Length of Stay: 1  Primary Care Physician: Provider, No Known    Subjective   Chief Complaint: confused/weak  HPI   Mrs. Gomes is an 84-year-old female with past medical history of hypertension, GERD, chronic kidney disease stage III, and chronic pain who presented to [facility] emergency department early this morning via EMS for altered mental status with weakness. My HPI comes from ER provider, Dr. Castillo, and daughter (Ivonne) who is her medical decision maker and care provider who explains that Mrs. Gomes has had loose stool for the last week causing her to be incontinent at times.  Daughter explains that the stool is loose and brown without evidence of blood, and is foul smelling. She reports urinary incontinence that is also foul-smelling and blood-tinged.  She explains that early this morning the patient was up to the bedside commode and became very disoriented and weak therefore they called the ambulance to bring her to the hospital for further evaluation. Ivonne advises that Mrs. oGmes is a DNR/DNI.  Unfortunately, she had a respiratory event in the ER and the ER initiated a code blue.  Patient required short course of CPR 5-10 minutes and was intubated prior to sending to ICU.    10/6:  Hypothermia protocol stopped.  no new problems overnight.  + blood cultures.  Remains sedated on vent.+ fever    Review of Systems   All pertinent negatives and positives are as above. All other systems have been reviewed and are negative unless otherwise stated.     Objective    Temp:  [96.8 °F (36 °C)-102 °F (38.9 °C)] 101.9 °F (38.8 °C)  Heart Rate:  [] 83  Resp:  [14-19] 19  BP: ()/(33-86) 111/43  FiO2 (%):  [30 %-40 %] 30 %    Physical Exam  Intubated, sedated  Pupils equal  + spontaneous, purposeful movement  RRR  Coarse  bs bilat  Soft, ND  No c/c/e    Results Reviewed:  Lab Results (last 24 hours)       Procedure Component Value Units Date/Time    POC Glucose Once [[id number]]  (Normal) Collected: 10/06/24 0624    Specimen: Blood Updated: 10/06/24 0636     Glucose 126 mg/dL      Comment: : [id number] Lawson BurnsMeter ID: [id number]       Blood Culture - Blood, Arm, Left [[id number]]  (Abnormal) Collected: 10/05/24 0202    Specimen: Blood from Arm, Left Updated: 10/06/24 0623     Blood Culture Gram Negative Bacilli     Isolated from --     Gram Stain Pediatric Bottle Gram negative bacilli    Blood Culture - Blood, Arm, Right [[id number]]  (Abnormal) Collected: 10/05/24 0201    Specimen: Blood from Arm, Right Updated: 10/06/24 0623     Blood Culture Gram Negative Bacilli     Isolated from Aerobic and Anaerobic Bottles     Gram Stain Anaerobic Bottle Gram negative bacilli      Aerobic Bottle Gram negative bacilli    Blood Gas, Arterial - [[id number]]  (Abnormal) Collected: 10/06/24 0339    Specimen: Arterial Blood Updated: 10/06/24 0345     Site Right Radial     Donny's Test Positive     pH, Arterial 7.351 pH units      pCO2, Arterial 42.5 mm Hg      pO2, Arterial 80.7 mm Hg      Comment: 84 Value below reference range        HCO3, Arterial 23.5 mmol/L      Base Excess, Arterial -2.0 mmol/L      Comment: 84 Value below reference range        O2 Saturation, Arterial 96.8 %      Temperature 37.0     Barometric Pressure for Blood Gas 753 mmHg      Modality Ventilator     FIO2 30 %      Ventilator Mode AC     Set Tidal Volume 400.000     Set Mech Resp Rate 14.0     PEEP 5.0     Collected by [id number]     Comment: Meter: [id number]     :  Eugene Lopez, ALPHONSO        pCO2, Temperature Corrected 42.5 mm Hg      pH, Temp Corrected 7.351 pH Units      pO2, Temperature Corrected 80.7 mm Hg      PO2/FIO2 269    Renal Function Panel [[id number]]  (Abnormal) Collected: 10/06/24 0240    Specimen: Blood Updated: 10/06/24 0332      Glucose 119 mg/dL      BUN 44 mg/dL      Creatinine 2.40 mg/dL      Sodium 134 mmol/L      Potassium 3.4 mmol/L      Comment: Slight hemolysis detected by analyzer. Result may be falsely elevated.        Chloride 101 mmol/L      CO2 22.0 mmol/L      Calcium 8.0 mg/dL      Albumin 2.3 g/dL      Phosphorus 3.4 mg/dL      Anion Gap 11.0 mmol/L      BUN/Creatinine Ratio 18.3     eGFR 19.7 mL/min/1.73     Narrative:      GFR Normal >60  Chronic Kidney Disease <60  Kidney Failure <15    The GFR formula is only valid for adults with stable renal function between ages 18 and 70.    Magnesium [[id number]]  (Normal) Collected: 10/06/24 0240    Specimen: Blood Updated: 10/06/24 0332     Magnesium 1.6 mg/dL     CBC (No Diff) [[id number]]  (Abnormal) Collected: 10/06/24 0240    Specimen: Blood Updated: 10/06/24 0325     WBC 16.64 10*3/mm3      RBC 2.13 10*6/mm3      Hemoglobin 6.3 g/dL      Hematocrit 19.2 %      MCV 90.1 fL      MCH 29.6 pg      MCHC 32.8 g/dL      RDW 14.4 %      RDW-SD 46.7 fl      MPV 11.6 fL      Platelets 140 10*3/mm3     POC Glucose Once [[id number]]  (Normal) Collected: 10/05/24 2335    Specimen: Blood Updated: 10/05/24 2346     Glucose 109 mg/dL      Comment: : [id number] Lawson TaylorMeter ID: [id number]       POC Glucose Once [[id number]]  (Normal) Collected: 10/05/24 1803    Specimen: Blood Updated: 10/05/24 1814     Glucose 115 mg/dL      Comment: : [id number] Chapito BullenMeter ID: [id number]       Blood Culture ID, PCR - Blood, Arm, Right [[id number]]  (Abnormal) Collected: 10/05/24 0201    Specimen: Blood from Arm, Right Updated: 10/05/24 1531     BCID, PCR Escherichia coli. Identification by BCID2 PCR.     BOTTLE TYPE Anaerobic Bottle    Narrative:      No resistance genes detected.    Urine Culture - Urine, Urine, Catheter [[id number]]  (Normal) Collected: 10/05/24 0208    Specimen: Urine, Catheter Updated: 10/05/24 1327     Urine Culture Culture in progress    High Sensitivity Troponin T 2Hr  "[[id number]]  (Abnormal) Collected: 10/05/24 1117    Specimen: Blood Updated: 10/05/24 1151     HS Troponin T 71 ng/L      Troponin T Delta -2 ng/L     Narrative:      High Sensitive Troponin T Reference Range:  <14.0 ng/L- Negative Female for AMI  <22.0 ng/L- Negative Male for AMI  >=14 - Abnormal Female indicating possible myocardial injury.  >=22 - Abnormal Male indicating possible myocardial injury.   Clinicians would have to utilize clinical acumen, EKG, Troponin, and serial changes to determine if it is an Acute Myocardial Infarction or myocardial injury due to an underlying chronic condition.         Procalcitonin [[id number]]  (Abnormal) Collected: 10/05/24 0927    Specimen: Blood Updated: 10/05/24 1128     Procalcitonin 65.13 ng/mL     Narrative:      As a Marker for Sepsis (Non-Neonates):    1. <0.5 ng/mL represents a low risk of severe sepsis and/or septic shock.  2. >2 ng/mL represents a high risk of severe sepsis and/or septic shock.    As a Marker for Lower Respiratory Tract Infections that require antibiotic therapy:    PCT on Admission    Antibiotic Therapy       6-12 Hrs later    >0.5                Strongly Recommended  >0.25 - <0.5        Recommended   0.1 - 0.25          Discouraged              Remeasure/reassess PCT  <0.1                Strongly Discouraged     Remeasure/reassess PCT    As 28 day mortality risk marker: \"Change in Procalcitonin Result\" (>80% or <=80%) if Day 0 (or Day 1) and Day 4 values are available. Refer to http://[email or web address]-pct-calculator.com    Change in PCT <=80%  A decrease of PCT levels below or equal to 80% defines a positive change in PCT test result representing a higher risk for 28-day all-cause mortality of patients diagnosed with severe sepsis for septic shock.    Change in PCT >80%  A decrease of PCT levels of more than 80% defines a negative change in PCT result representing a lower risk for 28-day all-cause mortality of patients diagnosed with severe sepsis or " septic shock.       Blood Gas, Arterial - [[id number]]  (Abnormal) Collected: 10/05/24 1105    Specimen: Arterial Blood Updated: 10/05/24 1109     Site Right Brachial     Donny's Test Positive     pH, Arterial 7.362 pH units      pCO2, Arterial 44.2 mm Hg      pO2, Arterial 127.0 mm Hg      Comment: 83 Value above reference range        HCO3, Arterial 25.1 mmol/L      Base Excess, Arterial -0.4 mmol/L      Comment: 84 Value below reference range        O2 Saturation, Arterial 99.5 %      Comment: 83 Value above reference range        Temperature 37.0     Barometric Pressure for Blood Gas 757 mmHg      Modality Ventilator     FIO2 40 %      Ventilator Mode AC     Set Tidal Volume 400.000     Set Mech Resp Rate 14.0     PEEP 5.0     Collected by [id number]     Comment: Meter: [id number]     :  Sandee Matias CRT        pCO2, Temperature Corrected 44.2 mm Hg      pH, Temp Corrected 7.362 pH Units      pO2, Temperature Corrected 127 mm Hg      PO2/FIO2 318    High Sensitivity Troponin T [[id number]]  (Abnormal) Collected: 10/05/24 0927    Specimen: Blood Updated: 10/05/24 1040     HS Troponin T 73 ng/L     Narrative:      High Sensitive Troponin T Reference Range:  <14.0 ng/L- Negative Female for AMI  <22.0 ng/L- Negative Male for AMI  >=14 - Abnormal Female indicating possible myocardial injury.  >=22 - Abnormal Male indicating possible myocardial injury.   Clinicians would have to utilize clinical acumen, EKG, Troponin, and serial changes to determine if it is an Acute Myocardial Infarction or myocardial injury due to an underlying chronic condition.         Renal Function Panel [[id number]]  (Abnormal) Collected: 10/05/24 0927    Specimen: Blood Updated: 10/05/24 0953     Glucose 179 mg/dL      BUN 45 mg/dL      Creatinine 2.19 mg/dL      Sodium 136 mmol/L      Potassium 3.5 mmol/L      Chloride 102 mmol/L      CO2 23.0 mmol/L      Calcium 8.5 mg/dL      Albumin 2.5 g/dL      Phosphorus 3.4 mg/dL      Anion  Gap 11.0 mmol/L      BUN/Creatinine Ratio 20.5     eGFR 22.0 mL/min/1.73     Narrative:      GFR Normal >60  Chronic Kidney Disease <60  Kidney Failure <15    The GFR formula is only valid for adults with stable renal function between ages 18 and 70.    CBC (No Diff) [[id number]]  (Abnormal) Collected: 10/05/24 0927    Specimen: Blood Updated: 10/05/24 0937     WBC 25.39 10*3/mm3      RBC 2.43 10*6/mm3      Hemoglobin 7.1 g/dL      Hematocrit 23.3 %      MCV 95.9 fL      MCH 29.2 pg      MCHC 30.5 g/dL      RDW 14.3 %      RDW-SD 50.1 fl      MPV 10.9 fL      Platelets 171 10*3/mm3             XR Abdomen KUB    Result Date: 10/5/2024  FINDINGS/impression:  Interval removal of the initial enteric tube.  Interval advancement of the new enteric tube with weighted tip terminating over the gastric body/fundus region.      This report was signed and finalized on 10/5/2024 5:31 PM by Sunny Degroot.      XR Abdomen KUB    Result Date: 10/5/2024  Findings/impression:  Previously noted enteric tube extends below the diaphragm into the stomach with the sidehole overlying the gastric body and tip overlying the gastric antrum.  There appears to be placement of a second enteric tube with weighted distal tip. This appears to be curled upon itself at the level of the GE junction. Recommend repositioning.    This report was signed and finalized on 10/5/2024 4:57 PM by Sunny Degroot.      XR Abdomen KUB    Result Date: 10/5/2024  1. Well-positioned OG tube.    This report was signed and finalized on 10/5/2024 9:04 AM by Dr. Adam Monroy MD.      XR Abdomen KUB    Result Date: 10/5/2024  1. No acute abnormality.    This report was signed and finalized on 10/5/2024 6:37 AM by Dr. Adam Monroy MD.      XR Chest 1 View    Result Date: 10/5/2024  1. No acute disease.      This report was signed and finalized on 10/5/2024 6:36 AM by Dr. Adam Monroy MD.      CT Abdomen Pelvis Without Contrast    Result Date: 10/5/2024  1. Moderately  distended urinary bladder with bilateral hydronephrosis and ureteral dilation compatible with vesicoureteral reflux. 2. Indeterminant small amount of air within the RIGHT renal collecting system and upper RIGHT ureter. Correlate with urinalysis. 3. Constipation. 4. No mass, bowel obstruction, or abscess.  This report was signed and finalized on 10/5/2024 6:29 AM by Dr. Adam Monroy MD.      CT Head Without Contrast    Result Date: 10/5/2024  1. Motion limited exam. 2. No intracranial hematoma or midline shift. 3. Ventricle size is appropriate.  This report was signed and finalized on 10/5/2024 6:24 AM by Dr. Adam Monroy MD.      XR Chest 1 View    Result Date: 10/5/2024  1. Well-positioned tubes. 2. Interstitial infiltrate for which failure change in interstitial edema is favored. 3. No pneumothorax.    This report was signed and finalized on 10/5/2024 6:07 AM by Dr. Adam Monroy MD.       Result Review:  I have personally reviewed the results from the time of this admission to 10/6/2024 08:12 CDT and agree with these findings:  [x]  Laboratory list / accordion  [x]  Microbiology  [x]  Radiology  [x]  EKG/Telemetry   []  Cardiology/Vascular   []  Pathology  []  Old records  []  Other:  Most notable findings include:   + blood cultures      Culture Data:   Blood Culture   Date Value Ref Range Status   10/05/2024 Gram Negative Bacilli (C)  Preliminary   10/05/2024 Gram Negative Bacilli (C)  Preliminary     Urine Culture   Date Value Ref Range Status   10/05/2024 Culture in progress  Preliminary       I have reviewed the patient's current medications.     Assessment/Plan   Assessment  Active Hospital Problems    Diagnosis     **Sepsis     Sepsis due to urinary tract infection        Assessment/Plan  E.coli Sepsis/bacteremia - sens pending, D#3 abx    Cardiopulmonary arrest - likely secondary to metabolic acidosis    Sepsis secondary to UTI - UTI appears to be the result of urinary retention, may need to keep  sarahi    Acute renal failure - electrolyte replacement protocol is contraindicated in setting of worsening renal function.  Continue IVF for now.  Caution with K replacement.    Dementia  Chronic pain/osteoarthritis - opioid dependent.  Poor pain control with constant pain.  Continued scheduled rx in ICU at lower dose, may need to increase with decreased sedation.  Pain control/patient comfort is important to family.    HTN - monitor for now    Anemia - drop in hbg, PRBC today    Hyponatremia - monitor for now.    RAFAL/RLS - not using CPAP    H/o PUD - continue PPI    DVT ppx - sq heparin will need to be held due to drop in hbg.  SCD only for now.  GI ppx- ppi  DNR - discussed with daughter at length.  Continue with current management for now.  No plans to re-intubate after extubation.  She would like to have hospice at discharge.    Will start to reduce sedation and evaluate ability to proceed with sbt.  40 min CCT    Electronically signed by Zeinab Trujillo MD on 10/6/2024 at 08:12 CDT

## 2024-10-06 NOTE — PLAN OF CARE
Problem: Inability to Wean (Mechanical Ventilation, Invasive)  Goal: Mechanical Ventilation Liberation  Outcome: Progressing   Goal Outcome Evaluation:   To wean from ventilator

## 2024-10-06 NOTE — PROGRESS NOTES
Placed patient on PSV 8/5 30% at 1132.  Patient did well, with Vt in 700s and SBI of 33.  Extubated patient to 3 lpm nasal cannula at 1227.

## 2024-10-06 NOTE — PROGRESS NOTES
RT EQUIPMENT DEVICE RELATED - SKIN ASSESSMENT    Maikol Score:  Maikol Score: 15     RT Medical Equipment/Device:     ETT Baugh/Anchorfast    Skin Assessment:      Cheek:  Intact    Device Skin Pressure Protection:  Skin-to-device areas padded:  Anchorfast    Nurse Notification:  Sandy Matias, CRT

## 2024-10-07 LAB
ALBUMIN SERPL-MCNC: 2.3 G/DL (ref 3.5–5.2)
ANION GAP SERPL CALCULATED.3IONS-SCNC: 9 MMOL/L (ref 5–15)
BACTERIA SPEC AEROBE CULT: ABNORMAL
BACTERIA SPEC AEROBE CULT: ABNORMAL
BH BB BLOOD EXPIRATION DATE: NORMAL
BH BB BLOOD TYPE BARCODE: 5100
BH BB DISPENSE STATUS: NORMAL
BH BB PRODUCT CODE: NORMAL
BH BB UNIT NUMBER: NORMAL
BUN SERPL-MCNC: 43 MG/DL (ref 8–23)
BUN/CREAT SERPL: 19.7 (ref 7–25)
CALCIUM SPEC-SCNC: 8.2 MG/DL (ref 8.6–10.5)
CHLORIDE SERPL-SCNC: 107 MMOL/L (ref 98–107)
CO2 SERPL-SCNC: 21 MMOL/L (ref 22–29)
CREAT SERPL-MCNC: 2.18 MG/DL (ref 0.57–1)
CROSSMATCH INTERPRETATION: NORMAL
DEPRECATED RDW RBC AUTO: 51.7 FL (ref 37–54)
EGFRCR SERPLBLD CKD-EPI 2021: 22.1 ML/MIN/1.73
ERYTHROCYTE [DISTWIDTH] IN BLOOD BY AUTOMATED COUNT: 15.8 % (ref 12.3–15.4)
GLUCOSE BLDC GLUCOMTR-MCNC: 172 MG/DL (ref 70–130)
GLUCOSE SERPL-MCNC: 138 MG/DL (ref 65–99)
GRAM STN SPEC: ABNORMAL
HCT VFR BLD AUTO: 23.2 % (ref 34–46.6)
HCT VFR BLD AUTO: 23.8 % (ref 34–46.6)
HGB BLD-MCNC: 7.2 G/DL (ref 12–15.9)
HGB BLD-MCNC: 7.4 G/DL (ref 12–15.9)
ISOLATED FROM: ABNORMAL
ISOLATED FROM: ABNORMAL
MCH RBC QN AUTO: 27.9 PG (ref 26.6–33)
MCHC RBC AUTO-ENTMCNC: 31 G/DL (ref 31.5–35.7)
MCV RBC AUTO: 89.9 FL (ref 79–97)
PHOSPHATE SERPL-MCNC: 3.2 MG/DL (ref 2.5–4.5)
PLATELET # BLD AUTO: 127 10*3/MM3 (ref 140–450)
PMV BLD AUTO: 11.8 FL (ref 6–12)
POTASSIUM SERPL-SCNC: 3.6 MMOL/L (ref 3.5–5.2)
RBC # BLD AUTO: 2.58 10*6/MM3 (ref 3.77–5.28)
SODIUM SERPL-SCNC: 137 MMOL/L (ref 136–145)
UNIT  ABO: NORMAL
UNIT  RH: NORMAL
WBC NRBC COR # BLD AUTO: 12.52 10*3/MM3 (ref 3.4–10.8)

## 2024-10-07 PROCEDURE — 85027 COMPLETE CBC AUTOMATED: CPT | Performed by: INTERNAL MEDICINE

## 2024-10-07 PROCEDURE — 99222 1ST HOSP IP/OBS MODERATE 55: CPT | Performed by: CLINICAL NURSE SPECIALIST

## 2024-10-07 PROCEDURE — 25010000002 CEFTRIAXONE PER 250 MG: Performed by: PHYSICIAN ASSISTANT

## 2024-10-07 PROCEDURE — 82948 REAGENT STRIP/BLOOD GLUCOSE: CPT

## 2024-10-07 PROCEDURE — 25010000002 CEFTRIAXONE PER 250 MG: Performed by: INTERNAL MEDICINE

## 2024-10-07 PROCEDURE — 99497 ADVNCD CARE PLAN 30 MIN: CPT | Performed by: CLINICAL NURSE SPECIALIST

## 2024-10-07 PROCEDURE — 97166 OT EVAL MOD COMPLEX 45 MIN: CPT

## 2024-10-07 PROCEDURE — 25010000002 HYDROMORPHONE PER 4 MG: Performed by: INTERNAL MEDICINE

## 2024-10-07 PROCEDURE — 97162 PT EVAL MOD COMPLEX 30 MIN: CPT

## 2024-10-07 PROCEDURE — 85014 HEMATOCRIT: CPT | Performed by: INTERNAL MEDICINE

## 2024-10-07 PROCEDURE — 85018 HEMOGLOBIN: CPT | Performed by: INTERNAL MEDICINE

## 2024-10-07 PROCEDURE — 87040 BLOOD CULTURE FOR BACTERIA: CPT | Performed by: INTERNAL MEDICINE

## 2024-10-07 PROCEDURE — 99222 1ST HOSP IP/OBS MODERATE 55: CPT | Performed by: UROLOGY

## 2024-10-07 PROCEDURE — 80069 RENAL FUNCTION PANEL: CPT | Performed by: INTERNAL MEDICINE

## 2024-10-07 PROCEDURE — 25010000002 HEPARIN (PORCINE) PER 1000 UNITS: Performed by: INTERNAL MEDICINE

## 2024-10-07 PROCEDURE — 92526 ORAL FUNCTION THERAPY: CPT

## 2024-10-07 RX ORDER — OXYCODONE HYDROCHLORIDE 5 MG/1
10 TABLET ORAL EVERY 6 HOURS PRN
Status: DISCONTINUED | OUTPATIENT
Start: 2024-10-07 | End: 2024-10-07

## 2024-10-07 RX ORDER — DONEPEZIL HYDROCHLORIDE 10 MG/1
10 TABLET, FILM COATED ORAL NIGHTLY
Status: DISCONTINUED | OUTPATIENT
Start: 2024-10-07 | End: 2024-10-15 | Stop reason: HOSPADM

## 2024-10-07 RX ORDER — ATORVASTATIN CALCIUM 40 MG/1
40 TABLET, FILM COATED ORAL DAILY
Status: DISCONTINUED | OUTPATIENT
Start: 2024-10-07 | End: 2024-10-15 | Stop reason: HOSPADM

## 2024-10-07 RX ORDER — SUMATRIPTAN 50 MG/1
50 TABLET, FILM COATED ORAL DAILY PRN
COMMUNITY

## 2024-10-07 RX ORDER — IPRATROPIUM BROMIDE 21 UG/1
2 SPRAY, METERED NASAL 2 TIMES DAILY
COMMUNITY
End: 2024-10-15 | Stop reason: HOSPADM

## 2024-10-07 RX ORDER — LANSOPRAZOLE 30 MG/1
30 CAPSULE, DELAYED RELEASE ORAL DAILY
COMMUNITY

## 2024-10-07 RX ORDER — BUTALBITAL, ACETAMINOPHEN AND CAFFEINE 50; 325; 40 MG/1; MG/1; MG/1
1 TABLET ORAL 2 TIMES DAILY
COMMUNITY
End: 2024-10-15 | Stop reason: HOSPADM

## 2024-10-07 RX ORDER — OXYCODONE HYDROCHLORIDE 5 MG/1
10 TABLET ORAL EVERY 4 HOURS PRN
Status: DISCONTINUED | OUTPATIENT
Start: 2024-10-07 | End: 2024-10-15 | Stop reason: HOSPADM

## 2024-10-07 RX ORDER — LEVOTHYROXINE SODIUM 100 UG/1
50 TABLET ORAL
Status: DISCONTINUED | OUTPATIENT
Start: 2024-10-07 | End: 2024-10-15 | Stop reason: HOSPADM

## 2024-10-07 RX ORDER — OMEGA-3S/DHA/EPA/FISH OIL/D3 300MG-1000
800 CAPSULE ORAL DAILY
Status: DISCONTINUED | OUTPATIENT
Start: 2024-10-07 | End: 2024-10-15 | Stop reason: HOSPADM

## 2024-10-07 RX ORDER — HEPARIN SODIUM 5000 [USP'U]/ML
5000 INJECTION, SOLUTION INTRAVENOUS; SUBCUTANEOUS EVERY 8 HOURS SCHEDULED
Status: DISCONTINUED | OUTPATIENT
Start: 2024-10-07 | End: 2024-10-15 | Stop reason: HOSPADM

## 2024-10-07 RX ORDER — SERTRALINE HYDROCHLORIDE 25 MG/1
25 TABLET, FILM COATED ORAL DAILY
COMMUNITY

## 2024-10-07 RX ORDER — OXYCODONE HYDROCHLORIDE 15 MG/1
1 TABLET, COATED ORAL 4 TIMES DAILY
COMMUNITY
End: 2024-10-15 | Stop reason: HOSPADM

## 2024-10-07 RX ORDER — POTASSIUM CHLORIDE 1.5 G/1.58G
20 POWDER, FOR SOLUTION ORAL ONCE
Status: COMPLETED | OUTPATIENT
Start: 2024-10-07 | End: 2024-10-07

## 2024-10-07 RX ORDER — VENLAFAXINE 25 MG/1
25 TABLET ORAL DAILY
COMMUNITY
End: 2024-10-15 | Stop reason: HOSPADM

## 2024-10-07 RX ADMIN — MUPIROCIN 1 APPLICATION: 20 OINTMENT TOPICAL at 20:50

## 2024-10-07 RX ADMIN — POTASSIUM CHLORIDE 20 MEQ: 1.5 POWDER, FOR SOLUTION ORAL at 12:17

## 2024-10-07 RX ADMIN — SODIUM CHLORIDE 1000 MG: 900 INJECTION INTRAVENOUS at 12:17

## 2024-10-07 RX ADMIN — DOCUSATE SODIUM 50 MG AND SENNOSIDES 8.6 MG 2 TABLET: 8.6; 5 TABLET, FILM COATED ORAL at 15:05

## 2024-10-07 RX ADMIN — MUPIROCIN 1 APPLICATION: 20 OINTMENT TOPICAL at 10:20

## 2024-10-07 RX ADMIN — OXYCODONE HYDROCHLORIDE 10 MG: 5 TABLET ORAL at 19:59

## 2024-10-07 RX ADMIN — CARVEDILOL 12.5 MG: 6.25 TABLET, FILM COATED ORAL at 10:20

## 2024-10-07 RX ADMIN — CARVEDILOL 12.5 MG: 6.25 TABLET, FILM COATED ORAL at 20:50

## 2024-10-07 RX ADMIN — HEPARIN SODIUM 5000 UNITS: 5000 INJECTION, SOLUTION INTRAVENOUS; SUBCUTANEOUS at 21:00

## 2024-10-07 RX ADMIN — HEPARIN SODIUM 5000 UNITS: 5000 INJECTION, SOLUTION INTRAVENOUS; SUBCUTANEOUS at 15:15

## 2024-10-07 RX ADMIN — OXYCODONE HYDROCHLORIDE 10 MG: 5 TABLET ORAL at 00:18

## 2024-10-07 RX ADMIN — LEVOTHYROXINE SODIUM 50 MCG: 100 TABLET ORAL at 12:17

## 2024-10-07 RX ADMIN — OXYCODONE HYDROCHLORIDE 10 MG: 5 TABLET ORAL at 15:15

## 2024-10-07 RX ADMIN — ACETAMINOPHEN 650 MG: 325 TABLET ORAL at 10:56

## 2024-10-07 RX ADMIN — Medication 10 ML: at 10:56

## 2024-10-07 RX ADMIN — Medication 10 ML: at 20:50

## 2024-10-07 RX ADMIN — Medication 10 MG: at 20:50

## 2024-10-07 RX ADMIN — CHLORHEXIDINE GLUCONATE 1 APPLICATION: 500 CLOTH TOPICAL at 03:30

## 2024-10-07 RX ADMIN — SERTRALINE HYDROCHLORIDE 25 MG: 50 TABLET ORAL at 12:17

## 2024-10-07 RX ADMIN — DONEPEZIL HYDROCHLORIDE 10 MG: 10 TABLET, FILM COATED ORAL at 20:50

## 2024-10-07 RX ADMIN — SODIUM CHLORIDE 1000 MG: 900 INJECTION INTRAVENOUS at 15:02

## 2024-10-07 RX ADMIN — ATORVASTATIN CALCIUM 40 MG: 40 TABLET, FILM COATED ORAL at 12:17

## 2024-10-07 RX ADMIN — PANTOPRAZOLE SODIUM 40 MG: 40 INJECTION, POWDER, FOR SOLUTION INTRAVENOUS at 06:05

## 2024-10-07 RX ADMIN — HYDROMORPHONE HYDROCHLORIDE 0.5 MG: 1 INJECTION, SOLUTION INTRAMUSCULAR; INTRAVENOUS; SUBCUTANEOUS at 06:05

## 2024-10-07 RX ADMIN — CHOLECALCIFEROL TAB 10 MCG (400 UNIT) 800 UNITS: 10 TAB at 12:18

## 2024-10-07 NOTE — PLAN OF CARE
Goal Outcome Evaluation:  Plan of Care Reviewed With: patient           Outcome Evaluation: PT eval complete. Pt in fowlers position, AO to person and time with intermittent confusion with situation awareness. pt reports pain in back, knee and chest and was independent prior to admission. however, the pt reported she was bed bound per RN. Today pt was required Max Ax2 for bed mobility and sitting<>stand today and required frequent verbal cues and tactile cues to maintain sitting balance. Pt reported increased R knee pain with standing which also limited ability and prevented side steps and further ambulation. Pt returned to supine and left in fowlers position, and bed alarm reactivated. pt will benefit from skilled PT services to improve bed mobility, t/f, and decrease fall risk. Recommend SNF when medically stable.      Anticipated Discharge Disposition (PT): skilled nursing facility

## 2024-10-07 NOTE — THERAPY TREATMENT NOTE
Acute Care - Speech Language Pathology   Swallow Treatment Note [facility]     Patient Name: Jennifer Gomes  : 1940  MRN: [id number]  Today's Date: 10/7/2024               Admit Date: 10/5/2024  Pt was sitting upright in bed at SLP arrival. Pt was alert and cooperative at for treatment. Pt tolerated PO trials of soft to chew (cracker) and thin liquid (apple juice) consistencies without s/s of aspiration during treatment. RN reported she did choke on possibly her bread from breakfast this morning. Pt reported she may have taken a a bigger bite than she should have of the toast. During PO trials, pt did not report or show s/s of pain when swallowing. Pt will continue soft to chew/thin diet at this time. SLP will follow up and continue treatment.    Wade Mcclellan, Speech Therapy Student 10/7/2024 09:50 CDT      Visit Dx:     ICD-10-CM ICD-9-CM   1. Sepsis without acute organ dysfunction, due to unspecified organism  A41.9 038.9     995.91   2. Acute cystitis without hematuria  N30.00 595.0   3. Gastroenteritis  K52.9 558.9   4. Acute metabolic encephalopathy  G93.41 348.31   5. Acute respiratory failure with hypoxia  J96.01 518.81   6. Dysphagia, unspecified type [R13.10]  R13.10 787.20     Patient Active Problem List   Diagnosis    Gastroesophageal reflux disease    Spinal stenosis, lumbar region, without neurogenic claudication    Overweight    Non-smoker    Erosion of vaginal mesh    Adenocarcinoma of endometrium    Encounter for consultation    S/P hysterectomy with oophorectomy    Encounter for follow-up surveillance of endometrial cancer    History of radiation therapy    Obesity (BMI 30-39.9)    Non-traumatic rhabdomyolysis    Metabolic encephalopathy    Acute renal failure superimposed on stage 3 chronic kidney disease    Acute respiratory failure with hypoxia and hypercapnia    Medical non-compliance, does not take narcotics as prescribed    SIRS (systemic inflammatory response syndrome)    Chronic  constipation    Chronic pain syndrome    Chronic prescription opiate use    Anemia    Hypothyroidism (acquired)    Essential hypertension    TOMÁS (acute kidney injury)    Venous insufficiency of both lower extremities    Fluid retention    Low blood pressure reading    Obesity, Class III, BMI 40-49.9 (morbid obesity)    Chronic intractable headache    RAFAL (obstructive sleep apnea)    Change in bowel habits    Acute encephalopathy due to UTI    Toxic metabolic encephalopathy    Polypharmacy    Frequent falls    Grade III internal hemorrhoids    External hemorrhoids    E. coli UTI    Colitis    Moderate malnutrition    Transaminitis    Acute cholecystitis    Acute UTI (urinary tract infection)    Polypharmacy    Altered mental status    UTI (urinary tract infection)    Bacteremia due to Enterococcus    Dementia    Hypokalemia    Sepsis    Sepsis due to urinary tract infection     Past Medical History:   Diagnosis Date    Anxiety     Arthritis     Bronchitis     Cancer     uterine    Chronic pain     Depression     Disease of thyroid gland     Fibromyalgia     GERD (gastroesophageal reflux disease)     Headache     History of transfusion     AS     Hyperlipidemia     Hypertension     Incontinence     Insomnia     Leg pain     Lumbar stenosis     Migraines     Peptic ulcer     Restless legs     Sleep apnea     NO C-PAP    UTI (urinary tract infection)     Vaginal bleeding      Past Surgical History:   Procedure Laterality Date    BLADDER REPAIR      MESH HAD TO BE REMOVED IN 2013    BREAST BIOPSY Right 2017    benign    BREAST CYST EXCISION Left     CARDIAC CATHETERIZATION      CARPAL TUNNEL RELEASE      CATARACT EXTRACTION W/ INTRAOCULAR LENS  IMPLANT, BILATERAL      CHOLECYSTECTOMY WITH INTRAOPERATIVE CHOLANGIOGRAM N/A 2023    Procedure: CHOLECYSTECTOMY LAPAROSCOPIC INTRAOPERATIVE CHOLANGIOGRAM;  Surgeon: Gerardo Arciniega MD;  Location: [facility];  Service: General;  Laterality: N/A;    COLONOSCOPY       COLONOSCOPY N/A 10/01/2021    Procedure: COLONOSCOPY WITH ANESTHESIA;  Surgeon: Tom Velasco DO;  Location: [facility] ENDOSCOPY;  Service: Gastroenterology;  Laterality: N/A;  pre: change in bowel habits  post: diverticulosis. hemorrhoids.   Olivia Mora APRN        CYSTECTOMY      D & C HYSTEROSCOPY N/A 11/06/2017    Procedure: DILATATION AND CURETTAGE HYSTEROSCOPY;  Surgeon: Shasta Madrigal MD;  Location: [facility] OR;  Service:     DILATION AND CURETTAGE, DIAGNOSTIC / THERAPEUTIC  2008    ENDOSCOPY  09/23/2010    Short segment of Arriola's,Moderate chroninc esophagogastritis and negative H.pylori    ENDOSCOPY N/A 09/25/2017    Procedure: ESOPHAGOGASTRODUODENOSCOPY WITH ANESTHESIA;  Surgeon: Tom Velasco DO;  Location: [facility] ENDOSCOPY;  Service:     EYE SURGERY      RETINA    HEMORRHOIDECTOMY SIGMOIDOSCOPY N/A 3/21/2023    Procedure: HEMORRHOIDECTOMY WITH EXAM UNDER ANESTHESIA;  Surgeon: Holly Chavez MD;  Location: [facility] OR;  Service: General;  Laterality: N/A;    HYSTERECTOMY  12/20/2017    ORIF TIBIA/FIBULA FRACTURES Left 2000    TRANSVAGINAL TAPING SUSPENSION N/A 11/06/2017    Procedure: VAGINAL MESH REVISION;  Surgeon: Shasta Madrigal MD;  Location: [facility] OR;  Service:     VAGINAL MESH REVISION  2013       SLP Recommendation and Plan                                               Daily Summary of Progress (SLP): (P) progress toward functional goals is good (10/07/24 0900)               Treatment Assessment (SLP): (P) improved (10/07/24 0900)     Plan for Continued Treatment (SLP): (P) continue treatment per plan of care (10/07/24 0900)         Plan of Care Reviewed With: (P) patient  Progress: (P) improving  Outcome Evaluation: (P) see note.      SWALLOW EVALUATION (Last 72 Hours)       SLP Adult Swallow Evaluation       Row Name 10/07/24 0900 10/06/24 6510                Rehab Evaluation    Document Type -- evaluation  -JR       Subjective Information -- complains of;pain  -JR        Patient Observations -- alert;cooperative  -JR       Patient/Family/Caregiver Comments/Observations -- no family present  -JR       Patient Effort -- good  -JR       Symptoms Noted During/After Treatment -- none  -JR          General Information    Patient Profile Reviewed -- yes  -JR       Pertinent History Of Current Problem -- Pt presented to ED d/t AMS. Hx of HTN, GERD, CKD stage III, chronic pain. Dx of sepsis d/t UTI. ST consulted after failed post extubation dysphagia screen.  -JR       Current Method of Nutrition -- nasogastric feedings  -JR       Precautions/Limitations, Vision -- WFL;for purposes of eval  -JR       Precautions/Limitations, Hearing -- WFL;for purposes of eval  -JR       Prior Level of Function-Communication -- unknown  -JR       Prior Level of Function-Swallowing -- no diet consistency restrictions  -JR       Plans/Goals Discussed with -- patient;other (see comments)  ELBA Olsen  -       Patient's Goals for Discharge -- return to PO diet  -JR       Family Goals for Discharge -- family did not state  -JR          Pain    Additional Documentation -- Pain Scale: FACES Pre/Post-Treatment (Group)  -JR          Pain Scale: FACES Pre/Post-Treatment    Pain: FACES Scale, Pretreatment -- 2-->hurts little bit  -JR       Posttreatment Pain Rating -- 4-->hurts little more  -JR       Pain Location - Side/Orientation -- Bilateral  -JR       Pain Location -- lower  -JR       Pain Location - -- knee  -JR       Pre/Posttreatment Pain Comment -- pt repositioned  -JR          Oral Motor Structure and Function    Dentition Assessment -- natural, present and adequate  -JR       Secretion Management -- WNL/WFL  -JR       Mucosal Quality -- dry  -JR       Gag Response -- WFL  -JR       Volitional Swallow -- weak  -JR       Volitional Cough -- WFL  -JR          Oral Musculature and Cranial Nerve Assessment    Oral Motor General Assessment -- generalized oral motor weakness  -JR          General Eating/Swallowing  Observations    Respiratory Support Currently in Use -- nasal cannula  -JR       O2 Liters -- 3L  -JR       Eating/Swallowing Skills -- fed by SLP  -JR       Positioning During Eating -- upright in bed  -JR       Utensils Used -- spoon;straw  -       Consistencies Trialed -- pureed;thin liquids;regular textures  -          Clinical Swallow Eval    Oral Prep Phase -- impaired  -JR       Oral Transit -- impaired  -       Oral Residue -- WFL  -       Pharyngeal Phase -- no overt signs/symptoms of pharyngeal impairment  -JR       Esophageal Phase -- unremarkable  -JR          Oral Prep Concerns    Oral Prep Concerns -- poor rotary chew  -JR       Poor Rotary Chew -- regular consistencies  -          Oral Transit Concerns    Oral Transit Concerns -- delayed initiation of bolus transit  -JR       Delayed Intiation of Bolus Transit -- all consistencies  -          SLP Evaluation Clinical Impression    SLP Swallowing Diagnosis -- oral dysphagia;R/O pharyngeal dysphagia  -       Functional Impact -- risk of aspiration/pneumonia  -       Rehab Potential/Prognosis, Swallowing -- good, to achieve stated therapy goals  -       Swallow Criteria for Skilled Therapeutic Interventions Met -- demonstrates skilled criteria  -JR          SLP Treatment Clinical Impressions    Treatment Assessment (SLP) improved (P)   -KG --       Daily Summary of Progress (SLP) progress toward functional goals is good (P)   -KG --       Barriers to Overall Progress (SLP) Medically complex (P)   -KG --       Plan for Continued Treatment (SLP) continue treatment per plan of care (P)   -KG --       Care Plan Review evaluation/treatment results reviewed;care plan/treatment goals reviewed;risks/benefits reviewed;current/potential barriers reviewed;patient/other agree to care plan (P)   -KG --          Recommendations    Therapy Frequency (Swallow) -- at least;3 days per week  -       Predicted Duration Therapy Intervention (Days) -- 1  week  -JR       SLP Diet Recommendation -- soft to chew textures;chopped;thin liquids  -JR       Recommended Diagnostics -- reassess via clinical swallow evaluation  -JR       Recommended Precautions and Strategies -- upright posture during/after eating;small bites of food and sips of liquid;alternate between small bites of food and sips of liquid;general aspiration precautions;reflux precautions  -JR       Oral Care Recommendations -- Oral Care BID/PRN  -JR       SLP Rec. for Method of Medication Administration -- meds whole;with puree;as tolerated  -JR       Monitor for Signs of Aspiration -- yes;notify SLP if any concerns  -JR          Swallow Goals (SLP)    Swallow LTGs Swallow Long Term Goal (free text) (P)   -KG Swallow Long Term Goal (free text)  -JR       Swallow STGs diet tolerance goal selection (SLP) (P)   -KG diet tolerance goal selection (SLP)  -JR       Diet Tolerance Goal Selection (SLP) Patient will tolerate trials of (P)   -KG Patient will tolerate trials of  -JR          (LTG) Swallow    (LTG) Swallow Pt will tolerate LRD with no overt s/s of aspiration. (P)   -KG Pt will tolerate LRD with no overt s/s of aspiration.  -JR       [place] (Swallow Long Term Goal) independently (over 90% accuracy) (P)   -KG independently (over 90% accuracy)  -JR       Time Frame (Swallow Long Term Goal) 1 week (P)   -KG 1 week  -JR       Barriers (Swallow Long Term Goal) medically complex (P)   -KG --       Progress/Outcomes (Swallow Long Term Goal) continuing progress toward goal (P)   -KG new goal  -JR       Comment (Swallow Long Term Goal) see note (P)   -KG --          (STG) Patient will tolerate trials of    Consistencies Trialed (Tolerate trials) soft to chew (chopped) textures;soft to chew (whole) textures;regular textures;thin liquids (P)   -KG soft to chew (chopped) textures;soft to chew (whole) textures;regular textures;thin liquids  -JR       Desired Outcome (Tolerate trials) without signs/symptoms of  aspiration (P)   -KG without signs/symptoms of aspiration  -JR       [place] (Tolerate trials) independently (over 90% accuracy) (P)   -KG independently (over 90% accuracy)  -JR       Progress/Outcomes (Tolerate trials) continuing progress toward goal (P)   -KG new goal  -JR       Comment (Tolerate trials) see note (P)   -KG --                 User Key  (r) = Recorded By, (t) = Taken By, (c) = Cosigned By      Initials Name Effective Dates    JR Wendy Sosa, MS CCC-SLP 08/22/23 -     KG Wade Mcclellan, Speech Therapy Student 08/12/24 -                     EDUCATION  The patient has been educated in the following areas:   Dysphagia (Swallowing Impairment).        SLP GOALS       Row Name 10/07/24 0900 10/06/24 1509          (LTG) Swallow    (LTG) Swallow Pt will tolerate LRD with no overt s/s of aspiration. (P)   -KG Pt will tolerate LRD with no overt s/s of aspiration.  -JR     [place] (Swallow Long Term Goal) independently (over 90% accuracy) (P)   -KG independently (over 90% accuracy)  -JR     Time Frame (Swallow Long Term Goal) 1 week (P)   -KG 1 week  -JR     Barriers (Swallow Long Term Goal) medically complex (P)   -KG --     Progress/Outcomes (Swallow Long Term Goal) continuing progress toward goal (P)   -KG new goal  -JR     Comment (Swallow Long Term Goal) see note (P)   -KG --        (STG) Patient will tolerate trials of    Consistencies Trialed (Tolerate trials) soft to chew (chopped) textures;soft to chew (whole) textures;regular textures;thin liquids (P)   -KG soft to chew (chopped) textures;soft to chew (whole) textures;regular textures;thin liquids  -JR     Desired Outcome (Tolerate trials) without signs/symptoms of aspiration (P)   -KG without signs/symptoms of aspiration  -JR     [place] (Tolerate trials) independently (over 90% accuracy) (P)   -KG independently (over 90% accuracy)  -JR     Progress/Outcomes (Tolerate trials) continuing progress toward goal (P)   -KG new goal  -JR      Comment (Tolerate trials) see note (P)   -KG --               User Key  (r) = Recorded By, (t) = Taken By, (c) = Cosigned By      Initials Name Provider Type    Wendy Jones, MS CCC-SLP Speech and Language Pathologist    Wade Casillas, Speech Therapy Student SLP Student                         Time Calculation:    Time Calculation- SLP       Row Name 10/07/24 0949             Time Calculation- SLP    SLP Start Time 0900 (P)   -KG      SLP Stop Time 0949 (P)   -KG      SLP Time Calculation (min) 49 min (P)   -KG      SLP Received On 10/07/24 (P)   -KG         Untimed Charges    [id number] Treatment Swallow Minutes 49 (P)   -KG         Total Minutes    Untimed Charges Total Minutes 49 (P)   -KG       Total Minutes 49 (P)   -KG                User Key  (r) = Recorded By, (t) = Taken By, (c) = Cosigned By      Initials Name Provider Type    Wade Casillas, Speech Therapy Student SLP Student                             Wade Mcclellan Speech Therapy Student  10/7/2024

## 2024-10-07 NOTE — PROGRESS NOTES
[facility] Intensivist Services  Progress Note    Patient Name: Jennifer Gomes  Date of Admission: 10/5/2024  Today's Date: 10/07/24  Length of Stay: 2  Primary Care Physician: Provider, No Known    Subjective     Chief Complaint: Postcardiopulmonary arrest    HPI:  No acute events overnight, patient resting in bed breathing comfortably on 2 L with a sat 100%.  She had just worked with PT/OT and she was sleeping comfortably.  She was seen this afternoon sitting up in bed eating lunch visiting with family.  Patient stated that she would like to be a full code at this time.    Review of Systems:  All pertinent negatives and positives are as above. All other systems have been reviewed and are negative unless otherwise stated.     ICU Course     Summary:  Patient presented to the ED on 10/5/2024 for deteriorating mental status.  After arriving back to her ER room from CT the patient had a cardiopulmonary arrest and underwent 1 round of ACLS with 1 dose of epinephrine given.  She was admitted to the ICU.  Workup significant for bacteremia with E. coli suspected from urinary source.  She is currently being treated with Rocephin.  She was successfully extubated on 10/6/2024.    Objective      Physical Exam:  General: Well appearing, in no respiratory distress  Head: Normocephalic, atraumatic  Eyes: Conjunctiva clear, sclera non-icteric  Teeth/Gums: Normal dentition  Neck: Supple without stridor  Heart: Regular rate and rhythm, no murmur  Lungs: Clear to auscultation bilaterally, no wheezing  Abdomen: Soft, nontender  MSK/extremities: No cyanosis or edema  Neurologic: AOx3, grossly no focal deficits      Results Review:  Results from last 7 days   Lab Units 10/07/24  0220 10/06/24  0240 10/05/24  0927   SODIUM mmol/L 137 134* 136   POTASSIUM mmol/L 3.6 3.4* 3.5   CHLORIDE mmol/L 107 101 102   CO2 mmol/L 21.0* 22.0 23.0   BUN mg/dL 43* 44* 45*   CALCIUM mg/dL 8.2* 8.0* 8.5*     Results from last 7 days   Lab  "Units 10/07/24  1234 10/07/24  0220 10/06/24  1334 10/06/24  0240 10/05/24  0927   WBC 10*3/mm3  --  12.52*  --  16.64* 25.39*   HEMOGLOBIN g/dL 7.4* 7.2* 7.8* 6.3* 7.1*   HEMATOCRIT % 23.8* 23.2* 24.6* 19.2* 23.3*   PLATELETS 10*3/mm3  --  127*  --  140 171     Visit Vitals  /51   Pulse 82   Temp 100.4 °F (38 °C) (Bladder)   Resp 18   Ht 157.5 cm (62\")   Wt 79.6 kg (175 lb 6.4 oz)   LMP  (LMP Unknown)   SpO2 99%   BMI 32.08 kg/m²       Medications:  atorvastatin, 40 mg, Oral, Daily  carvedilol, 12.5 mg, Oral, Q12H  cefTRIAXone, 1,000 mg, Intravenous, Q24H  Chlorhexidine Gluconate Cloth, 1 Application, Topical, Q24H  cholecalciferol, 800 Units, Oral, Daily  donepezil, 10 mg, Oral, Nightly  levothyroxine, 50 mcg, Oral, Q AM  mupirocin, 1 application , Each Nare, BID  sertraline, 25 mg, Oral, Daily  sodium chloride, 10 mL, Intravenous, Q12H             Assessment/Plan     Problems:  Postcardiopulmonary arrest  E. coli bacteremia  UTI  Sepsis  Acute renal failure  Hypertension  Anemia requiring transfusion  Hyponatremia  History of PUD     Plan:  E. coli bacteremia, UTI  -Blood cultures positive x 2 for E. coli which is sensitive to Rocephin  -De-escalate cefepime to Rocephin, daily 2 g daily for total of 14 days of antibiotics  -Repeat blood cultures today  -Suspect urine is the source, though her urine culture is growing Morganella  -CT abdomen and pelvis shows right and left hydronephrosis with ureteral dilation.  Given abnormal CT and history of recurrent UTIs now with bacteremia we will ask urology to evaluate  -Previous cultures  have grown E. coli and Enterococcus  -Remove Smith, bladder scan 1 hour after to ensure she is not retaining    Postcardiopulmonary arrest  -Continue supplemental oxygen as needed and wean as tolerated, goal O2 sat of 92% and above  -Extubated on 10/6/2024 and doing well on room air today  -Likely secondary to above  -Mental status improved, alert and oriented " today    Sepsis  -Continue Rocephin for E. coli based on cultures  -Repeat blood culture sent today    TOMÁS  -Creatinine is improving, continue oral hydration  -Follow-up metabolic panels    Anemia requiring transfusion  -Patient has a history of iron deficiency anemia  -No signs or symptoms of bleeding, H&H is stable this afternoon  -Restart subcu heparin for DVT prophylaxis      Critical Care Set:  Feeding: Oral intake  Analgesia: Home meds restarted  Sedation: N/A  Thromboprophylaxis: Subcu heparin  HOB: 35+  Ulcer prophylaxis: N/A  Glycemic control: Nondiabetic, at goal  SBT: N/A  Bowel movement: Regimen ordered  Catheter removal: Remove Smith today  Medication de-escalation: De-escalate cefepime to Rocephin    Disposition  Downgrade to floor today      Reuben Abernathy DO  Pulmonary Critical Care Medicine  10/7/2024  13:35 CDT

## 2024-10-07 NOTE — CONSULTS
"Referring Provider: Michela  Reason for Consultation: Recurrent UTI, hydronephrosis    Chief complaint: \"UTI\"    Subjective     History of present illness:    Jennifer Gomes is an 84 year old female  admitted for mental status changes presumably secondary to UTI. She reports problems with UTIs over the last 8 months or so. She denies seeing a urologist in the past. She does mention that she's had infected urine in the past. She denies gross hematuria.     Nursing staff has just removed her Smith.     I have reviewed the images and reports of her last few CT abd/pel from our facility. Her admission CT shows distended bladder with bilateral hydroureteronephrosis. She has hydronephrosis and prominent collecting systems/ureters on previous studies.     Review of Systems  Pertinent items are noted in HPI, all other systems reviewed and negative     History  Past Medical History:   Diagnosis Date    Anxiety     Arthritis     Bronchitis     Cancer     uterine    Chronic pain     Depression     Disease of thyroid gland     Fibromyalgia     GERD (gastroesophageal reflux disease)     Headache     History of transfusion     AS     Hyperlipidemia     Hypertension     Incontinence     Insomnia     Leg pain     Lumbar stenosis     Migraines     Peptic ulcer     Restless legs     Sleep apnea     NO C-PAP    UTI (urinary tract infection)     Vaginal bleeding        Past Surgical History:   Procedure Laterality Date    BLADDER REPAIR      MESH HAD TO BE REMOVED IN 2013    BREAST BIOPSY Right 2017    benign    BREAST CYST EXCISION Left     CARDIAC CATHETERIZATION      CARPAL TUNNEL RELEASE      CATARACT EXTRACTION W/ INTRAOCULAR LENS  IMPLANT, BILATERAL      CHOLECYSTECTOMY WITH INTRAOPERATIVE CHOLANGIOGRAM N/A 2023    Procedure: CHOLECYSTECTOMY LAPAROSCOPIC INTRAOPERATIVE CHOLANGIOGRAM;  Surgeon: Gerardo Arciniega MD;  Location: [facility];  Service: General;  Laterality: N/A;    COLONOSCOPY      COLONOSCOPY N/A " 10/01/2021    Procedure: COLONOSCOPY WITH ANESTHESIA;  Surgeon: Tom Velasco DO;  Location: [facility] ENDOSCOPY;  Service: Gastroenterology;  Laterality: N/A;  pre: change in bowel habits  post: diverticulosis. hemorrhoids.   Olivia Mora APRN        CYSTECTOMY      D & C HYSTEROSCOPY N/A 11/06/2017    Procedure: DILATATION AND CURETTAGE HYSTEROSCOPY;  Surgeon: Shasta Madrigal MD;  Location: [facility] OR;  Service:     DILATION AND CURETTAGE, DIAGNOSTIC / THERAPEUTIC  2008    ENDOSCOPY  09/23/2010    Short segment of Arriola's,Moderate chroninc esophagogastritis and negative H.pylori    ENDOSCOPY N/A 09/25/2017    Procedure: ESOPHAGOGASTRODUODENOSCOPY WITH ANESTHESIA;  Surgeon: Tom Velasco DO;  Location: [facility] ENDOSCOPY;  Service:     EYE SURGERY      RETINA    HEMORRHOIDECTOMY SIGMOIDOSCOPY N/A 3/21/2023    Procedure: HEMORRHOIDECTOMY WITH EXAM UNDER ANESTHESIA;  Surgeon: Holly Chavez MD;  Location:  [facility] OR;  Service: General;  Laterality: N/A;    HYSTERECTOMY  12/20/2017    ORIF TIBIA/FIBULA FRACTURES Left 2000    TRANSVAGINAL TAPING SUSPENSION N/A 11/06/2017    Procedure: VAGINAL MESH REVISION;  Surgeon: Shasta Madrigal MD;  Location:  [facility] OR;  Service:     VAGINAL MESH REVISION  2013       Family History   Problem Relation Age of Onset    Diabetes Mother     Multiple myeloma Mother     Stroke Father     Diabetes Sister     Prostate cancer Brother     Lymphoma Brother         NHL    Ovarian cancer Paternal Aunt     Cancer Paternal Grandmother         metastatic    Lung cancer Paternal Grandfather     Colon cancer Neg Hx     Esophageal cancer Neg Hx     Breast cancer Neg Hx        Social History     Socioeconomic History    Marital status:    Tobacco Use    Smoking status: Never    Smokeless tobacco: Never   Vaping Use    Vaping status: Never Used   Substance and Sexual Activity    Alcohol use: Not Currently     Comment: occasional    Drug use: No    Sexual activity: Defer        Current Facility-Administered Medications   Medication Dose Route Frequency Provider Last Rate Last Admin    acetaminophen (TYLENOL) tablet 650 mg  650 mg Oral Q4H PRN Cuba Espitia MD   650 mg at 10/07/24 1056    Or    acetaminophen (TYLENOL) 160 MG/5ML oral solution 650 mg  650 mg Oral Q4H PRN Cuba Espitia MD   650 mg at 10/06/24 1232    Or    acetaminophen (TYLENOL) suppository 650 mg  650 mg Rectal Q4H PRN Cuba Espitia MD        atorvastatin (LIPITOR) tablet 40 mg  40 mg Oral Daily Quentin Abernathy DO   40 mg at 10/07/24 1217    sennosides-docusate (PERICOLACE) 8.6-50 MG per tablet 2 tablet  2 tablet Oral BID PRN Cuba Espitia MD   2 tablet at 10/07/24 1505    And    bisacodyl (DULCOLAX) EC tablet 5 mg  5 mg Oral Daily PRN Cuba Espitia MD        And    bisacodyl (DULCOLAX) suppository 10 mg  10 mg Rectal Daily PRN Cuba Espitia MD        carvedilol (COREG) tablet 12.5 mg  12.5 mg Oral Q12H Zeinab Trujillo MD   12.5 mg at 10/07/24 1020    [START ON 10/8/2024] cefTRIAXone (ROCEPHIN) 2,000 mg in sodium chloride 0.9 % 100 mL MBP  2,000 mg Intravenous Q24H Quentin Abernathy DO        Chlorhexidine Gluconate Cloth 2 % pads 1 Application  1 Application Topical Q24H Zeinab Trujillo MD   1 Application at 10/07/24 0330    cholecalciferol (VITAMIN D3) tablet 800 Units  800 Units Oral Daily Quentin Abernathy DO   800 Units at 10/07/24 1218    donepezil (ARICEPT) tablet 10 mg  10 mg Oral Nightly Quentin Abernathy DO        heparin (porcine) 5000 UNIT/ML injection 5,000 Units  5,000 Units Subcutaneous Q8H Quentin Abernathy DO   5,000 Units at 10/07/24 1515    levothyroxine (SYNTHROID, LEVOTHROID) tablet 50 mcg  50 mcg Oral Q AM Quentin Abernathy DO   50 mcg at 10/07/24 1217    mupirocin (BACTROBAN) 2 % nasal ointment 1 Application  1 application  Each Nare BID Zeinab Trujillo MD   1 Application at 10/07/24 1020    oxyCODONE  (ROXICODONE) immediate release tablet 10 mg  10 mg Oral Q4H PRN Quentin Abernathy, DO   10 mg at 10/07/24 1515    sertraline (ZOLOFT) tablet 25 mg  25 mg Oral Daily Quentin Abernathy, DO   25 mg at 10/07/24 1217    sodium chloride 0.9 % flush 10 mL  10 mL Intravenous PRN Ta Castillo MD        sodium chloride 0.9 % flush 10 mL  10 mL Intravenous Q12H Cuba Espitia MD   10 mL at 10/07/24 1056    sodium chloride 0.9 % flush 10 mL  10 mL Intravenous PRN Cuba Espitia MD            Allergies   Allergen Reactions    Ropinirole Hcl Shortness Of Breath    Codeine Itching and Mental Status Change    Definity [Perflutren Lipid Microsphere] Other (See Comments)     Severe back pain    Ambien [Zolpidem] Other (See Comments)     HYPER     Eszopiclone Other (See Comments)     MAKES PT HYPER     Pregabalin Dizziness    Tizanidine Other (See Comments)     Terrible nightmares       Objective     Vital Signs   Temp:  [98.7 °F (37.1 °C)-100.4 °F (38 °C)] 100.4 °F (38 °C)  Heart Rate:  [] 76  Resp:  [18-20] 18  BP: (118-153)/(46-76) 120/55    Intake/Output Summary (Last 24 hours) at 10/7/2024 1539  Last data filed at 10/7/2024 1502  Gross per 24 hour   Intake 3485.92 ml   Output 1575 ml   Net 1910.92 ml       Physical Exam:    General: Alert, cooperative, nontoxic, comfortable  Head:  Normocephalic, without obvious abnormality, atraumatic  Eyes:   Lids and lashes normal, no icterus, no pallor  Ears:  Ears appear intact with no abnormalities noted  Neck:  Supple  Back:  No costovertebral angle tenderness  Lungs: Unlabored respirations  Heart:  Regular rhythm and normal rate  Abdomen: Soft, nontender, nondistended  :  Deferred.   Extremities: Moves all extremities well  Skin:  Warm and dry  Neurologic: Cranial nerves 2 - 12 grossly intact    Data Review:    CT Abdomen Pelvis Without Contrast (10/05/2024 04:58)    CT Abdomen Pelvis With Contrast (11/05/2023 17:40)    CT Abdomen Pelvis Without Contrast  (09/04/2023 18:43)    CT Abdomen Pelvis With Contrast (02/08/2022 23:57)    CBC (No Diff) (10/07/2024 02:20)    Renal Function Panel (10/07/2024 02:20)    Blood Culture - Blood, Arm, Right (10/05/2024 02:01)    Urine Culture - Urine, Urine, Catheter (10/05/2024 02:08)    Assessment and Plan:    Bilateral hydronephrosis: Consider incomplete bladder emptying causing upstream dilation. Smith was removed by nursing earlier today. Will have prompted voids Q6H followed by bladder scan for residual to determine bladder emptying. Start with that and then determine repeat imaging. Recommend bowel management to remove rectal stool as potential outlet obstruction. Several CT demonstrated hard appearing stools and/or increased volume of rectal stool.     UTI: Continue broad spectrum antibiotics pending final urine cultures. Will need 14 days total of culture specific antibiotics.    UROLOGICAL DISPOSITION: I will assess the patient again in the morning.     I discussed the patient's findings and my recommendations with patient and nursing staff    (Please note that portions of this note were completed with a voice recognition program.)    Moiz Gallegos MD  10/07/24  15:39 CDT    Time: Time spent: 40 minutes spent performing evaluation and management, chart review, and discussion with patient, > 50% of time spent in face-to-face encounter

## 2024-10-07 NOTE — PLAN OF CARE
Problem: Adult Inpatient Plan of Care  Goal: Plan of Care Review  Outcome: Progressing  Flowsheets (Taken 10/7/2024 0941)  Progress: improving (Pended)  Plan of Care Reviewed With: patient (Pended)  Outcome Evaluation: see note. (Pended)   Goal Outcome Evaluation:  Plan of Care Reviewed With: (P) patient        Progress: (P) improving  Outcome Evaluation: (P) see note.                    Treatment Assessment (SLP): (P) improved (10/07/24 0900)     Plan for Continued Treatment (SLP): (P) continue treatment per plan of care (10/07/24 0900)

## 2024-10-07 NOTE — CONSULTS
Palliative Care Services    Palliative Care Initial Consult   Attending Physician: Zeinab Trujillo MD  Referring Provider: MICAELA Liao    Reason for Referral: assistance with clarification of goals of care  Family/Support: daughter, spouse    Code Status and Medical Interventions: No CPR (Do Not Attempt to Resuscitate); Full Support   Ordered at: 10/05/24 0922     Level Of Support Discussed With:    Next of Kin (If No Surrogate)     Code Status (Patient has no pulse and is not breathing):    No CPR (Do Not Attempt to Resuscitate)     Medical Interventions (Patient has pulse or is breathing):    Full Support     Goals of Care: TBD.    HPI:   84 y.o. 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 Garcia, Depression, hypothyroidism, Fibromyalgia, GERD, Hyperlipidemia, Hypertension, Incontinence, Insomnia, restless leg syndrome, obstructive sleep apnea-not using CPAP, Lumbar stenosis, Migraines, and Peptic ulcer. Patient presented to  on 10/5/2024 related to altered mental status with weakness.  According to chart review has been having episodes of incontinence of urine and stool 1 week prior to presentation.  ED workup shows elevated troponin, creatinine 2.13, hypoalbuminemia, leukocytosis, anemia. Blood cultures positive for E. coli.  Urine culture positive for Morganella.  Apparently patient admitted as DNR/DO NOT INTUBATE.  Transferred to CT and experience cardiopulmonary arrest and a CODE BLUE was called and received CPR x 5-10 minutes per report.  Placed in code chill protocol at 0501.  Admitted to critical care unit on ventilator support with sepsis secondary to UTI.  Started on IV fluids and IV antibiotics.  Hypothermia protocol discontinued.  Acute kidney injury felt likely  multifactorial.  CT abdomen pelvis showed moderately distended urinary bladder with bilateral hydronephrosis and ureteral dilation compatible with vesicoureteral reflux.  Indeterminant small amount of air within right renal collecting system and upper right ureter.  Constipation.  Developed significant anemia with hemoglobin 6.3 requiring unit of PRBC 10/6.  Extubated to nasal cannula 10/6.  Evaluated by speech therapy due to mild oral dysphagia and started on thin liquid soft to chew solid with chopped meat diet.  Therapy recommends SNF.  Patient laying in bed without apparent distress, appropriate and conversant.  No family at bedside.  Palliative Care Spoke With: patient and family report patient is ambulatory with walker.  She does have a history of frequent falls but last documented around 9 months ago and falls stopped after patient discontinued Xanax.  Xanax was felt as a factor of generalized weakness and discontinued by PCP.  Most recently developed altered mental status and weakness secondary to episodes of urinary and stool incontinence over the last 1 week prior to hospitalization.  Daughter states that patient was actually on the toilet and developed altered mental status and family elected to call EMS for assistance.  Patient was brought to the hospital for further diagnostic workup.  Daughter states that she did relay to EMS that patient was a DNR however that information unfortunately did not get relayed to the emergency room department.  Patient unfortunately had cardiopulmonary arrest event and received CPR measures requiring intubation.  Family appreciative for CPR efforts. Due to the Palliative Care Topics Discussed: palliative care, goals of care, care options, resuscitation status, and discharge options we will establish an advance care plan.   Advance Care Planning   Advance Care Planning Discussion: Patient agreeable to conversation, later spoke with daughter Ivonne via telephone with regard  to events leading to current hospitalization and overall guarded long-term prognosis secondary to multiple comorbid factors and cardiopulmonary arrest event this hospitalization.  We explored medical priorities including CODE STATUS and medical interventions.  Patient acknowledges she was a DO NOT RESUSCITATE prior to hospitalization, however with her cardiopulmonary arrest event and successful outcome states that she would wish to pursue full aggressive care interventions including CPR at this juncture.  We discussed potential associated risk at length and patient does admit to quite a bit of discomfort across her chest after CPR measures.  States that she feels this hospitalization gave her a little more time to consider options and she feels confident in her decision to pursue full aggressive care interventions at this juncture including CPR.  Patient and family aware should they elect to de-escalate care measures this would be an option.  We further discussed discharge options and daughter states she has had the opportunity to speak with her mother and they have elected for discharge home with home health, not palliative care.  Aware they would need to have a PCP for home health and Ivonne is able to relay her mother was unfortunately discharged recently as she was unable to make it to a scheduled appointment on 9/19 due to illness and did not feel that she needed to be seen in the ED. She was informed PCP would no longer see her as a result.  She did have an office visit with PCP on 8/15 and prior to that was followed by MORGAN Castro, however Ms. Mora recently retired and patient's care was transitioned to Anna Marie Espana MD. Questions encouraged and support given. Appreciative for conversation.     Review of Systems   Constitutional: Positive for malaise/fatigue.   Respiratory:  Positive for shortness of breath.    Musculoskeletal:  Positive for back pain and myalgias.   Neurological:  Positive for weakness.    Psychiatric/Behavioral:  The patient is not nervous/anxious.      1- Pain Assessment  CPOT Facial Expression: 2-->grimacing  CPOT Body Movements: 1-->protection  CPOT Muscle Tension: 1-->tense, rigid  Ventilator Compliance/Vocalization: 1-->sighing, moaning  CPOT Score: 5    Past Medical History:   Diagnosis Date    Anxiety     Arthritis     Bronchitis     Cancer     uterine    Chronic pain     Depression     Disease of thyroid gland     Fibromyalgia     GERD (gastroesophageal reflux disease)     Headache     History of transfusion     AS     Hyperlipidemia     Hypertension     Incontinence     Insomnia     Leg pain     Lumbar stenosis     Migraines     Peptic ulcer     Restless legs     Sleep apnea     NO C-PAP    UTI (urinary tract infection)     Vaginal bleeding      Past Surgical History:   Procedure Laterality Date    BLADDER REPAIR      MESH HAD TO BE REMOVED IN 2013    BREAST BIOPSY Right 2017    benign    BREAST CYST EXCISION Left     CARDIAC CATHETERIZATION      CARPAL TUNNEL RELEASE      CATARACT EXTRACTION W/ INTRAOCULAR LENS  IMPLANT, BILATERAL      CHOLECYSTECTOMY WITH INTRAOPERATIVE CHOLANGIOGRAM N/A 2023    Procedure: CHOLECYSTECTOMY LAPAROSCOPIC INTRAOPERATIVE CHOLANGIOGRAM;  Surgeon: Gerardo Arciniega MD;  Location: [facility] OR;  Service: General;  Laterality: N/A;    COLONOSCOPY      COLONOSCOPY N/A 10/01/2021    Procedure: COLONOSCOPY WITH ANESTHESIA;  Surgeon: Tom Velasco DO;  Location: [facility] ENDOSCOPY;  Service: Gastroenterology;  Laterality: N/A;  pre: change in bowel habits  post: diverticulosis. hemorrhoids.   Olivia Mora APRN        CYSTECTOMY      D & C HYSTEROSCOPY N/A 2017    Procedure: DILATATION AND CURETTAGE HYSTEROSCOPY;  Surgeon: Shasta Madrigal MD;  Location: [facility] OR;  Service:     DILATION AND CURETTAGE, DIAGNOSTIC / THERAPEUTIC      ENDOSCOPY  2010    Short segment of Arriola's,Moderate chroninc esophagogastritis and negative  H.pylori    ENDOSCOPY N/A 09/25/2017    Procedure: ESOPHAGOGASTRODUODENOSCOPY WITH ANESTHESIA;  Surgeon: Tom Velasco DO;  Location:  [facility] ENDOSCOPY;  Service:     EYE SURGERY      RETINA    HEMORRHOIDECTOMY SIGMOIDOSCOPY N/A 3/21/2023    Procedure: HEMORRHOIDECTOMY WITH EXAM UNDER ANESTHESIA;  Surgeon: Holly Chavez MD;  Location:  [facility] OR;  Service: General;  Laterality: N/A;    HYSTERECTOMY  12/20/2017    ORIF TIBIA/FIBULA FRACTURES Left 2000    TRANSVAGINAL TAPING SUSPENSION N/A 11/06/2017    Procedure: VAGINAL MESH REVISION;  Surgeon: Shasta Madrigal MD;  Location:  [facility] OR;  Service:     VAGINAL MESH REVISION  2013     Social History     Socioeconomic History    Marital status:    Tobacco Use    Smoking status: Never    Smokeless tobacco: Never   Vaping Use    Vaping status: Never Used   Substance and Sexual Activity    Alcohol use: Not Currently     Comment: occasional    Drug use: No    Sexual activity: Defer         Current Facility-Administered Medications:     acetaminophen (TYLENOL) tablet 650 mg, 650 mg, Oral, Q4H PRN, 650 mg at 10/07/24 1056 **OR** acetaminophen (TYLENOL) 160 MG/5ML oral solution 650 mg, 650 mg, Oral, Q4H PRN, 650 mg at 10/06/24 1232 **OR** acetaminophen (TYLENOL) suppository 650 mg, 650 mg, Rectal, Q4H PRN, Cuba Espitia MD    atorvastatin (LIPITOR) tablet 40 mg, 40 mg, Oral, Daily, Quentin Abernathy DO    sennosides-docusate (PERICOLACE) 8.6-50 MG per tablet 2 tablet, 2 tablet, Oral, BID PRN **AND** [DISCONTINUED] polyethylene glycol (MIRALAX) packet 17 g, 17 g, Oral, Daily PRN **AND** bisacodyl (DULCOLAX) EC tablet 5 mg, 5 mg, Oral, Daily PRN **AND** bisacodyl (DULCOLAX) suppository 10 mg, 10 mg, Rectal, Daily PRN, Cuba Espitia MD    carvedilol (COREG) tablet 12.5 mg, 12.5 mg, Oral, Q12H, Zeinab Trujillo MD, 12.5 mg at 10/07/24 1020    cefTRIAXone (ROCEPHIN) 1,000 mg in sodium chloride 0.9 % 100 mL MBP, 1,000 mg, Intravenous, Q24H, Jatin,  Rogers CRAIG PA-C    Chlorhexidine Gluconate Cloth 2 % pads 1 Application, 1 Application, Topical, Q24H, Zeinab Trujillo MD, 1 Application at 10/07/24 0330    cholecalciferol (VITAMIN D3) tablet 800 Units, 800 Units, Oral, Daily, Quentin Abernathy, DO    donepezil (ARICEPT) tablet 10 mg, 10 mg, Oral, Nightly, Quentin Abernathy, DO    levothyroxine (SYNTHROID, LEVOTHROID) tablet 50 mcg, 50 mcg, Oral, Q AM, Quentin Abernathy, DO    mupirocin (BACTROBAN) 2 % nasal ointment 1 Application, 1 application , Each Nare, BID, Zeinab Trujillo MD, 1 Application at 10/07/24 1020    oxyCODONE (ROXICODONE) immediate release tablet 10 mg, 10 mg, Oral, Q4H PRN, Quentin Abernathy, DO    potassium chloride (KLOR-CON) packet 20 mEq, 20 mEq, Oral, Once, Quentin Abernathy, DO    sertraline (ZOLOFT) tablet 25 mg, 25 mg, Oral, Daily, Quentin Abernathy, DO    sodium chloride 0.9 % flush 10 mL, 10 mL, Intravenous, PRN, Ta Castillo MD    sodium chloride 0.9 % flush 10 mL, 10 mL, Intravenous, Q12H, Cuba Espitia MD, 10 mL at 10/07/24 1056    sodium chloride 0.9 % flush 10 mL, 10 mL, Intravenous, PRN, Cuba Espitia MD    Allergies   Allergen Reactions    Ropinirole Hcl Shortness Of Breath    Codeine Itching and Mental Status Change    Definity [Perflutren Lipid Microsphere] Other (See Comments)     Severe back pain    Ambien [Zolpidem] Other (See Comments)     HYPER     Eszopiclone Other (See Comments)     MAKES PT HYPER     Pregabalin Dizziness    Tizanidine Other (See Comments)     Terrible nightmares     I have utilized all available immediate resources to obtain, update, or review the patient's current medications (including all prescriptions, over-the-counter products, herbals, cannabis/cannabidiol products, and vitamin/mineral/dietary (nutritional) supplements) for name, route of administration, type, dose and frequency.      Intake/Output Summary (Last 24 hours) at 10/7/2024  "1135  Last data filed at 10/7/2024 0400  Gross per 24 hour   Intake 3875.37 ml   Output 1175 ml   Net 2700.37 ml       Physical Exam:    Diagnostics: Reviewed  /52   Pulse 87   Temp 99.2 °F (37.3 °C) (Bladder)   Resp 18   Ht 157.5 cm (62\")   Wt 79.6 kg (175 lb 6.4 oz)   LMP  (LMP Unknown)   SpO2 98%   BMI 32.08 kg/m²     Vitals and nursing note reviewed.   Constitutional:       Appearance: Not in distress. Ill-appearing and chronically ill-appearing.      Interventions: Nasal cannula in place.   Eyes:      General: Lids are normal.   HENT:      Head: Normocephalic.   Pulmonary:      Effort: Pulmonary effort is normal.   Cardiovascular:      Normal rate.   Edema:     Peripheral edema absent.   Musculoskeletal: Normal range of motion.      Cervical back: Neck supple. Skin:     General: Skin is warm.   Genitourinary:     Comments: Smith catheter in place  Neurological:      Mental Status: Alert, oriented to person, place, and time and oriented to person, place and time.   Psychiatric:         Mood and Affect: Mood normal.         Cognition and Memory: Cognition normal.       Patient status: Disease state: Controlled with current treatments.  Current Functional status: Palliative Performance Scale Score: Performance 30% based on the following measures: Ambulation: Totally bed bound, Activity and Evidence of Disease: Unable to do any work, extensive evidence of disease, Self-Care: Total care required,  Intake: Reduced, LOC: Full, drowsy or confusion   Baseline Functional status: Palliative Performance Scale Score: Performance 70% based on the following measures: Ambulation: Reduced, Activity and Evidence of Disease: Unable to do normal work, some evidence of disease, Self-Care: Fully independent,  Intake: Normal or reduced, LOC: Full   Baseline ECOG Status(2) Ambulatory and capable of self care, unable to carry out work activity, up and about > 50% or waking hours.  Nutritional status: Albumin 2.3 Body mass " index is 32.08 kg/m².         Hospital Problem List      Sepsis    Sepsis due to urinary tract infection    Impression/Problem List:    Cardiopulmonary arrest  Alzheimer's dementia  Sepsis due to bacteremia-E. 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 Garcia  Depression  Hypothyroidism  Fibromyalgia  GERD  Hyperlipidemia  Hypertension  Incontinence  Restless leg syndrome  Obstructive sleep apnea-not using CPAP  Lumbar stenosis  Migraines  Peptic ulcer   Advanced age    Recommendations/Plan:  1. plan: Goals of care include CODE STATUS CPR/full support interventions.    Family support: The patient receives support from her  and daughter..  Advance Directives: Not on file     POA/Healthcare surrogate-spouse and daughter-next of kin.    2.  Palliative care encounter  - Prognosis is guarded long-term secondary to cardiopulmonary arrest, Alzheimer's dementia, sepsis, acute kidney injury, adenocarcinoma of endometrium, multiple comorbidities, and advanced age.  -Patient and family appears to have fair prognostic awareness.     -Apparently patient admitted as DNR/DO NOT INTUBATE.  Transferred to CT and experience cardiopulmonary arrest and a CODE BLUE was called and received CPR x 5-10 minutes per report.  Placed in code chill protocol at 0501.  Admitted to critical care unit on ventilator support with sepsis.  Started on IV fluids and IV antibiotics.  Hypothermia protocol discontinued.  Acute kidney injury felt likely multifactorial.    10/6-unit of PRBC 10/6.    -Extubated to nasal cannula.    -Evaluated by speech therapy due to mild oral dysphagia and started on thin liquid soft to chew solid with chopped meat diet.    -Therapy recommends SNF.   -Anticipating hospice at discharge per intensivist.     10/7-clarified CODE  STATUS CPR/full support interventions  -Anticipating home with home health.  Will need PCP prior to discharge.  -At risk for rehospitalization's and decline given multiple comorbid factors and cardiopulmonary arrest event      Thank you for this consult and allowing us to participate in patient's plan of care. Palliative Care Team will continue to follow patient.     25 minutes spent on advance care planning-discussing with patient and/or family, answering questions, providing some guidance about a plan and documentation of care, and coordinating care face to face.    Joana Rush, APRN  10/7/2024  11:35 CDT

## 2024-10-07 NOTE — CASE MANAGEMENT/SOCIAL WORK
Discharge Planning Assessment  [facility]     Patient Name: Jennifer Gomes  MRN: [id number]  Today's Date: 10/7/2024    Admit Date: 10/5/2024        Discharge Needs Assessment       Row Name 10/07/24 1147       Living Environment    People in Home child(sebastian), adult;spouse    Name(s) of People in Home Daughter - Ivonne Liang    Current Living Arrangements home    In the past 12 months has the electric, gas, oil, or water company threatened to shut off services in your home? No    Primary Care Provided by spouse/significant other;child(sebastian)    Provides Primary Care For no one, unable/limited ability to care for self    Family Caregiver if Needed child(sebastian), adult    Family Caregiver Names Ivonne Liang - daughter and spouse    Quality of Family Relationships supportive;helpful;involved       Resource/Environmental Concerns    Resource/Environmental Concerns none       Food Insecurity    Within the past 12 months, you worried that your food would run out before you got the money to buy more. Never true    Within the past 12 months, the food you bought just didn't last and you didn't have money to get more. Never true       Transition Planning    Patient/Family Anticipates Transition to home with family    Transportation Anticipated family or friend will provide;health plan transportation       Discharge Needs Assessment    Readmission Within the Last 30 Days no previous admission in last 30 days    Equipment Currently Used at Home walker, rolling;commode    Concerns to be Addressed care coordination/care conferences;discharge planning    Current Discharge Risk chronically ill    Discharge Coordination/Progress BRITTANI spoke with patient's daughter to complete dc planning assessment.  Patient resides at home with her daughter and spouse.  Patient's daughter and spouse provide care to patient.  Daughter advised patient has been ambulatory at home.  Daughter is aware that patient's PCP has discharged her from her practice due  to last appointment.  If patient does not have a PCP, then HH services are not an option.  Daughter inquired about hospice services but SW did explain that hospice does not provide therapy and this is end of life care.  Not sure daughter has complete understanding of what hospice is.  Palliative care consult noted.  Daughter advised they will continue to care for patient at home and is not interested in SNF placement.  Daughter requests we follow up with her at a later date as she would like to speak with her mother about options.                   Discharge Plan    No documentation.                 Continued Care and Services - Admitted Since 10/5/2024    No active coordination exists for this encounter.          Demographic Summary    No documentation.                  Functional Status    No documentation.                  Psychosocial    No documentation.                  Abuse/Neglect    No documentation.                  Legal    No documentation.                  Substance Abuse    No documentation.                  Patient Forms    No documentation.                     JEFRY Wood

## 2024-10-07 NOTE — PLAN OF CARE
Goal Outcome Evaluation:  Plan of Care Reviewed With: patient        Progress: no change  Outcome Evaluation: OT eval completed. Pt in fowlers upon therapist arrival; A&O to person and time; c/o 8/10 buttock and R knee pain. Pt reports Mod I-I with all BADLs including fxl ambulation at baseline, however, Pt inconsisent with reports of PLOF initially. Today, Pt performed supine<>sit utilizing bedrail with HOB elevated with Max A x2 and verbal/visual/tactile cues for sequencing and body mechanics. Pt dependent to kyrie B socks. Pt performed sit<>stand requiring Max A x2 and verbal/visual/tactile cues for sequencing and body mechanics. Pt additionally required Max A x2 to maintain static standing. Pt demos significant BUE weakness and limited fxl activity tolerance. Pt's R knee pain is also greatly inhibiting Pt's fxl mobility. Skilled OT intervention indicated in order to address deficits in fxl mobility, fxl activity tolerance, balance, strength, and use of adaptive techniques/equipment during performance of BADLs. Recommend SNF at discharge.      Anticipated Discharge Disposition (OT): skilled nursing facility

## 2024-10-07 NOTE — THERAPY EVALUATION
Patient Name: Jennifer Gomes  : 1940    MRN: [id number]                              Today's Date: 10/7/2024       Admit Date: 10/5/2024    Visit Dx:     ICD-10-CM ICD-9-CM   1. Sepsis without acute organ dysfunction, due to unspecified organism  A41.9 038.9     995.91   2. Acute cystitis without hematuria  N30.00 595.0   3. Gastroenteritis  K52.9 558.9   4. Acute metabolic encephalopathy  G93.41 348.31   5. Acute respiratory failure with hypoxia  J96.01 518.81   6. Dysphagia, unspecified type [R13.10]  R13.10 787.20     Patient Active Problem List   Diagnosis    Gastroesophageal reflux disease    Spinal stenosis, lumbar region, without neurogenic claudication    Overweight    Non-smoker    Erosion of vaginal mesh    Adenocarcinoma of endometrium    Encounter for consultation    S/P hysterectomy with oophorectomy    Encounter for follow-up surveillance of endometrial cancer    History of radiation therapy    Obesity (BMI 30-39.9)    Non-traumatic rhabdomyolysis    Metabolic encephalopathy    Acute renal failure superimposed on stage 3 chronic kidney disease    Acute respiratory failure with hypoxia and hypercapnia    Medical non-compliance, does not take narcotics as prescribed    SIRS (systemic inflammatory response syndrome)    Chronic constipation    Chronic pain syndrome    Chronic prescription opiate use    Anemia    Hypothyroidism (acquired)    Essential hypertension    TOMÁS (acute kidney injury)    Venous insufficiency of both lower extremities    Fluid retention    Low blood pressure reading    Obesity, Class III, BMI 40-49.9 (morbid obesity)    Chronic intractable headache    RAFAL (obstructive sleep apnea)    Change in bowel habits    Acute encephalopathy due to UTI    Toxic metabolic encephalopathy    Polypharmacy    Frequent falls    Grade III internal hemorrhoids    External hemorrhoids    E. coli UTI    Colitis    Moderate malnutrition    Transaminitis    Acute cholecystitis    Acute UTI (urinary  tract infection)    Polypharmacy    Altered mental status    UTI (urinary tract infection)    Bacteremia due to Enterococcus    Dementia    Hypokalemia    Sepsis    Sepsis due to urinary tract infection     Past Medical History:   Diagnosis Date    Anxiety     Arthritis     Bronchitis     Cancer     uterine    Chronic pain     Depression     Disease of thyroid gland     Fibromyalgia     GERD (gastroesophageal reflux disease)     Headache     History of transfusion     AS     Hyperlipidemia     Hypertension     Incontinence     Insomnia     Leg pain     Lumbar stenosis     Migraines     Peptic ulcer     Restless legs     Sleep apnea     NO C-PAP    UTI (urinary tract infection)     Vaginal bleeding      Past Surgical History:   Procedure Laterality Date    BLADDER REPAIR      MESH HAD TO BE REMOVED IN 2013    BREAST BIOPSY Right 2017    benign    BREAST CYST EXCISION Left     CARDIAC CATHETERIZATION      CARPAL TUNNEL RELEASE      CATARACT EXTRACTION W/ INTRAOCULAR LENS  IMPLANT, BILATERAL      CHOLECYSTECTOMY WITH INTRAOPERATIVE CHOLANGIOGRAM N/A 2023    Procedure: CHOLECYSTECTOMY LAPAROSCOPIC INTRAOPERATIVE CHOLANGIOGRAM;  Surgeon: Gerardo Arciniega MD;  Location: [facility] OR;  Service: General;  Laterality: N/A;    COLONOSCOPY      COLONOSCOPY N/A 10/01/2021    Procedure: COLONOSCOPY WITH ANESTHESIA;  Surgeon: Tom Velasco DO;  Location: [facility] ENDOSCOPY;  Service: Gastroenterology;  Laterality: N/A;  pre: change in bowel habits  post: diverticulosis. hemorrhoids.   Olivia Mora APRN        CYSTECTOMY      D & C HYSTEROSCOPY N/A 2017    Procedure: DILATATION AND CURETTAGE HYSTEROSCOPY;  Surgeon: Shasta Madrigal MD;  Location: [facility] OR;  Service:     DILATION AND CURETTAGE, DIAGNOSTIC / THERAPEUTIC      ENDOSCOPY  2010    Short segment of Arriola's,Moderate chroninc esophagogastritis and negative H.pylori    ENDOSCOPY N/A 2017    Procedure:  ESOPHAGOGASTRODUODENOSCOPY WITH ANESTHESIA;  Surgeon: Tom Velasco DO;  Location: [facility] ENDOSCOPY;  Service:     EYE SURGERY      RETINA    HEMORRHOIDECTOMY SIGMOIDOSCOPY N/A 3/21/2023    Procedure: HEMORRHOIDECTOMY WITH EXAM UNDER ANESTHESIA;  Surgeon: Holly Chavez MD;  Location:  [facility] OR;  Service: General;  Laterality: N/A;    HYSTERECTOMY  12/20/2017    ORIF TIBIA/FIBULA FRACTURES Left 2000    TRANSVAGINAL TAPING SUSPENSION N/A 11/06/2017    Procedure: VAGINAL MESH REVISION;  Surgeon: Shasta Madrigal MD;  Location:  [facility] OR;  Service:     VAGINAL MESH REVISION  2013      General Information       Row Name 10/07/24 1010          OT Time and Intention    Document Type evaluation  cc: AMS with weakness. Dx: Acute renal failure, UTI, sepsis. Code blue, code chill, intubated 10/5. Extubated 10/6  -LS     Mode of Treatment occupational therapy  -LS       Row Name 10/07/24 1010          General Information    Patient Profile Reviewed yes  -LS     Prior Level of Function independent:;ADL's;all household mobility;dependent:;home management;cooking;cleaning;driving;shopping  Per Pt report, however, unsure if this is accurate due to Pt inconsistent with reports of independence level. Utilized rollator during fxl ambulation  -LS     Existing Precautions/Restrictions fall  -LS     Barriers to Rehab medically complex;previous functional deficit  -LS       Row Name 10/07/24 1010          Occupational Profile    Environmental Supports and Barriers (Occupational Profile) Walk in shower with shower chair and grab bars. Other AD/DME: rollator  -LS       Row Name 10/07/24 1010          Living Environment    People in Home spouse;child(sebastian), adult  -LS       Row Name 10/07/24 1010          Home Main Entrance    Number of Stairs, Main Entrance four  -LS     Stair Railings, Main Entrance none  -LS       Row Name 10/07/24 1010          Cognition    Orientation Status (Cognition) oriented to;person;time  -LS       Row  Name 10/07/24 1010          Safety Issues, Functional Mobility    Safety Issues Affecting Function (Mobility) ability to follow commands;awareness of need for assistance;insight into deficits/self-awareness;judgment;problem-solving;safety precaution awareness;safety precautions follow-through/compliance;sequencing abilities  -LS     Impairments Affecting Function (Mobility) balance;cognition;endurance/activity tolerance;pain;strength  -     Cognitive Impairments, Mobility Safety/Performance attention;awareness, need for assistance;insight into deficits/self-awareness;judgment;problem-solving/reasoning;safety precaution awareness;safety precaution follow-through;sequencing abilities  -LS               User Key  (r) = Recorded By, (t) = Taken By, (c) = Cosigned By      Initials Name Provider Type     Faith Purvis OTR/L Occupational Therapist                     Mobility/ADL's       Row Name 10/07/24 1010          Bed Mobility    Bed Mobility supine-sit;sit-supine  -     Supine-Sit [place] (Bed Mobility) maximum assist (25% patient effort);2 person assist;verbal cues;nonverbal cues (demo/gesture)  -     Sit-Supine [place] (Bed Mobility) maximum assist (25% patient effort);2 person assist;verbal cues;nonverbal cues (demo/gesture)  -     Assistive Device (Bed Mobility) bed rails;draw sheet;head of bed elevated  -       Row Name 10/07/24 1010          Transfers    Transfers sit-stand transfer;stand-sit transfer  -       Row Name 10/07/24 1010          Sit-Stand Transfer    Sit-Stand [place] (Transfers) maximum assist (25% patient effort);2 person assist;verbal cues;nonverbal cues (demo/gesture)  -     Assistive Device (Sit-Stand Transfers) other (see comments)  HHA x2  -       Row Name 10/07/24 1010          Stand-Sit Transfer    Stand-Sit [place] (Transfers) maximum assist (25% patient effort);2 person assist;verbal cues;nonverbal cues (demo/gesture)  -     Assistive Device  (Stand-Sit Transfers) other (see comments)  HHA x2  -       Row Name 10/07/24 1010          Activities of Daily Living    BADL Assessment/Intervention upper body dressing;lower body dressing;toileting  -       Row Name 10/07/24 1010          Upper Body Dressing Assessment/Training    [place] Level (Upper Body Dressing) upper body dressing skills;maximum assist (25% patient effort)  -     Position (Upper Body Dressing) edge of bed sitting  -       Row Name 10/07/24 1010          Lower Body Dressing Assessment/Training    [place] Level (Lower Body Dressing) lower body dressing skills;dependent (less than 25% patient effort)  -LS     Position (Lower Body Dressing) supine  -       Row Name 10/07/24 1010          Toileting Assessment/Training    [place] Level (Toileting) toileting skills;dependent (less than 25% patient effort)  -LS     Position (Toileting) supine  -               User Key  (r) = Recorded By, (t) = Taken By, (c) = Cosigned By      Initials Name Provider Type     Faith Purvis OTR/L Occupational Therapist                   Obj/Interventions       Row Name 10/07/24 1010          Sensory Assessment (Somatosensory)    Sensory Assessment (Somatosensory) UE sensation intact  -       Row Name 10/07/24 1010          Vision Assessment/Intervention    Visual Impairment/Limitations [facility]  -       Row Name 10/07/24 1010          Range of Motion Comprehensive    General Range of Motion upper extremity range of motion deficits identified  -     Comment, General Range of Motion B shoulder AROM impaired 50%; BUE AROM/PROM otherwise WFL  -       Row Name 10/07/24 1010          Strength Comprehensive (MMT)    General Manual Muscle Testing (MMT) Assessment upper extremity strength deficits identified  -     Comment, General Manual Muscle Testing (MMT) Assessment B shoulders 3-/5; BUE strength grossly 4/5 at all other joints  -       Row Name 10/07/24 1010          Balance    Balance  Assessment sitting static balance;sitting dynamic balance;standing static balance  -LS     Static Sitting Balance contact guard  -LS     Dynamic Sitting Balance contact guard;minimal assist  -LS     Position, Sitting Balance sitting edge of bed;supported  -LS     Static Standing Balance maximum assist;2-person assist  -LS     Position/Device Used, Standing Balance supported;other (see comments)  HHA x2  -LS               User Key  (r) = Recorded By, (t) = Taken By, (c) = Cosigned By      Initials Name Provider Type     Faith Purvis OTR/L Occupational Therapist                   Goals/Plan       Row Name 10/07/24 1010          Transfer Goal 1 (OT)    Activity/Assistive Device (Transfer Goal 1, OT) commode, bedside without drop arms  -LS     [place] Level/Cues Needed (Transfer Goal 1, OT) maximum assist (25-49% patient effort)  -LS     Time Frame (Transfer Goal 1, OT) long term goal (LTG);10 days  -LS     Progress/Outcome (Transfer Goal 1, OT) new goal  -LS       Row Name 10/07/24 1010          Dressing Goal 1 (OT)    Activity/Device (Dressing Goal 1, OT) upper body dressing  -LS     [place]/Cues Needed (Dressing Goal 1, OT) standby assist  -LS     Time Frame (Dressing Goal 1, OT) long term goal (LTG);10 days  -LS     Strategies/Barriers (Dressing Goal 1, OT) While seated EOB.  -LS     Progress/Outcome (Dressing Goal 1, OT) new goal  -       Row Name 10/07/24 1010          Toileting Goal 1 (OT)    Activity/Device (Toileting Goal 1, OT) toileting skills, all;commode, bedside without drop arms  -LS     [place] Level/Cues Needed (Toileting Goal 1, OT) maximum assist (25-49% patient effort)  -LS     Time Frame (Toileting Goal 1, OT) long term goal (LTG);10 days  -LS     Progress/Outcome (Toileting Goal 1, OT) new goal  -       Row Name 10/07/24 1058 10/07/24 1010       Problem Specific Goal 1 (OT)    Problem Specific Goal 1 (OT) --  -LS Pt will independently implement one pain management technique  to decrease pain and improve functional performance.  -LS    Time Frame (Problem Specific Goal 1, OT) --  -LS long term goal (LTG);by discharge  -LS    Progress/Outcome (Problem Specific Goal 1, OT) --  -LS new goal  -      Row Name 10/07/24 1058 10/07/24 1010       Therapy Assessment/Plan (OT)    Planned Therapy Interventions (OT) --  -LS activity tolerance training;functional balance retraining;occupation/activity based interventions;ROM/therapeutic exercise;adaptive equipment training;BADL retraining;strengthening exercise;transfer/mobility retraining;patient/caregiver education/training;cognitive/visual perception retraining  -              User Key  (r) = Recorded By, (t) = Taken By, (c) = Cosigned By      Initials Name Provider Type    Faith Posadas OTR/L Occupational Therapist                   Clinical Impression       Row Name 10/07/24 1010          Pain Assessment    Pretreatment Pain Rating 8/10  -LS     Posttreatment Pain Rating 8/10  -LS     Pain Location - Side/Orientation Right  -LS     Pain Location - knee;buttock  -LS     Pain Intervention(s) Repositioned;Ambulation/increased activity  -       Row Name 10/07/24 1010          Plan of Care Review    Plan of Care Reviewed With patient  -LS     Progress no change  -LS     Outcome Evaluation OT eval completed. Pt in fowlers upon therapist arrival; A&O to person and time; c/o 8/10 buttock and R knee pain. Pt reports Mod I-I with all BADLs including fxl ambulation at baseline, however, Pt inconsisent with reports of PLOF initially. Today, Pt performed supine<>sit utilizing bedrail with HOB elevated with Max A x2 and verbal/visual/tactile cues for sequencing and body mechanics. Pt dependent to kyrie B socks. Pt performed sit<>stand requiring Max A x2 and verbal/visual/tactile cues for sequencing and body mechanics. Pt additionally required Max A x2 to maintain static standing. Pt demos significant BUE weakness and limited fxl activity tolerance.  Pt's R knee pain is also greatly inhibiting Pt's fxl mobility. Skilled OT intervention indicated in order to address deficits in fxl mobility, fxl activity tolerance, balance, strength, and use of adaptive techniques/equipment during performance of BADLs. Recommend SNF at discharge.  -       Row Name 10/07/24 1010          Therapy Assessment/Plan (OT)    Rehab Potential (OT) good, to achieve stated therapy goals  -     Criteria for Skilled Therapeutic Interventions Met (OT) yes;skilled treatment is necessary  -     Therapy Frequency (OT) 5 times/wk  -       Row Name 10/07/24 1010          Therapy Plan Review/Discharge Plan (OT)    Anticipated Discharge Disposition (OT) skilled nursing facility  -       Row Name 10/07/24 1010          Positioning and Restraints    Pre-Treatment Position in bed  -LS     Post Treatment Position bed  -LS     In Bed fowlers;side rails up x3;call light within reach;encouraged to call for assist;exit alarm on;waffle boots/both;SCD pump applied;notified nsg;patient within staff view  -               User Key  (r) = Recorded By, (t) = Taken By, (c) = Cosigned By      Initials Name Provider Type    Faith Posadas OTR/L Occupational Therapist                   Outcome Measures       Row Name 10/07/24 1010          How much help from another is currently needed...    Putting on and taking off regular lower body clothing? 1  -LS     Bathing (including washing, rinsing, and drying) 2  -LS     Toileting (which includes using toilet bed pan or urinal) 1  -LS     Putting on and taking off regular upper body clothing 2  -LS     Taking care of personal grooming (such as brushing teeth) 2  -LS     Eating meals 3  -LS     AM-PAC 6 Clicks Score (OT) 11  -       Row Name 10/07/24 1010          Functional Assessment    Outcome Measure Options AM-PAC 6 Clicks Daily Activity (OT)  -LS               User Key  (r) = Recorded By, (t) = Taken By, (c) = Cosigned By      Initials Name Provider  Type    LS Faith Purvis OTR/L Occupational Therapist                    Occupational Therapy Education       Title: PT OT SLP Therapies (In Progress)       Topic: Occupational Therapy (In Progress)       Point: ADL training (Done)       Description:   Instruct learner(s) on proper safety adaptation and remediation techniques during self care or transfers.   Instruct in proper use of assistive devices.                  Learning Progress Summary             Patient Acceptance, E, VU,NR by  at 10/7/2024 1105                         Point: Home exercise program (Not Started)       Description:   Instruct learner(s) on appropriate technique for monitoring, assisting and/or progressing therapeutic exercises/activities.                  Learner Progress:  Not documented in this visit.              Point: Precautions (Done)       Description:   Instruct learner(s) on prescribed precautions during self-care and functional transfers.                  Learning Progress Summary             Patient Acceptance, E, VU,NR by  at 10/7/2024 1105                         Point: Body mechanics (Done)       Description:   Instruct learner(s) on proper positioning and spine alignment during self-care, functional mobility activities and/or exercises.                  Learning Progress Summary             Patient Acceptance, E, VU,NR by  at 10/7/2024 1105                                         User Key       Initials Effective Dates Name Provider Type Discipline     06/20/22 -  Faith Purvis OTR/L Occupational Therapist OT                  OT Recommendation and Plan  Planned Therapy Interventions (OT): activity tolerance training, functional balance retraining, occupation/activity based interventions, ROM/therapeutic exercise, adaptive equipment training, BADL retraining, strengthening exercise, transfer/mobility retraining, patient/caregiver education/training, cognitive/visual perception retraining  Therapy Frequency (OT): 5  times/wk  Plan of Care Review  Plan of Care Reviewed With: patient  Progress: no change  Outcome Evaluation: OT eval completed. Pt in fowlers upon therapist arrival; A&O to person and time; c/o 8/10 buttock and R knee pain. Pt reports Mod I-I with all BADLs including fxl ambulation at baseline, however, Pt inconsisent with reports of PLOF initially. Today, Pt performed supine<>sit utilizing bedrail with HOB elevated with Max A x2 and verbal/visual/tactile cues for sequencing and body mechanics. Pt dependent to kyrie B socks. Pt performed sit<>stand requiring Max A x2 and verbal/visual/tactile cues for sequencing and body mechanics. Pt additionally required Max A x2 to maintain static standing. Pt demos significant BUE weakness and limited fxl activity tolerance. Pt's R knee pain is also greatly inhibiting Pt's fxl mobility. Skilled OT intervention indicated in order to address deficits in fxl mobility, fxl activity tolerance, balance, strength, and use of adaptive techniques/equipment during performance of BADLs. Recommend SNF at discharge.     Time Calculation:         Time Calculation- OT       Row Name 10/07/24 1010             Time Calculation- OT    OT Start Time 1010  +15 minutes chart review  -      OT Stop Time 1048  -      OT Time Calculation (min) 38 min  -      OT Non-Billable Time (min) 53 min  -      OT Received On 10/07/24  -      OT Goal Re-Cert Due Date 10/17/24  -                User Key  (r) = Recorded By, (t) = Taken By, (c) = Cosigned By      Initials Name Provider Type     Faith Purvis OTR/L Occupational Therapist                  Therapy Charges for Today       Code Description Service Date Service Provider Modifiers Qty    [id number]  OT EVAL MOD COMPLEXITY 4 10/7/2024 Faith Purvis, OTR/L GO 1                 Faith Purvis OTR/L  10/7/2024

## 2024-10-08 LAB
ALBUMIN SERPL-MCNC: 2 G/DL (ref 3.5–5.2)
ANION GAP SERPL CALCULATED.3IONS-SCNC: 10 MMOL/L (ref 5–15)
BACTERIA SPEC AEROBE CULT: ABNORMAL
BUN SERPL-MCNC: 47 MG/DL (ref 8–23)
BUN/CREAT SERPL: 21.6 (ref 7–25)
CALCIUM SPEC-SCNC: 8.7 MG/DL (ref 8.6–10.5)
CHLORIDE SERPL-SCNC: 109 MMOL/L (ref 98–107)
CO2 SERPL-SCNC: 18 MMOL/L (ref 22–29)
CREAT SERPL-MCNC: 2.18 MG/DL (ref 0.57–1)
DEPRECATED RDW RBC AUTO: 55.3 FL (ref 37–54)
EGFRCR SERPLBLD CKD-EPI 2021: 22.1 ML/MIN/1.73
ERYTHROCYTE [DISTWIDTH] IN BLOOD BY AUTOMATED COUNT: 16 % (ref 12.3–15.4)
GLUCOSE SERPL-MCNC: 134 MG/DL (ref 65–99)
HCT VFR BLD AUTO: 26.3 % (ref 34–46.6)
HGB BLD-MCNC: 7.8 G/DL (ref 12–15.9)
MCH RBC QN AUTO: 27.8 PG (ref 26.6–33)
MCHC RBC AUTO-ENTMCNC: 29.7 G/DL (ref 31.5–35.7)
MCV RBC AUTO: 93.6 FL (ref 79–97)
PHOSPHATE SERPL-MCNC: 4.1 MG/DL (ref 2.5–4.5)
PLATELET # BLD AUTO: 125 10*3/MM3 (ref 140–450)
PMV BLD AUTO: 11.9 FL (ref 6–12)
POTASSIUM SERPL-SCNC: 4 MMOL/L (ref 3.5–5.2)
RBC # BLD AUTO: 2.81 10*6/MM3 (ref 3.77–5.28)
SODIUM SERPL-SCNC: 137 MMOL/L (ref 136–145)
WBC NRBC COR # BLD AUTO: 10.92 10*3/MM3 (ref 3.4–10.8)

## 2024-10-08 PROCEDURE — 80069 RENAL FUNCTION PANEL: CPT | Performed by: INTERNAL MEDICINE

## 2024-10-08 PROCEDURE — 25010000002 CEFTRIAXONE PER 250 MG: Performed by: INTERNAL MEDICINE

## 2024-10-08 PROCEDURE — 97530 THERAPEUTIC ACTIVITIES: CPT

## 2024-10-08 PROCEDURE — 25010000002 HEPARIN (PORCINE) PER 1000 UNITS: Performed by: INTERNAL MEDICINE

## 2024-10-08 PROCEDURE — 92526 ORAL FUNCTION THERAPY: CPT

## 2024-10-08 PROCEDURE — 99232 SBSQ HOSP IP/OBS MODERATE 35: CPT | Performed by: UROLOGY

## 2024-10-08 PROCEDURE — 85027 COMPLETE CBC AUTOMATED: CPT | Performed by: INTERNAL MEDICINE

## 2024-10-08 RX ORDER — LACTULOSE 10 G/15ML
20 SOLUTION ORAL DAILY
Status: DISCONTINUED | OUTPATIENT
Start: 2024-10-08 | End: 2024-10-13

## 2024-10-08 RX ORDER — PANTOPRAZOLE SODIUM 40 MG/1
40 TABLET, DELAYED RELEASE ORAL
Status: DISCONTINUED | OUTPATIENT
Start: 2024-10-08 | End: 2024-10-15 | Stop reason: HOSPADM

## 2024-10-08 RX ORDER — POLYETHYLENE GLYCOL 3350 17 G/17G
17 POWDER, FOR SOLUTION ORAL 2 TIMES DAILY
Status: DISCONTINUED | OUTPATIENT
Start: 2024-10-08 | End: 2024-10-13

## 2024-10-08 RX ADMIN — OXYCODONE HYDROCHLORIDE 10 MG: 5 TABLET ORAL at 09:35

## 2024-10-08 RX ADMIN — ATORVASTATIN CALCIUM 40 MG: 40 TABLET, FILM COATED ORAL at 08:58

## 2024-10-08 RX ADMIN — CEFTRIAXONE SODIUM 2000 MG: 2 INJECTION, POWDER, FOR SOLUTION INTRAMUSCULAR; INTRAVENOUS at 08:59

## 2024-10-08 RX ADMIN — HEPARIN SODIUM 5000 UNITS: 5000 INJECTION, SOLUTION INTRAVENOUS; SUBCUTANEOUS at 14:15

## 2024-10-08 RX ADMIN — LACTULOSE 20 G: 20 SOLUTION ORAL at 09:00

## 2024-10-08 RX ADMIN — PANTOPRAZOLE SODIUM 40 MG: 40 TABLET, DELAYED RELEASE ORAL at 12:33

## 2024-10-08 RX ADMIN — POLYETHYLENE GLYCOL 3350 17 G: 17 POWDER, FOR SOLUTION ORAL at 08:57

## 2024-10-08 RX ADMIN — CHOLECALCIFEROL TAB 10 MCG (400 UNIT) 800 UNITS: 10 TAB at 08:59

## 2024-10-08 RX ADMIN — Medication 10 ML: at 09:00

## 2024-10-08 RX ADMIN — SERTRALINE HYDROCHLORIDE 25 MG: 50 TABLET ORAL at 08:59

## 2024-10-08 RX ADMIN — CARVEDILOL 12.5 MG: 6.25 TABLET, FILM COATED ORAL at 08:58

## 2024-10-08 RX ADMIN — LEVOTHYROXINE SODIUM 50 MCG: 100 TABLET ORAL at 06:04

## 2024-10-08 RX ADMIN — HEPARIN SODIUM 5000 UNITS: 5000 INJECTION, SOLUTION INTRAVENOUS; SUBCUTANEOUS at 06:04

## 2024-10-08 RX ADMIN — MUPIROCIN 1 APPLICATION: 20 OINTMENT TOPICAL at 08:58

## 2024-10-08 NOTE — PROGRESS NOTES
[facility] Intensivist Services  Progress Note    Patient Name: Jennifer Gomes  Date of Admission: 10/5/2024  Today's Date: 10/08/24  Length of Stay: 3  Primary Care Physician: Provider, No Known    Subjective     Chief Complaint: Cardiopulmonary arrest, encephalopathy    HPI:  No acute events overnight, patient resting bed breathing comfortably on 2 L satting 100%.  She continues to work with PT/OT.  Smith catheter to be replaced today due to urinary retention.  Otherwise no new complaints.    Review of Systems:  All pertinent negatives and positives are as above. All other systems have been reviewed and are negative unless otherwise stated.     ICU Course     Summary:  Patient presented to the ED on 10/5/2024 for deteriorating mental status.  After arriving back to her ER room from CT the patient had a cardiopulmonary arrest and underwent 1 round of ACLS with 1 dose of epinephrine given.  She was admitted to the ICU.  Workup significant for bacteremia with E. coli suspected from urinary source.  She is currently being treated with Rocephin.  She was successfully extubated on 10/6/2024.  Due to hydronephrosis and urinary retention she has been seen by urology.  Her Smith catheter has been replaced with plans to repeat the CT abdomen and pelvis on Thursday.    Objective      Physical Exam:  General: Well appearing, in no respiratory distress  Head: Normocephalic, atraumatic  Eyes: Conjunctiva clear, sclera non-icteric  Teeth/Gums: Normal dentition  Neck: Supple without stridor  Heart: Regular rate and rhythm, no murmur  Lungs: Clear to auscultation bilaterally, no wheezing  Abdomen: Soft, nontender  MSK/extremities: No cyanosis or edema  Neurologic: AOx3, grossly no focal deficits      Results Review:  Results from last 7 days   Lab Units 10/08/24  0216 10/07/24  0220 10/06/24  0240   SODIUM mmol/L 137 137 134*   POTASSIUM mmol/L 4.0 3.6 3.4*   CHLORIDE mmol/L 109* 107 101   CO2 mmol/L 18.0* 21.0*  "22.0   BUN mg/dL 47* 43* 44*   CALCIUM mg/dL 8.7 8.2* 8.0*     Results from last 7 days   Lab Units 10/08/24  0216 10/07/24  1234 10/07/24  0220 10/06/24  1334 10/06/24  0240   WBC 10*3/mm3 10.92*  --  12.52*  --  16.64*   HEMOGLOBIN g/dL 7.8* 7.4* 7.2*   < > 6.3*   HEMATOCRIT % 26.3* 23.8* 23.2*   < > 19.2*   PLATELETS 10*3/mm3 125*  --  127*  --  140    < > = values in this interval not displayed.     Visit Vitals  /60   Pulse 84   Temp 97.5 °F (36.4 °C) (Oral)   Resp 20   Ht 157.5 cm (62\")   Wt 79.6 kg (175 lb 6.4 oz)   LMP  (LMP Unknown)   SpO2 99%   BMI 32.08 kg/m²       Medications:  atorvastatin, 40 mg, Oral, Daily  carvedilol, 12.5 mg, Oral, Q12H  cefTRIAXone, 2,000 mg, Intravenous, Q24H  Chlorhexidine Gluconate Cloth, 1 Application, Topical, Q24H  cholecalciferol, 800 Units, Oral, Daily  donepezil, 10 mg, Oral, Nightly  heparin (porcine), 5,000 Units, Subcutaneous, Q8H  lactulose, 20 g, Oral, Daily  levothyroxine, 50 mcg, Oral, Q AM  mupirocin, 1 application , Each Nare, BID  pantoprazole, 40 mg, Oral, Q AM  polyethylene glycol, 17 g, Oral, BID  sertraline, 25 mg, Oral, Daily  sodium chloride, 10 mL, Intravenous, Q12H             Assessment/Plan     Problems:  Postcardiopulmonary arrest  E. coli bacteremia  UTI  Sepsis  Acute renal failure  Bilateral hydronephrosis  Hypertension  Anemia requiring transfusion  Hyponatremia  History of PUD     Plan:  E. coli bacteremia, UTI  -Blood cultures positive x 2 for E. coli which is sensitive to Rocephin  -De-escalate cefepime to Rocephin, daily 2 g daily for total of 14 days of antibiotics  -Repeat blood cultures negative to date  -Suspect urine is the source, though her urine culture is growing Morganella  -Previous cultures  have grown E. coli and Enterococcus     Bilateral hydronephrosis  -Urology is following, with plans to replace the Smith and repeat CT of the abdomen pelvis on Thursday (this has been ordered)    Postcardiopulmonary arrest  -Continue " supplemental oxygen as needed and wean as tolerated, goal O2 sat of 92% and above  -Extubated on 10/6/2024 and doing well on room air today  -Likely secondary to above  -Mental status improved, alert and oriented today     Sepsis  -Continue Rocephin for E. coli based on cultures  -Repeat blood culture negative to date     TOMÁS  -Creatinine is improving, continue oral hydration  -Follow-up metabolic panels     Anemia requiring transfusion  -Patient has a history of iron deficiency anemia  -No signs or symptoms of bleeding, H&H is stable  -Restart subcu heparin for DVT prophylaxis      Critical Care Set:  Feeding: Oral diet  Analgesia: As needed ordered  Sedation: N/A  Thromboprophylaxis: Subcu heparin  HOB: N/A  Ulcer prophylaxis: Restart home PPI  Glycemic control: At goal  SBT: Extubated 10/6/2024  Bowel movement: Increase regimen today to schedule MiraLAX and lactulose  Catheter removal: Continue Smith catheter for retention with hydronephrosis  Medication de-escalation: N/A    Disposition  Downgrade to floor today      Reuben Abernathy DO  Pulmonary Critical Care Medicine  10/8/2024  12:16 CDT

## 2024-10-08 NOTE — PROGRESS NOTES
"Enter Query Response Below      Query Response: Severe sepsis causing acute organ failure, acute renal failure and acute hypoxic hypercarbic respiratory failure              If applicable, please update the problem list.       Patient: Jennifer Gomes        : 1940  Account: [id number]           Admit Date:         How to Respond to this query:       a. Click New Note     b. Answer query within the yellow box.                c. Update the Problem List, if applicable.      If you have any questions about this query contact me at: [email or web address]       ,     84 year old female with dementia, CKD stage 3, chronic pain presented with altered mental status, weakness and is found to have \"Acute hypoxic hypercarbic respiratory failure\" per the H/P, \"E.coli Sepsis/bacteremia......Acute renal failure\" per the most recent progress note 10/6 with \"UTI appears to be the result of urinary retention.\"       -Patient experienced cardiac arrest with intubation on day of admission , presently extubated to 2L per NC.     -Patient has received intravenous fluids and antibiotics and has had mcclain catheter placed for urinary retention, with monitoring of labs for possible electrolyte replacements.      Please clarify if patient treated/monitored for the following:      Severe sepsis causing acute organ failure, acute renal failure and acute hypoxic hypercarbic  respiratory failure  Sepsis without organ dysfunction  Other (please specify) ___________  Unable to determine      By submitting this query, we are merely seeking further clarification of documentation to accurately reflect all conditions that you are monitoring, evaluating, treating or that extend the hospitalization or utilize additional resources of care. Please utilize your independent clinical judgment when addressing the question(s) above.     This query and your response, once completed, will be entered into the legal medical " record.    Sincerely,  Atul Urbina  Clinical Documentation Integrity Program

## 2024-10-08 NOTE — PLAN OF CARE
Goal Outcome Evaluation:  Plan of Care Reviewed With: patient        Progress: no change  Outcome Evaluation: PT tx completed. Pt. c/o bilateral knee pain. She c/o of pain with any touch and movement. she states this is chronic pain. Recommended to pt that she take her pain medication prior to tx. Pt. required Min-Mod assist for bed mobility. Explained to pt that the more she did on her own, the less pain she may experience. She was able to get to EOB. With encouragement, she participated with a few exercises, but was not interested in doing much. Again, explained the importance of therapy to improve her strength. Pt. needed Max assist to lay back. Will continue to work on strengthening and mobility as pt will allow.

## 2024-10-08 NOTE — THERAPY TREATMENT NOTE
Acute Care - Physical Therapy Treatment Note  [facility]     Patient Name: Jennifer Gomes  : 1940  MRN: [id number]  Today's Date: 10/8/2024      Visit Dx:     ICD-10-CM ICD-9-CM   1. Sepsis without acute organ dysfunction, due to unspecified organism  A41.9 038.9     995.91   2. Acute cystitis without hematuria  N30.00 595.0   3. Gastroenteritis  K52.9 558.9   4. Acute metabolic encephalopathy  G93.41 348.31   5. Acute respiratory failure with hypoxia  J96.01 518.81   6. Dysphagia, unspecified type [R13.10]  R13.10 787.20   7. Impaired mobility [Z74.09]  Z74.09 799.89     Patient Active Problem List   Diagnosis    Gastroesophageal reflux disease    Spinal stenosis, lumbar region, without neurogenic claudication    Overweight    Non-smoker    Erosion of vaginal mesh    Adenocarcinoma of endometrium    Encounter for consultation    S/P hysterectomy with oophorectomy    Encounter for follow-up surveillance of endometrial cancer    History of radiation therapy    Obesity (BMI 30-39.9)    Non-traumatic rhabdomyolysis    Metabolic encephalopathy    Acute renal failure superimposed on stage 3 chronic kidney disease    Acute respiratory failure with hypoxia and hypercapnia    Medical non-compliance, does not take narcotics as prescribed    SIRS (systemic inflammatory response syndrome)    Chronic constipation    Chronic pain syndrome    Chronic prescription opiate use    Anemia    Hypothyroidism (acquired)    Essential hypertension    TOMÁS (acute kidney injury)    Venous insufficiency of both lower extremities    Fluid retention    Low blood pressure reading    Obesity, Class III, BMI 40-49.9 (morbid obesity)    Chronic intractable headache    RAFAL (obstructive sleep apnea)    Change in bowel habits    Acute encephalopathy due to UTI    Toxic metabolic encephalopathy    Polypharmacy    Frequent falls    Grade III internal hemorrhoids    External hemorrhoids    E. coli UTI    Colitis    Moderate malnutrition     Transaminitis    Acute cholecystitis    Acute UTI (urinary tract infection)    Polypharmacy    Altered mental status    UTI (urinary tract infection)    Bacteremia due to Enterococcus    Dementia    Hypokalemia    Sepsis    Sepsis due to urinary tract infection     Past Medical History:   Diagnosis Date    Anxiety     Arthritis     Bronchitis     Cancer     uterine    Chronic pain     Depression     Disease of thyroid gland     Fibromyalgia     GERD (gastroesophageal reflux disease)     Headache     History of transfusion     AS     Hyperlipidemia     Hypertension     Incontinence     Insomnia     Leg pain     Lumbar stenosis     Migraines     Peptic ulcer     Restless legs     Sleep apnea     NO C-PAP    UTI (urinary tract infection)     Vaginal bleeding      Past Surgical History:   Procedure Laterality Date    BLADDER REPAIR      MESH HAD TO BE REMOVED IN 2013    BREAST BIOPSY Right 2017    benign    BREAST CYST EXCISION Left     CARDIAC CATHETERIZATION      CARPAL TUNNEL RELEASE      CATARACT EXTRACTION W/ INTRAOCULAR LENS  IMPLANT, BILATERAL      CHOLECYSTECTOMY WITH INTRAOPERATIVE CHOLANGIOGRAM N/A 2023    Procedure: CHOLECYSTECTOMY LAPAROSCOPIC INTRAOPERATIVE CHOLANGIOGRAM;  Surgeon: Gerardo Arciniega MD;  Location:  [facility] OR;  Service: General;  Laterality: N/A;    COLONOSCOPY      COLONOSCOPY N/A 10/01/2021    Procedure: COLONOSCOPY WITH ANESTHESIA;  Surgeon: Tom Velasco DO;  Location:  [facility];  Service: Gastroenterology;  Laterality: N/A;  pre: change in bowel habits  post: diverticulosis. hemorrhoids.   Olivia Mora APRN        CYSTECTOMY      D & C HYSTEROSCOPY N/A 2017    Procedure: DILATATION AND CURETTAGE HYSTEROSCOPY;  Surgeon: Shasta Madrigal MD;  Location: [facility] OR;  Service:     DILATION AND CURETTAGE, DIAGNOSTIC / THERAPEUTIC      ENDOSCOPY  2010    Short segment of Arriola's,Moderate chroninc esophagogastritis and negative H.pylori     ENDOSCOPY N/A 09/25/2017    Procedure: ESOPHAGOGASTRODUODENOSCOPY WITH ANESTHESIA;  Surgeon: Tom Velasco DO;  Location:  [facility];  Service:     EYE SURGERY      RETINA    HEMORRHOIDECTOMY SIGMOIDOSCOPY N/A 3/21/2023    Procedure: HEMORRHOIDECTOMY WITH EXAM UNDER ANESTHESIA;  Surgeon: Holly Chavez MD;  Location:  [facility] OR;  Service: General;  Laterality: N/A;    HYSTERECTOMY  12/20/2017    ORIF TIBIA/FIBULA FRACTURES Left 2000    TRANSVAGINAL TAPING SUSPENSION N/A 11/06/2017    Procedure: VAGINAL MESH REVISION;  Surgeon: Shasta Madrigal MD;  Location:  [facility] OR;  Service:     VAGINAL MESH REVISION  2013     PT Assessment (Last 12 Hours)       PT Evaluation and Treatment       Row Name 10/08/24 0916          Physical Therapy Time and Intention    Subjective Information complains of;pain;weakness  -     Document Type therapy note (daily note)  -     Mode of Treatment physical therapy  -       Row Name 10/08/24 0916          General Information    Existing Precautions/Restrictions fall  -       Row Name 10/08/24 0916          Pain    Pretreatment Pain Rating 7/10  -     Posttreatment Pain Rating 7/10  -     Pain Location - Side/Orientation Bilateral  -     Pain Location generalized  -     Pain Location - knee  -     Pre/Posttreatment Pain Comment pt yelling out with any movement of legs.  -     Pain Intervention(s) Repositioned;Ambulation/increased activity;Medication (See MAR)  -       Row Name 10/08/24 0916          Bed Mobility    Rolling Right Kay (Bed Mobility) verbal cues;moderate assist (50% patient effort)  -     Scooting/Bridging Kay (Bed Mobility) dependent (less than 25% patient effort);2 person assist  -     Supine-Sit Kay (Bed Mobility) verbal cues;moderate assist (50% patient effort)  -     Sit-Supine Kay (Bed Mobility) verbal cues;maximum assist (25% patient effort)  -     Assistive Device (Bed Mobility) head of bed  elevated;bed rails;draw sheet  -     Comment, (Bed Mobility) very slow to move, but wanted to do as much as she could to lessen pain.  -       Row Name 10/08/24 0916          Transfers    Comment, (Transfers) did not attempt to stand due to weakness and pain.  -       Row Name 10/08/24 0916          Balance    Comment, Balance SBA for sitting balance-CGA during exercises. Pt. does not like to be touched though.  -       Row Name 10/08/24 0916          Hip (Therapeutic Exercise)    Hip (Therapeutic Exercise) AROM (active range of motion)  -     Hip AROM (Therapeutic Exercise) bilateral;flexion;sitting;10 repetitions  -       Row Name 10/08/24 0916          Knee (Therapeutic Exercise)    Knee (Therapeutic Exercise) AROM (active range of motion)  -     Knee AROM (Therapeutic Exercise) bilateral;LAQ (long arc quad);sitting;10 repetitions  -       Row Name 10/08/24 0916          Ankle (Therapeutic Exercise)    Ankle (Therapeutic Exercise) AROM (active range of motion)  -     Ankle AROM (Therapeutic Exercise) bilateral;dorsiflexion;sitting;10 repetitions  -       Row Name 10/08/24 0916          Plan of Care Review    Plan of Care Reviewed With patient  -     Progress no change  -     Outcome Evaluation PT tx completed. Pt. c/o bilateral knee pain. She c/o of pain with any touch and movement. she states this is chronic pain. Recommended to pt that she take her pain medication prior to tx. Pt. required Min-Mod assist for bed mobility. Explained to pt that the more she did on her own, the less pain she may experience. She was able to get to EOB. With encouragement, she participated with a few exercises, but was not interested in doing much. Again, explained the importance of therapy to improve her strength. Pt. needed Max assist to lay back. Will continue to work on strengthening and mobility as pt will allow.  -       Row Name 10/08/24 0916          Positioning and Restraints    Pre-Treatment  Position in bed  -     Post Treatment Position bed  -     In Bed notified nsg;side lying right;call light within reach;encouraged to call for assist;patient within staff view;side rails up x2;RUE elevated;LUE elevated;SCD pump applied;L waffle boot;R waffle boot;pillow between legs  -               User Key  (r) = Recorded By, (t) = Taken By, (c) = Cosigned By      Initials Name Provider Type    Marisol Juarez, PTA Physical Therapist Assistant                    Physical Therapy Education       Title: PT OT SLP Therapies (In Progress)       Topic: Physical Therapy (In Progress)       Point: Mobility training (In Progress)       Learning Progress Summary             Patient Acceptance, E, NR by  at 10/7/2024 1158    Comment: role of PT, call for assist, d/c planning, high fall risk                         Point: Home exercise program (In Progress)       Learning Progress Summary             Patient Acceptance, E, NR by  at 10/7/2024 1158    Comment: role of PT, call for assist, d/c planning, high fall risk                         Point: Body mechanics (In Progress)       Learning Progress Summary             Patient Acceptance, E, NR by  at 10/7/2024 1158    Comment: role of PT, call for assist, d/c planning, high fall risk                         Point: Precautions (In Progress)       Learning Progress Summary             Patient Acceptance, E, NR by  at 10/7/2024 1158    Comment: role of PT, call for assist, d/c planning, high fall risk                                         User Key       Initials Effective Dates Name Provider Type [facility] 08/15/24 -  Sunny Esquivel, HUNTER DPT Physical Therapist PT                  PT Recommendation and Plan     Plan of Care Reviewed With: patient  Progress: no change  Outcome Evaluation: PT tx completed. Pt. c/o bilateral knee pain. She c/o of pain with any touch and movement. she states this is chronic pain. Recommended to pt that she take her pain  medication prior to tx. Pt. required Min-Mod assist for bed mobility. Explained to pt that the more she did on her own, the less pain she may experience. She was able to get to EOB. With encouragement, she participated with a few exercises, but was not interested in doing much. Again, explained the importance of therapy to improve her strength. Pt. needed Max assist to lay back. Will continue to work on strengthening and mobility as pt will allow.   Outcome Measures       Row Name 10/08/24 0916             How much help from another person do you currently need...    Turning from your back to your side while in flat bed without using bedrails? 2  -MF      Moving from lying on back to sitting on the side of a flat bed without bedrails? 2  -MF      Moving to and from a bed to a chair (including a wheelchair)? 1  -MF      Standing up from a chair using your arms (e.g., wheelchair, bedside chair)? 2  -MF      Climbing 3-5 steps with a railing? 1  -MF      To walk in hospital room? 1  -MF      AM-PAC 6 Clicks Score (PT) 9  -MF      Highest Level of Mobility Goal 3 --> Sit at edge of bed  -MF         Functional Assessment    Outcome Measure Options AM-PAC 6 Clicks Basic Mobility (PT)  -                User Key  (r) = Recorded By, (t) = Taken By, (c) = Cosigned By      Initials Name Provider Type    Marisol Juarez, PTA Physical Therapist Assistant                     Time Calculation:    PT Charges       Row Name 10/08/24 1641             Time Calculation    Start Time 0916  -      Stop Time 1010  -      Time Calculation (min) 54 min  -MF      PT Received On 10/08/24  -         Time Calculation- PT    Total Timed Code Minutes- PT 54 minute(s)  -MF         Timed Charges    84215 - PT Therapeutic Activity Minutes 54  -MF         Total Minutes    Timed Charges Total Minutes 54  -MF       Total Minutes 54  -MF                User Key  (r) = Recorded By, (t) = Taken By, (c) = Cosigned By      Initials Name  Provider Type     Marisol Zuniga PTA Physical Therapist Assistant                  Therapy Charges for Today       Code Description Service Date Service Provider Modifiers Qty    58462045628 HC PT THERAPEUTIC ACT EA 15 MIN 10/8/2024 Marisol Zuniga PTA GP 4            PT G-Codes  Outcome Measure Options: AM-PAC 6 Clicks Basic Mobility (PT)  AM-PAC 6 Clicks Score (PT): 9  AM-PAC 6 Clicks Score (OT): 11    Marisol Zuniga PTA  10/8/2024

## 2024-10-08 NOTE — PROGRESS NOTES
"Enter Query Response Below      Query Response: Hyponatremia- clinically insignificant              If applicable, please update the problem list.       Patient: Jennifer Gomes        : 1940  Account: [id number]           Admit Date:         How to Respond to this query:       a. Click New Note     b. Answer query within the yellow box.                c. Update the Problem List, if applicable.      If you have any questions about this query contact me at: [email or web address]       ,     84 year old female with dementia, CKD stage 3, chronic pain presented with altered mental status, weakness, and is noted to have \"Acute renal failure.....Hyponatremia-monitor for now\" per progress note 10/6.  Sodium levels in the stay are:  10/5- 133 (02:01)-> 135 (05:51)-> 136 (09:27)  10/6- 134  10/7- 137  10/8- 137  Patient has been given intravenous normal saline at  ccs /hr., labs monitored.    Please clarify the following:    Hyponatremia- clinically significant  Hyponatremia- clinically insignificant  Other- specify______  Unable to determine      By submitting this query, we are merely seeking further clarification of documentation to accurately reflect all conditions that you are monitoring, evaluating, treating or that extend the hospitalization or utilize additional resources of care. Please utilize your independent clinical judgment when addressing the question(s) above.     This query and your response, once completed, will be entered into the legal medical record.    Sincerely,  Atul Urbina  Clinical Documentation Integrity Program     "

## 2024-10-08 NOTE — PLAN OF CARE
Goal Outcome Evaluation:      Pt has rested throughout shift. Bladder scanned q6h, see chart. Refused some turns r/t pain, PRN meds given. Hgb stable. VSS. Call light in reach. Pt safety maintained.

## 2024-10-08 NOTE — NURSING NOTE
[facility]  INPATIENT WOUND & OSTOMY CARE    Today's Date: 10/08/24    Patient Name: Jennifer Gomes  MRN: [id number]  CSN: [id number]  PCP: Provider, No Known  Attending Provider: Quentin Abernathy*  Length of Stay: 3    Rounded on patient due to patient being at risk for skin breakdown and low Maikol scores. Patient is currently laying in bed resting. She has no family at bedside, her nurse Marnie is at bedside providing care.     Bilateral heels assessed, skin intact. Applied new Allevyn life silicone border foam dressings to bilateral heels. Offloaded heels utilizing offloading heel boots.    Turned patient to assess sacral spine, skin intact. Applied new Allevyn life silicone border foam dressing for protection.     Apply silicone border foam dressing per protocol to sacral spine/bilateral heels for protection.  Nursing to change dressing every 3 days and PRN if soiled. Nursing is to peel back dressing with every assessment to assess skin underneath dressing. No barrier cream under dressing.     Patient is currently on a comfort glide with an absorbant pad. Wedges are at bedside for nursing to utilizing for turning. All pressure ulcer prevention measures have been ordered per protocol.     Patient educated on importance of turning and repositioning at frequent intervals to prevent skin breakdown.     Please reach out to wound care with any issues or concerns.     This document has been electronically signed by Sadie Roy RN on 10/8/2024 13:43 CDT

## 2024-10-08 NOTE — PROGRESS NOTES
[facility]    Palliative Care Daily Progress Note   Chief complaint-follow up support for patient/family    Code Status:   Code Status and Medical Interventions: CPR (Attempt to Resuscitate); Full Support   Ordered at: 10/07/24 1320     Code Status (Patient has no pulse and is not breathing):    CPR (Attempt to Resuscitate)     Medical Interventions (Patient has pulse or is breathing):    Full Support      Advanced Directives: Advance Directive Status: Patient does not have advance directive   Goals of Care: Ongoing.     S: Medical record reviewed. Events noted.  Urology consulted due to bilateral hydronephrosis and UTI, recommends following to determine bladder emptying, bowel management, and broad-spectrum antibiotics x 14 days total for complicated UTI.  Due to postvoid residuals greater than 200 a Smith catheter has been replaced today and plans for repeat CT in 48 hours. Blood cultures pending. Nursing reports decreased oral intake and fatigue. Laying in bed without apparent distress with eyes closed.  No family at bedside.  Spoke with patient's daughter, Lola via telephone and updated on plan of care as above.  Questions answered to her satisfaction.     Review of Systems   Constitutional: Positive for malaise/fatigue.   Respiratory:  Positive for shortness of breath.    Musculoskeletal:  Positive for myalgias.   Genitourinary:  Positive for incomplete emptying.   Neurological:  Positive for weakness.     Pain Assessment  Preferred Pain Scale: number (Numeric Rating Pain Scale)  Nonverbal Indicators of Pain: grimace  CPOT Facial Expression: 0-->relaxed, neutral  CPOT Body Movements: 0-->absence of movements  CPOT Muscle Tension: 0-->relaxed  Ventilator Compliance/Vocalization: 0-->talking in normal tone or no sound  CPOT Score: 0  Pain Location: knee  Pain Description: aching, sore    O:     Intake/Output Summary (Last 24 hours) at 10/8/2024 2448  Last data  filed at 10/8/2024 0600  Gross per 24 hour   Intake --   Output 800 ml   Net -800 ml       Diagnostics and current medications: Reviewed.      Current Facility-Administered Medications:     acetaminophen (TYLENOL) tablet 650 mg, 650 mg, Oral, Q4H PRN, 650 mg at 10/07/24 1056 **OR** acetaminophen (TYLENOL) 160 MG/5ML oral solution 650 mg, 650 mg, Oral, Q4H PRN, 650 mg at 10/06/24 1232 **OR** acetaminophen (TYLENOL) suppository 650 mg, 650 mg, Rectal, Q4H PRN, Cuba Espitia MD    atorvastatin (LIPITOR) tablet 40 mg, 40 mg, Oral, Daily, Quentin Abernathy DO, 40 mg at 10/08/24 0858    sennosides-docusate (PERICOLACE) 8.6-50 MG per tablet 2 tablet, 2 tablet, Oral, BID PRN, 2 tablet at 10/07/24 1505 **AND** [DISCONTINUED] polyethylene glycol (MIRALAX) packet 17 g, 17 g, Oral, Daily PRN **AND** bisacodyl (DULCOLAX) EC tablet 5 mg, 5 mg, Oral, Daily PRN **AND** bisacodyl (DULCOLAX) suppository 10 mg, 10 mg, Rectal, Daily PRN, Cuba Espitia MD    carvedilol (COREG) tablet 12.5 mg, 12.5 mg, Oral, Q12H, Zeinab Trujillo MD, 12.5 mg at 10/08/24 0858    cefTRIAXone (ROCEPHIN) 2,000 mg in sodium chloride 0.9 % 100 mL MBP, 2,000 mg, Intravenous, Q24H, Quentin Abernathy DO, Last Rate: 200 mL/hr at 10/08/24 0859, 2,000 mg at 10/08/24 0859    Chlorhexidine Gluconate Cloth 2 % pads 1 Application, 1 Application, Topical, Q24H, Zeinab Trujillo MD, 1 Application at 10/07/24 0330    cholecalciferol (VITAMIN D3) tablet 800 Units, 800 Units, Oral, Daily, Quentin Abernathy DO, 800 Units at 10/08/24 0859    donepezil (ARICEPT) tablet 10 mg, 10 mg, Oral, Nightly, Quentin Abernathy DO, 10 mg at 10/07/24 2050    heparin (porcine) 5000 UNIT/ML injection 5,000 Units, 5,000 Units, Subcutaneous, Q8H, Quentin Abernathy DO, 5,000 Units at 10/08/24 0604    lactulose solution 20 g, 20 g, Oral, Daily, Quentin Abernathy DO, 20 g at 10/08/24 0900    levothyroxine (SYNTHROID, LEVOTHROID)  "tablet 50 mcg, 50 mcg, Oral, Q AM, Quentin Abernathy DO, 50 mcg at 10/08/24 0604    melatonin tablet 10 mg, 10 mg, Oral, Nightly PRN, Han Gibbs APRN, 10 mg at 10/07/24 2050    mupirocin (BACTROBAN) 2 % nasal ointment 1 Application, 1 application , Each Nare, BID, Zeinab Trujillo MD, 1 Application at 10/08/24 0858    oxyCODONE (ROXICODONE) immediate release tablet 10 mg, 10 mg, Oral, Q4H PRN, Quentin Abernathy DO, 10 mg at 10/08/24 0935    polyethylene glycol (MIRALAX) packet 17 g, 17 g, Oral, BID, Quentin Abernathy DO, 17 g at 10/08/24 0857    sertraline (ZOLOFT) tablet 25 mg, 25 mg, Oral, Daily, Quentin Abernathy DO, 25 mg at 10/08/24 0859    sodium chloride 0.9 % flush 10 mL, 10 mL, Intravenous, PRN, Ta Castillo MD    sodium chloride 0.9 % flush 10 mL, 10 mL, Intravenous, Q12H, Cuba Espitia MD, 10 mL at 10/08/24 0900    sodium chloride 0.9 % flush 10 mL, 10 mL, Intravenous, PRN, Cuba Espitia MD    Allergies   Allergen Reactions    Ropinirole Hcl Shortness Of Breath    Codeine Itching and Mental Status Change    Definity [Perflutren Lipid Microsphere] Other (See Comments)     Severe back pain    Ambien [Zolpidem] Other (See Comments)     HYPER     Eszopiclone Other (See Comments)     MAKES PT HYPER     Pregabalin Dizziness    Tizanidine Other (See Comments)     Terrible nightmares     I have utilized all available immediate resources to obtain, update, or review the patient's current medications (including all prescriptions, over-the-counter products, herbals, cannabis/cannabidiol products, and vitamin/mineral/dietary (nutritional) supplements) for name, route of administration, type, dose and frequency.    A:    /57   Pulse 90   Temp 97.5 °F (36.4 °C) (Oral)   Resp 20   Ht 157.5 cm (62\")   Wt 79.6 kg (175 lb 6.4 oz)   LMP  (LMP Unknown)   SpO2 99%   BMI 32.08 kg/m²     Vitals and nursing note reviewed.   Constitutional:       Appearance: " Not in distress. Ill-appearing and chronically ill-appearing.      Interventions: Nasal cannula in place.   Eyes:      General: Lids are normal.   HENT:      Head: Normocephalic.   Pulmonary:      Effort: Pulmonary effort is normal.   Cardiovascular:      Normal rate.   Edema:     Peripheral edema absent.   Musculoskeletal: Normal range of motion.      Cervical back: Neck supple.   Skin:     General: Skin is warm.   Genitourinary:     Comments: Smith catheter in place  Neurological:      Mental Status: sleeping  Psychiatric:         Mood and Affect: appears calm     Patient status: Disease state: Controlled with current treatments.  Current Functional status: Palliative Performance Scale Score: Performance 30% based on the following measures: Ambulation: Totally bed bound, Activity and Evidence of Disease: Unable to do any work, extensive evidence of disease, Self-Care: Total care required,  Intake: Reduced, LOC: Full, drowsy or confusion   Baseline Functional status: Palliative Performance Scale Score: Performance 70% based on the following measures: Ambulation: Reduced, Activity and Evidence of Disease: Unable to do normal work, some evidence of disease, Self-Care: Fully independent,  Intake: Normal or reduced, LOC: Full   Baseline ECOG Status(2) Ambulatory and capable of self care, unable to carry out work activity, up and about > 50% or waking hours.  Nutritional status: Albumin 2.3 Body mass index is 32.08 kg/m².      Hospital Problem List      Sepsis    Sepsis due to urinary tract infection     Impression/Problem List:     Cardiopulmonary arrest  Alzheimer's dementia  Sepsis due to bacteremia-E. 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 plan: Goals of care include CODE STATUS CPR/full support interventions.     Family support: The patient receives support from her  and daughter..  Advance Directives: Not on file     POA/Healthcare surrogate-spouse and daughter-next of kin.     2.  Palliative care encounter  - Prognosis is guarded long-term secondary to cardiopulmonary arrest, Alzheimer's dementia, sepsis, acute kidney injury, adenocarcinoma of endometrium, multiple comorbidities, and advanced age.  -Patient and family appears to have fair prognostic awareness.      -Apparently patient admitted as DNR/DO NOT INTUBATE.  Transferred to CT and experience cardiopulmonary arrest and a CODE BLUE was called and received CPR x 5-10 minutes per report.  Placed in code chill protocol at 0501.  Admitted to critical care unit on ventilator support with sepsis.  Started on IV fluids and IV antibiotics.  Hypothermia protocol discontinued.  Acute kidney injury felt likely multifactorial.    10/6-unit of PRBC 10/6.    -Extubated to nasal cannula.    -Evaluated by speech therapy due to mild oral dysphagia and started on thin liquid soft to chew solid with chopped meat diet.    -Therapy recommends SNF.   -Anticipating hospice at discharge per intensivist.   10/7-Urology consulted due to bilateral hydronephrosis and UTI, recommends following to determine bladder emptying, bowel management, and broad-spectrum antibiotics x 14 days total for complicated UTI.    10/8-Due to postvoid residuals greater than 200 a Smith catheter has been replaced today and plans for repeat CT in 48 hours.     10/7-clarified CODE STATUS CPR/full support interventions    10/8-continue supportive measures  -Urology following, a Smith catheter  placed due to incomplete emptying.  -daughter updated on plan of care  -Anticipating home with home health.  Will need PCP prior to discharge, electronic communication to SW.  -At risk for rehospitalization's and decline given multiple comorbid factors and cardiopulmonary arrest event      Thank you for allowing us to participate in patient's plan of care. Palliative Care Team will continue to follow patient.     Joana Rush, APRN  10/8/2024  09:48 CDT

## 2024-10-08 NOTE — THERAPY TREATMENT NOTE
Acute Care - Speech Language Pathology   Swallow Treatment Note [facility]     Patient Name: Jennifer Gomes  : 1940  MRN: [id number]  Today's Date: 10/8/2024               Admit Date: 10/5/2024  Pt was alert and cooperative for PO trials, but did appear confused in conversation. She took trials of regular solids and thin liquids. No overt s/s of aspiration were observed. She had good mastication and clearance of oral residue with the regular solid. Recommend advancing to regular solids, continue thin liquids. SLP will follow up to ensure tolerance of diet upgrade.   Serena Vasquez, CCC-SLP 10/8/2024 15:02 CDT    Visit Dx:     ICD-10-CM ICD-9-CM   1. Sepsis without acute organ dysfunction, due to unspecified organism  A41.9 038.9     995.91   2. Acute cystitis without hematuria  N30.00 595.0   3. Gastroenteritis  K52.9 558.9   4. Acute metabolic encephalopathy  G93.41 348.31   5. Acute respiratory failure with hypoxia  J96.01 518.81   6. Dysphagia, unspecified type [R13.10]  R13.10 787.20   7. Impaired mobility [Z74.09]  Z74.09 799.89     Patient Active Problem List   Diagnosis    Gastroesophageal reflux disease    Spinal stenosis, lumbar region, without neurogenic claudication    Overweight    Non-smoker    Erosion of vaginal mesh    Adenocarcinoma of endometrium    Encounter for consultation    S/P hysterectomy with oophorectomy    Encounter for follow-up surveillance of endometrial cancer    History of radiation therapy    Obesity (BMI 30-39.9)    Non-traumatic rhabdomyolysis    Metabolic encephalopathy    Acute renal failure superimposed on stage 3 chronic kidney disease    Acute respiratory failure with hypoxia and hypercapnia    Medical non-compliance, does not take narcotics as prescribed    SIRS (systemic inflammatory response syndrome)    Chronic constipation    Chronic pain syndrome    Chronic prescription opiate use    Anemia    Hypothyroidism (acquired)    Essential hypertension    TOMÁS (acute  kidney injury)    Venous insufficiency of both lower extremities    Fluid retention    Low blood pressure reading    Obesity, Class III, BMI 40-49.9 (morbid obesity)    Chronic intractable headache    RAFAL (obstructive sleep apnea)    Change in bowel habits    Acute encephalopathy due to UTI    Toxic metabolic encephalopathy    Polypharmacy    Frequent falls    Grade III internal hemorrhoids    External hemorrhoids    E. coli UTI    Colitis    Moderate malnutrition    Transaminitis    Acute cholecystitis    Acute UTI (urinary tract infection)    Polypharmacy    Altered mental status    UTI (urinary tract infection)    Bacteremia due to Enterococcus    Dementia    Hypokalemia    Sepsis    Sepsis due to urinary tract infection     Past Medical History:   Diagnosis Date    Anxiety     Arthritis     Bronchitis     Cancer     uterine    Chronic pain     Depression     Disease of thyroid gland     Fibromyalgia     GERD (gastroesophageal reflux disease)     Headache     History of transfusion     AS     Hyperlipidemia     Hypertension     Incontinence     Insomnia     Leg pain     Lumbar stenosis     Migraines     Peptic ulcer     Restless legs     Sleep apnea     NO C-PAP    UTI (urinary tract infection)     Vaginal bleeding      Past Surgical History:   Procedure Laterality Date    BLADDER REPAIR      MESH HAD TO BE REMOVED IN 2013    BREAST BIOPSY Right 2017    benign    BREAST CYST EXCISION Left     CARDIAC CATHETERIZATION      CARPAL TUNNEL RELEASE      CATARACT EXTRACTION W/ INTRAOCULAR LENS  IMPLANT, BILATERAL      CHOLECYSTECTOMY WITH INTRAOPERATIVE CHOLANGIOGRAM N/A 2023    Procedure: CHOLECYSTECTOMY LAPAROSCOPIC INTRAOPERATIVE CHOLANGIOGRAM;  Surgeon: Gerardo Arciniega MD;  Location: [facility] OR;  Service: General;  Laterality: N/A;    COLONOSCOPY      COLONOSCOPY N/A 10/01/2021    Procedure: COLONOSCOPY WITH ANESTHESIA;  Surgeon: Tom Velasco DO;  Location: [facility] ENDOSCOPY;  Service:  Gastroenterology;  Laterality: N/A;  pre: change in bowel habits  post: diverticulosis. hemorrhoids.   Olivia Mora APRN        CYSTECTOMY      D & C HYSTEROSCOPY N/A 11/06/2017    Procedure: DILATATION AND CURETTAGE HYSTEROSCOPY;  Surgeon: Shasta Madrigal MD;  Location: [facility] OR;  Service:     DILATION AND CURETTAGE, DIAGNOSTIC / THERAPEUTIC  2008    ENDOSCOPY  09/23/2010    Short segment of Arriola's,Moderate chroninc esophagogastritis and negative H.pylori    ENDOSCOPY N/A 09/25/2017    Procedure: ESOPHAGOGASTRODUODENOSCOPY WITH ANESTHESIA;  Surgeon: Tom Velasco DO;  Location:  [facility] ENDOSCOPY;  Service:     EYE SURGERY      RETINA    HEMORRHOIDECTOMY SIGMOIDOSCOPY N/A 3/21/2023    Procedure: HEMORRHOIDECTOMY WITH EXAM UNDER ANESTHESIA;  Surgeon: Holly Chavez MD;  Location: [facility] OR;  Service: General;  Laterality: N/A;    HYSTERECTOMY  12/20/2017    ORIF TIBIA/FIBULA FRACTURES Left 2000    TRANSVAGINAL TAPING SUSPENSION N/A 11/06/2017    Procedure: VAGINAL MESH REVISION;  Surgeon: Shasta Madrigal MD;  Location: [facility] OR;  Service:     VAGINAL MESH REVISION  2013       SLP Recommendation and Plan     SLP Diet Recommendation: regular textures, thin liquids (10/08/24 1430)                                         Daily Summary of Progress (SLP): progress towards functional goals is fair (10/08/24 1430)               Treatment Assessment (SLP): improved (10/08/24 1430)  Treatment Assessment Comments (SLP): Advance to regular solids, continue thin liquids (10/08/24 1430)  Plan for Continued Treatment (SLP): goals adjusted to reflect functional improvements demonstrated (10/08/24 1430)                SWALLOW EVALUATION (Last 72 Hours)       SLP Adult Swallow Evaluation       Row Name 10/08/24 1430 10/07/24 0900 10/06/24 1509             Rehab Evaluation    Document Type therapy note (daily note)  -MB -- evaluation  -JR      Subjective Information no complaints  -MB -- complains of;pain  -JR       Patient Observations alert;cooperative  -MB -- alert;cooperative  -      Patient/Family/Caregiver Comments/Observations No family present  -MB -- no family present  -JR      Patient Effort adequate  -MB -- good  -JR      Symptoms Noted During/After Treatment -- -- none  -JR         General Information    Patient Profile Reviewed -- -- yes  -JR      Pertinent History Of Current Problem -- -- Pt presented to ED d/t AMS. Hx of HTN, GERD, CKD stage III, chronic pain. Dx of sepsis d/t UTI. ST consulted after failed post extubation dysphagia screen.  -JR      Current Method of Nutrition -- -- nasogastric feedings  -JR      Precautions/Limitations, Vision -- -- WFL;for purposes of eval  -JR      Precautions/Limitations, Hearing -- -- WFL;for purposes of eval  -JR      Prior Level of Function-Communication -- -- unknown  -JR      Prior Level of Function-Swallowing -- -- no diet consistency restrictions  -JR      Plans/Goals Discussed with -- -- patient;other (see comments)  ELBA Olsen  -      Patient's Goals for Discharge -- -- return to PO diet  -JR      Family Goals for Discharge -- -- family did not state  -JR         Pain    Additional Documentation Pain Scale: FACES Pre/Post-Treatment (Group)  -MB -- Pain Scale: FACES Pre/Post-Treatment (Group)  -JR         Pain Scale: FACES Pre/Post-Treatment    Pain: FACES Scale, Pretreatment 2-->hurts little bit  -MB -- 2-->hurts little bit  -JR      Posttreatment Pain Rating -- -- 4-->hurts little more  -JR      Pain Location - Side/Orientation -- -- Bilateral  -JR      Pain Location -- -- lower  -JR      Pain Location - -- -- knee  -JR      Pre/Posttreatment Pain Comment When SLP raised HOB  -MB -- pt repositioned  -JR         Oral Motor Structure and Function    Dentition Assessment -- -- natural, present and adequate  -JR      Secretion Management -- -- WNL/WFL  -JR      Mucosal Quality -- -- dry  -JR      Gag Response -- -- WFL  -JR      Volitional Swallow -- -- weak  -JR       Volitional Cough -- -- WFL  -         Oral Musculature and Cranial Nerve Assessment    Oral Motor General Assessment -- -- generalized oral motor weakness  -         General Eating/Swallowing Observations    Respiratory Support Currently in Use -- -- nasal cannula  -JR      O2 Liters -- -- 3L  -JR      Eating/Swallowing Skills -- -- fed by SLP  -      Positioning During Eating -- -- upright in bed  -      Utensils Used -- -- spoon;straw  -      Consistencies Trialed -- -- pureed;thin liquids;regular textures  -         Clinical Swallow Eval    Oral Prep Phase -- -- impaired  -JR      Oral Transit -- -- impaired  -JR      Oral Residue -- -- WFL  -      Pharyngeal Phase -- -- no overt signs/symptoms of pharyngeal impairment  -      Esophageal Phase -- -- unremarkable  -         Oral Prep Concerns    Oral Prep Concerns -- -- poor rotary chew  -      Poor Rotary Chew -- -- regular consistencies  -         Oral Transit Concerns    Oral Transit Concerns -- -- delayed initiation of bolus transit  -      Delayed Intiation of Bolus Transit -- -- all consistencies  -         SLP Evaluation Clinical Impression    SLP Swallowing Diagnosis -- -- oral dysphagia;R/O pharyngeal dysphagia  -      Functional Impact -- -- risk of aspiration/pneumonia  -      Rehab Potential/Prognosis, Swallowing -- -- good, to achieve stated therapy goals  -      Swallow Criteria for Skilled Therapeutic Interventions Met -- -- demonstrates skilled criteria  -         SLP Treatment Clinical Impressions    Treatment Assessment (SLP) improved  -MB improved  -MB (r) KG (t) MB (c) --      Treatment Assessment Comments (SLP) Advance to regular solids, continue thin liquids  -MB -- --      Daily Summary of Progress (SLP) progress towards functional goals is fair  -MB progress toward functional goals is good  -MB (r) KG (t) MB (c) --      Barriers to Overall Progress (SLP) Cognitive status  -MB Medically complex  -MB (r)  KG (t) MB (c) --      Plan for Continued Treatment (SLP) goals adjusted to reflect functional improvements demonstrated  -MB continue treatment per plan of care  -MB (r) KG (t) MB (c) --      Care Plan Review -- evaluation/treatment results reviewed;care plan/treatment goals reviewed;risks/benefits reviewed;current/potential barriers reviewed;patient/other agree to care plan  -MB (r) KG (t) MB (c) --         Recommendations    Therapy Frequency (Swallow) -- -- at least;3 days per week  -JR      Predicted Duration Therapy Intervention (Days) -- -- 1 week  -      SLP Diet Recommendation regular textures;thin liquids  -MB -- soft to chew textures;chopped;thin liquids  -      Recommended Diagnostics -- -- reassess via clinical swallow evaluation  -      Recommended Precautions and Strategies -- -- upright posture during/after eating;small bites of food and sips of liquid;alternate between small bites of food and sips of liquid;general aspiration precautions;reflux precautions  -      Oral Care Recommendations -- -- Oral Care BID/PRN  -      SLP Rec. for Method of Medication Administration -- -- meds whole;with puree;as tolerated  -      Monitor for Signs of Aspiration -- -- yes;notify SLP if any concerns  -         Swallow Goals (SLP)    Swallow LTGs -- Swallow Long Term Goal (free text)  -MB (r) KG (t) MB (c) Swallow Long Term Goal (free text)  -      Swallow STGs -- diet tolerance goal selection (SLP)  -MB (r) KG (t) MB (c) diet tolerance goal selection (SLP)  -      Diet Tolerance Goal Selection (SLP) -- Patient will tolerate trials of  -MB (r) KG (t) MB (c) Patient will tolerate trials of  -JR         (LTG) Swallow    (LTG) Swallow Pt will tolerate LRD with no overt s/s of aspiration.  -MB Pt will tolerate LRD with no overt s/s of aspiration.  -MB (r) KG (t) MB (c) Pt will tolerate LRD with no overt s/s of aspiration.  -      [place] (Swallow Long Term Goal) independently (over 90% accuracy)   -MB independently (over 90% accuracy)  -MB (r) KG (t) MB (c) independently (over 90% accuracy)  -JR      Time Frame (Swallow Long Term Goal) 1 week  -MB 1 week  -MB (r) KG (t) MB (c) 1 week  -JR      Barriers (Swallow Long Term Goal) medically complex  -MB medically complex  -MB (r) KG (t) MB (c) --      Progress/Outcomes (Swallow Long Term Goal) continuing progress toward goal  -MB continuing progress toward goal  -MB (r) KG (t) MB (c) new goal  -JR      Comment (Swallow Long Term Goal) see note  -MB see note  -MB (r) KG (t) MB (c) --         (STG) Patient will tolerate trials of    Consistencies Trialed (Tolerate trials) soft to chew (chopped) textures;soft to chew (whole) textures;regular textures;thin liquids  -MB soft to chew (chopped) textures;soft to chew (whole) textures;regular textures;thin liquids  -MB (r) KG (t) MB (c) soft to chew (chopped) textures;soft to chew (whole) textures;regular textures;thin liquids  -JR      Desired Outcome (Tolerate trials) without signs/symptoms of aspiration  -MB without signs/symptoms of aspiration  -MB (r) KG (t) MB (c) without signs/symptoms of aspiration  -JR      [place] (Tolerate trials) independently (over 90% accuracy)  -MB independently (over 90% accuracy)  -MB (r) KG (t) MB (c) independently (over 90% accuracy)  -JR      Time Frame (Tolerate trials) by discharge  -MB -- --      Progress/Outcomes (Tolerate trials) continuing progress toward goal  -MB continuing progress toward goal  -MB (r) KG (t) MB (c) new goal  -JR      Comment (Tolerate trials) -- see note  -MB (r) KG (t) MB (c) --                User Key  (r) = Recorded By, (t) = Taken By, (c) = Cosigned By      Initials Name Effective Dates    Serena Gan, CCC-SLP 02/03/23 -     Wendy Jones MS CCC-SLP 08/22/23 -     Wade Casillas, Speech Therapy Student 08/12/24 -                     EDUCATION  The patient has been educated in the following areas:   Dysphagia (Swallowing Impairment).  difficulty walking, decreased strength, decreased balance        SLP GOALS       Row Name 10/08/24 1430 10/07/24 0900 10/06/24 1509       (LTG) Swallow    (LTG) Swallow Pt will tolerate LRD with no overt s/s of aspiration.  -MB Pt will tolerate LRD with no overt s/s of aspiration.  -MB (r) KG (t) MB (c) Pt will tolerate LRD with no overt s/s of aspiration.  -JR    [place] (Swallow Long Term Goal) independently (over 90% accuracy)  -MB independently (over 90% accuracy)  -MB (r) KG (t) MB (c) independently (over 90% accuracy)  -JR    Time Frame (Swallow Long Term Goal) 1 week  -MB 1 week  -MB (r) KG (t) MB (c) 1 week  -JR    Barriers (Swallow Long Term Goal) medically complex  -MB medically complex  -MB (r) KG (t) MB (c) --    Progress/Outcomes (Swallow Long Term Goal) continuing progress toward goal  -MB continuing progress toward goal  -MB (r) KG (t) MB (c) new goal  -JR    Comment (Swallow Long Term Goal) see note  -MB see note  -MB (r) KG (t) MB (c) --       (STG) Patient will tolerate trials of    Consistencies Trialed (Tolerate trials) soft to chew (chopped) textures;soft to chew (whole) textures;regular textures;thin liquids  -MB soft to chew (chopped) textures;soft to chew (whole) textures;regular textures;thin liquids  -MB (r) KG (t) MB (c) soft to chew (chopped) textures;soft to chew (whole) textures;regular textures;thin liquids  -JR    Desired Outcome (Tolerate trials) without signs/symptoms of aspiration  -MB without signs/symptoms of aspiration  -MB (r) KG (t) MB (c) without signs/symptoms of aspiration  -JR    [place] (Tolerate trials) independently (over 90% accuracy)  -MB independently (over 90% accuracy)  -MB (r) KG (t) MB (c) independently (over 90% accuracy)  -JR    Time Frame (Tolerate trials) by discharge  -MB -- --    Progress/Outcomes (Tolerate trials) continuing progress toward goal  -MB continuing progress toward goal  -MB (r) KG (t) MB (c) new goal  -JR    Comment (Tolerate trials) -- see note  -MB (r) ELIZABETH (t) MB (c) --              User  Key  (r) = Recorded By, (t) = Taken By, (c) = Cosigned By      Initials Name Provider Type    Serena Gan CCC-SLP Speech and Language Pathologist    Wendy Jones, MS CCC-SLP Speech and Language Pathologist    Wade Casillas, Speech Therapy Student SLP Student                         Time Calculation:    Time Calculation- SLP       Row Name 10/08/24 1501             Time Calculation- SLP    SLP Start Time 1430  -MB      SLP Stop Time 1501  -MB      SLP Time Calculation (min) 31 min  -MB      SLP Received On 10/08/24  -MB         Untimed Charges    30665-LD Treatment Swallow Minutes 31  -MB         Total Minutes    Untimed Charges Total Minutes 31  -MB       Total Minutes 31  -MB                User Key  (r) = Recorded By, (t) = Taken By, (c) = Cosigned By      Initials Name Provider Type    Serena Gan CCC-SLP Speech and Language Pathologist                    Therapy Charges for Today       Code Description Service Date Service Provider Modifiers Qty    83371704822 HC ST TREATMENT SWALLOW 3 10/7/2024 Serena Vasquez CCC-SLP GN 1    [id number] HC ST TREATMENT SWALLOW 2 10/8/2024 Serena Vasquez CCC-SLP GN 1                 NICHO العلي  10/8/2024

## 2024-10-08 NOTE — PROGRESS NOTES
LOS: 3 days   Patient Care Team:  Provider, No Known as PCP - General  [facility]  Eleazar Ramires MD as Cardiologist (Cardiology)  Shasta Madrigal MD as Referring Physician (Obstetrics and Gynecology)  Holly Chavez MD as Consulting Physician (General Surgery)    Chief Complaint:  UTI, bilateral hydronephrosis    Subjective     Interval History:     No complaints. Denies flank pain.     Review of Systems  Pertinent items are noted in HPI, all other systems reviewed and negative     Objective     Vital Signs  Temp:  [98.7 °F (37.1 °C)-100.4 °F (38 °C)] 98.7 °F (37.1 °C)  Heart Rate:  [74-96] 90  Resp:  [18-20] 20  BP: (120-153)/(46-64) 148/57    Physical Exam:  Comfortable appearing. Nontoxic.      Data Review:       I have reviewed the following data:    CBC (No Diff) (10/08/2024 02:16)    Renal Function Panel (10/08/2024 02:16)    Medication Review:     Current Facility-Administered Medications:     acetaminophen (TYLENOL) tablet 650 mg, 650 mg, Oral, Q4H PRN, 650 mg at 10/07/24 1056 **OR** acetaminophen (TYLENOL) 160 MG/5ML oral solution 650 mg, 650 mg, Oral, Q4H PRN, 650 mg at 10/06/24 1232 **OR** acetaminophen (TYLENOL) suppository 650 mg, 650 mg, Rectal, Q4H PRN, Cuba Espitia MD    atorvastatin (LIPITOR) tablet 40 mg, 40 mg, Oral, Daily, Quentin Abernathy, DO, 40 mg at 10/07/24 1217    sennosides-docusate (PERICOLACE) 8.6-50 MG per tablet 2 tablet, 2 tablet, Oral, BID PRN, 2 tablet at 10/07/24 1505 **AND** [DISCONTINUED] polyethylene glycol (MIRALAX) packet 17 g, 17 g, Oral, Daily PRN **AND** bisacodyl (DULCOLAX) EC tablet 5 mg, 5 mg, Oral, Daily PRN **AND** bisacodyl (DULCOLAX) suppository 10 mg, 10 mg, Rectal, Daily PRN, Cuba Espitia MD    carvedilol (COREG) tablet 12.5 mg, 12.5 mg, Oral, Q12H, Zeinab Trujillo MD, 12.5 mg at 10/07/24 2050    cefTRIAXone (ROCEPHIN) 2,000 mg in sodium chloride 0.9 % 100 mL MBP, 2,000 mg, Intravenous, Q24H, Quentin Abernathy,  DO    Chlorhexidine Gluconate Cloth 2 % pads 1 Application, 1 Application, Topical, Q24H, Zeinab Trujillo MD, 1 Application at 10/07/24 0330    cholecalciferol (VITAMIN D3) tablet 800 Units, 800 Units, Oral, Daily, Quentin Abernathy DO, 800 Units at 10/07/24 1218    donepezil (ARICEPT) tablet 10 mg, 10 mg, Oral, Nightly, Quentin Abernathy DO, 10 mg at 10/07/24 2050    heparin (porcine) 5000 UNIT/ML injection 5,000 Units, 5,000 Units, Subcutaneous, Q8H, Quentin Abernathy DO, 5,000 Units at 10/08/24 0604    lactulose solution 20 g, 20 g, Oral, Daily, Quentin Abernathy DO    levothyroxine (SYNTHROID, LEVOTHROID) tablet 50 mcg, 50 mcg, Oral, Q AM, Quentin Abernathy DO, 50 mcg at 10/08/24 0604    melatonin tablet 10 mg, 10 mg, Oral, Nightly PRN, Han Gibbs APRN, 10 mg at 10/07/24 2050    mupirocin (BACTROBAN) 2 % nasal ointment 1 Application, 1 application , Each Nare, BID, Zeinab Trujillo MD, 1 Application at 10/07/24 2050    oxyCODONE (ROXICODONE) immediate release tablet 10 mg, 10 mg, Oral, Q4H PRN, Quentin Abernathy DO, 10 mg at 10/07/24 1959    polyethylene glycol (MIRALAX) packet 17 g, 17 g, Oral, BID, Quentin Abernathy DO    sertraline (ZOLOFT) tablet 25 mg, 25 mg, Oral, Daily, Quentin Abernathy DO, 25 mg at 10/07/24 1217    sodium chloride 0.9 % flush 10 mL, 10 mL, Intravenous, PRN, Ta Castillo MD    sodium chloride 0.9 % flush 10 mL, 10 mL, Intravenous, Q12H, Cuba Espitia MD, 10 mL at 10/07/24 2050    sodium chloride 0.9 % flush 10 mL, 10 mL, Intravenous, PRN, Cuba Espitia MD    Assessment and Plan:    Bilateral hydronephrosis: Post-void residuals are still greater than 200 cc. Replace Smith. Repeat Ct scan in 48 hours.     UTI: Continue broad spectrum antibiotics pending urine culture. She will need 14 days of culture specific antibiotics for complicated UTI.     URO DISPO: I will assess the patient again in the  morning.     I discussed the patient's findings and my recommendations with patient, nursing staff, and Dr. Abernathy    Discussed etiology of bilateral hydronephrosis with patient. Discussed hydroureteronephrosis down to bladder.     (Please note that portions of this note were completed with a voice recognition program.)    Moiz Gallegos MD  10/08/24  08:36 CDT    Time: Time spent: 20 minutes spent performing evaluation and management, chart review, and discussion with patient, > 50% of time spent in face-to-face encounter

## 2024-10-08 NOTE — PROGRESS NOTES
Inpatient Nutrition Services  Patient Name:  Jennifer Gomes  YOB: 1940  MRN: [id number]  Admit Date:  10/5/2024  Assessment Date:  10/8/2024       Reason for Assessment       Row Name 10/08/24 1102          Reason for Assessment    Reason For Assessment follow-up protocol     Diagnosis pulmonary disease;infection/sepsis                    Nutrition/Diet History       Row Name 10/08/24 1102          Nutrition/Diet History    Typical Intake (Food/Fluid/EN/PN) Extubated 10/6 and started PO diet. No EAD. Discontinued TF orders today. Pt consumed breakfast at EOB, 30% of one meal available x 72 hr chart review. Glu ranges 108-172, no accuchecks ordered and no glu-lowering meds on MAR. Working with PT and OT this morning. Hx of CDiff in 2023 but no BM since admit. Bowel regimen on MAR.  Wt on admit 175.4lb bed scale; no new weights for review. Will continue to observe SLP recommendations for food/fluid texture/consistency and follow per protocol.     Food Intolerance(s) NKFA     Factors Affecting Nutritional Intake cognitive status/motor function                    Labs/Tests/Procedures/Meds       Row Name 10/08/24 1105          Labs/Procedures/Meds    Lab Results Reviewed reviewed     Lab Results Comments Glu        Diagnostic Tests/Procedures    Diagnostic Test/Procedure Reviewed reviewed        Medications    Pertinent Medications Reviewed reviewed     Pertinent Medications Comments See MAR                    Physical Findings       Row Name 10/08/24 1105          Physical Findings    Overall Physical Appearance NC, Maikol Score 16, no skin breakdown, no BM since admission, no edema, feeds self, GCS 15                    Estimated/Assessed Needs - Anthropometrics       Row Name 10/08/24 1105          Anthropometrics    Weight for Calculation 79.6 kg (175 lb 7.8 oz)        Estimated/Assessed Needs    Additional Documentation Fluid Requirements (Group);KCAL/KG (Group);Protein Requirements (Group)         KCAL/KG    KCAL/KG 20 Kcal/Kg (kcal)     20 Kcal/Kg (kcal) 1592        Protein Requirements    Weight Used For Protein Calculations 49.9 kg (110 lb)  IBW     Est Protein Requirement Amount (gms/kg) 0.9 gm protein     Estimated Protein Requirements (gms/day) 44.91        Fluid Requirements    Fluid Requirements (mL/day) 1592     RDA Method (mL) 1592                    Nutrition Prescription Ordered       Row Name 10/08/24 1106          Nutrition Prescription PO    Current PO Diet Soft Texture     Texture Chopped     Fluid Consistency Thin                    Evaluation of Received Nutrient/Fluid Intake       Row Name 10/08/24 1106          Nutrient/Fluid Evaluation    Number of Days Evaluated 3 days        Fluid Intake Evaluation    Oral Fluid (mL) 200  72 hr total        PO Evaluation    Number of Days PO Intake Evaluated Insufficient Data     Number of Meals 1     % PO Intake 30        EN Evaluation    Number of Days EN Intake Evaluated Insufficient Data     TF Changes Discontinued                       Problem/Interventions:   Problem 1       Row Name 10/08/24 1106          Nutrition Diagnoses Problem 1    Problem 1 Predicted Suboptimal Intake     Etiology (related to) Medical Diagnosis     Infectious Disease Sepsis;UTI     Neurological Alzheimer's     Pulmonary/Critical Care Extubated     Renal CKD     Signs/Symptoms (evidenced by) PO Intake;SLP/Swallow eval     Percent (%) intake recorded 30 %     Over number of meals 1     Swallow eval status Done     Type of SLP Evaluation Bedside                          Intervention Goal       Row Name 10/08/24 1108          Intervention Goal    General Disease management/therapy;Meet nutritional needs for age/condition     PO Tolerate PO;Meet estimated needs;Increase intake;Continue positive trend     Weight Maintain weight                    Nutrition Intervention       Row Name 10/08/24 110          Nutrition Intervention    RD/Tech Action Follow Tx progress;Care plan  reviewd                    Nutrition Prescription       Row Name 10/08/24 1107          Nutrition Prescription PO    PO Prescription Other (comment)  per SLP        Nutrition Prescription EN    Enteral Prescription Discontinue enteral feeding                    Education/Evaluation       Row Name 10/08/24 1107          Education    Education No discharge needs identified at this time        Monitor/Evaluation    Monitor Per protocol                     Electronically signed by:  Urmila Encarnacion RDN, LD  10/08/24 11:07 CDT

## 2024-10-08 NOTE — PROGRESS NOTES
"Enter Query Response Below      Query Response: Metabolic encephalopathy due to sepsis              If applicable, please update the problem list.       Patient: Jennifer Gomes        : 1940  Account: [id number]           Admit Date:         How to Respond to this query:       a. Click New Note     b. Answer query within the yellow box.                c. Update the Problem List, if applicable.      If you have any questions about this query contact me at: [email or web address]       ,     84 year old female with dementia on Aricept presented due to \" family concerned over deteriorating mental status\" per ED documentation. ED documentation also reports \"has episodes of altered mental status cycling w/complete coherency, trouble w/word finding as of late, discusses hesitancy while urinating,  w/in the last three days.\"  Patient was found to have E. coli bacteremia/sepsis/UTI/cardiopulmonary arrest requiring intubation in the stay,  was treated with intravenous fluids, antibiotics.  GCS on admission per nursing was 13, GCS presently is 15, \"A/O x3.....Mental status improved, alert and oriented today.\"      Please clarify if patient treated/monitored for one or more of the following:    Metabolic encephalopathy due to sepsis  Exacerbation of dementia with behavioral disturbance  Other- specify__________    By submitting this query, we are merely seeking further clarification of documentation to accurately reflect all conditions that you are monitoring, evaluating, treating or that extend the hospitalization or utilize additional resources of care. Please utilize your independent clinical judgment when addressing the question(s) above.     This query and your response, once completed, will be entered into the legal medical record.    Sincerely,  Atul Urbina  Clinical Documentation Integrity Program   "

## 2024-10-09 PROBLEM — I46.9 CARDIOPULMONARY ARREST: Status: ACTIVE | Noted: 2024-10-09

## 2024-10-09 PROBLEM — J96.01 ACUTE RESPIRATORY FAILURE WITH HYPOXIA AND HYPERCAPNIA: Status: RESOLVED | Noted: 2019-04-11 | Resolved: 2024-10-09

## 2024-10-09 PROBLEM — N17.9 AKI (ACUTE KIDNEY INJURY): Status: RESOLVED | Noted: 2019-05-16 | Resolved: 2024-10-09

## 2024-10-09 PROBLEM — D64.9 ANEMIA REQUIRING TRANSFUSIONS: Status: ACTIVE | Noted: 2024-10-09

## 2024-10-09 PROBLEM — B96.20 E COLI BACTEREMIA: Status: ACTIVE | Noted: 2024-10-09

## 2024-10-09 PROBLEM — R78.81 E COLI BACTEREMIA: Status: ACTIVE | Noted: 2024-10-09

## 2024-10-09 PROBLEM — G93.41 METABOLIC ENCEPHALOPATHY: Status: RESOLVED | Noted: 2019-04-11 | Resolved: 2024-10-09

## 2024-10-09 PROBLEM — R65.10 SIRS (SYSTEMIC INFLAMMATORY RESPONSE SYNDROME): Status: RESOLVED | Noted: 2019-04-11 | Resolved: 2024-10-09

## 2024-10-09 PROBLEM — N13.30 BILATERAL HYDRONEPHROSIS: Status: ACTIVE | Noted: 2024-10-09

## 2024-10-09 PROBLEM — J96.02 ACUTE RESPIRATORY FAILURE WITH HYPOXIA AND HYPERCAPNIA: Status: RESOLVED | Noted: 2019-04-11 | Resolved: 2024-10-09

## 2024-10-09 PROBLEM — R78.81 BACTEREMIA DUE TO ENTEROCOCCUS: Status: RESOLVED | Noted: 2023-11-09 | Resolved: 2024-10-09

## 2024-10-09 PROBLEM — B95.2 BACTEREMIA DUE TO ENTEROCOCCUS: Status: RESOLVED | Noted: 2023-11-09 | Resolved: 2024-10-09

## 2024-10-09 LAB
ALBUMIN SERPL-MCNC: 2.3 G/DL (ref 3.5–5.2)
ANION GAP SERPL CALCULATED.3IONS-SCNC: 11 MMOL/L (ref 5–15)
BUN SERPL-MCNC: 44 MG/DL (ref 8–23)
BUN/CREAT SERPL: 24.4 (ref 7–25)
CALCIUM SPEC-SCNC: 9.1 MG/DL (ref 8.6–10.5)
CHLORIDE SERPL-SCNC: 106 MMOL/L (ref 98–107)
CO2 SERPL-SCNC: 21 MMOL/L (ref 22–29)
CREAT SERPL-MCNC: 1.8 MG/DL (ref 0.57–1)
DEPRECATED RDW RBC AUTO: 49.7 FL (ref 37–54)
EGFRCR SERPLBLD CKD-EPI 2021: 27.8 ML/MIN/1.73
ERYTHROCYTE [DISTWIDTH] IN BLOOD BY AUTOMATED COUNT: 15.3 % (ref 12.3–15.4)
GLUCOSE SERPL-MCNC: 124 MG/DL (ref 65–99)
HCT VFR BLD AUTO: 24.5 % (ref 34–46.6)
HGB BLD-MCNC: 7.6 G/DL (ref 12–15.9)
MCH RBC QN AUTO: 27.6 PG (ref 26.6–33)
MCHC RBC AUTO-ENTMCNC: 31 G/DL (ref 31.5–35.7)
MCV RBC AUTO: 89.1 FL (ref 79–97)
PHOSPHATE SERPL-MCNC: 3.3 MG/DL (ref 2.5–4.5)
PLATELET # BLD AUTO: 155 10*3/MM3 (ref 140–450)
PMV BLD AUTO: 10.8 FL (ref 6–12)
POTASSIUM SERPL-SCNC: 3.9 MMOL/L (ref 3.5–5.2)
RBC # BLD AUTO: 2.75 10*6/MM3 (ref 3.77–5.28)
SODIUM SERPL-SCNC: 138 MMOL/L (ref 136–145)
WBC NRBC COR # BLD AUTO: 7.93 10*3/MM3 (ref 3.4–10.8)

## 2024-10-09 PROCEDURE — 80069 RENAL FUNCTION PANEL: CPT | Performed by: INTERNAL MEDICINE

## 2024-10-09 PROCEDURE — 92526 ORAL FUNCTION THERAPY: CPT

## 2024-10-09 PROCEDURE — 97535 SELF CARE MNGMENT TRAINING: CPT

## 2024-10-09 PROCEDURE — 97110 THERAPEUTIC EXERCISES: CPT

## 2024-10-09 PROCEDURE — 25010000002 CEFTRIAXONE PER 250 MG: Performed by: INTERNAL MEDICINE

## 2024-10-09 PROCEDURE — 85027 COMPLETE CBC AUTOMATED: CPT | Performed by: INTERNAL MEDICINE

## 2024-10-09 PROCEDURE — 25010000002 HEPARIN (PORCINE) PER 1000 UNITS: Performed by: INTERNAL MEDICINE

## 2024-10-09 PROCEDURE — 97530 THERAPEUTIC ACTIVITIES: CPT

## 2024-10-09 PROCEDURE — 99232 SBSQ HOSP IP/OBS MODERATE 35: CPT | Performed by: CLINICAL NURSE SPECIALIST

## 2024-10-09 PROCEDURE — 99232 SBSQ HOSP IP/OBS MODERATE 35: CPT | Performed by: UROLOGY

## 2024-10-09 RX ADMIN — CARVEDILOL 12.5 MG: 6.25 TABLET, FILM COATED ORAL at 21:01

## 2024-10-09 RX ADMIN — Medication 10 ML: at 21:08

## 2024-10-09 RX ADMIN — OXYCODONE HYDROCHLORIDE 10 MG: 5 TABLET ORAL at 12:40

## 2024-10-09 RX ADMIN — POLYETHYLENE GLYCOL 3350 17 G: 17 POWDER, FOR SOLUTION ORAL at 08:51

## 2024-10-09 RX ADMIN — CHOLECALCIFEROL TAB 10 MCG (400 UNIT) 800 UNITS: 10 TAB at 08:52

## 2024-10-09 RX ADMIN — OXYCODONE HYDROCHLORIDE 10 MG: 5 TABLET ORAL at 03:34

## 2024-10-09 RX ADMIN — Medication 10 ML: at 08:53

## 2024-10-09 RX ADMIN — SERTRALINE HYDROCHLORIDE 25 MG: 50 TABLET ORAL at 08:51

## 2024-10-09 RX ADMIN — CEFTRIAXONE SODIUM 2000 MG: 2 INJECTION, POWDER, FOR SOLUTION INTRAMUSCULAR; INTRAVENOUS at 08:50

## 2024-10-09 RX ADMIN — CARVEDILOL 12.5 MG: 6.25 TABLET, FILM COATED ORAL at 08:51

## 2024-10-09 RX ADMIN — LACTULOSE 20 G: 20 SOLUTION ORAL at 08:52

## 2024-10-09 RX ADMIN — HEPARIN SODIUM 5000 UNITS: 5000 INJECTION, SOLUTION INTRAVENOUS; SUBCUTANEOUS at 17:33

## 2024-10-09 RX ADMIN — ATORVASTATIN CALCIUM 40 MG: 40 TABLET, FILM COATED ORAL at 08:50

## 2024-10-09 RX ADMIN — HEPARIN SODIUM 5000 UNITS: 5000 INJECTION, SOLUTION INTRAVENOUS; SUBCUTANEOUS at 21:02

## 2024-10-09 RX ADMIN — DONEPEZIL HYDROCHLORIDE 10 MG: 10 TABLET, FILM COATED ORAL at 21:02

## 2024-10-09 NOTE — THERAPY TREATMENT NOTE
"Acute Care - Speech Language Pathology   Swallow Treatment Note [facility]     Patient Name: Jennifer Gomes  : 1940  MRN: [id number]  Today's Date: 10/9/2024               Admit Date: 10/5/2024  Pt seen over breakfast to assess safety and tolerance of current diet. Upon arrival, pt slightly reclined in bed attempting to set up meal tray. SLP repositioned pt upright and assisted with meal tray setup. She continues to demonstrate confusion in conversation. She persistently questioned SLP about identity and stated that she was not sure where she was at this time. Pt completed two trials of regular solids and several trials of thin liquids with no overt s/s of aspiration. She then held her chest and when questioned if she was okay she stated that she was \"exhausted from opening packages\". Pt reported no further questions or concerns. RN made aware of pt state. Difficult to determine if confusion is d/t delirium, UTI, or unknown cause. Pt may benefit from cognitive assessment if confusion persists. ST will continue to follow.     Wendy Sosa MS CCC-SLP 10/9/2024 10:11 CDT      Visit Dx:     ICD-10-CM ICD-9-CM   1. Sepsis without acute organ dysfunction, due to unspecified organism  A41.9 038.9     995.91   2. Acute cystitis without hematuria  N30.00 595.0   3. Gastroenteritis  K52.9 558.9   4. Acute metabolic encephalopathy  G93.41 348.31   5. Acute respiratory failure with hypoxia  J96.01 518.81   6. Dysphagia, unspecified type [R13.10]  R13.10 787.20   7. Impaired mobility [Z74.09]  Z74.09 799.89     Patient Active Problem List   Diagnosis    Gastroesophageal reflux disease    Spinal stenosis, lumbar region, without neurogenic claudication    Overweight    Non-smoker    Erosion of vaginal mesh    Adenocarcinoma of endometrium    Encounter for consultation    S/P hysterectomy with oophorectomy    Encounter for follow-up surveillance of endometrial cancer    History of radiation therapy    Obesity (BMI 30-39.9)    " Non-traumatic rhabdomyolysis    Metabolic encephalopathy    Acute renal failure superimposed on stage 3 chronic kidney disease    Acute respiratory failure with hypoxia and hypercapnia    Medical non-compliance, does not take narcotics as prescribed    SIRS (systemic inflammatory response syndrome)    Chronic constipation    Chronic pain syndrome    Chronic prescription opiate use    Anemia    Hypothyroidism (acquired)    Essential hypertension    TOMÁS (acute kidney injury)    Venous insufficiency of both lower extremities    Fluid retention    Low blood pressure reading    Obesity, Class III, BMI 40-49.9 (morbid obesity)    Chronic intractable headache    RAFAL (obstructive sleep apnea)    Change in bowel habits    Acute encephalopathy due to UTI    Toxic metabolic encephalopathy    Polypharmacy    Frequent falls    Grade III internal hemorrhoids    External hemorrhoids    E. coli UTI    Colitis    Moderate malnutrition    Transaminitis    Acute cholecystitis    Acute UTI (urinary tract infection)    Polypharmacy    Altered mental status    UTI (urinary tract infection)    Bacteremia due to Enterococcus    Dementia    Hypokalemia    Sepsis    Sepsis due to urinary tract infection     Past Medical History:   Diagnosis Date    Anxiety     Arthritis     Bronchitis     Cancer     uterine    Chronic pain     Depression     Disease of thyroid gland     Fibromyalgia     GERD (gastroesophageal reflux disease)     Headache     History of transfusion     AS     Hyperlipidemia     Hypertension     Incontinence     Insomnia     Leg pain     Lumbar stenosis     Migraines     Peptic ulcer     Restless legs     Sleep apnea     NO C-PAP    UTI (urinary tract infection)     Vaginal bleeding      Past Surgical History:   Procedure Laterality Date    BLADDER REPAIR      MESH HAD TO BE REMOVED IN 2013    BREAST BIOPSY Right 2017    benign    BREAST CYST EXCISION Left     CARDIAC CATHETERIZATION      CARPAL TUNNEL RELEASE       CATARACT EXTRACTION W/ INTRAOCULAR LENS  IMPLANT, BILATERAL      CHOLECYSTECTOMY WITH INTRAOPERATIVE CHOLANGIOGRAM N/A 9/7/2023    Procedure: CHOLECYSTECTOMY LAPAROSCOPIC INTRAOPERATIVE CHOLANGIOGRAM;  Surgeon: Gerardo Arciniega MD;  Location: [facility] OR;  Service: General;  Laterality: N/A;    COLONOSCOPY      COLONOSCOPY N/A 10/01/2021    Procedure: COLONOSCOPY WITH ANESTHESIA;  Surgeon: Tom Velasco DO;  Location: [facility] ENDOSCOPY;  Service: Gastroenterology;  Laterality: N/A;  pre: change in bowel habits  post: diverticulosis. hemorrhoids.   Olivia Mora APRN        CYSTECTOMY      D & C HYSTEROSCOPY N/A 11/06/2017    Procedure: DILATATION AND CURETTAGE HYSTEROSCOPY;  Surgeon: Shasta Madrigal MD;  Location: [facility] OR;  Service:     DILATION AND CURETTAGE, DIAGNOSTIC / THERAPEUTIC  2008    ENDOSCOPY  09/23/2010    Short segment of Arriola's,Moderate chroninc esophagogastritis and negative H.pylori    ENDOSCOPY N/A 09/25/2017    Procedure: ESOPHAGOGASTRODUODENOSCOPY WITH ANESTHESIA;  Surgeon: Tom Velasco DO;  Location: [facility] ENDOSCOPY;  Service:     EYE SURGERY      RETINA    HEMORRHOIDECTOMY SIGMOIDOSCOPY N/A 3/21/2023    Procedure: HEMORRHOIDECTOMY WITH EXAM UNDER ANESTHESIA;  Surgeon: Holly Chavez MD;  Location: [facility] OR;  Service: General;  Laterality: N/A;    HYSTERECTOMY  12/20/2017    ORIF TIBIA/FIBULA FRACTURES Left 2000    TRANSVAGINAL TAPING SUSPENSION N/A 11/06/2017    Procedure: VAGINAL MESH REVISION;  Surgeon: Shasta Madrigal MD;  Location: [facility] OR;  Service:     VAGINAL MESH REVISION  2013       SLP Recommendation and Plan                                               Daily Summary of Progress (SLP): progress towards functional goals is fair (10/09/24 0757)               Treatment Assessment (SLP): continued (10/09/24 0757)  Treatment Assessment Comments (SLP): see note (10/09/24 0757)  Plan for Continued Treatment (SLP): continue treatment per plan of care (10/09/24  0757)         Plan of Care Reviewed With: patient  Progress: no change  Outcome Evaluation: see note      SWALLOW EVALUATION (Last 72 Hours)       SLP Adult Swallow Evaluation       Row Name 10/09/24 0757 10/08/24 1430 10/07/24 0900 10/06/24 1509          Rehab Evaluation    Document Type therapy note (daily note)  -JR therapy note (daily note)  -MB -- evaluation  -JR     Subjective Information no complaints  -JR no complaints  -MB -- complains of;pain  -JR     Patient Observations alert;cooperative  -JR alert;cooperative  -MB -- alert;cooperative  -JR     Patient/Family/Caregiver Comments/Observations no family present  -JR No family present  -MB -- no family present  -JR     Patient Effort adequate  -JR adequate  -MB -- good  -JR     Symptoms Noted During/After Treatment none  -JR -- -- none  -JR        General Information    Patient Profile Reviewed yes  -JR -- -- yes  -JR     Pertinent History Of Current Problem -- -- -- Pt presented to ED d/t AMS. Hx of HTN, GERD, CKD stage III, chronic pain. Dx of sepsis d/t UTI. ST consulted after failed post extubation dysphagia screen.  -JR     Current Method of Nutrition -- -- -- nasogastric feedings  -JR     Precautions/Limitations, Vision -- -- -- WFL;for purposes of eval  -JR     Precautions/Limitations, Hearing -- -- -- WFL;for purposes of eval  -JR     Prior Level of Function-Communication -- -- -- unknown  -JR     Prior Level of Function-Swallowing -- -- -- no diet consistency restrictions  -JR     Plans/Goals Discussed with -- -- -- patient;other (see comments)  ELBA Olsen  -     Patient's Goals for Discharge -- -- -- return to PO diet  -     Family Goals for Discharge -- -- -- family did not state  -JR        Pain    Additional Documentation Pain Scale: FACES Pre/Post-Treatment (Group)  -JR Pain Scale: FACES Pre/Post-Treatment (Group)  -MB -- Pain Scale: FACES Pre/Post-Treatment (Group)  -JR        Pain Scale: FACES Pre/Post-Treatment    Pain: FACES Scale,  Pretreatment 0-->no hurt  -JR 2-->hurts little bit  -MB -- 2-->hurts little bit  -JR     Posttreatment Pain Rating 0-->no hurt  -JR -- -- 4-->hurts little more  -JR     Pain Location - Side/Orientation -- -- -- Bilateral  -JR     Pain Location -- -- -- lower  -JR     Pain Location - -- -- -- knee  -JR     Pre/Posttreatment Pain Comment -- When SLP raised HOB  -MB -- pt repositioned  -JR        Oral Motor Structure and Function    Dentition Assessment -- -- -- natural, present and adequate  -JR     Secretion Management -- -- -- WNL/WFL  -JR     Mucosal Quality -- -- -- dry  -JR     Gag Response -- -- -- WFL  -JR     Volitional Swallow -- -- -- weak  -JR     Volitional Cough -- -- -- WFL  -JR        Oral Musculature and Cranial Nerve Assessment    Oral Motor General Assessment -- -- -- generalized oral motor weakness  -JR        General Eating/Swallowing Observations    Respiratory Support Currently in Use -- -- -- nasal cannula  -JR     O2 Liters -- -- -- 3L  -JR     Eating/Swallowing Skills -- -- -- fed by SLP  -JR     Positioning During Eating -- -- -- upright in bed  -JR     Utensils Used -- -- -- spoon;straw  -JR     Consistencies Trialed -- -- -- pureed;thin liquids;regular textures  -JR        Clinical Swallow Eval    Oral Prep Phase -- -- -- impaired  -JR     Oral Transit -- -- -- impaired  -JR     Oral Residue -- -- -- WFL  -JR     Pharyngeal Phase -- -- -- no overt signs/symptoms of pharyngeal impairment  -JR     Esophageal Phase -- -- -- unremarkable  -JR        Oral Prep Concerns    Oral Prep Concerns -- -- -- poor rotary chew  -JR     Poor Rotary Chew -- -- -- regular consistencies  -JR        Oral Transit Concerns    Oral Transit Concerns -- -- -- delayed initiation of bolus transit  -JR     Delayed Intiation of Bolus Transit -- -- -- all consistencies  -JR        SLP Evaluation Clinical Impression    SLP Swallowing Diagnosis -- -- -- oral dysphagia;R/O pharyngeal dysphagia  -     Functional Impact --  -- -- risk of aspiration/pneumonia  -     Rehab Potential/Prognosis, Swallowing -- -- -- good, to achieve stated therapy goals  -     Swallow Criteria for Skilled Therapeutic Interventions Met -- -- -- demonstrates skilled criteria  -        SLP Treatment Clinical Impressions    Treatment Assessment (SLP) continued  -JR improved  -MB improved  -MB (r) KG (t) MB (c) --     Treatment Assessment Comments (SLP) see note  -JR Advance to regular solids, continue thin liquids  -MB -- --     Daily Summary of Progress (SLP) progress towards functional goals is fair  -JR progress towards functional goals is fair  -MB progress toward functional goals is good  -MB (r) KG (t) MB (c) --     Barriers to Overall Progress (SLP) Cognitive status  - Cognitive status  -MB Medically complex  -MB (r) KG (t) MB (c) --     Plan for Continued Treatment (SLP) continue treatment per plan of care  - goals adjusted to reflect functional improvements demonstrated  -MB continue treatment per plan of care  -MB (r) KG (t) MB (c) --     Care Plan Review evaluation/treatment results reviewed  - -- evaluation/treatment results reviewed;care plan/treatment goals reviewed;risks/benefits reviewed;current/potential barriers reviewed;patient/other agree to care plan  -MB (r) KG (t) MB (c) --        Recommendations    Therapy Frequency (Swallow) -- -- -- at least;3 days per week  -     Predicted Duration Therapy Intervention (Days) -- -- -- 1 week  -     SLP Diet Recommendation -- regular textures;thin liquids  -MB -- soft to chew textures;chopped;thin liquids  -     Recommended Diagnostics -- -- -- reassess via clinical swallow evaluation  -     Recommended Precautions and Strategies -- -- -- upright posture during/after eating;small bites of food and sips of liquid;alternate between small bites of food and sips of liquid;general aspiration precautions;reflux precautions  -     Oral Care Recommendations -- -- -- Oral Care BID/PRN  -      SLP Rec. for Method of Medication Administration -- -- -- meds whole;with puree;as tolerated  -JR     Monitor for Signs of Aspiration -- -- -- yes;notify SLP if any concerns  -JR        Swallow Goals (SLP)    Swallow LTGs Swallow Long Term Goal (free text)  -JR -- Swallow Long Term Goal (free text)  -MB (r) KG (t) MB (c) Swallow Long Term Goal (free text)  -JR     Swallow STGs diet tolerance goal selection (SLP)  -JR -- diet tolerance goal selection (SLP)  -MB (r) KG (t) MB (c) diet tolerance goal selection (SLP)  -JR     Diet Tolerance Goal Selection (SLP) Patient will tolerate trials of  -JR -- Patient will tolerate trials of  -MB (r) KG (t) MB (c) Patient will tolerate trials of  -JR        (LTG) Swallow    (LTG) Swallow Pt will tolerate LRD with no overt s/s of aspiration.  -JR Pt will tolerate LRD with no overt s/s of aspiration.  -MB Pt will tolerate LRD with no overt s/s of aspiration.  -MB (r) KG (t) MB (c) Pt will tolerate LRD with no overt s/s of aspiration.  -JR     Major (Swallow Long Term Goal) independently (over 90% accuracy)  -JR independently (over 90% accuracy)  -MB independently (over 90% accuracy)  -MB (r) KG (t) MB (c) independently (over 90% accuracy)  -JR     Time Frame (Swallow Long Term Goal) 1 week  -JR 1 week  -MB 1 week  -MB (r) KG (t) MB (c) 1 week  -JR     Barriers (Swallow Long Term Goal) medically complex  -JR medically complex  -MB medically complex  -MB (r) KG (t) MB (c) --     Progress/Outcomes (Swallow Long Term Goal) continuing progress toward goal  -JR continuing progress toward goal  -MB continuing progress toward goal  -MB (r) KG (t) MB (c) new goal  -JR     Comment (Swallow Long Term Goal) see note  -JR see note  -MB see note  -MB (r) KG (t) MB (c) --        (STG) Patient will tolerate trials of    Consistencies Trialed (Tolerate trials) soft to chew (chopped) textures;soft to chew (whole) textures;regular textures;thin liquids  -JR soft to chew (chopped)  textures;soft to chew (whole) textures;regular textures;thin liquids  -MB soft to chew (chopped) textures;soft to chew (whole) textures;regular textures;thin liquids  -MB (r) KG (t) MB (c) soft to chew (chopped) textures;soft to chew (whole) textures;regular textures;thin liquids  -JR     Desired Outcome (Tolerate trials) without signs/symptoms of aspiration  -JR without signs/symptoms of aspiration  -MB without signs/symptoms of aspiration  -MB (r) KG (t) MB (c) without signs/symptoms of aspiration  -JR     [place] (Tolerate trials) independently (over 90% accuracy)  -JR independently (over 90% accuracy)  -MB independently (over 90% accuracy)  -MB (r) KG (t) MB (c) independently (over 90% accuracy)  -JR     Time Frame (Tolerate trials) by discharge  -JR by discharge  -MB -- --     Progress/Outcomes (Tolerate trials) continuing progress toward goal  -JR continuing progress toward goal  -MB continuing progress toward goal  -MB (r) KG (t) MB (c) new goal  -JR     Comment (Tolerate trials) see note  -JR -- see note  -MB (r) KG (t) MB (c) --               User Key  (r) = Recorded By, (t) = Taken By, (c) = Cosigned By      Initials Name Effective Dates    Serena Gan, CCC-SLP 02/03/23 -     Wendy Jones, MS CCC-SLP 08/22/23 -     Wade Casillas, Speech Therapy Student 08/12/24 -                     EDUCATION  The patient has been educated in the following areas:   Dysphagia (Swallowing Impairment).        SLP GOALS       Row Name 10/09/24 0757 10/08/24 1430 10/07/24 0900       (LTG) Swallow    (LTG) Swallow Pt will tolerate LRD with no overt s/s of aspiration.  -JR Pt will tolerate LRD with no overt s/s of aspiration.  -MB Pt will tolerate LRD with no overt s/s of aspiration.  -MB (r) KG (t) MB (c)    [place] (Swallow Long Term Goal) independently (over 90% accuracy)  -JR independently (over 90% accuracy)  -MB independently (over 90% accuracy)  -MB (r) KG (t) MB (c)    Time Frame (Swallow Long  Term Goal) 1 week  -JR 1 week  -MB 1 week  -MB (r) KG (t) MB (c)    Barriers (Swallow Long Term Goal) medically complex  -JR medically complex  -MB medically complex  -MB (r) KG (t) MB (c)    Progress/Outcomes (Swallow Long Term Goal) continuing progress toward goal  -JR continuing progress toward goal  -MB continuing progress toward goal  -MB (r) KG (t) MB (c)    Comment (Swallow Long Term Goal) see note  -JR see note  -MB see note  -MB (r) KG (t) MB (c)       (STG) Patient will tolerate trials of    Consistencies Trialed (Tolerate trials) soft to chew (chopped) textures;soft to chew (whole) textures;regular textures;thin liquids  -JR soft to chew (chopped) textures;soft to chew (whole) textures;regular textures;thin liquids  -MB soft to chew (chopped) textures;soft to chew (whole) textures;regular textures;thin liquids  -MB (r) KG (t) MB (c)    Desired Outcome (Tolerate trials) without signs/symptoms of aspiration  -JR without signs/symptoms of aspiration  -MB without signs/symptoms of aspiration  -MB (r) KG (t) MB (c)    [place] (Tolerate trials) independently (over 90% accuracy)  -JR independently (over 90% accuracy)  -MB independently (over 90% accuracy)  -MB (r) KG (t) MB (c)    Time Frame (Tolerate trials) by discharge  -JR by discharge  -MB --    Progress/Outcomes (Tolerate trials) continuing progress toward goal  -JR continuing progress toward goal  -MB continuing progress toward goal  -MB (r) KG (t) MB (c)    Comment (Tolerate trials) see note  -JR -- see note  -MB (r) KG (t) MB (c)      Row Name 10/06/24 1509             (LTG) Swallow    (LTG) Swallow Pt will tolerate LRD with no overt s/s of aspiration.  -JR      [place] (Swallow Long Term Goal) independently (over 90% accuracy)  -JR      Time Frame (Swallow Long Term Goal) 1 week  -JR      Progress/Outcomes (Swallow Long Term Goal) new goal  -JR         (STG) Patient will tolerate trials of    Consistencies Trialed (Tolerate trials) soft to  chew (chopped) textures;soft to chew (whole) textures;regular textures;thin liquids  -JR      Desired Outcome (Tolerate trials) without signs/symptoms of aspiration  -JR      [place] (Tolerate trials) independently (over 90% accuracy)  -JR      Progress/Outcomes (Tolerate trials) new goal  -JR                User Key  (r) = Recorded By, (t) = Taken By, (c) = Cosigned By      Initials Name Provider Type    Serena Gan, CCC-SLP Speech and Language Pathologist    Wendy Jones MS CCC-SLP Speech and Language Pathologist    Wade Casillas, Speech Therapy Student SLP Student                         Time Calculation:    Time Calculation- SLP       Row Name 10/09/24 1010             Time Calculation- SLP    SLP Start Time 0757  -JR      SLP Stop Time 0836  -      SLP Time Calculation (min) 39 min  -      SLP Received On 10/09/24  -JR         Untimed Charges    64059-[place] Treatment Swallow Minutes 39  -JR         Total Minutes    Untimed Charges Total Minutes 39  -JR       Total Minutes 39  -JR                User Key  (r) = Recorded By, (t) = Taken By, (c) = Cosigned By      Initials Name Provider Type    Wendy Jones MS CCC-SLP Speech and Language Pathologist                    Therapy Charges for Today       Code Description Service Date Service Provider Modifiers Qty    48892366797 HC ST TREATMENT SWALLOW 3 10/9/2024 Wendy Sosa MS CCC-SLP GN 1                 Wendy Sosa MS CCC-PACO  10/9/2024

## 2024-10-09 NOTE — PROGRESS NOTES
LOS: 4 days   Patient Care Team:  Provider, No Known as PCP - General  [facility]  Eleazar Ramires MD as Cardiologist (Cardiology)  Shasta Madrigal MD as Referring Physician (Obstetrics and Gynecology)  Holly Chavez MD as Consulting Physician (General Surgery)    Chief Complaint:  hydronephrosis, incomplete emptying of bladder    Subjective     Interval History:     Tolerating Smith.     Review of Systems  Pertinent items are noted in HPI, all other systems reviewed and negative     Objective     Vital Signs  Temp:  [97.6 °F (36.4 °C)-98.4 °F (36.9 °C)] 98.4 °F (36.9 °C)  Heart Rate:  [75-96] 87  Resp:  [15-16] 15  BP: (127-165)/(51-70) 165/58    Physical Exam:  Smith in place; draining clear yellow urine.      Data Review:       I have reviewed the following data:    Urine Culture - Urine, Urine, Catheter (10/05/2024 02:08)    Renal Function Panel (10/09/2024 04:39)    CBC (No Diff) (10/09/2024 04:39)    Medication Review:     Current Facility-Administered Medications:     acetaminophen (TYLENOL) tablet 650 mg, 650 mg, Oral, Q4H PRN, 650 mg at 10/07/24 1056 **OR** acetaminophen (TYLENOL) 160 MG/5ML oral solution 650 mg, 650 mg, Oral, Q4H PRN, 650 mg at 10/06/24 1232 **OR** acetaminophen (TYLENOL) suppository 650 mg, 650 mg, Rectal, Q4H PRN, Cuba Espitia MD    atorvastatin (LIPITOR) tablet 40 mg, 40 mg, Oral, Daily, Quentin Abernathy, DO, 40 mg at 10/09/24 0850    sennosides-docusate (PERICOLACE) 8.6-50 MG per tablet 2 tablet, 2 tablet, Oral, BID PRN, 2 tablet at 10/07/24 1505 **AND** [DISCONTINUED] polyethylene glycol (MIRALAX) packet 17 g, 17 g, Oral, Daily PRN **AND** bisacodyl (DULCOLAX) EC tablet 5 mg, 5 mg, Oral, Daily PRN **AND** bisacodyl (DULCOLAX) suppository 10 mg, 10 mg, Rectal, Daily PRN, Cuba Espitia MD    carvedilol (COREG) tablet 12.5 mg, 12.5 mg, Oral, Q12H, Zeinab Trujillo MD, 12.5 mg at 10/09/24 0851    cefTRIAXone (ROCEPHIN) 2,000 mg in sodium chloride  0.9 % 100 mL MBP, 2,000 mg, Intravenous, Q24H, Quentin Abernathy DO, Last Rate: 200 mL/hr at 10/09/24 0850, 2,000 mg at 10/09/24 0850    cholecalciferol (VITAMIN D3) tablet 800 Units, 800 Units, Oral, Daily, Quentin Abernathy, DO, 800 Units at 10/09/24 0852    donepezil (ARICEPT) tablet 10 mg, 10 mg, Oral, Nightly, Quentin Abernathy, DO, 10 mg at 10/07/24 2050    heparin (porcine) 5000 UNIT/ML injection 5,000 Units, 5,000 Units, Subcutaneous, Q8H, Quentin Abernathy DO, 5,000 Units at 10/08/24 1415    lactulose solution 20 g, 20 g, Oral, Daily, Quentin Abernathy DO, 20 g at 10/09/24 0852    levothyroxine (SYNTHROID, LEVOTHROID) tablet 50 mcg, 50 mcg, Oral, Q AM, Quentin Abernathy DO, 50 mcg at 10/08/24 0604    melatonin tablet 10 mg, 10 mg, Oral, Nightly PRN, Han Gibbs, APRN, 10 mg at 10/07/24 2050    oxyCODONE (ROXICODONE) immediate release tablet 10 mg, 10 mg, Oral, Q4H PRN, Quentin Abernathy DO, 10 mg at 10/09/24 0334    pantoprazole (PROTONIX) EC tablet 40 mg, 40 mg, Oral, Q AM, Quentin Abernathy DO, 40 mg at 10/08/24 1233    polyethylene glycol (MIRALAX) packet 17 g, 17 g, Oral, BID, Quentin Abernathy DO, 17 g at 10/09/24 0851    sertraline (ZOLOFT) tablet 25 mg, 25 mg, Oral, Daily, Quentin Abernathy DO, 25 mg at 10/09/24 0851    sodium chloride 0.9 % flush 10 mL, 10 mL, Intravenous, PRN, Ta Castillo MD    sodium chloride 0.9 % flush 10 mL, 10 mL, Intravenous, Q12H, Cuba Espitia MD, 10 mL at 10/09/24 0853    sodium chloride 0.9 % flush 10 mL, 10 mL, Intravenous, PRN, Cuba Espitia MD    Assessment and Plan:    Bilateral hydronephrosis and incomplete emptying of bladder: Creatinine improving. Repeat CT tomorrow - ordered.     URO DISPO: I will assess the patient again tomorrow.     I discussed the patient's findings and my recommendations with patient    (Please note that portions of this note were  completed with a voice recognition program.)    Moiz Gallegos MD  10/09/24  09:44 CDT    Time: Time spent: 15 minutes spent performing evaluation and management, chart review, and discussion with patient, > 50% of time spent in face-to-face encounter

## 2024-10-09 NOTE — PROGRESS NOTES
[facility]    Palliative Care Daily Progress Note   Chief complaint-follow up support for patient/family    Code Status:   Code Status and Medical Interventions: CPR (Attempt to Resuscitate); Full Support   Ordered at: 10/07/24 1320     Code Status (Patient has no pulse and is not breathing):    CPR (Attempt to Resuscitate)     Medical Interventions (Patient has pulse or is breathing):    Full Support      Advanced Directives: Advance Directive Status: Patient does not have advance directive   Goals of Care: Ongoing.     S: Medical record reviewed. Events noted.  Urology plans for repeat CT 10/10 to reassess hydronephrosis.  Kidney function improving.  Hemoglobin slightly decreased compared to yesterday but stable.  Blood cultures from 10/7 pending.  Therapy notes increase confusion recommends SNF.  Required 15 mg oral morphine equivalent in the form of oxycodone over the last 24 hours. Sitting up on side of bed eating lunch, poor appetite. Confusion. Without distress. Daughter at bedside, updated on plan of care.     Review of Systems   Constitutional: Positive for decreased appetite and malaise/fatigue.   Respiratory:  Negative for shortness of breath.    Musculoskeletal:  Positive for myalgias.   Genitourinary:  Positive for incomplete emptying.        Smith catheter placed   Neurological:  Positive for weakness.   Psychiatric/Behavioral:  Positive for altered mental status.      Pain Assessment  Preferred Pain Scale: number (Numeric Rating Pain Scale)  Nonverbal Indicators of Pain: grimace  CPOT Facial Expression: 0-->relaxed, neutral  CPOT Body Movements: 0-->absence of movements  CPOT Muscle Tension: 0-->relaxed  Ventilator Compliance/Vocalization: 0-->talking in normal tone or no sound  CPOT Score: 0  Pain Location: knee  Pain Description: aching, sore    O:     Intake/Output Summary (Last 24 hours) at 10/9/2024 1139  Last data filed at 10/9/2024 1010  Gross  per 24 hour   Intake 240 ml   Output 2300 ml   Net -2060 ml       Diagnostics and current medications: Reviewed.      Current Facility-Administered Medications:     acetaminophen (TYLENOL) tablet 650 mg, 650 mg, Oral, Q4H PRN, 650 mg at 10/07/24 1056 **OR** acetaminophen (TYLENOL) 160 MG/5ML oral solution 650 mg, 650 mg, Oral, Q4H PRN, 650 mg at 10/06/24 1232 **OR** acetaminophen (TYLENOL) suppository 650 mg, 650 mg, Rectal, Q4H PRN, Cuba Espitia MD    atorvastatin (LIPITOR) tablet 40 mg, 40 mg, Oral, Daily, Quentin Abernathy DO, 40 mg at 10/09/24 0850    sennosides-docusate (PERICOLACE) 8.6-50 MG per tablet 2 tablet, 2 tablet, Oral, BID PRN, 2 tablet at 10/07/24 1505 **AND** [DISCONTINUED] polyethylene glycol (MIRALAX) packet 17 g, 17 g, Oral, Daily PRN **AND** bisacodyl (DULCOLAX) EC tablet 5 mg, 5 mg, Oral, Daily PRN **AND** bisacodyl (DULCOLAX) suppository 10 mg, 10 mg, Rectal, Daily PRN, Cuba Espitia MD    carvedilol (COREG) tablet 12.5 mg, 12.5 mg, Oral, Q12H, Zeinab Trujillo MD, 12.5 mg at 10/09/24 0851    cefTRIAXone (ROCEPHIN) 2,000 mg in sodium chloride 0.9 % 100 mL MBP, 2,000 mg, Intravenous, Q24H, Quentin Abernathy DO, Last Rate: 200 mL/hr at 10/09/24 0850, 2,000 mg at 10/09/24 0850    cholecalciferol (VITAMIN D3) tablet 800 Units, 800 Units, Oral, Daily, Quentin Abernathy DO, 800 Units at 10/09/24 0852    donepezil (ARICEPT) tablet 10 mg, 10 mg, Oral, Nightly, Quentin Abernathy DO, 10 mg at 10/07/24 2050    heparin (porcine) 5000 UNIT/ML injection 5,000 Units, 5,000 Units, Subcutaneous, Q8H, Quentin Abernathy DO, 5,000 Units at 10/08/24 1415    lactulose solution 20 g, 20 g, Oral, Daily, Quentin Abernathy DO, 20 g at 10/09/24 0852    levothyroxine (SYNTHROID, LEVOTHROID) tablet 50 mcg, 50 mcg, Oral, Q AM, Quentin Abernathy DO, 50 mcg at 10/08/24 0604    melatonin tablet 10 mg, 10 mg, Oral, Nightly PRN, Han Gibbs, APRN, 10  "mg at 10/07/24 2050    oxyCODONE (ROXICODONE) immediate release tablet 10 mg, 10 mg, Oral, Q4H PRN, Quentin Abernathy DO, 10 mg at 10/09/24 0334    pantoprazole (PROTONIX) EC tablet 40 mg, 40 mg, Oral, Q AM, Quentin Abernathy DO, 40 mg at 10/08/24 1233    polyethylene glycol (MIRALAX) packet 17 g, 17 g, Oral, BID, Quentin Abernathy DO, 17 g at 10/09/24 0851    sertraline (ZOLOFT) tablet 25 mg, 25 mg, Oral, Daily, Quentin Abernathy DO, 25 mg at 10/09/24 0851    sodium chloride 0.9 % flush 10 mL, 10 mL, Intravenous, PRN, Ta Castillo MD    sodium chloride 0.9 % flush 10 mL, 10 mL, Intravenous, Q12H, Cuba Espitia MD, 10 mL at 10/09/24 0853    sodium chloride 0.9 % flush 10 mL, 10 mL, Intravenous, PRN, Cuba Espitia MD    Allergies   Allergen Reactions    Ropinirole Hcl Shortness Of Breath    Codeine Itching and Mental Status Change    Definity [Perflutren Lipid Microsphere] Other (See Comments)     Severe back pain    Ambien [Zolpidem] Other (See Comments)     HYPER     Eszopiclone Other (See Comments)     MAKES PT HYPER     Pregabalin Dizziness    Tizanidine Other (See Comments)     Terrible nightmares     I have utilized all available immediate resources to obtain, update, or review the patient's current medications (including all prescriptions, over-the-counter products, herbals, cannabis/cannabidiol products, and vitamin/mineral/dietary (nutritional) supplements) for name, route of administration, type, dose and frequency.    A:    /49 (BP Location: Right arm, Patient Position: Lying)   Pulse 83   Temp 99 °F (37.2 °C) (Oral)   Resp 14   Ht 157.5 cm (62\")   Wt 85.9 kg (189 lb 4.8 oz)   LMP  (LMP Unknown)   SpO2 95%   BMI 34.62 kg/m²     Vitals and nursing note reviewed.   Constitutional:       Appearance: Not in distress. Ill-appearing and chronically ill-appearing.      Interventions: Nasal cannula in place.   Eyes:      General: Lids are normal. "   HENT:      Head: Normocephalic.   Pulmonary:      Effort: Pulmonary effort is normal.   Cardiovascular:      Normal rate.   Edema:     Peripheral edema absent.   Musculoskeletal: Normal range of motion.      Cervical back: Neck supple.   Skin:     General: Skin is warm.   Genitourinary:     Comments: Smith catheter in place  Neurological:      Mental Status: confusion to situation  Psychiatric:         Mood and Affect: appears calm     Patient status: Disease state: Controlled with current treatments.  Current Functional status: Palliative Performance Scale Score: Performance 30% based on the following measures: Ambulation: Totally bed bound, Activity and Evidence of Disease: Unable to do any work, extensive evidence of disease, Self-Care: Total care required,  Intake: Reduced, LOC: Full, drowsy or confusion   Baseline Functional status: Palliative Performance Scale Score: Performance 70% based on the following measures: Ambulation: Reduced, Activity and Evidence of Disease: Unable to do normal work, some evidence of disease, Self-Care: Fully independent,  Intake: Normal or reduced, LOC: Full   Baseline ECOG Status(2) Ambulatory and capable of self care, unable to carry out work activity, up and about > 50% or waking hours.  Nutritional status: Albumin 2.3 Body mass index is 32.08 kg/m².      Hospital Problem List      Sepsis    Sepsis due to urinary tract infection     Impression/Problem List:     Cardiopulmonary arrest  Alzheimer's dementia  Sepsis due to bacteremia-E. 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 Garcia  Depression  Hypothyroidism  Fibromyalgia  GERD  Hyperlipidemia  Hypertension  Incontinence  Restless leg syndrome  Obstructive sleep apnea-not using CPAP  Lumbar stenosis  Migraines  Peptic ulcer   Advanced  age     Recommendations/Plan:  1. plan: Goals of care include CODE STATUS CPR/full support interventions.     Family support: The patient receives support from her  and daughter..  Advance Directives: Not on file     POA/Healthcare surrogate-spouse and daughter-next of kin.     2.  Palliative care encounter  - Prognosis is guarded long-term secondary to cardiopulmonary arrest, Alzheimer's dementia, sepsis, acute kidney injury, adenocarcinoma of endometrium, multiple comorbidities, and advanced age.  -Patient and family appears to have fair prognostic awareness.      -Apparently patient admitted as DNR/DO NOT INTUBATE.  Transferred to CT and experience cardiopulmonary arrest and a CODE BLUE was called and received CPR x 5-10 minutes per report.  Placed in code chill protocol at 0501.  Admitted to critical care unit on ventilator support with sepsis.  Started on IV fluids and IV antibiotics.  Hypothermia protocol discontinued.  Acute kidney injury felt likely multifactorial.    10/6-unit of PRBC 10/6.    -Extubated to nasal cannula.    -Evaluated by speech therapy due to mild oral dysphagia and started on thin liquid soft to chew solid with chopped meat diet.    -Therapy recommends SNF.   -Anticipating hospice at discharge per intensivist.   10/7-Urology consulted due to bilateral hydronephrosis and UTI, recommends following to determine bladder emptying, bowel management, and broad-spectrum antibiotics x 14 days total for complicated UTI.    10/8-Due to postvoid residuals greater than 200 a Smiht catheter has been replaced today and plans for repeat CT in 48 hours.     10/7-clarified CODE STATUS CPR/full support interventions    10/9-continue supportive measures  -Urology following, a Smith catheter placed due to incomplete emptying. Plan for repeat CT 10/10  -daughter updated on plan of care  -Anticipating home with home health.  Will need PCP prior to discharge, electronic communication to .  -At risk for  rehospitalization's and decline given multiple comorbid factors and cardiopulmonary arrest event      Thank you for allowing us to participate in patient's plan of care. Palliative Care Team will continue to follow patient.     Jonaa Rush, APRN  10/9/2024  11:39 CDT

## 2024-10-09 NOTE — PLAN OF CARE
Goal Outcome Evaluation:  Plan of Care Reviewed With: patient        Progress: no change  Outcome Evaluation: Pt A&OX1. VSS on RA. Tele on.No C/O pain reported. IV to Lt and Rt FA IID. Voiding via F/C. Call light within reach, safety maintained.

## 2024-10-09 NOTE — PLAN OF CARE
"Goal Outcome Evaluation:  Plan of Care Reviewed With: patient        Progress: no change  Outcome Evaluation: Performed bicep curls and wrist flex/ext with 2# wand. Pt unable to tolerate shoulder movement with weight (LUE) performed A-AAROM LUE, AROM RUE in shoulder planes x10 reps each ther ex for increased fxl strength/endurance to ADLs/transfers. Cues to remain on task, pt needed encouraged throughout to continue tasks. Pt washed face s/u RUE and applied chapstick. Offered further ADLs for grooming and hygiene but pt declined reporting \"I'm done, it hurts\" pt unable to state location of pain or any efforts KOEHLER/L could do to assist with pain. Assisted repositioning modA for UB in bed in attempt for further comfort. Pt declined further activity. Recommend SNF.      Anticipated Discharge Disposition (OT): skilled nursing facility                        "

## 2024-10-09 NOTE — CASE MANAGEMENT/SOCIAL WORK
Continued Stay Note   [place]     Patient Name: Jennifer Gomes  MRN: [id number]  Today's Date: 10/9/2024    Admit Date: 10/5/2024    Plan: Home   Discharge Plan       Row Name 10/09/24 1552       Plan    Plan Home    Plan Comments SW spoke with patient's daughter to revisit dc plan.  Daughter continues to state patient will return home upon discharge.  SW advised that they would need to establish with a new PCP and once seen and established by new provider they would need to order HH services.  HH services cannot be ordered prior to discharge as patient does not have a PCP to follow services.  Daughter advised she will work on establishing patient with a new PCP.                   Discharge Codes    No documentation.                       JEFRY Wood

## 2024-10-09 NOTE — PLAN OF CARE
Goal Outcome Evaluation:  Plan of Care Reviewed With: patient        Progress: no change  Outcome Evaluation: see note                    Treatment Assessment (SLP): continued (10/09/24 0757)  Treatment Assessment Comments (SLP): see note (10/09/24 0757)  Plan for Continued Treatment (SLP): continue treatment per plan of care (10/09/24 0757)

## 2024-10-09 NOTE — PLAN OF CARE
Goal Outcome Evaluation: medicated x 1 for c/o right knee pain. Fc dcd per orders today. Voiding incont and without difficulty. Bmx2 today. Aaox4 this shift with intermittent confusion noted. Hx of dementia. Safety maintaiend. Dc plan is home with home health per daughter

## 2024-10-09 NOTE — THERAPY TREATMENT NOTE
Acute Care - Physical Therapy Treatment Note  [facility]     Patient Name: Jennifer Gomes  : 1940  MRN: [id number]  Today's Date: 10/9/2024      Visit Dx:     ICD-10-CM ICD-9-CM   1. Sepsis without acute organ dysfunction, due to unspecified organism  A41.9 038.9     995.91   2. Acute cystitis without hematuria  N30.00 595.0   3. Gastroenteritis  K52.9 558.9   4. Acute metabolic encephalopathy  G93.41 348.31   5. Acute respiratory failure with hypoxia  J96.01 518.81   6. Dysphagia, unspecified type [R13.10]  R13.10 787.20   7. Impaired mobility [Z74.09]  Z74.09 799.89     Patient Active Problem List   Diagnosis    Gastroesophageal reflux disease    Spinal stenosis, lumbar region, without neurogenic claudication    Overweight    Non-smoker    Erosion of vaginal mesh    Adenocarcinoma of endometrium    Encounter for consultation    S/P hysterectomy with oophorectomy    Encounter for follow-up surveillance of endometrial cancer    History of radiation therapy    Obesity (BMI 30-39.9)    Non-traumatic rhabdomyolysis    Metabolic encephalopathy    Acute renal failure superimposed on stage 3 chronic kidney disease    Acute respiratory failure with hypoxia and hypercapnia    Medical non-compliance, does not take narcotics as prescribed    SIRS (systemic inflammatory response syndrome)    Chronic constipation    Chronic pain syndrome    Chronic prescription opiate use    Anemia    Hypothyroidism (acquired)    Essential hypertension    TOMÁS (acute kidney injury)    Venous insufficiency of both lower extremities    Fluid retention    Low blood pressure reading    Obesity, Class III, BMI 40-49.9 (morbid obesity)    Chronic intractable headache    RAFAL (obstructive sleep apnea)    Change in bowel habits    Acute encephalopathy due to UTI    Toxic metabolic encephalopathy    Polypharmacy    Frequent falls    Grade III internal hemorrhoids    External hemorrhoids    E. coli UTI    Colitis    Moderate malnutrition     Transaminitis    Acute cholecystitis    Acute UTI (urinary tract infection)    Polypharmacy    Altered mental status    UTI (urinary tract infection)    Bacteremia due to Enterococcus    Dementia    Hypokalemia    Sepsis    Sepsis due to urinary tract infection     Past Medical History:   Diagnosis Date    Anxiety     Arthritis     Bronchitis     Cancer     uterine    Chronic pain     Depression     Disease of thyroid gland     Fibromyalgia     GERD (gastroesophageal reflux disease)     Headache     History of transfusion     AS     Hyperlipidemia     Hypertension     Incontinence     Insomnia     Leg pain     Lumbar stenosis     Migraines     Peptic ulcer     Restless legs     Sleep apnea     NO C-PAP    UTI (urinary tract infection)     Vaginal bleeding      Past Surgical History:   Procedure Laterality Date    BLADDER REPAIR      MESH HAD TO BE REMOVED IN 2013    BREAST BIOPSY Right 2017    benign    BREAST CYST EXCISION Left     CARDIAC CATHETERIZATION      CARPAL TUNNEL RELEASE      CATARACT EXTRACTION W/ INTRAOCULAR LENS  IMPLANT, BILATERAL      CHOLECYSTECTOMY WITH INTRAOPERATIVE CHOLANGIOGRAM N/A 2023    Procedure: CHOLECYSTECTOMY LAPAROSCOPIC INTRAOPERATIVE CHOLANGIOGRAM;  Surgeon: Gerardo Arciniega MD;  Location:  [facility] OR;  Service: General;  Laterality: N/A;    COLONOSCOPY      COLONOSCOPY N/A 10/01/2021    Procedure: COLONOSCOPY WITH ANESTHESIA;  Surgeon: Tom Velasco DO;  Location:  [facility]COPY;  Service: Gastroenterology;  Laterality: N/A;  pre: change in bowel habits  post: diverticulosis. hemorrhoids.   Olivia Mora APRN        CYSTECTOMY      D & C HYSTEROSCOPY N/A 2017    Procedure: DILATATION AND CURETTAGE HYSTEROSCOPY;  Surgeon: Shasta Madrigal MD;  Location: [facility] OR;  Service:     DILATION AND CURETTAGE, DIAGNOSTIC / THERAPEUTIC      ENDOSCOPY  2010    Short segment of Arriola's,Moderate chroninc esophagogastritis and negative H.pylori     ENDOSCOPY N/A 09/25/2017    Procedure: ESOPHAGOGASTRODUODENOSCOPY WITH ANESTHESIA;  Surgeon: Tom Velasco DO;  Location:  [facility];  Service:     EYE SURGERY      RETINA    HEMORRHOIDECTOMY SIGMOIDOSCOPY N/A 3/21/2023    Procedure: HEMORRHOIDECTOMY WITH EXAM UNDER ANESTHESIA;  Surgeon: Holly Chavez MD;  Location:  [facility] OR;  Service: General;  Laterality: N/A;    HYSTERECTOMY  12/20/2017    ORIF TIBIA/FIBULA FRACTURES Left 2000    TRANSVAGINAL TAPING SUSPENSION N/A 11/06/2017    Procedure: VAGINAL MESH REVISION;  Surgeon: Shasta Madrigal MD;  Location:  [facility] OR;  Service:     VAGINAL MESH REVISION  2013     PT Assessment (Last 12 Hours)       PT Evaluation and Treatment       Row Name 10/09/24 1009          Physical Therapy Time and Intention    Subjective Information complains of;weakness;pain  -KJ     Document Type therapy note (daily note)  -KJ     Mode of Treatment physical therapy  -KJ     Patient Effort adequate  -KJ       Row Name 10/09/24 1009          General Information    Existing Precautions/Restrictions fall  -KJ       Row Name 10/09/24 1009          Pain    Pretreatment Pain Rating 6/10  -KJ     Posttreatment Pain Rating 8/10  -KJ     Pain Location - Side/Orientation Bilateral  -KJ     Pain Location - knee  -KJ       Row Name 10/09/24 1009          Bed Mobility    Supine-Sit [place] (Bed Mobility) verbal cues;minimum assist (75% patient effort)  -KJ     Sit-Supine [place] (Bed Mobility) verbal cues;moderate assist (50% patient effort)  -KJ       Row Name 10/09/24 1009          Sit-Stand Transfer    Sit-Stand [place] (Transfers) verbal cues;moderate assist (50% patient effort)  -KJ     Assistive Device (Sit-Stand Transfers) walker, front-wheeled  -KJ     Comment, (Sit-Stand Transfer) attempted but pt would not put much effort into standing; then said she was done  -KJ       Row Name 10/09/24 1009          Balance    Balance Assessment sitting static balance  -KJ      Static Sitting Balance standby assist  -KJ     Dynamic Sitting Balance contact guard  -KJ     Position, Sitting Balance sitting edge of bed  -KJ       Row Name 10/09/24 1009          Motor Skills    Therapeutic Exercise aerobic  -KJ       Row Name 10/09/24 1009          Aerobic Exercise    Comment, Aerobic Exercise (Therapeutic Exercise) A/AAROM BLE  -KJ       Row Name 10/09/24 1009          Positioning and Restraints    Pre-Treatment Position in bed  -KJ     Post Treatment Position bed  -KJ     In Bed call light within reach  -KJ               User Key  (r) = Recorded By, (t) = Taken By, (c) = Cosigned By      Initials Name Provider Type    Yasemin Amezcua, PTA Physical Therapist Assistant                    Physical Therapy Education       Title: PT OT SLP Therapies (In Progress)       Topic: Physical Therapy (In Progress)       Point: Mobility training (In Progress)       Learning Progress Summary             Patient Acceptance, E, NR by KAVITA at 10/7/2024 1158    Comment: role of PT, call for assist, d/c planning, high fall risk                         Point: Home exercise program (In Progress)       Learning Progress Summary             Patient Acceptance, E, NR by KAVITA at 10/7/2024 1158    Comment: role of PT, call for assist, d/c planning, high fall risk                         Point: Body mechanics (In Progress)       Learning Progress Summary             Patient Acceptance, E, NR by KAVITA at 10/7/2024 1158    Comment: role of PT, call for assist, d/c planning, high fall risk                         Point: Precautions (In Progress)       Learning Progress Summary             Patient Acceptance, E, NR by KAVITA at 10/7/2024 1158    Comment: role of PT, call for assist, d/c planning, high fall risk                                         User Key       Initials Effective Dates Name Provider Type Luis WALLS 08/15/24 -  Sunny Esquivel, PT DPT Physical Therapist PT                  PT Recommendation and Plan     Plan  of Care Reviewed With: patient  Progress: improving  Outcome Evaluation: PT tx completed. Pt c/o B knee/back pn. Yelled out with movement of LE's. Slowly performed AAROM in supine to BLE and this seemed to help. Bed mobility Min/ModA. Sit edge of bed x 20 minutes S. Attempted to stand, but pt did not put much effort into it then said she was done. Recommend SNF   Outcome Measures       Row Name 10/08/24 0916             How much help from another person do you currently need...    Turning from your back to your side while in flat bed without using bedrails? 2  -MF      Moving from lying on back to sitting on the side of a flat bed without bedrails? 2  -MF      Moving to and from a bed to a chair (including a wheelchair)? 1  -MF      Standing up from a chair using your arms (e.g., wheelchair, bedside chair)? 2  -MF      Climbing 3-5 steps with a railing? 1  -MF      To walk in hospital room? 1  -MF      AM-PAC 6 Clicks Score (PT) 9  -MF      Highest Level of Mobility Goal 3 --> Sit at edge of bed  -MF         Functional Assessment    Outcome Measure Options AM-PAC 6 Clicks Basic Mobility (PT)  -MF                User Key  (r) = Recorded By, (t) = Taken By, (c) = Cosigned By      Initials Name Provider Type    Marisol Juarez PTA Physical Therapist Assistant                     Time Calculation:    PT Charges       Row Name 10/09/24 1056             Time Calculation    Start Time 1009  -KJ      Stop Time 1047  -KJ      Time Calculation (min) 38 min  -KJ      PT Received On 10/09/24  -KJ      PT Goal Re-Cert Due Date 10/17/24  -KJ         Time Calculation- PT    Total Timed Code Minutes- PT 38 minute(s)  -KJ                User Key  (r) = Recorded By, (t) = Taken By, (c) = Cosigned By      Initials Name Provider Type    Yasemin Amezcua, CHANDLER Physical Therapist Assistant                  Therapy Charges for Today       Code Description Service Date Service Provider Modifiers Qty    [id number]  PT THER PROC  EA 15 MIN 10/9/2024 Yasemin Puri, PTA GP 1    [id number] HC PT THERAPEUTIC ACT EA 15 MIN 10/9/2024 Yasemin Puri, PTA GP 2            PT G-Codes  Outcome Measure Options: AM-PAC 6 Clicks Basic Mobility (PT)  AM-PAC 6 Clicks Score (PT): 9  AM-PAC 6 Clicks Score (OT): 11    Yasemin Puri PTA  10/9/2024

## 2024-10-09 NOTE — PLAN OF CARE
Goal Outcome Evaluation:  Plan of Care Reviewed With: patient        Progress: improving  Outcome Evaluation: PT tx completed. Pt c/o B knee/back pn. Yelled out with movement of LE's. Slowly performed AAROM in supine to BLE and this seemed to help. Bed mobility Min/ModA. Sit edge of bed x 20 minutes S. Attempted to stand, but pt did not put much effort into it then said she was done. Recommend SNF

## 2024-10-09 NOTE — THERAPY TREATMENT NOTE
Patient Name: Jennifer Gomes  : 1940    MRN: [id number]                              Today's Date: 10/9/2024       Admit Date: 10/5/2024    Visit Dx:     ICD-10-CM ICD-9-CM   1. Sepsis without acute organ dysfunction, due to unspecified organism  A41.9 038.9     995.91   2. Acute cystitis without hematuria  N30.00 595.0   3. Gastroenteritis  K52.9 558.9   4. Acute metabolic encephalopathy  G93.41 348.31   5. Acute respiratory failure with hypoxia  J96.01 518.81   6. Dysphagia, unspecified type [R13.10]  R13.10 787.20   7. Impaired mobility [Z74.09]  Z74.09 799.89     Patient Active Problem List   Diagnosis    Gastroesophageal reflux disease    Spinal stenosis, lumbar region, without neurogenic claudication    Overweight    Non-smoker    Erosion of vaginal mesh    Adenocarcinoma of endometrium    Encounter for consultation    S/P hysterectomy with oophorectomy    Encounter for follow-up surveillance of endometrial cancer    History of radiation therapy    Obesity (BMI 30-39.9)    Non-traumatic rhabdomyolysis    Metabolic encephalopathy    Acute renal failure superimposed on stage 3 chronic kidney disease    Acute respiratory failure with hypoxia and hypercapnia    Medical non-compliance, does not take narcotics as prescribed    SIRS (systemic inflammatory response syndrome)    Chronic constipation    Chronic pain syndrome    Chronic prescription opiate use    Anemia    Hypothyroidism (acquired)    Essential hypertension    TOMÁS (acute kidney injury)    Venous insufficiency of both lower extremities    Fluid retention    Low blood pressure reading    Obesity, Class III, BMI 40-49.9 (morbid obesity)    Chronic intractable headache    RAFAL (obstructive sleep apnea)    Change in bowel habits    Acute encephalopathy due to UTI    Toxic metabolic encephalopathy    Polypharmacy    Frequent falls    Grade III internal hemorrhoids    External hemorrhoids    E. coli UTI    Colitis    Moderate malnutrition     Transaminitis    Acute cholecystitis    Acute UTI (urinary tract infection)    Polypharmacy    Altered mental status    UTI (urinary tract infection)    Bacteremia due to Enterococcus    Dementia    Hypokalemia    Sepsis    Sepsis due to urinary tract infection     Past Medical History:   Diagnosis Date    Anxiety     Arthritis     Bronchitis     Cancer     uterine    Chronic pain     Depression     Disease of thyroid gland     Fibromyalgia     GERD (gastroesophageal reflux disease)     Headache     History of transfusion     AS     Hyperlipidemia     Hypertension     Incontinence     Insomnia     Leg pain     Lumbar stenosis     Migraines     Peptic ulcer     Restless legs     Sleep apnea     NO C-PAP    UTI (urinary tract infection)     Vaginal bleeding      Past Surgical History:   Procedure Laterality Date    BLADDER REPAIR      MESH HAD TO BE REMOVED IN 2013    BREAST BIOPSY Right 2017    benign    BREAST CYST EXCISION Left     CARDIAC CATHETERIZATION      CARPAL TUNNEL RELEASE      CATARACT EXTRACTION W/ INTRAOCULAR LENS  IMPLANT, BILATERAL      CHOLECYSTECTOMY WITH INTRAOPERATIVE CHOLANGIOGRAM N/A 2023    Procedure: CHOLECYSTECTOMY LAPAROSCOPIC INTRAOPERATIVE CHOLANGIOGRAM;  Surgeon: Gerardo Arciniega MD;  Location:  [facility] OR;  Service: General;  Laterality: N/A;    COLONOSCOPY      COLONOSCOPY N/A 10/01/2021    Procedure: COLONOSCOPY WITH ANESTHESIA;  Surgeon: Tom Velasco DO;  Location:  [facility];  Service: Gastroenterology;  Laterality: N/A;  pre: change in bowel habits  post: diverticulosis. hemorrhoids.   Olivia Mora APRN        CYSTECTOMY      D & C HYSTEROSCOPY N/A 2017    Procedure: DILATATION AND CURETTAGE HYSTEROSCOPY;  Surgeon: Shasta Madrigal MD;  Location: [facility] OR;  Service:     DILATION AND CURETTAGE, DIAGNOSTIC / THERAPEUTIC      ENDOSCOPY  2010    Short segment of Arriola's,Moderate chroninc esophagogastritis and negative H.pylori     ENDOSCOPY N/A 09/25/2017    Procedure: ESOPHAGOGASTRODUODENOSCOPY WITH ANESTHESIA;  Surgeon: Tom Velasco DO;  Location:  [facility];  Service:     EYE SURGERY      RETINA    HEMORRHOIDECTOMY SIGMOIDOSCOPY N/A 3/21/2023    Procedure: HEMORRHOIDECTOMY WITH EXAM UNDER ANESTHESIA;  Surgeon: Holly Chavez MD;  Location:  [facility] OR;  Service: General;  Laterality: N/A;    HYSTERECTOMY  12/20/2017    ORIF TIBIA/FIBULA FRACTURES Left 2000    TRANSVAGINAL TAPING SUSPENSION N/A 11/06/2017    Procedure: VAGINAL MESH REVISION;  Surgeon: Shasta Madrigal MD;  Location:  [facility] OR;  Service:     VAGINAL MESH REVISION  2013      General Information       Row Name 10/09/24 1303          OT Time and Intention    Document Type therapy note (daily note)  -MT     Mode of Treatment occupational therapy  -MT       Row Name 10/09/24 1303          General Information    Patient Profile Reviewed yes  -MT     Existing Precautions/Restrictions fall  -MT       Row Name 10/09/24 1303          Cognition    Orientation Status (Cognition) oriented to;person  -MT       Row Name 10/09/24 1303          Safety Issues, Functional Mobility    Impairments Affecting Function (Mobility) balance;cognition;endurance/activity tolerance;pain;strength  -MT     Cognitive Impairments, Mobility Safety/Performance attention;awareness, need for assistance;insight into deficits/self-awareness;problem-solving/reasoning;safety precaution awareness;safety precaution follow-through;sequencing abilities  -MT               User Key  (r) = Recorded By, (t) = Taken By, (c) = Cosigned By      Initials Name Provider Type    MT Sandra Jolley COTA Occupational Therapist Assistant                     Mobility/ADL's    No documentation.                  Obj/Interventions       Row Name 10/09/24 1300          Motor Skills    Therapeutic Exercise --  Performed bicep curls and wrist flex/ext with 2# wand. Pt unable to tolerate shoulder movement with weight (LUE)  performed A-AAROM LUE, AROM RUE in shoulder planes x10 reps each ther ex for increased fxl strength/endurance to ADLs/transfers.  -MT               User Key  (r) = Recorded By, (t) = Taken By, (c) = Cosigned By      Initials Name Provider Type    Sandra Potter COTA Occupational Therapist Assistant                   Goals/Plan    No documentation.                  Clinical Impression       Row Name 10/09/24 1303          Therapy Plan Review/Discharge Plan (OT)    Anticipated Discharge Disposition (OT) skilled nursing facility  -MT       Row Name 10/09/24 1303          Positioning and Restraints    Pre-Treatment Position in bed  -MT     Post Treatment Position bed  -MT     In Bed fowlers;call light within reach;encouraged to call for assist;exit alarm on;side rails up x2  -MT               User Key  (r) = Recorded By, (t) = Taken By, (c) = Cosigned By      Initials Name Provider Type    Sandra Potter COTA Occupational Therapist Assistant                   Outcome Measures       Row Name 10/09/24 1303          How much help from another is currently needed...    Putting on and taking off regular lower body clothing? 1  -MT     Bathing (including washing, rinsing, and drying) 2  -MT     Toileting (which includes using toilet bed pan or urinal) 1  -MT     Putting on and taking off regular upper body clothing 2  -MT     Taking care of personal grooming (such as brushing teeth) 2  -MT     Eating meals 3  -MT     AM-PAC 6 Clicks Score (OT) 11  -MT               User Key  (r) = Recorded By, (t) = Taken By, (c) = Cosigned By      Initials Name Provider Type    Sandra Potter COTA Occupational Therapist Assistant                    Occupational Therapy Education       Title: PT OT SLP Therapies (In Progress)       Topic: Occupational Therapy (In Progress)       Point: ADL training (Done)       Description:   Instruct learner(s) on proper safety adaptation and remediation techniques during self care or  transfers.   Instruct in proper use of assistive devices.                  Learning Progress Summary             Patient Acceptance, E, VU by MT at 10/9/2024 1322    Comment: need for OT and OOB/in activity for increased I and self care    Acceptance, E, VU,NR by  at 10/7/2024 1105                         Point: Home exercise program (Not Started)       Description:   Instruct learner(s) on appropriate technique for monitoring, assisting and/or progressing therapeutic exercises/activities.                  Learner Progress:  Not documented in this visit.              Point: Precautions (Done)       Description:   Instruct learner(s) on prescribed precautions during self-care and functional transfers.                  Learning Progress Summary             Patient Acceptance, E, VU,NR by  at 10/7/2024 1105                         Point: Body mechanics (Done)       Description:   Instruct learner(s) on proper positioning and spine alignment during self-care, functional mobility activities and/or exercises.                  Learning Progress Summary             Patient Acceptance, E, VU,NR by  at 10/7/2024 1105                                         User Key       Initials Effective Dates Name Provider Type Discipline    MT 06/16/21 -  Sandra Jolley COTA Occupational Therapist Assistant OT     06/20/22 -  Faith Purvis OTR/L Occupational Therapist OT                  OT Recommendation and Plan     Plan of Care Review  Plan of Care Reviewed With: patient  Progress: no change  Outcome Evaluation: Performed bicep curls and wrist flex/ext with 2# wand. Pt unable to tolerate shoulder movement with weight (LUE) performed A-AAROM LUE, AROM RUE in shoulder planes x10 reps each ther ex for increased fxl strength/endurance to ADLs/transfers. Cues to remain on task, pt needed encouraged throughout to continue tasks. Pt washed face s/u RUE and applied chapstick. Offered further ADLs for grooming and hygiene but pt  "declined reporting \"I'm done, it hurts\" pt unable to state location of pain or any efforts KOEHLER/L could do to assist with pain. Assisted repositioning modA for UB in bed in attempt for further comfort. Pt declined further activity. Recommend SNF.     Time Calculation:         Time Calculation- OT       Row Name 10/09/24 1303             Time Calculation- OT    OT Start Time 1303  -MT      OT Stop Time 1326  -MT      OT Time Calculation (min) 23 min  -MT      Total Timed Code Minutes- OT 23 minute(s)  -MT      OT Received On 10/09/24  -MT         Timed Charges    30333 - OT Therapeutic Exercise Minutes 13  -MT      25818 - OT Self Care/Mgmt Minutes 10  -MT         Total Minutes    Timed Charges Total Minutes 23  -MT       Total Minutes 23  -MT                User Key  (r) = Recorded By, (t) = Taken By, (c) = Cosigned By      Initials Name Provider Type    MT Sandra Jolley COTA Occupational Therapist Assistant                  Therapy Charges for Today       Code Description Service Date Service Provider Modifiers Qty    35970415393 HC OT THER PROC EA 15 MIN 10/9/2024 Sandra Jolley COTA GO 1    00667468064 HC OT SELF CARE/MGMT/TRAIN EA 15 MIN 10/9/2024 Sandra Jolley COTA GO 1                 ZAKIA Garibay  10/9/2024  "

## 2024-10-09 NOTE — PROGRESS NOTES
[facility] Medicine Services  INPATIENT PROGRESS NOTE    Patient Name: Jennifer Gomes  Date of Admission: 10/5/2024  Today's Date: 10/09/24  Length of Stay: 4  Primary Care Physician: Provider, No Known    Subjective   Chief Complaint: Cardiopulmonary arrest, encephalopathy  HPI     The patient is awake, alert and talkative.  She has been seen by urology.  Smith catheter was initially placed in the intensive care unit however the patient developed bilateral hydronephrosis and urinary retention after the Smith was removed requiring the Smith to be replaced.  Repeat CT scan has been ordered tomorrow by urology.  Given the patient's improvement, it is anticipated that the patient will return home with her daughter.  Both PT and OT have recommended SNF placement however the patient's daughter will not agree and plans to take her home.  She will need a PCP prior to discharge to manage home health orders in for follow-up.  Creatinine has improved to 1.80 with her baseline creatinine 1.6.  BUN is 44.  White blood cell count is within normal limits with hemoglobin 7.6.  Urine and blood cultures reviewed.  Blood culture shows pansensitive E. coli.  Urine culture shows variable resistance but sensitivity to current IV antibiotics with ceftriaxone.  Will transition to cefdinir at discharge.  She is tolerating regular diet without difficulty.    Review of Systems   All pertinent negatives and positives are as above. All other systems have been reviewed and are negative unless otherwise stated.     Objective    Temp:  [97.6 °F (36.4 °C)-99.3 °F (37.4 °C)] 99.3 °F (37.4 °C)  Heart Rate:  [83-96] 84  Resp:  [14-16] 16  BP: (148-182)/(49-68) 182/63  Physical Exam  Constitutional:       Appearance: Normal appearance. She is normal weight.   HENT:      Head: Normocephalic and atraumatic.      Right Ear: External ear normal.      Left Ear: External ear normal.      Nose: Nose normal.       Mouth/Throat:      Mouth: Mucous membranes are moist.      Pharynx: Oropharynx is clear.   Eyes:      General: No scleral icterus.     Conjunctiva/sclera: Conjunctivae normal.   Cardiovascular:      Rate and Rhythm: Normal rate and regular rhythm.      Pulses: Normal pulses.      Heart sounds: Normal heart sounds. No murmur heard.  Pulmonary:      Effort: Pulmonary effort is normal. No respiratory distress.      Breath sounds: Normal breath sounds.   Abdominal:      General: Abdomen is flat. Bowel sounds are normal.      Palpations: Abdomen is soft. There is no mass.      Tenderness: There is no abdominal tenderness.   Musculoskeletal:         General: Normal range of motion.   Skin:     General: Skin is warm and dry.   Neurological:      General: No focal deficit present.      Mental Status: She is alert and oriented to person, place, and time.   Psychiatric:         Mood and Affect: Mood normal.         Judgment: Judgment normal.       Results Review:  I have reviewed the labs, radiology results, and diagnostic studies.    Laboratory Data:   Results from last 7 days   Lab Units 10/09/24  0439 10/08/24  0216 10/07/24  1234 10/07/24  0220   WBC 10*3/mm3 7.93 10.92*  --  12.52*   HEMOGLOBIN g/dL 7.6* 7.8* 7.4* 7.2*   HEMATOCRIT % 24.5* 26.3* 23.8* 23.2*   PLATELETS 10*3/mm3 155 125*  --  127*        Results from last 7 days   Lab Units 10/09/24  0439 10/08/24  0216 10/07/24  0220 10/05/24  0927 10/05/24  0551 10/05/24  0201   SODIUM mmol/L 138 137 137   < > 135* 133*   POTASSIUM mmol/L 3.9 4.0 3.6   < > 4.0 4.5   CHLORIDE mmol/L 106 109* 107   < > 99 97*   CO2 mmol/L 21.0* 18.0* 21.0*   < > 20.0* 22.0   BUN mg/dL 44* 47* 43*   < > 43* 47*   CREATININE mg/dL 1.80* 2.18* 2.18*   < > 2.13* 2.18*   CALCIUM mg/dL 9.1 8.7 8.2*   < > 8.7 9.0   BILIRUBIN mg/dL  --   --   --   --  0.5 0.5   ALK PHOS U/L  --   --   --   --  83 70   ALT (SGPT) U/L  --   --   --   --  20 12   AST (SGOT) U/L  --   --   --   --  48* 19   GLUCOSE  mg/dL 124* 134* 138*   < > 187* 165*    < > = values in this interval not displayed.       Culture Data:   Blood Culture   Date Value Ref Range Status   10/07/2024 No growth at 2 days  Preliminary   10/07/2024 No growth at 2 days  Preliminary   10/05/2024 Escherichia coli (C)  Final   10/05/2024 Escherichia coli (C)  Final     Urine Culture   Date Value Ref Range Status   10/05/2024 >100,000 CFU/mL Morganella morganii ssp morganii (A)  Final       Radiology Data:   Imaging Results (Last 24 Hours)       ** No results found for the last 24 hours. **            I have reviewed the patient's current medications.     Assessment/Plan   Assessment  Active Hospital Problems    Diagnosis     **Sepsis due to urinary tract infection     Cardiopulmonary arrest     E coli bacteremia     Anemia requiring transfusions     Bilateral hydronephrosis     Acute renal failure superimposed on stage 3 chronic kidney disease        Treatment Plan  Continue therapies  Continue IV antibiotics with Rocephin  Will transition to cefdinir at discharge    Medical Decision Making  Number and Complexity of problems:   6+ acute, high complexity problems    Differential Diagnosis: None    Conditions and Status        Condition is improving.     MDM Data  External documents reviewed: See HPI  Cardiac tracing (EKG, telemetry) interpretation: See HPI  Radiology interpretation: See HPI  Labs reviewed: See HPI  Any tests that were considered but not ordered: None     Decision rules/scores evaluated (example [id number], Wells, etc): None     Discussed with: The patient     Care Planning  Shared decision making: The patient and her daughter  Code status and discussions: Full code    Disposition  Social Determinants of Health that impact treatment or disposition: None  I expect the patient to be discharged to home in 2-3 days.     Electronically signed by Lonnie Murphy DO, 10/09/24, 18:25 CDT.

## 2024-10-10 ENCOUNTER — APPOINTMENT (OUTPATIENT)
Dept: CT IMAGING | Facility: HOSPITAL | Age: 84
End: 2024-10-10
Payer: MEDICARE

## 2024-10-10 PROCEDURE — 97535 SELF CARE MNGMENT TRAINING: CPT

## 2024-10-10 PROCEDURE — 25010000002 HEPARIN (PORCINE) PER 1000 UNITS: Performed by: INTERNAL MEDICINE

## 2024-10-10 PROCEDURE — 74176 CT ABD & PELVIS W/O CONTRAST: CPT

## 2024-10-10 PROCEDURE — 97110 THERAPEUTIC EXERCISES: CPT

## 2024-10-10 PROCEDURE — 97530 THERAPEUTIC ACTIVITIES: CPT

## 2024-10-10 PROCEDURE — 99232 SBSQ HOSP IP/OBS MODERATE 35: CPT | Performed by: CLINICAL NURSE SPECIALIST

## 2024-10-10 PROCEDURE — 25010000002 CEFTRIAXONE PER 250 MG: Performed by: INTERNAL MEDICINE

## 2024-10-10 PROCEDURE — 99232 SBSQ HOSP IP/OBS MODERATE 35: CPT | Performed by: UROLOGY

## 2024-10-10 RX ORDER — BISACODYL 10 MG
10 SUPPOSITORY, RECTAL RECTAL DAILY PRN
Status: DISCONTINUED | OUTPATIENT
Start: 2024-10-10 | End: 2024-10-15 | Stop reason: HOSPADM

## 2024-10-10 RX ORDER — BISACODYL 5 MG/1
5 TABLET, DELAYED RELEASE ORAL DAILY PRN
Status: DISCONTINUED | OUTPATIENT
Start: 2024-10-10 | End: 2024-10-15 | Stop reason: HOSPADM

## 2024-10-10 RX ORDER — AMOXICILLIN 250 MG
2 CAPSULE ORAL 2 TIMES DAILY
Status: DISCONTINUED | OUTPATIENT
Start: 2024-10-10 | End: 2024-10-15 | Stop reason: HOSPADM

## 2024-10-10 RX ADMIN — HEPARIN SODIUM 5000 UNITS: 5000 INJECTION, SOLUTION INTRAVENOUS; SUBCUTANEOUS at 21:25

## 2024-10-10 RX ADMIN — Medication 10 ML: at 21:26

## 2024-10-10 RX ADMIN — POLYETHYLENE GLYCOL 3350 17 G: 17 POWDER, FOR SOLUTION ORAL at 21:26

## 2024-10-10 RX ADMIN — ATORVASTATIN CALCIUM 40 MG: 40 TABLET, FILM COATED ORAL at 08:11

## 2024-10-10 RX ADMIN — CEFTRIAXONE SODIUM 2000 MG: 2 INJECTION, POWDER, FOR SOLUTION INTRAMUSCULAR; INTRAVENOUS at 08:12

## 2024-10-10 RX ADMIN — PANTOPRAZOLE SODIUM 40 MG: 40 TABLET, DELAYED RELEASE ORAL at 05:18

## 2024-10-10 RX ADMIN — CARVEDILOL 12.5 MG: 6.25 TABLET, FILM COATED ORAL at 08:11

## 2024-10-10 RX ADMIN — DONEPEZIL HYDROCHLORIDE 10 MG: 10 TABLET, FILM COATED ORAL at 21:26

## 2024-10-10 RX ADMIN — Medication 10 MG: at 21:26

## 2024-10-10 RX ADMIN — OXYCODONE HYDROCHLORIDE 10 MG: 5 TABLET ORAL at 21:25

## 2024-10-10 RX ADMIN — LEVOTHYROXINE SODIUM 50 MCG: 100 TABLET ORAL at 05:18

## 2024-10-10 RX ADMIN — CARVEDILOL 12.5 MG: 6.25 TABLET, FILM COATED ORAL at 21:26

## 2024-10-10 RX ADMIN — CHOLECALCIFEROL TAB 10 MCG (400 UNIT) 400 UNITS: 10 TAB at 08:13

## 2024-10-10 RX ADMIN — HEPARIN SODIUM 5000 UNITS: 5000 INJECTION, SOLUTION INTRAVENOUS; SUBCUTANEOUS at 05:18

## 2024-10-10 RX ADMIN — HEPARIN SODIUM 5000 UNITS: 5000 INJECTION, SOLUTION INTRAVENOUS; SUBCUTANEOUS at 13:53

## 2024-10-10 RX ADMIN — Medication 10 ML: at 08:16

## 2024-10-10 RX ADMIN — SERTRALINE HYDROCHLORIDE 25 MG: 50 TABLET ORAL at 08:11

## 2024-10-10 RX ADMIN — LACTULOSE 20 G: 20 SOLUTION ORAL at 08:12

## 2024-10-10 RX ADMIN — OXYCODONE HYDROCHLORIDE 10 MG: 5 TABLET ORAL at 04:30

## 2024-10-10 RX ADMIN — OXYCODONE HYDROCHLORIDE 10 MG: 5 TABLET ORAL at 13:53

## 2024-10-10 NOTE — PLAN OF CARE
"Goal Outcome Evaluation:  Plan of Care Reviewed With: patient        Progress: no change  Outcome Evaluation: AOx4, confused at times, RA, tele. BP has been on the higher side. Incont of bowel and bladder, pw is in place. C/O pain R Knee doesn't want anything for it. Pt stated \"I would like everyone to leave me alone so don't touch me or move me. I am fine where I am at.\" Resting well throughout the night with intermittent restlessness. Call light within reach, bed alarm on.                                "

## 2024-10-10 NOTE — PROGRESS NOTES
LOS: 5 days   Patient Care Team:  Provider, No Known as PCP - General  [facility]  Eleazar Ramires MD as Cardiologist (Cardiology)  Shasta Madrigal MD as Referring Physician (Obstetrics and Gynecology)  Holly Chavez MD as Consulting Physician (General Surgery)    Chief Complaint:  urinary retention, bilateral hydronephrosis, constipation    Subjective     Interval History:     Smith removed by nursing yesterday, perhaps due to my reconciliation of old orders yesterday.     Discussed CT findings with the patient. Distended bladder noted with unchanged bilateral hydronephrosis. Discussed plan to replace Smith and manage bowels.     Review of Systems  Pertinent items are noted in HPI, all other systems reviewed and negative     Objective     Vital Signs  Temp:  [97.7 °F (36.5 °C)-99.3 °F (37.4 °C)] 97.9 °F (36.6 °C)  Heart Rate:  [80-87] 87  Resp:  [14-16] 16  BP: (161-182)/(49-76) 175/70    Physical Exam:  Nontoxic. Comfortable.      Data Review:       I have reviewed the following data:    CT Abdomen Pelvis Without Contrast (10/10/2024 08:00)     Medication Review:     Current Facility-Administered Medications:     acetaminophen (TYLENOL) tablet 650 mg, 650 mg, Oral, Q4H PRN, 650 mg at 10/07/24 1056 **OR** acetaminophen (TYLENOL) 160 MG/5ML oral solution 650 mg, 650 mg, Oral, Q4H PRN, 650 mg at 10/06/24 1232 **OR** acetaminophen (TYLENOL) suppository 650 mg, 650 mg, Rectal, Q4H PRN, Cuba Espitia MD    atorvastatin (LIPITOR) tablet 40 mg, 40 mg, Oral, Daily, Quentin Abernathy, DO, 40 mg at 10/10/24 0811    sennosides-docusate (PERICOLACE) 8.6-50 MG per tablet 2 tablet, 2 tablet, Oral, BID PRN, 2 tablet at 10/07/24 1505 **AND** [DISCONTINUED] polyethylene glycol (MIRALAX) packet 17 g, 17 g, Oral, Daily PRN **AND** bisacodyl (DULCOLAX) EC tablet 5 mg, 5 mg, Oral, Daily PRN **AND** bisacodyl (DULCOLAX) suppository 10 mg, 10 mg, Rectal, Daily PRN, Cuba Espitia MD    carvedilol  (COREG) tablet 12.5 mg, 12.5 mg, Oral, Q12H, Zeinab Trujillo MD, 12.5 mg at 10/10/24 0811    cefTRIAXone (ROCEPHIN) 2,000 mg in sodium chloride 0.9 % 100 mL MBP, 2,000 mg, Intravenous, Q24H, Quentin Abernathy DO, Last Rate: 200 mL/hr at 10/10/24 0812, 2,000 mg at 10/10/24 0812    cholecalciferol (VITAMIN D3) tablet 800 Units, 800 Units, Oral, Daily, Quentin Abernathy DO, 400 Units at 10/10/24 0813    donepezil (ARICEPT) tablet 10 mg, 10 mg, Oral, Nightly, Quentin Abernathy DO, 10 mg at 10/09/24 2102    heparin (porcine) 5000 UNIT/ML injection 5,000 Units, 5,000 Units, Subcutaneous, Q8H, Quentin Abernathy DO, 5,000 Units at 10/10/24 0518    lactulose solution 20 g, 20 g, Oral, Daily, Quentin Abernathy DO, 20 g at 10/10/24 0812    levothyroxine (SYNTHROID, LEVOTHROID) tablet 50 mcg, 50 mcg, Oral, Q AM, Quentin Abernathy DO, 50 mcg at 10/10/24 0518    melatonin tablet 10 mg, 10 mg, Oral, Nightly PRN, Han Gibbs, APRN, 10 mg at 10/07/24 2050    oxyCODONE (ROXICODONE) immediate release tablet 10 mg, 10 mg, Oral, Q4H PRN, Quentin Abernathy DO, 10 mg at 10/10/24 0430    pantoprazole (PROTONIX) EC tablet 40 mg, 40 mg, Oral, Q AM, Quentin Abernathy DO, 40 mg at 10/10/24 0518    polyethylene glycol (MIRALAX) packet 17 g, 17 g, Oral, BID, Quentin Abernathy DO, 17 g at 10/09/24 0851    sertraline (ZOLOFT) tablet 25 mg, 25 mg, Oral, Daily, Quentin Abernathy DO, 25 mg at 10/10/24 0811    sodium chloride 0.9 % flush 10 mL, 10 mL, Intravenous, PRN, Ta Castillo MD    sodium chloride 0.9 % flush 10 mL, 10 mL, Intravenous, Q12H, Cuba Espitia MD, 10 mL at 10/10/24 0816    sodium chloride 0.9 % flush 10 mL, 10 mL, Intravenous, PRN, Cuba Espitia MD    Assessment and Plan:    Bilateral hydronephrosis and incomplete emptying of bladder: Smith removed yesterday secondary to likely confusion of orders. Replace Smith. Bowel  management. Renal function panel in the morning. Discussed with Dr. Murphy who will adjust bowel management as needed.     URO DISPO: I will assess the patient again tomorrow.     I discussed the patient's findings and my recommendations with patient, nursing staff, and Dr. Murphy    (Please note that portions of this note were completed with a voice recognition program.)    Moiz Gallegos MD  10/10/24  10:31 CDT    Time: Time spent: 20 minutes spent performing evaluation and management, chart review, and discussion with patient, > 50% of time spent in face-to-face encounter

## 2024-10-10 NOTE — THERAPY TREATMENT NOTE
Patient Name: Jennifer Gomes  : 1940    MRN: [id number]                              Today's Date: 10/10/2024       Admit Date: 10/5/2024    Visit Dx: Therapist utilized gait belt, applied non-slipped socks, provided fall risk education/prevention, & facilitated muscle strengthening PRN to reduce patient falls risk during this session.      ICD-10-CM ICD-9-CM   1. Sepsis without acute organ dysfunction, due to unspecified organism  A41.9 038.9     995.91   2. Acute cystitis without hematuria  N30.00 595.0   3. Gastroenteritis  K52.9 558.9   4. Acute metabolic encephalopathy  G93.41 348.31   5. Acute respiratory failure with hypoxia  J96.01 518.81   6. Dysphagia, unspecified type [R13.10]  R13.10 787.20   7. Impaired mobility [Z74.09]  Z74.09 799.89     Patient Active Problem List   Diagnosis    Gastroesophageal reflux disease    Spinal stenosis, lumbar region, without neurogenic claudication    Erosion of vaginal mesh    Adenocarcinoma of endometrium    S/P hysterectomy with oophorectomy    Encounter for follow-up surveillance of endometrial cancer    History of radiation therapy    Non-traumatic rhabdomyolysis    Acute renal failure superimposed on stage 3 chronic kidney disease    Medical non-compliance, does not take narcotics as prescribed    Chronic constipation    Chronic pain syndrome    Chronic prescription opiate use    Anemia    Hypothyroidism (acquired)    Essential hypertension    Venous insufficiency of both lower extremities    Chronic intractable headache    RAFAL (obstructive sleep apnea)    Acute encephalopathy due to UTI    Toxic metabolic encephalopathy    Polypharmacy    Frequent falls    Grade III internal hemorrhoids    External hemorrhoids    Colitis    Moderate malnutrition    Transaminitis    Acute UTI (urinary tract infection)    Polypharmacy    UTI (urinary tract infection)    Dementia    Hypokalemia    Sepsis due to urinary tract infection    Cardiopulmonary arrest    E coli  bacteremia    Anemia requiring transfusions    Bilateral hydronephrosis     Past Medical History:   Diagnosis Date    Anxiety     Arthritis     Bronchitis     Cancer     uterine    Chronic pain     Depression     Disease of thyroid gland     Fibromyalgia     GERD (gastroesophageal reflux disease)     Headache     History of transfusion     AS     Hyperlipidemia     Hypertension     Incontinence     Insomnia     Leg pain     Lumbar stenosis     Migraines     Peptic ulcer     Restless legs     Sleep apnea     NO C-PAP    UTI (urinary tract infection)     Vaginal bleeding      Past Surgical History:   Procedure Laterality Date    BLADDER REPAIR      MESH HAD TO BE REMOVED IN 2013    BREAST BIOPSY Right 2017    benign    BREAST CYST EXCISION Left 1982    CARDIAC CATHETERIZATION      CARPAL TUNNEL RELEASE      CATARACT EXTRACTION W/ INTRAOCULAR LENS  IMPLANT, BILATERAL      CHOLECYSTECTOMY WITH INTRAOPERATIVE CHOLANGIOGRAM N/A 2023    Procedure: CHOLECYSTECTOMY LAPAROSCOPIC INTRAOPERATIVE CHOLANGIOGRAM;  Surgeon: Gerardo Arciniega MD;  Location: [facility] OR;  Service: General;  Laterality: N/A;    COLONOSCOPY      COLONOSCOPY N/A 10/01/2021    Procedure: COLONOSCOPY WITH ANESTHESIA;  Surgeon: Tom Velasco DO;  Location: [facility] ENDOSCOPY;  Service: Gastroenterology;  Laterality: N/A;  pre: change in bowel habits  post: diverticulosis. hemorrhoids.   Olivia Mora APRN        CYSTECTOMY      D & C HYSTEROSCOPY N/A 2017    Procedure: DILATATION AND CURETTAGE HYSTEROSCOPY;  Surgeon: Shasta Madrigal MD;  Location: [facility] OR;  Service:     DILATION AND CURETTAGE, DIAGNOSTIC / THERAPEUTIC      ENDOSCOPY  2010    Short segment of Arriola's,Moderate chroninc esophagogastritis and negative H.pylori    ENDOSCOPY N/A 2017    Procedure: ESOPHAGOGASTRODUODENOSCOPY WITH ANESTHESIA;  Surgeon: Tom Velasco DO;  Location: [facility] ENDOSCOPY;  Service:     EYE SURGERY      RETINA     "HEMORRHOIDECTOMY SIGMOIDOSCOPY N/A 3/21/2023    Procedure: HEMORRHOIDECTOMY WITH EXAM UNDER ANESTHESIA;  Surgeon: Holly Chavez MD;  Location:  [facility] OR;  Service: General;  Laterality: N/A;    HYSTERECTOMY  12/20/2017    ORIF TIBIA/FIBULA FRACTURES Left 2000    TRANSVAGINAL TAPING SUSPENSION N/A 11/06/2017    Procedure: VAGINAL MESH REVISION;  Surgeon: Shasta Madrigal MD;  Location:  [facility] OR;  Service:     VAGINAL MESH REVISION  2013      General Information       Row Name 10/10/24 1018          OT Time and Intention    Document Type therapy note (daily note)  -MT       Row Name 10/10/24 1018          General Information    Patient Profile Reviewed yes  -MT     Existing Precautions/Restrictions fall  -MT       Row Name 10/10/24 1018          Cognition    Orientation Status (Cognition) oriented to;person;place  \"hospital\"  -MT               User Key  (r) = Recorded By, (t) = Taken By, (c) = Cosigned By      Initials Name Provider Type    MT Sandra Jolley COTA Occupational Therapist Assistant                     Mobility/ADL's       Row Name 10/10/24 1018          Bed Mobility    Supine-Sit [place] (Bed Mobility) verbal cues;moderate assist (50% patient effort)  -MT     Sit-Supine [place] (Bed Mobility) minimum assist (75% patient effort)  -MT     Assistive Device (Bed Mobility) head of bed elevated;bed rails;draw sheet  -MT       Row Name 10/10/24 1018          Transfers    Transfers sit-stand transfer;stand-sit transfer  -MT     Comment, (Transfers) PTA present and assisted with stand. Able with ModAx2 but very forward flexed and unable to take steps/weight shift to get higher up in bed.  -MT       Row Name 10/10/24 1018          Sit-Stand Transfer    Sit-Stand [place] (Transfers) verbal cues;moderate assist (50% patient effort);2 person assist  -MT       Row Name 10/10/24 1018          Stand-Sit Transfer    Stand-Sit [place] (Transfers) moderate assist (50% patient effort)  -MT       " Row Name 10/10/24 1018          Activities of Daily Living    BADL Assessment/Intervention lower body dressing  -MT       Row Name 10/10/24 1018          Lower Body Dressing Assessment/Training    [place] Level (Lower Body Dressing) lower body dressing skills;dependent (less than 25% patient effort)  -MT     Position (Lower Body Dressing) supine  -MT     Comment, (Lower Body Dressing) brief  -MT       Row Name 10/10/24 1018          Toileting Assessment/Training    Comment, (Toileting) depA, had urinated  -MT               User Key  (r) = Recorded By, (t) = Taken By, (c) = Cosigned By      Initials Name Provider Type    Sandra Potter COTA Occupational Therapist Assistant                   Obj/Interventions    No documentation.                  Goals/Plan    No documentation.                  Clinical Impression       Row Name 10/10/24 1018          Pain Assessment    Pretreatment Pain Rating 6/10  -MT     Posttreatment Pain Rating 6/10  -MT     Pain Location - Side/Orientation Right  -MT     Pain Location - knee  -MT       Row Name 10/10/24 1018          Plan of Care Review    Plan of Care Reviewed With patient  -MT     Progress improving  -MT       Row Name 10/10/24 1018          Therapy Plan Review/Discharge Plan (OT)    Anticipated Discharge Disposition (OT) skilled nursing facility  -MT       Row Name 10/10/24 1018          Positioning and Restraints    Pre-Treatment Position in bed  -MT     Post Treatment Position bed  -MT     In Bed fowlers;call light within reach;encouraged to call for assist;exit alarm on;side rails up x2;with nsg  -MT               User Key  (r) = Recorded By, (t) = Taken By, (c) = Cosigned By      Initials Name Provider Type    Sandra Potter COTA Occupational Therapist Assistant                   Outcome Measures       Row Name 10/10/24 1118          How much help from another is currently needed...    Putting on and taking off regular lower body clothing? 1  -MT      Bathing (including washing, rinsing, and drying) 2  -MT     Toileting (which includes using toilet bed pan or urinal) 1  -MT     Putting on and taking off regular upper body clothing 2  -MT     Taking care of personal grooming (such as brushing teeth) 3  -MT     Eating meals 3  -MT     AM-PAC 6 Clicks Score (OT) 12  -MT       Row Name 10/10/24 0812          How much help from another person do you currently need...    Turning from your back to your side while in flat bed without using bedrails? 3  -CP     Moving from lying on back to sitting on the side of a flat bed without bedrails? 2  -CP     Moving to and from a bed to a chair (including a wheelchair)? 1  -CP     Standing up from a chair using your arms (e.g., wheelchair, bedside chair)? 1  -CP     Climbing 3-5 steps with a railing? 1  -CP     To walk in hospital room? 1  -CP     AM-PAC 6 Clicks Score (PT) 9  -CP     Highest Level of Mobility Goal 3 --> Sit at edge of bed  -CP               User Key  (r) = Recorded By, (t) = Taken By, (c) = Cosigned By      Initials Name Provider Type    MT Sandra Jolley COTA Occupational Therapist Assistant    CP Rubi Lam, RN Registered Nurse                    Occupational Therapy Education       Title: PT OT SLP Therapies (In Progress)       Topic: Occupational Therapy (In Progress)       Point: ADL training (Done)       Description:   Instruct learner(s) on proper safety adaptation and remediation techniques during self care or transfers.   Instruct in proper use of assistive devices.                  Learning Progress Summary             Patient Acceptance, E, VU by MT at 10/9/2024 1322    Comment: need for OT and OOB/in activity for increased I and self care    Acceptance, E, VU,NR by LS at 10/7/2024 1105                         Point: Home exercise program (Not Started)       Description:   Instruct learner(s) on appropriate technique for monitoring, assisting and/or progressing therapeutic  exercises/activities.                  Learner Progress:  Not documented in this visit.              Point: Precautions (Done)       Description:   Instruct learner(s) on prescribed precautions during self-care and functional transfers.                  Learning Progress Summary             Patient Acceptance, E, VU,NR by  at 10/7/2024 1105                         Point: Body mechanics (Done)       Description:   Instruct learner(s) on proper positioning and spine alignment during self-care, functional mobility activities and/or exercises.                  Learning Progress Summary             Patient Acceptance, E, VU,NR by  at 10/7/2024 1105                                         User Key       Initials Effective Dates Name Provider Type Discipline    MT 06/16/21 -  Sandra Jolley COTA Occupational Therapist Assistant OT     06/20/22 -  Faith Purvis OTR/L Occupational Therapist OT                  OT Recommendation and Plan     Plan of Care Review  Plan of Care Reviewed With: patient  Progress: improving  Outcome Evaluation: pt more alert and agreeable to OT tx this date. Supine<>sit ModA. Sat EOB x20 mins. required frequent RBs over on RUE d/t fatigue encouraged to sit upright and benefits of upright endurance/sitting for strengthening and carryover to t/fs. PTA present and assisted with stand. Able with ModAx2 but very forward flexed and unable to take steps/weight shift to get higher up in bed. BTB modA and rolled min-modA. Had urinated, depA needed for hygiene care in bed. NSG present and took over pt care for catheter placement. Continue to recommend SNF as safest option for pt     Time Calculation:         Time Calculation- OT       Row Name 10/10/24 1018             Time Calculation- OT    OT Start Time 1018  -MT      OT Stop Time 1056  -MT      OT Time Calculation (min) 38 min  -MT      Total Timed Code Minutes- OT 38 minute(s)  -MT      OT Received On 10/10/24  -MT         Timed Charges     11362 - OT Self Care/Mgmt Minutes 38  -MT         Total Minutes    Timed Charges Total Minutes 38  -MT       Total Minutes 38  -MT                User Key  (r) = Recorded By, (t) = Taken By, (c) = Cosigned By      Initials Name Provider Type    Sandra Potter COTA Occupational Therapist Assistant                  Therapy Charges for Today       Code Description Service Date Service Provider Modifiers Qty    66953679322 HC OT THER PROC EA 15 MIN 10/9/2024 Sandra Jolley COTA GO 1    05471920328 HC OT SELF CARE/MGMT/TRAIN EA 15 MIN 10/9/2024 Sandra Jolley COTA GO 1    80205625804 HC OT SELF CARE/MGMT/TRAIN EA 15 MIN 10/10/2024 Sandra Jolley COTA GO 3                 ZAKIA Garibay  10/10/2024

## 2024-10-10 NOTE — PLAN OF CARE
Goal Outcome Evaluation:  Plan of Care Reviewed With: patient        Progress: improving  Outcome Evaluation: Pt A&Ox3. Up w/ PT. Smith re-inserted per MD orders. Inc of stool. Enema given this shift. C/o of chronic R knee pain w/ PRN meds given. IV abx cont. Call light in reach. Safety maintained.

## 2024-10-10 NOTE — PLAN OF CARE
Goal Outcome Evaluation:  Plan of Care Reviewed With: patient        Progress: improving  Outcome Evaluation: pt more alert and agreeable to OT tx this date. Supine<>sit ModA. Sat EOB x20 mins. required frequent RBs over on RUE d/t fatigue encouraged to sit upright and benefits of upright endurance/sitting for strengthening and carryover to t/fs. PTA present and assisted with stand. Able with ModAx2 but very forward flexed and unable to take steps/weight shift to get higher up in bed. BTB modA and rolled min-modA. Had urinated, depA needed for hygiene care in bed. NSG present and took over pt care for catheter placement. Continue to recommend SNF as safest option for pt      Anticipated Discharge Disposition (OT): skilled nursing facility

## 2024-10-10 NOTE — PROGRESS NOTES
[facility] Palliative Care Services    Palliative Care Daily Progress Note   Chief complaint-follow up support for patient/family    Code Status:   Code Status and Medical Interventions: CPR (Attempt to Resuscitate); Full Support   Ordered at: 10/07/24 1320     Code Status (Patient has no pulse and is not breathing):    CPR (Attempt to Resuscitate)     Medical Interventions (Patient has pulse or is breathing):    Full Support      Advanced Directives: Advance Directive Status: Patient does not have advance directive   Goals of Care: Ongoing.     S: Medical record reviewed. Events noted.  Urology recommendations repeat CT completed this morning which shows persistent and unchanged bilateral moderate hydronephrosis and hydroureter, and moderately distended urinary bladder.  Probable bilateral vesicoureteral reflux as suggested previously.  Large volume of stool throughout colon.  Moderate rectal wall thickening with surrounding fat infiltration may represent stercoral proctitis.  No findings of obstruction.  Smith catheter to be placed today per urology. Atelectatic change in lower lobes bilaterally and small bibasilar pleural effusion. No new labs to review.   Blood cultures from 10/7 show E. coli.  Therapy recommends SNF, however daughter plans for patient to discharge home.  SW notes family plan to establish new PCP.  Required 30 mg (15 mg 10/9) oral morphine equivalent in the form of oxycodone over the last 24 hours.  Bowel and bladder incontinence.  Last BM 10/10 on daily lactulose and twice daily miralax. Laying in bed without distress with eyes closed. No family at bedside.     Review of Systems   Constitutional: Positive for decreased appetite and malaise/fatigue.   Respiratory:  Negative for shortness of breath.    Musculoskeletal:  Positive for myalgias.   Genitourinary:  Positive for incomplete emptying.   Neurological:  Positive for weakness.   Psychiatric/Behavioral:  Positive for  altered mental status.      Pain Assessment  Preferred Pain Scale: number (Numeric Rating Pain Scale)  Nonverbal Indicators of Pain: agitated, restless, moaning  CPOT Facial Expression: 0-->relaxed, neutral  CPOT Body Movements: 0-->absence of movements  CPOT Muscle Tension: 0-->relaxed  Ventilator Compliance/Vocalization: 0-->talking in normal tone or no sound  CPOT Score: 0  Pain Location: hip  Pain Description: constant, deep    O:     Intake/Output Summary (Last 24 hours) at 10/10/2024 0809  Last data filed at 10/10/2024 0557  Gross per 24 hour   Intake 240 ml   Output 1850 ml   Net -1610 ml       Diagnostics and current medications: Reviewed.      Current Facility-Administered Medications:     acetaminophen (TYLENOL) tablet 650 mg, 650 mg, Oral, Q4H PRN, 650 mg at 10/07/24 1056 **OR** acetaminophen (TYLENOL) 160 MG/5ML oral solution 650 mg, 650 mg, Oral, Q4H PRN, 650 mg at 10/06/24 1232 **OR** acetaminophen (TYLENOL) suppository 650 mg, 650 mg, Rectal, Q4H PRN, Cuba Espitia MD    atorvastatin (LIPITOR) tablet 40 mg, 40 mg, Oral, Daily, Quentin Abernathy DO, 40 mg at 10/09/24 0850    sennosides-docusate (PERICOLACE) 8.6-50 MG per tablet 2 tablet, 2 tablet, Oral, BID PRN, 2 tablet at 10/07/24 1505 **AND** [DISCONTINUED] polyethylene glycol (MIRALAX) packet 17 g, 17 g, Oral, Daily PRN **AND** bisacodyl (DULCOLAX) EC tablet 5 mg, 5 mg, Oral, Daily PRN **AND** bisacodyl (DULCOLAX) suppository 10 mg, 10 mg, Rectal, Daily PRN, Cuba Espitia MD    carvedilol (COREG) tablet 12.5 mg, 12.5 mg, Oral, Q12H, Zeinab Trujillo MD, 12.5 mg at 10/09/24 2101    cefTRIAXone (ROCEPHIN) 2,000 mg in sodium chloride 0.9 % 100 mL MBP, 2,000 mg, Intravenous, Q24H, Quentin Abernathy DO, Last Rate: 200 mL/hr at 10/09/24 0850, 2,000 mg at 10/09/24 0850    cholecalciferol (VITAMIN D3) tablet 800 Units, 800 Units, Oral, Daily, Quentin Abernathy DO, 800 Units at 10/09/24 0852    donepezil (ARICEPT)  tablet 10 mg, 10 mg, Oral, Nightly, Quentin Abernathy, DO, 10 mg at 10/09/24 2102    heparin (porcine) 5000 UNIT/ML injection 5,000 Units, 5,000 Units, Subcutaneous, Q8H, Quentin Abernathy, DO, 5,000 Units at 10/10/24 0518    lactulose solution 20 g, 20 g, Oral, Daily, Quentin Abernathy, DO, 20 g at 10/09/24 0852    levothyroxine (SYNTHROID, LEVOTHROID) tablet 50 mcg, 50 mcg, Oral, Q AM, Quentin Abernathy, DO, 50 mcg at 10/10/24 0518    melatonin tablet 10 mg, 10 mg, Oral, Nightly PRN, Han Gibbs APRN, 10 mg at 10/07/24 2050    oxyCODONE (ROXICODONE) immediate release tablet 10 mg, 10 mg, Oral, Q4H PRN, Quentin Abernathy DO, 10 mg at 10/10/24 0430    pantoprazole (PROTONIX) EC tablet 40 mg, 40 mg, Oral, Q AM, Quentin Abernathy, DO, 40 mg at 10/10/24 0518    polyethylene glycol (MIRALAX) packet 17 g, 17 g, Oral, BID, Quentin Abernathy, DO, 17 g at 10/09/24 0851    sertraline (ZOLOFT) tablet 25 mg, 25 mg, Oral, Daily, Quentin Abernathy DO, 25 mg at 10/09/24 0851    sodium chloride 0.9 % flush 10 mL, 10 mL, Intravenous, PRN, Ta Castillo MD    sodium chloride 0.9 % flush 10 mL, 10 mL, Intravenous, Q12H, Cuba Espitia MD, 10 mL at 10/09/24 2108    sodium chloride 0.9 % flush 10 mL, 10 mL, Intravenous, PRN, Cuba Espitia MD    Allergies   Allergen Reactions    Ropinirole Hcl Shortness Of Breath    Codeine Itching and Mental Status Change    Definity [Perflutren Lipid Microsphere] Other (See Comments)     Severe back pain    Ambien [Zolpidem] Other (See Comments)     HYPER     Eszopiclone Other (See Comments)     MAKES PT HYPER     Pregabalin Dizziness    Tizanidine Other (See Comments)     Terrible nightmares     I have utilized all available immediate resources to obtain, update, or review the patient's current medications (including all prescriptions, over-the-counter products, herbals, cannabis/cannabidiol products, and  "vitamin/mineral/dietary (nutritional) supplements) for name, route of administration, type, dose and frequency.    A:    /70 (BP Location: Left arm, Patient Position: Lying) Comment: nurse notified  Pulse 87   Temp 97.9 °F (36.6 °C) (Oral)   Resp 16   Ht 157.5 cm (62\")   Wt 85.9 kg (189 lb 4.8 oz)   LMP  (LMP Unknown)   SpO2 98%   BMI 34.62 kg/m²     Vitals and nursing note reviewed.   Constitutional:       Appearance: Not in distress. Ill-appearing and chronically ill-appearing.   Eyes:      General: Lids are normal.   HENT:      Head: Normocephalic.   Pulmonary:      Effort: Pulmonary effort is normal.   Cardiovascular:      Normal rate.   Edema:     Peripheral edema absent.   Musculoskeletal: Normal range of motion.      Cervical back: Neck supple.   Skin:     General: Skin is warm.   Genitourinary:     Comments: External urinary catheter in place  Neurological:      Mental Status: sleeping  Psychiatric:         Mood and Affect: appears calm     Patient status: Disease state: Controlled with current treatments.  Current Functional status: Palliative Performance Scale Score: Performance 30% based on the following measures: Ambulation: Totally bed bound, Activity and Evidence of Disease: Unable to do any work, extensive evidence of disease, Self-Care: Total care required,  Intake: Reduced, LOC: Full, drowsy or confusion   Baseline Functional status: Palliative Performance Scale Score: Performance 70% based on the following measures: Ambulation: Reduced, Activity and Evidence of Disease: Unable to do normal work, some evidence of disease, Self-Care: Fully independent,  Intake: Normal or reduced, LOC: Full   Baseline ECOG Status(2) Ambulatory and capable of self care, unable to carry out work activity, up and about > 50% or waking hours.  Nutritional status: Albumin 2.3 Body mass index is 32.08 kg/m².      Hospital Problem List      Sepsis    Sepsis due to urinary tract infection     Impression/Problem " List:     Cardiopulmonary arrest  Alzheimer's dementia  Sepsis due to bacteremia-E. 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 Garcia  Depression  Hypothyroidism  Fibromyalgia  GERD  Hyperlipidemia  Hypertension  Incontinence  Restless leg syndrome  Obstructive sleep apnea-not using CPAP  Lumbar stenosis  Migraines  Peptic ulcer   Advanced age     Recommendations/Plan:  1. plan: Goals of care include CODE STATUS CPR/full support interventions.     Family support: The patient receives support from her  and daughter..  Advance Directives: Not on file     POA/Healthcare surrogate-spouse and daughter-next of kin.     2.  Palliative care encounter  - Prognosis is guarded long-term secondary to cardiopulmonary arrest, Alzheimer's dementia, sepsis, acute kidney injury, adenocarcinoma of endometrium, multiple comorbidities, and advanced age.  -Patient and family appears to have fair prognostic awareness.      -Apparently patient admitted as DNR/DO NOT INTUBATE.  Transferred to CT and experience cardiopulmonary arrest and a CODE BLUE was called and received CPR x 5-10 minutes per report.  Placed in code chill protocol at 0501.  Admitted to critical care unit on ventilator support with sepsis.  Started on IV fluids and IV antibiotics.  Hypothermia protocol discontinued.  Acute kidney injury felt likely multifactorial.    10/6-unit of PRBC 10/6.    -Extubated to nasal cannula.    -Evaluated by speech therapy due to mild oral dysphagia and started on thin liquid soft to chew solid with chopped meat diet.    -Therapy recommends SNF.   -Anticipating hospice at discharge per intensivist.   10/7-Urology consulted due to bilateral hydronephrosis and UTI, recommends following to determine bladder emptying, bowel management, and  broad-spectrum antibiotics x 14 days total for complicated UTI.    10/8-Due to postvoid residuals greater than 200 a Mcclain catheter has been replaced today and plans for repeat CT in 48 hours.  10/10-Repeat CT shows persistent and unchanged bilateral moderate hydronephrosis and hydroureter, and moderately distended urinary bladder. Mcclain catheter to be placed.      10/7-clarified CODE STATUS CPR/full support interventions    10/10-continue supportive measures  -Urology following, a Mcclain catheter to be placed due to incomplete emptying. Repeat CT 10/10-shows persistent bilateral hydronephrosis, mcclain catheter to be placed  -Anticipating home. Therapy recommends SNF. SW notes family plan to establish new PCP, as this will prevent discharge with home health until established.  -At risk for rehospitalization's and decline given multiple comorbid factors and cardiopulmonary arrest event  -Will plan to follow up early next week or sooner if needed.      Thank you for allowing us to participate in patient's plan of care. Palliative Care Team will continue to follow patient.     MORGAN Sorensen  10/10/2024  08:09 CDT

## 2024-10-10 NOTE — PLAN OF CARE
Goal Outcome Evaluation:  Plan of Care Reviewed With: patient        Progress: improving  Outcome Evaluation: PT tx completed. Pt c/o B knees with mobilizing, but once she is able to move them with range of motion they improve. ModA sup<>sit, sit EOB SBA. Performed A/AAROM BLE's x 15-20 reps. Attempted standing utilizing r wx. Pt able to partial stand, but does not put in all her effort to assist. At this time she is unable to walk or transfer. Recommend SNF

## 2024-10-10 NOTE — THERAPY TREATMENT NOTE
Acute Care - Physical Therapy Treatment Note  [facility]     Patient Name: Jennifer Gomes  : 1940  MRN: [id number]  Today's Date: 10/10/2024      Visit Dx:     ICD-10-CM ICD-9-CM   1. Sepsis without acute organ dysfunction, due to unspecified organism  A41.9 038.9     995.91   2. Acute cystitis without hematuria  N30.00 595.0   3. Gastroenteritis  K52.9 558.9   4. Acute metabolic encephalopathy  G93.41 348.31   5. Acute respiratory failure with hypoxia  J96.01 518.81   6. Dysphagia, unspecified type [R13.10]  R13.10 787.20   7. Impaired mobility [Z74.09]  Z74.09 799.89     Patient Active Problem List   Diagnosis    Gastroesophageal reflux disease    Spinal stenosis, lumbar region, without neurogenic claudication    Erosion of vaginal mesh    Adenocarcinoma of endometrium    S/P hysterectomy with oophorectomy    Encounter for follow-up surveillance of endometrial cancer    History of radiation therapy    Non-traumatic rhabdomyolysis    Acute renal failure superimposed on stage 3 chronic kidney disease    Medical non-compliance, does not take narcotics as prescribed    Chronic constipation    Chronic pain syndrome    Chronic prescription opiate use    Anemia    Hypothyroidism (acquired)    Essential hypertension    Venous insufficiency of both lower extremities    Chronic intractable headache    RAFAL (obstructive sleep apnea)    Acute encephalopathy due to UTI    Toxic metabolic encephalopathy    Polypharmacy    Frequent falls    Grade III internal hemorrhoids    External hemorrhoids    Colitis    Moderate malnutrition    Transaminitis    Acute UTI (urinary tract infection)    Polypharmacy    UTI (urinary tract infection)    Dementia    Hypokalemia    Sepsis due to urinary tract infection    Cardiopulmonary arrest    E coli bacteremia    Anemia requiring transfusions    Bilateral hydronephrosis     Past Medical History:   Diagnosis Date    Anxiety     Arthritis     Bronchitis     Cancer     uterine    Chronic  pain     Depression     Disease of thyroid gland     Fibromyalgia     GERD (gastroesophageal reflux disease)     Headache     History of transfusion     AS     Hyperlipidemia     Hypertension     Incontinence     Insomnia     Leg pain     Lumbar stenosis     Migraines     Peptic ulcer     Restless legs     Sleep apnea     NO C-PAP    UTI (urinary tract infection)     Vaginal bleeding      Past Surgical History:   Procedure Laterality Date    BLADDER REPAIR      MESH HAD TO BE REMOVED IN 2013    BREAST BIOPSY Right 2017    benign    BREAST CYST EXCISION Left 1982    CARDIAC CATHETERIZATION      CARPAL TUNNEL RELEASE      CATARACT EXTRACTION W/ INTRAOCULAR LENS  IMPLANT, BILATERAL      CHOLECYSTECTOMY WITH INTRAOPERATIVE CHOLANGIOGRAM N/A 2023    Procedure: CHOLECYSTECTOMY LAPAROSCOPIC INTRAOPERATIVE CHOLANGIOGRAM;  Surgeon: Gerardo Arciniega MD;  Location: [facility] OR;  Service: General;  Laterality: N/A;    COLONOSCOPY      COLONOSCOPY N/A 10/01/2021    Procedure: COLONOSCOPY WITH ANESTHESIA;  Surgeon: Tom Velasco DO;  Location: [facility] ENDOSCOPY;  Service: Gastroenterology;  Laterality: N/A;  pre: change in bowel habits  post: diverticulosis. hemorrhoids.   Olivia Mora APRN        CYSTECTOMY      D & C HYSTEROSCOPY N/A 2017    Procedure: DILATATION AND CURETTAGE HYSTEROSCOPY;  Surgeon: Shasta Madrigal MD;  Location: [facility] OR;  Service:     DILATION AND CURETTAGE, DIAGNOSTIC / THERAPEUTIC      ENDOSCOPY  2010    Short segment of Arriola's,Moderate chroninc esophagogastritis and negative H.pylori    ENDOSCOPY N/A 2017    Procedure: ESOPHAGOGASTRODUODENOSCOPY WITH ANESTHESIA;  Surgeon: Tom Velasco DO;  Location: [facility] ENDOSCOPY;  Service:     EYE SURGERY      RETINA    HEMORRHOIDECTOMY SIGMOIDOSCOPY N/A 3/21/2023    Procedure: HEMORRHOIDECTOMY WITH EXAM UNDER ANESTHESIA;  Surgeon: Holly Chavez MD;  Location: [facility] OR;  Service: General;  Laterality:  N/A;    HYSTERECTOMY  12/20/2017    ORIF TIBIA/FIBULA FRACTURES Left 2000    TRANSVAGINAL TAPING SUSPENSION N/A 11/06/2017    Procedure: VAGINAL MESH REVISION;  Surgeon: Shasta Madrigal MD;  Location:  [facility] OR;  Service:     VAGINAL MESH REVISION  2013     PT Assessment (Last 12 Hours)       PT Evaluation and Treatment       Row Name 10/10/24 1305          Physical Therapy Time and Intention    Subjective Information complains of;weakness;pain  -KJ     Document Type therapy note (daily note)  -KJ     Mode of Treatment physical therapy  -KJ     Patient Effort adequate  -KJ       Row Name 10/10/24 1305          General Information    Existing Precautions/Restrictions fall  -KJ       Row Name 10/10/24 1305          Pain    Pretreatment Pain Rating 6/10  -KJ     Posttreatment Pain Rating 6/10  -KJ     Pain Location - Side/Orientation Bilateral  -KJ     Pain Location - knee  -KJ       Row Name 10/10/24 1305          Bed Mobility    Supine-Sit [place] (Bed Mobility) verbal cues;moderate assist (50% patient effort)  -KJ     Sit-Supine [place] (Bed Mobility) minimum assist (75% patient effort)  -KJ       Row Name 10/10/24 1305          Sit-Stand Transfer    Sit-Stand [place] (Transfers) verbal cues;moderate assist (50% patient effort);2 person assist  -KJ     Assistive Device (Sit-Stand Transfers) walker, front-wheeled  -KJ     Comment, (Sit-Stand Transfer) partial quick stand  -KJ       Row Name 10/10/24 1305          Stand-Sit Transfer    Stand-Sit [place] (Transfers) moderate assist (50% patient effort)  -KJ       Row Name 10/10/24 1305          Balance    Balance Assessment sitting static balance  -KJ     Static Sitting Balance standby assist  -KJ     Dynamic Sitting Balance contact guard  -KJ     Position, Sitting Balance sitting edge of bed  -KJ       Row Name 10/10/24 1305          Aerobic Exercise    Comment, Aerobic Exercise (Therapeutic Exercise) A/AAROM  -KJ       Row Name 10/10/24 1305           Positioning and Restraints    Pre-Treatment Position in bed  -KJ     Post Treatment Position bed  -KJ     In Bed call light within reach  -KJ               User Key  (r) = Recorded By, (t) = Taken By, (c) = Cosigned By      Initials Name Provider Type    Yasemin Amezcua, PTA Physical Therapist Assistant                    Physical Therapy Education       Title: PT OT SLP Therapies (In Progress)       Topic: Physical Therapy (In Progress)       Point: Mobility training (In Progress)       Learning Progress Summary             Patient Acceptance, E, NR by  at 10/7/2024 1158    Comment: role of PT, call for assist, d/c planning, high fall risk                         Point: Home exercise program (In Progress)       Learning Progress Summary             Patient Acceptance, E, NR by  at 10/7/2024 1158    Comment: role of PT, call for assist, d/c planning, high fall risk                         Point: Body mechanics (In Progress)       Learning Progress Summary             Patient Acceptance, E, NR by  at 10/7/2024 1158    Comment: role of PT, call for assist, d/c planning, high fall risk                         Point: Precautions (In Progress)       Learning Progress Summary             Patient Acceptance, E, NR by  at 10/7/2024 1158    Comment: role of PT, call for assist, d/c planning, high fall risk                                         User Key       Initials Effective Dates Name Provider Type Discipline     08/15/24 -  Sunny Esquivel, PT DPT Physical Therapist PT                  PT Recommendation and Plan     Plan of Care Reviewed With: patient  Progress: improving  Outcome Evaluation: PT tx completed. Pt c/o B knee/back pn. Yelled out with movement of LE's. Slowly performed AAROM in supine to BLE and this seemed to help. Bed mobility Min/ModA. Sit edge of bed x 20 minutes S. Attempted to stand, but pt did not put much effort into it then said she was done. Recommend SNF   Outcome Measures        Row Name 10/08/24 0916             How much help from another person do you currently need...    Turning from your back to your side while in flat bed without using bedrails? 2  -MF      Moving from lying on back to sitting on the side of a flat bed without bedrails? 2  -MF      Moving to and from a bed to a chair (including a wheelchair)? 1  -MF      Standing up from a chair using your arms (e.g., wheelchair, bedside chair)? 2  -MF      Climbing 3-5 steps with a railing? 1  -MF      To walk in hospital room? 1  -MF      AM-PAC 6 Clicks Score (PT) 9  -MF      Highest Level of Mobility Goal 3 --> Sit at edge of bed  -MF         Functional Assessment    Outcome Measure Options AM-PAC 6 Clicks Basic Mobility (PT)  -                User Key  (r) = Recorded By, (t) = Taken By, (c) = Cosigned By      Initials Name Provider Type    Marisol Juarez, PTA Physical Therapist Assistant                     Time Calculation:    PT Charges       Row Name 10/10/24 1330             Time Calculation    Start Time 1305  -KJ      Stop Time 1331  -KJ      Time Calculation (min) 26 min  -KJ      PT Received On 10/10/24  -KJ      PT Goal Re-Cert Due Date 10/17/24  -KJ         Time Calculation- PT    Total Timed Code Minutes- PT 26 minute(s)  -KJ                User Key  (r) = Recorded By, (t) = Taken By, (c) = Cosigned By      Initials Name Provider Type    Yasemin Amezcua, CHANDLER Physical Therapist Assistant                  Therapy Charges for Today       Code Description Service Date Service Provider Modifiers Qty    94904124836 HC PT THER PROC EA 15 MIN 10/9/2024 Yasemin Puri, PTA GP 1    63438677878 HC PT THERAPEUTIC ACT EA 15 MIN 10/9/2024 Yasemin Puri, PTA GP 2    99315481002 HC PT THER PROC EA 15 MIN 10/10/2024 Yasemin Puri, PTA GP 1    98643626018 HC PT THERAPEUTIC ACT EA 15 MIN 10/10/2024 Yasemin Puri, PTA GP 1            PT G-Codes  Outcome Measure Options: AM-PAC 6 Clicks Basic Mobility (PT)  AM-PAC 6  Clicks Score (PT): 9  AM-PAC 6 Clicks Score (OT): 11    Yasemin Puri, PTA  10/10/2024

## 2024-10-10 NOTE — PROGRESS NOTES
[facility]  INPATIENT PROGRESS NOTE    Patient Name: Jennifer Gomes  Date of Admission: 10/5/2024  Today's Date: 10/10/24  Length of Stay: 5  Primary Care Physician: Provider, No Known    Subjective   Chief Complaint: Urinary retention  HPI     Smith was removed by nursing yesterday then urinary retention developed once again.  CT scan of the abdomen pelvis revealed a distended bladder with unchanged bilateral hydronephrosis.  CT also revealed a large rectal stool burden still present which is likely significantly contributing to the patient's urinary retention.  She was given a bowel regimen at the time of admission and has not responded.  Milk molasses enema will be ordered for today.  The patient's daughter still plans to take her home with home health.  Creatinine improved to 1.80.  BUN 44.  White blood cell count within normal limits with hemoglobin stable at 7.6.    Review of Systems   All pertinent negatives and positives are as above. All other systems have been reviewed and are negative unless otherwise stated.     Objective    Temp:  [97.7 °F (36.5 °C)-97.9 °F (36.6 °C)] 97.9 °F (36.6 °C)  Heart Rate:  [80-87] 87  Resp:  [16] 16  BP: (161-175)/(61-76) 175/70  Physical Exam    Constitutional:       Appearance: Normal appearance. She is normal weight.   HENT:      Head: Normocephalic and atraumatic.      Right Ear: External ear normal.      Left Ear: External ear normal.      Nose: Nose normal.      Mouth: Mucous membranes are moist.      Pharynx: Oropharynx is clear.   Eyes:      General: No scleral icterus.     Conjunctiva/sclera: Conjunctivae normal.   Cardiovascular:      Rate and Rhythm: Normal rate and regular rhythm.      Pulses: Normal pulses.      Heart sounds: Normal heart sounds. No murmur heard.  Pulmonary:      Effort: Pulmonary effort is normal. No respiratory distress.      Breath sounds: Normal breath sounds.   Abdominal:      General:  Abdomen is flat. Bowel sounds are normal.      Palpations: Abdomen is soft. There is no mass.      Tenderness: There is no abdominal tenderness.   Musculoskeletal:         General: Normal range of motion.   Skin:     General: Skin is warm and dry.   Neurological:      General: No focal deficit present.      Mental Status: She is alert and oriented to person, place, and time.   Psychiatric:         Mood and Affect: Mood normal.         Judgment: Judgment normal.     Results Review:  I have reviewed the labs, radiology results, and diagnostic studies.    Laboratory Data:   Results from last 7 days   Lab Units 10/09/24  0439 10/08/24  0216 10/07/24  1234 10/07/24  0220   WBC 10*3/mm3 7.93 10.92*  --  12.52*   HEMOGLOBIN g/dL 7.6* 7.8* 7.4* 7.2*   HEMATOCRIT % 24.5* 26.3* 23.8* 23.2*   PLATELETS 10*3/mm3 155 125*  --  127*        Results from last 7 days   Lab Units 10/09/24  0439 10/08/24  0216 10/07/24  0220 10/05/24  0927 10/05/24  0551 10/05/24  0201   SODIUM mmol/L 138 137 137   < > 135* 133*   POTASSIUM mmol/L 3.9 4.0 3.6   < > 4.0 4.5   CHLORIDE mmol/L 106 109* 107   < > 99 97*   CO2 mmol/L 21.0* 18.0* 21.0*   < > 20.0* 22.0   BUN mg/dL 44* 47* 43*   < > 43* 47*   CREATININE mg/dL 1.80* 2.18* 2.18*   < > 2.13* 2.18*   CALCIUM mg/dL 9.1 8.7 8.2*   < > 8.7 9.0   BILIRUBIN mg/dL  --   --   --   --  0.5 0.5   ALK PHOS U/L  --   --   --   --  83 70   ALT (SGPT) U/L  --   --   --   --  20 12   AST (SGOT) U/L  --   --   --   --  48* 19   GLUCOSE mg/dL 124* 134* 138*   < > 187* 165*    < > = values in this interval not displayed.       Culture Data:   Blood Culture   Date Value Ref Range Status   10/07/2024 No growth at 2 days  Preliminary   10/07/2024 No growth at 3 days  Preliminary   10/05/2024 Escherichia coli (C)  Final   10/05/2024 Escherichia coli (C)  Final     Urine Culture   Date Value Ref Range Status   10/05/2024 >100,000 CFU/mL Morganella morganii ssp morganii (A)  Final       Radiology Data:   Imaging  Results (Last 24 Hours)       Procedure Component Value Units Date/Time    CT Abdomen Pelvis Without Contrast [[id number]] Collected: 10/10/24 0807     Updated: 10/10/24 0825    Narrative:      EXAMINATION: CT ABDOMEN PELVIS WO CONTRAST-      10/10/2024 6:56 AM     HISTORY: Follow-up bilateral hydronephrosis; A41.9-Sepsis, unspecified  organism; N30.00-Acute cystitis without hematuria; K52.9-Noninfective  gastroenteritis and colitis, unspecified; G93.41-Metabolic  encephalopathy; J96.01-Acute respiratory failure with hypoxia;  R13.10-Dysphagia, unspecified; Z74.09-Other reduced mobility     In order to have a CT radiation dose as low as reasonably achievable  Automated Exposure Control was utilized for adjustment of the mA and/or  KV according to patient size.     Total DLP = 439.22 mGy.cm     CT scan of the abdomen and pelvis should performed without intravenous  contrast enhancement.     The images are acquired in axial plane and subsequent reconstruction  with coronal and sagittal planes.     The comparison is made with the previous study dated 10/5/2024.     Lung bases included in the study show atelectatic change in the lower  lungs right more than the left. There is small bibasilar pleural  effusion.     Limited visualized cardiomediastinal structures show heavy calcification  mitral annulus. Atheromatous change of thoracic aorta. No aneurysmal  dilatation. Limited visualized coronary arteries show no significant  calcific plaques. There is moderate cardiomegaly.     The unenhanced liver and spleen are unremarkable. An isodense  abnormality or a mass may not be evaluated or excluded.     The gallbladder is surgically absent.     Unenhanced pancreas appears unremarkable.     The adrenal glands bilaterally are unremarkable.     A relatively small right kidney is seen. Normal sized left kidney. An  isodense lesion in either kidney may not be evaluated or excluded. There  is moderate bilateral perinephric fat  infiltration similar to the  previous study. No radiopaque calculi. There is persistent moderate  hydronephrosis and hydroureter bilaterally. No radiopaque calculi in  either ureter. Urinary bladder is moderately distended similar to the  previous study. No intrinsic abnormality.     The stomach is decompressed with moderate symmetrical wall thickening.  No focal abnormality Alamast. Duodenum is normal. Small bowel is  nondistended. No finding to suggest appendicitis. Significant large  volume stool in the colon, more severely in the rectum which  significantly more excessive since the previous study. There is moderate  rectal wall thickening with surrounding fat infiltration.     Atheromatous change of the abdominal aorta and iliac arteries. No  aneurysmal dilatation.     There is no evidence of abdominal or pelvic lymphadenopathy.     There is small fat-containing umbilical hernia.     Images reviewed in bone window show chronic degenerative changes of the  lumbar spine with mild dextroscoliosis. There is grade 1  spondylolisthesis L4-5 without evidence of spondylolysis. Severe  degeneration the disc L2-3 is noted.       Impression:      1. Persistent and unchanged bilateral moderate hydronephrosis and  hydroureter and moderately distended urinary bladder. There is a  probable bilateral vesicoureteral reflux as suggested previously. No  obstructing calculi.  2. Significant large volume stool throughout the colon, more severely in  the rectum. Moderate rectal wall thickening with surrounding fat  infiltration. This may represent stercoral proctitis. No finding to  suggest obstruction.  3. Atelectatic change in the lower lungs bilaterally and small bibasilar  pleural effusion.  4. Nonacute findings of the remaining abdomen is similar to the previous  study.                                                  This report was signed and finalized on 10/10/2024 8:22 AM by Dr. Marty Surehs MD.               I have  reviewed the patient's current medications.     Assessment/Plan   Assessment  Active Hospital Problems    Diagnosis     **Sepsis due to urinary tract infection     Cardiopulmonary arrest     E coli bacteremia     Anemia requiring transfusions     Bilateral hydronephrosis     Acute renal failure superimposed on stage 3 chronic kidney disease        Treatment Plan  Continue therapies  Continue IV antibiotics with Rocephin  Will transition to cefdinir at discharge  Urology to reassess tomorrow  Milk and molasses enema     Medical Decision Making  Number and Complexity of problems:   6+ acute, high complexity problems     Differential Diagnosis: None     Conditions and Status        Condition is improving.     [facility] Data  External documents reviewed: See HPI  Cardiac tracing (EKG, telemetry) interpretation: See HPI  Radiology interpretation: See HPI  Labs reviewed: See HPI  Any tests that were considered but not ordered: None     Decision rules/scores evaluated (example [id number], Wells, etc): None     Discussed with: The patient     Care Planning  Shared decision making: The patient and her daughter  Code status and discussions: Full code     Disposition  Social Determinants of Health that impact treatment or disposition: None  I expect the patient to be discharged to home in 2-3 days.    Electronically signed by Lonnie Murphy DO, 10/10/24, 16:39 CDT.

## 2024-10-11 LAB
ALBUMIN SERPL-MCNC: 2.3 G/DL (ref 3.5–5.2)
ANION GAP SERPL CALCULATED.3IONS-SCNC: 8 MMOL/L (ref 5–15)
BUN SERPL-MCNC: 36 MG/DL (ref 8–23)
BUN/CREAT SERPL: 22.8 (ref 7–25)
CALCIUM SPEC-SCNC: 9.2 MG/DL (ref 8.6–10.5)
CHLORIDE SERPL-SCNC: 106 MMOL/L (ref 98–107)
CO2 SERPL-SCNC: 25 MMOL/L (ref 22–29)
CREAT SERPL-MCNC: 1.58 MG/DL (ref 0.57–1)
EGFRCR SERPLBLD CKD-EPI 2021: 32.6 ML/MIN/1.73
GLUCOSE BLDC GLUCOMTR-MCNC: 132 MG/DL (ref 70–130)
GLUCOSE SERPL-MCNC: 127 MG/DL (ref 65–99)
PHOSPHATE SERPL-MCNC: 2.9 MG/DL (ref 2.5–4.5)
POTASSIUM SERPL-SCNC: 3.4 MMOL/L (ref 3.5–5.2)
SODIUM SERPL-SCNC: 139 MMOL/L (ref 136–145)

## 2024-10-11 PROCEDURE — 97530 THERAPEUTIC ACTIVITIES: CPT

## 2024-10-11 PROCEDURE — 97110 THERAPEUTIC EXERCISES: CPT

## 2024-10-11 PROCEDURE — 82948 REAGENT STRIP/BLOOD GLUCOSE: CPT

## 2024-10-11 PROCEDURE — 92507 TX SP LANG VOICE COMM INDIV: CPT

## 2024-10-11 PROCEDURE — 80069 RENAL FUNCTION PANEL: CPT | Performed by: UROLOGY

## 2024-10-11 PROCEDURE — 99232 SBSQ HOSP IP/OBS MODERATE 35: CPT | Performed by: UROLOGY

## 2024-10-11 PROCEDURE — 25010000002 CEFTRIAXONE PER 250 MG: Performed by: INTERNAL MEDICINE

## 2024-10-11 PROCEDURE — 25010000002 HEPARIN (PORCINE) PER 1000 UNITS: Performed by: INTERNAL MEDICINE

## 2024-10-11 RX ORDER — POTASSIUM CHLORIDE 750 MG/1
20 CAPSULE, EXTENDED RELEASE ORAL EVERY 4 HOURS
Status: COMPLETED | OUTPATIENT
Start: 2024-10-11 | End: 2024-10-11

## 2024-10-11 RX ADMIN — LEVOTHYROXINE SODIUM 50 MCG: 100 TABLET ORAL at 05:13

## 2024-10-11 RX ADMIN — POTASSIUM CHLORIDE 20 MEQ: 750 CAPSULE, EXTENDED RELEASE ORAL at 22:02

## 2024-10-11 RX ADMIN — DOCUSATE SODIUM 50 MG AND SENNOSIDES 8.6 MG 2 TABLET: 8.6; 5 TABLET, FILM COATED ORAL at 22:03

## 2024-10-11 RX ADMIN — CARVEDILOL 12.5 MG: 6.25 TABLET, FILM COATED ORAL at 08:06

## 2024-10-11 RX ADMIN — OXYCODONE HYDROCHLORIDE 10 MG: 5 TABLET ORAL at 02:02

## 2024-10-11 RX ADMIN — POLYETHYLENE GLYCOL 3350 17 G: 17 POWDER, FOR SOLUTION ORAL at 08:06

## 2024-10-11 RX ADMIN — PANTOPRAZOLE SODIUM 40 MG: 40 TABLET, DELAYED RELEASE ORAL at 05:13

## 2024-10-11 RX ADMIN — Medication 10 ML: at 08:12

## 2024-10-11 RX ADMIN — DONEPEZIL HYDROCHLORIDE 10 MG: 10 TABLET, FILM COATED ORAL at 22:02

## 2024-10-11 RX ADMIN — POLYETHYLENE GLYCOL 3350 17 G: 17 POWDER, FOR SOLUTION ORAL at 22:03

## 2024-10-11 RX ADMIN — HEPARIN SODIUM 5000 UNITS: 5000 INJECTION, SOLUTION INTRAVENOUS; SUBCUTANEOUS at 22:02

## 2024-10-11 RX ADMIN — HEPARIN SODIUM 5000 UNITS: 5000 INJECTION, SOLUTION INTRAVENOUS; SUBCUTANEOUS at 13:14

## 2024-10-11 RX ADMIN — CARVEDILOL 12.5 MG: 6.25 TABLET, FILM COATED ORAL at 22:03

## 2024-10-11 RX ADMIN — DOCUSATE SODIUM 50 MG AND SENNOSIDES 8.6 MG 2 TABLET: 8.6; 5 TABLET, FILM COATED ORAL at 08:06

## 2024-10-11 RX ADMIN — OXYCODONE HYDROCHLORIDE 10 MG: 5 TABLET ORAL at 08:12

## 2024-10-11 RX ADMIN — HEPARIN SODIUM 5000 UNITS: 5000 INJECTION, SOLUTION INTRAVENOUS; SUBCUTANEOUS at 05:13

## 2024-10-11 RX ADMIN — Medication 10 ML: at 22:03

## 2024-10-11 RX ADMIN — SERTRALINE HYDROCHLORIDE 25 MG: 50 TABLET ORAL at 08:06

## 2024-10-11 RX ADMIN — CEFTRIAXONE SODIUM 2000 MG: 2 INJECTION, POWDER, FOR SOLUTION INTRAMUSCULAR; INTRAVENOUS at 08:07

## 2024-10-11 RX ADMIN — LACTULOSE 20 G: 20 SOLUTION ORAL at 08:11

## 2024-10-11 RX ADMIN — CHOLECALCIFEROL TAB 10 MCG (400 UNIT) 800 UNITS: 10 TAB at 08:11

## 2024-10-11 RX ADMIN — ATORVASTATIN CALCIUM 40 MG: 40 TABLET, FILM COATED ORAL at 08:06

## 2024-10-11 RX ADMIN — OXYCODONE HYDROCHLORIDE 10 MG: 5 TABLET ORAL at 13:14

## 2024-10-11 RX ADMIN — POTASSIUM CHLORIDE 20 MEQ: 750 CAPSULE, EXTENDED RELEASE ORAL at 19:38

## 2024-10-11 NOTE — PROGRESS NOTES
LOS: 6 days   Patient Care Team:  Provider, No Known as PCP - General  [facility]  Eleazar Ramires MD as Cardiologist (Cardiology)  Shasta Madrigal MD as Referring Physician (Obstetrics and Gynecology)  Holly Chavez MD as Consulting Physician (General Surgery)    Chief Complaint:  bilateral hydronephrosis, incomplete emptying of the bladder, constipation    Subjective     Interval History:     The patient is tolerating her Smith. Producing adequate clear yellow urine.     Review of Systems  Pertinent items are noted in HPI, all other systems reviewed and negative     Objective     Vital Signs  Temp:  [97.6 °F (36.4 °C)-98.1 °F (36.7 °C)] 98.1 °F (36.7 °C)  Heart Rate:  [71-87] 82  Resp:  [16] 16  BP: (159-181)/(59-72) 170/65    Physical Exam:  Comfortable. Smith in place, draining clear yellow urine.      Data Review:       I have reviewed the following data:    I have reviewed her renal function panel from this morning. Creatinine improving.     Medication Review:     Current Facility-Administered Medications:     acetaminophen (TYLENOL) tablet 650 mg, 650 mg, Oral, Q4H PRN, 650 mg at 10/07/24 1056 **OR** acetaminophen (TYLENOL) 160 MG/5ML oral solution 650 mg, 650 mg, Oral, Q4H PRN, 650 mg at 10/06/24 1232 **OR** acetaminophen (TYLENOL) suppository 650 mg, 650 mg, Rectal, Q4H PRN, Cuba Espitia MD    atorvastatin (LIPITOR) tablet 40 mg, 40 mg, Oral, Daily, Quentin Abernathy DO, 40 mg at 10/11/24 0806    sennosides-docusate (PERICOLACE) 8.6-50 MG per tablet 2 tablet, 2 tablet, Oral, BID, 2 tablet at 10/11/24 0806 **AND** bisacodyl (DULCOLAX) EC tablet 5 mg, 5 mg, Oral, Daily PRN **AND** bisacodyl (DULCOLAX) suppository 10 mg, 10 mg, Rectal, Daily PRN, Lonnie Murphy DO    carvedilol (COREG) tablet 12.5 mg, 12.5 mg, Oral, Q12H, Zeinab Trujillo MD, 12.5 mg at 10/11/24 0806    cefTRIAXone (ROCEPHIN) 2,000 mg in sodium chloride 0.9 % 100 mL MBP, 2,000 mg, Intravenous, Q24H,  Quentin Abernathy DO, Last Rate: 200 mL/hr at 10/11/24 0807, 2,000 mg at 10/11/24 0807    cholecalciferol (VITAMIN D3) tablet 800 Units, 800 Units, Oral, Daily, Quentin Abernathy DO, 800 Units at 10/11/24 0811    donepezil (ARICEPT) tablet 10 mg, 10 mg, Oral, Nightly, Quentin Abernathy DO, 10 mg at 10/10/24 2126    heparin (porcine) 5000 UNIT/ML injection 5,000 Units, 5,000 Units, Subcutaneous, Q8H, Quentin Abernathy DO, 5,000 Units at 10/11/24 0513    lactulose solution 20 g, 20 g, Oral, Daily, Quentin Abernathy DO, 20 g at 10/11/24 0811    levothyroxine (SYNTHROID, LEVOTHROID) tablet 50 mcg, 50 mcg, Oral, Q AM, Quentin Abernathy DO, 50 mcg at 10/11/24 0513    melatonin tablet 10 mg, 10 mg, Oral, Nightly PRN, Han Gibbs, APRN, 10 mg at 10/10/24 2126    oxyCODONE (ROXICODONE) immediate release tablet 10 mg, 10 mg, Oral, Q4H PRN, Quentin Abernathy DO, 10 mg at 10/11/24 0812    pantoprazole (PROTONIX) EC tablet 40 mg, 40 mg, Oral, Q AM, Quentin Abernathy DO, 40 mg at 10/11/24 0513    polyethylene glycol (MIRALAX) packet 17 g, 17 g, Oral, BID, Quentin Abernathy DO, 17 g at 10/11/24 0806    sertraline (ZOLOFT) tablet 25 mg, 25 mg, Oral, Daily, Quentin Abernathy DO, 25 mg at 10/11/24 0806    sodium chloride 0.9 % flush 10 mL, 10 mL, Intravenous, Q12H, Cuba Espitia MD, 10 mL at 10/11/24 0812    sodium chloride 0.9 % flush 10 mL, 10 mL, Intravenous, PRN, Cuba Espitia MD    Assessment and Plan:    Bilateral hydronephrosis: Asymptomatic. Likely related to urinary retention. Will need repeat imaging with Smith in place. Will order outpatient renal ultrasound prior to in-office voiding trial.    Urinary retention: Continue Smith. Discharge with Smith. Continue bowel management as rectal stool in likely contributing.     Recurrent UTI: Finish antibiotic course. Recommend 14 days total of culture specific antibiotics for  complicated UTI. Consider OTC cranberry supplement. Increase water intake if appropriate from medical standpoint. Bowel management.     Constipation: Will need regular bowel management, not just a stool softener. Consider daily Miralax or another daily treatment.    TOMÁS: Improving with Smith. If bladder emptying improves, may be okay without Smith.     URO DISPO: Follow up in office in 1 week. Message sent to urology .     I discussed the patient's findings and my recommendations with patient    (Please note that portions of this note were completed with a voice recognition program.)    Moiz Gallegos MD  10/11/24  09:45 CDT    Time: Time spent: 20 minutes spent performing evaluation and management, chart review, and discussion with patient, > 50% of time spent in face-to-face encounter

## 2024-10-11 NOTE — PLAN OF CARE
Goal Outcome Evaluation:  Plan of Care Reviewed With: patient        Progress: no change  Outcome Evaluation: Pt agreed to EOB and performed bed mobility with ModA. Pt needed encouraged to complete as I as possible. Pt c/o pain R arm, looked as if IV infiltrated. NSG notified during OT tx. Pt c/o RUE pain and edema noted. Pt sat EOB x15 mins then request BTB. Pt declined ADLs, ther ex or standing attempts. Assisted in sidelying Viral for comfort. Recommend contiued OT POC, pt still plans home s/c with daughter and HH. Would still recommend SNF      Anticipated Discharge Disposition (OT): skilled nursing facility

## 2024-10-11 NOTE — PLAN OF CARE
Goal Outcome Evaluation:  Plan of Care Reviewed With: patient        Progress: improving  Outcome Evaluation: see note                    Treatment Assessment (SLP): continued (10/11/24 1211)  Treatment Assessment Comments (SLP): see note (10/11/24 1211)  Plan for Continued Treatment (SLP): treatment no longer indicated as all goals met (10/11/24 1211)

## 2024-10-11 NOTE — THERAPY TREATMENT NOTE
Acute Care - Physical Therapy Treatment Note  [facility]     Patient Name: Jennifer Gomes  : 1940  MRN: [id number]  Today's Date: 10/11/2024      Visit Dx:     ICD-10-CM ICD-9-CM   1. Sepsis without acute organ dysfunction, due to unspecified organism  A41.9 038.9     995.91   2. Acute cystitis without hematuria  N30.00 595.0   3. Gastroenteritis  K52.9 558.9   4. Acute metabolic encephalopathy  G93.41 348.31   5. Acute respiratory failure with hypoxia  J96.01 518.81   6. Dysphagia, unspecified type [R13.10]  R13.10 787.20   7. Impaired mobility [Z74.09]  Z74.09 799.89     Patient Active Problem List   Diagnosis    Gastroesophageal reflux disease    Spinal stenosis, lumbar region, without neurogenic claudication    Erosion of vaginal mesh    Adenocarcinoma of endometrium    S/P hysterectomy with oophorectomy    Encounter for follow-up surveillance of endometrial cancer    History of radiation therapy    Non-traumatic rhabdomyolysis    Acute renal failure superimposed on stage 3 chronic kidney disease    Medical non-compliance, does not take narcotics as prescribed    Chronic constipation    Chronic pain syndrome    Chronic prescription opiate use    Anemia    Hypothyroidism (acquired)    Essential hypertension    Venous insufficiency of both lower extremities    Chronic intractable headache    RAFAL (obstructive sleep apnea)    Acute encephalopathy due to UTI    Toxic metabolic encephalopathy    Polypharmacy    Frequent falls    Grade III internal hemorrhoids    External hemorrhoids    Colitis    Moderate malnutrition    Transaminitis    Acute UTI (urinary tract infection)    Polypharmacy    UTI (urinary tract infection)    Dementia    Hypokalemia    Sepsis due to urinary tract infection    Cardiopulmonary arrest    E coli bacteremia    Anemia requiring transfusions    Bilateral hydronephrosis     Past Medical History:   Diagnosis Date    Anxiety     Arthritis     Bronchitis     Cancer     uterine    Chronic  pain     Depression     Disease of thyroid gland     Fibromyalgia     GERD (gastroesophageal reflux disease)     Headache     History of transfusion     AS     Hyperlipidemia     Hypertension     Incontinence     Insomnia     Leg pain     Lumbar stenosis     Migraines     Peptic ulcer     Restless legs     Sleep apnea     NO C-PAP    UTI (urinary tract infection)     Vaginal bleeding      Past Surgical History:   Procedure Laterality Date    BLADDER REPAIR      MESH HAD TO BE REMOVED IN 2013    BREAST BIOPSY Right 2017    benign    BREAST CYST EXCISION Left 1982    CARDIAC CATHETERIZATION      CARPAL TUNNEL RELEASE      CATARACT EXTRACTION W/ INTRAOCULAR LENS  IMPLANT, BILATERAL      CHOLECYSTECTOMY WITH INTRAOPERATIVE CHOLANGIOGRAM N/A 2023    Procedure: CHOLECYSTECTOMY LAPAROSCOPIC INTRAOPERATIVE CHOLANGIOGRAM;  Surgeon: Gerardo Arciniega MD;  Location: [facility] OR;  Service: General;  Laterality: N/A;    COLONOSCOPY      COLONOSCOPY N/A 10/01/2021    Procedure: COLONOSCOPY WITH ANESTHESIA;  Surgeon: Tom Velasco DO;  Location: [facility] ENDOSCOPY;  Service: Gastroenterology;  Laterality: N/A;  pre: change in bowel habits  post: diverticulosis. hemorrhoids.   Olivia Mora APRN        CYSTECTOMY      D & C HYSTEROSCOPY N/A 2017    Procedure: DILATATION AND CURETTAGE HYSTEROSCOPY;  Surgeon: Shasta Madrigal MD;  Location: [facility] OR;  Service:     DILATION AND CURETTAGE, DIAGNOSTIC / THERAPEUTIC      ENDOSCOPY  2010    Short segment of Arriola's,Moderate chroninc esophagogastritis and negative H.pylori    ENDOSCOPY N/A 2017    Procedure: ESOPHAGOGASTRODUODENOSCOPY WITH ANESTHESIA;  Surgeon: Tom Velasco DO;  Location: [facility] ENDOSCOPY;  Service:     EYE SURGERY      RETINA    HEMORRHOIDECTOMY SIGMOIDOSCOPY N/A 3/21/2023    Procedure: HEMORRHOIDECTOMY WITH EXAM UNDER ANESTHESIA;  Surgeon: Holly Chavez MD;  Location: [facility] OR;  Service: General;  Laterality:  N/A;    HYSTERECTOMY  12/20/2017    ORIF TIBIA/FIBULA FRACTURES Left 2000    TRANSVAGINAL TAPING SUSPENSION N/A 11/06/2017    Procedure: VAGINAL MESH REVISION;  Surgeon: Shasta Madrigal MD;  Location:  [facility] OR;  Service:     VAGINAL MESH REVISION  2013     PT Assessment (Last 12 Hours)       PT Evaluation and Treatment       Row Name 10/11/24 1034          Physical Therapy Time and Intention    Subjective Information no complaints  -KJ     Document Type therapy note (daily note)  -KJ     Mode of Treatment physical therapy  -KJ     Patient Effort adequate  -KJ       Row Name 10/11/24 1034          General Information    Existing Precautions/Restrictions fall  -KJ       Row Name 10/11/24 1034          Pain    Pretreatment Pain Rating 5/10  -KJ     Posttreatment Pain Rating 5/10  -KJ     Pain Location - Side/Orientation Bilateral  -KJ     Pain Location - knee  -KJ       Row Name 10/11/24 1034          Bed Mobility    Supine-Sit [place] (Bed Mobility) verbal cues;contact guard;minimum assist (75% patient effort);moderate assist (50% patient effort)  -KJ     Sit-Supine [place] (Bed Mobility) moderate assist (50% patient effort)  -KJ       Row Name 10/11/24 1034          Sit-Stand Transfer    Sit-Stand [place] (Transfers) verbal cues;minimum assist (75% patient effort)  -KJ     Assistive Device (Sit-Stand Transfers) walker, 4-wheeled  -KJ       Row Name 10/11/24 1034          Stand-Sit Transfer    Stand-Sit [place] (Transfers) verbal cues;contact guard  -KJ     Assistive Device (Stand-Sit Transfers) walker, 4-wheeled  -KJ       Row Name 10/11/24 1034          Gait/Stairs (Locomotion)    Comment, (Gait/Stairs) partial stand, she likes to lean over on her walker and walk herself up the walker  -KJ       Row Name 10/11/24 1034          Motor Skills    Therapeutic Exercise aerobic  -KJ       Row Name 10/11/24 1034          Aerobic Exercise    Comment, Aerobic Exercise (Therapeutic Exercise) AROM sitting  edge of bed  -KJ       Row Name 10/11/24 1034          Positioning and Restraints    Pre-Treatment Position in bed  -KJ     Post Treatment Position bed  -KJ     In Bed side lying right;call light within reach  -KJ               User Key  (r) = Recorded By, (t) = Taken By, (c) = Cosigned By      Initials Name Provider Type    Yasemin Amezcua, PTA Physical Therapist Assistant                    Physical Therapy Education       Title: PT OT SLP Therapies (In Progress)       Topic: Physical Therapy (In Progress)       Point: Mobility training (In Progress)       Learning Progress Summary             Patient Acceptance, E, NR by  at 10/7/2024 1158    Comment: role of PT, call for assist, d/c planning, high fall risk                         Point: Home exercise program (In Progress)       Learning Progress Summary             Patient Acceptance, E, NR by  at 10/7/2024 1158    Comment: role of PT, call for assist, d/c planning, high fall risk                         Point: Body mechanics (In Progress)       Learning Progress Summary             Patient Acceptance, E, NR by  at 10/7/2024 1158    Comment: role of PT, call for assist, d/c planning, high fall risk                         Point: Precautions (In Progress)       Learning Progress Summary             Patient Acceptance, E, NR by  at 10/7/2024 1158    Comment: role of PT, call for assist, d/c planning, high fall risk                                         User Key       Initials Effective Dates Name Provider Type [facility] 08/15/24 -  Sunny Esquivel, PT DPT Physical Therapist PT                  PT Recommendation and Plan     Plan of Care Reviewed With: patient  Progress: improving  Outcome Evaluation: PT tx completed. Pt c/o B knee pn. Agreeable to therapy. Ghazal sup>sit, sit EOB several minutes S. AROM BLE in sitting. Performed partial sit<>stand using rollator. She would only attempt x 1. Recommend SNF       Time Calculation:    PT Charges        Row Name 10/11/24 1101             Time Calculation    Start Time 1035  -KJ      Stop Time 1059  -KJ      Time Calculation (min) 24 min  -KJ      PT Received On 10/11/24  -KJ      PT Goal Re-Cert Due Date 10/17/24  -KJ         Time Calculation- PT    Total Timed Code Minutes- PT 24 minute(s)  -KJ                User Key  (r) = Recorded By, (t) = Taken By, (c) = Cosigned By      Initials Name Provider Type    Yasemin Amezcua, CHANDLER Physical Therapist Assistant                  Therapy Charges for Today       Code Description Service Date Service Provider Modifiers Qty    31215697061 HC PT THER PROC EA 15 MIN 10/10/2024 Yasemin Puri, PTA GP 1    96053805829 HC PT THERAPEUTIC ACT EA 15 MIN 10/10/2024 Yasemin Puri, PTA GP 1    75131543747 HC PT THER PROC EA 15 MIN 10/11/2024 Yasemin Puri, PTA GP 1    96340364713 HC PT THERAPEUTIC ACT EA 15 MIN 10/11/2024 Yasemin Puri, PTA GP 1            PT G-Codes  Outcome Measure Options: AM-PAC 6 Clicks Basic Mobility (PT)  AM-PAC 6 Clicks Score (PT): 9  AM-PAC 6 Clicks Score (OT): 12    Yasemin Puri PTA  10/11/2024

## 2024-10-11 NOTE — THERAPY TREATMENT NOTE
Patient Name: Jennifer Gomes  : 1940    MRN: [id number]                              Today's Date: 10/11/2024       Admit Date: 10/5/2024    Visit Dx: Therapist utilized gait belt, applied non-slipped socks, provided fall risk education/prevention, & facilitated muscle strengthening PRN to reduce patient falls risk during this session.      ICD-10-CM ICD-9-CM   1. Sepsis without acute organ dysfunction, due to unspecified organism  A41.9 038.9     995.91   2. Acute cystitis without hematuria  N30.00 595.0   3. Gastroenteritis  K52.9 558.9   4. Acute metabolic encephalopathy  G93.41 348.31   5. Acute respiratory failure with hypoxia  J96.01 518.81   6. Dysphagia, unspecified type [R13.10]  R13.10 787.20   7. Impaired mobility [Z74.09]  Z74.09 799.89     Patient Active Problem List   Diagnosis    Gastroesophageal reflux disease    Spinal stenosis, lumbar region, without neurogenic claudication    Erosion of vaginal mesh    Adenocarcinoma of endometrium    S/P hysterectomy with oophorectomy    Encounter for follow-up surveillance of endometrial cancer    History of radiation therapy    Non-traumatic rhabdomyolysis    Acute renal failure superimposed on stage 3 chronic kidney disease    Medical non-compliance, does not take narcotics as prescribed    Chronic constipation    Chronic pain syndrome    Chronic prescription opiate use    Anemia    Hypothyroidism (acquired)    Essential hypertension    Venous insufficiency of both lower extremities    Chronic intractable headache    RAFAL (obstructive sleep apnea)    Acute encephalopathy due to UTI    Toxic metabolic encephalopathy    Polypharmacy    Frequent falls    Grade III internal hemorrhoids    External hemorrhoids    Colitis    Moderate malnutrition    Transaminitis    Acute UTI (urinary tract infection)    Polypharmacy    UTI (urinary tract infection)    Dementia    Hypokalemia    Sepsis due to urinary tract infection    Cardiopulmonary arrest    E coli  bacteremia    Anemia requiring transfusions    Bilateral hydronephrosis     Past Medical History:   Diagnosis Date    Anxiety     Arthritis     Bronchitis     Cancer     uterine    Chronic pain     Depression     Disease of thyroid gland     Fibromyalgia     GERD (gastroesophageal reflux disease)     Headache     History of transfusion     AS     Hyperlipidemia     Hypertension     Incontinence     Insomnia     Leg pain     Lumbar stenosis     Migraines     Peptic ulcer     Restless legs     Sleep apnea     NO C-PAP    UTI (urinary tract infection)     Vaginal bleeding      Past Surgical History:   Procedure Laterality Date    BLADDER REPAIR      MESH HAD TO BE REMOVED IN 2013    BREAST BIOPSY Right 2017    benign    BREAST CYST EXCISION Left 1982    CARDIAC CATHETERIZATION      CARPAL TUNNEL RELEASE      CATARACT EXTRACTION W/ INTRAOCULAR LENS  IMPLANT, BILATERAL      CHOLECYSTECTOMY WITH INTRAOPERATIVE CHOLANGIOGRAM N/A 2023    Procedure: CHOLECYSTECTOMY LAPAROSCOPIC INTRAOPERATIVE CHOLANGIOGRAM;  Surgeon: Gerardo Arciniega MD;  Location: [facility] OR;  Service: General;  Laterality: N/A;    COLONOSCOPY      COLONOSCOPY N/A 10/01/2021    Procedure: COLONOSCOPY WITH ANESTHESIA;  Surgeon: Tom Velasco DO;  Location: [facility] ENDOSCOPY;  Service: Gastroenterology;  Laterality: N/A;  pre: change in bowel habits  post: diverticulosis. hemorrhoids.   Olivia Mora APRN        CYSTECTOMY      D & C HYSTEROSCOPY N/A 2017    Procedure: DILATATION AND CURETTAGE HYSTEROSCOPY;  Surgeon: Shasta Madrigal MD;  Location: [facility] OR;  Service:     DILATION AND CURETTAGE, DIAGNOSTIC / THERAPEUTIC      ENDOSCOPY  2010    Short segment of Arriola's,Moderate chroninc esophagogastritis and negative H.pylori    ENDOSCOPY N/A 2017    Procedure: ESOPHAGOGASTRODUODENOSCOPY WITH ANESTHESIA;  Surgeon: Tom Velasco DO;  Location: [facility] ENDOSCOPY;  Service:     EYE SURGERY      RETINA     HEMORRHOIDECTOMY SIGMOIDOSCOPY N/A 3/21/2023    Procedure: HEMORRHOIDECTOMY WITH EXAM UNDER ANESTHESIA;  Surgeon: Holly Chavez MD;  Location:  [facility] OR;  Service: General;  Laterality: N/A;    HYSTERECTOMY  12/20/2017    ORIF TIBIA/FIBULA FRACTURES Left 2000    TRANSVAGINAL TAPING SUSPENSION N/A 11/06/2017    Procedure: VAGINAL MESH REVISION;  Surgeon: Shasta Madrigal MD;  Location:  [facility] OR;  Service:     VAGINAL MESH REVISION  2013      General Information       Row Name 10/11/24 0830          OT Time and Intention    Document Type therapy note (daily note)  -MT     Mode of Treatment occupational therapy  -MT       Row Name 10/11/24 0830          General Information    Patient Profile Reviewed yes  -MT     Existing Precautions/Restrictions fall  -MT       Row Name 10/11/24 0830          Safety Issues, Functional Mobility    Cognitive Impairments, Mobility Safety/Performance attention;awareness, need for assistance;insight into deficits/self-awareness;problem-solving/reasoning;safety precaution awareness;safety precaution follow-through;sequencing abilities  -MT               User Key  (r) = Recorded By, (t) = Taken By, (c) = Cosigned By      Initials Name Provider Type    MT Sandra Jolley COTA Occupational Therapist Assistant                     Mobility/ADL's       Row Name 10/11/24 0830          Bed Mobility    Supine-Sit [place] (Bed Mobility) verbal cues;moderate assist (50% patient effort)  -MT     Sit-Supine [place] (Bed Mobility) minimum assist (75% patient effort)  -MT     Comment, (Bed Mobility) Pt agreed to EOB and performed bed mobility with ModA. Pt needed encouraged to complete as I as possible. Pt c/o pain R arm, looked as if IV infiltrated. NSG notified during OT tx. Pt c/o RUE pain and edema noted. Pt sat EOB x15 mins then request BTB. Pt declined ADLs, ther ex or standing attempts.  -MT               User Key  (r) = Recorded By, (t) = Taken By, (c) = Cosigned By      Initials  Name Provider Type    Sadnra Potter COTA Occupational Therapist Assistant                   Obj/Interventions    No documentation.                  Goals/Plan    No documentation.                  Clinical Impression       Row Name 10/11/24 0830          Pain Assessment    Pretreatment Pain Rating 6/10  -MT     Posttreatment Pain Rating 6/10  -MT     Pain Location - Side/Orientation Right  -MT     Pain Location upper  -MT     Pain Location - extremity  -MT     Pre/Posttreatment Pain Comment IV location  -MT       Row Name 10/11/24 0830          Therapy Plan Review/Discharge Plan (OT)    Anticipated Discharge Disposition (OT) skilled nursing facility  -MT       Row Name 10/11/24 0830          Positioning and Restraints    Pre-Treatment Position in bed  -MT     Post Treatment Position bed  -MT     In Bed side lying right;call light within reach;encouraged to call for assist;side rails up x2;pillow between legs  -MT               User Key  (r) = Recorded By, (t) = Taken By, (c) = Cosigned By      Initials Name Provider Type    Sandra Potter COTA Occupational Therapist Assistant                   Outcome Measures       Row Name 10/11/24 0830          How much help from another is currently needed...    Putting on and taking off regular lower body clothing? 1  -MT     Bathing (including washing, rinsing, and drying) 2  -MT     Toileting (which includes using toilet bed pan or urinal) 1  -MT     Putting on and taking off regular upper body clothing 2  -MT     Taking care of personal grooming (such as brushing teeth) 3  -MT     Eating meals 3  -MT     AM-PAC 6 Clicks Score (OT) 12  -MT               User Key  (r) = Recorded By, (t) = Taken By, (c) = Cosigned By      Initials Name Provider Type    Sandra Potter COTA Occupational Therapist Assistant                    Occupational Therapy Education       Title: PT OT SLP Therapies (In Progress)       Topic: Occupational Therapy (In Progress)       Point:  ADL training (Done)       Description:   Instruct learner(s) on proper safety adaptation and remediation techniques during self care or transfers.   Instruct in proper use of assistive devices.                  Learning Progress Summary             Patient Acceptance, E, VU by MT at 10/9/2024 1322    Comment: need for OT and OOB/in activity for increased I and self care    Acceptance, E, VU,NR by  at 10/7/2024 1105                         Point: Home exercise program (Not Started)       Description:   Instruct learner(s) on appropriate technique for monitoring, assisting and/or progressing therapeutic exercises/activities.                  Learner Progress:  Not documented in this visit.              Point: Precautions (Done)       Description:   Instruct learner(s) on prescribed precautions during self-care and functional transfers.                  Learning Progress Summary             Patient Acceptance, E, VU,NR by  at 10/7/2024 1105                         Point: Body mechanics (Done)       Description:   Instruct learner(s) on proper positioning and spine alignment during self-care, functional mobility activities and/or exercises.                  Learning Progress Summary             Patient Acceptance, E, VU,NR by  at 10/7/2024 1105                                         User Key       Initials Effective Dates Name Provider Type Discipline    MT 06/16/21 -  Sandra Jolley COTA Occupational Therapist Assistant OT     06/20/22 -  Faith Purvis OTR/L Occupational Therapist OT                  OT Recommendation and Plan     Plan of Care Review  Plan of Care Reviewed With: patient  Progress: no change  Outcome Evaluation: Pt agreed to EOB and performed bed mobility with ModA. Pt needed encouraged to complete as I as possible. Pt c/o pain R arm, looked as if IV infiltrated. NSG notified during OT tx. Pt c/o RUE pain and edema noted. Pt sat EOB x15 mins then request BTB. Pt declined ADLs, ther ex or  standing attempts. Assisted in sidelying Viral for comfort. Recommend contiued OT POC, pt still plans home s/c with daughter and HH. Would still recommend SNF     Time Calculation:         Time Calculation- OT       Row Name 10/11/24 0830             Time Calculation- OT    OT Start Time 0830  -MT      OT Stop Time 0854  -MT      OT Time Calculation (min) 24 min  -MT      Total Timed Code Minutes- OT 24 minute(s)  -MT      OT Received On 10/11/24  -MT         Timed Charges    65775 - OT Therapeutic Activity Minutes 24  -MT         Total Minutes    Timed Charges Total Minutes 24  -MT       Total Minutes 24  -MT                User Key  (r) = Recorded By, (t) = Taken By, (c) = Cosigned By      Initials Name Provider Type    MT Sandra Jolley COTA Occupational Therapist Assistant                  Therapy Charges for Today       Code Description Service Date Service Provider Modifiers Qty    43535990737 HC OT SELF CARE/MGMT/TRAIN EA 15 MIN 10/10/2024 Sandra Jolley COTA GO 3    68541616224 HC OT THERAPEUTIC ACT EA 15 MIN 10/11/2024 Sandra Jolley COTA GO 2                 ZAKIA Garibay  10/11/2024

## 2024-10-11 NOTE — PLAN OF CARE
Goal Outcome Evaluation:  Plan of Care Reviewed With: patient        Progress: no change  Outcome Evaluation: A/Ox3, forgetful to situation. Behavior is labile. PRN PO pain meds given as requested for chronic pain, relief noted. F/c remains in place, urine appears to be clear yellow. SCDs. Safety maintained.

## 2024-10-11 NOTE — PROGRESS NOTES
[facility] Medicine Services  INPATIENT PROGRESS NOTE    Patient Name: Jennifer Gomes  Date of Admission: 10/5/2024  Today's Date: 10/11/24  Length of Stay: 6  Primary Care Physician: Provider, No Known    Subjective   Chief Complaint: Generalized weakness  HPI     Smith was replaced yesterday by Dr. Gallegos.  The patient's renal function has improved since with creatinine 1.58 today.  Despite recommendations by physical therapy and Occupational Therapy, the patient's daughter continues to state that the patient will return home with her upon discharge.  Currently, the patient needs to establish with a new PCP as her previous PCP dismissed her from her practice.  Home health services apparently cannot be ordered prior to discharge without a PCP to follow those services.  Daughter has advised on different occasions that she is working on establishing the patient with a new PCP.  The hospitalist service may need to assume management of home health orders for short period of time in order to proceed with discharge.  I anticipate the patient will be ready for discharge this weekend.  BMP this a.m. shows potassium 3.4 and will be replaced.    Review of Systems   All pertinent negatives and positives are as above. All other systems have been reviewed and are negative unless otherwise stated.     Objective    Temp:  [97.6 °F (36.4 °C)-99 °F (37.2 °C)] 99 °F (37.2 °C)  Heart Rate:  [71-87] 74  Resp:  [16] 16  BP: (159-181)/(57-72) 180/57  Physical Exam    Constitutional:       Appearance: Normal appearance. She is normal weight.   HENT:      Head: Normocephalic and atraumatic.      Right Ear: External ear normal.      Left Ear: External ear normal.      Nose: Nose normal.      Mouth: Mucous membranes are moist.      Pharynx: Oropharynx is clear.   Eyes:      General: No scleral icterus.     Conjunctiva/sclera: Conjunctivae normal.   Cardiovascular:      Rate and Rhythm: Normal rate  and regular rhythm.      Pulses: Normal pulses.      Heart sounds: Normal heart sounds. No murmur heard.  Pulmonary:      Effort: Pulmonary effort is normal. No respiratory distress.      Breath sounds: Normal breath sounds.   Abdominal:      General: Abdomen is flat. Bowel sounds are normal.      Palpations: Abdomen is soft. There is no mass.      Tenderness: There is no abdominal tenderness.   Musculoskeletal:         General: Normal range of motion.   Skin:     General: Skin is warm and dry.   Neurological:      General: No focal deficit present.      Mental Status: She is alert and oriented to person, place, and time.   Psychiatric:         Mood and Affect: Mood normal.        Results Review:  I have reviewed the labs, radiology results, and diagnostic studies.    Laboratory Data:   Results from last 7 days   Lab Units 10/09/24  0439 10/08/24  0216 10/07/24  1234 10/07/24  0220   WBC 10*3/mm3 7.93 10.92*  --  12.52*   HEMOGLOBIN g/dL 7.6* 7.8* 7.4* 7.2*   HEMATOCRIT % 24.5* 26.3* 23.8* 23.2*   PLATELETS 10*3/mm3 155 125*  --  127*        Results from last 7 days   Lab Units 10/11/24  0523 10/09/24  0439 10/08/24  0216 10/05/24  0927 10/05/24  0551 10/05/24  0201   SODIUM mmol/L 139 138 137   < > 135* 133*   POTASSIUM mmol/L 3.4* 3.9 4.0   < > 4.0 4.5   CHLORIDE mmol/L 106 106 109*   < > 99 97*   CO2 mmol/L 25.0 21.0* 18.0*   < > 20.0* 22.0   BUN mg/dL 36* 44* 47*   < > 43* 47*   CREATININE mg/dL 1.58* 1.80* 2.18*   < > 2.13* 2.18*   CALCIUM mg/dL 9.2 9.1 8.7   < > 8.7 9.0   BILIRUBIN mg/dL  --   --   --   --  0.5 0.5   ALK PHOS U/L  --   --   --   --  83 70   ALT (SGPT) U/L  --   --   --   --  20 12   AST (SGOT) U/L  --   --   --   --  48* 19   GLUCOSE mg/dL 127* 124* 134*   < > 187* 165*    < > = values in this interval not displayed.       Culture Data:   Blood Culture   Date Value Ref Range Status   10/07/2024 No growth at 3 days  Preliminary   10/07/2024 No growth at 4 days  Preliminary   10/05/2024  Escherichia coli (C)  Final   10/05/2024 Escherichia coli (C)  Final     Urine Culture   Date Value Ref Range Status   10/05/2024 >100,000 CFU/mL Morganella morganii ssp morganii (A)  Final       Radiology Data:   Imaging Results (Last 24 Hours)       ** No results found for the last 24 hours. **            I have reviewed the patient's current medications.     Assessment/Plan   Assessment  Active Hospital Problems    Diagnosis     **Sepsis due to urinary tract infection     Cardiopulmonary arrest     E coli bacteremia     Anemia requiring transfusions     Bilateral hydronephrosis     Acute renal failure superimposed on stage 3 chronic kidney disease        Treatment Plan  KCl 20 mill equivalents 1 p.o. every 4 hours x 2 doses  Repeat BMP in a.m.  Probable discharge this weekend with home health and on oral antibiotics    Medical Decision Making  Medical Decision Making  Number and Complexity of problems:   6+ acute, high complexity problems     Differential Diagnosis: None     Conditions and Status        Condition is improving.     MDM Data  External documents reviewed: See HPI  Cardiac tracing (EKG, telemetry) interpretation: See HPI  Radiology interpretation: See HPI  Labs reviewed: See HPI  Any tests that were considered but not ordered: None     Decision rules/scores evaluated (example [id number], [place], etc): None     Discussed with: The patient     Care Planning  Shared decision making: The patient and her daughter  Code status and discussions: Full code     Disposition  Social Determinants of Health that impact treatment or disposition: None  I expect the patient to be discharged to home in 1-2 days.    Electronically signed by Lonnie Murphy DO, 10/11/24, 16:19 CDT.

## 2024-10-11 NOTE — PLAN OF CARE
Goal Outcome Evaluation:  Plan of Care Reviewed With: patient        Progress: no change  Outcome Evaluation: Pt A&Ox3. Up w/ PTGauri Smith. [facility] performed this shift due to refusal on night shift. Inc of stool. C/o of chronic R knee pain w/ PRN meds given. IV abx cont. Call light in reach. Safety maintained.

## 2024-10-11 NOTE — PLAN OF CARE
Goal Outcome Evaluation:  Plan of Care Reviewed With: patient        Progress: improving  Outcome Evaluation: PT tx completed. Pt c/o B knee pn. Agreeable to therapy. Ghazal sup>sit, sit EOB several minutes S. AROM BLE in sitting. Performed partial sit<>stand using rollator. She would only attempt x 1. Recommend SNF

## 2024-10-11 NOTE — CASE MANAGEMENT/SOCIAL WORK
Continued Stay Note   [place]     Patient Name: Jennifer Gomes  MRN: [id number]  Today's Date: 10/11/2024    Admit Date: 10/5/2024    Plan: Home   Discharge Plan       Row Name 10/11/24 1243       Plan    Plan Home    Plan Comments Daughter continues to state patient will return home upon discharge.  SW advised that they would need to establish with a new PCP and once seen and established by new provider they would need to order HH services.  HH services cannot be ordered prior to discharge as patient does not have a PCP to follow services.  Daughter advised she will work on establishing patient with a new PCP.                   Discharge Codes    No documentation.                 Expected Discharge Date and Time       Expected Discharge Date Expected Discharge Time    Oct 12, 2024               JEFRY Wood

## 2024-10-11 NOTE — THERAPY DISCHARGE NOTE
Acute Care - Speech Language Pathology   Swallow Treatment Note/Discharge  [facility]     Patient Name: Jennifer Gomes  : 1940  MRN: [id number]  Today's Date: 10/11/2024               Admit Date: 10/5/2024  Pt seen over lunch to assess safety and tolerance of current diet. Upon arrival, pt slightly reclined in bed consuming a sandwich. SLP offered to reposition pt upright but she politely declined. Pt consumed regular solid and thin liquid trials with no overt s/s of aspiration. Vocal quality remained clear. Pt denied further questions or concerns. Confirmed with RN Rubi and pt daughter that pt is at baseline for cognition. ST to sign off as skilled services are no longer warranted. Please reconsult if additional concerns arise. Thanks!    Wendy Sosa, MS CCC-SLP 10/11/2024 13:49 CDT      Visit Dx:    ICD-10-CM ICD-9-CM   1. Sepsis without acute organ dysfunction, due to unspecified organism  A41.9 038.9     995.91   2. Acute cystitis without hematuria  N30.00 595.0   3. Gastroenteritis  K52.9 558.9   4. Acute metabolic encephalopathy  G93.41 348.31   5. Acute respiratory failure with hypoxia  J96.01 518.81   6. Dysphagia, unspecified type [R13.10]  R13.10 787.20   7. Impaired mobility [Z74.09]  Z74.09 799.89     Patient Active Problem List   Diagnosis    Gastroesophageal reflux disease    Spinal stenosis, lumbar region, without neurogenic claudication    Erosion of vaginal mesh    Adenocarcinoma of endometrium    S/P hysterectomy with oophorectomy    Encounter for follow-up surveillance of endometrial cancer    History of radiation therapy    Non-traumatic rhabdomyolysis    Acute renal failure superimposed on stage 3 chronic kidney disease    Medical non-compliance, does not take narcotics as prescribed    Chronic constipation    Chronic pain syndrome    Chronic prescription opiate use    Anemia    Hypothyroidism (acquired)    Essential hypertension    Venous insufficiency of both lower extremities     Chronic intractable headache    RAFAL (obstructive sleep apnea)    Acute encephalopathy due to UTI    Toxic metabolic encephalopathy    Polypharmacy    Frequent falls    Grade III internal hemorrhoids    External hemorrhoids    Colitis    Moderate malnutrition    Transaminitis    Acute UTI (urinary tract infection)    Polypharmacy    UTI (urinary tract infection)    Dementia    Hypokalemia    Sepsis due to urinary tract infection    Cardiopulmonary arrest    E coli bacteremia    Anemia requiring transfusions    Bilateral hydronephrosis     Past Medical History:   Diagnosis Date    Anxiety     Arthritis     Bronchitis     Cancer     uterine    Chronic pain     Depression     Disease of thyroid gland     Fibromyalgia     GERD (gastroesophageal reflux disease)     Headache     History of transfusion     AS     Hyperlipidemia     Hypertension     Incontinence     Insomnia     Leg pain     Lumbar stenosis     Migraines     Peptic ulcer     Restless legs     Sleep apnea     NO C-PAP    UTI (urinary tract infection)     Vaginal bleeding      Past Surgical History:   Procedure Laterality Date    BLADDER REPAIR      MESH HAD TO BE REMOVED IN 2013    BREAST BIOPSY Right 2017    benign    BREAST CYST EXCISION Left     CARDIAC CATHETERIZATION      CARPAL TUNNEL RELEASE      CATARACT EXTRACTION W/ INTRAOCULAR LENS  IMPLANT, BILATERAL      CHOLECYSTECTOMY WITH INTRAOPERATIVE CHOLANGIOGRAM N/A 2023    Procedure: CHOLECYSTECTOMY LAPAROSCOPIC INTRAOPERATIVE CHOLANGIOGRAM;  Surgeon: Gerardo Arciniega MD;  Location: [facility] OR;  Service: General;  Laterality: N/A;    COLONOSCOPY      COLONOSCOPY N/A 10/01/2021    Procedure: COLONOSCOPY WITH ANESTHESIA;  Surgeon: Tom Velasco DO;  Location: [facility] ENDOSCOPY;  Service: Gastroenterology;  Laterality: N/A;  pre: change in bowel habits  post: diverticulosis. hemorrhoids.   Olivia Mora APRN        CYSTECTOMY      D & C HYSTEROSCOPY N/A 2017    Procedure:  DILATATION AND CURETTAGE HYSTEROSCOPY;  Surgeon: Shasta Madrigal MD;  Location:  [facility] OR;  Service:     DILATION AND CURETTAGE, DIAGNOSTIC / THERAPEUTIC  2008    ENDOSCOPY  09/23/2010    Short segment of Arriola's,Moderate chroninc esophagogastritis and negative H.pylori    ENDOSCOPY N/A 09/25/2017    Procedure: ESOPHAGOGASTRODUODENOSCOPY WITH ANESTHESIA;  Surgeon: Tom Velasco DO;  Location:  [facility] ENDOSCOPY;  Service:     EYE SURGERY      RETINA    HEMORRHOIDECTOMY SIGMOIDOSCOPY N/A 3/21/2023    Procedure: HEMORRHOIDECTOMY WITH EXAM UNDER ANESTHESIA;  Surgeon: Holly Chavez MD;  Location:  [facility] OR;  Service: General;  Laterality: N/A;    HYSTERECTOMY  12/20/2017    ORIF TIBIA/FIBULA FRACTURES Left 2000    TRANSVAGINAL TAPING SUSPENSION N/A 11/06/2017    Procedure: VAGINAL MESH REVISION;  Surgeon: Shasta Madrigal MD;  Location:  [facility] OR;  Service:     VAGINAL MESH REVISION  2013       SLP Recommendation and Plan                                   Daily Summary of Progress (SLP): progress toward functional goals is good (10/11/24 1211)                       Treatment Assessment (SLP): continued (10/11/24 1211)  Treatment Assessment Comments (SLP): see note (10/11/24 1211)  Plan for Continued Treatment (SLP): treatment no longer indicated as all goals met (10/11/24 1211)    Plan of Care Reviewed With: patient (10/11/24 1346)  Progress: improving (10/11/24 1346)  Outcome Evaluation: see note (10/11/24 1346)    SWALLOW EVALUATION (Last 72 Hours)       SLP Adult Swallow Evaluation       Row Name 10/11/24 1211 10/09/24 0757 10/08/24 1430             Rehab Evaluation    Document Type therapy note (daily note)  -JR therapy note (daily note)  -JR therapy note (daily note)  -MB      Subjective Information no complaints  -JR no complaints  -JR no complaints  -MB      Patient Observations alert;cooperative  -JR alert;cooperative  -JR alert;cooperative  -MB      Patient/Family/Caregiver Comments/Observations no  family present  -JR no family present  -JR No family present  -MB      Patient Effort adequate  -JR adequate  -JR adequate  -MB      Symptoms Noted During/After Treatment none  -JR none  -JR --         General Information    Patient Profile Reviewed yes  -JR yes  -JR --         Pain    Additional Documentation Pain Scale: FACES Pre/Post-Treatment (Group)  -JR Pain Scale: FACES Pre/Post-Treatment (Group)  -JR Pain Scale: FACES Pre/Post-Treatment (Group)  -MB         Pain Scale: FACES Pre/Post-Treatment    Pain: FACES Scale, Pretreatment 0-->no hurt  -JR 0-->no hurt  -JR 2-->hurts little bit  -MB      Posttreatment Pain Rating 0-->no hurt  -JR 0-->no hurt  -JR --      Pre/Posttreatment Pain Comment -- -- When SLP raised HOB  -MB         SLP Treatment Clinical Impressions    Treatment Assessment (SLP) continued  -JR continued  -JR improved  -MB      Treatment Assessment Comments (SLP) see note  -JR see note  -JR Advance to regular solids, continue thin liquids  -MB      Daily Summary of Progress (SLP) progress toward functional goals is good  -JR progress towards functional goals is fair  -JR progress towards functional goals is fair  -MB      Barriers to Overall Progress (SLP) -- Cognitive status  -JR Cognitive status  -MB      Plan for Continued Treatment (SLP) treatment no longer indicated as all goals met  -JR continue treatment per plan of care  -JR goals adjusted to reflect functional improvements demonstrated  -MB      Care Plan Review evaluation/treatment results reviewed;care plan/treatment goals reviewed;risks/benefits reviewed  -JR evaluation/treatment results reviewed  -JR --         Recommendations    SLP Diet Recommendation -- -- regular textures;thin liquids  -MB         Swallow Goals (SLP)    Swallow LTGs Swallow Long Term Goal (free text)  -JR Swallow Long Term Goal (free text)  -JR --      Swallow STGs diet tolerance goal selection (SLP)  -JR diet tolerance goal selection (SLP)  - --      Diet  Tolerance Goal Selection (SLP) Patient will tolerate trials of  -JR Patient will tolerate trials of  -JR --         (LTG) Swallow    (LTG) Swallow Pt will tolerate LRD with no overt s/s of aspiration.  -JR Pt will tolerate LRD with no overt s/s of aspiration.  -JR Pt will tolerate LRD with no overt s/s of aspiration.  -MB      [place] (Swallow Long Term Goal) independently (over 90% accuracy)  -JR independently (over 90% accuracy)  -JR independently (over 90% accuracy)  -MB      Time Frame (Swallow Long Term Goal) 1 week  -JR 1 week  -JR 1 week  -MB      Barriers (Swallow Long Term Goal) medically complex  -JR medically complex  -JR medically complex  -MB      Progress/Outcomes (Swallow Long Term Goal) goal met  -JR continuing progress toward goal  -JR continuing progress toward goal  -MB      Comment (Swallow Long Term Goal) -- see note  -JR see note  -MB         (STG) Patient will tolerate trials of    Consistencies Trialed (Tolerate trials) soft to chew (chopped) textures;soft to chew (whole) textures;regular textures;thin liquids  -JR soft to chew (chopped) textures;soft to chew (whole) textures;regular textures;thin liquids  -JR soft to chew (chopped) textures;soft to chew (whole) textures;regular textures;thin liquids  -MB      Desired Outcome (Tolerate trials) without signs/symptoms of aspiration  -JR without signs/symptoms of aspiration  -JR without signs/symptoms of aspiration  -MB      [place] (Tolerate trials) independently (over 90% accuracy)  -JR independently (over 90% accuracy)  -JR independently (over 90% accuracy)  -MB      Time Frame (Tolerate trials) by discharge  -JR by discharge  -JR by discharge  -MB      Progress/Outcomes (Tolerate trials) -- continuing progress toward goal  -JR continuing progress toward goal  -MB      Comment (Tolerate trials) -- see note  -JR --                User Key  (r) = Recorded By, (t) = Taken By, (c) = Cosigned By      Initials Name Effective Dates    MB  Serena Vasquez, CCC-SLP 02/03/23 -     JR Wendy Sosa, MS CCC-SLP 08/22/23 -                     EDUCATION  The patient has been educated in the following areas:   Dysphagia (Swallowing Impairment).         SLP GOALS       Row Name 10/11/24 1211 10/09/24 0757 10/08/24 1430       (LTG) Swallow    (LTG) Swallow Pt will tolerate LRD with no overt s/s of aspiration.  -JR Pt will tolerate LRD with no overt s/s of aspiration.  -JR Pt will tolerate LRD with no overt s/s of aspiration.  -MB    [place] (Swallow Long Term Goal) independently (over 90% accuracy)  -JR independently (over 90% accuracy)  -JR independently (over 90% accuracy)  -MB    Time Frame (Swallow Long Term Goal) 1 week  -JR 1 week  -JR 1 week  -MB    Barriers (Swallow Long Term Goal) medically complex  -JR medically complex  -JR medically complex  -MB    Progress/Outcomes (Swallow Long Term Goal) goal met  -JR continuing progress toward goal  -JR continuing progress toward goal  -MB    Comment (Swallow Long Term Goal) -- see note  -JR see note  -MB       (STG) Patient will tolerate trials of    Consistencies Trialed (Tolerate trials) soft to chew (chopped) textures;soft to chew (whole) textures;regular textures;thin liquids  -JR soft to chew (chopped) textures;soft to chew (whole) textures;regular textures;thin liquids  -JR soft to chew (chopped) textures;soft to chew (whole) textures;regular textures;thin liquids  -MB    Desired Outcome (Tolerate trials) without signs/symptoms of aspiration  -JR without signs/symptoms of aspiration  -JR without signs/symptoms of aspiration  -MB    [place] (Tolerate trials) independently (over 90% accuracy)  -JR independently (over 90% accuracy)  -JR independently (over 90% accuracy)  -MB    Time Frame (Tolerate trials) by discharge  -JR by discharge  -JR by discharge  -MB    Progress/Outcomes (Tolerate trials) -- continuing progress toward goal  -JR continuing progress toward goal  -MB    Comment (Tolerate  trials) -- see note  -JR --              User Key  (r) = Recorded By, (t) = Taken By, (c) = Cosigned By      Initials Name Provider Type    Serena Gan CCC-SLP Speech and Language Pathologist    Wendy Jones MS CCC-SLP Speech and Language Pathologist                           Time Calculation:    Time Calculation- SLP       Row Name 10/11/24 1349             Time Calculation- SLP    SLP Start Time 1211  -JR      SLP Stop Time 1234  -JR      SLP Time Calculation (min) 23 min  -JR      SLP Received On 10/11/24  -JR         Untimed Charges    37353-KP Treatment Swallow Minutes 23  -JR         Total Minutes    Untimed Charges Total Minutes 23  -JR       Total Minutes 23  -JR                User Key  (r) = Recorded By, (t) = Taken By, (c) = Cosigned By      Initials Name Provider Type    Wendy Jones MS CCC-SLP Speech and Language Pathologist                    Therapy Charges for Today       Code Description Service Date Service Provider Modifiers Qty    [id number]  ST TREATMENT SPEECH 2 10/11/2024 Wendy Sosa MS CCC-SLP GN 1                      Wendy Sosa MS CCC-SLP  10/11/2024

## 2024-10-12 PROBLEM — R33.8 ACUTE URINARY RETENTION: Status: ACTIVE | Noted: 2024-10-12

## 2024-10-12 LAB
ANION GAP SERPL CALCULATED.3IONS-SCNC: 11 MMOL/L (ref 5–15)
BACTERIA SPEC AEROBE CULT: NORMAL
BACTERIA SPEC AEROBE CULT: NORMAL
BUN SERPL-MCNC: 30 MG/DL (ref 8–23)
BUN/CREAT SERPL: 20.1 (ref 7–25)
CALCIUM SPEC-SCNC: 9.3 MG/DL (ref 8.6–10.5)
CHLORIDE SERPL-SCNC: 105 MMOL/L (ref 98–107)
CO2 SERPL-SCNC: 24 MMOL/L (ref 22–29)
CREAT SERPL-MCNC: 1.49 MG/DL (ref 0.57–1)
EGFRCR SERPLBLD CKD-EPI 2021: 34.9 ML/MIN/1.73
GLUCOSE SERPL-MCNC: 138 MG/DL (ref 65–99)
IRON 24H UR-MRATE: 62 MCG/DL (ref 37–145)
IRON SATN MFR SERPL: 24 % (ref 20–50)
POTASSIUM SERPL-SCNC: 3.7 MMOL/L (ref 3.5–5.2)
SODIUM SERPL-SCNC: 140 MMOL/L (ref 136–145)
TIBC SERPL-MCNC: 258 MCG/DL (ref 298–536)
TRANSFERRIN SERPL-MCNC: 173 MG/DL (ref 200–360)

## 2024-10-12 PROCEDURE — 25010000002 CEFTRIAXONE PER 250 MG: Performed by: INTERNAL MEDICINE

## 2024-10-12 PROCEDURE — 84466 ASSAY OF TRANSFERRIN: CPT | Performed by: INTERNAL MEDICINE

## 2024-10-12 PROCEDURE — 97530 THERAPEUTIC ACTIVITIES: CPT

## 2024-10-12 PROCEDURE — 83540 ASSAY OF IRON: CPT | Performed by: INTERNAL MEDICINE

## 2024-10-12 PROCEDURE — 80048 BASIC METABOLIC PNL TOTAL CA: CPT | Performed by: FAMILY MEDICINE

## 2024-10-12 PROCEDURE — 25010000002 HEPARIN (PORCINE) PER 1000 UNITS: Performed by: INTERNAL MEDICINE

## 2024-10-12 RX ORDER — POTASSIUM CHLORIDE 750 MG/1
20 CAPSULE, EXTENDED RELEASE ORAL ONCE
Status: COMPLETED | OUTPATIENT
Start: 2024-10-12 | End: 2024-10-12

## 2024-10-12 RX ORDER — AMLODIPINE BESYLATE 5 MG/1
5 TABLET ORAL
Status: DISCONTINUED | OUTPATIENT
Start: 2024-10-12 | End: 2024-10-15 | Stop reason: HOSPADM

## 2024-10-12 RX ORDER — CEFDINIR 300 MG/1
300 CAPSULE ORAL
Status: DISCONTINUED | OUTPATIENT
Start: 2024-10-13 | End: 2024-10-15 | Stop reason: HOSPADM

## 2024-10-12 RX ADMIN — PANTOPRAZOLE SODIUM 40 MG: 40 TABLET, DELAYED RELEASE ORAL at 06:04

## 2024-10-12 RX ADMIN — CARVEDILOL 12.5 MG: 6.25 TABLET, FILM COATED ORAL at 21:44

## 2024-10-12 RX ADMIN — POTASSIUM CHLORIDE 20 MEQ: 750 CAPSULE, EXTENDED RELEASE ORAL at 14:03

## 2024-10-12 RX ADMIN — HEPARIN SODIUM 5000 UNITS: 5000 INJECTION, SOLUTION INTRAVENOUS; SUBCUTANEOUS at 21:43

## 2024-10-12 RX ADMIN — DOCUSATE SODIUM 50 MG AND SENNOSIDES 8.6 MG 2 TABLET: 8.6; 5 TABLET, FILM COATED ORAL at 08:43

## 2024-10-12 RX ADMIN — ATORVASTATIN CALCIUM 40 MG: 40 TABLET, FILM COATED ORAL at 08:43

## 2024-10-12 RX ADMIN — SERTRALINE HYDROCHLORIDE 25 MG: 50 TABLET ORAL at 08:44

## 2024-10-12 RX ADMIN — OXYCODONE HYDROCHLORIDE 10 MG: 5 TABLET ORAL at 07:29

## 2024-10-12 RX ADMIN — CHOLECALCIFEROL TAB 10 MCG (400 UNIT) 800 UNITS: 10 TAB at 08:43

## 2024-10-12 RX ADMIN — POLYETHYLENE GLYCOL 3350 17 G: 17 POWDER, FOR SOLUTION ORAL at 08:44

## 2024-10-12 RX ADMIN — Medication 10 ML: at 08:45

## 2024-10-12 RX ADMIN — HEPARIN SODIUM 5000 UNITS: 5000 INJECTION, SOLUTION INTRAVENOUS; SUBCUTANEOUS at 06:03

## 2024-10-12 RX ADMIN — CEFTRIAXONE SODIUM 2000 MG: 2 INJECTION, POWDER, FOR SOLUTION INTRAMUSCULAR; INTRAVENOUS at 08:44

## 2024-10-12 RX ADMIN — DONEPEZIL HYDROCHLORIDE 10 MG: 10 TABLET, FILM COATED ORAL at 21:43

## 2024-10-12 RX ADMIN — LEVOTHYROXINE SODIUM 50 MCG: 100 TABLET ORAL at 06:03

## 2024-10-12 RX ADMIN — OXYCODONE HYDROCHLORIDE 10 MG: 5 TABLET ORAL at 21:43

## 2024-10-12 RX ADMIN — HEPARIN SODIUM 5000 UNITS: 5000 INJECTION, SOLUTION INTRAVENOUS; SUBCUTANEOUS at 14:03

## 2024-10-12 RX ADMIN — OXYCODONE HYDROCHLORIDE 10 MG: 5 TABLET ORAL at 11:36

## 2024-10-12 RX ADMIN — OXYCODONE HYDROCHLORIDE 10 MG: 5 TABLET ORAL at 15:37

## 2024-10-12 RX ADMIN — LACTULOSE 20 G: 20 SOLUTION ORAL at 08:44

## 2024-10-12 RX ADMIN — CARVEDILOL 12.5 MG: 6.25 TABLET, FILM COATED ORAL at 08:44

## 2024-10-12 NOTE — THERAPY TREATMENT NOTE
Acute Care - Physical Therapy Treatment Note  [facility]     Patient Name: Jennifer Gomes  : 1940  MRN: [id number]  Today's Date: 10/12/2024      Visit Dx:     ICD-10-CM ICD-9-CM   1. Sepsis without acute organ dysfunction, due to unspecified organism  A41.9 038.9     995.91   2. Acute cystitis without hematuria  N30.00 595.0   3. Gastroenteritis  K52.9 558.9   4. Acute metabolic encephalopathy  G93.41 348.31   5. Acute respiratory failure with hypoxia  J96.01 518.81   6. Dysphagia, unspecified type [R13.10]  R13.10 787.20   7. Impaired mobility [Z74.09]  Z74.09 799.89     Patient Active Problem List   Diagnosis    Gastroesophageal reflux disease    Spinal stenosis, lumbar region, without neurogenic claudication    Erosion of vaginal mesh    Adenocarcinoma of endometrium    S/P hysterectomy with oophorectomy    Encounter for follow-up surveillance of endometrial cancer    History of radiation therapy    Non-traumatic rhabdomyolysis    Acute renal failure superimposed on stage 3 chronic kidney disease    Medical non-compliance, does not take narcotics as prescribed    Chronic constipation    Chronic pain syndrome    Chronic prescription opiate use    Anemia    Hypothyroidism (acquired)    Essential hypertension    Venous insufficiency of both lower extremities    Chronic intractable headache    RAFAL (obstructive sleep apnea)    Acute encephalopathy due to UTI    Toxic metabolic encephalopathy    Polypharmacy    Frequent falls    Grade III internal hemorrhoids    External hemorrhoids    Colitis    Moderate malnutrition    Transaminitis    Acute UTI (urinary tract infection)    Polypharmacy    UTI (urinary tract infection)    Dementia    Hypokalemia    Sepsis due to urinary tract infection    Cardiopulmonary arrest    E coli bacteremia    Anemia requiring transfusions    Bilateral hydronephrosis    Acute urinary retention     Past Medical History:   Diagnosis Date    Anxiety     Arthritis     Bronchitis      Cancer     uterine    Chronic pain     Depression     Disease of thyroid gland     Fibromyalgia     GERD (gastroesophageal reflux disease)     Headache     History of transfusion     AS     Hyperlipidemia     Hypertension     Incontinence     Insomnia     Leg pain     Lumbar stenosis     Migraines     Peptic ulcer     Restless legs     Sleep apnea     NO C-PAP    UTI (urinary tract infection)     Vaginal bleeding      Past Surgical History:   Procedure Laterality Date    BLADDER REPAIR      MESH HAD TO BE REMOVED IN 2013    BREAST BIOPSY Right 2017    benign    BREAST CYST EXCISION Left 1982    CARDIAC CATHETERIZATION      CARPAL TUNNEL RELEASE      CATARACT EXTRACTION W/ INTRAOCULAR LENS  IMPLANT, BILATERAL      CHOLECYSTECTOMY WITH INTRAOPERATIVE CHOLANGIOGRAM N/A 2023    Procedure: CHOLECYSTECTOMY LAPAROSCOPIC INTRAOPERATIVE CHOLANGIOGRAM;  Surgeon: Gerardo Arciniega MD;  Location: [facility] OR;  Service: General;  Laterality: N/A;    COLONOSCOPY      COLONOSCOPY N/A 10/01/2021    Procedure: COLONOSCOPY WITH ANESTHESIA;  Surgeon: Tom Velasco DO;  Location: [facility] ENDOSCOPY;  Service: Gastroenterology;  Laterality: N/A;  pre: change in bowel habits  post: diverticulosis. hemorrhoids.   Olivia Mora APRN        CYSTECTOMY      D & C HYSTEROSCOPY N/A 2017    Procedure: DILATATION AND CURETTAGE HYSTEROSCOPY;  Surgeon: Shasta Madrigal MD;  Location: [facility] OR;  Service:     DILATION AND CURETTAGE, DIAGNOSTIC / THERAPEUTIC      ENDOSCOPY  2010    Short segment of Arriola's,Moderate chroninc esophagogastritis and negative H.pylori    ENDOSCOPY N/A 2017    Procedure: ESOPHAGOGASTRODUODENOSCOPY WITH ANESTHESIA;  Surgeon: Tom Velasco DO;  Location: [facility] ENDOSCOPY;  Service:     EYE SURGERY      RETINA    HEMORRHOIDECTOMY SIGMOIDOSCOPY N/A 3/21/2023    Procedure: HEMORRHOIDECTOMY WITH EXAM UNDER ANESTHESIA;  Surgeon: Holly Chavez MD;  Location: [facility] OR;   Service: General;  Laterality: N/A;    HYSTERECTOMY  12/20/2017    ORIF TIBIA/FIBULA FRACTURES Left 2000    TRANSVAGINAL TAPING SUSPENSION N/A 11/06/2017    Procedure: VAGINAL MESH REVISION;  Surgeon: Shasta Madrigal MD;  Location: [facility] OR;  Service:     VAGINAL MESH REVISION  2013     PT Assessment (Last 12 Hours)       PT Evaluation and Treatment       Row Name 10/12/24 1128          Physical Therapy Time and Intention    Subjective Information complains of;weakness;fatigue;pain  -LY     Document Type therapy note (daily note)  -LY     Mode of Treatment physical therapy  -LY       Row Name 10/12/24 1128          General Information    Existing Precautions/Restrictions fall  -LY       Row Name 10/12/24 1128          Pain    Pretreatment Pain Rating 8/10  -LY     Posttreatment Pain Rating 8/10  -LY     Pain Side/Orientation generalized  -LY     Pain Intervention(s) Medication (See MAR);Repositioned  -LY       Row Name 10/12/24 1128          Bed Mobility    Bed Mobility rolling left;rolling right  -LY     Rolling Left [place] (Bed Mobility) verbal cues;moderate assist (50% patient effort)  -LY     Rolling Right [place] (Bed Mobility) verbal cues;moderate assist (50% patient effort)  -LY     Assistive Device (Bed Mobility) bed rails  -LY     Comment, (Bed Mobility) rolled several times for hygiene care and linen change  -LY       Row Name 10/12/24 1128          Transfers    Comment, (Transfers) deferred d/t fatigue post bed mobility  -LY       Row Name 10/12/24 1128          Plan of Care Review    Plan of Care Reviewed With patient  -LY     Progress no change  -LY     Outcome Evaluation PT tx completed. Pt in bed and agreeable to therapy on arrival. Once PTA pulled back her blankets pt found to be visible soiled. Pt reports she was unaware she had an BM. Pt required full bed change. Rolled L and R with mod x1 several times for hygiene care, nsg assisted. Pt then too fatigued to perform further activity.  Will cont to follow.  -LY       Row Name 10/12/24 1128          Positioning and Restraints    Pre-Treatment Position in bed  -LY     Post Treatment Position bed  -LY     In Bed fowlers;call light within reach;encouraged to call for assist;with nsg  -LY               User Key  (r) = Recorded By, (t) = Taken By, (c) = Cosigned By      Initials Name Provider Type    LY Faith Thompson, PTA Physical Therapist Assistant                    Physical Therapy Education       Title: PT OT SLP Therapies (In Progress)       Topic: Physical Therapy (In Progress)       Point: Mobility training (In Progress)       Learning Progress Summary            Patient Acceptance, E, NR by  at 10/7/2024 1158    Comment: role of PT, call for assist, d/c planning, high fall risk                      Point: Home exercise program (In Progress)       Learning Progress Summary            Patient Acceptance, E, NR by  at 10/7/2024 1158    Comment: role of PT, call for assist, d/c planning, high fall risk                      Point: Body mechanics (In Progress)       Learning Progress Summary            Patient Acceptance, E, NR by  at 10/7/2024 1158    Comment: role of PT, call for assist, d/c planning, high fall risk                      Point: Precautions (In Progress)       Learning Progress Summary            Patient Acceptance, E, NR by  at 10/7/2024 1158    Comment: role of PT, call for assist, d/c planning, high fall risk                                      User Key       Initials Effective Dates Name Provider Type Discipline     08/15/24 -  Sunny Esquivel, HUNTER DPT Physical Therapist PT                  PT Recommendation and Plan     Plan of Care Reviewed With: patient  Progress: no change  Outcome Evaluation: PT tx completed. Pt in bed and agreeable to therapy on arrival. Once PTA pulled back her blankets pt found to be visible soiled. Pt reports she was unaware she had an BM. Pt required full bed change. Rolled L and R with mod x1  several times for hygiene care, nsg assisted. Pt then too fatigued to perform further activity. Will cont to follow.       Time Calculation:    PT Charges       Row Name 10/12/24 1428             Time Calculation    Start Time 1128  -LY      Stop Time 1158  -LY      Time Calculation (min) 30 min  -LY      PT Received On 10/12/24  -LY         Time Calculation- PT    Total Timed Code Minutes- PT 30 minute(s)  -LY         Timed Charges    01455 - PT Therapeutic Activity Minutes 30  -LY         Total Minutes    Timed Charges Total Minutes 30  -LY       Total Minutes 30  -LY                User Key  (r) = Recorded By, (t) = Taken By, (c) = Cosigned By      Initials Name Provider Type    LY Faith Thompson PTA Physical Therapist Assistant                  Therapy Charges for Today       Code Description Service Date Service Provider Modifiers Qty    [id number] HC PT THERAPEUTIC ACT EA 15 MIN 10/12/2024 Faith Thompson PTA GP 2            PT G-Codes  Outcome Measure Options: AM-PAC 6 Clicks Basic Mobility (PT)  AM-PAC 6 Clicks Score (PT): 9  AM-PAC 6 Clicks Score (OT): 12    Faith Thompson PTA  10/12/2024

## 2024-10-12 NOTE — PROGRESS NOTES
"    [facility] Medicine Services  INPATIENT PROGRESS NOTE    Patient Name: Jennifer Gomes  Date of Admission: 10/5/2024  Today's Date: 10/12/24  Length of Stay: 7  Primary Care Physician: Provider, No Known    Subjective   Chief Complaint: Weakness  HPI   Patient states that she is doing okay this morning.  When I asked her if she is having any pain she indicated that she hurts \"all over.\"  She indicated that that was not uncommon.  She indicated that her plan was to go home with her daughter and when I asked her about the state of her strengthening, conditioning, stamina, etc., and we specifically discussed activities of daily living she indicated it would be \"easy peasy\" for her to be able to do this at home with the help of her daughter.  She does report having a good bowel movement recently after an enema.  Smith catheter is in place.  We discussed a plan to transition off of IV antibiotics to oral today.  Plan to DC cardiac telemetry (no events).  We discussed a \"test drive\" in anticipation of a plan for discharge home soon and ensure no surprises.    Review of Systems   All pertinent negatives and positives are as above. All other systems have been reviewed and are negative unless otherwise stated.     Objective    Temp:  [98.1 °F (36.7 °C)-99.4 °F (37.4 °C)] 98.3 °F (36.8 °C)  Heart Rate:  [51-86] 80  Resp:  [14-18] 18  BP: (141-180)/(55-74) 164/74  Physical Exam  Vitals reviewed.   Constitutional:       General: She is not in acute distress.  HENT:      Head: Normocephalic.      Mouth/Throat:      Mouth: Mucous membranes are moist.   Pulmonary:      Effort: Pulmonary effort is normal. No respiratory distress.      Comments: On room air  Genitourinary:     Comments: Smith in place; normal appearing urine  Musculoskeletal:      Comments: SCDs in place   Skin:     General: Skin is warm.   Neurological:      General: No focal deficit present.      Mental Status: She is alert.     "  Motor: Weakness present.   Psychiatric:         Mood and Affect: Mood normal.         Results Review:  I have reviewed the labs, radiology results, and diagnostic studies.    Laboratory Data:   Results from last 7 days   Lab Units 10/09/24  0439 10/08/24  0216 10/07/24  1234 10/07/24  0220   WBC 10*3/mm3 7.93 10.92*  --  12.52*   HEMOGLOBIN g/dL 7.6* 7.8* 7.4* 7.2*   HEMATOCRIT % 24.5* 26.3* 23.8* 23.2*   PLATELETS 10*3/mm3 155 125*  --  127*        Results from last 7 days   Lab Units 10/12/24  0244 10/11/24  0523 10/09/24  0439   SODIUM mmol/L 140 139 138   POTASSIUM mmol/L 3.7 3.4* 3.9   CHLORIDE mmol/L 105 106 106   CO2 mmol/L 24.0 25.0 21.0*   BUN mg/dL 30* 36* 44*   CREATININE mg/dL 1.49* 1.58* 1.80*   CALCIUM mg/dL 9.3 9.2 9.1   GLUCOSE mg/dL 138* 127* 124*       Culture Data:   Blood Culture   Date Value Ref Range Status   10/07/2024 No growth at 4 days  Preliminary   10/07/2024 No growth at 4 days  Preliminary       Radiology Data:   Imaging Results (Last 24 Hours)       ** No results found for the last 24 hours. **            I have reviewed the patient's current medications.     Assessment/Plan   Assessment  Active Hospital Problems    Diagnosis     **Sepsis due to urinary tract infection     Acute urinary retention     Cardiopulmonary arrest     E coli bacteremia     Anemia requiring transfusions     Bilateral hydronephrosis     Chronic constipation     Anemia     Acute renal failure superimposed on stage 3 chronic kidney disease        Treatment Plan  Day 8 of IV antibiotics (currently on ceftriaxone).  Urology did recommend 14 days of culture specific antibiotics.  Both organisms (E. coli and Morganella) are susceptible to ceftriaxone.  Plan to transition over to oral Omnicef beginning today to complete 14 days of Abx treatment  Continue Smith  Will need outpatient renal US prior to in-office voiding trial; Urology recommends 1 week outpatient follow-up  Bowel regimen  BP trend reviewed; currently on  Coreg.  Plan to add low dose Norvasc today  Continue PT and OT  No events on telemetry; will plan to d/c cardiac telemetry today  Hopefully OOB to chair today; mobilize  Patient plans to go home at discharge and her daughter will assist in her care.  She does not want placement.  They are currently in the process of trying to establish with a primary care provider they can assist them with home health orders; I told patient this morning that if needed I can serve as a bridge for HH orders until PCP is established and she was very appreciative.  Dispo: possibly home 1-2 days with HH services  Lab holiday tomorrow.  Labs reviewed dating back to November 2023 and a component of chronic anemia noted.  I will add an iron profile to labs already sent.    Medical Decision Making  Number and Complexity of problems: Moderate complexity      Conditions and Status        Condition is improving.     [facility] Data  External documents reviewed: none  Cardiac tracing (EKG, telemetry) interpretation: sinus rhythm in the 60-80s on telemetry; occasional PACs; no other acute findings  Radiology interpretation: no new radiology studies  Labs reviewed: as above  Any tests that were considered but not ordered: none     Decision rules/scores evaluated (example [id number], Wells, etc): none     Discussed with: patient     Care Planning  Shared decision making: Discussed with patient with agreement to proceed with treatment plan as outlined  Code status and discussions: Full code    Disposition  Social Determinants of Health that impact treatment or disposition: Apparent at this time  I expect the patient to be discharged to home with home health services likely in 1-2 days; emphasized the patient needs to establish with a primary care provider and I did provide her with names of 2 local PCPs as well.    Electronically signed by Abdirahman Schroeder MD, 10/12/24, 10:46 CDT.

## 2024-10-12 NOTE — PLAN OF CARE
Goal Outcome Evaluation:  Plan of Care Reviewed With: patient        Progress: no change  Outcome Evaluation: A & O x 4, VSS, denies pain, mcclain catheter continues, stool incontinence continues, turning, IV ABX continue, safety maintained

## 2024-10-12 NOTE — PLAN OF CARE
Goal Outcome Evaluation:  Plan of Care Reviewed With: patient        Progress: no change  Outcome Evaluation: PT tx completed. Pt in bed and agreeable to therapy on arrival. Once PTA pulled back her blankets pt found to be visible soiled. Pt reports she was unaware she had an BM. Pt required full bed change. Rolled L and R with mod x1 several times for hygiene care, nsg assisted. Pt then too fatigued to perform further activity. Will cont to follow.

## 2024-10-12 NOTE — PLAN OF CARE
Goal Outcome Evaluation:  Plan of Care Reviewed With: patient           Outcome Evaluation: Patient c/o knee pain. PRN pain meds given with good relief. IID, IV abx, F/C to bsd. Large loose incontinent BM x2. PT/OT following. Turn q2. Safety maintained

## 2024-10-13 PROCEDURE — 25010000002 HEPARIN (PORCINE) PER 1000 UNITS: Performed by: INTERNAL MEDICINE

## 2024-10-13 RX ORDER — POLYETHYLENE GLYCOL 3350 17 G/17G
17 POWDER, FOR SOLUTION ORAL DAILY
Status: DISCONTINUED | OUTPATIENT
Start: 2024-10-14 | End: 2024-10-15 | Stop reason: HOSPADM

## 2024-10-13 RX ADMIN — SERTRALINE HYDROCHLORIDE 25 MG: 50 TABLET ORAL at 09:43

## 2024-10-13 RX ADMIN — HEPARIN SODIUM 5000 UNITS: 5000 INJECTION, SOLUTION INTRAVENOUS; SUBCUTANEOUS at 15:20

## 2024-10-13 RX ADMIN — ATORVASTATIN CALCIUM 40 MG: 40 TABLET, FILM COATED ORAL at 09:43

## 2024-10-13 RX ADMIN — AMLODIPINE BESYLATE 5 MG: 5 TABLET ORAL at 09:54

## 2024-10-13 RX ADMIN — OXYCODONE HYDROCHLORIDE 10 MG: 5 TABLET ORAL at 21:17

## 2024-10-13 RX ADMIN — CARVEDILOL 12.5 MG: 6.25 TABLET, FILM COATED ORAL at 21:12

## 2024-10-13 RX ADMIN — LEVOTHYROXINE SODIUM 50 MCG: 100 TABLET ORAL at 06:43

## 2024-10-13 RX ADMIN — HEPARIN SODIUM 5000 UNITS: 5000 INJECTION, SOLUTION INTRAVENOUS; SUBCUTANEOUS at 21:12

## 2024-10-13 RX ADMIN — OXYCODONE HYDROCHLORIDE 10 MG: 5 TABLET ORAL at 03:26

## 2024-10-13 RX ADMIN — DONEPEZIL HYDROCHLORIDE 10 MG: 10 TABLET, FILM COATED ORAL at 21:12

## 2024-10-13 RX ADMIN — HEPARIN SODIUM 5000 UNITS: 5000 INJECTION, SOLUTION INTRAVENOUS; SUBCUTANEOUS at 06:43

## 2024-10-13 RX ADMIN — CARVEDILOL 12.5 MG: 6.25 TABLET, FILM COATED ORAL at 09:54

## 2024-10-13 RX ADMIN — OXYCODONE HYDROCHLORIDE 10 MG: 5 TABLET ORAL at 09:54

## 2024-10-13 RX ADMIN — CEFDINIR 300 MG: 300 CAPSULE ORAL at 09:43

## 2024-10-13 RX ADMIN — PANTOPRAZOLE SODIUM 40 MG: 40 TABLET, DELAYED RELEASE ORAL at 06:43

## 2024-10-13 NOTE — PLAN OF CARE
Goal Outcome Evaluation:  Plan of Care Reviewed With: patient        Progress: no change  Outcome Evaluation: A & O x 4, VSS, c/o generalized pain, prn pain meds given with relief, mcclain catheter continues, turning, bed alarm set, safety maintained

## 2024-10-13 NOTE — PROGRESS NOTES
"    [facility] Medicine Services  INPATIENT PROGRESS NOTE    Patient Name: Jennifer Gomes  Date of Admission: 10/5/2024  Today's Date: 10/13/24  Length of Stay: 8  Primary Care Physician: Provider, No Known    Subjective   Chief Complaint: Weakness  HPI   Patient indicated that she was doing relatively well.  She was getting ready to eat lunch.  She did want to have a discussion regarding the events that occurred earlier during her hospital stay, and she even asked \"did I code?\"  We discussed what that meant, and she reported no recollection of those events.  Her only complaint this morning is that she is moving her bowels frequently and states that ever since she received the M&M enema her symptoms of constipation have resolved and in fact the frequency of her BMs is becoming difficult to control.  She indicated that she does not want any form of placement, and she feels confident that she will be able to go home.  She reports that her daughter, Ivonne, will greatly assist in her care 24/7.    Review of Systems   All pertinent negatives and positives are as above. All other systems have been reviewed and are negative unless otherwise stated.     Objective    Temp:  [97.6 °F (36.4 °C)-98.5 °F (36.9 °C)] 98.4 °F (36.9 °C)  Heart Rate:  [76-83] 76  Resp:  [18-20] 18  BP: (117-161)/(48-87) 150/50  Physical Exam  Vitals reviewed.   Constitutional:       General: She is not in acute distress.  HENT:      Head: Normocephalic.      Mouth/Throat:      Mouth: Mucous membranes are moist.   Pulmonary:      Effort: Pulmonary effort is normal. No respiratory distress.      Comments: On room air  Abdominal:      General: Abdomen is flat.      Palpations: Abdomen is soft.   Genitourinary:     Comments: Smith in place  Musculoskeletal:      Comments: SCDs in place   Skin:     General: Skin is warm.   Neurological:      General: No focal deficit present.      Mental Status: She is alert.      Motor: " "Weakness present.   Psychiatric:         Mood and Affect: Mood normal.         Results Review:  I have reviewed the labs, radiology results, and diagnostic studies.    Laboratory Data:   Results from last 7 days   Lab Units 10/09/24  0439 10/08/24  0216 10/07/24  1234 10/07/24  0220   WBC 10*3/mm3 7.93 10.92*  --  12.52*   HEMOGLOBIN g/dL 7.6* 7.8* 7.4* 7.2*   HEMATOCRIT % 24.5* 26.3* 23.8* 23.2*   PLATELETS 10*3/mm3 155 125*  --  127*        Results from last 7 days   Lab Units 10/12/24  0244 10/11/24  0523 10/09/24  0439   SODIUM mmol/L 140 139 138   POTASSIUM mmol/L 3.7 3.4* 3.9   CHLORIDE mmol/L 105 106 106   CO2 mmol/L 24.0 25.0 21.0*   BUN mg/dL 30* 36* 44*   CREATININE mg/dL 1.49* 1.58* 1.80*   CALCIUM mg/dL 9.3 9.2 9.1   GLUCOSE mg/dL 138* 127* 124*       Culture Data:   Blood Culture   Date Value Ref Range Status   10/07/2024 No growth at 4 days  Preliminary   10/07/2024 No growth at 4 days  Preliminary       Radiology Data:   Imaging Results (Last 24 Hours)       ** No results found for the last 24 hours. **            I have reviewed the patient's current medications.     Assessment/Plan   Assessment  Active Hospital Problems    Diagnosis     **Sepsis due to urinary tract infection     Acute urinary retention     Cardiopulmonary arrest     E coli bacteremia     Anemia requiring transfusions     Bilateral hydronephrosis     Chronic constipation     Anemia     Acute renal failure superimposed on stage 3 chronic kidney disease        Treatment Plan  Day 9 of 14 antibiotics today (transitioned 10/12 from IV to PO Abxs with Omnicef). Both organisms (E. coli and Morganella) are susceptible to ceftriaxone.    Continue Smith  Will need outpatient renal US prior to in-office voiding trial; Urology recommends 1 week outpatient follow-up  Bowel regimen - d/c lactulose.  Reduce Miralax from BID to daily dosing.  She indicates she is having frequent BMs now \"too frequent to control.\"  BP trend reviewed; currently on " Coreg.  Norvasc added 10/12; monitor BP trend  Continue PT and OT  Hopefully OOB to chair; mobilize  Patient plans to go home at discharge and her daughter will assist in her care.  She does not want placement.  They are currently in the process of trying to establish with a primary care provider that can assist them with home health orders; I told patient this morning that if needed I can serve as a bridge for HH orders until PCP is established and she was very appreciative.  Dispo: possibly home 1-2 days with HH services. I spoke with her daughter, Ivonne, via phone call this afternoon to provide clinical update  Lab holiday today  Labs reviewed dating back to November 2023 and a component of chronic anemia noted.      Medical Decision Making  Number and Complexity of problems: Moderate complexity      Conditions and Status        Condition is improving.     [facility] Data  External documents reviewed: none  Cardiac tracing (EKG, telemetry) interpretation: no new EKGs  Radiology interpretation: no new radiology studies  Labs reviewed: as above  Any tests that were considered but not ordered: none     Decision rules/scores evaluated (example [id number], Wells, etc): none     Discussed with: patient and daughter (Ivonne) via phone conversation     Care Planning  Shared decision making: Discussed with patient with agreement to proceed with treatment plan as outlined  Code status and discussions: Full code    Disposition  Social Determinants of Health that impact treatment or disposition: Apparent at this time  I expect the patient to be discharged to home with home health services likely in 1-2 days (likely tomorrow); emphasized the patient needs to establish with a primary care provider and I did provide her with names of 2 local PCPs as well.    Electronically signed by Abdirahman Schroeder MD, 10/13/24, 11:50 CDT.

## 2024-10-13 NOTE — PLAN OF CARE
Goal Outcome Evaluation:  Plan of Care Reviewed With: patient        Progress: improving  Outcome Evaluation: Pt c/o pain occasionally throughout shift. Medicated with PO pain meds per pt request. Safety maintained. A/o x4, confused/forgetful at times. No IV present. Up with PT asst. Turned Q2 hrs and PRN. No new skin breakdown noted. Tolearating RA. Refused stool softeners today, stated multiple loose BM yesterday/last night. Smith in place to BSD. Plan to dc home with family and HH soon. Cont to monitor.

## 2024-10-14 PROCEDURE — 97110 THERAPEUTIC EXERCISES: CPT

## 2024-10-14 PROCEDURE — 99232 SBSQ HOSP IP/OBS MODERATE 35: CPT | Performed by: CLINICAL NURSE SPECIALIST

## 2024-10-14 PROCEDURE — 97530 THERAPEUTIC ACTIVITIES: CPT

## 2024-10-14 PROCEDURE — 25010000002 HEPARIN (PORCINE) PER 1000 UNITS: Performed by: INTERNAL MEDICINE

## 2024-10-14 RX ADMIN — PANTOPRAZOLE SODIUM 40 MG: 40 TABLET, DELAYED RELEASE ORAL at 06:25

## 2024-10-14 RX ADMIN — LEVOTHYROXINE SODIUM 50 MCG: 100 TABLET ORAL at 06:25

## 2024-10-14 RX ADMIN — CARVEDILOL 12.5 MG: 6.25 TABLET, FILM COATED ORAL at 21:50

## 2024-10-14 RX ADMIN — HEPARIN SODIUM 5000 UNITS: 5000 INJECTION, SOLUTION INTRAVENOUS; SUBCUTANEOUS at 21:41

## 2024-10-14 RX ADMIN — CARVEDILOL 12.5 MG: 6.25 TABLET, FILM COATED ORAL at 08:53

## 2024-10-14 RX ADMIN — ATORVASTATIN CALCIUM 40 MG: 40 TABLET, FILM COATED ORAL at 08:53

## 2024-10-14 RX ADMIN — AMLODIPINE BESYLATE 5 MG: 5 TABLET ORAL at 08:53

## 2024-10-14 RX ADMIN — CEFDINIR 300 MG: 300 CAPSULE ORAL at 08:53

## 2024-10-14 RX ADMIN — HEPARIN SODIUM 5000 UNITS: 5000 INJECTION, SOLUTION INTRAVENOUS; SUBCUTANEOUS at 13:50

## 2024-10-14 RX ADMIN — OXYCODONE HYDROCHLORIDE 10 MG: 5 TABLET ORAL at 02:15

## 2024-10-14 RX ADMIN — SERTRALINE HYDROCHLORIDE 25 MG: 50 TABLET ORAL at 08:54

## 2024-10-14 RX ADMIN — OXYCODONE HYDROCHLORIDE 10 MG: 5 TABLET ORAL at 21:56

## 2024-10-14 RX ADMIN — HEPARIN SODIUM 5000 UNITS: 5000 INJECTION, SOLUTION INTRAVENOUS; SUBCUTANEOUS at 06:25

## 2024-10-14 RX ADMIN — DONEPEZIL HYDROCHLORIDE 10 MG: 10 TABLET, FILM COATED ORAL at 21:50

## 2024-10-14 RX ADMIN — CHOLECALCIFEROL TAB 10 MCG (400 UNIT) 800 UNITS: 10 TAB at 08:54

## 2024-10-14 RX ADMIN — OXYCODONE HYDROCHLORIDE 10 MG: 5 TABLET ORAL at 12:21

## 2024-10-14 NOTE — CASE MANAGEMENT/SOCIAL WORK
Continued Stay Note  [facility]     Patient Name: Jennifer Gomes  MRN: [id number]  Today's Date: 10/14/2024    Admit Date: 10/5/2024    Plan: [facility]- upon ins. precert approval   Discharge Plan       Row Name 10/14/24 1330       Plan    Plan [facility]- upon ins. precert approval    Patient/Family in Agreement with Plan yes    Plan Comments [facility] has offered pt a bed there and she accepted. Lisa Burnette- Director of  is starting ins. precert request. Will follow.      Row Name 10/14/24 1300       Plan    Plan Referral to [facility]    Patient/Family in Agreement with Plan yes    Plan Comments Pt wanting to be listed at [facility], informed Rachelle there of referral. Will follow for decision.                   Discharge Codes    No documentation.                 Expected Discharge Date and Time       Expected Discharge Date Expected Discharge Time    Oct 14, 2024               SARAH ArandaW

## 2024-10-14 NOTE — PROGRESS NOTES
"    [facility] Medicine Services  INPATIENT PROGRESS NOTE    Patient Name: Jennifer Gomes  Date of Admission: 10/5/2024  Today's Date: 10/14/24  Length of Stay: 9  Primary Care Physician: Provider, No Known    Subjective   Chief Complaint: Weakness  HPI   Patient stated today that she has concerns about being able to go home.  She indicated her weakness is more significant than she realized; \"I can't do diddly squat.\"  She says she has been to [facility] for rehab in the past, and is agreeable to this disposition plan if it is available.      Review of Systems   All pertinent negatives and positives are as above. All other systems have been reviewed and are negative unless otherwise stated.     Objective    Temp:  [97.6 °F (36.4 °C)-98.2 °F (36.8 °C)] 98.2 °F (36.8 °C)  Heart Rate:  [73-86] 73  Resp:  [16-18] 18  BP: (140-174)/(55-67) 140/58  Physical Exam  Vitals reviewed.   Constitutional:       General: She is not in acute distress.  HENT:      Head: Normocephalic.      Mouth/Throat:      Mouth: Mucous membranes are moist.   Pulmonary:      Effort: Pulmonary effort is normal. No respiratory distress.      Comments: On room air  Genitourinary:     Comments: Smith in place  Musculoskeletal:      Comments: SCDs in place   Neurological:      General: No focal deficit present.      Mental Status: She is alert.      Motor: Weakness present.   Psychiatric:         Mood and Affect: Mood normal.         Results Review:  I have reviewed the labs, radiology results, and diagnostic studies.    Laboratory Data:   Results from last 7 days   Lab Units 10/09/24  0439 10/08/24  0216   WBC 10*3/mm3 7.93 10.92*   HEMOGLOBIN g/dL 7.6* 7.8*   HEMATOCRIT % 24.5* 26.3*   PLATELETS 10*3/mm3 155 125*        Results from last 7 days   Lab Units 10/12/24  0244 10/11/24  0523 10/09/24  0439   SODIUM mmol/L 140 139 138   POTASSIUM mmol/L 3.7 3.4* 3.9   CHLORIDE mmol/L 105 106 106   CO2 mmol/L 24.0 25.0 21.0* "   BUN mg/dL 30* 36* 44*   CREATININE mg/dL 1.49* 1.58* 1.80*   CALCIUM mg/dL 9.3 9.2 9.1   GLUCOSE mg/dL 138* 127* 124*       Culture Data:   Blood Culture   Date Value Ref Range Status   10/07/2024 No growth at 4 days  Preliminary   10/07/2024 No growth at 4 days  Preliminary       Radiology Data:   Imaging Results (Last 24 Hours)       ** No results found for the last 24 hours. **            I have reviewed the patient's current medications.     Assessment/Plan   Assessment  Active Hospital Problems    Diagnosis     **Sepsis due to urinary tract infection     Acute urinary retention     Cardiopulmonary arrest     E coli bacteremia     Anemia requiring transfusions     Bilateral hydronephrosis     Chronic constipation     Anemia     Acute renal failure superimposed on stage 3 chronic kidney disease        Treatment Plan  Day 10 of 14 antibiotics today (transitioned 10/12 from IV to PO Abxs with Omnicef). Both organisms (E. coli and Morganella) are susceptible to ceftriaxone.    Continue Smith  Will need outpatient renal US prior to in-office voiding trial; Urology recommends 1 week outpatient follow-up  Bowel regimen now on hold  BP trend reviewed; currently on Coreg and Norvasc recently added.  May titrate BP med(s) pending trend  Continue PT and OT  OOB to chair; mobilize  Repeat labs in AM to confirm stability  Dispo: our initial plan was to go home today with her daughter and  services, even though we have been advocating for her to consider SNF.  Today, she realizes that going home given her current status from a physical/deconditioning/stamina/weakness perspective is not the best decision, and she is agreeable to us looking at placement at [facility] for rehab.      Medical Decision Making  Number and Complexity of problems: Moderate complexity      Conditions and Status        Condition is improving.     [facility] Data  External documents reviewed: none  Cardiac tracing (EKG, telemetry) interpretation: no new  EKGs  Radiology interpretation: no new radiology studies  Labs reviewed: no new labs  Any tests that were considered but not ordered: none     Decision rules/scores evaluated (example [id number], Wells, etc): none     Discussed with: patient and bedside nurse Pam     Care Planning  Shared decision making: Discussed with patient with agreement to proceed with treatment plan as outlined  Code status and discussions: Full code    Disposition  Social Determinants of Health that impact treatment or disposition: none apparent at this time  Patient now wanting us to look at rehab placement at [facility]    Electronically signed by Abdirahman Schroeder MD, 10/14/24, 12:50 CDT.

## 2024-10-14 NOTE — THERAPY TREATMENT NOTE
Acute Care - Physical Therapy Treatment Note  [facility]     Patient Name: Jennifer Gomes  : 1940  MRN: [id number]  Today's Date: 10/14/2024      Visit Dx:     ICD-10-CM ICD-9-CM   1. Sepsis without acute organ dysfunction, due to unspecified organism  A41.9 038.9     995.91   2. Acute cystitis without hematuria  N30.00 595.0   3. Gastroenteritis  K52.9 558.9   4. Acute metabolic encephalopathy  G93.41 348.31   5. Acute respiratory failure with hypoxia  J96.01 518.81   6. Dysphagia, unspecified type [R13.10]  R13.10 787.20   7. Impaired mobility [Z74.09]  Z74.09 799.89     Patient Active Problem List   Diagnosis    Gastroesophageal reflux disease    Spinal stenosis, lumbar region, without neurogenic claudication    Erosion of vaginal mesh    Adenocarcinoma of endometrium    S/P hysterectomy with oophorectomy    Encounter for follow-up surveillance of endometrial cancer    History of radiation therapy    Non-traumatic rhabdomyolysis    Acute renal failure superimposed on stage 3 chronic kidney disease    Medical non-compliance, does not take narcotics as prescribed    Chronic constipation    Chronic pain syndrome    Chronic prescription opiate use    Anemia    Hypothyroidism (acquired)    Essential hypertension    Venous insufficiency of both lower extremities    Chronic intractable headache    RAFAL (obstructive sleep apnea)    Acute encephalopathy due to UTI    Toxic metabolic encephalopathy    Polypharmacy    Frequent falls    Grade III internal hemorrhoids    External hemorrhoids    Colitis    Moderate malnutrition    Transaminitis    Acute UTI (urinary tract infection)    Polypharmacy    UTI (urinary tract infection)    Dementia    Hypokalemia    Sepsis due to urinary tract infection    Cardiopulmonary arrest    E coli bacteremia    Anemia requiring transfusions    Bilateral hydronephrosis    Acute urinary retention     Past Medical History:   Diagnosis Date    Anxiety     Arthritis     Bronchitis      Cancer     uterine    Chronic pain     Depression     Disease of thyroid gland     Fibromyalgia     GERD (gastroesophageal reflux disease)     Headache     History of transfusion     AS     Hyperlipidemia     Hypertension     Incontinence     Insomnia     Leg pain     Lumbar stenosis     Migraines     Peptic ulcer     Restless legs     Sleep apnea     NO C-PAP    UTI (urinary tract infection)     Vaginal bleeding      Past Surgical History:   Procedure Laterality Date    BLADDER REPAIR      MESH HAD TO BE REMOVED IN 2013    BREAST BIOPSY Right 2017    benign    BREAST CYST EXCISION Left 1982    CARDIAC CATHETERIZATION      CARPAL TUNNEL RELEASE      CATARACT EXTRACTION W/ INTRAOCULAR LENS  IMPLANT, BILATERAL      CHOLECYSTECTOMY WITH INTRAOPERATIVE CHOLANGIOGRAM N/A 2023    Procedure: CHOLECYSTECTOMY LAPAROSCOPIC INTRAOPERATIVE CHOLANGIOGRAM;  Surgeon: Gerardo Arciniega MD;  Location: [facility] OR;  Service: General;  Laterality: N/A;    COLONOSCOPY      COLONOSCOPY N/A 10/01/2021    Procedure: COLONOSCOPY WITH ANESTHESIA;  Surgeon: Tom Velasco DO;  Location: [facility] ENDOSCOPY;  Service: Gastroenterology;  Laterality: N/A;  pre: change in bowel habits  post: diverticulosis. hemorrhoids.   Olivia Mora APRN        CYSTECTOMY      D & C HYSTEROSCOPY N/A 2017    Procedure: DILATATION AND CURETTAGE HYSTEROSCOPY;  Surgeon: Shasta Madrigal MD;  Location: [facility] OR;  Service:     DILATION AND CURETTAGE, DIAGNOSTIC / THERAPEUTIC      ENDOSCOPY  2010    Short segment of Arriola's,Moderate chroninc esophagogastritis and negative H.pylori    ENDOSCOPY N/A 2017    Procedure: ESOPHAGOGASTRODUODENOSCOPY WITH ANESTHESIA;  Surgeon: Tom Velasco DO;  Location: [facility] ENDOSCOPY;  Service:     EYE SURGERY      RETINA    HEMORRHOIDECTOMY SIGMOIDOSCOPY N/A 3/21/2023    Procedure: HEMORRHOIDECTOMY WITH EXAM UNDER ANESTHESIA;  Surgeon: Holly Chavez MD;  Location: [facility] OR;   Service: General;  Laterality: N/A;    HYSTERECTOMY  12/20/2017    ORIF TIBIA/FIBULA FRACTURES Left 2000    TRANSVAGINAL TAPING SUSPENSION N/A 11/06/2017    Procedure: VAGINAL MESH REVISION;  Surgeon: Shasta Madrigal MD;  Location:  [facility] OR;  Service:     VAGINAL MESH REVISION  2013     PT Assessment (Last 12 Hours)       PT Evaluation and Treatment       Row Name 10/14/24 0937          Physical Therapy Time and Intention    Subjective Information complains of;pain;weakness  -LY     Document Type therapy note (daily note)  -LY     Mode of Treatment physical therapy  -LY       Row Name 10/14/24 0937          General Information    Existing Precautions/Restrictions fall  -LY       Row Name 10/14/24 0937          Pain    Pretreatment Pain Rating 6/10  -LY     Posttreatment Pain Rating 6/10  -LY     Pain Location extremity  -LY     Pain Intervention(s) Medication (See MAR);Ambulation/increased activity  -LY       Row Name 10/14/24 0937          Motor Skills    Comments, Therapeutic Exercise BLE AROM seated  -LY     Additional Documentation Comments, Therapeutic Exercise (Row)  -LY       Row Name 10/14/24 0937          Plan of Care Review    Plan of Care Reviewed With patient  -LY     Progress improving  -LY       Row Name 10/14/24 0937          Positioning and Restraints    Pre-Treatment Position in bed  -LY     Post Treatment Position bed  -LY     In Bed side lying right;call light within reach;encouraged to call for assist;exit alarm on  -LY               User Key  (r) = Recorded By, (t) = Taken By, (c) = Cosigned By      Initials Name Provider Type    LY Faith Thompson, PTA Physical Therapist Assistant                    Physical Therapy Education       Title: PT OT SLP Therapies (In Progress)       Topic: Physical Therapy (In Progress)       Point: Mobility training (In Progress)       Learning Progress Summary            Patient Acceptance, E, NR by KAVITA at 10/7/2024 5928    Comment: role of PT, call for assist, d/c  planning, high fall risk                      Point: Home exercise program (In Progress)       Learning Progress Summary            Patient Acceptance, E, NR by KAVITA at 10/7/2024 1158    Comment: role of PT, call for assist, d/c planning, high fall risk                      Point: Body mechanics (In Progress)       Learning Progress Summary            Patient Acceptance, E, NR by AJ at 10/7/2024 1158    Comment: role of PT, call for assist, d/c planning, high fall risk                      Point: Precautions (In Progress)       Learning Progress Summary            Patient Acceptance, E, NR by KAVITA at 10/7/2024 1158    Comment: role of PT, call for assist, d/c planning, high fall risk                                      User Key       Initials Effective Dates Name Provider Type Discipline     08/15/24 -  Sunny Esquivel, PT DPT Physical Therapist PT                  PT Recommendation and Plan  Anticipated Discharge Disposition (PT): skilled nursing facility  Plan of Care Reviewed With: patient  Progress: improving  Outcome Evaluation: PT tx completed. Pt in bed and agreeable to therapy on arrival. Once PTA pulled back her blankets pt found to be visible soiled. Pt reports she was unaware she had an BM. Pt required full bed change. Rolled L and R with mod x1 several times for hygiene care, nsg assisted. Pt then too fatigued to perform further activity. Will cont to follow.       Time Calculation:    PT Charges       Row Name 10/14/24 1039             Time Calculation    Start Time 0937  -LY      Stop Time 1000  -LY      Time Calculation (min) 23 min  -LY      PT Received On 10/14/24  -LY         Time Calculation- PT    Total Timed Code Minutes- PT 23 minute(s)  -LY         Timed Charges    01873 - PT Therapeutic Exercise Minutes 10  -LY      94670 - PT Therapeutic Activity Minutes 13  -LY         Total Minutes    Timed Charges Total Minutes 23  -LY       Total Minutes 23  -LY                User Key  (r) = Recorded By,  (t) = Taken By, (c) = Cosigned By      Initials Name Provider Type    Faith Rodriguez PTA Physical Therapist Assistant                  Therapy Charges for Today       Code Description Service Date Service Provider Modifiers Qty    18144841056 HC PT THER PROC EA 15 MIN 10/14/2024 Faith Thompson PTA GP 1    34655680390 HC PT THERAPEUTIC ACT EA 15 MIN 10/14/2024 Faith Thompson PTA GP 1            PT G-Codes  Outcome Measure Options: AM-PAC 6 Clicks Basic Mobility (PT)  AM-PAC 6 Clicks Score (PT): 9  AM-PAC 6 Clicks Score (OT): 12    Faith Thompson PTA  10/14/2024

## 2024-10-14 NOTE — CASE MANAGEMENT/SOCIAL WORK
Continued Stay Note   [facility]     Patient Name: Jennifer Gomes  MRN: [id number]  Today's Date: 10/14/2024    Admit Date: 10/5/2024    Plan: Home   Discharge Plan       Row Name 10/14/24 0834       Plan    Plan Home    Patient/Family in Agreement with Plan yes    Plan Comments Pt will return home with dtr/spouse upon d/c.  Spoke with Ivonne Liang Daughter [phone] and they do plan for pt to continue being at home.  They are aware that a PCP will need established before HH ordered.  Dtr says no further DME needed. Will follow.                   Discharge Codes    No documentation.                 Expected Discharge Date and Time       Expected Discharge Date Expected Discharge Time    Oct 12, 2024               JEFRY Aranda

## 2024-10-14 NOTE — CASE MANAGEMENT/SOCIAL WORK
Continued Stay Note   [place]     Patient Name: Jennifer Gomes  MRN: [id number]  Today's Date: 10/14/2024    Admit Date: 10/5/2024    Plan: Referral to [facility]   Discharge Plan       Row Name 10/14/24 1300       Plan    Plan Referral to [facility]    Patient/Family in Agreement with Plan yes    Plan Comments Pt wanting to be listed at [facility], informed Rachelle there of referral. Will follow for decision.                   Discharge Codes    No documentation.                 Expected Discharge Date and Time       Expected Discharge Date Expected Discharge Time    Oct 14, 2024               JEFRY Aranda

## 2024-10-14 NOTE — DISCHARGE PLACEMENT REQUEST
Sunny Esquivel, PT DPT   Physical Therapist  Physical Therapy     Therapy Evaluation     Signed     Date of Service: 10/07/24 1159  Creation Time: 10/07/24 1159     Signed       Expand All Collapse All    Patient Name: Jennifer Gomes                   : 1940                        MRN: [id number]                              Today's Date: 10/7/2024                                   Admit Date: 10/5/2024                        Visit Dx:   Visit Diagnosis       ICD-10-CM ICD-9-CM   1. Sepsis without acute organ dysfunction, due to unspecified organism  A41.9 038.9       995.91   2. Acute cystitis without hematuria  N30.00 595.0   3. Gastroenteritis  K52.9 558.9   4. Acute metabolic encephalopathy  G93.41 348.31   5. Acute respiratory failure with hypoxia  J96.01 518.81   6. Dysphagia, unspecified type [R13.10]  R13.10 787.20   7. Impaired mobility [Z74.09]  Z74.09 799.89         Problem List       Patient Active Problem List   Diagnosis    Gastroesophageal reflux disease    Spinal stenosis, lumbar region, without neurogenic claudication    Overweight    Non-smoker    Erosion of vaginal mesh    Adenocarcinoma of endometrium    Encounter for consultation    S/P hysterectomy with oophorectomy    Encounter for follow-up surveillance of endometrial cancer    History of radiation therapy    Obesity (BMI 30-39.9)    Non-traumatic rhabdomyolysis    Metabolic encephalopathy    Acute renal failure superimposed on stage 3 chronic kidney disease    Acute respiratory failure with hypoxia and hypercapnia    Medical non-compliance, does not take narcotics as prescribed    SIRS (systemic inflammatory response syndrome)    Chronic constipation    Chronic pain syndrome    Chronic prescription opiate use    Anemia    Hypothyroidism (acquired)    Essential hypertension    TOMÁS (acute kidney injury)    Venous insufficiency of both lower extremities    Fluid retention    Low blood pressure reading    Obesity, Class III, BMI 40-49.9  (morbid obesity)    Chronic intractable headache    RAFAL (obstructive sleep apnea)    Change in bowel habits    Acute encephalopathy due to UTI    Toxic metabolic encephalopathy    Polypharmacy    Frequent falls    Grade III internal hemorrhoids    External hemorrhoids    E. coli UTI    Colitis    Moderate malnutrition    Transaminitis    Acute cholecystitis    Acute UTI (urinary tract infection)    Polypharmacy    Altered mental status    UTI (urinary tract infection)    Bacteremia due to Enterococcus    Dementia    Hypokalemia    Sepsis    Sepsis due to urinary tract infection         Medical History        Past Medical History:   Diagnosis Date    Anxiety      Arthritis      Bronchitis      Cancer       uterine    Chronic pain      Depression      Disease of thyroid gland      Fibromyalgia      GERD (gastroesophageal reflux disease)      Headache      History of transfusion       AS     Hyperlipidemia      Hypertension      Incontinence      Insomnia      Leg pain      Lumbar stenosis      Migraines      Peptic ulcer      Restless legs      Sleep apnea       NO C-PAP    UTI (urinary tract infection)      Vaginal bleeding           Surgical History         Past Surgical History:   Procedure Laterality Date    BLADDER REPAIR        MESH HAD TO BE REMOVED IN 2013    BREAST BIOPSY Right 2017     benign    BREAST CYST EXCISION Left     CARDIAC CATHETERIZATION        CARPAL TUNNEL RELEASE        CATARACT EXTRACTION W/ INTRAOCULAR LENS  IMPLANT, BILATERAL        CHOLECYSTECTOMY WITH INTRAOPERATIVE CHOLANGIOGRAM N/A 2023     Procedure: CHOLECYSTECTOMY LAPAROSCOPIC INTRAOPERATIVE CHOLANGIOGRAM;  Surgeon: Gerardo Arciniega MD;  Location: [facility] OR;  Service: General;  Laterality: N/A;    COLONOSCOPY        COLONOSCOPY N/A 10/01/2021     Procedure: COLONOSCOPY WITH ANESTHESIA;  Surgeon: Tom Velasco DO;  Location: [facility] ENDOSCOPY;  Service: Gastroenterology;  Laterality: N/A;  pre: change in bowel  habits  post: diverticulosis. hemorrhoids.   Olivia Mora, APRN          CYSTECTOMY        D & C HYSTEROSCOPY N/A 11/06/2017     Procedure: DILATATION AND CURETTAGE HYSTEROSCOPY;  Surgeon: Shasta Madrigal MD;  Location: [facility] OR;  Service:     DILATION AND CURETTAGE, DIAGNOSTIC / THERAPEUTIC   2008    ENDOSCOPY   09/23/2010     Short segment of Arriola's,Moderate chroninc esophagogastritis and negative H.pylori    ENDOSCOPY N/A 09/25/2017     Procedure: ESOPHAGOGASTRODUODENOSCOPY WITH ANESTHESIA;  Surgeon: Tom Velasco DO;  Location: [facility] ENDOSCOPY;  Service:     EYE SURGERY         RETINA    HEMORRHOIDECTOMY SIGMOIDOSCOPY N/A 3/21/2023     Procedure: HEMORRHOIDECTOMY WITH EXAM UNDER ANESTHESIA;  Surgeon: Holly Chavez MD;  Location: [facility] OR;  Service: General;  Laterality: N/A;    HYSTERECTOMY   12/20/2017    ORIF TIBIA/FIBULA FRACTURES Left 2000    TRANSVAGINAL TAPING SUSPENSION N/A 11/06/2017     Procedure: VAGINAL MESH REVISION;  Surgeon: Shasta Madrigal MD;  Location: [facility] OR;  Service:     VAGINAL MESH REVISION   2013           General Information         Row Name 10/07/24 1005                 Physical Therapy Time and Intention     Document Type evaluation  cc: AMS with weakness. Dx: Acute renal failure, UTI, sepsis. Code blue, code chill, intubated 10/5. Extubated 10/6  -AJ       Mode of Treatment physical therapy  -          Row Name 10/07/24 1005                 General Information     Patient Profile Reviewed yes  -AJ       Prior Level of Function independent:;all household mobility;ADL's;max assist:;dependent:;home management;community mobility  Pt demo intermittent confusion, told RN she was bed bound but tells PT/OT independent at moves around at home  -AJ       Existing Precautions/Restrictions fall;other (see comments)  mcclain, NG tube  -AJ       Barriers to Rehab medically complex;previous functional deficit  -          Row Name 10/07/24 1005                 Living  Environment     People in Home spouse;child(sebastian), adult  -          Row Name 10/07/24 1005                 Home Main Entrance     Number of Stairs, Main Entrance four  -       Stair Railings, Main Entrance none  -          Row Name 10/07/24 1005                 Stairs Within Home, Primary     Number of Stairs, Within Home, Primary none  -          Row Name 10/07/24 1005                 Cognition     Orientation Status (Cognition) oriented to;person;time  -          Row Name 10/07/24 1005                 Safety Issues, Functional Mobility     Safety Issues Affecting Function (Mobility) ability to follow commands;awareness of need for assistance;insight into deficits/self-awareness;safety precaution awareness;safety precautions follow-through/compliance;impulsivity;friction/shear risk;sequencing abilities  -       Impairments Affecting Function (Mobility) balance;cognition;endurance/activity tolerance;pain;strength  -       Cognitive Impairments, Mobility Safety/Performance attention;awareness, need for assistance;insight into deficits/self-awareness;judgment;problem-solving/reasoning;safety precaution awareness;safety precaution follow-through  -                       User Key  (r) = Recorded By, (t) = Taken By, (c) = Cosigned By        Initials Name Provider Type     AJ Sunny Esquivel, PT DPT Physical Therapist                            Mobility         Row Name 10/07/24 1005                 Bed Mobility     Bed Mobility supine-sit;sit-supine  -       Supine-Sit [place] (Bed Mobility) maximum assist (25% patient effort);2 person assist;verbal cues;nonverbal cues (demo/gesture)  -       Sit-Supine [place] (Bed Mobility) maximum assist (25% patient effort);2 person assist;verbal cues;nonverbal cues (demo/gesture)  -       Assistive Device (Bed Mobility) bed rails;draw sheet;head of bed elevated  -          Row Name 10/07/24 1005                 Bed-Chair Transfer     Bed-Chair  "[place] (Transfers) unable to assess  -          Row Name 10/07/24 1005                 Sit-Stand Transfer     Sit-Stand [place] (Transfers) maximum assist (25% patient effort);2 person assist;verbal cues;nonverbal cues (demo/gesture)  -       Assistive Device (Sit-Stand Transfers) other (see comments)  -       Comment, (Sit-Stand Transfer) HHA x2  -          Row Name 10/07/24 1005                 Gait/Stairs (Locomotion)     [place] Level (Gait) unable to assess  -                       User Key  (r) = Recorded By, (t) = Taken By, (c) = Cosigned By        Initials Name Provider Type     Sunny Tsang, PT DPT Physical Therapist                            Obj/Interventions         Row Name 10/07/24 1005                 Range of Motion Comprehensive     General Range of Motion lower extremity range of motion deficits identified  -       Comment, General Range of Motion pt performed b knee ext to 50% in B LE but refused attempt at hip flexion \"who sits and marches at home\"  -          Row Name 10/07/24 1005                 Strength Comprehensive (MMT)     General Manual Muscle Testing (MMT) Assessment lower extremity strength deficits identified  -       Comment, General Manual Muscle Testing (MMT) Assessment No formal MMT performed  -          Row Name 10/07/24 1005                 Balance     Balance Assessment sitting dynamic balance;sitting static balance;standing static balance  -       Static Sitting Balance contact guard  -       Dynamic Sitting Balance contact guard;minimal assist  -       Position, Sitting Balance supported;sitting edge of bed  -       Static Standing Balance maximum assist;2-person assist  -       Position/Device Used, Standing Balance supported;other (see comments)  [facility]  -       Balance Interventions sitting;standing;sit to stand;supported;static;dynamic  -                       User Key  (r) = Recorded By, (t) = Taken By, (c) = Cosigned By "        Initials Name Provider Type     Sunny Tsang, PT DPT Physical Therapist                            Goals/Plan         Row Name 10/07/24 1005                 Bed Mobility Goal 1 (PT)     Activity/Assistive Device (Bed Mobility Goal 1, PT) bed mobility activities, all;rolling to left;rolling to right;supine to sit;sit to supine  -AJ       [place] Level/Cues Needed (Bed Mobility Goal 1, PT) minimum assist (75% or more patient effort)  -AJ       Time Frame (Bed Mobility Goal 1, PT) long term goal (LTG);10 days  -AJ       Progress/Outcomes (Bed Mobility Goal 1, PT) new goal  -AJ          Row Name 10/07/24 1005                 Transfer Goal 1 (PT)     Activity/Assistive Device (Transfer Goal 1, PT) sit-to-stand/stand-to-sit;bed-to-chair/chair-to-bed;toilet;wheelchair transfer  -AJ       [place] Level/Cues Needed (Transfer Goal 1, PT) minimum assist (75% or more patient effort)  -AJ       Time Frame (Transfer Goal 1, PT) long term goal (LTG);10 days  -AJ       Progress/Outcome (Transfer Goal 1, PT) new goal  -AJ          Row Name 10/07/24 1005                 Gait Training Goal 1 (PT)     Activity/Assistive Device (Gait Training Goal 1, PT) gait (walking locomotion);decrease asymmetrical patterns;decrease fall risk;diminish gait deviation;forward stepping;improve balance and speed;increase endurance/gait distance;increase energy conservation;assistive device use;walker, rolling  -AJ       [place] Level (Gait Training Goal 1, PT) minimum assist (75% or more patient effort)  -AJ       Distance (Gait Training Goal 1, PT) 25'  -AJ       Time Frame (Gait Training Goal 1, PT) long term goal (LTG);10 days  -AJ       Progress/Outcome (Gait Training Goal 1, PT) new goal  -          Row Name 10/07/24 1005                 Problem Specific Goal 1 (PT)     Problem Specific Goal 1 (PT) Pt will participate in session with min verbal cueing and report pain 4/10 or less  -AJ       Time Frame (Problem Specific  Goal 1, PT) long-term goal (LTG);by discharge  -       Progress/Outcome (Problem Specific Goal 1, PT) new goal  -          Row Name 10/07/24 1005                 Therapy Assessment/Plan (PT)     Planned Therapy Interventions (PT) balance training;bed mobility training;gait training;home exercise program;postural re-education;transfer training;patient/family education;strengthening;neuromuscular re-education;ROM (range of motion);stretching  -                    User Key  (r) = Recorded By, (t) = Taken By, (c) = Cosigned By        Initials Name Provider Type     Sunny Tsang, HUNTER DPT Physical Therapist                         Clinical Impression         Row Name 10/07/24 1005                 Pain     Pretreatment Pain Rating 8/10  -       Posttreatment Pain Rating 8/10  -       Pain Location - Side/Orientation Right  -       Pain Location generalized  -       Pain Location - knee;back;buttock  -       Pain Intervention(s) Nursing Notified;Repositioned;Ambulation/increased activity  -          Row Name 10/07/24 1005                 Plan of Care Review     Plan of Care Reviewed With patient  -       Outcome Evaluation PT eval complete. Pt in fowlers position, AO to person and time with intermittent confusion with situation awareness. pt reports pain in back, knee and chest and was independent prior to admission. however, the pt reported she was bed bound per RN. Today pt was required Max Ax2 for bed mobility and sitting<>stand today and required frequent verbal cues and tactile cues to maintain sitting balance. Pt reported increased R knee pain with standing which also limited ability and prevented side steps and further ambulation. Pt returned to supine and left in fowlers position, and bed alarm reactivated. pt will benefit from skilled PT services to improve bed mobility, t/f, and decrease fall risk. Recommend SNF when medically stable.  -          Row Name 10/07/24 1001                  Therapy Assessment/Plan (PT)     Patient/Family Therapy Goals Statement (PT) none stated  -AJ       Rehab Potential (PT) fair, will monitor progress closely  -AJ       Criteria for Skilled Interventions Met (PT) yes;meets criteria;skilled treatment is necessary  -AJ       Therapy Frequency (PT) 2 times/day  -AJ       Predicted Duration of Therapy Intervention (PT) until d/c  -AJ          Row Name 10/07/24 1005                 Vital Signs     Pre Patient Position Supine  -AJ       Intra Patient Position Standing  -AJ       Post Patient Position Supine  -AJ          Row Name 10/07/24 1005                 Positioning and Restraints     Pre-Treatment Position in bed  -AJ       Post Treatment Position bed  -AJ       In Bed notified nsg;fowlers;call light within reach;encouraged to call for assist;exit alarm on;side rails up x3;patient within staff view  -                       User Key  (r) = Recorded By, (t) = Taken By, (c) = Cosigned By        Initials Name Provider Type     Sunny Tsang, PT DPT Physical Therapist                            Outcome Measures         Row Name 10/07/24 1005                 How much help from another person do you currently need...     Turning from your back to your side while in flat bed without using bedrails? 2  -AJ       Moving from lying on back to sitting on the side of a flat bed without bedrails? 2  -AJ       Moving to and from a bed to a chair (including a wheelchair)? 1  -AJ       Standing up from a chair using your arms (e.g., wheelchair, bedside chair)? 2  -AJ       Climbing 3-5 steps with a railing? 1  -AJ       To walk in hospital room? 1  -AJ       AM-PAC 6 Clicks Score (PT) 9  -AJ       Highest Level of Mobility Goal 3 --> Sit at edge of bed  -AJ          Row Name 10/07/24 1010 10/07/24 1005            Functional Assessment     Outcome Measure Options AM-PAC 6 Clicks Daily Activity (OT)  -LS AM-PAC 6 Clicks Basic Mobility (PT)  -                     User Key  (r) =  Recorded By, (t) = Taken By, (c) = Cosigned By        Initials Name Provider Type     LS Faith Purvis, OTR/L Occupational Therapist     Sunny Tsang, PT DPT Physical Therapist                          Physical Therapy Education            Title: PT OT SLP Therapies (In Progress)         Topic: Physical Therapy (In Progress)         Point: Mobility training (In Progress)         Learning Progress Summary               Patient Acceptance, E, NR by KAVITA at 10/7/2024 1158     Comment: role of PT, call for assist, d/c planning, high fall risk                               Point: Home exercise program (In Progress)         Learning Progress Summary               Patient Acceptance, E, NR by KAVITA at 10/7/2024 1158     Comment: role of PT, call for assist, d/c planning, high fall risk                               Point: Body mechanics (In Progress)         Learning Progress Summary               Patient Acceptance, E, NR by KAVITA at 10/7/2024 1158     Comment: role of PT, call for assist, d/c planning, high fall risk                               Point: Precautions (In Progress)         Learning Progress Summary               Patient Acceptance, E, NR by KAVITA at 10/7/2024 1158     Comment: role of PT, call for assist, d/c planning, high fall risk                                                   User Key         Initials Effective Dates Name Provider Type Discipline     KAVITA 08/15/24 -  Sunny Esquivel, PT DPT Physical Therapist PT                          PT Recommendation and Plan  Planned Therapy Interventions (PT): balance training, bed mobility training, gait training, home exercise program, postural re-education, transfer training, patient/family education, strengthening, neuromuscular re-education, ROM (range of motion), stretching  Plan of Care Reviewed With: patient  Outcome Evaluation: PT eval complete. Pt in fowlers position, AO to person and time with intermittent confusion with situation awareness. pt reports pain in  back, knee and chest and was independent prior to admission. however, the pt reported she was bed bound per RN. Today pt was required Max Ax2 for bed mobility and sitting<>stand today and required frequent verbal cues and tactile cues to maintain sitting balance. Pt reported increased R knee pain with standing which also limited ability and prevented side steps and further ambulation. Pt returned to supine and left in fowlers position, and bed alarm reactivated. pt will benefit from skilled PT services to improve bed mobility, t/f, and decrease fall risk. Recommend SNF when medically stable.      Time Calculation:        PT Charges         Row Name 10/07/24 1005                       Time Calculation     Start Time 1005  -AJ         Stop Time 1049  +10 for chart review, and staff communication  -         Time Calculation (min) 44 min  -AJ         PT Received On 10/07/24  -AJ         PT Goal Re-Cert Due Date 10/17/24  -AJ                 Untimed Charges     PT Eval/Re-eval Minutes 54  -AJ                 Total Minutes     Untimed Charges Total Minutes 54  -AJ          Total Minutes 54  -AJ                      User Key  (r) = Recorded By, (t) = Taken By, (c) = Cosigned By        Initials Name Provider Type     Snuny Tsang PT DPT Physical Therapist                       Therapy Charges for Today         Code Description Service Date Service Provider Modifiers Qty     [id number] HC PT EVAL MOD COMPLEXITY 4 10/7/2024 Sunny Esquivel PT DPRAFAELA GP 1                PT G-Codes  Outcome Measure Options: AM-PAC 6 Clicks Daily Activity (OT)  AM-PAC 6 Clicks Score (PT): 9  AM-PAC 6 Clicks Score (OT): 11  PT Discharge Summary  Anticipated Discharge Disposition (PT): skilled nursing facility     Sunny Esquivel PT DPT                        10/7/2024

## 2024-10-14 NOTE — PLAN OF CARE
Goal Outcome Evaluation:   A&OX4. VSS. Pt forgetful and sleepy. C/o generalized pain, PRN analgesic administered per pt request. F/C continuing and is to be discharged with it; no pain or redness noted. No IV access. Pt refused to turn throughout day, preventative silicone foam dressing applied to sacrum. Pt worked with PT. Safety maintained, bed alarm set. Pt approved to be discharged to [facility] when medically ready.

## 2024-10-14 NOTE — DISCHARGE PLACEMENT REQUEST
"Jennifer Soliman (84 y.o. Female)       Date of Birth   1940    Social Security Number       Address   [address] [facility] [address]    Home Phone   [phone]    MRN   [id number]       Restorationism   Latter day    Marital Status                               Admission Date   10/5/24    Admission Type   Emergency    Admitting Provider   Abdirahman Schroeder MD    Attending Provider   Abdirahman Schroeder MD    Department, Room/Bed   [facility] 3A, 331/1       Discharge Date       Discharge Disposition       Discharge Destination                                 Attending Provider: Abdirahman Schroeder MD    Allergies: Ropinirole Hcl, Codeine, Definity [Perflutren Lipid Microsphere], Ambien [Zolpidem], Eszopiclone, Pregabalin, Tizanidine    Isolation: None   Infection: None   Code Status: CPR    Ht: 157.5 cm (62\")   Wt: 85.9 kg (189 lb 4.8 oz)    Admission Cmt: None   Principal Problem: Sepsis due to urinary tract infection [A41.9,N39.0]                   Active Insurance as of 10/5/2024       Primary Coverage       Payor Plan Insurance Group Employer/Plan Group    [facility] MEDICARE REPLACEMENT [facility] MED ADV GROUP [id number]       Payor Plan Address Payor Plan Phone Number Payor Plan Fax Number Effective Dates    [address] [address]   5/1/2024 - None Entered    [facility] [id number]         Subscriber Name Subscriber Birth Date Member ID       JENNIFER SOLIMAN 1940 [id number]                     Emergency Contacts        (Rel.) Home Phone Work Phone Mobile Phone    Ivonne Liang (Daughter) [phone] -- --    Adams Soliman (Spouse) [phone] -- [phone]                 History & Physical        Han Gibbs APRN at 10/05/24 0430       Attestation signed by Zeinab Trujillo MD at 10/05/24 1026    Patient seen and examined personally.  Chart reviewed.  Discussed with NP.  Agree with assessment and plan.    S/p respiratory arrest in the ER, 5-10 min " CPR performed per report  Patient transferred to the ICU on the Vent with hypothermia  Intubated, sedated  Pupils equal  + spontaneous, purposeful movement  RRR  Coarse bs bilat  Soft, ND  No c/c/e    Sepsis secondary to UTI - continue with fluids for now.  Cultures pending.  Received zosyn.  Started on cefepime.    S/p cardiopulmonary arrest - no indication for hypothermia protocol.  Events unclear.  Immediate medical intervention.  Will start to wean sedation.  Patient with purposeful movement with decreased sedation.    Acute renal failure - likely multifactorial.  CT scan abd suggestive of obstructive process contributing.  Smith placed.  Monitor response to IVF.  No diuretics at this time.   CT abd 10/5:  IMPRESSION:  1. Moderately distended urinary bladder with bilateral hydronephrosis  and ureteral dilation compatible with vesicoureteral reflux.  2. Indeterminant small amount of air within the RIGHT renal collecting  system and upper RIGHT ureter. Correlate with urinalysis.  3. Constipation.  4. No mass, bowel obstruction, or abscess.    Acute hypoxic hypercarbic respiratory failure - Patient was initially DNR/DNI.  Now on vent.  Will discuss goals of care with family.  Working towards rapid extubation.    DVT ppx - Hep sq q8  GI ppx - ppi  DNR per family discussions with NP  40min CCT     Zeinab Trujillo MD  Pulmonary, Critical Care, & Sleep                       [facility] Intensivist Services    Date of Admission: 10/5/2024  Date of Note: 10/05/24  Primary Care Physician: Provider, No Known    History   Mrs. Gomes is an 84-year-old female with past medical history of hypertension, GERD, chronic kidney disease stage III, and chronic pain who presented to [facility] emergency department early this morning via EMS for altered mental status with weakness. My HPI comes from ER provider, Dr. Castillo, and daughter (Ivonne) who is her medical decision maker and care  provider who explains that Mrs. Gomes has had loose stool for the last week causing her to be incontinent at times.  Daughter explains that the stool is loose and brown without evidence of blood, and is foul smelling. She reports urinary incontinence that is also foul-smelling and blood-tinged.  She explains that early this morning the patient was up to the bedside commode and became very disoriented and weak therefore they called the ambulance to bring her to the hospital for further evaluation. Ivnone advises that Mrs. Gomes is a DNR/DNI    ED course workup is significant for pt with altered mental status and vital signs of /55, heart rate 113, respirations 30, 94% on 2 L per nasal cannula, and temperature 103.7.   Acute on chronic renal failure with BUN 47 and creatinine 2.18  WBC 22K  H&H 7.4/22.8  CXR: reviewed by me showing chronic right elevated hemidiaphragm moderate pulmonary edema     Critical care team was asked to evaluate patient for admission to the unit for further management of acute medical needs.     I have seen and evaluated Mrs. Gomes while she is in the emergency department and find the patient confused, moaning, appears uncomfortable, does not respond to questioning. She is tachypneic with audible wheezes.  I have asked ER provider to have respiratory administer nebulizer treatment and have accepted patient for admission.   Past Medical History     Active and Resolved Problems  Active Hospital Problems    Diagnosis  POA    **Sepsis [A41.9]  Yes      Resolved Hospital Problems   No resolved problems to display.       Past Medical History:   Past Medical History:   Diagnosis Date    Anxiety     Arthritis     Bronchitis     Cancer     uterine    Chronic pain     Depression     Disease of thyroid gland     Fibromyalgia     GERD (gastroesophageal reflux disease)     Headache     History of transfusion     AS     Hyperlipidemia     Hypertension     Incontinence     Insomnia     Leg pain      Lumbar stenosis     Migraines     Peptic ulcer     Restless legs     Sleep apnea     NO C-PAP    UTI (urinary tract infection)     Vaginal bleeding        Prior Surgeries: She  has a past surgical history that includes Cystectomy; Esophagogastroduodenoscopy (09/23/2010); Dilation and curettage, diagnostic / therapeutic (2008); Bladder repair (2011); ORIF tibia & fibula fractures (Left, 2000); Vaginal Mesh Revision (2013); Cataract extraction w/ intraocular lens  implant, bilateral; Carpal tunnel release; Esophagogastroduodenoscopy (N/A, 09/25/2017); transvaginal taping suspension (N/A, 11/06/2017); d & c hysteroscopy (N/A, 11/06/2017); Hysterectomy (12/20/2017); Breast cyst excision (Left, 1982); Cardiac catheterization; Breast biopsy (Right, 2017); Colonoscopy; Colonoscopy (N/A, 10/01/2021); Eye surgery; hemorrhoidectomy sigmoidoscopy (N/A, 3/21/2023); and cholecystectomy with intraoperative cholangiogram (N/A, 9/7/2023).    Past Surgical History:   Past Surgical History:   Procedure Laterality Date    BLADDER REPAIR  2011    MESH HAD TO BE REMOVED IN 2013    BREAST BIOPSY Right 2017    benign    BREAST CYST EXCISION Left 1982    CARDIAC CATHETERIZATION      CARPAL TUNNEL RELEASE      CATARACT EXTRACTION W/ INTRAOCULAR LENS  IMPLANT, BILATERAL      CHOLECYSTECTOMY WITH INTRAOPERATIVE CHOLANGIOGRAM N/A 9/7/2023    Procedure: CHOLECYSTECTOMY LAPAROSCOPIC INTRAOPERATIVE CHOLANGIOGRAM;  Surgeon: Gerardo Arciniega MD;  Location: [facility] OR;  Service: General;  Laterality: N/A;    COLONOSCOPY      COLONOSCOPY N/A 10/01/2021    Procedure: COLONOSCOPY WITH ANESTHESIA;  Surgeon: Tom Velasco DO;  Location: [facility] ENDOSCOPY;  Service: Gastroenterology;  Laterality: N/A;  pre: change in bowel habits  post: diverticulosis. hemorrhoids.   Olivia Mora APRN        CYSTECTOMY      D & C HYSTEROSCOPY N/A 11/06/2017    Procedure: DILATATION AND CURETTAGE HYSTEROSCOPY;  Surgeon: Shasta Madrigal MD;  Location: [facility] OR;   Service:     DILATION AND CURETTAGE, DIAGNOSTIC / THERAPEUTIC  2008    ENDOSCOPY  09/23/2010    Short segment of Arriola's,Moderate chroninc esophagogastritis and negative H.pylori    ENDOSCOPY N/A 09/25/2017    Procedure: ESOPHAGOGASTRODUODENOSCOPY WITH ANESTHESIA;  Surgeon: Tom Velasco DO;  Location: [facility] ENDOSCOPY;  Service:     EYE SURGERY      RETINA    HEMORRHOIDECTOMY SIGMOIDOSCOPY N/A 3/21/2023    Procedure: HEMORRHOIDECTOMY WITH EXAM UNDER ANESTHESIA;  Surgeon: Holly Chavez MD;  Location: [facility] OR;  Service: General;  Laterality: N/A;    HYSTERECTOMY  12/20/2017    ORIF TIBIA/FIBULA FRACTURES Left 2000    TRANSVAGINAL TAPING SUSPENSION N/A 11/06/2017    Procedure: VAGINAL MESH REVISION;  Surgeon: Shasta Madrigal MD;  Location:  [facility] OR;  Service:     VAGINAL MESH REVISION  2013       Social and Family History     Family History:  family history includes Cancer in her paternal grandmother; Diabetes in her mother and sister; Lung cancer in her paternal grandfather; Lymphoma in her brother; Multiple myeloma in her mother; Ovarian cancer in her paternal aunt; Prostate cancer in her brother; Stroke in her father.    Tobacco/Social History:  reports that she has never smoked. She has never used smokeless tobacco. She reports that she does not currently use alcohol. She reports that she does not use drugs.    Allergies     Allergies:   She is allergic to ropinirole hcl, codeine, definity [perflutren lipid microsphere], ambien [zolpidem], eszopiclone, pregabalin, and tizanidine.    Allergies   Allergen Reactions    Ropinirole Hcl Shortness Of Breath    Codeine Itching and Mental Status Change    Definity [Perflutren Lipid Microsphere] Other (See Comments)     Severe back pain    Ambien [Zolpidem] Other (See Comments)     HYPER     Eszopiclone Other (See Comments)     MAKES PT HYPER     Pregabalin Dizziness    Tizanidine Other (See Comments)     Terrible nightmares       Labs     Basic Labs:  Common  "labs          8/7/2024    11:38 9/11/2024    13:40 10/5/2024    02:01   Common Labs   Glucose 98     99     165    BUN 27     37     47    Creatinine 1.6     1.7     2.18    Sodium 139     140     133    Potassium 4.3     5.1     4.5    Chloride 103     105     97    Calcium 9.1     9.2     9.0    Albumin 3.4     3.5     3.1    Total Bilirubin 0.2     0.2     0.5    Alkaline Phosphatase 78     77     70    AST (SGOT) 13     12     19    ALT (SGPT) 10     5     12    WBC 6.2     5.9     22.64    Hemoglobin 8.9     9.3     7.4    Hematocrit 29.3     30.0     22.8    Platelets 182     131     213       Details          This result is from an external source.               Diabetic:      Inpatient Medications     Medications: Scheduled Meds:cefepime, 2,000 mg, Intravenous, Once  cefepime, 2,000 mg, Intravenous, Q24H  heparin (porcine), 5,000 Units, Subcutaneous, Q12H  sodium chloride, 10 mL, Intravenous, Q12H      Continuous Infusions:sodium chloride, 75 mL/hr      PRN Meds:.  acetaminophen **OR** acetaminophen **OR** acetaminophen    senna-docusate sodium **AND** [DISCONTINUED] polyethylene glycol **AND** bisacodyl **AND** bisacodyl    Calcium Replacement - Follow Nurse / BPA Driven Protocol    Magnesium Low Dose Replacement - Follow Nurse / BPA Driven Protocol    ondansetron ODT **OR** ondansetron    Phosphorus Replacement - Follow Nurse / BPA Driven Protocol    Potassium Replacement - Follow Nurse / BPA Driven Protocol    sodium chloride    sodium chloride    I have reviewed the patient's current medications.   Outpatient Medications     Outpatient Medications:   No outpatient medications have been marked as taking for the 10/5/24 encounter (Hospital Encounter).       Current Antibiotics     cefepime 2000 mg IVPB in 100 mL NS (MBP)    Exam     Vitals: Her  height is 157.5 cm (62\") and weight is 79.6 kg (175 lb 6.4 oz). Her rectal temperature is 103.7 °F (39.8 °C) (abnormal). Her blood pressure is 160/55 and her pulse " "is 113. Her respiration is 30 (abnormal) and oxygen saturation is 94%.     GENERAL:  Alert, confused, moaning, does not respond to questioning   SKIN:  Warm, moist   EYES:  Pupils equal, round and reactive to light.  EOMs intact.    HEAD:  Normocephalic.  NECK:  Supple   RESP: With audible wheezes  CARDIAC: Sinus tachycardia. Normal S1,S2. No edema  GI:  Soft, moans to palpation throughout abdomen, normal bowel sounds  MSK:  Normal muscle bulk, tone  NEUROLOGICAL: Altered mental status. moves all extremities but does not follow commands.  PSYCHIATRIC: uncomfortable    Results and Cultures Review     Result Review:  I have personally reviewed the results from the time of this admission to 10/5/2024 04:37 CDT and agree with these findings:  [x]  Laboratory list / accordion  [x]  Microbiology  [x]  Radiology  []  EKG/Telemetry   []  Cardiology/Vascular   []  Pathology  [x]  Old records  []  Other:  Most notable findings include: per HPI      Culture Data:   No results found for: \"BLOODCX\", \"URINECX\", \"WOUNDCX\", \"MRSACX\", \"RESPCX\", \"STOOLCX\"    Assessment/Plan   *Acute renal failure  *Urinary tract infection  *Sepsis  *Altered mental status    This is an 84-year-old female who presented to [facility] emergency department via EMS for altered mental status and weakness.  The patient lives at home with her  and daughter who helps provide care.  The patient's daughter, Lola, explains that the patient is normally very high functioning and uses a walker to ambulate, however became very weak early this morning and was then unable to get off the commode and had altered mental status.  Daughter reported 1 week history of loose foul-smelling bowel movements and urinary incontinence that is also very foul-smelling.  ED course workup revealed patient to have urinary tract infection with acute on chronic renal failure and is meeting sepsis criteria.  Care team was asked to evaluate patient for " admission    Patient will be admitted to the acute care unit under intensivist MD, Dr. Trujillo, and the case will be discussed with her this morning.  Recommendations and/or modifications to the treatment plan are ultimately at her discretion.      Treatment plan    *Sepsis  -Likely urinary source  -Urinalysis turbid with moderate blood and 3+ bacteria and 3+ leukocytes  -adjusted sepsis fluid LR bolus provided in ED 1,857 ml  -Antibiotic coverage with cefepime  -CT abdomen pelvis without pending  -Urine culture pending  -Blood cultures pending    *Acute renal failure  -stage III kidney disease baseline Cr. appears to be around 1.5 todays 2.18  -sepsis uti likely contributing   -CT abd/pel w/o pending to evaluate for possible obstructive component   -IV hydration monitor output and lytes   -daily bmp    *AMS  -likely related to urinary tract infection  -Ct head pending      VTE Prophylaxis:    Pharmacologic & mechanical VTE prophylaxis orders are present.        Total critical care time: Approximately 45 minutes    Due to a high probability of clinically significant, life threatening deterioration, the patient required my highest level of preparedness to intervene emergently and I personally spent this critical care time directly and personally managing the patient.     This critical care time included obtaining a history; examining the patient; pulse oximetry; ordering and review of studies; arranging urgent treatment with development of a management plan; evaluation of patient's response to treatment; frequent reassessment; and, discussions with other providers.    This critical care time was performed to assess and manage the high probability of imminent, life-threatening deterioration that could result in multi-organ failure. It was exclusive of separately billable procedures and treating other patients and teaching time.    Please see MDM section and the rest of the note for further information on patient  "assessment and treatment.    Part of this note may be an electronic transcription/translation of spoken language to printed text using the Dragon Dictation System.    Electronically signed by MORGAN Pulido on 10/5/2024 at 04:37 CDT    Electronically signed by Zeinab Trujillo MD at 10/05/24 1026          Physician Progress Notes (last 24 hours)        Abdirahman Schroeder MD at 10/14/24 1250              [facility] Services  INPATIENT PROGRESS NOTE    Patient Name: Jennifer Gomes  Date of Admission: 10/5/2024  Today's Date: 10/14/24  Length of Stay: 9  Primary Care Physician: Provider, No Known    Subjective   Chief Complaint: Weakness  HPI   Patient stated today that she has concerns about being able to go home.  She indicated her weakness is more significant than she realized; \"I can't do diddly squat.\"  She says she has been to [facility] for rehab in the past, and is agreeable to this disposition plan if it is available.      Review of Systems   All pertinent negatives and positives are as above. All other systems have been reviewed and are negative unless otherwise stated.     Objective    Temp:  [97.6 °F (36.4 °C)-98.2 °F (36.8 °C)] 98.2 °F (36.8 °C)  Heart Rate:  [73-86] 73  Resp:  [16-18] 18  BP: (140-174)/(55-67) 140/58  Physical Exam  Vitals reviewed.   Constitutional:       General: She is not in acute distress.  HENT:      Head: Normocephalic.      Mouth/Throat:      Mouth: Mucous membranes are moist.   Pulmonary:      Effort: Pulmonary effort is normal. No respiratory distress.      Comments: On room air  Genitourinary:     Comments: Smith in place  Musculoskeletal:      Comments: SCDs in place   Neurological:      General: No focal deficit present.      Mental Status: She is alert.      Motor: Weakness present.   Psychiatric:         Mood and Affect: Mood normal.         Results Review:  I have reviewed the labs, radiology results, and diagnostic " studies.    Laboratory Data:   Results from last 7 days   Lab Units 10/09/24  0439 10/08/24  0216   WBC 10*3/mm3 7.93 10.92*   HEMOGLOBIN g/dL 7.6* 7.8*   HEMATOCRIT % 24.5* 26.3*   PLATELETS 10*3/mm3 155 125*        Results from last 7 days   Lab Units 10/12/24  0244 10/11/24  0523 10/09/24  0439   SODIUM mmol/L 140 139 138   POTASSIUM mmol/L 3.7 3.4* 3.9   CHLORIDE mmol/L 105 106 106   CO2 mmol/L 24.0 25.0 21.0*   BUN mg/dL 30* 36* 44*   CREATININE mg/dL 1.49* 1.58* 1.80*   CALCIUM mg/dL 9.3 9.2 9.1   GLUCOSE mg/dL 138* 127* 124*       Culture Data:   Blood Culture   Date Value Ref Range Status   10/07/2024 No growth at 4 days  Preliminary   10/07/2024 No growth at 4 days  Preliminary       Radiology Data:   Imaging Results (Last 24 Hours)       ** No results found for the last 24 hours. **            I have reviewed the patient's current medications.     Assessment/Plan   Assessment  Active Hospital Problems    Diagnosis     **Sepsis due to urinary tract infection     Acute urinary retention     Cardiopulmonary arrest     E coli bacteremia     Anemia requiring transfusions     Bilateral hydronephrosis     Chronic constipation     Anemia     Acute renal failure superimposed on stage 3 chronic kidney disease        Treatment Plan  Day 10 of 14 antibiotics today (transitioned 10/12 from IV to PO Abxs with Omnicef). Both organisms (E. coli and Morganella) are susceptible to ceftriaxone.    Continue Smith  Will need outpatient renal US prior to in-office voiding trial; Urology recommends 1 week outpatient follow-up  Bowel regimen now on hold  BP trend reviewed; currently on Coreg and Norvasc recently added.  May titrate BP med(s) pending trend  Continue PT and OT  OOB to chair; mobilize  Repeat labs in AM to confirm stability  Dispo: our initial plan was to go home today with her daughter and HH services, even though we have been advocating for her to consider SNF.  Today, she realizes that going home given her  current status from a physical/deconditioning/stamina/weakness perspective is not the best decision, and she is agreeable to us looking at placement at [facility] for rehab.      Medical Decision Making  Number and Complexity of problems: Moderate complexity      Conditions and Status        Condition is improving.     [facility] Data  External documents reviewed: none  Cardiac tracing (EKG, telemetry) interpretation: no new EKGs  Radiology interpretation: no new radiology studies  Labs reviewed: no new labs  Any tests that were considered but not ordered: none     Decision rules/scores evaluated (example [id number], Wells, etc): none     Discussed with: patient and bedside nurse Pam     Care Planning  Shared decision making: Discussed with patient with agreement to proceed with treatment plan as outlined  Code status and discussions: Full code    Disposition  Social Determinants of Health that impact treatment or disposition: none apparent at this time  Patient now wanting us to look at rehab placement at [facility] SNF    Electronically signed by Abdirahman Schroeder MD, 10/14/24, 12:50 CDT.     Electronically signed by Abdirahman Schroeder MD at 10/14/24 1255          Physical Therapy Notes (last 48 hours)        Faith Thompson PTA at 10/12/24 1430  Version 1 of 1         Goal Outcome Evaluation:  Plan of Care Reviewed With: patient        Progress: no change  Outcome Evaluation: PT tx completed. Pt in bed and agreeable to therapy on arrival. Once PTA pulled back her blankets pt found to be visible soiled. Pt reports she was unaware she had an BM. Pt required full bed change. Rolled L and R with mod x1 several times for hygiene care, nsg assisted. Pt then too fatigued to perform further activity. Will cont to follow.                               Electronically signed by Faith Thompson PTA at 10/12/24 1430       Faith Thompson PTA at 10/12/24 1430  Version 1 of 1         Acute Care - Physical Therapy Treatment  Note  [facility]     Patient Name: Jennifer Gomes  : 1940  MRN: [id number]  Today's Date: 10/12/2024      Visit Dx:     ICD-10-CM ICD-9-CM   1. Sepsis without acute organ dysfunction, due to unspecified organism  A41.9 038.9     995.91   2. Acute cystitis without hematuria  N30.00 595.0   3. Gastroenteritis  K52.9 558.9   4. Acute metabolic encephalopathy  G93.41 348.31   5. Acute respiratory failure with hypoxia  J96.01 518.81   6. Dysphagia, unspecified type [R13.10]  R13.10 787.20   7. Impaired mobility [Z74.09]  Z74.09 799.89     Patient Active Problem List   Diagnosis    Gastroesophageal reflux disease    Spinal stenosis, lumbar region, without neurogenic claudication    Erosion of vaginal mesh    Adenocarcinoma of endometrium    S/P hysterectomy with oophorectomy    Encounter for follow-up surveillance of endometrial cancer    History of radiation therapy    Non-traumatic rhabdomyolysis    Acute renal failure superimposed on stage 3 chronic kidney disease    Medical non-compliance, does not take narcotics as prescribed    Chronic constipation    Chronic pain syndrome    Chronic prescription opiate use    Anemia    Hypothyroidism (acquired)    Essential hypertension    Venous insufficiency of both lower extremities    Chronic intractable headache    RAFAL (obstructive sleep apnea)    Acute encephalopathy due to UTI    Toxic metabolic encephalopathy    Polypharmacy    Frequent falls    Grade III internal hemorrhoids    External hemorrhoids    Colitis    Moderate malnutrition    Transaminitis    Acute UTI (urinary tract infection)    Polypharmacy    UTI (urinary tract infection)    Dementia    Hypokalemia    Sepsis due to urinary tract infection    Cardiopulmonary arrest    E coli bacteremia    Anemia requiring transfusions    Bilateral hydronephrosis    Acute urinary retention     Past Medical History:   Diagnosis Date    Anxiety     Arthritis     Bronchitis     Cancer     uterine    Chronic pain      Depression     Disease of thyroid gland     Fibromyalgia     GERD (gastroesophageal reflux disease)     Headache     History of transfusion     AS     Hyperlipidemia     Hypertension     Incontinence     Insomnia     Leg pain     Lumbar stenosis     Migraines     Peptic ulcer     Restless legs     Sleep apnea     NO C-PAP    UTI (urinary tract infection)     Vaginal bleeding      Past Surgical History:   Procedure Laterality Date    BLADDER REPAIR      MESH HAD TO BE REMOVED IN 2013    BREAST BIOPSY Right 2017    benign    BREAST CYST EXCISION Left 1982    CARDIAC CATHETERIZATION      CARPAL TUNNEL RELEASE      CATARACT EXTRACTION W/ INTRAOCULAR LENS  IMPLANT, BILATERAL      CHOLECYSTECTOMY WITH INTRAOPERATIVE CHOLANGIOGRAM N/A 2023    Procedure: CHOLECYSTECTOMY LAPAROSCOPIC INTRAOPERATIVE CHOLANGIOGRAM;  Surgeon: Gerardo Arciniega MD;  Location: [facility] OR;  Service: General;  Laterality: N/A;    COLONOSCOPY      COLONOSCOPY N/A 10/01/2021    Procedure: COLONOSCOPY WITH ANESTHESIA;  Surgeon: Tom Velasco DO;  Location: [facility] ENDOSCOPY;  Service: Gastroenterology;  Laterality: N/A;  pre: change in bowel habits  post: diverticulosis. hemorrhoids.   Olivia Mora APRN        CYSTECTOMY      D & C HYSTEROSCOPY N/A 2017    Procedure: DILATATION AND CURETTAGE HYSTEROSCOPY;  Surgeon: Shasta Madrigal MD;  Location: [facility] OR;  Service:     DILATION AND CURETTAGE, DIAGNOSTIC / THERAPEUTIC      ENDOSCOPY  2010    Short segment of Arriola's,Moderate chroninc esophagogastritis and negative H.pylori    ENDOSCOPY N/A 2017    Procedure: ESOPHAGOGASTRODUODENOSCOPY WITH ANESTHESIA;  Surgeon: Tom Velasco DO;  Location: [facility] ENDOSCOPY;  Service:     EYE SURGERY      RETINA    HEMORRHOIDECTOMY SIGMOIDOSCOPY N/A 3/21/2023    Procedure: HEMORRHOIDECTOMY WITH EXAM UNDER ANESTHESIA;  Surgeon: Holly Chavez MD;  Location: [facility] OR;  Service: General;  Laterality: N/A;     HYSTERECTOMY  12/20/2017    ORIF TIBIA/FIBULA FRACTURES Left 2000    TRANSVAGINAL TAPING SUSPENSION N/A 11/06/2017    Procedure: VAGINAL MESH REVISION;  Surgeon: Shasta Madrigal MD;  Location: [facility] OR;  Service:     VAGINAL MESH REVISION  2013     PT Assessment (Last 12 Hours)       PT Evaluation and Treatment       Row Name 10/12/24 1128          Physical Therapy Time and Intention    Subjective Information complains of;weakness;fatigue;pain  -LY     Document Type therapy note (daily note)  -LY     Mode of Treatment physical therapy  -LY       Row Name 10/12/24 1128          General Information    Existing Precautions/Restrictions fall  -LY       Row Name 10/12/24 1128          Pain    Pretreatment Pain Rating 8/10  -LY     Posttreatment Pain Rating 8/10  -LY     Pain Side/Orientation generalized  -LY     Pain Intervention(s) Medication (See MAR);Repositioned  -LY       Row Name 10/12/24 1128          Bed Mobility    Bed Mobility rolling left;rolling right  -LY     Rolling Left Sullivan (Bed Mobility) verbal cues;moderate assist (50% patient effort)  -LY     Rolling Right Sullivan (Bed Mobility) verbal cues;moderate assist (50% patient effort)  -LY     Assistive Device (Bed Mobility) bed rails  -LY     Comment, (Bed Mobility) rolled several times for hygiene care and linen change  -LY       Row Name 10/12/24 1128          Transfers    Comment, (Transfers) deferred d/t fatigue post bed mobility  -LY       Row Name 10/12/24 1128          Plan of Care Review    Plan of Care Reviewed With patient  -LY     Progress no change  -LY     Outcome Evaluation PT tx completed. Pt in bed and agreeable to therapy on arrival. Once PTA pulled back her blankets pt found to be visible soiled. Pt reports she was unaware she had an BM. Pt required full bed change. Rolled L and R with mod x1 several times for hygiene care, nsg assisted. Pt then too fatigued to perform further activity. Will cont to follow.  -LY       Row Name  10/12/24 1128          Positioning and Restraints    Pre-Treatment Position in bed  -LY     Post Treatment Position bed  -LY     In Bed fowlers;call light within reach;encouraged to call for assist;with nsg  -LY               User Key  (r) = Recorded By, (t) = Taken By, (c) = Cosigned By      Initials Name Provider Type    LY Faith Thompson, PTA Physical Therapist Assistant                    Physical Therapy Education       Title: PT OT SLP Therapies (In Progress)       Topic: Physical Therapy (In Progress)       Point: Mobility training (In Progress)       Learning Progress Summary            Patient Acceptance, E, NR by  at 10/7/2024 1158    Comment: role of PT, call for assist, d/c planning, high fall risk                      Point: Home exercise program (In Progress)       Learning Progress Summary            Patient Acceptance, E, NR by  at 10/7/2024 1158    Comment: role of PT, call for assist, d/c planning, high fall risk                      Point: Body mechanics (In Progress)       Learning Progress Summary            Patient Acceptance, E, NR by  at 10/7/2024 1158    Comment: role of PT, call for assist, d/c planning, high fall risk                      Point: Precautions (In Progress)       Learning Progress Summary            Patient Acceptance, E, NR by  at 10/7/2024 1158    Comment: role of PT, call for assist, d/c planning, high fall risk                                      User Key       Initials Effective Dates Name Provider Type [facility] 08/15/24 -  Sunny Esquivel, HUNTER DPT Physical Therapist PT                  PT Recommendation and Plan     Plan of Care Reviewed With: patient  Progress: no change  Outcome Evaluation: PT tx completed. Pt in bed and agreeable to therapy on arrival. Once PTA pulled back her blankets pt found to be visible soiled. Pt reports she was unaware she had an BM. Pt required full bed change. Rolled L and R with mod x1 several times for hygiene care, nsg  assisted. Pt then too fatigued to perform further activity. Will cont to follow.       Time Calculation:    PT Charges       Row Name 10/12/24 1428             Time Calculation    Start Time 1128  -LY      Stop Time 1158  -LY      Time Calculation (min) 30 min  -LY      PT Received On 10/12/24  -LY         Time Calculation- PT    Total Timed Code Minutes- PT 30 minute(s)  -LY         Timed Charges    80766 - PT Therapeutic Activity Minutes 30  -LY         Total Minutes    Timed Charges Total Minutes 30  -LY       Total Minutes 30  -LY                User Key  (r) = Recorded By, (t) = Taken By, (c) = Cosigned By      Initials Name Provider Type    LY Faith Thompson PTA Physical Therapist Assistant                  Therapy Charges for Today       Code Description Service Date Service Provider Modifiers Qty    89698050407 HC PT THERAPEUTIC ACT EA 15 MIN 10/12/2024 Faith Thompson PTA GP 2            PT G-Codes  Outcome Measure Options: AM-PAC 6 Clicks Basic Mobility (PT)  AM-PAC 6 Clicks Score (PT): 9  AM-PAC 6 Clicks Score (OT): 12    Faith Thompson PTA  10/12/2024      Electronically signed by Faith Thompson PTA at 10/12/24 1430       Faith Thompson PTA at 10/14/24 1040  Version 1 of 1         Acute Care - Physical Therapy Treatment Note  [facility]     Patient Name: Jennifer Gomes  : 1940  MRN: [id number]  Today's Date: 10/14/2024      Visit Dx:     ICD-10-CM ICD-9-CM   1. Sepsis without acute organ dysfunction, due to unspecified organism  A41.9 038.9     995.91   2. Acute cystitis without hematuria  N30.00 595.0   3. Gastroenteritis  K52.9 558.9   4. Acute metabolic encephalopathy  G93.41 348.31   5. Acute respiratory failure with hypoxia  J96.01 518.81   6. Dysphagia, unspecified type [R13.10]  R13.10 787.20   7. Impaired mobility [Z74.09]  Z74.09 799.89     Patient Active Problem List   Diagnosis    Gastroesophageal reflux disease    Spinal stenosis, lumbar region, without neurogenic claudication     Erosion of vaginal mesh    Adenocarcinoma of endometrium    S/P hysterectomy with oophorectomy    Encounter for follow-up surveillance of endometrial cancer    History of radiation therapy    Non-traumatic rhabdomyolysis    Acute renal failure superimposed on stage 3 chronic kidney disease    Medical non-compliance, does not take narcotics as prescribed    Chronic constipation    Chronic pain syndrome    Chronic prescription opiate use    Anemia    Hypothyroidism (acquired)    Essential hypertension    Venous insufficiency of both lower extremities    Chronic intractable headache    RAFAL (obstructive sleep apnea)    Acute encephalopathy due to UTI    Toxic metabolic encephalopathy    Polypharmacy    Frequent falls    Grade III internal hemorrhoids    External hemorrhoids    Colitis    Moderate malnutrition    Transaminitis    Acute UTI (urinary tract infection)    Polypharmacy    UTI (urinary tract infection)    Dementia    Hypokalemia    Sepsis due to urinary tract infection    Cardiopulmonary arrest    E coli bacteremia    Anemia requiring transfusions    Bilateral hydronephrosis    Acute urinary retention     Past Medical History:   Diagnosis Date    Anxiety     Arthritis     Bronchitis     Cancer     uterine    Chronic pain     Depression     Disease of thyroid gland     Fibromyalgia     GERD (gastroesophageal reflux disease)     Headache     History of transfusion     AS     Hyperlipidemia     Hypertension     Incontinence     Insomnia     Leg pain     Lumbar stenosis     Migraines     Peptic ulcer     Restless legs     Sleep apnea     NO C-PAP    UTI (urinary tract infection)     Vaginal bleeding      Past Surgical History:   Procedure Laterality Date    BLADDER REPAIR      MESH HAD TO BE REMOVED IN 2013    BREAST BIOPSY Right 2017    benign    BREAST CYST EXCISION Left     CARDIAC CATHETERIZATION      CARPAL TUNNEL RELEASE      CATARACT EXTRACTION W/ INTRAOCULAR LENS  IMPLANT, BILATERAL       CHOLECYSTECTOMY WITH INTRAOPERATIVE CHOLANGIOGRAM N/A 9/7/2023    Procedure: CHOLECYSTECTOMY LAPAROSCOPIC INTRAOPERATIVE CHOLANGIOGRAM;  Surgeon: Gerardo Arciniega MD;  Location:  [facility] OR;  Service: General;  Laterality: N/A;    COLONOSCOPY      COLONOSCOPY N/A 10/01/2021    Procedure: COLONOSCOPY WITH ANESTHESIA;  Surgeon: Tom Velasco DO;  Location:  [facility] ENDOSCOPY;  Service: Gastroenterology;  Laterality: N/A;  pre: change in bowel habits  post: diverticulosis. hemorrhoids.   Olivia Mora APRN        CYSTECTOMY      D & C HYSTEROSCOPY N/A 11/06/2017    Procedure: DILATATION AND CURETTAGE HYSTEROSCOPY;  Surgeon: Shasta Madrigal MD;  Location: [facility] OR;  Service:     DILATION AND CURETTAGE, DIAGNOSTIC / THERAPEUTIC  2008    ENDOSCOPY  09/23/2010    Short segment of Arriola's,Moderate chroninc esophagogastritis and negative H.pylori    ENDOSCOPY N/A 09/25/2017    Procedure: ESOPHAGOGASTRODUODENOSCOPY WITH ANESTHESIA;  Surgeon: Tom Velasco DO;  Location: [facility] ENDOSCOPY;  Service:     EYE SURGERY      RETINA    HEMORRHOIDECTOMY SIGMOIDOSCOPY N/A 3/21/2023    Procedure: HEMORRHOIDECTOMY WITH EXAM UNDER ANESTHESIA;  Surgeon: Holly Chavez MD;  Location: [facility] OR;  Service: General;  Laterality: N/A;    HYSTERECTOMY  12/20/2017    ORIF TIBIA/FIBULA FRACTURES Left 2000    TRANSVAGINAL TAPING SUSPENSION N/A 11/06/2017    Procedure: VAGINAL MESH REVISION;  Surgeon: Shasta Madrigal MD;  Location:  [facility] OR;  Service:     VAGINAL MESH REVISION  2013     PT Assessment (Last 12 Hours)       PT Evaluation and Treatment       Row Name 10/14/24 0937          Physical Therapy Time and Intention    Subjective Information complains of;pain;weakness  -LY     Document Type therapy note (daily note)  -LY     Mode of Treatment physical therapy  -LY       Row Name 10/14/24 0937          General Information    Existing Precautions/Restrictions fall  -LY       Row Name 10/14/24 0937          Pain     Pretreatment Pain Rating 6/10  -LY     Posttreatment Pain Rating 6/10  -LY     Pain Location extremity  -LY     Pain Intervention(s) Medication (See MAR);Ambulation/increased activity  -LY       Row Name 10/14/24 0937          Motor Skills    Comments, Therapeutic Exercise BLE AROM seated  -LY     Additional Documentation Comments, Therapeutic Exercise (Row)  -LY       Row Name 10/14/24 0937          Plan of Care Review    Plan of Care Reviewed With patient  -LY     Progress improving  -LY       Row Name 10/14/24 0937          Positioning and Restraints    Pre-Treatment Position in bed  -LY     Post Treatment Position bed  -LY     In Bed side lying right;call light within reach;encouraged to call for assist;exit alarm on  -LY               User Key  (r) = Recorded By, (t) = Taken By, (c) = Cosigned By      Initials Name Provider Type    LY Faith Thompson, PTA Physical Therapist Assistant                    Physical Therapy Education       Title: PT OT SLP Therapies (In Progress)       Topic: Physical Therapy (In Progress)       Point: Mobility training (In Progress)       Learning Progress Summary            Patient Acceptance, E, NR by AJ at 10/7/2024 1158    Comment: role of PT, call for assist, d/c planning, high fall risk                      Point: Home exercise program (In Progress)       Learning Progress Summary            Patient Acceptance, E, NR by AJ at 10/7/2024 1158    Comment: role of PT, call for assist, d/c planning, high fall risk                      Point: Body mechanics (In Progress)       Learning Progress Summary            Patient Acceptance, E, NR by AJ at 10/7/2024 1158    Comment: role of PT, call for assist, d/c planning, high fall risk                      Point: Precautions (In Progress)       Learning Progress Summary            Patient Acceptance, E, NR by AJ at 10/7/2024 1158    Comment: role of PT, call for assist, d/c planning, high fall risk                                       User Key       Initials Effective Dates Name Provider Type Discipline    AJ 08/15/24 -  Sunny Esquivel, PT DPT Physical Therapist PT                  PT Recommendation and Plan  Anticipated Discharge Disposition (PT): skilled nursing facility  Plan of Care Reviewed With: patient  Progress: improving  Outcome Evaluation: PT tx completed. Pt in bed and agreeable to therapy on arrival. Once PTA pulled back her blankets pt found to be visible soiled. Pt reports she was unaware she had an BM. Pt required full bed change. Rolled L and R with mod x1 several times for hygiene care, nsg assisted. Pt then too fatigued to perform further activity. Will cont to follow.       Time Calculation:    PT Charges       Row Name 10/14/24 1039             Time Calculation    Start Time 0937  -LY      Stop Time 1000  -LY      Time Calculation (min) 23 min  -LY      PT Received On 10/14/24  -LY         Time Calculation- PT    Total Timed Code Minutes- PT 23 minute(s)  -LY         Timed Charges    19354 - PT Therapeutic Exercise Minutes 10  -LY      83782 - PT Therapeutic Activity Minutes 13  -LY         Total Minutes    Timed Charges Total Minutes 23  -LY       Total Minutes 23  -LY                User Key  (r) = Recorded By, (t) = Taken By, (c) = Cosigned By      Initials Name Provider Type    LY Faith Thompson PTA Physical Therapist Assistant                  Therapy Charges for Today       Code Description Service Date Service Provider Modifiers Qty    60798284574 HC PT THER PROC EA 15 MIN 10/14/2024 Faith Thompson PTA GP 1    04390764541 HC PT THERAPEUTIC ACT EA 15 MIN 10/14/2024 Faith Thompson PTA GP 1            PT G-Codes  Outcome Measure Options: AM-PAC 6 Clicks Basic Mobility (PT)  AM-PAC 6 Clicks Score (PT): 9  AM-PAC 6 Clicks Score (OT): 12    Faith Thompson PTA  10/14/2024      Electronically signed by Faith Thompson PTA at 10/14/24 1040       Occupational Therapy Notes (last 48 hours)  Notes from 10/12/24 1411 through  10/14/24 1411   No notes exist for this encounter.

## 2024-10-14 NOTE — CASE MANAGEMENT/SOCIAL WORK
Continued Stay Note  [facility]     Patient Name: Jennifer Gomes  MRN: [id number]  Today's Date: 10/14/2024    Admit Date: 10/5/2024    Plan: [facility]   Discharge Plan       Row Name 10/14/24 1521       Plan    Plan [facility]    Patient/Family in Agreement with Plan yes    Plan Comments Pt has ins. approval to be discharged to [facility] when medically ready, informed Dr. Schroeder. Will follow.      Row Name 10/14/24 1330       Plan    Plan [facility]- upon ins. precert approval    Patient/Family in Agreement with Plan yes    Plan Comments [facility] has offered pt a bed there and she accepted. Lisa Burnette- Director of  is starting ins. precert request. Will follow.      Row Name 10/14/24 1300       Plan    Plan Referral to [facility]    Patient/Family in Agreement with Plan yes    Plan Comments Pt wanting to be listed at [facility], informed Rachelle there of referral. Will follow for decision.                   Discharge Codes    No documentation.                 Expected Discharge Date and Time       Expected Discharge Date Expected Discharge Time    Oct 14, 2024               SARAH ArandaW

## 2024-10-14 NOTE — PROGRESS NOTES
"            [facility] Palliative Care Services    Palliative Care Daily Progress Note   Chief complaint-follow up support for patient/family    Code Status:   Code Status and Medical Interventions: CPR (Attempt to Resuscitate); Full Support   Ordered at: 10/07/24 1320     Code Status (Patient has no pulse and is not breathing):    CPR (Attempt to Resuscitate)     Medical Interventions (Patient has pulse or is breathing):    Full Support      Advanced Directives: Advance Directive Status: Patient does not have advance directive   Goals of Care: Ongoing.     S: Medical record reviewed. Events noted.  Urology recommendations a Smith catheter replaced and plans for repeat imaging with Smith in place.  Discharged with Smith.  Urology to follow-up outpatient.   Required 45 mg oral morphine equivalent in the form of oxycodone over the last 24 hours.  Bowel and bladder incontinence.  Last BM 10/12. Laying in bed without apparent distress or complaint.  Request for Chapstick provided. Anticipating SNF at discharge, awaiting pre-CERT.  \"I am not quite ready for home yet\".     Review of Systems   Constitutional: Positive for malaise/fatigue.   Respiratory:  Negative for shortness of breath.    Musculoskeletal:  Positive for myalgias.   Gastrointestinal:  Positive for bowel incontinence.   Genitourinary:  Positive for bladder incontinence and incomplete emptying.   Neurological:  Positive for weakness.     Pain Assessment  Preferred Pain Scale: number (Numeric Rating Pain Scale)  Nonverbal Indicators of Pain: agitated, restless, moaning  CPOT Facial Expression: 0-->relaxed, neutral  CPOT Body Movements: 0-->absence of movements  CPOT Muscle Tension: 0-->relaxed  Ventilator Compliance/Vocalization: 0-->talking in normal tone or no sound  CPOT Score: 0  Pain Location: (P) other (see comments) (Genralized)  Pain Description: aching    O:     Intake/Output Summary (Last 24 hours) at 10/14/2024 1402  Last data filed at " 10/14/2024 0930  Gross per 24 hour   Intake 120 ml   Output 1625 ml   Net -1505 ml       Diagnostics and current medications: Reviewed.      Current Facility-Administered Medications:     acetaminophen (TYLENOL) tablet 650 mg, 650 mg, Oral, Q4H PRN, 650 mg at 10/07/24 1056 **OR** acetaminophen (TYLENOL) 160 MG/5ML oral solution 650 mg, 650 mg, Oral, Q4H PRN, 650 mg at 10/06/24 1232 **OR** acetaminophen (TYLENOL) suppository 650 mg, 650 mg, Rectal, Q4H PRN, Cuba Espitia MD    amLODIPine (NORVASC) tablet 5 mg, 5 mg, Oral, Q24H, Abdirahman Schroeder MD, 5 mg at 10/14/24 0853    atorvastatin (LIPITOR) tablet 40 mg, 40 mg, Oral, Daily, Quentin Abernathy DO, 40 mg at 10/14/24 0853    [Held by provider] sennosides-docusate (PERICOLACE) 8.6-50 MG per tablet 2 tablet, 2 tablet, Oral, BID, 2 tablet at 10/12/24 0843 **AND** bisacodyl (DULCOLAX) EC tablet 5 mg, 5 mg, Oral, Daily PRN **AND** bisacodyl (DULCOLAX) suppository 10 mg, 10 mg, Rectal, Daily PRN, Lonnie Murphy DO    carvedilol (COREG) tablet 12.5 mg, 12.5 mg, Oral, Q12H, Zeinab Trujillo MD, 12.5 mg at 10/14/24 0853    cefdinir (OMNICEF) capsule 300 mg, 300 mg, Oral, Q24H, Abdirahman Schroeder MD, 300 mg at 10/14/24 0853    cholecalciferol (VITAMIN D3) tablet 800 Units, 800 Units, Oral, Daily, Quentin Abernathy DO, 800 Units at 10/14/24 0854    donepezil (ARICEPT) tablet 10 mg, 10 mg, Oral, Nightly, Quentin Abernathy DO, 10 mg at 10/13/24 2112    heparin (porcine) 5000 UNIT/ML injection 5,000 Units, 5,000 Units, Subcutaneous, Q8H, Quentin Abernathy DO, 5,000 Units at 10/14/24 1350    levothyroxine (SYNTHROID, LEVOTHROID) tablet 50 mcg, 50 mcg, Oral, Q AM, Quentin Abernathy DO, 50 mcg at 10/14/24 0625    melatonin tablet 10 mg, 10 mg, Oral, Nightly PRN, Han Gibbs APRN, 10 mg at 10/10/24 2126    oxyCODONE (ROXICODONE) immediate release tablet 10 mg, 10 mg, Oral, Q4H PRN, Abdirahman Schroeder MD, 10 mg  "at 10/14/24 1221    pantoprazole (PROTONIX) EC tablet 40 mg, 40 mg, Oral, Q AM, Quentin Abernathy DO, 40 mg at 10/14/24 0625    [Held by provider] polyethylene glycol (MIRALAX) packet 17 g, 17 g, Oral, Daily, Abdirahman Schroeder MD    sertraline (ZOLOFT) tablet 25 mg, 25 mg, Oral, Daily, Quentin Abernathy DO, 25 mg at 10/14/24 0854    sodium chloride 0.9 % flush 10 mL, 10 mL, Intravenous, Q12H, Cuba Espitia MD, 10 mL at 10/12/24 0845    sodium chloride 0.9 % flush 10 mL, 10 mL, Intravenous, PRN, Cuba sEpitia MD    Allergies   Allergen Reactions    Ropinirole Hcl Shortness Of Breath    Codeine Itching and Mental Status Change    Definity [Perflutren Lipid Microsphere] Other (See Comments)     Severe back pain    Ambien [Zolpidem] Other (See Comments)     HYPER     Eszopiclone Other (See Comments)     MAKES PT HYPER     Pregabalin Dizziness    Tizanidine Other (See Comments)     Terrible nightmares     I have utilized all available immediate resources to obtain, update, or review the patient's current medications (including all prescriptions, over-the-counter products, herbals, cannabis/cannabidiol products, and vitamin/mineral/dietary (nutritional) supplements) for name, route of administration, type, dose and frequency.    A:    /58 (BP Location: Left arm, Patient Position: Lying)   Pulse 73   Temp 98.2 °F (36.8 °C) (Oral)   Resp 18   Ht 157.5 cm (62\")   Wt 85.9 kg (189 lb 4.8 oz)   LMP  (LMP Unknown)   SpO2 94%   BMI 34.62 kg/m²     Vitals and nursing note reviewed.   Constitutional:       Appearance: Not in distress. Ill-appearing and chronically ill-appearing.   Eyes:      General: Lids are normal.   HENT:      Head: Normocephalic.   Pulmonary:      Effort: Pulmonary effort is normal.   Cardiovascular:      Normal rate.   Edema:     Peripheral edema absent.   Musculoskeletal: Normal range of motion.      Cervical back: Neck supple.   Skin:     General: Skin is warm. "   Genitourinary:     Comments: Smith catheter in place  Neurological:      Mental Status: Arouses easily  Psychiatric:         Mood and Affect: appears calm     Patient status: Disease state: Controlled with current treatments.  Current Functional status: Palliative Performance Scale Score: Performance 30% based on the following measures: Ambulation: Totally bed bound, Activity and Evidence of Disease: Unable to do any work, extensive evidence of disease, Self-Care: Total care required,  Intake: Reduced, LOC: Full, drowsy or confusion   Baseline Functional status: Palliative Performance Scale Score: Performance 70% based on the following measures: Ambulation: Reduced, Activity and Evidence of Disease: Unable to do normal work, some evidence of disease, Self-Care: Fully independent,  Intake: Normal or reduced, LOC: Full   Baseline ECOG Status(2) Ambulatory and capable of self care, unable to carry out work activity, up and about > 50% or waking hours.  Nutritional status: Albumin 2.3 Body mass index is 32.08 kg/m².      Hospital Problem List      Sepsis    Sepsis due to urinary tract infection     Impression/Problem List:     Cardiopulmonary arrest  Alzheimer's dementia  Sepsis due to bacteremia-E. 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 Garcia  Depression  Hypothyroidism  Fibromyalgia  GERD  Hyperlipidemia  Hypertension  Incontinence  Restless leg syndrome  Obstructive sleep apnea-not using CPAP  Lumbar stenosis  Migraines  Peptic ulcer   Advanced age     Recommendations/Plan:  1. plan: Goals of care include CODE STATUS CPR/full support interventions.     Family support: The patient receives support from her  and daughter..  Advance Directives: Not on file     POA/Healthcare surrogate-spouse and daughter-next of  kin.     2.  Palliative care encounter  - Prognosis is guarded long-term secondary to cardiopulmonary arrest, Alzheimer's dementia, sepsis, acute kidney injury, adenocarcinoma of endometrium, multiple comorbidities, and advanced age.  -Patient and family appears to have fair prognostic awareness.      -Apparently patient admitted as DNR/DO NOT INTUBATE.  Transferred to CT and experience cardiopulmonary arrest and a CODE BLUE was called and received CPR x 5-10 minutes per report.  Placed in code chill protocol at 0501.  Admitted to critical care unit on ventilator support with sepsis.  Started on IV fluids and IV antibiotics.  Hypothermia protocol discontinued.  Acute kidney injury felt likely multifactorial.    10/6-unit of PRBC 10/6.    -Extubated to nasal cannula.    -Evaluated by speech therapy due to mild oral dysphagia and started on thin liquid soft to chew solid with chopped meat diet.    -Therapy recommends SNF.   -Anticipating hospice at discharge per intensivist.   10/7-Urology consulted due to bilateral hydronephrosis and UTI, recommends following to determine bladder emptying, bowel management, and broad-spectrum antibiotics x 14 days total for complicated UTI.    10/8-Due to postvoid residuals greater than 200 a Smith catheter has been replaced today and plans for repeat CT in 48 hours.  10/10-Repeat CT shows persistent and unchanged bilateral moderate hydronephrosis and hydroureter, and moderately distended urinary bladder. Smith catheter to be placed.      10/7-clarified CODE STATUS CPR/full support interventions    10/14-continue supportive measures  -Urology following, plan to discharge with Smith catheter.   -Anticipating SNF.   -At risk for rehospitalization's and decline given multiple comorbid factors and cardiopulmonary arrest event      Thank you for allowing us to participate in patient's plan of care. Palliative Care Team will continue to follow patient.     Joana Rush,  APRN  10/14/2024  14:02 CDT

## 2024-10-15 ENCOUNTER — TELEPHONE (OUTPATIENT)
Dept: UROLOGY | Facility: CLINIC | Age: 84
End: 2024-10-15
Payer: MEDICARE

## 2024-10-15 VITALS
DIASTOLIC BLOOD PRESSURE: 59 MMHG | TEMPERATURE: 98.3 F | RESPIRATION RATE: 18 BRPM | HEART RATE: 77 BPM | BODY MASS INDEX: 34.83 KG/M2 | OXYGEN SATURATION: 93 % | WEIGHT: 189.3 LBS | HEIGHT: 62 IN | SYSTOLIC BLOOD PRESSURE: 152 MMHG

## 2024-10-15 DIAGNOSIS — N13.30 HYDRONEPHROSIS, UNSPECIFIED HYDRONEPHROSIS TYPE: Primary | ICD-10-CM

## 2024-10-15 LAB
ANION GAP SERPL CALCULATED.3IONS-SCNC: 7 MMOL/L (ref 5–15)
BUN SERPL-MCNC: 23 MG/DL (ref 8–23)
BUN/CREAT SERPL: 18.5 (ref 7–25)
CALCIUM SPEC-SCNC: 8.9 MG/DL (ref 8.6–10.5)
CHLORIDE SERPL-SCNC: 104 MMOL/L (ref 98–107)
CO2 SERPL-SCNC: 27 MMOL/L (ref 22–29)
CREAT SERPL-MCNC: 1.24 MG/DL (ref 0.57–1)
DEPRECATED RDW RBC AUTO: 48.3 FL (ref 37–54)
EGFRCR SERPLBLD CKD-EPI 2021: 43.5 ML/MIN/1.73
ERYTHROCYTE [DISTWIDTH] IN BLOOD BY AUTOMATED COUNT: 15.2 % (ref 12.3–15.4)
GLUCOSE SERPL-MCNC: 124 MG/DL (ref 65–99)
HCT VFR BLD AUTO: 26.5 % (ref 34–46.6)
HGB BLD-MCNC: 8.2 G/DL (ref 12–15.9)
MCH RBC QN AUTO: 28.4 PG (ref 26.6–33)
MCHC RBC AUTO-ENTMCNC: 30.9 G/DL (ref 31.5–35.7)
MCV RBC AUTO: 91.7 FL (ref 79–97)
PLATELET # BLD AUTO: 203 10*3/MM3 (ref 140–450)
PMV BLD AUTO: 11.1 FL (ref 6–12)
POTASSIUM SERPL-SCNC: 3.8 MMOL/L (ref 3.5–5.2)
RBC # BLD AUTO: 2.89 10*6/MM3 (ref 3.77–5.28)
SODIUM SERPL-SCNC: 138 MMOL/L (ref 136–145)
WBC NRBC COR # BLD AUTO: 7.91 10*3/MM3 (ref 3.4–10.8)

## 2024-10-15 PROCEDURE — 80048 BASIC METABOLIC PNL TOTAL CA: CPT | Performed by: INTERNAL MEDICINE

## 2024-10-15 PROCEDURE — 85027 COMPLETE CBC AUTOMATED: CPT | Performed by: INTERNAL MEDICINE

## 2024-10-15 PROCEDURE — 25010000002 HEPARIN (PORCINE) PER 1000 UNITS: Performed by: INTERNAL MEDICINE

## 2024-10-15 RX ORDER — OXYCODONE HYDROCHLORIDE 10 MG/1
10 TABLET ORAL EVERY 6 HOURS PRN
Qty: 12 TABLET | Refills: 0 | Status: SHIPPED | OUTPATIENT
Start: 2024-10-15 | End: 2024-10-17

## 2024-10-15 RX ORDER — AMLODIPINE BESYLATE 5 MG/1
5 TABLET ORAL
Start: 2024-10-15

## 2024-10-15 RX ORDER — CEFDINIR 300 MG/1
300 CAPSULE ORAL
Qty: 3 CAPSULE | Refills: 0 | Status: SHIPPED | OUTPATIENT
Start: 2024-10-15 | End: 2024-10-18

## 2024-10-15 RX ORDER — AMOXICILLIN 250 MG
2 CAPSULE ORAL 2 TIMES DAILY
Start: 2024-10-15

## 2024-10-15 RX ORDER — POLYETHYLENE GLYCOL 3350 17 G/17G
17 POWDER, FOR SOLUTION ORAL DAILY
Start: 2024-10-15

## 2024-10-15 RX ADMIN — CHOLECALCIFEROL TAB 10 MCG (400 UNIT) 800 UNITS: 10 TAB at 08:46

## 2024-10-15 RX ADMIN — BISACODYL 10 MG: 10 SUPPOSITORY RECTAL at 08:16

## 2024-10-15 RX ADMIN — CARVEDILOL 12.5 MG: 6.25 TABLET, FILM COATED ORAL at 08:46

## 2024-10-15 RX ADMIN — SERTRALINE HYDROCHLORIDE 25 MG: 50 TABLET ORAL at 08:46

## 2024-10-15 RX ADMIN — HEPARIN SODIUM 5000 UNITS: 5000 INJECTION, SOLUTION INTRAVENOUS; SUBCUTANEOUS at 05:43

## 2024-10-15 RX ADMIN — ATORVASTATIN CALCIUM 40 MG: 40 TABLET, FILM COATED ORAL at 08:46

## 2024-10-15 RX ADMIN — PANTOPRAZOLE SODIUM 40 MG: 40 TABLET, DELAYED RELEASE ORAL at 05:43

## 2024-10-15 RX ADMIN — AMLODIPINE BESYLATE 5 MG: 5 TABLET ORAL at 08:46

## 2024-10-15 RX ADMIN — OXYCODONE HYDROCHLORIDE 10 MG: 5 TABLET ORAL at 05:54

## 2024-10-15 RX ADMIN — CEFDINIR 300 MG: 300 CAPSULE ORAL at 08:46

## 2024-10-15 RX ADMIN — OXYCODONE HYDROCHLORIDE 10 MG: 5 TABLET ORAL at 12:50

## 2024-10-15 RX ADMIN — LEVOTHYROXINE SODIUM 50 MCG: 100 TABLET ORAL at 05:43

## 2024-10-15 RX ADMIN — ACETAMINOPHEN 650 MG: 325 TABLET ORAL at 08:46

## 2024-10-15 NOTE — DISCHARGE SUMMARY
[facility] Medicine Services  DISCHARGE SUMMARY       Date of Admission: 10/5/2024  Date of Discharge:  10/15/2024  Primary Care Physician: Provider, No Known    Presenting Problem/History of Present Illness:  Altered mental status; weakness    Final Discharge Diagnoses:  Active Hospital Problems    Diagnosis     **Sepsis due to urinary tract infection     Acute urinary retention     Cardiopulmonary arrest     E coli bacteremia     Anemia requiring transfusions     Bilateral hydronephrosis     Chronic constipation     Anemia     Acute renal failure superimposed on stage 3 chronic kidney disease        Consults: Urology; Palliative Care    Pertinent Test Results:   Results for orders placed during the hospital encounter of 09/04/23    Adult Transthoracic Echo Complete W/ Cont if Necessary Per Protocol    Interpretation Summary    Left ventricular systolic function is normal. Left ventricular ejection fraction appears to be 56 - 60%.    Left ventricular wall thickness is consistent with moderate concentric hypertrophy.    Left ventricular diastolic function is consistent with (grade Ia w/high LAP) impaired relaxation.    The left atrial cavity is moderate to severely dilated.    Left atrial volume is moderately increased.    The right atrial cavity is borderline dilated.    Estimated right ventricular systolic pressure from tricuspid regurgitation is mildly elevated (35-45 mmHg).      Imaging Results (All)       Procedure Component Value Units Date/Time    CT Abdomen Pelvis Without Contrast [[id number]] Collected: 10/10/24 0807     Updated: 10/10/24 0825    Narrative:      EXAMINATION: CT ABDOMEN PELVIS WO CONTRAST-      10/10/2024 6:56 AM     HISTORY: Follow-up bilateral hydronephrosis; A41.9-Sepsis, unspecified  organism; N30.00-Acute cystitis without hematuria; K52.9-Noninfective  gastroenteritis and colitis, unspecified; G93.41-Metabolic  encephalopathy; J96.01-Acute  respiratory failure with hypoxia;  R13.10-Dysphagia, unspecified; Z74.09-Other reduced mobility     In order to have a CT radiation dose as low as reasonably achievable  Automated Exposure Control was utilized for adjustment of the mA and/or  KV according to patient size.     Total DLP = 439.22 mGy.cm     CT scan of the abdomen and pelvis should performed without intravenous  contrast enhancement.     The images are acquired in axial plane and subsequent reconstruction  with coronal and sagittal planes.     The comparison is made with the previous study dated 10/5/2024.     Lung bases included in the study show atelectatic change in the lower  lungs right more than the left. There is small bibasilar pleural  effusion.     Limited visualized cardiomediastinal structures show heavy calcification  mitral annulus. Atheromatous change of thoracic aorta. No aneurysmal  dilatation. Limited visualized coronary arteries show no significant  calcific plaques. There is moderate cardiomegaly.     The unenhanced liver and spleen are unremarkable. An isodense  abnormality or a mass may not be evaluated or excluded.     The gallbladder is surgically absent.     Unenhanced pancreas appears unremarkable.     The adrenal glands bilaterally are unremarkable.     A relatively small right kidney is seen. Normal sized left kidney. An  isodense lesion in either kidney may not be evaluated or excluded. There  is moderate bilateral perinephric fat infiltration similar to the  previous study. No radiopaque calculi. There is persistent moderate  hydronephrosis and hydroureter bilaterally. No radiopaque calculi in  either ureter. Urinary bladder is moderately distended similar to the  previous study. No intrinsic abnormality.     The stomach is decompressed with moderate symmetrical wall thickening.  No focal abnormality [place]. Duodenum is normal. Small bowel is  nondistended. No finding to suggest appendicitis. Significant large  volume  stool in the colon, more severely in the rectum which  significantly more excessive since the previous study. There is moderate  rectal wall thickening with surrounding fat infiltration.     Atheromatous change of the abdominal aorta and iliac arteries. No  aneurysmal dilatation.     There is no evidence of abdominal or pelvic lymphadenopathy.     There is small fat-containing umbilical hernia.     Images reviewed in bone window show chronic degenerative changes of the  lumbar spine with mild dextroscoliosis. There is grade 1  spondylolisthesis L4-5 without evidence of spondylolysis. Severe  degeneration the disc L2-3 is noted.       Impression:      1. Persistent and unchanged bilateral moderate hydronephrosis and  hydroureter and moderately distended urinary bladder. There is a  probable bilateral vesicoureteral reflux as suggested previously. No  obstructing calculi.  2. Significant large volume stool throughout the colon, more severely in  the rectum. Moderate rectal wall thickening with surrounding fat  infiltration. This may represent stercoral proctitis. No finding to  suggest obstruction.  3. Atelectatic change in the lower lungs bilaterally and small bibasilar  pleural effusion.  4. Nonacute findings of the remaining abdomen is similar to the previous  study.                                                  This report was signed and finalized on 10/10/2024 8:22 AM by Dr. Marty Suresh MD.       XR Abdomen KUB [[id number]] Collected: 10/05/24 1730     Updated: 10/05/24 1734    Narrative:      XR ABDOMEN KUB- 10/5/2024 4:20 PM     HISTORY: NG placement; A41.9-Sepsis, unspecified organism; N30.00-Acute  cystitis without hematuria; K52.9-Noninfective gastroenteritis and  colitis, unspecified; G93.41-Metabolic encephalopathy; J96.01-Acute  respiratory failure with hypoxia       TECHNIQUE:Single AP view of the abdomen.     COMPARISON: Same day at 4:48 p.m.       Impression:      FINDINGS/impression:      Interval removal of the initial enteric tube.     Interval advancement of the new enteric tube with weighted tip  terminating over the gastric body/fundus region.                 This report was signed and finalized on 10/5/2024 5:31 PM by Sunny Degroot.       XR Abdomen KUB [[id number]] Collected: 10/05/24 1655     Updated: 10/05/24 1700    Narrative:      XR ABDOMEN KUB- 10/5/2024 3:50 PM     HISTORY: NG placement; A41.9-Sepsis, unspecified organism; N30.00-Acute  cystitis without hematuria; K52.9-Noninfective gastroenteritis and  colitis, unspecified; G93.41-Metabolic encephalopathy; J96.01-Acute  respiratory failure with hypoxia       TECHNIQUE:Single AP view of the abdomen.     COMPARISON: 10/5/2024       Impression:      Findings/impression:     Previously noted enteric tube extends below the diaphragm into the  stomach with the sidehole overlying the gastric body and tip overlying  the gastric antrum.     There appears to be placement of a second enteric tube with weighted  distal tip. This appears to be curled upon itself at the level of the GE  junction. Recommend repositioning.           This report was signed and finalized on 10/5/2024 4:57 PM by Sunny Degroot.       XR Abdomen KUB [[id number]] Collected: 10/05/24 0903     Updated: 10/05/24 0907    Narrative:      EXAMINATION: XR ABDOMEN KUB-     10/5/2024 6:49 AM     HISTORY: OG placement; A41.9-Sepsis, unspecified organism; N30.00-Acute  cystitis without hematuria; K52.9-Noninfective gastroenteritis and  colitis, unspecified; G93.41-Metabolic encephalopathy; J96.01-Acute  respiratory failure with hypoxia     One-view portable abdomen.     Well-positioned orogastric tube passing through the esophagus and into  the stomach.  The tip lies in the distal stomach.       Impression:      1. Well-positioned OG tube.           This report was signed and finalized on 10/5/2024 9:04 AM by Dr. Adam Monroy MD.       XR Abdomen KUB [[id number]] Collected:  10/05/24 0637     Updated: 10/05/24 0640    Narrative:      EXAMINATION: XR ABDOMEN KUB-     10/5/2024 1:28 AM     HISTORY: Generalized Tenderness     1 view abdomen/KUB.     COMPARISON:  4/11/2019.     Mild scoliosis.  Degenerative spine changes.  Pelvic phleboliths.     No bowel obstruction.  Constipation noted with moderate fecal material.       Impression:      1. No acute abnormality.           This report was signed and finalized on 10/5/2024 6:37 AM by Dr. Adam Monroy MD.       XR Chest 1 View [[id number]] Collected: 10/05/24 0636     Updated: 10/05/24 0639    Narrative:      EXAMINATION: XR CHEST 1 VW-     10/5/2024 1:28 AM     HISTORY: Fever     1 view chest x-ray.     COMPARISON:  12/18/2023.     Heart size is normal.  The mediastinum is within normal limits.     The lungs are normally expanded with no pneumonia or pneumothorax.  Mild chronic appearing interstitial disease with a few calcified  granulomas.  No congestive failure changes.       Impression:      1. No acute disease.                 This report was signed and finalized on 10/5/2024 6:36 AM by Dr. Adam Monroy MD.       CT Abdomen Pelvis Without Contrast [[id number]] Collected: 10/05/24 0624     Updated: 10/05/24 0632    Narrative:      EXAMINATION: CT ABDOMEN PELVIS WO CONTRAST-      10/5/2024 3:37 AM     HISTORY: sepsis; A41.9-Sepsis, unspecified organism; N30.00-Acute  cystitis without hematuria; K52.9-Noninfective gastroenteritis and  colitis, unspecified; G93.41-Metabolic encephalopathy. Altered mental  status.     In order to have a CT radiation dose as low as reasonably achievable  Automated Exposure Control was utilized for adjustment of the mA and/or  KV according to patient size.     CT Dose DLP = 1481.44 mGy.cm.  (If there are multiple studies performed at the same time this  represents the total dose).     Noncontrast abdomen/pelvis CT.  Axial, sagittal, and coronal sequences.     A preliminary [employer] report was faxed to the  Emergency Department  immediately after this study was interpreted at 6:06 a.m.     COMPARISON:  11/5/2023.     Heart size is within normal limits.  Heart valve calcification noted.  Bibasilar interstitial disease.  Chronic change and atelectasis.  The interstitial disease may represent early edema.     Cholecystectomy clips.  Normal noncontrast appearance of the liver and spleen.  The pancreas is atrophic.     Chronic RIGHT hydronephrosis and RIGHT ureteral dilation.  Air within the RIGHT renal collecting system and upper RIGHT ureter is  indeterminate. This could be related to UTI or could be iatrogenic if  this patient has been catheterized.  The urinary bladder is moderately distended.     Mild LEFT hydronephrosis and LEFT ureteral dilation is a new finding.  Probable vesicoureteral reflux based on bladder distention.     Moderate fecal material within the colon.  No bowel obstruction.  No mass or abscess.  Pelvic phleboliths noted.       Impression:      1. Moderately distended urinary bladder with bilateral hydronephrosis  and ureteral dilation compatible with vesicoureteral reflux.  2. Indeterminant small amount of air within the RIGHT renal collecting  system and upper RIGHT ureter. Correlate with urinalysis.  3. Constipation.  4. No mass, bowel obstruction, or abscess.     This report was signed and finalized on 10/5/2024 6:29 AM by Dr. Adam Monroy MD.       CT Head Without Contrast [[id number]] Collected: 10/05/24 0622     Updated: 10/05/24 0627    Narrative:      EXAMINATION: CT HEAD WO CONTRAST-      10/5/2024 3:48 AM     HISTORY: Altered mental status; A41.9-Sepsis, unspecified organism;  N30.00-Acute cystitis without hematuria; K52.9-Noninfective  gastroenteritis and colitis, unspecified; G93.41-Metabolic  encephalopathy     In order to have a CT radiation dose as low as reasonably achievable  Automated Exposure Control was utilized for adjustment of the mA and/or  KV according to patient size.      CT Dose DLP = 1481.  (If there are multiple studies performed at the same time this  represents the total dose).     Noncontrast head CT.  Axial, sagittal, and coronal sequences.     COMPARISON:  12/18/2023.     A preliminary [employer] report was faxed to the Emergency Department  immediately after this study was interpreted at 5:37 a.m.     Images are significantly limited by patient motion.     No intracranial hematoma or mass effect.  Atrophy and small vessel disease.  Ventricle size is similar to what was seen on the previous exam.     RIGHT maxillary sinus opacification noted.       Impression:      1. Motion limited exam.  2. No intracranial hematoma or midline shift.  3. Ventricle size is appropriate.     This report was signed and finalized on 10/5/2024 6:24 AM by Dr. Adam Monroy MD.       XR Chest 1 View [[id number]] Collected: 10/05/24 0606     Updated: 10/05/24 0610    Narrative:      EXAMINATION: XR CHEST 1 VW-     10/5/2024 4:10 AM     HISTORY: TUBE PLACEMENT; A41.9-Sepsis, unspecified organism;  N30.00-Acute cystitis without hematuria; K52.9-Noninfective  gastroenteritis and colitis, unspecified; G93.41-Metabolic  encephalopathy     1 view chest x-ray.     COMPARISON:  Earlier today at 2:32 a.m.     Magnified heart size.     The lungs show perihilar interstitial prominence compatible with early  edema.  No pneumothorax.     Endotracheal tube present with the tip 44 mm above the hiram.  Well-positioned nasogastric tube extending to the mid/distal stomach.       Impression:      1. Well-positioned tubes.  2. Interstitial infiltrate for which failure change in interstitial  edema is favored.  3. No pneumothorax.           This report was signed and finalized on 10/5/2024 6:07 AM by Dr. Adam Monroy MD.             LAB RESULTS:      Lab 10/15/24  0504 10/09/24  0439   WBC 7.91 7.93   HEMOGLOBIN 8.2* 7.6*   HEMATOCRIT 26.5* 24.5*   PLATELETS 203 155   MCV 91.7 89.1         Lab 10/15/24  0504  10/12/24  0244 10/11/24  0523 10/09/24  0439   SODIUM 138 140 139 138   POTASSIUM 3.8 3.7 3.4* 3.9   CHLORIDE 104 105 106 106   CO2 27.0 24.0 25.0 21.0*   ANION GAP 7.0 11.0 8.0 11.0   BUN 23 30* 36* 44*   CREATININE 1.24* 1.49* 1.58* 1.80*   EGFR 43.5* 34.9* 32.6* 27.8*   GLUCOSE 124* 138* 127* 124*   CALCIUM 8.9 9.3 9.2 9.1   PHOSPHORUS  --   --  2.9 3.3         Lab 10/11/24  0523 10/09/24  0439   ALBUMIN 2.3* 2.3*                 Lab 10/12/24  0244   IRON 62   IRON SATURATION (TSAT) 24   TIBC 258*   TRANSFERRIN 173*         Brief Urine Lab Results  (Last result in the past 365 days)        Color   Clarity   Blood   Leuk Est   Nitrite   Protein   CREAT   Urine HCG        10/05/24 0208 Yellow   Turbid   Moderate (2+)   Large (3+)   Negative   >=300 mg/dL (3+)                 Microbiology Results (last 10 days)       Procedure Component Value - Date/Time    Blood Culture - Blood, Hand, Left [[id number]]  (Normal) Collected: 10/07/24 1634    Lab Status: Final result Specimen: Blood from Hand, Left Updated: 10/12/24 1645     Blood Culture No growth at 5 days    Blood Culture - Blood, Hand, Right [[id number]]  (Normal) Collected: 10/07/24 1447    Lab Status: Final result Specimen: Blood from Hand, Right Updated: 10/12/24 1516     Blood Culture No growth at 5 days            Hospital Course:   Patient is an 84-year-old  female with past medical history significant for hypertension, chronic kidney disease, chronic pain syndrome the presented to our facility on 10/5/2024 with change in mental status in addition to worsening weakness.  The admission history and physical indicated that early on the morning of admission patient got up to the bedside commode, became very disoriented and weak, and EMS was subsequently activated.  Upon arrival to the emergency room lab work revealed evidence of acute kidney injury with a creatinine of 2.18, BUN of 47, white blood cell count was elevated at 22,000, and patient also had a  temperature of 103.7 degrees.  Her heart rate was 113.  Her procalcitonin was found to be greater than 65.  Criteria was met for severe sepsis.  Patient was admitted to the intensivist team in the medical ICU.  While still in the emergency department patient's condition did deteriorate to a point of documented cardiopulmonary arrest which was felt to be related to sepsis and critical illness, patient was intubated and placed on mechanical ventilation, and briefly required CPR and ROSC was achieved.  Documentation from the intensivist indicated 1 round of ACLS was performed and 1 dose of epinephrine was given only.  Patient has had no additional cardiopulmonary issues during the remainder of his hospitalization.    Her workup was consistent with gram-negative bacteremia felt to be of urinary source.  Patient was initially started on intravenous fluids in addition to empiric antibiotics.  She was quickly extubated on 10/6/2024 and has had no respiratory issues since that time, and has been on room air.  Her blood cultures were positive for E. coli.  Interestingly, her urine culture was positive for Morganella.  On imaging she did have evidence of bilateral hydronephrosis which was felt to be related to urinary retention.  A Smith catheter was placed.  On her cultures, both organisms (E. coli and Morganella) were susceptible to ceftriaxone.  Plan is to complete a total of 14 days of antibiotics per urology recommendations.  Patient has now been transition from IV ceftriaxone to oral cefdinir to complete a total of 14 days of treatment in the outpatient setting.    Urology has plans to repeat a renal ultrasound in the outpatient setting prior to an in office voiding trial.  She will be discharged from the hospital with Smith catheter in place.    Regarding her urinary retention there was concern that a component of prominent constipation was also a contributing source.  Patient has been on an aggressive bowel regimen  "which included milk of molasses enema, in addition to scheduled Adriana-Colace, MiraLAX, in addition to lactulose.  She has had multiple good bowel movements during this hospitalization, and in fact got to a point where her bowel frequency trended towards loose and difficult to control stools.  I have subsequently backed down on her bowel regimen to be less aggressive in nature.  At the time of discharge I will keep her on a stool softener plus scheduled MiraLAX.  It will be important to ensure that she continues to have regular bowel movements in the outpatient setting.    Regarding her acute kidney injury her renal function continues to improve.  I did repeat labs prior to discharge today and her creatinine is down to 1.24.  This is the best her creatinine has been throughout this hospitalization.    Patient has rather significant generalized weakness and deconditioning.  Her initial plan was to go home with her daughter who assisted her in her care, however recently patient did realize the extent of her deconditioning, poor stamina, and generalized weakness, and was agreeable to placement at [facility] nursing [facility] for rehabilitation.  Plans are in place for discharge to [facility] today for ongoing care and rehabilitation.    Finally, looking back at records patient does appear to have a history of chronic anemia.  She has had no signs or symptoms of bleeding during this hospitalization her hemoglobin has been stable.  During this hospitalization she has been on heparin prophylaxis which she has tolerated without problem.  Her hemoglobin on the day of discharge was 8.2, also the best hemoglobin she has had during this hospitalization.    Physical Exam on Discharge:  /56 (BP Location: Right arm, Patient Position: Lying)   Pulse 78   Temp 98.3 °F (36.8 °C) (Oral)   Resp 18   Ht 157.5 cm (62\")   Wt 85.9 kg (189 lb 4.8 oz)   LMP  (LMP Unknown)   SpO2 96%   BMI 34.62 kg/m²   Physical Exam  Vitals " reviewed.   Constitutional:       General: She is not in acute distress.     Appearance: She is not toxic-appearing.   HENT:      Head: Normocephalic.      Mouth/Throat:      Mouth: Mucous membranes are moist.   Pulmonary:      Effort: Pulmonary effort is normal. No respiratory distress.      Comments: On room air  Abdominal:      Palpations: Abdomen is soft.      Tenderness: There is no abdominal tenderness.   Genitourinary:     Comments: Smith in place  Neurological:      General: No focal deficit present.      Mental Status: She is alert.      Motor: Weakness present.   Psychiatric:         Mood and Affect: Mood normal.       Condition on Discharge: medically stable    Discharge Disposition:  Skilled Nursing Facility (DC - External)    Discharge Medications:     Discharge Medications        New Medications        Instructions Start Date   amLODIPine 5 MG tablet  Commonly known as: NORVASC   5 mg, Oral, Every 24 Hours Scheduled      cefdinir 300 MG capsule  Commonly known as: OMNICEF   300 mg, Oral, Every 24 Hours Scheduled      oxyCODONE 10 MG tablet  Commonly known as: ROXICODONE  Replaces: oxyCODONE HCl 15 MG tablet    10 mg, Oral, Every 6 Hours PRN      polyethylene glycol 17 g packet  Commonly known as: MIRALAX   17 g, Oral, Daily      sennosides-docusate 8.6-50 MG per tablet  Commonly known as: PERICOLACE   2 tablets, Oral, 2 Times Daily             Continue These Medications        Instructions Start Date   acetaminophen 325 MG tablet  Commonly known as: TYLENOL   650 mg, Oral, Every 6 Hours PRN      atorvastatin 40 MG tablet  Commonly known as: LIPITOR   40 mg, Oral, Daily      carvedilol 12.5 MG tablet  Commonly known as: COREG   12.5 mg, Oral, 2 Times Daily With Meals      donepezil 10 MG tablet  Commonly known as: ARICEPT   10 mg, Oral, Nightly      ergocalciferol 1.25 MG (90862 UT) capsule  Commonly known as: ERGOCALCIFEROL   50,000 Units, Oral, Weekly, Saturday      lansoprazole 30 MG  capsule  Commonly known as: PREVACID   30 mg, Oral, Daily      levothyroxine 50 MCG tablet  Commonly known as: SYNTHROID, LEVOTHROID   50 mcg, Oral, Daily      naloxone 4 MG/0.1ML nasal spray  Commonly known as: NARCAN   1 spray, Nasal, As Needed      sertraline 25 MG tablet  Commonly known as: ZOLOFT   25 mg, Oral, Daily      SUMAtriptan 50 MG tablet  Commonly known as: IMITREX   50 mg, Oral, Daily PRN, Take one tablet at onset of headache. May repeat dose one time in 2 hours if headache not relieved.             Stop These Medications      butalbital-acetaminophen-caffeine -40 MG per tablet  Commonly known as: FIORICET, ESGIC     cyclobenzaprine 10 MG tablet  Commonly known as: FLEXERIL     ipratropium 0.03 % nasal spray  Commonly known as: ATROVENT     oxyCODONE HCl 15 MG tablet   Replaced by: oxyCODONE 10 MG tablet     venlafaxine 25 MG tablet  Commonly known as: EFFEXOR              Discharge Diet: regular diet; heart healthy    Activity at Discharge: as tolerated; PT and OT    Follow-up Appointments:   Future Appointments   Date Time Provider Department Center   10/17/2024  1:45 PM Lakshmi Wilkerson, [facility]   In addition, urology recommends outpatient follow-up within 1 week and we will coordinate a renal ultrasound prior to this appointment as well.    Test Results Pending at Discharge: none    Electronically signed by Abdirahman Schroeder MD, 10/15/24, 07:11 CDT.    Time: 40 minutes.

## 2024-10-15 NOTE — CASE MANAGEMENT/SOCIAL WORK
Continued Stay Note  [facility]     Patient Name: Jennifer Gomes  MRN: [id number]  Today's Date: 10/15/2024    Admit Date: 10/5/2024    Plan: [facility]   Discharge Plan       Row Name 10/15/24 1001       Plan    Final Discharge Disposition Code 03 - skilled nursing facility (SNF)    Final Note Pt is being dcd to [facility], skilled level. Call report number is [phone]. Fax number is [phone]. Pharmacy correct in [facility] (acquired by [facility] in 2014) for [facility].                   Discharge Codes    No documentation.                 Expected Discharge Date and Time       Expected Discharge Date Expected Discharge Time    Oct 15, 2024               MICHAEL Burt

## 2024-10-15 NOTE — PLAN OF CARE
Goal Outcome Evaluation:  Plan of Care Reviewed With: patient        Progress: no change  Outcome Evaluation: VSS. A&Ox4, forgetful at times. Up x2 w/ walker. C/o generalized pain, prns given w/ relief, turned and repositioned q2h w/ pillow support. V per f/c. SCDs.Safety maintained.

## 2024-10-15 NOTE — TELEPHONE ENCOUNTER
----- Message from Moiz Gallegos sent at 10/11/2024  9:50 AM CDT -----  Regarding: Hospital follow up  Will need follow up in 1 week with [facility] or [place] for voiding trial with renal ultrasound prior to Smith removal. Dx: bilateral hydronephrosis.

## 2024-10-15 NOTE — PROGRESS NOTES
[facility] Palliative Care Services    Palliative Care Daily Progress Note   Chief complaint-chart review    Code Status:   Code Status and Medical Interventions: CPR (Attempt to Resuscitate); Full Support   Ordered at: 10/07/24 1320     Code Status (Patient has no pulse and is not breathing):    CPR (Attempt to Resuscitate)     Medical Interventions (Patient has pulse or is breathing):    Full Support      Advanced Directives: Advance Directive Status: Patient does not have advance directive   Goals of Care: Ongoing.     S: Medical record reviewed. Events noted.  Urology recommendations a Smith catheter replaced and plans for repeat imaging with Smith in place.  Plan discharge with Smith, urology to follow-up outpatient.  Anticipating discharge to SNF today.     Pain Assessment  Preferred Pain Scale: number (Numeric Rating Pain Scale)  Nonverbal Indicators of Pain: nonverbal indicators absent  CPOT Facial Expression: 0-->relaxed, neutral  CPOT Body Movements: 0-->absence of movements  CPOT Muscle Tension: 0-->relaxed  Ventilator Compliance/Vocalization: 0-->talking in normal tone or no sound  CPOT Score: 0  Pain Location: other (see comments) (Genralized)  Pain Description: aching    O:     Intake/Output Summary (Last 24 hours) at 10/15/2024 1441  Last data filed at 10/15/2024 1240  Gross per 24 hour   Intake 720 ml   Output 1300 ml   Net -580 ml       Diagnostics and current medications: Reviewed.      Current Facility-Administered Medications:     acetaminophen (TYLENOL) tablet 650 mg, 650 mg, Oral, Q4H PRN, 650 mg at 10/15/24 0846 **OR** acetaminophen (TYLENOL) 160 MG/5ML oral solution 650 mg, 650 mg, Oral, Q4H PRN, 650 mg at 10/06/24 1232 **OR** acetaminophen (TYLENOL) suppository 650 mg, 650 mg, Rectal, Q4H PRN, Cuba Espitia MD    amLODIPine (NORVASC) tablet 5 mg, 5 mg, Oral, Q24H, Abdirahman Schroeder MD, 5 mg at 10/15/24 0846    atorvastatin (LIPITOR) tablet 40 mg, 40 mg, Oral,  Daily, Quentin Abernathy DO, 40 mg at 10/15/24 0846    [Held by provider] sennosides-docusate (PERICOLACE) 8.6-50 MG per tablet 2 tablet, 2 tablet, Oral, BID, 2 tablet at 10/12/24 0843 **AND** bisacodyl (DULCOLAX) EC tablet 5 mg, 5 mg, Oral, Daily PRN **AND** bisacodyl (DULCOLAX) suppository 10 mg, 10 mg, Rectal, Daily PRN, Lonnie Murphy DO, 10 mg at 10/15/24 0816    carvedilol (COREG) tablet 12.5 mg, 12.5 mg, Oral, Q12H, Zeinab Trujillo MD, 12.5 mg at 10/15/24 0846    cefdinir (OMNICEF) capsule 300 mg, 300 mg, Oral, Q24H, Abdirahman Schroeder MD, 300 mg at 10/15/24 0846    cholecalciferol (VITAMIN D3) tablet 800 Units, 800 Units, Oral, Daily, Quentin Abernathy DO, 800 Units at 10/15/24 0846    donepezil (ARICEPT) tablet 10 mg, 10 mg, Oral, Nightly, Quentin Abernathy DO, 10 mg at 10/14/24 2150    heparin (porcine) 5000 UNIT/ML injection 5,000 Units, 5,000 Units, Subcutaneous, Q8H, Quentin Abernathy DO, 5,000 Units at 10/15/24 0543    levothyroxine (SYNTHROID, LEVOTHROID) tablet 50 mcg, 50 mcg, Oral, Q AM, Quentin Abernathy DO, 50 mcg at 10/15/24 0543    melatonin tablet 10 mg, 10 mg, Oral, Nightly PRN, Han Gibbs APRN, 10 mg at 10/10/24 2126    oxyCODONE (ROXICODONE) immediate release tablet 10 mg, 10 mg, Oral, Q4H PRN, Abdirahman Schroeder MD, 10 mg at 10/15/24 1250    pantoprazole (PROTONIX) EC tablet 40 mg, 40 mg, Oral, Q AM, Quentin Abernathy DO, 40 mg at 10/15/24 0543    [Held by provider] polyethylene glycol (MIRALAX) packet 17 g, 17 g, Oral, Daily, Abdirhaman Schroeder MD    sertraline (ZOLOFT) tablet 25 mg, 25 mg, Oral, Daily, Quentin Abernathy DO, 25 mg at 10/15/24 0846    sodium chloride 0.9 % flush 10 mL, 10 mL, Intravenous, Q12H, Cuba Espitia MD, 10 mL at 10/12/24 0845    sodium chloride 0.9 % flush 10 mL, 10 mL, Intravenous, PRN, Cuba Espitia MD    Allergies   Allergen Reactions    Ropinirole Hcl Shortness Of  "Breath    Codeine Itching and Mental Status Change    Definity [Perflutren Lipid Microsphere] Other (See Comments)     Severe back pain    Ambien [Zolpidem] Other (See Comments)     HYPER     Eszopiclone Other (See Comments)     MAKES PT HYPER     Pregabalin Dizziness    Tizanidine Other (See Comments)     Terrible nightmares     I have utilized all available immediate resources to obtain, update, or review the patient's current medications (including all prescriptions, over-the-counter products, herbals, cannabis/cannabidiol products, and vitamin/mineral/dietary (nutritional) supplements) for name, route of administration, type, dose and frequency.    A:    /59 (BP Location: Right arm, Patient Position: Lying)   Pulse 77   Temp 98.3 °F (36.8 °C) (Oral)   Resp 18   Ht 157.5 cm (62\")   Wt 85.9 kg (189 lb 4.8 oz)   LMP  (LMP Unknown)   SpO2 93%   BMI 34.62 kg/m²      Patient status: Disease state: Controlled with current treatments.  Current Functional status: Palliative Performance Scale Score: Performance 30% based on the following measures: Ambulation: Totally bed bound, Activity and Evidence of Disease: Unable to do any work, extensive evidence of disease, Self-Care: Total care required,  Intake: Reduced, LOC: Full, drowsy or confusion   Baseline Functional status: Palliative Performance Scale Score: Performance 70% based on the following measures: Ambulation: Reduced, Activity and Evidence of Disease: Unable to do normal work, some evidence of disease, Self-Care: Fully independent,  Intake: Normal or reduced, LOC: Full   Baseline ECOG Status(2) Ambulatory and capable of self care, unable to carry out work activity, up and about > 50% or waking hours.  Nutritional status: Albumin 2.3 Body mass index is 32.08 kg/m².      Hospital Problem List      Sepsis    Sepsis due to urinary tract infection     Impression/Problem List:     Cardiopulmonary arrest  Alzheimer's dementia  Sepsis due to bacteremia-E. 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 Garcia  Depression  Hypothyroidism  Fibromyalgia  GERD  Hyperlipidemia  Hypertension  Incontinence  Restless leg syndrome  Obstructive sleep apnea-not using CPAP  Lumbar stenosis  Migraines  Peptic ulcer   Advanced age     Recommendations/Plan:  1. plan: Goals of care include CODE STATUS CPR/full support interventions.     Family support: The patient receives support from her  and daughter..  Advance Directives: Not on file     POA/Healthcare surrogate-spouse and daughter-next of kin.     2.  Palliative care encounter  - Prognosis is guarded long-term secondary to cardiopulmonary arrest, Alzheimer's dementia, sepsis, acute kidney injury, adenocarcinoma of endometrium, multiple comorbidities, and advanced age.  -Patient and family appears to have fair prognostic awareness.      -Apparently patient admitted as DNR/DO NOT INTUBATE.  Transferred to CT and experience cardiopulmonary arrest and a CODE BLUE was called and received CPR x 5-10 minutes per report.  Placed in code chill protocol at 0501.  Admitted to critical care unit on ventilator support with sepsis.  Started on IV fluids and IV antibiotics.  Hypothermia protocol discontinued.  Acute kidney injury felt likely multifactorial.    10/6-unit of PRBC 10/6.    -Extubated to nasal cannula.    -Evaluated by speech therapy due to mild oral dysphagia and started on thin liquid soft to chew solid with chopped meat diet.    -Therapy recommends SNF.   -Anticipating hospice at discharge per intensivist.   10/7-Urology consulted due to bilateral hydronephrosis and UTI, recommends following to determine bladder emptying, bowel management, and broad-spectrum antibiotics x 14 days total for complicated UTI.    10/8-Due to postvoid  residuals greater than 200 a Smith catheter has been replaced today and plans for repeat CT in 48 hours.  10/10-Repeat CT shows persistent and unchanged bilateral moderate hydronephrosis and hydroureter, and moderately distended urinary bladder. Smith catheter to be placed.      10/7-clarified CODE STATUS CPR/full support interventions    10/15-continue supportive measures  -Urology following, plan to discharge with Smith catheter, follow up outpatient.   -Anticipating SNF today.   -At risk for rehospitalization's and decline given multiple comorbid factors and cardiopulmonary arrest event      Thank you for allowing us to participate in patient's plan of care. Palliative Care Team will continue to follow patient.     Joana Rush, MORGAN  10/15/2024  14:41 CDT

## 2024-10-16 NOTE — THERAPY DISCHARGE NOTE
Acute Care - Occupational Therapy Discharge Summary  [facility]     Patient Name: Jennifer Gomes  : 1940  MRN: [id number]    Today's Date: 10/16/2024                 Admit Date: 10/5/2024        OT Recommendation and Plan    Visit Dx:    ICD-10-CM ICD-9-CM   1. Sepsis without acute organ dysfunction, due to unspecified organism  A41.9 038.9     995.91   2. Acute cystitis without hematuria  N30.00 595.0   3. Gastroenteritis  K52.9 558.9   4. Acute metabolic encephalopathy  G93.41 348.31   5. Acute respiratory failure with hypoxia  J96.01 518.81   6. Dysphagia, unspecified type [R13.10]  R13.10 787.20   7. Impaired mobility [Z74.09]  Z74.09 799.89   8. Chronic pain syndrome  G89.4 338.4                OT Rehab Goals       Row Name 10/16/24 1000             Transfer Goal 1 (OT)    Activity/Assistive Device (Transfer Goal 1, OT) commode, bedside without drop arms  -CS      [place] Level/Cues Needed (Transfer Goal 1, OT) maximum assist (25-49% patient effort)  -CS      Time Frame (Transfer Goal 1, OT) long term goal (LTG);10 days  -CS      Progress/Outcome (Transfer Goal 1, OT) goal not met  -CS         Dressing Goal 1 (OT)    Activity/Device (Dressing Goal 1, OT) upper body dressing  -CS      [place]/Cues Needed (Dressing Goal 1, OT) standby assist  -CS      Time Frame (Dressing Goal 1, OT) long term goal (LTG);10 days  -CS      Strategies/Barriers (Dressing Goal 1, OT) While seated EOB.  -CS      Progress/Outcome (Dressing Goal 1, OT) goal not met  -CS         Toileting Goal 1 (OT)    Activity/Device (Toileting Goal 1, OT) toileting skills, all;commode, bedside without drop arms  -CS      [place] Level/Cues Needed (Toileting Goal 1, OT) maximum assist (25-49% patient effort)  -CS      Time Frame (Toileting Goal 1, OT) long term goal (LTG);10 days  -CS      Progress/Outcome (Toileting Goal 1, OT) goal not met  -CS         Problem Specific Goal 1 (OT)    Problem Specific Goal 1 (OT) Pt will  Gave pt Dr Tiffani Baez message. Pt will be coming into [place] office today, 10/30/2023, to give a urine sample for a urine dip per Dr Mark Angeles between 1 PM and 3 PM.  Please add to nurse schedule. independently implement one pain management technique to decrease pain and improve functional performance.  -CS      Time Frame (Problem Specific Goal 1, OT) long term goal (LTG);by discharge  -CS      Progress/Outcome (Problem Specific Goal 1, OT) goal not met  -CS                User Key  (r) = Recorded By, (t) = Taken By, (c) = Cosigned By      Initials Name Provider Type Discipline    CS Ivy Dawson OTR/L, ABDOUL Occupational Therapist OT                        Timed Therapy Charges  Total Units: 2      Suggested Charges  Total Units: 2      Procedure Name Documented Minutes Units Code    HC OT THERAPEUTIC ACT EA 15 MIN 24 2   36698 (CPT®)                 Documented Minutes  Total Minutes: 24      Therapy Provided Minutes    42481 - OT Therapeutic Activity Minutes 24                        OT Discharge Summary  Anticipated Discharge Disposition (OT): skilled nursing facility  Reason for Discharge: Discharge from facility  Outcomes Achieved: Refer to plan of care for updates on goals achieved  Discharge Destination: SNF      DEMARIO Latham CNT  10/16/2024

## 2024-10-16 NOTE — THERAPY DISCHARGE NOTE
Acute Care - Physical Therapy Discharge Summary  [facility]       Patient Name: Jennifer Gomes  : 1940  MRN: [id number]    Today's Date: 10/16/2024                 Admit Date: 10/5/2024      PT Recommendation and Plan    Visit Dx:    ICD-10-CM ICD-9-CM   1. Sepsis without acute organ dysfunction, due to unspecified organism  A41.9 038.9     995.91   2. Acute cystitis without hematuria  N30.00 595.0   3. Gastroenteritis  K52.9 558.9   4. Acute metabolic encephalopathy  G93.41 348.31   5. Acute respiratory failure with hypoxia  J96.01 518.81   6. Dysphagia, unspecified type [R13.10]  R13.10 787.20   7. Impaired mobility [Z74.09]  Z74.09 799.89   8. Chronic pain syndrome  G89.4 338.4                PT Rehab Goals       Row Name 10/16/24 1100             Bed Mobility Goal 1 (PT)    Activity/Assistive Device (Bed Mobility Goal 1, PT) bed mobility activities, all;rolling to left;rolling to right;supine to sit;sit to supine  -AB      [place] Level/Cues Needed (Bed Mobility Goal 1, PT) minimum assist (75% or more patient effort)  -AB      Time Frame (Bed Mobility Goal 1, PT) long term goal (LTG);10 days  -AB      Progress/Outcomes (Bed Mobility Goal 1, PT) goal not met  -AB         Transfer Goal 1 (PT)    Activity/Assistive Device (Transfer Goal 1, PT) sit-to-stand/stand-to-sit;bed-to-chair/chair-to-bed;toilet;wheelchair transfer  -AB      [place] Level/Cues Needed (Transfer Goal 1, PT) minimum assist (75% or more patient effort)  -AB      Time Frame (Transfer Goal 1, PT) long term goal (LTG);10 days  -AB      Progress/Outcome (Transfer Goal 1, PT) goal partially met  -AB         Gait Training Goal 1 (PT)    Activity/Assistive Device (Gait Training Goal 1, PT) gait (walking locomotion);decrease asymmetrical patterns;decrease fall risk;diminish gait deviation;forward stepping;improve balance and speed;increase endurance/gait distance;increase energy conservation;assistive device use;walker, rolling  -AB       [place] Level (Gait Training Goal 1, PT) minimum assist (75% or more patient effort)  -AB      Distance (Gait Training Goal 1, PT) 25'  -AB      Time Frame (Gait Training Goal 1, PT) long term goal (LTG);10 days  -AB      Progress/Outcome (Gait Training Goal 1, PT) goal not met  -AB         Problem Specific Goal 1 (PT)    Problem Specific Goal 1 (PT) Pt will participate in session with min verbal cueing and report pain 4/10 or less  -AB      Time Frame (Problem Specific Goal 1, PT) long-term goal (LTG);by discharge  -AB      Progress/Outcome (Problem Specific Goal 1, PT) goal partially met  -AB                User Key  (r) = Recorded By, (t) = Taken By, (c) = Cosigned By      Initials Name Provider Type Discipline    Cassidy Freeman, PTA Physical Therapist Assistant PT                        PT Discharge Summary  Anticipated Discharge Disposition (PT): skilled nursing facility  Reason for Discharge: Discharge from facility  Outcomes Achieved: Refer to plan of care for updates on goals achieved  Discharge Destination: SNF      Cassidy Nunez PTA   10/16/2024

## 2024-11-12 ENCOUNTER — HOSPITAL ENCOUNTER (INPATIENT)
Facility: HOSPITAL | Age: 84
LOS: 6 days | Discharge: NURSING, REHAB OR LONG-TERM CARE | DRG: 684 | End: 2024-11-18
Attending: INTERNAL MEDICINE | Admitting: FAMILY MEDICINE
Payer: MEDICARE

## 2024-11-12 ENCOUNTER — APPOINTMENT (OUTPATIENT)
Dept: CT IMAGING | Facility: HOSPITAL | Age: 84
DRG: 684 | End: 2024-11-12
Payer: MEDICARE

## 2024-11-12 DIAGNOSIS — N30.00 ACUTE CYSTITIS WITHOUT HEMATURIA: ICD-10-CM

## 2024-11-12 DIAGNOSIS — N17.9 AKI (ACUTE KIDNEY INJURY): Primary | ICD-10-CM

## 2024-11-12 DIAGNOSIS — Z74.09 IMPAIRED MOBILITY: ICD-10-CM

## 2024-11-12 PROBLEM — E83.52 HYPERCALCEMIA: Status: ACTIVE | Noted: 2024-11-12

## 2024-11-12 LAB
ALBUMIN SERPL-MCNC: 3.3 G/DL (ref 3.5–5.2)
ALBUMIN/GLOB SERPL: 0.8 G/DL
ALP SERPL-CCNC: 93 U/L (ref 39–117)
ALT SERPL W P-5'-P-CCNC: 6 U/L (ref 1–33)
ANION GAP SERPL CALCULATED.3IONS-SCNC: 16 MMOL/L (ref 5–15)
ARTERIAL PATENCY WRIST A: POSITIVE
AST SERPL-CCNC: 9 U/L (ref 1–32)
ATMOSPHERIC PRESS: 756 MMHG
BACTERIA UR QL AUTO: ABNORMAL /HPF
BASE EXCESS BLDA CALC-SCNC: -8.8 MMOL/L (ref 0–2)
BASOPHILS # BLD AUTO: 0.01 10*3/MM3 (ref 0–0.2)
BASOPHILS NFR BLD AUTO: 0.1 % (ref 0–1.5)
BDY SITE: ABNORMAL
BILIRUB SERPL-MCNC: 0.3 MG/DL (ref 0–1.2)
BILIRUB UR QL STRIP: NEGATIVE
BODY TEMPERATURE: 37
BUN SERPL-MCNC: 42 MG/DL (ref 8–23)
BUN/CREAT SERPL: 17.1 (ref 7–25)
CALCIUM SPEC-SCNC: 10.7 MG/DL (ref 8.6–10.5)
CHLORIDE SERPL-SCNC: 109 MMOL/L (ref 98–107)
CLARITY UR: ABNORMAL
CO2 SERPL-SCNC: 17 MMOL/L (ref 22–29)
COLOR UR: YELLOW
CREAT SERPL-MCNC: 2.46 MG/DL (ref 0.57–1)
D-LACTATE SERPL-SCNC: 1.8 MMOL/L (ref 0.5–2)
DEPRECATED RDW RBC AUTO: 53.1 FL (ref 37–54)
EGFRCR SERPLBLD CKD-EPI 2021: 19.1 ML/MIN/1.73
EOSINOPHIL # BLD AUTO: 0 10*3/MM3 (ref 0–0.4)
EOSINOPHIL NFR BLD AUTO: 0 % (ref 0.3–6.2)
ERYTHROCYTE [DISTWIDTH] IN BLOOD BY AUTOMATED COUNT: 16 % (ref 12.3–15.4)
GLOBULIN UR ELPH-MCNC: 4 GM/DL
GLUCOSE SERPL-MCNC: 187 MG/DL (ref 65–99)
GLUCOSE UR STRIP-MCNC: NEGATIVE MG/DL
HBA1C MFR BLD: 5 % (ref 4.8–5.6)
HCO3 BLDA-SCNC: 16.8 MMOL/L (ref 20–26)
HCT VFR BLD AUTO: 27.7 % (ref 34–46.6)
HGB BLD-MCNC: 8.8 G/DL (ref 12–15.9)
HGB UR QL STRIP.AUTO: ABNORMAL
HOLD SPECIMEN: NORMAL
HOLD SPECIMEN: NORMAL
HYALINE CASTS UR QL AUTO: ABNORMAL /LPF
IMM GRANULOCYTES # BLD AUTO: 0.09 10*3/MM3 (ref 0–0.05)
IMM GRANULOCYTES NFR BLD AUTO: 0.7 % (ref 0–0.5)
KETONES UR QL STRIP: ABNORMAL
LEUKOCYTE ESTERASE UR QL STRIP.AUTO: ABNORMAL
LIPASE SERPL-CCNC: 10 U/L (ref 13–60)
LYMPHOCYTES # BLD AUTO: 0.61 10*3/MM3 (ref 0.7–3.1)
LYMPHOCYTES NFR BLD AUTO: 5 % (ref 19.6–45.3)
Lab: ABNORMAL
MAGNESIUM SERPL-MCNC: 2.1 MG/DL (ref 1.6–2.4)
MCH RBC QN AUTO: 29.2 PG (ref 26.6–33)
MCHC RBC AUTO-ENTMCNC: 31.8 G/DL (ref 31.5–35.7)
MCV RBC AUTO: 92 FL (ref 79–97)
MODALITY: ABNORMAL
MONOCYTES # BLD AUTO: 0.55 10*3/MM3 (ref 0.1–0.9)
MONOCYTES NFR BLD AUTO: 4.5 % (ref 5–12)
NEUTROPHILS NFR BLD AUTO: 10.85 10*3/MM3 (ref 1.7–7)
NEUTROPHILS NFR BLD AUTO: 89.7 % (ref 42.7–76)
NITRITE UR QL STRIP: POSITIVE
NRBC BLD AUTO-RTO: 0 /100 WBC (ref 0–0.2)
PCO2 BLDA: 34.4 MM HG (ref 35–45)
PCO2 TEMP ADJ BLD: 34.4 MM HG (ref 35–45)
PH BLDA: 7.3 PH UNITS (ref 7.35–7.45)
PH UR STRIP.AUTO: 5.5 [PH] (ref 5–8)
PH, TEMP CORRECTED: 7.3 PH UNITS (ref 7.35–7.45)
PLATELET # BLD AUTO: 175 10*3/MM3 (ref 140–450)
PMV BLD AUTO: 10.5 FL (ref 6–12)
PO2 BLDA: 82.6 MM HG (ref 83–108)
PO2 TEMP ADJ BLD: 82.6 MM HG (ref 83–108)
POTASSIUM SERPL-SCNC: 3.3 MMOL/L (ref 3.5–5.2)
PROT SERPL-MCNC: 7.3 G/DL (ref 6–8.5)
PROT UR QL STRIP: ABNORMAL
RBC # BLD AUTO: 3.01 10*6/MM3 (ref 3.77–5.28)
RBC # UR STRIP: ABNORMAL /HPF
REF LAB TEST METHOD: ABNORMAL
SAO2 % BLDCOA: 98.6 % (ref 94–99)
SODIUM SERPL-SCNC: 142 MMOL/L (ref 136–145)
SP GR UR STRIP: 1.02 (ref 1–1.03)
SQUAMOUS #/AREA URNS HPF: ABNORMAL /HPF
UROBILINOGEN UR QL STRIP: ABNORMAL
VENTILATOR MODE: ABNORMAL
WBC # UR STRIP: ABNORMAL /HPF
WBC NRBC COR # BLD AUTO: 12.11 10*3/MM3 (ref 3.4–10.8)
WHOLE BLOOD HOLD COAG: NORMAL
WHOLE BLOOD HOLD SPECIMEN: NORMAL
YEAST URNS QL MICRO: ABNORMAL /HPF

## 2024-11-12 PROCEDURE — 25010000002 POTASSIUM CHLORIDE 10 MEQ/100ML SOLUTION: Performed by: INTERNAL MEDICINE

## 2024-11-12 PROCEDURE — 99285 EMERGENCY DEPT VISIT HI MDM: CPT

## 2024-11-12 PROCEDURE — 25010000002 PIPERACILLIN SOD-TAZOBACTAM PER 1 G: Performed by: INTERNAL MEDICINE

## 2024-11-12 PROCEDURE — 36600 WITHDRAWAL OF ARTERIAL BLOOD: CPT

## 2024-11-12 PROCEDURE — P9612 CATHETERIZE FOR URINE SPEC: HCPCS

## 2024-11-12 PROCEDURE — 25810000003 SODIUM CHLORIDE 0.9 % SOLUTION: Performed by: INTERNAL MEDICINE

## 2024-11-12 PROCEDURE — 83735 ASSAY OF MAGNESIUM: CPT | Performed by: INTERNAL MEDICINE

## 2024-11-12 PROCEDURE — 80053 COMPREHEN METABOLIC PANEL: CPT | Performed by: INTERNAL MEDICINE

## 2024-11-12 PROCEDURE — 85025 COMPLETE CBC W/AUTO DIFF WBC: CPT | Performed by: INTERNAL MEDICINE

## 2024-11-12 PROCEDURE — 25810000003 LACTATED RINGERS SOLUTION: Performed by: INTERNAL MEDICINE

## 2024-11-12 PROCEDURE — 74176 CT ABD & PELVIS W/O CONTRAST: CPT

## 2024-11-12 PROCEDURE — 87077 CULTURE AEROBIC IDENTIFY: CPT | Performed by: INTERNAL MEDICINE

## 2024-11-12 PROCEDURE — 83690 ASSAY OF LIPASE: CPT | Performed by: INTERNAL MEDICINE

## 2024-11-12 PROCEDURE — 81001 URINALYSIS AUTO W/SCOPE: CPT | Performed by: INTERNAL MEDICINE

## 2024-11-12 PROCEDURE — 87040 BLOOD CULTURE FOR BACTERIA: CPT | Performed by: INTERNAL MEDICINE

## 2024-11-12 PROCEDURE — 83605 ASSAY OF LACTIC ACID: CPT | Performed by: INTERNAL MEDICINE

## 2024-11-12 PROCEDURE — 82803 BLOOD GASES ANY COMBINATION: CPT

## 2024-11-12 PROCEDURE — 36415 COLL VENOUS BLD VENIPUNCTURE: CPT

## 2024-11-12 PROCEDURE — 87186 SC STD MICRODIL/AGAR DIL: CPT | Performed by: INTERNAL MEDICINE

## 2024-11-12 PROCEDURE — 87086 URINE CULTURE/COLONY COUNT: CPT | Performed by: INTERNAL MEDICINE

## 2024-11-12 PROCEDURE — 83036 HEMOGLOBIN GLYCOSYLATED A1C: CPT | Performed by: INTERNAL MEDICINE

## 2024-11-12 RX ORDER — CARVEDILOL 3.12 MG/1
3.12 TABLET ORAL 2 TIMES DAILY WITH MEALS
Status: DISCONTINUED | OUTPATIENT
Start: 2024-11-12 | End: 2024-11-18 | Stop reason: HOSPADM

## 2024-11-12 RX ORDER — BISACODYL 10 MG
10 SUPPOSITORY, RECTAL RECTAL DAILY PRN
Status: DISCONTINUED | OUTPATIENT
Start: 2024-11-12 | End: 2024-11-18 | Stop reason: HOSPADM

## 2024-11-12 RX ORDER — AMOXICILLIN 250 MG
2 CAPSULE ORAL 2 TIMES DAILY
Status: DISCONTINUED | OUTPATIENT
Start: 2024-11-12 | End: 2024-11-18 | Stop reason: HOSPADM

## 2024-11-12 RX ORDER — ONDANSETRON 2 MG/ML
4 INJECTION INTRAMUSCULAR; INTRAVENOUS EVERY 6 HOURS PRN
Status: DISCONTINUED | OUTPATIENT
Start: 2024-11-12 | End: 2024-11-18 | Stop reason: HOSPADM

## 2024-11-12 RX ORDER — SODIUM CHLORIDE 0.9 % (FLUSH) 0.9 %
10 SYRINGE (ML) INJECTION AS NEEDED
Status: DISCONTINUED | OUTPATIENT
Start: 2024-11-12 | End: 2024-11-18 | Stop reason: HOSPADM

## 2024-11-12 RX ORDER — PANTOPRAZOLE SODIUM 40 MG/1
40 TABLET, DELAYED RELEASE ORAL
Status: DISCONTINUED | OUTPATIENT
Start: 2024-11-13 | End: 2024-11-18 | Stop reason: HOSPADM

## 2024-11-12 RX ORDER — SODIUM CHLORIDE 9 MG/ML
75 INJECTION, SOLUTION INTRAVENOUS CONTINUOUS
Status: DISPENSED | OUTPATIENT
Start: 2024-11-12 | End: 2024-11-13

## 2024-11-12 RX ORDER — DONEPEZIL HYDROCHLORIDE 10 MG/1
10 TABLET, FILM COATED ORAL NIGHTLY
Status: DISCONTINUED | OUTPATIENT
Start: 2024-11-12 | End: 2024-11-18 | Stop reason: HOSPADM

## 2024-11-12 RX ORDER — POTASSIUM CHLORIDE 7.45 MG/ML
10 INJECTION INTRAVENOUS ONCE
Status: COMPLETED | OUTPATIENT
Start: 2024-11-12 | End: 2024-11-12

## 2024-11-12 RX ORDER — POLYETHYLENE GLYCOL 3350 17 G/17G
17 POWDER, FOR SOLUTION ORAL DAILY PRN
Status: DISCONTINUED | OUTPATIENT
Start: 2024-11-12 | End: 2024-11-18 | Stop reason: HOSPADM

## 2024-11-12 RX ORDER — ATORVASTATIN CALCIUM 40 MG/1
40 TABLET, FILM COATED ORAL DAILY
Status: DISCONTINUED | OUTPATIENT
Start: 2024-11-13 | End: 2024-11-18 | Stop reason: HOSPADM

## 2024-11-12 RX ORDER — BISACODYL 5 MG/1
5 TABLET, DELAYED RELEASE ORAL DAILY PRN
Status: DISCONTINUED | OUTPATIENT
Start: 2024-11-12 | End: 2024-11-18 | Stop reason: HOSPADM

## 2024-11-12 RX ORDER — ACETAMINOPHEN 325 MG/1
650 TABLET ORAL EVERY 4 HOURS PRN
Status: DISCONTINUED | OUTPATIENT
Start: 2024-11-12 | End: 2024-11-18 | Stop reason: HOSPADM

## 2024-11-12 RX ORDER — LEVOTHYROXINE SODIUM 50 UG/1
50 TABLET ORAL
Status: DISCONTINUED | OUTPATIENT
Start: 2024-11-13 | End: 2024-11-18 | Stop reason: HOSPADM

## 2024-11-12 RX ORDER — SODIUM CHLORIDE 0.9 % (FLUSH) 0.9 %
10 SYRINGE (ML) INJECTION EVERY 12 HOURS SCHEDULED
Status: DISCONTINUED | OUTPATIENT
Start: 2024-11-12 | End: 2024-11-18 | Stop reason: HOSPADM

## 2024-11-12 RX ORDER — SODIUM CHLORIDE 0.9 % (FLUSH) 0.9 %
10 SYRINGE (ML) INJECTION AS NEEDED
Status: DISCONTINUED | OUTPATIENT
Start: 2024-11-12 | End: 2024-11-13 | Stop reason: SDUPTHER

## 2024-11-12 RX ORDER — SODIUM CHLORIDE 9 MG/ML
40 INJECTION, SOLUTION INTRAVENOUS AS NEEDED
Status: DISCONTINUED | OUTPATIENT
Start: 2024-11-12 | End: 2024-11-18 | Stop reason: HOSPADM

## 2024-11-12 RX ADMIN — DONEPEZIL HYDROCHLORIDE 10 MG: 10 TABLET, FILM COATED ORAL at 20:35

## 2024-11-12 RX ADMIN — SODIUM CHLORIDE, POTASSIUM CHLORIDE, SODIUM LACTATE AND CALCIUM CHLORIDE 500 ML: 600; 310; 30; 20 INJECTION, SOLUTION INTRAVENOUS at 17:13

## 2024-11-12 RX ADMIN — POTASSIUM CHLORIDE 10 MEQ: 7.46 INJECTION, SOLUTION INTRAVENOUS at 20:35

## 2024-11-12 RX ADMIN — Medication 10 ML: at 20:36

## 2024-11-12 RX ADMIN — SODIUM CHLORIDE 75 ML/HR: 900 INJECTION, SOLUTION INTRAVENOUS at 20:09

## 2024-11-12 RX ADMIN — PIPERACILLIN SODIUM AND TAZOBACTAM SODIUM 3.38 G: 3; .375 INJECTION, POWDER, LYOPHILIZED, FOR SOLUTION INTRAVENOUS at 15:52

## 2024-11-12 NOTE — H&P
[facility] Medicine Services  HISTORY AND PHYSICAL    Date of Admission: 11/12/2024  Primary Care Physician: Provider, No Known    Subjective   Primary Historian: patient/records    Chief Complaint: Abdominal pain, pulled out Smith catheter    History of Present Illness  Patient is an 84-year-old female with a history of hypertension, CKD 3, chronic pain syndrome, dementia, hypothyroidism, hyperlipidemia, GERD.  She was recently hospitalized here at our facility from 10/5 through 10/15.  At that time she had a acute on chronic renal failure in the setting of acute urinary retention causing bilateral hydronephrosis and needing Smith catheter placement.  The initial urinary retention had led to UTI and sepsis at time of admission prior to Smith catheter.  She had to be treated in the ICU initially due to degree of illness and sepsis.  She ended up growing E. coli bacteremia but Morganella in her urine.  Both sensitive to ceftriaxone.  She was planned for 14 days of antibiotics at discharge and was discharged to a short-term rehab/nursing facility.  Smith catheter to stay in place for outpatient urology follow-up.  At time of discharge her creatinine was 1.2 with a GFR 43.  Since discharge from here she must have returned home.  Patient seen in ER 24 at request of ER provider.  No family in the room.  Reports are she was squirming around at home in bed and accidentally pulled out her Smith catheter with balloon fully intact.  Family reported finding stool around the bulb of the catheter.  Patient also had crusted blood in her groin per report.  On arrival here labs show again that she has acute on chronic renal failure with creatinine 2.4 and a GFR of 19.  Urine again appears to be infectious with too numerous to count white cells and 4+ bacteria.  Large blood, nitrates, leukocyte esterase.  Her lactic acid is normal.  She is not febrile.  She is not hypotensive.  Does not appear  septic.  She is alert and interactive but slow to speak when I evaluated her.  She tells me she is in a hospital in [place].  She did remember her catheter coming out at home.  She does not recall how or why she ended up in the ER however.  Her main complaint is abdominal discomfort.  She currently denies chest pain or shortness of breath.  No reported nausea or vomiting.  She is neuro intact.  She was given of her cc LR and a dose of Zosyn.  We have been asked to admit for further care.        Review of Systems   Otherwise complete ROS reviewed and negative except as mentioned in the HPI.    Past Medical History:   Past Medical History:   Diagnosis Date    Anxiety     Arthritis     Bronchitis     Cancer     uterine    Chronic pain     Depression     Disease of thyroid gland     Fibromyalgia     GERD (gastroesophageal reflux disease)     Headache     History of transfusion     AS     Hyperlipidemia     Hypertension     Incontinence     Insomnia     Leg pain     Lumbar stenosis     Migraines     Peptic ulcer     Restless legs     Sleep apnea     NO C-PAP    UTI (urinary tract infection)     Vaginal bleeding      Past Surgical History:  Past Surgical History:   Procedure Laterality Date    BLADDER REPAIR      MESH HAD TO BE REMOVED IN     BREAST BIOPSY Right 2017    benign    BREAST CYST EXCISION Left     CARDIAC CATHETERIZATION      CARPAL TUNNEL RELEASE      CATARACT EXTRACTION W/ INTRAOCULAR LENS  IMPLANT, BILATERAL      CHOLECYSTECTOMY WITH INTRAOPERATIVE CHOLANGIOGRAM N/A 2023    Procedure: CHOLECYSTECTOMY LAPAROSCOPIC INTRAOPERATIVE CHOLANGIOGRAM;  Surgeon: Gerardo Arciniega MD;  Location: [facility] OR;  Service: General;  Laterality: N/A;    COLONOSCOPY      COLONOSCOPY N/A 10/01/2021    Procedure: COLONOSCOPY WITH ANESTHESIA;  Surgeon: Tom Velasco DO;  Location: [facility] ENDOSCOPY;  Service: Gastroenterology;  Laterality: N/A;  pre: change in bowel habits  post: diverticulosis.  hemorrhoids.   Olivia Mora APRN        CYSTECTOMY      D & C HYSTEROSCOPY N/A 11/06/2017    Procedure: DILATATION AND CURETTAGE HYSTEROSCOPY;  Surgeon: Shasta Madrigal MD;  Location: [facility] OR;  Service:     DILATION AND CURETTAGE, DIAGNOSTIC / THERAPEUTIC  2008    ENDOSCOPY  09/23/2010    Short segment of Arriola's,Moderate chroninc esophagogastritis and negative H.pylori    ENDOSCOPY N/A 09/25/2017    Procedure: ESOPHAGOGASTRODUODENOSCOPY WITH ANESTHESIA;  Surgeon: Tom Velasco DO;  Location: [facility] ENDOSCOPY;  Service:     EYE SURGERY      RETINA    HEMORRHOIDECTOMY SIGMOIDOSCOPY N/A 3/21/2023    Procedure: HEMORRHOIDECTOMY WITH EXAM UNDER ANESTHESIA;  Surgeon: Holly Chavez MD;  Location: [facility] OR;  Service: General;  Laterality: N/A;    HYSTERECTOMY  12/20/2017    ORIF TIBIA/FIBULA FRACTURES Left 2000    TRANSVAGINAL TAPING SUSPENSION N/A 11/06/2017    Procedure: VAGINAL MESH REVISION;  Surgeon: Shasta Madrigal MD;  Location: [facility] OR;  Service:     VAGINAL MESH REVISION  2013     Social History:  reports that she has never smoked. She has never used smokeless tobacco. She reports that she does not currently use alcohol. She reports that she does not use drugs.    Family History: family history includes Cancer in her paternal grandmother; Diabetes in her mother and sister; Lung cancer in her paternal grandfather; Lymphoma in her brother; Multiple myeloma in her mother; Ovarian cancer in her paternal aunt; Prostate cancer in her brother; Stroke in her father.       Allergies:  Allergies   Allergen Reactions    Ropinirole Hcl Shortness Of Breath    Codeine Itching and Mental Status Change    Definity [Perflutren Lipid Microsphere] Other (See Comments)     Severe back pain    Ambien [Zolpidem] Other (See Comments)     HYPER     Eszopiclone Other (See Comments)     MAKES PT HYPER     Pregabalin Dizziness    Tizanidine Other (See Comments)     Terrible nightmares       Medications:  Prior to  Admission medications    Medication Sig Start Date End Date Taking? Authorizing Provider   acetaminophen (TYLENOL) 325 MG tablet Take 2 tablets by mouth Every 6 (Six) Hours As Needed for Mild Pain.    Tamiko Gaviria MD   amLODIPine (NORVASC) 5 MG tablet Take 1 tablet by mouth Daily. 10/15/24   Abdirahman Schroeder MD   atorvastatin (LIPITOR) 40 MG tablet Take 1 tablet by mouth Daily.    Tamiko Gaviria MD   carvedilol (COREG) 12.5 MG tablet Take 1 tablet by mouth 2 (Two) Times a Day With Meals. 8/28/19   Tamiko Gaviria MD   donepezil (ARICEPT) 10 MG tablet Take 1 tablet by mouth Every Night. 10/18/22   Tamiko Gaviria MD   ergocalciferol (ERGOCALCIFEROL) 82480 units capsule Take 1 capsule by mouth 1 (One) Time Per Week. Saturday 4/17/19   Tamiko Gaviria MD   lansoprazole (PREVACID) 30 MG capsule Take 1 capsule by mouth Daily.    Tamiko Gaviria MD   levothyroxine (SYNTHROID, LEVOTHROID) 50 MCG tablet Take 1 tablet by mouth Daily.    Tamiko Gaviria MD   naloxone (NARCAN) 4 MG/0.1ML nasal spray 1 spray into the nostril(s) as directed by provider As Needed for Opioid Reversal.    Tamiko Gaviria MD   polyethylene glycol 17 g packet Take 17 g by mouth Daily. 10/15/24   Abdirahman Schroeder MD   sennosides-docusate (PERICOLACE) 8.6-50 MG per tablet Take 2 tablets by mouth 2 (Two) Times a Day. 10/15/24   Abdirahman Schroeder MD   sertraline (ZOLOFT) 25 MG tablet Take 1 tablet by mouth Daily.    Tamiko Gaviria MD   SUMAtriptan (IMITREX) 50 MG tablet Take 1 tablet by mouth Daily As Needed for Migraine. Take one tablet at onset of headache. May repeat dose one time in 2 hours if headache not relieved.    Tamiko Gaviria MD     I have utilized all available immediate resources to obtain, update, or review the patient's current medications (including all prescriptions, over-the-counter products, herbals, cannabis/cannabidiol products, and  "vitamin/mineral/dietary (nutritional) supplements).    Objective     Vital Signs: /59 (BP Location: Left arm, Patient Position: Lying)   Pulse 96   Temp 97.1 °F (36.2 °C) (Axillary)   Resp 16   Ht 157.5 cm (62\")   Wt 70.3 kg (155 lb)   LMP  (LMP Unknown)   SpO2 100%   BMI 28.35 kg/m²   Physical Exam   GEN: sleeping but easily awoken, interactive, answering questions, speech fluent, NAD. Appears chronically ill/frail  HEENT: EOMI, Anicteric, Trachea midline, MMD  Lungs:  no wheezing/rales/rhonchi  Heart: RRR, +S1/s2, no rub  ABD: soft, non-distended, +BS, no guarding/rebound  Extremities: atraumatic, no cyanosis, no significant pitting LE edema  Skin: no petechiae   Neuro: AAOx2, WHITESIDE, speech fluent but slow, no obvious focal deficits, gait not tested  Psych: flat affect        Results Reviewed:  Lab Results (last 24 hours)       Procedure Component Value Units Date/Time    Blood Culture - Blood, Arm, Left [[id number]] Collected: 11/12/24 1453    Specimen: Blood from Arm, Left Updated: 11/12/24 1503    Urinalysis With Culture If Indicated - Straight Cath [[id number]]  (Abnormal) Collected: 11/12/24 1436    Specimen: Urine from Straight Cath Updated: 11/12/24 1456     Color, UA Yellow     Appearance, UA Turbid     pH, UA 5.5     Specific Gravity, UA 1.016     Glucose, UA Negative     Ketones, UA 15 mg/dL (1+)     Bilirubin, UA Negative     Blood, UA Large (3+)     Protein,  mg/dL (2+)     Leuk Esterase, UA Large (3+)     Nitrite, UA Positive     Urobilinogen, UA 1.0 E.U./dL    Narrative:      In absence of clinical symptoms, the presence of pyuria, bacteria, and/or nitrites on the urinalysis result does not correlate with infection.    Urinalysis, Microscopic Only - Straight Cath [[id number]]  (Abnormal) Collected: 11/12/24 1436    Specimen: Urine from Straight Cath Updated: 11/12/24 1456     RBC, UA 21-50 /HPF      WBC, UA Too Numerous to Count /HPF      Bacteria, UA 4+ /HPF      Squamous Epithelial " Cells, UA 0-2 /HPF      Yeast, UA Small/1+ Yeast /HPF      Hyaline Casts, UA None Seen /LPF      Methodology Manual Light Microscopy    Urine Culture - Urine, Straight Cath [[id number]] Collected: 11/12/24 1436    Specimen: Urine from Straight Cath Updated: 11/12/24 1456    Magnesium [[id number]]  (Normal) Collected: 11/12/24 1258    Specimen: Blood Updated: 11/12/24 1449     Magnesium 2.1 mg/dL     Blood Culture - Blood, Arm, Left [[id number]] Collected: 11/12/24 1359    Specimen: Blood from Arm, Left Updated: 11/12/24 1408    Comprehensive Metabolic Panel [[id number]]  (Abnormal) Collected: 11/12/24 1258    Specimen: Blood Updated: 11/12/24 1334     Glucose 187 mg/dL      BUN 42 mg/dL      Creatinine 2.46 mg/dL      Sodium 142 mmol/L      Potassium 3.3 mmol/L      Comment: Slight hemolysis detected by analyzer. Result may be falsely elevated.        Chloride 109 mmol/L      CO2 17.0 mmol/L      Calcium 10.7 mg/dL      Total Protein 7.3 g/dL      Albumin 3.3 g/dL      ALT (SGPT) 6 U/L      AST (SGOT) 9 U/L      Alkaline Phosphatase 93 U/L      Total Bilirubin 0.3 mg/dL      Globulin 4.0 gm/dL      A/G Ratio 0.8 g/dL      BUN/Creatinine Ratio 17.1     Anion Gap 16.0 mmol/L      eGFR 19.1 mL/min/1.73     Narrative:      GFR Normal >60  Chronic Kidney Disease <60  Kidney Failure <15    The GFR formula is only valid for adults with stable renal function between ages 18 and 70.    Lactic Acid, Plasma [[id number]]  (Normal) Collected: 11/12/24 1258    Specimen: Blood Updated: 11/12/24 1330     Lactate 1.8 mmol/L     Lipase [[id number]]  (Abnormal) Collected: 11/12/24 1258    Specimen: Blood Updated: 11/12/24 1329     Lipase 10 U/L     [place] Draw [[id number]] Collected: 11/12/24 1258    Specimen: Blood Updated: 11/12/24 1315    Narrative:      The following orders were created for panel order [place] Draw.  Procedure                               Abnormality         Status                     ---------                                -----------         ------                     Green Top (Gel)[[id number]]                                  Final result               Lavender Top[[id number]]                                     Final result               Red Top[[id number]]                                          Final result               Light Blue Top[[id number]]                                   Final result                 Please view results for these tests on the individual orders.    Green Top (Gel) [[id number]] Collected: 11/12/24 1258    Specimen: Blood Updated: 11/12/24 1315     Extra Tube Hold for add-ons.     Comment: Auto resulted.       Lavender Top [[id number]] Collected: 11/12/24 1258    Specimen: Blood Updated: 11/12/24 1315     Extra Tube hold for add-on     Comment: Auto resulted       Red Top [[id number]] Collected: 11/12/24 1258    Specimen: Blood Updated: 11/12/24 1315     Extra Tube Hold for add-ons.     Comment: Auto resulted.       Light Blue Top [[id number]] Collected: 11/12/24 1258    Specimen: Blood Updated: 11/12/24 1315     Extra Tube Hold for add-ons.     Comment: Auto resulted       CBC & Differential [[id number]]  (Abnormal) Collected: 11/12/24 1258    Specimen: Blood Updated: 11/12/24 1312    Narrative:      The following orders were created for panel order CBC & Differential.  Procedure                               Abnormality         Status                     ---------                               -----------         ------                     CBC Auto Differential[[id number]]        Abnormal            Final result                 Please view results for these tests on the individual orders.    CBC Auto Differential [[id number]]  (Abnormal) Collected: 11/12/24 1258    Specimen: Blood Updated: 11/12/24 1312     WBC 12.11 10*3/mm3      RBC 3.01 10*6/mm3      Hemoglobin 8.8 g/dL      Hematocrit 27.7 %      MCV 92.0 fL      MCH 29.2 pg      MCHC 31.8 g/dL      RDW 16.0 %      RDW-SD 53.1 fl      MPV 10.5 fL       Platelets 175 10*3/mm3      Neutrophil % 89.7 %      Lymphocyte % 5.0 %      Monocyte % 4.5 %      Eosinophil % 0.0 %      Basophil % 0.1 %      Immature Grans % 0.7 %      Neutrophils, Absolute 10.85 10*3/mm3      Lymphocytes, Absolute 0.61 10*3/mm3      Monocytes, Absolute 0.55 10*3/mm3      Eosinophils, Absolute 0.00 10*3/mm3      Basophils, Absolute 0.01 10*3/mm3      Immature Grans, Absolute 0.09 10*3/mm3      nRBC 0.0 /100 WBC           Imaging Results (Last 24 Hours)       Procedure Component Value Units Date/Time    CT Abdomen Pelvis Without Contrast [[id number]] Collected: 11/12/24 1530     Updated: 11/12/24 1539    Narrative:      EXAM: CT ABDOMEN PELVIS WO CONTRAST-      DATE: 11/12/2024 2:12 PM     HISTORY: Abdominal pain       COMPARISON: 10/10/2024.     DOSE LENGTH PRODUCT: 449.44 mGy.cm Automatic exposure control was  utilized to make radiation dose as low as reasonably achievable.     TECHNIQUE: Noncontrast axial images of the abdomen and pelvis obtained  with multiplanar reformats.     FINDINGS:  VISUALIZED CHEST: There is mitral valve calcification. No pericardial or  pleural effusion is identified. Lung bases are grossly clear.     LIVER/BILE DUCTS: There are clips from cholecystectomy. Unenhanced  hepatic parenchyma is unremarkable. Bile ducts are unremarkable.     KIDNEYS/URETERS: Left hydronephrosis has resolved. Right hydronephrosis  has improved and is now mild. No renal or ureteral calculi are  identified.     ADRENAL: Unremarkable.        SPLEEN: Unremarkable.     PANCREAS: Unremarkable.     MESENTERY: No mesenteric or retroperitoneal lymphadenopathy is seen. No  free fluid or inflammatory changes are identified.     VASCULATURE: There is calcification of the abdominal aorta without  aneurysm.     GI TRACT: There is no bowel obstruction. There is mild stool in the  colon but this has improved.     PELVIS: There is moderate stool in the rectum which is unchanged.  Patient is status post  hysterectomy. Urinary bladder is unremarkable.  Pelvic portion of the GI tract including appendix is otherwise  unremarkable. No pelvic lymphadenopathy or free fluid is seen.     SOFT TISSUES: A small umbilical hernia contains fat.     BONES: No acute or aggressive bony lesion.           Impression:      1. Resolved left hydronephrosis and improved right hydronephrosis.  2. Resolved small bilateral pleural effusions and atelectasis.  3. Improved stool in the colon with stable moderate stool at the rectum.        This report was signed and finalized on 11/12/2024 3:36 PM by Sung Chavez.             I have personally reviewed and interpreted the radiology studies and ECG obtained at time of admission.     Assessment / Plan   Assessment:   Active Hospital Problems    Diagnosis     **Acute renal failure superimposed on stage 3 chronic kidney disease     Acute cystitis     Hypercalcemia     Dementia     Chronic constipation     Anemia     Essential hypertension        Treatment Plan  The patient will be admitted to my service here at [facility].     #1 acute on chronic renal failure -patient was discharged from the hospital with a creatinine of 1.2 and a GFR of 44.  Presents back in with a creatinine of 2.46 and GFR of 19.  Likely in the setting of volume depletion and infection.  CT of the abdomen pelvis shows resolved left hydronephrosis and proved right hydronephrosis from prior.  That being said Smith catheter is now out and will likely need to be replaced.  IV fluid resuscitation and treat infection.  Urology consult.  Repeat labs in the morning.  Avoid nephrotoxic meds.    #2 urinary retention -patient previously with urinary retention and Smith catheter in place from last hospital stay.  Was supposed to follow-up with urology.  Does not appear that office visit ever took place.  Again as noted above imaging shows resolved left-sided hydro but some persistent improving right-sided hydro.  Patient  traumatically pulled out her Smith catheter with bulb intact at home.  Has dried blood in her groin.  Will ask urology to evaluate    #3 acute cystitis -last hospital stay urine grew Morganella but blood grew E. coli.  Both were sensitive to ceftriaxone.  Will start ceftriaxone follow-up blood and urine cultures.  Patient does not appear septic.  Afebrile.  Normal lactic acid.  Not tacky or hypotensive.    #4 hypercalcemia -10.7.  Likely from volume depletion.  Resuscitated with IV normal saline and recheck in the morning.  She is on ergocalciferol at home and this would need to be discontinued if calcium does not improve with fluids.    #5 chronic constipation -CT with only moderate stool in the rectum.  Continue bowel regimen.    #6 abdominal pain -pelvic region.  Suspect from UTI and Smith catheter removal.  CT abdomen pelvis really did not have any significant acute issues.    #7 essential hypertension -pressures 150s on arrival.  Will start back dose of Coreg.  Will monitor blood pressures with infection and resume other meds as appropriate.    #8 dementia -report of care.  Resume Zoloft and Aricept.    #9 hyperlipidemia -statin when able to take p.o.    Medical Decision Making  Number and Complexity of problems: 3 acute, 2 acute on chronic, multiple chronic  Differential Diagnosis: as above    Conditions and Status        New to me, interactive, no obvious focal deficits, seems dry but not toxic/septic.      [facility] Data  External documents reviewed: none  Cardiac tracing (EKG, telemetry) interpretation: none done on arrival  Radiology interpretation: reports reviewed  Labs reviewed: as above  Any tests that were considered but not ordered: none     Decision rules/scores evaluated (example [id number], [place], etc): none     Discussed with: patient, nursing     Care Planning  Shared decision making: no family at bedside, will try to reach out to spouse  Code status and discussions: Full code - pt pt, does have  some dementia but that was also listed code status last hospital stay. Will confirm with family when able    Disposition  Social Determinants of Health that impact treatment or disposition: none  Estimated length of stay is 2+ days.     I confirmed that the patient's advanced care plan is present, code status is documented, and a surrogate decision maker is listed in the patient's medical record.     The patient's surrogate decision maker is her     The patient was seen and examined by me on 11/12/24 at 4:45 PM.    Electronically signed by Eleazar Chavarria DO, 11/12/24, 17:41 CST.

## 2024-11-12 NOTE — ED PROVIDER NOTES
Subjective   History of Present Illness  84-year-old female who presents emergency department via EMS.  She is currently living at home.  She pulled out her Smith catheter with the bulb intact.  Staff noted that the patient had a lot of stool around the bulb of her Smith catheter.  The patient is moaning.  She does tell me that she just twisted around in the bed and that is how the Smith catheter came out.  She has dark blood crusted in her groin area.  She does complain of lower and upper abdominal pain.    Was noted by nursing staff that the patient had a cardiac arrest recently.  Notes are reviewed and noted below.    Date of Admission: 10/5/2024  Date of Discharge:  10/15/2024  Primary Care Physician: Provider, No Known     Presenting Problem/History of Present Illness:  Altered mental status; weakness     Final Discharge Diagnoses:       Active Hospital Problems     Diagnosis      **Sepsis due to urinary tract infection      Acute urinary retention      Cardiopulmonary arrest      E coli bacteremia      Anemia requiring transfusions      Bilateral hydronephrosis      Chronic constipation      Anemia      Acute renal failure superimposed on stage 3 chronic kidney disease      Review of Systems   Gastrointestinal:  Positive for abdominal pain.       Past Medical History:   Diagnosis Date    Anxiety     Arthritis     Bronchitis     Cancer     uterine    Chronic pain     Depression     Disease of thyroid gland     Fibromyalgia     GERD (gastroesophageal reflux disease)     Headache     History of transfusion     AS     Hyperlipidemia     Hypertension     Incontinence     Insomnia     Leg pain     Lumbar stenosis     Migraines     Peptic ulcer     Restless legs     Sleep apnea     NO C-PAP    UTI (urinary tract infection)     Vaginal bleeding        Allergies   Allergen Reactions    Ropinirole Hcl Shortness Of Breath    Codeine Itching and Mental Status Change    Definity [Perflutren Lipid Microsphere] Other  (See Comments)     Severe back pain    Ambien [Zolpidem] Other (See Comments)     HYPER     Eszopiclone Other (See Comments)     MAKES PT HYPER     Pregabalin Dizziness    Tizanidine Other (See Comments)     Terrible nightmares       Past Surgical History:   Procedure Laterality Date    BLADDER REPAIR  2011    MESH HAD TO BE REMOVED IN 2013    BREAST BIOPSY Right 2017    benign    BREAST CYST EXCISION Left 1982    CARDIAC CATHETERIZATION      CARPAL TUNNEL RELEASE      CATARACT EXTRACTION W/ INTRAOCULAR LENS  IMPLANT, BILATERAL      CHOLECYSTECTOMY WITH INTRAOPERATIVE CHOLANGIOGRAM N/A 9/7/2023    Procedure: CHOLECYSTECTOMY LAPAROSCOPIC INTRAOPERATIVE CHOLANGIOGRAM;  Surgeon: Gerardo Arciniega MD;  Location: [facility] OR;  Service: General;  Laterality: N/A;    COLONOSCOPY      COLONOSCOPY N/A 10/01/2021    Procedure: COLONOSCOPY WITH ANESTHESIA;  Surgeon: Tom Velasco DO;  Location: [facility] ENDOSCOPY;  Service: Gastroenterology;  Laterality: N/A;  pre: change in bowel habits  post: diverticulosis. hemorrhoids.   Olivia Mora APRN        CYSTECTOMY      D & C HYSTEROSCOPY N/A 11/06/2017    Procedure: DILATATION AND CURETTAGE HYSTEROSCOPY;  Surgeon: Shasta Madrigal MD;  Location: [facility] OR;  Service:     DILATION AND CURETTAGE, DIAGNOSTIC / THERAPEUTIC  2008    ENDOSCOPY  09/23/2010    Short segment of Arriola's,Moderate chroninc esophagogastritis and negative H.pylori    ENDOSCOPY N/A 09/25/2017    Procedure: ESOPHAGOGASTRODUODENOSCOPY WITH ANESTHESIA;  Surgeon: Tom Velasco DO;  Location: [facility] ENDOSCOPY;  Service:     EYE SURGERY      RETINA    HEMORRHOIDECTOMY SIGMOIDOSCOPY N/A 3/21/2023    Procedure: HEMORRHOIDECTOMY WITH EXAM UNDER ANESTHESIA;  Surgeon: Holly Chavez MD;  Location: [facility] OR;  Service: General;  Laterality: N/A;    HYSTERECTOMY  12/20/2017    ORIF TIBIA/FIBULA FRACTURES Left 2000    TRANSVAGINAL TAPING SUSPENSION N/A 11/06/2017    Procedure: VAGINAL MESH REVISION;   Surgeon: Shasta Madrigal MD;  Location:  [facility] OR;  Service:     VAGINAL MESH REVISION  2013       Family History   Problem Relation Age of Onset    Diabetes Mother     Multiple myeloma Mother     Stroke Father     Diabetes Sister     Prostate cancer Brother     Lymphoma Brother         NHL    Ovarian cancer Paternal Aunt     Cancer Paternal Grandmother         metastatic    Lung cancer Paternal Grandfather     Colon cancer Neg Hx     Esophageal cancer Neg Hx     Breast cancer Neg Hx        Social History     Socioeconomic History    Marital status:    Tobacco Use    Smoking status: Never    Smokeless tobacco: Never   Vaping Use    Vaping status: Never Used   Substance and Sexual Activity    Alcohol use: Not Currently     Comment: occasional    Drug use: No    Sexual activity: Defer           Objective   Physical Exam  Vitals reviewed.   Constitutional:       Comments: Odorous of urine.   HENT:      Head: Normocephalic and atraumatic.      Nose: Nose normal.   Eyes:      Conjunctiva/sclera: Conjunctivae normal.   Cardiovascular:      Rate and Rhythm: Normal rate and regular rhythm.      Heart sounds: Normal heart sounds.   Pulmonary:      Effort: Pulmonary effort is normal.      Breath sounds: Normal breath sounds.   Abdominal:      General: Bowel sounds are normal.      Palpations: Abdomen is soft.      Tenderness: There is generalized abdominal tenderness.      Hernia: No hernia is present.   Genitourinary:     Vagina: No signs of injury.      Comments: Although the patient has blood crusted in her groin and suprapubic area.  I do not appreciate any overt vaginal tears or bleeding from the vaginal area.  Musculoskeletal:         General: No tenderness.      Cervical back: Normal range of motion and neck supple.   Skin:     General: Skin is warm and dry.      Coloration: Skin is not cyanotic or mottled.   Neurological:      General: No focal deficit present.      Mental Status: She is alert.      Cranial  Nerves: No cranial nerve deficit.      Comments: Patient is moaning.  She is able to answer simple questions.   Psychiatric:         Mood and Affect: Mood normal. Mood is not anxious.         Procedures       Lab Results (last 24 hours)       Procedure Component Value Units Date/Time    CBC & Differential [[id number]]  (Abnormal) Collected: 11/12/24 1258    Specimen: Blood Updated: 11/12/24 1312    Narrative:      The following orders were created for panel order CBC & Differential.  Procedure                               Abnormality         Status                     ---------                               -----------         ------                     CBC Auto Differential[[id number]]        Abnormal            Final result                 Please view results for these tests on the individual orders.    Comprehensive Metabolic Panel [[id number]]  (Abnormal) Collected: 11/12/24 1258    Specimen: Blood Updated: 11/12/24 1334     Glucose 187 mg/dL      BUN 42 mg/dL      Creatinine 2.46 mg/dL      Sodium 142 mmol/L      Potassium 3.3 mmol/L      Comment: Slight hemolysis detected by analyzer. Result may be falsely elevated.        Chloride 109 mmol/L      CO2 17.0 mmol/L      Calcium 10.7 mg/dL      Total Protein 7.3 g/dL      Albumin 3.3 g/dL      ALT (SGPT) 6 U/L      AST (SGOT) 9 U/L      Alkaline Phosphatase 93 U/L      Total Bilirubin 0.3 mg/dL      Globulin 4.0 gm/dL      A/G Ratio 0.8 g/dL      BUN/Creatinine Ratio 17.1     Anion Gap 16.0 mmol/L      eGFR 19.1 mL/min/1.73     Narrative:      GFR Normal >60  Chronic Kidney Disease <60  Kidney Failure <15    The GFR formula is only valid for adults with stable renal function between ages 18 and 70.    Lipase [[id number]]  (Abnormal) Collected: 11/12/24 1258    Specimen: Blood Updated: 11/12/24 1329     Lipase 10 U/L     Lactic Acid, Plasma [[id number]]  (Normal) Collected: 11/12/24 1258    Specimen: Blood Updated: 11/12/24 1330     Lactate 1.8 mmol/L     CBC Auto  Differential [[id number]]  (Abnormal) Collected: 11/12/24 1258    Specimen: Blood Updated: 11/12/24 1312     WBC 12.11 10*3/mm3      RBC 3.01 10*6/mm3      Hemoglobin 8.8 g/dL      Hematocrit 27.7 %      MCV 92.0 fL      MCH 29.2 pg      MCHC 31.8 g/dL      RDW 16.0 %      RDW-SD 53.1 fl      MPV 10.5 fL      Platelets 175 10*3/mm3      Neutrophil % 89.7 %      Lymphocyte % 5.0 %      Monocyte % 4.5 %      Eosinophil % 0.0 %      Basophil % 0.1 %      Immature Grans % 0.7 %      Neutrophils, Absolute 10.85 10*3/mm3      Lymphocytes, Absolute 0.61 10*3/mm3      Monocytes, Absolute 0.55 10*3/mm3      Eosinophils, Absolute 0.00 10*3/mm3      Basophils, Absolute 0.01 10*3/mm3      Immature Grans, Absolute 0.09 10*3/mm3      nRBC 0.0 /100 WBC     Magnesium [[id number]]  (Normal) Collected: 11/12/24 1258    Specimen: Blood Updated: 11/12/24 1449     Magnesium 2.1 mg/dL     Blood Culture - Blood, Arm, Left [[id number]] Collected: 11/12/24 1359    Specimen: Blood from Arm, Left Updated: 11/12/24 1408    Urinalysis With Culture If Indicated - Straight Cath [[id number]]  (Abnormal) Collected: 11/12/24 1436    Specimen: Urine from Straight Cath Updated: 11/12/24 1456     Color, UA Yellow     Appearance, UA Turbid     pH, UA 5.5     Specific Gravity, UA 1.016     Glucose, UA Negative     Ketones, UA 15 mg/dL (1+)     Bilirubin, UA Negative     Blood, UA Large (3+)     Protein,  mg/dL (2+)     Leuk Esterase, UA Large (3+)     Nitrite, UA Positive     Urobilinogen, UA 1.0 E.U./dL    Narrative:      In absence of clinical symptoms, the presence of pyuria, bacteria, and/or nitrites on the urinalysis result does not correlate with infection.    Urinalysis, Microscopic Only - Straight Cath [[id number]]  (Abnormal) Collected: 11/12/24 1436    Specimen: Urine from Straight Cath Updated: 11/12/24 1456     RBC, UA 21-50 /HPF      WBC, UA Too Numerous to Count /HPF      Bacteria, UA 4+ /HPF      Squamous Epithelial Cells, UA 0-2 /HPF       Yeast, UA Small/1+ Yeast /HPF      Hyaline Casts, UA None Seen /LPF      Methodology Manual Light Microscopy    Urine Culture - Urine, Straight Cath [[id number]] Collected: 11/12/24 1436    Specimen: Urine from Straight Cath Updated: 11/12/24 1456    Blood Culture - Blood, Arm, Left [[id number]] Collected: 11/12/24 1453    Specimen: Blood from Arm, Left Updated: 11/12/24 1503          CT Abdomen Pelvis Without Contrast   Final Result   1. Resolved left hydronephrosis and improved right hydronephrosis.   2. Resolved small bilateral pleural effusions and atelectasis.   3. Improved stool in the colon with stable moderate stool at the rectum.           This report was signed and finalized on 11/12/2024 3:36 PM by Sung Chavez.                ED Course  ED Course as of 11/12/24 1627 Tue Nov 12, 2024   1625 Hospitalist agreeable to admission.  [AJ]      ED Course User Index  [AJ] Ancelmo Queen DO                    No data recorded                             Medical Decision Making  Differential diagnosis includes sepsis, UTI, bowel obstruction    Amount and/or Complexity of Data Reviewed  Labs: ordered.     Details: CBC CMP blood cultures lactic UA  Radiology: ordered.     Details: CT abdomen and pelvis    Risk  Prescription drug management.        Final diagnoses:   TOMÁS (acute kidney injury)   Acute cystitis without hematuria       ED Disposition  ED Disposition       ED Disposition   Decision to Admit    Condition   --    Comment   Level of Care: Med/Surg [1]   Diagnosis: TOMÁS (acute kidney injury) [[id number]]   Admitting Physician: SANTA KOENIG [[id number]]                 No follow-up provider specified.       Medication List      No changes were made to your prescriptions during this visit.            Ancelmo Queen DO  11/12/24 1251       Ancelmo Queen DO  11/12/24 1627

## 2024-11-12 NOTE — ED NOTES
Nursing report ED to floor  Jennifer Gomes  84 y.o.  female    HPI:   Chief Complaint   Patient presents with    Abdominal Pain    Weakness - Generalized       Admitting doctor:   Eleazar Chavarria DO    Consulting provider(s):  Consults       Date and Time Order Name Status Description    11/12/2024  4:54 PM Inpatient Urology Consult      10/7/2024  2:04 PM Inpatient Urology Consult Completed              Admitting diagnosis:   The primary encounter diagnosis was TOMÁS (acute kidney injury). A diagnosis of Acute cystitis without hematuria was also pertinent to this visit.    Code status:   Current Code Status       Date Active Code Status Order ID Comments User Context       Prior            Allergies:   Ropinirole hcl, Codeine, Definity [perflutren lipid microsphere], Ambien [zolpidem], Eszopiclone, Pregabalin, and Tizanidine    Intake and Output    Intake/Output Summary (Last 24 hours) at 11/12/2024 1724  Last data filed at 11/12/2024 1640  Gross per 24 hour   Intake 100 ml   Output --   Net 100 ml       Weight:       11/12/24  1241   Weight: 70.3 kg (155 lb)       Most recent vitals:   Vitals:    11/12/24 1346 11/12/24 1424 11/12/24 1559 11/12/24 1716   BP: 162/62 (!) 128/102 137/56 136/42   BP Location:       Patient Position:       Pulse: 94 97 94 95   Resp:       Temp:       TempSrc:       SpO2: 94% 95% 100% 97%   Weight:       Height:         Oxygen Therapy: .    Active LDAs/IV Access:   Lines, Drains & Airways       Active LDAs       Name Placement date Placement time Site Days    Peripheral IV 11/12/24 1258 Right Antecubital 11/12/24  1258  Antecubital  less than 1    Peripheral IV 11/12/24 1641 Anterior;Left;Upper Arm 11/12/24  1641  Arm  less than 1    Urethral Catheter Non-latex 18 Fr. 10/10/24  1045  -- 33                    Labs (abnormal labs have a star):   Labs Reviewed   COMPREHENSIVE METABOLIC PANEL - Abnormal; Notable for the following components:       Result Value    Glucose 187 (*)     BUN 42  (*)     Creatinine 2.46 (*)     Potassium 3.3 (*)     Chloride 109 (*)     CO2 17.0 (*)     Calcium 10.7 (*)     Albumin 3.3 (*)     Anion Gap 16.0 (*)     eGFR 19.1 (*)     All other components within normal limits    Narrative:     GFR Normal >60  Chronic Kidney Disease <60  Kidney Failure <15    The GFR formula is only valid for adults with stable renal function between ages 18 and 70.   LIPASE - Abnormal; Notable for the following components:    Lipase 10 (*)     All other components within normal limits   CBC WITH AUTO DIFFERENTIAL - Abnormal; Notable for the following components:    WBC 12.11 (*)     RBC 3.01 (*)     Hemoglobin 8.8 (*)     Hematocrit 27.7 (*)     RDW 16.0 (*)     Neutrophil % 89.7 (*)     Lymphocyte % 5.0 (*)     Monocyte % 4.5 (*)     Eosinophil % 0.0 (*)     Immature Grans % 0.7 (*)     Neutrophils, Absolute 10.85 (*)     Lymphocytes, Absolute 0.61 (*)     Immature Grans, Absolute 0.09 (*)     All other components within normal limits   URINALYSIS W/ CULTURE IF INDICATED - Abnormal; Notable for the following components:    Appearance, UA Turbid (*)     Ketones, UA 15 mg/dL (1+) (*)     Blood, UA Large (3+) (*)     Protein,  mg/dL (2+) (*)     Leuk Esterase, UA Large (3+) (*)     Nitrite, UA Positive (*)     All other components within normal limits    Narrative:     In absence of clinical symptoms, the presence of pyuria, bacteria, and/or nitrites on the urinalysis result does not correlate with infection.   URINALYSIS, MICROSCOPIC ONLY - Abnormal; Notable for the following components:    RBC, UA 21-50 (*)     WBC, UA Too Numerous to Count (*)     Bacteria, UA 4+ (*)     Yeast, UA Small/1+ Yeast (*)     All other components within normal limits   LACTIC ACID, PLASMA - Normal   MAGNESIUM - Normal   BLOOD CULTURE   BLOOD CULTURE   URINE CULTURE   RAINBOW DRAW    Narrative:     The following orders were created for panel order Ferguson Draw.  Procedure                                Abnormality         Status                     ---------                               -----------         ------                     Green Top (Gel)[[id number]]                                  Final result               Lavender Top[[id number]]                                     Final result               Red Top[[id number]]                                          Final result               Light Blue Top[[id number]]                                   Final result                 Please view results for these tests on the individual orders.   BLOOD GAS, ARTERIAL   CBC AND DIFFERENTIAL    Narrative:     The following orders were created for panel order CBC & Differential.  Procedure                               Abnormality         Status                     ---------                               -----------         ------                     CBC Auto Differential[[id number]]        Abnormal            Final result                 Please view results for these tests on the individual orders.   GREEN TOP   LAVENDER TOP   RED TOP   LIGHT BLUE TOP       Meds given in ED:   Medications   sodium chloride 0.9 % flush 10 mL (has no administration in time range)   lactated ringers bolus 500 mL (500 mL Intravenous New Bag 11/12/24 4147)   atorvastatin (LIPITOR) tablet 40 mg (has no administration in time range)   carvedilol (COREG) tablet 3.125 mg (has no administration in time range)   donepezil (ARICEPT) tablet 10 mg (has no administration in time range)   levothyroxine (SYNTHROID, LEVOTHROID) tablet 50 mcg (has no administration in time range)   sertraline (ZOLOFT) tablet 25 mg (has no administration in time range)   sodium chloride 0.9 % flush 10 mL (has no administration in time range)   sodium chloride 0.9 % flush 10 mL (has no administration in time range)   sodium chloride 0.9 % infusion 40 mL (has no administration in time range)   acetaminophen (TYLENOL) tablet 650 mg (has no administration in time range)    sennosides-docusate (PERICOLACE) 8.6-50 MG per tablet 2 tablet (has no administration in time range)     And   polyethylene glycol (MIRALAX) packet 17 g (has no administration in time range)     And   bisacodyl (DULCOLAX) EC tablet 5 mg (has no administration in time range)     And   bisacodyl (DULCOLAX) suppository 10 mg (has no administration in time range)   ondansetron (ZOFRAN) injection 4 mg (has no administration in time range)   pantoprazole (PROTONIX) EC tablet 40 mg (has no administration in time range)   cefTRIAXone (ROCEPHIN) 1,000 mg in sodium chloride 0.9 % 100 mL MBP (has no administration in time range)   potassium chloride 10 mEq in 100 mL IVPB (has no administration in time range)   sodium chloride 0.9 % infusion (has no administration in time range)   piperacillin-tazobactam (ZOSYN) 3.375 g IVPB in 100 mL NS MBP (CD) (0 g Intravenous Stopped 11/12/24 1640)     sodium chloride, 75 mL/hr         NIH Stroke Scale:       Isolation/Infection(s):  No active isolations   No active infections     COVID Testing  Collected .  Resulted .    Nursing report ED to floor:  Mental status: .  Ambulatory status: .  Precautions: .    ED nurse phone extentsion- ..

## 2024-11-12 NOTE — Clinical Note
Level of Care: Med/Surg [1]   Diagnosis: TOMÁS (acute kidney injury) [[id number]]   Admitting Physician: SANTA KOENIG [[id number]]

## 2024-11-13 LAB
ANION GAP SERPL CALCULATED.3IONS-SCNC: 15 MMOL/L (ref 5–15)
ANISOCYTOSIS BLD QL: ABNORMAL
BASOPHILS # BLD MANUAL: 0 10*3/MM3 (ref 0–0.2)
BASOPHILS NFR BLD MANUAL: 0 % (ref 0–1.5)
BUN SERPL-MCNC: 39 MG/DL (ref 8–23)
BUN/CREAT SERPL: 19.3 (ref 7–25)
CALCIUM SPEC-SCNC: 10.4 MG/DL (ref 8.6–10.5)
CHLORIDE SERPL-SCNC: 116 MMOL/L (ref 98–107)
CO2 SERPL-SCNC: 16 MMOL/L (ref 22–29)
CREAT SERPL-MCNC: 2.02 MG/DL (ref 0.57–1)
DEPRECATED RDW RBC AUTO: 52.5 FL (ref 37–54)
EGFRCR SERPLBLD CKD-EPI 2021: 24.2 ML/MIN/1.73
EOSINOPHIL # BLD MANUAL: 0.1 10*3/MM3 (ref 0–0.4)
EOSINOPHIL NFR BLD MANUAL: 1 % (ref 0.3–6.2)
ERYTHROCYTE [DISTWIDTH] IN BLOOD BY AUTOMATED COUNT: 16.2 % (ref 12.3–15.4)
GLUCOSE SERPL-MCNC: 128 MG/DL (ref 65–99)
HCT VFR BLD AUTO: 23.2 % (ref 34–46.6)
HGB BLD-MCNC: 7.7 G/DL (ref 12–15.9)
LYMPHOCYTES # BLD MANUAL: 0.4 10*3/MM3 (ref 0.7–3.1)
LYMPHOCYTES NFR BLD MANUAL: 2 % (ref 5–12)
MAGNESIUM SERPL-MCNC: 2 MG/DL (ref 1.6–2.4)
MCH RBC QN AUTO: 29.5 PG (ref 26.6–33)
MCHC RBC AUTO-ENTMCNC: 33.2 G/DL (ref 31.5–35.7)
MCV RBC AUTO: 88.9 FL (ref 79–97)
MICROCYTES BLD QL: ABNORMAL
MONOCYTES # BLD: 0.2 10*3/MM3 (ref 0.1–0.9)
NEUTROPHILS # BLD AUTO: 9.26 10*3/MM3 (ref 1.7–7)
NEUTROPHILS NFR BLD MANUAL: 87 % (ref 42.7–76)
NEUTS BAND NFR BLD MANUAL: 6 % (ref 0–5)
PHOSPHATE SERPL-MCNC: 1.8 MG/DL (ref 2.5–4.5)
PHOSPHATE SERPL-MCNC: 2.5 MG/DL (ref 2.5–4.5)
PLATELET # BLD AUTO: 140 10*3/MM3 (ref 140–450)
PMV BLD AUTO: 11 FL (ref 6–12)
POLYCHROMASIA BLD QL SMEAR: ABNORMAL
POTASSIUM SERPL-SCNC: 2.2 MMOL/L (ref 3.5–5.2)
POTASSIUM SERPL-SCNC: 3.2 MMOL/L (ref 3.5–5.2)
RBC # BLD AUTO: 2.61 10*6/MM3 (ref 3.77–5.28)
SMALL PLATELETS BLD QL SMEAR: ABNORMAL
SODIUM SERPL-SCNC: 147 MMOL/L (ref 136–145)
VARIANT LYMPHS NFR BLD MANUAL: 4 % (ref 19.6–45.3)
WBC MORPH BLD: NORMAL
WBC NRBC COR # BLD AUTO: 9.96 10*3/MM3 (ref 3.4–10.8)

## 2024-11-13 PROCEDURE — 84100 ASSAY OF PHOSPHORUS: CPT | Performed by: INTERNAL MEDICINE

## 2024-11-13 PROCEDURE — 93010 ELECTROCARDIOGRAM REPORT: CPT | Performed by: EMERGENCY MEDICINE

## 2024-11-13 PROCEDURE — 84132 ASSAY OF SERUM POTASSIUM: CPT | Performed by: FAMILY MEDICINE

## 2024-11-13 PROCEDURE — 25810000003 SODIUM CHLORIDE 0.9 % SOLUTION 250 ML FLEX CONT: Performed by: FAMILY MEDICINE

## 2024-11-13 PROCEDURE — 99221 1ST HOSP IP/OBS SF/LOW 40: CPT | Performed by: UROLOGY

## 2024-11-13 PROCEDURE — 85007 BL SMEAR W/DIFF WBC COUNT: CPT | Performed by: INTERNAL MEDICINE

## 2024-11-13 PROCEDURE — 80048 BASIC METABOLIC PNL TOTAL CA: CPT | Performed by: INTERNAL MEDICINE

## 2024-11-13 PROCEDURE — 36415 COLL VENOUS BLD VENIPUNCTURE: CPT | Performed by: INTERNAL MEDICINE

## 2024-11-13 PROCEDURE — 83735 ASSAY OF MAGNESIUM: CPT | Performed by: INTERNAL MEDICINE

## 2024-11-13 PROCEDURE — 93005 ELECTROCARDIOGRAM TRACING: CPT | Performed by: FAMILY MEDICINE

## 2024-11-13 PROCEDURE — 85025 COMPLETE CBC W/AUTO DIFF WBC: CPT | Performed by: INTERNAL MEDICINE

## 2024-11-13 PROCEDURE — 25810000003 SODIUM CHLORIDE 0.9 % SOLUTION: Performed by: INTERNAL MEDICINE

## 2024-11-13 PROCEDURE — 84100 ASSAY OF PHOSPHORUS: CPT | Performed by: FAMILY MEDICINE

## 2024-11-13 PROCEDURE — 25010000002 ENOXAPARIN PER 10 MG: Performed by: FAMILY MEDICINE

## 2024-11-13 PROCEDURE — 25010000002 CEFTRIAXONE PER 250 MG: Performed by: INTERNAL MEDICINE

## 2024-11-13 RX ORDER — DILTIAZEM HCL/D5W 125 MG/125
5-15 PLASTIC BAG, INJECTION (ML) INTRAVENOUS
Status: DISCONTINUED | OUTPATIENT
Start: 2024-11-13 | End: 2024-11-14

## 2024-11-13 RX ORDER — ENOXAPARIN SODIUM 100 MG/ML
1 INJECTION SUBCUTANEOUS EVERY 24 HOURS
Status: DISCONTINUED | OUTPATIENT
Start: 2024-11-13 | End: 2024-11-15

## 2024-11-13 RX ORDER — POTASSIUM CHLORIDE 1500 MG/1
40 TABLET, EXTENDED RELEASE ORAL ONCE
Status: COMPLETED | OUTPATIENT
Start: 2024-11-13 | End: 2024-11-13

## 2024-11-13 RX ADMIN — Medication 10 ML: at 20:44

## 2024-11-13 RX ADMIN — SERTRALINE HYDROCHLORIDE 25 MG: 50 TABLET ORAL at 08:20

## 2024-11-13 RX ADMIN — DONEPEZIL HYDROCHLORIDE 10 MG: 10 TABLET, FILM COATED ORAL at 20:43

## 2024-11-13 RX ADMIN — ENOXAPARIN SODIUM 70 MG: 100 INJECTION SUBCUTANEOUS at 14:13

## 2024-11-13 RX ADMIN — ATORVASTATIN CALCIUM 40 MG: 40 TABLET, FILM COATED ORAL at 08:21

## 2024-11-13 RX ADMIN — Medication 5 MG/HR: at 14:13

## 2024-11-13 RX ADMIN — CARVEDILOL 3.12 MG: 3.12 TABLET, FILM COATED ORAL at 17:47

## 2024-11-13 RX ADMIN — SODIUM PHOSPHATE, MONOBASIC, MONOHYDRATE AND SODIUM PHOSPHATE, DIBASIC, ANHYDROUS 15 MMOL: 142; 276 INJECTION, SOLUTION INTRAVENOUS at 08:17

## 2024-11-13 RX ADMIN — PANTOPRAZOLE SODIUM 40 MG: 40 TABLET, DELAYED RELEASE ORAL at 05:27

## 2024-11-13 RX ADMIN — LEVOTHYROXINE SODIUM 50 MCG: 0.05 TABLET ORAL at 05:27

## 2024-11-13 RX ADMIN — SODIUM CHLORIDE 75 ML/HR: 900 INJECTION, SOLUTION INTRAVENOUS at 14:13

## 2024-11-13 RX ADMIN — POTASSIUM CHLORIDE 40 MEQ: 1500 TABLET, EXTENDED RELEASE ORAL at 17:47

## 2024-11-13 RX ADMIN — DOCUSATE SODIUM 50 MG AND SENNOSIDES 8.6 MG 2 TABLET: 8.6; 5 TABLET, FILM COATED ORAL at 08:20

## 2024-11-13 RX ADMIN — SODIUM CHLORIDE 1000 MG: 900 INJECTION INTRAVENOUS at 00:54

## 2024-11-13 RX ADMIN — CARVEDILOL 3.12 MG: 3.12 TABLET, FILM COATED ORAL at 08:20

## 2024-11-13 NOTE — CASE MANAGEMENT/SOCIAL WORK
Discharge Planning Assessment  [facility]     Patient Name: Jennifer Gomes  MRN: [id number]  Today's Date: 11/13/2024    Admit Date: 11/12/2024        Discharge Needs Assessment       Row Name 11/13/24 1051       Living Environment    People in Home spouse    Name(s) of People in Home Adams Gomes  [phone]    Current Living Arrangements home    Potentially Unsafe Housing Conditions none    In the past 12 months has the electric, gas, oil, or water company threatened to shut off services in your home? No    Primary Care Provided by self;spouse/significant other    Provides Primary Care For no one, unable/limited ability to care for self    Family Caregiver if Needed child(sebastian), adult;spouse    Quality of Family Relationships helpful;involved;supportive       Resource/Environmental Concerns    Transportation Concerns none       Transportation Needs    In the past 12 months, has lack of transportation kept you from medical appointments or from getting medications? no    In the past 12 months, has lack of transportation kept you from meetings, work, or from getting things needed for daily living? No       Food Insecurity    Within the past 12 months, you worried that your food would run out before you got the money to buy more. Never true    Within the past 12 months, the food you bought just didn't last and you didn't have money to get more. Never true       Transition Planning    Patient/Family Anticipates Transition to home with family    Patient/Family Anticipated Services at Transition none    Transportation Anticipated family or friend will provide       Discharge Needs Assessment    Equipment Currently Used at Home walker, rolling;shower chair    Concerns to be Addressed discharge planning    Do you want help finding or keeping work or a job? I do not need or want help    Do you want help with school or training? For example, starting or completing job training or getting a high school diploma,  GED or equivalent No    Anticipated Changes Related to Illness none    Equipment Needed After Discharge none    Discharge Coordination/Progress Patient currently at home with spouse. Has daughter involved in her care. Recently hospitalized here at [facility] from 10/5 - 10/15. Then discharged to [facility]. At some point discharged and currently back in her home. DME in home is rolling walker and shower chair. CM/SS will follow and assist with any discharge planning or needs. Family denies any at this time.  Has PT eval ordered/ hasn't seen yet. Will watch for their recommendations.                   Discharge Plan    No documentation.                 Continued Care and Services - Admitted Since 11/12/2024    No active coordination exists for this encounter.       Selected Continued Care - Prior Encounters Includes continued care and service providers with selected services from prior encounters from 8/14/2024 to 11/13/2024      Discharged on 10/15/2024 Admission date: 10/5/2024 - Discharge disposition: Skilled Nursing Facility (DC - External)      Destination       Service Provider Services Address Phone Fax Patient Preferred    [facility] Sk[facility] Nursing [address] [place] [id number] [phone] --                             Demographic Summary    No documentation.                  Functional Status    No documentation.                  Psychosocial    No documentation.                  Abuse/Neglect    No documentation.                  Legal    No documentation.                  Substance Abuse    No documentation.                  Patient Forms    No documentation.                     Penny Uriarte RN

## 2024-11-13 NOTE — PLAN OF CARE
Goal Outcome Evaluation:  Plan of Care Reviewed With: patient        Progress: no change  Outcome Evaluation: Patient transferred from  to  442 for Afib RVR- started on a Cardizem gtt, currently infusing at 10 mg/hr. Alert and oriented, slight confusion noted. No c/o pain. Sats WNL on room air. Smith in place.

## 2024-11-13 NOTE — CONSULTS
[facility]   Consult Note    Patient Name: Jennifer Gomes  : 1940  MRN: [id number]  Primary Care Physician:  Provider, No Known  Referring Physician: Eleazar Chavarria DO  Date of admission: 2024    Consults  Subjective   Subjective     Reason for Consult/ Chief Complaint: Urinary retention with hydronephrosis    History of Present Illness  Jennifer Gomes is a 84 y.o. female previously seen by Dr. Gallegos for bilateral hydronephrosis in the setting of urinary retention.  Patient failed to follow-up as an outpatient.  Transferred to the emergency room after pulling out her Smith catheter with a bulb intact.  Patient confused and unable to give accurate history this morning.  Per nursing staff purulent appearing urine returned when catheter was placed.    Review of Systems   Constitutional:  Negative for chills and fever.   Gastrointestinal:  Negative for abdominal pain, anal bleeding and blood in stool.   Genitourinary:  Negative for dysuria and hematuria.        Personal History     Past Medical History:   Diagnosis Date    Anxiety     Arthritis     Bronchitis     Cancer     uterine    Chronic pain     Depression     Disease of thyroid gland     Fibromyalgia     GERD (gastroesophageal reflux disease)     Headache     History of transfusion     AS     Hyperlipidemia     Hypertension     Incontinence     Insomnia     Leg pain     Lumbar stenosis     Migraines     Peptic ulcer     Restless legs     Sleep apnea     NO C-PAP    UTI (urinary tract infection)     Vaginal bleeding        Past Surgical History:   Procedure Laterality Date    BLADDER REPAIR      MESH HAD TO BE REMOVED IN     BREAST BIOPSY Right 2017    benign    BREAST CYST EXCISION Left     CARDIAC CATHETERIZATION      CARPAL TUNNEL RELEASE      CATARACT EXTRACTION W/ INTRAOCULAR LENS  IMPLANT, BILATERAL      CHOLECYSTECTOMY WITH INTRAOPERATIVE CHOLANGIOGRAM N/A 2023    Procedure: CHOLECYSTECTOMY LAPAROSCOPIC  INTRAOPERATIVE CHOLANGIOGRAM;  Surgeon: Gerardo Arciniega MD;  Location: [facility] OR;  Service: General;  Laterality: N/A;    COLONOSCOPY      COLONOSCOPY N/A 10/01/2021    Procedure: COLONOSCOPY WITH ANESTHESIA;  Surgeon: Tom Velasco DO;  Location: [facility] ENDOSCOPY;  Service: Gastroenterology;  Laterality: N/A;  pre: change in bowel habits  post: diverticulosis. hemorrhoids.   Olivia Mora, APRN        CYSTECTOMY      D & C HYSTEROSCOPY N/A 11/06/2017    Procedure: DILATATION AND CURETTAGE HYSTEROSCOPY;  Surgeon: Shasta Madrigal MD;  Location: [facility] OR;  Service:     DILATION AND CURETTAGE, DIAGNOSTIC / THERAPEUTIC  2008    ENDOSCOPY  09/23/2010    Short segment of Arriola's,Moderate chroninc esophagogastritis and negative H.pylori    ENDOSCOPY N/A 09/25/2017    Procedure: ESOPHAGOGASTRODUODENOSCOPY WITH ANESTHESIA;  Surgeon: Tom Velasco DO;  Location: [facility] ENDOSCOPY;  Service:     EYE SURGERY      RETINA    HEMORRHOIDECTOMY SIGMOIDOSCOPY N/A 3/21/2023    Procedure: HEMORRHOIDECTOMY WITH EXAM UNDER ANESTHESIA;  Surgeon: Holly Chavez MD;  Location: [facility] OR;  Service: General;  Laterality: N/A;    HYSTERECTOMY  12/20/2017    ORIF TIBIA/FIBULA FRACTURES Left 2000    TRANSVAGINAL TAPING SUSPENSION N/A 11/06/2017    Procedure: VAGINAL MESH REVISION;  Surgeon: Shasta Madrigal MD;  Location: [facility] OR;  Service:     VAGINAL MESH REVISION  2013       Family History: family history includes Cancer in her paternal grandmother; Diabetes in her mother and sister; Lung cancer in her paternal grandfather; Lymphoma in her brother; Multiple myeloma in her mother; Ovarian cancer in her paternal aunt; Prostate cancer in her brother; Stroke in her father. Otherwise pertinent FHx was reviewed and not pertinent to current issue.    Social History:  reports that she has never smoked. She has never used smokeless tobacco. She reports that she does not currently use alcohol. She reports that she does not use  drugs.    Home Medications:   SUMAtriptan, acetaminophen, amLODIPine, atorvastatin, carvedilol, donepezil, ergocalciferol, lansoprazole, levothyroxine, naloxone, polyethylene glycol, sennosides-docusate, and sertraline    Allergies:  Allergies   Allergen Reactions    Ropinirole Hcl Shortness Of Breath    Codeine Itching and Mental Status Change    Definity [Perflutren Lipid Microsphere] Other (See Comments)     Severe back pain    Ambien [Zolpidem] Other (See Comments)     HYPER     Eszopiclone Other (See Comments)     MAKES PT HYPER     Pregabalin Dizziness    Tizanidine Other (See Comments)     Terrible nightmares       Objective    Objective     Vitals:  Temp:  [97.1 °F (36.2 °C)-98.2 °F (36.8 °C)] 97.9 °F (36.6 °C)  Heart Rate:  [89-97] 89  Resp:  [16] 16  BP: (128-162)/() 144/42    Physical Exam  Vitals reviewed.   Constitutional:       General: She is not in acute distress.     Appearance: She is well-developed. She is not diaphoretic.   Pulmonary:      Effort: Pulmonary effort is normal.   Abdominal:      General: There is no distension.      Palpations: Abdomen is soft. There is no mass.      Tenderness: There is no abdominal tenderness. There is no guarding or rebound.      Hernia: No hernia is present.   Skin:     General: Skin is warm and dry.   Neurological:      Mental Status: She is alert and oriented to person, place, and time.         Result Review    Result Review:  I have personally reviewed the results from the time of this admission to 11/13/2024 05:57 CST and agree with these findings:  [x]  Laboratory list / accordion  []  Microbiology  [x]  Radiology  []  EKG/Telemetry   []  Cardiology/Vascular   []  Pathology  [x]  Old records  []  Other:  Most notable findings include: White blood cell count 12.11  Hemoglobin 8.8  Platelet count 175  Creatinine 2.46 creatinine approximately 1 month ago 1.24.    CT independent review  The CT scan of the abdomen/pelvis done without contrast is available  for me to review.  Treatment recommendations require an independent review.  First I scanned the liver, spleen, and bowel pattern.  The retroperitoneum including the major vessels and lymphatic packages are briefly reviewed.  This film has been reviewed by the radiologist to determine any non-urologic abnormalities that are present.  The kidneys are closely inspected for size, symmetry, contour, parenchymal thickness, perinephric reaction, presence of calcifications, and intrarenal dilation of the collecting system.  The ureters are inspected for their course, caliber, and any calcifications.  The bladder is inspected for its thickness, size, and presence of any calcifications.  This scan shows:    The right kidney appears mild hydronephrosis improved compared to CT dated 10/10/2024    The left kidney appears normal on this non-contrasted CT scan.  The renal parenchymal is normal in thickness.  There are no solid masses or cysts.  There is no hydronephrosis.  There are no stones.      The bladder appears somewhat dilated.       Assessment & Plan   Assessment / Plan     Brief Patient Summary:  Jennifer Gomes is a 84 y.o. female who retention who removed her Smith catheter.    Active Hospital Problems:  Active Hospital Problems    Diagnosis     **Acute renal failure superimposed on stage 3 chronic kidney disease     Acute cystitis     Hypercalcemia     Dementia     Chronic constipation     Anemia     Essential hypertension      Plan:   Staff to place indwelling Smith catheter.  I will continue indwelling Smith catheter and repeat imaging within 48 hours with a renal ultrasound to ensure that the right sided hydronephrosis has improved.  Follow serial creatinines.  I do not think patient will be able to perform CIC.  May need to consider suprapubic tube for bladder drainage.    Eleazar Muhammad MD

## 2024-11-13 NOTE — PLAN OF CARE
Goal Outcome Evaluation:  Plan of Care Reviewed With: patient        Progress: no change  Outcome Evaluation: Pt admitted from the ED to room 379 with TOMÁS. Pt removed FC with bulb intact at home. Pt oriented x4. Pt sleeping in between care. NPO. Bed alarm on. safety maintained.

## 2024-11-13 NOTE — PROGRESS NOTES
"    [facility]  INPATIENT PROGRESS NOTE    Patient Name: Jennifer Gomes  Date of Admission: 11/12/2024  Today's Date: 11/13/24  Length of Stay: 1  Primary Care Physician: Provider, No Known    Subjective   Chief Complaint: Abdominal pain  HPI   84-year-old female with history of hypertension, chronic kidney disease stage III, chronic pain, dementia, hypothyroidism, GERD, hyperlipidemia, discharged from the hospital October 2024, at that time admitted for acute renal insufficiency associated to bilateral hydronephrosis.  Patient had a Smith catheter placed.    Patient was admitted 11/12/2024 after she accidentally pulled out Smith catheter.  Found to have acute renal insufficiency, urinary tract infection; I started taking care of this patient 11/13/2024; patient reports pain \"all over\", does not localize.  No family member present.  He had Smith catheter replaced yesterday.  Urology evaluated patient recommending to repeat imaging studies renal ultrasound in 48 hours.        Review of Systems   All pertinent negatives and positives are as above. All other systems have been reviewed and are negative unless otherwise stated.     Objective    Temp:  [97.1 °F (36.2 °C)-99.4 °F (37.4 °C)] 99 °F (37.2 °C)  Heart Rate:  [80-97] 80  Resp:  [16] 16  BP: (128-162)/() 134/41  Physical Exam  Constitutional:       Appearance: Alert, disoriented to situation.  No respiratory distress.  HENT:      Head: Normocephalic and atraumatic.      Nose: Nose normal.      Mouth/Throat:      Mouth: Mucous membranes are moist.   Eyes:      Extraocular Movements: Extraocular movements intact.      Conjunctiva/sclera: Conjunctivae normal.   Cardiovascular:      Rate and Rhythm: Normal rate and regular rhythm.      Pulses: Normal pulses.   Pulmonary:      Effort: No respiratory distress.      Breath sounds: Normal breath sounds. No wheezing, rhonchi or rales.   Abdominal:      General: " "Abdomen is flat. Bowel sounds are normal.      Palpations: Abdomen is soft.      Tenderness: There is no guarding or rebound.   Genitourinary.  Smith catheter in place  Extremities:  No lower extremity edema.  Skin:     Capillary Refill: Capillary refill takes less than 2 seconds.      Coloration: Skin is not jaundiced.      Findings: No rash.   Neurological:      General: No focal deficit present.      Mental Status: Patient is alert, disoriented to situation.     Sensory: No sensory deficit.      Motor: No weakness.      Coordination: Coordination normal. l.           Results Review:  I have reviewed the labs, radiology results, and diagnostic studies.    Laboratory Data:   Results from last 7 days   Lab Units 11/13/24  0441 11/12/24  1258   WBC 10*3/mm3 9.96 12.11*   HEMOGLOBIN g/dL 7.7* 8.8*   HEMATOCRIT % 23.2* 27.7*   PLATELETS 10*3/mm3 140 175        Results from last 7 days   Lab Units 11/13/24  0441 11/12/24  1258   SODIUM mmol/L 147* 142   POTASSIUM mmol/L 3.2* 3.3*   CHLORIDE mmol/L 116* 109*   CO2 mmol/L 16.0* 17.0*   BUN mg/dL 39* 42*   CREATININE mg/dL 2.02* 2.46*   CALCIUM mg/dL 10.4 10.7*   BILIRUBIN mg/dL  --  0.3   ALK PHOS U/L  --  93   ALT (SGPT) U/L  --  6   AST (SGOT) U/L  --  9   GLUCOSE mg/dL 128* 187*       Culture Data:   No results found for: \"BLOODCX\", \"URINECX\", \"WOUNDCX\", \"MRSACX\", \"RESPCX\", \"STOOLCX\"    Radiology Data:   Imaging Results (Last 24 Hours)       Procedure Component Value Units Date/Time    CT Abdomen Pelvis Without Contrast [[id number]] Collected: 11/12/24 1530     Updated: 11/12/24 1539    Narrative:      EXAM: CT ABDOMEN PELVIS WO CONTRAST-      DATE: 11/12/2024 2:12 PM     HISTORY: Abdominal pain       COMPARISON: 10/10/2024.     DOSE LENGTH PRODUCT: 449.44 mGy.cm Automatic exposure control was  utilized to make radiation dose as low as reasonably achievable.     TECHNIQUE: Noncontrast axial images of the abdomen and pelvis obtained  with multiplanar reformats.   "   FINDINGS:  VISUALIZED CHEST: There is mitral valve calcification. No pericardial or  pleural effusion is identified. Lung bases are grossly clear.     LIVER/BILE DUCTS: There are clips from cholecystectomy. Unenhanced  hepatic parenchyma is unremarkable. Bile ducts are unremarkable.     KIDNEYS/URETERS: Left hydronephrosis has resolved. Right hydronephrosis  has improved and is now mild. No renal or ureteral calculi are  identified.     ADRENAL: Unremarkable.        SPLEEN: Unremarkable.     PANCREAS: Unremarkable.     MESENTERY: No mesenteric or retroperitoneal lymphadenopathy is seen. No  free fluid or inflammatory changes are identified.     VASCULATURE: There is calcification of the abdominal aorta without  aneurysm.     GI TRACT: There is no bowel obstruction. There is mild stool in the  colon but this has improved.     PELVIS: There is moderate stool in the rectum which is unchanged.  Patient is status post hysterectomy. Urinary bladder is unremarkable.  Pelvic portion of the GI tract including appendix is otherwise  unremarkable. No pelvic lymphadenopathy or free fluid is seen.     SOFT TISSUES: A small umbilical hernia contains fat.     BONES: No acute or aggressive bony lesion.           Impression:      1. Resolved left hydronephrosis and improved right hydronephrosis.  2. Resolved small bilateral pleural effusions and atelectasis.  3. Improved stool in the colon with stable moderate stool at the rectum.        This report was signed and finalized on 11/12/2024 3:36 PM by Sung Chavez.               I have reviewed the patient's current medications.     Assessment/Plan   Assessment  Active Hospital Problems    Diagnosis     **Acute renal failure superimposed on stage 3 chronic kidney disease     Acute cystitis     Hypercalcemia     Dementia     Chronic constipation     Anemia     Essential hypertension        Acute kidney injury/renal insufficiency on chronic kidney disease stage IIIb  Atrial  fibrillation with rapid ventricular response  Right hydronephrosis  History of bilateral hydronephrosis  Urinary tract infection, gram-negative bacilli  Constipation  Hypokalemia  Hypophosphatemia  Chronic normocytic anemia      Sodium 147.  Potassium 3.2.  CO2 16  Creatinine improved from 2.46 to 2.02.  BUN 39.  Calcium 10.4.  Phosphorus 1.8    White blood cell count on admission 12,100; improved to 9900.  Hemoglobin 7.7, hematocrit 23.2.    Urine culture with more than 100,000 gram-negative bacilli growing.  Blood cultures negative so far    Treatment Plan  Ceftriaxone day #2  Follow-up final urine culture    Renal ultrasound recommended in 48 hours by urologist.  Continue Smith catheter.    Continue normal saline 75 mL/h.    Follow renal function and electrolytes.  Replace potassium and phosphorus.    Patient will be started on a Cardizem drip.  Lovenox full anticoagulation adjusted for renal function by pharmacy.  Echocardiogram requested.        Medical Decision Making  Number and Complexity of problems: More than 10, moderate to high complexity  Differential Diagnosis: See above    Conditions and Status        Condition is unchanged.     MDM Data  External documents reviewed: None  Cardiac tracing (EKG, telemetry) interpretation: Atrial fibrillation  Radiology interpretation: Radiology reports reviewed  Labs reviewed: Yes  Any tests that were considered but not ordered: No     Decision rules/scores evaluated (example [id number], Wells, etc): See chart     Discussed with: Patient and nurse     Care Planning  Shared decision making: With patient  Code status and discussions: Full code    Disposition  Social Determinants of Health that impact treatment or disposition: None  I expect the patient to be discharged to skilled facility in 2-3 days.         Electronically signed by Cuba Espitia MD, 11/13/24, 11:54 CST.

## 2024-11-13 NOTE — PROGRESS NOTES
"Pharmacy Dosing Service  Anticoagulant  Enoxaparin    Assessment/Action/Plan:  Pharmacy to dose Lovenox for Atrial fibrillation    Weight 70 kg  Principle issue: Acute renal failure superimposed on stage 3 chronic kidney disease  Est CrCl < 30    Initiate Lovenox 70 mg subQ daily.     Subjective:  Jennifer Gomes is a 84 y.o. female on Enoxaparin 70 mg SQ every 24 hours for indication of atrial fibrillation.  Objective:  [Ht: 157.5 cm (62\"); Wt: 70.3 kg (155 lb); BMI: Body mass index is 28.35 kg/m².]  Estimated Creatinine Clearance: 19.7 mL/min (A) (by C-G formula based on SCr of 2.02 mg/dL (H)).     Lab Results   Component Value Date    HGB 7.7 (L) 11/13/2024    HGB 8.8 (L) 11/12/2024    HGB 8.2 (L) 10/15/2024      Lab Results   Component Value Date     11/13/2024     11/12/2024     10/15/2024       Patel Tamayo, PharmD  11/13/24 13:07 CST     "

## 2024-11-13 NOTE — NURSING NOTE
MONITOR ROOM CALLED TO SAY PT. IS IN A-FIB HEART RATE @ 130'S-165. PT. ASYMPTOMATIC. DR. MATIAS WILL BE NOTIFIED.

## 2024-11-13 NOTE — NURSING NOTE
[facility]  INPATIENT WOUND & OSTOMY CARE    Today's Date: 11/13/24    Patient Name: Jennifer Gomes  MRN: [id number]  CSN: [id number]  PCP: Provider, No Known  Attending Provider: Cuba Espitia MD  Length of Stay: 1    Pressure injury prevention measures ordered per protocol due to patient being at risk for skin breakdown.    Apply silicone foam border dressing per protocol to sacral spine/bilateral heels for protection.  Nursing to change dressing every 3 days and PRN if soiled. Nursing is to peel back dressing with every assessment to assess skin underneath dressing. No barrier cream under dressing.     Please reach out to wound care nurse if any skin issues arise.     This document has been electronically signed by Sadie Roy RN on 11/13/2024 08:58 CST

## 2024-11-14 ENCOUNTER — APPOINTMENT (OUTPATIENT)
Dept: CARDIOLOGY | Facility: HOSPITAL | Age: 84
DRG: 684 | End: 2024-11-14
Payer: MEDICARE

## 2024-11-14 PROBLEM — I48.0 PAROXYSMAL ATRIAL FIBRILLATION: Status: ACTIVE | Noted: 2024-11-14

## 2024-11-14 LAB
ANION GAP SERPL CALCULATED.3IONS-SCNC: 10 MMOL/L (ref 5–15)
ANION GAP SERPL CALCULATED.3IONS-SCNC: 8 MMOL/L (ref 5–15)
BACTERIA SPEC AEROBE CULT: ABNORMAL
BASOPHILS # BLD AUTO: 0.01 10*3/MM3 (ref 0–0.2)
BASOPHILS NFR BLD AUTO: 0.2 % (ref 0–1.5)
BH CV ECHO MEAS - AO MAX PG: 10.8 MMHG
BH CV ECHO MEAS - AO MEAN PG: 6 MMHG
BH CV ECHO MEAS - AO ROOT DIAM: 3 CM
BH CV ECHO MEAS - AO V2 MAX: 164 CM/SEC
BH CV ECHO MEAS - AO V2 VTI: 29.3 CM
BH CV ECHO MEAS - AVA(I,D): 3 CM2
BH CV ECHO MEAS - EDV(CUBED): 50.7 ML
BH CV ECHO MEAS - EDV(MOD-SP2): 83.2 ML
BH CV ECHO MEAS - EDV(MOD-SP4): 75.4 ML
BH CV ECHO MEAS - EF(MOD-BP): 79.1 %
BH CV ECHO MEAS - EF(MOD-SP2): 80.2 %
BH CV ECHO MEAS - EF(MOD-SP4): 78.8 %
BH CV ECHO MEAS - ESV(CUBED): 12.2 ML
BH CV ECHO MEAS - ESV(MOD-SP2): 16.5 ML
BH CV ECHO MEAS - ESV(MOD-SP4): 16 ML
BH CV ECHO MEAS - FS: 37.8 %
BH CV ECHO MEAS - IVS/LVPW: 1.09 CM
BH CV ECHO MEAS - IVSD: 1.2 CM
BH CV ECHO MEAS - LA DIMENSION: 5.4 CM
BH CV ECHO MEAS - LAT PEAK E' VEL: 7.5 CM/SEC
BH CV ECHO MEAS - LV DIASTOLIC VOL/BSA (35-75): 44 CM2
BH CV ECHO MEAS - LV MASS(C)D: 138.2 GRAMS
BH CV ECHO MEAS - LV MAX PG: 9.5 MMHG
BH CV ECHO MEAS - LV MEAN PG: 5 MMHG
BH CV ECHO MEAS - LV SYSTOLIC VOL/BSA (12-30): 9.3 CM2
BH CV ECHO MEAS - LV V1 MAX: 154 CM/SEC
BH CV ECHO MEAS - LV V1 VTI: 27.8 CM
BH CV ECHO MEAS - LVIDD: 3.7 CM
BH CV ECHO MEAS - LVIDS: 2.3 CM
BH CV ECHO MEAS - LVOT AREA: 3.1 CM2
BH CV ECHO MEAS - LVOT DIAM: 2 CM
BH CV ECHO MEAS - LVPWD: 1.2 CM
BH CV ECHO MEAS - MED PEAK E' VEL: 7.2 CM/SEC
BH CV ECHO MEAS - MV A MAX VEL: 135 CM/SEC
BH CV ECHO MEAS - MV DEC SLOPE: 552 CM/SEC2
BH CV ECHO MEAS - MV DEC TIME: 0.24 SEC
BH CV ECHO MEAS - MV E MAX VEL: 121 CM/SEC
BH CV ECHO MEAS - MV E/A: 0.9
BH CV ECHO MEAS - MV MAX PG: 12.3 MMHG
BH CV ECHO MEAS - MV MEAN PG: 6 MMHG
BH CV ECHO MEAS - MV P1/2T: 96 MSEC
BH CV ECHO MEAS - MV V2 VTI: 47.4 CM
BH CV ECHO MEAS - MVA(P1/2T): 2.29 CM2
BH CV ECHO MEAS - MVA(VTI): 1.84 CM2
BH CV ECHO MEAS - RAP SYSTOLE: 3 MMHG
BH CV ECHO MEAS - RVSP: 37.3 MMHG
BH CV ECHO MEAS - SV(LVOT): 87.3 ML
BH CV ECHO MEAS - SV(MOD-SP2): 66.7 ML
BH CV ECHO MEAS - SV(MOD-SP4): 59.4 ML
BH CV ECHO MEAS - SVI(LVOT): 50.9 ML/M2
BH CV ECHO MEAS - SVI(MOD-SP2): 38.9 ML/M2
BH CV ECHO MEAS - SVI(MOD-SP4): 34.6 ML/M2
BH CV ECHO MEAS - TR MAX PG: 34.3 MMHG
BH CV ECHO MEAS - TR MAX VEL: 293 CM/SEC
BH CV ECHO MEASUREMENTS AVERAGE E/E' RATIO: 16.46
BH CV XLRA - RV BASE: 4.1 CM
BH CV XLRA - RV LENGTH: 4.5 CM
BH CV XLRA - RV MID: 2.4 CM
BUN SERPL-MCNC: 27 MG/DL (ref 8–23)
BUN SERPL-MCNC: 29 MG/DL (ref 8–23)
BUN/CREAT SERPL: 17.4 (ref 7–25)
BUN/CREAT SERPL: 18.2 (ref 7–25)
CALCIUM SPEC-SCNC: 9.4 MG/DL (ref 8.6–10.5)
CALCIUM SPEC-SCNC: 9.5 MG/DL (ref 8.6–10.5)
CHLORIDE SERPL-SCNC: 113 MMOL/L (ref 98–107)
CHLORIDE SERPL-SCNC: 114 MMOL/L (ref 98–107)
CO2 SERPL-SCNC: 17 MMOL/L (ref 22–29)
CO2 SERPL-SCNC: 19 MMOL/L (ref 22–29)
CREAT SERPL-MCNC: 1.55 MG/DL (ref 0.57–1)
CREAT SERPL-MCNC: 1.59 MG/DL (ref 0.57–1)
DEPRECATED RDW RBC AUTO: 54.6 FL (ref 37–54)
EGFRCR SERPLBLD CKD-EPI 2021: 32.3 ML/MIN/1.73
EGFRCR SERPLBLD CKD-EPI 2021: 33.3 ML/MIN/1.73
EOSINOPHIL # BLD AUTO: 0.07 10*3/MM3 (ref 0–0.4)
EOSINOPHIL NFR BLD AUTO: 1.1 % (ref 0.3–6.2)
ERYTHROCYTE [DISTWIDTH] IN BLOOD BY AUTOMATED COUNT: 16.3 % (ref 12.3–15.4)
GLUCOSE SERPL-MCNC: 115 MG/DL (ref 65–99)
GLUCOSE SERPL-MCNC: 128 MG/DL (ref 65–99)
HCT VFR BLD AUTO: 21.9 % (ref 34–46.6)
HGB BLD-MCNC: 7 G/DL (ref 12–15.9)
IMM GRANULOCYTES # BLD AUTO: 0.04 10*3/MM3 (ref 0–0.05)
IMM GRANULOCYTES NFR BLD AUTO: 0.6 % (ref 0–0.5)
LEFT ATRIUM VOLUME INDEX: 47.3 ML/M2
LEFT ATRIUM VOLUME: 81.4 ML
LYMPHOCYTES # BLD AUTO: 0.87 10*3/MM3 (ref 0.7–3.1)
LYMPHOCYTES NFR BLD AUTO: 13.5 % (ref 19.6–45.3)
MCH RBC QN AUTO: 29.5 PG (ref 26.6–33)
MCHC RBC AUTO-ENTMCNC: 32 G/DL (ref 31.5–35.7)
MCV RBC AUTO: 92.4 FL (ref 79–97)
MONOCYTES # BLD AUTO: 0.34 10*3/MM3 (ref 0.1–0.9)
MONOCYTES NFR BLD AUTO: 5.3 % (ref 5–12)
NEUTROPHILS NFR BLD AUTO: 5.12 10*3/MM3 (ref 1.7–7)
NEUTROPHILS NFR BLD AUTO: 79.3 % (ref 42.7–76)
PHOSPHATE SERPL-MCNC: 2 MG/DL (ref 2.5–4.5)
PLATELET # BLD AUTO: 139 10*3/MM3 (ref 140–450)
PMV BLD AUTO: 10.5 FL (ref 6–12)
POTASSIUM SERPL-SCNC: 2.8 MMOL/L (ref 3.5–5.2)
POTASSIUM SERPL-SCNC: 2.8 MMOL/L (ref 3.5–5.2)
POTASSIUM SERPL-SCNC: 3 MMOL/L (ref 3.5–5.2)
QT INTERVAL: 326 MS
QTC INTERVAL: 492 MS
RBC # BLD AUTO: 2.37 10*6/MM3 (ref 3.77–5.28)
SODIUM SERPL-SCNC: 140 MMOL/L (ref 136–145)
SODIUM SERPL-SCNC: 141 MMOL/L (ref 136–145)
WBC NRBC COR # BLD AUTO: 6.45 10*3/MM3 (ref 3.4–10.8)

## 2024-11-14 PROCEDURE — 85025 COMPLETE CBC W/AUTO DIFF WBC: CPT | Performed by: FAMILY MEDICINE

## 2024-11-14 PROCEDURE — 25010000002 CEFTRIAXONE PER 250 MG: Performed by: INTERNAL MEDICINE

## 2024-11-14 PROCEDURE — 84132 ASSAY OF SERUM POTASSIUM: CPT | Performed by: FAMILY MEDICINE

## 2024-11-14 PROCEDURE — 82607 VITAMIN B-12: CPT | Performed by: FAMILY MEDICINE

## 2024-11-14 PROCEDURE — 93306 TTE W/DOPPLER COMPLETE: CPT | Performed by: INTERNAL MEDICINE

## 2024-11-14 PROCEDURE — 25510000001 PERFLUTREN PROTEIN A MICROSPH SUSPENSION: Performed by: FAMILY MEDICINE

## 2024-11-14 PROCEDURE — 93306 TTE W/DOPPLER COMPLETE: CPT

## 2024-11-14 PROCEDURE — 84100 ASSAY OF PHOSPHORUS: CPT | Performed by: FAMILY MEDICINE

## 2024-11-14 PROCEDURE — 80048 BASIC METABOLIC PNL TOTAL CA: CPT | Performed by: FAMILY MEDICINE

## 2024-11-14 PROCEDURE — 97162 PT EVAL MOD COMPLEX 30 MIN: CPT

## 2024-11-14 RX ORDER — POTASSIUM CHLORIDE 750 MG/1
40 CAPSULE, EXTENDED RELEASE ORAL
Status: DISCONTINUED | OUTPATIENT
Start: 2024-11-14 | End: 2024-11-14

## 2024-11-14 RX ORDER — CYCLOBENZAPRINE HCL 10 MG
5 TABLET ORAL 3 TIMES DAILY PRN
Status: DISCONTINUED | OUTPATIENT
Start: 2024-11-14 | End: 2024-11-18 | Stop reason: HOSPADM

## 2024-11-14 RX ORDER — POTASSIUM CHLORIDE 750 MG/1
40 CAPSULE, EXTENDED RELEASE ORAL EVERY 4 HOURS
Status: COMPLETED | OUTPATIENT
Start: 2024-11-14 | End: 2024-11-15

## 2024-11-14 RX ORDER — QUETIAPINE FUMARATE 25 MG/1
25 TABLET, FILM COATED ORAL NIGHTLY
Status: DISCONTINUED | OUTPATIENT
Start: 2024-11-14 | End: 2024-11-17

## 2024-11-14 RX ORDER — POTASSIUM CHLORIDE 1500 MG/1
40 TABLET, EXTENDED RELEASE ORAL ONCE
Status: COMPLETED | OUTPATIENT
Start: 2024-11-14 | End: 2024-11-14

## 2024-11-14 RX ORDER — POTASSIUM CHLORIDE 750 MG/1
40 CAPSULE, EXTENDED RELEASE ORAL ONCE
Status: COMPLETED | OUTPATIENT
Start: 2024-11-14 | End: 2024-11-14

## 2024-11-14 RX ADMIN — HUMAN ALBUMIN MICROSPHERES AND PERFLUTREN 0.66 MG: 10; .22 INJECTION, SOLUTION INTRAVENOUS at 09:48

## 2024-11-14 RX ADMIN — ATORVASTATIN CALCIUM 40 MG: 40 TABLET, FILM COATED ORAL at 09:02

## 2024-11-14 RX ADMIN — ACETAMINOPHEN 650 MG: 325 TABLET, FILM COATED ORAL at 16:21

## 2024-11-14 RX ADMIN — DONEPEZIL HYDROCHLORIDE 10 MG: 10 TABLET, FILM COATED ORAL at 20:23

## 2024-11-14 RX ADMIN — CYCLOBENZAPRINE 5 MG: 10 TABLET, FILM COATED ORAL at 16:28

## 2024-11-14 RX ADMIN — DOCUSATE SODIUM 50 MG AND SENNOSIDES 8.6 MG 2 TABLET: 8.6; 5 TABLET, FILM COATED ORAL at 09:02

## 2024-11-14 RX ADMIN — Medication 10 ML: at 20:28

## 2024-11-14 RX ADMIN — POTASSIUM CHLORIDE 40 MEQ: 1500 TABLET, EXTENDED RELEASE ORAL at 06:35

## 2024-11-14 RX ADMIN — POTASSIUM CHLORIDE 40 MEQ: 750 CAPSULE, EXTENDED RELEASE ORAL at 20:23

## 2024-11-14 RX ADMIN — DOCUSATE SODIUM 50 MG AND SENNOSIDES 8.6 MG 2 TABLET: 8.6; 5 TABLET, FILM COATED ORAL at 20:23

## 2024-11-14 RX ADMIN — Medication 10 ML: at 09:03

## 2024-11-14 RX ADMIN — LEVOTHYROXINE SODIUM 50 MCG: 0.05 TABLET ORAL at 05:31

## 2024-11-14 RX ADMIN — CARVEDILOL 3.12 MG: 3.12 TABLET, FILM COATED ORAL at 16:21

## 2024-11-14 RX ADMIN — POTASSIUM & SODIUM PHOSPHATES POWDER PACK 280-160-250 MG 2 PACKET: 280-160-250 PACK at 09:02

## 2024-11-14 RX ADMIN — SODIUM CHLORIDE 1000 MG: 900 INJECTION INTRAVENOUS at 23:43

## 2024-11-14 RX ADMIN — QUETIAPINE FUMARATE 25 MG: 25 TABLET ORAL at 20:23

## 2024-11-14 RX ADMIN — Medication 10 MG: at 20:23

## 2024-11-14 RX ADMIN — POTASSIUM CHLORIDE 40 MEQ: 750 CAPSULE, EXTENDED RELEASE ORAL at 23:42

## 2024-11-14 RX ADMIN — POTASSIUM CHLORIDE 40 MEQ: 750 CAPSULE, EXTENDED RELEASE ORAL at 20:24

## 2024-11-14 RX ADMIN — CARVEDILOL 3.12 MG: 3.12 TABLET, FILM COATED ORAL at 09:02

## 2024-11-14 RX ADMIN — PANTOPRAZOLE SODIUM 40 MG: 40 TABLET, DELAYED RELEASE ORAL at 05:31

## 2024-11-14 RX ADMIN — SERTRALINE HYDROCHLORIDE 25 MG: 50 TABLET ORAL at 09:02

## 2024-11-14 NOTE — PROGRESS NOTES
[facility] Medicine Services  INPATIENT PROGRESS NOTE    Patient Name: Jennifer Gomes  Date of Admission: 11/12/2024  Today's Date: 11/14/24  Length of Stay: 2  Primary Care Physician: Provider, No Known    Subjective   Chief Complaint: Abdominal pain  Abdominal Pain    Weakness - Generalized  Associated symptoms include abdominal pain.      84-year-old female with history of hypertension, chronic kidney disease stage III, chronic pain, dementia, hypothyroidism, GERD, hyperlipidemia, discharged from the hospital October 2024, at that time admitted for acute renal insufficiency associated to bilateral hydronephrosis.  Patient had a Smith catheter placed.    Patient was admitted 11/12/2024 after she accidentally pulled out Smith catheter.  Found to have acute renal insufficiency, urinary tract infection; I started taking care of this patient 11/13/2024    Today, reports feeling better.  No family member present.  Had episode of atrial fibrillation yesterday, required Cardizem drip and transferred to 4 floor, converted to sinus rhythm at the end of the day.  She was started on Lovenox full anticoagulation, and an echocardiogram was requested.  She was not on oral anticoagulants before admission.  Smith catheter replaced.  Urology evaluated patient recommending to repeat imaging studies renal ultrasound 11/15.        Review of Systems   Gastrointestinal:  Positive for abdominal pain.      All pertinent negatives and positives are as above. All other systems have been reviewed and are negative unless otherwise stated.     Objective    Temp:  [98.2 °F (36.8 °C)-99 °F (37.2 °C)] 98.7 °F (37.1 °C)  Heart Rate:  [] 82  Resp:  [16-18] 18  BP: (112-142)/(41-57) 138/45  Physical Exam  Constitutional:       Appearance: Alert, oriented to place time and person.  No respiratory distress.  HENT:      Head: Normocephalic and atraumatic.      Nose: Nose normal.      Mouth/Throat:      Mouth:  "Mucous membranes are moist.   Eyes:      Extraocular Movements: Extraocular movements intact.      Conjunctiva/sclera: Conjunctivae normal.   Cardiovascular:      Rate and Rhythm: Normal rate and regular rhythm.      Pulses: Normal pulses.   Pulmonary:      Effort: No respiratory distress.      Breath sounds: Normal breath sounds. No wheezing, rhonchi or rales.   Abdominal:      General: Abdomen is flat. Bowel sounds are normal.      Palpations: Abdomen is soft.      Tenderness: There is no guarding or rebound.   Genitourinary.  Smith catheter in place  Extremities:  No lower extremity edema.  Skin:     Capillary Refill: Capillary refill takes less than 2 seconds.      Coloration: Skin is not jaundiced.      Findings: No rash.   Neurological:      General: No focal deficit present.      Mental Status: Patient is alert, disoriented to situation.     Sensory: No sensory deficit.      Motor: No weakness.      Coordination: Coordination normal. l.           Results Review:  I have reviewed the labs, radiology results, and diagnostic studies.    Laboratory Data:   Results from last 7 days   Lab Units 11/13/24  0441 11/12/24  1258   WBC 10*3/mm3 9.96 12.11*   HEMOGLOBIN g/dL 7.7* 8.8*   HEMATOCRIT % 23.2* 27.7*   PLATELETS 10*3/mm3 140 175        Results from last 7 days   Lab Units 11/14/24  0501 11/13/24  1632 11/13/24  0441 11/12/24  1258   SODIUM mmol/L 140  --  147* 142   POTASSIUM mmol/L 2.8* 2.2* 3.2* 3.3*   CHLORIDE mmol/L 113*  --  116* 109*   CO2 mmol/L 17.0*  --  16.0* 17.0*   BUN mg/dL 29*  --  39* 42*   CREATININE mg/dL 1.59*  --  2.02* 2.46*   CALCIUM mg/dL 9.4  --  10.4 10.7*   BILIRUBIN mg/dL  --   --   --  0.3   ALK PHOS U/L  --   --   --  93   ALT (SGPT) U/L  --   --   --  6   AST (SGOT) U/L  --   --   --  9   GLUCOSE mg/dL 115*  --  128* 187*       Culture Data:   No results found for: \"BLOODCX\", \"URINECX\", \"WOUNDCX\", \"MRSACX\", \"RESPCX\", \"STOOLCX\"    Radiology Data:   Imaging Results (Last 24 Hours)  "      ** No results found for the last 24 hours. **            I have reviewed the patient's current medications.     Assessment/Plan   Assessment  Active Hospital Problems    Diagnosis     **Acute renal failure superimposed on stage 3 chronic kidney disease     Acute cystitis     Hypercalcemia     Dementia     Chronic constipation     Anemia     Essential hypertension        Acute kidney injury/renal insufficiency on chronic kidney disease stage IIIb  Paroxysmal atrial fibrillation  Right hydronephrosis  History of bilateral hydronephrosis  Urinary tract infection, gram-negative bacilli  Constipation  Hypokalemia  Hypophosphatemia  Chronic normocytic anemia        Potassium 2.8, improved from 2.2 yesterday.  Phosphorus 2.0  Creatinine 1.59, down from 2.46  Sodium 140    Hemoglobin yesterday 7.7.  Pending CBC today    Urine culture with more than 100,000 gram-negative bacilli growing.  Blood cultures negative so far    Treatment Plan  Ceftriaxone day #3  Follow-up final urine culture    CBC pending for this morning    Renal ultrasound recommended 11/15/24 by urologist.  Continue Smith catheter.    Ended IV fluids.  Follow renal function and electrolytes.  Replace potassium and phosphorus again today per protocol.    Vitamin B12 level requested.    Continue anticoagulation.  Echocardiogram pending.  So far, [id number] Score 4        Medical Decision Making  Number and Complexity of problems: More than 10, moderate to high complexity  Differential Diagnosis: See above    Conditions and Status        Condition is unchanged.     MDM Data  External documents reviewed: None  Cardiac tracing (EKG, telemetry) interpretation: Atrial fibrillation  Radiology interpretation: Radiology reports reviewed  Labs reviewed: Yes  Any tests that were considered but not ordered: No     Decision rules/scores evaluated (example [id number], Wells, etc): See chart     Discussed with: Patient and nurse     Care Planning  Shared decision  making: With patient  Code status and discussions: Full code    Disposition  Social Determinants of Health that impact treatment or disposition: None  I expect the patient to be discharged to skilled facility in 1 days.         Electronically signed by Cuba Espitia MD, 11/14/24, 09:13 CST.

## 2024-11-14 NOTE — PLAN OF CARE
Problem: Adult Inpatient Plan of Care  Goal: Plan of Care Review  Outcome: Progressing  Goal: Patient-Specific Goal (Individualized)  Outcome: Progressing  Goal: Absence of Hospital-Acquired Illness or Injury  Outcome: Progressing  Intervention: Identify and Manage Fall Risk  Recent Flowsheet Documentation  Taken 11/14/2024 1500 by Deion Pereira RN  Safety Promotion/Fall Prevention: safety round/check completed  Taken 11/14/2024 1400 by Deion Pereira RN  Safety Promotion/Fall Prevention: safety round/check completed  Taken 11/14/2024 1300 by Deion Pereira RN  Safety Promotion/Fall Prevention: safety round/check completed  Taken 11/14/2024 1202 by Deion Pereira RN  Safety Promotion/Fall Prevention: safety round/check completed  Taken 11/14/2024 1100 by Deion Pereira RN  Safety Promotion/Fall Prevention: safety round/check completed  Taken 11/14/2024 1000 by Deion Pereira RN  Safety Promotion/Fall Prevention: safety round/check completed  Taken 11/14/2024 0900 by Deion Pereira RN  Safety Promotion/Fall Prevention: safety round/check completed  Taken 11/14/2024 0800 by Deion Pereira RN  Safety Promotion/Fall Prevention: safety round/check completed  Goal: Optimal Comfort and Wellbeing  Outcome: Progressing  Intervention: Provide Person-Centered Care  Recent Flowsheet Documentation  Taken 11/14/2024 0800 by Deion Pereira RN  Trust Relationship/Rapport:   care explained   questions answered  Goal: Readiness for Transition of Care  Outcome: Progressing     Problem: Fall Injury Risk  Goal: Absence of Fall and Fall-Related Injury  Outcome: Progressing  Intervention: Promote Injury-Free Environment  Recent Flowsheet Documentation  Taken 11/14/2024 1500 by Deion Pereira RN  Safety Promotion/Fall Prevention: safety round/check completed  Taken 11/14/2024 1400 by Deion Pereira RN  Safety Promotion/Fall Prevention: safety round/check completed  Taken 11/14/2024  1300 by Deion Pereira RN  Safety Promotion/Fall Prevention: safety round/check completed  Taken 11/14/2024 1202 by Deion Pereira RN  Safety Promotion/Fall Prevention: safety round/check completed  Taken 11/14/2024 1100 by Deion Pereira RN  Safety Promotion/Fall Prevention: safety round/check completed  Taken 11/14/2024 1000 by Deion Pereira RN  Safety Promotion/Fall Prevention: safety round/check completed  Taken 11/14/2024 0900 by Deion Pereira RN  Safety Promotion/Fall Prevention: safety round/check completed  Taken 11/14/2024 0800 by Deion Pereira RN  Safety Promotion/Fall Prevention: safety round/check completed     Problem: Comorbidity Management  Goal: Blood Pressure in Desired Range  Outcome: Progressing     Problem: Violence Risk or Actual  Goal: Anger and Impulse Control  Outcome: Progressing     Problem: Sepsis/Septic Shock  Goal: Optimal Coping  Outcome: Progressing  Goal: Absence of Bleeding  Outcome: Progressing  Goal: Blood Glucose Level Within Target Range  Outcome: Progressing  Goal: Absence of Infection Signs and Symptoms  Outcome: Progressing  Goal: Optimal Nutrition Delivery  Outcome: Progressing     Problem: Skin Injury Risk Increased  Goal: Skin Health and Integrity  Outcome: Progressing  Intervention: Optimize Skin Protection  Recent Flowsheet Documentation  Taken 11/14/2024 0800 by Deion Pereira RN  Pressure Reduction Techniques:   frequent weight shift encouraged   heels elevated off bed  Pressure Reduction Devices: pressure-redistributing mattress utilized       Goal Outcome Evaluation:   Pt kept safe and comfortable with needs attended to as appropriate, frequently reoriented to situation, VSS, safety precautions implemented

## 2024-11-14 NOTE — PLAN OF CARE
Goal Outcome Evaluation:  Plan of Care Reviewed With: patient           Outcome Evaluation: PT eval completed. Pt asleep in L sidelying, near foot of bed, and agreeable to session with max encouragement. Pt did complete all orienteation questions correctly x4 but remained conversationally confused throughout session. Pt required Min/Mod A to bring self to EOB. In sitting pt demo roughly 50% of B hip flexion and knee ext.No formal MMT performed due to conversation confused and lack of AROM. Pt demo decreased strength in B LE as seen with difficulty reaching full stand to reach terminal knee ext. Pt required Mod A to attempt sit<>stand but was unable to complete full stand and remained with knees flexed. Pt unable to tolerate side steps EOB and was returned to supine and repositioned higher in bed with Max/Dep A, bed alarm realarmed. Pt will benefit from skilled PT services to decrease fall risk, improve t/f and return to PLOF. Recommend SNF when medically stable but will continue to follow and progress as tolerated.    Anticipated Discharge Disposition (PT): skilled nursing facility

## 2024-11-14 NOTE — PLAN OF CARE
Goal Outcome Evaluation:  Plan of Care Reviewed With: patient        Progress: no change  Outcome Evaluation: Intermittent agitation/refusal of cares/compliance. SR 76-91. RA. K 2.8, replacement ordered.         Problem: Violence Risk or Actual  Goal: Anger and Impulse Control  Intervention: Minimize Safety Risk  Recent Flowsheet Documentation  Taken 11/13/2024 2030 by Mildred Mckeon, RN  Enhanced Safety Measures:   bed alarm set   room near unit station

## 2024-11-14 NOTE — THERAPY EVALUATION
Patient Name: Jennifer Gomes  : 1940    MRN: [id number]                              Today's Date: 2024       Admit Date: 2024    Visit Dx:     ICD-10-CM ICD-9-CM   1. TOMÁS (acute kidney injury)  N17.9 584.9   2. Acute cystitis without hematuria  N30.00 595.0   3. Impaired mobility [Z74.09]  Z74.09 799.89     Patient Active Problem List   Diagnosis    Gastroesophageal reflux disease    Spinal stenosis, lumbar region, without neurogenic claudication    Erosion of vaginal mesh    Adenocarcinoma of endometrium    S/P hysterectomy with oophorectomy    Encounter for follow-up surveillance of endometrial cancer    History of radiation therapy    Non-traumatic rhabdomyolysis    Acute renal failure superimposed on stage 3 chronic kidney disease    Medical non-compliance, does not take narcotics as prescribed    Chronic constipation    Chronic pain syndrome    Chronic prescription opiate use    Anemia    Hypothyroidism (acquired)    Essential hypertension    Venous insufficiency of both lower extremities    Chronic intractable headache    RAFAL (obstructive sleep apnea)    Acute encephalopathy due to UTI    Toxic metabolic encephalopathy    Polypharmacy    Frequent falls    Grade III internal hemorrhoids    External hemorrhoids    Colitis    Moderate malnutrition    Transaminitis    Acute UTI (urinary tract infection)    Polypharmacy    UTI (urinary tract infection)    Dementia    Hypokalemia    Sepsis due to urinary tract infection    Cardiopulmonary arrest    E coli bacteremia    Anemia requiring transfusions    Bilateral hydronephrosis    Acute urinary retention    TOMÁS (acute kidney injury)    Acute cystitis    Hypercalcemia    Paroxysmal atrial fibrillation     Past Medical History:   Diagnosis Date    Anxiety     Arthritis     Bronchitis     Cancer     uterine    Chronic pain     Depression     Disease of thyroid gland     Fibromyalgia     GERD (gastroesophageal reflux disease)     Headache     History  of transfusion     AS     Hyperlipidemia     Hypertension     Incontinence     Insomnia     Leg pain     Lumbar stenosis     Migraines     Peptic ulcer     Restless legs     Sleep apnea     NO C-PAP    UTI (urinary tract infection)     Vaginal bleeding      Past Surgical History:   Procedure Laterality Date    BLADDER REPAIR      MESH HAD TO BE REMOVED IN 2013    BREAST BIOPSY Right 2017    benign    BREAST CYST EXCISION Left     CARDIAC CATHETERIZATION      CARPAL TUNNEL RELEASE      CATARACT EXTRACTION W/ INTRAOCULAR LENS  IMPLANT, BILATERAL      CHOLECYSTECTOMY WITH INTRAOPERATIVE CHOLANGIOGRAM N/A 2023    Procedure: CHOLECYSTECTOMY LAPAROSCOPIC INTRAOPERATIVE CHOLANGIOGRAM;  Surgeon: Gerardo Arciniega MD;  Location:  [facility] OR;  Service: General;  Laterality: N/A;    COLONOSCOPY      COLONOSCOPY N/A 10/01/2021    Procedure: COLONOSCOPY WITH ANESTHESIA;  Surgeon: Tom Velasco DO;  Location:  [facility] ENDOSCOPY;  Service: Gastroenterology;  Laterality: N/A;  pre: change in bowel habits  post: diverticulosis. hemorrhoids.   Olivia Mora APRN        CYSTECTOMY      D & C HYSTEROSCOPY N/A 2017    Procedure: DILATATION AND CURETTAGE HYSTEROSCOPY;  Surgeon: Shasta Madrigal MD;  Location: [facility] OR;  Service:     DILATION AND CURETTAGE, DIAGNOSTIC / THERAPEUTIC      ENDOSCOPY  2010    Short segment of Arriola's,Moderate chroninc esophagogastritis and negative H.pylori    ENDOSCOPY N/A 2017    Procedure: ESOPHAGOGASTRODUODENOSCOPY WITH ANESTHESIA;  Surgeon: Tom Velasco DO;  Location: [facility] ENDOSCOPY;  Service:     EYE SURGERY      RETINA    HEMORRHOIDECTOMY SIGMOIDOSCOPY N/A 3/21/2023    Procedure: HEMORRHOIDECTOMY WITH EXAM UNDER ANESTHESIA;  Surgeon: Holly Chavez MD;  Location:  [facility] OR;  Service: General;  Laterality: N/A;    HYSTERECTOMY  2017    ORIF TIBIA/FIBULA FRACTURES Left     TRANSVAGINAL TAPING SUSPENSION N/A 2017    Procedure:  VAGINAL MESH REVISION;  Surgeon: Shasta Madrigal MD;  Location[facility] OR;  Service:     VAGINAL MESH REVISION  2013      General Information       Row Name 11/14/24 1501          Physical Therapy Time and Intention    Document Type evaluation  -AJ     Mode of Treatment physical therapy  -       Row Name 11/14/24 1501          General Information    Patient Profile Reviewed yes  -AJ     Prior Level of Function --  no family at bedside, remains cnfused  -AJ     Existing Precautions/Restrictions fall;other (see comments)  mcclain  -       Row Name 11/14/24 1501          Living Environment    People in Home spouse;child(sebastian), adult  -       Row Name 11/14/24 1501          Home Main Entrance    Number of Stairs, Main Entrance four;other (see comments)  per previous chart review, unable of complete accuracy today  -       Row Name 11/14/24 1501          Cognition    Orientation Status (Cognition) oriented x 4;other (see comments)  was oriented x4 but conversationally confused thorughout session  -       Row Name 11/14/24 1501          Safety Issues/Impairments Affecting Functional Mobility    Safety Issues Affecting Function (Mobility) safety precaution awareness;safety precautions follow-through/compliance;insight into deficits/self-awareness  -AJ     Impairments Affecting Function (Mobility) balance;range of motion (ROM);endurance/activity tolerance;strength;pain  -AJ               User Key  (r) = Recorded By, (t) = Taken By, (c) = Cosigned By      Initials Name Provider Type    Sunny Tsang, PT DPT Physical Therapist                   Mobility       Row Name 11/14/24 1501          Bed Mobility    Bed Mobility rolling right;supine-sit;scooting/bridging  -KAVITA     Rolling Right Power (Bed Mobility) minimum assist (75% patient effort)  -KAVITA     Scooting/Bridging Power (Bed Mobility) maximum assist (25% patient effort);dependent (less than 25% patient effort)  -KAVITA     Supine-Sit Power (Bed  "Mobility) moderate assist (50% patient effort)  -     Assistive Device (Bed Mobility) repositioning sheet  -       Row Name 11/14/24 1501          Sit-Stand Transfer    Sit-Stand [place] (Transfers) moderate assist (50% patient effort)  -     Assistive Device (Sit-Stand Transfers) --  HHA  -     Comment, (Sit-Stand Transfer) pt stood to clear bed and remained with knees flexed, \"i cannot do this anymore\"  -       Row Name 11/14/24 1501          Gait/Stairs (Locomotion)    [place] Level (Gait) other (see comments)  attempted side steps but unable to perform  -     Patient was able to Ambulate no, other medical factors prevent ambulation  -     Reason Patient was unable to Ambulate Excessive Weakness  -               User Key  (r) = Recorded By, (t) = Taken By, (c) = Cosigned By      Initials Name Provider Type    Sunny Tsang, HUNTER DPT Physical Therapist                   Obj/Interventions       Row Name 11/14/24 1501          Range of Motion Comprehensive    General Range of Motion lower extremity range of motion deficits identified  -     Comment, General Range of Motion pt demo roughly 50% of B hip flexion and knee ext  -       Row Name 11/14/24 1501          Strength Comprehensive (MMT)    General Manual Muscle Testing (MMT) Assessment lower extremity strength deficits identified  -     Comment, General Manual Muscle Testing (MMT) Assessment No formal MMT performed due to conversation confused and lack of AROM. Pt demo decreased strength in B LE as seen with difficulty reaching full stand to reach terminal knee ext  -       Row Name 11/14/24 1501          Balance    Balance Assessment sitting static balance;sitting dynamic balance;standing static balance;standing dynamic balance  -     Static Sitting Balance contact guard  -     Dynamic Sitting Balance contact guard  -     Position, Sitting Balance supported;sitting edge of bed  -     Static Standing Balance minimal " assist;1-person assist  -AJ     Dynamic Standing Balance moderate assist;1-person assist  -AJ     Position/Device Used, Standing Balance supported;other (see comments)  [facility]  -AJ     Balance Interventions sitting;standing;sit to stand;supported;dynamic;static  -AJ               User Key  (r) = Recorded By, (t) = Taken By, (c) = Cosigned By      Initials Name Provider Type    Sunny Tsang, PT DPT Physical Therapist                   Goals/Plan       Row Name 11/14/24 1501          Bed Mobility Goal 1 (PT)    Activity/Assistive Device (Bed Mobility Goal 1, PT) rolling to left;rolling to right;sit to supine;supine to sit  -AJ     [place] Level/Cues Needed (Bed Mobility Goal 1, PT) standby assist;verbal cues required  -AJ     Time Frame (Bed Mobility Goal 1, PT) long term goal (LTG);10 days  -AJ     Progress/Outcomes (Bed Mobility Goal 1, PT) new goal  -       Row Name 11/14/24 1501          Transfer Goal 1 (PT)    Activity/Assistive Device (Transfer Goal 1, PT) sit-to-stand/stand-to-sit;bed-to-chair/chair-to-bed;toilet;wheelchair transfer  -AJ     [place] Level/Cues Needed (Transfer Goal 1, PT) contact guard required  -AJ     Time Frame (Transfer Goal 1, PT) long term goal (LTG);10 days  -AJ     Progress/Outcome (Transfer Goal 1, PT) new goal  -       Row Name 11/14/24 1501          Gait Training Goal 1 (PT)    Activity/Assistive Device (Gait Training Goal 1, PT) gait (walking locomotion);decrease asymmetrical patterns;decrease fall risk;diminish gait deviation;forward stepping;improve balance and speed;increase endurance/gait distance;increase energy conservation;assistive device use;walker, rolling  -AJ     [place] Level (Gait Training Goal 1, PT) minimum assist (75% or more patient effort)  -AJ     Distance (Gait Training Goal 1, PT) 25'  -AJ     Time Frame (Gait Training Goal 1, PT) long term goal (LTG);10 days  -AJ     Progress/Outcome (Gait Training Goal 1, PT) new goal  -       Row Name  11/14/24 1501          ROM Goal 1 (PT)    ROM Goal 1 (PT) Pt will demo >75% AROM in B LE as needed for safety with t/f and decrease fall risk  -AJ     Time Frame (ROM Goal 1, PT) long-term goal (LTG);10 days  -AJ     Progress/Outcome (ROM Goal 1, PT) new goal  -       Row Name 11/14/24 1501          Therapy Assessment/Plan (PT)    Planned Therapy Interventions (PT) balance training;bed mobility training;gait training;home exercise program;postural re-education;patient/family education;transfer training;stretching;strengthening;ROM (range of motion)  -AJ               User Key  (r) = Recorded By, (t) = Taken By, (c) = Cosigned By      Initials Name Provider Type    Sunny Tsang, PT DPT Physical Therapist                   Clinical Impression       Row Name 11/14/24 1501          Pain    Pain Location back;buttock;abdomen;groin  -AJ     Pain Side/Orientation generalized  -AJ     Pain Management Interventions nursing notified;exercise or physical activity utilized  -AJ     Response to Pain Interventions activity participation with increased pain  -AJ     Additional Documentation Pain Scale: FACES Pre/Post-Treatment (Group)  -       Row Name 11/14/24 1501          Pain Scale: FACES Pre/Post-Treatment    Pain: FACES Scale, Pretreatment 2-->hurts little bit  -AJ     Posttreatment Pain Rating 2-->hurts little bit  -       Row Name 11/14/24 1501          Plan of Care Review    Plan of Care Reviewed With patient  -AJ     Outcome Evaluation PT eval completed. Pt asleep in L sidelying, near foot of bed, and agreeable to session with max encouragement. Pt did complete all orienteation questions correctly x4 but remained conversationally confused throughout session. Pt required Min/Mod A to bring self to EOB. In sitting pt demo roughly 50% of B hip flexion and knee ext.No formal MMT performed due to conversation confused and lack of AROM. Pt demo decreased strength in B LE as seen with difficulty reaching full stand to  reach terminal knee ext. Pt required Mod A to attempt sit<>stand but was unable to complete full stand and remained with knees flexed. Pt unable to tolerate side steps EOB and was returned to supine and repositioned higher in bed with Max/Dep A, bed alarm realarmed. Pt will benefit from skilled PT services to decrease fall risk, improve t/f and return to PLOF. Recommend SNF when medically stable but will continue to follow and progress as tolerated.  -       Row Name 11/14/24 1501          Therapy Assessment/Plan (PT)    Patient/Family Therapy Goals Statement (PT) none stated  -     Rehab Potential (PT) fair  -     Criteria for Skilled Interventions Met (PT) yes;skilled treatment is necessary;meets criteria  -     Therapy Frequency (PT) 2 times/day  -AJ     Predicted Duration of Therapy Intervention (PT) until d/c  -       Row Name 11/14/24 1501          Vital Signs    Pre Patient Position Side Lying  -AJ     Intra Patient Position Standing  -AJ     Post Patient Position Supine  -       Row Name 11/14/24 1501          Positioning and Restraints    Pre-Treatment Position in bed  -     Post Treatment Position bed  -AJ     In Bed notified nsg;fowlers;call light within reach;encouraged to call for assist;exit alarm on;side rails up x2  -               User Key  (r) = Recorded By, (t) = Taken By, (c) = Cosigned By      Initials Name Provider Type    Sunny Tsang, PT DPT Physical Therapist                   Outcome Measures       Row Name 11/14/24 1501 11/14/24 0800       How much help from another person do you currently need...    Turning from your back to your side while in flat bed without using bedrails? 2  -AJ 2  -EF    Moving from lying on back to sitting on the side of a flat bed without bedrails? 2  -AJ 2  -EF    Moving to and from a bed to a chair (including a wheelchair)? 1  -AJ 2  -EF    Standing up from a chair using your arms (e.g., wheelchair, bedside chair)? 2  -AJ 2  -EF    Climbing  3-5 steps with a railing? 1  - 1  -EF    To walk in hospital room? 2  - 1  -EF    AM-PAC 6 Clicks Score (PT) 10  - 10  -    Highest Level of Mobility Goal 4 --> Transfer to chair/commode  - 4 --> Transfer to chair/commode  -      Row Name 11/14/24 1501          Functional Assessment    Outcome Measure Options AM-PAC 6 Clicks Basic Mobility (PT)  -               User Key  (r) = Recorded By, (t) = Taken By, (c) = Cosigned By      Initials Name Provider Type     Sunny Esquivel, PT DPT Physical Therapist    Deion Martinez, RN Registered Nurse                                 Physical Therapy Education       Title: PT OT SLP Therapies (In Progress)       Topic: Physical Therapy (In Progress)       Point: Mobility training (In Progress)       Learning Progress Summary            Patient Acceptance, E, NR by  at 11/14/2024 1607    Comment: role of PT, fall risk, calling for assist                      Point: Home exercise program (In Progress)       Learning Progress Summary            Patient Acceptance, E, NR by  at 11/14/2024 1607    Comment: role of PT, fall risk, calling for assist                      Point: Body mechanics (In Progress)       Learning Progress Summary            Patient Acceptance, E, NR by  at 11/14/2024 1607    Comment: role of PT, fall risk, calling for assist                      Point: Precautions (In Progress)       Learning Progress Summary            Patient Acceptance, E, NR by  at 11/14/2024 1607    Comment: role of PT, fall risk, calling for assist                                      User Key       Initials Effective Dates Name Provider Type [facility] 08/15/24 -  Sunny Esquivel PT DPT Physical Therapist PT                  PT Recommendation and Plan  Planned Therapy Interventions (PT): balance training, bed mobility training, gait training, home exercise program, postural re-education, patient/family education, transfer training, stretching,  strengthening, ROM (range of motion)  Outcome Evaluation: PT eval completed. Pt asleep in L sidelying, near foot of bed, and agreeable to session with max encouragement. Pt did complete all orienteation questions correctly x4 but remained conversationally confused throughout session. Pt required Min/Mod A to bring self to EOB. In sitting pt demo roughly 50% of B hip flexion and knee ext.No formal MMT performed due to conversation confused and lack of AROM. Pt demo decreased strength in B LE as seen with difficulty reaching full stand to reach terminal knee ext. Pt required Mod A to attempt sit<>stand but was unable to complete full stand and remained with knees flexed. Pt unable to tolerate side steps EOB and was returned to supine and repositioned higher in bed with Max/Dep A, bed alarm realarmed. Pt will benefit from skilled PT services to decrease fall risk, improve t/f and return to PLOF. Recommend SNF when medically stable but will continue to follow and progress as tolerated.     Time Calculation:         PT Charges       Row Name 11/14/24 1501             Time Calculation    Start Time 1501  -AJ      Stop Time 1525  +8 for chart review  -      Time Calculation (min) 24 min  -AJ      PT Received On 11/14/24  -AJ      PT Goal Re-Cert Due Date 11/24/24  -AJ         Untimed Charges    PT Eval/Re-eval Minutes 32  -AJ         Total Minutes    Untimed Charges Total Minutes 32  -AJ       Total Minutes 32  -AJ                User Key  (r) = Recorded By, (t) = Taken By, (c) = Cosigned By      Initials Name Provider Type    AJ Sunny Esquivel, PT DPT Physical Therapist                  Therapy Charges for Today       Code Description Service Date Service Provider Modifiers Qty    80529300296 HC PT EVAL MOD COMPLEXITY 2 11/14/2024 Sunny Esquivel, PT DPT GP 1            PT G-Codes  Outcome Measure Options: AM-PAC 6 Clicks Basic Mobility (PT)  AM-PAC 6 Clicks Score (PT): 10  PT Discharge Summary  Anticipated Discharge  Disposition (PT): skilled nursing facility    Sunny Esuqivel, PT DPT  11/14/2024

## 2024-11-15 ENCOUNTER — APPOINTMENT (OUTPATIENT)
Dept: ULTRASOUND IMAGING | Facility: HOSPITAL | Age: 84
DRG: 684 | End: 2024-11-15
Payer: MEDICARE

## 2024-11-15 LAB
ABO GROUP BLD: NORMAL
ANION GAP SERPL CALCULATED.3IONS-SCNC: 8 MMOL/L (ref 5–15)
BASOPHILS # BLD AUTO: 0.01 10*3/MM3 (ref 0–0.2)
BASOPHILS NFR BLD AUTO: 0.1 % (ref 0–1.5)
BLD GP AB SCN SERPL QL: NEGATIVE
BUN SERPL-MCNC: 27 MG/DL (ref 8–23)
BUN/CREAT SERPL: 19.7 (ref 7–25)
CALCIUM SPEC-SCNC: 9.2 MG/DL (ref 8.6–10.5)
CHLORIDE SERPL-SCNC: 117 MMOL/L (ref 98–107)
CO2 SERPL-SCNC: 16 MMOL/L (ref 22–29)
CREAT SERPL-MCNC: 1.37 MG/DL (ref 0.57–1)
DEPRECATED RDW RBC AUTO: 52.7 FL (ref 37–54)
EGFRCR SERPLBLD CKD-EPI 2021: 38.6 ML/MIN/1.73
EOSINOPHIL # BLD AUTO: 0.17 10*3/MM3 (ref 0–0.4)
EOSINOPHIL NFR BLD AUTO: 2.1 % (ref 0.3–6.2)
ERYTHROCYTE [DISTWIDTH] IN BLOOD BY AUTOMATED COUNT: 16 % (ref 12.3–15.4)
GLUCOSE SERPL-MCNC: 117 MG/DL (ref 65–99)
HCT VFR BLD AUTO: 20.8 % (ref 34–46.6)
HCT VFR BLD AUTO: 24.3 % (ref 34–46.6)
HGB BLD-MCNC: 6.6 G/DL (ref 12–15.9)
HGB BLD-MCNC: 7.9 G/DL (ref 12–15.9)
IMM GRANULOCYTES # BLD AUTO: 0.08 10*3/MM3 (ref 0–0.05)
IMM GRANULOCYTES NFR BLD AUTO: 1 % (ref 0–0.5)
LYMPHOCYTES # BLD AUTO: 1.1 10*3/MM3 (ref 0.7–3.1)
LYMPHOCYTES NFR BLD AUTO: 13.9 % (ref 19.6–45.3)
MCH RBC QN AUTO: 29.4 PG (ref 26.6–33)
MCHC RBC AUTO-ENTMCNC: 32.5 G/DL (ref 31.5–35.7)
MCV RBC AUTO: 90.3 FL (ref 79–97)
MONOCYTES # BLD AUTO: 0.53 10*3/MM3 (ref 0.1–0.9)
MONOCYTES NFR BLD AUTO: 6.7 % (ref 5–12)
NEUTROPHILS NFR BLD AUTO: 6.02 10*3/MM3 (ref 1.7–7)
NEUTROPHILS NFR BLD AUTO: 76.2 % (ref 42.7–76)
PHOSPHATE SERPL-MCNC: 2.1 MG/DL (ref 2.5–4.5)
PHOSPHATE SERPL-MCNC: 2.1 MG/DL (ref 2.5–4.5)
PLATELET # BLD AUTO: 139 10*3/MM3 (ref 140–450)
PMV BLD AUTO: 10.2 FL (ref 6–12)
POTASSIUM SERPL-SCNC: 4.7 MMOL/L (ref 3.5–5.2)
RBC # BLD AUTO: 2.69 10*6/MM3 (ref 3.77–5.28)
RH BLD: POSITIVE
SODIUM SERPL-SCNC: 141 MMOL/L (ref 136–145)
T&S EXPIRATION DATE: NORMAL
VIT B12 BLD-MCNC: 523 PG/ML (ref 211–946)
WBC NRBC COR # BLD AUTO: 7.91 10*3/MM3 (ref 3.4–10.8)

## 2024-11-15 PROCEDURE — 99232 SBSQ HOSP IP/OBS MODERATE 35: CPT | Performed by: UROLOGY

## 2024-11-15 PROCEDURE — 80048 BASIC METABOLIC PNL TOTAL CA: CPT | Performed by: FAMILY MEDICINE

## 2024-11-15 PROCEDURE — 84100 ASSAY OF PHOSPHORUS: CPT | Performed by: FAMILY MEDICINE

## 2024-11-15 PROCEDURE — 76775 US EXAM ABDO BACK WALL LIM: CPT

## 2024-11-15 PROCEDURE — 85014 HEMATOCRIT: CPT | Performed by: FAMILY MEDICINE

## 2024-11-15 PROCEDURE — P9016 RBC LEUKOCYTES REDUCED: HCPCS

## 2024-11-15 PROCEDURE — 86901 BLOOD TYPING SEROLOGIC RH(D): CPT | Performed by: INTERNAL MEDICINE

## 2024-11-15 PROCEDURE — 97166 OT EVAL MOD COMPLEX 45 MIN: CPT | Performed by: OCCUPATIONAL THERAPIST

## 2024-11-15 PROCEDURE — 85018 HEMOGLOBIN: CPT | Performed by: FAMILY MEDICINE

## 2024-11-15 PROCEDURE — 36430 TRANSFUSION BLD/BLD COMPNT: CPT

## 2024-11-15 PROCEDURE — 25810000003 SODIUM CHLORIDE 0.9 % SOLUTION 250 ML FLEX CONT: Performed by: FAMILY MEDICINE

## 2024-11-15 PROCEDURE — 86900 BLOOD TYPING SEROLOGIC ABO: CPT | Performed by: INTERNAL MEDICINE

## 2024-11-15 PROCEDURE — 86923 COMPATIBILITY TEST ELECTRIC: CPT

## 2024-11-15 PROCEDURE — 86850 RBC ANTIBODY SCREEN: CPT | Performed by: INTERNAL MEDICINE

## 2024-11-15 PROCEDURE — 30233N1 TRANSFUSION OF NONAUTOLOGOUS RED BLOOD CELLS INTO PERIPHERAL VEIN, PERCUTANEOUS APPROACH: ICD-10-PCS | Performed by: INTERNAL MEDICINE

## 2024-11-15 PROCEDURE — 86900 BLOOD TYPING SEROLOGIC ABO: CPT

## 2024-11-15 PROCEDURE — 85025 COMPLETE CBC W/AUTO DIFF WBC: CPT | Performed by: FAMILY MEDICINE

## 2024-11-15 RX ORDER — CEFDINIR 300 MG/1
300 CAPSULE ORAL
Status: DISCONTINUED | OUTPATIENT
Start: 2024-11-15 | End: 2024-11-18 | Stop reason: HOSPADM

## 2024-11-15 RX ADMIN — POTASSIUM CHLORIDE 40 MEQ: 750 CAPSULE, EXTENDED RELEASE ORAL at 05:03

## 2024-11-15 RX ADMIN — PANTOPRAZOLE SODIUM 40 MG: 40 TABLET, DELAYED RELEASE ORAL at 05:03

## 2024-11-15 RX ADMIN — LEVOTHYROXINE SODIUM 50 MCG: 0.05 TABLET ORAL at 05:03

## 2024-11-15 RX ADMIN — DONEPEZIL HYDROCHLORIDE 10 MG: 10 TABLET, FILM COATED ORAL at 20:28

## 2024-11-15 RX ADMIN — QUETIAPINE FUMARATE 25 MG: 25 TABLET ORAL at 20:28

## 2024-11-15 RX ADMIN — ACETAMINOPHEN 650 MG: 325 TABLET, FILM COATED ORAL at 17:07

## 2024-11-15 RX ADMIN — SERTRALINE HYDROCHLORIDE 25 MG: 50 TABLET ORAL at 08:34

## 2024-11-15 RX ADMIN — CEFDINIR 300 MG: 300 CAPSULE ORAL at 08:34

## 2024-11-15 RX ADMIN — SODIUM PHOSPHATE, MONOBASIC, MONOHYDRATE AND SODIUM PHOSPHATE, DIBASIC, ANHYDROUS 15 MMOL: 142; 276 INJECTION, SOLUTION INTRAVENOUS at 05:51

## 2024-11-15 RX ADMIN — SODIUM PHOSPHATE, MONOBASIC, MONOHYDRATE AND SODIUM PHOSPHATE, DIBASIC, ANHYDROUS 15 MMOL: 276; 142 INJECTION, SOLUTION INTRAVENOUS at 18:59

## 2024-11-15 RX ADMIN — ATORVASTATIN CALCIUM 40 MG: 40 TABLET, FILM COATED ORAL at 08:34

## 2024-11-15 RX ADMIN — Medication 10 MG: at 20:28

## 2024-11-15 RX ADMIN — DOCUSATE SODIUM 50 MG AND SENNOSIDES 8.6 MG 2 TABLET: 8.6; 5 TABLET, FILM COATED ORAL at 08:34

## 2024-11-15 RX ADMIN — Medication 10 ML: at 20:28

## 2024-11-15 RX ADMIN — ACETAMINOPHEN 650 MG: 325 TABLET, FILM COATED ORAL at 20:28

## 2024-11-15 RX ADMIN — CARVEDILOL 3.12 MG: 3.12 TABLET, FILM COATED ORAL at 17:08

## 2024-11-15 RX ADMIN — Medication 10 ML: at 08:35

## 2024-11-15 RX ADMIN — CARVEDILOL 3.12 MG: 3.12 TABLET, FILM COATED ORAL at 08:34

## 2024-11-15 NOTE — CASE MANAGEMENT/SOCIAL WORK
Continued Stay Note  [facility]     Patient Name: Jennifer Gomes  MRN: [id number]  Today's Date: 11/15/2024    Admit Date: 11/12/2024    Plan: SNF   Discharge Plan       Row Name 11/15/24 1213       Plan    Plan SNF    Patient/Family in Agreement with Plan yes    Provided Post Acute Provider List? Yes    Post Acute Provider List Nursing Home    Delivered To Support Person    Method of Delivery Telephone    Plan Comments Therapy is recommending SNF placement.  BRITTANI spoke to pt's daughter, Ivonne at [phone].  Ivonne stated that her mother was not in the hospital; she stated she was yesterday but was currently at home.  She stated she just left her house.  BRITTANI assured her that her mother was a pt in the hospital at [facility] in room 442.  BRITTANI then went to pt's room to discuss rehab placement with pt.  Pt was confused and thought she was already in a rehab facility and did not know she was at [facility].  She could not answer questions as to where referrals should be made.  BRITTANI spoke to pt's spouse, Adams at [phone].  He requested a referral to [facility].  BRITTANI explained previous conversation with pt's daughter and that she was confused.  He stated this was not like his daughter and he would check on her.  BRITTANI will await response from [facility].    12:47-  [facility] has offered a bed.  Awaiting OT eval to being precert.    Final Discharge Disposition Code 03 - skilled nursing facility (SNF)                   Discharge Codes    No documentation.                       JEFRY Devi

## 2024-11-15 NOTE — PROGRESS NOTES
[facility]     Progress Note    Patient Name: Jennifer Gomes  : 1940  MRN: [id number]  Primary Care Physician:  Provider, No Known  Date of admission: 2024    Subjective   Subjective     Chief Complaint: Retention bilateral hydronephrosis    History of Present Illness  Patient Reports no issues with Smith catheter, retinae improving with Smith catheter in place    Review of Systems   Constitutional:  Negative for chills and fever.   Gastrointestinal:  Negative for abdominal pain, anal bleeding and blood in stool.   Genitourinary:  Negative for dysuria and hematuria.       Objective   Objective     Vitals:   Temp:  [98.5 °F (36.9 °C)-98.8 °F (37.1 °C)] 98.8 °F (37.1 °C)  Heart Rate:  [74-82] 75  Resp:  [18] 18  BP: (119-147)/(42-62) 145/52    Physical Exam  Vitals reviewed.   Constitutional:       General: She is not in acute distress.     Appearance: She is well-developed. She is not diaphoretic.   Pulmonary:      Effort: Pulmonary effort is normal.   Abdominal:      General: There is no distension.      Palpations: Abdomen is soft. There is no mass.      Tenderness: There is no abdominal tenderness. There is no guarding or rebound.      Hernia: No hernia is present.   Genitourinary:     Comments: Smith in place  Skin:     General: Skin is warm and dry.   Neurological:      Mental Status: She is alert and oriented to person, place, and time.          Result Review    Result Review:  I have personally reviewed the results from the time of this admission to 11/15/2024 06:58 CST and agree with these findings:  [x]  Laboratory list / accordion  []  Microbiology  []  Radiology  []  EKG/Telemetry   []  Cardiology/Vascular   []  Pathology  []  Old records  []  Other:  Most notable findings include: Creatinine 1.37 previous 1.55      Assessment & Plan   Assessment / Plan     Brief Patient Summary:  Jennifer Gomes is a 84 y.o. female who urinary retention bilateral hydronephrosis    [facility] Hospital  Problems:  Active Hospital Problems    Diagnosis     **Acute renal failure superimposed on stage 3 chronic kidney disease     Paroxysmal atrial fibrillation     Acute cystitis     Hypercalcemia     Dementia     Chronic constipation     Anemia     Essential hypertension      Plan:     Renal ultrasound this morning will follow-up results of this.  Hopefully this shows resolution of right-sided hydronephrosis.  She does need to have long-term management of her bladder.  This needs to be done on an outpatient basis.  I expressed with her the importance of follow-up.  Discussed suprapubic tube as well.  I do not think she would be able to perform clean intermittent catheterization and discussed that with her today.      Reviewed renal ultrasound it appears that she has had resolution of her hydronephrosis with catheter drainage.  She can be discharged from urology standpoint.  She needs to follow-up in 1 to 2 weeks to discuss further management of her bladder.  VTE Prophylaxis:  Pharmacologic & mechanical VTE prophylaxis orders are present.        CODE STATUS:    Level Of Support Discussed With: Patient  Code Status (Patient has no pulse and is not breathing): CPR (Attempt to Resuscitate)  Medical Interventions (Patient has pulse or is breathing): Full Support    Disposition:  I expect patient to be discharged primary team.    Eleazar Muhammad MD

## 2024-11-15 NOTE — PLAN OF CARE
Goal Outcome Evaluation:  Plan of Care Reviewed With: patient        Progress: no change  Outcome Evaluation: OT eval complete.  Pt. is AxO x 2 & while is conversational she is also confused.  Ms. Gomes was able to follow commands and come to EOB at S.  She has at [facility] to KARLOS socks.  Ms. Gomes was then able to scoot toward EOB and to HOB utilizing good dynamic sitting posture and vc's to reposition herself.  She demo's generalized weakness and debility.  OTR and pt attempted to stand x 2 and pt demo'd weakness, fear of falling, inihibition, poor body mechs, and was ultimately unsuccessful.  Found pt to have had incontinent episode so assisted her to supine to complete hygieve and wilmer change. She will require cont'd OT tx to improve her fxl capacity while hospitalized and at [facility].  Rec SNF placement.    Anticipated Discharge Disposition (OT): skilled nursing facility

## 2024-11-15 NOTE — PLAN OF CARE
Goal Outcome Evaluation:        Problem: Adult Inpatient Plan of Care  Goal: Plan of Care Review  Outcome: Progressing  Flowsheets (Taken 11/15/2024 9272)  Progress: no change  Outcome Evaluation: ABILIOO, NAVI she is forgetful and will ask the same question. She requested something to help her sleep last night, Dr placed order for melatonin and seroquel, pt slept well overnight.  Morning labs revealed she was low on phosphorus and H&H below 7. One unit PRBC's ordered to be given. Pt has mcclain catheter and Urology says to continue mcclain. She has a renal ULt this am.  Pt is a little confused this am. We called her daughter and got the consent signed for her PRBC to be given.  SR 75-87 on tele. safety maintained, care plan ongoing.

## 2024-11-15 NOTE — PROGRESS NOTES
[facility] Medicine Services  INPATIENT PROGRESS NOTE    Patient Name: Jennifer Gomes  Date of Admission: 11/12/2024  Today's Date: 11/15/24  Length of Stay: 3  Primary Care Physician: Provider, No Known    Subjective   Chief Complaint: Abdominal pain  Abdominal Pain    Weakness - Generalized  Associated symptoms include abdominal pain.      84-year-old female with history of hypertension, chronic kidney disease stage III, chronic pain, dementia, hypothyroidism, GERD, hyperlipidemia, discharged from the hospital October 2024, at that time admitted for acute renal insufficiency associated to bilateral hydronephrosis.  Patient had a Smith catheter placed.    Patient was admitted 11/12/2024 after she accidentally pulled out Smith catheter.  Found to have acute renal insufficiency, urinary tract infection; I started taking care of this patient 11/13/2024    No family member present.  Had episode of atrial fibrillation 11/13/24, required Cardizem drip and transferred to 4 floor, converted to sinus rhythm at the end of the day.    She was started on Lovenox full anticoagulation.    Smith catheter in place  Urology evaluated patient recommending to repeat imaging studies renal ultrasound 11/15.   Per nurse, patient removed peripheral line.  A transfusion of 1 unit packed red blood cells has been ordered and not able to administer so far due to no access.        Review of Systems   Gastrointestinal:  Positive for abdominal pain.      All pertinent negatives and positives are as above. All other systems have been reviewed and are negative unless otherwise stated.     Objective    Temp:  [98.5 °F (36.9 °C)-98.8 °F (37.1 °C)] 98.5 °F (36.9 °C)  Heart Rate:  [74-81] 74  Resp:  [18] 18  BP: (119-147)/(42-62) 146/54  Physical Exam  Constitutional:       Appearance: Alert, oriented to place time and person.  No respiratory distress.  HENT:      Head: Normocephalic and atraumatic.      Nose:  Nose normal.      Mouth/Throat:      Mouth: Mucous membranes are moist.   Eyes:      Extraocular Movements: Extraocular movements intact.      Conjunctiva/sclera: Conjunctivae normal.   Cardiovascular:      Rate and Rhythm: Normal rate and regular rhythm.      Pulses: Normal pulses.   Pulmonary:      Effort: No respiratory distress.      Breath sounds: Normal breath sounds. No wheezing, rhonchi or rales.   Abdominal:      General: Abdomen is flat. Bowel sounds are normal.      Palpations: Abdomen is soft.      Tenderness: There is no guarding or rebound.   Genitourinary.  Smith catheter in place  Extremities:  No lower extremity edema.  Skin:     Capillary Refill: Capillary refill takes less than 2 seconds.      Coloration: Skin is not jaundiced.      Findings: No rash.   Neurological:      General: No focal deficit present.      Mental Status: Patient is alert, disoriented to situation.     Sensory: No sensory deficit.      Motor: No weakness.      Coordination: Coordination normal. l.           Results Review:  I have reviewed the labs, radiology results, and diagnostic studies.    Laboratory Data:   Results from last 7 days   Lab Units 11/15/24  0313 11/14/24  1614 11/13/24  0441 11/12/24  1258   WBC 10*3/mm3  --  6.45 9.96 12.11*   HEMOGLOBIN g/dL 6.6* 7.0* 7.7* 8.8*   HEMATOCRIT % 20.8* 21.9* 23.2* 27.7*   PLATELETS 10*3/mm3  --  139* 140 175        Results from last 7 days   Lab Units 11/15/24  0313 11/14/24  1614 11/14/24  0501 11/13/24  0441 11/12/24  1258   SODIUM mmol/L 141 141 140   < > 142   POTASSIUM mmol/L 4.7 2.8*  3.0* 2.8*   < > 3.3*   CHLORIDE mmol/L 117* 114* 113*   < > 109*   CO2 mmol/L 16.0* 19.0* 17.0*   < > 17.0*   BUN mg/dL 27* 27* 29*   < > 42*   CREATININE mg/dL 1.37* 1.55* 1.59*   < > 2.46*   CALCIUM mg/dL 9.2 9.5 9.4   < > 10.7*   BILIRUBIN mg/dL  --   --   --   --  0.3   ALK PHOS U/L  --   --   --   --  93   ALT (SGPT) U/L  --   --   --   --  6   AST (SGOT) U/L  --   --   --   --  9  "  GLUCOSE mg/dL 117* 128* 115*   < > 187*    < > = values in this interval not displayed.       Culture Data:   No results found for: \"BLOODCX\", \"URINECX\", \"WOUNDCX\", \"MRSACX\", \"RESPCX\", \"STOOLCX\"    Radiology Data:   Imaging Results (Last 24 Hours)       ** No results found for the last 24 hours. **            I have reviewed the patient's current medications.     Assessment/Plan   Assessment  Active Hospital Problems    Diagnosis     **Acute renal failure superimposed on stage 3 chronic kidney disease     Paroxysmal atrial fibrillation     Acute cystitis     Hypercalcemia     Dementia     Chronic constipation     Anemia     Essential hypertension        Acute kidney injury/renal insufficiency on chronic kidney disease stage IIIb resolved  Paroxysmal atrial fibrillation  Right hydronephrosis  Acute on chronic anemia  History of bilateral hydronephrosis  Urinary tract infection, E. coli  Constipation  Hypokalemia  Hypophosphatemia  Chronic normocytic anemia        Potassium has improved to 4.7.  Phosphorus 2.1.  Creatinine 1.37, down from 2.46  Sodium 141    Hemoglobin 6.6; there is no report of hematuria, no melena, no hematemesis, no rectal bleeding.    Urine culture with more than 100,000 Ecoli pansensitive  Blood cultures negative so far    B12 523    Treatment Plan  Ceftriaxone day #4; will change antibiotics to cefdinir.  Complete 7 days of treatment    Transfuse 1 unit packed red blood cells.  Following balloon hematocrit.    Renal ultrasound today.  Continue Smith catheter.    Follow renal function and electrolytes.  Replace potassium and phosphorus per protocol.    Due to acute on chronic anemia.  Anticoagulation will be placed on hold.  Patient may not be a candidate for long-term anticoagulation given anemia.    Patient will benefit from skilled nursing facility.   to reassess and decide on placement.  Patient may be discharged after transfusion and ultrasound reviewed.      Medical Decision " Making  Number and Complexity of problems: More than 10, moderate to high complexity  Differential Diagnosis: See above    Conditions and Status        Condition is unchanged.     [facility] Data  External documents reviewed: None  Cardiac tracing (EKG, telemetry) interpretation: Atrial fibrillation  Radiology interpretation: Radiology reports reviewed  Labs reviewed: Yes  Any tests that were considered but not ordered: No     Decision rules/scores evaluated (example [id number], Wells, etc): See chart     Discussed with: Patient and nurse     Care Planning  Shared decision making: With patient  Code status and discussions: Full code    Disposition  Social Determinants of Health that impact treatment or disposition: None  I expect the patient to be discharged to skilled facility in 1 days.         Electronically signed by Cuba Espitia MD, 11/15/24, 10:21 CST.

## 2024-11-15 NOTE — DISCHARGE PLACEMENT REQUEST
"Jennifer Soliman (84 y.o. Female)       Date of Birth   1940    Social Security Number       Address   [address] [facility] [address]    Home Phone   [phone]    MRN   [id number]       Restorationism   Rastafari    Marital Status                               Admission Date   24    Admission Type   Emergency    Admitting Provider   Cuba Espitia MD    Attending Provider   Cuba Espitia MD    Department, Room/Bed   [facility] 4B, 442/1       Discharge Date       Discharge Disposition       Discharge Destination                                 Attending Provider: Cuba Espitia MD    Allergies: Ropinirole Hcl, Codeine, Definity [Perflutren Lipid Microsphere], Ambien [Zolpidem], Eszopiclone, Pregabalin, Tizanidine    Isolation: None   Infection: None   Code Status: CPR    Ht: 157.5 cm (62\")   Wt: 70.3 kg (155 lb)    Admission Cmt: None   Principal Problem: Acute renal failure superimposed on stage 3 chronic kidney disease [N17.9,N18.30]                   Active Insurance as of 2024       Primary Coverage       Payor Plan Insurance Group Employer/Plan Group    [facility] MEDICARE REPLACEMENT [facility] MED ADV GROUP [id number]       Payor Plan Address Payor Plan Phone Number Payor Plan Fax Number Effective Dates    [address] [address]   2024 - None Entered    [facility] [id number]         Subscriber Name Subscriber Birth Date Member ID       JENNIFER SOLIMAN 1940 [id number]                     Emergency Contacts        (Rel.) Home Phone Work Phone Mobile Phone    LiangNancy yay (Daughter) [phone] -- --    Adams Soliman (Spouse) [phone] -- [phone]                 Occupational Therapy Notes (last 48 hours)        Jennifer Briceno, OTR/L at 11/15/24 1544          Patient Name: Jennifer Soliman  : 1940    MRN: [id number]                              Today's Date: 11/15/2024       Admit Date: 2024    Visit Dx:     ICD-10-CM " ICD-9-CM   1. TOMÁS (acute kidney injury)  N17.9 584.9   2. Acute cystitis without hematuria  N30.00 595.0   3. Impaired mobility [Z74.09]  Z74.09 799.89     Patient Active Problem List   Diagnosis    Gastroesophageal reflux disease    Spinal stenosis, lumbar region, without neurogenic claudication    Erosion of vaginal mesh    Adenocarcinoma of endometrium    S/P hysterectomy with oophorectomy    Encounter for follow-up surveillance of endometrial cancer    History of radiation therapy    Non-traumatic rhabdomyolysis    Acute renal failure superimposed on stage 3 chronic kidney disease    Medical non-compliance, does not take narcotics as prescribed    Chronic constipation    Chronic pain syndrome    Chronic prescription opiate use    Anemia    Hypothyroidism (acquired)    Essential hypertension    Venous insufficiency of both lower extremities    Chronic intractable headache    RAFAL (obstructive sleep apnea)    Acute encephalopathy due to UTI    Toxic metabolic encephalopathy    Polypharmacy    Frequent falls    Grade III internal hemorrhoids    External hemorrhoids    Colitis    Moderate malnutrition    Transaminitis    Acute UTI (urinary tract infection)    Polypharmacy    UTI (urinary tract infection)    Dementia    Hypokalemia    Sepsis due to urinary tract infection    Cardiopulmonary arrest    E coli bacteremia    Anemia requiring transfusions    Bilateral hydronephrosis    Acute urinary retention    TOMÁS (acute kidney injury)    Acute cystitis    Hypercalcemia    Paroxysmal atrial fibrillation     Past Medical History:   Diagnosis Date    Anxiety     Arthritis     Bronchitis     Cancer     uterine    Chronic pain     Depression     Disease of thyroid gland     Fibromyalgia     GERD (gastroesophageal reflux disease)     Headache     History of transfusion     AS     Hyperlipidemia     Hypertension     Incontinence     Insomnia     Leg pain     Lumbar stenosis     Migraines     Peptic ulcer     Restless  legs     Sleep apnea     NO C-PAP    UTI (urinary tract infection)     Vaginal bleeding      Past Surgical History:   Procedure Laterality Date    BLADDER REPAIR  2011    MESH HAD TO BE REMOVED IN 2013    BREAST BIOPSY Right 2017    benign    BREAST CYST EXCISION Left 1982    CARDIAC CATHETERIZATION      CARPAL TUNNEL RELEASE      CATARACT EXTRACTION W/ INTRAOCULAR LENS  IMPLANT, BILATERAL      CHOLECYSTECTOMY WITH INTRAOPERATIVE CHOLANGIOGRAM N/A 9/7/2023    Procedure: CHOLECYSTECTOMY LAPAROSCOPIC INTRAOPERATIVE CHOLANGIOGRAM;  Surgeon: Gerardo Arciniega MD;  Location:  [facility] OR;  Service: General;  Laterality: N/A;    COLONOSCOPY      COLONOSCOPY N/A 10/01/2021    Procedure: COLONOSCOPY WITH ANESTHESIA;  Surgeon: Tom Velasco DO;  Location:  [facility] ENDOSCOPY;  Service: Gastroenterology;  Laterality: N/A;  pre: change in bowel habits  post: diverticulosis. hemorrhoids.   Olivia Mora APRN        CYSTECTOMY      D & C HYSTEROSCOPY N/A 11/06/2017    Procedure: DILATATION AND CURETTAGE HYSTEROSCOPY;  Surgeon: Shasta Madrigal MD;  Location: [facility] OR;  Service:     DILATION AND CURETTAGE, DIAGNOSTIC / THERAPEUTIC  2008    ENDOSCOPY  09/23/2010    Short segment of Arriola's,Moderate chroninc esophagogastritis and negative H.pylori    ENDOSCOPY N/A 09/25/2017    Procedure: ESOPHAGOGASTRODUODENOSCOPY WITH ANESTHESIA;  Surgeon: Tom Velasco DO;  Location: [facility] ENDOSCOPY;  Service:     EYE SURGERY      RETINA    HEMORRHOIDECTOMY SIGMOIDOSCOPY N/A 3/21/2023    Procedure: HEMORRHOIDECTOMY WITH EXAM UNDER ANESTHESIA;  Surgeon: Holly Chavez MD;  Location:  [facility] OR;  Service: General;  Laterality: N/A;    HYSTERECTOMY  12/20/2017    ORIF TIBIA/FIBULA FRACTURES Left 2000    TRANSVAGINAL TAPING SUSPENSION N/A 11/06/2017    Procedure: VAGINAL MESH REVISION;  Surgeon: Shasta Madrigal MD;  Location:  [facility] OR;  Service:     VAGINAL MESH REVISION  2013      General Information       Row Name 11/15/24 7510     "      OT Time and Intention    Document Type evaluation  -     Mode of Treatment occupational therapy  -     Patient Effort good  -       Row Name 11/15/24 1442          General Information    Patient Profile Reviewed yes  -     Prior Level of Function --  Pt reports she \"hasn't stood in months,\" but was not able to provide clear PLOF  -     Existing Precautions/Restrictions fall;other (see comments)  -       Row Name 11/15/24 1442          Living Environment    People in Home spouse;child(sebastian), adult  -       Row Name 11/15/24 1442          Home Main Entrance    Number of Stairs, Main Entrance four  -       Row Name 11/15/24 1442          Cognition    Orientation Status (Cognition) oriented x 4  -       Row Name 11/15/24 1442          Safety Issues/Impairments Affecting Functional Mobility    Safety Issues Affecting Function (Mobility) at risk behavior observed;friction/shear risk;insight into deficits/self-awareness;judgment  -     Impairments Affecting Function (Mobility) balance;range of motion (ROM);endurance/activity tolerance;strength;pain  -               User Key  (r) = Recorded By, (t) = Taken By, (c) = Cosigned By      Initials Name Provider Type     Jennifer Briceno, OTR/L Occupational Therapist                     Mobility/ADL's       Row Name 11/15/24 1442          Bed Mobility    Bed Mobility sidelying-sit;rolling left;rolling right  -     Rolling Left [place] (Bed Mobility) supervision  -     Rolling Right [place] (Bed Mobility) supervision  -     Supine-Sit [place] (Bed Mobility) minimum assist (75% patient effort)  -     Assistive Device (Bed Mobility) repositioning sheet;head of bed elevated;bed rails  -       Row Name 11/15/24 1442          Transfers    Transfers sit-stand transfer;stand-sit transfer  -     Comment, (Transfers) Pt not able to achieve fully upright posture.  -       Row Name 11/15/24 1442          Sit-Stand Transfer    Sit-Stand " [place] (Transfers) maximum assist (25% patient effort)  -       Row Name 11/15/24 1442          Activities of Daily Living    BADL Assessment/Intervention lower body dressing;toileting  -CH       Row Name 11/15/24 [facility]          Lower Body Dressing Assessment/Training    [place] Level (Lower Body Dressing) contact guard assist;socks;don  -[facility] Name 11/15/24 1442          Toileting Assessment/Training    [place] Level (Toileting) maximum assist (25% patient effort);adjust/manage clothing;perform perineal hygiene;change pad/brief  -     Position (Toileting) supine  -               User Key  (r) = Recorded By, (t) = Taken By, (c) = Cosigned By      Initials Name Provider Type    Jennifer Villegas OTR/L Occupational Therapist                   Obj/Interventions       Row Name 11/15/24 [facility]          Sensory Assessment (Somatosensory)    Sensory Assessment (Somatosensory) UE sensation intact  -CH       Row Name 11/15/24 [facility]          Vision Assessment/Intervention    Visual Impairment/Limitations WFL  -CH       Row Name 11/15/24 [facility]          Range of Motion Comprehensive    General Range of Motion bilateral upper extremity ROM WFL  -CH       Row Name 11/15/24 [facility]          Strength Comprehensive (MMT)    Comment, General Manual Muscle Testing (MMT) Assessment grossly 4-/5  -CH       [facility] Name 11/15/24 [facility]          Balance    Static Sitting Balance supervision  -     Dynamic Sitting Balance contact guard;minimal assist  -     Position, Sitting Balance unsupported;sitting edge of bed  -     Balance Interventions sitting;sit to stand  -               User Key  (r) = Recorded By, (t) = Taken By, (c) = Cosigned By      Initials Name Provider Type    Jennifer Villegas OTR/L Occupational Therapist                   Goals/Plan       [facility] Name 11/15/24 [facility]          Transfer Goal 1 (OT)    Activity/Assistive Device (Transfer Goal 1, OT)  sit-to-stand/stand-to-sit;bed-to-chair/chair-to-bed;commode, 3-in-1;walker, rolling  -     [place] Level/Cues Needed (Transfer Goal 1, OT) moderate assist (50-74% patient effort);verbal cues required;nonverbal cues (demo/gesture) required  -     Time Frame (Transfer Goal 1, OT) long term goal (LTG);by discharge  -     Progress/Outcome (Transfer Goal 1, OT) new goal  -       Row Name 11/15/24 1442          Dressing Goal 1 (OT)    Activity/Device (Dressing Goal 1, OT) lower body dressing  -     [place]/Cues Needed (Dressing Goal 1, OT) moderate assist (50-74% patient effort)  -     Time Frame (Dressing Goal 1, OT) long term goal (LTG);by discharge  -     Progress/Outcome (Dressing Goal 1, OT) new [facility]  -       Row Name 11/15/24 1442          Toileting Goal 1 (OT)    Activity/Device (Toileting Goal 1, OT) toileting skills, all;adjust/manage clothing;perform perineal hygiene;commode, 3-in-1  -     [place] Level/Cues Needed (Toileting Goal 1, OT) moderate assist (50-74% patient effort)  -     Time Frame (Toileting Goal 1, OT) long term goal (LTG);by discharge  -     Progress/Outcome (Toileting Goal 1, OT) new [facility]  -       Row Name 11/15/24 9943          Therapy Assessment/Plan (OT)    Planned Therapy Interventions (OT) activity tolerance training;adaptive equipment training;BADL retraining;functional balance retraining;occupation/activity based interventions;patient/caregiver education/training;strengthening exercise;transfer/mobility retraining;wheelchair assessment/training;ROM/therapeutic exercise  -               User Key  (r) = Recorded By, (t) = Taken By, (c) = Cosigned By      Initials Name Provider Type     Jennifer Briceno OTR/L Occupational Therapist                   Clinical Impression       Row Name 11/15/24 1472          Pain Assessment    Additional Documentation Pain Scale: FACES Pre/Post-Treatment (Group)  -       Row Name 11/15/24 0322          Pain Scale:  FACES Pre/Post-Treatment    Pain: FACES Scale, Pretreatment 0-->no hurt  -     Posttreatment Pain Rating 6-->hurts even more  -     Pre/Posttreatment Pain Comment generalized BLEs  -       Row Name 11/15/24 1442          Plan of Care Review    Plan of Care Reviewed With patient  -     Progress no change  -     Outcome Evaluation OT eval complete.  Pt. is AxO x 2 & while is conversational she is also confused.  Ms. Gomes was able to follow commands and come to EOB at S.  She has at S-[facility] to KARLOS socks.  Ms. Gomes was then able to scoot toward EOB and to HOB utilizing good dynamic sitting posture and vc's to reposition herself.  She demo's generalized weakness and debility.  OTR and pt attempted to stand x 2 and pt demo'd weakness, fear of falling, inihibition, poor body mechs, and was ultimately unsuccessful.  Found pt to have had incontinent episode so assisted her to supine to complete hygieve and wilmer change. She will require cont'd OT tx to improve her fxl capacity while hospitalized and at [place].  Rec SNF placement.  -       Row Name 11/15/24 1442          Therapy Assessment/Plan (OT)    Patient/Family Therapy Goal Statement (OT) DC to SNF  -     Rehab Potential (OT) limited  -     Criteria for Skilled Therapeutic Interventions Met (OT) yes;skilled treatment is necessary  -     Therapy Frequency (OT) 5 times/wk  -     Predicted Duration of Therapy Intervention (OT) 10 days  -       Row Name 11/15/24 1442          Therapy Plan Review/Discharge Plan (OT)    Anticipated Discharge Disposition (OT) skilled nursing facility  -       Row Name 11/15/24 1442          Positioning and Restraints    Pre-Treatment Position in bed  -     Post Treatment Position bed  -     In Bed fowlers;call light within reach;encouraged to call for assist;side rails up x3;with [facility] [place]  -               User Key  (r) = Recorded By, (t) = Taken By, (c) = Cosigned By      Initials Name Provider Type     Jennifer Briceno,  OTR/L Occupational Therapist                   Outcome Measures       Row Name 11/15/24 1442          How much help from another is currently needed...    Putting on and taking off regular lower body clothing? 2  -CH     Bathing (including washing, rinsing, and drying) 2  -CH     Toileting (which includes using toilet bed pan or urinal) 2  -CH     Putting on and taking off regular upper body clothing 2  -CH     Taking care of personal grooming (such as brushing teeth) 3  -CH     Eating meals 4  -CH     AM-PAC 6 Clicks Score (OT) 15  -CH       Row Name 11/15/24 0800          How much help from another person do you currently need...    Turning from your back to your side while in flat bed without using bedrails? 4  -AR     Moving from lying on back to sitting on the side of a flat bed without bedrails? 3  -AR     Moving to and from a bed to a chair (including a wheelchair)? 2  -AR     Standing up from a chair using your arms (e.g., wheelchair, bedside chair)? 2  -AR     Climbing 3-5 steps with a railing? 2  -AR     To walk in hospital room? 2  -AR     AM-PAC 6 Clicks Score (PT) 15  -AR     Highest Level of Mobility Goal 4 --> Transfer to chair/commode  -AR       Row Name 11/15/24 1442          Functional Assessment    Outcome Measure Options AM-PAC 6 Clicks Daily Activity (OT)  -               User Key  (r) = Recorded By, (t) = Taken By, (c) = Cosigned By      Initials Name Provider Type     Jennifer Briceno, OTR/L Occupational Therapist    Cassidy Nation, RN Registered Nurse                    Occupational Therapy Education       Title: PT OT SLP Therapies (In Progress)       Topic: Occupational Therapy (In Progress)       Point: ADL training (Done)       Description:   Instruct learner(s) on proper safety adaptation and remediation techniques during self care or transfers.   Instruct in proper use of assistive devices.                  Learning Progress Summary            Patient Acceptance, E, VU by  at  11/15/2024 1544                      Point: Home exercise program (Not Started)       Description:   Instruct learner(s) on appropriate technique for monitoring, assisting and/or progressing therapeutic exercises/activities.                  Learner Progress:  Not documented in this visit.              Point: Precautions (Done)       Description:   Instruct learner(s) on prescribed precautions during self-care and functional transfers.                  Learning Progress Summary            Patient Acceptance, E, VU by  at 11/15/2024 1544                      Point: Body mechanics (Done)       Description:   Instruct learner(s) on proper positioning and spine alignment during self-care, functional mobility activities and/or exercises.                  Learning Progress Summary            Patient Acceptance, E, VU by  at 11/15/2024 1544                                      User Key       Initials Effective Dates Name Provider Type Discipline     07/11/23 -  Jennifer Briceno, OTR/L Occupational Therapist OT                  OT Recommendation and Plan  Planned Therapy Interventions (OT): activity tolerance training, adaptive equipment training, BADL retraining, functional balance retraining, occupation/activity based interventions, patient/caregiver education/training, strengthening exercise, transfer/mobility retraining, wheelchair assessment/training, ROM/therapeutic exercise  Therapy Frequency (OT): 5 times/wk  Plan of Care Review  Plan of Care Reviewed With: patient  Progress: no change  Outcome Evaluation: OT eval complete.  Pt. is AxO x 2 & while is conversational she is also confused.  Ms. Gomes was able to follow commands and come to EOB at S.  She has at S-[facility] to KARLOS socks.  Ms. oGmes was then able to scoot toward EOB and to HOB utilizing good dynamic sitting posture and vc's to reposition herself.  She demo's generalized weakness and debility.  OTR and pt attempted to stand x 2 and pt demo'd weakness, fear  of falling, inihibition, poor body mechs, and was ultimately unsuccessful.  Found pt to have had incontinent episode so assisted her to supine to complete hygieve and wilmer change. She will require cont'd OT tx to improve her fxl capacity while hospitalized and at [place].  Rec SNF placement.     Time Calculation:         Time Calculation- OT       Row Name 11/15/24 1442             Time Calculation- OT    OT Start Time 1442  -CH      OT Stop Time 1530  -CH      OT Time Calculation (min) 48 min  -CH      OT Received On 11/15/24  -CH      OT Goal Re-Cert Due Date 11/25/24  -CH         Untimed Charges    OT Eval/Re-eval Minutes 48  -CH         Total Minutes    Untimed Charges Total Minutes 48  -CH       Total Minutes 48  -CH                User Key  (r) = Recorded By, (t) = Taken By, (c) = Cosigned By      Initials Name Provider Type     Jennifer Briceno OTR/L Occupational Therapist                           ZEESHAN Douglas/L  11/15/2024    Electronically signed by Jennifer Briceno OTR/L at 11/15/24 1544       Briceno, Jennifer K, OTR/L at 11/15/24 1544          Goal Outcome Evaluation:  Plan of Care Reviewed With: patient        Progress: no change  Outcome Evaluation: OT eval complete.  Pt. is AxO x 2 & while is conversational she is also confused.  Ms. Gomes was able to follow commands and come to EOB at S.  She has at S[facility] to KARLOS socks.  Ms. Gomes was then able to scoot toward EOB and to HOB utilizing good dynamic sitting posture and vc's to reposition herself.  She demo's generalized weakness and debility.  OTR and pt attempted to stand x 2 and pt demo'd weakness, fear of falling, inihibition, poor body mechs, and was ultimately unsuccessful.  Found pt to have had incontinent episode so assisted her to supine to complete hygieve and wilmer change. She will require cont'd OT tx to improve her fxl capacity while hospitalized and at [place].  Rec SNF placement.    Anticipated Discharge Disposition (OT): skilled nursing  facility                          Electronically signed by Jennifer Briceno, OTR/L at 11/15/24 5997

## 2024-11-15 NOTE — DISCHARGE PLACEMENT REQUEST
"Jennifer Soliman (84 y.o. Female)       Date of Birth   1940    Social Security Number       Address   [address] [facility] [address]    Home Phone   [phone]    MRN   [id number]       Rastafari   Bahai    Marital Status                               Admission Date   11/12/24    Admission Type   Emergency    Admitting Provider   Cuba Espitia MD    Attending Provider   Cuba Espitia MD    Department, Room/Bed   [facility] 4B, 442/1       Discharge Date       Discharge Disposition       Discharge Destination                                 Attending Provider: Cuba Espitia MD    Allergies: Ropinirole Hcl, Codeine, Definity [Perflutren Lipid Microsphere], Ambien [Zolpidem], Eszopiclone, Pregabalin, Tizanidine    Isolation: None   Infection: None   Code Status: CPR    Ht: 157.5 cm (62\")   Wt: 70.3 kg (155 lb)    Admission Cmt: None   Principal Problem: Acute renal failure superimposed on stage 3 chronic kidney disease [N17.9,N18.30]                   Active Insurance as of 11/12/2024       Primary Coverage       Payor Plan Insurance Group Employer/Plan Group    [facility] MEDICARE REPLACEMENT [facility] MED ADV GROUP [id number]       Payor Plan Address Payor Plan Phone Number Payor Plan Fax Number Effective Dates    [address] [address]   5/1/2024 - None Entered    [facility] [id number]         Subscriber Name Subscriber Birth Date Member ID       JENNIFER SOLIMAN 1940 [id number]                     Emergency Contacts        (Rel.) Home Phone Work Phone Mobile Phone    Liang,Ivonne (Daughter) [phone] -- --    MireyaAdams (Spouse) [phone] -- [phone]                 History & Physical        Eleazar Chavarria DO at 11/12/24 [address] [place] [address] Medicine Services  HISTORY AND PHYSICAL    Date of Admission: 11/12/2024  Primary Care Physician: Provider, No Known    Subjective   Primary Historian: " patient/records    Chief Complaint: Abdominal pain, pulled out Smith catheter    History of Present Illness  Patient is an 84-year-old female with a history of hypertension, CKD 3, chronic pain syndrome, dementia, hypothyroidism, hyperlipidemia, GERD.  She was recently hospitalized here at our facility from 10/5 through 10/15.  At that time she had a acute on chronic renal failure in the setting of acute urinary retention causing bilateral hydronephrosis and needing Smith catheter placement.  The initial urinary retention had led to UTI and sepsis at time of admission prior to Smith catheter.  She had to be treated in the ICU initially due to degree of illness and sepsis.  She ended up growing E. coli bacteremia but Morganella in her urine.  Both sensitive to ceftriaxone.  She was planned for 14 days of antibiotics at discharge and was discharged to a short-term rehab/nursing facility.  Smith catheter to stay in place for outpatient urology follow-up.  At time of discharge her creatinine was 1.2 with a GFR 43.  Since discharge from here she must have returned home.  Patient seen in ER 24 at request of ER provider.  No family in the room.  Reports are she was squirming around at home in bed and accidentally pulled out her Smith catheter with balloon fully intact.  Family reported finding stool around the bulb of the catheter.  Patient also had crusted blood in her groin per report.  On arrival here labs show again that she has acute on chronic renal failure with creatinine 2.4 and a GFR of 19.  Urine again appears to be infectious with too numerous to count white cells and 4+ bacteria.  Large blood, nitrates, leukocyte esterase.  Her lactic acid is normal.  She is not febrile.  She is not hypotensive.  Does not appear septic.  She is alert and interactive but slow to speak when I evaluated her.  She tells me she is in a hospital in [place].  She did remember her catheter coming out at home.  She does not recall how  or why she ended up in the ER however.  Her main complaint is abdominal discomfort.  She currently denies chest pain or shortness of breath.  No reported nausea or vomiting.  She is neuro intact.  She was given of her cc LR and a dose of Zosyn.  We have been asked to admit for further care.        Review of Systems   Otherwise complete ROS reviewed and negative except as mentioned in the HPI.    Past Medical History:   Past Medical History:   Diagnosis Date    Anxiety     Arthritis     Bronchitis     Cancer     uterine    Chronic pain     Depression     Disease of thyroid gland     Fibromyalgia     GERD (gastroesophageal reflux disease)     Headache     History of transfusion     AS     Hyperlipidemia     Hypertension     Incontinence     Insomnia     Leg pain     Lumbar stenosis     Migraines     Peptic ulcer     Restless legs     Sleep apnea     NO C-PAP    UTI (urinary tract infection)     Vaginal bleeding      Past Surgical History:  Past Surgical History:   Procedure Laterality Date    BLADDER REPAIR      MESH HAD TO BE REMOVED IN     BREAST BIOPSY Right 2017    benign    BREAST CYST EXCISION Left     CARDIAC CATHETERIZATION      CARPAL TUNNEL RELEASE      CATARACT EXTRACTION W/ INTRAOCULAR LENS  IMPLANT, BILATERAL      CHOLECYSTECTOMY WITH INTRAOPERATIVE CHOLANGIOGRAM N/A 2023    Procedure: CHOLECYSTECTOMY LAPAROSCOPIC INTRAOPERATIVE CHOLANGIOGRAM;  Surgeon: Gerardo Arciniega MD;  Location: [facility] OR;  Service: General;  Laterality: N/A;    COLONOSCOPY      COLONOSCOPY N/A 10/01/2021    Procedure: COLONOSCOPY WITH ANESTHESIA;  Surgeon: Tom Velasco DO;  Location: [facility] ENDOSCOPY;  Service: Gastroenterology;  Laterality: N/A;  pre: change in bowel habits  post: diverticulosis. hemorrhoids.   Olivia Mora APRN        CYSTECTOMY      D & C HYSTEROSCOPY N/A 2017    Procedure: DILATATION AND CURETTAGE HYSTEROSCOPY;  Surgeon: Shasta Madrigal MD;  Location: [facility] OR;   Service:     DILATION AND CURETTAGE, DIAGNOSTIC / THERAPEUTIC  2008    ENDOSCOPY  09/23/2010    Short segment of Arriola's,Moderate chroninc esophagogastritis and negative H.pylori    ENDOSCOPY N/A 09/25/2017    Procedure: ESOPHAGOGASTRODUODENOSCOPY WITH ANESTHESIA;  Surgeon: Tom Velasco DO;  Location: [facility] ENDOSCOPY;  Service:     EYE SURGERY      RETINA    HEMORRHOIDECTOMY SIGMOIDOSCOPY N/A 3/21/2023    Procedure: HEMORRHOIDECTOMY WITH EXAM UNDER ANESTHESIA;  Surgeon: Holly Chavez MD;  Location: [facility] OR;  Service: General;  Laterality: N/A;    HYSTERECTOMY  12/20/2017    ORIF TIBIA/FIBULA FRACTURES Left 2000    TRANSVAGINAL TAPING SUSPENSION N/A 11/06/2017    Procedure: VAGINAL MESH REVISION;  Surgeon: Shasta Madrigal MD;  Location: [facility] OR;  Service:     VAGINAL MESH REVISION  2013     Social History:  reports that she has never smoked. She has never used smokeless tobacco. She reports that she does not currently use alcohol. She reports that she does not use drugs.    Family History: family history includes Cancer in her paternal grandmother; Diabetes in her mother and sister; Lung cancer in her paternal grandfather; Lymphoma in her brother; Multiple myeloma in her mother; Ovarian cancer in her paternal aunt; Prostate cancer in her brother; Stroke in her father.       Allergies:  Allergies   Allergen Reactions    Ropinirole Hcl Shortness Of Breath    Codeine Itching and Mental Status Change    Definity [Perflutren Lipid Microsphere] Other (See Comments)     Severe back pain    Ambien [Zolpidem] Other (See Comments)     HYPER     Eszopiclone Other (See Comments)     MAKES PT HYPER     Pregabalin Dizziness    Tizanidine Other (See Comments)     Terrible nightmares       Medications:  Prior to Admission medications    Medication Sig Start Date End Date Taking? Authorizing Provider   acetaminophen (TYLENOL) 325 MG tablet Take 2 tablets by mouth Every 6 (Six) Hours As Needed for Mild Pain.     "Tamiko Gaviria MD   amLODIPine (NORVASC) 5 MG tablet Take 1 tablet by mouth Daily. 10/15/24   Abdirahman Schroeder MD   atorvastatin (LIPITOR) 40 MG tablet Take 1 tablet by mouth Daily.    Tamiko Gaviria MD   carvedilol (COREG) 12.5 MG tablet Take 1 tablet by mouth 2 (Two) Times a Day With Meals. 8/28/19   Tamiko Gaviria MD   donepezil (ARICEPT) 10 MG tablet Take 1 tablet by mouth Every Night. 10/18/22   Tamiko Gaviria MD   ergocalciferol (ERGOCALCIFEROL) 75708 units capsule Take 1 capsule by mouth 1 (One) Time Per Week. Saturday 4/17/19   Tamiko Gaviria MD   lansoprazole (PREVACID) 30 MG capsule Take 1 capsule by mouth Daily.    Tamiko Gaviria MD   levothyroxine (SYNTHROID, LEVOTHROID) 50 MCG tablet Take 1 tablet by mouth Daily.    Tamiko Gaviria MD   naloxone (NARCAN) 4 MG/0.1ML nasal spray 1 spray into the nostril(s) as directed by provider As Needed for Opioid Reversal.    Tamiko Gaviria MD   polyethylene glycol 17 g packet Take 17 g by mouth Daily. 10/15/24   Abdirahman Schroeder MD   sennosides-docusate (PERICOLACE) 8.6-50 MG per tablet Take 2 tablets by mouth 2 (Two) Times a Day. 10/15/24   Abdirahman Schroeder MD   sertraline (ZOLOFT) 25 MG tablet Take 1 tablet by mouth Daily.    Tamiko Gaviria MD   SUMAtriptan (IMITREX) 50 MG tablet Take 1 tablet by mouth Daily As Needed for Migraine. Take one tablet at onset of headache. May repeat dose one time in 2 hours if headache not relieved.    Tamiko Gaviria MD     I have utilized all available immediate resources to obtain, update, or review the patient's current medications (including all prescriptions, over-the-counter products, herbals, cannabis/cannabidiol products, and vitamin/mineral/dietary (nutritional) supplements).    Objective     Vital Signs: /59 (BP Location: Left arm, Patient Position: Lying)   Pulse 96   Temp 97.1 °F (36.2 °C) (Axillary)   Resp 16   Ht 157.5 cm (62\")  "  Wt 70.3 kg (155 lb)   LMP  (LMP Unknown)   SpO2 100%   BMI 28.35 kg/m²   Physical Exam   GEN: sleeping but easily awoken, interactive, answering questions, speech fluent, NAD. Appears chronically ill/frail  HEENT: EOMI, Anicteric, Trachea midline, MMD  Lungs:  no wheezing/rales/rhonchi  Heart: RRR, +S1/s2, no rub  ABD: soft, non-distended, +BS, no guarding/rebound  Extremities: atraumatic, no cyanosis, no significant pitting LE edema  Skin: no petechiae   Neuro: AAOx2, WHITESIDE, speech fluent but slow, no obvious focal deficits, gait not tested  Psych: flat affect        Results Reviewed:  Lab Results (last 24 hours)       Procedure Component Value Units Date/Time    Blood Culture - Blood, Arm, Left [[id number]] Collected: 11/12/24 1453    Specimen: Blood from Arm, Left Updated: 11/12/24 1503    Urinalysis With Culture If Indicated - Straight Cath [[id number]]  (Abnormal) Collected: 11/12/24 1436    Specimen: Urine from Straight Cath Updated: 11/12/24 1456     Color, UA Yellow     Appearance, UA Turbid     pH, UA 5.5     Specific Gravity, UA 1.016     Glucose, UA Negative     Ketones, UA 15 mg/dL (1+)     Bilirubin, UA Negative     Blood, UA Large (3+)     Protein,  mg/dL (2+)     Leuk Esterase, UA Large (3+)     Nitrite, UA Positive     Urobilinogen, UA 1.0 E.U./dL    Narrative:      In absence of clinical symptoms, the presence of pyuria, bacteria, and/or nitrites on the urinalysis result does not correlate with infection.    Urinalysis, Microscopic Only - Straight Cath [[id number]]  (Abnormal) Collected: 11/12/24 1436    Specimen: Urine from Straight Cath Updated: 11/12/24 1456     RBC, UA 21-50 /HPF      WBC, UA Too Numerous to Count /HPF      Bacteria, UA 4+ /HPF      Squamous Epithelial Cells, UA 0-2 /HPF      Yeast, UA Small/1+ Yeast /HPF      Hyaline Casts, UA None Seen /LPF      Methodology Manual Light Microscopy    Urine Culture - Urine, Straight Cath [[id number]] Collected: 11/12/24 1436    Specimen:  Urine from Straight Cath Updated: 11/12/24 1456    Magnesium [[id number]]  (Normal) Collected: 11/12/24 1258    Specimen: Blood Updated: 11/12/24 1449     Magnesium 2.1 mg/dL     Blood Culture - Blood, Arm, Left [[id number]] Collected: 11/12/24 1359    Specimen: Blood from Arm, Left Updated: 11/12/24 1408    Comprehensive Metabolic Panel [[id number]]  (Abnormal) Collected: 11/12/24 1258    Specimen: Blood Updated: 11/12/24 1334     Glucose 187 mg/dL      BUN 42 mg/dL      Creatinine 2.46 mg/dL      Sodium 142 mmol/L      Potassium 3.3 mmol/L      Comment: Slight hemolysis detected by analyzer. Result may be falsely elevated.        Chloride 109 mmol/L      CO2 17.0 mmol/L      Calcium 10.7 mg/dL      Total Protein 7.3 g/dL      Albumin 3.3 g/dL      ALT (SGPT) 6 U/L      AST (SGOT) 9 U/L      Alkaline Phosphatase 93 U/L      Total Bilirubin 0.3 mg/dL      Globulin 4.0 gm/dL      A/G Ratio 0.8 g/dL      BUN/Creatinine Ratio 17.1     Anion Gap 16.0 mmol/L      eGFR 19.1 mL/min/1.73     Narrative:      GFR Normal >60  Chronic Kidney Disease <60  Kidney Failure <15    The GFR formula is only valid for adults with stable renal function between ages 18 and 70.    Lactic Acid, Plasma [[id number]]  (Normal) Collected: 11/12/24 1258    Specimen: Blood Updated: 11/12/24 1330     Lactate 1.8 mmol/L     Lipase [[id number]]  (Abnormal) Collected: 11/12/24 1258    Specimen: Blood Updated: 11/12/24 1329     Lipase 10 U/L     Bartlett Draw [[id number]] Collected: 11/12/24 1258    Specimen: Blood Updated: 11/12/24 1315    Narrative:      The following orders were created for panel order Bartlett Draw.  Procedure                               Abnormality         Status                     ---------                               -----------         ------                     Green Top (Gel)[[id number]]                                  Final result               Lavender Top[[id number]]                                     Final result                Red Top[[id number]]                                          Final result               Light Blue Top[[id number]]                                   Final result                 Please view results for these tests on the individual orders.    Green Top (Gel) [[id number]] Collected: 11/12/24 1258    Specimen: Blood Updated: 11/12/24 1315     Extra Tube Hold for add-ons.     Comment: Auto resulted.       Lavender Top [[id number]] Collected: 11/12/24 1258    Specimen: Blood Updated: 11/12/24 1315     Extra Tube hold for add-on     Comment: Auto resulted       Red Top [[id number]] Collected: 11/12/24 1258    Specimen: Blood Updated: 11/12/24 1315     Extra Tube Hold for add-ons.     Comment: Auto resulted.       Light Blue Top [[id number]] Collected: 11/12/24 1258    Specimen: Blood Updated: 11/12/24 1315     Extra Tube Hold for add-ons.     Comment: Auto resulted       CBC & Differential [[id number]]  (Abnormal) Collected: 11/12/24 1258    Specimen: Blood Updated: 11/12/24 1312    Narrative:      The following orders were created for panel order CBC & Differential.  Procedure                               Abnormality         Status                     ---------                               -----------         ------                     CBC Auto Differential[[id number]]        Abnormal            Final result                 Please view results for these tests on the individual orders.    CBC Auto Differential [[id number]]  (Abnormal) Collected: 11/12/24 1258    Specimen: Blood Updated: 11/12/24 1312     WBC 12.11 10*3/mm3      RBC 3.01 10*6/mm3      Hemoglobin 8.8 g/dL      Hematocrit 27.7 %      MCV 92.0 fL      MCH 29.2 pg      MCHC 31.8 g/dL      RDW 16.0 %      RDW-SD 53.1 fl      MPV 10.5 fL      Platelets 175 10*3/mm3      Neutrophil % 89.7 %      Lymphocyte % 5.0 %      Monocyte % 4.5 %      Eosinophil % 0.0 %      Basophil % 0.1 %      Immature Grans % 0.7 %      Neutrophils, Absolute 10.85 10*3/mm3       Lymphocytes, Absolute 0.61 10*3/mm3      Monocytes, Absolute 0.55 10*3/mm3      Eosinophils, Absolute 0.00 10*3/mm3      Basophils, Absolute 0.01 10*3/mm3      Immature Grans, Absolute 0.09 10*3/mm3      nRBC 0.0 /100 WBC           Imaging Results (Last 24 Hours)       Procedure Component Value Units Date/Time    CT Abdomen Pelvis Without Contrast [[id number]] Collected: 11/12/24 1530     Updated: 11/12/24 1539    Narrative:      EXAM: CT ABDOMEN PELVIS WO CONTRAST-      DATE: 11/12/2024 2:12 PM     HISTORY: Abdominal pain       COMPARISON: 10/10/2024.     DOSE LENGTH PRODUCT: 449.44 mGy.cm Automatic exposure control was  utilized to make radiation dose as low as reasonably achievable.     TECHNIQUE: Noncontrast axial images of the abdomen and pelvis obtained  with multiplanar reformats.     FINDINGS:  VISUALIZED CHEST: There is mitral valve calcification. No pericardial or  pleural effusion is identified. Lung bases are grossly clear.     LIVER/BILE DUCTS: There are clips from cholecystectomy. Unenhanced  hepatic parenchyma is unremarkable. Bile ducts are unremarkable.     KIDNEYS/URETERS: Left hydronephrosis has resolved. Right hydronephrosis  has improved and is now mild. No renal or ureteral calculi are  identified.     ADRENAL: Unremarkable.        SPLEEN: Unremarkable.     PANCREAS: Unremarkable.     MESENTERY: No mesenteric or retroperitoneal lymphadenopathy is seen. No  free fluid or inflammatory changes are identified.     VASCULATURE: There is calcification of the abdominal aorta without  aneurysm.     GI TRACT: There is no bowel obstruction. There is mild stool in the  colon but this has improved.     PELVIS: There is moderate stool in the rectum which is unchanged.  Patient is status post hysterectomy. Urinary bladder is unremarkable.  Pelvic portion of the GI tract including appendix is otherwise  unremarkable. No pelvic lymphadenopathy or free fluid is seen.     SOFT TISSUES: A small umbilical hernia  contains fat.     BONES: No acute or aggressive bony lesion.           Impression:      1. Resolved left hydronephrosis and improved right hydronephrosis.  2. Resolved small bilateral pleural effusions and atelectasis.  3. Improved stool in the colon with stable moderate stool at the rectum.        This report was signed and finalized on 11/12/2024 3:36 PM by Sung Chavez.             I have personally reviewed and interpreted the radiology studies and ECG obtained at time of admission.     Assessment / Plan   Assessment:   Active Hospital Problems    Diagnosis     **Acute renal failure superimposed on stage 3 chronic kidney disease     Acute cystitis     Hypercalcemia     Dementia     Chronic constipation     Anemia     Essential hypertension        Treatment Plan  The patient will be admitted to my service here at [facility].     #1 acute on chronic renal failure -patient was discharged from the hospital with a creatinine of 1.2 and a GFR of 44.  Presents back in with a creatinine of 2.46 and GFR of 19.  Likely in the setting of volume depletion and infection.  CT of the abdomen pelvis shows resolved left hydronephrosis and proved right hydronephrosis from prior.  That being said Smith catheter is now out and will likely need to be replaced.  IV fluid resuscitation and treat infection.  Urology consult.  Repeat labs in the morning.  Avoid nephrotoxic meds.    #2 urinary retention -patient previously with urinary retention and Smith catheter in place from last hospital stay.  Was supposed to follow-up with urology.  Does not appear that office visit ever took place.  Again as noted above imaging shows resolved left-sided hydro but some persistent improving right-sided hydro.  Patient traumatically pulled out her Smith catheter with bulb intact at home.  Has dried blood in her groin.  Will ask urology to evaluate    #3 acute cystitis -last hospital stay urine grew Morganella but blood grew E. coli.   Both were sensitive to ceftriaxone.  Will start ceftriaxone follow-up blood and urine cultures.  Patient does not appear septic.  Afebrile.  Normal lactic acid.  Not tacky or hypotensive.    #4 hypercalcemia -10.7.  Likely from volume depletion.  Resuscitated with IV normal saline and recheck in the morning.  She is on ergocalciferol at home and this would need to be discontinued if calcium does not improve with fluids.    #5 chronic constipation -CT with only moderate stool in the rectum.  Continue bowel regimen.    #6 abdominal pain -pelvic region.  Suspect from UTI and Smith catheter removal.  CT abdomen pelvis really did not have any significant acute issues.    #7 essential hypertension -pressures 150s on arrival.  Will start back dose of Coreg.  Will monitor blood pressures with infection and resume other meds as appropriate.    #8 dementia -report of care.  Resume Zoloft and Aricept.    #9 hyperlipidemia -statin when able to take p.o.    Medical Decision Making  Number and Complexity of problems: 3 acute, 2 acute on chronic, multiple chronic  Differential Diagnosis: as above    Conditions and Status        New to me, interactive, no obvious focal deficits, seems dry but not toxic/septic.      [facility] Data  External documents reviewed: none  Cardiac tracing (EKG, telemetry) interpretation: none done on arrival  Radiology interpretation: reports reviewed  Labs reviewed: as above  Any tests that were considered but not ordered: none     Decision rules/scores evaluated (example [id number], Wells, etc): none     Discussed with: patient, nursing     Care Planning  Shared decision making: no family at bedside, will try to reach out to spouse  Code status and discussions: Full code - pt pt, does have some dementia but that was also listed code status last hospital stay. Will confirm with family when able    Disposition  Social Determinants of Health that impact treatment or disposition: none  Estimated length of stay  is 2+ days.     I confirmed that the patient's advanced care plan is present, code status is documented, and a surrogate decision maker is listed in the patient's medical record.     The patient's surrogate decision maker is her     The patient was seen and examined by me on 11/12/24 at 4:45 PM.    Electronically signed by Eleazar Chavarria DO, 11/12/24, 17:41 CST.                Electronically signed by Eleazar Chavarria DO at 11/12/24 1741          Physician Progress Notes (last 24 hours)        Cuba Espitia MD at 11/15/24 1021              [facility] Medicine Services  INPATIENT PROGRESS NOTE    Patient Name: Jennifer Gomes  Date of Admission: 11/12/2024  Today's Date: 11/15/24  Length of Stay: 3  Primary Care Physician: Provider, No Known    Subjective   Chief Complaint: Abdominal pain  Abdominal Pain    Weakness - Generalized  Associated symptoms include abdominal pain.      84-year-old female with history of hypertension, chronic kidney disease stage III, chronic pain, dementia, hypothyroidism, GERD, hyperlipidemia, discharged from the hospital October 2024, at that time admitted for acute renal insufficiency associated to bilateral hydronephrosis.  Patient had a Smith catheter placed.    Patient was admitted 11/12/2024 after she accidentally pulled out Smith catheter.  Found to have acute renal insufficiency, urinary tract infection; I started taking care of this patient 11/13/2024    No family member present.  Had episode of atrial fibrillation 11/13/24, required Cardizem drip and transferred to 4 floor, converted to sinus rhythm at the end of the day.    She was started on Lovenox full anticoagulation.    Smith catheter in place  Urology evaluated patient recommending to repeat imaging studies renal ultrasound 11/15.   Per nurse, patient removed peripheral line.  A transfusion of 1 unit packed red blood cells has been ordered and not able to administer so far due  to no access.        Review of Systems   Gastrointestinal:  Positive for abdominal pain.      All pertinent negatives and positives are as above. All other systems have been reviewed and are negative unless otherwise stated.     Objective    Temp:  [98.5 °F (36.9 °C)-98.8 °F (37.1 °C)] 98.5 °F (36.9 °C)  Heart Rate:  [74-81] 74  Resp:  [18] 18  BP: (119-147)/(42-62) 146/54  Physical Exam  Constitutional:       Appearance: Alert, oriented to place time and person.  No respiratory distress.  HENT:      Head: Normocephalic and atraumatic.      Nose: Nose normal.      Mouth/Throat:      Mouth: Mucous membranes are moist.   Eyes:      Extraocular Movements: Extraocular movements intact.      Conjunctiva/sclera: Conjunctivae normal.   Cardiovascular:      Rate and Rhythm: Normal rate and regular rhythm.      Pulses: Normal pulses.   Pulmonary:      Effort: No respiratory distress.      Breath sounds: Normal breath sounds. No wheezing, rhonchi or rales.   Abdominal:      General: Abdomen is flat. Bowel sounds are normal.      Palpations: Abdomen is soft.      Tenderness: There is no guarding or rebound.   Genitourinary.  Smith catheter in place  Extremities:  No lower extremity edema.  Skin:     Capillary Refill: Capillary refill takes less than 2 seconds.      Coloration: Skin is not jaundiced.      Findings: No rash.   Neurological:      General: No focal deficit present.      Mental Status: Patient is alert, disoriented to situation.     Sensory: No sensory deficit.      Motor: No weakness.      Coordination: Coordination normal. l.           Results Review:  I have reviewed the labs, radiology results, and diagnostic studies.    Laboratory Data:   Results from last 7 days   Lab Units 11/15/24  0313 11/14/24  1614 11/13/24  0441 11/12/24  1258   WBC 10*3/mm3  --  6.45 9.96 12.11*   HEMOGLOBIN g/dL 6.6* 7.0* 7.7* 8.8*   HEMATOCRIT % 20.8* 21.9* 23.2* 27.7*   PLATELETS 10*3/mm3  --  139* 140 175        Results from last 7  "days   Lab Units 11/15/24  0313 11/14/24  1614 11/14/24  0501 11/13/24  0441 11/12/24  1258   SODIUM mmol/L 141 141 140   < > 142   POTASSIUM mmol/L 4.7 2.8*  3.0* 2.8*   < > 3.3*   CHLORIDE mmol/L 117* 114* 113*   < > 109*   CO2 mmol/L 16.0* 19.0* 17.0*   < > 17.0*   BUN mg/dL 27* 27* 29*   < > 42*   CREATININE mg/dL 1.37* 1.55* 1.59*   < > 2.46*   CALCIUM mg/dL 9.2 9.5 9.4   < > 10.7*   BILIRUBIN mg/dL  --   --   --   --  0.3   ALK PHOS U/L  --   --   --   --  93   ALT (SGPT) U/L  --   --   --   --  6   AST (SGOT) U/L  --   --   --   --  9   GLUCOSE mg/dL 117* 128* 115*   < > 187*    < > = values in this interval not displayed.       Culture Data:   No results found for: \"BLOODCX\", \"URINECX\", \"WOUNDCX\", \"MRSACX\", \"RESPCX\", \"STOOLCX\"    Radiology Data:   Imaging Results (Last 24 Hours)       ** No results found for the last 24 hours. **            I have reviewed the patient's current medications.     Assessment/Plan   Assessment  Active Hospital Problems    Diagnosis     **Acute renal failure superimposed on stage 3 chronic kidney disease     Paroxysmal atrial fibrillation     Acute cystitis     Hypercalcemia     Dementia     Chronic constipation     Anemia     Essential hypertension        Acute kidney injury/renal insufficiency on chronic kidney disease stage IIIb resolved  Paroxysmal atrial fibrillation  Right hydronephrosis  Acute on chronic anemia  History of bilateral hydronephrosis  Urinary tract infection, E. coli  Constipation  Hypokalemia  Hypophosphatemia  Chronic normocytic anemia        Potassium has improved to 4.7.  Phosphorus 2.1.  Creatinine 1.37, down from 2.46  Sodium 141    Hemoglobin 6.6; there is no report of hematuria, no melena, no hematemesis, no rectal bleeding.    Urine culture with more than 100,000 Ecoli pansensitive  Blood cultures negative so far    B12 523    Treatment Plan  Ceftriaxone day #4; will change antibiotics to cefdinir.  Complete 7 days of treatment    Transfuse 1 unit " packed red blood cells.  Following balloon hematocrit.    Renal ultrasound today.  Continue Smith catheter.    Follow renal function and electrolytes.  Replace potassium and phosphorus per protocol.    Due to acute on chronic anemia.  Anticoagulation will be placed on hold.  Patient may not be a candidate for long-term anticoagulation given anemia.    Patient will benefit from skilled nursing facility.   to reassess and decide on placement.  Patient may be discharged after transfusion and ultrasound reviewed.      Medical Decision Making  Number and Complexity of problems: More than 10, moderate to high complexity  Differential Diagnosis: See above    Conditions and Status        Condition is unchanged.     [facility] Data  External documents reviewed: None  Cardiac tracing (EKG, telemetry) interpretation: Atrial fibrillation  Radiology interpretation: Radiology reports reviewed  Labs reviewed: Yes  Any tests that were considered but not ordered: No     Decision rules/scores evaluated (example [id number], Wells, etc): See chart     Discussed with: Patient and nurse     Care Planning  Shared decision making: With patient  Code status and discussions: Full code    Disposition  Social Determinants of Health that impact treatment or disposition: None  I expect the patient to be discharged to skilled facility in 1 days.         Electronically signed by Cuba Espitia MD, 11/15/24, 10:21 CST.     Electronically signed by Cuba Espitia MD at 11/15/24 1026       MuhammadEleazar MD at 11/15/24 0658           [facility]     Progress Note    Patient Name: Jennifer Gomes  : 1940  MRN: [id number]  Primary Care Physician:  Provider, No Known  Date of admission: 2024    Subjective   Subjective     Chief Complaint: Retention bilateral hydronephrosis    History of Present Illness  Patient Reports no issues with Smith catheter, retinae improving with Smith catheter in place    Review of  Systems   Constitutional:  Negative for chills and fever.   Gastrointestinal:  Negative for abdominal pain, anal bleeding and blood in stool.   Genitourinary:  Negative for dysuria and hematuria.       Objective   Objective     Vitals:   Temp:  [98.5 °F (36.9 °C)-98.8 °F (37.1 °C)] 98.8 °F (37.1 °C)  Heart Rate:  [74-82] 75  Resp:  [18] 18  BP: (119-147)/(42-62) 145/52    Physical Exam  Vitals reviewed.   Constitutional:       General: She is not in acute distress.     Appearance: She is well-developed. She is not diaphoretic.   Pulmonary:      Effort: Pulmonary effort is normal.   Abdominal:      General: There is no distension.      Palpations: Abdomen is soft. There is no mass.      Tenderness: There is no abdominal tenderness. There is no guarding or rebound.      Hernia: No hernia is present.   Genitourinary:     Comments: Smith in place  Skin:     General: Skin is warm and dry.   Neurological:      Mental Status: She is alert and oriented to person, place, and time.          Result Review    Result Review:  I have personally reviewed the results from the time of this admission to 11/15/2024 06:58 CST and agree with these findings:  [x]  Laboratory list / accordion  []  Microbiology  []  Radiology  []  EKG/Telemetry   []  Cardiology/Vascular   []  Pathology  []  Old records  []  Other:  Most notable findings include: Creatinine 1.37 previous 1.55      Assessment & Plan   Assessment / Plan     Brief Patient Summary:  Jennifer Gomes is a 84 y.o. female who urinary retention bilateral hydronephrosis    Active Hospital Problems:  Active Hospital Problems    Diagnosis     **Acute renal failure superimposed on stage 3 chronic kidney disease     Paroxysmal atrial fibrillation     Acute cystitis     Hypercalcemia     Dementia     Chronic constipation     Anemia     Essential hypertension      Plan:     Renal ultrasound this morning will follow-up results of this.  Hopefully this shows resolution of right-sided  hydronephrosis.  She does need to have long-term management of her bladder.  This needs to be done on an outpatient basis.  I expressed with her the importance of follow-up.  Discussed suprapubic tube as well.  I do not think she would be able to perform clean intermittent catheterization and discussed that with her today.  VTE Prophylaxis:  Pharmacologic & mechanical VTE prophylaxis orders are present.        CODE STATUS:    Level Of Support Discussed With: Patient  Code Status (Patient has no pulse and is not breathing): CPR (Attempt to Resuscitate)  Medical Interventions (Patient has pulse or is breathing): Full Support    Disposition:  I expect patient to be discharged primary team.    Eleazar Muhammad MD               Electronically signed by Eleazar Muhammad MD at 11/15/24 0803          Physical Therapy Notes (last 48 hours)        Sunny Esquivel, PT DPT at 11/14/24 1608  Version 1 of 1         Goal Outcome Evaluation:  Plan of Care Reviewed With: patient           Outcome Evaluation: PT eval completed. Pt asleep in L sidelying, near foot of bed, and agreeable to session with max encouragement. Pt did complete all orienteation questions correctly x4 but remained conversationally confused throughout session. Pt required Min/Mod A to bring self to EOB. In sitting pt demo roughly 50% of B hip flexion and knee ext.No formal MMT performed due to conversation confused and lack of AROM. Pt demo decreased strength in B LE as seen with difficulty reaching full stand to reach terminal knee ext. Pt required Mod A to attempt sit<>stand but was unable to complete full stand and remained with knees flexed. Pt unable to tolerate side steps EOB and was returned to supine and repositioned higher in bed with Max/Dep A, bed alarm realarmed. Pt will benefit from skilled PT services to decrease fall risk, improve t/f and return to PLOF. Recommend SNF when medically stable but will continue to follow and progress as  tolerated.    Anticipated Discharge Disposition (PT): skilled nursing facility                          Electronically signed by Sunny Esquivel, PT DPT at 24       Sunny Esquivel, PT DPT at 24  Version 1 of 1         Patient Name: Jennifer Gomes  : 1940    MRN: [id number]                              Today's Date: 2024       Admit Date: 2024    Visit Dx:     ICD-10-CM ICD-9-CM   1. TOMÁS (acute kidney injury)  N17.9 584.9   2. Acute cystitis without hematuria  N30.00 595.0   3. Impaired mobility [Z74.09]  Z74.09 799.89     Patient Active Problem List   Diagnosis    Gastroesophageal reflux disease    Spinal stenosis, lumbar region, without neurogenic claudication    Erosion of vaginal mesh    Adenocarcinoma of endometrium    S/P hysterectomy with oophorectomy    Encounter for follow-up surveillance of endometrial cancer    History of radiation therapy    Non-traumatic rhabdomyolysis    Acute renal failure superimposed on stage 3 chronic kidney disease    Medical non-compliance, does not take narcotics as prescribed    Chronic constipation    Chronic pain syndrome    Chronic prescription opiate use    Anemia    Hypothyroidism (acquired)    Essential hypertension    Venous insufficiency of both lower extremities    Chronic intractable headache    RAFAL (obstructive sleep apnea)    Acute encephalopathy due to UTI    Toxic metabolic encephalopathy    Polypharmacy    Frequent falls    Grade III internal hemorrhoids    External hemorrhoids    Colitis    Moderate malnutrition    Transaminitis    Acute UTI (urinary tract infection)    Polypharmacy    UTI (urinary tract infection)    Dementia    Hypokalemia    Sepsis due to urinary tract infection    Cardiopulmonary arrest    E coli bacteremia    Anemia requiring transfusions    Bilateral hydronephrosis    Acute urinary retention    TOMÁS (acute kidney injury)    Acute cystitis    Hypercalcemia    Paroxysmal atrial fibrillation     Past  Medical History:   Diagnosis Date    Anxiety     Arthritis     Bronchitis     Cancer     uterine    Chronic pain     Depression     Disease of thyroid gland     Fibromyalgia     GERD (gastroesophageal reflux disease)     Headache     History of transfusion     AS     Hyperlipidemia     Hypertension     Incontinence     Insomnia     Leg pain     Lumbar stenosis     Migraines     Peptic ulcer     Restless legs     Sleep apnea     NO C-PAP    UTI (urinary tract infection)     Vaginal bleeding      Past Surgical History:   Procedure Laterality Date    BLADDER REPAIR      MESH HAD TO BE REMOVED IN 2013    BREAST BIOPSY Right 2017    benign    BREAST CYST EXCISION Left 1982    CARDIAC CATHETERIZATION      CARPAL TUNNEL RELEASE      CATARACT EXTRACTION W/ INTRAOCULAR LENS  IMPLANT, BILATERAL      CHOLECYSTECTOMY WITH INTRAOPERATIVE CHOLANGIOGRAM N/A 2023    Procedure: CHOLECYSTECTOMY LAPAROSCOPIC INTRAOPERATIVE CHOLANGIOGRAM;  Surgeon: Gerardo Arciniega MD;  Location: [facility] OR;  Service: General;  Laterality: N/A;    COLONOSCOPY      COLONOSCOPY N/A 10/01/2021    Procedure: COLONOSCOPY WITH ANESTHESIA;  Surgeon: Tom Velasco DO;  Location: [facility] ENDOSCOPY;  Service: Gastroenterology;  Laterality: N/A;  pre: change in bowel habits  post: diverticulosis. hemorrhoids.   Olivia Mora, APRN        CYSTECTOMY      D & C HYSTEROSCOPY N/A 2017    Procedure: DILATATION AND CURETTAGE HYSTEROSCOPY;  Surgeon: Shasta Madrigal MD;  Location: [facility] OR;  Service:     DILATION AND CURETTAGE, DIAGNOSTIC / THERAPEUTIC  2008    ENDOSCOPY  2010    Short segment of Arriola's,Moderate chroninc esophagogastritis and negative H.pylori    ENDOSCOPY N/A 2017    Procedure: ESOPHAGOGASTRODUODENOSCOPY WITH ANESTHESIA;  Surgeon: Tom Velasco DO;  Location: [facility] ENDOSCOPY;  Service:     EYE SURGERY      RETINA    HEMORRHOIDECTOMY SIGMOIDOSCOPY N/A 3/21/2023    Procedure: HEMORRHOIDECTOMY WITH EXAM  UNDER ANESTHESIA;  Surgeon: Holly Chavez MD;  Location:  [facility] OR;  Service: General;  Laterality: N/A;    HYSTERECTOMY  12/20/2017    ORIF TIBIA/FIBULA FRACTURES Left 2000    TRANSVAGINAL TAPING SUSPENSION N/A 11/06/2017    Procedure: VAGINAL MESH REVISION;  Surgeon: Shasta Madrigal MD;  Location:  [facility] OR;  Service:     VAGINAL MESH REVISION  2013      General Information       Row Name 11/14/24 1501          Physical Therapy Time and Intention    Document Type evaluation  -     Mode of Treatment physical therapy  -       Row Name 11/14/24 1501          General Information    Patient Profile Reviewed yes  -AJ     Prior Level of Function --  no family at bedside, remains cnfused  -     Existing Precautions/Restrictions fall;other (see comments)  mcclain  -       Row Name 11/14/24 1501          Living Environment    People in Home spouse;child(sebastian), adult  -       Row Name 11/14/24 1501          Home Main Entrance    Number of Stairs, Main Entrance four;other (see comments)  per previous chart review, unable of complete accuracy today  -       Row Name 11/14/24 1501          Cognition    Orientation Status (Cognition) oriented x 4;other (see comments)  was oriented x4 but conversationally confused thorughout session  -       Row Name 11/14/24 1501          Safety Issues/Impairments Affecting Functional Mobility    Safety Issues Affecting Function (Mobility) safety precaution awareness;safety precautions follow-through/compliance;insight into deficits/self-awareness  -     Impairments Affecting Function (Mobility) balance;range of motion (ROM);endurance/activity tolerance;strength;pain  -               User Key  (r) = Recorded By, (t) = Taken By, (c) = Cosigned By      Initials Name Provider Type    AJ Sunny Esquivel, HUNTER DPT Physical Therapist                   Mobility       Row Name 11/14/24 1501          Bed Mobility    Bed Mobility rolling right;supine-sit;scooting/bridging  -     Rolling  "Right Desha (Bed Mobility) minimum assist (75% patient effort)  -     Scooting/Bridging Desha (Bed Mobility) maximum assist (25% patient effort);dependent (less than 25% patient effort)  -KAVITA     Supine-Sit Desha (Bed Mobility) moderate assist (50% patient effort)  -     Assistive Device (Bed Mobility) repositioning sheet  -       Row Name 11/14/24 1501          Sit-Stand Transfer    Sit-Stand Desha (Transfers) moderate assist (50% patient effort)  -     Assistive Device (Sit-Stand Transfers) --  HHA  -     Comment, (Sit-Stand Transfer) pt stood to clear bed and remained with knees flexed, \"i cannot do this anymore\"  -       Row Name 11/14/24 1501          Gait/Stairs (Locomotion)    Desha Level (Gait) other (see comments)  attempted side steps but unable to perform  -     Patient was able to Ambulate no, other medical factors prevent ambulation  -     Reason Patient was unable to Ambulate Excessive Weakness  -               User Key  (r) = Recorded By, (t) = Taken By, (c) = Cosigned By      Initials Name Provider Type    Sunny Tsang, PT DPT Physical Therapist                   Obj/Interventions       Row Name 11/14/24 1501          Range of Motion Comprehensive    General Range of Motion lower extremity range of motion deficits identified  -     Comment, General Range of Motion pt demo roughly 50% of B hip flexion and knee ext  -       Row Name 11/14/24 1501          Strength Comprehensive (MMT)    General Manual Muscle Testing (MMT) Assessment lower extremity strength deficits identified  -     Comment, General Manual Muscle Testing (MMT) Assessment No formal MMT performed due to conversation confused and lack of AROM. Pt demo decreased strength in B LE as seen with difficulty reaching full stand to reach terminal knee ext  -       Row Name 11/14/24 1501          Balance    Balance Assessment sitting static balance;sitting dynamic balance;standing " static balance;standing dynamic balance  -AJ     Static Sitting Balance contact guard  -AJ     Dynamic Sitting Balance contact guard  -AJ     Position, Sitting Balance supported;sitting edge of bed  -AJ     Static Standing Balance minimal assist;1-person assist  -AJ     Dynamic Standing Balance moderate assist;1-person assist  -AJ     Position/Device Used, Standing Balance supported;other (see comments)  A  -AJ     Balance Interventions sitting;standing;sit to stand;supported;dynamic;static  -AJ               User Key  (r) = Recorded By, (t) = Taken By, (c) = Cosigned By      Initials Name Provider Type    Sunny Tsang, PT DPT Physical Therapist                   Goals/Plan       Row Name 11/14/24 1501          Bed Mobility Goal 1 (PT)    Activity/Assistive Device (Bed Mobility Goal 1, PT) rolling to left;rolling to right;sit to supine;supine to sit  -AJ     Rogers Level/Cues Needed (Bed Mobility Goal 1, PT) standby assist;verbal cues required  -AJ     Time Frame (Bed Mobility Goal 1, PT) long term goal (LTG);10 days  -AJ     Progress/Outcomes (Bed Mobility Goal 1, PT) new goal  -       Row Name 11/14/24 1501          Transfer Goal 1 (PT)    Activity/Assistive Device (Transfer Goal 1, PT) sit-to-stand/stand-to-sit;bed-to-chair/chair-to-bed;toilet;wheelchair transfer  -AJ     Rogers Level/Cues Needed (Transfer Goal 1, PT) contact guard required  -AJ     Time Frame (Transfer Goal 1, PT) long term goal (LTG);10 days  -AJ     Progress/Outcome (Transfer Goal 1, PT) new goal  -       Row Name 11/14/24 1501          Gait Training Goal 1 (PT)    Activity/Assistive Device (Gait Training Goal 1, PT) gait (walking locomotion);decrease asymmetrical patterns;decrease fall risk;diminish gait deviation;forward stepping;improve balance and speed;increase endurance/gait distance;increase energy conservation;assistive device use;walker, rolling  -     Rogers Level (Gait Training Goal 1, PT) minimum  assist (75% or more patient effort)  -AJ     Distance (Gait Training Goal 1, PT) 25'  -AJ     Time Frame (Gait Training Goal 1, PT) long term goal (LTG);10 days  -AJ     Progress/Outcome (Gait Training Goal 1, PT) new goal  -       Row Name 11/14/24 1501          ROM Goal 1 (PT)    ROM Goal 1 (PT) Pt will demo >75% AROM in B LE as needed for safety with t/f and decrease fall risk  -AJ     Time Frame (ROM Goal 1, PT) long-term goal (LTG);10 days  -AJ     Progress/Outcome (ROM Goal 1, PT) new goal  -       Row Name 11/14/24 1501          Therapy Assessment/Plan (PT)    Planned Therapy Interventions (PT) balance training;bed mobility training;gait training;home exercise program;postural re-education;patient/family education;transfer training;stretching;strengthening;ROM (range of motion)  -AJ               User Key  (r) = Recorded By, (t) = Taken By, (c) = Cosigned By      Initials Name Provider Type    AJ Sunny Esquivel, PT DPT Physical Therapist                   Clinical Impression       Row Name 11/14/24 1501          Pain    Pain Location back;buttock;abdomen;groin  -AJ     Pain Side/Orientation generalized  -AJ     Pain Management Interventions nursing notified;exercise or physical activity utilized  -AJ     Response to Pain Interventions activity participation with increased pain  -AJ     Additional Documentation Pain Scale: FACES Pre/Post-Treatment (Group)  -       Row Name 11/14/24 1501          Pain Scale: FACES Pre/Post-Treatment    Pain: FACES Scale, Pretreatment 2-->hurts little bit  -AJ     Posttreatment Pain Rating 2-->hurts little bit  -       Row Name 11/14/24 1501          Plan of Care Review    Plan of Care Reviewed With patient  -     Outcome Evaluation PT eval completed. Pt asleep in L sidelying, near foot of bed, and agreeable to session with max encouragement. Pt did complete all orienteation questions correctly x4 but remained conversationally confused throughout session. Pt required  Min/Mod A to bring self to EOB. In sitting pt demo roughly 50% of B hip flexion and knee ext.No formal MMT performed due to conversation confused and lack of AROM. Pt demo decreased strength in B LE as seen with difficulty reaching full stand to reach terminal knee ext. Pt required Mod A to attempt sit<>stand but was unable to complete full stand and remained with knees flexed. Pt unable to tolerate side steps EOB and was returned to supine and repositioned higher in bed with Max/Dep A, bed alarm realarmed. Pt will benefit from skilled PT services to decrease fall risk, improve t/f and return to PLOF. Recommend SNF when medically stable but will continue to follow and progress as tolerated.  -       Row Name 11/14/24 1501          Therapy Assessment/Plan (PT)    Patient/Family Therapy Goals Statement (PT) none stated  -     Rehab Potential (PT) fair  -     Criteria for Skilled Interventions Met (PT) yes;skilled treatment is necessary;meets criteria  -     Therapy Frequency (PT) 2 times/day  -     Predicted Duration of Therapy Intervention (PT) until d/c  -       Row Name 11/14/24 1501          Vital Signs    Pre Patient Position Side Lying  -AJ     Intra Patient Position Standing  -AJ     Post Patient Position Supine  -       Row Name 11/14/24 1501          Positioning and Restraints    Pre-Treatment Position in bed  -     Post Treatment Position bed  -AJ     In Bed notified nsg;fowlers;call light within reach;encouraged to call for assist;exit alarm on;side rails up x2  -               User Key  (r) = Recorded By, (t) = Taken By, (c) = Cosigned By      Initials Name Provider Type    Sunny Tsang, PT DPT Physical Therapist                   Outcome Measures       Row Name 11/14/24 1501 11/14/24 0800       How much help from another person do you currently need...    Turning from your back to your side while in flat bed without using bedrails? 2  -AJ 2  -EF    Moving from lying on back to  sitting on the side of a flat bed without bedrails? 2  -AJ 2  -EF    Moving to and from a bed to a chair (including a wheelchair)? 1  -AJ 2  -EF    Standing up from a chair using your arms (e.g., wheelchair, bedside chair)? 2  -AJ 2  -EF    Climbing 3-5 steps with a railing? 1  -AJ 1  -EF    To walk in hospital room? 2  -AJ 1  -EF    AM-PAC 6 Clicks Score (PT) 10  - 10  -EF    Highest Level of Mobility Goal 4 --> Transfer to chair/commode  -AJ 4 --> Transfer to chair/commode  -EF      Row Name 11/14/24 1501          Functional Assessment    Outcome Measure Options AM-PAC 6 Clicks Basic Mobility (PT)  -               User Key  (r) = Recorded By, (t) = Taken By, (c) = Cosigned By      Initials Name Provider Type     Sunny Esquivel, PT DPT Physical Therapist    Deion Martinez, RN Registered Nurse                                 Physical Therapy Education       Title: PT OT SLP Therapies (In Progress)       Topic: Physical Therapy (In Progress)       Point: Mobility training (In Progress)       Learning Progress Summary            Patient Acceptance, E, NR by  at 11/14/2024 1607    Comment: role of PT, fall risk, calling for assist                      Point: Home exercise program (In Progress)       Learning Progress Summary            Patient Acceptance, E, NR by  at 11/14/2024 1607    Comment: role of PT, fall risk, calling for assist                      Point: Body mechanics (In Progress)       Learning Progress Summary            Patient Acceptance, E, NR by  at 11/14/2024 1607    Comment: role of PT, fall risk, calling for assist                      Point: Precautions (In Progress)       Learning Progress Summary            Patient Acceptance, E, NR by  at 11/14/2024 1607    Comment: role of PT, fall risk, calling for assist                                      User Key       Initials Effective Dates Name Provider Type [facility] 08/15/24 -  Sunny Esquivel, PT DPT Physical Therapist PT                   PT Recommendation and Plan  Planned Therapy Interventions (PT): balance training, bed mobility training, gait training, home exercise program, postural re-education, patient/family education, transfer training, stretching, strengthening, ROM (range of motion)  Outcome Evaluation: PT eval completed. Pt asleep in L sidelying, near foot of bed, and agreeable to session with max encouragement. Pt did complete all orienteation questions correctly x4 but remained conversationally confused throughout session. Pt required Min/Mod A to bring self to EOB. In sitting pt demo roughly 50% of B hip flexion and knee ext.No formal MMT performed due to conversation confused and lack of AROM. Pt demo decreased strength in B LE as seen with difficulty reaching full stand to reach terminal knee ext. Pt required Mod A to attempt sit<>stand but was unable to complete full stand and remained with knees flexed. Pt unable to tolerate side steps EOB and was returned to supine and repositioned higher in bed with Max/Dep A, bed alarm realarmed. Pt will benefit from skilled PT services to decrease fall risk, improve t/f and return to PLOF. Recommend SNF when medically stable but will continue to follow and progress as tolerated.     Time Calculation:         PT Charges       Row Name 11/14/24 1501             Time Calculation    Start Time 1501  -AJ      Stop Time 1525  +8 for chart review  -      Time Calculation (min) 24 min  -AJ      PT Received On 11/14/24  -AJ      PT Goal Re-Cert Due Date 11/24/24  -AJ         Untimed Charges    PT Eval/Re-eval Minutes 32  -AJ         Total Minutes    Untimed Charges Total Minutes 32  -AJ       Total Minutes 32  -AJ                User Key  (r) = Recorded By, (t) = Taken By, (c) = Cosigned By      Initials Name Provider Type    Sunny Tsang PT DPT Physical Therapist                  Therapy Charges for Today       Code Description Service Date Service Provider Modifiers Qty     [id number] HC PT EVAL MOD COMPLEXITY 2 11/14/2024 Sunny Esquivel, PT DPT GP 1            PT G-Codes  Outcome Measure Options: AM-PAC 6 Clicks Basic Mobility (PT)  AM-PAC 6 Clicks Score (PT): 10  PT Discharge Summary  Anticipated Discharge Disposition (PT): skilled nursing facility    Sunny Esquivel PT DPT  11/14/2024      Electronically signed by Sunny Esquivel PT DPT at 11/14/24 1608       Occupational Therapy Notes (last 48 hours)  Notes from 11/13/24 1428 through 11/15/24 1428   No notes exist for this encounter.

## 2024-11-15 NOTE — THERAPY EVALUATION
Patient Name: Jennifer Gomes  : 1940    MRN: [id number]                              Today's Date: 11/15/2024       Admit Date: 2024    Visit Dx:     ICD-10-CM ICD-9-CM   1. TOMÁS (acute kidney injury)  N17.9 584.9   2. Acute cystitis without hematuria  N30.00 595.0   3. Impaired mobility [Z74.09]  Z74.09 799.89     Patient Active Problem List   Diagnosis    Gastroesophageal reflux disease    Spinal stenosis, lumbar region, without neurogenic claudication    Erosion of vaginal mesh    Adenocarcinoma of endometrium    S/P hysterectomy with oophorectomy    Encounter for follow-up surveillance of endometrial cancer    History of radiation therapy    Non-traumatic rhabdomyolysis    Acute renal failure superimposed on stage 3 chronic kidney disease    Medical non-compliance, does not take narcotics as prescribed    Chronic constipation    Chronic pain syndrome    Chronic prescription opiate use    Anemia    Hypothyroidism (acquired)    Essential hypertension    Venous insufficiency of both lower extremities    Chronic intractable headache    RAFAL (obstructive sleep apnea)    Acute encephalopathy due to UTI    Toxic metabolic encephalopathy    Polypharmacy    Frequent falls    Grade III internal hemorrhoids    External hemorrhoids    Colitis    Moderate malnutrition    Transaminitis    Acute UTI (urinary tract infection)    Polypharmacy    UTI (urinary tract infection)    Dementia    Hypokalemia    Sepsis due to urinary tract infection    Cardiopulmonary arrest    E coli bacteremia    Anemia requiring transfusions    Bilateral hydronephrosis    Acute urinary retention    TOMÁS (acute kidney injury)    Acute cystitis    Hypercalcemia    Paroxysmal atrial fibrillation     Past Medical History:   Diagnosis Date    Anxiety     Arthritis     Bronchitis     Cancer     uterine    Chronic pain     Depression     Disease of thyroid gland     Fibromyalgia     GERD (gastroesophageal reflux disease)     Headache     History  of transfusion     AS     Hyperlipidemia     Hypertension     Incontinence     Insomnia     Leg pain     Lumbar stenosis     Migraines     Peptic ulcer     Restless legs     Sleep apnea     NO C-PAP    UTI (urinary tract infection)     Vaginal bleeding      Past Surgical History:   Procedure Laterality Date    BLADDER REPAIR      MESH HAD TO BE REMOVED IN 2013    BREAST BIOPSY Right 2017    benign    BREAST CYST EXCISION Left     CARDIAC CATHETERIZATION      CARPAL TUNNEL RELEASE      CATARACT EXTRACTION W/ INTRAOCULAR LENS  IMPLANT, BILATERAL      CHOLECYSTECTOMY WITH INTRAOPERATIVE CHOLANGIOGRAM N/A 2023    Procedure: CHOLECYSTECTOMY LAPAROSCOPIC INTRAOPERATIVE CHOLANGIOGRAM;  Surgeon: Gerardo Arciniega MD;  Location:  [facility] OR;  Service: General;  Laterality: N/A;    COLONOSCOPY      COLONOSCOPY N/A 10/01/2021    Procedure: COLONOSCOPY WITH ANESTHESIA;  Surgeon: Tom Velasco DO;  Location:  [facility] ENDOSCOPY;  Service: Gastroenterology;  Laterality: N/A;  pre: change in bowel habits  post: diverticulosis. hemorrhoids.   Olivia Mora APRN        CYSTECTOMY      D & C HYSTEROSCOPY N/A 2017    Procedure: DILATATION AND CURETTAGE HYSTEROSCOPY;  Surgeon: Shasta Madrigal MD;  Location: [facility] OR;  Service:     DILATION AND CURETTAGE, DIAGNOSTIC / THERAPEUTIC      ENDOSCOPY  2010    Short segment of Arriola's,Moderate chroninc esophagogastritis and negative H.pylori    ENDOSCOPY N/A 2017    Procedure: ESOPHAGOGASTRODUODENOSCOPY WITH ANESTHESIA;  Surgeon: Tom Velasco DO;  Location: [facility] ENDOSCOPY;  Service:     EYE SURGERY      RETINA    HEMORRHOIDECTOMY SIGMOIDOSCOPY N/A 3/21/2023    Procedure: HEMORRHOIDECTOMY WITH EXAM UNDER ANESTHESIA;  Surgeon: Holly Chavez MD;  Location:  [facility] OR;  Service: General;  Laterality: N/A;    HYSTERECTOMY  2017    ORIF TIBIA/FIBULA FRACTURES Left     TRANSVAGINAL TAPING SUSPENSION N/A 2017    Procedure:  "VAGINAL MESH REVISION;  Surgeon: Shasta Madrigal MD;  Location:  [facility] OR;  Service:     VAGINAL MESH REVISION  2013      General Information       Row Name 11/15/24 1442          OT Time and Intention    Document Type evaluation  -     Mode of Treatment occupational therapy  -     Patient Effort good  -       Row Name 11/15/24 1442          General Information    Patient Profile Reviewed yes  -     Prior Level of Function --  Pt reports she \"hasn't stood in months,\" but was not able to provide clear PLOF  -     Existing Precautions/Restrictions fall;other (see comments)  -       Row Name 11/15/24 1442          Living Environment    People in Home spouse;child(sebastian), adult  -       Row Name 11/15/24 1442          Home Main Entrance    Number of Stairs, Main Entrance four  -       Row Name 11/15/24 1442          Cognition    Orientation Status (Cognition) oriented x 4  -       Row Name 11/15/24 1442          Safety Issues/Impairments Affecting Functional Mobility    Safety Issues Affecting Function (Mobility) at risk behavior observed;friction/shear risk;insight into deficits/self-awareness;judgment  -     Impairments Affecting Function (Mobility) balance;range of motion (ROM);endurance/activity tolerance;strength;pain  -               User Key  (r) = Recorded By, (t) = Taken By, (c) = Cosigned By      Initials Name Provider Type     Jennifer Briceno, OTR/L Occupational Therapist                     Mobility/ADL's       Row Name 11/15/24 1442          Bed Mobility    Bed Mobility sidelying-sit;rolling left;rolling right  -     Rolling Left [place] (Bed Mobility) supervision  -     Rolling Right [place] (Bed Mobility) supervision  -     Supine-Sit [place] (Bed Mobility) minimum assist (75% patient effort)  -     Assistive Device (Bed Mobility) repositioning sheet;head of bed elevated;bed rails  -       Row Name 11/15/24 1442          Transfers    Transfers sit-stand " transfer;stand-sit transfer  -     Comment, (Transfers) Pt not able to achieve fully upright posture.  -       Row Name 11/15/24 1442          Sit-Stand Transfer    Sit-Stand [place] (Transfers) maximum assist (25% patient effort)  -       Row Name 11/15/24 1442          Activities of Daily Living    BADL Assessment/Intervention lower body dressing;toileting  -       Row Name 11/15/24 1442          Lower Body Dressing Assessment/Training    [place] Level (Lower Body Dressing) contact guard assist;socks;don  -       Row Name 11/15/24 1442          Toileting Assessment/Training    [place] Level (Toileting) maximum assist (25% patient effort);adjust/manage clothing;perform perineal hygiene;change pad/brief  -CH     Position (Toileting) supine  -               User Key  (r) = Recorded By, (t) = Taken By, (c) = Cosigned By      Initials Name Provider Type    Jennifer Villegas, OTR/L Occupational Therapist                   Obj/Interventions       Row Name 11/15/24 1442          Sensory Assessment (Somatosensory)    Sensory Assessment (Somatosensory) UE sensation intact  -       Row Name 11/15/24 1442          Vision Assessment/Intervention    Visual Impairment/Limitations WFL  -       Row Name 11/15/24 1442          Range of Motion Comprehensive    General Range of Motion bilateral upper extremity ROM WFL  -       Row Name 11/15/24 1442          Strength Comprehensive (MMT)    Comment, General Manual Muscle Testing (MMT) Assessment grossly 4-/5  -[facility] Name 11/15/24 1442          Balance    Static Sitting Balance supervision  -     Dynamic Sitting Balance contact guard;minimal assist  -     Position, Sitting Balance unsupported;sitting edge of bed  -     Balance Interventions sitting;sit to stand  -               User Key  (r) = Recorded By, (t) = Taken By, (c) = Cosigned By      Initials Name Provider Type    Jennifer Villegas, OTR/L Occupational Therapist                    Goals/Plan       Row Name 11/15/24 1442          Transfer Goal 1 (OT)    Activity/Assistive Device (Transfer Goal 1, OT) sit-to-stand/stand-to-sit;bed-to-chair/chair-to-bed;commode, 3-in-1;walker, rolling  -     [place] Level/Cues Needed (Transfer Goal 1, OT) moderate assist (50-74% patient effort);verbal cues required;nonverbal cues (demo/gesture) required  -     Time Frame (Transfer Goal 1, OT) long term goal (LTG);by discharge  -     Progress/Outcome (Transfer Goal 1, OT) new goal  -       Row Name 11/15/24 1442          Dressing Goal 1 (OT)    Activity/Device (Dressing Goal 1, OT) lower body dressing  -     [place]/Cues Needed (Dressing Goal 1, OT) moderate assist (50-74% patient effort)  -     Time Frame (Dressing Goal 1, OT) long term goal (LTG);by discharge  -     Progress/Outcome (Dressing Goal 1, OT) new goal  -       Row Name 11/15/24 1442          Toileting Goal 1 (OT)    Activity/Device (Toileting Goal 1, OT) toileting skills, all;adjust/manage clothing;perform perineal hygiene;commode, 3-in-1  -     [place] Level/Cues Needed (Toileting Goal 1, OT) moderate assist (50-74% patient effort)  -     Time Frame (Toileting Goal 1, OT) long term goal (LTG);by discharge  -     Progress/Outcome (Toileting Goal 1, OT) new [facility]  -       Row Name 11/15/24 1442          Therapy Assessment/Plan (OT)    Planned Therapy Interventions (OT) activity tolerance training;adaptive equipment training;BADL retraining;functional balance retraining;occupation/activity based interventions;patient/caregiver education/training;strengthening exercise;transfer/mobility retraining;wheelchair assessment/training;ROM/therapeutic exercise  -               User Key  (r) = Recorded By, (t) = Taken By, (c) = Cosigned By      Initials Name Provider Type    CH Jennifer Briceno, OTR/L Occupational Therapist                   Clinical Impression       Row Name 11/15/24 1442          Pain Assessment     Additional Documentation Pain Scale: FACES Pre/Post-Treatment (Group)  -       Row Name 11/15/24 1442          Pain Scale: FACES Pre/Post-Treatment    Pain: FACES Scale, Pretreatment 0-->no hurt  -     Posttreatment Pain Rating 6-->hurts even more  -     Pre/Posttreatment Pain Comment generalized BLEs  -       Row Name 11/15/24 1442          Plan of Care Review    Plan of Care Reviewed With patient  -     Progress no change  -     Outcome Evaluation OT eval complete.  Pt. is AxO x 2 & while is conversational she is also confused.  Ms. Gomes was able to follow commands and come to EOB at S.  [facility] to KARLOS socks.  Ms. Gomes was then able to scoot toward EOB and to HOB utilizing good dynamic sitting posture and vc's to reposition herself.  She demo's generalized weakness and debility.  OTR and pt attempted to stand x 2 and pt demo'd weakness, fear of falling, inihibition, poor body mechs, and was ultimately unsuccessful.  Found pt to have had incontinent episode so assisted her to supine to complete hygieve and wilmer change. She will require cont'd OT tx to improve her fxl capacity while hospitalized and at DC.  Rec SNF placement.  -       Row Name 11/15/24 1442          Therapy Assessment/Plan (OT)    Patient/Family Therapy Goal Statement (OT) DC to SNF  -     Rehab Potential (OT) limited  -     Criteria for Skilled Therapeutic Interventions Met (OT) yes;skilled treatment is necessary  -     Therapy Frequency (OT) 5 times/wk  -     Predicted Duration of Therapy Intervention (OT) 10 days  -       Row Name 11/15/24 1442          Therapy Plan Review/Discharge Plan (OT)    Anticipated Discharge Disposition (OT) skilled nursing facility  -       Row Name 11/15/24 1442          Positioning and Restraints    Pre-Treatment Position in bed  -     Post Treatment Position bed  -     In Bed fowlers;call light within reach;encouraged to call for assist;side rails up x3;with nsg  -                User Key  (r) = Recorded By, (t) = Taken By, (c) = Cosigned By      Initials Name Provider Type    Jennifer Villegas OTR/L Occupational Therapist                   Outcome Measures       Row Name 11/15/24 1442          How much help from another is currently needed...    Putting on and taking off regular lower body clothing? 2  -CH     Bathing (including washing, rinsing, and drying) 2  -CH     Toileting (which includes using toilet bed pan or urinal) 2  -CH     Putting on and taking off regular upper body clothing 2  -CH     Taking care of personal grooming (such as brushing teeth) 3  -CH     Eating meals 4  -CH     AM-PAC 6 Clicks Score (OT) 15  -CH       Row Name 11/15/24 0800          How much help from another person do you currently need...    Turning from your back to your side while in flat bed without using bedrails? 4  -AR     Moving from lying on back to sitting on the side of a flat bed without bedrails? 3  -AR     Moving to and from a bed to a chair (including a wheelchair)? 2  -AR     Standing up from a chair using your arms (e.g., wheelchair, bedside chair)? 2  -AR     Climbing 3-5 steps with a railing? 2  -AR     To walk in hospital room? 2  -AR     AM-PAC 6 Clicks Score (PT) 15  -AR     Highest Level of Mobility Goal 4 --> Transfer to chair/commode  -AR       Row Name 11/15/24 1442          Functional Assessment    Outcome Measure Options AM-PAC 6 Clicks Daily Activity (OT)  -CH               User Key  (r) = Recorded By, (t) = Taken By, (c) = Cosigned By      Initials Name Provider Type    Jennifer Villegas OTR/L Occupational Therapist    Cassidy Nation, RN Registered Nurse                    Occupational Therapy Education       Title: PT OT SLP Therapies (In Progress)       Topic: Occupational Therapy (In Progress)       Point: ADL training (Done)       Description:   Instruct learner(s) on proper safety adaptation and remediation techniques during self care or transfers.   Instruct in  proper use of assistive devices.                  Learning Progress Summary            Patient Acceptance, E, VU by  at 11/15/2024 1544                      Point: Home exercise program (Not Started)       Description:   Instruct learner(s) on appropriate technique for monitoring, assisting and/or progressing therapeutic exercises/activities.                  Learner Progress:  Not documented in this visit.              Point: Precautions (Done)       Description:   Instruct learner(s) on prescribed precautions during self-care and functional transfers.                  Learning Progress Summary            Patient Acceptance, E, VU by  at 11/15/2024 1544                      Point: Body mechanics (Done)       Description:   Instruct learner(s) on proper positioning and spine alignment during self-care, functional mobility activities and/or exercises.                  Learning Progress Summary            Patient Acceptance, E, VU by  at 11/15/2024 1544                                      User Key       Initials Effective Dates Name Provider Type Discipline     07/11/23 -  Jennifer Briceno, OTR/L Occupational Therapist OT                  OT Recommendation and Plan  Planned Therapy Interventions (OT): activity tolerance training, adaptive equipment training, BADL retraining, functional balance retraining, occupation/activity based interventions, patient/caregiver education/training, strengthening exercise, transfer/mobility retraining, wheelchair assessment/training, ROM/therapeutic exercise  Therapy Frequency (OT): 5 times/wk  Plan of Care Review  Plan of Care Reviewed With: patient  Progress: no change  Outcome Evaluation: OT eval complete.  Pt. is AxO x 2 & while is conversational she is also confused.  Ms. Gomes was able to follow commands and come to EOB at S.  She has at [facility] to KARLOS socks.  Ms. Gomes was then able to scoot toward EOB and to HOB utilizing good dynamic sitting posture and vc's to reposition  herself.  She demo's generalized weakness and debility.  OTR and pt attempted to stand x 2 and pt demo'd weakness, fear of falling, inihibition, poor body mechs, and was ultimately unsuccessful.  Found pt to have had incontinent episode so assisted her to supine to complete hygieve and wilmer change. She will require cont'd OT tx to improve her fxl capacity while hospitalized and at DC.  Rec SNF placement.     Time Calculation:         Time Calculation- OT       Row Name 11/15/24 1442             Time Calculation- OT    OT Start Time 1442  -CH      OT Stop Time 1530  -CH      OT Time Calculation (min) 48 min  -CH      OT Received On 11/15/24  -CH      OT Goal Re-Cert Due Date 11/25/24  -CH         Untimed Charges    OT Eval/Re-eval Minutes 48  -CH         Total Minutes    Untimed Charges Total Minutes 48  -CH       Total Minutes 48  -CH                User Key  (r) = Recorded By, (t) = Taken By, (c) = Cosigned By      Initials Name Provider Type     Jennifer Briceno OTR/L Occupational Therapist                           ZEESHAN Douglas/IVANA  11/15/2024

## 2024-11-16 LAB
ANION GAP SERPL CALCULATED.3IONS-SCNC: 8 MMOL/L (ref 5–15)
BH BB BLOOD EXPIRATION DATE: NORMAL
BH BB BLOOD TYPE BARCODE: 5100
BH BB DISPENSE STATUS: NORMAL
BH BB PRODUCT CODE: NORMAL
BH BB UNIT NUMBER: NORMAL
BUN SERPL-MCNC: 22 MG/DL (ref 8–23)
BUN/CREAT SERPL: 18 (ref 7–25)
CALCIUM SPEC-SCNC: 9.5 MG/DL (ref 8.6–10.5)
CHLORIDE SERPL-SCNC: 114 MMOL/L (ref 98–107)
CO2 SERPL-SCNC: 17 MMOL/L (ref 22–29)
CREAT SERPL-MCNC: 1.22 MG/DL (ref 0.57–1)
CROSSMATCH INTERPRETATION: NORMAL
DEPRECATED RDW RBC AUTO: 50.7 FL (ref 37–54)
EGFRCR SERPLBLD CKD-EPI 2021: 44.4 ML/MIN/1.73
ERYTHROCYTE [DISTWIDTH] IN BLOOD BY AUTOMATED COUNT: 16 % (ref 12.3–15.4)
GLUCOSE SERPL-MCNC: 108 MG/DL (ref 65–99)
HCT VFR BLD AUTO: 25.9 % (ref 34–46.6)
HGB BLD-MCNC: 8.7 G/DL (ref 12–15.9)
MCH RBC QN AUTO: 29.6 PG (ref 26.6–33)
MCHC RBC AUTO-ENTMCNC: 33.6 G/DL (ref 31.5–35.7)
MCV RBC AUTO: 88.1 FL (ref 79–97)
PHOSPHATE SERPL-MCNC: 4 MG/DL (ref 2.5–4.5)
PLATELET # BLD AUTO: 134 10*3/MM3 (ref 140–450)
PMV BLD AUTO: 10.1 FL (ref 6–12)
POTASSIUM SERPL-SCNC: 4.1 MMOL/L (ref 3.5–5.2)
RBC # BLD AUTO: 2.94 10*6/MM3 (ref 3.77–5.28)
SODIUM SERPL-SCNC: 139 MMOL/L (ref 136–145)
UNIT  ABO: NORMAL
UNIT  RH: NORMAL
WBC NRBC COR # BLD AUTO: 7.52 10*3/MM3 (ref 3.4–10.8)

## 2024-11-16 PROCEDURE — 80048 BASIC METABOLIC PNL TOTAL CA: CPT | Performed by: FAMILY MEDICINE

## 2024-11-16 PROCEDURE — 85027 COMPLETE CBC AUTOMATED: CPT | Performed by: FAMILY MEDICINE

## 2024-11-16 PROCEDURE — 84100 ASSAY OF PHOSPHORUS: CPT | Performed by: FAMILY MEDICINE

## 2024-11-16 RX ORDER — AMLODIPINE BESYLATE 5 MG/1
5 TABLET ORAL
Status: DISCONTINUED | OUTPATIENT
Start: 2024-11-16 | End: 2024-11-18 | Stop reason: HOSPADM

## 2024-11-16 RX ADMIN — DONEPEZIL HYDROCHLORIDE 10 MG: 10 TABLET, FILM COATED ORAL at 20:12

## 2024-11-16 RX ADMIN — AMLODIPINE BESYLATE 5 MG: 5 TABLET ORAL at 09:20

## 2024-11-16 RX ADMIN — ATORVASTATIN CALCIUM 40 MG: 40 TABLET, FILM COATED ORAL at 09:20

## 2024-11-16 RX ADMIN — CEFDINIR 300 MG: 300 CAPSULE ORAL at 09:20

## 2024-11-16 RX ADMIN — SERTRALINE HYDROCHLORIDE 25 MG: 50 TABLET ORAL at 09:20

## 2024-11-16 RX ADMIN — Medication 10 ML: at 09:21

## 2024-11-16 RX ADMIN — ACETAMINOPHEN 650 MG: 325 TABLET, FILM COATED ORAL at 10:34

## 2024-11-16 RX ADMIN — LEVOTHYROXINE SODIUM 50 MCG: 0.05 TABLET ORAL at 05:52

## 2024-11-16 RX ADMIN — CARVEDILOL 3.12 MG: 3.12 TABLET, FILM COATED ORAL at 09:20

## 2024-11-16 RX ADMIN — PANTOPRAZOLE SODIUM 40 MG: 40 TABLET, DELAYED RELEASE ORAL at 05:52

## 2024-11-16 RX ADMIN — CARVEDILOL 3.12 MG: 3.12 TABLET, FILM COATED ORAL at 16:32

## 2024-11-16 NOTE — PLAN OF CARE
Goal Outcome Evaluation:  Plan of Care Reviewed With: patient        Progress: improving  Outcome Evaluation: VSS.  S 64-95, per tele.  Tylenol given x1 this shift.  Safety maintained.

## 2024-11-16 NOTE — PLAN OF CARE
Goal Outcome Evaluation:   Disoriented to situation- forgetful & restless, on room air, resting in bed. Family at bedside during evening. VSS. Hourly rounding. 1 unit PRBC infused- H/H improved- see results. Unable to ambulate- x2 assist. IC bowel x2. [place] intact & draining appropriately. SW discussed d/c plans w/ patient at bedside- awaiting SNF placement. Fall risk- bed alarm on- safety maintained. Will continue to monitor until change of shift.

## 2024-11-16 NOTE — PROGRESS NOTES
[facility] ER Medicine Services  INPATIENT PROGRESS NOTE    Patient Name: Jennifer Gomes  Date of Admission: 11/12/2024  Today's Date: 11/16/24  Length of Stay: 4  Primary Care Physician: Provider, No Known    Subjective   Chief Complaint: Abdominal pain  Abdominal Pain    Weakness - Generalized  Associated symptoms include abdominal pain.      84-year-old female with history of hypertension, chronic kidney disease stage III, chronic pain, dementia, hypothyroidism, GERD, hyperlipidemia, discharged from the hospital October 2024, at that time admitted for acute renal insufficiency associated to bilateral hydronephrosis.  Patient had a Smith catheter placed.    Patient was admitted 11/12/2024 after she accidentally pulled out Smith catheter.  Found to have acute renal insufficiency, urinary tract infection; I started taking care of this patient 11/13/2024    No family member present.  Had episode of atrial fibrillation 11/13/24, required Cardizem drip and transferred to 4 floor, converted to sinus rhythm at the end of the day.    She was started on Lovenox full anticoagulation.    Feeling better today.  On my evaluation oriented x 3.  No pain reported.  Smith catheter in place        Review of Systems   Gastrointestinal:  Positive for abdominal pain.      All pertinent negatives and positives are as above. All other systems have been reviewed and are negative unless otherwise stated.     Objective    Temp:  [97.9 °F (36.6 °C)-98.6 °F (37 °C)] 97.9 °F (36.6 °C)  Heart Rate:  [64-80] 64  Resp:  [16-18] 16  BP: (134-162)/(40-72) 155/53  Physical Exam  Constitutional:       Appearance: Alert, oriented to place time and person.  No respiratory distress.  HENT:      Head: Normocephalic and atraumatic.      Nose: Nose normal.      Mouth/Throat:      Mouth: Mucous membranes are moist.   Eyes:      Extraocular Movements: Extraocular movements intact.      Conjunctiva/sclera: Conjunctivae  "normal.   Cardiovascular:      Rate and Rhythm: Normal rate and regular rhythm.      Pulses: Normal pulses.   Pulmonary:      Effort: No respiratory distress.      Breath sounds: Normal breath sounds. No wheezing, rhonchi or rales.   Abdominal:      General: Abdomen is flat. Bowel sounds are normal.      Palpations: Abdomen is soft.      Tenderness: There is no guarding or rebound.   Genitourinary.  Smith catheter in place  Extremities:  No lower extremity edema.  Skin:     Capillary Refill: Capillary refill takes less than 2 seconds.      Coloration: Skin is not jaundiced.      Findings: No rash.   Neurological:      General: No focal deficit present.      Mental Status: Patient is alert, disoriented to situation.     Sensory: No sensory deficit.      Motor: No weakness.      Coordination: Coordination normal.          Results Review:  I have reviewed the labs, radiology results, and diagnostic studies.    Laboratory Data:   Results from last 7 days   Lab Units 11/16/24  0324 11/15/24  1624 11/15/24  0313 11/14/24  1614   WBC 10*3/mm3 7.52 7.91  --  6.45   HEMOGLOBIN g/dL 8.7* 7.9* 6.6* 7.0*   HEMATOCRIT % 25.9* 24.3* 20.8* 21.9*   PLATELETS 10*3/mm3 134* 139*  --  139*        Results from last 7 days   Lab Units 11/16/24  0324 11/15/24  0313 11/14/24  1614 11/13/24  0441 11/12/24  1258   SODIUM mmol/L 139 141 141   < > 142   POTASSIUM mmol/L 4.1 4.7 2.8*  3.0*   < > 3.3*   CHLORIDE mmol/L 114* 117* 114*   < > 109*   CO2 mmol/L 17.0* 16.0* 19.0*   < > 17.0*   BUN mg/dL 22 27* 27*   < > 42*   CREATININE mg/dL 1.22* 1.37* 1.55*   < > 2.46*   CALCIUM mg/dL 9.5 9.2 9.5   < > 10.7*   BILIRUBIN mg/dL  --   --   --   --  0.3   ALK PHOS U/L  --   --   --   --  93   ALT (SGPT) U/L  --   --   --   --  6   AST (SGOT) U/L  --   --   --   --  9   GLUCOSE mg/dL 108* 117* 128*   < > 187*    < > = values in this interval not displayed.       Culture Data:   No results found for: \"BLOODCX\", \"URINECX\", \"WOUNDCX\", \"MRSACX\", " "\"RESPCX\", \"STOOLCX\"    Radiology Data:   Imaging Results (Last 24 Hours)       Procedure Component Value Units Date/Time    US Renal Bilateral [[id number]] Collected: 11/15/24 1508     Updated: 11/15/24 1512    Narrative:      EXAMINATION: US RENAL BILATERAL-     11/15/2024 1:59 PM     HISTORY: hydronephrosis; N17.9-Acute kidney failure, unspecified;  N30.00-Acute cystitis without hematuria; Z74.09-Other reduced mobility     Grayscale and color-flow renal ultrasound.     Difficult exam as the patient was unable to cooperate.  Limited detail.     Normal size and position of both kidneys.  Normal cortical thickness and normal cortical echogenicity.     No hydronephrosis or shadowing stone.     The right kidney measures 82 x 46 x 39 mm.     The left kidney measures 111 x 67 x 52 mm.       Impression:      Impression:  1. No acute kidney abnormality is seen.           This report was signed and finalized on 11/15/2024 3:08 PM by Dr. Adam Monroy MD.               I have reviewed the patient's current medications.     Assessment/Plan   Assessment  Active Hospital Problems    Diagnosis     **Acute renal failure superimposed on stage 3 chronic kidney disease     Paroxysmal atrial fibrillation     Acute cystitis     Hypercalcemia     Dementia     Chronic constipation     Anemia     Essential hypertension        Acute kidney injury/renal insufficiency on chronic kidney disease stage IIIb resolved  Paroxysmal atrial fibrillation  Acute on chronic anemia  History of bilateral hydronephrosis  Urinary tract infection, E. coli  Constipation  Hypokalemia  Hypophosphatemia  Chronic normocytic anemia        Potassium has improved to 4.1.  Phosphorus 4.0.  Creatinine 1.22, down from 2.46  Sodium 139    Hemoglobin 6.6 yesterday, transfused and today 8.7; there is no report of hematuria, no melena, no hematemesis, no rectal bleeding.    Urine culture with more than 100,000 Ecoli pansensitive  Blood cultures negative so far    B12 " 523    Renal ultrasound 11/15/2024, no abnormalities seen.    Treatment Plan  Received ceftriaxone 4 days; on cefdinir to complete 7 days of treatment  Continue Smith catheter.  Follow renal function and electrolytes.  Replace potassium and phosphorus per protocol.    Due to acute on chronic anemia, anticoagulation placed on hold.  Patient may not be a candidate for long-term anticoagulation given anemia.    Pending approval skilled nursing facility.  Patient may be discharged once arranged.    Medical Decision Making  Number and Complexity of problems: More than 10, moderate to high complexity  Differential Diagnosis: See above    Conditions and Status        Condition is unchanged.     [facility] Data  External documents reviewed: None  Cardiac tracing (EKG, telemetry) interpretation: Atrial fibrillation  Radiology interpretation: Radiology reports reviewed  Labs reviewed: Yes  Any tests that were considered but not ordered: No     Decision rules/scores evaluated (example [id number], Wells, etc): See chart     Discussed with: Patient and nurse     Care Planning  Shared decision making: With patient  Code status and discussions: Full code    Disposition  Social Determinants of Health that impact treatment or disposition: None  I expect the patient to be discharged to skilled facility once arranged.        Electronically signed by Cuba Espitia MD, 11/16/24, 09:14 CST.

## 2024-11-17 LAB
ANION GAP SERPL CALCULATED.3IONS-SCNC: 13 MMOL/L (ref 5–15)
BACTERIA SPEC AEROBE CULT: NORMAL
BACTERIA SPEC AEROBE CULT: NORMAL
BUN SERPL-MCNC: 21 MG/DL (ref 8–23)
BUN/CREAT SERPL: 17.2 (ref 7–25)
CALCIUM SPEC-SCNC: 9.4 MG/DL (ref 8.6–10.5)
CHLORIDE SERPL-SCNC: 107 MMOL/L (ref 98–107)
CO2 SERPL-SCNC: 19 MMOL/L (ref 22–29)
CREAT SERPL-MCNC: 1.22 MG/DL (ref 0.57–1)
EGFRCR SERPLBLD CKD-EPI 2021: 44.4 ML/MIN/1.73
GLUCOSE SERPL-MCNC: 139 MG/DL (ref 65–99)
POTASSIUM SERPL-SCNC: 3.4 MMOL/L (ref 3.5–5.2)
SODIUM SERPL-SCNC: 139 MMOL/L (ref 136–145)

## 2024-11-17 PROCEDURE — 97110 THERAPEUTIC EXERCISES: CPT

## 2024-11-17 PROCEDURE — 80048 BASIC METABOLIC PNL TOTAL CA: CPT | Performed by: FAMILY MEDICINE

## 2024-11-17 PROCEDURE — 97530 THERAPEUTIC ACTIVITIES: CPT

## 2024-11-17 RX ORDER — BUTALBITAL, ACETAMINOPHEN AND CAFFEINE 50; 325; 40 MG/1; MG/1; MG/1
1 TABLET ORAL EVERY 6 HOURS PRN
Status: DISCONTINUED | OUTPATIENT
Start: 2024-11-17 | End: 2024-11-18 | Stop reason: HOSPADM

## 2024-11-17 RX ORDER — POTASSIUM CHLORIDE 1500 MG/1
40 TABLET, EXTENDED RELEASE ORAL EVERY 4 HOURS
Status: COMPLETED | OUTPATIENT
Start: 2024-11-17 | End: 2024-11-17

## 2024-11-17 RX ORDER — QUETIAPINE FUMARATE 25 MG/1
50 TABLET, FILM COATED ORAL NIGHTLY
Status: DISCONTINUED | OUTPATIENT
Start: 2024-11-17 | End: 2024-11-18 | Stop reason: HOSPADM

## 2024-11-17 RX ORDER — OXYCODONE HYDROCHLORIDE 5 MG/1
5 TABLET ORAL EVERY 8 HOURS PRN
Status: DISCONTINUED | OUTPATIENT
Start: 2024-11-17 | End: 2024-11-18 | Stop reason: HOSPADM

## 2024-11-17 RX ADMIN — POTASSIUM CHLORIDE 40 MEQ: 1500 TABLET, EXTENDED RELEASE ORAL at 15:11

## 2024-11-17 RX ADMIN — POTASSIUM CHLORIDE 40 MEQ: 1500 TABLET, EXTENDED RELEASE ORAL at 11:59

## 2024-11-17 RX ADMIN — Medication 10 ML: at 09:17

## 2024-11-17 RX ADMIN — ATORVASTATIN CALCIUM 40 MG: 40 TABLET, FILM COATED ORAL at 09:12

## 2024-11-17 RX ADMIN — CYCLOBENZAPRINE 5 MG: 10 TABLET, FILM COATED ORAL at 20:56

## 2024-11-17 RX ADMIN — CARVEDILOL 3.12 MG: 3.12 TABLET, FILM COATED ORAL at 09:11

## 2024-11-17 RX ADMIN — QUETIAPINE FUMARATE 50 MG: 25 TABLET ORAL at 20:38

## 2024-11-17 RX ADMIN — CEFDINIR 300 MG: 300 CAPSULE ORAL at 09:12

## 2024-11-17 RX ADMIN — CARVEDILOL 3.12 MG: 3.12 TABLET, FILM COATED ORAL at 17:04

## 2024-11-17 RX ADMIN — OXYCODONE HYDROCHLORIDE 5 MG: 5 TABLET ORAL at 15:11

## 2024-11-17 RX ADMIN — AMLODIPINE BESYLATE 5 MG: 5 TABLET ORAL at 09:11

## 2024-11-17 RX ADMIN — DONEPEZIL HYDROCHLORIDE 10 MG: 10 TABLET, FILM COATED ORAL at 20:38

## 2024-11-17 RX ADMIN — CYCLOBENZAPRINE 5 MG: 10 TABLET, FILM COATED ORAL at 04:13

## 2024-11-17 RX ADMIN — QUETIAPINE FUMARATE 25 MG: 25 TABLET ORAL at 00:28

## 2024-11-17 RX ADMIN — ACETAMINOPHEN 650 MG: 325 TABLET, FILM COATED ORAL at 11:59

## 2024-11-17 RX ADMIN — LEVOTHYROXINE SODIUM 50 MCG: 0.05 TABLET ORAL at 06:42

## 2024-11-17 RX ADMIN — Medication 10 MG: at 00:28

## 2024-11-17 RX ADMIN — Medication 10 ML: at 20:40

## 2024-11-17 RX ADMIN — SERTRALINE HYDROCHLORIDE 25 MG: 50 TABLET ORAL at 09:12

## 2024-11-17 RX ADMIN — PANTOPRAZOLE SODIUM 40 MG: 40 TABLET, DELAYED RELEASE ORAL at 06:42

## 2024-11-17 RX ADMIN — ACETAMINOPHEN 650 MG: 325 TABLET, FILM COATED ORAL at 04:13

## 2024-11-17 RX ADMIN — CYCLOBENZAPRINE 5 MG: 10 TABLET, FILM COATED ORAL at 11:59

## 2024-11-17 NOTE — PROGRESS NOTES
[facility] Medicine Services  INPATIENT PROGRESS NOTE    Patient Name: Jennifer Gomes  Date of Admission: 11/12/2024  Today's Date: 11/17/24  Length of Stay: 5  Primary Care Physician: Provider, No Known    Subjective   Chief Complaint: Abdominal pain  Abdominal Pain    Weakness - Generalized  Associated symptoms include abdominal pain.      84-year-old female with history of hypertension, chronic kidney disease stage III, chronic pain, dementia, hypothyroidism, GERD, hyperlipidemia, discharged from the hospital October 2024, at that time admitted for acute renal insufficiency associated to bilateral hydronephrosis.  Patient had a Smith catheter placed.    Patient was admitted 11/12/2024 after she accidentally pulled out Smith catheter.  Found to have acute renal insufficiency, urinary tract infection; I started taking care of this patient 11/13/2024    No family member present.  Had episode of atrial fibrillation 11/13/24, required Cardizem drip and transferred to 4 floor, converted to sinus rhythm at the end of the day.    She was started on Lovenox full anticoagulation.    Feeling better today.  She is asking for Seroquel and melatonin dosage to be increased for better sleep  No pain reported.  No palpitations.  No chest pain  Smith catheter in place        Review of Systems   Gastrointestinal:  Positive for abdominal pain.      All pertinent negatives and positives are as above. All other systems have been reviewed and are negative unless otherwise stated.     Objective    Temp:  [97.4 °F (36.3 °C)-98.6 °F (37 °C)] 97.4 °F (36.3 °C)  Heart Rate:  [63-72] 67  Resp:  [16-18] 18  BP: (138-151)/(58-68) 138/60  Physical Exam  Constitutional:       Appearance: Alert, oriented to place time and person.  No respiratory distress.  HENT:      Head: Normocephalic and atraumatic.      Nose: Nose normal.      Mouth/Throat:      Mouth: Mucous membranes are moist.   Eyes:      Extraocular  Movements: Extraocular movements intact.      Conjunctiva/sclera: Conjunctivae normal.   Cardiovascular:      Rate and Rhythm: Normal rate and regular rhythm.      Pulses: Normal pulses.   Pulmonary:      Effort: No respiratory distress.      Breath sounds: Normal breath sounds. No wheezing, rhonchi or rales.   Abdominal:      General: Abdomen is flat. Bowel sounds are normal.      Palpations: Abdomen is soft.      Tenderness: There is no guarding or rebound.   Genitourinary.  Smith catheter in place  Extremities:  No lower extremity edema.  Skin:     Capillary Refill: Capillary refill takes less than 2 seconds.      Coloration: Skin is not jaundiced.      Findings: No rash.   Neurological:      General: No focal deficit present.      Mental Status: Patient is alert, disoriented to situation.     Sensory: No sensory deficit.      Motor: No weakness.      Coordination: Coordination normal.          Results Review:  I have reviewed the labs, radiology results, and diagnostic studies.    Laboratory Data:   Results from last 7 days   Lab Units 11/16/24  0324 11/15/24  1624 11/15/24  0313 11/14/24  1614   WBC 10*3/mm3 7.52 7.91  --  6.45   HEMOGLOBIN g/dL 8.7* 7.9* 6.6* 7.0*   HEMATOCRIT % 25.9* 24.3* 20.8* 21.9*   PLATELETS 10*3/mm3 134* 139*  --  139*        Results from last 7 days   Lab Units 11/16/24  0324 11/15/24  0313 11/14/24  1614 11/13/24  0441 11/12/24  1258   SODIUM mmol/L 139 141 141   < > 142   POTASSIUM mmol/L 4.1 4.7 2.8*  3.0*   < > 3.3*   CHLORIDE mmol/L 114* 117* 114*   < > 109*   CO2 mmol/L 17.0* 16.0* 19.0*   < > 17.0*   BUN mg/dL 22 27* 27*   < > 42*   CREATININE mg/dL 1.22* 1.37* 1.55*   < > 2.46*   CALCIUM mg/dL 9.5 9.2 9.5   < > 10.7*   BILIRUBIN mg/dL  --   --   --   --  0.3   ALK PHOS U/L  --   --   --   --  93   ALT (SGPT) U/L  --   --   --   --  6   AST (SGOT) U/L  --   --   --   --  9   GLUCOSE mg/dL 108* 117* 128*   < > 187*    < > = values in this interval not displayed.       Culture  "Data:   No results found for: \"BLOODCX\", \"URINECX\", \"WOUNDCX\", \"MRSACX\", \"RESPCX\", \"STOOLCX\"    Radiology Data:   Imaging Results (Last 24 Hours)       ** No results found for the last 24 hours. **            I have reviewed the patient's current medications.     Assessment/Plan   Assessment  Active Hospital Problems    Diagnosis     **Acute renal failure superimposed on stage 3 chronic kidney disease     Paroxysmal atrial fibrillation     Acute cystitis     Hypercalcemia     Dementia     Chronic constipation     Anemia     Essential hypertension        Acute kidney injury/renal insufficiency on chronic kidney disease stage IIIb resolved  Paroxysmal atrial fibrillation  Acute on chronic anemia  History of bilateral hydronephrosis  Urinary tract infection, E. coli  Constipation  Hypokalemia  Hypophosphatemia  Chronic normocytic anemia        Potassium improved to 4.1.  Phosphorus 4.0.  Creatinine 1.22, down from 2.46  Sodium 139    Hemoglobin 6.6 yesterday, transfused and improved to 8.7; there is no report of hematuria, no melena, no hematemesis, no rectal bleeding.    Urine culture with more than 100,000 Ecoli pansensitive  Blood cultures negative so far    B12 523    Renal ultrasound 11/15/2024, no abnormalities seen.    Treatment Plan  Received ceftriaxone 4 days; on cefdinir to complete 7 days of treatment  Continue Smith catheter.  Follow renal function and electrolytes.    No more episodes of atrial fibrillation.  Due to acute on chronic anemia, anticoagulation was placed on hold.  Patient may not be a candidate for long-term anticoagulation given anemia.    On carvedilol 3.125 p.o. twice daily, amlodipine 5 mg p.o. daily.    Seroquel dose increased from 25 to 50 mg with history of patient's request.    Recommended patient to avoid sumatriptan given vascular disease.    Pending approval skilled nursing facility.    Patient may be discharged once arranged.    Medical Decision Making  Number and Complexity of " problems: More than 10, moderate to high complexity  Differential Diagnosis: See above    Conditions and Status        Condition is unchanged.     [facility] Data  External documents reviewed: None  Cardiac tracing (EKG, telemetry) interpretation: Atrial fibrillation  Radiology interpretation: Radiology reports reviewed  Labs reviewed: Yes  Any tests that were considered but not ordered: No     Decision rules/scores evaluated (example [id number], Wells, etc): See chart     Discussed with: Patient and nurse     Care Planning  Shared decision making: With patient  Code status and discussions: Full code    Disposition  Social Determinants of Health that impact treatment or disposition: None  I expect the patient to be discharged to skilled facility once arranged.        Electronically signed by Cuba Espitia MD, 11/17/24, 09:30 CST.

## 2024-11-17 NOTE — THERAPY TREATMENT NOTE
Acute Care - Physical Therapy Treatment Note  [facility]     Patient Name: Jennifer Gomes  : 1940  MRN: [id number]  Today's Date: 2024      Visit Dx:     ICD-10-CM ICD-9-CM   1. TOMÁS (acute kidney injury)  N17.9 584.9   2. Acute cystitis without hematuria  N30.00 595.0   3. Impaired mobility [Z74.09]  Z74.09 799.89     Patient Active Problem List   Diagnosis    Gastroesophageal reflux disease    Spinal stenosis, lumbar region, without neurogenic claudication    Erosion of vaginal mesh    Adenocarcinoma of endometrium    S/P hysterectomy with oophorectomy    Encounter for follow-up surveillance of endometrial cancer    History of radiation therapy    Non-traumatic rhabdomyolysis    Acute renal failure superimposed on stage 3 chronic kidney disease    Medical non-compliance, does not take narcotics as prescribed    Chronic constipation    Chronic pain syndrome    Chronic prescription opiate use    Anemia    Hypothyroidism (acquired)    Essential hypertension    Venous insufficiency of both lower extremities    Chronic intractable headache    RAFAL (obstructive sleep apnea)    Acute encephalopathy due to UTI    Toxic metabolic encephalopathy    Polypharmacy    Frequent falls    Grade III internal hemorrhoids    External hemorrhoids    Colitis    Moderate malnutrition    Transaminitis    Acute UTI (urinary tract infection)    Polypharmacy    UTI (urinary tract infection)    Dementia    Hypokalemia    Sepsis due to urinary tract infection    Cardiopulmonary arrest    E coli bacteremia    Anemia requiring transfusions    Bilateral hydronephrosis    Acute urinary retention    TOMÁS (acute kidney injury)    Acute cystitis    Hypercalcemia    Paroxysmal atrial fibrillation     Past Medical History:   Diagnosis Date    Anxiety     Arthritis     Bronchitis     Cancer     uterine    Chronic pain     Depression     Disease of thyroid gland     Fibromyalgia     GERD (gastroesophageal reflux disease)     Headache      History of transfusion     AS     Hyperlipidemia     Hypertension     Incontinence     Insomnia     Leg pain     Lumbar stenosis     Migraines     Peptic ulcer     Restless legs     Sleep apnea     NO C-PAP    UTI (urinary tract infection)     Vaginal bleeding      Past Surgical History:   Procedure Laterality Date    BLADDER REPAIR      MESH HAD TO BE REMOVED IN 2013    BREAST BIOPSY Right 2017    benign    BREAST CYST EXCISION Left 1982    CARDIAC CATHETERIZATION      CARPAL TUNNEL RELEASE      CATARACT EXTRACTION W/ INTRAOCULAR LENS  IMPLANT, BILATERAL      CHOLECYSTECTOMY WITH INTRAOPERATIVE CHOLANGIOGRAM N/A 2023    Procedure: CHOLECYSTECTOMY LAPAROSCOPIC INTRAOPERATIVE CHOLANGIOGRAM;  Surgeon: Gerardo Arciniega MD;  Location:  [facility] OR;  Service: General;  Laterality: N/A;    COLONOSCOPY      COLONOSCOPY N/A 10/01/2021    Procedure: COLONOSCOPY WITH ANESTHESIA;  Surgeon: Tom Velasco DO;  Location:  [facility];  Service: Gastroenterology;  Laterality: N/A;  pre: change in bowel habits  post: diverticulosis. hemorrhoids.   Olivia Mora APRN        CYSTECTOMY      D & C HYSTEROSCOPY N/A 2017    Procedure: DILATATION AND CURETTAGE HYSTEROSCOPY;  Surgeon: Shasta Madrigal MD;  Location: [facility] OR;  Service:     DILATION AND CURETTAGE, DIAGNOSTIC / THERAPEUTIC      ENDOSCOPY  2010    Short segment of Arriola's,Moderate chroninc esophagogastritis and negative H.pylori    ENDOSCOPY N/A 2017    Procedure: ESOPHAGOGASTRODUODENOSCOPY WITH ANESTHESIA;  Surgeon: Tom Velasco DO;  Location: [facility] ENDOSCOPY;  Service:     EYE SURGERY      RETINA    HEMORRHOIDECTOMY SIGMOIDOSCOPY N/A 3/21/2023    Procedure: HEMORRHOIDECTOMY WITH EXAM UNDER ANESTHESIA;  Surgeon: Holly Chavez MD;  Location:  [facility] OR;  Service: General;  Laterality: N/A;    HYSTERECTOMY  2017    ORIF TIBIA/FIBULA FRACTURES Left     TRANSVAGINAL TAPING SUSPENSION N/A 2017     Procedure: VAGINAL MESH REVISION;  Surgeon: Shasta Madrigal MD;  Location:  [facility] OR;  Service:     VAGINAL MESH REVISION  2013     PT Assessment (Last 12 Hours)       PT Evaluation and Treatment       Row Name 11/17/24 0928          Physical Therapy Time and Intention    Subjective Information complains of;weakness;pain  -AE     Document Type therapy note (daily note)  -AE     Mode of Treatment physical therapy  -AE       Row Name 11/17/24 0928          General Information    Existing Precautions/Restrictions fall  -AE       Row Name 11/17/24 0928          Pain    Pretreatment Pain Rating 7/10  -AE     Posttreatment Pain Rating 7/10  -AE     Pain Location shoulder;back;hip;knee  -AE     Pain Side/Orientation bilateral  -AE     Response to Pain Interventions activity participation with increased pain  -AE       Row Name 11/17/24 0928          Pain Scale: FACES Pre/Post-Treatment    Pain: FACES Scale, Pretreatment --  -AE       Row Name 11/17/24 0928          Bed Mobility    Supine-Sit [place] (Bed Mobility) minimum assist (75% patient effort)  -AE     Sit-Supine [place] (Bed Mobility) minimum assist (75% patient effort);verbal cues  -AE       Row Name 11/17/24 0928          Sit-Stand Transfer    Sit-Stand [place] (Transfers) moderate assist (50% patient effort);minimum assist (75% patient effort)  Pt progressed to CGA  -AE       Row Name 11/17/24 0928          Gait/Stairs (Locomotion)    [place] Level (Gait) contact guard  -AE     Assistive Device (Gait) walker, front-wheeled  -AE     Distance in Feet (Gait) --  3 STEPS TO HOB  -AE     Deviations/Abnormal Patterns (Gait) --  CUES FOR WEIGHTSHIFTING  -AE       Row Name 11/17/24 0928          Knee (Therapeutic Exercise)    Knee (Therapeutic Exercise) AROM (active range of motion)  -AE     Knee AROM (Therapeutic Exercise) LAQ (long arc quad);10 repetitions  -AE       Row Name 11/17/24 0928          Ankle (Therapeutic Exercise)    Ankle (Therapeutic  Exercise) AROM (active range of motion)  -AE     Ankle AROM (Therapeutic Exercise) 10 repetitions;plantarflexion;dorsiflexion  -AE       Row Name 11/17/24 0928 11/17/24 0626       Positioning and Restraints    Pre-Treatment Position in bed  -AE in bed  -AE    Post Treatment Position bed  -AE bed  -AE    In Bed side lying left;call light within reach;exit alarm on  -AE fowlers;call light within reach;exit alarm on  -AE              User Key  (r) = Recorded By, (t) = Taken By, (c) = Cosigned By      Initials Name Provider Type    AE Cherelle Chapman, PTA Physical Therapist Assistant                    Physical Therapy Education       Title: PT OT SLP Therapies (In Progress)       Topic: Physical Therapy (In Progress)       Point: Mobility training (Done)       Learning Progress Summary            Patient Eager, E, VU by AE at 11/17/2024 1014    Comment: ex's,t/f's    Acceptance, E,TB, NR,NL by AR at 11/15/2024 1104    Acceptance, E, NR by AJ at 11/14/2024 1607    Comment: role of PT, fall risk, calling for assist                      Point: Home exercise program (Done)       Learning Progress Summary            Patient Eager, E, VU by AE at 11/17/2024 1014    Comment: ex's,t/f's    Acceptance, E,TB, NR,NL by AR at 11/15/2024 1104    Acceptance, E, NR by AJ at 11/14/2024 1607    Comment: role of PT, fall risk, calling for assist                      Point: Body mechanics (In Progress)       Learning Progress Summary            Patient Acceptance, E,TB, NR,NL by AR at 11/15/2024 1104    Acceptance, E, NR by AJ at 11/14/2024 1607    Comment: role of PT, fall risk, calling for assist                      Point: Precautions (In Progress)       Learning Progress Summary            Patient Acceptance, E,TB, NR,NL by AR at 11/15/2024 1104    Acceptance, E, NR by AJ at 11/14/2024 1607    Comment: role of PT, fall risk, calling for assist                                      User Key       Initials Effective Dates Name Provider  Type Discipline    AE 02/03/23 -  Cherelle Chapman PTA Physical Therapist Assistant PT    AR 05/20/24 -  Cassidy Molina, RN Registered Nurse Nurse    AJ 08/15/24 -  Sunny Esquivel PT DPT Physical Therapist PT                  PT Recommendation and Plan     Progress: improving  Outcome Evaluation: Pt c/o generalized joint pain 7/10 with activity.She c/o pain in B shoulders,hips and knees.Pt was min x1 supine to sit and SBA for seated balance.She was mod x 1 to stand with RW then progressed to cga on 4th stand.Pt stood with knees flexed and was able to take 3 side steps towards HOB with RW.Pt would benefit from continued PT at SNF but states that she wants to go home.Pt states that she will have help but PTA unsure of details and if that is accurate.   Outcome Measures       Row Name 11/17/24 1000             How much help from another person do you currently need...    Turning from your back to your side while in flat bed without using bedrails? 4  -AE      Moving from lying on back to sitting on the side of a flat bed without bedrails? 3  -AE      Moving to and from a bed to a chair (including a wheelchair)? 3  -AE      Standing up from a chair using your arms (e.g., wheelchair, bedside chair)? 3  -AE      Climbing 3-5 steps with a railing? 1  -AE      To walk in hospital room? 2  -AE      AM-PAC 6 Clicks Score (PT) 16  -AE         Functional Assessment    Outcome Measure Options AM-PAC 6 Clicks Basic Mobility (PT)  -AE                User Key  (r) = Recorded By, (t) = Taken By, (c) = Cosigned By      Initials Name Provider Type    AE Cherelle Chapman PTA Physical Therapist Assistant                     Time Calculation:    PT Charges       Row Name 11/17/24 1015             Time Calculation    Start Time 0928  -AE      Stop Time 1008  -AE      Time Calculation (min) 40 min  -AE      PT Received On 11/17/24  -AE      PT Goal Re-Cert Due Date 11/24/24  -AE         Time Calculation- PT    Total Timed Code Minutes- PT 40  minute(s)  -AE         Timed Charges    25317 - PT Therapeutic Exercise Minutes 10  -AE      46202 - PT Therapeutic Activity Minutes 30  -AE         Total Minutes    Timed Charges Total Minutes 40  -AE       Total Minutes 40  -AE                User Key  (r) = Recorded By, (t) = Taken By, (c) = Cosigned By      Initials Name Provider Type    AE Cherelle Chapman PTA Physical Therapist Assistant                  Therapy Charges for Today       Code Description Service Date Service Provider Modifiers Qty    29982445494 HC PT THERAPEUTIC ACT EA 15 MIN 11/17/2024 Cherelle Chapman PTA GP 2    89184702008 HC PT THER PROC EA 15 MIN 11/17/2024 Cherelle Chapman PTA GP 1            PT G-Codes  Outcome Measure Options: AM-PAC 6 Clicks Basic Mobility (PT)  AM-PAC 6 Clicks Score (PT): 16  AM-PAC 6 Clicks Score (OT): 15    Cherelle Chapman PTA  11/17/2024

## 2024-11-17 NOTE — PLAN OF CARE
Goal Outcome Evaluation:  Plan of Care Reviewed With: patient        Progress: improving  Outcome Evaluation: Pt c/o generalized joint pain 7/10 with activity.She c/o pain in B shoulders,hips and knees.Pt was min x1 supine to sit and SBA for seated balance.She was mod x 1 to stand with RW then progressed to cga on 4th stand.Pt stood with knees flexed and was able to take 3 side steps towards HOB with RW.Pt would benefit from continued PT at SNF but states that she wants to go home.Pt states that she will have help but PTA unsure of details and if that is accurate.

## 2024-11-17 NOTE — PLAN OF CARE
Goal Outcome Evaluation:              Outcome Evaluation: VSS. Safety maintained. C/o generalized crampiness and achiness all over body upon awakening. Meds given.

## 2024-11-17 NOTE — PLAN OF CARE
Goal Outcome Evaluation:  Plan of Care Reviewed With: patient        Progress: no change  Outcome Evaluation: She co pain, medication given. Smith in place. Potassium given for a level of 3.4. Cont to monitor.

## 2024-11-18 VITALS
BODY MASS INDEX: 28.52 KG/M2 | RESPIRATION RATE: 18 BRPM | OXYGEN SATURATION: 97 % | DIASTOLIC BLOOD PRESSURE: 45 MMHG | WEIGHT: 155 LBS | HEIGHT: 62 IN | SYSTOLIC BLOOD PRESSURE: 129 MMHG | HEART RATE: 83 BPM | TEMPERATURE: 98.5 F

## 2024-11-18 LAB — POTASSIUM SERPL-SCNC: 4.4 MMOL/L (ref 3.5–5.2)

## 2024-11-18 PROCEDURE — 97110 THERAPEUTIC EXERCISES: CPT

## 2024-11-18 PROCEDURE — 97530 THERAPEUTIC ACTIVITIES: CPT

## 2024-11-18 PROCEDURE — 84132 ASSAY OF SERUM POTASSIUM: CPT | Performed by: FAMILY MEDICINE

## 2024-11-18 RX ORDER — BISACODYL 10 MG
10 SUPPOSITORY, RECTAL RECTAL DAILY PRN
Start: 2024-11-18

## 2024-11-18 RX ORDER — QUETIAPINE FUMARATE 25 MG/1
25 TABLET, FILM COATED ORAL NIGHTLY
Qty: 30 TABLET | Refills: 0 | Status: SHIPPED | OUTPATIENT
Start: 2024-11-18 | End: 2024-12-18

## 2024-11-18 RX ORDER — CEFDINIR 300 MG/1
300 CAPSULE ORAL
Qty: 2 CAPSULE | Refills: 0 | Status: SHIPPED | OUTPATIENT
Start: 2024-11-18 | End: 2024-11-20

## 2024-11-18 RX ORDER — AMOXICILLIN 250 MG
2 CAPSULE ORAL 2 TIMES DAILY
Start: 2024-11-18

## 2024-11-18 RX ORDER — BISACODYL 5 MG/1
5 TABLET, DELAYED RELEASE ORAL DAILY PRN
Start: 2024-11-18

## 2024-11-18 RX ORDER — CARVEDILOL 3.12 MG/1
3.12 TABLET ORAL 2 TIMES DAILY WITH MEALS
Qty: 60 TABLET | Refills: 0 | Status: SHIPPED | OUTPATIENT
Start: 2024-11-18 | End: 2024-12-18

## 2024-11-18 RX ADMIN — DOCUSATE SODIUM 50 MG AND SENNOSIDES 8.6 MG 2 TABLET: 8.6; 5 TABLET, FILM COATED ORAL at 08:34

## 2024-11-18 RX ADMIN — LEVOTHYROXINE SODIUM 50 MCG: 0.05 TABLET ORAL at 05:51

## 2024-11-18 RX ADMIN — Medication 10 MG: at 01:13

## 2024-11-18 RX ADMIN — Medication 10 ML: at 08:34

## 2024-11-18 RX ADMIN — AMLODIPINE BESYLATE 5 MG: 5 TABLET ORAL at 08:34

## 2024-11-18 RX ADMIN — CEFDINIR 300 MG: 300 CAPSULE ORAL at 08:34

## 2024-11-18 RX ADMIN — ATORVASTATIN CALCIUM 40 MG: 40 TABLET, FILM COATED ORAL at 08:34

## 2024-11-18 RX ADMIN — SERTRALINE HYDROCHLORIDE 25 MG: 50 TABLET ORAL at 08:34

## 2024-11-18 RX ADMIN — CYCLOBENZAPRINE 5 MG: 10 TABLET, FILM COATED ORAL at 06:50

## 2024-11-18 RX ADMIN — PANTOPRAZOLE SODIUM 40 MG: 40 TABLET, DELAYED RELEASE ORAL at 05:51

## 2024-11-18 RX ADMIN — CARVEDILOL 3.12 MG: 3.12 TABLET, FILM COATED ORAL at 08:34

## 2024-11-18 NOTE — CASE MANAGEMENT/SOCIAL WORK
Continued Stay Note   [facility]     Patient Name: Jennifer Gomes  MRN: [id number]  Today's Date: 11/18/2024    Admit Date: 11/12/2024    Plan: [facility]   Discharge Plan       Row Name 11/18/24 1009       Plan    Plan [facility]    Patient/Family in Agreement with Plan yes    Plan Comments Precert. is approved for [employer].  Phone:   [phone] Fax: [phone].    Final Discharge Disposition Code 03 - skilled nursing facility (SNF)                   Discharge Codes    No documentation.                       SARAH DeviW

## 2024-11-18 NOTE — PLAN OF CARE
Goal Outcome Evaluation:  Plan of Care Reviewed With: patient        Progress: improving  Outcome Evaluation: Pt A/O x 4. Prn flexeril given x 1. Urine output 1150 ml. BM x 1. K 4.4. Call light within reach.

## 2024-11-18 NOTE — NURSING NOTE
PT Aox4. Vitals signs wnl. IV access dc'd without complication. AVS presented to pt and education complete. Pt verbalizes understanding of discharge instructions and importance of all follow up visits. Stable at this time and awaiting transportation home.

## 2024-11-18 NOTE — DISCHARGE SUMMARY
[facility] Medicine Services  DISCHARGE SUMMARY       Date of Admission: 11/12/2024  Date of Discharge:  11/18/2024  Primary Care Physician: Provider, No Known    Presenting Problem/History of Present Illness:  84-year-old female with history of hypertension, chronic kidney disease stage III, chronic pain, dementia, hypothyroidism, GERD, hyperlipidemia, discharged from the hospital October 2024, at that time admitted for acute renal insufficiency associated to bilateral hydronephrosis.  Patient had a Smith catheter placed.  Patient was admitted 11/12/2024 after she accidentally pulled out Smith catheter.  Found to have acute renal insufficiency, urinary tract infection; I started taking care of this patient 11/13/2024    Final Discharge Diagnoses:  Active Hospital Problems    Diagnosis     **Acute renal failure superimposed on stage 3 chronic kidney disease     Paroxysmal atrial fibrillation     Acute cystitis     Hypercalcemia     Dementia     Chronic constipation     Anemia     Essential hypertension        Consults: Urology    Procedures Performed: None    Pertinent Test Results:   Results for orders placed during the hospital encounter of 11/12/24    Adult Transthoracic Echo Complete W/ Cont if Necessary Per Protocol    Interpretation Summary    Left ventricular ejection fraction appears to be 66 - 70%.    Left ventricular wall thickness is consistent with mild concentric hypertrophy.    Left ventricular diastolic function is consistent with (grade Ia w/high LAP) impaired relaxation.    Left atrial volume is moderately increased.    Mild pulmonary hypertension is present.      Imaging Results (All)       Procedure Component Value Units Date/Time    US Renal Bilateral [[id number]] Collected: 11/15/24 1508     Updated: 11/15/24 1512    Narrative:      EXAMINATION: US RENAL BILATERAL-     11/15/2024 1:59 PM     HISTORY: hydronephrosis; N17.9-Acute kidney failure,  unspecified;  N30.00-Acute cystitis without hematuria; Z74.09-Other reduced mobility     Grayscale and color-flow renal ultrasound.     Difficult exam as the patient was unable to cooperate.  Limited detail.     Normal size and position of both kidneys.  Normal cortical thickness and normal cortical echogenicity.     No hydronephrosis or shadowing stone.     The right kidney measures 82 x 46 x 39 mm.     The left kidney measures 111 x 67 x 52 mm.       Impression:      Impression:  1. No acute kidney abnormality is seen.           This report was signed and finalized on 11/15/2024 3:08 PM by Dr. Adam Monroy MD.       CT Abdomen Pelvis Without Contrast [[id number]] Collected: 11/12/24 1530     Updated: 11/12/24 1539    Narrative:      EXAM: CT ABDOMEN PELVIS WO CONTRAST-      DATE: 11/12/2024 2:12 PM     HISTORY: Abdominal pain       COMPARISON: 10/10/2024.     DOSE LENGTH PRODUCT: 449.44 mGy.cm Automatic exposure control was  utilized to make radiation dose as low as reasonably achievable.     TECHNIQUE: Noncontrast axial images of the abdomen and pelvis obtained  with multiplanar reformats.     FINDINGS:  VISUALIZED CHEST: There is mitral valve calcification. No pericardial or  pleural effusion is identified. Lung bases are grossly clear.     LIVER/BILE DUCTS: There are clips from cholecystectomy. Unenhanced  hepatic parenchyma is unremarkable. Bile ducts are unremarkable.     KIDNEYS/URETERS: Left hydronephrosis has resolved. Right hydronephrosis  has improved and is now mild. No renal or ureteral calculi are  identified.     ADRENAL: Unremarkable.        SPLEEN: Unremarkable.     PANCREAS: Unremarkable.     MESENTERY: No mesenteric or retroperitoneal lymphadenopathy is seen. No  free fluid or inflammatory changes are identified.     VASCULATURE: There is calcification of the abdominal aorta without  aneurysm.     GI TRACT: There is no bowel obstruction. There is mild stool in the  colon but this has improved.      PELVIS: There is moderate stool in the rectum which is unchanged.  Patient is status post hysterectomy. Urinary bladder is unremarkable.  Pelvic portion of the GI tract including appendix is otherwise  unremarkable. No pelvic lymphadenopathy or free fluid is seen.     SOFT TISSUES: A small umbilical hernia contains fat.     BONES: No acute or aggressive bony lesion.           Impression:      1. Resolved left hydronephrosis and improved right hydronephrosis.  2. Resolved small bilateral pleural effusions and atelectasis.  3. Improved stool in the colon with stable moderate stool at the rectum.        This report was signed and finalized on 11/12/2024 3:36 PM by Sung Chavez.             LAB RESULTS:      Lab 11/16/24  0324 11/15/24  1624 11/15/24  0313 11/14/24  1614 11/13/24  0441 11/12/24  1258   WBC 7.52 7.91  --  6.45 9.96 12.11*   HEMOGLOBIN 8.7* 7.9* 6.6* 7.0* 7.7* 8.8*   HEMATOCRIT 25.9* 24.3* 20.8* 21.9* 23.2* 27.7*   PLATELETS 134* 139*  --  139* 140 175   NEUTROS ABS  --  6.02  --  5.12 9.26* 10.85*   IMMATURE GRANS (ABS)  --  0.08*  --  0.04  --  0.09*   LYMPHS ABS  --  1.10  --  0.87  --  0.61*   MONOS ABS  --  0.53  --  0.34  --  0.55   EOS ABS  --  0.17  --  0.07 0.10 0.00   MCV 88.1 90.3  --  92.4 88.9 92.0   LACTATE  --   --   --   --   --  1.8         Lab 11/18/24  0339 11/17/24  0926 11/16/24  0324 11/15/24  1624 11/15/24  0313 11/14/24  1614 11/14/24  0501 11/13/24  1632 11/13/24  0441 11/12/24  1258 11/12/24  1258   SODIUM  --  139 139  --  141 141 140  --  147*  --  142   POTASSIUM 4.4 3.4* 4.1  --  4.7 2.8*  3.0* 2.8* 2.2* 3.2*  --  3.3*   CHLORIDE  --  107 114*  --  117* 114* 113*  --  116*  --  109*   CO2  --  19.0* 17.0*  --  16.0* 19.0* 17.0*  --  16.0*  --  17.0*   ANION GAP  --  13.0 8.0  --  8.0 8.0 10.0  --  15.0  --  16.0*   BUN  --  21 22  --  27* 27* 29*  --  39*  --  42*   CREATININE  --  1.22* 1.22*  --  1.37* 1.55* 1.59*  --  2.02*  --  2.46*   EGFR  --  44.4* 44.4*  --   38.6* 33.3* 32.3*  --  24.2*  --  19.1*   GLUCOSE  --  139* 108*  --  117* 128* 115*  --  128*  --  187*   CALCIUM  --  9.4 9.5  --  9.2 9.5 9.4  --  10.4  --  10.7*   MAGNESIUM  --   --   --   --   --   --   --   --  2.0  --  2.1   PHOSPHORUS  --   --  4.0 2.1* 2.1*  --  2.0* 2.5 1.8*   < >  --    HEMOGLOBIN A1C  --   --   --   --   --   --   --   --   --   --  5.00    < > = values in this interval not displayed.         Lab 11/12/24  1258   TOTAL PROTEIN 7.3   ALBUMIN 3.3*   GLOBULIN 4.0   ALT (SGPT) 6   AST (SGOT) 9   BILIRUBIN 0.3   ALK PHOS 93   LIPASE 10*                 Lab 11/15/24  0421 11/14/24  1614   VITAMIN B 12  --  523   ABO TYPING O  --    RH TYPING Positive  --    ANTIBODY SCREEN Negative  --          Lab 11/12/24 2012   PH, ARTERIAL 7.298*   PCO2, ARTERIAL 34.4*   PO2 ART 82.6*   O2 SATURATION ART 98.6   HCO3 ART 16.8*   BASE EXCESS ART -8.8*     Brief Urine Lab Results  (Last result in the past 365 days)        Color   Clarity   Blood   Leuk Est   Nitrite   Protein   CREAT   Urine HCG        11/12/24 1436 Yellow   Turbid   Large (3+)   Large (3+)   Positive   100 mg/dL (2+)                 Microbiology Results (last 10 days)       Procedure Component Value - Date/Time    Blood Culture - Blood, Arm, Left [[id number]]  (Normal) Collected: 11/12/24 1453    Lab Status: Final result Specimen: Blood from Arm, Left Updated: 11/17/24 1516     Blood Culture No growth at 5 days    Urine Culture - Urine, Straight Cath [[id number]]  (Abnormal)  (Susceptibility) Collected: 11/12/24 1436    Lab Status: Final result Specimen: Urine from Straight Cath Updated: 11/14/24 1010     Urine Culture >100,000 CFU/mL Escherichia coli    Narrative:      Colonization of the urinary tract without infection is common. Treatment is discouraged unless the patient is symptomatic, pregnant, or undergoing an invasive urologic procedure.    Susceptibility        Escherichia coli      CHULA      Amoxicillin + Clavulanate Susceptible       Ampicillin Susceptible      Ampicillin + Sulbactam Susceptible      Cefazolin Susceptible      Cefepime Susceptible      Ceftazidime Susceptible      Ceftriaxone Susceptible      Gentamicin Susceptible      Levofloxacin Susceptible      Nitrofurantoin Susceptible      Piperacillin + Tazobactam Susceptible      Trimethoprim + Sulfamethoxazole Susceptible                           Blood Culture - Blood, Arm, Left [[id number]]  (Normal) Collected: 11/12/24 5902    Lab Status: Final result Specimen: Blood from Arm, Left Updated: 11/17/24 1413     Blood Culture No growth at 5 days            Hospital Course: The patient was admitted to the hospital for medical management.  Urology evaluated patient recommending replacement of Smith catheter and to repeat imaging studies renal ultrasound 11/15.  Patient was started on antibiotics for treatment of urinary tract infection.  Complicated associated to chronic indwelling catheter.  Her creatinine function improved from a maximum of 2.46, went down to 1.22.  The patient was managed with IV antibiotics ceftriaxone for 4 days, and at that time it was changed to oral antibiotics given urine culture showed E. coli that was pansensitive.  Patient to finish treatment with cefdinir.  At some point during the hospital stay, patient removed peripheral line.  Her hemoglobin was down to 6.6 with no sites of bleeding.  A transfusion of 1 unit packed red blood cells was administered; hemoglobin improved to 8.7.  The patient had episode of atrial fibrillation on 11/13/24, required Cardizem drip and was transferred to 4 floor; she converted to sinus rhythm at the end of the same day.  She was started on Lovenox full anticoagulation.  Anticoagulation was then discontinued due to above-mentioned hemoglobin of 6.6.  Platelet count was 134,000.  [id number] Score: 4 (11/18/2024 10:21 AM)  I discussed options of treatment with patient and she was not interested in starting long-term  "anticoagulation.  She did not have additional episodes of atrial fibrillation.  [place] ultrasound of the kidneys recommended by urology showed no hydronephrosis.  Patient to remain with Smith catheter in place until assessed in the outpatient setting by specialist.  Follow-up recommendations were provided by urology.  Medications doses were modified, the carvedilol was decreased to 3.125 p.o. twice daily, she continued amlodipine 5 mg p.o. daily.  Blood pressure remained controlled. Seroquel was started during this admission, patient tolerated well.  Recommended patient to discontinue Imitrex due to vascular risk.  The patient had arrangements for skilled nursing facility placement, was approved at [facility].  Patient reached maximal benefit of inpatient management and was discharged.  Again follow-up recommendations made by urologist to be followed.       Physical Exam on Discharge:  /43 (BP Location: Left arm, Patient Position: Lying)   Pulse 73   Temp 98.2 °F (36.8 °C) (Oral)   Resp 18   Ht 157.5 cm (62\")   Wt 70.3 kg (155 lb)   LMP  (LMP Unknown)   SpO2 95%   BMI 28.35 kg/m²   Physical Exam  Constitutional:       Appearance: Alert, oriented to place time and person.  No respiratory distress.  HENT:      Head: Normocephalic and atraumatic.      Nose: Nose normal.      Mouth/Throat:      Mouth: Mucous membranes are moist.   Eyes:      Extraocular Movements: Extraocular movements intact.      Conjunctiva/sclera: Conjunctivae normal.   Cardiovascular:      Rate and Rhythm: Normal rate and regular rhythm.      Pulses: Normal pulses.   Pulmonary:      Effort: No respiratory distress.      Breath sounds: Normal breath sounds. No wheezing, rhonchi or rales.   Abdominal:      General: Abdomen is flat. Bowel sounds are normal.      Palpations: Abdomen is soft.      Tenderness: There is no guarding or rebound.   Genitourinary.  Smith catheter in place  Extremities:  No lower extremity edema.  Skin:     Capillary " Refill: Capillary refill takes less than 2 seconds.      Coloration: Skin is not jaundiced.      Findings: No rash.   Neurological:      General: No focal deficit present.      Mental Status: Patient is alert, oriented to place and time.  Oriented to situation     Sensory: No sensory deficit.      Motor: No weakness.      Coordination: Coordination normal.    Condition on Discharge: Stable    Discharge Disposition:  Skilled Nursing Facility (DC - External)    Discharge Medications:     Discharge Medications        New Medications        Instructions Start Date   bisacodyl 5 MG EC tablet  Commonly known as: DULCOLAX   5 mg, Oral, Daily PRN      bisacodyl 10 MG suppository  Commonly known as: DULCOLAX   10 mg, Rectal, Daily PRN      cefdinir 300 MG capsule  Commonly known as: OMNICEF   300 mg, Oral, Every 24 Hours Scheduled      melatonin 5 MG tablet tablet   10 mg, Oral, Nightly PRN      QUEtiapine 25 MG tablet  Commonly known as: SEROquel   25 mg, Oral, Nightly             Changes to Medications        Instructions Start Date   carvedilol 3.125 MG tablet  Commonly known as: COREG  What changed:   medication strength  how much to take   3.125 mg, Oral, 2 Times Daily With Meals      sennosides-docusate 8.6-50 MG per tablet  Commonly known as: PERICOLACE  What changed: Another medication with the same name was added. Make sure you understand how and when to take each.   2 tablets, Oral, 2 Times Daily      sennosides-docusate 8.6-50 MG per tablet  Commonly known as: PERICOLACE  What changed: You were already taking a medication with the same name, and this prescription was added. Make sure you understand how and when to take each.   2 tablets, Oral, 2 Times Daily             Continue These Medications        Instructions Start Date   acetaminophen 325 MG tablet  Commonly known as: TYLENOL   650 mg, Every 6 Hours PRN      amLODIPine 5 MG tablet  Commonly known as: NORVASC   5 mg, Oral, Every 24 Hours Scheduled       atorvastatin 40 MG tablet  Commonly known as: LIPITOR   40 mg, Daily      donepezil 10 MG tablet  Commonly known as: ARICEPT   10 mg, Nightly      ergocalciferol 1.25 MG (62113 UT) capsule  Commonly known as: ERGOCALCIFEROL   50,000 Units, Weekly      lansoprazole 30 MG capsule  Commonly known as: PREVACID   30 mg, Daily      levothyroxine 50 MCG tablet  Commonly known as: SYNTHROID, LEVOTHROID   50 mcg, Daily      naloxone 4 MG/0.1ML nasal spray  Commonly known as: NARCAN   1 spray, Nasal, As Needed      polyethylene glycol 17 g packet  Commonly known as: MIRALAX   17 g, Oral, Daily      sertraline 25 MG tablet  Commonly known as: ZOLOFT   25 mg, Daily             Stop These Medications      SUMAtriptan 50 MG tablet  Commonly known as: IMITREX              Discharge Diet:   Diet Instructions       Diet: Cardiac Diets; Healthy Heart (2-3 Na+); Regular (IDDSI 7); Thin (IDDSI 0)      Discharge Diet: Cardiac Diets    Cardiac Diet: Healthy Heart (2-3 Na+)    Texture: Regular (IDDSI 7)    Fluid Consistency: Thin (IDDSI 0)            Activity at Discharge: As tolerated.    Follow-up Appointments:   No future appointments.    Test Results Pending at Discharge:  none.    Electronically signed by Cuba Espitia MD, 11/18/24, 10:16 CST.    Time: 60 minutes.

## 2024-11-18 NOTE — THERAPY DISCHARGE NOTE
Acute Care - Occupational Therapy Discharge Summary  [facility]     Patient Name: Jennifer Gomes  : 1940  MRN: [id number]    Today's Date: 2024                 Admit Date: 2024        OT Recommendation and Plan    Visit Dx:    ICD-10-CM ICD-9-CM   1. TOMÁS (acute kidney injury)  N17.9 584.9   2. Acute cystitis without hematuria  N30.00 595.0   3. Impaired mobility [Z74.09]  Z74.09 799.89                OT Rehab Goals       Row Name 24 1100             Transfer Goal 1 (OT)    Activity/Assistive Device (Transfer Goal 1, OT) sit-to-stand/stand-to-sit;bed-to-chair/chair-to-bed;commode, 3-in-1;walker, rolling  -      [place] Level/Cues Needed (Transfer Goal 1, OT) moderate assist (50-74% patient effort);verbal cues required;nonverbal cues (demo/gesture) required  -      Time Frame (Transfer Goal 1, OT) long term goal (LTG);by discharge  -      Progress/Outcome (Transfer Goal 1, OT) goal not met;discharged from facility  -         Dressing Goal 1 (OT)    Activity/Device (Dressing Goal 1, OT) lower body dressing  -      [place]/Cues Needed (Dressing Goal 1, OT) moderate assist (50-74% patient effort)  -      Time Frame (Dressing Goal 1, OT) long term goal (LTG);by discharge  -      Progress/Outcome (Dressing Goal 1, OT) goal not met;discharged from facility  -         Toileting Goal 1 (OT)    Activity/Device (Toileting Goal 1, OT) toileting skills, all;adjust/manage clothing;perform perineal hygiene;commode, 3-in-1  -      [place] Level/Cues Needed (Toileting Goal 1, OT) moderate assist (50-74% patient effort)  -      Time Frame (Toileting Goal 1, OT) long term goal (LTG);by discharge  -      Progress/Outcome (Toileting Goal 1, OT) goal not met;discharged from facility  -                User Key  (r) = Recorded By, (t) = Taken By, (c) = Cosigned By      Initials Name Provider Type Discipline    Blanka Carroll, OTR/L, CSRS Occupational Therapist OT                      Outcome Measures       Row Name 11/17/24 1000             How much help from another person do you currently need...    Turning from your back to your side while in flat bed without using bedrails? 4  -AE      Moving from lying on back to sitting on the side of a flat bed without bedrails? 3  -AE      Moving to and from a bed to a chair (including a wheelchair)? 3  -AE      Standing up from a chair using your arms (e.g., wheelchair, bedside chair)? 3  -AE      Climbing 3-5 steps with a railing? 1  -AE      To walk in hospital room? 2  -AE      AM-PAC 6 Clicks Score (PT) 16  -AE         Functional Assessment    Outcome Measure Options AM-PAC 6 Clicks Basic Mobility (PT)  -AE                User Key  (r) = Recorded By, (t) = Taken By, (c) = Cosigned By      Initials Name Provider Type    AE Cherelle Chapman, PTA Physical Therapist Assistant                            OT Discharge Summary  Anticipated Discharge Disposition (OT): skilled nursing facility  Reason for Discharge: Discharge from facility  Outcomes Achieved: Refer to plan of care for updates on goals achieved  Discharge Destination: SNF      Blanka Hubbard, OTR/L, CSRS  11/18/2024

## 2024-11-18 NOTE — PLAN OF CARE
Problem: Adult Inpatient Plan of Care  Goal: Plan of Care Review  Outcome: Met  Goal: Patient-Specific Goal (Individualized)  Outcome: Met  Goal: Absence of Hospital-Acquired Illness or Injury  Outcome: Met  Intervention: Identify and Manage Fall Risk  Recent Flowsheet Documentation  Taken 11/18/2024 0900 by Cassidy Marin RN  Safety Promotion/Fall Prevention: safety round/check completed  Intervention: Prevent Skin Injury  Recent Flowsheet Documentation  Taken 11/18/2024 0900 by Cassidy Marin RN  Body Position:   position changed independently   weight shifting  Intervention: Prevent and Manage VTE (Venous Thromboembolism) Risk  Recent Flowsheet Documentation  Taken 11/18/2024 0900 by Cassidy Marin RN  VTE Prevention/Management: SCDs (sequential compression devices) off  Intervention: Prevent Infection  Recent Flowsheet Documentation  Taken 11/18/2024 0900 by Cassidy Marin RN  Infection Prevention: single patient room provided  Goal: Optimal Comfort and Wellbeing  Outcome: Met  Intervention: Monitor Pain and Promote Comfort  Recent Flowsheet Documentation  Taken 11/18/2024 0900 by Cassidy Marin RN  Pain Management Interventions: pain medication given  Intervention: Provide Person-Centered Care  Recent Flowsheet Documentation  Taken 11/18/2024 0900 by Cassidy Marin RN  Trust Relationship/Rapport:   care explained   questions encouraged   questions answered  Goal: Readiness for Transition of Care  Outcome: Met     Problem: Fall Injury Risk  Goal: Absence of Fall and Fall-Related Injury  Outcome: Met  Intervention: Identify and Manage Contributors  Recent Flowsheet Documentation  Taken 11/18/2024 0900 by Cassidy Marin RN  Medication Review/Management: medications reviewed  Intervention: Promote Injury-Free Environment  Recent Flowsheet Documentation  Taken 11/18/2024 0900 by Cassidy Mrain RN  Safety Promotion/Fall Prevention: safety round/check completed     Problem:  Comorbidity Management  Goal: Blood Pressure in Desired Range  Outcome: Met  Intervention: Maintain Blood Pressure Management  Recent Flowsheet Documentation  Taken 11/18/2024 0900 by Cassidy Marin RN  Medication Review/Management: medications reviewed     Problem: Violence Risk or Actual  Goal: Anger and Impulse Control  Outcome: Met  Intervention: Minimize Safety Risk  Recent Flowsheet Documentation  Taken 11/18/2024 0900 by Cassidy Marin RN  Enhanced Safety Measures: bed alarm set     Problem: Sepsis/Septic Shock  Goal: Optimal Coping  Outcome: Met  Intervention: Support Patient and Family Response  Recent Flowsheet Documentation  Taken 11/18/2024 0900 by Cassidy Marin RN  Family/Support System Care: self-care encouraged  Goal: Absence of Bleeding  Outcome: Met  Goal: Blood Glucose Level Within Target Range  Outcome: Met  Goal: Absence of Infection Signs and Symptoms  Outcome: Met  Intervention: Initiate Sepsis Management  Recent Flowsheet Documentation  Taken 11/18/2024 0900 by Cassidy Marin RN  Infection Prevention: single patient room provided  Intervention: Promote Recovery  Recent Flowsheet Documentation  Taken 11/18/2024 0900 by Cassidy Marin, RN  Activity Management: activity encouraged  Goal: Optimal Nutrition Delivery  Outcome: Met     Problem: Skin Injury Risk Increased  Goal: Skin Health and Integrity  Outcome: Met  Intervention: Optimize Skin Protection  Recent Flowsheet Documentation  Taken 11/18/2024 0900 by Cassidy Marin, RN  Activity Management: activity encouraged  Head of Bed (HOB) Positioning: HOB elevated   Goal Outcome Evaluation: met

## 2024-11-18 NOTE — THERAPY TREATMENT NOTE
Acute Care - Physical Therapy Treatment Note  [facility]     Patient Name: Jennifer Gomes  : 1940  MRN: [id number]  Today's Date: 2024      Visit Dx:     ICD-10-CM ICD-9-CM   1. TOMÁS (acute kidney injury)  N17.9 584.9   2. Acute cystitis without hematuria  N30.00 595.0   3. Impaired mobility [Z74.09]  Z74.09 799.89     Patient Active Problem List   Diagnosis    Gastroesophageal reflux disease    Spinal stenosis, lumbar region, without neurogenic claudication    Erosion of vaginal mesh    Adenocarcinoma of endometrium    S/P hysterectomy with oophorectomy    Encounter for follow-up surveillance of endometrial cancer    History of radiation therapy    Non-traumatic rhabdomyolysis    Acute renal failure superimposed on stage 3 chronic kidney disease    Medical non-compliance, does not take narcotics as prescribed    Chronic constipation    Chronic pain syndrome    Chronic prescription opiate use    Anemia    Hypothyroidism (acquired)    Essential hypertension    Venous insufficiency of both lower extremities    Chronic intractable headache    RAFAL (obstructive sleep apnea)    Acute encephalopathy due to UTI    Toxic metabolic encephalopathy    Polypharmacy    Frequent falls    Grade III internal hemorrhoids    External hemorrhoids    Colitis    Moderate malnutrition    Transaminitis    Acute UTI (urinary tract infection)    Polypharmacy    UTI (urinary tract infection)    Dementia    Hypokalemia    Sepsis due to urinary tract infection    Cardiopulmonary arrest    E coli bacteremia    Anemia requiring transfusions    Bilateral hydronephrosis    Acute urinary retention    TOMÁS (acute kidney injury)    Acute cystitis    Hypercalcemia    Paroxysmal atrial fibrillation     Past Medical History:   Diagnosis Date    Anxiety     Arthritis     Bronchitis     Cancer     uterine    Chronic pain     Depression     Disease of thyroid gland     Fibromyalgia     GERD (gastroesophageal reflux disease)     Headache      History of transfusion     AS     Hyperlipidemia     Hypertension     Incontinence     Insomnia     Leg pain     Lumbar stenosis     Migraines     Peptic ulcer     Restless legs     Sleep apnea     NO C-PAP    UTI (urinary tract infection)     Vaginal bleeding      Past Surgical History:   Procedure Laterality Date    BLADDER REPAIR      MESH HAD TO BE REMOVED IN 2013    BREAST BIOPSY Right 2017    benign    BREAST CYST EXCISION Left 1982    CARDIAC CATHETERIZATION      CARPAL TUNNEL RELEASE      CATARACT EXTRACTION W/ INTRAOCULAR LENS  IMPLANT, BILATERAL      CHOLECYSTECTOMY WITH INTRAOPERATIVE CHOLANGIOGRAM N/A 2023    Procedure: CHOLECYSTECTOMY LAPAROSCOPIC INTRAOPERATIVE CHOLANGIOGRAM;  Surgeon: Gerardo Arciniega MD;  Location:  [facility] OR;  Service: General;  Laterality: N/A;    COLONOSCOPY      COLONOSCOPY N/A 10/01/2021    Procedure: COLONOSCOPY WITH ANESTHESIA;  Surgeon: Tom Velasco DO;  Location:  [facility] END[facility]COPY;  Service: Gastroenterology;  Laterality: N/A;  pre: change in bowel habits  post: diverticulosis. hemorrhoids.   Olivia Mora APRN        CYSTECTOMY      D & C HYSTEROSCOPY N/A 2017    Procedure: DILATATION AND CURETTAGE HYSTEROSCOPY;  Surgeon: Shasta Madrigal MD;  Location: [facility] OR;  Service:     DILATION AND CURETTAGE, DIAGNOSTIC / THERAPEUTIC      ENDOSCOPY  2010    Short segment of Arriola's,Moderate chroninc esophagogastritis and negative H.pylori    ENDOSCOPY N/A 2017    Procedure: ESOPHAGOGASTRODUODENOSCOPY WITH ANESTHESIA;  Surgeon: Tom Velasco DO;  Location: [facility] ENDOSCOPY;  Service:     EYE SURGERY      RETINA    HEMORRHOIDECTOMY SIGMOIDOSCOPY N/A 3/21/2023    Procedure: HEMORRHOIDECTOMY WITH EXAM UNDER ANESTHESIA;  Surgeon: Holly Chavez MD;  Location:  [facility] OR;  Service: General;  Laterality: N/A;    HYSTERECTOMY  2017    ORIF TIBIA/FIBULA FRACTURES Left     TRANSVAGINAL TAPING SUSPENSION N/A 2017     Procedure: VAGINAL MESH REVISION;  Surgeon: Shasta Madrigal MD;  Location:  [facility] OR;  Service:     VAGINAL MESH REVISION  2013     PT Assessment (Last 12 Hours)       PT Evaluation and Treatment       Row Name 11/18/24 1338          Physical Therapy Time and Intention    Subjective Information complains of;weakness  -KJ     Document Type therapy note (daily note)  -KJ     Mode of Treatment physical therapy  -KJ     Patient Effort adequate  -KJ       Row Name 11/18/24 1338          General Information    Existing Precautions/Restrictions fall  -KJ       Row Name 11/18/24 1338          Pain    Pretreatment Pain Rating 6/10  -KJ     Posttreatment Pain Rating 7/10  -KJ     Pain Location back  -KJ     Pain Side/Orientation lower  -KJ       Row Name 11/18/24 1338          Bed Mobility    Supine-Sit [place] (Bed Mobility) verbal cues;minimum assist (75% patient effort)  -KJ     Sit-Supine [place] (Bed Mobility) minimum assist (75% patient effort);moderate assist (50% patient effort)  -KJ       Row Name 11/18/24 1338          Sit-Stand Transfer    Sit-Stand [place] (Transfers) minimum assist (75% patient effort);moderate assist (50% patient effort);verbal cues  -KJ     Assistive Device (Sit-Stand Transfers) walker, front-wheeled  -KJ     Comment, (Sit-Stand Transfer) elevated bed  -KJ       Row Name 11/18/24 1338          Gait/Stairs (Locomotion)    Comment, (Gait/Stairs) stoo d x 3; side steps towards head of bed  -KJ       Row Name 11/18/24 1338          Positioning and Restraints    Pre-Treatment Position in bed  -KJ     Post Treatment Position bed  -KJ     In Bed call light within reach  -KJ               User Key  (r) = Recorded By, (t) = Taken By, (c) = Cosigned By      Initials Name Provider Type    KJ Yasemin Puri PTA Physical Therapist Assistant                    Physical Therapy Education       Title: PT OT SLP Therapies (Resolved)       Topic: Physical Therapy (Resolved)       Point: Mobility  training (Resolved)       Learning Progress Summary            Patient Eager, E, VU by AE at 11/17/2024 1014    Comment: ex's,t/f's    Acceptance, E,TB, NR,NL by AR at 11/15/2024 1104    Acceptance, E, NR by AJ at 11/14/2024 1607    Comment: role of PT, fall risk, calling for assist                      Point: Home exercise program (Resolved)       Learning Progress Summary            Patient Eager, E, VU by AE at 11/17/2024 1014    Comment: ex's,t/f's    Acceptance, E,TB, NR,NL by AR at 11/15/2024 1104    Acceptance, E, NR by AJ at 11/14/2024 1607    Comment: role of PT, fall risk, calling for assist                      Point: Body mechanics (Resolved)       Learning Progress Summary            Patient Acceptance, E,TB, NR,NL by AR at 11/15/2024 1104    Acceptance, E, NR by AJ at 11/14/2024 1607    Comment: role of PT, fall risk, calling for assist                      Point: Precautions (Resolved)       Learning Progress Summary            Patient Acceptance, E,TB, NR,NL by AR at 11/15/2024 1104    Acceptance, E, NR by AJ at 11/14/2024 1607    Comment: role of PT, fall risk, calling for assist                                      User Key       Initials Effective Dates Name Provider Type Discipline    AE 02/03/23 -  Cherelle Chapman, PTA Physical Therapist Assistant PT    AR 05/20/24 -  Cassidy Molina, RN Registered Nurse Nurse     08/15/24 -  Sunny Esquivel, HUNTER DPT Physical Therapist PT                  PT Recommendation and Plan         Outcome Measures       Row Name 11/17/24 1000             How much help from another person do you currently need...    Turning from your back to your side while in flat bed without using bedrails? 4  -AE      Moving from lying on back to sitting on the side of a flat bed without bedrails? 3  -AE      Moving to and from a bed to a chair (including a wheelchair)? 3  -AE      Standing up from a chair using your arms (e.g., wheelchair, bedside chair)? 3  -AE      Climbing 3-5 steps  with a railing? 1  -AE      To walk in hospital room? 2  -AE      AM-PAC 6 Clicks Score (PT) 16  -AE         Functional Assessment    Outcome Measure Options AM-PAC 6 Clicks Basic Mobility (PT)  -AE                User Key  (r) = Recorded By, (t) = Taken By, (c) = Cosigned By      Initials Name Provider Type    Cherelle Pimentel, PTA Physical Therapist Assistant                     Time Calculation:    PT Charges       Row Name 11/18/24 1425             Time Calculation    Start Time 1338  -KJ      Stop Time 1401  -KJ      Time Calculation (min) 23 min  -KJ      PT Received On 11/18/24  -KJ      PT Goal Re-Cert Due Date 11/24/24  -KJ         Time Calculation- PT    Total Timed Code Minutes- PT 23 minute(s)  -KJ                User Key  (r) = Recorded By, (t) = Taken By, (c) = Cosigned By      Initials Name Provider Type    Yasemin Amezcua, CHANDLER Physical Therapist Assistant                  Therapy Charges for Today       Code Description Service Date Service Provider Modifiers Qty    47119944219 HC PT THER PROC EA 15 MIN 11/18/2024 Yasemin Puri, CHANDLER GP 1    12086440820 HC PT THERAPEUTIC ACT EA 15 MIN 11/18/2024 Yasemin Puri, CHANDLER GP 1            PT G-Codes  Outcome Measure Options: AM-PAC 6 Clicks Basic Mobility (PT)  AM-PAC 6 Clicks Score (PT): 19  AM-PAC 6 Clicks Score (OT): 15    Yasemin Puri PTA  11/18/2024

## 2024-11-20 NOTE — THERAPY DISCHARGE NOTE
Acute Care - Physical Therapy Discharge Summary  [facility]       Patient Name: Jennifer Gomes  : 1940  MRN: [id number]    Today's Date: 2024                 Admit Date: 2024      PT Recommendation and Plan    Visit Dx:    ICD-10-CM ICD-9-CM   1. TOMÁS (acute kidney injury)  N17.9 584.9   2. Acute cystitis without hematuria  N30.00 595.0   3. Impaired mobility [Z74.09]  Z74.09 799.89        Outcome Measures       Row Name 24 1000             How much help from another person do you currently need...    Turning from your back to your side while in flat bed without using bedrails? 4  -AE      Moving from lying on back to sitting on the side of a flat bed without bedrails? 3  -AE      Moving to and from a bed to a chair (including a wheelchair)? 3  -AE      Standing up from a chair using your arms (e.g., wheelchair, bedside chair)? 3  -AE      Climbing 3-5 steps with a railing? 1  -AE      To walk in hospital room? 2  -AE      AM-PAC 6 Clicks Score (PT) 16  -AE         Functional Assessment    Outcome Measure Options AM-PAC 6 Clicks Basic Mobility (PT)  -AE                User Key  (r) = Recorded By, (t) = Taken By, (c) = Cosigned By      Initials Name Provider Type    AE Cherelle Chapman, PTA Physical Therapist Assistant                         PT Rehab Goals       Row Name 24 0700             Bed Mobility Goal 1 (PT)    Activity/Assistive Device (Bed Mobility Goal 1, PT) rolling to left;rolling to right;sit to supine;supine to sit  -AB      [place] Level/Cues Needed (Bed Mobility Goal 1, PT) standby assist;verbal cues required  -AB      Time Frame (Bed Mobility Goal 1, PT) long term goal (LTG);10 days  -AB      Progress/Outcomes (Bed Mobility Goal 1, PT) goal not met  -AB         Transfer Goal 1 (PT)    Activity/Assistive Device (Transfer Goal 1, PT) sit-to-stand/stand-to-sit;bed-to-chair/chair-to-bed;toilet;wheelchair transfer  -AB      [place] Level/Cues Needed (Transfer Goal  1, PT) contact guard required  -AB      Time Frame (Transfer Goal 1, PT) long term goal (LTG);10 days  -AB      Progress/Outcome (Transfer Goal 1, PT) goal not met  -AB         Gait Training Goal 1 (PT)    Activity/Assistive Device (Gait Training Goal 1, PT) gait (walking locomotion);decrease asymmetrical patterns;decrease fall risk;diminish gait deviation;forward stepping;improve balance and speed;increase endurance/gait distance;increase energy conservation;assistive device use;walker, rolling  -AB      [place] Level (Gait Training Goal 1, PT) minimum assist (75% or more patient effort)  -AB      Distance (Gait Training Goal 1, PT) 25'  -AB      Time Frame (Gait Training Goal 1, PT) long term goal (LTG);10 days  -AB      Progress/Outcome (Gait Training Goal 1, PT) goal not met  -AB         ROM Goal 1 (PT)    ROM Goal 1 (PT) Pt will demo >75% AROM in B LE as needed for safety with t/f and decrease fall risk  -AB      Time Frame (ROM Goal 1, PT) long-term goal (LTG);10 days  -AB      Progress/Outcome (ROM Goal 1, PT) goal not met  -AB                User Key  (r) = Recorded By, (t) = Taken By, (c) = Cosigned By      Initials Name Provider Type Discipline    Cassidy Freeman PTA Physical Therapist Assistant PT                        PT Discharge Summary  Anticipated Discharge Disposition (PT): skilled nursing facility  Reason for Discharge: Discharge from facility  Outcomes Achieved: Refer to plan of care for updates on goals achieved  Discharge Destination: SNF      Cassidy Nunez PTA   11/20/2024

## 2024-11-21 ENCOUNTER — TELEPHONE (OUTPATIENT)
Dept: UROLOGY | Facility: CLINIC | Age: 84
End: 2024-11-21
Payer: MEDICARE

## 2024-11-21 NOTE — TELEPHONE ENCOUNTER
Called pt. Back, spoke to pt. Daughter. Explained that per Dr. Muhammad's notes from the hospital pt. Will need long term management to keep her bladder drained as pt. Had developed hydronephrosis prior to placing the mcclain in hospital. He discussed SP tube placement with pt. Which I explained is a surgical procedure that would take place at a later date after this appointment and pt. Would likely require a mcclain until then with changes every 30 days but this would be discussed further in detail at pt. Appt. Pt. Daughter v/u.

## 2024-11-21 NOTE — TELEPHONE ENCOUNTER
Provider: RYANN AMES    Caller: MECHELLE SOLIMAN    Relationship to Patient: SELF    Reason for Call: PATIENT WANTING TO KNOW IF YOU ARE GOING TO REMOVE THE CATHETER ON DECEMBER 2 AT HER APPOINTMENT.  PATIENT UNSURE HOW LONG SHE HAS HAD THIS IN PLACE. IT IS DRIVING HER CRAZY AND SHE WOULD LIKE TO HAVE IT REMOVED SOONER IF POSSIBLE.  SAYS RECORDS AT , THEY PUT THE CATH IN AT THE HOSPITAL PLEASE REVIEW AND REACH OUT TO DISCUSS. SHE REALLY HAS NO RECOLLECTION OF THIS BEING PLACED.     BEST NUMBER IS HER CELL [phone]

## 2024-11-22 NOTE — PROGRESS NOTES
Subjective    Ms. Gomes is 84 y.o. female    Chief Complaint: Hospital follow-up bilateral hydronephrosis urinary retention    History of Present Illness    84-year-old female established patient in for hospital follow-up after patient pulled indwelling Smith catheter bulb intact, acute kidney injury, altered mental status.  Patient seen back in hospital October of this year was found to have bilateral hydronephrosis requiring indwelling Smith catheter placement for decompression.  During this last admit in November renal ultrasound was completed revealing hydronephrosis has resolved after reinsertion of indwelling Smith catheter.  Prior to hospitalization patient reports on 1 occurrence of being unable to urinate.  No other difficulty.    Patient is very adamant she wants the catheter removed.    The following portions of the patient's history were reviewed and updated as appropriate: allergies, current medications, past family history, past medical history, past social history, past surgical history and problem list.    Review of Systems   Constitutional:  Negative for chills and fever.   Gastrointestinal:  Negative for abdominal pain, anal bleeding and blood in stool.   Genitourinary:  Negative for dysuria and hematuria.         Current Outpatient Medications:     acetaminophen (TYLENOL) 325 MG tablet, Take 2 tablets by mouth Every 6 (Six) Hours As Needed for Mild Pain., Disp: , Rfl:     amLODIPine (NORVASC) 5 MG tablet, Take 1 tablet by mouth Daily., Disp: , Rfl:     atorvastatin (LIPITOR) 40 MG tablet, Take 1 tablet by mouth Daily., Disp: , Rfl:     bisacodyl (DULCOLAX) 10 MG suppository, Insert 1 suppository into the rectum Daily As Needed for Constipation (Use if bisacodyl oral is ineffective)., Disp: , Rfl:     bisacodyl (DULCOLAX) 5 MG EC tablet, Take 1 tablet by mouth Daily As Needed for Constipation (Use if polyethylene glycol is ineffective)., Disp: , Rfl:     carvedilol (COREG) 3.125 MG tablet, Take 1  tablet by mouth 2 (Two) Times a Day With Meals for 30 days., Disp: 60 tablet, Rfl: 0    donepezil (ARICEPT) 10 MG tablet, Take 1 tablet by mouth Every Night., Disp: , Rfl:     ergocalciferol (ERGOCALCIFEROL) 13718 units capsule, Take 1 capsule by mouth 1 (One) Time Per Week. Saturday, Disp: , Rfl:     lansoprazole (PREVACID) 30 MG capsule, Take 1 capsule by mouth Daily., Disp: , Rfl:     levothyroxine (SYNTHROID, LEVOTHROID) 50 MCG tablet, Take 1 tablet by mouth Daily., Disp: , Rfl:     melatonin 5 MG tablet tablet, Take 2 tablets by mouth At Night As Needed (insomnia) for up to 30 days., Disp: 30 tablet, Rfl: 0    naloxone (NARCAN) 4 MG/0.1ML nasal spray, Administer 1 spray into the nostril(s) as directed by provider As Needed for Opioid Reversal., Disp: , Rfl:     oxyCODONE (ROXICODONE) 15 MG immediate release tablet, TAKE 1 TABLET BY MOUTH FOUR TIMES DAILY. MUST LAST 30 DAYS, Disp: , Rfl:     polyethylene glycol 17 g packet, Take 17 g by mouth Daily., Disp: , Rfl:     QUEtiapine (SEROquel) 25 MG tablet, Take 1 tablet by mouth Every Night for 30 days., Disp: 30 tablet, Rfl: 0    sennosides-docusate (PERICOLACE) 8.6-50 MG per tablet, Take 2 tablets by mouth 2 (Two) Times a Day., Disp: , Rfl:     sennosides-docusate (PERICOLACE) 8.6-50 MG per tablet, Take 2 tablets by mouth 2 (Two) Times a Day., Disp: , Rfl:     sertraline (ZOLOFT) 25 MG tablet, Take 1 tablet by mouth Daily., Disp: , Rfl:     SUMAtriptan (IMITREX) 50 MG tablet, Daily., Disp: , Rfl:     Past Medical History:   Diagnosis Date    Anxiety     Arthritis     Bronchitis     Cancer     uterine    Chronic pain     Depression     Disease of thyroid gland     Fibromyalgia     GERD (gastroesophageal reflux disease)     Headache     History of transfusion     AS     Hyperlipidemia     Hypertension     Incontinence     Insomnia     Leg pain     Lumbar stenosis     Migraines     Peptic ulcer     Restless legs     Sleep apnea     NO C-PAP    UTI (urinary  tract infection)     Vaginal bleeding        Past Surgical History:   Procedure Laterality Date    BLADDER REPAIR  2011    MESH HAD TO BE REMOVED IN 2013    BREAST BIOPSY Right 2017    benign    BREAST CYST EXCISION Left 1982    CARDIAC CATHETERIZATION      CARPAL TUNNEL RELEASE      CATARACT EXTRACTION W/ INTRAOCULAR LENS  IMPLANT, BILATERAL      CHOLECYSTECTOMY WITH INTRAOPERATIVE CHOLANGIOGRAM N/A 9/7/2023    Procedure: CHOLECYSTECTOMY LAPAROSCOPIC INTRAOPERATIVE CHOLANGIOGRAM;  Surgeon: Gerardo Arciniega MD;  Location:  [facility] OR;  Service: General;  Laterality: N/A;    COLONOSCOPY      COLONOSCOPY N/A 10/01/2021    Procedure: COLONOSCOPY WITH ANESTHESIA;  Surgeon: Tom Velasco DO;  Location:  [facility];  Service: Gastroenterology;  Laterality: N/A;  pre: change in bowel habits  post: diverticulosis. hemorrhoids.   Oilvia Mora APRN        CYSTECTOMY      D & C HYSTEROSCOPY N/A 11/06/2017    Procedure: DILATATION AND CURETTAGE HYSTEROSCOPY;  Surgeon: Shasta Madrigal MD;  Location: [facility] OR;  Service:     DILATION AND CURETTAGE, DIAGNOSTIC / THERAPEUTIC  2008    ENDOSCOPY  09/23/2010    Short segment of Arriola's,Moderate chroninc esophagogastritis and negative H.pylori    ENDOSCOPY N/A 09/25/2017    Procedure: ESOPHAGOGASTRODUODENOSCOPY WITH ANESTHESIA;  Surgeon: Tom Velasco DO;  Location:  [facility];  Service:     EYE SURGERY      RETINA    HEMORRHOIDECTOMY SIGMOIDOSCOPY N/A 3/21/2023    Procedure: HEMORRHOIDECTOMY WITH EXAM UNDER ANESTHESIA;  Surgeon: Holly Chavez MD;  Location:  [facility] OR;  Service: General;  Laterality: N/A;    HYSTERECTOMY  12/20/2017    ORIF TIBIA/FIBULA FRACTURES Left 2000    TRANSVAGINAL TAPING SUSPENSION N/A 11/06/2017    Procedure: VAGINAL MESH REVISION;  Surgeon: Shasta Madrigal MD;  Location:  [facility] OR;  Service:     VAGINAL MESH REVISION  2013       Social History     Socioeconomic History    Marital status:    Tobacco Use    Smoking  "status: Never    Smokeless tobacco: Never   Vaping Use    Vaping status: Never Used   Substance and Sexual Activity    Alcohol use: Not Currently     Comment: occasional    Drug use: No    Sexual activity: Defer       Family History   Problem Relation Age of Onset    Diabetes Mother     Multiple myeloma Mother     Stroke Father     Diabetes Sister     Prostate cancer Brother     Lymphoma Brother         NHL    Ovarian cancer Paternal Aunt     Cancer Paternal Grandmother         metastatic    Lung cancer Paternal Grandfather     Colon cancer Neg Hx     Esophageal cancer Neg Hx     Breast cancer Neg Hx        Objective    Temp 97.8 °F (36.6 °C)   Ht 157.5 cm (62.01\")   Wt 70.3 kg (155 lb)   LMP  (LMP Unknown)   BMI 28.34 kg/m²     Physical Exam        Results for orders placed or performed during the hospital encounter of 11/12/24   Comprehensive Metabolic Panel    Collection Time: 11/12/24 12:58 PM    Specimen: Blood   Result Value Ref Range    Glucose 187 (H) 65 - 99 mg/dL    BUN 42 (H) 8 - 23 mg/dL    Creatinine 2.46 (H) 0.57 - 1.00 mg/dL    Sodium 142 136 - 145 mmol/L    Potassium 3.3 (L) 3.5 - 5.2 mmol/L    Chloride 109 (H) 98 - 107 mmol/L    CO2 17.0 (L) 22.0 - 29.0 mmol/L    Calcium 10.7 (H) 8.6 - 10.5 mg/dL    Total Protein 7.3 6.0 - 8.5 g/dL    Albumin 3.3 (L) 3.5 - 5.2 g/dL    ALT (SGPT) 6 1 - 33 U/L    AST (SGOT) 9 1 - 32 U/L    Alkaline Phosphatase 93 39 - 117 U/L    Total Bilirubin 0.3 0.0 - 1.2 mg/dL    Globulin 4.0 gm/dL    A/G Ratio 0.8 g/dL    BUN/Creatinine Ratio 17.1 7.0 - 25.0    Anion Gap 16.0 (H) 5.0 - 15.0 mmol/L    eGFR 19.1 (L) >60.0 mL/min/1.73   Lipase    Collection Time: 11/12/24 12:58 PM    Specimen: Blood   Result Value Ref Range    Lipase 10 (L) 13 - 60 U/L   Lactic Acid, Plasma    Collection Time: 11/12/24 12:58 PM    Specimen: Blood   Result Value Ref Range    Lactate 1.8 0.5 - 2.0 mmol/L   CBC Auto Differential    Collection Time: 11/12/24 12:58 PM    Specimen: Blood   Result " Value Ref Range    WBC 12.11 (H) 3.40 - 10.80 10*3/mm3    RBC 3.01 (L) 3.77 - 5.28 10*6/mm3    Hemoglobin 8.8 (L) 12.0 - 15.9 g/dL    Hematocrit 27.7 (L) 34.0 - 46.6 %    MCV 92.0 79.0 - 97.0 fL    MCH 29.2 26.6 - 33.0 pg    MCHC 31.8 31.5 - 35.7 g/dL    RDW 16.0 (H) 12.3 - 15.4 %    RDW-SD 53.1 37.0 - 54.0 fl    MPV 10.5 6.0 - 12.0 fL    Platelets 175 140 - 450 10*3/mm3    Neutrophil % 89.7 (H) 42.7 - 76.0 %    Lymphocyte % 5.0 (L) 19.6 - 45.3 %    Monocyte % 4.5 (L) 5.0 - 12.0 %    Eosinophil % 0.0 (L) 0.3 - 6.2 %    Basophil % 0.1 0.0 - 1.5 %    Immature Grans % 0.7 (H) 0.0 - 0.5 %    Neutrophils, Absolute 10.85 (H) 1.70 - 7.00 10*3/mm3    Lymphocytes, Absolute 0.61 (L) 0.70 - 3.10 10*3/mm3    Monocytes, Absolute 0.55 0.10 - 0.90 10*3/mm3    Eosinophils, Absolute 0.00 0.00 - 0.40 10*3/mm3    Basophils, Absolute 0.01 0.00 - 0.20 10*3/mm3    Immature Grans, Absolute 0.09 (H) 0.00 - 0.05 10*3/mm3    nRBC 0.0 0.0 - 0.2 /100 WBC   Magnesium    Collection Time: 11/12/24 12:58 PM    Specimen: Blood   Result Value Ref Range    Magnesium 2.1 1.6 - 2.4 mg/dL   Hemoglobin A1c    Collection Time: 11/12/24 12:58 PM    Specimen: Blood   Result Value Ref Range    Hemoglobin A1C 5.00 4.80 - 5.60 %   Green Top (Gel)    Collection Time: 11/12/24 12:58 PM   Result Value Ref Range    Extra Tube Hold for add-ons.    Lavender Top    Collection Time: 11/12/24 12:58 PM   Result Value Ref Range    Extra Tube hold for add-on    Red Top    Collection Time: 11/12/24 12:58 PM   Result Value Ref Range    Extra Tube Hold for add-ons.    Light Blue Top    Collection Time: 11/12/24 12:58 PM   Result Value Ref Range    Extra Tube Hold for add-ons.    Blood Culture - Blood, Arm, Left    Collection Time: 11/12/24  1:59 PM    Specimen: Arm, Left; Blood   Result Value Ref Range    Blood Culture No growth at 5 days    Urine Culture - Urine, Straight Cath    Collection Time: 11/12/24  2:36 PM    Specimen: Straight Cath; Urine   Result Value Ref Range     Urine Culture >100,000 CFU/mL Escherichia coli (A)        Susceptibility    Escherichia coli - CHULA     Amoxicillin + Clavulanate  Susceptible ug/ml     Ampicillin  Susceptible ug/ml     Ampicillin + Sulbactam  Susceptible ug/ml     Cefazolin  Susceptible ug/ml     Cefepime  Susceptible ug/ml     Ceftazidime  Susceptible ug/ml     Ceftriaxone  Susceptible ug/ml     Gentamicin  Susceptible ug/ml     Levofloxacin  Susceptible ug/ml     Nitrofurantoin  Susceptible ug/ml     Piperacillin + Tazobactam  Susceptible ug/ml     Trimethoprim + Sulfamethoxazole  Susceptible ug/ml   Urinalysis With Culture If Indicated - Straight Cath    Collection Time: 11/12/24  2:36 PM    Specimen: Straight Cath; Urine   Result Value Ref Range    Color, UA Yellow Yellow, Straw    Appearance, UA Turbid (A) Clear    pH, UA 5.5 5.0 - 8.0    Specific Gravity, UA 1.016 1.005 - 1.030    Glucose, UA Negative Negative    Ketones, UA 15 mg/dL (1+) (A) Negative    Bilirubin, UA Negative Negative    Blood, UA Large (3+) (A) Negative    Protein,  mg/dL (2+) (A) Negative    Leuk Esterase, UA Large (3+) (A) Negative    Nitrite, UA Positive (A) Negative    Urobilinogen, UA 1.0 E.U./dL 0.2 - 1.0 E.U./dL   Urinalysis, Microscopic Only - Straight Cath    Collection Time: 11/12/24  2:36 PM    Specimen: Straight Cath; Urine   Result Value Ref Range    RBC, UA 21-50 (A) None Seen, 0-2 /HPF    WBC, UA Too Numerous to Count (A) None Seen, 0-2 /HPF    Bacteria, UA 4+ (A) None Seen /HPF    Squamous Epithelial Cells, UA 0-2 None Seen, 0-2 /HPF    Yeast, UA Small/1+ Yeast (A) None Seen /HPF    Hyaline Casts, UA None Seen None Seen /LPF    Methodology Manual Light Microscopy    Blood Culture - Blood, Arm, Left    Collection Time: 11/12/24  2:53 PM    Specimen: Arm, Left; Blood   Result Value Ref Range    Blood Culture No growth at 5 days    Blood Gas, Arterial -    Collection Time: 11/12/24  8:12 PM    Specimen: Arterial Blood   Result Value Ref Range    Site  Left Radial     Donny's Test Positive     pH, Arterial 7.298 (L) 7.350 - 7.450 pH units    pCO2, Arterial 34.4 (L) 35.0 - 45.0 mm Hg    pO2, Arterial 82.6 (L) 83.0 - 108.0 mm Hg    HCO3, Arterial 16.8 (L) 20.0 - 26.0 mmol/L    Base Excess, Arterial -8.8 (L) 0.0 - 2.0 mmol/L    O2 Saturation, Arterial 98.6 94.0 - 99.0 %    Temperature 37.0     Barometric Pressure for Blood Gas 756 mmHg    Modality Room Air     Ventilator Mode NA     Collected by [id number]     pCO2, Temperature Corrected 34.4 (L) 35 - 45 mm Hg    pH, Temp Corrected 7.298 (L) 7.350 - 7.450 pH Units    pO2, Temperature Corrected 82.6 (L) 83 - 108 mm Hg   Basic Metabolic Panel    Collection Time: 11/13/24  4:41 AM    Specimen: Blood   Result Value Ref Range    Glucose 128 (H) 65 - 99 mg/dL    BUN 39 (H) 8 - 23 mg/dL    Creatinine 2.02 (H) 0.57 - 1.00 mg/dL    Sodium 147 (H) 136 - 145 mmol/L    Potassium 3.2 (L) 3.5 - 5.2 mmol/L    Chloride 116 (H) 98 - 107 mmol/L    CO2 16.0 (L) 22.0 - 29.0 mmol/L    Calcium 10.4 8.6 - 10.5 mg/dL    BUN/Creatinine Ratio 19.3 7.0 - 25.0    Anion Gap 15.0 5.0 - 15.0 mmol/L    eGFR 24.2 (L) >60.0 mL/min/1.73   Magnesium    Collection Time: 11/13/24  4:41 AM    Specimen: Blood   Result Value Ref Range    Magnesium 2.0 1.6 - 2.4 mg/dL   Phosphorus    Collection Time: 11/13/24  4:41 AM    Specimen: Blood   Result Value Ref Range    Phosphorus 1.8 (C) 2.5 - 4.5 mg/dL   CBC Auto Differential    Collection Time: 11/13/24  4:41 AM    Specimen: Blood   Result Value Ref Range    WBC 9.96 3.40 - 10.80 10*3/mm3    RBC 2.61 (L) 3.77 - 5.28 10*6/mm3    Hemoglobin 7.7 (L) 12.0 - 15.9 g/dL    Hematocrit 23.2 (L) 34.0 - 46.6 %    MCV 88.9 79.0 - 97.0 fL    MCH 29.5 26.6 - 33.0 pg    MCHC 33.2 31.5 - 35.7 g/dL    RDW 16.2 (H) 12.3 - 15.4 %    RDW-SD 52.5 37.0 - 54.0 fl    MPV 11.0 6.0 - 12.0 fL    Platelets 140 140 - 450 10*3/mm3   Manual Differential    Collection Time: 11/13/24  4:41 AM    Specimen: Blood   Result Value Ref Range     Neutrophil % 87.0 (H) 42.7 - 76.0 %    Lymphocyte % 4.0 (L) 19.6 - 45.3 %    Monocyte % 2.0 (L) 5.0 - 12.0 %    Eosinophil % 1.0 0.3 - 6.2 %    Basophil % 0.0 0.0 - 1.5 %    Bands %  6.0 (H) 0.0 - 5.0 %    Neutrophils Absolute 9.26 (H) 1.70 - 7.00 10*3/mm3    Lymphocytes Absolute 0.40 (L) 0.70 - 3.10 10*3/mm3    Monocytes Absolute 0.20 0.10 - 0.90 10*3/mm3    Eosinophils Absolute 0.10 0.00 - 0.40 10*3/mm3    Basophils Absolute 0.00 0.00 - 0.20 10*3/mm3    Anisocytosis Slight/1+ None Seen    Microcytes Slight/1+ None Seen    Polychromasia Slight/1+ None Seen    WBC Morphology Normal Normal    Platelet Estimate Decreased Normal   ECG 12 Lead Tachycardia    Collection Time: 11/13/24  1:53 PM   Result Value Ref Range    QT Interval 326 ms    QTC Interval 492 ms   Phosphorus    Collection Time: 11/13/24  4:32 PM    Specimen: Blood   Result Value Ref Range    Phosphorus 2.5 2.5 - 4.5 mg/dL   Potassium    Collection Time: 11/13/24  4:32 PM    Specimen: Blood   Result Value Ref Range    Potassium 2.2 (C) 3.5 - 5.2 mmol/L   Basic Metabolic Panel    Collection Time: 11/14/24  5:01 AM    Specimen: Blood   Result Value Ref Range    Glucose 115 (H) 65 - 99 mg/dL    BUN 29 (H) 8 - 23 mg/dL    Creatinine 1.59 (H) 0.57 - 1.00 mg/dL    Sodium 140 136 - 145 mmol/L    Potassium 2.8 (L) 3.5 - 5.2 mmol/L    Chloride 113 (H) 98 - 107 mmol/L    CO2 17.0 (L) 22.0 - 29.0 mmol/L    Calcium 9.4 8.6 - 10.5 mg/dL    BUN/Creatinine Ratio 18.2 7.0 - 25.0    Anion Gap 10.0 5.0 - 15.0 mmol/L    eGFR 32.3 (L) >60.0 mL/min/1.73   Phosphorus    Collection Time: 11/14/24  5:01 AM    Specimen: Blood   Result Value Ref Range    Phosphorus 2.0 (L) 2.5 - 4.5 mg/dL   Adult Transthoracic Echo Complete W/ Cont if Necessary Per Protocol    Collection Time: 11/14/24 10:45 AM   Result Value Ref Range    EF(MOD-bp) 79.1 %    LVIDd 3.7 cm    LVIDs 2.30 cm    IVSd 1.20 cm    LVPWd 1.2 cm    FS 37.8 %    IVS/LVPW 1.09 cm    ESV(cubed) 12.2 ml    LV Sys Vol (BSA  corrected) 9.3 cm2    EDV(cubed) 50.7 ml    LV Jasso Vol (BSA corrected) 44.0 cm2    LV mass(C)d 138.2 grams    LVOT area 3.1 cm2    LVOT diam 2.00 cm    EDV(MOD-sp2) 83.2 ml    EDV(MOD-sp4) 75.4 ml    ESV(MOD-sp2) 16.5 ml    ESV(MOD-sp4) 16.0 ml    SV(MOD-sp2) 66.7 ml    SV(MOD-sp4) 59.4 ml    SVi(MOD-SP2) 38.9 ml/m2    SVi(MOD-SP4) 34.6 ml/m2    SVi (LVOT) 50.9 ml/m2    EF(MOD-sp2) 80.2 %    EF(MOD-sp4) 78.8 %    MV E max rajiv 121.0 cm/sec    MV A max rajiv 135.0 cm/sec    MV dec time 0.24 sec    MV E/A 0.90     LA ESV Index (BP) 47.3 ml/m2    Med Peak E' Rajiv 7.2 cm/sec    Lat Peak E' Rajiv 7.5 cm/sec    TR max rajiv 293.0 cm/sec    Avg E/e' ratio 16.46     SV(LVOT) 87.3 ml    RV Base 4.1 cm    RV Mid 2.40 cm    RV Length 4.5 cm    LA dimension (2D)  5.4 cm    LV V1 max 154.0 cm/sec    LV V1 max PG 9.5 mmHg    LV V1 mean PG 5.0 mmHg    LV V1 VTI 27.8 cm    Ao pk rajiv 164.0 cm/sec    Ao max PG 10.8 mmHg    Ao mean PG 6.0 mmHg    Ao V2 VTI 29.3 cm    LYSSA(I,D) 3.0 cm2    MV max PG 12.3 mmHg    MV mean PG 6.0 mmHg    MV V2 VTI 47.4 cm    MV P1/2t 96.0 msec    MVA(P1/2t) 2.29 cm2    MVA(VTI) 1.84 cm2    MV dec slope 552.0 cm/sec2    TR max PG 34.3 mmHg    RVSP(TR) 37.3 mmHg    RAP systole 3.0 mmHg    Ao root diam 3.0 cm    LA ESV (BP) 81.4 ml   CBC Auto Differential    Collection Time: 11/14/24  4:14 PM    Specimen: Blood   Result Value Ref Range    WBC 6.45 3.40 - 10.80 10*3/mm3    RBC 2.37 (L) 3.77 - 5.28 10*6/mm3    Hemoglobin 7.0 (L) 12.0 - 15.9 g/dL    Hematocrit 21.9 (L) 34.0 - 46.6 %    MCV 92.4 79.0 - 97.0 fL    MCH 29.5 26.6 - 33.0 pg    MCHC 32.0 31.5 - 35.7 g/dL    RDW 16.3 (H) 12.3 - 15.4 %    RDW-SD 54.6 (H) 37.0 - 54.0 fl    MPV 10.5 6.0 - 12.0 fL    Platelets 139 (L) 140 - 450 10*3/mm3    Neutrophil % 79.3 (H) 42.7 - 76.0 %    Lymphocyte % 13.5 (L) 19.6 - 45.3 %    Monocyte % 5.3 5.0 - 12.0 %    Eosinophil % 1.1 0.3 - 6.2 %    Basophil % 0.2 0.0 - 1.5 %    Immature Grans % 0.6 (H) 0.0 - 0.5 %    Neutrophils,  Absolute 5.12 1.70 - 7.00 10*3/mm3    Lymphocytes, Absolute 0.87 0.70 - 3.10 10*3/mm3    Monocytes, Absolute 0.34 0.10 - 0.90 10*3/mm3    Eosinophils, Absolute 0.07 0.00 - 0.40 10*3/mm3    Basophils, Absolute 0.01 0.00 - 0.20 10*3/mm3    Immature Grans, Absolute 0.04 0.00 - 0.05 10*3/mm3   Potassium    Collection Time: 11/14/24  4:14 PM    Specimen: Blood   Result Value Ref Range    Potassium 3.0 (L) 3.5 - 5.2 mmol/L   Vitamin B12    Collection Time: 11/14/24  4:14 PM    Specimen: Blood   Result Value Ref Range    Vitamin B-12 523 211 - 946 pg/mL   Basic Metabolic Panel    Collection Time: 11/14/24  4:14 PM    Specimen: Blood   Result Value Ref Range    Glucose 128 (H) 65 - 99 mg/dL    BUN 27 (H) 8 - 23 mg/dL    Creatinine 1.55 (H) 0.57 - 1.00 mg/dL    Sodium 141 136 - 145 mmol/L    Potassium 2.8 (L) 3.5 - 5.2 mmol/L    Chloride 114 (H) 98 - 107 mmol/L    CO2 19.0 (L) 22.0 - 29.0 mmol/L    Calcium 9.5 8.6 - 10.5 mg/dL    BUN/Creatinine Ratio 17.4 7.0 - 25.0    Anion Gap 8.0 5.0 - 15.0 mmol/L    eGFR 33.3 (L) >60.0 mL/min/1.73   Basic Metabolic Panel    Collection Time: 11/15/24  3:13 AM    Specimen: Blood   Result Value Ref Range    Glucose 117 (H) 65 - 99 mg/dL    BUN 27 (H) 8 - 23 mg/dL    Creatinine 1.37 (H) 0.57 - 1.00 mg/dL    Sodium 141 136 - 145 mmol/L    Potassium 4.7 3.5 - 5.2 mmol/L    Chloride 117 (H) 98 - 107 mmol/L    CO2 16.0 (L) 22.0 - 29.0 mmol/L    Calcium 9.2 8.6 - 10.5 mg/dL    BUN/Creatinine Ratio 19.7 7.0 - 25.0    Anion Gap 8.0 5.0 - 15.0 mmol/L    eGFR 38.6 (L) >60.0 mL/min/1.73   Phosphorus    Collection Time: 11/15/24  3:13 AM    Specimen: Blood   Result Value Ref Range    Phosphorus 2.1 (L) 2.5 - 4.5 mg/dL   Hemoglobin & Hematocrit, Blood    Collection Time: 11/15/24  3:13 AM    Specimen: Blood   Result Value Ref Range    Hemoglobin 6.6 (C) 12.0 - 15.9 g/dL    Hematocrit 20.8 (C) 34.0 - 46.6 %   Type & Screen    Collection Time: 11/15/24  4:21 AM    Specimen: Blood   Result Value Ref Range     ABO Type O     RH type Positive     Antibody Screen Negative     T&S Expiration Date 11/18/2024 11:59:59 PM    Phosphorus    Collection Time: 11/15/24  4:24 PM    Specimen: Blood   Result Value Ref Range    Phosphorus 2.1 (L) 2.5 - 4.5 mg/dL   CBC Auto Differential    Collection Time: 11/15/24  4:24 PM    Specimen: Blood   Result Value Ref Range    WBC 7.91 3.40 - 10.80 10*3/mm3    RBC 2.69 (L) 3.77 - 5.28 10*6/mm3    Hemoglobin 7.9 (L) 12.0 - 15.9 g/dL    Hematocrit 24.3 (L) 34.0 - 46.6 %    MCV 90.3 79.0 - 97.0 fL    MCH 29.4 26.6 - 33.0 pg    MCHC 32.5 31.5 - 35.7 g/dL    RDW 16.0 (H) 12.3 - 15.4 %    RDW-SD 52.7 37.0 - 54.0 fl    MPV 10.2 6.0 - 12.0 fL    Platelets 139 (L) 140 - 450 10*3/mm3    Neutrophil % 76.2 (H) 42.7 - 76.0 %    Lymphocyte % 13.9 (L) 19.6 - 45.3 %    Monocyte % 6.7 5.0 - 12.0 %    Eosinophil % 2.1 0.3 - 6.2 %    Basophil % 0.1 0.0 - 1.5 %    Immature Grans % 1.0 (H) 0.0 - 0.5 %    Neutrophils, Absolute 6.02 1.70 - 7.00 10*3/mm3    Lymphocytes, Absolute 1.10 0.70 - 3.10 10*3/mm3    Monocytes, Absolute 0.53 0.10 - 0.90 10*3/mm3    Eosinophils, Absolute 0.17 0.00 - 0.40 10*3/mm3    Basophils, Absolute 0.01 0.00 - 0.20 10*3/mm3    Immature Grans, Absolute 0.08 (H) 0.00 - 0.05 10*3/mm3   Phosphorus    Collection Time: 11/16/24  3:24 AM    Specimen: Blood   Result Value Ref Range    Phosphorus 4.0 2.5 - 4.5 mg/dL   Basic Metabolic Panel    Collection Time: 11/16/24  3:24 AM    Specimen: Blood   Result Value Ref Range    Glucose 108 (H) 65 - 99 mg/dL    BUN 22 8 - 23 mg/dL    Creatinine 1.22 (H) 0.57 - 1.00 mg/dL    Sodium 139 136 - 145 mmol/L    Potassium 4.1 3.5 - 5.2 mmol/L    Chloride 114 (H) 98 - 107 mmol/L    CO2 17.0 (L) 22.0 - 29.0 mmol/L    Calcium 9.5 8.6 - 10.5 mg/dL    BUN/Creatinine Ratio 18.0 7.0 - 25.0    Anion Gap 8.0 5.0 - 15.0 mmol/L    eGFR 44.4 (L) >60.0 mL/min/1.73   CBC (No Diff)    Collection Time: 11/16/24  3:24 AM    Specimen: Blood   Result Value Ref Range    WBC 7.52  "3.40 - 10.80 10*3/mm3    RBC 2.94 (L) 3.77 - 5.28 10*6/mm3    Hemoglobin 8.7 (L) 12.0 - 15.9 g/dL    Hematocrit 25.9 (L) 34.0 - 46.6 %    MCV 88.1 79.0 - 97.0 fL    MCH 29.6 26.6 - 33.0 pg    MCHC 33.6 31.5 - 35.7 g/dL    RDW 16.0 (H) 12.3 - 15.4 %    RDW-SD 50.7 37.0 - 54.0 fl    MPV 10.1 6.0 - 12.0 fL    Platelets 134 (L) 140 - 450 10*3/mm3   Prepare RBC, 1 Units    Collection Time: 11/16/24  5:40 AM   Result Value Ref Range    Product Code K4306T20     Unit Number [id number]     UNIT  ABO O     UNIT  RH POS     Crossmatch Interpretation Compatible     Dispense Status PT     Blood Expiration Date [id number]     Blood Type Barcode 5100    Basic Metabolic Panel    Collection Time: 11/17/24  9:26 AM    Specimen: Blood   Result Value Ref Range    Glucose 139 (H) 65 - 99 mg/dL    BUN 21 8 - 23 mg/dL    Creatinine 1.22 (H) 0.57 - 1.00 mg/dL    Sodium 139 136 - 145 mmol/L    Potassium 3.4 (L) 3.5 - 5.2 mmol/L    Chloride 107 98 - 107 mmol/L    CO2 19.0 (L) 22.0 - 29.0 mmol/L    Calcium 9.4 8.6 - 10.5 mg/dL    BUN/Creatinine Ratio 17.2 7.0 - 25.0    Anion Gap 13.0 5.0 - 15.0 mmol/L    eGFR 44.4 (L) >60.0 mL/min/1.73   Potassium    Collection Time: 11/18/24  3:39 AM    Specimen: Blood   Result Value Ref Range    Potassium 4.4 3.5 - 5.2 mmol/L     Assessment and Plan    Diagnoses and all orders for this visit:    1. Bilateral hydronephrosis (Primary)      Mentation back to baseline    Patient very adamant she wants indwelling Smith catheter removed.  We have had lengthy discussion regarding the fact that ultimately patient may require a chronic indwelling Smith versus suprapubic tube as patient unable to perform self catheterization.  Patient became very upset almost tearful during this encounter stating \"I do not want to live with the catheter I cannot have a catheter in place all the time\".    Will remove catheter at this time I am stressed the importance of patient returning to the nearest emergency room within 6 " to 8 hours if unable to urinate    Will have patient return in 2 weeks to check a postvoid residual

## 2024-12-02 ENCOUNTER — OFFICE VISIT (OUTPATIENT)
Dept: UROLOGY | Facility: CLINIC | Age: 84
End: 2024-12-02
Payer: MEDICARE

## 2024-12-02 VITALS — HEIGHT: 62 IN | WEIGHT: 155 LBS | TEMPERATURE: 97.8 F | BODY MASS INDEX: 28.52 KG/M2

## 2024-12-02 DIAGNOSIS — N13.30 BILATERAL HYDRONEPHROSIS: Primary | ICD-10-CM

## 2024-12-02 PROCEDURE — 1160F RVW MEDS BY RX/DR IN RCRD: CPT

## 2024-12-02 PROCEDURE — 99214 OFFICE O/P EST MOD 30 MIN: CPT

## 2024-12-02 PROCEDURE — 1159F MED LIST DOCD IN RCRD: CPT

## 2024-12-02 RX ORDER — SUMATRIPTAN 50 MG/1
TABLET, FILM COATED ORAL EVERY 24 HOURS
COMMUNITY
Start: 2024-06-13

## 2024-12-02 RX ORDER — OXYCODONE HYDROCHLORIDE 15 MG/1
TABLET ORAL
COMMUNITY
Start: 2024-09-28

## 2024-12-02 NOTE — PROGRESS NOTES
Jennifer Gomes is a 84 y.o. female who is here today for catheter removal. Patient of Akosuapedro pablo MORA. 10cc syringe used to deflate the balloon and the catheter was removed without difficulty.  The patient tolerated this well and will return in 2 weeks to see Akosua in the office for follow up. Akosua MORA was in the office at the time of procedure.     Patient was advised to drink clear fluids for the next couple hours and urinate. The patient was also advised she may experience some blood in the urine and burning with urination for the next couple days. If the patient is unable to urinate or develops fever, chills, N&V or suprapubic pain she will call to return for an appt at clinic or seek medical treatment at [facility] ER, PCP or Urgent Care after hours. Patient verbalized understanding and all questions were answered. JAYLEN Keene RN     I have reviewed and agree with medical assistance documentation above

## 2024-12-17 ENCOUNTER — TELEPHONE (OUTPATIENT)
Dept: UROLOGY | Facility: CLINIC | Age: 84
End: 2024-12-17

## 2024-12-17 NOTE — TELEPHONE ENCOUNTER
Provider: RYANN AMES    Caller: Ivonne Liang    Relationship to Patient: Emergency Contact      Reason for Call: PT IS NOT FEELING WELL ENOUGH TO COME INTO THE OFFICE TODAY 12/17/24 @ 3PM.  SHE DOES NOT HAVE THE ENERGY TO MAKE IT TO THE CAR.    PT DAUGHTER HAS REQUESTED IF THEY CAN DO A MYCHART VIDEO VISIT.    PLEASE CALL TO CONFIRM

## 2024-12-18 ENCOUNTER — APPOINTMENT (OUTPATIENT)
Dept: ULTRASOUND IMAGING | Facility: HOSPITAL | Age: 84
End: 2024-12-18
Payer: MEDICARE

## 2024-12-18 ENCOUNTER — HOSPITAL ENCOUNTER (INPATIENT)
Facility: HOSPITAL | Age: 84
LOS: 6 days | Discharge: NURSING, REHAB OR LONG-TERM CARE | End: 2024-12-24
Attending: EMERGENCY MEDICINE | Admitting: FAMILY MEDICINE
Payer: MEDICARE

## 2024-12-18 ENCOUNTER — APPOINTMENT (OUTPATIENT)
Dept: GENERAL RADIOLOGY | Facility: HOSPITAL | Age: 84
End: 2024-12-18
Payer: MEDICARE

## 2024-12-18 ENCOUNTER — APPOINTMENT (OUTPATIENT)
Dept: CT IMAGING | Facility: HOSPITAL | Age: 84
End: 2024-12-18
Payer: MEDICARE

## 2024-12-18 DIAGNOSIS — G43.009 MIGRAINE WITHOUT AURA AND WITHOUT STATUS MIGRAINOSUS, NOT INTRACTABLE: ICD-10-CM

## 2024-12-18 DIAGNOSIS — L89.90 PRESSURE INJURY OF SKIN, UNSPECIFIED INJURY STAGE, UNSPECIFIED LOCATION: ICD-10-CM

## 2024-12-18 DIAGNOSIS — R13.10 DYSPHAGIA, UNSPECIFIED TYPE: ICD-10-CM

## 2024-12-18 DIAGNOSIS — A41.9 SEPSIS, DUE TO UNSPECIFIED ORGANISM, UNSPECIFIED WHETHER ACUTE ORGAN DYSFUNCTION PRESENT: ICD-10-CM

## 2024-12-18 DIAGNOSIS — R41.82 ALTERED MENTAL STATUS, UNSPECIFIED ALTERED MENTAL STATUS TYPE: ICD-10-CM

## 2024-12-18 DIAGNOSIS — N39.0 ACUTE UTI: ICD-10-CM

## 2024-12-18 DIAGNOSIS — Z74.09 IMPAIRED MOBILITY: ICD-10-CM

## 2024-12-18 DIAGNOSIS — B37.2 CANDIDAL INTERTRIGO: ICD-10-CM

## 2024-12-18 DIAGNOSIS — N17.9 AKI (ACUTE KIDNEY INJURY): Primary | ICD-10-CM

## 2024-12-18 LAB
ALBUMIN SERPL-MCNC: 3.8 G/DL (ref 3.5–5.2)
ALBUMIN/GLOB SERPL: 1 G/DL
ALP SERPL-CCNC: 89 U/L (ref 39–117)
ALT SERPL W P-5'-P-CCNC: 5 U/L (ref 1–33)
ANION GAP SERPL CALCULATED.3IONS-SCNC: 15 MMOL/L (ref 5–15)
ANION GAP SERPL CALCULATED.3IONS-SCNC: 20 MMOL/L (ref 5–15)
APTT PPP: 38.9 SECONDS (ref 24.5–36)
AST SERPL-CCNC: 14 U/L (ref 1–32)
BACTERIA UR QL AUTO: ABNORMAL /HPF
BASOPHILS # BLD AUTO: 0.03 10*3/MM3 (ref 0–0.2)
BASOPHILS NFR BLD AUTO: 0.3 % (ref 0–1.5)
BILIRUB SERPL-MCNC: 0.2 MG/DL (ref 0–1.2)
BILIRUB UR QL STRIP: NEGATIVE
BUN SERPL-MCNC: 49 MG/DL (ref 8–23)
BUN SERPL-MCNC: 53 MG/DL (ref 8–23)
BUN/CREAT SERPL: 18 (ref 7–25)
BUN/CREAT SERPL: 19.5 (ref 7–25)
CALCIUM SPEC-SCNC: 8.2 MG/DL (ref 8.6–10.5)
CALCIUM SPEC-SCNC: 9.4 MG/DL (ref 8.6–10.5)
CHLORIDE SERPL-SCNC: 103 MMOL/L (ref 98–107)
CHLORIDE SERPL-SCNC: 111 MMOL/L (ref 98–107)
CLARITY UR: ABNORMAL
CO2 SERPL-SCNC: 15 MMOL/L (ref 22–29)
CO2 SERPL-SCNC: 15 MMOL/L (ref 22–29)
COLOR UR: YELLOW
CREAT SERPL-MCNC: 2.51 MG/DL (ref 0.57–1)
CREAT SERPL-MCNC: 2.95 MG/DL (ref 0.57–1)
D-LACTATE SERPL-SCNC: 2.7 MMOL/L (ref 0.5–2)
DEPRECATED RDW RBC AUTO: 50.9 FL (ref 37–54)
EGFRCR SERPLBLD CKD-EPI 2021: 15.4 ML/MIN/1.73
EGFRCR SERPLBLD CKD-EPI 2021: 18.7 ML/MIN/1.73
EOSINOPHIL # BLD AUTO: 0.03 10*3/MM3 (ref 0–0.4)
EOSINOPHIL NFR BLD AUTO: 0.3 % (ref 0.3–6.2)
ERYTHROCYTE [DISTWIDTH] IN BLOOD BY AUTOMATED COUNT: 15.5 % (ref 12.3–15.4)
FLUAV RNA RESP QL NAA+PROBE: NOT DETECTED
FLUBV RNA RESP QL NAA+PROBE: NOT DETECTED
GLOBULIN UR ELPH-MCNC: 4 GM/DL
GLUCOSE SERPL-MCNC: 119 MG/DL (ref 65–99)
GLUCOSE SERPL-MCNC: 82 MG/DL (ref 65–99)
GLUCOSE UR STRIP-MCNC: NEGATIVE MG/DL
HCT VFR BLD AUTO: 32.6 % (ref 34–46.6)
HGB BLD-MCNC: 10.4 G/DL (ref 12–15.9)
HGB UR QL STRIP.AUTO: ABNORMAL
HYALINE CASTS UR QL AUTO: ABNORMAL /LPF
IMM GRANULOCYTES # BLD AUTO: 0.04 10*3/MM3 (ref 0–0.05)
IMM GRANULOCYTES NFR BLD AUTO: 0.4 % (ref 0–0.5)
INR PPP: 2.36 (ref 0.91–1.09)
KETONES UR QL STRIP: NEGATIVE
LEUKOCYTE ESTERASE UR QL STRIP.AUTO: ABNORMAL
LIPASE SERPL-CCNC: 20 U/L (ref 13–60)
LYMPHOCYTES # BLD AUTO: 1.88 10*3/MM3 (ref 0.7–3.1)
LYMPHOCYTES NFR BLD AUTO: 19.1 % (ref 19.6–45.3)
MAGNESIUM SERPL-MCNC: 2 MG/DL (ref 1.6–2.4)
MCH RBC QN AUTO: 29.1 PG (ref 26.6–33)
MCHC RBC AUTO-ENTMCNC: 31.9 G/DL (ref 31.5–35.7)
MCV RBC AUTO: 91.3 FL (ref 79–97)
MONOCYTES # BLD AUTO: 0.6 10*3/MM3 (ref 0.1–0.9)
MONOCYTES NFR BLD AUTO: 6.1 % (ref 5–12)
NEUTROPHILS NFR BLD AUTO: 7.27 10*3/MM3 (ref 1.7–7)
NEUTROPHILS NFR BLD AUTO: 73.8 % (ref 42.7–76)
NITRITE UR QL STRIP: POSITIVE
NRBC BLD AUTO-RTO: 0 /100 WBC (ref 0–0.2)
PH UR STRIP.AUTO: 5.5 [PH] (ref 5–8)
PLATELET # BLD AUTO: 260 10*3/MM3 (ref 140–450)
PMV BLD AUTO: 10.9 FL (ref 6–12)
POTASSIUM SERPL-SCNC: 3.2 MMOL/L (ref 3.5–5.2)
POTASSIUM SERPL-SCNC: 3.4 MMOL/L (ref 3.5–5.2)
PROT SERPL-MCNC: 7.8 G/DL (ref 6–8.5)
PROT UR QL STRIP: ABNORMAL
PROTHROMBIN TIME: 26.8 SECONDS (ref 11.8–14.8)
QT INTERVAL: 406 MS
QTC INTERVAL: 479 MS
RBC # BLD AUTO: 3.57 10*6/MM3 (ref 3.77–5.28)
RBC # UR STRIP: ABNORMAL /HPF
REF LAB TEST METHOD: ABNORMAL
SARS-COV-2 RNA RESP QL NAA+PROBE: NOT DETECTED
SODIUM SERPL-SCNC: 138 MMOL/L (ref 136–145)
SODIUM SERPL-SCNC: 141 MMOL/L (ref 136–145)
SP GR UR STRIP: 1.02 (ref 1–1.03)
SQUAMOUS #/AREA URNS HPF: ABNORMAL /HPF
UROBILINOGEN UR QL STRIP: ABNORMAL
WBC # UR STRIP: ABNORMAL /HPF
WBC NRBC COR # BLD AUTO: 9.85 10*3/MM3 (ref 3.4–10.8)

## 2024-12-18 PROCEDURE — 83735 ASSAY OF MAGNESIUM: CPT | Performed by: EMERGENCY MEDICINE

## 2024-12-18 PROCEDURE — 99285 EMERGENCY DEPT VISIT HI MDM: CPT

## 2024-12-18 PROCEDURE — 87040 BLOOD CULTURE FOR BACTERIA: CPT | Performed by: EMERGENCY MEDICINE

## 2024-12-18 PROCEDURE — 25010000002 CEFTRIAXONE PER 250 MG: Performed by: EMERGENCY MEDICINE

## 2024-12-18 PROCEDURE — 25810000003 SODIUM CHLORIDE 0.9 % SOLUTION: Performed by: EMERGENCY MEDICINE

## 2024-12-18 PROCEDURE — 25010000002 NALOXONE PER 1 MG

## 2024-12-18 PROCEDURE — 25810000003 SODIUM CHLORIDE 0.9 % SOLUTION: Performed by: INTERNAL MEDICINE

## 2024-12-18 PROCEDURE — 85610 PROTHROMBIN TIME: CPT | Performed by: EMERGENCY MEDICINE

## 2024-12-18 PROCEDURE — 93005 ELECTROCARDIOGRAM TRACING: CPT | Performed by: EMERGENCY MEDICINE

## 2024-12-18 PROCEDURE — 25810000003 LACTATED RINGERS PER 1000 ML: Performed by: EMERGENCY MEDICINE

## 2024-12-18 PROCEDURE — 83605 ASSAY OF LACTIC ACID: CPT | Performed by: EMERGENCY MEDICINE

## 2024-12-18 PROCEDURE — 25010000002 CEFTRIAXONE PER 250 MG: Performed by: INTERNAL MEDICINE

## 2024-12-18 PROCEDURE — 85025 COMPLETE CBC W/AUTO DIFF WBC: CPT | Performed by: EMERGENCY MEDICINE

## 2024-12-18 PROCEDURE — 87636 SARSCOV2 & INF A&B AMP PRB: CPT | Performed by: EMERGENCY MEDICINE

## 2024-12-18 PROCEDURE — 87088 URINE BACTERIA CULTURE: CPT | Performed by: EMERGENCY MEDICINE

## 2024-12-18 PROCEDURE — 71045 X-RAY EXAM CHEST 1 VIEW: CPT

## 2024-12-18 PROCEDURE — 80053 COMPREHEN METABOLIC PANEL: CPT | Performed by: EMERGENCY MEDICINE

## 2024-12-18 PROCEDURE — 81001 URINALYSIS AUTO W/SCOPE: CPT | Performed by: EMERGENCY MEDICINE

## 2024-12-18 PROCEDURE — 70450 CT HEAD/BRAIN W/O DYE: CPT

## 2024-12-18 PROCEDURE — 83690 ASSAY OF LIPASE: CPT | Performed by: EMERGENCY MEDICINE

## 2024-12-18 PROCEDURE — 76775 US EXAM ABDO BACK WALL LIM: CPT

## 2024-12-18 PROCEDURE — 25010000002 LORAZEPAM PER 2 MG: Performed by: EMERGENCY MEDICINE

## 2024-12-18 PROCEDURE — 25010000002 ENOXAPARIN PER 10 MG: Performed by: INTERNAL MEDICINE

## 2024-12-18 PROCEDURE — 25010000002 SODIUM CHLORIDE 0.9 % WITH KCL 20 MEQ 20-0.9 MEQ/L-% SOLUTION: Performed by: FAMILY MEDICINE

## 2024-12-18 PROCEDURE — 92610 EVALUATE SWALLOWING FUNCTION: CPT

## 2024-12-18 PROCEDURE — 87086 URINE CULTURE/COLONY COUNT: CPT | Performed by: EMERGENCY MEDICINE

## 2024-12-18 PROCEDURE — 93010 ELECTROCARDIOGRAM REPORT: CPT | Performed by: HOSPITALIST

## 2024-12-18 PROCEDURE — 36415 COLL VENOUS BLD VENIPUNCTURE: CPT

## 2024-12-18 PROCEDURE — P9612 CATHETERIZE FOR URINE SPEC: HCPCS

## 2024-12-18 PROCEDURE — 72125 CT NECK SPINE W/O DYE: CPT

## 2024-12-18 PROCEDURE — 87186 SC STD MICRODIL/AGAR DIL: CPT | Performed by: EMERGENCY MEDICINE

## 2024-12-18 PROCEDURE — 85730 THROMBOPLASTIN TIME PARTIAL: CPT | Performed by: EMERGENCY MEDICINE

## 2024-12-18 RX ORDER — SODIUM CHLORIDE 9 MG/ML
40 INJECTION, SOLUTION INTRAVENOUS AS NEEDED
Status: DISCONTINUED | OUTPATIENT
Start: 2024-12-18 | End: 2024-12-24 | Stop reason: HOSPADM

## 2024-12-18 RX ORDER — NALOXONE HYDROCHLORIDE 0.4 MG/ML
INJECTION, SOLUTION INTRAMUSCULAR; INTRAVENOUS; SUBCUTANEOUS
Status: COMPLETED
Start: 2024-12-18 | End: 2024-12-18

## 2024-12-18 RX ORDER — SODIUM CHLORIDE, SODIUM LACTATE, POTASSIUM CHLORIDE, CALCIUM CHLORIDE 600; 310; 30; 20 MG/100ML; MG/100ML; MG/100ML; MG/100ML
125 INJECTION, SOLUTION INTRAVENOUS CONTINUOUS
Status: DISCONTINUED | OUTPATIENT
Start: 2024-12-18 | End: 2024-12-18

## 2024-12-18 RX ORDER — BUTALBITAL, ACETAMINOPHEN AND CAFFEINE 50; 325; 40 MG/1; MG/1; MG/1
1 TABLET ORAL 2 TIMES DAILY
Status: ON HOLD | COMMUNITY
End: 2024-12-24

## 2024-12-18 RX ORDER — PANTOPRAZOLE SODIUM 40 MG/1
40 TABLET, DELAYED RELEASE ORAL
Status: DISCONTINUED | OUTPATIENT
Start: 2024-12-18 | End: 2024-12-24 | Stop reason: HOSPADM

## 2024-12-18 RX ORDER — ENOXAPARIN SODIUM 100 MG/ML
30 INJECTION SUBCUTANEOUS DAILY
Status: DISCONTINUED | OUTPATIENT
Start: 2024-12-18 | End: 2024-12-21

## 2024-12-18 RX ORDER — ATORVASTATIN CALCIUM 40 MG/1
40 TABLET, FILM COATED ORAL DAILY
Status: DISCONTINUED | OUTPATIENT
Start: 2024-12-18 | End: 2024-12-24 | Stop reason: HOSPADM

## 2024-12-18 RX ORDER — LEVOTHYROXINE SODIUM 50 UG/1
50 TABLET ORAL
Status: DISCONTINUED | OUTPATIENT
Start: 2024-12-18 | End: 2024-12-24 | Stop reason: HOSPADM

## 2024-12-18 RX ORDER — SODIUM CHLORIDE 0.9 % (FLUSH) 0.9 %
10 SYRINGE (ML) INJECTION AS NEEDED
Status: DISCONTINUED | OUTPATIENT
Start: 2024-12-18 | End: 2024-12-24 | Stop reason: HOSPADM

## 2024-12-18 RX ORDER — LORAZEPAM 2 MG/ML
0.5 INJECTION INTRAMUSCULAR ONCE
Status: COMPLETED | OUTPATIENT
Start: 2024-12-18 | End: 2024-12-18

## 2024-12-18 RX ORDER — ACETAMINOPHEN 325 MG/1
650 TABLET ORAL EVERY 6 HOURS PRN
Status: DISCONTINUED | OUTPATIENT
Start: 2024-12-18 | End: 2024-12-24 | Stop reason: HOSPADM

## 2024-12-18 RX ORDER — NALOXONE HCL 0.4 MG/ML
0.4 VIAL (ML) INJECTION ONCE
Status: COMPLETED | OUTPATIENT
Start: 2024-12-18 | End: 2024-12-18

## 2024-12-18 RX ORDER — SODIUM CHLORIDE 9 MG/ML
100 INJECTION, SOLUTION INTRAVENOUS CONTINUOUS
Status: DISCONTINUED | OUTPATIENT
Start: 2024-12-18 | End: 2024-12-18

## 2024-12-18 RX ORDER — CARVEDILOL 3.12 MG/1
3.12 TABLET ORAL 2 TIMES DAILY WITH MEALS
Status: DISCONTINUED | OUTPATIENT
Start: 2024-12-18 | End: 2024-12-24 | Stop reason: HOSPADM

## 2024-12-18 RX ORDER — QUETIAPINE FUMARATE 25 MG/1
25 TABLET, FILM COATED ORAL NIGHTLY
Status: DISCONTINUED | OUTPATIENT
Start: 2024-12-18 | End: 2024-12-24

## 2024-12-18 RX ORDER — SODIUM CHLORIDE AND POTASSIUM CHLORIDE 150; 900 MG/100ML; MG/100ML
125 INJECTION, SOLUTION INTRAVENOUS CONTINUOUS
Status: DISCONTINUED | OUTPATIENT
Start: 2024-12-18 | End: 2024-12-19

## 2024-12-18 RX ORDER — AMLODIPINE BESYLATE 5 MG/1
5 TABLET ORAL
Status: DISCONTINUED | OUTPATIENT
Start: 2024-12-18 | End: 2024-12-18

## 2024-12-18 RX ORDER — DONEPEZIL HYDROCHLORIDE 10 MG/1
10 TABLET, FILM COATED ORAL NIGHTLY
Status: DISCONTINUED | OUTPATIENT
Start: 2024-12-18 | End: 2024-12-24 | Stop reason: HOSPADM

## 2024-12-18 RX ORDER — SODIUM CHLORIDE 0.9 % (FLUSH) 0.9 %
10 SYRINGE (ML) INJECTION AS NEEDED
Status: DISCONTINUED | OUTPATIENT
Start: 2024-12-18 | End: 2024-12-19

## 2024-12-18 RX ORDER — NITROGLYCERIN 0.4 MG/1
0.4 TABLET SUBLINGUAL
Status: DISCONTINUED | OUTPATIENT
Start: 2024-12-18 | End: 2024-12-22

## 2024-12-18 RX ORDER — SODIUM CHLORIDE 0.9 % (FLUSH) 0.9 %
10 SYRINGE (ML) INJECTION EVERY 12 HOURS SCHEDULED
Status: DISCONTINUED | OUTPATIENT
Start: 2024-12-18 | End: 2024-12-24 | Stop reason: HOSPADM

## 2024-12-18 RX ADMIN — SODIUM CHLORIDE AND POTASSIUM CHLORIDE 100 ML/HR: .9; .15 SOLUTION INTRAVENOUS at 21:09

## 2024-12-18 RX ADMIN — ZINC OXIDE 1 APPLICATION: 200 OINTMENT TOPICAL at 21:10

## 2024-12-18 RX ADMIN — SODIUM CHLORIDE, POTASSIUM CHLORIDE, SODIUM LACTATE AND CALCIUM CHLORIDE 125 ML/HR: 600; 310; 30; 20 INJECTION, SOLUTION INTRAVENOUS at 03:30

## 2024-12-18 RX ADMIN — DONEPEZIL HYDROCHLORIDE 10 MG: 10 TABLET, FILM COATED ORAL at 21:10

## 2024-12-18 RX ADMIN — ZINC OXIDE 1 APPLICATION: 200 OINTMENT TOPICAL at 18:48

## 2024-12-18 RX ADMIN — SODIUM CHLORIDE, POTASSIUM CHLORIDE, SODIUM LACTATE AND CALCIUM CHLORIDE 125 ML/HR: 600; 310; 30; 20 INJECTION, SOLUTION INTRAVENOUS at 06:22

## 2024-12-18 RX ADMIN — SODIUM CHLORIDE 1000 ML: 9 INJECTION, SOLUTION INTRAVENOUS at 01:05

## 2024-12-18 RX ADMIN — CARVEDILOL 3.12 MG: 3.12 TABLET, FILM COATED ORAL at 18:48

## 2024-12-18 RX ADMIN — Medication 0.4 MG: at 06:48

## 2024-12-18 RX ADMIN — Medication 10 ML: at 21:10

## 2024-12-18 RX ADMIN — CEFTRIAXONE SODIUM 2000 MG: 2 INJECTION, POWDER, FOR SOLUTION INTRAMUSCULAR; INTRAVENOUS at 02:08

## 2024-12-18 RX ADMIN — QUETIAPINE FUMARATE 25 MG: 25 TABLET ORAL at 21:10

## 2024-12-18 RX ADMIN — SODIUM CHLORIDE 1000 MG: 900 INJECTION INTRAVENOUS at 21:09

## 2024-12-18 RX ADMIN — NALOXONE HYDROCHLORIDE 0.4 MG: 0.4 INJECTION, SOLUTION INTRAMUSCULAR; INTRAVENOUS; SUBCUTANEOUS at 06:48

## 2024-12-18 RX ADMIN — ENOXAPARIN SODIUM 30 MG: 100 INJECTION SUBCUTANEOUS at 14:56

## 2024-12-18 RX ADMIN — SODIUM CHLORIDE 100 ML/HR: 9 INJECTION, SOLUTION INTRAVENOUS at 08:43

## 2024-12-18 RX ADMIN — Medication 10 ML: at 10:37

## 2024-12-18 RX ADMIN — LORAZEPAM 0.5 MG: 2 INJECTION INTRAMUSCULAR; INTRAVENOUS at 01:05

## 2024-12-18 NOTE — NURSING NOTE
This pt since arrival has minimal response. She cannot follow commands, neuro checks are positive, pupils do not react. She cannot follow my sound. When I say Jennifer she mumbles. I am not sure if you are aware, ER found a bottle of pills in her vagina. Pharmacy called and identified it is narcotics Sara IR 15mg. The medication name on the bottle is not the same. She is really pale and does not look well at all.     [id number] Dr Jimenez at BS. Pt only responds to name with mumbles. Narcan 0.4 mg given IV. Responded to name with mumbles.
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
## 2024-12-18 NOTE — ED NOTES
Pharmacy called regarding pill bottle found on patient. Brandon, pharmacist, to send pharm tech to retrieve bottle.

## 2024-12-18 NOTE — PLAN OF CARE
Goal Outcome Evaluation:  Plan of Care Reviewed With: patient           Outcome Evaluation: Pt is alone in the room, sleeping and confused. Nutrition history could not be obtained. Pt presents with several wounds, including a pressure injury (PI) on the bilateral coccyx, as well as wounds on the left anterior greater trochanter, nose, and left breast. SLP is following the pt and noted fatigue and confusion. SLP recommends a pureed diet with thin liquids and feeding assistance. According to previous dietitian notes, pt drank Boost daily at home. While inpatient, pt will receive Magic Cup BID, as it can be fed with assistance more easily. Per previous recorded weights, pt was 175 lb on 10/05/24 (bedscale); admission weight is 144 lb (an 18% weight loss in two months). The dietitian will follow up to gather more information. Will monitor.

## 2024-12-18 NOTE — PLAN OF CARE
Goal Outcome Evaluation:  Plan of Care Reviewed With: patient           Outcome Evaluation: Pt is alone in the room, sleeping and confused. Nutrition history could not be obtained. Pt presents with several wounds, including a PI on the bilateral coccyx, as well as wounds on the left anterior greater trochanter, nose, and left breast. SLP is following the pt and noted fatigue and confusion. SLP recommends a pureed diet with thin liquids and feeding assistance. According to previous dietitian notes, pt drank Boost daily at home. While inpatient, pt will receive Magic Cup BID, as it can be fed with assistance more easily. Per previous recorded weights, pt was 175 lb on 10/05/24 (bedscale); admission weight is 144 lb (an 18% weight loss in two months). The dietitian will follow up to gather more information. Will monitor.

## 2024-12-18 NOTE — ED NOTES
Upon triage, patient perineal area found to be extremely excoriated. Pictures located on chart. Patient brief saturated with urine, strong unpleasant smell, and discolored. Brief saturated through into pants. Pants also discolored to brown hue with strong odor. Patient perineal area cleansed, barrier cream applied, along with fresh brief. Small pressure area to right side of nose. Pictures also placed in chart. Area under bilateral breast yeast, reddened, and raw. Pictures placed. No other areas of skin breakdown apparent.

## 2024-12-18 NOTE — CASE MANAGEMENT/SOCIAL WORK
Discharge Planning Assessment  [facility]     Patient Name: Jennifer Gomes  MRN: [id number]  Today's Date: 12/18/2024    Admit Date: 12/18/2024        Discharge Needs Assessment       Row Name 12/18/24 1241       Living Environment    People in Home child(sebastian), adult    Current Living Arrangements home    Potentially Unsafe Housing Conditions none    Primary Care Provided by child(sebastian)    Provides Primary Care For no one, unable/limited ability to care for self    Family Caregiver if Needed child(sebastian), adult    Quality of Family Relationships helpful;involved;supportive    Able to Return to Prior Arrangements no       Resource/Environmental Concerns    Resource/Environmental Concerns none    Environment Concerns none    Financial Concerns none    Transportation Concerns none       Transition Planning    Patient/Family Anticipates Transition to inpatient rehabilitation facility    Patient/Family Anticipated Services at Transition none    Transportation Anticipated family or friend will provide       Discharge Needs Assessment    Readmission Within the Last 30 Days previous discharge plan unsuccessful    Equipment Currently Used at Home none    Anticipated Changes Related to Illness none    Equipment Needed After Discharge none    Outpatient/Agency/Support Group Needs skilled nursing facility    Discharge Facility/Level of Care Needs nursing facility, skilled    Discharge Coordination/Progress PT resides at home with daughter and has had a recent stay at [facility]. PT's insurance stopped paying and PT was dc home. PT's daughter has requested that PT be listed with [facility] again. SW has listed, will await bed offer or denial. PT will require pre-cert befor egoing to SNF.                   Discharge Plan    No documentation.                 Continued Care and Services - Admitted Since 12/18/2024    No active coordination exists for this encounter.       Selected Continued Care - Prior Encounters Includes continued care  and service providers with selected services from prior encounters from 9/19/2024 to 12/18/2024      Discharged on 11/18/2024 Admission date: 11/12/2024 - Discharge disposition: Skilled Nursing Facility (DC - External)      Destination       Service Provider Services Address Phone Fax Patient Preferred    [facility] [address] [place] [id number] [phone] --                      Discharged on 10/15/2024 Admission date: 10/5/2024 - Discharge disposition: Skilled Nursing Facility (DC - External)      Destination       Service Provider Services Address Phone Fax Patient Preferred    [facility] [address] [place] [address] [id number] [phone] --                             Demographic Summary    No documentation.                  Functional Status    No documentation.                  Psychosocial    No documentation.                  Abuse/Neglect    No documentation.                  Legal    No documentation.                  Substance Abuse    No documentation.                  Patient Forms    No documentation.                     BRENT Huston

## 2024-12-18 NOTE — NURSING NOTE
This pt since arrival has minimal response. She cannot follow commands, neuro checks are positive, pupils do not react. She cannot follow my sound. When I say Jennifer she mumbles. I am not sure if you are aware, ER found a bottle of pills in her vagina. Pharmacy called and identified it is narcotics Sara IR 15mg. The medication name on the bottle is not the same. She is really pale and does not look well at all.     0645 Dr Jimenez was at BS. Narcan 0.4mg IV. Pt became arousable by name and moving all extremities. Cont to monitor

## 2024-12-18 NOTE — CONSULTS
[facility]  INPATIENT WOUND & OSTOMY CONSULTATION    Today's Date: 24    Patient Name: Jennifer Gomes  MRN: [id number]  CSN: [id number]  PCP: Moiz Schwartz DO  Referring Provider:   Consulting Provider (From admission, onward)      Start Ordered     Status Ordering Provider    24 0755 24 0756  Inpatient Wound Care MD Consult  Once        Specialty:  Wound Care  Provider:  Morenita Paredes APRN    Acknowledged BATSHEVA MATIAS           Attending Provider: Batsheva Matias MD  Length of Stay: 0    SUBJECTIVE   Chief Complaint: ***    HPI: Jennifer Gomes, a 84 y.o.female, presents with a past medical history of ***.  A full past medical history is listed below.  Inpatient wound care consulted due to ***      Visit Dx:    ICD-10-CM ICD-9-CM   1. TOMÁS (acute kidney injury)  N17.9 584.9   2. Acute UTI  N39.0 599.0   3. Sepsis, due to unspecified organism, unspecified whether acute organ dysfunction present  A41.9 038.9     995.91   4. Pressure injury of skin, unspecified injury stage, unspecified location  L89.90 707.00     707.20   5. Candidal intertrigo  B37.2 112.3   6. Altered mental status, unspecified altered mental status type  R41.82 780.97   7. Dysphagia, unspecified type [R13.10]  R13.10 787.20       Hospital Problem List:     Metabolic encephalopathy    Acute renal failure superimposed on stage 3 chronic kidney disease    Essential hypertension    Dementia    Acute cystitis      History:   Past Medical History:   Diagnosis Date    Anxiety     Arthritis     Bronchitis     Cancer     uterine    Chronic pain     Depression     Disease of thyroid gland     Fibromyalgia     GERD (gastroesophageal reflux disease)     Headache     History of transfusion     AS     Hyperlipidemia     Hypertension     Incontinence     Insomnia     Leg pain     Lumbar stenosis     Migraines     Peptic ulcer     Restless legs     Sleep apnea     NO C-PAP    UTI (urinary tract  infection)     Vaginal bleeding      Past Surgical History:   Procedure Laterality Date    BLADDER REPAIR  2011    MESH HAD TO BE REMOVED IN 2013    BREAST BIOPSY Right 2017    benign    BREAST CYST EXCISION Left 1982    CARDIAC CATHETERIZATION      CARPAL TUNNEL RELEASE      CATARACT EXTRACTION W/ INTRAOCULAR LENS  IMPLANT, BILATERAL      CHOLECYSTECTOMY WITH INTRAOPERATIVE CHOLANGIOGRAM N/A 9/7/2023    Procedure: CHOLECYSTECTOMY LAPAROSCOPIC INTRAOPERATIVE CHOLANGIOGRAM;  Surgeon: Gerardo Arciniega MD;  Location:  [facility] OR;  Service: General;  Laterality: N/A;    COLONOSCOPY      COLONOSCOPY N/A 10/01/2021    Procedure: COLONOSCOPY WITH ANESTHESIA;  Surgeon: Tom Velasco DO;  Location:  [facility];  Service: Gastroenterology;  Laterality: N/A;  pre: change in bowel habits  post: diverticulosis. hemorrhoids.   Olivia Mora APRN        CYSTECTOMY      D & C HYSTEROSCOPY N/A 11/06/2017    Procedure: DILATATION AND CURETTAGE HYSTEROSCOPY;  Surgeon: Shasta Madrigal MD;  [facility];  Service:     DILATION AND CURETTAGE, DIAGNOSTIC / THERAPEUTIC  2008    ENDOSCOPY  09/23/2010    Short segment of Arriola's,Moderate chroninc esophagogastritis and negative H.pylori    ENDOSCOPY N/A 09/25/2017    Procedure: ESOPHAGOGASTRODUODENOSCOPY WITH ANESTHESIA;  Surgeon: Tom Velasco DO;  Location:  [facility];  Service:     EYE SURGERY      RETINA    HEMORRHOIDECTOMY SIGMOIDOSCOPY N/A 3/21/2023    Procedure: HEMORRHOIDECTOMY WITH EXAM UNDER ANESTHESIA;  Surgeon: Holly Chavez MD;  Location: [facility] OR;  Service: General;  Laterality: N/A;    HYSTERECTOMY  12/20/2017    ORIF TIBIA/FIBULA FRACTURES Left 2000    TRANSVAGINAL TAPING SUSPENSION N/A 11/06/2017    Procedure: VAGINAL MESH REVISION;  Surgeon: Shasta Madrigal MD;  Location:  [facility] OR;  Service:     VAGINAL MESH REVISION  2013     Social History     Socioeconomic History    Marital status:    Tobacco Use    Smoking status: Never     Smokeless tobacco: Never   Vaping Use    Vaping status: Never Used   Substance and Sexual Activity    Alcohol use: Not Currently     Comment: occasional    Drug use: No    Sexual activity: Defer     Family History   Problem Relation Age of Onset    Diabetes Mother     Multiple myeloma Mother     Stroke Father     Diabetes Sister     Prostate cancer Brother     Lymphoma Brother         NHL    Ovarian cancer Paternal Aunt     Cancer Paternal Grandmother         metastatic    Lung cancer Paternal Grandfather     Colon cancer Neg Hx     Esophageal cancer Neg Hx     Breast cancer Neg Hx        Allergies:  Allergies   Allergen Reactions    Ropinirole Hcl Shortness Of Breath    Codeine Itching and Mental Status Change    Definity [Perflutren Lipid Microsphere] Other (See Comments)     Severe back pain    Ambien [Zolpidem] Other (See Comments)     HYPER     Eszopiclone Other (See Comments)     MAKES PT HYPER     Pregabalin Dizziness    Tizanidine Other (See Comments)     Terrible nightmares       Medications:    Current Facility-Administered Medications:     acetaminophen (TYLENOL) tablet 650 mg, 650 mg, Oral, Q6H PRN, Antonino Braden MD    atorvastatin (LIPITOR) tablet 40 mg, 40 mg, Oral, Daily, Antonino Braden MD    carvedilol (COREG) tablet 3.125 mg, 3.125 mg, Oral, BID With Meals, Antonino Braden MD    cefTRIAXone (ROCEPHIN) 1,000 mg in sodium chloride 0.9 % 100 mL MBP, 1,000 mg, Intravenous, Q24H, Antonino Braden MD    donepezil (ARICEPT) tablet 10 mg, 10 mg, Oral, Nightly, Antonino Braden MD    Enoxaparin Sodium (LOVENOX) syringe 30 mg, 30 mg, Subcutaneous, Daily, Antonino Braden MD    levothyroxine (SYNTHROID, LEVOTHROID) tablet 50 mcg, 50 mcg, Oral, Q AM, Antonino Braden MD    nitroglycerin (NITROSTAT) SL tablet 0.4 mg, 0.4 mg, Sublingual, Q5 Min PRN, Antonino Braden MD    pantoprazole (PROTONIX) EC tablet 40 mg, 40 mg, Oral, Q AM, Antonino Braden MD    QUEtiapine (SEROquel) tablet 25 mg, 25 mg,  Oral, Nightly, Antonino Braden MD    sertraline (ZOLOFT) tablet 25 mg, 25 mg, Oral, Daily, Antonino Braden MD    [COMPLETED] Insert Peripheral IV, , , Once **AND** sodium chloride 0.9 % flush 10 mL, 10 mL, Intravenous, PRN, Antonino Braden MD    sodium chloride 0.9 % flush 10 mL, 10 mL, Intravenous, Q12H, Antonino Braden MD, 10 mL at 12/18/24 1037    sodium chloride 0.9 % flush 10 mL, 10 mL, Intravenous, PRN, Antonino Braden MD    sodium chloride 0.9 % infusion 40 mL, 40 mL, Intravenous, PRN, Antonino Braden MD    sodium chloride 0.9 % infusion, 100 mL/hr, Intravenous, Continuous, Antonino Braden MD, Last Rate: 100 mL/hr at 12/18/24 0843, 100 mL/hr at 12/18/24 0843    OBJECTIVE     Vitals:    12/18/24 1116   BP: 104/44   Pulse: 78   Resp: 20   Temp: 97.5 °F (36.4 °C)   SpO2: 98%       PHYSICAL EXAM: ***  Physical Exam       Results Review:  Lab Results (last 48 hours)       Procedure Component Value Units Date/Time    Basic Metabolic Panel [[id number]]  (Abnormal) Collected: 12/18/24 1242    Specimen: Blood Updated: 12/18/24 1320     Glucose 82 mg/dL      BUN 49 mg/dL      Creatinine 2.51 mg/dL      Sodium 141 mmol/L      Potassium 3.2 mmol/L      Comment: Slight hemolysis detected by analyzer. Result may be falsely elevated.        Chloride 111 mmol/L      CO2 15.0 mmol/L      Calcium 8.2 mg/dL      BUN/Creatinine Ratio 19.5     Anion Gap 15.0 mmol/L      eGFR 18.7 mL/min/1.73     Narrative:      GFR Categories in Chronic Kidney Disease (CKD)      GFR Category          GFR (mL/min/1.73)    Interpretation  G1                     90 or greater         Normal or high (1)  G2                      60-89                Mild decrease (1)  G3a                   45-59                Mild to moderate decrease  G3b                   30-44                Moderate to severe decrease  G4                    15-29                Severe decrease  G5                    14 or less           Kidney failure          (1)In  the absence of evidence of kidney disease, neither GFR category G1 or G2 fulfill the criteria for CKD.    eGFR calculation 2021 CKD-EPI creatinine equation, which does not include race as a factor    Blood Culture - Blood, Arm, Right [[id number]] Collected: 12/18/24 0102    Specimen: Blood from Arm, Right Updated: 12/18/24 0212    Blood Culture - Blood, Arm, Right [[id number]] Collected: 12/18/24 0110    Specimen: Blood from Arm, Right Updated: 12/18/24 0212    Urinalysis With Culture If Indicated - Straight Cath [[id number]]  (Abnormal) Collected: 12/18/24 0102    Specimen: Urine from Straight Cath Updated: 12/18/24 0142     Color, UA Yellow     Appearance, UA Turbid     pH, UA 5.5     Specific Gravity, UA 1.019     Glucose, UA Negative     Ketones, UA Negative     Bilirubin, UA Negative     Blood, UA Trace     Protein, UA 30 mg/dL (1+)     Leuk Esterase, UA Large (3+)     Nitrite, UA Positive     Urobilinogen, UA 1.0 E.U./dL    Narrative:      In absence of clinical symptoms, the presence of pyuria, bacteria, and/or nitrites on the urinalysis result does not correlate with infection.    Urinalysis, Microscopic Only - Straight Cath [[id number]]  (Abnormal) Collected: 12/18/24 0102    Specimen: Urine from Straight Cath Updated: 12/18/24 0142     RBC, UA 6-10 /HPF      WBC, UA Too Numerous to Count /HPF      Bacteria, UA 4+ /HPF      Squamous Epithelial Cells, UA 0-2 /HPF      Hyaline Casts, UA 0-2 /LPF      Methodology Automated Microscopy    Urine Culture - Urine, Straight Cath [[id number]] Collected: 12/18/24 0102    Specimen: Urine from Straight Cath Updated: 12/18/24 0142    Lactic Acid, Plasma [[id number]]  (Abnormal) Collected: 12/18/24 0102    Specimen: Blood Updated: 12/18/24 0135     Lactate 2.7 mmol/L     COVID PRE-OP / PRE-PROCEDURE SCREENING ORDER (NO ISOLATION) - Swab, Nasal Cavity [[id number]]  (Normal) Collected: 12/18/24 0102    Specimen: Swab from Nasal Cavity Updated: 12/18/24 0132    Narrative:      The  following orders were created for panel order COVID PRE-OP / PRE-PROCEDURE SCREENING ORDER (NO ISOLATION) - Swab, Nasal Cavity.  Procedure                               Abnormality         Status                     ---------                               -----------         ------                     COVID-19 and FLU A/B PCR...[[id number]]  Normal              Final result                 Please view results for these tests on the individual orders.    COVID-19 and FLU A/B PCR, 1 HR TAT - Swab, Nasopharynx [[id number]]  (Normal) Collected: 12/18/24 0102    Specimen: Swab from Nasopharynx Updated: 12/18/24 0132     COVID19 Not Detected     Influenza A PCR Not Detected     Influenza B PCR Not Detected    Narrative:      Fact sheet for providers: https://www.fda.gov/media/292392/download    Fact sheet for patients: https://www.fda.gov/media/147243/download    Test performed by PCR.    Comprehensive Metabolic Panel [[id number]]  (Abnormal) Collected: 12/18/24 0102    Specimen: Blood Updated: 12/18/24 0127     Glucose 119 mg/dL      BUN 53 mg/dL      Creatinine 2.95 mg/dL      Sodium 138 mmol/L      Potassium 3.4 mmol/L      Comment: Slight hemolysis detected by analyzer. Result may be falsely elevated.        Chloride 103 mmol/L      CO2 15.0 mmol/L      Calcium 9.4 mg/dL      Total Protein 7.8 g/dL      Albumin 3.8 g/dL      ALT (SGPT) 5 U/L      AST (SGOT) 14 U/L      Alkaline Phosphatase 89 U/L      Total Bilirubin 0.2 mg/dL      Globulin 4.0 gm/dL      A/G Ratio 1.0 g/dL      BUN/Creatinine Ratio 18.0     Anion Gap 20.0 mmol/L      eGFR 15.4 mL/min/1.73     Narrative:      GFR Categories in Chronic Kidney Disease (CKD)      GFR Category          GFR (mL/min/1.73)    Interpretation  G1                     90 or greater         Normal or high (1)  G2                      60-89                Mild decrease (1)  G3a                   45-59                Mild to moderate decrease  G3b                   30-44                 Moderate to severe decrease  G4                    15-29                Severe decrease  G5                    14 or less           Kidney failure          (1)In the absence of evidence of kidney disease, neither GFR category G1 or G2 fulfill the criteria for CKD.    eGFR calculation 2021 CKD-EPI creatinine equation, which does not include race as a factor    Magnesium [[id number]]  (Normal) Collected: 12/18/24 0102    Specimen: Blood Updated: 12/18/24 0127     Magnesium 2.0 mg/dL     Lipase [[id number]]  (Normal) Collected: 12/18/24 0102    Specimen: Blood Updated: 12/18/24 0123     Lipase 20 U/L     Protime-INR [[id number]]  (Abnormal) Collected: 12/18/24 0102    Specimen: Blood Updated: 12/18/24 0121     Protime 26.8 Seconds      INR 2.36    aPTT [[id number]]  (Abnormal) Collected: 12/18/24 0102    Specimen: Blood Updated: 12/18/24 0121     PTT 38.9 seconds     CBC & Differential [[id number]]  (Abnormal) Collected: 12/18/24 0102    Specimen: Blood Updated: 12/18/24 0111    Narrative:      The following orders were created for panel order CBC & Differential.  Procedure                               Abnormality         Status                     ---------                               -----------         ------                     CBC Auto Differential[[id number]]        Abnormal            Final result                 Please view results for these tests on the individual orders.    CBC Auto Differential [[id number]]  (Abnormal) Collected: 12/18/24 0102    Specimen: Blood Updated: 12/18/24 0111     WBC 9.85 10*3/mm3      RBC 3.57 10*6/mm3      Hemoglobin 10.4 g/dL      Hematocrit 32.6 %      MCV 91.3 fL      MCH 29.1 pg      MCHC 31.9 g/dL      RDW 15.5 %      RDW-SD 50.9 fl      MPV 10.9 fL      Platelets 260 10*3/mm3      Neutrophil % 73.8 %      Lymphocyte % 19.1 %      Monocyte % 6.1 %      Eosinophil % 0.3 %      Basophil % 0.3 %      Immature Grans % 0.4 %      Neutrophils, Absolute 7.27 10*3/mm3      Lymphocytes,  Absolute 1.88 10*3/mm3      Monocytes, Absolute 0.60 10*3/mm3      Eosinophils, Absolute 0.03 10*3/mm3      Basophils, Absolute 0.03 10*3/mm3      Immature Grans, Absolute 0.04 10*3/mm3      nRBC 0.0 /100 WBC           Imaging Results (Last 72 Hours)       Procedure Component Value Units Date/Time    US Renal Bilateral [[id number]] Collected: 12/18/24 1223     Updated: 12/18/24 1316    Addenda:        ADDENDUM: It is noted that the technologist scanned the kidneys outside  of our normal protocol scanning the left kidney first followed by the  right kidney. Therefore, the measurements of the kidneys are reversed.  The left kidney measures 10.0 cm in ghbc-ln-yklx length and the right  kidney 8.9 cm in lhml-hu-fiiu length. Otherwise no change from the  previous exam.     This report was signed and finalized on 12/18/2024 1:13 PM by Dr. Chi Hinds MD.     Signed: 12/18/24 1313 by Chi Hinds MD    Narrative:      RENAL ULTRASOUND COMPLETE 12/18/2024 10:57 AM     REASON FOR EXAM: prior hydronephrosis, recurrent TOMÁS; N17.9-Acute kidney  failure, unspecified; N39.0-Urinary tract infection, site not specified;  A41.9-Sepsis, unspecified organism; L89.90-Pressure ulcer of unspecified  site, unspecified stage; B37.2-Candidiasis of skin and nail;  R41.82-Altered mental status, unspecified; R13.10-Dysphagia, unspecified        COMPARISON: 11/15/2024     TECHNIQUE: Multiple longitudinal and transverse realtime sonographic  images of the kidneys and urinary bladder are obtained.  Images and  report are stored in PACS per institutional and state regulations.     FINDINGS:     RIGHT KIDNEY: 10.0 cm. Normal in size, shape, contour and position. No  solid or cystic masses. The central echo complex is compact with no  evidence for hydronephrosis. No nephrolithiasis or abnormal perinephric  fluid collections . No hydroureter.     LEFT KIDNEY: 8.9 cm. Normal in size, shape, contour and position. No  solid or cystic  masses. The central echo complex is compact with no  evidence for hydronephrosis. No nephrolithiasis or abnormal perinephric  fluid collections . No hydroureter.     PELVIS: There is some debris within the bladder but with no discrete  mass. The urinary bladder is otherwise unremarkable. There is no  surrounding ascites.       Impression:      1. Debris within the bladder without evidence of discrete bladder mass  or bladder wall thickening.  2. Normal sonogram of the kidneys. No mass or hydronephrosis..           This report was signed and finalized on 12/18/2024 12:25 PM by Dr. Chi Hinds MD.       CT Cervical Spine Without Contrast [[id number]] Collected: 12/18/24 0609     Updated: 12/18/24 0718    Narrative:      EXAMINATION: CT CERVICAL SPINE WO CONTRAST-      12/18/2024 12:01 AM     HISTORY: fall; N17.9-Acute kidney failure, unspecified; N39.0-Urinary  tract infection, site not specified; A41.9-Sepsis, unspecified organism;  L89.90-Pressure ulcer of unspecified site, unspecified stage;  B37.2-Candidiasis of skin and nail; R41.82-Altered mental status,  unspecified     In order to have a CT radiation dose as low as reasonably achievable  Automated Exposure Control was utilized for adjustment of the mA and/or  KV according to patient size.     Total DLP = 331.85 mGy.cm     The CT scan of the cervical spine is performed with delved intravenous  or intrathecal contrast enhancement.     Images are acquired in axial plane and subsequent reconstruction in  coronal and sagittal planes.     Comparison is made with the previous study dated 9/4/2023.     There is no evidence of or compression.     No acute displacement or malalignment.     There is mild retrolisthesis of C5 over C6.     The curvature is maintained.     There is severe arthropathy of the atlantodental articulation with  complete obliteration of the atlantodental space.     There are chronic degenerative changes in the form of anterior and  posterior  osteophytes, uncinate spurs and facet arthropathy. There is a  resultant mild narrowing of the left neuroforamina C2-3 and C3-4,  bilateral neuroforaminal stenosis at C4-5 C5-6 and C6-7. There is mild  spinal stenosis at C5-6.     Limited visualized prevertebral soft tissues are unremarkable. Thyroid  gland is incompletely visualized and not well evaluated.     Limited visualized lung apices are unremarkable.       Impression:      1. No acute fracture or malalignment.  2. Cervical spondylosis. Mild retrolisthesis C5 over C6.  3. Multilevel prominent disc osteophyte complexes, uncinate spurs and  facet arthropathy and resultant neural foraminal and spinal canal  stenoses are detailed above.     The above study was initially reviewed and reported by [employer]. I do not  find any discrepancies.                                            This report was signed and finalized on 12/18/2024 7:14 AM by Dr. Marty Suresh MD.       XR Chest 1 View [[id number]] Collected: 12/18/24 0649     Updated: 12/18/24 0653    Narrative:      EXAMINATION: XR CHEST 1 VW-     12/18/2024 12:35 AM     HISTORY: altered mentation; N17.9-Acute kidney failure, unspecified;  N39.0-Urinary tract infection, site not specified; A41.9-Sepsis,  unspecified organism; L89.90-Pressure ulcer of unspecified site,  unspecified stage; B37.2-Candidiasis of skin and nail; R41.82-Altered  mental status, unspecified     A frontal projection of the chest is compared with the previous study  dated 10/5/2024.     The lungs are moderately expanded, significantly improved since the  previous study.     The right lung apex is obscured by the bony and soft tissue structures  of the mandible.     There is no evidence of recent infiltrate, pleural effusion, pulmonary  congestion or pneumothorax.     Heart size is in the normal range. Atheromatous changes of the tortuous  thoracic aorta is noted.     There is deformity of the right distal clavicle representing  previous  healed fracture. There is no acute bony abnormality.       Impression:      1. No active cardiopulmonary disease.        This report was signed and finalized on 12/18/2024 6:50 AM by Dr. Marty Suresh MD.       CT Head Without Contrast [[id number]] Collected: 12/18/24 0603     Updated: 12/18/24 0611    Narrative:      EXAMINATION: CT HEAD WO CONTRAST-      12/18/2024 12:01 AM     HISTORY: altered mentation/fall; N17.9-Acute kidney failure,  unspecified; N39.0-Urinary tract infection, site not specified;  A41.9-Sepsis, unspecified organism; L89.90-Pressure ulcer of unspecified  site, unspecified stage; B37.2-Candidiasis of skin and nail;  R41.82-Altered mental status, unspecified     In order to have a CT radiation dose as low as reasonably achievable  Automated Exposure Control was utilized for adjustment of the mA and/or  KV according to patient size.     Total DLP = 852.88 mGy.cm     The CT scan of the head is performed without intravenous contrast  enhancement.     The images are acquired in axial plane and subsequent reconstruction in  coronal and sagittal planes.     Comparison is made with the previous study dated 10/5/2024.     There are significant motion artifacts which lowers the diagnostic  quality of the study. A subtle lesion may not be evaluated or excluded.     There is no evidence of a mass. There is no midline shift.     There is no evidence of intracranial hemorrhage or hematoma.     There is moderate dilatation of the ventricles, basal cisterns and the  cortical sulci suggestive chronic volume loss, similar to the previous  study.     Scattered areas of chronic white matter ischemia bilaterally noted. The  gray-white matter differentiation is maintained.     Very limited visualized posterior fossa structures are grossly  unremarkable.     Images reviewed in bone window show no acute displaced or depressed  skull fracture. A subtle nondisplaced fracture may be obscured due  to  significant motion artifact. There is opacification of the right  maxillary antrum similar to the previous study representing chronic  inflammatory process. Mastoid air cells are clear.       Impression:      1. Significant motion artifacts may obscure a subtle intracranial  abnormality or lesion. No obvious/visible acute intracranial  abnormality. If clinically warranted further evaluation with repeat CT  scan of the head or MR imaging of the brain when the patient is able to  cooperate.  2. Chronic ischemic and atrophic changes.     The above study was initially reviewed and reported by [employer]. I do not  find any discrepancies.                                            This report was signed and finalized on 12/18/2024 6:08 AM by Dr. Marty Suresh MD.                  ASSESSMENT/PLAN       Examination and evaluation of wound(s) was performed.    DIAGNOSIS:   Incontinence associated dermatitis  Candidal intertrigo  Impaired mobility  Bowel and bladder incontinence    HOSPITAL PROBLEM LIST:     Metabolic encephalopathy    Acute renal failure superimposed on stage 3 chronic kidney disease    Essential hypertension    Dementia    Acute cystitis      PLAN:   Orders placed for wound care and pressure/moisture management as listed below.       Start     Ordered    12/18/24 1800  Skin Care  2 Times Daily      Question Answer Comment   Wound Locations Bilateral breasts, buttocks and perineal area    Wound Care Instructions Clean with no rinse cleansing foam. Pat dry. Apply magic barrier cream.  Use pillowcases under breasts and change often to maintain dry environment.        12/18/24 1343    12/18/24 1343  Turn Patient  Now Then Every 2 Hours         12/18/24 1343    12/18/24 1343  Head of Bed 30 Degrees or Less (Unless Contraindicated)  Until Discontinued         12/18/24 1343    12/18/24 1343  Elevate Heels Off of Bed  Until Discontinued         12/18/24 1343    12/18/24 1343  Use Seat Cushion When Up In  Chair  Continuous         12/18/24 1343    12/18/24 1343  Silicone Border Dressing to Bony Prominences  Every Shift      Comments: Apply silicone border foam dressing per protocol to bilateral heels for protection.  Nursing to change dressing every 3 days and PRN if soiled. Nursing is to peel back dressing with every assessment to assess skin underneath dressing. No barrier cream under dressing.    12/18/24 1343    12/18/24 1343  Do NOT Rub or Massage Any Bony Prominence  Continuous         12/18/24 1343    12/18/24 1343  Use Repositioning Wedge to Position Patient  Continuous        Comments: Use Comfort Glide repositioning sheet and wedges to position patient.    12/18/24 1343    12/18/24 1343  Apply Waffle mattress overlay to bed Until Discontinued  Until Discontinued        Question:  Supply to be applied:  Answer:  Waffle mattress overlay to bed    12/18/24 1343    Unscheduled  Wound Care  As Needed      Question:  Wound Care Instructions  Answer:  Apply Moisture Barrier After Any Incontinence    12/18/24 1343                     Discussed findings and treatment plan including risks, benefits, and treatment options with *** in detail. Patient agreed with treatment plan.      This document has been electronically signed by MORGAN Leblanc on 12/18/2024 13:40 CST

## 2024-12-18 NOTE — PROGRESS NOTES
Patient seen and examined after midnight by Dr. Braden.    Ms. Gomes is an 84-year-old female that presented to [facility] on 12/18 with increased confusion and fall.  She was admitted to our facility on 10/5 to 10/15/2024 for sepsis with bacteremia due to UTI, cardiopulmonary arrest and acute kidney injury.  She was again hospitalized on 11/12 to 11/18/2024 with acute kidney injury secondary to bilateral hydronephrosis.  She was discharged to [facility].  She was seen in follow-up by urology on 12/2 -patient was adamant that she wanted indwelling Smith catheter to be removed.  Smith catheter discontinued.  History of present illness is very limited as patient is confused, reportedly was discharged from nursing home approximately 3 days ago.  She was living with her daughter.  EMS was called due to increased confused and had a fall at home. In the ED, she was found to have acute kidney injury with creatinine 2.95.  Urinalysis concerning for UTI-she was given Rocephin.  CT head showed no obvious/visible acute intracranial normality.  Chest x-ray showed no acute findings.  Flu/COVID-negative.    Treatment Plan  Acute kidney injury with creatinine 2.95.  During recent hospitalization she had acute kidney injury secondary to bilateral hydronephrosis.  Creatinine improved to 1.2 at time of discharge.  She was seen in follow-up by urology on 12/2 -patient was adamant that she wanted indwelling Smith catheter to be removed.  IRenal ultrasound. Insert indwelling catheter.  Repeat BMP this afternoon.    Urinalysis concerning for UTI.  Recent culture positive for Morganella Morganii.  Follow blood and urine.  Continue ceftriaxone.    PT/OT/SLP.    Lovenox for VTE prophylaxis.    Electronically signed by MORGAN Ho, 12/18/24, 10:24 AM CST.

## 2024-12-18 NOTE — H&P
[facility] Medicine Services  HISTORY AND PHYSICAL    Date of Admission: 2024  Primary Care Physician: Moiz Schwartz DO Subjective   Primary Historian: Chart     Chief Complaint: Altered mental status    History of Present Illness  This is a 84-year-old lady with past medical history significant for hypertension hyperlipidemia depression fibromyalgia who was recently discharged from a nursing home 3 days ago who was brought into the emergency department earlier tonight by EMS with increased confusion and also sustaining a fall today.  According to records she was reported that she was found to be in soiled briefs upon arrival to the ER she was anxious and confused.        Review of Systems   Unavailable given that the patient is lethargic and does not follow any commands.    Past Medical History:   Past Medical History:   Diagnosis Date    Anxiety     Arthritis     Bronchitis     Cancer     uterine    Chronic pain     Depression     Disease of thyroid gland     Fibromyalgia     GERD (gastroesophageal reflux disease)     Headache     History of transfusion     AS     Hyperlipidemia     Hypertension     Incontinence     Insomnia     Leg pain     Lumbar stenosis     Migraines     Peptic ulcer     Restless legs     Sleep apnea     NO C-PAP    UTI (urinary tract infection)     Vaginal bleeding      Past Surgical History:  Past Surgical History:   Procedure Laterality Date    BLADDER REPAIR      MESH HAD TO BE REMOVED IN     BREAST BIOPSY Right 2017    benign    BREAST CYST EXCISION Left     CARDIAC CATHETERIZATION      CARPAL TUNNEL RELEASE      CATARACT EXTRACTION W/ INTRAOCULAR LENS  IMPLANT, BILATERAL      CHOLECYSTECTOMY WITH INTRAOPERATIVE CHOLANGIOGRAM N/A 2023    Procedure: CHOLECYSTECTOMY LAPAROSCOPIC INTRAOPERATIVE CHOLANGIOGRAM;  Surgeon: Gerardo Arciniega MD;  Location: [facility];  Service: General;  Laterality: N/A;    COLONOSCOPY       COLONOSCOPY N/A 10/01/2021    Procedure: COLONOSCOPY WITH ANESTHESIA;  Surgeon: Tom Velasco DO;  Location: [facility] ENDOSCOPY;  Service: Gastroenterology;  Laterality: N/A;  pre: change in bowel habits  post: diverticulosis. hemorrhoids.   Olivia Mora APRN        CYSTECTOMY      D & C HYSTEROSCOPY N/A 11/06/2017    Procedure: DILATATION AND CURETTAGE HYSTEROSCOPY;  Surgeon: Shasta Madrigal MD;  Location: [facility] OR;  Service:     DILATION AND CURETTAGE, DIAGNOSTIC / THERAPEUTIC  2008    ENDOSCOPY  09/23/2010    Short segment of Arriola's,Moderate chroninc esophagogastritis and negative H.pylori    ENDOSCOPY N/A 09/25/2017    Procedure: ESOPHAGOGASTRODUODENOSCOPY WITH ANESTHESIA;  Surgeon: Tom Velasco DO;  Location: [facility] ENDOSCOPY;  Service:     EYE SURGERY      RETINA    HEMORRHOIDECTOMY SIGMOIDOSCOPY N/A 3/21/2023    Procedure: HEMORRHOIDECTOMY WITH EXAM UNDER ANESTHESIA;  Surgeon: Holly Chavez MD;  Location: [facility] OR;  Service: General;  Laterality: N/A;    HYSTERECTOMY  12/20/2017    ORIF TIBIA/FIBULA FRACTURES Left 2000    TRANSVAGINAL TAPING SUSPENSION N/A 11/06/2017    Procedure: VAGINAL MESH REVISION;  Surgeon: Shasta Madrigal MD;  Location: [facility] OR;  Service:     VAGINAL MESH REVISION  2013     Social History:  reports that she has never smoked. She has never used smokeless tobacco. She reports that she does not currently use alcohol. She reports that she does not use drugs.    Family History: family history includes Cancer in her paternal grandmother; Diabetes in her mother and sister; Lung cancer in her paternal grandfather; Lymphoma in her brother; Multiple myeloma in her mother; Ovarian cancer in her paternal aunt; Prostate cancer in her brother; Stroke in her father.       Allergies:  Allergies   Allergen Reactions    Ropinirole Hcl Shortness Of Breath    Codeine Itching and Mental Status Change    Definity [Perflutren Lipid Microsphere] Other (See Comments)     Severe  back pain    Ambien [Zolpidem] Other (See Comments)     HYPER     Eszopiclone Other (See Comments)     MAKES PT HYPER     Pregabalin Dizziness    Tizanidine Other (See Comments)     Terrible nightmares       Medications:  Prior to Admission medications    Medication Sig Start Date End Date Taking? Authorizing Provider   acetaminophen (TYLENOL) 325 MG tablet Take 2 tablets by mouth Every 6 (Six) Hours As Needed for Mild Pain.    Tamiko Gaviria MD   amLODIPine (NORVASC) 5 MG tablet Take 1 tablet by mouth Daily. 10/15/24   Abdirahman Schroeder MD   atorvastatin (LIPITOR) 40 MG tablet Take 1 tablet by mouth Daily.    Tamiko Gaviria MD   bisacodyl (DULCOLAX) 10 MG suppository Insert 1 suppository into the rectum Daily As Needed for Constipation (Use if bisacodyl oral is ineffective). 11/18/24   Cuba Espitia MD   bisacodyl (DULCOLAX) 5 MG EC tablet Take 1 tablet by mouth Daily As Needed for Constipation (Use if polyethylene glycol is ineffective). 11/18/24   Cuba Espitia MD   carvedilol (COREG) 3.125 MG tablet Take 1 tablet by mouth 2 (Two) Times a Day With Meals for 30 days. 11/18/24 12/18/24  Cuba Espitia MD   donepezil (ARICEPT) 10 MG tablet Take 1 tablet by mouth Every Night. 10/18/22   Tamiko Gaviria MD   ergocalciferol (ERGOCALCIFEROL) 99584 units capsule Take 1 capsule by mouth 1 (One) Time Per Week. Saturday 4/17/19   Tamiko Gaviria MD   lansoprazole (PREVACID) 30 MG capsule Take 1 capsule by mouth Daily.    Tamiko Gaviria MD   levothyroxine (SYNTHROID, LEVOTHROID) 50 MCG tablet Take 1 tablet by mouth Daily.    Tamiko Gaviria MD   melatonin 5 MG tablet tablet Take 2 tablets by mouth At Night As Needed (insomnia) for up to 30 days. 11/18/24 12/18/24  Cuba Espitia MD   naloxone (NARCAN) 4 MG/0.1ML nasal spray Administer 1 spray into the nostril(s) as directed by provider As Needed for Opioid Reversal.    Tamiko Gaviria MD   oxyCODONE  "(ROXICODONE) 15 MG immediate release tablet TAKE 1 TABLET BY MOUTH FOUR TIMES DAILY. MUST LAST 30 DAYS 9/28/24   Tamiko Gaviria MD   polyethylene glycol 17 g packet Take 17 g by mouth Daily. 10/15/24   Abdirahman Schroeder MD   QUEtiapine (SEROquel) 25 MG tablet Take 1 tablet by mouth Every Night for 30 days. 11/18/24 12/18/24  Cuba Espitia MD sennopapo-docusate (PERICOLACE) 8.6-50 MG per tablet Take 2 tablets by mouth 2 (Two) Times a Day. 10/15/24   Abdirahman Schroeder MD   sennosides-docusate (PERICOLACE) 8.6-50 MG per tablet Take 2 tablets by mouth 2 (Two) Times a Day. 11/18/24   Cuba Espitia MD   sertraline (ZOLOFT) 25 MG tablet Take 1 tablet by mouth Daily.    Tamiko Gaviria MD   SUMAtriptan (IMITREX) 50 MG tablet Daily. 6/13/24   Tamiko Gaviria MD     I have utilized all available immediate resources to obtain, update, or review the patient's current medications (including all prescriptions, over-the-counter products, herbals, cannabis/cannabidiol products, and vitamin/mineral/dietary (nutritional) supplements).    Objective     Vital Signs: /42   Pulse 81   Temp 98 °F (36.7 °C) (Axillary)   Resp 20   Ht 157.5 cm (62\")   Wt 74.8 kg (165 lb)   LMP  (LMP Unknown)   SpO2 99%   BMI 30.18 kg/m²   Physical Exam   General: Patient is sleeping, difficult to arouse.  HEENT: Normocephalic, atraumatic, pupils are equal reactive to light and accommodate  Neck: Supple, no JVD, no carotid bruits  CVS: S1, S2, regular rhythm and rate  Lungs: Clear to auscultation bilaterally  Abdomen: Soft, nontender, nondistended bowel sounds are present  Extremities: No cyanosis, no clubbing, no edema pulses are intact  Neurologic: No focal deficits  Psychiatric: Unable to assess      Results Reviewed:  Lab Results (last 24 hours)       Procedure Component Value Units Date/Time    Blood Culture - Blood, Arm, Right [[id number]] Collected: 12/18/24 0102    Specimen: Blood from Arm, Right " Updated: 12/18/24 0212    Blood Culture - Blood, Arm, Right [[id number]] Collected: 12/18/24 0110    Specimen: Blood from Arm, Right Updated: 12/18/24 0212    Urinalysis With Culture If Indicated - Straight Cath [[id number]]  (Abnormal) Collected: 12/18/24 0102    Specimen: Urine from Straight Cath Updated: 12/18/24 0142     Color, UA Yellow     Appearance, UA Turbid     pH, UA 5.5     Specific Gravity, UA 1.019     Glucose, UA Negative     Ketones, UA Negative     Bilirubin, UA Negative     Blood, UA Trace     Protein, UA 30 mg/dL (1+)     Leuk Esterase, UA Large (3+)     Nitrite, UA Positive     Urobilinogen, UA 1.0 E.U./dL    Narrative:      In absence of clinical symptoms, the presence of pyuria, bacteria, and/or nitrites on the urinalysis result does not correlate with infection.    Urinalysis, Microscopic Only - Straight Cath [[id number]]  (Abnormal) Collected: 12/18/24 0102    Specimen: Urine from Straight Cath Updated: 12/18/24 0142     RBC, UA 6-10 /HPF      WBC, UA Too Numerous to Count /HPF      Bacteria, UA 4+ /HPF      Squamous Epithelial Cells, UA 0-2 /HPF      Hyaline Casts, UA 0-2 /LPF      Methodology Automated Microscopy    Urine Culture - Urine, Straight Cath [[id number]] Collected: 12/18/24 0102    Specimen: Urine from Straight Cath Updated: 12/18/24 0142    Lactic Acid, Plasma [[id number]]  (Abnormal) Collected: 12/18/24 0102    Specimen: Blood Updated: 12/18/24 0135     Lactate 2.7 mmol/L     COVID PRE-OP / PRE-PROCEDURE SCREENING ORDER (NO ISOLATION) - Swab, Nasal Cavity [[id number]]  (Normal) Collected: 12/18/24 0102    Specimen: Swab from Nasal Cavity Updated: 12/18/24 0132    Narrative:      The following orders were created for panel order COVID PRE-OP / PRE-PROCEDURE SCREENING ORDER (NO ISOLATION) - Swab, Nasal Cavity.  Procedure                               Abnormality         Status                     ---------                               -----------         ------                      COVID-19 and FLU A/B PCR...[[id number]]  Normal              Final result                 Please view results for these tests on the individual orders.    COVID-19 and FLU A/B PCR, 1 HR TAT - Swab, Nasopharynx [[id number]]  (Normal) Collected: 12/18/24 0102    Specimen: Swab from Nasopharynx Updated: 12/18/24 0132     COVID19 Not Detected     Influenza A PCR Not Detected     Influenza B PCR Not Detected    Narrative:      Fact sheet for providers: https://www.fda.gov/media/611906/download    Fact sheet for patients: https://www.fda.gov/media/745549/download    Test performed by PCR.    Comprehensive Metabolic Panel [[id number]]  (Abnormal) Collected: 12/18/24 0102    Specimen: Blood Updated: 12/18/24 0127     Glucose 119 mg/dL      BUN 53 mg/dL      Creatinine 2.95 mg/dL      Sodium 138 mmol/L      Potassium 3.4 mmol/L      Comment: Slight hemolysis detected by analyzer. Result may be falsely elevated.        Chloride 103 mmol/L      CO2 15.0 mmol/L      Calcium 9.4 mg/dL      Total Protein 7.8 g/dL      Albumin 3.8 g/dL      ALT (SGPT) 5 U/L      AST (SGOT) 14 U/L      Alkaline Phosphatase 89 U/L      Total Bilirubin 0.2 mg/dL      Globulin 4.0 gm/dL      A/G Ratio 1.0 g/dL      BUN/Creatinine Ratio 18.0     Anion Gap 20.0 mmol/L      eGFR 15.4 mL/min/1.73     Narrative:      GFR Categories in Chronic Kidney Disease (CKD)      GFR Category          GFR (mL/min/1.73)    Interpretation  G1                     90 or greater         Normal or high (1)  G2                      60-89                Mild decrease (1)  G3a                   45-59                Mild to moderate decrease  G3b                   30-44                Moderate to severe decrease  G4                    15-29                Severe decrease  G5                    14 or less           Kidney failure          (1)In the absence of evidence of kidney disease, neither GFR category G1 or G2 fulfill the criteria for CKD.    eGFR calculation 2021 CKD-EPI  creatinine equation, which does not include race as a factor    Magnesium [[id number]]  (Normal) Collected: 12/18/24 0102    Specimen: Blood Updated: 12/18/24 0127     Magnesium 2.0 mg/dL     Lipase [[id number]]  (Normal) Collected: 12/18/24 0102    Specimen: Blood Updated: 12/18/24 0123     Lipase 20 U/L     Protime-INR [[id number]]  (Abnormal) Collected: 12/18/24 0102    Specimen: Blood Updated: 12/18/24 0121     Protime 26.8 Seconds      INR 2.36    aPTT [[id number]]  (Abnormal) Collected: 12/18/24 0102    Specimen: Blood Updated: 12/18/24 0121     PTT 38.9 seconds     CBC & Differential [[id number]]  (Abnormal) Collected: 12/18/24 0102    Specimen: Blood Updated: 12/18/24 0111    Narrative:      The following orders were created for panel order CBC & Differential.  Procedure                               Abnormality         Status                     ---------                               -----------         ------                     CBC Auto Differential[[id number]]        Abnormal            Final result                 Please view results for these tests on the individual orders.    CBC Auto Differential [[id number]]  (Abnormal) Collected: 12/18/24 0102    Specimen: Blood Updated: 12/18/24 0111     WBC 9.85 10*3/mm3      RBC 3.57 10*6/mm3      Hemoglobin 10.4 g/dL      Hematocrit 32.6 %      MCV 91.3 fL      MCH 29.1 pg      MCHC 31.9 g/dL      RDW 15.5 %      RDW-SD 50.9 fl      MPV 10.9 fL      Platelets 260 10*3/mm3      Neutrophil % 73.8 %      Lymphocyte % 19.1 %      Monocyte % 6.1 %      Eosinophil % 0.3 %      Basophil % 0.3 %      Immature Grans % 0.4 %      Neutrophils, Absolute 7.27 10*3/mm3      Lymphocytes, Absolute 1.88 10*3/mm3      Monocytes, Absolute 0.60 10*3/mm3      Eosinophils, Absolute 0.03 10*3/mm3      Basophils, Absolute 0.03 10*3/mm3      Immature Grans, Absolute 0.04 10*3/mm3      nRBC 0.0 /100 WBC           Imaging Results (Last 24 Hours)       Procedure Component Value Units Date/Time     XR Chest 1 View [[id number]] Resulted: 12/18/24 0138     Updated: 12/18/24 0202    CT Head Without Contrast [[id number]] Resulted: 12/18/24 0102     Updated: 12/18/24 0130    CT Cervical Spine Without Contrast [[id number]] Resulted: 12/18/24 0102     Updated: 12/18/24 0130          I have personally reviewed and interpreted the radiology studies and ECG obtained at time of admission.     Assessment / Plan   Assessment:   Active Hospital Problems    Diagnosis     **Altered mental status        Treatment Plan  The patient will be admitted to my service here at [facility].  1.  Metabolic encephalopathy  2.  Acute kidney injury  3.  Acute cystitis without hematuria  4.  History of depression  5.  History of hypertension  6.  History of hyperlipidemia  7.  History of obstructive sleep apnea  8.  Status post fall      The patient will be admitted under my service, she will be started on IV fluids, IV ceftriaxone, will avoid nephrotoxic agents.  Will follow-up on imaging studies obtained in the ER.  Resume home medications.    Medical Decision Making  Number and Complexity of problems: 8  Differential Diagnosis: Fall    Conditions and Status        Condition is unchanged.     [facility] Data  External documents reviewed: N/A  Cardiac tracing (EKG, telemetry) interpretation: Sinus  Radiology interpretation: Reviewed  Labs reviewed: Yes  Any tests that were considered but not ordered: N/A     Decision rules/scores evaluated (example [id number], Wells, etc): N/A     Discussed with: Nursing staff     Care Planning  Shared decision making: Patient  Code status and discussions: Full    Disposition  Social Determinants of Health that impact treatment or disposition: None  Estimated length of stay is 3 days.     I confirmed that the patient's advanced care plan is present, code status is documented, and a surrogate decision maker is listed in the patient's medical record.     The patient's surrogate decision maker is  patient.     The patient was seen and examined by me on 12/18/2024 at 0250.    Electronically signed by Antonino Braden MD, 12/18/24, 03:08 CST.
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
## 2024-12-18 NOTE — ED PROVIDER NOTES
Subjective   History of Present Illness  Pt bib EMS from home with report of increased confusion and fall today.  Pt reportedly brought from home - was discharged from NH 3d ago.  Nursing reports pt was in soiled brief.  Pt is poor historian/anxious and confused in room        Review of Systems   Reason unable to perform ROS: mentation.       Past Medical History:   Diagnosis Date    Anxiety     Arthritis     Bronchitis     Cancer     uterine    Chronic pain     Depression     Disease of thyroid gland     Fibromyalgia     GERD (gastroesophageal reflux disease)     Headache     History of transfusion     AS     Hyperlipidemia     Hypertension     Incontinence     Insomnia     Leg pain     Lumbar stenosis     Migraines     Peptic ulcer     Restless legs     Sleep apnea     NO C-PAP    UTI (urinary tract infection)     Vaginal bleeding        Allergies   Allergen Reactions    Ropinirole Hcl Shortness Of Breath    Codeine Itching and Mental Status Change    Definity [Perflutren Lipid Microsphere] Other (See Comments)     Severe back pain    Ambien [Zolpidem] Other (See Comments)     HYPER     Eszopiclone Other (See Comments)     MAKES PT HYPER     Pregabalin Dizziness    Tizanidine Other (See Comments)     Terrible nightmares       Past Surgical History:   Procedure Laterality Date    BLADDER REPAIR      MESH HAD TO BE REMOVED IN 2013    BREAST BIOPSY Right 2017    benign    BREAST CYST EXCISION Left     CARDIAC CATHETERIZATION      CARPAL TUNNEL RELEASE      CATARACT EXTRACTION W/ INTRAOCULAR LENS  IMPLANT, BILATERAL      CHOLECYSTECTOMY WITH INTRAOPERATIVE CHOLANGIOGRAM N/A 2023    Procedure: CHOLECYSTECTOMY LAPAROSCOPIC INTRAOPERATIVE CHOLANGIOGRAM;  Surgeon: Gerardo Arciniega MD;  Location: [facility] OR;  Service: General;  Laterality: N/A;    COLONOSCOPY      COLONOSCOPY N/A 10/01/2021    Procedure: COLONOSCOPY WITH ANESTHESIA;  Surgeon: Tom Velasco DO;  Location: [facility] ENDOSCOPY;  Service:  Gastroenterology;  Laterality: N/A;  pre: change in bowel habits  post: diverticulosis. hemorrhoids.   Olivia Mora APRN        CYSTECTOMY      D & C HYSTEROSCOPY N/A 11/06/2017    Procedure: DILATATION AND CURETTAGE HYSTEROSCOPY;  Surgeon: Shasta Madrigal MD;  Location: [facility] OR;  Service:     DILATION AND CURETTAGE, DIAGNOSTIC / THERAPEUTIC  2008    ENDOSCOPY  09/23/2010    Short segment of Arriola's,Moderate chroninc esophagogastritis and negative H.pylori    ENDOSCOPY N/A 09/25/2017    Procedure: ESOPHAGOGASTRODUODENOSCOPY WITH ANESTHESIA;  Surgeon: Tom Velasco DO;  Location: [facility] ENDOSCOPY;  Service:     EYE SURGERY      RETINA    HEMORRHOIDECTOMY SIGMOIDOSCOPY N/A 3/21/2023    Procedure: HEMORRHOIDECTOMY WITH EXAM UNDER ANESTHESIA;  Surgeon: Holly Chavez MD;  Location: [facility] OR;  Service: General;  Laterality: N/A;    HYSTERECTOMY  12/20/2017    ORIF TIBIA/FIBULA FRACTURES Left 2000    TRANSVAGINAL TAPING SUSPENSION N/A 11/06/2017    Procedure: VAGINAL MESH REVISION;  Surgeon: Shasta Madrigal MD;  Location: [facility] OR;  Service:     VAGINAL MESH REVISION  2013       Family History   Problem Relation Age of Onset    Diabetes Mother     Multiple myeloma Mother     Stroke Father     Diabetes Sister     Prostate cancer Brother     Lymphoma Brother         NHL    Ovarian cancer Paternal Aunt     Cancer Paternal Grandmother         metastatic    Lung cancer Paternal Grandfather     Colon cancer Neg Hx     Esophageal cancer Neg Hx     Breast cancer Neg Hx        Social History     Socioeconomic History    Marital status:    Tobacco Use    Smoking status: Never    Smokeless tobacco: Never   Vaping Use    Vaping status: Never Used   Substance and Sexual Activity    Alcohol use: Not Currently     Comment: occasional    Drug use: No    Sexual activity: Defer           Objective   Physical Exam  Vitals and nursing note reviewed.   Constitutional:       Appearance: She is well-developed.    HENT:      Head: Normocephalic and atraumatic.      Mouth/Throat:      Mouth: Mucous membranes are moist.   Eyes:      Extraocular Movements: Extraocular movements intact.      Pupils: Pupils are equal, round, and reactive to light.   Cardiovascular:      Rate and Rhythm: Normal rate and regular rhythm.   Pulmonary:      Effort: Pulmonary effort is normal.   Abdominal:      General: Bowel sounds are normal.      Palpations: Abdomen is soft.   Musculoskeletal:      Comments: No deformity noted -moving all extremities   Skin:     Comments: Pt with skin breakdown/maceration to perianal/buttock regions.  Also noted to intertriginous regions and vulva   Neurological:      Mental Status: She is alert.      Comments: Pt confused/agitated.  She will answer some questions.  Moves all extremities.  No facial droop.  No dysarthria         Procedures           ED Course      Labs Reviewed   COMPREHENSIVE METABOLIC PANEL - Abnormal; Notable for the following components:       Result Value    Glucose 119 (*)     BUN 53 (*)     Creatinine 2.95 (*)     Potassium 3.4 (*)     CO2 15.0 (*)     Anion Gap 20.0 (*)     eGFR 15.4 (*)     All other components within normal limits    Narrative:     GFR Categories in Chronic Kidney Disease (CKD)      GFR Category          GFR (mL/min/1.73)    Interpretation  G1                     90 or greater         Normal or high (1)  G2                      60-89                Mild decrease (1)  G3a                   45-59                Mild to moderate decrease  G3b                   30-44                Moderate to severe decrease  G4                    15-29                Severe decrease  G5                    14 or less           Kidney failure          (1)In the absence of evidence of kidney disease, neither GFR category G1 or G2 fulfill the criteria for CKD.    eGFR calculation 2021 CKD-EPI creatinine equation, which does not include race as a factor   PROTIME-INR - Abnormal; Notable for the  following components:    Protime 26.8 (*)     INR 2.36 (*)     All other components within normal limits   APTT - Abnormal; Notable for the following components:    PTT 38.9 (*)     All other components within normal limits   URINALYSIS W/ CULTURE IF INDICATED - Abnormal; Notable for the following components:    Appearance, UA Turbid (*)     Blood, UA Trace (*)     Protein, UA 30 mg/dL (1+) (*)     Leuk Esterase, UA Large (3+) (*)     Nitrite, UA Positive (*)     All other components within normal limits    Narrative:     In absence of clinical symptoms, the presence of pyuria, bacteria, and/or nitrites on the urinalysis result does not correlate with infection.   LACTIC ACID, PLASMA - Abnormal; Notable for the following components:    Lactate 2.7 (*)     All other components within normal limits   CBC WITH AUTO DIFFERENTIAL - Abnormal; Notable for the following components:    RBC 3.57 (*)     Hemoglobin 10.4 (*)     Hematocrit 32.6 (*)     RDW 15.5 (*)     Lymphocyte % 19.1 (*)     Neutrophils, Absolute 7.27 (*)     All other components within normal limits   URINALYSIS, MICROSCOPIC ONLY - Abnormal; Notable for the following components:    RBC, UA 6-10 (*)     WBC, UA Too Numerous to Count (*)     Bacteria, UA 4+ (*)     All other components within normal limits   COVID-19 AND FLU A/B, NP SWAB IN TRANSPORT MEDIA 1 HR TAT - Normal    Narrative:     Fact sheet for providers: https://www.fda.gov/media/710088/download    Fact sheet for patients: https://www.fda.gov/media/840675/download    Test performed by PCR.   LIPASE - Normal   MAGNESIUM - Normal   COVID PRE-OP / PRE-PROCEDURE SCREENING ORDER (NO ISOLATION)    Narrative:     The following orders were created for panel order COVID PRE-OP / PRE-PROCEDURE SCREENING ORDER (NO ISOLATION) - Swab, Nasal Cavity.  Procedure                               Abnormality         Status                     ---------                               -----------         ------                      COVID-19 and FLU A/B PCR...[[id number]]  Normal              Final result                 Please view results for these tests on the individual orders.   URINE CULTURE   BLOOD CULTURE   BLOOD CULTURE   LACTIC ACID, REFLEX   CBC AND DIFFERENTIAL    Narrative:     The following orders were created for panel order CBC & Differential.  Procedure                               Abnormality         Status                     ---------                               -----------         ------                     CBC Auto Differential[[id number]]        Abnormal            Final result                 Please view results for these tests on the individual orders.     XR Chest 1 View    (Results Pending)   CT Head Without Contrast    (Results Pending)   CT Cervical Spine Without Contrast    (Results Pending)                                                        Medical Decision Making  Pt stable in EC - resting more comfortably att.  Pt with increased confusion and recent fall.  As there were few details regarding this - CT head and Cspine obtained - no acute finding noted on these.  Pt with + UTI and elevated lactate c/w some urosepsis.  BP stable/no shock pathology att.  She has significant skin breakdown to buttock region and some likely candidal intertrigo.  + TOMÁS.  Pt was given rocephin 1g IV and IVFs.  She was given 1mg ativan for her anxiety earlier.  Will admit for further mgmt - d/w Dr. Braden    Amount and/or Complexity of Data Reviewed  Labs: ordered.  Radiology: ordered.  ECG/medicine tests: ordered.    Risk  Prescription drug management.        Final diagnoses:   TOMÁS (acute kidney injury)   Acute UTI   Sepsis, due to unspecified organism, unspecified whether acute organ dysfunction present   Pressure injury of skin, unspecified injury stage, unspecified location   Candidal intertrigo   Altered mental status, unspecified altered mental status type       ED Disposition  ED Disposition       ED Disposition    Decision to Admit    Condition   --    Comment   --               No follow-up provider specified.       Medication List      No changes were made to your prescriptions during this visit.            Eugene Ford,   12/18/24 0103       Eugene Ford, DO  12/18/24 0228       Eugene Ford, DO  12/18/24 0229

## 2024-12-18 NOTE — THERAPY EVALUATION
Acute Care - Speech Language Pathology   Swallow Initial Evaluation [facility]     Patient Name: Jennifer Gomes  : 1940  MRN: [id number]  Today's Date: 2024               Admit Date: 2024    SPEECH-LANGUAGE PATHOLOGY EVALUATION - SWALLOW  Subjective: The patient was seen on this date for a Clinical Swallow evaluation.   Patient was lethargic .  Significant history: AMS, metabolic encephalopathy, TOMÁS, HTN, HLD, RAFAL, fall.   Objective: Textures given included thin liquid.  Assessment: Difficulties were noted with thin liquid.  Observations:  Pt would wake to voice and touch, but would quickly fall back to sleep. With consistent cues, SLP was able to present a small amount of water via straw end. Pt had no overt s/s of aspiration. When SLP presented the straw for pt to independently obtain water, she did not close her mouth around the straw and went back to sleep. Pt was recently admitted at this facility in October and SLP recommended a regular solid diet and thin liquids at that time.   SLP Findings:  Patient presents with moderate oral dysphagia due to lethargy and cognitive state, without esophageal component, rule out pharyngeal dysphagia.   Recommendations: Diet Textures: thin liquid, puree consistency food if she becomes more alert.  Medications should be taken crushed with puree.   Recommended Strategies:  1:1 assistance with meals, Upright for PO, small bites and sips, and alternate liquids and solids. Oral care before breakfast, after all meals and PRN.  Other Recommended Evaluations: Re-evaluation at bedside    Dysphagia therapy is recommended.   Serena Vasquez CCC-SLP 2024 09:10 CST     Visit Dx:     ICD-10-CM ICD-9-CM   1. TOMÁS (acute kidney injury)  N17.9 584.9   2. Acute UTI  N39.0 599.0   3. Sepsis, due to unspecified organism, unspecified whether acute organ dysfunction present  A41.9 038.9     995.91   4. Pressure injury of skin, unspecified injury stage, [facility]  [facility]     707.20   5. Candidal intertrigo  B37.2 112.3   6. Altered mental status, unspecified altered mental status type  R41.82 780.97   7. Dysphagia, unspecified type [R13.10]  R13.10 787.20     Patient Active Problem List   Diagnosis    Gastroesophageal reflux disease    Spinal stenosis, lumbar region, without neurogenic claudication    Erosion of vaginal mesh    Adenocarcinoma of endometrium    S/P hysterectomy with oophorectomy    Encounter for follow-up surveillance of endometrial cancer    History of radiation therapy    Non-traumatic rhabdomyolysis    Acute renal failure superimposed on stage 3 chronic kidney disease    Medical non-compliance, does not take narcotics as prescribed    Chronic constipation    Chronic pain syndrome    Chronic prescription opiate use    Anemia    Hypothyroidism (acquired)    Essential hypertension    Venous insufficiency of both lower extremities    Chronic intractable headache    RAFAL (obstructive sleep apnea)    Acute encephalopathy due to UTI    Toxic metabolic encephalopathy    Polypharmacy    Frequent falls    Grade III internal hemorrhoids    External hemorrhoids    Colitis    Moderate malnutrition    Transaminitis    Acute UTI (urinary tract infection)    Polypharmacy    UTI (urinary tract infection)    Dementia    Hypokalemia    Sepsis due to urinary tract infection    Cardiopulmonary arrest    E coli bacteremia    Anemia requiring transfusions    Bilateral hydronephrosis    Acute urinary retention    TOMÁS (acute kidney injury)    Acute cystitis    Hypercalcemia    Paroxysmal atrial fibrillation    Altered mental status     Past Medical History:   Diagnosis Date    Anxiety     Arthritis     Bronchitis     Cancer     uterine    Chronic pain     Depression     Disease of thyroid gland     Fibromyalgia     GERD (gastroesophageal reflux disease)     Headache     History of transfusion     AS     Hyperlipidemia     Hypertension     Incontinence      Insomnia     Leg pain     Lumbar stenosis     Migraines     Peptic ulcer     Restless legs     Sleep apnea     NO C-PAP    UTI (urinary tract infection)     Vaginal bleeding      Past Surgical History:   Procedure Laterality Date    BLADDER REPAIR  2011    MESH HAD TO BE REMOVED IN 2013    BREAST BIOPSY Right 2017    benign    BREAST CYST EXCISION Left 1982    CARDIAC CATHETERIZATION      CARPAL TUNNEL RELEASE      CATARACT EXTRACTION W/ INTRAOCULAR LENS  IMPLANT, BILATERAL      CHOLECYSTECTOMY WITH INTRAOPERATIVE CHOLANGIOGRAM N/A 9/7/2023    Procedure: CHOLECYSTECTOMY LAPAROSCOPIC INTRAOPERATIVE CHOLANGIOGRAM;  Surgeon: Gerardo Arciniega MD;  Location: [facility] OR;  Service: General;  Laterality: N/A;    COLONOSCOPY      COLONOSCOPY N/A 10/01/2021    Procedure: COLONOSCOPY WITH ANESTHESIA;  Surgeon: Tom Velasco DO;  Location: [facility] ENDOSCOPY;  Service: Gastroenterology;  Laterality: N/A;  pre: change in bowel habits  post: diverticulosis. hemorrhoids.   Olivia Mora APRN        CYSTECTOMY      D & C HYSTEROSCOPY N/A 11/06/2017    Procedure: DILATATION AND CURETTAGE HYSTEROSCOPY;  Surgeon: Shasta Madrigal MD;  Location: [facility] OR;  Service:     DILATION AND CURETTAGE, DIAGNOSTIC / THERAPEUTIC  2008    ENDOSCOPY  09/23/2010    Short segment of Arriola's,Moderate chroninc esophagogastritis and negative H.pylori    ENDOSCOPY N/A 09/25/2017    Procedure: ESOPHAGOGASTRODUODENOSCOPY WITH ANESTHESIA;  Surgeon: Tom Velasco DO;  Location: [facility] ENDOSCOPY;  Service:     EYE SURGERY      RETINA    HEMORRHOIDECTOMY SIGMOIDOSCOPY N/A 3/21/2023    Procedure: HEMORRHOIDECTOMY WITH EXAM UNDER ANESTHESIA;  Surgeon: Holly Chavez MD;  Location: [facility] OR;  Service: General;  Laterality: N/A;    HYSTERECTOMY  12/20/2017    ORIF TIBIA/FIBULA FRACTURES Left 2000    TRANSVAGINAL TAPING SUSPENSION N/A 11/06/2017    Procedure: VAGINAL MESH REVISION;  Surgeon: Shasta Madrigal MD;  Location: [facility] OR;  Service:      VAGINAL MESH REVISION  2013       SLP Recommendation and Plan  SLP Swallowing Diagnosis: moderate, oral dysphagia, R/O pharyngeal dysphagia (12/18/24 0825)  SLP Diet Recommendation: puree, thin liquids (12/18/24 0825)  Recommended Precautions and Strategies: upright posture during/after eating, small bites of food and sips of liquid, alternate between small bites of food and sips of liquid, general aspiration precautions, assist with feeding (12/18/24 0825)  SLP Rec. for Method of Medication Administration: meds crushed, with puree (12/18/24 0825)     Monitor for Signs of Aspiration: yes, cough, gurgly voice, throat clearing, pneumonia, notify SLP if any concerns (12/18/24 0825)     Swallow Criteria for Skilled Therapeutic Interventions Met: demonstrates skilled criteria (12/18/24 0825)  Anticipated Discharge Disposition (SLP): skilled nursing facility (12/18/24 0825)  Rehab Potential/Prognosis, Swallowing: adequate, monitor progress closely (12/18/24 0825)  Therapy Frequency (Swallow): PRN (12/18/24 0825)  Predicted Duration Therapy Intervention (Days): until discharge (12/18/24 0825)  Oral Care Recommendations: Oral Care before breakfast, after meals and PRN (12/18/24 0825)                                        Outcome Evaluation: See note      SWALLOW EVALUATION (Last 72 Hours)       SLP Adult Swallow Evaluation       Row Name 12/18/24 0825                   Rehab Evaluation    Document Type evaluation  -MB        Subjective Information fatigue  -MB        Patient Observations lethargic  -MB        Patient/Family/Caregiver Comments/Observations PCA present  -MB           General Information    Patient Profile Reviewed yes  -MB        Pertinent History Of Current Problem AMS, metabolic encephalopathy, TOMÁS, HTN, HLD, RAFAL, fall.  -MB        Current Method of Nutrition regular textures;thin liquids  -MB        Precautions/Limitations, Vision difficult to assess  -MB        Precautions/Limitations, Hearing hearing  impairment, bilaterally  -MB        Prior Level of Function-Communication unknown  -MB        Prior Level of Function-Swallowing no diet consistency restrictions  -MB        Plans/Goals Discussed with patient  -MB        Barriers to Rehab cognitive status  -MB        Patient's Goals for Discharge patient did not state  -MB           Pain    Additional Documentation Pain Scale: FACES Pre/Post-Treatment (Group)  -MB           Pain Scale: FACES Pre/Post-Treatment    Pain: FACES Scale, Pretreatment 0-->no hurt  -MB           Oral Motor Structure and Function    Dentition Assessment natural, present and adequate  -MB        Secretion Management WNL/WFL  -MB        Mucosal Quality dry  -MB           Oral Musculature and Cranial Nerve Assessment    Oral Motor General Assessment unable to assess  -MB           General Eating/Swallowing Observations    Eating/Swallowing Skills fed by SLP  -MB        Positioning During Eating upright in bed  -MB        Utensils Used straw  -MB        Consistencies Trialed thin liquids  -MB           Clinical Swallow Eval    Oral Prep Phase impaired  -MB        Oral Transit WFL  -MB        Oral Residue WFL  -MB        Pharyngeal Phase no overt signs/symptoms of pharyngeal impairment  -MB        Esophageal Phase unremarkable  -MB        Clinical Swallow Evaluation Summary See note  -MB           Oral Prep Concerns    Oral Prep Concerns incomplete or weak lip closure around spoon  -MB        Incomplete or Weak Lip Closure Around Spoon thin  -MB           SLP Evaluation Clinical Impression    SLP Swallowing Diagnosis moderate;oral dysphagia;R/O pharyngeal dysphagia  -MB        Functional Impact risk of aspiration/pneumonia;risk of malnutrition;risk of dehydration  -MB        Rehab Potential/Prognosis, Swallowing adequate, monitor progress closely  -MB        Swallow Criteria for Skilled Therapeutic Interventions Met demonstrates skilled criteria  -MB           Recommendations    Therapy Frequency  (Swallow) PRN  -MB        Predicted Duration Therapy Intervention (Days) until discharge  -MB        SLP Diet Recommendation puree;thin liquids  -MB        Recommended Precautions and Strategies upright posture during/after eating;small bites of food and sips of liquid;alternate between small bites of food and sips of liquid;general aspiration precautions;assist with feeding  -MB        Oral Care Recommendations Oral Care before breakfast, after meals and PRN  -MB        SLP Rec. for Method of Medication Administration meds crushed;with puree  -MB        Monitor for Signs of Aspiration yes;cough;gurgly voice;throat clearing;pneumonia;notify SLP if any concerns  -MB        Anticipated Discharge Disposition (SLP) skilled nursing facility  -MB           Swallow Goals (SLP)    Swallow LTGs Swallow Long Term Goal (free text)  -MB        Swallow STGs diet tolerance goal selection (SLP)  -MB        Diet Tolerance Goal Selection (SLP) Patient will tolerate trials of  -MB           (LTG) Swallow    (LTG) Swallow Pt will tolerate least restrictive diet with no overt s/s of aspiration.  -MB        [place] (Swallow Long Term Goal) independently (over 90% accuracy)  -MB        Time Frame (Swallow Long Term Goal) by discharge  -MB        Barriers (Swallow Long Term Goal) n/a  -MB        Progress/Outcomes (Swallow Long Term Goal) new goal  -MB        Comment (Swallow Long Term Goal) n/a  -MB           (STG) Patient will tolerate trials of    Consistencies Trialed (Tolerate trials) regular textures;soft to chew (whole) textures;pureed textures;thin liquids  -MB        Desired Outcome (Tolerate trials) without signs/symptoms of aspiration;with adequate oral prep/transit/clearance  -MB        [place] (Tolerate trials) independently (over 90% accuracy)  -MB        Time Frame (Tolerate trials) by discharge  -MB        Progress/Outcomes (Tolerate trials) new goal  -MB        Comment (Tolerate trials) n/a  -MB                   User Key  (r) = Recorded By, (t) = Taken By, (c) = Cosigned By      Initials Name Effective Dates    Serena Gan CCC-SLP 02/03/23 -                     EDUCATION  The patient has been educated in the following areas:   Dysphagia (Swallowing Impairment).        SLP GOALS       Row Name 12/18/24 0825             (LTG) Swallow    (LTG) Swallow Pt will tolerate least restrictive diet with no overt s/s of aspiration.  -MB      [place] (Swallow Long Term Goal) independently (over 90% accuracy)  -MB      Time Frame (Swallow Long Term Goal) by discharge  -MB      Barriers (Swallow Long Term Goal) n/a  -MB      Progress/Outcomes (Swallow Long Term Goal) new goal  -MB      Comment (Swallow Long Term Goal) n/a  -MB         (STG) Patient will tolerate trials of    Consistencies Trialed (Tolerate trials) regular textures;soft to chew (whole) textures;pureed textures;thin liquids  -MB      Desired Outcome (Tolerate trials) without signs/symptoms of aspiration;with adequate oral prep/transit/clearance  -MB      [place] (Tolerate trials) independently (over 90% accuracy)  -MB      Time Frame (Tolerate trials) by discharge  -MB      Progress/Outcomes (Tolerate trials) new goal  -MB      Comment (Tolerate trials) n/a  -MB                User Key  (r) = Recorded By, (t) = Taken By, (c) = Cosigned By      Initials Name Provider Type    Serena Gan CCC-SLP Speech and Language Pathologist                         Time Calculation:    Time Calculation- SLP       Row Name 12/18/24 0909             Time Calculation- SLP    SLP Start Time 0825  -MB      SLP Stop Time 0909  -MB      SLP Time Calculation (min) 44 min  -MB      SLP Received On 12/18/24  -MB      SLP Goal Re-Cert Due Date 12/28/24  -MB         Untimed Charges    98791-FA Eval Oral Pharyng Swallow Minutes 44  -MB         Total Minutes    Untimed Charges Total Minutes 44  -MB       Total Minutes 44  -MB                User Key  (r) = Recorded By, (t)  = Taken By, (c) = Cosigned By      Initials Name Provider Type    Serena Gan, CCC-SLP Speech and Language Pathologist                    Therapy Charges for Today       Code Description Service Date Service Provider Modifiers Qty    [id number]  ST EVAL ORAL PHARYNG SWALLOW 3 12/18/2024 Serena Vasquez CCC-SLP  1                 Serena DARBY. NICHO Vasquez  12/18/2024

## 2024-12-18 NOTE — PLAN OF CARE
Goal Outcome Evaluation:  Plan of Care Reviewed With: patient           Outcome Evaluation: See note    Anticipated Discharge Disposition (SLP): skilled nursing facility          SLP Swallowing Diagnosis: moderate, oral dysphagia, R/O pharyngeal dysphagia (12/18/24 8702)

## 2024-12-19 ENCOUNTER — APPOINTMENT (OUTPATIENT)
Dept: MRI IMAGING | Facility: HOSPITAL | Age: 84
End: 2024-12-19
Payer: MEDICARE

## 2024-12-19 LAB
ANION GAP SERPL CALCULATED.3IONS-SCNC: 11 MMOL/L (ref 5–15)
ANION GAP SERPL CALCULATED.3IONS-SCNC: 14 MMOL/L (ref 5–15)
BASOPHILS # BLD AUTO: 0.01 10*3/MM3 (ref 0–0.2)
BASOPHILS NFR BLD AUTO: 0.2 % (ref 0–1.5)
BUN SERPL-MCNC: 35 MG/DL (ref 8–23)
BUN SERPL-MCNC: 40 MG/DL (ref 8–23)
BUN/CREAT SERPL: 17.1 (ref 7–25)
BUN/CREAT SERPL: 18.3 (ref 7–25)
CALCIUM SPEC-SCNC: 8.2 MG/DL (ref 8.6–10.5)
CALCIUM SPEC-SCNC: 8.4 MG/DL (ref 8.6–10.5)
CHLORIDE SERPL-SCNC: 115 MMOL/L (ref 98–107)
CHLORIDE SERPL-SCNC: 118 MMOL/L (ref 98–107)
CO2 SERPL-SCNC: 13 MMOL/L (ref 22–29)
CO2 SERPL-SCNC: 15 MMOL/L (ref 22–29)
CREAT SERPL-MCNC: 2.05 MG/DL (ref 0.57–1)
CREAT SERPL-MCNC: 2.18 MG/DL (ref 0.57–1)
D-LACTATE SERPL-SCNC: 0.6 MMOL/L (ref 0.5–2)
DEPRECATED RDW RBC AUTO: 55.9 FL (ref 37–54)
EGFRCR SERPLBLD CKD-EPI 2021: 22.1 ML/MIN/1.73
EGFRCR SERPLBLD CKD-EPI 2021: 23.8 ML/MIN/1.73
EOSINOPHIL # BLD AUTO: 0.15 10*3/MM3 (ref 0–0.4)
EOSINOPHIL NFR BLD AUTO: 3 % (ref 0.3–6.2)
ERYTHROCYTE [DISTWIDTH] IN BLOOD BY AUTOMATED COUNT: 16.3 % (ref 12.3–15.4)
GLUCOSE SERPL-MCNC: 106 MG/DL (ref 65–99)
GLUCOSE SERPL-MCNC: 77 MG/DL (ref 65–99)
HCT VFR BLD AUTO: 23.9 % (ref 34–46.6)
HCT VFR BLD AUTO: 24.1 % (ref 34–46.6)
HGB BLD-MCNC: 7.5 G/DL (ref 12–15.9)
HGB BLD-MCNC: 7.6 G/DL (ref 12–15.9)
IMM GRANULOCYTES # BLD AUTO: 0.02 10*3/MM3 (ref 0–0.05)
IMM GRANULOCYTES NFR BLD AUTO: 0.4 % (ref 0–0.5)
LYMPHOCYTES # BLD AUTO: 1.46 10*3/MM3 (ref 0.7–3.1)
LYMPHOCYTES NFR BLD AUTO: 29.1 % (ref 19.6–45.3)
MCH RBC QN AUTO: 30 PG (ref 26.6–33)
MCHC RBC AUTO-ENTMCNC: 31.1 G/DL (ref 31.5–35.7)
MCV RBC AUTO: 96.4 FL (ref 79–97)
MONOCYTES # BLD AUTO: 0.33 10*3/MM3 (ref 0.1–0.9)
MONOCYTES NFR BLD AUTO: 6.6 % (ref 5–12)
NEUTROPHILS NFR BLD AUTO: 3.04 10*3/MM3 (ref 1.7–7)
NEUTROPHILS NFR BLD AUTO: 60.7 % (ref 42.7–76)
NRBC BLD AUTO-RTO: 0 /100 WBC (ref 0–0.2)
PLATELET # BLD AUTO: 156 10*3/MM3 (ref 140–450)
PMV BLD AUTO: 11.2 FL (ref 6–12)
POTASSIUM SERPL-SCNC: 3 MMOL/L (ref 3.5–5.2)
POTASSIUM SERPL-SCNC: 3.2 MMOL/L (ref 3.5–5.2)
RBC # BLD AUTO: 2.5 10*6/MM3 (ref 3.77–5.28)
SODIUM SERPL-SCNC: 142 MMOL/L (ref 136–145)
SODIUM SERPL-SCNC: 144 MMOL/L (ref 136–145)
WBC NRBC COR # BLD AUTO: 5.01 10*3/MM3 (ref 3.4–10.8)

## 2024-12-19 PROCEDURE — 99223 1ST HOSP IP/OBS HIGH 75: CPT | Performed by: CLINICAL NURSE SPECIALIST

## 2024-12-19 PROCEDURE — 80048 BASIC METABOLIC PNL TOTAL CA: CPT | Performed by: FAMILY MEDICINE

## 2024-12-19 PROCEDURE — 85014 HEMATOCRIT: CPT | Performed by: FAMILY MEDICINE

## 2024-12-19 PROCEDURE — 85018 HEMOGLOBIN: CPT | Performed by: FAMILY MEDICINE

## 2024-12-19 PROCEDURE — 25010000002 CEFTRIAXONE PER 250 MG: Performed by: INTERNAL MEDICINE

## 2024-12-19 PROCEDURE — 83605 ASSAY OF LACTIC ACID: CPT | Performed by: FAMILY MEDICINE

## 2024-12-19 PROCEDURE — 70551 MRI BRAIN STEM W/O DYE: CPT

## 2024-12-19 PROCEDURE — 97166 OT EVAL MOD COMPLEX 45 MIN: CPT | Performed by: OCCUPATIONAL THERAPIST

## 2024-12-19 PROCEDURE — 25810000003 LACTATED RINGERS SOLUTION: Performed by: FAMILY MEDICINE

## 2024-12-19 PROCEDURE — 92526 ORAL FUNCTION THERAPY: CPT

## 2024-12-19 PROCEDURE — 80048 BASIC METABOLIC PNL TOTAL CA: CPT | Performed by: INTERNAL MEDICINE

## 2024-12-19 PROCEDURE — 85025 COMPLETE CBC W/AUTO DIFF WBC: CPT | Performed by: INTERNAL MEDICINE

## 2024-12-19 PROCEDURE — 97162 PT EVAL MOD COMPLEX 30 MIN: CPT

## 2024-12-19 RX ORDER — SODIUM CHLORIDE AND POTASSIUM CHLORIDE 150; 900 MG/100ML; MG/100ML
100 INJECTION, SOLUTION INTRAVENOUS CONTINUOUS
Status: DISPENSED | OUTPATIENT
Start: 2024-12-19 | End: 2024-12-20

## 2024-12-19 RX ORDER — POTASSIUM CHLORIDE 750 MG/1
40 CAPSULE, EXTENDED RELEASE ORAL
Status: COMPLETED | OUTPATIENT
Start: 2024-12-19 | End: 2024-12-19

## 2024-12-19 RX ORDER — POTASSIUM CHLORIDE 7.45 MG/ML
10 INJECTION INTRAVENOUS
Status: DISCONTINUED | OUTPATIENT
Start: 2024-12-19 | End: 2024-12-19

## 2024-12-19 RX ADMIN — DONEPEZIL HYDROCHLORIDE 10 MG: 10 TABLET, FILM COATED ORAL at 20:01

## 2024-12-19 RX ADMIN — POTASSIUM CHLORIDE 40 MEQ: 750 CAPSULE, EXTENDED RELEASE ORAL at 17:23

## 2024-12-19 RX ADMIN — POTASSIUM CHLORIDE 40 MEQ: 750 CAPSULE, EXTENDED RELEASE ORAL at 13:47

## 2024-12-19 RX ADMIN — ZINC OXIDE 1 APPLICATION: 200 OINTMENT TOPICAL at 12:13

## 2024-12-19 RX ADMIN — SERTRALINE HYDROCHLORIDE 25 MG: 50 TABLET ORAL at 08:50

## 2024-12-19 RX ADMIN — ZINC OXIDE 1 APPLICATION: 200 OINTMENT TOPICAL at 20:06

## 2024-12-19 RX ADMIN — ACETAMINOPHEN 650 MG: 325 TABLET ORAL at 19:58

## 2024-12-19 RX ADMIN — ATORVASTATIN CALCIUM 40 MG: 40 TABLET, FILM COATED ORAL at 08:50

## 2024-12-19 RX ADMIN — Medication 10 ML: at 20:03

## 2024-12-19 RX ADMIN — SODIUM CHLORIDE 1000 MG: 900 INJECTION INTRAVENOUS at 22:37

## 2024-12-19 RX ADMIN — POTASSIUM CHLORIDE 40 MEQ: 750 CAPSULE, EXTENDED RELEASE ORAL at 11:21

## 2024-12-19 RX ADMIN — SODIUM CHLORIDE, POTASSIUM CHLORIDE, SODIUM LACTATE AND CALCIUM CHLORIDE 500 ML: 600; 310; 30; 20 INJECTION, SOLUTION INTRAVENOUS at 08:49

## 2024-12-19 RX ADMIN — LEVOTHYROXINE SODIUM 50 MCG: 0.05 TABLET ORAL at 06:11

## 2024-12-19 RX ADMIN — ZINC OXIDE 1 APPLICATION: 200 OINTMENT TOPICAL at 08:56

## 2024-12-19 RX ADMIN — PANTOPRAZOLE SODIUM 40 MG: 40 TABLET, DELAYED RELEASE ORAL at 06:11

## 2024-12-19 RX ADMIN — QUETIAPINE FUMARATE 25 MG: 25 TABLET ORAL at 20:01

## 2024-12-19 RX ADMIN — ZINC OXIDE 1 APPLICATION: 200 OINTMENT TOPICAL at 18:18

## 2024-12-19 RX ADMIN — Medication 10 ML: at 09:01

## 2024-12-19 NOTE — PROGRESS NOTES
Patient evaluated  Does not provide information on my visit.  Verbally aggressive.    Hypotension this morning.  Give lactated Ringer's 500 mL IV x 1 and increase IV fluids to 125 mL/h after this.  Hold beta-blocker  Monitor urine output.  Patient has a Smith in place    Potassium 3.0; will replace with KCl IV x 2 doses 10 mg once    Repeat lactate level today    Creatinine gradually improving, on admission was 2.95, and today it is 2.18  CO2 13.  Will start bicarbonate orally.    Hemoglobin on admission was 10.4, today 7.5  Hold Lovenox  Repeat hemoglobin hematocrit at 1 PM    Will consider palliative care evaluation.

## 2024-12-19 NOTE — PROGRESS NOTES
Patient Name: Jennifer Gomes  Date of Admission: 12/18/2024  Today's Date: 12/19/24  Length of Stay: 1  Primary Care Physician: Moiz Schwartz DO    Subjective   Chief Complaint: Altered mental status  HPI   Today: Patient lying in bed, awakened from sleep.  Daughter at bedside.  Daughter reports multiple falls at home recently, with one of them being the morning she came in.  She states that when her mother woke up approximately 4 days ago, she had acute bilateral hearing loss, mumbling speech, and anger outburst.  Discussed this with Dr. Espitia and a MRI of the brain has been ordered.  Patient agreeable to [facility] when medically stable.  Patient denies any pain.  Indwelling catheter in place draining clear yellow urine.        Documented weights    12/18/24 0042 12/18/24 0434   Weight: 74.8 kg (165 lb) 65.5 kg (144 lb 6.4 oz)          Intake/Output Summary (Last 24 hours) at 12/19/2024 1433  Last data filed at 12/19/2024 1351  Gross per 24 hour   Intake --   Output 625 ml   Net -625 ml       Results for orders placed during the hospital encounter of 11/12/24    Adult Transthoracic Echo Complete W/ Cont if Necessary Per Protocol    Interpretation Summary    Left ventricular ejection fraction appears to be 66 - 70%.    Left ventricular wall thickness is consistent with mild concentric hypertrophy.    Left ventricular diastolic function is consistent with (grade Ia w/high LAP) impaired relaxation.    Left atrial volume is moderately increased.    Mild pulmonary hypertension is present.       Review of Systems   All pertinent negatives and positives are as above. All other systems have been reviewed and are negative unless otherwise stated.     Objective    Temp:  [97.8 °F (36.6 °C)-98.3 °F (36.8 °C)] 98 °F (36.7 °C)  Heart Rate:  [68-81] 79  Resp:  [14-18] 18  BP: (104-125)/(30-46) 123/40  Physical Exam  Constitutional:       Appearance: Normal appearance. She is ill-appearing.   HENT:      Head:  Normocephalic and atraumatic.      Mouth/Throat:      Mouth: Mucous membranes are moist.      Pharynx: Oropharynx is clear.   Eyes:      Extraocular Movements: Extraocular movements intact.      Conjunctiva/sclera: Conjunctivae normal.   Cardiovascular:      Rate and Rhythm: Normal rate and regular rhythm.      Pulses: Normal pulses.      Heart sounds: Normal heart sounds.   Pulmonary:      Effort: Pulmonary effort is normal.      Breath sounds: Normal breath sounds.   Abdominal:      General: There is no distension.      Palpations: Abdomen is soft.      Tenderness: There is no abdominal tenderness.   Musculoskeletal:      Cervical back: Normal range of motion and neck supple.      Right lower leg: No edema.      Left lower leg: No edema.      Comments: Generalized weakness   Skin:     General: Skin is warm and dry.      Findings: Bruising and rash (Under right breast) present.      Comments: Bilateral heel protectors in place  Pictures in chart for additional wounds   Neurological:      General: No focal deficit present.      Mental Status: She is oriented to person, place, and time.   Psychiatric:         Attention and Perception: Attention normal.         Mood and Affect: Affect is flat.         Speech: Speech normal.         Behavior: Behavior normal.         Cognition and Memory: Memory is impaired.           Results Review:  I have reviewed the labs, radiology results, and diagnostic studies.    Laboratory Data:   Results from last 7 days   Lab Units 12/19/24  1214 12/19/24  0311 12/18/24  0102   WBC 10*3/mm3  --  5.01 9.85   HEMOGLOBIN g/dL 7.6* 7.5* 10.4*   HEMATOCRIT % 23.9* 24.1* 32.6*   PLATELETS 10*3/mm3  --  156 260        Results from last 7 days   Lab Units 12/19/24  1214 12/19/24  0207 12/18/24  1242 12/18/24  0102   SODIUM mmol/L 144 142 141 138   POTASSIUM mmol/L 3.2* 3.0* 3.2* 3.4*   CHLORIDE mmol/L 118* 115* 111* 103   CO2 mmol/L 15.0* 13.0* 15.0* 15.0*   BUN mg/dL 35* 40* 49* 53*   CREATININE  mg/dL 2.05* 2.18* 2.51* 2.95*   CALCIUM mg/dL 8.4* 8.2* 8.2* 9.4   BILIRUBIN mg/dL  --   --   --  0.2   ALK PHOS U/L  --   --   --  89   ALT (SGPT) U/L  --   --   --  5   AST (SGOT) U/L  --   --   --  14   GLUCOSE mg/dL 106* 77 82 119*       Culture Data:     Microbiology Results (last 10 days)       Procedure Component Value - Date/Time    Blood Culture - Blood, Arm, Right [[id number]]  (Normal) Collected: 12/18/24 0110    Lab Status: Preliminary result Specimen: Blood from Arm, Right Updated: 12/19/24 0216     Blood Culture No growth at 24 hours    COVID PRE-OP / PRE-PROCEDURE SCREENING ORDER (NO ISOLATION) - Swab, Nasal Cavity [[id number]]  (Normal) Collected: 12/18/24 0102    Lab Status: Final result Specimen: Swab from Nasal Cavity Updated: 12/18/24 0132    Narrative:      The following orders were created for panel order COVID PRE-OP / PRE-PROCEDURE SCREENING ORDER (NO ISOLATION) - Swab, Nasal Cavity.  Procedure                               Abnormality         Status                     ---------                               -----------         ------                     COVID-19 and FLU A/B PCR...[[id number]]  Normal              Final result                 Please view results for these tests on the individual orders.    COVID-19 and FLU A/B PCR, 1 HR TAT - Swab, Nasopharynx [[id number]]  (Normal) Collected: 12/18/24 0102    Lab Status: Final result Specimen: Swab from Nasopharynx Updated: 12/18/24 0132     COVID19 Not Detected     Influenza A PCR Not Detected     Influenza B PCR Not Detected    Narrative:      Fact sheet for providers: https://www.fda.gov/media/986439/download    Fact sheet for patients: https://www.fda.gov/media/130191/download    Test performed by PCR.    Urine Culture - Urine, Straight Cath [[id number]]  (Abnormal) Collected: 12/18/24 0102    Lab Status: Preliminary result Specimen: Urine from Straight Cath Updated: 12/19/24 1003     Urine Culture >100,000 CFU/mL Escherichia coli     Narrative:      Colonization of the urinary tract without infection is common. Treatment is discouraged unless the patient is symptomatic, pregnant, or undergoing an invasive urologic procedure.    Blood Culture - Blood, Arm, Right [[id number]]  (Normal) Collected: 12/18/24 0102    Lab Status: Preliminary result Specimen: Blood from Arm, Right Updated: 12/19/24 0216     Blood Culture No growth at 24 hours             Radiology Data:   Imaging Results (Last 7 Days)       Procedure Component Value Units Date/Time    US Renal Bilateral [[id number]] Collected: 12/18/24 1223     Updated: 12/18/24 1316    Addenda:        ADDENDUM: It is noted that the technologist scanned the kidneys outside  of our normal protocol scanning the left kidney first followed by the  right kidney. Therefore, the measurements of the kidneys are reversed.  The left kidney measures 10.0 cm in tgya-fn-[id number] length and the right  kidney 8.9 cm in dbmp-eu-xuif length. Otherwise no change from the  previous exam.     This report was signed and finalized on 12/18/2024 1:13 PM by Dr. Chi Hinds MD.     Signed: 12/18/24 1313 by Chi Hinds MD    Narrative:      RENAL ULTRASOUND COMPLETE 12/18/2024 10:57 AM     REASON FOR EXAM: prior hydronephrosis, recurrent TOMÁS; N17.9-Acute kidney  failure, unspecified; N39.0-Urinary tract infection, site not specified;  A41.9-Sepsis, unspecified organism; L89.90-Pressure ulcer of unspecified  site, unspecified stage; B37.2-Candidiasis of skin and nail;  R41.82-Altered mental status, unspecified; R13.10-Dysphagia, unspecified        COMPARISON: 11/15/2024     TECHNIQUE: Multiple longitudinal and transverse realtime sonographic  images of the kidneys and urinary bladder are obtained.  Images and  report are stored in PACS per institutional and state regulations.     FINDINGS:     RIGHT KIDNEY: 10.0 cm. Normal in size, shape, contour and position. No  solid or cystic masses. The central echo complex is  compact with no  evidence for hydronephrosis. No nephrolithiasis or abnormal perinephric  fluid collections . No hydroureter.     LEFT KIDNEY: 8.9 cm. Normal in size, shape, contour and position. No  solid or cystic masses. The central echo complex is compact with no  evidence for hydronephrosis. No nephrolithiasis or abnormal perinephric  fluid collections . No hydroureter.     PELVIS: There is some debris within the bladder but with no discrete  mass. The urinary bladder is otherwise unremarkable. There is no  surrounding ascites.       Impression:      1. Debris within the bladder without evidence of discrete bladder mass  or bladder wall thickening.  2. Normal sonogram of the kidneys. No mass or hydronephrosis..           This report was signed and finalized on 12/18/2024 12:25 PM by Dr. Chi Hinds MD.       CT Cervical Spine Without Contrast [[id number]] Collected: 12/18/24 0609     Updated: 12/18/24 0718    Narrative:      EXAMINATION: CT CERVICAL SPINE WO CONTRAST-      12/18/2024 12:01 AM     HISTORY: fall; N17.9-Acute kidney failure, unspecified; N39.0-Urinary  tract infection, site not specified; A41.9-Sepsis, unspecified organism;  L89.90-Pressure ulcer of unspecified site, unspecified stage;  B37.2-Candidiasis of skin and nail; R41.82-Altered mental status,  unspecified     In order to have a CT radiation dose as low as reasonably achievable  Automated Exposure Control was utilized for adjustment of the mA and/or  KV according to patient size.     Total DLP = 331.85 mGy.cm     The CT scan of the cervical spine is performed with delved intravenous  or intrathecal contrast enhancement.     Images are acquired in axial plane and subsequent reconstruction in  coronal and sagittal planes.     Comparison is made with the previous study dated 9/4/2023.     There is no evidence of or compression.     No acute displacement or malalignment.     There is mild retrolisthesis of C5 over C6.     The curvature is  maintained.     There is severe arthropathy of the atlantodental articulation with  complete obliteration of the atlantodental space.     There are chronic degenerative changes in the form of anterior and  posterior osteophytes, uncinate spurs and facet arthropathy. There is a  resultant mild narrowing of the left neuroforamina C2-3 and C3-4,  bilateral neuroforaminal stenosis at C4-5 C5-6 and C6-7. There is mild  spinal stenosis at C5-6.     Limited visualized prevertebral soft tissues are unremarkable. Thyroid  gland is incompletely visualized and not well evaluated.     Limited visualized lung apices are unremarkable.       Impression:      1. No acute fracture or malalignment.  2. Cervical spondylosis. Mild retrolisthesis C5 over C6.  3. Multilevel prominent disc osteophyte complexes, uncinate spurs and  facet arthropathy and resultant neural foraminal and spinal canal  stenoses are detailed above.     The above study was initially reviewed and reported by [facility]. I do not  find any discrepancies.                                            This report was signed and finalized on 12/18/2024 7:14 AM by Dr. Marty Suresh MD.       XR Chest 1 View [[id number]] Collected: 12/18/24 0649     Updated: 12/18/24 0653    Narrative:      EXAMINATION: XR CHEST 1 VW-     12/18/2024 12:35 AM     HISTORY: altered mentation; N17.9-Acute kidney failure, unspecified;  N39.0-Urinary tract infection, site not specified; A41.9-Sepsis,  unspecified organism; L89.90-Pressure ulcer of unspecified site,  unspecified stage; B37.2-Candidiasis of skin and nail; R41.82-Altered  mental status, unspecified     A frontal projection of the chest is compared with the previous study  dated 10/5/2024.     The lungs are moderately expanded, significantly improved since the  previous study.     The right lung apex is obscured by the bony and soft tissue structures  of the mandible.     There is no evidence of recent infiltrate, pleural effusion,  pulmonary  congestion or pneumothorax.     Heart size is in the normal range. Atheromatous changes of the tortuous  thoracic aorta is noted.     There is deformity of the right distal clavicle representing previous  healed fracture. There is no acute bony abnormality.       Impression:      1. No active cardiopulmonary disease.        This report was signed and finalized on 12/18/2024 6:50 AM by Dr. Marty Suresh MD.       CT Head Without Contrast [[id number]] Collected: 12/18/24 0603     Updated: 12/18/24 0611    Narrative:      EXAMINATION: CT HEAD WO CONTRAST-      12/18/2024 12:01 AM     HISTORY: altered mentation/fall; N17.9-Acute kidney failure,  unspecified; N39.0-Urinary tract infection, site not specified;  A41.9-Sepsis, unspecified organism; L89.90-Pressure ulcer of unspecified  site, unspecified stage; B37.2-Candidiasis of skin and nail;  R41.82-Altered mental status, unspecified     In order to have a CT radiation dose as low as reasonably achievable  Automated Exposure Control was utilized for adjustment of the mA and/or  KV according to patient size.     Total DLP = 852.88 mGy.cm     The CT scan of the head is performed without intravenous contrast  enhancement.     The images are acquired in axial plane and subsequent reconstruction in  coronal and sagittal planes.     Comparison is made with the previous study dated 10/5/2024.     There are significant motion artifacts which lowers the diagnostic  quality of the study. A subtle lesion may not be evaluated or excluded.     There is no evidence of a mass. There is no midline shift.     There is no evidence of intracranial hemorrhage or hematoma.     There is moderate dilatation of the ventricles, basal cisterns and the  cortical sulci suggestive chronic volume loss, similar to the previous  study.     Scattered areas of chronic white matter ischemia bilaterally noted. The  gray-white matter differentiation is maintained.     Very limited visualized  posterior fossa structures are grossly  unremarkable.     Images reviewed in bone window show no acute displaced or depressed  skull fracture. A subtle nondisplaced fracture may be obscured due to  significant motion artifact. There is opacification of the right  maxillary antrum similar to the previous study representing chronic  inflammatory process. Mastoid air cells are clear.       Impression:      1. Significant motion artifacts may obscure a subtle intracranial  abnormality or lesion. No obvious/visible acute intracranial  abnormality. If clinically warranted further evaluation with repeat CT  scan of the head or MR imaging of the brain when the patient is able to  cooperate.  2. Chronic ischemic and atrophic changes.     The above study was initially reviewed and reported by [facility]. I do not  find any discrepancies.                                            This report was signed and finalized on 12/18/2024 6:08 AM by Dr. Marty Suresh MD.                I have reviewed the patient's current medications.     atorvastatin, 40 mg, Oral, Daily  [Held by provider] carvedilol, 3.125 mg, Oral, BID With Meals  cefTRIAXone, 1,000 mg, Intravenous, Q24H  donepezil, 10 mg, Oral, Nightly  [Held by provider] enoxaparin, 30 mg, Subcutaneous, Daily  hydrocortisone-bacitracin-zinc oxide-nystatin, 1 Application, Topical, 4x Daily  levothyroxine, 50 mcg, Oral, Q AM  pantoprazole, 40 mg, Oral, Q AM  potassium chloride, 40 mEq, Oral, Q3H  QUEtiapine, 25 mg, Oral, Nightly  sertraline, 25 mg, Oral, Daily  sodium chloride, 10 mL, Intravenous, Q12H            Assessment/Plan   Assessment  Active Hospital Problems    Diagnosis     **Metabolic encephalopathy     Acute cystitis     Hypokalemia     Essential hypertension     Acute renal failure superimposed on stage 3 chronic kidney disease        Treatment Plan  1.  Metabolic encephalopathy, fall precautions, PT/OT consults  2.  Acute cystitis, urine cultures pending, continue  Rocephin  3. Essential hypertension, hold carvedilol related to low blood pressure this a.m., continuous cardiac monitoring, vital signs every 4 hours  4.  Acute renal failure superimposed on stage III CKD, normal saline with KCl 20 mEq/L @125/hr, labs in a.m.  5.  Hypokalemia, replace potassium, labs in a.m.    Medical Decision Making  4 problems acute, high complexity, unchanged  1 problem chronic, moderate complexity, worsening/now hypotensive      Number and Complexity of problems: 5  Differential Diagnosis: None    Conditions and Status        Condition is unchanged.     MDM Data  External documents reviewed: None  Cardiac tracing (EKG, telemetry) interpretation: Telemetry normal sinus rhythm 60-79  Radiology interpretation: Reviewed  Labs reviewed: Yes  Any tests that were considered but not ordered: None     Decision rules/scores evaluated (example [id number], Wells, etc): None     Discussed with: Patient, daughter, and Dr. Espitia     Care Planning  Shared decision making: Patient, daughter, and Dr. Espitia  Code status and discussions: Full  Surrogate Decision Maker: Ivonne Liang, daughter    Disposition  Social Determinants of Health that impact treatment or disposition: [facility] at discharge  I expect the patient to be discharged to [facility] in 1 to 2  days.     Electronically signed by APRIL Cody, 12/19/24, 14:33 CST.

## 2024-12-19 NOTE — THERAPY EVALUATION
Patient Name: Jennifer Gomes  : 1940    MRN: [id number]                              Today's Date: 2024       Admit Date: 2024    Visit Dx:     ICD-10-CM ICD-9-CM   1. TOMÁS (acute kidney injury)  N17.9 584.9   2. Acute UTI  N39.0 599.0   3. Sepsis, due to unspecified organism, unspecified whether acute organ dysfunction present  A41.9 038.9     995.91   4. Pressure injury of skin, unspecified injury stage, unspecified location  L89.90 707.00     707.20   5. Candidal intertrigo  B37.2 112.3   6. Altered mental status, unspecified altered mental status type  R41.82 780.97   7. Dysphagia, unspecified type [R13.10]  R13.10 787.20     Patient Active Problem List   Diagnosis    Gastroesophageal reflux disease    Spinal stenosis, lumbar region, without neurogenic claudication    Erosion of vaginal mesh    Adenocarcinoma of endometrium    S/P hysterectomy with oophorectomy    Encounter for follow-up surveillance of endometrial cancer    History of radiation therapy    Non-traumatic rhabdomyolysis    Metabolic encephalopathy    Acute renal failure superimposed on stage 3 chronic kidney disease    Medical non-compliance, does not take narcotics as prescribed    Chronic constipation    Chronic pain syndrome    Chronic prescription opiate use    Anemia    Hypothyroidism (acquired)    Essential hypertension    Venous insufficiency of both lower extremities    Chronic intractable headache    RAFAL (obstructive sleep apnea)    Acute encephalopathy due to UTI    Toxic metabolic encephalopathy    Polypharmacy    Frequent falls    Grade III internal hemorrhoids    External hemorrhoids    Colitis    Moderate malnutrition    Transaminitis    Acute UTI (urinary tract infection)    Polypharmacy    UTI (urinary tract infection)    Dementia    Hypokalemia    Sepsis due to urinary tract infection    Cardiopulmonary arrest    E coli bacteremia    Anemia requiring transfusions    Bilateral hydronephrosis    Acute urinary  retention    TOMÁS (acute kidney injury)    Acute cystitis    Hypercalcemia    Paroxysmal atrial fibrillation     Past Medical History:   Diagnosis Date    Anxiety     Arthritis     Bronchitis     Cancer     uterine    Chronic pain     Depression     Disease of thyroid gland     Fibromyalgia     GERD (gastroesophageal reflux disease)     Headache     History of transfusion     AS     Hyperlipidemia     Hypertension     Incontinence     Insomnia     Leg pain     Lumbar stenosis     Migraines     Peptic ulcer     Restless legs     Sleep apnea     NO C-PAP    UTI (urinary tract infection)     Vaginal bleeding      Past Surgical History:   Procedure Laterality Date    BLADDER REPAIR      MESH HAD TO BE REMOVED IN 2013    BREAST BIOPSY Right 2017    benign    BREAST CYST EXCISION Left 1982    CARDIAC CATHETERIZATION      CARPAL TUNNEL RELEASE      CATARACT EXTRACTION W/ INTRAOCULAR LENS  IMPLANT, BILATERAL      CHOLECYSTECTOMY WITH INTRAOPERATIVE CHOLANGIOGRAM N/A 2023    Procedure: CHOLECYSTECTOMY LAPAROSCOPIC INTRAOPERATIVE CHOLANGIOGRAM;  Surgeon: Gerardo Arciniega MD;  Location: [facility] OR;  Service: General;  Laterality: N/A;    COLONOSCOPY      COLONOSCOPY N/A 10/01/2021    Procedure: COLONOSCOPY WITH ANESTHESIA;  Surgeon: Tom Velasco DO;  Location: [facility] ENDOSCOPY;  Service: Gastroenterology;  Laterality: N/A;  pre: change in bowel habits  post: diverticulosis. hemorrhoids.   Olivia Mora APRN        CYSTECTOMY      D & C HYSTEROSCOPY N/A 2017    Procedure: DILATATION AND CURETTAGE HYSTEROSCOPY;  Surgeon: Shasta Madrigal MD;  Location: [facility] OR;  Service:     DILATION AND CURETTAGE, DIAGNOSTIC / THERAPEUTIC      ENDOSCOPY  2010    Short segment of Arriola's,Moderate chroninc esophagogastritis and negative H.pylori    ENDOSCOPY N/A 2017    Procedure: ESOPHAGOGASTRODUODENOSCOPY WITH ANESTHESIA;  Surgeon: Tom Velasco DO;  Location: [facility] ENDOSCOPY;  Service:      EYE SURGERY      RETINA    HEMORRHOIDECTOMY SIGMOIDOSCOPY N/A 3/21/2023    Procedure: HEMORRHOIDECTOMY WITH EXAM UNDER ANESTHESIA;  Surgeon: Holly Chavez MD;  Location:  [facility] OR;  Service: General;  Laterality: N/A;    HYSTERECTOMY  12/20/2017    ORIF TIBIA/FIBULA FRACTURES Left 2000    TRANSVAGINAL TAPING SUSPENSION N/A 11/06/2017    Procedure: VAGINAL MESH REVISION;  Surgeon: Shasta Madrigal MD;  Location:  [facility] OR;  Service:     VAGINAL MESH REVISION  2013      General Information       Row Name 12/19/24 0931          OT Time and Intention    Document Type evaluation  ED with confusion and fall.  -     Mode of Treatment occupational therapy  -     Patient Effort poor  -       Row Name 12/19/24 0931          General Information    Patient Profile Reviewed yes  -     Prior Level of Function independent:;all household mobility;transfer;bed mobility;ADL's  per pt report but unsure of the accuracy of this information due to ED report of condition pt was found in.  -     Existing Precautions/Restrictions fall  -     Barriers to Rehab medically complex;cognitive status  -       Row Name 12/19/24 0931          Living Environment    People in Home child(sebastian), adult  recent d/c from [facility]  -       Row Name 12/19/24 0931          Cognition    Orientation Status (Cognition) oriented to;person;disoriented to;place;situation;time  -       Row Name 12/19/24 0931          Safety Issues/Impairments Affecting Functional Mobility    Impairments Affecting Function (Mobility) cognition;endurance/activity tolerance;strength;pain  -               User Key  (r) = Recorded By, (t) = Taken By, (c) = Cosigned By      Initials Name Provider Type     Blanka Hubbard OTR/L, CSRS Occupational Therapist                     Mobility/ADL's       Row Name 12/19/24 0931          Bed Mobility    Bed Mobility rolling left;rolling right  -     Rolling Left [place] (Bed Mobility) dependent (less than  25% patient effort)  -     Rolling Right [place] (Bed Mobility) dependent (less than 25% patient effort)  -     Bed Mobility, Safety Issues cognitive deficits limit understanding  -       Row Name 12/19/24 0931          Transfers    Comment, (Transfers) declined all EOB or OOB activity this date.  -[facility] Name 12/19/24 0931          Activities of Daily Living    BADL Assessment/Intervention lower body dressing  -[facility] Name 12/19/24 0931          Lower Body Dressing Assessment/Training    [place] Level (Lower Body Dressing) don;socks;dependent (less than 25% patient effort)  -     Position (Lower Body Dressing) supine  -               User Key  (r) = Recorded By, (t) = Taken By, (c) = Cosigned By      Initials Name Provider Type     Blanka Hubbard OTR/L, CSRS Occupational Therapist                   Obj/Interventions       [facility] Name 12/19/24 0931          Sensory Assessment (Somatosensory)    Sensory Assessment (Somatosensory) UE sensation intact  -     Sensory Assessment per pt report  -[facility] Name 12/19/24 0931          Vision Assessment/Intervention    Visual Impairment/Limitations WFL  -[facility] Name 12/19/24 0931          Range of Motion Comprehensive    General Range of Motion bilateral upper extremity ROM WFL  -[facility] Name 12/19/24 0931          Strength Comprehensive (MMT)    Comment, General Manual Muscle Testing (MMT) Assessment B UE strength grossly 4-/5  -               User Key  (r) = Recorded By, (t) = Taken By, (c) = Cosigned By      Initials Name Provider Type     Blanka Hubbard OTR/L, CSRS Occupational Therapist                   Goals/Plan       [facility] Name 12/19/24 0931          Transfer Goal 1 (OT)    Activity/Assistive Device (Transfer Goal 1, OT) commode, bedside without drop arms  -     [place] Level/Cues Needed (Transfer Goal 1, OT) minimum assist (75% or more patient effort)  -     Time Frame (Transfer Goal 1, OT)  long term goal (LTG);by discharge  -     Progress/Outcome (Transfer Goal 1, OT) new goal  -       Row Name 12/19/24 0931          Toileting Goal 1 (OT)    Activity/Device (Toileting Goal 1, OT) toileting skills, all  -     [place] Level/Cues Needed (Toileting Goal 1, OT) minimum assist (75% or more patient effort)  -     Time Frame (Toileting Goal 1, OT) long term goal (LTG);by discharge  -     Progress/Outcome (Toileting Goal 1, OT) new goal  -       Row Name 12/19/24 0931          Self-Feeding Goal 1 (OT)    Activity/Device (Self-Feeding Goal 1, OT) self-feeding skills, all  -     [place] Level/Cues Needed (Self-Feeding Goal 1, OT) independent  -     Time Frame (Self-Feeding Goal 1, OT) long term goal (LTG);by discharge  -     Progress/Outcomes (Self-Feeding Goal 1, OT) new goal  -JW       Row Name 12/19/24 0931          Therapy Assessment/Plan (OT)    Planned Therapy Interventions (OT) activity tolerance training;adaptive equipment training;BADL retraining;functional balance retraining;transfer/mobility retraining;strengthening exercise;occupation/activity based interventions;patient/caregiver education/training;cognitive/visual perception retraining  -               User Key  (r) = Recorded By, (t) = Taken By, (c) = Cosigned By      Initials Name Provider Type    Blanka Carroll, OTR/L, CSRS Occupational Therapist                   Clinical Impression       Row Name 12/19/24 0931          Pain Assessment    Pre/Posttreatment Pain Comment reports pain with movement. Pt did not attempt to rate pain.  -     Additional Documentation Pain Scale: FACES Pre/Post-Treatment (Group)  -       Row Name 12/19/24 0931          Pain Scale: FACES Pre/Post-Treatment    Pain: FACES Scale, Pretreatment 2-->hurts little bit  -     Posttreatment Pain Rating 2-->hurts little bit  -       Row Name 12/19/24 0931          Plan of Care Review    Plan of Care Reviewed With patient  -BESSY      Outcome Evaluation OT eval completed. Pt presents lethargic with continually vcs to open eyes for OT evaluation. Per nsg, pt had been agitated and uncooperative with care this am which continued throughout session. She reports at baseline recently discharging from a NH and has been residing at home with daughter where she was able to independently care for self. But unsure of the accuracy of this information due to the reports from ED nsg staff and EMS of the condition pt was found in. She was responsive to name and answered all questions appropriately but was minimally participatory during session. She declined all EOB or OOB activity. Was agreeable to ROM, sensation and MMT while laying in bed. This therapist anticipates based off strength and ROM testing, that pt could sit at EOB with minimal assistance. But even with encouragement continued to decline all attempts at activity. Rolling for repositioning complete with Ashish VACA. Pt would greatly benefit from skilled OT services and therapist is hopeful that as cognition resolves, she will be more participatory in session. Recommend d/c to SNF.  -       Row Name 12/19/24 0931          Therapy Assessment/Plan (OT)    Rehab Potential (OT) fair  -     Criteria for Skilled Therapeutic Interventions Met (OT) yes;skilled treatment is necessary  -     Therapy Frequency (OT) 5 times/wk  -     Predicted Duration of Therapy Intervention (OT) 10 days  -       Row Name 12/19/24 0931          Therapy Plan Review/Discharge Plan (OT)    Anticipated Discharge Disposition (OT) skilled nursing facility  -       Row Name 12/19/24 0931          Positioning and Restraints    Pre-Treatment Position in bed  -     Post Treatment Position bed  -JW     In Bed notified nsg;side lying right;fowlers;call light within reach;encouraged to call for assist;exit alarm on;side rails up x3;patient within staff view;RUE elevated;LUE elevated  -               User Key  (r) = Recorded By,  (t) = Taken By, (c) = Cosigned By      Initials Name Provider Type    Blanka Carroll OTR/L, HUSSAIN Occupational Therapist                   Outcome Measures       Row Name 12/19/24 0931          How much help from another is currently needed...    Putting on and taking off regular lower body clothing? 1  -JW     Bathing (including washing, rinsing, and drying) 2  -JW     Toileting (which includes using toilet bed pan or urinal) 2  -JW     Putting on and taking off regular upper body clothing 3  -JW     Taking care of personal grooming (such as brushing teeth) 3  -JW     Eating meals 3  -     AM-PAC 6 Clicks Score (OT) 14  -       Row Name 12/19/24 0850          How much help from another person do you currently need...    Turning from your back to your side while in flat bed without using bedrails? 1  -AA     Moving from lying on back to sitting on the side of a flat bed without bedrails? 1  -AA     Moving to and from a bed to a chair (including a wheelchair)? 1  -AA     Standing up from a chair using your arms (e.g., wheelchair, bedside chair)? 1  -AA     Climbing 3-5 steps with a railing? 1  -AA     To walk in hospital room? 1  -AA     AM-PAC 6 Clicks Score (PT) 6  -       Row Name 12/19/24 0931          Functional Assessment    Outcome Measure Options AM-PAC 6 Clicks Daily Activity (OT)  -               User Key  (r) = Recorded By, (t) = Taken By, (c) = Cosigned By      Initials Name Provider Type    Leonor Michel, RN Registered Nurse    Blanka Carroll OTR/L, CSRS Occupational Therapist                    Occupational Therapy Education       Title: PT OT SLP Therapies (In Progress)       Topic: Occupational Therapy (In Progress)       Point: ADL training (In Progress)       Description:   Instruct learner(s) on proper safety adaptation and remediation techniques during self care or transfers.   Instruct in proper use of assistive devices.                  Learning Progress Summary             Patient Acceptance, E, NR,NL by BESSY at 12/19/2024 1153                      Point: Home exercise program (Not Started)       Description:   Instruct learner(s) on appropriate technique for monitoring, assisting and/or progressing therapeutic exercises/activities.                  Learner Progress:  Not documented in this visit.              Point: Precautions (In Progress)       Description:   Instruct learner(s) on prescribed precautions during self-care and functional transfers.                  Learning Progress Summary            Patient Acceptance, E, NR,NL by BESSY at 12/19/2024 1153                      Point: Body mechanics (In Progress)       Description:   Instruct learner(s) on proper positioning and spine alignment during self-care, functional mobility activities and/or exercises.                  Learning Progress Summary            Patient Acceptance, E, NR,NL by BESSY at 12/19/2024 1153                                      User Key       Initials Effective Dates Name Provider Type Discipline    BESSY 11/15/24 -  Blanka Hubbard, OTHAILEY/L, CSRS Occupational Therapist OT                  OT Recommendation and Plan  Planned Therapy Interventions (OT): activity tolerance training, adaptive equipment training, BADL retraining, functional balance retraining, transfer/mobility retraining, strengthening exercise, occupation/activity based interventions, patient/caregiver education/training, cognitive/visual perception retraining  Therapy Frequency (OT): 5 times/wk  Plan of Care Review  Plan of Care Reviewed With: patient  Outcome Evaluation: OT eval completed. Pt presents lethargic with continually vcs to open eyes for OT evaluation. Per nsg, pt had been agitated and uncooperative with care this am which continued throughout session. She reports at baseline recently discharging from a NH and has been residing at home with daughter where she was able to independently care for self. But unsure of the accuracy  of this information due to the reports from ED nsg staff and EMS of the condition pt was found in. She was responsive to name and answered all questions appropriately but was minimally participatory during session. She declined all EOB or OOB activity. Was agreeable to ROM, sensation and MMT while laying in bed. This therapist anticipates based off strength and ROM testing, that pt could sit at EOB with minimal assistance. But even with encouragement continued to decline all attempts at activity. Rolling for repositioning complete with Max A. Pt would greatly benefit from skilled OT services and therapist is hopeful that as cognition resolves, she will be more participatory in session. Recommend d/c to SNF.     Time Calculation:         Time Calculation- OT       Row Name 12/19/24 0931             Time Calculation- OT    OT Start Time 0931  -JW      OT Stop Time 1010  -      OT Time Calculation (min) 39 min  -JW      OT Received On 12/19/24  -      OT Goal Re-Cert Due Date 12/29/24  -         Untimed Charges    OT Eval/Re-eval Minutes 39  -JW         Total Minutes    Untimed Charges Total Minutes 39  -JW       Total Minutes 39  -JW                User Key  (r) = Recorded By, (t) = Taken By, (c) = Cosigned By      Initials Name Provider Type     Blanka Hubbard OTR/L, CSRS Occupational Therapist                  Therapy Charges for Today       Code Description Service Date Service Provider Modifiers Qty    [id number] HC OT EVAL MOD COMPLEXITY 3 12/19/2024 Blanka Hubbard OTR/L, CSRS GO 1                 ZEESHAN Richardson/L, CSRS  12/19/2024

## 2024-12-19 NOTE — CONSULTS
[facility] Palliative Care Services    Palliative Care Initial Consult   Attending Physician: Cuba Espitia MD  Referring Provider: Cuba Espitia MD     Reason for Referral: assistance with clarification of goals of care and for patient and family  Family/Support: Daughter    Code Status and Medical Interventions: CPR (Attempt to Resuscitate); Full Support   Ordered at: 12/18/24 0245     Level Of Support Discussed With:    Patient     Code Status (Patient has no pulse and is not breathing):    CPR (Attempt to Resuscitate)     Medical Interventions (Patient has pulse or is breathing):    Full Support     Goals of Care: TBD.    HPI:   84 y.o. 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 Garcia, Depression, hypothyroidism, Fibromyalgia, GERD, Hyperlipidemia, Hypertension, Incontinence, Insomnia, restless leg syndrome, obstructive sleep apnea-not using CPAP, Lumbar stenosis, Migraines, and Peptic ulcer.  Hospitalization 11/12-11/18 due to acute kidney injury, acute cystitis, hypercalcemia discharge to SNF; 10/5-10/15 due to sepsis, acute urinary retention, cardiopulmonary arrest, acute kidney injury, E. coli bacteremia, anemia discharged to SNF. Seen by this provider during October 2024 event at which time patient experienced cardiopulmonary arrest with successful resuscitation.  Patient elected to continue full aggressive care interventions including CPR. Patient presented to [facility] on 12/18/2024 related to altered mental status.  According to chart review recently discharged from nursing facility 3 days prior due to increased confusion and fall.  According to chart review patient was minimally responsive and unable to follow commands, nurse charts ER found a  bottle of pills in her vagina identified as Sara IR 15 mg. The medication name on the bottle is not the same. Received Narcan.  APS following.  ED workup shows urinalysis with 3+ leuk esterase/positive nitrite/too numerous to count WBCs/bacteria 4+, additional labs show elevated lactate, acute kidney injury with creatinine 2.95, hypokalemia, anemia.  Urine culture positive for E. coli.  Blood cultures pending.  CT spine shows no acute fracture.  CT head shows no obvious/visible acute intercranial abnormality.  Cerebral microvascular disease and atrophic changes.  Chest imaging shows no acute cardiopulmonary disease.  Started on IV fluids, IV antibiotics.  Bilateral renal ultrasound shows normal sonogram of kidneys.  No mass or hydronephrosis.  Evaluated by speech therapy and started on a modified diet with thin liquid, purée consistency food due to moderate oral dysphagia, one-to-one assistance with meals.  Noted to have significant weight loss currently 144 pound, 175 pound 10/5/2024.  Noted to have hypotension this morning requiring fluid bolus, beta-blocker placed on hold.  Plans to receive electrolyte replacement.  Laying in bed without apparent distress.  Extremely somnolent and oriented to self only.  Minimal oral intake for breakfast, few sips this morning per nursing.  No family at bedside.  Call placed to patient's daughter, Lola @ 2:47 PM at number in chart, no answer, left voicemail and awaiting callback.     Review of Systems   Unable to perform ROS: Mental status change     1- Pain Assessment  PAINAD Breathin-->normal  PAINAD Negative Vocalization: 2-->repeated troubled calling out, loud moaning/groaning, crying  PAINAD Facial Expression: 1-->sad, frightened, frown  PAINAD Body Language: 1-->tense, distressed pacing, fidgeting  PAINAD Consolability: 1-->distracted or reassured by voice/touch  PAINAD Score: 5    Past Medical History:   Diagnosis Date    Anxiety     Arthritis     Bronchitis      Cancer     uterine    Chronic pain     Depression     Disease of thyroid gland     Fibromyalgia     GERD (gastroesophageal reflux disease)     Headache     History of transfusion     AS     Hyperlipidemia     Hypertension     Incontinence     Insomnia     Leg pain     Lumbar stenosis     Migraines     Peptic ulcer     Restless legs     Sleep apnea     NO C-PAP    UTI (urinary tract infection)     Vaginal bleeding      Past Surgical History:   Procedure Laterality Date    BLADDER REPAIR      MESH HAD TO BE REMOVED IN 2013    BREAST BIOPSY Right 2017    benign    BREAST CYST EXCISION Left 1982    CARDIAC CATHETERIZATION      CARPAL TUNNEL RELEASE      CATARACT EXTRACTION W/ INTRAOCULAR LENS  IMPLANT, BILATERAL      CHOLECYSTECTOMY WITH INTRAOPERATIVE CHOLANGIOGRAM N/A 2023    Procedure: CHOLECYSTECTOMY LAPAROSCOPIC INTRAOPERATIVE CHOLANGIOGRAM;  Surgeon: Gerardo Arciniega MD;  Location: [facility] OR;  Service: General;  Laterality: N/A;    COLONOSCOPY      COLONOSCOPY N/A 10/01/2021    Procedure: COLONOSCOPY WITH ANESTHESIA;  Surgeon: Tom Velasco DO;  Location: [facility] ENDOSCOPY;  Service: Gastroenterology;  Laterality: N/A;  pre: change in bowel habits  post: diverticulosis. hemorrhoids.   Olivia Mora APRN        CYSTECTOMY      D & C HYSTEROSCOPY N/A 2017    Procedure: DILATATION AND CURETTAGE HYSTEROSCOPY;  Surgeon: Shasta Madrigal MD;  Location: [facility] OR;  Service:     DILATION AND CURETTAGE, DIAGNOSTIC / THERAPEUTIC      ENDOSCOPY  2010    Short segment of Arriola's,Moderate chroninc esophagogastritis and negative H.pylori    ENDOSCOPY N/A 2017    Procedure: ESOPHAGOGASTRODUODENOSCOPY WITH ANESTHESIA;  Surgeon: Tom Velasco DO;  Location: [facility] ENDOSCOPY;  Service:     EYE SURGERY      RETINA    HEMORRHOIDECTOMY SIGMOIDOSCOPY N/A 3/21/2023    Procedure: HEMORRHOIDECTOMY WITH EXAM UNDER ANESTHESIA;  Surgeon: Holly Chavez MD;  Location: [facility] OR;   Service: General;  Laterality: N/A;    HYSTERECTOMY  12/20/2017    ORIF TIBIA/FIBULA FRACTURES Left 2000    TRANSVAGINAL TAPING SUSPENSION N/A 11/06/2017    Procedure: VAGINAL MESH REVISION;  Surgeon: Shasta Madrigal MD;  Location: [facility] OR;  Service:     VAGINAL MESH REVISION  2013     Social History     Socioeconomic History    Marital status:    Tobacco Use    Smoking status: Never    Smokeless tobacco: Never   Vaping Use    Vaping status: Never Used   Substance and Sexual Activity    Alcohol use: Not Currently     Comment: occasional    Drug use: No    Sexual activity: Defer         Current Facility-Administered Medications:     acetaminophen (TYLENOL) tablet 650 mg, 650 mg, Oral, Q6H PRN, Antonino Braden MD    atorvastatin (LIPITOR) tablet 40 mg, 40 mg, Oral, Daily, Antonino Braden MD, 40 mg at 12/19/24 0850    [Held by provider] carvedilol (COREG) tablet 3.125 mg, 3.125 mg, Oral, BID With Meals, Antonino Braden MD, 3.125 mg at 12/18/24 1848    cefTRIAXone (ROCEPHIN) 1,000 mg in sodium chloride 0.9 % 100 mL MBP, 1,000 mg, Intravenous, Q24H, Antonino Braden MD, Last Rate: 200 mL/hr at 12/18/24 2109, 1,000 mg at 12/18/24 2109    donepezil (ARICEPT) tablet 10 mg, 10 mg, Oral, Nightly, Antonino Braden MD, 10 mg at 12/18/24 2110    [Held by provider] Enoxaparin Sodium (LOVENOX) syringe 30 mg, 30 mg, Subcutaneous, Daily, Antonino Braden MD, 30 mg at 12/18/24 1456    hydrocortisone-bacitracin-zinc oxide-nystatin (MAGIC BARRIER) ointment 1 Application, 1 Application, Topical, 4x Daily, Morenita Paredes APRN, 1 Application at 12/19/24 0856    levothyroxine (SYNTHROID, LEVOTHROID) tablet 50 mcg, 50 mcg, Oral, Q AM, Antonino Braden MD, 50 mcg at 12/19/24 0611    nitroglycerin (NITROSTAT) SL tablet 0.4 mg, 0.4 mg, Sublingual, Q5 Min PRN, Antonino Braden MD    pantoprazole (PROTONIX) EC tablet 40 mg, 40 mg, Oral, Q AM, Antonino Braden MD, 40 mg at 12/19/24 0611    potassium chloride  (MICRO-K/KLOR-CON) CR capsule, 40 mEq, Oral, Q3H, Kierra Schroeder APRN    potassium chloride 10 mEq in 100 mL IVPB, 10 mEq, Intravenous, Q1H, Cuba Espitia MD    QUEtiapine (SEROquel) tablet 25 mg, 25 mg, Oral, Nightly, Antonino Braden MD, 25 mg at 12/18/24 2110    sertraline (ZOLOFT) tablet 25 mg, 25 mg, Oral, Daily, nAtonino Braden MD, 25 mg at 12/19/24 0850    [COMPLETED] Insert Peripheral IV, , , Once **AND** sodium chloride 0.9 % flush 10 mL, 10 mL, Intravenous, PRN, Antonino Braden MD    sodium chloride 0.9 % flush 10 mL, 10 mL, Intravenous, Q12H, Antonino Braden MD, 10 mL at 12/19/24 0901    sodium chloride 0.9 % flush 10 mL, 10 mL, Intravenous, PRN, Antonino Braden MD    sodium chloride 0.9 % infusion 40 mL, 40 mL, Intravenous, PRN, Antonino Braden MD    sodium chloride 0.9 % with KCl 20 mEq/L infusion, 125 mL/hr, Intravenous, Continuous, Cuba Espitia MD    Allergies   Allergen Reactions    Ropinirole Hcl Shortness Of Breath    Codeine Itching and Mental Status Change    Definity [Perflutren Lipid Microsphere] Other (See Comments)     Severe back pain    Ambien [Zolpidem] Other (See Comments)     HYPER     Eszopiclone Other (See Comments)     MAKES PT HYPER     Pregabalin Dizziness    Tizanidine Other (See Comments)     Terrible nightmares     I have utilized all available immediate resources to obtain, update, or review the patient's current medications (including all prescriptions, over-the-counter products, herbals, cannabis/cannabidiol products, and vitamin/mineral/dietary (nutritional) supplements) for name, route of administration, type, dose and frequency.      Intake/Output Summary (Last 24 hours) at 12/19/2024 1000  Last data filed at 12/19/2024 0500  Gross per 24 hour   Intake 0 ml   Output 350 ml   Net -350 ml       Physical Exam:    Diagnostics: Reviewed  BP (!) 104/30 (BP Location: Right arm, Patient Position: Lying)   Pulse 72   Temp 98.2 °F (36.8 °C) (Axillary)    "Resp 18   Ht 157.5 cm (62\")   Wt 65.5 kg (144 lb 6.4 oz)   LMP  (LMP Unknown)   SpO2 93%   BMI 26.41 kg/m²     Vitals and nursing note reviewed.   Constitutional:       Appearance: Not in distress. Chronically ill-appearing.      Comments: Extremely somnolent   Eyes:      General: Lids are normal.   HENT:      Head: Normocephalic.   Pulmonary:      Effort: Pulmonary effort is normal.   Cardiovascular:      Normal rate.   Musculoskeletal:      Cervical back: Neck supple. Skin:     General: Skin is warm.      Comments: Incontinence associated dermatitis, Candidal intertrigo   Genitourinary:     Comments: Smith catheter in place    Patient status: Disease state: Controlled with current treatments.  Current Functional status: Palliative Performance Scale Score: Performance 30% based on the following measures: Ambulation: Totally bed bound, Activity and Evidence of Disease: Unable to do any work, extensive evidence of disease, Self-Care: Total care required,  Intake: Reduced, LOC: Full, drowsy or confusion   Baseline ECOG Status(4) Completely disabled, unable to carry out self-care.  Totally confined to bed or chair.  Nutritional status: Albumin 3.8 Body mass index is 26.41 kg/m².         Hospital Problem List      Metabolic encephalopathy    Acute renal failure superimposed on stage 3 chronic kidney disease    Essential hypertension    Dementia    Acute cystitis    Impression/Problem List:    Alzheimer's dementia open cerebral microvascular disease and atrophy per CT  Metabolic encephalopathy  Acute urinary tract infection, E. 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 Garcia  Depression  Hypothyroidism  Fibromyalgia  GERD  Hyperlipidemia  Hypertension  Incontinence associated dermatitis  Candidal " intertrigo  Bowel and bladder incontinence  Restless leg syndrome  Obstructive sleep apnea-not using CPAP  Lumbar stenosis  Migraines  Advanced age      Recommendations/Plan:  1. plan: Goals of care include CODE STATUS CPR/full support interventions.    Family support: The patient receives support from her children..  Advance Directives: Advance Directive Status: Patient does not have advance directive   POA/Healthcare surrogate-spouse and daughter.    2.  Palliative care encounter  - Prognosis is poor to guarded long-term secondary to Alzheimer's dementia, recent cardiopulmonary arrest event, endometrioid adenocarcinoma, dysphagia, multiple comorbidities, and advanced age.    -Seen by this provider during October/2024 event at which time patient experienced cardiopulmonary arrest with successful resuscitation.  Patient elected to continue full aggressive care interventions including CPR.     -According to chart review patient was minimally responsive and unable to follow commands, nurse charts ER found a bottle of pills in her vagina identified as Sara IR 15 mg. The medication name on the bottle is not the same. Received Narcan.  APS following.    -Urine culture positive for E. coli.  Blood cultures pending.  Started on IV fluids, IV antibiotics.    -Evaluated by speech therapy and started on a modified diet with thin liquid, purée consistency food due to moderate oral dysphagia, one-to-one assistance with meals.    -Noted to have significant weight loss dietitian currently 144#, 175# 10/5/2024.   12/19-Noted to have hypotension this morning requiring fluid bolus, beta-blocker placed on hold.  Plans to receive electrolyte replacement.       12/19-goals of care to be determined.  -Call placed to patient's daughterLola @ 2:47 PM at number in chart, no answer, left voicemail and awaiting callback.    Advance Care Planning    ADDENDUM-2:54 PM spoke with patient's daughterLola via telephone with regard to events  "leading to current hospitalization and recurrent hospitalizations over the last few months.  Explored poor to guarded long-term prognosis secondary to Alzheimer's dementia, recent cardiopulmonary arrest event, endometrioid adenocarcinoma, dysphagia, multiple comorbidities, and advanced age.  Lola is able to reflect on multiple hospitalizations and patient's overall decline, disinterest in oral nutrition/hydration, continued weight loss, disinterest in rehab efforts after return home.  States patient has been adamant not to participate in her own care needs and has reflected her wishes not to continue aggressive care interventions at length with her family.  Based upon medical priority discussion and previous family discussion with patient daughter is elected to de-escalate care measures to no CPR/limited support interventions, no intubation, no vasopressors, no dialysis, no artificial nutrition, no blood products, no cardioversion, no NIPPV.  \"She would not want that\".  Family hopeful to identify potential contributing factors other than UTI for mental status changes and awaiting results from MRI.  We discussed further de-escalation options with focus of comfort and best supportive care directed by hospice in the event of further decline and no further desire for rehospitalization's and aggressive care interventions.  Family to further consider options.  Will plan to follow-up with family/patient on Monday, 12/23 or sooner if needed.  Questions encouraged and support given.  Daughter appreciated for phone call.      Thank you for this consult and allowing us to participate in patient's plan of care. Palliative Care Team will continue to follow patient.     Joana Rush, APRN  12/19/2024  10:00 CST             "

## 2024-12-19 NOTE — PLAN OF CARE
Goal Outcome Evaluation:  Plan of Care Reviewed With: patient           Outcome Evaluation: PT eval complete. She was found lethargic but did awaken and was oriented to self. Per nsg, pt had been agitated and uncooperative with care this am which continued throughout our session. She reports at baseline recently discharging from a NH and has been residing at home with daughter where she was able to independently care for self. Usnure of accuracy however. She declined all EOB or OOB activity. She gives poor effort with ROM And strength assessment in bed. Was dependent for rolling left / right and repositioning in bed. Continues to decline attempts for EOB activity. PT will contiue to make attempts at mobility and strengthening efforts. I am hopeful that once her cognition improves, she may become more participatory in her care. If patient remains non cooperative and refuses therapy, PT will sign off. Recommend d.c SNF.    Anticipated Discharge Disposition (PT): skilled nursing facility

## 2024-12-19 NOTE — PLAN OF CARE
Goal Outcome Evaluation:  Plan of Care Reviewed With: patient           Outcome Evaluation: OT eval completed. Pt presents lethargic with continually vcs to open eyes for OT evaluation. Per nsg, pt had been agitated and uncooperative with care this am which continued throughout session. She reports at baseline recently discharging from a NH and has been residing at home with daughter where she was able to independently care for self. But unsure of the accuracy of this information due to the reports from ED nsg staff and EMS of the condition pt was found in. She was responsive to name and answered all questions appropriately but was minimally participatory during session. She declined all EOB or OOB activity. Was agreeable to ROM, sensation and MMT while laying in bed. This therapist anticipates based off strength and ROM testing, that pt could sit at EOB with minimal assistance. But even with encouragement continued to decline all attempts at activity. Rolling for repositioning complete with Ashish VACA. Pt would greatly benefit from skilled OT services and therapist is hopeful that as cognition resolves, she will be more participatory in session. Recommend d/c to SNF.    Anticipated Discharge Disposition (OT): skilled nursing facility

## 2024-12-19 NOTE — PLAN OF CARE
Goal Outcome Evaluation:   No significant events overnight. Pt has been sleeping all night. Turned every 2 hrs. Pt excoriation is improving. She will respond to her name. Occasionally she will have meaningful conversation. Continue with NS with 20meq. Maintained safety with call light within reach.

## 2024-12-19 NOTE — PLAN OF CARE
Goal Outcome Evaluation:  Plan of Care Reviewed With: patient, caregiver     Progress: improving    Anticipated Discharge Disposition (SLP): skilled nursing facility    Treatment Assessment (SLP): improved (12/19/24 1325)  Treatment Assessment Comments (SLP): see note (12/19/24 1325)  Plan for Continued Treatment (SLP): continue treatment per plan of care (12/19/24 1325)    Patient seen to address diet tolerance.  Patient lethargic upon ST arrival.  Patient diet advanced by nursing staff to soft with chopped meat and thin liquids.  ST contacted patient RN to determine level of alertness throughout the stay.  Patient apparently alerted for breakfast and lunch.  Patient consumed soft solids with chopped meat and thin liquids without difficulty per staff.  ST required extensive verbal and physical cues to awaken patient indicating the need to assure safety with current diet recommendations.  Patient agreeable, only to minimal intake amounts.  Patient able to consume soft solids with thin liquids with no difficulty noted.  Mental status continues to wax and wane.  Supervision during meals to be determined by nursing staff.  Continue with a soft diet and chopped meat with thin liquids.  ST will continue plan of care.    Carolyn Quispe, SLP 12/19/2024 14:29 CST

## 2024-12-19 NOTE — THERAPY EVALUATION
Patient Name: Jennifer Gomes  : 1940    MRN: [id number]                              Today's Date: 2024       Admit Date: 2024    Visit Dx:     ICD-10-CM ICD-9-CM   1. TOMÁS (acute kidney injury)  N17.9 584.9   2. Acute UTI  N39.0 599.0   3. Sepsis, due to unspecified organism, unspecified whether acute organ dysfunction present  A41.9 038.9     995.91   4. Pressure injury of skin, unspecified injury stage, unspecified location  L89.90 707.00     707.20   5. Candidal intertrigo  B37.2 112.3   6. Altered mental status, unspecified altered mental status type  R41.82 780.97   7. Dysphagia, unspecified type [R13.10]  R13.10 787.20   8. Impaired mobility [Z74.09]  Z74.09 799.89     Patient Active Problem List   Diagnosis    Gastroesophageal reflux disease    Spinal stenosis, lumbar region, without neurogenic claudication    Erosion of vaginal mesh    Adenocarcinoma of endometrium    S/P hysterectomy with oophorectomy    Encounter for follow-up surveillance of endometrial cancer    History of radiation therapy    Non-traumatic rhabdomyolysis    Metabolic encephalopathy    Acute renal failure superimposed on stage 3 chronic kidney disease    Medical non-compliance, does not take narcotics as prescribed    Chronic constipation    Chronic pain syndrome    Chronic prescription opiate use    Anemia    Hypothyroidism (acquired)    Essential hypertension    Venous insufficiency of both lower extremities    Chronic intractable headache    RAFAL (obstructive sleep apnea)    Acute encephalopathy due to UTI    Toxic metabolic encephalopathy    Polypharmacy    Frequent falls    Grade III internal hemorrhoids    External hemorrhoids    Colitis    Moderate malnutrition    Transaminitis    Acute UTI (urinary tract infection)    Polypharmacy    UTI (urinary tract infection)    Dementia    Hypokalemia    Sepsis due to urinary tract infection    Cardiopulmonary arrest    E coli bacteremia    Anemia requiring transfusions     Bilateral hydronephrosis    Acute urinary retention    TOMÁS (acute kidney injury)    Acute cystitis    Hypercalcemia    Paroxysmal atrial fibrillation     Past Medical History:   Diagnosis Date    Anxiety     Arthritis     Bronchitis     Cancer     uterine    Chronic pain     Depression     Disease of thyroid gland     Fibromyalgia     GERD (gastroesophageal reflux disease)     Headache     History of transfusion     AS     Hyperlipidemia     Hypertension     Incontinence     Insomnia     Leg pain     Lumbar stenosis     Migraines     Peptic ulcer     Restless legs     Sleep apnea     NO C-PAP    UTI (urinary tract infection)     Vaginal bleeding      Past Surgical History:   Procedure Laterality Date    BLADDER REPAIR      MESH HAD TO BE REMOVED IN 2013    BREAST BIOPSY Right 2017    benign    BREAST CYST EXCISION Left 1982    CARDIAC CATHETERIZATION      CARPAL TUNNEL RELEASE      CATARACT EXTRACTION W/ INTRAOCULAR LENS  IMPLANT, BILATERAL      CHOLECYSTECTOMY WITH INTRAOPERATIVE CHOLANGIOGRAM N/A 2023    Procedure: CHOLECYSTECTOMY LAPAROSCOPIC INTRAOPERATIVE CHOLANGIOGRAM;  Surgeon: Gerardo Arciniega MD;  Location:  [facility] OR;  Service: General;  Laterality: N/A;    COLONOSCOPY      COLONOSCOPY N/A 10/01/2021    Procedure: COLONOSCOPY WITH ANESTHESIA;  Surgeon: Tom Velasco DO;  Location: [facility] END[facility]COPY;  Service: Gastroenterology;  Laterality: N/A;  pre: change in bowel habits  post: diverticulosis. hemorrhoids.   Olivia Mora APRN        CYSTECTOMY      D & C HYSTEROSCOPY N/A 2017    Procedure: DILATATION AND CURETTAGE HYSTEROSCOPY;  Surgeon: Shasta Madrigal MD;  Location:  [facility] OR;  Service:     DILATION AND CURETTAGE, DIAGNOSTIC / THERAPEUTIC      ENDOSCOPY  2010    Short segment of Arriola's,Moderate chroninc esophagogastritis and negative H.pylori    ENDOSCOPY N/A 2017    Procedure: ESOPHAGOGASTRODUODENOSCOPY WITH ANESTHESIA;  Surgeon: Tom Velasco DO;   Location:  [facility];  Service:     EYE SURGERY      RETINA    HEMORRHOIDECTOMY SIGMOIDOSCOPY N/A 3/21/2023    Procedure: HEMORRHOIDECTOMY WITH EXAM UNDER ANESTHESIA;  Surgeon: Holly Chavez MD;  Location:  [facility];  Service: General;  Laterality: N/A;    HYSTERECTOMY  12/20/2017    ORIF TIBIA/FIBULA FRACTURES Left 2000    TRANSVAGINAL TAPING SUSPENSION N/A 11/06/2017    Procedure: VAGINAL MESH REVISION;  Surgeon: Shasta Madrigal MD;  Location:  [facility] OR;  Service:     VAGINAL MESH REVISION  2013      General Information       Row Name 12/19/24 [facility]          Physical Therapy Time and Intention    Document Type evaluation  CC: fall, confused. Dx: AMS, metabolic encephalopathy, TOMÁS, UTI?, hypotensive  -MATTY     Mode of Treatment physical therapy  -MATTY       Row Name 12/19/24 [facility]          General Information    Patient Profile Reviewed yes  -MATTY     Prior Level of Function independent:;bed mobility;transfer;gait  per pt report but unsure of the accuracy of this information due to ED report of condition pt was found in.  -MATTY     Existing Precautions/Restrictions fall  -MATTY     Barriers to Rehab medically complex;previous functional deficit;physical barrier;cognitive status  -MATTY       Row Name 12/19/24 [facility]          Living Environment    People in Home child(sebastian), adult  recent d/c from [facility]  -MATTY       Row Name 12/19/24 09          Cognition    Orientation Status (Cognition) oriented to;person;disoriented to;place;situation;time  -MATTY       Row Name 12/19/24 [facility]          Safety Issues/Impairments Affecting Functional Mobility    Safety Issues Affecting Function (Mobility) ability to follow commands;awareness of need for assistance;friction/shear risk;insight into deficits/self-awareness;judgment;problem-solving;safety precaution awareness;safety precautions follow-through/compliance  -MATTY     Impairments Affecting Function (Mobility) cognition;endurance/activity tolerance;strength;pain;range of motion  (ROM)  -MATTY               User Key  (r) = Recorded By, (t) = Taken By, (c) = Cosigned By      Initials Name Provider Type    Arturo Nina, PT DPT Physical Therapist                   Mobility       Row Name 12/19/24 09          Bed Mobility    Bed Mobility rolling left;rolling right  -MATTY     Rolling Left [place] (Bed Mobility) dependent (less than 25% patient effort)  -MATTY     Rolling Right [place] (Bed Mobility) dependent (less than 25% patient effort)  -MATTY     Assistive Device (Bed Mobility) bed rails;repositioning sheet  -MATTY       Row Name 12/19/24 09          Transfers    Comment, (Transfers) declined all EOB or OOB activity this date  -MATTY               User Key  (r) = Recorded By, (t) = Taken By, (c) = Cosigned By      Initials Name Provider Type    Arturo Nina, PT DPT Physical Therapist                   Obj/Interventions       Row Name 12/19/24 0936          Range of Motion Comprehensive    Comment, General Range of Motion B LE AROM impaired 90%  -MATTY       Row Name 12/19/24 09          Strength Comprehensive (MMT)    Comment, General Manual Muscle Testing (MMT) Assessment B LE grossly 2-/5  -MATTY       Row Name 12/19/24 0936          Sensory Assessment (Somatosensory)    Sensory Assessment (Somatosensory) LE sensation intact  -MATTY               User Key  (r) = Recorded By, (t) = Taken By, (c) = Cosigned By      Initials Name Provider Type    Arturo Nina, PT DPT Physical Therapist                   Goals/Plan       Row Name 12/19/24 0936          Bed Mobility Goal 1 (PT)    Activity/Assistive Device (Bed Mobility Goal 1, PT) sit to supine/supine to sit  -MATTY     [place] Level/Cues Needed (Bed Mobility Goal 1, PT) moderate assist (50-74% patient effort)  -MATTY     Time Frame (Bed Mobility Goal 1, PT) long term goal (LTG);10 days  -MATTY     Progress/Outcomes (Bed Mobility Goal 1, PT) new goal  -MATTY       Row Name 12/19/24 0936          Transfer Goal 1 (PT)     Activity/Assistive Device (Transfer Goal 1, PT) sit-to-stand/stand-to-sit;bed-to-chair/chair-to-bed  -MATTY     [place] Level/Cues Needed (Transfer Goal 1, PT) moderate assist (50-74% patient effort)  -MATTY     Time Frame (Transfer Goal 1, PT) long term goal (LTG);10 days  -MATTY     Progress/Outcome (Transfer Goal 1, PT) new goal  -MATTY       Row Name 12/19/24 09          Therapy Assessment/Plan (PT)    Planned Therapy Interventions (PT) bed mobility training;transfer training;gait training;balance training;home exercise program;patient/family education;postural re-education;ROM (range of motion);strengthening;stretching  -MATTY               User Key  (r) = Recorded By, (t) = Taken By, (c) = Cosigned By      Initials Name Provider Type    Arturo Nina, PT DPT Physical Therapist                   Clinical Impression       Row Name 12/19/24 [facility]          Pain    Pain Side/Orientation generalized  -MATTY     Pain Management Interventions exercise or physical activity utilized  -MATTY     Additional Documentation Pain Scale: FACES Pre/Post-Treatment (Group)  -MATTY       Row Name 12/19/24 [facility]          Pain Scale: FACES Pre/Post-Treatment    Pain: FACES Scale, Pretreatment 2-->hurts little bit  -MATTY       Row Name 12/19/24 09          Plan of Care Review    Plan of Care Reviewed With patient  -MATTY     Outcome Evaluation PT eval complete. She was found lethargic but did awaken and was oriented to self. Per nsg, pt had been agitated and uncooperative with care this am which continued throughout our session. She reports at baseline recently discharging from a NH and has been residing at home with daughter where she was able to independently care for self. Usnure of accuracy however. She declined all EOB or OOB activity. She gives poor effort with ROM And strength assessment in bed. Was dependent for rolling left / right and repositioning in bed. Continues to decline attempts for EOB activity. PT will contiue to make attempts at  mobility and strengthening efforts. I am hopeful that once her cognition improves, she may become more participatory in her care. If patient remains non cooperative and refuses therapy, PT will sign off. Recommend d.c SNF.  -MATTY       Row Name 12/19/24 0936          Therapy Assessment/Plan (PT)    Patient/Family Therapy Goals Statement (PT) did not state  -MATTY     Rehab Potential (PT) poor  -MATTY     Criteria for Skilled Interventions Met (PT) yes;meets criteria;skilled treatment is necessary  -MATTY     Therapy Frequency (PT) daily  -MATTY     Predicted Duration of Therapy Intervention (PT) unti ld.c  -MATTY       Row Name 12/19/24 0936          Positioning and Restraints    Pre-Treatment Position in bed  -MATTY     Post Treatment Position bed  -MATTY     In Bed notified nsg;fowlers;side lying right;call light within reach;encouraged to call for assist;exit alarm on  -MATTY               User Key  (r) = Recorded By, (t) = Taken By, (c) = Cosigned By      Initials Name Provider Type    MATTY Arturo Walters, PT DPT Physical Therapist                   Outcome Measures       Row Name 12/19/24 0936 12/19/24 0850       How much help from another person do you currently need...    Turning from your back to your side while in flat bed without using bedrails? 2  -MATTY 1  -AA    Moving from lying on back to sitting on the side of a flat bed without bedrails? 1  -MATTY 1  -AA    Moving to and from a bed to a chair (including a wheelchair)? 1  -MATTY 1  -AA    Standing up from a chair using your arms (e.g., wheelchair, bedside chair)? 1  -MATTY 1  -AA    Climbing 3-5 steps with a railing? 1  -MATTY 1  -AA    To walk in hospital room? 1  -MATTY 1  -AA    AM-PAC 6 Clicks Score (PT) 7  -MATTY 6  -AA    Highest Level of Mobility Goal 2 --> Bed activities/dependent transfer  -MATTY 2 --> Bed activities/dependent transfer  -AA      Row Name 12/19/24 0936 12/19/24 0931       Functional Assessment    Outcome Measure Options AM-PAC 6 Clicks Basic Mobility (PT)  -MATTY AM-PAC 6  Clicks Daily Activity (OT)  -BESSY              User Key  (r) = Recorded By, (t) = Taken By, (c) = Cosigned By      Initials Name Provider Type    Arturo Nina, PT DPT Physical Therapist    Leonor Michel, RN Registered Nurse    Blanka Carroll, OTR/L, CSRS Occupational Therapist                                 Physical Therapy Education       Title: PT OT SLP Therapies (In Progress)       Topic: Physical Therapy (In Progress)       Point: Mobility training (In Progress)       Learning Progress Summary            Patient Nonacceptance, E, NR,NL,RT by MATTY at 12/19/2024 0936    Comment: benefits of activity, progression of PT                      Point: Home exercise program (Not Started)       Learner Progress:  Not documented in this visit.              Point: Body mechanics (Not Started)       Learner Progress:  Not documented in this visit.              Point: Precautions (Not Started)       Learner Progress:  Not documented in this visit.                              User Key       Initials Effective Dates Name Provider Type Discipline    MATTY 02/03/23 -  Arturo Walters PT DPT Physical Therapist PT                  PT Recommendation and Plan  Planned Therapy Interventions (PT): bed mobility training, transfer training, gait training, balance training, home exercise program, patient/family education, postural re-education, ROM (range of motion), strengthening, stretching  Outcome Evaluation: PT eval complete. She was found lethargic but did awaken and was oriented to self. Per nsg, pt had been agitated and uncooperative with care this am which continued throughout our session. She reports at baseline recently discharging from a NH and has been residing at home with daughter where she was able to independently care for self. Usnure of accuracy however. She declined all EOB or OOB activity. She gives poor effort with ROM And strength assessment in bed. Was dependent for rolling left / right and  repositioning in bed. Continues to decline attempts for EOB activity. PT will contiue to make attempts at mobility and strengthening efforts. I am hopeful that once her cognition improves, she may become more participatory in her care. If patient remains non cooperative and refuses therapy, PT will sign off. Recommend d.c SNF.     Time Calculation:         PT Charges       Row Name 12/19/24 1243             Time Calculation    Start Time 0936  -MATTY      Stop Time 1004  + 10 minutes w chart review and attempted eval yesterday. 38 total minutes  -MATTY      Time Calculation (min) 28 min  -MATTY      PT Received On 12/19/24  -MATTY      PT Goal Re-Cert Due Date 12/29/24  -MATTY         Untimed Charges    PT Eval/Re-eval Minutes 38  -MATTY         Total Minutes    Untimed Charges Total Minutes 38  -MATTY       Total Minutes 38  -MATTY                User Key  (r) = Recorded By, (t) = Taken By, (c) = Cosigned By      Initials Name Provider Type    Arturo Nina, PT DPT Physical Therapist                  Therapy Charges for Today       Code Description Service Date Service Provider Modifiers Qty    [id number] HC PT EVAL MOD COMPLEXITY 3 12/19/2024 Arturo Walters PT DPT GP 1            PT G-Codes  Outcome Measure Options: AM-PAC 6 Clicks Basic Mobility (PT)  AM-PAC 6 Clicks Score (PT): 7  AM-PAC 6 Clicks Score (OT): 14  PT Discharge Summary  Anticipated Discharge Disposition (PT): skilled nursing facility    Arturo Walters, PT DPT  12/19/2024

## 2024-12-19 NOTE — THERAPY TREATMENT NOTE
Acute Care - Speech Language Pathology   Swallow Treatment Note [facility]     Patient Name: Jennifer Gomes  : 1940  MRN: [id number]  Today's Date: 2024               Admit Date: 2024    Patient seen to address diet tolerance.  Patient lethargic upon ST arrival.  Patient diet advanced by nursing staff to soft with chopped meat and thin liquids.  ST contacted patient RN to determine level of alertness throughout the stay.  Patient apparently alerted for breakfast and lunch.  Patient consumed soft solids with chopped meat and thin liquids without difficulty per staff.  ST required extensive verbal and physical cues to awaken patient indicating the need to assure safety with current diet recommendations.  Patient agreeable, only to minimal intake amounts.  Patient able to consume soft solids with thin liquids with no difficulty noted.  Mental status continues to wax and wane.  Supervision during meals to be determined by nursing staff.  Continue with a soft diet and chopped meat with thin liquids.  ST will continue plan of care.    PACO Gutierrez 2024 14:30 CST      Visit Dx:     ICD-10-CM ICD-9-CM   1. TOMÁS (acute kidney injury)  N17.9 584.9   2. Acute UTI  N39.0 599.0   3. Sepsis, due to unspecified organism, unspecified whether acute organ dysfunction present  A41.9 038.9     995.91   4. Pressure injury of skin, unspecified injury stage, unspecified location  L89.90 707.00     707.20   5. Candidal intertrigo  B37.2 112.3   6. Altered mental status, unspecified altered mental status type  R41.82 780.97   7. Dysphagia, unspecified type [R13.10]  R13.10 787.20   8. Impaired mobility [Z74.09]  Z74.09 799.89     Patient Active Problem List   Diagnosis    Gastroesophageal reflux disease    Spinal stenosis, lumbar region, without neurogenic claudication    Erosion of vaginal mesh    Adenocarcinoma of endometrium    S/P hysterectomy with oophorectomy    Encounter for follow-up surveillance of  endometrial cancer    History of radiation therapy    Non-traumatic rhabdomyolysis    Metabolic encephalopathy    Acute renal failure superimposed on stage 3 chronic kidney disease    Medical non-compliance, does not take narcotics as prescribed    Chronic constipation    Chronic pain syndrome    Chronic prescription opiate use    Anemia    Hypothyroidism (acquired)    Essential hypertension    Venous insufficiency of both lower extremities    Chronic intractable headache    RAFAL (obstructive sleep apnea)    Acute encephalopathy due to UTI    Toxic metabolic encephalopathy    Polypharmacy    Frequent falls    Grade III internal hemorrhoids    External hemorrhoids    Colitis    Moderate malnutrition    Transaminitis    Acute UTI (urinary tract infection)    Polypharmacy    UTI (urinary tract infection)    Dementia    Hypokalemia    Sepsis due to urinary tract infection    Cardiopulmonary arrest    E coli bacteremia    Anemia requiring transfusions    Bilateral hydronephrosis    Acute urinary retention    TOMÁS (acute kidney injury)    Acute cystitis    Hypercalcemia    Paroxysmal atrial fibrillation     Past Medical History:   Diagnosis Date    Anxiety     Arthritis     Bronchitis     Cancer     uterine    Chronic pain     Depression     Disease of thyroid gland     Fibromyalgia     GERD (gastroesophageal reflux disease)     Headache     History of transfusion     AS     Hyperlipidemia     Hypertension     Incontinence     Insomnia     Leg pain     Lumbar stenosis     Migraines     Peptic ulcer     Restless legs     Sleep apnea     NO C-PAP    UTI (urinary tract infection)     Vaginal bleeding      Past Surgical History:   Procedure Laterality Date    BLADDER REPAIR      MESH HAD TO BE REMOVED IN 2013    BREAST BIOPSY Right 2017    benign    BREAST CYST EXCISION Left     CARDIAC CATHETERIZATION      CARPAL TUNNEL RELEASE      CATARACT EXTRACTION W/ INTRAOCULAR LENS  IMPLANT, BILATERAL      CHOLECYSTECTOMY  WITH INTRAOPERATIVE CHOLANGIOGRAM N/A 9/7/2023    Procedure: CHOLECYSTECTOMY LAPAROSCOPIC INTRAOPERATIVE CHOLANGIOGRAM;  Surgeon: Gerardo Arciniega MD;  Location:  [facility] OR;  Service: General;  Laterality: N/A;    COLONOSCOPY      COLONOSCOPY N/A 10/01/2021    Procedure: COLONOSCOPY WITH ANESTHESIA;  Surgeon: Tom Velasco DO;  Location:  [facility] ENDOSCOPY;  Service: Gastroenterology;  Laterality: N/A;  pre: change in bowel habits  post: diverticulosis. hemorrhoids.   Olivia Mora APRN        CYSTECTOMY      D & C HYSTEROSCOPY N/A 11/06/2017    Procedure: DILATATION AND CURETTAGE HYSTEROSCOPY;  Surgeon: Shasta Madrigal MD;  Location: [facility] OR;  Service:     DILATION AND CURETTAGE, DIAGNOSTIC / THERAPEUTIC  2008    ENDOSCOPY  09/23/2010    Short segment of Arriola's,Moderate chroninc esophagogastritis and negative H.pylori    ENDOSCOPY N/A 09/25/2017    Procedure: ESOPHAGOGASTRODUODENOSCOPY WITH ANESTHESIA;  Surgeon: Tom Velasco DO;  Location: [facility] ENDOSCOPY;  Service:     EYE SURGERY      RETINA    HEMORRHOIDECTOMY SIGMOIDOSCOPY N/A 3/21/2023    Procedure: HEMORRHOIDECTOMY WITH EXAM UNDER ANESTHESIA;  Surgeon: Holly Chavez MD;  Location: [facility] OR;  Service: General;  Laterality: N/A;    HYSTERECTOMY  12/20/2017    ORIF TIBIA/FIBULA FRACTURES Left 2000    TRANSVAGINAL TAPING SUSPENSION N/A 11/06/2017    Procedure: VAGINAL MESH REVISION;  Surgeon: Shasta Madrigal MD;  Location: [facility] OR;  Service:     VAGINAL MESH REVISION  2013       SLP Recommendation and Plan     SLP Diet Recommendation: soft to chew textures, chopped, thin liquids (12/19/24 1325)  Recommended Precautions and Strategies: upright posture during/after eating, small bites of food and sips of liquid, alternate between small bites of food and sips of liquid, general aspiration precautions, assist with feeding (12/19/24 1325)  SLP Rec. for Method of Medication Administration: meds crushed, with puree (12/19/24 1325)      Monitor for Signs of Aspiration: yes, cough, gurgly voice, throat clearing, pneumonia, notify SLP if any concerns (12/19/24 1325)        Anticipated Discharge Disposition (SLP): skilled nursing facility (12/19/24 1325)     Therapy Frequency (Swallow): PRN (12/19/24 1325)  Predicted Duration Therapy Intervention (Days): until discharge (12/19/24 1325)  Oral Care Recommendations: Oral Care before breakfast, after meals and PRN (12/19/24 1325)        Daily Summary of Progress (SLP): progress toward functional goals is good (12/19/24 1325)               Treatment Assessment (SLP): improved (12/19/24 1325)  Treatment Assessment Comments (SLP): see note (12/19/24 1325)  Plan for Continued Treatment (SLP): continue treatment per plan of care (12/19/24 1325)         Progress: improving      SWALLOW EVALUATION (Last 72 Hours)       SLP Adult Swallow Evaluation       Row Name 12/19/24 1325 12/18/24 0895                Rehab Evaluation    Document Type therapy note (daily note)  -MD evaluation  -MB       Subjective Information no complaints  -MD fatigue  -MB       Patient Observations lethargic  -MD lethargic  -MB       Patient/Family/Caregiver Comments/Observations -- PCA present  -MB       Patient Effort good  -MD --          General Information    Patient Profile Reviewed -- yes  -MB       Pertinent History Of Current Problem -- AMS, metabolic encephalopathy, TOMÁS, HTN, HLD, RAFAL, fall.  -MB       Current Method of Nutrition -- regular textures;thin liquids  -MB       Precautions/Limitations, Vision -- difficult to assess  -MB       Precautions/Limitations, Hearing -- hearing impairment, bilaterally  -MB       Prior Level of Function-Communication -- unknown  -MB       Prior Level of Function-Swallowing -- no diet consistency restrictions  -MB       Plans/Goals Discussed with -- patient  -MB       Barriers to Rehab -- cognitive status  -MB       Patient's Goals for Discharge -- patient did not state  -MB          Pain     Additional Documentation -- Pain Scale: FACES Pre/Post-Treatment (Group)  -MB          Pain Scale: FACES Pre/Post-Treatment    Pain: FACES Scale, Pretreatment 0-->no hurt  -MD 0-->no hurt  -MB       Posttreatment Pain Rating 0-->no hurt  -MD --          Oral Motor Structure and Function    Dentition Assessment -- natural, present and adequate  -MB       Secretion Management -- WNL/WFL  -MB       Mucosal Quality -- dry  -MB          Oral Musculature and Cranial Nerve Assessment    Oral Motor General Assessment -- unable to assess  -MB          General Eating/Swallowing Observations    Eating/Swallowing Skills -- fed by SLP  -MB       Positioning During Eating -- upright in bed  -MB       Utensils Used -- straw  -MB       Consistencies Trialed -- thin liquids  -MB          Clinical Swallow Eval    Oral Prep Phase -- impaired  -MB       Oral Transit -- WFL  -MB       Oral Residue -- WFL  -MB       Pharyngeal Phase -- no overt signs/symptoms of pharyngeal impairment  -MB       Esophageal Phase -- unremarkable  -MB       Clinical Swallow Evaluation Summary -- See note  -MB          Oral Prep Concerns    Oral Prep Concerns -- incomplete or weak lip closure around spoon  -MB       Incomplete or Weak Lip Closure Around Spoon -- thin  -MB          SLP Evaluation Clinical Impression    SLP Swallowing Diagnosis -- moderate;oral dysphagia;R/O pharyngeal dysphagia  -MB       Functional Impact -- risk of aspiration/pneumonia;risk of malnutrition;risk of dehydration  -MB       Rehab Potential/Prognosis, Swallowing -- adequate, monitor progress closely  -MB       Swallow Criteria for Skilled Therapeutic Interventions Met -- demonstrates skilled criteria  -MB          SLP Treatment Clinical Impressions    Treatment Assessment (SLP) improved  -MD --       Treatment Assessment Comments (SLP) see note  -MD --       Daily Summary of Progress (SLP) progress toward functional goals is good  -MD --       Barriers to Overall Progress (SLP)  Lethargy  -MD --       Plan for Continued Treatment (SLP) continue treatment per plan of care  -MD --       Care Plan Review risks/benefits reviewed;current/potential barriers reviewed;patient/other agree to care plan  -MD --       Care Plan Review, Other Participant(s) caregiver  -MD --          Recommendations    Therapy Frequency (Swallow) PRN  -MD PRN  -MB       Predicted Duration Therapy Intervention (Days) until discharge  -MD until discharge  -MB       SLP Diet Recommendation soft to chew textures;chopped;thin liquids  -MD vogel;thin liquids  -MB       Recommended Precautions and Strategies upright posture during/after eating;small bites of food and sips of liquid;alternate between small bites of food and sips of liquid;general aspiration precautions;assist with feeding  -MD upright posture during/after eating;small bites of food and sips of liquid;alternate between small bites of food and sips of liquid;general aspiration precautions;assist with feeding  -MB       Oral Care Recommendations Oral Care before breakfast, after meals and PRN  -MD Oral Care before breakfast, after meals and PRN  -MB       SLP Rec. for Method of Medication Administration meds crushed;with lela  -MD meds crushed;with lela  -MB       Monitor for Signs of Aspiration yes;cough;gurgly voice;throat clearing;pneumonia;notify SLP if any concerns  -MD yes;cough;gurgly voice;throat clearing;pneumonia;notify SLP if any concerns  -MB       Anticipated Discharge Disposition (SLP) skilled nursing facility  -MD skilled nursing facility  -MB          Swallow Goals (SLP)    Swallow LTGs Swallow Long Term Goal (free text)  -MD Swallow Long Term Goal (free text)  -MB       Swallow STGs diet tolerance goal selection (SLP)  -MD diet tolerance goal selection (SLP)  -MB       Diet Tolerance Goal Selection (SLP) Patient will tolerate trials of  -MD Patient will tolerate trials of  -MB          (LTG) Swallow    (LTG) Swallow Pt will tolerate least  restrictive diet with no overt s/s of aspiration.  -MD Pt will tolerate least restrictive diet with no overt s/s of aspiration.  -MB       [place] (Swallow Long Term Goal) independently (over 90% accuracy)  -MD independently (over 90% accuracy)  -MB       Time Frame (Swallow Long Term Goal) by discharge  -MD by discharge  -MB       Barriers (Swallow Long Term Goal) n/a  -MD n/a  -MB       Progress/Outcomes (Swallow Long Term Goal) good progress toward goal  -MD new goal  -MB       Comment (Swallow Long Term Goal) n/a  -MD n/a  -MB          (STG) Patient will tolerate trials of    Consistencies Trialed (Tolerate trials) regular textures;soft to chew (whole) textures;pureed textures;thin liquids  -MD regular textures;soft to chew (whole) textures;pureed textures;thin liquids  -MB       Desired Outcome (Tolerate trials) without signs/symptoms of aspiration;with adequate oral prep/transit/clearance  -MD without signs/symptoms of aspiration;with adequate oral prep/transit/clearance  -MB       [place] (Tolerate trials) independently (over 90% accuracy)  -MD independently (over 90% accuracy)  -MB       Time Frame (Tolerate trials) by discharge  -MD by discharge  -MB       Progress/Outcomes (Tolerate trials) good progress toward goal  -MD new goal  -MB       Comment (Tolerate trials) -- n/a  -MB                 User Key  (r) = Recorded By, (t) = Taken By, (c) = Cosigned By      Initials Name Effective Dates    Serena Gan, CCC-SLP 02/03/23 -     Carolyn Dacosta, SLP 06/21/22 -                     EDUCATION  The patient has been educated in the following areas:   Dysphagia (Swallowing Impairment).        SLP GOALS       Row Name 12/19/24 1325 12/18/24 0847          (LTG) Swallow    (LTG) Swallow Pt will tolerate least restrictive diet with no overt s/s of aspiration.  -MD Pt will tolerate least restrictive diet with no overt s/s of aspiration.  -MB     [place] (Swallow Long Term Goal) independently  (over 90% accuracy)  -MD independently (over 90% accuracy)  -MB     Time Frame (Swallow Long Term Goal) by discharge  -MD by discharge  -MB     Barriers (Swallow Long Term Goal) n/a  -MD n/a  -MB     Progress/Outcomes (Swallow Long Term Goal) good progress toward goal  -MD new goal  -MB     Comment (Swallow Long Term Goal) n/a  -MD n/a  -MB        (STG) Patient will tolerate trials of    Consistencies Trialed (Tolerate trials) regular textures;soft to chew (whole) textures;pureed textures;thin liquids  -MD regular textures;soft to chew (whole) textures;pureed textures;thin liquids  -MB     Desired Outcome (Tolerate trials) without signs/symptoms of aspiration;with adequate oral prep/transit/clearance  -MD without signs/symptoms of aspiration;with adequate oral prep/transit/clearance  -MB     [place] (Tolerate trials) independently (over 90% accuracy)  -MD independently (over 90% accuracy)  -MB     Time Frame (Tolerate trials) by discharge  -MD by discharge  -MB     Progress/Outcomes (Tolerate trials) good progress toward goal  -MD new goal  -MB     Comment (Tolerate trials) -- n/a  -MB               User Key  (r) = Recorded By, (t) = Taken By, (c) = Cosigned By      Initials Name Provider Type    Serena Gan, CCC-SLP Speech and Language Pathologist    Carolyn Dacosta, SLP Speech and Language Pathologist                         Time Calculation:    Time Calculation- SLP       Row Name 12/19/24 1429             Time Calculation- SLP    SLP Start Time 1325  -MD      SLP Stop Time 1403  -MD      SLP Time Calculation (min) 38 min  -MD      SLP Received On 12/19/24  -MD         Untimed Charges    52497-MB Treatment Swallow Minutes 38  -MD         Total Minutes    Untimed Charges Total Minutes 38  -MD       Total Minutes 38  -MD                User Key  (r) = Recorded By, (t) = Taken By, (c) = Cosigned By      Initials Name Provider Type    Carolyn Dacosta, SLP Speech and Language Pathologist                     Therapy Charges for Today       Code Description Service Date Service Provider Modifiers Qty    [id number] HC ST TREATMENT SWALLOW 3 12/19/2024 Carolyn Quispe, SLP GN 1                 Carolyn Quispe, SLP  12/19/2024

## 2024-12-20 LAB
AMPHET+METHAMPHET UR QL: NEGATIVE
AMPHETAMINES UR QL: NEGATIVE
ANION GAP SERPL CALCULATED.3IONS-SCNC: 8 MMOL/L (ref 5–15)
BACTERIA SPEC AEROBE CULT: ABNORMAL
BARBITURATES UR QL SCN: NEGATIVE
BENZODIAZ UR QL SCN: POSITIVE
BUN SERPL-MCNC: 27 MG/DL (ref 8–23)
BUN/CREAT SERPL: 16.2 (ref 7–25)
BUPRENORPHINE SERPL-MCNC: NEGATIVE NG/ML
CALCIUM SPEC-SCNC: 8.3 MG/DL (ref 8.6–10.5)
CANNABINOIDS SERPL QL: NEGATIVE
CHLORIDE SERPL-SCNC: 122 MMOL/L (ref 98–107)
CO2 SERPL-SCNC: 15 MMOL/L (ref 22–29)
COCAINE UR QL: NEGATIVE
CREAT SERPL-MCNC: 1.67 MG/DL (ref 0.57–1)
DEPRECATED RDW RBC AUTO: 57.1 FL (ref 37–54)
EGFRCR SERPLBLD CKD-EPI 2021: 30.5 ML/MIN/1.73
ERYTHROCYTE [DISTWIDTH] IN BLOOD BY AUTOMATED COUNT: 16.6 % (ref 12.3–15.4)
FENTANYL UR-MCNC: NEGATIVE NG/ML
GLUCOSE SERPL-MCNC: 82 MG/DL (ref 65–99)
HCT VFR BLD AUTO: 24.4 % (ref 34–46.6)
HGB BLD-MCNC: 7.7 G/DL (ref 12–15.9)
MCH RBC QN AUTO: 29.8 PG (ref 26.6–33)
MCHC RBC AUTO-ENTMCNC: 31.6 G/DL (ref 31.5–35.7)
MCV RBC AUTO: 94.6 FL (ref 79–97)
METHADONE UR QL SCN: NEGATIVE
OPIATES UR QL: NEGATIVE
OXYCODONE UR QL SCN: POSITIVE
PCP UR QL SCN: NEGATIVE
PLATELET # BLD AUTO: 118 10*3/MM3 (ref 140–450)
PMV BLD AUTO: 10.8 FL (ref 6–12)
POTASSIUM SERPL-SCNC: 4.8 MMOL/L (ref 3.5–5.2)
RBC # BLD AUTO: 2.58 10*6/MM3 (ref 3.77–5.28)
SODIUM SERPL-SCNC: 145 MMOL/L (ref 136–145)
TRICYCLICS UR QL SCN: NEGATIVE
WBC NRBC COR # BLD AUTO: 3.93 10*3/MM3 (ref 3.4–10.8)

## 2024-12-20 PROCEDURE — 25010000002 CEFTRIAXONE PER 250 MG: Performed by: INTERNAL MEDICINE

## 2024-12-20 PROCEDURE — 92526 ORAL FUNCTION THERAPY: CPT

## 2024-12-20 PROCEDURE — 97530 THERAPEUTIC ACTIVITIES: CPT

## 2024-12-20 PROCEDURE — 80048 BASIC METABOLIC PNL TOTAL CA: CPT | Performed by: NURSE PRACTITIONER

## 2024-12-20 PROCEDURE — 80307 DRUG TEST PRSMV CHEM ANLYZR: CPT | Performed by: HOSPITALIST

## 2024-12-20 PROCEDURE — 85027 COMPLETE CBC AUTOMATED: CPT | Performed by: NURSE PRACTITIONER

## 2024-12-20 RX ADMIN — ACETAMINOPHEN 650 MG: 325 TABLET ORAL at 17:06

## 2024-12-20 RX ADMIN — SERTRALINE HYDROCHLORIDE 25 MG: 50 TABLET ORAL at 08:53

## 2024-12-20 RX ADMIN — Medication 10 ML: at 20:57

## 2024-12-20 RX ADMIN — ATORVASTATIN CALCIUM 40 MG: 40 TABLET, FILM COATED ORAL at 08:53

## 2024-12-20 RX ADMIN — PANTOPRAZOLE SODIUM 40 MG: 40 TABLET, DELAYED RELEASE ORAL at 06:09

## 2024-12-20 RX ADMIN — ZINC OXIDE 1 APPLICATION: 200 OINTMENT TOPICAL at 11:25

## 2024-12-20 RX ADMIN — LEVOTHYROXINE SODIUM 50 MCG: 0.05 TABLET ORAL at 06:09

## 2024-12-20 RX ADMIN — Medication 10 ML: at 08:53

## 2024-12-20 RX ADMIN — ZINC OXIDE 1 APPLICATION: 200 OINTMENT TOPICAL at 08:53

## 2024-12-20 RX ADMIN — ACETAMINOPHEN 650 MG: 325 TABLET ORAL at 08:55

## 2024-12-20 RX ADMIN — SODIUM CHLORIDE 1000 MG: 900 INJECTION INTRAVENOUS at 21:06

## 2024-12-20 RX ADMIN — ZINC OXIDE 1 APPLICATION: 200 OINTMENT TOPICAL at 17:07

## 2024-12-20 RX ADMIN — QUETIAPINE FUMARATE 25 MG: 25 TABLET ORAL at 20:57

## 2024-12-20 RX ADMIN — DONEPEZIL HYDROCHLORIDE 10 MG: 10 TABLET, FILM COATED ORAL at 20:57

## 2024-12-20 RX ADMIN — ZINC OXIDE 1 APPLICATION: 200 OINTMENT TOPICAL at 20:58

## 2024-12-20 RX ADMIN — CARVEDILOL 3.12 MG: 3.12 TABLET, FILM COATED ORAL at 17:06

## 2024-12-20 NOTE — PLAN OF CARE
Goal Outcome Evaluation:  Plan of Care Reviewed With: patient      Progress: improving     Anticipated Discharge Disposition (SLP): skilled nursing facility     Treatment Assessment (SLP): improved (12/20/24 1225)  Treatment Assessment Comments (SLP): see note (12/20/24 1225)  Plan for Continued Treatment (SLP): continue treatment per plan of care (12/20/24 1225)    Patient seen to address dysphagia.  Patient denied pain.  Patient able to sit on side of bed and self feed on this date.  Patient is alert to self and somewhat confused at times.  Patient presented with lunch tray.  Patient consumed soft solids with chopped meat and thin liquids with no overt signs or symptoms of aspiration noted.  Mild extended oral phase noted due to decreased mastication ability.  Slight oral residue noted after the swallow.  Patient able to clear with a thin liquid wash when cued.  Education presented to patient with expressed understanding stated.  Patient is safe to continue with current diet of soft with chopped meat and thin liquids.  Patient would benefit from continued treatment to advance diet safely.  ST will continue plan of care.    PACO Gutierrez 12/20/2024 14:01 CST

## 2024-12-20 NOTE — PLAN OF CARE
Goal Outcome Evaluation:      Patient was able to performs supine to sit with CGA . Sitting balance with SBA/CGA. Patient actively worked thru LE ex's. Patient reached all directions maintaining balance. Patient stood x4 with Mod assist of 2. The 4th stand patient stood with Min assist. Patient requires Mod assist x 2 to remain standing. Patient has flexed trunk and knees in standing, not able to correct posture. Patient will benefit from strengthening activities.

## 2024-12-20 NOTE — PLAN OF CARE
Goal Outcome Evaluation:  Plan of Care Reviewed With: patient        Progress: no change  Outcome Evaluation: She is more alert, but still confused. MRI done. Wound to butt is improving. She is on RA. Smith in place. BP was low, IV fluids given. PO Potassium was given. Cont IV ABX. Cont to monitor.

## 2024-12-20 NOTE — CASE MANAGEMENT/SOCIAL WORK
Continued Stay Note   [place]     Patient Name: Jennifer Gomes  MRN: [id number]  Today's Date: 12/20/2024    Admit Date: 12/18/2024    Plan: [facility]   Discharge Plan       Row Name 12/20/24 0851       Plan    Plan [facility]    Patient/Family in Agreement with Plan yes    Plan Comments Pt has a bed at [facility].  Pt will need precert but is currently refusing therapy.                   Discharge Codes    No documentation.                       JEFRY Devi

## 2024-12-20 NOTE — PLAN OF CARE
Goal Outcome Evaluation:  Plan of Care Reviewed With: patient           Outcome Evaluation: Pt in room sleeping but woke up with voice stimuli. Pt is less confused. Intake in the last 72hr 33%; PO fluids 210ml. Pt noted a UBW of 150lb. Appetite is low, but pt is eating some of her Magic Cup. SLP visited pt today and recommended a soft texture diet. At home, pt noted eating two meals a day. Denied any N/V, diarrhea, or food allergies. Dietitian encouraged pt to eat well, advised on available snacks, and encouraged pt to follow up with additional questions. Last BM 12/20. New labs: Creat 1.67, Cl 122, BUN 27. Anticipated D/C to SNF. Will monitor.

## 2024-12-20 NOTE — PROGRESS NOTES
Patient Name: Jennifer Gomes  Date of Admission: 12/18/2024  Today's Date: 12/20/24  Length of Stay: 2  Primary Care Physician: Moiz Schwartz DO    Subjective   Chief Complaint: Altered mental status  HPI   Today: Patient lying in bed, awake and alert.  Daughter and  at bedside.   Patient agreeable to [facility] when medically stable.  Patient denies any pain.  Indwelling catheter removed today.  She is complaining only of feeling extremely weak.  She did work minimally with OT this morning.  Condition is stable.      Documented weights    12/18/24 0042 12/18/24 0434   Weight: 74.8 kg (165 lb) 65.5 kg (144 lb 6.4 oz)          Intake/Output Summary (Last 24 hours) at 12/20/2024 1407  Last data filed at 12/20/2024 1100  Gross per 24 hour   Intake 3330 ml   Output 1075 ml   Net 2255 ml       Results for orders placed during the hospital encounter of 11/12/24    Adult Transthoracic Echo Complete W/ Cont if Necessary Per Protocol    Interpretation Summary    Left ventricular ejection fraction appears to be 66 - 70%.    Left ventricular wall thickness is consistent with mild concentric hypertrophy.    Left ventricular diastolic function is consistent with (grade Ia w/high LAP) impaired relaxation.    Left atrial volume is moderately increased.    Mild pulmonary hypertension is present.       Review of Systems   All pertinent negatives and positives are as above. All other systems have been reviewed and are negative unless otherwise stated.     Objective    Temp:  [97.8 °F (36.6 °C)-98.4 °F (36.9 °C)] 98.4 °F (36.9 °C)  Heart Rate:  [70-87] 87  Resp:  [16-18] 16  BP: (115-133)/(35-49) 133/44  Physical Exam  Constitutional:       Appearance: Normal appearance. She is ill-appearing.   HENT:      Head: Normocephalic and atraumatic.      Mouth/Throat:      Mouth: Mucous membranes are moist.      Pharynx: Oropharynx is clear.   Eyes:      Extraocular Movements: Extraocular movements intact.      Conjunctiva/sclera:  Conjunctivae normal.   Cardiovascular:      Rate and Rhythm: Normal rate and regular rhythm.      Pulses: Normal pulses.      Heart sounds: Normal heart sounds.   Pulmonary:      Effort: Pulmonary effort is normal.      Breath sounds: Normal breath sounds.   Abdominal:      General: There is no distension.      Palpations: Abdomen is soft.      Tenderness: There is no abdominal tenderness.   Musculoskeletal:      Cervical back: Normal range of motion and neck supple.      Right lower leg: No edema.      Left lower leg: No edema.      Comments: Generalized weakness   Skin:     General: Skin is warm and dry.      Findings: Bruising and rash (Under right breast) present.      Comments: Bilateral heel protectors in place  Pictures in chart for additional wounds   Neurological:      General: No focal deficit present.      Mental Status: She is oriented to person, place, and time.   Psychiatric:         Attention and Perception: Attention normal.         Mood and Affect: Affect is flat.         Speech: Speech normal.         Behavior: Behavior normal.         Cognition and Memory: Memory is impaired.       Results Review:  I have reviewed the labs, radiology results, and diagnostic studies.    Laboratory Data:   Results from last 7 days   Lab Units 12/20/24  0411 12/19/24  1214 12/19/24  0311 12/18/24  0102   WBC 10*3/mm3 3.93  --  5.01 9.85   HEMOGLOBIN g/dL 7.7* 7.6* 7.5* 10.4*   HEMATOCRIT % 24.4* 23.9* 24.1* 32.6*   PLATELETS 10*3/mm3 118*  --  156 260        Results from last 7 days   Lab Units 12/20/24  0411 12/19/24  1214 12/19/24  0207 12/18/24  1242 12/18/24  0102   SODIUM mmol/L 145 144 142   < > 138   POTASSIUM mmol/L 4.8 3.2* 3.0*   < > 3.4*   CHLORIDE mmol/L 122* 118* 115*   < > 103   CO2 mmol/L 15.0* 15.0* 13.0*   < > 15.0*   BUN mg/dL 27* 35* 40*   < > 53*   CREATININE mg/dL 1.67* 2.05* 2.18*   < > 2.95*   CALCIUM mg/dL 8.3* 8.4* 8.2*   < > 9.4   BILIRUBIN mg/dL  --   --   --   --  0.2   ALK PHOS U/L  --    --   --   --  89   ALT (SGPT) U/L  --   --   --   --  5   AST (SGOT) U/L  --   --   --   --  14   GLUCOSE mg/dL 82 106* 77   < > 119*    < > = values in this interval not displayed.       Culture Data:     Microbiology Results (last 10 days)       Procedure Component Value - Date/Time    Blood Culture - Blood, Arm, Right [[id number]]  (Normal) Collected: 12/18/24 0110    Lab Status: Preliminary result Specimen: Blood from Arm, Right Updated: 12/20/24 0215     Blood Culture No growth at 2 days    COVID PRE-OP / PRE-PROCEDURE SCREENING ORDER (NO ISOLATION) - Swab, Nasal Cavity [[id number]]  (Normal) Collected: 12/18/24 0102    Lab Status: Final result Specimen: Swab from Nasal Cavity Updated: 12/18/24 0132    Narrative:      The following orders were created for panel order COVID PRE-OP / PRE-PROCEDURE SCREENING ORDER (NO ISOLATION) - Swab, Nasal Cavity.  Procedure                               Abnormality         Status                     ---------                               -----------         ------                     COVID-19 and FLU A/B PCR...[[id number]]  Normal              Final result                 Please view results for these tests on the individual orders.    COVID-19 and FLU A/B PCR, 1 HR TAT - Swab, Nasopharynx [[id number]]  (Normal) Collected: 12/18/24 0102    Lab Status: Final result Specimen: Swab from Nasopharynx Updated: 12/18/24 0132     COVID19 Not Detected     Influenza A PCR Not Detected     Influenza B PCR Not Detected    Narrative:      Fact sheet for providers: https://www.fda.gov/media/011777/download    Fact sheet for patients: https://www.fda.gov/media/867668/download    Test performed by PCR.    Urine Culture - Urine, Straight Cath [[id number]]  (Abnormal)  (Susceptibility) Collected: 12/18/24 0102    Lab Status: Final result Specimen: Urine from Straight Cath Updated: 12/20/24 0943     Urine Culture >100,000 CFU/mL Escherichia coli    Narrative:      Colonization of the urinary tract  without infection is common. Treatment is discouraged unless the patient is symptomatic, pregnant, or undergoing an invasive urologic procedure.    Susceptibility        Escherichia coli      CHULA      Amoxicillin + Clavulanate Susceptible      Ampicillin Susceptible      Ampicillin + Sulbactam Susceptible      Cefazolin (Urine) Susceptible      Cefepime Susceptible      Ceftazidime Susceptible      Ceftriaxone Susceptible      Gentamicin Susceptible      Levofloxacin Susceptible      Nitrofurantoin Susceptible      Piperacillin + Tazobactam Susceptible      Trimethoprim + Sulfamethoxazole Susceptible                           Blood Culture - Blood, Arm, Right [[id number]]  (Normal) Collected: 12/18/24 0102    Lab Status: Preliminary result Specimen: Blood from Arm, Right Updated: 12/20/24 0215     Blood Culture No growth at 2 days             Radiology Data:   Imaging Results (Last 7 Days)       Procedure Component Value Units Date/Time    MRI Brain Without Contrast [[id number]] Collected: 12/19/24 1658     Updated: 12/19/24 1704    Narrative:      MRI BRAIN WO CONTRAST- 12/19/2024 3:31 PM     HISTORY: stroke like symptoms; N17.9-Acute kidney failure, unspecified;  N39.0-Urinary tract infection, site not specified; A41.9-Sepsis,  unspecified organism; L89.90-Pressure ulcer of unspecified site,  unspecified stage; B37.2-Candidiasis of skin and nail; R41.82-Altered  mental status, unspecified; R13.10-Dysphagia, unspecified; Z74.09-Other  reduced mobility     TECHNIQUE: Multisequence, multiplanar MRI of the brain without contrast     COMPARISON: None     FINDINGS:     No diffusion signal abnormality. No intra-axial or extra-axial  hemorrhage. No intracranial mass lesion or mass effect. Mild chronic  small vessel ischemic changes. Ventricles are nondilated. Posterior  fossa structures are unremarkable. Pituitary gland and sella are  unremarkable. The major intracranial flow-voids are preserved. Orbital  contents are  unremarkable. Chronic right maxillary sinus disease.  Partial right mastoid effusion. Normal marrow signal in the skull base  and calvarium.       Impression:         1.  No acute intracranial process. In particular, there is no acute  ischemia.  2.  Chronic small vessel ischemic changes.  3.  Chronic paranasal sinus disease.  4.  Partial right mastoid effusion.     This report was signed and finalized on 12/19/2024 5:01 PM by Dr Tenzin Madrigal.       US Renal Bilateral [[id number]] Collected: 12/18/24 1223     Updated: 12/18/24 1316    Addenda:        ADDENDUM: It is noted that the technologist scanned the kidneys outside  of our normal protocol scanning the left kidney first followed by the  right kidney. Therefore, the measurements of the kidneys are reversed.  The left kidney measures 10.0 cm in wizk-oi-smgi length and the right  kidney 8.9 cm in hwdg-mw-enit length. Otherwise no change from the  previous exam.     This report was signed and finalized on 12/18/2024 1:13 PM by Dr. Chi Hinds MD.     Signed: 12/18/24 1313 by Chi iHnds MD    Narrative:      RENAL ULTRASOUND COMPLETE 12/18/2024 10:57 AM     REASON FOR EXAM: prior hydronephrosis, recurrent TOMÁS; N17.9-Acute kidney  failure, unspecified; N39.0-Urinary tract infection, site not specified;  A41.9-Sepsis, unspecified organism; L89.90-Pressure ulcer of unspecified  site, unspecified stage; B37.2-Candidiasis of skin and nail;  R41.82-Altered mental status, unspecified; R13.10-Dysphagia, unspecified        COMPARISON: 11/15/2024     TECHNIQUE: Multiple longitudinal and transverse realtime sonographic  images of the kidneys and urinary bladder are obtained.  Images and  report are stored in PACS per institutional and state regulations.     FINDINGS:     RIGHT KIDNEY: 10.0 cm. Normal in size, shape, contour and position. No  solid or cystic masses. The central echo complex is compact with no  evidence for hydronephrosis. No nephrolithiasis or  abnormal perinephric  fluid collections . No hydroureter.     LEFT KIDNEY: 8.9 cm. Normal in size, shape, contour and position. No  solid or cystic masses. The central echo complex is compact with no  evidence for hydronephrosis. No nephrolithiasis or abnormal perinephric  fluid collections . No hydroureter.     PELVIS: There is some debris within the bladder but with no discrete  mass. The urinary bladder is otherwise unremarkable. There is no  surrounding ascites.       Impression:      1. Debris within the bladder without evidence of discrete bladder mass  or bladder wall thickening.  2. Normal sonogram of the kidneys. No mass or hydronephrosis..           This report was signed and finalized on 12/18/2024 12:25 PM by Dr. Chi Hinds MD.       CT Cervical Spine Without Contrast [[id number]] Collected: 12/18/24 0609     Updated: 12/18/24 0718    Narrative:      EXAMINATION: CT CERVICAL SPINE WO CONTRAST-      12/18/2024 12:01 AM     HISTORY: fall; N17.9-Acute kidney failure, unspecified; N39.0-Urinary  tract infection, site not specified; A41.9-Sepsis, unspecified organism;  L89.90-Pressure ulcer of unspecified site, unspecified stage;  B37.2-Candidiasis of skin and nail; R41.82-Altered mental status,  unspecified     In order to have a CT radiation dose as low as reasonably achievable  Automated Exposure Control was utilized for adjustment of the mA and/or  KV according to patient size.     Total DLP = 331.85 mGy.cm     The CT scan of the cervical spine is performed with delved intravenous  or intrathecal contrast enhancement.     Images are acquired in axial plane and subsequent reconstruction in  coronal and sagittal planes.     Comparison is made with the previous study dated 9/4/2023.     There is no evidence of or compression.     No acute displacement or malalignment.     There is mild retrolisthesis of C5 over C6.     The curvature is maintained.     There is severe arthropathy of the atlantodental  articulation with  complete obliteration of the atlantodental space.     There are chronic degenerative changes in the form of anterior and  posterior osteophytes, uncinate spurs and facet arthropathy. There is a  resultant mild narrowing of the left neuroforamina C2-3 and C3-4,  bilateral neuroforaminal stenosis at C4-5 C5-6 and C6-7. There is mild  spinal stenosis at C5-6.     Limited visualized prevertebral soft tissues are unremarkable. Thyroid  gland is incompletely visualized and not well evaluated.     Limited visualized lung apices are unremarkable.       Impression:      1. No acute fracture or malalignment.  2. Cervical spondylosis. Mild retrolisthesis C5 over C6.  3. Multilevel prominent disc osteophyte complexes, uncinate spurs and  facet arthropathy and resultant neural foraminal and spinal canal  stenoses are detailed above.     The above study was initially reviewed and reported by [facility]. I do not  find any discrepancies.                                            This report was signed and finalized on 12/18/2024 7:14 AM by Dr. Marty Suresh MD.       XR Chest 1 View [[id number]] Collected: 12/18/24 0649     Updated: 12/18/24 0653    Narrative:      EXAMINATION: XR CHEST 1 VW-     12/18/2024 12:35 AM     HISTORY: altered mentation; N17.9-Acute kidney failure, unspecified;  N39.0-Urinary tract infection, site not specified; A41.9-Sepsis,  unspecified organism; L89.90-Pressure ulcer of unspecified site,  unspecified stage; B37.2-Candidiasis of skin and nail; R41.82-Altered  mental status, unspecified     A frontal projection of the chest is compared with the previous study  dated 10/5/2024.     The lungs are moderately expanded, significantly improved since the  previous study.     The right lung apex is obscured by the bony and soft tissue structures  of the mandible.     There is no evidence of recent infiltrate, pleural effusion, pulmonary  congestion or pneumothorax.     Heart size is in the  normal range. Atheromatous changes of the tortuous  thoracic aorta is noted.     There is deformity of the right distal clavicle representing previous  healed fracture. There is no acute bony abnormality.       Impression:      1. No active cardiopulmonary disease.        This report was signed and finalized on 12/18/2024 6:50 AM by Dr. Marty Suresh MD.       CT Head Without Contrast [[id number]] Collected: 12/18/24 0603     Updated: 12/18/24 0611    Narrative:      EXAMINATION: CT HEAD WO CONTRAST-      12/18/2024 12:01 AM     HISTORY: altered mentation/fall; N17.9-Acute kidney failure,  unspecified; N39.0-Urinary tract infection, site not specified;  A41.9-Sepsis, unspecified organism; L89.90-Pressure ulcer of unspecified  site, unspecified stage; B37.2-Candidiasis of skin and nail;  R41.82-Altered mental status, unspecified     In order to have a CT radiation dose as low as reasonably achievable  Automated Exposure Control was utilized for adjustment of the mA and/or  KV according to patient size.     Total DLP = 852.88 mGy.cm     The CT scan of the head is performed without intravenous contrast  enhancement.     The images are acquired in axial plane and subsequent reconstruction in  coronal and sagittal planes.     Comparison is made with the previous study dated 10/5/2024.     There are significant motion artifacts which lowers the diagnostic  quality of the study. A subtle lesion may not be evaluated or excluded.     There is no evidence of a mass. There is no midline shift.     There is no evidence of intracranial hemorrhage or hematoma.     There is moderate dilatation of the ventricles, basal cisterns and the  cortical sulci suggestive chronic volume loss, similar to the previous  study.     Scattered areas of chronic white matter ischemia bilaterally noted. The  gray-white matter differentiation is maintained.     Very limited visualized posterior fossa structures are grossly  unremarkable.      Images reviewed in bone window show no acute displaced or depressed  skull fracture. A subtle nondisplaced fracture may be obscured due to  significant motion artifact. There is opacification of the right  maxillary antrum similar to the previous study representing chronic  inflammatory process. Mastoid air cells are clear.       Impression:      1. Significant motion artifacts may obscure a subtle intracranial  abnormality or lesion. No obvious/visible acute intracranial  abnormality. If clinically warranted further evaluation with repeat CT  scan of the head or MR imaging of the brain when the patient is able to  cooperate.  2. Chronic ischemic and atrophic changes.     The above study was initially reviewed and reported by [facility]. I do not  find any discrepancies.                                            This report was signed and finalized on 12/18/2024 6:08 AM by Dr. Marty Suresh MD.                I have reviewed the patient's current medications.     atorvastatin, 40 mg, Oral, Daily  carvedilol, 3.125 mg, Oral, BID With Meals  cefTRIAXone, 1,000 mg, Intravenous, Q24H  donepezil, 10 mg, Oral, Nightly  [Held by provider] enoxaparin, 30 mg, Subcutaneous, Daily  hydrocortisone-bacitracin-zinc oxide-nystatin, 1 Application, Topical, 4x Daily  levothyroxine, 50 mcg, Oral, Q AM  pantoprazole, 40 mg, Oral, Q AM  QUEtiapine, 25 mg, Oral, Nightly  sertraline, 25 mg, Oral, Daily  sodium chloride, 10 mL, Intravenous, Q12H            Assessment/Plan   Assessment  Active Hospital Problems    Diagnosis     **Metabolic encephalopathy     Acute cystitis     Hypokalemia     Essential hypertension     Acute renal failure superimposed on stage 3 chronic kidney disease        Treatment Plan  1.  Metabolic encephalopathy, resolved, fall precautions, PT/OT consults  2.  Acute cystitis, urine cultures pending, continue Rocephin day 3/10:03 PM dose  3. Essential hypertension, resume carvedilol blood pressure stable.,  continuous cardiac monitoring, vital signs every 4 hours  4.  Acute renal failure superimposed on stage III CKD, normal saline with KCl 20 mEq/L @100/hr, labs in a.m.  5.  Hypokalemia, resolved, labs in a.m.    Medical Decision Making  4 problems acute, high complexity, unchanged  1 problem chronic, moderate complexity, worsening/now hypotensive      Number and Complexity of problems: 5  Differential Diagnosis: None    Conditions and Status        Condition is unchanged.     MDM Data  External documents reviewed: None  Cardiac tracing (EKG, telemetry) interpretation: Telemetry normal sinus rhythm 60-79  Radiology interpretation: Reviewed  Labs reviewed: Yes  Any tests that were considered but not ordered: None     Decision rules/scores evaluated (example [id number], [place], etc): None     Discussed with: Patient, daughter, and Dr. Espitia     Care Planning  Shared decision making: Patient, daughter, and Dr. Espitia  Code status and discussions: Full  Surrogate Decision Maker: Ivonne Liang, daughter    Disposition  Social Determinants of Health that impact treatment or disposition: [facility] at discharge  I expect the patient to be discharged to [facility] in 1 to 2  days.     Electronically signed by APRIL Cody, 12/20/24, 14:07 CST.

## 2024-12-20 NOTE — PLAN OF CARE
Goal Outcome Evaluation:      Alert and oriented with intermittent confusion. Patient c/o pain from wound to buttocks at beginning of shift. Slept most of the night. Turned q2h. . Room air. Normal sinus rhythm per tele.

## 2024-12-20 NOTE — CASE MANAGEMENT/SOCIAL WORK
Continued Stay Note  [facility]     Patient Name: Jennifer Gomes  MRN: [id number]  Today's Date: 12/20/2024    Admit Date: 12/18/2024    Plan: [facility]   Discharge Plan       Row Name 12/20/24 1031       Plan    Plan [facility]    Patient/Family in Agreement with Plan yes    Plan Comments Marnie Manning with [facility]PS met with pt in room.  She states at this time, [facility] is not clearing her to dc home with spouse/daughter.  SW informed Marnie that a referral had been made to [facility] and they have accepted.  However, until pt works with therapy, she will not be approved through insurance for SNF.  Marnie can be reached at [phone], or email [email or web address].      Row Name 12/20/24 0851       Plan    Plan [facility]    Patient/Family in Agreement with Plan yes    Plan Comments Pt has a bed at [facility].  Pt will need precert but is currently refusing therapy.                   Discharge Codes    No documentation.                       SARAH DeviW

## 2024-12-20 NOTE — THERAPY TREATMENT NOTE
Acute Care - Physical Therapy Treatment Note  [facility]     Patient Name: Jennifer Gomes  : 1940  MRN: [id number]  Today's Date: 2024      Visit Dx:     ICD-10-CM ICD-9-CM   1. TOMÁS (acute kidney injury)  N17.9 584.9   2. Acute UTI  N39.0 599.0   3. Sepsis, due to unspecified organism, unspecified whether acute organ dysfunction present  A41.9 038.9     995.91   4. Pressure injury of skin, unspecified injury stage, unspecified location  L89.90 707.00     707.20   5. Candidal intertrigo  B37.2 112.3   6. Altered mental status, unspecified altered mental status type  R41.82 780.97   7. Dysphagia, unspecified type [R13.10]  R13.10 787.20   8. Impaired mobility [Z74.09]  Z74.09 799.89     Patient Active Problem List   Diagnosis    Gastroesophageal reflux disease    Spinal stenosis, lumbar region, without neurogenic claudication    Erosion of vaginal mesh    Adenocarcinoma of endometrium    S/P hysterectomy with oophorectomy    Encounter for follow-up surveillance of endometrial cancer    History of radiation therapy    Non-traumatic rhabdomyolysis    Metabolic encephalopathy    Acute renal failure superimposed on stage 3 chronic kidney disease    Medical non-compliance, does not take narcotics as prescribed    Chronic constipation    Chronic pain syndrome    Chronic prescription opiate use    Anemia    Hypothyroidism (acquired)    Essential hypertension    Venous insufficiency of both lower extremities    Chronic intractable headache    RAFAL (obstructive sleep apnea)    Acute encephalopathy due to UTI    Toxic metabolic encephalopathy    Polypharmacy    Frequent falls    Grade III internal hemorrhoids    External hemorrhoids    Colitis    Moderate malnutrition    Transaminitis    Acute UTI (urinary tract infection)    Polypharmacy    UTI (urinary tract infection)    Dementia    Hypokalemia    Sepsis due to urinary tract infection    Cardiopulmonary arrest    E coli bacteremia    Anemia requiring  transfusions    Bilateral hydronephrosis    Acute urinary retention    TOMÁS (acute kidney injury)    Acute cystitis    Hypercalcemia    Paroxysmal atrial fibrillation     Past Medical History:   Diagnosis Date    Anxiety     Arthritis     Bronchitis     Cancer     uterine    Chronic pain     Depression     Disease of thyroid gland     Fibromyalgia     GERD (gastroesophageal reflux disease)     Headache     History of transfusion     AS     Hyperlipidemia     Hypertension     Incontinence     Insomnia     Leg pain     Lumbar stenosis     Migraines     Peptic ulcer     Restless legs     Sleep apnea     NO C-PAP    UTI (urinary tract infection)     Vaginal bleeding      Past Surgical History:   Procedure Laterality Date    BLADDER REPAIR      MESH HAD TO BE REMOVED IN 2013    BREAST BIOPSY Right 2017    benign    BREAST CYST EXCISION Left 1982    CARDIAC CATHETERIZATION      CARPAL TUNNEL RELEASE      CATARACT EXTRACTION W/ INTRAOCULAR LENS  IMPLANT, BILATERAL      CHOLECYSTECTOMY WITH INTRAOPERATIVE CHOLANGIOGRAM N/A 2023    Procedure: CHOLECYSTECTOMY LAPAROSCOPIC INTRAOPERATIVE CHOLANGIOGRAM;  Surgeon: Gerardo Arciniega MD;  Location:  [facility] OR;  Service: General;  Laterality: N/A;    COLONOSCOPY      COLONOSCOPY N/A 10/01/2021    Procedure: COLONOSCOPY WITH ANESTHESIA;  Surgeon: Tom Velasco DO;  Location:  [facility] ENDOSCOPY;  Service: Gastroenterology;  Laterality: N/A;  pre: change in bowel habits  post: diverticulosis. hemorrhoids.   Olivia Mora APRN        CYSTECTOMY      D & C HYSTEROSCOPY N/A 2017    Procedure: DILATATION AND CURETTAGE HYSTEROSCOPY;  Surgeon: Shasta Madrigal MD;  Location: [facility] OR;  Service:     DILATION AND CURETTAGE, DIAGNOSTIC / THERAPEUTIC      ENDOSCOPY  2010    Short segment of Arriola's,Moderate chroninc esophagogastritis and negative H.pylori    ENDOSCOPY N/A 2017    Procedure: ESOPHAGOGASTRODUODENOSCOPY WITH ANESTHESIA;  Surgeon:  Tom Velasco, ;  Location: [facility] ENDOSCOPY;  Service:     EYE SURGERY      RETINA    HEMORRHOIDECTOMY SIGMOIDOSCOPY N/A 3/21/2023    Procedure: HEMORRHOIDECTOMY WITH EXAM UNDER ANESTHESIA;  Surgeon: Holly Chavez MD;  Location: [facility] OR;  Service: General;  Laterality: N/A;    HYSTERECTOMY  12/20/2017    ORIF TIBIA/FIBULA FRACTURES Left 2000    TRANSVAGINAL TAPING SUSPENSION N/A 11/06/2017    Procedure: VAGINAL MESH REVISION;  Surgeon: Shasta Madrigal MD;  Location:  [facility] OR;  Service:     VAGINAL MESH REVISION  2013     PT Assessment (Last 12 Hours)       PT Evaluation and Treatment       Row Name 12/20/24 1028          Physical Therapy Time and Intention    Subjective Information complains of;weakness;fatigue  -     Document Type therapy note (daily note)  -     Mode of Treatment physical therapy  -[facility] Name 12/20/24 1028          General Information    Existing Precautions/Restrictions fall  -[facility] Name 12/20/24 1028          Pain    Pretreatment Pain Rating 0/10 - no pain  -     Posttreatment Pain Rating 0/10 - no pain  -[facility] Name 12/20/24 1028          Bed Mobility    Supine-Sit Alpha (Bed Mobility) verbal cues;contact guard  -     Sit-Supine Alpha (Bed Mobility) contact guard  -[facility] Name 12/20/24 1028          Transfers    Comment, (Transfers) stood x4. Improved w each stand.  -       Row Name 12/20/24 1028          Sit-Stand Transfer    Sit-Stand Alpha (Transfers) moderate assist (50% patient effort);minimum assist (75% patient effort);2 person assist  -     Comment, (Sit-Stand Transfer) Flexed posture in standing, not able to correct posture. Mod assist x2 to erermain standing while cleaning pt. & changing sheets.  -       Row Name 12/20/24 1028          Stand-Sit Transfer    Stand-Sit Alpha (Transfers) minimum assist (75% patient effort)  -[facility] Name 12/20/24 1028          Balance    Static Sitting Balance standby  assist  -     Position, Sitting Balance unsupported;sitting edge of bed  -     Sit to Stand Dynamic Balance moderate assist;2-person assist  -     Balance Interventions sitting;dynamic reaching  -     Comment, Balance attempted trunk stabs, not able  -       Row Name 12/20/24 1028          Hip (Therapeutic Exercise)    Hip (Therapeutic Exercise) AROM (active range of motion)  -     Hip AROM (Therapeutic Exercise) bilateral;flexion;aBduction;aDduction;sitting;15 repititions  -       Row Name 12/20/24 1028          Knee (Therapeutic Exercise)    Knee (Therapeutic Exercise) AROM (active range of motion)  -     Knee AROM (Therapeutic Exercise) bilateral;LAQ (long arc quad)  -       Row Name 12/20/24 1028          Ankle (Therapeutic Exercise)    Ankle (Therapeutic Exercise) AROM (active range of motion)  -     Ankle AROM (Therapeutic Exercise) bilateral;dorsiflexion;plantarflexion;sitting;20 repititions  -       Row Name             Wound 12/18/24 0054 Bilateral coccyx    Wound - Properties Group Placement Date: 12/18/24  -SM Placement Time: 0054  -SM Side: Bilateral  -SM Location: coccyx  -SM    Retired Wound - Properties Group Placement Date: 12/18/24  -SM Placement Time: 0054  -SM Side: Bilateral  -SM Location: coccyx  -SM    Retired Wound - Properties Group Placement Date: 12/18/24  -SM Placement Time: 0054  -SM Side: Bilateral  -SM Location: coccyx  -SM    Retired Wound - Properties Group Date first assessed: 12/18/24  -SM Time first assessed: 0054  -SM Side: Bilateral  -SM Location: coccyx  -SM      Row Name             Wound 12/18/24 0055 Left anterior greater trochanter    Wound - Properties Group Placement Date: 12/18/24  -SM Placement Time: 0055  -SM Side: Left  -SM Orientation: anterior  -SM Location: greater trochanter  -SM    Retired Wound - Properties Group Placement Date: 12/18/24  -SM Placement Time: 0055  -SM Side: Left  -SM Orientation: anterior  -SM Location: greater trochanter   -SM    Retired Wound - Properties Group Placement Date: 12/18/24  -SM Placement Time: 0055  -SM Side: Left  -SM Orientation: anterior  -SM Location: greater trochanter  -SM    Retired Wound - Properties Group Date first assessed: 12/18/24  -SM Time first assessed: 0055  -SM Side: Left  -SM Location: greater trochanter  -SM      Row Name             Wound 12/18/24 0107 nose    Wound - Properties Group Placement Date: 12/18/24  -SM Placement Time: 0107  -SM Location: nose  -SM    Retired Wound - Properties Group Placement Date: 12/18/24  -SM Placement Time: 0107  -SM Location: nose  -SM    Retired Wound - Properties Group Placement Date: 12/18/24  -SM Placement Time: 0107  -SM Location: nose  -SM    Retired Wound - Properties Group Date first assessed: 12/18/24  -SM Time first assessed: 0107  -SM Location: nose  -SM      Row Name             Wound 12/18/24 0151 Left breast    Wound - Properties Group Placement Date: 12/18/24  -SM Placement Time: 0151  -SM Side: Left  -SM Orientation: --  -SM Location: breast  -SM    Retired Wound - Properties Group Placement Date: 12/18/24  -SM Placement Time: 0151  -SM Side: Left  -SM Orientation: --  -SM Location: breast  -SM    Retired Wound - Properties Group Placement Date: 12/18/24  -SM Placement Time: 0151  -SM Side: Left  -SM Orientation: --  -SM Location: breast  -SM    Retired Wound - Properties Group Date first assessed: 12/18/24  -SM Time first assessed: 0151  -SM Side: Left  -SM Location: breast  -SM              User Key  (r) = Recorded By, (t) = Taken By, (c) = Cosigned By      Initials Name Provider Type    Daniella Carter PTA Physical Therapist Assistant    Lupe Love RN Registered Nurse                    Physical Therapy Education       Title: PT OT SLP Therapies (In Progress)       Topic: Physical Therapy (In Progress)       Point: Mobility training (Done)       Learning Progress Summary            Patient Acceptance, E, DU by CAITLYN at 12/20/2024 6931     Comment: bed mobility.    Nonacceptance, E, NR,NL,RT by MATTY at 12/19/2024 0969    Comment: benefits of activity, progression of PT                      Point: Home exercise program (Not Started)       Learner Progress:  Not documented in this visit.              Point: Body mechanics (Not Started)       Learner Progress:  Not documented in this visit.              Point: Precautions (Not Started)       Learner Progress:  Not documented in this visit.                              User Key       Initials Effective Dates Name Provider Type Discipline    MATTY 02/03/23 -  Arturo Walters, PT DPT Physical Therapist PT    CAITLYN 02/03/23 -  Daniella Samuels PTA Physical Therapist Assistant PT                  PT Recommendation and Plan         Outcome Measures       Row Name 12/20/24 1100             How much help from another person do you currently need...    Turning from your back to your side while in flat bed without using bedrails? 3  -CAITLYN      Moving from lying on back to sitting on the side of a flat bed without bedrails? 3  -CAITLYN      Moving to and from a bed to a chair (including a wheelchair)? 1  -CAITLYN      Standing up from a chair using your arms (e.g., wheelchair, bedside chair)? 2  -CAITLYN      Climbing 3-5 steps with a railing? 1  -CAITLYN      To walk in hospital room? 1  -CAITLYN      AM-PAC 6 Clicks Score (PT) 11  -CAITLYN                User Key  (r) = Recorded By, (t) = Taken By, (c) = Cosigned By      Initials Name Provider Type    CAITLYN Daniella Samuels, CHANDLER Physical Therapist Assistant                     Time Calculation:    PT Charges       Row Name 12/20/24 1143             Time Calculation    Start Time 1028  -CAITLYN      Stop Time 1053  -CAITLYN      Time Calculation (min) 25 min  -CAITLYN         Timed Charges    98100 - PT Therapeutic Activity Minutes 25  -CAITLYN         Total Minutes    Timed Charges Total Minutes 25  -CAITLYN       Total Minutes 25  -CAITLYN                User Key  (r) = Recorded By, (t) = Taken By, (c) = Cosigned By       Initials Name Provider Type    CAITLYN Daniella Samuels PTA Physical Therapist Assistant                  Therapy Charges for Today       Code Description Service Date Service Provider Modifiers Qty    97686923771 HC PT THERAPEUTIC ACT EA 15 MIN 12/20/2024 Daniella Samuels PTA GP 2            PT G-Codes  Outcome Measure Options: AM-PAC 6 Clicks Basic Mobility (PT)  AM-PAC 6 Clicks Score (PT): 11  AM-PAC 6 Clicks Score (OT): 14    Daniella Samuels PTA  12/20/2024

## 2024-12-20 NOTE — THERAPY TREATMENT NOTE
Acute Care - Speech Language Pathology   Swallow Treatment Note [facility]     Patient Name: Jennifer Gomes  : 1940  MRN: [id number]  Today's Date: 2024               Admit Date: 2024      Patient seen to address dysphagia.  Patient denied pain.  Patient able to sit on side of bed and self feed on this date.  Patient is alert to self and somewhat confused at times.  Patient presented with lunch tray.  Patient consumed soft solids with chopped meat and thin liquids with no overt signs or symptoms of aspiration noted.  Mild extended oral phase noted due to decreased mastication ability.  Slight oral residue noted after the swallow.  Patient able to clear with a thin liquid wash when cued.  Education presented to patient with expressed understanding stated.  Patient is safe to continue with current diet of soft with chopped meat and thin liquids.  Patient would benefit from continued treatment to advance diet safely.  ST will continue plan of care.    Carolyn Quispe SLP 2024 14:02 CST      Visit Dx:     ICD-10-CM ICD-9-CM   1. TOMÁS (acute kidney injury)  N17.9 584.9   2. Acute UTI  N39.0 599.0   3. Sepsis, due to unspecified organism, unspecified whether acute organ dysfunction present  A41.9 038.9     995.91   4. Pressure injury of skin, unspecified injury stage, unspecified location  L89.90 707.00     707.20   5. Candidal intertrigo  B37.2 112.3   6. Altered mental status, unspecified altered mental status type  R41.82 780.97   7. Dysphagia, unspecified type [R13.10]  R13.10 787.20   8. Impaired mobility [Z74.09]  Z74.09 799.89     Patient Active Problem List   Diagnosis    Gastroesophageal reflux disease    Spinal stenosis, lumbar region, without neurogenic claudication    Erosion of vaginal mesh    Adenocarcinoma of endometrium    S/P hysterectomy with oophorectomy    Encounter for follow-up surveillance of endometrial cancer    History of radiation therapy    Non-traumatic rhabdomyolysis     Metabolic encephalopathy    Acute renal failure superimposed on stage 3 chronic kidney disease    Medical non-compliance, does not take narcotics as prescribed    Chronic constipation    Chronic pain syndrome    Chronic prescription opiate use    Anemia    Hypothyroidism (acquired)    Essential hypertension    Venous insufficiency of both lower extremities    Chronic intractable headache    RAFAL (obstructive sleep apnea)    Acute encephalopathy due to UTI    Toxic metabolic encephalopathy    Polypharmacy    Frequent falls    Grade III internal hemorrhoids    External hemorrhoids    Colitis    Moderate malnutrition    Transaminitis    Acute UTI (urinary tract infection)    Polypharmacy    UTI (urinary tract infection)    Dementia    Hypokalemia    Sepsis due to urinary tract infection    Cardiopulmonary arrest    E coli bacteremia    Anemia requiring transfusions    Bilateral hydronephrosis    Acute urinary retention    TOMÁS (acute kidney injury)    Acute cystitis    Hypercalcemia    Paroxysmal atrial fibrillation     Past Medical History:   Diagnosis Date    Anxiety     Arthritis     Bronchitis     Cancer     uterine    Chronic pain     Depression     Disease of thyroid gland     Fibromyalgia     GERD (gastroesophageal reflux disease)     Headache     History of transfusion     AS     Hyperlipidemia     Hypertension     Incontinence     Insomnia     Leg pain     Lumbar stenosis     Migraines     Peptic ulcer     Restless legs     Sleep apnea     NO C-PAP    UTI (urinary tract infection)     Vaginal bleeding      Past Surgical History:   Procedure Laterality Date    BLADDER REPAIR      MESH HAD TO BE REMOVED IN 2013    BREAST BIOPSY Right 2017    benign    BREAST CYST EXCISION Left     CARDIAC CATHETERIZATION      CARPAL TUNNEL RELEASE      CATARACT EXTRACTION W/ INTRAOCULAR LENS  IMPLANT, BILATERAL      CHOLECYSTECTOMY WITH INTRAOPERATIVE CHOLANGIOGRAM N/A 2023    Procedure: CHOLECYSTECTOMY  LAPAROSCOPIC INTRAOPERATIVE CHOLANGIOGRAM;  Surgeon: Gerardo Arciniega MD;  Location: [facility] OR;  Service: General;  Laterality: N/A;    COLONOSCOPY      COLONOSCOPY N/A 10/01/2021    Procedure: COLONOSCOPY WITH ANESTHESIA;  Surgeon: Tom Velasco DO;  Location: [facility] ENDOSCOPY;  Service: Gastroenterology;  Laterality: N/A;  pre: change in bowel habits  post: diverticulosis. hemorrhoids.   Olivia Mora, APRN        CYSTECTOMY      D & C HYSTEROSCOPY N/A 11/06/2017    Procedure: DILATATION AND CURETTAGE HYSTEROSCOPY;  Surgeon: Shasta Madrigal MD;  Location: [facility] OR;  Service:     DILATION AND CURETTAGE, DIAGNOSTIC / THERAPEUTIC  2008    ENDOSCOPY  09/23/2010    Short segment of Arriola's,Moderate chroninc esophagogastritis and negative H.pylori    ENDOSCOPY N/A 09/25/2017    Procedure: ESOPHAGOGASTRODUODENOSCOPY WITH ANESTHESIA;  Surgeon: Tom Velasco DO;  Location: [facility] ENDOSCOPY;  Service:     EYE SURGERY      RETINA    HEMORRHOIDECTOMY SIGMOIDOSCOPY N/A 3/21/2023    Procedure: HEMORRHOIDECTOMY WITH EXAM UNDER ANESTHESIA;  Surgeon: Holly Chavez MD;  Location: [facility] OR;  Service: General;  Laterality: N/A;    HYSTERECTOMY  12/20/2017    ORIF TIBIA/FIBULA FRACTURES Left 2000    TRANSVAGINAL TAPING SUSPENSION N/A 11/06/2017    Procedure: VAGINAL MESH REVISION;  Surgeon: Shasta Madrigal MD;  Location: [facility] OR;  Service:     VAGINAL MESH REVISION  2013       SLP Recommendation and Plan     SLP Diet Recommendation: soft to chew textures, chopped, thin liquids (12/20/24 1225)  Recommended Precautions and Strategies: upright posture during/after eating, small bites of food and sips of liquid, alternate between small bites of food and sips of liquid, general aspiration precautions, assist with feeding (12/20/24 1225)  SLP Rec. for Method of Medication Administration: meds crushed, with puree (12/20/24 1225)     Monitor for Signs of Aspiration: yes, cough, gurgly voice, throat clearing,  pneumonia, notify SLP if any concerns (12/20/24 1225)        Anticipated Discharge Disposition (SLP): skilled nursing facility (12/20/24 1225)     Therapy Frequency (Swallow): PRN (12/20/24 1225)  Predicted Duration Therapy Intervention (Days): until discharge (12/20/24 1225)  Oral Care Recommendations: Oral Care before breakfast, after meals and PRN (12/20/24 1225)        Daily Summary of Progress (SLP): progress toward functional goals as expected (12/20/24 1225)               Treatment Assessment (SLP): improved (12/20/24 1225)  Treatment Assessment Comments (SLP): see note (12/20/24 1225)  Plan for Continued Treatment (SLP): continue treatment per plan of care (12/20/24 1225)         Progress: improving      SWALLOW EVALUATION (Last 72 Hours)       SLP Adult Swallow Evaluation       Row Name 12/20/24 1225 12/19/24 1325 12/18/24 0825             Rehab Evaluation    Document Type therapy note (daily note)  -MD therapy note (daily note)  -MD evaluation  -MB      Subjective Information no complaints  -MD no complaints  -MD fatigue  -MB      Patient Observations alert;cooperative;agree to therapy  -MD lethargic  -MD lethargic  -MB      Patient/Family/Caregiver Comments/Observations No family present  -MD -- PCA present  -MB      Patient Effort good  -MD good  -MD --         General Information    Patient Profile Reviewed -- -- yes  -MB      Pertinent History Of Current Problem -- -- AMS, metabolic encephalopathy, TOMÁS, HTN, HLD, RAFAL, fall.  -MB      Current Method of Nutrition -- -- regular textures;thin liquids  -MB      Precautions/Limitations, Vision -- -- difficult to assess  -MB      Precautions/Limitations, Hearing -- -- hearing impairment, bilaterally  -MB      Prior Level of Function-Communication -- -- unknown  -MB      Prior Level of Function-Swallowing -- -- no diet consistency restrictions  -MB      Plans/Goals Discussed with -- -- patient  -MB      Barriers to Rehab -- -- cognitive status  -MB       Patient's Goals for Discharge -- -- patient did not state  -MB         Pain    Pretreatment Pain Rating 0/10 - no pain  -MD -- --      Posttreatment Pain Rating 0/10 - no pain  -MD -- --      Additional Documentation -- -- Pain Scale: FACES Pre/Post-Treatment (Group)  -MB         Pain Scale: FACES Pre/Post-Treatment    Pain: FACES Scale, Pretreatment -- 0-->no hurt  -MD 0-->no hurt  -MB      Posttreatment Pain Rating -- 0-->no hurt  -MD --         Oral Motor Structure and Function    Dentition Assessment -- -- natural, present and adequate  -MB      Secretion Management -- -- WNL/WFL  -MB      Mucosal Quality -- -- dry  -MB         Oral Musculature and Cranial Nerve Assessment    Oral Motor General Assessment -- -- unable to assess  -MB         General Eating/Swallowing Observations    Eating/Swallowing Skills -- -- fed by SLP  -MB      Positioning During Eating -- -- upright in bed  -MB      Utensils Used -- -- straw  -MB      Consistencies Trialed -- -- thin liquids  -MB         Clinical Swallow Eval    Oral Prep Phase -- -- impaired  -MB      Oral Transit -- -- WFL  -MB      Oral Residue -- -- WFL  -MB      Pharyngeal Phase -- -- no overt signs/symptoms of pharyngeal impairment  -MB      Esophageal Phase -- -- unremarkable  -MB      Clinical Swallow Evaluation Summary -- -- See note  -MB         Oral Prep Concerns    Oral Prep Concerns -- -- incomplete or weak lip closure around spoon  -MB      Incomplete or Weak Lip Closure Around Spoon -- -- thin  -MB         SLP Evaluation Clinical Impression    SLP Swallowing Diagnosis -- -- moderate;oral dysphagia;R/O pharyngeal dysphagia  -MB      Functional Impact -- -- risk of aspiration/pneumonia;risk of malnutrition;risk of dehydration  -MB      Rehab Potential/Prognosis, Swallowing -- -- adequate, monitor progress closely  -MB      Swallow Criteria for Skilled Therapeutic Interventions Met -- -- demonstrates skilled criteria  -MB         SLP Treatment Clinical  Impressions    Treatment Assessment (SLP) improved  -MD improved  -MD --      Treatment Assessment Comments (SLP) see note  -MD see note  -MD --      Daily Summary of Progress (SLP) progress toward functional goals as expected  -MD progress toward functional goals is good  -MD --      Barriers to Overall Progress (SLP) -- Lethargy  -MD --      Plan for Continued Treatment (SLP) continue treatment per plan of care  -MD continue treatment per plan of care  -MD --      Care Plan Review current/potential barriers reviewed;patient/other agree to care plan  -MD risks/benefits reviewed;current/potential barriers reviewed;patient/other agree to care plan  -MD --      Care Plan Review, Other Participant(s) caregiver  -MD caregiver  -MD --         Recommendations    Therapy Frequency (Swallow) PRN  -MD PRN  -MD PRN  -MB      Predicted Duration Therapy Intervention (Days) until discharge  -MD until discharge  -MD until discharge  -MB      SLP Diet Recommendation soft to chew textures;chopped;thin liquids  -MD soft to chew textures;chopped;thin liquids  -MD puree;thin liquids  -MB      Recommended Precautions and Strategies upright posture during/after eating;small bites of food and sips of liquid;alternate between small bites of food and sips of liquid;general aspiration precautions;assist with feeding  -MD upright posture during/after eating;small bites of food and sips of liquid;alternate between small bites of food and sips of liquid;general aspiration precautions;assist with feeding  -MD upright posture during/after eating;small bites of food and sips of liquid;alternate between small bites of food and sips of liquid;general aspiration precautions;assist with feeding  -MB      Oral Care Recommendations Oral Care before breakfast, after meals and PRN  -MD Oral Care before breakfast, after meals and PRN  -MD Oral Care before breakfast, after meals and PRN  -MB      SLP Rec. for Method of Medication Administration meds  crushed;with lela GONZALES meds crushed;with lela GONZALES meds crushed;with lela  -MB      Monitor for Signs of Aspiration yes;cough;gurgly voice;throat clearing;pneumonia;notify SLP if any concerns  -MD yes;cough;gurgly voice;throat clearing;pneumonia;notify SLP if any concerns  -MD yes;cough;gurgly voice;throat clearing;pneumonia;notify SLP if any concerns  -MB      Anticipated Discharge Disposition (SLP) skilled nursing facility  -MD skilled nursing facility  -MD skilled nursing facility  -MB         Swallow Goals (SLP)    Swallow LTGs Swallow Long Term Goal (free text)  -MD Swallow Long Term Goal (free text)  -MD Swallow Long Term Goal (free text)  -MB      Swallow STGs diet tolerance goal selection (SLP)  -MD diet tolerance goal selection (SLP)  -MD diet tolerance goal selection (SLP)  -MB      Diet Tolerance Goal Selection (SLP) Patient will tolerate trials of  -MD Patient will tolerate trials of  -MD Patient will tolerate trials of  -MB         (LTG) Swallow    (LTG) Swallow Pt will tolerate least restrictive diet with no overt s/s of aspiration.  -MD Pt will tolerate least restrictive diet with no overt s/s of aspiration.  -MD Pt will tolerate least restrictive diet with no overt s/s of aspiration.  -MB      [place] (Swallow Long Term Goal) independently (over 90% accuracy)  -MD independently (over 90% accuracy)  -MD independently (over 90% accuracy)  -MB      Time Frame (Swallow Long Term Goal) by discharge  -MD by discharge  -MD by discharge  -MB      Barriers (Swallow Long Term Goal) n/a  -MD n/a  -MD n/a  -MB      Progress/Outcomes (Swallow Long Term Goal) good progress toward goal  -MD good progress toward goal  -MD new goal  -MB      Comment (Swallow Long Term Goal) n/a  -MD n/a  -MD n/a  -MB         (STG) Patient will tolerate trials of    Consistencies Trialed (Tolerate trials) regular textures;soft to chew (whole) textures;pureed textures;thin liquids  -MD regular textures;soft to chew (whole)  textures;pureed textures;thin liquids  -MD regular textures;soft to chew (whole) textures;pureed textures;thin liquids  -MB      Desired Outcome (Tolerate trials) without signs/symptoms of aspiration;with adequate oral prep/transit/clearance  -MD without signs/symptoms of aspiration;with adequate oral prep/transit/clearance  -MD without signs/symptoms of aspiration;with adequate oral prep/transit/clearance  -MB      [place] (Tolerate trials) independently (over 90% accuracy)  -MD independently (over 90% accuracy)  -MD independently (over 90% accuracy)  -MB      Time Frame (Tolerate trials) by discharge  -MD by discharge  -MD by discharge  -MB      Progress/Outcomes (Tolerate trials) good progress toward goal  -MD good progress toward goal  -MD new goal  -MB      Comment (Tolerate trials) n/a  -MD -- n/a  -MB                User Key  (r) = Recorded By, (t) = Taken By, (c) = Cosigned By      Initials Name Effective Dates    Serena Gan, CCC-SLP 02/03/23 -     Carolyn Dacosta, SLP 06/21/22 -                     EDUCATION  The patient has been educated in the following areas:   Dysphagia (Swallowing Impairment).        SLP GOALS       Row Name 12/20/24 1225 12/19/24 1325 12/18/24 0825       (LTG) Swallow    (LTG) Swallow Pt will tolerate least restrictive diet with no overt s/s of aspiration.  -MD Pt will tolerate least restrictive diet with no overt s/s of aspiration.  -MD Pt will tolerate least restrictive diet with no overt s/s of aspiration.  -MB    [place] (Swallow Long Term Goal) independently (over 90% accuracy)  -MD independently (over 90% accuracy)  -MD independently (over 90% accuracy)  -MB    Time Frame (Swallow Long Term Goal) by discharge  -MD by discharge  -MD by discharge  -MB    Barriers (Swallow Long Term Goal) n/a  -MD n/a  -MD n/a  -MB    Progress/Outcomes (Swallow Long Term Goal) good progress toward goal  -MD good progress toward goal  -MD new goal  -MB    Comment (Swallow Long  Term Goal) n/a  -MD n/a  -MD n/a  -MB       (STG) Patient will tolerate trials of    Consistencies Trialed (Tolerate trials) regular textures;soft to chew (whole) textures;pureed textures;thin liquids  -MD regular textures;soft to chew (whole) textures;pureed textures;thin liquids  -MD regular textures;soft to chew (whole) textures;pureed textures;thin liquids  -MB    Desired Outcome (Tolerate trials) without signs/symptoms of aspiration;with adequate oral prep/transit/clearance  -MD without signs/symptoms of aspiration;with adequate oral prep/transit/clearance  -MD without signs/symptoms of aspiration;with adequate oral prep/transit/clearance  -MB    [place] (Tolerate trials) independently (over 90% accuracy)  -MD independently (over 90% accuracy)  -MD independently (over 90% accuracy)  -MB    Time Frame (Tolerate trials) by discharge  -MD by discharge  -MD by discharge  -MB    Progress/Outcomes (Tolerate trials) good progress toward goal  -MD good progress toward goal  -MD new goal  -MB    Comment (Tolerate trials) n/a  -MD -- n/a  -MB              User Key  (r) = Recorded By, (t) = Taken By, (c) = Cosigned By      Initials Name Provider Type    Serena Gan, CCC-SLP Speech and Language Pathologist    Carolyn Dacosta, SLP Speech and Language Pathologist                         Time Calculation:    Time Calculation- SLP       Row Name 12/20/24 1401             Time Calculation- SLP    SLP Start Time 1225  -MD      SLP Stop Time 1248  -MD      SLP Time Calculation (min) 23 min  -MD      SLP Received On 12/20/24  -MD         Untimed Charges    97354-WN Treatment Swallow Minutes 23  -MD         Total Minutes    Untimed Charges Total Minutes 23  -MD       Total Minutes 23  -MD                User Key  (r) = Recorded By, (t) = Taken By, (c) = Cosigned By      Initials Name Provider Type    Carolyn Dacosta, SLP Speech and Language Pathologist                    Therapy Charges for Today       Code  Description Service Date Service Provider Modifiers Qty    [id number] HC ST TREATMENT SWALLOW 3 12/19/2024 Carolyn Quispe, SLP GN 1    [id number] HC ST TREATMENT SWALLOW 2 12/20/2024 Carolyn Quispe, SLP GN 1                 Carolyn Quispe, SLP  12/20/2024

## 2024-12-20 NOTE — PROGRESS NOTES
[facility]    Palliative Care Daily Progress Note   Chief complaint-chart review    Code Status:   Code Status and Medical Interventions: No CPR (Do Not Attempt to Resuscitate); Limited Support; No intubation (DNI), No vasopressors, No dialysis, No artificial nutrition, No blood products, No cardioversion, No NIPPV (Non-Invasive Positive Pressu...   Ordered at: 24 1507     Medical Intervention Limits:    No intubation (DNI)    No vasopressors    No dialysis    No artificial nutrition    No blood products    No cardioversion    No NIPPV (Non-Invasive Positive Pressure Ventilation)     Level Of Support Discussed With:    Next of Kin (If No Surrogate)     Code Status (Patient has no pulse and is not breathing):    No CPR (Do Not Attempt to Resuscitate)     Medical Interventions (Patient has pulse or is breathing):    Limited Support     Comments:    Ivonne pelayo      Advanced Directives: Advance Directive Status: Patient does not have advance directive   Goals of Care: Ongoing.     S: Medical record reviewed. Events noted. MRI  show no intracranial process, no acute ischemia. Chronic small vessel ischemic change. Chronic paranasal sinus disease. Partial right mastoid effusion.      Pain Assessment  CPOT and PAINAD Scales: PAINAD (Pain Assessment in Advance Dementia Scale)  PAINAD Breathin-->normal  PAINAD Negative Vocalization: 0-->none  PAINAD Facial Expression: 0-->smiling or inexpressive  PAINAD Body Language: 0-->relaxed  PAINAD Consolability: 0-->no need to console  PAINAD Score: 0  Pain Location: buttock    O:     Intake/Output Summary (Last 24 hours) at 2024 0814  Last data filed at 2024 0437  Gross per 24 hour   Intake 3210 ml   Output 950 ml   Net 2260 ml       Diagnostics and current medications: Reviewed.      Current Facility-Administered Medications:     acetaminophen (TYLENOL) tablet 650 mg, 650 mg, Oral, Q6H PRN, Asiya  MD Antonino, 650 mg at 12/19/24 1958    atorvastatin (LIPITOR) tablet 40 mg, 40 mg, Oral, Daily, Antonino Braden MD, 40 mg at 12/19/24 0850    [Held by provider] carvedilol (COREG) tablet 3.125 mg, 3.125 mg, Oral, BID With Meals, Antonino Braden MD, 3.125 mg at 12/18/24 1848    cefTRIAXone (ROCEPHIN) 1,000 mg in sodium chloride 0.9 % 100 mL MBP, 1,000 mg, Intravenous, Q24H, Antonino Braden MD, Last Rate: 200 mL/hr at 12/19/24 2237, 1,000 mg at 12/19/24 2237    donepezil (ARICEPT) tablet 10 mg, 10 mg, Oral, Nightly, Antonino Braden MD, 10 mg at 12/19/24 2001    [Held by provider] Enoxaparin Sodium (LOVENOX) syringe 30 mg, 30 mg, Subcutaneous, Daily, Antonino Braden MD, 30 mg at 12/18/24 1456    hydrocortisone-bacitracin-zinc oxide-nystatin (MAGIC BARRIER) ointment 1 Application, 1 Application, Topical, 4x Daily, Morenita Paredes, APRN, 1 Application at 12/19/24 2006    levothyroxine (SYNTHROID, LEVOTHROID) tablet 50 mcg, 50 mcg, Oral, Q AM, Antonino Braden MD, 50 mcg at 12/20/24 0609    nitroglycerin (NITROSTAT) SL tablet 0.4 mg, 0.4 mg, Sublingual, Q5 Min PRN, Antonino Braden MD    pantoprazole (PROTONIX) EC tablet 40 mg, 40 mg, Oral, Q AM, Antonino Braden MD, 40 mg at 12/20/24 0609    QUEtiapine (SEROquel) tablet 25 mg, 25 mg, Oral, Nightly, Antonino Braden MD, 25 mg at 12/19/24 2001    sertraline (ZOLOFT) tablet 25 mg, 25 mg, Oral, Daily, Antonino Braden MD, 25 mg at 12/19/24 0850    [COMPLETED] Insert Peripheral IV, , , Once **AND** sodium chloride 0.9 % flush 10 mL, 10 mL, Intravenous, PRN, Antonino Braden MD    sodium chloride 0.9 % flush 10 mL, 10 mL, Intravenous, Q12H, Antonino Braden MD, 10 mL at 12/19/24 2003    sodium chloride 0.9 % infusion 40 mL, 40 mL, Intravenous, PRN, Antonino Braden MD    Allergies   Allergen Reactions    Ropinirole Hcl Shortness Of Breath    Codeine Itching and Mental Status Change    Definity [Perflutren Lipid Microsphere] Other (See Comments)     Severe  "back pain    Ambien [Zolpidem] Other (See Comments)     HYPER     Eszopiclone Other (See Comments)     MAKES PT HYPER     Pregabalin Dizziness    Tizanidine Other (See Comments)     Terrible nightmares     I have utilized all available immediate resources to obtain, update, or review the patient's current medications (including all prescriptions, over-the-counter products, herbals, cannabis/cannabidiol products, and vitamin/mineral/dietary (nutritional) supplements) for name, route of administration, type, dose and frequency.    A:    BP (!) 128/37 (BP Location: Right arm, Patient Position: Lying)   Pulse 73   Temp 97.8 °F (36.6 °C) (Oral)   Resp 16   Ht 157.5 cm (62\")   Wt 65.5 kg (144 lb 6.4 oz)   LMP  (LMP Unknown)   SpO2 97%   BMI 26.41 kg/m²     Patient status: Disease state: Controlled with current treatments.  Current Functional status: Palliative Performance Scale Score: Performance 30% based on the following measures: Ambulation: Totally bed bound, Activity and Evidence of Disease: Unable to do any work, extensive evidence of disease, Self-Care: Total care required,  Intake: Reduced, LOC: Full, drowsy or confusion   Baseline ECOG Status(4) Completely disabled, unable to carry out self-care.  Totally confined to bed or chair.  Nutritional status: Albumin 3.8 Body mass index is 26.41 kg/m².      Hospital Problem List      Metabolic encephalopathy    Acute renal failure superimposed on stage 3 chronic kidney disease    Essential hypertension    Dementia    Acute cystitis     Impression/Problem List:     Alzheimer's dementia open cerebral microvascular disease and atrophy per CT  Metabolic encephalopathy  Acute urinary tract infection, E. 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 plan: Goals of care include CODE STATUS No CPR/limited support interventions.     Family support: The patient receives support from her children..  Advance Directives: Advance Directive Status: Patient does not have advance directive   POA/Healthcare surrogate-spouse and daughter.     2.  Palliative care encounter  - Prognosis is poor to guarded long-term secondary to Alzheimer's dementia, recent cardiopulmonary arrest event, endometrioid adenocarcinoma, dysphagia, multiple comorbidities, and advanced age.     -Seen by this provider during October/2024 event at which time patient experienced cardiopulmonary arrest with successful resuscitation.  Patient elected to continue full aggressive care interventions including CPR.      -According to chart review patient was minimally responsive and unable to follow commands, nurse charts ER found a bottle of pills in her vagina identified as Sara IR 15 mg. The medication name on the bottle is not the same. Received Narcan.  APS following.    -Urine culture positive for E. coli.  Blood cultures pending.  Started on IV fluids, IV antibiotics.    -Evaluated by speech therapy and started on a modified diet with thin liquid, purée consistency food due to moderate oral dysphagia, one-to-one assistance with meals.    -Noted to have significant weight loss dietitian currently 144#, 175# 10/5/2024.   12/19-Noted to have hypotension this morning requiring fluid bolus, beta-blocker placed on hold.  Plans to receive electrolyte replacement.        12/19-CODE STATUS changes-no CPR/limited support interventions, no intubation, no vasopressors, no dialysis, no artificial nutrition, no  "blood products, no cardioversion, no NIPPV.  \"She would not want that\".   -discussed further de-escalation options with focus of comfort and best supportive care directed by hospice in the event of further decline and no further desire for rehospitalization's and aggressive care interventions.      12/20-continue supportive measures  -Will plan to complete a MOST document prior to discharge and when family available  -Will plan to follow-up with family/patient on Monday,12/23 or sooner if needed.       Thank you for allowing us to participate in patient's plan of care. Palliative Care Team will continue to follow patient.     Joana Rush, APRN  12/20/2024  08:14 CST   "

## 2024-12-21 LAB
ANION GAP SERPL CALCULATED.3IONS-SCNC: 8 MMOL/L (ref 5–15)
BUN SERPL-MCNC: 17 MG/DL (ref 8–23)
BUN/CREAT SERPL: 12.7 (ref 7–25)
CALCIUM SPEC-SCNC: 9 MG/DL (ref 8.6–10.5)
CHLORIDE SERPL-SCNC: 115 MMOL/L (ref 98–107)
CO2 SERPL-SCNC: 18 MMOL/L (ref 22–29)
CREAT SERPL-MCNC: 1.34 MG/DL (ref 0.57–1)
EGFRCR SERPLBLD CKD-EPI 2021: 39.7 ML/MIN/1.73
GLUCOSE SERPL-MCNC: 126 MG/DL (ref 65–99)
HCT VFR BLD AUTO: 27.2 % (ref 34–46.6)
HGB BLD-MCNC: 8.7 G/DL (ref 12–15.9)
POTASSIUM SERPL-SCNC: 3.4 MMOL/L (ref 3.5–5.2)
SODIUM SERPL-SCNC: 141 MMOL/L (ref 136–145)

## 2024-12-21 PROCEDURE — 85018 HEMOGLOBIN: CPT | Performed by: NURSE PRACTITIONER

## 2024-12-21 PROCEDURE — 25810000003 SODIUM CHLORIDE 0.9 % SOLUTION: Performed by: NURSE PRACTITIONER

## 2024-12-21 PROCEDURE — 80048 BASIC METABOLIC PNL TOTAL CA: CPT | Performed by: NURSE PRACTITIONER

## 2024-12-21 PROCEDURE — 85014 HEMATOCRIT: CPT | Performed by: NURSE PRACTITIONER

## 2024-12-21 RX ORDER — POTASSIUM CHLORIDE 750 MG/1
40 CAPSULE, EXTENDED RELEASE ORAL
Status: COMPLETED | OUTPATIENT
Start: 2024-12-21 | End: 2024-12-21

## 2024-12-21 RX ORDER — POTASSIUM CHLORIDE 750 MG/1
20 CAPSULE, EXTENDED RELEASE ORAL DAILY
Status: COMPLETED | OUTPATIENT
Start: 2024-12-22 | End: 2024-12-23

## 2024-12-21 RX ORDER — SODIUM CHLORIDE 9 MG/ML
50 INJECTION, SOLUTION INTRAVENOUS CONTINUOUS
Status: DISCONTINUED | OUTPATIENT
Start: 2024-12-21 | End: 2024-12-23

## 2024-12-21 RX ADMIN — PANTOPRAZOLE SODIUM 40 MG: 40 TABLET, DELAYED RELEASE ORAL at 05:28

## 2024-12-21 RX ADMIN — ZINC OXIDE 1 APPLICATION: 200 OINTMENT TOPICAL at 17:07

## 2024-12-21 RX ADMIN — SODIUM CHLORIDE 50 ML/HR: 9 INJECTION, SOLUTION INTRAVENOUS at 12:16

## 2024-12-21 RX ADMIN — SERTRALINE HYDROCHLORIDE 25 MG: 50 TABLET ORAL at 09:39

## 2024-12-21 RX ADMIN — ZINC OXIDE 1 APPLICATION: 200 OINTMENT TOPICAL at 12:13

## 2024-12-21 RX ADMIN — CARVEDILOL 3.12 MG: 3.12 TABLET, FILM COATED ORAL at 09:39

## 2024-12-21 RX ADMIN — ACETAMINOPHEN 650 MG: 325 TABLET ORAL at 12:13

## 2024-12-21 RX ADMIN — DONEPEZIL HYDROCHLORIDE 10 MG: 10 TABLET, FILM COATED ORAL at 20:00

## 2024-12-21 RX ADMIN — Medication 10 ML: at 09:40

## 2024-12-21 RX ADMIN — ZINC OXIDE 1 APPLICATION: 200 OINTMENT TOPICAL at 20:00

## 2024-12-21 RX ADMIN — POTASSIUM CHLORIDE 40 MEQ: 750 CAPSULE, EXTENDED RELEASE ORAL at 17:07

## 2024-12-21 RX ADMIN — ATORVASTATIN CALCIUM 40 MG: 40 TABLET, FILM COATED ORAL at 09:39

## 2024-12-21 RX ADMIN — POTASSIUM CHLORIDE 40 MEQ: 750 CAPSULE, EXTENDED RELEASE ORAL at 12:12

## 2024-12-21 RX ADMIN — QUETIAPINE FUMARATE 25 MG: 25 TABLET ORAL at 20:00

## 2024-12-21 RX ADMIN — LEVOTHYROXINE SODIUM 50 MCG: 0.05 TABLET ORAL at 05:28

## 2024-12-21 RX ADMIN — ACETAMINOPHEN 650 MG: 325 TABLET ORAL at 20:00

## 2024-12-21 RX ADMIN — ZINC OXIDE 1 APPLICATION: 200 OINTMENT TOPICAL at 09:40

## 2024-12-21 RX ADMIN — CARVEDILOL 3.12 MG: 3.12 TABLET, FILM COATED ORAL at 17:06

## 2024-12-21 NOTE — PLAN OF CARE
Goal Outcome Evaluation:  Plan of Care Reviewed With: patient     Problem: Adult Inpatient Plan of Care  Goal: Plan of Care Review  Outcome: Progressing  Flowsheets (Taken 12/21/2024 5843)  Progress: improving  Outcome Evaluation: Pt alert and answered all my questions, she has requests in between my questions, but answers appropriately. Pt has slept well overnight and has not c/o pain for me even when we helped her turn and cleaned up her incontinence. Safety maintained, call light within reach, care plan ongoing.  Plan of Care Reviewed With: patient

## 2024-12-21 NOTE — CASE MANAGEMENT/SOCIAL WORK
Continued Stay Note   [place]     Patient Name: Jennifer Gomes  MRN: [id number]  Today's Date: 12/21/2024    Admit Date: 12/18/2024    Plan: [facility]   Discharge Plan       Row Name 12/21/24 0956       Plan    Plan [facility]    Patient/Family in Agreement with Plan yes    Plan Comments APS following.  SW informed Rachelle at [facility] that pt is ready for dc and working with therapy.  They will begin precert.  SW will inform when precert is completed (will not be prior to Monday).    Final Discharge Disposition Code 03 - skilled nursing facility (SNF)                   Discharge Codes    No documentation.                       SARAH DeviW

## 2024-12-21 NOTE — PROGRESS NOTES
"Patient Name: Jennifer Gomes  Date of Admission: 12/18/2024  Today's Date: 12/21/24  Length of Stay: 3  Primary Care Physician: Moiz Schwartz DO    Subjective   Chief Complaint: Altered mental status  HPI   Today: Patient lying in bed, awake and alert.  Daughter and  at bedside.   Patient agreeable to [facility] when medically stable.  Patient denies any pain.  Indwelling catheter removed today.  She is complaining only of feeling extremely weak.  She did work minimally with OT this morning.  At time of assessment I had to awaken patient.  She is complaining of ongoing profound weakness.  She again declines PRBC transfusion.  I did raise the possibility of discharge on Monday, she states, \"somebody is investigating something on Monday so I cannot be discharged then\".  I encouraged her to continue working with PT/OT.  She said she would try.  Condition is stable.      Documented weights    12/18/24 0042 12/18/24 0434   Weight: 74.8 kg (165 lb) 65.5 kg (144 lb 6.4 oz)          Intake/Output Summary (Last 24 hours) at 12/21/2024 1118  Last data filed at 12/20/2024 1843  Gross per 24 hour   Intake 120 ml   Output --   Net 120 ml       Results for orders placed during the hospital encounter of 11/12/24    Adult Transthoracic Echo Complete W/ Cont if Necessary Per Protocol    Interpretation Summary    Left ventricular ejection fraction appears to be 66 - 70%.    Left ventricular wall thickness is consistent with mild concentric hypertrophy.    Left ventricular diastolic function is consistent with (grade Ia w/high LAP) impaired relaxation.    Left atrial volume is moderately increased.    Mild pulmonary hypertension is present.       Review of Systems   All pertinent negatives and positives are as above. All other systems have been reviewed and are negative unless otherwise stated.     Objective    Temp:  [97.7 °F (36.5 °C)-98.4 °F (36.9 °C)] 98.1 °F (36.7 °C)  Heart Rate:  [70-87] 70  Resp:  [14-16] 16  BP: " (133-141)/(43-52) 141/46  Physical Exam  Constitutional:       Appearance: Normal appearance. She is ill-appearing.   HENT:      Head: Normocephalic and atraumatic.      Mouth/Throat:      Mouth: Mucous membranes are moist.      Pharynx: Oropharynx is clear.   Eyes:      Extraocular Movements: Extraocular movements intact.      Conjunctiva/sclera: Conjunctivae normal.   Cardiovascular:      Rate and Rhythm: Normal rate and regular rhythm.      Pulses: Normal pulses.      Heart sounds: Normal heart sounds.   Pulmonary:      Effort: Pulmonary effort is normal.      Breath sounds: Normal breath sounds.   Abdominal:      General: There is no distension.      Palpations: Abdomen is soft.      Tenderness: There is no abdominal tenderness.   Musculoskeletal:      Cervical back: Normal range of motion and neck supple.      Right lower leg: No edema.      Left lower leg: No edema.      Comments: Generalized weakness   Skin:     General: Skin is warm and dry.      Findings: Bruising and rash (Under right breast) present.      Comments: Bilateral heel protectors in place  Pictures in chart for additional wounds   Neurological:      General: No focal deficit present.      Mental Status: She is oriented to person, place, and time.   Psychiatric:         Attention and Perception: Attention normal.         Mood and Affect: Affect is flat.         Speech: Speech normal.         Behavior: Behavior normal.         Cognition and Memory: Memory is impaired.       Results Review:  I have reviewed the labs, radiology results, and diagnostic studies.    Laboratory Data:   Results from last 7 days   Lab Units 12/21/24  0959 12/20/24  0411 12/19/24  1214 12/19/24  0311 12/18/24  0102   WBC 10*3/mm3  --  3.93  --  5.01 9.85   HEMOGLOBIN g/dL 8.7* 7.7* 7.6* 7.5* 10.4*   HEMATOCRIT % 27.2* 24.4* 23.9* 24.1* 32.6*   PLATELETS 10*3/mm3  --  118*  --  156 260        Results from last 7 days   Lab Units 12/21/24  0959 12/20/24  0411 12/19/24  1214  12/18/24  1242 12/18/24 0102   SODIUM mmol/L 141 145 144   < > 138   POTASSIUM mmol/L 3.4* 4.8 3.2*   < > 3.4*   CHLORIDE mmol/L 115* 122* 118*   < > 103   CO2 mmol/L 18.0* 15.0* 15.0*   < > 15.0*   BUN mg/dL 17 27* 35*   < > 53*   CREATININE mg/dL 1.34* 1.67* 2.05*   < > 2.95*   CALCIUM mg/dL 9.0 8.3* 8.4*   < > 9.4   BILIRUBIN mg/dL  --   --   --   --  0.2   ALK PHOS U/L  --   --   --   --  89   ALT (SGPT) U/L  --   --   --   --  5   AST (SGOT) U/L  --   --   --   --  14   GLUCOSE mg/dL 126* 82 106*   < > 119*    < > = values in this interval not displayed.       Culture Data:     Microbiology Results (last 10 days)       Procedure Component Value - Date/Time    Blood Culture - Blood, Arm, Right [[id number]]  (Normal) Collected: 12/18/24 0110    Lab Status: Preliminary result Specimen: Blood from Arm, Right Updated: 12/21/24 0215     Blood Culture No growth at 3 days    COVID PRE-OP / PRE-PROCEDURE SCREENING ORDER (NO ISOLATION) - Swab, Nasal Cavity [[id number]]  (Normal) Collected: 12/18/24 0102    Lab Status: Final result Specimen: Swab from Nasal Cavity Updated: 12/18/24 0132    Narrative:      The following orders were created for panel order COVID PRE-OP / PRE-PROCEDURE SCREENING ORDER (NO ISOLATION) - Swab, Nasal Cavity.  Procedure                               Abnormality         Status                     ---------                               -----------         ------                     COVID-19 and FLU A/B PCR...[[id number]]  Normal              Final result                 Please view results for these tests on the individual orders.    COVID-19 and FLU A/B PCR, 1 HR TAT - Swab, Nasopharynx [[id number]]  (Normal) Collected: 12/18/24 0102    Lab Status: Final result Specimen: Swab from Nasopharynx Updated: 12/18/24 0132     COVID19 Not Detected     Influenza A PCR Not Detected     Influenza B PCR Not Detected    Narrative:      Fact sheet for providers: https://www.fda.gov/media/362864/download    Fact  sheet for patients: https://www.fda.gov/media/939165/download    Test performed by PCR.    Urine Culture - Urine, Straight Cath [[id number]]  (Abnormal)  (Susceptibility) Collected: 12/18/24 0102    Lab Status: Final result Specimen: Urine from Straight Cath Updated: 12/20/24 0943     Urine Culture >100,000 CFU/mL Escherichia coli    Narrative:      Colonization of the urinary tract without infection is common. Treatment is discouraged unless the patient is symptomatic, pregnant, or undergoing an invasive urologic procedure.    Susceptibility        Escherichia coli      CHULA      Amoxicillin + Clavulanate Susceptible      Ampicillin Susceptible      Ampicillin + Sulbactam Susceptible      Cefazolin (Urine) Susceptible      Cefepime Susceptible      Ceftazidime Susceptible      Ceftriaxone Susceptible      Gentamicin Susceptible      Levofloxacin Susceptible      Nitrofurantoin Susceptible      Piperacillin + Tazobactam Susceptible      Trimethoprim + Sulfamethoxazole Susceptible                           Blood Culture - Blood, Arm, Right [[id number]]  (Normal) Collected: 12/18/24 0102    Lab Status: Preliminary result Specimen: Blood from Arm, Right Updated: 12/21/24 0215     Blood Culture No growth at 3 days             Radiology Data:   Imaging Results (Last 7 Days)       Procedure Component Value Units Date/Time    MRI Brain Without Contrast [[id number]] Collected: 12/19/24 1658     Updated: 12/19/24 1704    Narrative:      MRI BRAIN WO CONTRAST- 12/19/2024 3:31 PM     HISTORY: stroke like symptoms; N17.9-Acute kidney failure, unspecified;  N39.0-Urinary tract infection, site not specified; A41.9-Sepsis,  unspecified organism; L89.90-Pressure ulcer of unspecified site,  unspecified stage; B37.2-Candidiasis of skin and nail; R41.82-Altered  mental status, unspecified; R13.10-Dysphagia, unspecified; Z74.09-Other  reduced mobility     TECHNIQUE: Multisequence, multiplanar MRI of the brain without contrast      COMPARISON: None     FINDINGS:     No diffusion signal abnormality. No intra-axial or extra-axial  hemorrhage. No intracranial mass lesion or mass effect. Mild chronic  small vessel ischemic changes. Ventricles are nondilated. Posterior  fossa structures are unremarkable. Pituitary gland and sella are  unremarkable. The major intracranial flow-voids are preserved. Orbital  contents are unremarkable. Chronic right maxillary sinus disease.  Partial right mastoid effusion. Normal marrow signal in the skull base  and calvarium.       Impression:         1.  No acute intracranial process. In particular, there is no acute  ischemia.  2.  Chronic small vessel ischemic changes.  3.  Chronic paranasal sinus disease.  4.  Partial right mastoid effusion.     This report was signed and finalized on 12/19/2024 5:01 PM by Dr Tenzin Madrigal.       US Renal Bilateral [[id number]] Collected: 12/18/24 1223     Updated: 12/18/24 1316    Addenda:        ADDENDUM: It is noted that the technologist scanned the kidneys outside  of our normal protocol scanning the left kidney first followed by the  right kidney. Therefore, the measurements of the kidneys are reversed.  The left kidney measures 10.0 cm in bsxo-rj-[id number] length and the right  kidney 8.9 cm in npky-ji-hpbp length. Otherwise no change from the  previous exam.     This report was signed and finalized on 12/18/2024 1:13 PM by Dr. Chi Hinds MD.     Signed: 12/18/24 1313 by Chi Hinds MD    Narrative:      RENAL ULTRASOUND COMPLETE 12/18/2024 10:57 AM     REASON FOR EXAM: prior hydronephrosis, recurrent TOMÁS; N17.9-Acute kidney  failure, unspecified; N39.0-Urinary tract infection, site not specified;  A41.9-Sepsis, unspecified organism; L89.90-Pressure ulcer of unspecified  site, unspecified stage; B37.2-Candidiasis of skin and nail;  R41.82-Altered mental status, unspecified; R13.10-Dysphagia, unspecified        COMPARISON: 11/15/2024     TECHNIQUE: Multiple  longitudinal and transverse realtime sonographic  images of the kidneys and urinary bladder are obtained.  Images and  report are stored in PACS per institutional and state regulations.     FINDINGS:     RIGHT KIDNEY: 10.0 cm. Normal in size, shape, contour and position. No  solid or cystic masses. The central echo complex is compact with no  evidence for hydronephrosis. No nephrolithiasis or abnormal perinephric  fluid collections . No hydroureter.     LEFT KIDNEY: 8.9 cm. Normal in size, shape, contour and position. No  solid or cystic masses. The central echo complex is compact with no  evidence for hydronephrosis. No nephrolithiasis or abnormal perinephric  fluid collections . No hydroureter.     PELVIS: There is some debris within the bladder but with no discrete  mass. The urinary bladder is otherwise unremarkable. There is no  surrounding ascites.       Impression:      1. Debris within the bladder without evidence of discrete bladder mass  or bladder wall thickening.  2. Normal sonogram of the kidneys. No mass or hydronephrosis..           This report was signed and finalized on 12/18/2024 12:25 PM by Dr. Chi Hinds MD.       CT Cervical Spine Without Contrast [[id number]] Collected: 12/18/24 0609     Updated: 12/18/24 0718    Narrative:      EXAMINATION: CT CERVICAL SPINE WO CONTRAST-      12/18/2024 12:01 AM     HISTORY: fall; N17.9-Acute kidney failure, unspecified; N39.0-Urinary  tract infection, site not specified; A41.9-Sepsis, unspecified organism;  L89.90-Pressure ulcer of unspecified site, unspecified stage;  B37.2-Candidiasis of skin and nail; R41.82-Altered mental status,  unspecified     In order to have a CT radiation dose as low as reasonably achievable  Automated Exposure Control was utilized for adjustment of the mA and/or  KV according to patient size.     Total DLP = 331.85 mGy.cm     The CT scan of the cervical spine is performed with delved intravenous  or intrathecal contrast  enhancement.     Images are acquired in axial plane and subsequent reconstruction in  coronal and sagittal planes.     Comparison is made with the previous study dated 9/4/2023.     There is no evidence of or compression.     No acute displacement or malalignment.     There is mild retrolisthesis of C5 over C6.     The curvature is maintained.     There is severe arthropathy of the atlantodental articulation with  complete obliteration of the atlantodental space.     There are chronic degenerative changes in the form of anterior and  posterior osteophytes, uncinate spurs and facet arthropathy. There is a  resultant mild narrowing of the left neuroforamina C2-3 and C3-4,  bilateral neuroforaminal stenosis at C4-5 C5-6 and C6-7. There is mild  spinal stenosis at C5-6.     Limited visualized prevertebral soft tissues are unremarkable. Thyroid  gland is incompletely visualized and not well evaluated.     Limited visualized lung apices are unremarkable.       Impression:      1. No acute fracture or malalignment.  2. Cervical spondylosis. Mild retrolisthesis C5 over C6.  3. Multilevel prominent disc osteophyte complexes, uncinate spurs and  facet arthropathy and resultant neural foraminal and spinal canal  stenoses are detailed above.     The above study was initially reviewed and reported by [facility]. I do not  find any discrepancies.                                            This report was signed and finalized on 12/18/2024 7:14 AM by Dr. Marty Suresh MD.       XR Chest 1 View [[id number]] Collected: 12/18/24 0649     Updated: 12/18/24 0653    Narrative:      EXAMINATION: XR CHEST 1 VW-     12/18/2024 12:35 AM     HISTORY: altered mentation; N17.9-Acute kidney failure, unspecified;  N39.0-Urinary tract infection, site not specified; A41.9-Sepsis,  unspecified organism; L89.90-Pressure ulcer of unspecified site,  unspecified stage; B37.2-Candidiasis of skin and nail; R41.82-Altered  mental status, unspecified      A frontal projection of the chest is compared with the previous study  dated 10/5/2024.     The lungs are moderately expanded, significantly improved since the  previous study.     The right lung apex is obscured by the bony and soft tissue structures  of the mandible.     There is no evidence of recent infiltrate, pleural effusion, pulmonary  congestion or pneumothorax.     Heart size is in the normal range. Atheromatous changes of the tortuous  thoracic aorta is noted.     There is deformity of the right distal clavicle representing previous  healed fracture. There is no acute bony abnormality.       Impression:      1. No active cardiopulmonary disease.        This report was signed and finalized on 12/18/2024 6:50 AM by Dr. Marty Suresh MD.       CT Head Without Contrast [[id number]] Collected: 12/18/24 0603     Updated: 12/18/24 0611    Narrative:      EXAMINATION: CT HEAD WO CONTRAST-      12/18/2024 12:01 AM     HISTORY: altered mentation/fall; N17.9-Acute kidney failure,  unspecified; N39.0-Urinary tract infection, site not specified;  A41.9-Sepsis, unspecified organism; L89.90-Pressure ulcer of unspecified  site, unspecified stage; B37.2-Candidiasis of skin and nail;  R41.82-Altered mental status, unspecified     In order to have a CT radiation dose as low as reasonably achievable  Automated Exposure Control was utilized for adjustment of the mA and/or  KV according to patient size.     Total DLP = 852.88 mGy.cm     The CT scan of the head is performed without intravenous contrast  enhancement.     The images are acquired in axial plane and subsequent reconstruction in  coronal and sagittal planes.     Comparison is made with the previous study dated 10/5/2024.     There are significant motion artifacts which lowers the diagnostic  quality of the study. A subtle lesion may not be evaluated or excluded.     There is no evidence of a mass. There is no midline shift.     There is no evidence of  intracranial hemorrhage or hematoma.     There is moderate dilatation of the ventricles, basal cisterns and the  cortical sulci suggestive chronic volume loss, similar to the previous  study.     Scattered areas of chronic white matter ischemia bilaterally noted. The  gray-white matter differentiation is maintained.     Very limited visualized posterior fossa structures are grossly  unremarkable.     Images reviewed in bone window show no acute displaced or depressed  skull fracture. A subtle nondisplaced fracture may be obscured due to  significant motion artifact. There is opacification of the right  maxillary antrum similar to the previous study representing chronic  inflammatory process. Mastoid air cells are clear.       Impression:      1. Significant motion artifacts may obscure a subtle intracranial  abnormality or lesion. No obvious/visible acute intracranial  abnormality. If clinically warranted further evaluation with repeat CT  scan of the head or MR imaging of the brain when the patient is able to  cooperate.  2. Chronic ischemic and atrophic changes.     The above study was initially reviewed and reported by [facility]. I do not  find any discrepancies.                                            This report was signed and finalized on 12/18/2024 6:08 AM by Dr. Marty Suresh MD.                I have reviewed the patient's current medications.     atorvastatin, 40 mg, Oral, Daily  carvedilol, 3.125 mg, Oral, BID With Meals  donepezil, 10 mg, Oral, Nightly  hydrocortisone-bacitracin-zinc oxide-nystatin, 1 Application, Topical, 4x Daily  levothyroxine, 50 mcg, Oral, Q AM  pantoprazole, 40 mg, Oral, Q AM  potassium chloride, 40 mEq, Oral, Q3H  [START ON 12/22/2024] potassium chloride, 20 mEq, Oral, Daily  QUEtiapine, 25 mg, Oral, Nightly  sertraline, 25 mg, Oral, Daily  sodium chloride, 10 mL, Intravenous, Q12H            Assessment/Plan   Assessment  Active Hospital Problems    Diagnosis     **Metabolic  encephalopathy     Acute cystitis     Hypokalemia     Essential hypertension     Acute renal failure superimposed on stage 3 chronic kidney disease        Treatment Plan  1.  Metabolic encephalopathy-resolved, fall precautions, PT/OT consults  2.  Acute cystitis, urine cultures pending, completed Rocephin 3-day therapy  3. Essential hypertension-resolved, resume carvedilol blood pressure stable., continuous cardiac monitoring, vital signs every 4 hours  4.  Acute renal failure superimposed on stage III CKD, normal saline 50/hr x 20 hours, labs in a.m.  5.  Hypokalemia, replace with 40 mEq x 2 today and start 20 mEq daily in a.m., labs in a.m.  6.  Home medications reviewed and restarted as appropriate, DVT prophylaxis with SCDs, continue to work closely with PT/OT, patient being followed by [facility] rehab bed available when investigation complete, wound care following-see orders    Medical Decision Making  Metabolic encephalopathy, acute, high complexity, resolved  Acute cystitis, acute acute, high complexity, resolved  Hypokalemia, acute, moderate complexity, ongoing/unchanged  Acute renal failure superimposed on stage III chronic kidney disease, acute, high complexity, improving  Number and Complexity of problems: 5  Differential Diagnosis: None    Conditions and Status        Condition is unchanged.     MDM Data  External documents reviewed: None  Cardiac tracing (EKG, telemetry) interpretation: Telemetry normal sinus rhythm 60-79  Radiology interpretation: Reviewed  Labs reviewed: Yes  Any tests that were considered but not ordered: None     Decision rules/scores evaluated (example [id number], Wells, etc): None     Discussed with: Patient, daughter, and Dr. Espitia     Care Planning  Shared decision making: Patient, daughter, and Dr. Espitia  Code status and discussions: Full  Surrogate Decision Maker: pierce Hogan    Disposition  Social Determinants of Health that impact  treatment or disposition: [facility] at discharge  I expect the patient to be discharged to [facility] in 1 to 2  days.     Electronically signed by APRIL Cody, 12/21/24, 11:18 CST.

## 2024-12-21 NOTE — PLAN OF CARE
Goal Outcome Evaluation:              Outcome Evaluation: Patient alert and more calm as day progressed. VSS. Was very agrivated with no other pain medication than Tylenol, but only complained of increased pain with need of brief change--once changed and repositioned, patient stated pain decreased. Safety maintained.

## 2024-12-22 RX ADMIN — ZINC OXIDE 1 APPLICATION: 200 OINTMENT TOPICAL at 07:54

## 2024-12-22 RX ADMIN — CARVEDILOL 3.12 MG: 3.12 TABLET, FILM COATED ORAL at 19:48

## 2024-12-22 RX ADMIN — SERTRALINE HYDROCHLORIDE 25 MG: 50 TABLET ORAL at 07:54

## 2024-12-22 RX ADMIN — CARVEDILOL 3.12 MG: 3.12 TABLET, FILM COATED ORAL at 07:53

## 2024-12-22 RX ADMIN — ZINC OXIDE 1 APPLICATION: 200 OINTMENT TOPICAL at 12:57

## 2024-12-22 RX ADMIN — POTASSIUM CHLORIDE 20 MEQ: 750 CAPSULE, EXTENDED RELEASE ORAL at 07:53

## 2024-12-22 RX ADMIN — QUETIAPINE FUMARATE 25 MG: 25 TABLET ORAL at 19:49

## 2024-12-22 RX ADMIN — ZINC OXIDE 1 APPLICATION: 200 OINTMENT TOPICAL at 19:49

## 2024-12-22 RX ADMIN — DONEPEZIL HYDROCHLORIDE 10 MG: 10 TABLET, FILM COATED ORAL at 19:49

## 2024-12-22 RX ADMIN — ACETAMINOPHEN 650 MG: 325 TABLET ORAL at 19:48

## 2024-12-22 RX ADMIN — PANTOPRAZOLE SODIUM 40 MG: 40 TABLET, DELAYED RELEASE ORAL at 05:26

## 2024-12-22 RX ADMIN — LEVOTHYROXINE SODIUM 50 MCG: 0.05 TABLET ORAL at 05:26

## 2024-12-22 RX ADMIN — ATORVASTATIN CALCIUM 40 MG: 40 TABLET, FILM COATED ORAL at 07:54

## 2024-12-22 NOTE — PROGRESS NOTES
"Patient Name: Jennifer Gomes  Date of Admission: 12/18/2024  Today's Date: 12/22/24  Length of Stay: 4  Primary Care Physician: Moiz Schwartz DO    Subjective   Chief Complaint: Altered mental status  HPI   Today: Patient lying in bed, awake and alert.  Patient agreeable to [facility] however states it we will have to wait until, \"there is somebody investigating something\".  I did discuss ongoing PT/OT.  She states, \"I just want to Kayla here and not get up\".  She also states, \"as a [id number] I would just as soon close my eyes and never wake up\".  She denies suicidal thoughts.  She states she is just ready to go.  Patient is complaining of pain in the buttocks region from skin breakdown.  Condition is stable.      Documented weights    12/18/24 0042 12/18/24 0434   Weight: 74.8 kg (165 lb) 65.5 kg (144 lb 6.4 oz)          Intake/Output Summary (Last 24 hours) at 12/22/2024 0817  Last data filed at 12/21/2024 1959  Gross per 24 hour   Intake 235.83 ml   Output 1 ml   Net 234.83 ml       Results for orders placed during the hospital encounter of 11/12/24    Adult Transthoracic Echo Complete W/ Cont if Necessary Per Protocol    Interpretation Summary    Left ventricular ejection fraction appears to be 66 - 70%.    Left ventricular wall thickness is consistent with mild concentric hypertrophy.    Left ventricular diastolic function is consistent with (grade Ia w/high LAP) impaired relaxation.    Left atrial volume is moderately increased.    Mild pulmonary hypertension is present.       Review of Systems   All pertinent negatives and positives are as above. All other systems have been reviewed and are negative unless otherwise stated.     Objective    Temp:  [97.5 °F (36.4 °C)-98.2 °F (36.8 °C)] 97.8 °F (36.6 °C)  Heart Rate:  [72-79] 79  Resp:  [16-17] 16  BP: (129-140)/(38-56) 140/56  Physical Exam  Constitutional:       Appearance: Normal appearance. She is ill-appearing.   HENT:      Head: Normocephalic and " atraumatic.      Mouth/Throat:      Mouth: Mucous membranes are moist.      Pharynx: Oropharynx is clear.   Eyes:      Extraocular Movements: Extraocular movements intact.      Conjunctiva/sclera: Conjunctivae normal.   Cardiovascular:      Rate and Rhythm: Normal rate and regular rhythm.      Pulses: Normal pulses.      Heart sounds: Normal heart sounds.   Pulmonary:      Effort: Pulmonary effort is normal.      Breath sounds: Normal breath sounds.   Abdominal:      General: There is no distension.      Palpations: Abdomen is soft.      Tenderness: There is no abdominal tenderness.   Musculoskeletal:      Cervical back: Normal range of motion and neck supple.      Right lower leg: No edema.      Left lower leg: No edema.      Comments: Generalized weakness   Skin:     General: Skin is warm and dry.      Findings: Bruising, erythema (Diffuse excoriation on buttocks and IFRAH area slowly improving) and rash (Under right breast) present.      Comments: Bilateral heel protectors in place  Pictures in chart for additional wounds   Neurological:      General: No focal deficit present.      Mental Status: She is oriented to person, place, and time.   Psychiatric:         Attention and Perception: Attention normal.         Mood and Affect: Affect is flat.         Speech: Speech normal.         Behavior: Behavior normal.         Cognition and Memory: Memory is impaired.       Results Review:  I have reviewed the labs, radiology results, and diagnostic studies.    Laboratory Data:   Results from last 7 days   Lab Units 12/21/24  0959 12/20/24  0411 12/19/24  1214 12/19/24  0311 12/18/24  0102   WBC 10*3/mm3  --  3.93  --  5.01 9.85   HEMOGLOBIN g/dL 8.7* 7.7* 7.6* 7.5* 10.4*   HEMATOCRIT % 27.2* 24.4* 23.9* 24.1* 32.6*   PLATELETS 10*3/mm3  --  118*  --  156 260        Results from last 7 days   Lab Units 12/21/24  0959 12/20/24  0411 12/19/24  1214 12/18/24  1242 12/18/24  0102   SODIUM mmol/L 141 145 144   < > 138    POTASSIUM mmol/L 3.4* 4.8 3.2*   < > 3.4*   CHLORIDE mmol/L 115* 122* 118*   < > 103   CO2 mmol/L 18.0* 15.0* 15.0*   < > 15.0*   BUN mg/dL 17 27* 35*   < > 53*   CREATININE mg/dL 1.34* 1.67* 2.05*   < > 2.95*   CALCIUM mg/dL 9.0 8.3* 8.4*   < > 9.4   BILIRUBIN mg/dL  --   --   --   --  0.2   ALK PHOS U/L  --   --   --   --  89   ALT (SGPT) U/L  --   --   --   --  5   AST (SGOT) U/L  --   --   --   --  14   GLUCOSE mg/dL 126* 82 106*   < > 119*    < > = values in this interval not displayed.       Culture Data:     Microbiology Results (last 10 days)       Procedure Component Value - Date/Time    Blood Culture - Blood, Arm, Right [[id number]]  (Normal) Collected: 12/18/24 0110    Lab Status: Preliminary result Specimen: Blood from Arm, Right Updated: 12/22/24 0215     Blood Culture No growth at 4 days    COVID PRE-OP / PRE-PROCEDURE SCREENING ORDER (NO ISOLATION) - Swab, Nasal Cavity [[id number]]  (Normal) Collected: 12/18/24 0102    Lab Status: Final result Specimen: Swab from Nasal Cavity Updated: 12/18/24 0132    Narrative:      The following orders were created for panel order COVID PRE-OP / PRE-PROCEDURE SCREENING ORDER (NO ISOLATION) - Swab, Nasal Cavity.  Procedure                               Abnormality         Status                     ---------                               -----------         ------                     COVID-19 and FLU A/B PCR...[[id number]]  Normal              Final result                 Please view results for these tests on the individual orders.    COVID-19 and FLU A/B PCR, 1 HR TAT - Swab, Nasopharynx [[id number]]  (Normal) Collected: 12/18/24 0102    Lab Status: Final result Specimen: Swab from Nasopharynx Updated: 12/18/24 0132     COVID19 Not Detected     Influenza A PCR Not Detected     Influenza B PCR Not Detected    Narrative:      Fact sheet for providers: https://www.fda.gov/media/768295/download    Fact sheet for patients: https://www.fda.gov/media/047375/download    Test  performed by PCR.    Urine Culture - Urine, Straight Cath [[id number]]  (Abnormal)  (Susceptibility) Collected: 12/18/24 0102    Lab Status: Final result Specimen: Urine from Straight Cath Updated: 12/20/24 0943     Urine Culture >100,000 CFU/mL Escherichia coli    Narrative:      Colonization of the urinary tract without infection is common. Treatment is discouraged unless the patient is symptomatic, pregnant, or undergoing an invasive urologic procedure.    Susceptibility        Escherichia coli      CHULA      Amoxicillin + Clavulanate Susceptible      Ampicillin Susceptible      Ampicillin + Sulbactam Susceptible      Cefazolin (Urine) Susceptible      Cefepime Susceptible      Ceftazidime Susceptible      Ceftriaxone Susceptible      Gentamicin Susceptible      Levofloxacin Susceptible      Nitrofurantoin Susceptible      Piperacillin + Tazobactam Susceptible      Trimethoprim + Sulfamethoxazole Susceptible                           Blood Culture - Blood, Arm, Right [[id number]]  (Normal) Collected: 12/18/24 0102    Lab Status: Preliminary result Specimen: Blood from Arm, Right Updated: 12/22/24 0215     Blood Culture No growth at 4 days             Radiology Data:   Imaging Results (Last 7 Days)       Procedure Component Value Units Date/Time    MRI Brain Without Contrast [[id number]] Collected: 12/19/24 1658     Updated: 12/19/24 1704    Narrative:      MRI BRAIN WO CONTRAST- 12/19/2024 3:31 PM     HISTORY: stroke like symptoms; N17.9-Acute kidney failure, unspecified;  N39.0-Urinary tract infection, site not specified; A41.9-Sepsis,  unspecified organism; L89.90-Pressure ulcer of unspecified site,  unspecified stage; B37.2-Candidiasis of skin and nail; R41.82-Altered  mental status, unspecified; R13.10-Dysphagia, unspecified; Z74.09-Other  reduced mobility     TECHNIQUE: Multisequence, multiplanar MRI of the brain without contrast     COMPARISON: None     FINDINGS:     No diffusion signal abnormality. No  intra-axial or extra-axial  hemorrhage. No intracranial mass lesion or mass effect. Mild chronic  small vessel ischemic changes. Ventricles are nondilated. Posterior  fossa structures are unremarkable. Pituitary gland and sella are  unremarkable. The major intracranial flow-voids are preserved. Orbital  contents are unremarkable. Chronic right maxillary sinus disease.  Partial right mastoid effusion. Normal marrow signal in the skull base  and calvarium.       Impression:         1.  No acute intracranial process. In particular, there is no acute  ischemia.  2.  Chronic small vessel ischemic changes.  3.  Chronic paranasal sinus disease.  4.  Partial right mastoid effusion.     This report was signed and finalized on 12/19/2024 5:01 PM by Dr Tenzin Madrigal.       US Renal Bilateral [[id number]] Collected: 12/18/24 1223     Updated: 12/18/24 1316    Addenda:        ADDENDUM: It is noted that the technologist scanned the kidneys outside  of our normal protocol scanning the left kidney first followed by the  right kidney. Therefore, the measurements of the kidneys are reversed.  The left kidney measures 10.0 cm in [id number]-[id number] length and the right  kidney 8.9 cm in grwy-os-rncd length. Otherwise no change from the  previous exam.     This report was signed and finalized on 12/18/2024 1:13 PM by Dr. Chi Hinds MD.     Signed: 12/18/24 1313 by Chi Hinds MD    Narrative:      RENAL ULTRASOUND COMPLETE 12/18/2024 10:57 AM     REASON FOR EXAM: prior hydronephrosis, recurrent TOMÁS; N17.9-Acute kidney  failure, unspecified; N39.0-Urinary tract infection, site not specified;  A41.9-Sepsis, unspecified organism; L89.90-Pressure ulcer of unspecified  site, unspecified stage; B37.2-Candidiasis of skin and nail;  R41.82-Altered mental status, unspecified; R13.10-Dysphagia, unspecified        COMPARISON: 11/15/2024     TECHNIQUE: Multiple longitudinal and transverse realtime sonographic  images of the kidneys and  urinary bladder are obtained.  Images and  report are stored in PACS per institutional and state regulations.     FINDINGS:     RIGHT KIDNEY: 10.0 cm. Normal in size, shape, contour and position. No  solid or cystic masses. The central echo complex is compact with no  evidence for hydronephrosis. No nephrolithiasis or abnormal perinephric  fluid collections . No hydroureter.     LEFT KIDNEY: 8.9 cm. Normal in size, shape, contour and position. No  solid or cystic masses. The central echo complex is compact with no  evidence for hydronephrosis. No nephrolithiasis or abnormal perinephric  fluid collections . No hydroureter.     PELVIS: There is some debris within the bladder but with no discrete  mass. The urinary bladder is otherwise unremarkable. There is no  surrounding ascites.       Impression:      1. Debris within the bladder without evidence of discrete bladder mass  or bladder wall thickening.  2. Normal sonogram of the kidneys. No mass or hydronephrosis..           This report was signed and finalized on 12/18/2024 12:25 PM by Dr. Chi Hinds MD.       CT Cervical Spine Without Contrast [[id number]] Collected: 12/18/24 0609     Updated: 12/18/24 0718    Narrative:      EXAMINATION: CT CERVICAL SPINE WO CONTRAST-      12/18/2024 12:01 AM     HISTORY: fall; N17.9-Acute kidney failure, unspecified; N39.0-Urinary  tract infection, site not specified; A41.9-Sepsis, unspecified organism;  L89.90-Pressure ulcer of unspecified site, unspecified stage;  B37.2-Candidiasis of skin and nail; R41.82-Altered mental status,  unspecified     In order to have a CT radiation dose as low as reasonably achievable  Automated Exposure Control was utilized for adjustment of the mA and/or  KV according to patient size.     Total DLP = 331.85 mGy.cm     The CT scan of the cervical spine is performed with delved intravenous  or intrathecal contrast enhancement.     Images are acquired in axial plane and subsequent reconstruction  in  coronal and sagittal planes.     Comparison is made with the previous study dated 9/4/2023.     There is no evidence of or compression.     No acute displacement or malalignment.     There is mild retrolisthesis of C5 over C6.     The curvature is maintained.     There is severe arthropathy of the atlantodental articulation with  complete obliteration of the atlantodental space.     There are chronic degenerative changes in the form of anterior and  posterior osteophytes, uncinate spurs and facet arthropathy. There is a  resultant mild narrowing of the left neuroforamina C2-3 and C3-4,  bilateral neuroforaminal stenosis at C4-5 C5-6 and C6-7. There is mild  spinal stenosis at C5-6.     Limited visualized prevertebral soft tissues are unremarkable. Thyroid  gland is incompletely visualized and not well evaluated.     Limited visualized lung apices are unremarkable.       Impression:      1. No acute fracture or malalignment.  2. Cervical spondylosis. Mild retrolisthesis C5 over C6.  3. Multilevel prominent disc osteophyte complexes, uncinate spurs and  facet arthropathy and resultant neural foraminal and spinal canal  stenoses are detailed above.     The above study was initially reviewed and reported by [facility]. I do not  find any discrepancies.                                            This report was signed and finalized on 12/18/2024 7:14 AM by Dr. Marty Suresh MD.       XR Chest 1 View [[id number]] Collected: 12/18/24 0649     Updated: 12/18/24 0653    Narrative:      EXAMINATION: XR CHEST 1 VW-     12/18/2024 12:35 AM     HISTORY: altered mentation; N17.9-Acute kidney failure, unspecified;  N39.0-Urinary tract infection, site not specified; A41.9-Sepsis,  unspecified organism; L89.90-Pressure ulcer of unspecified site,  unspecified stage; B37.2-Candidiasis of skin and nail; R41.82-Altered  mental status, unspecified     A frontal projection of the chest is compared with the previous study  dated  10/5/2024.     The lungs are moderately expanded, significantly improved since the  previous study.     The right lung apex is obscured by the bony and soft tissue structures  of the mandible.     There is no evidence of recent infiltrate, pleural effusion, pulmonary  congestion or pneumothorax.     Heart size is in the normal range. Atheromatous changes of the tortuous  thoracic aorta is noted.     There is deformity of the right distal clavicle representing previous  healed fracture. There is no acute bony abnormality.       Impression:      1. No active cardiopulmonary disease.        This report was signed and finalized on 12/18/2024 6:50 AM by Dr. Marty Suresh MD.       CT Head Without Contrast [[id number]] Collected: 12/18/24 0603     Updated: 12/18/24 0611    Narrative:      EXAMINATION: CT HEAD WO CONTRAST-      12/18/2024 12:01 AM     HISTORY: altered mentation/fall; N17.9-Acute kidney failure,  unspecified; N39.0-Urinary tract infection, site not specified;  A41.9-Sepsis, unspecified organism; L89.90-Pressure ulcer of unspecified  site, unspecified stage; B37.2-Candidiasis of skin and nail;  R41.82-Altered mental status, unspecified     In order to have a CT radiation dose as low as reasonably achievable  Automated Exposure Control was utilized for adjustment of the mA and/or  KV according to patient size.     Total DLP = 852.88 mGy.cm     The CT scan of the head is performed without intravenous contrast  enhancement.     The images are acquired in axial plane and subsequent reconstruction in  coronal and sagittal planes.     Comparison is made with the previous study dated 10/5/2024.     There are significant motion artifacts which lowers the diagnostic  quality of the study. A subtle lesion may not be evaluated or excluded.     There is no evidence of a mass. There is no midline shift.     There is no evidence of intracranial hemorrhage or hematoma.     There is moderate dilatation of the  ventricles, basal cisterns and the  cortical sulci suggestive chronic volume loss, similar to the previous  study.     Scattered areas of chronic white matter ischemia bilaterally noted. The  gray-white matter differentiation is maintained.     Very limited visualized posterior fossa structures are grossly  unremarkable.     Images reviewed in bone window show no acute displaced or depressed  skull fracture. A subtle nondisplaced fracture may be obscured due to  significant motion artifact. There is opacification of the right  maxillary antrum similar to the previous study representing chronic  inflammatory process. Mastoid air cells are clear.       Impression:      1. Significant motion artifacts may obscure a subtle intracranial  abnormality or lesion. No obvious/visible acute intracranial  abnormality. If clinically warranted further evaluation with repeat CT  scan of the head or MR imaging of the brain when the patient is able to  cooperate.  2. Chronic ischemic and atrophic changes.     The above study was initially reviewed and reported by [facility]. I do not  find any discrepancies.                                            This report was signed and finalized on 12/18/2024 6:08 AM by Dr. Marty Suresh MD.                I have reviewed the patient's current medications.     atorvastatin, 40 mg, Oral, Daily  carvedilol, 3.125 mg, Oral, BID With Meals  donepezil, 10 mg, Oral, Nightly  hydrocortisone-bacitracin-zinc oxide-nystatin, 1 Application, Topical, 4x Daily  levothyroxine, 50 mcg, Oral, Q AM  pantoprazole, 40 mg, Oral, Q AM  potassium chloride, 20 mEq, Oral, Daily  QUEtiapine, 25 mg, Oral, Nightly  sertraline, 25 mg, Oral, Daily  sodium chloride, 10 mL, Intravenous, Q12H            Assessment/Plan   Assessment  Active Hospital Problems    Diagnosis     **Metabolic encephalopathy     Acute cystitis     Hypokalemia     Essential hypertension     Acute renal failure superimposed on stage 3 chronic  kidney disease        Treatment Plan  1.  Metabolic encephalopathy-resolved, fall precautions, PT/OT working with patient, patient is reluctant to process the pain  2.  Acute cystitis, urine cultures-E. coli , completed Rocephin 3-day therapy  3. Essential hypertension-resolved, continue current antihypertensives, continuous cardiac monitoring, vital signs every 4 hours  4.  Acute renal failure superimposed on stage III CKD, normal saline 50/hr x 20 hours, labs in a.m (refused today 12/22).  5.  Hypokalemia, replace with 40 mEq x 2 today and start 20 mEq daily in a.m., labs in a.m.  6.  Home medications reviewed and restarted as appropriate, DVT prophylaxis with SCDs, continue to work closely with PT/OT, patient being followed by Adult Protective Services-[facility] rehab bed available when investigation complete, wound care following-see orders    Medical Decision Making  Metabolic encephalopathy, acute, high complexity, resolved  Acute cystitis, acute acute, high complexity, resolved  Hypokalemia, acute, moderate complexity, ongoing/unchanged  Acute renal failure superimposed on stage III chronic kidney disease, acute, high complexity, improving  Number and Complexity of problems: 5  Differential Diagnosis: None    Conditions and Status        Condition is unchanged.     MDM Data  External documents reviewed: None  Cardiac tracing (EKG, telemetry) interpretation: Telemetry normal sinus rhythm 60-79  Radiology interpretation: Reviewed  Labs reviewed: Yes  Any tests that were considered but not ordered: None     Decision rules/scores evaluated (example [id number], [place], etc): None     Discussed with: Patient, daughter, and Dr. Espitia     Care Planning  Shared decision making: Patient, daughter, and Dr. Espitia  Code status and discussions: Full  Surrogate Decision Maker: Ivonne Liang, pierce    Disposition  Social Determinants of Health that impact treatment or disposition: [facility] at discharge  I expect the  patient to be discharged to [facility] in 1 to 2  days.     Electronically signed by APRIL Cody, 12/22/24, 08:17 CST.

## 2024-12-22 NOTE — PLAN OF CARE
Goal Outcome Evaluation:              Outcome Evaluation: VSS. S 66-80. pt had c/o pain this shift and recieved medication per physician orders. no attempts to exit bed wihout staff assistance. remains incontinent. call light within reach. safety maintained. conitnuing with current plan of care.

## 2024-12-23 LAB
ANION GAP SERPL CALCULATED.3IONS-SCNC: 7 MMOL/L (ref 5–15)
BACTERIA SPEC AEROBE CULT: NORMAL
BACTERIA SPEC AEROBE CULT: NORMAL
BUN SERPL-MCNC: 16 MG/DL (ref 8–23)
BUN/CREAT SERPL: 13.7 (ref 7–25)
CALCIUM SPEC-SCNC: 9 MG/DL (ref 8.6–10.5)
CHLORIDE SERPL-SCNC: 115 MMOL/L (ref 98–107)
CO2 SERPL-SCNC: 18 MMOL/L (ref 22–29)
CREAT SERPL-MCNC: 1.17 MG/DL (ref 0.57–1)
EGFRCR SERPLBLD CKD-EPI 2021: 46.7 ML/MIN/1.73
GLUCOSE SERPL-MCNC: 150 MG/DL (ref 65–99)
POTASSIUM SERPL-SCNC: 4.3 MMOL/L (ref 3.5–5.2)
SODIUM SERPL-SCNC: 140 MMOL/L (ref 136–145)

## 2024-12-23 PROCEDURE — 99497 ADVNCD CARE PLAN 30 MIN: CPT | Performed by: CLINICAL NURSE SPECIALIST

## 2024-12-23 PROCEDURE — 99233 SBSQ HOSP IP/OBS HIGH 50: CPT | Performed by: CLINICAL NURSE SPECIALIST

## 2024-12-23 PROCEDURE — 80048 BASIC METABOLIC PNL TOTAL CA: CPT | Performed by: NURSE PRACTITIONER

## 2024-12-23 PROCEDURE — 97530 THERAPEUTIC ACTIVITIES: CPT

## 2024-12-23 PROCEDURE — 25010000002 CEFTRIAXONE PER 250 MG: Performed by: HOSPITALIST

## 2024-12-23 RX ADMIN — ACETAMINOPHEN 650 MG: 325 TABLET ORAL at 23:36

## 2024-12-23 RX ADMIN — DONEPEZIL HYDROCHLORIDE 10 MG: 10 TABLET, FILM COATED ORAL at 21:08

## 2024-12-23 RX ADMIN — QUETIAPINE FUMARATE 25 MG: 25 TABLET ORAL at 21:08

## 2024-12-23 RX ADMIN — LEVOTHYROXINE SODIUM 50 MCG: 0.05 TABLET ORAL at 05:42

## 2024-12-23 RX ADMIN — ACETAMINOPHEN 650 MG: 325 TABLET ORAL at 15:17

## 2024-12-23 RX ADMIN — CARVEDILOL 3.12 MG: 3.12 TABLET, FILM COATED ORAL at 17:16

## 2024-12-23 RX ADMIN — ZINC OXIDE 1 APPLICATION: 200 OINTMENT TOPICAL at 07:56

## 2024-12-23 RX ADMIN — ATORVASTATIN CALCIUM 40 MG: 40 TABLET, FILM COATED ORAL at 08:00

## 2024-12-23 RX ADMIN — PANTOPRAZOLE SODIUM 40 MG: 40 TABLET, DELAYED RELEASE ORAL at 05:42

## 2024-12-23 RX ADMIN — ZINC OXIDE 1 APPLICATION: 200 OINTMENT TOPICAL at 12:11

## 2024-12-23 RX ADMIN — CARVEDILOL 3.12 MG: 3.12 TABLET, FILM COATED ORAL at 07:56

## 2024-12-23 RX ADMIN — SODIUM CHLORIDE 1000 MG: 900 INJECTION INTRAVENOUS at 12:11

## 2024-12-23 RX ADMIN — Medication 10 ML: at 21:10

## 2024-12-23 RX ADMIN — ZINC OXIDE 1 APPLICATION: 200 OINTMENT TOPICAL at 17:16

## 2024-12-23 RX ADMIN — POTASSIUM CHLORIDE 20 MEQ: 750 CAPSULE, EXTENDED RELEASE ORAL at 08:01

## 2024-12-23 RX ADMIN — ZINC OXIDE 1 APPLICATION: 200 OINTMENT TOPICAL at 21:08

## 2024-12-23 RX ADMIN — SERTRALINE HYDROCHLORIDE 25 MG: 50 TABLET ORAL at 08:00

## 2024-12-23 NOTE — PLAN OF CARE
Goal Outcome Evaluation:      Patient agreed to sit EOB. CGA for supine to sit. Patient has good sitting balance. Patient actively worked thru LE exercises. Patient was able to reach in all directions, just very limited trunk movement. Patient tolerated sitting EOB for 20 minutes. Min assist for sit to supine.   Patient is very weak. Patient will benefit from rehab prior to D/C home.

## 2024-12-23 NOTE — DISCHARGE PLACEMENT REQUEST
"Jennifer Soliman (84 y.o. Female)       Date of Birth   1940    Social Security Number       Address   [address] [facility] [address]    Home Phone   [phone]    MRN   [id number]       Latter day   Mandaen    Marital Status                               Admission Date   12/18/24    Admission Type   Emergency    Admitting Provider   Dheeraj Kent MD    Attending Provider   Dheeraj Kent MD    Department, Room/Bed   [facility] 4B, 440/1       Discharge Date       Discharge Disposition       Discharge Destination                                 Attending Provider: Dheeraj Kent MD    Allergies: Ropinirole Hcl, Codeine, Definity [Perflutren Lipid Microsphere], Ambien [Zolpidem], Eszopiclone, Pregabalin, Tizanidine    Isolation: None   Infection: None   Code Status: No CPR    Ht: 157.5 cm (62\")   Wt: 65.5 kg (144 lb 6.4 oz)    Admission Cmt: None   Principal Problem: Metabolic encephalopathy [G93.41]                   Active Insurance as of 12/18/2024       Primary Coverage       Payor Plan Insurance Group Employer/Plan Group    [facility] MEDICARE REPLACEMENT [facility] MED ADV GROUP [id number]       Payor Plan Address Payor Plan Phone Number Payor Plan Fax Number Effective Dates    [address] [address]   5/1/2024 - None Entered    [facility] [id number]         Subscriber Name Subscriber Birth Date Member ID       JENNIFER SOLIMAN 1940 [id number]                     Emergency Contacts        (Rel.) Home Phone Work Phone Mobile Phone    AzulClaraLola (Daughter) [phone] -- --    SolimnaAdams harry (Spouse) [phone] -- --                 History & Physical        Antonino Braden MD at 12/18/24 0308              [facility]  HISTORY AND PHYSICAL    Date of Admission: 12/18/2024  Primary Care Physician: Moiz Schwartz DO Subjective   Primary Historian: Chart     Chief Complaint: Altered mental " status    History of Present Illness  This is a 84-year-old lady with past medical history significant for hypertension hyperlipidemia depression fibromyalgia who was recently discharged from a nursing home 3 days ago who was brought into the emergency department earlier tonight by EMS with increased confusion and also sustaining a fall today.  According to records she was reported that she was found to be in soiled briefs upon arrival to the ER she was anxious and confused.        Review of Systems   Unavailable given that the patient is lethargic and does not follow any commands.    Past Medical History:   Past Medical History:   Diagnosis Date    Anxiety     Arthritis     Bronchitis     Cancer     uterine    Chronic pain     Depression     Disease of thyroid gland     Fibromyalgia     GERD (gastroesophageal reflux disease)     Headache     History of transfusion     AS     Hyperlipidemia     Hypertension     Incontinence     Insomnia     Leg pain     Lumbar stenosis     Migraines     Peptic ulcer     Restless legs     Sleep apnea     NO C-PAP    UTI (urinary tract infection)     Vaginal bleeding      Past Surgical History:  Past Surgical History:   Procedure Laterality Date    BLADDER REPAIR      MESH HAD TO BE REMOVED IN 2013    BREAST BIOPSY Right 2017    benign    BREAST CYST EXCISION Left     CARDIAC CATHETERIZATION      CARPAL TUNNEL RELEASE      CATARACT EXTRACTION W/ INTRAOCULAR LENS  IMPLANT, BILATERAL      CHOLECYSTECTOMY WITH INTRAOPERATIVE CHOLANGIOGRAM N/A 2023    Procedure: CHOLECYSTECTOMY LAPAROSCOPIC INTRAOPERATIVE CHOLANGIOGRAM;  Surgeon: Gerardo Arciniega MD;  Location: [facility] OR;  Service: General;  Laterality: N/A;    COLONOSCOPY      COLONOSCOPY N/A 10/01/2021    Procedure: COLONOSCOPY WITH ANESTHESIA;  Surgeon: Tom Velasco DO;  Location: [facility] ENDOSCOPY;  Service: Gastroenterology;  Laterality: N/A;  pre: change in bowel habits  post: diverticulosis. hemorrhoids.    Olivia Mora, MORGAN        CYSTECTOMY      D & C HYSTEROSCOPY N/A 11/06/2017    Procedure: DILATATION AND CURETTAGE HYSTEROSCOPY;  Surgeon: Shasta Madrigal MD;  Location: [facility] OR;  Service:     DILATION AND CURETTAGE, DIAGNOSTIC / THERAPEUTIC  2008    ENDOSCOPY  09/23/2010    Short segment of Arriola's,Moderate chroninc esophagogastritis and negative H.pylori    ENDOSCOPY N/A 09/25/2017    Procedure: ESOPHAGOGASTRODUODENOSCOPY WITH ANESTHESIA;  Surgeon: Tom Velasco DO;  Location: [facility] ENDOSCOPY;  Service:     EYE SURGERY      RETINA    HEMORRHOIDECTOMY SIGMOIDOSCOPY N/A 3/21/2023    Procedure: HEMORRHOIDECTOMY WITH EXAM UNDER ANESTHESIA;  Surgeon: Holly Chavez MD;  Location: [facility] OR;  Service: General;  Laterality: N/A;    HYSTERECTOMY  12/20/2017    ORIF TIBIA/FIBULA FRACTURES Left 2000    TRANSVAGINAL TAPING SUSPENSION N/A 11/06/2017    Procedure: VAGINAL MESH REVISION;  Surgeon: Shasta Madrigal MD;  Location: [facility] OR;  Service:     VAGINAL MESH REVISION  2013     Social History:  reports that she has never smoked. She has never used smokeless tobacco. She reports that she does not currently use alcohol. She reports that she does not use drugs.    Family History: family history includes Cancer in her paternal grandmother; Diabetes in her mother and sister; Lung cancer in her paternal grandfather; Lymphoma in her brother; Multiple myeloma in her mother; Ovarian cancer in her paternal aunt; Prostate cancer in her brother; Stroke in her father.       Allergies:  Allergies   Allergen Reactions    Ropinirole Hcl Shortness Of Breath    Codeine Itching and Mental Status Change    Definity [Perflutren Lipid Microsphere] Other (See Comments)     Severe back pain    Ambien [Zolpidem] Other (See Comments)     HYPER     Eszopiclone Other (See Comments)     MAKES PT HYPER     Pregabalin Dizziness    Tizanidine Other (See Comments)     Terrible nightmares       Medications:  Prior to Admission  medications    Medication Sig Start Date End Date Taking? Authorizing Provider   acetaminophen (TYLENOL) 325 MG tablet Take 2 tablets by mouth Every 6 (Six) Hours As Needed for Mild Pain.    Tamiko Gaviria MD   amLODIPine (NORVASC) 5 MG tablet Take 1 tablet by mouth Daily. 10/15/24   Abdirahman Schroeder MD   atorvastatin (LIPITOR) 40 MG tablet Take 1 tablet by mouth Daily.    Tamiko Gaviria MD   bisacodyl (DULCOLAX) 10 MG suppository Insert 1 suppository into the rectum Daily As Needed for Constipation (Use if bisacodyl oral is ineffective). 11/18/24   Cuba Espitia MD   bisacodyl (DULCOLAX) 5 MG EC tablet Take 1 tablet by mouth Daily As Needed for Constipation (Use if polyethylene glycol is ineffective). 11/18/24   Cuba Espitia MD   carvedilol (COREG) 3.125 MG tablet Take 1 tablet by mouth 2 (Two) Times a Day With Meals for 30 days. 11/18/24 12/18/24  Cuba Espitia MD   donepezil (ARICEPT) 10 MG tablet Take 1 tablet by mouth Every Night. 10/18/22   Tamiko Gaviria MD   ergocalciferol (ERGOCALCIFEROL) 53873 units capsule Take 1 capsule by mouth 1 (One) Time Per Week. Saturday 4/17/19   Tamiko Gaviria MD   lansoprazole (PREVACID) 30 MG capsule Take 1 capsule by mouth Daily.    Tamiko Gaviria MD   levothyroxine (SYNTHROID, LEVOTHROID) 50 MCG tablet Take 1 tablet by mouth Daily.    Tamiko Gaviria MD   melatonin 5 MG tablet tablet Take 2 tablets by mouth At Night As Needed (insomnia) for up to 30 days. 11/18/24 12/18/24  Cuba Espitia MD   naloxone (NARCAN) 4 MG/0.1ML nasal spray Administer 1 spray into the nostril(s) as directed by provider As Needed for Opioid Reversal.    Tamiko Gaviria MD   oxyCODONE (ROXICODONE) 15 MG immediate release tablet TAKE 1 TABLET BY MOUTH FOUR TIMES DAILY. MUST LAST 30 DAYS 9/28/24   Tamiko Gaviria MD   polyethylene glycol 17 g packet Take 17 g by mouth Daily. 10/15/24   Abdirahman Schroeder MD   QUEtiapine  "(SEROquel) 25 MG tablet Take 1 tablet by mouth Every Night for 30 days. 11/18/24 12/18/24  Cuba Espitia MD   sennosides-docusate (PERICOLACE) 8.6-50 MG per tablet Take 2 tablets by mouth 2 (Two) Times a Day. 10/15/24   Abdirahman Schroeder MD   sennosides-docusate (PERICOLACE) 8.6-50 MG per tablet Take 2 tablets by mouth 2 (Two) Times a Day. 11/18/24   Cuba Espitia MD   sertraline (ZOLOFT) 25 MG tablet Take 1 tablet by mouth Daily.    Provider, MD Tamiko   SUMAtriptan (IMITREX) 50 MG tablet Daily. 6/13/24   Provider, MD LYRIC Morrison have utilized all available immediate resources to obtain, update, or review the patient's current medications (including all prescriptions, over-the-counter products, herbals, cannabis/cannabidiol products, and vitamin/mineral/dietary (nutritional) supplements).    Objective     Vital Signs: /42   Pulse 81   Temp 98 °F (36.7 °C) (Axillary)   Resp 20   Ht 157.5 cm (62\")   Wt 74.8 kg (165 lb)   LMP  (LMP Unknown)   SpO2 99%   BMI 30.18 kg/m²   Physical Exam   General: Patient is sleeping, difficult to arouse.  HEENT: Normocephalic, atraumatic, pupils are equal reactive to light and accommodate  Neck: Supple, no JVD, no carotid bruits  CVS: S1, S2, regular rhythm and rate  Lungs: Clear to auscultation bilaterally  Abdomen: Soft, nontender, nondistended bowel sounds are present  Extremities: No cyanosis, no clubbing, no edema pulses are intact  Neurologic: No focal deficits  Psychiatric: Unable to assess      Results Reviewed:  Lab Results (last 24 hours)       Procedure Component Value Units Date/Time    Blood Culture - Blood, Arm, Right [[id number]] Collected: 12/18/24 0102    Specimen: Blood from Arm, Right Updated: 12/18/24 0212    Blood Culture - Blood, Arm, Right [[id number]] Collected: 12/18/24 0110    Specimen: Blood from Arm, Right Updated: 12/18/24 0212    Urinalysis With Culture If Indicated - Straight Cath [[id number]]  (Abnormal) Collected: " 12/18/24 0102    Specimen: Urine from Straight Cath Updated: 12/18/24 0142     Color, UA Yellow     Appearance, UA Turbid     pH, UA 5.5     Specific Gravity, UA 1.019     Glucose, UA Negative     Ketones, UA Negative     Bilirubin, UA Negative     Blood, UA Trace     Protein, UA 30 mg/dL (1+)     Leuk Esterase, UA Large (3+)     Nitrite, UA Positive     Urobilinogen, UA 1.0 E.U./dL    Narrative:      In absence of clinical symptoms, the presence of pyuria, bacteria, and/or nitrites on the urinalysis result does not correlate with infection.    Urinalysis, Microscopic Only - Straight Cath [[id number]]  (Abnormal) Collected: 12/18/24 0102    Specimen: Urine from Straight Cath Updated: 12/18/24 0142     RBC, UA 6-10 /HPF      WBC, UA Too Numerous to Count /HPF      Bacteria, UA 4+ /HPF      Squamous Epithelial Cells, UA 0-2 /HPF      Hyaline Casts, UA 0-2 /LPF      Methodology Automated Microscopy    Urine Culture - Urine, Straight Cath [[id number]] Collected: 12/18/24 0102    Specimen: Urine from Straight Cath Updated: 12/18/24 0142    Lactic Acid, Plasma [[id number]]  (Abnormal) Collected: 12/18/24 0102    Specimen: Blood Updated: 12/18/24 0135     Lactate 2.7 mmol/L     COVID PRE-OP / PRE-PROCEDURE SCREENING ORDER (NO ISOLATION) - Swab, Nasal Cavity [[id number]]  (Normal) Collected: 12/18/24 0102    Specimen: Swab from Nasal Cavity Updated: 12/18/24 0132    Narrative:      The following orders were created for panel order COVID PRE-OP / PRE-PROCEDURE SCREENING ORDER (NO ISOLATION) - Swab, Nasal Cavity.  Procedure                               Abnormality         Status                     ---------                               -----------         ------                     COVID-19 and FLU A/B PCR...[[id number]]  Normal              Final result                 Please view results for these tests on the individual orders.    COVID-19 and FLU A/B PCR, 1 HR TAT - Swab, Nasopharynx [[id number]]  (Normal) Collected:  12/18/24 0102    Specimen: Swab from Nasopharynx Updated: 12/18/24 0132     COVID19 Not Detected     Influenza A PCR Not Detected     Influenza B PCR Not Detected    Narrative:      Fact sheet for providers: https://www.fda.gov/media/219665/download    Fact sheet for patients: https://www.fda.gov/media/817752/download    Test performed by PCR.    Comprehensive Metabolic Panel [[id number]]  (Abnormal) Collected: 12/18/24 0102    Specimen: Blood Updated: 12/18/24 0127     Glucose 119 mg/dL      BUN 53 mg/dL      Creatinine 2.95 mg/dL      Sodium 138 mmol/L      Potassium 3.4 mmol/L      Comment: Slight hemolysis detected by analyzer. Result may be falsely elevated.        Chloride 103 mmol/L      CO2 15.0 mmol/L      Calcium 9.4 mg/dL      Total Protein 7.8 g/dL      Albumin 3.8 g/dL      ALT (SGPT) 5 U/L      AST (SGOT) 14 U/L      Alkaline Phosphatase 89 U/L      Total Bilirubin 0.2 mg/dL      Globulin 4.0 gm/dL      A/G Ratio 1.0 g/dL      BUN/Creatinine Ratio 18.0     Anion Gap 20.0 mmol/L      eGFR 15.4 mL/min/1.73     Narrative:      GFR Categories in Chronic Kidney Disease (CKD)      GFR Category          GFR (mL/min/1.73)    Interpretation  G1                     90 or greater         Normal or high (1)  G2                      60-89                Mild decrease (1)  G3a                   45-59                Mild to moderate decrease  G3b                   30-44                Moderate to severe decrease  G4                    15-29                Severe decrease  G5                    14 or less           Kidney failure          (1)In the absence of evidence of kidney disease, neither GFR category G1 or G2 fulfill the criteria for CKD.    eGFR calculation 2021 CKD-EPI creatinine equation, which does not include race as a factor    Magnesium [[id number]]  (Normal) Collected: 12/18/24 0102    Specimen: Blood Updated: 12/18/24 0127     Magnesium 2.0 mg/dL     Lipase [[id number]]  (Normal) Collected: 12/18/24 0102     Specimen: Blood Updated: 12/18/24 0123     Lipase 20 U/L     Protime-INR [[id number]]  (Abnormal) Collected: 12/18/24 0102    Specimen: Blood Updated: 12/18/24 0121     Protime 26.8 Seconds      INR 2.36    aPTT [[id number]]  (Abnormal) Collected: 12/18/24 0102    Specimen: Blood Updated: 12/18/24 0121     PTT 38.9 seconds     CBC & Differential [[id number]]  (Abnormal) Collected: 12/18/24 0102    Specimen: Blood Updated: 12/18/24 0111    Narrative:      The following orders were created for panel order CBC & Differential.  Procedure                               Abnormality         Status                     ---------                               -----------         ------                     CBC Auto Differential[[id number]]        Abnormal            Final result                 Please view results for these tests on the individual orders.    CBC Auto Differential [[id number]]  (Abnormal) Collected: 12/18/24 0102    Specimen: Blood Updated: 12/18/24 0111     WBC 9.85 10*3/mm3      RBC 3.57 10*6/mm3      Hemoglobin 10.4 g/dL      Hematocrit 32.6 %      MCV 91.3 fL      MCH 29.1 pg      MCHC 31.9 g/dL      RDW 15.5 %      RDW-SD 50.9 fl      MPV 10.9 fL      Platelets 260 10*3/mm3      Neutrophil % 73.8 %      Lymphocyte % 19.1 %      Monocyte % 6.1 %      Eosinophil % 0.3 %      Basophil % 0.3 %      Immature Grans % 0.4 %      Neutrophils, Absolute 7.27 10*3/mm3      Lymphocytes, Absolute 1.88 10*3/mm3      Monocytes, Absolute 0.60 10*3/mm3      Eosinophils, Absolute 0.03 10*3/mm3      Basophils, Absolute 0.03 10*3/mm3      Immature Grans, Absolute 0.04 10*3/mm3      nRBC 0.0 /100 WBC           Imaging Results (Last 24 Hours)       Procedure Component Value Units Date/Time    XR Chest 1 View [[id number]] Resulted: 12/18/24 0138     Updated: 12/18/24 0202    CT Head Without Contrast [[id number]] Resulted: 12/18/24 0102     Updated: 12/18/24 0130    CT Cervical Spine Without Contrast [[id number]] Resulted: 12/18/24  0102     Updated: 12/18/24 0130          I have personally reviewed and interpreted the radiology studies and ECG obtained at time of admission.     Assessment / Plan   Assessment:   Active Hospital Problems    Diagnosis     **Altered mental status        Treatment Plan  The patient will be admitted to my service here at [facility].  1.  Metabolic encephalopathy  2.  Acute kidney injury  3.  Acute cystitis without hematuria  4.  History of depression  5.  History of hypertension  6.  History of hyperlipidemia  7.  History of obstructive sleep apnea  8.  Status post fall      The patient will be admitted under my service, she will be started on IV fluids, IV ceftriaxone, will avoid nephrotoxic agents.  Will follow-up on imaging studies obtained in the ER.  Resume home medications.    Medical Decision Making  Number and Complexity of problems: 8  Differential Diagnosis: Fall    Conditions and Status        Condition is unchanged.     [facility] Data  External documents reviewed: N/A  Cardiac tracing (EKG, telemetry) interpretation: Sinus  Radiology interpretation: Reviewed  Labs reviewed: Yes  Any tests that were considered but not ordered: N/A     Decision rules/scores evaluated (example [id number], Wells, etc): N/A     Discussed with: Nursing staff     Care Planning  Shared decision making: Patient  Code status and discussions: Full    Disposition  Social Determinants of Health that impact treatment or disposition: None  Estimated length of stay is 3 days.     I confirmed that the patient's advanced care plan is present, code status is documented, and a surrogate decision maker is listed in the patient's medical record.     The patient's surrogate decision maker is patient.     The patient was seen and examined by me on 12/18/2024 at 0250.    Electronically signed by Antonino Braden MD, 12/18/24, 03:08 CST.              Electronically signed by Antonino Braden MD at 12/18/24 0317          Physician  "Progress Notes (last 24 hours)        Dheeraj Kent MD at 12/23/24 1036          Patient Name: Jennifer Gomes  Date of Admission: 12/18/2024  Today's Date: 12/23/24  Length of Stay: 5  Primary Care Physician: Moiz Schwartz DO    Subjective   Chief Complaint: Altered mental status  Fall    Altered Mental Status     12/.22   Patient lying in bed, awake and alert.  Patient agreeable to [facility] however states it we will have to wait until, \"there is somebody investigating something\".  I did discuss ongoing PT/OT.  She states, \"I just want to Kayla here and not get up\".  She also states, \"as a [id number] I would just as soon close my eyes and never wake up\".  She denies suicidal thoughts.  She states she is just ready to go.  Patient is complaining of pain in the buttocks region from skin breakdown.  Condition is stable.  12/23  Feeling better UC echoli metabolic encephalopathy , SNF Pending lab protective services involved polypharmacy urine drug screen positive for benzo and oxy started on Rocephin E. coli sensitive to Rocephin    Documented weights    12/18/24 0042 12/18/24 0434   Weight: 74.8 kg (165 lb) 65.5 kg (144 lb 6.4 oz)          Intake/Output Summary (Last 24 hours) at 12/23/2024 1036  Last data filed at 12/22/2024 1700  Gross per 24 hour   Intake 240 ml   Output --   Net 240 ml       Results for orders placed during the hospital encounter of 11/12/24    Adult Transthoracic Echo Complete W/ Cont if Necessary Per Protocol    Interpretation Summary    Left ventricular ejection fraction appears to be 66 - 70%.    Left ventricular wall thickness is consistent with mild concentric hypertrophy.    Left ventricular diastolic function is consistent with (grade Ia w/high LAP) impaired relaxation.    Left atrial volume is moderately increased.    Mild pulmonary hypertension is present.       Review of Systems   All pertinent negatives and positives are as above. All other systems have been reviewed and are " negative unless otherwise stated.     Objective    Temp:  [97.7 °F (36.5 °C)-98.3 °F (36.8 °C)] 98.3 °F (36.8 °C)  Heart Rate:  [66-81] 78  Resp:  [16-18] 18  BP: (140-151)/(48-69) 151/60  Physical Exam  Constitutional:       Appearance: Normal appearance. She is ill-appearing.   HENT:      Head: Normocephalic and atraumatic.      Mouth/Throat:      Mouth: Mucous membranes are moist.      Pharynx: Oropharynx is clear.   Eyes:      Extraocular Movements: Extraocular movements intact.      Conjunctiva/sclera: Conjunctivae normal.   Cardiovascular:      Rate and Rhythm: Normal rate and regular rhythm.      Pulses: Normal pulses.      Heart sounds: Normal heart sounds.   Pulmonary:      Effort: Pulmonary effort is normal.      Breath sounds: Normal breath sounds.   Abdominal:      General: There is no distension.      Palpations: Abdomen is soft.      Tenderness: There is no abdominal tenderness.   Musculoskeletal:      Cervical back: Normal range of motion and neck supple.      Right lower leg: No edema.      Left lower leg: No edema.      Comments: Generalized weakness   Skin:     General: Skin is warm and dry.      Findings: Bruising, erythema (Diffuse excoriation on buttocks and IFRAH area slowly improving) and rash (Under right breast) present.      Comments: Bilateral heel protectors in place  Pictures in chart for additional wounds   Neurological:      General: No focal deficit present.      Mental Status: She is oriented to person, place, and time.   Psychiatric:         Attention and Perception: Attention normal.         Mood and Affect: Affect is flat.         Speech: Speech normal.         Behavior: Behavior normal.         Cognition and Memory: Memory is impaired.       Results Review:  I have reviewed the labs, radiology results, and diagnostic studies.    Laboratory Data:   Results from last 7 days   Lab Units 12/21/24  0959 12/20/24  0411 12/19/24  1214 12/19/24  0311 12/18/24  0102   WBC 10*3/mm3  --   3.93  --  5.01 9.85   HEMOGLOBIN g/dL 8.7* 7.7* 7.6* 7.5* 10.4*   HEMATOCRIT % 27.2* 24.4* 23.9* 24.1* 32.6*   PLATELETS 10*3/mm3  --  118*  --  156 260        Results from last 7 days   Lab Units 12/21/24  0959 12/20/24  0411 12/19/24  1214 12/18/24  1242 12/18/24  0102   SODIUM mmol/L 141 145 144   < > 138   POTASSIUM mmol/L 3.4* 4.8 3.2*   < > 3.4*   CHLORIDE mmol/L 115* 122* 118*   < > 103   CO2 mmol/L 18.0* 15.0* 15.0*   < > 15.0*   BUN mg/dL 17 27* 35*   < > 53*   CREATININE mg/dL 1.34* 1.67* 2.05*   < > 2.95*   CALCIUM mg/dL 9.0 8.3* 8.4*   < > 9.4   BILIRUBIN mg/dL  --   --   --   --  0.2   ALK PHOS U/L  --   --   --   --  89   ALT (SGPT) U/L  --   --   --   --  5   AST (SGOT) U/L  --   --   --   --  14   GLUCOSE mg/dL 126* 82 106*   < > 119*    < > = values in this interval not displayed.       Culture Data:     Microbiology Results (last 10 days)       Procedure Component Value - Date/Time    Blood Culture - Blood, Arm, Right [[id number]]  (Normal) Collected: 12/18/24 0110    Lab Status: Final result Specimen: Blood from Arm, Right Updated: 12/23/24 0215     Blood Culture No growth at 5 days    COVID PRE-OP / PRE-PROCEDURE SCREENING ORDER (NO ISOLATION) - Swab, Nasal Cavity [[id number]]  (Normal) Collected: 12/18/24 0102    Lab Status: Final result Specimen: Swab from Nasal Cavity Updated: 12/18/24 0132    Narrative:      The following orders were created for panel order COVID PRE-OP / PRE-PROCEDURE SCREENING ORDER (NO ISOLATION) - Swab, Nasal Cavity.  Procedure                               Abnormality         Status                     ---------                               -----------         ------                     COVID-19 and FLU A/B PCR...[[id number]]  Normal              Final result                 Please view results for these tests on the individual orders.    COVID-19 and FLU A/B PCR, 1 HR TAT - Swab, Nasopharynx [[id number]]  (Normal) Collected: 12/18/24 0102    Lab Status: Final result  Specimen: Swab from Nasopharynx Updated: 12/18/24 0132     COVID19 Not Detected     Influenza A PCR Not Detected     Influenza B PCR Not Detected    Narrative:      Fact sheet for providers: https://www.fda.gov/media/881747/download    Fact sheet for patients: https://www.fda.gov/media/113805/download    Test performed by PCR.    Urine Culture - Urine, Straight Cath [[id number]]  (Abnormal)  (Susceptibility) Collected: 12/18/24 0102    Lab Status: Final result Specimen: Urine from Straight Cath Updated: 12/20/24 0943     Urine Culture >100,000 CFU/mL Escherichia coli    Narrative:      Colonization of the urinary tract without infection is common. Treatment is discouraged unless the patient is symptomatic, pregnant, or undergoing an invasive urologic procedure.    Susceptibility        Escherichia coli      CHULA      Amoxicillin + Clavulanate Susceptible      Ampicillin Susceptible      Ampicillin + Sulbactam Susceptible      Cefazolin (Urine) Susceptible      Cefepime Susceptible      Ceftazidime Susceptible      Ceftriaxone Susceptible      Gentamicin Susceptible      Levofloxacin Susceptible      Nitrofurantoin Susceptible      Piperacillin + Tazobactam Susceptible      Trimethoprim + Sulfamethoxazole Susceptible                           Blood Culture - Blood, Arm, Right [[id number]]  (Normal) Collected: 12/18/24 0102    Lab Status: Final result Specimen: Blood from Arm, Right Updated: 12/23/24 0215     Blood Culture No growth at 5 days             Radiology Data:   Imaging Results (Last 7 Days)       Procedure Component Value Units Date/Time    MRI Brain Without Contrast [[id number]] Collected: 12/19/24 1658     Updated: 12/19/24 1704    Narrative:      MRI BRAIN WO CONTRAST- 12/19/2024 3:31 PM     HISTORY: stroke like symptoms; N17.9-Acute kidney failure, unspecified;  N39.0-Urinary tract infection, site not specified; A41.9-Sepsis,  unspecified organism; L89.90-Pressure ulcer of unspecified site,  unspecified  stage; B37.2-Candidiasis of skin and nail; R41.82-Altered  mental status, unspecified; R13.10-Dysphagia, unspecified; Z74.09-Other  reduced mobility     TECHNIQUE: Multisequence, multiplanar MRI of the brain without contrast     COMPARISON: None     FINDINGS:     No diffusion signal abnormality. No intra-axial or extra-axial  hemorrhage. No intracranial mass lesion or mass effect. Mild chronic  small vessel ischemic changes. Ventricles are nondilated. Posterior  fossa structures are unremarkable. Pituitary gland and sella are  unremarkable. The major intracranial flow-voids are preserved. Orbital  contents are unremarkable. Chronic right maxillary sinus disease.  Partial right mastoid effusion. Normal marrow signal in the skull base  and calvarium.       Impression:         1.  No acute intracranial process. In particular, there is no acute  ischemia.  2.  Chronic small vessel ischemic changes.  3.  Chronic paranasal sinus disease.  4.  Partial right mastoid effusion.     This report was signed and finalized on 12/19/2024 5:01 PM by Dr Tenzin Madrigal.       US Renal Bilateral [[id number]] Collected: 12/18/24 1223     Updated: 12/18/24 1316    Addenda:        ADDENDUM: It is noted that the technologist scanned the kidneys outside  of our normal protocol scanning the left kidney first followed by the  right kidney. Therefore, the measurements of the kidneys are reversed.  The left kidney measures 10.0 cm in rkdn-bo-qmxf length and the right  kidney 8.9 cm in gibr-vk-nmyf length. Otherwise no change from the  previous exam.     This report was signed and finalized on 12/18/2024 1:13 PM by Dr. Chi Hinds MD.     Signed: 12/18/24 1313 by Chi Hinds MD    Narrative:      RENAL ULTRASOUND COMPLETE 12/18/2024 10:57 AM     REASON FOR EXAM: prior hydronephrosis, recurrent TOMÁS; N17.9-Acute kidney  failure, unspecified; N39.0-Urinary tract infection, site not specified;  A41.9-Sepsis, unspecified organism;  L89.90-Pressure ulcer of unspecified  site, unspecified stage; B37.2-Candidiasis of skin and nail;  R41.82-Altered mental status, unspecified; R13.10-Dysphagia, unspecified        COMPARISON: 11/15/2024     TECHNIQUE: Multiple longitudinal and transverse realtime sonographic  images of the kidneys and urinary bladder are obtained.  Images and  report are stored in PACS per institutional and state regulations.     FINDINGS:     RIGHT KIDNEY: 10.0 cm. Normal in size, shape, contour and position. No  solid or cystic masses. The central echo complex is compact with no  evidence for hydronephrosis. No nephrolithiasis or abnormal perinephric  fluid collections . No hydroureter.     LEFT KIDNEY: 8.9 cm. Normal in size, shape, contour and position. No  solid or cystic masses. The central echo complex is compact with no  evidence for hydronephrosis. No nephrolithiasis or abnormal perinephric  fluid collections . No hydroureter.     PELVIS: There is some debris within the bladder but with no discrete  mass. The urinary bladder is otherwise unremarkable. There is no  surrounding ascites.       Impression:      1. Debris within the bladder without evidence of discrete bladder mass  or bladder wall thickening.  2. Normal sonogram of the kidneys. No mass or hydronephrosis..           This report was signed and finalized on 12/18/2024 12:25 PM by Dr. Chi Hinds MD.       CT Cervical Spine Without Contrast [[id number]] Collected: 12/18/24 0609     Updated: 12/18/24 0718    Narrative:      EXAMINATION: CT CERVICAL SPINE WO CONTRAST-      12/18/2024 12:01 AM     HISTORY: fall; N17.9-Acute kidney failure, unspecified; N39.0-Urinary  tract infection, site not specified; A41.9-Sepsis, unspecified organism;  L89.90-Pressure ulcer of unspecified site, unspecified stage;  B37.2-Candidiasis of skin and nail; R41.82-Altered mental status,  unspecified     In order to have a CT radiation dose as low as reasonably achievable  Automated  Exposure Control was utilized for adjustment of the mA and/or  KV according to patient size.     Total DLP = 331.85 mGy.cm     The CT scan of the cervical spine is performed with delved intravenous  or intrathecal contrast enhancement.     Images are acquired in axial plane and subsequent reconstruction in  coronal and sagittal planes.     Comparison is made with the previous study dated 9/4/2023.     There is no evidence of or compression.     No acute displacement or malalignment.     There is mild retrolisthesis of C5 over C6.     The curvature is maintained.     There is severe arthropathy of the atlantodental articulation with  complete obliteration of the atlantodental space.     There are chronic degenerative changes in the form of anterior and  posterior osteophytes, uncinate spurs and facet arthropathy. There is a  resultant mild narrowing of the left neuroforamina C2-3 and C3-4,  bilateral neuroforaminal stenosis at C4-5 C5-6 and C6-7. There is mild  spinal stenosis at C5-6.     Limited visualized prevertebral soft tissues are unremarkable. Thyroid  gland is incompletely visualized and not well evaluated.     Limited visualized lung apices are unremarkable.       Impression:      1. No acute fracture or malalignment.  2. Cervical spondylosis. Mild retrolisthesis C5 over C6.  3. Multilevel prominent disc osteophyte complexes, uncinate spurs and  facet arthropathy and resultant neural foraminal and spinal canal  stenoses are detailed above.     The above study was initially reviewed and reported by [facility]. I do not  find any discrepancies.                                            This report was signed and finalized on 12/18/2024 7:14 AM by Dr. Marty Suresh MD.       XR Chest 1 View [[id number]] Collected: 12/18/24 0649     Updated: 12/18/24 0653    Narrative:      EXAMINATION: XR CHEST 1 VW-     12/18/2024 12:35 AM     HISTORY: altered mentation; N17.9-Acute kidney failure,  unspecified;  N39.0-Urinary tract infection, site not specified; A41.9-Sepsis,  unspecified organism; L89.90-Pressure ulcer of unspecified site,  unspecified stage; B37.2-Candidiasis of skin and nail; R41.82-Altered  mental status, unspecified     A frontal projection of the chest is compared with the previous study  dated 10/5/2024.     The lungs are moderately expanded, significantly improved since the  previous study.     The right lung apex is obscured by the bony and soft tissue structures  of the mandible.     There is no evidence of recent infiltrate, pleural effusion, pulmonary  congestion or pneumothorax.     Heart size is in the normal range. Atheromatous changes of the tortuous  thoracic aorta is noted.     There is deformity of the right distal clavicle representing previous  healed fracture. There is no acute bony abnormality.       Impression:      1. No active cardiopulmonary disease.        This report was signed and finalized on 12/18/2024 6:50 AM by Dr. Marty Suresh MD.       CT Head Without Contrast [[id number]] Collected: 12/18/24 0603     Updated: 12/18/24 0611    Narrative:      EXAMINATION: CT HEAD WO CONTRAST-      12/18/2024 12:01 AM     HISTORY: altered mentation/fall; N17.9-Acute kidney failure,  unspecified; N39.0-Urinary tract infection, site not specified;  A41.9-Sepsis, unspecified organism; L89.90-Pressure ulcer of unspecified  site, unspecified stage; B37.2-Candidiasis of skin and nail;  R41.82-Altered mental status, unspecified     In order to have a CT radiation dose as low as reasonably achievable  Automated Exposure Control was utilized for adjustment of the mA and/or  KV according to patient size.     Total DLP = 852.88 mGy.cm     The CT scan of the head is performed without intravenous contrast  enhancement.     The images are acquired in axial plane and subsequent reconstruction in  coronal and sagittal planes.     Comparison is made with the previous study dated  10/5/2024.     There are significant motion artifacts which lowers the diagnostic  quality of the study. A subtle lesion may not be evaluated or excluded.     There is no evidence of a mass. There is no midline shift.     There is no evidence of intracranial hemorrhage or hematoma.     There is moderate dilatation of the ventricles, basal cisterns and the  cortical sulci suggestive chronic volume loss, similar to the previous  study.     Scattered areas of chronic white matter ischemia bilaterally noted. The  gray-white matter differentiation is maintained.     Very limited visualized posterior fossa structures are grossly  unremarkable.     Images reviewed in bone window show no acute displaced or depressed  skull fracture. A subtle nondisplaced fracture may be obscured due to  significant motion artifact. There is opacification of the right  maxillary antrum similar to the previous study representing chronic  inflammatory process. Mastoid air cells are clear.       Impression:      1. Significant motion artifacts may obscure a subtle intracranial  abnormality or lesion. No obvious/visible acute intracranial  abnormality. If clinically warranted further evaluation with repeat CT  scan of the head or MR imaging of the brain when the patient is able to  cooperate.  2. Chronic ischemic and atrophic changes.     The above study was initially reviewed and reported by [facility]. I do not  find any discrepancies.                                            This report was signed and finalized on 12/18/2024 6:08 AM by Dr. Marty Suresh MD.                I have reviewed the patient's current medications.     atorvastatin, 40 mg, Oral, Daily  carvedilol, 3.125 mg, Oral, BID With Meals  donepezil, 10 mg, Oral, Nightly  hydrocortisone-bacitracin-zinc oxide-nystatin, 1 Application, Topical, 4x Daily  levothyroxine, 50 mcg, Oral, Q AM  pantoprazole, 40 mg, Oral, Q AM  QUEtiapine, 25 mg, Oral, Nightly  sertraline, 25 mg, Oral,  Daily  sodium chloride, 10 mL, Intravenous, Q12H            Assessment/Plan   Assessment  Active Hospital Problems    Diagnosis     **Metabolic encephalopathy     Acute cystitis     Hypokalemia     Essential hypertension     Acute renal failure superimposed on stage 3 chronic kidney disease        Treatment Plan  1.  Metabolic encephalopathy-resolved, fall precautions, PT/OT working with patient, patient is reluctant to process the pain  2.  Acute cystitis, urine cultures-E. coli , completed Rocephin 3-day therapy  3. Essential hypertension-resolved, continue current antihypertensives, continuous cardiac monitoring, vital signs every 4 hours  4.  Acute renal failure superimposed on stage III CKD, normal saline 50/hr x 20 hours, labs in a.m (refused today 12/22).  5.  Hypokalemia, replace with 40 mEq x 2 today and start 20 mEq daily in a.m., labs in a.m.  6.  Home medications reviewed and restarted as appropriate, DVT prophylaxis with SCDs, continue to work closely with PT/OT, patient being followed by [facility] rehab bed available when investigation complete, wound care following-see orders    Medical Decision Making  Metabolic encephalopathy, acute, high complexity, resolved  Acute cystitis, acute acute, high complexity, resolved  Hypokalemia, acute, moderate complexity, ongoing/unchanged  Acute renal failure superimposed on stage III chronic kidney disease, acute, high complexity, improving  Number and Complexity of problems: 5  Differential Diagnosis: None    Conditions and Status        Condition is unchanged.     [facility] Data  External documents reviewed: None  Cardiac tracing (EKG, telemetry) interpretation: Telemetry normal sinus rhythm 60-79  Radiology interpretation: Reviewed  Labs reviewed: Yes  Any tests that were considered but not ordered: None     Decision rules/scores evaluated (example [id number], [place], etc): None     Discussed with: Patient, daughter, and Dr. Omkar Coyne  Planning  Shared decision making: Patient, daughter, and Dr. Espitia  Code status and discussions: Full  Surrogate Decision Maker: Ivonne Liang, daughter    Disposition  Social Determinants of Health that impact treatment or disposition: [facility] at discharge  I expect the patient to be discharged to [facility] in 1 to 2  days.     Electronically signed by Dheeraj Kent MD-BC, 24, 10:36 CST.     Electronically signed by Dheeraj Kent MD at 24 1223          Physical Therapy Notes (last 72 hours)        Daniella Samuels PTA at 24 1206  Version 1 of 1         Goal Outcome Evaluation:      Patient agreed to sit EOB. CGA for supine to sit. Patient has good sitting balance. Patient actively worked thru LE exercises. Patient was able to reach in all directions, just very limited trunk movement. Patient tolerated sitting EOB for 20 minutes. Min assist for sit to supine.   Patient is very weak. Patient will benefit from rehab prior to D/C home.                                      Electronically signed by Daniella Samuels PTA at 24 1208       Daniella Samuels PTA at 24 1210  Version 1 of 1         Acute Care - Physical Therapy Treatment Note  [facility]     Patient Name: Jennifer Gomes  : 1940  MRN: [id number]  Today's Date: 2024      Visit Dx:     ICD-10-CM ICD-9-CM   1. TOMÁS (acute kidney injury)  N17.9 584.9   2. Acute UTI  N39.0 599.0   3. Sepsis, due to unspecified organism, unspecified whether acute organ dysfunction present  A41.9 038.9     995.91   4. Pressure injury of skin, unspecified injury stage, unspecified location  L89.90 707.00     707.20   5. Candidal intertrigo  B37.2 112.3   6. Altered mental status, unspecified altered mental status type  R41.82 780.97   7. Dysphagia, unspecified type [R13.10]  R13.10 787.20   8. Impaired mobility [Z74.09]  Z74.09 799.89     Patient Active Problem List   Diagnosis    Gastroesophageal reflux disease    Spinal stenosis,  lumbar region, without neurogenic claudication    Erosion of vaginal mesh    Adenocarcinoma of endometrium    S/P hysterectomy with oophorectomy    Encounter for follow-up surveillance of endometrial cancer    History of radiation therapy    Non-traumatic rhabdomyolysis    Metabolic encephalopathy    Acute renal failure superimposed on stage 3 chronic kidney disease    Medical non-compliance, does not take narcotics as prescribed    Chronic constipation    Chronic pain syndrome    Chronic prescription opiate use    Anemia    Hypothyroidism (acquired)    Essential hypertension    Venous insufficiency of both lower extremities    Chronic intractable headache    RAFAL (obstructive sleep apnea)    Acute encephalopathy due to UTI    Toxic metabolic encephalopathy    Polypharmacy    Frequent falls    Grade III internal hemorrhoids    External hemorrhoids    Colitis    Moderate malnutrition    Transaminitis    Acute UTI (urinary tract infection)    Polypharmacy    UTI (urinary tract infection)    Dementia    Hypokalemia    Sepsis due to urinary tract infection    Cardiopulmonary arrest    E coli bacteremia    Anemia requiring transfusions    Bilateral hydronephrosis    Acute urinary retention    TOMÁS (acute kidney injury)    Acute cystitis    Hypercalcemia    Paroxysmal atrial fibrillation     Past Medical History:   Diagnosis Date    Anxiety     Arthritis     Bronchitis     Cancer     uterine    Chronic pain     Depression     Disease of thyroid gland     Fibromyalgia     GERD (gastroesophageal reflux disease)     Headache     History of transfusion     AS     Hyperlipidemia     Hypertension     Incontinence     Insomnia     Leg pain     Lumbar stenosis     Migraines     Peptic ulcer     Restless legs     Sleep apnea     NO C-PAP    UTI (urinary tract infection)     Vaginal bleeding      Past Surgical History:   Procedure Laterality Date    BLADDER REPAIR      MESH HAD TO BE REMOVED IN 2013    BREAST BIOPSY Right  2017    benign    BREAST CYST EXCISION Left 1982    CARDIAC CATHETERIZATION      CARPAL TUNNEL RELEASE      CATARACT EXTRACTION W/ INTRAOCULAR LENS  IMPLANT, BILATERAL      CHOLECYSTECTOMY WITH INTRAOPERATIVE CHOLANGIOGRAM N/A 9/7/2023    Procedure: CHOLECYSTECTOMY LAPAROSCOPIC INTRAOPERATIVE CHOLANGIOGRAM;  Surgeon: Gerardo Arciniega MD;  Location: [facility] OR;  Service: General;  Laterality: N/A;    COLONOSCOPY      COLONOSCOPY N/A 10/01/2021    Procedure: COLONOSCOPY WITH ANESTHESIA;  Surgeon: Tom Velasco DO;  Location: [facility] ENDOSCOPY;  Service: Gastroenterology;  Laterality: N/A;  pre: change in bowel habits  post: diverticulosis. hemorrhoids.   Olivia Mora APRN        CYSTECTOMY      D & C HYSTEROSCOPY N/A 11/06/2017    Procedure: DILATATION AND CURETTAGE HYSTEROSCOPY;  Surgeon: Shasta Madrigal MD;  Location: [facility] OR;  Service:     DILATION AND CURETTAGE, DIAGNOSTIC / THERAPEUTIC  2008    ENDOSCOPY  09/23/2010    Short segment of Arriola's,Moderate chroninc esophagogastritis and negative H.pylori    ENDOSCOPY N/A 09/25/2017    Procedure: ESOPHAGOGASTRODUODENOSCOPY WITH ANESTHESIA;  Surgeon: Tom Velasco DO;  Location: [facility] ENDOSCOPY;  Service:     EYE SURGERY      RETINA    HEMORRHOIDECTOMY SIGMOIDOSCOPY N/A 3/21/2023    Procedure: HEMORRHOIDECTOMY WITH EXAM UNDER ANESTHESIA;  Surgeon: Holly Chavez MD;  Location: [facility] OR;  Service: General;  Laterality: N/A;    HYSTERECTOMY  12/20/2017    ORIF TIBIA/FIBULA FRACTURES Left 2000    TRANSVAGINAL TAPING SUSPENSION N/A 11/06/2017    Procedure: VAGINAL MESH REVISION;  Surgeon: Shasta Madrigal MD;  Location: [facility] OR;  Service:     VAGINAL MESH REVISION  2013     PT Assessment (Last 12 Hours)       PT Evaluation and Treatment       Row Name 12/23/24 1007          Physical Therapy Time and Intention    Subjective Information no complaints  -CAITLYN     Document Type therapy note (daily note)  -CAITLYN     Mode of Treatment physical therapy  -CAITLYN        Row Name 12/23/24 1007          General Information    Existing Precautions/Restrictions fall  -       Row Name 12/23/24 1007          Bed Mobility    Supine-Sit [place] (Bed Mobility) verbal cues;contact guard  -     Sit-Supine [place] (Bed Mobility) verbal cues;minimum assist (75% patient effort)  -     Assistive Device (Bed Mobility) bed rails  -       Row Name 12/23/24 1007          Balance    Static Sitting Balance standby assist  -     Position, Sitting Balance unsupported;sitting edge of bed  -     Balance Interventions --  able to reach all directions but limited movement of trunk. Very weak trunk stabs.  -     Comment, Balance sat EOb 20 minutes  -       Row Name 12/23/24 1007          Hip (Therapeutic Exercise)    Hip AROM (Therapeutic Exercise) bilateral;flexion;aBduction;aDduction;sitting;15 repititions  -       Row Name 12/23/24 1007          Knee (Therapeutic Exercise)    Knee AROM (Therapeutic Exercise) bilateral;LAQ (long arc quad);sitting;20 repititions  -       Row Name 12/23/24 1007          Ankle (Therapeutic Exercise)    Ankle AROM (Therapeutic Exercise) bilateral;dorsiflexion;plantarflexion;sitting;20 repititions  -       Row Name             Wound 12/18/24 0054 Bilateral coccyx    Wound - Properties Group Placement Date: 12/18/24  -SM Placement Time: 0054  -SM Side: Bilateral  -SM Location: coccyx  -SM    Retired Wound - Properties Group Placement Date: 12/18/24  -SM Placement Time: 0054  -SM Side: Bilateral  -SM Location: coccyx  -SM    Retired Wound - Properties Group Placement Date: 12/18/24  -SM Placement Time: 0054  -SM Side: Bilateral  -SM Location: coccyx  -SM    Retired Wound - Properties Group Date first assessed: 12/18/24  -SM Time first assessed: 0054  -SM Side: Bilateral  -SM Location: coccyx  -SM      Row Name             Wound 12/18/24 0055 Left anterior greater trochanter    Wound - Properties Group Placement Date: 12/18/24  -SM Placement  Time: 0055  -SM Side: Left  -SM Orientation: anterior  -SM Location: greater trochanter  -SM    Retired Wound - Properties Group Placement Date: 12/18/24  -SM Placement Time: 0055  -SM Side: Left  -SM Orientation: anterior  -SM Location: greater trochanter  -SM    Retired Wound - Properties Group Placement Date: 12/18/24  -SM Placement Time: 0055  -SM Side: Left  -SM Orientation: anterior  -SM Location: greater trochanter  -SM    Retired Wound - Properties Group Date first assessed: 12/18/24  -SM Time first assessed: 0055  -SM Side: Left  -SM Location: greater trochanter  -SM      Row Name             Wound 12/18/24 0107 nose    Wound - Properties Group Placement Date: 12/18/24  -SM Placement Time: 0107  -SM Location: nose  -SM    Retired Wound - Properties Group Placement Date: 12/18/24  -SM Placement Time: 0107  -SM Location: nose  -SM    Retired Wound - Properties Group Placement Date: 12/18/24  -SM Placement Time: 0107  -SM Location: nose  -SM    Retired Wound - Properties Group Date first assessed: 12/18/24  -SM Time first assessed: 0107  -SM Location: nose  -SM      Row Name             Wound 12/18/24 0151 Left breast    Wound - Properties Group Placement Date: 12/18/24  -SM Placement Time: 0151  -SM Side: Left  -SM Orientation: --  -SM Location: breast  -SM    Retired Wound - Properties Group Placement Date: 12/18/24  -SM Placement Time: 0151  -SM Side: Left  -SM Orientation: --  -SM Location: breast  -SM    Retired Wound - Properties Group Placement Date: 12/18/24  -SM Placement Time: 0151  -SM Side: Left  -SM Orientation: --  -SM Location: breast  -SM    Retired Wound - Properties Group Date first assessed: 12/18/24  -SM Time first assessed: 0151  -SM Side: Left  -SM Location: breast  -SM      Row Name 12/23/24 1007          Positioning and Restraints    Pre-Treatment Position in bed  -CAITLYN     Post Treatment Position bed  -CAITLYN     In Bed side lying left;notified nsg;call light within reach;encouraged to call  for assist;exit alarm on  -CAITLYN               User Key  (r) = Recorded By, (t) = Taken By, (c) = Cosigned By      Initials Name Provider Type    CAITLYN Daniella Samuels, CHANDLER Physical Therapist Assistant    Lupe Love, RN Registered Nurse                    Physical Therapy Education       Title: PT OT SLP Therapies (In Progress)       Topic: Physical Therapy (In Progress)       Point: Mobility training (Done)       Learning Progress Summary            Patient Acceptance, E,TB, VU by ESTER at 12/21/2024 0334    Acceptance, E, DU by CAITLYN at 12/20/2024 1140    Comment: bed mobility.    Nonacceptance, E, NR,NL,RT by MATTY at 12/19/2024 0936    Comment: benefits of activity, progression of PT                      Point: Home exercise program (Not Started)       Learner Progress:  Not documented in this visit.              Point: Body mechanics (Not Started)       Learner Progress:  Not documented in this visit.              Point: Precautions (Not Started)       Learner Progress:  Not documented in this visit.                              User Key       Initials Effective Dates Name Provider Type Discipline    MATTY 02/03/23 -  Arturo Walters, PT DPT Physical Therapist PT    CAITLYN 02/03/23 -  Daniella Samuels PTA Physical Therapist Assistant PT     11/20/23 -  Orin Peralta, ELBA Registered Nurse Nurse                  PT Recommendation and Plan         Outcome Measures       Row Name 12/23/24 1100             How much help from another person do you currently need...    Turning from your back to your side while in flat bed without using bedrails? 4  -CAITLYN      Moving from lying on back to sitting on the side of a flat bed without bedrails? 4  -CAITLYN      Moving to and from a bed to a chair (including a wheelchair)? 3  -CAITLYN      Standing up from a chair using your arms (e.g., wheelchair, bedside chair)? 3  -CAITLYN      Climbing 3-5 steps with a railing? 2  -CAITLYN      To walk in hospital room? 1  -CAITLYN      AM-PAC 6 Clicks Score (PT) 17   -CAITLYN                User Key  (r) = Recorded By, (t) = Taken By, (c) = Cosigned By      Initials Name Provider Type    Daniella Carter PTA Physical Therapist Assistant                     Time Calculation:    PT Charges       Row Name 24 1210             Time Calculation    Start Time 1007  -CAITLYN      Stop Time 1030  -CAITLYN      Time Calculation (min) 23 min  -CAITLYN         Timed Charges    75173 - PT Therapeutic Activity Minutes 23  -CAITLYN         Total Minutes    Timed Charges Total Minutes 23  -CAITLYN       Total Minutes 23  -CAITLYN                User Key  (r) = Recorded By, (t) = Taken By, (c) = Cosigned By      Initials Name Provider Type    Daniella Carter PTA Physical Therapist Assistant                  Therapy Charges for Today       Code Description Service Date Service Provider Modifiers Qty    14083062213 HC PT THERAPEUTIC ACT EA 15 MIN 2024 Daniella Samuels PTA GP 2            PT G-Codes  Outcome Measure Options: AM-PAC 6 Clicks Basic Mobility (PT)  AM-PAC 6 Clicks Score (PT): 17  AM-PAC 6 Clicks Score (OT): 14    Daniella Samuels PTA  2024      Electronically signed by Daniella Samuels PTA at 24 1211       Occupational Therapy Notes (last 48 hours)  Notes from 24 1402 through 24 1402   No notes exist for this encounter.     Arturo Walters, PT DPT   Physical Therapist  Physical Therapy     Therapy Evaluation     Signed     Date of Service: 24  Creation Time: 24     Signed       Expand All Collapse All    Patient Name: Jennifer Gomes                   : 1940                        MRN: [id number]                              Today's Date: 2024                                 Admit Date: 2024                      Visit Dx:   Visit Diagnosis       ICD-10-CM ICD-9-CM   1. TOMÁS (acute kidney injury)  N17.9 584.9   2. Acute UTI  N39.0 599.0   3. Sepsis, due to unspecified organism, unspecified whether acute organ dysfunction  present  A41.9 038.9       995.91   4. Pressure injury of skin, unspecified injury stage, unspecified location  L89.90 707.00       707.20   5. Candidal intertrigo  B37.2 112.3   6. Altered mental status, unspecified altered mental status type  R41.82 780.97   7. Dysphagia, unspecified type [R13.10]  R13.10 787.20   8. Impaired mobility [Z74.09]  Z74.09 799.89         Problem List       Patient Active Problem List   Diagnosis    Gastroesophageal reflux disease    Spinal stenosis, lumbar region, without neurogenic claudication    Erosion of vaginal mesh    Adenocarcinoma of endometrium    S/P hysterectomy with oophorectomy    Encounter for follow-up surveillance of endometrial cancer    History of radiation therapy    Non-traumatic rhabdomyolysis    Metabolic encephalopathy    Acute renal failure superimposed on stage 3 chronic kidney disease    Medical non-compliance, does not take narcotics as prescribed    Chronic constipation    Chronic pain syndrome    Chronic prescription opiate use    Anemia    Hypothyroidism (acquired)    Essential hypertension    Venous insufficiency of both lower extremities    Chronic intractable headache    RAFAL (obstructive sleep apnea)    Acute encephalopathy due to UTI    Toxic metabolic encephalopathy    Polypharmacy    Frequent falls    Grade III internal hemorrhoids    External hemorrhoids    Colitis    Moderate malnutrition    Transaminitis    Acute UTI (urinary tract infection)    Polypharmacy    UTI (urinary tract infection)    Dementia    Hypokalemia    Sepsis due to urinary tract infection    Cardiopulmonary arrest    E coli bacteremia    Anemia requiring transfusions    Bilateral hydronephrosis    Acute urinary retention    TOMÁS (acute kidney injury)    Acute cystitis    Hypercalcemia    Paroxysmal atrial fibrillation         Medical History        Past Medical History:   Diagnosis Date    Anxiety      Arthritis      Bronchitis      Cancer       uterine    Chronic pain       Depression      Disease of thyroid gland      Fibromyalgia      GERD (gastroesophageal reflux disease)      Headache      History of transfusion       AS     Hyperlipidemia      Hypertension      Incontinence      Insomnia      Leg pain      Lumbar stenosis      Migraines      Peptic ulcer      Restless legs      Sleep apnea       NO C-PAP    UTI (urinary tract infection)      Vaginal bleeding           Surgical History         Past Surgical History:   Procedure Laterality Date    BLADDER REPAIR        MESH HAD TO BE REMOVED IN 2013    BREAST BIOPSY Right 2017     benign    BREAST CYST EXCISION Left 1982    CARDIAC CATHETERIZATION        CARPAL TUNNEL RELEASE        CATARACT EXTRACTION W/ INTRAOCULAR LENS  IMPLANT, BILATERAL        CHOLECYSTECTOMY WITH INTRAOPERATIVE CHOLANGIOGRAM N/A 2023     Procedure: CHOLECYSTECTOMY LAPAROSCOPIC INTRAOPERATIVE CHOLANGIOGRAM;  Surgeon: Gerardo Arciniega MD;  Location: [facility] OR;  Service: General;  Laterality: N/A;    COLONOSCOPY        COLONOSCOPY N/A 10/01/2021     Procedure: COLONOSCOPY WITH ANESTHESIA;  Surgeon: Tom Velasco DO;  Location: [facility] ENDOSCOPY;  Service: Gastroenterology;  Laterality: N/A;  pre: change in bowel habits  post: diverticulosis. hemorrhoids.   Olivia Mora, MORGAN          CYSTECTOMY        D & C HYSTEROSCOPY N/A 2017     Procedure: DILATATION AND CURETTAGE HYSTEROSCOPY;  Surgeon: Shasta Madrigal MD;  Location: [facility] OR;  Service:     DILATION AND CURETTAGE, DIAGNOSTIC / THERAPEUTIC       ENDOSCOPY   2010     Short segment of Arriola's,Moderate chroninc esophagogastritis and negative H.pylori    ENDOSCOPY N/A 2017     Procedure: ESOPHAGOGASTRODUODENOSCOPY WITH ANESTHESIA;  Surgeon: Tom Velasco DO;  Location: [facility] ENDOSCOPY;  Service:     EYE SURGERY         RETINA    HEMORRHOIDECTOMY SIGMOIDOSCOPY N/A 3/21/2023     Procedure: HEMORRHOIDECTOMY WITH EXAM UNDER ANESTHESIA;  Surgeon: Scott  Holly CABEZAS MD;  Location:  [facility] OR;  Service: General;  Laterality: N/A;    HYSTERECTOMY   12/20/2017    ORIF TIBIA/FIBULA FRACTURES Left 2000    TRANSVAGINAL TAPING SUSPENSION N/A 11/06/2017     Procedure: VAGINAL MESH REVISION;  Surgeon: Shasta Madrigal MD;  Location:  [facility] OR;  Service:     VAGINAL MESH REVISION   2013           General Information         Row Name 12/19/24 Critical access hospital                 Physical Therapy Time and Intention     Document Type evaluation  CC: fall, confused. Dx: AMS, metabolic encephalopathy, TOMÁS, UTI?, hypotensive  -MATTY       Mode of Treatment physical therapy  -MATTY          Row Name 12/19/24 Critical access hospital                 General Information     Patient Profile Reviewed yes  -MATTY       Prior Level of Function independent:;bed mobility;transfer;gait  per pt report but unsure of the accuracy of this information due to ED report of condition pt was found in.  -MATTY       Existing Precautions/Restrictions fall  -MATTY       Barriers to Rehab medically complex;previous functional deficit;physical barrier;cognitive status  -MATTY          Row Name 12/19/24 Critical access hospital                 Living Environment     People in Home child(sebastian), adult  recent d/c from [facility]  -MATTY          Row Name 12/19/24 Critical access hospital                 Cognition     Orientation Status (Cognition) oriented to;person;disoriented to;place;situation;time  -MATTY          Row Name 12/19/24 Critical access hospital                 Safety Issues/Impairments Affecting Functional Mobility     Safety Issues Affecting Function (Mobility) ability to follow commands;awareness of need for assistance;friction/shear risk;insight into deficits/self-awareness;judgment;problem-solving;safety precaution awareness;safety precautions follow-through/compliance  -MATTY       Impairments Affecting Function (Mobility) cognition;endurance/activity tolerance;strength;pain;range of motion (ROM)  -MATTY                       User Key  (r) = Recorded By, (t) = Taken By, (c) = Cosigned By        Initials Name  Provider Type     Arturo Nina, PT DPT Physical Therapist                            Mobility         Row Name 12/19/24 0936                 Bed Mobility     Bed Mobility rolling left;rolling right  -MATTY       Rolling Left [place] (Bed Mobility) dependent (less than 25% patient effort)  -MATTY       Rolling Right [place] (Bed Mobility) dependent (less than 25% patient effort)  -MATTY       Assistive Device (Bed Mobility) bed rails;repositioning sheet  -MATTY          Row Name 12/19/24 0936                 Transfers     Comment, (Transfers) declined all EOB or OOB activity this date  -MATTY                       User Key  (r) = Recorded By, (t) = Taken By, (c) = Cosigned By        Initials Name Provider Type     Arturo Nina, PT DPT Physical Therapist                            Obj/Interventions         Row Name 12/19/24 0936                 Range of Motion Comprehensive     Comment, General Range of Motion B LE AROM impaired 90%  -MATTY          Row Name 12/19/24 0936                 Strength Comprehensive (MMT)     Comment, General Manual Muscle Testing (MMT) Assessment B LE grossly 2-/5  -MATTY          Row Name 12/19/24 09                 Sensory Assessment (Somatosensory)     Sensory Assessment (Somatosensory) LE sensation intact  -MATTY                       User Key  (r) = Recorded By, (t) = Taken By, (c) = Cosigned By        Initials Name Provider Type     Arturo Nina, PT DPT Physical Therapist                            Goals/Plan         Row Name 12/19/24 0936                 Bed Mobility Goal 1 (PT)     Activity/Assistive Device (Bed Mobility Goal 1, PT) sit to supine/supine to sit  -MATTY       [place] Level/Cues Needed (Bed Mobility Goal 1, PT) moderate assist (50-74% patient effort)  -MATTY       Time Frame (Bed Mobility Goal 1, PT) long term goal (LTG);10 days  -MATTY       Progress/Outcomes (Bed Mobility Goal 1, PT) new goal  -MATTY          Row Name 12/19/24 0936                 Transfer Goal  1 (PT)     Activity/Assistive Device (Transfer Goal 1, PT) sit-to-stand/stand-to-sit;bed-to-chair/chair-to-bed  -MATTY       [place] Level/Cues Needed (Transfer Goal 1, PT) moderate assist (50-74% patient effort)  -MATTY       Time Frame (Transfer Goal 1, PT) long term goal (LTG);10 days  -MATTY       Progress/Outcome (Transfer Goal 1, PT) new goal  -MATTY          Row Name 12/19/24 09                 Therapy Assessment/Plan (PT)     Planned Therapy Interventions (PT) bed mobility training;transfer training;gait training;balance training;home exercise program;patient/family education;postural re-education;ROM (range of motion);strengthening;stretching  -MATTY                    User Key  (r) = Recorded By, (t) = Taken By, (c) = Cosigned By        Initials Name Provider Type     Arturo Nina, PT DPT Physical Therapist                         Clinical Impression         Row Name 12/19/24 09                 Pain     Pain Side/Orientation generalized  -MATTY       Pain Management Interventions exercise or physical activity utilized  -MATTY       Additional Documentation Pain Scale: FACES Pre/Post-Treatment (Group)  -MATTY          Row Name 12/19/24 09                 Pain Scale: FACES Pre/Post-Treatment     Pain: FACES Scale, Pretreatment 2-->hurts little bit  -MATTY          Row Name 12/19/24 09                 Plan of Care Review     Plan of Care Reviewed With patient  -MATTY       Outcome Evaluation PT eval complete. She was found lethargic but did awaken and was oriented to self. Per nsg, pt had been agitated and uncooperative with care this am which continued throughout our session. She reports at baseline recently discharging from a NH and has been residing at home with daughter where she was able to independently care for self. Usnure of accuracy however. She declined all EOB or OOB activity. She gives poor effort with ROM And strength assessment in bed. Was dependent for rolling left / right and repositioning in bed.  Continues to decline attempts for EOB activity. PT will contiue to make attempts at mobility and strengthening efforts. I am hopeful that once her cognition improves, she may become more participatory in her care. If patient remains non cooperative and refuses therapy, PT will sign off. Recommend d.c SNF.  -MATTY          Row Name 12/19/24 0936                 Therapy Assessment/Plan (PT)     Patient/Family Therapy Goals Statement (PT) did not state  -MATTY       Rehab Potential (PT) poor  -MATTY       Criteria for Skilled Interventions Met (PT) yes;meets criteria;skilled treatment is necessary  -MATTY       Therapy Frequency (PT) daily  -MATTY       Predicted Duration of Therapy Intervention (PT) unti ld.c  -MATTY          Row Name 12/19/24 0936                 Positioning and Restraints     Pre-Treatment Position in bed  -MATTY       Post Treatment Position bed  -MATTY       In Bed notified nsg;fowlers;side lying right;call light within reach;encouraged to call for assist;exit alarm on  -MATTY                       User Key  (r) = Recorded By, (t) = Taken By, (c) = Cosigned By        Initials Name Provider Type     Arturo Nina, PT DPT Physical Therapist                            Outcome Measures         Row Name 12/19/24 0936 12/19/24 0850            How much help from another person do you currently need...     Turning from your back to your side while in flat bed without using bedrails? 2  -MATTY 1  -AA     Moving from lying on back to sitting on the side of a flat bed without bedrails? 1  -MATTY 1  -AA     Moving to and from a bed to a chair (including a wheelchair)? 1  -MATTY 1  -AA     Standing up from a chair using your arms (e.g., wheelchair, bedside chair)? 1  -MATTY 1  -AA     Climbing 3-5 steps with a railing? 1  -MATTY 1  -AA     To walk in hospital room? 1  -MATTY 1  -AA     AM-PAC 6 Clicks Score (PT) 7  -MATTY 6  -AA     Highest Level of Mobility Goal 2 --> Bed activities/dependent transfer  -MATTY 2 --> Bed activities/dependent transfer   -ABILIO        Row Name 12/19/24 0936 12/19/24 0931            Functional Assessment     Outcome Measure Options AM-PAC 6 Clicks Basic Mobility (PT)  -MATTY AM-PAC 6 Clicks Daily Activity (OT)  -BESSY                     User Key  (r) = Recorded By, (t) = Taken By, (c) = Cosigned By        Initials Name Provider Type     Arturo Nina, PT DPT Physical Therapist     Leonor Michel, RN Registered Nurse     Blanka Carroll, OTR/L, CSRS Occupational Therapist                          Physical Therapy Education            Title: PT OT SLP Therapies (In Progress)         Topic: Physical Therapy (In Progress)         Point: Mobility training (In Progress)         Learning Progress Summary             Patient Nonacceptance, E, NR,NL,RT by MATTY at 12/19/2024 0936     Comment: benefits of activity, progression of PT                            Point: Home exercise program (Not Started)         Learner Progress:  Not documented in this visit.                  Point: Body mechanics (Not Started)         Learner Progress:  Not documented in this visit.                  Point: Precautions (Not Started)         Learner Progress:  Not documented in this visit.                                      User Key         Initials Effective Dates Name Provider Type Discipline     MATTY 02/03/23 -  Arturo Walters, PT DPT Physical Therapist PT                          PT Recommendation and Plan  Planned Therapy Interventions (PT): bed mobility training, transfer training, gait training, balance training, home exercise program, patient/family education, postural re-education, ROM (range of motion), strengthening, stretching  Outcome Evaluation: PT eval complete. She was found lethargic but did awaken and was oriented to self. Per nsg, pt had been agitated and uncooperative with care this am which continued throughout our session. She reports at baseline recently discharging from a NH and has been residing at home with daughter where she  was able to independently care for self. Usnure of accuracy however. She declined all EOB or OOB activity. She gives poor effort with ROM And strength assessment in bed. Was dependent for rolling left / right and repositioning in bed. Continues to decline attempts for EOB activity. PT will contiue to make attempts at mobility and strengthening efforts. I am hopeful that once her cognition improves, she may become more participatory in her care. If patient remains non cooperative and refuses therapy, PT will sign off. Recommend d.c SNF.      Time Calculation:        PT Charges         Row Name 12/19/24 1243                       Time Calculation     Start Time 0936  -MATTY         Stop Time 1004  + 10 minutes w chart review and attempted eval yesterday. 38 total minutes  -MATTY         Time Calculation (min) 28 min  -MATTY         PT Received On 12/19/24  -MATTY         PT Goal Re-Cert Due Date 12/29/24  -MATTY                 Untimed Charges     PT Eval/Re-eval Minutes 38  -MATTY                 Total Minutes     Untimed Charges Total Minutes 38  -MATTY          Total Minutes 38  -MATTY                      User Key  (r) = Recorded By, (t) = Taken By, (c) = Cosigned By        Initials Name Provider Type     Arturo Nina, PT DPT Physical Therapist                       Therapy Charges for Today         Code Description Service Date Service Provider Modifiers Qty     96237969555 HC PT EVAL MOD COMPLEXITY 3 12/19/2024 Arturo Walters PT DPT GP 1                PT G-Codes  Outcome Measure Options: AM-PAC 6 Clicks Basic Mobility (PT)  AM-PAC 6 Clicks Score (PT): 7  AM-PAC 6 Clicks Score (OT): 14  PT Discharge Summary  Anticipated Discharge Disposition (PT): skilled nursing facility     Arturo Walters, PT DPT               12/19/2024

## 2024-12-23 NOTE — PLAN OF CARE
Goal Outcome Evaluation:              Outcome Evaluation: VSS. Patient appeared apathetic today. Encouragement provided. Safety maintained.

## 2024-12-23 NOTE — PROGRESS NOTES
[facility] Palliative Care Services    Palliative Care Daily Progress Note   Chief complaint-support for patient and goals of care conversation    Code Status:   Code Status and Medical Interventions: No CPR (Do Not Attempt to Resuscitate); Limited Support; No intubation (DNI), No vasopressors, No dialysis, No artificial nutrition, No blood products, No cardioversion, No NIPPV (Non-Invasive Positive Pressu...   Ordered at: 12/19/24 1507     Medical Intervention Limits:    No intubation (DNI)    No vasopressors    No dialysis    No artificial nutrition    No blood products    No cardioversion    No NIPPV (Non-Invasive Positive Pressure Ventilation)     Level Of Support Discussed With:    Next of Kin (If No Surrogate)     Code Status (Patient has no pulse and is not breathing):    No CPR (Do Not Attempt to Resuscitate)     Medical Interventions (Patient has pulse or is breathing):    Limited Support     Comments:    Ivonne pelayo      Advanced Directives: Advance Directive Status: Patient does not have advance directive   Goals of Care: Ongoing.     S: Medical record reviewed. Events noted.  APS following and per SW note on 12/20, plans for discharge to nursing facility, pre-CERT pending.  Refusing labs and care over the weekend.  Advance Care Planning ongoing conversation with regard to care goals.  Patient has elected no aggressive life-prolonging measures including no CPR/limited support, no intubation, no vasopressors, no dialysis, no artificial nutrition.  We reviewed and initiated a MOST document, attending to complete.  Anticipating SNF at discharge.    Review of Systems   Constitutional: Positive for malaise/fatigue.   Respiratory:  Negative for shortness of breath.    Neurological:  Positive for weakness.   Psychiatric/Behavioral:  The patient is not nervous/anxious.        Pain Assessment  CPOT and PAINAD Scales: PAINAD (Pain Assessment in Advance Dementia Scale)  PAINAD Breathing:  0-->normal  PAINAD Negative Vocalization: 0-->none  PAINAD Facial Expression: 0-->smiling or inexpressive  PAINAD Body Language: 0-->relaxed  PAINAD Consolability: 0-->no need to console  PAINAD Score: 0  Pain Location: buttock  Pain Description: aching    O:     Intake/Output Summary (Last 24 hours) at 12/23/2024 1039  Last data filed at 12/22/2024 1700  Gross per 24 hour   Intake 240 ml   Output --   Net 240 ml       Diagnostics and current medications: Reviewed.      Current Facility-Administered Medications:     acetaminophen (TYLENOL) tablet 650 mg, 650 mg, Oral, Q6H PRN, Antonino Braden MD, 650 mg at 12/22/24 1948    atorvastatin (LIPITOR) tablet 40 mg, 40 mg, Oral, Daily, Antonino Braden MD, 40 mg at 12/23/24 0800    carvedilol (COREG) tablet 3.125 mg, 3.125 mg, Oral, BID With Meals, Kierra Schroeder, APRN, 3.125 mg at 12/23/24 0756    donepezil (ARICEPT) tablet 10 mg, 10 mg, Oral, Nightly, Antonino Braden MD, 10 mg at 12/22/24 1949    hydrocortisone-bacitracin-zinc oxide-nystatin (MAGIC BARRIER) ointment 1 Application, 1 Application, Topical, 4x Daily, Morenita Paredes, APRN, 1 Application at 12/23/24 0756    levothyroxine (SYNTHROID, LEVOTHROID) tablet 50 mcg, 50 mcg, Oral, Q AM, Antonino Braden MD, 50 mcg at 12/23/24 0542    pantoprazole (PROTONIX) EC tablet 40 mg, 40 mg, Oral, Q AM, Antonino Braden MD, 40 mg at 12/23/24 0542    QUEtiapine (SEROquel) tablet 25 mg, 25 mg, Oral, Nightly, Antonino Braden MD, 25 mg at 12/22/24 1949    sertraline (ZOLOFT) tablet 25 mg, 25 mg, Oral, Daily, Antonino Braden MD, 25 mg at 12/23/24 0800    [COMPLETED] Insert Peripheral IV, , , Once **AND** sodium chloride 0.9 % flush 10 mL, 10 mL, Intravenous, PRN, Antonino Braden MD    sodium chloride 0.9 % flush 10 mL, 10 mL, Intravenous, Q12H, Antonino Braden MD, 10 mL at 12/21/24 0940    sodium chloride 0.9 % infusion 40 mL, 40 mL, Intravenous, PRN, Antonino Braden MD    sodium chloride 0.9 % infusion, 50  "mL/hr, Intravenous, Continuous, Kierra Schroeder, APRN, Last Rate: 50 mL/hr at 12/21/24 1259, 50 mL/hr at 12/21/24 1259    Allergies   Allergen Reactions    Ropinirole Hcl Shortness Of Breath    Codeine Itching and Mental Status Change    Definity [Perflutren Lipid Microsphere] Other (See Comments)     Severe back pain    Ambien [Zolpidem] Other (See Comments)     HYPER     Eszopiclone Other (See Comments)     MAKES PT HYPER     Pregabalin Dizziness    Tizanidine Other (See Comments)     Terrible nightmares     I have utilized all available immediate resources to obtain, update, or review the patient's current medications (including all prescriptions, over-the-counter products, herbals, cannabis/cannabidiol products, and vitamin/mineral/dietary (nutritional) supplements) for name, route of administration, type, dose and frequency.    A:    /60 (BP Location: Right arm, Patient Position: Lying)   Pulse 78   Temp 98.3 °F (36.8 °C) (Oral)   Resp 18   Ht 157.5 cm (62\")   Wt 65.5 kg (144 lb 6.4 oz)   LMP  (LMP Unknown)   SpO2 99%   BMI 26.41 kg/m²     Vitals and nursing note reviewed.   Constitutional:       Appearance: Not in distress. Ill-appearing and chronically ill-appearing.   Eyes:      General: Lids are normal.   HENT:      Head: Normocephalic.   Pulmonary:      Effort: Pulmonary effort is normal.   Cardiovascular:      Normal rate.   Musculoskeletal:      Cervical back: Neck supple. Skin:     General: Skin is warm.   Neurological:      Mental Status: Alert and oriented to person, place and time.        Patient status: Disease state: Controlled with current treatments.  Current Functional status: Palliative Performance Scale Score: Performance 30% based on the following measures: Ambulation: Totally bed bound, Activity and Evidence of Disease: Unable to do any work, extensive evidence of disease, Self-Care: Total care required,  Intake: Reduced, LOC: Full, drowsy or confusion   Baseline ECOG " Status(4) Completely disabled, unable to carry out self-care.  Totally confined to bed or chair.  Nutritional status: Albumin 3.8 Body mass index is 26.41 kg/m².      Hospital Problem List      Metabolic encephalopathy    Acute renal failure superimposed on stage 3 chronic kidney disease    Essential hypertension    Dementia    Acute cystitis     Impression/Problem List:     Alzheimer's dementia open cerebral microvascular disease and atrophy per CT  Metabolic encephalopathy  Acute urinary tract infection, E. 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 Garcia  Depression  Hypothyroidism  Fibromyalgia  GERD  Hyperlipidemia  Hypertension  Incontinence associated dermatitis  Candidal intertrigo  Bowel and bladder incontinence  Restless leg syndrome  Obstructive sleep apnea-not using CPAP  Lumbar stenosis  Migraines  Advanced age       Recommendations/Plan:  1. plan: Goals of care include CODE STATUS No CPR/limited support interventions.     Family support: The patient receives support from her children..  Advance Directives: Advance Directive Status: Patient does not have advance directive   POA/Healthcare surrogate-spouse and daughter.     2.  Palliative care encounter  - Prognosis is poor to guarded long-term secondary to Alzheimer's dementia, recent cardiopulmonary arrest event, endometrioid adenocarcinoma, dysphagia, multiple comorbidities, and advanced age.     -Seen by this provider during October/2024 event at which time patient experienced cardiopulmonary arrest with successful resuscitation.  Patient elected to continue full aggressive care interventions including CPR.      -According to chart review patient was minimally responsive and unable to follow commands, nurse charts ER found a bottle of  "pills in her vagina identified as Sara IR 15 mg. The medication name on the bottle is not the same. Received Narcan.  APS following.    -Urine culture positive for E. coli.  Blood cultures pending.  Started on IV fluids, IV antibiotics.    -Evaluated by speech therapy and started on a modified diet with thin liquid, purée consistency food due to moderate oral dysphagia, one-to-one assistance with meals.    -Noted to have significant weight loss dietitian currently 144#, 175# 10/5/2024.   12/19-Noted to have hypotension this morning requiring fluid bolus, beta-blocker placed on hold.  Plans to receive electrolyte replacement.        12/19-CODE STATUS changes-no CPR/limited support interventions, no intubation, no vasopressors, no dialysis, no artificial nutrition, no blood products, no cardioversion, no NIPPV.  \"She would not want that\".   -discussed further de-escalation options with focus of comfort and best supportive care directed by hospice in the event of further decline and no further desire for rehospitalization's and aggressive care interventions.      12/23-clarified CODE STATUS with patient.  -A MOST document reviewed and initiated.  Attending to complete.  Electronic communication to attending provider  -Anticipate [facility] at discharge, pre-CERT pending    18 minutes spent on advance care planning-discussing with patient and/or family, answering questions, providing some guidance about a plan and documentation of care, and coordinating care face to face.       Thank you for allowing us to participate in patient's plan of care. Palliative Care Team will continue to follow patient.     Joana Rush, MORGAN  12/23/2024  10:39 CST   "

## 2024-12-23 NOTE — PROGRESS NOTES
"Patient Name: Jennifer Gomes  Date of Admission: 12/18/2024  Today's Date: 12/23/24  Length of Stay: 5  Primary Care Physician: Moiz Schwartz DO    Subjective   Chief Complaint: Altered mental status  Fall    Altered Mental Status     12/.22   Patient lying in bed, awake and alert.  Patient agreeable to [facility] however states it we will have to wait until, \"there is somebody investigating something\".  I did discuss ongoing PT/OT.  She states, \"I just want to Kayla here and not get up\".  She also states, \"as a [id number] I would just as soon close my eyes and never wake up\".  She denies suicidal thoughts.  She states she is just ready to go.  Patient is complaining of pain in the buttocks region from skin breakdown.  Condition is stable.  12/23  Feeling better UC echoli metabolic encephalopathy , SN[facility] Pending lab protective services involved polypharmacy urine drug screen positive for benzo and oxy started on Rocephin E. coli sensitive to Rocephin    Documented weights    12/18/24 0042 12/18/24 0434   Weight: 74.8 kg (165 lb) 65.5 kg (144 lb 6.4 oz)          Intake/Output Summary (Last 24 hours) at 12/23/2024 1036  Last data filed at 12/22/2024 1700  Gross per 24 hour   Intake 240 ml   Output --   Net 240 ml       Results for orders placed during the hospital encounter of 11/12/24    Adult Transthoracic Echo Complete W/ Cont if Necessary Per Protocol    Interpretation Summary    Left ventricular ejection fraction appears to be 66 - 70%.    Left ventricular wall thickness is consistent with mild concentric hypertrophy.    Left ventricular diastolic function is consistent with (grade Ia w/high LAP) impaired relaxation.    Left atrial volume is moderately increased.    Mild pulmonary hypertension is present.       Review of Systems   All pertinent negatives and positives are as above. All other systems have been reviewed and are negative unless otherwise stated.     Objective    Temp:  [97.7 °F (36.5 °C)-98.3 " °F (36.8 °C)] 98.3 °F (36.8 °C)  Heart Rate:  [66-81] 78  Resp:  [16-18] 18  BP: (140-151)/(48-69) 151/60  Physical Exam  Constitutional:       Appearance: Normal appearance. She is ill-appearing.   HENT:      Head: Normocephalic and atraumatic.      Mouth/Throat:      Mouth: Mucous membranes are moist.      Pharynx: Oropharynx is clear.   Eyes:      Extraocular Movements: Extraocular movements intact.      Conjunctiva/sclera: Conjunctivae normal.   Cardiovascular:      Rate and Rhythm: Normal rate and regular rhythm.      Pulses: Normal pulses.      Heart sounds: Normal heart sounds.   Pulmonary:      Effort: Pulmonary effort is normal.      Breath sounds: Normal breath sounds.   Abdominal:      General: There is no distension.      Palpations: Abdomen is soft.      Tenderness: There is no abdominal tenderness.   Musculoskeletal:      Cervical back: Normal range of motion and neck supple.      Right lower leg: No edema.      Left lower leg: No edema.      Comments: Generalized weakness   Skin:     General: Skin is warm and dry.      Findings: Bruising, erythema (Diffuse excoriation on buttocks and IFRAH area slowly improving) and rash (Under right breast) present.      Comments: Bilateral heel protectors in place  Pictures in chart for additional wounds   Neurological:      General: No focal deficit present.      Mental Status: She is oriented to person, place, and time.   Psychiatric:         Attention and Perception: Attention normal.         Mood and Affect: Affect is flat.         Speech: Speech normal.         Behavior: Behavior normal.         Cognition and Memory: Memory is impaired.       Results Review:  I have reviewed the labs, radiology results, and diagnostic studies.    Laboratory Data:   Results from last 7 days   Lab Units 12/21/24  0959 12/20/24  0411 12/19/24  1214 12/19/24  0311 12/18/24  0102   WBC 10*3/mm3  --  3.93  --  5.01 9.85   HEMOGLOBIN g/dL 8.7* 7.7* 7.6* 7.5* 10.4*   HEMATOCRIT % 27.2*  24.4* 23.9* 24.1* 32.6*   PLATELETS 10*3/mm3  --  118*  --  156 260        Results from last 7 days   Lab Units 12/21/24  0959 12/20/24  0411 12/19/24  1214 12/18/24  1242 12/18/24  0102   SODIUM mmol/L 141 145 144   < > 138   POTASSIUM mmol/L 3.4* 4.8 3.2*   < > 3.4*   CHLORIDE mmol/L 115* 122* 118*   < > 103   CO2 mmol/L 18.0* 15.0* 15.0*   < > 15.0*   BUN mg/dL 17 27* 35*   < > 53*   CREATININE mg/dL 1.34* 1.67* 2.05*   < > 2.95*   CALCIUM mg/dL 9.0 8.3* 8.4*   < > 9.4   BILIRUBIN mg/dL  --   --   --   --  0.2   ALK PHOS U/L  --   --   --   --  89   ALT (SGPT) U/L  --   --   --   --  5   AST (SGOT) U/L  --   --   --   --  14   GLUCOSE mg/dL 126* 82 106*   < > 119*    < > = values in this interval not displayed.       Culture Data:     Microbiology Results (last 10 days)       Procedure Component Value - Date/Time    Blood Culture - Blood, Arm, Right [[id number]]  (Normal) Collected: 12/18/24 0110    Lab Status: Final result Specimen: Blood from Arm, Right Updated: 12/23/24 0215     Blood Culture No growth at 5 days    COVID PRE-OP / PRE-PROCEDURE SCREENING ORDER (NO ISOLATION) - Swab, Nasal Cavity [[id number]]  (Normal) Collected: 12/18/24 0102    Lab Status: Final result Specimen: Swab from Nasal Cavity Updated: 12/18/24 0132    Narrative:      The following orders were created for panel order COVID PRE-OP / PRE-PROCEDURE SCREENING ORDER (NO ISOLATION) - Swab, Nasal Cavity.  Procedure                               Abnormality         Status                     ---------                               -----------         ------                     COVID-19 and FLU A/B PCR...[[id number]]  Normal              Final result                 Please view results for these tests on the individual orders.    COVID-19 and FLU A/B PCR, 1 HR TAT - Swab, Nasopharynx [[id number]]  (Normal) Collected: 12/18/24 0102    Lab Status: Final result Specimen: Swab from Nasopharynx Updated: 12/18/24 0132     COVID19 Not Detected      Influenza A PCR Not Detected     Influenza B PCR Not Detected    Narrative:      Fact sheet for providers: https://www.fda.gov/media/816320/download    Fact sheet for patients: https://www.fda.gov/media/503473/download    Test performed by PCR.    Urine Culture - Urine, Straight Cath [[id number]]  (Abnormal)  (Susceptibility) Collected: 12/18/24 0102    Lab Status: Final result Specimen: Urine from Straight Cath Updated: 12/20/24 0943     Urine Culture >100,000 CFU/mL Escherichia coli    Narrative:      Colonization of the urinary tract without infection is common. Treatment is discouraged unless the patient is symptomatic, pregnant, or undergoing an invasive urologic procedure.    Susceptibility        Escherichia coli      CHULA      Amoxicillin + Clavulanate Susceptible      Ampicillin Susceptible      Ampicillin + Sulbactam Susceptible      Cefazolin (Urine) Susceptible      Cefepime Susceptible      Ceftazidime Susceptible      Ceftriaxone Susceptible      Gentamicin Susceptible      Levofloxacin Susceptible      Nitrofurantoin Susceptible      Piperacillin + Tazobactam Susceptible      Trimethoprim + Sulfamethoxazole Susceptible                           Blood Culture - Blood, Arm, Right [[id number]]  (Normal) Collected: 12/18/24 0102    Lab Status: Final result Specimen: Blood from Arm, Right Updated: 12/23/24 0215     Blood Culture No growth at 5 days             Radiology Data:   Imaging Results (Last 7 Days)       Procedure Component Value Units Date/Time    MRI Brain Without Contrast [[id number]] Collected: 12/19/24 1658     Updated: 12/19/24 1704    Narrative:      MRI BRAIN WO CONTRAST- 12/19/2024 3:31 PM     HISTORY: stroke like symptoms; N17.9-Acute kidney failure, unspecified;  N39.0-Urinary tract infection, site not specified; A41.9-Sepsis,  unspecified organism; L89.90-Pressure ulcer of unspecified site,  unspecified stage; B37.2-Candidiasis of skin and nail; R41.82-Altered  mental status, unspecified;  R13.10-Dysphagia, unspecified; Z74.09-Other  reduced mobility     TECHNIQUE: Multisequence, multiplanar MRI of the brain without contrast     COMPARISON: None     FINDINGS:     No diffusion signal abnormality. No intra-axial or extra-axial  hemorrhage. No intracranial mass lesion or mass effect. Mild chronic  small vessel ischemic changes. Ventricles are nondilated. Posterior  fossa structures are unremarkable. Pituitary gland and sella are  unremarkable. The major intracranial flow-voids are preserved. Orbital  contents are unremarkable. Chronic right maxillary sinus disease.  Partial right mastoid effusion. Normal marrow signal in the skull base  and calvarium.       Impression:         1.  No acute intracranial process. In particular, there is no acute  ischemia.  2.  Chronic small vessel ischemic changes.  3.  Chronic paranasal sinus disease.  4.  Partial right mastoid effusion.     This report was signed and finalized on 12/19/2024 5:01 PM by Dr Tenzin Madrigal.       US Renal Bilateral [[id number]] Collected: 12/18/24 1223     Updated: 12/18/24 1316    Addenda:        ADDENDUM: It is noted that the technologist scanned the kidneys outside  of our normal protocol scanning the left kidney first followed by the  right kidney. Therefore, the measurements of the kidneys are reversed.  The left kidney measures 10.0 cm in [id number] length and the right  kidney 8.9 cm in ozhq-ld-[id number] length. Otherwise no change from the  previous exam.     This report was signed and finalized on 12/18/2024 1:13 PM by Dr. Chi Hinds MD.     Signed: 12/18/24 1313 by Chi Hinds MD    Narrative:      RENAL ULTRASOUND COMPLETE 12/18/2024 10:57 AM     REASON FOR EXAM: prior hydronephrosis, recurrent TOMÁS; N17.9-Acute kidney  failure, unspecified; N39.0-Urinary tract infection, site not specified;  A41.9-Sepsis, unspecified organism; L89.90-Pressure ulcer of unspecified  site, unspecified stage; B37.2-Candidiasis of skin and  nail;  R41.82-Altered mental status, unspecified; R13.10-Dysphagia, unspecified        COMPARISON: 11/15/2024     TECHNIQUE: Multiple longitudinal and transverse realtime sonographic  images of the kidneys and urinary bladder are obtained.  Images and  report are stored in PACS per institutional and state regulations.     FINDINGS:     RIGHT KIDNEY: 10.0 cm. Normal in size, shape, contour and position. No  solid or cystic masses. The central echo complex is compact with no  evidence for hydronephrosis. No nephrolithiasis or abnormal perinephric  fluid collections . No hydroureter.     LEFT KIDNEY: 8.9 cm. Normal in size, shape, contour and position. No  solid or cystic masses. The central echo complex is compact with no  evidence for hydronephrosis. No nephrolithiasis or abnormal perinephric  fluid collections . No hydroureter.     PELVIS: There is some debris within the bladder but with no discrete  mass. The urinary bladder is otherwise unremarkable. There is no  surrounding ascites.       Impression:      1. Debris within the bladder without evidence of discrete bladder mass  or bladder wall thickening.  2. Normal sonogram of the kidneys. No mass or hydronephrosis..           This report was signed and finalized on 12/18/2024 12:25 PM by Dr. Chi Hinds MD.       CT Cervical Spine Without Contrast [[id number]] Collected: 12/18/24 0609     Updated: 12/18/24 0718    Narrative:      EXAMINATION: CT CERVICAL SPINE WO CONTRAST-      12/18/2024 12:01 AM     HISTORY: fall; N17.9-Acute kidney failure, unspecified; N39.0-Urinary  tract infection, site not specified; A41.9-Sepsis, unspecified organism;  L89.90-Pressure ulcer of unspecified site, unspecified stage;  B37.2-Candidiasis of skin and nail; R41.82-Altered mental status,  unspecified     In order to have a CT radiation dose as low as reasonably achievable  Automated Exposure Control was utilized for adjustment of the mA and/or  KV according to patient size.      Total DLP = 331.85 mGy.cm     The CT scan of the cervical spine is performed with delved intravenous  or intrathecal contrast enhancement.     Images are acquired in axial plane and subsequent reconstruction in  coronal and sagittal planes.     Comparison is made with the previous study dated 9/4/2023.     There is no evidence of or compression.     No acute displacement or malalignment.     There is mild retrolisthesis of C5 over C6.     The curvature is maintained.     There is severe arthropathy of the atlantodental articulation with  complete obliteration of the atlantodental space.     There are chronic degenerative changes in the form of anterior and  posterior osteophytes, uncinate spurs and facet arthropathy. There is a  resultant mild narrowing of the left neuroforamina C2-3 and C3-4,  bilateral neuroforaminal stenosis at C4-5 C5-6 and C6-7. There is mild  spinal stenosis at C5-6.     Limited visualized prevertebral soft tissues are unremarkable. Thyroid  gland is incompletely visualized and not well evaluated.     Limited visualized lung apices are unremarkable.       Impression:      1. No acute fracture or malalignment.  2. Cervical spondylosis. Mild retrolisthesis C5 over C6.  3. Multilevel prominent disc osteophyte complexes, uncinate spurs and  facet arthropathy and resultant neural foraminal and spinal canal  stenoses are detailed above.     The above study was initially reviewed and reported by [facility]. I do not  find any discrepancies.                                            This report was signed and finalized on 12/18/2024 7:14 AM by Dr. Marty Suresh MD.       XR Chest 1 View [[id number]] Collected: 12/18/24 0649     Updated: 12/18/24 0653    Narrative:      EXAMINATION: XR CHEST 1 VW-     12/18/2024 12:35 AM     HISTORY: altered mentation; N17.9-Acute kidney failure, unspecified;  N39.0-Urinary tract infection, site not specified; A41.9-Sepsis,  unspecified organism; L89.90-Pressure  ulcer of unspecified site,  unspecified stage; B37.2-Candidiasis of skin and nail; R41.82-Altered  mental status, unspecified     A frontal projection of the chest is compared with the previous study  dated 10/5/2024.     The lungs are moderately expanded, significantly improved since the  previous study.     The right lung apex is obscured by the bony and soft tissue structures  of the mandible.     There is no evidence of recent infiltrate, pleural effusion, pulmonary  congestion or pneumothorax.     Heart size is in the normal range. Atheromatous changes of the tortuous  thoracic aorta is noted.     There is deformity of the right distal clavicle representing previous  healed fracture. There is no acute bony abnormality.       Impression:      1. No active cardiopulmonary disease.        This report was signed and finalized on 12/18/2024 6:50 AM by Dr. Marty Suresh MD.       CT Head Without Contrast [[id number]] Collected: 12/18/24 0603     Updated: 12/18/24 0611    Narrative:      EXAMINATION: CT HEAD WO CONTRAST-      12/18/2024 12:01 AM     HISTORY: altered mentation/fall; N17.9-Acute kidney failure,  unspecified; N39.0-Urinary tract infection, site not specified;  A41.9-Sepsis, unspecified organism; L89.90-Pressure ulcer of unspecified  site, unspecified stage; B37.2-Candidiasis of skin and nail;  R41.82-Altered mental status, unspecified     In order to have a CT radiation dose as low as reasonably achievable  Automated Exposure Control was utilized for adjustment of the mA and/or  KV according to patient size.     Total DLP = 852.88 mGy.cm     The CT scan of the head is performed without intravenous contrast  enhancement.     The images are acquired in axial plane and subsequent reconstruction in  coronal and sagittal planes.     Comparison is made with the previous study dated 10/5/2024.     There are significant motion artifacts which lowers the diagnostic  quality of the study. A subtle lesion may  not be evaluated or excluded.     There is no evidence of a mass. There is no midline shift.     There is no evidence of intracranial hemorrhage or hematoma.     There is moderate dilatation of the ventricles, basal cisterns and the  cortical sulci suggestive chronic volume loss, similar to the previous  study.     Scattered areas of chronic white matter ischemia bilaterally noted. The  gray-white matter differentiation is maintained.     Very limited visualized posterior fossa structures are grossly  unremarkable.     Images reviewed in bone window show no acute displaced or depressed  skull fracture. A subtle nondisplaced fracture may be obscured due to  significant motion artifact. There is opacification of the right  maxillary antrum similar to the previous study representing chronic  inflammatory process. Mastoid air cells are clear.       Impression:      1. Significant motion artifacts may obscure a subtle intracranial  abnormality or lesion. No obvious/visible acute intracranial  abnormality. If clinically warranted further evaluation with repeat CT  scan of the head or MR imaging of the brain when the patient is able to  cooperate.  2. Chronic ischemic and atrophic changes.     The above study was initially reviewed and reported by [facility]. I do not  find any discrepancies.                                            This report was signed and finalized on 12/18/2024 6:08 AM by Dr. Marty Suresh MD.                I have reviewed the patient's current medications.     atorvastatin, 40 mg, Oral, Daily  carvedilol, 3.125 mg, Oral, BID With Meals  donepezil, 10 mg, Oral, Nightly  hydrocortisone-bacitracin-zinc oxide-nystatin, 1 Application, Topical, 4x Daily  levothyroxine, 50 mcg, Oral, Q AM  pantoprazole, 40 mg, Oral, Q AM  QUEtiapine, 25 mg, Oral, Nightly  sertraline, 25 mg, Oral, Daily  sodium chloride, 10 mL, Intravenous, Q12H            Assessment/Plan   Assessment  Active Hospital Problems     Diagnosis     **Metabolic encephalopathy     Acute cystitis     Hypokalemia     Essential hypertension     Acute renal failure superimposed on stage 3 chronic kidney disease        Treatment Plan  1.  Metabolic encephalopathy-resolved, fall precautions, PT/OT working with patient, patient is reluctant to process the pain  2.  Acute cystitis, urine cultures-E. coli , completed Rocephin 3-day therapy  3. Essential hypertension-resolved, continue current antihypertensives, continuous cardiac monitoring, vital signs every 4 hours  4.  Acute renal failure superimposed on stage III CKD, normal saline 50/hr x 20 hours, labs in a.m (refused today 12/22).  5.  Hypokalemia, replace with 40 mEq x 2 today and start 20 mEq daily in a.m., labs in a.m.  6.  Home medications reviewed and restarted as appropriate, DVT prophylaxis with SCDs, continue to work closely with PT/OT, patient being followed by Adult Protective Services-[facility] rehab bed available when investigation complete, wound care following-see orders    Medical Decision Making  Metabolic encephalopathy, acute, high complexity, resolved  Acute cystitis, acute acute, high complexity, resolved  Hypokalemia, acute, moderate complexity, ongoing/unchanged  Acute renal failure superimposed on stage III chronic kidney disease, acute, high complexity, improving  Number and Complexity of problems: 5  Differential Diagnosis: None    Conditions and Status        Condition is unchanged.     MDM Data  External documents reviewed: None  Cardiac tracing (EKG, telemetry) interpretation: Telemetry normal sinus rhythm 60-79  Radiology interpretation: Reviewed  Labs reviewed: Yes  Any tests that were considered but not ordered: None     Decision rules/scores evaluated (example [id number], Wells, etc): None     Discussed with: Patient, daughter, and Dr. Espitia     Care Planning  Shared decision making: Patient, daughter, and Dr. Espitia  Code status and discussions: Full  Surrogate  Decision Maker: Ivonne Azul, daughter    Disposition  Social Determinants of Health that impact treatment or disposition: [facility] at discharge  I expect the patient to be discharged to [facility] in 1 to 2  days.     Electronically signed by Dheeraj Kent MD-BC, 12/23/24, 10:36 CST.

## 2024-12-23 NOTE — THERAPY TREATMENT NOTE
Acute Care - Physical Therapy Treatment Note  [facility]     Patient Name: Jennifer Gomes  : 1940  MRN: [id number]  Today's Date: 2024      Visit Dx:     ICD-10-CM ICD-9-CM   1. TOMÁS (acute kidney injury)  N17.9 584.9   2. Acute UTI  N39.0 599.0   3. Sepsis, due to unspecified organism, unspecified whether acute organ dysfunction present  A41.9 038.9     995.91   4. Pressure injury of skin, unspecified injury stage, unspecified location  L89.90 707.00     707.20   5. Candidal intertrigo  B37.2 112.3   6. Altered mental status, unspecified altered mental status type  R41.82 780.97   7. Dysphagia, unspecified type [R13.10]  R13.10 787.20   8. Impaired mobility [Z74.09]  Z74.09 799.89     Patient Active Problem List   Diagnosis    Gastroesophageal reflux disease    Spinal stenosis, lumbar region, without neurogenic claudication    Erosion of vaginal mesh    Adenocarcinoma of endometrium    S/P hysterectomy with oophorectomy    Encounter for follow-up surveillance of endometrial cancer    History of radiation therapy    Non-traumatic rhabdomyolysis    Metabolic encephalopathy    Acute renal failure superimposed on stage 3 chronic kidney disease    Medical non-compliance, does not take narcotics as prescribed    Chronic constipation    Chronic pain syndrome    Chronic prescription opiate use    Anemia    Hypothyroidism (acquired)    Essential hypertension    Venous insufficiency of both lower extremities    Chronic intractable headache    RAFAL (obstructive sleep apnea)    Acute encephalopathy due to UTI    Toxic metabolic encephalopathy    Polypharmacy    Frequent falls    Grade III internal hemorrhoids    External hemorrhoids    Colitis    Moderate malnutrition    Transaminitis    Acute UTI (urinary tract infection)    Polypharmacy    UTI (urinary tract infection)    Dementia    Hypokalemia    Sepsis due to urinary tract infection    Cardiopulmonary arrest    E coli bacteremia    Anemia requiring  transfusions    Bilateral hydronephrosis    Acute urinary retention    TOMÁS (acute kidney injury)    Acute cystitis    Hypercalcemia    Paroxysmal atrial fibrillation     Past Medical History:   Diagnosis Date    Anxiety     Arthritis     Bronchitis     Cancer     uterine    Chronic pain     Depression     Disease of thyroid gland     Fibromyalgia     GERD (gastroesophageal reflux disease)     Headache     History of transfusion     AS     Hyperlipidemia     Hypertension     Incontinence     Insomnia     Leg pain     Lumbar stenosis     Migraines     Peptic ulcer     Restless legs     Sleep apnea     NO C-PAP    UTI (urinary tract infection)     Vaginal bleeding      Past Surgical History:   Procedure Laterality Date    BLADDER REPAIR      MESH HAD TO BE REMOVED IN 2013    BREAST BIOPSY Right 2017    benign    BREAST CYST EXCISION Left 1982    CARDIAC CATHETERIZATION      CARPAL TUNNEL RELEASE      CATARACT EXTRACTION W/ INTRAOCULAR LENS  IMPLANT, BILATERAL      CHOLECYSTECTOMY WITH INTRAOPERATIVE CHOLANGIOGRAM N/A 2023    Procedure: CHOLECYSTECTOMY LAPAROSCOPIC INTRAOPERATIVE CHOLANGIOGRAM;  Surgeon: Gerardo Arciniega MD;  Location:  [facility] OR;  Service: General;  Laterality: N/A;    COLONOSCOPY      COLONOSCOPY N/A 10/01/2021    Procedure: COLONOSCOPY WITH ANESTHESIA;  Surgeon: Tom Velasco DO;  Location:  [facility];  Service: Gastroenterology;  Laterality: N/A;  pre: change in bowel habits  post: diverticulosis. hemorrhoids.   Olivia Mora APRN        CYSTECTOMY      D & C HYSTEROSCOPY N/A 2017    Procedure: DILATATION AND CURETTAGE HYSTEROSCOPY;  Surgeon: Shasta Madrigal MD;  Location: [facility] OR;  Service:     DILATION AND CURETTAGE, DIAGNOSTIC / THERAPEUTIC      ENDOSCOPY  2010    Short segment of Arriola's,Moderate chroninc esophagogastritis and negative H.pylori    ENDOSCOPY N/A 2017    Procedure: ESOPHAGOGASTRODUODENOSCOPY WITH ANESTHESIA;  Surgeon:  Tom Velasco, ;  Location:  [facility];  Service:     EYE SURGERY      RETINA    HEMORRHOIDECTOMY SIGMOIDOSCOPY N/A 3/21/2023    Procedure: HEMORRHOIDECTOMY WITH EXAM UNDER ANESTHESIA;  Surgeon: Holly Chavez MD;  Location:  [facility] OR;  Service: General;  Laterality: N/A;    HYSTERECTOMY  12/20/2017    ORIF TIBIA/FIBULA FRACTURES Left 2000    TRANSVAGINAL TAPING SUSPENSION N/A 11/06/2017    Procedure: VAGINAL MESH REVISION;  Surgeon: Shasta Madrigal MD;  Location:  [facility] OR;  Service:     VAGINAL MESH REVISION  2013     PT Assessment (Last 12 Hours)       PT Evaluation and Treatment       Row Name 12/23/24 1007          Physical Therapy Time and Intention    Subjective Information no complaints  -     Document Type therapy note (daily note)  -     Mode of Treatment physical therapy  -       Row Name 12/23/24 1007          General Information    Existing Precautions/Restrictions fall  -       Row Name 12/23/24 1007          Bed Mobility    Supine-Sit [place] (Bed Mobility) verbal cues;contact guard  -     Sit-Supine [place] (Bed Mobility) verbal cues;minimum assist (75% patient effort)  -     Assistive Device (Bed Mobility) bed rails  -       Row Name 12/23/24 1007          Balance    Static Sitting Balance standby assist  -     Position, Sitting Balance unsupported;sitting edge of bed  -     Balance Interventions --  able to reach all directions but limited movement of trunk. Very weak trunk stabs.  -     Comment, Balance sat EOb 20 minutes  -       Row Name 12/23/24 1007          Hip (Therapeutic Exercise)    Hip AROM (Therapeutic Exercise) bilateral;flexion;aBduction;aDduction;sitting;15 repititions  -       Row Name 12/23/24 1007          Knee (Therapeutic Exercise)    Knee AROM (Therapeutic Exercise) bilateral;LAQ (long arc quad);sitting;20 repititions  -       Row Name 12/23/24 1007          Ankle (Therapeutic Exercise)    Ankle AROM (Therapeutic Exercise)  bilateral;dorsiflexion;plantarflexion;sitting;20 repititions  -CAITLYN       Row Name             Wound 12/18/24 0054 Bilateral coccyx    Wound - Properties Group Placement Date: 12/18/24  -SM Placement Time: 0054  -SM Side: Bilateral  -SM Location: coccyx  -SM    Retired Wound - Properties Group Placement Date: 12/18/24  -SM Placement Time: 0054  -SM Side: Bilateral  -SM Location: coccyx  -SM    Retired Wound - Properties Group Placement Date: 12/18/24  -SM Placement Time: 0054  -SM Side: Bilateral  -SM Location: coccyx  -SM    Retired Wound - Properties Group Date first assessed: 12/18/24  -SM Time first assessed: 0054  -SM Side: Bilateral  -SM Location: coccyx  -SM      Row Name             Wound 12/18/24 0055 Left anterior greater trochanter    Wound - Properties Group Placement Date: 12/18/24  -SM Placement Time: 0055  -SM Side: Left  -SM Orientation: anterior  -SM Location: greater trochanter  -SM    Retired Wound - Properties Group Placement Date: 12/18/24  -SM Placement Time: 0055  -SM Side: Left  -SM Orientation: anterior  -SM Location: greater trochanter  -SM    Retired Wound - Properties Group Placement Date: 12/18/24  -SM Placement Time: 0055  -SM Side: Left  -SM Orientation: anterior  -SM Location: greater trochanter  -SM    Retired Wound - Properties Group Date first assessed: 12/18/24  -SM Time first assessed: 0055  -SM Side: Left  -SM Location: greater trochanter  -SM      Row Name             Wound 12/18/24 0107 nose    Wound - Properties Group Placement Date: 12/18/24  -SM Placement Time: 0107  -SM Location: nose  -SM    Retired Wound - Properties Group Placement Date: 12/18/24  -SM Placement Time: 0107  -SM Location: nose  -SM    Retired Wound - Properties Group Placement Date: 12/18/24  -SM Placement Time: 0107  -SM Location: nose  -SM    Retired Wound - Properties Group Date first assessed: 12/18/24  -SM Time first assessed: 0107  -SM Location: nose  -SM      Row Name             Wound 12/18/24 0151  Left breast    Wound - Properties Group Placement Date: 12/18/24  -SM Placement Time: 0151 -SM Side: Left  -SM Orientation: --  -SM Location: breast  -SM    Retired Wound - Properties Group Placement Date: 12/18/24  -SM Placement Time: 0151 -SM Side: Left  -SM Orientation: --  -SM Location: breast  -SM    Retired Wound - Properties Group Placement Date: 12/18/24  -SM Placement Time: 0151 -SM Side: Left  -SM Orientation: --  -SM Location: breast  -SM    Retired Wound - Properties Group Date first assessed: 12/18/24  -SM Time first assessed: 0151 -SM Side: Left  -SM Location: breast  -SM      Row Name 12/23/24 1007          Positioning and Restraints    Pre-Treatment Position in bed  -CAITLYN     Post Treatment Position bed  -CAITLYN     In Bed side lying left;notified nsg;call light within reach;encouraged to call for assist;exit alarm on  -CAITLYN               User Key  (r) = Recorded By, (t) = Taken By, (c) = Cosigned By      Initials Name Provider Type    Daniella Carter PTA Physical Therapist Assistant    Lupe Love RN Registered Nurse                    Physical Therapy Education       Title: PT OT SLP Therapies (In Progress)       Topic: Physical Therapy (In Progress)       Point: Mobility training (Done)       Learning Progress Summary            Patient Acceptance, E,TB, VU by ESTER at 12/21/2024 0334    Acceptance, E, DU by CAITLYN at 12/20/2024 1140    Comment: bed mobility.    Nonacceptance, E, NR,NL,RT by MATTY at 12/19/2024 0936    Comment: benefits of activity, progression of PT                      Point: Home exercise program (Not Started)       Learner Progress:  Not documented in this visit.              Point: Body mechanics (Not Started)       Learner Progress:  Not documented in this visit.              Point: Precautions (Not Started)       Learner Progress:  Not documented in this visit.                              User Key       Initials Effective Dates Name Provider Type Luis STARR  02/03/23 -  Arturo Walters PT DPT Physical Therapist PT    CAITLYN 02/03/23 -  Daniella Samuels PTA Physical Therapist Assistant PT     11/20/23 -  Orin Peralta, ELBA Registered Nurse Nurse                  PT Recommendation and Plan         Outcome Measures       Row Name 12/23/24 1100             How much help from another person do you currently need...    Turning from your back to your side while in flat bed without using bedrails? 4  -CAITLYN      Moving from lying on back to sitting on the side of a flat bed without bedrails? 4  -CAITLYN      Moving to and from a bed to a chair (including a wheelchair)? 3  -CAITLYN      Standing up from a chair using your arms (e.g., wheelchair, bedside chair)? 3  -CAITLYN      Climbing 3-5 steps with a railing? 2  -CAITLYN      To walk in hospital room? 1  -CAITLYN      AM-PAC 6 Clicks Score (PT) 17  -CAITLYN                User Key  (r) = Recorded By, (t) = Taken By, (c) = Cosigned By      Initials Name Provider Type    Daniella Carter PTA Physical Therapist Assistant                     Time Calculation:    PT Charges       Row Name 12/23/24 1210             Time Calculation    Start Time 1007  -CAITLYN      Stop Time 1030  -CAITLYN      Time Calculation (min) 23 min  -CAITLYN         Timed Charges    80187 - PT Therapeutic Activity Minutes 23  -CAITLYN         Total Minutes    Timed Charges Total Minutes 23  -CAITLYN       Total Minutes 23  -CAITLYN                User Key  (r) = Recorded By, (t) = Taken By, (c) = Cosigned By      Initials Name Provider Type    Daniella Carter PTA Physical Therapist Assistant                  Therapy Charges for Today       Code Description Service Date Service Provider Modifiers Qty    [id number]  PT THERAPEUTIC ACT EA 15 MIN 12/23/2024 Daniella Samuels PTA GP 2            PT G-Codes  Outcome Measure Options: AM-PAC 6 Clicks Basic Mobility (PT)  AM-PAC 6 Clicks Score (PT): 17  AM-PAC 6 Clicks Score (OT): 14    Daniella Samuels PTA  12/23/2024

## 2024-12-23 NOTE — PLAN OF CARE
Goal Outcome Evaluation:              Outcome Evaluation: VSS. continues with no tele monitor at this time. pt continues to refuse insertion of new IV. pt having c/o pain and received medications per physician orders. continues to have barrier cream applied to buttocks. PO fluids encouraged. continuing with plan of care. call light within reach. bed alarm set. safety maintained.

## 2024-12-24 VITALS
TEMPERATURE: 97.3 F | OXYGEN SATURATION: 97 % | SYSTOLIC BLOOD PRESSURE: 148 MMHG | HEART RATE: 74 BPM | HEIGHT: 62 IN | DIASTOLIC BLOOD PRESSURE: 60 MMHG | RESPIRATION RATE: 14 BRPM | BODY MASS INDEX: 26.57 KG/M2 | WEIGHT: 144.4 LBS

## 2024-12-24 PROBLEM — N30.00 ACUTE CYSTITIS: Status: RESOLVED | Noted: 2024-11-12 | Resolved: 2024-12-24

## 2024-12-24 LAB
ALBUMIN SERPL-MCNC: 2.8 G/DL (ref 3.5–5.2)
ALBUMIN/GLOB SERPL: 1 G/DL
ALP SERPL-CCNC: 61 U/L (ref 39–117)
ALT SERPL W P-5'-P-CCNC: <5 U/L (ref 1–33)
ANION GAP SERPL CALCULATED.3IONS-SCNC: 10 MMOL/L (ref 5–15)
AST SERPL-CCNC: 7 U/L (ref 1–32)
BASOPHILS # BLD AUTO: 0.02 10*3/MM3 (ref 0–0.2)
BASOPHILS NFR BLD AUTO: 0.3 % (ref 0–1.5)
BILIRUB SERPL-MCNC: 0.2 MG/DL (ref 0–1.2)
BUN SERPL-MCNC: 17 MG/DL (ref 8–23)
BUN/CREAT SERPL: 13 (ref 7–25)
CALCIUM SPEC-SCNC: 9.1 MG/DL (ref 8.6–10.5)
CHLORIDE SERPL-SCNC: 113 MMOL/L (ref 98–107)
CO2 SERPL-SCNC: 18 MMOL/L (ref 22–29)
CREAT SERPL-MCNC: 1.31 MG/DL (ref 0.57–1)
DEPRECATED RDW RBC AUTO: 52.5 FL (ref 37–54)
EGFRCR SERPLBLD CKD-EPI 2021: 40.8 ML/MIN/1.73
EOSINOPHIL # BLD AUTO: 0.12 10*3/MM3 (ref 0–0.4)
EOSINOPHIL NFR BLD AUTO: 1.9 % (ref 0.3–6.2)
ERYTHROCYTE [DISTWIDTH] IN BLOOD BY AUTOMATED COUNT: 15.1 % (ref 12.3–15.4)
GLOBULIN UR ELPH-MCNC: 2.9 GM/DL
GLUCOSE SERPL-MCNC: 99 MG/DL (ref 65–99)
HCT VFR BLD AUTO: 27.6 % (ref 34–46.6)
HGB BLD-MCNC: 8.6 G/DL (ref 12–15.9)
IMM GRANULOCYTES # BLD AUTO: 0.04 10*3/MM3 (ref 0–0.05)
IMM GRANULOCYTES NFR BLD AUTO: 0.6 % (ref 0–0.5)
LYMPHOCYTES # BLD AUTO: 2.22 10*3/MM3 (ref 0.7–3.1)
LYMPHOCYTES NFR BLD AUTO: 34.8 % (ref 19.6–45.3)
MCH RBC QN AUTO: 29.6 PG (ref 26.6–33)
MCHC RBC AUTO-ENTMCNC: 31.2 G/DL (ref 31.5–35.7)
MCV RBC AUTO: 94.8 FL (ref 79–97)
MONOCYTES # BLD AUTO: 0.52 10*3/MM3 (ref 0.1–0.9)
MONOCYTES NFR BLD AUTO: 8.2 % (ref 5–12)
NEUTROPHILS NFR BLD AUTO: 3.46 10*3/MM3 (ref 1.7–7)
NEUTROPHILS NFR BLD AUTO: 54.2 % (ref 42.7–76)
NRBC BLD AUTO-RTO: 0 /100 WBC (ref 0–0.2)
PLATELET # BLD AUTO: 146 10*3/MM3 (ref 140–450)
PMV BLD AUTO: 10.3 FL (ref 6–12)
POTASSIUM SERPL-SCNC: 4.1 MMOL/L (ref 3.5–5.2)
PROT SERPL-MCNC: 5.7 G/DL (ref 6–8.5)
RBC # BLD AUTO: 2.91 10*6/MM3 (ref 3.77–5.28)
SODIUM SERPL-SCNC: 141 MMOL/L (ref 136–145)
WBC NRBC COR # BLD AUTO: 6.38 10*3/MM3 (ref 3.4–10.8)

## 2024-12-24 PROCEDURE — 85025 COMPLETE CBC W/AUTO DIFF WBC: CPT | Performed by: HOSPITALIST

## 2024-12-24 PROCEDURE — 80053 COMPREHEN METABOLIC PANEL: CPT | Performed by: HOSPITALIST

## 2024-12-24 PROCEDURE — 92526 ORAL FUNCTION THERAPY: CPT

## 2024-12-24 RX ORDER — CEFDINIR 300 MG/1
300 CAPSULE ORAL
Qty: 3 CAPSULE | Refills: 0 | Status: SHIPPED | OUTPATIENT
Start: 2024-12-24 | End: 2024-12-27

## 2024-12-24 RX ORDER — BUTALBITAL, ACETAMINOPHEN AND CAFFEINE 50; 325; 40 MG/1; MG/1; MG/1
1 TABLET ORAL 2 TIMES DAILY
Qty: 6 TABLET | Refills: 0 | Status: SHIPPED | OUTPATIENT
Start: 2024-12-24 | End: 2024-12-27

## 2024-12-24 RX ORDER — CEFDINIR 300 MG/1
300 CAPSULE ORAL
Status: DISCONTINUED | OUTPATIENT
Start: 2024-12-24 | End: 2024-12-24 | Stop reason: HOSPADM

## 2024-12-24 RX ADMIN — ACETAMINOPHEN 650 MG: 325 TABLET ORAL at 13:44

## 2024-12-24 RX ADMIN — CARVEDILOL 3.12 MG: 3.12 TABLET, FILM COATED ORAL at 07:47

## 2024-12-24 RX ADMIN — ZINC OXIDE 1 APPLICATION: 200 OINTMENT TOPICAL at 13:44

## 2024-12-24 RX ADMIN — ATORVASTATIN CALCIUM 40 MG: 40 TABLET, FILM COATED ORAL at 07:47

## 2024-12-24 RX ADMIN — ZINC OXIDE 1 APPLICATION: 200 OINTMENT TOPICAL at 07:47

## 2024-12-24 RX ADMIN — LEVOTHYROXINE SODIUM 50 MCG: 0.05 TABLET ORAL at 05:43

## 2024-12-24 RX ADMIN — CEFDINIR 300 MG: 300 CAPSULE ORAL at 13:44

## 2024-12-24 RX ADMIN — SERTRALINE HYDROCHLORIDE 25 MG: 50 TABLET ORAL at 07:46

## 2024-12-24 RX ADMIN — PANTOPRAZOLE SODIUM 40 MG: 40 TABLET, DELAYED RELEASE ORAL at 05:43

## 2024-12-24 NOTE — PLAN OF CARE
Goal Outcome Evaluation:  Plan of Care Reviewed With: patient        Progress: improving  Outcome Evaluation: VSS.  No tele orders.  Pt removed IV last night.  Pt is very eager to d/c to [facility].  Safety maintained.

## 2024-12-24 NOTE — PLAN OF CARE
Goal Outcome Evaluation:              Outcome Evaluation: VSS. Agreeable to new IV after physician consultation. Safety maintained.

## 2024-12-24 NOTE — CASE MANAGEMENT/SOCIAL WORK
Continued Stay Note  [facility]     Patient Name: Jennifer Gomes  MRN: [id number]  Today's Date: 12/24/2024    Admit Date: 12/18/2024    Plan: [facility]   Discharge Plan       Row Name 12/24/24 1135       Plan    Plan [facility]    Patient/Family in Agreement with Plan yes    Plan Comments Precert. completed.  Jorge Alberto at [facility] aware of pt's dc to SNF.  [facility] has spoken with [facility] and can accept pt today.  Carrie:  Phone:  [phone] Fax: [phone].    Final Discharge Disposition Code 03 - skilled nursing facility (SNF)      Row Name 12/24/24 1037       Plan    Plan [facility]    Patient/Family in Agreement with Plan yes                   Discharge Codes    No documentation.                 Expected Discharge Date and Time       Expected Discharge Date Expected Discharge Time    Dec 24, 2024               JEFRY Devi

## 2024-12-24 NOTE — THERAPY TREATMENT NOTE
Acute Care - Speech Language Pathology   Swallow Treatment Note [facility]     Patient Name: Jennifer Gomes  : 1940  MRN: [id number]  Today's Date: 2024               Admit Date: 2024    Patient seen to address diet tolerance. Patient denied pain. Patient seen at the end of breakfast. Patient consumed cream of wheat, milk and juice for breakfast. Patient denies difficulty swallowing meal. Patient given trials of thin liquids and a regular consistency solid. Patient mastication adequate. Slight oral residue after the swallow, which the patient cleared with a thin liquid wash. Patient displayed no indication of aspiration at bedside. Patient is safe to advance to a regular diet with thin liquids. Patient would benefit from continued ST. ST will continue POC.    PACO Gutierrez 2024 09:51 CST      Visit Dx:     ICD-10-CM ICD-9-CM   1. TOMÁS (acute kidney injury)  N17.9 584.9   2. Acute UTI  N39.0 599.0   3. Sepsis, due to unspecified organism, unspecified whether acute organ dysfunction present  A41.9 038.9     995.91   4. Pressure injury of skin, unspecified injury stage, unspecified location  L89.90 707.00     707.20   5. Candidal intertrigo  B37.2 112.3   6. Altered mental status, unspecified altered mental status type  R41.82 780.97   7. Dysphagia, unspecified type [R13.10]  R13.10 787.20   8. Impaired mobility [Z74.09]  Z74.09 799.89     Patient Active Problem List   Diagnosis    Gastroesophageal reflux disease    Spinal stenosis, lumbar region, without neurogenic claudication    Erosion of vaginal mesh    Adenocarcinoma of endometrium    S/P hysterectomy with oophorectomy    Encounter for follow-up surveillance of endometrial cancer    History of radiation therapy    Non-traumatic rhabdomyolysis    Metabolic encephalopathy    Acute renal failure superimposed on stage 3 chronic kidney disease    Medical non-compliance, does not take narcotics as prescribed    Chronic constipation     Chronic pain syndrome    Chronic prescription opiate use    Anemia    Hypothyroidism (acquired)    Essential hypertension    Venous insufficiency of both lower extremities    Chronic intractable headache    RAFAL (obstructive sleep apnea)    Acute encephalopathy due to UTI    Toxic metabolic encephalopathy    Polypharmacy    Frequent falls    Grade III internal hemorrhoids    External hemorrhoids    Colitis    Moderate malnutrition    Transaminitis    Acute UTI (urinary tract infection)    Polypharmacy    UTI (urinary tract infection)    Dementia    Hypokalemia    Sepsis due to urinary tract infection    Cardiopulmonary arrest    E coli bacteremia    Anemia requiring transfusions    Bilateral hydronephrosis    Acute urinary retention    TOMÁS (acute kidney injury)    Acute cystitis    Hypercalcemia    Paroxysmal atrial fibrillation     Past Medical History:   Diagnosis Date    Anxiety     Arthritis     Bronchitis     Cancer     uterine    Chronic pain     Depression     Disease of thyroid gland     Fibromyalgia     GERD (gastroesophageal reflux disease)     Headache     History of transfusion     AS     Hyperlipidemia     Hypertension     Incontinence     Insomnia     Leg pain     Lumbar stenosis     Migraines     Peptic ulcer     Restless legs     Sleep apnea     NO C-PAP    UTI (urinary tract infection)     Vaginal bleeding      Past Surgical History:   Procedure Laterality Date    BLADDER REPAIR      MESH HAD TO BE REMOVED IN 2013    BREAST BIOPSY Right 2017    benign    BREAST CYST EXCISION Left     CARDIAC CATHETERIZATION      CARPAL TUNNEL RELEASE      CATARACT EXTRACTION W/ INTRAOCULAR LENS  IMPLANT, BILATERAL      CHOLECYSTECTOMY WITH INTRAOPERATIVE CHOLANGIOGRAM N/A 2023    Procedure: CHOLECYSTECTOMY LAPAROSCOPIC INTRAOPERATIVE CHOLANGIOGRAM;  Surgeon: Gerardo Arciniega MD;  Location: [facility];  Service: General;  Laterality: N/A;    COLONOSCOPY      COLONOSCOPY N/A 10/01/2021    Procedure:  COLONOSCOPY WITH ANESTHESIA;  Surgeon: Tom Velasco DO;  Location:  [facility];  Service: Gastroenterology;  Laterality: N/A;  pre: change in bowel habits  post: diverticulosis. hemorrhoids.   Olivia Mora APRN        CYSTECTOMY      D & C HYSTEROSCOPY N/A 11/06/2017    Procedure: DILATATION AND CURETTAGE HYSTEROSCOPY;  Surgeon: Shasta Madrigal MD;  Location: [facility] OR;  Service:     DILATION AND CURETTAGE, DIAGNOSTIC / THERAPEUTIC  2008    ENDOSCOPY  09/23/2010    Short segment of Arriola's,Moderate chroninc esophagogastritis and negative H.pylori    ENDOSCOPY N/A 09/25/2017    Procedure: ESOPHAGOGASTRODUODENOSCOPY WITH ANESTHESIA;  Surgeon: Tom Velasco DO;  Location: [facility] ENDOSCOPY;  Service:     EYE SURGERY      RETINA    HEMORRHOIDECTOMY SIGMOIDOSCOPY N/A 3/21/2023    Procedure: HEMORRHOIDECTOMY WITH EXAM UNDER ANESTHESIA;  Surgeon: Holly Chavez MD;  Location: [facility] OR;  Service: General;  Laterality: N/A;    HYSTERECTOMY  12/20/2017    ORIF TIBIA/FIBULA FRACTURES Left 2000    TRANSVAGINAL TAPING SUSPENSION N/A 11/06/2017    Procedure: VAGINAL MESH REVISION;  Surgeon: Shasta Madrigal MD;  Location: [facility] OR;  Service:     VAGINAL MESH REVISION  2013       SLP Recommendation and Plan     SLP Diet Recommendation: regular textures, thin liquids (12/24/24 0826)  Recommended Precautions and Strategies: upright posture during/after eating, small bites of food and sips of liquid, alternate between small bites of food and sips of liquid, general aspiration precautions, assist with feeding (12/24/24 0826)  SLP Rec. for Method of Medication Administration: meds whole, with thin liquids (12/24/24 0826)     Monitor for Signs of Aspiration: yes, cough, gurgly voice, throat clearing, pneumonia, notify SLP if any concerns (12/24/24 0826)  Recommended Diagnostics: reassess via clinical swallow evaluation (12/24/24 0826)     Anticipated Discharge Disposition (SLP): skilled nursing facility  (12/24/24 0826)     Therapy Frequency (Swallow): PRN (12/24/24 0826)  Predicted Duration Therapy Intervention (Days): until discharge (12/24/24 0826)  Oral Care Recommendations: Oral Care before breakfast, after meals and PRN (12/24/24 0826)        Daily Summary of Progress (SLP): progress toward functional goals as expected (12/24/24 0826)               Treatment Assessment (SLP): improved (12/24/24 0826)  Treatment Assessment Comments (SLP): see note (12/24/24 0826)  Plan for Continued Treatment (SLP): continue treatment per plan of care (12/24/24 0826)         Progress: improving      SWALLOW EVALUATION (Last 72 Hours)       SLP Adult Swallow Evaluation       Row Name 12/24/24 0826                   Rehab Evaluation    Document Type therapy note (daily note)  -MD        Subjective Information no complaints  -MD        Patient Observations alert;cooperative;agree to therapy  -MD        Patient/Family/Caregiver Comments/Observations No family present  -MD        Patient Effort excellent  -MD        Symptoms Noted During/After Treatment none  -MD           Pain    Pretreatment Pain Rating 0/10 - no pain  -MD        Posttreatment Pain Rating 0/10 - no pain  -MD           SLP Treatment Clinical Impressions    Treatment Assessment (SLP) improved  -MD        Treatment Assessment Comments (SLP) see note  -MD        Daily Summary of Progress (SLP) progress toward functional goals as expected  -MD        Plan for Continued Treatment (SLP) continue treatment per plan of care  -MD        Care Plan Review risks/benefits reviewed;current/potential barriers reviewed;patient/other agree to care plan  -MD        Care Plan Review, Other Participant(s) caregiver  -MD           Recommendations    Therapy Frequency (Swallow) PRN  -MD        Predicted Duration Therapy Intervention (Days) until discharge  -MD        SLP Diet Recommendation regular textures;thin liquids  -MD        Recommended Diagnostics reassess via clinical swallow  evaluation  -MD        Recommended Precautions and Strategies upright posture during/after eating;small bites of food and sips of liquid;alternate between small bites of food and sips of liquid;general aspiration precautions;assist with feeding  -MD        Oral Care Recommendations Oral Care before breakfast, after meals and PRN  -MD        SLP Rec. for Method of Medication Administration meds whole;with thin liquids  -MD        Monitor for Signs of Aspiration yes;cough;gurgly voice;throat clearing;pneumonia;notify SLP if any concerns  -MD        Anticipated Discharge Disposition (SLP) skilled nursing [facility]  -MD           Swallow Goals (SLP)    Swallow LTGs Swallow Long Term Goal (free text)  -MD        Swallow STGs diet tolerance goal selection (SLP)  -MD        Diet Tolerance Goal Selection (SLP) Patient will tolerate trials of  -MD           (LTG) Swallow    (LTG) Swallow Pt will tolerate least restrictive diet with no overt s/s of aspiration.  -MD        [place] (Swallow Long Term Goal) independently (over 90% accuracy)  -MD        Time Frame (Swallow Long Term Goal) by discharge  -MD        Barriers (Swallow Long Term Goal) n/a  -MD        Progress/Outcomes (Swallow Long Term Goal) good progress toward goal  -MD        Comment (Swallow Long Term Goal) n/a  -MD           (STG) Patient will tolerate trials of    Consistencies Trialed (Tolerate trials) regular textures;soft to chew (whole) textures;pureed textures;thin liquids  -MD        Desired Outcome (Tolerate trials) without signs/symptoms of aspiration;with adequate oral prep/transit/clearance  -MD        [place] (Tolerate trials) independently (over 90% accuracy)  -MD        Time Frame (Tolerate trials) by discharge  -MD        Progress/Outcomes (Tolerate trials) good progress toward goal  -MD                  User Key  (r) = Recorded By, (t) = Taken By, (c) = Cosigned By      Initials Name Effective Dates    Carolyn Dacosta, SLP 06/21/22 -                      EDUCATION  The patient has been educated in the following areas:   Dysphagia (Swallowing Impairment).        SLP GOALS       Row Name 12/24/24 0826             (LTG) Swallow    (LTG) Swallow Pt will tolerate least restrictive diet with no overt s/s of aspiration.  -MD      [place] (Swallow Long Term Goal) independently (over 90% accuracy)  -MD      Time Frame (Swallow Long Term Goal) by discharge  -MD      Barriers (Swallow Long Term Goal) n/a  -MD      Progress/Outcomes (Swallow Long Term Goal) good progress toward goal  -MD      Comment (Swallow Long Term Goal) n/a  -MD         (STG) Patient will tolerate trials of    Consistencies Trialed (Tolerate trials) regular textures;soft to chew (whole) textures;pureed textures;thin liquids  -MD      Desired Outcome (Tolerate trials) without signs/symptoms of aspiration;with adequate oral prep/transit/clearance  -MD      [place] (Tolerate trials) independently (over 90% accuracy)  -MD      Time Frame (Tolerate trials) by discharge  -MD      Progress/Outcomes (Tolerate trials) good progress toward goal  -MD                User Key  (r) = Recorded By, (t) = Taken By, (c) = Cosigned By      Initials Name Provider Type    Carolyn Dacosta, SLP Speech and Language Pathologist                         Time Calculation:    Time Calculation- SLP       Row Name 12/24/24 0950             Time Calculation- SLP    SLP Start Time 0826  -MD      SLP Stop Time 0849  -MD      SLP Time Calculation (min) 23 min  -MD      SLP Received On 12/24/24  -MD         Untimed Charges    95193-OK Treatment Swallow Minutes 23  -MD         Total Minutes    Untimed Charges Total Minutes 23  -MD       Total Minutes 23  -MD                User Key  (r) = Recorded By, (t) = Taken By, (c) = Cosigned By      Initials Name Provider Type    Carolyn Dacosta, SLP Speech and Language Pathologist                    Therapy Charges for Today       Code Description Service Date Service  Provider Modifiers Qty    [id number] HC ST TREATMENT SWALLOW 2 12/24/2024 Carolyn Quispe, SLP GN 1                 Carolyn Quispe SLP  12/24/2024

## 2024-12-24 NOTE — PLAN OF CARE
Goal Outcome Evaluation:  Plan of Care Reviewed With: patient, caregiver     Progress: improving     Anticipated Discharge Disposition (SLP): skilled nursing facility     Treatment Assessment (SLP): improved (12/24/24 0826)  Treatment Assessment Comments (SLP): see note (12/24/24 0826)  Plan for Continued Treatment (SLP): continue treatment per plan of care (12/24/24 0826)    Patient seen to address diet tolerance. Patient denied pain. Patient seen at the end of breakfast. Patient consumed cream of wheat, milk and juice for breakfast. Patient denies difficulty swallowing meal. Patient given trials of thin liquids and a regular consistency solid. Patient mastication adequate. Slight oral residue after the swallow, which the patient cleared with a thin liquid wash. Patient displayed no indication of aspiration at bedside. Patient is safe to advance to a regular diet with thin liquids. Patient would benefit from continued ST. ST will continue POC.    Carolyn Quispe, SLP 12/24/2024 09:49 CST

## 2024-12-24 NOTE — DISCHARGE SUMMARY
[facility] Medicine Services  DISCHARGE SUMMARY       Date of Admission: 12/18/2024  Date of Discharge:  12/24/2024  Primary Care Physician: Moiz Schwartz DO    Presenting Problem/History of Present Illness:  84-year-old female that presented to [facility] on 12/18 with increased confusion and fall.  She was admitted to our facility on 10/5 to 10/15/2024 for sepsis with bacteremia due to UTI, cardiopulmonary arrest and acute kidney injury.  She was again hospitalized on 11/12 to 11/18/2024 with acute kidney injury secondary to bilateral hydronephrosis.  She was discharged to [facility].  She was seen in follow-up by urology on 12/2 -patient was adamant that she wanted indwelling Smith catheter to be removed.  Smith catheter discontinued.  History of present illness is very limited as patient is confused, reportedly was discharged from nursing home approximately 3 days ago.  She was living with her daughter.  EMS was called due to increased confused and had a fall at home.     Final Discharge Diagnoses:  Active Hospital Problems    Diagnosis     **Metabolic encephalopathy     Hypokalemia     Recurrent UTI (urinary tract infection)     Essential hypertension     Anemia     Acute renal failure superimposed on stage 3 chronic kidney disease        Consults: Palliative care    Procedures Performed: None    Pertinent Test Results:   Results for orders placed during the hospital encounter of 11/12/24    Adult Transthoracic Echo Complete W/ Cont if Necessary Per Protocol    Interpretation Summary    Left ventricular ejection fraction appears to be 66 - 70%.    Left ventricular wall thickness is consistent with mild concentric hypertrophy.    Left ventricular diastolic function is consistent with (grade Ia w/high LAP) impaired relaxation.    Left atrial volume is moderately increased.    Mild pulmonary hypertension is present.      Imaging Results (All)       Procedure  Component Value Units Date/Time    MRI Brain Without Contrast [[id number]] Collected: 12/19/24 1658     Updated: 12/19/24 1704    Narrative:      MRI BRAIN WO CONTRAST- 12/19/2024 3:31 PM     HISTORY: stroke like symptoms; N17.9-Acute kidney failure, unspecified;  N39.0-Urinary tract infection, site not specified; A41.9-Sepsis,  unspecified organism; L89.90-Pressure ulcer of unspecified site,  unspecified stage; B37.2-Candidiasis of skin and nail; R41.82-Altered  mental status, unspecified; R13.10-Dysphagia, unspecified; Z74.09-Other  reduced mobility     TECHNIQUE: Multisequence, multiplanar MRI of the brain without contrast     COMPARISON: None     FINDINGS:     No diffusion signal abnormality. No intra-axial or extra-axial  hemorrhage. No intracranial mass lesion or mass effect. Mild chronic  small vessel ischemic changes. Ventricles are nondilated. Posterior  fossa structures are unremarkable. Pituitary gland and sella are  unremarkable. The major intracranial flow-voids are preserved. Orbital  contents are unremarkable. Chronic right maxillary sinus disease.  Partial right mastoid effusion. Normal marrow signal in the skull base  and calvarium.       Impression:         1.  No acute intracranial process. In particular, there is no acute  ischemia.  2.  Chronic small vessel ischemic changes.  3.  Chronic paranasal sinus disease.  4.  Partial right mastoid effusion.     This report was signed and finalized on 12/19/2024 5:01 PM by Dr Tenzin Madrigal.       US Renal Bilateral [[id number]] Collected: 12/18/24 1223     Updated: 12/18/24 1316    Addenda:        ADDENDUM: It is noted that the technologist scanned the kidneys outside  of our normal protocol scanning the left kidney first followed by the  right kidney. Therefore, the measurements of the kidneys are reversed.  The left kidney measures 10.0 cm in grme-aj-fxst length and the right  kidney 8.9 cm in friq-ew-qsyj length. Otherwise no change from the  previous  exam.     This report was signed and finalized on 12/18/2024 1:13 PM by Dr. Chi Hinds MD.     Signed: 12/18/24 1313 by Chi Hinds MD    Narrative:      RENAL ULTRASOUND COMPLETE 12/18/2024 10:57 AM     REASON FOR EXAM: prior hydronephrosis, recurrent TOMÁS; N17.9-Acute kidney  failure, unspecified; N39.0-Urinary tract infection, site not specified;  A41.9-Sepsis, unspecified organism; L89.90-Pressure ulcer of unspecified  site, unspecified stage; B37.2-Candidiasis of skin and nail;  R41.82-Altered mental status, unspecified; R13.10-Dysphagia, unspecified        COMPARISON: 11/15/2024     TECHNIQUE: Multiple longitudinal and transverse realtime sonographic  images of the kidneys and urinary bladder are obtained.  Images and  report are stored in PACS per institutional and state regulations.     FINDINGS:     RIGHT KIDNEY: 10.0 cm. Normal in size, shape, contour and position. No  solid or cystic masses. The central echo complex is compact with no  evidence for hydronephrosis. No nephrolithiasis or abnormal perinephric  fluid collections . No hydroureter.     LEFT KIDNEY: 8.9 cm. Normal in size, shape, contour and position. No  solid or cystic masses. The central echo complex is compact with no  evidence for hydronephrosis. No nephrolithiasis or abnormal perinephric  fluid collections . No hydroureter.     PELVIS: There is some debris within the bladder but with no discrete  mass. The urinary bladder is otherwise unremarkable. There is no  surrounding ascites.       Impression:      1. Debris within the bladder without evidence of discrete bladder mass  or bladder wall thickening.  2. Normal sonogram of the kidneys. No mass or hydronephrosis..           This report was signed and finalized on 12/18/2024 12:25 PM by Dr. Chi Hinds MD.       CT Cervical Spine Without Contrast [[id number]] Collected: 12/18/24 0609     Updated: 12/18/24 0718    Narrative:      EXAMINATION: CT CERVICAL SPINE WO  CONTRAST-      12/18/2024 12:01 AM     HISTORY: fall; N17.9-Acute kidney failure, unspecified; N39.0-Urinary  tract infection, site not specified; A41.9-Sepsis, unspecified organism;  L89.90-Pressure ulcer of unspecified site, unspecified stage;  B37.2-Candidiasis of skin and nail; R41.82-Altered mental status,  unspecified     In order to have a CT radiation dose as low as reasonably achievable  Automated Exposure Control was utilized for adjustment of the mA and/or  KV according to patient size.     Total DLP = 331.85 mGy.cm     The CT scan of the cervical spine is performed with delved intravenous  or intrathecal contrast enhancement.     Images are acquired in axial plane and subsequent reconstruction in  coronal and sagittal planes.     Comparison is made with the previous study dated 9/4/2023.     There is no evidence of or compression.     No acute displacement or malalignment.     There is mild retrolisthesis of C5 over C6.     The curvature is maintained.     There is severe arthropathy of the atlantodental articulation with  complete obliteration of the atlantodental space.     There are chronic degenerative changes in the form of anterior and  posterior osteophytes, uncinate spurs and facet arthropathy. There is a  resultant mild narrowing of the left neuroforamina C2-3 and C3-4,  bilateral neuroforaminal stenosis at C4-5 C5-6 and C6-7. There is mild  spinal stenosis at C5-6.     Limited visualized prevertebral soft tissues are unremarkable. Thyroid  gland is incompletely visualized and not well evaluated.     Limited visualized lung apices are unremarkable.       Impression:      1. No acute fracture or malalignment.  2. Cervical spondylosis. Mild retrolisthesis C5 over C6.  3. Multilevel prominent disc osteophyte complexes, uncinate spurs and  facet arthropathy and resultant neural foraminal and spinal canal  stenoses are detailed above.     The above study was initially reviewed and reported by  [employer]. I do not  find any discrepancies.                                            This report was signed and finalized on 12/18/2024 7:14 AM by Dr. Marty Suresh MD.       XR Chest 1 View [[id number]] Collected: 12/18/24 0649     Updated: 12/18/24 0653    Narrative:      EXAMINATION: XR CHEST 1 VW-     12/18/2024 12:35 AM     HISTORY: altered mentation; N17.9-Acute kidney failure, unspecified;  N39.0-Urinary tract infection, site not specified; A41.9-Sepsis,  unspecified organism; L89.90-Pressure ulcer of unspecified site,  unspecified stage; B37.2-Candidiasis of skin and nail; R41.82-Altered  mental status, unspecified     A frontal projection of the chest is compared with the previous study  dated 10/5/2024.     The lungs are moderately expanded, significantly improved since the  previous study.     The right lung apex is obscured by the bony and soft tissue structures  of the mandible.     There is no evidence of recent infiltrate, pleural effusion, pulmonary  congestion or pneumothorax.     Heart size is in the normal range. Atheromatous changes of the tortuous  thoracic aorta is noted.     There is deformity of the right distal clavicle representing previous  healed fracture. There is no acute bony abnormality.       Impression:      1. No active cardiopulmonary disease.        This report was signed and finalized on 12/18/2024 6:50 AM by Dr. Marty Suresh MD.       CT Head Without Contrast [[id number]] Collected: 12/18/24 0603     Updated: 12/18/24 0611    Narrative:      EXAMINATION: CT HEAD WO CONTRAST-      12/18/2024 12:01 AM     HISTORY: altered mentation/fall; N17.9-Acute kidney failure,  unspecified; N39.0-Urinary tract infection, site not specified;  A41.9-Sepsis, unspecified organism; L89.90-Pressure ulcer of unspecified  site, unspecified stage; B37.2-Candidiasis of skin and nail;  R41.82-Altered mental status, unspecified     In order to have a CT radiation dose as low as reasonably  achievable  Automated Exposure Control was utilized for adjustment of the mA and/or  KV according to patient size.     Total DLP = 852.88 mGy.cm     The CT scan of the head is performed without intravenous contrast  enhancement.     The images are acquired in axial plane and subsequent reconstruction in  coronal and sagittal planes.     Comparison is made with the previous study dated 10/5/2024.     There are significant motion artifacts which lowers the diagnostic  quality of the study. A subtle lesion may not be evaluated or excluded.     There is no evidence of a mass. There is no midline shift.     There is no evidence of intracranial hemorrhage or hematoma.     There is moderate dilatation of the ventricles, basal cisterns and the  cortical sulci suggestive chronic volume loss, similar to the previous  study.     Scattered areas of chronic white matter ischemia bilaterally noted. The  gray-white matter differentiation is maintained.     Very limited visualized posterior fossa structures are grossly  unremarkable.     Images reviewed in bone window show no acute displaced or depressed  skull fracture. A subtle nondisplaced fracture may be obscured due to  significant motion artifact. There is opacification of the right  maxillary antrum similar to the previous study representing chronic  inflammatory process. Mastoid air cells are clear.       Impression:      1. Significant motion artifacts may obscure a subtle intracranial  abnormality or lesion. No obvious/visible acute intracranial  abnormality. If clinically warranted further evaluation with repeat CT  scan of the head or MR imaging of the brain when the patient is able to  cooperate.  2. Chronic ischemic and atrophic changes.     The above study was initially reviewed and reported by [facility]. I do not  find any discrepancies.                                            This report was signed and finalized on 12/18/2024 6:08 AM by Dr. Marty Suresh,  MD.             LAB RESULTS:      Lab 12/24/24  0320 12/21/24  0959 12/20/24  0411 12/19/24  1214 12/19/24  0311 12/18/24  0102   WBC 6.38  --  3.93  --  5.01 9.85   HEMOGLOBIN 8.6* 8.7* 7.7* 7.6* 7.5* 10.4*   HEMATOCRIT 27.6* 27.2* 24.4* 23.9* 24.1* 32.6*   PLATELETS 146  --  118*  --  156 260   NEUTROS ABS 3.46  --   --   --  3.04 7.27*   IMMATURE GRANS (ABS) 0.04  --   --   --  0.02 0.04   LYMPHS ABS 2.22  --   --   --  1.46 1.88   MONOS ABS 0.52  --   --   --  0.33 0.60   EOS ABS 0.12  --   --   --  0.15 0.03   MCV 94.8  --  94.6  --  96.4 91.3   LACTATE  --   --   --  0.6  --  2.7*   PROTIME  --   --   --   --   --  26.8*   APTT  --   --   --   --   --  38.9*         Lab 12/24/24  0320 12/23/24  1611 12/21/24  0959 12/20/24  0411 12/19/24  1214 12/18/24  1242 12/18/24  0102   SODIUM 141 140 141 145 144   < > 138   POTASSIUM 4.1 4.3 3.4* 4.8 3.2*   < > 3.4*   CHLORIDE 113* 115* 115* 122* 118*   < > 103   CO2 18.0* 18.0* 18.0* 15.0* 15.0*   < > 15.0*   ANION GAP 10.0 7.0 8.0 8.0 11.0   < > 20.0*   BUN 17 16 17 27* 35*   < > 53*   CREATININE 1.31* 1.17* 1.34* 1.67* 2.05*   < > 2.95*   EGFR 40.8* 46.7* 39.7* 30.5* 23.8*   < > 15.4*   GLUCOSE 99 150* 126* 82 106*   < > 119*   CALCIUM 9.1 9.0 9.0 8.3* 8.4*   < > 9.4   MAGNESIUM  --   --   --   --   --   --  2.0    < > = values in this interval not displayed.         Lab 12/24/24  0320 12/18/24  0102   TOTAL PROTEIN 5.7* 7.8   ALBUMIN 2.8* 3.8   GLOBULIN 2.9 4.0   ALT (SGPT) <5 5   AST (SGOT) 7 14   BILIRUBIN 0.2 0.2   ALK PHOS 61 89   LIPASE  --  20         Lab 12/18/24  0102   PROTIME 26.8*   INR 2.36*                 Brief Urine Lab Results  (Last result in the past 365 days)        Color   Clarity   Blood   Leuk Est   Nitrite   Protein   CREAT   Urine HCG        12/18/24 0102 Yellow   Turbid   Trace   Large (3+)   Positive   30 mg/dL (1+)                 Microbiology Results (last 10 days)       Procedure Component Value - Date/Time    Blood Culture - Blood, Arm,  Right [[id number]]  (Normal) Collected: 12/18/24 0110    Lab Status: Final result Specimen: Blood from Arm, Right Updated: 12/23/24 0215     Blood Culture No growth at 5 days    COVID PRE-OP / PRE-PROCEDURE SCREENING ORDER (NO ISOLATION) - Swab, Nasal Cavity [[id number]]  (Normal) Collected: 12/18/24 0102    Lab Status: Final result Specimen: Swab from Nasal Cavity Updated: 12/18/24 0132    Narrative:      The following orders were created for panel order COVID PRE-OP / PRE-PROCEDURE SCREENING ORDER (NO ISOLATION) - Swab, Nasal Cavity.  Procedure                               Abnormality         Status                     ---------                               -----------         ------                     COVID-19 and FLU A/B PCR...[[id number]]  Normal              Final result                 Please view results for these tests on the individual orders.    COVID-19 and FLU A/B PCR, 1 HR TAT - Swab, Nasopharynx [[id number]]  (Normal) Collected: 12/18/24 0102    Lab Status: Final result Specimen: Swab from Nasopharynx Updated: 12/18/24 0132     COVID19 Not Detected     Influenza A PCR Not Detected     Influenza B PCR Not Detected    Narrative:      Fact sheet for providers: https://www.fda.gov/media/240929/download    Fact sheet for patients: https://www.fda.gov/media/120207/download    Test performed by PCR.    Urine Culture - Urine, Straight Cath [[id number]]  (Abnormal)  (Susceptibility) Collected: 12/18/24 0102    Lab Status: Final result Specimen: Urine from Straight Cath Updated: 12/20/24 0943     Urine Culture >100,000 CFU/mL Escherichia coli    Narrative:      Colonization of the urinary tract without infection is common. Treatment is discouraged unless the patient is symptomatic, pregnant, or undergoing an invasive urologic procedure.    Susceptibility        Escherichia coli      CHULA      Amoxicillin + Clavulanate Susceptible      Ampicillin Susceptible      Ampicillin + Sulbactam Susceptible      Cefazolin  (Urine) Susceptible      Cefepime Susceptible      Ceftazidime Susceptible      Ceftriaxone Susceptible      Gentamicin Susceptible      Levofloxacin Susceptible      Nitrofurantoin Susceptible      Piperacillin + Tazobactam Susceptible      Trimethoprim + Sulfamethoxazole Susceptible                           Blood Culture - Blood, Arm, Right [[id number]]  (Normal) Collected: 12/18/24 0102    Lab Status: Final result Specimen: Blood from Arm, Right Updated: 12/23/24 0215     Blood Culture No growth at 5 days            Hospital Course:  In the emergency department, she was found to have acute kidney injury with creatinine 2.95.  Urinalysis concerning for UTI-she was treated with Rocephin.  CT head showed no obvious/visible acute intracranial abnormality.  Chest x-ray showed no acute findings.  Flu/COVID-negative.  She was admitted for medical management.  She continued antibiotic treatment with ceftriaxone.  Urine culture showed growth of E. coli, pansensitive.  Recommended to continue antibiotic treatment for 7 days total.  She will be discharged on oral antibiotics for 3 more days.  Blood cultures were negative.  Regarding renal function, her initial creatinine was 2.95, gradually improved with management, on discharge 1.31, back to her baseline.  Patient had potassium supplementation due to hypokalemia.  Potassium on discharge was 4.1.  The patient had a Smith catheter placed during this hospital stay, but requested it to be removed. A renal ultrasound performed 12/18/2024 showed debris in the bladder, no masses.  And normal appearance of the kidneys.  No hydronephrosis.  Patient was initially hypotensive, and antihypertensive medications were placed on hold.  Then blood pressure normalized and medications were restarted.  Her mental status, on the day of discharge patient was alert oriented x 3, and aware of plan for discharge to skilled nursing facility.  Her initial hemoglobin was 10.4, was down to 7.5.   "Lovenox was placed on hold.  The patient did not require transfusion of blood products.  Her hemoglobin on discharge was 8.6.  She did not have evident episodes of bleeding.  An MRI of the brain without contrast performed 12/19/2024 was negative for acute ischemic changes.  She was evaluated by palliative care, and she decided to be on a DO NOT RESUSCITATE status.  A MOST form/document was initiated and signed by myself.  Arrangements were made for placement at [facility] and patient was discharged.  Recommend to follow in the outpatient setting with primary care provider and with urology given chronic incontinence and recurrent tract infections.      Physical Exam on Discharge:  /59 (BP Location: Right arm, Patient Position: Lying)   Pulse 62   Temp 98.2 °F (36.8 °C) (Oral)   Resp 14   Ht 157.5 cm (62\")   Wt 65.5 kg (144 lb 6.4 oz)   LMP  (LMP Unknown)   SpO2 99%   BMI 26.41 kg/m²   Physical Exam  Constitutional:       Appearance: Normal appearance.  Alert oriented x 3.  No respiratory distress  HENT:      Head: Normocephalic and atraumatic.      Nose: Nose normal.      Mouth/Throat:      Mouth: Mucous membranes are moist.   Eyes:      Extraocular Movements: Extraocular movements intact.      Conjunctiva/sclera: Conjunctivae normal.   Cardiovascular:      Rate and Rhythm: Normal rate and regular rhythm.      Pulses: Normal pulses.   Pulmonary:      Effort: No respiratory distress.      Breath sounds: Normal breath sounds. No wheezing, rhonchi or rales.   Abdominal:      General: Abdomen is flat. Bowel sounds are normal.      Palpations: Abdomen is soft. le.   Extremities:  No lower extremity edema.  Skin:     Capillary Refill: Capillary refill takes less than 2 seconds.      Coloration: Skin is not jaundiced.   Neurological:      General: No focal deficit present.      Mental Status: Patient is alert, oriented to place time and person.      Condition on Discharge: " Stable    Discharge Disposition:  Skilled Nursing Facility (DC - External)    Discharge Medications:     Discharge Medications        New Medications        Instructions Start Date   hydrocortisone-bacitracin-zinc oxide-nystatin  Commonly known as: MAGIC BARRIER   1 Application, Topical, 4 Times Daily             Continue These Medications        Instructions Start Date   acetaminophen 325 MG tablet  Commonly known as: TYLENOL   650 mg, Every 6 Hours PRN      amLODIPine 5 MG tablet  Commonly known as: NORVASC   5 mg, Oral, Every 24 Hours Scheduled      atorvastatin 40 MG tablet  Commonly known as: LIPITOR   40 mg, Daily      butalbital-acetaminophen-caffeine -40 MG per tablet  Commonly known as: FIORICET, ESGIC   1 tablet, Oral, 2 Times Daily      carvedilol 3.125 MG tablet  Commonly known as: COREG   3.125 mg, Oral, 2 Times Daily With Meals      donepezil 10 MG tablet  Commonly known as: ARICEPT   10 mg, Nightly      ergocalciferol 1.25 MG (74885 UT) capsule  Commonly known as: ERGOCALCIFEROL   50,000 Units, Weekly      lansoprazole 30 MG capsule  Commonly known as: PREVACID   30 mg, Daily      levothyroxine 50 MCG tablet  Commonly known as: SYNTHROID, LEVOTHROID   50 mcg, Daily      melatonin 5 MG tablet tablet   10 mg, Oral, Nightly PRN      naloxone 4 MG/0.1ML nasal spray  Commonly known as: NARCAN   1 spray, Nasal, As Needed      sertraline 25 MG tablet  Commonly known as: ZOLOFT   25 mg, Daily      SUMAtriptan 50 MG tablet  Commonly known as: IMITREX   50 mg, Oral, Daily PRN             Stop These Medications      oxyCODONE 15 MG immediate release tablet  Commonly known as: ROXICODONE     QUEtiapine 25 MG tablet  Commonly known as: SEROquel              Discharge Diet:   Diet Instructions       Diet: Cardiac Diets; Low Sodium (2g); Regular (IDDSI 7); Thin (IDDSI 0)      Discharge Diet: Cardiac Diets    Cardiac Diet: Low Sodium (2g)    Texture: Regular (IDDSI 7)    Fluid Consistency: Thin (IDDSI 0)             Activity at Discharge: As tolerated    Follow-up Appointments:   No future appointments.    Test Results Pending at Discharge: None    Electronically signed by Cuba Espitia MD, 12/24/24, 11:34 CST.    Time: 50 minutes.         Yes

## 2024-12-25 NOTE — THERAPY DISCHARGE NOTE
Acute Care - Physical Therapy Discharge Summary  [facility]       Patient Name: Jennifer Gomes  : 1940  MRN: [id number]    Today's Date: 2024                 Admit Date: 2024      PT Recommendation and Plan    Visit Dx:    ICD-10-CM ICD-9-CM   1. TOMÁS (acute kidney injury)  N17.9 584.9   2. Acute UTI  N39.0 599.0   3. Sepsis, due to unspecified organism, unspecified whether acute organ dysfunction present  A41.9 038.9     995.91   4. Pressure injury of skin, unspecified injury stage, unspecified location  L89.90 707.00     707.20   5. Candidal intertrigo  B37.2 112.3   6. Altered mental status, unspecified altered mental status type  R41.82 780.97   7. Dysphagia, unspecified type [R13.10]  R13.10 787.20   8. Impaired mobility [Z74.09]  Z74.09 799.89   9. Migraine without aura and without status migrainosus, not intractable  G43.009 346.10        Outcome Measures       Row Name 24 1100             How much help from another person do you currently need...    Turning from your back to your side while in flat bed without using bedrails? 4  -CAITLYN      Moving from lying on back to sitting on the side of a flat bed without bedrails? 4  -CAITLYN      Moving to and from a bed to a chair (including a wheelchair)? 3  -CAITLYN      Standing up from a chair using your arms (e.g., wheelchair, bedside chair)? 3  -CAITLYN      Climbing 3-5 steps with a railing? 2  -CAITLYN      To walk in hospital room? 1  -CAITLYN      AM-PAC 6 Clicks Score (PT) 17  -CAITLYN                User Key  (r) = Recorded By, (t) = Taken By, (c) = Cosigned By      Initials Name Provider Type    Daniella Carter PTA Physical Therapist Assistant                         PT Rehab Goals       Row Name 24 0740             Bed Mobility Goal 1 (PT)    Activity/Assistive Device (Bed Mobility Goal 1, PT) sit to supine/supine to sit  -OUSMANE      [place] Level/Cues Needed (Bed Mobility Goal 1, PT) moderate assist (50-74% patient effort)  -OUSMANE      Time Frame  (Bed Mobility Goal 1, PT) long term goal (LTG);10 days  -OUSMANE      Progress/Outcomes (Bed Mobility Goal 1, PT) goal not met  -OUSMANE         Transfer Goal 1 (PT)    Activity/Assistive Device (Transfer Goal 1, PT) sit-to-stand/stand-to-sit;bed-to-chair/chair-to-bed  -OUSMANE      [place] Level/Cues Needed (Transfer Goal 1, PT) moderate assist (50-74% patient effort)  -OUSMANE      Time Frame (Transfer Goal 1, PT) long term goal (LTG);10 days  -OUSMANE      Progress/Outcome (Transfer Goal 1, PT) goal not met  -OUSMANE                User Key  (r) = Recorded By, (t) = Taken By, (c) = Cosigned By      Initials Name Provider Type Discipline    Sadi Sunshine, PTA Physical Therapist Assistant PT                        PT Discharge Summary  Anticipated Discharge Disposition (PT): skilled nursing facility  Reason for Discharge: Discharge from facility  Outcomes Achieved: Refer to plan of care for updates on goals achieved  Discharge Destination: SNF      Sadi Shah PTA   12/25/2024

## 2024-12-26 NOTE — THERAPY DISCHARGE NOTE
Acute Care - Speech Language Pathology Discharge Summary  [facility]       Patient Name: Jennifer Gomes  : 1940  MRN: [id number]    Today's Date: 2024                   Admit Date: 2024      SLP Recommendation and Plan  Regular solids and thin liquids    Visit Dx:    ICD-10-CM ICD-9-CM   1. TOMÁS (acute kidney injury)  N17.9 584.9   2. Acute UTI  N39.0 599.0   3. Sepsis, due to unspecified organism, unspecified whether acute organ dysfunction present  A41.9 038.9     995.91   4. Pressure injury of skin, unspecified injury stage, unspecified location  L89.90 707.00     707.20   5. Candidal intertrigo  B37.2 112.3   6. Altered mental status, unspecified altered mental status type  R41.82 780.97   7. Dysphagia, unspecified type [R13.10]  R13.10 787.20   8. Impaired mobility [Z74.09]  Z74.09 799.89   9. Migraine without aura and without status migrainosus, not intractable  G43.009 346.10                SLP GOALS       Row Name 24 1000 24 0826          (LTG) Swallow    (LTG) Swallow Pt will tolerate least restrictive diet with no overt s/s of aspiration.  -MB Pt will tolerate least restrictive diet with no overt s/s of aspiration.  -MD     [place] (Swallow Long Term Goal) independently (over 90% accuracy)  -MB independently (over 90% accuracy)  -MD     Time Frame (Swallow Long Term Goal) by discharge  -MB by discharge  -MD     Barriers (Swallow Long Term Goal) n/a  -MB n/a  -MD     Progress/Outcomes (Swallow Long Term Goal) goal partially met  -MB good progress toward goal  -MD     Comment (Swallow Long Term Goal) n/a  -MB n/a  -MD        (STG) Patient will tolerate trials of    Consistencies Trialed (Tolerate trials) regular textures;soft to chew (whole) textures;pureed textures;thin liquids  -MB regular textures;soft to chew (whole) textures;pureed textures;thin liquids  -MD     Desired Outcome (Tolerate trials) without signs/symptoms of aspiration;with adequate oral  prep/transit/clearance  -MB without signs/symptoms of aspiration;with adequate oral prep/transit/clearance  -MD     [place] (Tolerate trials) independently (over 90% accuracy)  -MB independently (over 90% accuracy)  -MD     Time Frame (Tolerate trials) by discharge  -MB by discharge  -MD     Progress/Outcomes (Tolerate trials) goal partially met  -MB good progress toward goal  -MD     Comment (Tolerate trials) n/a  -MB --               User Key  (r) = Recorded By, (t) = Taken By, (c) = Cosigned By      Initials Name Provider Type    Serena Gan, CCC-SLP Speech and Language Pathologist    Carolyn Dacosta, SLP Speech and Language Pathologist                    [email or web address]@      SLP Discharge Summary  Anticipated Discharge Disposition (SLP): skilled nursing facility  Reason for Discharge: discharge from this facility  Progress Toward Achieving Short/long Term Goals: goals partially met within established timelines  Discharge Destination: SNF      Serena Vasquez CCC-SLP  12/26/2024

## 2024-12-26 NOTE — THERAPY DISCHARGE NOTE
Acute Care - Occupational Therapy Discharge Summary  [facility]     Patient Name: Jennifer Gomes  : 1940  MRN: [id number]    Today's Date: 2024                 Admit Date: 2024        OT Recommendation and Plan    Visit Dx:    ICD-10-CM ICD-9-CM   1. TOMÁS (acute kidney injury)  N17.9 584.9   2. Acute UTI  N39.0 599.0   3. Sepsis, due to unspecified organism, unspecified whether acute organ dysfunction present  A41.9 038.9     995.91   4. Pressure injury of skin, unspecified injury stage, unspecified location  L89.90 707.00     707.20   5. Candidal intertrigo  B37.2 112.3   6. Altered mental status, unspecified altered mental status type  R41.82 780.97   7. Dysphagia, unspecified type [R13.10]  R13.10 787.20   8. Impaired mobility [Z74.09]  Z74.09 799.89   9. Migraine without aura and without status migrainosus, not intractable  G43.009 346.10                OT Rehab Goals       Row Name 24 1300             Transfer Goal 1 (OT)    Activity/Assistive Device (Transfer Goal 1, OT) commode, bedside without drop arms  -JW      [place] Level/Cues Needed (Transfer Goal 1, OT) minimum assist (75% or more patient effort)  -JW      Time Frame (Transfer Goal 1, OT) long term goal (LTG);by discharge  -      Progress/Outcome (Transfer Goal 1, OT) goal not met;discharged from facility  -JW         Toileting Goal 1 (OT)    Activity/Device (Toileting Goal 1, OT) toileting skills, all  -JW      [place] Level/Cues Needed (Toileting Goal 1, OT) minimum assist (75% or more patient effort)  -JW      Time Frame (Toileting Goal 1, OT) long term goal (LTG);by discharge  -      Progress/Outcome (Toileting Goal 1, OT) goal not met;discharged from facility  -JW         Self-Feeding Goal 1 (OT)    Activity/Device (Self-Feeding Goal 1, OT) self-feeding skills, all  -JW      [place] Level/Cues Needed (Self-Feeding Goal 1, OT) independent  -JW      Time Frame (Self-Feeding Goal 1, OT) long term goal  (LTG);by discharge  -BESSY      Progress/Outcomes (Self-Feeding Goal 1, OT) goal not met;discharged from facility  -BESSY                User Key  (r) = Recorded By, (t) = Taken By, (c) = Cosigned By      Initials Name Provider Type Discipline    Blanka [place], OTR/L, CSRS Occupational Therapist OT                                OT Discharge Summary  Anticipated Discharge Disposition (OT): skilled nursing facility  Reason for Discharge: Discharge from facility  Outcomes Achieved: Refer to plan of care for updates on goals achieved  Discharge Destination: SNF      ZEESHAN Richardson/L, CSRS  12/26/2024

## 2025-01-10 NOTE — TELEPHONE ENCOUNTER
Patient mother is requesting a callback in regard to formula. Mother stating the [facility] office for patient will only cover the Similac and not the Enfamil.    She does have an iphone is she needs to get [employer].

## 2025-01-28 RX ORDER — AMLODIPINE BESYLATE 5 MG/1
5 TABLET ORAL DAILY
Qty: 30 TABLET | Refills: 0 | OUTPATIENT
Start: 2025-01-28

## 2025-03-25 ENCOUNTER — OFFICE VISIT (OUTPATIENT)
Age: 85
End: 2025-03-25
Payer: MEDICARE

## 2025-03-25 VITALS
SYSTOLIC BLOOD PRESSURE: 144 MMHG | HEIGHT: 62 IN | BODY MASS INDEX: 24.8 KG/M2 | WEIGHT: 134.8 LBS | DIASTOLIC BLOOD PRESSURE: 86 MMHG

## 2025-03-25 DIAGNOSIS — R23.2 HOT FLASHES: Primary | ICD-10-CM

## 2025-03-25 DIAGNOSIS — Z78.9 NON-SMOKER: ICD-10-CM

## 2025-03-25 DIAGNOSIS — E03.9 HYPOTHYROIDISM, UNSPECIFIED TYPE: ICD-10-CM

## 2025-03-25 DIAGNOSIS — I10 HTN (HYPERTENSION), BENIGN: ICD-10-CM

## 2025-03-25 DIAGNOSIS — E78.5 HYPERLIPIDEMIA, UNSPECIFIED HYPERLIPIDEMIA TYPE: ICD-10-CM

## 2025-03-25 RX ORDER — OXYCODONE HYDROCHLORIDE 15 MG/1
TABLET ORAL
COMMUNITY
Start: 2025-03-18

## 2025-03-25 RX ORDER — GABAPENTIN 300 MG/1
CAPSULE ORAL
COMMUNITY
Start: 2025-03-18

## 2025-03-25 RX ORDER — BUTALBITAL, ACETAMINOPHEN AND CAFFEINE 50; 325; 40 MG/1; MG/1; MG/1
1 TABLET ORAL EVERY 12 HOURS SCHEDULED
COMMUNITY
Start: 2025-03-18

## 2025-03-25 RX ORDER — CYCLOBENZAPRINE HCL 10 MG
1 TABLET ORAL EVERY 12 HOURS SCHEDULED
COMMUNITY
Start: 2025-03-17

## 2025-03-25 NOTE — PROGRESS NOTES
"Chief Complaint  Menopause (Pt here as self referring new gyn for menopausal symptoms, she c/o of hot flashes every hour, she reports this has worsened over the last 6 months, she has taken herself off most her medicine)    Subjective        Jennifer Gomes presents to [facility]  History of Present Illness    History of Present Illness  The patient is an 84-year-old female who presents to discuss hot flashes.    She reports experiencing anterior chest pain, which radiates to her head, inducing profuse sweating on her lips and scalp, resulting in wet hair. These episodes have been occurring for approximately a year. She has not experienced any hot flashes for several years until the onset of these symptoms last year. She does not believe these symptoms are hormonally related. She does not have a regular primary care physician. She spends most of her time either in bed or seated, engaging in activities such as watching television, reading, or needlework. She describes her life as limited and expresses a desire to pass away peacefully in her sleep. She was previously on hormone replacement therapy for over a year during her menopausal transition, which effectively managed her hot flashes and night sweats.    She is currently on carvedilol for hypertension.    She is on cholesterol medication.    She is on Synthroid.    SOCIAL HISTORY  She does not smoke.    MEDICATIONS  Current: Zoloft, sumatriptan, venlafaxine, vitamin D, Xanax, carvedilol, Synthroid.  Discontinued: Hormone replacement therapy.    Objective   Vital Signs:  /86 (BP Location: Right arm, Patient Position: Sitting, Cuff Size: Adult)   Ht 157.5 cm (62.01\")   Wt 61.1 kg (134 lb 12.8 oz)   BMI 24.65 kg/m²   Estimated body mass index is 24.65 kg/m² as calculated from the following:    Height as of this encounter: 157.5 cm (62.01\").    Weight as of this encounter: 61.1 kg (134 lb 12.8 oz).    BMI is within normal parameters. No " other follow-up for BMI required.      Physical Exam  Constitutional:       General: She is not in acute distress.     Appearance: Normal appearance. She is not toxic-appearing.   HENT:      Head: Normocephalic and atraumatic.      Nose: Nose normal.   Neurological:      General: No focal deficit present.      Mental Status: She is alert.   Psychiatric:         Mood and Affect: Mood normal.         Behavior: Behavior normal.         Thought Content: Thought content normal.         Judgment: Judgment normal.        Result Review :                Assessment and Plan   Diagnoses and all orders for this visit:    1. Hot flashes (Primary)  Comments:  Unlikely due to ERT due to her age  Orders:  -     T3, Free  -     T3, Uptake  -     T4, Free  -     TSH    2. HTN (hypertension), benign  -     Ambulatory Referral to Family Practice    3. Hyperlipidemia, unspecified hyperlipidemia type  -     Ambulatory Referral to Family Practice    4. Non-smoker    5. Hypothyroidism, unspecified type  -     T3, Free  -     T3, Uptake  -     T4, Free  -     TSH  -     Ambulatory Referral to Family Practice        Assessment & Plan  1. Hot flashes.  The etiology of the hot flashes is uncertain, but it is unlikely to be hormonal given her age and the fact that she has not experienced them for many years. A thyroid panel will be ordered to rule out any thyroid-related causes. She will be referred to a primary care physician for further evaluation and management. The lab results will be communicated to her upon receipt.    2. Hypertension.  Her blood pressure is slightly elevated today, which may be due to the stress of the visit. She is currently taking carvedilol for blood pressure management.    3. Hypercholesterolemia.  She is currently on medication for cholesterol management.    4. Thyroid management.  She is on Synthroid. Thyroid panel will be ordered.         Follow Up   No follow-ups on file.  Patient was given instructions and  counseling regarding her condition or for health maintenance advice. Please see specific information pulled into the AVS if appropriate.         Marvin Harris MD    Patient or patient representative verbalized consent for the use of Ambient Listening during the visit with  Marvin Harris MD for chart documentation. 3/25/2025  14:23 CDT

## 2025-03-26 LAB
T3FREE SERPL-MCNC: 2 PG/ML (ref 2–4.4)
T3RU NFR SERPL: 28 % (ref 24–39)
T4 FREE SERPL-MCNC: 1.01 NG/DL (ref 0.82–1.77)
TSH SERPL DL<=0.005 MIU/L-ACNC: 5.98 UIU/ML (ref 0.45–4.5)

## 2025-03-28 PROBLEM — Z79.899 POLYPHARMACY: Status: RESOLVED | Noted: 2023-09-24 | Resolved: 2025-03-28

## 2025-04-01 ENCOUNTER — TELEPHONE (OUTPATIENT)
Dept: FAMILY MEDICINE CLINIC | Facility: CLINIC | Age: 85
End: 2025-04-01

## 2025-04-04 NOTE — PROGRESS NOTES
" Katy Leone,   [facility]   Family Medicine  [address]  [place] [address]  Phone: [phone]  Fax: [phone]         Chief Complaint:  Chief Complaint   Patient presents with    Establish Care        History:  Jennifer Gomes is a 84 y.o. female who presents today as a new patient to the clinic.     Patient daughter lives with her and her , as her caregiver.  Patient is generally bedridden, using rollator/walker to transfer, but unable to get around for even a short amount of time.  Cooks most of her meals, states she still enjoys food, but \"eats like a bird\".    Multiple hospitalizations at the end of last year.  10/5/24-10/15/24 for sepsis with bacteremia due to UTI,  cardiopulmonary arrest and TOMÁS. 11/12-11/18/24 with TOMÁS due to bilateral hydronephrosis.  Also needing transfusion for anemia.  Had been discharged to the nursing home, but was then noted to be living with her daughter at most recent hospitalization 12/18 through 12/24/24.  She was discharged to [facility] skilled nursing facility.  Reports limited benefit while there, and returned home.    Hypothyroid  Recent TSH slightly elevated at 5.98. Currently on synthroid 50mcg. Had noticed some recent hot flashes as saw GYN- who do not feel these are hormone related.     Follows with pain management-Dr. Garcia, who prescribes Fioricet for migraines, along with oxycodone and gabapentin for fibromyalgia.  Daughter reports she has been in bed most of the week.  She ebbs and flows with her mood/pain.  Often times she goes weeks without coming out of her room, only for meals.  She finds that sometimes even when she is talking to patient, she will fall asleep at the drop of a hat.  She does not take Flexeril or gabapentin regularly, but has recently started taking oxycodone more regularly.  Same with Zoloft, only taking medications here and there.    HPI        Past Medical History:   Past Medical History:   Diagnosis " Date    Anxiety     Arthritis     Bronchitis     Cancer     uterine    Chronic pain     Depression     Disease of thyroid gland     Fibromyalgia     GERD (gastroesophageal reflux disease)     Headache     History of transfusion     AS     Hyperlipidemia     Hypertension     Incontinence     Insomnia     Leg pain     Lumbar stenosis     Migraines     Peptic ulcer     Restless legs     Sleep apnea     NO C-PAP    UTI (urinary tract infection)     Vaginal bleeding        Past Surgical History:   Past Surgical History:   Procedure Laterality Date    BLADDER REPAIR      MESH HAD TO BE REMOVED IN 2013    BREAST BIOPSY Right 2017    benign    BREAST CYST EXCISION Left 1982    CARDIAC CATHETERIZATION      CARPAL TUNNEL RELEASE      CATARACT EXTRACTION W/ INTRAOCULAR LENS  IMPLANT, BILATERAL      CHOLECYSTECTOMY WITH INTRAOPERATIVE CHOLANGIOGRAM N/A 2023    Procedure: CHOLECYSTECTOMY LAPAROSCOPIC INTRAOPERATIVE CHOLANGIOGRAM;  Surgeon: Gerardo Arciniega MD;  Location: [facility] OR;  Service: General;  Laterality: N/A;    COLONOSCOPY      COLONOSCOPY N/A 10/01/2021    Procedure: COLONOSCOPY WITH ANESTHESIA;  Surgeon: Tom Velasco DO;  Location: [facility] ENDOSCOPY;  Service: Gastroenterology;  Laterality: N/A;  pre: change in bowel habits  post: diverticulosis. hemorrhoids.   Olivia Mora, APRN        CYSTECTOMY      D & C HYSTEROSCOPY N/A 2017    Procedure: DILATATION AND CURETTAGE HYSTEROSCOPY;  Surgeon: Shasta Madrigla MD;  Location: [facility] OR;  Service:     DILATION AND CURETTAGE, DIAGNOSTIC / THERAPEUTIC  2008    ENDOSCOPY  2010    Short segment of Arriola's,Moderate chroninc esophagogastritis and negative H.pylori    ENDOSCOPY N/A 2017    Procedure: ESOPHAGOGASTRODUODENOSCOPY WITH ANESTHESIA;  Surgeon: Tom Velasco DO;  Location: [facility] ENDOSCOPY;  Service:     EYE SURGERY      RETINA    HEMORRHOIDECTOMY SIGMOIDOSCOPY N/A 3/21/2023    Procedure: HEMORRHOIDECTOMY WITH EXAM  UNDER ANESTHESIA;  Surgeon: Holly Chavez MD;  Location:  [facility] OR;  Service: General;  Laterality: N/A;    HYSTERECTOMY  12/20/2017    ORIF TIBIA/FIBULA FRACTURES Left 2000    TRANSVAGINAL TAPING SUSPENSION N/A 11/06/2017    Procedure: VAGINAL MESH REVISION;  Surgeon: Shasta Madrigal MD;  Location:  [facility] OR;  Service:     VAGINAL MESH REVISION  2013       Family History:   Family History   Problem Relation Age of Onset    Diabetes Mother     Multiple myeloma Mother     Stroke Father     Diabetes Sister     Prostate cancer Brother     Lymphoma Brother         NHL    Ovarian cancer Paternal Aunt     Cancer Paternal Grandmother         metastatic    Lung cancer Paternal Grandfather     Colon cancer Neg Hx     Esophageal cancer Neg Hx     Breast cancer Neg Hx        Social History:    reports that she has never smoked. She has never used smokeless tobacco. She reports that she does not currently use alcohol. She reports that she does not use drugs.    Current Medications:   Current Outpatient Medications   Medication Instructions    acetaminophen (TYLENOL) 650 mg, Every 6 Hours PRN    amLODIPine (NORVASC) 5 mg, Oral, Every 24 Hours Scheduled    atorvastatin (LIPITOR) 40 mg, Daily    butalbital-acetaminophen-caffeine (FIORICET, ESGIC) -40 MG per tablet 1 tablet, Every 12 Hours Scheduled    carvedilol (COREG) 3.125 mg, Oral, 2 Times Daily With Meals    cyclobenzaprine (FLEXERIL) 10 MG tablet 1 tablet, Every 12 Hours Scheduled    donepezil (ARICEPT) 10 mg, Nightly    ergocalciferol (ERGOCALCIFEROL) 50,000 Units, Weekly    gabapentin (NEURONTIN) 300 MG capsule TAKE 1 CAPSULE BY MOUTH DAILY FOR 2 WEEKS, AND THEN TAKE 1 CAPSULE BY MOUTH TWICE DAILY THEREAFTER.    lansoprazole (PREVACID) 30 mg, Daily    levothyroxine (SYNTHROID, LEVOTHROID) 50 mcg, Daily    naloxone (NARCAN) 4 MG/0.1ML nasal spray 1 spray, As Needed    oxyCODONE (ROXICODONE) 15 MG immediate release tablet TAKE 1 TABLET BY MOUTH 3 TIMES A DAY.  "MUST LAST 30 DAYS.    sertraline (ZOLOFT) 25 mg, Oral, Daily       Allergies:   Allergies   Allergen Reactions    Ropinirole Hcl Shortness Of Breath    Codeine Itching and Mental Status Change    Definity [Perflutren Lipid Microsphere] Other (See Comments)     Severe back pain    Ambien [Zolpidem] Other (See Comments)     HYPER     Eszopiclone Other (See Comments)     MAKES PT HYPER     Pregabalin Dizziness    Tizanidine Other (See Comments)     Terrible nightmares       OBJECTIVE:  /80   Pulse 82   Temp 96.9 °F (36.1 °C)   Ht 157.5 cm (62\")   Wt 60.8 kg (134 lb)   LMP  (LMP Unknown)   SpO2 96%   BMI 24.51 kg/m²      Gen: No acute distress.   Head and neck: Normocephalic, atraumatic.  HEENT: PERRLA, EOMI.  CV: Well perfused, no pallor.   Resp: Normal respiratory effort. No distress.   Musculoskeletal: No gross deformity.   Neuro: Alert and oriented.   Skin: No visible rash or erythema.   Psych: Appropriate.       Procedures    Assessment/Plan:     Diagnoses and all orders for this visit:    1. Hypothyroidism (acquired) (Primary)  Currently on 50 mcg of Synthroid which she takes irregularly.  Recent TSH was slightly elevated. encourage compliance, and plan to recheck TSH in 3 months.    2. Essential hypertension  Continue current regimen of amlodipine, carvedilol.  -     Comprehensive Metabolic Panel; Future  -     Lipid Panel; Future  -     CBC Auto Differential; Future  -     amLODIPine (NORVASC) 5 MG tablet; Take 1 tablet by mouth Daily.    3. Dementia, unspecified dementia severity, unspecified dementia type, unspecified whether behavioral, psychotic, or mood disturbance or anxiety  4. Major depressive disorder, recurrent episode, moderate degree  7. Polypharmacy  Extensive discussion today regarding medication, mood/dementia.  Patient and daughter have a up-and-down relationship.  Daughter often times get frustrated with patient due to her difficulty with her memory.  Discussed coping mechanisms, " "how to change the subject, avoid using the terms \"do you remember\".  Patient states she wishes to be more active with others, encouraged getting into a elderly support group.  Encouraged Zoloft daily, with potential to titrate if limited benefit.  Also discussed sedation concerns with gabapentin, Flexeril, oxycodone's.  She does states she only takes the medicines intermittently, but believe these are playing into her mood/fatigue.  -     sertraline (ZOLOFT) 25 MG tablet; Take 1 tablet by mouth Daily.  Dispense: 90 tablet; Refill: 0    5. Anemia, unspecified type  Has needed transfusions previously.  -     Iron and TIBC; Future  -     Ferritin; Future    6. Chronic constipation  Secondary to opioid use.  We discussed diet changes, and use of Metamucil versus MiraLAX.    History of cardiac arrest  -     carvedilol (COREG) 3.125 MG tablet; Take 1 tablet by mouth 2 (Two) Times a Day With Meals for 30 days.  Dispense: 60 tablet; Refill: 2    An After Visit Summary was printed and given to the patient at discharge.  Return in about 3 months (around 7/9/2025) for Medicare Wellness.         Katy Leone DO  4/9/2025   Electronically signed.  "

## 2025-04-09 ENCOUNTER — OFFICE VISIT (OUTPATIENT)
Dept: FAMILY MEDICINE CLINIC | Facility: CLINIC | Age: 85
End: 2025-04-09
Payer: MEDICARE

## 2025-04-09 ENCOUNTER — LAB (OUTPATIENT)
Dept: LAB | Facility: HOSPITAL | Age: 85
End: 2025-04-09
Payer: MEDICARE

## 2025-04-09 VITALS
DIASTOLIC BLOOD PRESSURE: 80 MMHG | TEMPERATURE: 96.9 F | BODY MASS INDEX: 24.66 KG/M2 | WEIGHT: 134 LBS | SYSTOLIC BLOOD PRESSURE: 130 MMHG | HEART RATE: 82 BPM | OXYGEN SATURATION: 96 % | HEIGHT: 62 IN

## 2025-04-09 DIAGNOSIS — D64.9 ANEMIA, UNSPECIFIED TYPE: ICD-10-CM

## 2025-04-09 DIAGNOSIS — I10 ESSENTIAL HYPERTENSION: Chronic | ICD-10-CM

## 2025-04-09 DIAGNOSIS — E03.9 HYPOTHYROIDISM (ACQUIRED): Primary | Chronic | ICD-10-CM

## 2025-04-09 DIAGNOSIS — F33.1 MAJOR DEPRESSIVE DISORDER, RECURRENT EPISODE, MODERATE DEGREE: ICD-10-CM

## 2025-04-09 DIAGNOSIS — K59.09 CHRONIC CONSTIPATION: Chronic | ICD-10-CM

## 2025-04-09 DIAGNOSIS — F03.90 DEMENTIA, UNSPECIFIED DEMENTIA SEVERITY, UNSPECIFIED DEMENTIA TYPE, UNSPECIFIED WHETHER BEHAVIORAL, PSYCHOTIC, OR MOOD DISTURBANCE OR ANXIETY: ICD-10-CM

## 2025-04-09 DIAGNOSIS — Z79.899 POLYPHARMACY: ICD-10-CM

## 2025-04-09 LAB
ALBUMIN SERPL-MCNC: 3.3 G/DL (ref 3.5–5.2)
ALBUMIN/GLOB SERPL: 0.8 G/DL
ALP SERPL-CCNC: 76 U/L (ref 39–117)
ALT SERPL W P-5'-P-CCNC: <5 U/L (ref 1–33)
ANION GAP SERPL CALCULATED.3IONS-SCNC: 12 MMOL/L (ref 5–15)
AST SERPL-CCNC: 8 U/L (ref 1–32)
BASOPHILS # BLD AUTO: 0.04 10*3/MM3 (ref 0–0.2)
BASOPHILS NFR BLD AUTO: 0.4 % (ref 0–1.5)
BILIRUB SERPL-MCNC: 0.2 MG/DL (ref 0–1.2)
BUN SERPL-MCNC: 43 MG/DL (ref 8–23)
BUN/CREAT SERPL: 16.8 (ref 7–25)
CALCIUM SPEC-SCNC: 9.7 MG/DL (ref 8.6–10.5)
CHLORIDE SERPL-SCNC: 107 MMOL/L (ref 98–107)
CHOLEST SERPL-MCNC: 106 MG/DL (ref 0–200)
CO2 SERPL-SCNC: 19 MMOL/L (ref 22–29)
CREAT SERPL-MCNC: 2.56 MG/DL (ref 0.57–1)
DEPRECATED RDW RBC AUTO: 45.3 FL (ref 37–54)
EGFRCR SERPLBLD CKD-EPI 2021: 18.2 ML/MIN/1.73
EOSINOPHIL # BLD AUTO: 0.32 10*3/MM3 (ref 0–0.4)
EOSINOPHIL NFR BLD AUTO: 3 % (ref 0.3–6.2)
ERYTHROCYTE [DISTWIDTH] IN BLOOD BY AUTOMATED COUNT: 13.6 % (ref 12.3–15.4)
FERRITIN SERPL-MCNC: 349.4 NG/ML (ref 13–150)
GLOBULIN UR ELPH-MCNC: 4.2 GM/DL
GLUCOSE SERPL-MCNC: 123 MG/DL (ref 65–99)
HCT VFR BLD AUTO: 27.9 % (ref 34–46.6)
HDLC SERPL-MCNC: 39 MG/DL (ref 40–60)
HGB BLD-MCNC: 8.9 G/DL (ref 12–15.9)
IMM GRANULOCYTES # BLD AUTO: 0.21 10*3/MM3 (ref 0–0.05)
IMM GRANULOCYTES NFR BLD AUTO: 1.9 % (ref 0–0.5)
IRON 24H UR-MRATE: 45 MCG/DL (ref 37–145)
IRON SATN MFR SERPL: 18 % (ref 20–50)
LDLC SERPL CALC-MCNC: 42 MG/DL (ref 0–100)
LDLC/HDLC SERPL: 0.96 {RATIO}
LYMPHOCYTES # BLD AUTO: 1.82 10*3/MM3 (ref 0.7–3.1)
LYMPHOCYTES NFR BLD AUTO: 16.8 % (ref 19.6–45.3)
MCH RBC QN AUTO: 29.4 PG (ref 26.6–33)
MCHC RBC AUTO-ENTMCNC: 31.9 G/DL (ref 31.5–35.7)
MCV RBC AUTO: 92.1 FL (ref 79–97)
MONOCYTES # BLD AUTO: 0.82 10*3/MM3 (ref 0.1–0.9)
MONOCYTES NFR BLD AUTO: 7.6 % (ref 5–12)
NEUTROPHILS NFR BLD AUTO: 7.61 10*3/MM3 (ref 1.7–7)
NEUTROPHILS NFR BLD AUTO: 70.3 % (ref 42.7–76)
NRBC BLD AUTO-RTO: 0 /100 WBC (ref 0–0.2)
PLATELET # BLD AUTO: 262 10*3/MM3 (ref 140–450)
PMV BLD AUTO: 9.7 FL (ref 6–12)
POTASSIUM SERPL-SCNC: 3.8 MMOL/L (ref 3.5–5.2)
PROT SERPL-MCNC: 7.5 G/DL (ref 6–8.5)
RBC # BLD AUTO: 3.03 10*6/MM3 (ref 3.77–5.28)
SODIUM SERPL-SCNC: 138 MMOL/L (ref 136–145)
TIBC SERPL-MCNC: 249 MCG/DL (ref 298–536)
TRANSFERRIN SERPL-MCNC: 167 MG/DL (ref 200–360)
TRIGL SERPL-MCNC: 147 MG/DL (ref 0–150)
VLDLC SERPL-MCNC: 25 MG/DL (ref 5–40)
WBC NRBC COR # BLD AUTO: 10.82 10*3/MM3 (ref 3.4–10.8)

## 2025-04-09 PROCEDURE — 82728 ASSAY OF FERRITIN: CPT

## 2025-04-09 PROCEDURE — 80053 COMPREHEN METABOLIC PANEL: CPT

## 2025-04-09 PROCEDURE — 80061 LIPID PANEL: CPT

## 2025-04-09 PROCEDURE — 84466 ASSAY OF TRANSFERRIN: CPT

## 2025-04-09 PROCEDURE — 36415 COLL VENOUS BLD VENIPUNCTURE: CPT

## 2025-04-09 PROCEDURE — 83540 ASSAY OF IRON: CPT

## 2025-04-09 PROCEDURE — 85025 COMPLETE CBC W/AUTO DIFF WBC: CPT

## 2025-04-09 RX ORDER — CARVEDILOL 3.12 MG/1
3.12 TABLET ORAL 2 TIMES DAILY WITH MEALS
Qty: 60 TABLET | Refills: 2 | Status: SHIPPED | OUTPATIENT
Start: 2025-04-09 | End: 2025-05-09

## 2025-04-09 RX ORDER — AMLODIPINE BESYLATE 5 MG/1
5 TABLET ORAL
Start: 2025-04-09

## 2025-04-09 RX ORDER — SERTRALINE HYDROCHLORIDE 25 MG/1
25 TABLET, FILM COATED ORAL DAILY
Qty: 90 TABLET | Refills: 0 | Status: SHIPPED | OUTPATIENT
Start: 2025-04-09

## 2025-06-07 ENCOUNTER — APPOINTMENT (OUTPATIENT)
Dept: GENERAL RADIOLOGY | Facility: HOSPITAL | Age: 85
DRG: 690 | End: 2025-06-07
Payer: MEDICARE

## 2025-06-07 ENCOUNTER — HOSPITAL ENCOUNTER (INPATIENT)
Facility: HOSPITAL | Age: 85
LOS: 8 days | Discharge: HOME HEALTH | DRG: 690 | End: 2025-06-15
Attending: EMERGENCY MEDICINE | Admitting: FAMILY MEDICINE
Payer: MEDICARE

## 2025-06-07 ENCOUNTER — APPOINTMENT (OUTPATIENT)
Dept: CT IMAGING | Facility: HOSPITAL | Age: 85
DRG: 690 | End: 2025-06-07
Payer: MEDICARE

## 2025-06-07 DIAGNOSIS — Z74.09 IMPAIRED MOBILITY: ICD-10-CM

## 2025-06-07 DIAGNOSIS — Z75.8 DISCHARGE PLANNING ISSUES: ICD-10-CM

## 2025-06-07 DIAGNOSIS — R62.7 FAILURE TO THRIVE IN ADULT: Primary | ICD-10-CM

## 2025-06-07 DIAGNOSIS — D64.9 ANEMIA, UNSPECIFIED TYPE: ICD-10-CM

## 2025-06-07 DIAGNOSIS — R41.82 ALTERED MENTAL STATUS, UNSPECIFIED ALTERED MENTAL STATUS TYPE: ICD-10-CM

## 2025-06-07 DIAGNOSIS — D64.9 ANEMIA REQUIRING TRANSFUSIONS: ICD-10-CM

## 2025-06-07 DIAGNOSIS — N17.9 ACUTE RENAL FAILURE SUPERIMPOSED ON CHRONIC KIDNEY DISEASE, UNSPECIFIED ACUTE RENAL FAILURE TYPE, UNSPECIFIED CKD STAGE: ICD-10-CM

## 2025-06-07 DIAGNOSIS — N18.9 ACUTE RENAL FAILURE SUPERIMPOSED ON CHRONIC KIDNEY DISEASE, UNSPECIFIED ACUTE RENAL FAILURE TYPE, UNSPECIFIED CKD STAGE: ICD-10-CM

## 2025-06-07 DIAGNOSIS — N39.0 ACUTE UTI (URINARY TRACT INFECTION): ICD-10-CM

## 2025-06-07 LAB
ABO GROUP BLD: NORMAL
ALBUMIN SERPL-MCNC: 3.1 G/DL (ref 3.5–5.2)
ALBUMIN/GLOB SERPL: 0.9 G/DL
ALP SERPL-CCNC: 60 U/L (ref 39–117)
ALT SERPL W P-5'-P-CCNC: 6 U/L (ref 1–33)
ANION GAP SERPL CALCULATED.3IONS-SCNC: 11 MMOL/L (ref 5–15)
AST SERPL-CCNC: 12 U/L (ref 1–32)
BACTERIA UR QL AUTO: ABNORMAL /HPF
BASOPHILS # BLD AUTO: 0.03 10*3/MM3 (ref 0–0.2)
BASOPHILS NFR BLD AUTO: 0.4 % (ref 0–1.5)
BILIRUB SERPL-MCNC: <0.2 MG/DL (ref 0–1.2)
BILIRUB UR QL STRIP: NEGATIVE
BLD GP AB SCN SERPL QL: NEGATIVE
BUN SERPL-MCNC: 48.7 MG/DL (ref 8–23)
BUN/CREAT SERPL: 17.6 (ref 7–25)
CALCIUM SPEC-SCNC: 9.3 MG/DL (ref 8.6–10.5)
CHLORIDE SERPL-SCNC: 106 MMOL/L (ref 98–107)
CK SERPL-CCNC: 145 U/L (ref 20–180)
CLARITY UR: ABNORMAL
CO2 SERPL-SCNC: 21 MMOL/L (ref 22–29)
COLOR UR: YELLOW
CREAT SERPL-MCNC: 2.77 MG/DL (ref 0.57–1)
DEPRECATED RDW RBC AUTO: 45 FL (ref 37–54)
EGFRCR SERPLBLD CKD-EPI 2021: 16.6 ML/MIN/1.73
EOSINOPHIL # BLD AUTO: 0.24 10*3/MM3 (ref 0–0.4)
EOSINOPHIL NFR BLD AUTO: 3.4 % (ref 0.3–6.2)
ERYTHROCYTE [DISTWIDTH] IN BLOOD BY AUTOMATED COUNT: 13.4 % (ref 12.3–15.4)
FERRITIN SERPL-MCNC: 204.6 NG/ML (ref 13–150)
GLOBULIN UR ELPH-MCNC: 3.5 GM/DL
GLUCOSE SERPL-MCNC: 103 MG/DL (ref 65–99)
GLUCOSE UR STRIP-MCNC: NEGATIVE MG/DL
HCT VFR BLD AUTO: 24.6 % (ref 34–46.6)
HGB BLD-MCNC: 7.8 G/DL (ref 12–15.9)
HGB UR QL STRIP.AUTO: ABNORMAL
IMM GRANULOCYTES # BLD AUTO: 0.04 10*3/MM3 (ref 0–0.05)
IMM GRANULOCYTES NFR BLD AUTO: 0.6 % (ref 0–0.5)
IRON 24H UR-MRATE: 30 MCG/DL (ref 37–145)
IRON SATN MFR SERPL: 14 % (ref 20–50)
KETONES UR QL STRIP: NEGATIVE
LEUKOCYTE ESTERASE UR QL STRIP.AUTO: ABNORMAL
LYMPHOCYTES # BLD AUTO: 1.67 10*3/MM3 (ref 0.7–3.1)
LYMPHOCYTES NFR BLD AUTO: 23.4 % (ref 19.6–45.3)
MAGNESIUM SERPL-MCNC: 2 MG/DL (ref 1.6–2.4)
MCH RBC QN AUTO: 29 PG (ref 26.6–33)
MCHC RBC AUTO-ENTMCNC: 31.7 G/DL (ref 31.5–35.7)
MCV RBC AUTO: 91.4 FL (ref 79–97)
MONOCYTES # BLD AUTO: 0.63 10*3/MM3 (ref 0.1–0.9)
MONOCYTES NFR BLD AUTO: 8.8 % (ref 5–12)
NEUTROPHILS NFR BLD AUTO: 4.52 10*3/MM3 (ref 1.7–7)
NEUTROPHILS NFR BLD AUTO: 63.4 % (ref 42.7–76)
NITRITE UR QL STRIP: POSITIVE
NRBC BLD AUTO-RTO: 0 /100 WBC (ref 0–0.2)
PH UR STRIP.AUTO: 5.5 [PH] (ref 5–8)
PLATELET # BLD AUTO: 208 10*3/MM3 (ref 140–450)
PMV BLD AUTO: 10.3 FL (ref 6–12)
POTASSIUM SERPL-SCNC: 4.6 MMOL/L (ref 3.5–5.2)
PROT SERPL-MCNC: 6.6 G/DL (ref 6–8.5)
PROT UR QL STRIP: ABNORMAL
RBC # BLD AUTO: 2.69 10*6/MM3 (ref 3.77–5.28)
RBC # UR STRIP: ABNORMAL /HPF
REF LAB TEST METHOD: ABNORMAL
RETICS # AUTO: 0.04 10*6/MM3 (ref 0.02–0.13)
RETICS/RBC NFR AUTO: 1.64 % (ref 0.7–1.9)
RH BLD: POSITIVE
SODIUM SERPL-SCNC: 138 MMOL/L (ref 136–145)
SP GR UR STRIP: 1.02 (ref 1–1.03)
SQUAMOUS #/AREA URNS HPF: ABNORMAL /HPF
T&S EXPIRATION DATE: NORMAL
TIBC SERPL-MCNC: 210 MCG/DL (ref 298–536)
TRANSFERRIN SERPL-MCNC: 141 MG/DL (ref 200–360)
UROBILINOGEN UR QL STRIP: ABNORMAL
WBC # UR STRIP: ABNORMAL /HPF
WBC NRBC COR # BLD AUTO: 7.13 10*3/MM3 (ref 3.4–10.8)

## 2025-06-07 PROCEDURE — 85025 COMPLETE CBC W/AUTO DIFF WBC: CPT | Performed by: EMERGENCY MEDICINE

## 2025-06-07 PROCEDURE — 83735 ASSAY OF MAGNESIUM: CPT | Performed by: EMERGENCY MEDICINE

## 2025-06-07 PROCEDURE — 25810000003 SODIUM CHLORIDE 0.9 % SOLUTION: Performed by: EMERGENCY MEDICINE

## 2025-06-07 PROCEDURE — 87088 URINE BACTERIA CULTURE: CPT | Performed by: EMERGENCY MEDICINE

## 2025-06-07 PROCEDURE — 86900 BLOOD TYPING SEROLOGIC ABO: CPT | Performed by: NURSE PRACTITIONER

## 2025-06-07 PROCEDURE — 93010 ELECTROCARDIOGRAM REPORT: CPT | Performed by: STUDENT IN AN ORGANIZED HEALTH CARE EDUCATION/TRAINING PROGRAM

## 2025-06-07 PROCEDURE — 82728 ASSAY OF FERRITIN: CPT | Performed by: NURSE PRACTITIONER

## 2025-06-07 PROCEDURE — 87086 URINE CULTURE/COLONY COUNT: CPT | Performed by: EMERGENCY MEDICINE

## 2025-06-07 PROCEDURE — 87186 SC STD MICRODIL/AGAR DIL: CPT | Performed by: EMERGENCY MEDICINE

## 2025-06-07 PROCEDURE — 93005 ELECTROCARDIOGRAM TRACING: CPT

## 2025-06-07 PROCEDURE — 82550 ASSAY OF CK (CPK): CPT | Performed by: EMERGENCY MEDICINE

## 2025-06-07 PROCEDURE — 82607 VITAMIN B-12: CPT | Performed by: NURSE PRACTITIONER

## 2025-06-07 PROCEDURE — 83540 ASSAY OF IRON: CPT | Performed by: NURSE PRACTITIONER

## 2025-06-07 PROCEDURE — 82746 ASSAY OF FOLIC ACID SERUM: CPT | Performed by: NURSE PRACTITIONER

## 2025-06-07 PROCEDURE — 86850 RBC ANTIBODY SCREEN: CPT | Performed by: NURSE PRACTITIONER

## 2025-06-07 PROCEDURE — 25010000002 CEFTRIAXONE PER 250 MG: Performed by: EMERGENCY MEDICINE

## 2025-06-07 PROCEDURE — 86901 BLOOD TYPING SEROLOGIC RH(D): CPT | Performed by: NURSE PRACTITIONER

## 2025-06-07 PROCEDURE — 71045 X-RAY EXAM CHEST 1 VIEW: CPT

## 2025-06-07 PROCEDURE — 99285 EMERGENCY DEPT VISIT HI MDM: CPT | Performed by: EMERGENCY MEDICINE

## 2025-06-07 PROCEDURE — 25810000003 SODIUM CHLORIDE 0.9 % SOLUTION: Performed by: NURSE PRACTITIONER

## 2025-06-07 PROCEDURE — 36415 COLL VENOUS BLD VENIPUNCTURE: CPT | Performed by: EMERGENCY MEDICINE

## 2025-06-07 PROCEDURE — 70450 CT HEAD/BRAIN W/O DYE: CPT

## 2025-06-07 PROCEDURE — 84466 ASSAY OF TRANSFERRIN: CPT | Performed by: NURSE PRACTITIONER

## 2025-06-07 PROCEDURE — P9612 CATHETERIZE FOR URINE SPEC: HCPCS

## 2025-06-07 PROCEDURE — 81001 URINALYSIS AUTO W/SCOPE: CPT | Performed by: EMERGENCY MEDICINE

## 2025-06-07 PROCEDURE — 80053 COMPREHEN METABOLIC PANEL: CPT | Performed by: EMERGENCY MEDICINE

## 2025-06-07 PROCEDURE — 85045 AUTOMATED RETICULOCYTE COUNT: CPT | Performed by: NURSE PRACTITIONER

## 2025-06-07 RX ORDER — ONDANSETRON 4 MG/1
4 TABLET, ORALLY DISINTEGRATING ORAL EVERY 6 HOURS PRN
Status: DISCONTINUED | OUTPATIENT
Start: 2025-06-07 | End: 2025-06-15 | Stop reason: HOSPADM

## 2025-06-07 RX ORDER — ACETAMINOPHEN 160 MG/5ML
650 SOLUTION ORAL EVERY 4 HOURS PRN
Status: DISCONTINUED | OUTPATIENT
Start: 2025-06-07 | End: 2025-06-15 | Stop reason: HOSPADM

## 2025-06-07 RX ORDER — LEVOTHYROXINE SODIUM 50 UG/1
50 TABLET ORAL EVERY MORNING
Status: DISCONTINUED | OUTPATIENT
Start: 2025-06-08 | End: 2025-06-15 | Stop reason: HOSPADM

## 2025-06-07 RX ORDER — ACETAMINOPHEN 325 MG/1
650 TABLET ORAL EVERY 4 HOURS PRN
Status: DISCONTINUED | OUTPATIENT
Start: 2025-06-07 | End: 2025-06-15 | Stop reason: HOSPADM

## 2025-06-07 RX ORDER — BISACODYL 5 MG/1
5 TABLET, DELAYED RELEASE ORAL DAILY PRN
Status: DISCONTINUED | OUTPATIENT
Start: 2025-06-07 | End: 2025-06-11

## 2025-06-07 RX ORDER — ACETAMINOPHEN 650 MG/1
650 SUPPOSITORY RECTAL EVERY 4 HOURS PRN
Status: DISCONTINUED | OUTPATIENT
Start: 2025-06-07 | End: 2025-06-07 | Stop reason: SDUPTHER

## 2025-06-07 RX ORDER — ACETAMINOPHEN 325 MG/1
650 TABLET ORAL EVERY 4 HOURS PRN
Status: DISCONTINUED | OUTPATIENT
Start: 2025-06-07 | End: 2025-06-07 | Stop reason: SDUPTHER

## 2025-06-07 RX ORDER — SODIUM CHLORIDE 0.9 % (FLUSH) 0.9 %
10 SYRINGE (ML) INJECTION EVERY 12 HOURS SCHEDULED
Status: DISCONTINUED | OUTPATIENT
Start: 2025-06-07 | End: 2025-06-15 | Stop reason: HOSPADM

## 2025-06-07 RX ORDER — ACETAMINOPHEN 650 MG/1
650 SUPPOSITORY RECTAL EVERY 4 HOURS PRN
Status: DISCONTINUED | OUTPATIENT
Start: 2025-06-07 | End: 2025-06-15 | Stop reason: HOSPADM

## 2025-06-07 RX ORDER — ONDANSETRON 2 MG/ML
4 INJECTION INTRAMUSCULAR; INTRAVENOUS EVERY 6 HOURS PRN
Status: DISCONTINUED | OUTPATIENT
Start: 2025-06-07 | End: 2025-06-15 | Stop reason: HOSPADM

## 2025-06-07 RX ORDER — AMLODIPINE BESYLATE 5 MG/1
5 TABLET ORAL
Status: DISCONTINUED | OUTPATIENT
Start: 2025-06-07 | End: 2025-06-15 | Stop reason: HOSPADM

## 2025-06-07 RX ORDER — ACETAMINOPHEN 160 MG/5ML
650 SOLUTION ORAL EVERY 4 HOURS PRN
Status: DISCONTINUED | OUTPATIENT
Start: 2025-06-07 | End: 2025-06-07 | Stop reason: SDUPTHER

## 2025-06-07 RX ORDER — BISACODYL 10 MG
10 SUPPOSITORY, RECTAL RECTAL DAILY PRN
Status: DISCONTINUED | OUTPATIENT
Start: 2025-06-07 | End: 2025-06-10 | Stop reason: SDUPTHER

## 2025-06-07 RX ORDER — GABAPENTIN 300 MG/1
300 CAPSULE ORAL EVERY 12 HOURS SCHEDULED
Status: DISCONTINUED | OUTPATIENT
Start: 2025-06-07 | End: 2025-06-15 | Stop reason: HOSPADM

## 2025-06-07 RX ORDER — DONEPEZIL HYDROCHLORIDE 10 MG/1
10 TABLET, FILM COATED ORAL NIGHTLY
Status: DISCONTINUED | OUTPATIENT
Start: 2025-06-07 | End: 2025-06-15 | Stop reason: HOSPADM

## 2025-06-07 RX ORDER — SODIUM CHLORIDE 9 MG/ML
75 INJECTION, SOLUTION INTRAVENOUS CONTINUOUS
Status: DISPENSED | OUTPATIENT
Start: 2025-06-07 | End: 2025-06-09

## 2025-06-07 RX ORDER — SODIUM CHLORIDE 9 MG/ML
40 INJECTION, SOLUTION INTRAVENOUS AS NEEDED
Status: DISCONTINUED | OUTPATIENT
Start: 2025-06-07 | End: 2025-06-15 | Stop reason: HOSPADM

## 2025-06-07 RX ORDER — NITROGLYCERIN 0.4 MG/1
0.4 TABLET SUBLINGUAL
Status: DISCONTINUED | OUTPATIENT
Start: 2025-06-07 | End: 2025-06-15 | Stop reason: HOSPADM

## 2025-06-07 RX ORDER — PANTOPRAZOLE SODIUM 40 MG/1
40 TABLET, DELAYED RELEASE ORAL
Status: DISCONTINUED | OUTPATIENT
Start: 2025-06-08 | End: 2025-06-15 | Stop reason: HOSPADM

## 2025-06-07 RX ORDER — POLYETHYLENE GLYCOL 3350 17 G/17G
17 POWDER, FOR SOLUTION ORAL DAILY PRN
Status: DISCONTINUED | OUTPATIENT
Start: 2025-06-07 | End: 2025-06-11

## 2025-06-07 RX ORDER — CARVEDILOL 3.12 MG/1
3.12 TABLET ORAL 2 TIMES DAILY WITH MEALS
Status: DISCONTINUED | OUTPATIENT
Start: 2025-06-07 | End: 2025-06-15 | Stop reason: HOSPADM

## 2025-06-07 RX ORDER — AMOXICILLIN 250 MG
2 CAPSULE ORAL 2 TIMES DAILY PRN
Status: DISCONTINUED | OUTPATIENT
Start: 2025-06-07 | End: 2025-06-11

## 2025-06-07 RX ORDER — SODIUM CHLORIDE 0.9 % (FLUSH) 0.9 %
10 SYRINGE (ML) INJECTION AS NEEDED
Status: DISCONTINUED | OUTPATIENT
Start: 2025-06-07 | End: 2025-06-15 | Stop reason: HOSPADM

## 2025-06-07 RX ORDER — ATORVASTATIN CALCIUM 40 MG/1
40 TABLET, FILM COATED ORAL DAILY
Status: DISCONTINUED | OUTPATIENT
Start: 2025-06-07 | End: 2025-06-15 | Stop reason: HOSPADM

## 2025-06-07 RX ORDER — OXYCODONE HYDROCHLORIDE 5 MG/1
15 TABLET ORAL 3 TIMES DAILY PRN
Refills: 0 | Status: DISCONTINUED | OUTPATIENT
Start: 2025-06-07 | End: 2025-06-15 | Stop reason: HOSPADM

## 2025-06-07 RX ADMIN — ATORVASTATIN CALCIUM 40 MG: 40 TABLET, FILM COATED ORAL at 18:09

## 2025-06-07 RX ADMIN — Medication 10 ML: at 21:51

## 2025-06-07 RX ADMIN — SODIUM CHLORIDE 75 ML/HR: 9 INJECTION, SOLUTION INTRAVENOUS at 14:54

## 2025-06-07 RX ADMIN — SODIUM CHLORIDE 1000 ML: 9 INJECTION, SOLUTION INTRAVENOUS at 13:48

## 2025-06-07 RX ADMIN — GABAPENTIN 300 MG: 300 CAPSULE ORAL at 21:51

## 2025-06-07 RX ADMIN — CEFTRIAXONE SODIUM 1000 MG: 1 INJECTION, POWDER, FOR SOLUTION INTRAMUSCULAR; INTRAVENOUS at 13:48

## 2025-06-07 RX ADMIN — DONEPEZIL HYDROCHLORIDE 10 MG: 10 TABLET, FILM COATED ORAL at 21:51

## 2025-06-07 NOTE — PLAN OF CARE
Goal Outcome Evaluation:         Patient is A&Ox3, VSS, RA, cont pulse ox, telemetry, NSR, Pure wick in place dark yellow urine. Incont brief in place. No c/o pain or discomfort. All safety maintained and call light in reach.

## 2025-06-07 NOTE — ED PROVIDER NOTES
Subjective   History of Present Illness  Patient is 84-year-old who came in to the ED and was found on the floor unwitnessed.  By family patient is currently awake follows commands moving all 4 extremities and denies any complaints.    Altered Mental Status  Presenting symptoms: confusion    Severity:  Moderate  Most recent episode:  Today  Episode history:  Single  Timing:  Constant  Chronicity:  Recurrent  Context: dementia    Context: not drug use, not head injury, not nursing home resident and not recent change in medication    Associated symptoms: weakness    Associated symptoms: no abdominal pain, normal movement, no agitation, no bladder incontinence, no decreased appetite, no depression, no difficulty breathing, no fever, no hallucinations, no headaches, no nausea, no palpitations, no seizures, no slurred speech and no vomiting        Review of Systems   Constitutional:  Negative for decreased appetite and fever.   HENT: Negative.     Eyes: Negative.    Respiratory: Negative.     Cardiovascular: Negative.  Negative for palpitations.   Gastrointestinal: Negative.  Negative for abdominal pain, nausea and vomiting.   Genitourinary:  Negative for bladder incontinence.   Musculoskeletal: Negative.  Negative for back pain and neck pain.   Skin: Negative.    Neurological:  Positive for weakness. Negative for seizures and headaches.   Psychiatric/Behavioral:  Positive for confusion. Negative for agitation and hallucinations.    All other systems reviewed and are negative.      Past Medical History:   Diagnosis Date    Anxiety     Arthritis     Bronchitis     Cancer     uterine    Chronic pain     Depression     Disease of thyroid gland     Fibromyalgia     GERD (gastroesophageal reflux disease)     Headache     History of transfusion     AS     Hyperlipidemia     Hypertension     Incontinence     Insomnia     Leg pain     Lumbar stenosis     Migraines     Peptic ulcer     Restless legs     Sleep apnea     NO  C-PAP    UTI (urinary tract infection)     Vaginal bleeding        Allergies   Allergen Reactions    Ropinirole Hcl Shortness Of Breath    Codeine Itching and Mental Status Change    Definity [Perflutren Lipid Microsphere] Other (See Comments)     Severe back pain    Ambien [Zolpidem] Other (See Comments)     HYPER     Eszopiclone Other (See Comments)     MAKES PT HYPER     Pregabalin Dizziness    Tizanidine Other (See Comments)     Terrible nightmares       Past Surgical History:   Procedure Laterality Date    BLADDER REPAIR  2011    MESH HAD TO BE REMOVED IN 2013    BREAST BIOPSY Right 2017    benign    BREAST CYST EXCISION Left 1982    CARDIAC CATHETERIZATION      CARPAL TUNNEL RELEASE      CATARACT EXTRACTION W/ INTRAOCULAR LENS  IMPLANT, BILATERAL      CHOLECYSTECTOMY WITH INTRAOPERATIVE CHOLANGIOGRAM N/A 9/7/2023    Procedure: CHOLECYSTECTOMY LAPAROSCOPIC INTRAOPERATIVE CHOLANGIOGRAM;  Surgeon: Gerardo Arciniega MD;  Location: [facility] OR;  Service: General;  Laterality: N/A;    COLONOSCOPY      COLONOSCOPY N/A 10/01/2021    Procedure: COLONOSCOPY WITH ANESTHESIA;  Surgeon: Tom Velasco DO;  Location: [facility] ENDOSCOPY;  Service: Gastroenterology;  Laterality: N/A;  pre: change in bowel habits  post: diverticulosis. hemorrhoids.   Olivia Mora APRN        CYSTECTOMY      D & C HYSTEROSCOPY N/A 11/06/2017    Procedure: DILATATION AND CURETTAGE HYSTEROSCOPY;  Surgeon: Shasta Madrigal MD;  Location: [facility] OR;  Service:     DILATION AND CURETTAGE, DIAGNOSTIC / THERAPEUTIC  2008    ENDOSCOPY  09/23/2010    Short segment of Arriola's,Moderate chroninc esophagogastritis and negative H.pylori    ENDOSCOPY N/A 09/25/2017    Procedure: ESOPHAGOGASTRODUODENOSCOPY WITH ANESTHESIA;  Surgeon: Tom Velasco DO;  Location: [facility] ENDOSCOPY;  Service:     EYE SURGERY      RETINA    HEMORRHOIDECTOMY SIGMOIDOSCOPY N/A 3/21/2023    Procedure: HEMORRHOIDECTOMY WITH EXAM UNDER ANESTHESIA;  Surgeon: Holly Chavez  MD JOCELYNN;  Location:  [facility] OR;  Service: General;  Laterality: N/A;    HYSTERECTOMY  12/20/2017    ORIF TIBIA/FIBULA FRACTURES Left 2000    TRANSVAGINAL TAPING SUSPENSION N/A 11/06/2017    Procedure: VAGINAL MESH REVISION;  Surgeon: Shasta Madrigal MD;  Location:  [facility] OR;  Service:     VAGINAL MESH REVISION  2013       Family History   Problem Relation Age of Onset    Diabetes Mother     Multiple myeloma Mother     Stroke Father     Diabetes Sister     Prostate cancer Brother     Lymphoma Brother         NHL    Ovarian cancer Paternal Aunt     Cancer Paternal Grandmother         metastatic    Lung cancer Paternal Grandfather     Colon cancer Neg Hx     Esophageal cancer Neg Hx     Breast cancer Neg Hx        Social History     Socioeconomic History    Marital status:    Tobacco Use    Smoking status: Never    Smokeless tobacco: Never   Vaping Use    Vaping status: Never Used   Substance and Sexual Activity    Alcohol use: Not Currently     Comment: occasional    Drug use: No    Sexual activity: Defer           Objective   Physical Exam  Vitals and nursing note reviewed. Exam conducted with a chaperone present.   Constitutional:       General: She is awake. She is not in acute distress.     Appearance: Normal appearance. She is well-developed. She is ill-appearing. She is not toxic-appearing or diaphoretic.   HENT:      Head: Normocephalic and atraumatic.      Mouth/Throat:      Mouth: Mucous membranes are moist.      Pharynx: Oropharynx is clear.   Eyes:      General: Lids are normal. Lids are everted, no foreign bodies appreciated.      Pupils: Pupils are equal, round, and reactive to light.   Neck:      Thyroid: No thyromegaly.      Vascular: Normal carotid pulses. No carotid bruit or JVD.      Trachea: Trachea and phonation normal. No tracheal tenderness or tracheal deviation.      Meningeal: Brudzinski's sign and Kernig's sign absent.   Cardiovascular:      Rate and Rhythm: Normal rate and regular rhythm.       Pulses: Normal pulses.      Heart sounds: Normal heart sounds. No murmur heard.  Pulmonary:      Effort: Pulmonary effort is normal. No tachypnea or respiratory distress.      Breath sounds: Normal breath sounds. No stridor. No wheezing or rhonchi.   Abdominal:      General: Abdomen is flat. Bowel sounds are normal. There is no distension.      Palpations: Abdomen is soft. There is no mass.      Tenderness: There is no abdominal tenderness. There is no guarding.   Musculoskeletal:         General: Normal range of motion.      Cervical back: Full passive range of motion without pain, normal range of motion and neck supple. No rigidity.      Right lower leg: Edema present.      Left lower leg: Edema present.      Comments: L-spine T-spine C-spine negative step-off laxity or tenderness.  Long bone examination unremarkable.  And there is no deformities noted.  Able to move all 4 extremities.   Skin:     General: Skin is warm and dry.      Capillary Refill: Capillary refill takes less than 2 seconds.      Coloration: Skin is not pale.      Nails: There is no clubbing.   Neurological:      General: No focal deficit present.      Mental Status: She is alert. Mental status is at baseline. She is disoriented and confused.      GCS: GCS eye subscore is 4. GCS verbal subscore is 5. GCS motor subscore is 6.      Cranial Nerves: Cranial nerves 2-12 are intact. No cranial nerve deficit.      Sensory: Sensation is intact. No sensory deficit.      Motor: Motor function is intact. No abnormal muscle tone.      Deep Tendon Reflexes:      Reflex Scores:       Bicep reflexes are 2+ on the left side.  Psychiatric:         Behavior: Behavior is cooperative.         Procedures           ED Course  ED Course as of 06/07/25 1349   Sat Jun 07, 2025   1047 nsr [TS]   1049 . Patient is 18 or older, presenting with minor blunt head trauma. Head CT (including cosigned orders) was ordered  by an emergency care clinician for trauma because  patient is 65 or older.        [TS]      ED Course User Index  [TS] Sanford Rothman MD                                                       Medical Decision Making  Differential Diagnosis:  I considered toxic-metabolic etiology, hypoglycemia, hyperglycemia, diabetic ketoacidosis, drug overdose, ethanol intoxication, thiamine deficiency, hypothermia, hyponatremia, hypernatremia, organ failure, liver failure, kidney failure, thyroid failure, adrenal failure, hypoxia, hypercarbia, ischemic stroke, intracranial bleed, subarachnoid hemorrhage, closed head injury, subdural hematoma, seizure activity, syncopal episode, infectious etiology, hypertensive encephalopathy, vasculitis, thrombotic thrombocytopenic purpura and disseminated intravascular coagulation as a possible cause of altered mental status in this patient. This is a partial list of diagnoses considered.    Patient given altered mental status was found on the floor.  Moving all 4 extremities.  On examination the patient appears somewhat confused answers question appropriately and follows commands.  Is able to sit up there is no cervical spine T-spine or L-spine step-off laxity tenderness.  Palpation of both upper and lower extremities reveals no joint tenderness or long bone deformity.  The patient's CT of the head is negative for any acute abdominal pathology there is no evidence of rhabdomyolysis.  The patient does have a UTI for which she received Rocephin and has got acute on chronic renal insufficiency for which she got IV fluids she is also anemic.  I have discussed this case with Joselin our  this patient had adult productive services case on her behalf and was in a nursing home since the adult practice services denied the patient was sent back home to her family.  Somehow the patient got discharged from the nursing home and in family's house.  Will admit to medicine for further evaluation.        Problems Addressed:  Acute renal failure  superimposed on chronic kidney disease, unspecified acute renal failure type, unspecified CKD stage: acute illness or injury  Acute UTI (urinary tract infection): acute illness or injury  Altered mental status, unspecified altered mental status type: acute illness or injury  Anemia, unspecified type: complicated acute illness or injury  Discharge planning issues: chronic illness or injury  Failure to thrive in adult: chronic illness or injury    Amount and/or Complexity of Data Reviewed  Labs: ordered.     Details: Labs reviewed  Radiology: ordered.  Discussion of management or test interpretation with external provider(s): Discussed with the hospitalist    Risk  Decision regarding hospitalization.  Risk Details: Patient with altered mental status confusion with a history of fall with a UTI.  Will be admitted to medicine service.        Final diagnoses:   Failure to thrive in adult   Altered mental status, unspecified altered mental status type   Acute renal failure superimposed on chronic kidney disease, unspecified acute renal failure type, unspecified CKD stage   Acute UTI (urinary tract infection)   Anemia, unspecified type   Discharge planning issues       ED Disposition  ED Disposition       ED Disposition   Decision to Admit    Condition   --    Comment   Level of Care: Telemetry [5]   Diagnosis: Altered mental status [780.97.ICD-9-CM]   Admitting Physician: MAN MEADOWS [[id number]]   Attending Physician: MAN MEADOWS [[id number]]   Certification: I Certify That Inpatient Hospital Services Are Medically Necessary For Greater Than 2 Midnights                 No follow-up provider specified.       Medication List      No changes were made to your prescriptions during this visit.            Sanford Rothman MD  06/07/25 2714       Sanford Rothman MD  06/07/25 5828

## 2025-06-07 NOTE — H&P
[facility] Medicine Services  HISTORY AND PHYSICAL    Date of Admission: 2025  Primary Care Physician: Katy Leone DO    Subjective   Primary Historian:  daughter, Lola    Chief Complaint:  Altered Mental Status    History of Present Illness  Jennifer Gomes is 84-year-old who came in to the ED and was found on the floor unwitnessed by family. Patient is currently awake follows commands moving all 4 extremities and denies any complaints.     Patient has a past medical history of hypertension, hyperlipidemia, GERD, fibromyalgia, depression, migraines, sleep apnea-no CPAP, dementia and anxiety.  Patient was asleep in ER during my assessment.  Patient was very drowsy, and would not wake up for me.  She squinted her eyes when I shined a penlight in them.  Patient is very pale.  Hemoglobin is 7.8.  Contacted daughter, Lola Liang by phone, to verify patient's code status.  She said patient is a DNR-DNI.  Patient had been in a nursing home and was sent back to her home in the past 2 months.  CT of head done in ER and is negative.  ER discussed patient with the , Joselin, due to Adult Protective Services had been involved in patient's case in recently.  Will consult  to assess for discharge needs.      Review of Systems   Otherwise complete ROS reviewed and negative except as mentioned in the HPI.    Past Medical History:   Past Medical History:   Diagnosis Date    Anxiety     Arthritis     Bronchitis     Cancer     uterine    Chronic pain     Depression     Disease of thyroid gland     Fibromyalgia     GERD (gastroesophageal reflux disease)     Headache     History of transfusion     AS     Hyperlipidemia     Hypertension     Incontinence     Insomnia     Leg pain     Lumbar stenosis     Migraines     Peptic ulcer     Restless legs     Sleep apnea     NO C-PAP    UTI (urinary tract infection)     Vaginal bleeding      Past Surgical History:  Past  Surgical History:   Procedure Laterality Date    BLADDER REPAIR  2011    MESH HAD TO BE REMOVED IN 2013    BREAST BIOPSY Right 2017    benign    BREAST CYST EXCISION Left 1982    CARDIAC CATHETERIZATION      CARPAL TUNNEL RELEASE      CATARACT EXTRACTION W/ INTRAOCULAR LENS  IMPLANT, BILATERAL      CHOLECYSTECTOMY WITH INTRAOPERATIVE CHOLANGIOGRAM N/A 9/7/2023    Procedure: CHOLECYSTECTOMY LAPAROSCOPIC INTRAOPERATIVE CHOLANGIOGRAM;  Surgeon: Gerardo Arciniega MD;  Location: [facility] OR;  Service: General;  Laterality: N/A;    COLONOSCOPY      COLONOSCOPY N/A 10/01/2021    Procedure: COLONOSCOPY WITH ANESTHESIA;  Surgeon: Tom Velasco DO;  Location:  [facility];  Service: Gastroenterology;  Laterality: N/A;  pre: change in bowel habits  post: diverticulosis. hemorrhoids.   Olivia Mora APRN        CYSTECTOMY      D & C HYSTEROSCOPY N/A 11/06/2017    Procedure: DILATATION AND CURETTAGE HYSTEROSCOPY;  Surgeon: Shasta Madrigal MD;  Location: [facility] OR;  Service:     DILATION AND CURETTAGE, DIAGNOSTIC / THERAPEUTIC  2008    ENDOSCOPY  09/23/2010    Short segment of Arriola's,Moderate chroninc esophagogastritis and negative H.pylori    ENDOSCOPY N/A 09/25/2017    Procedure: ESOPHAGOGASTRODUODENOSCOPY WITH ANESTHESIA;  Surgeon: Tom Velasco DO;  Location: [facility] ENDOSCOPY;  Service:     EYE SURGERY      RETINA    HEMORRHOIDECTOMY SIGMOIDOSCOPY N/A 3/21/2023    Procedure: HEMORRHOIDECTOMY WITH EXAM UNDER ANESTHESIA;  Surgeon: Holly Chavez MD;  Location: [facility] OR;  Service: General;  Laterality: N/A;    HYSTERECTOMY  12/20/2017    ORIF TIBIA/FIBULA FRACTURES Left 2000    TRANSVAGINAL TAPING SUSPENSION N/A 11/06/2017    Procedure: VAGINAL MESH REVISION;  Surgeon: Shasta Madrigal MD;  Location: [facility] OR;  Service:     VAGINAL MESH REVISION  2013     Social History:  reports that she has never smoked. She has never used smokeless tobacco. She reports that she does not currently use alcohol. She  reports that she does not use drugs.    Family History: family history includes Cancer in her paternal grandmother; Diabetes in her mother and sister; Lung cancer in her paternal grandfather; Lymphoma in her brother; Multiple myeloma in her mother; Ovarian cancer in her paternal aunt; Prostate cancer in her brother; Stroke in her father.       Allergies:  Allergies   Allergen Reactions    Ropinirole Hcl Shortness Of Breath    Codeine Itching and Mental Status Change    Definity [Perflutren Lipid Microsphere] Other (See Comments)     Severe back pain    Ambien [Zolpidem] Other (See Comments)     HYPER     Eszopiclone Other (See Comments)     MAKES PT HYPER     Pregabalin Dizziness    Tizanidine Other (See Comments)     Terrible nightmares       Medications:  Prior to Admission medications    Medication Sig Start Date End Date Taking? Authorizing Provider   acetaminophen (TYLENOL) 325 MG tablet Take 2 tablets by mouth Every 6 (Six) Hours As Needed for Mild Pain.    Tamiko Gaviria MD   amLODIPine (NORVASC) 5 MG tablet Take 1 tablet by mouth Daily. 4/9/25   Katy Leone DO   atorvastatin (LIPITOR) 40 MG tablet Take 1 tablet by mouth Daily.    Tamiko Gaviria MD   butalbital-acetaminophen-caffeine (FIORICET, ESGIC) -40 MG per tablet Take 1 tablet by mouth Every 12 (Twelve) Hours.  Patient not taking: Reported on 4/9/2025 3/18/25   Tamiko Gaviria MD   carvedilol (COREG) 3.125 MG tablet Take 1 tablet by mouth 2 (Two) Times a Day With Meals for 30 days. 4/9/25 5/9/25  Katy Leone DO   cyclobenzaprine (FLEXERIL) 10 MG tablet Take 1 tablet by mouth Every 12 (Twelve) Hours. 3/17/25   Tamiko Gaviria MD   donepezil (ARICEPT) 10 MG tablet Take 1 tablet by mouth Every Night. 10/18/22   Tamiko Gaviria MD   ergocalciferol (ERGOCALCIFEROL) 48224 units capsule Take 1 capsule by mouth 1 (One) Time Per Week. Saturday 4/17/19   Tamiko Gaviria MD   gabapentin (NEURONTIN) 300 MG  "capsule TAKE 1 CAPSULE BY MOUTH DAILY FOR 2 WEEKS, AND THEN TAKE 1 CAPSULE BY MOUTH TWICE DAILY THEREAFTER. 3/18/25   Tamiko Gaviria MD   lansoprazole (PREVACID) 30 MG capsule Take 1 capsule by mouth Daily.    Tamiko Gaviria MD   levothyroxine (SYNTHROID, LEVOTHROID) 50 MCG tablet Take 1 tablet by mouth Daily.    Tamiko Gaviria MD   naloxone (NARCAN) 4 MG/0.1ML nasal spray Administer 1 spray into the nostril(s) as directed by provider As Needed for Opioid Reversal.  Patient not taking: Reported on 4/9/2025    Tamiko Gaviria MD   oxyCODONE (ROXICODONE) 15 MG immediate release tablet TAKE 1 TABLET BY MOUTH 3 TIMES A DAY. MUST LAST 30 DAYS. 3/18/25   Tamiko Gaviria MD   sertraline (ZOLOFT) 25 MG tablet Take 1 tablet by mouth Daily. 4/9/25   Katy Leone, DO     I have utilized all available immediate resources to obtain, update, or review the patient's current medications (including all prescriptions, over-the-counter products, herbals, cannabis/cannabidiol products, and vitamin/mineral/dietary (nutritional) supplements).    Objective     Vital Signs: /41 (BP Location: Right arm, Patient Position: Lying)   Pulse 73   Temp 97.6 °F (36.4 °C) (Oral)   Resp 18   Ht 157.5 cm (62\")   Wt 60.8 kg (134 lb)   LMP  (LMP Unknown)   SpO2 98%   BMI 24.51 kg/m²   Physical Exam  Constitutional:       General: She is sleeping. She is not in acute distress.     Appearance: She is ill-appearing.   HENT:      Head: Normocephalic and atraumatic.      Nose: Nose normal.      Mouth/Throat:      Mouth: Mucous membranes are dry.      Pharynx: Oropharynx is clear.   Eyes:      Conjunctiva/sclera: Conjunctivae normal.      Pupils: Pupils are equal, round, and reactive to light.   Cardiovascular:      Rate and Rhythm: Normal rate and regular rhythm.      Pulses: Normal pulses.      Heart sounds: Normal heart sounds.   Pulmonary:      Effort: Pulmonary effort is normal.      Breath sounds: Normal " breath sounds.   Abdominal:      General: Bowel sounds are normal.      Palpations: Abdomen is soft.   Musculoskeletal:         General: No swelling.      Cervical back: Normal range of motion and neck supple.      Right lower leg: No edema.      Left lower leg: No edema.   Skin:     General: Skin is warm and dry.      Capillary Refill: Capillary refill takes 2 to 3 seconds.      Coloration: Skin is pale.   Neurological:      Mental Status: Mental status is at baseline. She is lethargic.   Psychiatric:      Comments: Unable to assess        Results Reviewed:  Lab Results (last 24 hours)       Procedure Component Value Units Date/Time    Comprehensive Metabolic Panel [[id number]]  (Abnormal) Collected: 06/07/25 1255    Specimen: Blood Updated: 06/07/25 1325     Glucose 103 mg/dL      BUN 48.7 mg/dL      Creatinine 2.77 mg/dL      Sodium 138 mmol/L      Potassium 4.6 mmol/L      Chloride 106 mmol/L      CO2 21.0 mmol/L      Calcium 9.3 mg/dL      Total Protein 6.6 g/dL      Albumin 3.1 g/dL      ALT (SGPT) 6 U/L      AST (SGOT) 12 U/L      Alkaline Phosphatase 60 U/L      Total Bilirubin <0.2 mg/dL      Globulin 3.5 gm/dL      A/G Ratio 0.9 g/dL      BUN/Creatinine Ratio 17.6     Anion Gap 11.0 mmol/L      eGFR 16.6 mL/min/1.73     Narrative:      GFR Categories in Chronic Kidney Disease (CKD)              GFR Category          GFR (mL/min/1.73)    Interpretation  G1                    90 or greater        Normal or high (1)  G2                    60-89                Mild decrease (1)  G3a                   45-59                Mild to moderate decrease  G3b                   30-44                Moderate to severe decrease  G4                    15-29                Severe decrease  G5                    14 or less           Kidney failure    (1)In the absence of evidence of kidney disease, neither GFR category G1 or G2 fulfill the criteria for CKD.    eGFR calculation 2021 CKD-EPI creatinine equation, which does not  include race as a factor    CK [[id number]]  (Normal) Collected: 06/07/25 1255    Specimen: Blood Updated: 06/07/25 1325     Creatine Kinase 145 U/L     Magnesium [[id number]]  (Normal) Collected: 06/07/25 1255    Specimen: Blood Updated: 06/07/25 1323     Magnesium 2.0 mg/dL     CBC & Differential [[id number]]  (Abnormal) Collected: 06/07/25 1255    Specimen: Blood Updated: 06/07/25 1308    Narrative:      The following orders were created for panel order CBC & Differential.  Procedure                               Abnormality         Status                     ---------                               -----------         ------                     CBC Auto Differential[[id number]]        Abnormal            Final result                 Please view results for these tests on the individual orders.    CBC Auto Differential [[id number]]  (Abnormal) Collected: 06/07/25 1255    Specimen: Blood Updated: 06/07/25 1308     WBC 7.13 10*3/mm3      RBC 2.69 10*6/mm3      Hemoglobin 7.8 g/dL      Hematocrit 24.6 %      MCV 91.4 fL      MCH 29.0 pg      MCHC 31.7 g/dL      RDW 13.4 %      RDW-SD 45.0 fl      MPV 10.3 fL      Platelets 208 10*3/mm3      Neutrophil % 63.4 %      Lymphocyte % 23.4 %      Monocyte % 8.8 %      Eosinophil % 3.4 %      Basophil % 0.4 %      Immature Grans % 0.6 %      Neutrophils, Absolute 4.52 10*3/mm3      Lymphocytes, Absolute 1.67 10*3/mm3      Monocytes, Absolute 0.63 10*3/mm3      Eosinophils, Absolute 0.24 10*3/mm3      Basophils, Absolute 0.03 10*3/mm3      Immature Grans, Absolute 0.04 10*3/mm3      nRBC 0.0 /100 WBC     Urinalysis With Culture If Indicated - Urine, Catheter [[id number]]  (Abnormal) Collected: 06/07/25 1148    Specimen: Urine, Catheter Updated: 06/07/25 1214     Color, UA Yellow     Appearance, UA Turbid     pH, UA 5.5     Specific Gravity, UA 1.016     Glucose, UA Negative     Ketones, UA Negative     Bilirubin, UA Negative     Blood, UA Moderate (2+)     Protein,  mg/dL  (2+)     Leuk Esterase, UA Large (3+)     Nitrite, UA Positive     Urobilinogen, UA 0.2 E.U./dL    Narrative:      In absence of clinical symptoms, the presence of pyuria, bacteria, and/or nitrites on the urinalysis result does not correlate with infection.    Urinalysis, Microscopic Only - Urine, Catheter [[id number]]  (Abnormal) Collected: 06/07/25 1148    Specimen: Urine, Catheter Updated: 06/07/25 1214     RBC, UA None Seen /HPF      WBC, UA Too Numerous to Count /HPF      Bacteria, UA 3+ /HPF      Squamous Epithelial Cells, UA None Seen /HPF      Methodology Manual Light Microscopy    Urine Culture - Urine, Urine, Catheter [[id number]] Collected: 06/07/25 1148    Specimen: Urine, Catheter Updated: 06/07/25 1214          Imaging Results (Last 24 Hours)       Procedure Component Value Units Date/Time    XR Chest 1 View [[id number]] Collected: 06/07/25 1205     Updated: 06/07/25 1210    Narrative:      XR CHEST 1 VW-     HISTORY: fall     COMPARISON: 12/18/2024     FINDINGS: Frontal view of the chest obtained.     Low lung volumes. Mild elevation right hemidiaphragm. Right basilar  densities favoring atelectasis. Cardiomediastinal contour is normal.  Calcified left hilar lymph nodes. No pleural effusion or pneumothorax.  No acute regional bony pathology. Right upper quadrant surgical clips.       Impression:      1. Low lung volumes with right basilar atelectasis and mild elevation  right hemidiaphragm. No pleural effusion or pneumothorax.        This report was signed and finalized on 6/7/2025 12:07 PM by Dr. Blanka Pak MD.       CT Head Without Contrast [[id number]] Collected: 06/07/25 1119     Updated: 06/07/25 1124    Narrative:      CT HEAD WO CONTRAST-      HISTORY: ams      COMPARISON: 12/18/2024     TOTAL DOSE LENGTH PRODUCT: 736.6 mGy.cm. Automated exposure control was  also utilized to decrease patient radiation dose.     TECHNIQUE: Axial images of the brain are obtained without contrast     FINDINGS:  Similar moderate generalized volume loss with stable appearing  bilateral periventricular and white matter hypodensities. No  intracranial hemorrhage or mass effect. No convincing acute signs of  ischemia. No extra-axial hematoma or subarachnoid hemorrhage.     Chronic opacification right maxillary sinus. Mastoid air cells  well-aerated. Bony calvarium appears intact.       Impression:         1. No acute intracranial abnormality identified. Similar moderate  generalized volume loss with  chronic small vessel ischemic change.  Chronic opacified right maxillary sinus.     This report was signed and finalized on 6/7/2025 11:21 AM by Dr. Blanka Pak MD.                 Assessment / Plan   Assessment:   Active Hospital Problems    Diagnosis     **Altered mental status     TOMÁS (acute kidney injury)     Acute UTI (urinary tract infection)     Essential hypertension     Anemia        Treatment Plan  The patient will be admitted to Dr. James's service here at [facility].     .  1.  Altered mental status-Neurochecks every 4 hours.   consult for possible discharge back to SNF when medically stable.    2.  TOMÁS (acute kidney injury)-monitor BMP daily.  Normal saline at 75 cc/h x 2 days.  Monitor intake and output.    3.  Acute UTI-Rocephin 1000 mg IV every day x 5 days.      4.  Essential hypertension-Monitor blood pressures per hospital policy.  Adjust medications as needed.    5.  Anemia-Anemia studies ordered, type and screen.  Recheck CBC daily.  Stool for occult blood ordered.      Medical Decision Making  Altered mental status-acute, moderate complexity, unchanged  TOMÁS (acute kidney injury)-acute, moderate complexity, unchanged  Acute UTI-acute, moderate complexity, unchanged  Essential hypertension-chronic, moderate complexity, stable  Anemia-acute, moderate complexity, unchanged        Number and Complexity of problems: 5  Differential Diagnosis: none    Conditions and Status         Condition is unchanged.     [facility] Data  External documents reviewed: Epic records  Cardiac tracing (EKG, telemetry) interpretation: EKG per cardiology 6/7/2025  Radiology interpretation: CT of head, chest x-ray 6/7/2025 per radiology  Labs reviewed: CBC, CMP, magnesium, CK, urinalysis 6/7/2025  Any tests that were considered but not ordered: none     Decision rules/scores evaluated (example [id number], Wells, etc): SIRS, Sepsis, and Septic Shock Criteria - MDCalc  Calculated on Jun 07 2025 5:16 PM  This patient does not meet SIRS criteria.      Discussed with: Pierce and Dr. James     Care Planning  Shared decision making: Pierce and Dr. James  Code status and discussions: DNR-DNI    Disposition  Social Determinants of Health that impact treatment or disposition:  May need SNF.  Estimated length of stay is 2-3 days.     I confirmed that the patient's advanced care plan is present, code status is documented, and a surrogate decision maker is listed in the patient's medical record.     The patient's surrogate decision maker is pierce Lola.     The patient was seen and examined by me on 06/07/2025 at 14:18.    Electronically signed by MORGAN Quintero, 06/07/25, 16:17 CDT.

## 2025-06-08 LAB
ANION GAP SERPL CALCULATED.3IONS-SCNC: 10 MMOL/L (ref 5–15)
BASOPHILS # BLD AUTO: 0.02 10*3/MM3 (ref 0–0.2)
BASOPHILS NFR BLD AUTO: 0.3 % (ref 0–1.5)
BUN SERPL-MCNC: 47.3 MG/DL (ref 8–23)
BUN/CREAT SERPL: 16.3 (ref 7–25)
CALCIUM SPEC-SCNC: 8.5 MG/DL (ref 8.6–10.5)
CHLORIDE SERPL-SCNC: 110 MMOL/L (ref 98–107)
CO2 SERPL-SCNC: 18 MMOL/L (ref 22–29)
CREAT SERPL-MCNC: 2.9 MG/DL (ref 0.57–1)
DEPRECATED RDW RBC AUTO: 44.8 FL (ref 37–54)
EGFRCR SERPLBLD CKD-EPI 2021: 15.7 ML/MIN/1.73
EOSINOPHIL # BLD AUTO: 0.27 10*3/MM3 (ref 0–0.4)
EOSINOPHIL NFR BLD AUTO: 3.4 % (ref 0.3–6.2)
ERYTHROCYTE [DISTWIDTH] IN BLOOD BY AUTOMATED COUNT: 13.5 % (ref 12.3–15.4)
FOLATE SERPL-MCNC: 7.08 NG/ML (ref 4.78–24.2)
GLUCOSE SERPL-MCNC: 117 MG/DL (ref 65–99)
HCT VFR BLD AUTO: 24.1 % (ref 34–46.6)
HEMOCCULT STL QL: NEGATIVE
HGB BLD-MCNC: 7.5 G/DL (ref 12–15.9)
IMM GRANULOCYTES # BLD AUTO: 0.04 10*3/MM3 (ref 0–0.05)
IMM GRANULOCYTES NFR BLD AUTO: 0.5 % (ref 0–0.5)
IRON 24H UR-MRATE: 36 MCG/DL (ref 37–145)
IRON SATN MFR SERPL: 19 % (ref 20–50)
LYMPHOCYTES # BLD AUTO: 1.35 10*3/MM3 (ref 0.7–3.1)
LYMPHOCYTES NFR BLD AUTO: 17.1 % (ref 19.6–45.3)
MCH RBC QN AUTO: 28.5 PG (ref 26.6–33)
MCHC RBC AUTO-ENTMCNC: 31.1 G/DL (ref 31.5–35.7)
MCV RBC AUTO: 91.6 FL (ref 79–97)
MONOCYTES # BLD AUTO: 0.69 10*3/MM3 (ref 0.1–0.9)
MONOCYTES NFR BLD AUTO: 8.7 % (ref 5–12)
NEUTROPHILS NFR BLD AUTO: 5.52 10*3/MM3 (ref 1.7–7)
NEUTROPHILS NFR BLD AUTO: 70 % (ref 42.7–76)
NRBC BLD AUTO-RTO: 0 /100 WBC (ref 0–0.2)
PLATELET # BLD AUTO: 185 10*3/MM3 (ref 140–450)
PMV BLD AUTO: 10.3 FL (ref 6–12)
POTASSIUM SERPL-SCNC: 4.7 MMOL/L (ref 3.5–5.2)
PTH-INTACT SERPL-MCNC: 39.3 PG/ML (ref 15–65)
RBC # BLD AUTO: 2.63 10*6/MM3 (ref 3.77–5.28)
SODIUM SERPL-SCNC: 138 MMOL/L (ref 136–145)
SODIUM UR-SCNC: 41 MMOL/L
TIBC SERPL-MCNC: 188 MCG/DL (ref 298–536)
TRANSFERRIN SERPL-MCNC: 126 MG/DL (ref 200–360)
URATE SERPL-MCNC: 6.5 MG/DL (ref 2.4–5.7)
VIT B12 BLD-MCNC: 335 PG/ML (ref 211–946)
WBC NRBC COR # BLD AUTO: 7.89 10*3/MM3 (ref 3.4–10.8)

## 2025-06-08 PROCEDURE — 84466 ASSAY OF TRANSFERRIN: CPT | Performed by: INTERNAL MEDICINE

## 2025-06-08 PROCEDURE — 82272 OCCULT BLD FECES 1-3 TESTS: CPT | Performed by: NURSE PRACTITIONER

## 2025-06-08 PROCEDURE — 25010000002 CEFTRIAXONE PER 250 MG: Performed by: NURSE PRACTITIONER

## 2025-06-08 PROCEDURE — 87040 BLOOD CULTURE FOR BACTERIA: CPT | Performed by: STUDENT IN AN ORGANIZED HEALTH CARE EDUCATION/TRAINING PROGRAM

## 2025-06-08 PROCEDURE — 83540 ASSAY OF IRON: CPT | Performed by: INTERNAL MEDICINE

## 2025-06-08 PROCEDURE — 25810000003 SODIUM CHLORIDE 0.9 % SOLUTION: Performed by: NURSE PRACTITIONER

## 2025-06-08 PROCEDURE — 84300 ASSAY OF URINE SODIUM: CPT | Performed by: INTERNAL MEDICINE

## 2025-06-08 PROCEDURE — 84550 ASSAY OF BLOOD/URIC ACID: CPT | Performed by: INTERNAL MEDICINE

## 2025-06-08 PROCEDURE — 82043 UR ALBUMIN QUANTITATIVE: CPT | Performed by: INTERNAL MEDICINE

## 2025-06-08 PROCEDURE — 82570 ASSAY OF URINE CREATININE: CPT | Performed by: INTERNAL MEDICINE

## 2025-06-08 PROCEDURE — 80048 BASIC METABOLIC PNL TOTAL CA: CPT | Performed by: NURSE PRACTITIONER

## 2025-06-08 PROCEDURE — 85025 COMPLETE CBC W/AUTO DIFF WBC: CPT | Performed by: NURSE PRACTITIONER

## 2025-06-08 PROCEDURE — 25810000003 SODIUM CHLORIDE 0.9 % SOLUTION: Performed by: STUDENT IN AN ORGANIZED HEALTH CARE EDUCATION/TRAINING PROGRAM

## 2025-06-08 PROCEDURE — 83970 ASSAY OF PARATHORMONE: CPT | Performed by: INTERNAL MEDICINE

## 2025-06-08 RX ADMIN — SERTRALINE HYDROCHLORIDE 25 MG: 50 TABLET ORAL at 10:08

## 2025-06-08 RX ADMIN — SODIUM CHLORIDE 75 ML/HR: 9 INJECTION, SOLUTION INTRAVENOUS at 00:44

## 2025-06-08 RX ADMIN — GABAPENTIN 300 MG: 300 CAPSULE ORAL at 10:08

## 2025-06-08 RX ADMIN — SODIUM CHLORIDE 500 ML: 9 INJECTION, SOLUTION INTRAVENOUS at 11:13

## 2025-06-08 RX ADMIN — ATORVASTATIN CALCIUM 40 MG: 40 TABLET, FILM COATED ORAL at 10:08

## 2025-06-08 RX ADMIN — Medication 10 ML: at 20:32

## 2025-06-08 RX ADMIN — CEFTRIAXONE SODIUM 1000 MG: 1 INJECTION, POWDER, FOR SOLUTION INTRAMUSCULAR; INTRAVENOUS at 15:45

## 2025-06-08 RX ADMIN — LEVOTHYROXINE SODIUM 50 MCG: 0.05 TABLET ORAL at 07:46

## 2025-06-08 RX ADMIN — Medication 10 ML: at 10:08

## 2025-06-08 RX ADMIN — SODIUM BICARBONATE: 84 INJECTION, SOLUTION INTRAVENOUS at 16:45

## 2025-06-08 RX ADMIN — PANTOPRAZOLE SODIUM 40 MG: 40 TABLET, DELAYED RELEASE ORAL at 05:15

## 2025-06-08 RX ADMIN — GABAPENTIN 300 MG: 300 CAPSULE ORAL at 20:31

## 2025-06-08 RX ADMIN — DONEPEZIL HYDROCHLORIDE 10 MG: 10 TABLET, FILM COATED ORAL at 20:31

## 2025-06-08 NOTE — PLAN OF CARE
Goal Outcome Evaluation:      Patient a&o x2, B/P has been soft, 0.9 NS bolus given, Afebrile, Patient started on 0.45 NS with bicarb, infusing w/o difficulty. Incont of B&B, pure wick in place and incont brief. Patient has slept most of shift. Turned and repositioned, All safety maintained and call light in reach.

## 2025-06-08 NOTE — PLAN OF CARE
Goal Outcome Evaluation:  Plan of Care Reviewed With: patient        Progress: no change  Outcome Evaluation: Patient A/O to self only over night. VSS. NSR on telemetry. Voiding per [facility]. No PRNs requested. Able to sleep well. Safety maintained.

## 2025-06-08 NOTE — CASE MANAGEMENT/SOCIAL WORK
Continued Stay Note   [place]     Patient Name: Jennifer Gomes  MRN: [id number]  Today's Date: 6/8/2025    Admit Date: 6/7/2025    Plan: Referral to [facility]   Discharge Plan       Row Name 06/08/25 1013       Plan    Plan Referral to [facility]    Patient/Family in Agreement with Plan yes    Plan Comments Spoke with Lola Liang Daughter [phone] she stated pt was at [facility] in past but ins. stopped covering so pt returned home. She wants to pursue this option again, once therapy notes available (pt just admitted late last night) referral will be sent. Therapy not ordered yet so sending message to hospitalist service. Will follow.                   Discharge Codes    No documentation.                       SARAH ArandaW

## 2025-06-08 NOTE — PROGRESS NOTES
[facility] Medicine Services  INPATIENT PROGRESS NOTE    Patient Name: Jennifer Gomes  Date of Admission: 6/7/2025  Today's Date: 06/08/25  Length of Stay: 1  Primary Care Physician: Katy Leone DO    Subjective   Chief Complaint: none today   HPI   No acute events overnight. Patient is no longer confused. Her  and daughter are at the bedside.   Daughter asking for home health at discharge.     Review of Systems   All pertinent negatives and positives are as above. All other systems have been reviewed and are negative unless otherwise stated.     Objective    Temp:  [97 °F (36.1 °C)-98.3 °F (36.8 °C)] 97.6 °F (36.4 °C)  Heart Rate:  [69-90] 90  Resp:  [14-18] 14  BP: ()/(32-53) 94/37  Physical Exam  GEN: Awake, alert, interactive, ill-appearing, in NAD   HEENT: Atraumatic, EOMI, Anicteric  Lungs: CTAB, no wheezing/rales/rhonchi  Heart: RRR, +S1/s2, no rub  ABD: soft, nt/nd, +BS, no guarding/rebound  Extremities: atraumatic, no cyanosis, no edema  Skin: no rashes or lesions  Neuro: AAOx3, no focal deficits  Psych: normal mood & affect      Results Review:  I have reviewed the labs, radiology results, and diagnostic studies.    Laboratory Data:   Results from last 7 days   Lab Units 06/08/25  0342 06/07/25  1255   WBC 10*3/mm3 7.89 7.13   HEMOGLOBIN g/dL 7.5* 7.8*   HEMATOCRIT % 24.1* 24.6*   PLATELETS 10*3/mm3 185 208        Results from last 7 days   Lab Units 06/08/25  0342 06/07/25  1255   SODIUM mmol/L 138 138   POTASSIUM mmol/L 4.7 4.6   CHLORIDE mmol/L 110* 106   CO2 mmol/L 18.0* 21.0*   BUN mg/dL 47.3* 48.7*   CREATININE mg/dL 2.90* 2.77*   CALCIUM mg/dL 8.5* 9.3   BILIRUBIN mg/dL  --  <0.2   ALK PHOS U/L  --  60   ALT (SGPT) U/L  --  6   AST (SGOT) U/L  --  12   GLUCOSE mg/dL 117* 103*       Culture Data:   @[facility]@    Radiology Data:   Imaging Results (Last 24 Hours)       Procedure Component Value Units Date/Time    XR Chest 1 View [[id number]]  Collected: 06/07/25 1205     Updated: 06/07/25 1210    Narrative:      XR CHEST 1 VW-     HISTORY: fall     COMPARISON: 12/18/2024     FINDINGS: Frontal view of the chest obtained.     Low lung volumes. Mild elevation right hemidiaphragm. Right basilar  densities favoring atelectasis. Cardiomediastinal contour is normal.  Calcified left hilar lymph nodes. No pleural effusion or pneumothorax.  No acute regional bony pathology. Right upper quadrant surgical clips.       Impression:      1. Low lung volumes with right basilar atelectasis and mild elevation  right hemidiaphragm. No pleural effusion or pneumothorax.        This report was signed and finalized on 6/7/2025 12:07 PM by Dr. Blanka Pak MD.       CT Head Without Contrast [[id number]] Collected: 06/07/25 1119     Updated: 06/07/25 1124    Narrative:      CT HEAD WO CONTRAST-      HISTORY: ams      COMPARISON: 12/18/2024     TOTAL DOSE LENGTH PRODUCT: 736.6 mGy.cm. Automated exposure control was  also utilized to decrease patient radiation dose.     TECHNIQUE: Axial images of the brain are obtained without contrast     FINDINGS: Similar moderate generalized volume loss with stable appearing  bilateral periventricular and white matter hypodensities. No  intracranial hemorrhage or mass effect. No convincing acute signs of  ischemia. No extra-axial hematoma or subarachnoid hemorrhage.     Chronic opacification right maxillary sinus. Mastoid air cells  well-aerated. Bony calvarium appears intact.       Impression:         1. No acute intracranial abnormality identified. Similar moderate  generalized volume loss with  chronic small vessel ischemic change.  Chronic opacified right maxillary sinus.     This report was signed and finalized on 6/7/2025 11:21 AM by Dr. Blanka Pak MD.               I have reviewed the patient's current medications.     Assessment/Plan   Assessment  Active Hospital Problems    Diagnosis     **Altered mental status     TOMÁS (acute  kidney injury)     Acute UTI (urinary tract infection)     Essential hypertension     Anemia        Treatment Plan    # Altered status - resolved  # E.coli UTI  - continue ceftriaxone day 2  - Follow urine culture sensitivities  - BP on the lower side today, although patient is asymptomatic.  Checking blood cultures as well    # Acute kidney injury  Creatinine baseline 1.31 on December 2025  Creatinine on admission 2.56>> 2.9 today  - Has been on IV fluids  - Given no improvement in creatinine will consult nephrology    # Hypertension, now hypotensive  - Giving additional bolus of 500 cc today  - Holding home antihypertensives    # Anemia of chronic disease - monitor       Medical Decision Making  Number and Complexity of problems: 3 acute moderate complexity, others chronic and stable  Differential Diagnosis: As above    Conditions and Status        Improved.     MDM Data  External documents reviewed: Reviewed  Cardiac tracing (EKG, telemetry) interpretation: Reviewed  Radiology interpretation: Reviewed  Labs reviewed: As above  Any tests that were considered but not ordered: None     Decision rules/scores evaluated (example [id number], Wells, etc): None     Discussed with: Patient and her daughter     Care Planning  Shared decision making: Patient apprised of current labs, vitals, imaging and treatment plan.  They are agreeable with proceeding with plans as discussed.   Code status and discussions: No CPR    Disposition  Social Determinants of Health that impact treatment or disposition: None  I expect the patient to be discharged to home with HH in 3-4 days.         Electronically signed by Viji James MD, 06/08/25, 10:37 CDT.

## 2025-06-08 NOTE — CONSULTS
Nephrology ([facility]) Consult Note      Patient:  Jennifer Gomes  YOB: 1940  Date of Service: 6/8/2025  MRN: [id number]   Acct: [id number]   Primary Care Physician: Katy Leone DO  Advance Directive:   Code Status and Medical Interventions: No CPR (Do Not Attempt to Resuscitate); Limited Support; No intubation (DNI)   Ordered at: 06/07/25 1414     Code Status (Patient has no pulse and is not breathing):    No CPR (Do Not Attempt to Resuscitate)     Medical Interventions (Patient has pulse or is breathing):    Limited Support     Medical Intervention Limits:    No intubation (DNI)     Level Of Support Discussed With:    Health Care Surrogate     Admit Date: 6/7/2025       Hospital Day: 1  Referring Provider: No Known Provider      Patient Seen, Chart, Consults, Notes, Labs, Radiology studies reviewed.    Chief complaint: Abnormal labs.    Subjective:  Jennifer Gomes is a 84 y.o. female  whom we were consulted for acute kidney injury/chronic kidney disease.  She has history of chronic kidney disease stage unknown.  She never followed nephrology.  Patient also has history of hypertension, GERD, fibromyalgia, depression, sleep apnea and dementia.  Patient presented to the emergency room as she was brought in by family as she was confused/disoriented.  She was diagnosed with urinary tract infection and found to have worsening of renal function with serum creatinine of 2.9 mg.  Hospital course remarkable for treatment with IV antibiotics and has significant improvement of mental status.  However she has not shown any improvement of renal function.    This afternoon she is sitting up and eating lunch.  She is surrounded by family members including her  as well as daughter.  Patient is alert and awake and very pleasant and was able to answer all the questions.  She denies any nausea or vomiting or use of any nonsteroidal agents    Allergies:  Ropinirole hcl, Codeine,  Definity [perflutren lipid microsphere], Ambien [zolpidem], Eszopiclone, Pregabalin, and Tizanidine    Home Meds:  Medications Prior to Admission   Medication Sig Dispense Refill Last Dose/Taking    acetaminophen (TYLENOL) 325 MG tablet Take 2 tablets by mouth Every 6 (Six) Hours As Needed for Mild Pain.       amLODIPine (NORVASC) 5 MG tablet Take 1 tablet by mouth Daily.       atorvastatin (LIPITOR) 40 MG tablet Take 1 tablet by mouth Daily.       butalbital-acetaminophen-caffeine (FIORICET, ESGIC) -40 MG per tablet Take 1 tablet by mouth Every 12 (Twelve) Hours. (Patient not taking: Reported on 4/9/2025)       carvedilol (COREG) 3.125 MG tablet Take 1 tablet by mouth 2 (Two) Times a Day With Meals for 30 days. 60 tablet 2     cyclobenzaprine (FLEXERIL) 10 MG tablet Take 1 tablet by mouth Every 12 (Twelve) Hours.       donepezil (ARICEPT) 10 MG tablet Take 1 tablet by mouth Every Night.       ergocalciferol (ERGOCALCIFEROL) 61031 units capsule Take 1 capsule by mouth 1 (One) Time Per Week. Saturday       gabapentin (NEURONTIN) 300 MG capsule TAKE 1 CAPSULE BY MOUTH DAILY FOR 2 WEEKS, AND THEN TAKE 1 CAPSULE BY MOUTH TWICE DAILY THEREAFTER.       lansoprazole (PREVACID) 30 MG capsule Take 1 capsule by mouth Daily.       levothyroxine (SYNTHROID, LEVOTHROID) 50 MCG tablet Take 1 tablet by mouth Daily.       naloxone (NARCAN) 4 MG/0.1ML nasal spray Administer 1 spray into the nostril(s) as directed by provider As Needed for Opioid Reversal. (Patient not taking: Reported on 4/9/2025)       oxyCODONE (ROXICODONE) 15 MG immediate release tablet TAKE 1 TABLET BY MOUTH 3 TIMES A DAY. MUST LAST 30 DAYS.       sertraline (ZOLOFT) 25 MG tablet Take 1 tablet by mouth Daily. 90 tablet 0        Medicines:  Current Facility-Administered Medications   Medication Dose Route Frequency Provider Last Rate Last Admin    acetaminophen (TYLENOL) tablet 650 mg  650 mg Oral Q4H PRN Menendez, Holli L, APRN        Or    acetaminophen  (TYLENOL) 160 MG/5ML oral solution 650 mg  650 mg Oral Q4H PRN Holli Menendez APRN        Or    acetaminophen (TYLENOL) suppository 650 mg  650 mg Rectal Q4H PRN Holli Menendez APRN        [Held by provider] amLODIPine (NORVASC) tablet 5 mg  5 mg Oral Q24H Holli Menendez APRN        atorvastatin (LIPITOR) tablet 40 mg  40 mg Oral Daily Holli Menendez APRN   40 mg at 06/08/25 1008    sennosides-docusate (PERICOLACE) 8.6-50 MG per tablet 2 tablet  2 tablet Oral BID PRN Holli Menendez APRN        And    polyethylene glycol (MIRALAX) packet 17 g  17 g Oral Daily PRN Holli Menendez APRN        And    bisacodyl (DULCOLAX) EC tablet 5 mg  5 mg Oral Daily PRN Holli Menendez APRN        And    bisacodyl (DULCOLAX) suppository 10 mg  10 mg Rectal Daily PRN Holli Menendez APRN        Calcium Replacement - Follow Nurse / BPA Driven Protocol   Not Applicable PRN Holli Menendez APRN        [Held by provider] carvedilol (COREG) tablet 3.125 mg  3.125 mg Oral BID With Meals Holli Menendez APRN        cefTRIAXone (ROCEPHIN) 1,000 mg in sodium chloride 0.9 % 100 mL MBP  1,000 mg Intravenous Q24H Holli Menendez APRN        donepezil (ARICEPT) tablet 10 mg  10 mg Oral Nightly Holli Menendez APRN   10 mg at 06/07/25 2151    gabapentin (NEURONTIN) capsule 300 mg  300 mg Oral Q12H Holli Menendez APRN   300 mg at 06/08/25 1008    levothyroxine (SYNTHROID, LEVOTHROID) tablet 50 mcg  50 mcg Oral QAM Holli Menendez APRN   50 mcg at 06/08/25 0746    Magnesium Standard Dose Replacement - Follow Nurse / BPA Driven Protocol   Not Applicable PRN Holli Menendez APRN        nitroglycerin (NITROSTAT) SL tablet 0.4 mg  0.4 mg Sublingual Q5 Min PRN Holli Menendez APRN        ondansetron ODT (ZOFRAN-ODT) disintegrating tablet 4 mg  4 mg Oral Q6H PRN Holli Menendez APRN        Or    ondansetron (ZOFRAN) injection 4 mg  4 mg Intravenous Q6H PRN Holli Menendez APRN        oxyCODONE (ROXICODONE) immediate release tablet  15 mg  15 mg Oral TID PRN MenendezHolli tipton L, APRN        pantoprazole (PROTONIX) EC tablet 40 mg  40 mg Oral Q AM MenendezHolli, APRN   40 mg at 25 0515    Phosphorus Replacement - Follow Nurse / BPA Driven Protocol   Not Applicable PRN Menendez, Holli L, APRN        Potassium Replacement - Follow Nurse / BPA Driven Protocol   Not Applicable PRN MenendezHolli L, APRN        sertraline (ZOLOFT) tablet 25 mg  25 mg Oral Daily MenendezHolli, APRN   25 mg at 25 1008    sodium chloride 0.9 % flush 10 mL  10 mL Intravenous Q12H MenendezHolli, APRN   10 mL at 25 1008    sodium chloride 0.9 % flush 10 mL  10 mL Intravenous PRN Holli Menendez, APRN        sodium chloride 0.9 % infusion 40 mL  40 mL Intravenous PRN Holli Menendez, APRN        sodium chloride 0.9 % infusion  75 mL/hr Intravenous Continuous Holli Menendez, APRN 75 mL/hr at 25 0044 75 mL/hr at 25 0044       Past Medical History:  Past Medical History:   Diagnosis Date    Anxiety     Arthritis     Bronchitis     Cancer     uterine    Chronic pain     Depression     Disease of thyroid gland     Fibromyalgia     GERD (gastroesophageal reflux disease)     Headache     History of transfusion     AS     Hyperlipidemia     Hypertension     Incontinence     Insomnia     Leg pain     Lumbar stenosis     Migraines     Peptic ulcer     Restless legs     Sleep apnea     NO C-PAP    UTI (urinary tract infection)     Vaginal bleeding        Past Surgical History:  Past Surgical History:   Procedure Laterality Date    BLADDER REPAIR      MESH HAD TO BE REMOVED IN     BREAST BIOPSY Right 2017    benign    BREAST CYST EXCISION Left     CARDIAC CATHETERIZATION      CARPAL TUNNEL RELEASE      CATARACT EXTRACTION W/ INTRAOCULAR LENS  IMPLANT, BILATERAL      CHOLECYSTECTOMY WITH INTRAOPERATIVE CHOLANGIOGRAM N/A 2023    Procedure: CHOLECYSTECTOMY LAPAROSCOPIC INTRAOPERATIVE CHOLANGIOGRAM;  Surgeon: Gerardo Arciniega MD;   Location:  [facility] OR;  Service: General;  Laterality: N/A;    COLONOSCOPY      COLONOSCOPY N/A 10/01/2021    Procedure: COLONOSCOPY WITH ANESTHESIA;  Surgeon: Tom Velasco DO;  Location: [facility] ENDOSCOPY;  Service: Gastroenterology;  Laterality: N/A;  pre: change in bowel habits  post: diverticulosis. hemorrhoids.   Olivia Mora, APRN        CYSTECTOMY      D & C HYSTEROSCOPY N/A 11/06/2017    Procedure: DILATATION AND CURETTAGE HYSTEROSCOPY;  Surgeon: Shasta Madrigal MD;  Location: [facility] OR;  Service:     DILATION AND CURETTAGE, DIAGNOSTIC / THERAPEUTIC  2008    ENDOSCOPY  09/23/2010    Short segment of Arriola's,Moderate chroninc esophagogastritis and negative H.pylori    ENDOSCOPY N/A 09/25/2017    Procedure: ESOPHAGOGASTRODUODENOSCOPY WITH ANESTHESIA;  Surgeon: Tom Velasco DO;  Location: [facility] ENDOSCOPY;  Service:     EYE SURGERY      RETINA    HEMORRHOIDECTOMY SIGMOIDOSCOPY N/A 3/21/2023    Procedure: HEMORRHOIDECTOMY WITH EXAM UNDER ANESTHESIA;  Surgeon: Holly Chavez MD;  Location: [facility] OR;  Service: General;  Laterality: N/A;    HYSTERECTOMY  12/20/2017    ORIF TIBIA/FIBULA FRACTURES Left 2000    TRANSVAGINAL TAPING SUSPENSION N/A 11/06/2017    Procedure: VAGINAL MESH REVISION;  Surgeon: Shasta Madrigal MD;  Location: [facility] OR;  Service:     VAGINAL MESH REVISION  2013       Family History  Family History   Problem Relation Age of Onset    Diabetes Mother     Multiple myeloma Mother     Stroke Father     Diabetes Sister     Prostate cancer Brother     Lymphoma Brother         NHL    Ovarian cancer Paternal Aunt     Cancer Paternal Grandmother         metastatic    Lung cancer Paternal Grandfather     Colon cancer Neg Hx     Esophageal cancer Neg Hx     Breast cancer Neg Hx        Social History  Social History     Socioeconomic History    Marital status:    Tobacco Use    Smoking status: Never    Smokeless tobacco: Never   Vaping Use    Vaping status: Never Used   Substance  "and Sexual Activity    Alcohol use: Not Currently     Comment: occasional    Drug use: No    Sexual activity: Defer         Review of Systems:  History obtained from chart review and the patient  General ROS: No fever or chills  Respiratory ROS: No cough, shortness of breath, wheezing  Cardiovascular ROS: no chest pain or dyspnea on exertion  Gastrointestinal ROS: No abdominal pain or melena  Genito-Urinary ROS: No dysuria or hematuria  14 point ROS reviewed with the patient and negative except as noted above and in the HPI unless unable to obtain.    Objective:  BP (!) 104/35 (BP Location: Right arm, Patient Position: Lying)   Pulse 75   Temp 97.5 °F (36.4 °C) (Axillary)   Resp 14   Ht 157.5 cm (62\")   Wt 60.8 kg (134 lb)   LMP  (LMP Unknown)   SpO2 96%   BMI 24.51 kg/m²     Intake/Output Summary (Last 24 hours) at 6/8/2025 1354  Last data filed at 6/8/2025 0420  Gross per 24 hour   Intake 240 ml   Output 850 ml   Net -610 ml     General: awake/alert   HEENT: Normocephalic atraumatic head  Neck: Supple with no JVD or carotid bruits.  Chest:  clear to auscultation bilaterally without respiratory distress  CVS: regular rate and rhythm  Abdominal: soft, nontender, normal bowel sounds  Extremities: no cyanosis or edema  Skin: warm and dry without rash      Labs:    Results from last 7 days   Lab Units 06/08/25  0342 06/07/25  1255   WBC 10*3/mm3 7.89 7.13   HEMOGLOBIN g/dL 7.5* 7.8*   HEMATOCRIT % 24.1* 24.6*   PLATELETS 10*3/mm3 185 208       Results from last 7 days   Lab Units 06/08/25  0342 06/07/25  1255   SODIUM mmol/L 138 138   POTASSIUM mmol/L 4.7 4.6   CHLORIDE mmol/L 110* 106   CO2 mmol/L 18.0* 21.0*   BUN mg/dL 47.3* 48.7*   CREATININE mg/dL 2.90* 2.77*   CALCIUM mg/dL 8.5* 9.3   BILIRUBIN mg/dL  --  <0.2   ALK PHOS U/L  --  60   ALT (SGPT) U/L  --  6   AST (SGOT) U/L  --  12   GLUCOSE mg/dL 117* 103*   EGFR mL/min/1.73 15.7* 16.6*         Radiology:   Imaging Results (Last 24 Hours)       ** No " "results found for the last 24 hours. **            Culture:  No components found for: \"WOUNDCUL\", \"3\"  No components found for: \"CSFCUL\", \"3\"  No components found for: \"BC\", \"3\"  No components found for: \"URINECUL\", \"3\"      Assessment   1.  Acute kidney injury/worsening.  2.  Acute tubular necrosis versus prerenal azotemia.  3.  Metabolic encephalopathy improving.  4..  Acute urinary tract infection  5.  Metabolic acidemia.  6.  Anemia possibly of CKD/iron deficiency.  7.  History of dementia  8.  Chronic kidney disease stage unknown.  9.  History of hypertension/currently hypotensive  10.  Patient is DNR/DNI.    Plan:  1.  Recommended to initiate IV fluid with sodium bicarbonate for correction of metabolic acidosis as well as possible element of prerenal azotemia.  2.  I will get urinary electrolytes/UACR, serum PTH level as well as iron studies.  3.  I have requested a baseline renal ultrasound to look for size of the contractures of the kidney.  4.  Agree to hold antihypertensive medication as her blood pressure is still borderline low.  5.  Plan was extensively discussed with patient, family at the      Thank you for the consult, we appreciate the opportunity to provide care to your patients.  Feel free to contact me if I can be of any further assistance.      Grupo Ag MD  6/8/2025  13:54 CDT  "

## 2025-06-09 ENCOUNTER — APPOINTMENT (OUTPATIENT)
Dept: ULTRASOUND IMAGING | Facility: HOSPITAL | Age: 85
DRG: 690 | End: 2025-06-09
Payer: MEDICARE

## 2025-06-09 LAB
ALBUMIN SERPL-MCNC: 2.6 G/DL (ref 3.5–5.2)
ALBUMIN UR-MCNC: 3.2 MG/DL
ALBUMIN/GLOB SERPL: 0.9 G/DL
ALP SERPL-CCNC: 48 U/L (ref 39–117)
ALT SERPL W P-5'-P-CCNC: <5 U/L (ref 1–33)
ANION GAP SERPL CALCULATED.3IONS-SCNC: 6 MMOL/L (ref 5–15)
AST SERPL-CCNC: 6 U/L (ref 1–32)
BACTERIA SPEC AEROBE CULT: ABNORMAL
BASOPHILS # BLD AUTO: 0.01 10*3/MM3 (ref 0–0.2)
BASOPHILS NFR BLD AUTO: 0.2 % (ref 0–1.5)
BILIRUB SERPL-MCNC: <0.2 MG/DL (ref 0–1.2)
BUN SERPL-MCNC: 47.3 MG/DL (ref 8–23)
BUN/CREAT SERPL: 17.2 (ref 7–25)
CALCIUM SPEC-SCNC: 8.5 MG/DL (ref 8.6–10.5)
CHLORIDE SERPL-SCNC: 114 MMOL/L (ref 98–107)
CO2 SERPL-SCNC: 19 MMOL/L (ref 22–29)
CREAT SERPL-MCNC: 2.75 MG/DL (ref 0.57–1)
CREAT UR-MCNC: 75 MG/DL
DEPRECATED RDW RBC AUTO: 47.2 FL (ref 37–54)
EGFRCR SERPLBLD CKD-EPI 2021: 16.7 ML/MIN/1.73
EOSINOPHIL # BLD AUTO: 0.24 10*3/MM3 (ref 0–0.4)
EOSINOPHIL NFR BLD AUTO: 4.1 % (ref 0.3–6.2)
ERYTHROCYTE [DISTWIDTH] IN BLOOD BY AUTOMATED COUNT: 13.6 % (ref 12.3–15.4)
GLOBULIN UR ELPH-MCNC: 3 GM/DL
GLUCOSE SERPL-MCNC: 100 MG/DL (ref 65–99)
HCT VFR BLD AUTO: 23.5 % (ref 34–46.6)
HGB BLD-MCNC: 7 G/DL (ref 12–15.9)
IMM GRANULOCYTES # BLD AUTO: 0.02 10*3/MM3 (ref 0–0.05)
IMM GRANULOCYTES NFR BLD AUTO: 0.3 % (ref 0–0.5)
LYMPHOCYTES # BLD AUTO: 1.5 10*3/MM3 (ref 0.7–3.1)
LYMPHOCYTES NFR BLD AUTO: 25.6 % (ref 19.6–45.3)
MCH RBC QN AUTO: 28.3 PG (ref 26.6–33)
MCHC RBC AUTO-ENTMCNC: 29.8 G/DL (ref 31.5–35.7)
MCV RBC AUTO: 95.1 FL (ref 79–97)
MICROALBUMIN/CREAT UR: 42.7 MG/G (ref 0–29)
MONOCYTES # BLD AUTO: 0.51 10*3/MM3 (ref 0.1–0.9)
MONOCYTES NFR BLD AUTO: 8.7 % (ref 5–12)
NEUTROPHILS NFR BLD AUTO: 3.58 10*3/MM3 (ref 1.7–7)
NEUTROPHILS NFR BLD AUTO: 61.1 % (ref 42.7–76)
NRBC BLD AUTO-RTO: 0 /100 WBC (ref 0–0.2)
PLATELET # BLD AUTO: 165 10*3/MM3 (ref 140–450)
PMV BLD AUTO: 10.3 FL (ref 6–12)
POTASSIUM SERPL-SCNC: 4.5 MMOL/L (ref 3.5–5.2)
PROT SERPL-MCNC: 5.6 G/DL (ref 6–8.5)
QT INTERVAL: 382 MS
QTC INTERVAL: 446 MS
RBC # BLD AUTO: 2.47 10*6/MM3 (ref 3.77–5.28)
SODIUM SERPL-SCNC: 139 MMOL/L (ref 136–145)
WBC NRBC COR # BLD AUTO: 5.86 10*3/MM3 (ref 3.4–10.8)

## 2025-06-09 PROCEDURE — 97162 PT EVAL MOD COMPLEX 30 MIN: CPT

## 2025-06-09 PROCEDURE — 25010000002 NA FERRIC GLUC CPLX PER 12.5 MG: Performed by: INTERNAL MEDICINE

## 2025-06-09 PROCEDURE — 25810000003 SODIUM CHLORIDE 0.9 % SOLUTION: Performed by: INTERNAL MEDICINE

## 2025-06-09 PROCEDURE — 25010000002 CEFTRIAXONE PER 250 MG: Performed by: NURSE PRACTITIONER

## 2025-06-09 PROCEDURE — 85025 COMPLETE CBC W/AUTO DIFF WBC: CPT | Performed by: NURSE PRACTITIONER

## 2025-06-09 PROCEDURE — 76775 US EXAM ABDO BACK WALL LIM: CPT

## 2025-06-09 PROCEDURE — 80053 COMPREHEN METABOLIC PANEL: CPT | Performed by: INTERNAL MEDICINE

## 2025-06-09 RX ORDER — LOPERAMIDE HYDROCHLORIDE 2 MG/1
2 CAPSULE ORAL 4 TIMES DAILY PRN
Status: DISCONTINUED | OUTPATIENT
Start: 2025-06-09 | End: 2025-06-10

## 2025-06-09 RX ORDER — CARVEDILOL 3.12 MG/1
3.12 TABLET ORAL 2 TIMES DAILY WITH MEALS
COMMUNITY

## 2025-06-09 RX ORDER — OXYCODONE HYDROCHLORIDE 15 MG/1
15 TABLET ORAL 3 TIMES DAILY
COMMUNITY

## 2025-06-09 RX ORDER — GABAPENTIN 300 MG/1
300 CAPSULE ORAL 2 TIMES DAILY
COMMUNITY

## 2025-06-09 RX ADMIN — GABAPENTIN 300 MG: 300 CAPSULE ORAL at 08:51

## 2025-06-09 RX ADMIN — SODIUM BICARBONATE: 84 INJECTION, SOLUTION INTRAVENOUS at 21:42

## 2025-06-09 RX ADMIN — LEVOTHYROXINE SODIUM 50 MCG: 0.05 TABLET ORAL at 08:51

## 2025-06-09 RX ADMIN — ATORVASTATIN CALCIUM 40 MG: 40 TABLET, FILM COATED ORAL at 08:51

## 2025-06-09 RX ADMIN — GABAPENTIN 300 MG: 300 CAPSULE ORAL at 20:04

## 2025-06-09 RX ADMIN — OXYCODONE HYDROCHLORIDE 15 MG: 5 TABLET ORAL at 08:53

## 2025-06-09 RX ADMIN — SERTRALINE HYDROCHLORIDE 25 MG: 50 TABLET ORAL at 08:51

## 2025-06-09 RX ADMIN — Medication 10 ML: at 20:04

## 2025-06-09 RX ADMIN — CEFTRIAXONE SODIUM 1000 MG: 1 INJECTION, POWDER, FOR SOLUTION INTRAMUSCULAR; INTRAVENOUS at 16:13

## 2025-06-09 RX ADMIN — SODIUM CHLORIDE 250 MG: 9 INJECTION, SOLUTION INTRAVENOUS at 17:33

## 2025-06-09 RX ADMIN — DONEPEZIL HYDROCHLORIDE 10 MG: 10 TABLET, FILM COATED ORAL at 20:03

## 2025-06-09 RX ADMIN — PANTOPRAZOLE SODIUM 40 MG: 40 TABLET, DELAYED RELEASE ORAL at 08:51

## 2025-06-09 RX ADMIN — SODIUM BICARBONATE: 84 INJECTION, SOLUTION INTRAVENOUS at 03:45

## 2025-06-09 RX ADMIN — Medication 10 ML: at 08:56

## 2025-06-09 RX ADMIN — OXYCODONE HYDROCHLORIDE 15 MG: 5 TABLET ORAL at 16:22

## 2025-06-09 NOTE — PROGRESS NOTES
Patient Name: Jennifer Gomes  Date of Admission: 6/7/2025  Today's Date: 06/09/25  Length of Stay: 2  Primary Care Physician: Katy Leone DO    Subjective   Chief Complaint: Kidney problems  HPI   Today: Patient resting in bed on room air.  Patient is alert and oriented x 2.  Disoriented to time and situation but was able to participate in my exam.  She denies any dizziness or fatigue.  She denies any pain at this time.  She does report having incontinent episodes of loose stool.  The nurse aide that was in the room taking care of the patient reports multiple loose bowel movements today.     Documented weights    06/07/25 1017   Weight: 60.8 kg (134 lb)          Intake/Output Summary (Last 24 hours) at 6/9/2025 1510  Last data filed at 6/9/2025 1050  Gross per 24 hour   Intake 600 ml   Output 1300 ml   Net -700 ml       Results for orders placed during the hospital encounter of 11/12/24    Adult Transthoracic Echo Complete W/ Cont if Necessary Per Protocol    Interpretation Summary    Left ventricular ejection fraction appears to be 66 - 70%.    Left ventricular wall thickness is consistent with mild concentric hypertrophy.    Left ventricular diastolic function is consistent with (grade Ia w/high LAP) impaired relaxation.    Left atrial volume is moderately increased.    Mild pulmonary hypertension is present.       Review of Systems   All pertinent negatives and positives are as above. All other systems have been reviewed and are negative unless otherwise stated.     Objective    Temp:  [98.2 °F (36.8 °C)-98.7 °F (37.1 °C)] 98.4 °F (36.9 °C)  Heart Rate:  [] 98  Resp:  [12-16] 14  BP: (107-139)/(28-58) 138/58  Physical Exam  Vitals reviewed.   Constitutional:       General: She is not in acute distress.  HENT:      Head: Normocephalic.      Mouth/Throat:      Mouth: Mucous membranes are moist.   Cardiovascular:      Rate and Rhythm: Normal rate.   Pulmonary:      Effort: Pulmonary effort is normal. No  respiratory distress.   Abdominal:      Palpations: Abdomen is soft.   Musculoskeletal:      Comments: IV infiltrated upper extremity   Skin:     General: Skin is warm.   Neurological:      General: No focal deficit present.      Mental Status: She is alert.      Motor: Weakness present.   Psychiatric:         Mood and Affect: Mood normal.         Results Review:  I have reviewed the labs, radiology results, and diagnostic studies.    Laboratory Data:   Results from last 7 days   Lab Units 06/09/25  0501 06/08/25  0342 06/07/25  1255   WBC 10*3/mm3 5.86 7.89 7.13   HEMOGLOBIN g/dL 7.0* 7.5* 7.8*   HEMATOCRIT % 23.5* 24.1* 24.6*   PLATELETS 10*3/mm3 165 185 208        Results from last 7 days   Lab Units 06/09/25  0501 06/08/25  0342 06/07/25  1255   SODIUM mmol/L 139 138 138   POTASSIUM mmol/L 4.5 4.7 4.6   CHLORIDE mmol/L 114* 110* 106   CO2 mmol/L 19.0* 18.0* 21.0*   BUN mg/dL 47.3* 47.3* 48.7*   CREATININE mg/dL 2.75* 2.90* 2.77*   CALCIUM mg/dL 8.5* 8.5* 9.3   BILIRUBIN mg/dL <0.2  --  <0.2   ALK PHOS U/L 48  --  60   ALT (SGPT) U/L <5  --  6   AST (SGOT) U/L 6  --  12   GLUCOSE mg/dL 100* 117* 103*       Culture Data:     Microbiology Results (last 10 days)       Procedure Component Value - Date/Time    Blood Culture With YOLA - Blood, Arm, Right [[id number]]  (Normal) Collected: 06/08/25 1043    Lab Status: Preliminary result Specimen: Blood from Arm, Right Updated: 06/09/25 1130     Blood Culture No growth at 24 hours    Urine Culture - Urine, Urine, Catheter [[id number]]  (Abnormal)  (Susceptibility) Collected: 06/07/25 1148    Lab Status: Final result Specimen: Urine, Catheter Updated: 06/09/25 0935     Urine Culture >100,000 CFU/mL Escherichia coli    Narrative:      Colonization of the urinary tract without infection is common. Treatment is discouraged unless the patient is symptomatic, pregnant, or undergoing an invasive urologic procedure.    Susceptibility        Escherichia coli      CHULA      Amoxicillin  + Clavulanate Resistant      Ampicillin Resistant      Ampicillin + Sulbactam Intermediate      Cefazolin (Urine) Susceptible      Cefepime Susceptible      Ceftazidime Susceptible      Ceftriaxone Susceptible      Cefuroxime axetil Intermediate      Gentamicin Susceptible      Levofloxacin Susceptible      Nitrofurantoin Susceptible      Piperacillin + Tazobactam Susceptible      Trimethoprim + Sulfamethoxazole Susceptible                                    Radiology Data:   Imaging Results (Last 7 Days)       Procedure Component Value Units Date/Time    US Renal Bilateral - In process [[id number]] Resulted: 06/09/25 1348     Updated: 06/09/25 1413    This result has not been signed. Information might be incomplete.      XR Chest 1 View [[id number]] Collected: 06/07/25 1205     Updated: 06/07/25 1210    Narrative:      XR CHEST 1 VW-     HISTORY: fall     COMPARISON: 12/18/2024     FINDINGS: Frontal view of the chest obtained.     Low lung volumes. Mild elevation right hemidiaphragm. Right basilar  densities favoring atelectasis. Cardiomediastinal contour is normal.  Calcified left hilar lymph nodes. No pleural effusion or pneumothorax.  No acute regional bony pathology. Right upper quadrant surgical clips.       Impression:      1. Low lung volumes with right basilar atelectasis and mild elevation  right hemidiaphragm. No pleural effusion or pneumothorax.        This report was signed and finalized on 6/7/2025 12:07 PM by Dr. Blanka Pak MD.       CT Head Without Contrast [[id number]] Collected: 06/07/25 1119     Updated: 06/07/25 1124    Narrative:      CT HEAD WO CONTRAST-      HISTORY: ams      COMPARISON: 12/18/2024     TOTAL DOSE LENGTH PRODUCT: 736.6 mGy.cm. Automated exposure control was  also utilized to decrease patient radiation dose.     TECHNIQUE: Axial images of the brain are obtained without contrast     FINDINGS: Similar moderate generalized volume loss with stable appearing  bilateral  periventricular and white matter hypodensities. No  intracranial hemorrhage or mass effect. No convincing acute signs of  ischemia. No extra-axial hematoma or subarachnoid hemorrhage.     Chronic opacification right maxillary sinus. Mastoid air cells  well-aerated. Bony calvarium appears intact.       Impression:         1. No acute intracranial abnormality identified. Similar moderate  generalized volume loss with  chronic small vessel ischemic change.  Chronic opacified right maxillary sinus.     This report was signed and finalized on 6/7/2025 11:21 AM by Dr. Blanka Pak MD.                I have reviewed the patient's current medications.     [Held by provider] amLODIPine, 5 mg, Oral, Q24H  atorvastatin, 40 mg, Oral, Daily  [Held by provider] carvedilol, 3.125 mg, Oral, BID With Meals  cefTRIAXone, 1,000 mg, Intravenous, Q24H  donepezil, 10 mg, Oral, Nightly  gabapentin, 300 mg, Oral, Q12H  levothyroxine, 50 mcg, Oral, QAM  pantoprazole, 40 mg, Oral, Q AM  sertraline, 25 mg, Oral, Daily  sodium chloride, 10 mL, Intravenous, Q12H       Assessment/Plan   Assessment  Active Hospital Problems    Diagnosis     **Altered mental status     TOMÁS (acute kidney injury)     Acute UTI (urinary tract infection)     Essential hypertension     Anemia        Treatment Plan  1.  Altered mental status-improvement noted.  Continue to monitor.  Appears to have resolved.    2.  Acute kidney injury, nephrology recommendations reviewed.  IV infiltrated; primary RN notified and fluids are stopped.  When new IV is initiated plan to continue IVFs containing sodium bicarb.  Creatinine on admission 2.9 yesterday and today 6/9 creatinine 2.75.  Results of renal ultrasound are pending.  Vascular access team has been consulted for new peripheral IV.    3.  Urinary tract infection, continue ceftriaxone on day 3, cultures revealed E. Coli which is susceptible to ceftriaxone    4. Anemia of chronic disease- Hgb trend from admission hgb 7.8  and now 7.0.  Patient has received multiple boluses yesterday.  Will recheck hgb tomorrow am.  Results of iron profile reviewed.  Plan to give IV ferric gluconate.  Previous labs reviewed and chronic anemia pattern noted.    5.  Essential hypertension-now with hypotension, blood pressure medications on hold    Medical Decision Making    3 problems, acute, moderate complexity, unchanged  2 Problems, chronic, moderate complexity, unchanged    Number and Complexity of problems: 5  Differential Diagnosis: None    Conditions and Status        Condition is unchanged.     MDM Data  External documents reviewed: None  Cardiac tracing (EKG, telemetry) interpretation: Reviewed  Radiology interpretation: Reviewed  Labs reviewed: Yes  Any tests that were considered but not ordered: None     Decision rules/scores evaluated (example [id number], Wells, etc): None     Discussed with: Patient     Care Planning  Shared decision making: Patient  Code status and discussions: No CPR  Surrogate Decision Maker spouse, Adams Gomes    Disposition  Social Determinants of Health that impact treatment or disposition: None  I expect the patient to be discharged to SNF ([facility]) in hopefully 2-3 days.     Electronically signed by MORGAN Brown Student-BC, 06/09/25, 15:10 CDT.

## 2025-06-09 NOTE — CASE MANAGEMENT/SOCIAL WORK
Continued Stay Note   Carlotta     Patient Name: Jennifer Gomes  MRN: [id number]  Today's Date: 6/9/2025    Admit Date: 6/7/2025    Plan: Referral to [facility]   Discharge Plan       Row Name 06/09/25 1159       Plan    Plan Comments Referral will be sent to [facility] once therapy evaluations are completed.                   Discharge Codes    No documentation.                       MICHAEL Burt

## 2025-06-09 NOTE — PROGRESS NOTES
"Nephrology ([facility]) Progress Note      Patient:  Jennifer Gomes  YOB: 1940  Date of Service: 6/9/2025  MRN: [id number]   Acct: [id number]   Primary Care Physician: Katy Leone DO  Advance Directive:   Code Status and Medical Interventions: No CPR (Do Not Attempt to Resuscitate); Limited Support; No intubation (DNI)   Ordered at: 06/07/25 1414     Code Status (Patient has no pulse and is not breathing):    No CPR (Do Not Attempt to Resuscitate)     Medical Interventions (Patient has pulse or is breathing):    Limited Support     Medical Intervention Limits:    No intubation (DNI)     Level Of Support Discussed With:    Health Care Surrogate     Admit Date: 6/7/2025       Hospital Day: 2  Referring Provider: No Known Provider      Patient personally seen and examined.  Complete chart including Consults, Notes, Operative Reports, Labs, Cardiology, and Radiology studies reviewed as able.        Subjective:  Jennifer Gomes is a 84 y.o. female for whom we were consulted for evaluation and treatment of acute kidney injury with a history of chronic kidney disease of unknown stage.  No prior nephrologic evaluations.  She also has a history of hypertension, GERD, fibromyalgia, depression, sleep apnea and dementia.  She presented to the emergency room with worsening mental status and was diagnosed with cystitis and acute kidney injury.  She was given IV antibiotics and fluids for treatment.    Today, no overnight events.  She denied current chest pain, shortness of air at rest, nausea or vomiting.  Urine output and renal function both notedly improved a bit overnight.  She had reported a several year history of \"jerking\" movements and was asking for further evaluation about that.    Allergies:  Ropinirole hcl, Codeine, Definity [perflutren lipid microsphere], Ambien [zolpidem], Eszopiclone, Pregabalin, and Tizanidine    Home Meds:  Medications Prior to Admission   Medication " Sig Dispense Refill Last Dose/Taking    amLODIPine (NORVASC) 5 MG tablet Take 1 tablet by mouth Daily.   Taking    atorvastatin (LIPITOR) 40 MG tablet Take 1 tablet by mouth Daily.   Taking    butalbital-acetaminophen-caffeine (FIORICET, ESGIC) -40 MG per tablet Take 1 tablet by mouth Every 12 (Twelve) Hours.   Taking    carvedilol (COREG) 3.125 MG tablet Take 1 tablet by mouth 2 (Two) Times a Day With Meals.   Taking    cyclobenzaprine (FLEXERIL) 10 MG tablet Take 1 tablet by mouth Every 12 (Twelve) Hours.   Taking    donepezil (ARICEPT) 10 MG tablet Take 1 tablet by mouth Every Night.   Taking    ergocalciferol (ERGOCALCIFEROL) 03171 units capsule Take 1 capsule by mouth 1 (One) Time Per Week. Saturday   Taking    gabapentin (NEURONTIN) 300 MG capsule Take 1 capsule by mouth 2 (Two) Times a Day.   Taking    lansoprazole (PREVACID) 30 MG capsule Take 1 capsule by mouth Daily.   Taking    levothyroxine (SYNTHROID, LEVOTHROID) 50 MCG tablet Take 1 tablet by mouth Daily.   Taking    oxyCODONE (ROXICODONE) 15 MG immediate release tablet Take 1 tablet by mouth 3 times a day.   Taking    sertraline (ZOLOFT) 25 MG tablet Take 1 tablet by mouth Daily. 90 tablet 0 Taking    acetaminophen (TYLENOL) 325 MG tablet Take 2 tablets by mouth Every 6 (Six) Hours As Needed for Mild Pain.          Medicines:  Current Facility-Administered Medications   Medication Dose Route Frequency Provider Last Rate Last Admin    acetaminophen (TYLENOL) tablet 650 mg  650 mg Oral Q4H PRN Holli Menendez APRN        Or    acetaminophen (TYLENOL) 160 MG/5ML oral solution 650 mg  650 mg Oral Q4H PRN Holli Menendez APRN        Or    acetaminophen (TYLENOL) suppository 650 mg  650 mg Rectal Q4H PRN Holli Menendez APRN        [Held by provider] amLODIPine (NORVASC) tablet 5 mg  5 mg Oral Q24H Holli Menendez APRN        atorvastatin (LIPITOR) tablet 40 mg  40 mg Oral Daily Holli Menendez APRN   40 mg at 06/09/25 0882    sennosides-docusate  (PERICOLACE) 8.6-50 MG per tablet 2 tablet  2 tablet Oral BID PRN Holli Menendez APRN        And    polyethylene glycol (MIRALAX) packet 17 g  17 g Oral Daily PRN Holli Menendez APRN        And    bisacodyl (DULCOLAX) EC tablet 5 mg  5 mg Oral Daily PRN Holli Menendez APRN        And    bisacodyl (DULCOLAX) suppository 10 mg  10 mg Rectal Daily PRN Holli Menendez APRN        Calcium Replacement - Follow Nurse / BPA Driven Protocol   Not Applicable PRN Holli Menendez APRN        [Held by provider] carvedilol (COREG) tablet 3.125 mg  3.125 mg Oral BID With Meals Holli Menendez APRN        cefTRIAXone (ROCEPHIN) 1,000 mg in sodium chloride 0.9 % 100 mL MBP  1,000 mg Intravenous Q24H Holli Menendez APRN 200 mL/hr at 06/08/25 1545 1,000 mg at 06/08/25 1545    donepezil (ARICEPT) tablet 10 mg  10 mg Oral Nightly Holli Menendez APRN   10 mg at 06/08/25 2031    gabapentin (NEURONTIN) capsule 300 mg  300 mg Oral Q12H Holli Menendez APRN   300 mg at 06/09/25 0851    levothyroxine (SYNTHROID, LEVOTHROID) tablet 50 mcg  50 mcg Oral QAM Holli Menendez APRN   50 mcg at 06/09/25 0851    Magnesium Standard Dose Replacement - Follow Nurse / BPA Driven Protocol   Not Applicable PRN Holli Menendez APRN        nitroglycerin (NITROSTAT) SL tablet 0.4 mg  0.4 mg Sublingual Q5 Min PRN Holli Menendez APRN        ondansetron ODT (ZOFRAN-ODT) disintegrating tablet 4 mg  4 mg Oral Q6H PRN Holli Menendez APRN        Or    ondansetron (ZOFRAN) injection 4 mg  4 mg Intravenous Q6H PRN Holli Menendez APRN        oxyCODONE (ROXICODONE) immediate release tablet 15 mg  15 mg Oral TID PRN Holli Menendez APRN   15 mg at 06/09/25 0853    pantoprazole (PROTONIX) EC tablet 40 mg  40 mg Oral Q AM Holli Menendez APRN   40 mg at 06/09/25 0851    Phosphorus Replacement - Follow Nurse / BPA Driven Protocol   Not Applicable PRN Holli Menendez APRN        Potassium Replacement - Follow Nurse / BPA Driven Protocol   Not  Applicable PRN MenendezHolli tipton L, APRN        sertraline (ZOLOFT) tablet 25 mg  25 mg Oral Daily MenendezHolli L, APRN   25 mg at 25 0851    sodium chloride 0.45 % 925 mL with sodium bicarbonate 8.4 % 75 mEq infusion   Intravenous Continuous [employer]  mL/hr at 25 0345 New Bag at 25 0345    sodium chloride 0.9 % flush 10 mL  10 mL Intravenous Q12H Holli Menendez L, APRN   10 mL at 25 0856    sodium chloride 0.9 % flush 10 mL  10 mL Intravenous PRN MenendezHolli L, APRN        sodium chloride 0.9 % infusion 40 mL  40 mL Intravenous PRN Holli Menenedz L, APRN        sodium chloride 0.9 % infusion  75 mL/hr Intravenous Continuous Holli Menendez, APRN 75 mL/hr at 25 0044 75 mL/hr at 25 0044       Past Medical History:  Past Medical History:   Diagnosis Date    Anxiety     Arthritis     Bronchitis     Cancer     uterine    Chronic pain     Depression     Disease of thyroid gland     Fibromyalgia     GERD (gastroesophageal reflux disease)     Headache     History of transfusion     AS     Hyperlipidemia     Hypertension     Incontinence     Insomnia     Leg pain     Lumbar stenosis     Migraines     Peptic ulcer     Restless legs     Sleep apnea     NO C-PAP    UTI (urinary tract infection)     Vaginal bleeding        Past Surgical History:  Past Surgical History:   Procedure Laterality Date    BLADDER REPAIR      MESH HAD TO BE REMOVED IN 2013    BREAST BIOPSY Right 2017    benign    BREAST CYST EXCISION Left     CARDIAC CATHETERIZATION      CARPAL TUNNEL RELEASE      CATARACT EXTRACTION W/ INTRAOCULAR LENS  IMPLANT, BILATERAL      CHOLECYSTECTOMY WITH INTRAOPERATIVE CHOLANGIOGRAM N/A 2023    Procedure: CHOLECYSTECTOMY LAPAROSCOPIC INTRAOPERATIVE CHOLANGIOGRAM;  Surgeon: Gerardo Arciniega MD;  Location: [facility];  Service: General;  Laterality: N/A;    COLONOSCOPY      COLONOSCOPY N/A 10/01/2021    Procedure: COLONOSCOPY WITH ANESTHESIA;  Surgeon: Rod  Tom JENSEN DO;  Location: [facility];  Service: Gastroenterology;  Laterality: N/A;  pre: change in bowel habits  post: diverticulosis. hemorrhoids.   Olivia Mora APRN        CYSTECTOMY      D & C HYSTEROSCOPY N/A 11/06/2017    Procedure: DILATATION AND CURETTAGE HYSTEROSCOPY;  Surgeon: Shasta Madrigal MD;  Location: [facility] OR;  Service:     DILATION AND CURETTAGE, DIAGNOSTIC / THERAPEUTIC  2008    ENDOSCOPY  09/23/2010    Short segment of Arriola's,Moderate chroninc esophagogastritis and negative H.pylori    ENDOSCOPY N/A 09/25/2017    Procedure: ESOPHAGOGASTRODUODENOSCOPY WITH ANESTHESIA;  Surgeon: Tom Velasco DO;  Location: [facility];  Service:     EYE SURGERY      RETINA    HEMORRHOIDECTOMY SIGMOIDOSCOPY N/A 3/21/2023    Procedure: HEMORRHOIDECTOMY WITH EXAM UNDER ANESTHESIA;  Surgeon: Holly Chavez MD;  Location: [facility] OR;  Service: General;  Laterality: N/A;    HYSTERECTOMY  12/20/2017    ORIF TIBIA/FIBULA FRACTURES Left 2000    TRANSVAGINAL TAPING SUSPENSION N/A 11/06/2017    Procedure: VAGINAL MESH REVISION;  Surgeon: Shasta Madrigal MD;  Location: [facility] OR;  Service:     VAGINAL MESH REVISION  2013       Family History  Family History   Problem Relation Age of Onset    Diabetes Mother     Multiple myeloma Mother     Stroke Father     Diabetes Sister     Prostate cancer Brother     Lymphoma Brother         NHL    Ovarian cancer Paternal Aunt     Cancer Paternal Grandmother         metastatic    Lung cancer Paternal Grandfather     Colon cancer Neg Hx     Esophageal cancer Neg Hx     Breast cancer Neg Hx        Social History  Social History     Socioeconomic History    Marital status:    Tobacco Use    Smoking status: Never    Smokeless tobacco: Never   Vaping Use    Vaping status: Never Used   Substance and Sexual Activity    Alcohol use: Not Currently     Comment: occasional    Drug use: No    Sexual activity: Defer       Review of Systems:  History obtained from  chart review and the patient  General ROS: No fever or chills  Respiratory ROS: No cough, shortness of breath, wheezing  Cardiovascular ROS: No chest pain or palpitations  Gastrointestinal ROS: No abdominal pain or melena  Genito-Urinary ROS: No dysuria or hematuria  Psych ROS: No anxiety and depression  14 point ROS reviewed with the patient and negative except as noted above and in the HPI unless unable to obtain.    Objective:  Patient Vitals for the past 24 hrs:   BP Temp Temp src Pulse Resp SpO2   06/09/25 1119 138/58 98.4 °F (36.9 °C) Oral 98 14 99 %   06/09/25 0933 139/54 98.2 °F (36.8 °C) Oral 107 12 92 %   06/09/25 0243 (!) 111/36 98.7 °F (37.1 °C) Oral 87 16 100 %   06/08/25 1951 (!) 116/32 98.5 °F (36.9 °C) Oral 88 14 100 %   06/08/25 1527 (!) 107/28 -- Oral 78 14 99 %       Intake/Output Summary (Last 24 hours) at 6/9/2025 1434  Last data filed at 6/9/2025 1050  Gross per 24 hour   Intake 600 ml   Output 1300 ml   Net -700 ml     General: awake/alert   Chest:  clear to auscultation bilaterally without respiratory distress  CVS: regular rate and rhythm  Abdominal: soft, nontender, positive bowel sounds  Extremities: no cyanosis or edema  Skin: warm and dry without rash      Labs:  Results from last 7 days   Lab Units 06/09/25  0501 06/08/25  0342 06/07/25  1255   WBC 10*3/mm3 5.86 7.89 7.13   HEMOGLOBIN g/dL 7.0* 7.5* 7.8*   HEMATOCRIT % 23.5* 24.1* 24.6*   PLATELETS 10*3/mm3 165 185 208         Results from last 7 days   Lab Units 06/09/25  0501 06/08/25  0342 06/07/25  1255   SODIUM mmol/L 139 138 138   POTASSIUM mmol/L 4.5 4.7 4.6   CHLORIDE mmol/L 114* 110* 106   CO2 mmol/L 19.0* 18.0* 21.0*   BUN mg/dL 47.3* 47.3* 48.7*   CREATININE mg/dL 2.75* 2.90* 2.77*   CALCIUM mg/dL 8.5* 8.5* 9.3   EGFR mL/min/1.73 16.7* 15.7* 16.6*   BILIRUBIN mg/dL <0.2  --  <0.2   ALK PHOS U/L 48  --  60   ALT (SGPT) U/L <5  --  6   AST (SGOT) U/L 6  --  12   GLUCOSE mg/dL 100* 117* 103*       Radiology:   Imaging Results  (Last 72 Hours)       Procedure Component Value Units Date/Time    US Renal Bilateral - In process [[id number]] Resulted: 06/09/25 1348     Updated: 06/09/25 1413    This result has not been signed. Information might be incomplete.      XR Chest 1 View [[id number]] Collected: 06/07/25 1205     Updated: 06/07/25 1210    Narrative:      XR CHEST 1 VW-     HISTORY: fall     COMPARISON: 12/18/2024     FINDINGS: Frontal view of the chest obtained.     Low lung volumes. Mild elevation right hemidiaphragm. Right basilar  densities favoring atelectasis. Cardiomediastinal contour is normal.  Calcified left hilar lymph nodes. No pleural effusion or pneumothorax.  No acute regional bony pathology. Right upper quadrant surgical clips.       Impression:      1. Low lung volumes with right basilar atelectasis and mild elevation  right hemidiaphragm. No pleural effusion or pneumothorax.        This report was signed and finalized on 6/7/2025 12:07 PM by Dr. Blanka Pak MD.       CT Head Without Contrast [[id number]] Collected: 06/07/25 1119     Updated: 06/07/25 1124    Narrative:      CT HEAD WO CONTRAST-      HISTORY: ams      COMPARISON: 12/18/2024     TOTAL DOSE LENGTH PRODUCT: 736.6 mGy.cm. Automated exposure control was  also utilized to decrease patient radiation dose.     TECHNIQUE: Axial images of the brain are obtained without contrast     FINDINGS: Similar moderate generalized volume loss with stable appearing  bilateral periventricular and white matter hypodensities. No  intracranial hemorrhage or mass effect. No convincing acute signs of  ischemia. No extra-axial hematoma or subarachnoid hemorrhage.     Chronic opacification right maxillary sinus. Mastoid air cells  well-aerated. Bony calvarium appears intact.       Impression:         1. No acute intracranial abnormality identified. Similar moderate  generalized volume loss with  chronic small vessel ischemic change.  Chronic opacified right maxillary sinus.      This report was signed and finalized on 6/7/2025 11:21 AM by Dr. Blanka Pak MD.               Culture:  Blood Culture   Date Value Ref Range Status   06/08/2025 No growth at 24 hours  Preliminary     Urine Culture   Date Value Ref Range Status   06/07/2025 >100,000 CFU/mL Escherichia coli (A)  Final         Assessment   Acute kidney injury  Acute cystitis  Metabolic acidosis  Anemia with iron deficiency  Dementia  Chronic kidney disease-unknown stage  Hypertension with recent hypotension    Plan:  Discussed with patient, nursing  Workup reviewed today  Monitor labs  Fluids adjustments as per orders  Defer further neurologic workup to primary service  Transfuse packed red blood cells as needed for hemoglobin less than 7  Ultrasound report pending at time of dictation    Basil Bermudez MD  6/9/2025  14:34 CDT

## 2025-06-09 NOTE — PLAN OF CARE
"Goal Outcome Evaluation:  Plan of Care Reviewed With: patient           Outcome Evaluation: PT eval complete. Pt in fowlers position, AOx2 and reports she is \"bedbound\" at her baseline at home. With further questions pt reports she verbalized t/f to wheelchair for any community outings with assist from daughter. Pt was able to perform bed mobility with CGA, but found to be soiled of stool while in bed. no formal testing as pt reports she is \"bedbound\" but does t/f to wheelchair at baseline. Remained WFL to t/f. pt performed with CGA/Min A to return tobed and performed stand pivot t/f. Pt returned to bed and left in fowlers position. Pt will benefit from skilled PT services to improve t/f to indep, decrease fall risk and continued safety awareness. Due to Dep A with cleaning and limited acitivity tolerance, PT recommends SNF. PT will continue to follow.    Anticipated Discharge Disposition (PT): skilled nursing facility                        "

## 2025-06-09 NOTE — CASE MANAGEMENT/SOCIAL WORK
Continued Stay Note   [place]     Patient Name: Jennifer Gomes  MRN: [id number]  Today's Date: 6/9/2025    Admit Date: 6/7/2025    Plan: Referral to [facility]   Discharge Plan       Row Name 06/09/25 1610       Plan    Plan Comments Referral sent to [facility]. If bed offered, precert will be started.                   Discharge Codes    No documentation.                       MICHAEL Burt

## 2025-06-09 NOTE — PLAN OF CARE
Goal Outcome Evaluation:  Plan of Care Reviewed With: patient        Progress: no change  Outcome Evaluation: Patient A/O x 1-2. VSS. NSR on telemetry. Tolerating IV fluids. NPO since midnight for BL renal ultrasound this morning. Incontinent of bowel and bladder. No PRNs given. Safety maintained.

## 2025-06-09 NOTE — THERAPY EVALUATION
Patient Name: Jennifer Gomes  : 1940    MRN: [id number]                              Today's Date: 2025       Admit Date: 2025    Visit Dx:     ICD-10-CM ICD-9-CM   1. Failure to thrive in adult  R62.7 783.7   2. Altered mental status, unspecified altered mental status type  R41.82 780.97   3. Acute renal failure superimposed on chronic kidney disease, unspecified acute renal failure type, unspecified CKD stage  N17.9 584.9    N18.9 585.9   4. Acute UTI (urinary tract infection)  N39.0 599.0   5. Anemia, unspecified type  D64.9 285.9   6. Discharge planning issues  Z75.8 V49.89   7. Impaired mobility [Z74.09]  Z74.09 799.89     Patient Active Problem List   Diagnosis    Gastroesophageal reflux disease    Spinal stenosis, lumbar region, without neurogenic claudication    Erosion of vaginal mesh    Adenocarcinoma of endometrium    S/P hysterectomy with oophorectomy    Encounter for follow-up surveillance of endometrial cancer    History of radiation therapy    Non-traumatic rhabdomyolysis    Metabolic encephalopathy    Acute renal failure superimposed on stage 3 chronic kidney disease    Medical non-compliance, does not take narcotics as prescribed    Chronic constipation    Chronic pain syndrome    Chronic prescription opiate use    Anemia    Hypothyroidism (acquired)    Essential hypertension    Venous insufficiency of both lower extremities    Chronic intractable headache    RAFAL (obstructive sleep apnea)    Acute encephalopathy due to UTI    Toxic metabolic encephalopathy    Polypharmacy    Frequent falls    Grade III internal hemorrhoids    External hemorrhoids    Colitis    Moderate malnutrition    Transaminitis    Acute UTI (urinary tract infection)    Recurrent UTI (urinary tract infection)    Dementia    Hypokalemia    Sepsis due to urinary tract infection    Cardiopulmonary arrest    E coli bacteremia    Anemia requiring transfusions    Bilateral hydronephrosis    Acute urinary retention     TOMÁS (acute kidney injury)    Hypercalcemia    Paroxysmal atrial fibrillation    Altered mental status     Past Medical History:   Diagnosis Date    Anxiety     Arthritis     Bronchitis     Cancer     uterine    Chronic pain     Depression     Disease of thyroid gland     Fibromyalgia     GERD (gastroesophageal reflux disease)     Headache     History of transfusion     AS     Hyperlipidemia     Hypertension     Incontinence     Insomnia     Leg pain     Lumbar stenosis     Migraines     Peptic ulcer     Restless legs     Sleep apnea     NO C-PAP    UTI (urinary tract infection)     Vaginal bleeding      Past Surgical History:   Procedure Laterality Date    BLADDER REPAIR      MESH HAD TO BE REMOVED IN 2013    BREAST BIOPSY Right 2017    benign    BREAST CYST EXCISION Left 1982    CARDIAC CATHETERIZATION      CARPAL TUNNEL RELEASE      CATARACT EXTRACTION W/ INTRAOCULAR LENS  IMPLANT, BILATERAL      CHOLECYSTECTOMY WITH INTRAOPERATIVE CHOLANGIOGRAM N/A 2023    Procedure: CHOLECYSTECTOMY LAPAROSCOPIC INTRAOPERATIVE CHOLANGIOGRAM;  Surgeon: Gerardo Arciniega MD;  Location: [facility] OR;  Service: General;  Laterality: N/A;    COLONOSCOPY      COLONOSCOPY N/A 10/01/2021    Procedure: COLONOSCOPY WITH ANESTHESIA;  Surgeon: Tom Velasco DO;  Location: [facility] ENDOSCOPY;  Service: Gastroenterology;  Laterality: N/A;  pre: change in bowel habits  post: diverticulosis. hemorrhoids.   Olivia Mora APRN        CYSTECTOMY      D & C HYSTEROSCOPY N/A 2017    Procedure: DILATATION AND CURETTAGE HYSTEROSCOPY;  Surgeon: Shasta Madrigal MD;  Location: [facility] OR;  Service:     DILATION AND CURETTAGE, DIAGNOSTIC / THERAPEUTIC      ENDOSCOPY  2010    Short segment of Arriola's,Moderate chroninc esophagogastritis and negative H.pylori    ENDOSCOPY N/A 2017    Procedure: ESOPHAGOGASTRODUODENOSCOPY WITH ANESTHESIA;  Surgeon: Tom Velasco DO;  Location: [facility] ENDOSCOPY;  Service:     EYE  "SURGERY      RETINA    HEMORRHOIDECTOMY SIGMOIDOSCOPY N/A 3/21/2023    Procedure: HEMORRHOIDECTOMY WITH EXAM UNDER ANESTHESIA;  Surgeon: Holly Chavez MD;  Location:  [facility] OR;  Service: General;  Laterality: N/A;    HYSTERECTOMY  12/20/2017    ORIF TIBIA/FIBULA FRACTURES Left 2000    TRANSVAGINAL TAPING SUSPENSION N/A 11/06/2017    Procedure: VAGINAL MESH REVISION;  Surgeon: Shasta Madrigal MD;  Location:  [facility] OR;  Service:     VAGINAL MESH REVISION  2013      General Information       Row Name 06/09/25 1300          Physical Therapy Time and Intention    Document Type evaluation  pt presents after unwitnessed fall at home Dx; AMS with UTI  -     Mode of Treatment physical therapy  -       Row Name 06/09/25 1300          General Information    Patient Profile Reviewed yes  -AJ     Prior Level of Function independent:;transfer;w/c or scooter;ADL's;max assist:;dependent:;all household mobility;home management;community mobility  pt reports she is usually able to t/f to a wheelchair but is \"bedbound\" at baseline  -     Existing Precautions/Restrictions fall  -     Barriers to Rehab medically complex;previous functional deficit;physical barrier  -       Row Name 06/09/25 1300          Living Environment    Current Living Arrangements home  -     People in Home child(sebastian), adult;spouse  -       Row Name 06/09/25 1300          Home Main Entrance    Number of Stairs, Main Entrance none  -       Row Name 06/09/25 1300          Stairs Within Home, Primary    Number of Stairs, Within Home, Primary none  -       Row Name 06/09/25 1300          Cognition    Orientation Status (Cognition) oriented to;person;place;disoriented to;situation;time;verbal cues/prompts needed for orientation  -       Row Name 06/09/25 1300          Safety Issues/Impairments Affecting Functional Mobility    Safety Issues Affecting Function (Mobility) safety precaution awareness;safety precautions " "follow-through/compliance;awareness of need for assistance;insight into deficits/self-awareness  -     Impairments Affecting Function (Mobility) balance;endurance/activity tolerance;strength  -               User Key  (r) = Recorded By, (t) = Taken By, (c) = Cosigned By      Initials Name Provider Type    Sunny Tsang PT DPT Physical Therapist                   Mobility       Row Name 06/09/25 1300          Bed Mobility    Bed Mobility rolling right;supine-sit;sit-supine  -AJ     Rolling Right [place] (Bed Mobility) contact guard  -AJ     Supine-Sit [place] (Bed Mobility) contact guard  -AJ     Sit-Supine [place] (Bed Mobility) contact guard  -     Assistive Device (Bed Mobility) head of bed elevated;bed rails  -       Row Name 06/09/25 1300          Bed-Chair Transfer    Bed-Chair [place] (Transfers) contact guard;minimum assist (75% patient effort)  -KAVITA     Assistive Device (Bed-Chair Transfers) other (see comments)  HHA  -     Comment, (Bed-Chair Transfer) HHA for stand pivot t/f, Min A on 2nd t/f for safety  -       Row Name 06/09/25 1300          Gait/Stairs (Locomotion)    Patient was able to Ambulate no, other medical factors prevent ambulation  -     Reason Patient was unable to Ambulate Non-Ambulatory at Baseline  -               User Key  (r) = Recorded By, (t) = Taken By, (c) = Cosigned By      Initials Name Provider Type    Sunny Tsang PT DPT Physical Therapist                   Obj/Interventions       Row Name 06/09/25 1300          Range of Motion Comprehensive    General Range of Motion no range of motion deficits identified  -     Comment, General Range of Motion no formal testin as pt reports she is \"bedbound\" but does t/f to wheelchair at baseline. Remained WFL to t/f  -       Row Name 06/09/25 1300          Strength Comprehensive (MMT)    General Manual Muscle Testing (MMT) Assessment no strength deficits identified  -     Comment, General " "Manual Muscle Testing (MMT) Assessment no formal testing as pt reports she is \"bedbound\" but does t/f to wheelchair at baseline. Remained WFL to t/f  -       Row Name 06/09/25 1300          Balance    Balance Assessment sitting static balance;sitting dynamic balance;standing static balance;standing dynamic balance  -AJ               User Key  (r) = Recorded By, (t) = Taken By, (c) = Cosigned By      Initials Name Provider Type    Sunny Tsang, PT DPT Physical Therapist                   Goals/Plan       Row Name 06/09/25 1300          Bed Mobility Goal 1 (PT)    Activity/Assistive Device (Bed Mobility Goal 1, PT) bed mobility activities, all  -AJ     [place] Level/Cues Needed (Bed Mobility Goal 1, PT) independent  -AJ     Time Frame (Bed Mobility Goal 1, PT) long term goal (LTG);10 days  -AJ     Progress/Outcomes (Bed Mobility Goal 1, PT) new goal  -       Row Name 06/09/25 1300          Transfer Goal 1 (PT)    Activity/Assistive Device (Transfer Goal 1, PT) sit-to-stand/stand-to-sit;wheelchair transfer;toilet;bed-to-chair/chair-to-bed  -AJ     [place] Level/Cues Needed (Transfer Goal 1, PT) standby assist  -AJ     Time Frame (Transfer Goal 1, PT) long term goal (LTG);10 days  -AJ     Progress/Outcome (Transfer Goal 1, PT) new goal  -       Row Name 06/09/25 1300          Balance Goal 1 (PT)    Activity/Assistive Device (Balance Goal) sit to stand dynamic balance;standing static balance;standing dynamic balance;sitting static balance;sitting dynamic balance;with functional activities/occupations;with functional mobility activities;with ADLs;supported  -AJ     [place] Level/Cues Needed (Balance Goal 1, PT) contact guard required  -AJ     Time Frame (Balance Goal 1, PT) long-term goal (LTG);by discharge  -AJ     Progress/Outcomes (Balance Goal 1, PT) other (see comments)  new goal  -       Row Name 06/09/25 1300          Therapy Assessment/Plan (PT)    Planned Therapy Interventions (PT) " "balance training;bed mobility training;gait training;home exercise program;patient/family education;transfer training;postural re-education;strengthening;ROM (range of motion);wheelchair management/propulsion training  -               User Key  (r) = Recorded By, (t) = Taken By, (c) = Cosigned By      Initials Name Provider Type    Sunny Tsang, PT DPT Physical Therapist                   Clinical Impression       Row Name 06/09/25 1300          Pain    Pretreatment Pain Rating 0/10 - no pain  -AJ     Posttreatment Pain Rating 0/10 - no pain  -     Pain Side/Orientation generalized  -     Pain Management Interventions nursing notified  -     Response to Pain Interventions no change per patient report  -       Row Name 06/09/25 1300          Plan of Care Review    Plan of Care Reviewed With patient  -     Outcome Evaluation PT eval complete. Pt in fowlers position, AOx2 and reports she is \"bedbound\" at her baseline at home. With further questions pt reports she verbalized t/f to wheelchair for any community outings with assist from daughter. Pt was able to perform bed mobility with CGA, but found to be soiled of stool while in bed. no formal testing as pt reports she is \"bedbound\" but does t/f to wheelchair at baseline. Remained WFL to t/f. pt performed with CGA/Min A to return tobed and performed stand pivot t/f. Pt returned to bed and left in fowlers position. Pt will benefit from skilled PT services to improve t/f to indep, decrease fall risk and continued safety awareness. Due to Dep A with cleaning and limited acitivity tolerance, PT recommends SNF. PT will continue to follow.  -       Row Name 06/09/25 1300          Therapy Assessment/Plan (PT)    Patient/Family Therapy Goals Statement (PT) none stated  -     Rehab Potential (PT) fair  -     Criteria for Skilled Interventions Met (PT) yes;meets criteria;skilled treatment is necessary  -     Therapy Frequency (PT) 2 times/day  -AJ     " Predicted Duration of Therapy Intervention (PT) until d/c  -KAVITA       Row Name 06/09/25 1300          Positioning and Restraints    Pre-Treatment Position in bed  -AJ     Post Treatment Position bed  -AJ     In Bed notified nsg;fowlers;call light within reach;encouraged to call for assist;exit alarm on;side rails up x2  -AJ               User Key  (r) = Recorded By, (t) = Taken By, (c) = Cosigned By      Initials Name Provider Type    Sunny Tsang PT DPT Physical Therapist                   Outcome Measures       Row Name 06/09/25 1300 06/09/25 0853       How much help from another person do you currently need...    Turning from your back to your side while in flat bed without using bedrails? 4  -AJ 4  -CP    Moving from lying on back to sitting on the side of a flat bed without bedrails? 4  -AJ 4  -CP    Moving to and from a bed to a chair (including a wheelchair)? 3  -AJ 2  -CP    Standing up from a chair using your arms (e.g., wheelchair, bedside chair)? 3  -AJ 2  -CP    Climbing 3-5 steps with a railing? 1  -AJ 2  -CP    To walk in hospital room? 1  -AJ 2  -CP    AM-PAC 6 Clicks Score (PT) 16  -AJ 16  -CP    Highest Level of Mobility Goal Stand (1 or More Minutes)-5  -AJ Stand (1 or More Minutes)-5  -CP      Row Name 06/09/25 1300          Functional Assessment    Outcome Measure Options AM-PAC 6 Clicks Basic Mobility (PT)  -KAVITA               User Key  (r) = Recorded By, (t) = Taken By, (c) = Cosigned By      Initials Name Provider Type    Rubi Garrido, RN Registered Nurse    Sunny Tsang, PT DPT Physical Therapist                                 Physical Therapy Education       Title: PT OT SLP Therapies (In Progress)       Topic: Physical Therapy (Done)       Point: Mobility training (Done)       Learning Progress Summary            Patient Acceptance, E, VU,NR by KAVITA at 6/9/2025 9110    Comment: role of PT, high fall risk, t/f, dc planning                      Point: Home exercise program (Done)  "      Learning Progress Summary            Patient Acceptance, E, VU,NR by KAVITA at 6/9/2025 1414    Comment: role of PT, high fall risk, t/f, dc planning                      Point: Body mechanics (Done)       Learning Progress Summary            Patient Acceptance, E, VU,NR by KAVITA at 6/9/2025 1414    Comment: role of PT, high fall risk, t/f, dc planning                      Point: Precautions (Done)       Learning Progress Summary            Patient Acceptance, E, VU,NR by KAVITA at 6/9/2025 1414    Comment: role of PT, high fall risk, t/f, dc planning                                      User Key       Initials Effective Dates Name Provider Type Discipline     08/15/24 -  Sunny Esquivel, HUNTER DPT Physical Therapist PT                  PT Recommendation and Plan  Planned Therapy Interventions (PT): balance training, bed mobility training, gait training, home exercise program, patient/family education, transfer training, postural re-education, strengthening, ROM (range of motion), wheelchair management/propulsion training  Outcome Evaluation: PT eval complete. Pt in fowlers position, AOx2 and reports she is \"bedbound\" at her baseline at home. With further questions pt reports she verbalized t/f to wheelchair for any community outings with assist from daughter. Pt was able to perform bed mobility with CGA, but found to be soiled of stool while in bed. no formal testing as pt reports she is \"bedbound\" but does t/f to wheelchair at baseline. Remained WFL to t/f. pt performed with CGA/Min A to return tobed and performed stand pivot t/f. Pt returned to bed and left in fowlers position. Pt will benefit from skilled PT services to improve t/f to indep, decrease fall risk and continued safety awareness. Due to Dep A with cleaning and limited acitivity tolerance, PT recommends SNF. PT will continue to follow.     Time Calculation:         PT Charges       Row Name 06/09/25 1300             Time Calculation    Start Time 1300  -KAVITA   "    Stop Time 1338  +8 for charr review  -AJ      Time Calculation (min) 38 min  -AJ      PT Received On 06/09/25  -AJ      PT Goal Re-Cert Due Date 06/19/25  -AJ         Untimed Charges    PT Eval/Re-eval Minutes 46  -AJ         Total Minutes    Untimed Charges Total Minutes 46  -AJ       Total Minutes 46  -AJ                User Key  (r) = Recorded By, (t) = Taken By, (c) = Cosigned By      Initials Name Provider Type    AJ Sunny Esquivel, PT DPT Physical Therapist                  Therapy Charges for Today       Code Description Service Date Service Provider Modifiers Qty    79123484857 HC PT EVAL MOD COMPLEXITY 3 6/9/2025 Sunny Esquivel, PT DPT GP 1            PT G-Codes  Outcome Measure Options: AM-PAC 6 Clicks Basic Mobility (PT)  AM-PAC 6 Clicks Score (PT): 16  PT Discharge Summary  Anticipated Discharge Disposition (PT): skilled nursing facility    Sunny Esquivel PT DPT  6/9/2025

## 2025-06-09 NOTE — PLAN OF CARE
Goal Outcome Evaluation:  Plan of Care Reviewed With: patient        Progress: no change  Outcome Evaluation: Pt disoriented. Aggitated at start of shift and refusing to answer orientation questions. More alert and tearful/suspicious of staff this evening. IV fluids and abx cont per orders. IV iron to be given. C/o of chronic BLE pain w/ PRN pain meds given. [place]. Bed alarm on. Call light in reach. Safety maintained.

## 2025-06-10 ENCOUNTER — APPOINTMENT (OUTPATIENT)
Dept: CT IMAGING | Facility: HOSPITAL | Age: 85
DRG: 690 | End: 2025-06-10
Payer: MEDICARE

## 2025-06-10 LAB
25(OH)D3 SERPL-MCNC: 38 NG/ML (ref 30–100)
ANION GAP SERPL CALCULATED.3IONS-SCNC: 11 MMOL/L (ref 5–15)
BASOPHILS # BLD AUTO: 0.01 10*3/MM3 (ref 0–0.2)
BASOPHILS NFR BLD AUTO: 0.1 % (ref 0–1.5)
BUN SERPL-MCNC: 41.3 MG/DL (ref 8–23)
BUN/CREAT SERPL: 14.9 (ref 7–25)
CALCIUM SPEC-SCNC: 8.7 MG/DL (ref 8.6–10.5)
CHLORIDE SERPL-SCNC: 109 MMOL/L (ref 98–107)
CO2 SERPL-SCNC: 19 MMOL/L (ref 22–29)
CREAT SERPL-MCNC: 2.77 MG/DL (ref 0.57–1)
DEPRECATED RDW RBC AUTO: 46.2 FL (ref 37–54)
EGFRCR SERPLBLD CKD-EPI 2021: 16.6 ML/MIN/1.73
EOSINOPHIL # BLD AUTO: 0.17 10*3/MM3 (ref 0–0.4)
EOSINOPHIL NFR BLD AUTO: 2.1 % (ref 0.3–6.2)
ERYTHROCYTE [DISTWIDTH] IN BLOOD BY AUTOMATED COUNT: 13.5 % (ref 12.3–15.4)
GLUCOSE SERPL-MCNC: 116 MG/DL (ref 65–99)
HCT VFR BLD AUTO: 24.9 % (ref 34–46.6)
HGB BLD-MCNC: 7.8 G/DL (ref 12–15.9)
IMM GRANULOCYTES # BLD AUTO: 0.02 10*3/MM3 (ref 0–0.05)
IMM GRANULOCYTES NFR BLD AUTO: 0.2 % (ref 0–0.5)
LYMPHOCYTES # BLD AUTO: 1.22 10*3/MM3 (ref 0.7–3.1)
LYMPHOCYTES NFR BLD AUTO: 15 % (ref 19.6–45.3)
MCH RBC QN AUTO: 29.2 PG (ref 26.6–33)
MCHC RBC AUTO-ENTMCNC: 31.3 G/DL (ref 31.5–35.7)
MCV RBC AUTO: 93.3 FL (ref 79–97)
MONOCYTES # BLD AUTO: 0.52 10*3/MM3 (ref 0.1–0.9)
MONOCYTES NFR BLD AUTO: 6.4 % (ref 5–12)
NEUTROPHILS NFR BLD AUTO: 6.17 10*3/MM3 (ref 1.7–7)
NEUTROPHILS NFR BLD AUTO: 76.2 % (ref 42.7–76)
NRBC BLD AUTO-RTO: 0 /100 WBC (ref 0–0.2)
PLATELET # BLD AUTO: 176 10*3/MM3 (ref 140–450)
PMV BLD AUTO: 10.5 FL (ref 6–12)
POTASSIUM SERPL-SCNC: 4.1 MMOL/L (ref 3.5–5.2)
RBC # BLD AUTO: 2.67 10*6/MM3 (ref 3.77–5.28)
SODIUM SERPL-SCNC: 139 MMOL/L (ref 136–145)
WBC NRBC COR # BLD AUTO: 8.11 10*3/MM3 (ref 3.4–10.8)

## 2025-06-10 PROCEDURE — 85025 COMPLETE CBC W/AUTO DIFF WBC: CPT | Performed by: NURSE PRACTITIONER

## 2025-06-10 PROCEDURE — 80048 BASIC METABOLIC PNL TOTAL CA: CPT | Performed by: INTERNAL MEDICINE

## 2025-06-10 PROCEDURE — 99222 1ST HOSP IP/OBS MODERATE 55: CPT | Performed by: UROLOGY

## 2025-06-10 PROCEDURE — 74176 CT ABD & PELVIS W/O CONTRAST: CPT

## 2025-06-10 PROCEDURE — 82306 VITAMIN D 25 HYDROXY: CPT | Performed by: INTERNAL MEDICINE

## 2025-06-10 PROCEDURE — 25010000002 CEFTRIAXONE PER 250 MG: Performed by: NURSE PRACTITIONER

## 2025-06-10 RX ORDER — BISACODYL 10 MG
10 SUPPOSITORY, RECTAL RECTAL DAILY
Status: DISCONTINUED | OUTPATIENT
Start: 2025-06-10 | End: 2025-06-15 | Stop reason: HOSPADM

## 2025-06-10 RX ADMIN — ACETAMINOPHEN 650 MG: 325 TABLET ORAL at 20:06

## 2025-06-10 RX ADMIN — PANTOPRAZOLE SODIUM 40 MG: 40 TABLET, DELAYED RELEASE ORAL at 05:41

## 2025-06-10 RX ADMIN — DONEPEZIL HYDROCHLORIDE 10 MG: 10 TABLET, FILM COATED ORAL at 20:06

## 2025-06-10 RX ADMIN — SERTRALINE HYDROCHLORIDE 25 MG: 50 TABLET ORAL at 08:04

## 2025-06-10 RX ADMIN — LOPERAMIDE HYDROCHLORIDE 2 MG: 2 CAPSULE ORAL at 11:39

## 2025-06-10 RX ADMIN — LEVOTHYROXINE SODIUM 50 MCG: 0.05 TABLET ORAL at 08:04

## 2025-06-10 RX ADMIN — BISACODYL 10 MG: 10 SUPPOSITORY RECTAL at 16:00

## 2025-06-10 RX ADMIN — GABAPENTIN 300 MG: 300 CAPSULE ORAL at 08:04

## 2025-06-10 RX ADMIN — ATORVASTATIN CALCIUM 40 MG: 40 TABLET, FILM COATED ORAL at 08:04

## 2025-06-10 RX ADMIN — CEFTRIAXONE SODIUM 1000 MG: 1 INJECTION, POWDER, FOR SOLUTION INTRAMUSCULAR; INTRAVENOUS at 16:02

## 2025-06-10 RX ADMIN — OXYCODONE HYDROCHLORIDE 15 MG: 5 TABLET ORAL at 08:03

## 2025-06-10 RX ADMIN — GABAPENTIN 300 MG: 300 CAPSULE ORAL at 20:06

## 2025-06-10 NOTE — PROGRESS NOTES
Patient Name: Jennifer Gomes  Date of Admission: 6/7/2025  Today's Date: 06/10/25  Length of Stay: 3  Primary Care Physician: Katy Leone DO    Subjective   Chief Complaint: Does not want to go to [facility]   Today: Resting in bed on room air.  Patient is alert and oriented x 3, disoriented to situation.  Patient has no complaints of pain or shortness of breath this morning.  She is upset because she does not want to discharge to [facility] but is unable to give me a reason why.  Patient did have several episodes of loose stool yesterday and per nursing staff this has slowed down.  Had no overnight significant events.  Awaiting answer from [facility] on placement.  Documented weights    06/07/25 1017   Weight: 60.8 kg (134 lb)          Intake/Output Summary (Last 24 hours) at 6/10/2025 1502  Last data filed at 6/10/2025 1313  Gross per 24 hour   Intake 480 ml   Output --   Net 480 ml       Results for orders placed during the hospital encounter of 11/12/24    Adult Transthoracic Echo Complete W/ Cont if Necessary Per Protocol    Interpretation Summary    Left ventricular ejection fraction appears to be 66 - 70%.    Left ventricular wall thickness is consistent with mild concentric hypertrophy.    Left ventricular diastolic function is consistent with (grade Ia w/high LAP) impaired relaxation.    Left atrial volume is moderately increased.    Mild pulmonary hypertension is present.       Review of Systems   All pertinent negatives and positives are as above. All other systems have been reviewed and are negative unless otherwise stated.     Objective    Temp:  [97.9 °F (36.6 °C)-98.6 °F (37 °C)] 98.6 °F (37 °C)  Heart Rate:  [88-94] 94  Resp:  [16] 16  BP: (119-149)/(46-66) 142/46  Physical Exam  Vitals reviewed.   Constitutional:       Appearance: She is not toxic-appearing.   HENT:      Head: Normocephalic.      Mouth/Throat:      Mouth: Mucous membranes are moist.   Pulmonary:      Effort: Pulmonary effort is  normal. No respiratory distress.   Abdominal:      Palpations: Abdomen is soft.      Tenderness: There is no abdominal tenderness.   Musculoskeletal:         General: Swelling (right upper extremity - site of IV infiltration?) present.   Skin:     General: Skin is warm.   Neurological:      Mental Status: She is alert.      Motor: Weakness present.   Psychiatric:         Mood and Affect: Mood normal.         Results Review:  I have reviewed the labs, radiology results, and diagnostic studies.    Laboratory Data:   Results from last 7 days   Lab Units 06/10/25  0309 06/09/25  0501 06/08/25  0342   WBC 10*3/mm3 8.11 5.86 7.89   HEMOGLOBIN g/dL 7.8* 7.0* 7.5*   HEMATOCRIT % 24.9* 23.5* 24.1*   PLATELETS 10*3/mm3 176 165 185        Results from last 7 days   Lab Units 06/10/25  0309 06/09/25  0501 06/08/25  0342 06/07/25  1255   SODIUM mmol/L 139 139 138 138   POTASSIUM mmol/L 4.1 4.5 4.7 4.6   CHLORIDE mmol/L 109* 114* 110* 106   CO2 mmol/L 19.0* 19.0* 18.0* 21.0*   BUN mg/dL 41.3* 47.3* 47.3* 48.7*   CREATININE mg/dL 2.77* 2.75* 2.90* 2.77*   CALCIUM mg/dL 8.7 8.5* 8.5* 9.3   BILIRUBIN mg/dL  --  <0.2  --  <0.2   ALK PHOS U/L  --  48  --  60   ALT (SGPT) U/L  --  <5  --  6   AST (SGOT) U/L  --  6  --  12   GLUCOSE mg/dL 116* 100* 117* 103*       Culture Data:     Microbiology Results (last 10 days)       Procedure Component Value - Date/Time    Blood Culture With YOLA - Blood, Arm, Right [[id number]]  (Normal) Collected: 06/08/25 1043    Lab Status: Preliminary result Specimen: Blood from Arm, Right Updated: 06/10/25 1130     Blood Culture No growth at 2 days    Urine Culture - Urine, Urine, Catheter [[id number]]  (Abnormal)  (Susceptibility) Collected: 06/07/25 1148    Lab Status: Final result Specimen: Urine, Catheter Updated: 06/09/25 0935     Urine Culture >100,000 CFU/mL Escherichia coli    Narrative:      Colonization of the urinary tract without infection is common. Treatment is discouraged unless the patient is  symptomatic, pregnant, or undergoing an invasive urologic procedure.    Susceptibility        Escherichia coli      CHULA      Amoxicillin + Clavulanate Resistant      Ampicillin Resistant      Ampicillin + Sulbactam Intermediate      Cefazolin (Urine) Susceptible      Cefepime Susceptible      Ceftazidime Susceptible      Ceftriaxone Susceptible      Cefuroxime axetil Intermediate      Gentamicin Susceptible      Levofloxacin Susceptible      Nitrofurantoin Susceptible      Piperacillin + Tazobactam Susceptible      Trimethoprim + Sulfamethoxazole Susceptible                                    Radiology Data:   Imaging Results (Last 7 Days)       Procedure Component Value Units Date/Time    CT Abdomen Pelvis Without Contrast - In process [[id number]] Resulted: 06/10/25 1356     Updated: 06/10/25 1420    This result has not been signed. Information might be incomplete.      US Renal Bilateral [[id number]] Collected: 06/09/25 1644     Updated: 06/09/25 1649    Narrative:      EXAMINATION: US RENAL BILATERAL-     HISTORY: Acute kidney injury; R62.7-Adult failure to thrive;  R41.82-Altered mental status, unspecified; N17.9-Acute kidney failure,  unspecified; N18.9-Chronic kidney disease, unspecified; N39.0-Urinary  tract infection, site not specified; D64.9-Anemia, unspecified;  Z75.8-Other problems related to medical facilities and other health care     Images are stored in PACS per institutional protocol.     Grayscale and color-flow renal ultrasound.     COMPARISON:  12/18/2024 ultrasound.  11/12/2024 CT.     Normal size and position of both kidneys.  Mildly echogenic renal parenchyma.     Moderate RIGHT hydronephrosis.  No shadowing stone.     The right kidney measures 89 x 48 x 46 mm.     The left kidney measures 100 x 46 x 48 mm.       Impression:      Impression:  1. Moderate RIGHT hydronephrosis.           This report was signed and finalized on 6/9/2025 4:46 PM by Dr. Adam Monroy MD.       XR Chest 1 View  [[id number]] Collected: 06/07/25 1205     Updated: 06/07/25 1210    Narrative:      XR CHEST 1 VW-     HISTORY: fall     COMPARISON: 12/18/2024     FINDINGS: Frontal view of the chest obtained.     Low lung volumes. Mild elevation right hemidiaphragm. Right basilar  densities favoring atelectasis. Cardiomediastinal contour is normal.  Calcified left hilar lymph nodes. No pleural effusion or pneumothorax.  No acute regional bony pathology. Right upper quadrant surgical clips.       Impression:      1. Low lung volumes with right basilar atelectasis and mild elevation  right hemidiaphragm. No pleural effusion or pneumothorax.        This report was signed and finalized on 6/7/2025 12:07 PM by Dr. Blanka Pak MD.       CT Head Without Contrast [[id number]] Collected: 06/07/25 1119     Updated: 06/07/25 1124    Narrative:      CT HEAD WO CONTRAST-      HISTORY: ams      COMPARISON: 12/18/2024     TOTAL DOSE LENGTH PRODUCT: 736.6 mGy.cm. Automated exposure control was  also utilized to decrease patient radiation dose.     TECHNIQUE: Axial images of the brain are obtained without contrast     FINDINGS: Similar moderate generalized volume loss with stable appearing  bilateral periventricular and white matter hypodensities. No  intracranial hemorrhage or mass effect. No convincing acute signs of  ischemia. No extra-axial hematoma or subarachnoid hemorrhage.     Chronic opacification right maxillary sinus. Mastoid air cells  well-aerated. Bony calvarium appears intact.       Impression:         1. No acute intracranial abnormality identified. Similar moderate  generalized volume loss with  chronic small vessel ischemic change.  Chronic opacified right maxillary sinus.     This report was signed and finalized on 6/7/2025 11:21 AM by Dr. Blanka Pak MD.                I have reviewed the patient's current medications.     [Held by provider] amLODIPine, 5 mg, Oral, Q24H  atorvastatin, 40 mg, Oral, Daily  [Held by  provider] carvedilol, 3.125 mg, Oral, BID With Meals  cefTRIAXone, 1,000 mg, Intravenous, Q24H  donepezil, 10 mg, Oral, Nightly  gabapentin, 300 mg, Oral, Q12H  levothyroxine, 50 mcg, Oral, QAM  pantoprazole, 40 mg, Oral, Q AM  sertraline, 25 mg, Oral, Daily  sodium chloride, 10 mL, Intravenous, Q12H            Assessment/Plan   Assessment  Active Hospital Problems    Diagnosis     **Altered mental status     TOMÁS (acute kidney injury)     Hydronephrosis     Acute UTI (urinary tract infection)     Essential hypertension     Anemia        Treatment Plan  1.  Altered mental status-improvement noted.  Continue to monitor.       2.  Acute kidney injury, nephrology recommendations reviewed. Creatinine on admission 2.9 yesterday and today 6/10 creatinine 2.77.  Renal ultrasound revealed moderate right hydronephrosis.  Consult urology today and anticipate abdominal imaging to assess potential source of obstructive uropathy.     3.  Urinary tract infection, continue ceftriaxone on day 4, cultures revealed E. Coli which is susceptible to ceftriaxone     4. Anemia of chronic disease- Hgb trend from admission hgb 7.8 and today 6/10 7.8.  Results of iron profile reviewed. IV ferric gluconate started yesterday 6/9.  Previous labs reviewed and chronic anemia pattern noted.     5.  Essential hypertension-now with hypotension, blood pressure medications on hold    **Addendum: Waiting on final radiology read of her CT scan of the abdomen and pelvis.  Imaging reveals evidence of constipation with a large stool ball near the rectum.  Patient has been passing some liquid stool.  She recently has been on loperamide which will be discontinued.  Patient may require manual disimpaction and will also place Dulcolax suppository in addition to an order for an enema.  I suspect her significant constipation is contributing to her obstructing urinary symptoms.  In the interim, would prefer to go ahead and place a Smith catheter.  Bowel regimen  will be added as noted above.  Final report from radiology is pending.  Appreciate urology assistance as well.         Medical Decision Making     3 problems, acute, moderate complexity, unchanged  2 Problems, chronic, moderate complexity, unchanged     Number and Complexity of problems: 5  Differential Diagnosis: None     Conditions and Status        Condition is unchanged.     [facility] Data  External documents reviewed: None  Cardiac tracing (EKG, telemetry) interpretation: Reviewed  Radiology interpretation: Reviewed  Labs reviewed: Yes  Any tests that were considered but not ordered: None     Decision rules/scores evaluated (example [id number], Wells, etc): None     Discussed with: Patient     Care Planning  Shared decision making: Patient  Code status and discussions: No CPR  Surrogate Decision Maker spouse, Adams Gomes     Disposition  Social Determinants of Health that impact treatment or disposition: None  I expect the patient to be discharged to SNF ([facility]) in hopefully 2-3 days.       Electronically signed by Abdirahman Schroeder MD-BC, 06/10/25, 15:02 CDT.

## 2025-06-10 NOTE — CASE MANAGEMENT/SOCIAL WORK
Continued Stay Note   [place]     Patient Name: Jennifer Gomes  MRN: [id number]  Today's Date: 6/10/2025    Admit Date: 6/7/2025    Plan: Referral to [facility]   Discharge Plan       Row Name 06/10/25 1105       Plan    Plan Comments Waiting on answer from [facility]. If bed offered, precert will be started.                   Discharge Codes    No documentation.                       MICHAEL Burt

## 2025-06-10 NOTE — CONSULTS
[facility]   Consult Note    Patient Name: Jennifer Gomes  : 1940  MRN: [id number]  Primary Care Physician:  Katy Leone DO  Referring Physician: No Known Provider  Date of admission: 2025    Subjective   Subjective     Reason for Consult/ Chief Complaint: Urosepsis, right hydronephrosis    HPI:  Jennifer Gomes is a 84 y.o. female who has been seen urologically in October and November by Dr. Gallegos and Jb.  He was admitted in 2024 with urinary tract infection and mental status changes.  She was found to have a distended bladder and bilateral hydronephrosis.  She responded to Smith catheter placement with resolution of her hydronephrosis.  She returned to the hospital in 2024 after traumatically removing her own Smith catheter.  Her Smith catheter was replaced and she was observed due to other infection with associated mental status changes.  At that time CT scan with her Smith catheter out showed mildly distended bladder with associated right hydronephrosis.  That hydronephrosis resolved with Smith catheter replacement.  In December she was seen by the nurse practitioner in the urology office and insisted on having her Smith catheter removed.  He apparently has not had a Smith catheter since that time.  She returned to the emergency room at [facility] on 2025 with altered mental status and evidence of urinary infection.  A renal ultrasound was performed the next day showing moderate right hydronephrosis.  There was no bladder imaging on that study.  There are no charted postvoid residual checks.  Urology was consulted 2 days after her ultrasound to address her right hydronephrosis.    Review of Systems   Review of systems could not be obtained due to   unreliable historian    Personal History     Past Medical History:   Diagnosis Date    Anxiety     Arthritis     Bronchitis     Cancer     uterine    Chronic pain     Depression     Disease of thyroid  gland     Fibromyalgia     GERD (gastroesophageal reflux disease)     Headache     History of transfusion     AS     Hyperlipidemia     Hypertension     Incontinence     Insomnia     Leg pain     Lumbar stenosis     Migraines     Peptic ulcer     Restless legs     Sleep apnea     NO C-PAP    UTI (urinary tract infection)     Vaginal bleeding        Past Surgical History:   Procedure Laterality Date    BLADDER REPAIR      MESH HAD TO BE REMOVED IN 2013    BREAST BIOPSY Right 2017    benign    BREAST CYST EXCISION Left 1982    CARDIAC CATHETERIZATION      CARPAL TUNNEL RELEASE      CATARACT EXTRACTION W/ INTRAOCULAR LENS  IMPLANT, BILATERAL      CHOLECYSTECTOMY WITH INTRAOPERATIVE CHOLANGIOGRAM N/A 2023    Procedure: CHOLECYSTECTOMY LAPAROSCOPIC INTRAOPERATIVE CHOLANGIOGRAM;  Surgeon: Gerardo Arciniega MD;  Location:  [facility] OR;  Service: General;  Laterality: N/A;    COLONOSCOPY      COLONOSCOPY N/A 10/01/2021    Procedure: COLONOSCOPY WITH ANESTHESIA;  Surgeon: Tom Velasco DO;  Location:  [facility] ENDOSCOPY;  Service: Gastroenterology;  Laterality: N/A;  pre: change in bowel habits  post: diverticulosis. hemorrhoids.   Olivia Mora APRN        CYSTECTOMY      D & C HYSTEROSCOPY N/A 2017    Procedure: DILATATION AND CURETTAGE HYSTEROSCOPY;  Surgeon: Shasta Madrigal MD;  Location: [facility] OR;  Service:     DILATION AND CURETTAGE, DIAGNOSTIC / THERAPEUTIC  2008    ENDOSCOPY  2010    Short segment of Arriola's,Moderate chroninc esophagogastritis and negative H.pylori    ENDOSCOPY N/A 2017    Procedure: ESOPHAGOGASTRODUODENOSCOPY WITH ANESTHESIA;  Surgeon: Tom Velasco DO;  Location: [facility] ENDOSCOPY;  Service:     EYE SURGERY      RETINA    HEMORRHOIDECTOMY SIGMOIDOSCOPY N/A 3/21/2023    Procedure: HEMORRHOIDECTOMY WITH EXAM UNDER ANESTHESIA;  Surgeon: Holly Chavez MD;  Location:  [facility] OR;  Service: General;  Laterality: N/A;    HYSTERECTOMY  2017    [facility]  TIBIA/FIBULA FRACTURES Left 2000    TRANSVAGINAL TAPING SUSPENSION N/A 11/06/2017    Procedure: VAGINAL MESH REVISION;  Surgeon: Shasta Madrigal MD;  Location: [facility] OR;  Service:     VAGINAL MESH REVISION  2013       Family History: family history includes Cancer in her paternal grandmother; Diabetes in her mother and sister; Lung cancer in her paternal grandfather; Lymphoma in her brother; Multiple myeloma in her mother; Ovarian cancer in her paternal aunt; Prostate cancer in her brother; Stroke in her father. Otherwise pertinent FHx was reviewed and not pertinent to current issue.    Social History:  reports that she has never smoked. She has never used smokeless tobacco. She reports that she does not currently use alcohol. She reports that she does not use drugs.    Home Medications:  acetaminophen, amLODIPine, atorvastatin, butalbital-acetaminophen-caffeine, carvedilol, cyclobenzaprine, donepezil, ergocalciferol, gabapentin, lansoprazole, levothyroxine, oxyCODONE, and sertraline    Allergies:  Allergies   Allergen Reactions    Ropinirole Hcl Shortness Of Breath    Codeine Itching and Mental Status Change    Definity [Perflutren Lipid Microsphere] Other (See Comments)     Severe back pain    Ambien [Zolpidem] Other (See Comments)     HYPER     Eszopiclone Other (See Comments)     MAKES PT HYPER     Pregabalin Dizziness    Tizanidine Other (See Comments)     Terrible nightmares       Objective    Objective     Vitals:   Temp:  [97.9 °F (36.6 °C)-98.6 °F (37 °C)] 98.6 °F (37 °C)  Heart Rate:  [88-94] 94  Resp:  [16] 16  BP: (119-149)/(46-66) 142/46    Physical Exam:   Constitutional: Awake, alert   Eyes: PERRLA, sclerae anicteric, no conjunctival injection   HENT: NCAT, mucous membranes moist   Neck: Supple, no thyromegaly, no lymphadenopathy, trachea midline   Respiratory: Clear to auscultation bilaterally, nonlabored respirations    Cardiovascular: RRR, no murmurs, rubs, or gallops, palpable pedal pulses  bilaterally   Gastrointestinal: Positive bowel sounds, soft, nontender, nondistended   Musculoskeletal: No bilateral ankle edema, no clubbing or cyanosis to extremities   Psychiatric: Appropriate affect, cooperative   Neurologic: Oriented x 3, strength symmetric in all extremities, Cranial Nerves grossly intact to confrontation, speech clear   Skin: No rashes       Result Review    Result Review:  I have personally reviewed the results from the time of this admission to 6/10/2025 14:05 CDT and agree with these findings:  [x]  Laboratory list / accordion  [x]  Microbiology  [x]  Radiology  []  EKG/Telemetry   []  Cardiology/Vascular   [x]  Pathology  [x]  Old records  []  Other:  Most notable findings include: Per HPI      Assessment & Plan   Assessment / Plan     Brief Patient Summary:  Jennifer Gomes is a 84 y.o. female who is admitted with altered mental status and an E. coli urinary infection.  She has been found to have right hydronephrosis.  She has previously had similar hydronephrosis which resolved with Smith catheter placement.    Active Hospital Problems:  Active Hospital Problems    Diagnosis     **Altered mental status     TOMÁS (acute kidney injury)     Acute UTI (urinary tract infection)     Essential hypertension     Anemia      Plan: A CT of the abdomen and pelvis is being obtained today to evaluate for ureteral stone or other form of obstruction.  I anticipate this will only show a full bladder associated with hydronephrosis at which point we will just have a Smith catheter placed.  If she needs surgical intervention we will deal with that.    Rogers Cancino MD   r hand pain

## 2025-06-10 NOTE — PROGRESS NOTES
"Nephrology ([facility]) Progress Note      Patient:  Jennifer Gomes  YOB: 1940  Date of Service: 6/10/2025  MRN: [id number]   Acct: [id number]   Primary Care Physician: Katy Leone DO  Advance Directive:   Code Status and Medical Interventions: No CPR (Do Not Attempt to Resuscitate); Limited Support; No intubation (DNI)   Ordered at: 06/07/25 1414     Code Status (Patient has no pulse and is not breathing):    No CPR (Do Not Attempt to Resuscitate)     Medical Interventions (Patient has pulse or is breathing):    Limited Support     Medical Intervention Limits:    No intubation (DNI)     Level Of Support Discussed With:    Health Care Surrogate     Admit Date: 6/7/2025       Hospital Day: 3  Referring Provider: No Known Provider      Patient personally seen and examined.  Complete chart including Consults, Notes, Operative Reports, Labs, Cardiology, and Radiology studies reviewed as able.        Subjective:  Jennifer Gomes is a 84 y.o. female for whom we were consulted for evaluation and treatment of acute kidney injury with a history of chronic kidney disease of unknown stage.  No prior nephrologic evaluations.  She also has a history of hypertension, GERD, fibromyalgia, depression, sleep apnea and dementia.  She presented to the emergency room with worsening mental status and was diagnosed with cystitis and acute kidney injury.  She was given IV antibiotics and fluids for treatment.    Today, no overnight events.  She denied current chest pain, shortness of air at rest, nausea or vomiting.  She had reported a several year history of \"jerking\" movements and was asking for further evaluation about that.  Family also had a question about pain medication regimen.  IV fluids infusing.  Patient family noted she tolerated diet without issue    Allergies:  Ropinirole hcl, Codeine, Definity [perflutren lipid microsphere], Ambien [zolpidem], Eszopiclone, Pregabalin, and " Tizanidine    Home Meds:  Medications Prior to Admission   Medication Sig Dispense Refill Last Dose/Taking    amLODIPine (NORVASC) 5 MG tablet Take 1 tablet by mouth Daily.   Taking    atorvastatin (LIPITOR) 40 MG tablet Take 1 tablet by mouth Daily.   Taking    butalbital-acetaminophen-caffeine (FIORICET, ESGIC) -40 MG per tablet Take 1 tablet by mouth Every 12 (Twelve) Hours.   Taking    carvedilol (COREG) 3.125 MG tablet Take 1 tablet by mouth 2 (Two) Times a Day With Meals.   Taking    cyclobenzaprine (FLEXERIL) 10 MG tablet Take 1 tablet by mouth Every 12 (Twelve) Hours.   Taking    donepezil (ARICEPT) 10 MG tablet Take 1 tablet by mouth Every Night.   Taking    ergocalciferol (ERGOCALCIFEROL) 80662 units capsule Take 1 capsule by mouth 1 (One) Time Per Week. Saturday   Taking    gabapentin (NEURONTIN) 300 MG capsule Take 1 capsule by mouth 2 (Two) Times a Day.   Taking    lansoprazole (PREVACID) 30 MG capsule Take 1 capsule by mouth Daily.   Taking    levothyroxine (SYNTHROID, LEVOTHROID) 50 MCG tablet Take 1 tablet by mouth Daily.   Taking    oxyCODONE (ROXICODONE) 15 MG immediate release tablet Take 1 tablet by mouth 3 times a day.   Taking    sertraline (ZOLOFT) 25 MG tablet Take 1 tablet by mouth Daily. 90 tablet 0 Taking    acetaminophen (TYLENOL) 325 MG tablet Take 2 tablets by mouth Every 6 (Six) Hours As Needed for Mild Pain.          Medicines:  Current Facility-Administered Medications   Medication Dose Route Frequency Provider Last Rate Last Admin    acetaminophen (TYLENOL) tablet 650 mg  650 mg Oral Q4H PRN Holli Menendez APRN        Or    acetaminophen (TYLENOL) 160 MG/5ML oral solution 650 mg  650 mg Oral Q4H PRN Holli Menendez, APRN        Or    acetaminophen (TYLENOL) suppository 650 mg  650 mg Rectal Q4H PRN Holli Menendez APRN        [Held by provider] amLODIPine (NORVASC) tablet 5 mg  5 mg Oral Q24H Holli Menendez, APRN        atorvastatin (LIPITOR) tablet 40 mg  40 mg Oral Daily  Holli Menendez APRN   40 mg at 06/10/25 0804    sennosides-docusate (PERICOLACE) 8.6-50 MG per tablet 2 tablet  2 tablet Oral BID PRN Holli Menendez APRN        And    polyethylene glycol (MIRALAX) packet 17 g  17 g Oral Daily PRN Holli Menendez APRN        And    bisacodyl (DULCOLAX) EC tablet 5 mg  5 mg Oral Daily PRN Holli Menendez APRN        And    bisacodyl (DULCOLAX) suppository 10 mg  10 mg Rectal Daily PRN Holli Menendez APRN        Calcium Replacement - Follow Nurse / BPA Driven Protocol   Not Applicable PRN Holli Menendez APRN        [Held by provider] carvedilol (COREG) tablet 3.125 mg  3.125 mg Oral BID With Meals Holli Menendez APRN        cefTRIAXone (ROCEPHIN) 1,000 mg in sodium chloride 0.9 % 100 mL MBP  1,000 mg Intravenous Q24H Holli Menendez APRN 200 mL/hr at 06/09/25 1613 1,000 mg at 06/09/25 1613    donepezil (ARICEPT) tablet 10 mg  10 mg Oral Nightly Holli Menendez APRN   10 mg at 06/09/25 2003    gabapentin (NEURONTIN) capsule 300 mg  300 mg Oral Q12H Holli Menendez APRN   300 mg at 06/10/25 0804    levothyroxine (SYNTHROID, LEVOTHROID) tablet 50 mcg  50 mcg Oral QAM Holli Menendez APRN   50 mcg at 06/10/25 0804    loperamide (IMODIUM) capsule 2 mg  2 mg Oral 4x Daily PRN Abdirahman Schroeder MD   2 mg at 06/10/25 1139    Magnesium Standard Dose Replacement - Follow Nurse / BPA Driven Protocol   Not Applicable PRN Holli Menendez APRN        nitroglycerin (NITROSTAT) SL tablet 0.4 mg  0.4 mg Sublingual Q5 Min PRN Holli Menendez APRN        ondansetron ODT (ZOFRAN-ODT) disintegrating tablet 4 mg  4 mg Oral Q6H PRN Holli Menendez APRN        Or    ondansetron (ZOFRAN) injection 4 mg  4 mg Intravenous Q6H PRN Holli Menendez APRN        oxyCODONE (ROXICODONE) immediate release tablet 15 mg  15 mg Oral TID PRN Holli Menendez APRN   15 mg at 06/10/25 0803    pantoprazole (PROTONIX) EC tablet 40 mg  40 mg Oral Q AM Holli Menendez APRN   40 mg at 06/10/25 0539     Phosphorus Replacement - Follow Nurse / BPA Driven Protocol   Not Applicable PRN Holli Menendez, APRN        Potassium Replacement - Follow Nurse / BPA Driven Protocol   Not Applicable PRN Holli Menendez, APRN        sertraline (ZOLOFT) tablet 25 mg  25 mg Oral Daily Holli Menendez APRN   25 mg at 06/10/25 0804    sodium chloride 0.45 % 925 mL with sodium bicarbonate 8.4 % 75 mEq infusion   Intravenous Continuous [employer]  mL/hr at 25 2142 New Bag at 25 214    sodium chloride 0.9 % flush 10 mL  10 mL Intravenous Q12H Holli Menendez APRN   10 mL at 25    sodium chloride 0.9 % flush 10 mL  10 mL Intravenous PRN Holli Menendez, APRN        sodium chloride 0.9 % infusion 40 mL  40 mL Intravenous PRN Holli Menendez, APRN           Past Medical History:  Past Medical History:   Diagnosis Date    Anxiety     Arthritis     Bronchitis     Cancer     uterine    Chronic pain     Depression     Disease of thyroid gland     Fibromyalgia     GERD (gastroesophageal reflux disease)     Headache     History of transfusion     AS     Hyperlipidemia     Hypertension     Incontinence     Insomnia     Leg pain     Lumbar stenosis     Migraines     Peptic ulcer     Restless legs     Sleep apnea     NO C-PAP    UTI (urinary tract infection)     Vaginal bleeding        Past Surgical History:  Past Surgical History:   Procedure Laterality Date    BLADDER REPAIR      MESH HAD TO BE REMOVED IN     BREAST BIOPSY Right 2017    benign    BREAST CYST EXCISION Left     CARDIAC CATHETERIZATION      CARPAL TUNNEL RELEASE      CATARACT EXTRACTION W/ INTRAOCULAR LENS  IMPLANT, BILATERAL      CHOLECYSTECTOMY WITH INTRAOPERATIVE CHOLANGIOGRAM N/A 2023    Procedure: CHOLECYSTECTOMY LAPAROSCOPIC INTRAOPERATIVE CHOLANGIOGRAM;  Surgeon: Gerardo Arciniega MD;  Location: [facility];  Service: General;  Laterality: N/A;    COLONOSCOPY      COLONOSCOPY N/A 10/01/2021    Procedure: COLONOSCOPY WITH  ANESTHESIA;  Surgeon: Tom Velasco DO;  Location: [facility] ENDOSCOPY;  Service: Gastroenterology;  Laterality: N/A;  pre: change in bowel habits  post: diverticulosis. hemorrhoids.   Olivia Mora APRN        CYSTECTOMY      D & C HYSTEROSCOPY N/A 11/06/2017    Procedure: DILATATION AND CURETTAGE HYSTEROSCOPY;  Surgeon: Shasta Madrigal MD;  Location: [facility] OR;  Service:     DILATION AND CURETTAGE, DIAGNOSTIC / THERAPEUTIC  2008    ENDOSCOPY  09/23/2010    Short segment of Arriola's,Moderate chroninc esophagogastritis and negative H.pylori    ENDOSCOPY N/A 09/25/2017    Procedure: ESOPHAGOGASTRODUODENOSCOPY WITH ANESTHESIA;  Surgeon: Tom Velasco DO;  Location: [facility] ENDOSCOPY;  Service:     EYE SURGERY      RETINA    HEMORRHOIDECTOMY SIGMOIDOSCOPY N/A 3/21/2023    Procedure: HEMORRHOIDECTOMY WITH EXAM UNDER ANESTHESIA;  Surgeon: Holly Chavez MD;  Location: [facility] OR;  Service: General;  Laterality: N/A;    HYSTERECTOMY  12/20/2017    ORIF TIBIA/FIBULA FRACTURES Left 2000    TRANSVAGINAL TAPING SUSPENSION N/A 11/06/2017    Procedure: VAGINAL MESH REVISION;  Surgeon: Shasta Madrigal MD;  Location: [facility] OR;  Service:     VAGINAL MESH REVISION  2013       Family History  Family History   Problem Relation Age of Onset    Diabetes Mother     Multiple myeloma Mother     Stroke Father     Diabetes Sister     Prostate cancer Brother     Lymphoma Brother         NHL    Ovarian cancer Paternal Aunt     Cancer Paternal Grandmother         metastatic    Lung cancer Paternal Grandfather     Colon cancer Neg Hx     Esophageal cancer Neg Hx     Breast cancer Neg Hx        Social History  Social History     Socioeconomic History    Marital status:    Tobacco Use    Smoking status: Never    Smokeless tobacco: Never   Vaping Use    Vaping status: Never Used   Substance and Sexual Activity    Alcohol use: Not Currently     Comment: occasional    Drug use: No    Sexual activity: Defer       Review of  Systems:  History obtained from chart review and the patient  General ROS: No fever or chills  Respiratory ROS: No cough, shortness of breath, wheezing  Cardiovascular ROS: No chest pain or palpitations  Gastrointestinal ROS: No abdominal pain or melena  Genito-Urinary ROS: No dysuria or hematuria  Psych ROS: No anxiety and depression  14 point ROS reviewed with the patient and negative except as noted above and in the HPI unless unable to obtain.    Objective:  Patient Vitals for the past 24 hrs:   BP Temp Temp src Pulse Resp SpO2   06/10/25 1115 142/46 98.6 °F (37 °C) Oral 94 16 100 %   06/10/25 0808 119/52 98.6 °F (37 °C) Oral 92 16 94 %   06/10/25 0317 139/58 98.6 °F (37 °C) Oral 91 16 99 %   06/09/25 2013 149/66 98 °F (36.7 °C) Oral 89 16 100 %   06/09/25 1737 134/57 97.9 °F (36.6 °C) Oral 88 16 100 %       Intake/Output Summary (Last 24 hours) at 6/10/2025 1307  Last data filed at 6/10/2025 0900  Gross per 24 hour   Intake 240 ml   Output --   Net 240 ml     General: awake/alert   Chest:  clear to auscultation bilaterally without respiratory distress  CVS: regular rate and rhythm  Abdominal: soft, nontender, positive bowel sounds  Extremities: no cyanosis or edema  Skin: warm and dry without rash      Labs:  Results from last 7 days   Lab Units 06/10/25  0309 06/09/25  0501 06/08/25  0342   WBC 10*3/mm3 8.11 5.86 7.89   HEMOGLOBIN g/dL 7.8* 7.0* 7.5*   HEMATOCRIT % 24.9* 23.5* 24.1*   PLATELETS 10*3/mm3 176 165 185         Results from last 7 days   Lab Units 06/10/25  0309 06/09/25  0501 06/08/25  0342 06/07/25  1255   SODIUM mmol/L 139 139 138 138   POTASSIUM mmol/L 4.1 4.5 4.7 4.6   CHLORIDE mmol/L 109* 114* 110* 106   CO2 mmol/L 19.0* 19.0* 18.0* 21.0*   BUN mg/dL 41.3* 47.3* 47.3* 48.7*   CREATININE mg/dL 2.77* 2.75* 2.90* 2.77*   CALCIUM mg/dL 8.7 8.5* 8.5* 9.3   EGFR mL/min/1.73 16.6* 16.7* 15.7* 16.6*   BILIRUBIN mg/dL  --  <0.2  --  <0.2   ALK PHOS U/L  --  48  --  60   ALT (SGPT) U/L  --  <5  --  6    AST (SGOT) U/L  --  6  --  12   GLUCOSE mg/dL 116* 100* 117* 103*       Radiology:   Imaging Results (Last 72 Hours)       Procedure Component Value Units Date/Time    US Renal Bilateral [[id number]] Collected: 06/09/25 1644     Updated: 06/09/25 1649    Narrative:      EXAMINATION: US RENAL BILATERAL-     HISTORY: Acute kidney injury; R62.7-Adult failure to thrive;  R41.82-Altered mental status, unspecified; N17.9-Acute kidney failure,  unspecified; N18.9-Chronic kidney disease, unspecified; N39.0-Urinary  tract infection, site not specified; D64.9-Anemia, unspecified;  Z75.8-Other problems related to medical facilities and other health care     Images are stored in PACS per institutional protocol.     Grayscale and color-flow renal ultrasound.     COMPARISON:  12/18/2024 ultrasound.  11/12/2024 CT.     Normal size and position of both kidneys.  Mildly echogenic renal parenchyma.     Moderate RIGHT hydronephrosis.  No shadowing stone.     The right kidney measures 89 x 48 x 46 mm.     The left kidney measures 100 x 46 x 48 mm.       Impression:      Impression:  1. Moderate RIGHT hydronephrosis.           This report was signed and finalized on 6/9/2025 4:46 PM by Dr. Adam Monroy MD.               Culture:  Blood Culture   Date Value Ref Range Status   06/08/2025 No growth at 24 hours  Preliminary     Urine Culture   Date Value Ref Range Status   06/07/2025 >100,000 CFU/mL Escherichia coli (A)  Final         Assessment   Acute kidney injury  Acute cystitis  Metabolic acidosis  Anemia with iron deficiency  Dementia  Chronic kidney disease-unknown stage  Hypertension with recent hypotension    Plan:  Discussed with patient, nursing  Workup reviewed today  Monitor labs  Fluids adjustments as per orders  Defer further neurologic workup to primary service  Transfuse packed red blood cells as needed for hemoglobin less than 7      Basil Bermudez MD  6/10/2025  13:07 CDT

## 2025-06-10 NOTE — PLAN OF CARE
Goal Outcome Evaluation:  Plan of Care Reviewed With: patient        Progress: no change  Outcome Evaluation: Pt A&Ox3. Bedrest w/ good bed mobility. Mcclain inserted. Enema to be given. C/o of pain w/ PRN meds given. IVF and abx cont per orders. Mistrustful of staff this afternoon. Bacame combative during mcclain insertion. Refused evening pain medication after stating she was in pain and previously agreeing to meds. Bed alarm on. Safety maintained.

## 2025-06-10 NOTE — PLAN OF CARE
Goal Outcome Evaluation:  Plan of Care Reviewed With: patient        Progress: improving  Outcome Evaluation: Patient A.O x 2, forgetful. VSS. On room air. NSR on telemetry. Tolerating IV fluids. Incontinent of bowel and bladder. Was able to sleep well overnight. No PRNs requested. Safety maintained.

## 2025-06-11 ENCOUNTER — APPOINTMENT (OUTPATIENT)
Dept: ULTRASOUND IMAGING | Facility: HOSPITAL | Age: 85
DRG: 690 | End: 2025-06-11
Payer: MEDICARE

## 2025-06-11 ENCOUNTER — TELEPHONE (OUTPATIENT)
Dept: FAMILY MEDICINE CLINIC | Facility: CLINIC | Age: 85
End: 2025-06-11
Payer: MEDICARE

## 2025-06-11 ENCOUNTER — APPOINTMENT (OUTPATIENT)
Dept: GENERAL RADIOLOGY | Facility: HOSPITAL | Age: 85
DRG: 690 | End: 2025-06-11
Payer: MEDICARE

## 2025-06-11 DIAGNOSIS — R53.1 GENERALIZED WEAKNESS: Primary | ICD-10-CM

## 2025-06-11 PROBLEM — K59.00 CONSTIPATION: Status: ACTIVE | Noted: 2019-04-12

## 2025-06-11 LAB
ANION GAP SERPL CALCULATED.3IONS-SCNC: 10 MMOL/L (ref 5–15)
BUN SERPL-MCNC: 34.2 MG/DL (ref 8–23)
BUN/CREAT SERPL: 15.9 (ref 7–25)
CALCIUM SPEC-SCNC: 8.4 MG/DL (ref 8.6–10.5)
CHLORIDE SERPL-SCNC: 108 MMOL/L (ref 98–107)
CO2 SERPL-SCNC: 23 MMOL/L (ref 22–29)
CREAT SERPL-MCNC: 2.15 MG/DL (ref 0.57–1)
DEPRECATED RDW RBC AUTO: 43.7 FL (ref 37–54)
EGFRCR SERPLBLD CKD-EPI 2021: 22.5 ML/MIN/1.73
ERYTHROCYTE [DISTWIDTH] IN BLOOD BY AUTOMATED COUNT: 13.2 % (ref 12.3–15.4)
GLUCOSE SERPL-MCNC: 110 MG/DL (ref 65–99)
HCT VFR BLD AUTO: 22.4 % (ref 34–46.6)
HGB BLD-MCNC: 7.1 G/DL (ref 12–15.9)
MCH RBC QN AUTO: 28.5 PG (ref 26.6–33)
MCHC RBC AUTO-ENTMCNC: 31.7 G/DL (ref 31.5–35.7)
MCV RBC AUTO: 90 FL (ref 79–97)
PLATELET # BLD AUTO: 137 10*3/MM3 (ref 140–450)
PMV BLD AUTO: 10.1 FL (ref 6–12)
POTASSIUM SERPL-SCNC: 3.5 MMOL/L (ref 3.5–5.2)
POTASSIUM SERPL-SCNC: 3.6 MMOL/L (ref 3.5–5.2)
RBC # BLD AUTO: 2.49 10*6/MM3 (ref 3.77–5.28)
SODIUM SERPL-SCNC: 141 MMOL/L (ref 136–145)
WBC NRBC COR # BLD AUTO: 6.47 10*3/MM3 (ref 3.4–10.8)

## 2025-06-11 PROCEDURE — 99231 SBSQ HOSP IP/OBS SF/LOW 25: CPT | Performed by: UROLOGY

## 2025-06-11 PROCEDURE — 76775 US EXAM ABDO BACK WALL LIM: CPT

## 2025-06-11 PROCEDURE — 25010000002 CEFTRIAXONE PER 250 MG: Performed by: NURSE PRACTITIONER

## 2025-06-11 PROCEDURE — 85027 COMPLETE CBC AUTOMATED: CPT | Performed by: INTERNAL MEDICINE

## 2025-06-11 PROCEDURE — 84132 ASSAY OF SERUM POTASSIUM: CPT | Performed by: INTERNAL MEDICINE

## 2025-06-11 PROCEDURE — 80048 BASIC METABOLIC PNL TOTAL CA: CPT | Performed by: INTERNAL MEDICINE

## 2025-06-11 PROCEDURE — 74018 RADEX ABDOMEN 1 VIEW: CPT

## 2025-06-11 PROCEDURE — 97530 THERAPEUTIC ACTIVITIES: CPT

## 2025-06-11 RX ORDER — POTASSIUM CHLORIDE 1.5 G/1.58G
20 POWDER, FOR SOLUTION ORAL ONCE
Status: COMPLETED | OUTPATIENT
Start: 2025-06-11 | End: 2025-06-11

## 2025-06-11 RX ORDER — BISACODYL 5 MG/1
5 TABLET, DELAYED RELEASE ORAL DAILY PRN
Status: DISCONTINUED | OUTPATIENT
Start: 2025-06-11 | End: 2025-06-15 | Stop reason: HOSPADM

## 2025-06-11 RX ORDER — AMOXICILLIN 250 MG
2 CAPSULE ORAL 2 TIMES DAILY
Status: DISCONTINUED | OUTPATIENT
Start: 2025-06-11 | End: 2025-06-15 | Stop reason: HOSPADM

## 2025-06-11 RX ORDER — POLYETHYLENE GLYCOL 3350 17 G/17G
17 POWDER, FOR SOLUTION ORAL DAILY PRN
Status: DISCONTINUED | OUTPATIENT
Start: 2025-06-11 | End: 2025-06-14 | Stop reason: SDUPTHER

## 2025-06-11 RX ORDER — POLYETHYLENE GLYCOL 3350 17 G/17G
17 POWDER, FOR SOLUTION ORAL DAILY
Status: DISCONTINUED | OUTPATIENT
Start: 2025-06-11 | End: 2025-06-15 | Stop reason: HOSPADM

## 2025-06-11 RX ADMIN — PANTOPRAZOLE SODIUM 40 MG: 40 TABLET, DELAYED RELEASE ORAL at 05:48

## 2025-06-11 RX ADMIN — LEVOTHYROXINE SODIUM 50 MCG: 0.05 TABLET ORAL at 05:48

## 2025-06-11 RX ADMIN — CEFTRIAXONE SODIUM 1000 MG: 1 INJECTION, POWDER, FOR SOLUTION INTRAMUSCULAR; INTRAVENOUS at 13:50

## 2025-06-11 RX ADMIN — DONEPEZIL HYDROCHLORIDE 10 MG: 10 TABLET, FILM COATED ORAL at 20:16

## 2025-06-11 RX ADMIN — ATORVASTATIN CALCIUM 40 MG: 40 TABLET, FILM COATED ORAL at 08:13

## 2025-06-11 RX ADMIN — SODIUM BICARBONATE: 84 INJECTION, SOLUTION INTRAVENOUS at 17:19

## 2025-06-11 RX ADMIN — SERTRALINE HYDROCHLORIDE 25 MG: 50 TABLET ORAL at 08:13

## 2025-06-11 RX ADMIN — OXYCODONE HYDROCHLORIDE 15 MG: 5 TABLET ORAL at 20:16

## 2025-06-11 RX ADMIN — OXYCODONE HYDROCHLORIDE 15 MG: 5 TABLET ORAL at 11:00

## 2025-06-11 RX ADMIN — GABAPENTIN 300 MG: 300 CAPSULE ORAL at 20:16

## 2025-06-11 RX ADMIN — POTASSIUM CHLORIDE 20 MEQ: 1.5 POWDER, FOR SOLUTION ORAL at 16:11

## 2025-06-11 RX ADMIN — BISACODYL 10 MG: 10 SUPPOSITORY RECTAL at 08:13

## 2025-06-11 RX ADMIN — CARVEDILOL 3.12 MG: 3.12 TABLET, FILM COATED ORAL at 16:11

## 2025-06-11 RX ADMIN — DOCUSATE SODIUM 50 MG AND SENNOSIDES 8.6 MG 2 TABLET: 8.6; 5 TABLET, FILM COATED ORAL at 20:16

## 2025-06-11 RX ADMIN — Medication 10 ML: at 08:13

## 2025-06-11 RX ADMIN — GABAPENTIN 300 MG: 300 CAPSULE ORAL at 08:13

## 2025-06-11 RX ADMIN — POLYETHYLENE GLYCOL 3350 17 G: 17 POWDER, FOR SOLUTION ORAL at 16:11

## 2025-06-11 RX ADMIN — SODIUM BICARBONATE: 84 INJECTION, SOLUTION INTRAVENOUS at 02:03

## 2025-06-11 NOTE — PROGRESS NOTES
"Patient Name: Jennifer Gomes  Date of Admission: 6/7/2025  Today's Date: 06/11/25  Length of Stay: 4  Primary Care Physician: Katy Leone DO    Subjective   Chief Complaint: follow-up constipation  HPI   Patient resting comfortably in bed.  When I asked her if she has been moving her bowels, she indicated that she had not.  I spent quite a bit of time discussing with her the results of her most recent scans, including her significant constipation.  Manual disimpaction and a suppository and enema were ordered.  No pain in her abdomen per her report.  A Smith catheter is now in place.  We discussed that plans are in place for her to go home with home health services, and this was very exciting for her - \"that's the motivation I need!\"      Documented weights    06/07/25 1017   Weight: 60.8 kg (134 lb)          Intake/Output Summary (Last 24 hours) at 6/11/2025 1331  Last data filed at 6/11/2025 1150  Gross per 24 hour   Intake 240 ml   Output 1325 ml   Net -1085 ml       Results for orders placed during the hospital encounter of 11/12/24    Adult Transthoracic Echo Complete W/ Cont if Necessary Per Protocol    Interpretation Summary    Left ventricular ejection fraction appears to be 66 - 70%.    Left ventricular wall thickness is consistent with mild concentric hypertrophy.    Left ventricular diastolic function is consistent with (grade Ia w/high LAP) impaired relaxation.    Left atrial volume is moderately increased.    Mild pulmonary hypertension is present.       Review of Systems   All pertinent negatives and positives are as above. All other systems have been reviewed and are negative unless otherwise stated.     Objective    Temp:  [98.4 °F (36.9 °C)-100 °F (37.8 °C)] 98.7 °F (37.1 °C)  Heart Rate:  [] 94  Resp:  [18-20] 18  BP: (120-156)/(46-71) 145/59  Physical Exam  Vitals reviewed.   Constitutional:       Appearance: She is not toxic-appearing.   HENT:      Head: Normocephalic.      " Mouth/Throat:      Mouth: Mucous membranes are moist.   Pulmonary:      Effort: Pulmonary effort is normal. No respiratory distress.   Abdominal:      Palpations: Abdomen is soft.      Tenderness: There is no abdominal tenderness.   Musculoskeletal:         General: No swelling.   Skin:     General: Skin is warm.   Neurological:      Mental Status: She is alert.      Motor: Weakness present.   Psychiatric:         Mood and Affect: Mood normal.         Results Review:  I have reviewed the labs, radiology results, and diagnostic studies.    Laboratory Data:   Results from last 7 days   Lab Units 06/11/25  0546 06/10/25  0309 06/09/25  0501   WBC 10*3/mm3 6.47 8.11 5.86   HEMOGLOBIN g/dL 7.1* 7.8* 7.0*   HEMATOCRIT % 22.4* 24.9* 23.5*   PLATELETS 10*3/mm3 137* 176 165        Results from last 7 days   Lab Units 06/11/25  1253 06/11/25  0546 06/10/25  0309 06/09/25  0501 06/08/25  0342 06/07/25  1255   SODIUM mmol/L  --  141 139 139   < > 138   POTASSIUM mmol/L 3.6 3.5 4.1 4.5   < > 4.6   CHLORIDE mmol/L  --  108* 109* 114*   < > 106   CO2 mmol/L  --  23.0 19.0* 19.0*   < > 21.0*   BUN mg/dL  --  34.2* 41.3* 47.3*   < > 48.7*   CREATININE mg/dL  --  2.15* 2.77* 2.75*   < > 2.77*   CALCIUM mg/dL  --  8.4* 8.7 8.5*   < > 9.3   BILIRUBIN mg/dL  --   --   --  <0.2  --  <0.2   ALK PHOS U/L  --   --   --  48  --  60   ALT (SGPT) U/L  --   --   --  <5  --  6   AST (SGOT) U/L  --   --   --  6  --  12   GLUCOSE mg/dL  --  110* 116* 100*   < > 103*    < > = values in this interval not displayed.       Culture Data:     Microbiology Results (last 10 days)       Procedure Component Value - Date/Time    Blood Culture With YOLA - Blood, Arm, Right [[id number]]  (Normal) Collected: 06/08/25 1043    Lab Status: Preliminary result Specimen: Blood from Arm, Right Updated: 06/11/25 1130     Blood Culture No growth at 3 days    Urine Culture - Urine, Urine, Catheter [[id number]]  (Abnormal)  (Susceptibility) Collected: 06/07/25 1148    Lab  Status: Final result Specimen: Urine, Catheter Updated: 06/09/25 0938     Urine Culture >100,000 CFU/mL Escherichia coli    Narrative:      Colonization of the urinary tract without infection is common. Treatment is discouraged unless the patient is symptomatic, pregnant, or undergoing an invasive urologic procedure.    Susceptibility        Escherichia coli      CHULA      Amoxicillin + Clavulanate Resistant      Ampicillin Resistant      Ampicillin + Sulbactam Intermediate      Cefazolin (Urine) Susceptible      Cefepime Susceptible      Ceftazidime Susceptible      Ceftriaxone Susceptible      Cefuroxime axetil Intermediate      Gentamicin Susceptible      Levofloxacin Susceptible      Nitrofurantoin Susceptible      Piperacillin + Tazobactam Susceptible      Trimethoprim + Sulfamethoxazole Susceptible                                    Radiology Data:   Imaging Results (Last 7 Days)       Procedure Component Value Units Date/Time    CT Abdomen Pelvis Without Contrast [[id number]] Collected: 06/10/25 1505     Updated: 06/10/25 1519    Narrative:      EXAM/TECHNIQUE: CT abdomen/pelvis without contrast     INDICATION: right hydronephrosis on ultrasound; R62.7-Adult failure to  thrive; R41.82-Altered mental status, unspecified; N17.9-Acute kidney  failure, unspecified; N18.9-Chronic kidney disease, unspecified;  N39.0-Urinary tract infection, site not specified; D64.9-Anemia,  unspecified; Z75.8-Other problems related to medical facilities and  other health care; Z74.09-Other reduced mobility     COMPARISON: 11/12/2024     DLP: 742.81 mGy.cm. Automated exposure control was utilized to decrease  patient radiation dose.     FINDINGS:     LOWER CHEST: No acute findings.     LIVER AND BILIARY: No suspicious liver lesion. The gallbladder is  surgically absent. No biliary dilatation.     PANCREAS: Unremarkable.      SPLEEN: Unremarkable.      ADRENAL: Unremarkable.     KIDNEYS/BLADDER: No large renal lesion on this  noncontrast exam.  Moderate right and mild left hydronephrosis extending to the urinary  bladder. No ureteral stone. Diffuse urinary bladder wall thickening.     BOWEL: Large volume stool in the rectum with rectal wall thickening and  perirectal fat stranding. No colonic wall thickening or pericolonic fat  stranding. Normal appendix. No small bowel distention or inflammatory  change.      PERITONEUM: No ascites.      PELVIC ORGANS: Prior hysterectomy.     VASCULATURE: Atherosclerotic nonaneurysmal abdominal aorta.     LYMPH NODES: No enlarged lymph nodes.      SOFT TISSUES: Diffuse body wall soft tissue edema.     BONES: Multilevel lumbar spine degenerative change. Severe central canal  stenosis at L4-L5. Degenerative grade 1 anterolisthesis of L4 on L5.       Impression:         1.  Large volume stool in the rectum with rectal wall thickening and  perirectal fat stranding. Findings are in keeping with stercoral  colitis.     2.  Moderate right and mild left hydronephrosis extending to the urinary  bladder, without evidence of obstructing stone or mass. I suspect  hydronephrosis is related to compression by the distended stool-filled  rectum.     3.  Diffuse urinary bladder wall thickening, which can be seen in the  setting of cystitis.           This report was signed and finalized on 6/10/2025 3:16 PM by Dr. Bakari Ramos MD.       US Renal Bilateral [[id number]] Collected: 06/09/25 1644     Updated: 06/09/25 1649    Narrative:      EXAMINATION: US RENAL BILATERAL-     HISTORY: Acute kidney injury; R62.7-Adult failure to thrive;  R41.82-Altered mental status, unspecified; N17.9-Acute kidney failure,  unspecified; N18.9-Chronic kidney disease, unspecified; N39.0-Urinary  tract infection, site not specified; D64.9-Anemia, unspecified;  Z75.8-Other problems related to medical facilities and other health care     Images are stored in PACS per institutional protocol.     Grayscale and color-flow renal ultrasound.      COMPARISON:  12/18/2024 ultrasound.  11/12/2024 CT.     Normal size and position of both kidneys.  Mildly echogenic renal parenchyma.     Moderate RIGHT hydronephrosis.  No shadowing stone.     The right kidney measures 89 x 48 x 46 mm.     The left kidney measures 100 x 46 x 48 mm.       Impression:      Impression:  1. Moderate RIGHT hydronephrosis.           This report was signed and finalized on 6/9/2025 4:46 PM by Dr. Adam Monroy MD.       XR Chest 1 View [[id number]] Collected: 06/07/25 1205     Updated: 06/07/25 1210    Narrative:      XR CHEST 1 VW-     HISTORY: fall     COMPARISON: 12/18/2024     FINDINGS: Frontal view of the chest obtained.     Low lung volumes. Mild elevation right hemidiaphragm. Right basilar  densities favoring atelectasis. Cardiomediastinal contour is normal.  Calcified left hilar lymph nodes. No pleural effusion or pneumothorax.  No acute regional bony pathology. Right upper quadrant surgical clips.       Impression:      1. Low lung volumes with right basilar atelectasis and mild elevation  right hemidiaphragm. No pleural effusion or pneumothorax.        This report was signed and finalized on 6/7/2025 12:07 PM by Dr. Blanka Pak MD.       CT Head Without Contrast [[id number]] Collected: 06/07/25 1119     Updated: 06/07/25 1124    Narrative:      CT HEAD WO CONTRAST-      HISTORY: ams      COMPARISON: 12/18/2024     TOTAL DOSE LENGTH PRODUCT: 736.6 mGy.cm. Automated exposure control was  also utilized to decrease patient radiation dose.     TECHNIQUE: Axial images of the brain are obtained without contrast     FINDINGS: Similar moderate generalized volume loss with stable appearing  bilateral periventricular and white matter hypodensities. No  intracranial hemorrhage or mass effect. No convincing acute signs of  ischemia. No extra-axial hematoma or subarachnoid hemorrhage.     Chronic opacification right maxillary sinus. Mastoid air cells  well-aerated. Bony calvarium  appears intact.       Impression:         1. No acute intracranial abnormality identified. Similar moderate  generalized volume loss with  chronic small vessel ischemic change.  Chronic opacified right maxillary sinus.     This report was signed and finalized on 6/7/2025 11:21 AM by Dr. Blanka Pak MD.                I have reviewed the patient's current medications.     amLODIPine, 5 mg, Oral, Q24H  atorvastatin, 40 mg, Oral, Daily  bisacodyl, 10 mg, Rectal, Daily  carvedilol, 3.125 mg, Oral, BID With Meals  cefTRIAXone, 1,000 mg, Intravenous, Q24H  donepezil, 10 mg, Oral, Nightly  gabapentin, 300 mg, Oral, Q12H  levothyroxine, 50 mcg, Oral, QAM  pantoprazole, 40 mg, Oral, Q AM  potassium chloride, 20 mEq, Oral, Once  sertraline, 25 mg, Oral, Daily  sodium chloride, 10 mL, Intravenous, Q12H            Assessment/Plan   Assessment  Active Hospital Problems    Diagnosis     **Altered mental status     Generalized weakness     TOMÁS (acute kidney injury)     Hydronephrosis     Dementia     Acute UTI (urinary tract infection)     Essential hypertension     Anemia     Constipation        Treatment Plan  1.   Nephrology and urology recommendations reviewed  2.   Plans for repeat renal ultrasound  3.   Smith catheter in place  4.   Repeat basic metabolic panel in a.m. tomorrow  5.   IV ceftriaxone-day 5; urine culture is positive for E. coli susceptible to ceftriaxone  6.   Plan to restart outpatient Coreg and amlodipine; monitor blood pressure trend  7.   Bowel regimen  8.   Plan to check KUB  9.   PT and OT  10.   Disposition: Based upon the conversation that I had with social work/case management today it is the patient/family intent for her to return home with home health services.  [facility] did offer a bed pending precertification, however family has made the decision for her to return home with home health services.      Medical Decision Making     3 problems, acute, moderate complexity,  unchanged  2 Problems, chronic, moderate complexity, unchanged     Number and Complexity of problems: 5  Differential Diagnosis: None     Conditions and Status        Condition is improving     [facility] Data  External documents reviewed: None  Cardiac tracing (EKG, telemetry) interpretation: no new EKGs  Radiology interpretation: Reviewed  Labs reviewed: Yes  Any tests that were considered but not ordered: None     Decision rules/scores evaluated (example [id number], Wells, etc): None     Discussed with: Patient     Care Planning  Shared decision making: Patient  Code status and discussions: No CPR  Surrogate Decision Maker spouse, Adams Gomes     Disposition  Social Determinants of Health that impact treatment or disposition: None  I expect the patient to be discharged to home with  services in 1-2 days      Electronically signed by Abdirahman Schroeder MD-BC, 06/11/25, 13:31 CDT.

## 2025-06-11 NOTE — PROGRESS NOTES
Urology  Length of Stay: 4  Patient Care Team:  Katy Leone DO as PCP - General (Family Medicine)  [facility]  Eleazar Ramires MD as Cardiologist (Cardiology)  Shasta Madrigal MD as Referring Physician (Obstetrics and Gynecology)  Holly Chavez MD as Consulting Physician (General Surgery)    Chief Complaint: Hydronephrosis associated with urinary retention; urosepsis    Subjective     Interval History:   Patient seems more clear this morning.  Smith catheter is in place.  She does not remember having it placed.  CT scan yesterday did not show any signs of ureteral obstruction and in fact showed moderate right hydronephrosis and mild left hydronephrosis.  This is consistent with prior findings when her Smith was out.  Creatinine down to 2.15 with Smith in place.    Review of Systems:   Review of Systems    Objective       Intake/Output Summary (Last 24 hours) at 6/11/2025 0905  Last data filed at 6/11/2025 0806  Gross per 24 hour   Intake 480 ml   Output 850 ml   Net -370 ml       Vital Signs  Temp:  [98.4 °F (36.9 °C)-100 °F (37.8 °C)] 98.7 °F (37.1 °C)  Heart Rate:  [] 92  Resp:  [16-20] 18  BP: (120-156)/(46-71) 147/57    Physical Exam:  Physical Exam  Constitutional:       General: She is not in acute distress.     Appearance: She is not toxic-appearing.   HENT:      Head: Normocephalic and atraumatic.   Pulmonary:      Effort: Pulmonary effort is normal.   Genitourinary:     Comments: Smith with clear yellow urine  Neurological:      Mental Status: She is alert.          Results Review:       I reviewed the patient's new clinical results.  Lab Results (last 24 hours)       Procedure Component Value Units Date/Time    Basic Metabolic Panel [[id number]]  (Abnormal) Collected: 06/11/25 0546    Specimen: Blood Updated: 06/11/25 0627     Glucose 110 mg/dL      BUN 34.2 mg/dL      Creatinine 2.15 mg/dL      Sodium 141 mmol/L      Potassium 3.5 mmol/L      Chloride 108 mmol/L      CO2 23.0  mmol/L      Calcium 8.4 mg/dL      BUN/Creatinine Ratio 15.9     Anion Gap 10.0 mmol/L      eGFR 22.5 mL/min/1.73     Narrative:      GFR Categories in Chronic Kidney Disease (CKD)              GFR Category          GFR (mL/min/1.73)    Interpretation  G1                    90 or greater        Normal or high (1)  G2                    60-89                Mild decrease (1)  G3a                   45-59                Mild to moderate decrease  G3b                   30-44                Moderate to severe decrease  G4                    15-29                Severe decrease  G5                    14 or less           Kidney failure    (1)In the absence of evidence of kidney disease, neither GFR category G1 or G2 fulfill the criteria for CKD.    eGFR calculation 2021 CKD-EPI creatinine equation, which does not include race as a factor    CBC (No Diff) [[id number]]  (Abnormal) Collected: 06/11/25 0546    Specimen: Blood Updated: 06/11/25 0558     WBC 6.47 10*3/mm3      RBC 2.49 10*6/mm3      Hemoglobin 7.1 g/dL      Hematocrit 22.4 %      MCV 90.0 fL      MCH 28.5 pg      MCHC 31.7 g/dL      RDW 13.2 %      RDW-SD 43.7 fl      MPV 10.1 fL      Platelets 137 10*3/mm3     Vitamin D,25-Hydroxy [[id number]]  (Normal) Collected: 06/10/25 0313    Specimen: Blood Updated: 06/10/25 1212     25 Hydroxy, Vitamin D 38.0 ng/ml     Narrative:      Reference Range for Total Vitamin D 25(OH)     Deficiency <20.0 ng/mL   Insufficiency 21-29 ng/mL   Sufficiency  ng/mL  Toxicity >100 ng/ml      Blood Culture With YOLA - Blood, Arm, Right [[id number]]  (Normal) Collected: 06/08/25 1043    Specimen: Blood from Arm, Right Updated: 06/10/25 1130     Blood Culture No growth at 2 days          Imaging Results (Last 24 Hours)       Procedure Component Value Units Date/Time    CT Abdomen Pelvis Without Contrast [[id number]] Collected: 06/10/25 1505     Updated: 06/10/25 1519    Narrative:      EXAM/TECHNIQUE: CT abdomen/pelvis without  contrast     INDICATION: right hydronephrosis on ultrasound; R62.7-Adult failure to  thrive; R41.82-Altered mental status, unspecified; N17.9-Acute kidney  failure, unspecified; N18.9-Chronic kidney disease, unspecified;  N39.0-Urinary tract infection, site not specified; D64.9-Anemia,  unspecified; Z75.8-Other problems related to medical facilities and  other health care; Z74.09-Other reduced mobility     COMPARISON: 11/12/2024     DLP: 742.81 mGy.cm. Automated exposure control was utilized to decrease  patient radiation dose.     FINDINGS:     LOWER CHEST: No acute findings.     LIVER AND BILIARY: No suspicious liver lesion. The gallbladder is  surgically absent. No biliary dilatation.     PANCREAS: Unremarkable.      SPLEEN: Unremarkable.      ADRENAL: Unremarkable.     KIDNEYS/BLADDER: No large renal lesion on this noncontrast exam.  Moderate right and mild left hydronephrosis extending to the urinary  bladder. No ureteral stone. Diffuse urinary bladder wall thickening.     BOWEL: Large volume stool in the rectum with rectal wall thickening and  perirectal fat stranding. No colonic wall thickening or pericolonic fat  stranding. Normal appendix. No small bowel distention or inflammatory  change.      PERITONEUM: No ascites.      PELVIC ORGANS: Prior hysterectomy.     VASCULATURE: Atherosclerotic nonaneurysmal abdominal aorta.     LYMPH NODES: No enlarged lymph nodes.      SOFT TISSUES: Diffuse body wall soft tissue edema.     BONES: Multilevel lumbar spine degenerative change. Severe central canal  stenosis at L4-L5. Degenerative grade 1 anterolisthesis of L4 on L5.       Impression:         1.  Large volume stool in the rectum with rectal wall thickening and  perirectal fat stranding. Findings are in keeping with stercoral  colitis.     2.  Moderate right and mild left hydronephrosis extending to the urinary  bladder, without evidence of obstructing stone or mass. I suspect  hydronephrosis is related to  compression by the distended stool-filled  rectum.     3.  Diffuse urinary bladder wall thickening, which can be seen in the  setting of cystitis.           This report was signed and finalized on 6/10/2025 3:16 PM by Dr. Bakari Ramos MD.               Medication Review:     Current Facility-Administered Medications:     acetaminophen (TYLENOL) tablet 650 mg, 650 mg, Oral, Q4H PRN, 650 mg at 06/10/25 2006 **OR** acetaminophen (TYLENOL) 160 MG/5ML oral solution 650 mg, 650 mg, Oral, Q4H PRN **OR** acetaminophen (TYLENOL) suppository 650 mg, 650 mg, Rectal, Q4H PRN, Holli Menendez APRJOCELYNN    [Held by provider] amLODIPine (NORVASC) tablet 5 mg, 5 mg, Oral, Q24H, Holli Meenndez, APRN    atorvastatin (LIPITOR) tablet 40 mg, 40 mg, Oral, Daily, Holli Menendez, APRN, 40 mg at 06/11/25 0813    sennosides-docusate (PERICOLACE) 8.6-50 MG per tablet 2 tablet, 2 tablet, Oral, BID PRN **AND** polyethylene glycol (MIRALAX) packet 17 g, 17 g, Oral, Daily PRN **AND** bisacodyl (DULCOLAX) EC tablet 5 mg, 5 mg, Oral, Daily PRN **AND** [DISCONTINUED] bisacodyl (DULCOLAX) suppository 10 mg, 10 mg, Rectal, Daily PRN, Holli Menendez, APRN    bisacodyl (DULCOLAX) suppository 10 mg, 10 mg, Rectal, Daily, Abdirahman Schroeder MD, 10 mg at 06/11/25 0813    Calcium Replacement - Follow Nurse / BPA Driven Protocol, , Not Applicable, PRN, Holli Menendez APRN    [Held by provider] carvedilol (COREG) tablet 3.125 mg, 3.125 mg, Oral, BID With Meals, Holli Menendez, APRN    cefTRIAXone (ROCEPHIN) 1,000 mg in sodium chloride 0.9 % 100 mL MBP, 1,000 mg, Intravenous, Q24H, Holli Menendez APRN, Last Rate: 200 mL/hr at 06/10/25 1602, 1,000 mg at 06/10/25 1602    donepezil (ARICEPT) tablet 10 mg, 10 mg, Oral, Nightly, Holli Menendez APRN, 10 mg at 06/10/25 2006    gabapentin (NEURONTIN) capsule 300 mg, 300 mg, Oral, Q12H, Holli Menendez APRN, 300 mg at 06/11/25 0813    levothyroxine (SYNTHROID, LEVOTHROID) tablet 50 mcg, 50 mcg, Oral,  QAM, Holli Menendez APRN, 50 mcg at 06/11/25 0548    Magnesium Standard Dose Replacement - Follow Nurse / BPA Driven Protocol, , Not Applicable, PRN, Holli Menendez APRN    nitroglycerin (NITROSTAT) SL tablet 0.4 mg, 0.4 mg, Sublingual, Q5 Min PRN, Holli Menendez, APRN    ondansetron ODT (ZOFRAN-ODT) disintegrating tablet 4 mg, 4 mg, Oral, Q6H PRN **OR** ondansetron (ZOFRAN) injection 4 mg, 4 mg, Intravenous, Q6H PRN, Holli Menendez, APRN    oxyCODONE (ROXICODONE) immediate release tablet 15 mg, 15 mg, Oral, TID PRN, Holli Menendez, APRN, 15 mg at 06/10/25 0803    pantoprazole (PROTONIX) EC tablet 40 mg, 40 mg, Oral, Q AM, Holli Menendez APRN, 40 mg at 06/11/25 0548    Phosphorus Replacement - Follow Nurse / BPA Driven Protocol, , Not Applicable, PRN, Holli Menendez APRN    potassium chloride (KLOR-CON) packet 20 mEq, 20 mEq, Oral, Once, Abdirahman Schroeder MD    Potassium Replacement - Follow Nurse / BPA Driven Protocol, , Not Applicable, PRN, Holli Menendez, APRN    sertraline (ZOLOFT) tablet 25 mg, 25 mg, Oral, Daily, Holli Menendez APRN, 25 mg at 06/11/25 0813    sodium chloride 0.45 % 925 mL with sodium bicarbonate 8.4 % 75 mEq infusion, , Intravenous, Continuous, Basil Bermudez MD, Last Rate: 40 mL/hr at 06/11/25 0203, New Bag at 06/11/25 0203    sodium chloride 0.9 % flush 10 mL, 10 mL, Intravenous, Q12H, Holli Menendez, APRN, 10 mL at 06/11/25 0813    sodium chloride 0.9 % flush 10 mL, 10 mL, Intravenous, PRN, Holli Menendez, APRN    sodium chloride 0.9 % infusion 40 mL, 40 mL, Intravenous, PRN, Surya Menendezy L, APRN    Problem List:    Altered mental status    Anemia    Essential hypertension    Acute UTI (urinary tract infection)    Hydronephrosis    TOMÁS (acute kidney injury)     Assessment/Plan:   Bilateral hydronephrosis associated with incomplete bladder emptying.  Urosepsis    Plan: Patient needs long-term Smith catheter.  She can return to the office for catheter change  in 1 month.  If she would like to discuss suprapubic catheter placement rather than urethral catheter that can be set up through the office as well.  I have ordered a renal ultrasound for today to confirm resolution of hydronephrosis.  Presuming there is no further hydronephrosis and no need for further intervention she will be seen as an outpatient.    (Please note that portions of this note were completed with a voice recognition program.)  Rogers Cancino MD  06/11/25  09:05 CDT

## 2025-06-11 NOTE — PROGRESS NOTES
Nephrology ([facility]) Progress Note      Patient:  Jennifer Gomes  YOB: 1940  Date of Service: 6/11/2025  MRN: [id number]   Acct: [id number]   Primary Care Physician: Katy Leone DO  Advance Directive:   Code Status and Medical Interventions: No CPR (Do Not Attempt to Resuscitate); Limited Support; No intubation (DNI)   Ordered at: 06/07/25 1414     Code Status (Patient has no pulse and is not breathing):    No CPR (Do Not Attempt to Resuscitate)     Medical Interventions (Patient has pulse or is breathing):    Limited Support     Medical Intervention Limits:    No intubation (DNI)     Level Of Support Discussed With:    Health Care Surrogate     Admit Date: 6/7/2025       Hospital Day: 4  Referring Provider: No Known Provider      Patient personally seen and examined.  Complete chart including Consults, Notes, Operative Reports, Labs, Cardiology, and Radiology studies reviewed as able.        Subjective:  Jennifer Gomes is a 84 y.o. female for whom we were consulted for evaluation and treatment of acute kidney injury with a history of chronic kidney disease of unknown stage.  No prior nephrologic evaluations.  She also has a history of hypertension, GERD, fibromyalgia, depression, sleep apnea and dementia.  She presented to the emergency room with worsening mental status and was diagnosed with cystitis and acute kidney injury.  She was given IV antibiotics and fluids for treatment.    Today, no overnight events.  She denied current chest pain, shortness of air at rest, nausea or vomiting.  No family present today.  Smith catheter replaced yesterday.    Allergies:  Ropinirole hcl, Codeine, Definity [perflutren lipid microsphere], Ambien [zolpidem], Eszopiclone, Pregabalin, and Tizanidine    Home Meds:  Medications Prior to Admission   Medication Sig Dispense Refill Last Dose/Taking    amLODIPine (NORVASC) 5 MG tablet Take 1 tablet by mouth Daily.   Taking     atorvastatin (LIPITOR) 40 MG tablet Take 1 tablet by mouth Daily.   Taking    butalbital-acetaminophen-caffeine (FIORICET, ESGIC) -40 MG per tablet Take 1 tablet by mouth Every 12 (Twelve) Hours.   Taking    carvedilol (COREG) 3.125 MG tablet Take 1 tablet by mouth 2 (Two) Times a Day With Meals.   Taking    cyclobenzaprine (FLEXERIL) 10 MG tablet Take 1 tablet by mouth Every 12 (Twelve) Hours.   Taking    donepezil (ARICEPT) 10 MG tablet Take 1 tablet by mouth Every Night.   Taking    ergocalciferol (ERGOCALCIFEROL) 84683 units capsule Take 1 capsule by mouth 1 (One) Time Per Week. Saturday   Taking    gabapentin (NEURONTIN) 300 MG capsule Take 1 capsule by mouth 2 (Two) Times a Day.   Taking    lansoprazole (PREVACID) 30 MG capsule Take 1 capsule by mouth Daily.   Taking    levothyroxine (SYNTHROID, LEVOTHROID) 50 MCG tablet Take 1 tablet by mouth Daily.   Taking    oxyCODONE (ROXICODONE) 15 MG immediate release tablet Take 1 tablet by mouth 3 times a day.   Taking    sertraline (ZOLOFT) 25 MG tablet Take 1 tablet by mouth Daily. 90 tablet 0 Taking    acetaminophen (TYLENOL) 325 MG tablet Take 2 tablets by mouth Every 6 (Six) Hours As Needed for Mild Pain.          Medicines:  Current Facility-Administered Medications   Medication Dose Route Frequency Provider Last Rate Last Admin    acetaminophen (TYLENOL) tablet 650 mg  650 mg Oral Q4H PRN Holli Menendez APRN   650 mg at 06/10/25 2006    Or    acetaminophen (TYLENOL) 160 MG/5ML oral solution 650 mg  650 mg Oral Q4H PRN Holli Menendez APRN        Or    acetaminophen (TYLENOL) suppository 650 mg  650 mg Rectal Q4H PRN Holli Menendez APRN        [Held by provider] amLODIPine (NORVASC) tablet 5 mg  5 mg Oral Q24H Holli Menendez APRN        atorvastatin (LIPITOR) tablet 40 mg  40 mg Oral Daily Holli Menendez APRN   40 mg at 06/11/25 0813    sennosides-docusate (PERICOLACE) 8.6-50 MG per tablet 2 tablet  2 tablet Oral BID PRN Holli Menendez APRN         And    polyethylene glycol (MIRALAX) packet 17 g  17 g Oral Daily PRN Holli Menendez APRN        And    bisacodyl (DULCOLAX) EC tablet 5 mg  5 mg Oral Daily PRN Holli Menendez APRN        bisacodyl (DULCOLAX) suppository 10 mg  10 mg Rectal Daily Abdirahman Schroeder MD   10 mg at 06/11/25 0813    Calcium Replacement - Follow Nurse / BPA Driven Protocol   Not Applicable PRN Holli Menendez APRN        [Held by provider] carvedilol (COREG) tablet 3.125 mg  3.125 mg Oral BID With Meals Holli Menendez APRN        cefTRIAXone (ROCEPHIN) 1,000 mg in sodium chloride 0.9 % 100 mL MBP  1,000 mg Intravenous Q24H Holli Menendez APRN 200 mL/hr at 06/10/25 1602 1,000 mg at 06/10/25 1602    donepezil (ARICEPT) tablet 10 mg  10 mg Oral Nightly Holli Menendez APRN   10 mg at 06/10/25 2006    gabapentin (NEURONTIN) capsule 300 mg  300 mg Oral Q12H Holli Menendez APRN   300 mg at 06/11/25 0813    levothyroxine (SYNTHROID, LEVOTHROID) tablet 50 mcg  50 mcg Oral QAM Holli Menendez APRN   50 mcg at 06/11/25 0548    Magnesium Standard Dose Replacement - Follow Nurse / BPA Driven Protocol   Not Applicable PRN Holli Menendez APRN        nitroglycerin (NITROSTAT) SL tablet 0.4 mg  0.4 mg Sublingual Q5 Min PRN Holli Menendez APRN        ondansetron ODT (ZOFRAN-ODT) disintegrating tablet 4 mg  4 mg Oral Q6H PRN Holli Menendez APRN        Or    ondansetron (ZOFRAN) injection 4 mg  4 mg Intravenous Q6H PRN Holli Menendez APRN        oxyCODONE (ROXICODONE) immediate release tablet 15 mg  15 mg Oral TID PRN Holli Menendez APRN   15 mg at 06/11/25 1100    pantoprazole (PROTONIX) EC tablet 40 mg  40 mg Oral Q AM Holli Menendez APRN   40 mg at 06/11/25 0548    Phosphorus Replacement - Follow Nurse / BPA Driven Protocol   Not Applicable PRN Holli Menendez APRN        potassium chloride (KLOR-CON) packet 20 mEq  20 mEq Oral Once Abdirahman Schroeder MD        Potassium Replacement - Follow Nurse / BPA Driven Protocol    Not Applicable PRN Holli Menendez APRJOCELYNN        sertraline (ZOLOFT) tablet 25 mg  25 mg Oral Daily Holli Menendez APRN   25 mg at 25 0813    sodium chloride 0.45 % 925 mL with sodium bicarbonate 8.4 % 75 mEq infusion   Intravenous Continuous Basil Bermudez MD 40 mL/hr at 25 0203 New Bag at 25 0203    sodium chloride 0.9 % flush 10 mL  10 mL Intravenous Q12H Holli Menendez APRN   10 mL at 25 0813    sodium chloride 0.9 % flush 10 mL  10 mL Intravenous PRN Holli Menendez, APRN        sodium chloride 0.9 % infusion 40 mL  40 mL Intravenous PRN Holli Menendez APRN           Past Medical History:  Past Medical History:   Diagnosis Date    Anxiety     Arthritis     Bronchitis     Cancer     uterine    Chronic pain     Depression     Disease of thyroid gland     Fibromyalgia     GERD (gastroesophageal reflux disease)     Headache     History of transfusion     AS     Hyperlipidemia     Hypertension     Incontinence     Insomnia     Leg pain     Lumbar stenosis     Migraines     Peptic ulcer     Restless legs     Sleep apnea     NO C-PAP    UTI (urinary tract infection)     Vaginal bleeding        Past Surgical History:  Past Surgical History:   Procedure Laterality Date    BLADDER REPAIR      MESH HAD TO BE REMOVED IN 2013    BREAST BIOPSY Right 2017    benign    BREAST CYST EXCISION Left     CARDIAC CATHETERIZATION      CARPAL TUNNEL RELEASE      CATARACT EXTRACTION W/ INTRAOCULAR LENS  IMPLANT, BILATERAL      CHOLECYSTECTOMY WITH INTRAOPERATIVE CHOLANGIOGRAM N/A 2023    Procedure: CHOLECYSTECTOMY LAPAROSCOPIC INTRAOPERATIVE CHOLANGIOGRAM;  Surgeon: Gerardo Arciniega MD;  Location: [facility] OR;  Service: General;  Laterality: N/A;    COLONOSCOPY      COLONOSCOPY N/A 10/01/2021    Procedure: COLONOSCOPY WITH ANESTHESIA;  Surgeon: Tom Velasco DO;  Location: [facility] ENDOSCOPY;  Service: Gastroenterology;  Laterality: N/A;  pre: change in bowel habits  post:  diverticulosis. hemorrhoids.   Olivia Mora, MORGAN        CYSTECTOMY      D & C HYSTEROSCOPY N/A 11/06/2017    Procedure: DILATATION AND CURETTAGE HYSTEROSCOPY;  Surgeon: Shasta Madrigal MD;  Location: [facility] OR;  Service:     DILATION AND CURETTAGE, DIAGNOSTIC / THERAPEUTIC  2008    ENDOSCOPY  09/23/2010    Short segment of Arriola's,Moderate chroninc esophagogastritis and negative H.pylori    ENDOSCOPY N/A 09/25/2017    Procedure: ESOPHAGOGASTRODUODENOSCOPY WITH ANESTHESIA;  Surgeon: Tom Velasco DO;  Location: [facility] ENDOSCOPY;  Service:     EYE SURGERY      RETINA    HEMORRHOIDECTOMY SIGMOIDOSCOPY N/A 3/21/2023    Procedure: HEMORRHOIDECTOMY WITH EXAM UNDER ANESTHESIA;  Surgeon: Holly Chavez MD;  Location: [facility] OR;  Service: General;  Laterality: N/A;    HYSTERECTOMY  12/20/2017    ORIF TIBIA/FIBULA FRACTURES Left 2000    TRANSVAGINAL TAPING SUSPENSION N/A 11/06/2017    Procedure: VAGINAL MESH REVISION;  Surgeon: Shasta Madrigal MD;  Location: [facility] OR;  Service:     VAGINAL MESH REVISION  2013       Family History  Family History   Problem Relation Age of Onset    Diabetes Mother     Multiple myeloma Mother     Stroke Father     Diabetes Sister     Prostate cancer Brother     Lymphoma Brother         NHL    Ovarian cancer Paternal Aunt     Cancer Paternal Grandmother         metastatic    Lung cancer Paternal Grandfather     Colon cancer Neg Hx     Esophageal cancer Neg Hx     Breast cancer Neg Hx        Social History  Social History     Socioeconomic History    Marital status:    Tobacco Use    Smoking status: Never    Smokeless tobacco: Never   Vaping Use    Vaping status: Never Used   Substance and Sexual Activity    Alcohol use: Not Currently     Comment: occasional    Drug use: No    Sexual activity: Defer       Review of Systems:  History obtained from chart review and the patient  General ROS: No fever or chills  Respiratory ROS: No cough, shortness of breath,  wheezing  Cardiovascular ROS: No chest pain or palpitations  Gastrointestinal ROS: No abdominal pain or melena  Genito-Urinary ROS: No dysuria or hematuria  Psych ROS: No anxiety and depression  14 point ROS reviewed with the patient and negative except as noted above and in the HPI unless unable to obtain.    Objective:  Patient Vitals for the past 24 hrs:   BP Temp Temp src Pulse Resp SpO2   06/11/25 1150 145/59 98.7 °F (37.1 °C) Oral 94 18 100 %   06/11/25 0806 147/57 98.7 °F (37.1 °C) Oral 92 18 100 %   06/11/25 0445 136/71 98.4 °F (36.9 °C) Oral 89 18 100 %   06/11/25 0059 120/46 98.6 °F (37 °C) Oral 89 18 98 %   06/10/25 1958 131/64 98.5 °F (36.9 °C) Oral 109 18 98 %   06/10/25 1549 156/50 100 °F (37.8 °C) Oral 107 20 99 %       Intake/Output Summary (Last 24 hours) at 6/11/2025 1254  Last data filed at 6/11/2025 1150  Gross per 24 hour   Intake 480 ml   Output 1325 ml   Net -845 ml     General: awake/alert   Chest:  clear to auscultation bilaterally without respiratory distress  CVS: regular rate and rhythm  Abdominal: soft, nontender, positive bowel sounds  Extremities: no cyanosis or edema  Skin: warm and dry without rash      Labs:  Results from last 7 days   Lab Units 06/11/25  0546 06/10/25  0309 06/09/25  0501   WBC 10*3/mm3 6.47 8.11 5.86   HEMOGLOBIN g/dL 7.1* 7.8* 7.0*   HEMATOCRIT % 22.4* 24.9* 23.5*   PLATELETS 10*3/mm3 137* 176 165         Results from last 7 days   Lab Units 06/11/25  0546 06/10/25  0309 06/09/25  0501 06/08/25  0342 06/07/25  1255   SODIUM mmol/L 141 139 139   < > 138   POTASSIUM mmol/L 3.5 4.1 4.5   < > 4.6   CHLORIDE mmol/L 108* 109* 114*   < > 106   CO2 mmol/L 23.0 19.0* 19.0*   < > 21.0*   BUN mg/dL 34.2* 41.3* 47.3*   < > 48.7*   CREATININE mg/dL 2.15* 2.77* 2.75*   < > 2.77*   CALCIUM mg/dL 8.4* 8.7 8.5*   < > 9.3   EGFR mL/min/1.73 22.5* 16.6* 16.7*   < > 16.6*   BILIRUBIN mg/dL  --   --  <0.2  --  <0.2   ALK PHOS U/L  --   --  48  --  60   ALT (SGPT) U/L  --   --  <5  --   6   AST (SGOT) U/L  --   --  6  --  12   GLUCOSE mg/dL 110* 116* 100*   < > 103*    < > = values in this interval not displayed.       Radiology:   Imaging Results (Last 72 Hours)       Procedure Component Value Units Date/Time    CT Abdomen Pelvis Without Contrast [[id number]] Collected: 06/10/25 1505     Updated: 06/10/25 1519    Narrative:      EXAM/TECHNIQUE: CT abdomen/pelvis without contrast     INDICATION: right hydronephrosis on ultrasound; R62.7-Adult failure to  thrive; R41.82-Altered mental status, unspecified; N17.9-Acute kidney  failure, unspecified; N18.9-Chronic kidney disease, unspecified;  N39.0-Urinary tract infection, site not specified; D64.9-Anemia,  unspecified; Z75.8-Other problems related to medical facilities and  other health care; Z74.09-Other reduced mobility     COMPARISON: 11/12/2024     DLP: 742.81 mGy.cm. Automated exposure control was utilized to decrease  patient radiation dose.     FINDINGS:     LOWER CHEST: No acute findings.     LIVER AND BILIARY: No suspicious liver lesion. The gallbladder is  surgically absent. No biliary dilatation.     PANCREAS: Unremarkable.      SPLEEN: Unremarkable.      ADRENAL: Unremarkable.     KIDNEYS/BLADDER: No large renal lesion on this noncontrast exam.  Moderate right and mild left hydronephrosis extending to the urinary  bladder. No ureteral stone. Diffuse urinary bladder wall thickening.     BOWEL: Large volume stool in the rectum with rectal wall thickening and  perirectal fat stranding. No colonic wall thickening or pericolonic fat  stranding. Normal appendix. No small bowel distention or inflammatory  change.      PERITONEUM: No ascites.      PELVIC ORGANS: Prior hysterectomy.     VASCULATURE: Atherosclerotic nonaneurysmal abdominal aorta.     LYMPH NODES: No enlarged lymph nodes.      SOFT TISSUES: Diffuse body wall soft tissue edema.     BONES: Multilevel lumbar spine degenerative change. Severe central canal  stenosis at L4-L5.  Degenerative grade 1 anterolisthesis of L4 on L5.       Impression:         1.  Large volume stool in the rectum with rectal wall thickening and  perirectal fat stranding. Findings are in keeping with stercoral  colitis.     2.  Moderate right and mild left hydronephrosis extending to the urinary  bladder, without evidence of obstructing stone or mass. I suspect  hydronephrosis is related to compression by the distended stool-filled  rectum.     3.  Diffuse urinary bladder wall thickening, which can be seen in the  setting of cystitis.           This report was signed and finalized on 6/10/2025 3:16 PM by Dr. Bakari Ramos MD.       US Renal Bilateral [[id number]] Collected: 06/09/25 1644     Updated: 06/09/25 1649    Narrative:      EXAMINATION: US RENAL BILATERAL-     HISTORY: Acute kidney injury; R62.7-Adult failure to thrive;  R41.82-Altered mental status, unspecified; N17.9-Acute kidney failure,  unspecified; N18.9-Chronic kidney disease, unspecified; N39.0-Urinary  tract infection, site not specified; D64.9-Anemia, unspecified;  Z75.8-Other problems related to medical facilities and other health care     Images are stored in PACS per institutional protocol.     Grayscale and color-flow renal ultrasound.     COMPARISON:  12/18/2024 ultrasound.  11/12/2024 CT.     Normal size and position of both kidneys.  Mildly echogenic renal parenchyma.     Moderate RIGHT hydronephrosis.  No shadowing stone.     The right kidney measures 89 x 48 x 46 mm.     The left kidney measures 100 x 46 x 48 mm.       Impression:      Impression:  1. Moderate RIGHT hydronephrosis.           This report was signed and finalized on 6/9/2025 4:46 PM by Dr. Adam Monroy MD.               Culture:  Blood Culture   Date Value Ref Range Status   06/08/2025 No growth at 24 hours  Preliminary     Urine Culture   Date Value Ref Range Status   06/07/2025 >100,000 CFU/mL Escherichia coli (A)  Final         Assessment   Acute kidney  injury-obstructive  Acute cystitis  Metabolic acidosis  Anemia with iron deficiency  Dementia  Chronic kidney disease-unknown stage  Hypertension with recent hypotension    Plan:  Discussed with patient, nursing  Workup reviewed today  Monitor labs  Fluids adjustments as per orders, will plan to wean over the next 24 hours if continued improvement with Smith catheter and if patient able to maintain adequate dietary intake  Renal function improving with replaced Smith catheter  Urology evaluation reviewed as current  Defer further neurologic workup to primary service  Transfuse packed red blood cells as needed for hemoglobin less than 7      Basil Bermudez MD  6/11/2025  12:54 CDT

## 2025-06-11 NOTE — PLAN OF CARE
Goal Outcome Evaluation:  Plan of Care Reviewed With: patient        Progress: no change  Outcome Evaluation: pt agreed to therapy, trans to EOB cga, pt leaning to R due to pain BLE, pt performed BLE AP, LAQ x 10 reps, requested to pay down due to BLE pain, trans back to bed cga, pt would benefit from SNF

## 2025-06-11 NOTE — CASE MANAGEMENT/SOCIAL WORK
Continued Stay Note  [facility]     Patient Name: Jennifer Gomes  MRN: [id number]  Today's Date: 6/11/2025    Admit Date: 6/7/2025    Plan: Referral to [facility]   Discharge Plan       Row Name 06/11/25 1019       Plan    Plan Comments SW notified pt dtr that [facility] did offer, pending precert. Dtr states they have decided for [place] to [place]. HH is being requested at [place]. SW will check with [facility] intake to see if they can accept.                   Discharge Codes    No documentation.                 Expected Discharge Date and Time       Expected Discharge Date Expected Discharge Time    Jun 11, 2025               MICHAEL Burt

## 2025-06-11 NOTE — TELEPHONE ENCOUNTER
Caller: [facility] (FRIDA)    Relationship: Other    Best call back number: [phone]     What is the best time to reach you: ANY    Who are you requesting to speak with (clinical staff, provider,  specific staff member): CLINICAL    Do you know the name of the person who called: FRIDA    What was the call regarding: D/C TODAY AT . NEEDS DR. MARTIN TO SIGN ORDERS FOR HOME HEALTH CARE FOR OT AND PT. PLEASE CALL BACK TO ADVISE.

## 2025-06-11 NOTE — PLAN OF CARE
Goal Outcome Evaluation:  Plan of Care Reviewed With: patient        Progress: no change  Outcome Evaluation: Pt disoriented to place and situation. Crushed Pt meds put in apple sauce d/t Pt spitting out meds. C/o pain, Pt crying, PRN med given with good results. Smith care completed. IV fluids running. Loose BM this shift, bed alarm on, q2 turn, neuro checks, VSS, call light within reach.

## 2025-06-11 NOTE — PLAN OF CARE
Goal Outcome Evaluation:   A&Ox3 forgetful, on room air, resting in bed. Patient behavior was appropriate throughout shift- no agitation. Hourly rounding. Renal ultrasound done. New IV placed- patient pulled out wrist IV. VSS. IV fluids running. Multiple loose bowel movements today- suppository given in AM. Fall risk- bed alarm- safety maintained.

## 2025-06-11 NOTE — THERAPY TREATMENT NOTE
Acute Care - Physical Therapy Treatment Note  [facility]     Patient Name: Jennifer Gomes  : 1940  MRN: [id number]  Today's Date: 2025      Visit Dx:     ICD-10-CM ICD-9-CM   1. Failure to thrive in adult  R62.7 783.7   2. Altered mental status, unspecified altered mental status type  R41.82 780.97   3. Acute renal failure superimposed on chronic kidney disease, unspecified acute renal failure type, unspecified CKD stage  N17.9 584.9    N18.9 585.9   4. Acute UTI (urinary tract infection)  N39.0 599.0   5. Anemia, unspecified type  D64.9 285.9   6. Discharge planning issues  Z75.8 V49.89   7. Impaired mobility [Z74.09]  Z74.09 799.89     Patient Active Problem List   Diagnosis    Gastroesophageal reflux disease    Spinal stenosis, lumbar region, without neurogenic claudication    Erosion of vaginal mesh    Adenocarcinoma of endometrium    S/P hysterectomy with oophorectomy    Encounter for follow-up surveillance of endometrial cancer    History of radiation therapy    Non-traumatic rhabdomyolysis    Metabolic encephalopathy    Acute renal failure superimposed on stage 3 chronic kidney disease    Medical non-compliance, does not take narcotics as prescribed    Chronic constipation    Chronic pain syndrome    Chronic prescription opiate use    Anemia    Hypothyroidism (acquired)    Essential hypertension    Venous insufficiency of both lower extremities    Chronic intractable headache    RAFAL (obstructive sleep apnea)    Acute encephalopathy due to UTI    Toxic metabolic encephalopathy    Polypharmacy    Frequent falls    Grade III internal hemorrhoids    External hemorrhoids    Colitis    Moderate malnutrition    Transaminitis    Acute UTI (urinary tract infection)    Recurrent UTI (urinary tract infection)    Dementia    Hypokalemia    Sepsis due to urinary tract infection    Cardiopulmonary arrest    E coli bacteremia    Anemia requiring transfusions    Hydronephrosis    Acute urinary retention    TOMÁS  (acute kidney injury)    Hypercalcemia    Paroxysmal atrial fibrillation    Altered mental status     Past Medical History:   Diagnosis Date    Anxiety     Arthritis     Bronchitis     Cancer     uterine    Chronic pain     Depression     Disease of thyroid gland     Fibromyalgia     GERD (gastroesophageal reflux disease)     Headache     History of transfusion     AS     Hyperlipidemia     Hypertension     Incontinence     Insomnia     Leg pain     Lumbar stenosis     Migraines     Peptic ulcer     Restless legs     Sleep apnea     NO C-PAP    UTI (urinary tract infection)     Vaginal bleeding      Past Surgical History:   Procedure Laterality Date    BLADDER REPAIR      MESH HAD TO BE REMOVED IN 2013    BREAST BIOPSY Right 2017    benign    BREAST CYST EXCISION Left 1982    CARDIAC CATHETERIZATION      CARPAL TUNNEL RELEASE      CATARACT EXTRACTION W/ INTRAOCULAR LENS  IMPLANT, BILATERAL      CHOLECYSTECTOMY WITH INTRAOPERATIVE CHOLANGIOGRAM N/A 2023    Procedure: CHOLECYSTECTOMY LAPAROSCOPIC INTRAOPERATIVE CHOLANGIOGRAM;  Surgeon: Gerardo Arciniega MD;  Location: [facility] OR;  Service: General;  Laterality: N/A;    COLONOSCOPY      COLONOSCOPY N/A 10/01/2021    Procedure: COLONOSCOPY WITH ANESTHESIA;  Surgeon: Tom Velasco DO;  Location: [facility] ENDOSCOPY;  Service: Gastroenterology;  Laterality: N/A;  pre: change in bowel habits  post: diverticulosis. hemorrhoids.   Olivia Mora APRN        CYSTECTOMY      D & C HYSTEROSCOPY N/A 2017    Procedure: DILATATION AND CURETTAGE HYSTEROSCOPY;  Surgeon: Shasta Madrigal MD;  Location: [facility] OR;  Service:     DILATION AND CURETTAGE, DIAGNOSTIC / THERAPEUTIC      ENDOSCOPY  2010    Short segment of Arriola's,Moderate chroninc esophagogastritis and negative H.pylori    ENDOSCOPY N/A 2017    Procedure: ESOPHAGOGASTRODUODENOSCOPY WITH ANESTHESIA;  Surgeon: Tom Velasco DO;  Location: [facility] ENDOSCOPY;  Service:     EYE  SURGERY      RETINA    HEMORRHOIDECTOMY SIGMOIDOSCOPY N/A 3/21/2023    Procedure: HEMORRHOIDECTOMY WITH EXAM UNDER ANESTHESIA;  Surgeon: Holly Chavez MD;  Location:  [facility] OR;  Service: General;  Laterality: N/A;    HYSTERECTOMY  12/20/2017    ORIF TIBIA/FIBULA FRACTURES Left 2000    TRANSVAGINAL TAPING SUSPENSION N/A 11/06/2017    Procedure: VAGINAL MESH REVISION;  Surgeon: Shasta Madrigal MD;  Location:  [facility] OR;  Service:     VAGINAL MESH REVISION  2013     PT Assessment (Last 12 Hours)       PT Evaluation and Treatment       [facility] Name 06/11/25 1050          Physical Therapy Time and Intention    Subjective Information complains of;pain  -     Document Type therapy note (daily note)  -     Mode of Treatment physical therapy  -[facility] Name 06/11/25 1050          General Information    Existing Precautions/Restrictions fall  -[facility] Name 06/11/25 1050          Pain    Pretreatment Pain Rating 8/10  -     Posttreatment Pain Rating 8/10  -     Pain Location extremity  -     Pain Side/Orientation bilateral;lower  -     Pain Management Interventions nursing notified  -     Response to Pain Interventions no change per patient report  -[facility] Name 06/11/25 1050          Bed Mobility    Supine-Sit [place] (Bed Mobility) contact guard;verbal cues  -     Sit-Supine [place] (Bed Mobility) contact guard;verbal cues  -     Comment, (Bed Mobility) pt came to partially sitting on EOB,sat 10 minutes c/o BLE pain, requested to lay down  -[facility] Name 06/11/25 1050          Motor Skills    Comments, Therapeutic Exercise BLE AP, LAQ  x 10 reps  -     Additional Documentation Comments, Therapeutic Exercise (Row)  -[facility] Name             Wound 06/10/25 2000 Right lower breast Other (Comments)    Wound - Properties Group Placement Date: 06/10/25  -CG Placement Time: 2000  -CG Side: Right  -CG Orientation: lower  -CG Location: breast  -CG Primary Wound Type: Other  -CG,  excoriation     Retired Wound - Properties Group Placement Date: 06/10/25  -CG Placement Time: 2000 -CG Side: Right  -CG Orientation: lower  -CG Location: breast  -CG    Retired Wound - Properties Group Placement Date: 06/10/25  -CG Placement Time: 2000 -CG Side: Right  -CG Orientation: lower  -CG Location: breast  -CG    Retired Wound - Properties Group Date first assessed: 06/10/25  -CG Time first assessed: 2000 -CG Side: Right  -CG Location: breast  -CG      Row Name 06/11/25 1050          Plan of Care Review    Plan of Care Reviewed With patient  -AH     Progress no change  -AH     Outcome Evaluation pt agreed to therapy, trans to EOB cga, pt leaning to R due to pain BLE, pt performed BLE AP, LAQ x 10 reps, requested to pay down due to BLE pain, trans back to bed cga, pt would benefit from SNF  -       Row Name 06/11/25 1050          Positioning and Restraints    Pre-Treatment Position in bed  -     Post Treatment Position bed  -     In Bed side lying right;notified nsg;call light within reach;encouraged to call for assist;exit alarm on  -               User Key  (r) = Recorded By, (t) = Taken By, (c) = Cosigned By      Initials Name Provider Type     Candace Dc PTA Physical Therapist Assistant     Tamara Yap RN Registered Nurse                    Physical Therapy Education       Title: PT OT SLP Therapies (In Progress)       Topic: Physical Therapy (In Progress)       Point: Mobility training (In Progress)       Learning Progress Summary            Patient Nonacceptance, E, NR,NL by AR at 6/11/2025 0751    Acceptance, E, VU,NR by KAVITA at 6/9/2025 1414    Comment: role of PT, high fall risk, t/f, dc planning                      Point: Home exercise program (In Progress)       Learning Progress Summary            Patient Nonacceptance, E, NR,NL by AR at 6/11/2025 0751    Acceptance, E, VU,NR by KAVITA at 6/9/2025 1414    Comment: role of PT, high fall risk, t/f, dc planning                       Point: Body mechanics (In Progress)       Learning Progress Summary            Patient Nonacceptance, E, NR,NL by AR at 6/11/2025 0751    Acceptance, E, VU,NR by AJ at 6/9/2025 1414    Comment: role of PT, high fall risk, t/f, dc planning                      Point: Precautions (In Progress)       Learning Progress Summary            Patient Nonacceptance, E, NR,NL by AR at 6/11/2025 0751    Acceptance, E, VU,NR by AJ at 6/9/2025 1414    Comment: role of PT, high fall risk, t/f, dc planning                                      User Key       Initials Effective Dates Name Provider Type Discipline    AR 12/02/24 -  Cassidy Molina, RN Registered Nurse Nurse     08/15/24 -  Sunny Esquivel, PT DPT Physical Therapist PT                  PT Recommendation and Plan     Plan of Care Reviewed With: patient  Progress: no change  Outcome Evaluation: pt agreed to therapy, trans to EOB cga, pt leaning to R due to pain BLE, pt performed BLE AP, LAQ x 10 reps, requested to pay down due to BLE pain, trans back to bed cga, pt would benefit from SNF   Outcome Measures       Row Name 06/11/25 1100             How much help from another person do you currently need...    Turning from your back to your side while in flat bed without using bedrails? 3  -AH      Moving from lying on back to sitting on the side of a flat bed without bedrails? 3  -AH      Moving to and from a bed to a chair (including a wheelchair)? 2  -AH      Standing up from a chair using your arms (e.g., wheelchair, bedside chair)? 2  -AH      Climbing 3-5 steps with a railing? 1  -AH      To walk in hospital room? 1  -AH      AM-PAC 6 Clicks Score (PT) 12  -AH         Functional Assessment    Outcome Measure Options AM-PAC 6 Clicks Basic Mobility (PT)  -AH                User Key  (r) = Recorded By, (t) = Taken By, (c) = Cosigned By      Initials Name Provider Type     Candace Dc, PTA Physical Therapist Assistant                     Time Calculation:    PT  Charges       Row Name 06/11/25 1105             Time Calculation    Start Time 1050  -      Stop Time 1104  -      Time Calculation (min) 14 min  -      PT Received On 06/11/25  -         Time Calculation- PT    Total Timed Code Minutes- PT 14 minute(s)  -         Timed Charges    56061 - PT Therapeutic Activity Minutes 14  -AH         Total Minutes    Timed Charges Total Minutes 14  -AH       Total Minutes 14  -AH                User Key  (r) = Recorded By, (t) = Taken By, (c) = Cosigned By      Initials Name Provider Type     Candace Dc, PTA Physical Therapist Assistant                  Therapy Charges for Today       Code Description Service Date Service Provider Modifiers Qty    02601421141 HC PT THERAPEUTIC ACT EA 15 MIN 6/11/2025 Candace Dc PTA GP 1            PT G-Codes  Outcome Measure Options: AM-PAC 6 Clicks Basic Mobility (PT)  AM-PAC 6 Clicks Score (PT): 12    Candace Dc PTA  6/11/2025

## 2025-06-12 ENCOUNTER — APPOINTMENT (OUTPATIENT)
Dept: NUCLEAR MEDICINE | Facility: HOSPITAL | Age: 85
DRG: 690 | End: 2025-06-12
Payer: MEDICARE

## 2025-06-12 LAB
ANION GAP SERPL CALCULATED.3IONS-SCNC: 11 MMOL/L (ref 5–15)
BUN SERPL-MCNC: 25.3 MG/DL (ref 8–23)
BUN/CREAT SERPL: 13.4 (ref 7–25)
CALCIUM SPEC-SCNC: 8.5 MG/DL (ref 8.6–10.5)
CHLORIDE SERPL-SCNC: 107 MMOL/L (ref 98–107)
CO2 SERPL-SCNC: 22 MMOL/L (ref 22–29)
CREAT SERPL-MCNC: 1.89 MG/DL (ref 0.57–1)
DEPRECATED RDW RBC AUTO: 40.6 FL (ref 37–54)
EGFRCR SERPLBLD CKD-EPI 2021: 26.3 ML/MIN/1.73
ERYTHROCYTE [DISTWIDTH] IN BLOOD BY AUTOMATED COUNT: 13.1 % (ref 12.3–15.4)
GLUCOSE SERPL-MCNC: 104 MG/DL (ref 65–99)
HCT VFR BLD AUTO: 21.2 % (ref 34–46.6)
HGB BLD-MCNC: 7.2 G/DL (ref 12–15.9)
MCH RBC QN AUTO: 29.3 PG (ref 26.6–33)
MCHC RBC AUTO-ENTMCNC: 34 G/DL (ref 31.5–35.7)
MCV RBC AUTO: 86.2 FL (ref 79–97)
PLATELET # BLD AUTO: 126 10*3/MM3 (ref 140–450)
PMV BLD AUTO: 11.1 FL (ref 6–12)
POTASSIUM SERPL-SCNC: 3.7 MMOL/L (ref 3.5–5.2)
RBC # BLD AUTO: 2.46 10*6/MM3 (ref 3.77–5.28)
SODIUM SERPL-SCNC: 140 MMOL/L (ref 136–145)
WBC NRBC COR # BLD AUTO: 7 10*3/MM3 (ref 3.4–10.8)

## 2025-06-12 PROCEDURE — 25010000002 FUROSEMIDE PER 20 MG: Performed by: INTERNAL MEDICINE

## 2025-06-12 PROCEDURE — 25010000002 NA FERRIC GLUC CPLX PER 12.5 MG: Performed by: INTERNAL MEDICINE

## 2025-06-12 PROCEDURE — 80048 BASIC METABOLIC PNL TOTAL CA: CPT | Performed by: INTERNAL MEDICINE

## 2025-06-12 PROCEDURE — 99232 SBSQ HOSP IP/OBS MODERATE 35: CPT | Performed by: UROLOGY

## 2025-06-12 PROCEDURE — 34310000005 TECHNETIUM MERTIATIDE: Performed by: INTERNAL MEDICINE

## 2025-06-12 PROCEDURE — 85027 COMPLETE CBC AUTOMATED: CPT | Performed by: INTERNAL MEDICINE

## 2025-06-12 PROCEDURE — 25810000003 SODIUM CHLORIDE 0.9 % SOLUTION: Performed by: INTERNAL MEDICINE

## 2025-06-12 PROCEDURE — 25010000002 CEFTRIAXONE PER 250 MG: Performed by: INTERNAL MEDICINE

## 2025-06-12 PROCEDURE — A9562 TC99M MERTIATIDE: HCPCS | Performed by: INTERNAL MEDICINE

## 2025-06-12 PROCEDURE — 78708 K FLOW/FUNCT IMAGE W/DRUG: CPT

## 2025-06-12 RX ORDER — POTASSIUM CHLORIDE 1500 MG/1
20 TABLET, EXTENDED RELEASE ORAL ONCE
Status: COMPLETED | OUTPATIENT
Start: 2025-06-12 | End: 2025-06-12

## 2025-06-12 RX ORDER — MAGNESIUM CARB/ALUMINUM HYDROX 105-160MG
150 TABLET,CHEWABLE ORAL ONCE
Status: COMPLETED | OUTPATIENT
Start: 2025-06-12 | End: 2025-06-12

## 2025-06-12 RX ORDER — FUROSEMIDE 10 MG/ML
40 INJECTION INTRAMUSCULAR; INTRAVENOUS
Status: COMPLETED | OUTPATIENT
Start: 2025-06-12 | End: 2025-06-12

## 2025-06-12 RX ADMIN — CEFTRIAXONE SODIUM 1000 MG: 1 INJECTION, POWDER, FOR SOLUTION INTRAMUSCULAR; INTRAVENOUS at 14:56

## 2025-06-12 RX ADMIN — Medication 10 ML: at 21:14

## 2025-06-12 RX ADMIN — Medication 10 ML: at 09:55

## 2025-06-12 RX ADMIN — GABAPENTIN 300 MG: 300 CAPSULE ORAL at 21:14

## 2025-06-12 RX ADMIN — CARVEDILOL 3.12 MG: 3.12 TABLET, FILM COATED ORAL at 18:50

## 2025-06-12 RX ADMIN — DOCUSATE SODIUM 50 MG AND SENNOSIDES 8.6 MG 2 TABLET: 8.6; 5 TABLET, FILM COATED ORAL at 21:14

## 2025-06-12 RX ADMIN — DONEPEZIL HYDROCHLORIDE 10 MG: 10 TABLET, FILM COATED ORAL at 21:14

## 2025-06-12 RX ADMIN — FUROSEMIDE 40 MG: 10 INJECTION, SOLUTION INTRAMUSCULAR; INTRAVENOUS at 13:05

## 2025-06-12 RX ADMIN — OXYCODONE HYDROCHLORIDE 15 MG: 5 TABLET ORAL at 14:56

## 2025-06-12 RX ADMIN — TECHNESCAN TC 99M MERTIATIDE 1 DOSE: 1 INJECTION, POWDER, LYOPHILIZED, FOR SOLUTION INTRAVENOUS at 12:45

## 2025-06-12 RX ADMIN — POTASSIUM CHLORIDE 20 MEQ: 1500 TABLET, EXTENDED RELEASE ORAL at 14:56

## 2025-06-12 RX ADMIN — SODIUM CHLORIDE 250 MG: 9 INJECTION, SOLUTION INTRAVENOUS at 15:42

## 2025-06-12 RX ADMIN — MAGNESIUM CITRATE 150 ML: 1.75 LIQUID ORAL at 15:42

## 2025-06-12 NOTE — PROGRESS NOTES
Urology  Length of Stay: 5  Patient Care Team:  Katy Leone DO as PCP - General (Family Medicine)  [facility]  Eleazar Ramires MD as Cardiologist (Cardiology)  Shasta Madrigal MD as Referring Physician (Obstetrics and Gynecology)  Holly Chavez MD as Consulting Physician (General Surgery)    Chief Complaint:  Hydronephrosis associated with urinary retention; urosepsis     Subjective     Interval History:   Patient without new complaints.  Serum creatinine down to 1.9 today.  Hemoglobin 7.2, white blood cell count 7.0.  In reviewing patient's chart it appears that her ceftriaxone was last given yesterday and is no longer on her MAR.  Renal ultrasound with Smith in place yesterday shows persistent moderate right hydronephrosis despite clinical improvement.    Review of Systems:   Review of Systems    Objective       Intake/Output Summary (Last 24 hours) at 6/12/2025 1015  Last data filed at 6/12/2025 0741  Gross per 24 hour   Intake --   Output 2125 ml   Net -2125 ml       Vital Signs  Temp:  [97.7 °F (36.5 °C)-98.8 °F (37.1 °C)] 98.5 °F (36.9 °C)  Heart Rate:  [82-97] 82  Resp:  [16-18] 16  BP: (131-152)/(46-83) 133/47    Physical Exam:  Physical Exam  Constitutional:       General: She is not in acute distress.     Appearance: Normal appearance. She is not toxic-appearing.   HENT:      Head: Normocephalic and atraumatic.   Pulmonary:      Effort: Pulmonary effort is normal.   Abdominal:      General: Abdomen is flat.      Palpations: Abdomen is soft.   Genitourinary:     Comments: Urine clear via Smith  Neurological:      Mental Status: She is alert.          Results Review:       I reviewed the patient's new clinical results.  Lab Results (last 24 hours)       Procedure Component Value Units Date/Time    Basic Metabolic Panel [[id number]]  (Abnormal) Collected: 06/12/25 0824    Specimen: Blood Updated: 06/12/25 0912     Glucose 104 mg/dL      BUN 25.3 mg/dL      Creatinine 1.89 mg/dL       Sodium 140 mmol/L      Potassium 3.7 mmol/L      Chloride 107 mmol/L      CO2 22.0 mmol/L      Calcium 8.5 mg/dL      BUN/Creatinine Ratio 13.4     Anion Gap 11.0 mmol/L      eGFR 26.3 mL/min/1.73     Narrative:      GFR Categories in Chronic Kidney Disease (CKD)              GFR Category          GFR (mL/min/1.73)    Interpretation  G1                    90 or greater        Normal or high (1)  G2                    60-89                Mild decrease (1)  G3a                   45-59                Mild to moderate decrease  G3b                   30-44                Moderate to severe decrease  G4                    15-29                Severe decrease  G5                    14 or less           Kidney failure    (1)In the absence of evidence of kidney disease, neither GFR category G1 or G2 fulfill the criteria for CKD.    eGFR calculation 2021 CKD-EPI creatinine equation, which does not include race as a factor    CBC (No Diff) [[id number]]  (Abnormal) Collected: 06/12/25 0824    Specimen: Blood Updated: 06/12/25 0857     WBC 7.00 10*3/mm3      RBC 2.46 10*6/mm3      Hemoglobin 7.2 g/dL      Hematocrit 21.2 %      MCV 86.2 fL      MCH 29.3 pg      MCHC 34.0 g/dL      RDW 13.1 %      RDW-SD 40.6 fl      MPV 11.1 fL      Platelets 126 10*3/mm3     Potassium [[id number]]  (Normal) Collected: 06/11/25 1253    Specimen: Blood Updated: 06/11/25 1321     Potassium 3.6 mmol/L     Blood Culture With YOLA - Blood, Arm, Right [[id number]]  (Normal) Collected: 06/08/25 1043    Specimen: Blood from Arm, Right Updated: 06/11/25 1130     Blood Culture No growth at 3 days          Imaging Results (Last 24 Hours)       Procedure Component Value Units Date/Time    XR Abdomen KUB [[id number]] Collected: 06/11/25 1943     Updated: 06/11/25 1947    Narrative:      EXAMINATION: XR ABDOMEN KUB-     HISTORY: follow-up constipation; R62.7-Adult failure to thrive;  R41.82-Altered mental status, unspecified; N17.9-Acute kidney  failure,  unspecified; N18.9-Chronic kidney disease, unspecified; N39.0-Urinary  tract infection, site not specified; D64.9-Anemia, unspecified;  Z75.8-Other problems related to medical facilities and other health  care; Z74.09-Other reduced mobility     Images are stored in PACS per institutional protocol.     1 view abdomen/KUB.     Degenerative spine changes.  No bowel obstruction.  Cholecystectomy clips noted.     Prominent fecal material throughout the descending colon.     Moderate fecal material seen within the ascending colon.       Impression:      1. Moderate constipation.           This report was signed and finalized on 6/11/2025 7:44 PM by Dr. Adam Monroy MD.       US Renal Bilateral [[id number]] Collected: 06/11/25 1545     Updated: 06/11/25 1549    Narrative:      EXAM/TECHNIQUE: US RENAL BILATERAL-     INDICATION: Right hydronephrosis, reevaluate after mcclain placement;  R62.7-Adult failure to thrive; R41.82-Altered mental status,  unspecified; N17.9-Acute kidney failure, unspecified; N18.9-Chronic  kidney disease, unspecified; N39.0-Urinary tract infection, site not  specified; D64.9-Anemia, unspecified; Z75.8-Other problems related to  medical facilities and other health care; Z74.09-Other reduced mobility     COMPARISON: CT 6/10/2025     FINDINGS:     Bladder is not well visualized secondary to overlying bowel gas and  decompression by Mcclain catheter.     Right kidney is 8.9 cm in length and demonstrates normal cortical  thickness and echogenicity. Moderate right-sided hydronephrosis is  present. No right-sided shadowing calculi.     Left kidney is 10.0 cm in length and demonstrates normal cortical  thickness and echogenicity. No left-sided hydronephrosis or shadowing  calculi.       Impression:      1. Moderate right-sided hydronephrosis  2. No left-sided hydronephrosis.     This report was signed and finalized on 6/11/2025 3:46 PM by Dr. Bakari Ramos MD.               Medication Review:      Current Facility-Administered Medications:     acetaminophen (TYLENOL) tablet 650 mg, 650 mg, Oral, Q4H PRN, 650 mg at 06/10/25 2006 **OR** acetaminophen (TYLENOL) 160 MG/5ML oral solution 650 mg, 650 mg, Oral, Q4H PRN **OR** acetaminophen (TYLENOL) suppository 650 mg, 650 mg, Rectal, Q4H PRN, Holli Menendez APRN    amLODIPine (NORVASC) tablet 5 mg, 5 mg, Oral, Q24H, Abdirahman Schroeder MD    atorvastatin (LIPITOR) tablet 40 mg, 40 mg, Oral, Daily, Holli Menendez APRN, 40 mg at 06/11/25 0813    sennosides-docusate (PERICOLACE) 8.6-50 MG per tablet 2 tablet, 2 tablet, Oral, BID, 2 tablet at 06/11/25 2016 **AND** polyethylene glycol (MIRALAX) packet 17 g, 17 g, Oral, Daily PRN **AND** bisacodyl (DULCOLAX) EC tablet 5 mg, 5 mg, Oral, Daily PRN **AND** [DISCONTINUED] bisacodyl (DULCOLAX) suppository 10 mg, 10 mg, Rectal, Daily PRN, Holli Menendez APRN    bisacodyl (DULCOLAX) suppository 10 mg, 10 mg, Rectal, Daily, Abdirahman Schroeder MD, 10 mg at 06/11/25 0813    Calcium Replacement - Follow Nurse / BPA Driven Protocol, , Not Applicable, PRN, Holli Menendez APRN    carvedilol (COREG) tablet 3.125 mg, 3.125 mg, Oral, BID With Meals, Abdirahman Schroeder MD, 3.125 mg at 06/11/25 1611    donepezil (ARICEPT) tablet 10 mg, 10 mg, Oral, Nightly, Holli Menendez APRN, 10 mg at 06/11/25 2016    gabapentin (NEURONTIN) capsule 300 mg, 300 mg, Oral, Q12H, Holli Menendez APRN, 300 mg at 06/11/25 2016    levothyroxine (SYNTHROID, LEVOTHROID) tablet 50 mcg, 50 mcg, Oral, QAMAEVE, Holli Menendez APRN, 50 mcg at 06/11/25 0548    Magnesium Standard Dose Replacement - Follow Nurse / BPA Driven Protocol, , Not Applicable, PRN, Holli Menendez APRN    nitroglycerin (NITROSTAT) SL tablet 0.4 mg, 0.4 mg, Sublingual, Q5 Min PRN, Holli Menendez APRN    ondansetron ODT (ZOFRAN-ODT) disintegrating tablet 4 mg, 4 mg, Oral, Q6H PRN **OR** ondansetron (ZOFRAN) injection 4 mg, 4 mg, Intravenous, Q6H PRN, Holli Menendez APRN     oxyCODONE (ROXICODONE) immediate release tablet 15 mg, 15 mg, Oral, TID PRN, Menendez, Holli L, APRN, 15 mg at 06/11/25 2016    pantoprazole (PROTONIX) EC tablet 40 mg, 40 mg, Oral, Q AM, MenendezHolli L, APRN, 40 mg at 06/11/25 0548    Phosphorus Replacement - Follow Nurse / BPA Driven Protocol, , Not Applicable, PRN, MenendezHolli L, APRN    polyethylene glycol (MIRALAX) packet 17 g, 17 g, Oral, Daily, Abdirahman Schroeder MD, 17 g at 06/11/25 1611    Potassium Replacement - Follow Nurse / BPA Driven Protocol, , Not Applicable, PRN, MenendezHolli L, APRN    sertraline (ZOLOFT) tablet 25 mg, 25 mg, Oral, Daily, MenendezHolli L, APRN, 25 mg at 06/11/25 0813    sodium chloride 0.45 % 925 mL with sodium bicarbonate 8.4 % 75 mEq infusion, , Intravenous, Continuous, Basil Bermudez MD, Last Rate: 40 mL/hr at 06/11/25 1719, New Bag at 06/11/25 1719    sodium chloride 0.9 % flush 10 mL, 10 mL, Intravenous, Q12H, Holli Menendez, APRN, 10 mL at 06/12/25 0955    sodium chloride 0.9 % flush 10 mL, 10 mL, Intravenous, PRN, MenendezSuryay L, APRN    sodium chloride 0.9 % infusion 40 mL, 40 mL, Intravenous, PRN, Holli Menendez L, APRN    Problem List:    Altered mental status    Constipation    Anemia    Essential hypertension    Acute UTI (urinary tract infection)    Dementia    Hydronephrosis    TOMÁS (acute kidney injury)    Generalized weakness     Assessment/Plan:   Bilateral hydronephrosis associated with incomplete bladder emptying.  Persistent right hydronephrosis seen on ultrasound with Smith in place, but clinically improving.  Urosepsis    Plan: Patient had been made n.p.o. in case of need for stent placement.  This may still be necessary but given her clinical improvement with a Smith in place, I would like to evaluate this further before making a commitment to take her under anesthesia.  Her diet has been resumed and a nuclear medicine Lasix renal scan has been ordered.  I have taken the liberty of  restarting her ceftriaxone.    (Please note that portions of this note were completed with a voice recognition program.)  Rogers Cancino MD  06/12/25  10:15 CDT

## 2025-06-12 NOTE — CASE MANAGEMENT/SOCIAL WORK
Continued Stay Note  [facility]     Patient Name: Jennifer Gomes  MRN: [id number]  Today's Date: 6/12/2025    Admit Date: 6/7/2025    Plan: Referral to [facility]   Discharge Plan       Row Name 06/12/25 1159       Plan    Plan Comments Per [facility] intake they can accept pt. Will need HH orders at [place].    Final Discharge Disposition Code 06 - home with home health care                   Discharge Codes    No documentation.                 Expected Discharge Date and Time       Expected Discharge Date Expected Discharge Time    Jun 11, 2025               MICHAEL Burt

## 2025-06-12 NOTE — NURSING NOTE
[facility]  INPATIENT WOUND & OSTOMY CARE    Today's Date: 06/12/25    Patient Name: Jennifer Gomes  MRN: [id number]  CSN: [id number]  PCP: Katy Leone DO  Attending Provider: Abdirahman Schroeder MD  Length of Stay: 5    Wound care consulted for skin assessment and preventative care. Patient was admitted with AMS on 6/7/2025. Patient is currently lying in bed with no family at bedside.     Patient has no open wounds at this time. She is incontinent and does have some excoriation on her bilateral buttocks/coccyx and groin/ perineum. Nurse at bedside states patient may be getting Mag Citrate. Recommended to nurse to keep brief off and remove sacral spine bordered dressing if it starts to get saturated with stool. Patient is at very high risk of skin breakdown and pressure prevention orders are placed.     Wound RN Orders (last 12 hours) (12h ago, onward)       Start     Ordered    06/12/25 1408  Specialty Bed Dolphin FIS Mattress  Once        Comments: For Dolphin FIS Mattress, call [employer] to order a [email or web address] bed frame.  If bariatric/bariatric dolphin, [employer] provides frame, mattress, pump, and trapeze (if needed).  Nurse or HUC is to call [employer], the rental company, to order the equipment, provide patient's name, room number, and if in isolation. [phone].   Question:  Specialty Bed needed  Answer:  Dolphin FIS Mattress    06/12/25 1409    06/12/25 1407  Turn Patient  Now Then Every 2 Hours         06/12/25 1409    06/12/25 1407  Elevate Heels Off of Bed  Until Discontinued         06/12/25 1409    06/12/25 1407  Use Seat Cushion When Up In Chair  Continuous         06/12/25 1409    06/12/25 1407  Silicone Border Dressing to Bony Prominences  Per Order Details        Comments: Apply silicone border foam dressing per protocol to sacral spine/bilateral heels for protection. Nursing to change dressing every 3 days and PRN if soiled. Nursing is to peel back dressing with every assessment to  assess skin underneath dressing. No barrier cream under dressing.    06/12/25 1409    06/12/25 1407  Use Repositioning Wedge to Position Patient  Continuous         06/12/25 1409    Unscheduled  Wound Care  As Needed      Question:  Wound Care Instructions  Answer:  Apply Zinc barrier cream to bilateral groins and perineum after every episode of incotinence    06/12/25 1409          Inpatient wound care will continue to follow during hospital stay.  Please contact if any issues or concerns arise.     This document has been electronically signed by Azucena Bowen RN on 6/12/2025 14:10 CDT

## 2025-06-12 NOTE — PROGRESS NOTES
Patient Name: Jennifer Gomes  Date of Admission: 6/7/2025  Today's Date: 06/12/25  Length of Stay: 5  Primary Care Physician: Katy Leone DO    Subjective   Chief Complaint: follow-up constipation  HPI   Patient just returned from radiology (study ordered by urology).  Patient voices no new complaints.  She reports that she is feeling a little bit better each day.  She indicates that she still feels like that she is slightly constipated, and request something stronger to help her move her bowels.  No family at bedside.    Documented weights    06/07/25 1017   Weight: 60.8 kg (134 lb)          Intake/Output Summary (Last 24 hours) at 6/12/2025 1340  Last data filed at 6/12/2025 0741  Gross per 24 hour   Intake --   Output 1650 ml   Net -1650 ml       Results for orders placed during the hospital encounter of 11/12/24    Adult Transthoracic Echo Complete W/ Cont if Necessary Per Protocol    Interpretation Summary    Left ventricular ejection fraction appears to be 66 - 70%.    Left ventricular wall thickness is consistent with mild concentric hypertrophy.    Left ventricular diastolic function is consistent with (grade Ia w/high LAP) impaired relaxation.    Left atrial volume is moderately increased.    Mild pulmonary hypertension is present.       Review of Systems   All pertinent negatives and positives are as above. All other systems have been reviewed and are negative unless otherwise stated.     Objective    Temp:  [97.7 °F (36.5 °C)-98.8 °F (37.1 °C)] 98.2 °F (36.8 °C)  Heart Rate:  [82-97] 88  Resp:  [16-18] 18  BP: (131-152)/(46-83) 141/56  Physical Exam  Vitals reviewed.   Constitutional:       Appearance: She is not toxic-appearing.   HENT:      Head: Normocephalic.      Mouth/Throat:      Mouth: Mucous membranes are moist.   Pulmonary:      Effort: Pulmonary effort is normal. No respiratory distress.   Abdominal:      Palpations: Abdomen is soft.      Tenderness: There is no abdominal tenderness. There  is no guarding.      Comments: Some fullness noted lower abdomen   Genitourinary:     Comments: Smith in place; normal appearing urine  Musculoskeletal:         General: No swelling.   Skin:     General: Skin is warm.   Neurological:      Mental Status: She is alert.      Motor: Weakness present.   Psychiatric:         Mood and Affect: Mood normal.         Results Review:  I have reviewed the labs, radiology results, and diagnostic studies.    Laboratory Data:   Results from last 7 days   Lab Units 06/12/25  0824 06/11/25  0546 06/10/25  0309   WBC 10*3/mm3 7.00 6.47 8.11   HEMOGLOBIN g/dL 7.2* 7.1* 7.8*   HEMATOCRIT % 21.2* 22.4* 24.9*   PLATELETS 10*3/mm3 126* 137* 176        Results from last 7 days   Lab Units 06/12/25  0824 06/11/25  1253 06/11/25  0546 06/10/25  0309 06/09/25  0501 06/08/25  0342 06/07/25  1255   SODIUM mmol/L 140  --  141 139 139   < > 138   POTASSIUM mmol/L 3.7 3.6 3.5 4.1 4.5   < > 4.6   CHLORIDE mmol/L 107  --  108* 109* 114*   < > 106   CO2 mmol/L 22.0  --  23.0 19.0* 19.0*   < > 21.0*   BUN mg/dL 25.3*  --  34.2* 41.3* 47.3*   < > 48.7*   CREATININE mg/dL 1.89*  --  2.15* 2.77* 2.75*   < > 2.77*   CALCIUM mg/dL 8.5*  --  8.4* 8.7 8.5*   < > 9.3   BILIRUBIN mg/dL  --   --   --   --  <0.2  --  <0.2   ALK PHOS U/L  --   --   --   --  48  --  60   ALT (SGPT) U/L  --   --   --   --  <5  --  6   AST (SGOT) U/L  --   --   --   --  6  --  12   GLUCOSE mg/dL 104*  --  110* 116* 100*   < > 103*    < > = values in this interval not displayed.       Culture Data:     Microbiology Results (last 10 days)       Procedure Component Value - Date/Time    Blood Culture With YOLA - Blood, Arm, Right [[id number]]  (Normal) Collected: 06/08/25 1043    Lab Status: Preliminary result Specimen: Blood from Arm, Right Updated: 06/12/25 1130     Blood Culture No growth at 4 days    Urine Culture - Urine, Urine, Catheter [[id number]]  (Abnormal)  (Susceptibility) Collected: 06/07/25 1148    Lab Status: Final result  Specimen: Urine, Catheter Updated: 06/09/25 0935     Urine Culture >100,000 CFU/mL Escherichia coli    Narrative:      Colonization of the urinary tract without infection is common. Treatment is discouraged unless the patient is symptomatic, pregnant, or undergoing an invasive urologic procedure.    Susceptibility        Escherichia coli      CHULA      Amoxicillin + Clavulanate Resistant      Ampicillin Resistant      Ampicillin + Sulbactam Intermediate      Cefazolin (Urine) Susceptible      Cefepime Susceptible      Ceftazidime Susceptible      Ceftriaxone Susceptible      Cefuroxime axetil Intermediate      Gentamicin Susceptible      Levofloxacin Susceptible      Nitrofurantoin Susceptible      Piperacillin + Tazobactam Susceptible      Trimethoprim + Sulfamethoxazole Susceptible                                    Radiology Data:   Imaging Results (Last 7 Days)       Procedure Component Value Units Date/Time    NM Renal With Flow & Function With Pharmacological Intervention - In process [[id number]] Resulted: 06/12/25 1249     Updated: 06/12/25 1249    This result has not been signed. Information might be incomplete.      XR Abdomen KUB [[id number]] Collected: 06/11/25 1943     Updated: 06/11/25 1947    Narrative:      EXAMINATION: XR ABDOMEN KUB-     HISTORY: follow-up constipation; R62.7-Adult failure to thrive;  R41.82-Altered mental status, unspecified; N17.9-Acute kidney failure,  unspecified; N18.9-Chronic kidney disease, unspecified; N39.0-Urinary  tract infection, site not specified; D64.9-Anemia, unspecified;  Z75.8-Other problems related to medical facilities and other health  care; Z74.09-Other reduced mobility     Images are stored in PACS per institutional protocol.     1 view abdomen/KUB.     Degenerative spine changes.  No bowel obstruction.  Cholecystectomy clips noted.     Prominent fecal material throughout the descending colon.     Moderate fecal material seen within the ascending colon.        Impression:      1. Moderate constipation.           This report was signed and finalized on 6/11/2025 7:44 PM by Dr. Adam Monroy MD.       US Renal Bilateral [[id number]] Collected: 06/11/25 1545     Updated: 06/11/25 1549    Narrative:      EXAM/TECHNIQUE: US RENAL BILATERAL-     INDICATION: Right hydronephrosis, reevaluate after mcclain placement;  R62.7-Adult failure to thrive; R41.82-Altered mental status,  unspecified; N17.9-Acute kidney failure, unspecified; N18.9-Chronic  kidney disease, unspecified; N39.0-Urinary tract infection, site not  specified; D64.9-Anemia, unspecified; Z75.8-Other problems related to  medical facilities and other health care; Z74.09-Other reduced mobility     COMPARISON: CT 6/10/2025     FINDINGS:     Bladder is not well visualized secondary to overlying bowel gas and  decompression by Mcclain catheter.     Right kidney is 8.9 cm in length and demonstrates normal cortical  thickness and echogenicity. Moderate right-sided hydronephrosis is  present. No right-sided shadowing calculi.     Left kidney is 10.0 cm in length and demonstrates normal cortical  thickness and echogenicity. No left-sided hydronephrosis or shadowing  calculi.       Impression:      1. Moderate right-sided hydronephrosis  2. No left-sided hydronephrosis.     This report was signed and finalized on 6/11/2025 3:46 PM by Dr. Bakari Ramos MD.       CT Abdomen Pelvis Without Contrast [[id number]] Collected: 06/10/25 1505     Updated: 06/10/25 1519    Narrative:      EXAM/TECHNIQUE: CT abdomen/pelvis without contrast     INDICATION: right hydronephrosis on ultrasound; R62.7-Adult failure to  thrive; R41.82-Altered mental status, unspecified; N17.9-Acute kidney  failure, unspecified; N18.9-Chronic kidney disease, unspecified;  N39.0-Urinary tract infection, site not specified; D64.9-Anemia,  unspecified; Z75.8-Other problems related to medical facilities and  other health care; Z74.09-Other reduced mobility      COMPARISON: 11/12/2024     DLP: 742.81 mGy.cm. Automated exposure control was utilized to decrease  patient radiation dose.     FINDINGS:     LOWER CHEST: No acute findings.     LIVER AND BILIARY: No suspicious liver lesion. The gallbladder is  surgically absent. No biliary dilatation.     PANCREAS: Unremarkable.      SPLEEN: Unremarkable.      ADRENAL: Unremarkable.     KIDNEYS/BLADDER: No large renal lesion on this noncontrast exam.  Moderate right and mild left hydronephrosis extending to the urinary  bladder. No ureteral stone. Diffuse urinary bladder wall thickening.     BOWEL: Large volume stool in the rectum with rectal wall thickening and  perirectal fat stranding. No colonic wall thickening or pericolonic fat  stranding. Normal appendix. No small bowel distention or inflammatory  change.      PERITONEUM: No ascites.      PELVIC ORGANS: Prior hysterectomy.     VASCULATURE: Atherosclerotic nonaneurysmal abdominal aorta.     LYMPH NODES: No enlarged lymph nodes.      SOFT TISSUES: Diffuse body wall soft tissue edema.     BONES: Multilevel lumbar spine degenerative change. Severe central canal  stenosis at L4-L5. Degenerative grade 1 anterolisthesis of L4 on L5.       Impression:         1.  Large volume stool in the rectum with rectal wall thickening and  perirectal fat stranding. Findings are in keeping with stercoral  colitis.     2.  Moderate right and mild left hydronephrosis extending to the urinary  bladder, without evidence of obstructing stone or mass. I suspect  hydronephrosis is related to compression by the distended stool-filled  rectum.     3.  Diffuse urinary bladder wall thickening, which can be seen in the  setting of cystitis.           This report was signed and finalized on 6/10/2025 3:16 PM by Dr. Bakari Ramos MD.       US Renal Bilateral [[id number]] Collected: 06/09/25 1644     Updated: 06/09/25 1649    Narrative:      EXAMINATION: US RENAL BILATERAL-     HISTORY: Acute kidney  injury; R62.7-Adult failure to thrive;  R41.82-Altered mental status, unspecified; N17.9-Acute kidney failure,  unspecified; N18.9-Chronic kidney disease, unspecified; N39.0-Urinary  tract infection, site not specified; D64.9-Anemia, unspecified;  Z75.8-Other problems related to medical facilities and other health care     Images are stored in PACS per institutional protocol.     Grayscale and color-flow renal ultrasound.     COMPARISON:  12/18/2024 ultrasound.  11/12/2024 CT.     Normal size and position of both kidneys.  Mildly echogenic renal parenchyma.     Moderate RIGHT hydronephrosis.  No shadowing stone.     The right kidney measures 89 x 48 x 46 mm.     The left kidney measures 100 x 46 x 48 mm.       Impression:      Impression:  1. Moderate RIGHT hydronephrosis.           This report was signed and finalized on 6/9/2025 4:46 PM by Dr. Adam Monroy MD.       XR Chest 1 View [[id number]] Collected: 06/07/25 1205     Updated: 06/07/25 1210    Narrative:      XR CHEST 1 VW-     HISTORY: fall     COMPARISON: 12/18/2024     FINDINGS: Frontal view of the chest obtained.     Low lung volumes. Mild elevation right hemidiaphragm. Right basilar  densities favoring atelectasis. Cardiomediastinal contour is normal.  Calcified left hilar lymph nodes. No pleural effusion or pneumothorax.  No acute regional bony pathology. Right upper quadrant surgical clips.       Impression:      1. Low lung volumes with right basilar atelectasis and mild elevation  right hemidiaphragm. No pleural effusion or pneumothorax.        This report was signed and finalized on 6/7/2025 12:07 PM by Dr. Blanka Pak MD.       CT Head Without Contrast [[id number]] Collected: 06/07/25 1119     Updated: 06/07/25 1124    Narrative:      CT HEAD WO CONTRAST-      HISTORY: ams      COMPARISON: 12/18/2024     TOTAL DOSE LENGTH PRODUCT: 736.6 mGy.cm. Automated exposure control was  also utilized to decrease patient radiation dose.     TECHNIQUE:  Axial images of the brain are obtained without contrast     FINDINGS: Similar moderate generalized volume loss with stable appearing  bilateral periventricular and white matter hypodensities. No  intracranial hemorrhage or mass effect. No convincing acute signs of  ischemia. No extra-axial hematoma or subarachnoid hemorrhage.     Chronic opacification right maxillary sinus. Mastoid air cells  well-aerated. Bony calvarium appears intact.       Impression:         1. No acute intracranial abnormality identified. Similar moderate  generalized volume loss with  chronic small vessel ischemic change.  Chronic opacified right maxillary sinus.     This report was signed and finalized on 6/7/2025 11:21 AM by Dr. Blanka Pak MD.                I have reviewed the patient's current medications.     amLODIPine, 5 mg, Oral, Q24H  atorvastatin, 40 mg, Oral, Daily  bisacodyl, 10 mg, Rectal, Daily  carvedilol, 3.125 mg, Oral, BID With Meals  cefTRIAXone, 1,000 mg, Intravenous, Q24H  donepezil, 10 mg, Oral, Nightly  gabapentin, 300 mg, Oral, Q12H  levothyroxine, 50 mcg, Oral, QAM  pantoprazole, 40 mg, Oral, Q AM  polyethylene glycol, 17 g, Oral, Daily  senna-docusate sodium, 2 tablet, Oral, BID  sertraline, 25 mg, Oral, Daily  sodium chloride, 10 mL, Intravenous, Q12H            Assessment/Plan   Assessment  Active Hospital Problems    Diagnosis     **Altered mental status     Generalized weakness     TOMÁS (acute kidney injury)     Hydronephrosis     Dementia     Acute UTI (urinary tract infection)     Essential hypertension     Anemia     Constipation        Treatment Plan  1.   Persistent right hydronephrosis on repeat renal US  2.   Urology recommendations reviewed and additional workup ongoing  3.   Smith catheter in place  4.   Repeat basic metabolic panel in a.m. tomorrow; steady improvement in Cr noted  5.   IV ceftriaxone-day 6 of 7; urine culture is positive for E. coli susceptible to ceftriaxone  6.   Coreg and  amlodipine restarted; monitor blood pressure trend  7.   Bowel regimen; add magnesium citrate today  8.   Repeat KUB still reveals moderate constipation  9.   PT and OT  10.  Plan to repeat CBC in AM - monitor H/H and platelets have been trending down  11.  Plan to give another dose of IV ferric gluconate today  12.   Disposition: Based upon the conversation I had with social work/case management it is the patient/family intent for her to return home with home health services.  [facility] did offer a bed pending precertification, however family has made the decision for her to return home with home health services.      Medical Decision Making     3 problems, acute, moderate complexity, unchanged  2 Problems, chronic, moderate complexity, unchanged     Number and Complexity of problems: 5  Differential Diagnosis: None     Conditions and Status        Condition is improving     [facility] Data  External documents reviewed: None  Cardiac tracing (EKG, telemetry) interpretation: no new EKGs  Radiology interpretation: Repeat Renal US results reviewed; KUB results reviewed  Labs reviewed: Yes  Any tests that were considered but not ordered: None     Decision rules/scores evaluated (example [id number], Wells, etc): None     Discussed with: Patient     Care Planning  Shared decision making: Patient  Code status and discussions: No CPR  Surrogate Decision Maker spouse, Adams Gomes     Disposition  Social Determinants of Health that impact treatment or disposition: None  I expect the patient to be discharged to home with  services in 1-2 days      Electronically signed by Abdirahman Schroeder MD-BC, 06/12/25, 13:40 CDT.

## 2025-06-12 NOTE — PROGRESS NOTES
Nephrology ([facility]) Progress Note      Patient:  Jennifer Gomes  YOB: 1940  Date of Service: 6/12/2025  MRN: [id number]   Acct: [id number]   Primary Care Physician: Katy Leone DO  Advance Directive:   Code Status and Medical Interventions: No CPR (Do Not Attempt to Resuscitate); Limited Support; No intubation (DNI)   Ordered at: 06/07/25 1414     Code Status (Patient has no pulse and is not breathing):    No CPR (Do Not Attempt to Resuscitate)     Medical Interventions (Patient has pulse or is breathing):    Limited Support     Medical Intervention Limits:    No intubation (DNI)     Level Of Support Discussed With:    Health Care Surrogate     Admit Date: 6/7/2025       Hospital Day: 5  Referring Provider: No Known Provider      Patient personally seen and examined.  Complete chart including Consults, Notes, Operative Reports, Labs, Cardiology, and Radiology studies reviewed as able.        Subjective:  Jennifer Gomes is a 84 y.o. female for whom we were consulted for evaluation and treatment of acute kidney injury with a history of chronic kidney disease of unknown stage.  No prior nephrologic evaluations.  She also has a history of hypertension, GERD, fibromyalgia, depression, sleep apnea and dementia.  She presented to the emergency room with worsening mental status and was diagnosed with cystitis and acute kidney injury.  She was given IV antibiotics and fluids for treatment.    Today, no overnight events.  She denied current chest pain, shortness of air at rest, nausea or vomiting.  No family present today.  Smith catheter replaced with subsequent improvement in renal function over the next several days.    Allergies:  Ropinirole hcl, Codeine, Definity [perflutren lipid microsphere], Ambien [zolpidem], Eszopiclone, Pregabalin, and Tizanidine    Home Meds:  Medications Prior to Admission   Medication Sig Dispense Refill Last Dose/Taking    amLODIPine (NORVASC) 5  MG tablet Take 1 tablet by mouth Daily.   Taking    atorvastatin (LIPITOR) 40 MG tablet Take 1 tablet by mouth Daily.   Taking    butalbital-acetaminophen-caffeine (FIORICET, ESGIC) -40 MG per tablet Take 1 tablet by mouth Every 12 (Twelve) Hours.   Taking    carvedilol (COREG) 3.125 MG tablet Take 1 tablet by mouth 2 (Two) Times a Day With Meals.   Taking    cyclobenzaprine (FLEXERIL) 10 MG tablet Take 1 tablet by mouth Every 12 (Twelve) Hours.   Taking    donepezil (ARICEPT) 10 MG tablet Take 1 tablet by mouth Every Night.   Taking    ergocalciferol (ERGOCALCIFEROL) 45442 units capsule Take 1 capsule by mouth 1 (One) Time Per Week. Saturday   Taking    gabapentin (NEURONTIN) 300 MG capsule Take 1 capsule by mouth 2 (Two) Times a Day.   Taking    lansoprazole (PREVACID) 30 MG capsule Take 1 capsule by mouth Daily.   Taking    levothyroxine (SYNTHROID, LEVOTHROID) 50 MCG tablet Take 1 tablet by mouth Daily.   Taking    oxyCODONE (ROXICODONE) 15 MG immediate release tablet Take 1 tablet by mouth 3 times a day.   Taking    sertraline (ZOLOFT) 25 MG tablet Take 1 tablet by mouth Daily. 90 tablet 0 Taking    acetaminophen (TYLENOL) 325 MG tablet Take 2 tablets by mouth Every 6 (Six) Hours As Needed for Mild Pain.          Medicines:  Current Facility-Administered Medications   Medication Dose Route Frequency Provider Last Rate Last Admin    acetaminophen (TYLENOL) tablet 650 mg  650 mg Oral Q4H PRN Holli Menendez APRN   650 mg at 06/10/25 2006    Or    acetaminophen (TYLENOL) 160 MG/5ML oral solution 650 mg  650 mg Oral Q4H PRN Holli Menendez APRN        Or    acetaminophen (TYLENOL) suppository 650 mg  650 mg Rectal Q4H PRN Holli Menendez APRN        amLODIPine (NORVASC) tablet 5 mg  5 mg Oral Q24H Abdirahman Schroeder MD        atorvastatin (LIPITOR) tablet 40 mg  40 mg Oral Daily Holli Menendez APRN   40 mg at 06/11/25 0813    sennosides-docusate (PERICOLACE) 8.6-50 MG per tablet 2 tablet  2 tablet  Oral BID Abdirahman Schroeder MD   2 tablet at 06/11/25 2016    And    polyethylene glycol (MIRALAX) packet 17 g  17 g Oral Daily PRN Abdirahman Schroeder MD        And    bisacodyl (DULCOLAX) EC tablet 5 mg  5 mg Oral Daily PRN Abdirahman Schroeder MD        bisacodyl (DULCOLAX) suppository 10 mg  10 mg Rectal Daily Abdirahman Schroeder MD   10 mg at 06/11/25 0813    Calcium Replacement - Follow Nurse / BPA Driven Protocol   Not Applicable PRN Holli Menendez APRN        carvedilol (COREG) tablet 3.125 mg  3.125 mg Oral BID With Meals Abdirahman Schroeder MD   3.125 mg at 06/11/25 1611    cefTRIAXone (ROCEPHIN) 1,000 mg in sodium chloride 0.9 % 100 mL MBP  1,000 mg Intravenous Q24H Abdirahman Schroeder  mL/hr at 06/12/25 1456 1,000 mg at 06/12/25 1456    donepezil (ARICEPT) tablet 10 mg  10 mg Oral Nightly Holli Menendez APRN   10 mg at 06/11/25 2016    ferric gluconate (FERRLECIT) 250 MG in sodium chloride 0.9% 250 mL IVPB  250 mg Intravenous Once Abdirahman Schroeder  mL/hr at 06/12/25 1542 250 mg at 06/12/25 1542    gabapentin (NEURONTIN) capsule 300 mg  300 mg Oral Q12H Holli Menendez APRN   300 mg at 06/11/25 2016    levothyroxine (SYNTHROID, LEVOTHROID) tablet 50 mcg  50 mcg Oral QAM Holli Menendez APRN   50 mcg at 06/11/25 0548    Magnesium Standard Dose Replacement - Follow Nurse / BPA Driven Protocol   Not Applicable PRN Holli Menendez APRN        nitroglycerin (NITROSTAT) SL tablet 0.4 mg  0.4 mg Sublingual Q5 Min PRN Holli Menendez APRN        ondansetron ODT (ZOFRAN-ODT) disintegrating tablet 4 mg  4 mg Oral Q6H PRN Holli Menendez APRN        Or    ondansetron (ZOFRAN) injection 4 mg  4 mg Intravenous Q6H PRN Holli Menendez APRN        oxyCODONE (ROXICODONE) immediate release tablet 15 mg  15 mg Oral TID PRN Holli Menendez APRN   15 mg at 06/12/25 1456    pantoprazole (PROTONIX) EC tablet 40 mg  40 mg Oral Q AM Holli Menendez APRN   40 mg at 06/11/25 0549     Phosphorus Replacement - Follow Nurse / BPA Driven Protocol   Not Applicable PRN Holli Menendez APRN        polyethylene glycol (MIRALAX) packet 17 g  17 g Oral Daily Abdirahman Schroeder MD   17 g at 25 1611    Potassium Replacement - Follow Nurse / BPA Driven Protocol   Not Applicable PRN Holli Menendez, APRN        sertraline (ZOLOFT) tablet 25 mg  25 mg Oral Daily Holli Menendez APRN   25 mg at 25 0813    sodium chloride 0.45 % 925 mL with sodium bicarbonate 8.4 % 75 mEq infusion   Intravenous Continuous Basil Bermudez MD 40 mL/hr at 25 1719 New Bag at 25 1719    sodium chloride 0.9 % flush 10 mL  10 mL Intravenous Q12H Holli Menendez APRN   10 mL at 25 0955    sodium chloride 0.9 % flush 10 mL  10 mL Intravenous PRN Holli Menendez APRN        sodium chloride 0.9 % infusion 40 mL  40 mL Intravenous PRN Holli Menendez APRN           Past Medical History:  Past Medical History:   Diagnosis Date    Anxiety     Arthritis     Bronchitis     Cancer     uterine    Chronic pain     Depression     Disease of thyroid gland     Fibromyalgia     GERD (gastroesophageal reflux disease)     Headache     History of transfusion     AS     Hyperlipidemia     Hypertension     Incontinence     Insomnia     Leg pain     Lumbar stenosis     Migraines     Peptic ulcer     Restless legs     Sleep apnea     NO C-PAP    UTI (urinary tract infection)     Vaginal bleeding        Past Surgical History:  Past Surgical History:   Procedure Laterality Date    BLADDER REPAIR      MESH HAD TO BE REMOVED IN     BREAST BIOPSY Right 2017    benign    BREAST CYST EXCISION Left     CARDIAC CATHETERIZATION      CARPAL TUNNEL RELEASE      CATARACT EXTRACTION W/ INTRAOCULAR LENS  IMPLANT, BILATERAL      CHOLECYSTECTOMY WITH INTRAOPERATIVE CHOLANGIOGRAM N/A 2023    Procedure: CHOLECYSTECTOMY LAPAROSCOPIC INTRAOPERATIVE CHOLANGIOGRAM;  Surgeon: Gerardo Arciniega MD;  Location:   [facility] OR;  Service: General;  Laterality: N/A;    COLONOSCOPY      COLONOSCOPY N/A 10/01/2021    Procedure: COLONOSCOPY WITH ANESTHESIA;  Surgeon: Tom Velasco DO;  Location: [facility] ENDOSCOPY;  Service: Gastroenterology;  Laterality: N/A;  pre: change in bowel habits  post: diverticulosis. hemorrhoids.   Olivia Mora, MORGAN        CYSTECTOMY      D & C HYSTEROSCOPY N/A 11/06/2017    Procedure: DILATATION AND CURETTAGE HYSTEROSCOPY;  Surgeon: Shasta Madrigal MD;  Location: [facility] OR;  Service:     DILATION AND CURETTAGE, DIAGNOSTIC / THERAPEUTIC  2008    ENDOSCOPY  09/23/2010    Short segment of Arriola's,Moderate chroninc esophagogastritis and negative H.pylori    ENDOSCOPY N/A 09/25/2017    Procedure: ESOPHAGOGASTRODUODENOSCOPY WITH ANESTHESIA;  Surgeon: Tom Velasco DO;  Location: [facility] ENDOSCOPY;  Service:     EYE SURGERY      RETINA    HEMORRHOIDECTOMY SIGMOIDOSCOPY N/A 3/21/2023    Procedure: HEMORRHOIDECTOMY WITH EXAM UNDER ANESTHESIA;  Surgeon: Holly Chavez MD;  Location:  [facility] OR;  Service: General;  Laterality: N/A;    HYSTERECTOMY  12/20/2017    ORIF TIBIA/FIBULA FRACTURES Left 2000    TRANSVAGINAL TAPING SUSPENSION N/A 11/06/2017    Procedure: VAGINAL MESH REVISION;  Surgeon: Shasta Madrigal MD;  Location:  [facility] OR;  Service:     VAGINAL MESH REVISION  2013       Family History  Family History   Problem Relation Age of Onset    Diabetes Mother     Multiple myeloma Mother     Stroke Father     Diabetes Sister     Prostate cancer Brother     Lymphoma Brother         NHL    Ovarian cancer Paternal Aunt     Cancer Paternal Grandmother         metastatic    Lung cancer Paternal Grandfather     Colon cancer Neg Hx     Esophageal cancer Neg Hx     Breast cancer Neg Hx        Social History  Social History     Socioeconomic History    Marital status:    Tobacco Use    Smoking status: Never    Smokeless tobacco: Never   Vaping Use    Vaping status: Never Used   Substance and Sexual  Activity    Alcohol use: Not Currently     Comment: occasional    Drug use: No    Sexual activity: Defer       Review of Systems:  History obtained from chart review and the patient  General ROS: No fever or chills  Respiratory ROS: No cough, shortness of breath, wheezing  Cardiovascular ROS: No chest pain or palpitations  Gastrointestinal ROS: No abdominal pain or melena  Genito-Urinary ROS: No dysuria or hematuria  Psych ROS: No anxiety and depression  14 point ROS reviewed with the patient and negative except as noted above and in the HPI unless unable to obtain.    Objective:  Patient Vitals for the past 24 hrs:   BP Temp Temp src Pulse Resp SpO2   06/12/25 1452 136/52 98.3 °F (36.8 °C) Oral 87 18 98 %   06/12/25 1109 141/56 98.2 °F (36.8 °C) Oral 88 18 98 %   06/12/25 0741 133/47 98.5 °F (36.9 °C) Oral 82 16 97 %   06/12/25 0353 131/47 97.7 °F (36.5 °C) Oral 89 18 96 %   06/11/25 2351 141/56 98.4 °F (36.9 °C) Oral 95 18 95 %   06/11/25 2045 152/83 98.8 °F (37.1 °C) Oral 96 18 99 %   06/11/25 1553 149/46 98.2 °F (36.8 °C) Oral 97 18 100 %       Intake/Output Summary (Last 24 hours) at 6/12/2025 1544  Last data filed at 6/12/2025 1413  Gross per 24 hour   Intake --   Output 2600 ml   Net -2600 ml     General: awake/alert   Chest:  clear to auscultation bilaterally without respiratory distress  CVS: regular rate and rhythm  Abdominal: soft, nontender, positive bowel sounds  Extremities: no cyanosis or edema  Skin: warm and dry without rash      Labs:  Results from last 7 days   Lab Units 06/12/25  0824 06/11/25  0546 06/10/25  0309   WBC 10*3/mm3 7.00 6.47 8.11   HEMOGLOBIN g/dL 7.2* 7.1* 7.8*   HEMATOCRIT % 21.2* 22.4* 24.9*   PLATELETS 10*3/mm3 126* 137* 176         Results from last 7 days   Lab Units 06/12/25  0824 06/11/25  1253 06/11/25  0546 06/10/25  0309 06/09/25  0501 06/08/25  0342 06/07/25  1255   SODIUM mmol/L 140  --  141 139 139   < > 138   POTASSIUM mmol/L 3.7 3.6 3.5 4.1 4.5   < > 4.6   CHLORIDE  mmol/L 107  --  108* 109* 114*   < > 106   CO2 mmol/L 22.0  --  23.0 19.0* 19.0*   < > 21.0*   BUN mg/dL 25.3*  --  34.2* 41.3* 47.3*   < > 48.7*   CREATININE mg/dL 1.89*  --  2.15* 2.77* 2.75*   < > 2.77*   CALCIUM mg/dL 8.5*  --  8.4* 8.7 8.5*   < > 9.3   EGFR mL/min/1.73 26.3*  --  22.5* 16.6* 16.7*   < > 16.6*   BILIRUBIN mg/dL  --   --   --   --  <0.2  --  <0.2   ALK PHOS U/L  --   --   --   --  48  --  60   ALT (SGPT) U/L  --   --   --   --  <5  --  6   AST (SGOT) U/L  --   --   --   --  6  --  12   GLUCOSE mg/dL 104*  --  110* 116* 100*   < > 103*    < > = values in this interval not displayed.       Radiology:   Imaging Results (Last 72 Hours)       Procedure Component Value Units Date/Time    NM Renal With Flow & Function With Pharmacological Intervention [[id number]] Collected: 06/12/25 1435     Updated: 06/12/25 1455    Narrative:      NM RENAL W FLOW AND FUNCTION W PHARMACOLOGICAL INTERVENTION-     HISTORY: Right hydronephrosis after mcclain placement, but improved renal  function. Evaluate for possible obstructiojn; R62.7-Adult failure to  thrive; R41.82-Altered mental status, unspecified; N17.9-Acute kidney  failure, unspecified; N18.9-Chronic kidney disease, unspecified;  N39.0-Urinary tract infection, site not specified; D64.9-Anemia,  unspecified; Z75.8-Other problems related to medical facilities and     COMPARISON: CT abdomen pelvis 6/10/2025, renal ultrasound 6/11/2025     FINDINGS: Following IV injection of 9.5 mCi of technetium 99m MAG3,  dynamic posterior imaging of the abdomen and pelvis is performed. 40 mg  of Lasix administered intravenously 20 minutes following the  pharmaceutical injection with continued dynamic imaging.     The time to peak activity within the left kidney is 4 minutes. Time to  peak activity within the right kidney is prolonged at 22 minutes.  Differential tracer uptake is equal measuring 50% on the left and 50% on  the right.      There slightly prolonged time to half  emptying of tracer from the left  renal collecting system, however there is an appropriate downward curve  even before Lasix administration. Appropriate response with increased  emptying following Lasix. Findings consistent with a nonobstructive  process.     There is prolonged time to peak activity within the right kidney of 22  minutes (peak 2 minutes following the Lasix injection) with a shallow  downward curve from 22 to 40 minutes, never reaching time to half  emptying. Approximately 25% excretion of tracer from the right  kidney/proximal collecting system at 40 minutes.       Impression:      1. Abnormal right renogram with prolonged time to peak activity and a  shallow downward curve following Lasix administration with the time to  half emptying of pharmaceutical not achieved during the 40-minute exam.  With the mild downward curve following Lasix administration a distended  nonobstructive system is favored.  2. Nonobstructive left renal collecting system.  3. Differential tracer uptake is equal, 50% within each kidney.                 This report was signed and finalized on 6/12/2025 2:52 PM by Dr. Blanka Pak MD.       XR Abdomen KUB [[id number]] Collected: 06/11/25 1943     Updated: 06/11/25 1947    Narrative:      EXAMINATION: XR ABDOMEN KUB-     HISTORY: follow-up constipation; R62.7-Adult failure to thrive;  R41.82-Altered mental status, unspecified; N17.9-Acute kidney failure,  unspecified; N18.9-Chronic kidney disease, unspecified; N39.0-Urinary  tract infection, site not specified; D64.9-Anemia, unspecified;  Z75.8-Other problems related to medical facilities and other health  care; Z74.09-Other reduced mobility     Images are stored in PACS per institutional protocol.     1 view abdomen/KUB.     Degenerative spine changes.  No bowel obstruction.  Cholecystectomy clips noted.     Prominent fecal material throughout the descending colon.     Moderate fecal material seen within the ascending  colon.       Impression:      1. Moderate constipation.           This report was signed and finalized on 6/11/2025 7:44 PM by Dr. Adam Monroy MD.       US Renal Bilateral [[id number]] Collected: 06/11/25 1545     Updated: 06/11/25 1549    Narrative:      EXAM/TECHNIQUE: US RENAL BILATERAL-     INDICATION: Right hydronephrosis, reevaluate after mcclain placement;  R62.7-Adult failure to thrive; R41.82-Altered mental status,  unspecified; N17.9-Acute kidney failure, unspecified; N18.9-Chronic  kidney disease, unspecified; N39.0-Urinary tract infection, site not  specified; D64.9-Anemia, unspecified; Z75.8-Other problems related to  medical facilities and other health care; Z74.09-Other reduced mobility     COMPARISON: CT 6/10/2025     FINDINGS:     Bladder is not well visualized secondary to overlying bowel gas and  decompression by Mcclain catheter.     Right kidney is 8.9 cm in length and demonstrates normal cortical  thickness and echogenicity. Moderate right-sided hydronephrosis is  present. No right-sided shadowing calculi.     Left kidney is 10.0 cm in length and demonstrates normal cortical  thickness and echogenicity. No left-sided hydronephrosis or shadowing  calculi.       Impression:      1. Moderate right-sided hydronephrosis  2. No left-sided hydronephrosis.     This report was signed and finalized on 6/11/2025 3:46 PM by Dr. Bakari Ramos MD.       CT Abdomen Pelvis Without Contrast [[id number]] Collected: 06/10/25 1505     Updated: 06/10/25 1519    Narrative:      EXAM/TECHNIQUE: CT abdomen/pelvis without contrast     INDICATION: right hydronephrosis on ultrasound; R62.7-Adult failure to  thrive; R41.82-Altered mental status, unspecified; N17.9-Acute kidney  failure, unspecified; N18.9-Chronic kidney disease, unspecified;  N39.0-Urinary tract infection, site not specified; D64.9-Anemia,  unspecified; Z75.8-Other problems related to medical facilities and  other health care; Z74.09-Other reduced  mobility     COMPARISON: 11/12/2024     DLP: 742.81 mGy.cm. Automated exposure control was utilized to decrease  patient radiation dose.     FINDINGS:     LOWER CHEST: No acute findings.     LIVER AND BILIARY: No suspicious liver lesion. The gallbladder is  surgically absent. No biliary dilatation.     PANCREAS: Unremarkable.      SPLEEN: Unremarkable.      ADRENAL: Unremarkable.     KIDNEYS/BLADDER: No large renal lesion on this noncontrast exam.  Moderate right and mild left hydronephrosis extending to the urinary  bladder. No ureteral stone. Diffuse urinary bladder wall thickening.     BOWEL: Large volume stool in the rectum with rectal wall thickening and  perirectal fat stranding. No colonic wall thickening or pericolonic fat  stranding. Normal appendix. No small bowel distention or inflammatory  change.      PERITONEUM: No ascites.      PELVIC ORGANS: Prior hysterectomy.     VASCULATURE: Atherosclerotic nonaneurysmal abdominal aorta.     LYMPH NODES: No enlarged lymph nodes.      SOFT TISSUES: Diffuse body wall soft tissue edema.     BONES: Multilevel lumbar spine degenerative change. Severe central canal  stenosis at L4-L5. Degenerative grade 1 anterolisthesis of L4 on L5.       Impression:         1.  Large volume stool in the rectum with rectal wall thickening and  perirectal fat stranding. Findings are in keeping with stercoral  colitis.     2.  Moderate right and mild left hydronephrosis extending to the urinary  bladder, without evidence of obstructing stone or mass. I suspect  hydronephrosis is related to compression by the distended stool-filled  rectum.     3.  Diffuse urinary bladder wall thickening, which can be seen in the  setting of cystitis.           This report was signed and finalized on 6/10/2025 3:16 PM by Dr. Bakari Ramos MD.       US Renal Bilateral [[id number]] Collected: 06/09/25 1644     Updated: 06/09/25 1649    Narrative:      EXAMINATION: US RENAL BILATERAL-     HISTORY: Acute  kidney injury; R62.7-Adult failure to thrive;  R41.82-Altered mental status, unspecified; N17.9-Acute kidney failure,  unspecified; N18.9-Chronic kidney disease, unspecified; N39.0-Urinary  tract infection, site not specified; D64.9-Anemia, unspecified;  Z75.8-Other problems related to medical facilities and other health care     Images are stored in PACS per institutional protocol.     Grayscale and color-flow renal ultrasound.     COMPARISON:  12/18/2024 ultrasound.  11/12/2024 CT.     Normal size and position of both kidneys.  Mildly echogenic renal parenchyma.     Moderate RIGHT hydronephrosis.  No shadowing stone.     The right kidney measures 89 x 48 x 46 mm.     The left kidney measures 100 x 46 x 48 mm.       Impression:      Impression:  1. Moderate RIGHT hydronephrosis.           This report was signed and finalized on 6/9/2025 4:46 PM by Dr. Adam Monroy MD.               Culture:  Blood Culture   Date Value Ref Range Status   06/08/2025 No growth at 24 hours  Preliminary     Urine Culture   Date Value Ref Range Status   06/07/2025 >100,000 CFU/mL Escherichia coli (A)  Final         Assessment   Acute kidney injury-obstructive  Acute cystitis  Metabolic acidosis  Anemia with iron deficiency  Dementia  Chronic kidney disease-unknown stage  Hypertension with recent hypotension    Plan:  Discussed with patient, nursing  Workup reviewed today  Monitor labs  Fluids adjustments as per orders, will plan to wean over the next 24 hours if continued improvement with Smith catheter and if patient able to maintain adequate dietary intake  Renal function improving with replaced Smith catheter  Urology evaluation reviewed as current  Defer further neurologic workup to primary service  Transfuse packed red blood cells as needed for hemoglobin less than 7      Basil Bermudez MD  6/12/2025  15:44 CDT

## 2025-06-12 NOTE — TELEPHONE ENCOUNTER
Ordered placed. Please schedule her a hospital follow up appointment for next week.     Katy Leone, DO

## 2025-06-12 NOTE — PLAN OF CARE
Goal Outcome Evaluation:  Plan of Care Reviewed With: patient        Progress: no change  Outcome Evaluation: AOx2, Pt is happy. Pt likes to take pills in puddin. VSS, mcclain cath. Pt resting well though the night. Pt positions self in bed freq. Call light within reach, bed alarm on.

## 2025-06-13 LAB
ANION GAP SERPL CALCULATED.3IONS-SCNC: 8 MMOL/L (ref 5–15)
BACTERIA SPEC AEROBE CULT: NORMAL
BUN SERPL-MCNC: 27.4 MG/DL (ref 8–23)
BUN/CREAT SERPL: 15.3 (ref 7–25)
CALCIUM SPEC-SCNC: 8.5 MG/DL (ref 8.6–10.5)
CHLORIDE SERPL-SCNC: 103 MMOL/L (ref 98–107)
CO2 SERPL-SCNC: 29 MMOL/L (ref 22–29)
CREAT SERPL-MCNC: 1.79 MG/DL (ref 0.57–1)
DEPRECATED RDW RBC AUTO: 43.2 FL (ref 37–54)
EGFRCR SERPLBLD CKD-EPI 2021: 28 ML/MIN/1.73
ERYTHROCYTE [DISTWIDTH] IN BLOOD BY AUTOMATED COUNT: 13.2 % (ref 12.3–15.4)
GLUCOSE SERPL-MCNC: 111 MG/DL (ref 65–99)
HCT VFR BLD AUTO: 22.6 % (ref 34–46.6)
HGB BLD-MCNC: 7.2 G/DL (ref 12–15.9)
MCH RBC QN AUTO: 28.5 PG (ref 26.6–33)
MCHC RBC AUTO-ENTMCNC: 31.9 G/DL (ref 31.5–35.7)
MCV RBC AUTO: 89.3 FL (ref 79–97)
PLATELET # BLD AUTO: 148 10*3/MM3 (ref 140–450)
PMV BLD AUTO: 10.8 FL (ref 6–12)
POTASSIUM SERPL-SCNC: 3.6 MMOL/L (ref 3.5–5.2)
POTASSIUM SERPL-SCNC: 3.9 MMOL/L (ref 3.5–5.2)
RBC # BLD AUTO: 2.53 10*6/MM3 (ref 3.77–5.28)
SODIUM SERPL-SCNC: 140 MMOL/L (ref 136–145)
WBC NRBC COR # BLD AUTO: 6.67 10*3/MM3 (ref 3.4–10.8)

## 2025-06-13 PROCEDURE — 99231 SBSQ HOSP IP/OBS SF/LOW 25: CPT | Performed by: UROLOGY

## 2025-06-13 PROCEDURE — 80048 BASIC METABOLIC PNL TOTAL CA: CPT | Performed by: INTERNAL MEDICINE

## 2025-06-13 PROCEDURE — 25010000002 CEFTRIAXONE PER 250 MG: Performed by: INTERNAL MEDICINE

## 2025-06-13 PROCEDURE — 85027 COMPLETE CBC AUTOMATED: CPT | Performed by: INTERNAL MEDICINE

## 2025-06-13 PROCEDURE — 84132 ASSAY OF SERUM POTASSIUM: CPT | Performed by: INTERNAL MEDICINE

## 2025-06-13 RX ORDER — POTASSIUM CHLORIDE 1500 MG/1
20 TABLET, EXTENDED RELEASE ORAL ONCE
Status: COMPLETED | OUTPATIENT
Start: 2025-06-13 | End: 2025-06-13

## 2025-06-13 RX ADMIN — DONEPEZIL HYDROCHLORIDE 10 MG: 10 TABLET, FILM COATED ORAL at 20:19

## 2025-06-13 RX ADMIN — AMLODIPINE BESYLATE 5 MG: 5 TABLET ORAL at 08:27

## 2025-06-13 RX ADMIN — DOCUSATE SODIUM 50 MG AND SENNOSIDES 8.6 MG 2 TABLET: 8.6; 5 TABLET, FILM COATED ORAL at 08:28

## 2025-06-13 RX ADMIN — CARVEDILOL 3.12 MG: 3.12 TABLET, FILM COATED ORAL at 17:39

## 2025-06-13 RX ADMIN — LEVOTHYROXINE SODIUM 50 MCG: 0.05 TABLET ORAL at 06:14

## 2025-06-13 RX ADMIN — GABAPENTIN 300 MG: 300 CAPSULE ORAL at 08:27

## 2025-06-13 RX ADMIN — Medication 10 ML: at 08:28

## 2025-06-13 RX ADMIN — GABAPENTIN 300 MG: 300 CAPSULE ORAL at 20:19

## 2025-06-13 RX ADMIN — SODIUM BICARBONATE: 84 INJECTION, SOLUTION INTRAVENOUS at 00:08

## 2025-06-13 RX ADMIN — BISACODYL 10 MG: 10 SUPPOSITORY RECTAL at 08:28

## 2025-06-13 RX ADMIN — SERTRALINE HYDROCHLORIDE 25 MG: 50 TABLET ORAL at 08:27

## 2025-06-13 RX ADMIN — CEFTRIAXONE SODIUM 1000 MG: 1 INJECTION, POWDER, FOR SOLUTION INTRAMUSCULAR; INTRAVENOUS at 12:54

## 2025-06-13 RX ADMIN — ATORVASTATIN CALCIUM 40 MG: 40 TABLET, FILM COATED ORAL at 08:27

## 2025-06-13 RX ADMIN — PANTOPRAZOLE SODIUM 40 MG: 40 TABLET, DELAYED RELEASE ORAL at 06:14

## 2025-06-13 RX ADMIN — POTASSIUM CHLORIDE 20 MEQ: 1500 TABLET, EXTENDED RELEASE ORAL at 08:28

## 2025-06-13 RX ADMIN — OXYCODONE HYDROCHLORIDE 15 MG: 5 TABLET ORAL at 13:57

## 2025-06-13 NOTE — PROGRESS NOTES
Patient Name: Jennifer Gomes  Date of Admission: 6/7/2025  Today's Date: 06/13/25  Length of Stay: 6  Primary Care Physician: Katy Leone DO    Subjective   Chief Complaint: follow-up constipation  HPI   Patient reports that she feels much better.  She has had a couple of very large bowel movements and this has made her feel much improved.  She did want to know if she is going to have to keep her catheter when she is discharged from the hospital.  It is her plan to return home with her daughter.  She voices no new complaints today.  She did report that physically she still needs a couple more days to work with physical therapy, if possible, before returning home.    Documented weights    06/07/25 1017   Weight: 60.8 kg (134 lb)          Intake/Output Summary (Last 24 hours) at 6/13/2025 1404  Last data filed at 6/13/2025 1400  Gross per 24 hour   Intake 1775.32 ml   Output 4300 ml   Net -2524.68 ml       Results for orders placed during the hospital encounter of 11/12/24    Adult Transthoracic Echo Complete W/ Cont if Necessary Per Protocol    Interpretation Summary    Left ventricular ejection fraction appears to be 66 - 70%.    Left ventricular wall thickness is consistent with mild concentric hypertrophy.    Left ventricular diastolic function is consistent with (grade Ia w/high LAP) impaired relaxation.    Left atrial volume is moderately increased.    Mild pulmonary hypertension is present.       Review of Systems   All pertinent negatives and positives are as above. All other systems have been reviewed and are negative unless otherwise stated.     Objective    Temp:  [97.7 °F (36.5 °C)-98.6 °F (37 °C)] 98.1 °F (36.7 °C)  Heart Rate:  [80-88] 82  Resp:  [16-18] 16  BP: (115-143)/(45-65) 139/59  Physical Exam  Vitals reviewed.   Constitutional:       Appearance: She is not toxic-appearing.   HENT:      Head: Normocephalic.      Mouth/Throat:      Mouth: Mucous membranes are moist.   Pulmonary:      Effort:  Pulmonary effort is normal. No respiratory distress.   Abdominal:      Palpations: Abdomen is soft.   Genitourinary:     Comments: Smith in place; normal appearing urine  Musculoskeletal:         General: No swelling.   Skin:     General: Skin is warm.   Neurological:      Mental Status: She is alert.      Motor: Weakness present.   Psychiatric:         Mood and Affect: Mood normal.         Results Review:  I have reviewed the labs, radiology results, and diagnostic studies.    Laboratory Data:   Results from last 7 days   Lab Units 06/13/25  0428 06/12/25  0824 06/11/25  0546   WBC 10*3/mm3 6.67 7.00 6.47   HEMOGLOBIN g/dL 7.2* 7.2* 7.1*   HEMATOCRIT % 22.6* 21.2* 22.4*   PLATELETS 10*3/mm3 148 126* 137*        Results from last 7 days   Lab Units 06/13/25  1203 06/13/25  0428 06/12/25  0824 06/11/25  1253 06/11/25  0546 06/10/25  0309 06/09/25  0501 06/08/25  0342 06/07/25  1255   SODIUM mmol/L  --  140 140  --  141   < > 139   < > 138   POTASSIUM mmol/L 3.9 3.6 3.7   < > 3.5   < > 4.5   < > 4.6   CHLORIDE mmol/L  --  103 107  --  108*   < > 114*   < > 106   CO2 mmol/L  --  29.0 22.0  --  23.0   < > 19.0*   < > 21.0*   BUN mg/dL  --  27.4* 25.3*  --  34.2*   < > 47.3*   < > 48.7*   CREATININE mg/dL  --  1.79* 1.89*  --  2.15*   < > 2.75*   < > 2.77*   CALCIUM mg/dL  --  8.5* 8.5*  --  8.4*   < > 8.5*   < > 9.3   BILIRUBIN mg/dL  --   --   --   --   --   --  <0.2  --  <0.2   ALK PHOS U/L  --   --   --   --   --   --  48  --  60   ALT (SGPT) U/L  --   --   --   --   --   --  <5  --  6   AST (SGOT) U/L  --   --   --   --   --   --  6  --  12   GLUCOSE mg/dL  --  111* 104*  --  110*   < > 100*   < > 103*    < > = values in this interval not displayed.       Culture Data:     Microbiology Results (last 10 days)       Procedure Component Value - Date/Time    Blood Culture With YOLA - Blood, Arm, Right [[id number]]  (Normal) Collected: 06/08/25 1043    Lab Status: Final result Specimen: Blood from Arm, Right Updated:  06/13/25 1130     Blood Culture No growth at 5 days    Urine Culture - Urine, Urine, Catheter [[id number]]  (Abnormal)  (Susceptibility) Collected: 06/07/25 1148    Lab Status: Final result Specimen: Urine, Catheter Updated: 06/09/25 0935     Urine Culture >100,000 CFU/mL Escherichia coli    Narrative:      Colonization of the urinary tract without infection is common. Treatment is discouraged unless the patient is symptomatic, pregnant, or undergoing an invasive urologic procedure.    Susceptibility        Escherichia coli      CHULA      Amoxicillin + Clavulanate Resistant      Ampicillin Resistant      Ampicillin + Sulbactam Intermediate      Cefazolin (Urine) Susceptible      Cefepime Susceptible      Ceftazidime Susceptible      Ceftriaxone Susceptible      Cefuroxime axetil Intermediate      Gentamicin Susceptible      Levofloxacin Susceptible      Nitrofurantoin Susceptible      Piperacillin + Tazobactam Susceptible      Trimethoprim + Sulfamethoxazole Susceptible                                    Radiology Data:   Imaging Results (Last 7 Days)       Procedure Component Value Units Date/Time    NM Renal With Flow & Function With Pharmacological Intervention [[id number]] Collected: 06/12/25 1435     Updated: 06/12/25 1455    Narrative:      NM RENAL W FLOW AND FUNCTION W PHARMACOLOGICAL INTERVENTION-     HISTORY: Right hydronephrosis after mcclain placement, but improved renal  function. Evaluate for possible obstructiojn; R62.7-Adult failure to  thrive; R41.82-Altered mental status, unspecified; N17.9-Acute kidney  failure, unspecified; N18.9-Chronic kidney disease, unspecified;  N39.0-Urinary tract infection, site not specified; D64.9-Anemia,  unspecified; Z75.8-Other problems related to medical facilities and     COMPARISON: CT abdomen pelvis 6/10/2025, renal ultrasound 6/11/2025     FINDINGS: Following IV injection of 9.5 mCi of technetium 99m MAG3,  dynamic posterior imaging of the abdomen and pelvis is  performed. 40 mg  of Lasix administered intravenously 20 minutes following the  pharmaceutical injection with continued dynamic imaging.     The time to peak activity within the left kidney is 4 minutes. Time to  peak activity within the right kidney is prolonged at 22 minutes.  Differential tracer uptake is equal measuring 50% on the left and 50% on  the right.      There slightly prolonged time to half emptying of tracer from the left  renal collecting system, however there is an appropriate downward curve  even before Lasix administration. Appropriate response with increased  emptying following Lasix. Findings consistent with a nonobstructive  process.     There is prolonged time to peak activity within the right kidney of 22  minutes (peak 2 minutes following the Lasix injection) with a shallow  downward curve from 22 to 40 minutes, never reaching time to half  emptying. Approximately 25% excretion of tracer from the right  kidney/proximal collecting system at 40 minutes.       Impression:      1. Abnormal right renogram with prolonged time to peak activity and a  shallow downward curve following Lasix administration with the time to  half emptying of pharmaceutical not achieved during the 40-minute exam.  With the mild downward curve following Lasix administration a distended  nonobstructive system is favored.  2. Nonobstructive left renal collecting system.  3. Differential tracer uptake is equal, 50% within each kidney.                 This report was signed and finalized on 6/12/2025 2:52 PM by Dr. Blanka Pak MD.       XR Abdomen KUB [[id number]] Collected: 06/11/25 1943     Updated: 06/11/25 1947    Narrative:      EXAMINATION: XR ABDOMEN KUB-     HISTORY: follow-up constipation; R62.7-Adult failure to thrive;  R41.82-Altered mental status, unspecified; N17.9-Acute kidney failure,  unspecified; N18.9-Chronic kidney disease, unspecified; N39.0-Urinary  tract infection, site not specified; D64.9-Anemia,  unspecified;  Z75.8-Other problems related to medical facilities and other health  care; Z74.09-Other reduced mobility     Images are stored in PACS per institutional protocol.     1 view abdomen/KUB.     Degenerative spine changes.  No bowel obstruction.  Cholecystectomy clips noted.     Prominent fecal material throughout the descending colon.     Moderate fecal material seen within the ascending colon.       Impression:      1. Moderate constipation.           This report was signed and finalized on 6/11/2025 7:44 PM by Dr. Adam Monroy MD.       US Renal Bilateral [[id number]] Collected: 06/11/25 1545     Updated: 06/11/25 1549    Narrative:      EXAM/TECHNIQUE: US RENAL BILATERAL-     INDICATION: Right hydronephrosis, reevaluate after mcclain placement;  R62.7-Adult failure to thrive; R41.82-Altered mental status,  unspecified; N17.9-Acute kidney failure, unspecified; N18.9-Chronic  kidney disease, unspecified; N39.0-Urinary tract infection, site not  specified; D64.9-Anemia, unspecified; Z75.8-Other problems related to  medical facilities and other health care; Z74.09-Other reduced mobility     COMPARISON: CT 6/10/2025     FINDINGS:     Bladder is not well visualized secondary to overlying bowel gas and  decompression by Mcclain catheter.     Right kidney is 8.9 cm in length and demonstrates normal cortical  thickness and echogenicity. Moderate right-sided hydronephrosis is  present. No right-sided shadowing calculi.     Left kidney is 10.0 cm in length and demonstrates normal cortical  thickness and echogenicity. No left-sided hydronephrosis or shadowing  calculi.       Impression:      1. Moderate right-sided hydronephrosis  2. No left-sided hydronephrosis.     This report was signed and finalized on 6/11/2025 3:46 PM by Dr. Bakari Ramos MD.       CT Abdomen Pelvis Without Contrast [[id number]] Collected: 06/10/25 1505     Updated: 06/10/25 1519    Narrative:      EXAM/TECHNIQUE: CT abdomen/pelvis without  contrast     INDICATION: right hydronephrosis on ultrasound; R62.7-Adult failure to  thrive; R41.82-Altered mental status, unspecified; N17.9-Acute kidney  failure, unspecified; N18.9-Chronic kidney disease, unspecified;  N39.0-Urinary tract infection, site not specified; D64.9-Anemia,  unspecified; Z75.8-Other problems related to medical facilities and  other health care; Z74.09-Other reduced mobility     COMPARISON: 11/12/2024     DLP: 742.81 mGy.cm. Automated exposure control was utilized to decrease  patient radiation dose.     FINDINGS:     LOWER CHEST: No acute findings.     LIVER AND BILIARY: No suspicious liver lesion. The gallbladder is  surgically absent. No biliary dilatation.     PANCREAS: Unremarkable.      SPLEEN: Unremarkable.      ADRENAL: Unremarkable.     KIDNEYS/BLADDER: No large renal lesion on this noncontrast exam.  Moderate right and mild left hydronephrosis extending to the urinary  bladder. No ureteral stone. Diffuse urinary bladder wall thickening.     BOWEL: Large volume stool in the rectum with rectal wall thickening and  perirectal fat stranding. No colonic wall thickening or pericolonic fat  stranding. Normal appendix. No small bowel distention or inflammatory  change.      PERITONEUM: No ascites.      PELVIC ORGANS: Prior hysterectomy.     VASCULATURE: Atherosclerotic nonaneurysmal abdominal aorta.     LYMPH NODES: No enlarged lymph nodes.      SOFT TISSUES: Diffuse body wall soft tissue edema.     BONES: Multilevel lumbar spine degenerative change. Severe central canal  stenosis at L4-L5. Degenerative grade 1 anterolisthesis of L4 on L5.       Impression:         1.  Large volume stool in the rectum with rectal wall thickening and  perirectal fat stranding. Findings are in keeping with stercoral  colitis.     2.  Moderate right and mild left hydronephrosis extending to the urinary  bladder, without evidence of obstructing stone or mass. I suspect  hydronephrosis is related to  compression by the distended stool-filled  rectum.     3.  Diffuse urinary bladder wall thickening, which can be seen in the  setting of cystitis.           This report was signed and finalized on 6/10/2025 3:16 PM by Dr. Bakari Ramos MD.       US Renal Bilateral [[id number]] Collected: 06/09/25 1644     Updated: 06/09/25 1649    Narrative:      EXAMINATION: US RENAL BILATERAL-     HISTORY: Acute kidney injury; R62.7-Adult failure to thrive;  R41.82-Altered mental status, unspecified; N17.9-Acute kidney failure,  unspecified; N18.9-Chronic kidney disease, unspecified; N39.0-Urinary  tract infection, site not specified; D64.9-Anemia, unspecified;  Z75.8-Other problems related to medical facilities and other health care     Images are stored in PACS per institutional protocol.     Grayscale and color-flow renal ultrasound.     COMPARISON:  12/18/2024 ultrasound.  11/12/2024 CT.     Normal size and position of both kidneys.  Mildly echogenic renal parenchyma.     Moderate RIGHT hydronephrosis.  No shadowing stone.     The right kidney measures 89 x 48 x 46 mm.     The left kidney measures 100 x 46 x 48 mm.       Impression:      Impression:  1. Moderate RIGHT hydronephrosis.           This report was signed and finalized on 6/9/2025 4:46 PM by Dr. Adam Monroy MD.       XR Chest 1 View [[id number]] Collected: 06/07/25 1205     Updated: 06/07/25 1210    Narrative:      XR CHEST 1 VW-     HISTORY: fall     COMPARISON: 12/18/2024     FINDINGS: Frontal view of the chest obtained.     Low lung volumes. Mild elevation right hemidiaphragm. Right basilar  densities favoring atelectasis. Cardiomediastinal contour is normal.  Calcified left hilar lymph nodes. No pleural effusion or pneumothorax.  No acute regional bony pathology. Right upper quadrant surgical clips.       Impression:      1. Low lung volumes with right basilar atelectasis and mild elevation  right hemidiaphragm. No pleural effusion or pneumothorax.         This report was signed and finalized on 6/7/2025 12:07 PM by Dr. Blanka Pak MD.       CT Head Without Contrast [[id number]] Collected: 06/07/25 1119     Updated: 06/07/25 1124    Narrative:      CT HEAD WO CONTRAST-      HISTORY: ams      COMPARISON: 12/18/2024     TOTAL DOSE LENGTH PRODUCT: 736.6 mGy.cm. Automated exposure control was  also utilized to decrease patient radiation dose.     TECHNIQUE: Axial images of the brain are obtained without contrast     FINDINGS: Similar moderate generalized volume loss with stable appearing  bilateral periventricular and white matter hypodensities. No  intracranial hemorrhage or mass effect. No convincing acute signs of  ischemia. No extra-axial hematoma or subarachnoid hemorrhage.     Chronic opacification right maxillary sinus. Mastoid air cells  well-aerated. Bony calvarium appears intact.       Impression:         1. No acute intracranial abnormality identified. Similar moderate  generalized volume loss with  chronic small vessel ischemic change.  Chronic opacified right maxillary sinus.     This report was signed and finalized on 6/7/2025 11:21 AM by Dr. Blanka Pak MD.                I have reviewed the patient's current medications.     amLODIPine, 5 mg, Oral, Q24H  atorvastatin, 40 mg, Oral, Daily  bisacodyl, 10 mg, Rectal, Daily  carvedilol, 3.125 mg, Oral, BID With Meals  donepezil, 10 mg, Oral, Nightly  gabapentin, 300 mg, Oral, Q12H  levothyroxine, 50 mcg, Oral, QAM  pantoprazole, 40 mg, Oral, Q AM  polyethylene glycol, 17 g, Oral, Daily  senna-docusate sodium, 2 tablet, Oral, BID  sertraline, 25 mg, Oral, Daily  sodium chloride, 10 mL, Intravenous, Q12H            Assessment/Plan   Assessment  Active Hospital Problems    Diagnosis     **Altered mental status     Generalized weakness     TOMÁS (acute kidney injury)     Hydronephrosis     Dementia     Acute UTI (urinary tract infection)     Essential hypertension     Anemia     Constipation         Treatment Plan  1.   Urology recommendations reviewed and no additional workup required during this hospitalization  2.   Keep mcclain catheter in place and plans for follow-up in Urology clinic in 4 weeks for catheter exchange and discussion of next steps  3.   IV ceftriaxone-day 7 of 7; urine culture is positive for E. coli susceptible to ceftriaxone.  Will now monitor off of Abxs  4.   Bowel regimen - much improved.  Patient will need bowel regimen on outpatient basis to prevent this from reoccurring.  5.   PT and OT  6.   IV ferric gluconate given  7.   Disposition: Based upon the conversation I had with social work/case management it is the patient/family intent for her to return home with home health services.  [facility] did offer a bed pending precertification, however family has made the decision for her to return home with home health services.  Will look into discharge home with daughter with home health services on Sunday or Monday - patient agreeable with this paln      Medical Decision Making     3 problems, acute, moderate complexity, unchanged  2 Problems, chronic, moderate complexity, unchanged     Number and Complexity of problems: 5  Differential Diagnosis: None     Conditions and Status        Condition is improving     [facility] Data  External documents reviewed: None  Cardiac tracing (EKG, telemetry) interpretation: no new EKGs  Radiology interpretation: no new radiology studies  Labs reviewed: Yes  Any tests that were considered but not ordered: None     Decision rules/scores evaluated (example [id number], Wells, etc): None     Discussed with: Patient and bedside nurse (Paola)     Care Planning  Shared decision making: Patient  Code status and discussions: No CPR  Surrogate Decision Maker spouse, Adams Gomes     Disposition  Social Determinants of Health that impact treatment or disposition: None  I expect the patient to be discharged to home with  services on Sunday or  Monday      Electronically signed by Abdirahman Schroeder MD-BC, 06/13/25, 14:04 CDT.

## 2025-06-13 NOTE — CASE MANAGEMENT/SOCIAL WORK
Continued Stay Note  [facility]     Patient Name: Jennifer Gomes  MRN: [id number]  Today's Date: 6/13/2025    Admit Date: 6/7/2025    Plan: Referral to [facility]   Discharge Plan       Row Name 06/13/25 4584       Plan    Plan Comments Plan is still for return to home with dtr when medically stable. [facility] has accepted but will need HH orders at [place].                   Discharge Codes    No documentation.                 Expected Discharge Date and Time       Expected Discharge Date Expected Discharge Time    Jun 11, 2025               MICHAEL Burt

## 2025-06-13 NOTE — PROGRESS NOTES
Urology  Length of Stay: 6  Patient Care Team:  Katy Leone DO as PCP - General (Family Medicine)  [facility]  Eleazar Ramires MD as Cardiologist (Cardiology)  Shasta Madrigal MD as Referring Physician (Obstetrics and Gynecology)  Holly Chavez MD as Consulting Physician (General Surgery)    Chief Complaint:  Hydronephrosis associated with urinary retention; urosepsis     Subjective     Interval History:   No new complaints.  Tolerating catheter well but unhappy about need for long-term Smith.  Serum creatinine improved at 1.8.  Nuclear medicine renal scan with Lasix findings of right hydronephrosis but suggestive of a nonobstructive distended system.    Review of Systems:   Review of Systems    Objective       Intake/Output Summary (Last 24 hours) at 6/13/2025 0955  Last data filed at 6/13/2025 0900  Gross per 24 hour   Intake 1775.32 ml   Output 3950 ml   Net -2174.68 ml       Vital Signs  Temp:  [97.7 °F (36.5 °C)-98.6 °F (37 °C)] 97.7 °F (36.5 °C)  Heart Rate:  [80-88] 80  Resp:  [16-18] 18  BP: (115-143)/(45-65) 134/46    Physical Exam:  Physical Exam  Constitutional:       General: She is not in acute distress.     Appearance: Normal appearance. She is not toxic-appearing.   HENT:      Head: Normocephalic and atraumatic.   Pulmonary:      Effort: Pulmonary effort is normal.   Abdominal:      General: Abdomen is flat.      Palpations: Abdomen is soft.   Genitourinary:     Comments: Urine clear yellow via Smith  Neurological:      Mental Status: She is alert.          Results Review:       I reviewed the patient's new clinical results.  Lab Results (last 24 hours)       Procedure Component Value Units Date/Time    Basic Metabolic Panel [[id number]]  (Abnormal) Collected: 06/13/25 0428    Specimen: Blood Updated: 06/13/25 0552     Glucose 111 mg/dL      BUN 27.4 mg/dL      Creatinine 1.79 mg/dL      Sodium 140 mmol/L      Potassium 3.6 mmol/L      Chloride 103 mmol/L      CO2 29.0 mmol/L       Calcium 8.5 mg/dL      BUN/Creatinine Ratio 15.3     Anion Gap 8.0 mmol/L      eGFR 28.0 mL/min/1.73     Narrative:      GFR Categories in Chronic Kidney Disease (CKD)              GFR Category          GFR (mL/min/1.73)    Interpretation  G1                    90 or greater        Normal or high (1)  G2                    60-89                Mild decrease (1)  G3a                   45-59                Mild to moderate decrease  G3b                   30-44                Moderate to severe decrease  G4                    15-29                Severe decrease  G5                    14 or less           Kidney failure    (1)In the absence of evidence of kidney disease, neither GFR category G1 or G2 fulfill the criteria for CKD.    eGFR calculation 2021 CKD-EPI creatinine equation, which does not include race as a factor    CBC (No Diff) [[id number]]  (Abnormal) Collected: 06/13/25 0428    Specimen: Blood Updated: 06/13/25 0538     WBC 6.67 10*3/mm3      RBC 2.53 10*6/mm3      Hemoglobin 7.2 g/dL      Hematocrit 22.6 %      MCV 89.3 fL      MCH 28.5 pg      MCHC 31.9 g/dL      RDW 13.2 %      RDW-SD 43.2 fl      MPV 10.8 fL      Platelets 148 10*3/mm3     Blood Culture With YOLA - Blood, Arm, Right [[id number]]  (Normal) Collected: 06/08/25 1043    Specimen: Blood from Arm, Right Updated: 06/12/25 1130     Blood Culture No growth at 4 days          Imaging Results (Last 24 Hours)       Procedure Component Value Units Date/Time    NM Renal With Flow & Function With Pharmacological Intervention [[id number]] Collected: 06/12/25 1435     Updated: 06/12/25 1455    Narrative:      NM RENAL W FLOW AND FUNCTION W PHARMACOLOGICAL INTERVENTION-     HISTORY: Right hydronephrosis after mcclain placement, but improved renal  function. Evaluate for possible obstructiojn; R62.7-Adult failure to  thrive; R41.82-Altered mental status, unspecified; N17.9-Acute kidney  failure, unspecified; N18.9-Chronic kidney disease,  unspecified;  N39.0-Urinary tract infection, site not specified; D64.9-Anemia,  unspecified; Z75.8-Other problems related to medical facilities and     COMPARISON: CT abdomen pelvis 6/10/2025, renal ultrasound 6/11/2025     FINDINGS: Following IV injection of 9.5 mCi of technetium 99m MAG3,  dynamic posterior imaging of the abdomen and pelvis is performed. 40 mg  of Lasix administered intravenously 20 minutes following the  pharmaceutical injection with continued dynamic imaging.     The time to peak activity within the left kidney is 4 minutes. Time to  peak activity within the right kidney is prolonged at 22 minutes.  Differential tracer uptake is equal measuring 50% on the left and 50% on  the right.      There slightly prolonged time to half emptying of tracer from the left  renal collecting system, however there is an appropriate downward curve  even before Lasix administration. Appropriate response with increased  emptying following Lasix. Findings consistent with a nonobstructive  process.     There is prolonged time to peak activity within the right kidney of 22  minutes (peak 2 minutes following the Lasix injection) with a shallow  downward curve from 22 to 40 minutes, never reaching time to half  emptying. Approximately 25% excretion of tracer from the right  kidney/proximal collecting system at 40 minutes.       Impression:      1. Abnormal right renogram with prolonged time to peak activity and a  shallow downward curve following Lasix administration with the time to  half emptying of pharmaceutical not achieved during the 40-minute exam.  With the mild downward curve following Lasix administration a distended  nonobstructive system is favored.  2. Nonobstructive left renal collecting system.  3. Differential tracer uptake is equal, 50% within each kidney.                 This report was signed and finalized on 6/12/2025 2:52 PM by Dr. Blanka Pak MD.               Medication Review:     Current  Facility-Administered Medications:     acetaminophen (TYLENOL) tablet 650 mg, 650 mg, Oral, Q4H PRN, 650 mg at 06/10/25 2006 **OR** acetaminophen (TYLENOL) 160 MG/5ML oral solution 650 mg, 650 mg, Oral, Q4H PRN **OR** acetaminophen (TYLENOL) suppository 650 mg, 650 mg, Rectal, Q4H PRN, Holli Menendez APRN    amLODIPine (NORVASC) tablet 5 mg, 5 mg, Oral, Q24H, Abdirahman Schoreder MD, 5 mg at 06/13/25 0827    atorvastatin (LIPITOR) tablet 40 mg, 40 mg, Oral, Daily, Holli Menendez APRN, 40 mg at 06/13/25 0827    sennosides-docusate (PERICOLACE) 8.6-50 MG per tablet 2 tablet, 2 tablet, Oral, BID, 2 tablet at 06/13/25 0828 **AND** polyethylene glycol (MIRALAX) packet 17 g, 17 g, Oral, Daily PRN **AND** bisacodyl (DULCOLAX) EC tablet 5 mg, 5 mg, Oral, Daily PRN **AND** [DISCONTINUED] bisacodyl (DULCOLAX) suppository 10 mg, 10 mg, Rectal, Daily PRN, Holli Menendez APRN    bisacodyl (DULCOLAX) suppository 10 mg, 10 mg, Rectal, Daily, Abdirahman Schroeder MD, 10 mg at 06/13/25 0828    Calcium Replacement - Follow Nurse / BPA Driven Protocol, , Not Applicable, PRN, Holli Menendez APRN    carvedilol (COREG) tablet 3.125 mg, 3.125 mg, Oral, BID With Meals, Abdirahman Schroeder MD, 3.125 mg at 06/12/25 1850    cefTRIAXone (ROCEPHIN) 1,000 mg in sodium chloride 0.9 % 100 mL MBP, 1,000 mg, Intravenous, Q24H, Abdirahman Schroeder MD, Last Rate: 200 mL/hr at 06/12/25 1456, 1,000 mg at 06/12/25 1456    donepezil (ARICEPT) tablet 10 mg, 10 mg, Oral, Nightly, Holli Menendez APRN, 10 mg at 06/12/25 2114    gabapentin (NEURONTIN) capsule 300 mg, 300 mg, Oral, Q12H, Holli Menendez APRN, 300 mg at 06/13/25 0827    levothyroxine (SYNTHROID, LEVOTHROID) tablet 50 mcg, 50 mcg, Oral, QAM, Holli Menendez APRN, 50 mcg at 06/13/25 0614    Magnesium Standard Dose Replacement - Follow Nurse / BPA Driven Protocol, , Not Applicable, PRN, Holli Menendez APRN    nitroglycerin (NITROSTAT) SL tablet 0.4 mg, 0.4 mg, Sublingual, Q5 Min  PRN, Holli Menendez, APRN    ondansetron ODT (ZOFRAN-ODT) disintegrating tablet 4 mg, 4 mg, Oral, Q6H PRN **OR** ondansetron (ZOFRAN) injection 4 mg, 4 mg, Intravenous, Q6H PRN, Holli Menendez, APRN    oxyCODONE (ROXICODONE) immediate release tablet 15 mg, 15 mg, Oral, TID PRN, Holli Menendez, APRN, 15 mg at 06/12/25 1456    pantoprazole (PROTONIX) EC tablet 40 mg, 40 mg, Oral, Q AM, Holli Menendez, APRN, 40 mg at 06/13/25 0614    Phosphorus Replacement - Follow Nurse / BPA Driven Protocol, , Not Applicable, PRN, Holli Menendez, APRN    polyethylene glycol (MIRALAX) packet 17 g, 17 g, Oral, Daily, Abdirahman Schroeder MD, 17 g at 06/11/25 1611    Potassium Replacement - Follow Nurse / BPA Driven Protocol, , Not Applicable, PRN, Holli Menendez, APRN    sertraline (ZOLOFT) tablet 25 mg, 25 mg, Oral, Daily, MenendezHolli tipton APRN, 25 mg at 06/13/25 0827    sodium chloride 0.45 % 925 mL with sodium bicarbonate 8.4 % 75 mEq infusion, , Intravenous, Continuous, Basil Bermudez MD, Last Rate: 40 mL/hr at 06/13/25 0008, New Bag at 06/13/25 0008    sodium chloride 0.9 % flush 10 mL, 10 mL, Intravenous, Q12H, Holli Menendez, APRN, 10 mL at 06/13/25 0828    sodium chloride 0.9 % flush 10 mL, 10 mL, Intravenous, PRN, Holli Menendez, APRN    sodium chloride 0.9 % infusion 40 mL, 40 mL, Intravenous, PRN, Holli Menendez L, APRN    Problem List:    Altered mental status    Constipation    Anemia    Essential hypertension    Acute UTI (urinary tract infection)    Dementia    Hydronephrosis    TOMÁS (acute kidney injury)    Generalized weakness     Assessment/Plan:   Bilateral hydronephrosis associated with incomplete bladder emptying.  Persistent right hydronephrosis appears to be nonobstructive in nature.    Urosepsis    Plan: Cleared for discharge from urologic standpoint.  Antibiotics per hospitalist.  Follow-up in office in 4 weeks for catheter exchange and further discussion of management plans.  As  needed    (Please note that portions of this note were completed with a voice recognition program.)  Rogers Cancino MD  06/13/25  09:55 CDT

## 2025-06-13 NOTE — PLAN OF CARE
Problem: Adult Inpatient Plan of Care  Goal: Plan of Care Review  Outcome: Progressing  Flowsheets (Taken 6/13/2025 0042)  Progress: no change  Outcome Evaluation: Pt A&Ox4 this shift. VSS. No c/o pain. No BMs this shift. Smith catheter to BSD. Room air. IVF infusing as ordered. Safety maintained, will continue to monitor.  Plan of Care Reviewed With: patient

## 2025-06-13 NOTE — PROGRESS NOTES
Nephrology ([facility]) Progress Note      Patient:  Jennifer Gomes  YOB: 1940  Date of Service: 6/13/2025  MRN: [id number]   Acct: [id number]   Primary Care Physician: Katy Leone DO  Advance Directive:   Code Status and Medical Interventions: No CPR (Do Not Attempt to Resuscitate); Limited Support; No intubation (DNI)   Ordered at: 06/07/25 1414     Code Status (Patient has no pulse and is not breathing):    No CPR (Do Not Attempt to Resuscitate)     Medical Interventions (Patient has pulse or is breathing):    Limited Support     Medical Intervention Limits:    No intubation (DNI)     Level Of Support Discussed With:    Health Care Surrogate     Admit Date: 6/7/2025       Hospital Day: 6  Referring Provider: No Known Provider      Patient personally seen and examined.  Complete chart including Consults, Notes, Operative Reports, Labs, Cardiology, and Radiology studies reviewed as able.        Subjective:  Jennifer Gomes is a 84 y.o. female for whom we were consulted for evaluation and treatment of acute kidney injury with a history of chronic kidney disease of unknown stage.  No prior nephrologic evaluations.  She also has a history of hypertension, GERD, fibromyalgia, depression, sleep apnea and dementia.  She presented to the emergency room with worsening mental status and was diagnosed with cystitis and acute kidney injury.  She was given IV antibiotics and fluids for treatment.    Today, no overnight events.  She denied current chest pain, shortness of air at rest, nausea or vomiting.  No family present today.  Smith catheter replaced with subsequent improvement in renal function over the next several days.    Allergies:  Ropinirole hcl, Codeine, Definity [perflutren lipid microsphere], Ambien [zolpidem], Eszopiclone, Pregabalin, and Tizanidine    Home Meds:  Medications Prior to Admission   Medication Sig Dispense Refill Last Dose/Taking    amLODIPine (NORVASC) 5  MG tablet Take 1 tablet by mouth Daily.   Taking    atorvastatin (LIPITOR) 40 MG tablet Take 1 tablet by mouth Daily.   Taking    butalbital-acetaminophen-caffeine (FIORICET, ESGIC) -40 MG per tablet Take 1 tablet by mouth Every 12 (Twelve) Hours.   Taking    carvedilol (COREG) 3.125 MG tablet Take 1 tablet by mouth 2 (Two) Times a Day With Meals.   Taking    cyclobenzaprine (FLEXERIL) 10 MG tablet Take 1 tablet by mouth Every 12 (Twelve) Hours.   Taking    donepezil (ARICEPT) 10 MG tablet Take 1 tablet by mouth Every Night.   Taking    ergocalciferol (ERGOCALCIFEROL) 36995 units capsule Take 1 capsule by mouth 1 (One) Time Per Week. Saturday   Taking    gabapentin (NEURONTIN) 300 MG capsule Take 1 capsule by mouth 2 (Two) Times a Day.   Taking    lansoprazole (PREVACID) 30 MG capsule Take 1 capsule by mouth Daily.   Taking    levothyroxine (SYNTHROID, LEVOTHROID) 50 MCG tablet Take 1 tablet by mouth Daily.   Taking    oxyCODONE (ROXICODONE) 15 MG immediate release tablet Take 1 tablet by mouth 3 times a day.   Taking    sertraline (ZOLOFT) 25 MG tablet Take 1 tablet by mouth Daily. 90 tablet 0 Taking    acetaminophen (TYLENOL) 325 MG tablet Take 2 tablets by mouth Every 6 (Six) Hours As Needed for Mild Pain.          Medicines:  Current Facility-Administered Medications   Medication Dose Route Frequency Provider Last Rate Last Admin    acetaminophen (TYLENOL) tablet 650 mg  650 mg Oral Q4H PRN Holli Menendez APRN   650 mg at 06/10/25 2006    Or    acetaminophen (TYLENOL) 160 MG/5ML oral solution 650 mg  650 mg Oral Q4H PRN Holli Menendez APRN        Or    acetaminophen (TYLENOL) suppository 650 mg  650 mg Rectal Q4H PRN Holli Menendez APRN        amLODIPine (NORVASC) tablet 5 mg  5 mg Oral Q24H Abdirahman Schroeder MD   5 mg at 06/13/25 0827    atorvastatin (LIPITOR) tablet 40 mg  40 mg Oral Daily Holli Menendez APRN   40 mg at 06/13/25 0827    sennosides-docusate (PERICOLACE) 8.6-50 MG per tablet 2  tablet  2 tablet Oral BID Abdirahman Schroeder MD   2 tablet at 06/13/25 0828    And    polyethylene glycol (MIRALAX) packet 17 g  17 g Oral Daily PRN Abdirahman Schroeder MD        And    bisacodyl (DULCOLAX) EC tablet 5 mg  5 mg Oral Daily PRN Abdirahman Schroeder MD        bisacodyl (DULCOLAX) suppository 10 mg  10 mg Rectal Daily Abdirahman Schroeder MD   10 mg at 06/13/25 0828    Calcium Replacement - Follow Nurse / BPA Driven Protocol   Not Applicable PRN Holli Menendez APRN        carvedilol (COREG) tablet 3.125 mg  3.125 mg Oral BID With Meals Abdirahman Schroeder MD   3.125 mg at 06/12/25 1850    donepezil (ARICEPT) tablet 10 mg  10 mg Oral Nightly Holli Menendez APRN   10 mg at 06/12/25 2114    gabapentin (NEURONTIN) capsule 300 mg  300 mg Oral Q12H Holli Menendez APRN   300 mg at 06/13/25 0827    levothyroxine (SYNTHROID, LEVOTHROID) tablet 50 mcg  50 mcg Oral QAM Holli Menendez APRN   50 mcg at 06/13/25 0614    Magnesium Standard Dose Replacement - Follow Nurse / BPA Driven Protocol   Not Applicable PRN Holli Menendez APRN        nitroglycerin (NITROSTAT) SL tablet 0.4 mg  0.4 mg Sublingual Q5 Min PRN Holli Menendez APRN        ondansetron ODT (ZOFRAN-ODT) disintegrating tablet 4 mg  4 mg Oral Q6H PRN Holli Menendez APRN        Or    ondansetron (ZOFRAN) injection 4 mg  4 mg Intravenous Q6H PRN Holli Menendez APRN        oxyCODONE (ROXICODONE) immediate release tablet 15 mg  15 mg Oral TID PRN Holli Menendez APRN   15 mg at 06/13/25 1357    pantoprazole (PROTONIX) EC tablet 40 mg  40 mg Oral Q AM Holli Menendez APRN   40 mg at 06/13/25 0614    Phosphorus Replacement - Follow Nurse / BPA Driven Protocol   Not Applicable PRN Holli Menendez APRN        polyethylene glycol (MIRALAX) packet 17 g  17 g Oral Daily Abdirahman Schroeder MD   17 g at 06/11/25 1611    Potassium Replacement - Follow Nurse / BPA Driven Protocol   Not Applicable PRN Holli Menendez, APRN        sertraline  (ZOLOFT) tablet 25 mg  25 mg Oral Daily Holli Menendez, APRN   25 mg at 25 0827    sodium chloride 0.45 % 925 mL with sodium bicarbonate 8.4 % 75 mEq infusion   Intravenous Continuous Basil Bermudez MD 40 mL/hr at 25 0008 New Bag at 25 0008    sodium chloride 0.9 % flush 10 mL  10 mL Intravenous Q12H MenendezHolli tipton, APRN   10 mL at 25 0828    sodium chloride 0.9 % flush 10 mL  10 mL Intravenous PRN MenendezHolli, APRN        sodium chloride 0.9 % infusion 40 mL  40 mL Intravenous PRN Holli Menendez, APRN           Past Medical History:  Past Medical History:   Diagnosis Date    Anxiety     Arthritis     Bronchitis     Cancer     uterine    Chronic pain     Depression     Disease of thyroid gland     Fibromyalgia     GERD (gastroesophageal reflux disease)     Headache     History of transfusion     AS     Hyperlipidemia     Hypertension     Incontinence     Insomnia     Leg pain     Lumbar stenosis     Migraines     Peptic ulcer     Restless legs     Sleep apnea     NO C-PAP    UTI (urinary tract infection)     Vaginal bleeding        Past Surgical History:  Past Surgical History:   Procedure Laterality Date    BLADDER REPAIR      MESH HAD TO BE REMOVED IN 2013    BREAST BIOPSY Right 2017    benign    BREAST CYST EXCISION Left     CARDIAC CATHETERIZATION      CARPAL TUNNEL RELEASE      CATARACT EXTRACTION W/ INTRAOCULAR LENS  IMPLANT, BILATERAL      CHOLECYSTECTOMY WITH INTRAOPERATIVE CHOLANGIOGRAM N/A 2023    Procedure: CHOLECYSTECTOMY LAPAROSCOPIC INTRAOPERATIVE CHOLANGIOGRAM;  Surgeon: Gerardo Arciniega MD;  Location: [facility] OR;  Service: General;  Laterality: N/A;    COLONOSCOPY      COLONOSCOPY N/A 10/01/2021    Procedure: COLONOSCOPY WITH ANESTHESIA;  Surgeon: Tom Velasco DO;  Location: [facility] ENDOSCOPY;  Service: Gastroenterology;  Laterality: N/A;  pre: change in bowel habits  post: diverticulosis. hemorrhoids.   Olivia Mora APRN         CYSTECTOMY      D & C HYSTEROSCOPY N/A 11/06/2017    Procedure: DILATATION AND CURETTAGE HYSTEROSCOPY;  Surgeon: Shasta Madrigal MD;  Location: [facility] OR;  Service:     DILATION AND CURETTAGE, DIAGNOSTIC / THERAPEUTIC  2008    ENDOSCOPY  09/23/2010    Short segment of Arriola's,Moderate chroninc esophagogastritis and negative H.pylori    ENDOSCOPY N/A 09/25/2017    Procedure: ESOPHAGOGASTRODUODENOSCOPY WITH ANESTHESIA;  Surgeon: Tom Velasco DO;  Location:  [facility] ENDOSCOPY;  Service:     EYE SURGERY      RETINA    HEMORRHOIDECTOMY SIGMOIDOSCOPY N/A 3/21/2023    Procedure: HEMORRHOIDECTOMY WITH EXAM UNDER ANESTHESIA;  Surgeon: Holly Chavez MD;  Location:  [facility] OR;  Service: General;  Laterality: N/A;    HYSTERECTOMY  12/20/2017    ORIF TIBIA/FIBULA FRACTURES Left 2000    TRANSVAGINAL TAPING SUSPENSION N/A 11/06/2017    Procedure: VAGINAL MESH REVISION;  Surgeon: Shasta Madrigal MD;  Location:  [facility] OR;  Service:     VAGINAL MESH REVISION  2013       Family History  Family History   Problem Relation Age of Onset    Diabetes Mother     Multiple myeloma Mother     Stroke Father     Diabetes Sister     Prostate cancer Brother     Lymphoma Brother         NHL    Ovarian cancer Paternal Aunt     Cancer Paternal Grandmother         metastatic    Lung cancer Paternal Grandfather     Colon cancer Neg Hx     Esophageal cancer Neg Hx     Breast cancer Neg Hx        Social History  Social History     Socioeconomic History    Marital status:    Tobacco Use    Smoking status: Never    Smokeless tobacco: Never   Vaping Use    Vaping status: Never Used   Substance and Sexual Activity    Alcohol use: Not Currently     Comment: occasional    Drug use: No    Sexual activity: Defer       Review of Systems:  History obtained from chart review and the patient  General ROS: No fever or chills  Respiratory ROS: No cough, shortness of breath, wheezing  Cardiovascular ROS: No chest pain or palpitations  Gastrointestinal  ROS: No abdominal pain or melena  Genito-Urinary ROS: No dysuria or hematuria  Psych ROS: No anxiety and depression  14 point ROS reviewed with the patient and negative except as noted above and in the HPI unless unable to obtain.    Objective:  Patient Vitals for the past 24 hrs:   BP Temp Temp src Pulse Resp SpO2   06/13/25 1500 140/54 98.3 °F (36.8 °C) Oral 83 16 98 %   06/13/25 1200 139/59 98.1 °F (36.7 °C) Oral 82 16 99 %   06/13/25 0838 134/46 97.7 °F (36.5 °C) Oral 80 18 100 %   06/13/25 0334 115/45 98.4 °F (36.9 °C) Oral 88 18 94 %   06/13/25 0034 143/50 97.8 °F (36.6 °C) Oral 86 16 97 %   06/12/25 1950 141/65 98.6 °F (37 °C) Oral 86 18 96 %       Intake/Output Summary (Last 24 hours) at 6/13/2025 1644  Last data filed at 6/13/2025 1500  Gross per 24 hour   Intake 1795.32 ml   Output 3350 ml   Net -1554.68 ml     General: awake/alert   Chest:  clear to auscultation bilaterally without respiratory distress  CVS: regular rate and rhythm  Abdominal: soft, nontender, positive bowel sounds  Extremities: no cyanosis or edema  Skin: warm and dry without rash      Labs:  Results from last 7 days   Lab Units 06/13/25  0428 06/12/25  0824 06/11/25  0546   WBC 10*3/mm3 6.67 7.00 6.47   HEMOGLOBIN g/dL 7.2* 7.2* 7.1*   HEMATOCRIT % 22.6* 21.2* 22.4*   PLATELETS 10*3/mm3 148 126* 137*         Results from last 7 days   Lab Units 06/13/25  1203 06/13/25  0428 06/12/25  0824 06/11/25  1253 06/11/25  0546 06/10/25  0309 06/09/25  0501 06/08/25  0342 06/07/25  1255   SODIUM mmol/L  --  140 140  --  141   < > 139   < > 138   POTASSIUM mmol/L 3.9 3.6 3.7   < > 3.5   < > 4.5   < > 4.6   CHLORIDE mmol/L  --  103 107  --  108*   < > 114*   < > 106   CO2 mmol/L  --  29.0 22.0  --  23.0   < > 19.0*   < > 21.0*   BUN mg/dL  --  27.4* 25.3*  --  34.2*   < > 47.3*   < > 48.7*   CREATININE mg/dL  --  1.79* 1.89*  --  2.15*   < > 2.75*   < > 2.77*   CALCIUM mg/dL  --  8.5* 8.5*  --  8.4*   < > 8.5*   < > 9.3   EGFR mL/min/1.73  --   28.0* 26.3*  --  22.5*   < > 16.7*   < > 16.6*   BILIRUBIN mg/dL  --   --   --   --   --   --  <0.2  --  <0.2   ALK PHOS U/L  --   --   --   --   --   --  48  --  60   ALT (SGPT) U/L  --   --   --   --   --   --  <5  --  6   AST (SGOT) U/L  --   --   --   --   --   --  6  --  12   GLUCOSE mg/dL  --  111* 104*  --  110*   < > 100*   < > 103*    < > = values in this interval not displayed.       Radiology:   Imaging Results (Last 72 Hours)       Procedure Component Value Units Date/Time    NM Renal With Flow & Function With Pharmacological Intervention [[id number]] Collected: 06/12/25 1435     Updated: 06/12/25 1455    Narrative:      NM RENAL W FLOW AND FUNCTION W PHARMACOLOGICAL INTERVENTION-     HISTORY: Right hydronephrosis after mcclain placement, but improved renal  function. Evaluate for possible obstructiojn; R62.7-Adult failure to  thrive; R41.82-Altered mental status, unspecified; N17.9-Acute kidney  failure, unspecified; N18.9-Chronic kidney disease, unspecified;  N39.0-Urinary tract infection, site not specified; D64.9-Anemia,  unspecified; Z75.8-Other problems related to medical facilities and     COMPARISON: CT abdomen pelvis 6/10/2025, renal ultrasound 6/11/2025     FINDINGS: Following IV injection of 9.5 mCi of technetium 99m MAG3,  dynamic posterior imaging of the abdomen and pelvis is performed. 40 mg  of Lasix administered intravenously 20 minutes following the  pharmaceutical injection with continued dynamic imaging.     The time to peak activity within the left kidney is 4 minutes. Time to  peak activity within the right kidney is prolonged at 22 minutes.  Differential tracer uptake is equal measuring 50% on the left and 50% on  the right.      There slightly prolonged time to half emptying of tracer from the left  renal collecting system, however there is an appropriate downward curve  even before Lasix administration. Appropriate response with increased  emptying following Lasix. Findings  consistent with a nonobstructive  process.     There is prolonged time to peak activity within the right kidney of 22  minutes (peak 2 minutes following the Lasix injection) with a shallow  downward curve from 22 to 40 minutes, never reaching time to half  emptying. Approximately 25% excretion of tracer from the right  kidney/proximal collecting system at 40 minutes.       Impression:      1. Abnormal right renogram with prolonged time to peak activity and a  shallow downward curve following Lasix administration with the time to  half emptying of pharmaceutical not achieved during the 40-minute exam.  With the mild downward curve following Lasix administration a distended  nonobstructive system is favored.  2. Nonobstructive left renal collecting system.  3. Differential tracer uptake is equal, 50% within each kidney.                 This report was signed and finalized on 6/12/2025 2:52 PM by Dr. Blanka Pak MD.       XR Abdomen KUB [[id number]] Collected: 06/11/25 1943     Updated: 06/11/25 1947    Narrative:      EXAMINATION: XR ABDOMEN KUB-     HISTORY: follow-up constipation; R62.7-Adult failure to thrive;  R41.82-Altered mental status, unspecified; N17.9-Acute kidney failure,  unspecified; N18.9-Chronic kidney disease, unspecified; N39.0-Urinary  tract infection, site not specified; D64.9-Anemia, unspecified;  Z75.8-Other problems related to medical facilities and other health  care; Z74.09-Other reduced mobility     Images are stored in PACS per institutional protocol.     1 view abdomen/KUB.     Degenerative spine changes.  No bowel obstruction.  Cholecystectomy clips noted.     Prominent fecal material throughout the descending colon.     Moderate fecal material seen within the ascending colon.       Impression:      1. Moderate constipation.           This report was signed and finalized on 6/11/2025 7:44 PM by Dr. Adam Monroy MD.       US Renal Bilateral [[id number]] Collected: 06/11/25 9536      Updated: 06/11/25 1549    Narrative:      EXAM/TECHNIQUE: US RENAL BILATERAL-     INDICATION: Right hydronephrosis, reevaluate after mcclain placement;  R62.7-Adult failure to thrive; R41.82-Altered mental status,  unspecified; N17.9-Acute kidney failure, unspecified; N18.9-Chronic  kidney disease, unspecified; N39.0-Urinary tract infection, site not  specified; D64.9-Anemia, unspecified; Z75.8-Other problems related to  medical facilities and other health care; Z74.09-Other reduced mobility     COMPARISON: CT 6/10/2025     FINDINGS:     Bladder is not well visualized secondary to overlying bowel gas and  decompression by Mcclain catheter.     Right kidney is 8.9 cm in length and demonstrates normal cortical  thickness and echogenicity. Moderate right-sided hydronephrosis is  present. No right-sided shadowing calculi.     Left kidney is 10.0 cm in length and demonstrates normal cortical  thickness and echogenicity. No left-sided hydronephrosis or shadowing  calculi.       Impression:      1. Moderate right-sided hydronephrosis  2. No left-sided hydronephrosis.     This report was signed and finalized on 6/11/2025 3:46 PM by Dr. Bakari Ramos MD.               Culture:  Blood Culture   Date Value Ref Range Status   06/08/2025 No growth at 24 hours  Preliminary     Urine Culture   Date Value Ref Range Status   06/07/2025 >100,000 CFU/mL Escherichia coli (A)  Final         Assessment   Acute kidney injury-obstructive  Acute cystitis  Metabolic acidosis  Anemia with iron deficiency  Dementia  Chronic kidney disease-unknown stage  Hypertension with recent hypotension    Plan:  Discussed with patient, nursing  Workup reviewed today  Monitor labs  Fluids adjustments as per orders, will plan to wean if continued improvement with Mcclain catheter and if patient able to maintain adequate dietary intake  Renal function improving with replaced Mcclain catheter  Urology evaluation reviewed as current  Deferred further neurologic  workup to primary service  Transfuse packed red blood cells as needed for hemoglobin less than 7      Basil Bermudez MD  6/13/2025  16:44 CDT

## 2025-06-13 NOTE — PLAN OF CARE
Goal Outcome Evaluation:              Outcome Evaluation: Pt AxOx4, Prn pain medication x 1 for pain w/ good results.  Smith draining at bedside.  Turned frequently.  Large BM x 3 this shift.  Call light within reach.

## 2025-06-13 NOTE — PLAN OF CARE
Goal Outcome Evaluation:  Plan of Care Reviewed With: patient        Progress: no change  Outcome Evaluation: Pt AO x3-4 on RA. Takes pills in vanilla pudding, VSS. Smith catheter in place, pt tolerating well. Pt having BMs. Mag citrate given x1 per order. IV abx given as ordered. bed alarm on. Pt turning self. call light in reach.

## 2025-06-13 NOTE — PROGRESS NOTES
Nutrition Services    Patient Name:  Jennifer Gomes  YOB: 1940  MRN: [id number]  Admit Date:  6/7/2025      Nutrition screening and chart review completed. Pt remains at low risk/no risk for malnutrition. Pt consuming 94% of 8 meals observed. Pt declined answering questions and NFPE today, will re-attempt at follow up. Continue with daily menu selections and observe food preferences. Will monitor while inpatient and follow per protocol.        Electronically signed by:  Ajay Anderson  06/13/25 11:18 CDT

## 2025-06-13 NOTE — PROGRESS NOTES
Nutrition Services    Patient Name:  Jennifer Gomes  YOB: 1940  MRN: [id number]  Admit Date:  6/7/2025    Nutrition screening and chart review completed. Pt remains at low risk/no risk for malnutrition. Pt consuming 94% of 8 meals observed. Pt declined answering questions and NFPE today, will re-attempt at follow up. Continue with daily menu selections and observe food preferences. Will monitor while inpatient and follow per protocol.         Electronically signed by:  Ajay Anderson  06/13/25 11:03 CDT

## 2025-06-14 LAB
ANION GAP SERPL CALCULATED.3IONS-SCNC: 7 MMOL/L (ref 5–15)
BUN SERPL-MCNC: 28.2 MG/DL (ref 8–23)
BUN/CREAT SERPL: 15.6 (ref 7–25)
CALCIUM SPEC-SCNC: 8.7 MG/DL (ref 8.6–10.5)
CHLORIDE SERPL-SCNC: 105 MMOL/L (ref 98–107)
CO2 SERPL-SCNC: 28 MMOL/L (ref 22–29)
CREAT SERPL-MCNC: 1.81 MG/DL (ref 0.57–1)
DEPRECATED RDW RBC AUTO: 43.3 FL (ref 37–54)
EGFRCR SERPLBLD CKD-EPI 2021: 27.7 ML/MIN/1.73
ERYTHROCYTE [DISTWIDTH] IN BLOOD BY AUTOMATED COUNT: 13.2 % (ref 12.3–15.4)
GLUCOSE SERPL-MCNC: 108 MG/DL (ref 65–99)
HCT VFR BLD AUTO: 22.6 % (ref 34–46.6)
HGB BLD-MCNC: 7.1 G/DL (ref 12–15.9)
MCH RBC QN AUTO: 28.5 PG (ref 26.6–33)
MCHC RBC AUTO-ENTMCNC: 31.4 G/DL (ref 31.5–35.7)
MCV RBC AUTO: 90.8 FL (ref 79–97)
PLATELET # BLD AUTO: 183 10*3/MM3 (ref 140–450)
PMV BLD AUTO: 10.8 FL (ref 6–12)
POTASSIUM SERPL-SCNC: 4.5 MMOL/L (ref 3.5–5.2)
RBC # BLD AUTO: 2.49 10*6/MM3 (ref 3.77–5.28)
SODIUM SERPL-SCNC: 140 MMOL/L (ref 136–145)
WBC NRBC COR # BLD AUTO: 6.34 10*3/MM3 (ref 3.4–10.8)

## 2025-06-14 PROCEDURE — 85027 COMPLETE CBC AUTOMATED: CPT | Performed by: INTERNAL MEDICINE

## 2025-06-14 PROCEDURE — 97530 THERAPEUTIC ACTIVITIES: CPT

## 2025-06-14 PROCEDURE — 80048 BASIC METABOLIC PNL TOTAL CA: CPT | Performed by: INTERNAL MEDICINE

## 2025-06-14 PROCEDURE — 97110 THERAPEUTIC EXERCISES: CPT

## 2025-06-14 RX ADMIN — ATORVASTATIN CALCIUM 40 MG: 40 TABLET, FILM COATED ORAL at 08:54

## 2025-06-14 RX ADMIN — PANTOPRAZOLE SODIUM 40 MG: 40 TABLET, DELAYED RELEASE ORAL at 05:32

## 2025-06-14 RX ADMIN — GABAPENTIN 300 MG: 300 CAPSULE ORAL at 08:54

## 2025-06-14 RX ADMIN — CARVEDILOL 3.12 MG: 3.12 TABLET, FILM COATED ORAL at 18:22

## 2025-06-14 RX ADMIN — LEVOTHYROXINE SODIUM 50 MCG: 0.05 TABLET ORAL at 05:32

## 2025-06-14 RX ADMIN — DOCUSATE SODIUM 50 MG AND SENNOSIDES 8.6 MG 2 TABLET: 8.6; 5 TABLET, FILM COATED ORAL at 08:54

## 2025-06-14 RX ADMIN — CARVEDILOL 3.12 MG: 3.12 TABLET, FILM COATED ORAL at 08:54

## 2025-06-14 RX ADMIN — DONEPEZIL HYDROCHLORIDE 10 MG: 10 TABLET, FILM COATED ORAL at 21:22

## 2025-06-14 RX ADMIN — SERTRALINE HYDROCHLORIDE 25 MG: 50 TABLET ORAL at 08:54

## 2025-06-14 RX ADMIN — GABAPENTIN 300 MG: 300 CAPSULE ORAL at 21:22

## 2025-06-14 NOTE — PLAN OF CARE
Problem: Adult Inpatient Plan of Care  Goal: Plan of Care Review  Outcome: Progressing  Flowsheets (Taken 6/14/2025 0440)  Progress: no change  Outcome Evaluation: Pt A&Ox4 this shift, VSS. Reports bottom sore from frequent incontinent BMs, barrier cream applied and encouraged to turn frequently, pt turning self. F/C to BSD, urine clear and yellow. Catheter care. Safety maintained, will continue to monitor.  Plan of Care Reviewed With: patient

## 2025-06-14 NOTE — THERAPY TREATMENT NOTE
Acute Care - Physical Therapy Treatment Note  [facility]     Patient Name: Jennifer Gomes  : 1940  MRN: [id number]  Today's Date: 2025      Visit Dx:     ICD-10-CM ICD-9-CM   1. Failure to thrive in adult  R62.7 783.7   2. Altered mental status, unspecified altered mental status type  R41.82 780.97   3. Acute renal failure superimposed on chronic kidney disease, unspecified acute renal failure type, unspecified CKD stage  N17.9 584.9    N18.9 585.9   4. Acute UTI (urinary tract infection)  N39.0 599.0   5. Anemia, unspecified type  D64.9 285.9   6. Discharge planning issues  Z75.8 V49.89   7. Impaired mobility [Z74.09]  Z74.09 799.89     Patient Active Problem List   Diagnosis    Gastroesophageal reflux disease    Spinal stenosis, lumbar region, without neurogenic claudication    Erosion of vaginal mesh    Adenocarcinoma of endometrium    S/P hysterectomy with oophorectomy    Encounter for follow-up surveillance of endometrial cancer    History of radiation therapy    Non-traumatic rhabdomyolysis    Metabolic encephalopathy    Acute renal failure superimposed on stage 3 chronic kidney disease    Medical non-compliance, does not take narcotics as prescribed    Constipation    Chronic pain syndrome    Chronic prescription opiate use    Anemia    Hypothyroidism (acquired)    Essential hypertension    Venous insufficiency of both lower extremities    Chronic intractable headache    RAFAL (obstructive sleep apnea)    Acute encephalopathy due to UTI    Toxic metabolic encephalopathy    Polypharmacy    Frequent falls    Grade III internal hemorrhoids    External hemorrhoids    Colitis    Moderate malnutrition    Transaminitis    Acute UTI (urinary tract infection)    Recurrent UTI (urinary tract infection)    Dementia    Hypokalemia    Sepsis due to urinary tract infection    Cardiopulmonary arrest    E coli bacteremia    Anemia requiring transfusions    Hydronephrosis    Acute urinary retention    TOMÁS (acute  kidney injury)    Hypercalcemia    Paroxysmal atrial fibrillation    Altered mental status    Generalized weakness     Past Medical History:   Diagnosis Date    Anxiety     Arthritis     Bronchitis     Cancer     uterine    Chronic pain     Depression     Disease of thyroid gland     Fibromyalgia     GERD (gastroesophageal reflux disease)     Headache     History of transfusion     AS     Hyperlipidemia     Hypertension     Incontinence     Insomnia     Leg pain     Lumbar stenosis     Migraines     Peptic ulcer     Restless legs     Sleep apnea     NO C-PAP    UTI (urinary tract infection)     Vaginal bleeding      Past Surgical History:   Procedure Laterality Date    BLADDER REPAIR      MESH HAD TO BE REMOVED IN 2013    BREAST BIOPSY Right 2017    benign    BREAST CYST EXCISION Left 1982    CARDIAC CATHETERIZATION      CARPAL TUNNEL RELEASE      CATARACT EXTRACTION W/ INTRAOCULAR LENS  IMPLANT, BILATERAL      CHOLECYSTECTOMY WITH INTRAOPERATIVE CHOLANGIOGRAM N/A 2023    Procedure: CHOLECYSTECTOMY LAPAROSCOPIC INTRAOPERATIVE CHOLANGIOGRAM;  Surgeon: Gerardo Arciniega MD;  Location: [facility] OR;  Service: General;  Laterality: N/A;    COLONOSCOPY      COLONOSCOPY N/A 10/01/2021    Procedure: COLONOSCOPY WITH ANESTHESIA;  Surgeon: Tom Velasco DO;  Location: [facility] ENDOSCOPY;  Service: Gastroenterology;  Laterality: N/A;  pre: change in bowel habits  post: diverticulosis. hemorrhoids.   Olivia Mora APRN        CYSTECTOMY      D & C HYSTEROSCOPY N/A 2017    Procedure: DILATATION AND CURETTAGE HYSTEROSCOPY;  Surgeon: Shasta Madrigal MD;  Location: [facility] OR;  Service:     DILATION AND CURETTAGE, DIAGNOSTIC / THERAPEUTIC      ENDOSCOPY  2010    Short segment of Arriola's,Moderate chroninc esophagogastritis and negative H.pylori    ENDOSCOPY N/A 2017    Procedure: ESOPHAGOGASTRODUODENOSCOPY WITH ANESTHESIA;  Surgeon: Tom Velasco DO;  Location: [facility] ENDOSCOPY;   Service:     EYE SURGERY      RETINA    HEMORRHOIDECTOMY SIGMOIDOSCOPY N/A 3/21/2023    Procedure: HEMORRHOIDECTOMY WITH EXAM UNDER ANESTHESIA;  Surgeon: Holly Chavez MD;  Location:  [facility] OR;  Service: General;  Laterality: N/A;    HYSTERECTOMY  12/20/2017    ORIF TIBIA/FIBULA FRACTURES Left 2000    TRANSVAGINAL TAPING SUSPENSION N/A 11/06/2017    Procedure: VAGINAL MESH REVISION;  Surgeon: Shasta Madrigal MD;  Location:  [facility] OR;  Service:     VAGINAL MESH REVISION  2013     PT Assessment (Last 12 Hours)       PT Evaluation and Treatment       Row Name 06/14/25 1325          Physical Therapy Time and Intention    Subjective Information complains of;weakness  -TB     Document Type therapy note (daily note)  -TB     Mode of Treatment physical therapy  -TB       Row Name 06/14/25 1325          General Information    Existing Precautions/Restrictions fall  -TB       Row Name 06/14/25 1325          Bed Mobility    Bed Mobility rolling left;rolling right;scooting/bridging  -TB     Rolling Left [facility] (Bed Mobility) contact guard;verbal cues  -TB     Rolling Right [employer] (Bed Mobility) contact guard;verbal cues  -TB     Scooting/Bridging [facility] (Bed Mobility) maximum assist (25% patient effort);2 person assist  Up in bed  -TB     Comment, (Bed Mobility) Rolled to get cleaned up. Declined EOB or OOB  -TB       Row Name 06/14/25 1325          Motor Skills    Comments, Therapeutic Exercise AAROM BLE exs x10  -TB       Row Name             Wound 06/10/25 2000 Right lower breast Other (Comments)    Wound - Properties Group Placement Date: 06/10/25  -CG Placement Time: 2000  -CG Side: Right  -CG Orientation: lower  -CG Location: breast  -CG Primary Wound Type: Other  -CG, excoriation     Retired Wound - Properties Group Placement Date: 06/10/25  -CG Placement Time: 2000  -CG Side: Right  -CG Orientation: lower  -CG Location: breast  -CG    Retired Wound - Properties Group Placement Date: 06/10/25  -CG  Placement Time: 2000  -CG Side: Right  -CG Orientation: lower  -CG Location: breast  -CG    Retired Wound - Properties Group Date first assessed: 06/10/25  -CG Time first assessed: 2000  -CG Side: Right  -CG Location: breast  -CG      Row Name 06/14/25 132          Positioning and Restraints    Pre-Treatment Position in bed  -TB     Post Treatment Position bed  -TB     In Bed side lying right;call light within reach;encouraged to call for assist;side rails up x2  -TB               User Key  (r) = Recorded By, (t) = Taken By, (c) = Cosigned By      Initials Name Provider Type    TB Sergey Lorenzo, PTA Physical Therapist Assistant    Tamara Zuniga RN Registered Nurse                    Physical Therapy Education       Title: PT OT SLP Therapies (In Progress)       Topic: Physical Therapy (In Progress)       Point: Mobility training (In Progress)       Learning Progress Summary            Patient Nonacceptance, E, NR,NL by AR at 6/11/2025 0751    Acceptance, E, VU,NR by AJ at 6/9/2025 1414    Comment: role of PT, high fall risk, t/f, dc planning                      Point: Home exercise program (In Progress)       Learning Progress Summary            Patient Nonacceptance, E, NR,NL by AR at 6/11/2025 0751    Acceptance, E, VU,NR by AJ at 6/9/2025 1414    Comment: role of PT, high fall risk, t/f, dc planning                      Point: Body mechanics (In Progress)       Learning Progress Summary            Patient Nonacceptance, E, NR,NL by AR at 6/11/2025 0751    Acceptance, E, VU,NR by AJ at 6/9/2025 1414    Comment: role of PT, high fall risk, t/f, dc planning                      Point: Precautions (In Progress)       Learning Progress Summary            Patient Nonacceptance, E, NR,NL by AR at 6/11/2025 0751    Acceptance, E, VU,NR by AJ at 6/9/2025 1414    Comment: role of PT, high fall risk, t/f, dc planning                                      User Key       Initials Effective Dates Name Provider  Type Discipline    AR 12/02/24 -  Cassidy Molina, RN Registered Nurse Nurse    AJ 08/15/24 -  Sunny Esquivel, PT DPT Physical Therapist PT                  PT Recommendation and Plan             Time Calculation:    PT Charges       Row Name 06/14/25 1608             Time Calculation    Start Time 1325  -TB      Stop Time 1404  -TB      Time Calculation (min) 39 min  -TB      PT Received On 06/14/25  -TB         Time Calculation- PT    Total Timed Code Minutes- PT 39 minute(s)  -TB                User Key  (r) = Recorded By, (t) = Taken By, (c) = Cosigned By      Initials Name Provider Type    TB Sergey Lorenzo PTA Physical Therapist Assistant                  Therapy Charges for Today       Code Description Service Date Service Provider Modifiers Qty    79099191344 HC PT THERAPEUTIC ACT EA 15 MIN 6/14/2025 Sergey Lorenzo PTA GP 2    82918568766 HC PT THER PROC EA 15 MIN 6/14/2025 Sergey Lorenzo PTA GP 1            PT G-Codes  Outcome Measure Options: AM-PAC 6 Clicks Basic Mobility (PT)  AM-PAC 6 Clicks Score (PT): 12    Sergey Lorenzo PTA  6/14/2025

## 2025-06-15 ENCOUNTER — READMISSION MANAGEMENT (OUTPATIENT)
Dept: CALL CENTER | Facility: HOSPITAL | Age: 85
End: 2025-06-15
Payer: MEDICARE

## 2025-06-15 VITALS
WEIGHT: 134 LBS | TEMPERATURE: 98.2 F | HEART RATE: 79 BPM | OXYGEN SATURATION: 95 % | RESPIRATION RATE: 16 BRPM | SYSTOLIC BLOOD PRESSURE: 119 MMHG | BODY MASS INDEX: 24.66 KG/M2 | HEIGHT: 62 IN | DIASTOLIC BLOOD PRESSURE: 47 MMHG

## 2025-06-15 RX ORDER — FERROUS SULFATE 325(65) MG
325 TABLET ORAL
Qty: 14 TABLET | Refills: 0 | Status: SHIPPED | OUTPATIENT
Start: 2025-06-15 | End: 2025-06-29

## 2025-06-15 RX ORDER — AMOXICILLIN 250 MG
1 CAPSULE ORAL 2 TIMES DAILY
Qty: 60 TABLET | Refills: 0 | Status: SHIPPED | OUTPATIENT
Start: 2025-06-15

## 2025-06-15 RX ADMIN — OXYCODONE HYDROCHLORIDE 15 MG: 5 TABLET ORAL at 06:10

## 2025-06-15 RX ADMIN — SERTRALINE HYDROCHLORIDE 25 MG: 50 TABLET ORAL at 08:56

## 2025-06-15 RX ADMIN — ATORVASTATIN CALCIUM 40 MG: 40 TABLET, FILM COATED ORAL at 08:56

## 2025-06-15 RX ADMIN — LEVOTHYROXINE SODIUM 50 MCG: 0.05 TABLET ORAL at 06:07

## 2025-06-15 RX ADMIN — GABAPENTIN 300 MG: 300 CAPSULE ORAL at 08:56

## 2025-06-15 RX ADMIN — PANTOPRAZOLE SODIUM 40 MG: 40 TABLET, DELAYED RELEASE ORAL at 06:07

## 2025-06-15 RX ADMIN — OXYCODONE HYDROCHLORIDE 15 MG: 5 TABLET ORAL at 13:49

## 2025-06-15 RX ADMIN — CARVEDILOL 3.12 MG: 3.12 TABLET, FILM COATED ORAL at 08:56

## 2025-06-15 NOTE — DISCHARGE SUMMARY
[facility]  DISCHARGE SUMMARY       Date of Admission: 6/7/2025  Date of Discharge:  6/15/2025  Primary Care Physician: Katy Leone DO    Presenting Problem/History of Present Illness:  Jennifer Gomes is 84-year-old who came in to the ED and was found on the floor unwitnessed by family. Patient is currently awake follows commands moving all 4 extremities and denies any complaints.      Patient has a past medical history of hypertension, hyperlipidemia, GERD, fibromyalgia, depression, migraines, sleep apnea-no CPAP, dementia and anxiety.  Patient was asleep in ER during my assessment.  Patient was very drowsy, and would not wake up for me.  She squinted her eyes when I shined a penlight in them.  Patient is very pale.  Hemoglobin is 7.8.  Contacted daughter, Lola Liang by phone, to verify patient's code status.  She said patient is a DNR-DNI.  Patient had been in a nursing home and was sent back to her home in the past 2 months.  CT of head done in ER and is negative.  ER discussed patient with the , Joselin, due to Adult Protective Services had been involved in patient's case in recently.  Will consult  to assess for discharge needs.    Final Discharge Diagnoses:  Sepsis due to UTI, resolved  Metabolic encephalopathy resolved  TOMÁS due to incomplete bladder emptying  Chronic anemia iron deficiency  Dementia    Active Hospital Problems    Diagnosis     **Altered mental status     Generalized weakness     TOMÁS (acute kidney injury)     Hydronephrosis     Dementia     Acute UTI (urinary tract infection)     Essential hypertension     Anemia     Constipation        Consults:   Dr Rogers Cancino, urology  Dr Grupo Ag, nephrology    Procedures Performed: -    Pertinent Test Results:   Results for orders placed during the hospital encounter of 11/12/24    Adult Transthoracic Echo Complete W/ Cont if Necessary Per  Protocol    Interpretation Summary    Left ventricular ejection fraction appears to be 66 - 70%.    Left ventricular wall thickness is consistent with mild concentric hypertrophy.    Left ventricular diastolic function is consistent with (grade Ia w/high LAP) impaired relaxation.    Left atrial volume is moderately increased.    Mild pulmonary hypertension is present.      Imaging Results (All)       Procedure Component Value Units Date/Time    NM Renal With Flow & Function With Pharmacological Intervention [[id number]] Collected: 06/12/25 1435     Updated: 06/12/25 1455    Narrative:      NM RENAL W FLOW AND FUNCTION W PHARMACOLOGICAL INTERVENTION-     HISTORY: Right hydronephrosis after mcclain placement, but improved renal  function. Evaluate for possible obstructiojn; R62.7-Adult failure to  thrive; R41.82-Altered mental status, unspecified; N17.9-Acute kidney  failure, unspecified; N18.9-Chronic kidney disease, unspecified;  N39.0-Urinary tract infection, site not specified; D64.9-Anemia,  unspecified; Z75.8-Other problems related to medical facilities and     COMPARISON: CT abdomen pelvis 6/10/2025, renal ultrasound 6/11/2025     FINDINGS: Following IV injection of 9.5 mCi of technetium 99m MAG3,  dynamic posterior imaging of the abdomen and pelvis is performed. 40 mg  of Lasix administered intravenously 20 minutes following the  pharmaceutical injection with continued dynamic imaging.     The time to peak activity within the left kidney is 4 minutes. Time to  peak activity within the right kidney is prolonged at 22 minutes.  Differential tracer uptake is equal measuring 50% on the left and 50% on  the right.      There slightly prolonged time to half emptying of tracer from the left  renal collecting system, however there is an appropriate downward curve  even before Lasix administration. Appropriate response with increased  emptying following Lasix. Findings consistent with a nonobstructive  process.     There  is prolonged time to peak activity within the right kidney of 22  minutes (peak 2 minutes following the Lasix injection) with a shallow  downward curve from 22 to 40 minutes, never reaching time to half  emptying. Approximately 25% excretion of tracer from the right  kidney/proximal collecting system at 40 minutes.       Impression:      1. Abnormal right renogram with prolonged time to peak activity and a  shallow downward curve following Lasix administration with the time to  half emptying of pharmaceutical not achieved during the 40-minute exam.  With the mild downward curve following Lasix administration a distended  nonobstructive system is favored.  2. Nonobstructive left renal collecting system.  3. Differential tracer uptake is equal, 50% within each kidney.                 This report was signed and finalized on 6/12/2025 2:52 PM by Dr. Blanka Pak MD.       XR Abdomen KUB [[id number]] Collected: 06/11/25 1943     Updated: 06/11/25 1947    Narrative:      EXAMINATION: XR ABDOMEN KUB-     HISTORY: follow-up constipation; R62.7-Adult failure to thrive;  R41.82-Altered mental status, unspecified; N17.9-Acute kidney failure,  unspecified; N18.9-Chronic kidney disease, unspecified; N39.0-Urinary  tract infection, site not specified; D64.9-Anemia, unspecified;  Z75.8-Other problems related to medical facilities and other health  care; Z74.09-Other reduced mobility     Images are stored in PACS per institutional protocol.     1 view abdomen/KUB.     Degenerative spine changes.  No bowel obstruction.  Cholecystectomy clips noted.     Prominent fecal material throughout the descending colon.     Moderate fecal material seen within the ascending colon.       Impression:      1. Moderate constipation.           This report was signed and finalized on 6/11/2025 7:44 PM by Dr. Adam Monroy MD.       US Renal Bilateral [[id number]] Collected: 06/11/25 1545     Updated: 06/11/25 1549    Narrative:       EXAM/TECHNIQUE: US RENAL BILATERAL-     INDICATION: Right hydronephrosis, reevaluate after mcclain placement;  R62.7-Adult failure to thrive; R41.82-Altered mental status,  unspecified; N17.9-Acute kidney failure, unspecified; N18.9-Chronic  kidney disease, unspecified; N39.0-Urinary tract infection, site not  specified; D64.9-Anemia, unspecified; Z75.8-Other problems related to  medical facilities and other health care; Z74.09-Other reduced mobility     COMPARISON: CT 6/10/2025     FINDINGS:     Bladder is not well visualized secondary to overlying bowel gas and  decompression by Mcclain catheter.     Right kidney is 8.9 cm in length and demonstrates normal cortical  thickness and echogenicity. Moderate right-sided hydronephrosis is  present. No right-sided shadowing calculi.     Left kidney is 10.0 cm in length and demonstrates normal cortical  thickness and echogenicity. No left-sided hydronephrosis or shadowing  calculi.       Impression:      1. Moderate right-sided hydronephrosis  2. No left-sided hydronephrosis.     This report was signed and finalized on 6/11/2025 3:46 PM by Dr. Bakari Ramos MD.       CT Abdomen Pelvis Without Contrast [[id number]] Collected: 06/10/25 1505     Updated: 06/10/25 1519    Narrative:      EXAM/TECHNIQUE: CT abdomen/pelvis without contrast     INDICATION: right hydronephrosis on ultrasound; R62.7-Adult failure to  thrive; R41.82-Altered mental status, unspecified; N17.9-Acute kidney  failure, unspecified; N18.9-Chronic kidney disease, unspecified;  N39.0-Urinary tract infection, site not specified; D64.9-Anemia,  unspecified; Z75.8-Other problems related to medical facilities and  other health care; Z74.09-Other reduced mobility     COMPARISON: 11/12/2024     DLP: 742.81 mGy.cm. Automated exposure control was utilized to decrease  patient radiation dose.     FINDINGS:     LOWER CHEST: No acute findings.     LIVER AND BILIARY: No suspicious liver lesion. The gallbladder  is  surgically absent. No biliary dilatation.     PANCREAS: Unremarkable.      SPLEEN: Unremarkable.      ADRENAL: Unremarkable.     KIDNEYS/BLADDER: No large renal lesion on this noncontrast exam.  Moderate right and mild left hydronephrosis extending to the urinary  bladder. No ureteral stone. Diffuse urinary bladder wall thickening.     BOWEL: Large volume stool in the rectum with rectal wall thickening and  perirectal fat stranding. No colonic wall thickening or pericolonic fat  stranding. Normal appendix. No small bowel distention or inflammatory  change.      PERITONEUM: No ascites.      PELVIC ORGANS: Prior hysterectomy.     VASCULATURE: Atherosclerotic nonaneurysmal abdominal aorta.     LYMPH NODES: No enlarged lymph nodes.      SOFT TISSUES: Diffuse body wall soft tissue edema.     BONES: Multilevel lumbar spine degenerative change. Severe central canal  stenosis at L4-L5. Degenerative grade 1 anterolisthesis of L4 on L5.       Impression:         1.  Large volume stool in the rectum with rectal wall thickening and  perirectal fat stranding. Findings are in keeping with stercoral  colitis.     2.  Moderate right and mild left hydronephrosis extending to the urinary  bladder, without evidence of obstructing stone or mass. I suspect  hydronephrosis is related to compression by the distended stool-filled  rectum.     3.  Diffuse urinary bladder wall thickening, which can be seen in the  setting of cystitis.           This report was signed and finalized on 6/10/2025 3:16 PM by Dr. Bakari Ramos MD.       US Renal Bilateral [[id number]] Collected: 06/09/25 1644     Updated: 06/09/25 1649    Narrative:      EXAMINATION: US RENAL BILATERAL-     HISTORY: Acute kidney injury; R62.7-Adult failure to thrive;  R41.82-Altered mental status, unspecified; N17.9-Acute kidney failure,  unspecified; N18.9-Chronic kidney disease, unspecified; N39.0-Urinary  tract infection, site not specified; D64.9-Anemia,  unspecified;  Z75.8-Other problems related to medical facilities and other health care     Images are stored in PACS per institutional protocol.     Grayscale and color-flow renal ultrasound.     COMPARISON:  12/18/2024 ultrasound.  11/12/2024 CT.     Normal size and position of both kidneys.  Mildly echogenic renal parenchyma.     Moderate RIGHT hydronephrosis.  No shadowing stone.     The right kidney measures 89 x 48 x 46 mm.     The left kidney measures 100 x 46 x 48 mm.       Impression:      Impression:  1. Moderate RIGHT hydronephrosis.           This report was signed and finalized on 6/9/2025 4:46 PM by Dr. Adam Monroy MD.       XR Chest 1 View [[id number]] Collected: 06/07/25 1205     Updated: 06/07/25 1210    Narrative:      XR CHEST 1 VW-     HISTORY: fall     COMPARISON: 12/18/2024     FINDINGS: Frontal view of the chest obtained.     Low lung volumes. Mild elevation right hemidiaphragm. Right basilar  densities favoring atelectasis. Cardiomediastinal contour is normal.  Calcified left hilar lymph nodes. No pleural effusion or pneumothorax.  No acute regional bony pathology. Right upper quadrant surgical clips.       Impression:      1. Low lung volumes with right basilar atelectasis and mild elevation  right hemidiaphragm. No pleural effusion or pneumothorax.        This report was signed and finalized on 6/7/2025 12:07 PM by Dr. Blanka Pak MD.       CT Head Without Contrast [[id number]] Collected: 06/07/25 1119     Updated: 06/07/25 1124    Narrative:      CT HEAD WO CONTRAST-      HISTORY: ams      COMPARISON: 12/18/2024     TOTAL DOSE LENGTH PRODUCT: 736.6 mGy.cm. Automated exposure control was  also utilized to decrease patient radiation dose.     TECHNIQUE: Axial images of the brain are obtained without contrast     FINDINGS: Similar moderate generalized volume loss with stable appearing  bilateral periventricular and white matter hypodensities. No  intracranial hemorrhage or mass effect.  No convincing acute signs of  ischemia. No extra-axial hematoma or subarachnoid hemorrhage.     Chronic opacification right maxillary sinus. Mastoid air cells  well-aerated. Bony calvarium appears intact.       Impression:         1. No acute intracranial abnormality identified. Similar moderate  generalized volume loss with  chronic small vessel ischemic change.  Chronic opacified right maxillary sinus.     This report was signed and finalized on 6/7/2025 11:21 AM by Dr. Blanka Pak MD.             LAB RESULTS:      Lab 06/14/25  0428 06/13/25  0428 06/12/25  0824 06/11/25  0546 06/10/25  0309 06/09/25  0501   WBC 6.34 6.67 7.00 6.47 8.11 5.86   HEMOGLOBIN 7.1* 7.2* 7.2* 7.1* 7.8* 7.0*   HEMATOCRIT 22.6* 22.6* 21.2* 22.4* 24.9* 23.5*   PLATELETS 183 148 126* 137* 176 165   NEUTROS ABS  --   --   --   --  6.17 3.58   IMMATURE GRANS (ABS)  --   --   --   --  0.02 0.02   LYMPHS ABS  --   --   --   --  1.22 1.50   MONOS ABS  --   --   --   --  0.52 0.51   EOS ABS  --   --   --   --  0.17 0.24   MCV 90.8 89.3 86.2 90.0 93.3 95.1         Lab 06/14/25  0428 06/13/25  1203 06/13/25  0428 06/12/25  0824 06/11/25  1253 06/11/25  0546 06/10/25  0309   SODIUM 140  --  140 140  --  141 139   POTASSIUM 4.5 3.9 3.6 3.7 3.6 3.5 4.1   CHLORIDE 105  --  103 107  --  108* 109*   CO2 28.0  --  29.0 22.0  --  23.0 19.0*   ANION GAP 7.0  --  8.0 11.0  --  10.0 11.0   BUN 28.2*  --  27.4* 25.3*  --  34.2* 41.3*   CREATININE 1.81*  --  1.79* 1.89*  --  2.15* 2.77*   EGFR 27.7*  --  28.0* 26.3*  --  22.5* 16.6*   GLUCOSE 108*  --  111* 104*  --  110* 116*   CALCIUM 8.7  --  8.5* 8.5*  --  8.4* 8.7         Lab 06/09/25  0501   TOTAL PROTEIN 5.6*   ALBUMIN 2.6*   GLOBULIN 3.0   ALT (SGPT) <5   AST (SGOT) 6   BILIRUBIN <0.2   ALK PHOS 48                     Brief Urine Lab Results  (Last result in the past 365 days)        Color   Clarity   Blood   Leuk Est   Nitrite   Protein   CREAT   Urine HCG        06/08/25 2320             75.0                Microbiology Results (last 10 days)       Procedure Component Value - Date/Time    Blood Culture With YOLA - Blood, Arm, Right [[id number]]  (Normal) Collected: 06/08/25 1043    Lab Status: Final result Specimen: Blood from Arm, Right Updated: 06/13/25 1130     Blood Culture No growth at 5 days    Urine Culture - Urine, Urine, Catheter [[id number]]  (Abnormal)  (Susceptibility) Collected: 06/07/25 1148    Lab Status: Final result Specimen: Urine, Catheter Updated: 06/09/25 0935     Urine Culture >100,000 CFU/mL Escherichia coli    Narrative:      Colonization of the urinary tract without infection is common. Treatment is discouraged unless the patient is symptomatic, pregnant, or undergoing an invasive urologic procedure.    Susceptibility        Escherichia coli      CHULA      Amoxicillin + Clavulanate Resistant      Ampicillin Resistant      Ampicillin + Sulbactam Intermediate      Cefazolin (Urine) Susceptible      Cefepime Susceptible      Ceftazidime Susceptible      Ceftriaxone Susceptible      Cefuroxime axetil Intermediate      Gentamicin Susceptible      Levofloxacin Susceptible      Nitrofurantoin Susceptible      Piperacillin + Tazobactam Susceptible      Trimethoprim + Sulfamethoxazole Susceptible                                   Hospital Course:   TOMÁS due to incomplete bladder emptying  Improved with Smith  Right hydronephrosis improving  Urology input noted.  She was evaluated with CT and nuclear medicine scan.  Consider stenting but not require, recommendations to continue Smith.     Sepsis due to UTI present on admission  Resolved  Rocephin treatment completed  No residual signs or symptoms of infection.     Encephalopathy due to above improved  Underlying dementia     Hypertension  Continue medications  Mobility limitations  Patient has home DME    Anemia, chronic, iron deficiency on panel  No bleeding noted.  Stool occult blood's are negative.  She has received iron IV and will discharge  "with oral iron.  Repeat CBC in 2 weeks.  Transfuse as needed outpatient.  Not symptomatic.    Constipation  Treated with laxatives and resolved  Will recommend Senokot twice daily at discharge    At this point patient has reached maximum benefit from hospital care.  She has good family support and wants to go home with family.  Offering home health care.  She has required DME and set up at home.  She has tolerated lunch and is very happy with her meal has moved her bowels and declares no pain.  Smith with normal urine.  She can continue outpatient follow-up.    Physical Exam on Discharge:  /47 (BP Location: Right arm, Patient Position: Lying)   Pulse 79   Temp 98.2 °F (36.8 °C) (Oral)   Resp 16   Ht 157.5 cm (62\")   Wt 60.8 kg (134 lb)   LMP  (LMP Unknown)   SpO2 95%   BMI 24.51 kg/m²   Physical Exam  Constitutional:       Appearance: She is not toxic-appearing.   HENT:      Head: Normocephalic.      Mouth/Throat:      Mouth: Mucous membranes are moist.   Pulmonary:      Effort: Pulmonary effort is normal. No respiratory distress.   Abdominal:      Palpations: Abdomen is soft.      Tenderness: There is no abdominal tenderness. There is no guarding.   Genitourinary:     Comments: Smith in place; normal appearing urine  Musculoskeletal:         General: No swelling.   Skin:     General: Skin is warm.   Neurological:      Mental Status: She is alert.      Motor: Non focal.    Psychiatric:         Mood and Affect: Mood normal.     Condition on Discharge:   stable    Discharge Disposition:  Home-Health Care [facility] [place]    Discharge Medications:     Discharge Medications        New Medications        Instructions Start Date   ferrous sulfate 325 (65 FE) MG tablet   325 mg, Oral, Daily With Breakfast      sennosides-docusate 8.6-50 MG per tablet  Commonly known as: PERICOLACE   1 tablet, Oral, 2 Times Daily             Continue These Medications        Instructions Start Date   acetaminophen 325 MG tablet  Commonly " known as: TYLENOL   650 mg, Every 6 Hours PRN      amLODIPine 5 MG tablet  Commonly known as: NORVASC   5 mg, Oral, Every 24 Hours Scheduled      atorvastatin 40 MG tablet  Commonly known as: LIPITOR   40 mg, Daily      carvedilol 3.125 MG tablet  Commonly known as: COREG   3.125 mg, Oral, 2 Times Daily With Meals      cyclobenzaprine 10 MG tablet  Commonly known as: FLEXERIL   1 tablet, Every 12 Hours Scheduled      donepezil 10 MG tablet  Commonly known as: ARICEPT   10 mg, Nightly      ergocalciferol 1.25 MG (60665 UT) capsule  Commonly known as: ERGOCALCIFEROL   50,000 Units, Weekly      gabapentin 300 MG capsule  Commonly known as: NEURONTIN   300 mg, Oral, 2 Times Daily      lansoprazole 30 MG capsule  Commonly known as: PREVACID   30 mg, Oral, Daily      levothyroxine 50 MCG tablet  Commonly known as: SYNTHROID, LEVOTHROID   50 mcg, Oral, Daily      oxyCODONE 15 MG immediate release tablet  Commonly known as: ROXICODONE   15 mg, 3 times daily      sertraline 25 MG tablet  Commonly known as: ZOLOFT   25 mg, Oral, Daily             Stop These Medications      butalbital-acetaminophen-caffeine -40 MG per tablet  Commonly known as: FIORICET, ESGIC            Discharge Diet:   Cardiac    Activity at Discharge:   Resume usual activity.  Patient uses wheelchair or walker.    Follow-up Appointments:   Future Appointments   Date Time Provider Department Center   7/9/2025  3:00 PM Katy Leone DO [facility]       Test Results Pending at Discharge: -    Electronically signed by Claudio aKuffman MD, 06/15/25, 12:44 CDT.    Time: 41 minutes.

## 2025-06-15 NOTE — PLAN OF CARE
Goal Outcome Evaluation:  Plan of Care Reviewed With: patient        Progress: improving  Outcome Evaluation: Pt A/Ox4, pleasant and cooperative. No c/o pain thus far. VSS. RA. Smith in place. BM this shift. CHG complete. Safety maintained. Call light in reach.

## 2025-06-15 NOTE — OUTREACH NOTE
Prep Survey      Flowsheet Row Responses   [facility] patient discharged from? [place]   Is LACE score < 7 ? No   Eligibility [facility]   Date of Admission 06/07/25   Date of Discharge 06/15/25   Discharge Disposition [place]Health Care [facility]   Discharge diagnosis Sepsis due to UTI,   Does the patient have one of the following disease processes/diagnoses(primary or secondary)? Sepsis   Does the patient have Home health ordered? Yes   What is the Home health agency?  [facility]   Is there a DME ordered? No   Prep survey completed? Yes            ALDO RUSSO - Registered Nurse

## 2025-06-15 NOTE — DISCHARGE PLACEMENT REQUEST
"Sunday [facility] [phone]  Jennifer Soliman (84 y.o. Female)       Date of Birth   1940    Social Security Number       Address   [address] [facility] [address]    Home Phone   [phone]    MRN   [id number]       Denominational   Adventism    Marital Status                               Admission Date   6/7/2025    Admission Type   Emergency    Admitting Provider   Claudio Kauffman MD    Attending Provider   Claudio Kauffman MD    Department, Room/Bed   [facility] 3A, 339/1       Discharge Date       Discharge Disposition   [place] [facility] [place] Destination                                 Attending Provider: Claudio Kauffman MD    Allergies: Ropinirole Hcl, Codeine, Definity [Perflutren Lipid Microsphere], Ambien [Zolpidem], Eszopiclone, Pregabalin, Tizanidine    Isolation: None   Infection: None   Code Status: No CPR    Ht: 157.5 cm (62\")   Wt: 60.8 kg (134 lb)    Admission Cmt: None   Principal Problem: Altered mental status [R41.82]                   Active Insurance as of 6/7/2025       Primary Coverage       Payor Plan Insurance Group Employer/Plan Group    [employer] MEDICARE REPLACEMENT UHC Medicare Advantage GROUP PPO [id number]       Payor Plan Address Payor Plan Phone Number Payor Plan Fax Number Effective Dates    [address] [address]   5/1/2024 - None Entered    [facility] [id number]         Subscriber Name Subscriber Birth Date Member ID       JENNIFER SOLIMAN 1940 [id number]                     Emergency Contacts        (Rel.) Home Phone Work Phone Mobile Phone    Lola Liang (Daughter) [phone] -- --    Adams Soliman (Spouse) [phone] -- --          Ambulatory Referral to Home Health [[id number]] (Order [id number])  Order  Date: 6/12/2025 Department: [facility] FAMILY MEDICINE Ordering/Authorizing: Katy Leone DO     Order History  Outpatient  Date/Time Action Taken User Additional Information "   06/12/25 0944 Sign Chris Leone DO      Order Details    Frequency Duration Priority Order Class   None None Routine Outgoing Referral     Start Date/Time    Start Date   06/12/25     Order Information    Order Date Service Start Date Start Time   06/12/25 (none) 06/12/25      Reference Links    Associated Reports   View Encounter     Order Questions    Question Answer   Face to Face Visit Date: 6/12/2025   Follow-up provider for Plan of Care? I will be treating the patient on an ongoing basis.  Please send me the Plan of Care for signature.   Follow-up provider: CHRIS LEONE   Reason/Clinical Findings weakness   Describe mobility limitations that make leaving home difficult: weakness   Nursing/Therapeutic Services Requested Physical Therapy    Occupational Therapy   PT orders: Therapeutic exercise    Strengthening   Occupational orders: Activities of daily living    Strengthening   Frequency: 1 Week 1            Source Order Set / Preference List    Preference List   AMB FAM REFERRALS [[id number]]           Clinical Indications     ICD-10-CM ICD-9-CM   Generalized weakness  - Primary    R53.1 780.79                             Reprint Order Requisition    Ambulatory Referral to Home Health (Order #[id number]) on 6/12/25         Encounter    View Encounter                Order Provider Info        Phone Pager E-mail   Ordering User  Chris Leone DO  [phone] (Office Phone) -- --   Authorizing Provider  Chris Leone DO  [phone] (Office Phone) -- --     Tracking Reports    Cosign Tracking Order Transmittal Tracking     Authorized by:  Chris Leone DO  (NPI: [id number])                Lab Component SmartPhrase Guide    Ambulatory Referral to Home Health (Order #[id number]) on 6/12/25          History & Physical        Holli Menendez APRN at 06/07/25 1418       Attestation signed by Viji James MD at 06/07/25 1901      I performed a substantive part of the MDM during the patient’s  E/M visit. I personally made or approved the documented management plan and acknowledge its risk of complications.     Labs and imaging reviewed.  Altered mental status likely 2/2 UTI.  Continue Rocephin.  Will follow cultures.  There is some possible acute kidney injury, trying fluids for now.  Will assess kidney function tomorrow.    Management/test interpretation discussed with MORGAN Major.    Electronically signed by Viji James MD, 6/7/2025, 19:00 CDT.\                            [facility] Medicine Services  HISTORY AND PHYSICAL    Date of Admission: 6/7/2025  Primary Care Physician: Katy Leone DO    Subjective   Primary Historian:  daughter, Lola    Chief Complaint:  Altered Mental Status    History of Present Illness  Jennifer Gomes is 84-year-old who came in to the ED and was found on the floor unwitnessed by family. Patient is currently awake follows commands moving all 4 extremities and denies any complaints.     Patient has a past medical history of hypertension, hyperlipidemia, GERD, fibromyalgia, depression, migraines, sleep apnea-no CPAP, dementia and anxiety.  Patient was asleep in ER during my assessment.  Patient was very drowsy, and would not wake up for me.  She squinted her eyes when I shined a penlight in them.  Patient is very pale.  Hemoglobin is 7.8.  Contacted daughter, Lola Liang by phone, to verify patient's code status.  She said patient is a DNR-DNI.  Patient had been in a nursing home and was sent back to her home in the past 2 months.  CT of head done in ER and is negative.  ER discussed patient with the , Joselin, due to Adult Protective Services had been involved in patient's case in recently.  Will consult  to assess for discharge needs.      Review of Systems   Otherwise complete ROS reviewed and negative except as mentioned in the HPI.    Past Medical History:   Past Medical History:   Diagnosis Date     Anxiety     Arthritis     Bronchitis     Cancer     uterine    Chronic pain     Depression     Disease of thyroid gland     Fibromyalgia     GERD (gastroesophageal reflux disease)     Headache     History of transfusion     AS     Hyperlipidemia     Hypertension     Incontinence     Insomnia     Leg pain     Lumbar stenosis     Migraines     Peptic ulcer     Restless legs     Sleep apnea     NO C-PAP    UTI (urinary tract infection)     Vaginal bleeding      Past Surgical History:  Past Surgical History:   Procedure Laterality Date    BLADDER REPAIR      MESH HAD TO BE REMOVED IN 2013    BREAST BIOPSY Right 2017    benign    BREAST CYST EXCISION Left 1982    CARDIAC CATHETERIZATION      CARPAL TUNNEL RELEASE      CATARACT EXTRACTION W/ INTRAOCULAR LENS  IMPLANT, BILATERAL      CHOLECYSTECTOMY WITH INTRAOPERATIVE CHOLANGIOGRAM N/A 2023    Procedure: CHOLECYSTECTOMY LAPAROSCOPIC INTRAOPERATIVE CHOLANGIOGRAM;  Surgeon: Gerardo Arciniega MD;  Location: [facility] OR;  Service: General;  Laterality: N/A;    COLONOSCOPY      COLONOSCOPY N/A 10/01/2021    Procedure: COLONOSCOPY WITH ANESTHESIA;  Surgeon: Tom Velasco DO;  Location: [facility] ENDOSCOPY;  Service: Gastroenterology;  Laterality: N/A;  pre: change in bowel habits  post: diverticulosis. hemorrhoids.   Olivia Mora, MORGAN        CYSTECTOMY      D & C HYSTEROSCOPY N/A 2017    Procedure: DILATATION AND CURETTAGE HYSTEROSCOPY;  Surgeon: Shasta Madrigal MD;  Location: [facility] OR;  Service:     DILATION AND CURETTAGE, DIAGNOSTIC / THERAPEUTIC  2008    ENDOSCOPY  2010    Short segment of Arriola's,Moderate chroninc esophagogastritis and negative H.pylori    ENDOSCOPY N/A 2017    Procedure: ESOPHAGOGASTRODUODENOSCOPY WITH ANESTHESIA;  Surgeon: Tom Velasco DO;  Location: [facility] ENDOSCOPY;  Service:     EYE SURGERY      RETINA    HEMORRHOIDECTOMY SIGMOIDOSCOPY N/A 3/21/2023    Procedure: HEMORRHOIDECTOMY WITH EXAM UNDER  ANESTHESIA;  Surgeon: Holly Chavez MD;  Location:  [facility] OR;  Service: General;  Laterality: N/A;    HYSTERECTOMY  12/20/2017    ORIF TIBIA/FIBULA FRACTURES Left 2000    TRANSVAGINAL TAPING SUSPENSION N/A 11/06/2017    Procedure: VAGINAL MESH REVISION;  Surgeon: Shasta Madrigal MD;  Location:  [facility] OR;  Service:     VAGINAL MESH REVISION  2013     Social History:  reports that she has never smoked. She has never used smokeless tobacco. She reports that she does not currently use alcohol. She reports that she does not use drugs.    Family History: family history includes Cancer in her paternal grandmother; Diabetes in her mother and sister; Lung cancer in her paternal grandfather; Lymphoma in her brother; Multiple myeloma in her mother; Ovarian cancer in her paternal aunt; Prostate cancer in her brother; Stroke in her father.       Allergies:  Allergies   Allergen Reactions    Ropinirole Hcl Shortness Of Breath    Codeine Itching and Mental Status Change    Definity [Perflutren Lipid Microsphere] Other (See Comments)     Severe back pain    Ambien [Zolpidem] Other (See Comments)     HYPER     Eszopiclone Other (See Comments)     MAKES PT HYPER     Pregabalin Dizziness    Tizanidine Other (See Comments)     Terrible nightmares       Medications:  Prior to Admission medications    Medication Sig Start Date End Date Taking? Authorizing Provider   acetaminophen (TYLENOL) 325 MG tablet Take 2 tablets by mouth Every 6 (Six) Hours As Needed for Mild Pain.    Tamiko Gaviria MD   amLODIPine (NORVASC) 5 MG tablet Take 1 tablet by mouth Daily. 4/9/25   Katy Leone,    atorvastatin (LIPITOR) 40 MG tablet Take 1 tablet by mouth Daily.    Tamiko Gaviria MD   butalbital-acetaminophen-caffeine (FIORICET, ESGIC) -40 MG per tablet Take 1 tablet by mouth Every 12 (Twelve) Hours.  Patient not taking: Reported on 4/9/2025 3/18/25   Tamiko Gaviria MD   carvedilol (COREG) 3.125 MG tablet Take 1  "tablet by mouth 2 (Two) Times a Day With Meals for 30 days. 4/9/25 5/9/25  Katy Leone DO   cyclobenzaprine (FLEXERIL) 10 MG tablet Take 1 tablet by mouth Every 12 (Twelve) Hours. 3/17/25   Tamiko Gaviria MD   donepezil (ARICEPT) 10 MG tablet Take 1 tablet by mouth Every Night. 10/18/22   Tamiko Gaviria MD   ergocalciferol (ERGOCALCIFEROL) 05361 units capsule Take 1 capsule by mouth 1 (One) Time Per Week. Saturday 4/17/19   Tamiko Gaviria MD   gabapentin (NEURONTIN) 300 MG capsule TAKE 1 CAPSULE BY MOUTH DAILY FOR 2 WEEKS, AND THEN TAKE 1 CAPSULE BY MOUTH TWICE DAILY THEREAFTER. 3/18/25   Tamiko Gaviria MD   lansoprazole (PREVACID) 30 MG capsule Take 1 capsule by mouth Daily.    Tamiko Gaviria MD   levothyroxine (SYNTHROID, LEVOTHROID) 50 MCG tablet Take 1 tablet by mouth Daily.    Tamiko Gaviria MD   naloxone (NARCAN) 4 MG/0.1ML nasal spray Administer 1 spray into the nostril(s) as directed by provider As Needed for Opioid Reversal.  Patient not taking: Reported on 4/9/2025    Tamiko Gaviria MD   oxyCODONE (ROXICODONE) 15 MG immediate release tablet TAKE 1 TABLET BY MOUTH 3 TIMES A DAY. MUST LAST 30 DAYS. 3/18/25   Tamiko Gaviria MD   sertraline (ZOLOFT) 25 MG tablet Take 1 tablet by mouth Daily. 4/9/25   Katy Leone DO     I have utilized all available immediate resources to obtain, update, or review the patient's current medications (including all prescriptions, over-the-counter products, herbals, cannabis/cannabidiol products, and vitamin/mineral/dietary (nutritional) supplements).    Objective     Vital Signs: /41 (BP Location: Right arm, Patient Position: Lying)   Pulse 73   Temp 97.6 °F (36.4 °C) (Oral)   Resp 18   Ht 157.5 cm (62\")   Wt 60.8 kg (134 lb)   LMP  (LMP Unknown)   SpO2 98%   BMI 24.51 kg/m²   Physical Exam  Constitutional:       General: She is sleeping. She is not in acute distress.     Appearance: She is " ill-appearing.   HENT:      Head: Normocephalic and atraumatic.      Nose: Nose normal.      Mouth/Throat:      Mouth: Mucous membranes are dry.      Pharynx: Oropharynx is clear.   Eyes:      Conjunctiva/sclera: Conjunctivae normal.      Pupils: Pupils are equal, round, and reactive to light.   Cardiovascular:      Rate and Rhythm: Normal rate and regular rhythm.      Pulses: Normal pulses.      Heart sounds: Normal heart sounds.   Pulmonary:      Effort: Pulmonary effort is normal.      Breath sounds: Normal breath sounds.   Abdominal:      General: Bowel sounds are normal.      Palpations: Abdomen is soft.   Musculoskeletal:         General: No swelling.      Cervical back: Normal range of motion and neck supple.      Right lower leg: No edema.      Left lower leg: No edema.   Skin:     General: Skin is warm and dry.      Capillary Refill: Capillary refill takes 2 to 3 seconds.      Coloration: Skin is pale.   Neurological:      Mental Status: Mental status is at baseline. She is lethargic.   Psychiatric:      Comments: Unable to assess        Results Reviewed:  Lab Results (last 24 hours)       Procedure Component Value Units Date/Time    Comprehensive Metabolic Panel [[id number]]  (Abnormal) Collected: 06/07/25 1255    Specimen: Blood Updated: 06/07/25 1325     Glucose 103 mg/dL      BUN 48.7 mg/dL      Creatinine 2.77 mg/dL      Sodium 138 mmol/L      Potassium 4.6 mmol/L      Chloride 106 mmol/L      CO2 21.0 mmol/L      Calcium 9.3 mg/dL      Total Protein 6.6 g/dL      Albumin 3.1 g/dL      ALT (SGPT) 6 U/L      AST (SGOT) 12 U/L      Alkaline Phosphatase 60 U/L      Total Bilirubin <0.2 mg/dL      Globulin 3.5 gm/dL      A/G Ratio 0.9 g/dL      BUN/Creatinine Ratio 17.6     Anion Gap 11.0 mmol/L      eGFR 16.6 mL/min/1.73     Narrative:      GFR Categories in Chronic Kidney Disease (CKD)              GFR Category          GFR (mL/min/1.73)    Interpretation  G1                    90 or greater        Normal  or high (1)  G2                    60-89                Mild decrease (1)  G3a                   45-59                Mild to moderate decrease  G3b                   30-44                Moderate to severe decrease  G4                    15-29                Severe decrease  G5                    14 or less           Kidney failure    (1)In the absence of evidence of kidney disease, neither GFR category G1 or G2 fulfill the criteria for CKD.    eGFR calculation 2021 CKD-EPI creatinine equation, which does not include race as a factor    CK [[id number]]  (Normal) Collected: 06/07/25 1255    Specimen: Blood Updated: 06/07/25 1325     Creatine Kinase 145 U/L     Magnesium [[id number]]  (Normal) Collected: 06/07/25 1255    Specimen: Blood Updated: 06/07/25 1323     Magnesium 2.0 mg/dL     CBC & Differential [[id number]]  (Abnormal) Collected: 06/07/25 1255    Specimen: Blood Updated: 06/07/25 1308    Narrative:      The following orders were created for panel order CBC & Differential.  Procedure                               Abnormality         Status                     ---------                               -----------         ------                     CBC Auto Differential[[id number]]        Abnormal            Final result                 Please view results for these tests on the individual orders.    CBC Auto Differential [[id number]]  (Abnormal) Collected: 06/07/25 1255    Specimen: Blood Updated: 06/07/25 1308     WBC 7.13 10*3/mm3      RBC 2.69 10*6/mm3      Hemoglobin 7.8 g/dL      Hematocrit 24.6 %      MCV 91.4 fL      MCH 29.0 pg      MCHC 31.7 g/dL      RDW 13.4 %      RDW-SD 45.0 fl      MPV 10.3 fL      Platelets 208 10*3/mm3      Neutrophil % 63.4 %      Lymphocyte % 23.4 %      Monocyte % 8.8 %      Eosinophil % 3.4 %      Basophil % 0.4 %      Immature Grans % 0.6 %      Neutrophils, Absolute 4.52 10*3/mm3      Lymphocytes, Absolute 1.67 10*3/mm3      Monocytes, Absolute 0.63 10*3/mm3      Eosinophils,  Absolute 0.24 10*3/mm3      Basophils, Absolute 0.03 10*3/mm3      Immature Grans, Absolute 0.04 10*3/mm3      nRBC 0.0 /100 WBC     Urinalysis With Culture If Indicated - Urine, Catheter [[id number]]  (Abnormal) Collected: 06/07/25 1148    Specimen: Urine, Catheter Updated: 06/07/25 1214     Color, UA Yellow     Appearance, UA Turbid     pH, UA 5.5     Specific Gravity, UA 1.016     Glucose, UA Negative     Ketones, UA Negative     Bilirubin, UA Negative     Blood, UA Moderate (2+)     Protein,  mg/dL (2+)     Leuk Esterase, UA Large (3+)     Nitrite, UA Positive     Urobilinogen, UA 0.2 E.U./dL    Narrative:      In absence of clinical symptoms, the presence of pyuria, bacteria, and/or nitrites on the urinalysis result does not correlate with infection.    Urinalysis, Microscopic Only - Urine, Catheter [[id number]]  (Abnormal) Collected: 06/07/25 1148    Specimen: Urine, Catheter Updated: 06/07/25 1214     RBC, UA None Seen /HPF      WBC, UA Too Numerous to Count /HPF      Bacteria, UA 3+ /HPF      Squamous Epithelial Cells, UA None Seen /HPF      Methodology Manual Light Microscopy    Urine Culture - Urine, Urine, Catheter [[id number]] Collected: 06/07/25 1148    Specimen: Urine, Catheter Updated: 06/07/25 1214          Imaging Results (Last 24 Hours)       Procedure Component Value Units Date/Time    XR Chest 1 View [[id number]] Collected: 06/07/25 1205     Updated: 06/07/25 1210    Narrative:      XR CHEST 1 VW-     HISTORY: fall     COMPARISON: 12/18/2024     FINDINGS: Frontal view of the chest obtained.     Low lung volumes. Mild elevation right hemidiaphragm. Right basilar  densities favoring atelectasis. Cardiomediastinal contour is normal.  Calcified left hilar lymph nodes. No pleural effusion or pneumothorax.  No acute regional bony pathology. Right upper quadrant surgical clips.       Impression:      1. Low lung volumes with right basilar atelectasis and mild elevation  right hemidiaphragm. No pleural  effusion or pneumothorax.        This report was signed and finalized on 6/7/2025 12:07 PM by Dr. Blanka Pak MD.       CT Head Without Contrast [[id number]] Collected: 06/07/25 1119     Updated: 06/07/25 1124    Narrative:      CT HEAD WO CONTRAST-      HISTORY: ams      COMPARISON: 12/18/2024     TOTAL DOSE LENGTH PRODUCT: 736.6 mGy.cm. Automated exposure control was  also utilized to decrease patient radiation dose.     TECHNIQUE: Axial images of the brain are obtained without contrast     FINDINGS: Similar moderate generalized volume loss with stable appearing  bilateral periventricular and white matter hypodensities. No  intracranial hemorrhage or mass effect. No convincing acute signs of  ischemia. No extra-axial hematoma or subarachnoid hemorrhage.     Chronic opacification right maxillary sinus. Mastoid air cells  well-aerated. Bony calvarium appears intact.       Impression:         1. No acute intracranial abnormality identified. Similar moderate  generalized volume loss with  chronic small vessel ischemic change.  Chronic opacified right maxillary sinus.     This report was signed and finalized on 6/7/2025 11:21 AM by Dr. Blanka Pak MD.                 Assessment / Plan   Assessment:   Active Hospital Problems    Diagnosis     **Altered mental status     TOMÁS (acute kidney injury)     Acute UTI (urinary tract infection)     Essential hypertension     Anemia        Treatment Plan  The patient will be admitted to Dr. James's service here at [facility].     .  1.  Altered mental status-Neurochecks every 4 hours.   consult for possible discharge back to SNF when medically stable.    2.  TOMÁS (acute kidney injury)-monitor BMP daily.  Normal saline at 75 cc/h x 2 days.  Monitor intake and output.    3.  Acute UTI-Rocephin 1000 mg IV every day x 5 days.      4.  Essential hypertension-Monitor blood pressures per hospital policy.  Adjust medications as needed.    5.   Anemia-Anemia studies ordered, type and screen.  Recheck CBC daily.  Stool for occult blood ordered.      Medical Decision Making  Altered mental status-acute, moderate complexity, unchanged  TOMÁS (acute kidney injury)-acute, moderate complexity, unchanged  Acute UTI-acute, moderate complexity, unchanged  Essential hypertension-chronic, moderate complexity, stable  Anemia-acute, moderate complexity, unchanged        Number and Complexity of problems: 5  Differential Diagnosis: none    Conditions and Status        Condition is unchanged.     [facility] Data  External documents reviewed: Epic records  Cardiac tracing (EKG, telemetry) interpretation: EKG per cardiology 6/7/2025  Radiology interpretation: CT of head, chest x-ray 6/7/2025 per radiology  Labs reviewed: CBC, CMP, magnesium, CK, urinalysis 6/7/2025  Any tests that were considered but not ordered: none     Decision rules/scores evaluated (example [id number], Wells, etc): SIRS, Sepsis, and Septic Shock Criteria - MDCalc  Calculated on Jun 07 2025 5:16 PM  This patient does not meet SIRS criteria.      Discussed with: Dona and Dr. James     Care Planning  Shared decision making: Dona and Dr. James  Code status and discussions: DNR-DNI    Disposition  Social Determinants of Health that impact treatment or disposition:  May need SNF.  Estimated length of stay is 2-3 days.     I confirmed that the patient's advanced care plan is present, code status is documented, and a surrogate decision maker is listed in the patient's medical record.     The patient's surrogate decision maker is Lola pelayo.     The patient was seen and examined by me on 06/07/2025 at 14:18.    Electronically signed by MORGNA Quintero, 06/07/25, 16:17 CDT.               Electronically signed by Viji James MD at 06/07/25 1901          Discharge Summary        Claudio Kauffman MD at 06/15/25 1244                [facility]  Medicine Services  DISCHARGE SUMMARY       Date of Admission: 6/7/2025  Date of Discharge:  6/15/2025  Primary Care Physician: Katy Leone DO    Presenting Problem/History of Present Illness:  Jennifer Gomes is 84-year-old who came in to the ED and was found on the floor unwitnessed by family. Patient is currently awake follows commands moving all 4 extremities and denies any complaints.      Patient has a past medical history of hypertension, hyperlipidemia, GERD, fibromyalgia, depression, migraines, sleep apnea-no CPAP, dementia and anxiety.  Patient was asleep in ER during my assessment.  Patient was very drowsy, and would not wake up for me.  She squinted her eyes when I shined a penlight in them.  Patient is very pale.  Hemoglobin is 7.8.  Contacted daughter, Lola Liang by phone, to verify patient's code status.  She said patient is a DNR-DNI.  Patient had been in a nursing home and was sent back to her home in the past 2 months.  CT of head done in ER and is negative.  ER discussed patient with the , Joselin, due to Adult Protective Services had been involved in patient's case in recently.  Will consult  to assess for discharge needs.    Final Discharge Diagnoses:  Sepsis due to UTI, resolved  Metabolic encephalopathy resolved  TOMÁS due to incomplete bladder emptying  Chronic anemia iron deficiency  Dementia    Active Hospital Problems    Diagnosis     **Altered mental status     Generalized weakness     TOMÁS (acute kidney injury)     Hydronephrosis     Dementia     Acute UTI (urinary tract infection)     Essential hypertension     Anemia     Constipation        Consults:   Dr Rogers Cancino, urology  Dr Grupo Ag, nephrology    Procedures Performed: -    Pertinent Test Results:   Results for orders placed during the hospital encounter of 11/12/24    Adult Transthoracic Echo Complete W/ Cont if Necessary Per Protocol    Interpretation Summary    Left ventricular ejection fraction  appears to be 66 - 70%.    Left ventricular wall thickness is consistent with mild concentric hypertrophy.    Left ventricular diastolic function is consistent with (grade Ia w/high LAP) impaired relaxation.    Left atrial volume is moderately increased.    Mild pulmonary hypertension is present.      Imaging Results (All)       Procedure Component Value Units Date/Time    NM Renal With Flow & Function With Pharmacological Intervention [[id number]] Collected: 06/12/25 1435     Updated: 06/12/25 1455    Narrative:      NM RENAL W FLOW AND FUNCTION W PHARMACOLOGICAL INTERVENTION-     HISTORY: Right hydronephrosis after mcclain placement, but improved renal  function. Evaluate for possible obstructiojn; R62.7-Adult failure to  thrive; R41.82-Altered mental status, unspecified; N17.9-Acute kidney  failure, unspecified; N18.9-Chronic kidney disease, unspecified;  N39.0-Urinary tract infection, site not specified; D64.9-Anemia,  unspecified; Z75.8-Other problems related to medical facilities and     COMPARISON: CT abdomen pelvis 6/10/2025, renal ultrasound 6/11/2025     FINDINGS: Following IV injection of 9.5 mCi of technetium 99m MAG3,  dynamic posterior imaging of the abdomen and pelvis is performed. 40 mg  of Lasix administered intravenously 20 minutes following the  pharmaceutical injection with continued dynamic imaging.     The time to peak activity within the left kidney is 4 minutes. Time to  peak activity within the right kidney is prolonged at 22 minutes.  Differential tracer uptake is equal measuring 50% on the left and 50% on  the right.      There slightly prolonged time to half emptying of tracer from the left  renal collecting system, however there is an appropriate downward curve  even before Lasix administration. Appropriate response with increased  emptying following Lasix. Findings consistent with a nonobstructive  process.     There is prolonged time to peak activity within the right kidney of 22  minutes  (peak 2 minutes following the Lasix injection) with a shallow  downward curve from 22 to 40 minutes, never reaching time to half  emptying. Approximately 25% excretion of tracer from the right  kidney/proximal collecting system at 40 minutes.       Impression:      1. Abnormal right renogram with prolonged time to peak activity and a  shallow downward curve following Lasix administration with the time to  half emptying of pharmaceutical not achieved during the 40-minute exam.  With the mild downward curve following Lasix administration a distended  nonobstructive system is favored.  2. Nonobstructive left renal collecting system.  3. Differential tracer uptake is equal, 50% within each kidney.                 This report was signed and finalized on 6/12/2025 2:52 PM by Dr. Blanka Pak MD.       XR Abdomen KUB [[id number]] Collected: 06/11/25 1943     Updated: 06/11/25 1947    Narrative:      EXAMINATION: XR ABDOMEN KUB-     HISTORY: follow-up constipation; R62.7-Adult failure to thrive;  R41.82-Altered mental status, unspecified; N17.9-Acute kidney failure,  unspecified; N18.9-Chronic kidney disease, unspecified; N39.0-Urinary  tract infection, site not specified; D64.9-Anemia, unspecified;  Z75.8-Other problems related to medical facilities and other health  care; Z74.09-Other reduced mobility     Images are stored in PACS per institutional protocol.     1 view abdomen/KUB.     Degenerative spine changes.  No bowel obstruction.  Cholecystectomy clips noted.     Prominent fecal material throughout the descending colon.     Moderate fecal material seen within the ascending colon.       Impression:      1. Moderate constipation.           This report was signed and finalized on 6/11/2025 7:44 PM by Dr. Adam Monroy MD.       US Renal Bilateral [[id number]] Collected: 06/11/25 1545     Updated: 06/11/25 1549    Narrative:      EXAM/TECHNIQUE: US RENAL BILATERAL-     INDICATION: Right hydronephrosis, reevaluate  after mcclain placement;  R62.7-Adult failure to thrive; R41.82-Altered mental status,  unspecified; N17.9-Acute kidney failure, unspecified; N18.9-Chronic  kidney disease, unspecified; N39.0-Urinary tract infection, site not  specified; D64.9-Anemia, unspecified; Z75.8-Other problems related to  medical facilities and other health care; Z74.09-Other reduced mobility     COMPARISON: CT 6/10/2025     FINDINGS:     Bladder is not well visualized secondary to overlying bowel gas and  decompression by Mcclain catheter.     Right kidney is 8.9 cm in length and demonstrates normal cortical  thickness and echogenicity. Moderate right-sided hydronephrosis is  present. No right-sided shadowing calculi.     Left kidney is 10.0 cm in length and demonstrates normal cortical  thickness and echogenicity. No left-sided hydronephrosis or shadowing  calculi.       Impression:      1. Moderate right-sided hydronephrosis  2. No left-sided hydronephrosis.     This report was signed and finalized on 6/11/2025 3:46 PM by Dr. Bakari Ramos MD.       CT Abdomen Pelvis Without Contrast [[id number]] Collected: 06/10/25 1505     Updated: 06/10/25 1519    Narrative:      EXAM/TECHNIQUE: CT abdomen/pelvis without contrast     INDICATION: right hydronephrosis on ultrasound; R62.7-Adult failure to  thrive; R41.82-Altered mental status, unspecified; N17.9-Acute kidney  failure, unspecified; N18.9-Chronic kidney disease, unspecified;  N39.0-Urinary tract infection, site not specified; D64.9-Anemia,  unspecified; Z75.8-Other problems related to medical facilities and  other health care; Z74.09-Other reduced mobility     COMPARISON: 11/12/2024     DLP: 742.81 mGy.cm. Automated exposure control was utilized to decrease  patient radiation dose.     FINDINGS:     LOWER CHEST: No acute findings.     LIVER AND BILIARY: No suspicious liver lesion. The gallbladder is  surgically absent. No biliary dilatation.     PANCREAS: Unremarkable.      SPLEEN:  Unremarkable.      ADRENAL: Unremarkable.     KIDNEYS/BLADDER: No large renal lesion on this noncontrast exam.  Moderate right and mild left hydronephrosis extending to the urinary  bladder. No ureteral stone. Diffuse urinary bladder wall thickening.     BOWEL: Large volume stool in the rectum with rectal wall thickening and  perirectal fat stranding. No colonic wall thickening or pericolonic fat  stranding. Normal appendix. No small bowel distention or inflammatory  change.      PERITONEUM: No ascites.      PELVIC ORGANS: Prior hysterectomy.     VASCULATURE: Atherosclerotic nonaneurysmal abdominal aorta.     LYMPH NODES: No enlarged lymph nodes.      SOFT TISSUES: Diffuse body wall soft tissue edema.     BONES: Multilevel lumbar spine degenerative change. Severe central canal  stenosis at L4-L5. Degenerative grade 1 anterolisthesis of L4 on L5.       Impression:         1.  Large volume stool in the rectum with rectal wall thickening and  perirectal fat stranding. Findings are in keeping with stercoral  colitis.     2.  Moderate right and mild left hydronephrosis extending to the urinary  bladder, without evidence of obstructing stone or mass. I suspect  hydronephrosis is related to compression by the distended stool-filled  rectum.     3.  Diffuse urinary bladder wall thickening, which can be seen in the  setting of cystitis.           This report was signed and finalized on 6/10/2025 3:16 PM by Dr. Bakari Ramos MD.       US Renal Bilateral [[id number]] Collected: 06/09/25 1644     Updated: 06/09/25 1649    Narrative:      EXAMINATION: US RENAL BILATERAL-     HISTORY: Acute kidney injury; R62.7-Adult failure to thrive;  R41.82-Altered mental status, unspecified; N17.9-Acute kidney failure,  unspecified; N18.9-Chronic kidney disease, unspecified; N39.0-Urinary  tract infection, site not specified; D64.9-Anemia, unspecified;  Z75.8-Other problems related to medical facilities and other health care     Images are  stored in PACS per institutional protocol.     Grayscale and color-flow renal ultrasound.     COMPARISON:  12/18/2024 ultrasound.  11/12/2024 CT.     Normal size and position of both kidneys.  Mildly echogenic renal parenchyma.     Moderate RIGHT hydronephrosis.  No shadowing stone.     The right kidney measures 89 x 48 x 46 mm.     The left kidney measures 100 x 46 x 48 mm.       Impression:      Impression:  1. Moderate RIGHT hydronephrosis.           This report was signed and finalized on 6/9/2025 4:46 PM by Dr. Adam Monroy MD.       XR Chest 1 View [[id number]] Collected: 06/07/25 1205     Updated: 06/07/25 1210    Narrative:      XR CHEST 1 VW-     HISTORY: fall     COMPARISON: 12/18/2024     FINDINGS: Frontal view of the chest obtained.     Low lung volumes. Mild elevation right hemidiaphragm. Right basilar  densities favoring atelectasis. Cardiomediastinal contour is normal.  Calcified left hilar lymph nodes. No pleural effusion or pneumothorax.  No acute regional bony pathology. Right upper quadrant surgical clips.       Impression:      1. Low lung volumes with right basilar atelectasis and mild elevation  right hemidiaphragm. No pleural effusion or pneumothorax.        This report was signed and finalized on 6/7/2025 12:07 PM by Dr. Blanka Pak MD.       CT Head Without Contrast [[id number]] Collected: 06/07/25 1119     Updated: 06/07/25 1124    Narrative:      CT HEAD WO CONTRAST-      HISTORY: ams      COMPARISON: 12/18/2024     TOTAL DOSE LENGTH PRODUCT: 736.6 mGy.cm. Automated exposure control was  also utilized to decrease patient radiation dose.     TECHNIQUE: Axial images of the brain are obtained without contrast     FINDINGS: Similar moderate generalized volume loss with stable appearing  bilateral periventricular and white matter hypodensities. No  intracranial hemorrhage or mass effect. No convincing acute signs of  ischemia. No extra-axial hematoma or subarachnoid hemorrhage.     Chronic  opacification right maxillary sinus. Mastoid air cells  well-aerated. Bony calvarium appears intact.       Impression:         1. No acute intracranial abnormality identified. Similar moderate  generalized volume loss with  chronic small vessel ischemic change.  Chronic opacified right maxillary sinus.     This report was signed and finalized on 6/7/2025 11:21 AM by Dr. Blanka Pak MD.             LAB RESULTS:      Lab 06/14/25  0428 06/13/25  0428 06/12/25  0824 06/11/25  0546 06/10/25  0309 06/09/25  0501   WBC 6.34 6.67 7.00 6.47 8.11 5.86   HEMOGLOBIN 7.1* 7.2* 7.2* 7.1* 7.8* 7.0*   HEMATOCRIT 22.6* 22.6* 21.2* 22.4* 24.9* 23.5*   PLATELETS 183 148 126* 137* 176 165   NEUTROS ABS  --   --   --   --  6.17 3.58   IMMATURE GRANS (ABS)  --   --   --   --  0.02 0.02   LYMPHS ABS  --   --   --   --  1.22 1.50   MONOS ABS  --   --   --   --  0.52 0.51   EOS ABS  --   --   --   --  0.17 0.24   MCV 90.8 89.3 86.2 90.0 93.3 95.1         Lab 06/14/25  0428 06/13/25  1203 06/13/25  0428 06/12/25  0824 06/11/25  1253 06/11/25  0546 06/10/25  0309   SODIUM 140  --  140 140  --  141 139   POTASSIUM 4.5 3.9 3.6 3.7 3.6 3.5 4.1   CHLORIDE 105  --  103 107  --  108* 109*   CO2 28.0  --  29.0 22.0  --  23.0 19.0*   ANION GAP 7.0  --  8.0 11.0  --  10.0 11.0   BUN 28.2*  --  27.4* 25.3*  --  34.2* 41.3*   CREATININE 1.81*  --  1.79* 1.89*  --  2.15* 2.77*   EGFR 27.7*  --  28.0* 26.3*  --  22.5* 16.6*   GLUCOSE 108*  --  111* 104*  --  110* 116*   CALCIUM 8.7  --  8.5* 8.5*  --  8.4* 8.7         Lab 06/09/25  0501   TOTAL PROTEIN 5.6*   ALBUMIN 2.6*   GLOBULIN 3.0   ALT (SGPT) <5   AST (SGOT) 6   BILIRUBIN <0.2   ALK PHOS 48                     Brief Urine Lab Results  (Last result in the past 365 days)        Color   Clarity   Blood   Leuk Est   Nitrite   Protein   CREAT   Urine HCG        06/08/25 2320             75.0               Microbiology Results (last 10 days)       Procedure Component Value - Date/Time    Blood  Culture With YOLA - Blood, Arm, Right [[id number]]  (Normal) Collected: 06/08/25 1043    Lab Status: Final result Specimen: Blood from Arm, Right Updated: 06/13/25 1130     Blood Culture No growth at 5 days    Urine Culture - Urine, Urine, Catheter [[id number]]  (Abnormal)  (Susceptibility) Collected: 06/07/25 1148    Lab Status: Final result Specimen: Urine, Catheter Updated: 06/09/25 0935     Urine Culture >100,000 CFU/mL Escherichia coli    Narrative:      Colonization of the urinary tract without infection is common. Treatment is discouraged unless the patient is symptomatic, pregnant, or undergoing an invasive urologic procedure.    Susceptibility        Escherichia coli      CHULA      Amoxicillin + Clavulanate Resistant      Ampicillin Resistant      Ampicillin + Sulbactam Intermediate      Cefazolin (Urine) Susceptible      Cefepime Susceptible      Ceftazidime Susceptible      Ceftriaxone Susceptible      Cefuroxime axetil Intermediate      Gentamicin Susceptible      Levofloxacin Susceptible      Nitrofurantoin Susceptible      Piperacillin + Tazobactam Susceptible      Trimethoprim + Sulfamethoxazole Susceptible                                   Hospital Course:   TOMÁS due to incomplete bladder emptying  Improved with Smith  Right hydronephrosis improving  Urology input noted.  She was evaluated with CT and nuclear medicine scan.  Consider stenting but not require, recommendations to continue Smith.     Sepsis due to UTI present on admission  Resolved  Rocephin treatment completed  No residual signs or symptoms of infection.     Encephalopathy due to above improved  Underlying dementia     Hypertension  Continue medications  Mobility limitations  Patient has home DME    Anemia, chronic, iron deficiency on panel  No bleeding noted.  Stool occult blood's are negative.  She has received iron IV and will discharge with oral iron.  Repeat CBC in 2 weeks.  Transfuse as needed outpatient.  Not  "symptomatic.    Constipation  Treated with laxatives and resolved  Will recommend Senokot twice daily at discharge    At this point patient has reached maximum benefit from hospital care.  She has good family support and wants to go home with family.  Offering home health care.  She has required DME and set up at home.  She has tolerated lunch and is very happy with her meal has moved her bowels and declares no pain.  Smith with normal urine.  She can continue outpatient follow-up.    Physical Exam on Discharge:  /47 (BP Location: Right arm, Patient Position: Lying)   Pulse 79   Temp 98.2 °F (36.8 °C) (Oral)   Resp 16   Ht 157.5 cm (62\")   Wt 60.8 kg (134 lb)   LMP  (LMP Unknown)   SpO2 95%   BMI 24.51 kg/m²   Physical Exam  Constitutional:       Appearance: She is not toxic-appearing.   HENT:      Head: Normocephalic.      Mouth/Throat:      Mouth: Mucous membranes are moist.   Pulmonary:      Effort: Pulmonary effort is normal. No respiratory distress.   Abdominal:      Palpations: Abdomen is soft.      Tenderness: There is no abdominal tenderness. There is no guarding.   Genitourinary:     Comments: Smith in place; normal appearing urine  Musculoskeletal:         General: No swelling.   Skin:     General: Skin is warm.   Neurological:      Mental Status: She is alert.      Motor: Non focal.    Psychiatric:         Mood and Affect: Mood normal.     Condition on Discharge:   stable    Discharge Disposition:  Home-Health Care [facility]    Discharge Medications:     Discharge Medications        New Medications        Instructions Start Date   ferrous sulfate 325 (65 FE) MG tablet   325 mg, Oral, Daily With Breakfast      sennosides-docusate 8.6-50 MG per tablet  Commonly known as: PERICOLACE   1 tablet, Oral, 2 Times Daily             Continue These Medications        Instructions Start Date   acetaminophen 325 MG tablet  Commonly known as: TYLENOL   650 mg, Every 6 Hours PRN      amLODIPine 5 MG " tablet  Commonly known as: NORVASC   5 mg, Oral, Every 24 Hours Scheduled      atorvastatin 40 MG tablet  Commonly known as: LIPITOR   40 mg, Daily      carvedilol 3.125 MG tablet  Commonly known as: COREG   3.125 mg, Oral, 2 Times Daily With Meals      cyclobenzaprine 10 MG tablet  Commonly known as: FLEXERIL   1 tablet, Every 12 Hours Scheduled      donepezil 10 MG tablet  Commonly known as: ARICEPT   10 mg, Nightly      ergocalciferol 1.25 MG (81052 UT) capsule  Commonly known as: ERGOCALCIFEROL   50,000 Units, Weekly      gabapentin 300 MG capsule  Commonly known as: NEURONTIN   300 mg, Oral, 2 Times Daily      lansoprazole 30 MG capsule  Commonly known as: PREVACID   30 mg, Oral, Daily      levothyroxine 50 MCG tablet  Commonly known as: SYNTHROID, LEVOTHROID   50 mcg, Oral, Daily      oxyCODONE 15 MG immediate release tablet  Commonly known as: ROXICODONE   15 mg, 3 times daily      sertraline 25 MG tablet  Commonly known as: ZOLOFT   25 mg, Oral, Daily             Stop These Medications      butalbital-acetaminophen-caffeine -40 MG per tablet  Commonly known as: FIORICET, ESGIC            Discharge Diet:   Cardiac    Activity at Discharge:   Resume usual activity.  Patient uses wheelchair or walker.    Follow-up Appointments:   Future Appointments   Date Time Provider Department Center   7/9/2025  3:00 PM Katy Leone DO [facility]       Test Results Pending at Discharge: -    Electronically signed by Claudio Kauffman MD, 06/15/25, 12:44 CDT.    Time: 41 minutes.         Electronically signed by Claudio Kauffman MD at 06/15/25 9245

## 2025-06-15 NOTE — CASE MANAGEMENT/SOCIAL WORK
Continued Stay Note   [place]     Patient Name: Jennifer Gomes  MRN: [id number]  Today's Date: 6/15/2025    Admit Date: 6/7/2025    Plan: [facility]    Discharge Plan       Row Name 06/15/25 1314       Plan    Plan [facility]     Patient/Family in Agreement with Plan yes    Final Discharge Disposition Code 06 - home with home health care    Final Note Pt is being discharged home today. [facility]  has already accepted pt a few days ago per  note.  Left message for [facility] [phone] to inform of d/c status today.    Bhumi called back from [facility]  and faxing her information to [phone].                   Discharge Codes    No documentation.                 Expected Discharge Date and Time       Expected Discharge Date Expected Discharge Time    Jaydon 15, 2025               SARAH ArandaW

## 2025-06-15 NOTE — PLAN OF CARE
Goal Outcome Evaluation:  Plan of Care Reviewed With: patient        Progress: improving  Outcome Evaluation: Pt AO x4 on RA. VSS. Smith catheter to BSD, cear yellow urine. Multiple BMs this shift. catheter care. Turning. No c/o pain this shift. Call light in reach.

## 2025-06-15 NOTE — PROGRESS NOTES
Nephrology ([facility]) Progress Note      Patient:  Jennifer Gomes  YOB: 1940  Date of Service: 6/15/2025  MRN: [id number]   Acct: [id number]   Primary Care Physician: Katy Leone DO  Advance Directive:   Code Status and Medical Interventions: No CPR (Do Not Attempt to Resuscitate); Limited Support; No intubation (DNI)   Ordered at: 06/07/25 1414     Code Status (Patient has no pulse and is not breathing):    No CPR (Do Not Attempt to Resuscitate)     Medical Interventions (Patient has pulse or is breathing):    Limited Support     Medical Intervention Limits:    No intubation (DNI)     Level Of Support Discussed With:    Health Care Surrogate     Admit Date: 6/7/2025       Hospital Day: 8  Referring Provider: No Known Provider      Patient personally seen and examined.  Complete chart including Consults, Notes, Operative Reports, Labs, Cardiology, and Radiology studies reviewed as able.        Subjective:  Jennifer Gomes is a 84 y.o. female for whom we were consulted for evaluation and treatment of acute kidney injury with a history of chronic kidney disease of unknown stage.  No prior nephrologic evaluations.  She also has a history of hypertension, GERD, fibromyalgia, depression, sleep apnea and dementia.  She presented to the emergency room with worsening mental status and was diagnosed with cystitis and acute kidney injury.  She was given IV antibiotics and fluids for treatment.    Today, no overnight events.  She denied current chest pain, shortness of air at rest, nausea or vomiting.  No family present today.  Smith catheter replaced with subsequent improvement in renal function over the next several days.  Reports no issues with diet..    Allergies:  Ropinirole hcl, Codeine, Definity [perflutren lipid microsphere], Ambien [zolpidem], Eszopiclone, Pregabalin, and Tizanidine    Home Meds:  Medications Prior to Admission   Medication Sig Dispense Refill Last  Dose/Taking    amLODIPine (NORVASC) 5 MG tablet Take 1 tablet by mouth Daily.   Taking    atorvastatin (LIPITOR) 40 MG tablet Take 1 tablet by mouth Daily.   Taking    butalbital-acetaminophen-caffeine (FIORICET, ESGIC) -40 MG per tablet Take 1 tablet by mouth Every 12 (Twelve) Hours.   Taking    carvedilol (COREG) 3.125 MG tablet Take 1 tablet by mouth 2 (Two) Times a Day With Meals.   Taking    cyclobenzaprine (FLEXERIL) 10 MG tablet Take 1 tablet by mouth Every 12 (Twelve) Hours.   Taking    donepezil (ARICEPT) 10 MG tablet Take 1 tablet by mouth Every Night.   Taking    ergocalciferol (ERGOCALCIFEROL) 91443 units capsule Take 1 capsule by mouth 1 (One) Time Per Week. Saturday   Taking    gabapentin (NEURONTIN) 300 MG capsule Take 1 capsule by mouth 2 (Two) Times a Day.   Taking    lansoprazole (PREVACID) 30 MG capsule Take 1 capsule by mouth Daily.   Taking    levothyroxine (SYNTHROID, LEVOTHROID) 50 MCG tablet Take 1 tablet by mouth Daily.   Taking    oxyCODONE (ROXICODONE) 15 MG immediate release tablet Take 1 tablet by mouth 3 times a day.   Taking    sertraline (ZOLOFT) 25 MG tablet Take 1 tablet by mouth Daily. 90 tablet 0 Taking    acetaminophen (TYLENOL) 325 MG tablet Take 2 tablets by mouth Every 6 (Six) Hours As Needed for Mild Pain.          Medicines:  Current Facility-Administered Medications   Medication Dose Route Frequency Provider Last Rate Last Admin    acetaminophen (TYLENOL) tablet 650 mg  650 mg Oral Q4H PRN Holli Menendez APRN   650 mg at 06/10/25 2006    Or    acetaminophen (TYLENOL) 160 MG/5ML oral solution 650 mg  650 mg Oral Q4H PRN Holli Menendez APRN        Or    acetaminophen (TYLENOL) suppository 650 mg  650 mg Rectal Q4H PRN Holli Menendez APRN        amLODIPine (NORVASC) tablet 5 mg  5 mg Oral Q24H Abdirahman Schroeder MD   5 mg at 06/13/25 0827    atorvastatin (LIPITOR) tablet 40 mg  40 mg Oral Daily Holli Menendez APRN   40 mg at 06/15/25 0856     sennosides-docusate (PERICOLACE) 8.6-50 MG per tablet 2 tablet  2 tablet Oral BID Abdirahman Schroeder MD   2 tablet at 06/14/25 0854    And    bisacodyl (DULCOLAX) EC tablet 5 mg  5 mg Oral Daily PRN Abdirahman Schroeder MD        bisacodyl (DULCOLAX) suppository 10 mg  10 mg Rectal Daily Abdirahman Schroeder MD   10 mg at 06/13/25 0828    Calcium Replacement - Follow Nurse / BPA Driven Protocol   Not Applicable PRN Holli Menendez APRN        carvedilol (COREG) tablet 3.125 mg  3.125 mg Oral BID With Meals Abdirahman Schroeder MD   3.125 mg at 06/15/25 0856    donepezil (ARICEPT) tablet 10 mg  10 mg Oral Nightly Holli Menendez APRN   10 mg at 06/14/25 2122    gabapentin (NEURONTIN) capsule 300 mg  300 mg Oral Q12H Holli Menendez APRN   300 mg at 06/15/25 0856    levothyroxine (SYNTHROID, LEVOTHROID) tablet 50 mcg  50 mcg Oral QAM Holli Menendez APRN   50 mcg at 06/15/25 0607    Magnesium Standard Dose Replacement - Follow Nurse / BPA Driven Protocol   Not Applicable PRN Holli Menendez APRN        nitroglycerin (NITROSTAT) SL tablet 0.4 mg  0.4 mg Sublingual Q5 Min PRN Holli Menendez APRN        ondansetron ODT (ZOFRAN-ODT) disintegrating tablet 4 mg  4 mg Oral Q6H PRN Holli Menendez APRN        Or    ondansetron (ZOFRAN) injection 4 mg  4 mg Intravenous Q6H PRN Holli Menendez APRN        oxyCODONE (ROXICODONE) immediate release tablet 15 mg  15 mg Oral TID PRN Holli Menendez APRN   15 mg at 06/15/25 1349    pantoprazole (PROTONIX) EC tablet 40 mg  40 mg Oral Q AM Holli Menendez APRN   40 mg at 06/15/25 0607    Phosphorus Replacement - Follow Nurse / BPA Driven Protocol   Not Applicable PRN Holli Menendez APRN        polyethylene glycol (MIRALAX) packet 17 g  17 g Oral Daily Abdirahman Schroeder MD   17 g at 06/11/25 1611    Potassium Replacement - Follow Nurse / BPA Driven Protocol   Not Applicable PRN Holli Menendez APRN        sertraline (ZOLOFT) tablet 25 mg  25 mg Oral Daily Gemma,  Holli HEATH APRN   25 mg at 06/15/25 0856    sodium chloride 0.9 % flush 10 mL  10 mL Intravenous Q12H Holli Menendez, APRN   10 mL at 25 0828    sodium chloride 0.9 % flush 10 mL  10 mL Intravenous PRN MenendezHolli L, APRN        sodium chloride 0.9 % infusion 40 mL  40 mL Intravenous PRN Holli Menendez APRN           Past Medical History:  Past Medical History:   Diagnosis Date    Anxiety     Arthritis     Bronchitis     Cancer     uterine    Chronic pain     Depression     Disease of thyroid gland     Fibromyalgia     GERD (gastroesophageal reflux disease)     Headache     History of transfusion     AS     Hyperlipidemia     Hypertension     Incontinence     Insomnia     Leg pain     Lumbar stenosis     Migraines     Peptic ulcer     Restless legs     Sleep apnea     NO C-PAP    UTI (urinary tract infection)     Vaginal bleeding        Past Surgical History:  Past Surgical History:   Procedure Laterality Date    BLADDER REPAIR      MESH HAD TO BE REMOVED IN     BREAST BIOPSY Right 2017    benign    BREAST CYST EXCISION Left     CARDIAC CATHETERIZATION      CARPAL TUNNEL RELEASE      CATARACT EXTRACTION W/ INTRAOCULAR LENS  IMPLANT, BILATERAL      CHOLECYSTECTOMY WITH INTRAOPERATIVE CHOLANGIOGRAM N/A 2023    Procedure: CHOLECYSTECTOMY LAPAROSCOPIC INTRAOPERATIVE CHOLANGIOGRAM;  Surgeon: Gerardo Arciniega MD;  Location: [facility] OR;  Service: General;  Laterality: N/A;    COLONOSCOPY      COLONOSCOPY N/A 10/01/2021    Procedure: COLONOSCOPY WITH ANESTHESIA;  Surgeon: Tom Velasco DO;  Location: [facility] ENDOSCOPY;  Service: Gastroenterology;  Laterality: N/A;  pre: change in bowel habits  post: diverticulosis. hemorrhoids.   Olivia Mora APRN        CYSTECTOMY      D & C HYSTEROSCOPY N/A 2017    Procedure: DILATATION AND CURETTAGE HYSTEROSCOPY;  Surgeon: Shasta Madrigal MD;  Location: [facility] OR;  Service:     DILATION AND CURETTAGE, DIAGNOSTIC / THERAPEUTIC       ENDOSCOPY  09/23/2010    Short segment of Arriola's,Moderate chroninc esophagogastritis and negative H.pylori    ENDOSCOPY N/A 09/25/2017    Procedure: ESOPHAGOGASTRODUODENOSCOPY WITH ANESTHESIA;  Surgeon: Tom Velasco DO;  Location: [facility] ENDOSCOPY;  Service:     EYE SURGERY      RETINA    HEMORRHOIDECTOMY SIGMOIDOSCOPY N/A 3/21/2023    Procedure: HEMORRHOIDECTOMY WITH EXAM UNDER ANESTHESIA;  Surgeon: Holly Chavez MD;  Location: [facility] OR;  Service: General;  Laterality: N/A;    HYSTERECTOMY  12/20/2017    ORIF TIBIA/FIBULA FRACTURES Left 2000    TRANSVAGINAL TAPING SUSPENSION N/A 11/06/2017    Procedure: VAGINAL MESH REVISION;  Surgeon: Shasta Madrigal MD;  Location:  [facility] OR;  Service:     VAGINAL MESH REVISION  2013       Family History  Family History   Problem Relation Age of Onset    Diabetes Mother     Multiple myeloma Mother     Stroke Father     Diabetes Sister     Prostate cancer Brother     Lymphoma Brother         NHL    Ovarian cancer Paternal Aunt     Cancer Paternal Grandmother         metastatic    Lung cancer Paternal Grandfather     Colon cancer Neg Hx     Esophageal cancer Neg Hx     Breast cancer Neg Hx        Social History  Social History     Socioeconomic History    Marital status:    Tobacco Use    Smoking status: Never    Smokeless tobacco: Never   Vaping Use    Vaping status: Never Used   Substance and Sexual Activity    Alcohol use: Not Currently     Comment: occasional    Drug use: No    Sexual activity: Defer       Review of Systems:  History obtained from chart review and the patient  General ROS: No fever or chills  Respiratory ROS: No cough, shortness of breath, wheezing  Cardiovascular ROS: No chest pain or palpitations  Gastrointestinal ROS: No abdominal pain or melena  Genito-Urinary ROS: No dysuria or hematuria  Psych ROS: No anxiety and depression  14 point ROS reviewed with the patient and negative except as noted above and in the HPI unless unable to  obtain.    Objective:  Patient Vitals for the past 24 hrs:   BP Temp Temp src Pulse Resp SpO2   06/15/25 1127 119/47 98.2 °F (36.8 °C) Oral 79 16 95 %   06/15/25 0822 107/56 97.6 °F (36.4 °C) Oral 58 16 97 %   06/15/25 0436 137/53 97.9 °F (36.6 °C) Oral 73 16 96 %   06/14/25 2001 124/55 98.2 °F (36.8 °C) Oral 87 16 96 %       Intake/Output Summary (Last 24 hours) at 6/15/2025 1542  Last data filed at 6/15/2025 1530  Gross per 24 hour   Intake 800 ml   Output 1225 ml   Net -425 ml     General: awake/alert   Chest:  clear to auscultation bilaterally without respiratory distress  CVS: regular rate and rhythm  Abdominal: soft, nontender, positive bowel sounds  Extremities: no cyanosis or edema  Skin: warm and dry without rash      Labs:  Results from last 7 days   Lab Units 06/14/25  0428 06/13/25  0428 06/12/25  0824   WBC 10*3/mm3 6.34 6.67 7.00   HEMOGLOBIN g/dL 7.1* 7.2* 7.2*   HEMATOCRIT % 22.6* 22.6* 21.2*   PLATELETS 10*3/mm3 183 148 126*         Results from last 7 days   Lab Units 06/14/25  0428 06/13/25  1203 06/13/25  0428 06/12/25  0824 06/10/25  0309 06/09/25  0501   SODIUM mmol/L 140  --  140 140   < > 139   POTASSIUM mmol/L 4.5 3.9 3.6 3.7   < > 4.5   CHLORIDE mmol/L 105  --  103 107   < > 114*   CO2 mmol/L 28.0  --  29.0 22.0   < > 19.0*   BUN mg/dL 28.2*  --  27.4* 25.3*   < > 47.3*   CREATININE mg/dL 1.81*  --  1.79* 1.89*   < > 2.75*   CALCIUM mg/dL 8.7  --  8.5* 8.5*   < > 8.5*   EGFR mL/min/1.73 27.7*  --  28.0* 26.3*   < > 16.7*   BILIRUBIN mg/dL  --   --   --   --   --  <0.2   ALK PHOS U/L  --   --   --   --   --  48   ALT (SGPT) U/L  --   --   --   --   --  <5   AST (SGOT) U/L  --   --   --   --   --  6   GLUCOSE mg/dL 108*  --  111* 104*   < > 100*    < > = values in this interval not displayed.       Radiology:   Imaging Results (Last 72 Hours)       ** No results found for the last 72 hours. **            Culture:  Blood Culture   Date Value Ref Range Status   06/08/2025 No growth at 24 hours   Preliminary     Urine Culture   Date Value Ref Range Status   06/07/2025 >100,000 CFU/mL Escherichia coli (A)  Final         Assessment   Acute kidney injury-obstructive  Acute cystitis  Metabolic acidosis  Anemia with iron deficiency  Dementia  Chronic kidney disease-unknown stage  Hypertension with recent hypotension    Plan:  Discussed with patient, nursing, hospitalist  Workup reviewed today  Monitor labs  Fluids adjustments as per orders, weaned for now as continued improvement with Smith catheter and patient able to maintain adequate dietary intake  Renal function improved with replaced Smith catheter  Urology evaluation reviewed as current  Deferred further neurologic workup to primary service  Transfuse packed red blood cells as needed for hemoglobin less than 7      Basil Bermudez MD  6/15/2025  15:42 CDT

## 2025-06-15 NOTE — PROGRESS NOTES
[facility] Medicine Services  INPATIENT PROGRESS NOTE    Patient Name: Jennifer Gomes  Date of Admission: 6/7/2025  Today's Date: 06/14/25  Length of Stay: 7  Primary Care Physician: Katy Leone DO    Subjective   Chief Complaint: Weakness  HPI   Patient very pleasant today.  No new complaints.  Tolerating oral intake.  Smith in place.    Review of Systems   All pertinent negatives and positives are as above. All other systems have been reviewed and are negative unless otherwise stated.     Objective    Temp:  [97.6 °F (36.4 °C)-98.2 °F (36.8 °C)] 98.2 °F (36.8 °C)  Heart Rate:  [58-87] 79  Resp:  [16] 16  BP: (107-137)/(47-56) 119/47  Physical Exam  Constitutional:       Appearance: She is not toxic-appearing.   HENT:      Head: Normocephalic.      Mouth/Throat:      Mouth: Mucous membranes are moist.   Pulmonary:      Effort: Pulmonary effort is normal. No respiratory distress.   Abdominal:      Palpations: Abdomen is soft.      Tenderness: There is no abdominal tenderness. There is no guarding.   Genitourinary:     Comments: Smith in place; normal appearing urine  Musculoskeletal:         General: No swelling.   Skin:     General: Skin is warm.   Neurological:      Mental Status: She is alert.      Motor: Weakness present.   Psychiatric:         Mood and Affect: Mood normal.       Results Review:  I have reviewed the labs, radiology results, and diagnostic studies.    Laboratory Data:   Results from last 7 days   Lab Units 06/14/25 0428 06/13/25 0428 06/12/25  0824   WBC 10*3/mm3 6.34 6.67 7.00   HEMOGLOBIN g/dL 7.1* 7.2* 7.2*   HEMATOCRIT % 22.6* 22.6* 21.2*   PLATELETS 10*3/mm3 183 148 126*        Results from last 7 days   Lab Units 06/14/25  0428 06/13/25  1203 06/13/25  0428 06/12/25  0824 06/10/25  0309 06/09/25  0501   SODIUM mmol/L 140  --  140 140   < > 139   POTASSIUM mmol/L 4.5 3.9 3.6 3.7   < > 4.5   CHLORIDE mmol/L 105  --  103 107   < > 114*   CO2 mmol/L  "28.0  --  29.0 22.0   < > 19.0*   BUN mg/dL 28.2*  --  27.4* 25.3*   < > 47.3*   CREATININE mg/dL 1.81*  --  1.79* 1.89*   < > 2.75*   CALCIUM mg/dL 8.7  --  8.5* 8.5*   < > 8.5*   BILIRUBIN mg/dL  --   --   --   --   --  <0.2   ALK PHOS U/L  --   --   --   --   --  48   ALT (SGPT) U/L  --   --   --   --   --  <5   AST (SGOT) U/L  --   --   --   --   --  6   GLUCOSE mg/dL 108*  --  111* 104*   < > 100*    < > = values in this interval not displayed.       Culture Data:   No results found for: \"BLOODCX\", \"URINECX\", \"WOUNDCX\", \"MRSACX\", \"RESPCX\", \"STOOLCX\"    Radiology Data:   Imaging Results (Last 24 Hours)       ** No results found for the last 24 hours. **            I have reviewed the patient's current medications.     Assessment/Plan   Assessment  Active Hospital Problems    Diagnosis     **Altered mental status     Generalized weakness     TOMÁS (acute kidney injury)     Hydronephrosis     Dementia     Acute UTI (urinary tract infection)     Essential hypertension     Anemia     Constipation        Treatment Plan  Vitals every 4 hours  Cardiac diet  Activity up with assistance  Patient ambulates with walker or wheelchair at home  IV fluids to saline lock    TOMÁS due to incomplete bladder emptying  Improving with Smith  Right hydronephrosis improving  Urology input noted    Sepsis due to UTI present on admission  Resolved  Rocephin treatment completed    Encephalopathy due to above  Improved  Underlying dementia    Hypertension  Continue medications  Mobility limitations  Patient has home DME    Plan for possible discharge home tomorrow with home health    Medical Decision Making  Number and Complexity of problems: 4 complex medical problems  Differential Diagnosis: see above    Conditions and Status        Condition is improving.     MDM Data  External documents reviewed: -  Cardiac tracing (EKG, telemetry) interpretation: -  Radiology interpretation: -  Labs reviewed: -  Any tests that were considered but not " ordered: -     Decision rules/scores evaluated (example [id number], Wells, etc): -     Discussed with: Patient     Care Planning  Shared decision making: Patient  Code status and discussions: DNR    Disposition  Social Determinants of Health that impact treatment or disposition: none  I expect the patient to be discharged to home in 1-2 days.         Electronically signed by Claudio Kauffman MD, 06/14/25, 12:35 CDT.

## 2025-06-16 ENCOUNTER — TRANSITIONAL CARE MANAGEMENT TELEPHONE ENCOUNTER (OUTPATIENT)
Dept: CALL CENTER | Facility: HOSPITAL | Age: 85
End: 2025-06-16
Payer: MEDICARE
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
## 2025-06-16 NOTE — THERAPY DISCHARGE NOTE
Acute Care - Physical Therapy Discharge Summary  [facility]       Patient Name: Jennifer Gomes  : 1940  MRN: [id number]    Today's Date: 2025                 Admit Date: 2025      PT Recommendation and Plan    Visit Dx:    ICD-10-CM ICD-9-CM   1. Failure to thrive in adult  R62.7 783.7   2. Altered mental status, unspecified altered mental status type  R41.82 780.97   3. Acute renal failure superimposed on chronic kidney disease, unspecified acute renal failure type, unspecified CKD stage  N17.9 584.9    N18.9 585.9   4. Acute UTI (urinary tract infection)  N39.0 599.0   5. Anemia, unspecified type  D64.9 285.9   6. Discharge planning issues  Z75.8 V49.89   7. Impaired mobility [Z74.09]  Z74.09 799.89   8. Anemia requiring transfusions  D64.9 285.9                PT Rehab Goals       Row Name 25 1554             Bed Mobility Goal 1 (PT)    Activity/Assistive Device (Bed Mobility Goal 1, PT) bed mobility activities, all  -OUSMAEN      [place] Level/Cues Needed (Bed Mobility Goal 1, PT) independent  -OUSMANE      Time Frame (Bed Mobility Goal 1, PT) long term goal (LTG);10 days  -OUSMANE      Progress/Outcomes (Bed Mobility Goal 1, PT) goal not met  -OUSMANE         Transfer Goal 1 (PT)    Activity/Assistive Device (Transfer Goal 1, PT) sit-to-stand/stand-to-sit;wheelchair transfer;toilet;bed-to-chair/chair-to-bed  -OUSMANE      [place] Level/Cues Needed (Transfer Goal 1, PT) standby assist  -OUSMANE      Time Frame (Transfer Goal 1, PT) long term goal (LTG);10 days  -OUSMANE      Progress/Outcome (Transfer Goal 1, PT) goal not met  -OUSMANE         Balance Goal 1 (PT)    Activity/Assistive Device (Balance Goal) sit to stand dynamic balance;standing static balance;standing dynamic balance;sitting static balance;sitting dynamic balance;with functional activities/occupations;with functional mobility activities;with ADLs;supported  -OUSMANE      [place] Level/Cues Needed (Balance Goal 1, PT) contact guard required  -OUSMANE       Time Frame (Balance Goal 1, PT) long-term goal (LTG);by discharge  -OUSMANE      Progress/Outcomes (Balance Goal 1, PT) goal not met  new goal  -OUSMANE                User Key  (r) = Recorded By, (t) = Taken By, (c) = Cosigned By      Initials Name Provider Type Discipline    Sadi Sunshine, PTA Physical Therapist Assistant PT                        PT Discharge Summary  Anticipated Discharge Disposition (PT): home with home health  Reason for Discharge: Discharge from facility  Outcomes Achieved: Refer to plan of care for updates on goals achieved  Discharge Destination: Home with home health      Sadi Shah PTA   6/16/2025

## 2025-06-16 NOTE — OUTREACH NOTE
Call Center TCM Note      Flowsheet Row Responses   [facility] patient discharged from? [place]   Does the patient have one of the following disease processes/diagnoses(primary or secondary)? Sepsis   TCM attempt successful? Yes   Call start time 1234   Call end time 1238   Discharge diagnosis Sepsis due to UTI   Is patient permission given to speak with other caregiver? Yes   List who call center can speak with Lola daughter   Person spoke with today (if not patient) and relationship Lola daughter   Meds reviewed with patient/caregiver? Yes   Is the patient having any side effects they believe may be caused by any medication additions or changes? No   Does the patient have all medications related to this admission filled (includes all antibiotics, inhalers, nebulizers,steroids,etc.) Yes   Prescription comments Pharmacy will alert pt when ready for    Is the patient taking all medications as directed (includes completed medication regime)? Yes   Comments Hosp dc fu/My chart video visit apt on 6/19/25   Does the patient have an appointment with their PCP within 7-14 days of discharge? Yes   What is the Home health agency?  [facility]   Home health comments PT scheduled for 6/17/25   Did the patient receive a copy of their discharge instructions? Yes   Nursing interventions Reviewed instructions with patient   What is the patient's perception of their health status since discharge? Improving   Nursing interventions Nurse provided patient education   Is the patient/caregiver able to teach back TIME? T emperature - higher or lower than normal, I nfection - may have signs and symptoms of an infection, M ental Decline - confused, sleepy, difficult to arouse, E xtremely Ill - severe pain, discomfort, shortness of breath   Is patient/caregiver able to teach back steps to recovery at home? Set small, achievable goals for return to baseline health, Rest and regain strength, Talk about feelings  with family/friends, Record milestones and struggles in a journal, Make a list of questions for PCP appoinment, Exercise as tolerated   Is the patient/caregiver able to teach back signs and symptoms of worsening condition: Fever, Hyperthermia, Rapid heart rate (>90), Shortness of breath/rapid respiratory rate   If the patient is a current smoker, are they able to teach back resources for cessation? Not a smoker   Is the patient/caregiver able to teach back the hierarchy of who to call/visit for symptoms/problems? PCP, Specialist, Home health nurse, Urgent Care, ED, 911 Yes   TCM call completed? Yes   Call end time 1238            Shasta H - Registered Nurse    6/16/2025, 12:39 CDT

## 2025-06-17 ENCOUNTER — TELEPHONE (OUTPATIENT)
Dept: UROLOGY | Facility: CLINIC | Age: 85
End: 2025-06-17
Payer: MEDICARE

## 2025-06-17 NOTE — TELEPHONE ENCOUNTER
I called to set up follow up with Akosua Bueno in 4 weeks for catheter exchange and to discuss management. I spoke to Lola and she said she would call back.    OK for [facility] to confirm and/or schedule in 4 weeks with Akosua.

## 2025-06-19 ENCOUNTER — TELEMEDICINE (OUTPATIENT)
Dept: FAMILY MEDICINE CLINIC | Facility: CLINIC | Age: 85
End: 2025-06-19
Payer: MEDICARE

## 2025-06-19 DIAGNOSIS — F03.90 DEMENTIA, UNSPECIFIED DEMENTIA SEVERITY, UNSPECIFIED DEMENTIA TYPE, UNSPECIFIED WHETHER BEHAVIORAL, PSYCHOTIC, OR MOOD DISTURBANCE OR ANXIETY: ICD-10-CM

## 2025-06-19 DIAGNOSIS — N18.9 CHRONIC KIDNEY DISEASE, UNSPECIFIED CKD STAGE: ICD-10-CM

## 2025-06-19 DIAGNOSIS — N39.0 SEPSIS DUE TO URINARY TRACT INFECTION: ICD-10-CM

## 2025-06-19 DIAGNOSIS — Z09 HOSPITAL DISCHARGE FOLLOW-UP: Primary | ICD-10-CM

## 2025-06-19 DIAGNOSIS — A41.9 SEPSIS DUE TO URINARY TRACT INFECTION: ICD-10-CM

## 2025-06-26 ENCOUNTER — READMISSION MANAGEMENT (OUTPATIENT)
Dept: CALL CENTER | Facility: HOSPITAL | Age: 85
End: 2025-06-26
Payer: MEDICARE

## 2025-06-26 NOTE — OUTREACH NOTE
Sepsis Week 2 Survey      Flowsheet Row Responses   [facility] patient discharged from? [place]   Does the patient have one of the following disease processes/diagnoses(primary or secondary)? Sepsis   Week 2 attempt successful? Yes   Call start time 1456   Call end time 1458   Meds reviewed with patient/caregiver? Yes   Is the patient having any side effects they believe may be caused by any medication additions or changes? No   Does the patient have all medications related to this admission filled (includes all antibiotics, inhalers, nebulizers,steroids,etc.) Yes   Is the patient taking all medications as directed (includes completed medication regime)? Yes   Does the patient have a primary care provider?  Yes   Does the patient have an appointment with their PCP within 7 days of discharge? Yes   Has the patient kept scheduled appointments due by today? Yes   What is the Home health agency?  [facility]   Has home health visited the patient within 72 hours of discharge? Yes   Psychosocial issues? No   What is the patient's perception of their health status since discharge? Same   Is the patient/caregiver able to teach back TIME? T emperature - higher or lower than normal, M ental Decline - confused, sleepy, difficult to arouse   Nursing interventions Nurse provided patient education   Is patient/caregiver able to teach back steps to recovery at home? Set small, achievable goals for return to baseline health, Rest and regain strength, Eat a balanced diet   Is the patient/caregiver able to teach back signs and symptoms of worsening condition: Shortness of breath/rapid respiratory rate, Altered mental status(confusion/coma)   Week 2 call completed? Yes   Is the patient interested in additional calls from an ambulatory ? No   Would this patient benefit from a Referral to [facility]? No   Call end time 1458            [place] T - Registered Nurse

## 2025-07-01 ENCOUNTER — TELEPHONE (OUTPATIENT)
Dept: FAMILY MEDICINE CLINIC | Facility: CLINIC | Age: 85
End: 2025-07-01
Payer: MEDICARE

## 2025-07-01 NOTE — TELEPHONE ENCOUNTER
Home health called regarding her vitals and general overall feeling. Spoke with dr. Leone we instructed patient to go to the emergency room.

## 2025-07-08 ENCOUNTER — READMISSION MANAGEMENT (OUTPATIENT)
Dept: CALL CENTER | Facility: HOSPITAL | Age: 85
End: 2025-07-08
Payer: MEDICARE

## 2025-07-08 NOTE — OUTREACH NOTE
Sepsis Week 3 Survey      Flowsheet Row Responses   [facility] patient discharged from? [place]   Does the patient have one of the following disease processes/diagnoses(primary or secondary)? Sepsis   Week 3 attempt successful? No   Unsuccessful attempts Attempt 1            SUZIE Ross Registered Nurse

## 2025-07-09 ENCOUNTER — DOCUMENTATION (OUTPATIENT)
Dept: FAMILY MEDICINE CLINIC | Facility: CLINIC | Age: 85
End: 2025-07-09

## 2025-07-09 NOTE — PROGRESS NOTES
Family with home health called regarding patient.  Patient's daughter and caregiver unexpectedly passed away.  She was scheduled to have appointment with me today, but we will have to postpone this.  They are mainly concerned about her Smith bag.  She is wanting to get this out before her daughter's  this weekend.  She has an appointment on Monday with urology and would highly recommend keeping this until that time.  She is agreeable.      Her family along with [facility] care are available to help patient in this transition.  Will try to reschedule her appointment to discuss long-term plan.    Katy Leone, DO

## 2025-07-10 ENCOUNTER — TELEPHONE (OUTPATIENT)
Dept: FAMILY MEDICINE CLINIC | Facility: CLINIC | Age: 85
End: 2025-07-10
Payer: MEDICARE

## 2025-07-10 DIAGNOSIS — I10 ESSENTIAL HYPERTENSION: ICD-10-CM

## 2025-07-10 DIAGNOSIS — E03.9 HYPOTHYROIDISM (ACQUIRED): Primary | ICD-10-CM

## 2025-07-10 DIAGNOSIS — F03.90 DEMENTIA, UNSPECIFIED DEMENTIA SEVERITY, UNSPECIFIED DEMENTIA TYPE, UNSPECIFIED WHETHER BEHAVIORAL, PSYCHOTIC, OR MOOD DISTURBANCE OR ANXIETY: ICD-10-CM

## 2025-07-10 DIAGNOSIS — I10 ESSENTIAL HYPERTENSION: Chronic | ICD-10-CM

## 2025-07-10 RX ORDER — LEVOTHYROXINE SODIUM 50 UG/1
50 TABLET ORAL DAILY
Qty: 30 TABLET | Refills: 0 | OUTPATIENT
Start: 2025-07-10

## 2025-07-10 RX ORDER — DONEPEZIL HYDROCHLORIDE 10 MG/1
10 TABLET, FILM COATED ORAL
Qty: 30 TABLET | Refills: 2 | Status: SHIPPED | OUTPATIENT
Start: 2025-07-10

## 2025-07-10 RX ORDER — AMLODIPINE BESYLATE 5 MG/1
5 TABLET ORAL DAILY
Qty: 30 TABLET | Refills: 5 | Status: SHIPPED | OUTPATIENT
Start: 2025-07-10

## 2025-07-10 RX ORDER — ATORVASTATIN CALCIUM 40 MG/1
40 TABLET, FILM COATED ORAL DAILY
Qty: 30 TABLET | Refills: 0 | OUTPATIENT
Start: 2025-07-10

## 2025-07-10 RX ORDER — LEVOTHYROXINE SODIUM 50 UG/1
50 TABLET ORAL DAILY
Qty: 30 TABLET | Refills: 2 | Status: SHIPPED | OUTPATIENT
Start: 2025-07-10

## 2025-07-10 RX ORDER — ATORVASTATIN CALCIUM 40 MG/1
40 TABLET, FILM COATED ORAL DAILY
Qty: 90 TABLET | Refills: 0 | Status: SHIPPED | OUTPATIENT
Start: 2025-07-10

## 2025-07-10 RX ORDER — DONEPEZIL HYDROCHLORIDE 10 MG/1
10 TABLET, FILM COATED ORAL NIGHTLY
Qty: 30 TABLET | Refills: 0 | OUTPATIENT
Start: 2025-07-10

## 2025-07-10 NOTE — TELEPHONE ENCOUNTER
Patient was on these medication during hospitalization. Refilled.     Most recent TSH was elevated in March. Will need repeat lab work at next visit.

## 2025-07-10 NOTE — PROGRESS NOTES
Subjective    Ms. Gomes is 84 y.o. female    Chief Complaint: hospital follow up, hydronephrosis    History of Present Illness    84-year-old female established patient in for hospital follow-up after patient again presented to [facility] ER due to altered mental status changes.  Was found to have E. coli positive urine culture infection along with bilateral hydronephrosis largest on the right side. Dr Cancino with  urology was consulted.Patient was admitted back in October 2024 with same findings at which time indwelling Smith catheter was placed.  Was later seen in clinic where patient demanded indwelling Smith catheter removal.    Patient ultimately underwent nuclear medicine renal function scan during recent hospitalization as patient still exhibited right sided hydronephrosis after Smith catheter placement but and improved renal function wanting to fully evaluate for possible obstruction.  Patient was noted to have abnormal right renogram with prolonged time to peak activity at 22 minutes and a shallow downward curve following Lasix administration to the point the time of half emptying was not achieved during the 40-minute exam, with approx 25% excretion of tracer from right kidney at 40 mins.    The following portions of the patient's history were reviewed and updated as appropriate: allergies, current medications, past family history, past medical history, past social history, past surgical history and problem list.    Review of Systems   Gastrointestinal:  Negative for nausea and vomiting.         Current Outpatient Medications:     acetaminophen (TYLENOL) 325 MG tablet, Take 2 tablets by mouth Every 6 (Six) Hours As Needed for Mild Pain., Disp: , Rfl:     amLODIPine (NORVASC) 5 MG tablet, TAKE 1 TABLET BY MOUTH ONCE DAILY, Disp: 30 tablet, Rfl: 5    atorvastatin (LIPITOR) 40 MG tablet, TAKE 1 TABLET BY MOUTH ONCE DAILY, Disp: 90 tablet, Rfl: 0    carvedilol (COREG) 3.125 MG tablet, Take 1 tablet by mouth 2 (Two)  Times a Day With Meals., Disp: , Rfl:     cyclobenzaprine (FLEXERIL) 10 MG tablet, Take 1 tablet by mouth Every 12 (Twelve) Hours., Disp: , Rfl:     donepezil (ARICEPT) 10 MG tablet, TAKE 1 TABLET BY MOUTH EVERY NIGHT AT BEDTIME, Disp: 30 tablet, Rfl: 2    ergocalciferol (ERGOCALCIFEROL) 79258 units capsule, Take 1 capsule by mouth 1 (One) Time Per Week. Saturday, Disp: , Rfl:     gabapentin (NEURONTIN) 300 MG capsule, Take 1 capsule by mouth 2 (Two) Times a Day., Disp: , Rfl:     lansoprazole (PREVACID) 30 MG capsule, Take 1 capsule by mouth Daily., Disp: , Rfl:     levothyroxine (SYNTHROID, LEVOTHROID) 50 MCG tablet, TAKE 1 TABLET BY MOUTH ONCE DAILY, Disp: 30 tablet, Rfl: 2    oxyCODONE (ROXICODONE) 15 MG immediate release tablet, Take 1 tablet by mouth 3 times a day., Disp: , Rfl:     sennosides-docusate (PERICOLACE) 8.6-50 MG per tablet, Take 1 tablet by mouth 2 (Two) Times a Day., Disp: 60 tablet, Rfl: 0    sertraline (ZOLOFT) 25 MG tablet, Take 1 tablet by mouth Daily., Disp: 90 tablet, Rfl: 0    Past Medical History:   Diagnosis Date    Anxiety     Arthritis     Bronchitis     Cancer     uterine    Chronic pain     Depression     Disease of thyroid gland     Fibromyalgia     GERD (gastroesophageal reflux disease)     Headache     History of transfusion     AS     Hyperlipidemia     Hypertension     Incontinence     Insomnia     Leg pain     Lumbar stenosis     Migraines     Peptic ulcer     Restless legs     Sleep apnea     NO C-PAP    UTI (urinary tract infection)     Vaginal bleeding        Past Surgical History:   Procedure Laterality Date    BLADDER REPAIR      MESH HAD TO BE REMOVED IN 2013    BREAST BIOPSY Right 2017    benign    BREAST CYST EXCISION Left     CARDIAC CATHETERIZATION      CARPAL TUNNEL RELEASE      CATARACT EXTRACTION W/ INTRAOCULAR LENS  IMPLANT, BILATERAL      CHOLECYSTECTOMY WITH INTRAOPERATIVE CHOLANGIOGRAM N/A 2023    Procedure: CHOLECYSTECTOMY LAPAROSCOPIC  INTRAOPERATIVE CHOLANGIOGRAM;  Surgeon: Gerardo Arciniega MD;  Location:  [facility] OR;  Service: General;  Laterality: N/A;    COLONOSCOPY      COLONOSCOPY N/A 10/01/2021    Procedure: COLONOSCOPY WITH ANESTHESIA;  Surgeon: Tom Velasco DO;  Location: [facility] ENDOSCOPY;  Service: Gastroenterology;  Laterality: N/A;  pre: change in bowel habits  post: diverticulosis. hemorrhoids.   Olivia Mora, APRN        CYSTECTOMY      D & C HYSTEROSCOPY N/A 11/06/2017    Procedure: DILATATION AND CURETTAGE HYSTEROSCOPY;  Surgeon: Shasta Madrigal MD;  Location: [facility] OR;  Service:     DILATION AND CURETTAGE, DIAGNOSTIC / THERAPEUTIC  2008    ENDOSCOPY  09/23/2010    Short segment of Arriola's,Moderate chroninc esophagogastritis and negative H.pylori    ENDOSCOPY N/A 09/25/2017    Procedure: ESOPHAGOGASTRODUODENOSCOPY WITH ANESTHESIA;  Surgeon: Tom Velasco DO;  Location: [facility] ENDOSCOPY;  Service:     EYE SURGERY      RETINA    HEMORRHOIDECTOMY SIGMOIDOSCOPY N/A 3/21/2023    Procedure: HEMORRHOIDECTOMY WITH EXAM UNDER ANESTHESIA;  Surgeon: Holly Chavez MD;  Location: [facility] OR;  Service: General;  Laterality: N/A;    HYSTERECTOMY  12/20/2017    ORIF TIBIA/FIBULA FRACTURES Left 2000    TRANSVAGINAL TAPING SUSPENSION N/A 11/06/2017    Procedure: VAGINAL MESH REVISION;  Surgeon: Shasta Madrigal MD;  Location: [facility] OR;  Service:     VAGINAL MESH REVISION  2013       Social History     Socioeconomic History    Marital status:    Tobacco Use    Smoking status: Never    Smokeless tobacco: Never   Vaping Use    Vaping status: Never Used   Substance and Sexual Activity    Alcohol use: Not Currently     Comment: occasional    Drug use: No    Sexual activity: Defer       Family History   Problem Relation Age of Onset    Diabetes Mother     Multiple myeloma Mother     Stroke Father     Diabetes Sister     Prostate cancer Brother     Lymphoma Brother         NHL    Ovarian cancer Paternal Aunt     Cancer  Paternal Grandmother         metastatic    Lung cancer Paternal Grandfather     Colon cancer Neg Hx     Esophageal cancer Neg Hx     Breast cancer Neg Hx        Objective    LMP  (LMP Unknown)     Physical Exam  Genitourinary:     Comments: Indwelling mcclain catheter in place draining cloudy yellow sediment urine            Results for orders placed or performed during the hospital encounter of 06/07/25   ECG 12 Lead Altered Mental Status    Collection Time: 06/07/25 10:27 AM   Result Value Ref Range    QT Interval 382 ms    QTC Interval 446 ms   Urine Culture - Urine, Urine, Catheter    Collection Time: 06/07/25 11:48 AM    Specimen: Urine, Catheter   Result Value Ref Range    Urine Culture >100,000 CFU/mL Escherichia coli (A)        Susceptibility    Escherichia coli - CHULA     Amoxicillin + Clavulanate  Resistant ug/ml     Ampicillin  Resistant ug/ml     Ampicillin + Sulbactam  Intermediate ug/ml     Cefazolin (Urine)  Susceptible ug/ml     Cefepime  Susceptible ug/ml     Ceftazidime  Susceptible ug/ml     Ceftriaxone  Susceptible ug/ml     Cefuroxime axetil  Intermediate ug/ml     Gentamicin  Susceptible ug/ml     Levofloxacin  Susceptible ug/ml     Nitrofurantoin  Susceptible ug/ml     Piperacillin + Tazobactam  Susceptible ug/ml     Trimethoprim + Sulfamethoxazole  Susceptible ug/ml   Urinalysis With Culture If Indicated - Urine, Catheter    Collection Time: 06/07/25 11:48 AM    Specimen: Urine, Catheter   Result Value Ref Range    Color, UA Yellow Yellow, Straw    Appearance, UA Turbid (A) Clear    pH, UA 5.5 5.0 - 8.0    Specific Gravity, UA 1.016 1.005 - 1.030    Glucose, UA Negative Negative    Ketones, UA Negative Negative    Bilirubin, UA Negative Negative    Blood, UA Moderate (2+) (A) Negative    Protein,  mg/dL (2+) (A) Negative    Leuk Esterase, UA Large (3+) (A) Negative    Nitrite, UA Positive (A) Negative    Urobilinogen, UA 0.2 E.U./dL 0.2 - 1.0 E.U./dL   Urinalysis, Microscopic Only - Urine,  Catheter    Collection Time: 06/07/25 11:48 AM    Specimen: Urine, Catheter   Result Value Ref Range    RBC, UA None Seen None Seen, 0-2 /HPF    WBC, UA Too Numerous to Count (A) None Seen, 0-2 /HPF    Bacteria, UA 3+ (A) None Seen /HPF    Squamous Epithelial Cells, UA None Seen None Seen, 0-2 /HPF    Methodology Manual Light Microscopy    Comprehensive Metabolic Panel    Collection Time: 06/07/25 12:55 PM    Specimen: Blood   Result Value Ref Range    Glucose 103 (H) 65 - 99 mg/dL    BUN 48.7 (H) 8.0 - 23.0 mg/dL    Creatinine 2.77 (H) 0.57 - 1.00 mg/dL    Sodium 138 136 - 145 mmol/L    Potassium 4.6 3.5 - 5.2 mmol/L    Chloride 106 98 - 107 mmol/L    CO2 21.0 (L) 22.0 - 29.0 mmol/L    Calcium 9.3 8.6 - 10.5 mg/dL    Total Protein 6.6 6.0 - 8.5 g/dL    Albumin 3.1 (L) 3.5 - 5.2 g/dL    ALT (SGPT) 6 1 - 33 U/L    AST (SGOT) 12 1 - 32 U/L    Alkaline Phosphatase 60 39 - 117 U/L    Total Bilirubin <0.2 0.0 - 1.2 mg/dL    Globulin 3.5 gm/dL    A/G Ratio 0.9 g/dL    BUN/Creatinine Ratio 17.6 7.0 - 25.0    Anion Gap 11.0 5.0 - 15.0 mmol/L    eGFR 16.6 (L) >60.0 mL/min/1.73   Magnesium    Collection Time: 06/07/25 12:55 PM    Specimen: Blood   Result Value Ref Range    Magnesium 2.0 1.6 - 2.4 mg/dL   CK    Collection Time: 06/07/25 12:55 PM    Specimen: Blood   Result Value Ref Range    Creatine Kinase 145 20 - 180 U/L   CBC Auto Differential    Collection Time: 06/07/25 12:55 PM    Specimen: Blood   Result Value Ref Range    WBC 7.13 3.40 - 10.80 10*3/mm3    RBC 2.69 (L) 3.77 - 5.28 10*6/mm3    Hemoglobin 7.8 (L) 12.0 - 15.9 g/dL    Hematocrit 24.6 (L) 34.0 - 46.6 %    MCV 91.4 79.0 - 97.0 fL    MCH 29.0 26.6 - 33.0 pg    MCHC 31.7 31.5 - 35.7 g/dL    RDW 13.4 12.3 - 15.4 %    RDW-SD 45.0 37.0 - 54.0 fl    MPV 10.3 6.0 - 12.0 fL    Platelets 208 140 - 450 10*3/mm3    Neutrophil % 63.4 42.7 - 76.0 %    Lymphocyte % 23.4 19.6 - 45.3 %    Monocyte % 8.8 5.0 - 12.0 %    Eosinophil % 3.4 0.3 - 6.2 %    Basophil % 0.4 0.0 -  1.5 %    Immature Grans % 0.6 (H) 0.0 - 0.5 %    Neutrophils, Absolute 4.52 1.70 - 7.00 10*3/mm3    Lymphocytes, Absolute 1.67 0.70 - 3.10 10*3/mm3    Monocytes, Absolute 0.63 0.10 - 0.90 10*3/mm3    Eosinophils, Absolute 0.24 0.00 - 0.40 10*3/mm3    Basophils, Absolute 0.03 0.00 - 0.20 10*3/mm3    Immature Grans, Absolute 0.04 0.00 - 0.05 10*3/mm3    nRBC 0.0 0.0 - 0.2 /100 WBC   Ferritin    Collection Time: 06/07/25 12:55 PM    Specimen: Blood   Result Value Ref Range    Ferritin 204.60 (H) 13.00 - 150.00 ng/mL   Iron Profile w/o Ferritin    Collection Time: 06/07/25 12:55 PM    Specimen: Blood   Result Value Ref Range    Iron 30 (L) 37 - 145 mcg/dL    Iron Saturation (TSAT) 14 (L) 20 - 50 %    Transferrin 141 (L) 200 - 360 mg/dL    TIBC 210 (L) 298 - 536 mcg/dL   Reticulocytes    Collection Time: 06/07/25 12:55 PM    Specimen: Blood   Result Value Ref Range    Reticulocyte % 1.64 0.70 - 1.90 %    Reticulocyte Absolute 0.0441 0.0200 - 0.1300 10*6/mm3   Vitamin B12    Collection Time: 06/07/25  7:38 PM    Specimen: Blood   Result Value Ref Range    Vitamin B-12 335 211 - 946 pg/mL   Folate    Collection Time: 06/07/25  7:38 PM    Specimen: Blood   Result Value Ref Range    Folate 7.08 4.78 - 24.20 ng/mL   Type & Screen    Collection Time: 06/07/25  8:19 PM    Specimen: Blood   Result Value Ref Range    ABO Type O     RH type Positive     Antibody Screen Negative     T&S Expiration Date 6/10/2025 11:59:59 PM    Basic Metabolic Panel    Collection Time: 06/08/25  3:42 AM    Specimen: Blood   Result Value Ref Range    Glucose 117 (H) 65 - 99 mg/dL    BUN 47.3 (H) 8.0 - 23.0 mg/dL    Creatinine 2.90 (H) 0.57 - 1.00 mg/dL    Sodium 138 136 - 145 mmol/L    Potassium 4.7 3.5 - 5.2 mmol/L    Chloride 110 (H) 98 - 107 mmol/L    CO2 18.0 (L) 22.0 - 29.0 mmol/L    Calcium 8.5 (L) 8.6 - 10.5 mg/dL    BUN/Creatinine Ratio 16.3 7.0 - 25.0    Anion Gap 10.0 5.0 - 15.0 mmol/L    eGFR 15.7 (L) >60.0 mL/min/1.73   CBC Auto  Differential    Collection Time: 06/08/25  3:42 AM    Specimen: Blood   Result Value Ref Range    WBC 7.89 3.40 - 10.80 10*3/mm3    RBC 2.63 (L) 3.77 - 5.28 10*6/mm3    Hemoglobin 7.5 (L) 12.0 - 15.9 g/dL    Hematocrit 24.1 (L) 34.0 - 46.6 %    MCV 91.6 79.0 - 97.0 fL    MCH 28.5 26.6 - 33.0 pg    MCHC 31.1 (L) 31.5 - 35.7 g/dL    RDW 13.5 12.3 - 15.4 %    RDW-SD 44.8 37.0 - 54.0 fl    MPV 10.3 6.0 - 12.0 fL    Platelets 185 140 - 450 10*3/mm3    Neutrophil % 70.0 42.7 - 76.0 %    Lymphocyte % 17.1 (L) 19.6 - 45.3 %    Monocyte % 8.7 5.0 - 12.0 %    Eosinophil % 3.4 0.3 - 6.2 %    Basophil % 0.3 0.0 - 1.5 %    Immature Grans % 0.5 0.0 - 0.5 %    Neutrophils, Absolute 5.52 1.70 - 7.00 10*3/mm3    Lymphocytes, Absolute 1.35 0.70 - 3.10 10*3/mm3    Monocytes, Absolute 0.69 0.10 - 0.90 10*3/mm3    Eosinophils, Absolute 0.27 0.00 - 0.40 10*3/mm3    Basophils, Absolute 0.02 0.00 - 0.20 10*3/mm3    Immature Grans, Absolute 0.04 0.00 - 0.05 10*3/mm3    nRBC 0.0 0.0 - 0.2 /100 WBC   Uric Acid    Collection Time: 06/08/25  3:42 AM    Specimen: Blood   Result Value Ref Range    Uric Acid 6.5 (H) 2.4 - 5.7 mg/dL   Iron Profile w/o Ferritin    Collection Time: 06/08/25  3:42 AM    Specimen: Blood   Result Value Ref Range    Iron 36 (L) 37 - 145 mcg/dL    Iron Saturation (TSAT) 19 (L) 20 - 50 %    Transferrin 126 (L) 200 - 360 mg/dL    TIBC 188 (L) 298 - 536 mcg/dL   Blood Culture With YOLA - Blood, Arm, Right    Collection Time: 06/08/25 10:43 AM    Specimen: Arm, Right; Blood   Result Value Ref Range    Blood Culture No growth at 5 days    PTH, Intact    Collection Time: 06/08/25  2:52 PM    Specimen: Blood   Result Value Ref Range    PTH, Intact 39.3 15.0 - 65.0 pg/mL   Occult Blood X 1, Stool - Stool, Per Rectum    Collection Time: 06/08/25  5:12 PM    Specimen: Per Rectum; Stool   Result Value Ref Range    Fecal Occult Blood Negative Negative   Sodium, Urine, Random - Urine, Clean Catch    Collection Time: 06/08/25 11:20 PM     Specimen: Urine, Clean Catch   Result Value Ref Range    Sodium, Urine 41 mmol/L   Microalbumin / Creatinine Urine Ratio - Urine, Clean Catch    Collection Time: 06/08/25 11:20 PM    Specimen: Urine, Clean Catch   Result Value Ref Range    Microalbumin/Creatinine Ratio 42.7 (H) 0.0 - 29.0 mg/g    Creatinine, Urine 75.0 mg/dL    Microalbumin, Urine 3.2 mg/dL   Comprehensive Metabolic Panel    Collection Time: 06/09/25  5:01 AM    Specimen: Blood   Result Value Ref Range    Glucose 100 (H) 65 - 99 mg/dL    BUN 47.3 (H) 8.0 - 23.0 mg/dL    Creatinine 2.75 (H) 0.57 - 1.00 mg/dL    Sodium 139 136 - 145 mmol/L    Potassium 4.5 3.5 - 5.2 mmol/L    Chloride 114 (H) 98 - 107 mmol/L    CO2 19.0 (L) 22.0 - 29.0 mmol/L    Calcium 8.5 (L) 8.6 - 10.5 mg/dL    Total Protein 5.6 (L) 6.0 - 8.5 g/dL    Albumin 2.6 (L) 3.5 - 5.2 g/dL    ALT (SGPT) <5 1 - 33 U/L    AST (SGOT) 6 1 - 32 U/L    Alkaline Phosphatase 48 39 - 117 U/L    Total Bilirubin <0.2 0.0 - 1.2 mg/dL    Globulin 3.0 gm/dL    A/G Ratio 0.9 g/dL    BUN/Creatinine Ratio 17.2 7.0 - 25.0    Anion Gap 6.0 5.0 - 15.0 mmol/L    eGFR 16.7 (L) >60.0 mL/min/1.73   CBC Auto Differential    Collection Time: 06/09/25  5:01 AM    Specimen: Blood   Result Value Ref Range    WBC 5.86 3.40 - 10.80 10*3/mm3    RBC 2.47 (L) 3.77 - 5.28 10*6/mm3    Hemoglobin 7.0 (L) 12.0 - 15.9 g/dL    Hematocrit 23.5 (L) 34.0 - 46.6 %    MCV 95.1 79.0 - 97.0 fL    MCH 28.3 26.6 - 33.0 pg    MCHC 29.8 (L) 31.5 - 35.7 g/dL    RDW 13.6 12.3 - 15.4 %    RDW-SD 47.2 37.0 - 54.0 fl    MPV 10.3 6.0 - 12.0 fL    Platelets 165 140 - 450 10*3/mm3    Neutrophil % 61.1 42.7 - 76.0 %    Lymphocyte % 25.6 19.6 - 45.3 %    Monocyte % 8.7 5.0 - 12.0 %    Eosinophil % 4.1 0.3 - 6.2 %    Basophil % 0.2 0.0 - 1.5 %    Immature Grans % 0.3 0.0 - 0.5 %    Neutrophils, Absolute 3.58 1.70 - 7.00 10*3/mm3    Lymphocytes, Absolute 1.50 0.70 - 3.10 10*3/mm3    Monocytes, Absolute 0.51 0.10 - 0.90 10*3/mm3    Eosinophils,  Absolute 0.24 0.00 - 0.40 10*3/mm3    Basophils, Absolute 0.01 0.00 - 0.20 10*3/mm3    Immature Grans, Absolute 0.02 0.00 - 0.05 10*3/mm3    nRBC 0.0 0.0 - 0.2 /100 WBC   Basic Metabolic Panel    Collection Time: 06/10/25  3:09 AM    Specimen: Blood   Result Value Ref Range    Glucose 116 (H) 65 - 99 mg/dL    BUN 41.3 (H) 8.0 - 23.0 mg/dL    Creatinine 2.77 (H) 0.57 - 1.00 mg/dL    Sodium 139 136 - 145 mmol/L    Potassium 4.1 3.5 - 5.2 mmol/L    Chloride 109 (H) 98 - 107 mmol/L    CO2 19.0 (L) 22.0 - 29.0 mmol/L    Calcium 8.7 8.6 - 10.5 mg/dL    BUN/Creatinine Ratio 14.9 7.0 - 25.0    Anion Gap 11.0 5.0 - 15.0 mmol/L    eGFR 16.6 (L) >60.0 mL/min/1.73   CBC Auto Differential    Collection Time: 06/10/25  3:09 AM    Specimen: Blood   Result Value Ref Range    WBC 8.11 3.40 - 10.80 10*3/mm3    RBC 2.67 (L) 3.77 - 5.28 10*6/mm3    Hemoglobin 7.8 (L) 12.0 - 15.9 g/dL    Hematocrit 24.9 (L) 34.0 - 46.6 %    MCV 93.3 79.0 - 97.0 fL    MCH 29.2 26.6 - 33.0 pg    MCHC 31.3 (L) 31.5 - 35.7 g/dL    RDW 13.5 12.3 - 15.4 %    RDW-SD 46.2 37.0 - 54.0 fl    MPV 10.5 6.0 - 12.0 fL    Platelets 176 140 - 450 10*3/mm3    Neutrophil % 76.2 (H) 42.7 - 76.0 %    Lymphocyte % 15.0 (L) 19.6 - 45.3 %    Monocyte % 6.4 5.0 - 12.0 %    Eosinophil % 2.1 0.3 - 6.2 %    Basophil % 0.1 0.0 - 1.5 %    Immature Grans % 0.2 0.0 - 0.5 %    Neutrophils, Absolute 6.17 1.70 - 7.00 10*3/mm3    Lymphocytes, Absolute 1.22 0.70 - 3.10 10*3/mm3    Monocytes, Absolute 0.52 0.10 - 0.90 10*3/mm3    Eosinophils, Absolute 0.17 0.00 - 0.40 10*3/mm3    Basophils, Absolute 0.01 0.00 - 0.20 10*3/mm3    Immature Grans, Absolute 0.02 0.00 - 0.05 10*3/mm3    nRBC 0.0 0.0 - 0.2 /100 WBC   Vitamin D,25-Hydroxy    Collection Time: 06/10/25  3:13 AM    Specimen: Blood   Result Value Ref Range    25 Hydroxy, Vitamin D 38.0 30.0 - 100.0 ng/ml   Basic Metabolic Panel    Collection Time: 06/11/25  5:46 AM    Specimen: Blood   Result Value Ref Range    Glucose 110 (H) 65 - 99  mg/dL    BUN 34.2 (H) 8.0 - 23.0 mg/dL    Creatinine 2.15 (H) 0.57 - 1.00 mg/dL    Sodium 141 136 - 145 mmol/L    Potassium 3.5 3.5 - 5.2 mmol/L    Chloride 108 (H) 98 - 107 mmol/L    CO2 23.0 22.0 - 29.0 mmol/L    Calcium 8.4 (L) 8.6 - 10.5 mg/dL    BUN/Creatinine Ratio 15.9 7.0 - 25.0    Anion Gap 10.0 5.0 - 15.0 mmol/L    eGFR 22.5 (L) >60.0 mL/min/1.73   CBC (No Diff)    Collection Time: 06/11/25  5:46 AM    Specimen: Blood   Result Value Ref Range    WBC 6.47 3.40 - 10.80 10*3/mm3    RBC 2.49 (L) 3.77 - 5.28 10*6/mm3    Hemoglobin 7.1 (L) 12.0 - 15.9 g/dL    Hematocrit 22.4 (L) 34.0 - 46.6 %    MCV 90.0 79.0 - 97.0 fL    MCH 28.5 26.6 - 33.0 pg    MCHC 31.7 31.5 - 35.7 g/dL    RDW 13.2 12.3 - 15.4 %    RDW-SD 43.7 37.0 - 54.0 fl    MPV 10.1 6.0 - 12.0 fL    Platelets 137 (L) 140 - 450 10*3/mm3   Potassium    Collection Time: 06/11/25 12:53 PM    Specimen: Blood   Result Value Ref Range    Potassium 3.6 3.5 - 5.2 mmol/L   Basic Metabolic Panel    Collection Time: 06/12/25  8:24 AM    Specimen: Blood   Result Value Ref Range    Glucose 104 (H) 65 - 99 mg/dL    BUN 25.3 (H) 8.0 - 23.0 mg/dL    Creatinine 1.89 (H) 0.57 - 1.00 mg/dL    Sodium 140 136 - 145 mmol/L    Potassium 3.7 3.5 - 5.2 mmol/L    Chloride 107 98 - 107 mmol/L    CO2 22.0 22.0 - 29.0 mmol/L    Calcium 8.5 (L) 8.6 - 10.5 mg/dL    BUN/Creatinine Ratio 13.4 7.0 - 25.0    Anion Gap 11.0 5.0 - 15.0 mmol/L    eGFR 26.3 (L) >60.0 mL/min/1.73   CBC (No Diff)    Collection Time: 06/12/25  8:24 AM    Specimen: Blood   Result Value Ref Range    WBC 7.00 3.40 - 10.80 10*3/mm3    RBC 2.46 (L) 3.77 - 5.28 10*6/mm3    Hemoglobin 7.2 (L) 12.0 - 15.9 g/dL    Hematocrit 21.2 (L) 34.0 - 46.6 %    MCV 86.2 79.0 - 97.0 fL    MCH 29.3 26.6 - 33.0 pg    MCHC 34.0 31.5 - 35.7 g/dL    RDW 13.1 12.3 - 15.4 %    RDW-SD 40.6 37.0 - 54.0 fl    MPV 11.1 6.0 - 12.0 fL    Platelets 126 (L) 140 - 450 10*3/mm3   Basic Metabolic Panel    Collection Time: 06/13/25  4:28 AM     Specimen: Blood   Result Value Ref Range    Glucose 111 (H) 65 - 99 mg/dL    BUN 27.4 (H) 8.0 - 23.0 mg/dL    Creatinine 1.79 (H) 0.57 - 1.00 mg/dL    Sodium 140 136 - 145 mmol/L    Potassium 3.6 3.5 - 5.2 mmol/L    Chloride 103 98 - 107 mmol/L    CO2 29.0 22.0 - 29.0 mmol/L    Calcium 8.5 (L) 8.6 - 10.5 mg/dL    BUN/Creatinine Ratio 15.3 7.0 - 25.0    Anion Gap 8.0 5.0 - 15.0 mmol/L    eGFR 28.0 (L) >60.0 mL/min/1.73   CBC (No Diff)    Collection Time: 06/13/25  4:28 AM    Specimen: Blood   Result Value Ref Range    WBC 6.67 3.40 - 10.80 10*3/mm3    RBC 2.53 (L) 3.77 - 5.28 10*6/mm3    Hemoglobin 7.2 (L) 12.0 - 15.9 g/dL    Hematocrit 22.6 (L) 34.0 - 46.6 %    MCV 89.3 79.0 - 97.0 fL    MCH 28.5 26.6 - 33.0 pg    MCHC 31.9 31.5 - 35.7 g/dL    RDW 13.2 12.3 - 15.4 %    RDW-SD 43.2 37.0 - 54.0 fl    MPV 10.8 6.0 - 12.0 fL    Platelets 148 140 - 450 10*3/mm3   Potassium    Collection Time: 06/13/25 12:03 PM    Specimen: Blood   Result Value Ref Range    Potassium 3.9 3.5 - 5.2 mmol/L   CBC (No Diff)    Collection Time: 06/14/25  4:28 AM    Specimen: Blood   Result Value Ref Range    WBC 6.34 3.40 - 10.80 10*3/mm3    RBC 2.49 (L) 3.77 - 5.28 10*6/mm3    Hemoglobin 7.1 (L) 12.0 - 15.9 g/dL    Hematocrit 22.6 (L) 34.0 - 46.6 %    MCV 90.8 79.0 - 97.0 fL    MCH 28.5 26.6 - 33.0 pg    MCHC 31.4 (L) 31.5 - 35.7 g/dL    RDW 13.2 12.3 - 15.4 %    RDW-SD 43.3 37.0 - 54.0 fl    MPV 10.8 6.0 - 12.0 fL    Platelets 183 140 - 450 10*3/mm3   Basic Metabolic Panel    Collection Time: 06/14/25  4:28 AM    Specimen: Blood   Result Value Ref Range    Glucose 108 (H) 65 - 99 mg/dL    BUN 28.2 (H) 8.0 - 23.0 mg/dL    Creatinine 1.81 (H) 0.57 - 1.00 mg/dL    Sodium 140 136 - 145 mmol/L    Potassium 4.5 3.5 - 5.2 mmol/L    Chloride 105 98 - 107 mmol/L    CO2 28.0 22.0 - 29.0 mmol/L    Calcium 8.7 8.6 - 10.5 mg/dL    BUN/Creatinine Ratio 15.6 7.0 - 25.0    Anion Gap 7.0 5.0 - 15.0 mmol/L    eGFR 27.7 (L) >60.0 mL/min/1.73   NM Renal With  Flow & Function With Pharmacological Intervention (06/12/2025 13:41)   Assessment and PlanUS Renal Bilateral (06/11/2025 15:19) CT Abdomen Pelvis Without Contrast (06/10/2025 14:19)     Diagnoses and all orders for this visit:    1. Bilateral hydronephrosis (Primary)      Indwelling Smith catheter to be replaced today as ultimately catheter was put back in during recent hospitalization.  Despite Smith catheter placement for decompression patient had repeat imaging that revealed ongoing right sided hydronephrosis for which patient later underwent nuclear medicine renal function scan where patient was noted to have nonobstructive right sided hydronephrosis therefore no need for stent placement.  It is recommended patient keep indwelling Smith catheter and undergo monthly catheter changes.    Will have patient return in 1 month for nurse visit catheter change.  We will go ahead today and remove and replace catheter

## 2025-07-10 NOTE — TELEPHONE ENCOUNTER
Caller: [facility] - [place], [place] - [address] - [phone] [facility] [phone] FX    Relationship: Pharmacy    Best call back number:  [phone]     What is the best time to reach you: NEED TODAY    Who are you requesting to speak with (clinical staff, provider,  specific staff member): CLINICAL       What was the call regarding:  NEEDS A MEDICATION LIST FOR THEY ARE DOING HER MEDICATION PACKAGING.  NEEDS TODAY.

## 2025-07-11 ENCOUNTER — TELEPHONE (OUTPATIENT)
Dept: FAMILY MEDICINE CLINIC | Facility: CLINIC | Age: 85
End: 2025-07-11
Payer: MEDICARE

## 2025-07-11 NOTE — TELEPHONE ENCOUNTER
Called and spoke with reyna De Guzman, now AUGUSTIN.  They are working with an , and has been recommended to get a letter that states she does not need in-home care 24/7.  Their short-term plan is for [facility] to provide assistance from -3 daily, along with 24/7 on-call services.  Long-term, planning to get her into an assisted living plan in [place] (much closer to them in [place], [place]).  They have an appointment on Saturday for a meeting haydee.  Discussed I am hesitant to provide this with her history of recent hospitalization, failure to thrive.  Would need an in person visit prior to agreeing to paperwork.      Please schedule Ms. Gmoes an appointment for Tuesday at 930.

## 2025-07-11 NOTE — TELEPHONE ENCOUNTER
Caller: LORI AVITIA    Relationship: Other    Best call back number:     [phone]       What form or medical record are you requesting: LETTER STATING THAT PATIENT DOES NOT REQUIRE NURSING HOME CARE OR 24/7 NURSING CARE    Who is requesting this form or medical record from you: FOR     How would you like to receive the form or medical records (pick-up, mail, fax): PICKUP      Timeframe paperwork needed: AS SOON AS POSSIBLE    Additional notes: LORI AVITIA IS PTS NEW POA. HE WILL BE BRINGING IN THE PAPERWORK FOR THIS WHEN HE PICKS UP THIS LETTER FROM THE OFFICE. PLEASE CALL WHEN READY FOR PICKUP. ALSO CALL IF YOU HAVE ANY ADDITIONAL QUESTIONS ABOUT THE LETTER.

## 2025-07-11 NOTE — PROGRESS NOTES
Subjective   The ABCs of the Annual Wellness Visit  Medicare Wellness Visit      Jennifer Gomes is a 84 y.o. patient who presents for a Medicare Wellness Visit.    The following portions of the patient's history were reviewed and   updated as appropriate: allergies, current medications, past family history, past medical history, past social history, past surgical history, and problem list.    Compared to one year ago, the patient's physical   health is better.  Compared to one year ago, the patient's mental   health is better.    Recent Hospitalizations:  This patient has had a [facility] admission record on file within the last 365 days.  Current Medical Providers:  Patient Care Team:  Katy Leone DO as PCP - General (Family Medicine)  [facility]  Eleazar Ramires MD as Cardiologist (Cardiology)  Shasta Madrigal MD as Referring Physician (Obstetrics and Gynecology)  Holly Chavez MD as Consulting Physician (General Surgery)    Outpatient Medications Prior to Visit   Medication Sig Dispense Refill    acetaminophen (TYLENOL) 325 MG tablet Take 2 tablets by mouth Every 6 (Six) Hours As Needed for Mild Pain.      amLODIPine (NORVASC) 5 MG tablet TAKE 1 TABLET BY MOUTH ONCE DAILY 30 tablet 5    atorvastatin (LIPITOR) 40 MG tablet TAKE 1 TABLET BY MOUTH ONCE DAILY 90 tablet 0    carvedilol (COREG) 3.125 MG tablet Take 1 tablet by mouth 2 (Two) Times a Day With Meals.      cyclobenzaprine (FLEXERIL) 10 MG tablet Take 1 tablet by mouth Every 12 (Twelve) Hours.      donepezil (ARICEPT) 10 MG tablet TAKE 1 TABLET BY MOUTH EVERY NIGHT AT BEDTIME 30 tablet 2    ergocalciferol (ERGOCALCIFEROL) 30415 units capsule Take 1 capsule by mouth 1 (One) Time Per Week. Saturday      gabapentin (NEURONTIN) 300 MG capsule Take 1 capsule by mouth 2 (Two) Times a Day.      lansoprazole (PREVACID) 30 MG capsule Take 1 capsule by mouth Daily.      levothyroxine (SYNTHROID, LEVOTHROID) 50 MCG tablet TAKE 1 TABLET BY  MOUTH ONCE DAILY 30 tablet 2    oxyCODONE (ROXICODONE) 15 MG immediate release tablet Take 1 tablet by mouth 3 times a day.      sennosides-docusate (PERICOLACE) 8.6-50 MG per tablet Take 1 tablet by mouth 2 (Two) Times a Day. 60 tablet 0    sertraline (ZOLOFT) 25 MG tablet Take 1 tablet by mouth Daily. 90 tablet 0     No facility-administered medications prior to visit.     Opioid medication/s are on active medication list.  and I have evaluated her active treatment plan and pain score trends (see table).  There were no vitals filed for this visit.  I have reviewed the chart for potential of high risk medication and harmful drug interactions in the elderly.        Aspirin is not on active medication list.  Aspirin use is not indicated based on review of current medical condition/s. Risk of harm outweighs potential benefits.  .    Patient Active Problem List   Diagnosis    Gastroesophageal reflux disease    Spinal stenosis, lumbar region, without neurogenic claudication    Erosion of vaginal mesh    Adenocarcinoma of endometrium    S/P hysterectomy with oophorectomy    Encounter for follow-up surveillance of endometrial cancer    History of radiation therapy    Non-traumatic rhabdomyolysis    Metabolic encephalopathy    Acute renal failure superimposed on stage 3 chronic kidney disease    Medical non-compliance, does not take narcotics as prescribed    Constipation    Chronic pain syndrome    Chronic prescription opiate use    Anemia    Hypothyroidism (acquired)    Essential hypertension    Venous insufficiency of both lower extremities    Chronic intractable headache    RAFAL (obstructive sleep apnea)    Acute encephalopathy due to UTI    Toxic metabolic encephalopathy    Polypharmacy    Frequent falls    Grade III internal hemorrhoids    External hemorrhoids    Colitis    Moderate malnutrition    Transaminitis    Acute UTI (urinary tract infection)    Recurrent UTI (urinary tract infection)    Dementia     "Hypokalemia    Sepsis due to urinary tract infection    Cardiopulmonary arrest    E coli bacteremia    Anemia requiring transfusions    Hydronephrosis    Acute urinary retention    TOMÁS (acute kidney injury)    Hypercalcemia    Paroxysmal atrial fibrillation    Altered mental status    Generalized weakness     Advance Care Planning Advance Directive is not on file.  ACP discussion was held with the patient during this visit. Patient does not have an advance directive, information provided.            Objective   There were no vitals filed for this visit.    Estimated body mass index is 24.51 kg/m² as calculated from the following:    Height as of 6/7/25: 157.5 cm (62\").    Weight as of 6/7/25: 60.8 kg (134 lb).    BMI is within normal parameters. No other follow-up for BMI required.           Does the patient have evidence of cognitive impairment? No                                                                                                  Health  Risk Assessment    Smoking Status:  Social History     Tobacco Use   Smoking Status Never   Smokeless Tobacco Never     Alcohol Consumption:  Social History     Substance and Sexual Activity   Alcohol Use Not Currently    Comment: occasional       Fall Risk Screen  STEADI Fall Risk Assessment was completed, and patient is at MODERATE risk for falls. Assessment completed on:4/9/2025    Depression Screening   The PHQ has not been completed during this encounter.     Health Habits and Functional and Cognitive Screening:       No data to display                    Age-appropriate Screening Schedule:  Refer to the list below for future screening recommendations based on patient's age, sex and/or medical conditions. Orders for these recommended tests are listed in the plan section. The patient has been provided with a written plan.    Health Maintenance List  Health Maintenance   Topic Date Due    TDAP/TD VACCINES (1 - Tdap) Never done    RSV Vaccine - Adults (1 - 1-dose " 75+ series) Never done    ZOSTER VACCINE (3 of 3) 12/05/2019    COVID-19 Vaccine (5 - 2024-25 season) 09/01/2024    DXA SCAN  01/23/2025    ANNUAL WELLNESS VISIT  05/09/2025    INFLUENZA VACCINE  10/01/2025    LIPID PANEL  04/09/2026    COLORECTAL CANCER SCREENING  10/01/2031    Pneumococcal Vaccine 50+  Completed    MAMMOGRAM  Discontinued                                                                                                                                                CMS Preventative Services Quick Reference  Risk Factors Identified During Encounter  Chronic Pain: Following with pain mgt  Depression/Dysphoria: Stable.   Fall Risk-High or Moderate: Sit to Stand Exercise Information Shared in After Visit Summary  Polypharmacy: Medication List reviewed    The above risks/problems have been discussed with the patient.  Pertinent information has been shared with the patient in the After Visit Summary.  An After Visit Summary and PPPS were made available to the patient.    Follow Up:   Next Medicare Wellness visit to be scheduled in 1 year.         Additional E&M Note during same encounter follows:  Patient has additional, significant, and separately identifiable condition(s)/problem(s) that require work above and beyond the Medicare Wellness Visit     Chief Complaint  No chief complaint on file.    Subjective   HPI  Jennifer is also being seen today for additional medical problem/s.        Patient here with Yasemin with gwendolyn Diaz, reyna and fany RUFFIN on the phone. Here to discuss plans with recent passing of her daughter and caregiver. They are working with an , and has been recommended to get a letter that states she does not need in-home care 24/7.  Their short-term plan is for Gwendolyn Diaz to provide assistance from 9-3 daily, along with 24/7 on-call services.  Long-term, planning to get her into an assisted living plan in [place] (much closer to them in [place], [place]).     She recently  "followed up with urology for her bilateral hydronephrosis and indwelling Smith catheter.  It was recommended at that time to keep the indwelling Smith and undergo monthly catheter changes.      Objective   Vital Signs:  /61 (BP Location: Right arm, Patient Position: Sitting, Cuff Size: Adult)   Pulse 79   Ht 157.5 cm (62\")   Wt 65.6 kg (144 lb 9.6 oz)   SpO2 99%   BMI 26.45 kg/m²   Physical Exam               Assessment and Plan      Encounter for Medicare annual wellness exam         Dementia, unspecified dementia severity, unspecified dementia type, unspecified whether behavioral, psychotic, or mood disturbance or anxiety    Stable, in a good place currently. No worsening with recent death of her daughter.        Major depressive disorder, recurrent episode, moderate degree    Stable, in a good place currently. No worsening with recent death of her daughter.        Chronic prescription opiate use  Doing well on current regimen.        Essential hypertension  Stable. No dizziness.          Hypothyroidism (acquired)         Chronic kidney disease, unspecified CKD stage      Orders:    Ambulatory Referral to Nephrology    Anemia due to chronic kidney disease, unspecified CKD stage    Orders:    Ambulatory Referral to Nephrology            Follow Up   Return in about 3 months (around 10/15/2025).  Patient was given instructions and counseling regarding her condition or for health maintenance advice. Please see specific information pulled into the AVS if appropriate.    "

## 2025-07-11 NOTE — TELEPHONE ENCOUNTER
Sent a message to Chris to schedule patient on Tuesday July 15th at 9:30. Unable to unblock slot that Dr. Leone requested.

## 2025-07-14 ENCOUNTER — OFFICE VISIT (OUTPATIENT)
Dept: UROLOGY | Facility: CLINIC | Age: 85
End: 2025-07-14
Payer: MEDICARE

## 2025-07-14 DIAGNOSIS — N13.30 BILATERAL HYDRONEPHROSIS: Primary | ICD-10-CM

## 2025-07-14 PROCEDURE — 51702 INSERT TEMP BLADDER CATH: CPT

## 2025-07-14 PROCEDURE — 1160F RVW MEDS BY RX/DR IN RCRD: CPT

## 2025-07-14 PROCEDURE — 99214 OFFICE O/P EST MOD 30 MIN: CPT

## 2025-07-14 PROCEDURE — 1159F MED LIST DOCD IN RCRD: CPT

## 2025-07-14 NOTE — PROGRESS NOTES
Jennifer Gomes is here today for catheter change.  Patient is seen by MORGAN Noland.  The old catheter was removed without difficulty. Using sterile technique the patient was prepped with Hibiclens and new 16F catheter was placed. 10 cc of sterile water used to inflated the balloon and catheter was then attached to a leg bag. Patient tolerated the procedure well.  MORGAN Noland was in the office at the time of procedure. The patient was advised to return in 1 month for next catheter change. Patient verbalized understanding.     I have reviewed and agree with medical assistance documentation above

## 2025-07-15 ENCOUNTER — READMISSION MANAGEMENT (OUTPATIENT)
Dept: CALL CENTER | Facility: HOSPITAL | Age: 85
End: 2025-07-15
Payer: MEDICARE

## 2025-07-15 ENCOUNTER — OFFICE VISIT (OUTPATIENT)
Dept: FAMILY MEDICINE CLINIC | Facility: CLINIC | Age: 85
End: 2025-07-15
Payer: MEDICARE

## 2025-07-15 VITALS
HEIGHT: 62 IN | OXYGEN SATURATION: 99 % | DIASTOLIC BLOOD PRESSURE: 61 MMHG | WEIGHT: 144.6 LBS | BODY MASS INDEX: 26.61 KG/M2 | HEART RATE: 79 BPM | SYSTOLIC BLOOD PRESSURE: 100 MMHG

## 2025-07-15 DIAGNOSIS — E03.9 HYPOTHYROIDISM (ACQUIRED): ICD-10-CM

## 2025-07-15 DIAGNOSIS — Z79.891 CHRONIC PRESCRIPTION OPIATE USE: Chronic | ICD-10-CM

## 2025-07-15 DIAGNOSIS — N18.9 CHRONIC KIDNEY DISEASE, UNSPECIFIED CKD STAGE: ICD-10-CM

## 2025-07-15 DIAGNOSIS — I10 ESSENTIAL HYPERTENSION: ICD-10-CM

## 2025-07-15 DIAGNOSIS — Z00.00 ENCOUNTER FOR MEDICARE ANNUAL WELLNESS EXAM: Primary | ICD-10-CM

## 2025-07-15 DIAGNOSIS — N18.9 ANEMIA DUE TO CHRONIC KIDNEY DISEASE, UNSPECIFIED CKD STAGE: ICD-10-CM

## 2025-07-15 DIAGNOSIS — D63.1 ANEMIA DUE TO CHRONIC KIDNEY DISEASE, UNSPECIFIED CKD STAGE: ICD-10-CM

## 2025-07-15 DIAGNOSIS — F33.1 MAJOR DEPRESSIVE DISORDER, RECURRENT EPISODE, MODERATE DEGREE: ICD-10-CM

## 2025-07-15 DIAGNOSIS — F03.90 DEMENTIA, UNSPECIFIED DEMENTIA SEVERITY, UNSPECIFIED DEMENTIA TYPE, UNSPECIFIED WHETHER BEHAVIORAL, PSYCHOTIC, OR MOOD DISTURBANCE OR ANXIETY: ICD-10-CM

## 2025-07-15 PROCEDURE — 1159F MED LIST DOCD IN RCRD: CPT

## 2025-07-15 PROCEDURE — 99214 OFFICE O/P EST MOD 30 MIN: CPT

## 2025-07-15 PROCEDURE — 1125F AMNT PAIN NOTED PAIN PRSNT: CPT

## 2025-07-15 PROCEDURE — 3074F SYST BP LT 130 MM HG: CPT

## 2025-07-15 PROCEDURE — G0439 PPPS, SUBSEQ VISIT: HCPCS

## 2025-07-15 PROCEDURE — 3078F DIAST BP <80 MM HG: CPT

## 2025-07-15 PROCEDURE — 1160F RVW MEDS BY RX/DR IN RCRD: CPT

## 2025-07-15 PROCEDURE — 1170F FXNL STATUS ASSESSED: CPT

## 2025-07-15 NOTE — PATIENT INSTRUCTIONS
-Needs PCP, Urologist, Nephrologist  -Catheter Care  -How often staff is in their room, medication management  -Food services

## 2025-07-15 NOTE — ASSESSMENT & PLAN NOTE
{Psychiatric illness (Optional):51358}  Stable, in a good place currently. No worsening with recent death of her daughter.

## 2025-07-15 NOTE — OUTREACH NOTE
Sepsis Week 3 Survey      Flowsheet Row Responses   [facility] facility patient discharged from? [facility]   Does the patient have one of the following disease processes/diagnoses(primary or secondary)? Sepsis   Week 3 attempt successful? No   Unsuccessful attempts Attempt 2   Revoke Other            Kathy H - Registered Nurse

## 2025-07-15 NOTE — LETTER
July 15, 2025     Patient: Jennifer Gomes   YOB: 1940   Date of Visit: 7/15/2025       To Whom It May Concern:    Ms. Jennifer Gomes is a current patient of [facility]. Seen 7/15/25 for follow up evaluation. It is my medical opinion that Ms. Gomes does not need 24/7 care, but does need close oversight for all matters including finance, hygiene, meals, and medications.     If you need any further information, please reach out to our office.          Sincerely,        Katy Leone, DO

## 2025-07-16 ENCOUNTER — TELEPHONE (OUTPATIENT)
Dept: FAMILY MEDICINE CLINIC | Facility: CLINIC | Age: 85
End: 2025-07-16
Payer: MEDICARE

## 2025-07-16 DIAGNOSIS — F03.90 DEMENTIA, UNSPECIFIED DEMENTIA SEVERITY, UNSPECIFIED DEMENTIA TYPE, UNSPECIFIED WHETHER BEHAVIORAL, PSYCHOTIC, OR MOOD DISTURBANCE OR ANXIETY: Primary | ICD-10-CM

## 2025-07-16 DIAGNOSIS — R53.1 GENERALIZED WEAKNESS: ICD-10-CM

## 2025-07-16 DIAGNOSIS — F33.1 MAJOR DEPRESSIVE DISORDER, RECURRENT EPISODE, MODERATE DEGREE: ICD-10-CM

## 2025-07-16 DIAGNOSIS — Z79.899 POLYPHARMACY: ICD-10-CM

## 2025-07-16 NOTE — TELEPHONE ENCOUNTER
Vanessa Martinez from [facility] concerning Jennifer Gomes.  They need a new order for her and they are trying to move patient and her  to [facility].  Please call Vanessa back at [phone]

## 2025-07-17 ENCOUNTER — REFERRAL TRIAGE (OUTPATIENT)
Age: 85
End: 2025-07-17
Payer: MEDICARE

## 2025-07-17 ENCOUNTER — TELEPHONE (OUTPATIENT)
Dept: UROLOGY | Facility: CLINIC | Age: 85
End: 2025-07-17
Payer: MEDICARE

## 2025-07-17 NOTE — TELEPHONE ENCOUNTER
I spoke to Gab and asked him to have patient give us a call. If she gives us a verbal over the phone to speak with him, we can give him the appointment information. He said he will call his grandmother and have her do this.    If they call back, please give call to Kary.

## 2025-07-17 NOTE — TELEPHONE ENCOUNTER
Spoke with patients grandmaryellen whom brought her to her appointment. I let him know I spoke with [employer] who seemed to think patients catheter just needs to be flushed. Grandson will find out which home health company the patient uses so we can get an order over to them.     Or we can get the patient and grandson in the office to show them how to do it themselves.

## 2025-07-17 NOTE — TELEPHONE ENCOUNTER
Provider: RYANN AMES    Caller: LORI AVITIA    Relationship to Patient: DAVID    Phone Number: [phone]    Reason for Call: PT'S GRANDSON CALLING, HE STATES HE IS NOW POA, NO PAPERWORK IN CHART. (HE IS LOCATED OUT OF STATE CURRENTLY).    CLINICAL STATED THEY WERE UNABLE TO SPEAK WITH HIM AS HE IS NOT ON HER BH VERBAL.    HE STATES HIS GRANDMOTHER HAS A CATHETER AND IT LOOKS LIKE SHE HAS PUS IN THE BAG. HE WAS CALLING TO SEE IF SHE CAN GET AN APPT TOMORROW. HE CAN GET HER CAREGIVER TO BRING HER TOMORROW BETWEEN 9- 3.    HE WILL SEE IF HE CAN FAX POA PAPERWORK TO THE OFFICE ([phone]).    PLEASE GIVE HIM A CALL BACK.

## 2025-07-17 NOTE — TELEPHONE ENCOUNTER
I spoke to patient and she said we can speak to Gab about her information. Jeanine is in touch with him about this situation.

## 2025-07-18 DIAGNOSIS — Z11.1 TUBERCULOSIS SCREENING: Primary | ICD-10-CM

## 2025-07-22 ENCOUNTER — PATIENT OUTREACH (OUTPATIENT)
Age: 85
End: 2025-07-22
Payer: MEDICARE

## 2025-07-22 NOTE — OUTREACH NOTE
Patient Outreach    SW received referral via PCP re: facility placement. SW called and spoke to patient POA as documented in chart. Per POA, patient was admitted to a facility in [place], [place] yesterday. POA reports they may need additional medical records, explained to POA that most medical records can be retrieved via [employer]. Sw provided POA with [employer] Help Desk number in order to get logged back into account. No additional needs noted. PCP notified of placement. Please re consult SW if additional needs arise.       Lin WOMACK -   Ambulatory Case Management    7/22/2025, 14:48 EDT

## 2025-08-12 ENCOUNTER — TELEPHONE (OUTPATIENT)
Dept: UROLOGY | Facility: CLINIC | Age: 85
End: 2025-08-12
Payer: MEDICARE

## 2025-08-20 ENCOUNTER — TELEPHONE (OUTPATIENT)
Dept: FAMILY MEDICINE CLINIC | Facility: CLINIC | Age: 85
End: 2025-08-20
Payer: MEDICARE

## (undated) DEVICE — 4-PORT MANIFOLD: Brand: NEPTUNE 2

## (undated) DEVICE — SYR LUERLOK 20CC BX/50

## (undated) DEVICE — ELECTRD NDL EZ CLN MOD 2.75IN

## (undated) DEVICE — GAUZE,SPONGE,FLUFF,6"X6.75",STRL,10/TRAY: Brand: MEDLINE

## (undated) DEVICE — YANKAUER,BULB TIP WITH VENT: Brand: ARGYLE

## (undated) DEVICE — RETR STAY HK ELAS SHRP 5MM 50PK

## (undated) DEVICE — SYR CONTRL LUERLOK 10CC

## (undated) DEVICE — FRCP BX RADJAW4 NDL 2.8 240 STD OG

## (undated) DEVICE — BAPTIST TURNOVER KIT: Brand: MEDLINE INDUSTRIES, INC.

## (undated) DEVICE — SUT GUT CHRM 2/0 SH 27IN G123H

## (undated) DEVICE — PAD MAJOR LITHOTOMY: Brand: MEDLINE INDUSTRIES, INC.

## (undated) DEVICE — CATHETER,FOLEY,SILI-ELAST,LTX,20FR,10ML: Brand: MEDLINE

## (undated) DEVICE — ST CATH CHOLANG WKARLAN/BALN 2LUM 4F 1.25

## (undated) DEVICE — PAD MINOR UNIVERSAL: Brand: MEDLINE INDUSTRIES, INC.

## (undated) DEVICE — THE CHANNEL CLEANING BRUSH IS A NYLON FLEXI BRUSH ATTACHED TO A FLEXIBLE PLASTIC SHEATH DESIGNED TO SAFELY REMOVE DEBRIS FROM FLEXIBLE ENDOSCOPES.

## (undated) DEVICE — CUFF,BP,DISP,1 TUBE,ADULT,HP: Brand: MEDLINE

## (undated) DEVICE — VAGINAL PREP TRAY: Brand: MEDLINE INDUSTRIES, INC.

## (undated) DEVICE — PAD D&C: Brand: MEDLINE INDUSTRIES, INC.

## (undated) DEVICE — PAD,ABDOMINAL,8"X10",ST,LF: Brand: MEDLINE

## (undated) DEVICE — Device: Brand: DEFENDO AIR/WATER/SUCTION AND BIOPSY VALVE

## (undated) DEVICE — PANTY KNIT WASHABLE LG/XL BRN/GRN LF

## (undated) DEVICE — MONOPOLAR METZENBAUM SCISSOR, MINI BLADE TIP, DISPOSABLE: Brand: MONOPOLAR METZENBAUM SCISSOR, MINI BLADE TIP, DISPOSABLE

## (undated) DEVICE — ELECTRD BLD EDGE/INSUL1P 2.4X5.1MM STRL

## (undated) DEVICE — ENDOPOUCH RETRIEVER SPECIMEN RETRIEVAL BAGS: Brand: ENDOPOUCH RETRIEVER

## (undated) DEVICE — SPNG GZ PKNG XRAY/DETECT 4PLY 2X36IN STRL

## (undated) DEVICE — TRAP FLD MINIVAC MEGADYNE 100ML

## (undated) DEVICE — STRIP CLS WND CURAD MEDI/STRIP HYPOALLERG 0.25X4IN PK/10

## (undated) DEVICE — PDS II VLT 0 107CM AG ST3: Brand: ENDOLOOP

## (undated) DEVICE — SUT VIC 4/0 P3 18IN UD VCP494H

## (undated) DEVICE — 2, DISPOSABLE SUCTION/IRRIGATOR WITHOUT DISPOSABLE TIP: Brand: STRYKEFLOW

## (undated) DEVICE — SYR LUERLOK 50ML

## (undated) DEVICE — SENSR O2 OXIMAX FNGR A/ 18IN NONSTR

## (undated) DEVICE — GLV SURG TRIUMPH MICRO PF LTX 7.5 STRL

## (undated) DEVICE — 3M™ IOBAN™ 2 ANTIMICROBIAL INCISE DRAPE 6650EZ: Brand: IOBAN™ 2

## (undated) DEVICE — TBG SMPL FLTR LINE NASL 02/C02 A/ BX/100

## (undated) DEVICE — PK TURNOVER RM ADV

## (undated) DEVICE — CAP CONN RED

## (undated) DEVICE — CONMED SCOPE SAVER BITE BLOCK, 20X27 MM: Brand: SCOPE SAVER

## (undated) DEVICE — WIPE THERAWASH SLV SPEC CARE 2PK

## (undated) DEVICE — SUT VIC 2/0 SH 27IN

## (undated) DEVICE — GLV SURG SENSICARE W/ALOE PF LF SZ6 STRL

## (undated) DEVICE — SUT VIC 3/0 RB1 27IN UD VCP215H

## (undated) DEVICE — HDRST INTUB GENTLETOUCH SLOT 7IN RT

## (undated) DEVICE — NDL HYPO PRECISIONGLIDE REG 22G 1 1/2

## (undated) DEVICE — DRAINBAG,ANTI-REFLUX TOWER,L/F,2000ML,LL: Brand: MEDLINE

## (undated) DEVICE — GLV SURG NEOLON 2G PF LF 6 STRL

## (undated) DEVICE — TOWEL,OR,DSP,ST,BLUE,STD,4/PK,20PK/CS: Brand: MEDLINE

## (undated) DEVICE — GLV SURG SENSICARE W/ALOE PF LF 6.5 STRL

## (undated) DEVICE — ENDOGATOR AUXILIARY WATER JET CONNECTOR: Brand: ENDOGATOR

## (undated) DEVICE — CYSTO/BLADDER IRRIGATION SET, REGULATING CLAMP

## (undated) DEVICE — TBG DRN URINARY 9/32IN

## (undated) DEVICE — MASK,OXYGEN,MED CONC,ADLT,7' TUB, UC: Brand: PENDING

## (undated) DEVICE — ST TB EXT STANDARDBORE 30IN

## (undated) DEVICE — PAD LAP CHOLE: Brand: MEDLINE INDUSTRIES, INC.

## (undated) DEVICE — SUT VIC 0 UR6 27IN VCP603H